# Patient Record
Sex: MALE | Race: BLACK OR AFRICAN AMERICAN | NOT HISPANIC OR LATINO | URBAN - METROPOLITAN AREA
[De-identification: names, ages, dates, MRNs, and addresses within clinical notes are randomized per-mention and may not be internally consistent; named-entity substitution may affect disease eponyms.]

---

## 2018-06-20 ENCOUNTER — INPATIENT (INPATIENT)
Facility: HOSPITAL | Age: 68
LOS: 4 days | Discharge: NURSING, REHAB OR LONG-TERM CARE | End: 2018-06-25
Attending: INTERNAL MEDICINE | Admitting: INTERNAL MEDICINE

## 2018-06-20 VITALS
RESPIRATION RATE: 20 BRPM | SYSTOLIC BLOOD PRESSURE: 149 MMHG | OXYGEN SATURATION: 98 % | HEART RATE: 80 BPM | DIASTOLIC BLOOD PRESSURE: 89 MMHG | TEMPERATURE: 99 F

## 2018-06-20 LAB
ALBUMIN SERPL ELPH-MCNC: 3.9 G/DL — SIGNIFICANT CHANGE UP (ref 3.5–5.2)
ALP SERPL-CCNC: 71 U/L — SIGNIFICANT CHANGE UP (ref 30–115)
ALT FLD-CCNC: 55 U/L — HIGH (ref 0–41)
ANION GAP SERPL CALC-SCNC: 15 MMOL/L — HIGH (ref 7–14)
APAP SERPL-MCNC: <5 UG/ML — LOW (ref 10–30)
APPEARANCE UR: CLEAR — SIGNIFICANT CHANGE UP
AST SERPL-CCNC: 40 U/L — SIGNIFICANT CHANGE UP (ref 0–41)
B PERT IGG+IGM PNL SER: (no result)
BASE EXCESS BLDV CALC-SCNC: 0.9 MMOL/L — SIGNIFICANT CHANGE UP (ref -2–2)
BASOPHILS # BLD AUTO: 0.02 K/UL — SIGNIFICANT CHANGE UP (ref 0–0.2)
BASOPHILS NFR BLD AUTO: 0.1 % — SIGNIFICANT CHANGE UP (ref 0–1)
BILIRUB SERPL-MCNC: 2.4 MG/DL — HIGH (ref 0.2–1.2)
BILIRUB UR-MCNC: NEGATIVE — SIGNIFICANT CHANGE UP
BUN SERPL-MCNC: 11 MG/DL — SIGNIFICANT CHANGE UP (ref 10–20)
CALCIUM SERPL-MCNC: 8.6 MG/DL — SIGNIFICANT CHANGE UP (ref 8.5–10.1)
CHLORIDE SERPL-SCNC: 103 MMOL/L — SIGNIFICANT CHANGE UP (ref 98–110)
CK MB CFR SERPL CALC: 3.4 NG/ML — SIGNIFICANT CHANGE UP (ref 0.6–6.3)
CK SERPL-CCNC: 581 U/L — HIGH (ref 0–225)
CO2 SERPL-SCNC: 26 MMOL/L — SIGNIFICANT CHANGE UP (ref 17–32)
COLOR FLD: YELLOW — SIGNIFICANT CHANGE UP
COLOR SPEC: SIGNIFICANT CHANGE UP
CREAT SERPL-MCNC: 0.9 MG/DL — SIGNIFICANT CHANGE UP (ref 0.7–1.5)
DIFF PNL FLD: NEGATIVE — SIGNIFICANT CHANGE UP
EOSINOPHIL # BLD AUTO: 0.08 K/UL — SIGNIFICANT CHANGE UP (ref 0–0.7)
EOSINOPHIL NFR BLD AUTO: 0.6 % — SIGNIFICANT CHANGE UP (ref 0–8)
ERYTHROCYTE [SEDIMENTATION RATE] IN BLOOD: 4 MM/HR — SIGNIFICANT CHANGE UP (ref 0–10)
ETHANOL SERPL-MCNC: <10 MG/DL — HIGH
FLUID INTAKE SUBSTANCE CLASS: SIGNIFICANT CHANGE UP
FLUID SEGMENTED GRANULOCYTES: 60 % — SIGNIFICANT CHANGE UP
GLUCOSE SERPL-MCNC: 88 MG/DL — SIGNIFICANT CHANGE UP (ref 70–99)
GLUCOSE UR QL: NEGATIVE MG/DL — SIGNIFICANT CHANGE UP
HCO3 BLDV-SCNC: 27 MMOL/L — SIGNIFICANT CHANGE UP (ref 22–29)
HCT VFR BLD CALC: 44.9 % — SIGNIFICANT CHANGE UP (ref 42–52)
HGB BLD-MCNC: 15.3 G/DL — SIGNIFICANT CHANGE UP (ref 14–18)
IMM GRANULOCYTES NFR BLD AUTO: 0.4 % — HIGH (ref 0.1–0.3)
KETONES UR-MCNC: (no result)
LACTATE BLDV-MCNC: 3.8 MMOL/L — HIGH (ref 0.5–1.6)
LEUKOCYTE ESTERASE UR-ACNC: (no result)
LIDOCAIN IGE QN: 13 U/L — SIGNIFICANT CHANGE UP (ref 7–60)
LYMPHOCYTES # BLD AUTO: 16.8 % — LOW (ref 20.5–51.1)
LYMPHOCYTES # BLD AUTO: 2.37 K/UL — SIGNIFICANT CHANGE UP (ref 1.2–3.4)
LYMPHOCYTES # FLD: 30 — SIGNIFICANT CHANGE UP
MAGNESIUM SERPL-MCNC: 1.5 MG/DL — LOW (ref 1.8–2.4)
MCHC RBC-ENTMCNC: 32 PG — HIGH (ref 27–31)
MCHC RBC-ENTMCNC: 34.1 G/DL — SIGNIFICANT CHANGE UP (ref 32–37)
MCV RBC AUTO: 93.9 FL — SIGNIFICANT CHANGE UP (ref 80–94)
MONOCYTES # BLD AUTO: 1.21 K/UL — HIGH (ref 0.1–0.6)
MONOCYTES NFR BLD AUTO: 8.6 % — SIGNIFICANT CHANGE UP (ref 1.7–9.3)
MONOS+MACROS # FLD: 10 % — SIGNIFICANT CHANGE UP
NEUTROPHILS # BLD AUTO: 10.38 K/UL — HIGH (ref 1.4–6.5)
NEUTROPHILS NFR BLD AUTO: 73.5 % — SIGNIFICANT CHANGE UP (ref 42.2–75.2)
NITRITE UR-MCNC: NEGATIVE — SIGNIFICANT CHANGE UP
NRBC # BLD: 0 /100 WBCS — SIGNIFICANT CHANGE UP (ref 0–0)
NT-PROBNP SERPL-SCNC: 669 PG/ML — HIGH (ref 0–300)
PCO2 BLDV: 47 MMHG — SIGNIFICANT CHANGE UP (ref 41–51)
PH BLDV: 7.37 — SIGNIFICANT CHANGE UP (ref 7.26–7.43)
PH UR: 6 — SIGNIFICANT CHANGE UP (ref 5–8)
PLATELET # BLD AUTO: 135 K/UL — SIGNIFICANT CHANGE UP (ref 130–400)
PO2 BLDV: 32 MMHG — SIGNIFICANT CHANGE UP (ref 20–40)
POTASSIUM SERPL-MCNC: 4.4 MMOL/L — SIGNIFICANT CHANGE UP (ref 3.5–5)
POTASSIUM SERPL-SCNC: 4.4 MMOL/L — SIGNIFICANT CHANGE UP (ref 3.5–5)
PROT SERPL-MCNC: 6.4 G/DL — SIGNIFICANT CHANGE UP (ref 6–8)
PROT UR-MCNC: 30 MG/DL
RBC # BLD: 4.78 M/UL — SIGNIFICANT CHANGE UP (ref 4.7–6.1)
RBC # FLD: 12.6 % — SIGNIFICANT CHANGE UP (ref 11.5–14.5)
RCV VOL RI: 1000 /UL — HIGH (ref 0–5)
SALICYLATES SERPL-MCNC: <0.3 MG/DL — LOW (ref 4–30)
SAO2 % BLDV: 52 % — SIGNIFICANT CHANGE UP
SODIUM SERPL-SCNC: 144 MMOL/L — SIGNIFICANT CHANGE UP (ref 135–146)
SP GR SPEC: 1.02 — SIGNIFICANT CHANGE UP (ref 1.01–1.03)
TOTAL NUCLEATED CELL COUNT, BODY FLUID: 8056 /UL — HIGH (ref 0–5)
TROPONIN T SERPL-MCNC: <0.01 NG/ML — SIGNIFICANT CHANGE UP
TUBE TYPE: SIGNIFICANT CHANGE UP
UROBILINOGEN FLD QL: 1 MG/DL (ref 0.2–0.2)
WBC # BLD: 14.12 K/UL — HIGH (ref 4.8–10.8)
WBC # FLD AUTO: 14.12 K/UL — HIGH (ref 4.8–10.8)

## 2018-06-20 RX ORDER — SODIUM CHLORIDE 9 MG/ML
1000 INJECTION INTRAMUSCULAR; INTRAVENOUS; SUBCUTANEOUS ONCE
Qty: 0 | Refills: 0 | Status: COMPLETED | OUTPATIENT
Start: 2018-06-20 | End: 2018-06-20

## 2018-06-20 RX ORDER — VANCOMYCIN HCL 1 G
1000 VIAL (EA) INTRAVENOUS ONCE
Qty: 0 | Refills: 0 | Status: COMPLETED | OUTPATIENT
Start: 2018-06-20 | End: 2018-06-20

## 2018-06-20 RX ORDER — CEFTRIAXONE 500 MG/1
2 INJECTION, POWDER, FOR SOLUTION INTRAMUSCULAR; INTRAVENOUS ONCE
Qty: 0 | Refills: 0 | Status: COMPLETED | OUTPATIENT
Start: 2018-06-20 | End: 2018-06-20

## 2018-06-20 RX ORDER — MORPHINE SULFATE 50 MG/1
4 CAPSULE, EXTENDED RELEASE ORAL ONCE
Qty: 0 | Refills: 0 | Status: DISCONTINUED | OUTPATIENT
Start: 2018-06-20 | End: 2018-06-20

## 2018-06-20 RX ADMIN — SODIUM CHLORIDE 1000 MILLILITER(S): 9 INJECTION INTRAMUSCULAR; INTRAVENOUS; SUBCUTANEOUS at 21:42

## 2018-06-20 RX ADMIN — SODIUM CHLORIDE 1000 MILLILITER(S): 9 INJECTION INTRAMUSCULAR; INTRAVENOUS; SUBCUTANEOUS at 20:56

## 2018-06-20 RX ADMIN — CEFTRIAXONE 100 GRAM(S): 500 INJECTION, POWDER, FOR SOLUTION INTRAMUSCULAR; INTRAVENOUS at 21:44

## 2018-06-20 RX ADMIN — MORPHINE SULFATE 4 MILLIGRAM(S): 50 CAPSULE, EXTENDED RELEASE ORAL at 21:41

## 2018-06-20 RX ADMIN — MORPHINE SULFATE 4 MILLIGRAM(S): 50 CAPSULE, EXTENDED RELEASE ORAL at 22:00

## 2018-06-20 RX ADMIN — Medication 250 MILLIGRAM(S): at 22:22

## 2018-06-20 NOTE — ED PROVIDER NOTE - ATTENDING CONTRIBUTION TO CARE
67y m hx CVA, gout, HTN, DM, PD, HLD presents w AMS. Hx obtained from patient and  at bedside. State that patient fell last night approx 4 AM, found this afternoon on floor. ? LOC, ? circumstances of fall. Per , patient seems confused, is answering questions much more slowly than usual. Patient c/o L sided hip pain. + knee pain which began yesterday, prior to fall. Patient denies HA, neck pain, CP, SOB, abdominal pain, n/v. No urinary c/o. + few episodes of diarrhea. No fever, chills at home. Exam: WDWN NAD comfortable appearing, conversing. NCAT neck FROM no ttp and no meningismus. Back no midline ttp. Pelvis stable. Skin warm and dry. HEENT WNL,  dry MM. S1S2 RRR, equal pulses b/l, lungs CTAB, no w/r/r, abdomen soft NTND, no r/g, no CVAT, no suprapubic ttp. Mild L sided hip ttp, FROM of hip, knee, ankle. L side knee + overlying erythema, edema, warmth, ttp w palpable effusion. No calf edema. A&O to self and place, not to time/bday/age, CN ii-xii intact, no dysmetria, no pronator drift, 5/5 strength UE and LE b/l, normal sensation, no aphasia, no dysarthria, no droop. A/P - AMS, ? infection, + fall - labs, imaging, reassess. 67y m hx CVA, gout, HTN, DM, PD, HLD presents w AMS. Hx obtained from patient and  at bedside. State that patient fell last night approx 4 AM, found this afternoon on floor. ? LOC, ? circumstances of fall. Per , patient seems confused, is answering questions much more slowly than usual. Patient c/o L sided hip pain. + knee pain which began yesterday, prior to fall. Patient denies HA, neck pain, CP, SOB, abdominal pain, n/v. No urinary c/o. + few episodes of diarrhea. No fever, chills at home. Exam: WDWN NAD comfortable appearing, conversing. NCAT neck FROM no ttp and no meningismus. Back no midline ttp. Pelvis stable. Skin warm and dry. HEENT WNL,  dry MM. S1S2 RRR, equal pulses b/l, lungs CTAB, no w/r/r, abdomen soft NTND, no r/g, no CVAT, no suprapubic ttp. Mild L sided hip ttp, FROM of hip, ankle. L side knee + overlying erythema, edema, warmth, ttp w palpable effusion. Held in flexion, pain w extension though able to extent. No calf edema. A&O to self and place, not to time/bday/age, CN ii-xii intact, no dysmetria, no pronator drift, 5/5 strength UE and LE b/l, normal sensation, no aphasia, no dysarthria, no droop. A/P - AMS, ? infection, + fall - labs, imaging, reassess.

## 2018-06-20 NOTE — ED ADULT NURSE NOTE - OBJECTIVE STATEMENT
pt's family member states pt has hx of falls, pt was brought to ed s/p fall to left side that was unwitnessed, unknown if pt hit head or had loc, family member states pt has had a change in mental status and is becoming more frequently confused. pt lives alone. pt c/o left sided hip and knee pain

## 2018-06-20 NOTE — ED PROVIDER NOTE - MEDICAL DECISION MAKING DETAILS
Pt was signed out to me by Dr. Puentes at [id number].  All workup prior to this time was initiated by that provider.  Plan to review labs and imaging and reassess.  Pt with AMS, Found on the floor by his partner.

## 2018-06-20 NOTE — ED PROVIDER NOTE - PROGRESS NOTE DETAILS
case dw ortho who will assess patient. will tap knee given concern for septic arthiritis. case endorsed to Dr. Nguyen. case signed out to Dr. Boudreaux, pending CT head, urine, arthrocentesis results, reassess, admit to Dr. Nguyen. Thank you. Spoke with MAR - aware of admission. Will order repeat vbg with medicine to follow. Ortho/Medicine to follow tap results.

## 2018-06-20 NOTE — ED ADULT TRIAGE NOTE - CHIEF COMPLAINT QUOTE
patient over last few days have had altered mental status. hx of stroke in past. found on floor today . increasingly altered

## 2018-06-20 NOTE — ED PROVIDER NOTE - NS ED ROS FT
Review of Systems:  	•	CONSTITUTIONAL - no fever, no diaphoresis, no chills  	•	SKIN - no rash  	•	EYES - no eye pain, no blurry vision  	•	ENT - no change in hearing, no sore throat, no ear pain or tinnitus  	•	RESPIRATORY - no shortness of breath, no cough  	•	CARDIAC - no chest pain, no palpitations  	•	GI - no abd pain, no nausea, no vomiting, no diarrhea, no constipation  	•	GENITO-URINARY - no discharge, no dysuria; no hematuria, no increased urinary frequency  	•	MUSCULOSKELETAL - + joint pain   	•	NEUROLOGIC - no headache, no paresthesias, no LOC

## 2018-06-20 NOTE — ED PROVIDER NOTE - OBJECTIVE STATEMENT
67 year old male with pmhx of CVA, parkinsons, HTN, DM, and gout, brought in by friend for altered mental status. As per pt and friend, pt fell at 4 am this morning, unsure of why. Pt does not recall if he lost consciousness or hit his head. as per friend, did not find him on floor until late this afternoon, and was acting confused and not baseline for himself. Pt complaining of left knee pain and swelling for last few days, as well as new left hip pain s/p fall. Pt denies HA, dizziness, neck or back pain, paresthesias, or weakness, N/V/D, abdominal pain, fever, or chills. Pt admits has had gout episodes in knee in past. no injury to knee.

## 2018-06-20 NOTE — ED PROVIDER NOTE - PHYSICAL EXAMINATION
CONST: Well appearing in NAD  EYES: PERRL, EOMI, Sclera and conjunctiva clear.   ENT: No nasal discharge. TM's clear B/L without drainage. Oropharynx normal appearing, no erythema or exudates. Uvula midline.  NECK: Non-tender, normal ROM  CARD: Normal S1 S2; Normal rate and rhythm  RESP: Equal BS B/L, No wheezes, rhonchi or rales. No distress  GI: Soft, non-tender, non-distended.  MS: + swelling and warmth to left knee, pulses 2 +, Normal ROM in all extremities. No midline spinal tenderness.  SKIN: Warm, dry, no acute rashes. Good turgor  NEURO: A&Ox2,  No focal deficits. Strength 5/5 with no sensory deficits.

## 2018-06-21 LAB
ANION GAP SERPL CALC-SCNC: 15 MMOL/L — HIGH (ref 7–14)
BASOPHILS # BLD AUTO: 0.03 K/UL — SIGNIFICANT CHANGE UP (ref 0–0.2)
BASOPHILS NFR BLD AUTO: 0.2 % — SIGNIFICANT CHANGE UP (ref 0–1)
BUN SERPL-MCNC: 10 MG/DL — SIGNIFICANT CHANGE UP (ref 10–20)
CALCIUM SERPL-MCNC: 7.8 MG/DL — LOW (ref 8.5–10.1)
CHLORIDE SERPL-SCNC: 107 MMOL/L — SIGNIFICANT CHANGE UP (ref 98–110)
CK MB CFR SERPL CALC: 2.2 NG/ML — SIGNIFICANT CHANGE UP (ref 0.6–6.3)
CK SERPL-CCNC: 457 U/L — HIGH (ref 0–225)
CO2 SERPL-SCNC: 21 MMOL/L — SIGNIFICANT CHANGE UP (ref 17–32)
COMMENT - FLUIDS: SIGNIFICANT CHANGE UP
CREAT SERPL-MCNC: 0.8 MG/DL — SIGNIFICANT CHANGE UP (ref 0.7–1.5)
EOSINOPHIL # BLD AUTO: 0.18 K/UL — SIGNIFICANT CHANGE UP (ref 0–0.7)
EOSINOPHIL NFR BLD AUTO: 1.5 % — SIGNIFICANT CHANGE UP (ref 0–8)
GLUCOSE SERPL-MCNC: 76 MG/DL — SIGNIFICANT CHANGE UP (ref 70–99)
GRAM STN FLD: SIGNIFICANT CHANGE UP
GRAM STN FLD: SIGNIFICANT CHANGE UP
HCT VFR BLD CALC: 37.3 % — LOW (ref 42–52)
HGB BLD-MCNC: 12.7 G/DL — LOW (ref 14–18)
IMM GRANULOCYTES NFR BLD AUTO: 0.5 % — HIGH (ref 0.1–0.3)
LYMPHOCYTES # BLD AUTO: 17.4 % — LOW (ref 20.5–51.1)
LYMPHOCYTES # BLD AUTO: 2.1 K/UL — SIGNIFICANT CHANGE UP (ref 1.2–3.4)
MAGNESIUM SERPL-MCNC: 1.7 MG/DL — LOW (ref 1.8–2.4)
MCHC RBC-ENTMCNC: 32.3 PG — HIGH (ref 27–31)
MCHC RBC-ENTMCNC: 34 G/DL — SIGNIFICANT CHANGE UP (ref 32–37)
MCV RBC AUTO: 94.9 FL — HIGH (ref 80–94)
MONOCYTES # BLD AUTO: 0.98 K/UL — HIGH (ref 0.1–0.6)
MONOCYTES NFR BLD AUTO: 8.1 % — SIGNIFICANT CHANGE UP (ref 1.7–9.3)
NEUTROPHILS # BLD AUTO: 8.72 K/UL — HIGH (ref 1.4–6.5)
NEUTROPHILS NFR BLD AUTO: 72.3 % — SIGNIFICANT CHANGE UP (ref 42.2–75.2)
PLATELET # BLD AUTO: 114 K/UL — LOW (ref 130–400)
POTASSIUM SERPL-MCNC: 3.5 MMOL/L — SIGNIFICANT CHANGE UP (ref 3.5–5)
POTASSIUM SERPL-SCNC: 3.5 MMOL/L — SIGNIFICANT CHANGE UP (ref 3.5–5)
RBC # BLD: 3.93 M/UL — LOW (ref 4.7–6.1)
RBC # FLD: 12.7 % — SIGNIFICANT CHANGE UP (ref 11.5–14.5)
SODIUM SERPL-SCNC: 143 MMOL/L — SIGNIFICANT CHANGE UP (ref 135–146)
SPECIMEN SOURCE: SIGNIFICANT CHANGE UP
TROPONIN T SERPL-MCNC: <0.01 NG/ML — SIGNIFICANT CHANGE UP
URATE SERPL-MCNC: 5.9 MG/DL — SIGNIFICANT CHANGE UP (ref 3.4–8.8)
WBC # BLD: 12.07 K/UL — HIGH (ref 4.8–10.8)
WBC # FLD AUTO: 12.07 K/UL — HIGH (ref 4.8–10.8)
WBC UR QL: (no result) /HPF

## 2018-06-21 RX ORDER — MAGNESIUM SULFATE 500 MG/ML
2 VIAL (ML) INJECTION ONCE
Qty: 0 | Refills: 0 | Status: COMPLETED | OUTPATIENT
Start: 2018-06-21 | End: 2018-06-21

## 2018-06-21 RX ORDER — VANCOMYCIN HCL 1 G
1000 VIAL (EA) INTRAVENOUS EVERY 12 HOURS
Qty: 0 | Refills: 0 | Status: DISCONTINUED | OUTPATIENT
Start: 2018-06-21 | End: 2018-06-21

## 2018-06-21 RX ORDER — ENOXAPARIN SODIUM 100 MG/ML
40 INJECTION SUBCUTANEOUS DAILY
Qty: 0 | Refills: 0 | Status: DISCONTINUED | OUTPATIENT
Start: 2018-06-21 | End: 2018-06-25

## 2018-06-21 RX ORDER — OXYCODONE AND ACETAMINOPHEN 5; 325 MG/1; MG/1
1 TABLET ORAL EVERY 4 HOURS
Qty: 0 | Refills: 0 | Status: DISCONTINUED | OUTPATIENT
Start: 2018-06-21 | End: 2018-06-25

## 2018-06-21 RX ORDER — CEFTRIAXONE 500 MG/1
2 INJECTION, POWDER, FOR SOLUTION INTRAMUSCULAR; INTRAVENOUS EVERY 24 HOURS
Qty: 0 | Refills: 0 | Status: DISCONTINUED | OUTPATIENT
Start: 2018-06-21 | End: 2018-06-22

## 2018-06-21 RX ORDER — ESCITALOPRAM OXALATE 10 MG/1
10 TABLET, FILM COATED ORAL DAILY
Qty: 0 | Refills: 0 | Status: DISCONTINUED | OUTPATIENT
Start: 2018-06-21 | End: 2018-06-25

## 2018-06-21 RX ORDER — CARVEDILOL PHOSPHATE 80 MG/1
12.5 CAPSULE, EXTENDED RELEASE ORAL EVERY 12 HOURS
Qty: 0 | Refills: 0 | Status: DISCONTINUED | OUTPATIENT
Start: 2018-06-21 | End: 2018-06-25

## 2018-06-21 RX ORDER — CHOLECALCIFEROL (VITAMIN D3) 125 MCG
1000 CAPSULE ORAL DAILY
Qty: 0 | Refills: 0 | Status: DISCONTINUED | OUTPATIENT
Start: 2018-06-21 | End: 2018-06-25

## 2018-06-21 RX ORDER — ATORVASTATIN CALCIUM 80 MG/1
40 TABLET, FILM COATED ORAL DAILY
Qty: 0 | Refills: 0 | Status: DISCONTINUED | OUTPATIENT
Start: 2018-06-21 | End: 2018-06-25

## 2018-06-21 RX ORDER — LISINOPRIL 2.5 MG/1
20 TABLET ORAL DAILY
Qty: 0 | Refills: 0 | Status: DISCONTINUED | OUTPATIENT
Start: 2018-06-21 | End: 2018-06-25

## 2018-06-21 RX ORDER — PANTOPRAZOLE SODIUM 20 MG/1
40 TABLET, DELAYED RELEASE ORAL
Qty: 0 | Refills: 0 | Status: DISCONTINUED | OUTPATIENT
Start: 2018-06-21 | End: 2018-06-25

## 2018-06-21 RX ORDER — CARBIDOPA AND LEVODOPA 25; 100 MG/1; MG/1
1 TABLET ORAL THREE TIMES A DAY
Qty: 0 | Refills: 0 | Status: DISCONTINUED | OUTPATIENT
Start: 2018-06-21 | End: 2018-06-22

## 2018-06-21 RX ADMIN — OXYCODONE AND ACETAMINOPHEN 1 TABLET(S): 5; 325 TABLET ORAL at 13:30

## 2018-06-21 RX ADMIN — ENOXAPARIN SODIUM 40 MILLIGRAM(S): 100 INJECTION SUBCUTANEOUS at 12:56

## 2018-06-21 RX ADMIN — CARBIDOPA AND LEVODOPA 1 TABLET(S): 25; 100 TABLET ORAL at 05:43

## 2018-06-21 RX ADMIN — ESCITALOPRAM OXALATE 10 MILLIGRAM(S): 10 TABLET, FILM COATED ORAL at 12:56

## 2018-06-21 RX ADMIN — Medication 40 MILLIGRAM(S): at 12:42

## 2018-06-21 RX ADMIN — CARVEDILOL PHOSPHATE 12.5 MILLIGRAM(S): 80 CAPSULE, EXTENDED RELEASE ORAL at 05:43

## 2018-06-21 RX ADMIN — Medication 500 MILLIGRAM(S): at 05:43

## 2018-06-21 RX ADMIN — OXYCODONE AND ACETAMINOPHEN 1 TABLET(S): 5; 325 TABLET ORAL at 17:27

## 2018-06-21 RX ADMIN — Medication 500 MILLIGRAM(S): at 09:05

## 2018-06-21 RX ADMIN — CARBIDOPA AND LEVODOPA 1 TABLET(S): 25; 100 TABLET ORAL at 13:28

## 2018-06-21 RX ADMIN — LISINOPRIL 20 MILLIGRAM(S): 2.5 TABLET ORAL at 05:43

## 2018-06-21 RX ADMIN — CEFTRIAXONE 100 GRAM(S): 500 INJECTION, POWDER, FOR SOLUTION INTRAMUSCULAR; INTRAVENOUS at 05:43

## 2018-06-21 RX ADMIN — Medication 50 GRAM(S): at 01:53

## 2018-06-21 RX ADMIN — Medication 12.5 GRAM(S): at 11:21

## 2018-06-21 RX ADMIN — Medication 1000 UNIT(S): at 12:56

## 2018-06-21 RX ADMIN — OXYCODONE AND ACETAMINOPHEN 1 TABLET(S): 5; 325 TABLET ORAL at 12:42

## 2018-06-21 RX ADMIN — CARVEDILOL PHOSPHATE 12.5 MILLIGRAM(S): 80 CAPSULE, EXTENDED RELEASE ORAL at 17:28

## 2018-06-21 NOTE — CONSULT NOTE ADULT - SUBJECTIVE AND OBJECTIVE BOX
HPI:  68 y/o M PMH of HTN, Parkinson, DM, CVA, gout is here s/p fall. History was obtained from relative/caregiver- Ms Caroline Alegre.   She states she saw him yesterday, when he complained of pain in his Lt knee, but his knee was not swollen. Later this morning, pt was not answering phone calls, so a friend went to check on him and found him on the floor and he was naked. Unknown if he lost consciousness/ hit head. He complained of pain in the Lt knee and it was swollen and he was brought to the ED. No c/o recent fever, trauma to the knee. No CP, SOB, Dizziness. Also pt at baseline has cognitive impairment, but he can hold a conversion and knows where he is, but at this time he only knows his name and he is very confused, which is not his baseline.  Pt is from [place] and he was moved to [place] due to history of recurrent falls. Unsure why he is falling, but caregiver states he has problem with balance and sometimes refuses to use a walker. Also he is being evaluated by a cardiologist as outpt for recurrent dizziness and falls. His last gout attack was a year ago. (2018 01:17)      PAST MEDICAL & SURGICAL HISTORY:  Gout  Diabetes mellitus  HTN (hypertension)  CVA (cerebral vascular accident)  Parkinson disease      Hospital Course:  He has been getting gout attacks twice a day. He tends to take steps per his relative/caretaker. he has been diagnosed with Parkinson's recently.    TODAY'S SUBJECTIVE & REVIEW OF SYMPTOMS:     Constitutional WNL   Cardio WNL   Resp WNL   GI WNL  Heme WNL  Endo WNL  Skin WNL  MSK WNL  Neuro WNL  Cognitive WNL  Psych WNL      MEDICATIONS  (STANDING):  atorvastatin Oral Tab/Cap - Peds 40 milliGRAM(s) Oral daily  carbidopa/levodopa  25/100 1 Tablet(s) Oral three times a day  carvedilol 12.5 milliGRAM(s) Oral every 12 hours  cefTRIAXone   IVPB 2 Gram(s) IV Intermittent every 24 hours  cholecalciferol 1000 Unit(s) Oral daily  enoxaparin Injectable 40 milliGRAM(s) SubCutaneous daily  escitalopram 10 milliGRAM(s) Oral daily  lisinopril 20 milliGRAM(s) Oral daily  pantoprazole    Tablet 40 milliGRAM(s) Oral before breakfast  predniSONE   Tablet 40 milliGRAM(s) Oral daily    MEDICATIONS  (PRN):  oxyCODONE    5 mG/acetaminophen 325 mG 1 Tablet(s) Oral every 4 hours PRN Severe Pain (7 - 10)      FAMILY HISTORY:      Allergies    No Known Allergies    Intolerances        SOCIAL HISTORY:    [  ] Etoh  [  ] Smoking  [  ] Substance abuse     Home Environment:  [ x ] Home Alone  [  ] Lives with Family  [  ] Home Health Aid    Dwelling:  [  ] Apartment  [  ] Private House  [  ] Adult Home  [  ] Skilled Nursing Facility      [  ] Short Term  [  ] Long Term  [  ] Stairs       Elevator [  ]    FUNCTIONAL STATUS PTA: (Check all that apply)  Ambulation: [   ]Independent    [  ] Dependent     [  ] Non-Ambulatory  Assistive Device: [x  ] SA Cane  [  ]  Q Cane  [ x ] Walker-likely would do better with a walker  [  ]  Wheelchair  ADL : [  ] Independent  [  ]  Dependent       Vital Signs Last 24 Hrs  T(C): 36.7 (2018 15:21), Max: 37.7 (2018 20:27)  T(F): 98 (2018 15:21), Max: 99.8 (2018 20:27)  HR: 64 (2018 15:21) (64 - 89)  BP: 141/81 (2018 15:21) (116/69 - 141/81)  BP(mean): --  RR: 19 (2018 15:21) (19 - 20)  SpO2: 96% (2018 15:21) (95% - 96%)      PHYSICAL EXAM: Alert & Oriented X3  GENERAL: NAD, well-groomed, well-developed  HEAD:  Atraumatic, Normocephalic  EYES: EOMI, PERRLA, conjunctiva and sclera clear  NECK: Supple, No JVD, Normal thyroid  CHEST/LUNG: Clear to percussion bilaterally; No rales, rhonchi, wheezing, or rubs  HEART: Regular rate and rhythm; No murmurs, rubs, or gallops  ABDOMEN: Soft, Nontender, Nondistended; Bowel sounds present  EXTREMITIES:  2+ Peripheral Pulses, No clubbing, cyanosis, or edema    NERVOUS SYSTEM:  Cranial Nerves 2-12 intact [  ] Abnormal  [  ]  ROM: WFL all extremities [  ]  Abnormal [ x ]painful to bend the left knee past 55 degrees, warmth left knee  Motor Strength: WFL all extremities  [ x ]  Abnormal [  ] tremor LUE  Sensation: intact to light touch [x  ] Abnormal [  ]  Reflexes: Symmetric [  ]  Abnormal [  ]    FUNCTIONAL STATUS:  Bed Mobility: Independent [  ]  Supervision [  ]  Needs Assistance [  ]  N/A [  ]  Transfers: Independent [  ]  Supervision [  ]  Needs Assistance [  ]  N/A [  ]   Ambulation: Independent [  ]  Supervision [  ]  Needs Assistance [  ]  N/A [  ]  ADL: Independent [  ] Requires Assistance [  ] N/A [  ]      LABS:                        12.7   12.07 )-----------( 114      ( 2018 06:48 )             37.3     06-21    143  |  107  |  10  ----------------------------<  76  3.5   |  21  |  0.8    Ca    7.8<L>      2018 06:48  Mg     1.7     -    TPro  6.4  /  Alb  3.9  /  TBili  2.4<H>  /  DBili  x   /  AST  40  /  ALT  55<H>  /  AlkPhos  71  06-20      Urinalysis Basic - ( 2018 22:15 )    Color: Dark Yellow / Appearance: Clear / S.020 / pH: x  Gluc: x / Ketone: Trace  / Bili: Negative / Urobili: 1.0 mg/dL   Blood: x / Protein: 30 mg/dL / Nitrite: Negative   Leuk Esterase: Small / RBC: x / WBC 6-10 /HPF   Sq Epi: x / Non Sq Epi: x / Bacteria: x        RADIOLOGY & ADDITIONAL STUDIES:    Assesment:

## 2018-06-21 NOTE — CONSULT NOTE ADULT - SUBJECTIVE AND OBJECTIVE BOX
Orthopedics Consult Note:    This is a 67y Male who presents to the ED today with altered mental status; per patient's partner he fell earlier this morning, and had been having left knee pain for past day prior to fall. Per family member patient states he is unable to walk now. Unclear if lost consciousness or hit head. Denies fevers/chills. Reports episodes of gout to knee similar in past.     Past Medical & Surgical History:  CVA, parkinson's HTN, DM, gout    Home Medications: unknown    Allergies:  No Known Allergies    Physical Exam:  Vital Signs:  T(C): 37.7 (06-20-18 @ 20:27), Max: 37.7 (06-20-18 @ 20:27)  HR: 80 (06-20-18 @ 17:45) (80 - 80)  BP: 149/89 (06-20-18 @ 17:45) (149/89 - 149/89)  RR: 20 (06-20-18 @ 17:45) (20 - 20)  SpO2: 98% (06-20-18 @ 17:45) (98% - 98%)    NAD  slow verbal responses but clear  unlabored breathing RA  LLE:  some focal medial knee swelling  minimal warmth/erythema  no obvious swelling laterally  able to actively fully extend knee, flex knee to 100 though reports some discomfort  some discomfort on passive motion  calf soft  NVID    Labs:                        15.3   14.12 )-----------( 135      ( 20 Jun 2018 20:43 )             44.9     06-20    144  |  103  |  11  ----------------------------<  88  4.4   |  26  |  0.9    Ca    8.6      20 Jun 2018 20:43  Mg     1.5     06-20    TPro  6.4  /  Alb  3.9  /  TBili  2.4<H>  /  DBili  x   /  AST  40  /  ALT  55<H>  /  AlkPhos  71  06-20    X-rays of the pelvis and left knee reviewed; no fracture or acute deformity    Orthopedics assisted ER in left knee aspiration; sterile conditions were maintained and appropriate tap was performed with return of approximately 15cc's of straw color fluid. Cell count was 8,056    A/P: 67y Male with left knee swelling and pain likely gout flair  - exam as well as cell count both not consistent with septic knee; as patient has gout history this is the most likely source of pain which was then exacerbated by recent fall  - please f/u synovial fluid gram stain/culture, notify orthopedics immediately if positive  - WBAT LLE  - pain control  - treatment of gout per medical team  - ice, elevation PRN  - physical therapy, gentle ROM encouraged  - care per medical team

## 2018-06-21 NOTE — H&P ADULT - NSHPREVIEWOFSYSTEMS_GEN_ALL_CORE
REVIEW OF SYSTEMS:    CONSTITUTIONAL: No weakness, fevers or chills  RESPIRATORY: No cough, wheezing, hemoptysis; No shortness of breath  CARDIOVASCULAR: No chest pain or palpitations  GASTROINTESTINAL: No abdominal or epigastric pain. No nausea, vomiting, or hematemesis; No diarrhea or constipation. No melena or hematochezia.

## 2018-06-21 NOTE — H&P ADULT - PMH
CVA (cerebral vascular accident)    Diabetes mellitus    Gout    HTN (hypertension)    Parkinson disease

## 2018-06-21 NOTE — H&P ADULT - ASSESSMENT
66 y/o M PMH of HTN, Parkinson, DM, CVA, gout is here s/p fall , c/o Lt knee pain and swelling and also change in mental status    1) Lt knee swelling 2/2 to gout  s/p arthrocentesis- WBC- 8 K, ESR- 4  Unlikely to be septic arthritis  f/u fluid crystals and gram stain  will treat with Naproxen Q12h  Orthopedics on board    2) Recurrent falls- unknown history, unwitnessed falls  h/o vertigo and dizziness  probably from Parkinson disease/orthostatic  24 hr telemetry monitoring  Check orthostatic vitals, 2d echo  PT rehab for recurrent falls  Fall precautions    3) Change in mental status- r/o infectious cause vs worsening of dementia  ?UA- mildly positive.  CXR: no opacity noted.   f/u cultures. will treat with rocephin for now    4) DM- check FS  Start insulin if FS> 180    5) Parkinson disease  c.w Sinemet    6) HTN- c/w lisinopril, coreg    DVT ppx  from home  low carb diet   PT rehab

## 2018-06-21 NOTE — PROVIDER CONTACT NOTE (OTHER) - SITUATION
MD at bedside when fingerstick was checked. fs=65. no insulin ordered. pt given juice and food tray. pt asymptomatic at this time. MD states to monitor. will repeat fingerstick

## 2018-06-21 NOTE — H&P ADULT - NSHPPHYSICALEXAM_GEN_ALL_CORE
T(C): 37.7 (06-20-18 @ 20:27), Max: 37.7 (06-20-18 @ 20:27)  HR: 80 (06-20-18 @ 17:45) (80 - 80)  BP: 149/89 (06-20-18 @ 17:45) (149/89 - 149/89)  RR: 20 (06-20-18 @ 17:45) (20 - 20)  SpO2: 98% (06-20-18 @ 17:45) (98% - 98%)    PHYSICAL EXAM:  GENERAL: Not in distress. Looks comfortable  NERVOUS SYSTEM:  Awake and Alert . Only knows name  CHEST/LUNG: Clear to percussion bilaterally; No wheezing  HEART: Regular rate and rhythm;  ABDOMEN: Soft, Nontender, Nondistended; Bowel sounds present  EXTREMITIES:  Lt: knee swelling+ with warmth/erythema  able to actively fully extend knee with discomfort

## 2018-06-21 NOTE — PROGRESS NOTE ADULT - ASSESSMENT
68 y/o M PMH of HTN, Parkinson, DM, CVA, gout is here s/p fall , c/o Lt knee pain and swelling and also change in mental status    Left knee swelling 2/2 to Gout  - s/p arthrocentesis- WBC- 8 K, with PMH of 60%, RBC 1000  - synovial fluid GS negative.   - unlikely septic arthritis.   - ESR: 4  - f/u synovial fluid Cx  - f/u fluid crystals  - will treat with Naproxen Q12h  Orthopedics: WBAT LLE, pain control, ice, elevation PRN, physical therapy, gentle ROM encouraged.     Recurrent falls- unknown history, unwitnessed falls  - h/o vertigo and dizziness  - 24 hr telemetry monitoring  - fall precaution  - check orthostatic vitals  - will get 2d echo  - PT rehab for recurrent falls    Change in mental status (r/o infection etiology vs. dementia)  - U/A: WBC 6-10, negative nitrite.   - will check B12, folate, TSH, syphilis  - f/u cultures  - c/w ceftriaxone for now    DM  - check FS  Start insulin if FS> 180    Parkinson disease  - c/w Sinemet    HTN  - c/w lisinopril, coreg    DVT ppx: lovenox  GI ppx: PPI  from home  low carb diet   PT rehab 68 y/o M PMH of HTN, Parkinson, DM, CVA, gout is here s/p fall , c/o Lt knee pain and swelling and also change in mental status    Left knee swelling 2/2 to Gout  - s/p arthrocentesis- WBC- 8 K, with PMH of 60%, RBC 1000  - synovial fluid GS negative.   - unlikely septic arthritis.   - ESR: 4  - f/u synovial fluid Cx  - f/u fluid crystals  - f/u X-ray left knee and left hand  - c/w Naproxen Q12h  - start on Prednisone 40mg q24h  - pain control with percocet 1 tab q4h prn  - Rheumatology consulted.   Orthopedics: WBAT LLE, pain control, ice, elevation PRN, physical therapy, gentle ROM encouraged.     Recurrent falls  - unwitnessed falls with post-fall confusion  - 24 hr telemetry monitoring for now  - fall precaution  - check orthostatic vitals  - will get 2d echo  - Neuro (Dr. Artis) consulted as per PMD.   - PT rehab for recurrent falls    Change in mental status (r/o infection etiology vs. dementia)  - U/A: WBC 6-10, negative nitrite.   - will check B12, folate, TSH, syphilis  - f/u cultures  - c/w ceftriaxone for now    DM  - check FS  Start insulin if FS> 180    Parkinson disease  - c/w Sinemet    HTN  - c/w lisinopril, coreg    DVT ppx: lovenox  GI ppx: PPI  from home  low carb diet   PT rehab

## 2018-06-21 NOTE — PROGRESS NOTE ADULT - SUBJECTIVE AND OBJECTIVE BOX
ZAKIA MILLS  67y  Male      Patient is a 67y old  Male who presents with a chief complaint of f/p fall and Lt knee pain and swelling (:17)    HPI:  66 y/o M PMH of HTN, Parkinson, DM, CVA, gout is here s/p fall. History was obtained from relative/caregiver- Ms Caroline Alegre.   She states she saw him yesterday, when he complained of pain in his Lt knee, but his knee was not swollen. Later this morning, pt was not answering phone calls, so a friend went to check on him and found him on the floor and he was naked. Unknown if he lost consciousness/ hit head. He complained of pain in the Lt knee and it was swollen and he was brought to the ED. No c/o recent fever, trauma to the knee. No CP, SOB, Dizziness. Also pt at baseline has cognitive impairment, but he can hold a conversion and knows where he is, but at this time he only knows his name and he is very confused, which is not his baseline.  Pt is from [place] and he was moved to [place] due to history of recurrent falls. Unsure why he is falling, but caregiver states he has problem with balance and sometimes refuses to use a walker. Also he is being evaluated by a cardiologist as outpt for recurrent dizziness and falls. His last gout attack was a year ago. (:17)      PAST MEDICAL & SURGICAL HISTORY:  Gout  Diabetes mellitus  HTN (hypertension)  CVA (cerebral vascular accident)  Parkinson disease    FAMILY HISTORY:    Social:    Home Medications:  aspirin 81 mg oral tablet: 1 tab(s) orally once a day (:17)  Coreg 12.5 mg oral tablet: 1 tab(s) orally 2 times a day (:)  Lexapro 10 mg oral tablet: 1 tab(s) orally once a day (:)  Lipitor 40 mg oral tablet: 1 tab(s) orally once a day ()  lisinopril 20 mg oral tablet: 1 tab(s) orally once a day (:)  metFORMIN 1000 mg oral tablet: 1 tab(s) orally once a day (:)  Sinemet 25 mg-100 mg oral tablet: 1 tab(s) orally 3 times a day (2018 01:17)  Vitamin D3 1000 intl units oral tablet: 1 tab(s) orally once a day (2018 01:17)      MEDICATIONS  (STANDING):  atorvastatin Oral Tab/Cap - Peds 40 milliGRAM(s) Oral daily  carbidopa/levodopa  25/100 1 Tablet(s) Oral three times a day  carvedilol 12.5 milliGRAM(s) Oral every 12 hours  cefTRIAXone   IVPB 2 Gram(s) IV Intermittent every 24 hours  cholecalciferol 1000 Unit(s) Oral daily  enoxaparin Injectable 40 milliGRAM(s) SubCutaneous daily  escitalopram 10 milliGRAM(s) Oral daily  lisinopril 20 milliGRAM(s) Oral daily  pantoprazole    Tablet 40 milliGRAM(s) Oral before breakfast  predniSONE   Tablet 40 milliGRAM(s) Oral daily    MEDICATIONS  (PRN):  oxyCODONE    5 mG/acetaminophen 325 mG 1 Tablet(s) Oral every 4 hours PRN Severe Pain (7 - 10)      No Known Allergies      INTERVAL HPI/OVERNIGHT EVENTS:        REVIEW OF SYSTEMS:  CONSTITUTIONAL: No fever, weight loss, or fatigue  EYES: No eye pain, visual disturbances, or discharge  ENMT:  No difficulty hearing, tinnitus, vertigo; No sinus or throat pain  NECK: No pain or stiffness  BREASTS: No pain, masses, or nipple discharge  RESPIRATORY: No cough, wheezing, chills or hemoptysis; No shortness of breath  CARDIOVASCULAR: No chest pain, palpitations, dizziness, or leg swelling  GASTROINTESTINAL: No abdominal or epigastric pain. No nausea, vomiting, or hematemesis; No diarrhea or constipation. No melena or hematochezia.  GENITOURINARY: No dysuria, frequency, hematuria, or incontinence  NEUROLOGICAL: No headaches, memory loss, loss of strength, numbness, or tremors  SKIN: No itching, burning, rashes, or lesions   LYMPH NODES: No enlarged glands  ENDOCRINE: No heat or cold intolerance; No hair loss  MUSCULOSKELETAL: No joint pain or swelling; No muscle, back, or extremity pain  PSYCHIATRIC: No depression, anxiety, mood swings, or difficulty sleeping  HEME/LYMPH: No easy bruising, or bleeding gums  ALLERY AND IMMUNOLOGIC: No hives or eczema    T(C): 36.7 (18 @ 15:21), Max: 37.7 (18 @ 20:27)  HR: 64 (18 @ 15:21) (64 - 89)  BP: 141/81 (18 @ 15:21) (116/69 - 149/89)  RR: 19 (18 @ 15:21) (19 - 20)  SpO2: 96% (18 @ 15:21) (95% - 98%)  Wt(kg): --Vital Signs Last 24 Hrs  T(C): 36.7 (2018 15:21), Max: 37.7 (2018 20:27)  T(F): 98 (2018 15:21), Max: 99.8 (2018 20:27)  HR: 64 (2018 15:21) (64 - 89)  BP: 141/81 (2018 15:21) (116/69 - 149/89)  BP(mean): --  RR: 19 (2018 15:21) (19 - 20)  SpO2: 96% (2018 15:21) (95% - 98%)    PHYSICAL EXAM:  GENERAL: NAD, well-groomed, well-developed  HEAD:  Atraumatic, Normocephalic  EYES: EOMI, PERRLA, conjunctiva and sclera clear  ENMT: No tonsillar erythema, exudates, or enlargement; Moist mucous membranes, Good dentition, No lesions  NECK: Supple, No JVD, Normal thyroid  NERVOUS SYSTEM:  Alert & Oriented X3, Good concentration; Motor Strength 5/5 B/L upper and lower extremities; DTRs 2+ intact and symmetric  CHEST/LUNG: Clear to percussion bilaterally; No rales, rhonchi, wheezing, or rubs  HEART: Regular rate and rhythm; No murmurs, rubs, or gallops  ABDOMEN: Soft, Nontender, Nondistended; Bowel sounds present  EXTREMITIES:  2+ Peripheral Pulses, No clubbing, cyanosis, or edema  LYMPH: No lymphadenopathy noted  SKIN: No rashes or lesions    Consultant(s) Notes Reviewed:  [x ] YES  [ ] NO  Care Discussed with Consultants/Other Providers [ x] YES  [ ] NO    LABS:                        12.7   12.07 )-----------( 114      ( 2018 06:48 )             37.3     -    143  |  107  |  10  ----------------------------<  76  3.5   |  21  |  0.8    Ca    7.8<L>      2018 06:48  Mg     1.7         TPro  6.4  /  Alb  3.9  /  TBili  2.4<H>  /  DBili  x   /  AST  40  /  ALT  55<H>  /  AlkPhos  71        Culture - Body Fluid with Gram Stain (collected 2018 21:39)  Source: .Body Fluid Knee Fluid  Gram Stain (2018 07:35):    polymorphonuclear leukocytes seen    No organisms seen    by cytocentrifuge      Urinalysis Basic - ( 2018 22:15 )    Color: Dark Yellow / Appearance: Clear / S.020 / pH: x  Gluc: x / Ketone: Trace  / Bili: Negative / Urobili: 1.0 mg/dL   Blood: x / Protein: 30 mg/dL / Nitrite: Negative   Leuk Esterase: Small / RBC: x / WBC 6-10 /HPF   Sq Epi: x / Non Sq Epi: x / Bacteria: x      RADIOLOGY & ADDITIONAL TESTS:    Imaging Personally Reviewed:  [ ] YES  [ ] NO    HEALTH ISSUES - PROBLEM Dx:    left knee effusion- septic arthritis vs gout  all x rays- for fracture  ct brain- chronic changes  echo- acceptable  labs- ok  vitals controlled  cont antibiotics, f/u cultures of knee aspirate  rheuma consult  pain management  no bm x3 days- mom in am if still no bm  may need 4A rehab eval  lives home alone, spent night on the floor, was unable to get help-  for home care

## 2018-06-21 NOTE — CONSULT NOTE ADULT - ATTENDING COMMENTS
Pt seen and examined.  Agree with resident exam and plan.    +effusion  10-70 ROM of knee    gram stain negative  cell count below infectious levels  subjectively pain improving    Rec WBAT, OOB, PT, IS, DVT proph, pain control, f/u cultures.  Unlikely infectious etiology.  Ortho will follow cultures.

## 2018-06-21 NOTE — H&P ADULT - HISTORY OF PRESENT ILLNESS
66 y/o M PMH of HTN, Parkinson, DM, CVA, gout is here s/p fall. History was obtained from relative/caregiver- Ms Caroline Alegre.   She states she saw him yesterday, when he complained of pain in his Lt knee, but his knee was not swollen. Later this morning, pt was not answering phone calls, so a friend went to check on him and found him on the floor and he was naked. He complained of pain in the Lt knee and it was swollen and he was brought to the ED. No c/o recent fever, trauma to the knee. No CP, SOB, Dizziness. Also pt at baseline has cognitive impairement, but he can hold a conversion and knows where he is, but at this time he only knows his name and he is very confused, which is not his baseline.  Pt is from [place] and he was moved to [place] due to history of recurrent falls. Unsure why he is falling, but caregiver states he has problem with balance and sometimes refuses to use a walker. Also he is being evaluated by a cardiologist as outpt for recurrent dizziness and falls. 68 y/o M PMH of HTN, Parkinson, DM, CVA, gout is here s/p fall. History was obtained from relative/caregiver- Ms Caroline Alegre.   She states she saw him yesterday, when he complained of pain in his Lt knee, but his knee was not swollen. Later this morning, pt was not answering phone calls, so a friend went to check on him and found him on the floor and he was naked. Unknown if he lost consciousness/ hit head. He complained of pain in the Lt knee and it was swollen and he was brought to the ED. No c/o recent fever, trauma to the knee. No CP, SOB, Dizziness. Also pt at baseline has cognitive impairment, but he can hold a conversion and knows where he is, but at this time he only knows his name and he is very confused, which is not his baseline.  Pt is from [place] and he was moved to [place] due to history of recurrent falls. Unsure why he is falling, but caregiver states he has problem with balance and sometimes refuses to use a walker. Also he is being evaluated by a cardiologist as outpt for recurrent dizziness and falls. His last gout attack was a year ago.

## 2018-06-21 NOTE — CONSULT NOTE ADULT - ASSESSMENT
IMPRESSION: Rehab of Parkinson's disease and left knee gouty arthritis  PRECAUTIONS: [x  ] Cardiac  [  ] Respiratory  [  ] Seizures [  ] Contact Isolation  [  ] Droplet Isolation  [  ] Other    Weight Bearing Status:     RECOMMENDATION:   On prednisone for left knee gout.    Out of Bed to Chair     DVT/Decubiti Prophylaxis    REHAB PLAN:     [ xx  ] Bedside P/T 3-5 times a week   [   ]   Bedside O/T  2-3 times a week             [   ] No Rehab Therapy Indicated                   [   ]  Speech Therapy   Conditioning/ROM                                    ADL  Bed Mobility                                               Conditioning/ROM  Transfers                                                     Bed Mobility  Sitting /Standing Balance                         Transfers                                        Gait Training                                               Sitting/Standing Balance  Stair Training [   ]Applicable                    Home equipment Eval                                                                        Splinting  [   ] Only      GOALS:   ADL   [xx   ]   Independent                    Transfers  [ xx  ] Independent                          Ambulation  [xx   ] Independent     [ xx   ] With device                            [   ]  CG                                                         [   ]  CG                                                                  [   ] CG                            [    ] Min A                                                   [   ] Min A                                                              [   ] Min  A          DISCHARGE PLAN:   [xx   ]  Good candidate for Intensive Rehabilitation/Hospital based-4A [facility]                                             Will tolerate 3hrs Intensive Rehab Daily                                       [    ]  Short Term Rehab in Skilled Nursing Facility                                       [    ]  Home with Outpatient or VN services                                         [    ]  Possible Candidate for Intensive Hospital based Rehab

## 2018-06-22 LAB
ALBUMIN SERPL ELPH-MCNC: 3.1 G/DL — LOW (ref 3.5–5.2)
ALP SERPL-CCNC: 59 U/L — SIGNIFICANT CHANGE UP (ref 30–115)
ALT FLD-CCNC: 18 U/L — SIGNIFICANT CHANGE UP (ref 0–41)
ANION GAP SERPL CALC-SCNC: 11 MMOL/L — SIGNIFICANT CHANGE UP (ref 7–14)
AST SERPL-CCNC: 22 U/L — SIGNIFICANT CHANGE UP (ref 0–41)
BASOPHILS # BLD AUTO: 0.01 K/UL — SIGNIFICANT CHANGE UP (ref 0–0.2)
BASOPHILS NFR BLD AUTO: 0.1 % — SIGNIFICANT CHANGE UP (ref 0–1)
BILIRUB SERPL-MCNC: 0.8 MG/DL — SIGNIFICANT CHANGE UP (ref 0.2–1.2)
BUN SERPL-MCNC: 15 MG/DL — SIGNIFICANT CHANGE UP (ref 10–20)
CALCIUM SERPL-MCNC: 8 MG/DL — LOW (ref 8.5–10.1)
CHLORIDE SERPL-SCNC: 107 MMOL/L — SIGNIFICANT CHANGE UP (ref 98–110)
CO2 SERPL-SCNC: 24 MMOL/L — SIGNIFICANT CHANGE UP (ref 17–32)
CREAT SERPL-MCNC: 0.9 MG/DL — SIGNIFICANT CHANGE UP (ref 0.7–1.5)
CRP SERPL-MCNC: 3.8 MG/DL — HIGH (ref 0–0.4)
CULTURE RESULTS: NO GROWTH — SIGNIFICANT CHANGE UP
EOSINOPHIL # BLD AUTO: 0 K/UL — SIGNIFICANT CHANGE UP (ref 0–0.7)
EOSINOPHIL NFR BLD AUTO: 0 % — SIGNIFICANT CHANGE UP (ref 0–8)
GLUCOSE SERPL-MCNC: 153 MG/DL — HIGH (ref 70–99)
HCT VFR BLD CALC: 37.7 % — LOW (ref 42–52)
HGB BLD-MCNC: 12.8 G/DL — LOW (ref 14–18)
IMM GRANULOCYTES NFR BLD AUTO: 0.4 % — HIGH (ref 0.1–0.3)
LYMPHOCYTES # BLD AUTO: 1.43 K/UL — SIGNIFICANT CHANGE UP (ref 1.2–3.4)
LYMPHOCYTES # BLD AUTO: 11.4 % — LOW (ref 20.5–51.1)
MAGNESIUM SERPL-MCNC: 2.1 MG/DL — SIGNIFICANT CHANGE UP (ref 1.8–2.4)
MCHC RBC-ENTMCNC: 32.1 PG — HIGH (ref 27–31)
MCHC RBC-ENTMCNC: 34 G/DL — SIGNIFICANT CHANGE UP (ref 32–37)
MCV RBC AUTO: 94.5 FL — HIGH (ref 80–94)
MONOCYTES # BLD AUTO: 0.99 K/UL — HIGH (ref 0.1–0.6)
MONOCYTES NFR BLD AUTO: 7.9 % — SIGNIFICANT CHANGE UP (ref 1.7–9.3)
NEUTROPHILS # BLD AUTO: 10.04 K/UL — HIGH (ref 1.4–6.5)
NEUTROPHILS NFR BLD AUTO: 80.2 % — HIGH (ref 42.2–75.2)
NRBC # BLD: 0 /100 WBCS — SIGNIFICANT CHANGE UP (ref 0–0)
PLATELET # BLD AUTO: 129 K/UL — LOW (ref 130–400)
POTASSIUM SERPL-MCNC: 4.5 MMOL/L — SIGNIFICANT CHANGE UP (ref 3.5–5)
POTASSIUM SERPL-SCNC: 4.5 MMOL/L — SIGNIFICANT CHANGE UP (ref 3.5–5)
PROT SERPL-MCNC: 5.2 G/DL — LOW (ref 6–8)
RBC # BLD: 3.99 M/UL — LOW (ref 4.7–6.1)
RBC # FLD: 12.3 % — SIGNIFICANT CHANGE UP (ref 11.5–14.5)
SODIUM SERPL-SCNC: 142 MMOL/L — SIGNIFICANT CHANGE UP (ref 135–146)
SPECIMEN SOURCE: SIGNIFICANT CHANGE UP
URATE SERPL-MCNC: 6 MG/DL — SIGNIFICANT CHANGE UP (ref 3.4–8.8)
WBC # BLD: 12.52 K/UL — HIGH (ref 4.8–10.8)
WBC # FLD AUTO: 12.52 K/UL — HIGH (ref 4.8–10.8)

## 2018-06-22 RX ORDER — ENTACAPONE 200 MG/1
200 TABLET, FILM COATED ORAL EVERY 6 HOURS
Qty: 0 | Refills: 0 | Status: DISCONTINUED | OUTPATIENT
Start: 2018-06-22 | End: 2018-06-25

## 2018-06-22 RX ORDER — CARBIDOPA AND LEVODOPA 25; 100 MG/1; MG/1
1 TABLET ORAL EVERY 6 HOURS
Qty: 0 | Refills: 0 | Status: DISCONTINUED | OUTPATIENT
Start: 2018-06-22 | End: 2018-06-25

## 2018-06-22 RX ORDER — RIVASTIGMINE 4.6 MG/24H
1 PATCH, EXTENDED RELEASE TRANSDERMAL EVERY 24 HOURS
Qty: 0 | Refills: 0 | Status: DISCONTINUED | OUTPATIENT
Start: 2018-06-22 | End: 2018-06-25

## 2018-06-22 RX ORDER — MAGNESIUM HYDROXIDE 400 MG/1
30 TABLET, CHEWABLE ORAL DAILY
Qty: 0 | Refills: 0 | Status: DISCONTINUED | OUTPATIENT
Start: 2018-06-22 | End: 2018-06-25

## 2018-06-22 RX ADMIN — LISINOPRIL 20 MILLIGRAM(S): 2.5 TABLET ORAL at 06:47

## 2018-06-22 RX ADMIN — PANTOPRAZOLE SODIUM 40 MILLIGRAM(S): 20 TABLET, DELAYED RELEASE ORAL at 06:47

## 2018-06-22 RX ADMIN — CARBIDOPA AND LEVODOPA 1 TABLET(S): 25; 100 TABLET ORAL at 11:19

## 2018-06-22 RX ADMIN — MAGNESIUM HYDROXIDE 30 MILLILITER(S): 400 TABLET, CHEWABLE ORAL at 11:19

## 2018-06-22 RX ADMIN — CARVEDILOL PHOSPHATE 12.5 MILLIGRAM(S): 80 CAPSULE, EXTENDED RELEASE ORAL at 06:47

## 2018-06-22 RX ADMIN — ATORVASTATIN CALCIUM 40 MILLIGRAM(S): 80 TABLET, FILM COATED ORAL at 00:21

## 2018-06-22 RX ADMIN — ENTACAPONE 200 MILLIGRAM(S): 200 TABLET, FILM COATED ORAL at 11:19

## 2018-06-22 RX ADMIN — ENTACAPONE 200 MILLIGRAM(S): 200 TABLET, FILM COATED ORAL at 18:01

## 2018-06-22 RX ADMIN — Medication 40 MILLIGRAM(S): at 06:47

## 2018-06-22 RX ADMIN — CEFTRIAXONE 100 GRAM(S): 500 INJECTION, POWDER, FOR SOLUTION INTRAMUSCULAR; INTRAVENOUS at 06:48

## 2018-06-22 RX ADMIN — Medication 1000 UNIT(S): at 11:19

## 2018-06-22 RX ADMIN — CARBIDOPA AND LEVODOPA 1 TABLET(S): 25; 100 TABLET ORAL at 18:01

## 2018-06-22 RX ADMIN — CARVEDILOL PHOSPHATE 12.5 MILLIGRAM(S): 80 CAPSULE, EXTENDED RELEASE ORAL at 18:53

## 2018-06-22 RX ADMIN — CARBIDOPA AND LEVODOPA 1 TABLET(S): 25; 100 TABLET ORAL at 06:47

## 2018-06-22 RX ADMIN — ESCITALOPRAM OXALATE 10 MILLIGRAM(S): 10 TABLET, FILM COATED ORAL at 11:19

## 2018-06-22 RX ADMIN — ENOXAPARIN SODIUM 40 MILLIGRAM(S): 100 INJECTION SUBCUTANEOUS at 11:19

## 2018-06-22 RX ADMIN — CARBIDOPA AND LEVODOPA 1 TABLET(S): 25; 100 TABLET ORAL at 00:20

## 2018-06-22 RX ADMIN — ATORVASTATIN CALCIUM 40 MILLIGRAM(S): 80 TABLET, FILM COATED ORAL at 22:15

## 2018-06-22 NOTE — PROGRESS NOTE ADULT - ASSESSMENT
66 y/o M PMH of HTN, Parkinson, DM, CVA, gout is here s/p fall , c/o Lt knee pain and swelling and also change in mental status    Left knee swelling 2/2 to Gout  - s/p arthrocentesis- WBC- 8 K, with PMH of 60%, RBC 1000  - synovial fluid GS negative.   - unlikely septic arthritis.   - ESR: 4, Uric acid 6.0.   - f/u synovial fluid Cx  - f/u fluid crystals  - X-ray neg fracture.   - c/w Naproxen Q12h  - start on Prednisone 40mg q24h  - pain control with percocet 1 tab q4h prn  - Rheumatology consulted.   Orthopedics: WBAT LLE, pain control, ice, elevation PRN, physical therapy, gentle ROM encouraged.     Recurrent falls  - unwitnessed falls with post-fall confusion  - 24 hr telemetry monitoring for now  - fall precaution  - check orthostatic vitals  - will get 2d echo  - Neuro (Dr. Artis) consulted as per PMD.   - PT rehab for recurrent falls    Change in mental status (r/o infection etiology vs. dementia)  - U/A: WBC 6-10, negative nitrite.   - will check B12, folate, TSH, syphilis  - f/u cultures  - c/w ceftriaxone for now    DM  - check FS  Start insulin if FS> 180    Parkinson disease  - c/w Sinemet    HTN  - c/w lisinopril, coreg    DVT ppx: lovenox  GI ppx: PPI  from home  low carb diet   PT rehab 66 y/o M PMH of HTN, Parkinson, DM, CVA, gout is here s/p fall , c/o Lt knee pain and swelling and also change in mental status    Left knee swelling 2/2 to Gout  - s/p arthrocentesis- WBC- 8 K, with PMH of 60%, RBC 1000  - synovial fluid GS negative.   - unlikely septic arthritis.   - ESR: 4, Uric acid 6.0.   - f/u synovial fluid Cx  - f/u fluid crystals  - X-ray neg acute fracture, finding consistent with deposition disease.   - c/w Prednisone 40mg q24h -> prednisone 30mg --> prednisone 20mg x 2 days --> prednisone 10mg x 2 days  - pain control with percocet 1 tab q4h prn  - Rheumatology: do not start any ULT now, cont prednisone taper, crystals pending (CPPD vs gout), If patient does not have a rheumatologist, can follow with us  as outpatient.   - Orthopedics: WBAT LLE, pain control, ice, elevation PRN, physical therapy, gentle ROM encouraged.     Recurrent falls  - unwitnessed falls with post-fall confusion  - 24 hr telemetry monitoring for now  - fall precaution  - check orthostatic vitals  - Echo: LVEF wnl. G1DD.   - Neuro:  MRI brain w/o contrast to r/o acute cerebral ischemia, increase Sinemet 25/100 po q6hrs, start Comtan 200mg po q6hrs with Sinemet, start Exelon TDS 4.6mg 1 patch q24hrs, if MRI brain neg for acute changes - neurologically stable for d/c and f/u with neurology as OP  - PT /Rehab: 4A     Change in mental status (resolved)  - U/A mildly positive.   - will check B12, folate, TSH, syphilis  - will get EEG, MRI brain w/o contrast  - DC abx.     DM II  - check FS  Start insulin if FS> 180    Parkinson disease  - c/w Sinemet, Comtan  - start on Exelon patch.     HTN  - c/w lisinopril, coreg      DVT ppx: lovenox  GI ppx: PPI  from home  low carb diet   PT rehab

## 2018-06-22 NOTE — PROGRESS NOTE ADULT - SUBJECTIVE AND OBJECTIVE BOX
Patient is a 67y old  Male who presents with a chief complaint of f/p fall and Lt knee pain and swelling (2018 01:17)      Overnight events:   No acute event overnight. Rheumatology recs and Neurology recs appreciated.       PAST MEDICAL & SURGICAL HISTORY:  Gout  Diabetes mellitus  HTN (hypertension)  CVA (cerebral vascular accident)  Parkinson disease      Allergies  No Known Allergies        MEDICATIONS  (STANDING):  atorvastatin Oral Tab/Cap - Peds 40 milliGRAM(s) Oral daily  carbidopa/levodopa  25/100 1 Tablet(s) Oral three times a day  carvedilol 12.5 milliGRAM(s) Oral every 12 hours  cefTRIAXone   IVPB 2 Gram(s) IV Intermittent every 24 hours  cholecalciferol 1000 Unit(s) Oral daily  enoxaparin Injectable 40 milliGRAM(s) SubCutaneous daily  escitalopram 10 milliGRAM(s) Oral daily  lisinopril 20 milliGRAM(s) Oral daily  pantoprazole    Tablet 40 milliGRAM(s) Oral before breakfast  predniSONE   Tablet 40 milliGRAM(s) Oral daily    MEDICATIONS  (PRN):  oxyCODONE    5 mG/acetaminophen 325 mG 1 Tablet(s) Oral every 4 hours PRN Severe Pain (7 - 10)        Vital Signs Last 24 Hrs  T(C): 36.5 (2018 07:25), Max: 36.7 (2018 15:21)  T(F): 97.7 (2018 07:25), Max: 98 (2018 15:21)  HR: 50 (2018 07:25) (50 - 64)  BP: 112/63 (2018 07:25) (112/63 - 141/81)  BP(mean): --  RR: 18 (2018 07:25) (18 - 19)  SpO2: 95% (2018 07:25) (95% - 96%)            Physical Exam:    -     General : Not in acute distress.    -      HEENT: PEERLA    -      Cardiac: S1 & S2. No murmur/gallop/rubs.     -      Pulm: clear to auscultate b/l lung field.     -      GI: positive BS. No tenderness/rigidity/rebound.     -      Musculoskeletal: no pitting edema. Tenderness of left knee joint and left thumb.     -      Neuro: A & O3.         Labs:                                   12.8   12.52 )-----------( 129      ( 2018 06:17 )             37.7             06-22    142  |  107  |  15  ----------------------------<  153<H>  4.5   |  24  |  0.9    Ca    8.0<L>      2018 06:17  Mg     2.1     06-22    TPro  5.2<L>  /  Alb  3.1<L>  /  TBili  0.8  /  DBili  x   /  AST  22  /  ALT  18  /  AlkPhos  59  06-22    LIVER FUNCTIONS - ( 2018 06:17 )  Alb: 3.1 g/dL / Pro: 5.2 g/dL / ALK PHOS: 59 U/L / ALT: 18 U/L / AST: 22 U/L / GGT: x                   CARDIAC MARKERS ( 2018 06:48 )  x     / <0.01 ng/mL / 457 U/L / x     / 2.2 ng/mL  CARDIAC MARKERS ( 2018 20:43 )  x     / <0.01 ng/mL / 581 U/L / x     / 3.4 ng/mL      Urinalysis Basic - ( 2018 22:15 )    Color: Dark Yellow / Appearance: Clear / S.020 / pH: x  Gluc: x / Ketone: Trace  / Bili: Negative / Urobili: 1.0 mg/dL   Blood: x / Protein: 30 mg/dL / Nitrite: Negative   Leuk Esterase: Small / RBC: x / WBC 6-10 /HPF   Sq Epi: x / Non Sq Epi: x / Bacteria: x      Culture - Body Fluid with Gram Stain (collected 2018 21:39)  Source: .Body Fluid Knee Fluid  Gram Stain (2018 07:35):    polymorphonuclear leukocytes seen    No organisms seen    by cytocentrifuge  Preliminary Report (2018 09:51):    No growth    Culture - Blood (collected 2018 18:51)  Source: .Blood Blood  Preliminary Report (2018 03:01):    No growth to date.    Culture - Blood (collected 2018 18:51)  Source: .Blood Blood  Preliminary Report (2018 03:01):    No growth to date.              Imaging:    ECG: Patient is a 67y old  Male who presents with a chief complaint of f/p fall and Lt knee pain and swelling (2018 01:17)      Overnight events:   No acute event overnight. Rheumatology recs and Neurology recs appreciated.       PAST MEDICAL & SURGICAL HISTORY:  Gout  Diabetes mellitus  HTN (hypertension)  CVA (cerebral vascular accident)  Parkinson disease      Allergies  No Known Allergies        MEDICATIONS  (STANDING):  atorvastatin Oral Tab/Cap - Peds 40 milliGRAM(s) Oral daily  carbidopa/levodopa  25/100 1 Tablet(s) Oral every 6 hours  carvedilol 12.5 milliGRAM(s) Oral every 12 hours  cholecalciferol 1000 Unit(s) Oral daily  enoxaparin Injectable 40 milliGRAM(s) SubCutaneous daily  entacapone 200 milliGRAM(s) Oral every 6 hours  escitalopram 10 milliGRAM(s) Oral daily  lisinopril 20 milliGRAM(s) Oral daily  pantoprazole    Tablet 40 milliGRAM(s) Oral before breakfast    MEDICATIONS  (PRN):  magnesium hydroxide Suspension 30 milliLiter(s) Oral daily PRN Constipation  oxyCODONE    5 mG/acetaminophen 325 mG 1 Tablet(s) Oral every 4 hours PRN Severe Pain (7 - 10)      Vital Signs Last 24 Hrs  T(C): 36.5 (2018 07:25), Max: 36.7 (2018 15:21)  T(F): 97.7 (2018 07:25), Max: 98 (2018 15:21)  HR: 50 (2018 07:25) (50 - 64)  BP: 112/63 (2018 07:25) (112/63 - 141/81)  BP(mean): --  RR: 18 (2018 07:25) (18 - 19)  SpO2: 95% (2018 07:25) (95% - 96%)            Physical Exam:    -     General : Not in acute distress.    -      HEENT: PEERLA    -      Cardiac: S1 & S2. No murmur/gallop/rubs.     -      Pulm: clear to auscultate b/l lung field.     -      GI: positive BS. No tenderness/rigidity/rebound.     -      Musculoskeletal: no pitting edema. Tenderness of left knee joint and left thumb.     -      Neuro: A & O3.         Labs:                                   12.8   12.52 )-----------( 129      ( 2018 06:17 )             37.7             06-22    142  |  107  |  15  ----------------------------<  153<H>  4.5   |  24  |  0.9    Ca    8.0<L>      2018 06:17  Mg     2.1     06-22    TPro  5.2<L>  /  Alb  3.1<L>  /  TBili  0.8  /  DBili  x   /  AST  22  /  ALT  18  /  AlkPhos  59  06-22    LIVER FUNCTIONS - ( 2018 06:17 )  Alb: 3.1 g/dL / Pro: 5.2 g/dL / ALK PHOS: 59 U/L / ALT: 18 U/L / AST: 22 U/L / GGT: x                   CARDIAC MARKERS ( 2018 06:48 )  x     / <0.01 ng/mL / 457 U/L / x     / 2.2 ng/mL  CARDIAC MARKERS ( 2018 20:43 )  x     / <0.01 ng/mL / 581 U/L / x     / 3.4 ng/mL      Urinalysis Basic - ( 2018 22:15 )    Color: Dark Yellow / Appearance: Clear / S.020 / pH: x  Gluc: x / Ketone: Trace  / Bili: Negative / Urobili: 1.0 mg/dL   Blood: x / Protein: 30 mg/dL / Nitrite: Negative   Leuk Esterase: Small / RBC: x / WBC 6-10 /HPF   Sq Epi: x / Non Sq Epi: x / Bacteria: x      Culture - Body Fluid with Gram Stain (collected 2018 21:39)  Source: .Body Fluid Knee Fluid  Gram Stain (2018 07:35):    polymorphonuclear leukocytes seen    No organisms seen    by cytocentrifuge  Preliminary Report (2018 09:51):    No growth    Culture - Blood (collected 2018 18:51)  Source: .Blood Blood  Preliminary Report (2018 03:01):    No growth to date.    Culture - Blood (collected 2018 18:51)  Source: .Blood Blood  Preliminary Report (2018 03:01):    No growth to date.              Imaging:    ECG:

## 2018-06-22 NOTE — CONSULT NOTE ADULT - SUBJECTIVE AND OBJECTIVE BOX
68 yo male with hx of long standing gout since 1999. Parkinsons, HTN, CVA, DM admitted after he was found on the floor. As per initial note, history was obtained from caretaker. Pt reports developing sudden onset of left knee pain/ swelling, reports attempting to sit down but may have fallen. Cannot recall. Was taken to ER, ortho performed arthrocentesis with moderate relief.  Feels knee has markedly improved since intervention. Does not know what home meds he is on, states was taking allopurinol but cant remember his dosage.   Typically gets gout flares in his left great toe, this is first time for left knee.  No inciting factors - no trauma noted.    Denies fevers, chills, chest pain, SOB, GI distress tophaceous deposit.  VS  T 97.7  HR 50  RR 18  / 63    PE: General: AAO x 3, NAD  HEENT; moist mucous membranes, NC/AT  Cardio: s1 s2  Resp: CTA b/l  ABd: soft, non tender, non distended  MSK:  left knee with resolving erythema, warmth and effusions, no tophaceous deposits    uric acid 5.9, ESR 4  wbc 12.52, HH 12.8/ 37.7, platelet 129  Na 143. K 3.5, Bun// Ct 10/0.8       joint fluid -- yellow, 8K cell count, RBC 1K, negative gram stain.   A/P  68 yo male with hx of gout here with acute onset of pain, swelling and erythema of L knee.  Unclear if on any ULT at home - do not start any ULT now.    - cont prednisone taper  - crystals pending (CPPD vs gout)   If patient does not have a rheumatologist, can follow with us  as outpatient.

## 2018-06-22 NOTE — CONSULT NOTE ADULT - SUBJECTIVE AND OBJECTIVE BOX
Neurology consult    ZAKIA [id number]    HPI:  68 y/o M PMH of HTN, Parkinson, DM, CVA, gout is here s/p fall. History was obtained from relative/caregiver- Ms Caroline Alegre.   She states she saw him yesterday, when he complained of pain in his Lt knee, but his knee was not swollen. Later this morning, pt was not answering phone calls, so a friend went to check on him and found him on the floor and he was naked. Unknown if he lost consciousness/ hit head. He complained of pain in the Lt knee and it was swollen and he was brought to the ED. No c/o recent fever, trauma to the knee. No CP, SOB, Dizziness. Also pt at baseline has cognitive impairment, but he can hold a conversion and knows where he is, but at this time he only knows his name and he is very confused, which is not his baseline.  Pt is from [place] and he was moved to [place] due to history of recurrent falls. Unsure why he is falling, but caregiver states he has problem with balance and sometimes refuses to use a walker. Also he is being evaluated by a cardiologist as outpt for recurrent dizziness and falls. His last gout attack was a year ago. (2018 01:17)  Has h/o PD  for ~ 1yr, was treated by neurologist in [place], on Sinemet. Pt states that his gait and balance got worse over the few past mo.   Has h/o CVA 2 yrs ago and has residual weakness in the rt side.    MEDICATIONS    atorvastatin Oral Tab/Cap - Peds 40 milliGRAM(s) Oral daily  carbidopa/levodopa  25/100 1 Tablet(s) Oral three times a day  carvedilol 12.5 milliGRAM(s) Oral every 12 hours  cefTRIAXone   IVPB 2 Gram(s) IV Intermittent every 24 hours  cholecalciferol 1000 Unit(s) Oral daily  enoxaparin Injectable 40 milliGRAM(s) SubCutaneous daily  escitalopram 10 milliGRAM(s) Oral daily  lisinopril 20 milliGRAM(s) Oral daily  oxyCODONE    5 mG/acetaminophen 325 mG 1 Tablet(s) Oral every 4 hours PRN  pantoprazole    Tablet 40 milliGRAM(s) Oral before breakfast  predniSONE   Tablet 40 milliGRAM(s) Oral daily    PAST MEDICAL & SURGICAL HISTORY:  Gout  Diabetes mellitus  HTN (hypertension)  CVA (cerebral vascular accident)  Parkinson disease     Family history: No history of dementia, strokes, or seizures   FAMILY HISTORY:  MI, HTN, AD    SOCIAL HISTORY:  Denies smoking, ETOH or drugs    Allergies  cefaclor (Unknown)  penicillin (Unknown)  sulfa drugs (Unknown)    Vital Signs Last 24 Hrs  T(C): 36.5 (2018 07:25), Max: 36.7 (2018 15:21)  T(F): 97.7 (2018 07:25), Max: 98 (2018 15:21)  HR: 50 (2018 07:25) (50 - 64)  BP: 112/63 (2018 07:25) (112/63 - 141/81)  BP(mean): --  RR: 18 (2018 07:25) (18 - 19)  SpO2: 95% (2018 07:25) (95% - 96%)    KNEE PAIN;LEUKOCYTOSIS;FALL  ^AMS  Handoff  MEWS Score  Gout  Diabetes mellitus  HTN (hypertension)  CVA (cerebral vascular accident)  Parkinson disease  Knee pain  AMS  90+  Fall  Leukocytosis      REVIEW OF SYSTEMS:  Constitutional: No fever, chills, fatigue, weakness  Eyes: no eye pain, visual disturbances, or discharge  ENT:  No difficulty hearing, tinnitus, vertigo; No sinus or throat pain  Neck: No pain or stiffness  Respiratory: No cough, dyspnea, wheezing   Cardiovascular: No chest pain, palpitations,   Gastrointestinal: No abdominal or epigastric pain. No nausea, vomiting  No diarrhea or constipation.   Genitourinary: No dysuria, frequency, hematuria or incontinence  Neurological: as per HPI  Psychiatric: as per HPI  Musculoskeletal: lt knee pain  Skin: No itching, burning, rashes or lesions   Lymph Nodes: No enlarged glands  Endocrine: No heat or cold intolerance; No hair loss, No h/o diabetes or thyroid dysfunction  Allergy and Immunologic: No hives or eczema    PHYSICAL EXAMINATION:  General:  Well-developed, well nourished, in no acute distress.  Eyes: Conjunctiva and sclera clear.  Cardiovascular: Regular rate and rhythm; S1 and S2 Normal; No murmurs, gallops or rubs.  Neurologic:  - Mental Status:  Alert, awake, oriented to person, place, and time; Speech - sometime has difficulty to find words, no dysarthria.  - Cranial Nerves II-XII:  PERRLA, EOMI, no nystagmus, face flat rt NLF, hearing intact to finger rub bl, tongue midline  - Motor:  Strength is 4+/5 RUE and RLE, 5/5 LUE and LLE.  There is mild rt arm pronator drift.  Cogwheel rigidity  Rt>Lt extremities. + Bradykinesia, no tremor observed.  - Reflexes:  3+ symmetric throughout  Plantar upgoing bl.  - Sensory:  Intact to light touch, pin prick  sense throughout.  - Coordination:  Finger-nose-finger nl bl.  - Gait:  deferred.    LABS:  CBC Full  -  ( 2018 06:17 )  WBC Count : 12.52 K/uL  Hemoglobin : 12.8 g/dL  Hematocrit : 37.7 %  Platelet Count - Automated : 129 K/uL  Mean Cell Volume : 94.5 fL  Mean Cell Hemoglobin : 32.1 pg  Mean Cell Hemoglobin Concentration : 34.0 g/dL  Auto Neutrophil # : 10.04 K/uL  Auto Lymphocyte # : 1.43 K/uL  Auto Monocyte # : 0.99 K/uL  Auto Eosinophil # : 0.00 K/uL  Auto Basophil # : 0.01 K/uL  Auto Neutrophil % : 80.2 %  Auto Lymphocyte % : 11.4 %  Auto Monocyte % : 7.9 %  Auto Eosinophil % : 0.0 %  Auto Basophil % : 0.1 %    Urinalysis Basic - ( 2018 22:15 )    Color: Dark Yellow / Appearance: Clear / S.020 / pH: x  Gluc: x / Ketone: Trace  / Bili: Negative / Urobili: 1.0 mg/dL   Blood: x / Protein: 30 mg/dL / Nitrite: Negative   Leuk Esterase: Small / RBC: x / WBC 6-10 /HPF   Sq Epi: x / Non Sq Epi: x / Bacteria: x        142  |  107  |  15  ----------------------------<  153<H>  4.5   |  24  |  0.9    Ca    8.0<L>      2018 06:17  Mg     2.1         TPro  5.2<L>  /  Alb  3.1<L>  /  TBili  0.8  /  DBili  x   /  AST  22  /  ALT  18  /  AlkPhos  59  -    Hemoglobin A1C:     LIVER FUNCTIONS - ( 2018 06:17 )  Alb: 3.1 g/dL / Pro: 5.2 g/dL / ALK PHOS: 59 U/L / ALT: 18 U/L / AST: 22 U/L / GGT: x           Vitamin B12     RADIOLOGY  < from: CT Head No Cont (18 @ 22:36) >  No evidence of intracranial hemorrhage, territorial infarct, or mass   effect.    Age-indeterminate lacunar infarct in the right externalcapsule.    Microvascular changes and chronic appearing lacunar infarct imaging of   the left internal capsule.    < end of copied text >

## 2018-06-22 NOTE — PROGRESS NOTE ADULT - SUBJECTIVE AND OBJECTIVE BOX
ZAKIA MILLS  67y  Male  admitted with acute gouty arthritis, unlikely septic  post cva syndrome w rt hemiplegia, parkinsons, poor memory    INTERVAL EVENTS:    T(C): 36.5 (18 @ 07:25), Max: 36.7 (18 @ 15:21)  HR: 50 (18 @ 07:25) (50 - 64)  BP: 112/63 (18 @ 07:25) (112/63 - 141/81)  RR: 18 (18 @ 07:25) (18 - 19)  SpO2: 95% (18 @ 07:25) (95% - 96%)  Wt(kg): --Vital Signs Last 24 Hrs  T(C): 36.5 (2018 07:25), Max: 36.7 (2018 15:21)  T(F): 97.7 (2018 07:25), Max: 98 (2018 15:21)  HR: 50 (2018 07:25) (50 - 64)  BP: 112/63 (2018 07:25) (112/63 - 141/81)  BP(mean): --  RR: 18 (2018 07:25) (18 - 19)  SpO2: 95% (2018 07:25) (95% - 96%)    PHYSICAL EXAM:  GENERAL: resting comfortably, knee edema improving, still in moderate pain  NECK: Supple, No JVD, Normal thyroid  CHEST/LUNG: Clear; No crackles or wheezing  HEART: S1, S2, Regular rate and rhythm;   ABDOMEN: Soft, Nontender, Nondistended; Bowel sounds present  EXTREMITIES: No clubbing, cyanosis, or edema  SKIN: No rashes or lesions    LABS:                          12.8   12.52 )-----------( 129      ( 2018 06:17 )             37.7         142  |  107  |  15  ----------------------------<  153<H>  4.5   |  24  |  0.9    Ca    8.0<L>      2018 06:17  Mg     2.1         TPro  5.2<L>  /  Alb  3.1<L>  /  TBili  0.8  /  DBili  x   /  AST  22  /  ALT  18  /  AlkPhos  59  06-22      Urinalysis Basic - ( 2018 22:15 )    Color: Dark Yellow / Appearance: Clear / S.020 / pH: x  Gluc: x / Ketone: Trace  / Bili: Negative / Urobili: 1.0 mg/dL   Blood: x / Protein: 30 mg/dL / Nitrite: Negative   Leuk Esterase: Small / RBC: x / WBC 6-10 /HPF   Sq Epi: x / Non Sq Epi: x / Bacteria: x        Culture - Body Fluid with Gram Stain (collected 2018 21:39)  Source: .Body Fluid Knee Fluid  Gram Stain (2018 07:35):    polymorphonuclear leukocytes seen    No organisms seen    by cytocentrifuge  Preliminary Report (2018 09:51):    No growth    Culture - Blood (collected 2018 18:51)  Source: .Blood Blood  Preliminary Report (2018 03:01):    No growth to date.    Culture - Blood (collected 2018 18:51)  Source: .Blood Blood  Preliminary Report (2018 03:01):    No growth to date.          atorvastatin Oral Tab/Cap - Peds 40 milliGRAM(s) Oral daily  carbidopa/levodopa  25/100 1 Tablet(s) Oral three times a day  carvedilol 12.5 milliGRAM(s) Oral every 12 hours  cefTRIAXone   IVPB 2 Gram(s) IV Intermittent every 24 hours  cholecalciferol 1000 Unit(s) Oral daily  enoxaparin Injectable 40 milliGRAM(s) SubCutaneous daily  escitalopram 10 milliGRAM(s) Oral daily  lisinopril 20 milliGRAM(s) Oral daily  oxyCODONE    5 mG/acetaminophen 325 mG 1 Tablet(s) Oral every 4 hours PRN  pantoprazole    Tablet 40 milliGRAM(s) Oral before breakfast  predniSONE   Tablet 40 milliGRAM(s) Oral daily      RADIOLOGY & ADDITIONAL TESTS:    case discussed with the resident  Available consult notes reviewed    Assessment and plan:     discussed w neurology, agree w plan  no bm x3 days- mom  complete neuro w/up  pain control  once knee better- 4A for rehab

## 2018-06-22 NOTE — CONSULT NOTE ADULT - ASSESSMENT
A 66 yo man with multiple comorbidities adm for s/p fall.    IMPRESSION:  - most likely fall is 2/2 worsening of PD, r/o new cerebral ischemia  - late effect of CVA with residual rt HP  - probably Dementia - vascular vs 2/2 PD  - h/o MI, HTN, DM    PLAN:  - MRI brain w/o contrast to r/o acute cerebral ischemia  - increase Sinemet 25/100 po q6hrs  - start Comtan 200mg po q6hrs with Sinemet  - PT/rehab eval  - start Exelon TDS 4.6mg 1 patch q24hrs  - if MRI brain neg for acute changes - neurologically stable for d/c and f/u with neurology as OP  - please call us for any questions

## 2018-06-23 ENCOUNTER — TRANSCRIPTION ENCOUNTER (OUTPATIENT)
Age: 68
End: 2018-06-23

## 2018-06-23 LAB
ALBUMIN SERPL ELPH-MCNC: 3.2 G/DL — LOW (ref 3.5–5.2)
ALP SERPL-CCNC: 58 U/L — SIGNIFICANT CHANGE UP (ref 30–115)
ALT FLD-CCNC: 14 U/L — SIGNIFICANT CHANGE UP (ref 0–41)
ANION GAP SERPL CALC-SCNC: 11 MMOL/L — SIGNIFICANT CHANGE UP (ref 7–14)
AST SERPL-CCNC: 41 U/L — SIGNIFICANT CHANGE UP (ref 0–41)
BILIRUB SERPL-MCNC: 0.4 MG/DL — SIGNIFICANT CHANGE UP (ref 0.2–1.2)
BUN SERPL-MCNC: 18 MG/DL — SIGNIFICANT CHANGE UP (ref 10–20)
CALCIUM SERPL-MCNC: 8.1 MG/DL — LOW (ref 8.5–10.1)
CHLORIDE SERPL-SCNC: 107 MMOL/L — SIGNIFICANT CHANGE UP (ref 98–110)
CO2 SERPL-SCNC: 23 MMOL/L — SIGNIFICANT CHANGE UP (ref 17–32)
CREAT SERPL-MCNC: 0.8 MG/DL — SIGNIFICANT CHANGE UP (ref 0.7–1.5)
FOLATE SERPL-MCNC: 5.6 NG/ML — SIGNIFICANT CHANGE UP
GLUCOSE SERPL-MCNC: 148 MG/DL — HIGH (ref 70–99)
HCT VFR BLD CALC: 37.4 % — LOW (ref 42–52)
HGB BLD-MCNC: 12.8 G/DL — LOW (ref 14–18)
MCHC RBC-ENTMCNC: 32.4 PG — HIGH (ref 27–31)
MCHC RBC-ENTMCNC: 34.2 G/DL — SIGNIFICANT CHANGE UP (ref 32–37)
MCV RBC AUTO: 94.7 FL — HIGH (ref 80–94)
NRBC # BLD: 0 /100 WBCS — SIGNIFICANT CHANGE UP (ref 0–0)
PLATELET # BLD AUTO: 138 K/UL — SIGNIFICANT CHANGE UP (ref 130–400)
POTASSIUM SERPL-MCNC: 4.7 MMOL/L — SIGNIFICANT CHANGE UP (ref 3.5–5)
POTASSIUM SERPL-SCNC: 4.7 MMOL/L — SIGNIFICANT CHANGE UP (ref 3.5–5)
PROT SERPL-MCNC: 5.4 G/DL — LOW (ref 6–8)
RBC # BLD: 3.95 M/UL — LOW (ref 4.7–6.1)
RBC # FLD: 12.2 % — SIGNIFICANT CHANGE UP (ref 11.5–14.5)
SODIUM SERPL-SCNC: 141 MMOL/L — SIGNIFICANT CHANGE UP (ref 135–146)
T PALLIDUM AB TITR SER: NEGATIVE — SIGNIFICANT CHANGE UP
TSH SERPL-MCNC: 1.02 UIU/ML — SIGNIFICANT CHANGE UP (ref 0.27–4.2)
VIT B12 SERPL-MCNC: 1608 PG/ML — HIGH (ref 232–1245)
WBC # BLD: 11.83 K/UL — HIGH (ref 4.8–10.8)
WBC # FLD AUTO: 11.83 K/UL — HIGH (ref 4.8–10.8)

## 2018-06-23 RX ORDER — SENNA PLUS 8.6 MG/1
1 TABLET ORAL DAILY
Qty: 0 | Refills: 0 | Status: DISCONTINUED | OUTPATIENT
Start: 2018-06-23 | End: 2018-06-25

## 2018-06-23 RX ORDER — MAGNESIUM HYDROXIDE 400 MG/1
30 TABLET, CHEWABLE ORAL
Qty: 0 | Refills: 0 | COMMUNITY
Start: 2018-06-23

## 2018-06-23 RX ORDER — INSULIN LISPRO 100/ML
4 VIAL (ML) SUBCUTANEOUS
Qty: 0 | Refills: 0 | Status: DISCONTINUED | OUTPATIENT
Start: 2018-06-23 | End: 2018-06-25

## 2018-06-23 RX ORDER — DEXTROSE 50 % IN WATER 50 %
12.5 SYRINGE (ML) INTRAVENOUS ONCE
Qty: 0 | Refills: 0 | Status: DISCONTINUED | OUTPATIENT
Start: 2018-06-23 | End: 2018-06-25

## 2018-06-23 RX ORDER — DOCUSATE SODIUM 100 MG
100 CAPSULE ORAL
Qty: 0 | Refills: 0 | Status: DISCONTINUED | OUTPATIENT
Start: 2018-06-23 | End: 2018-06-25

## 2018-06-23 RX ORDER — ENTACAPONE 200 MG/1
1 TABLET, FILM COATED ORAL
Qty: 0 | Refills: 0 | COMMUNITY
Start: 2018-06-23

## 2018-06-23 RX ORDER — SODIUM CHLORIDE 9 MG/ML
1000 INJECTION, SOLUTION INTRAVENOUS
Qty: 0 | Refills: 0 | Status: DISCONTINUED | OUTPATIENT
Start: 2018-06-23 | End: 2018-06-25

## 2018-06-23 RX ORDER — INSULIN GLARGINE 100 [IU]/ML
12 INJECTION, SOLUTION SUBCUTANEOUS AT BEDTIME
Qty: 0 | Refills: 0 | Status: DISCONTINUED | OUTPATIENT
Start: 2018-06-23 | End: 2018-06-25

## 2018-06-23 RX ORDER — DEXTROSE 50 % IN WATER 50 %
15 SYRINGE (ML) INTRAVENOUS ONCE
Qty: 0 | Refills: 0 | Status: DISCONTINUED | OUTPATIENT
Start: 2018-06-23 | End: 2018-06-25

## 2018-06-23 RX ORDER — GLUCAGON INJECTION, SOLUTION 0.5 MG/.1ML
1 INJECTION, SOLUTION SUBCUTANEOUS ONCE
Qty: 0 | Refills: 0 | Status: DISCONTINUED | OUTPATIENT
Start: 2018-06-23 | End: 2018-06-25

## 2018-06-23 RX ORDER — CARBIDOPA AND LEVODOPA 25; 100 MG/1; MG/1
1 TABLET ORAL
Qty: 0 | Refills: 0 | COMMUNITY
Start: 2018-06-23

## 2018-06-23 RX ORDER — RIVASTIGMINE 4.6 MG/24H
1 PATCH, EXTENDED RELEASE TRANSDERMAL
Qty: 0 | Refills: 0 | COMMUNITY
Start: 2018-06-23

## 2018-06-23 RX ORDER — DEXTROSE 50 % IN WATER 50 %
25 SYRINGE (ML) INTRAVENOUS ONCE
Qty: 0 | Refills: 0 | Status: DISCONTINUED | OUTPATIENT
Start: 2018-06-23 | End: 2018-06-25

## 2018-06-23 RX ORDER — CARBIDOPA AND LEVODOPA 25; 100 MG/1; MG/1
1 TABLET ORAL
Qty: 0 | Refills: 0 | COMMUNITY

## 2018-06-23 RX ORDER — SENNA PLUS 8.6 MG/1
1 TABLET ORAL
Qty: 0 | Refills: 0 | COMMUNITY
Start: 2018-06-23

## 2018-06-23 RX ORDER — ENOXAPARIN SODIUM 100 MG/ML
0 INJECTION SUBCUTANEOUS
Qty: 0 | Refills: 0 | COMMUNITY
Start: 2018-06-23

## 2018-06-23 RX ORDER — DOCUSATE SODIUM 100 MG
1 CAPSULE ORAL
Qty: 0 | Refills: 0 | COMMUNITY
Start: 2018-06-23

## 2018-06-23 RX ADMIN — CARBIDOPA AND LEVODOPA 1 TABLET(S): 25; 100 TABLET ORAL at 23:19

## 2018-06-23 RX ADMIN — OXYCODONE AND ACETAMINOPHEN 1 TABLET(S): 5; 325 TABLET ORAL at 17:47

## 2018-06-23 RX ADMIN — ENTACAPONE 200 MILLIGRAM(S): 200 TABLET, FILM COATED ORAL at 17:04

## 2018-06-23 RX ADMIN — INSULIN GLARGINE 12 UNIT(S): 100 INJECTION, SOLUTION SUBCUTANEOUS at 23:19

## 2018-06-23 RX ADMIN — ESCITALOPRAM OXALATE 10 MILLIGRAM(S): 10 TABLET, FILM COATED ORAL at 11:07

## 2018-06-23 RX ADMIN — LISINOPRIL 20 MILLIGRAM(S): 2.5 TABLET ORAL at 06:11

## 2018-06-23 RX ADMIN — CARBIDOPA AND LEVODOPA 1 TABLET(S): 25; 100 TABLET ORAL at 11:07

## 2018-06-23 RX ADMIN — Medication 1000 UNIT(S): at 11:08

## 2018-06-23 RX ADMIN — PANTOPRAZOLE SODIUM 40 MILLIGRAM(S): 20 TABLET, DELAYED RELEASE ORAL at 06:11

## 2018-06-23 RX ADMIN — ENTACAPONE 200 MILLIGRAM(S): 200 TABLET, FILM COATED ORAL at 11:07

## 2018-06-23 RX ADMIN — CARBIDOPA AND LEVODOPA 1 TABLET(S): 25; 100 TABLET ORAL at 00:12

## 2018-06-23 RX ADMIN — ENTACAPONE 200 MILLIGRAM(S): 200 TABLET, FILM COATED ORAL at 00:12

## 2018-06-23 RX ADMIN — CARBIDOPA AND LEVODOPA 1 TABLET(S): 25; 100 TABLET ORAL at 17:04

## 2018-06-23 RX ADMIN — CARBIDOPA AND LEVODOPA 1 TABLET(S): 25; 100 TABLET ORAL at 06:11

## 2018-06-23 RX ADMIN — Medication 4 UNIT(S): at 17:26

## 2018-06-23 RX ADMIN — ATORVASTATIN CALCIUM 40 MILLIGRAM(S): 80 TABLET, FILM COATED ORAL at 23:21

## 2018-06-23 RX ADMIN — Medication 100 MILLIGRAM(S): at 17:04

## 2018-06-23 RX ADMIN — RIVASTIGMINE 1 PATCH: 4.6 PATCH, EXTENDED RELEASE TRANSDERMAL at 00:12

## 2018-06-23 RX ADMIN — ENTACAPONE 200 MILLIGRAM(S): 200 TABLET, FILM COATED ORAL at 23:19

## 2018-06-23 RX ADMIN — RIVASTIGMINE 1 PATCH: 4.6 PATCH, EXTENDED RELEASE TRANSDERMAL at 23:22

## 2018-06-23 RX ADMIN — Medication 30 MILLIGRAM(S): at 00:12

## 2018-06-23 RX ADMIN — ENTACAPONE 200 MILLIGRAM(S): 200 TABLET, FILM COATED ORAL at 06:11

## 2018-06-23 RX ADMIN — SENNA PLUS 1 TABLET(S): 8.6 TABLET ORAL at 11:09

## 2018-06-23 RX ADMIN — CARVEDILOL PHOSPHATE 12.5 MILLIGRAM(S): 80 CAPSULE, EXTENDED RELEASE ORAL at 06:11

## 2018-06-23 RX ADMIN — ENOXAPARIN SODIUM 40 MILLIGRAM(S): 100 INJECTION SUBCUTANEOUS at 11:08

## 2018-06-23 RX ADMIN — OXYCODONE AND ACETAMINOPHEN 1 TABLET(S): 5; 325 TABLET ORAL at 19:00

## 2018-06-23 RX ADMIN — CARVEDILOL PHOSPHATE 12.5 MILLIGRAM(S): 80 CAPSULE, EXTENDED RELEASE ORAL at 17:04

## 2018-06-23 RX ADMIN — Medication 100 MILLIGRAM(S): at 11:07

## 2018-06-23 RX ADMIN — Medication 4 UNIT(S): at 13:48

## 2018-06-23 NOTE — DISCHARGE NOTE ADULT - CARE PROVIDER_API CALL
Dar Artis), Neurology  [address] [place] [address]  Phone: [phone]  Fax: [phone]    Wendy Mclean), Medicine  [address] [place] [address]  Phone: [phone]  Fax: [phone]    Linsey Carpenter (), Internal Medicine  [address] [place] [address]  Phone: [phone]  Fax: [phone]

## 2018-06-23 NOTE — DISCHARGE NOTE ADULT - MEDICATION SUMMARY - MEDICATIONS TO CHANGE
I will SWITCH the dose or number of times a day I take the medications listed below when I get home from the hospital:    Sinemet 25 mg-100 mg oral tablet  -- 1 tab(s) by mouth 3 times a day

## 2018-06-23 NOTE — DISCHARGE NOTE ADULT - HOSPITAL COURSE
66 yo M PMH of HTN, Parkinson, DM, CVA, gout presents to ED for fall with post fall confusion. Patient was also complaining of left knee pain in ED. Had thoracentesis and septic arthritis was ruled out. X-ray of left knee reveals no fracture but findings consistent with deposition disease. Patient was diagnosed with acute gout and was started on Oral prednisone taper regimen with significant improvement in symptoms. Rheumatology was consulted. Because patient had unwitnessed fall with post fall confusion, neurology was consulted. Routine EEG was negative for seizure. MRI brain was done and negative for acute CVA. Patient was also monitored on Tele for 28 hours without any event. Cardiac enzymes were negative. Patient is medically cleared to be discharged to rehab 4A today.

## 2018-06-23 NOTE — DISCHARGE NOTE ADULT - PLAN OF CARE
complete recovery Please take the prednisone taper dose and follow up with Rheumatology clinic with Dr. Carpenter at [phone]. likely from left knee pain. REEG negative for seizure. MRI negative for acute CVA.   Please follow up with neurology Dr. Artis at [phone] in 1-2 week. Sinemet increased frequency to 4 times a day. Started on Comtan 200mg every 6 hours (give with Sinemet).   Please follow up with Neurology Dr. Artis in 1-2 week.   Started on Exelon TDS 4.6mg 1 patch q24hs. stable MRI brain negative for acute CVA.   Please follow up with neurology with Dr. Artis. optimal BP control Please take the medications as prescribed and follow up with Primary medical doctor. Improved ambulation, no falls.

## 2018-06-23 NOTE — DISCHARGE NOTE ADULT - MEDICATION SUMMARY - MEDICATIONS TO TAKE
I will START or STAY ON the medications listed below when I get home from the hospital:    predniSONE  -- 20 milligram(s) by mouth once a day for 2 days. Then 10 mg by mouth once a day for 2 days.   -- Indication: For Parkinson disease    predniSONE 10 mg oral tablet  -- 1 tab(s) by mouth once a day for 2 days, starting on 6/26  -- Indication: For Parkinson disease    aspirin 81 mg oral tablet  -- 1 tab(s) by mouth once a day  -- Indication: For Cardiac protection    lisinopril 20 mg oral tablet  -- 1 tab(s) by mouth once a day  -- Indication: For HTN (hypertension)    magnesium hydroxide 8% oral suspension  -- 30 milliliter(s) by mouth once a day, As needed, Constipation  -- Indication: For Constipation    Lexapro 10 mg oral tablet  -- 1 tab(s) by mouth once a day  -- Indication: For Depression    metFORMIN 1000 mg oral tablet  -- 1 tab(s) by mouth once a day  -- Indication: For Diabetes mellitus    Lipitor 40 mg oral tablet  -- 1 tab(s) by mouth once a day  -- Indication: For Hypercholesterolemia    carbidopa-levodopa 25 mg-100 mg oral tablet  -- 1 tab(s) by mouth every 6 hours  -- Indication: For Parkinson disease    entacapone 200 mg oral tablet  -- 1 tab(s) by mouth every 6 hours. please give with cinemet together.   -- Indication: For Parkinson disease    Coreg 12.5 mg oral tablet  -- 1 tab(s) by mouth 2 times a day  -- Indication: For HTN (hypertension)    rivastigmine 4.6 mg/24 hr transdermal film, extended release  -- 1 patch by transdermal patch every 24 hours  -- Indication: For Parkinson disease    Vitamin D3 1000 intl units oral tablet  -- 1 tab(s) by mouth once a day  -- Indication: For Nutrition I will START or STAY ON the medications listed below when I get home from the hospital:    predniSONE 10 mg oral tablet  -- 1 tab(s) by mouth once a day for 2 days, starting on 6/26  -- Indication: For Parkinson disease    Percocet 5/325 oral tablet  -- 1 tab(s) by mouth every 4 hours, As Needed for severe pain 7-10.   -- Indication: For Knee pain    aspirin 81 mg oral tablet  -- 1 tab(s) by mouth once a day  -- Indication: For Cardiac protection    lisinopril 20 mg oral tablet  -- 1 tab(s) by mouth once a day  -- Indication: For HTN (hypertension)    magnesium hydroxide 8% oral suspension  -- 30 milliliter(s) by mouth once a day, As needed, Constipation  -- Indication: For Constipation    Lexapro 10 mg oral tablet  -- 1 tab(s) by mouth once a day  -- Indication: For Depression    metFORMIN 1000 mg oral tablet  -- 1 tab(s) by mouth once a day  -- Indication: For Diabetes mellitus    Lipitor 40 mg oral tablet  -- 1 tab(s) by mouth once a day  -- Indication: For Hypercholesterolemia    carbidopa-levodopa 25 mg-100 mg oral tablet  -- 1 tab(s) by mouth every 6 hours  -- Indication: For Parkinson disease    entacapone 200 mg oral tablet  -- 1 tab(s) by mouth every 6 hours. please give with cinemet together.   -- Indication: For Parkinson disease    Coreg 12.5 mg oral tablet  -- 1 tab(s) by mouth 2 times a day  -- Indication: For HTN (hypertension)    rivastigmine 4.6 mg/24 hr transdermal film, extended release  -- 1 patch by transdermal patch every 24 hours  -- Indication: For Parkinson disease    Vitamin D3 1000 intl units oral tablet  -- 1 tab(s) by mouth once a day  -- Indication: For Nutrition

## 2018-06-23 NOTE — DISCHARGE NOTE ADULT - CARE PLAN
Principal Discharge DX:	Knee pain  Goal:	complete recovery  Assessment and plan of treatment:	Please take the prednisone taper dose and follow up with Rheumatology clinic with Dr. Carpenter at [phone].  Secondary Diagnosis:	Fall  Goal:	likely from left knee pain.  Assessment and plan of treatment:	REEG negative for seizure. MRI negative for acute CVA.   Please follow up with neurology Dr. Artis at [phone] in 1-2 week.  Secondary Diagnosis:	Parkinson disease  Assessment and plan of treatment:	Sinemet increased frequency to 4 times a day. Started on Comtan 200mg every 6 hours (give with Sinemet).   Please follow up with Neurology Dr. Artis in 1-2 week.   Started on Exelon TDS 4.6mg 1 patch q24hs.  Secondary Diagnosis:	CVA (cerebral vascular accident)  Goal:	stable  Assessment and plan of treatment:	MRI brain negative for acute CVA.   Please follow up with neurology with Dr. Artis.  Secondary Diagnosis:	HTN (hypertension)  Goal:	optimal BP control  Assessment and plan of treatment:	Please take the medications as prescribed and follow up with Primary medical doctor.  Secondary Diagnosis:	Diabetes mellitus Principal Discharge DX:	Knee pain  Goal:	Improved ambulation, no falls.  Assessment and plan of treatment:	Please take the prednisone taper dose and follow up with Rheumatology clinic with Dr. Carpenter at [phone].  Secondary Diagnosis:	Fall  Goal:	likely from left knee pain.  Assessment and plan of treatment:	REEG negative for seizure. MRI negative for acute CVA.   Please follow up with neurology Dr. Artis at [phone] in 1-2 week.  Secondary Diagnosis:	Parkinson disease  Assessment and plan of treatment:	Sinemet increased frequency to 4 times a day. Started on Comtan 200mg every 6 hours (give with Sinemet).   Please follow up with Neurology Dr. Artis in 1-2 week.   Started on Exelon TDS 4.6mg 1 patch q24hs.  Secondary Diagnosis:	CVA (cerebral vascular accident)  Goal:	stable  Assessment and plan of treatment:	MRI brain negative for acute CVA.   Please follow up with neurology with Dr. Artis.  Secondary Diagnosis:	HTN (hypertension)  Goal:	optimal BP control  Assessment and plan of treatment:	Please take the medications as prescribed and follow up with Primary medical doctor.  Secondary Diagnosis:	Diabetes mellitus

## 2018-06-23 NOTE — PROGRESS NOTE ADULT - SUBJECTIVE AND OBJECTIVE BOX
ZAKIA MILLS  68y  Male  admitted w acute gouty arthritis  lives home alone and was found naked after fall 24 after!   today is his birthday and he pushed himself to walk and take a shower, not admitting to pain  now in severe pain  had no bm for 3 days- give mom  needs to be reevaluated by PT for 4A- needs rehab for parkinsons gait and balance control  not a safe discharge at this time    INTERVAL EVENTS:    T(C): 36.1 (06-23-18 @ 13:56), Max: 36.5 (06-22-18 @ 22:02)  HR: 60 (06-23-18 @ 13:56) (53 - 60)  BP: 146/74 (06-23-18 @ 13:56) (128/69 - 146/74)  RR: 18 (06-23-18 @ 13:56) (18 - 18)  SpO2: 95% (06-23-18 @ 08:15) (95% - 95%)  Wt(kg): --Vital Signs Last 24 Hrs  T(C): 36.1 (23 Jun 2018 13:56), Max: 36.5 (22 Jun 2018 22:02)  T(F): 97 (23 Jun 2018 13:56), Max: 97.7 (22 Jun 2018 22:02)  HR: 60 (23 Jun 2018 13:56) (53 - 60)  BP: 146/74 (23 Jun 2018 13:56) (128/69 - 146/74)  BP(mean): --  RR: 18 (23 Jun 2018 13:56) (18 - 18)  SpO2: 95% (23 Jun 2018 08:15) (95% - 95%)    PHYSICAL EXAM:  GENERAL: resting comfortably, but c/o pain  NECK: Supple, No JVD, Normal thyroid  CHEST/LUNG: Clear; No crackles or wheezing  HEART: S1, S2, Regular rate and rhythm;   ABDOMEN: Soft, Nontender, Nondistended; Bowel sounds present  EXTREMITIES: No clubbing, cyanosis, or edema  SKIN: No rashes or lesions    LABS:                          12.8   11.83 )-----------( 138      ( 23 Jun 2018 05:29 )             37.4     06-23    141  |  107  |  18  ----------------------------<  148<H>  4.7   |  23  |  0.8    Ca    8.1<L>      23 Jun 2018 05:29  Mg     2.1     06-22    TPro  5.4<L>  /  Alb  3.2<L>  /  TBili  0.4  /  DBili  x   /  AST  41  /  ALT  14  /  AlkPhos  58  06-23          Culture - Urine (collected 20 Jun 2018 22:15)  Source: .Urine Clean Catch (Midstream)  Final Report (22 Jun 2018 23:04):    No growth    Culture - Body Fluid with Gram Stain (collected 20 Jun 2018 21:39)  Source: .Body Fluid Knee Fluid  Gram Stain (21 Jun 2018 07:35):    polymorphonuclear leukocytes seen    No organisms seen    by cytocentrifuge  Preliminary Report (22 Jun 2018 09:51):    No growth    Culture - Blood (collected 20 Jun 2018 18:51)  Source: .Blood Blood  Preliminary Report (22 Jun 2018 03:01):    No growth to date.    Culture - Blood (collected 20 Jun 2018 18:51)  Source: .Blood Blood  Preliminary Report (22 Jun 2018 03:01):    No growth to date.          atorvastatin Oral Tab/Cap - Peds 40 milliGRAM(s) Oral daily  carbidopa/levodopa  25/100 1 Tablet(s) Oral every 6 hours  carvedilol 12.5 milliGRAM(s) Oral every 12 hours  cholecalciferol 1000 Unit(s) Oral daily  dextrose 40% Gel 15 Gram(s) Oral once PRN  dextrose 5%. 1000 milliLiter(s) IV Continuous <Continuous>  dextrose 50% Injectable 12.5 Gram(s) IV Push once  dextrose 50% Injectable 25 Gram(s) IV Push once  dextrose 50% Injectable 25 Gram(s) IV Push once  docusate sodium 100 milliGRAM(s) Oral two times a day  enoxaparin Injectable 40 milliGRAM(s) SubCutaneous daily  entacapone 200 milliGRAM(s) Oral every 6 hours  escitalopram 10 milliGRAM(s) Oral daily  glucagon  Injectable 1 milliGRAM(s) IntraMuscular once PRN  insulin glargine Injectable (LANTUS) 12 Unit(s) SubCutaneous at bedtime  insulin lispro Injectable (HumaLOG) 4 Unit(s) SubCutaneous before breakfast  insulin lispro Injectable (HumaLOG) 4 Unit(s) SubCutaneous before lunch  insulin lispro Injectable (HumaLOG) 4 Unit(s) SubCutaneous before dinner  lisinopril 20 milliGRAM(s) Oral daily  magnesium hydroxide Suspension 30 milliLiter(s) Oral daily PRN  oxyCODONE    5 mG/acetaminophen 325 mG 1 Tablet(s) Oral every 4 hours PRN  pantoprazole    Tablet 40 milliGRAM(s) Oral before breakfast  predniSONE   Tablet   Oral   rivastigmine patch  4.6 mG/24 Hr(s) 1 Patch Transdermal every 24 hours  senna 1 Tablet(s) Oral daily      RADIOLOGY & ADDITIONAL TESTS:    case discussed with the resident  Available consult notes reviewed    Assessment and plan:     needs 4A rehab reeval- needs intensive inpatient rehab!  not safe for home d/c  cont prednisone  mom  may go to rehab when bed is available

## 2018-06-23 NOTE — DISCHARGE NOTE ADULT - PATIENT PORTAL LINK FT
You can access the [facility] Patient Portal, offered by [facility], by registering with the following website: [email or web address]

## 2018-06-23 NOTE — PROGRESS NOTE ADULT - ASSESSMENT
SUBJECTIVE:    Patient is a 68y old Male who presents with a chief complaint of f/p fall and Lt knee pain and swelling (21 Jun 2018 01:17)  Currently admitted to medicine with the primary diagnosis of Knee pain   Today is hospital day 3d. Patient notes pain is greatly improved compared to yesterday. He notes mild pain with movement of L knee in the infrapatellar and tibial region. Patient has no other complaints other than mild constipation. Patient is eager for discharge.   PAST MEDICAL & SURGICAL HISTORY  Gout  Diabetes mellitus  HTN (hypertension)  CVA (cerebral vascular accident)  Parkinson disease    SOCIAL HISTORY:  Negative for smoking/alcohol/drug use.     ALLERGIES:  cefaclor (Unknown)  penicillin (Unknown)  sulfa drugs (Unknown)    MEDICATIONS:  STANDING MEDICATIONS  atorvastatin Oral Tab/Cap - Peds 40 milliGRAM(s) Oral daily  carbidopa/levodopa  25/100 1 Tablet(s) Oral every 6 hours  carvedilol 12.5 milliGRAM(s) Oral every 12 hours  cholecalciferol 1000 Unit(s) Oral daily  enoxaparin Injectable 40 milliGRAM(s) SubCutaneous daily  entacapone 200 milliGRAM(s) Oral every 6 hours  escitalopram 10 milliGRAM(s) Oral daily  lisinopril 20 milliGRAM(s) Oral daily  pantoprazole    Tablet 40 milliGRAM(s) Oral before breakfast  predniSONE   Tablet   Oral   rivastigmine patch  4.6 mG/24 Hr(s) 1 Patch Transdermal every 24 hours    PRN MEDICATIONS  magnesium hydroxide Suspension 30 milliLiter(s) Oral daily PRN  oxyCODONE    5 mG/acetaminophen 325 mG 1 Tablet(s) Oral every 4 hours PRN    VITALS:   T(F): 96.5  HR: 53  BP: 135/74  RR: 18  SpO2: 95%  FS 6/22 22:23 233  FS 6/23 8:13 173  LABS:                        12.8   11.83 )-----------( 138      ( 23 Jun 2018 05:29 )             37.4     06-23    141  |  107  |  18  ----------------------------<  148<H>  4.7   |  23  |  0.8    Ca    8.1<L>      23 Jun 2018 05:29  Mg     2.1     06-22    TPro  5.4<L>  /  Alb  3.2<L>  /  TBili  0.4  /  DBili  x   /  AST  41  /  ALT  14  /  AlkPhos  58  06-23  Treponema Pallidum Antibody Interpretation: Negative: A negative treponemal  antibody screen indicates no serologic evidence of  syphilis (early infection cannot be excluded) and no additional testing  is required.   A positive screen is followed by testing for RPR.  Positive RPR indicates serologic evidence of new, inadequately treated,  or persistent syphilis infection and titer will be performed.  A negative  RPR following a positive treponemal screen may indicate adequately  treated or resolved past syphilis infection.  A negative RPR will trigger  a specific treponemal  antibody test, TPPA (treponema pallidum passive  particle agglutination).   A positive TPPA confirms previous or current  syphilis infection.   A negative TPPA suggests that the original  treponemal antibody screen was a false positive, but clinical assessment  of the patient is recommended. (06.22.18 @ 06:17)    Mean Cell Volume: 93.9 fL (06.20.18 @ 20:43)        Culture - Urine (collected 20 Jun 2018 22:15)  Source: .Urine Clean Catch (Midstream)  Final Report (22 Jun 2018 23:04):    No growth    Culture - Body Fluid with Gram Stain (collected 20 Jun 2018 21:39)  Source: .Body Fluid Knee Fluid  Gram Stain (21 Jun 2018 07:35):    polymorphonuclear leukocytes seen    No organisms seen    by cytocentrifuge  Preliminary Report (22 Jun 2018 09:51):    No growth    Culture - Blood (collected 20 Jun 2018 18:51)  Source: .Blood Blood  Preliminary Report (22 Jun 2018 03:01):    No growth to date.    Culture - Blood (collected 20 Jun 2018 18:51)  Source: .Blood Blood  Preliminary Report (22 Jun 2018 03:01):    No growth to date.    RADIOLOGY:  mri pending.     PHYSICAL EXAM:  GEN: No acute distress, awake sitting in bed.   LUNGS: Clear to auscultation bilaterally   HEART: Regular,S1 systolic murmur.   ABD: Soft, non-tender, non-distended.  EXT: NC/NC/NE/2+PP/SALMERON/Skin Intact. L knee non-erythematous, non-tender. Full range of motion.   NEURO: AAOX3, patient speaks in coherent sentences.     Assessment and plan: 68 y/o M PMH of HTN, Parkinson, DM, CVA, gout is here s/p fall , c/o Lt knee pain and swelling and also change in mental status    Left knee swelling 2/2 to Gout  - s/p arthrocentesis- WBC- 8 K, with PMH of 60%, RBC 1000  - synovial fluid GS negative.   - unlikely septic arthritis.   - ESR: 4, Uric acid 6.0.   - f/u synovial fluid Cx, no growth to date.  - f/u fluid crystals  - X-ray neg acute fracture, finding consistent with deposition disease.   - c/w Prednisone 40mg q24h -> prednisone 30mg --> prednisone 20mg x 2 days --> prednisone 10mg x 2 days  - pain control with percocet 1 tab q4h prn  - Rheumatology: do not start any ULT now, cont prednisone taper, crystals pending (CPPD vs gout), If patient does not have a rheumatologist, can follow with   as outpatient.   - Orthopedics: WBAT LLE, pain control, ice, elevation PRN, physical therapy, gentle ROM encouraged.     Recurrent falls-likely 2/2 worsening PD.   - unwitnessed falls with post-fall confusion  - 24 hr telemetry monitoring for now  - fall precaution  - check orthostatic vitals  - Echo: LVEF wnl. G1DD.   - Neuro:  MRI brain w/o contrast to r/o acute cerebral ischemia, increase Sinemet 25/100 po q6hrs, start Comtan 200mg po q6hrs with Sinemet, start Exelon TDS 4.6mg 1 patch q24hrs  - if MRI brain neg for acute changes - neurologically stable for d/c and f/u with neurology as OP  - PT recommended /Rehab: 4A       Change in mental status (resolved)  - U/A mildly positive.   - will check B12, folate, TSH, syphilis  - will get EEG, MRI brain w/o contrast  - DC abx.     DM II  - last evening, 173 this morining.   -Started 12 Lantus evenings, 4 lispro with meals.     Parkinson disease  - c/w Sinemet, Comtan  - start on Exelon patch.     HTN  - c/w lisinopril, coreg      DVT ppx: lovenox  GI ppx: PPI  from home  low carb diet   PT rehab

## 2018-06-24 LAB
ALBUMIN SERPL ELPH-MCNC: 3 G/DL — LOW (ref 3.5–5.2)
ALP SERPL-CCNC: 54 U/L — SIGNIFICANT CHANGE UP (ref 30–115)
ALT FLD-CCNC: 9 U/L — SIGNIFICANT CHANGE UP (ref 0–41)
ANION GAP SERPL CALC-SCNC: 11 MMOL/L — SIGNIFICANT CHANGE UP (ref 7–14)
AST SERPL-CCNC: 33 U/L — SIGNIFICANT CHANGE UP (ref 0–41)
BILIRUB SERPL-MCNC: 0.3 MG/DL — SIGNIFICANT CHANGE UP (ref 0.2–1.2)
BUN SERPL-MCNC: 17 MG/DL — SIGNIFICANT CHANGE UP (ref 10–20)
CALCIUM SERPL-MCNC: 8.1 MG/DL — LOW (ref 8.5–10.1)
CHLORIDE SERPL-SCNC: 107 MMOL/L — SIGNIFICANT CHANGE UP (ref 98–110)
CO2 SERPL-SCNC: 25 MMOL/L — SIGNIFICANT CHANGE UP (ref 17–32)
CREAT SERPL-MCNC: 1 MG/DL — SIGNIFICANT CHANGE UP (ref 0.7–1.5)
ESTIMATED AVERAGE GLUCOSE: 114 MG/DL — SIGNIFICANT CHANGE UP (ref 68–114)
GLUCOSE SERPL-MCNC: 108 MG/DL — HIGH (ref 70–99)
HBA1C BLD-MCNC: 5.6 % — SIGNIFICANT CHANGE UP (ref 4–5.6)
HCT VFR BLD CALC: 36.1 % — LOW (ref 42–52)
HGB BLD-MCNC: 12.2 G/DL — LOW (ref 14–18)
MCHC RBC-ENTMCNC: 31.9 PG — HIGH (ref 27–31)
MCHC RBC-ENTMCNC: 33.8 G/DL — SIGNIFICANT CHANGE UP (ref 32–37)
MCV RBC AUTO: 94.5 FL — HIGH (ref 80–94)
NRBC # BLD: 0 /100 WBCS — SIGNIFICANT CHANGE UP (ref 0–0)
PLATELET # BLD AUTO: 142 K/UL — SIGNIFICANT CHANGE UP (ref 130–400)
POTASSIUM SERPL-MCNC: 4.1 MMOL/L — SIGNIFICANT CHANGE UP (ref 3.5–5)
POTASSIUM SERPL-SCNC: 4.1 MMOL/L — SIGNIFICANT CHANGE UP (ref 3.5–5)
PROT SERPL-MCNC: 5.1 G/DL — LOW (ref 6–8)
RBC # BLD: 3.82 M/UL — LOW (ref 4.7–6.1)
RBC # FLD: 12.4 % — SIGNIFICANT CHANGE UP (ref 11.5–14.5)
SODIUM SERPL-SCNC: 143 MMOL/L — SIGNIFICANT CHANGE UP (ref 135–146)
WBC # BLD: 10.11 K/UL — SIGNIFICANT CHANGE UP (ref 4.8–10.8)
WBC # FLD AUTO: 10.11 K/UL — SIGNIFICANT CHANGE UP (ref 4.8–10.8)

## 2018-06-24 RX ADMIN — Medication 4 UNIT(S): at 13:23

## 2018-06-24 RX ADMIN — ESCITALOPRAM OXALATE 10 MILLIGRAM(S): 10 TABLET, FILM COATED ORAL at 11:48

## 2018-06-24 RX ADMIN — Medication 1000 UNIT(S): at 11:47

## 2018-06-24 RX ADMIN — OXYCODONE AND ACETAMINOPHEN 1 TABLET(S): 5; 325 TABLET ORAL at 10:22

## 2018-06-24 RX ADMIN — PANTOPRAZOLE SODIUM 40 MILLIGRAM(S): 20 TABLET, DELAYED RELEASE ORAL at 06:24

## 2018-06-24 RX ADMIN — SENNA PLUS 1 TABLET(S): 8.6 TABLET ORAL at 11:47

## 2018-06-24 RX ADMIN — ATORVASTATIN CALCIUM 40 MILLIGRAM(S): 80 TABLET, FILM COATED ORAL at 22:51

## 2018-06-24 RX ADMIN — Medication 100 MILLIGRAM(S): at 06:24

## 2018-06-24 RX ADMIN — CARBIDOPA AND LEVODOPA 1 TABLET(S): 25; 100 TABLET ORAL at 23:10

## 2018-06-24 RX ADMIN — OXYCODONE AND ACETAMINOPHEN 1 TABLET(S): 5; 325 TABLET ORAL at 22:48

## 2018-06-24 RX ADMIN — MAGNESIUM HYDROXIDE 30 MILLILITER(S): 400 TABLET, CHEWABLE ORAL at 10:24

## 2018-06-24 RX ADMIN — CARBIDOPA AND LEVODOPA 1 TABLET(S): 25; 100 TABLET ORAL at 18:04

## 2018-06-24 RX ADMIN — CARVEDILOL PHOSPHATE 12.5 MILLIGRAM(S): 80 CAPSULE, EXTENDED RELEASE ORAL at 18:04

## 2018-06-24 RX ADMIN — CARBIDOPA AND LEVODOPA 1 TABLET(S): 25; 100 TABLET ORAL at 11:45

## 2018-06-24 RX ADMIN — CARVEDILOL PHOSPHATE 12.5 MILLIGRAM(S): 80 CAPSULE, EXTENDED RELEASE ORAL at 06:24

## 2018-06-24 RX ADMIN — Medication 20 MILLIGRAM(S): at 06:24

## 2018-06-24 RX ADMIN — CARBIDOPA AND LEVODOPA 1 TABLET(S): 25; 100 TABLET ORAL at 06:24

## 2018-06-24 RX ADMIN — ENOXAPARIN SODIUM 40 MILLIGRAM(S): 100 INJECTION SUBCUTANEOUS at 11:46

## 2018-06-24 RX ADMIN — ENTACAPONE 200 MILLIGRAM(S): 200 TABLET, FILM COATED ORAL at 23:10

## 2018-06-24 RX ADMIN — INSULIN GLARGINE 12 UNIT(S): 100 INJECTION, SOLUTION SUBCUTANEOUS at 22:52

## 2018-06-24 RX ADMIN — ENTACAPONE 200 MILLIGRAM(S): 200 TABLET, FILM COATED ORAL at 11:46

## 2018-06-24 RX ADMIN — Medication 100 MILLIGRAM(S): at 18:04

## 2018-06-24 RX ADMIN — OXYCODONE AND ACETAMINOPHEN 1 TABLET(S): 5; 325 TABLET ORAL at 10:50

## 2018-06-24 RX ADMIN — Medication 4 UNIT(S): at 18:00

## 2018-06-24 RX ADMIN — ENTACAPONE 200 MILLIGRAM(S): 200 TABLET, FILM COATED ORAL at 06:24

## 2018-06-24 RX ADMIN — ENTACAPONE 200 MILLIGRAM(S): 200 TABLET, FILM COATED ORAL at 18:04

## 2018-06-24 RX ADMIN — LISINOPRIL 20 MILLIGRAM(S): 2.5 TABLET ORAL at 06:24

## 2018-06-24 NOTE — PROGRESS NOTE ADULT - SUBJECTIVE AND OBJECTIVE BOX
ZAKIA MILLS  68y  Male  admitted after fall w acute gouty arthritis  has poorly controlled parkinsons w gait instability  rt hemiplegia after cva  in pain today,knee, left elbow, c/o dizziness, poor balance  still no BM, needs mom        INTERVAL EVENTS:    T(C): 36 (06-24-18 @ 05:30), Max: 36.5 (06-23-18 @ 20:50)  HR: 54 (06-24-18 @ 05:30) (54 - 64)  BP: 153/80 (06-24-18 @ 05:30) (135/75 - 153/80)  RR: 18 (06-24-18 @ 05:30) (18 - 18)  SpO2: 96% (06-24-18 @ 05:30) (95% - 96%)  Wt(kg): --Vital Signs Last 24 Hrs  T(C): 36 (24 Jun 2018 05:30), Max: 36.5 (23 Jun 2018 20:50)  T(F): 96.8 (24 Jun 2018 05:30), Max: 97.7 (23 Jun 2018 20:50)  HR: 54 (24 Jun 2018 05:30) (54 - 64)  BP: 153/80 (24 Jun 2018 05:30) (135/75 - 153/80)  BP(mean): --  RR: 18 (24 Jun 2018 05:30) (18 - 18)  SpO2: 96% (24 Jun 2018 05:30) (95% - 96%)    PHYSICAL EXAM:  GENERAL: resting comfortably  NECK: Supple, No JVD, Normal thyroid  CHEST/LUNG: Clear; No crackles or wheezing  HEART: S1, S2, Regular rate and rhythm;   ABDOMEN: Soft, Nontender, Nondistended; Bowel sounds present  EXTREMITIES: No clubbing, cyanosis, or edema,restricted rom , left knee still warm and tender  SKIN: No rashes or lesions    LABS:                          12.2   10.11 )-----------( 142      ( 24 Jun 2018 05:50 )             36.1     06-24    143  |  107  |  17  ----------------------------<  108<H>  4.1   |  25  |  1.0    Ca    8.1<L>      24 Jun 2018 05:50    TPro  5.1<L>  /  Alb  3.0<L>  /  TBili  0.3  /  DBili  x   /  AST  33  /  ALT  9   /  AlkPhos  54  06-24              atorvastatin Oral Tab/Cap - Peds 40 milliGRAM(s) Oral daily  carbidopa/levodopa  25/100 1 Tablet(s) Oral every 6 hours  carvedilol 12.5 milliGRAM(s) Oral every 12 hours  cholecalciferol 1000 Unit(s) Oral daily  dextrose 40% Gel 15 Gram(s) Oral once PRN  dextrose 5%. 1000 milliLiter(s) IV Continuous <Continuous>  dextrose 50% Injectable 12.5 Gram(s) IV Push once  dextrose 50% Injectable 25 Gram(s) IV Push once  dextrose 50% Injectable 25 Gram(s) IV Push once  docusate sodium 100 milliGRAM(s) Oral two times a day  enoxaparin Injectable 40 milliGRAM(s) SubCutaneous daily  entacapone 200 milliGRAM(s) Oral every 6 hours  escitalopram 10 milliGRAM(s) Oral daily  glucagon  Injectable 1 milliGRAM(s) IntraMuscular once PRN  insulin glargine Injectable (LANTUS) 12 Unit(s) SubCutaneous at bedtime  insulin lispro Injectable (HumaLOG) 4 Unit(s) SubCutaneous before breakfast  insulin lispro Injectable (HumaLOG) 4 Unit(s) SubCutaneous before lunch  insulin lispro Injectable (HumaLOG) 4 Unit(s) SubCutaneous before dinner  lisinopril 20 milliGRAM(s) Oral daily  magnesium hydroxide Suspension 30 milliLiter(s) Oral daily PRN  oxyCODONE    5 mG/acetaminophen 325 mG 1 Tablet(s) Oral every 4 hours PRN  pantoprazole    Tablet 40 milliGRAM(s) Oral before breakfast  predniSONE   Tablet   Oral   predniSONE   Tablet 20 milliGRAM(s) Oral daily  rivastigmine patch  4.6 mG/24 Hr(s) 1 Patch Transdermal every 24 hours  senna 1 Tablet(s) Oral daily      RADIOLOGY & ADDITIONAL TESTS:    case discussed with the resident  Available consult notes reviewed  improving, but not stable for home d/c- needs pt, good candidate for INPATIENT rehab!!!   please reconsult 4A    Assessment and plan:

## 2018-06-25 ENCOUNTER — INPATIENT (INPATIENT)
Facility: HOSPITAL | Age: 68
LOS: 10 days | Discharge: HOME | End: 2018-07-06
Attending: PHYSICAL MEDICINE & REHABILITATION | Admitting: PHYSICAL MEDICINE & REHABILITATION

## 2018-06-25 VITALS
RESPIRATION RATE: 18 BRPM | SYSTOLIC BLOOD PRESSURE: 129 MMHG | TEMPERATURE: 99 F | HEART RATE: 100 BPM | DIASTOLIC BLOOD PRESSURE: 64 MMHG

## 2018-06-25 DIAGNOSIS — Z88.2 ALLERGY STATUS TO SULFONAMIDES: ICD-10-CM

## 2018-06-25 DIAGNOSIS — Z79.82 LONG TERM (CURRENT) USE OF ASPIRIN: ICD-10-CM

## 2018-06-25 DIAGNOSIS — G20 PARKINSON'S DISEASE: ICD-10-CM

## 2018-06-25 DIAGNOSIS — F32.9 MAJOR DEPRESSIVE DISORDER, SINGLE EPISODE, UNSPECIFIED: ICD-10-CM

## 2018-06-25 DIAGNOSIS — Z91.81 HISTORY OF FALLING: ICD-10-CM

## 2018-06-25 DIAGNOSIS — F09 UNSPECIFIED MENTAL DISORDER DUE TO KNOWN PHYSIOLOGICAL CONDITION: ICD-10-CM

## 2018-06-25 DIAGNOSIS — I10 ESSENTIAL (PRIMARY) HYPERTENSION: ICD-10-CM

## 2018-06-25 DIAGNOSIS — Z79.52 LONG TERM (CURRENT) USE OF SYSTEMIC STEROIDS: ICD-10-CM

## 2018-06-25 DIAGNOSIS — K59.00 CONSTIPATION, UNSPECIFIED: ICD-10-CM

## 2018-06-25 DIAGNOSIS — E11.9 TYPE 2 DIABETES MELLITUS WITHOUT COMPLICATIONS: ICD-10-CM

## 2018-06-25 DIAGNOSIS — Z79.84 LONG TERM (CURRENT) USE OF ORAL HYPOGLYCEMIC DRUGS: ICD-10-CM

## 2018-06-25 DIAGNOSIS — Z88.0 ALLERGY STATUS TO PENICILLIN: ICD-10-CM

## 2018-06-25 DIAGNOSIS — E78.5 HYPERLIPIDEMIA, UNSPECIFIED: ICD-10-CM

## 2018-06-25 DIAGNOSIS — M10.9 GOUT, UNSPECIFIED: ICD-10-CM

## 2018-06-25 DIAGNOSIS — I69.351 HEMIPLEGIA AND HEMIPARESIS FOLLOWING CEREBRAL INFARCTION AFFECTING RIGHT DOMINANT SIDE: ICD-10-CM

## 2018-06-25 LAB
ALBUMIN SERPL ELPH-MCNC: 3.1 G/DL — LOW (ref 3.5–5.2)
ALP SERPL-CCNC: 51 U/L — SIGNIFICANT CHANGE UP (ref 30–115)
ALT FLD-CCNC: 11 U/L — SIGNIFICANT CHANGE UP (ref 0–41)
ANION GAP SERPL CALC-SCNC: 10 MMOL/L — SIGNIFICANT CHANGE UP (ref 7–14)
AST SERPL-CCNC: 20 U/L — SIGNIFICANT CHANGE UP (ref 0–41)
BILIRUB SERPL-MCNC: 0.5 MG/DL — SIGNIFICANT CHANGE UP (ref 0.2–1.2)
BUN SERPL-MCNC: 13 MG/DL — SIGNIFICANT CHANGE UP (ref 10–20)
CALCIUM SERPL-MCNC: 8.3 MG/DL — LOW (ref 8.5–10.1)
CHLORIDE SERPL-SCNC: 104 MMOL/L — SIGNIFICANT CHANGE UP (ref 98–110)
CO2 SERPL-SCNC: 29 MMOL/L — SIGNIFICANT CHANGE UP (ref 17–32)
CREAT SERPL-MCNC: 0.9 MG/DL — SIGNIFICANT CHANGE UP (ref 0.7–1.5)
CULTURE RESULTS: NO GROWTH — SIGNIFICANT CHANGE UP
GLUCOSE SERPL-MCNC: 71 MG/DL — SIGNIFICANT CHANGE UP (ref 70–99)
HCT VFR BLD CALC: 38 % — LOW (ref 42–52)
HGB BLD-MCNC: 12.9 G/DL — LOW (ref 14–18)
MCHC RBC-ENTMCNC: 32.1 PG — HIGH (ref 27–31)
MCHC RBC-ENTMCNC: 33.9 G/DL — SIGNIFICANT CHANGE UP (ref 32–37)
MCV RBC AUTO: 94.5 FL — HIGH (ref 80–94)
NRBC # BLD: 0 /100 WBCS — SIGNIFICANT CHANGE UP (ref 0–0)
PLATELET # BLD AUTO: 177 K/UL — SIGNIFICANT CHANGE UP (ref 130–400)
POTASSIUM SERPL-MCNC: 4.3 MMOL/L — SIGNIFICANT CHANGE UP (ref 3.5–5)
POTASSIUM SERPL-SCNC: 4.3 MMOL/L — SIGNIFICANT CHANGE UP (ref 3.5–5)
PROT SERPL-MCNC: 5.2 G/DL — LOW (ref 6–8)
RBC # BLD: 4.02 M/UL — LOW (ref 4.7–6.1)
RBC # FLD: 12.1 % — SIGNIFICANT CHANGE UP (ref 11.5–14.5)
SODIUM SERPL-SCNC: 143 MMOL/L — SIGNIFICANT CHANGE UP (ref 135–146)
SPECIMEN SOURCE: SIGNIFICANT CHANGE UP
WBC # BLD: 11.39 K/UL — HIGH (ref 4.8–10.8)
WBC # FLD AUTO: 11.39 K/UL — HIGH (ref 4.8–10.8)

## 2018-06-25 RX ORDER — PANTOPRAZOLE SODIUM 20 MG/1
40 TABLET, DELAYED RELEASE ORAL
Qty: 0 | Refills: 0 | Status: DISCONTINUED | OUTPATIENT
Start: 2018-06-25 | End: 2018-06-25

## 2018-06-25 RX ORDER — PANTOPRAZOLE SODIUM 20 MG/1
40 TABLET, DELAYED RELEASE ORAL
Qty: 0 | Refills: 0 | Status: DISCONTINUED | OUTPATIENT
Start: 2018-06-25 | End: 2018-07-06

## 2018-06-25 RX ORDER — CHOLECALCIFEROL (VITAMIN D3) 125 MCG
1000 CAPSULE ORAL DAILY
Qty: 0 | Refills: 0 | Status: DISCONTINUED | OUTPATIENT
Start: 2018-06-25 | End: 2018-07-06

## 2018-06-25 RX ORDER — ATORVASTATIN CALCIUM 80 MG/1
40 TABLET, FILM COATED ORAL DAILY
Qty: 0 | Refills: 0 | Status: DISCONTINUED | OUTPATIENT
Start: 2018-06-25 | End: 2018-06-26

## 2018-06-25 RX ORDER — DOCUSATE SODIUM 100 MG
100 CAPSULE ORAL
Qty: 0 | Refills: 0 | Status: DISCONTINUED | OUTPATIENT
Start: 2018-06-25 | End: 2018-06-26

## 2018-06-25 RX ORDER — CARVEDILOL PHOSPHATE 80 MG/1
12.5 CAPSULE, EXTENDED RELEASE ORAL EVERY 12 HOURS
Qty: 0 | Refills: 0 | Status: DISCONTINUED | OUTPATIENT
Start: 2018-06-25 | End: 2018-07-06

## 2018-06-25 RX ORDER — MAGNESIUM HYDROXIDE 400 MG/1
30 TABLET, CHEWABLE ORAL DAILY
Qty: 0 | Refills: 0 | Status: DISCONTINUED | OUTPATIENT
Start: 2018-06-25 | End: 2018-07-06

## 2018-06-25 RX ORDER — ENTACAPONE 200 MG/1
200 TABLET, FILM COATED ORAL EVERY 6 HOURS
Qty: 0 | Refills: 0 | Status: DISCONTINUED | OUTPATIENT
Start: 2018-06-25 | End: 2018-07-06

## 2018-06-25 RX ORDER — ACETAMINOPHEN 500 MG
650 TABLET ORAL EVERY 6 HOURS
Qty: 0 | Refills: 0 | Status: DISCONTINUED | OUTPATIENT
Start: 2018-06-25 | End: 2018-07-06

## 2018-06-25 RX ORDER — CARBIDOPA AND LEVODOPA 25; 100 MG/1; MG/1
1 TABLET ORAL EVERY 6 HOURS
Qty: 0 | Refills: 0 | Status: DISCONTINUED | OUTPATIENT
Start: 2018-06-25 | End: 2018-06-26

## 2018-06-25 RX ORDER — ENOXAPARIN SODIUM 100 MG/ML
40 INJECTION SUBCUTANEOUS DAILY
Qty: 0 | Refills: 0 | Status: DISCONTINUED | OUTPATIENT
Start: 2018-06-25 | End: 2018-07-06

## 2018-06-25 RX ORDER — ASPIRIN/CALCIUM CARB/MAGNESIUM 324 MG
81 TABLET ORAL DAILY
Qty: 0 | Refills: 0 | Status: DISCONTINUED | OUTPATIENT
Start: 2018-06-25 | End: 2018-06-26

## 2018-06-25 RX ORDER — METFORMIN HYDROCHLORIDE 850 MG/1
1000 TABLET ORAL DAILY
Qty: 0 | Refills: 0 | Status: DISCONTINUED | OUTPATIENT
Start: 2018-06-25 | End: 2018-06-26

## 2018-06-25 RX ORDER — METFORMIN HYDROCHLORIDE 850 MG/1
1000 TABLET ORAL DAILY
Qty: 0 | Refills: 0 | Status: DISCONTINUED | OUTPATIENT
Start: 2018-06-25 | End: 2018-06-25

## 2018-06-25 RX ORDER — SENNA PLUS 8.6 MG/1
12 TABLET ORAL DAILY
Qty: 0 | Refills: 0 | Status: DISCONTINUED | OUTPATIENT
Start: 2018-06-25 | End: 2018-06-26

## 2018-06-25 RX ORDER — LISINOPRIL 2.5 MG/1
20 TABLET ORAL DAILY
Qty: 0 | Refills: 0 | Status: DISCONTINUED | OUTPATIENT
Start: 2018-06-25 | End: 2018-07-06

## 2018-06-25 RX ORDER — ESCITALOPRAM OXALATE 10 MG/1
10 TABLET, FILM COATED ORAL DAILY
Qty: 0 | Refills: 0 | Status: DISCONTINUED | OUTPATIENT
Start: 2018-06-25 | End: 2018-07-06

## 2018-06-25 RX ORDER — OXYCODONE HYDROCHLORIDE 5 MG/1
5 TABLET ORAL EVERY 4 HOURS
Qty: 0 | Refills: 0 | Status: DISCONTINUED | OUTPATIENT
Start: 2018-06-25 | End: 2018-06-29

## 2018-06-25 RX ADMIN — Medication 100 MILLIGRAM(S): at 18:01

## 2018-06-25 RX ADMIN — CARVEDILOL PHOSPHATE 12.5 MILLIGRAM(S): 80 CAPSULE, EXTENDED RELEASE ORAL at 05:52

## 2018-06-25 RX ADMIN — ENTACAPONE 200 MILLIGRAM(S): 200 TABLET, FILM COATED ORAL at 05:52

## 2018-06-25 RX ADMIN — CARBIDOPA AND LEVODOPA 1 TABLET(S): 25; 100 TABLET ORAL at 05:52

## 2018-06-25 RX ADMIN — PANTOPRAZOLE SODIUM 40 MILLIGRAM(S): 20 TABLET, DELAYED RELEASE ORAL at 06:10

## 2018-06-25 RX ADMIN — ENTACAPONE 200 MILLIGRAM(S): 200 TABLET, FILM COATED ORAL at 18:02

## 2018-06-25 RX ADMIN — SENNA PLUS 1 TABLET(S): 8.6 TABLET ORAL at 11:45

## 2018-06-25 RX ADMIN — ENOXAPARIN SODIUM 40 MILLIGRAM(S): 100 INJECTION SUBCUTANEOUS at 11:45

## 2018-06-25 RX ADMIN — ENTACAPONE 200 MILLIGRAM(S): 200 TABLET, FILM COATED ORAL at 11:45

## 2018-06-25 RX ADMIN — CARBIDOPA AND LEVODOPA 1 TABLET(S): 25; 100 TABLET ORAL at 11:45

## 2018-06-25 RX ADMIN — METFORMIN HYDROCHLORIDE 1000 MILLIGRAM(S): 850 TABLET ORAL at 11:45

## 2018-06-25 RX ADMIN — ESCITALOPRAM OXALATE 10 MILLIGRAM(S): 10 TABLET, FILM COATED ORAL at 11:45

## 2018-06-25 RX ADMIN — LISINOPRIL 20 MILLIGRAM(S): 2.5 TABLET ORAL at 05:53

## 2018-06-25 RX ADMIN — Medication 1000 UNIT(S): at 11:45

## 2018-06-25 RX ADMIN — CARVEDILOL PHOSPHATE 12.5 MILLIGRAM(S): 80 CAPSULE, EXTENDED RELEASE ORAL at 18:02

## 2018-06-25 RX ADMIN — Medication 100 MILLIGRAM(S): at 05:52

## 2018-06-25 RX ADMIN — ATORVASTATIN CALCIUM 40 MILLIGRAM(S): 80 TABLET, FILM COATED ORAL at 21:45

## 2018-06-25 RX ADMIN — Medication 20 MILLIGRAM(S): at 05:53

## 2018-06-25 RX ADMIN — ENTACAPONE 200 MILLIGRAM(S): 200 TABLET, FILM COATED ORAL at 23:13

## 2018-06-25 RX ADMIN — RIVASTIGMINE 1 PATCH: 4.6 PATCH, EXTENDED RELEASE TRANSDERMAL at 00:19

## 2018-06-25 NOTE — PROGRESS NOTE ADULT - SUBJECTIVE AND OBJECTIVE BOX
ZAKIA MILLS  68y  Male    gouty arthritis, parkinsons, gait instability  still in significant pain, especially after exertion on saturday- really pushed himself hard to prove to himself his capability  very high risk for falls    INTERVAL EVENTS:    T(C): 35.8 (06-25-18 @ 05:38), Max: 36.4 (06-24-18 @ 14:56)  HR: 52 (06-25-18 @ 05:38) (52 - 83)  BP: 159/78 (06-25-18 @ 05:38) (158/87 - 167/97)  RR: 16 (06-25-18 @ 05:38) (16 - 18)  SpO2: 95% (06-24-18 @ 20:40) (95% - 95%)  Wt(kg): --Vital Signs Last 24 Hrs  T(C): 35.8 (25 Jun 2018 05:38), Max: 36.4 (24 Jun 2018 14:56)  T(F): 96.4 (25 Jun 2018 05:38), Max: 97.5 (24 Jun 2018 14:56)  HR: 52 (25 Jun 2018 05:38) (52 - 83)  BP: 159/78 (25 Jun 2018 05:38) (158/87 - 167/97)  BP(mean): --  RR: 16 (25 Jun 2018 05:38) (16 - 18)  SpO2: 95% (24 Jun 2018 20:40) (95% - 95%)    PHYSICAL EXAM:  GENERAL: resting comfortably  NECK: Supple, No JVD, Normal thyroid  CHEST/LUNG: Clear; No crackles or wheezing  HEART: S1, S2, Regular rate and rhythm;   ABDOMEN: Soft, Nontender, Nondistended; Bowel sounds present  EXTREMITIES: No clubbing, cyanosis, or edema  SKIN: No rashes or lesions    LABS:                          12.9   11.39 )-----------( 177      ( 25 Jun 2018 07:30 )             38.0     06-25    143  |  104  |  13  ----------------------------<  71  4.3   |  29  |  0.9    Ca    8.3<L>      25 Jun 2018 07:30    TPro  5.2<L>  /  Alb  3.1<L>  /  TBili  0.5  /  DBili  x   /  AST  20  /  ALT  11  /  AlkPhos  51  06-25              atorvastatin Oral Tab/Cap - Peds 40 milliGRAM(s) Oral daily  carbidopa/levodopa  25/100 1 Tablet(s) Oral every 6 hours  carvedilol 12.5 milliGRAM(s) Oral every 12 hours  cholecalciferol 1000 Unit(s) Oral daily  dextrose 40% Gel 15 Gram(s) Oral once PRN  dextrose 5%. 1000 milliLiter(s) IV Continuous <Continuous>  dextrose 50% Injectable 12.5 Gram(s) IV Push once  dextrose 50% Injectable 25 Gram(s) IV Push once  dextrose 50% Injectable 25 Gram(s) IV Push once  docusate sodium 100 milliGRAM(s) Oral two times a day  enoxaparin Injectable 40 milliGRAM(s) SubCutaneous daily  entacapone 200 milliGRAM(s) Oral every 6 hours  escitalopram 10 milliGRAM(s) Oral daily  glucagon  Injectable 1 milliGRAM(s) IntraMuscular once PRN  insulin glargine Injectable (LANTUS) 12 Unit(s) SubCutaneous at bedtime  insulin lispro Injectable (HumaLOG) 4 Unit(s) SubCutaneous before breakfast  insulin lispro Injectable (HumaLOG) 4 Unit(s) SubCutaneous before lunch  insulin lispro Injectable (HumaLOG) 4 Unit(s) SubCutaneous before dinner  lisinopril 20 milliGRAM(s) Oral daily  magnesium hydroxide Suspension 30 milliLiter(s) Oral daily PRN  oxyCODONE    5 mG/acetaminophen 325 mG 1 Tablet(s) Oral every 4 hours PRN  pantoprazole    Tablet 40 milliGRAM(s) Oral before breakfast  predniSONE   Tablet   Oral   rivastigmine patch  4.6 mG/24 Hr(s) 1 Patch Transdermal every 24 hours  senna 1 Tablet(s) Oral daily      RADIOLOGY & ADDITIONAL TESTS:    case discussed with the resident  Available consult notes reviewed    Assessment and plan:       not safe for home d/c  needs 4A reeval- good candidate for inpatient rehab  had good bm  still requiring pain meds  mild elevation in wbc- monitor, could be from steroids  rheuma f/u, cont tapering prednisone  hgb a1c 5.6- beware of hypoglycemia

## 2018-06-25 NOTE — PROGRESS NOTE ADULT - ASSESSMENT
SUBJECTIVE:    Patient is a 68y old Male who presents with a chief complaint of f/p fall and Lt knee pain and swelling (23 Jun 2018 14:36)    Currently admitted to medicine with the primary diagnosis of Knee pain     Today is hospital day 5d. This morning he is resting comfortably in bed and reports no new issues or overnight events.     PAST MEDICAL & SURGICAL HISTORY  Gout  Diabetes mellitus  HTN (hypertension)  CVA (cerebral vascular accident)  Parkinson disease    SOCIAL HISTORY:  Negative for smoking/alcohol/drug use.     ALLERGIES:  cefaclor (Unknown)  penicillin (Unknown)  sulfa drugs (Unknown)    MEDICATIONS:  STANDING MEDICATIONS  atorvastatin Oral Tab/Cap - Peds 40 milliGRAM(s) Oral daily  carbidopa/levodopa  25/100 1 Tablet(s) Oral every 6 hours  carvedilol 12.5 milliGRAM(s) Oral every 12 hours  cholecalciferol 1000 Unit(s) Oral daily  dextrose 5%. 1000 milliLiter(s) IV Continuous <Continuous>  dextrose 50% Injectable 12.5 Gram(s) IV Push once  dextrose 50% Injectable 25 Gram(s) IV Push once  dextrose 50% Injectable 25 Gram(s) IV Push once  docusate sodium 100 milliGRAM(s) Oral two times a day  enoxaparin Injectable 40 milliGRAM(s) SubCutaneous daily  entacapone 200 milliGRAM(s) Oral every 6 hours  escitalopram 10 milliGRAM(s) Oral daily  insulin glargine Injectable (LANTUS) 12 Unit(s) SubCutaneous at bedtime  insulin lispro Injectable (HumaLOG) 4 Unit(s) SubCutaneous before breakfast  insulin lispro Injectable (HumaLOG) 4 Unit(s) SubCutaneous before lunch  insulin lispro Injectable (HumaLOG) 4 Unit(s) SubCutaneous before dinner  lisinopril 20 milliGRAM(s) Oral daily  pantoprazole    Tablet 40 milliGRAM(s) Oral before breakfast  predniSONE   Tablet   Oral   predniSONE   Tablet 20 milliGRAM(s) Oral daily  rivastigmine patch  4.6 mG/24 Hr(s) 1 Patch Transdermal every 24 hours  senna 1 Tablet(s) Oral daily    PRN MEDICATIONS  dextrose 40% Gel 15 Gram(s) Oral once PRN  glucagon  Injectable 1 milliGRAM(s) IntraMuscular once PRN  magnesium hydroxide Suspension 30 milliLiter(s) Oral daily PRN  oxyCODONE    5 mG/acetaminophen 325 mG 1 Tablet(s) Oral every 4 hours PRN    VITALS:   T(F): 97.3  HR: 60  BP: 158/87  RR: 18  SpO2: 95%    LABS:                        12.2   10.11 )-----------( 142      ( 24 Jun 2018 05:50 )             36.1     06-24    143  |  107  |  17  ----------------------------<  108<H>  4.1   |  25  |  1.0    Ca    8.1<L>      24 Jun 2018 05:50    TPro  5.1<L>  /  Alb  3.0<L>  /  TBili  0.3  /  DBili  x   /  AST  33  /  ALT  9   /  AlkPhos  54  06-24                  RADIOLOGY:    PHYSICAL EXAM:  GEN: No acute distress  LUNGS: Clear to auscultation bilaterally   HEART: Regular  ABD: Soft, non-tender, non-distended.  EXT: NC/NC/NE/2+PP/SALMERON/Skin Intact.   NEURO: AAOX3    Intravenous access:   NG tube:   Ewing Catheter:       Assessment and plan: 66 y/o M PMH of HTN, Parkinson, DM, CVA, gout is here s/p fall , c/o Lt knee pain and swelling and also change in mental status. Admitted w acute gouty arthritis lives home alone and was found naked after fall 24 after.   Left knee swelling 2/2 to Gout  - s/p arthrocentesis- WBC- 8 K, with PMH of 60%, RBC 1000  - synovial fluid GS negative, cultures negative to date   - unlikely septic arthritis.   - ESR: 4, Uric acid 5.9.   - f/u synovial fluid Cx, no growth to date.  - X-ray neg acute fracture, finding consistent with deposition disease.   - c/w Prednisone taper  - pain control with percocet 1 tab q4h prn  - Rheumatology: do not start any ULT now, cont prednisone taper. Follow with rheumatology  as outpatient.   - Orthopedics: WBAT LLE, pain control, ice, elevation PRN, physical therapy, gentle ROM encouraged.     Recurrent falls-likely 2/2 worsening PD.   - rt hemiplegia after cva  - unwitnessed falls with post-fall confusion  -poorly controlled Parkinsons w gait instability  -Reevaluate by PT for 4A- needs rehab for parkinsons gait and balance control  -unsafe for dc home  - fall precaution  - Echo: LVEF wnl. G1DD.   -c/w Sinemet 25/100 po q6hrs, start Comtan 200mg po q6hrs with Sinemet, start Exelon TDS 4.6mg 1 patch q24hrs  - MRI brain shows extensive periventricular and subcortical white matter chronic small   vessel ischemic changes and multiple old lacunar infarcts as described   above. No acute infarcts or intracranial hemorrhage.  -f/u with neurology as OP    Change in mental status (resolved)  - U/A mildly positive s/p  received 2 days of ceftriaxone  - B12 1608, folate 5.6, TSH 1.02, syphilis non-reactive  - EEG wnl    DM II  -c/w 12 Lantus evenings, 4 lispro with meals.   -FS controlled.     HTN  - c/w lisinopril, coreg  DVT ppx: lovenox  GI ppx: PPI SUBJECTIVE:    Patient is a 68y old Male who presents with a chief complaint of f/p fall and Lt knee pain and swelling (23 Jun 2018 14:36)    Currently admitted to medicine with the primary diagnosis of Knee pain     Today is hospital day 5d. This morning he is resting comfortably in bed and reports no new issues or overnight events. PAtient notes continuous pain in L knee and L elbow. Notes he has not been walking at all the past evening.     PAST MEDICAL & SURGICAL HISTORY  Gout  Diabetes mellitus  HTN (hypertension)  CVA (cerebral vascular accident)  Parkinson disease    SOCIAL HISTORY:  Negative for smoking/alcohol/drug use.     ALLERGIES:  cefaclor (Unknown)  penicillin (Unknown)  sulfa drugs (Unknown)    MEDICATIONS:  STANDING MEDICATIONS  atorvastatin Oral Tab/Cap - Peds 40 milliGRAM(s) Oral daily  carbidopa/levodopa  25/100 1 Tablet(s) Oral every 6 hours  carvedilol 12.5 milliGRAM(s) Oral every 12 hours  cholecalciferol 1000 Unit(s) Oral daily  dextrose 5%. 1000 milliLiter(s) IV Continuous <Continuous>  dextrose 50% Injectable 12.5 Gram(s) IV Push once  dextrose 50% Injectable 25 Gram(s) IV Push once  dextrose 50% Injectable 25 Gram(s) IV Push once  docusate sodium 100 milliGRAM(s) Oral two times a day  enoxaparin Injectable 40 milliGRAM(s) SubCutaneous daily  entacapone 200 milliGRAM(s) Oral every 6 hours  escitalopram 10 milliGRAM(s) Oral daily  insulin glargine Injectable (LANTUS) 12 Unit(s) SubCutaneous at bedtime  insulin lispro Injectable (HumaLOG) 4 Unit(s) SubCutaneous before breakfast  insulin lispro Injectable (HumaLOG) 4 Unit(s) SubCutaneous before lunch  insulin lispro Injectable (HumaLOG) 4 Unit(s) SubCutaneous before dinner  lisinopril 20 milliGRAM(s) Oral daily  pantoprazole    Tablet 40 milliGRAM(s) Oral before breakfast  predniSONE   Tablet   Oral   predniSONE   Tablet 20 milliGRAM(s) Oral daily  rivastigmine patch  4.6 mG/24 Hr(s) 1 Patch Transdermal every 24 hours  senna 1 Tablet(s) Oral daily    PRN MEDICATIONS  dextrose 40% Gel 15 Gram(s) Oral once PRN  glucagon  Injectable 1 milliGRAM(s) IntraMuscular once PRN  magnesium hydroxide Suspension 30 milliLiter(s) Oral daily PRN  oxyCODONE    5 mG/acetaminophen 325 mG 1 Tablet(s) Oral every 4 hours PRN    VITALS:   T(F): 97.3  HR: 60  BP: 158/87  RR: 18  SpO2: 95%    LABS:                        12.2   10.11 )-----------( 142      ( 24 Jun 2018 05:50 )             36.1     06-24    143  |  107  |  17  ----------------------------<  108<H>  4.1   |  25  |  1.0    Ca    8.1<L>      24 Jun 2018 05:50    TPro  5.1<L>  /  Alb  3.0<L>  /  TBili  0.3  /  DBili  x   /  AST  33  /  ALT  9   /  AlkPhos  54  06-24                  RADIOLOGY:    PHYSICAL EXAM:  GEN: No acute distress  LUNGS: Clear to auscultation bilaterally   HEART: Regular  ABD: Soft, non-tender, non-distended.  EXT: NC/NC/NE/2+PP/SALMERON/Skin Intact.   NEURO: AAOX3    Intravenous access:   NG tube:   Ewing Catheter:       Assessment and plan: 68 y/o M PMH of HTN, Parkinson, DM, CVA, gout is here s/p fall , c/o Lt knee pain and swelling and also change in mental status. Admitted w acute gouty arthritis lives home alone and was found naked after fall 24 after.   Left knee swelling 2/2 to Gout  - s/p arthrocentesis- WBC- 8 K, with PMH of 60%, RBC 1000  - synovial fluid GS negative, cultures negative to date   - unlikely septic arthritis.   - ESR: 4, Uric acid 5.9.   - f/u synovial fluid Cx, no growth to date.  - X-ray neg acute fracture, finding consistent with deposition disease.   - c/w Prednisone taper  - pain control with percocet 1 tab q4h prn  - Rheumatology: do not start any ULT now, cont prednisone taper. Follow with rheumatology  as outpatient.   - Orthopedics: WBAT LLE, pain control, ice, elevation PRN, physical therapy, gentle ROM encouraged.     Recurrent falls-likely 2/2 worsening PD.   - rt hemiplegia after cva  - unwitnessed falls with post-fall confusion  -poorly controlled Parkinsons w gait instability  -Reevaluate by PT for 4A- needs rehab for parkinsons gait and balance control  -unsafe for dc home  - fall precaution  - Echo: LVEF wnl. G1DD.   -c/w Sinemet 25/100 po q6hrs, start Comtan 200mg po q6hrs with Sinemet, start Exelon TDS 4.6mg 1 patch q24hrs  - MRI brain shows extensive periventricular and subcortical white matter chronic small   vessel ischemic changes and multiple old lacunar infarcts as described   above. No acute infarcts or intracranial hemorrhage.  -f/u with neurology as OP    Change in mental status (resolved)  - U/A mildly positive s/p  received 2 days of ceftriaxone  - B12 1608, folate 5.6, TSH 1.02, syphilis non-reactive  - EEG wnl    DM II  -c/w 12 Lantus evenings, 4 lispro with meals.   -FS controlled.     HTN  - c/w lisinopril, coreg  DVT ppx: lovenox  GI ppx: PPI

## 2018-06-26 LAB
ALBUMIN SERPL ELPH-MCNC: 3.3 G/DL — LOW (ref 3.5–5.2)
ALP SERPL-CCNC: 56 U/L — SIGNIFICANT CHANGE UP (ref 30–115)
ALT FLD-CCNC: 46 U/L — HIGH (ref 0–41)
ANION GAP SERPL CALC-SCNC: 14 MMOL/L — SIGNIFICANT CHANGE UP (ref 7–14)
AST SERPL-CCNC: 29 U/L — SIGNIFICANT CHANGE UP (ref 0–41)
BASOPHILS # BLD AUTO: 0.01 K/UL — SIGNIFICANT CHANGE UP (ref 0–0.2)
BASOPHILS NFR BLD AUTO: 0.1 % — SIGNIFICANT CHANGE UP (ref 0–1)
BILIRUB SERPL-MCNC: 0.5 MG/DL — SIGNIFICANT CHANGE UP (ref 0.2–1.2)
BUN SERPL-MCNC: 16 MG/DL — SIGNIFICANT CHANGE UP (ref 10–20)
CALCIUM SERPL-MCNC: 8.7 MG/DL — SIGNIFICANT CHANGE UP (ref 8.5–10.1)
CHLORIDE SERPL-SCNC: 102 MMOL/L — SIGNIFICANT CHANGE UP (ref 98–110)
CO2 SERPL-SCNC: 27 MMOL/L — SIGNIFICANT CHANGE UP (ref 17–32)
CREAT SERPL-MCNC: 0.9 MG/DL — SIGNIFICANT CHANGE UP (ref 0.7–1.5)
CULTURE RESULTS: SIGNIFICANT CHANGE UP
EOSINOPHIL # BLD AUTO: 0.13 K/UL — SIGNIFICANT CHANGE UP (ref 0–0.7)
EOSINOPHIL NFR BLD AUTO: 1.1 % — SIGNIFICANT CHANGE UP (ref 0–8)
GLUCOSE SERPL-MCNC: 75 MG/DL — SIGNIFICANT CHANGE UP (ref 70–99)
HCT VFR BLD CALC: 37.7 % — LOW (ref 42–52)
HGB BLD-MCNC: 12.8 G/DL — LOW (ref 14–18)
IMM GRANULOCYTES NFR BLD AUTO: 0.9 % — HIGH (ref 0.1–0.3)
LYMPHOCYTES # BLD AUTO: 36 % — SIGNIFICANT CHANGE UP (ref 20.5–51.1)
LYMPHOCYTES # BLD AUTO: 4.19 K/UL — HIGH (ref 1.2–3.4)
MAGNESIUM SERPL-MCNC: 1.9 MG/DL — SIGNIFICANT CHANGE UP (ref 1.8–2.4)
MCHC RBC-ENTMCNC: 31.8 PG — HIGH (ref 27–31)
MCHC RBC-ENTMCNC: 34 G/DL — SIGNIFICANT CHANGE UP (ref 32–37)
MCV RBC AUTO: 93.5 FL — SIGNIFICANT CHANGE UP (ref 80–94)
MONOCYTES # BLD AUTO: 0.95 K/UL — HIGH (ref 0.1–0.6)
MONOCYTES NFR BLD AUTO: 8.2 % — SIGNIFICANT CHANGE UP (ref 1.7–9.3)
NEUTROPHILS # BLD AUTO: 6.25 K/UL — SIGNIFICANT CHANGE UP (ref 1.4–6.5)
NEUTROPHILS NFR BLD AUTO: 53.7 % — SIGNIFICANT CHANGE UP (ref 42.2–75.2)
PLATELET # BLD AUTO: 192 K/UL — SIGNIFICANT CHANGE UP (ref 130–400)
POTASSIUM SERPL-MCNC: 3.9 MMOL/L — SIGNIFICANT CHANGE UP (ref 3.5–5)
POTASSIUM SERPL-SCNC: 3.9 MMOL/L — SIGNIFICANT CHANGE UP (ref 3.5–5)
PROT SERPL-MCNC: 5.4 G/DL — LOW (ref 6–8)
RBC # BLD: 4.03 M/UL — LOW (ref 4.7–6.1)
RBC # FLD: 12.1 % — SIGNIFICANT CHANGE UP (ref 11.5–14.5)
SODIUM SERPL-SCNC: 143 MMOL/L — SIGNIFICANT CHANGE UP (ref 135–146)
SPECIMEN SOURCE: SIGNIFICANT CHANGE UP
WBC # BLD: 11.63 K/UL — HIGH (ref 4.8–10.8)
WBC # FLD AUTO: 11.63 K/UL — HIGH (ref 4.8–10.8)

## 2018-06-26 RX ORDER — ASPIRIN/CALCIUM CARB/MAGNESIUM 324 MG
81 TABLET ORAL DAILY
Qty: 0 | Refills: 0 | Status: DISCONTINUED | OUTPATIENT
Start: 2018-06-26 | End: 2018-07-06

## 2018-06-26 RX ORDER — SENNA PLUS 8.6 MG/1
2 TABLET ORAL AT BEDTIME
Qty: 0 | Refills: 0 | Status: DISCONTINUED | OUTPATIENT
Start: 2018-06-26 | End: 2018-06-26

## 2018-06-26 RX ORDER — ATORVASTATIN CALCIUM 80 MG/1
40 TABLET, FILM COATED ORAL AT BEDTIME
Qty: 0 | Refills: 0 | Status: DISCONTINUED | OUTPATIENT
Start: 2018-06-26 | End: 2018-07-06

## 2018-06-26 RX ORDER — POLYETHYLENE GLYCOL 3350 17 G/17G
17 POWDER, FOR SOLUTION ORAL
Qty: 0 | Refills: 0 | Status: DISCONTINUED | OUTPATIENT
Start: 2018-06-26 | End: 2018-07-06

## 2018-06-26 RX ORDER — SENNA PLUS 8.6 MG/1
2 TABLET ORAL AT BEDTIME
Qty: 0 | Refills: 0 | Status: DISCONTINUED | OUTPATIENT
Start: 2018-06-26 | End: 2018-07-06

## 2018-06-26 RX ORDER — DOCUSATE SODIUM 100 MG
100 CAPSULE ORAL THREE TIMES A DAY
Qty: 0 | Refills: 0 | Status: DISCONTINUED | OUTPATIENT
Start: 2018-06-26 | End: 2018-07-06

## 2018-06-26 RX ORDER — CARBIDOPA AND LEVODOPA 25; 100 MG/1; MG/1
1 TABLET ORAL
Qty: 0 | Refills: 0 | Status: DISCONTINUED | OUTPATIENT
Start: 2018-06-26 | End: 2018-07-06

## 2018-06-26 RX ORDER — METFORMIN HYDROCHLORIDE 850 MG/1
1000 TABLET ORAL
Qty: 0 | Refills: 0 | Status: DISCONTINUED | OUTPATIENT
Start: 2018-06-26 | End: 2018-07-06

## 2018-06-26 RX ORDER — RIVASTIGMINE 4.6 MG/24H
1 PATCH, EXTENDED RELEASE TRANSDERMAL EVERY 24 HOURS
Qty: 0 | Refills: 0 | Status: DISCONTINUED | OUTPATIENT
Start: 2018-06-26 | End: 2018-07-06

## 2018-06-26 RX ADMIN — CARVEDILOL PHOSPHATE 12.5 MILLIGRAM(S): 80 CAPSULE, EXTENDED RELEASE ORAL at 17:53

## 2018-06-26 RX ADMIN — Medication 81 MILLIGRAM(S): at 15:24

## 2018-06-26 RX ADMIN — Medication 10 MILLIGRAM(S): at 06:08

## 2018-06-26 RX ADMIN — OXYCODONE HYDROCHLORIDE 5 MILLIGRAM(S): 5 TABLET ORAL at 08:51

## 2018-06-26 RX ADMIN — Medication 100 MILLIGRAM(S): at 21:45

## 2018-06-26 RX ADMIN — RIVASTIGMINE 1 PATCH: 4.6 PATCH, EXTENDED RELEASE TRANSDERMAL at 11:45

## 2018-06-26 RX ADMIN — Medication 1000 UNIT(S): at 11:48

## 2018-06-26 RX ADMIN — CARBIDOPA AND LEVODOPA 1 TABLET(S): 25; 100 TABLET ORAL at 23:14

## 2018-06-26 RX ADMIN — POLYETHYLENE GLYCOL 3350 17 GRAM(S): 17 POWDER, FOR SOLUTION ORAL at 17:55

## 2018-06-26 RX ADMIN — Medication 100 MILLIGRAM(S): at 06:08

## 2018-06-26 RX ADMIN — CARBIDOPA AND LEVODOPA 1 TABLET(S): 25; 100 TABLET ORAL at 09:30

## 2018-06-26 RX ADMIN — ENOXAPARIN SODIUM 40 MILLIGRAM(S): 100 INJECTION SUBCUTANEOUS at 11:51

## 2018-06-26 RX ADMIN — METFORMIN HYDROCHLORIDE 1000 MILLIGRAM(S): 850 TABLET ORAL at 11:46

## 2018-06-26 RX ADMIN — OXYCODONE HYDROCHLORIDE 5 MILLIGRAM(S): 5 TABLET ORAL at 09:00

## 2018-06-26 RX ADMIN — CARBIDOPA AND LEVODOPA 1 TABLET(S): 25; 100 TABLET ORAL at 17:52

## 2018-06-26 RX ADMIN — LISINOPRIL 20 MILLIGRAM(S): 2.5 TABLET ORAL at 06:08

## 2018-06-26 RX ADMIN — CARBIDOPA AND LEVODOPA 1 TABLET(S): 25; 100 TABLET ORAL at 11:47

## 2018-06-26 RX ADMIN — ATORVASTATIN CALCIUM 40 MILLIGRAM(S): 80 TABLET, FILM COATED ORAL at 21:45

## 2018-06-26 RX ADMIN — ENTACAPONE 200 MILLIGRAM(S): 200 TABLET, FILM COATED ORAL at 15:22

## 2018-06-26 RX ADMIN — ENTACAPONE 200 MILLIGRAM(S): 200 TABLET, FILM COATED ORAL at 17:51

## 2018-06-26 RX ADMIN — Medication 100 MILLIGRAM(S): at 15:23

## 2018-06-26 RX ADMIN — SENNA PLUS 2 TABLET(S): 8.6 TABLET ORAL at 21:45

## 2018-06-26 RX ADMIN — ENTACAPONE 200 MILLIGRAM(S): 200 TABLET, FILM COATED ORAL at 23:14

## 2018-06-26 RX ADMIN — ENTACAPONE 200 MILLIGRAM(S): 200 TABLET, FILM COATED ORAL at 06:08

## 2018-06-26 RX ADMIN — PANTOPRAZOLE SODIUM 40 MILLIGRAM(S): 20 TABLET, DELAYED RELEASE ORAL at 06:07

## 2018-06-26 RX ADMIN — CARVEDILOL PHOSPHATE 12.5 MILLIGRAM(S): 80 CAPSULE, EXTENDED RELEASE ORAL at 06:08

## 2018-06-26 RX ADMIN — ESCITALOPRAM OXALATE 10 MILLIGRAM(S): 10 TABLET, FILM COATED ORAL at 11:52

## 2018-06-27 RX ORDER — LACTULOSE 10 G/15ML
20 SOLUTION ORAL
Qty: 0 | Refills: 0 | Status: COMPLETED | OUTPATIENT
Start: 2018-06-27 | End: 2018-06-28

## 2018-06-27 RX ORDER — MINERAL OIL
133 OIL (ML) MISCELLANEOUS ONCE
Qty: 0 | Refills: 0 | Status: DISCONTINUED | OUTPATIENT
Start: 2018-06-28 | End: 2018-07-06

## 2018-06-27 RX ADMIN — CARBIDOPA AND LEVODOPA 1 TABLET(S): 25; 100 TABLET ORAL at 18:16

## 2018-06-27 RX ADMIN — ESCITALOPRAM OXALATE 10 MILLIGRAM(S): 10 TABLET, FILM COATED ORAL at 12:09

## 2018-06-27 RX ADMIN — Medication 100 MILLIGRAM(S): at 12:11

## 2018-06-27 RX ADMIN — CARBIDOPA AND LEVODOPA 1 TABLET(S): 25; 100 TABLET ORAL at 21:31

## 2018-06-27 RX ADMIN — ENOXAPARIN SODIUM 40 MILLIGRAM(S): 100 INJECTION SUBCUTANEOUS at 12:10

## 2018-06-27 RX ADMIN — Medication 10 MILLIGRAM(S): at 13:38

## 2018-06-27 RX ADMIN — ENTACAPONE 200 MILLIGRAM(S): 200 TABLET, FILM COATED ORAL at 21:31

## 2018-06-27 RX ADMIN — SENNA PLUS 2 TABLET(S): 8.6 TABLET ORAL at 21:30

## 2018-06-27 RX ADMIN — Medication 1000 UNIT(S): at 12:10

## 2018-06-27 RX ADMIN — Medication 100 MILLIGRAM(S): at 21:30

## 2018-06-27 RX ADMIN — CARBIDOPA AND LEVODOPA 1 TABLET(S): 25; 100 TABLET ORAL at 12:10

## 2018-06-27 RX ADMIN — LACTULOSE 20 GRAM(S): 10 SOLUTION ORAL at 18:16

## 2018-06-27 RX ADMIN — Medication 10 MILLIGRAM(S): at 06:20

## 2018-06-27 RX ADMIN — ENTACAPONE 200 MILLIGRAM(S): 200 TABLET, FILM COATED ORAL at 12:09

## 2018-06-27 RX ADMIN — LISINOPRIL 20 MILLIGRAM(S): 2.5 TABLET ORAL at 06:20

## 2018-06-27 RX ADMIN — Medication 10 MILLIGRAM(S): at 18:16

## 2018-06-27 RX ADMIN — LACTULOSE 20 GRAM(S): 10 SOLUTION ORAL at 12:15

## 2018-06-27 RX ADMIN — Medication 81 MILLIGRAM(S): at 12:10

## 2018-06-27 RX ADMIN — PANTOPRAZOLE SODIUM 40 MILLIGRAM(S): 20 TABLET, DELAYED RELEASE ORAL at 06:20

## 2018-06-27 RX ADMIN — POLYETHYLENE GLYCOL 3350 17 GRAM(S): 17 POWDER, FOR SOLUTION ORAL at 18:17

## 2018-06-27 RX ADMIN — Medication 100 MILLIGRAM(S): at 06:20

## 2018-06-27 RX ADMIN — ATORVASTATIN CALCIUM 40 MILLIGRAM(S): 80 TABLET, FILM COATED ORAL at 21:30

## 2018-06-27 RX ADMIN — ENTACAPONE 200 MILLIGRAM(S): 200 TABLET, FILM COATED ORAL at 06:20

## 2018-06-27 RX ADMIN — CARVEDILOL PHOSPHATE 12.5 MILLIGRAM(S): 80 CAPSULE, EXTENDED RELEASE ORAL at 18:17

## 2018-06-27 RX ADMIN — METFORMIN HYDROCHLORIDE 1000 MILLIGRAM(S): 850 TABLET ORAL at 07:58

## 2018-06-27 RX ADMIN — RIVASTIGMINE 1 PATCH: 4.6 PATCH, EXTENDED RELEASE TRANSDERMAL at 12:11

## 2018-06-27 RX ADMIN — CARVEDILOL PHOSPHATE 12.5 MILLIGRAM(S): 80 CAPSULE, EXTENDED RELEASE ORAL at 06:20

## 2018-06-27 RX ADMIN — CARBIDOPA AND LEVODOPA 1 TABLET(S): 25; 100 TABLET ORAL at 06:20

## 2018-06-27 RX ADMIN — ENTACAPONE 200 MILLIGRAM(S): 200 TABLET, FILM COATED ORAL at 18:16

## 2018-06-28 RX ADMIN — CARVEDILOL PHOSPHATE 12.5 MILLIGRAM(S): 80 CAPSULE, EXTENDED RELEASE ORAL at 05:17

## 2018-06-28 RX ADMIN — ENOXAPARIN SODIUM 40 MILLIGRAM(S): 100 INJECTION SUBCUTANEOUS at 12:18

## 2018-06-28 RX ADMIN — Medication 100 MILLIGRAM(S): at 22:12

## 2018-06-28 RX ADMIN — ENTACAPONE 200 MILLIGRAM(S): 200 TABLET, FILM COATED ORAL at 05:16

## 2018-06-28 RX ADMIN — Medication 100 MILLIGRAM(S): at 12:18

## 2018-06-28 RX ADMIN — SENNA PLUS 2 TABLET(S): 8.6 TABLET ORAL at 22:12

## 2018-06-28 RX ADMIN — Medication 10 MILLIGRAM(S): at 05:17

## 2018-06-28 RX ADMIN — ENTACAPONE 200 MILLIGRAM(S): 200 TABLET, FILM COATED ORAL at 17:47

## 2018-06-28 RX ADMIN — CARBIDOPA AND LEVODOPA 1 TABLET(S): 25; 100 TABLET ORAL at 05:17

## 2018-06-28 RX ADMIN — CARVEDILOL PHOSPHATE 12.5 MILLIGRAM(S): 80 CAPSULE, EXTENDED RELEASE ORAL at 17:47

## 2018-06-28 RX ADMIN — Medication 81 MILLIGRAM(S): at 12:18

## 2018-06-28 RX ADMIN — CARBIDOPA AND LEVODOPA 1 TABLET(S): 25; 100 TABLET ORAL at 17:48

## 2018-06-28 RX ADMIN — PANTOPRAZOLE SODIUM 40 MILLIGRAM(S): 20 TABLET, DELAYED RELEASE ORAL at 06:08

## 2018-06-28 RX ADMIN — ESCITALOPRAM OXALATE 10 MILLIGRAM(S): 10 TABLET, FILM COATED ORAL at 12:18

## 2018-06-28 RX ADMIN — CARBIDOPA AND LEVODOPA 1 TABLET(S): 25; 100 TABLET ORAL at 23:14

## 2018-06-28 RX ADMIN — LACTULOSE 20 GRAM(S): 10 SOLUTION ORAL at 05:20

## 2018-06-28 RX ADMIN — ATORVASTATIN CALCIUM 40 MILLIGRAM(S): 80 TABLET, FILM COATED ORAL at 22:12

## 2018-06-28 RX ADMIN — ENTACAPONE 200 MILLIGRAM(S): 200 TABLET, FILM COATED ORAL at 23:14

## 2018-06-28 RX ADMIN — Medication 1000 UNIT(S): at 12:19

## 2018-06-28 RX ADMIN — CARBIDOPA AND LEVODOPA 1 TABLET(S): 25; 100 TABLET ORAL at 12:18

## 2018-06-28 RX ADMIN — LACTULOSE 20 GRAM(S): 10 SOLUTION ORAL at 17:50

## 2018-06-28 RX ADMIN — Medication 100 MILLIGRAM(S): at 05:17

## 2018-06-28 RX ADMIN — METFORMIN HYDROCHLORIDE 1000 MILLIGRAM(S): 850 TABLET ORAL at 07:59

## 2018-06-28 RX ADMIN — LISINOPRIL 20 MILLIGRAM(S): 2.5 TABLET ORAL at 05:16

## 2018-06-28 RX ADMIN — ENTACAPONE 200 MILLIGRAM(S): 200 TABLET, FILM COATED ORAL at 12:18

## 2018-06-28 RX ADMIN — RIVASTIGMINE 1 PATCH: 4.6 PATCH, EXTENDED RELEASE TRANSDERMAL at 12:18

## 2018-06-29 RX ORDER — OXYCODONE HYDROCHLORIDE 5 MG/1
5 TABLET ORAL EVERY 4 HOURS
Qty: 0 | Refills: 0 | Status: DISCONTINUED | OUTPATIENT
Start: 2018-06-29 | End: 2018-07-05

## 2018-06-29 RX ORDER — OXYCODONE HYDROCHLORIDE 5 MG/1
5 TABLET ORAL EVERY 4 HOURS
Qty: 0 | Refills: 0 | Status: DISCONTINUED | OUTPATIENT
Start: 2018-06-29 | End: 2018-06-29

## 2018-06-29 RX ADMIN — CARBIDOPA AND LEVODOPA 1 TABLET(S): 25; 100 TABLET ORAL at 06:58

## 2018-06-29 RX ADMIN — ESCITALOPRAM OXALATE 10 MILLIGRAM(S): 10 TABLET, FILM COATED ORAL at 12:32

## 2018-06-29 RX ADMIN — CARVEDILOL PHOSPHATE 12.5 MILLIGRAM(S): 80 CAPSULE, EXTENDED RELEASE ORAL at 06:58

## 2018-06-29 RX ADMIN — Medication 100 MILLIGRAM(S): at 21:31

## 2018-06-29 RX ADMIN — Medication 81 MILLIGRAM(S): at 12:33

## 2018-06-29 RX ADMIN — Medication 100 MILLIGRAM(S): at 12:34

## 2018-06-29 RX ADMIN — Medication 1000 UNIT(S): at 12:34

## 2018-06-29 RX ADMIN — ENTACAPONE 200 MILLIGRAM(S): 200 TABLET, FILM COATED ORAL at 06:58

## 2018-06-29 RX ADMIN — CARBIDOPA AND LEVODOPA 1 TABLET(S): 25; 100 TABLET ORAL at 23:29

## 2018-06-29 RX ADMIN — CARBIDOPA AND LEVODOPA 1 TABLET(S): 25; 100 TABLET ORAL at 12:33

## 2018-06-29 RX ADMIN — Medication 100 MILLIGRAM(S): at 06:58

## 2018-06-29 RX ADMIN — ENTACAPONE 200 MILLIGRAM(S): 200 TABLET, FILM COATED ORAL at 23:29

## 2018-06-29 RX ADMIN — ATORVASTATIN CALCIUM 40 MILLIGRAM(S): 80 TABLET, FILM COATED ORAL at 21:31

## 2018-06-29 RX ADMIN — LISINOPRIL 20 MILLIGRAM(S): 2.5 TABLET ORAL at 06:58

## 2018-06-29 RX ADMIN — SENNA PLUS 2 TABLET(S): 8.6 TABLET ORAL at 21:31

## 2018-06-29 RX ADMIN — RIVASTIGMINE 1 PATCH: 4.6 PATCH, EXTENDED RELEASE TRANSDERMAL at 12:32

## 2018-06-29 RX ADMIN — ENTACAPONE 200 MILLIGRAM(S): 200 TABLET, FILM COATED ORAL at 17:39

## 2018-06-29 RX ADMIN — ENTACAPONE 200 MILLIGRAM(S): 200 TABLET, FILM COATED ORAL at 12:33

## 2018-06-29 RX ADMIN — ENOXAPARIN SODIUM 40 MILLIGRAM(S): 100 INJECTION SUBCUTANEOUS at 12:33

## 2018-06-29 RX ADMIN — CARVEDILOL PHOSPHATE 12.5 MILLIGRAM(S): 80 CAPSULE, EXTENDED RELEASE ORAL at 17:40

## 2018-06-29 RX ADMIN — CARBIDOPA AND LEVODOPA 1 TABLET(S): 25; 100 TABLET ORAL at 17:40

## 2018-06-29 RX ADMIN — METFORMIN HYDROCHLORIDE 1000 MILLIGRAM(S): 850 TABLET ORAL at 08:27

## 2018-06-29 RX ADMIN — PANTOPRAZOLE SODIUM 40 MILLIGRAM(S): 20 TABLET, DELAYED RELEASE ORAL at 06:58

## 2018-06-30 RX ADMIN — RIVASTIGMINE 1 PATCH: 4.6 PATCH, EXTENDED RELEASE TRANSDERMAL at 11:44

## 2018-06-30 RX ADMIN — Medication 100 MILLIGRAM(S): at 05:40

## 2018-06-30 RX ADMIN — ENTACAPONE 200 MILLIGRAM(S): 200 TABLET, FILM COATED ORAL at 05:40

## 2018-06-30 RX ADMIN — CARBIDOPA AND LEVODOPA 1 TABLET(S): 25; 100 TABLET ORAL at 17:09

## 2018-06-30 RX ADMIN — CARBIDOPA AND LEVODOPA 1 TABLET(S): 25; 100 TABLET ORAL at 11:46

## 2018-06-30 RX ADMIN — ENTACAPONE 200 MILLIGRAM(S): 200 TABLET, FILM COATED ORAL at 17:09

## 2018-06-30 RX ADMIN — Medication 100 MILLIGRAM(S): at 11:48

## 2018-06-30 RX ADMIN — ENTACAPONE 200 MILLIGRAM(S): 200 TABLET, FILM COATED ORAL at 11:46

## 2018-06-30 RX ADMIN — ATORVASTATIN CALCIUM 40 MILLIGRAM(S): 80 TABLET, FILM COATED ORAL at 21:15

## 2018-06-30 RX ADMIN — PANTOPRAZOLE SODIUM 40 MILLIGRAM(S): 20 TABLET, DELAYED RELEASE ORAL at 06:07

## 2018-06-30 RX ADMIN — Medication 81 MILLIGRAM(S): at 11:46

## 2018-06-30 RX ADMIN — METFORMIN HYDROCHLORIDE 1000 MILLIGRAM(S): 850 TABLET ORAL at 07:31

## 2018-06-30 RX ADMIN — CARVEDILOL PHOSPHATE 12.5 MILLIGRAM(S): 80 CAPSULE, EXTENDED RELEASE ORAL at 17:09

## 2018-06-30 RX ADMIN — LISINOPRIL 20 MILLIGRAM(S): 2.5 TABLET ORAL at 05:40

## 2018-06-30 RX ADMIN — ESCITALOPRAM OXALATE 10 MILLIGRAM(S): 10 TABLET, FILM COATED ORAL at 11:46

## 2018-06-30 RX ADMIN — CARBIDOPA AND LEVODOPA 1 TABLET(S): 25; 100 TABLET ORAL at 05:39

## 2018-06-30 RX ADMIN — Medication 1000 UNIT(S): at 11:48

## 2018-06-30 RX ADMIN — ENOXAPARIN SODIUM 40 MILLIGRAM(S): 100 INJECTION SUBCUTANEOUS at 11:47

## 2018-06-30 RX ADMIN — CARVEDILOL PHOSPHATE 12.5 MILLIGRAM(S): 80 CAPSULE, EXTENDED RELEASE ORAL at 05:40

## 2018-07-01 RX ORDER — ALLOPURINOL 300 MG
300 TABLET ORAL DAILY
Qty: 0 | Refills: 0 | Status: DISCONTINUED | OUTPATIENT
Start: 2018-07-01 | End: 2018-07-06

## 2018-07-01 RX ORDER — COLCHICINE 0.6 MG
0.6 TABLET ORAL DAILY
Qty: 0 | Refills: 0 | Status: DISCONTINUED | OUTPATIENT
Start: 2018-07-01 | End: 2018-07-06

## 2018-07-01 RX ADMIN — CARBIDOPA AND LEVODOPA 1 TABLET(S): 25; 100 TABLET ORAL at 05:27

## 2018-07-01 RX ADMIN — LISINOPRIL 20 MILLIGRAM(S): 2.5 TABLET ORAL at 05:27

## 2018-07-01 RX ADMIN — RIVASTIGMINE 1 PATCH: 4.6 PATCH, EXTENDED RELEASE TRANSDERMAL at 12:27

## 2018-07-01 RX ADMIN — CARBIDOPA AND LEVODOPA 1 TABLET(S): 25; 100 TABLET ORAL at 12:27

## 2018-07-01 RX ADMIN — METFORMIN HYDROCHLORIDE 1000 MILLIGRAM(S): 850 TABLET ORAL at 07:39

## 2018-07-01 RX ADMIN — CARBIDOPA AND LEVODOPA 1 TABLET(S): 25; 100 TABLET ORAL at 17:11

## 2018-07-01 RX ADMIN — ENOXAPARIN SODIUM 40 MILLIGRAM(S): 100 INJECTION SUBCUTANEOUS at 12:28

## 2018-07-01 RX ADMIN — ATORVASTATIN CALCIUM 40 MILLIGRAM(S): 80 TABLET, FILM COATED ORAL at 21:07

## 2018-07-01 RX ADMIN — CARBIDOPA AND LEVODOPA 1 TABLET(S): 25; 100 TABLET ORAL at 05:32

## 2018-07-01 RX ADMIN — Medication 300 MILLIGRAM(S): at 17:59

## 2018-07-01 RX ADMIN — PANTOPRAZOLE SODIUM 40 MILLIGRAM(S): 20 TABLET, DELAYED RELEASE ORAL at 06:35

## 2018-07-01 RX ADMIN — Medication 0.6 MILLIGRAM(S): at 18:02

## 2018-07-01 RX ADMIN — Medication 81 MILLIGRAM(S): at 12:27

## 2018-07-01 RX ADMIN — ESCITALOPRAM OXALATE 10 MILLIGRAM(S): 10 TABLET, FILM COATED ORAL at 12:29

## 2018-07-01 RX ADMIN — ENTACAPONE 200 MILLIGRAM(S): 200 TABLET, FILM COATED ORAL at 05:31

## 2018-07-01 RX ADMIN — CARVEDILOL PHOSPHATE 12.5 MILLIGRAM(S): 80 CAPSULE, EXTENDED RELEASE ORAL at 05:27

## 2018-07-01 RX ADMIN — Medication 100 MILLIGRAM(S): at 21:07

## 2018-07-01 RX ADMIN — ENTACAPONE 200 MILLIGRAM(S): 200 TABLET, FILM COATED ORAL at 17:11

## 2018-07-01 RX ADMIN — CARVEDILOL PHOSPHATE 12.5 MILLIGRAM(S): 80 CAPSULE, EXTENDED RELEASE ORAL at 17:11

## 2018-07-01 RX ADMIN — ENTACAPONE 200 MILLIGRAM(S): 200 TABLET, FILM COATED ORAL at 05:28

## 2018-07-01 RX ADMIN — Medication 100 MILLIGRAM(S): at 05:27

## 2018-07-01 RX ADMIN — Medication 1000 UNIT(S): at 12:28

## 2018-07-01 RX ADMIN — SENNA PLUS 2 TABLET(S): 8.6 TABLET ORAL at 21:08

## 2018-07-01 RX ADMIN — ENTACAPONE 200 MILLIGRAM(S): 200 TABLET, FILM COATED ORAL at 12:28

## 2018-07-02 DIAGNOSIS — R42 DIZZINESS AND GIDDINESS: ICD-10-CM

## 2018-07-02 DIAGNOSIS — I10 ESSENTIAL (PRIMARY) HYPERTENSION: ICD-10-CM

## 2018-07-02 DIAGNOSIS — G20 PARKINSON'S DISEASE: ICD-10-CM

## 2018-07-02 DIAGNOSIS — Z79.82 LONG TERM (CURRENT) USE OF ASPIRIN: ICD-10-CM

## 2018-07-02 DIAGNOSIS — M25.462 EFFUSION, LEFT KNEE: ICD-10-CM

## 2018-07-02 DIAGNOSIS — M25.552 PAIN IN LEFT HIP: ICD-10-CM

## 2018-07-02 DIAGNOSIS — Z91.81 HISTORY OF FALLING: ICD-10-CM

## 2018-07-02 DIAGNOSIS — E11.9 TYPE 2 DIABETES MELLITUS WITHOUT COMPLICATIONS: ICD-10-CM

## 2018-07-02 DIAGNOSIS — R41.82 ALTERED MENTAL STATUS, UNSPECIFIED: ICD-10-CM

## 2018-07-02 DIAGNOSIS — R29.6 REPEATED FALLS: ICD-10-CM

## 2018-07-02 DIAGNOSIS — M10.062 IDIOPATHIC GOUT, LEFT KNEE: ICD-10-CM

## 2018-07-02 DIAGNOSIS — I69.351 HEMIPLEGIA AND HEMIPARESIS FOLLOWING CEREBRAL INFARCTION AFFECTING RIGHT DOMINANT SIDE: ICD-10-CM

## 2018-07-02 DIAGNOSIS — D72.829 ELEVATED WHITE BLOOD CELL COUNT, UNSPECIFIED: ICD-10-CM

## 2018-07-02 DIAGNOSIS — E78.5 HYPERLIPIDEMIA, UNSPECIFIED: ICD-10-CM

## 2018-07-02 DIAGNOSIS — Z79.84 LONG TERM (CURRENT) USE OF ORAL HYPOGLYCEMIC DRUGS: ICD-10-CM

## 2018-07-02 DIAGNOSIS — R26.81 UNSTEADINESS ON FEET: ICD-10-CM

## 2018-07-02 DIAGNOSIS — F03.90 UNSPECIFIED DEMENTIA WITHOUT BEHAVIORAL DISTURBANCE: ICD-10-CM

## 2018-07-02 LAB
24R-OH-CALCIDIOL SERPL-MCNC: 50 NG/ML — SIGNIFICANT CHANGE UP (ref 30–80)
ALBUMIN SERPL ELPH-MCNC: 3.2 G/DL — LOW (ref 3.5–5.2)
ALP SERPL-CCNC: 67 U/L — SIGNIFICANT CHANGE UP (ref 30–115)
ALT FLD-CCNC: 15 U/L — SIGNIFICANT CHANGE UP (ref 0–41)
ANION GAP SERPL CALC-SCNC: 12 MMOL/L — SIGNIFICANT CHANGE UP (ref 7–14)
AST SERPL-CCNC: 26 U/L — SIGNIFICANT CHANGE UP (ref 0–41)
BASOPHILS # BLD AUTO: 0.01 K/UL — SIGNIFICANT CHANGE UP (ref 0–0.2)
BASOPHILS NFR BLD AUTO: 0.1 % — SIGNIFICANT CHANGE UP (ref 0–1)
BILIRUB SERPL-MCNC: 0.4 MG/DL — SIGNIFICANT CHANGE UP (ref 0.2–1.2)
BUN SERPL-MCNC: 16 MG/DL — SIGNIFICANT CHANGE UP (ref 10–20)
CALCIUM SERPL-MCNC: 8.6 MG/DL — SIGNIFICANT CHANGE UP (ref 8.5–10.1)
CHLORIDE SERPL-SCNC: 105 MMOL/L — SIGNIFICANT CHANGE UP (ref 98–110)
CO2 SERPL-SCNC: 26 MMOL/L — SIGNIFICANT CHANGE UP (ref 17–32)
CREAT SERPL-MCNC: 1 MG/DL — SIGNIFICANT CHANGE UP (ref 0.7–1.5)
EOSINOPHIL # BLD AUTO: 0.17 K/UL — SIGNIFICANT CHANGE UP (ref 0–0.7)
EOSINOPHIL NFR BLD AUTO: 2.2 % — SIGNIFICANT CHANGE UP (ref 0–8)
GLUCOSE SERPL-MCNC: 112 MG/DL — HIGH (ref 70–99)
HCT VFR BLD CALC: 38.7 % — LOW (ref 42–52)
HGB BLD-MCNC: 13.1 G/DL — LOW (ref 14–18)
IMM GRANULOCYTES NFR BLD AUTO: 0.5 % — HIGH (ref 0.1–0.3)
LYMPHOCYTES # BLD AUTO: 3 K/UL — SIGNIFICANT CHANGE UP (ref 1.2–3.4)
LYMPHOCYTES # BLD AUTO: 38.5 % — SIGNIFICANT CHANGE UP (ref 20.5–51.1)
MAGNESIUM SERPL-MCNC: 1.7 MG/DL — LOW (ref 1.8–2.4)
MCHC RBC-ENTMCNC: 32.1 PG — HIGH (ref 27–31)
MCHC RBC-ENTMCNC: 33.9 G/DL — SIGNIFICANT CHANGE UP (ref 32–37)
MCV RBC AUTO: 94.9 FL — HIGH (ref 80–94)
MONOCYTES # BLD AUTO: 0.63 K/UL — HIGH (ref 0.1–0.6)
MONOCYTES NFR BLD AUTO: 8.1 % — SIGNIFICANT CHANGE UP (ref 1.7–9.3)
NEUTROPHILS # BLD AUTO: 3.95 K/UL — SIGNIFICANT CHANGE UP (ref 1.4–6.5)
NEUTROPHILS NFR BLD AUTO: 50.6 % — SIGNIFICANT CHANGE UP (ref 42.2–75.2)
NRBC # BLD: 0 /100 WBCS — SIGNIFICANT CHANGE UP (ref 0–0)
PLATELET # BLD AUTO: 193 K/UL — SIGNIFICANT CHANGE UP (ref 130–400)
POTASSIUM SERPL-MCNC: 4.5 MMOL/L — SIGNIFICANT CHANGE UP (ref 3.5–5)
POTASSIUM SERPL-SCNC: 4.5 MMOL/L — SIGNIFICANT CHANGE UP (ref 3.5–5)
PROT SERPL-MCNC: 5.3 G/DL — LOW (ref 6–8)
RBC # BLD: 4.08 M/UL — LOW (ref 4.7–6.1)
RBC # FLD: 12.2 % — SIGNIFICANT CHANGE UP (ref 11.5–14.5)
SODIUM SERPL-SCNC: 143 MMOL/L — SIGNIFICANT CHANGE UP (ref 135–146)
WBC # BLD: 7.8 K/UL — SIGNIFICANT CHANGE UP (ref 4.8–10.8)
WBC # FLD AUTO: 7.8 K/UL — SIGNIFICANT CHANGE UP (ref 4.8–10.8)

## 2018-07-02 RX ORDER — MAGNESIUM OXIDE 400 MG ORAL TABLET 241.3 MG
400 TABLET ORAL
Qty: 0 | Refills: 0 | Status: DISCONTINUED | OUTPATIENT
Start: 2018-07-02 | End: 2018-07-06

## 2018-07-02 RX ADMIN — MAGNESIUM OXIDE 400 MG ORAL TABLET 400 MILLIGRAM(S): 241.3 TABLET ORAL at 17:27

## 2018-07-02 RX ADMIN — ENTACAPONE 200 MILLIGRAM(S): 200 TABLET, FILM COATED ORAL at 17:27

## 2018-07-02 RX ADMIN — CARBIDOPA AND LEVODOPA 1 TABLET(S): 25; 100 TABLET ORAL at 17:27

## 2018-07-02 RX ADMIN — ESCITALOPRAM OXALATE 10 MILLIGRAM(S): 10 TABLET, FILM COATED ORAL at 12:57

## 2018-07-02 RX ADMIN — CARBIDOPA AND LEVODOPA 1 TABLET(S): 25; 100 TABLET ORAL at 05:51

## 2018-07-02 RX ADMIN — ENTACAPONE 200 MILLIGRAM(S): 200 TABLET, FILM COATED ORAL at 12:22

## 2018-07-02 RX ADMIN — ATORVASTATIN CALCIUM 40 MILLIGRAM(S): 80 TABLET, FILM COATED ORAL at 21:38

## 2018-07-02 RX ADMIN — CARBIDOPA AND LEVODOPA 1 TABLET(S): 25; 100 TABLET ORAL at 05:47

## 2018-07-02 RX ADMIN — Medication 300 MILLIGRAM(S): at 12:24

## 2018-07-02 RX ADMIN — RIVASTIGMINE 1 PATCH: 4.6 PATCH, EXTENDED RELEASE TRANSDERMAL at 12:22

## 2018-07-02 RX ADMIN — CARVEDILOL PHOSPHATE 12.5 MILLIGRAM(S): 80 CAPSULE, EXTENDED RELEASE ORAL at 17:28

## 2018-07-02 RX ADMIN — SENNA PLUS 2 TABLET(S): 8.6 TABLET ORAL at 21:38

## 2018-07-02 RX ADMIN — ENTACAPONE 200 MILLIGRAM(S): 200 TABLET, FILM COATED ORAL at 05:45

## 2018-07-02 RX ADMIN — Medication 1000 UNIT(S): at 12:24

## 2018-07-02 RX ADMIN — POLYETHYLENE GLYCOL 3350 17 GRAM(S): 17 POWDER, FOR SOLUTION ORAL at 17:28

## 2018-07-02 RX ADMIN — Medication 81 MILLIGRAM(S): at 12:24

## 2018-07-02 RX ADMIN — ENOXAPARIN SODIUM 40 MILLIGRAM(S): 100 INJECTION SUBCUTANEOUS at 12:21

## 2018-07-02 RX ADMIN — ENTACAPONE 200 MILLIGRAM(S): 200 TABLET, FILM COATED ORAL at 05:47

## 2018-07-02 RX ADMIN — Medication 0.6 MILLIGRAM(S): at 12:24

## 2018-07-02 RX ADMIN — CARBIDOPA AND LEVODOPA 1 TABLET(S): 25; 100 TABLET ORAL at 12:24

## 2018-07-02 RX ADMIN — METFORMIN HYDROCHLORIDE 1000 MILLIGRAM(S): 850 TABLET ORAL at 08:03

## 2018-07-02 RX ADMIN — LISINOPRIL 20 MILLIGRAM(S): 2.5 TABLET ORAL at 05:47

## 2018-07-02 RX ADMIN — CARVEDILOL PHOSPHATE 12.5 MILLIGRAM(S): 80 CAPSULE, EXTENDED RELEASE ORAL at 05:47

## 2018-07-02 RX ADMIN — Medication 100 MILLIGRAM(S): at 05:52

## 2018-07-02 RX ADMIN — PANTOPRAZOLE SODIUM 40 MILLIGRAM(S): 20 TABLET, DELAYED RELEASE ORAL at 06:14

## 2018-07-02 RX ADMIN — Medication 100 MILLIGRAM(S): at 12:57

## 2018-07-02 RX ADMIN — Medication 100 MILLIGRAM(S): at 21:38

## 2018-07-03 RX ADMIN — CARBIDOPA AND LEVODOPA 1 TABLET(S): 25; 100 TABLET ORAL at 06:13

## 2018-07-03 RX ADMIN — Medication 81 MILLIGRAM(S): at 12:00

## 2018-07-03 RX ADMIN — Medication 100 MILLIGRAM(S): at 06:13

## 2018-07-03 RX ADMIN — ENTACAPONE 200 MILLIGRAM(S): 200 TABLET, FILM COATED ORAL at 00:10

## 2018-07-03 RX ADMIN — PANTOPRAZOLE SODIUM 40 MILLIGRAM(S): 20 TABLET, DELAYED RELEASE ORAL at 06:03

## 2018-07-03 RX ADMIN — ATORVASTATIN CALCIUM 40 MILLIGRAM(S): 80 TABLET, FILM COATED ORAL at 22:45

## 2018-07-03 RX ADMIN — RIVASTIGMINE 1 PATCH: 4.6 PATCH, EXTENDED RELEASE TRANSDERMAL at 12:06

## 2018-07-03 RX ADMIN — Medication 0.6 MILLIGRAM(S): at 12:00

## 2018-07-03 RX ADMIN — CARBIDOPA AND LEVODOPA 1 TABLET(S): 25; 100 TABLET ORAL at 00:10

## 2018-07-03 RX ADMIN — ENTACAPONE 200 MILLIGRAM(S): 200 TABLET, FILM COATED ORAL at 06:13

## 2018-07-03 RX ADMIN — CARBIDOPA AND LEVODOPA 1 TABLET(S): 25; 100 TABLET ORAL at 16:50

## 2018-07-03 RX ADMIN — ENOXAPARIN SODIUM 40 MILLIGRAM(S): 100 INJECTION SUBCUTANEOUS at 12:01

## 2018-07-03 RX ADMIN — Medication 100 MILLIGRAM(S): at 12:06

## 2018-07-03 RX ADMIN — ENTACAPONE 200 MILLIGRAM(S): 200 TABLET, FILM COATED ORAL at 12:01

## 2018-07-03 RX ADMIN — CARBIDOPA AND LEVODOPA 1 TABLET(S): 25; 100 TABLET ORAL at 12:00

## 2018-07-03 RX ADMIN — ESCITALOPRAM OXALATE 10 MILLIGRAM(S): 10 TABLET, FILM COATED ORAL at 12:00

## 2018-07-03 RX ADMIN — LISINOPRIL 20 MILLIGRAM(S): 2.5 TABLET ORAL at 06:03

## 2018-07-03 RX ADMIN — METFORMIN HYDROCHLORIDE 1000 MILLIGRAM(S): 850 TABLET ORAL at 07:58

## 2018-07-03 RX ADMIN — Medication 100 MILLIGRAM(S): at 22:44

## 2018-07-03 RX ADMIN — Medication 1000 UNIT(S): at 12:00

## 2018-07-03 RX ADMIN — CARVEDILOL PHOSPHATE 12.5 MILLIGRAM(S): 80 CAPSULE, EXTENDED RELEASE ORAL at 06:13

## 2018-07-03 RX ADMIN — SENNA PLUS 2 TABLET(S): 8.6 TABLET ORAL at 22:44

## 2018-07-03 RX ADMIN — CARVEDILOL PHOSPHATE 12.5 MILLIGRAM(S): 80 CAPSULE, EXTENDED RELEASE ORAL at 16:53

## 2018-07-03 RX ADMIN — ENTACAPONE 200 MILLIGRAM(S): 200 TABLET, FILM COATED ORAL at 16:51

## 2018-07-03 RX ADMIN — MAGNESIUM OXIDE 400 MG ORAL TABLET 400 MILLIGRAM(S): 241.3 TABLET ORAL at 16:54

## 2018-07-03 RX ADMIN — Medication 300 MILLIGRAM(S): at 12:00

## 2018-07-04 RX ADMIN — LISINOPRIL 20 MILLIGRAM(S): 2.5 TABLET ORAL at 06:01

## 2018-07-04 RX ADMIN — CARVEDILOL PHOSPHATE 12.5 MILLIGRAM(S): 80 CAPSULE, EXTENDED RELEASE ORAL at 06:00

## 2018-07-04 RX ADMIN — Medication 0.6 MILLIGRAM(S): at 12:08

## 2018-07-04 RX ADMIN — ENTACAPONE 200 MILLIGRAM(S): 200 TABLET, FILM COATED ORAL at 06:01

## 2018-07-04 RX ADMIN — Medication 1000 UNIT(S): at 12:08

## 2018-07-04 RX ADMIN — ENOXAPARIN SODIUM 40 MILLIGRAM(S): 100 INJECTION SUBCUTANEOUS at 12:08

## 2018-07-04 RX ADMIN — ENTACAPONE 200 MILLIGRAM(S): 200 TABLET, FILM COATED ORAL at 00:50

## 2018-07-04 RX ADMIN — Medication 100 MILLIGRAM(S): at 12:08

## 2018-07-04 RX ADMIN — Medication 300 MILLIGRAM(S): at 12:08

## 2018-07-04 RX ADMIN — PANTOPRAZOLE SODIUM 40 MILLIGRAM(S): 20 TABLET, DELAYED RELEASE ORAL at 06:01

## 2018-07-04 RX ADMIN — MAGNESIUM OXIDE 400 MG ORAL TABLET 400 MILLIGRAM(S): 241.3 TABLET ORAL at 19:21

## 2018-07-04 RX ADMIN — ESCITALOPRAM OXALATE 10 MILLIGRAM(S): 10 TABLET, FILM COATED ORAL at 12:07

## 2018-07-04 RX ADMIN — ENTACAPONE 200 MILLIGRAM(S): 200 TABLET, FILM COATED ORAL at 12:07

## 2018-07-04 RX ADMIN — CARBIDOPA AND LEVODOPA 1 TABLET(S): 25; 100 TABLET ORAL at 19:21

## 2018-07-04 RX ADMIN — ENTACAPONE 200 MILLIGRAM(S): 200 TABLET, FILM COATED ORAL at 19:21

## 2018-07-04 RX ADMIN — CARBIDOPA AND LEVODOPA 1 TABLET(S): 25; 100 TABLET ORAL at 00:50

## 2018-07-04 RX ADMIN — RIVASTIGMINE 1 PATCH: 4.6 PATCH, EXTENDED RELEASE TRANSDERMAL at 12:08

## 2018-07-04 RX ADMIN — CARBIDOPA AND LEVODOPA 1 TABLET(S): 25; 100 TABLET ORAL at 06:00

## 2018-07-04 RX ADMIN — Medication 100 MILLIGRAM(S): at 06:00

## 2018-07-04 RX ADMIN — CARVEDILOL PHOSPHATE 12.5 MILLIGRAM(S): 80 CAPSULE, EXTENDED RELEASE ORAL at 19:21

## 2018-07-04 RX ADMIN — Medication 81 MILLIGRAM(S): at 12:08

## 2018-07-04 RX ADMIN — METFORMIN HYDROCHLORIDE 1000 MILLIGRAM(S): 850 TABLET ORAL at 07:54

## 2018-07-04 RX ADMIN — CARBIDOPA AND LEVODOPA 1 TABLET(S): 25; 100 TABLET ORAL at 12:08

## 2018-07-05 ENCOUNTER — TRANSCRIPTION ENCOUNTER (OUTPATIENT)
Age: 68
End: 2018-07-05

## 2018-07-05 LAB
ALBUMIN SERPL ELPH-MCNC: 3.5 G/DL — SIGNIFICANT CHANGE UP (ref 3.5–5.2)
ALP SERPL-CCNC: 62 U/L — SIGNIFICANT CHANGE UP (ref 30–115)
ALT FLD-CCNC: 10 U/L — SIGNIFICANT CHANGE UP (ref 0–41)
ANION GAP SERPL CALC-SCNC: 12 MMOL/L — SIGNIFICANT CHANGE UP (ref 7–14)
AST SERPL-CCNC: 21 U/L — SIGNIFICANT CHANGE UP (ref 0–41)
BASOPHILS # BLD AUTO: 0.02 K/UL — SIGNIFICANT CHANGE UP (ref 0–0.2)
BASOPHILS NFR BLD AUTO: 0.2 % — SIGNIFICANT CHANGE UP (ref 0–1)
BILIRUB SERPL-MCNC: 0.4 MG/DL — SIGNIFICANT CHANGE UP (ref 0.2–1.2)
BUN SERPL-MCNC: 13 MG/DL — SIGNIFICANT CHANGE UP (ref 10–20)
CALCIUM SERPL-MCNC: 8.7 MG/DL — SIGNIFICANT CHANGE UP (ref 8.5–10.1)
CHLORIDE SERPL-SCNC: 106 MMOL/L — SIGNIFICANT CHANGE UP (ref 98–110)
CO2 SERPL-SCNC: 25 MMOL/L — SIGNIFICANT CHANGE UP (ref 17–32)
CREAT SERPL-MCNC: 1 MG/DL — SIGNIFICANT CHANGE UP (ref 0.7–1.5)
EOSINOPHIL # BLD AUTO: 0.14 K/UL — SIGNIFICANT CHANGE UP (ref 0–0.7)
EOSINOPHIL NFR BLD AUTO: 1.6 % — SIGNIFICANT CHANGE UP (ref 0–8)
GLUCOSE SERPL-MCNC: 94 MG/DL — SIGNIFICANT CHANGE UP (ref 70–99)
HCT VFR BLD CALC: 38.9 % — LOW (ref 42–52)
HGB BLD-MCNC: 13.1 G/DL — LOW (ref 14–18)
IMM GRANULOCYTES NFR BLD AUTO: 0.6 % — HIGH (ref 0.1–0.3)
LYMPHOCYTES # BLD AUTO: 2.99 K/UL — SIGNIFICANT CHANGE UP (ref 1.2–3.4)
LYMPHOCYTES # BLD AUTO: 34.8 % — SIGNIFICANT CHANGE UP (ref 20.5–51.1)
MAGNESIUM SERPL-MCNC: 1.8 MG/DL — SIGNIFICANT CHANGE UP (ref 1.8–2.4)
MCHC RBC-ENTMCNC: 31.9 PG — HIGH (ref 27–31)
MCHC RBC-ENTMCNC: 33.7 G/DL — SIGNIFICANT CHANGE UP (ref 32–37)
MCV RBC AUTO: 94.6 FL — HIGH (ref 80–94)
MONOCYTES # BLD AUTO: 0.68 K/UL — HIGH (ref 0.1–0.6)
MONOCYTES NFR BLD AUTO: 7.9 % — SIGNIFICANT CHANGE UP (ref 1.7–9.3)
NEUTROPHILS # BLD AUTO: 4.7 K/UL — SIGNIFICANT CHANGE UP (ref 1.4–6.5)
NEUTROPHILS NFR BLD AUTO: 54.9 % — SIGNIFICANT CHANGE UP (ref 42.2–75.2)
NRBC # BLD: 0 /100 WBCS — SIGNIFICANT CHANGE UP (ref 0–0)
PLATELET # BLD AUTO: 183 K/UL — SIGNIFICANT CHANGE UP (ref 130–400)
POTASSIUM SERPL-MCNC: 4.6 MMOL/L — SIGNIFICANT CHANGE UP (ref 3.5–5)
POTASSIUM SERPL-SCNC: 4.6 MMOL/L — SIGNIFICANT CHANGE UP (ref 3.5–5)
PROT SERPL-MCNC: 5.4 G/DL — LOW (ref 6–8)
RBC # BLD: 4.11 M/UL — LOW (ref 4.7–6.1)
RBC # FLD: 12.5 % — SIGNIFICANT CHANGE UP (ref 11.5–14.5)
SODIUM SERPL-SCNC: 143 MMOL/L — SIGNIFICANT CHANGE UP (ref 135–146)
WBC # BLD: 8.58 K/UL — SIGNIFICANT CHANGE UP (ref 4.8–10.8)
WBC # FLD AUTO: 8.58 K/UL — SIGNIFICANT CHANGE UP (ref 4.8–10.8)

## 2018-07-05 RX ORDER — OXYCODONE HYDROCHLORIDE 5 MG/1
5 TABLET ORAL EVERY 4 HOURS
Qty: 0 | Refills: 0 | Status: DISCONTINUED | OUTPATIENT
Start: 2018-07-05 | End: 2018-07-06

## 2018-07-05 RX ADMIN — CARBIDOPA AND LEVODOPA 1 TABLET(S): 25; 100 TABLET ORAL at 18:23

## 2018-07-05 RX ADMIN — ESCITALOPRAM OXALATE 10 MILLIGRAM(S): 10 TABLET, FILM COATED ORAL at 12:08

## 2018-07-05 RX ADMIN — ATORVASTATIN CALCIUM 40 MILLIGRAM(S): 80 TABLET, FILM COATED ORAL at 21:20

## 2018-07-05 RX ADMIN — ENTACAPONE 200 MILLIGRAM(S): 200 TABLET, FILM COATED ORAL at 23:22

## 2018-07-05 RX ADMIN — Medication 100 MILLIGRAM(S): at 21:20

## 2018-07-05 RX ADMIN — LISINOPRIL 20 MILLIGRAM(S): 2.5 TABLET ORAL at 06:13

## 2018-07-05 RX ADMIN — Medication 100 MILLIGRAM(S): at 12:08

## 2018-07-05 RX ADMIN — METFORMIN HYDROCHLORIDE 1000 MILLIGRAM(S): 850 TABLET ORAL at 08:02

## 2018-07-05 RX ADMIN — MAGNESIUM OXIDE 400 MG ORAL TABLET 400 MILLIGRAM(S): 241.3 TABLET ORAL at 18:24

## 2018-07-05 RX ADMIN — POLYETHYLENE GLYCOL 3350 17 GRAM(S): 17 POWDER, FOR SOLUTION ORAL at 18:24

## 2018-07-05 RX ADMIN — PANTOPRAZOLE SODIUM 40 MILLIGRAM(S): 20 TABLET, DELAYED RELEASE ORAL at 08:02

## 2018-07-05 RX ADMIN — CARVEDILOL PHOSPHATE 12.5 MILLIGRAM(S): 80 CAPSULE, EXTENDED RELEASE ORAL at 18:23

## 2018-07-05 RX ADMIN — Medication 100 MILLIGRAM(S): at 06:13

## 2018-07-05 RX ADMIN — Medication 1000 UNIT(S): at 12:06

## 2018-07-05 RX ADMIN — ENOXAPARIN SODIUM 40 MILLIGRAM(S): 100 INJECTION SUBCUTANEOUS at 12:08

## 2018-07-05 RX ADMIN — CARBIDOPA AND LEVODOPA 1 TABLET(S): 25; 100 TABLET ORAL at 06:13

## 2018-07-05 RX ADMIN — Medication 0.6 MILLIGRAM(S): at 12:06

## 2018-07-05 RX ADMIN — ENTACAPONE 200 MILLIGRAM(S): 200 TABLET, FILM COATED ORAL at 06:13

## 2018-07-05 RX ADMIN — Medication 300 MILLIGRAM(S): at 12:06

## 2018-07-05 RX ADMIN — Medication 81 MILLIGRAM(S): at 12:06

## 2018-07-05 RX ADMIN — ENTACAPONE 200 MILLIGRAM(S): 200 TABLET, FILM COATED ORAL at 18:24

## 2018-07-05 RX ADMIN — CARBIDOPA AND LEVODOPA 1 TABLET(S): 25; 100 TABLET ORAL at 12:08

## 2018-07-05 RX ADMIN — SENNA PLUS 2 TABLET(S): 8.6 TABLET ORAL at 21:20

## 2018-07-05 RX ADMIN — RIVASTIGMINE 1 PATCH: 4.6 PATCH, EXTENDED RELEASE TRANSDERMAL at 12:06

## 2018-07-05 RX ADMIN — CARVEDILOL PHOSPHATE 12.5 MILLIGRAM(S): 80 CAPSULE, EXTENDED RELEASE ORAL at 06:14

## 2018-07-05 RX ADMIN — ENTACAPONE 200 MILLIGRAM(S): 200 TABLET, FILM COATED ORAL at 12:08

## 2018-07-05 RX ADMIN — CARBIDOPA AND LEVODOPA 1 TABLET(S): 25; 100 TABLET ORAL at 23:22

## 2018-07-05 NOTE — DISCHARGE NOTE ADULT - INSTRUCTIONS
Diabetic heart healthy low salt Diabetic heart healthy low salt (carbohydrate controlled, low fat low cholesterol) high fiber high magnesium (high magnesium foods include fish especially salmon, low-fat dairy foods including sugar-free yogurt, green leafy vegetables, nuts); keep well-hydrated (will also help to prevent constipation)

## 2018-07-05 NOTE — DISCHARGE NOTE ADULT - REASON FOR ADMISSION
66 y/o M PMH of HTN, Parkinson, DM, CVA, gout is here s/p fall. Pt is from [place] and he was moved to [place] due to history of recurrent falls. He was seen and treated by Rheumatology for acute gout left knee, he responded well to Prednisone and is now on colchicine for 30 days, and Allopurinol was resumed as well. His blood pressure and FS glucoses are well-controlled on current regimen. He was seen by Neurology and Parkinson's meds were adjusted, and he notices an improvement. He did well in therapy and is being discharged home on July 6, 2018. He will follow up with his new PCP Dr. Steve, also follow up with Rheumatology and Neurology. He will continue therapy at out-patient facility. 68 y/o M PMH of HTN, Parkinson, DM, CVA, gout is here s/p fall. Pt is from [place] and he was moved to [place] due to history of recurrent falls. He was seen and treated by Rheumatology for acute gout left knee, usually his attacks occurred in the big toe; he responded well to Prednisone and is now on colchicine for 30 days, and Allopurinol was resumed as well. His blood pressure and FS glucoses are well-controlled on current regimen. He was seen by Neurology and Parkinson's meds were adjusted, and he notices an improvement. He did well in therapy and is being discharged home on July 6, 2018. He will follow up with his new PCP Dr. Steve, also follow up with Rheumatology and Neurology. He will continue therapy at out-patient facility. 68 y/o M PMH of HTN, Parkinson, DM, CVA, gout is here s/p fall. Pt is from [place] and he was moved to [place] due to history of recurrent falls. He was seen and treated by Rheumatology for acute gout left knee, usually his attacks occurred in the big toe; the knee was aspirated by Ortho with some relief. He responded well to Prednisone and is now on colchicine for 30 days (started 7/2/18), and Allopurinol was resumed as well. His blood pressure and FS glucoses are well-controlled on current regimen. He was seen by Neurology and Parkinson's meds were adjusted, and he notices an improvement. He did well in therapy and is being discharged home on July 6, 2018. He will follow up with his new PCP Dr. Mclean, also follow up with Rheumatology and Neurology. He will continue therapy at out-patient facility.

## 2018-07-05 NOTE — DISCHARGE NOTE ADULT - NS AS ACTIVITY OBS
Do not drive or operate machinery/Ambulate with rolling walker and supervision for your safety/Showering allowed/No Heavy lifting/straining No Heavy lifting/straining/Do not drive or operate machinery/Showering allowed/Ambulate with single axis cane and supervision for your safety

## 2018-07-05 NOTE — DISCHARGE NOTE ADULT - CARE PLAN
Principal Discharge DX:	Parkinson disease  Goal:	stable neurologic function, prevent falls and injuries  Assessment and plan of treatment:	Continue physical therapy at out-patient facility; take your medications as prescribed and follow up with your neurologist in 2 to 3 weeks, or sooner if needed.  Secondary Diagnosis:	Diabetes mellitus  Goal:	normal blood sugars, prevent complications due to diabetes  Assessment and plan of treatment:	Follow diet and take your medication as prescribed. Check your blood sugar before breakfast and dinner daily. See a podiatrist (name, address and phone number supplied) and eye doctor on a regular basis. Follow up with your PCP.  Secondary Diagnosis:	Gout  Goal:	prevent another attack, prevent damage to joints  Assessment and plan of treatment:	Continue taking your medications as prescribed and follow up with the Rheumatologist in 2 to 3 weeks, or sooner if needed.  Secondary Diagnosis:	HTN (hypertension)  Goal:	normal blood pressure, prevent complications due to high blood pressure  Assessment and plan of treatment:	Continue diet and medications, follow up with your PCP in 1 week.  Secondary Diagnosis:	CVA (cerebral vascular accident)  Goal:	prevent another stroke  Assessment and plan of treatment:	Continue heart healthy diet and medications, follow up with your PCP and neurologist.

## 2018-07-05 NOTE — DISCHARGE NOTE ADULT - ADDITIONAL INSTRUCTIONS
**Please show these discharge papers to all your doctors and caregivers**  **Follow up with your primary care provider (PCP) Dr. Wendy Mclean in 1 to 2 weeks,    with your neurologist Dr. Artis in 2 to 3 weeks, with the Rheumatologist Dr. Carpenter, in about 3 weeks,    and with the Podiatrist (foot doctor) Dr. Zimmerman in 1 to 2 weeks    **Prescriptions for all your medications were sent to your pharmacy [facility] at [address] [place] [address]; only one month supply is allowed to be prescribed by the hospital; please follow up with your PCP Dr. Nguyen, with your neurologist Dr. Artis, and with your rheumatologist Dr. Sauer, for additional prescriptions**

## 2018-07-05 NOTE — DISCHARGE NOTE ADULT - MEDICATION SUMMARY - MEDICATIONS TO TAKE
I will START or STAY ON the medications listed below when I get home from the hospital:    acetaminophen 325 mg oral tablet  -- 2 tab(s) by mouth every 6 hours, As needed, for pain or fever  -- Indication: For if needed for pain or fever    aspirin 81 mg oral tablet  -- 1 tab(s) by mouth once a day  -- Indication: For Blood-thinner to protect your brain and heart, take with food    lisinopril 20 mg oral tablet  -- 1 tab(s) by mouth once a day  -- Indication: For Lowers your blood pressure, protects your heart and kidneys    magnesium hydroxide 8% oral suspension  -- 30 milliliter(s) by mouth once a day, As needed, Constipation  -- Indication: For (milk of magnesia) take if needed for constipation    Lexapro 10 mg oral tablet  -- 1 tab(s) by mouth once a day  -- Indication: For helps to improve mood and reduce depression and anxiety    metFORMIN 1000 mg oral tablet  -- 1 tab(s) by mouth once a day with breakfast  -- Indication: For for diabetes, controls your blood sugar    colchicine 0.6 mg oral tablet  -- 1 tab(s) by mouth once a day  -- Indication: For to prevent a gout attack    allopurinol 300 mg oral tablet  -- 1 tab(s) by mouth once a day  -- Indication: For to prevent a gout attack    Lipitor 40 mg oral tablet  -- 1 tab(s) by mouth once a day (at bedtime)  -- Indication: For lowers your cholesterol, helps to prevent stroke and heart attack    carbidopa-levodopa 25 mg-100 mg oral tablet  -- 1 tab(s) by mouth 4 times a day, at 8AM, noon, 5PM and bedtime  -- Indication: For (Sinemet) for Parkinson's , take 4 times a day, take together with entacapone (Comtan)    entacapone 200 mg oral tablet  -- 1 tab(s) by mouth 4 times a day, take together with Sinemet  -- Indication: For (Comtan) for Parkinson's, take 4 times a day, take together with Sinemet (carbidopa-levodopa)    Coreg 12.5 mg oral tablet  -- 1 tab(s) by mouth 2 times a day (every 12 houra)  -- Indication: For (carvediol) lowers your blood pressure and heart rate, protects your heart    rivastigmine 4.6 mg/24 hr transdermal film, extended release  -- 1 patch by transdermal patch every 24 hours  -- Indication: For (Exelon patch) for Parkinson's    docusate sodium 100 mg oral capsule  -- 1 cap(s) by mouth 3 times a day, As Needed for constipation (stool softener)  -- Indication: For stool softener, you can take this up to 3 times a day every day, or just take if needed, for constipation    polyethylene glycol 3350 oral powder for reconstitution  -- 17 gram(s) by mouth once a day if needed for constipation, dissolve in 8 ounces of water  -- Indication: For (MiraLax), can be taken every day to prevent constipation, or just take as needed    senna oral tablet  -- 2 tab(s) by mouth once a day (at bedtime), As Needed for constipation  -- Indication: For take if needed for constipation    magnesium oxide 400 mg (241.3 mg elemental magnesium) oral tablet  -- 1 tab(s) by mouth once a day (before a meal)  -- Indication: For magnesium supplement    Vitamin D3 1000 intl units oral tablet  -- 1 tab(s) by mouth once a day  -- Indication: For vitamin D supplement

## 2018-07-05 NOTE — DISCHARGE NOTE ADULT - CARE PROVIDER_API CALL
Wendy Mclean (MD), Medicine  [address] [place] [address]  Phone: [phone]  Fax: [phone]    Linsey Carpenter (), Internal Medicine  [address] [place] [address]  Phone: [phone]  Fax: [phone]    Dar Artis), Neurology  [address] [place] [address]  Phone: [phone]  Fax: [phone]    Ángel Zimmerman (DPCHRISTY), Foot and Ankle Surgery; Podiatric Medicine and Surgery  [address] [place] [address]  Phone: [phone]  Fax: [phone]

## 2018-07-05 NOTE — DISCHARGE NOTE ADULT - PLAN OF CARE
stable neurologic function, prevent falls and injuries Continue physical therapy at out-patient facility; take your medications as prescribed and follow up with your neurologist in 2 to 3 weeks, or sooner if needed. normal blood sugars, prevent complications due to diabetes Follow diet and take your medication as prescribed. Check your blood sugar before breakfast and dinner daily. See a podiatrist (name, address and phone number supplied) and eye doctor on a regular basis. Follow up with your PCP. prevent another attack, prevent damage to joints Continue taking your medications as prescribed and follow up with the Rheumatologist in 2 to 3 weeks, or sooner if needed. normal blood pressure, prevent complications due to high blood pressure Continue diet and medications, follow up with your PCP in 1 week. prevent another stroke Continue heart healthy diet and medications, follow up with your PCP and neurologist.

## 2018-07-05 NOTE — DISCHARGE NOTE ADULT - PATIENT PORTAL LINK FT
You can access the [facility] Patient Portal, offered by [facility], by registering with the following website: [email or web address]

## 2018-07-06 RX ORDER — RIVASTIGMINE 4.6 MG/24H
1 PATCH, EXTENDED RELEASE TRANSDERMAL
Qty: 30 | Refills: 0 | OUTPATIENT
Start: 2018-07-06 | End: 2018-08-04

## 2018-07-06 RX ORDER — METFORMIN HYDROCHLORIDE 850 MG/1
1 TABLET ORAL
Qty: 0 | Refills: 0 | COMMUNITY

## 2018-07-06 RX ORDER — METFORMIN HYDROCHLORIDE 850 MG/1
1 TABLET ORAL
Qty: 30 | Refills: 0 | OUTPATIENT
Start: 2018-07-06 | End: 2018-08-04

## 2018-07-06 RX ORDER — ATORVASTATIN CALCIUM 80 MG/1
1 TABLET, FILM COATED ORAL
Qty: 0 | Refills: 0 | COMMUNITY

## 2018-07-06 RX ORDER — DOCUSATE SODIUM 100 MG
1 CAPSULE ORAL
Qty: 0 | Refills: 0 | COMMUNITY
Start: 2018-07-06

## 2018-07-06 RX ORDER — ALLOPURINOL 300 MG
1 TABLET ORAL
Qty: 0 | Refills: 0 | COMMUNITY
Start: 2018-07-06

## 2018-07-06 RX ORDER — SENNA PLUS 8.6 MG/1
2 TABLET ORAL
Qty: 0 | Refills: 0 | COMMUNITY
Start: 2018-07-06

## 2018-07-06 RX ORDER — ENTACAPONE 200 MG/1
1 TABLET, FILM COATED ORAL
Qty: 120 | Refills: 0 | OUTPATIENT
Start: 2018-07-06 | End: 2018-08-04

## 2018-07-06 RX ORDER — COLCHICINE 0.6 MG
1 TABLET ORAL
Qty: 25 | Refills: 0 | OUTPATIENT
Start: 2018-07-06 | End: 2018-07-30

## 2018-07-06 RX ORDER — ALLOPURINOL 300 MG
1 TABLET ORAL
Qty: 30 | Refills: 0 | OUTPATIENT
Start: 2018-07-06 | End: 2018-08-04

## 2018-07-06 RX ORDER — ATORVASTATIN CALCIUM 80 MG/1
1 TABLET, FILM COATED ORAL
Qty: 30 | Refills: 0 | OUTPATIENT
Start: 2018-07-06 | End: 2018-08-04

## 2018-07-06 RX ORDER — LISINOPRIL 2.5 MG/1
1 TABLET ORAL
Qty: 30 | Refills: 0 | OUTPATIENT
Start: 2018-07-06 | End: 2018-08-04

## 2018-07-06 RX ORDER — COLCHICINE 0.6 MG
1 TABLET ORAL
Qty: 0 | Refills: 0 | COMMUNITY
Start: 2018-07-06

## 2018-07-06 RX ORDER — ESCITALOPRAM OXALATE 10 MG/1
1 TABLET, FILM COATED ORAL
Qty: 0 | Refills: 0 | COMMUNITY

## 2018-07-06 RX ORDER — POLYETHYLENE GLYCOL 3350 17 G/17G
17 POWDER, FOR SOLUTION ORAL
Qty: 0 | Refills: 0 | COMMUNITY
Start: 2018-07-06

## 2018-07-06 RX ORDER — RIVASTIGMINE 4.6 MG/24H
1 PATCH, EXTENDED RELEASE TRANSDERMAL
Qty: 0 | Refills: 0 | COMMUNITY
Start: 2018-07-06

## 2018-07-06 RX ORDER — MAGNESIUM OXIDE 400 MG ORAL TABLET 241.3 MG
1 TABLET ORAL
Qty: 0 | Refills: 0 | COMMUNITY
Start: 2018-07-06

## 2018-07-06 RX ORDER — ESCITALOPRAM OXALATE 10 MG/1
1 TABLET, FILM COATED ORAL
Qty: 30 | Refills: 0 | OUTPATIENT
Start: 2018-07-06 | End: 2018-08-04

## 2018-07-06 RX ORDER — LISINOPRIL 2.5 MG/1
1 TABLET ORAL
Qty: 0 | Refills: 0 | COMMUNITY

## 2018-07-06 RX ORDER — CARBIDOPA AND LEVODOPA 25; 100 MG/1; MG/1
1 TABLET ORAL
Qty: 120 | Refills: 0 | OUTPATIENT
Start: 2018-07-06 | End: 2018-08-04

## 2018-07-06 RX ORDER — CARVEDILOL PHOSPHATE 80 MG/1
1 CAPSULE, EXTENDED RELEASE ORAL
Qty: 60 | Refills: 0 | OUTPATIENT
Start: 2018-07-06 | End: 2018-08-04

## 2018-07-06 RX ORDER — CARVEDILOL PHOSPHATE 80 MG/1
1 CAPSULE, EXTENDED RELEASE ORAL
Qty: 0 | Refills: 0 | COMMUNITY

## 2018-07-06 RX ORDER — ACETAMINOPHEN 500 MG
2 TABLET ORAL
Qty: 0 | Refills: 0 | COMMUNITY
Start: 2018-07-06

## 2018-07-06 RX ADMIN — ENOXAPARIN SODIUM 40 MILLIGRAM(S): 100 INJECTION SUBCUTANEOUS at 11:09

## 2018-07-06 RX ADMIN — LISINOPRIL 20 MILLIGRAM(S): 2.5 TABLET ORAL at 05:25

## 2018-07-06 RX ADMIN — CARVEDILOL PHOSPHATE 12.5 MILLIGRAM(S): 80 CAPSULE, EXTENDED RELEASE ORAL at 05:26

## 2018-07-06 RX ADMIN — ESCITALOPRAM OXALATE 10 MILLIGRAM(S): 10 TABLET, FILM COATED ORAL at 11:11

## 2018-07-06 RX ADMIN — METFORMIN HYDROCHLORIDE 1000 MILLIGRAM(S): 850 TABLET ORAL at 07:51

## 2018-07-06 RX ADMIN — CARBIDOPA AND LEVODOPA 1 TABLET(S): 25; 100 TABLET ORAL at 11:09

## 2018-07-06 RX ADMIN — Medication 1000 UNIT(S): at 11:09

## 2018-07-06 RX ADMIN — PANTOPRAZOLE SODIUM 40 MILLIGRAM(S): 20 TABLET, DELAYED RELEASE ORAL at 06:02

## 2018-07-06 RX ADMIN — Medication 300 MILLIGRAM(S): at 11:09

## 2018-07-06 RX ADMIN — Medication 100 MILLIGRAM(S): at 05:25

## 2018-07-06 RX ADMIN — Medication 100 MILLIGRAM(S): at 13:31

## 2018-07-06 RX ADMIN — RIVASTIGMINE 1 PATCH: 4.6 PATCH, EXTENDED RELEASE TRANSDERMAL at 11:11

## 2018-07-06 RX ADMIN — Medication 81 MILLIGRAM(S): at 11:09

## 2018-07-06 RX ADMIN — ENTACAPONE 200 MILLIGRAM(S): 200 TABLET, FILM COATED ORAL at 11:09

## 2018-07-06 RX ADMIN — ENTACAPONE 200 MILLIGRAM(S): 200 TABLET, FILM COATED ORAL at 05:25

## 2018-07-06 RX ADMIN — CARBIDOPA AND LEVODOPA 1 TABLET(S): 25; 100 TABLET ORAL at 05:26

## 2018-07-06 RX ADMIN — Medication 0.6 MILLIGRAM(S): at 11:09

## 2018-10-04 ENCOUNTER — OUTPATIENT (OUTPATIENT)
Dept: OUTPATIENT SERVICES | Facility: HOSPITAL | Age: 68
LOS: 1 days | Discharge: HOME | End: 2018-10-04

## 2018-10-04 DIAGNOSIS — I69.820 APHASIA FOLLOWING OTHER CEREBROVASCULAR DISEASE: ICD-10-CM

## 2018-10-04 DIAGNOSIS — I69.811 MEMORY DEFICIT FOLLOWING OTHER CEREBROVASCULAR DISEASE: ICD-10-CM

## 2018-10-10 ENCOUNTER — INPATIENT (INPATIENT)
Facility: HOSPITAL | Age: 68
LOS: 5 days | Discharge: NURSING, REHAB OR LONG-TERM CARE | End: 2018-10-16
Attending: INTERNAL MEDICINE | Admitting: INTERNAL MEDICINE
Payer: MEDICARE

## 2018-10-10 VITALS
HEIGHT: 66 IN | DIASTOLIC BLOOD PRESSURE: 94 MMHG | OXYGEN SATURATION: 98 % | HEART RATE: 98 BPM | WEIGHT: 179.9 LBS | SYSTOLIC BLOOD PRESSURE: 177 MMHG | TEMPERATURE: 98 F | RESPIRATION RATE: 18 BRPM

## 2018-10-10 DIAGNOSIS — R26.2 DIFFICULTY IN WALKING, NOT ELSEWHERE CLASSIFIED: ICD-10-CM

## 2018-10-10 DIAGNOSIS — Z88.2 ALLERGY STATUS TO SULFONAMIDES: ICD-10-CM

## 2018-10-10 DIAGNOSIS — Z60.2 PROBLEMS RELATED TO LIVING ALONE: ICD-10-CM

## 2018-10-10 DIAGNOSIS — R29.6 REPEATED FALLS: ICD-10-CM

## 2018-10-10 DIAGNOSIS — E11.42 TYPE 2 DIABETES MELLITUS WITH DIABETIC POLYNEUROPATHY: ICD-10-CM

## 2018-10-10 DIAGNOSIS — M19.019 PRIMARY OSTEOARTHRITIS, UNSPECIFIED SHOULDER: ICD-10-CM

## 2018-10-10 DIAGNOSIS — M1A.0620 IDIOPATHIC CHRONIC GOUT, LEFT KNEE, WITHOUT TOPHUS (TOPHI): ICD-10-CM

## 2018-10-10 DIAGNOSIS — M25.562 PAIN IN LEFT KNEE: ICD-10-CM

## 2018-10-10 DIAGNOSIS — Z79.84 LONG TERM (CURRENT) USE OF ORAL HYPOGLYCEMIC DRUGS: ICD-10-CM

## 2018-10-10 DIAGNOSIS — Z88.0 ALLERGY STATUS TO PENICILLIN: ICD-10-CM

## 2018-10-10 DIAGNOSIS — M25.462 EFFUSION, LEFT KNEE: ICD-10-CM

## 2018-10-10 DIAGNOSIS — E86.0 DEHYDRATION: ICD-10-CM

## 2018-10-10 DIAGNOSIS — I10 ESSENTIAL (PRIMARY) HYPERTENSION: ICD-10-CM

## 2018-10-10 DIAGNOSIS — Z91.81 HISTORY OF FALLING: ICD-10-CM

## 2018-10-10 DIAGNOSIS — M25.512 PAIN IN LEFT SHOULDER: ICD-10-CM

## 2018-10-10 DIAGNOSIS — G20 PARKINSON'S DISEASE: ICD-10-CM

## 2018-10-10 DIAGNOSIS — N39.0 URINARY TRACT INFECTION, SITE NOT SPECIFIED: ICD-10-CM

## 2018-10-10 DIAGNOSIS — I69.320 APHASIA FOLLOWING CEREBRAL INFARCTION: ICD-10-CM

## 2018-10-10 PROBLEM — E11.9 TYPE 2 DIABETES MELLITUS WITHOUT COMPLICATIONS: Chronic | Status: ACTIVE | Noted: 2018-06-21

## 2018-10-10 PROBLEM — I63.9 CEREBRAL INFARCTION, UNSPECIFIED: Chronic | Status: ACTIVE | Noted: 2018-06-21

## 2018-10-10 PROBLEM — M10.9 GOUT, UNSPECIFIED: Chronic | Status: ACTIVE | Noted: 2018-06-21

## 2018-10-10 LAB
ALBUMIN SERPL ELPH-MCNC: 3.7 G/DL — SIGNIFICANT CHANGE UP (ref 3.5–5.2)
ALP SERPL-CCNC: 80 U/L — SIGNIFICANT CHANGE UP (ref 30–115)
ALT FLD-CCNC: 26 U/L — SIGNIFICANT CHANGE UP (ref 0–41)
ANION GAP SERPL CALC-SCNC: 22 MMOL/L — HIGH (ref 7–14)
APPEARANCE UR: CLEAR — SIGNIFICANT CHANGE UP
AST SERPL-CCNC: 31 U/L — SIGNIFICANT CHANGE UP (ref 0–41)
BACTERIA # UR AUTO: ABNORMAL
BASE EXCESS BLDV CALC-SCNC: -2.3 MMOL/L — LOW (ref -2–2)
BASOPHILS # BLD AUTO: 0.04 K/UL — SIGNIFICANT CHANGE UP (ref 0–0.2)
BASOPHILS NFR BLD AUTO: 0.2 % — SIGNIFICANT CHANGE UP (ref 0–1)
BILIRUB SERPL-MCNC: 3 MG/DL — HIGH (ref 0.2–1.2)
BILIRUB UR-MCNC: ABNORMAL
BUN SERPL-MCNC: 16 MG/DL — SIGNIFICANT CHANGE UP (ref 10–20)
CALCIUM SERPL-MCNC: 8.7 MG/DL — SIGNIFICANT CHANGE UP (ref 8.5–10.1)
CHLORIDE SERPL-SCNC: 102 MMOL/L — SIGNIFICANT CHANGE UP (ref 98–110)
CK SERPL-CCNC: 537 U/L — HIGH (ref 0–225)
CO2 SERPL-SCNC: 19 MMOL/L — SIGNIFICANT CHANGE UP (ref 17–32)
COLOR SPEC: YELLOW — SIGNIFICANT CHANGE UP
CREAT SERPL-MCNC: 0.8 MG/DL — SIGNIFICANT CHANGE UP (ref 0.7–1.5)
DIFF PNL FLD: ABNORMAL
EOSINOPHIL # BLD AUTO: 0 K/UL — SIGNIFICANT CHANGE UP (ref 0–0.7)
EOSINOPHIL NFR BLD AUTO: 0 % — SIGNIFICANT CHANGE UP (ref 0–8)
EPI CELLS # UR: ABNORMAL /HPF
GLUCOSE SERPL-MCNC: 145 MG/DL — HIGH (ref 70–99)
GLUCOSE UR QL: NEGATIVE MG/DL — SIGNIFICANT CHANGE UP
HCO3 BLDV-SCNC: 22 MMOL/L — SIGNIFICANT CHANGE UP (ref 22–29)
HCT VFR BLD CALC: 44.5 % — SIGNIFICANT CHANGE UP (ref 42–52)
HGB BLD-MCNC: 15.4 G/DL — SIGNIFICANT CHANGE UP (ref 14–18)
HOROWITZ INDEX BLDV+IHG-RTO: 21 — SIGNIFICANT CHANGE UP
IMM GRANULOCYTES NFR BLD AUTO: 0.4 % — HIGH (ref 0.1–0.3)
KETONES UR-MCNC: 160
LACTATE BLDV-MCNC: 1.4 MMOL/L — SIGNIFICANT CHANGE UP (ref 0.5–1.6)
LACTATE SERPL-SCNC: 1.2 MMOL/L — SIGNIFICANT CHANGE UP (ref 0.5–2.2)
LEUKOCYTE ESTERASE UR-ACNC: NEGATIVE — SIGNIFICANT CHANGE UP
LIDOCAIN IGE QN: 12 U/L — SIGNIFICANT CHANGE UP (ref 7–60)
LYMPHOCYTES # BLD AUTO: 1.73 K/UL — SIGNIFICANT CHANGE UP (ref 1.2–3.4)
LYMPHOCYTES # BLD AUTO: 10.7 % — LOW (ref 20.5–51.1)
MCHC RBC-ENTMCNC: 31.7 PG — HIGH (ref 27–31)
MCHC RBC-ENTMCNC: 34.6 G/DL — SIGNIFICANT CHANGE UP (ref 32–37)
MCV RBC AUTO: 91.6 FL — SIGNIFICANT CHANGE UP (ref 80–94)
MONOCYTES # BLD AUTO: 1.59 K/UL — HIGH (ref 0.1–0.6)
MONOCYTES NFR BLD AUTO: 9.8 % — HIGH (ref 1.7–9.3)
NEUTROPHILS # BLD AUTO: 12.8 K/UL — HIGH (ref 1.4–6.5)
NEUTROPHILS NFR BLD AUTO: 78.9 % — HIGH (ref 42.2–75.2)
NITRITE UR-MCNC: NEGATIVE — SIGNIFICANT CHANGE UP
NRBC # BLD: 0 /100 WBCS — SIGNIFICANT CHANGE UP (ref 0–0)
PCO2 BLDV: 37 MMHG — LOW (ref 41–51)
PH BLDV: 7.38 — SIGNIFICANT CHANGE UP (ref 7.26–7.43)
PH UR: 5.5 — SIGNIFICANT CHANGE UP (ref 5–8)
PLATELET # BLD AUTO: 132 K/UL — SIGNIFICANT CHANGE UP (ref 130–400)
PO2 BLDV: 75 MMHG — HIGH (ref 20–40)
POTASSIUM SERPL-MCNC: 4.1 MMOL/L — SIGNIFICANT CHANGE UP (ref 3.5–5)
POTASSIUM SERPL-SCNC: 4.1 MMOL/L — SIGNIFICANT CHANGE UP (ref 3.5–5)
PROT SERPL-MCNC: 6.3 G/DL — SIGNIFICANT CHANGE UP (ref 6–8)
PROT UR-MCNC: 100 MG/DL
RBC # BLD: 4.86 M/UL — SIGNIFICANT CHANGE UP (ref 4.7–6.1)
RBC # FLD: 13 % — SIGNIFICANT CHANGE UP (ref 11.5–14.5)
RBC CASTS # UR COMP ASSIST: SIGNIFICANT CHANGE UP /HPF
SAO2 % BLDV: 94 % — SIGNIFICANT CHANGE UP
SODIUM SERPL-SCNC: 143 MMOL/L — SIGNIFICANT CHANGE UP (ref 135–146)
SP GR SPEC: >=1.03 (ref 1.01–1.03)
TROPONIN T SERPL-MCNC: <0.01 NG/ML — SIGNIFICANT CHANGE UP
UROBILINOGEN FLD QL: 0.2 MG/DL — SIGNIFICANT CHANGE UP (ref 0.2–0.2)
WBC # BLD: 16.23 K/UL — HIGH (ref 4.8–10.8)
WBC # FLD AUTO: 16.23 K/UL — HIGH (ref 4.8–10.8)
WBC UR QL: SIGNIFICANT CHANGE UP /HPF

## 2018-10-10 PROCEDURE — 93970 EXTREMITY STUDY: CPT | Mod: 26

## 2018-10-10 RX ORDER — SODIUM CHLORIDE 9 MG/ML
1000 INJECTION INTRAMUSCULAR; INTRAVENOUS; SUBCUTANEOUS ONCE
Qty: 0 | Refills: 0 | Status: COMPLETED | OUTPATIENT
Start: 2018-10-10 | End: 2018-10-10

## 2018-10-10 RX ORDER — ACETAMINOPHEN 500 MG
975 TABLET ORAL ONCE
Qty: 0 | Refills: 0 | Status: COMPLETED | OUTPATIENT
Start: 2018-10-10 | End: 2018-10-10

## 2018-10-10 RX ORDER — SODIUM CHLORIDE 9 MG/ML
1000 INJECTION, SOLUTION INTRAVENOUS
Qty: 0 | Refills: 0 | Status: DISCONTINUED | OUTPATIENT
Start: 2018-10-10 | End: 2018-10-16

## 2018-10-10 RX ORDER — DOCUSATE SODIUM 100 MG
100 CAPSULE ORAL THREE TIMES A DAY
Qty: 0 | Refills: 0 | Status: DISCONTINUED | OUTPATIENT
Start: 2018-10-10 | End: 2018-10-16

## 2018-10-10 RX ORDER — ENOXAPARIN SODIUM 100 MG/ML
40 INJECTION SUBCUTANEOUS EVERY 24 HOURS
Qty: 0 | Refills: 0 | Status: DISCONTINUED | OUTPATIENT
Start: 2018-10-10 | End: 2018-10-16

## 2018-10-10 RX ORDER — ENTACAPONE 200 MG/1
200 TABLET, FILM COATED ORAL
Qty: 0 | Refills: 0 | Status: DISCONTINUED | OUTPATIENT
Start: 2018-10-10 | End: 2018-10-16

## 2018-10-10 RX ORDER — MORPHINE SULFATE 50 MG/1
2 CAPSULE, EXTENDED RELEASE ORAL ONCE
Qty: 0 | Refills: 0 | Status: DISCONTINUED | OUTPATIENT
Start: 2018-10-10 | End: 2018-10-10

## 2018-10-10 RX ORDER — ASPIRIN/CALCIUM CARB/MAGNESIUM 324 MG
1 TABLET ORAL
Qty: 0 | Refills: 0 | COMMUNITY

## 2018-10-10 RX ORDER — ATORVASTATIN CALCIUM 80 MG/1
40 TABLET, FILM COATED ORAL AT BEDTIME
Qty: 0 | Refills: 0 | Status: DISCONTINUED | OUTPATIENT
Start: 2018-10-10 | End: 2018-10-16

## 2018-10-10 RX ORDER — CHOLECALCIFEROL (VITAMIN D3) 125 MCG
1 CAPSULE ORAL
Qty: 0 | Refills: 0 | COMMUNITY

## 2018-10-10 RX ORDER — DEXTROSE 50 % IN WATER 50 %
15 SYRINGE (ML) INTRAVENOUS ONCE
Qty: 0 | Refills: 0 | Status: DISCONTINUED | OUTPATIENT
Start: 2018-10-10 | End: 2018-10-16

## 2018-10-10 RX ORDER — INSULIN LISPRO 100/ML
VIAL (ML) SUBCUTANEOUS
Qty: 0 | Refills: 0 | Status: DISCONTINUED | OUTPATIENT
Start: 2018-10-10 | End: 2018-10-16

## 2018-10-10 RX ORDER — CARVEDILOL PHOSPHATE 80 MG/1
12.5 CAPSULE, EXTENDED RELEASE ORAL EVERY 12 HOURS
Qty: 0 | Refills: 0 | Status: DISCONTINUED | OUTPATIENT
Start: 2018-10-10 | End: 2018-10-16

## 2018-10-10 RX ORDER — CARBIDOPA AND LEVODOPA 25; 100 MG/1; MG/1
1 TABLET ORAL
Qty: 0 | Refills: 0 | Status: DISCONTINUED | OUTPATIENT
Start: 2018-10-10 | End: 2018-10-16

## 2018-10-10 RX ORDER — DEXTROSE 50 % IN WATER 50 %
25 SYRINGE (ML) INTRAVENOUS ONCE
Qty: 0 | Refills: 0 | Status: DISCONTINUED | OUTPATIENT
Start: 2018-10-10 | End: 2018-10-16

## 2018-10-10 RX ORDER — DEXTROSE 50 % IN WATER 50 %
12.5 SYRINGE (ML) INTRAVENOUS ONCE
Qty: 0 | Refills: 0 | Status: DISCONTINUED | OUTPATIENT
Start: 2018-10-10 | End: 2018-10-16

## 2018-10-10 RX ORDER — GLUCAGON INJECTION, SOLUTION 0.5 MG/.1ML
1 INJECTION, SOLUTION SUBCUTANEOUS ONCE
Qty: 0 | Refills: 0 | Status: DISCONTINUED | OUTPATIENT
Start: 2018-10-10 | End: 2018-10-16

## 2018-10-10 RX ORDER — ACETAMINOPHEN 500 MG
650 TABLET ORAL EVERY 6 HOURS
Qty: 0 | Refills: 0 | Status: DISCONTINUED | OUTPATIENT
Start: 2018-10-10 | End: 2018-10-16

## 2018-10-10 RX ORDER — LISINOPRIL 2.5 MG/1
20 TABLET ORAL DAILY
Qty: 0 | Refills: 0 | Status: DISCONTINUED | OUTPATIENT
Start: 2018-10-10 | End: 2018-10-16

## 2018-10-10 RX ORDER — SODIUM CHLORIDE 9 MG/ML
1000 INJECTION INTRAMUSCULAR; INTRAVENOUS; SUBCUTANEOUS
Qty: 0 | Refills: 0 | Status: DISCONTINUED | OUTPATIENT
Start: 2018-10-10 | End: 2018-10-13

## 2018-10-10 RX ORDER — METFORMIN HYDROCHLORIDE 850 MG/1
500 TABLET ORAL
Qty: 0 | Refills: 0 | Status: DISCONTINUED | OUTPATIENT
Start: 2018-10-10 | End: 2018-10-16

## 2018-10-10 RX ORDER — ESCITALOPRAM OXALATE 10 MG/1
10 TABLET, FILM COATED ORAL DAILY
Qty: 0 | Refills: 0 | Status: DISCONTINUED | OUTPATIENT
Start: 2018-10-10 | End: 2018-10-16

## 2018-10-10 RX ADMIN — ENOXAPARIN SODIUM 40 MILLIGRAM(S): 100 INJECTION SUBCUTANEOUS at 21:18

## 2018-10-10 RX ADMIN — MORPHINE SULFATE 2 MILLIGRAM(S): 50 CAPSULE, EXTENDED RELEASE ORAL at 14:41

## 2018-10-10 RX ADMIN — SODIUM CHLORIDE 50 MILLILITER(S): 9 INJECTION INTRAMUSCULAR; INTRAVENOUS; SUBCUTANEOUS at 21:17

## 2018-10-10 RX ADMIN — ATORVASTATIN CALCIUM 40 MILLIGRAM(S): 80 TABLET, FILM COATED ORAL at 21:17

## 2018-10-10 RX ADMIN — Medication 975 MILLIGRAM(S): at 14:40

## 2018-10-10 RX ADMIN — SODIUM CHLORIDE 1000 MILLILITER(S): 9 INJECTION INTRAMUSCULAR; INTRAVENOUS; SUBCUTANEOUS at 12:03

## 2018-10-10 SDOH — SOCIAL STABILITY - SOCIAL INSECURITY: PROBLEMS RELATED TO LIVING ALONE: Z60.2

## 2018-10-10 NOTE — H&P ADULT - ASSESSMENT
Assessment and Plan:    1. s/p Fall:  Patient found down but unable to determine circumstances surrounding the fall.   Trauma work up negative for any fractures.  PT evaluation.      2. h/o CVA:  Continue Aspirin and Statin.      3. h/o Gout, left knee swelling:  Continue Colchicine and prednisone.  WBAT LLE, ice, elevation PRN  Pain control with Tylenol.      4. Parkinson's:  Continue Home medications.  Neurology evaluation for Ambulatory difficulty.      5. NIDDM:  Hemoglobin A1C, Whole Blood: 5.6 % (06.24.18 @ 05:50)  Continue Metformin.  Monitor FS.      DVT prophylaxis: Heparin Patient had apparently sustained a fall on Monday night and was unable to able to get up thus remained on the floor until he was found a few minutes PTP. Patient on presentation complained of left shoulder and left knee pain. Much history could not be obtained due to h/o CVA with expressive aphasia and it is unclear if there was any LOC or head injury. At baseline he ambulates with walker but has been having recurrent falls. His last admission was on 7/5/18 when he had presented to the [facility] with similar complaints and treated for Acute Gouty Arthritis left knee and Parkinson's medications adjusted given the possibility of being the cause of the recurrent falls. Trauma work up was negative but x-ray showed widening of the acromioclavicular joint to 1.3 cm, which may represent instability and Left knee suprapatellar effusion. He was admitted for further evaluation.    Assessment and Plan:    1. s/p Fall:  Patient found down but unable to determine circumstances surrounding the fall.   Trauma work up negative for any fractures.  PT evaluation.      2. h/o CVA:  Continue Aspirin and Statin.      3. h/o Gout, left knee swelling:  Continue Colchicine and prednisone.  WBAT LLE, ice, elevation PRN  Pain control with Tylenol.      4. Parkinson's:  Continue Home medications.  Neurology evaluation for Ambulatory difficulty.      5. NIDDM:  Hemoglobin A1C, Whole Blood: 5.6 % (06.24.18 @ 05:50)  Continue Metformin.  Monitor FS.      DVT prophylaxis: Heparin

## 2018-10-10 NOTE — ED PROVIDER NOTE - NS ED ROS FT
Review of Systems    Constitutional: (-) fever/ chills (-) weight loss  Eyes/ENT: (-) blurry vision, (-) epistaxis (-) sore throat (-) ear pain  Cardiovascular: (-) chest pain, (-) syncope (-) palpitations  Respiratory: (-) cough, (-) shortness of breath  Gastrointestinal: (-) vomiting, (-) diarrhea (-) abdominal pain  Musculoskeletal: (-) neck pain, (-) back pain, (-) joint pain (-) pedal edema, (+) L knee pain, (+) L shoulder pain  Integumentary: (-) rash, (-) swelling  Neurological: (-) headache, (-) altered mental status, (+) expressive aphasia  Psychiatric: (-) hallucinations or depression   Allergic/Immunologic: (-) pruritus

## 2018-10-10 NOTE — ED PROVIDER NOTE - CONSTITUTIONAL, MLM
normal... Well appearing, well nourished, awake, alert, oriented to person, place, time/situation and in no apparent distress. Expressive aphasia

## 2018-10-10 NOTE — H&P ADULT - NSHPPHYSICALEXAM_GEN_ALL_CORE
VITALS:  T(F): 99.6 (10-10-18 @ 14:42), Max: 99.6 (10-10-18 @ 11:45)  HR: 100 (10-10-18 @ 17:33) (86 - 114)  BP: 144/76 (10-10-18 @ 17:33) (143/84 - 189/105)  RR: 18 (10-10-18 @ 17:33) (18 - 20)  SpO2: 99% (10-10-18 @ 17:33) (96% - 99%)    PHYSICAL EXAM:  GENERAL: NAD  HEAD:  Atraumatic, Normocephalic  EYES: EOMI, PERRLA, conjunctiva and sclera clear  ENMT: Moist mucous membranes  NECK: Supple, Normal thyroid  NERVOUS SYSTEM:  Awake & Alert. Motor Strength 5/5 B/L upper and 3/5 lower extremities expressive aphasia.  CHEST/LUNG: Clear to ausculation bilaterally; No rales, rhonchi, wheezing, or rubs  HEART: Regular rate and rhythm; No murmurs, rubs, or gallops  ABDOMEN: Soft, Nontender, Nondistended; Bowel sounds present  EXTREMITIES:  2+ Peripheral Pulses, No clubbing, cyanosis, swollen left knee.

## 2018-10-10 NOTE — ED PROVIDER NOTE - OBJECTIVE STATEMENT
67 y/o M presented ED status post fall. Pt found on floor by friend. Pt states he has been on the floor since Monday night and was not able to get up. Pt presented with left shoulder and left knee pain. Pt has a h/o CVA with expressive aphasia since stroke. Pt normally ambulate with walker

## 2018-10-10 NOTE — H&P ADULT - HISTORY OF PRESENT ILLNESS
67 y/o M presented ED status post fall. Pt found on floor by friend. Pt states he has been on the floor since Monday night and was not able to get up. Pt presented with left shoulder and left knee pain. Pt has a h/o CVA with expressive aphasia since stroke. Pt normally ambulate with walker.    66 y/o M PMH of HTN, Parkinson, DM, CVA, gout is here s/p fall. History was obtained from relative/caregiver- Ms Caroline Alegre.   She states she saw him yesterday, when he complained of pain in his Lt knee, but his knee was not swollen. Later this morning, pt was not answering phone calls, so a friend went to check on him and found him on the floor and he was naked. Unknown if he lost consciousness/ hit head. He complained of pain in the Lt knee and it was swollen and he was brought to the ED. No c/o recent fever, trauma to the knee. No CP, SOB, Dizziness. Also pt at baseline has cognitive impairment, but he can hold a conversion and knows where he is, but at this time he only knows his name and he is very confused, which is not his baseline.  Pt is from [place] and he was moved to [place] due to history of recurrent falls. Unsure why he is falling, but caregiver states he has problem with balance and sometimes refuses to use a walker. Also he is being evaluated by a cardiologist as outpt for recurrent dizziness and falls. His last gout attack was a year ago.   This is a 67y Male who presents to the ED today with altered mental status; per patient's partner he fell earlier this morning, and had been having left knee pain for past day prior to fall. Per family member patient states he is unable to walk now. Unclear if lost consciousness or hit head. Denies fevers/chills. Reports episodes of gout to knee similar in past.     66 y/o M PMH of HTN, Parkinson, DM, CVA, gout is here s/p fall. Pt is from [place] and he was moved to [place] due to history of recurrent falls. He was seen and treated by Rheumatology for acute gout left knee, usually his attacks occurred in the big toe; the knee was aspirated by Ortho with some relief. He responded well to Prednisone and is now on colchicine for 30 days (started 7/2/18), and Allopurinol was resumed as well. His blood pressure and FS glucoses are well-controlled on current regimen. He was seen by Neurology and Parkinson's meds were adjusted, and he notices an improvement. He did well in therapy and is being discharged home on July 6, 2018. He will follow up with his new PCP Dr. Mclean, also follow up with Rheumatology and Neurology. He will continue therapy at out-patient facility. Patient is a 67 y/o Male with PMH of HTN, Parkinson, DM, CVA, gout, recurrent falls. He was brought in by caregiver with a history of being found down. Patient had apparently sustained a fall on Monday night and was unable to able to get up thus remained on the floor until he was found a few minutes PTP. Patient on presentation complained of left shoulder and left knee pain. Much history could not be obtained due to h/o CVA with expressive aphasia and it is unclear if there was any LOC or head injury. At baseline he ambulates with walker but has been having recurrent falls. His last admission was on 7/5/18 when he had presented to the [facility] with similar complaints and treated for Acute Gouty Arthritis left knee and Parkinson's medications adjusted given the possibility of being the cause of the recurrent falls. Trauma work up was negative but x-ray showed widening of the acromioclavicular joint to 1.3 cm, which may represent instability and Left knee suprapatellar effusion. He was admitted for further evaluation.    Relative/caregiver- Ms Caroline Alegre.

## 2018-10-10 NOTE — ED ADULT TRIAGE NOTE - CHIEF COMPLAINT QUOTE
As per EMS, patient fell yesterday in his home. Patient denies LOC or head trauma, as per home health aide patient was on floor for 24hrs. Complaining of left knee pain.

## 2018-10-10 NOTE — H&P ADULT - NSHPLABSRESULTS_GEN_ALL_CORE
15.4   16.23 )-----------( 132      ( 10 Oct 2018 11:50 )             44.5     10-10    143  |  102  |  16  ----------------------------<  145<H>  4.1   |  19  |  0.8    Ca    8.7      10 Oct 2018 11:50    TPro  6.3  /  Alb  3.7  /  TBili  3.0<H>  /  DBili  x   /  AST  31  /  ALT  26  /  AlkPhos  80  10-10      CARDIAC MARKERS ( 10 Oct 2018 11:50 )  x     / <0.01 ng/mL / 537 U/L / x     / x            X-ray Shoulder 2 Views, Left (10.10.18 @ 13:50)     Widening of the acromioclavicular joint to 1.3 cm, which may represent   instability. Weightbearing views may provide further information    No acute displaced fracture    There are degenerative changes of left shoulder        CT Cervical Spine No Cont (10.10.18 @ 12:36)     No evidence of acute cervical spine injury.    Degenerative changes as above.        CT Head No Cont (10.10.18 @ 12:33)     No mass effect or intracranial hemorrhage.     Chronic microvascular ischemic changes. Chronic lacunar infarction in the   left basal ganglia.        X-ray Pelvis 3 Views (10.10.18 @ 13:50)     No acute displaced fracture or dislocation.     Degenerative change of the hips and lower lumbar spine.        X-ray Knee 3 Views, Left (10.10.18 @ 13:50)     Large supra patellar joint effusion.    No acute displaced fracture or dislocation.    Mild degenerative change. Vascular calcifications.          X-ray Chest 1 View- PORTABLE-Routine (10.10.18 @ 13:50)     Support devices: Telemetry leads overlie patient    Cardiac/mediastinum/hilum: Unremarkable.    Lung parenchyma/Pleura: Within normal limits.    Skeleton/soft tissues: Degenerative change. Status post rotator cuff   repair of the right shoulder with hardware present    Impression:      No radiographic evidence of acute cardiopulmonary disease.

## 2018-10-10 NOTE — ED PROVIDER NOTE - ATTENDING CONTRIBUTION TO CARE
67 yo M h/p Parkinson disease, CVA, Gout, HTN, DM presents from home via EMS accompained by a care giver/friend presents with c/o fall.  Pt c/o left knee pain, found on floor by his bed by friend.  Pt thinks he may have fallen Monday night,   no CP, no SOB.  On exam pt in NAD AA O x 3, no no signs of head trauma, PERRL, dry MM and lips, Lungs CAT B/L, abd soft nt nd, + pain with ROM to left shoulder, + tender left knee with pain with ROM, no skin changes, no warmth, no edema, good

## 2018-10-11 LAB
ANION GAP SERPL CALC-SCNC: 18 MMOL/L — HIGH (ref 7–14)
BASOPHILS # BLD AUTO: 0.01 K/UL — SIGNIFICANT CHANGE UP (ref 0–0.2)
BASOPHILS NFR BLD AUTO: 0.1 % — SIGNIFICANT CHANGE UP (ref 0–1)
BUN SERPL-MCNC: 15 MG/DL — SIGNIFICANT CHANGE UP (ref 10–20)
CALCIUM SERPL-MCNC: 8.1 MG/DL — LOW (ref 8.5–10.1)
CHLORIDE SERPL-SCNC: 105 MMOL/L — SIGNIFICANT CHANGE UP (ref 98–110)
CO2 SERPL-SCNC: 22 MMOL/L — SIGNIFICANT CHANGE UP (ref 17–32)
CREAT SERPL-MCNC: 0.8 MG/DL — SIGNIFICANT CHANGE UP (ref 0.7–1.5)
CRP SERPL-MCNC: 6.71 MG/DL — HIGH (ref 0–0.4)
CULTURE RESULTS: NO GROWTH — SIGNIFICANT CHANGE UP
EOSINOPHIL # BLD AUTO: 0.08 K/UL — SIGNIFICANT CHANGE UP (ref 0–0.7)
EOSINOPHIL NFR BLD AUTO: 0.6 % — SIGNIFICANT CHANGE UP (ref 0–8)
ERYTHROCYTE [SEDIMENTATION RATE] IN BLOOD: 23 MM/HR — HIGH (ref 0–10)
ESTIMATED AVERAGE GLUCOSE: 137 MG/DL — HIGH (ref 68–114)
GLUCOSE BLDC GLUCOMTR-MCNC: 111 MG/DL — HIGH (ref 70–99)
GLUCOSE BLDC GLUCOMTR-MCNC: 198 MG/DL — HIGH (ref 70–99)
GLUCOSE BLDC GLUCOMTR-MCNC: 272 MG/DL — HIGH (ref 70–99)
GLUCOSE BLDC GLUCOMTR-MCNC: 96 MG/DL — SIGNIFICANT CHANGE UP (ref 70–99)
GLUCOSE SERPL-MCNC: 82 MG/DL — SIGNIFICANT CHANGE UP (ref 70–99)
HBA1C BLD-MCNC: 6.4 % — HIGH (ref 4–5.6)
HCT VFR BLD CALC: 41.6 % — LOW (ref 42–52)
HCV AB S/CO SERPL IA: 0.09 S/CO — SIGNIFICANT CHANGE UP
HCV AB SERPL-IMP: SIGNIFICANT CHANGE UP
HGB BLD-MCNC: 14.2 G/DL — SIGNIFICANT CHANGE UP (ref 14–18)
IMM GRANULOCYTES NFR BLD AUTO: 0.4 % — HIGH (ref 0.1–0.3)
LYMPHOCYTES # BLD AUTO: 15.9 % — LOW (ref 20.5–51.1)
LYMPHOCYTES # BLD AUTO: 2.13 K/UL — SIGNIFICANT CHANGE UP (ref 1.2–3.4)
MAGNESIUM SERPL-MCNC: 1.7 MG/DL — LOW (ref 1.8–2.4)
MCHC RBC-ENTMCNC: 32.2 PG — HIGH (ref 27–31)
MCHC RBC-ENTMCNC: 34.1 G/DL — SIGNIFICANT CHANGE UP (ref 32–37)
MCV RBC AUTO: 94.3 FL — HIGH (ref 80–94)
MONOCYTES # BLD AUTO: 1.3 K/UL — HIGH (ref 0.1–0.6)
MONOCYTES NFR BLD AUTO: 9.7 % — HIGH (ref 1.7–9.3)
NEUTROPHILS # BLD AUTO: 9.8 K/UL — HIGH (ref 1.4–6.5)
NEUTROPHILS NFR BLD AUTO: 73.3 % — SIGNIFICANT CHANGE UP (ref 42.2–75.2)
NRBC # BLD: 0 /100 WBCS — SIGNIFICANT CHANGE UP (ref 0–0)
PHOSPHATE SERPL-MCNC: 2.4 MG/DL — SIGNIFICANT CHANGE UP (ref 2.1–4.9)
PLATELET # BLD AUTO: 161 K/UL — SIGNIFICANT CHANGE UP (ref 130–400)
POTASSIUM SERPL-MCNC: 4 MMOL/L — SIGNIFICANT CHANGE UP (ref 3.5–5)
POTASSIUM SERPL-SCNC: 4 MMOL/L — SIGNIFICANT CHANGE UP (ref 3.5–5)
RBC # BLD: 4.41 M/UL — LOW (ref 4.7–6.1)
RBC # FLD: 13.2 % — SIGNIFICANT CHANGE UP (ref 11.5–14.5)
SODIUM SERPL-SCNC: 145 MMOL/L — SIGNIFICANT CHANGE UP (ref 135–146)
SPECIMEN SOURCE: SIGNIFICANT CHANGE UP
WBC # BLD: 13.38 K/UL — HIGH (ref 4.8–10.8)
WBC # FLD AUTO: 13.38 K/UL — HIGH (ref 4.8–10.8)

## 2018-10-11 RX ORDER — OXYCODONE AND ACETAMINOPHEN 5; 325 MG/1; MG/1
1 TABLET ORAL EVERY 6 HOURS
Qty: 0 | Refills: 0 | Status: DISCONTINUED | OUTPATIENT
Start: 2018-10-11 | End: 2018-10-16

## 2018-10-11 RX ORDER — MORPHINE SULFATE 50 MG/1
4 CAPSULE, EXTENDED RELEASE ORAL ONCE
Qty: 0 | Refills: 0 | Status: DISCONTINUED | OUTPATIENT
Start: 2018-10-11 | End: 2018-10-16

## 2018-10-11 RX ORDER — MAGNESIUM OXIDE 400 MG ORAL TABLET 241.3 MG
400 TABLET ORAL
Qty: 0 | Refills: 0 | Status: DISCONTINUED | OUTPATIENT
Start: 2018-10-11 | End: 2018-10-16

## 2018-10-11 RX ADMIN — CARVEDILOL PHOSPHATE 12.5 MILLIGRAM(S): 80 CAPSULE, EXTENDED RELEASE ORAL at 06:38

## 2018-10-11 RX ADMIN — Medication 40 MILLIGRAM(S): at 06:38

## 2018-10-11 RX ADMIN — CARBIDOPA AND LEVODOPA 1 TABLET(S): 25; 100 TABLET ORAL at 23:29

## 2018-10-11 RX ADMIN — METFORMIN HYDROCHLORIDE 500 MILLIGRAM(S): 850 TABLET ORAL at 17:28

## 2018-10-11 RX ADMIN — ESCITALOPRAM OXALATE 10 MILLIGRAM(S): 10 TABLET, FILM COATED ORAL at 11:30

## 2018-10-11 RX ADMIN — ATORVASTATIN CALCIUM 40 MILLIGRAM(S): 80 TABLET, FILM COATED ORAL at 21:35

## 2018-10-11 RX ADMIN — ENTACAPONE 200 MILLIGRAM(S): 200 TABLET, FILM COATED ORAL at 00:15

## 2018-10-11 RX ADMIN — CARBIDOPA AND LEVODOPA 1 TABLET(S): 25; 100 TABLET ORAL at 11:30

## 2018-10-11 RX ADMIN — LISINOPRIL 20 MILLIGRAM(S): 2.5 TABLET ORAL at 06:38

## 2018-10-11 RX ADMIN — ENOXAPARIN SODIUM 40 MILLIGRAM(S): 100 INJECTION SUBCUTANEOUS at 21:35

## 2018-10-11 RX ADMIN — CARBIDOPA AND LEVODOPA 1 TABLET(S): 25; 100 TABLET ORAL at 00:15

## 2018-10-11 RX ADMIN — ENTACAPONE 200 MILLIGRAM(S): 200 TABLET, FILM COATED ORAL at 11:30

## 2018-10-11 RX ADMIN — ENTACAPONE 200 MILLIGRAM(S): 200 TABLET, FILM COATED ORAL at 06:38

## 2018-10-11 RX ADMIN — OXYCODONE AND ACETAMINOPHEN 1 TABLET(S): 5; 325 TABLET ORAL at 17:28

## 2018-10-11 RX ADMIN — ENTACAPONE 200 MILLIGRAM(S): 200 TABLET, FILM COATED ORAL at 23:28

## 2018-10-11 RX ADMIN — CARBIDOPA AND LEVODOPA 1 TABLET(S): 25; 100 TABLET ORAL at 06:38

## 2018-10-11 RX ADMIN — METFORMIN HYDROCHLORIDE 500 MILLIGRAM(S): 850 TABLET ORAL at 06:38

## 2018-10-11 RX ADMIN — Medication 3: at 17:26

## 2018-10-11 RX ADMIN — ENTACAPONE 200 MILLIGRAM(S): 200 TABLET, FILM COATED ORAL at 17:28

## 2018-10-11 RX ADMIN — CARBIDOPA AND LEVODOPA 1 TABLET(S): 25; 100 TABLET ORAL at 17:28

## 2018-10-11 RX ADMIN — SODIUM CHLORIDE 50 MILLILITER(S): 9 INJECTION INTRAMUSCULAR; INTRAVENOUS; SUBCUTANEOUS at 21:35

## 2018-10-11 RX ADMIN — CARVEDILOL PHOSPHATE 12.5 MILLIGRAM(S): 80 CAPSULE, EXTENDED RELEASE ORAL at 17:28

## 2018-10-11 RX ADMIN — OXYCODONE AND ACETAMINOPHEN 1 TABLET(S): 5; 325 TABLET ORAL at 16:15

## 2018-10-11 NOTE — PROGRESS NOTE ADULT - SUBJECTIVE AND OBJECTIVE BOX
ZAKIA MILLS  68y  Male    Patient is a 68y old  Male who presents with a chief complaint of AMS and s/p Fall       INTERVAL HPI/OVERNIGHT EVENTS:  Patient doing much better today. No interval events. Able to communicate better and giving details surrounding the fall.      REVIEW OF SYSTEMS:  CONSTITUTIONAL: No fever, weight loss, or fatigue  EYES: No eye pain, visual disturbances, or discharge  ENMT:  No difficulty hearing, tinnitus, vertigo; No sinus or throat pain  NECK: No pain or stiffness  RESPIRATORY: No cough, wheezing, chills or hemoptysis; No shortness of breath  CARDIOVASCULAR: No chest pain, palpitations, dizziness, or leg swelling  GASTROINTESTINAL: No abdominal or epigastric pain. No nausea, vomiting, or hematemesis; No diarrhea or constipation. No melena or hematochezia.  GENITOURINARY: No dysuria, frequency, hematuria, or incontinence  NEUROLOGICAL: No headaches, memory loss, loss of strength, numbness, or tremors  SKIN: No itching, burning, rashes, or lesions   LYMPH NODES: No enlarged glands  ENDOCRINE: No heat or cold intolerance; No hair loss  MUSCULOSKELETAL: left knee pain and swelling. Left shoulder pain.   PSYCHIATRIC: No depression, anxiety, mood swings, or difficulty sleeping  HEME/LYMPH: No easy bruising, or bleeding gums  ALLERGY AND IMMUNOLOGIC: No hives or eczema      VITALS:  T(F): 96.3 (10-11-18 @ 21:25), Max: 98.2 (10-11-18 @ 06:02)  HR: 82 (10-11-18 @ 21:25) (82 - 100)  BP: 134/81 (10-11-18 @ 21:25) (122/74 - 167/88)  RR: 16 (10-11-18 @ 21:25) (16 - 16)  SpO2: --      CAPILLARY BLOOD GLUCOSE  POCT Blood Glucose.: 198 mg/dL (11 Oct 2018 21:32)  POCT Blood Glucose.: 272 mg/dL (11 Oct 2018 16:27)  POCT Blood Glucose.: 111 mg/dL (11 Oct 2018 11:13)  POCT Blood Glucose.: 96 mg/dL (11 Oct 2018 07:20)      PHYSICAL EXAM:  GENERAL: NAD  HEAD:  Atraumatic, Normocephalic  EYES: conjunctiva and sclera clear  ENMT: Moist mucous membranes  NECK: Supple, Normal thyroid  NERVOUS SYSTEM:  Alert & Oriented X 3, Decreased strength in the LE with decreased ROM in the left knee due to pain.   CHEST/LUNG: Clear to auscultation bilaterally; No rales, rhonchi, wheezing, or rubs  HEART: Regular rate and rhythm; No murmurs, rubs, or gallops  ABDOMEN: Soft, Nontender, Nondistended; Bowel sounds present  EXTREMITIES:  2+ Peripheral Pulses, No clubbing, cyanosis. Left knee effusion, tender. No erythema.  LYMPH: No lymphadenopathy noted  SKIN: Please see nursing notes.    Consultant(s) Notes Reviewed:  [x ] YES  [ ] NO  Care Discussed with Consultants/Other Providers [ x] YES  [ ] NO    LABS:                        14.2   13.38 )-----------( 161      ( 11 Oct 2018 08:48 )             41.6     10-11    145  |  105  |  15  ----------------------------<  82  4.0   |  22  |  0.8    Ca    8.1<L>      11 Oct 2018 08:48  Phos  2.4     10-11  Mg     1.7     10-11    TPro  6.3  /  Alb  3.7  /  TBili  3.0<H>  /  DBili  x   /  AST  31  /  ALT  26  /  AlkPhos  80  10-10      Sedimentation Rate, Erythrocyte: 23 mm/Hr (10.11.18 @ 08:48)      Urinalysis Basic - ( 10 Oct 2018 11:49 )    Color: Yellow / Appearance: Clear / SG: >=1.030 / pH: x  Gluc: x / Ketone: 160  / Bili: Small / Urobili: 0.2 mg/dL   Blood: x / Protein: 100 mg/dL / Nitrite: Negative   Leuk Esterase: Negative / RBC: 1-2 /HPF / WBC 1-2 /HPF   Sq Epi: x / Non Sq Epi: Few /HPF / Bacteria: Few      MICROBIOLOGY:  Culture - Urine (10.10.18 @ 11:49)    Specimen Source: .Urine Catheterized    Culture Results: No growth        RADIOLOGY & ADDITIONAL TESTS:  X-ray Shoulder 2 Views, Left (10.10.18 @ 13:50)   	Widening of the acromioclavicular joint to 1.3 cm, which may represent   	instability. Weightbearing views may provide further information    	No acute displaced fracture    	There are degenerative changes of left shoulder      CT Cervical Spine No Cont (10.10.18 @ 12:36)   	No evidence of acute cervical spine injury.    	Degenerative changes as above.      CT Head No Cont (10.10.18 @ 12:33)   	No mass effect or intracranial hemorrhage.     	Chronic microvascular ischemic changes. Chronic lacunar infarction in the   	left basal ganglia.        X-ray Pelvis 3 Views (10.10.18 @ 13:50)   	No acute displaced fracture or dislocation.     	Degenerative change of the hips and lower lumbar spine.        X-ray Knee 3 Views, Left (10.10.18 @ 13:50)   	Large supra patellar joint effusion.    	No acute displaced fracture or dislocation.    	Mild degenerative change. Vascular calcifications.          X-ray Chest 1 View- PORTABLE-Routine (10.10.18 @ 13:50)   	Support devices: Telemetry leads overlie patient    	Cardiac/mediastinum/hilum: Unremarkable.    	Lung parenchyma/Pleura: Within normal limits.    	Skeleton/soft tissues: Degenerative change. Status post rotator cuff   	repair of the right shoulder with hardware present    	Impression:      	No radiographic evidence of acute cardiopulmonary disease.    Imaging Personally Reviewed:  [x] YES  [ ] NO    MEDICATIONS  (STANDING):  atorvastatin 40 milliGRAM(s) Oral at bedtime  carbidopa/levodopa  25/100 1 Tablet(s) Oral four times a day  carvedilol 12.5 milliGRAM(s) Oral every 12 hours  dextrose 5%. 1000 milliLiter(s) (50 mL/Hr) IV Continuous <Continuous>  dextrose 50% Injectable 12.5 Gram(s) IV Push once  dextrose 50% Injectable 25 Gram(s) IV Push once  dextrose 50% Injectable 25 Gram(s) IV Push once  enoxaparin Injectable 40 milliGRAM(s) SubCutaneous every 24 hours  entacapone 200 milliGRAM(s) Oral four times a day  escitalopram 10 milliGRAM(s) Oral daily  insulin lispro (HumaLOG) corrective regimen sliding scale   SubCutaneous three times a day before meals  lisinopril 20 milliGRAM(s) Oral daily  metFORMIN 500 milliGRAM(s) Oral two times a day  morphine  - Injectable 4 milliGRAM(s) IV Push once  predniSONE   Tablet 40 milliGRAM(s) Oral daily  sodium chloride 0.9%. 1000 milliLiter(s) (50 mL/Hr) IV Continuous <Continuous>    MEDICATIONS  (PRN):  acetaminophen   Tablet .650 milliGRAM(s) Oral every 6 hours PRN Temp greater or equal to 38C (100.4F), Moderate Pain (4 - 6)  dextrose 40% Gel 15 Gram(s) Oral once PRN Blood Glucose LESS THAN 70 milliGRAM(s)/deciliter  docusate sodium 100 milliGRAM(s) Oral three times a day PRN Constipation  glucagon  Injectable 1 milliGRAM(s) IntraMuscular once PRN Glucose LESS THAN 70 milligrams/deciliter  oxyCODONE    5 mG/acetaminophen 325 mG 1 Tablet(s) Oral every 6 hours PRN Severe Pain (7 - 10)      HEALTH ISSUES - PROBLEM Dx:  Gout  Diabetes mellitus  HTN (hypertension)  CVA (cerebral vascular accident)  Parkinson disease  Fall

## 2018-10-11 NOTE — PHYSICAL THERAPY INITIAL EVALUATION ADULT - SPECIFY REASON(S)
Chart reviewed.  Ortho consult pending re: left knee pain and swelling.  Hold PT at this time.  Will follow up and complete evaluation after ortho clears patient.

## 2018-10-11 NOTE — PROGRESS NOTE ADULT - ASSESSMENT
Patient is a 69 y/o Male with PMH of HTN, Parkinson, DM, CVA, gout, recurrent falls. He was brought in by caregiver with a history of being found down. Patient had apparently sustained a fall on Monday night and was unable to able to get up thus remained on the floor until he was found a few minutes PTP. Patient on presentation complained of left shoulder and left knee pain. Much history could not be obtained due to h/o CVA with expressive aphasia and it is unclear if there was any LOC or head injury. At baseline he ambulates with walker but has been having recurrent falls. His last admission was on 7/5/18 when he had presented to the [facility] with similar complaints and treated for Acute Gouty Arthritis left knee and Parkinson's medications adjusted given the possibility of being the cause of the recurrent falls. Trauma work up was negative but x-ray showed widening of the acromioclavicular joint to 1.3 cm, which may represent instability and Left knee suprapatellar effusion. He was admitted for further evaluation.    Assessment and Plan:    1. s/p Fall:   Neurology following: Falls most likely multifactorial, DM neuropathy, PD, ? Seizures and h/o CVA.  EEG recommended. Will follow up.  Patient found down but unable to determine circumstances surrounding the fall. ? LOC.  Trauma work up negative for any fractures.  PT evaluation.      2. h/o CVA:  Continue Aspirin and Statin.      3. h/o Gout, left knee swelling:  Continue Allopurinol & Colchicine and prednisone.  WBAT LLE, ice, elevation PRN  Pain control with Percocet/Tylenol. Ortho consulted.      4. Parkinson's:  Continue Home medications.  Neurology following: recommends continuing current medications for now.       5. NIDDM:  Hemoglobin A1C, Whole Blood: 5.6 % (06.24.18 @ 05:50)  Continue Metformin.  Monitor FS.      DVT prophylaxis: Heparin

## 2018-10-11 NOTE — CONSULT NOTE ADULT - SUBJECTIVE AND OBJECTIVE BOX
HPI:  Patient is a 67 y/o Male with PMH of HTN, Parkinson, DM, CVA, gout, recurrent falls. He was brought in by caregiver with a history of being found down. Patient had apparently sustained a fall on Monday night and was unable to able to get up thus remained on the floor until he was found a few minutes PTP. Patient on presentation complained of left shoulder and left knee pain. Much history could not be obtained due to h/o CVA with expressive aphasia and it is unclear if there was any LOC or head injury. At baseline he ambulates with walker but has been having recurrent falls. His last admission was on 7/5/18 when he had presented to the [facility] with similar complaints and treated for Acute Gouty Arthritis left knee and Parkinson's medications adjusted given the possibility of being the cause of the recurrent falls. Trauma work up was negative but x-ray showed widening of the acromioclavicular joint to 1.3 cm, which may represent instability and Left knee suprapatellar effusion. He was admitted for further evaluation.  PTN  REFERRED TO ACUTE  REHAB  FOR  EVAL AND  TX   PAST MEDICAL & SURGICAL HISTORY:  Gout  Diabetes mellitus  HTN (hypertension)  CVA (cerebral vascular accident)  Parkinson disease  No significant past surgical history      Hospital Course:    TODAY'S SUBJECTIVE & REVIEW OF SYMPTOMS:     Constitutional WNL   Cardio WNL   Resp WNL   GI WNL  Heme WNL  Endo WNL  Skin WNL  MSK WNL  Neuro WNL  Cognitive WNL  Psych WNL      MEDICATIONS  (STANDING):  atorvastatin 40 milliGRAM(s) Oral at bedtime  carbidopa/levodopa  25/100 1 Tablet(s) Oral four times a day  carvedilol 12.5 milliGRAM(s) Oral every 12 hours  dextrose 5%. 1000 milliLiter(s) (50 mL/Hr) IV Continuous <Continuous>  dextrose 50% Injectable 12.5 Gram(s) IV Push once  dextrose 50% Injectable 25 Gram(s) IV Push once  dextrose 50% Injectable 25 Gram(s) IV Push once  enoxaparin Injectable 40 milliGRAM(s) SubCutaneous every 24 hours  entacapone 200 milliGRAM(s) Oral four times a day  escitalopram 10 milliGRAM(s) Oral daily  insulin lispro (HumaLOG) corrective regimen sliding scale   SubCutaneous three times a day before meals  lisinopril 20 milliGRAM(s) Oral daily  metFORMIN 500 milliGRAM(s) Oral two times a day  predniSONE   Tablet 40 milliGRAM(s) Oral daily  sodium chloride 0.9%. 1000 milliLiter(s) (50 mL/Hr) IV Continuous <Continuous>    MEDICATIONS  (PRN):  acetaminophen   Tablet .. 650 milliGRAM(s) Oral every 6 hours PRN Temp greater or equal to 38C (100.4F), Moderate Pain (4 - 6)  dextrose 40% Gel 15 Gram(s) Oral once PRN Blood Glucose LESS THAN 70 milliGRAM(s)/deciliter  docusate sodium 100 milliGRAM(s) Oral three times a day PRN Constipation  glucagon  Injectable 1 milliGRAM(s) IntraMuscular once PRN Glucose LESS THAN 70 milligrams/deciliter      FAMILY HISTORY:  No pertinent family history in first degree relatives      Allergies    cefaclor (Unknown)  penicillin (Unknown)  sulfa drugs (Unknown)    Intolerances        SOCIAL HISTORY:    [  ] Etoh  [  ] Smoking  [  ] Substance abuse     Home Environment:  [ xx ] Home Alone  [  ] Lives with Family  [  ] Home Health Aid    Dwelling:  [x  ] Apartment  [  ] Private House  [  ] Adult Home  [  ] Skilled Nursing Facility      [  ] Short Term  [  ] Long Term  [ - ] Stairs       Elevator [  ]    FUNCTIONAL STATUS PTA: (Check all that apply)  Ambulation: [  x ]Independent    [  ] Dependent     [  ] Non-Ambulatory  Assistive Device: [  ] SA Cane  [  x]  Q Cane  [x  ] Walker  [  ]  Wheelchair  ADL : [ x ] Independent  [  ]  Dependent       Vital Signs Last 24 Hrs  T(C): 36.8 (11 Oct 2018 06:02), Max: 37.6 (10 Oct 2018 11:45)  T(F): 98.2 (11 Oct 2018 06:02), Max: 99.6 (10 Oct 2018 11:45)  HR: 100 (11 Oct 2018 06:02) (86 - 114)  BP: 167/88 (11 Oct 2018 06:02) (143/84 - 189/105)  BP(mean): --  RR: 16 (11 Oct 2018 06:02) (16 - 20)  SpO2: 99% (10 Oct 2018 17:33) (96% - 99%)      PHYSICAL EXAM: Alert & Oriented X 2  GENERAL: NAD, well-groomed, well-developed  HEAD:  Atraumatic, Normocephalic  EYES: EOMI, PERRLA, conjunctiva and sclera clear  NECK: Supple, No JVD, Normal thyroid  CHEST/LUNG: Clear to percussion bilaterally; No rales, rhonchi, wheezing, or rubs  HEART: Regular rate and rhythm; No murmurs, rubs, or gallops  ABDOMEN: Soft, Nontender, Nondistended; Bowel sounds present  EXTREMITIES:  2+ Peripheral Pulses, No clubbing, cyanosis, or edema    NERVOUS SYSTEM:  Cranial Nerves 2-12 intact [  ] Abnormal  [x  ]  ROM: WFL all extremities [  ]  Abnormal [x  ]  Motor Strength: WFL all extremities  [  ]  Abnormal [ x ]  Sensation: intact to light touch [  ] Abnormal [x  ]  Reflexes: Symmetric [  ]  Abnormal [x  ]    FUNCTIONAL STATUS:  Bed Mobility: Independent [  ]  Supervision [  ]  Needs Assistance [ x ]  N/A [  ]  Transfers: Independent [  ]  Supervision [  ]  Needs Assistance [ x ]  N/A [  ]   Ambulation: Independent [  ]  Supervision [  ]  Needs Assistance [ x ]  N/A [  ]  ADL: Independent [ x ] Requires Assistance [  ] N/A [  ]  see  PT IE NOTES    LABS:                        15.4   16.23 )-----------( 132      ( 10 Oct 2018 11:50 )             44.5     10-10    143  |  102  |  16  ----------------------------<  145<H>  4.1   |  19  |  0.8    Ca    8.7      10 Oct 2018 11:50    TPro  6.3  /  Alb  3.7  /  TBili  3.0<H>  /  DBili  x   /  AST  31  /  ALT  26  /  AlkPhos  80  10-10      Urinalysis Basic - ( 10 Oct 2018 11:49 )    Color: Yellow / Appearance: Clear / SG: >=1.030 / pH: x  Gluc: x / Ketone: 160  / Bili: Small / Urobili: 0.2 mg/dL   Blood: x / Protein: 100 mg/dL / Nitrite: Negative   Leuk Esterase: Negative / RBC: 1-2 /HPF / WBC 1-2 /HPF   Sq Epi: x / Non Sq Epi: Few /HPF / Bacteria: Few        RADIOLOGY & ADDITIONAL STUDIES:< from: Xray Shoulder 2 Views, Left (10.10.18 @ 13:50) >  Findings/  impression:    Widening of the acromioclavicular joint to 1.3 cm, which may represent   instability. Weightbearing views may provide further information    No acute displaced fracture    There are degenerative changes of left shoulder    < end of copied text >      Assesment:

## 2018-10-11 NOTE — CONSULT NOTE ADULT - SUBJECTIVE AND OBJECTIVE BOX
Neurology Consultation note    Name  ZAKIA MILLS    HPI:  69 y/o M presented ED status post fall. Pt found on floor by friend. Pt states he has been on the floor since Monday night and was not able to get up. Pt presented with left shoulder and left knee pain. Pt has a h/o CVA with expressive aphasia since stroke. Pt normally ambulate with walker.    66 y/o M PMH of HTN, Parkinson, DM, CVA, gout is here s/p fall. History was obtained from relative/caregiver- Ms Caroline Alegre.   She states she saw him yesterday, when he complained of pain in his Lt knee, but his knee was not swollen. Later this morning, pt was not answering phone calls, so a friend went to check on him and found him on the floor and he was naked. Unknown if he lost consciousness/ hit head. He complained of pain in the Lt knee and it was swollen and he was brought to the ED. No c/o recent fever, trauma to the knee. No CP, SOB, Dizziness. Also pt at baseline has cognitive impairment, but he can hold a conversion and knows where he is, but at this time he only knows his name and he is very confused, which is not his baseline.  Pt is from [place] and he was moved to [place] due to history of recurrent falls. Unsure why he is falling, but caregiver states he has problem with balance and sometimes refuses to use a walker. Also he is being evaluated by a cardiologist as outpt for recurrent dizziness and falls. His last gout attack was a year ago.   This is a 67y Male who presents to the ED today with altered mental status; per patient's partner he fell earlier this morning, and had been having left knee pain for past day prior to fall. Per family member patient states he is unable to walk now. Unclear if lost consciousness or hit head. Denies fevers/chills. Reports episodes of gout to knee similar in past.     NEURO:  PATIENT IS A 67 YO MAN WITH HX OF L MCA CVA, DM, GOUT AND PARKINSONS DISEASE ADMIT FOR FALL. CIRCUMSTANCES UNKNOWN.  PATIENT WAS NOT ANSWERING PHONE CALLS SINCE MONDAY AND WAS FOUND ON THE FLOOR.  THIS IS NOT THE FIRST FALL. PATIENT WITH DIFFICULTY AMBULATING AT BASELINE      Vital Signs Last 24 Hrs  T(C): 36.6 (10 Oct 2018 19:20), Max: 37.6 (10 Oct 2018 11:45)  T(F): 97.8 (10 Oct 2018 19:20), Max: 99.6 (10 Oct 2018 11:45)  HR: 100 (10 Oct 2018 19:20) (86 - 114)  BP: 145/82 (10 Oct 2018 19:20) (143/84 - 189/105)  BP(mean): --  RR: 18 (10 Oct 2018 17:33) (18 - 20)  SpO2: 99% (10 Oct 2018 17:33) (96% - 99%)    Neurological Exam:   Mental status: Awake, alert and oriented x3.  Recent and remote memory intact.  Naming, repetition and comprehension intact.  Attention/concentration intact.  No dysarthria, no aphasia.  Fund of knowledge appropriate.    Cranial nerves: Pupils equally round and reactive to light, visual fields full, no nystagmus, extraocular muscles intact, V1 through V3 intact bilaterally and symmetric, face symmetric, hearing intact to finger rub, palate elevation symmetric, tongue was midline.  Motor:  MRC grading 5/5 b/l UE/LE.   strength 5/5.  Normal tone and bulk.  No abnormal movements.    Sensation: Intact to light touch, proprioception, and pinprick.   Coordination: No dysmetria on finger-to-nose and heel-to-shin.  No dysdiadokinesia.  Reflexes: 2+ in bilateral UE/LE, downgoing toes bilaterally. (-) Neal.  Gait: Narrow and steady. No ataxia.  Romberg negative    Medications  acetaminophen   Tablet .. 650 milliGRAM(s) Oral every 6 hours PRN  atorvastatin 40 milliGRAM(s) Oral at bedtime  carbidopa/levodopa  25/100 1 Tablet(s) Oral four times a day  carvedilol 12.5 milliGRAM(s) Oral every 12 hours  dextrose 40% Gel 15 Gram(s) Oral once PRN  dextrose 5%. 1000 milliLiter(s) IV Continuous <Continuous>  dextrose 50% Injectable 12.5 Gram(s) IV Push once  dextrose 50% Injectable 25 Gram(s) IV Push once  dextrose 50% Injectable 25 Gram(s) IV Push once  docusate sodium 100 milliGRAM(s) Oral three times a day PRN  enoxaparin Injectable 40 milliGRAM(s) SubCutaneous every 24 hours  entacapone 200 milliGRAM(s) Oral four times a day  escitalopram 10 milliGRAM(s) Oral daily  glucagon  Injectable 1 milliGRAM(s) IntraMuscular once PRN  insulin lispro (HumaLOG) corrective regimen sliding scale   SubCutaneous three times a day before meals  lisinopril 20 milliGRAM(s) Oral daily  metFORMIN 500 milliGRAM(s) Oral two times a day  predniSONE   Tablet 40 milliGRAM(s) Oral daily  sodium chloride 0.9%. 1000 milliLiter(s) IV Continuous <Continuous>      Lab  10-10    143  |  102  |  16  ----------------------------<  145<H>  4.1   |  19  |  0.8    Ca    8.7      10 Oct 2018 11:50    TPro  6.3  /  Alb  3.7  /  TBili  3.0<H>  /  DBili  x   /  AST  31  /  ALT  26  /  AlkPhos  80  10-10                          15.4   16.23 )-----------( 132      ( 10 Oct 2018 11:50 )             44.5     LIVER FUNCTIONS - ( 10 Oct 2018 11:50 )  Alb: 3.7 g/dL / Pro: 6.3 g/dL / ALK PHOS: 80 U/L / ALT: 26 U/L / AST: 31 U/L / GGT: x                   Radiology  CT Head No Cont:   EXAM:  CT BRAIN            PROCEDURE DATE:  10/10/2018            INTERPRETATION:  Clinical History / Reason for exam:  fall    Technique:  Multiple contiguous axial CT images of the brain were   obtained from base to vertex without administration of intravenous   contrast.      Comparison:  Head CT 6/20/2018.    Findings:      The cortical sulci and ventricular system are symmetrically prominent   consistent with age related cerebral volume loss.    Chronic lacunar infarction in the left basalganglia again seen. There   are scattered subcortical and periventricular hypodensities without mass   effect most consistent with chronic microvascular ischemic changes.    There is no mass effect, midline shift or intracranial hemorrhage.      The bones are intact without depressed skull fracture.    The visualized paranasal sinuses are unremarkable.  The mastoid air cells   are well aerated.      Impression:      No mass effect or intracranial hemorrhage.     Chronic microvascular ischemic changes. Chronic lacunar infarction in the   left basal ganglia.            Assessment: 67 YO MAN WITH THE AFOREMENTIONED HX P/W FALL OF UNKNOWN ETIOLOGY  GAIT DISTURBANCE    Plan:  1. CVA:  2. PD:  3. DM: Neurology Consultation note    Name  ZAKIA MILLS    HPI:  67 y/o M presented ED status post fall. Pt found on floor by friend. Pt states he has been on the floor since Monday night and was not able to get up. Pt presented with left shoulder and left knee pain. Pt has a h/o CVA with expressive aphasia since stroke. Pt normally ambulate with walker.    66 y/o M PMH of HTN, Parkinson, DM, CVA, gout is here s/p fall. History was obtained from relative/caregiver- Ms Caroline Alegre.   She states she saw him yesterday, when he complained of pain in his Lt knee, but his knee was not swollen. Later this morning, pt was not answering phone calls, so a friend went to check on him and found him on the floor and he was naked. Unknown if he lost consciousness/ hit head. He complained of pain in the Lt knee and it was swollen and he was brought to the ED. No c/o recent fever, trauma to the knee. No CP, SOB, Dizziness. Also pt at baseline has cognitive impairment, but he can hold a conversion and knows where he is, but at this time he only knows his name and he is very confused, which is not his baseline.  Pt is from [place] and he was moved to [place] due to history of recurrent falls. Unsure why he is falling, but caregiver states he has problem with balance and sometimes refuses to use a walker. Also he is being evaluated by a cardiologist as outpt for recurrent dizziness and falls. His last gout attack was a year ago.   This is a 67y Male who presents to the ED today with altered mental status; per patient's partner he fell earlier this morning, and had been having left knee pain for past day prior to fall. Per family member patient states he is unable to walk now. Unclear if lost consciousness or hit head. Denies fevers/chills. Reports episodes of gout to knee similar in past.     NEURO:  PATIENT IS A 69 YO MAN WITH HX OF CVA CVA, DM, GOUT AND PARKINSONS DISEASE ADMIT FOR FALL. CIRCUMSTANCES UNKNOWN.  PATIENT WAS NOT ANSWERING PHONE CALLS SINCE MONDAY AND WAS FOUND ON THE FLOOR.  THIS IS NOT THE FIRST FALL. PATIENT WITH DIFFICULTY AMBULATING AT BASELINE  PATIENT STATES THAT HE HAS BASELINE L SIDED WEAKNESS FROM CVA . HE IS SOMEWHAT POOR HISTORAIN RE HIS PD. HE DOES NOT KNOW HIS NEUROLOGIST  STATES THAT HE WALKS AT HOME WITHOUT ISSUE AND DENIES FREQUENT FALLS  HE DOES NOT RECALL EVENTS OF THIS FALL AT ALL  NOW HE HAS SEVERE BILATERAL KNEE PAIN WHICH IS LIMITING HIS LE MOVEMENT      Vital Signs Last 24 Hrs  T(C): 36.6 (10 Oct 2018 19:20), Max: 37.6 (10 Oct 2018 11:45)  T(F): 97.8 (10 Oct 2018 19:20), Max: 99.6 (10 Oct 2018 11:45)  HR: 100 (10 Oct 2018 19:20) (86 - 114)  BP: 145/82 (10 Oct 2018 19:20) (143/84 - 189/105)  BP(mean): --  RR: 18 (10 Oct 2018 17:33) (18 - 20)  SpO2: 99% (10 Oct 2018 17:33) (96% - 99%)    Neurological Exam:   Mental status: Awake, alert and oriented x3.  .  Naming, repetition and comprehension intact.  POORLY ATTENTIVE.  No dysarthria, no aphasia, BRADYPHRENIC AND HYPOPHONIC  Cranial nerves: Pupils equally round and reactive to light, visual fields full, no nystagmus, extraocular muscles intact, V1 through V3 intact bilaterally and symmetric, face symmetric, hearing intact to finger rub, palate elevation symmetric, tongue was midline.  Motor:  MRC grading 5/5 b/l UE, TRCA COLIN BUT PAIN LIMITED. PAIN IS MAINLY LOCALIZED AROUND BOTH KNEES   strength 5/5.  Normal tone and bulk.  LUE REST TREMOR, BILAT COGWHEELING AT WRISTS    Sensation: Intact to light touch, proprioception, and pinprick. DECREASED VIBRATION AT TOES  Coordination: No dysmetria on finger-to-nose and heel-to-shin.  No dysdiadokinesia.  Reflexes: DEPRESSED X 4  Gait: CANNOT AMBULATE AT THIS TIME    Medications  acetaminophen   Tablet .. 650 milliGRAM(s) Oral every 6 hours PRN  atorvastatin 40 milliGRAM(s) Oral at bedtime  carbidopa/levodopa  25/100 1 Tablet(s) Oral four times a day  carvedilol 12.5 milliGRAM(s) Oral every 12 hours  dextrose 40% Gel 15 Gram(s) Oral once PRN  dextrose 5%. 1000 milliLiter(s) IV Continuous <Continuous>  dextrose 50% Injectable 12.5 Gram(s) IV Push once  dextrose 50% Injectable 25 Gram(s) IV Push once  dextrose 50% Injectable 25 Gram(s) IV Push once  docusate sodium 100 milliGRAM(s) Oral three times a day PRN  enoxaparin Injectable 40 milliGRAM(s) SubCutaneous every 24 hours  entacapone 200 milliGRAM(s) Oral four times a day  escitalopram 10 milliGRAM(s) Oral daily  glucagon  Injectable 1 milliGRAM(s) IntraMuscular once PRN  insulin lispro (HumaLOG) corrective regimen sliding scale   SubCutaneous three times a day before meals  lisinopril 20 milliGRAM(s) Oral daily  metFORMIN 500 milliGRAM(s) Oral two times a day  predniSONE   Tablet 40 milliGRAM(s) Oral daily  sodium chloride 0.9%. 1000 milliLiter(s) IV Continuous <Continuous>      Lab  10-10    143  |  102  |  16  ----------------------------<  145<H>  4.1   |  19  |  0.8    Ca    8.7      10 Oct 2018 11:50    TPro  6.3  /  Alb  3.7  /  TBili  3.0<H>  /  DBili  x   /  AST  31  /  ALT  26  /  AlkPhos  80  10-10                          15.4   16.23 )-----------( 132      ( 10 Oct 2018 11:50 )             44.5     LIVER FUNCTIONS - ( 10 Oct 2018 11:50 )  Alb: 3.7 g/dL / Pro: 6.3 g/dL / ALK PHOS: 80 U/L / ALT: 26 U/L / AST: 31 U/L / GGT: x                   Radiology  CT Head No Cont:   EXAM:  CT BRAIN            PROCEDURE DATE:  10/10/2018            INTERPRETATION:  Clinical History / Reason for exam:  fall    Technique:  Multiple contiguous axial CT images of the brain were   obtained from base to vertex without administration of intravenous   contrast.      Comparison:  Head CT 6/20/2018.    Findings:      The cortical sulci and ventricular system are symmetrically prominent   consistent with age related cerebral volume loss.    Chronic lacunar infarction in the left basalganglia again seen. There   are scattered subcortical and periventricular hypodensities without mass   effect most consistent with chronic microvascular ischemic changes.    There is no mass effect, midline shift or intracranial hemorrhage.      The bones are intact without depressed skull fracture.    The visualized paranasal sinuses are unremarkable.  The mastoid air cells   are well aerated.      Impression:      No mass effect or intracranial hemorrhage.     Chronic microvascular ischemic changes. Chronic lacunar infarction in the   left basal ganglia.            Assessment: 69 YO MAN WITH THE AFOREMENTIONED HX P/W FALL OF UNKNOWN ETIOLOGY. UNCLEAR IF ANY LOC. DDX IS WIDE  GAIT DISTURBANCE IS LIKELY MULTIFACTORAIL (OLD CVA, PD, DM POLYNEUROPATHY)  DO NOT SUSPECT NEW CVA    Plan:  1. CVA: CONT ASA AND STATIN  2. PD: CONTINUE CURRENT PD REGIMEN FOR NOW OF SINEMET 25/100 AND COMTAN QID. MAY OPTIMIZE WHEN PATIENT IS ABLE TO WALK AFTER PAIN IN LE RESOLVES  3. DM POLYNEUROPATHY: EMG AS OUTPATIENT  4. REEG AS THE DETAILS OF FALL (? LOC) ARE NOT KNOWN AND PATIENT IS AT INCREASED SZ RISK  WILL FOLLOW

## 2018-10-12 LAB
ANION GAP SERPL CALC-SCNC: 14 MMOL/L — SIGNIFICANT CHANGE UP (ref 7–14)
BASOPHILS # BLD AUTO: 0.02 K/UL — SIGNIFICANT CHANGE UP (ref 0–0.2)
BASOPHILS NFR BLD AUTO: 0.2 % — SIGNIFICANT CHANGE UP (ref 0–1)
BUN SERPL-MCNC: 18 MG/DL — SIGNIFICANT CHANGE UP (ref 10–20)
CALCIUM SERPL-MCNC: 8.8 MG/DL — SIGNIFICANT CHANGE UP (ref 8.5–10.1)
CHLORIDE SERPL-SCNC: 106 MMOL/L — SIGNIFICANT CHANGE UP (ref 98–110)
CK SERPL-CCNC: 89 U/L — SIGNIFICANT CHANGE UP (ref 0–225)
CO2 SERPL-SCNC: 23 MMOL/L — SIGNIFICANT CHANGE UP (ref 17–32)
CREAT SERPL-MCNC: 0.9 MG/DL — SIGNIFICANT CHANGE UP (ref 0.7–1.5)
EOSINOPHIL # BLD AUTO: 0.02 K/UL — SIGNIFICANT CHANGE UP (ref 0–0.7)
EOSINOPHIL NFR BLD AUTO: 0.2 % — SIGNIFICANT CHANGE UP (ref 0–8)
GLUCOSE BLDC GLUCOMTR-MCNC: 122 MG/DL — HIGH (ref 70–99)
GLUCOSE BLDC GLUCOMTR-MCNC: 185 MG/DL — HIGH (ref 70–99)
GLUCOSE BLDC GLUCOMTR-MCNC: 230 MG/DL — HIGH (ref 70–99)
GLUCOSE BLDC GLUCOMTR-MCNC: 253 MG/DL — HIGH (ref 70–99)
GLUCOSE SERPL-MCNC: 260 MG/DL — HIGH (ref 70–99)
HCT VFR BLD CALC: 39.3 % — LOW (ref 42–52)
HGB BLD-MCNC: 13.4 G/DL — LOW (ref 14–18)
IMM GRANULOCYTES NFR BLD AUTO: 0.6 % — HIGH (ref 0.1–0.3)
LYMPHOCYTES # BLD AUTO: 1.41 K/UL — SIGNIFICANT CHANGE UP (ref 1.2–3.4)
LYMPHOCYTES # BLD AUTO: 11 % — LOW (ref 20.5–51.1)
MAGNESIUM SERPL-MCNC: 1.9 MG/DL — SIGNIFICANT CHANGE UP (ref 1.8–2.4)
MCHC RBC-ENTMCNC: 31.8 PG — HIGH (ref 27–31)
MCHC RBC-ENTMCNC: 34.1 G/DL — SIGNIFICANT CHANGE UP (ref 32–37)
MCV RBC AUTO: 93.3 FL — SIGNIFICANT CHANGE UP (ref 80–94)
MONOCYTES # BLD AUTO: 0.68 K/UL — HIGH (ref 0.1–0.6)
MONOCYTES NFR BLD AUTO: 5.3 % — SIGNIFICANT CHANGE UP (ref 1.7–9.3)
NEUTROPHILS # BLD AUTO: 10.64 K/UL — HIGH (ref 1.4–6.5)
NEUTROPHILS NFR BLD AUTO: 82.7 % — HIGH (ref 42.2–75.2)
NRBC # BLD: 0 /100 WBCS — SIGNIFICANT CHANGE UP (ref 0–0)
PLATELET # BLD AUTO: 164 K/UL — SIGNIFICANT CHANGE UP (ref 130–400)
POTASSIUM SERPL-MCNC: 4.7 MMOL/L — SIGNIFICANT CHANGE UP (ref 3.5–5)
POTASSIUM SERPL-SCNC: 4.7 MMOL/L — SIGNIFICANT CHANGE UP (ref 3.5–5)
RBC # BLD: 4.21 M/UL — LOW (ref 4.7–6.1)
RBC # FLD: 12.8 % — SIGNIFICANT CHANGE UP (ref 11.5–14.5)
SODIUM SERPL-SCNC: 143 MMOL/L — SIGNIFICANT CHANGE UP (ref 135–146)
URATE SERPL-MCNC: 5.7 MG/DL — SIGNIFICANT CHANGE UP (ref 3.4–8.8)
WBC # BLD: 12.85 K/UL — HIGH (ref 4.8–10.8)
WBC # FLD AUTO: 12.85 K/UL — HIGH (ref 4.8–10.8)

## 2018-10-12 RX ADMIN — Medication 40 MILLIGRAM(S): at 05:31

## 2018-10-12 RX ADMIN — ENTACAPONE 200 MILLIGRAM(S): 200 TABLET, FILM COATED ORAL at 11:45

## 2018-10-12 RX ADMIN — METFORMIN HYDROCHLORIDE 500 MILLIGRAM(S): 850 TABLET ORAL at 05:31

## 2018-10-12 RX ADMIN — CARBIDOPA AND LEVODOPA 1 TABLET(S): 25; 100 TABLET ORAL at 11:45

## 2018-10-12 RX ADMIN — Medication 3: at 16:48

## 2018-10-12 RX ADMIN — CARBIDOPA AND LEVODOPA 1 TABLET(S): 25; 100 TABLET ORAL at 17:10

## 2018-10-12 RX ADMIN — MAGNESIUM OXIDE 400 MG ORAL TABLET 400 MILLIGRAM(S): 241.3 TABLET ORAL at 11:45

## 2018-10-12 RX ADMIN — CARBIDOPA AND LEVODOPA 1 TABLET(S): 25; 100 TABLET ORAL at 05:31

## 2018-10-12 RX ADMIN — ESCITALOPRAM OXALATE 10 MILLIGRAM(S): 10 TABLET, FILM COATED ORAL at 11:45

## 2018-10-12 RX ADMIN — MAGNESIUM OXIDE 400 MG ORAL TABLET 400 MILLIGRAM(S): 241.3 TABLET ORAL at 08:10

## 2018-10-12 RX ADMIN — ENOXAPARIN SODIUM 40 MILLIGRAM(S): 100 INJECTION SUBCUTANEOUS at 21:34

## 2018-10-12 RX ADMIN — MAGNESIUM OXIDE 400 MG ORAL TABLET 400 MILLIGRAM(S): 241.3 TABLET ORAL at 17:10

## 2018-10-12 RX ADMIN — METFORMIN HYDROCHLORIDE 500 MILLIGRAM(S): 850 TABLET ORAL at 17:10

## 2018-10-12 RX ADMIN — LISINOPRIL 20 MILLIGRAM(S): 2.5 TABLET ORAL at 05:31

## 2018-10-12 RX ADMIN — ENTACAPONE 200 MILLIGRAM(S): 200 TABLET, FILM COATED ORAL at 05:31

## 2018-10-12 RX ADMIN — Medication 2: at 11:45

## 2018-10-12 RX ADMIN — CARVEDILOL PHOSPHATE 12.5 MILLIGRAM(S): 80 CAPSULE, EXTENDED RELEASE ORAL at 17:10

## 2018-10-12 RX ADMIN — ENTACAPONE 200 MILLIGRAM(S): 200 TABLET, FILM COATED ORAL at 17:10

## 2018-10-12 RX ADMIN — ATORVASTATIN CALCIUM 40 MILLIGRAM(S): 80 TABLET, FILM COATED ORAL at 21:34

## 2018-10-12 RX ADMIN — SODIUM CHLORIDE 50 MILLILITER(S): 9 INJECTION INTRAMUSCULAR; INTRAVENOUS; SUBCUTANEOUS at 16:47

## 2018-10-12 RX ADMIN — CARVEDILOL PHOSPHATE 12.5 MILLIGRAM(S): 80 CAPSULE, EXTENDED RELEASE ORAL at 05:31

## 2018-10-12 NOTE — PHYSICAL THERAPY INITIAL EVALUATION ADULT - GAIT DEVIATIONS NOTED, PT EVAL
decreased lexi/decreased step length/decreased weight-shifting ability/stooped posture, dec heel strike/pushoff

## 2018-10-12 NOTE — PROGRESS NOTE ADULT - SUBJECTIVE AND OBJECTIVE BOX
CC: f/u post fall and gout      ROS:    I spoke with RN duing daytime who expressed no acute concerns  I also spoke with caretaker and sister this evening who are concerned for atrophy and requesting acute rehab in         Vital Signs Last 24 Hrs  T(C): 35.9 (12 Oct 2018 14:00), Max: 35.9 (12 Oct 2018 14:00)  T(F): 96.7 (12 Oct 2018 14:00), Max: 96.7 (12 Oct 2018 14:00)  HR: 74 (12 Oct 2018 14:00) (70 - 82)  BP: 147/72 (12 Oct 2018 14:00) (129/77 - 147/72)  BP(mean): --  RR: 16 (12 Oct 2018 14:00) (16 - 16)  SpO2: --        PHYSICAL EXAM:  GENERAL: NAD, well-groomed, well-developed  HEAD = atraumatic, normoocehalic  EYES: conjunctiva and sclera clear  NECK: Supple, No JVD  NERVOUS SYSTEM:  Alert & Oriented X3, Good concentration  CHEST/LUNG: Clear to ausculatation bilaterally; No rales, rhonchi, wheezing, or rubs  HEART: Regular rate and rhythm; No murmurs, rubs, or gallops  ABDOMEN: Soft, Nontender, Nondistended; Bowel sounds present  EXTREMITIES:  BL No clubbing, cyanosis, or edema        DATA:      Culture - Urine (collected 10 Oct 2018 11:49)  Source: .Urine Catheterized  Final Report (11 Oct 2018 17:25):    No growth    CK 89                          13.4   12.85 )-----------( 164      ( 12 Oct 2018 09:19 )             39.3     10-12    143  |  106  |  18  ----------------------------<  260<H>  4.7   |  23  |  0.9    Ca    8.8      12 Oct 2018 15:24  Phos  2.4     10-11  Mg     1.9     10-12      EEG:  Abnormal routine EEG due to the presence of  1-borderline to mild generalized slowing as described above  2-borderline to mild left hemispheric focal  slowing as described above    CLINICAL CORRELATION  Consistent with borderline to mild diffuse and left hemispheric focal   electrophysiological dysfunction secondary to structural and superimposed   toxic metabolic causes clinical correlation is recommended CC: f/u post fall and gout      ROS:  pain is less in all the joint resolved in moist and persisting in left knee only  I spoke with RN duing daytime who expressed no acute concerns  I also spoke with caretaker and sister this evening who are concerned for atrophy and requesting acute rehab in [facility]        Vital Signs Last 24 Hrs  T(C): 35.9 (12 Oct 2018 14:00), Max: 35.9 (12 Oct 2018 14:00)  T(F): 96.7 (12 Oct 2018 14:00), Max: 96.7 (12 Oct 2018 14:00)  HR: 74 (12 Oct 2018 14:00) (70 - 82)  BP: 147/72 (12 Oct 2018 14:00) (129/77 - 147/72)  BP(mean): --  RR: 16 (12 Oct 2018 14:00) (16 - 16)  SpO2: --        PHYSICAL EXAM:  GENERAL: NAD, chronically ill appearing  NERVOUS SYSTEM:  Alert & Oriented, Good concentration  CHEST/LUNG: Clear to ausculatation bilaterally; No rales, rhonchi, wheezing, or rubs  HEART: Regular rate and rhythm; No murmurs, rubs, or gallops  ABDOMEN: Soft, Nontender  EXTREMITIES:  BL No clubbing, cyanosis, or edema BLE        DATA:      Culture - Urine (collected 10 Oct 2018 11:49)  Source: .Urine Catheterized  Final Report (11 Oct 2018 17:25):    No growth    CK 89                          13.4   12.85 )-----------( 164      ( 12 Oct 2018 09:19 )             39.3     10-12    143  |  106  |  18  ----------------------------<  260<H>  4.7   |  23  |  0.9    Ca    8.8      12 Oct 2018 15:24  Phos  2.4     10-11  Mg     1.9     10-12      EEG:  Abnormal routine EEG due to the presence of  1-borderline to mild generalized slowing as described above  2-borderline to mild left hemispheric focal  slowing as described above    CLINICAL CORRELATION  Consistent with borderline to mild diffuse and left hemispheric focal   electrophysiological dysfunction secondary to structural and superimposed   toxic metabolic causes clinical correlation is recommended

## 2018-10-12 NOTE — PHYSICAL THERAPY INITIAL EVALUATION ADULT - GENERAL OBSERVATIONS, REHAB EVAL
14:50-15:05 Chart reviewed. Pt encountered semireclined in bed,  may be seen by Physical Therapist as confirmed with Nurse. Patient denied pain at rest but "moderate" pain on (L) knee with movement, Rn aware; +gabriel

## 2018-10-12 NOTE — PROGRESS NOTE ADULT - ASSESSMENT
67 y/o Male with PMH that includes HTN, Parkinson, DM, CVA, gout, recurrent falls. He was brought in by caregiver with a history of being found down. Patient had apparently sustained a fall on Monday night and was unable to able to get up thus remained on the floor until he was found 2 days later. Patient on presentation complained of left shoulder and left knee pain. Much history could not be obtained due to h/o CVA with expressive aphasia. At baseline he ambulates with walker but has been having recurrent falls. Trauma work up was negative but x-ray showed widening of the acromioclavicular joint to 1.3 cm, which was evaluated by orthoedics. He was admitted for further evaluation.    Relative/caregiver- Ms Caroline Alegre. Spoke with his sister Yarelis [phone] who is concerned about atrophy, and think he has depression and a lack of desire to participate and feels he is atrophied as has not regularly mobilized in years. has a caretaker during the day but is alone at night. supposed top walk with a walker.      # s/p Fall: with parkinson's disease s/op seen by neurology: Falls most likely multifactorial, DM neuropathy, PD, ? Seizures and h/o CVA.  - neuro followup requested given abnormal EEG  - PT / PMR recommends subacute rehab. family insists he needs acute rehab in [address] [place] [address]. they will discuss further with PMR MD and with CM  - family is concerned for underlying depression, without SI or HI. as such they will arrange an outpatient psych followup for him  - outpatient EMG rec by neuro      # Parkinsons:   - CONT SINEMET 25/100 AND COMTAN 4x per day      # Gout: seen by ortho for this. of note ortho not concerned of AC joint, amna has DJD  - Continue Allopurinol & Colchicine and prednisone.      # h/o CVA:  - Continue Aspirin and Statin.      # Coordination of care:  - DVT prophylaxis: Heparin  - sister wants him to go to acute rehab, and she will speak with PMR MD and CM  - outpatient f/u for family concerns over depression  - spoke to sister and caretaker today

## 2018-10-12 NOTE — PHYSICAL THERAPY INITIAL EVALUATION ADULT - IMPAIRED TRANSFERS: SIT/STAND, REHAB EVAL
decreased endurance/narrow base of support/pain/impaired postural control/decreased strength/impaired balance

## 2018-10-12 NOTE — PHYSICAL THERAPY INITIAL EVALUATION ADULT - IMPAIRMENTS CONTRIBUTING TO GAIT DEVIATIONS, PT EVAL
impaired balance/narrow base of support/pain/impaired postural control/decreased strength/decreased endurance

## 2018-10-12 NOTE — PHYSICAL THERAPY INITIAL EVALUATION ADULT - IMPAIRMENTS CONTRIBUTING IMPAIRED BED MOBILITY, REHAB EVAL
impaired balance/decreased endurance/narrow base of support/pain/impaired postural control/decreased strength

## 2018-10-12 NOTE — CONSULT NOTE ADULT - SUBJECTIVE AND OBJECTIVE BOX
I saw this patient on 10/11 and am writing consult on 10/12  I did discuss his care with medical attending on 10/11      The patient cannot ambulate.  he has a history of falls but this was not a witnessed event.  He was on the ground for a while because he could not stand up.    He is in the hospital now because of his difficulty with walking    he has a history of gout and was treated with steroids in the past with good results    PE    has a left knee effusion but no redness    has pain with ROM of knee     no pain with ROM of his hips    xray    pelvis no fx  knee no fx   shoulder has DJD

## 2018-10-13 LAB
ANION GAP SERPL CALC-SCNC: 15 MMOL/L — HIGH (ref 7–14)
BUN SERPL-MCNC: 19 MG/DL — SIGNIFICANT CHANGE UP (ref 10–20)
CALCIUM SERPL-MCNC: 8.1 MG/DL — LOW (ref 8.5–10.1)
CHLORIDE SERPL-SCNC: 106 MMOL/L — SIGNIFICANT CHANGE UP (ref 98–110)
CO2 SERPL-SCNC: 21 MMOL/L — SIGNIFICANT CHANGE UP (ref 17–32)
CREAT SERPL-MCNC: 0.8 MG/DL — SIGNIFICANT CHANGE UP (ref 0.7–1.5)
GLUCOSE BLDC GLUCOMTR-MCNC: 192 MG/DL — HIGH (ref 70–99)
GLUCOSE BLDC GLUCOMTR-MCNC: 195 MG/DL — HIGH (ref 70–99)
GLUCOSE BLDC GLUCOMTR-MCNC: 239 MG/DL — HIGH (ref 70–99)
GLUCOSE BLDC GLUCOMTR-MCNC: 91 MG/DL — SIGNIFICANT CHANGE UP (ref 70–99)
GLUCOSE SERPL-MCNC: 82 MG/DL — SIGNIFICANT CHANGE UP (ref 70–99)
HCT VFR BLD CALC: 38.5 % — LOW (ref 42–52)
HGB BLD-MCNC: 13 G/DL — LOW (ref 14–18)
MAGNESIUM SERPL-MCNC: 1.8 MG/DL — SIGNIFICANT CHANGE UP (ref 1.8–2.4)
MCHC RBC-ENTMCNC: 31.8 PG — HIGH (ref 27–31)
MCHC RBC-ENTMCNC: 33.8 G/DL — SIGNIFICANT CHANGE UP (ref 32–37)
MCV RBC AUTO: 94.1 FL — HIGH (ref 80–94)
NRBC # BLD: 0 /100 WBCS — SIGNIFICANT CHANGE UP (ref 0–0)
PLATELET # BLD AUTO: 173 K/UL — SIGNIFICANT CHANGE UP (ref 130–400)
POTASSIUM SERPL-MCNC: 4 MMOL/L — SIGNIFICANT CHANGE UP (ref 3.5–5)
POTASSIUM SERPL-SCNC: 4 MMOL/L — SIGNIFICANT CHANGE UP (ref 3.5–5)
RBC # BLD: 4.09 M/UL — LOW (ref 4.7–6.1)
RBC # FLD: 12.8 % — SIGNIFICANT CHANGE UP (ref 11.5–14.5)
SODIUM SERPL-SCNC: 142 MMOL/L — SIGNIFICANT CHANGE UP (ref 135–146)
WBC # BLD: 10.58 K/UL — SIGNIFICANT CHANGE UP (ref 4.8–10.8)
WBC # FLD AUTO: 10.58 K/UL — SIGNIFICANT CHANGE UP (ref 4.8–10.8)

## 2018-10-13 RX ORDER — ASPIRIN/CALCIUM CARB/MAGNESIUM 324 MG
81 TABLET ORAL DAILY
Qty: 0 | Refills: 0 | Status: DISCONTINUED | OUTPATIENT
Start: 2018-10-13 | End: 2018-10-16

## 2018-10-13 RX ADMIN — ENTACAPONE 200 MILLIGRAM(S): 200 TABLET, FILM COATED ORAL at 11:19

## 2018-10-13 RX ADMIN — CARBIDOPA AND LEVODOPA 1 TABLET(S): 25; 100 TABLET ORAL at 23:47

## 2018-10-13 RX ADMIN — CARBIDOPA AND LEVODOPA 1 TABLET(S): 25; 100 TABLET ORAL at 11:19

## 2018-10-13 RX ADMIN — CARBIDOPA AND LEVODOPA 1 TABLET(S): 25; 100 TABLET ORAL at 00:18

## 2018-10-13 RX ADMIN — Medication 40 MILLIGRAM(S): at 06:32

## 2018-10-13 RX ADMIN — ENTACAPONE 200 MILLIGRAM(S): 200 TABLET, FILM COATED ORAL at 23:47

## 2018-10-13 RX ADMIN — ENOXAPARIN SODIUM 40 MILLIGRAM(S): 100 INJECTION SUBCUTANEOUS at 21:01

## 2018-10-13 RX ADMIN — METFORMIN HYDROCHLORIDE 500 MILLIGRAM(S): 850 TABLET ORAL at 17:04

## 2018-10-13 RX ADMIN — OXYCODONE AND ACETAMINOPHEN 1 TABLET(S): 5; 325 TABLET ORAL at 15:58

## 2018-10-13 RX ADMIN — ATORVASTATIN CALCIUM 40 MILLIGRAM(S): 80 TABLET, FILM COATED ORAL at 21:01

## 2018-10-13 RX ADMIN — LISINOPRIL 20 MILLIGRAM(S): 2.5 TABLET ORAL at 06:32

## 2018-10-13 RX ADMIN — MAGNESIUM OXIDE 400 MG ORAL TABLET 400 MILLIGRAM(S): 241.3 TABLET ORAL at 12:14

## 2018-10-13 RX ADMIN — Medication 1: at 12:14

## 2018-10-13 RX ADMIN — MAGNESIUM OXIDE 400 MG ORAL TABLET 400 MILLIGRAM(S): 241.3 TABLET ORAL at 08:14

## 2018-10-13 RX ADMIN — ESCITALOPRAM OXALATE 10 MILLIGRAM(S): 10 TABLET, FILM COATED ORAL at 11:19

## 2018-10-13 RX ADMIN — Medication 2: at 16:01

## 2018-10-13 RX ADMIN — CARVEDILOL PHOSPHATE 12.5 MILLIGRAM(S): 80 CAPSULE, EXTENDED RELEASE ORAL at 17:04

## 2018-10-13 RX ADMIN — METFORMIN HYDROCHLORIDE 500 MILLIGRAM(S): 850 TABLET ORAL at 08:14

## 2018-10-13 RX ADMIN — ENTACAPONE 200 MILLIGRAM(S): 200 TABLET, FILM COATED ORAL at 00:18

## 2018-10-13 RX ADMIN — CARBIDOPA AND LEVODOPA 1 TABLET(S): 25; 100 TABLET ORAL at 17:04

## 2018-10-13 RX ADMIN — ENTACAPONE 200 MILLIGRAM(S): 200 TABLET, FILM COATED ORAL at 06:32

## 2018-10-13 RX ADMIN — MAGNESIUM OXIDE 400 MG ORAL TABLET 400 MILLIGRAM(S): 241.3 TABLET ORAL at 17:05

## 2018-10-13 RX ADMIN — Medication 81 MILLIGRAM(S): at 11:19

## 2018-10-13 RX ADMIN — ENTACAPONE 200 MILLIGRAM(S): 200 TABLET, FILM COATED ORAL at 17:05

## 2018-10-13 RX ADMIN — CARBIDOPA AND LEVODOPA 1 TABLET(S): 25; 100 TABLET ORAL at 06:32

## 2018-10-13 NOTE — PROGRESS NOTE ADULT - ASSESSMENT
69 y/o Male with PMH that includes HTN, Parkinson, DM, CVA, gout, recurrent falls. He was brought in by caregiver with a history of being found down. Patient had apparently sustained a fall on Monday night and was unable to able to get up thus remained on the floor until he was found 2 days later. Patient on presentation complained of left shoulder and left knee pain. Much history could not be obtained due to h/o CVA with expressive aphasia. At baseline he ambulates with walker but has been having recurrent falls. Trauma work up was negative but x-ray showed widening of the acromioclavicular joint to 1.3 cm, which was evaluated by orthoedics. He was admitted for further evaluation.    Relative/caregiver- Ms Caroline Alegre. I spoke with Ms Alegre this am and to sister Yarelis [phone] yesterday. they are concerned for atrophy, and think he has depression and a lack of desire to participate and feels he is atrophied as has not regularly mobilized in years. has a caretaker during the day but is alone at night. supposed top walk with a walker.      # s/p Fall: with parkinson's disease s/op seen by neurology: Falls most likely multifactorial, DM neuropathy, PD, ? Seizures and h/o CVA.  - neuro followup requested given abnormal EEG  - PT / PMR recommends subacute rehab. family insists he needs acute rehab in [address] [place] [address]. they will discuss further with PMR MD and with CM  - family is concerned for underlying depression, without SI or HI. as such they will arrange an outpatient psych followup for him  - outpatient EMG rec by neuro      # Parkinsons:   - CONT SINEMET 25/100 AND COMTAN 4x per day      # Gout: seen by ortho for this. of note ortho not concerned of AC joint, amna has DJD  - Continue prednisone.      # h/o CVA:  - Continue Aspirin and Statin.      # Coordination of care:  - DVT prophylaxis: Heparin  - sister wants him to go to acute rehab, and she will speak with PMR MD and CM  - outpatient f/u for family concerns over depression  - plan of care reviewed with RN. spoke to caretaker earlier today and to sister yesterday over the phone 69 y/o Male with PMH that includes HTN, Parkinson, DM, CVA, gout, recurrent falls. He was brought in by caregiver with a history of being found down. Patient had apparently sustained a fall on Monday night and was unable to able to get up thus remained on the floor until he was found 2 days later. Patient on presentation complained of left shoulder and left knee pain. Much history could not be obtained due to h/o CVA with expressive aphasia. At baseline he ambulates with walker but has been having recurrent falls. Trauma work up was negative but x-ray showed widening of the acromioclavicular joint to 1.3 cm, which was evaluated by orthoedics. He was admitted for further evaluation.    Relative/caregiver- Ms Caroline Alegre. I spoke with Ms Alegre this am and to sister Yarelis [phone] yesterday. they are concerned for atrophy, and think he has depression and a lack of desire to participate and feels he is atrophied as has not regularly mobilized in years. has a caretaker during the day but is alone at night. supposed top walk with a walker.      # s/p Fall: with parkinson's disease s/p seen by neurology: Falls most likely multifactorial, DM neuropathy, PD, ? Seizures and h/o CVA.  - neuro followup requested given abnormal EEG  - PT / PMR recommends subacute rehab. family insists he needs acute rehab in [address] [place] [address]. they will discuss further with PMR MD and with CM  - family is concerned for underlying depression, without SI or HI. as such they will arrange an outpatient psych followup for him  - outpatient EMG rec by neuro      # Parkinsons:   - CONT SINEMET 25/100 AND COMTAN 4x per day      # Gout: seen by ortho for this. of note ortho not concerned of AC joint, amna has DJD  - Continue prednisone.      # h/o CVA:  - Continue Aspirin and Statin.      # Coordination of care:  - DVT prophylaxis: Heparin  - sister wants him to go to acute rehab, and she will speak with PMR MD and CM  - outpatient f/u for family concerns over depression  - plan of care reviewed with RN. spoke to sister over the phone today

## 2018-10-13 NOTE — PROGRESS NOTE ADULT - SUBJECTIVE AND OBJECTIVE BOX
CC: f/u gout      INTERVAL EVENTS INCLUDE:  no interval red flags reported  RN informs me there are no acute concerns family visited today, and Mr Ambriz passed voiding trial  I did speak to caretaker this am, confirmed home ASA and resumed it      ROS:          Vital Signs Last 24 Hrs  T(C): 36.9 (13 Oct 2018 14:00), Max: 36.9 (13 Oct 2018 14:00)  T(F): 98.4 (13 Oct 2018 14:00), Max: 98.4 (13 Oct 2018 14:00)  HR: 61 (13 Oct 2018 14:00) (56 - 65)  BP: 165/90 (13 Oct 2018 14:00) (143/89 - 165/90)  BP(mean): --  RR: 16 (13 Oct 2018 14:00) (15 - 17)  SpO2: --        PHYSICAL EXAM:  GENERAL: NAD, chronically ill appearing  NERVOUS SYSTEM:  Alert & Oriented, Good concentration  CHEST/LUNG: Clear to ausculatation bilaterally; No rales, rhonchi, wheezing, or rubs  HEART: Regular rate and rhythm; No murmurs, rubs, or gallops  ABDOMEN: Soft, Nontender  EXTREMITIES:  BL No clubbing, cyanosis, or edema BLE        DATA:      EEG:  Abnormal routine EEG due to the presence of  1-borderline to mild generalized slowing as described above  2-borderline to mild left hemispheric focal  slowing as described above    CLINICAL CORRELATION  Consistent with borderline to mild diffuse and left hemispheric focal   electrophysiological dysfunction secondary to structural and superimposed   toxic metabolic causes clinical correlation is recommended        Culture - Urine (collected 10 Oct 2018 11:49)  Source: .Urine Catheterized  Final Report (11 Oct 2018 17:25):    No growth                            13.0   10.58 )-----------( 173      ( 13 Oct 2018 06:52 )             38.5       10-13    142  |  106  |  19  ----------------------------<  82  4.0   |  21  |  0.8    Ca    8.1<L>      13 Oct 2018 06:52  Mg     1.8     10-13 CC: f/u gout      INTERVAL EVENTS INCLUDE:  no interval red flags reported  RN informs me there are no acute concerns family visited today, and Mr Ambriz passed voiding trial  I did speak to caretaker this am, confirmed home ASA and resumed it      ROS:  tells me pain is consistently getting better  mostly resolved except left knee  no other concerns      Vital Signs Last 24 Hrs  T(C): 36.9 (13 Oct 2018 14:00), Max: 36.9 (13 Oct 2018 14:00)  T(F): 98.4 (13 Oct 2018 14:00), Max: 98.4 (13 Oct 2018 14:00)  HR: 61 (13 Oct 2018 14:00) (56 - 65)  BP: 165/90 (13 Oct 2018 14:00) (143/89 - 165/90)  BP(mean): --  RR: 16 (13 Oct 2018 14:00) (15 - 17)  SpO2: --        PHYSICAL EXAM:  GENERAL: NAD, chronically ill appearing  NERVOUS SYSTEM:  Alert & Oriented, Good concentration  CHEST/LUNG: Clear to auscultation bilaterally; No rales, rhonchi, wheezing, or rubs  HEART: Regular rate and rhythm; No murmurs, rubs, or gallops  ABDOMEN: Soft, Nontender  EXTREMITIES:  BL No clubbing, cyanosis, or edema BLE. he is able to  bend both knees and has an improved range of motion in the BL lower etremities        DATA:      EEG:  Abnormal routine EEG due to the presence of  1-borderline to mild generalized slowing as described above  2-borderline to mild left hemispheric focal  slowing as described above    CLINICAL CORRELATION  Consistent with borderline to mild diffuse and left hemispheric focal   electrophysiological dysfunction secondary to structural and superimposed   toxic metabolic causes clinical correlation is recommended        Culture - Urine (collected 10 Oct 2018 11:49)  Source: .Urine Catheterized  Final Report (11 Oct 2018 17:25):    No growth                            13.0   10.58 )-----------( 173      ( 13 Oct 2018 06:52 )             38.5       10-13    142  |  106  |  19  ----------------------------<  82  4.0   |  21  |  0.8    Ca    8.1<L>      13 Oct 2018 06:52  Mg     1.8     10-13

## 2018-10-14 LAB
ANION GAP SERPL CALC-SCNC: 15 MMOL/L — HIGH (ref 7–14)
APPEARANCE UR: ABNORMAL
BACTERIA # UR AUTO: ABNORMAL
BILIRUB UR-MCNC: NEGATIVE — SIGNIFICANT CHANGE UP
BUN SERPL-MCNC: 14 MG/DL — SIGNIFICANT CHANGE UP (ref 10–20)
CALCIUM SERPL-MCNC: 8.4 MG/DL — LOW (ref 8.5–10.1)
CHLORIDE SERPL-SCNC: 101 MMOL/L — SIGNIFICANT CHANGE UP (ref 98–110)
CO2 SERPL-SCNC: 24 MMOL/L — SIGNIFICANT CHANGE UP (ref 17–32)
COD CRY URNS QL: NEGATIVE — SIGNIFICANT CHANGE UP
COLOR SPEC: YELLOW — SIGNIFICANT CHANGE UP
CREAT SERPL-MCNC: 0.8 MG/DL — SIGNIFICANT CHANGE UP (ref 0.7–1.5)
DIFF PNL FLD: ABNORMAL
EPI CELLS # UR: NEGATIVE — SIGNIFICANT CHANGE UP
GLUCOSE BLDC GLUCOMTR-MCNC: 162 MG/DL — HIGH (ref 70–99)
GLUCOSE BLDC GLUCOMTR-MCNC: 187 MG/DL — HIGH (ref 70–99)
GLUCOSE BLDC GLUCOMTR-MCNC: 217 MG/DL — HIGH (ref 70–99)
GLUCOSE BLDC GLUCOMTR-MCNC: 84 MG/DL — SIGNIFICANT CHANGE UP (ref 70–99)
GLUCOSE SERPL-MCNC: 75 MG/DL — SIGNIFICANT CHANGE UP (ref 70–99)
GLUCOSE UR QL: NEGATIVE MG/DL — SIGNIFICANT CHANGE UP
GRAN CASTS # UR COMP ASSIST: NEGATIVE — SIGNIFICANT CHANGE UP
HCT VFR BLD CALC: 41.8 % — LOW (ref 42–52)
HGB BLD-MCNC: 14.2 G/DL — SIGNIFICANT CHANGE UP (ref 14–18)
HYALINE CASTS # UR AUTO: NEGATIVE — SIGNIFICANT CHANGE UP
KETONES UR-MCNC: ABNORMAL
LEUKOCYTE ESTERASE UR-ACNC: ABNORMAL
MAGNESIUM SERPL-MCNC: 1.8 MG/DL — SIGNIFICANT CHANGE UP (ref 1.8–2.4)
MCHC RBC-ENTMCNC: 32 PG — HIGH (ref 27–31)
MCHC RBC-ENTMCNC: 34 G/DL — SIGNIFICANT CHANGE UP (ref 32–37)
MCV RBC AUTO: 94.1 FL — HIGH (ref 80–94)
NITRITE UR-MCNC: POSITIVE
NRBC # BLD: 0 /100 WBCS — SIGNIFICANT CHANGE UP (ref 0–0)
PH UR: 6 — SIGNIFICANT CHANGE UP (ref 5–8)
PLATELET # BLD AUTO: 179 K/UL — SIGNIFICANT CHANGE UP (ref 130–400)
POTASSIUM SERPL-MCNC: 5 MMOL/L — SIGNIFICANT CHANGE UP (ref 3.5–5)
POTASSIUM SERPL-SCNC: 5 MMOL/L — SIGNIFICANT CHANGE UP (ref 3.5–5)
PROT UR-MCNC: 100 MG/DL
RBC # BLD: 4.44 M/UL — LOW (ref 4.7–6.1)
RBC # FLD: 12.6 % — SIGNIFICANT CHANGE UP (ref 11.5–14.5)
RBC CASTS # UR COMP ASSIST: ABNORMAL /HPF
SODIUM SERPL-SCNC: 140 MMOL/L — SIGNIFICANT CHANGE UP (ref 135–146)
SP GR SPEC: 1.02 — SIGNIFICANT CHANGE UP (ref 1.01–1.03)
TRI-PHOS CRY UR QL COMP ASSIST: NEGATIVE — SIGNIFICANT CHANGE UP
URATE CRY FLD QL MICRO: NEGATIVE — SIGNIFICANT CHANGE UP
UROBILINOGEN FLD QL: 1 MG/DL (ref 0.2–0.2)
WBC # BLD: 13.34 K/UL — HIGH (ref 4.8–10.8)
WBC # FLD AUTO: 13.34 K/UL — HIGH (ref 4.8–10.8)
WBC UR QL: ABNORMAL /HPF

## 2018-10-14 RX ORDER — AZTREONAM 2 G
1000 VIAL (EA) INJECTION ONCE
Qty: 0 | Refills: 0 | Status: COMPLETED | OUTPATIENT
Start: 2018-10-14 | End: 2018-10-14

## 2018-10-14 RX ORDER — AZTREONAM 2 G
VIAL (EA) INJECTION
Qty: 0 | Refills: 0 | Status: DISCONTINUED | OUTPATIENT
Start: 2018-10-14 | End: 2018-10-16

## 2018-10-14 RX ORDER — AMLODIPINE BESYLATE 2.5 MG/1
5 TABLET ORAL AT BEDTIME
Qty: 0 | Refills: 0 | Status: DISCONTINUED | OUTPATIENT
Start: 2018-10-14 | End: 2018-10-16

## 2018-10-14 RX ORDER — AZTREONAM 2 G
1000 VIAL (EA) INJECTION EVERY 8 HOURS
Qty: 0 | Refills: 0 | Status: DISCONTINUED | OUTPATIENT
Start: 2018-10-15 | End: 2018-10-16

## 2018-10-14 RX ADMIN — ENOXAPARIN SODIUM 40 MILLIGRAM(S): 100 INJECTION SUBCUTANEOUS at 21:11

## 2018-10-14 RX ADMIN — CARBIDOPA AND LEVODOPA 1 TABLET(S): 25; 100 TABLET ORAL at 11:13

## 2018-10-14 RX ADMIN — ENTACAPONE 200 MILLIGRAM(S): 200 TABLET, FILM COATED ORAL at 18:19

## 2018-10-14 RX ADMIN — CARBIDOPA AND LEVODOPA 1 TABLET(S): 25; 100 TABLET ORAL at 06:55

## 2018-10-14 RX ADMIN — ENTACAPONE 200 MILLIGRAM(S): 200 TABLET, FILM COATED ORAL at 06:55

## 2018-10-14 RX ADMIN — Medication 40 MILLIGRAM(S): at 06:55

## 2018-10-14 RX ADMIN — Medication 1: at 12:16

## 2018-10-14 RX ADMIN — CARVEDILOL PHOSPHATE 12.5 MILLIGRAM(S): 80 CAPSULE, EXTENDED RELEASE ORAL at 18:19

## 2018-10-14 RX ADMIN — METFORMIN HYDROCHLORIDE 500 MILLIGRAM(S): 850 TABLET ORAL at 06:55

## 2018-10-14 RX ADMIN — MAGNESIUM OXIDE 400 MG ORAL TABLET 400 MILLIGRAM(S): 241.3 TABLET ORAL at 11:15

## 2018-10-14 RX ADMIN — Medication 0.1 MILLIGRAM(S): at 01:49

## 2018-10-14 RX ADMIN — MAGNESIUM OXIDE 400 MG ORAL TABLET 400 MILLIGRAM(S): 241.3 TABLET ORAL at 18:19

## 2018-10-14 RX ADMIN — Medication 2: at 16:05

## 2018-10-14 RX ADMIN — ENTACAPONE 200 MILLIGRAM(S): 200 TABLET, FILM COATED ORAL at 23:52

## 2018-10-14 RX ADMIN — METFORMIN HYDROCHLORIDE 500 MILLIGRAM(S): 850 TABLET ORAL at 18:19

## 2018-10-14 RX ADMIN — ENTACAPONE 200 MILLIGRAM(S): 200 TABLET, FILM COATED ORAL at 11:13

## 2018-10-14 RX ADMIN — Medication 50 MILLIGRAM(S): at 21:15

## 2018-10-14 RX ADMIN — ATORVASTATIN CALCIUM 40 MILLIGRAM(S): 80 TABLET, FILM COATED ORAL at 21:12

## 2018-10-14 RX ADMIN — CARBIDOPA AND LEVODOPA 1 TABLET(S): 25; 100 TABLET ORAL at 18:19

## 2018-10-14 RX ADMIN — MAGNESIUM OXIDE 400 MG ORAL TABLET 400 MILLIGRAM(S): 241.3 TABLET ORAL at 09:13

## 2018-10-14 RX ADMIN — Medication 81 MILLIGRAM(S): at 11:13

## 2018-10-14 RX ADMIN — CARBIDOPA AND LEVODOPA 1 TABLET(S): 25; 100 TABLET ORAL at 23:52

## 2018-10-14 RX ADMIN — ESCITALOPRAM OXALATE 10 MILLIGRAM(S): 10 TABLET, FILM COATED ORAL at 11:13

## 2018-10-14 RX ADMIN — AMLODIPINE BESYLATE 5 MILLIGRAM(S): 2.5 TABLET ORAL at 21:12

## 2018-10-14 RX ADMIN — LISINOPRIL 20 MILLIGRAM(S): 2.5 TABLET ORAL at 06:55

## 2018-10-14 RX ADMIN — CARVEDILOL PHOSPHATE 12.5 MILLIGRAM(S): 80 CAPSULE, EXTENDED RELEASE ORAL at 06:55

## 2018-10-14 NOTE — CHART NOTE - NSCHARTNOTEFT_GEN_A_CORE
I was asked to come back in f/u due to abnormal EEG.  This showed slowing worse on left side.  No report of epileptiform findings.    Patient c/o unsteady feeling, vertigo he states whenever he turns his head back or to sides.  He seem to acknowledge more unsteady feeling than spinning.    Heath-pike performed and patient felt unsteady head tilted back and to left and to right equally.  Similarly when supine and head rotated left and right felt unsteady.    He has likely diabetic neuropathy.   He has parkinson disease.    Impression/Plan:  1.  EEG nonspecific findings, do not indicate increased risk for seizure, likely secondary to vascular changes in brain.  No treatment needed for the  abnormal result other than continue efforts at stroke prevention.  2.  Falls multifactorial - orthopedic problems, parksinonism, cervicogenic dysequilibrium, diabetic neuropathy none of which have cure, but need to be managed.  Needs to use walker.    Need PT gait evaluation.  May benefit from rehab.  3.  Cognitive issues may be related to strokes and parkinsons and will probably progress.  4.  Outpatient neurologic f/u in 2-4 weeks.

## 2018-10-14 NOTE — PROGRESS NOTE ADULT - SUBJECTIVE AND OBJECTIVE BOX
CC: f/u gout      INTERVAL EVENTS INCLUDE:  received clonidine x1 overnight for uncontrolled HTN  seen by neuro who advised EEG is non-epileptogenic  spoke to sister, who now tells me she is concerned for confusion, though she was not as concerned for this yesterday  noted with incontinence today, sent off a UA which showed UTI        ROS:        Vital Signs Last 24 Hrs  Vital Signs Last 24 Hrs  T(C): 36.3 (14 Oct 2018 14:00), Max: 36.3 (14 Oct 2018 14:00)  T(F): 97.4 (14 Oct 2018 14:00), Max: 97.4 (14 Oct 2018 14:00)  HR: 73 (14 Oct 2018 14:00) (60 - 73)  BP: 172/88 (14 Oct 2018 14:00) (167/90 - 188/95)  BP(mean): --  RR: 16 (14 Oct 2018 14:00) (16 - 16)  SpO2: --        PHYSICAL EXAM:  GENERAL: NAD, chronically ill appearing  NERVOUS SYSTEM:  Alert & Oriented, Good concentration  CHEST/LUNG: Clear to auscultation bilaterally; No rales, rhonchi, wheezing, or rubs  HEART: Regular rate and rhythm; No murmurs, rubs, or gallops  ABDOMEN: Soft, Nontender  EXTREMITIES:  BL No clubbing, cyanosis, or edema BLE. he is able to  bend both knees and has an improved range of motion in the BL lower etremities        DATA:      Culture - Urine (collected 10 Oct 2018 11:49)  Source: .Urine Catheterized  Final Report (11 Oct 2018 17:25):    No growth                          14.2   13.34 )-----------( 179      ( 14 Oct 2018 09:02 )             41.8     10-14    140  |  101  |  14  ----------------------------<  75  5.0   |  24  |  0.8    Ca    8.4<L>      14 Oct 2018 09:02  Mg     1.8     10-14      Urinalysis Basic - ( 14 Oct 2018 17:11 )    Color: Yellow / Appearance: Cloudy / S.020 / pH: x  Gluc: x / Ketone: Trace  / Bili: Negative / Urobili: 1.0 mg/dL   Blood: x / Protein: 100 mg/dL / Nitrite: Positive   Leuk Esterase: Small / RBC: 10-25 /HPF / WBC 26-50 /HPF   Sq Epi: x / Non Sq Epi: Negative / Bacteria: Many CC: f/u gout      INTERVAL EVENTS INCLUDE:  received clonidine x1 overnight for uncontrolled HTN  seen by neuro who advised EEG is non-epileptogenic  spoke to sister, who now tells me she is concerned for confusion, though she was not as concerned for this yesterday  noted with incontinence today, sent off a UA which showed UTI        ROS:  tells me knee pain is about the same  no abd pain, fevers or chills  RN tells me he is more lucind and ambulating better today      Vital Signs Last 24 Hrs  Vital Signs Last 24 Hrs  T(C): 36.3 (14 Oct 2018 14:00), Max: 36.3 (14 Oct 2018 14:00)  T(F): 97.4 (14 Oct 2018 14:00), Max: 97.4 (14 Oct 2018 14:00)  HR: 73 (14 Oct 2018 14:00) (60 - 73)  BP: 172/88 (14 Oct 2018 14:00) (167/90 - 188/95)  BP(mean): --  RR: 16 (14 Oct 2018 14:00) (16 - 16)  SpO2: --        PHYSICAL EXAM:  GENERAL: NAD, chronically ill appearing.   NERVOUS SYSTEM:  Alert & Oriented to self and place "[facility]", but not to time. gets frustrated when he cannot recall the date  CHEST/LUNG: Clear to auscultation bilaterally; No rales, rhonchi, wheezing, or rubs  HEART: Regular rate and rhythm; No murmurs, rubs, or gallops  ABDOMEN: Soft, Nontender. no suprapubic or CVA pain BL. no distended bladder  EXTREMITIES:  BL No clubbing, cyanosis, or edema BLE. he is able to  bend both knee. knees are not hot or red        DATA:      Culture - Urine (collected 10 Oct 2018 11:49)  Source: .Urine Catheterized  Final Report (11 Oct 2018 17:25):    No growth                          14.2   13.34 )-----------( 179      ( 14 Oct 2018 09:02 )             41.8     10-14    140  |  101  |  14  ----------------------------<  75  5.0   |  24  |  0.8    Ca    8.4<L>      14 Oct 2018 09:02  Mg     1.8     10-14      Urinalysis Basic - ( 14 Oct 2018 17:11 )    Color: Yellow / Appearance: Cloudy / S.020 / pH: x  Gluc: x / Ketone: Trace  / Bili: Negative / Urobili: 1.0 mg/dL   Blood: x / Protein: 100 mg/dL / Nitrite: Positive   Leuk Esterase: Small / RBC: 10-25 /HPF / WBC 26-50 /HPF   Sq Epi: x / Non Sq Epi: Negative / Bacteria: Many

## 2018-10-14 NOTE — PROGRESS NOTE ADULT - ASSESSMENT
69 y/o Male with PMH that includes HTN, Parkinson, DM, CVA, gout, recurrent falls. He was brought in by caregiver with a history of being found down. Patient had apparently sustained a fall on Monday night and was unable to able to get up thus remained on the floor until he was found 2 days later. Patient on presentation complained of left shoulder and left knee pain. Much history could not be obtained due to h/o CVA with expressive aphasia. At baseline he ambulates with walker but has been having recurrent falls. Trauma work up was negative but x-ray showed widening of the acromioclavicular joint to 1.3 cm, which was evaluated by orthopedics. He was admitted for further evaluation.    Relative/caregiver- Ms Caroline Alegre.   "sister", who is close friend and  Yarelis [phone]. she is health care proxy per her and she will bring a copy      # s/p Fall: with parkinson's disease s/p seen by neurology: Falls most likely multifactorial, DM neuropathy, PD, ? Seizures and h/o CVA.  - neuro followup appreciated given abnormal  - PT / PMR recommends subacute rehab. but HCP strongly feels he needs acute rehab in [address] [place] [address]. they will discuss further with PMR MD and with CM  - family is concerned for underlying depression, without SI or HI. as such they will arrange an outpatient psych followup for him  - outpatient EMG rec by neuro      # incontinence noted today: UA shows UTI.   - add aztreonam given allergy profile. (reaction are not known)  - HCP will visit tomorrow to comment on his mental status      # Parkinsons:   - CONT SINEMET 25/100 AND COMTAN 4x per day      # Gout: seen by ortho for this. of note ortho not concerned of AC joint, amna has DJD  - Continue prednisone.      # h/o CVA:  - Continue Aspirin and Statin.  - add norvasc qhs for improved BP control      # Coordination of care:  - DVT prophylaxis: Heparin  - plan of care reviewed with RN. spoke to HCP Yarelis [phone]   - overall prgnosis is gaurded as with muliplt incurable illnesses which predispose to fall and ongoing cognitive decline. this was discussed extensively with HCP today. Her goal is to get him in acute rehab 67 y/o Male with PMH that includes HTN, Parkinson, DM, CVA, gout, recurrent falls. He was brought in by caregiver with a history of being found down. Patient had apparently sustained a fall on Monday night and was unable to able to get up thus remained on the floor until he was found 2 days later. Patient on presentation complained of left shoulder and left knee pain. Much history could not be obtained due to h/o CVA with expressive aphasia. At baseline he ambulates with walker but has been having recurrent falls. Trauma work up was negative but x-ray showed widening of the acromioclavicular joint to 1.3 cm, which was evaluated by orthopedics. He was admitted for further evaluation.    Relative/caregiver- Ms Caroline Alegre.   "sister", who is close friend and  Suri [phone]. she is health care proxy per her and she will bring a copy. patient confirms her verbally as HCP      # s/p Fall: with parkinson's disease s/p seen by neurology: Falls most likely multifactorial, DM neuropathy, PD, and h/o CVA.  - neuro followup appreciated given abnormal EEG  - PT / PMR recommends subacute rehab. but HCP strongly feels he needs acute rehab in [address] [place] [address]. they will discuss further with PMR MD and with CM  - HCP is concerned for underlying depression, without SI or HI. as such they will arrange an outpatient psych followup for him  - outpatient EMG rec by neuro      # incontinence noted today: UA shows UTI.   - add aztreonam given allergy profile. (reaction are not known)  - HCP will visit tomorrow to comment on his mental status, though per staff this is improving  - would treat as a complicated UTI and consult ID given recent rios catheter      # Parkinsons:   - CONT SINEMET 25/100   - cont COMTAN 4x per day      # Gout: seen by ortho for this. of note ortho not concerned of AC joint, thiks has DJD  - Continue prednisone.      # h/o CVA:  - Continue Aspirin and Statin.  - add norvasc qhs for improved BP control      # Coordination of care:  - DVT prophylaxis: Heparin  - plan of care reviewed with RN. spoke to HCP Suri [phone]   - overall prognosis is gaurded as with muliplt incurable illnesses which predispose to fall and ongoing cognitive decline. this was discussed extensively with HCP today. Her goal is to get him in acute rehab 69 y/o Male with PMH that includes HTN, Parkinson, DM, CVA, gout, recurrent falls. He was brought in by caregiver with a history of being found down. Patient had apparently sustained a fall on Monday night and was unable to able to get up thus remained on the floor until he was found 2 days later. Patient on presentation complained of left shoulder and left knee pain. Much history could not be obtained due to h/o CVA with expressive aphasia. At baseline he ambulates with walker but has been having recurrent falls. Trauma work up was negative but x-ray showed widening of the acromioclavicular joint to 1.3 cm, which was evaluated by orthopedics. He was admitted for further evaluation.    Relative/caregiver- Ms Caroline Alegre.   "sister", who is close friend and  Suri [phone]. she is health care proxy per her and she will bring a copy. patient confirms her verbally as HCP      # s/p Fall: with parkinson's disease s/p seen by neurology: Falls most likely multifactorial, DM neuropathy, PD, and h/o CVA.  - neuro followup appreciated given abnormal EEG  - PT / PMR recommends subacute rehab. but HCP strongly feels he needs acute rehab in [address] [place] [address]. they will discuss further with PMR MD and with CM  - HCP is concerned for underlying depression, without SI or HI. as such they will arrange an outpatient psych followup for him  - outpatient EMG rec by neuro      # incontinence noted today: UA shows UTI.   - add aztreonam given allergy profile. (reaction are not known)  - HCP will visit tomorrow to comment on his mental status, though per staff this is improving  - would treat as a complicated UTI and consult ID given recent rios catheter      # Parkinsons:   - CONT SINEMET 25/100   - cont COMTAN 4x per day      # Gout: seen by ortho for this. of note ortho not concerned of AC joint, thiks has DJD  - Continue prednisone.      # h/o CVA:  - Continue Aspirin and Statin.  - add norvasc qhs for improved BP control      # Coordination of care:  - DVT prophylaxis: LMWH  - plan of care reviewed with RN. spoke to HCP Suri [phone]   - overall prognosis is gaurded as with muliplt incurable illnesses which predispose to fall and ongoing cognitive decline. this was discussed extensively with HCP today. Her goal is to get him in acute rehab

## 2018-10-15 LAB
ALBUMIN SERPL ELPH-MCNC: 3.1 G/DL — LOW (ref 3.5–5.2)
ALP SERPL-CCNC: 93 U/L — SIGNIFICANT CHANGE UP (ref 30–115)
ALT FLD-CCNC: 5 U/L — SIGNIFICANT CHANGE UP (ref 0–41)
ANION GAP SERPL CALC-SCNC: 16 MMOL/L — HIGH (ref 7–14)
AST SERPL-CCNC: 14 U/L — SIGNIFICANT CHANGE UP (ref 0–41)
BILIRUB DIRECT SERPL-MCNC: 0.2 MG/DL — SIGNIFICANT CHANGE UP (ref 0–0.2)
BILIRUB INDIRECT FLD-MCNC: 0.6 MG/DL — SIGNIFICANT CHANGE UP (ref 0.2–1.2)
BILIRUB SERPL-MCNC: 0.8 MG/DL — SIGNIFICANT CHANGE UP (ref 0.2–1.2)
BUN SERPL-MCNC: 14 MG/DL — SIGNIFICANT CHANGE UP (ref 10–20)
CALCIUM SERPL-MCNC: 8.5 MG/DL — SIGNIFICANT CHANGE UP (ref 8.5–10.1)
CHLORIDE SERPL-SCNC: 97 MMOL/L — LOW (ref 98–110)
CO2 SERPL-SCNC: 27 MMOL/L — SIGNIFICANT CHANGE UP (ref 17–32)
CREAT SERPL-MCNC: 0.8 MG/DL — SIGNIFICANT CHANGE UP (ref 0.7–1.5)
GLUCOSE BLDC GLUCOMTR-MCNC: 101 MG/DL — HIGH (ref 70–99)
GLUCOSE BLDC GLUCOMTR-MCNC: 137 MG/DL — HIGH (ref 70–99)
GLUCOSE BLDC GLUCOMTR-MCNC: 161 MG/DL — HIGH (ref 70–99)
GLUCOSE BLDC GLUCOMTR-MCNC: 209 MG/DL — HIGH (ref 70–99)
GLUCOSE BLDC GLUCOMTR-MCNC: 241 MG/DL — HIGH (ref 70–99)
GLUCOSE SERPL-MCNC: 113 MG/DL — HIGH (ref 70–99)
HCT VFR BLD CALC: 42.8 % — SIGNIFICANT CHANGE UP (ref 42–52)
HGB BLD-MCNC: 14.6 G/DL — SIGNIFICANT CHANGE UP (ref 14–18)
MAGNESIUM SERPL-MCNC: 1.9 MG/DL — SIGNIFICANT CHANGE UP (ref 1.8–2.4)
MCHC RBC-ENTMCNC: 31.7 PG — HIGH (ref 27–31)
MCHC RBC-ENTMCNC: 34.1 G/DL — SIGNIFICANT CHANGE UP (ref 32–37)
MCV RBC AUTO: 92.8 FL — SIGNIFICANT CHANGE UP (ref 80–94)
NRBC # BLD: 0 /100 WBCS — SIGNIFICANT CHANGE UP (ref 0–0)
PLATELET # BLD AUTO: 179 K/UL — SIGNIFICANT CHANGE UP (ref 130–400)
POTASSIUM SERPL-MCNC: 4.1 MMOL/L — SIGNIFICANT CHANGE UP (ref 3.5–5)
POTASSIUM SERPL-SCNC: 4.1 MMOL/L — SIGNIFICANT CHANGE UP (ref 3.5–5)
PROT SERPL-MCNC: 5.7 G/DL — LOW (ref 6–8)
RBC # BLD: 4.61 M/UL — LOW (ref 4.7–6.1)
RBC # FLD: 12.5 % — SIGNIFICANT CHANGE UP (ref 11.5–14.5)
SODIUM SERPL-SCNC: 140 MMOL/L — SIGNIFICANT CHANGE UP (ref 135–146)
WBC # BLD: 19.79 K/UL — HIGH (ref 4.8–10.8)
WBC # FLD AUTO: 19.79 K/UL — HIGH (ref 4.8–10.8)

## 2018-10-15 RX ORDER — AMLODIPINE BESYLATE 2.5 MG/1
2.5 TABLET ORAL ONCE
Qty: 0 | Refills: 0 | Status: DISCONTINUED | OUTPATIENT
Start: 2018-10-15 | End: 2018-10-15

## 2018-10-15 RX ADMIN — AMLODIPINE BESYLATE 5 MILLIGRAM(S): 2.5 TABLET ORAL at 21:02

## 2018-10-15 RX ADMIN — CARVEDILOL PHOSPHATE 12.5 MILLIGRAM(S): 80 CAPSULE, EXTENDED RELEASE ORAL at 18:16

## 2018-10-15 RX ADMIN — METFORMIN HYDROCHLORIDE 500 MILLIGRAM(S): 850 TABLET ORAL at 18:17

## 2018-10-15 RX ADMIN — ENTACAPONE 200 MILLIGRAM(S): 200 TABLET, FILM COATED ORAL at 06:21

## 2018-10-15 RX ADMIN — CARBIDOPA AND LEVODOPA 1 TABLET(S): 25; 100 TABLET ORAL at 06:21

## 2018-10-15 RX ADMIN — Medication 650 MILLIGRAM(S): at 02:18

## 2018-10-15 RX ADMIN — MAGNESIUM OXIDE 400 MG ORAL TABLET 400 MILLIGRAM(S): 241.3 TABLET ORAL at 11:33

## 2018-10-15 RX ADMIN — Medication 650 MILLIGRAM(S): at 02:16

## 2018-10-15 RX ADMIN — Medication 50 MILLIGRAM(S): at 06:20

## 2018-10-15 RX ADMIN — MAGNESIUM OXIDE 400 MG ORAL TABLET 400 MILLIGRAM(S): 241.3 TABLET ORAL at 13:49

## 2018-10-15 RX ADMIN — CARBIDOPA AND LEVODOPA 1 TABLET(S): 25; 100 TABLET ORAL at 18:17

## 2018-10-15 RX ADMIN — METFORMIN HYDROCHLORIDE 500 MILLIGRAM(S): 850 TABLET ORAL at 06:20

## 2018-10-15 RX ADMIN — ENOXAPARIN SODIUM 40 MILLIGRAM(S): 100 INJECTION SUBCUTANEOUS at 21:02

## 2018-10-15 RX ADMIN — Medication 2: at 16:34

## 2018-10-15 RX ADMIN — ENTACAPONE 200 MILLIGRAM(S): 200 TABLET, FILM COATED ORAL at 18:17

## 2018-10-15 RX ADMIN — ATORVASTATIN CALCIUM 40 MILLIGRAM(S): 80 TABLET, FILM COATED ORAL at 21:02

## 2018-10-15 RX ADMIN — LISINOPRIL 20 MILLIGRAM(S): 2.5 TABLET ORAL at 03:56

## 2018-10-15 RX ADMIN — ESCITALOPRAM OXALATE 10 MILLIGRAM(S): 10 TABLET, FILM COATED ORAL at 11:32

## 2018-10-15 RX ADMIN — CARBIDOPA AND LEVODOPA 1 TABLET(S): 25; 100 TABLET ORAL at 11:32

## 2018-10-15 RX ADMIN — Medication 2: at 12:36

## 2018-10-15 RX ADMIN — Medication 40 MILLIGRAM(S): at 06:20

## 2018-10-15 RX ADMIN — Medication 50 MILLIGRAM(S): at 21:02

## 2018-10-15 RX ADMIN — Medication 50 MILLIGRAM(S): at 13:49

## 2018-10-15 RX ADMIN — CARVEDILOL PHOSPHATE 12.5 MILLIGRAM(S): 80 CAPSULE, EXTENDED RELEASE ORAL at 03:56

## 2018-10-15 RX ADMIN — MAGNESIUM OXIDE 400 MG ORAL TABLET 400 MILLIGRAM(S): 241.3 TABLET ORAL at 18:17

## 2018-10-15 RX ADMIN — Medication 81 MILLIGRAM(S): at 11:32

## 2018-10-15 RX ADMIN — ENTACAPONE 200 MILLIGRAM(S): 200 TABLET, FILM COATED ORAL at 11:32

## 2018-10-15 NOTE — PROGRESS NOTE ADULT - ASSESSMENT
67 y/o Male with PMH that includes HTN, Parkinson, DM, CVA, gout, recurrent falls. He was brought in by caregiver with a history of being found down. Patient had apparently sustained a fall on Monday night and was unable to able to get up thus remained on the floor until he was found 2 days later. Patient on presentation complained of left shoulder and left knee pain. Much history could not be obtained due to h/o CVA with expressive aphasia. At baseline he ambulates with walker but has been having recurrent falls. Trauma work up was negative but x-ray showed widening of the acromioclavicular joint to 1.3 cm, which was evaluated by orthopedics. He was admitted for further evaluation.    Relative/caregiver - Close friend and  Suri [phone]. she is health care proxy per patient       # s/p Fall:  - with parkinson's disease: Falls most likely multifactorial, DM neuropathy, PD, and h/o CVA.  - neuro followup appreciated    - PT / PMR recommends subacute rehab. Not a candidate for 4A.   - HCP is concerned for underlying depression, without SI or HI. As such they will arrange an outpatient psych followup for him  - outpatient EMG rec by neuro      # Complicated UTI  - c/w aztreonam    - f/u urine culture       # Parkinson's   - CONT SINEMET 25/100   - cont COMTAN 4x per day      # Gout: improved  - Stop prednisone.      # h/o CVA:  - Continue Aspirin and Statin.  - c/w Norvasc 5mg qhs for improved BP control      # Coordination of care:  - DVT prophylaxis: LMWH 67 yo Male with PMH that includes HTN, Parkinson, DM, CVA, gout, recurrent falls. He was brought in by caregiver with a history of being found down. Patient had apparently sustained a fall on Monday night and was unable to able to get up thus remained on the floor until he was found 2 days later. Patient on presentation complained of left shoulder and left knee pain. Much history could not be obtained due to h/o CVA with expressive aphasia. At baseline he ambulates with walker but has been having recurrent falls. Trauma work up was negative but x-ray showed widening of the acromioclavicular joint to 1.3 cm, which was evaluated by orthopedics. He was admitted for further evaluation.    Relative/caregiver - Close friend and  Suri [phone]. she is health care proxy per patient      # Complicated UTI  - c/w aztreonam    - f/u urine culture  - ID f/u      # Leukocytosis  - Uptrending white count with temp of 100.8 overnight, possibly from UTI since antibiotics were just started yesterday. Also on prednisone which could be contributing to the leukocytosis.   - f/u repeat CBC with differential, ESR, CRP   - c/w aztreonam for now   - ID f/u 	  - if spikes again, will panculture     # s/p Fall:  - Falls most likely multifactorial, DM neuropathy, PD, and h/o CVA with parkinson's disease:   - PT / PMR recommends subacute rehab. Not a candidate for 4A.   - outpatient EMG rec by neuro  - eventually will need STR placement     # Parkinson's   - C/w SINEMET 25/100   - C/w COMTAN 4x per day      # Gout: improved  - Stop prednisone. S/p 5 doses   - history of more than one attacks, needs allopurinol or febuxostat for urate lower therapy on discharge     # h/o CVA:  - Continue Aspirin and Statin.  - c/w Norvasc 5mg qhs        # Coordination of care:  - DVT prophylaxis: LMWH

## 2018-10-15 NOTE — CONSULT NOTE ADULT - ASSESSMENT
IMPRESSION: Rehab of 69 y/o  m  rehab  for  decline  pk  dis  hx  of  cva  sp  fall  gd      PRECAUTIONS: [  ] Cardiac  [  ] Respiratory  [  ] Seizures [  ] Contact Isolation  [  ] Droplet Isolation  [ fall ] Other    Weight Bearing Status:     RECOMMENDATION:    Out of Bed to Chair     DVT/Decubiti Prophylaxis    REHAB PLAN:     [   xx] Bedside P/T 3-5 times a week   [   ]   Bedside O/T  2-3 times a week             [   ] No Rehab Therapy Indicated                   [   ]  Speech Therapy   Conditioning/ROM                                    ADL  Bed Mobility                                               Conditioning/ROM  Transfers                                                     Bed Mobility  Sitting /Standing Balance                         Transfers                                        Gait Training                                               Sitting/Standing Balance  Stair Training [   ]Applicable                    Home equipment Eval                                                                        Splinting  [   ] Only      GOALS:   ADL   [ x  ]   Independent                    Transfers  [  x ] Independent                          Ambulation  [  x ] Independent     [ x   ] With device                            [   ]  CG                                                         [   ]  CG                                                                  [   ] CG                            [    ] Min A                                                   [   ] Min A                                                              [   ] Min  A          DISCHARGE PLAN:   [   ]  Good candidate for Intensive Rehabilitation/Hospital based-4A [facility]                                             Will tolerate 3hrs Intensive Rehab Daily                                       [ xx   ]  Short Term Rehab in Skilled Nursing Facility                                       [    ]  Home with Outpatient or VN services                                         [    ]  Possible Candidate for Intensive Hospital based Rehab
upon questioning patient.  He did have a shrimp meal prior to developing the pain     I believe he has a gout attack     I would treat for gout    diet restrictions for gout     and he needs to be hydrated
# Sepsis  ( Fever + Leukocytosis )  # UTI    Would recommend:    1. Follow up  blood culture and adjust antibiotic accordingly  2. Follow up final Urine culture  3. Continue Aztreonam until cultures are finalized  4. Monitor WBC count  5. Monitor Temp. and C/e supportive care    d/w Patient     will follow the patient with you and make further recommendation based on the clinical course and Lab results  Thank you for the opportunity to participate in Mr. MILLS's care

## 2018-10-15 NOTE — PROGRESS NOTE ADULT - SUBJECTIVE AND OBJECTIVE BOX
Pt seen and examined at bedside. Feels much better. Back to baseline mental status. Reports he lives alone, uses a walker to ambulate.     VITAL SIGNS (Last 24 hrs):  T(C): 36.4 (10-15-18 @ 06:01), Max: 38.2 (10-15-18 @ 02:00)  HR: 80 (10-15-18 @ 06:01) (68 - 107)  BP: 126/77 (10-15-18 @ 06:01) (126/77 - 190/98)  RR: 16 (10-15-18 @ 06:01) (16 - 16)  SpO2: 96% (10-14-18 @ 23:54) (96% - 96%)  Wt(kg): --  Daily     Daily         PHYSICAL EXAM:  GENERAL: NAD   HEAD:  Atraumatic, Normocephalic  EYES: EOMI, PERRLA, conjunctiva and sclera clear  NECK: Supple, No JVD  CHEST/LUNG: Clear to auscultation bilaterally; No wheeze  HEART: Regular rate and rhythm; No murmurs, rubs, or gallops  ABDOMEN: Soft, Nontender, Nondistended; Bowel sounds present  EXTREMITIES:  2+ Peripheral Pulses, No clubbing, cyanosis, or edema  NEUROLOGY: non-focal  SKIN: No rashes or lesions    Labs Reviewed  Spoke to patient in regards to abnormal labs.    CBC Full  -  ( 15 Oct 2018 07:01 )  WBC Count : 19.79 K/uL  Hemoglobin : 14.6 g/dL  Hematocrit : 42.8 %  Platelet Count - Automated : 179 K/uL  Mean Cell Volume : 92.8 fL  Mean Cell Hemoglobin : 31.7 pg  Mean Cell Hemoglobin Concentration : 34.1 g/dL  Auto Neutrophil # : x  Auto Lymphocyte # : x  Auto Monocyte # : x  Auto Eosinophil # : x  Auto Basophil # : x  Auto Neutrophil % : x  Auto Lymphocyte % : x  Auto Monocyte % : x  Auto Eosinophil % : x  Auto Basophil % : x    BMP:    10-15 @ 07:01    Blood Urea Nitrogen - 14  Calcium - 8.5  Carbon Dioxide - 27  Chloride - 97  Creatinine - 0.8  Glucose - 113  Potassium - 4.1  Sodium - 140     MEDICATIONS  (STANDING):  amLODIPine   Tablet 5 milliGRAM(s) Oral at bedtime  aspirin enteric coated 81 milliGRAM(s) Oral daily  atorvastatin 40 milliGRAM(s) Oral at bedtime  aztreonam  IVPB      aztreonam  IVPB 1000 milliGRAM(s) IV Intermittent every 8 hours  carbidopa/levodopa  25/100 1 Tablet(s) Oral four times a day  carvedilol 12.5 milliGRAM(s) Oral every 12 hours  dextrose 5%. 1000 milliLiter(s) (50 mL/Hr) IV Continuous <Continuous>  dextrose 50% Injectable 12.5 Gram(s) IV Push once  dextrose 50% Injectable 25 Gram(s) IV Push once  dextrose 50% Injectable 25 Gram(s) IV Push once  enoxaparin Injectable 40 milliGRAM(s) SubCutaneous every 24 hours  entacapone 200 milliGRAM(s) Oral four times a day  escitalopram 10 milliGRAM(s) Oral daily  insulin lispro (HumaLOG) corrective regimen sliding scale   SubCutaneous three times a day before meals  lisinopril 20 milliGRAM(s) Oral daily  magnesium oxide 400 milliGRAM(s) Oral three times a day with meals  metFORMIN 500 milliGRAM(s) Oral two times a day  morphine  - Injectable 4 milliGRAM(s) IV Push once  predniSONE   Tablet 40 milliGRAM(s) Oral daily    MEDICATIONS  (PRN):  acetaminophen   Tablet .. 650 milliGRAM(s) Oral every 6 hours PRN Temp greater or equal to 38C (100.4F), Moderate Pain (4 - 6)  dextrose 40% Gel 15 Gram(s) Oral once PRN Blood Glucose LESS THAN 70 milliGRAM(s)/deciliter  docusate sodium 100 milliGRAM(s) Oral three times a day PRN Constipation  glucagon  Injectable 1 milliGRAM(s) IntraMuscular once PRN Glucose LESS THAN 70 milligrams/deciliter  oxyCODONE    5 mG/acetaminophen 325 mG 1 Tablet(s) Oral every 6 hours PRN Severe Pain (7 - 10) Pt seen and examined at bedside. Feels much better. Back to baseline mental status. Reports he lives alone, uses a walker to ambulate.     VITAL SIGNS (Last 24 hrs):  T(C): 36.4 (10-15-18 @ 06:01), Max: 38.2 (10-15-18 @ 02:00)  HR: 80 (10-15-18 @ 06:01) (68 - 107)  BP: 126/77 (10-15-18 @ 06:01) (126/77 - 190/98)  RR: 16 (10-15-18 @ 06:01) (16 - 16)  SpO2: 96% (10-14-18 @ 23:54) (96% - 96%)  Wt(kg): --  Daily     Daily         PHYSICAL EXAM:  GENERAL: NAD   HEAD:  Atraumatic, Normocephalic  EYES: conjunctiva and sclera clear  NECK: Supple, No JVD  CHEST/LUNG: Clear to auscultation bilaterally; No wheeze  HEART: Regular rate and rhythm; No murmurs, rubs, or gallops  ABDOMEN: Soft, Nontender, Nondistended; Bowel sounds present  EXTREMITIES:  2+ Peripheral Pulses, No clubbing, cyanosis, or edema  NEUROLOGY: non-focal  SKIN: No rashes or lesions    Labs Reviewed  Spoke to patient in regards to abnormal labs.    CBC Full  -  ( 15 Oct 2018 07:01 )  WBC Count : 19.79 K/uL  Hemoglobin : 14.6 g/dL  Hematocrit : 42.8 %  Platelet Count - Automated : 179 K/uL  Mean Cell Volume : 92.8 fL  Mean Cell Hemoglobin : 31.7 pg  Mean Cell Hemoglobin Concentration : 34.1 g/dL  Auto Neutrophil # : x  Auto Lymphocyte # : x  Auto Monocyte # : x  Auto Eosinophil # : x  Auto Basophil # : x  Auto Neutrophil % : x  Auto Lymphocyte % : x  Auto Monocyte % : x  Auto Eosinophil % : x  Auto Basophil % : x    BMP:    10-15 @ 07:01    Blood Urea Nitrogen - 14  Calcium - 8.5  Carbon Dioxide - 27  Chloride - 97  Creatinine - 0.8  Glucose - 113  Potassium - 4.1  Sodium - 140     MEDICATIONS  (STANDING):  amLODIPine   Tablet 5 milliGRAM(s) Oral at bedtime  aspirin enteric coated 81 milliGRAM(s) Oral daily  atorvastatin 40 milliGRAM(s) Oral at bedtime  aztreonam  IVPB      aztreonam  IVPB 1000 milliGRAM(s) IV Intermittent every 8 hours  carbidopa/levodopa  25/100 1 Tablet(s) Oral four times a day  carvedilol 12.5 milliGRAM(s) Oral every 12 hours  dextrose 5%. 1000 milliLiter(s) (50 mL/Hr) IV Continuous <Continuous>  dextrose 50% Injectable 12.5 Gram(s) IV Push once  dextrose 50% Injectable 25 Gram(s) IV Push once  dextrose 50% Injectable 25 Gram(s) IV Push once  enoxaparin Injectable 40 milliGRAM(s) SubCutaneous every 24 hours  entacapone 200 milliGRAM(s) Oral four times a day  escitalopram 10 milliGRAM(s) Oral daily  insulin lispro (HumaLOG) corrective regimen sliding scale   SubCutaneous three times a day before meals  lisinopril 20 milliGRAM(s) Oral daily  magnesium oxide 400 milliGRAM(s) Oral three times a day with meals  metFORMIN 500 milliGRAM(s) Oral two times a day  morphine  - Injectable 4 milliGRAM(s) IV Push once  predniSONE   Tablet 40 milliGRAM(s) Oral daily    MEDICATIONS  (PRN):  acetaminophen   Tablet .. 650 milliGRAM(s) Oral every 6 hours PRN Temp greater or equal to 38C (100.4F), Moderate Pain (4 - 6)  dextrose 40% Gel 15 Gram(s) Oral once PRN Blood Glucose LESS THAN 70 milliGRAM(s)/deciliter  docusate sodium 100 milliGRAM(s) Oral three times a day PRN Constipation  glucagon  Injectable 1 milliGRAM(s) IntraMuscular once PRN Glucose LESS THAN 70 milligrams/deciliter  oxyCODONE    5 mG/acetaminophen 325 mG 1 Tablet(s) Oral every 6 hours PRN Severe Pain (7 - 10)

## 2018-10-15 NOTE — CONSULT NOTE ADULT - SUBJECTIVE AND OBJECTIVE BOX
Patient is a 68y old  Male who presents with a chief complaint of fall (14 Oct 2018 18:50)      INTERVAL HPI/OVERNIGHT EVENTS:  T(C): 35.9 (10-15-18 @ 14:25), Max: 38.2 (10-15-18 @ 02:00)  HR: 76 (10-15-18 @ 14:25) (68 - 107)  BP: 145/84 (10-15-18 @ 14:25) (126/77 - 190/98)  RR: 16 (10-15-18 @ 14:25) (16 - 16)  SpO2: 96% (10-14-18 @ 23:54) (96% - 96%)  Wt(kg): --  I&O's Summary      PAST MEDICAL & SURGICAL HISTORY:  Gout  Diabetes mellitus  HTN (hypertension)  CVA (cerebral vascular accident)  Parkinson disease  No significant past surgical history      SOCIAL HISTORY  Alcohol:  Tobacco:  Illicit substance use:      FAMILY HISTORY:      LABS:                        14.6   19.79 )-----------( 179      ( 15 Oct 2018 07:01 )             42.8     10-15    140  |  97<L>  |  14  ----------------------------<  113<H>  4.1   |  27  |  0.8    Ca    8.5      15 Oct 2018 07:01  Mg     1.9     10-15    TPro  5.7<L>  /  Alb  3.1<L>  /  TBili  0.8  /  DBili  0.2  /  AST  14  /  ALT  5   /  AlkPhos  93  10-15      Urinalysis Basic - ( 14 Oct 2018 17:11 )    Color: Yellow / Appearance: Cloudy / S.020 / pH: x  Gluc: x / Ketone: Trace  / Bili: Negative / Urobili: 1.0 mg/dL   Blood: x / Protein: 100 mg/dL / Nitrite: Positive   Leuk Esterase: Small / RBC: 10-25 /HPF / WBC 26-50 /HPF   Sq Epi: x / Non Sq Epi: Negative / Bacteria: Many      CAPILLARY BLOOD GLUCOSE      POCT Blood Glucose.: 209 mg/dL (15 Oct 2018 16:13)  POCT Blood Glucose.: 241 mg/dL (15 Oct 2018 12:07)  POCT Blood Glucose.: 137 mg/dL (15 Oct 2018 07:36)  POCT Blood Glucose.: 162 mg/dL (14 Oct 2018 21:29)        Urinalysis Basic - ( 14 Oct 2018 17:11 )    Color: Yellow / Appearance: Cloudy / S.020 / pH: x  Gluc: x / Ketone: Trace  / Bili: Negative / Urobili: 1.0 mg/dL   Blood: x / Protein: 100 mg/dL / Nitrite: Positive   Leuk Esterase: Small / RBC: 10-25 /HPF / WBC 26-50 /HPF   Sq Epi: x / Non Sq Epi: Negative / Bacteria: Many        MEDICATIONS  (STANDING):  amLODIPine   Tablet 5 milliGRAM(s) Oral at bedtime  aspirin enteric coated 81 milliGRAM(s) Oral daily  atorvastatin 40 milliGRAM(s) Oral at bedtime  aztreonam  IVPB      aztreonam  IVPB 1000 milliGRAM(s) IV Intermittent every 8 hours  carbidopa/levodopa  25/100 1 Tablet(s) Oral four times a day  carvedilol 12.5 milliGRAM(s) Oral every 12 hours  dextrose 5%. 1000 milliLiter(s) (50 mL/Hr) IV Continuous <Continuous>  dextrose 50% Injectable 12.5 Gram(s) IV Push once  dextrose 50% Injectable 25 Gram(s) IV Push once  dextrose 50% Injectable 25 Gram(s) IV Push once  enoxaparin Injectable 40 milliGRAM(s) SubCutaneous every 24 hours  entacapone 200 milliGRAM(s) Oral four times a day  escitalopram 10 milliGRAM(s) Oral daily  insulin lispro (HumaLOG) corrective regimen sliding scale   SubCutaneous three times a day before meals  lisinopril 20 milliGRAM(s) Oral daily  magnesium oxide 400 milliGRAM(s) Oral three times a day with meals  metFORMIN 500 milliGRAM(s) Oral two times a day  morphine  - Injectable 4 milliGRAM(s) IV Push once    MEDICATIONS  (PRN):  acetaminophen   Tablet .. 650 milliGRAM(s) Oral every 6 hours PRN Temp greater or equal to 38C (100.4F), Moderate Pain (4 - 6)  dextrose 40% Gel 15 Gram(s) Oral once PRN Blood Glucose LESS THAN 70 milliGRAM(s)/deciliter  docusate sodium 100 milliGRAM(s) Oral three times a day PRN Constipation  glucagon  Injectable 1 milliGRAM(s) IntraMuscular once PRN Glucose LESS THAN 70 milligrams/deciliter  oxyCODONE    5 mG/acetaminophen 325 mG 1 Tablet(s) Oral every 6 hours PRN Severe Pain (7 - 10)      REVIEW OF SYSTEMS:  CONSTITUTIONAL: No fever, weight loss, or fatigue  EYES: No eye pain, visual disturbances, or discharge  ENMT:  No difficulty hearing, tinnitus, vertigo; No sinus or throat pain  NECK: No pain or stiffness  RESPIRATORY: No cough, wheezing, chills or hemoptysis; No shortness of breath  CARDIOVASCULAR: No chest pain, palpitations, dizziness, or leg swelling  GASTROINTESTINAL: No abdominal or epigastric pain. No nausea, vomiting, or hematemesis; No diarrhea or constipation. No melena or hematochezia.  GENITOURINARY: No dysuria, frequency, hematuria, or incontinence  NEUROLOGICAL: No headaches, memory loss, loss of strength, numbness, or tremors  SKIN: No itching, burning, rashes, or lesions   LYMPH NODES: No enlarged glands  ENDOCRINE: No heat or cold intolerance; No hair loss  MUSCULOSKELETAL: No joint pain or swelling; No muscle, back, or extremity pain  PSYCHIATRIC: No depression, anxiety, mood swings, or difficulty sleeping  HEME/LYMPH: No easy bruising, or bleeding gums  ALLERY AND IMMUNOLOGIC: No hives or eczema    RADIOLOGY & ADDITIONAL TESTS:    Imaging Personally Reviewed:  [ ] YES  [ ] NO    Consultant(s) Notes Reviewed:  [ ] YES  [ ] NO    PHYSICAL EXAM:  GENERAL: NAD, well-groomed, well-developed  HEAD:  Atraumatic, Normocephalic  EYES: EOMI, PERRLA, conjunctiva and sclera clear  ENMT: No tonsillar erythema, exudates, or enlargement; Moist mucous membranes, Good dentition, No lesions  NECK: Supple, No JVD, Normal thyroid  NERVOUS SYSTEM:  Alert & Oriented X3, Good concentration; Motor Strength 5/5 B/L upper and lower extremities; DTRs 2+ intact and symmetric  CHEST/LUNG: Clear to percussion bilaterally; No rales, rhonchi, wheezing, or rubs  HEART: Regular rate and rhythm; No murmurs, rubs, or gallops  ABDOMEN: Soft, Nontender, Nondistended; Bowel sounds present  EXTREMITIES:  2+ Peripheral Pulses, No clubbing, cyanosis, or edema  LYMPH: No lymphadenopathy noted  SKIN: No rashes or lesions    Care Discussed with Consultants/Other Providers [ ] YES  [ ] NO Patient is a 68y old  Male who presents with a chief complaint of fall (14 Oct 2018 18:50)      REVIEW OF SYSTEMS: Total of twelve systems have been reviewed with patient and found to be negative unless mentioned in HPI        PAST MEDICAL & SURGICAL HISTORY:  Gout  Diabetes mellitus  HTN (hypertension)  CVA (cerebral vascular accident)  Parkinson disease  No significant past surgical history        SOCIAL HISTORY  Alcohol: Does not drink  Tobacco: Does not smoke  Illicit substance use: None        FAMILY HISTORY: Non contributory to the present illness        ALLERGIES: PCN, Sulfa and cefaclor         T(C): 35.9 (10-15-18 @ 14:25), Max: 38.2 (10-15-18 @ 02:00)  HR: 76 (10-15-18 @ 14:25) (68 - 107)  BP: 145/84 (10-15-18 @ 14:25) (126/77 - 190/98)  RR: 16 (10-15-18 @ 14:25) (16 - 16)  SpO2: 96% (10-14-18 @ 23:54) (96% - 96%)  Wt(kg): --  I&O's Summary      PHYSICAL EXAM:  GENERAL: Not in distress  CHEST/LUNG: Air entry  bilaterally  HEART: s1 and s2 present   ABDOMEN: Soft, Nontender, Nondistended; Bowel sounds present  EXTREMITIES:  No pedal  edema  CNS: Awake and alert        LABS:                        14.6   19.79 )-----------( 179      ( 15 Oct 2018 07:01 )             42.8           10-15    140  |  97<L>  |  14  ----------------------------<  113<H>  4.1   |  27  |  0.8    Ca    8.5      15 Oct 2018 07:01  Mg     1.9     10-15    TPro  5.7<L>  /  Alb  3.1<L>  /  TBili  0.8  /  DBili  0.2  /  AST  14  /  ALT  5   /  AlkPhos  93  10-15      Urinalysis Basic - ( 14 Oct 2018 17:11 )    Color: Yellow / Appearance: Cloudy / S.020 / pH: x  Gluc: x / Ketone: Trace  / Bili: Negative / Urobili: 1.0 mg/dL   Blood: x / Protein: 100 mg/dL / Nitrite: Positive   Leuk Esterase: Small / RBC: 10-25 /HPF / WBC 26-50 /HPF   Sq Epi: x / Non Sq Epi: Negative / Bacteria: Many      CAPILLARY BLOOD GLUCOSE  POCT Blood Glucose.: 209 mg/dL (15 Oct 2018 16:13)  POCT Blood Glucose.: 241 mg/dL (15 Oct 2018 12:07)  POCT Blood Glucose.: 137 mg/dL (15 Oct 2018 07:36)  POCT Blood Glucose.: 162 mg/dL (14 Oct 2018 21:29)        MEDICATIONS  (STANDING):  amLODIPine   Tablet 5 milliGRAM(s) Oral at bedtime  aspirin enteric coated 81 milliGRAM(s) Oral daily  atorvastatin 40 milliGRAM(s) Oral at bedtime  aztreonam  IVPB      aztreonam  IVPB 1000 milliGRAM(s) IV Intermittent every 8 hours  carbidopa/levodopa  25/100 1 Tablet(s) Oral four times a day  carvedilol 12.5 milliGRAM(s) Oral every 12 hours  enoxaparin Injectable 40 milliGRAM(s) SubCutaneous every 24 hours  entacapone 200 milliGRAM(s) Oral four times a day  escitalopram 10 milliGRAM(s) Oral daily  insulin lispro (HumaLOG) corrective regimen sliding scale   SubCutaneous three times a day before meals  lisinopril 20 milliGRAM(s) Oral daily  magnesium oxide 400 milliGRAM(s) Oral three times a day with meals  metFORMIN 500 milliGRAM(s) Oral two times a day  morphine  - Injectable 4 milliGRAM(s) IV Push once    MEDICATIONS  (PRN):  acetaminophen   Tablet .. 650 milliGRAM(s) Oral every 6 hours PRN Temp greater or equal to 38C (100.4F), Moderate Pain (4 - 6)  dextrose 40% Gel 15 Gram(s) Oral once PRN Blood Glucose LESS THAN 70 milliGRAM(s)/deciliter  docusate sodium 100 milliGRAM(s) Oral three times a day PRN Constipation  glucagon  Injectable 1 milliGRAM(s) IntraMuscular once PRN Glucose LESS THAN 70 milligrams/deciliter  oxyCODONE    5 mG/acetaminophen 325 mG 1 Tablet(s) Oral every 6 hours PRN Severe Pain (7 - 10)        RADIOLOGY & ADDITIONAL TESTS:    < from: VA Duplex Lower Ext Vein Scan, Bilat (10.11.18 @ 09:28) >  No evidence of deep venous thrombosis or superficial thrombophlebitis in   the bilateral lower extremities.    < end of copied text >      < from: Xray Shoulder 2 Views, Left (10.10.18 @ 13:50) >  Widening of the acromioclavicular joint to 1.3 cm, which may represent   instability. Weightbearing views may provide further information    No acute displaced fracture    There are degenerative changes of left shoulder      < end of copied text >    MICROBIOLOGY DATA:    Culture - Urine (10.14.18 @ 17:11)    Specimen Source: .Urine Clean Catch (Midstream)    Culture Results:   >100,000 CFU/ml Enterobacter cloacae/asburiae

## 2018-10-16 ENCOUNTER — TRANSCRIPTION ENCOUNTER (OUTPATIENT)
Age: 68
End: 2018-10-16

## 2018-10-16 ENCOUNTER — INPATIENT (INPATIENT)
Facility: HOSPITAL | Age: 68
LOS: 13 days | Discharge: HOME HEALTH | End: 2018-10-30
Attending: PHYSICAL MEDICINE & REHABILITATION | Admitting: PHYSICAL MEDICINE & REHABILITATION

## 2018-10-16 VITALS
HEART RATE: 66 BPM | RESPIRATION RATE: 16 BRPM | TEMPERATURE: 97 F | SYSTOLIC BLOOD PRESSURE: 128 MMHG | DIASTOLIC BLOOD PRESSURE: 78 MMHG | WEIGHT: 178.57 LBS

## 2018-10-16 DIAGNOSIS — N39.0 URINARY TRACT INFECTION, SITE NOT SPECIFIED: ICD-10-CM

## 2018-10-16 LAB
-  AMIKACIN: SIGNIFICANT CHANGE UP
-  AMOXICILLIN/CLAVULANIC ACID: SIGNIFICANT CHANGE UP
-  AMPICILLIN/SULBACTAM: SIGNIFICANT CHANGE UP
-  AMPICILLIN: SIGNIFICANT CHANGE UP
-  AZTREONAM: SIGNIFICANT CHANGE UP
-  CEFAZOLIN: SIGNIFICANT CHANGE UP
-  CEFEPIME: SIGNIFICANT CHANGE UP
-  CEFOXITIN: SIGNIFICANT CHANGE UP
-  CEFTRIAXONE: SIGNIFICANT CHANGE UP
-  CIPROFLOXACIN: SIGNIFICANT CHANGE UP
-  ERTAPENEM: SIGNIFICANT CHANGE UP
-  GENTAMICIN: SIGNIFICANT CHANGE UP
-  IMIPENEM: SIGNIFICANT CHANGE UP
-  LEVOFLOXACIN: SIGNIFICANT CHANGE UP
-  MEROPENEM: SIGNIFICANT CHANGE UP
-  NITROFURANTOIN: SIGNIFICANT CHANGE UP
-  PIPERACILLIN/TAZOBACTAM: SIGNIFICANT CHANGE UP
-  TIGECYCLINE: SIGNIFICANT CHANGE UP
-  TOBRAMYCIN: SIGNIFICANT CHANGE UP
-  TRIMETHOPRIM/SULFAMETHOXAZOLE: SIGNIFICANT CHANGE UP
BASOPHILS # BLD AUTO: 0.03 K/UL — SIGNIFICANT CHANGE UP (ref 0–0.2)
BASOPHILS NFR BLD AUTO: 0.2 % — SIGNIFICANT CHANGE UP (ref 0–1)
CULTURE RESULTS: SIGNIFICANT CHANGE UP
EOSINOPHIL # BLD AUTO: 0.08 K/UL — SIGNIFICANT CHANGE UP (ref 0–0.7)
EOSINOPHIL NFR BLD AUTO: 0.4 % — SIGNIFICANT CHANGE UP (ref 0–8)
ERYTHROCYTE [SEDIMENTATION RATE] IN BLOOD: 25 MM/HR — HIGH (ref 0–10)
GLUCOSE BLDC GLUCOMTR-MCNC: 120 MG/DL — HIGH (ref 70–99)
GLUCOSE BLDC GLUCOMTR-MCNC: 161 MG/DL — HIGH (ref 70–99)
GLUCOSE BLDC GLUCOMTR-MCNC: 97 MG/DL — SIGNIFICANT CHANGE UP (ref 70–99)
HCT VFR BLD CALC: 41.1 % — LOW (ref 42–52)
HGB BLD-MCNC: 14 G/DL — SIGNIFICANT CHANGE UP (ref 14–18)
IMM GRANULOCYTES NFR BLD AUTO: 1 % — HIGH (ref 0.1–0.3)
LYMPHOCYTES # BLD AUTO: 19.1 % — LOW (ref 20.5–51.1)
LYMPHOCYTES # BLD AUTO: 3.49 K/UL — HIGH (ref 1.2–3.4)
MCHC RBC-ENTMCNC: 31.9 PG — HIGH (ref 27–31)
MCHC RBC-ENTMCNC: 34.1 G/DL — SIGNIFICANT CHANGE UP (ref 32–37)
MCV RBC AUTO: 93.6 FL — SIGNIFICANT CHANGE UP (ref 80–94)
METHOD TYPE: SIGNIFICANT CHANGE UP
MONOCYTES # BLD AUTO: 1.24 K/UL — HIGH (ref 0.1–0.6)
MONOCYTES NFR BLD AUTO: 6.8 % — SIGNIFICANT CHANGE UP (ref 1.7–9.3)
NEUTROPHILS # BLD AUTO: 13.23 K/UL — HIGH (ref 1.4–6.5)
NEUTROPHILS NFR BLD AUTO: 72.5 % — SIGNIFICANT CHANGE UP (ref 42.2–75.2)
NRBC # BLD: 0 /100 WBCS — SIGNIFICANT CHANGE UP (ref 0–0)
ORGANISM # SPEC MICROSCOPIC CNT: SIGNIFICANT CHANGE UP
ORGANISM # SPEC MICROSCOPIC CNT: SIGNIFICANT CHANGE UP
PLATELET # BLD AUTO: 201 K/UL — SIGNIFICANT CHANGE UP (ref 130–400)
RBC # BLD: 4.39 M/UL — LOW (ref 4.7–6.1)
RBC # FLD: 12.5 % — SIGNIFICANT CHANGE UP (ref 11.5–14.5)
SPECIMEN SOURCE: SIGNIFICANT CHANGE UP
WBC # BLD: 18.26 K/UL — HIGH (ref 4.8–10.8)
WBC # FLD AUTO: 18.26 K/UL — HIGH (ref 4.8–10.8)

## 2018-10-16 RX ORDER — ESCITALOPRAM OXALATE 10 MG/1
10 TABLET, FILM COATED ORAL DAILY
Qty: 0 | Refills: 0 | Status: DISCONTINUED | OUTPATIENT
Start: 2018-10-17 | End: 2018-10-22

## 2018-10-16 RX ORDER — MAGNESIUM OXIDE 400 MG ORAL TABLET 241.3 MG
1 TABLET ORAL
Qty: 0 | Refills: 0 | COMMUNITY
Start: 2018-10-16

## 2018-10-16 RX ORDER — PANTOPRAZOLE SODIUM 20 MG/1
40 TABLET, DELAYED RELEASE ORAL DAILY
Qty: 0 | Refills: 0 | Status: DISCONTINUED | OUTPATIENT
Start: 2018-10-16 | End: 2018-10-30

## 2018-10-16 RX ORDER — RIVASTIGMINE 4.6 MG/24H
1 PATCH, EXTENDED RELEASE TRANSDERMAL EVERY 24 HOURS
Qty: 0 | Refills: 0 | Status: DISCONTINUED | OUTPATIENT
Start: 2018-10-16 | End: 2018-10-30

## 2018-10-16 RX ORDER — LISINOPRIL 2.5 MG/1
20 TABLET ORAL AT BEDTIME
Qty: 0 | Refills: 0 | Status: DISCONTINUED | OUTPATIENT
Start: 2018-10-17 | End: 2018-10-30

## 2018-10-16 RX ORDER — ACETAMINOPHEN 500 MG
650 TABLET ORAL EVERY 6 HOURS
Qty: 0 | Refills: 0 | Status: DISCONTINUED | OUTPATIENT
Start: 2018-10-16 | End: 2018-10-30

## 2018-10-16 RX ORDER — ATORVASTATIN CALCIUM 80 MG/1
40 TABLET, FILM COATED ORAL AT BEDTIME
Qty: 0 | Refills: 0 | Status: DISCONTINUED | OUTPATIENT
Start: 2018-10-16 | End: 2018-10-30

## 2018-10-16 RX ORDER — CARBIDOPA AND LEVODOPA 25; 100 MG/1; MG/1
1 TABLET ORAL
Qty: 0 | Refills: 0 | Status: DISCONTINUED | OUTPATIENT
Start: 2018-10-16 | End: 2018-10-30

## 2018-10-16 RX ORDER — CARVEDILOL PHOSPHATE 80 MG/1
12.5 CAPSULE, EXTENDED RELEASE ORAL EVERY 12 HOURS
Qty: 0 | Refills: 0 | Status: DISCONTINUED | OUTPATIENT
Start: 2018-10-16 | End: 2018-10-30

## 2018-10-16 RX ORDER — ENTACAPONE 200 MG/1
1 TABLET, FILM COATED ORAL
Qty: 0 | Refills: 0 | COMMUNITY

## 2018-10-16 RX ORDER — ASPIRIN/CALCIUM CARB/MAGNESIUM 324 MG
81 TABLET ORAL DAILY
Qty: 0 | Refills: 0 | Status: DISCONTINUED | OUTPATIENT
Start: 2018-10-16 | End: 2018-10-30

## 2018-10-16 RX ORDER — CIPROFLOXACIN LACTATE 400MG/40ML
500 VIAL (ML) INTRAVENOUS EVERY 12 HOURS
Qty: 0 | Refills: 0 | Status: DISCONTINUED | OUTPATIENT
Start: 2018-10-16 | End: 2018-10-22

## 2018-10-16 RX ORDER — ENTACAPONE 200 MG/1
200 TABLET, FILM COATED ORAL
Qty: 0 | Refills: 0 | Status: DISCONTINUED | OUTPATIENT
Start: 2018-10-16 | End: 2018-10-30

## 2018-10-16 RX ORDER — SENNA PLUS 8.6 MG/1
2 TABLET ORAL AT BEDTIME
Qty: 0 | Refills: 0 | Status: DISCONTINUED | OUTPATIENT
Start: 2018-10-16 | End: 2018-10-30

## 2018-10-16 RX ORDER — OXYCODONE HYDROCHLORIDE 5 MG/1
5 TABLET ORAL EVERY 4 HOURS
Qty: 0 | Refills: 0 | Status: DISCONTINUED | OUTPATIENT
Start: 2018-10-16 | End: 2018-10-22

## 2018-10-16 RX ORDER — IBUPROFEN 200 MG
400 TABLET ORAL EVERY 8 HOURS
Qty: 0 | Refills: 0 | Status: DISCONTINUED | OUTPATIENT
Start: 2018-10-16 | End: 2018-10-30

## 2018-10-16 RX ORDER — MAGNESIUM OXIDE 400 MG ORAL TABLET 241.3 MG
400 TABLET ORAL
Qty: 0 | Refills: 0 | Status: DISCONTINUED | OUTPATIENT
Start: 2018-10-16 | End: 2018-10-30

## 2018-10-16 RX ORDER — ENOXAPARIN SODIUM 100 MG/ML
40 INJECTION SUBCUTANEOUS DAILY
Qty: 0 | Refills: 0 | Status: DISCONTINUED | OUTPATIENT
Start: 2018-10-17 | End: 2018-10-30

## 2018-10-16 RX ORDER — CARVEDILOL PHOSPHATE 80 MG/1
1 CAPSULE, EXTENDED RELEASE ORAL
Qty: 60 | Refills: 0 | OUTPATIENT
Start: 2018-10-16 | End: 2018-11-14

## 2018-10-16 RX ORDER — ASPIRIN/CALCIUM CARB/MAGNESIUM 324 MG
1 TABLET ORAL
Qty: 0 | Refills: 0 | DISCHARGE
Start: 2018-10-16

## 2018-10-16 RX ORDER — CARVEDILOL PHOSPHATE 80 MG/1
25 CAPSULE, EXTENDED RELEASE ORAL EVERY 12 HOURS
Qty: 0 | Refills: 0 | Status: DISCONTINUED | OUTPATIENT
Start: 2018-10-16 | End: 2018-10-16

## 2018-10-16 RX ORDER — ALLOPURINOL 300 MG
200 TABLET ORAL DAILY
Qty: 0 | Refills: 0 | Status: DISCONTINUED | OUTPATIENT
Start: 2018-10-17 | End: 2018-10-18

## 2018-10-16 RX ORDER — METFORMIN HYDROCHLORIDE 850 MG/1
500 TABLET ORAL
Qty: 0 | Refills: 0 | Status: DISCONTINUED | OUTPATIENT
Start: 2018-10-16 | End: 2018-10-30

## 2018-10-16 RX ORDER — CIPROFLOXACIN LACTATE 400MG/40ML
500 VIAL (ML) INTRAVENOUS EVERY 12 HOURS
Qty: 0 | Refills: 0 | Status: DISCONTINUED | OUTPATIENT
Start: 2018-10-16 | End: 2018-10-16

## 2018-10-16 RX ORDER — DOCUSATE SODIUM 100 MG
100 CAPSULE ORAL THREE TIMES A DAY
Qty: 0 | Refills: 0 | Status: DISCONTINUED | OUTPATIENT
Start: 2018-10-16 | End: 2018-10-30

## 2018-10-16 RX ORDER — CHLORHEXIDINE GLUCONATE 213 G/1000ML
1 SOLUTION TOPICAL
Qty: 0 | Refills: 0 | Status: DISCONTINUED | OUTPATIENT
Start: 2018-10-16 | End: 2018-10-30

## 2018-10-16 RX ORDER — CIPROFLOXACIN LACTATE 400MG/40ML
1 VIAL (ML) INTRAVENOUS
Qty: 0 | Refills: 0 | COMMUNITY
Start: 2018-10-16

## 2018-10-16 RX ADMIN — MAGNESIUM OXIDE 400 MG ORAL TABLET 400 MILLIGRAM(S): 241.3 TABLET ORAL at 08:30

## 2018-10-16 RX ADMIN — ENTACAPONE 200 MILLIGRAM(S): 200 TABLET, FILM COATED ORAL at 12:30

## 2018-10-16 RX ADMIN — METFORMIN HYDROCHLORIDE 500 MILLIGRAM(S): 850 TABLET ORAL at 17:04

## 2018-10-16 RX ADMIN — CARBIDOPA AND LEVODOPA 1 TABLET(S): 25; 100 TABLET ORAL at 12:29

## 2018-10-16 RX ADMIN — METFORMIN HYDROCHLORIDE 500 MILLIGRAM(S): 850 TABLET ORAL at 05:11

## 2018-10-16 RX ADMIN — ENTACAPONE 200 MILLIGRAM(S): 200 TABLET, FILM COATED ORAL at 21:17

## 2018-10-16 RX ADMIN — CARBIDOPA AND LEVODOPA 1 TABLET(S): 25; 100 TABLET ORAL at 05:11

## 2018-10-16 RX ADMIN — ENTACAPONE 200 MILLIGRAM(S): 200 TABLET, FILM COATED ORAL at 05:11

## 2018-10-16 RX ADMIN — ENTACAPONE 200 MILLIGRAM(S): 200 TABLET, FILM COATED ORAL at 18:39

## 2018-10-16 RX ADMIN — Medication 100 MILLIGRAM(S): at 21:17

## 2018-10-16 RX ADMIN — ATORVASTATIN CALCIUM 40 MILLIGRAM(S): 80 TABLET, FILM COATED ORAL at 21:17

## 2018-10-16 RX ADMIN — Medication 500 MILLIGRAM(S): at 18:39

## 2018-10-16 RX ADMIN — LISINOPRIL 20 MILLIGRAM(S): 2.5 TABLET ORAL at 05:11

## 2018-10-16 RX ADMIN — Medication 400 MILLIGRAM(S): at 17:20

## 2018-10-16 RX ADMIN — CARVEDILOL PHOSPHATE 12.5 MILLIGRAM(S): 80 CAPSULE, EXTENDED RELEASE ORAL at 05:11

## 2018-10-16 RX ADMIN — ENTACAPONE 200 MILLIGRAM(S): 200 TABLET, FILM COATED ORAL at 00:12

## 2018-10-16 RX ADMIN — CARVEDILOL PHOSPHATE 12.5 MILLIGRAM(S): 80 CAPSULE, EXTENDED RELEASE ORAL at 18:39

## 2018-10-16 RX ADMIN — Medication 81 MILLIGRAM(S): at 12:29

## 2018-10-16 RX ADMIN — CARBIDOPA AND LEVODOPA 1 TABLET(S): 25; 100 TABLET ORAL at 21:16

## 2018-10-16 RX ADMIN — MAGNESIUM OXIDE 400 MG ORAL TABLET 400 MILLIGRAM(S): 241.3 TABLET ORAL at 12:30

## 2018-10-16 RX ADMIN — ESCITALOPRAM OXALATE 10 MILLIGRAM(S): 10 TABLET, FILM COATED ORAL at 12:29

## 2018-10-16 RX ADMIN — Medication 400 MILLIGRAM(S): at 18:38

## 2018-10-16 RX ADMIN — Medication 50 MILLIGRAM(S): at 05:11

## 2018-10-16 RX ADMIN — CARBIDOPA AND LEVODOPA 1 TABLET(S): 25; 100 TABLET ORAL at 00:12

## 2018-10-16 RX ADMIN — MAGNESIUM OXIDE 400 MG ORAL TABLET 400 MILLIGRAM(S): 241.3 TABLET ORAL at 18:39

## 2018-10-16 RX ADMIN — CARBIDOPA AND LEVODOPA 1 TABLET(S): 25; 100 TABLET ORAL at 18:39

## 2018-10-16 NOTE — DISCHARGE NOTE ADULT - PLAN OF CARE
gait training Falls multifactorial - orthopedic problems, parkinsonism, cervicogenic dysequilibrium, diabetic neuropathy none of which have cure, but need to be managed.    acute rehab take ciprofloxacin for 6 more days   follow up urine culture and adjust antibiotics accordingly treated with prednisone in the hospital   continue taking allopurinol

## 2018-10-16 NOTE — DISCHARGE NOTE ADULT - ADDITIONAL INSTRUCTIONS
heart healthy , diabetes   carb consistent heart healthy , diabetes   carb consistent, low purine diet,

## 2018-10-16 NOTE — DISCHARGE NOTE ADULT - PLAN OF CARE
to have a normal blood pressure  levels and prevent stroke, heart attacks and other complications to prevent worsening  symptoms continue  sinemet ant entacapone as advised , follow with your neurologist in 2 weeks to prevent recurrence and discomfort from pain, eat low purine diet  avoid red meat and beer continue allopurinol, get medical help when gout flares up to prevent condition from worsening, continue checking blood glucose levels frequently and medical follow up in 1 to 2 weeks

## 2018-10-16 NOTE — DISCHARGE NOTE ADULT - PATIENT PORTAL LINK FT
You can access the [facility] Patient Portal, offered by [facility], by registering with the following website: [email or web address]

## 2018-10-16 NOTE — DISCHARGE NOTE ADULT - MEDICATION SUMMARY - MEDICATIONS TO STOP TAKING
I will STOP taking the medications listed below when I get home from the hospital:    docusate sodium 100 mg oral capsule  -- 1 cap(s) by mouth 3 times a day, As Needed for constipation (stool softener)    Lexapro 10 mg oral tablet  -- 1 tab(s) by mouth once a day

## 2018-10-16 NOTE — DISCHARGE NOTE ADULT - PATIENT PORTAL LINK FT
You can access the [facility], offered by [facility], by registering with the following website: [email or web address]

## 2018-10-16 NOTE — DISCHARGE NOTE ADULT - CARE PLAN
Principal Discharge DX:	Fall, initial encounter  Goal:	gait training  Assessment and plan of treatment:	Falls multifactorial - orthopedic problems, parkinsonism, cervicogenic dysequilibrium, diabetic neuropathy none of which have cure, but need to be managed.    acute rehab  Secondary Diagnosis:	Urinary tract infection without hematuria, site unspecified  Assessment and plan of treatment:	take ciprofloxacin for 6 more days   follow up urine culture and adjust antibiotics accordingly  Secondary Diagnosis:	Parkinson disease  Secondary Diagnosis:	Hypertension, unspecified type  Secondary Diagnosis:	Chronic gout of knee, unspecified cause, unspecified laterality  Assessment and plan of treatment:	treated with prednisone in the hospital   continue taking allopurinol

## 2018-10-16 NOTE — DISCHARGE NOTE ADULT - MEDICATION SUMMARY - MEDICATIONS TO STOP TAKING
I will STOP taking the medications listed below when I get home from the hospital:    acetaminophen 325 mg oral tablet  -- 2 tab(s) by mouth every 6 hours, As needed, for pain or fever

## 2018-10-16 NOTE — PROGRESS NOTE ADULT - SUBJECTIVE AND OBJECTIVE BOX
Pt seen and examined at bedside. No fevers overnight. Patient denies any complaints. No events overnight.      VITAL SIGNS (Last 24 hrs):  T(C): 36.1 (10-16-18 @ 05:00), Max: 36.4 (10-15-18 @ 21:24)  HR: 66 (10-16-18 @ 05:00) (63 - 76)  BP: 128/78 (10-16-18 @ 05:00) (128/78 - 167/86)  RR: 16 (10-16-18 @ 05:00) (16 - 16)  SpO2: --  Wt(kg): --  Daily     Daily Weight in k (16 Oct 2018 05:00)    I&O's Summary      PHYSICAL EXAM:  GENERAL: NAD   HEAD:  Atraumatic, Normocephalic  EYES: conjunctiva and sclera clear  NECK: Supple, No JVD  CHEST/LUNG: Clear to auscultation bilaterally; No wheeze  HEART: Regular rate and rhythm; No murmurs, rubs, or gallops  ABDOMEN: Soft, Nontender, Nondistended; Bowel sounds present  EXTREMITIES:  2+ Peripheral Pulses, No clubbing, cyanosis, or edema  NEUROLOGY: non-focal  SKIN: No rashes or lesions    Labs Reviewed  Spoke to patient in regards to abnormal labs.    CBC Full  -  ( 16 Oct 2018 07:24 )  WBC Count : 18.26 K/uL  Hemoglobin : 14.0 g/dL  Hematocrit : 41.1 %  Platelet Count - Automated : 201 K/uL  Mean Cell Volume : 93.6 fL  Mean Cell Hemoglobin : 31.9 pg  Mean Cell Hemoglobin Concentration : 34.1 g/dL  Auto Neutrophil # : 13.23 K/uL  Auto Lymphocyte # : 3.49 K/uL  Auto Monocyte # : 1.24 K/uL  Auto Eosinophil # : 0.08 K/uL  Auto Basophil # : 0.03 K/uL  Auto Neutrophil % : 72.5 %  Auto Lymphocyte % : 19.1 %  Auto Monocyte % : 6.8 %  Auto Eosinophil % : 0.4 %  Auto Basophil % : 0.2 %    BMP:    10-15 @ 07:01    Blood Urea Nitrogen - 14  Calcium - 8.5  Carbond Dioxide - 27  Chloride - 97  Creatinine - 0.8  Glucose - 113  Potassium - 4.1  Sodium - 140      Hemoglobin A1c -     Urine Culture:  10-14 @ 17:11 Urine culture: --    Culture Results:   >100,000 CFU/ml Enterobacter cloacae/asburiae  Method Type: --  Organism: --  Organism Identification: --  Specimen Source: .Urine Clean Catch (Midstream)         MEDICATIONS  (STANDING):  amLODIPine   Tablet 5 milliGRAM(s) Oral at bedtime  aspirin enteric coated 81 milliGRAM(s) Oral daily  atorvastatin 40 milliGRAM(s) Oral at bedtime  carbidopa/levodopa  25/100 1 Tablet(s) Oral four times a day  carvedilol 12.5 milliGRAM(s) Oral every 12 hours  ciprofloxacin     Tablet 500 milliGRAM(s) Oral every 12 hours  dextrose 5%. 1000 milliLiter(s) (50 mL/Hr) IV Continuous <Continuous>  dextrose 50% Injectable 12.5 Gram(s) IV Push once  dextrose 50% Injectable 25 Gram(s) IV Push once  dextrose 50% Injectable 25 Gram(s) IV Push once  enoxaparin Injectable 40 milliGRAM(s) SubCutaneous every 24 hours  entacapone 200 milliGRAM(s) Oral four times a day  escitalopram 10 milliGRAM(s) Oral daily  insulin lispro (HumaLOG) corrective regimen sliding scale   SubCutaneous three times a day before meals  lisinopril 20 milliGRAM(s) Oral daily  magnesium oxide 400 milliGRAM(s) Oral three times a day with meals  metFORMIN 500 milliGRAM(s) Oral two times a day  morphine  - Injectable 4 milliGRAM(s) IV Push once    MEDICATIONS  (PRN):  acetaminophen   Tablet .. 650 milliGRAM(s) Oral every 6 hours PRN Temp greater or equal to 38C (100.4F), Moderate Pain (4 - 6)  dextrose 40% Gel 15 Gram(s) Oral once PRN Blood Glucose LESS THAN 70 milliGRAM(s)/deciliter  docusate sodium 100 milliGRAM(s) Oral three times a day PRN Constipation  glucagon  Injectable 1 milliGRAM(s) IntraMuscular once PRN Glucose LESS THAN 70 milligrams/deciliter  oxyCODONE    5 mG/acetaminophen 325 mG 1 Tablet(s) Oral every 6 hours PRN Severe Pain (7 - 10)

## 2018-10-16 NOTE — PROGRESS NOTE ADULT - SUBJECTIVE AND OBJECTIVE BOX
infectious diseases progress note:  ZAKIA MILLS is a 68yMale patient    WEAKNESS;KNEE PAIN;DEHYDRATION        ROS:  no dysuria     Allergies    cefaclor (Unknown)  penicillin (Unknown)  sulfa drugs (Unknown)    Intolerances        ANTIBIOTICS/RELEVANT:  antimicrobials  aztreonam  IVPB      aztreonam  IVPB 1000 milliGRAM(s) IV Intermittent every 8 hours    immunologic:    OTHER:  acetaminophen   Tablet .. 650 milliGRAM(s) Oral every 6 hours PRN  amLODIPine   Tablet 5 milliGRAM(s) Oral at bedtime  aspirin enteric coated 81 milliGRAM(s) Oral daily  atorvastatin 40 milliGRAM(s) Oral at bedtime  carbidopa/levodopa  25/100 1 Tablet(s) Oral four times a day  carvedilol 12.5 milliGRAM(s) Oral every 12 hours  dextrose 40% Gel 15 Gram(s) Oral once PRN  dextrose 5%. 1000 milliLiter(s) IV Continuous <Continuous>  dextrose 50% Injectable 12.5 Gram(s) IV Push once  dextrose 50% Injectable 25 Gram(s) IV Push once  dextrose 50% Injectable 25 Gram(s) IV Push once  docusate sodium 100 milliGRAM(s) Oral three times a day PRN  enoxaparin Injectable 40 milliGRAM(s) SubCutaneous every 24 hours  entacapone 200 milliGRAM(s) Oral four times a day  escitalopram 10 milliGRAM(s) Oral daily  glucagon  Injectable 1 milliGRAM(s) IntraMuscular once PRN  insulin lispro (HumaLOG) corrective regimen sliding scale   SubCutaneous three times a day before meals  lisinopril 20 milliGRAM(s) Oral daily  magnesium oxide 400 milliGRAM(s) Oral three times a day with meals  metFORMIN 500 milliGRAM(s) Oral two times a day  morphine  - Injectable 4 milliGRAM(s) IV Push once  oxyCODONE    5 mG/acetaminophen 325 mG 1 Tablet(s) Oral every 6 hours PRN      Objective:  T(F): 96.9 (10-16-18 @ 05:00), Max: 97.6 (10-15-18 @ 21:24)  HR: 66 (10-16-18 @ 05:00) (63 - 76)  BP: 128/78 (10-16-18 @ 05:00) (128/78 - 167/86)  RR: 16 (10-16-18 @ 05:00) (16 - 16)  SpO2: --    PHYSICAL EXAM:  Constitutional:Well-developed, well nourished  Eyes:SUKHDEEP, EOMI  Ear/Nose/Throat: no oral lesion, no sinus tenderness on percussion	  Neck:no JVD, no lymphadenopathy, supple  Respiratory: CTA vadim  Cardiovascular: S1S2 RRR, no murmurs  Gastrointestinal:soft, (+) BS, no HSM  Extremities: no phlebitis . swollen joints - ? gout       LABS:                        14.6   19.79 )-----------( 179      ( 15 Oct 2018 07:01 )             42.8     10-15    140  |  97<L>  |  14  ----------------------------<  113<H>  4.1   |  27  |  0.8    Ca    8.5      15 Oct 2018 07:01  Mg     1.9     10-15    TPro  5.7<L>  /  Alb  3.1<L>  /  TBili  0.8  /  DBili  0.2  /  AST  14  /  ALT  5   /  AlkPhos  93  10-15      Urinalysis Basic - ( 14 Oct 2018 17:11 )    Color: Yellow / Appearance: Cloudy / S.020 / pH: x  Gluc: x / Ketone: Trace  / Bili: Negative / Urobili: 1.0 mg/dL   Blood: x / Protein: 100 mg/dL / Nitrite: Positive   Leuk Esterase: Small / RBC: 10-25 /HPF / WBC 26-50 /HPF   Sq Epi: x / Non Sq Epi: Negative / Bacteria: Many          MICROBIOLOGY:    Culture - Urine (collected 10-14-18 @ 17:11)  Source: .Urine Clean Catch (Midstream)  Preliminary Report (10-15-18 @ 19:31):    >100,000 CFU/ml Enterobacter cloacae/asburiae    Culture - Urine (collected 10-10-18 @ 11:49)  Source: .Urine Catheterized  Final Report (10-11-18 @ 17:25):    No growth        Culture - Urine (collected 14 Oct 2018 17:11)  Source: .Urine Clean Catch (Midstream)  Preliminary Report (15 Oct 2018 19:31):    >100,000 CFU/ml Enterobacter cloacae/asburiae      Culture Results:   >100,000 CFU/ml Enterobacter cloacae/asburiae (10-14 @ 17:11)  Culture Results:   No growth (10-10 @ 11:49)        RADIOLOGY & ADDITIONAL STUDIES:

## 2018-10-16 NOTE — DISCHARGE NOTE ADULT - SECONDARY DIAGNOSIS.
Urinary tract infection without hematuria, site unspecified Parkinson disease Hypertension, unspecified type Chronic gout of knee, unspecified cause, unspecified laterality

## 2018-10-16 NOTE — DISCHARGE NOTE ADULT - REASON FOR ADMISSION
Patient with hx of CVA, Parkinson's disease, DMII, HTN, and Gout presented after a fall. Patient's falls are multifactorial: arthritis, parkinsonism, cervicogenic dysequilibrium, and diabetic neuropathy , he was  transferedd to rehab, and he cpmpleted a rehab course , he is able to ambulate  with rolling walker ,  Patient was also treated for gout flare with prednisone. He  was  treated for  UTI  and course of  ciprofloxacin completed in rehab, he needs to follow up with primary medical doctor and neurologist in 1 to 2 weeks Patient with hx of CVA, Parkinson's disease, DMII, HTN, and Gout presented after a fall. Patient's falls are multifactorial: arthritis, parkinsonism, cervicogenic dysequilibrium, and diabetic neuropathy , he was  transferedd to rehab, and he cpmpleted a rehab course , he is able to ambulate  with rolling walker ,  Patient was also treated for gout flare with prednisone. He  was  treated for  UTI  and course of  ciprofloxacin completed in rehab, he needs to follow up with primary medical doctor and neurologist  dr santiago/jenny in 1 to 2 weeks and  rheumatologist  if  worsening  og gout  and his pmd in 2 weeks Patient with hx of CVA, Parkinson's disease, DM II, HTN, and Gout presented after a fall. Patient's falls are multifactorial: arthritis, parkinsonism, cervicogenic dysequilibrium, and diabetic neuropathy , he  was also seen by ortho dr Bush  and ruled out orthopedic reasons,  he was  transferred to rehab, and he completed a rehab course , he is able to ambulate  with rolling walker ,  Patient was also treated for gout flare with prednisone. He  was  treated for  UTI  and course of  ciprofloxacin completed in rehab, he needs to follow up with primary medical doctor and neurologist  dr santiago/jenny in 1 to 2 weeks and  rheumatologist  if  worsening  og gout  and his pmd in 2 weeks

## 2018-10-16 NOTE — PROGRESS NOTE ADULT - ASSESSMENT
69 yo Male with PMH that includes HTN, Parkinson, DM, CVA, gout, recurrent falls. He was brought in by caregiver with a history of being found down. Patient had apparently sustained a fall on Monday night and was unable to able to get up thus remained on the floor until he was found 2 days later. Patient on presentation complained of left shoulder and left knee pain. Much history could not be obtained due to h/o CVA with expressive aphasia. At baseline he ambulates with walker but has been having recurrent falls. Trauma work up was negative but x-ray showed widening of the acromioclavicular joint to 1.3 cm, which was evaluated by orthopedics. He was admitted for further evaluation.    Relative/caregiver - Close friend and  Suri [phone]. she is health care proxy per patient      # Complicated UTI  - c/w cipro for 6 more days, as per ID   - f/u urine culture at acute rehab     # Leukocytosis  - Uptrending white count with temp of 100.8 on 10/15, possibly from UTI since antibiotics were just started the day prior. Also on prednisone which could be contributing to the leukocytosis.   - f/u repeat CBC with differential at acute rehab     # s/p Fall:  - Falls most likely multifactorial, DM neuropathy, PD, and h/o CVA with parkinson's disease:   - PT / PMR recommends subacute rehab. Not a candidate for 4A.   - outpatient EMG rec by neuro  - eventually will need STR placement     # Parkinson's   - C/w SINEMET 25/100   - C/w COMTAN 4x per day      # Gout: improved  - Stop prednisone. S/p 5 doses   - history of more than one attacks, needs allopurinol or febuxostat for urate lower therapy on discharge     # h/o CVA:  - Continue Aspirin and Statin.  - c/w Norvasc 5mg qhs        # Coordination of care:  - DVT prophylaxis: LMWH

## 2018-10-16 NOTE — PROGRESS NOTE ADULT - PROVIDER SPECIALTY LIST ADULT
Hospitalist
Internal Medicine
Internal Medicine
Hospitalist
Infectious Disease

## 2018-10-16 NOTE — DISCHARGE NOTE ADULT - CARE PLAN
Principal Discharge DX:	Parkinson disease  Goal:	to prevent worsening  symptoms  Assessment and plan of treatment:	continue  sinemet ant entacapone as advised , follow with your neurologist in 2 weeks  Secondary Diagnosis:	Gout  Goal:	to prevent recurrence and discomfort from pain, eat low purine diet  avoid red meat and beer  Assessment and plan of treatment:	continue allopurinol, get medical help when gout flares up  Secondary Diagnosis:	Diabetes mellitus  Goal:	to prevent condition from worsening,  Assessment and plan of treatment:	continue checking blood glucose levels frequently and medical follow up in 1 to 2 weeks  Secondary Diagnosis:	HTN (hypertension)  Goal:	to have a normal blood pressure  levels and prevent stroke, heart attacks and other complications

## 2018-10-16 NOTE — DISCHARGE NOTE ADULT - MEDICATION SUMMARY - MEDICATIONS TO TAKE
I will START or STAY ON the medications listed below when I get home from the hospital:    aspirin 81 mg oral delayed release tablet  -- 1 tab(s) by mouth once a day  -- Indication: For secondary stroke prevention    acetaminophen 325 mg oral tablet  -- 2 tab(s) by mouth every 6 hours, As needed, Temp greater or equal to 38C (100.4F), Mild Pain (1 - 3)  -- Indication: For mild pain     ibuprofen 400 mg oral tablet  -- 1 tab(s) by mouth every 8 hours, As needed, Moderate Pain (4 - 6)  -- Indication: For for moderate pain     lisinopril 20 mg oral tablet  -- 1 tab(s) by mouth once a day (at bedtime)   -- Indication: For bloood pressure control    metFORMIN 500 mg oral tablet  -- 1 tab(s) by mouth 2 times a day (with meals) MDD:take with breakfast and lunch  -- Indication: For diabetes     allopurinol 100 mg oral tablet  -- 2 tab(s) by mouth once a day  -- Indication: For chronic gout     atorvastatin 40 mg oral tablet  -- 1 tab(s) by mouth once a day (at bedtime)  -- Indication: For cholesterol control    carbidopa-levodopa 25 mg-100 mg oral tablet  -- 1 tab(s) by mouth 4 times a day MDD:take at 8am, 12n,4 pm an jaycob 8 pm  -- Indication: For PARKINSONS WITH DECLINE    entacapone 200 mg oral tablet  -- 1 tab(s) by mouth 4 times a day, take together with Sinemet MDD:Take at 8am, 12n ,4 pm and at 8 pm with sinemet  -- Indication: For PARKINSONS WITH DECLINE    carvedilol 12.5 mg oral tablet  -- 1 tab(s) by mouth every 12 hours  -- Indication: For blood pressure and heart rate control    rivastigmine 4.6 mg/24 hr transdermal film, extended release  -- 1 patch by transdermal patch every 24 hours  -- Indication: For PARKINSONS WITH DECLINE    magnesium oxide 400 mg (241.3 mg elemental magnesium) oral tablet  -- 1 tab(s) by mouth 3 times a day (with meals)  -- Indication: For low magnesium level     Multiple Vitamins oral tablet  -- 1 tab(s) by mouth once a day  -- Indication: For vitamins

## 2018-10-16 NOTE — DISCHARGE NOTE ADULT - HOSPITAL COURSE
Patient with hx of CVA, Parkinson's disease, DMII, HTN, and Gout presented after a fall. Patient's falls are multifactorial: arthritis, parkinsonism, cervicogenic dysequilibrium, and diabetic neuropathy, none of which have cure, but need to be managed.  Patient was also treated for gout flare with prednisone. He is actively being treated for a complicated UTI. Please follow up urine culture and repeat CBC tomorrow. He is currently on ciprofloxacin.

## 2018-10-16 NOTE — DISCHARGE NOTE ADULT - MEDICATION SUMMARY - MEDICATIONS TO TAKE
I will START or STAY ON the medications listed below when I get home from the hospital:    aspirin 81 mg oral delayed release tablet  -- 1 tab(s) by mouth once a day  -- Indication: For CVA      lisinopril 20 mg oral tablet  -- 1 tab(s) by mouth once a day  -- Indication: For HTN    Lexapro 10 mg oral tablet  -- 1 tab(s) by mouth once a day  -- Indication: For Depression    metFORMIN 1000 mg oral tablet  -- 1 tab(s) by mouth once a day with breakfast  -- Indication: For Diabetes mellitus     allopurinol  -- 200 milligram(s) orally  -- Indication: For gout    Lipitor 40 mg oral tablet  -- 1 tab(s) by mouth once a day (at bedtime)  -- Indication: For CVA    carbidopa-levodopa 25 mg-100 mg oral tablet  -- 1 tab(s) by mouth 4 times a day, at 8AM, noon, 5PM and bedtime  -- Indication: For Parkinson's disease     entacapone 200 mg oral tablet  -- 1 tab(s) by mouth 4 times a day, take together with Sinemet  -- Indication: For Parkinson's Disease     carvedilol 25 mg oral tablet  -- 1 tab(s) by mouth 2 times a day   -- It is very important that you take or use this exactly as directed.  Do not skip doses or discontinue unless directed by your doctor.  May cause drowsiness.  Alcohol may intensify this effect.  Use care when operating dangerous machinery.  Some non-prescription drugs may aggravate your condition.  Read all labels carefully.  If a warning appears, check with your doctor before taking.  Take with food or milk.    -- Indication: For HTN    rivastigmine 4.6 mg/24 hr transdermal film, extended release  -- 1 patch by transdermal patch every 24 hours  -- Indication: For Parkinson's dementia     docusate sodium 100 mg oral capsule  -- 1 cap(s) by mouth 3 times a day, As Needed for constipation (stool softener)  -- Indication: For constipation    magnesium oxide 400 mg (241.3 mg elemental magnesium) oral tablet  -- 1 tab(s) by mouth 3 times a day (with meals)  -- Indication: For supplement    ciprofloxacin 500 mg oral tablet  -- 1 tab(s) by mouth every 12 hours  -- Indication: For Urinary tract infection without hematuria, site unspecified

## 2018-10-16 NOTE — DISCHARGE NOTE ADULT - CARE PROVIDER_API CALL
Harpreet Cevallos), Neurology  [facility]  [address] [place] [address]  Phone: [phone]  Fax: [phone]    your pmd,   Phone: (   )    -  Fax: (   )    -

## 2018-10-17 LAB
24R-OH-CALCIDIOL SERPL-MCNC: 46 NG/ML — SIGNIFICANT CHANGE UP (ref 30–80)
ALBUMIN SERPL ELPH-MCNC: 3.1 G/DL — LOW (ref 3.5–5.2)
ALP SERPL-CCNC: 77 U/L — SIGNIFICANT CHANGE UP (ref 30–115)
ALT FLD-CCNC: 12 U/L — SIGNIFICANT CHANGE UP (ref 0–41)
ANION GAP SERPL CALC-SCNC: 14 MMOL/L — SIGNIFICANT CHANGE UP (ref 7–14)
AST SERPL-CCNC: 15 U/L — SIGNIFICANT CHANGE UP (ref 0–41)
BASOPHILS # BLD AUTO: 0.04 K/UL — SIGNIFICANT CHANGE UP (ref 0–0.2)
BASOPHILS NFR BLD AUTO: 0.3 % — SIGNIFICANT CHANGE UP (ref 0–1)
BILIRUB SERPL-MCNC: 0.6 MG/DL — SIGNIFICANT CHANGE UP (ref 0.2–1.2)
BUN SERPL-MCNC: 17 MG/DL — SIGNIFICANT CHANGE UP (ref 10–20)
CALCIUM SERPL-MCNC: 8.1 MG/DL — LOW (ref 8.5–10.1)
CHLORIDE SERPL-SCNC: 100 MMOL/L — SIGNIFICANT CHANGE UP (ref 98–110)
CO2 SERPL-SCNC: 28 MMOL/L — SIGNIFICANT CHANGE UP (ref 17–32)
CREAT SERPL-MCNC: 0.7 MG/DL — SIGNIFICANT CHANGE UP (ref 0.7–1.5)
EOSINOPHIL # BLD AUTO: 0.12 K/UL — SIGNIFICANT CHANGE UP (ref 0–0.7)
EOSINOPHIL NFR BLD AUTO: 0.9 % — SIGNIFICANT CHANGE UP (ref 0–8)
GLUCOSE BLDC GLUCOMTR-MCNC: 137 MG/DL — HIGH (ref 70–99)
GLUCOSE BLDC GLUCOMTR-MCNC: 89 MG/DL — SIGNIFICANT CHANGE UP (ref 70–99)
GLUCOSE SERPL-MCNC: 84 MG/DL — SIGNIFICANT CHANGE UP (ref 70–99)
HCT VFR BLD CALC: 41.8 % — LOW (ref 42–52)
HGB BLD-MCNC: 14.3 G/DL — SIGNIFICANT CHANGE UP (ref 14–18)
IMM GRANULOCYTES NFR BLD AUTO: 1.6 % — HIGH (ref 0.1–0.3)
LYMPHOCYTES # BLD AUTO: 2.82 K/UL — SIGNIFICANT CHANGE UP (ref 1.2–3.4)
LYMPHOCYTES # BLD AUTO: 20.4 % — LOW (ref 20.5–51.1)
MAGNESIUM SERPL-MCNC: 2 MG/DL — SIGNIFICANT CHANGE UP (ref 1.8–2.4)
MCHC RBC-ENTMCNC: 31.2 PG — HIGH (ref 27–31)
MCHC RBC-ENTMCNC: 34.2 G/DL — SIGNIFICANT CHANGE UP (ref 32–37)
MCV RBC AUTO: 91.3 FL — SIGNIFICANT CHANGE UP (ref 80–94)
MONOCYTES # BLD AUTO: 1.08 K/UL — HIGH (ref 0.1–0.6)
MONOCYTES NFR BLD AUTO: 7.8 % — SIGNIFICANT CHANGE UP (ref 1.7–9.3)
NEUTROPHILS # BLD AUTO: 9.56 K/UL — HIGH (ref 1.4–6.5)
NEUTROPHILS NFR BLD AUTO: 69 % — SIGNIFICANT CHANGE UP (ref 42.2–75.2)
NRBC # BLD: 0 /100 WBCS — SIGNIFICANT CHANGE UP (ref 0–0)
PLATELET # BLD AUTO: 212 K/UL — SIGNIFICANT CHANGE UP (ref 130–400)
POTASSIUM SERPL-MCNC: 4.1 MMOL/L — SIGNIFICANT CHANGE UP (ref 3.5–5)
POTASSIUM SERPL-SCNC: 4.1 MMOL/L — SIGNIFICANT CHANGE UP (ref 3.5–5)
PROT SERPL-MCNC: 5.2 G/DL — LOW (ref 6–8)
RBC # BLD: 4.58 M/UL — LOW (ref 4.7–6.1)
RBC # FLD: 12.7 % — SIGNIFICANT CHANGE UP (ref 11.5–14.5)
SODIUM SERPL-SCNC: 142 MMOL/L — SIGNIFICANT CHANGE UP (ref 135–146)
VIT B12 SERPL-MCNC: 1186 PG/ML — SIGNIFICANT CHANGE UP (ref 232–1245)
WBC # BLD: 13.84 K/UL — HIGH (ref 4.8–10.8)
WBC # FLD AUTO: 13.84 K/UL — HIGH (ref 4.8–10.8)

## 2018-10-17 RX ADMIN — ENOXAPARIN SODIUM 40 MILLIGRAM(S): 100 INJECTION SUBCUTANEOUS at 12:32

## 2018-10-17 RX ADMIN — Medication 200 MILLIGRAM(S): at 12:34

## 2018-10-17 RX ADMIN — Medication 500 MILLIGRAM(S): at 17:25

## 2018-10-17 RX ADMIN — CARBIDOPA AND LEVODOPA 1 TABLET(S): 25; 100 TABLET ORAL at 08:05

## 2018-10-17 RX ADMIN — ESCITALOPRAM OXALATE 10 MILLIGRAM(S): 10 TABLET, FILM COATED ORAL at 12:31

## 2018-10-17 RX ADMIN — CHLORHEXIDINE GLUCONATE 1 APPLICATION(S): 213 SOLUTION TOPICAL at 06:14

## 2018-10-17 RX ADMIN — ENTACAPONE 200 MILLIGRAM(S): 200 TABLET, FILM COATED ORAL at 20:11

## 2018-10-17 RX ADMIN — ENTACAPONE 200 MILLIGRAM(S): 200 TABLET, FILM COATED ORAL at 08:05

## 2018-10-17 RX ADMIN — ATORVASTATIN CALCIUM 40 MILLIGRAM(S): 80 TABLET, FILM COATED ORAL at 21:57

## 2018-10-17 RX ADMIN — METFORMIN HYDROCHLORIDE 500 MILLIGRAM(S): 850 TABLET ORAL at 17:25

## 2018-10-17 RX ADMIN — ENTACAPONE 200 MILLIGRAM(S): 200 TABLET, FILM COATED ORAL at 17:25

## 2018-10-17 RX ADMIN — Medication 1 TABLET(S): at 12:32

## 2018-10-17 RX ADMIN — Medication 100 MILLIGRAM(S): at 06:14

## 2018-10-17 RX ADMIN — MAGNESIUM OXIDE 400 MG ORAL TABLET 400 MILLIGRAM(S): 241.3 TABLET ORAL at 08:06

## 2018-10-17 RX ADMIN — CARBIDOPA AND LEVODOPA 1 TABLET(S): 25; 100 TABLET ORAL at 17:26

## 2018-10-17 RX ADMIN — RIVASTIGMINE 1 PATCH: 4.6 PATCH, EXTENDED RELEASE TRANSDERMAL at 21:52

## 2018-10-17 RX ADMIN — CARBIDOPA AND LEVODOPA 1 TABLET(S): 25; 100 TABLET ORAL at 12:31

## 2018-10-17 RX ADMIN — Medication 81 MILLIGRAM(S): at 12:31

## 2018-10-17 RX ADMIN — CARBIDOPA AND LEVODOPA 1 TABLET(S): 25; 100 TABLET ORAL at 20:11

## 2018-10-17 RX ADMIN — Medication 100 MILLIGRAM(S): at 12:32

## 2018-10-17 RX ADMIN — MAGNESIUM OXIDE 400 MG ORAL TABLET 400 MILLIGRAM(S): 241.3 TABLET ORAL at 17:26

## 2018-10-17 RX ADMIN — Medication 500 MILLIGRAM(S): at 06:14

## 2018-10-17 RX ADMIN — CARVEDILOL PHOSPHATE 12.5 MILLIGRAM(S): 80 CAPSULE, EXTENDED RELEASE ORAL at 17:25

## 2018-10-17 RX ADMIN — CARVEDILOL PHOSPHATE 12.5 MILLIGRAM(S): 80 CAPSULE, EXTENDED RELEASE ORAL at 06:14

## 2018-10-17 RX ADMIN — LISINOPRIL 20 MILLIGRAM(S): 2.5 TABLET ORAL at 21:56

## 2018-10-17 RX ADMIN — MAGNESIUM OXIDE 400 MG ORAL TABLET 400 MILLIGRAM(S): 241.3 TABLET ORAL at 12:32

## 2018-10-17 RX ADMIN — METFORMIN HYDROCHLORIDE 500 MILLIGRAM(S): 850 TABLET ORAL at 08:05

## 2018-10-17 RX ADMIN — RIVASTIGMINE 1 PATCH: 4.6 PATCH, EXTENDED RELEASE TRANSDERMAL at 08:05

## 2018-10-18 LAB
APPEARANCE UR: ABNORMAL
BILIRUB UR-MCNC: NEGATIVE — SIGNIFICANT CHANGE UP
COLOR SPEC: SIGNIFICANT CHANGE UP
DIFF PNL FLD: NEGATIVE — SIGNIFICANT CHANGE UP
GLUCOSE BLDC GLUCOMTR-MCNC: 135 MG/DL — HIGH (ref 70–99)
GLUCOSE BLDC GLUCOMTR-MCNC: 97 MG/DL — SIGNIFICANT CHANGE UP (ref 70–99)
GLUCOSE UR QL: NEGATIVE MG/DL — SIGNIFICANT CHANGE UP
KETONES UR-MCNC: NEGATIVE — SIGNIFICANT CHANGE UP
LEUKOCYTE ESTERASE UR-ACNC: ABNORMAL
NITRITE UR-MCNC: NEGATIVE — SIGNIFICANT CHANGE UP
PH UR: 7.5 — SIGNIFICANT CHANGE UP (ref 5–8)
PROT UR-MCNC: NEGATIVE MG/DL — SIGNIFICANT CHANGE UP
SP GR SPEC: 1.01 — SIGNIFICANT CHANGE UP (ref 1.01–1.03)
UROBILINOGEN FLD QL: 1 MG/DL (ref 0.2–0.2)
WBC UR QL: ABNORMAL /HPF

## 2018-10-18 RX ORDER — ALLOPURINOL 300 MG
200 TABLET ORAL DAILY
Qty: 0 | Refills: 0 | Status: DISCONTINUED | OUTPATIENT
Start: 2018-10-18 | End: 2018-10-30

## 2018-10-18 RX ADMIN — Medication 200 MILLIGRAM(S): at 17:57

## 2018-10-18 RX ADMIN — CARBIDOPA AND LEVODOPA 1 TABLET(S): 25; 100 TABLET ORAL at 19:58

## 2018-10-18 RX ADMIN — CARVEDILOL PHOSPHATE 12.5 MILLIGRAM(S): 80 CAPSULE, EXTENDED RELEASE ORAL at 06:11

## 2018-10-18 RX ADMIN — Medication 81 MILLIGRAM(S): at 13:05

## 2018-10-18 RX ADMIN — LISINOPRIL 20 MILLIGRAM(S): 2.5 TABLET ORAL at 21:49

## 2018-10-18 RX ADMIN — RIVASTIGMINE 1 PATCH: 4.6 PATCH, EXTENDED RELEASE TRANSDERMAL at 08:19

## 2018-10-18 RX ADMIN — MAGNESIUM OXIDE 400 MG ORAL TABLET 400 MILLIGRAM(S): 241.3 TABLET ORAL at 13:05

## 2018-10-18 RX ADMIN — METFORMIN HYDROCHLORIDE 500 MILLIGRAM(S): 850 TABLET ORAL at 08:19

## 2018-10-18 RX ADMIN — Medication 100 MILLIGRAM(S): at 06:11

## 2018-10-18 RX ADMIN — METFORMIN HYDROCHLORIDE 500 MILLIGRAM(S): 850 TABLET ORAL at 17:57

## 2018-10-18 RX ADMIN — CARBIDOPA AND LEVODOPA 1 TABLET(S): 25; 100 TABLET ORAL at 08:19

## 2018-10-18 RX ADMIN — Medication 100 MILLIGRAM(S): at 13:07

## 2018-10-18 RX ADMIN — MAGNESIUM OXIDE 400 MG ORAL TABLET 400 MILLIGRAM(S): 241.3 TABLET ORAL at 17:57

## 2018-10-18 RX ADMIN — Medication 1 TABLET(S): at 13:05

## 2018-10-18 RX ADMIN — MAGNESIUM OXIDE 400 MG ORAL TABLET 400 MILLIGRAM(S): 241.3 TABLET ORAL at 08:19

## 2018-10-18 RX ADMIN — ENTACAPONE 200 MILLIGRAM(S): 200 TABLET, FILM COATED ORAL at 19:58

## 2018-10-18 RX ADMIN — CARBIDOPA AND LEVODOPA 1 TABLET(S): 25; 100 TABLET ORAL at 13:06

## 2018-10-18 RX ADMIN — CARVEDILOL PHOSPHATE 12.5 MILLIGRAM(S): 80 CAPSULE, EXTENDED RELEASE ORAL at 17:59

## 2018-10-18 RX ADMIN — RIVASTIGMINE 1 PATCH: 4.6 PATCH, EXTENDED RELEASE TRANSDERMAL at 20:12

## 2018-10-18 RX ADMIN — ENTACAPONE 200 MILLIGRAM(S): 200 TABLET, FILM COATED ORAL at 08:19

## 2018-10-18 RX ADMIN — ENTACAPONE 200 MILLIGRAM(S): 200 TABLET, FILM COATED ORAL at 13:05

## 2018-10-18 RX ADMIN — Medication 500 MILLIGRAM(S): at 06:11

## 2018-10-18 RX ADMIN — ATORVASTATIN CALCIUM 40 MILLIGRAM(S): 80 TABLET, FILM COATED ORAL at 21:49

## 2018-10-18 RX ADMIN — ENOXAPARIN SODIUM 40 MILLIGRAM(S): 100 INJECTION SUBCUTANEOUS at 13:06

## 2018-10-18 RX ADMIN — RIVASTIGMINE 1 PATCH: 4.6 PATCH, EXTENDED RELEASE TRANSDERMAL at 06:04

## 2018-10-18 RX ADMIN — CARBIDOPA AND LEVODOPA 1 TABLET(S): 25; 100 TABLET ORAL at 17:52

## 2018-10-18 RX ADMIN — ENTACAPONE 200 MILLIGRAM(S): 200 TABLET, FILM COATED ORAL at 18:00

## 2018-10-18 RX ADMIN — Medication 100 MILLIGRAM(S): at 21:49

## 2018-10-18 RX ADMIN — Medication 500 MILLIGRAM(S): at 17:58

## 2018-10-18 RX ADMIN — ESCITALOPRAM OXALATE 10 MILLIGRAM(S): 10 TABLET, FILM COATED ORAL at 13:05

## 2018-10-19 LAB
GLUCOSE BLDC GLUCOMTR-MCNC: 100 MG/DL — HIGH (ref 70–99)
GLUCOSE BLDC GLUCOMTR-MCNC: 144 MG/DL — HIGH (ref 70–99)

## 2018-10-19 RX ADMIN — Medication 100 MILLIGRAM(S): at 05:40

## 2018-10-19 RX ADMIN — ENOXAPARIN SODIUM 40 MILLIGRAM(S): 100 INJECTION SUBCUTANEOUS at 12:37

## 2018-10-19 RX ADMIN — METFORMIN HYDROCHLORIDE 500 MILLIGRAM(S): 850 TABLET ORAL at 08:29

## 2018-10-19 RX ADMIN — ENTACAPONE 200 MILLIGRAM(S): 200 TABLET, FILM COATED ORAL at 22:46

## 2018-10-19 RX ADMIN — CARBIDOPA AND LEVODOPA 1 TABLET(S): 25; 100 TABLET ORAL at 22:47

## 2018-10-19 RX ADMIN — Medication 1 TABLET(S): at 12:37

## 2018-10-19 RX ADMIN — Medication 100 MILLIGRAM(S): at 22:46

## 2018-10-19 RX ADMIN — RIVASTIGMINE 1 PATCH: 4.6 PATCH, EXTENDED RELEASE TRANSDERMAL at 08:28

## 2018-10-19 RX ADMIN — LISINOPRIL 20 MILLIGRAM(S): 2.5 TABLET ORAL at 22:46

## 2018-10-19 RX ADMIN — MAGNESIUM OXIDE 400 MG ORAL TABLET 400 MILLIGRAM(S): 241.3 TABLET ORAL at 08:28

## 2018-10-19 RX ADMIN — Medication 200 MILLIGRAM(S): at 13:58

## 2018-10-19 RX ADMIN — CARVEDILOL PHOSPHATE 12.5 MILLIGRAM(S): 80 CAPSULE, EXTENDED RELEASE ORAL at 05:40

## 2018-10-19 RX ADMIN — ENTACAPONE 200 MILLIGRAM(S): 200 TABLET, FILM COATED ORAL at 08:28

## 2018-10-19 RX ADMIN — CARBIDOPA AND LEVODOPA 1 TABLET(S): 25; 100 TABLET ORAL at 16:01

## 2018-10-19 RX ADMIN — ESCITALOPRAM OXALATE 10 MILLIGRAM(S): 10 TABLET, FILM COATED ORAL at 12:37

## 2018-10-19 RX ADMIN — Medication 81 MILLIGRAM(S): at 12:37

## 2018-10-19 RX ADMIN — ENTACAPONE 200 MILLIGRAM(S): 200 TABLET, FILM COATED ORAL at 12:37

## 2018-10-19 RX ADMIN — RIVASTIGMINE 1 PATCH: 4.6 PATCH, EXTENDED RELEASE TRANSDERMAL at 08:29

## 2018-10-19 RX ADMIN — ATORVASTATIN CALCIUM 40 MILLIGRAM(S): 80 TABLET, FILM COATED ORAL at 22:46

## 2018-10-19 RX ADMIN — MAGNESIUM OXIDE 400 MG ORAL TABLET 400 MILLIGRAM(S): 241.3 TABLET ORAL at 12:37

## 2018-10-19 RX ADMIN — CARVEDILOL PHOSPHATE 12.5 MILLIGRAM(S): 80 CAPSULE, EXTENDED RELEASE ORAL at 16:52

## 2018-10-19 RX ADMIN — MAGNESIUM OXIDE 400 MG ORAL TABLET 400 MILLIGRAM(S): 241.3 TABLET ORAL at 16:51

## 2018-10-19 RX ADMIN — Medication 500 MILLIGRAM(S): at 16:53

## 2018-10-19 RX ADMIN — CARBIDOPA AND LEVODOPA 1 TABLET(S): 25; 100 TABLET ORAL at 12:37

## 2018-10-19 RX ADMIN — Medication 500 MILLIGRAM(S): at 05:40

## 2018-10-19 RX ADMIN — METFORMIN HYDROCHLORIDE 500 MILLIGRAM(S): 850 TABLET ORAL at 16:53

## 2018-10-19 RX ADMIN — Medication 100 MILLIGRAM(S): at 12:37

## 2018-10-19 RX ADMIN — ENTACAPONE 200 MILLIGRAM(S): 200 TABLET, FILM COATED ORAL at 16:50

## 2018-10-19 RX ADMIN — CHLORHEXIDINE GLUCONATE 1 APPLICATION(S): 213 SOLUTION TOPICAL at 05:41

## 2018-10-19 RX ADMIN — CARBIDOPA AND LEVODOPA 1 TABLET(S): 25; 100 TABLET ORAL at 08:28

## 2018-10-20 LAB
GLUCOSE BLDC GLUCOMTR-MCNC: 122 MG/DL — HIGH (ref 70–99)
GLUCOSE BLDC GLUCOMTR-MCNC: 154 MG/DL — HIGH (ref 70–99)

## 2018-10-20 RX ADMIN — RIVASTIGMINE 1 PATCH: 4.6 PATCH, EXTENDED RELEASE TRANSDERMAL at 08:32

## 2018-10-20 RX ADMIN — MAGNESIUM OXIDE 400 MG ORAL TABLET 400 MILLIGRAM(S): 241.3 TABLET ORAL at 17:25

## 2018-10-20 RX ADMIN — ATORVASTATIN CALCIUM 40 MILLIGRAM(S): 80 TABLET, FILM COATED ORAL at 22:05

## 2018-10-20 RX ADMIN — METFORMIN HYDROCHLORIDE 500 MILLIGRAM(S): 850 TABLET ORAL at 08:31

## 2018-10-20 RX ADMIN — ENTACAPONE 200 MILLIGRAM(S): 200 TABLET, FILM COATED ORAL at 08:31

## 2018-10-20 RX ADMIN — METFORMIN HYDROCHLORIDE 500 MILLIGRAM(S): 850 TABLET ORAL at 17:25

## 2018-10-20 RX ADMIN — ENOXAPARIN SODIUM 40 MILLIGRAM(S): 100 INJECTION SUBCUTANEOUS at 11:18

## 2018-10-20 RX ADMIN — Medication 1 TABLET(S): at 11:18

## 2018-10-20 RX ADMIN — MAGNESIUM OXIDE 400 MG ORAL TABLET 400 MILLIGRAM(S): 241.3 TABLET ORAL at 11:20

## 2018-10-20 RX ADMIN — CARBIDOPA AND LEVODOPA 1 TABLET(S): 25; 100 TABLET ORAL at 19:42

## 2018-10-20 RX ADMIN — Medication 100 MILLIGRAM(S): at 17:26

## 2018-10-20 RX ADMIN — RIVASTIGMINE 1 PATCH: 4.6 PATCH, EXTENDED RELEASE TRANSDERMAL at 17:25

## 2018-10-20 RX ADMIN — CARBIDOPA AND LEVODOPA 1 TABLET(S): 25; 100 TABLET ORAL at 17:26

## 2018-10-20 RX ADMIN — Medication 500 MILLIGRAM(S): at 06:31

## 2018-10-20 RX ADMIN — RIVASTIGMINE 1 PATCH: 4.6 PATCH, EXTENDED RELEASE TRANSDERMAL at 17:26

## 2018-10-20 RX ADMIN — ENTACAPONE 200 MILLIGRAM(S): 200 TABLET, FILM COATED ORAL at 11:18

## 2018-10-20 RX ADMIN — CARVEDILOL PHOSPHATE 12.5 MILLIGRAM(S): 80 CAPSULE, EXTENDED RELEASE ORAL at 17:26

## 2018-10-20 RX ADMIN — ENTACAPONE 200 MILLIGRAM(S): 200 TABLET, FILM COATED ORAL at 19:42

## 2018-10-20 RX ADMIN — Medication 200 MILLIGRAM(S): at 11:17

## 2018-10-20 RX ADMIN — LISINOPRIL 20 MILLIGRAM(S): 2.5 TABLET ORAL at 22:05

## 2018-10-20 RX ADMIN — Medication 100 MILLIGRAM(S): at 06:31

## 2018-10-20 RX ADMIN — CARVEDILOL PHOSPHATE 12.5 MILLIGRAM(S): 80 CAPSULE, EXTENDED RELEASE ORAL at 06:31

## 2018-10-20 RX ADMIN — CARBIDOPA AND LEVODOPA 1 TABLET(S): 25; 100 TABLET ORAL at 08:30

## 2018-10-20 RX ADMIN — Medication 500 MILLIGRAM(S): at 17:26

## 2018-10-20 RX ADMIN — Medication 81 MILLIGRAM(S): at 11:17

## 2018-10-20 RX ADMIN — MAGNESIUM OXIDE 400 MG ORAL TABLET 400 MILLIGRAM(S): 241.3 TABLET ORAL at 08:31

## 2018-10-20 RX ADMIN — ESCITALOPRAM OXALATE 10 MILLIGRAM(S): 10 TABLET, FILM COATED ORAL at 11:18

## 2018-10-20 RX ADMIN — CARBIDOPA AND LEVODOPA 1 TABLET(S): 25; 100 TABLET ORAL at 11:17

## 2018-10-20 RX ADMIN — ENTACAPONE 200 MILLIGRAM(S): 200 TABLET, FILM COATED ORAL at 17:25

## 2018-10-20 RX ADMIN — RIVASTIGMINE 1 PATCH: 4.6 PATCH, EXTENDED RELEASE TRANSDERMAL at 11:15

## 2018-10-21 LAB
CULTURE RESULTS: SIGNIFICANT CHANGE UP
GLUCOSE BLDC GLUCOMTR-MCNC: 106 MG/DL — HIGH (ref 70–99)
GLUCOSE BLDC GLUCOMTR-MCNC: 122 MG/DL — HIGH (ref 70–99)
GLUCOSE BLDC GLUCOMTR-MCNC: 132 MG/DL — HIGH (ref 70–99)
SPECIMEN SOURCE: SIGNIFICANT CHANGE UP

## 2018-10-21 RX ORDER — COLCHICINE 0.6 MG
0.6 TABLET ORAL DAILY
Qty: 0 | Refills: 0 | Status: COMPLETED | OUTPATIENT
Start: 2018-10-21 | End: 2018-10-28

## 2018-10-21 RX ADMIN — RIVASTIGMINE 1 PATCH: 4.6 PATCH, EXTENDED RELEASE TRANSDERMAL at 07:56

## 2018-10-21 RX ADMIN — Medication 100 MILLIGRAM(S): at 05:17

## 2018-10-21 RX ADMIN — RIVASTIGMINE 1 PATCH: 4.6 PATCH, EXTENDED RELEASE TRANSDERMAL at 07:50

## 2018-10-21 RX ADMIN — RIVASTIGMINE 1 PATCH: 4.6 PATCH, EXTENDED RELEASE TRANSDERMAL at 19:48

## 2018-10-21 RX ADMIN — CARBIDOPA AND LEVODOPA 1 TABLET(S): 25; 100 TABLET ORAL at 19:35

## 2018-10-21 RX ADMIN — MAGNESIUM OXIDE 400 MG ORAL TABLET 400 MILLIGRAM(S): 241.3 TABLET ORAL at 17:13

## 2018-10-21 RX ADMIN — MAGNESIUM OXIDE 400 MG ORAL TABLET 400 MILLIGRAM(S): 241.3 TABLET ORAL at 07:54

## 2018-10-21 RX ADMIN — ENTACAPONE 200 MILLIGRAM(S): 200 TABLET, FILM COATED ORAL at 11:26

## 2018-10-21 RX ADMIN — CARBIDOPA AND LEVODOPA 1 TABLET(S): 25; 100 TABLET ORAL at 07:54

## 2018-10-21 RX ADMIN — Medication 0.6 MILLIGRAM(S): at 19:35

## 2018-10-21 RX ADMIN — CARBIDOPA AND LEVODOPA 1 TABLET(S): 25; 100 TABLET ORAL at 17:13

## 2018-10-21 RX ADMIN — ESCITALOPRAM OXALATE 10 MILLIGRAM(S): 10 TABLET, FILM COATED ORAL at 11:26

## 2018-10-21 RX ADMIN — Medication 200 MILLIGRAM(S): at 11:26

## 2018-10-21 RX ADMIN — ENOXAPARIN SODIUM 40 MILLIGRAM(S): 100 INJECTION SUBCUTANEOUS at 11:26

## 2018-10-21 RX ADMIN — ENTACAPONE 200 MILLIGRAM(S): 200 TABLET, FILM COATED ORAL at 19:35

## 2018-10-21 RX ADMIN — CARBIDOPA AND LEVODOPA 1 TABLET(S): 25; 100 TABLET ORAL at 11:26

## 2018-10-21 RX ADMIN — Medication 500 MILLIGRAM(S): at 05:17

## 2018-10-21 RX ADMIN — ATORVASTATIN CALCIUM 40 MILLIGRAM(S): 80 TABLET, FILM COATED ORAL at 21:27

## 2018-10-21 RX ADMIN — CARVEDILOL PHOSPHATE 12.5 MILLIGRAM(S): 80 CAPSULE, EXTENDED RELEASE ORAL at 05:17

## 2018-10-21 RX ADMIN — Medication 81 MILLIGRAM(S): at 11:26

## 2018-10-21 RX ADMIN — ENTACAPONE 200 MILLIGRAM(S): 200 TABLET, FILM COATED ORAL at 07:54

## 2018-10-21 RX ADMIN — MAGNESIUM OXIDE 400 MG ORAL TABLET 400 MILLIGRAM(S): 241.3 TABLET ORAL at 11:28

## 2018-10-21 RX ADMIN — Medication 1 TABLET(S): at 11:25

## 2018-10-21 RX ADMIN — Medication 500 MILLIGRAM(S): at 17:13

## 2018-10-21 RX ADMIN — METFORMIN HYDROCHLORIDE 500 MILLIGRAM(S): 850 TABLET ORAL at 07:54

## 2018-10-21 RX ADMIN — Medication 40 MILLIGRAM(S): at 19:34

## 2018-10-21 RX ADMIN — CARVEDILOL PHOSPHATE 12.5 MILLIGRAM(S): 80 CAPSULE, EXTENDED RELEASE ORAL at 17:13

## 2018-10-21 RX ADMIN — ENTACAPONE 200 MILLIGRAM(S): 200 TABLET, FILM COATED ORAL at 17:13

## 2018-10-21 RX ADMIN — METFORMIN HYDROCHLORIDE 500 MILLIGRAM(S): 850 TABLET ORAL at 17:15

## 2018-10-21 RX ADMIN — RIVASTIGMINE 1 PATCH: 4.6 PATCH, EXTENDED RELEASE TRANSDERMAL at 07:54

## 2018-10-22 LAB
ANION GAP SERPL CALC-SCNC: 12 MMOL/L — SIGNIFICANT CHANGE UP (ref 7–14)
BUN SERPL-MCNC: 16 MG/DL — SIGNIFICANT CHANGE UP (ref 10–20)
CALCIUM SERPL-MCNC: 9.2 MG/DL — SIGNIFICANT CHANGE UP (ref 8.5–10.1)
CHLORIDE SERPL-SCNC: 100 MMOL/L — SIGNIFICANT CHANGE UP (ref 98–110)
CO2 SERPL-SCNC: 27 MMOL/L — SIGNIFICANT CHANGE UP (ref 17–32)
CREAT SERPL-MCNC: 0.9 MG/DL — SIGNIFICANT CHANGE UP (ref 0.7–1.5)
GLUCOSE BLDC GLUCOMTR-MCNC: 158 MG/DL — HIGH (ref 70–99)
GLUCOSE BLDC GLUCOMTR-MCNC: 227 MG/DL — HIGH (ref 70–99)
GLUCOSE SERPL-MCNC: 163 MG/DL — HIGH (ref 70–99)
HCT VFR BLD CALC: 44.1 % — SIGNIFICANT CHANGE UP (ref 42–52)
HGB BLD-MCNC: 14.9 G/DL — SIGNIFICANT CHANGE UP (ref 14–18)
MAGNESIUM SERPL-MCNC: 2.1 MG/DL — SIGNIFICANT CHANGE UP (ref 1.8–2.4)
MCHC RBC-ENTMCNC: 31.2 PG — HIGH (ref 27–31)
MCHC RBC-ENTMCNC: 33.8 G/DL — SIGNIFICANT CHANGE UP (ref 32–37)
MCV RBC AUTO: 92.3 FL — SIGNIFICANT CHANGE UP (ref 80–94)
NRBC # BLD: 0 /100 WBCS — SIGNIFICANT CHANGE UP (ref 0–0)
PLATELET # BLD AUTO: 264 K/UL — SIGNIFICANT CHANGE UP (ref 130–400)
POTASSIUM SERPL-MCNC: 5.5 MMOL/L — HIGH (ref 3.5–5)
POTASSIUM SERPL-SCNC: 5.5 MMOL/L — HIGH (ref 3.5–5)
RBC # BLD: 4.78 M/UL — SIGNIFICANT CHANGE UP (ref 4.7–6.1)
RBC # FLD: 12.2 % — SIGNIFICANT CHANGE UP (ref 11.5–14.5)
SODIUM SERPL-SCNC: 139 MMOL/L — SIGNIFICANT CHANGE UP (ref 135–146)
WBC # BLD: 13.3 K/UL — HIGH (ref 4.8–10.8)
WBC # FLD AUTO: 13.3 K/UL — HIGH (ref 4.8–10.8)

## 2018-10-22 RX ORDER — CIPROFLOXACIN LACTATE 400MG/40ML
500 VIAL (ML) INTRAVENOUS EVERY 12 HOURS
Qty: 0 | Refills: 0 | Status: COMPLETED | OUTPATIENT
Start: 2018-10-22 | End: 2018-10-23

## 2018-10-22 RX ADMIN — Medication 40 MILLIGRAM(S): at 05:24

## 2018-10-22 RX ADMIN — ESCITALOPRAM OXALATE 10 MILLIGRAM(S): 10 TABLET, FILM COATED ORAL at 12:58

## 2018-10-22 RX ADMIN — CARBIDOPA AND LEVODOPA 1 TABLET(S): 25; 100 TABLET ORAL at 08:10

## 2018-10-22 RX ADMIN — ENTACAPONE 200 MILLIGRAM(S): 200 TABLET, FILM COATED ORAL at 12:57

## 2018-10-22 RX ADMIN — ATORVASTATIN CALCIUM 40 MILLIGRAM(S): 80 TABLET, FILM COATED ORAL at 21:54

## 2018-10-22 RX ADMIN — CARVEDILOL PHOSPHATE 12.5 MILLIGRAM(S): 80 CAPSULE, EXTENDED RELEASE ORAL at 05:23

## 2018-10-22 RX ADMIN — CARBIDOPA AND LEVODOPA 1 TABLET(S): 25; 100 TABLET ORAL at 18:23

## 2018-10-22 RX ADMIN — MAGNESIUM OXIDE 400 MG ORAL TABLET 400 MILLIGRAM(S): 241.3 TABLET ORAL at 08:09

## 2018-10-22 RX ADMIN — METFORMIN HYDROCHLORIDE 500 MILLIGRAM(S): 850 TABLET ORAL at 08:10

## 2018-10-22 RX ADMIN — ENTACAPONE 200 MILLIGRAM(S): 200 TABLET, FILM COATED ORAL at 08:10

## 2018-10-22 RX ADMIN — Medication 81 MILLIGRAM(S): at 13:00

## 2018-10-22 RX ADMIN — Medication 500 MILLIGRAM(S): at 05:23

## 2018-10-22 RX ADMIN — Medication 200 MILLIGRAM(S): at 12:55

## 2018-10-22 RX ADMIN — Medication 100 MILLIGRAM(S): at 21:53

## 2018-10-22 RX ADMIN — CARBIDOPA AND LEVODOPA 1 TABLET(S): 25; 100 TABLET ORAL at 21:56

## 2018-10-22 RX ADMIN — METFORMIN HYDROCHLORIDE 500 MILLIGRAM(S): 850 TABLET ORAL at 17:47

## 2018-10-22 RX ADMIN — Medication 100 MILLIGRAM(S): at 05:23

## 2018-10-22 RX ADMIN — RIVASTIGMINE 1 PATCH: 4.6 PATCH, EXTENDED RELEASE TRANSDERMAL at 08:10

## 2018-10-22 RX ADMIN — Medication 0.6 MILLIGRAM(S): at 17:51

## 2018-10-22 RX ADMIN — RIVASTIGMINE 1 PATCH: 4.6 PATCH, EXTENDED RELEASE TRANSDERMAL at 21:58

## 2018-10-22 RX ADMIN — CARBIDOPA AND LEVODOPA 1 TABLET(S): 25; 100 TABLET ORAL at 12:55

## 2018-10-22 RX ADMIN — MAGNESIUM OXIDE 400 MG ORAL TABLET 400 MILLIGRAM(S): 241.3 TABLET ORAL at 17:44

## 2018-10-22 RX ADMIN — Medication 1 TABLET(S): at 12:54

## 2018-10-22 RX ADMIN — RIVASTIGMINE 1 PATCH: 4.6 PATCH, EXTENDED RELEASE TRANSDERMAL at 07:30

## 2018-10-22 RX ADMIN — Medication 500 MILLIGRAM(S): at 18:20

## 2018-10-22 RX ADMIN — ENOXAPARIN SODIUM 40 MILLIGRAM(S): 100 INJECTION SUBCUTANEOUS at 12:58

## 2018-10-22 RX ADMIN — ENTACAPONE 200 MILLIGRAM(S): 200 TABLET, FILM COATED ORAL at 17:45

## 2018-10-22 RX ADMIN — CARVEDILOL PHOSPHATE 12.5 MILLIGRAM(S): 80 CAPSULE, EXTENDED RELEASE ORAL at 18:21

## 2018-10-22 RX ADMIN — ENTACAPONE 200 MILLIGRAM(S): 200 TABLET, FILM COATED ORAL at 21:56

## 2018-10-22 RX ADMIN — MAGNESIUM OXIDE 400 MG ORAL TABLET 400 MILLIGRAM(S): 241.3 TABLET ORAL at 12:55

## 2018-10-23 LAB
ANION GAP SERPL CALC-SCNC: 12 MMOL/L — SIGNIFICANT CHANGE UP (ref 7–14)
BUN SERPL-MCNC: 18 MG/DL — SIGNIFICANT CHANGE UP (ref 10–20)
CALCIUM SERPL-MCNC: 8.7 MG/DL — SIGNIFICANT CHANGE UP (ref 8.5–10.1)
CHLORIDE SERPL-SCNC: 105 MMOL/L — SIGNIFICANT CHANGE UP (ref 98–110)
CO2 SERPL-SCNC: 26 MMOL/L — SIGNIFICANT CHANGE UP (ref 17–32)
CREAT SERPL-MCNC: 1 MG/DL — SIGNIFICANT CHANGE UP (ref 0.7–1.5)
GLUCOSE BLDC GLUCOMTR-MCNC: 118 MG/DL — HIGH (ref 70–99)
GLUCOSE BLDC GLUCOMTR-MCNC: 172 MG/DL — HIGH (ref 70–99)
GLUCOSE SERPL-MCNC: 101 MG/DL — HIGH (ref 70–99)
POTASSIUM SERPL-MCNC: 4.3 MMOL/L — SIGNIFICANT CHANGE UP (ref 3.5–5)
POTASSIUM SERPL-SCNC: 4.3 MMOL/L — SIGNIFICANT CHANGE UP (ref 3.5–5)
SODIUM SERPL-SCNC: 143 MMOL/L — SIGNIFICANT CHANGE UP (ref 135–146)

## 2018-10-23 RX ADMIN — CARBIDOPA AND LEVODOPA 1 TABLET(S): 25; 100 TABLET ORAL at 08:21

## 2018-10-23 RX ADMIN — Medication 200 MILLIGRAM(S): at 13:00

## 2018-10-23 RX ADMIN — CARBIDOPA AND LEVODOPA 1 TABLET(S): 25; 100 TABLET ORAL at 17:00

## 2018-10-23 RX ADMIN — MAGNESIUM OXIDE 400 MG ORAL TABLET 400 MILLIGRAM(S): 241.3 TABLET ORAL at 12:20

## 2018-10-23 RX ADMIN — CARVEDILOL PHOSPHATE 12.5 MILLIGRAM(S): 80 CAPSULE, EXTENDED RELEASE ORAL at 05:45

## 2018-10-23 RX ADMIN — METFORMIN HYDROCHLORIDE 500 MILLIGRAM(S): 850 TABLET ORAL at 17:58

## 2018-10-23 RX ADMIN — Medication 100 MILLIGRAM(S): at 22:05

## 2018-10-23 RX ADMIN — RIVASTIGMINE 1 PATCH: 4.6 PATCH, EXTENDED RELEASE TRANSDERMAL at 08:22

## 2018-10-23 RX ADMIN — Medication 500 MILLIGRAM(S): at 05:45

## 2018-10-23 RX ADMIN — ENTACAPONE 200 MILLIGRAM(S): 200 TABLET, FILM COATED ORAL at 12:20

## 2018-10-23 RX ADMIN — Medication 0.6 MILLIGRAM(S): at 12:24

## 2018-10-23 RX ADMIN — ENTACAPONE 200 MILLIGRAM(S): 200 TABLET, FILM COATED ORAL at 08:21

## 2018-10-23 RX ADMIN — ENTACAPONE 200 MILLIGRAM(S): 200 TABLET, FILM COATED ORAL at 19:26

## 2018-10-23 RX ADMIN — CARBIDOPA AND LEVODOPA 1 TABLET(S): 25; 100 TABLET ORAL at 13:30

## 2018-10-23 RX ADMIN — ENTACAPONE 200 MILLIGRAM(S): 200 TABLET, FILM COATED ORAL at 17:58

## 2018-10-23 RX ADMIN — ENOXAPARIN SODIUM 40 MILLIGRAM(S): 100 INJECTION SUBCUTANEOUS at 12:29

## 2018-10-23 RX ADMIN — MAGNESIUM OXIDE 400 MG ORAL TABLET 400 MILLIGRAM(S): 241.3 TABLET ORAL at 17:58

## 2018-10-23 RX ADMIN — RIVASTIGMINE 1 PATCH: 4.6 PATCH, EXTENDED RELEASE TRANSDERMAL at 08:21

## 2018-10-23 RX ADMIN — Medication 100 MILLIGRAM(S): at 05:45

## 2018-10-23 RX ADMIN — Medication 40 MILLIGRAM(S): at 05:45

## 2018-10-23 RX ADMIN — METFORMIN HYDROCHLORIDE 500 MILLIGRAM(S): 850 TABLET ORAL at 08:21

## 2018-10-23 RX ADMIN — Medication 1 TABLET(S): at 12:20

## 2018-10-23 RX ADMIN — CARVEDILOL PHOSPHATE 12.5 MILLIGRAM(S): 80 CAPSULE, EXTENDED RELEASE ORAL at 17:58

## 2018-10-23 RX ADMIN — Medication 500 MILLIGRAM(S): at 17:59

## 2018-10-23 RX ADMIN — CARBIDOPA AND LEVODOPA 1 TABLET(S): 25; 100 TABLET ORAL at 19:27

## 2018-10-23 RX ADMIN — ATORVASTATIN CALCIUM 40 MILLIGRAM(S): 80 TABLET, FILM COATED ORAL at 22:05

## 2018-10-23 RX ADMIN — Medication 81 MILLIGRAM(S): at 13:00

## 2018-10-23 RX ADMIN — MAGNESIUM OXIDE 400 MG ORAL TABLET 400 MILLIGRAM(S): 241.3 TABLET ORAL at 08:22

## 2018-10-24 LAB
GLUCOSE BLDC GLUCOMTR-MCNC: 119 MG/DL — HIGH (ref 70–99)
GLUCOSE BLDC GLUCOMTR-MCNC: 207 MG/DL — HIGH (ref 70–99)

## 2018-10-24 RX ORDER — SODIUM CHLORIDE 0.65 %
1 AEROSOL, SPRAY (ML) NASAL
Qty: 0 | Refills: 0 | Status: DISCONTINUED | OUTPATIENT
Start: 2018-10-24 | End: 2018-10-24

## 2018-10-24 RX ORDER — FLUTICASONE PROPIONATE 50 MCG
1 SPRAY, SUSPENSION NASAL
Qty: 0 | Refills: 0 | Status: DISCONTINUED | OUTPATIENT
Start: 2018-10-24 | End: 2018-10-24

## 2018-10-24 RX ADMIN — Medication 100 MILLIGRAM(S): at 21:29

## 2018-10-24 RX ADMIN — Medication 1 TABLET(S): at 11:18

## 2018-10-24 RX ADMIN — Medication 100 MILLIGRAM(S): at 05:25

## 2018-10-24 RX ADMIN — METFORMIN HYDROCHLORIDE 500 MILLIGRAM(S): 850 TABLET ORAL at 16:56

## 2018-10-24 RX ADMIN — METFORMIN HYDROCHLORIDE 500 MILLIGRAM(S): 850 TABLET ORAL at 09:17

## 2018-10-24 RX ADMIN — LISINOPRIL 20 MILLIGRAM(S): 2.5 TABLET ORAL at 21:29

## 2018-10-24 RX ADMIN — CARBIDOPA AND LEVODOPA 1 TABLET(S): 25; 100 TABLET ORAL at 11:19

## 2018-10-24 RX ADMIN — CARVEDILOL PHOSPHATE 12.5 MILLIGRAM(S): 80 CAPSULE, EXTENDED RELEASE ORAL at 05:25

## 2018-10-24 RX ADMIN — ATORVASTATIN CALCIUM 40 MILLIGRAM(S): 80 TABLET, FILM COATED ORAL at 21:29

## 2018-10-24 RX ADMIN — Medication 40 MILLIGRAM(S): at 05:25

## 2018-10-24 RX ADMIN — RIVASTIGMINE 1 PATCH: 4.6 PATCH, EXTENDED RELEASE TRANSDERMAL at 13:20

## 2018-10-24 RX ADMIN — ENTACAPONE 200 MILLIGRAM(S): 200 TABLET, FILM COATED ORAL at 16:57

## 2018-10-24 RX ADMIN — ENOXAPARIN SODIUM 40 MILLIGRAM(S): 100 INJECTION SUBCUTANEOUS at 11:21

## 2018-10-24 RX ADMIN — RIVASTIGMINE 1 PATCH: 4.6 PATCH, EXTENDED RELEASE TRANSDERMAL at 11:19

## 2018-10-24 RX ADMIN — CARBIDOPA AND LEVODOPA 1 TABLET(S): 25; 100 TABLET ORAL at 19:53

## 2018-10-24 RX ADMIN — CARVEDILOL PHOSPHATE 12.5 MILLIGRAM(S): 80 CAPSULE, EXTENDED RELEASE ORAL at 16:57

## 2018-10-24 RX ADMIN — MAGNESIUM OXIDE 400 MG ORAL TABLET 400 MILLIGRAM(S): 241.3 TABLET ORAL at 11:26

## 2018-10-24 RX ADMIN — CARBIDOPA AND LEVODOPA 1 TABLET(S): 25; 100 TABLET ORAL at 08:11

## 2018-10-24 RX ADMIN — Medication 100 MILLIGRAM(S): at 11:21

## 2018-10-24 RX ADMIN — Medication 81 MILLIGRAM(S): at 11:19

## 2018-10-24 RX ADMIN — Medication 0.6 MILLIGRAM(S): at 11:23

## 2018-10-24 RX ADMIN — ENTACAPONE 200 MILLIGRAM(S): 200 TABLET, FILM COATED ORAL at 11:21

## 2018-10-24 RX ADMIN — CARBIDOPA AND LEVODOPA 1 TABLET(S): 25; 100 TABLET ORAL at 15:05

## 2018-10-24 RX ADMIN — MAGNESIUM OXIDE 400 MG ORAL TABLET 400 MILLIGRAM(S): 241.3 TABLET ORAL at 16:56

## 2018-10-24 RX ADMIN — Medication 200 MILLIGRAM(S): at 11:19

## 2018-10-24 RX ADMIN — ENTACAPONE 200 MILLIGRAM(S): 200 TABLET, FILM COATED ORAL at 19:53

## 2018-10-24 RX ADMIN — ENTACAPONE 200 MILLIGRAM(S): 200 TABLET, FILM COATED ORAL at 08:11

## 2018-10-24 RX ADMIN — MAGNESIUM OXIDE 400 MG ORAL TABLET 400 MILLIGRAM(S): 241.3 TABLET ORAL at 09:17

## 2018-10-25 LAB
GLUCOSE BLDC GLUCOMTR-MCNC: 118 MG/DL — HIGH (ref 70–99)
GLUCOSE BLDC GLUCOMTR-MCNC: 85 MG/DL — SIGNIFICANT CHANGE UP (ref 70–99)

## 2018-10-25 RX ADMIN — ENTACAPONE 200 MILLIGRAM(S): 200 TABLET, FILM COATED ORAL at 20:06

## 2018-10-25 RX ADMIN — MAGNESIUM OXIDE 400 MG ORAL TABLET 400 MILLIGRAM(S): 241.3 TABLET ORAL at 17:32

## 2018-10-25 RX ADMIN — Medication 0.6 MILLIGRAM(S): at 12:22

## 2018-10-25 RX ADMIN — ENOXAPARIN SODIUM 40 MILLIGRAM(S): 100 INJECTION SUBCUTANEOUS at 12:18

## 2018-10-25 RX ADMIN — Medication 100 MILLIGRAM(S): at 21:35

## 2018-10-25 RX ADMIN — Medication 81 MILLIGRAM(S): at 12:18

## 2018-10-25 RX ADMIN — MAGNESIUM OXIDE 400 MG ORAL TABLET 400 MILLIGRAM(S): 241.3 TABLET ORAL at 08:07

## 2018-10-25 RX ADMIN — Medication 1 TABLET(S): at 12:18

## 2018-10-25 RX ADMIN — CARBIDOPA AND LEVODOPA 1 TABLET(S): 25; 100 TABLET ORAL at 20:06

## 2018-10-25 RX ADMIN — CARBIDOPA AND LEVODOPA 1 TABLET(S): 25; 100 TABLET ORAL at 12:18

## 2018-10-25 RX ADMIN — METFORMIN HYDROCHLORIDE 500 MILLIGRAM(S): 850 TABLET ORAL at 08:07

## 2018-10-25 RX ADMIN — Medication 200 MILLIGRAM(S): at 12:17

## 2018-10-25 RX ADMIN — RIVASTIGMINE 1 PATCH: 4.6 PATCH, EXTENDED RELEASE TRANSDERMAL at 08:08

## 2018-10-25 RX ADMIN — CARBIDOPA AND LEVODOPA 1 TABLET(S): 25; 100 TABLET ORAL at 08:08

## 2018-10-25 RX ADMIN — CHLORHEXIDINE GLUCONATE 1 APPLICATION(S): 213 SOLUTION TOPICAL at 06:57

## 2018-10-25 RX ADMIN — LISINOPRIL 20 MILLIGRAM(S): 2.5 TABLET ORAL at 21:35

## 2018-10-25 RX ADMIN — CARVEDILOL PHOSPHATE 12.5 MILLIGRAM(S): 80 CAPSULE, EXTENDED RELEASE ORAL at 17:32

## 2018-10-25 RX ADMIN — CARVEDILOL PHOSPHATE 12.5 MILLIGRAM(S): 80 CAPSULE, EXTENDED RELEASE ORAL at 06:02

## 2018-10-25 RX ADMIN — ENTACAPONE 200 MILLIGRAM(S): 200 TABLET, FILM COATED ORAL at 08:07

## 2018-10-25 RX ADMIN — MAGNESIUM OXIDE 400 MG ORAL TABLET 400 MILLIGRAM(S): 241.3 TABLET ORAL at 12:19

## 2018-10-25 RX ADMIN — CARBIDOPA AND LEVODOPA 1 TABLET(S): 25; 100 TABLET ORAL at 17:32

## 2018-10-25 RX ADMIN — METFORMIN HYDROCHLORIDE 500 MILLIGRAM(S): 850 TABLET ORAL at 17:31

## 2018-10-25 RX ADMIN — ATORVASTATIN CALCIUM 40 MILLIGRAM(S): 80 TABLET, FILM COATED ORAL at 21:35

## 2018-10-25 RX ADMIN — ENTACAPONE 200 MILLIGRAM(S): 200 TABLET, FILM COATED ORAL at 12:19

## 2018-10-25 RX ADMIN — Medication 100 MILLIGRAM(S): at 06:03

## 2018-10-25 RX ADMIN — ENTACAPONE 200 MILLIGRAM(S): 200 TABLET, FILM COATED ORAL at 17:31

## 2018-10-26 LAB
ANION GAP SERPL CALC-SCNC: 12 MMOL/L — SIGNIFICANT CHANGE UP (ref 7–14)
BUN SERPL-MCNC: 16 MG/DL — SIGNIFICANT CHANGE UP (ref 10–20)
CALCIUM SERPL-MCNC: 9.4 MG/DL — SIGNIFICANT CHANGE UP (ref 8.5–10.1)
CHLORIDE SERPL-SCNC: 102 MMOL/L — SIGNIFICANT CHANGE UP (ref 98–110)
CO2 SERPL-SCNC: 28 MMOL/L — SIGNIFICANT CHANGE UP (ref 17–32)
CREAT SERPL-MCNC: 0.9 MG/DL — SIGNIFICANT CHANGE UP (ref 0.7–1.5)
GLUCOSE BLDC GLUCOMTR-MCNC: 167 MG/DL — HIGH (ref 70–99)
GLUCOSE BLDC GLUCOMTR-MCNC: 213 MG/DL — HIGH (ref 70–99)
GLUCOSE SERPL-MCNC: 147 MG/DL — HIGH (ref 70–99)
HCT VFR BLD CALC: 47.2 % — SIGNIFICANT CHANGE UP (ref 42–52)
HGB BLD-MCNC: 15.7 G/DL — SIGNIFICANT CHANGE UP (ref 14–18)
MAGNESIUM SERPL-MCNC: 2.2 MG/DL — SIGNIFICANT CHANGE UP (ref 1.8–2.4)
MCHC RBC-ENTMCNC: 31 PG — SIGNIFICANT CHANGE UP (ref 27–31)
MCHC RBC-ENTMCNC: 33.3 G/DL — SIGNIFICANT CHANGE UP (ref 32–37)
MCV RBC AUTO: 93.3 FL — SIGNIFICANT CHANGE UP (ref 80–94)
NRBC # BLD: 0 /100 WBCS — SIGNIFICANT CHANGE UP (ref 0–0)
PLATELET # BLD AUTO: 254 K/UL — SIGNIFICANT CHANGE UP (ref 130–400)
POTASSIUM SERPL-MCNC: 4.9 MMOL/L — SIGNIFICANT CHANGE UP (ref 3.5–5)
POTASSIUM SERPL-SCNC: 4.9 MMOL/L — SIGNIFICANT CHANGE UP (ref 3.5–5)
RBC # BLD: 5.06 M/UL — SIGNIFICANT CHANGE UP (ref 4.7–6.1)
RBC # FLD: 12.8 % — SIGNIFICANT CHANGE UP (ref 11.5–14.5)
SODIUM SERPL-SCNC: 142 MMOL/L — SIGNIFICANT CHANGE UP (ref 135–146)
WBC # BLD: 13.88 K/UL — HIGH (ref 4.8–10.8)
WBC # FLD AUTO: 13.88 K/UL — HIGH (ref 4.8–10.8)

## 2018-10-26 RX ADMIN — CARBIDOPA AND LEVODOPA 1 TABLET(S): 25; 100 TABLET ORAL at 20:30

## 2018-10-26 RX ADMIN — RIVASTIGMINE 1 PATCH: 4.6 PATCH, EXTENDED RELEASE TRANSDERMAL at 08:12

## 2018-10-26 RX ADMIN — CARVEDILOL PHOSPHATE 12.5 MILLIGRAM(S): 80 CAPSULE, EXTENDED RELEASE ORAL at 06:05

## 2018-10-26 RX ADMIN — LISINOPRIL 20 MILLIGRAM(S): 2.5 TABLET ORAL at 21:33

## 2018-10-26 RX ADMIN — Medication 100 MILLIGRAM(S): at 21:33

## 2018-10-26 RX ADMIN — ENTACAPONE 200 MILLIGRAM(S): 200 TABLET, FILM COATED ORAL at 16:52

## 2018-10-26 RX ADMIN — Medication 200 MILLIGRAM(S): at 12:24

## 2018-10-26 RX ADMIN — Medication 100 MILLIGRAM(S): at 06:05

## 2018-10-26 RX ADMIN — ENOXAPARIN SODIUM 40 MILLIGRAM(S): 100 INJECTION SUBCUTANEOUS at 12:25

## 2018-10-26 RX ADMIN — MAGNESIUM OXIDE 400 MG ORAL TABLET 400 MILLIGRAM(S): 241.3 TABLET ORAL at 08:13

## 2018-10-26 RX ADMIN — ENTACAPONE 200 MILLIGRAM(S): 200 TABLET, FILM COATED ORAL at 08:12

## 2018-10-26 RX ADMIN — ENTACAPONE 200 MILLIGRAM(S): 200 TABLET, FILM COATED ORAL at 12:24

## 2018-10-26 RX ADMIN — Medication 81 MILLIGRAM(S): at 12:24

## 2018-10-26 RX ADMIN — CARBIDOPA AND LEVODOPA 1 TABLET(S): 25; 100 TABLET ORAL at 16:52

## 2018-10-26 RX ADMIN — CARVEDILOL PHOSPHATE 12.5 MILLIGRAM(S): 80 CAPSULE, EXTENDED RELEASE ORAL at 20:30

## 2018-10-26 RX ADMIN — METFORMIN HYDROCHLORIDE 500 MILLIGRAM(S): 850 TABLET ORAL at 08:12

## 2018-10-26 RX ADMIN — MAGNESIUM OXIDE 400 MG ORAL TABLET 400 MILLIGRAM(S): 241.3 TABLET ORAL at 12:26

## 2018-10-26 RX ADMIN — CARBIDOPA AND LEVODOPA 1 TABLET(S): 25; 100 TABLET ORAL at 08:11

## 2018-10-26 RX ADMIN — Medication 100 MILLIGRAM(S): at 14:31

## 2018-10-26 RX ADMIN — MAGNESIUM OXIDE 400 MG ORAL TABLET 400 MILLIGRAM(S): 241.3 TABLET ORAL at 16:53

## 2018-10-26 RX ADMIN — Medication 1 TABLET(S): at 12:26

## 2018-10-26 RX ADMIN — Medication 0.6 MILLIGRAM(S): at 12:28

## 2018-10-26 RX ADMIN — METFORMIN HYDROCHLORIDE 500 MILLIGRAM(S): 850 TABLET ORAL at 16:53

## 2018-10-26 RX ADMIN — CHLORHEXIDINE GLUCONATE 1 APPLICATION(S): 213 SOLUTION TOPICAL at 06:05

## 2018-10-26 RX ADMIN — ATORVASTATIN CALCIUM 40 MILLIGRAM(S): 80 TABLET, FILM COATED ORAL at 21:32

## 2018-10-26 RX ADMIN — ENTACAPONE 200 MILLIGRAM(S): 200 TABLET, FILM COATED ORAL at 20:30

## 2018-10-26 RX ADMIN — CARBIDOPA AND LEVODOPA 1 TABLET(S): 25; 100 TABLET ORAL at 12:25

## 2018-10-27 LAB
GLUCOSE BLDC GLUCOMTR-MCNC: 91 MG/DL — SIGNIFICANT CHANGE UP (ref 70–99)
GLUCOSE BLDC GLUCOMTR-MCNC: 99 MG/DL — SIGNIFICANT CHANGE UP (ref 70–99)

## 2018-10-27 RX ADMIN — ENTACAPONE 200 MILLIGRAM(S): 200 TABLET, FILM COATED ORAL at 21:53

## 2018-10-27 RX ADMIN — Medication 100 MILLIGRAM(S): at 05:40

## 2018-10-27 RX ADMIN — CARBIDOPA AND LEVODOPA 1 TABLET(S): 25; 100 TABLET ORAL at 11:29

## 2018-10-27 RX ADMIN — ENOXAPARIN SODIUM 40 MILLIGRAM(S): 100 INJECTION SUBCUTANEOUS at 11:30

## 2018-10-27 RX ADMIN — METFORMIN HYDROCHLORIDE 500 MILLIGRAM(S): 850 TABLET ORAL at 08:04

## 2018-10-27 RX ADMIN — CARBIDOPA AND LEVODOPA 1 TABLET(S): 25; 100 TABLET ORAL at 08:03

## 2018-10-27 RX ADMIN — ENTACAPONE 200 MILLIGRAM(S): 200 TABLET, FILM COATED ORAL at 16:23

## 2018-10-27 RX ADMIN — CHLORHEXIDINE GLUCONATE 1 APPLICATION(S): 213 SOLUTION TOPICAL at 05:41

## 2018-10-27 RX ADMIN — CARBIDOPA AND LEVODOPA 1 TABLET(S): 25; 100 TABLET ORAL at 21:53

## 2018-10-27 RX ADMIN — CARVEDILOL PHOSPHATE 12.5 MILLIGRAM(S): 80 CAPSULE, EXTENDED RELEASE ORAL at 17:20

## 2018-10-27 RX ADMIN — ENTACAPONE 200 MILLIGRAM(S): 200 TABLET, FILM COATED ORAL at 08:03

## 2018-10-27 RX ADMIN — ATORVASTATIN CALCIUM 40 MILLIGRAM(S): 80 TABLET, FILM COATED ORAL at 21:53

## 2018-10-27 RX ADMIN — RIVASTIGMINE 1 PATCH: 4.6 PATCH, EXTENDED RELEASE TRANSDERMAL at 05:43

## 2018-10-27 RX ADMIN — Medication 100 MILLIGRAM(S): at 21:54

## 2018-10-27 RX ADMIN — ENTACAPONE 200 MILLIGRAM(S): 200 TABLET, FILM COATED ORAL at 11:30

## 2018-10-27 RX ADMIN — MAGNESIUM OXIDE 400 MG ORAL TABLET 400 MILLIGRAM(S): 241.3 TABLET ORAL at 12:10

## 2018-10-27 RX ADMIN — CARBIDOPA AND LEVODOPA 1 TABLET(S): 25; 100 TABLET ORAL at 16:22

## 2018-10-27 RX ADMIN — MAGNESIUM OXIDE 400 MG ORAL TABLET 400 MILLIGRAM(S): 241.3 TABLET ORAL at 08:09

## 2018-10-27 RX ADMIN — MAGNESIUM OXIDE 400 MG ORAL TABLET 400 MILLIGRAM(S): 241.3 TABLET ORAL at 17:20

## 2018-10-27 RX ADMIN — Medication 200 MILLIGRAM(S): at 11:28

## 2018-10-27 RX ADMIN — Medication 81 MILLIGRAM(S): at 11:28

## 2018-10-27 RX ADMIN — Medication 1 TABLET(S): at 11:31

## 2018-10-27 RX ADMIN — CARVEDILOL PHOSPHATE 12.5 MILLIGRAM(S): 80 CAPSULE, EXTENDED RELEASE ORAL at 05:40

## 2018-10-27 RX ADMIN — METFORMIN HYDROCHLORIDE 500 MILLIGRAM(S): 850 TABLET ORAL at 16:24

## 2018-10-27 RX ADMIN — Medication 0.6 MILLIGRAM(S): at 11:35

## 2018-10-27 RX ADMIN — RIVASTIGMINE 1 PATCH: 4.6 PATCH, EXTENDED RELEASE TRANSDERMAL at 08:08

## 2018-10-28 LAB
GLUCOSE BLDC GLUCOMTR-MCNC: 132 MG/DL — HIGH (ref 70–99)
GLUCOSE BLDC GLUCOMTR-MCNC: 83 MG/DL — SIGNIFICANT CHANGE UP (ref 70–99)

## 2018-10-28 RX ADMIN — ATORVASTATIN CALCIUM 40 MILLIGRAM(S): 80 TABLET, FILM COATED ORAL at 21:57

## 2018-10-28 RX ADMIN — Medication 100 MILLIGRAM(S): at 13:27

## 2018-10-28 RX ADMIN — RIVASTIGMINE 1 PATCH: 4.6 PATCH, EXTENDED RELEASE TRANSDERMAL at 18:14

## 2018-10-28 RX ADMIN — ENTACAPONE 200 MILLIGRAM(S): 200 TABLET, FILM COATED ORAL at 16:35

## 2018-10-28 RX ADMIN — Medication 100 MILLIGRAM(S): at 21:57

## 2018-10-28 RX ADMIN — Medication 1 TABLET(S): at 12:24

## 2018-10-28 RX ADMIN — CARVEDILOL PHOSPHATE 12.5 MILLIGRAM(S): 80 CAPSULE, EXTENDED RELEASE ORAL at 17:50

## 2018-10-28 RX ADMIN — MAGNESIUM OXIDE 400 MG ORAL TABLET 400 MILLIGRAM(S): 241.3 TABLET ORAL at 12:23

## 2018-10-28 RX ADMIN — MAGNESIUM OXIDE 400 MG ORAL TABLET 400 MILLIGRAM(S): 241.3 TABLET ORAL at 08:15

## 2018-10-28 RX ADMIN — CARBIDOPA AND LEVODOPA 1 TABLET(S): 25; 100 TABLET ORAL at 21:57

## 2018-10-28 RX ADMIN — CHLORHEXIDINE GLUCONATE 1 APPLICATION(S): 213 SOLUTION TOPICAL at 05:50

## 2018-10-28 RX ADMIN — RIVASTIGMINE 1 PATCH: 4.6 PATCH, EXTENDED RELEASE TRANSDERMAL at 18:13

## 2018-10-28 RX ADMIN — CARBIDOPA AND LEVODOPA 1 TABLET(S): 25; 100 TABLET ORAL at 13:28

## 2018-10-28 RX ADMIN — MAGNESIUM OXIDE 400 MG ORAL TABLET 400 MILLIGRAM(S): 241.3 TABLET ORAL at 16:36

## 2018-10-28 RX ADMIN — CARBIDOPA AND LEVODOPA 1 TABLET(S): 25; 100 TABLET ORAL at 16:36

## 2018-10-28 RX ADMIN — ENTACAPONE 200 MILLIGRAM(S): 200 TABLET, FILM COATED ORAL at 21:57

## 2018-10-28 RX ADMIN — ENTACAPONE 200 MILLIGRAM(S): 200 TABLET, FILM COATED ORAL at 08:13

## 2018-10-28 RX ADMIN — CARVEDILOL PHOSPHATE 12.5 MILLIGRAM(S): 80 CAPSULE, EXTENDED RELEASE ORAL at 05:49

## 2018-10-28 RX ADMIN — CARBIDOPA AND LEVODOPA 1 TABLET(S): 25; 100 TABLET ORAL at 08:13

## 2018-10-28 RX ADMIN — Medication 100 MILLIGRAM(S): at 05:49

## 2018-10-28 RX ADMIN — METFORMIN HYDROCHLORIDE 500 MILLIGRAM(S): 850 TABLET ORAL at 08:13

## 2018-10-28 RX ADMIN — ENOXAPARIN SODIUM 40 MILLIGRAM(S): 100 INJECTION SUBCUTANEOUS at 12:26

## 2018-10-28 RX ADMIN — Medication 81 MILLIGRAM(S): at 12:23

## 2018-10-28 RX ADMIN — RIVASTIGMINE 1 PATCH: 4.6 PATCH, EXTENDED RELEASE TRANSDERMAL at 08:15

## 2018-10-28 RX ADMIN — RIVASTIGMINE 1 PATCH: 4.6 PATCH, EXTENDED RELEASE TRANSDERMAL at 22:05

## 2018-10-28 RX ADMIN — Medication 200 MILLIGRAM(S): at 12:23

## 2018-10-28 RX ADMIN — Medication 0.6 MILLIGRAM(S): at 12:23

## 2018-10-28 RX ADMIN — METFORMIN HYDROCHLORIDE 500 MILLIGRAM(S): 850 TABLET ORAL at 16:36

## 2018-10-28 RX ADMIN — ENTACAPONE 200 MILLIGRAM(S): 200 TABLET, FILM COATED ORAL at 12:23

## 2018-10-29 LAB
ANION GAP SERPL CALC-SCNC: 10 MMOL/L — SIGNIFICANT CHANGE UP (ref 7–14)
BUN SERPL-MCNC: 16 MG/DL — SIGNIFICANT CHANGE UP (ref 10–20)
CALCIUM SERPL-MCNC: 8.6 MG/DL — SIGNIFICANT CHANGE UP (ref 8.5–10.1)
CHLORIDE SERPL-SCNC: 100 MMOL/L — SIGNIFICANT CHANGE UP (ref 98–110)
CO2 SERPL-SCNC: 26 MMOL/L — SIGNIFICANT CHANGE UP (ref 17–32)
CREAT SERPL-MCNC: 0.9 MG/DL — SIGNIFICANT CHANGE UP (ref 0.7–1.5)
GLUCOSE BLDC GLUCOMTR-MCNC: 121 MG/DL — HIGH (ref 70–99)
GLUCOSE BLDC GLUCOMTR-MCNC: 154 MG/DL — HIGH (ref 70–99)
GLUCOSE BLDC GLUCOMTR-MCNC: 98 MG/DL — SIGNIFICANT CHANGE UP (ref 70–99)
GLUCOSE SERPL-MCNC: 125 MG/DL — HIGH (ref 70–99)
HCT VFR BLD CALC: 42.7 % — SIGNIFICANT CHANGE UP (ref 42–52)
HGB BLD-MCNC: 14.4 G/DL — SIGNIFICANT CHANGE UP (ref 14–18)
MAGNESIUM SERPL-MCNC: 1.9 MG/DL — SIGNIFICANT CHANGE UP (ref 1.8–2.4)
MCHC RBC-ENTMCNC: 31.4 PG — HIGH (ref 27–31)
MCHC RBC-ENTMCNC: 33.7 G/DL — SIGNIFICANT CHANGE UP (ref 32–37)
MCV RBC AUTO: 93.2 FL — SIGNIFICANT CHANGE UP (ref 80–94)
NRBC # BLD: 0 /100 WBCS — SIGNIFICANT CHANGE UP (ref 0–0)
PLATELET # BLD AUTO: 194 K/UL — SIGNIFICANT CHANGE UP (ref 130–400)
POTASSIUM SERPL-MCNC: 4.1 MMOL/L — SIGNIFICANT CHANGE UP (ref 3.5–5)
POTASSIUM SERPL-SCNC: 4.1 MMOL/L — SIGNIFICANT CHANGE UP (ref 3.5–5)
RBC # BLD: 4.58 M/UL — LOW (ref 4.7–6.1)
RBC # FLD: 13 % — SIGNIFICANT CHANGE UP (ref 11.5–14.5)
SODIUM SERPL-SCNC: 136 MMOL/L — SIGNIFICANT CHANGE UP (ref 135–146)
WBC # BLD: 10.19 K/UL — SIGNIFICANT CHANGE UP (ref 4.8–10.8)
WBC # FLD AUTO: 10.19 K/UL — SIGNIFICANT CHANGE UP (ref 4.8–10.8)

## 2018-10-29 RX ADMIN — MAGNESIUM OXIDE 400 MG ORAL TABLET 400 MILLIGRAM(S): 241.3 TABLET ORAL at 11:48

## 2018-10-29 RX ADMIN — MAGNESIUM OXIDE 400 MG ORAL TABLET 400 MILLIGRAM(S): 241.3 TABLET ORAL at 08:17

## 2018-10-29 RX ADMIN — Medication 1 TABLET(S): at 11:48

## 2018-10-29 RX ADMIN — METFORMIN HYDROCHLORIDE 500 MILLIGRAM(S): 850 TABLET ORAL at 17:12

## 2018-10-29 RX ADMIN — CARBIDOPA AND LEVODOPA 1 TABLET(S): 25; 100 TABLET ORAL at 08:17

## 2018-10-29 RX ADMIN — LISINOPRIL 20 MILLIGRAM(S): 2.5 TABLET ORAL at 21:43

## 2018-10-29 RX ADMIN — CARBIDOPA AND LEVODOPA 1 TABLET(S): 25; 100 TABLET ORAL at 21:43

## 2018-10-29 RX ADMIN — ATORVASTATIN CALCIUM 40 MILLIGRAM(S): 80 TABLET, FILM COATED ORAL at 21:43

## 2018-10-29 RX ADMIN — ENTACAPONE 200 MILLIGRAM(S): 200 TABLET, FILM COATED ORAL at 08:17

## 2018-10-29 RX ADMIN — ENTACAPONE 200 MILLIGRAM(S): 200 TABLET, FILM COATED ORAL at 11:48

## 2018-10-29 RX ADMIN — ENOXAPARIN SODIUM 40 MILLIGRAM(S): 100 INJECTION SUBCUTANEOUS at 12:49

## 2018-10-29 RX ADMIN — Medication 81 MILLIGRAM(S): at 11:48

## 2018-10-29 RX ADMIN — ENTACAPONE 200 MILLIGRAM(S): 200 TABLET, FILM COATED ORAL at 21:43

## 2018-10-29 RX ADMIN — ENTACAPONE 200 MILLIGRAM(S): 200 TABLET, FILM COATED ORAL at 17:12

## 2018-10-29 RX ADMIN — CARBIDOPA AND LEVODOPA 1 TABLET(S): 25; 100 TABLET ORAL at 17:12

## 2018-10-29 RX ADMIN — RIVASTIGMINE 1 PATCH: 4.6 PATCH, EXTENDED RELEASE TRANSDERMAL at 08:17

## 2018-10-29 RX ADMIN — Medication 100 MILLIGRAM(S): at 21:43

## 2018-10-29 RX ADMIN — METFORMIN HYDROCHLORIDE 500 MILLIGRAM(S): 850 TABLET ORAL at 08:17

## 2018-10-29 RX ADMIN — CHLORHEXIDINE GLUCONATE 1 APPLICATION(S): 213 SOLUTION TOPICAL at 05:43

## 2018-10-29 RX ADMIN — Medication 100 MILLIGRAM(S): at 13:23

## 2018-10-29 RX ADMIN — CARBIDOPA AND LEVODOPA 1 TABLET(S): 25; 100 TABLET ORAL at 11:48

## 2018-10-29 RX ADMIN — MAGNESIUM OXIDE 400 MG ORAL TABLET 400 MILLIGRAM(S): 241.3 TABLET ORAL at 17:12

## 2018-10-29 RX ADMIN — Medication 100 MILLIGRAM(S): at 05:42

## 2018-10-29 RX ADMIN — RIVASTIGMINE 1 PATCH: 4.6 PATCH, EXTENDED RELEASE TRANSDERMAL at 21:43

## 2018-10-29 RX ADMIN — CARVEDILOL PHOSPHATE 12.5 MILLIGRAM(S): 80 CAPSULE, EXTENDED RELEASE ORAL at 05:42

## 2018-10-29 RX ADMIN — Medication 200 MILLIGRAM(S): at 11:48

## 2018-10-29 RX ADMIN — CARVEDILOL PHOSPHATE 12.5 MILLIGRAM(S): 80 CAPSULE, EXTENDED RELEASE ORAL at 17:12

## 2018-10-30 LAB
GLUCOSE BLDC GLUCOMTR-MCNC: 100 MG/DL — HIGH (ref 70–99)
GLUCOSE BLDC GLUCOMTR-MCNC: 167 MG/DL — HIGH (ref 70–99)

## 2018-10-30 RX ORDER — ATORVASTATIN CALCIUM 80 MG/1
1 TABLET, FILM COATED ORAL
Qty: 30 | Refills: 0
Start: 2018-10-30 | End: 2018-11-28

## 2018-10-30 RX ORDER — METFORMIN HYDROCHLORIDE 850 MG/1
1 TABLET ORAL
Qty: 60 | Refills: 0
Start: 2018-10-30 | End: 2018-11-28

## 2018-10-30 RX ORDER — CARVEDILOL PHOSPHATE 80 MG/1
1 CAPSULE, EXTENDED RELEASE ORAL
Qty: 60 | Refills: 0
Start: 2018-10-30 | End: 2018-11-28

## 2018-10-30 RX ORDER — ACETAMINOPHEN 500 MG
2 TABLET ORAL
Qty: 0 | Refills: 0 | DISCHARGE
Start: 2018-10-30

## 2018-10-30 RX ORDER — MAGNESIUM OXIDE 400 MG ORAL TABLET 241.3 MG
1 TABLET ORAL
Qty: 90 | Refills: 0
Start: 2018-10-30 | End: 2018-11-28

## 2018-10-30 RX ORDER — RIVASTIGMINE 4.6 MG/24H
1 PATCH, EXTENDED RELEASE TRANSDERMAL
Qty: 30 | Refills: 0
Start: 2018-10-30 | End: 2018-11-28

## 2018-10-30 RX ORDER — CARBIDOPA AND LEVODOPA 25; 100 MG/1; MG/1
1 TABLET ORAL
Qty: 120 | Refills: 0
Start: 2018-10-30 | End: 2018-11-28

## 2018-10-30 RX ORDER — LISINOPRIL 2.5 MG/1
1 TABLET ORAL
Qty: 30 | Refills: 0
Start: 2018-10-30 | End: 2018-11-28

## 2018-10-30 RX ORDER — ALLOPURINOL 300 MG
200 TABLET ORAL
Qty: 0 | Refills: 0 | COMMUNITY

## 2018-10-30 RX ORDER — ALLOPURINOL 300 MG
2 TABLET ORAL
Qty: 60 | Refills: 0
Start: 2018-10-30 | End: 2018-11-28

## 2018-10-30 RX ORDER — INFLUENZA VIRUS VACCINE 15; 15; 15; 15 UG/.5ML; UG/.5ML; UG/.5ML; UG/.5ML
0.5 SUSPENSION INTRAMUSCULAR ONCE
Qty: 0 | Refills: 0 | Status: DISCONTINUED | OUTPATIENT
Start: 2018-10-30 | End: 2018-10-30

## 2018-10-30 RX ORDER — ENTACAPONE 200 MG/1
1 TABLET, FILM COATED ORAL
Qty: 120 | Refills: 0
Start: 2018-10-30 | End: 2018-11-28

## 2018-10-30 RX ORDER — IBUPROFEN 200 MG
1 TABLET ORAL
Qty: 0 | Refills: 0 | DISCHARGE
Start: 2018-10-30

## 2018-10-30 RX ORDER — LISINOPRIL 2.5 MG/1
1 TABLET ORAL
Qty: 0 | Refills: 0 | DISCHARGE
Start: 2018-10-30 | End: 2018-11-28

## 2018-10-30 RX ADMIN — CARVEDILOL PHOSPHATE 12.5 MILLIGRAM(S): 80 CAPSULE, EXTENDED RELEASE ORAL at 06:09

## 2018-10-30 RX ADMIN — RIVASTIGMINE 1 PATCH: 4.6 PATCH, EXTENDED RELEASE TRANSDERMAL at 07:48

## 2018-10-30 RX ADMIN — MAGNESIUM OXIDE 400 MG ORAL TABLET 400 MILLIGRAM(S): 241.3 TABLET ORAL at 07:48

## 2018-10-30 RX ADMIN — Medication 1 TABLET(S): at 11:34

## 2018-10-30 RX ADMIN — ENTACAPONE 200 MILLIGRAM(S): 200 TABLET, FILM COATED ORAL at 07:48

## 2018-10-30 RX ADMIN — MAGNESIUM OXIDE 400 MG ORAL TABLET 400 MILLIGRAM(S): 241.3 TABLET ORAL at 11:34

## 2018-10-30 RX ADMIN — METFORMIN HYDROCHLORIDE 500 MILLIGRAM(S): 850 TABLET ORAL at 07:47

## 2018-10-30 RX ADMIN — Medication 81 MILLIGRAM(S): at 11:34

## 2018-10-30 RX ADMIN — Medication 200 MILLIGRAM(S): at 11:32

## 2018-10-30 RX ADMIN — RIVASTIGMINE 1 PATCH: 4.6 PATCH, EXTENDED RELEASE TRANSDERMAL at 06:08

## 2018-10-30 RX ADMIN — Medication 100 MILLIGRAM(S): at 06:09

## 2018-10-30 RX ADMIN — ENTACAPONE 200 MILLIGRAM(S): 200 TABLET, FILM COATED ORAL at 11:33

## 2018-10-30 RX ADMIN — CARBIDOPA AND LEVODOPA 1 TABLET(S): 25; 100 TABLET ORAL at 07:47

## 2018-10-30 RX ADMIN — CARBIDOPA AND LEVODOPA 1 TABLET(S): 25; 100 TABLET ORAL at 11:33

## 2018-10-30 RX ADMIN — ENOXAPARIN SODIUM 40 MILLIGRAM(S): 100 INJECTION SUBCUTANEOUS at 11:33

## 2018-11-13 DIAGNOSIS — M1A.9XX0 CHRONIC GOUT, UNSPECIFIED, WITHOUT TOPHUS (TOPHI): ICD-10-CM

## 2018-11-13 DIAGNOSIS — K59.00 CONSTIPATION, UNSPECIFIED: ICD-10-CM

## 2018-11-13 DIAGNOSIS — E11.9 TYPE 2 DIABETES MELLITUS WITHOUT COMPLICATIONS: ICD-10-CM

## 2018-11-13 DIAGNOSIS — F02.80 DEMENTIA IN OTHER DISEASES CLASSIFIED ELSEWHERE, UNSPECIFIED SEVERITY, WITHOUT BEHAVIORAL DISTURBANCE, PSYCHOTIC DISTURBANCE, MOOD DISTURBANCE, AND ANXIETY: ICD-10-CM

## 2018-11-13 DIAGNOSIS — Z79.84 LONG TERM (CURRENT) USE OF ORAL HYPOGLYCEMIC DRUGS: ICD-10-CM

## 2018-11-13 DIAGNOSIS — Z79.82 LONG TERM (CURRENT) USE OF ASPIRIN: ICD-10-CM

## 2018-11-13 DIAGNOSIS — R29.6 REPEATED FALLS: ICD-10-CM

## 2018-11-13 DIAGNOSIS — G20 PARKINSON'S DISEASE: ICD-10-CM

## 2018-11-13 DIAGNOSIS — M10.9 GOUT, UNSPECIFIED: ICD-10-CM

## 2018-11-13 DIAGNOSIS — Z86.73 PERSONAL HISTORY OF TRANSIENT ISCHEMIC ATTACK (TIA), AND CEREBRAL INFARCTION WITHOUT RESIDUAL DEFICITS: ICD-10-CM

## 2018-11-13 DIAGNOSIS — K21.9 GASTRO-ESOPHAGEAL REFLUX DISEASE WITHOUT ESOPHAGITIS: ICD-10-CM

## 2018-11-13 DIAGNOSIS — Z88.0 ALLERGY STATUS TO PENICILLIN: ICD-10-CM

## 2018-11-13 DIAGNOSIS — Z88.1 ALLERGY STATUS TO OTHER ANTIBIOTIC AGENTS STATUS: ICD-10-CM

## 2018-11-13 DIAGNOSIS — E78.5 HYPERLIPIDEMIA, UNSPECIFIED: ICD-10-CM

## 2018-11-13 DIAGNOSIS — N39.0 URINARY TRACT INFECTION, SITE NOT SPECIFIED: ICD-10-CM

## 2018-11-13 DIAGNOSIS — I10 ESSENTIAL (PRIMARY) HYPERTENSION: ICD-10-CM

## 2018-11-13 DIAGNOSIS — Z28.21 IMMUNIZATION NOT CARRIED OUT BECAUSE OF PATIENT REFUSAL: ICD-10-CM

## 2018-11-13 DIAGNOSIS — R42 DIZZINESS AND GIDDINESS: ICD-10-CM

## 2018-11-13 DIAGNOSIS — Z88.2 ALLERGY STATUS TO SULFONAMIDES: ICD-10-CM

## 2018-11-13 DIAGNOSIS — R47.01 APHASIA: ICD-10-CM

## 2018-11-13 DIAGNOSIS — F32.9 MAJOR DEPRESSIVE DISORDER, SINGLE EPISODE, UNSPECIFIED: ICD-10-CM

## 2018-12-26 ENCOUNTER — OUTPATIENT (OUTPATIENT)
Dept: OUTPATIENT SERVICES | Facility: HOSPITAL | Age: 68
LOS: 1 days | Discharge: HOME | End: 2018-12-26

## 2018-12-26 DIAGNOSIS — H81.49 VERTIGO OF CENTRAL ORIGIN, UNSPECIFIED EAR: ICD-10-CM

## 2018-12-26 DIAGNOSIS — G20 PARKINSON'S DISEASE: ICD-10-CM

## 2018-12-26 DIAGNOSIS — I69.00 UNSPECIFIED SEQUELAE OF NONTRAUMATIC SUBARACHNOID HEMORRHAGE: ICD-10-CM

## 2019-02-11 ENCOUNTER — OUTPATIENT (OUTPATIENT)
Dept: OUTPATIENT SERVICES | Facility: HOSPITAL | Age: 69
LOS: 1 days | Discharge: HOME | End: 2019-02-11

## 2019-02-11 DIAGNOSIS — G20 PARKINSON'S DISEASE: ICD-10-CM

## 2019-02-11 DIAGNOSIS — H81.49 VERTIGO OF CENTRAL ORIGIN, UNSPECIFIED EAR: ICD-10-CM

## 2019-02-11 DIAGNOSIS — I69.00 UNSPECIFIED SEQUELAE OF NONTRAUMATIC SUBARACHNOID HEMORRHAGE: ICD-10-CM

## 2019-04-13 PROBLEM — Z00.00 ENCOUNTER FOR PREVENTIVE HEALTH EXAMINATION: Status: ACTIVE | Noted: 2019-04-13

## 2019-06-10 NOTE — ED PROVIDER NOTE - CADM POA CENTRAL LINE
PRE-SEDATION ASSESSMENT    CONSENT  Consent for procedure and sedation obtained: Yes    MEDICAL HISTORY       PHYSICAL EXAM  History and Physical Reviewed: Patient has valid H&P within 30 days. I have reviewed and there are no changes.  Airway Anatomy : Class III  Heart : Normal  Lungs : Normal  LOC/Mental Status : Normal    OTHER FINDINGS  Reviewed current medications and allergies: Yes  Pertinent lab/diagnostic test reviewed: Yes    SEDATION RISK ASSESSMENT  Risk Status ASA: Class II - Normal patient with mild systemic disease  Plan for Sedation: Moderate Sedation  Indications for Procedure/Pre-Procedure Diagnosis and Planned Procedure: lumbar radiculpathy  EKG Monitoring: No    NARRATIVE FINDINGS     
No

## 2019-06-30 ENCOUNTER — INPATIENT (INPATIENT)
Facility: HOSPITAL | Age: 69
LOS: 4 days | Discharge: NURSING, REHAB OR LONG-TERM CARE | End: 2019-07-05
Attending: INTERNAL MEDICINE | Admitting: INTERNAL MEDICINE
Payer: MEDICARE

## 2019-06-30 VITALS
DIASTOLIC BLOOD PRESSURE: 113 MMHG | SYSTOLIC BLOOD PRESSURE: 180 MMHG | TEMPERATURE: 99 F | HEART RATE: 100 BPM | RESPIRATION RATE: 18 BRPM | OXYGEN SATURATION: 99 %

## 2019-06-30 LAB
ALBUMIN SERPL ELPH-MCNC: 3.6 G/DL — SIGNIFICANT CHANGE UP (ref 3.5–5.2)
ALP SERPL-CCNC: 84 U/L — SIGNIFICANT CHANGE UP (ref 30–115)
ALT FLD-CCNC: 33 U/L — SIGNIFICANT CHANGE UP (ref 0–41)
AMMONIA BLD-MCNC: 32 UMOL/L — SIGNIFICANT CHANGE UP (ref 11–55)
AMMONIA BLD-MCNC: 77 UMOL/L — HIGH (ref 11–55)
ANION GAP SERPL CALC-SCNC: 13 MMOL/L — SIGNIFICANT CHANGE UP (ref 7–14)
APPEARANCE UR: CLEAR — SIGNIFICANT CHANGE UP
APTT BLD: 26.2 SEC — LOW (ref 27–39.2)
AST SERPL-CCNC: 20 U/L — SIGNIFICANT CHANGE UP (ref 0–41)
BASOPHILS # BLD AUTO: 0.03 K/UL — SIGNIFICANT CHANGE UP (ref 0–0.2)
BASOPHILS NFR BLD AUTO: 0.2 % — SIGNIFICANT CHANGE UP (ref 0–1)
BILIRUB SERPL-MCNC: 1.3 MG/DL — HIGH (ref 0.2–1.2)
BILIRUB UR-MCNC: NEGATIVE — SIGNIFICANT CHANGE UP
BUN SERPL-MCNC: 10 MG/DL — SIGNIFICANT CHANGE UP (ref 10–20)
CALCIUM SERPL-MCNC: 8.6 MG/DL — SIGNIFICANT CHANGE UP (ref 8.5–10.1)
CHLORIDE SERPL-SCNC: 107 MMOL/L — SIGNIFICANT CHANGE UP (ref 98–110)
CHOLEST SERPL-MCNC: 111 MG/DL — SIGNIFICANT CHANGE UP (ref 100–200)
CO2 SERPL-SCNC: 24 MMOL/L — SIGNIFICANT CHANGE UP (ref 17–32)
COLOR SPEC: YELLOW — SIGNIFICANT CHANGE UP
CREAT SERPL-MCNC: 1 MG/DL — SIGNIFICANT CHANGE UP (ref 0.7–1.5)
DIFF PNL FLD: NEGATIVE — SIGNIFICANT CHANGE UP
EOSINOPHIL # BLD AUTO: 0.07 K/UL — SIGNIFICANT CHANGE UP (ref 0–0.7)
EOSINOPHIL NFR BLD AUTO: 0.5 % — SIGNIFICANT CHANGE UP (ref 0–8)
ETHANOL SERPL-MCNC: <10 MG/DL — SIGNIFICANT CHANGE UP
GLUCOSE BLDC GLUCOMTR-MCNC: 83 MG/DL — SIGNIFICANT CHANGE UP (ref 70–99)
GLUCOSE SERPL-MCNC: 84 MG/DL — SIGNIFICANT CHANGE UP (ref 70–99)
GLUCOSE UR QL: NEGATIVE MG/DL — SIGNIFICANT CHANGE UP
HCT VFR BLD CALC: 44.5 % — SIGNIFICANT CHANGE UP (ref 42–52)
HDLC SERPL-MCNC: 50 MG/DL — SIGNIFICANT CHANGE UP
HGB BLD-MCNC: 15.3 G/DL — SIGNIFICANT CHANGE UP (ref 14–18)
IMM GRANULOCYTES NFR BLD AUTO: 0.4 % — HIGH (ref 0.1–0.3)
INR BLD: 1.04 RATIO — SIGNIFICANT CHANGE UP (ref 0.65–1.3)
KETONES UR-MCNC: NEGATIVE — SIGNIFICANT CHANGE UP
LEUKOCYTE ESTERASE UR-ACNC: NEGATIVE — SIGNIFICANT CHANGE UP
LIPID PNL WITH DIRECT LDL SERPL: 55 MG/DL — SIGNIFICANT CHANGE UP (ref 4–129)
LYMPHOCYTES # BLD AUTO: 15.8 % — LOW (ref 20.5–51.1)
LYMPHOCYTES # BLD AUTO: 2.08 K/UL — SIGNIFICANT CHANGE UP (ref 1.2–3.4)
MCHC RBC-ENTMCNC: 32.8 PG — HIGH (ref 27–31)
MCHC RBC-ENTMCNC: 34.4 G/DL — SIGNIFICANT CHANGE UP (ref 32–37)
MCV RBC AUTO: 95.5 FL — HIGH (ref 80–94)
MONOCYTES # BLD AUTO: 1.06 K/UL — HIGH (ref 0.1–0.6)
MONOCYTES NFR BLD AUTO: 8 % — SIGNIFICANT CHANGE UP (ref 1.7–9.3)
NEUTROPHILS # BLD AUTO: 9.91 K/UL — HIGH (ref 1.4–6.5)
NEUTROPHILS NFR BLD AUTO: 75.1 % — SIGNIFICANT CHANGE UP (ref 42.2–75.2)
NITRITE UR-MCNC: NEGATIVE — SIGNIFICANT CHANGE UP
NRBC # BLD: 0 /100 WBCS — SIGNIFICANT CHANGE UP (ref 0–0)
PH UR: 7 — SIGNIFICANT CHANGE UP (ref 5–8)
PLATELET # BLD AUTO: 150 K/UL — SIGNIFICANT CHANGE UP (ref 130–400)
POTASSIUM SERPL-MCNC: 3.9 MMOL/L — SIGNIFICANT CHANGE UP (ref 3.5–5)
POTASSIUM SERPL-SCNC: 3.9 MMOL/L — SIGNIFICANT CHANGE UP (ref 3.5–5)
PROT SERPL-MCNC: 6 G/DL — SIGNIFICANT CHANGE UP (ref 6–8)
PROT UR-MCNC: NEGATIVE MG/DL — SIGNIFICANT CHANGE UP
PROTHROM AB SERPL-ACNC: 11.9 SEC — SIGNIFICANT CHANGE UP (ref 9.95–12.87)
RBC # BLD: 4.66 M/UL — LOW (ref 4.7–6.1)
RBC # FLD: 12.5 % — SIGNIFICANT CHANGE UP (ref 11.5–14.5)
SODIUM SERPL-SCNC: 144 MMOL/L — SIGNIFICANT CHANGE UP (ref 135–146)
SP GR SPEC: 1.01 — SIGNIFICANT CHANGE UP (ref 1.01–1.03)
TOTAL CHOLESTEROL/HDL RATIO MEASUREMENT: 2.2 RATIO — LOW (ref 4–5.5)
TRIGL SERPL-MCNC: 75 MG/DL — SIGNIFICANT CHANGE UP (ref 10–149)
TROPONIN T SERPL-MCNC: <0.01 NG/ML — SIGNIFICANT CHANGE UP
UROBILINOGEN FLD QL: 0.2 MG/DL — SIGNIFICANT CHANGE UP (ref 0.2–0.2)
WBC # BLD: 13.2 K/UL — HIGH (ref 4.8–10.8)
WBC # FLD AUTO: 13.2 K/UL — HIGH (ref 4.8–10.8)

## 2019-06-30 PROCEDURE — 70450 CT HEAD/BRAIN W/O DYE: CPT | Mod: 26

## 2019-06-30 PROCEDURE — 71045 X-RAY EXAM CHEST 1 VIEW: CPT | Mod: 26

## 2019-06-30 PROCEDURE — 93010 ELECTROCARDIOGRAM REPORT: CPT

## 2019-06-30 PROCEDURE — 99285 EMERGENCY DEPT VISIT HI MDM: CPT

## 2019-06-30 RX ORDER — CARVEDILOL PHOSPHATE 80 MG/1
12.5 CAPSULE, EXTENDED RELEASE ORAL EVERY 12 HOURS
Refills: 0 | Status: DISCONTINUED | OUTPATIENT
Start: 2019-06-30 | End: 2019-07-05

## 2019-06-30 RX ORDER — LISINOPRIL 2.5 MG/1
20 TABLET ORAL DAILY
Refills: 0 | Status: DISCONTINUED | OUTPATIENT
Start: 2019-06-30 | End: 2019-07-01

## 2019-06-30 RX ORDER — SODIUM CHLORIDE 9 MG/ML
1000 INJECTION, SOLUTION INTRAVENOUS ONCE
Refills: 0 | Status: COMPLETED | OUTPATIENT
Start: 2019-06-30 | End: 2019-06-30

## 2019-06-30 RX ADMIN — LISINOPRIL 20 MILLIGRAM(S): 2.5 TABLET ORAL at 21:45

## 2019-06-30 RX ADMIN — CARVEDILOL PHOSPHATE 12.5 MILLIGRAM(S): 80 CAPSULE, EXTENDED RELEASE ORAL at 21:45

## 2019-06-30 RX ADMIN — SODIUM CHLORIDE 1000 MILLILITER(S): 9 INJECTION, SOLUTION INTRAVENOUS at 20:15

## 2019-06-30 NOTE — ED PROVIDER NOTE - OBJECTIVE STATEMENT
68 Y/O M HTN, DM, GOUT, PARKSINSONS DISEASE, CVA (1998, 2016 WITH RESIDUAL EXPRESSIVE APHASIA AND COGNITIVE DEFICITS0, VERTIGO BROUGHT TO ED BY LEGAL GUARDIAN AFTER SHE NOTED PT WITH CONFUSION AND SLURRED SPEECH THIS AFTERNOON. PT WAS LAST SEEN WELL 2 NIGHTS AGO AND WAS IN HIS USUAL STATE OF HEALTH YESTERDAY WHEN FACE TIMING WITH GUARDIAN AT 6:30PM. AT THAT TIME, PT HAD BEEN C/O "FEELING TIRED." TODAY, 68 Y/O M HTN, DM, GOUT, PARKSINSONS DISEASE, CVA (1998, 2016 WITH RESIDUAL EXPRESSIVE APHASIA AND COGNITIVE DEFICITS0, VERTIGO BROUGHT TO ED BY LEGAL GUARDIAN AFTER SHE NOTED PT WITH CONFUSION AND SLURRED SPEECH THIS AFTERNOON. PT WAS LAST SEEN WELL 2 NIGHTS AGO AND WAS IN HIS USUAL STATE OF HEALTH YESTERDAY WHEN FACE TIMING WITH GUARDIAN AT 6:30PM. AT THAT TIME, PT HAD BEEN C/O "FEELING TIRED." TODAY, PT C/O CP EARLIER AS PER FRIEND. PT UNABLE TO GIVE HX DUE TO CONFUSION.

## 2019-06-30 NOTE — ED PROVIDER NOTE - CLINICAL SUMMARY MEDICAL DECISION MAKING FREE TEXT BOX
70 Y/O M PMHX AS DOCUMENTED BROUGHT TO ED WITH CONFUSION AND SLURRED SPEECH NOTED THE DAY OF PRESENTATION. LAST KNOWN NORMAL WAS 2 DAY PRIOR. ALL DIAGNOSTIC TESTING REVIEWED. CT HEAD WITH ACUTE PATHOLOGY. NEUROLOGY CONSULTED, PT ADMITTED FOR FURTHER EVALUATION.

## 2019-06-30 NOTE — H&P ADULT - NSICDXPASTMEDICALHX_GEN_ALL_CORE_FT
PAST MEDICAL HISTORY:  CVA (cerebral vascular accident)     Diabetes mellitus     Gout     HTN (hypertension)     Parkinson disease

## 2019-06-30 NOTE — ED ADULT NURSE NOTE - NSIMPLEMENTINTERV_GEN_ALL_ED
Implemented All Fall with Harm Risk Interventions:  [place] to call system. Call bell, personal items and telephone within reach. Instruct patient to call for assistance. Room bathroom lighting operational. Non-slip footwear when patient is off stretcher. Physically safe environment: no spills, clutter or unnecessary equipment. Stretcher in lowest position, wheels locked, appropriate side rails in place. Provide visual cue, wrist band, yellow gown, etc. Monitor gait and stability. Monitor for mental status changes and reorient to person, place, and time. Review medications for side effects contributing to fall risk. Reinforce activity limits and safety measures with patient and family. Provide visual clues: red socks.

## 2019-06-30 NOTE — ED PROVIDER NOTE - PROGRESS NOTE DETAILS
CASE D/W DR. SIBLEY, NEURO WILL EVALUATE PT. AS PER FRIEND AT BEDSIDE, PT REMAINS ALTERED, WITH INCREASED CONFUSION. NEURO AWARE. Dr. Cevallos suggests calling Neuro NP in house to assess patient.   Attempted to reach NP multiple times without answer.  Will relay message to MAR.

## 2019-06-30 NOTE — H&P ADULT - ATTENDING COMMENTS
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    As per pt's cartaker (Caroline fennel), pt was last seen well on Friday night. On saturday, she called the patient, but he was confused, and not responding appropriately to questions. She went to see him, and found him with "slurred speech", not responding to her questions, and clutching his chest. He was also complaining of l-sided chest tightness as per the caretaker. He has not taken any his medications for 2 days. He lives alone at home, with carteker living nearby and frequently visiting. He ambulates with a roll-aid. The caretaker organizes his meds for him, and he takes them independently.   Upon interviewing, pt is responding to simple questions, but only AAOx1 and not able to find his words. He endorses bilateral chest tightness but is not able to elaborate with more details. He does not know why he is in the hospital, and he denies fevers, chills, sob, lightheadedness, dizziness, abdominal pain, nausea, vomiting, change in bowel or urinary habits.    In the ED, /113, , T99, RR 18 satting 99% on RA.  CT Head was negative for acute pathology. EKG showed NSR with L anterior fasicular block.   He received Carvedilol 12.5mg and Lisinopril 20mg while in the ED.      REVIEW OF SYSTEMS: see cc/HPI  CONSTITUTIONAL: No weakness, fevers or chills  EYES/ENT: No visual changes;  No vertigo or throat pain   NECK: No pain or stiffness  RESPIRATORY: No cough, wheezing, hemoptysis; No shortness of breath  CARDIOVASCULAR: No chest pain or palpitations  GASTROINTESTINAL: No abdominal or epigastric pain. No nausea, vomiting, or hematemesis; No diarrhea or constipation. No melena or hematochezia.  GENITOURINARY: No dysuria, frequency or hematuria  NEUROLOGICAL: No numbness or weakness  SKIN: No itching, rashes    Physical Exam:  General: WN/WD NAD  Neurology: A&Ox1, follows commands, aphasia  Eyes: PERRLA/ EOMI  ENT/Neck: Neck supple, trachea midline, No JVD  Respiratory: CTA B/L, No wheezing, rales, rhonchi  CV: Normal rate regular rhythm, S1S2, no murmurs, rubs or gallops  Abdominal: Soft, NT, ND +BS,   Extremities: No edema, + peripheral pulses  Skin: No Rashes, Hematoma, Ecchymosis  Incisions: n/a  Tubes: n/a    A/P 70 y/o M w/ past medical history of CVA (1998, and 2016 with residual expressive aphasia and cognitive deficits), Parkinson's disease,  hypertension, DM II, Gout brought in for change in mental status.    As per pt's cartaker (Caroline fennel), pt was last seen well on Friday night. On saturday, she called the patient, but he was confused, and not responding appropriately to questions. She went to see him, and found him with "slurred speech", not responding to her questions, and clutching his chest. He was also complaining of l-sided chest tightness as per the caretaker. He has not taken any his medications for 2 days. He lives alone at home, with carteker living nearby and frequently visiting. He ambulates with a roll-aid. The caretaker organizes his meds for him, and he takes them independently.   Upon interviewing, pt is responding to simple questions, but only AAOx1 and not able to find his words. He endorses bilateral chest tightness but is not able to elaborate with more details. He does not know why he is in the hospital, and he denies fevers, chills, sob, lightheadedness, dizziness, abdominal pain, nausea, vomiting, change in bowel or urinary habits.    In the ED, /113, , T99, RR 18 satting 99% on RA.  CT Head was negative for acute pathology. EKG showed NSR with L anterior fasicular block.   He received Carvedilol 12.5mg and Lisinopril 20mg while in the ED.      REVIEW OF SYSTEMS: see cc/HPI  CONSTITUTIONAL: No weakness, fevers or chills  EYES/ENT: No visual changes;  No vertigo or throat pain   NECK: No pain or stiffness  RESPIRATORY: No cough, wheezing, hemoptysis; No shortness of breath  CARDIOVASCULAR: No chest pain or palpitations  GASTROINTESTINAL: No abdominal or epigastric pain. No nausea, vomiting, or hematemesis; No diarrhea or constipation. No melena or hematochezia.  GENITOURINARY: No dysuria, frequency or hematuria  NEUROLOGICAL: No numbness or weakness  SKIN: No itching, rashes    Physical Exam:  General: WN/WD NAD  Neurology: A&Ox1, follows commands, aphasia  Eyes: PERRLA/ EOMI  ENT/Neck: Neck supple, trachea midline, No JVD  Respiratory: CTA B/L, No wheezing, rales, rhonchi  CV: Normal rate regular rhythm, S1S2, no murmurs, rubs or gallops  Abdominal: Soft, NT, ND +BS,   Extremities: No edema, + peripheral pulses  Skin: No Rashes, Hematoma, Ecchymosis  Incisions: n/a  Tubes: n/a    A/P  AMS  Bilateral CP   h/o CVA   PD  HTN  DM type II 70 y/o M w/ past medical history of CVA (1998, and 2016 with residual expressive aphasia and cognitive deficits), Parkinson's disease,  hypertension, DM II, Gout brought in for change in mental status.    As per pt's cartaker (Caroline fennel), pt was last seen well on Friday night. On saturday, she called the patient, but he was confused, and not responding appropriately to questions. She went to see him, and found him with "slurred speech", not responding to her questions, and clutching his chest. He was also complaining of l-sided chest tightness as per the caretaker. He has not taken any his medications for 2 days. He lives alone at home, with carteker living nearby and frequently visiting. He ambulates with a roll-aid. The caretaker organizes his meds for him, and he takes them independently.   Upon interviewing, pt is responding to simple questions, but only AAOx1 and not able to find his words. He endorses bilateral chest tightness but is not able to elaborate with more details. He does not know why he is in the hospital, and he denies fevers, chills, sob, lightheadedness, dizziness, abdominal pain, nausea, vomiting, change in bowel or urinary habits.    In the ED, /113, , T99, RR 18 satting 99% on RA.  CT Head was negative for acute pathology. EKG showed NSR with L anterior fasicular block.   He received Carvedilol 12.5mg and Lisinopril 20mg while in the ED.      REVIEW OF SYSTEMS: see cc/HPI  CONSTITUTIONAL: No weakness, fevers or chills  EYES/ENT: No visual changes;  No vertigo or throat pain   NECK: No pain or stiffness  RESPIRATORY: No cough, wheezing, hemoptysis; No shortness of breath  CARDIOVASCULAR: No chest pain or palpitations  GASTROINTESTINAL: No abdominal or epigastric pain. No nausea, vomiting, or hematemesis; No diarrhea or constipation. No melena or hematochezia.  GENITOURINARY: No dysuria, frequency or hematuria  NEUROLOGICAL: No numbness or weakness  SKIN: No itching, rashes    Physical Exam:  General: WN/WD NAD  Neurology: A&Ox1, follows commands, aphasia, no   Eyes: PERRLA/ EOMI  ENT/Neck: Neck supple, trachea midline, No JVD  Respiratory: CTA B/L, No wheezing, rales, rhonchi  CV: Normal rate regular rhythm, S1S2, no murmurs, rubs or gallops  Abdominal: Soft, NT, ND +BS,   Extremities: No edema, + peripheral pulses  Skin: No Rashes, Hematoma, Ecchymosis  Incisions: n/a  Tubes: n/a    A/P  Change in mental status and slurred speech r/o worsening PD r/o CVA r/o metabolic encephalopathy 2/2 hypertensive urgency  -TSH, B12, RPR, Folate,   -c/w PD and HTN Rx ( missed doses recently )  -check UCx and Blood Cx  -repeat in AM   -Neurology eval     Atypical CP - description poor   -admit to tele   -serial CE and EKG  -2D echo   -if above abnormal  ---> Cardiology    DM type II  -F/S monitoring and PRN sliding scale     CVA/ PD  -as above     Limited mobility   - PT/ Rehab eval     DVT prophylaxis     Dispo- social service - patient lives alone

## 2019-06-30 NOTE — H&P ADULT - NSHPLABSRESULTS_GEN_ALL_CORE
15.3   13.20 )-----------( 150      ( 30 Jun 2019 18:30 )             44.5       06-30    144  |  107  |  10  ----------------------------<  84  3.9   |  24  |  1.0    Ca    8.6      30 Jun 2019 18:30    TPro  6.0  /  Alb  3.6  /  TBili  1.3<H>  /  DBili  x   /  AST  20  /  ALT  33  /  AlkPhos  84  06-30      CARDIAC MARKERS ( 30 Jun 2019 18:30 )  x     / <0.01 ng/mL / x     / x     / x          EKG NSR with L anterior fasicular block.     < from: CT Brain Stroke Protocol (06.30.19 @ 18:57) >    IMPRESSION:     No CT evidence of acute intracranial pathology. Stable examination since   10/10/2018.    Moderate chronic microvascular ischemic changes and chronic lacunar   infarcts as above.    < end of copied text >

## 2019-06-30 NOTE — H&P ADULT - NSHPPHYSICALEXAM_GEN_ALL_CORE
Vital Signs Last 24 Hrs  T(C): 37.3 (30 Jun 2019 23:48), Max: 37.4 (30 Jun 2019 20:10)  T(F): 99.1 (30 Jun 2019 23:48), Max: 99.4 (30 Jun 2019 20:10)  HR: 74 (30 Jun 2019 23:48) (74 - 100)  BP: 153/84 (30 Jun 2019 23:48) (153/84 - 186/110)  BP(mean): --  RR: 18 (30 Jun 2019 23:48) (18 - 18)  SpO2: 96% (30 Jun 2019 23:48) (96% - 99%)    General: slightly agitated, drowsy, minimally responding to questions.  HEENT: PERRL. Moist mucos membranes  Cardiovascular: Regular rate and rhythm. S1S2 appreciated. No murmurs.  Chest: Clear breath sounds bilaterally. no wheezing or rhonchi  Abdomen: soft, non-tender, non-distended.  Extremities: no edema or cyanosis bilaterally  Neurology: Motor 5/5 throughout. sensation intact. Expressive aphasia  Psych: AAOx1

## 2019-06-30 NOTE — H&P ADULT - HISTORY OF PRESENT ILLNESS
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    As per pt's cartaker (Caroline fennel), pt was last seen well on Friday night. On saturday, she called the patient, but he was confused, and not responding appropriately to questions. She went to see him, and found him with "slurred speech", not responding to her questions, and clutching his chest. He was also complaining of l-sided chest tightness as per the caretaker. He has not taken any his medications for 2 days. He lives alone at home, with carteker living nearby and frequently visiting. He ambulates with a roll-aid. The caretaker organizes his meds for him, and he takes them independently.   Upon interviewing, pt is responding to simple questions, but only AAOx1 and not able to find his words. He endorses bilateral chest tightness but is not able to elaborate with more details. He does not know why he is in the hospital, and he denies fevers, chills, sob, lightheadedness, dizziness, abdominal pain, nausea, vomiting, change in bowel or urinary habits.    In the ED, /113, , T99, RR 18 satting 99% on RA.  CT Head was negative for acute pathology. EKG showed NSR with L anterior fasicular block.   He received Carvedilol 12.5mg and Lisinopril 20mg while in the ED.

## 2019-06-30 NOTE — H&P ADULT - ASSESSMENT
70 y/o M w/ past medical history of CVA (1998, and 2016 with residual expressive aphasia and cognitive deficits), Parkinson's disease,  hypertension, DM II, Gout brought in for acute change in mental status.    #Acute change in mental status  - Differential Dx includes cerebrovascular accident vs infectious etiology vs Metabolic encephalopathy  - CTH: No CT evidence of acute intracranial pathology.  - Rpt CTH in 24hrs.  - F/u TSH, B12, folate. Urine drug screen  - F/u U/A (ordered). CXR -ve for acute pathology  -Ammonia elevated on admission, repeat is wnl. f/u AM level. Will order RUQ sono.  - Consider neuro eval if etiology unclear, or if repeat CTH is positive.    #Hypertensive urgency due to missed medication doses - improving  - SBP 180s in the ED, improved after starting home meds  - C/w Carvedilol 12.5 mg q12hr and Lisinopril 20mg q12hr.    #Bilateral chest pain - likely costochondritis  - Cardiac etiology of pain is unlikely  - EKG: NSR with L anterior fasicular block.   - Trops -ve x1. F/u Trops 4.30 and 11 AM  - Tylenol for pain control     #Cerebrovascular accident (2016)  - C/w ASA 81mg and Lipitor 40mg  - Follow repeat CTH tomorrow  - Pt follows up with Dr. Chavez as outpt    #Parkinson's disease  - Pt is functional, walks with RollAid at home.  - C/w Carbidopa-Levodopa  q6hr, ENtacapone 200mg q6hr, and Rivastigmine 9mg transdermal patch    #DM II  - FS qAC and qHS  Start insulin sliding scale if FS>180    Diet: DASH/ CC  Prophylaxis: Lovenox SQ    #Dispo: Home when medically stable    #Code Status: Full

## 2019-06-30 NOTE — ED ADULT NURSE REASSESSMENT NOTE - NS ED NURSE REASSESS COMMENT FT1
pt A&Ox1, disoriented to time, place and situation. pt is able to follow commands. passed dysphagia screening. pt on cardiac monitor. MD aware of VS, states she is OK with elevated BP. pt's linen changed, care provided, pt made comfortable.

## 2019-06-30 NOTE — ED PROVIDER NOTE - CARE PLAN
Principal Discharge DX:	Encephalopathy  Secondary Diagnosis:	History of CVA with residual deficit  Secondary Diagnosis:	Chest pain

## 2019-06-30 NOTE — ED ADULT NURSE NOTE - OBJECTIVE STATEMENT
altered mental status, last known well was yesterday, according to guardian "he usually has difficulty finding words but not like this"

## 2019-06-30 NOTE — ED PROVIDER NOTE - PHYSICAL EXAMINATION
CONSTITUTIONAL: Well-appearing; well-nourished; in no apparent distress.   HEAD: Normocephalic; atraumatic.   EYES: PERRL; EOM intact. Conjunctiva normal B/L.   ENT: Normal pharynx with no tonsillar hypertrophy. MMM.  NECK: Supple; non-tender; no cervical lymphadenopathy.   CHEST: Normal chest excursion with respiration.   CARDIOVASCULAR: Normal S1, S2; no murmurs, rubs, or gallops.   RESPIRATORY: Normal chest excursion with respiration; breath sounds clear and equal bilaterally; no wheezes, rhonchi, or rales.  GI/: Normal bowel sounds; non-distended; non-tender.  BACK: No evidence of trauma or deformity. Non-tender to palpation. No CVA tenderness.   EXT: Normal ROM in all four extremities; non-tender to palpation; distal pulses are normal. No leg edema B/L.   SKIN: Normal for age and race; warm; dry; good turgor.  NEURO: Alert and oriented to person only. No facial droop. CN 2-12 intact. No slurred speech. 5/5 motor strength B/L upper and B/L lower ext. No pronator drift. Normal finger to nose B/L.

## 2019-06-30 NOTE — ED ADULT NURSE NOTE - CHIEF COMPLAINT QUOTE
As per guardian, patient is more confused, has hx of strokes, is alert and oriented x2, last known well was yesterday at 6pm took patient to critical care section.

## 2019-07-01 LAB
ALBUMIN SERPL ELPH-MCNC: 3.5 G/DL — SIGNIFICANT CHANGE UP (ref 3.5–5.2)
ALP SERPL-CCNC: 83 U/L — SIGNIFICANT CHANGE UP (ref 30–115)
ALT FLD-CCNC: 26 U/L — SIGNIFICANT CHANGE UP (ref 0–41)
AMMONIA BLD-MCNC: 37 UMOL/L — SIGNIFICANT CHANGE UP (ref 11–55)
ANION GAP SERPL CALC-SCNC: 13 MMOL/L — SIGNIFICANT CHANGE UP (ref 7–14)
AST SERPL-CCNC: 20 U/L — SIGNIFICANT CHANGE UP (ref 0–41)
BILIRUB DIRECT SERPL-MCNC: 0.4 MG/DL — HIGH (ref 0–0.2)
BILIRUB INDIRECT FLD-MCNC: 1.7 MG/DL — HIGH (ref 0.2–1.2)
BILIRUB SERPL-MCNC: 2 MG/DL — HIGH (ref 0.2–1.2)
BILIRUB SERPL-MCNC: 2.1 MG/DL — HIGH (ref 0.2–1.2)
BUN SERPL-MCNC: 8 MG/DL — LOW (ref 10–20)
CALCIUM SERPL-MCNC: 8.5 MG/DL — SIGNIFICANT CHANGE UP (ref 8.5–10.1)
CHLORIDE SERPL-SCNC: 105 MMOL/L — SIGNIFICANT CHANGE UP (ref 98–110)
CO2 SERPL-SCNC: 24 MMOL/L — SIGNIFICANT CHANGE UP (ref 17–32)
CREAT SERPL-MCNC: 0.8 MG/DL — SIGNIFICANT CHANGE UP (ref 0.7–1.5)
FOLATE SERPL-MCNC: 14.4 NG/ML — SIGNIFICANT CHANGE UP
GLUCOSE BLDC GLUCOMTR-MCNC: 104 MG/DL — HIGH (ref 70–99)
GLUCOSE BLDC GLUCOMTR-MCNC: 77 MG/DL — SIGNIFICANT CHANGE UP (ref 70–99)
GLUCOSE BLDC GLUCOMTR-MCNC: 89 MG/DL — SIGNIFICANT CHANGE UP (ref 70–99)
GLUCOSE SERPL-MCNC: 92 MG/DL — SIGNIFICANT CHANGE UP (ref 70–99)
HAPTOGLOB SERPL-MCNC: 115 MG/DL — SIGNIFICANT CHANGE UP (ref 34–200)
HCT VFR BLD CALC: 43.1 % — SIGNIFICANT CHANGE UP (ref 42–52)
HGB BLD-MCNC: 14.9 G/DL — SIGNIFICANT CHANGE UP (ref 14–18)
LDH SERPL L TO P-CCNC: 220 U/L — SIGNIFICANT CHANGE UP (ref 50–242)
MCHC RBC-ENTMCNC: 32.5 PG — HIGH (ref 27–31)
MCHC RBC-ENTMCNC: 34.6 G/DL — SIGNIFICANT CHANGE UP (ref 32–37)
MCV RBC AUTO: 93.9 FL — SIGNIFICANT CHANGE UP (ref 80–94)
NRBC # BLD: 0 /100 WBCS — SIGNIFICANT CHANGE UP (ref 0–0)
PLATELET # BLD AUTO: 131 K/UL — SIGNIFICANT CHANGE UP (ref 130–400)
POTASSIUM SERPL-MCNC: 3.6 MMOL/L — SIGNIFICANT CHANGE UP (ref 3.5–5)
POTASSIUM SERPL-SCNC: 3.6 MMOL/L — SIGNIFICANT CHANGE UP (ref 3.5–5)
PROT SERPL-MCNC: 5.7 G/DL — LOW (ref 6–8)
RBC # BLD: 4.59 M/UL — LOW (ref 4.7–6.1)
RBC # FLD: 12.3 % — SIGNIFICANT CHANGE UP (ref 11.5–14.5)
SODIUM SERPL-SCNC: 142 MMOL/L — SIGNIFICANT CHANGE UP (ref 135–146)
TROPONIN T SERPL-MCNC: <0.01 NG/ML — SIGNIFICANT CHANGE UP
TROPONIN T SERPL-MCNC: <0.01 NG/ML — SIGNIFICANT CHANGE UP
TSH SERPL-MCNC: 2.77 UIU/ML — SIGNIFICANT CHANGE UP (ref 0.27–4.2)
URATE SERPL-MCNC: 4.5 MG/DL — SIGNIFICANT CHANGE UP (ref 3.4–8.8)
VIT B12 SERPL-MCNC: 533 PG/ML — SIGNIFICANT CHANGE UP (ref 232–1245)
WBC # BLD: 10.88 K/UL — HIGH (ref 4.8–10.8)
WBC # FLD AUTO: 10.88 K/UL — HIGH (ref 4.8–10.8)

## 2019-07-01 PROCEDURE — 76705 ECHO EXAM OF ABDOMEN: CPT | Mod: 26

## 2019-07-01 PROCEDURE — 99223 1ST HOSP IP/OBS HIGH 75: CPT | Mod: AI

## 2019-07-01 PROCEDURE — 99222 1ST HOSP IP/OBS MODERATE 55: CPT

## 2019-07-01 PROCEDURE — 70450 CT HEAD/BRAIN W/O DYE: CPT | Mod: 26

## 2019-07-01 RX ORDER — HALOPERIDOL DECANOATE 100 MG/ML
2.5 INJECTION INTRAMUSCULAR ONCE
Refills: 0 | Status: COMPLETED | OUTPATIENT
Start: 2019-07-01 | End: 2019-07-01

## 2019-07-01 RX ORDER — LISINOPRIL 2.5 MG/1
20 TABLET ORAL
Refills: 0 | Status: DISCONTINUED | OUTPATIENT
Start: 2019-07-01 | End: 2019-07-05

## 2019-07-01 RX ORDER — CHLORHEXIDINE GLUCONATE 213 G/1000ML
1 SOLUTION TOPICAL
Refills: 0 | Status: DISCONTINUED | OUTPATIENT
Start: 2019-07-01 | End: 2019-07-05

## 2019-07-01 RX ORDER — ATORVASTATIN CALCIUM 80 MG/1
40 TABLET, FILM COATED ORAL AT BEDTIME
Refills: 0 | Status: DISCONTINUED | OUTPATIENT
Start: 2019-07-01 | End: 2019-07-05

## 2019-07-01 RX ORDER — ASPIRIN/CALCIUM CARB/MAGNESIUM 324 MG
81 TABLET ORAL DAILY
Refills: 0 | Status: DISCONTINUED | OUTPATIENT
Start: 2019-07-01 | End: 2019-07-05

## 2019-07-01 RX ORDER — CARBIDOPA AND LEVODOPA 25; 100 MG/1; MG/1
1 TABLET ORAL
Refills: 0 | Status: DISCONTINUED | OUTPATIENT
Start: 2019-07-01 | End: 2019-07-01

## 2019-07-01 RX ORDER — ENTACAPONE 200 MG/1
200 TABLET, FILM COATED ORAL
Refills: 0 | Status: DISCONTINUED | OUTPATIENT
Start: 2019-07-01 | End: 2019-07-05

## 2019-07-01 RX ORDER — ALLOPURINOL 300 MG
100 TABLET ORAL DAILY
Refills: 0 | Status: DISCONTINUED | OUTPATIENT
Start: 2019-07-01 | End: 2019-07-05

## 2019-07-01 RX ORDER — BENZOCAINE AND MENTHOL 5; 1 G/100ML; G/100ML
1 LIQUID ORAL ONCE
Refills: 0 | Status: COMPLETED | OUTPATIENT
Start: 2019-07-01 | End: 2019-07-01

## 2019-07-01 RX ORDER — PANTOPRAZOLE SODIUM 20 MG/1
40 TABLET, DELAYED RELEASE ORAL
Refills: 0 | Status: DISCONTINUED | OUTPATIENT
Start: 2019-07-01 | End: 2019-07-05

## 2019-07-01 RX ORDER — ENOXAPARIN SODIUM 100 MG/ML
40 INJECTION SUBCUTANEOUS DAILY
Refills: 0 | Status: DISCONTINUED | OUTPATIENT
Start: 2019-07-01 | End: 2019-07-05

## 2019-07-01 RX ADMIN — ATORVASTATIN CALCIUM 40 MILLIGRAM(S): 80 TABLET, FILM COATED ORAL at 21:53

## 2019-07-01 RX ADMIN — CARBIDOPA AND LEVODOPA 1 TABLET(S): 25; 100 TABLET ORAL at 18:09

## 2019-07-01 RX ADMIN — CARVEDILOL PHOSPHATE 12.5 MILLIGRAM(S): 80 CAPSULE, EXTENDED RELEASE ORAL at 06:04

## 2019-07-01 RX ADMIN — Medication 1 TABLET(S): at 11:05

## 2019-07-01 RX ADMIN — ENTACAPONE 200 MILLIGRAM(S): 200 TABLET, FILM COATED ORAL at 18:09

## 2019-07-01 RX ADMIN — Medication 81 MILLIGRAM(S): at 11:03

## 2019-07-01 RX ADMIN — Medication 100 MILLIGRAM(S): at 11:05

## 2019-07-01 RX ADMIN — CARBIDOPA AND LEVODOPA 1 TABLET(S): 25; 100 TABLET ORAL at 11:05

## 2019-07-01 RX ADMIN — ENTACAPONE 200 MILLIGRAM(S): 200 TABLET, FILM COATED ORAL at 23:20

## 2019-07-01 RX ADMIN — LISINOPRIL 20 MILLIGRAM(S): 2.5 TABLET ORAL at 06:04

## 2019-07-01 RX ADMIN — CARVEDILOL PHOSPHATE 12.5 MILLIGRAM(S): 80 CAPSULE, EXTENDED RELEASE ORAL at 18:09

## 2019-07-01 RX ADMIN — CARBIDOPA AND LEVODOPA 1 TABLET(S): 25; 100 TABLET ORAL at 06:04

## 2019-07-01 RX ADMIN — ENTACAPONE 200 MILLIGRAM(S): 200 TABLET, FILM COATED ORAL at 06:04

## 2019-07-01 RX ADMIN — LISINOPRIL 20 MILLIGRAM(S): 2.5 TABLET ORAL at 18:09

## 2019-07-01 RX ADMIN — ENTACAPONE 200 MILLIGRAM(S): 200 TABLET, FILM COATED ORAL at 11:06

## 2019-07-01 RX ADMIN — ENOXAPARIN SODIUM 40 MILLIGRAM(S): 100 INJECTION SUBCUTANEOUS at 11:06

## 2019-07-01 NOTE — CONSULT NOTE ADULT - ASSESSMENT
Assessment   70 y/o M w/ past medical history of CVA (1998, and 2016 with residual expressive aphasia and cognitive deficits), Parkinson's disease,  hypertension, DM II, Gout admitted for Altered mental status.    Plan Assessment   70 y/o M w/ past medical history of CVA (1998, and 2016 with residual expressive aphasia and cognitive deficits), Parkinson's disease,  hypertension, DM II, Gout admitted for Altered mental status.    Plan  -order MRI of the head noncontrast  -check B12 level, folate, RPR, HIV,  -check lyme western blot  -Check ACE levels   -Routine EEG  -Check TB titers  -Discontinue sinemet (carbidopa/levodopa) Assessment   70 y/o M w/ past medical history of CVA (1998, and 2016 with residual expressive aphasia and cognitive deficits), ? Parkinson's disease with poor,  hypertension, DM II, Gout admitted for confusion/AMS 3rd admission for same presentation over the last year.  Pt currently has episodes of staring inconsistent on exam r/o partial complex seizure.  No signs/symptoms of parkinsons at this time therefore recommend d/c levodopa/cardidopa since can contribute to AMS.  r/o vascular etiology.    Plan  -order MRI Brain noncontrast  -check B12 level, folate, RPR, HIV,  -check lyme western blot  -Check ACE levels   -Routine EEG  -Check TB titers  -Discontinue sinemet (carbidopa/levodopa)

## 2019-07-01 NOTE — PROGRESS NOTE ADULT - SUBJECTIVE AND OBJECTIVE BOX
SUBJECTIVE:    Patient is a 69y old Male who presents with a chief complaint of Change in mental status (2019 23:25)    Currently admitted to medicine with the primary diagnosis of Encephalopathy     Today is hospital day 1d. This morning he is resting comfortably in bed and reports no new issues or overnight events. He stated that he did not eat yet, slept well though and did not walk yet. He does not complain of chest pain anymore, no fever/chills. He had trouble recalling president's name and said " I forgot". He was only oriented to place.    PAST MEDICAL & SURGICAL HISTORY  Gout  Diabetes mellitus  HTN (hypertension)  CVA (cerebral vascular accident)  Parkinson disease  No significant past surgical history    SOCIAL HISTORY:  Negative for smoking/alcohol/drug use.     ALLERGIES:  cefaclor (Unknown)  penicillin (Unknown)  sulfa drugs (Unknown)    MEDICATIONS:  STANDING MEDICATIONS  allopurinol 100 milliGRAM(s) Oral daily  aspirin enteric coated 81 milliGRAM(s) Oral daily  atorvastatin 40 milliGRAM(s) Oral at bedtime  carbidopa/levodopa  25/100 1 Tablet(s) Oral four times a day  carvedilol 12.5 milliGRAM(s) Oral every 12 hours  chlorhexidine 4% Liquid 1 Application(s) Topical <User Schedule>  enoxaparin Injectable 40 milliGRAM(s) SubCutaneous daily  entacapone 200 milliGRAM(s) Oral four times a day  lisinopril 20 milliGRAM(s) Oral two times a day  multivitamin 1 Tablet(s) Oral daily    PRN MEDICATIONS    VITALS:   T(F): 99.1  HR: 65  BP: 154/87  RR: 18  SpO2: 100%    LABS:                        14.9   10.88 )-----------( 131      ( 2019 05:30 )             43.1         142  |  105  |  8<L>  ----------------------------<  92  3.6   |  24  |  0.8    Ca    8.5      2019 05:30    TPro  x   /  Alb  x   /  TBili  2.1<H>  /  DBili  0.4<H>  /  AST  x   /  ALT  x   /  AlkPhos  x       PT/INR - ( 2019 18:30 )   PT: 11.90 sec;   INR: 1.04 ratio         PTT - ( 2019 18:30 )  PTT:26.2 sec  Urinalysis Basic - ( 2019 20:30 )    Color: Yellow / Appearance: Clear / S.010 / pH: x  Gluc: x / Ketone: Negative  / Bili: Negative / Urobili: 0.2 mg/dL   Blood: x / Protein: Negative mg/dL / Nitrite: Negative   Leuk Esterase: Negative / RBC: x / WBC x   Sq Epi: x / Non Sq Epi: x / Bacteria: x        Troponin T, Serum: <0.01 ng/mL (19 @ 05:30)  Troponin T, Serum: <0.01 ng/mL (19 @ 18:30)      CARDIAC MARKERS ( 2019 05:30 )  x     / <0.01 ng/mL / x     / x     / x      CARDIAC MARKERS ( 2019 18:30 )  x     / <0.01 ng/mL / x     / x     / x          RADIOLOGY:    EXAM:  CT BRAIN STROKE PROTOCOL          PROCEDURE DATE:  2019      INTERPRETATION:  CLINICAL HISTORY / REASON FOR EXAM: Stroke code.Altered   mental status. Aphasia.    TECHNIQUE: Multiple axial CT images of the head were obtained from the   base of the skull to the vertex without the administration of IV   contrast. Sagittal and coronal reformats were submitted.    COMPARISON: CT head without contrast from 10/10/2018.      FINDINGS:    Stable enlargement of the ventricles and cortical sulci, consistent with   parenchymal volume loss in a patient of this stated age.    There is no evidence of acute intracranial hemorrhage, mass effect or   midline shift.    Stable rounded foci of hypodensities in the left basal ganglia and right   subinsular region consistent with chronic lacunar infarcts.    Gray-white differentiation is otherwise maintained. There are patchy   subcortical white matter hypodensities consistent with moderate chronic   microvascular ischemic changes.    The bones are intact. Mastoid air cells are well aerated bilaterally. The   visualized portions of the paranasal sinuses are unremarkable.    Beam hardening artifact is noted overlying the brain stem and posterior   fossa which is inherent to CT in this location.    IMPRESSION:     No CT evidence of acute intracranial pathology. Stable examination since   10/10/2018.    Moderate chronic microvascular ischemic changes and chronic lacunar   infarcts as above.    PHYSICAL EXAM:  GEN: No acute distress, NC/AT, anicteric  LUNGS: Clear to auscultation bilaterally, no wheezes   HEART: S1/S2 present. RRR.   ABD: Soft, non-tender, non-distended. Bowel sounds present  EXT: NC/NC/NE/2+PP/[place]/Skin Intact.   NEURO: AAOX1, patient runs conversation and is cooperant but says that he does not recall date, persons, president. Non focal neurologic exam, CN intact, motor and sensory intact in all 4 extremities. No cerebellar dysfunction. Gait unassessed    Intravenous access:   NG tube:   Ewing Catheter:

## 2019-07-01 NOTE — CONSULT NOTE ADULT - SUBJECTIVE AND OBJECTIVE BOX
Neurology Consult    Patient is a 69y old  Male who presents with a chief complaint of Change in mental status (2019 08:33)      HPI:  68 y/o M w/ past medical history of CVA (1998, and 2016 with residual expressive aphasia and cognitive deficits), Parkinson's disease,  hypertension, DM II, Gout brought in for change in mental status.    As per pt's cartaker (Caroline fennel), pt was last seen well on Friday night. On saturday, she called the patient, but he was confused, and not responding appropriately to questions. She went to see him, and found him with "slurred speech", not responding to her questions, and clutching his chest. He was also complaining of l-sided chest tightness as per the caretaker. He has not taken any his medications for 2 days. He lives alone at home, with carteker living nearby and frequently visiting. He ambulates with a roll-aid. The caretaker organizes his meds for him, and he takes them independently.   Upon interviewing, pt is responding to simple questions, but only AAOx1 and not able to find his words. He endorses bilateral chest tightness but is not able to elaborate with more details. He does not know why he is in the hospital, and he denies fevers, chills, sob, lightheadedness, dizziness, abdominal pain, nausea, vomiting, change in bowel or urinary habits.    In the ED, /113, , T99, RR 18 satting 99% on RA.  CT Head was negative for acute pathology. EKG showed NSR with L anterior fasicular block.   He received Carvedilol 12.5mg and Lisinopril 20mg while in the ED. (2019 23:25)      PAST MEDICAL & SURGICAL HISTORY:  Gout  Diabetes mellitus  HTN (hypertension)  CVA (cerebral vascular accident)  Parkinson disease  No significant past surgical history      FAMILY HISTORY:  FH: CAD (coronary artery disease)  FH: hypertension      Social History: (-) x 3    Allergies    cefaclor (Unknown)  penicillin (Unknown)  sulfa drugs (Unknown)    Intolerances        MEDICATIONS  (STANDING):  allopurinol 100 milliGRAM(s) Oral daily  aspirin enteric coated 81 milliGRAM(s) Oral daily  atorvastatin 40 milliGRAM(s) Oral at bedtime  carbidopa/levodopa  25/100 1 Tablet(s) Oral four times a day  carvedilol 12.5 milliGRAM(s) Oral every 12 hours  chlorhexidine 4% Liquid 1 Application(s) Topical <User Schedule>  enoxaparin Injectable 40 milliGRAM(s) SubCutaneous daily  entacapone 200 milliGRAM(s) Oral four times a day  lisinopril 20 milliGRAM(s) Oral two times a day  multivitamin 1 Tablet(s) Oral daily  pantoprazole    Tablet 40 milliGRAM(s) Oral before breakfast    MEDICATIONS  (PRN):      Review of systems:    Constitutional: as per HPI  Eyes: No eye pain or discharge  ENMT:  No difficulty hearing; No sinus or throat pain  Neck: No pain or stiffness  Respiratory: No cough, wheezing, chills or hemoptysis  Cardiovascular: No chest pain, palpitations, shortness of breath, dyspnea on exertion  Gastrointestinal: No abdominal pain, nausea, vomiting or hematemesis; No diarrhea or constipation.   Genitourinary: No dysuria, frequency, hematuria or incontinence  Neurological: As per HPI  Skin: No rashes or lesions   Endocrine: No heat or cold intolerance; No hair loss  Musculoskeletal: No joint pain or swelling  Psychiatric: No depression, anxiety, mood swings  Heme/Lymph: No easy bruising or bleeding gums    Vital Signs Last 24 Hrs  T(C): 37 (2019 11:37), Max: 37.4 (2019 20:10)  T(F): 98.6 (2019 11:37), Max: 99.4 (2019 20:10)  HR: 60 (2019 11:37) (60 - 100)  BP: 141/75 (2019 11:37) (141/75 - 186/110)  BP(mean): --  RR: 18 (2019 11:37) (18 - 18)  SpO2: 100% (2019 06:01) (96% - 100%)    Examination:  General:  Appearance is consistent with chronologic age.  No abnormal facies.  Gross skin survey within normal limits.    Cognitive/Language:  The patient is oriented to person, place, time and date.  Recent and remote memory intact.  Fund of knowledge is intact and normal.  Language with normal repetition, comprehension and naming.  Nondysarthric.    Eyes: intact VA, VFF.  EOMI w/o nystagmus, skew or reported double vision.  PERRL.  No ptosis/weakness of eyelid closure.    Face:  Facial sensation normal V1 - 3, no facial asymmetry.    Ears/Nose/Throat:  Hearing grossly intact b/l.  Palate elevates midline.  Tongue and uvula midline.   Motor examination:   Normal tone, bulk and range of motion.  No tenderness, twitching, tremors or involuntary movements.  Formal Muscle Strength Testing: (MRC grade R/L) 5/5 UE; 5/5 LE.  No observable drift.  Reflexes:   2+ b/l pectoralis, biceps, triceps, brachioradialis, patella and Achilles.  Plantar response downgoing b/l.  Jaw jerk, Berenice, clonus absent.  Sensory examination:   Intact to light touch and pinprick, pain, temperature and proprioception and vibration in all extremities.  Cerebellum:   FTN/HKS intact with normal CHELSEA in all limbs.  No dysmetria or dysdiadokinesia.  Gait narrow based and normal.    Respiratory:  no audible wheezing or inspiratory stridor.  no use of accessory muscles.   Cardiac: pulse palpable, no audible bruits  Abdomen: supple, no guarding, no TTP    Labs:   CBC Full  -  ( 2019 05:30 )  WBC Count : 10.88 K/uL  RBC Count : 4.59 M/uL  Hemoglobin : 14.9 g/dL  Hematocrit : 43.1 %  Platelet Count - Automated : 131 K/uL  Mean Cell Volume : 93.9 fL  Mean Cell Hemoglobin : 32.5 pg  Mean Cell Hemoglobin Concentration : 34.6 g/dL  Auto Neutrophil # : x  Auto Lymphocyte # : x  Auto Monocyte # : x  Auto Eosinophil # : x  Auto Basophil # : x  Auto Neutrophil % : x  Auto Lymphocyte % : x  Auto Monocyte % : x  Auto Eosinophil % : x  Auto Basophil % : x        142  |  105  |  8<L>  ----------------------------<  92  3.6   |  24  |  0.8    Ca    8.5      2019 05:30    TPro  x   /  Alb  x   /  TBili  2.1<H>  /  DBili  0.4<H>  /  AST  x   /  ALT  x   /  AlkPhos  x       LIVER FUNCTIONS - ( 2019 05:30 )  Alb: 3.5 g/dL / Pro: 5.7 g/dL / ALK PHOS: 83 U/L / ALT: 26 U/L / AST: 20 U/L / GGT: x           PT/INR - ( 2019 18:30 )   PT: 11.90 sec;   INR: 1.04 ratio         PTT - ( 2019 18:30 )  PTT:26.2 sec  Urinalysis Basic - ( 2019 20:30 )    Color: Yellow / Appearance: Clear / S.010 / pH: x  Gluc: x / Ketone: Negative  / Bili: Negative / Urobili: 0.2 mg/dL   Blood: x / Protein: Negative mg/dL / Nitrite: Negative   Leuk Esterase: Negative / RBC: x / WBC x   Sq Epi: x / Non Sq Epi: x / Bacteria: x          Neuroimaging:  NCHCT: CT Head No Cont:   EXAM:  CT BRAIN            PROCEDURE DATE:  2019            INTERPRETATION:  Clinical History / Reason for exam: Stroke    Technique: Axial noncontrast contiguous images obtained from the vertex   to the base of the skull with sagittal and coronal reformations.    Comparison study: 2019    Findings:    The ventricles and sulci are appropriate for the patient's age. There is   probably mild atrophy.    Patchy areas of diminished attenuation within the white matter are   consistentwith moderate to severe microvascular ischemic change for a   patient of this age.    There is an ovoid sublenticular cyst in the left inferior basal ganglia   region.    There is no evidence for intracranial hemorrhage, significant space   occupyingprocess or recent territorial infarction. Gray-white   differentiation is maintained. The bones and sinuses are unremarkable.    Impression:    1.  No acute infarct    2.  Stable examination compared with     3.  Moderate to severe microvascular ischemic change                  EDDIE GARCIA M.D., ATTENDING RADIOLOGIST  This document has been electronically signed. 2019  9:51AM             (19 @ 06:31)      19 @ 13:25 Neurology Consult    Patient is a 69y old  Male who presents with a chief complaint of Change in mental status (2019 08:33)      HPI:  68 y/o M w/ past medical history of CVA (1998, and 2016 with residual expressive aphasia and cognitive deficits), Parkinson's disease,  hypertension, DM II, Gout brought in for change in mental status.    As per pt's cartaker (Caroline fennel), pt was last seen well on Friday night. On saturday, she called the patient, but he was confused, and not responding appropriately to questions. She went to see him, and found him with "slurred speech", not responding to her questions, and clutching his chest. He was also complaining of l-sided chest tightness as per the caretaker. He has not taken any his medications for 2 days. He lives alone at home, with carteker living nearby and frequently visiting. He ambulates with a roll-aid. The caretaker organizes his meds for him, and he takes them independently.   Upon interviewing, pt is responding to simple questions, but only AAOx1 and not able to find his words. He endorses bilateral chest tightness but is not able to elaborate with more details. He does not know why he is in the hospital, and he denies fevers, chills, sob, lightheadedness, dizziness, abdominal pain, nausea, vomiting, change in bowel or urinary habits.    In the ED, /113, , T99, RR 18 satting 99% on RA.  CT Head was negative for acute pathology. EKG showed NSR with L anterior fasicular block.   He received Carvedilol 12.5mg and Lisinopril 20mg while in the ED. (2019 23:25)      PAST MEDICAL & SURGICAL HISTORY:  Gout  Diabetes mellitus  HTN (hypertension)  CVA (cerebral vascular accident)  Parkinson disease  No significant past surgical history      FAMILY HISTORY:  FH: CAD (coronary artery disease)  FH: hypertension      Social History: (-) x 3    Allergies    cefaclor (Unknown)  penicillin (Unknown)  sulfa drugs (Unknown)    Intolerances        MEDICATIONS  (STANDING):  allopurinol 100 milliGRAM(s) Oral daily  aspirin enteric coated 81 milliGRAM(s) Oral daily  atorvastatin 40 milliGRAM(s) Oral at bedtime  carbidopa/levodopa  25/100 1 Tablet(s) Oral four times a day  carvedilol 12.5 milliGRAM(s) Oral every 12 hours  chlorhexidine 4% Liquid 1 Application(s) Topical <User Schedule>  enoxaparin Injectable 40 milliGRAM(s) SubCutaneous daily  entacapone 200 milliGRAM(s) Oral four times a day  lisinopril 20 milliGRAM(s) Oral two times a day  multivitamin 1 Tablet(s) Oral daily  pantoprazole    Tablet 40 milliGRAM(s) Oral before breakfast    MEDICATIONS  (PRN):      Review of systems:    Constitutional: as per HPI  Eyes: No eye pain or discharge  ENMT:  No difficulty hearing; No sinus or throat pain  Neck: No pain or stiffness  Respiratory: No cough, wheezing, chills or hemoptysis  Cardiovascular: No chest pain, palpitations, shortness of breath, dyspnea on exertion  Gastrointestinal: No abdominal pain, nausea, vomiting or hematemesis; No diarrhea or constipation.   Genitourinary: No dysuria, frequency, hematuria or incontinence  Neurological: As per HPI  Skin: No rashes or lesions   Endocrine: No heat or cold intolerance; No hair loss  Musculoskeletal: No joint pain or swelling  Psychiatric: No depression, anxiety, mood swings  Heme/Lymph: No easy bruising or bleeding gums    Vital Signs Last 24 Hrs  T(C): 37 (2019 11:37), Max: 37.4 (2019 20:10)  T(F): 98.6 (2019 11:37), Max: 99.4 (2019 20:10)  HR: 60 (2019 11:37) (60 - 100)  BP: 141/75 (2019 11:37) (141/75 - 186/110)  BP(mean): --  RR: 18 (2019 11:37) (18 - 18)  SpO2: 100% (2019 06:01) (96% - 100%)    physical Examination:  General: NAD, resting comfortably in bed  Cognitive/Language:  The patient is not oriented to person , time and date.  Oriented to place,    Eyes:. EOMI w/o nystagmus, PERRLA.  No ptosis/weakness of eyelid closure.    Cranial nerves: CN 2-12 intact     Motor examination:   Normal tone, bulk and range of motion.  No tenderness, twitching, tremors or involuntary movements.  Formal Muscle Strength Testing: (MRC grade R/L) 5/5 UE; 5/5 LE.  No observable drift.  Reflexes:   2+ b/l pectoralis, biceps, triceps, brachioradialis.  Sensory examination:   Intact to light touch and pinprick, pain, temperature and proprioception and vibration in all extremities.  Cerebellum:  deferred    Respiratory:  no audible wheezing or inspiratory stridor.  no use of accessory muscles. CTAB  Cardiac: pulse palpable, no audible bruits, s1 s2   Abdomen: soft, nontender, bs+    Labs:   CBC Full  -  ( 2019 05:30 )  WBC Count : 10.88 K/uL  RBC Count : 4.59 M/uL  Hemoglobin : 14.9 g/dL  Hematocrit : 43.1 %  Platelet Count - Automated : 131 K/uL  Mean Cell Volume : 93.9 fL  Mean Cell Hemoglobin : 32.5 pg  Mean Cell Hemoglobin Concentration : 34.6 g/dL  Auto Neutrophil # : x  Auto Lymphocyte # : x  Auto Monocyte # : x  Auto Eosinophil # : x  Auto Basophil # : x  Auto Neutrophil % : x  Auto Lymphocyte % : x  Auto Monocyte % : x  Auto Eosinophil % : x  Auto Basophil % : x        142  |  105  |  8<L>  ----------------------------<  92  3.6   |  24  |  0.8    Ca    8.5      2019 05:30    TPro  x   /  Alb  x   /  TBili  2.1<H>  /  DBili  0.4<H>  /  AST  x   /  ALT  x   /  AlkPhos  x       LIVER FUNCTIONS - ( 2019 05:30 )  Alb: 3.5 g/dL / Pro: 5.7 g/dL / ALK PHOS: 83 U/L / ALT: 26 U/L / AST: 20 U/L / GGT: x           PT/INR - ( 2019 18:30 )   PT: 11.90 sec;   INR: 1.04 ratio         PTT - ( 2019 18:30 )  PTT:26.2 sec  Urinalysis Basic - ( 2019 20:30 )    Color: Yellow / Appearance: Clear / S.010 / pH: x  Gluc: x / Ketone: Negative  / Bili: Negative / Urobili: 0.2 mg/dL   Blood: x / Protein: Negative mg/dL / Nitrite: Negative   Leuk Esterase: Negative / RBC: x / WBC x   Sq Epi: x / Non Sq Epi: x / Bacteria: x          Neuroimaging:  NCHCT: CT Head No Cont:   EXAM:  CT BRAIN            PROCEDURE DATE:  2019            INTERPRETATION:  Clinical History / Reason for exam: Stroke    Technique: Axial noncontrast contiguous images obtained from the vertex   to the base of the skull with sagittal and coronal reformations.    Comparison study: 2019    Findings:    The ventricles and sulci are appropriate for the patient's age. There is   probably mild atrophy.    Patchy areas of diminished attenuation within the white matter are   consistentwith moderate to severe microvascular ischemic change for a   patient of this age.    There is an ovoid sublenticular cyst in the left inferior basal ganglia   region.    There is no evidence for intracranial hemorrhage, significant space   occupyingprocess or recent territorial infarction. Gray-white   differentiation is maintained. The bones and sinuses are unremarkable.    Impression:    1.  No acute infarct    2.  Stable examination compared with     3.  Moderate to severe microvascular ischemic change                  EDDIE GARCIA M.D., ATTENDING RADIOLOGIST  This document has been electronically signed. 2019  9:51AM             (19 @ 06:31)      19 @ 13:25 Neurology Consult    Patient is a 69y old  Male who presents with a chief complaint of Change in mental status (2019 08:33)      HPI:  70 y/o M w/ past medical history of CVA (1998, and 2016 with residual expressive aphasia and cognitive deficits), Parkinson's disease,  hypertension, DM II, Gout brought in for change in mental status.    As per pt's cartaker (Caroline fennel), pt was last seen well on Friday night. On saturday, she called the patient, but he was confused, and not responding appropriately to questions. She went to see him, and found him with "slurred speech", not responding to her questions, and clutching his chest. He was also complaining of l-sided chest tightness as per the caretaker. He has not taken any his medications for 2 days. He lives alone at home, with carteker living nearby and frequently visiting. He ambulates with a roll-aid. The caretaker organizes his meds for him, and he takes them independently.   Upon interviewing, pt is responding to simple questions, but only AAOx1 and not able to find his words. He endorses bilateral chest tightness but is not able to elaborate with more details. He does not know why he is in the hospital, and he denies fevers, chills, sob, lightheadedness, dizziness, abdominal pain, nausea, vomiting, change in bowel or urinary habits.    In the ED, /113, , T99, RR 18 satting 99% on RA.  CT Head was negative for acute pathology. EKG showed NSR with L anterior fasicular block.   He received Carvedilol 12.5mg and Lisinopril 20mg while in the ED. (2019 23:25)      PAST MEDICAL & SURGICAL HISTORY:  Gout  Diabetes mellitus  HTN (hypertension)  CVA (cerebral vascular accident)  Parkinson disease  No significant past surgical history      FAMILY HISTORY:  FH: CAD (coronary artery disease)  FH: hypertension      Social History: (-) x 3    Allergies    cefaclor (Unknown)  penicillin (Unknown)  sulfa drugs (Unknown)    Intolerances        MEDICATIONS  (STANDING):  allopurinol 100 milliGRAM(s) Oral daily  aspirin enteric coated 81 milliGRAM(s) Oral daily  atorvastatin 40 milliGRAM(s) Oral at bedtime  carbidopa/levodopa  25/100 1 Tablet(s) Oral four times a day  carvedilol 12.5 milliGRAM(s) Oral every 12 hours  chlorhexidine 4% Liquid 1 Application(s) Topical <User Schedule>  enoxaparin Injectable 40 milliGRAM(s) SubCutaneous daily  entacapone 200 milliGRAM(s) Oral four times a day  lisinopril 20 milliGRAM(s) Oral two times a day  multivitamin 1 Tablet(s) Oral daily  pantoprazole    Tablet 40 milliGRAM(s) Oral before breakfast    MEDICATIONS  (PRN):      Review of systems:    Constitutional: as per HPI  Eyes: No eye pain or discharge  ENMT:  No difficulty hearing; No sinus or throat pain  Neck: No pain or stiffness  Respiratory: No cough, wheezing, chills or hemoptysis  Cardiovascular: No chest pain, palpitations, shortness of breath, dyspnea on exertion  Gastrointestinal: No abdominal pain, nausea, vomiting or hematemesis; No diarrhea or constipation.   Genitourinary: No dysuria, frequency, hematuria or incontinence  Neurological: As per HPI  Skin: No rashes or lesions   Endocrine: No heat or cold intolerance; No hair loss  Musculoskeletal: No joint pain or swelling  Psychiatric: No depression, anxiety, mood swings  Heme/Lymph: No easy bruising or bleeding gums    Vital Signs Last 24 Hrs  T(C): 37 (2019 11:37), Max: 37.4 (2019 20:10)  T(F): 98.6 (2019 11:37), Max: 99.4 (2019 20:10)  HR: 60 (2019 11:37) (60 - 100)  BP: 141/75 (2019 11:37) (141/75 - 186/110)  BP(mean): --  RR: 18 (2019 11:37) (18 - 18)  SpO2: 100% (2019 06:01) (96% - 100%)    physical Examination:  General: NAD, resting comfortably in bed  Cognitive/Language:  The patient is not oriented to person , time and date.  Oriented to place,    Eyes:. EOMI w/o nystagmus, PERRLA.  No ptosis/weakness of eyelid closure.    Cranial nerves: CN 2-12 intact     Motor examination:   Normal tone, bulk and range of motion.  No tenderness, twitching, tremors or involuntary movements.  Formal Muscle Strength Testing: (MRC grade R/L) 5/5 UE; 5/5 LE.  No observable drift.  Reflexes:   2+ b/l pectoralis, biceps, triceps, brachioradialis, patella, ankle, achilles   Sensory examination:   Intact to light touch and pinprick, pain, temperature and proprioception and vibration in all extremities.  Cerebellum:  deferred    Respiratory:  no audible wheezing or inspiratory stridor.  no use of accessory muscles. CTAB  Cardiac: pulse palpable, no audible bruits, s1 s2   Abdomen: soft, nontender, bs+    Labs:   CBC Full  -  ( 2019 05:30 )  WBC Count : 10.88 K/uL  RBC Count : 4.59 M/uL  Hemoglobin : 14.9 g/dL  Hematocrit : 43.1 %  Platelet Count - Automated : 131 K/uL  Mean Cell Volume : 93.9 fL  Mean Cell Hemoglobin : 32.5 pg  Mean Cell Hemoglobin Concentration : 34.6 g/dL  Auto Neutrophil # : x  Auto Lymphocyte # : x  Auto Monocyte # : x  Auto Eosinophil # : x  Auto Basophil # : x  Auto Neutrophil % : x  Auto Lymphocyte % : x  Auto Monocyte % : x  Auto Eosinophil % : x  Auto Basophil % : x        142  |  105  |  8<L>  ----------------------------<  92  3.6   |  24  |  0.8    Ca    8.5      2019 05:30    TPro  x   /  Alb  x   /  TBili  2.1<H>  /  DBili  0.4<H>  /  AST  x   /  ALT  x   /  AlkPhos  x       LIVER FUNCTIONS - ( 2019 05:30 )  Alb: 3.5 g/dL / Pro: 5.7 g/dL / ALK PHOS: 83 U/L / ALT: 26 U/L / AST: 20 U/L / GGT: x           PT/INR - ( 2019 18:30 )   PT: 11.90 sec;   INR: 1.04 ratio         PTT - ( 2019 18:30 )  PTT:26.2 sec  Urinalysis Basic - ( 2019 20:30 )    Color: Yellow / Appearance: Clear / S.010 / pH: x  Gluc: x / Ketone: Negative  / Bili: Negative / Urobili: 0.2 mg/dL   Blood: x / Protein: Negative mg/dL / Nitrite: Negative   Leuk Esterase: Negative / RBC: x / WBC x   Sq Epi: x / Non Sq Epi: x / Bacteria: x          Neuroimaging:  NCHCT: CT Head No Cont:   EXAM:  CT BRAIN            PROCEDURE DATE:  2019            INTERPRETATION:  Clinical History / Reason for exam: Stroke    Technique: Axial noncontrast contiguous images obtained from the vertex   to the base of the skull with sagittal and coronal reformations.    Comparison study: 2019    Findings:    The ventricles and sulci are appropriate for the patient's age. There is   probably mild atrophy.    Patchy areas of diminished attenuation within the white matter are   consistentwith moderate to severe microvascular ischemic change for a   patient of this age.    There is an ovoid sublenticular cyst in the left inferior basal ganglia   region.    There is no evidence for intracranial hemorrhage, significant space   occupyingprocess or recent territorial infarction. Gray-white   differentiation is maintained. The bones and sinuses are unremarkable.    Impression:    1.  No acute infarct    2.  Stable examination compared with     3.  Moderate to severe microvascular ischemic change                  EDDIE GARCIA M.D., ATTENDING RADIOLOGIST  This document has been electronically signed. 2019  9:51AM             (19 @ 06:31)      19 @ 13:25 Neurology Consult    Patient is a 69y old  Male who presents with a chief complaint of Change in mental status (2019 08:33)    HPI:  70 y/o M w/ past medical history of CVA (1998, and 2016 with residual expressive aphasia and cognitive deficits), Parkinson's disease,  hypertension, DM II, Gout brought in for change in mental status.    As per pt's cartaker (Caroline fennel), pt was last seen well on Friday night. On saturday, she called the patient, but he was confused, and not responding appropriately to questions. She went to see him, and found him with "slurred speech", not responding to her questions, and clutching his chest. He was also complaining of l-sided chest tightness as per the caretaker. He has not taken any his medications for 2 days. He lives alone at home, with carteker living nearby and frequently visiting. He ambulates with a roll-aid. The caretaker organizes his meds for him, and he takes them independently.   Upon interviewing, pt is responding to simple questions, but only AAOx1 and not able to find his words. He endorses bilateral chest tightness but is not able to elaborate with more details. He does not know why he is in the hospital, and he denies fevers, chills, sob, lightheadedness, dizziness, abdominal pain, nausea, vomiting, change in bowel or urinary habits.    In the ED, /113, , T99, RR 18 satting 99% on RA.  CT Head was negative for acute pathology. EKG showed NSR with L anterior fasicular block.   He received Carvedilol 12.5mg and Lisinopril 20mg while in the ED. (2019 23:25)    PAST MEDICAL & SURGICAL HISTORY:  out  Diabetes mellitus  HTN (hypertension)  CVA (cerebral vascular accident)  Parkinson disease  No significant past surgical history    FAMILY HISTORY:  FH: CAD (coronary artery disease)  FH: hypertension      Social History: (-) x 3    Allergies    cefaclor (Unknown)  penicillin (Unknown)  sulfa drugs (Unknown)    Intolerances    MEDICATIONS  (STANDING):  allopurinol 100 milliGRAM(s) Oral daily  aspirin enteric coated 81 milliGRAM(s) Oral daily  atorvastatin 40 milliGRAM(s) Oral at bedtime  carbidopa/levodopa  25/100 1 Tablet(s) Oral four times a day  carvedilol 12.5 milliGRAM(s) Oral every 12 hours  chlorhexidine 4% Liquid 1 Application(s) Topical <User Schedule>  enoxaparin Injectable 40 milliGRAM(s) SubCutaneous daily  entacapone 200 milliGRAM(s) Oral four times a day  lisinopril 20 milliGRAM(s) Oral two times a day  multivitamin 1 Tablet(s) Oral daily  pantoprazole    Tablet 40 milliGRAM(s) Oral before breakfast    MEDICATIONS  (PRN):      Review of systems:    Constitutional: as per HPI  Eyes: No eye pain or discharge  ENMT:  No difficulty hearing; No sinus or throat pain  Neck: No pain or stiffness  Respiratory: No cough, wheezing, chills or hemoptysis  Cardiovascular: No chest pain, palpitations, shortness of breath, dyspnea on exertion  Gastrointestinal: No abdominal pain, nausea, vomiting or hematemesis; No diarrhea or constipation.   Genitourinary: No dysuria, frequency, hematuria or incontinence  Neurological: As per HPI  Skin: No rashes or lesions   Endocrine: No heat or cold intolerance; No hair loss  Musculoskeletal: No joint pain or swelling  Psychiatric: No depression, anxiety, mood swings  Heme/Lymph: No easy bruising or bleeding gums    Vital Signs Last 24 Hrs  T(C): 37 (2019 11:37), Max: 37.4 (2019 20:10)  T(F): 98.6 (2019 11:37), Max: 99.4 (2019 20:10)  HR: 60 (2019 11:37) (60 - 100)  BP: 141/75 (2019 11:37) (141/75 - 186/110)  BP(mean): --  RR: 18 (2019 11:37) (18 - 18)  SpO2: 100% (2019 06:01) (96% - 100%)    physical Examination:  General: NAD, resting comfortably in bed, awake, alert but distractible.  denies hallucinations.   Cognitive/Language:  The patient is oriented to person and place but not time or date.  stops occasionally and stares straight ahead for few seconds but then resumes orientation, inconsistent  Eyes:. EOMI w/o nystagmus, PERRLA.  No ptosis/weakness of eyelid closure.    Cranial nerves: CN 2-12 intact     Motor examination:   Normal tone, bulk and range of motion.  No tenderness, twitching, tremors or involuntary movements.  no cogwheeling.  no rest tremor.  (+) atrophy b/l distal Le.    Formal Muscle Strength Testing: (MRC grade R/L) 5/5 UE; 5/5 LE.  No observable drift.  Reflexes:   2+ b/l pectoralis, biceps, triceps, brachioradialis, patella, ankle, achilles   Sensory examination:   Intact to light touch and pinprick, pain, temperature and proprioception and vibration in all extremities.  Cerebellum:  deferred    Respiratory:  no audible wheezing or inspiratory stridor.  no use of accessory muscles. CTAB  Cardiac: pulse palpable, no audible bruits, s1 s2   Abdomen: soft, nontender, bs+    Labs:   CBC Full  -  ( 2019 05:30 )  WBC Count : 10.88 K/uL  RBC Count : 4.59 M/uL  Hemoglobin : 14.9 g/dL  Hematocrit : 43.1 %  Platelet Count - Automated : 131 K/uL  Mean Cell Volume : 93.9 fL  Mean Cell Hemoglobin : 32.5 pg  Mean Cell Hemoglobin Concentration : 34.6 g/dL        142  |  105  |  8<L>  ----------------------------<  92  3.6   |  24  |  0.8    Ca    8.5      2019 05:30    TPro  x   /  Alb  x   /  TBili  2.1<H>  /  DBili  0.4<H>  /  AST  x   /  ALT  x   /  AlkPhos  x       LIVER FUNCTIONS - ( 2019 05:30 )  Alb: 3.5 g/dL / Pro: 5.7 g/dL / ALK PHOS: 83 U/L / ALT: 26 U/L / AST: 20 U/L / GGT: x           PT/INR - ( 2019 18:30 )   PT: 11.90 sec;   INR: 1.04 ratio      PTT - ( 2019 18:30 )  PTT:26.2 sec  Urinalysis Basic - ( 2019 20:30 )    Color: Yellow / Appearance: Clear / S.010 / pH: x  Gluc: x / Ketone: Negative  / Bili: Negative / Urobili: 0.2 mg/dL   Blood: x / Protein: Negative mg/dL / Nitrite: Negative   Leuk Esterase: Negative / RBC: x / WBC x   Sq Epi: x / Non Sq Epi: x / Bacteria: x          Neuroimaging:  NCHCT: CT Head No Cont:   EXAM:  CT BRAIN        PROCEDURE DATE:  2019      INTERPRETATION:  Clinical History / Reason for exam: Stroke    Technique: Axial noncontrast contiguous images obtained from the vertex   to the base of the skull with sagittal and coronal reformations.    Comparison study: 2019    Findings:    The ventricles and sulci are appropriate for the patient's age. There is   probably mild atrophy.    Patchy areas of diminished attenuation within the white matter are   consistentwith moderate to severe microvascular ischemic change for a   patient of this age.    There is an ovoid sublenticular cyst in the left inferior basal ganglia   region.    There is no evidence for intracranial hemorrhage, significant space   occupyingprocess or recent territorial infarction. Gray-white   differentiation is maintained. The bones and sinuses are unremarkable.  Impression:  1.  No acute infarct  2.  Stable examination compared with   3.  Moderate to severe microvascular ischemic change    EDDIE GARCIA M.D., ATTENDING RADIOLOGIST  This document has been electronically signed. 2019  9:51AM             (19 @ 06:31)      19 @ 13:25

## 2019-07-01 NOTE — PROGRESS NOTE ADULT - ASSESSMENT
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    #Acute change in mental status:  - Differential Dx includes cerebrovascular accident vs infectious etiology vs Metabolic encephalopathy  - CTH: No CT evidence of acute intracranial pathology.  - Repeat CTH in 24hrs.  - F/u TSH, B12, folate. Urine drug screen and RPR  - F/u U/A (ordered). CXR -ve for acute pathology  - Ammonia elevated on admission, repeat is wnl, repeat in am is normal  - F/up neurology consult    #Hypertensive urgency due to missed medication doses - improved:  - SBP 180s in the ED, improved after starting home meds--> currently 153/84  - C/w Carvedilol 12.5 mg q12hr and Lisinopril 20mg q12hr.    #Bilateral chest pain - likely costochondritis  - Most likely not of cardiac etiology  - EKG: NSR with L anterior fasicular block.   - Trops -ve x2. F/up 11 AM  - Tylenol for pain control as needed    #Cerebrovascular accident (2016)  - C/w ASA 81mg and Lipitor 40mg  - Follow repeat CTH tomorrow  - Pt follows up with Dr. Chavez as outpt    #Parkinson's disease  - Pt is functional, walks with RollAid at home.  - C/w Carbidopa-Levodopa  q6hr, ENtacapone 200mg q6hr. 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    #Acute change in mental status:  - Differential Dx includes cerebrovascular accident vs infectious etiology vs Metabolic encephalopathy  - CTH: No CT evidence of acute intracranial pathology.  - Repeat CTH in 24hrs.  - F/u TSH, B12, folate. Urine drug screen and RPR  - F/u U/A (ordered). CXR -ve for acute pathology  - Ammonia elevated on admission, repeat is wnl, repeat in am is normal  - F/up neurology consult    #Hypertensive urgency due to missed medication doses - improved:  - SBP 180s in the ED, improved after starting home meds--> currently 153/84  - C/w Carvedilol 12.5 mg q12hr and Lisinopril 20mg q12hr.    #Bilateral chest pain - likely costochondritis  - Most likely not of cardiac etiology  - EKG: NSR with L anterior fasicular block.   - Trops -ve x2. F/up 11 AM  - Tylenol for pain control as needed    #Cerebrovascular accident (2016)  - C/w ASA 81mg and Lipitor 40mg  - Follow repeat CTH tomorrow  - Pt follows up with Dr. Chavez as outpt    # Elevated bilirubin predominantly unconjugated in asymptomatic adult:  - F/up LDH and haptoglobin to r/o hemolysis ( very less likley)  - Probably underlying Gilbert syndrome seeing hx of increased bilirubin in the past, still <5.    #Parkinson's disease  - Pt is functional, walks with RollAid at home.  - C/w Carbidopa-Levodopa  q6hr, ENtacapone 200mg q6hr. Pharmacy does not have Rivastigmine 9mg transdermal patch--> will need to order non-formulary form    # Hx of gout:  - C/w allopurinol 100 mg qd  - Uric acid 4.5 this morning    #DM II  - FS qAC and qHS  Start insulin sliding scale if FS>180    # Diet: DASH/ CC  # DVT Prophylaxis: Lovenox SQ  # GI ppx: Protonix 40 mg qd  # Activity: ambulate as tolerated  #Dispo: Home  #Code Status: Full

## 2019-07-01 NOTE — PROGRESS NOTE ADULT - SUBJECTIVE AND OBJECTIVE BOX
ZAKIA MILLS  69y  Male      Patient is a 69y old  Male who presents with a chief complaint of Change in mental status (2019 13:24)    HPI:  68 y/o M w/ past medical history of CVA (, and 2016 with residual expressive aphasia and cognitive deficits), Parkinson's disease,  hypertension, DM II, Gout brought in for change in mental status.    As per pt's cartaker (Caroline fennel), pt was last seen well on Friday night. On saturday, she called the patient, but he was confused, and not responding appropriately to questions. She went to see him, and found him with "slurred speech", not responding to her questions, and clutching his chest. He was also complaining of l-sided chest tightness as per the caretaker. He has not taken any his medications for 2 days. He lives alone at home, with carteker living nearby and frequently visiting. He ambulates with a roll-aid. The caretaker organizes his meds for him, and he takes them independently.   Upon interviewing, pt is responding to simple questions, but only AAOx1 and not able to find his words. He endorses bilateral chest tightness but is not able to elaborate with more details. He does not know why he is in the hospital, and he denies fevers, chills, sob, lightheadedness, dizziness, abdominal pain, nausea, vomiting, change in bowel or urinary habits.    In the ED, /113, , T99, RR 18 satting 99% on RA.  CT Head was negative for acute pathology. EKG showed NSR with L anterior fasicular block.   He received Carvedilol 12.5mg and Lisinopril 20mg while in the ED. (2019 23:25)      PAST MEDICAL & SURGICAL HISTORY:  Gout  Diabetes mellitus  HTN (hypertension)  CVA (cerebral vascular accident)  Parkinson disease  No significant past surgical history    FAMILY HISTORY:  FH: CAD (coronary artery disease)  FH: hypertension    Social:    Home Medications:  acetaminophen 325 mg oral tablet: 2 tab(s) orally every 6 hours, As needed, Temp greater or equal to 38C (100.4F), Mild Pain (1 - 3) (2019 00:14)  aspirin 81 mg oral delayed release tablet: 1 tab(s) orally once a day (2019 00:14)  lisinopril 20 mg oral tablet: 1 tab(s) orally 2 times a day (2019 00:14)  Multiple Vitamins oral tablet: 1 tab(s) orally once a day (2019 00:14)  rivastigmine 9.5 mg/24 hr transdermal film, extended release: 1 patch transdermal once a day (2019 00:14)      MEDICATIONS  (STANDING):  allopurinol 100 milliGRAM(s) Oral daily  aspirin enteric coated 81 milliGRAM(s) Oral daily  atorvastatin 40 milliGRAM(s) Oral at bedtime  carbidopa/levodopa  25/100 1 Tablet(s) Oral four times a day  carvedilol 12.5 milliGRAM(s) Oral every 12 hours  chlorhexidine 4% Liquid 1 Application(s) Topical <User Schedule>  enoxaparin Injectable 40 milliGRAM(s) SubCutaneous daily  entacapone 200 milliGRAM(s) Oral four times a day  lisinopril 20 milliGRAM(s) Oral two times a day  multivitamin 1 Tablet(s) Oral daily  pantoprazole    Tablet 40 milliGRAM(s) Oral before breakfast    MEDICATIONS  (PRN):      cefaclor (Unknown)  penicillin (Unknown)  sulfa drugs (Unknown)      INTERVAL HPI/OVERNIGHT EVENTS:        REVIEW OF SYSTEMS:  CONSTITUTIONAL: No fever, weight loss, or fatigue  EYES: No eye pain, visual disturbances, or discharge  ENMT:  No difficulty hearing, tinnitus, vertigo; No sinus or throat pain  NECK: No pain or stiffness  BREASTS: No pain, masses, or nipple discharge  RESPIRATORY: No cough, wheezing, chills or hemoptysis; No shortness of breath  CARDIOVASCULAR: No chest pain, palpitations, dizziness, or leg swelling  GASTROINTESTINAL: No abdominal or epigastric pain. No nausea, vomiting, or hematemesis; No diarrhea or constipation. No melena or hematochezia.  GENITOURINARY: No dysuria, frequency, hematuria, or incontinence  NEUROLOGICAL: No headaches, memory loss, loss of strength, numbness, or tremors  SKIN: No itching, burning, rashes, or lesions   LYMPH NODES: No enlarged glands  ENDOCRINE: No heat or cold intolerance; No hair loss  MUSCULOSKELETAL: No joint pain or swelling; No muscle, back, or extremity pain  PSYCHIATRIC: No depression, anxiety, mood swings, or difficulty sleeping  HEME/LYMPH: No easy bruising, or bleeding gums  ALLERY AND IMMUNOLOGIC: No hives or eczema    T(C): 37 (19 @ 11:37), Max: 37.4 (19 @ 20:10)  HR: 60 (19 @ 11:37) (60 - 100)  BP: 141/75 (19 @ 11:37) (141/75 - 186/110)  RR: 18 (19 @ 11:37) (18 - 18)  SpO2: 100% (19 @ 06:01) (96% - 100%)  Wt(kg): --Vital Signs Last 24 Hrs  T(C): 37 (2019 11:37), Max: 37.4 (2019 20:10)  T(F): 98.6 (2019 11:37), Max: 99.4 (2019 20:10)  HR: 60 (2019 11:37) (60 - 100)  BP: 141/75 (2019 11:37) (141/75 - 186/110)  BP(mean): --  RR: 18 (2019 11:37) (18 - 18)  SpO2: 100% (2019 06:01) (96% - 100%)    PHYSICAL EXAM:  GENERAL: NAD, well-groomed, well-developed  HEAD:  Atraumatic, Normocephalic  EYES: EOMI, PERRLA, conjunctiva and sclera clear  ENMT: No tonsillar erythema, exudates, or enlargement; Moist mucous membranes, Good dentition, tongus w b/lat bruises- very likely bit it, but cant recall  NECK: Supple, No JVD, Normal thyroid  NERVOUS SYSTEM:  Alert & Oriented X3, Good concentration; Motor Strength 5/5 B/L upper and lower extremities; DTRs 2+ intact and symmetric  CHEST/LUNG: Clear to percussion bilaterally; No rales, rhonchi, wheezing, or rubs  HEART: Regular rate and rhythm; No murmurs, rubs, or gallops  ABDOMEN: Soft, Nontender, Nondistended; Bowel sounds present  EXTREMITIES:  2+ Peripheral Pulses, No clubbing, cyanosis, or edema  LYMPH: No lymphadenopathy noted  SKIN: No rashes or lesions, bruise on left forearm    Consultant(s) Notes Reviewed:  [x ] YES  [ ] NO  Care Discussed with Consultants/Other Providers [ x] YES  [ ] NO    LABS:                        14.9   10.88 )-----------( 131      ( 2019 05:30 )             43.1         142  |  105  |  8<L>  ----------------------------<  92  3.6   |  24  |  0.8    Ca    8.5      2019 05:30    TPro  x   /  Alb  x   /  TBili  2.1<H>  /  DBili  0.4<H>  /  AST  x   /  ALT  x   /  AlkPhos  x         Urinalysis Basic - ( 2019 20:30 )    Color: Yellow / Appearance: Clear / S.010 / pH: x  Gluc: x / Ketone: Negative  / Bili: Negative / Urobili: 0.2 mg/dL   Blood: x / Protein: Negative mg/dL / Nitrite: Negative   Leuk Esterase: Negative / RBC: x / WBC x   Sq Epi: x / Non Sq Epi: x / Bacteria: x      RADIOLOGY & ADDITIONAL TESTS:    Imaging Personally Reviewed:  [ ] YES  [ ] NO    HEALTH ISSUES - PROBLEM Dx:    significant deterioration in ms-   signs of tongue biting-??seizure?  dementia worsening  ct noted  mri, eeg, neuro f/u  cholelithiasis- bili elevated  neuro f/u  bp better  gout stable

## 2019-07-02 LAB
ANION GAP SERPL CALC-SCNC: 16 MMOL/L — HIGH (ref 7–14)
BASOPHILS # BLD AUTO: 0.02 K/UL — SIGNIFICANT CHANGE UP (ref 0–0.2)
BASOPHILS NFR BLD AUTO: 0.2 % — SIGNIFICANT CHANGE UP (ref 0–1)
BUN SERPL-MCNC: 8 MG/DL — LOW (ref 10–20)
CALCIUM SERPL-MCNC: 9 MG/DL — SIGNIFICANT CHANGE UP (ref 8.5–10.1)
CHLORIDE SERPL-SCNC: 102 MMOL/L — SIGNIFICANT CHANGE UP (ref 98–110)
CO2 SERPL-SCNC: 23 MMOL/L — SIGNIFICANT CHANGE UP (ref 17–32)
CREAT SERPL-MCNC: 0.9 MG/DL — SIGNIFICANT CHANGE UP (ref 0.7–1.5)
EOSINOPHIL # BLD AUTO: 0.11 K/UL — SIGNIFICANT CHANGE UP (ref 0–0.7)
EOSINOPHIL NFR BLD AUTO: 1.2 % — SIGNIFICANT CHANGE UP (ref 0–8)
GLUCOSE BLDC GLUCOMTR-MCNC: 144 MG/DL — HIGH (ref 70–99)
GLUCOSE BLDC GLUCOMTR-MCNC: 86 MG/DL — SIGNIFICANT CHANGE UP (ref 70–99)
GLUCOSE BLDC GLUCOMTR-MCNC: 93 MG/DL — SIGNIFICANT CHANGE UP (ref 70–99)
GLUCOSE SERPL-MCNC: 87 MG/DL — SIGNIFICANT CHANGE UP (ref 70–99)
HCT VFR BLD CALC: 44.6 % — SIGNIFICANT CHANGE UP (ref 42–52)
HGB BLD-MCNC: 15.6 G/DL — SIGNIFICANT CHANGE UP (ref 14–18)
HIV 1+2 AB+HIV1 P24 AG SERPL QL IA: SIGNIFICANT CHANGE UP
IMM GRANULOCYTES NFR BLD AUTO: 0.2 % — SIGNIFICANT CHANGE UP (ref 0.1–0.3)
LYMPHOCYTES # BLD AUTO: 2.7 K/UL — SIGNIFICANT CHANGE UP (ref 1.2–3.4)
LYMPHOCYTES # BLD AUTO: 29.2 % — SIGNIFICANT CHANGE UP (ref 20.5–51.1)
MAGNESIUM SERPL-MCNC: 1.7 MG/DL — LOW (ref 1.8–2.4)
MCHC RBC-ENTMCNC: 33.1 PG — HIGH (ref 27–31)
MCHC RBC-ENTMCNC: 35 G/DL — SIGNIFICANT CHANGE UP (ref 32–37)
MCV RBC AUTO: 94.5 FL — HIGH (ref 80–94)
MONOCYTES # BLD AUTO: 0.91 K/UL — HIGH (ref 0.1–0.6)
MONOCYTES NFR BLD AUTO: 9.8 % — HIGH (ref 1.7–9.3)
NEUTROPHILS # BLD AUTO: 5.49 K/UL — SIGNIFICANT CHANGE UP (ref 1.4–6.5)
NEUTROPHILS NFR BLD AUTO: 59.4 % — SIGNIFICANT CHANGE UP (ref 42.2–75.2)
NRBC # BLD: 0 /100 WBCS — SIGNIFICANT CHANGE UP (ref 0–0)
PLATELET # BLD AUTO: 136 K/UL — SIGNIFICANT CHANGE UP (ref 130–400)
POTASSIUM SERPL-MCNC: 3.6 MMOL/L — SIGNIFICANT CHANGE UP (ref 3.5–5)
POTASSIUM SERPL-SCNC: 3.6 MMOL/L — SIGNIFICANT CHANGE UP (ref 3.5–5)
RBC # BLD: 4.72 M/UL — SIGNIFICANT CHANGE UP (ref 4.7–6.1)
RBC # FLD: 12.2 % — SIGNIFICANT CHANGE UP (ref 11.5–14.5)
SODIUM SERPL-SCNC: 141 MMOL/L — SIGNIFICANT CHANGE UP (ref 135–146)
T PALLIDUM AB TITR SER: NEGATIVE — SIGNIFICANT CHANGE UP
WBC # BLD: 9.25 K/UL — SIGNIFICANT CHANGE UP (ref 4.8–10.8)
WBC # FLD AUTO: 9.25 K/UL — SIGNIFICANT CHANGE UP (ref 4.8–10.8)

## 2019-07-02 RX ORDER — MAGNESIUM SULFATE 500 MG/ML
2 VIAL (ML) INJECTION ONCE
Refills: 0 | Status: COMPLETED | OUTPATIENT
Start: 2019-07-02 | End: 2019-07-02

## 2019-07-02 RX ADMIN — PANTOPRAZOLE SODIUM 40 MILLIGRAM(S): 20 TABLET, DELAYED RELEASE ORAL at 06:01

## 2019-07-02 RX ADMIN — CHLORHEXIDINE GLUCONATE 1 APPLICATION(S): 213 SOLUTION TOPICAL at 05:59

## 2019-07-02 RX ADMIN — Medication 81 MILLIGRAM(S): at 11:03

## 2019-07-02 RX ADMIN — Medication 1 TABLET(S): at 11:03

## 2019-07-02 RX ADMIN — ENTACAPONE 200 MILLIGRAM(S): 200 TABLET, FILM COATED ORAL at 17:36

## 2019-07-02 RX ADMIN — ENTACAPONE 200 MILLIGRAM(S): 200 TABLET, FILM COATED ORAL at 11:03

## 2019-07-02 RX ADMIN — Medication 25 GRAM(S): at 10:36

## 2019-07-02 RX ADMIN — CARVEDILOL PHOSPHATE 12.5 MILLIGRAM(S): 80 CAPSULE, EXTENDED RELEASE ORAL at 06:01

## 2019-07-02 RX ADMIN — Medication 100 MILLIGRAM(S): at 11:02

## 2019-07-02 RX ADMIN — CARVEDILOL PHOSPHATE 12.5 MILLIGRAM(S): 80 CAPSULE, EXTENDED RELEASE ORAL at 17:36

## 2019-07-02 RX ADMIN — ENOXAPARIN SODIUM 40 MILLIGRAM(S): 100 INJECTION SUBCUTANEOUS at 11:03

## 2019-07-02 RX ADMIN — ENTACAPONE 200 MILLIGRAM(S): 200 TABLET, FILM COATED ORAL at 06:01

## 2019-07-02 RX ADMIN — ATORVASTATIN CALCIUM 40 MILLIGRAM(S): 80 TABLET, FILM COATED ORAL at 21:12

## 2019-07-02 RX ADMIN — LISINOPRIL 20 MILLIGRAM(S): 2.5 TABLET ORAL at 06:01

## 2019-07-02 RX ADMIN — LISINOPRIL 20 MILLIGRAM(S): 2.5 TABLET ORAL at 17:36

## 2019-07-02 NOTE — CONSULT NOTE ADULT - SUBJECTIVE AND OBJECTIVE BOX
Patient is a 69y old  Male who presents with a chief complaint of Change in mental status (2019 10:52)    HPI:  68 y/o M w/ past medical history of CVA (1998, and 2016 with residual expressive aphasia and cognitive deficits), Parkinson's disease,  hypertension, DM II, Gout brought in for change in mental status.    As per pt's cartaker (Caroline fennel), pt was last seen well on Friday night. On saturday, she called the patient, but he was confused, and not responding appropriately to questions. She went to see him, and found him with "slurred speech", not responding to her questions, and clutching his chest. He was also complaining of l-sided chest tightness as per the caretaker. He has not taken any his medications for 2 days. He lives alone at home, with carteker living nearby and frequently visiting. He ambulates with a roll-aid. The caretaker organizes his meds for him, and he takes them independently.   Upon interviewing, pt is responding to simple questions, but only AAOx1 and not able to find his words. He endorses bilateral chest tightness but is not able to elaborate with more details. He does not know why he is in the hospital, and he denies fevers, chills, sob, lightheadedness, dizziness, abdominal pain, nausea, vomiting, change in bowel or urinary habits.    In the ED, /113, , T99, RR 18 satting 99% on RA.  CT Head was negative for acute pathology. EKG showed NSR with L anterior fasicular block.   He received Carvedilol 12.5mg and Lisinopril 20mg while in the ED. (2019 23:25)      PAST MEDICAL & SURGICAL HISTORY:  Gout  Diabetes mellitus  HTN (hypertension)  CVA (cerebral vascular accident)  Parkinson disease  No significant past surgical history      Hospital Course: admitted w ms changes, hypertensive emergency  has severe tongue bruising, possibly had a grand mal seizure at home  still confused, but a little better  bp still elevated  neuro noted  eeg noted  mri pending  has 1:1 sit  TODAY'S SUBJECTIVE & REVIEW OF SYMPTOMS:     Constitutional WNL   Cardio WNL   Resp WNL   GI WNL  Heme WNL  Endo WNL  Skin WNL  MSK WNL  Neuro WNL  Cognitive WNL  Psych WNL      MEDICATIONS  (STANDING):  allopurinol 100 milliGRAM(s) Oral daily  aspirin enteric coated 81 milliGRAM(s) Oral daily  atorvastatin 40 milliGRAM(s) Oral at bedtime  carvedilol 12.5 milliGRAM(s) Oral every 12 hours  chlorhexidine 4% Liquid 1 Application(s) Topical <User Schedule>  enoxaparin Injectable 40 milliGRAM(s) SubCutaneous daily  entacapone 200 milliGRAM(s) Oral four times a day  lisinopril 20 milliGRAM(s) Oral two times a day  multivitamin 1 Tablet(s) Oral daily  pantoprazole    Tablet 40 milliGRAM(s) Oral before breakfast    MEDICATIONS  (PRN):      FAMILY HISTORY:  FH: CAD (coronary artery disease)  FH: hypertension      Allergies    cefaclor (Unknown)  penicillin (Unknown)  sulfa drugs (Unknown)    Intolerances        SOCIAL HISTORY:    [    ] Etoh  [    ] Smoking  [    ] Substance abuse     Home Environment:  [ x   ] Home Alone  [    ] Lives with Family  [    ] Home Health Aid    Dwelling:  [    ] Apartment  [ x   ] Private House  [    ] Adult Home  [    ] Skilled Nursing Facility      [    ] Short Term  [    ] Long Term  [  x  ] Stairs    4 OUTSIDE                       [    ] Elevator     FUNCTIONAL STATUS PTA: (Check all that apply)  Ambulation: [ x    ]Independent    [    ] Dependent     [    ] Non-Ambulatory  Assistive Device: [ x   ] SA Cane  [    ]  Q Cane  [  x  ] Walker  [    ]  Wheelchair  ADL : [  x  ] Independent  [    ]  Dependent       Vital Signs Last 24 Hrs  T(C): 36.4 (2019 14:24), Max: 37.1 (2019 18:54)  T(F): 97.5 (2019 14:24), Max: 98.7 (2019 18:54)  HR: 69 (2019 14:24) (69 - 91)  BP: 143/86 (2019 14:24) (143/86 - 188/87)  BP(mean): 109 (2019 14:24) (109 - 121)  RR: 18 (2019 14:24) (18 - 18)  SpO2: --      PHYSICAL EXAM: Alert & Oriented X2 ? year, knows address , knows hes in hospital  GENERAL: NAD, well-groomed, well-developed  HEAD:  Atraumatic, Normocephalic  EYES: EOMI, PERRLA, conjunctiva and sclera clear  NECK: Supple  CHEST/LUNG: Clear bilaterally  HEART: Regular rate and rhythm  ABDOMEN: Soft, Nontender, Nondistended; Bowel sounds present  EXTREMITIES:  no calf tenderness,no edema BLES    NERVOUS SYSTEM:  Cranial Nerves 2-12 intact [  x  ] Abnormal  [    ]  ROM: WFL all extremities [ x   ]  Abnormal [     ]  Motor Strength: WFL all extremities  [  x  ]  Abnormal [    ]  Sensation: intact to light touch [    x] Abnormal [    ]      FUNCTIONAL STATUS:  Bed Mobility: [   ]  Independent [x    ]  Supervision [    ]  Needs Assistance [  ]  N/A  Transfers: [    ]  Independent [    ]  Supervision [   x ]  Needs Assistance [    ]  N/A    Ambulation:  [    ]  Independent [    ]  Supervision [    ]  Needs Assistance [ x   ]  N/A   ADL:  [    ]   Independent [    ] Requires Assistance [   x ] N/A       LABS:                        15.6   9.25  )-----------( 136      ( 2019 07:22 )             44.6     07-02    141  |  102  |  8<L>  ----------------------------<  87  3.6   |  23  |  0.9    Ca    9.0      2019 07:22  Mg     1.7     07-02    TPro  x   /  Alb  x   /  TBili  2.1<H>  /  DBili  0.4<H>  /  AST  x   /  ALT  x   /  AlkPhos  x   07-01    PT/INR - ( 2019 18:30 )   PT: 11.90 sec;   INR: 1.04 ratio         PTT - ( 2019 18:30 )  PTT:26.2 sec  Urinalysis Basic - ( 2019 20:30 )    Color: Yellow / Appearance: Clear / S.010 / pH: x  Gluc: x / Ketone: Negative  / Bili: Negative / Urobili: 0.2 mg/dL   Blood: x / Protein: Negative mg/dL / Nitrite: Negative   Leuk Esterase: Negative / RBC: x / WBC x   Sq Epi: x / Non Sq Epi: x / Bacteria: x        RADIOLOGY & ADDITIONAL STUDIES:

## 2019-07-02 NOTE — PROGRESS NOTE ADULT - ASSESSMENT
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    #Acute change in mental status  - CTH: No CT evidence of acute intracranial pathology.  - MRI :1.  Extensive periventricular and subcortical white matter chronic small  vessel ischemic changes and multiple old lacunar infarcts, No acute infarcts or intracranial hemorrhage.  -  TSH, B12 513, folate.   -  U/A negative . CXR -ve for acute pathology  -Ammonia elevated on admission, repeat is wnl. f/u AM level.   -seizure precautions  -4 A rehab eval once cleared    #Hypertensive urgency due to missed medication doses - improving  - SBP 180s in the ED, improved after starting home meds  - C/w Carvedilol 12.5 mg q12hr and Lisinopril 20mg q12hr.    #Bilateral chest pain - likely costochondritis  - Cardiac etiology of pain is unlikely  - EKG: NSR with L anterior fasicular block.   - Trops -ve x1. F/u Trops 4.30 and 11 AM  - Tylenol for pain control     #Cerebrovascular accident (2016)  - C/w ASA 81mg and Lipitor 40mg  - Pt follows up with Dr. Chavez as outpt      #Parkinson's disease  - Pt is functional, walks with RollAid at home.  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    #Acute change in mental status  - CTH: No CT evidence of acute intracranial pathology.  - MRI :1.  Extensive periventricular and subcortical white matter chronic small  vessel ischemic changes and multiple old lacunar infarcts, No acute infarcts or intracranial hemorrhage.  -  TSH, B12 513, folate.   -  U/A negative . CXR -ve for acute pathology  -Ammonia elevated on admission, repeat is wnl.   -seizure precautions, patient had sever tongue bruising probably due to seizure at home      #Hypertensive urgency due to missed medication doses - improving  - SBP 180s in the ED, improved after starting home meds  - C/w Carvedilol 12.5 mg q12hr and Lisinopril 20mg q12hr.    #Bilateral chest pain trauma vs muscular - resolved   - Cardiac etiology of pain is unlikely  - EKG: NSR with L anterior fasicular block.   - Trops -ve x1. F/u Trops 4.30 and 11 AM  - Tylenol for pain control     #Cerebrovascular accident (2016)  - C/w ASA 81mg and Lipitor 40mg  - Pt follows up with Dr. Chavez as outpt  -MRI negative for new stroke    #Parkinson's disease  - Pt is functional, walks with RollAid at home.  -Carbidopa-Levodopa stopped  - ENtacapone , and Rivastigmine  -Consult lisandra dr Winchester    #DM II  - FS qAC and qHS  Start insulin sliding scale if FS>180    Diet: DASH/ CC  Prophylaxis: Lovenox SQ    #Dispo: Home when medically stable    #Code Status: Full    -patient case is discussed with Hilario (care taker )  --4 A rehab eval once cleared

## 2019-07-02 NOTE — PROGRESS NOTE ADULT - SUBJECTIVE AND OBJECTIVE BOX
Hospital Day:  2d    Subjective:    Patient is a 69y old  Male who presents with a chief complaint of Change in mental status (2019 14:51)      Past Medical Hx:   Gout  Diabetes mellitus  HTN (hypertension)  CVA (cerebral vascular accident)  Parkinson disease    Past Sx:  No significant past surgical history    Allergies:  cefaclor (Unknown)  penicillin (Unknown)  sulfa drugs (Unknown)    Current Meds:   Stand Meds:  allopurinol 100 milliGRAM(s) Oral daily  aspirin enteric coated 81 milliGRAM(s) Oral daily  atorvastatin 40 milliGRAM(s) Oral at bedtime  carvedilol 12.5 milliGRAM(s) Oral every 12 hours  chlorhexidine 4% Liquid 1 Application(s) Topical <User Schedule>  enoxaparin Injectable 40 milliGRAM(s) SubCutaneous daily  entacapone 200 milliGRAM(s) Oral four times a day  lisinopril 20 milliGRAM(s) Oral two times a day  multivitamin 1 Tablet(s) Oral daily  pantoprazole    Tablet 40 milliGRAM(s) Oral before breakfast    PRN Meds:    HOME MEDICATIONS:  acetaminophen 325 mg oral tablet: 2 tab(s) orally every 6 hours, As needed, Temp greater or equal to 38C (100.4F), Mild Pain (1 - 3)  aspirin 81 mg oral delayed release tablet: 1 tab(s) orally once a day  lisinopril 20 mg oral tablet: 1 tab(s) orally 2 times a day  Multiple Vitamins oral tablet: 1 tab(s) orally once a day  rivastigmine 9.5 mg/24 hr transdermal film, extended release: 1 patch transdermal once a day      Vital Signs:   T(F): 97.5 (19 @ 14:24), Max: 98.7 (19 @ 18:54)  HR: 69 (19 @ 14:24) (69 - 91)  BP: 143/86 (19 @ 14:24) (143/86 - 188/87)  RR: 18 (19 @ 14:24) (18 - 18)  SpO2: --      19 @ 07:01  -  19 @ 07:00  --------------------------------------------------------  IN: 0 mL / OUT: 1 mL / NET: -1 mL        Physical Exam:   GENERAL: NAD  HEENT: NCAT  CHEST/LUNG: CTAB  HEART: Regular rate and rhythm; s1 s2 appreciated, No murmurs, rubs, or gallops  ABDOMEN: Soft, Nontender, Nondistended; Bowel sounds present  EXTREMITIES: No LE edema b/l  NERVOUS SYSTEM:  Alert & Oriented X3        Labs:                         15.6   9.25  )-----------( 136      ( 2019 07:22 )             44.6     Neutophil% 59.4, Lymphocyte% 29.2, Monocyte% 9.8, Bands% 0.2 19 @ 07:22    2019 07:22    141    |  102    |  8      ----------------------------<  87     3.6     |  23     |  0.9      Ca    9.0        2019 07:22  Mg     1.7       2019 07:22    TPro  x      /  Alb  x      /  TBili  2.1    /  DBili  0.4    /  AST  x      /  ALT  x      /  AlkPhos  x      2019 07:31              Troponin <0.01, CKMB --, CK -- 19 @ 11:23  Troponin <0.01, CKMB --, CK -- 19 @ 05:30  Troponin <0.01, CKMB --, CK -- 19 @ 18:30        Urinalysis Basic - ( 2019 20:30 )    Color: Yellow / Appearance: Clear / S.010 / pH: x  Gluc: x / Ketone: Negative  / Bili: Negative / Urobili: 0.2 mg/dL   Blood: x / Protein: Negative mg/dL / Nitrite: Negative   Leuk Esterase: Negative / RBC: x / WBC x   Sq Epi: x / Non Sq Epi: x / Bacteria: x            Radiology:     DISPO: Hospital Day:  2d    Subjective:    Patient is a 69y old  Male who presents with a chief complaint of Change in mental status   patient had no acute event overnight, patient is confused, he had a poor appetite maybe due to tongue bruise.      Past Medical Hx:   Gout  Diabetes mellitus  HTN (hypertension)  CVA (cerebral vascular accident)  Parkinson disease    Past Sx:  No significant past surgical history    Allergies:  cefaclor (Unknown)  penicillin (Unknown)  sulfa drugs (Unknown)    Current Meds:   Stand Meds:  allopurinol 100 milliGRAM(s) Oral daily  aspirin enteric coated 81 milliGRAM(s) Oral daily  atorvastatin 40 milliGRAM(s) Oral at bedtime  carvedilol 12.5 milliGRAM(s) Oral every 12 hours  chlorhexidine 4% Liquid 1 Application(s) Topical <User Schedule>  enoxaparin Injectable 40 milliGRAM(s) SubCutaneous daily  entacapone 200 milliGRAM(s) Oral four times a day  lisinopril 20 milliGRAM(s) Oral two times a day  multivitamin 1 Tablet(s) Oral daily  pantoprazole    Tablet 40 milliGRAM(s) Oral before breakfast    PRN Meds:    HOME MEDICATIONS:  acetaminophen 325 mg oral tablet: 2 tab(s) orally every 6 hours, As needed, Temp greater or equal to 38C (100.4F), Mild Pain (1 - 3)  aspirin 81 mg oral delayed release tablet: 1 tab(s) orally once a day  lisinopril 20 mg oral tablet: 1 tab(s) orally 2 times a day  Multiple Vitamins oral tablet: 1 tab(s) orally once a day  rivastigmine 9.5 mg/24 hr transdermal film, extended release: 1 patch transdermal once a day      Vital Signs:   T(F): 97.5 (19 @ 14:24), Max: 98.7 (19 @ 18:54)  HR: 69 (19 @ 14:24) (69 - 91)  BP: 143/86 (19 @ 14:24) (143/86 - 188/87)  RR: 18 (19 @ 14:24) (18 - 18)  SpO2: --      19 @ 07:01  -  19 @ 07:00  --------------------------------------------------------  IN: 0 mL / OUT: 1 mL / NET: -1 mL        Physical Exam:   GENERAL: NAD, patient is sitting comfortably in bed  HEENT: NCAT, tongue bruising  CHEST/LUNG: CTAB  HEART: Regular rate and rhythm; s1 s2 appreciated, No murmurs, rubs, or gallops  ABDOMEN: Soft, Nontender, Nondistended; Bowel sounds present  EXTREMITIES: No LE edema b/l  NERVOUS SYSTEM:  Alert & Oriented X3        Labs:                         15.6   9.25  )-----------( 136      ( 2019 07:22 )             44.6     Neutophil% 59.4, Lymphocyte% 29.2, Monocyte% 9.8, Bands% 0.2 19 @ 07:22    2019 07:22    141    |  102    |  8      ----------------------------<  87     3.6     |  23     |  0.9      Ca    9.0        2019 07:22  Mg     1.7       2019 07:22    TPro  x      /  Alb  x      /  TBili  2.1    /  DBili  0.4    /  AST  x      /  ALT  x      /  AlkPhos  x      2019 07:31              Troponin <0.01, CKMB --, CK -- 19 @ 11:23  Troponin <0.01, CKMB --, CK -- 19 @ 05:30  Troponin <0.01, CKMB --, CK -- 19 @ 18:30        Urinalysis Basic - ( 2019 20:30 )    Color: Yellow / Appearance: Clear / S.010 / pH: x  Gluc: x / Ketone: Negative  / Bili: Negative / Urobili: 0.2 mg/dL   Blood: x / Protein: Negative mg/dL / Nitrite: Negative   Leuk Esterase: Negative / RBC: x / WBC x   Sq Epi: x / Non Sq Epi: x / Bacteria: x            Radiology:   Findings: The ventricles, basal cisterns and sulcal pattern are slightly   prominent consistent with parenchymal volume loss.  There are scattered   punctate foci of T2 and FLAIR hyperintensities in the bilateral frontal   and parietal periventricular and subcortical white matter which are   nonspecific and without mass effect most likely consistent with chronic   small vessel ischemic changes in a patient of this stated age.      Small foci of signal arise are noted in the bilateral basal ganglia and   thalami consistent with areas of old lacunar infarcts. Small punctate   foci of signal abnormalities are noted in the bilateral basal ganglia,   thalami and right insular ribbon on axial gradient echo images consistent   with the old blood products. There is no acute mass effect, midline shift   or hemorrhage.  No extra axial fluid collections are identified.    There are no acute infarcts on diffusion weighted images.  Expected   signal flow voids are noted in the major intracranial vessels consistent   with their patency.    Globes and orbits are grossly within normal limits.  The paranasal   sinuses and bilateral mastoid complexes are within normal limits.     There is no bone marrow signal abnormality. The sella is unremarkable.      IMPRESSION:    1.  Extensive periventricular and subcortical white matter chronic small   vessel ischemic changes and multiple old lacunar infarcts as described   above.    2.  No acute infarcts or intracranial hemorrhage.    DISPO: Hospital Day:  2d    Subjective:    Patient is a 69y old  Male who presents with a chief complaint of Change in mental status 11  day 2 after admission, patient had no acute event overnight, patient is confused, he had a poor appetite maybe due to tongue bruise.he is      Past Medical Hx:   Gout  Diabetes mellitus  HTN (hypertension)  CVA (cerebral vascular accident)  Parkinson disease    Past Sx:  No significant past surgical history    Allergies:  cefaclor (Unknown)  penicillin (Unknown)  sulfa drugs (Unknown)    Current Meds:   [facility] Meds:  allopurinol 100 milliGRAM(s) Oral daily  aspirin enteric coated 81 milliGRAM(s) Oral daily  atorvastatin 40 milliGRAM(s) Oral at bedtime  carvedilol 12.5 milliGRAM(s) Oral every 12 hours  chlorhexidine 4% Liquid 1 Application(s) Topical <User Schedule>  enoxaparin Injectable 40 milliGRAM(s) SubCutaneous daily  entacapone 200 milliGRAM(s) Oral four times a day  lisinopril 20 milliGRAM(s) Oral two times a day  multivitamin 1 Tablet(s) Oral daily  pantoprazole    Tablet 40 milliGRAM(s) Oral before breakfast    PRN Meds:    HOME MEDICATIONS:  acetaminophen 325 mg oral tablet: 2 tab(s) orally every 6 hours, As needed, Temp greater or equal to 38C (100.4F), Mild Pain (1 - 3)  aspirin 81 mg oral delayed release tablet: 1 tab(s) orally once a day  lisinopril 20 mg oral tablet: 1 tab(s) orally 2 times a day  Multiple Vitamins oral tablet: 1 tab(s) orally once a day  rivastigmine 9.5 mg/24 hr transdermal film, extended release: 1 patch transdermal once a day      Vital Signs:   T(F): 97.5 (19 @ 14:24), Max: 98.7 (19 @ 18:54)  HR: 69 (19 @ 14:24) (69 - 91)  BP: 143/86 (19 @ 14:24) (143/86 - 188/87)  RR: 18 (19 @ 14:24) (18 - 18)  SpO2: --      19 @ 07:01  -  19 @ 07:00  --------------------------------------------------------  IN: 0 mL / OUT: 1 mL / NET: -1 mL        Physical Exam:   GENERAL: NAD, patient is sitting comfortably in bed  HEENT: NCAT, tongue bruising  CHEST/LUNG: CTAB  HEART: Regular rate and rhythm; s1 s2 appreciated, No murmurs, rubs, or gallops  ABDOMEN: Soft, Nontender, Nondistended; Bowel sounds present  EXTREMITIES: No LE edema b/l  NERVOUS SYSTEM:  Alert & Oriented X1, decreased sensation in lower extremity        Labs:                         15.6   9.25  )-----------( 136      ( 2019 07:22 )             44.6     Neutophil% 59.4, Lymphocyte% 29.2, Monocyte% 9.8, Bands% 0.2 19 @ 07:22    2019 07:22    141    |  102    |  8      ----------------------------<  87     3.6     |  23     |  0.9      Ca    9.0        2019 07:22  Mg     1.7       2019 07:22    TPro  x      /  Alb  x      /  TBili  2.1    /  DBili  0.4    /  AST  x      /  ALT  x      /  AlkPhos  x      2019 07:31              Troponin <0.01, CKMB --, CK -- 19 @ 11:23  Troponin <0.01, CKMB --, CK -- 19 @ 05:30  Troponin <0.01, CKMB --, CK -- 19 @ 18:30        Urinalysis Basic - ( 2019 20:30 )    Color: Yellow / Appearance: Clear / S.010 / pH: x  Gluc: x / Ketone: Negative  / Bili: Negative / Urobili: 0.2 mg/dL   Blood: x / Protein: Negative mg/dL / Nitrite: Negative   Leuk Esterase: Negative / RBC: x / WBC x   Sq Epi: x / Non Sq Epi: x / Bacteria: x            Radiology:   Findings: The ventricles, basal cisterns and sulcal pattern are slightly   prominent consistent with parenchymal volume loss.  There are scattered   punctate foci of T2 and FLAIR hyperintensities in the bilateral frontal   and parietal periventricular and subcortical white matter which are   nonspecific and without mass effect most likely consistent with chronic   small vessel ischemic changes in a patient of this stated age.      Small foci of signal arise are noted in the bilateral basal ganglia and   thalami consistent with areas of old lacunar infarcts. Small punctate   foci of signal abnormalities are noted in the bilateral basal ganglia,   thalami and right insular ribbon on axial gradient echo images consistent   with the old blood products. There is no acute mass effect, midline shift   or hemorrhage.  No extra axial fluid collections are identified.    There are no acute infarcts on diffusion weighted images.  Expected   signal flow voids are noted in the major intracranial vessels consistent   with their patency.    Globes and orbits are grossly within normal limits.  The paranasal   sinuses and bilateral mastoid complexes are within normal limits.     There is no bone marrow signal abnormality. The sella is unremarkable.      IMPRESSION:    1.  Extensive periventricular and subcortical white matter chronic small   vessel ischemic changes and multiple old lacunar infarcts as described   above.    2.  No acute infarcts or intracranial hemorrhage.    DISPO:

## 2019-07-02 NOTE — PROGRESS NOTE ADULT - SUBJECTIVE AND OBJECTIVE BOX
ZAKIA MILLS  69y  Male    admitted w ms changes, hypertensive emergency  has severe tongue bruising, possibly had a grand mal seizure at home  still confused, but a little better  bp still elevated  neuro noted  eeg noted  mri pending  INTERVAL EVENTS:    T(C): 36.9 (19 @ 06:00), Max: 37.1 (19 @ 18:54)  HR: 77 (19 @ 06:00) (60 - 91)  BP: 161/95 (19 @ 06:00) (141/75 - 188/87)  RR: 18 (19 @ 06:00) (18 - 18)  SpO2: --  Wt(kg): --Vital Signs Last 24 Hrs  T(C): 36.9 (2019 06:00), Max: 37.1 (2019 18:54)  T(F): 98.4 (2019 06:00), Max: 98.7 (2019 18:54)  HR: 77 (2019 06:00) (60 - 91)  BP: 161/95 (2019 06:00) (141/75 - 188/87)  BP(mean): 121 (2019 06:00) (121 - 121)  RR: 18 (2019 06:00) (18 - 18)  SpO2: --    PHYSICAL EXAM:  GENERAL: resting comfortably  NECK: Supple, No JVD, Normal thyroid  CHEST/LUNG: Clear; No crackles or wheezing  HEART: S1, S2, Regular rate and rhythm;   ABDOMEN: Soft, Nontender, Nondistended; Bowel sounds present  EXTREMITIES: No clubbing, cyanosis, or edema  SKIN: No rashes or lesions. tongue lesions- need treatment    LABS:                          15.6   9.25  )-----------( 136      ( 2019 07:22 )             44.6     07-02    141  |  102  |  8<L>  ----------------------------<  87  3.6   |  23  |  0.9    Ca    9.0      2019 07:22  Mg     1.7     07-02    TPro  x   /  Alb  x   /  TBili  2.1<H>  /  DBili  0.4<H>  /  AST  x   /  ALT  x   /  AlkPhos  x       PT/INR - ( 2019 18:30 )   PT: 11.90 sec;   INR: 1.04 ratio         PTT - ( 2019 18:30 )  PTT:26.2 sec  Urinalysis Basic - ( 2019 20:30 )    Color: Yellow / Appearance: Clear / S.010 / pH: x  Gluc: x / Ketone: Negative  / Bili: Negative / Urobili: 0.2 mg/dL   Blood: x / Protein: Negative mg/dL / Nitrite: Negative   Leuk Esterase: Negative / RBC: x / WBC x   Sq Epi: x / Non Sq Epi: x / Bacteria: x            allopurinol 100 milliGRAM(s) Oral daily  aspirin enteric coated 81 milliGRAM(s) Oral daily  atorvastatin 40 milliGRAM(s) Oral at bedtime  carvedilol 12.5 milliGRAM(s) Oral every 12 hours  chlorhexidine 4% Liquid 1 Application(s) Topical <User Schedule>  enoxaparin Injectable 40 milliGRAM(s) SubCutaneous daily  entacapone 200 milliGRAM(s) Oral four times a day  lisinopril 20 milliGRAM(s) Oral two times a day  multivitamin 1 Tablet(s) Oral daily  pantoprazole    Tablet 40 milliGRAM(s) Oral before breakfast      RADIOLOGY & ADDITIONAL TESTS:    case discussed with the resident  Available consult notes reviewed    Assessment and plan:     awaiting mri  poor appetite, probably tongue is hurting too- give kenalog oral gel, encourage po intake  sinemet d/c-ed, but still on entacarpone- neuro f/u  seizure precautions  4 A rehab eval once cleared  r/o vascular causes of dementia  gout- stable

## 2019-07-02 NOTE — CONSULT NOTE ADULT - ASSESSMENT
IMPRESSION: Rehab of gait ab encephalopathy, hx CVA    PRECAUTIONS: [ x   ] Cardiac  [    ] Respiratory  [   x ] Seizures [    ] Contact Isolation  [    ] Droplet Isolation  [    ] Other    Weight Bearing Status:     RECOMMENDATION:    Out of Bed to Chair     DVT/Decubiti Prophylaxis    REHAB PLAN:     [   x  ] Bedside P/T 3-5 times a week   [     ] Bedside O/T  2-3 times a week   [     ] No Rehab Therapy Indicated   [     ]  Speech Therapy   Conditioning/ROM                                 ADL  Bed Mobility                                            Conditioning/ROM  Transfers                                                  Bed Mobility  Sitting /Standing Balance                      Transfers                                        Gait Training                                            Sitting/Standing Balance  Stair Training [   ]Applicable                 Home equipment Eval                                                                     Splinting  [   ] Only      GOALS:   ADL   [ x   ]   Independent         Transfers  [x] Independent            Ambulation  [  x   ] Independent     [ x    ] With device                            [    ]  CG                                               [    ]  CG                                                    [     ] CG                            [    ] Min A                                          [    ] Min A                                                [     ] Min  A          DISCHARGE PLAN:   [     ]  Good candidate for Intensive Rehabilitation/Hospital based                                             Will tolerate 3hrs Intensive Rehab Daily                                       [      ]  Short Term Rehab in Skilled Nursing Facility                                       [  x    ]  Home with Outpatient or VN services                               vs                                     [   x   ]  Possible Candidate for Intensive Hospital based Rehab

## 2019-07-03 LAB
ACE SERPL-CCNC: 4 U/L — LOW (ref 14–82)
GLUCOSE BLDC GLUCOMTR-MCNC: 115 MG/DL — HIGH (ref 70–99)
GLUCOSE BLDC GLUCOMTR-MCNC: 163 MG/DL — HIGH (ref 70–99)
GLUCOSE BLDC GLUCOMTR-MCNC: 177 MG/DL — HIGH (ref 70–99)
GLUCOSE BLDC GLUCOMTR-MCNC: 209 MG/DL — HIGH (ref 70–99)

## 2019-07-03 PROCEDURE — 70551 MRI BRAIN STEM W/O DYE: CPT | Mod: 26

## 2019-07-03 RX ORDER — CARBIDOPA AND LEVODOPA 25; 100 MG/1; MG/1
1 TABLET ORAL
Refills: 0 | Status: DISCONTINUED | OUTPATIENT
Start: 2019-07-03 | End: 2019-07-05

## 2019-07-03 RX ORDER — LEVETIRACETAM 250 MG/1
250 TABLET, FILM COATED ORAL
Refills: 0 | Status: DISCONTINUED | OUTPATIENT
Start: 2019-07-03 | End: 2019-07-05

## 2019-07-03 RX ADMIN — CARBIDOPA AND LEVODOPA 1 TABLET(S): 25; 100 TABLET ORAL at 15:49

## 2019-07-03 RX ADMIN — Medication 81 MILLIGRAM(S): at 11:26

## 2019-07-03 RX ADMIN — ENTACAPONE 200 MILLIGRAM(S): 200 TABLET, FILM COATED ORAL at 11:26

## 2019-07-03 RX ADMIN — LEVETIRACETAM 250 MILLIGRAM(S): 250 TABLET, FILM COATED ORAL at 17:22

## 2019-07-03 RX ADMIN — ENTACAPONE 200 MILLIGRAM(S): 200 TABLET, FILM COATED ORAL at 05:23

## 2019-07-03 RX ADMIN — Medication 1 TABLET(S): at 11:26

## 2019-07-03 RX ADMIN — CARVEDILOL PHOSPHATE 12.5 MILLIGRAM(S): 80 CAPSULE, EXTENDED RELEASE ORAL at 17:21

## 2019-07-03 RX ADMIN — ATORVASTATIN CALCIUM 40 MILLIGRAM(S): 80 TABLET, FILM COATED ORAL at 21:49

## 2019-07-03 RX ADMIN — PANTOPRAZOLE SODIUM 40 MILLIGRAM(S): 20 TABLET, DELAYED RELEASE ORAL at 06:15

## 2019-07-03 RX ADMIN — ENTACAPONE 200 MILLIGRAM(S): 200 TABLET, FILM COATED ORAL at 17:22

## 2019-07-03 RX ADMIN — LISINOPRIL 20 MILLIGRAM(S): 2.5 TABLET ORAL at 05:23

## 2019-07-03 RX ADMIN — CHLORHEXIDINE GLUCONATE 1 APPLICATION(S): 213 SOLUTION TOPICAL at 05:31

## 2019-07-03 RX ADMIN — LISINOPRIL 20 MILLIGRAM(S): 2.5 TABLET ORAL at 17:22

## 2019-07-03 RX ADMIN — ENTACAPONE 200 MILLIGRAM(S): 200 TABLET, FILM COATED ORAL at 00:06

## 2019-07-03 RX ADMIN — CARBIDOPA AND LEVODOPA 1 TABLET(S): 25; 100 TABLET ORAL at 21:49

## 2019-07-03 RX ADMIN — CARBIDOPA AND LEVODOPA 1 TABLET(S): 25; 100 TABLET ORAL at 11:27

## 2019-07-03 RX ADMIN — CARVEDILOL PHOSPHATE 12.5 MILLIGRAM(S): 80 CAPSULE, EXTENDED RELEASE ORAL at 05:23

## 2019-07-03 RX ADMIN — Medication 100 MILLIGRAM(S): at 11:26

## 2019-07-03 NOTE — PHYSICAL THERAPY INITIAL EVALUATION ADULT - CRITERIA FOR SKILLED THERAPEUTIC INTERVENTIONS
functional limitations in following categories/rehab potential/predicted duration of therapy intervention/therapy frequency/anticipated discharge recommendation/impairments found

## 2019-07-03 NOTE — PROGRESS NOTE ADULT - ASSESSMENT
Assessment   68 y/o M w/ past medical history of CVA (1998, and 2016 with residual expressive aphasia and cognitive deficits), ? Parkinson's disease with poor,  hypertension, DM II, Gout admitted for confusion/[facility] 3rd admission for same presentation over the last year. Patient is much more interactive, not confused, alert and oriented.    Plan in progress  -order MRI Brain noncontrast = pending  -check B12 level, folate, RPR, HIV, = pending  -check lyme western blot  -Check ACE levels   -Routine EEG - showed abnormal due to presence of generalized slowing as above  -Check TB titers  -Discontinue sinemet (carbidopa/levodopa)

## 2019-07-03 NOTE — PHYSICAL THERAPY INITIAL EVALUATION ADULT - GENERAL OBSERVATIONS, REHAB EVAL
10:30-11:10 pt encountered in bed, alert and cooperative.  Pt states he has no memory of how he got to the hospital.

## 2019-07-03 NOTE — PROGRESS NOTE ADULT - SUBJECTIVE AND OBJECTIVE BOX
ZAKIA MILLS  69y  Male  admitted w changes in ms, most probably had a seizure  mri pending  significantly better today, more alert, oriented, verbal, concerned that doesntremember lastfew days  neuro noted  agree w management plan    INTERVAL EVENTS:    T(C): 36.2 (07-03-19 @ 05:46), Max: 36.4 (07-02-19 @ 14:24)  HR: 82 (07-02-19 @ 22:07) (69 - 90)  BP: 131/63 (07-03-19 @ 05:46) (131/63 - 146/90)  RR: 20 (07-03-19 @ 05:46) (18 - 20)  SpO2: --  Wt(kg): --Vital Signs Last 24 Hrs  T(C): 36.2 (03 Jul 2019 05:46), Max: 36.4 (02 Jul 2019 14:24)  T(F): 97.2 (03 Jul 2019 05:46), Max: 97.5 (02 Jul 2019 14:24)  HR: 82 (02 Jul 2019 22:07) (69 - 90)  BP: 131/63 (03 Jul 2019 05:46) (131/63 - 146/90)  BP(mean): 109 (02 Jul 2019 14:24) (109 - 109)  RR: 20 (03 Jul 2019 05:46) (18 - 20)  SpO2: --    PHYSICAL EXAM:  GENERAL: resting comfortably  NECK: Supple, No JVD, Normal thyroid  CHEST/LUNG: Clear; No crackles or wheezing  HEART: S1, S2, Regular rate and rhythm;   ABDOMEN: Soft, Nontender, Nondistended; Bowel sounds present  EXTREMITIES: No clubbing, cyanosis, or edema  SKIN: No rashes or lesions    LABS:                          15.6   9.25  )-----------( 136      ( 02 Jul 2019 07:22 )             44.6     07-02    141  |  102  |  8<L>  ----------------------------<  87  3.6   |  23  |  0.9    Ca    9.0      02 Jul 2019 07:22  Mg     1.7     07-02                allopurinol 100 milliGRAM(s) Oral daily  aspirin enteric coated 81 milliGRAM(s) Oral daily  atorvastatin 40 milliGRAM(s) Oral at bedtime  carbidopa/levodopa  25/100 1 Tablet(s) Oral <User Schedule>  carvedilol 12.5 milliGRAM(s) Oral every 12 hours  chlorhexidine 4% Liquid 1 Application(s) Topical <User Schedule>  enoxaparin Injectable 40 milliGRAM(s) SubCutaneous daily  entacapone 200 milliGRAM(s) Oral four times a day  lisinopril 20 milliGRAM(s) Oral two times a day  multivitamin 1 Tablet(s) Oral daily  pantoprazole    Tablet 40 milliGRAM(s) Oral before breakfast      RADIOLOGY & ADDITIONAL TESTS:    case discussed with the resident  Available consult notes reviewed    Assessment and plan:       proceed w mri  neuro-agree w cont sinemet, start keppra  once mri completed, seems medically stable to go to rehab with f/u w neuro  no cardiac events, no arrhythmia  bp well controlled

## 2019-07-03 NOTE — PHYSICAL THERAPY INITIAL EVALUATION ADULT - PERTINENT HX OF CURRENT PROBLEM, REHAB EVAL
pt was admitted due to AMS, d/x of encephalopathy, secondary dx of left chest pain. Possible seizure at home.

## 2019-07-03 NOTE — PROGRESS NOTE ADULT - SUBJECTIVE AND OBJECTIVE BOX
Neurology Progress Note    Interval History:  patient is examined at bedside, resting comfortably, No acute events overnight, patient is unable to remember the reason for his admission to hospital.    HPI:  70 y/o M w/ past medical history of CVA (1998, and 2016 with residual expressive aphasia and cognitive deficits), Parkinson's disease,  hypertension, DM II, Gout brought in for change in mental status.    As per pt's cartaker (Caroline fennel), pt was last seen well on Friday night. On saturday, she called the patient, but he was confused, and not responding appropriately to questions. She went to see him, and found him with "slurred speech", not responding to her questions, and clutching his chest. He was also complaining of l-sided chest tightness as per the caretaker. He has not taken any his medications for 2 days. He lives alone at home, with carteker living nearby and frequently visiting. He ambulates with a roll-aid. The caretaker organizes his meds for him, and he takes them independently.   Upon interviewing, pt is responding to simple questions, but only AAOx1 and not able to find his words. He endorses bilateral chest tightness but is not able to elaborate with more details. He does not know why he is in the hospital, and he denies fevers, chills, sob, lightheadedness, dizziness, abdominal pain, nausea, vomiting, change in bowel or urinary habits.    In the ED, /113, , T99, RR 18 satting 99% on RA.  CT Head was negative for acute pathology. EKG showed NSR with L anterior fasicular block.   He received Carvedilol 12.5mg and Lisinopril 20mg while in the ED. (30 Jun 2019 23:25)      PAST MEDICAL & SURGICAL HISTORY:  Gout  Diabetes mellitus  HTN (hypertension)  CVA (cerebral vascular accident)  Parkinson disease  No significant past surgical history          Medications:  allopurinol 100 milliGRAM(s) Oral daily  aspirin enteric coated 81 milliGRAM(s) Oral daily  atorvastatin 40 milliGRAM(s) Oral at bedtime  carbidopa/levodopa  25/100 1 Tablet(s) Oral <User Schedule>  carvedilol 12.5 milliGRAM(s) Oral every 12 hours  chlorhexidine 4% Liquid 1 Application(s) Topical <User Schedule>  enoxaparin Injectable 40 milliGRAM(s) SubCutaneous daily  entacapone 200 milliGRAM(s) Oral four times a day  levETIRAcetam 250 milliGRAM(s) Oral two times a day  lisinopril 20 milliGRAM(s) Oral two times a day  multivitamin 1 Tablet(s) Oral daily  pantoprazole    Tablet 40 milliGRAM(s) Oral before breakfast      Vital Signs Last 24 Hrs  T(C): 36.2 (03 Jul 2019 05:46), Max: 36.4 (02 Jul 2019 14:24)  T(F): 97.2 (03 Jul 2019 05:46), Max: 97.5 (02 Jul 2019 14:24)  HR: 82 (02 Jul 2019 22:07) (69 - 90)  BP: 131/63 (03 Jul 2019 05:46) (131/63 - 146/90)  BP(mean): 109 (02 Jul 2019 14:24) (109 - 109)  RR: 20 (03 Jul 2019 05:46) (18 - 20)  SpO2: --    Neurological Exam:   Mental status: Awake, alert and oriented x3.  Attention/concentration intact.  No dysarthria, no aphasia.  Fund of knowledge appropriate.    Cranial nerves: Pupils equally round and reactive to light, visual fields full, no nystagmus, extraocular muscles intact, V1 through V3 intact bilaterally and symmetric, face symmetric, hearing intact to finger rub, palate elevation symmetric, tongue was midline.  Motor:  MRC grading 5/5 b/l UE/LE.   strength 5/5.  Normal tone and bulk.  No abnormal movements.    Sensation: Intact to light touch, proprioception, and pinprick.   Reflexes: 2+ in bilateral UE/LE, downgoing toes bilaterally. (-) Neal.    Labs:  CBC Full  -  ( 02 Jul 2019 07:22 )  WBC Count : 9.25 K/uL  RBC Count : 4.72 M/uL  Hemoglobin : 15.6 g/dL  Hematocrit : 44.6 %  Platelet Count - Automated : 136 K/uL  Mean Cell Volume : 94.5 fL  Mean Cell Hemoglobin : 33.1 pg  Mean Cell Hemoglobin Concentration : 35.0 g/dL  Auto Neutrophil # : 5.49 K/uL  Auto Lymphocyte # : 2.70 K/uL  Auto Monocyte # : 0.91 K/uL  Auto Eosinophil # : 0.11 K/uL  Auto Basophil # : 0.02 K/uL  Auto Neutrophil % : 59.4 %  Auto Lymphocyte % : 29.2 %  Auto Monocyte % : 9.8 %  Auto Eosinophil % : 1.2 %  Auto Basophil % : 0.2 %    07-02    141  |  102  |  8<L>  ----------------------------<  87  3.6   |  23  |  0.9    Ca    9.0      02 Jul 2019 07:22  Mg     1.7     07-02

## 2019-07-03 NOTE — PROGRESS NOTE ADULT - ASSESSMENT
70 y/o M w/ past medical history of CVA (1998, and 2016 with residual expressive aphasia and cognitive deficits), Parkinson's disease,  hypertension, DM II, Gout brought in for acute change in mental status.    #Acute change in mental status ( now resolved), ikely seizure  - CTH: No CT evidence of acute intracranial pathology.  - pending MRI today  Folate, : 14.4 ng/mL  -  U/A negative . CXR -ve for acute pathology  -Ammonia elevated on admission, repeat is wnl.   -seizure precautions, patient had sever tongue bruising probably due to seizure at home      #Hypertensive urgency due to missed medication doses - improving  - SBP 180s in the ED, improved after starting home meds  - C/w Carvedilol 12.5 mg q12hr and Lisinopril 20mg q12hr.    #Bilateral chest pain trauma vs muscular - resolved   - Cardiac etiology of pain is unlikely  - EKG: NSR with L anterior fasicular block.   - Trops -ve x1. F/u Trops 4.30 and 11 AM  - Tylenol for pain control     #Cerebrovascular accident (2016)  - C/w ASA 81mg and Lipitor 40mg  - Pt follows up with Dr. Chavez as outpt  -MRI negative for new stroke    #Parkinson's disease  - Pt is functional, walks with RollAid at home.  -Carbidopa-Levodopa stopped on admission but now resumed  - ENtacapone , and Rivastigmine  -patient started on keppra 200 BID for 7 days, then it will be increased to 500 BID     #DM II  - FS qAC and qHS  Start insulin sliding scale if FS>180    Diet: DASH/ CC  Prophylaxis: Lovenox SQ    #Dispo: Home when medically stable    #Code Status: Full    -patient case is discussed with Hilario (care taker )  --4 A rehab eval once we get MRI result 70 y/o M w/ past medical history of CVA (1998, and 2016 with residual expressive aphasia and cognitive deficits), Parkinson's disease,  hypertension, DM II, Gout brought in for acute change in mental status.    #Acute change in mental status ( now resolved), ikely seizure  - CTH: No CT evidence of acute intracranial pathology.  - pending MRI today, Last MRI was june, 2018  Folate, : 14.4 ng/mL  -  U/A negative . CXR -ve for acute pathology  -Ammonia elevated on admission, repeat is wnl.   -seizure precautions, patient had sever tongue bruising probably due to seizure at home      #Hypertensive urgency due to missed medication doses - improving  - SBP 180s in the ED, improved after starting home meds  - C/w Carvedilol 12.5 mg q12hr and Lisinopril 20mg q12hr.    #Bilateral chest pain trauma vs muscular - resolved   - Cardiac etiology of pain is unlikely  - EKG: NSR with L anterior fasicular block.   - Trops -ve x1. F/u Trops 4.30 and 11 AM  - Tylenol for pain control     #Cerebrovascular accident (2016)  - C/w ASA 81mg and Lipitor 40mg  - Pt follows up with Dr. Chavez as outpt  -MRI negative for new stroke    #Parkinson's disease  - Pt is functional, walks with RollAid at home.  -Carbidopa-Levodopa stopped on admission but now resumed  - ENtacapone , and Rivastigmine  -patient started on keppra 200 BID for 7 days, then it will be increased to 500 BID     #DM II  - FS qAC and qHS  Start insulin sliding scale if FS>180    Diet: DASH/ CC  Prophylaxis: Lovenox SQ    #Dispo: Home when medically stable    #Code Status: Full    -patient case is discussed with Hilario (care taker )  --4 A rehab eval once we get MRI result

## 2019-07-03 NOTE — PROGRESS NOTE ADULT - SUBJECTIVE AND OBJECTIVE BOX
Hospital Day:  3d    Subjective:    Patient is a 69y old  Male who presents with a chief complaint of Change in mental status (2019 08:57)  patient today is feeling better, he is aware that he had no memory of the last 2 days, he is AAO*3 ( as per Dr. Mclean the patient is back his baseline),no acute events overnight, patient having better appetite today, he  reported less tongue pain    Past Medical Hx:   Gout  Diabetes mellitus  HTN (hypertension)  CVA (cerebral vascular accident)  Parkinson disease    Past Sx:  No significant past surgical history    Allergies:  cefaclor (Unknown)  penicillin (Unknown)  sulfa drugs (Unknown)    Current Meds:   [facility] Meds:  allopurinol 100 milliGRAM(s) Oral daily  aspirin enteric coated 81 milliGRAM(s) Oral daily  atorvastatin 40 milliGRAM(s) Oral at bedtime  carbidopa/levodopa  25/100 1 Tablet(s) Oral <User Schedule>  carvedilol 12.5 milliGRAM(s) Oral every 12 hours  chlorhexidine 4% Liquid 1 Application(s) Topical <User Schedule>  enoxaparin Injectable 40 milliGRAM(s) SubCutaneous daily  entacapone 200 milliGRAM(s) Oral four times a day  levETIRAcetam 250 milliGRAM(s) Oral two times a day  lisinopril 20 milliGRAM(s) Oral two times a day  multivitamin 1 Tablet(s) Oral daily  pantoprazole    Tablet 40 milliGRAM(s) Oral before breakfast    PRN Meds:    HOME MEDICATIONS:  acetaminophen 325 mg oral tablet: 2 tab(s) orally every 6 hours, As needed, Temp greater or equal to 38C (100.4F), Mild Pain (1 - 3)  aspirin 81 mg oral delayed release tablet: 1 tab(s) orally once a day  lisinopril 20 mg oral tablet: 1 tab(s) orally 2 times a day  Multiple Vitamins oral tablet: 1 tab(s) orally once a day  rivastigmine 9.5 mg/24 hr transdermal film, extended release: 1 patch transdermal once a day      Vital Signs:   T(F): 97.2 (19 @ 05:46), Max: 97.5 (19 @ 14:24)  HR: 82 (19 @ 22:07) (69 - 90)  BP: 131/63 (19 @ 05:46) (131/63 - 146/90)  RR: 20 (19 @ 05:46) (18 - 20)  SpO2: --      19 @ 07:01  -  19 @ 07:00  --------------------------------------------------------  IN: 0 mL / OUT: 400 mL / NET: -400 mL        Physical Exam:   GENERAL: NAD  HEENT: NCAT  CHEST/LUNG: CTAB  HEART: Regular rate and rhythm; s1 s2 appreciated, No murmurs, rubs, or gallops  ABDOMEN: Soft, Nontender, Nondistended; Bowel sounds present  EXTREMITIES: No LE edema b/l  NERVOUS SYSTEM:  Alert & Oriented X3, rigdity on upper extremity, motor examination is non focal      Labs:                         15.6   9.25  )-----------( 136      ( 2019 07:22 )             44.6       2019 07:22    141    |  102    |  8      ----------------------------<  87     3.6     |  23     |  0.9      Ca    9.0        2019 07:22  Mg     1.7       2019 07:22                Troponin <0.01, CKMB --, CK -- 19 @ 11:23  Troponin <0.01, CKMB --, CK -- 19 @ 05:30  Troponin <0.01, CKMB --, CK -- 19 @ 18:30        Urinalysis Basic - ( 2019 20:30 )    Color: Yellow / Appearance: Clear / S.010 / pH: x  Gluc: x / Ketone: Negative  / Bili: Negative / Urobili: 0.2 mg/dL   Blood: x / Protein: Negative mg/dL / Nitrite: Negative   Leuk Esterase: Negative / RBC: x / WBC x   Sq Epi: x / Non Sq Epi: x / Bacteria: x

## 2019-07-03 NOTE — PHYSICAL THERAPY INITIAL EVALUATION ADULT - LEVEL OF INDEPENDENCE: GAIT, REHAB EVAL
moderate assist (50% patients effort)/minimum assist (75% patients effort)/signifcant loss of balance without self-recovery

## 2019-07-03 NOTE — CONSULT NOTE ADULT - SUBJECTIVE AND OBJECTIVE BOX
Neurology consult    ZAKIA MILLS69yMale    HPI:  68 y/o M w/ past medical history of CVA (1998, and 2016 with residual expressive aphasia and cognitive deficits), Parkinson's disease,  hypertension, DM II, Gout brought in for change in mental status.    As per pt's cartaker (Caroline fennel), pt was last seen well on Friday night. On saturday, she called the patient, but he was confused, and not responding appropriately to questions. The pt also has biting of tongue. She went to see him, and found him with "slurred speech", not responding to her questions, and clutching his chest. He was also complaining of l-sided chest tightness as per the caretaker. He has not taken any his medications for 2 days. He lives alone at home, with carteker living nearby and frequently visiting. He ambulates with a roll-aid. The caretaker organizes his meds for him, and he takes them independently.   In the hospital Sinemet was stopped.      In the ED, /113, , T99, RR 18 satting 99% on RA.  CT Head was negative for acute pathology. EKG showed NSR with L anterior fasicular block.   He received Carvedilol 12.5mg and Lisinopril 20mg while in the ED. (30 Jun 2019 23:25)        MEDICATIONS    allopurinol 100 milliGRAM(s) Oral daily  aspirin enteric coated 81 milliGRAM(s) Oral daily  atorvastatin 40 milliGRAM(s) Oral at bedtime  carbidopa/levodopa  25/100 1 Tablet(s) Oral <User Schedule>  carvedilol 12.5 milliGRAM(s) Oral every 12 hours  chlorhexidine 4% Liquid 1 Application(s) Topical <User Schedule>  enoxaparin Injectable 40 milliGRAM(s) SubCutaneous daily  entacapone 200 milliGRAM(s) Oral four times a day  lisinopril 20 milliGRAM(s) Oral two times a day  multivitamin 1 Tablet(s) Oral daily  pantoprazole    Tablet 40 milliGRAM(s) Oral before breakfast         Family history: No history of dementia, strokes, or seizures   FAMILY HISTORY:  FH: CAD (coronary artery disease)  FH: hypertension    SOCIAL HISTORY -- No history of tobacco or alcohol use     Allergies    cefaclor (Unknown)  penicillin (Unknown)  sulfa drugs (Unknown)                Vital Signs Last 24 Hrs  T(C): 36.2 (03 Jul 2019 05:46), Max: 36.4 (02 Jul 2019 14:24)  T(F): 97.2 (03 Jul 2019 05:46), Max: 97.5 (02 Jul 2019 14:24)  HR: 82 (02 Jul 2019 22:07) (69 - 90)  BP: 131/63 (03 Jul 2019 05:46) (131/63 - 146/90)  BP(mean): 109 (02 Jul 2019 14:24) (109 - 109)  RR: 20 (03 Jul 2019 05:46) (18 - 20)  SpO2: --      PHYSICAL EXAM:  General:  NAD. Neck supple. No carotid bruit. No extremity edema.  Neurological:  Mental status: awake, alert, oriented x2, disoriented to time.  Speech: aphasic  CN: II-XII intact, no nystagmus, no facial droop  Motor:  muslce power 5/5 in all 4 extremities proximally and distally, no pronator drift. Cogwheel rigidity in R hand with reinforcement.  DTRs: 2+ throughuot, plantar - toes upngoig bl  Sensory: intact to light touch and pinprick  Cerebellar: normal F/N   Gait: deferred      LABS:  CBC Full  -  ( 02 Jul 2019 07:22 )  WBC Count : 9.25 K/uL  RBC Count : 4.72 M/uL  Hemoglobin : 15.6 g/dL  Hematocrit : 44.6 %  Platelet Count - Automated : 136 K/uL  Mean Cell Volume : 94.5 fL  Mean Cell Hemoglobin : 33.1 pg  Mean Cell Hemoglobin Concentration : 35.0 g/dL  Auto Neutrophil # : 5.49 K/uL  Auto Lymphocyte # : 2.70 K/uL  Auto Monocyte # : 0.91 K/uL  Auto Eosinophil # : 0.11 K/uL  Auto Basophil # : 0.02 K/uL  Auto Neutrophil % : 59.4 %  Auto Lymphocyte % : 29.2 %  Auto Monocyte % : 9.8 %  Auto Eosinophil % : 1.2 %  Auto Basophil % : 0.2 %      07-02    141  |  102  |  8<L>  ----------------------------<  87  3.6   |  23  |  0.9    Ca    9.0      02 Jul 2019 07:22  Mg     1.7     07-02      RADIOLOGY:  Head CT  1.  No acute infarct  2.  Stable examination compared with June 30  3.  Moderate to severe microvascular ischemic change    - MRI :    Extensive periventricular and subcortical white matter chronic small  vessel ischemic changes and multiple old lacunar infarcts, No acute infarcts or intracranial hemorrhage.        EEG  General slowing c/w Diffuse encephalopathy.      Impression:   68 y/o M w/ past medical history of CVA (1998, and 2016 with residual expressive aphasia and cognitive deficits), Parkinson's disease,  hypertension, DM II, Gout brought in for acute change in mental status, confusion, bitten tongue.    -Acute change in mental status most likely secondary to s/p Seizure  -s/p Stroke with expressive aphasia  -Parkinson's disease  -r/o underlying dementia    Plan  -Continue present management  -start Keppra 250mg BID x 1wk, then 500mg BID  -restart Sinemet 25/100 TID  -continue entacapone 200mg QID  -f/u BW: TSH, B12, LFT, Ammonia level.  -case was discussed with Dr. Carr and Dr. Dominguez.  -call us if any questions.

## 2019-07-04 LAB
ANION GAP SERPL CALC-SCNC: 9 MMOL/L — SIGNIFICANT CHANGE UP (ref 7–14)
B BURGDOR C6 AB SER-ACNC: NEGATIVE — SIGNIFICANT CHANGE UP
B BURGDOR IGG+IGM SER-ACNC: 0.05 INDEX — SIGNIFICANT CHANGE UP (ref 0.01–0.89)
BASOPHILS # BLD AUTO: 0.02 K/UL — SIGNIFICANT CHANGE UP (ref 0–0.2)
BASOPHILS NFR BLD AUTO: 0.2 % — SIGNIFICANT CHANGE UP (ref 0–1)
BUN SERPL-MCNC: 14 MG/DL — SIGNIFICANT CHANGE UP (ref 10–20)
CALCIUM SERPL-MCNC: 9.2 MG/DL — SIGNIFICANT CHANGE UP (ref 8.5–10.1)
CHLORIDE SERPL-SCNC: 109 MMOL/L — SIGNIFICANT CHANGE UP (ref 98–110)
CO2 SERPL-SCNC: 29 MMOL/L — SIGNIFICANT CHANGE UP (ref 17–32)
CREAT SERPL-MCNC: 1.2 MG/DL — SIGNIFICANT CHANGE UP (ref 0.7–1.5)
EOSINOPHIL # BLD AUTO: 0.13 K/UL — SIGNIFICANT CHANGE UP (ref 0–0.7)
EOSINOPHIL NFR BLD AUTO: 1.3 % — SIGNIFICANT CHANGE UP (ref 0–8)
GLUCOSE BLDC GLUCOMTR-MCNC: 109 MG/DL — HIGH (ref 70–99)
GLUCOSE BLDC GLUCOMTR-MCNC: 125 MG/DL — HIGH (ref 70–99)
GLUCOSE BLDC GLUCOMTR-MCNC: 127 MG/DL — HIGH (ref 70–99)
GLUCOSE BLDC GLUCOMTR-MCNC: 167 MG/DL — HIGH (ref 70–99)
GLUCOSE SERPL-MCNC: 129 MG/DL — HIGH (ref 70–99)
HCT VFR BLD CALC: 45.4 % — SIGNIFICANT CHANGE UP (ref 42–52)
HGB BLD-MCNC: 15.7 G/DL — SIGNIFICANT CHANGE UP (ref 14–18)
IMM GRANULOCYTES NFR BLD AUTO: 0.3 % — SIGNIFICANT CHANGE UP (ref 0.1–0.3)
LYMPHOCYTES # BLD AUTO: 2.96 K/UL — SIGNIFICANT CHANGE UP (ref 1.2–3.4)
LYMPHOCYTES # BLD AUTO: 29.2 % — SIGNIFICANT CHANGE UP (ref 20.5–51.1)
MAGNESIUM SERPL-MCNC: 2 MG/DL — SIGNIFICANT CHANGE UP (ref 1.8–2.4)
MCHC RBC-ENTMCNC: 33.5 PG — HIGH (ref 27–31)
MCHC RBC-ENTMCNC: 34.6 G/DL — SIGNIFICANT CHANGE UP (ref 32–37)
MCV RBC AUTO: 96.8 FL — HIGH (ref 80–94)
MONOCYTES # BLD AUTO: 1.03 K/UL — HIGH (ref 0.1–0.6)
MONOCYTES NFR BLD AUTO: 10.2 % — HIGH (ref 1.7–9.3)
NEUTROPHILS # BLD AUTO: 5.96 K/UL — SIGNIFICANT CHANGE UP (ref 1.4–6.5)
NEUTROPHILS NFR BLD AUTO: 58.8 % — SIGNIFICANT CHANGE UP (ref 42.2–75.2)
NRBC # BLD: 0 /100 WBCS — SIGNIFICANT CHANGE UP (ref 0–0)
PLATELET # BLD AUTO: 143 K/UL — SIGNIFICANT CHANGE UP (ref 130–400)
POTASSIUM SERPL-MCNC: 4.6 MMOL/L — SIGNIFICANT CHANGE UP (ref 3.5–5)
POTASSIUM SERPL-SCNC: 4.6 MMOL/L — SIGNIFICANT CHANGE UP (ref 3.5–5)
RBC # BLD: 4.69 M/UL — LOW (ref 4.7–6.1)
RBC # FLD: 12.5 % — SIGNIFICANT CHANGE UP (ref 11.5–14.5)
SODIUM SERPL-SCNC: 147 MMOL/L — HIGH (ref 135–146)
WBC # BLD: 10.13 K/UL — SIGNIFICANT CHANGE UP (ref 4.8–10.8)
WBC # FLD AUTO: 10.13 K/UL — SIGNIFICANT CHANGE UP (ref 4.8–10.8)

## 2019-07-04 RX ADMIN — CARBIDOPA AND LEVODOPA 1 TABLET(S): 25; 100 TABLET ORAL at 23:17

## 2019-07-04 RX ADMIN — LISINOPRIL 20 MILLIGRAM(S): 2.5 TABLET ORAL at 17:26

## 2019-07-04 RX ADMIN — Medication 100 MILLIGRAM(S): at 11:30

## 2019-07-04 RX ADMIN — CHLORHEXIDINE GLUCONATE 1 APPLICATION(S): 213 SOLUTION TOPICAL at 05:35

## 2019-07-04 RX ADMIN — CARBIDOPA AND LEVODOPA 1 TABLET(S): 25; 100 TABLET ORAL at 15:40

## 2019-07-04 RX ADMIN — CARBIDOPA AND LEVODOPA 1 TABLET(S): 25; 100 TABLET ORAL at 11:30

## 2019-07-04 RX ADMIN — LEVETIRACETAM 250 MILLIGRAM(S): 250 TABLET, FILM COATED ORAL at 05:34

## 2019-07-04 RX ADMIN — CARVEDILOL PHOSPHATE 12.5 MILLIGRAM(S): 80 CAPSULE, EXTENDED RELEASE ORAL at 17:26

## 2019-07-04 RX ADMIN — ENTACAPONE 200 MILLIGRAM(S): 200 TABLET, FILM COATED ORAL at 17:26

## 2019-07-04 RX ADMIN — ENOXAPARIN SODIUM 40 MILLIGRAM(S): 100 INJECTION SUBCUTANEOUS at 11:30

## 2019-07-04 RX ADMIN — LEVETIRACETAM 250 MILLIGRAM(S): 250 TABLET, FILM COATED ORAL at 17:26

## 2019-07-04 RX ADMIN — LISINOPRIL 20 MILLIGRAM(S): 2.5 TABLET ORAL at 05:34

## 2019-07-04 RX ADMIN — CARVEDILOL PHOSPHATE 12.5 MILLIGRAM(S): 80 CAPSULE, EXTENDED RELEASE ORAL at 05:34

## 2019-07-04 RX ADMIN — PANTOPRAZOLE SODIUM 40 MILLIGRAM(S): 20 TABLET, DELAYED RELEASE ORAL at 08:15

## 2019-07-04 RX ADMIN — ENTACAPONE 200 MILLIGRAM(S): 200 TABLET, FILM COATED ORAL at 05:34

## 2019-07-04 RX ADMIN — Medication 1 TABLET(S): at 11:30

## 2019-07-04 RX ADMIN — ENTACAPONE 200 MILLIGRAM(S): 200 TABLET, FILM COATED ORAL at 05:28

## 2019-07-04 RX ADMIN — CARBIDOPA AND LEVODOPA 1 TABLET(S): 25; 100 TABLET ORAL at 08:15

## 2019-07-04 RX ADMIN — ATORVASTATIN CALCIUM 40 MILLIGRAM(S): 80 TABLET, FILM COATED ORAL at 23:17

## 2019-07-04 RX ADMIN — Medication 81 MILLIGRAM(S): at 11:30

## 2019-07-04 RX ADMIN — ENTACAPONE 200 MILLIGRAM(S): 200 TABLET, FILM COATED ORAL at 23:17

## 2019-07-04 RX ADMIN — ENTACAPONE 200 MILLIGRAM(S): 200 TABLET, FILM COATED ORAL at 11:30

## 2019-07-04 NOTE — PROGRESS NOTE ADULT - SUBJECTIVE AND OBJECTIVE BOX
ZAKIA MILLS  69y  Male  admitted w ms changes  has severe tongue biting lesions on both sides- most likely had a grand mal seizure while at home alone- unknown how long he was unattended, very likely all is postictal  much better- good concentration, memory and recollection except for the events prior to hospital and the first day  ct, mri- no new stroke  has vascular dementia  vitals stable, bp controlled    INTERVAL EVENTS:    T(C): 37 (07-04-19 @ 05:33), Max: 37 (07-04-19 @ 05:33)  HR: 69 (07-04-19 @ 05:33) (69 - 90)  BP: 139/80 (07-04-19 @ 05:33) (119/79 - 142/89)  RR: 18 (07-04-19 @ 05:33) (18 - 19)  SpO2: 96% (07-04-19 @ 05:33) (94% - 96%)  Wt(kg): --Vital Signs Last 24 Hrs  T(C): 37 (04 Jul 2019 05:33), Max: 37 (04 Jul 2019 05:33)  T(F): 98.6 (04 Jul 2019 05:33), Max: 98.6 (04 Jul 2019 05:33)  HR: 69 (04 Jul 2019 05:33) (69 - 90)  BP: 139/80 (04 Jul 2019 05:33) (119/79 - 142/89)  BP(mean): --  RR: 18 (04 Jul 2019 05:33) (18 - 19)  SpO2: 96% (04 Jul 2019 05:33) (94% - 96%)    PHYSICAL EXAM:  GENERAL: resting comfortably  NECK: Supple, No JVD, Normal thyroid  CHEST/LUNG: Clear; No crackles or wheezing  HEART: S1, S2, Regular rate and rhythm;   ABDOMEN: Soft, Nontender, Nondistended; Bowel sounds present  EXTREMITIES: No clubbing, cyanosis, or edema  SKIN: No rashes or lesions    LABS:                      allopurinol 100 milliGRAM(s) Oral daily  aspirin enteric coated 81 milliGRAM(s) Oral daily  atorvastatin 40 milliGRAM(s) Oral at bedtime  carbidopa/levodopa  25/100 1 Tablet(s) Oral <User Schedule>  carvedilol 12.5 milliGRAM(s) Oral every 12 hours  chlorhexidine 4% Liquid 1 Application(s) Topical <User Schedule>  enoxaparin Injectable 40 milliGRAM(s) SubCutaneous daily  entacapone 200 milliGRAM(s) Oral four times a day  levETIRAcetam 250 milliGRAM(s) Oral two times a day  lisinopril 20 milliGRAM(s) Oral two times a day  multivitamin 1 Tablet(s) Oral daily  pantoprazole    Tablet 40 milliGRAM(s) Oral before breakfast      RADIOLOGY & ADDITIONAL TESTS:    case discussed with the resident  Available consult notes reviewed    Assessment and plan:   awaiting official read on mri  medically stable to go to [facility] rehab

## 2019-07-05 ENCOUNTER — TRANSCRIPTION ENCOUNTER (OUTPATIENT)
Age: 69
End: 2019-07-05

## 2019-07-05 ENCOUNTER — INPATIENT (INPATIENT)
Facility: HOSPITAL | Age: 69
LOS: 13 days | Discharge: NURSING, REHAB OR LONG-TERM CARE | End: 2019-07-19
Attending: PHYSICAL MEDICINE & REHABILITATION | Admitting: PHYSICAL MEDICINE & REHABILITATION
Payer: MEDICARE

## 2019-07-05 VITALS
OXYGEN SATURATION: 94 % | SYSTOLIC BLOOD PRESSURE: 110 MMHG | TEMPERATURE: 99 F | HEART RATE: 90 BPM | RESPIRATION RATE: 20 BRPM | DIASTOLIC BLOOD PRESSURE: 67 MMHG

## 2019-07-05 LAB
GAMMA INTERFERON BACKGROUND BLD IA-ACNC: 0.02 IU/ML — SIGNIFICANT CHANGE UP
GLUCOSE BLDC GLUCOMTR-MCNC: 128 MG/DL — HIGH (ref 70–99)
GLUCOSE BLDC GLUCOMTR-MCNC: 178 MG/DL — HIGH (ref 70–99)
GLUCOSE BLDC GLUCOMTR-MCNC: 98 MG/DL — SIGNIFICANT CHANGE UP (ref 70–99)
M TB IFN-G BLD-IMP: NEGATIVE — SIGNIFICANT CHANGE UP
M TB IFN-G CD4+ BCKGRND COR BLD-ACNC: 0 IU/ML — SIGNIFICANT CHANGE UP
M TB IFN-G CD4+CD8+ BCKGRND COR BLD-ACNC: 0 IU/ML — SIGNIFICANT CHANGE UP
QUANT TB PLUS MITOGEN MINUS NIL: 0.68 IU/ML — SIGNIFICANT CHANGE UP

## 2019-07-05 PROCEDURE — 93010 ELECTROCARDIOGRAM REPORT: CPT

## 2019-07-05 RX ORDER — RIVASTIGMINE 4.6 MG/24H
1 PATCH, EXTENDED RELEASE TRANSDERMAL EVERY 24 HOURS
Refills: 0 | Status: DISCONTINUED | OUTPATIENT
Start: 2019-07-05 | End: 2019-07-19

## 2019-07-05 RX ORDER — LEVETIRACETAM 250 MG/1
250 TABLET, FILM COATED ORAL
Refills: 0 | Status: COMPLETED | OUTPATIENT
Start: 2019-07-05 | End: 2019-07-09

## 2019-07-05 RX ORDER — ACETAMINOPHEN 500 MG
650 TABLET ORAL EVERY 6 HOURS
Refills: 0 | Status: DISCONTINUED | OUTPATIENT
Start: 2019-07-05 | End: 2019-07-19

## 2019-07-05 RX ORDER — ENTACAPONE 200 MG/1
200 TABLET, FILM COATED ORAL
Refills: 0 | Status: DISCONTINUED | OUTPATIENT
Start: 2019-07-05 | End: 2019-07-19

## 2019-07-05 RX ORDER — ASPIRIN/CALCIUM CARB/MAGNESIUM 324 MG
81 TABLET ORAL DAILY
Refills: 0 | Status: DISCONTINUED | OUTPATIENT
Start: 2019-07-05 | End: 2019-07-19

## 2019-07-05 RX ORDER — GLUCAGON INJECTION, SOLUTION 0.5 MG/.1ML
1 INJECTION, SOLUTION SUBCUTANEOUS ONCE
Refills: 0 | Status: DISCONTINUED | OUTPATIENT
Start: 2019-07-05 | End: 2019-07-19

## 2019-07-05 RX ORDER — DEXTROSE 50 % IN WATER 50 %
15 SYRINGE (ML) INTRAVENOUS ONCE
Refills: 0 | Status: DISCONTINUED | OUTPATIENT
Start: 2019-07-05 | End: 2019-07-09

## 2019-07-05 RX ORDER — DEXTROSE 50 % IN WATER 50 %
25 SYRINGE (ML) INTRAVENOUS ONCE
Refills: 0 | Status: DISCONTINUED | OUTPATIENT
Start: 2019-07-05 | End: 2019-07-09

## 2019-07-05 RX ORDER — INSULIN LISPRO 100/ML
VIAL (ML) SUBCUTANEOUS
Refills: 0 | Status: DISCONTINUED | OUTPATIENT
Start: 2019-07-05 | End: 2019-07-09

## 2019-07-05 RX ORDER — ALLOPURINOL 300 MG
200 TABLET ORAL DAILY
Refills: 0 | Status: DISCONTINUED | OUTPATIENT
Start: 2019-07-05 | End: 2019-07-19

## 2019-07-05 RX ORDER — PANTOPRAZOLE SODIUM 20 MG/1
40 TABLET, DELAYED RELEASE ORAL
Refills: 0 | Status: DISCONTINUED | OUTPATIENT
Start: 2019-07-05 | End: 2019-07-19

## 2019-07-05 RX ORDER — LISINOPRIL 2.5 MG/1
20 TABLET ORAL EVERY 12 HOURS
Refills: 0 | Status: DISCONTINUED | OUTPATIENT
Start: 2019-07-05 | End: 2019-07-15

## 2019-07-05 RX ORDER — ATORVASTATIN CALCIUM 80 MG/1
40 TABLET, FILM COATED ORAL AT BEDTIME
Refills: 0 | Status: DISCONTINUED | OUTPATIENT
Start: 2019-07-05 | End: 2019-07-19

## 2019-07-05 RX ORDER — DEXTROSE 50 % IN WATER 50 %
12.5 SYRINGE (ML) INTRAVENOUS ONCE
Refills: 0 | Status: DISCONTINUED | OUTPATIENT
Start: 2019-07-05 | End: 2019-07-09

## 2019-07-05 RX ORDER — SODIUM CHLORIDE 9 MG/ML
1000 INJECTION, SOLUTION INTRAVENOUS
Refills: 0 | Status: DISCONTINUED | OUTPATIENT
Start: 2019-07-05 | End: 2019-07-19

## 2019-07-05 RX ORDER — METFORMIN HYDROCHLORIDE 850 MG/1
1000 TABLET ORAL
Refills: 0 | Status: DISCONTINUED | OUTPATIENT
Start: 2019-07-05 | End: 2019-07-19

## 2019-07-05 RX ORDER — DOCUSATE SODIUM 100 MG
100 CAPSULE ORAL THREE TIMES A DAY
Refills: 0 | Status: DISCONTINUED | OUTPATIENT
Start: 2019-07-05 | End: 2019-07-19

## 2019-07-05 RX ORDER — CARBIDOPA AND LEVODOPA 25; 100 MG/1; MG/1
1 TABLET ORAL
Refills: 0 | Status: DISCONTINUED | OUTPATIENT
Start: 2019-07-05 | End: 2019-07-19

## 2019-07-05 RX ORDER — CARVEDILOL PHOSPHATE 80 MG/1
12.5 CAPSULE, EXTENDED RELEASE ORAL EVERY 12 HOURS
Refills: 0 | Status: DISCONTINUED | OUTPATIENT
Start: 2019-07-05 | End: 2019-07-19

## 2019-07-05 RX ORDER — LANOLIN ALCOHOL/MO/W.PET/CERES
5 CREAM (GRAM) TOPICAL AT BEDTIME
Refills: 0 | Status: DISCONTINUED | OUTPATIENT
Start: 2019-07-05 | End: 2019-07-19

## 2019-07-05 RX ORDER — LEVETIRACETAM 250 MG/1
1 TABLET, FILM COATED ORAL
Qty: 0 | Refills: 0 | DISCHARGE
Start: 2019-07-05

## 2019-07-05 RX ORDER — RIVASTIGMINE 4.6 MG/24H
1 PATCH, EXTENDED RELEASE TRANSDERMAL
Qty: 0 | Refills: 0 | DISCHARGE

## 2019-07-05 RX ADMIN — Medication 100 MILLIGRAM(S): at 11:48

## 2019-07-05 RX ADMIN — LEVETIRACETAM 250 MILLIGRAM(S): 250 TABLET, FILM COATED ORAL at 18:51

## 2019-07-05 RX ADMIN — ENTACAPONE 200 MILLIGRAM(S): 200 TABLET, FILM COATED ORAL at 11:48

## 2019-07-05 RX ADMIN — Medication 1 TABLET(S): at 11:47

## 2019-07-05 RX ADMIN — CARBIDOPA AND LEVODOPA 1 TABLET(S): 25; 100 TABLET ORAL at 21:12

## 2019-07-05 RX ADMIN — Medication 100 MILLIGRAM(S): at 21:11

## 2019-07-05 RX ADMIN — LEVETIRACETAM 250 MILLIGRAM(S): 250 TABLET, FILM COATED ORAL at 06:24

## 2019-07-05 RX ADMIN — LISINOPRIL 20 MILLIGRAM(S): 2.5 TABLET ORAL at 06:24

## 2019-07-05 RX ADMIN — ATORVASTATIN CALCIUM 40 MILLIGRAM(S): 80 TABLET, FILM COATED ORAL at 21:11

## 2019-07-05 RX ADMIN — CARBIDOPA AND LEVODOPA 1 TABLET(S): 25; 100 TABLET ORAL at 07:52

## 2019-07-05 RX ADMIN — CARVEDILOL PHOSPHATE 12.5 MILLIGRAM(S): 80 CAPSULE, EXTENDED RELEASE ORAL at 06:24

## 2019-07-05 RX ADMIN — CARVEDILOL PHOSPHATE 12.5 MILLIGRAM(S): 80 CAPSULE, EXTENDED RELEASE ORAL at 18:50

## 2019-07-05 RX ADMIN — ENTACAPONE 200 MILLIGRAM(S): 200 TABLET, FILM COATED ORAL at 06:24

## 2019-07-05 RX ADMIN — PANTOPRAZOLE SODIUM 40 MILLIGRAM(S): 20 TABLET, DELAYED RELEASE ORAL at 07:51

## 2019-07-05 RX ADMIN — METFORMIN HYDROCHLORIDE 1000 MILLIGRAM(S): 850 TABLET ORAL at 18:49

## 2019-07-05 RX ADMIN — Medication 81 MILLIGRAM(S): at 11:47

## 2019-07-05 RX ADMIN — ENOXAPARIN SODIUM 40 MILLIGRAM(S): 100 INJECTION SUBCUTANEOUS at 11:49

## 2019-07-05 RX ADMIN — LISINOPRIL 20 MILLIGRAM(S): 2.5 TABLET ORAL at 18:50

## 2019-07-05 RX ADMIN — CARBIDOPA AND LEVODOPA 1 TABLET(S): 25; 100 TABLET ORAL at 15:36

## 2019-07-05 RX ADMIN — ENTACAPONE 200 MILLIGRAM(S): 200 TABLET, FILM COATED ORAL at 21:11

## 2019-07-05 RX ADMIN — CHLORHEXIDINE GLUCONATE 1 APPLICATION(S): 213 SOLUTION TOPICAL at 06:25

## 2019-07-05 RX ADMIN — CARBIDOPA AND LEVODOPA 1 TABLET(S): 25; 100 TABLET ORAL at 11:47

## 2019-07-05 NOTE — DISCHARGE NOTE PROVIDER - NSDCHHNEEDSERVICE_GEN_ALL_CORE
Observation and assessment/Teaching and training/Medication teaching and assessment/Rehabilitation services

## 2019-07-05 NOTE — DISCHARGE NOTE PROVIDER - NSDCCPCAREPLAN_GEN_ALL_CORE_FT
PRINCIPAL DISCHARGE DIAGNOSIS  Diagnosis: Post-ictal state  Assessment and Plan of Treatment: you had acute change in your mental status, you had a seizure and likely after that you had the change in mental state . take keppra medication for seizure as prescribed, continue taking your parkinson medications.

## 2019-07-05 NOTE — DISCHARGE NOTE NURSING/CASE MANAGEMENT/SOCIAL WORK - NSDCDPATPORTLINK_GEN_ALL_CORE
You can access the [facility], offered by [facility], by registering with the following website: [email or web address]

## 2019-07-05 NOTE — PROGRESS NOTE ADULT - SUBJECTIVE AND OBJECTIVE BOX
ZAKIA MILLS  69y  Male    admitted for ms changes  most probably postictal  mri- chronic lacunar infarcts  vascular dementia  parkinsons  gout- stable  htn- better controlled  clinically much improved-aao, comfortable    INTERVAL EVENTS:    T(C): 36.7 (07-05-19 @ 05:54), Max: 36.8 (07-04-19 @ 21:36)  HR: 78 (07-05-19 @ 05:54) (76 - 84)  BP: 131/84 (07-05-19 @ 05:54) (131/84 - 164/98)  RR: 20 (07-05-19 @ 05:54) (19 - 20)  SpO2: 96% (07-04-19 @ 21:36) (95% - 96%)  Wt(kg): --Vital Signs Last 24 Hrs  T(C): 36.7 (05 Jul 2019 05:54), Max: 36.8 (04 Jul 2019 21:36)  T(F): 98 (05 Jul 2019 05:54), Max: 98.2 (04 Jul 2019 21:36)  HR: 78 (05 Jul 2019 05:54) (76 - 84)  BP: 131/84 (05 Jul 2019 05:54) (131/84 - 164/98)  BP(mean): 98 (05 Jul 2019 05:54) (98 - 98)  RR: 20 (05 Jul 2019 05:54) (19 - 20)  SpO2: 96% (04 Jul 2019 21:36) (95% - 96%)    PHYSICAL EXAM:  GENERAL: resting comfortably  NECK: Supple, No JVD, Normal thyroid  CHEST/LUNG: Clear; No crackles or wheezing  HEART: S1, S2, Regular rate and rhythm;   ABDOMEN: Soft, Nontender, Nondistended; Bowel sounds present  EXTREMITIES: No clubbing, cyanosis, or edema  SKIN: No rashes or lesions  tongue lacerations- healing  LABS:                          15.7   10.13 )-----------( 143      ( 04 Jul 2019 07:51 )             45.4     07-04    147<H>  |  109  |  14  ----------------------------<  129<H>  4.6   |  29  |  1.2    Ca    9.2      04 Jul 2019 07:51  Mg     2.0     07-04                allopurinol 100 milliGRAM(s) Oral daily  aspirin enteric coated 81 milliGRAM(s) Oral daily  atorvastatin 40 milliGRAM(s) Oral at bedtime  carbidopa/levodopa  25/100 1 Tablet(s) Oral <User Schedule>  carvedilol 12.5 milliGRAM(s) Oral every 12 hours  chlorhexidine 4% Liquid 1 Application(s) Topical <User Schedule>  enoxaparin Injectable 40 milliGRAM(s) SubCutaneous daily  entacapone 200 milliGRAM(s) Oral four times a day  levETIRAcetam 250 milliGRAM(s) Oral two times a day  lisinopril 20 milliGRAM(s) Oral two times a day  multivitamin 1 Tablet(s) Oral daily  pantoprazole    Tablet 40 milliGRAM(s) Oral before breakfast      RADIOLOGY & ADDITIONAL TESTS:    case discussed with the resident  Available consult notes reviewed    Assessment and plan:       medically stable and cleared for transfer to  rehab once bed is available

## 2019-07-05 NOTE — DISCHARGE NOTE PROVIDER - CARE PROVIDER_API CALL
Wendy Mclean (MD)  Medicine  [address] [place] [id number]  Phone: [phone]  Fax: [phone]  Follow Up Time: 2 weeks

## 2019-07-05 NOTE — PROGRESS NOTE ADULT - SUBJECTIVE AND OBJECTIVE BOX
CHIEF COMPLAINT:    s/p seizure, AMS    INTERVAL HISTORY:    No new events since adm. Routine EEG - no sz activity, but generalized slowing present. Chart reviewed    REVIEW OF SYSTEMS:  As per HPI, otherwise negative for Constitutional, Eyes, Ears/Nose/Mouth/Throat, Neck, Cardiovascular, Respiratory, Gastrointestinal, Genitourinary, Skin, Endocrine, Musculoskeletal, Psychiatric, and Hematologic/Lymphatic.    VITAL SIGNS:  Vital Signs Last 24 Hrs  T(C): 36.7 (05 Jul 2019 05:54), Max: 36.8 (04 Jul 2019 21:36)  T(F): 98 (05 Jul 2019 05:54), Max: 98.2 (04 Jul 2019 21:36)  HR: 78 (05 Jul 2019 05:54) (76 - 84)  BP: 131/84 (05 Jul 2019 05:54) (131/84 - 164/98)  BP(mean): 98 (05 Jul 2019 05:54) (98 - 98)  RR: 20 (05 Jul 2019 05:54) (19 - 20)  SpO2: 96% (04 Jul 2019 21:36) (95% - 96%)    MEDICATIONS  (STANDING):  allopurinol 100 milliGRAM(s) Oral daily  aspirin enteric coated 81 milliGRAM(s) Oral daily  atorvastatin 40 milliGRAM(s) Oral at bedtime  carbidopa/levodopa  25/100 1 Tablet(s) Oral <User Schedule>  carvedilol 12.5 milliGRAM(s) Oral every 12 hours  chlorhexidine 4% Liquid 1 Application(s) Topical <User Schedule>  enoxaparin Injectable 40 milliGRAM(s) SubCutaneous daily  entacapone 200 milliGRAM(s) Oral four times a day  levETIRAcetam 250 milliGRAM(s) Oral two times a day  lisinopril 20 milliGRAM(s) Oral two times a day  multivitamin 1 Tablet(s) Oral daily  pantoprazole    Tablet 40 milliGRAM(s) Oral before breakfast    MEDICATIONS  (PRN):    PHYSICAL EXAM:  General:  NAD. Neck supple. No carotid bruit. No extremity edema.  Neurological:  Mental status: awake, alert, oriented x2, disoriented to time.  Speech: aphasic  CN: II-XII intact, no nystagmus, no facial droop  Motor:  muslce power 5/5 in all 4 extremities proximally and distally, no pronator drift. Cogwheel rigidity in R hand with reinforcement.  DTRs: 2+ throughuot, plantar - toes upngoig bl  Sensory: intact to light touch and pinprick  Cerebellar: normal F/N   Gait: deferred    LABS:                         15.7   10.13 )-----------( 143      ( 04 Jul 2019 07:51 )             45.4     07-04    147<H>  |  109  |  14  ----------------------------<  129<H>  4.6   |  29  |  1.2    Ca    9.2      04 Jul 2019 07:51  Mg     2.0     07-04    Thyroid Stimulating Hormone, Serum in AM (07.01.19 @ 05:30)    Thyroid Stimulating Hormone, Serum: 2.77 uIU/mL    Vitamin B12, Serum in AM (07.01.19 @ 05:30)    Vitamin B12, Serum: 533 pg/mL    Ammonia, Serum (07.01.19 @ 05:30)    Ammonia, Serum: 37 umol/L    Comprehensive Metabolic Panel (06.30.19 @ 18:30)    Sodium, Serum: 144 mmol/L    Potassium, Serum: 3.9 mmol/L    Chloride, Serum: 107 mmol/L    Carbon Dioxide, Serum: 24 mmol/L    Anion Gap, Serum: 13 mmol/L    Blood Urea Nitrogen, Serum: 10 mg/dL    Creatinine, Serum: 1.0 mg/dL    Glucose, Serum: 84 mg/dL    Calcium, Total Serum: 8.6 mg/dL    Protein Total, Serum: 6.0 g/dL    Albumin, Serum: 3.6 g/dL    Bilirubin Total, Serum: 1.3 mg/dL    Alkaline Phosphatase, Serum: 84 U/L    Aspartate Aminotransferase (AST/SGOT): 20 U/L    Alanine Aminotransferase (ALT/SGPT): 33 U/L    eGFR if Non : 78: Interpretative comment  The units for eGFR are mL/min/1.73M2 (normalized body surface area). The  eGFR is calculated from a serum creatinine using the CKD-EPI equation.  Other variables required for calculation are race, age and sex. Among  patients with chronic kidney disease (CKD), the eGFR is useful in  determining the stage of disease according to KDOQI CKD classification.  All eGFR results are reported numerically with the following  interpretation.          GFR                    With                 Without     (ml/min/1.73 m2)    Kidney Damage       Kidney Damage        >= 90                    Stage 1                     Normal        60-89                    Stage 2                     Decreased GFR        30-59     Stage 3                     Stage 3        15-29                    Stage 4                     Stage 4        < 15                      Stage 5                     Stage 5  Each stage of CKD assumes that the associated GFR level has been in  effect for at least 3 months. Determination of stages one and two (with  eGFR > 59 ml/min/m2) requires estimation of kidney damage for at least 3  months as defined by structural or functional abnormalities.  Limitations: All estimates of GFR will be less accurate for patients at  extremes of muscle mass (including but not limited to frail elderly,  critically ill, or cancer patients), those with unusual diets, and those  with conditions associated with reduced secretion or extrarenal  elimination of creatinine. The eGFR equation is not recommended for use  in patients with unstable creatinine levels. mL/min/1.73M2    eGFR if African American: 90 mL/min/1.73M2        RADIOLOGY & ADDITIONAL STUDIES:    EEG:  < from: EEG Awake or Drowsy (07.01.19 @ 18:00) >  Abnormal due to the presence of: generalized slowing as above  Clinical Correlation & Recommendations   Consistent with diffuse cerebral electrophysiological dysfunction.    Secondary to toxic metabolic cause.  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    -Acute change in mental status most likely secondary to s/p Seizure  -s/p Stroke with expressive aphasia  -Parkinson's disease  -r/o underlying dementia    Plan  - Continue present management  - please call epilepsy consult for 24hrs VEEG  - cont Keppra 250mg BID x 1wk, then 500mg BID  - cont Sinemet 25/100 TID  - continue entacapone 200mg QID  -f/u BW: TSH, B12, LFT, Ammonia level.  - if VEEG neg for epileptiform activity - neurologically stable for d/c to rehab on current dose of Keppra and f/u with neurology as OP  - please call us for any questions  D/c with Dr. Shaw

## 2019-07-05 NOTE — PROGRESS NOTE ADULT - REASON FOR ADMISSION
Change in mental status

## 2019-07-05 NOTE — PROGRESS NOTE ADULT - SUBJECTIVE AND OBJECTIVE BOX
Hospital Day:  5d    Subjective:    Patient is a 69y old  Male who presents with a chief complaint of Change in mental status (05 Jul 2019 09:30)    patient  today was sleeping in bed comfortably, he said his appetite is better, no new complaints. no acute events overnight, patient is basline for his mental status Alert and oriented. patient tongue pain is improving.      Past Medical Hx:   Gout  Diabetes mellitus  HTN (hypertension)  CVA (cerebral vascular accident)  Parkinson disease    Past Sx:  No significant past surgical history    Allergies:  cefaclor (Unknown)  penicillin (Unknown)  sulfa drugs (Unknown)    Current Meds:   [facility] Meds:  allopurinol 100 milliGRAM(s) Oral daily  aspirin enteric coated 81 milliGRAM(s) Oral daily  atorvastatin 40 milliGRAM(s) Oral at bedtime  carbidopa/levodopa  25/100 1 Tablet(s) Oral <User Schedule>  carvedilol 12.5 milliGRAM(s) Oral every 12 hours  chlorhexidine 4% Liquid 1 Application(s) Topical <User Schedule>  enoxaparin Injectable 40 milliGRAM(s) SubCutaneous daily  entacapone 200 milliGRAM(s) Oral four times a day  levETIRAcetam 250 milliGRAM(s) Oral two times a day  lisinopril 20 milliGRAM(s) Oral two times a day  multivitamin 1 Tablet(s) Oral daily  pantoprazole    Tablet 40 milliGRAM(s) Oral before breakfast    PRN Meds:    HOME MEDICATIONS:  acetaminophen 325 mg oral tablet: 2 tab(s) orally every 6 hours, As needed, Temp greater or equal to 38C (100.4F), Mild Pain (1 - 3)  aspirin 81 mg oral delayed release tablet: 1 tab(s) orally once a day  lisinopril 20 mg oral tablet: 1 tab(s) orally 2 times a day  Multiple Vitamins oral tablet: 1 tab(s) orally once a day  rivastigmine 9.5 mg/24 hr transdermal film, extended release: 1 patch transdermal once a day      Vital Signs:   T(F): 98 (07-05-19 @ 05:54), Max: 98.2 (07-04-19 @ 21:36)  HR: 78 (07-05-19 @ 05:54) (76 - 84)  BP: 131/84 (07-05-19 @ 05:54) (131/84 - 164/98)  RR: 20 (07-05-19 @ 05:54) (19 - 20)  SpO2: 96% (07-04-19 @ 21:36) (95% - 96%)      07-04-19 @ 07:01  -  07-05-19 @ 07:00  --------------------------------------------------------  IN: 360 mL / OUT: 0 mL / NET: 360 mL        Physical Exam:   GENERAL: NAD  HEENT: NCAT  CHEST/LUNG: CTAB  HEART: Regular rate and rhythm; s1 s2 appreciated, No murmurs, rubs, or gallops  ABDOMEN: Soft, Nontender, Nondistended; Bowel sounds present  EXTREMITIES: No LE edema b/l  NERVOUS SYSTEM:  Alert & Oriented X3        Labs:                         15.7   10.13 )-----------( 143      ( 04 Jul 2019 07:51 )             45.4       04 Jul 2019 07:51    147    |  109    |  14     ----------------------------<  129    4.6     |  29     |  1.2      Ca    9.2        04 Jul 2019 07:51  Mg     2.0       04 Jul 2019 07:51                Troponin <0.01, CKMB --, CK -- 07-01-19 @ 11:23                Radiology:       Assessment and Plan:     DVT ppx:    GI ppx:     Code Status:     DISPO:

## 2019-07-05 NOTE — DISCHARGE NOTE PROVIDER - HOSPITAL COURSE
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 patient had acute change in mental status after most likely post ectal state after a seizure ( patient came with a bruised tongue), patient was no responding         As per pt's cartaker (Caroline fennel), pt was last seen well on Friday night. On saturday, she called the patient, but he was confused, and not responding appropriately to questions. She went to see him, and found him with "slurred speech", not responding to her questions, and clutching his chest. He was also complaining of l-sided chest tightness as per the caretaker. He has not taken any his medications for 2 days. He lives alone at home, with carteker living nearby and frequently visiting. He ambulates with a roll-aid. The caretaker organizes his meds for him, and he takes them independently.     Upon interviewing, pt is responding to simple questions, but only AAOx1 and not able to find his words. He endorses bilateral chest tightness but is not able to elaborate with more details. He does not know why he is in the hospital, and he denies fevers, chills, sob, lightheadedness, dizziness, abdominal pain, nausea, vomiting, change in bowel or urinary habits.        In the ED, /113, , T99, RR 18 satting 99% on RA.    CT Head was negative for acute pathology. EKG showed NSR with L anterior fasicular block.     He received Carvedilol 12.5mg and Lisinopril 20mg while in the ED. 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 patient had acute change in mental status after most likely post ectal state after a seizure ( patient came with a bruised tongue), patient was no responding on the first 2 days, he was AAO*1 but improved significantly after CT head and MRI negative for acute pathology, EEG showed  presence of: generalized slowing, b12, tsh levels were normal, UA and cxr negative, patient was given keppra and continued on Carbidopa-Levodopa and entacapone home medication for parkinson . patient had also bilateral chest pain, ekg showed NSR with left anterior fascicular block, troponin negative. 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 patient had acute change in mental status after most likely post ectal state after a seizure ( patient came with a bruised tongue), patient was no responding on the first 2 days, he was AAO*1 but improved significantly after, he returned to his baseline. CT head and MRI negative for acute pathology, EEG showed  presence of: generalized slowing, b12, tsh levels were normal, UA and cxr negative, patient was given keppra and continued on Carbidopa-Levodopa and entacapone home medication for parkinson . patient had also bilateral chest pain, ekg showed NSR with left anterior fascicular block, troponin negative likely muscular in origin.

## 2019-07-05 NOTE — PROGRESS NOTE ADULT - ASSESSMENT
70 y/o M w/ past medical history of CVA (1998, and 2016 with residual expressive aphasia and cognitive deficits), Parkinson's disease,  hypertension, DM II, Gout brought in for acute change in mental status.    #Grand mal seizure   - CTH: No CT evidence of acute intracranial pathology.  - MRI showed no acute intracranial patholog ,   Folate, : 14.4 ng/mL  CXR -ve for acute pathology  -  U/A negative .   -Ammonia elevated on admission, repeat is wnl.   -seizure precautions, patient had sever tongue bruising probably due to seizure at home      #Hypertensive urgency due to missed medication doses - improving  - SBP 180s in the ED, improved after starting home meds  - C/w Carvedilol 12.5 mg q12hr and Lisinopril 20mg q12hr.    #Bilateral chest pain trauma vs muscular - resolved   - Cardiac etiology of pain is unlikely  - EKG: NSR with L anterior fasicular block.   - Trops -ve x1. F/u Trops 4.30 and 11 AM  - Tylenol for pain control     #Cerebrovascular accident (2016)  - C/w ASA 81mg and Lipitor 40mg  - Pt follows up with Dr. Chavez as outpt  -MRI negative for new stroke    #Parkinson's disease  - Pt is functional, walks with RollAid at home.  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    # change in mental status seizure, likely  post ictal- resolved  - CTH: No CT evidence of acute intracranial pathology.  - MRI showed no acute intracranial patholog ,   Folate, : 14.4 ng/mL  CXR -ve for acute pathology  -  U/A negative .   -Ammonia elevated on admission, repeat is wnl.   -seizure precautions, patient had sever tongue bruising probably due to seizure at home      #Hypertensive urgency due to missed medication doses - improving  - SBP 180s in the ED, improved after starting home meds  - C/w Carvedilol 12.5 mg q12hr and Lisinopril 20mg q12hr.    #Bilateral chest pain trauma vs muscular - resolved   - Cardiac etiology of pain is unlikely  - EKG: NSR with L anterior fasicular block.   - Trops -ve x1. F/u Trops 4.30 and 11 AM  - Tylenol for pain control     #Cerebrovascular accident (2016)  - C/w ASA 81mg and Lipitor 40mg  - Pt follows up with Dr. Chavez as outpt  -MRI negative for new stroke    #Parkinson's disease  - Pt is functional, walks with RollAid at home.  -Carbidopa-Levodopa stopped on admission but now resumed  - ENtacapone , and Rivastigmine  -patient started on keppra 200 BID for 7 days, then it will be increased to 500 BID     #DM II  - FS qAC and qHS  Start insulin sliding scale if FS>180    Diet: DASH/ CC  Prophylaxis: Lovenox SQ    #Dispo: rehab    #Code Status: Full    -patient case is discussed with Hilario (care taker )  --4 A rehab

## 2019-07-05 NOTE — PROGRESS NOTE ADULT - PROVIDER SPECIALTY LIST ADULT
Internal Medicine
Neurology
Neurology
Internal Medicine
Internal Medicine

## 2019-07-06 LAB
24R-OH-CALCIDIOL SERPL-MCNC: 34 NG/ML — SIGNIFICANT CHANGE UP (ref 30–80)
ALBUMIN SERPL ELPH-MCNC: 3.2 G/DL — LOW (ref 3.5–5.2)
ALP SERPL-CCNC: 79 U/L — SIGNIFICANT CHANGE UP (ref 30–115)
ALT FLD-CCNC: 6 U/L — SIGNIFICANT CHANGE UP (ref 0–41)
ANION GAP SERPL CALC-SCNC: 11 MMOL/L — SIGNIFICANT CHANGE UP (ref 7–14)
AST SERPL-CCNC: 14 U/L — SIGNIFICANT CHANGE UP (ref 0–41)
BILIRUB SERPL-MCNC: 1.3 MG/DL — HIGH (ref 0.2–1.2)
BUN SERPL-MCNC: 12 MG/DL — SIGNIFICANT CHANGE UP (ref 10–20)
CALCIUM SERPL-MCNC: 9.1 MG/DL — SIGNIFICANT CHANGE UP (ref 8.5–10.1)
CHLORIDE SERPL-SCNC: 108 MMOL/L — SIGNIFICANT CHANGE UP (ref 98–110)
CO2 SERPL-SCNC: 24 MMOL/L — SIGNIFICANT CHANGE UP (ref 17–32)
CREAT SERPL-MCNC: 1 MG/DL — SIGNIFICANT CHANGE UP (ref 0.7–1.5)
ESTIMATED AVERAGE GLUCOSE: 117 MG/DL — HIGH (ref 68–114)
GLUCOSE BLDC GLUCOMTR-MCNC: 103 MG/DL — HIGH (ref 70–99)
GLUCOSE BLDC GLUCOMTR-MCNC: 106 MG/DL — HIGH (ref 70–99)
GLUCOSE BLDC GLUCOMTR-MCNC: 150 MG/DL — HIGH (ref 70–99)
GLUCOSE BLDC GLUCOMTR-MCNC: 152 MG/DL — HIGH (ref 70–99)
GLUCOSE SERPL-MCNC: 116 MG/DL — HIGH (ref 70–99)
HBA1C BLD-MCNC: 5.7 % — HIGH (ref 4–5.6)
HCT VFR BLD CALC: 45.7 % — SIGNIFICANT CHANGE UP (ref 42–52)
HGB BLD-MCNC: 15.5 G/DL — SIGNIFICANT CHANGE UP (ref 14–18)
MAGNESIUM SERPL-MCNC: 1.7 MG/DL — LOW (ref 1.8–2.4)
MCHC RBC-ENTMCNC: 32.6 PG — HIGH (ref 27–31)
MCHC RBC-ENTMCNC: 33.9 G/DL — SIGNIFICANT CHANGE UP (ref 32–37)
MCV RBC AUTO: 96 FL — HIGH (ref 80–94)
NRBC # BLD: 0 /100 WBCS — SIGNIFICANT CHANGE UP (ref 0–0)
PHOSPHATE SERPL-MCNC: 4 MG/DL — SIGNIFICANT CHANGE UP (ref 2.1–4.9)
PLATELET # BLD AUTO: 150 K/UL — SIGNIFICANT CHANGE UP (ref 130–400)
POTASSIUM SERPL-MCNC: 4.4 MMOL/L — SIGNIFICANT CHANGE UP (ref 3.5–5)
POTASSIUM SERPL-SCNC: 4.4 MMOL/L — SIGNIFICANT CHANGE UP (ref 3.5–5)
PROT SERPL-MCNC: 5.6 G/DL — LOW (ref 6–8)
RBC # BLD: 4.76 M/UL — SIGNIFICANT CHANGE UP (ref 4.7–6.1)
RBC # FLD: 12.3 % — SIGNIFICANT CHANGE UP (ref 11.5–14.5)
SODIUM SERPL-SCNC: 143 MMOL/L — SIGNIFICANT CHANGE UP (ref 135–146)
WBC # BLD: 9.66 K/UL — SIGNIFICANT CHANGE UP (ref 4.8–10.8)
WBC # FLD AUTO: 9.66 K/UL — SIGNIFICANT CHANGE UP (ref 4.8–10.8)

## 2019-07-06 RX ORDER — LEVETIRACETAM 250 MG/1
500 TABLET, FILM COATED ORAL
Refills: 0 | Status: DISCONTINUED | OUTPATIENT
Start: 2019-07-10 | End: 2019-07-19

## 2019-07-06 RX ADMIN — CARVEDILOL PHOSPHATE 12.5 MILLIGRAM(S): 80 CAPSULE, EXTENDED RELEASE ORAL at 17:29

## 2019-07-06 RX ADMIN — Medication 100 MILLIGRAM(S): at 21:37

## 2019-07-06 RX ADMIN — CARVEDILOL PHOSPHATE 12.5 MILLIGRAM(S): 80 CAPSULE, EXTENDED RELEASE ORAL at 05:32

## 2019-07-06 RX ADMIN — RIVASTIGMINE 1 PATCH: 4.6 PATCH, EXTENDED RELEASE TRANSDERMAL at 20:21

## 2019-07-06 RX ADMIN — RIVASTIGMINE 1 PATCH: 4.6 PATCH, EXTENDED RELEASE TRANSDERMAL at 12:19

## 2019-07-06 RX ADMIN — ENTACAPONE 200 MILLIGRAM(S): 200 TABLET, FILM COATED ORAL at 07:43

## 2019-07-06 RX ADMIN — Medication 1 TABLET(S): at 12:19

## 2019-07-06 RX ADMIN — ATORVASTATIN CALCIUM 40 MILLIGRAM(S): 80 TABLET, FILM COATED ORAL at 21:37

## 2019-07-06 RX ADMIN — Medication 100 MILLIGRAM(S): at 05:31

## 2019-07-06 RX ADMIN — LISINOPRIL 20 MILLIGRAM(S): 2.5 TABLET ORAL at 05:33

## 2019-07-06 RX ADMIN — METFORMIN HYDROCHLORIDE 1000 MILLIGRAM(S): 850 TABLET ORAL at 05:32

## 2019-07-06 RX ADMIN — Medication 2: at 17:30

## 2019-07-06 RX ADMIN — ENTACAPONE 200 MILLIGRAM(S): 200 TABLET, FILM COATED ORAL at 12:18

## 2019-07-06 RX ADMIN — Medication 81 MILLIGRAM(S): at 12:19

## 2019-07-06 RX ADMIN — CARBIDOPA AND LEVODOPA 1 TABLET(S): 25; 100 TABLET ORAL at 20:17

## 2019-07-06 RX ADMIN — LEVETIRACETAM 250 MILLIGRAM(S): 250 TABLET, FILM COATED ORAL at 05:32

## 2019-07-06 RX ADMIN — LEVETIRACETAM 250 MILLIGRAM(S): 250 TABLET, FILM COATED ORAL at 17:30

## 2019-07-06 RX ADMIN — CARBIDOPA AND LEVODOPA 1 TABLET(S): 25; 100 TABLET ORAL at 12:19

## 2019-07-06 RX ADMIN — Medication 100 MILLIGRAM(S): at 12:19

## 2019-07-06 RX ADMIN — PANTOPRAZOLE SODIUM 40 MILLIGRAM(S): 20 TABLET, DELAYED RELEASE ORAL at 05:34

## 2019-07-06 RX ADMIN — METFORMIN HYDROCHLORIDE 1000 MILLIGRAM(S): 850 TABLET ORAL at 17:31

## 2019-07-06 RX ADMIN — ENTACAPONE 200 MILLIGRAM(S): 200 TABLET, FILM COATED ORAL at 20:17

## 2019-07-06 RX ADMIN — Medication 200 MILLIGRAM(S): at 12:19

## 2019-07-06 RX ADMIN — ENTACAPONE 200 MILLIGRAM(S): 200 TABLET, FILM COATED ORAL at 17:28

## 2019-07-06 RX ADMIN — LISINOPRIL 20 MILLIGRAM(S): 2.5 TABLET ORAL at 17:31

## 2019-07-06 RX ADMIN — CARBIDOPA AND LEVODOPA 1 TABLET(S): 25; 100 TABLET ORAL at 17:28

## 2019-07-06 RX ADMIN — CARBIDOPA AND LEVODOPA 1 TABLET(S): 25; 100 TABLET ORAL at 07:43

## 2019-07-07 LAB
APPEARANCE UR: CLEAR — SIGNIFICANT CHANGE UP
BILIRUB UR-MCNC: NEGATIVE — SIGNIFICANT CHANGE UP
COLOR SPEC: SIGNIFICANT CHANGE UP
DIFF PNL FLD: NEGATIVE — SIGNIFICANT CHANGE UP
GLUCOSE BLDC GLUCOMTR-MCNC: 105 MG/DL — HIGH (ref 70–99)
GLUCOSE BLDC GLUCOMTR-MCNC: 141 MG/DL — HIGH (ref 70–99)
GLUCOSE BLDC GLUCOMTR-MCNC: 85 MG/DL — SIGNIFICANT CHANGE UP (ref 70–99)
GLUCOSE BLDC GLUCOMTR-MCNC: 93 MG/DL — SIGNIFICANT CHANGE UP (ref 70–99)
GLUCOSE BLDC GLUCOMTR-MCNC: 99 MG/DL — SIGNIFICANT CHANGE UP (ref 70–99)
GLUCOSE UR QL: NEGATIVE MG/DL — SIGNIFICANT CHANGE UP
KETONES UR-MCNC: ABNORMAL
LEUKOCYTE ESTERASE UR-ACNC: ABNORMAL
NITRITE UR-MCNC: NEGATIVE — SIGNIFICANT CHANGE UP
PH UR: 6 — SIGNIFICANT CHANGE UP (ref 5–8)
PROT UR-MCNC: NEGATIVE MG/DL — SIGNIFICANT CHANGE UP
SP GR SPEC: 1.02 — SIGNIFICANT CHANGE UP (ref 1.01–1.03)
UROBILINOGEN FLD QL: 0.2 MG/DL — SIGNIFICANT CHANGE UP (ref 0.2–0.2)

## 2019-07-07 RX ADMIN — METFORMIN HYDROCHLORIDE 1000 MILLIGRAM(S): 850 TABLET ORAL at 17:06

## 2019-07-07 RX ADMIN — Medication 100 MILLIGRAM(S): at 11:20

## 2019-07-07 RX ADMIN — Medication 100 MILLIGRAM(S): at 21:40

## 2019-07-07 RX ADMIN — Medication 81 MILLIGRAM(S): at 11:19

## 2019-07-07 RX ADMIN — CARBIDOPA AND LEVODOPA 1 TABLET(S): 25; 100 TABLET ORAL at 11:19

## 2019-07-07 RX ADMIN — RIVASTIGMINE 1 PATCH: 4.6 PATCH, EXTENDED RELEASE TRANSDERMAL at 12:29

## 2019-07-07 RX ADMIN — LISINOPRIL 20 MILLIGRAM(S): 2.5 TABLET ORAL at 17:05

## 2019-07-07 RX ADMIN — LEVETIRACETAM 250 MILLIGRAM(S): 250 TABLET, FILM COATED ORAL at 17:06

## 2019-07-07 RX ADMIN — CARBIDOPA AND LEVODOPA 1 TABLET(S): 25; 100 TABLET ORAL at 07:45

## 2019-07-07 RX ADMIN — CARBIDOPA AND LEVODOPA 1 TABLET(S): 25; 100 TABLET ORAL at 16:13

## 2019-07-07 RX ADMIN — Medication 100 MILLIGRAM(S): at 06:31

## 2019-07-07 RX ADMIN — METFORMIN HYDROCHLORIDE 1000 MILLIGRAM(S): 850 TABLET ORAL at 07:45

## 2019-07-07 RX ADMIN — CARBIDOPA AND LEVODOPA 1 TABLET(S): 25; 100 TABLET ORAL at 21:40

## 2019-07-07 RX ADMIN — ENTACAPONE 200 MILLIGRAM(S): 200 TABLET, FILM COATED ORAL at 16:13

## 2019-07-07 RX ADMIN — ATORVASTATIN CALCIUM 40 MILLIGRAM(S): 80 TABLET, FILM COATED ORAL at 21:40

## 2019-07-07 RX ADMIN — CARVEDILOL PHOSPHATE 12.5 MILLIGRAM(S): 80 CAPSULE, EXTENDED RELEASE ORAL at 17:06

## 2019-07-07 RX ADMIN — ENTACAPONE 200 MILLIGRAM(S): 200 TABLET, FILM COATED ORAL at 21:40

## 2019-07-07 RX ADMIN — PANTOPRAZOLE SODIUM 40 MILLIGRAM(S): 20 TABLET, DELAYED RELEASE ORAL at 06:31

## 2019-07-07 RX ADMIN — RIVASTIGMINE 1 PATCH: 4.6 PATCH, EXTENDED RELEASE TRANSDERMAL at 17:06

## 2019-07-07 RX ADMIN — Medication 1 TABLET(S): at 11:18

## 2019-07-07 RX ADMIN — CARVEDILOL PHOSPHATE 12.5 MILLIGRAM(S): 80 CAPSULE, EXTENDED RELEASE ORAL at 06:31

## 2019-07-07 RX ADMIN — ENTACAPONE 200 MILLIGRAM(S): 200 TABLET, FILM COATED ORAL at 11:18

## 2019-07-07 RX ADMIN — ENTACAPONE 200 MILLIGRAM(S): 200 TABLET, FILM COATED ORAL at 07:45

## 2019-07-07 RX ADMIN — Medication 200 MILLIGRAM(S): at 11:19

## 2019-07-07 RX ADMIN — LISINOPRIL 20 MILLIGRAM(S): 2.5 TABLET ORAL at 06:31

## 2019-07-07 RX ADMIN — LEVETIRACETAM 250 MILLIGRAM(S): 250 TABLET, FILM COATED ORAL at 06:31

## 2019-07-08 LAB
ANION GAP SERPL CALC-SCNC: 14 MMOL/L — SIGNIFICANT CHANGE UP (ref 7–14)
BUN SERPL-MCNC: 12 MG/DL — SIGNIFICANT CHANGE UP (ref 10–20)
CALCIUM SERPL-MCNC: 8.9 MG/DL — SIGNIFICANT CHANGE UP (ref 8.5–10.1)
CHLORIDE SERPL-SCNC: 104 MMOL/L — SIGNIFICANT CHANGE UP (ref 98–110)
CO2 SERPL-SCNC: 22 MMOL/L — SIGNIFICANT CHANGE UP (ref 17–32)
COD CRY URNS QL: ABNORMAL /HPF
CREAT SERPL-MCNC: 1 MG/DL — SIGNIFICANT CHANGE UP (ref 0.7–1.5)
GLUCOSE BLDC GLUCOMTR-MCNC: 100 MG/DL — HIGH (ref 70–99)
GLUCOSE BLDC GLUCOMTR-MCNC: 102 MG/DL — HIGH (ref 70–99)
GLUCOSE BLDC GLUCOMTR-MCNC: 112 MG/DL — HIGH (ref 70–99)
GLUCOSE BLDC GLUCOMTR-MCNC: 206 MG/DL — HIGH (ref 70–99)
GLUCOSE SERPL-MCNC: 110 MG/DL — HIGH (ref 70–99)
HCT VFR BLD CALC: 45.2 % — SIGNIFICANT CHANGE UP (ref 42–52)
HGB BLD-MCNC: 15.4 G/DL — SIGNIFICANT CHANGE UP (ref 14–18)
MCHC RBC-ENTMCNC: 32.6 PG — HIGH (ref 27–31)
MCHC RBC-ENTMCNC: 34.1 G/DL — SIGNIFICANT CHANGE UP (ref 32–37)
MCV RBC AUTO: 95.8 FL — HIGH (ref 80–94)
NRBC # BLD: 0 /100 WBCS — SIGNIFICANT CHANGE UP (ref 0–0)
PLATELET # BLD AUTO: 166 K/UL — SIGNIFICANT CHANGE UP (ref 130–400)
POTASSIUM SERPL-MCNC: 4.2 MMOL/L — SIGNIFICANT CHANGE UP (ref 3.5–5)
POTASSIUM SERPL-SCNC: 4.2 MMOL/L — SIGNIFICANT CHANGE UP (ref 3.5–5)
RBC # BLD: 4.72 M/UL — SIGNIFICANT CHANGE UP (ref 4.7–6.1)
RBC # FLD: 11.9 % — SIGNIFICANT CHANGE UP (ref 11.5–14.5)
SODIUM SERPL-SCNC: 140 MMOL/L — SIGNIFICANT CHANGE UP (ref 135–146)
WBC # BLD: 8.89 K/UL — SIGNIFICANT CHANGE UP (ref 4.8–10.8)
WBC # FLD AUTO: 8.89 K/UL — SIGNIFICANT CHANGE UP (ref 4.8–10.8)
WBC UR QL: SIGNIFICANT CHANGE UP /HPF

## 2019-07-08 RX ADMIN — ENTACAPONE 200 MILLIGRAM(S): 200 TABLET, FILM COATED ORAL at 16:16

## 2019-07-08 RX ADMIN — RIVASTIGMINE 1 PATCH: 4.6 PATCH, EXTENDED RELEASE TRANSDERMAL at 06:36

## 2019-07-08 RX ADMIN — Medication 100 MILLIGRAM(S): at 06:31

## 2019-07-08 RX ADMIN — METFORMIN HYDROCHLORIDE 1000 MILLIGRAM(S): 850 TABLET ORAL at 08:14

## 2019-07-08 RX ADMIN — Medication 81 MILLIGRAM(S): at 12:14

## 2019-07-08 RX ADMIN — Medication 200 MILLIGRAM(S): at 12:14

## 2019-07-08 RX ADMIN — Medication 100 MILLIGRAM(S): at 21:24

## 2019-07-08 RX ADMIN — CARBIDOPA AND LEVODOPA 1 TABLET(S): 25; 100 TABLET ORAL at 08:14

## 2019-07-08 RX ADMIN — ENTACAPONE 200 MILLIGRAM(S): 200 TABLET, FILM COATED ORAL at 21:24

## 2019-07-08 RX ADMIN — CARBIDOPA AND LEVODOPA 1 TABLET(S): 25; 100 TABLET ORAL at 12:14

## 2019-07-08 RX ADMIN — CARVEDILOL PHOSPHATE 12.5 MILLIGRAM(S): 80 CAPSULE, EXTENDED RELEASE ORAL at 06:33

## 2019-07-08 RX ADMIN — LEVETIRACETAM 250 MILLIGRAM(S): 250 TABLET, FILM COATED ORAL at 17:16

## 2019-07-08 RX ADMIN — ENTACAPONE 200 MILLIGRAM(S): 200 TABLET, FILM COATED ORAL at 12:14

## 2019-07-08 RX ADMIN — CARBIDOPA AND LEVODOPA 1 TABLET(S): 25; 100 TABLET ORAL at 21:25

## 2019-07-08 RX ADMIN — ATORVASTATIN CALCIUM 40 MILLIGRAM(S): 80 TABLET, FILM COATED ORAL at 21:25

## 2019-07-08 RX ADMIN — Medication 4: at 12:23

## 2019-07-08 RX ADMIN — RIVASTIGMINE 1 PATCH: 4.6 PATCH, EXTENDED RELEASE TRANSDERMAL at 12:16

## 2019-07-08 RX ADMIN — LEVETIRACETAM 250 MILLIGRAM(S): 250 TABLET, FILM COATED ORAL at 06:31

## 2019-07-08 RX ADMIN — RIVASTIGMINE 1 PATCH: 4.6 PATCH, EXTENDED RELEASE TRANSDERMAL at 12:14

## 2019-07-08 RX ADMIN — RIVASTIGMINE 1 PATCH: 4.6 PATCH, EXTENDED RELEASE TRANSDERMAL at 06:40

## 2019-07-08 RX ADMIN — PANTOPRAZOLE SODIUM 40 MILLIGRAM(S): 20 TABLET, DELAYED RELEASE ORAL at 06:31

## 2019-07-08 RX ADMIN — LISINOPRIL 20 MILLIGRAM(S): 2.5 TABLET ORAL at 06:31

## 2019-07-08 RX ADMIN — CARVEDILOL PHOSPHATE 12.5 MILLIGRAM(S): 80 CAPSULE, EXTENDED RELEASE ORAL at 17:17

## 2019-07-08 RX ADMIN — ENTACAPONE 200 MILLIGRAM(S): 200 TABLET, FILM COATED ORAL at 08:14

## 2019-07-08 RX ADMIN — LISINOPRIL 20 MILLIGRAM(S): 2.5 TABLET ORAL at 17:16

## 2019-07-08 RX ADMIN — Medication 1 TABLET(S): at 12:14

## 2019-07-08 RX ADMIN — METFORMIN HYDROCHLORIDE 1000 MILLIGRAM(S): 850 TABLET ORAL at 17:16

## 2019-07-08 RX ADMIN — RIVASTIGMINE 1 PATCH: 4.6 PATCH, EXTENDED RELEASE TRANSDERMAL at 21:26

## 2019-07-08 RX ADMIN — CARBIDOPA AND LEVODOPA 1 TABLET(S): 25; 100 TABLET ORAL at 16:16

## 2019-07-08 RX ADMIN — Medication 100 MILLIGRAM(S): at 13:47

## 2019-07-09 LAB
GLUCOSE BLDC GLUCOMTR-MCNC: 101 MG/DL — HIGH (ref 70–99)
GLUCOSE BLDC GLUCOMTR-MCNC: 122 MG/DL — HIGH (ref 70–99)
GLUCOSE BLDC GLUCOMTR-MCNC: 127 MG/DL — HIGH (ref 70–99)
GLUCOSE BLDC GLUCOMTR-MCNC: 94 MG/DL — SIGNIFICANT CHANGE UP (ref 70–99)

## 2019-07-09 RX ORDER — ENOXAPARIN SODIUM 100 MG/ML
40 INJECTION SUBCUTANEOUS DAILY
Refills: 0 | Status: DISCONTINUED | OUTPATIENT
Start: 2019-07-09 | End: 2019-07-19

## 2019-07-09 RX ADMIN — Medication 100 MILLIGRAM(S): at 21:36

## 2019-07-09 RX ADMIN — METFORMIN HYDROCHLORIDE 1000 MILLIGRAM(S): 850 TABLET ORAL at 17:13

## 2019-07-09 RX ADMIN — ENTACAPONE 200 MILLIGRAM(S): 200 TABLET, FILM COATED ORAL at 17:14

## 2019-07-09 RX ADMIN — ENTACAPONE 200 MILLIGRAM(S): 200 TABLET, FILM COATED ORAL at 21:36

## 2019-07-09 RX ADMIN — CARBIDOPA AND LEVODOPA 1 TABLET(S): 25; 100 TABLET ORAL at 07:55

## 2019-07-09 RX ADMIN — ENTACAPONE 200 MILLIGRAM(S): 200 TABLET, FILM COATED ORAL at 07:55

## 2019-07-09 RX ADMIN — ATORVASTATIN CALCIUM 40 MILLIGRAM(S): 80 TABLET, FILM COATED ORAL at 21:37

## 2019-07-09 RX ADMIN — Medication 81 MILLIGRAM(S): at 12:31

## 2019-07-09 RX ADMIN — RIVASTIGMINE 1 PATCH: 4.6 PATCH, EXTENDED RELEASE TRANSDERMAL at 12:35

## 2019-07-09 RX ADMIN — CARVEDILOL PHOSPHATE 12.5 MILLIGRAM(S): 80 CAPSULE, EXTENDED RELEASE ORAL at 17:14

## 2019-07-09 RX ADMIN — Medication 200 MILLIGRAM(S): at 12:34

## 2019-07-09 RX ADMIN — CARVEDILOL PHOSPHATE 12.5 MILLIGRAM(S): 80 CAPSULE, EXTENDED RELEASE ORAL at 05:44

## 2019-07-09 RX ADMIN — RIVASTIGMINE 1 PATCH: 4.6 PATCH, EXTENDED RELEASE TRANSDERMAL at 20:06

## 2019-07-09 RX ADMIN — CARBIDOPA AND LEVODOPA 1 TABLET(S): 25; 100 TABLET ORAL at 17:14

## 2019-07-09 RX ADMIN — Medication 100 MILLIGRAM(S): at 05:44

## 2019-07-09 RX ADMIN — ENTACAPONE 200 MILLIGRAM(S): 200 TABLET, FILM COATED ORAL at 12:31

## 2019-07-09 RX ADMIN — LEVETIRACETAM 250 MILLIGRAM(S): 250 TABLET, FILM COATED ORAL at 05:44

## 2019-07-09 RX ADMIN — CARBIDOPA AND LEVODOPA 1 TABLET(S): 25; 100 TABLET ORAL at 12:31

## 2019-07-09 RX ADMIN — PANTOPRAZOLE SODIUM 40 MILLIGRAM(S): 20 TABLET, DELAYED RELEASE ORAL at 07:55

## 2019-07-09 RX ADMIN — LISINOPRIL 20 MILLIGRAM(S): 2.5 TABLET ORAL at 05:44

## 2019-07-09 RX ADMIN — Medication 1 TABLET(S): at 12:31

## 2019-07-09 RX ADMIN — Medication 100 MILLIGRAM(S): at 13:27

## 2019-07-09 RX ADMIN — RIVASTIGMINE 1 PATCH: 4.6 PATCH, EXTENDED RELEASE TRANSDERMAL at 07:57

## 2019-07-09 RX ADMIN — METFORMIN HYDROCHLORIDE 1000 MILLIGRAM(S): 850 TABLET ORAL at 05:44

## 2019-07-09 RX ADMIN — CARBIDOPA AND LEVODOPA 1 TABLET(S): 25; 100 TABLET ORAL at 21:37

## 2019-07-09 RX ADMIN — LISINOPRIL 20 MILLIGRAM(S): 2.5 TABLET ORAL at 17:14

## 2019-07-10 LAB
GLUCOSE BLDC GLUCOMTR-MCNC: 115 MG/DL — HIGH (ref 70–99)
GLUCOSE BLDC GLUCOMTR-MCNC: 148 MG/DL — HIGH (ref 70–99)
GLUCOSE BLDC GLUCOMTR-MCNC: 150 MG/DL — HIGH (ref 70–99)
GLUCOSE BLDC GLUCOMTR-MCNC: 187 MG/DL — HIGH (ref 70–99)

## 2019-07-10 PROCEDURE — 73100 X-RAY EXAM OF WRIST: CPT | Mod: 26,LT

## 2019-07-10 RX ADMIN — PANTOPRAZOLE SODIUM 40 MILLIGRAM(S): 20 TABLET, DELAYED RELEASE ORAL at 07:51

## 2019-07-10 RX ADMIN — LISINOPRIL 20 MILLIGRAM(S): 2.5 TABLET ORAL at 17:47

## 2019-07-10 RX ADMIN — ENOXAPARIN SODIUM 40 MILLIGRAM(S): 100 INJECTION SUBCUTANEOUS at 12:56

## 2019-07-10 RX ADMIN — LEVETIRACETAM 500 MILLIGRAM(S): 250 TABLET, FILM COATED ORAL at 17:46

## 2019-07-10 RX ADMIN — Medication 81 MILLIGRAM(S): at 12:54

## 2019-07-10 RX ADMIN — Medication 200 MILLIGRAM(S): at 12:56

## 2019-07-10 RX ADMIN — LEVETIRACETAM 500 MILLIGRAM(S): 250 TABLET, FILM COATED ORAL at 05:46

## 2019-07-10 RX ADMIN — RIVASTIGMINE 1 PATCH: 4.6 PATCH, EXTENDED RELEASE TRANSDERMAL at 13:43

## 2019-07-10 RX ADMIN — Medication 1 TABLET(S): at 12:55

## 2019-07-10 RX ADMIN — LISINOPRIL 20 MILLIGRAM(S): 2.5 TABLET ORAL at 05:44

## 2019-07-10 RX ADMIN — CARBIDOPA AND LEVODOPA 1 TABLET(S): 25; 100 TABLET ORAL at 12:55

## 2019-07-10 RX ADMIN — METFORMIN HYDROCHLORIDE 1000 MILLIGRAM(S): 850 TABLET ORAL at 17:46

## 2019-07-10 RX ADMIN — Medication 100 MILLIGRAM(S): at 12:56

## 2019-07-10 RX ADMIN — ENTACAPONE 200 MILLIGRAM(S): 200 TABLET, FILM COATED ORAL at 17:46

## 2019-07-10 RX ADMIN — CARBIDOPA AND LEVODOPA 1 TABLET(S): 25; 100 TABLET ORAL at 07:51

## 2019-07-10 RX ADMIN — Medication 100 MILLIGRAM(S): at 05:46

## 2019-07-10 RX ADMIN — CARBIDOPA AND LEVODOPA 1 TABLET(S): 25; 100 TABLET ORAL at 17:46

## 2019-07-10 RX ADMIN — ENTACAPONE 200 MILLIGRAM(S): 200 TABLET, FILM COATED ORAL at 20:12

## 2019-07-10 RX ADMIN — ENTACAPONE 200 MILLIGRAM(S): 200 TABLET, FILM COATED ORAL at 07:51

## 2019-07-10 RX ADMIN — CARVEDILOL PHOSPHATE 12.5 MILLIGRAM(S): 80 CAPSULE, EXTENDED RELEASE ORAL at 17:47

## 2019-07-10 RX ADMIN — CARBIDOPA AND LEVODOPA 1 TABLET(S): 25; 100 TABLET ORAL at 20:12

## 2019-07-10 RX ADMIN — Medication 100 MILLIGRAM(S): at 20:12

## 2019-07-10 RX ADMIN — ATORVASTATIN CALCIUM 40 MILLIGRAM(S): 80 TABLET, FILM COATED ORAL at 20:12

## 2019-07-10 RX ADMIN — METFORMIN HYDROCHLORIDE 1000 MILLIGRAM(S): 850 TABLET ORAL at 05:44

## 2019-07-10 RX ADMIN — RIVASTIGMINE 1 PATCH: 4.6 PATCH, EXTENDED RELEASE TRANSDERMAL at 12:56

## 2019-07-10 RX ADMIN — ENTACAPONE 200 MILLIGRAM(S): 200 TABLET, FILM COATED ORAL at 12:55

## 2019-07-10 RX ADMIN — RIVASTIGMINE 1 PATCH: 4.6 PATCH, EXTENDED RELEASE TRANSDERMAL at 07:52

## 2019-07-10 RX ADMIN — RIVASTIGMINE 1 PATCH: 4.6 PATCH, EXTENDED RELEASE TRANSDERMAL at 20:08

## 2019-07-11 LAB
GLUCOSE BLDC GLUCOMTR-MCNC: 65 MG/DL — LOW (ref 70–99)
GLUCOSE BLDC GLUCOMTR-MCNC: 76 MG/DL — SIGNIFICANT CHANGE UP (ref 70–99)
GLUCOSE BLDC GLUCOMTR-MCNC: 88 MG/DL — SIGNIFICANT CHANGE UP (ref 70–99)
GLUCOSE BLDC GLUCOMTR-MCNC: 95 MG/DL — SIGNIFICANT CHANGE UP (ref 70–99)

## 2019-07-11 RX ADMIN — Medication 100 MILLIGRAM(S): at 21:37

## 2019-07-11 RX ADMIN — CARVEDILOL PHOSPHATE 12.5 MILLIGRAM(S): 80 CAPSULE, EXTENDED RELEASE ORAL at 06:03

## 2019-07-11 RX ADMIN — METFORMIN HYDROCHLORIDE 1000 MILLIGRAM(S): 850 TABLET ORAL at 17:34

## 2019-07-11 RX ADMIN — LISINOPRIL 20 MILLIGRAM(S): 2.5 TABLET ORAL at 06:03

## 2019-07-11 RX ADMIN — ENOXAPARIN SODIUM 40 MILLIGRAM(S): 100 INJECTION SUBCUTANEOUS at 12:53

## 2019-07-11 RX ADMIN — RIVASTIGMINE 1 PATCH: 4.6 PATCH, EXTENDED RELEASE TRANSDERMAL at 12:56

## 2019-07-11 RX ADMIN — Medication 1 TABLET(S): at 12:54

## 2019-07-11 RX ADMIN — CARBIDOPA AND LEVODOPA 1 TABLET(S): 25; 100 TABLET ORAL at 17:34

## 2019-07-11 RX ADMIN — PANTOPRAZOLE SODIUM 40 MILLIGRAM(S): 20 TABLET, DELAYED RELEASE ORAL at 06:03

## 2019-07-11 RX ADMIN — CARBIDOPA AND LEVODOPA 1 TABLET(S): 25; 100 TABLET ORAL at 08:23

## 2019-07-11 RX ADMIN — CARVEDILOL PHOSPHATE 12.5 MILLIGRAM(S): 80 CAPSULE, EXTENDED RELEASE ORAL at 17:33

## 2019-07-11 RX ADMIN — Medication 100 MILLIGRAM(S): at 12:54

## 2019-07-11 RX ADMIN — LEVETIRACETAM 500 MILLIGRAM(S): 250 TABLET, FILM COATED ORAL at 06:03

## 2019-07-11 RX ADMIN — ENTACAPONE 200 MILLIGRAM(S): 200 TABLET, FILM COATED ORAL at 08:23

## 2019-07-11 RX ADMIN — ATORVASTATIN CALCIUM 40 MILLIGRAM(S): 80 TABLET, FILM COATED ORAL at 21:36

## 2019-07-11 RX ADMIN — CARBIDOPA AND LEVODOPA 1 TABLET(S): 25; 100 TABLET ORAL at 21:36

## 2019-07-11 RX ADMIN — ENTACAPONE 200 MILLIGRAM(S): 200 TABLET, FILM COATED ORAL at 12:54

## 2019-07-11 RX ADMIN — CARBIDOPA AND LEVODOPA 1 TABLET(S): 25; 100 TABLET ORAL at 12:54

## 2019-07-11 RX ADMIN — Medication 200 MILLIGRAM(S): at 12:25

## 2019-07-11 RX ADMIN — ENTACAPONE 200 MILLIGRAM(S): 200 TABLET, FILM COATED ORAL at 17:33

## 2019-07-11 RX ADMIN — Medication 81 MILLIGRAM(S): at 12:54

## 2019-07-11 RX ADMIN — LISINOPRIL 20 MILLIGRAM(S): 2.5 TABLET ORAL at 17:33

## 2019-07-11 RX ADMIN — METFORMIN HYDROCHLORIDE 1000 MILLIGRAM(S): 850 TABLET ORAL at 06:03

## 2019-07-11 RX ADMIN — Medication 5 MILLIGRAM(S): at 21:36

## 2019-07-11 RX ADMIN — LEVETIRACETAM 500 MILLIGRAM(S): 250 TABLET, FILM COATED ORAL at 17:33

## 2019-07-11 RX ADMIN — ENTACAPONE 200 MILLIGRAM(S): 200 TABLET, FILM COATED ORAL at 21:36

## 2019-07-11 RX ADMIN — Medication 100 MILLIGRAM(S): at 06:03

## 2019-07-12 DIAGNOSIS — M94.0 CHONDROCOSTAL JUNCTION SYNDROME [TIETZE]: ICD-10-CM

## 2019-07-12 DIAGNOSIS — I44.4 LEFT ANTERIOR FASCICULAR BLOCK: ICD-10-CM

## 2019-07-12 DIAGNOSIS — Z60.2 PROBLEMS RELATED TO LIVING ALONE: ICD-10-CM

## 2019-07-12 DIAGNOSIS — Z79.84 LONG TERM (CURRENT) USE OF ORAL HYPOGLYCEMIC DRUGS: ICD-10-CM

## 2019-07-12 DIAGNOSIS — I69.320 APHASIA FOLLOWING CEREBRAL INFARCTION: ICD-10-CM

## 2019-07-12 DIAGNOSIS — I69.315 COGNITIVE SOCIAL OR EMOTIONAL DEFICIT FOLLOWING CEREBRAL INFARCTION: ICD-10-CM

## 2019-07-12 DIAGNOSIS — Z88.2 ALLERGY STATUS TO SULFONAMIDES: ICD-10-CM

## 2019-07-12 DIAGNOSIS — M10.9 GOUT, UNSPECIFIED: ICD-10-CM

## 2019-07-12 DIAGNOSIS — G40.909 EPILEPSY, UNSPECIFIED, NOT INTRACTABLE, WITHOUT STATUS EPILEPTICUS: ICD-10-CM

## 2019-07-12 DIAGNOSIS — F01.50 VASCULAR DEMENTIA WITHOUT BEHAVIORAL DISTURBANCE: ICD-10-CM

## 2019-07-12 DIAGNOSIS — I10 ESSENTIAL (PRIMARY) HYPERTENSION: ICD-10-CM

## 2019-07-12 DIAGNOSIS — Z82.49 FAMILY HISTORY OF ISCHEMIC HEART DISEASE AND OTHER DISEASES OF THE CIRCULATORY SYSTEM: ICD-10-CM

## 2019-07-12 DIAGNOSIS — R26.9 UNSPECIFIED ABNORMALITIES OF GAIT AND MOBILITY: ICD-10-CM

## 2019-07-12 DIAGNOSIS — I16.0 HYPERTENSIVE URGENCY: ICD-10-CM

## 2019-07-12 DIAGNOSIS — G20 PARKINSON'S DISEASE: ICD-10-CM

## 2019-07-12 DIAGNOSIS — Z79.02 LONG TERM (CURRENT) USE OF ANTITHROMBOTICS/ANTIPLATELETS: ICD-10-CM

## 2019-07-12 DIAGNOSIS — Z88.0 ALLERGY STATUS TO PENICILLIN: ICD-10-CM

## 2019-07-12 DIAGNOSIS — Z79.82 LONG TERM (CURRENT) USE OF ASPIRIN: ICD-10-CM

## 2019-07-12 DIAGNOSIS — Z88.1 ALLERGY STATUS TO OTHER ANTIBIOTIC AGENTS STATUS: ICD-10-CM

## 2019-07-12 DIAGNOSIS — E11.9 TYPE 2 DIABETES MELLITUS WITHOUT COMPLICATIONS: ICD-10-CM

## 2019-07-12 LAB
GLUCOSE BLDC GLUCOMTR-MCNC: 105 MG/DL — HIGH (ref 70–99)
GLUCOSE BLDC GLUCOMTR-MCNC: 87 MG/DL — SIGNIFICANT CHANGE UP (ref 70–99)
GLUCOSE BLDC GLUCOMTR-MCNC: 93 MG/DL — SIGNIFICANT CHANGE UP (ref 70–99)
GLUCOSE BLDC GLUCOMTR-MCNC: 99 MG/DL — SIGNIFICANT CHANGE UP (ref 70–99)

## 2019-07-12 RX ADMIN — Medication 200 MILLIGRAM(S): at 13:30

## 2019-07-12 RX ADMIN — Medication 100 MILLIGRAM(S): at 21:42

## 2019-07-12 RX ADMIN — CARBIDOPA AND LEVODOPA 1 TABLET(S): 25; 100 TABLET ORAL at 13:30

## 2019-07-12 RX ADMIN — Medication 1 TABLET(S): at 13:28

## 2019-07-12 RX ADMIN — CARVEDILOL PHOSPHATE 12.5 MILLIGRAM(S): 80 CAPSULE, EXTENDED RELEASE ORAL at 18:11

## 2019-07-12 RX ADMIN — METFORMIN HYDROCHLORIDE 1000 MILLIGRAM(S): 850 TABLET ORAL at 08:50

## 2019-07-12 RX ADMIN — ENTACAPONE 200 MILLIGRAM(S): 200 TABLET, FILM COATED ORAL at 21:42

## 2019-07-12 RX ADMIN — ENTACAPONE 200 MILLIGRAM(S): 200 TABLET, FILM COATED ORAL at 18:10

## 2019-07-12 RX ADMIN — RIVASTIGMINE 1 PATCH: 4.6 PATCH, EXTENDED RELEASE TRANSDERMAL at 13:23

## 2019-07-12 RX ADMIN — Medication 100 MILLIGRAM(S): at 05:56

## 2019-07-12 RX ADMIN — PANTOPRAZOLE SODIUM 40 MILLIGRAM(S): 20 TABLET, DELAYED RELEASE ORAL at 05:56

## 2019-07-12 RX ADMIN — RIVASTIGMINE 1 PATCH: 4.6 PATCH, EXTENDED RELEASE TRANSDERMAL at 13:22

## 2019-07-12 RX ADMIN — CARBIDOPA AND LEVODOPA 1 TABLET(S): 25; 100 TABLET ORAL at 18:09

## 2019-07-12 RX ADMIN — ENTACAPONE 200 MILLIGRAM(S): 200 TABLET, FILM COATED ORAL at 12:02

## 2019-07-12 RX ADMIN — ATORVASTATIN CALCIUM 40 MILLIGRAM(S): 80 TABLET, FILM COATED ORAL at 21:42

## 2019-07-12 RX ADMIN — LISINOPRIL 20 MILLIGRAM(S): 2.5 TABLET ORAL at 05:56

## 2019-07-12 RX ADMIN — CARBIDOPA AND LEVODOPA 1 TABLET(S): 25; 100 TABLET ORAL at 21:42

## 2019-07-12 RX ADMIN — METFORMIN HYDROCHLORIDE 1000 MILLIGRAM(S): 850 TABLET ORAL at 18:11

## 2019-07-12 RX ADMIN — ENTACAPONE 200 MILLIGRAM(S): 200 TABLET, FILM COATED ORAL at 13:28

## 2019-07-12 RX ADMIN — LEVETIRACETAM 500 MILLIGRAM(S): 250 TABLET, FILM COATED ORAL at 18:10

## 2019-07-12 RX ADMIN — LEVETIRACETAM 500 MILLIGRAM(S): 250 TABLET, FILM COATED ORAL at 05:56

## 2019-07-12 RX ADMIN — ENOXAPARIN SODIUM 40 MILLIGRAM(S): 100 INJECTION SUBCUTANEOUS at 13:29

## 2019-07-12 RX ADMIN — LISINOPRIL 20 MILLIGRAM(S): 2.5 TABLET ORAL at 18:11

## 2019-07-12 RX ADMIN — Medication 81 MILLIGRAM(S): at 13:30

## 2019-07-12 RX ADMIN — CARVEDILOL PHOSPHATE 12.5 MILLIGRAM(S): 80 CAPSULE, EXTENDED RELEASE ORAL at 05:56

## 2019-07-12 SDOH — SOCIAL STABILITY - SOCIAL INSECURITY: PROBLEMS RELATED TO LIVING ALONE: Z60.2

## 2019-07-13 LAB
GLUCOSE BLDC GLUCOMTR-MCNC: 104 MG/DL — HIGH (ref 70–99)
GLUCOSE BLDC GLUCOMTR-MCNC: 75 MG/DL — SIGNIFICANT CHANGE UP (ref 70–99)
GLUCOSE BLDC GLUCOMTR-MCNC: 81 MG/DL — SIGNIFICANT CHANGE UP (ref 70–99)

## 2019-07-13 RX ADMIN — ATORVASTATIN CALCIUM 40 MILLIGRAM(S): 80 TABLET, FILM COATED ORAL at 21:22

## 2019-07-13 RX ADMIN — ENOXAPARIN SODIUM 40 MILLIGRAM(S): 100 INJECTION SUBCUTANEOUS at 11:52

## 2019-07-13 RX ADMIN — PANTOPRAZOLE SODIUM 40 MILLIGRAM(S): 20 TABLET, DELAYED RELEASE ORAL at 05:52

## 2019-07-13 RX ADMIN — CARBIDOPA AND LEVODOPA 1 TABLET(S): 25; 100 TABLET ORAL at 07:52

## 2019-07-13 RX ADMIN — RIVASTIGMINE 1 PATCH: 4.6 PATCH, EXTENDED RELEASE TRANSDERMAL at 07:54

## 2019-07-13 RX ADMIN — RIVASTIGMINE 1 PATCH: 4.6 PATCH, EXTENDED RELEASE TRANSDERMAL at 12:56

## 2019-07-13 RX ADMIN — CARBIDOPA AND LEVODOPA 1 TABLET(S): 25; 100 TABLET ORAL at 21:22

## 2019-07-13 RX ADMIN — CARVEDILOL PHOSPHATE 12.5 MILLIGRAM(S): 80 CAPSULE, EXTENDED RELEASE ORAL at 05:52

## 2019-07-13 RX ADMIN — Medication 200 MILLIGRAM(S): at 11:56

## 2019-07-13 RX ADMIN — LISINOPRIL 20 MILLIGRAM(S): 2.5 TABLET ORAL at 05:52

## 2019-07-13 RX ADMIN — Medication 100 MILLIGRAM(S): at 05:52

## 2019-07-13 RX ADMIN — Medication 1 TABLET(S): at 11:52

## 2019-07-13 RX ADMIN — ENTACAPONE 200 MILLIGRAM(S): 200 TABLET, FILM COATED ORAL at 21:21

## 2019-07-13 RX ADMIN — ENTACAPONE 200 MILLIGRAM(S): 200 TABLET, FILM COATED ORAL at 16:25

## 2019-07-13 RX ADMIN — ENTACAPONE 200 MILLIGRAM(S): 200 TABLET, FILM COATED ORAL at 07:53

## 2019-07-13 RX ADMIN — LISINOPRIL 20 MILLIGRAM(S): 2.5 TABLET ORAL at 17:24

## 2019-07-13 RX ADMIN — METFORMIN HYDROCHLORIDE 1000 MILLIGRAM(S): 850 TABLET ORAL at 17:24

## 2019-07-13 RX ADMIN — LEVETIRACETAM 500 MILLIGRAM(S): 250 TABLET, FILM COATED ORAL at 17:24

## 2019-07-13 RX ADMIN — RIVASTIGMINE 1 PATCH: 4.6 PATCH, EXTENDED RELEASE TRANSDERMAL at 11:52

## 2019-07-13 RX ADMIN — CARBIDOPA AND LEVODOPA 1 TABLET(S): 25; 100 TABLET ORAL at 16:24

## 2019-07-13 RX ADMIN — Medication 100 MILLIGRAM(S): at 13:31

## 2019-07-13 RX ADMIN — Medication 81 MILLIGRAM(S): at 11:56

## 2019-07-13 RX ADMIN — CARVEDILOL PHOSPHATE 12.5 MILLIGRAM(S): 80 CAPSULE, EXTENDED RELEASE ORAL at 17:24

## 2019-07-13 RX ADMIN — METFORMIN HYDROCHLORIDE 1000 MILLIGRAM(S): 850 TABLET ORAL at 07:53

## 2019-07-13 RX ADMIN — CARBIDOPA AND LEVODOPA 1 TABLET(S): 25; 100 TABLET ORAL at 11:56

## 2019-07-13 RX ADMIN — Medication 100 MILLIGRAM(S): at 21:22

## 2019-07-13 RX ADMIN — ENTACAPONE 200 MILLIGRAM(S): 200 TABLET, FILM COATED ORAL at 11:52

## 2019-07-13 RX ADMIN — LEVETIRACETAM 500 MILLIGRAM(S): 250 TABLET, FILM COATED ORAL at 05:52

## 2019-07-13 RX ADMIN — RIVASTIGMINE 1 PATCH: 4.6 PATCH, EXTENDED RELEASE TRANSDERMAL at 21:22

## 2019-07-14 LAB
GLUCOSE BLDC GLUCOMTR-MCNC: 103 MG/DL — HIGH (ref 70–99)
GLUCOSE BLDC GLUCOMTR-MCNC: 114 MG/DL — HIGH (ref 70–99)
GLUCOSE BLDC GLUCOMTR-MCNC: 86 MG/DL — SIGNIFICANT CHANGE UP (ref 70–99)
GLUCOSE BLDC GLUCOMTR-MCNC: 87 MG/DL — SIGNIFICANT CHANGE UP (ref 70–99)

## 2019-07-14 RX ADMIN — LEVETIRACETAM 500 MILLIGRAM(S): 250 TABLET, FILM COATED ORAL at 06:15

## 2019-07-14 RX ADMIN — CARBIDOPA AND LEVODOPA 1 TABLET(S): 25; 100 TABLET ORAL at 20:06

## 2019-07-14 RX ADMIN — ENTACAPONE 200 MILLIGRAM(S): 200 TABLET, FILM COATED ORAL at 12:28

## 2019-07-14 RX ADMIN — METFORMIN HYDROCHLORIDE 1000 MILLIGRAM(S): 850 TABLET ORAL at 06:15

## 2019-07-14 RX ADMIN — RIVASTIGMINE 1 PATCH: 4.6 PATCH, EXTENDED RELEASE TRANSDERMAL at 12:29

## 2019-07-14 RX ADMIN — LEVETIRACETAM 500 MILLIGRAM(S): 250 TABLET, FILM COATED ORAL at 17:32

## 2019-07-14 RX ADMIN — ENOXAPARIN SODIUM 40 MILLIGRAM(S): 100 INJECTION SUBCUTANEOUS at 12:29

## 2019-07-14 RX ADMIN — METFORMIN HYDROCHLORIDE 1000 MILLIGRAM(S): 850 TABLET ORAL at 17:32

## 2019-07-14 RX ADMIN — ENTACAPONE 200 MILLIGRAM(S): 200 TABLET, FILM COATED ORAL at 08:07

## 2019-07-14 RX ADMIN — CARBIDOPA AND LEVODOPA 1 TABLET(S): 25; 100 TABLET ORAL at 16:08

## 2019-07-14 RX ADMIN — Medication 100 MILLIGRAM(S): at 21:20

## 2019-07-14 RX ADMIN — Medication 1 TABLET(S): at 12:28

## 2019-07-14 RX ADMIN — LISINOPRIL 20 MILLIGRAM(S): 2.5 TABLET ORAL at 17:32

## 2019-07-14 RX ADMIN — RIVASTIGMINE 1 PATCH: 4.6 PATCH, EXTENDED RELEASE TRANSDERMAL at 12:30

## 2019-07-14 RX ADMIN — Medication 100 MILLIGRAM(S): at 06:15

## 2019-07-14 RX ADMIN — ENTACAPONE 200 MILLIGRAM(S): 200 TABLET, FILM COATED ORAL at 20:06

## 2019-07-14 RX ADMIN — ENTACAPONE 200 MILLIGRAM(S): 200 TABLET, FILM COATED ORAL at 16:08

## 2019-07-14 RX ADMIN — LISINOPRIL 20 MILLIGRAM(S): 2.5 TABLET ORAL at 06:16

## 2019-07-14 RX ADMIN — CARVEDILOL PHOSPHATE 12.5 MILLIGRAM(S): 80 CAPSULE, EXTENDED RELEASE ORAL at 06:16

## 2019-07-14 RX ADMIN — ATORVASTATIN CALCIUM 40 MILLIGRAM(S): 80 TABLET, FILM COATED ORAL at 21:20

## 2019-07-14 RX ADMIN — Medication 200 MILLIGRAM(S): at 12:28

## 2019-07-14 RX ADMIN — CARBIDOPA AND LEVODOPA 1 TABLET(S): 25; 100 TABLET ORAL at 08:07

## 2019-07-14 RX ADMIN — CARVEDILOL PHOSPHATE 12.5 MILLIGRAM(S): 80 CAPSULE, EXTENDED RELEASE ORAL at 17:32

## 2019-07-14 RX ADMIN — PANTOPRAZOLE SODIUM 40 MILLIGRAM(S): 20 TABLET, DELAYED RELEASE ORAL at 08:07

## 2019-07-14 RX ADMIN — Medication 81 MILLIGRAM(S): at 12:28

## 2019-07-14 RX ADMIN — RIVASTIGMINE 1 PATCH: 4.6 PATCH, EXTENDED RELEASE TRANSDERMAL at 20:07

## 2019-07-14 RX ADMIN — CARBIDOPA AND LEVODOPA 1 TABLET(S): 25; 100 TABLET ORAL at 12:28

## 2019-07-14 RX ADMIN — Medication 100 MILLIGRAM(S): at 13:08

## 2019-07-15 LAB
ANION GAP SERPL CALC-SCNC: 12 MMOL/L — SIGNIFICANT CHANGE UP (ref 7–14)
BUN SERPL-MCNC: 14 MG/DL — SIGNIFICANT CHANGE UP (ref 10–20)
CALCIUM SERPL-MCNC: 9.2 MG/DL — SIGNIFICANT CHANGE UP (ref 8.5–10.1)
CHLORIDE SERPL-SCNC: 108 MMOL/L — SIGNIFICANT CHANGE UP (ref 98–110)
CO2 SERPL-SCNC: 24 MMOL/L — SIGNIFICANT CHANGE UP (ref 17–32)
CREAT SERPL-MCNC: 1.1 MG/DL — SIGNIFICANT CHANGE UP (ref 0.7–1.5)
GLUCOSE BLDC GLUCOMTR-MCNC: 111 MG/DL — HIGH (ref 70–99)
GLUCOSE BLDC GLUCOMTR-MCNC: 76 MG/DL — SIGNIFICANT CHANGE UP (ref 70–99)
GLUCOSE BLDC GLUCOMTR-MCNC: 83 MG/DL — SIGNIFICANT CHANGE UP (ref 70–99)
GLUCOSE SERPL-MCNC: 86 MG/DL — SIGNIFICANT CHANGE UP (ref 70–99)
HCT VFR BLD CALC: 44.2 % — SIGNIFICANT CHANGE UP (ref 42–52)
HGB BLD-MCNC: 15.1 G/DL — SIGNIFICANT CHANGE UP (ref 14–18)
MCHC RBC-ENTMCNC: 32.9 PG — HIGH (ref 27–31)
MCHC RBC-ENTMCNC: 34.2 G/DL — SIGNIFICANT CHANGE UP (ref 32–37)
MCV RBC AUTO: 96.3 FL — HIGH (ref 80–94)
NRBC # BLD: 0 /100 WBCS — SIGNIFICANT CHANGE UP (ref 0–0)
PLATELET # BLD AUTO: 213 K/UL — SIGNIFICANT CHANGE UP (ref 130–400)
POTASSIUM SERPL-MCNC: 4.8 MMOL/L — SIGNIFICANT CHANGE UP (ref 3.5–5)
POTASSIUM SERPL-SCNC: 4.8 MMOL/L — SIGNIFICANT CHANGE UP (ref 3.5–5)
RBC # BLD: 4.59 M/UL — LOW (ref 4.7–6.1)
RBC # FLD: 11.9 % — SIGNIFICANT CHANGE UP (ref 11.5–14.5)
SODIUM SERPL-SCNC: 144 MMOL/L — SIGNIFICANT CHANGE UP (ref 135–146)
WBC # BLD: 9.1 K/UL — SIGNIFICANT CHANGE UP (ref 4.8–10.8)
WBC # FLD AUTO: 9.1 K/UL — SIGNIFICANT CHANGE UP (ref 4.8–10.8)

## 2019-07-15 RX ORDER — LISINOPRIL 2.5 MG/1
10 TABLET ORAL DAILY
Refills: 0 | Status: DISCONTINUED | OUTPATIENT
Start: 2019-07-15 | End: 2019-07-19

## 2019-07-15 RX ADMIN — Medication 81 MILLIGRAM(S): at 12:45

## 2019-07-15 RX ADMIN — CARBIDOPA AND LEVODOPA 1 TABLET(S): 25; 100 TABLET ORAL at 22:04

## 2019-07-15 RX ADMIN — ENOXAPARIN SODIUM 40 MILLIGRAM(S): 100 INJECTION SUBCUTANEOUS at 12:46

## 2019-07-15 RX ADMIN — RIVASTIGMINE 1 PATCH: 4.6 PATCH, EXTENDED RELEASE TRANSDERMAL at 06:07

## 2019-07-15 RX ADMIN — ENTACAPONE 200 MILLIGRAM(S): 200 TABLET, FILM COATED ORAL at 17:48

## 2019-07-15 RX ADMIN — CARBIDOPA AND LEVODOPA 1 TABLET(S): 25; 100 TABLET ORAL at 12:45

## 2019-07-15 RX ADMIN — METFORMIN HYDROCHLORIDE 1000 MILLIGRAM(S): 850 TABLET ORAL at 05:55

## 2019-07-15 RX ADMIN — ATORVASTATIN CALCIUM 40 MILLIGRAM(S): 80 TABLET, FILM COATED ORAL at 22:04

## 2019-07-15 RX ADMIN — ENTACAPONE 200 MILLIGRAM(S): 200 TABLET, FILM COATED ORAL at 12:45

## 2019-07-15 RX ADMIN — LEVETIRACETAM 500 MILLIGRAM(S): 250 TABLET, FILM COATED ORAL at 17:48

## 2019-07-15 RX ADMIN — Medication 1 TABLET(S): at 12:45

## 2019-07-15 RX ADMIN — CARVEDILOL PHOSPHATE 12.5 MILLIGRAM(S): 80 CAPSULE, EXTENDED RELEASE ORAL at 17:48

## 2019-07-15 RX ADMIN — CARBIDOPA AND LEVODOPA 1 TABLET(S): 25; 100 TABLET ORAL at 08:01

## 2019-07-15 RX ADMIN — CARBIDOPA AND LEVODOPA 1 TABLET(S): 25; 100 TABLET ORAL at 17:48

## 2019-07-15 RX ADMIN — Medication 100 MILLIGRAM(S): at 22:04

## 2019-07-15 RX ADMIN — PANTOPRAZOLE SODIUM 40 MILLIGRAM(S): 20 TABLET, DELAYED RELEASE ORAL at 06:07

## 2019-07-15 RX ADMIN — METFORMIN HYDROCHLORIDE 1000 MILLIGRAM(S): 850 TABLET ORAL at 17:48

## 2019-07-15 RX ADMIN — ENTACAPONE 200 MILLIGRAM(S): 200 TABLET, FILM COATED ORAL at 08:01

## 2019-07-15 RX ADMIN — Medication 200 MILLIGRAM(S): at 12:47

## 2019-07-15 RX ADMIN — LISINOPRIL 20 MILLIGRAM(S): 2.5 TABLET ORAL at 05:55

## 2019-07-15 RX ADMIN — Medication 100 MILLIGRAM(S): at 12:45

## 2019-07-15 RX ADMIN — CARVEDILOL PHOSPHATE 12.5 MILLIGRAM(S): 80 CAPSULE, EXTENDED RELEASE ORAL at 05:55

## 2019-07-15 RX ADMIN — RIVASTIGMINE 1 PATCH: 4.6 PATCH, EXTENDED RELEASE TRANSDERMAL at 12:45

## 2019-07-15 RX ADMIN — LEVETIRACETAM 500 MILLIGRAM(S): 250 TABLET, FILM COATED ORAL at 05:55

## 2019-07-15 RX ADMIN — Medication 100 MILLIGRAM(S): at 05:55

## 2019-07-15 RX ADMIN — ENTACAPONE 200 MILLIGRAM(S): 200 TABLET, FILM COATED ORAL at 22:04

## 2019-07-15 RX ADMIN — RIVASTIGMINE 1 PATCH: 4.6 PATCH, EXTENDED RELEASE TRANSDERMAL at 12:52

## 2019-07-16 LAB
GLUCOSE BLDC GLUCOMTR-MCNC: 109 MG/DL — HIGH (ref 70–99)
GLUCOSE BLDC GLUCOMTR-MCNC: 125 MG/DL — HIGH (ref 70–99)
GLUCOSE BLDC GLUCOMTR-MCNC: 90 MG/DL — SIGNIFICANT CHANGE UP (ref 70–99)

## 2019-07-16 RX ORDER — DONEPEZIL HYDROCHLORIDE 10 MG/1
5 TABLET, FILM COATED ORAL AT BEDTIME
Refills: 0 | Status: DISCONTINUED | OUTPATIENT
Start: 2019-07-16 | End: 2019-07-17

## 2019-07-16 RX ORDER — ACETAMINOPHEN 500 MG
650 TABLET ORAL
Refills: 0 | Status: DISCONTINUED | OUTPATIENT
Start: 2019-07-16 | End: 2019-07-19

## 2019-07-16 RX ADMIN — RIVASTIGMINE 1 PATCH: 4.6 PATCH, EXTENDED RELEASE TRANSDERMAL at 05:39

## 2019-07-16 RX ADMIN — ENTACAPONE 200 MILLIGRAM(S): 200 TABLET, FILM COATED ORAL at 22:33

## 2019-07-16 RX ADMIN — Medication 650 MILLIGRAM(S): at 14:10

## 2019-07-16 RX ADMIN — LEVETIRACETAM 500 MILLIGRAM(S): 250 TABLET, FILM COATED ORAL at 17:16

## 2019-07-16 RX ADMIN — ENTACAPONE 200 MILLIGRAM(S): 200 TABLET, FILM COATED ORAL at 17:16

## 2019-07-16 RX ADMIN — Medication 81 MILLIGRAM(S): at 12:54

## 2019-07-16 RX ADMIN — Medication 200 MILLIGRAM(S): at 12:53

## 2019-07-16 RX ADMIN — Medication 650 MILLIGRAM(S): at 12:53

## 2019-07-16 RX ADMIN — CARBIDOPA AND LEVODOPA 1 TABLET(S): 25; 100 TABLET ORAL at 07:50

## 2019-07-16 RX ADMIN — LEVETIRACETAM 500 MILLIGRAM(S): 250 TABLET, FILM COATED ORAL at 05:43

## 2019-07-16 RX ADMIN — PANTOPRAZOLE SODIUM 40 MILLIGRAM(S): 20 TABLET, DELAYED RELEASE ORAL at 05:43

## 2019-07-16 RX ADMIN — RIVASTIGMINE 1 PATCH: 4.6 PATCH, EXTENDED RELEASE TRANSDERMAL at 17:10

## 2019-07-16 RX ADMIN — CARBIDOPA AND LEVODOPA 1 TABLET(S): 25; 100 TABLET ORAL at 22:33

## 2019-07-16 RX ADMIN — METFORMIN HYDROCHLORIDE 1000 MILLIGRAM(S): 850 TABLET ORAL at 07:50

## 2019-07-16 RX ADMIN — Medication 1 TABLET(S): at 12:55

## 2019-07-16 RX ADMIN — ENTACAPONE 200 MILLIGRAM(S): 200 TABLET, FILM COATED ORAL at 07:50

## 2019-07-16 RX ADMIN — DONEPEZIL HYDROCHLORIDE 5 MILLIGRAM(S): 10 TABLET, FILM COATED ORAL at 23:59

## 2019-07-16 RX ADMIN — Medication 100 MILLIGRAM(S): at 12:57

## 2019-07-16 RX ADMIN — RIVASTIGMINE 1 PATCH: 4.6 PATCH, EXTENDED RELEASE TRANSDERMAL at 12:55

## 2019-07-16 RX ADMIN — CARBIDOPA AND LEVODOPA 1 TABLET(S): 25; 100 TABLET ORAL at 12:54

## 2019-07-16 RX ADMIN — Medication 100 MILLIGRAM(S): at 22:33

## 2019-07-16 RX ADMIN — METFORMIN HYDROCHLORIDE 1000 MILLIGRAM(S): 850 TABLET ORAL at 17:16

## 2019-07-16 RX ADMIN — CARVEDILOL PHOSPHATE 12.5 MILLIGRAM(S): 80 CAPSULE, EXTENDED RELEASE ORAL at 17:16

## 2019-07-16 RX ADMIN — CARBIDOPA AND LEVODOPA 1 TABLET(S): 25; 100 TABLET ORAL at 17:16

## 2019-07-16 RX ADMIN — ENOXAPARIN SODIUM 40 MILLIGRAM(S): 100 INJECTION SUBCUTANEOUS at 12:53

## 2019-07-16 RX ADMIN — ATORVASTATIN CALCIUM 40 MILLIGRAM(S): 80 TABLET, FILM COATED ORAL at 22:33

## 2019-07-16 RX ADMIN — LISINOPRIL 10 MILLIGRAM(S): 2.5 TABLET ORAL at 05:43

## 2019-07-16 RX ADMIN — CARVEDILOL PHOSPHATE 12.5 MILLIGRAM(S): 80 CAPSULE, EXTENDED RELEASE ORAL at 05:43

## 2019-07-16 RX ADMIN — ENTACAPONE 200 MILLIGRAM(S): 200 TABLET, FILM COATED ORAL at 12:54

## 2019-07-17 LAB
GLUCOSE BLDC GLUCOMTR-MCNC: 121 MG/DL — HIGH (ref 70–99)
GLUCOSE BLDC GLUCOMTR-MCNC: 122 MG/DL — HIGH (ref 70–99)
GLUCOSE BLDC GLUCOMTR-MCNC: 91 MG/DL — SIGNIFICANT CHANGE UP (ref 70–99)
GLUCOSE BLDC GLUCOMTR-MCNC: 97 MG/DL — SIGNIFICANT CHANGE UP (ref 70–99)

## 2019-07-17 RX ADMIN — ENTACAPONE 200 MILLIGRAM(S): 200 TABLET, FILM COATED ORAL at 21:16

## 2019-07-17 RX ADMIN — Medication 20 MILLIGRAM(S): at 17:56

## 2019-07-17 RX ADMIN — Medication 200 MILLIGRAM(S): at 12:43

## 2019-07-17 RX ADMIN — LEVETIRACETAM 500 MILLIGRAM(S): 250 TABLET, FILM COATED ORAL at 17:42

## 2019-07-17 RX ADMIN — ENTACAPONE 200 MILLIGRAM(S): 200 TABLET, FILM COATED ORAL at 12:43

## 2019-07-17 RX ADMIN — CARBIDOPA AND LEVODOPA 1 TABLET(S): 25; 100 TABLET ORAL at 17:42

## 2019-07-17 RX ADMIN — CARBIDOPA AND LEVODOPA 1 TABLET(S): 25; 100 TABLET ORAL at 08:21

## 2019-07-17 RX ADMIN — METFORMIN HYDROCHLORIDE 1000 MILLIGRAM(S): 850 TABLET ORAL at 17:42

## 2019-07-17 RX ADMIN — Medication 1 TABLET(S): at 12:43

## 2019-07-17 RX ADMIN — CARVEDILOL PHOSPHATE 12.5 MILLIGRAM(S): 80 CAPSULE, EXTENDED RELEASE ORAL at 05:53

## 2019-07-17 RX ADMIN — RIVASTIGMINE 1 PATCH: 4.6 PATCH, EXTENDED RELEASE TRANSDERMAL at 12:44

## 2019-07-17 RX ADMIN — Medication 81 MILLIGRAM(S): at 12:43

## 2019-07-17 RX ADMIN — LISINOPRIL 10 MILLIGRAM(S): 2.5 TABLET ORAL at 05:53

## 2019-07-17 RX ADMIN — Medication 20 MILLIGRAM(S): at 12:44

## 2019-07-17 RX ADMIN — Medication 650 MILLIGRAM(S): at 08:22

## 2019-07-17 RX ADMIN — RIVASTIGMINE 1 PATCH: 4.6 PATCH, EXTENDED RELEASE TRANSDERMAL at 08:22

## 2019-07-17 RX ADMIN — METFORMIN HYDROCHLORIDE 1000 MILLIGRAM(S): 850 TABLET ORAL at 08:21

## 2019-07-17 RX ADMIN — ATORVASTATIN CALCIUM 40 MILLIGRAM(S): 80 TABLET, FILM COATED ORAL at 21:16

## 2019-07-17 RX ADMIN — Medication 100 MILLIGRAM(S): at 05:53

## 2019-07-17 RX ADMIN — CARBIDOPA AND LEVODOPA 1 TABLET(S): 25; 100 TABLET ORAL at 12:43

## 2019-07-17 RX ADMIN — ENTACAPONE 200 MILLIGRAM(S): 200 TABLET, FILM COATED ORAL at 08:21

## 2019-07-17 RX ADMIN — Medication 100 MILLIGRAM(S): at 21:16

## 2019-07-17 RX ADMIN — LEVETIRACETAM 500 MILLIGRAM(S): 250 TABLET, FILM COATED ORAL at 05:53

## 2019-07-17 RX ADMIN — Medication 650 MILLIGRAM(S): at 09:35

## 2019-07-17 RX ADMIN — ENOXAPARIN SODIUM 40 MILLIGRAM(S): 100 INJECTION SUBCUTANEOUS at 12:42

## 2019-07-17 RX ADMIN — CARVEDILOL PHOSPHATE 12.5 MILLIGRAM(S): 80 CAPSULE, EXTENDED RELEASE ORAL at 17:42

## 2019-07-17 RX ADMIN — Medication 650 MILLIGRAM(S): at 21:50

## 2019-07-17 RX ADMIN — PANTOPRAZOLE SODIUM 40 MILLIGRAM(S): 20 TABLET, DELAYED RELEASE ORAL at 05:53

## 2019-07-17 RX ADMIN — Medication 100 MILLIGRAM(S): at 12:43

## 2019-07-17 RX ADMIN — Medication 650 MILLIGRAM(S): at 21:19

## 2019-07-17 RX ADMIN — RIVASTIGMINE 1 PATCH: 4.6 PATCH, EXTENDED RELEASE TRANSDERMAL at 19:30

## 2019-07-17 RX ADMIN — CARBIDOPA AND LEVODOPA 1 TABLET(S): 25; 100 TABLET ORAL at 21:16

## 2019-07-17 RX ADMIN — ENTACAPONE 200 MILLIGRAM(S): 200 TABLET, FILM COATED ORAL at 17:42

## 2019-07-18 LAB
GLUCOSE BLDC GLUCOMTR-MCNC: 125 MG/DL — HIGH (ref 70–99)
GLUCOSE BLDC GLUCOMTR-MCNC: 126 MG/DL — HIGH (ref 70–99)
GLUCOSE BLDC GLUCOMTR-MCNC: 137 MG/DL — HIGH (ref 70–99)
GLUCOSE BLDC GLUCOMTR-MCNC: 137 MG/DL — HIGH (ref 70–99)

## 2019-07-18 RX ADMIN — METFORMIN HYDROCHLORIDE 1000 MILLIGRAM(S): 850 TABLET ORAL at 17:03

## 2019-07-18 RX ADMIN — METFORMIN HYDROCHLORIDE 1000 MILLIGRAM(S): 850 TABLET ORAL at 05:51

## 2019-07-18 RX ADMIN — Medication 100 MILLIGRAM(S): at 05:49

## 2019-07-18 RX ADMIN — Medication 650 MILLIGRAM(S): at 08:30

## 2019-07-18 RX ADMIN — Medication 100 MILLIGRAM(S): at 21:31

## 2019-07-18 RX ADMIN — CARVEDILOL PHOSPHATE 12.5 MILLIGRAM(S): 80 CAPSULE, EXTENDED RELEASE ORAL at 17:03

## 2019-07-18 RX ADMIN — PANTOPRAZOLE SODIUM 40 MILLIGRAM(S): 20 TABLET, DELAYED RELEASE ORAL at 05:49

## 2019-07-18 RX ADMIN — LEVETIRACETAM 500 MILLIGRAM(S): 250 TABLET, FILM COATED ORAL at 05:49

## 2019-07-18 RX ADMIN — Medication 1 TABLET(S): at 13:10

## 2019-07-18 RX ADMIN — ENOXAPARIN SODIUM 40 MILLIGRAM(S): 100 INJECTION SUBCUTANEOUS at 13:11

## 2019-07-18 RX ADMIN — RIVASTIGMINE 1 PATCH: 4.6 PATCH, EXTENDED RELEASE TRANSDERMAL at 17:04

## 2019-07-18 RX ADMIN — RIVASTIGMINE 1 PATCH: 4.6 PATCH, EXTENDED RELEASE TRANSDERMAL at 13:10

## 2019-07-18 RX ADMIN — Medication 20 MILLIGRAM(S): at 05:51

## 2019-07-18 RX ADMIN — CARBIDOPA AND LEVODOPA 1 TABLET(S): 25; 100 TABLET ORAL at 07:31

## 2019-07-18 RX ADMIN — ENTACAPONE 200 MILLIGRAM(S): 200 TABLET, FILM COATED ORAL at 13:11

## 2019-07-18 RX ADMIN — ENTACAPONE 200 MILLIGRAM(S): 200 TABLET, FILM COATED ORAL at 21:31

## 2019-07-18 RX ADMIN — Medication 650 MILLIGRAM(S): at 07:31

## 2019-07-18 RX ADMIN — Medication 100 MILLIGRAM(S): at 13:11

## 2019-07-18 RX ADMIN — Medication 81 MILLIGRAM(S): at 13:10

## 2019-07-18 RX ADMIN — CARVEDILOL PHOSPHATE 12.5 MILLIGRAM(S): 80 CAPSULE, EXTENDED RELEASE ORAL at 05:49

## 2019-07-18 RX ADMIN — CARBIDOPA AND LEVODOPA 1 TABLET(S): 25; 100 TABLET ORAL at 21:31

## 2019-07-18 RX ADMIN — CARBIDOPA AND LEVODOPA 1 TABLET(S): 25; 100 TABLET ORAL at 17:03

## 2019-07-18 RX ADMIN — Medication 200 MILLIGRAM(S): at 13:09

## 2019-07-18 RX ADMIN — CARBIDOPA AND LEVODOPA 1 TABLET(S): 25; 100 TABLET ORAL at 13:08

## 2019-07-18 RX ADMIN — ENTACAPONE 200 MILLIGRAM(S): 200 TABLET, FILM COATED ORAL at 07:31

## 2019-07-18 RX ADMIN — LEVETIRACETAM 500 MILLIGRAM(S): 250 TABLET, FILM COATED ORAL at 17:03

## 2019-07-18 RX ADMIN — ATORVASTATIN CALCIUM 40 MILLIGRAM(S): 80 TABLET, FILM COATED ORAL at 21:31

## 2019-07-18 RX ADMIN — ENTACAPONE 200 MILLIGRAM(S): 200 TABLET, FILM COATED ORAL at 17:03

## 2019-07-18 RX ADMIN — LISINOPRIL 10 MILLIGRAM(S): 2.5 TABLET ORAL at 05:49

## 2019-07-19 ENCOUNTER — TRANSCRIPTION ENCOUNTER (OUTPATIENT)
Age: 69
End: 2019-07-19

## 2019-07-19 DIAGNOSIS — M10.9 GOUT, UNSPECIFIED: ICD-10-CM

## 2019-07-19 LAB
GLUCOSE BLDC GLUCOMTR-MCNC: 144 MG/DL — HIGH (ref 70–99)
GLUCOSE BLDC GLUCOMTR-MCNC: 154 MG/DL — HIGH (ref 70–99)
GLUCOSE BLDC GLUCOMTR-MCNC: 90 MG/DL — SIGNIFICANT CHANGE UP (ref 70–99)

## 2019-07-19 RX ORDER — ATORVASTATIN CALCIUM 80 MG/1
1 TABLET, FILM COATED ORAL
Qty: 0 | Refills: 0 | DISCHARGE
Start: 2019-07-19

## 2019-07-19 RX ORDER — LANOLIN ALCOHOL/MO/W.PET/CERES
1 CREAM (GRAM) TOPICAL
Qty: 0 | Refills: 0 | DISCHARGE
Start: 2019-07-19

## 2019-07-19 RX ORDER — ASPIRIN/CALCIUM CARB/MAGNESIUM 324 MG
1 TABLET ORAL
Qty: 0 | Refills: 0 | DISCHARGE
Start: 2019-07-19

## 2019-07-19 RX ORDER — CARVEDILOL PHOSPHATE 80 MG/1
1 CAPSULE, EXTENDED RELEASE ORAL
Qty: 0 | Refills: 0 | DISCHARGE
Start: 2019-07-19

## 2019-07-19 RX ORDER — METFORMIN HYDROCHLORIDE 850 MG/1
1 TABLET ORAL
Qty: 0 | Refills: 0 | DISCHARGE
Start: 2019-07-19

## 2019-07-19 RX ORDER — LEVETIRACETAM 250 MG/1
1 TABLET, FILM COATED ORAL
Qty: 0 | Refills: 0 | DISCHARGE
Start: 2019-07-19

## 2019-07-19 RX ORDER — ACETAMINOPHEN 500 MG
2 TABLET ORAL
Qty: 0 | Refills: 0 | DISCHARGE
Start: 2019-07-19

## 2019-07-19 RX ORDER — CARBIDOPA AND LEVODOPA 25; 100 MG/1; MG/1
1 TABLET ORAL
Qty: 0 | Refills: 0 | DISCHARGE
Start: 2019-07-19

## 2019-07-19 RX ORDER — LISINOPRIL 2.5 MG/1
1 TABLET ORAL
Qty: 0 | Refills: 0 | DISCHARGE
Start: 2019-07-19

## 2019-07-19 RX ORDER — RIVASTIGMINE 4.6 MG/24H
1 PATCH, EXTENDED RELEASE TRANSDERMAL
Qty: 0 | Refills: 0 | DISCHARGE
Start: 2019-07-19

## 2019-07-19 RX ORDER — ALLOPURINOL 300 MG
2 TABLET ORAL
Qty: 0 | Refills: 0 | DISCHARGE
Start: 2019-07-19

## 2019-07-19 RX ORDER — DOCUSATE SODIUM 100 MG
1 CAPSULE ORAL
Qty: 0 | Refills: 0 | DISCHARGE
Start: 2019-07-19

## 2019-07-19 RX ORDER — ENTACAPONE 200 MG/1
1 TABLET, FILM COATED ORAL
Qty: 0 | Refills: 0 | DISCHARGE
Start: 2019-07-19

## 2019-07-19 RX ADMIN — CARVEDILOL PHOSPHATE 12.5 MILLIGRAM(S): 80 CAPSULE, EXTENDED RELEASE ORAL at 05:54

## 2019-07-19 RX ADMIN — LISINOPRIL 10 MILLIGRAM(S): 2.5 TABLET ORAL at 05:54

## 2019-07-19 RX ADMIN — CARBIDOPA AND LEVODOPA 1 TABLET(S): 25; 100 TABLET ORAL at 07:51

## 2019-07-19 RX ADMIN — RIVASTIGMINE 1 PATCH: 4.6 PATCH, EXTENDED RELEASE TRANSDERMAL at 12:21

## 2019-07-19 RX ADMIN — Medication 650 MILLIGRAM(S): at 07:51

## 2019-07-19 RX ADMIN — Medication 100 MILLIGRAM(S): at 05:54

## 2019-07-19 RX ADMIN — CARBIDOPA AND LEVODOPA 1 TABLET(S): 25; 100 TABLET ORAL at 12:21

## 2019-07-19 RX ADMIN — Medication 81 MILLIGRAM(S): at 12:21

## 2019-07-19 RX ADMIN — Medication 200 MILLIGRAM(S): at 12:21

## 2019-07-19 RX ADMIN — Medication 1 TABLET(S): at 12:21

## 2019-07-19 RX ADMIN — ENTACAPONE 200 MILLIGRAM(S): 200 TABLET, FILM COATED ORAL at 12:21

## 2019-07-19 RX ADMIN — Medication 100 MILLIGRAM(S): at 12:21

## 2019-07-19 RX ADMIN — PANTOPRAZOLE SODIUM 40 MILLIGRAM(S): 20 TABLET, DELAYED RELEASE ORAL at 06:14

## 2019-07-19 RX ADMIN — ENTACAPONE 200 MILLIGRAM(S): 200 TABLET, FILM COATED ORAL at 15:39

## 2019-07-19 RX ADMIN — RIVASTIGMINE 1 PATCH: 4.6 PATCH, EXTENDED RELEASE TRANSDERMAL at 07:52

## 2019-07-19 RX ADMIN — Medication 650 MILLIGRAM(S): at 10:08

## 2019-07-19 RX ADMIN — CARBIDOPA AND LEVODOPA 1 TABLET(S): 25; 100 TABLET ORAL at 15:39

## 2019-07-19 RX ADMIN — ENTACAPONE 200 MILLIGRAM(S): 200 TABLET, FILM COATED ORAL at 07:51

## 2019-07-19 RX ADMIN — METFORMIN HYDROCHLORIDE 1000 MILLIGRAM(S): 850 TABLET ORAL at 05:54

## 2019-07-19 RX ADMIN — Medication 20 MILLIGRAM(S): at 05:54

## 2019-07-19 RX ADMIN — LEVETIRACETAM 500 MILLIGRAM(S): 250 TABLET, FILM COATED ORAL at 05:54

## 2019-07-19 RX ADMIN — RIVASTIGMINE 1 PATCH: 4.6 PATCH, EXTENDED RELEASE TRANSDERMAL at 12:20

## 2019-07-19 RX ADMIN — ENOXAPARIN SODIUM 40 MILLIGRAM(S): 100 INJECTION SUBCUTANEOUS at 12:21

## 2019-07-19 NOTE — DISCHARGE NOTE PROVIDER - NSDCCPCAREPLAN_GEN_ALL_CORE_FT
PRINCIPAL DISCHARGE DIAGNOSIS  Diagnosis: Seizure with onset of stroke  Assessment and Plan of Treatment: -Continue Keppra in outpatient setting  -Follow up with Neuro        SECONDARY DISCHARGE DIAGNOSES  Diagnosis: Gout  Assessment and Plan of Treatment: continue home meds as indicated      Diagnosis: Dementia  Assessment and Plan of Treatment: -Continue Excelon and Sinemet; follow up with outpatient provider PRINCIPAL DISCHARGE DIAGNOSIS  Diagnosis: Seizure with onset of stroke  Assessment and Plan of Treatment: -Continue Keppra in outpatient setting  -Follow up with Neuro        SECONDARY DISCHARGE DIAGNOSES  Diagnosis: Tendonitis, Achilles, left  Assessment and Plan of Treatment: pain control ice as needed  s/p course of steroids    Diagnosis: Gout  Assessment and Plan of Treatment: continue home meds as indicated      Diagnosis: Dementia  Assessment and Plan of Treatment: -Continue Excelon and Sinemet; follow up with outpatient provider

## 2019-07-19 NOTE — DISCHARGE NOTE PROVIDER - CARE PROVIDER_API CALL
Rosa Lynn)  Neurology  [address] [place] [address]  Phone: [phone]  Fax: [phone]  Follow Up Time: 1 week Roas Lynn)  Neurology  [address] [place] [address]  Phone: [phone]  Fax: [phone]  Follow Up Time: 1 week    Wendy Mclean)  Medicine  [address] [place] [id number]  Phone: [phone]  Fax: [phone]  Follow Up Time: 2 weeks

## 2019-07-19 NOTE — DISCHARGE NOTE PROVIDER - CARE PROVIDERS DIRECT ADDRESSES
[email or web address]

## 2019-07-19 NOTE — DISCHARGE NOTE NURSING/CASE MANAGEMENT/SOCIAL WORK - NSDCDPATPORTLINK_GEN_ALL_CORE
You can access the [email or web address] Patient Portal, offered by [facility], by registering with the following website: [email or web address]

## 2019-07-19 NOTE — DISCHARGE NOTE PROVIDER - HOSPITAL COURSE
HPI:  68 y/o M w/ past medical history of CVA (1998, and 2016 with residual expressive aphasia and cognitive deficits), Parkinson's disease,  hypertension, DM II, Gout brought in for change in mental status. Patient had acute change in mental status after most likely post ectal state after a seizure ( patient came with a bruised tongue), patient was not responding on the first 2 days, he was AAO*1 but improved significantly after, he returned to his baseline. CT head and MRI negative for acute pathology, EEG showed  presence of: generalized slowing, b12, tsh levels were normal, UA and cxr negative, patient was given keppra and continued on Carbidopa-Levodopa and entacapone home medication for parkinson. patient had also bilateral chest pain, ekg showed NSR with left anterior fascicular block, troponin negative likely muscular in origin.    Prior functional status: prior to admission patient was independent in his ADLS, ambulation (with device). Patient required assistance with shoping and cooking    bp 143//87 T 97.2  hr 69 rr 18    General: NAD well groomed, well developed, alert and orient x2, patient was unsure of the year but did know that he was in the hospital.      HEENT: atratumatic, normocephalic, EOMI, PERRLA, conjunctiva and sclera clear neck supple, anterior tongue bruise noted    Chest/Lung: CTAB    Cardio: RRR nromal s1 and s2    ambdomen soft NTND, BS+    Extremities: no calf tenderness no edema in BL lower extremities    Neuro: Cranial nerves 2-12 intact    ROM: within functional limits in all extremities    Motor: intact    Sensation: intact to light touch    Gait: deferred due to patient confusion    Cognition: had difficulty with complex commands, Unable to find words and explain why he was in  the hospital.     Hospital Course: The patient was admitted to the acute inpatient rehab unit presenting with a decline in functional status. The patient participated in three hours of multidisciplinary therapy 5-6 days per week. Patient's mental status improved from admission prior to admission to rehab however As patient's presentation was concerning for seizure he was conitnued on Keppra and his Parkinson's medications. Neurology was also consulted as patient had signs of cognitive impairment likely due to dementia and his Aricept was discontinue and started on Excelon. On 7/15 patient complained of left sided heal pain and was empirically for treated for gout flare with a short course of steroids. The patient was continued on all home medications or equivalent alternatives as deemed appropriate. The patient received prophylactic anticoagulation medication and was monitored closely with no complications. During the stay on the inpatient unit, the patient showed as much progress as had been anticipated and was cleared for discharge by a multidisciplinary team.    Discharge functional status: Patient ambulates 150 feet with rolling with a slow reciprocal lexi. Cognitive linguistic deficit and moderate to severe expressive language deficits.     Discharge disposition: Skilled Nursing Facility 69 year old  M w/ past medical history of CVA (1998 and 2016 with residual expressive aphasia and cognitive deficits), Parkinson's disease,  hypertension, DM II, Gout brought in for change in mental status. Patient had acute change in mental status after most likely post ectal state after a seizure ( patient came with a bruised tongue), patient was not responding on the first 2 days, he was AAO*1 but improved significantly after, he returned to his baseline. CT head and MRI negative for acute pathology, EEG showed  presence of: generalized slowing, b12, tsh levels were normal, UA and cxr negative, patient was given keppra and continued on Carbidopa-Levodopa and entacapone home medication for parkinson. patient had also bilateral chest pain, ekg showed NSR with left anterior fascicular block, troponin negative likely muscular in origin.    Prior functional status: prior to admission patient was independent in his ADLS, ambulation (with device). Patient required assistance with shoping and cooking    //87 T 97.2  hr 69 rr 18    General: NAD well groomed, well developed, alert and orient x2, patient was unsure of the year but did know that he was in the hospital.      HEENT: atratumatic, normocephalic, EOMI, PERRLA, conjunctiva and sclera clear neck supple, anterior tongue bruise noted    Chest/Lung: CTAB    Cardio: RRR nromal s1 and s2    ambdomen soft NTND, BS+    Extremities: no calf tenderness no edema in BL lower extremities    Neuro: Cranial nerves 2-12 intact    ROM: within functional limits in all extremities    Motor: intact    Sensation: intact to light touch    Gait: deferred due to patient confusion    Cognition: had difficulty with complex commands, Unable to find words and explain why he was in  the hospital.     Hospital Course: The patient was admitted to the acute inpatient rehab unit presenting with a decline in functional status. The patient participated in three hours of multidisciplinary therapy 5-6 days per week. Patient's mental status improved from admission prior to admission to rehab however As patient's presentation was concerning for seizure he was conitnued on Keppra and his Parkinson's medications. Neurology was also consulted for medical management of dementia and patient was started on Excelon. Patient had persistent left heal pain, physical exam consistent with achilles tendinopathy and patient was treated with course of steroids. The patient was continued on all home medications or equivalent alternatives as deemed appropriate. The patient received prophylactic anticoagulation medication and was monitored closely with no complications. During the stay on the inpatient unit, the patient showed as much progress as had been anticipated and was cleared for discharge by a multidisciplinary team.    Discharge functional status: Patient ambulates 150 feet with rolling with a slow reciprocal lexi. Cognitive linguistic deficit and moderate to severe expressive language deficits.     Discharge disposition: Skilled Nursing Facility

## 2019-07-19 NOTE — DISCHARGE NOTE PROVIDER - PROVIDER TOKENS
PROVIDER:[TOKEN:[[id number]],FOLLOWUP:[1 week]] PROVIDER:[TOKEN:[[id number]],FOLLOWUP:[1 week]],PROVIDER:[TOKEN:[[id number]],FOLLOWUP:[2 weeks]]

## 2019-07-20 ENCOUNTER — OUTPATIENT (OUTPATIENT)
Dept: OUTPATIENT SERVICES | Facility: HOSPITAL | Age: 69
LOS: 1 days | Discharge: HOME | End: 2019-07-20

## 2019-07-20 DIAGNOSIS — R79.9 ABNORMAL FINDING OF BLOOD CHEMISTRY, UNSPECIFIED: ICD-10-CM

## 2019-07-24 DIAGNOSIS — I69.354 HEMIPLEGIA AND HEMIPARESIS FOLLOWING CEREBRAL INFARCTION AFFECTING LEFT NON-DOMINANT SIDE: ICD-10-CM

## 2019-07-24 DIAGNOSIS — G20 PARKINSON'S DISEASE: ICD-10-CM

## 2019-07-24 DIAGNOSIS — Z88.2 ALLERGY STATUS TO SULFONAMIDES: ICD-10-CM

## 2019-07-24 DIAGNOSIS — E11.9 TYPE 2 DIABETES MELLITUS WITHOUT COMPLICATIONS: ICD-10-CM

## 2019-07-24 DIAGNOSIS — L84 CORNS AND CALLOSITIES: ICD-10-CM

## 2019-07-24 DIAGNOSIS — Z79.82 LONG TERM (CURRENT) USE OF ASPIRIN: ICD-10-CM

## 2019-07-24 DIAGNOSIS — F02.80 DEMENTIA IN OTHER DISEASES CLASSIFIED ELSEWHERE, UNSPECIFIED SEVERITY, WITHOUT BEHAVIORAL DISTURBANCE, PSYCHOTIC DISTURBANCE, MOOD DISTURBANCE, AND ANXIETY: ICD-10-CM

## 2019-07-24 DIAGNOSIS — I69.320 APHASIA FOLLOWING CEREBRAL INFARCTION: ICD-10-CM

## 2019-07-24 DIAGNOSIS — I10 ESSENTIAL (PRIMARY) HYPERTENSION: ICD-10-CM

## 2019-07-24 DIAGNOSIS — I69.318 OTHER SYMPTOMS AND SIGNS INVOLVING COGNITIVE FUNCTIONS FOLLOWING CEREBRAL INFARCTION: ICD-10-CM

## 2019-07-24 DIAGNOSIS — F01.50 VASCULAR DEMENTIA WITHOUT BEHAVIORAL DISTURBANCE: ICD-10-CM

## 2019-07-24 DIAGNOSIS — F43.21 ADJUSTMENT DISORDER WITH DEPRESSED MOOD: ICD-10-CM

## 2019-07-24 DIAGNOSIS — L60.9 NAIL DISORDER, UNSPECIFIED: ICD-10-CM

## 2019-07-24 DIAGNOSIS — M79.672 PAIN IN LEFT FOOT: ICD-10-CM

## 2019-07-24 DIAGNOSIS — G40.909 EPILEPSY, UNSPECIFIED, NOT INTRACTABLE, WITHOUT STATUS EPILEPTICUS: ICD-10-CM

## 2019-07-24 DIAGNOSIS — M10.09 IDIOPATHIC GOUT, MULTIPLE SITES: ICD-10-CM

## 2019-07-24 DIAGNOSIS — M79.602 PAIN IN LEFT ARM: ICD-10-CM

## 2019-07-24 DIAGNOSIS — M79.81 NONTRAUMATIC HEMATOMA OF SOFT TISSUE: ICD-10-CM

## 2019-07-24 DIAGNOSIS — Z88.0 ALLERGY STATUS TO PENICILLIN: ICD-10-CM

## 2019-07-24 DIAGNOSIS — E78.5 HYPERLIPIDEMIA, UNSPECIFIED: ICD-10-CM

## 2019-10-17 ENCOUNTER — APPOINTMENT (OUTPATIENT)
Dept: NEUROLOGY | Facility: CLINIC | Age: 69
End: 2019-10-17
Payer: MEDICARE

## 2019-10-17 VITALS
WEIGHT: 161 LBS | HEART RATE: 75 BPM | SYSTOLIC BLOOD PRESSURE: 158 MMHG | HEIGHT: 68 IN | BODY MASS INDEX: 24.4 KG/M2 | TEMPERATURE: 98 F | DIASTOLIC BLOOD PRESSURE: 92 MMHG | OXYGEN SATURATION: 96 %

## 2019-10-17 DIAGNOSIS — R56.9 UNSPECIFIED CONVULSIONS: ICD-10-CM

## 2019-10-17 DIAGNOSIS — G20 PARKINSON'S DISEASE: ICD-10-CM

## 2019-10-17 PROCEDURE — 99213 OFFICE O/P EST LOW 20 MIN: CPT

## 2019-10-17 RX ORDER — ENTACAPONE 200 MG/1
200 TABLET, FILM COATED ORAL 4 TIMES DAILY
Qty: 120 | Refills: 5 | Status: ACTIVE | COMMUNITY
Start: 2019-10-17 | End: 1900-01-01

## 2019-10-17 RX ORDER — RIVASTIGMINE 9.5 MG/24H
9.5 PATCH, EXTENDED RELEASE TRANSDERMAL DAILY
Qty: 30 | Refills: 5 | Status: ACTIVE | COMMUNITY
Start: 2019-10-17 | End: 1900-01-01

## 2019-10-17 RX ORDER — CARBIDOPA AND LEVODOPA 25; 100 MG/1; MG/1
25-100 TABLET ORAL 4 TIMES DAILY
Qty: 120 | Refills: 5 | Status: ACTIVE | COMMUNITY
Start: 2019-10-17 | End: 1900-01-01

## 2019-10-17 RX ORDER — LEVETIRACETAM 500 MG/1
500 TABLET, FILM COATED ORAL
Qty: 60 | Refills: 6 | Status: ACTIVE | COMMUNITY
Start: 2019-10-17 | End: 1900-01-01

## 2019-10-17 RX ORDER — CLONAZEPAM 0.5 MG/1
0.5 TABLET ORAL AT BEDTIME
Qty: 15 | Refills: 0 | Status: ACTIVE | COMMUNITY
Start: 2019-10-17 | End: 1900-01-01

## 2019-10-17 NOTE — ASSESSMENT
[FreeTextEntry1] : 68 yo man with IPD\par patient is well controlled\par cont sinemet 25/100 qid with comtan 200 mg qid\par 2. seizure d/o: new onset\par cont keppra 500 q 12\par 3. hx of cva: cont asa and statin. f/u with pmd for control of risk factors\par 4. insomnia: will start with klonopin .25 mg qhs\par f/u in 4 mos

## 2019-10-17 NOTE — PHYSICAL EXAM
[FreeTextEntry1] : Neurological Exam: \par a/o x 3, bradyphrenic, expressive aphasia is baseline from old cva, masked facies\par Cranial nerves: Pupils equally round and reactive to light, visual fields full, no nystagmus, extraocular muscles intact, V1 through V3 intact bilaterally and symmetric, face symmetric, hearing intact to finger rub, palate elevation symmetric, tongue was midline.\par Motor:  MRC grading 5/5 b/l UE/LE.   strength 5/5.  Normal tone and bulk.  No abnormal movements.  \par Sensation: Intact to light touch, proprioception, and pinprick. no tremor\par Coordination: No dysmetria on finger-to-nose and heel-to-shin.  No dysdiadokinesia.\par Reflexes: 2+ in bilateral UE/LE, downgoing toes bilaterally. (-) Neal.\par Gait: Narrow based and shuffling, quick stride 3 step turn\par \par

## 2019-10-17 NOTE — HISTORY OF PRESENT ILLNESS
[FreeTextEntry1] : patient is a 68 yo man who presents for follow up today for his PD. Currently on sinemet 25/100 qid and comtan 200 mg qid\par patient reports that he was in hospital in july. pt dx'd with sz after episodes of confusion, loc and tongue biting\par on keppra 500 q 12\par patient's only new complaint is trouble sleeping and occ vertigo\par mri done at the time of hospitalization and no acute cva was seen. pt w/ extensive wm changes

## 2020-01-08 ENCOUNTER — EMERGENCY (EMERGENCY)
Facility: HOSPITAL | Age: 70
LOS: 0 days | Discharge: HOME | End: 2020-01-08
Attending: EMERGENCY MEDICINE | Admitting: INTERNAL MEDICINE
Payer: MEDICARE

## 2020-01-08 VITALS
HEART RATE: 64 BPM | DIASTOLIC BLOOD PRESSURE: 87 MMHG | OXYGEN SATURATION: 98 % | TEMPERATURE: 97 F | RESPIRATION RATE: 16 BRPM | SYSTOLIC BLOOD PRESSURE: 141 MMHG

## 2020-01-08 VITALS
TEMPERATURE: 98 F | DIASTOLIC BLOOD PRESSURE: 78 MMHG | SYSTOLIC BLOOD PRESSURE: 136 MMHG | RESPIRATION RATE: 18 BRPM | HEART RATE: 65 BPM | OXYGEN SATURATION: 99 %

## 2020-01-08 LAB
ALBUMIN SERPL ELPH-MCNC: 3.6 G/DL — SIGNIFICANT CHANGE UP (ref 3.5–5.2)
ALP SERPL-CCNC: 74 U/L — SIGNIFICANT CHANGE UP (ref 30–115)
ALT FLD-CCNC: 27 U/L — SIGNIFICANT CHANGE UP (ref 0–41)
ANION GAP SERPL CALC-SCNC: 12 MMOL/L — SIGNIFICANT CHANGE UP (ref 7–14)
AST SERPL-CCNC: 22 U/L — SIGNIFICANT CHANGE UP (ref 0–41)
BASOPHILS # BLD AUTO: 0.02 K/UL — SIGNIFICANT CHANGE UP (ref 0–0.2)
BASOPHILS NFR BLD AUTO: 0.3 % — SIGNIFICANT CHANGE UP (ref 0–1)
BILIRUB SERPL-MCNC: 0.5 MG/DL — SIGNIFICANT CHANGE UP (ref 0.2–1.2)
BUN SERPL-MCNC: 9 MG/DL — LOW (ref 10–20)
CALCIUM SERPL-MCNC: 8.8 MG/DL — SIGNIFICANT CHANGE UP (ref 8.5–10.1)
CHLORIDE SERPL-SCNC: 106 MMOL/L — SIGNIFICANT CHANGE UP (ref 98–110)
CO2 SERPL-SCNC: 27 MMOL/L — SIGNIFICANT CHANGE UP (ref 17–32)
CREAT SERPL-MCNC: 0.9 MG/DL — SIGNIFICANT CHANGE UP (ref 0.7–1.5)
EOSINOPHIL # BLD AUTO: 0.09 K/UL — SIGNIFICANT CHANGE UP (ref 0–0.7)
EOSINOPHIL NFR BLD AUTO: 1.3 % — SIGNIFICANT CHANGE UP (ref 0–8)
GLUCOSE SERPL-MCNC: 142 MG/DL — HIGH (ref 70–99)
HCT VFR BLD CALC: 45.1 % — SIGNIFICANT CHANGE UP (ref 42–52)
HGB BLD-MCNC: 15.1 G/DL — SIGNIFICANT CHANGE UP (ref 14–18)
IMM GRANULOCYTES NFR BLD AUTO: 0.3 % — SIGNIFICANT CHANGE UP (ref 0.1–0.3)
LYMPHOCYTES # BLD AUTO: 2.01 K/UL — SIGNIFICANT CHANGE UP (ref 1.2–3.4)
LYMPHOCYTES # BLD AUTO: 29.9 % — SIGNIFICANT CHANGE UP (ref 20.5–51.1)
MCHC RBC-ENTMCNC: 32.8 PG — HIGH (ref 27–31)
MCHC RBC-ENTMCNC: 33.5 G/DL — SIGNIFICANT CHANGE UP (ref 32–37)
MCV RBC AUTO: 98 FL — HIGH (ref 80–94)
MONOCYTES # BLD AUTO: 0.47 K/UL — SIGNIFICANT CHANGE UP (ref 0.1–0.6)
MONOCYTES NFR BLD AUTO: 7 % — SIGNIFICANT CHANGE UP (ref 1.7–9.3)
NEUTROPHILS # BLD AUTO: 4.11 K/UL — SIGNIFICANT CHANGE UP (ref 1.4–6.5)
NEUTROPHILS NFR BLD AUTO: 61.2 % — SIGNIFICANT CHANGE UP (ref 42.2–75.2)
NRBC # BLD: 0 /100 WBCS — SIGNIFICANT CHANGE UP (ref 0–0)
PLATELET # BLD AUTO: 136 K/UL — SIGNIFICANT CHANGE UP (ref 130–400)
POTASSIUM SERPL-MCNC: 4.5 MMOL/L — SIGNIFICANT CHANGE UP (ref 3.5–5)
POTASSIUM SERPL-SCNC: 4.5 MMOL/L — SIGNIFICANT CHANGE UP (ref 3.5–5)
PROT SERPL-MCNC: 5.6 G/DL — LOW (ref 6–8)
RBC # BLD: 4.6 M/UL — LOW (ref 4.7–6.1)
RBC # FLD: 12.1 % — SIGNIFICANT CHANGE UP (ref 11.5–14.5)
SODIUM SERPL-SCNC: 145 MMOL/L — SIGNIFICANT CHANGE UP (ref 135–146)
TROPONIN T SERPL-MCNC: <0.01 NG/ML — SIGNIFICANT CHANGE UP
WBC # BLD: 6.72 K/UL — SIGNIFICANT CHANGE UP (ref 4.8–10.8)
WBC # FLD AUTO: 6.72 K/UL — SIGNIFICANT CHANGE UP (ref 4.8–10.8)

## 2020-01-08 PROCEDURE — 93010 ELECTROCARDIOGRAM REPORT: CPT

## 2020-01-08 PROCEDURE — 70450 CT HEAD/BRAIN W/O DYE: CPT | Mod: 26

## 2020-01-08 PROCEDURE — 99284 EMERGENCY DEPT VISIT MOD MDM: CPT

## 2020-01-08 PROCEDURE — 72125 CT NECK SPINE W/O DYE: CPT | Mod: 26

## 2020-01-08 RX ORDER — MECLIZINE HCL 12.5 MG
1 TABLET ORAL
Qty: 21 | Refills: 0
Start: 2020-01-08 | End: 2020-01-14

## 2020-01-08 RX ORDER — MECLIZINE HCL 12.5 MG
50 TABLET ORAL ONCE
Refills: 0 | Status: COMPLETED | OUTPATIENT
Start: 2020-01-08 | End: 2020-01-08

## 2020-01-08 RX ORDER — SODIUM CHLORIDE 9 MG/ML
1000 INJECTION INTRAMUSCULAR; INTRAVENOUS; SUBCUTANEOUS
Refills: 0 | Status: DISCONTINUED | OUTPATIENT
Start: 2020-01-08 | End: 2020-01-08

## 2020-01-08 RX ORDER — METOCLOPRAMIDE HCL 10 MG
10 TABLET ORAL ONCE
Refills: 0 | Status: COMPLETED | OUTPATIENT
Start: 2020-01-08 | End: 2020-01-08

## 2020-01-08 RX ORDER — DIPHENHYDRAMINE HCL 50 MG
25 CAPSULE ORAL ONCE
Refills: 0 | Status: COMPLETED | OUTPATIENT
Start: 2020-01-08 | End: 2020-01-08

## 2020-01-08 RX ADMIN — SODIUM CHLORIDE 125 MILLILITER(S): 9 INJECTION INTRAMUSCULAR; INTRAVENOUS; SUBCUTANEOUS at 13:05

## 2020-01-08 RX ADMIN — Medication 10 MILLIGRAM(S): at 14:53

## 2020-01-08 RX ADMIN — Medication 104 MILLIGRAM(S): at 13:06

## 2020-01-08 RX ADMIN — Medication 25 MILLIGRAM(S): at 13:05

## 2020-01-08 RX ADMIN — Medication 50 MILLIGRAM(S): at 13:05

## 2020-01-08 RX ADMIN — SODIUM CHLORIDE 1000 MILLILITER(S): 9 INJECTION INTRAMUSCULAR; INTRAVENOUS; SUBCUTANEOUS at 14:53

## 2020-01-08 NOTE — ED ADULT NURSE REASSESSMENT NOTE - NS ED NURSE REASSESS COMMENT FT1
pt is DC back to [place]. pt has no questions or concerns at this time. Transport is ordered by the ED . IV access to be removed when transport arrive to the ED.  Safety maintained, Will continue to monitor

## 2020-01-08 NOTE — ED PROVIDER NOTE - OBJECTIVE STATEMENT
70 yo m with pmh of parkinsons disease, cva, dm, htn, vertigo, dementia, presents with c/o increased dizziness.  pt says he has daily vertigo, but woke up this morning with increased dizziness.  pt reports feels like the room is moving.  pt admits had head trauma last night.  pt says he got up to go to bathroom and on way back to bed, tripped by the window and knocked his head onto the wall and collapse on floor.  pt says he laid down for about 30 min and then was able to get back up and lay down in bed.  pt denies loc.  pt says hit the left side of his head.  pt says in the past was given rx for vertigo but has not taken in long time.  pt denies asa or ac use. pt is moderately able to provide history.

## 2020-01-08 NOTE — ED ADULT NURSE NOTE - OBJECTIVE STATEMENT
pt presents with dizziness, reports a fall last night. pt with hx of vertigo stating "felt dizzy, I fell and hit my head against the wall". pt denies loc. pt denies headache or change in vision at this time. No neuro deficit noted in the ED. pt c/o dizziness in the ED. pt denies any body pain post the fall.

## 2020-01-08 NOTE — ED ADULT NURSE REASSESSMENT NOTE - NS ED NURSE REASSESS COMMENT FT1
Transport arrived to  the pt via wheelchair. IV access was removed prior to pt leaving the ED. Safety maintained during transport.

## 2020-01-08 NOTE — ED ADULT TRIAGE NOTE - CHIEF COMPLAINT QUOTE
Pt states at 12am he got dizzy and fell . +head injury. Denies LOC. Pt takes aspirin. GCS 15. C/o headache.

## 2020-01-08 NOTE — ED PROVIDER NOTE - PHYSICAL EXAMINATION
VITAL SIGNS: I have reviewed nursing notes and confirm.  CONSTITUTIONAL: Well-developed; well-nourished; in no acute distress.  SKIN: Skin exam is warm and dry, no acute rash.  HEAD: Normocephalic; atraumatic.  EYES: PERRL, EOM intact; conjunctiva and sclera clear.  ENT: No nasal discharge; airway clear. TMs clear.  NECK: Supple; non tender.  CARD: S1, S2 normal; no murmurs, gallops, or rubs. Regular rate and rhythm.  RESP: No wheezes, rales or rhonchi.  ABD: Normal bowel sounds; soft; non-distended; non-tender;  EXT: Normal ROM. No clubbing, cyanosis or edema.  NEURO: aox3, cn2-12 grossly normal, 5/5 strength all ext, sensation intact, finger to nose normal,   PSYCH: Cooperative, appropriate.

## 2020-01-08 NOTE — ED ADULT NURSE NOTE - DRUG PRE-SCREENING (DAST -1)
How Severe Is It?: mild Is This A New Presentation, Or A Follow-Up?: Follow Up Accutane Statement Selected

## 2020-01-08 NOTE — ED PROVIDER NOTE - NS ED ROS FT
Review of Systems:  	•	CONSTITUTIONAL - no fever, no diaphoresis, no weight change  	•	SKIN - no rash  	•	HEMATOLOGIC - no bleeding, no bruising  	•	EYES - no eye pain, no blurred vision  	•	ENT - no change in hearing, no pain  	•	RESPIRATORY - no shortness of breath, no cough  	•	CARDIAC - no chest pain, no palpitations  	•	GI - no abd pain, no nausea, no vomiting, no diarrhea, no constipation, no bleeding  	•	 - no dysuria, no hematuria, no flank pain, no urinary retention  	•	MUSCULOSKELETAL - no joint paint, no swelling, no redness  	•	NEUROLOGIC - no weakness, no headache, no paresthesias, + dizziness  All other systems negative, unless specified in HPI

## 2020-01-08 NOTE — ED PROVIDER NOTE - PATIENT PORTAL LINK FT
You can access the FollowMyHealth Patient Portal offered by [facility] by registering at the following website: [email or web address]. By joining [facility]’s FollowMyHealth portal, you will also be able to view your health information using other applications (apps) compatible with our system.

## 2020-01-08 NOTE — ED ADULT NURSE NOTE - INTERVENTIONS DEFINITIONS
Monitor for mental status changes and reorient to person, place, and time/Review medications for side effects contributing to fall risk/Stretcher in lowest position, wheels locked, appropriate side rails in place/Instruct patient to call for assistance/Reinforce activity limits and safety measures with patient and family/Physically safe environment: no spills, clutter or unnecessary equipment

## 2020-01-08 NOTE — ED PROVIDER NOTE - CLINICAL SUMMARY MEDICAL DECISION MAKING FREE TEXT BOX
Pt with increased dizziness after hitting his head 12 hrs pta, pt has baseline vertigo, after symptomatic treatment, pt says he is back to his baseline instead of the increased dizziness.  CT and labs reassuring, spoke to dr. gentile, will dc back to [place] with meclizine

## 2020-01-08 NOTE — ED ADULT NURSE NOTE - CHPI ED NUR SYMPTOMS NEG
no nausea/no change in level of consciousness/no numbness/no vomiting/no confusion/no weakness/no fever/no loss of consciousness/no blurred vision

## 2020-01-08 NOTE — ED PROVIDER NOTE - CARE PROVIDER_API CALL
Wendy Mclean (MD)  Medicine  [address] [place] [id number]  Phone: [phone]  Fax: [phone]  Follow Up Time: 4-6 Days

## 2020-01-09 ENCOUNTER — INPATIENT (INPATIENT)
Facility: HOSPITAL | Age: 70
LOS: 3 days | Discharge: NURSING, REHAB OR LONG-TERM CARE | End: 2020-01-13
Attending: INTERNAL MEDICINE | Admitting: INTERNAL MEDICINE
Payer: MEDICARE

## 2020-01-09 VITALS
OXYGEN SATURATION: 98 % | RESPIRATION RATE: 18 BRPM | DIASTOLIC BLOOD PRESSURE: 94 MMHG | SYSTOLIC BLOOD PRESSURE: 160 MMHG | HEART RATE: 100 BPM | HEIGHT: 68 IN | TEMPERATURE: 99 F

## 2020-01-09 LAB — GLUCOSE BLDC GLUCOMTR-MCNC: 93 MG/DL — SIGNIFICANT CHANGE UP (ref 70–99)

## 2020-01-09 PROCEDURE — 73562 X-RAY EXAM OF KNEE 3: CPT | Mod: 26,LT

## 2020-01-09 PROCEDURE — 99285 EMERGENCY DEPT VISIT HI MDM: CPT | Mod: GC

## 2020-01-09 RX ORDER — TRAZODONE HCL 50 MG
100 TABLET ORAL AT BEDTIME
Refills: 0 | Status: DISCONTINUED | OUTPATIENT
Start: 2020-01-09 | End: 2020-01-13

## 2020-01-09 RX ORDER — ACETAMINOPHEN 500 MG
975 TABLET ORAL ONCE
Refills: 0 | Status: COMPLETED | OUTPATIENT
Start: 2020-01-09 | End: 2020-01-09

## 2020-01-09 RX ORDER — ENTACAPONE 200 MG/1
200 TABLET, FILM COATED ORAL
Refills: 0 | Status: DISCONTINUED | OUTPATIENT
Start: 2020-01-09 | End: 2020-01-13

## 2020-01-09 RX ORDER — ALLOPURINOL 300 MG
200 TABLET ORAL DAILY
Refills: 0 | Status: DISCONTINUED | OUTPATIENT
Start: 2020-01-09 | End: 2020-01-13

## 2020-01-09 RX ORDER — CLONAZEPAM 1 MG
0.5 TABLET ORAL AT BEDTIME
Refills: 0 | Status: DISCONTINUED | OUTPATIENT
Start: 2020-01-09 | End: 2020-01-13

## 2020-01-09 RX ORDER — LISINOPRIL 2.5 MG/1
20 TABLET ORAL DAILY
Refills: 0 | Status: DISCONTINUED | OUTPATIENT
Start: 2020-01-09 | End: 2020-01-10

## 2020-01-09 RX ORDER — LANOLIN ALCOHOL/MO/W.PET/CERES
5 CREAM (GRAM) TOPICAL AT BEDTIME
Refills: 0 | Status: DISCONTINUED | OUTPATIENT
Start: 2020-01-09 | End: 2020-01-13

## 2020-01-09 RX ORDER — ATORVASTATIN CALCIUM 80 MG/1
10 TABLET, FILM COATED ORAL AT BEDTIME
Refills: 0 | Status: DISCONTINUED | OUTPATIENT
Start: 2020-01-09 | End: 2020-01-13

## 2020-01-09 RX ORDER — CARBIDOPA AND LEVODOPA 25; 100 MG/1; MG/1
1 TABLET ORAL
Refills: 0 | Status: DISCONTINUED | OUTPATIENT
Start: 2020-01-09 | End: 2020-01-10

## 2020-01-09 RX ORDER — ASPIRIN/CALCIUM CARB/MAGNESIUM 324 MG
81 TABLET ORAL DAILY
Refills: 0 | Status: DISCONTINUED | OUTPATIENT
Start: 2020-01-09 | End: 2020-01-13

## 2020-01-09 RX ORDER — CARVEDILOL PHOSPHATE 80 MG/1
12.5 CAPSULE, EXTENDED RELEASE ORAL EVERY 12 HOURS
Refills: 0 | Status: DISCONTINUED | OUTPATIENT
Start: 2020-01-09 | End: 2020-01-13

## 2020-01-09 RX ORDER — ENOXAPARIN SODIUM 100 MG/ML
40 INJECTION SUBCUTANEOUS DAILY
Refills: 0 | Status: DISCONTINUED | OUTPATIENT
Start: 2020-01-09 | End: 2020-01-13

## 2020-01-09 RX ORDER — LEVETIRACETAM 250 MG/1
500 TABLET, FILM COATED ORAL
Refills: 0 | Status: DISCONTINUED | OUTPATIENT
Start: 2020-01-09 | End: 2020-01-13

## 2020-01-09 RX ADMIN — ENTACAPONE 200 MILLIGRAM(S): 200 TABLET, FILM COATED ORAL at 23:02

## 2020-01-09 RX ADMIN — Medication 0.5 MILLIGRAM(S): at 23:05

## 2020-01-09 RX ADMIN — Medication 975 MILLIGRAM(S): at 17:45

## 2020-01-09 RX ADMIN — Medication 5 MILLIGRAM(S): at 23:02

## 2020-01-09 RX ADMIN — CARBIDOPA AND LEVODOPA 1 TABLET(S): 25; 100 TABLET ORAL at 23:03

## 2020-01-09 RX ADMIN — Medication 100 MILLIGRAM(S): at 23:02

## 2020-01-09 RX ADMIN — ATORVASTATIN CALCIUM 10 MILLIGRAM(S): 80 TABLET, FILM COATED ORAL at 23:01

## 2020-01-09 RX ADMIN — Medication 975 MILLIGRAM(S): at 19:20

## 2020-01-09 NOTE — ED PROVIDER NOTE - PROGRESS NOTE DETAILS
Admitted for neuro eval and rehab per PCP's request. VS stable. XR negative for fx of left knee. Given Tylenol for mild muscular neck pain/HA.

## 2020-01-09 NOTE — H&P ADULT - NSHPLABSRESULTS_GEN_ALL_CORE
Vitals:    Vital Signs Last 24 Hrs  T(C): 37 (09 Jan 2020 16:09), Max: 37 (09 Jan 2020 16:09)  T(F): 98.6 (09 Jan 2020 16:09), Max: 98.6 (09 Jan 2020 16:09)  HR: 100 (09 Jan 2020 16:09) (100 - 100)  BP: 160/94 (09 Jan 2020 16:09) (160/94 - 160/94)  BP(mean): --  RR: 18 (09 Jan 2020 16:09) (18 - 18)  SpO2: 98% (09 Jan 2020 16:09) (98% - 98%)    Labs:     01-08    145  |  106  |  9<L>  ----------------------------<  142<H>  4.5   |  27  |  0.9    Ca    8.8      08 Jan 2020 14:10    TPro  5.6<L>  /  Alb  3.6  /  TBili  0.5  /  DBili  x   /  AST  22  /  ALT  27  /  AlkPhos  74  01-08                          15.1   6.72  )-----------( 136      ( 08 Jan 2020 14:10 )             45.1     CARDIAC MARKERS ( 08 Jan 2020 14:10 )  x     / <0.01 ng/mL / x     / x     / x          LIVER FUNCTIONS - ( 08 Jan 2020 14:10 )  Alb: 3.6 g/dL / Pro: 5.6 g/dL / ALK PHOS: 74 U/L / ALT: 27 U/L / AST: 22 U/L / GGT: x           RADIOLOGY    < from: CT Cervical Spine No Cont (01.08.20 @ 15:46) >    IMPRESSION:  CT head: No evidence of acute transcortical infarct, acute intracranial hemorrhage or mass effect.    CT cervical spine: No acute fracture or traumatic subluxation.     < end of copied text >

## 2020-01-09 NOTE — PATIENT PROFILE ADULT - NSPROMEDSHERBAL_GEN_A_NUR
[place] INPATIENT ENCOUNTER  PROGRESS NOTE    ADMISSION DATE:  6/9/2018  CONSULTING PHYSICIAN:  Joseph Reardon MD  ATTENDING PHYSICIAN:  Harry Fried MD   CODE STATUS:  Full Resuscitation        ASSESSMENT:  Reese Olmstead is a 77 year old male presenting with melena followed by bright red blood per rectum associated with hypotension suggestive of UGI bleeding. Patient was discharged home on 6/7/18 following admission for GI bleeding and EGD during that admission showed gastritis, multiple gastric varices without active bleeding. Angiography following these findings revealed chronic splenic artery occlusion with hypertrophied left  inferior phrenic artery arising from the celiac artery which reconstitutes the spleen is through prominent submucosal arteries in the gastric fundus. These correspond to the findings on endoscopy and are likely the source of the patient's GI bleed. He was sent home with the plans for outpatient angiography with embolization followed by splenectomy. EGD on 6/10/18 confirmed active bleeding with large amount of blood in gastric lumen. Pt underwent angiography with embolization. No further bleeding or severe abdominal pain to suggest splenic ischemia. The patient indicates understanding of these issues and agrees with the plan.      RECOMMENDATIONS:  -- NGT to LIS  -- d/c with octreotide gtt  -- Continue Protonix to 40 mg IV b.i.d.  -- Serial H&H and transfuse as needed  -- Will sign off and call with questions     CHIEF COMPLAINT:  Melena and bright red blood per rectum associated with hypotension    Interval history:  No black stool, bright red blood per rectum or coffee ground emesis since interventional radiology procedure  Implants of abdominal distention but patient is unhappy with the NG tube  No fevers chills  No significant abdominal pain    HISTORIES:    ALLERGIES:  No Known Allergies  Past Medical History:   Diagnosis Date   • Acute myocardial infarction of anterior wall  (CMS/[facility]) 05/11/2014    2 stents placed.   needs f/u echo in 2-4 wks    • Acute pyelonephritis 12/13/2015    with sepsis   • Atrial fibrillation (CMS/[facility]) 12/2015   • BENIGN NEOPLASM (Hyperplastic polyp) LG BOWEL 9/26/08    Colonoscopy/Min/recall 9/2013   • Benign neoplasm of colon 5/12/16   • C. difficile colitis 12/2015    treated with Vanco   • Coronary artery disease    • Diarrhea    • Digital mucous cyst 4/22/2012   • Diplopia 11/20/2017   • Diverticulosis of large intestine without diverticulitis 5/12/16   • Elevated MCV 9/20/2016   • Elevated prostate specific antigen (PSA) 1994    PSA of \"7\", undetectable since RRP/Radiation/Chronic Lupron Tx: longer than needed   • end expiration squeak/ ? apnea 1/9/2012   • Essential (primary) hypertension    • Eye discomfort 9/20/2016   • Family history of malignant neoplasm of gastrointestinal tract 9/26/08    Colonoscopy/Min   • Fatigue 12/9/2015   • Fracture     wrist, ankle, fingers, rib, toes   • Gait disturbance; shuffles  6/15/2015   • Gallstones     found on CT    • Gastric varices    • Gastritis 11/22/2017   • GI Bleeding 1990    requiring 26 pts  blood and two surgeries to repair.   • Hemorrhoid 5/12/16   • Hyperlipidemia    • Hypotension 12/2015    admission: new onset afib/ chf    • Impotence of organic origin 9/26/2003   • Internal hemorrhoids 5/2016   • Iron deficiency anemia 12/13/2015   • Laryngopharyngeal reflux (LPR) 1/18/2013   • Left leg pain 4/21/2014   • Malignant neoplasm of prostate (CMS/[facility]) 1994   • Memory loss, short term 4/21/2014    MMSE 23/ 30 4/21/2014     • Mild cognitive impairment    • Nocturnal hypoxemia 4/17/2013   • Obstructive sleep apnea 4/17/2013    has cpap but  doesn't use   • Osteoarthritis knee     Right knee    • OSTEOARTHROS NOS-L/LEG-Knees bilat R Valg, L varus 11/7/2005   • Other abnormal clinical finding 5/10    Stable bilateral calcified pleural plaques: pt DENIES asbestos exposure.    • Personal history of colonic  polyps 9/26/08    Colonoscopy/[facility]   • PMH of 2000    stomach aorta aneurysm  requiring two surgeries to fix.   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   • Biopsy of prostate,needle/punch  1994    At [facility]   • Cardiac surgery      2 stents    • Colonoscopy  10/11/13    needs f/u 10/2106 tubular adenoma found    • Colonoscopy diagnostic  2001   • Colonoscopy remove lesion by snare  9/26/08    Dr. Min w/snare polypectomy/hyperplastic polyp/recall 9/2013   • Colonoscopy remove lesion by snare  5/12/16    Dr Tarango   • Colonoscopy remove lesion hot bx or cautery  10/15/04    Dr. Pako rodriguez/hot bx polypectomy   • Colonoscopy w biopsy  9/26/08    Dr. Min w/bx/   • Colonoscopy w biopsy  5/12/16    Dr Tarango   • Cystourethroscopy  10/01    hematuria   • Cystourethroscopy  3/6/15    Dr. Carlson   • Echo heart resting  05/16/2014    done at [facility]: Lt ventricle nml size w/ mild systolic dysfunction.Apical akinesis present.Mild concentric lt ventricular hypertrophy.mild diastolic dysfunction present.LVEF 45-49%.Rt ventricle nml size w/ nml systolic function. NML wall thickness.Lt atrium mod enlarged based on est volume. No gross mass,thrombus or vegetation.   • Esophagogastroduodenoscopy transoral flex w/bx single or mult  3/06    diarrhea:  duodenum bx: neg path findings.   • Esophagogastroduodenoscopy transoral flex w/bx single or mult  11/22/2017     bx neg.   • Esophagogastroduodenoscopy transoral flex w/bx single or mult  06/04/2018    Large Fundal Varices,  Superficial acute Gastritis - Dr Powell   • Esophagogastroduodenoscopy w/endoscopic us eso stomach duod  06/10/14    Gastric Varices.    • Insert intracoronary stent  05/11/2014    PTCA w/ 3.0 x 24 and a 3.0 x 16 mm Promus PremierDrug-Eluting stent placed to lesion in mid LAD.   • Knee scope,diagnostic  2001    right   • Left heart cath,percutaneous  05/11/2014    performed by Dr Rodriguez @ [facility]:LAD:severe atherosclerosis.Mid LAD:100% stenosis.Circumflex:mod atherosclerosis.RCA:mod atherosclerosis.regional contractile function:anterolateral dyskinesis,apical dyskinesis,diaphragmatic dyskinesis.Ef 45%.hemodynamic assessment:mod to severe systemic HTN.DE stent mid LAD.   • Past surgical history  6/98    AAA repair: leaking:  repair x 2   • Past surgical history  1990    Open cauterization of gastric bleeding ulcer   • Reconstr total shoulder implant  2/9/12    Right total shoulder replacement Dr PM   • Removal of tonsils,<15 y/o      Tonsillectomy Alone   • Remv prostate,retropub,rad,ltd nodes  1994    Prostate surgery with \"little in the lymph nodes\", Tx'd with radiation therapy and Lupron since   • Shldr arthroscop,part acromioplas  5/13/2010    right shoulder arthroscopy, acromioplasty with debridement Dr GUSMAN   • Total knee replacement  7/29/2010    right knee total Dr. Gerardo Workman     Social History     Social History   • Marital status:      Spouse name: Chanel/Faith   • Number of children: 2   • Years of education: 16     Occupational History   • Retired   Other     [employer]     Social History Main Topics   • Smoking status: Former Smoker     Packs/day: 0.30     Years: 1.00     Types: Cigarettes     Quit date: 1/1/1963   • Smokeless tobacco: Never Used      Comment: smoked as a teenager only 1/2 pack a day   • Alcohol use 4.2 oz/week     7 Glasses of wine per week      Comment: 1 glass of wine daily   • Drug use: No   • Sexual activity: No      Comment: Has tried Viagra: not all that  well;  self injections;   inserts.  levitra: side effects more troublesome     Other Topics Concern   • Seat Belt Yes     uses all the time     Social History Narrative    Faith  = second wife        work at [employer] full time.        Family History   Problem Relation Age of Onset   • Cancer Mother         colon cancer   • Stroke Father         mild stroke     I have reviewed the past medical history, family history, social history, medications and allergies listed in the medical record as obtained by my nursing staff and support staff and agree with their documentation    REVIEW OF SYSTEMS:    All systems reviewed and are negative with the except of the findings noted in the history of present illness.        PROBLEM LIST:    Patient Active Problem List   Diagnosis   • History of prostate cancer   • Impotence of organic origin   • OSTEOARTHROS NOS-L/LEG-Knees bilat R Valg, L varus   • Rosacea   • Osteoarthritis knee   • Postgastric surgery syndromes   • OSTEOARTHROS NOS-SHLDER  RT ASD DEBR 5/13/10   • Right TKA 7/29/10   • Essential hypertension, benign   • Mixed incontinence urge and stress (male)(female)   • RT total shoulder arthroplasty 2/9/12   • hyponea noted by wife    • Nocturnal hypoxemia   • Obstructive sleep apnea   • Sleep hypopnea   • Abnormal CT scan   • Family history of malignant neoplasm of gastrointestinal tract   • Memory loss, short term   • History of MI (myocardial infarction)   • Gastric varices   • History of ventricular fibrillation   • Seborrhea   • Gait disturbance; shuffles    • 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   Current Facility-Administered Medications   Medication Dose Route Frequency Provider Last Rate Last Dose   • ondansetron (ZOFRAN) injection 4 mg  4 mg Intravenous BID PRN Pau Woods MD       • hydrALAZINE (APRESOLINE) injection 10 mg  10 mg Intravenous Q6H PRN Nieves Pino APNP       • sodium chloride 0.9% infusion   Intravenous Continuous PRN Michelle Law MD       • octreotide (SandoSTATIN) 500 mcg in sodium chloride 0.9 % 250 mL standard concentration infusion  50 mcg/hr Intravenous Continuous Ciara Broderick PA-C 25 mL/hr at 06/10/18 0623 50 mcg/hr at 06/10/18 0623   • sodium chloride 0.9% infusion   Intravenous Continuous PRN Ciara Broderick PA-C       • sodium chloride (PF) 0.9 % injection 2 mL  2 mL Injection 2 times per day ROBYN CrumpNP   2 mL at 06/10/18 0808   • sodium chloride (PF) 0.9 % injection 2 mL  2 mL Injection PRN Nieves Pino APNP       • sodium chloride (NORMAL SALINE) 0.9 % bolus 500 mL  500 mL Intravenous PRN Nieves Pino APNP       • nitroGLYcerin (NITROSTAT) sublingual tablet 0.4 mg  0.4 mg Sublingual Q5 Min PRN Nieves Pino APNP       • potassium chloride (KLOR-CON M) marc ER tablet 20 mEq  20 mEq Oral Q4H PRN Nieves Pino, APNP       • potassium chloride (KLOR-CON) packet 20 mEq  20 mEq Per NG tube Q4H PRN Nieves Pino APNP       • potassium chloride (KLOR-CON M) marc ER tablet 40 mEq  40 mEq Oral Q4H PRN Nieves Pino, APNP       • potassium chloride (KLOR-CON) packet 40 mEq  40 mEq Per NG tube Q4H PRN Nieves Pino, APNP       • sodium chloride 0.9 % flush bag 500 mL  500 mL Intravenous PRN Nieves Pino APNP       • chlorhexidine gluconate (PERIDEX) 0.12 % solution 15 mL  15 mL Swish & Spit PRN Nieves L Clewien,  APNP       • magnesium sulfate 2 g in 50 mL premix IVPB  2 g Intravenous Q4H PRN Nieves Pino, APNP       • magnesium sulfate 2 g in 50 mL premix IVPB  2 g Intravenous Daily PRN Nieves Pino, APNP 100 mL/hr at 06/09/18 2139 2 g at 06/09/18 2139   • magnesium sulfate 1 g in dextrose 5% 100 mL IVPB premix  1 g Intravenous Daily PRN Nieves Pino APNP 200 mL/hr at 06/10/18 0408 1 g at 06/10/18 0408   • sodium chloride 0.9% infusion   Intravenous Continuous Nieves Pino APNP 100 mL/hr at 06/10/18 0543     • chlorhexidine gluconate (PERIDEX) 0.12 % solution 15 mL  15 mL Swish & Spit 2 times per day Cortney Fermin MD        And   • chlorhexidine gluconate (PERIDEX) 0.12 % solution 15 mL  15 mL Swish & Spit PRN Cortney Fermin MD       • sodium chloride 0.9% infusion   Intravenous Continuous PRN Cortney Fermin MD       • sodium chloride 0.9% infusion   Intravenous Continuous PRN Cortney Fermin MD       • pantoprazole (PROTONIX) injection 40 mg  40 mg Intravenous Nightly Joseph Reardon MD   40 mg at 06/09/18 2022   • sodium chloride 0.9% infusion   Intravenous Continuous PRN Cortney Fermin MD       • piperacillin-tazobactam (ZOSYN) 3.375 g in sodium chloride 0.9 % 100 mL IVPB  3.375 g Intravenous 3 times per day Michelle Law MD 25 mL/hr at 06/10/18 0542 3.375 g at 06/10/18 0542   • HYDROmorphone (DILAUDID) injection 0.2 mg  0.2 mg Intravenous Q2H PRN Pau Woods MD   0.2 mg at 06/10/18 0807       OBJECTIVE:    VITAL SIGNS:    Vital Last Value 24 Hour Range   Temperature 98.9 °F (37.2 °C) (06/10/18 1020) Temp  Min: 98.2 °F (36.8 °C)  Max: 98.9 °F (37.2 °C)   Pulse 105 (06/10/18 1045) Pulse  Min: 90  Max: 128   Respiratory 24 (06/10/18 1045) Resp  Min: 11  Max: 36   Non-Invasive  Blood Pressure (!) 160/96 (06/10/18 1045) BP  Min: 119/68  Max: 196/120   Pulse Oximetry 99 % (06/10/18 1045) SpO2  Min: 87 %  Max: 100 %     Vital Today Admitted   Weight 104.8 kg (06/10/18 0600) Weight:  106.6 kg (06/09/18 0942)   Height N/A Height: 5' 11\" (180.3 cm) (06/09/18 0942)   BMI N/A BMI (Calculated): 32.84 (06/09/18 0942)     INTAKE/OUTPUT:      Intake/Output Summary (Last 24 hours) at 06/10/18 1050  Last data filed at 06/10/18 1026   Gross per 24 hour   Intake             2675 ml   Output             1311 ml   Net             1364 ml        PHYSICAL EXAM:    General:  The patient is well developed, well nourished, in no acute distress, appears stated age.   Neurologic:  Alert.  Head:  Normocephalic.  Eyes:  Normal conjunctivae and sclerae.  Respiratory:  Respiratory effort normal.   Cardiovascular:  Regular rate and rhythm.  Extremities:  No swelling or tenderness.  Gastrointestinal:  Soft and non tender.  Hypoactive bowel sounds.      LABORATORY DATA:    Lab Results   Component Value Date    SODIUM 147 (H) 06/10/2018    SODIUM 145 06/09/2018    POTASSIUM 3.7 06/10/2018    POTASSIUM 3.7 06/10/2018    CHLORIDE 112 (H) 06/10/2018    CHLORIDE 109 (H) 06/09/2018    CO2 24 06/10/2018    CO2 24 06/09/2018    BUN 29 (H) 06/10/2018    BUN 40 (H) 06/09/2018    CREATININE 1.04 06/10/2018    CREATININE 1.15 06/09/2018    GLUCOSE 175 (H) 06/10/2018    GLUCOSE 167 (H) 06/09/2018     Lab Results   Component Value Date    WBC 21.3 (H) 06/10/2018    WBC 7.5 06/09/2018    HCT 21.3 (L) 06/10/2018    HCT 23.2 (L) 06/10/2018    HGB 7.1 (L) 06/10/2018    HGB 7.8 (L) 06/10/2018     06/10/2018     06/09/2018     INR (no units)   Date Value   06/09/2018 1.1   06/03/2018 1.1     Lab Results   Component Value Date    AST 36 06/09/2018    AST 31 06/03/2018    GPT 43 06/09/2018    GPT 34 06/03/2018    GGTP 35 09/21/2004    ALKPT 48 06/09/2018    ALKPT 64 06/03/2018    BILIRUBIN 0.9 06/09/2018    BILIRUBIN 1.8 (H) 06/03/2018       IMAGING STUDIES:    Imaging studies reviewed.    CT Angio 6/6/18:  IMPRESSION:     1.  Chronic splenic artery occlusion. There is a hypertrophied left  inferior phrenic artery arising from the  celiac artery which reconstitutes  the spleen is through prominent submucosal arteries in the gastric fundus.  These correspond to the findings on endoscopy and are likely the source of  the patient's GI bleed. The splenic vein is patent.  2.  Staple line surrounding the right gastric body. Given the history of  prior surgery for gastric bleeding, this may represent sequela of prior  peptic ulcer surgery.  3.  Calcified bilateral pleural plaques, pleural thickening, and a trace  right pleural effusion are consistent with asbestos-related pleural  disease.    DIET:  NPO        CASE DISCUSSED WITH:  Patient, Family, RN and MD     A copy of this note was sent to the referring provider, thank you for involving me in the care of this patient.           no

## 2020-01-09 NOTE — H&P ADULT - NSHPPHYSICALEXAM_GEN_ALL_CORE
PHYSICAL EXAM:  GEN: No acute distress  LUNGS: Clear to auscultation bilaterally; no wheeze  HEART: S1/S2 present. RRR.  ABD: Soft, non-tender, non-distended. Bowel sounds present  MSK: NC/NC/NE/2+PP/SALMERON  NEURO: AAOX3, non-focal, CN 2-12 in tact. Resting tremor

## 2020-01-09 NOTE — ED ADULT TRIAGE NOTE - CHIEF COMPLAINT QUOTE
pt sent from Assisted living for a fall yesterday. pt denies any pain, head trauma , or loc. pt only takes ASA. pt was in the ED yesterday for a fall on the same day. pt states "I'm not sure I fall, it just happens".

## 2020-01-09 NOTE — ED ADULT NURSE NOTE - NSIMPLEMENTINTERV_GEN_ALL_ED
Implemented All Fall with Harm Risk Interventions:  [place] to call system. Call bell, personal items and telephone within reach. Instruct patient to call for assistance. Room bathroom lighting operational. Non-slip footwear when patient is off stretcher. Physically safe environment: no spills, clutter or unnecessary equipment. Stretcher in lowest position, wheels locked, appropriate side rails in place. Provide visual cue, wrist band, yellow gown, etc. Monitor gait and stability. Monitor for mental status changes and reorient to person, place, and time. Review medications for side effects contributing to fall risk. Reinforce activity limits and safety measures with patient and family. Provide visual clues: red socks.

## 2020-01-09 NOTE — H&P ADULT - ASSESSMENT
69 M with PMH of Parkinson's disease, gout, HTN, CVA ('98 and '16), DM presenting from assisted living s/p 2nd fall in two days.     #Repeat fall 2/2 unsteady gait   - Possible Vertigo vs parkinsons ataxia vs medication side effect   - Check orthostatic   - neurology evaluation for eval and possible medication adjustment   - PT rehab     #HO of CVA  - C/w ASA 81mg and Lipitor 10mg    #Parkinson's disease  - pt rehab   - C/w Carbidopa-Levodopa    - C/w entacapone     #Seizure disorder  - c/w keppra 500mg bid    #DM II  - FS qAC and qHS  Start insulin sliding scale if FS>180    #Gout   - c/w allopurinol     #HTN   - c/w carvedilol and lisinopril    Diet: DASH/ CC  Prophylaxis: Lovenox SQ  Dispo: rehab

## 2020-01-09 NOTE — ED PROVIDER NOTE - OBJECTIVE STATEMENT
68yo M with PMH of Parkinson's, gout, HTN, CVA, and DM presenting to ED s/p 2nd fall in two days. Patient was sent for admission by his PCP Dr. Nguyen 2/2 deconditioning and for inpatient neuro eval. Patient has full w/u yesterday including CTH and CT c-spine yesterday. Patient states around midnight overnight he collapsed and hit his head on the wall (left forehead). Endorses mild left paracervical muscular pain (no midline pain). No f/c, CP, SOB, abd pain, back pain, n/v/d, paresthesias, extremity weakness, syncope, LOC, or external injuries/bruising/lacerations. Has ambulated since fall today. Feels mildly unsteady when walking. Dr. Nguyen would like his left knee to be x-rayed (patient had mild soreness from fall yesterday, states pain has fully resolved). +Mild HA left frontal.

## 2020-01-09 NOTE — ED PROVIDER NOTE - NS ED ROS FT
Constitutional:  No fevers or chills.  Eyes:  No visual changes, eye pain, or discharge.  ENT:  No hearing changes. No sore throat.  Neck:  No neck stiffness, mild left paracervical muscular pain.  Cardiac:  No CP or edema.  Resp:  No cough or SOB.   GI:  No nausea, vomiting, diarrhea, or abdominal pain.  :  No dysuria, frequency, or hematuria.  MSK:  No myalgias or joint pain/swelling.  Neuro:  Mild HA (left frontal). No dizziness or weakness.   Skin:  No skin rash.

## 2020-01-09 NOTE — ED PROVIDER NOTE - PHYSICAL EXAMINATION
PHYSICAL EXAM: I have reviewed current vital signs.  GENERAL: NAD, well-nourished; well-developed.  HEAD:  Normocephalic, atraumatic.  EYES: EOMI, PERRL, conjunctiva and sclera clear.  ENT: MMM, no erythema/exudates, no dental/tongue trauma.  NECK: Supple, no midline TTP, mild left paracervical muscular TTP.   CHEST/LUNG: Clear to auscultation bilaterally; no wheezes, rales, or rhonchi.  HEART: Regular rate and rhythm, normal S1 and S2; no murmurs, rubs, or gallops.  ABDOMEN: Soft, nontender, nondistended.  EXTREMITIES:  2+ peripheral pulses; no edema. All extremities nontender.  MSK: No spinal midline TTP.  PSYCH: Cooperative, appropriate, normal mood and affect.  NEUROLOGY: A&O x 3. Motor 5/5 w/ full strength in all extremities. No focal neurological deficits. CN II - XII grossly intact. No facial droop/pronator drift.   SKIN: Warm and dry.

## 2020-01-09 NOTE — ED PROVIDER NOTE - CLINICAL SUMMARY MEDICAL DECISION MAKING FREE TEXT BOX
69M sent in by  for repeated falls and possible deconditions. Was seen at [facility] ED yesterday for the same complaint and has a repeat visit today for another fall. pt has some left knee pain, and left frontal HA after the fall and hitting his head. No loc. Pt is anox3, mild left knee ttp. NCAT, pearrl, ctab, rrr, all extremity with 4/4 strength. Left knee XR neg for fx or dislocations, given Tylenol for ha. admitted to  service, with neurology on board.

## 2020-01-09 NOTE — ED PROVIDER NOTE - CARE PLAN
Principal Discharge DX:	Frequent falls  Secondary Diagnosis:	Ataxia  Secondary Diagnosis:	Dizziness Principal Discharge DX:	Frequent falls  Secondary Diagnosis:	History of Parkinson's disease

## 2020-01-09 NOTE — PROGRESS NOTE ADULT - SUBJECTIVE AND OBJECTIVE BOX
ZAKIA MILLS  69y  Male    well known to me pt w parkinsons, htn, dm, resides in adult care facility  two days in a row falling- hit head yesterday, hurt left leg last night  progressively worse, deconditioning  may benefit from inpatient rehab, neuro eval  Patient is a 69y old  Male who presents with a chief complaint of   HPI:      PAST MEDICAL & SURGICAL HISTORY:  Gout  Diabetes mellitus  HTN (hypertension)  CVA (cerebral vascular accident)  Parkinson disease  No significant past surgical history    FAMILY HISTORY:  FH: CAD (coronary artery disease)  FH: hypertension    Social:    Home Medications:  acetaminophen 325 mg oral tablet: 2 tab(s) orally every 6 hours, As Needed for pain (19 Jul 2019 10:14)  allopurinol 100 mg oral tablet: 2 tab(s) orally once a day (19 Jul 2019 10:14)  aspirin 81 mg oral delayed release tablet: 1 tab(s) orally once a day (19 Jul 2019 10:14)  atorvastatin 40 mg oral tablet: 1 tab(s) orally once a day (at bedtime) (19 Jul 2019 10:14)  carbidopa-levodopa 25 mg-100 mg oral tablet: 1 tab(s) orally  4x a day: 8am, 12noon, 4pm and 8pm (19 Jul 2019 10:14)  carvedilol 12.5 mg oral tablet: 1 tab(s) orally every 12 hours (19 Jul 2019 10:14)  docusate sodium 100 mg oral capsule: 1 cap(s) orally 3 times a day (19 Jul 2019 10:14)  entacapone 200 mg oral tablet: 1 tab(s) orally 4 times a day with sinemet (19 Jul 2019 10:14)  levETIRAcetam 500 mg oral tablet: 1 tab(s) orally 2 times a day (19 Jul 2019 10:14)  lisinopril 10 mg oral tablet: 1 tab(s) orally once a day (19 Jul 2019 10:14)  melatonin 5 mg oral tablet: 1 tab(s) orally once a day (at bedtime), As needed, Insomnia (19 Jul 2019 10:14)  metFORMIN 1000 mg oral tablet: 1 tab(s) orally 2 times a day (19 Jul 2019 10:14)  predniSONE 20 mg oral tablet: 1 tab(s) orally once a day for 3 more days (19 Jul 2019 10:14)  rivastigmine 9.5 mg/24 hr transdermal film, extended release: 1 patch transdermal every 24 hours (19 Jul 2019 10:14)      MEDICATIONS  (STANDING):    MEDICATIONS  (PRN):      cefaclor (Unknown)  penicillin (Unknown)  sulfa drugs (Unknown)      INTERVAL HPI/OVERNIGHT EVENTS:        REVIEW OF SYSTEMS:  CONSTITUTIONAL: No fever, weight loss, or fatigue  EYES: No eye pain, visual disturbances, or discharge  ENMT:  No difficulty hearing, tinnitus, vertigo; No sinus or throat pain  NECK: No pain or stiffness  BREASTS: No pain, masses, or nipple discharge  RESPIRATORY: No cough, wheezing, chills or hemoptysis; No shortness of breath  CARDIOVASCULAR: No chest pain, palpitations, dizziness, or leg swelling  GASTROINTESTINAL: No abdominal or epigastric pain. No nausea, vomiting, or hematemesis; No diarrhea or constipation. No melena or hematochezia.  GENITOURINARY: No dysuria, frequency, hematuria, or incontinence  NEUROLOGICAL: No headaches, memory loss, loss of strength, numbness, or tremors  SKIN: No itching, burning, rashes, or lesions   LYMPH NODES: No enlarged glands  ENDOCRINE: No heat or cold intolerance; No hair loss  MUSCULOSKELETAL: No joint pain or swelling; No muscle, back, or extremity pain  PSYCHIATRIC: No depression, anxiety, mood swings, or difficulty sleeping  HEME/LYMPH: No easy bruising, or bleeding gums  ALLERY AND IMMUNOLOGIC: No hives or eczema    T(C): 37 (01-09-20 @ 16:09), Max: 37 (01-09-20 @ 16:09)  HR: 100 (01-09-20 @ 16:09) (65 - 100)  BP: 160/94 (01-09-20 @ 16:09) (136/78 - 160/94)  RR: 18 (01-09-20 @ 16:09) (18 - 18)  SpO2: 98% (01-09-20 @ 16:09) (98% - 99%)  Wt(kg): --Vital Signs Last 24 Hrs  T(C): 37 (09 Jan 2020 16:09), Max: 37 (09 Jan 2020 16:09)  T(F): 98.6 (09 Jan 2020 16:09), Max: 98.6 (09 Jan 2020 16:09)  HR: 100 (09 Jan 2020 16:09) (65 - 100)  BP: 160/94 (09 Jan 2020 16:09) (136/78 - 160/94)  BP(mean): --  RR: 18 (09 Jan 2020 16:09) (18 - 18)  SpO2: 98% (09 Jan 2020 16:09) (98% - 99%)    PHYSICAL EXAM:  GENERAL: NAD, well-groomed, well-developed  HEAD:  Atraumatic, Normocephalic  EYES: EOMI, PERRLA, conjunctiva and sclera clear  ENMT: No tonsillar erythema, exudates, or enlargement; Moist mucous membranes, Good dentition, No lesions  NECK: Supple, No JVD, Normal thyroid  NERVOUS SYSTEM:  Alert & Oriented X3, Good concentration; Motor Strength 5/5 B/L upper and lower extremities; DTRs 2+ intact and symmetric  CHEST/LUNG: Clear to percussion bilaterally; No rales, rhonchi, wheezing, or rubs  HEART: Regular rate and rhythm; No murmurs, rubs, or gallops  ABDOMEN: Soft, Nontender, Nondistended; Bowel sounds present  EXTREMITIES:  2+ Peripheral Pulses, No clubbing, cyanosis, or edema  LYMPH: No lymphadenopathy noted  SKIN: No rashes or lesions    Consultant(s) Notes Reviewed:  [x ] YES  [ ] NO  Care Discussed with Consultants/Other Providers [ x] YES  [ ] NO    LABS:                        15.1   6.72  )-----------( 136      ( 08 Jan 2020 14:10 )             45.1     01-08    145  |  106  |  9<L>  ----------------------------<  142<H>  4.5   |  27  |  0.9    Ca    8.8      08 Jan 2020 14:10    TPro  5.6<L>  /  Alb  3.6  /  TBili  0.5  /  DBili  x   /  AST  22  /  ALT  27  /  AlkPhos  74  01-08        RADIOLOGY & ADDITIONAL TESTS:    Imaging Personally Reviewed:  [ ] YES  [ ] NO    HEALTH ISSUES - PROBLEM Dx:    dizziness, gait ataxia, frequent falls, deconditioning-   needs neuro eval and would benefit from inpatient rehab  not safe d/c

## 2020-01-09 NOTE — H&P ADULT - HISTORY OF PRESENT ILLNESS
69 M with PMH of Parkinson's disease, gout, HTN, CVA ('98 and '16), DM presenting from assisted living s/p 2nd fall in two days. Patient was sent for admission by his PCP Dr. Nguyen 2/2 deconditioning and for inpatient neuro eval. Patient had two falls, once yesterday for which he came to ED and had trauma work up which was unremarkable. Patient recalls both events. He states he does not know how he fell both times however he had difficulty standing s/p fall with L sided > R sided lower extremity weakness. He hit his head from first fall and second fall hurt his lower extremity fall. He was discharged from ED with meclizine as patient states he has been having worsening symptoms of vertigo for the past 1 year. He otherwise denies any fevers chills, CP, SOB, abd pain, back pain, n/v/d, paresthesias, extremity weakness, syncope, LOC, or external injuries/bruising/lacerations. Typically ambulates with cane.

## 2020-01-10 DIAGNOSIS — Z02.9 ENCOUNTER FOR ADMINISTRATIVE EXAMINATIONS, UNSPECIFIED: ICD-10-CM

## 2020-01-10 LAB
GLUCOSE BLDC GLUCOMTR-MCNC: 100 MG/DL — HIGH (ref 70–99)
GLUCOSE BLDC GLUCOMTR-MCNC: 108 MG/DL — HIGH (ref 70–99)
GLUCOSE BLDC GLUCOMTR-MCNC: 188 MG/DL — HIGH (ref 70–99)
GLUCOSE BLDC GLUCOMTR-MCNC: 65 MG/DL — LOW (ref 70–99)
GLUCOSE BLDC GLUCOMTR-MCNC: 68 MG/DL — LOW (ref 70–99)
GLUCOSE BLDC GLUCOMTR-MCNC: 73 MG/DL — SIGNIFICANT CHANGE UP (ref 70–99)
LEVETIRACETAM SERPL-MCNC: 18.6 MCG/ML — SIGNIFICANT CHANGE UP (ref 12–46)

## 2020-01-10 PROCEDURE — 73501 X-RAY EXAM HIP UNI 1 VIEW: CPT | Mod: 26,LT

## 2020-01-10 PROCEDURE — 72110 X-RAY EXAM L-2 SPINE 4/>VWS: CPT | Mod: 26

## 2020-01-10 RX ORDER — CARBIDOPA AND LEVODOPA 25; 100 MG/1; MG/1
1.5 TABLET ORAL
Refills: 0 | Status: DISCONTINUED | OUTPATIENT
Start: 2020-01-10 | End: 2020-01-13

## 2020-01-10 RX ORDER — LISINOPRIL 2.5 MG/1
40 TABLET ORAL DAILY
Refills: 0 | Status: DISCONTINUED | OUTPATIENT
Start: 2020-01-10 | End: 2020-01-13

## 2020-01-10 RX ORDER — LISINOPRIL 2.5 MG/1
20 TABLET ORAL ONCE
Refills: 0 | Status: COMPLETED | OUTPATIENT
Start: 2020-01-10 | End: 2020-01-10

## 2020-01-10 RX ADMIN — ENTACAPONE 200 MILLIGRAM(S): 200 TABLET, FILM COATED ORAL at 17:30

## 2020-01-10 RX ADMIN — CARVEDILOL PHOSPHATE 12.5 MILLIGRAM(S): 80 CAPSULE, EXTENDED RELEASE ORAL at 05:29

## 2020-01-10 RX ADMIN — LISINOPRIL 20 MILLIGRAM(S): 2.5 TABLET ORAL at 15:39

## 2020-01-10 RX ADMIN — CARVEDILOL PHOSPHATE 12.5 MILLIGRAM(S): 80 CAPSULE, EXTENDED RELEASE ORAL at 17:30

## 2020-01-10 RX ADMIN — CARBIDOPA AND LEVODOPA 1.5 TABLET(S): 25; 100 TABLET ORAL at 17:30

## 2020-01-10 RX ADMIN — Medication 81 MILLIGRAM(S): at 11:52

## 2020-01-10 RX ADMIN — CARBIDOPA AND LEVODOPA 1 TABLET(S): 25; 100 TABLET ORAL at 05:49

## 2020-01-10 RX ADMIN — Medication 100 MILLIGRAM(S): at 21:42

## 2020-01-10 RX ADMIN — ATORVASTATIN CALCIUM 10 MILLIGRAM(S): 80 TABLET, FILM COATED ORAL at 21:42

## 2020-01-10 RX ADMIN — Medication 200 MILLIGRAM(S): at 11:52

## 2020-01-10 RX ADMIN — Medication 5 MILLIGRAM(S): at 21:42

## 2020-01-10 RX ADMIN — CARBIDOPA AND LEVODOPA 1.5 TABLET(S): 25; 100 TABLET ORAL at 11:52

## 2020-01-10 RX ADMIN — ENOXAPARIN SODIUM 40 MILLIGRAM(S): 100 INJECTION SUBCUTANEOUS at 11:52

## 2020-01-10 RX ADMIN — LEVETIRACETAM 500 MILLIGRAM(S): 250 TABLET, FILM COATED ORAL at 17:30

## 2020-01-10 RX ADMIN — LEVETIRACETAM 500 MILLIGRAM(S): 250 TABLET, FILM COATED ORAL at 05:30

## 2020-01-10 RX ADMIN — LISINOPRIL 20 MILLIGRAM(S): 2.5 TABLET ORAL at 05:29

## 2020-01-10 RX ADMIN — Medication 0.5 MILLIGRAM(S): at 21:42

## 2020-01-10 RX ADMIN — ENTACAPONE 200 MILLIGRAM(S): 200 TABLET, FILM COATED ORAL at 11:52

## 2020-01-10 RX ADMIN — ENTACAPONE 200 MILLIGRAM(S): 200 TABLET, FILM COATED ORAL at 05:49

## 2020-01-10 NOTE — PHYSICAL THERAPY INITIAL EVALUATION ADULT - PERTINENT HX OF CURRENT PROBLEM, REHAB EVAL
69 M with PMH of Parkinson's disease, gout, HTN, CVA ('98 and '16), DM presenting from assisted living s/p 2nd fall in two days

## 2020-01-10 NOTE — CONSULT NOTE ADULT - SUBJECTIVE AND OBJECTIVE BOX
HPI:  69 M with PMH of Parkinson's disease, gout, HTN, CVA ('98 and '16), DM presenting from assisted living s/p 2nd fall in two days. Patient was sent for admission by his PCP Dr. Nguyen 2/2 gilma and for inpatient neuro eval. Patient had two falls, once yesterday for which he came to ED and had trauma work up which was unremarkable. Patient recalls both events. He states he does not know how he fell both times however he had difficulty standing s/p fall with L sided > R sided lower extremity weakness. He hit his head from first fall and second fall hurt his lower extremity fall. He was discharged from ED with meclizine as patient states he has been having worsening symptoms of vertigo for the past 1 year. He otherwise denies any fevers chills, CP, SOB, abd pain, back pain, n/v/d, paresthesias, extremity weakness, syncope, LOC, or external injuries/bruising/lacerations. Typically ambulates with cane. (09 Jan 2020 19:52)      PAST MEDICAL & SURGICAL HISTORY:  Gout  Diabetes mellitus  HTN (hypertension)  CVA (cerebral vascular accident)  Parkinson disease  No significant past surgical history      Hospital Course:    TODAY'S SUBJECTIVE & REVIEW OF SYMPTOMS:     Constitutional WNL   Cardio WNL   Resp WNL   GI WNL  Heme WNL  Endo WNL  Skin WNL  MSK Weakness  Neuro WNL  Cognitive WNL  Psych WNL      MEDICATIONS  (STANDING):  allopurinol 200 milliGRAM(s) Oral daily  aspirin enteric coated 81 milliGRAM(s) Oral daily  atorvastatin 10 milliGRAM(s) Oral at bedtime  carbidopa/levodopa  25/100 1.5 Tablet(s) Oral four times a day  carvedilol 12.5 milliGRAM(s) Oral every 12 hours  clonazePAM  Tablet 0.5 milliGRAM(s) Oral at bedtime  enoxaparin Injectable 40 milliGRAM(s) SubCutaneous daily  entacapone 200 milliGRAM(s) Oral four times a day  levETIRAcetam 500 milliGRAM(s) Oral two times a day  lisinopril 20 milliGRAM(s) Oral daily  melatonin 5 milliGRAM(s) Oral at bedtime  traZODone 100 milliGRAM(s) Oral at bedtime    MEDICATIONS  (PRN):      FAMILY HISTORY:  FH: CAD (coronary artery disease)  FH: hypertension      Allergies    cefaclor (Unknown)  penicillin (Unknown)  sulfa drugs (Unknown)    Intolerances        SOCIAL HISTORY:    [  ] Etoh  [  ] Smoking  [  ] Substance abuse     Home Environment:  [  ] Home Alone  [  ] Lives with Family  [  ] Home Health Aid    Dwelling:  [  ] Apartment  [  ] Private House  [  ] Adult Home  [ x ] Skilled Nursing Facility      [  ] Short Term  [  ] Long Term  [  ] Stairs       Elevator [  ]    FUNCTIONAL STATUS PTA: (Check all that apply)  Ambulation: [ x  ]Independent    [  ] Dependent     [  ] Non-Ambulatory  Assistive Device: [  x] SA Cane  [  ]  Q Cane  [  ] Walker  [  ]  Wheelchair  ADL : [x  ] Independent  [  ]  Dependent       Vital Signs Last 24 Hrs  T(C): 36.4 (10 Shabbir 2020 07:41), Max: 37 (09 Jan 2020 16:09)  T(F): 97.5 (10 Shabbir 2020 07:41), Max: 98.6 (09 Jan 2020 16:09)  HR: 72 (10 Shabbir 2020 07:41) (64 - 100)  BP: 195/98 (10 Shabbir 2020 07:41) (160/94 - 195/98)  BP(mean): --  RR: 18 (10 Shabbir 2020 07:41) (18 - 18)  SpO2: 97% (10 Shabbir 2020 07:41) (97% - 99%)      PHYSICAL EXAM: Alert & Oriented X3  GENERAL: NAD, well-groomed, well-developed  HEAD:  Atraumatic, Normocephalic  CHEST/LUNG: Clear   HEART: S1S2+  ABDOMEN: Soft, Nontender  EXTREMITIES:  no calf tenderness    NERVOUS SYSTEM:  Cranial Nerves 2-12 intact [  ] Abnormal  [  ]  ROM: WFL all extremities [ x ]  Abnormal [  ]  Motor Strength: WFL all extremities  [ x ]  Abnormal [  ]  Sensation: intact to light touch [x  ] Abnormal [  ]  Reflexes: Symmetric [  ]  Abnormal [  ]    FUNCTIONAL STATUS:  Bed Mobility: Independent [  ]  Supervision [ x ]  Needs Assistance [  ]  N/A [  ]  Transfers: Independent [  ]  Supervision [x  ]  Needs Assistance [  ]  N/A [  ]   Ambulation: Independent [  ]  Supervision [  ]  Needs Assistance [  ]  N/A [  ]  ADL: Independent [  ] Requires Assistance [  ] N/A [  ]      LABS:                        15.1   6.72  )-----------( 136      ( 08 Jan 2020 14:10 )             45.1     01-08    145  |  106  |  9<L>  ----------------------------<  142<H>  4.5   |  27  |  0.9    Ca    8.8      08 Jan 2020 14:10    TPro  5.6<L>  /  Alb  3.6  /  TBili  0.5  /  DBili  x   /  AST  22  /  ALT  27  /  AlkPhos  74  01-08          RADIOLOGY & ADDITIONAL STUDIES:    Assesment:

## 2020-01-10 NOTE — PROGRESS NOTE ADULT - ASSESSMENT
69 M with PMH of Parkinson's disease, gout, HTN, CVA ('98 and '16), DM presenting from assisted living s/p 2nd fall in two days.     #Repeat falls from unsteady gait likely from parkinsons progression and physical deconditioning.  - Check orthostatic   - neurology evaluation appreciated, medications adjusted increased sinemet 25/100 to 1 1/2 tabs QID   - PT rehab eval  - f/u X ray pelvis, left knee and L/S spine for trauma w/up s/p fall.    #HO of CVA  - C/w ASA 81mg and Lipitor 10mg    #Parkinson's disease  - pt rehab   - C/w Carbidopa-Levodopa    - C/w entacapone     #Seizure disorder  - c/w keppra 500mg bid    #DM II  - FS qAC and qHS  Start insulin sliding scale if FS>180  - currently hypoglycemic not on any medications for DM  - encourage PO intake.    #Gout   - c/w allopurinol     #HTN   - c/w carvedilol and lisinopril    Diet: DASH/ CC  Prophylaxis: Lovenox SQ  Dispo: rehab

## 2020-01-10 NOTE — CONSULT NOTE ADULT - ASSESSMENT
A 68 yo man with multiple comorbidities adm for increasing ftequency of falls, worsening of PD sxs.    Impression:  - most likely progression of of PD  - h/o seizure d/o, stable  - h/o DM, gout, HTN, CVA     Plan:  - increase Sinemet 25/100 to 1 and 1/2 tab po qid  - cont Comtan 200mg po qid with Sinemet  - cont Keppra at current dose  - PT/Rehab eval  - soc service eval  - please call us for any questions  - f/u with neurology as OP after d/c

## 2020-01-10 NOTE — CONSULT NOTE ADULT - SUBJECTIVE AND OBJECTIVE BOX
Neurology consult    ZAKIA MILLS69yMale    HPI:  69 M with PMH of Parkinson's disease, gout, HTN, CVA ('98 and '16), DM presenting from assisted living s/p 2nd fall in two days. Patient was sent for admission by his PCP Dr. Nguyen 2/2 deconditioning and for inpatient neuro eval. Patient had two falls, once yesterday for which he came to ED and had trauma work up which was unremarkable. Patient recalls both events. He states he does not know how he fell both times however he had difficulty standing s/p fall with L sided > R sided lower extremity weakness. He hit his head from first fall and second fall hurt his lower extremity fall. He was discharged from ED with meclizine as patient states he has been having worsening symptoms of vertigo for the past 1 year. He otherwise denies any fevers chills, CP, SOB, abd pain, back pain, n/v/d, paresthesias, extremity weakness, syncope, LOC, or external injuries/bruising/lacerations. Typically ambulates with cane.   Has h/o sz d/o, denies recent sz,  last sz last adm in Jul 2019.      MEDICATIONS    allopurinol 200 milliGRAM(s) Oral daily  aspirin enteric coated 81 milliGRAM(s) Oral daily  atorvastatin 10 milliGRAM(s) Oral at bedtime  carbidopa/levodopa  25/100 1 Tablet(s) Oral four times a day  carvedilol 12.5 milliGRAM(s) Oral every 12 hours  clonazePAM  Tablet 0.5 milliGRAM(s) Oral at bedtime  enoxaparin Injectable 40 milliGRAM(s) SubCutaneous daily  entacapone 200 milliGRAM(s) Oral four times a day  levETIRAcetam 500 milliGRAM(s) Oral two times a day  lisinopril 20 milliGRAM(s) Oral daily  melatonin 5 milliGRAM(s) Oral at bedtime  traZODone 100 milliGRAM(s) Oral at bedtime      PAST MEDICAL & SURGICAL HISTORY:  Gout  Diabetes mellitus  HTN (hypertension)  CVA (cerebral vascular accident)  Parkinson disease  Seizure d/o  No significant past surgical history       Family history: No history of dementia, strokes, or seizures   FAMILY HISTORY:  FH: CAD (coronary artery disease)  FH: hypertension    SOCIAL HISTORY  Denies smoking,  ETOH, drug  abuse    Allergies  cefaclor (Unknown)  penicillin (Unknown)  sulfa drugs (Unknown)    Height (cm): 172.72 (01-09 @ 16:09)    Vital Signs Last 24 Hrs  T(C): 36.4 (10 Shabbir 2020 07:41), Max: 37 (09 Jan 2020 16:09)  T(F): 97.5 (10 Shabbir 2020 07:41), Max: 98.6 (09 Jan 2020 16:09)  HR: 72 (10 Shabbir 2020 07:41) (64 - 100)  BP: 195/98 (10 Shabbir 2020 07:41) (160/94 - 195/98)  BP(mean): --  RR: 18 (10 Shabbir 2020 07:41) (18 - 18)  SpO2: 97% (10 Shabbir 2020 07:41) (97% - 99%)    FREQUENT FALLS ATAXIA DIZZINESS  ^FREQUENT FALLS ATAXIA DIZZINESS  FH: CAD (coronary artery disease)  FH: hypertension  No pertinent family history in first degree relatives  Handoff  MEWS Score  Gout  Diabetes mellitus  HTN (hypertension)  CVA (cerebral vascular accident)  Parkinson disease  Frequent falls  No significant past surgical history  FALL  1  History of Parkinson's disease  Dizziness  Ataxia      REVIEW OF SYSTEMS:    Constitutional: No fever, chills, fatigue, weakness  Eyes: no eye pain, visual disturbances, or discharge  ENT:  No difficulty hearing, tinnitus, vertigo; No sinus or throat pain  Neck: No pain or stiffness  Respiratory: No cough, dyspnea, wheezing   Cardiovascular: No chest pain, palpitations,   Gastrointestinal: No abdominal or epigastric pain. No nausea, vomiting  No diarrhea or constipation.   Genitourinary: No dysuria, frequency, hematuria or incontinence  Neurological: As per hpi  Psychiatric: No depression, anxiety, mood swings or difficulty sleeping  Musculoskeletal: No joint pain or swelling; No muscle, back or extremity pain  Skin: No itching, burning, rashes or lesions   Lymph Nodes: No enlarged glands  Endocrine: As per [facility]  Allergy and Immunologic: No hives or eczema    PHYSICAL EXAMINATION:  General:  Well-developed, well nourished, in no acute distress.  Eyes: Conjunctiva and sclera clear.  Cardiovascular: Regular rate and rhythm; S1 and S2 Normal; No murmurs, gallops or rubs.  Neurologic:  - Mental Status:  Alert, awake, oriented to person, place, and time; Speech is fluent with intact naming, repetition, and comprehension.  - Cranial Nerves II-XII:  PERRLA, EOMI, no nystagmus, face simmetrical, hearing intact to finger rub bl, tongue midline  - Motor:  Strength is 5/5 throughout.  There is no pronator drift.  Normal muscle bulk and tone throughout.  - Reflexes:  2+ symmetric throughout.  Plantar responses flexor.  - Sensory:  Intact to light touch, pin prick  sense throughout.  - Coordination:  Finger-nose-finger and heel-knee-shin intact without dysmetria.  Rapid alternating hand movements intact.  - Gait:  No ataxia. Heel and toe walking are normal.  Tandem gait is normal.   Romberg testing is negative.    LABS:  CBC Full  -  ( 08 Jan 2020 14:10 )  WBC Count : 6.72 K/uL  RBC Count : 4.60 M/uL  Hemoglobin : 15.1 g/dL  Hematocrit : 45.1 %  Platelet Count - Automated : 136 K/uL  Mean Cell Volume : 98.0 fL  Mean Cell Hemoglobin : 32.8 pg  Mean Cell Hemoglobin Concentration : 33.5 g/dL  Auto Neutrophil # : 4.11 K/uL  Auto Lymphocyte # : 2.01 K/uL  Auto Monocyte # : 0.47 K/uL  Auto Eosinophil # : 0.09 K/uL  Auto Basophil # : 0.02 K/uL  Auto Neutrophil % : 61.2 %  Auto Lymphocyte % : 29.9 %  Auto Monocyte % : 7.0 %  Auto Eosinophil % : 1.3 %  Auto Basophil % : 0.3 %      01-08    145  |  106  |  9<L>  ----------------------------<  142<H>  4.5   |  27  |  0.9    Ca    8.8      08 Jan 2020 14:10    TPro  5.6<L>  /  Alb  3.6  /  TBili  0.5  /  DBili  x   /  AST  22  /  ALT  27  /  AlkPhos  74  01-08    Hemoglobin A1C:     LIVER FUNCTIONS - ( 08 Jan 2020 14:10 )  Alb: 3.6 g/dL / Pro: 5.6 g/dL / ALK PHOS: 74 U/L / ALT: 27 U/L / AST: 22 U/L / GGT: x             RADIOLOGY  < from: CT Cervical Spine No Cont (01.08.20 @ 15:46) >  CT head: No evidence of acute transcortical infarct, acute intracranial hemorrhage or mass effect.    CT cervical spine: No acute fracture or traumatic subluxation.     < end of copied text > Neurology consult    ZAKIA MILLS69yMale    HPI:  69 M with PMH of Parkinson's disease, gout, HTN, CVA ('98 and '16), DM presenting from assisted living s/p 2nd fall in two days. Patient was sent for admission by his PCP Dr. Nguyen 2/2 deconditioning and for inpatient neuro eval. Patient had two falls, once yesterday for which he came to ED and had trauma work up which was unremarkable. Patient recalls both events. He states he does not know how he fell both times however he had difficulty standing s/p fall with L sided > R sided lower extremity weakness. He hit his head from first fall and second fall hurt his lower extremity fall. He was discharged from ED with meclizine as patient states he has been having worsening symptoms of vertigo for the past 1 year. He otherwise denies any fevers chills, CP, SOB, abd pain, back pain, n/v/d, paresthesias, extremity weakness, syncope, LOC, or external injuries/bruising/lacerations. Typically ambulates with cane.   Has h/o sz d/o, denies recent sz,  last sz last adm in Jul 2019.  denies B&B disfunction Admits worsening of the gait, more difficulty to get out of the bed and chair.     MEDICATIONS    allopurinol 200 milliGRAM(s) Oral daily  aspirin enteric coated 81 milliGRAM(s) Oral daily  atorvastatin 10 milliGRAM(s) Oral at bedtime  carbidopa/levodopa  25/100 1 Tablet(s) Oral four times a day  carvedilol 12.5 milliGRAM(s) Oral every 12 hours  clonazePAM  Tablet 0.5 milliGRAM(s) Oral at bedtime  enoxaparin Injectable 40 milliGRAM(s) SubCutaneous daily  entacapone 200 milliGRAM(s) Oral four times a day  levETIRAcetam 500 milliGRAM(s) Oral two times a day  lisinopril 20 milliGRAM(s) Oral daily  melatonin 5 milliGRAM(s) Oral at bedtime  traZODone 100 milliGRAM(s) Oral at bedtime      PAST MEDICAL & SURGICAL HISTORY:  Gout  Diabetes mellitus  HTN (hypertension)  CVA (cerebral vascular accident)  Parkinson disease  Seizure d/o  No significant past surgical history       Family history: No history of dementia, strokes, or seizures   FAMILY HISTORY:  FH: CAD (coronary artery disease)  FH: hypertension    SOCIAL HISTORY  Denies smoking,  ETOH, drug  abuse    Allergies  cefaclor (Unknown)  penicillin (Unknown)  sulfa drugs (Unknown)    Height (cm): 172.72 (01-09 @ 16:09)    Vital Signs Last 24 Hrs  T(C): 36.4 (10 Shabbir 2020 07:41), Max: 37 (09 Jan 2020 16:09)  T(F): 97.5 (10 Shabbir 2020 07:41), Max: 98.6 (09 Jan 2020 16:09)  HR: 72 (10 Shabbir 2020 07:41) (64 - 100)  BP: 195/98 (10 Shabbir 2020 07:41) (160/94 - 195/98)  BP(mean): --  RR: 18 (10 Shabbir 2020 07:41) (18 - 18)  SpO2: 97% (10 Shabbir 2020 07:41) (97% - 99%)    FREQUENT FALLS ATAXIA DIZZINESS  ^FREQUENT FALLS ATAXIA DIZZINESS  FH: CAD (coronary artery disease)  FH: hypertension  No pertinent family history in first degree relatives  Handoff  MEWS Score  Gout  Diabetes mellitus  HTN (hypertension)  CVA (cerebral vascular accident)  Parkinson disease  Frequent falls  No significant past surgical history  FALL  1  History of Parkinson's disease  Dizziness  Ataxia      REVIEW OF SYSTEMS:    Constitutional: No fever, chills, fatigue, weakness  Eyes: no eye pain, visual disturbances, or discharge  ENT:  No difficulty hearing, tinnitus, vertigo; No sinus or throat pain  Neck: No pain or stiffness  Respiratory: No cough, dyspnea, wheezing   Cardiovascular: No chest pain, palpitations,   Gastrointestinal: No abdominal or epigastric pain. No nausea, vomiting  No diarrhea or constipation.   Genitourinary: No dysuria, frequency, hematuria or incontinence  Neurological: As per hpi  Psychiatric: No depression, anxiety, mood swings or difficulty sleeping  Musculoskeletal: No joint pain or swelling; No muscle, back or extremity pain  Skin: No itching, burning, rashes or lesions   Lymph Nodes: No enlarged glands  Endocrine: As per PMH  Allergy and Immunologic: No hives or eczema    PHYSICAL EXAMINATION:  General:  Well-developed, well nourished, in no acute distress.  Eyes: Conjunctiva and sclera clear.  Cardiovascular: Regular rate and rhythm; S1 and S2 Normal; No murmurs, gallops or rubs.  Neurologic:  - Mental Status:  Alert, awake, oriented to person, place, disoriented to time; Speech is fluent with intact naming, repetition, and comprehension. Impaired short and long term memory.  - Cranial Nerves II-XII:  PERRLA, EOMI, no nystagmus, face symmetrical hearing intact to finger rub bl, tongue midline  - Motor:  Strength is 5/5 throughout.  There is no pronator drift.  Cogwheel rigidity R> L wrist, bradykinesia.  - Reflexes:  2+ symmetric throughout.  Plantar responses flexor.  - Sensory:  Intact to light touch, pin prick  sense throughout.  - Coordination:  Finger-nose-finger intact without dysmetria.  Rapid alternating hand movements with dysrhythmia.  - Gait:  unsteady, shuffling    LABS:  CBC Full  -  ( 08 Jan 2020 14:10 )  WBC Count : 6.72 K/uL  RBC Count : 4.60 M/uL  Hemoglobin : 15.1 g/dL  Hematocrit : 45.1 %  Platelet Count - Automated : 136 K/uL  Mean Cell Volume : 98.0 fL  Mean Cell Hemoglobin : 32.8 pg  Mean Cell Hemoglobin Concentration : 33.5 g/dL  Auto Neutrophil # : 4.11 K/uL  Auto Lymphocyte # : 2.01 K/uL  Auto Monocyte # : 0.47 K/uL  Auto Eosinophil # : 0.09 K/uL  Auto Basophil # : 0.02 K/uL  Auto Neutrophil % : 61.2 %  Auto Lymphocyte % : 29.9 %  Auto Monocyte % : 7.0 %  Auto Eosinophil % : 1.3 %  Auto Basophil % : 0.3 %      01-08    145  |  106  |  9<L>  ----------------------------<  142<H>  4.5   |  27  |  0.9    Ca    8.8      08 Jan 2020 14:10    TPro  5.6<L>  /  Alb  3.6  /  TBili  0.5  /  DBili  x   /  AST  22  /  ALT  27  /  AlkPhos  74  01-08    Hemoglobin A1C:     LIVER FUNCTIONS - ( 08 Jan 2020 14:10 )  Alb: 3.6 g/dL / Pro: 5.6 g/dL / ALK PHOS: 74 U/L / ALT: 27 U/L / AST: 22 U/L / GGT: x             RADIOLOGY  < from: CT Cervical Spine No Cont (01.08.20 @ 15:46) >  CT head: No evidence of acute transcortical infarct, acute intracranial hemorrhage or mass effect.    CT cervical spine: No acute fracture or traumatic subluxation.     < end of copied text >

## 2020-01-10 NOTE — PROGRESS NOTE ADULT - SUBJECTIVE AND OBJECTIVE BOX
ZAKIA MILLS 69y Male  MRN#: [id number]   CODE STATUS       SUBJECTIVE  Patient is a 69y old Male who presents with a chief complaint of repeat falls (10 Shabbir 2020 10:19)    Currently admitted to medicine with the primary diagnosis of Frequent falls    Today is hospital day 1d, and this morning he is not having any active problems, has the tremor. Did not get up to walk.      OBJECTIVE  PAST MEDICAL & SURGICAL HISTORY  Gout  Diabetes mellitus  HTN (hypertension)  CVA (cerebral vascular accident)  Parkinson disease  No significant past surgical history    ALLERGIES:  cefaclor (Unknown)  penicillin (Unknown)  sulfa drugs (Unknown)    MEDICATIONS:  STANDING MEDICATIONS  allopurinol 200 milliGRAM(s) Oral daily  aspirin enteric coated 81 milliGRAM(s) Oral daily  atorvastatin 10 milliGRAM(s) Oral at bedtime  carbidopa/levodopa  25/100 1.5 Tablet(s) Oral four times a day  carvedilol 12.5 milliGRAM(s) Oral every 12 hours  clonazePAM  Tablet 0.5 milliGRAM(s) Oral at bedtime  enoxaparin Injectable 40 milliGRAM(s) SubCutaneous daily  entacapone 200 milliGRAM(s) Oral four times a day  levETIRAcetam 500 milliGRAM(s) Oral two times a day  lisinopril 20 milliGRAM(s) Oral daily  melatonin 5 milliGRAM(s) Oral at bedtime  traZODone 100 milliGRAM(s) Oral at bedtime    PRN MEDICATIONS      VITAL SIGNS: Last 24 Hours  T(C): 36.4 (10 Shabbir 2020 07:41), Max: 37 (09 Jan 2020 16:09)  T(F): 97.5 (10 Shabbir 2020 07:41), Max: 98.6 (09 Jan 2020 16:09)  HR: 72 (10 Shabbir 2020 07:41) (64 - 100)  BP: 195/98 (10 Shabbir 2020 07:41) (160/94 - 195/98)  BP(mean): --  RR: 18 (10 Shabbir 2020 07:41) (18 - 18)  SpO2: 97% (10 Shabbir 2020 07:41) (97% - 99%)    LABS:                        15.1   6.72  )-----------( 136      ( 08 Jan 2020 14:10 )             45.1     01-08    145  |  106  |  9<L>  ----------------------------<  142<H>  4.5   |  27  |  0.9    Ca    8.8      08 Jan 2020 14:10    TPro  5.6<L>  /  Alb  3.6  /  TBili  0.5  /  DBili  x   /  AST  22  /  ALT  27  /  AlkPhos  74  01-08              CARDIAC MARKERS ( 08 Jan 2020 14:10 )  x     / <0.01 ng/mL / x     / x     / x          RADIOLOGY:  < from: Xray Knee 3 Views, Left (01.09.20 @ 17:55) >  IMPRESSION:    No acute osseous or articular abnormality.      < end of copied text >  < from: CT Cervical Spine No Cont (01.08.20 @ 15:46) >  IMPRESSION:  CT head: No evidence of acute transcortical infarct, acute intracranial hemorrhage or mass effect.    CT cervical spine: No acute fracture or traumatic subluxation.     < end of copied text >      PHYSICAL EXAM:    GENERAL: NAD, well-developed, AAOx3. Tremors present  HEENT:  Atraumatic, Normocephalic. EOMI, PERRLA, conjunctiva and sclera clear, No JVD  PULMONARY: Clear to auscultation bilaterally; No wheeze  CARDIOVASCULAR: Regular rate and rhythm; No murmurs, rubs, or gallops  GASTROINTESTINAL: Soft, Nontender, Nondistended; Bowel sounds present  MUSCULOSKELETAL:  Peripheral Pulses palpable, No clubbing, cyanosis, or edema  NEUROLOGY: non-focal  SKIN: No rashes or lesions

## 2020-01-10 NOTE — CONSULT NOTE ADULT - ASSESSMENT
IMPRESSION: Rehab of gait dysfunction / parkinsons disease    PRECAUTIONS: [  ] Cardiac  [  ] Respiratory  [  ] Seizures [  ] Contact Isolation  [  ] Droplet Isolation  [  ] Other    Weight Bearing Status:     RECOMMENDATION:    Out of Bed to Chair     DVT/Decubiti Prophylaxis    REHAB PLAN:     [x   ] Bedside P/T 3-5 times a week   [   ]   Bedside O/T  2-3 times a week             [   ] No Rehab Therapy Indicated                   [   ]  Speech Therapy   Conditioning/ROM                                    ADL  Bed Mobility                                               Conditioning/ROM  Transfers                                                     Bed Mobility  Sitting /Standing Balance                         Transfers                                        Gait Training                                               Sitting/Standing Balance  Stair Training [   ]Applicable                    Home equipment Eval                                                                        Splinting  [   ] Only      GOALS:   ADL   [   ]   Independent                    Transfers  [x   ] Independent                          Ambulation  [ x  ] Independent     [   x ] With device                            [   ]  CG                                                         [   ]  CG                                                                  [   ] CG                            [    ] Min A                                                   [   ] Min A                                                              [   ] Min  A          DISCHARGE PLAN:   [   ]  Good candidate for Intensive Rehabilitation/Hospital based                                             Will tolerate 3hrs Intensive Rehab Daily                                       [   x ]  Short Term Rehab in Skilled Nursing Facility / [place]                                       [    ]  Home with Outpatient or  services                                         [    ]  Possible Candidate for Intensive Hospital based Rehab

## 2020-01-10 NOTE — PROGRESS NOTE ADULT - SUBJECTIVE AND OBJECTIVE BOX
ZAKIA MILLS  69y  Male  c/o pain on left side and hip- needs hip xray as well  constant dizziness  admits feeling"OFF"  alert, orientedx2  bp on high side  fingerstick after eating full brekfast with juice is only 74-?? hypoglycemia as source of dizziness- get hgb a1c, monitor fingersticks  needs neuro f/u- spoke to dr cheryl romero for inpatient rehab while monitoring glucose and bp- needs gait and balance training    INTERVAL EVENTS:    T(C): 36.4 (01-10-20 @ 07:41), Max: 37 (01-09-20 @ 16:09)  HR: 72 (01-10-20 @ 07:41) (64 - 100)  BP: 195/98 (01-10-20 @ 07:41) (160/94 - 195/98)  RR: 18 (01-10-20 @ 07:41) (18 - 18)  SpO2: 97% (01-10-20 @ 07:41) (97% - 99%)  Wt(kg): --Vital Signs Last 24 Hrs  T(C): 36.4 (10 Shabbir 2020 07:41), Max: 37 (09 Jan 2020 16:09)  T(F): 97.5 (10 Shabbir 2020 07:41), Max: 98.6 (09 Jan 2020 16:09)  HR: 72 (10 Shabbir 2020 07:41) (64 - 100)  BP: 195/98 (10 Shabbir 2020 07:41) (160/94 - 195/98)  BP(mean): --  RR: 18 (10 Shabbir 2020 07:41) (18 - 18)  SpO2: 97% (10 Shabbir 2020 07:41) (97% - 99%)    PHYSICAL EXAM:  GENERAL: resting comfortably  NECK: Supple, No JVD, Normal thyroid  CHEST/LUNG: Clear; No crackles or wheezing  HEART: S1, S2, Regular rate and rhythm;   ABDOMEN: Soft, Nontender, Nondistended; Bowel sounds present  EXTREMITIES: No clubbing, cyanosis, or edema, lef hip and pelvis tenderness  SKIN: No rashes or lesions  tongue- twisted and somewhat deviated- chronic  no acute neuro deficit  LABS:                          15.1   6.72  )-----------( 136      ( 08 Jan 2020 14:10 )             45.1     01-08    145  |  106  |  9<L>  ----------------------------<  142<H>  4.5   |  27  |  0.9    Ca    8.8      08 Jan 2020 14:10    TPro  5.6<L>  /  Alb  3.6  /  TBili  0.5  /  DBili  x   /  AST  22  /  ALT  27  /  AlkPhos  74  01-08              allopurinol 200 milliGRAM(s) Oral daily  aspirin enteric coated 81 milliGRAM(s) Oral daily  atorvastatin 10 milliGRAM(s) Oral at bedtime  carbidopa/levodopa  25/100 1 Tablet(s) Oral four times a day  carvedilol 12.5 milliGRAM(s) Oral every 12 hours  clonazePAM  Tablet 0.5 milliGRAM(s) Oral at bedtime  enoxaparin Injectable 40 milliGRAM(s) SubCutaneous daily  entacapone 200 milliGRAM(s) Oral four times a day  levETIRAcetam 500 milliGRAM(s) Oral two times a day  lisinopril 20 milliGRAM(s) Oral daily  melatonin 5 milliGRAM(s) Oral at bedtime  traZODone 100 milliGRAM(s) Oral at bedtime      RADIOLOGY & ADDITIONAL TESTS:    case discussed with the resident  Available consult notes reviewed    Assessment and plan:         neuro, rehab  monitor bp- increase lisinopril,  rehab eval  x ray left hip and pelvis  monitor fingersticks

## 2020-01-10 NOTE — PHYSICAL THERAPY INITIAL EVALUATION ADULT - GENERAL OBSERVATIONS, REHAB EVAL
pt encountered supine in bed in hallway of ED - agreeable to PT IE - pt says he gets lightheaded/dizzy with ambulation sometimes RN made aware

## 2020-01-11 ENCOUNTER — TRANSCRIPTION ENCOUNTER (OUTPATIENT)
Age: 70
End: 2020-01-11

## 2020-01-11 LAB
ALBUMIN SERPL ELPH-MCNC: 3.5 G/DL — SIGNIFICANT CHANGE UP (ref 3.5–5.2)
ALP SERPL-CCNC: 63 U/L — SIGNIFICANT CHANGE UP (ref 30–115)
ALT FLD-CCNC: <5 U/L — SIGNIFICANT CHANGE UP (ref 0–41)
ANION GAP SERPL CALC-SCNC: 11 MMOL/L — SIGNIFICANT CHANGE UP (ref 7–14)
AST SERPL-CCNC: 20 U/L — SIGNIFICANT CHANGE UP (ref 0–41)
BASOPHILS # BLD AUTO: 0.02 K/UL — SIGNIFICANT CHANGE UP (ref 0–0.2)
BASOPHILS NFR BLD AUTO: 0.3 % — SIGNIFICANT CHANGE UP (ref 0–1)
BILIRUB SERPL-MCNC: 1 MG/DL — SIGNIFICANT CHANGE UP (ref 0.2–1.2)
BUN SERPL-MCNC: 9 MG/DL — LOW (ref 10–20)
CALCIUM SERPL-MCNC: 8.9 MG/DL — SIGNIFICANT CHANGE UP (ref 8.5–10.1)
CHLORIDE SERPL-SCNC: 106 MMOL/L — SIGNIFICANT CHANGE UP (ref 98–110)
CO2 SERPL-SCNC: 27 MMOL/L — SIGNIFICANT CHANGE UP (ref 17–32)
CREAT SERPL-MCNC: 0.9 MG/DL — SIGNIFICANT CHANGE UP (ref 0.7–1.5)
EOSINOPHIL # BLD AUTO: 0.11 K/UL — SIGNIFICANT CHANGE UP (ref 0–0.7)
EOSINOPHIL NFR BLD AUTO: 1.4 % — SIGNIFICANT CHANGE UP (ref 0–8)
GLUCOSE BLDC GLUCOMTR-MCNC: 112 MG/DL — HIGH (ref 70–99)
GLUCOSE BLDC GLUCOMTR-MCNC: 115 MG/DL — HIGH (ref 70–99)
GLUCOSE BLDC GLUCOMTR-MCNC: 150 MG/DL — HIGH (ref 70–99)
GLUCOSE BLDC GLUCOMTR-MCNC: 62 MG/DL — LOW (ref 70–99)
GLUCOSE BLDC GLUCOMTR-MCNC: 62 MG/DL — LOW (ref 70–99)
GLUCOSE BLDC GLUCOMTR-MCNC: 68 MG/DL — LOW (ref 70–99)
GLUCOSE SERPL-MCNC: 92 MG/DL — SIGNIFICANT CHANGE UP (ref 70–99)
HCT VFR BLD CALC: 43.3 % — SIGNIFICANT CHANGE UP (ref 42–52)
HGB BLD-MCNC: 14.7 G/DL — SIGNIFICANT CHANGE UP (ref 14–18)
IMM GRANULOCYTES NFR BLD AUTO: 0.4 % — HIGH (ref 0.1–0.3)
LYMPHOCYTES # BLD AUTO: 2 K/UL — SIGNIFICANT CHANGE UP (ref 1.2–3.4)
LYMPHOCYTES # BLD AUTO: 25.5 % — SIGNIFICANT CHANGE UP (ref 20.5–51.1)
MAGNESIUM SERPL-MCNC: 1.8 MG/DL — SIGNIFICANT CHANGE UP (ref 1.8–2.4)
MCHC RBC-ENTMCNC: 33.4 PG — HIGH (ref 27–31)
MCHC RBC-ENTMCNC: 33.9 G/DL — SIGNIFICANT CHANGE UP (ref 32–37)
MCV RBC AUTO: 98.4 FL — HIGH (ref 80–94)
MONOCYTES # BLD AUTO: 0.67 K/UL — HIGH (ref 0.1–0.6)
MONOCYTES NFR BLD AUTO: 8.6 % — SIGNIFICANT CHANGE UP (ref 1.7–9.3)
NEUTROPHILS # BLD AUTO: 5 K/UL — SIGNIFICANT CHANGE UP (ref 1.4–6.5)
NEUTROPHILS NFR BLD AUTO: 63.8 % — SIGNIFICANT CHANGE UP (ref 42.2–75.2)
NRBC # BLD: 0 /100 WBCS — SIGNIFICANT CHANGE UP (ref 0–0)
PLATELET # BLD AUTO: 140 K/UL — SIGNIFICANT CHANGE UP (ref 130–400)
POTASSIUM SERPL-MCNC: 4.2 MMOL/L — SIGNIFICANT CHANGE UP (ref 3.5–5)
POTASSIUM SERPL-SCNC: 4.2 MMOL/L — SIGNIFICANT CHANGE UP (ref 3.5–5)
PROT SERPL-MCNC: 5.5 G/DL — LOW (ref 6–8)
RBC # BLD: 4.4 M/UL — LOW (ref 4.7–6.1)
RBC # FLD: 12 % — SIGNIFICANT CHANGE UP (ref 11.5–14.5)
SODIUM SERPL-SCNC: 144 MMOL/L — SIGNIFICANT CHANGE UP (ref 135–146)
WBC # BLD: 7.83 K/UL — SIGNIFICANT CHANGE UP (ref 4.8–10.8)
WBC # FLD AUTO: 7.83 K/UL — SIGNIFICANT CHANGE UP (ref 4.8–10.8)

## 2020-01-11 PROCEDURE — 93010 ELECTROCARDIOGRAM REPORT: CPT

## 2020-01-11 RX ORDER — METFORMIN HYDROCHLORIDE 850 MG/1
500 TABLET ORAL AT BEDTIME
Refills: 0 | Status: DISCONTINUED | OUTPATIENT
Start: 2020-01-11 | End: 2020-01-11

## 2020-01-11 RX ORDER — HYDRALAZINE HCL 50 MG
50 TABLET ORAL ONCE
Refills: 0 | Status: COMPLETED | OUTPATIENT
Start: 2020-01-11 | End: 2020-01-11

## 2020-01-11 RX ADMIN — CARBIDOPA AND LEVODOPA 1.5 TABLET(S): 25; 100 TABLET ORAL at 05:11

## 2020-01-11 RX ADMIN — Medication 0.5 MILLIGRAM(S): at 21:19

## 2020-01-11 RX ADMIN — CARVEDILOL PHOSPHATE 12.5 MILLIGRAM(S): 80 CAPSULE, EXTENDED RELEASE ORAL at 05:12

## 2020-01-11 RX ADMIN — CARBIDOPA AND LEVODOPA 1.5 TABLET(S): 25; 100 TABLET ORAL at 17:09

## 2020-01-11 RX ADMIN — CARBIDOPA AND LEVODOPA 1.5 TABLET(S): 25; 100 TABLET ORAL at 11:55

## 2020-01-11 RX ADMIN — ENTACAPONE 200 MILLIGRAM(S): 200 TABLET, FILM COATED ORAL at 17:08

## 2020-01-11 RX ADMIN — LEVETIRACETAM 500 MILLIGRAM(S): 250 TABLET, FILM COATED ORAL at 05:12

## 2020-01-11 RX ADMIN — CARVEDILOL PHOSPHATE 12.5 MILLIGRAM(S): 80 CAPSULE, EXTENDED RELEASE ORAL at 17:10

## 2020-01-11 RX ADMIN — ENTACAPONE 200 MILLIGRAM(S): 200 TABLET, FILM COATED ORAL at 00:04

## 2020-01-11 RX ADMIN — ENTACAPONE 200 MILLIGRAM(S): 200 TABLET, FILM COATED ORAL at 05:12

## 2020-01-11 RX ADMIN — ENOXAPARIN SODIUM 40 MILLIGRAM(S): 100 INJECTION SUBCUTANEOUS at 11:54

## 2020-01-11 RX ADMIN — Medication 81 MILLIGRAM(S): at 11:52

## 2020-01-11 RX ADMIN — ATORVASTATIN CALCIUM 10 MILLIGRAM(S): 80 TABLET, FILM COATED ORAL at 21:15

## 2020-01-11 RX ADMIN — Medication 50 MILLIGRAM(S): at 16:52

## 2020-01-11 RX ADMIN — Medication 5 MILLIGRAM(S): at 21:15

## 2020-01-11 RX ADMIN — CARBIDOPA AND LEVODOPA 1.5 TABLET(S): 25; 100 TABLET ORAL at 23:09

## 2020-01-11 RX ADMIN — Medication 100 MILLIGRAM(S): at 21:15

## 2020-01-11 RX ADMIN — CARBIDOPA AND LEVODOPA 1.5 TABLET(S): 25; 100 TABLET ORAL at 00:02

## 2020-01-11 RX ADMIN — LISINOPRIL 40 MILLIGRAM(S): 2.5 TABLET ORAL at 05:11

## 2020-01-11 RX ADMIN — Medication 200 MILLIGRAM(S): at 11:54

## 2020-01-11 RX ADMIN — LEVETIRACETAM 500 MILLIGRAM(S): 250 TABLET, FILM COATED ORAL at 17:08

## 2020-01-11 RX ADMIN — ENTACAPONE 200 MILLIGRAM(S): 200 TABLET, FILM COATED ORAL at 11:54

## 2020-01-11 RX ADMIN — ENTACAPONE 200 MILLIGRAM(S): 200 TABLET, FILM COATED ORAL at 23:08

## 2020-01-11 NOTE — DISCHARGE NOTE NURSING/CASE MANAGEMENT/SOCIAL WORK - PATIENT PORTAL LINK FT
You can access the FollowMyHealth Patient Portal offered by [facility] by registering at the following website: [email or web address]. By joining [facility]’s FollowMyHealth portal, you will also be able to view your health information using other applications (apps) compatible with our system.

## 2020-01-11 NOTE — PROGRESS NOTE ADULT - SUBJECTIVE AND OBJECTIVE BOX
SUBJECTIVE:    Patient is a 69y old Male who presents with a chief complaint of repeat falls (10 Shabbir 2020 11:45)    Currently admitted to medicine with the primary diagnosis of Frequent falls     Today is hospital day 2d. This morning he is resting comfortably in bed and reports no new issues or overnight events.     PAST MEDICAL & SURGICAL HISTORY  Gout  Diabetes mellitus  HTN (hypertension)  CVA (cerebral vascular accident)  Parkinson disease  No significant past surgical history    SOCIAL HISTORY:  Negative for smoking/alcohol/drug use.     ALLERGIES:  cefaclor (Unknown)  penicillin (Unknown)  sulfa drugs (Unknown)    MEDICATIONS:  STANDING MEDICATIONS  allopurinol 200 milliGRAM(s) Oral daily  aspirin enteric coated 81 milliGRAM(s) Oral daily  atorvastatin 10 milliGRAM(s) Oral at bedtime  carbidopa/levodopa  25/100 1.5 Tablet(s) Oral four times a day  carvedilol 12.5 milliGRAM(s) Oral every 12 hours  clonazePAM  Tablet 0.5 milliGRAM(s) Oral at bedtime  enoxaparin Injectable 40 milliGRAM(s) SubCutaneous daily  entacapone 200 milliGRAM(s) Oral four times a day  levETIRAcetam 500 milliGRAM(s) Oral two times a day  lisinopril 40 milliGRAM(s) Oral daily  melatonin 5 milliGRAM(s) Oral at bedtime  traZODone 100 milliGRAM(s) Oral at bedtime    PRN MEDICATIONS    VITALS:   T(F): 96.6  HR: 65  BP: 178/97  RR: 17  SpO2: 97%    LABS:                        14.7   7.83  )-----------( 140      ( 11 Jan 2020 07:58 )             43.3     01-11    144  |  106  |  9<L>  ----------------------------<  92  4.2   |  27  |  0.9    Ca    8.9      11 Jan 2020 07:58  Mg     1.8     01-11    TPro  5.5<L>  /  Alb  3.5  /  TBili  1.0  /  DBili  x   /  AST  20  /  ALT  <5  /  AlkPhos  63  01-11    PHYSICAL EXAM:  GEN: No acute distress, NC/AT, anicteric  LUNGS: Clear to auscultation bilaterally, no wheezes  HEART: S1/S2 present. RRR.   ABD: Soft, non-tender, non-distended. Bowel sounds present  EXT: NC/NC/NE/2+PP/SALMERON/Skin Intact.   NEURO: AAOX3    Intravenous access: Peripheral access  NG tube: None  Ewing Catheter: None

## 2020-01-11 NOTE — PROVIDER CONTACT NOTE (OTHER) - SITUATION
Pt noted ambulating around unit, when pt oob to chair he disarms the alarm. Pt getting up and ambulating without his cane. Pt walking through the nursing station. Pt a/ox2. MD Bay notifed, Pt noted ambulating around unit, when pt oob to chair he disarms the alarm. Pt getting up and ambulating without his cane. Pt walking through the nursing station. Pt a/ox1. MD Bay notifed,

## 2020-01-11 NOTE — PROVIDER CONTACT NOTE (OTHER) - ACTION/TREATMENT ORDERED:
MD pat aware, charge RN sharla aware, AND aura aware at this time. MD pat aware, charge RN sharla lyle, ADN aura aware at this time.

## 2020-01-11 NOTE — PROGRESS NOTE ADULT - ASSESSMENT
69 M with PMH of Parkinson's disease, gout, HTN, CVA ('98 and '16), DM presenting from assisted living s/p 2nd fall in two days.     #Repeat falls from unsteady gait likely from parkinsons progression and physical deconditioning.  - Negative orthostatics   - neurology evaluation appreciated, medications adjusted increased sinemet 25/100 to 1 1/2 tabs QID   - PT rehab eval --> patient will need STR  - X ray pelvis, left knee and L/S spine negative for fracture    #HO of CVA  - C/w ASA 81mg and Lipitor 10mg    #Parkinson's disease  - pt rehab recs noted  - C/w Carbidopa-Levodopa    - C/w entacapone     #Seizure disorder  - c/w keppra 500mg bid    #DM II  - FS qAC and qHS  Start insulin sliding scale if FS>180  - currently hypoglycemic not on any medications for DM  - encourage PO intake.    #Gout   - c/w allopurinol     #HTN   - c/w carvedilol and lisinopril    Diet: DASH/ CC  Prophylaxis: Lovenox SQ  Dispo: rehab, probably not on the weekend

## 2020-01-11 NOTE — CHART NOTE - NSCHARTNOTEFT_GEN_A_CORE
Patient complained of chest pressure to Dr. Nguyen. Dr. Nguyen suggested an EKG and Cardiac Enzymes. Patient also has labile Blood glucose levels so attending recommends starting on metformin 500 ER which is not available with pharmacy. Will assess the EKG for any acute changes and pass on to the morning team to decide on the Metformin Patient complained of chest pressure to Dr. Nguyen. Dr. Nguyen suggested an EKG and Cardiac Enzymes. Patient also has labile Blood glucose levels so attending recommends starting on metformin 500 ER which is not available with pharmacy. EKG did not show any acute ST elevation or depression and cardiac enzymes were negative. Will pass on to the morning team to decide on the Metformin

## 2020-01-11 NOTE — PROGRESS NOTE ADULT - SUBJECTIVE AND OBJECTIVE BOX
GENE ZAKIA  69y  Male  very confused, disoriented  says that its very difficult- while touching his chest- on further questioning admits having chest pain,  needs ekg, cardiac enzymes  fingersticks fluctuating - ?hypoglycemia as a reason for dizziness- consider metformin er 500 qd    INTERVAL EVENTS:    T(C): 35.9 (01-11-20 @ 16:00), Max: 36.6 (01-11-20 @ 05:10)  HR: 65 (01-11-20 @ 16:00) (62 - 77)  BP: 178/97 (01-11-20 @ 16:00) (159/86 - 178/97)  RR: 17 (01-11-20 @ 16:00) (17 - 18)  SpO2: 97% (01-11-20 @ 05:10) (97% - 98%)  Wt(kg): --Vital Signs Last 24 Hrs  T(C): 35.9 (11 Jan 2020 16:00), Max: 36.6 (11 Jan 2020 05:10)  T(F): 96.6 (11 Jan 2020 16:00), Max: 97.9 (11 Jan 2020 05:10)  HR: 65 (11 Jan 2020 16:00) (62 - 77)  BP: 178/97 (11 Jan 2020 16:00) (159/86 - 178/97)  BP(mean): --  RR: 17 (11 Jan 2020 16:00) (17 - 18)  SpO2: 97% (11 Jan 2020 05:10) (97% - 98%)    PHYSICAL EXAM:  GENERAL: resting comfortably, disoriented  NECK: Supple, No JVD, Normal thyroid  CHEST/LUNG: Clear; No crackles or wheezing  HEART: S1, S2, Regular rate and rhythm;   ABDOMEN: Soft, Nontender, Nondistended; Bowel sounds present  EXTREMITIES: No clubbing, cyanosis, or edema  SKIN: No rashes or lesions    LABS:                          14.7   7.83  )-----------( 140      ( 11 Jan 2020 07:58 )             43.3     01-11    144  |  106  |  9<L>  ----------------------------<  92  4.2   |  27  |  0.9    Ca    8.9      11 Jan 2020 07:58  Mg     1.8     01-11    TPro  5.5<L>  /  Alb  3.5  /  TBili  1.0  /  DBili  x   /  AST  20  /  ALT  <5  /  AlkPhos  63  01-11              allopurinol 200 milliGRAM(s) Oral daily  aspirin enteric coated 81 milliGRAM(s) Oral daily  atorvastatin 10 milliGRAM(s) Oral at bedtime  carbidopa/levodopa  25/100 1.5 Tablet(s) Oral four times a day  carvedilol 12.5 milliGRAM(s) Oral every 12 hours  clonazePAM  Tablet 0.5 milliGRAM(s) Oral at bedtime  enoxaparin Injectable 40 milliGRAM(s) SubCutaneous daily  entacapone 200 milliGRAM(s) Oral four times a day  levETIRAcetam 500 milliGRAM(s) Oral two times a day  lisinopril 40 milliGRAM(s) Oral daily  melatonin 5 milliGRAM(s) Oral at bedtime  traZODone 100 milliGRAM(s) Oral at bedtime      RADIOLOGY & ADDITIONAL TESTS:    case discussed with the resident  Available consult notes reviewed    Assessment and plan:     neuro noted  pt noted- short term rehab?  chest pain- get cardiac enzymes, ekg  give metformin to blunt hypoglycemia  d/c planning to SNF??

## 2020-01-12 LAB
ANION GAP SERPL CALC-SCNC: 12 MMOL/L — SIGNIFICANT CHANGE UP (ref 7–14)
BUN SERPL-MCNC: 8 MG/DL — LOW (ref 10–20)
CALCIUM SERPL-MCNC: 8.5 MG/DL — SIGNIFICANT CHANGE UP (ref 8.5–10.1)
CHLORIDE SERPL-SCNC: 108 MMOL/L — SIGNIFICANT CHANGE UP (ref 98–110)
CK MB CFR SERPL CALC: 1.1 NG/ML — SIGNIFICANT CHANGE UP (ref 0.6–6.3)
CK SERPL-CCNC: 60 U/L — SIGNIFICANT CHANGE UP (ref 0–225)
CO2 SERPL-SCNC: 24 MMOL/L — SIGNIFICANT CHANGE UP (ref 17–32)
CREAT SERPL-MCNC: 0.8 MG/DL — SIGNIFICANT CHANGE UP (ref 0.7–1.5)
GLUCOSE BLDC GLUCOMTR-MCNC: 138 MG/DL — HIGH (ref 70–99)
GLUCOSE BLDC GLUCOMTR-MCNC: 178 MG/DL — HIGH (ref 70–99)
GLUCOSE BLDC GLUCOMTR-MCNC: 202 MG/DL — HIGH (ref 70–99)
GLUCOSE BLDC GLUCOMTR-MCNC: 99 MG/DL — SIGNIFICANT CHANGE UP (ref 70–99)
GLUCOSE SERPL-MCNC: 72 MG/DL — SIGNIFICANT CHANGE UP (ref 70–99)
POTASSIUM SERPL-MCNC: 3.8 MMOL/L — SIGNIFICANT CHANGE UP (ref 3.5–5)
POTASSIUM SERPL-SCNC: 3.8 MMOL/L — SIGNIFICANT CHANGE UP (ref 3.5–5)
SODIUM SERPL-SCNC: 144 MMOL/L — SIGNIFICANT CHANGE UP (ref 135–146)
TROPONIN T SERPL-MCNC: <0.01 NG/ML — SIGNIFICANT CHANGE UP

## 2020-01-12 RX ORDER — METFORMIN HYDROCHLORIDE 850 MG/1
500 TABLET ORAL AT BEDTIME
Refills: 0 | Status: DISCONTINUED | OUTPATIENT
Start: 2020-01-12 | End: 2020-01-13

## 2020-01-12 RX ADMIN — ENTACAPONE 200 MILLIGRAM(S): 200 TABLET, FILM COATED ORAL at 23:41

## 2020-01-12 RX ADMIN — ATORVASTATIN CALCIUM 10 MILLIGRAM(S): 80 TABLET, FILM COATED ORAL at 21:19

## 2020-01-12 RX ADMIN — CARBIDOPA AND LEVODOPA 1.5 TABLET(S): 25; 100 TABLET ORAL at 05:18

## 2020-01-12 RX ADMIN — CARBIDOPA AND LEVODOPA 1.5 TABLET(S): 25; 100 TABLET ORAL at 11:00

## 2020-01-12 RX ADMIN — ENOXAPARIN SODIUM 40 MILLIGRAM(S): 100 INJECTION SUBCUTANEOUS at 11:01

## 2020-01-12 RX ADMIN — CARBIDOPA AND LEVODOPA 1.5 TABLET(S): 25; 100 TABLET ORAL at 23:40

## 2020-01-12 RX ADMIN — Medication 200 MILLIGRAM(S): at 11:01

## 2020-01-12 RX ADMIN — LEVETIRACETAM 500 MILLIGRAM(S): 250 TABLET, FILM COATED ORAL at 05:19

## 2020-01-12 RX ADMIN — ENTACAPONE 200 MILLIGRAM(S): 200 TABLET, FILM COATED ORAL at 11:00

## 2020-01-12 RX ADMIN — Medication 0.5 MILLIGRAM(S): at 21:23

## 2020-01-12 RX ADMIN — Medication 100 MILLIGRAM(S): at 21:19

## 2020-01-12 RX ADMIN — LISINOPRIL 40 MILLIGRAM(S): 2.5 TABLET ORAL at 05:18

## 2020-01-12 RX ADMIN — Medication 81 MILLIGRAM(S): at 11:01

## 2020-01-12 RX ADMIN — LEVETIRACETAM 500 MILLIGRAM(S): 250 TABLET, FILM COATED ORAL at 17:21

## 2020-01-12 RX ADMIN — CARBIDOPA AND LEVODOPA 1.5 TABLET(S): 25; 100 TABLET ORAL at 17:20

## 2020-01-12 RX ADMIN — ENTACAPONE 200 MILLIGRAM(S): 200 TABLET, FILM COATED ORAL at 17:22

## 2020-01-12 RX ADMIN — CARVEDILOL PHOSPHATE 12.5 MILLIGRAM(S): 80 CAPSULE, EXTENDED RELEASE ORAL at 05:18

## 2020-01-12 RX ADMIN — Medication 5 MILLIGRAM(S): at 21:19

## 2020-01-12 RX ADMIN — CARVEDILOL PHOSPHATE 12.5 MILLIGRAM(S): 80 CAPSULE, EXTENDED RELEASE ORAL at 17:22

## 2020-01-12 RX ADMIN — ENTACAPONE 200 MILLIGRAM(S): 200 TABLET, FILM COATED ORAL at 05:19

## 2020-01-12 NOTE — PROGRESS NOTE ADULT - SUBJECTIVE AND OBJECTIVE BOX
SUBJECTIVE:  Patient is a 69y old Male who presents with a chief complaint of repeat falls (11 Jan 2020 22:27)     Currently admitted to medicine with the primary diagnosis of Frequent falls     Today is hospital day 3d. This morning he is resting comfortably in bed and reports no new issues or overnight events.   Patient denies Headache, Chest pain, Abdominal Pain or discomfort.  Patient reports urinating and stooling appropriately.    PAST MEDICAL & SURGICAL HISTORY  Gout  Diabetes mellitus  HTN (hypertension)  CVA (cerebral vascular accident)  Parkinson disease  No significant past surgical history    SOCIAL HISTORY:  Negative for smoking/alcohol/drug use.     ALLERGIES:  cefaclor (Unknown)  penicillin (Unknown)  sulfa drugs (Unknown)    MEDICATIONS:  STANDING MEDICATIONS  allopurinol 200 milliGRAM(s) Oral daily  aspirin enteric coated 81 milliGRAM(s) Oral daily  atorvastatin 10 milliGRAM(s) Oral at bedtime  carbidopa/levodopa  25/100 1.5 Tablet(s) Oral four times a day  carvedilol 12.5 milliGRAM(s) Oral every 12 hours  clonazePAM  Tablet 0.5 milliGRAM(s) Oral at bedtime  enoxaparin Injectable 40 milliGRAM(s) SubCutaneous daily  entacapone 200 milliGRAM(s) Oral four times a day  levETIRAcetam 500 milliGRAM(s) Oral two times a day  lisinopril 40 milliGRAM(s) Oral daily  melatonin 5 milliGRAM(s) Oral at bedtime  traZODone 100 milliGRAM(s) Oral at bedtime    PRN MEDICATIONS    VITALS:   T(F): 97.2  HR: 62 (62 - 75)  BP: 117/70 (117/70 - 178/97)  RR: 18 (17 - 19)  SpO2: --    LABS:                        14.7   7.83  )-----------( 140      ( 11 Jan 2020 07:58 )             43.3     01-12    144  |  108  |  8<L>  ----------------------------<  72  3.8   |  24  |  0.8    Ca    8.5      12 Jan 2020 05:45  Mg     1.8     01-11    TPro  5.5<L>  /  Alb  3.5  /  TBili  1.0  /  DBili  x   /  AST  20  /  ALT  <5  /  AlkPhos  63  01-11    Creatine Kinase, Serum: 60 U/L (01-12-20 @ 01:02)  Troponin T, Serum: <0.01 ng/mL (01-12-20 @ 01:02)      CARDIAC MARKERS ( 12 Jan 2020 01:02 )  x     / <0.01 ng/mL / 60 U/L / x     / 1.1 ng/mL      Troponin T, Serum: <0.01 ng/mL (01-12-20 @ 01:02)      RADIOLOGY:    PHYSICAL EXAM:  GEN: In no acute distress. Patient is awake in bed able to have a conversation.  LUNGS: CTABL, Symmetrical inspiration, no increased work of breathing  HEART: +S1,S2, regular rate and rhythm, No murmurs, Rubs, Gallops appreciated  ABD: sllightly obese habitus, Bowel Sounds Present, Soft, non tender, non distended, no guarding, no rebound.   EXT: 2+ peripheral Pulses, no clubbing, no cyanosis, no Edema.   NEURO: Alert and Oriented X3. No focal deficits Appreciated. Cranial Nerves 2-12 Grossly intact.

## 2020-01-12 NOTE — PROGRESS NOTE ADULT - ASSESSMENT
69 M with PMH of Parkinson's disease, gout, HTN, CVA ('98 and '16), DM presenting from assisted living s/p 2nd fall in two days.     #Repeat falls from unsteady gait likely from parkinson's progression and physical deconditioning.  - Negative orthostatics   - neurology evaluation appreciated, medications adjusted increased sinemet 25/100 to 1 1/2 tabs QID   - PT rehab eval --> patient will need STR  - X ray pelvis, left knee and L/S spine negative for fracture    #HO of CVA - Continue with ASA 81mg and Lipitor 10mg  #Parkinson's disease - pt rehab recs noted - Continue with Carbidopa-Levodopa as above, Entacapone    #Seizure disorder - Continue with keppra 500mg bid  #DM II - Encourage PO intake - Hypoglycemin on no meds - Monitor FS qHS/AC   #Gout - Continue with allopurinol   #HTN - Continue with carvedilol and lisinopril    Diet: DASH/ CC  DVT ppx: Lovenox SQ  GI ppx: Not indictaed  Dispo: rehab, probably not on the weekend

## 2020-01-12 NOTE — PROGRESS NOTE ADULT - SUBJECTIVE AND OBJECTIVE BOX
ZAKIA MILLS  69y  Male  admitted for frequent falls  very disoriented, very unsteady gait- is sitting in the gilliam under supervision with posey  no more c/o chest discomfort  troponin-  fingersticks elevated    INTERVAL EVENTS:    T(C): 36.2 (01-12-20 @ 07:30), Max: 36.2 (01-12-20 @ 07:30)  HR: 62 (01-12-20 @ 07:30) (62 - 75)  BP: 117/70 (01-12-20 @ 07:30) (117/70 - 178/97)  RR: 18 (01-12-20 @ 07:30) (17 - 19)  SpO2: --  Wt(kg): --Vital Signs Last 24 Hrs  T(C): 36.2 (12 Jan 2020 07:30), Max: 36.2 (12 Jan 2020 07:30)  T(F): 97.2 (12 Jan 2020 07:30), Max: 97.2 (12 Jan 2020 07:30)  HR: 62 (12 Jan 2020 07:30) (62 - 75)  BP: 117/70 (12 Jan 2020 07:30) (117/70 - 178/97)  BP(mean): --  RR: 18 (12 Jan 2020 07:30) (17 - 19)  SpO2: --    PHYSICAL EXAM:  GENERAL: resting comfortably  NECK: Supple, No JVD, Normal thyroid  CHEST/LUNG: Clear; No crackles or wheezing  HEART: S1, S2, Regular rate and rhythm;   ABDOMEN: Soft, Nontender, Nondistended; Bowel sounds present  EXTREMITIES: No clubbing, cyanosis, or edema  SKIN: No rashes or lesions    LABS:                          14.7   7.83  )-----------( 140      ( 11 Jan 2020 07:58 )             43.3     01-12    144  |  108  |  8<L>  ----------------------------<  72  3.8   |  24  |  0.8    Ca    8.5      12 Jan 2020 05:45  Mg     1.8     01-11    TPro  5.5<L>  /  Alb  3.5  /  TBili  1.0  /  DBili  x   /  AST  20  /  ALT  <5  /  AlkPhos  63  01-11              allopurinol 200 milliGRAM(s) Oral daily  aspirin enteric coated 81 milliGRAM(s) Oral daily  atorvastatin 10 milliGRAM(s) Oral at bedtime  carbidopa/levodopa  25/100 1.5 Tablet(s) Oral four times a day  carvedilol 12.5 milliGRAM(s) Oral every 12 hours  clonazePAM  Tablet 0.5 milliGRAM(s) Oral at bedtime  enoxaparin Injectable 40 milliGRAM(s) SubCutaneous daily  entacapone 200 milliGRAM(s) Oral four times a day  levETIRAcetam 500 milliGRAM(s) Oral two times a day  lisinopril 40 milliGRAM(s) Oral daily  melatonin 5 milliGRAM(s) Oral at bedtime  traZODone 100 milliGRAM(s) Oral at bedtime      RADIOLOGY & ADDITIONAL TESTS:    case discussed with the resident  Available consult notes reviewed    Assessment and plan:     would give metformin to avoid hypoglycemia  not safe for d/c to [facility]ending sale to [facility]- dementia and gait ataxia rapidly worsening  needs SNF d/c- [facility] first choice

## 2020-01-13 ENCOUNTER — TRANSCRIPTION ENCOUNTER (OUTPATIENT)
Age: 70
End: 2020-01-13

## 2020-01-13 VITALS — SYSTOLIC BLOOD PRESSURE: 136 MMHG | DIASTOLIC BLOOD PRESSURE: 85 MMHG | HEART RATE: 81 BPM

## 2020-01-13 LAB
ANION GAP SERPL CALC-SCNC: 12 MMOL/L — SIGNIFICANT CHANGE UP (ref 7–14)
BASOPHILS # BLD AUTO: 0.03 K/UL — SIGNIFICANT CHANGE UP (ref 0–0.2)
BASOPHILS NFR BLD AUTO: 0.4 % — SIGNIFICANT CHANGE UP (ref 0–1)
BUN SERPL-MCNC: 9 MG/DL — LOW (ref 10–20)
CALCIUM SERPL-MCNC: 8.5 MG/DL — SIGNIFICANT CHANGE UP (ref 8.5–10.1)
CHLORIDE SERPL-SCNC: 108 MMOL/L — SIGNIFICANT CHANGE UP (ref 98–110)
CO2 SERPL-SCNC: 23 MMOL/L — SIGNIFICANT CHANGE UP (ref 17–32)
CREAT SERPL-MCNC: 0.9 MG/DL — SIGNIFICANT CHANGE UP (ref 0.7–1.5)
EOSINOPHIL # BLD AUTO: 0.11 K/UL — SIGNIFICANT CHANGE UP (ref 0–0.7)
EOSINOPHIL NFR BLD AUTO: 1.4 % — SIGNIFICANT CHANGE UP (ref 0–8)
GLUCOSE BLDC GLUCOMTR-MCNC: 124 MG/DL — HIGH (ref 70–99)
GLUCOSE BLDC GLUCOMTR-MCNC: 157 MG/DL — HIGH (ref 70–99)
GLUCOSE BLDC GLUCOMTR-MCNC: 94 MG/DL — SIGNIFICANT CHANGE UP (ref 70–99)
GLUCOSE SERPL-MCNC: 83 MG/DL — SIGNIFICANT CHANGE UP (ref 70–99)
HCT VFR BLD CALC: 41.6 % — LOW (ref 42–52)
HGB BLD-MCNC: 14.2 G/DL — SIGNIFICANT CHANGE UP (ref 14–18)
IMM GRANULOCYTES NFR BLD AUTO: 0.3 % — SIGNIFICANT CHANGE UP (ref 0.1–0.3)
LYMPHOCYTES # BLD AUTO: 2.4 K/UL — SIGNIFICANT CHANGE UP (ref 1.2–3.4)
LYMPHOCYTES # BLD AUTO: 30.1 % — SIGNIFICANT CHANGE UP (ref 20.5–51.1)
MCHC RBC-ENTMCNC: 33.5 PG — HIGH (ref 27–31)
MCHC RBC-ENTMCNC: 34.1 G/DL — SIGNIFICANT CHANGE UP (ref 32–37)
MCV RBC AUTO: 98.1 FL — HIGH (ref 80–94)
MONOCYTES # BLD AUTO: 0.65 K/UL — HIGH (ref 0.1–0.6)
MONOCYTES NFR BLD AUTO: 8.2 % — SIGNIFICANT CHANGE UP (ref 1.7–9.3)
NEUTROPHILS # BLD AUTO: 4.76 K/UL — SIGNIFICANT CHANGE UP (ref 1.4–6.5)
NEUTROPHILS NFR BLD AUTO: 59.6 % — SIGNIFICANT CHANGE UP (ref 42.2–75.2)
NRBC # BLD: 0 /100 WBCS — SIGNIFICANT CHANGE UP (ref 0–0)
PLATELET # BLD AUTO: 137 K/UL — SIGNIFICANT CHANGE UP (ref 130–400)
POTASSIUM SERPL-MCNC: 3.8 MMOL/L — SIGNIFICANT CHANGE UP (ref 3.5–5)
POTASSIUM SERPL-SCNC: 3.8 MMOL/L — SIGNIFICANT CHANGE UP (ref 3.5–5)
RBC # BLD: 4.24 M/UL — LOW (ref 4.7–6.1)
RBC # FLD: 12.1 % — SIGNIFICANT CHANGE UP (ref 11.5–14.5)
SODIUM SERPL-SCNC: 143 MMOL/L — SIGNIFICANT CHANGE UP (ref 135–146)
WBC # BLD: 7.97 K/UL — SIGNIFICANT CHANGE UP (ref 4.8–10.8)
WBC # FLD AUTO: 7.97 K/UL — SIGNIFICANT CHANGE UP (ref 4.8–10.8)

## 2020-01-13 RX ORDER — METFORMIN HYDROCHLORIDE 850 MG/1
1 TABLET ORAL
Qty: 0 | Refills: 0 | DISCHARGE
Start: 2020-01-13

## 2020-01-13 RX ORDER — TRAZODONE HCL 50 MG
1 TABLET ORAL
Qty: 0 | Refills: 0 | DISCHARGE

## 2020-01-13 RX ORDER — LEVETIRACETAM 250 MG/1
1 TABLET, FILM COATED ORAL
Qty: 0 | Refills: 0 | DISCHARGE
Start: 2020-01-13

## 2020-01-13 RX ORDER — CARBIDOPA AND LEVODOPA 25; 100 MG/1; MG/1
1.5 TABLET ORAL
Qty: 0 | Refills: 0 | DISCHARGE
Start: 2020-01-13

## 2020-01-13 RX ORDER — TRAZODONE HCL 50 MG
1 TABLET ORAL
Qty: 0 | Refills: 0 | DISCHARGE
Start: 2020-01-13

## 2020-01-13 RX ORDER — LISINOPRIL 2.5 MG/1
1 TABLET ORAL
Qty: 0 | Refills: 0 | DISCHARGE
Start: 2020-01-13

## 2020-01-13 RX ADMIN — LEVETIRACETAM 500 MILLIGRAM(S): 250 TABLET, FILM COATED ORAL at 05:12

## 2020-01-13 RX ADMIN — CARVEDILOL PHOSPHATE 12.5 MILLIGRAM(S): 80 CAPSULE, EXTENDED RELEASE ORAL at 18:31

## 2020-01-13 RX ADMIN — CARBIDOPA AND LEVODOPA 1.5 TABLET(S): 25; 100 TABLET ORAL at 18:30

## 2020-01-13 RX ADMIN — LEVETIRACETAM 500 MILLIGRAM(S): 250 TABLET, FILM COATED ORAL at 18:31

## 2020-01-13 RX ADMIN — CARBIDOPA AND LEVODOPA 1.5 TABLET(S): 25; 100 TABLET ORAL at 11:25

## 2020-01-13 RX ADMIN — LISINOPRIL 40 MILLIGRAM(S): 2.5 TABLET ORAL at 05:12

## 2020-01-13 RX ADMIN — CARBIDOPA AND LEVODOPA 1.5 TABLET(S): 25; 100 TABLET ORAL at 05:12

## 2020-01-13 RX ADMIN — Medication 200 MILLIGRAM(S): at 11:26

## 2020-01-13 RX ADMIN — CARVEDILOL PHOSPHATE 12.5 MILLIGRAM(S): 80 CAPSULE, EXTENDED RELEASE ORAL at 05:12

## 2020-01-13 RX ADMIN — ENOXAPARIN SODIUM 40 MILLIGRAM(S): 100 INJECTION SUBCUTANEOUS at 11:27

## 2020-01-13 RX ADMIN — ENTACAPONE 200 MILLIGRAM(S): 200 TABLET, FILM COATED ORAL at 18:30

## 2020-01-13 RX ADMIN — ENTACAPONE 200 MILLIGRAM(S): 200 TABLET, FILM COATED ORAL at 05:12

## 2020-01-13 RX ADMIN — ENTACAPONE 200 MILLIGRAM(S): 200 TABLET, FILM COATED ORAL at 11:25

## 2020-01-13 RX ADMIN — Medication 81 MILLIGRAM(S): at 11:26

## 2020-01-13 NOTE — PROGRESS NOTE ADULT - SUBJECTIVE AND OBJECTIVE BOX
ZAKIA MILLS  69y  Male    extremely confused, disoriented  unsteady gait  on chair w posey  fingersticks stable  needs metformin ER- to alleviate hypoglycemia  bp better  INTERVAL EVENTS:    T(C): 36.2 (01-13-20 @ 07:30), Max: 36.8 (01-12-20 @ 15:30)  HR: 66 (01-13-20 @ 07:30) (66 - 80)  BP: 132/67 (01-13-20 @ 07:30) (118/70 - 140/78)  RR: 18 (01-13-20 @ 07:30) (14 - 19)  SpO2: --  Wt(kg): --Vital Signs Last 24 Hrs  T(C): 36.2 (13 Jan 2020 07:30), Max: 36.8 (12 Jan 2020 15:30)  T(F): 97.2 (13 Jan 2020 07:30), Max: 98.3 (12 Jan 2020 15:30)  HR: 66 (13 Jan 2020 07:30) (66 - 80)  BP: 132/67 (13 Jan 2020 07:30) (118/70 - 140/78)  BP(mean): --  RR: 18 (13 Jan 2020 07:30) (14 - 19)  SpO2: --    PHYSICAL EXAM:  GENERAL: resting comfortably  NECK: Supple, No JVD, Normal thyroid  CHEST/LUNG: Clear; No crackles or wheezing  HEART: S1, S2, Regular rate and rhythm;   ABDOMEN: Soft, Nontender, Nondistended; Bowel sounds present  EXTREMITIES: No clubbing, cyanosis, or edema  SKIN: No rashes or lesions    LABS:                          14.2   7.97  )-----------( 137      ( 13 Jan 2020 05:52 )             41.6     01-13    143  |  108  |  9<L>  ----------------------------<  83  3.8   |  23  |  0.9    Ca    8.5      13 Jan 2020 05:52                allopurinol 200 milliGRAM(s) Oral daily  aspirin enteric coated 81 milliGRAM(s) Oral daily  atorvastatin 10 milliGRAM(s) Oral at bedtime  carbidopa/levodopa  25/100 1.5 Tablet(s) Oral four times a day  carvedilol 12.5 milliGRAM(s) Oral every 12 hours  clonazePAM  Tablet 0.5 milliGRAM(s) Oral at bedtime  enoxaparin Injectable 40 milliGRAM(s) SubCutaneous daily  entacapone 200 milliGRAM(s) Oral four times a day  levETIRAcetam 500 milliGRAM(s) Oral two times a day  lisinopril 40 milliGRAM(s) Oral daily  melatonin 5 milliGRAM(s) Oral at bedtime  metFORMIN Extended-Release 500 milliGRAM(s) Oral at bedtime  traZODone 100 milliGRAM(s) Oral at bedtime      RADIOLOGY & ADDITIONAL TESTS:    case discussed with the resident  Available consult notes reviewed    Assessment and plan:       increase in sinemet did not help much  not safe in current [facility]- [place]- [facility] is next of kin and healthcare proxy- [phone]

## 2020-01-13 NOTE — DISCHARGE NOTE PROVIDER - PROVIDER TOKENS
PROVIDER:[TOKEN:[[id number]],FOLLOWUP:[Routine]],PROVIDER:[TOKEN:[[id number]],FOLLOWUP:[2 weeks]]

## 2020-01-13 NOTE — DISCHARGE NOTE PROVIDER - CARE PROVIDER_API CALL
Wendy Mclean)  Medicine  [address] [place] [address]  Phone: [phone]  Fax: [phone]  Follow Up Time: Routine    Rosa Lynn)  Neurology  [address] [place] [address]  Phone: [phone]  Fax: [phone]  Follow Up Time: 2 weeks

## 2020-01-13 NOTE — DISCHARGE NOTE PROVIDER - NSDCFUSCHEDAPPT_GEN_ALL_CORE_FT
ZAKIA MILLS ; 02/12/2020 ; [facility] [address] [facility] ZAKIA MILLS ; 02/12/2020 ; [facility] Neu[facility] [address] [facility]

## 2020-01-13 NOTE — DISCHARGE NOTE PROVIDER - HOSPITAL COURSE
69 M with PMH of Parkinson's disease, gout, HTN, CVA ('98 and '16), DM presenting from assisted living s/p 2nd fall in two days.         #Repeat falls from unsteady gait likely from parkinson's progression and physical deconditioning.    - Negative orthostatics     - neurology evaluation appreciated, medications adjusted increased sinemet 25/100 to 1 1/2 tabs QID     - PT rehab eval --> patient will need STR    - X ray pelvis, left knee and L/S spine negative for fracture        #HO of CVA - Continue with ASA 81mg and Lipitor 10mg    #Parkinson's disease - pt rehab recs noted - Continue with Carbidopa-Levodopa as above, Entacapone      #Seizure disorder - Continue with keppra 500mg bid    #DM II - Encourage PO intake - Hypoglycemin on no meds - Monitor FS qHS/AC     #Gout - Continue with allopurinol     #HTN - Continue with carvedilol and lisinopril        Diet: DASH/ CC    DVT ppx: Lovenox SQ    GI ppx: Not indictaed    Dispo: [facility]

## 2020-01-13 NOTE — DISCHARGE NOTE PROVIDER - CARE PROVIDERS DIRECT ADDRESSES
,DirectAddress_Unknown,[email or web address]

## 2020-01-13 NOTE — DISCHARGE NOTE PROVIDER - NSDCMRMEDTOKEN_GEN_ALL_CORE_FT
acetaminophen 325 mg oral tablet: 2 tab(s) orally every 6 hours, As Needed for pain  allopurinol 100 mg oral tablet: 2 tab(s) orally once a day  aspirin 81 mg oral delayed release tablet: 1 tab(s) orally once a day  atorvastatin 10 mg oral tablet: 1 tab(s) orally once a day  carbidopa-levodopa 25 mg-100 mg oral tablet: 1.5 tab(s) orally 4 times a day  carvedilol 12.5 mg oral tablet: 1 tab(s) orally every 12 hours  clonazePAM 0.5 mg oral tablet: 1 tab(s) orally once a day (at bedtime)  entacapone 200 mg oral tablet: 1 tab(s) orally 4 times a day with sinemet  levETIRAcetam 500 mg oral tablet: 1 tab(s) orally 2 times a day  lisinopril 40 mg oral tablet: 1 tab(s) orally once a day  melatonin 5 mg oral tablet: 1 tab(s) orally once a day (at bedtime), As needed, Insomnia  metFORMIN 500 mg oral tablet, extended release: 1 tab(s) orally once a day (at bedtime)  rivastigmine 9.5 mg/24 hr transdermal film, extended release: 1 patch transdermal every 24 hours  traZODone 100 mg oral tablet: 1 tab(s) orally once a day (at bedtime)

## 2020-01-13 NOTE — CDI QUERY NOTE - NSCDIOTHERTXTBX_GEN_ALL_CORE_HH
Documentation:  ** ED: Patient was sent for admission by his PCP Dr. Nguyen 2/2 deconditioning and for inpatient neuro eval  ** PN 1/9: dizziness, gait ataxia, frequent falls, deconditioning. Needs neuro eval   ** PN 1/12:Repeat falls from unsteady gait likely from parkinson's progression and physical deconditioning.  ** Neurology 1/10: Impression: most likely progression of of PD.      Plan:      - increase Sinemet 25/100 to 1 and 1/2 tab po qid      - cont Comtan 200mg po qid with Sinemet                                                       Based on your clinical evaluation of the patient, can you please clarify the underlying etiology of repeated fall  • Debility of old age  • Worsening Parkinson's.  • Both of the above	  • Other (please specify).  • Unable to determine.

## 2020-01-13 NOTE — DISCHARGE NOTE PROVIDER - NSDCCPCAREPLAN_GEN_ALL_CORE_FT
PRINCIPAL DISCHARGE DIAGNOSIS  Diagnosis: Frequent falls  Assessment and Plan of Treatment: The falls that you have been experiencing are likely due to worsening of your Parkinson's disease. Your trauma workup was negative. Please continue to take your medications as prescribed. You will be discharged to a skilled nursing facility to aid in your rehab and care.      SECONDARY DISCHARGE DIAGNOSES  Diagnosis: History of Parkinson's disease  Assessment and Plan of Treatment: Please note that the dosage of your sinemet as been increased. Please follow up with your neurologist for further managment.

## 2020-01-16 DIAGNOSIS — G40.909 EPILEPSY, UNSPECIFIED, NOT INTRACTABLE, WITHOUT STATUS EPILEPTICUS: ICD-10-CM

## 2020-01-16 DIAGNOSIS — F02.80 DEMENTIA IN OTHER DISEASES CLASSIFIED ELSEWHERE, UNSPECIFIED SEVERITY, WITHOUT BEHAVIORAL DISTURBANCE, PSYCHOTIC DISTURBANCE, MOOD DISTURBANCE, AND ANXIETY: ICD-10-CM

## 2020-01-16 DIAGNOSIS — M10.9 GOUT, UNSPECIFIED: ICD-10-CM

## 2020-01-16 DIAGNOSIS — E11.649 TYPE 2 DIABETES MELLITUS WITH HYPOGLYCEMIA WITHOUT COMA: ICD-10-CM

## 2020-01-16 DIAGNOSIS — I10 ESSENTIAL (PRIMARY) HYPERTENSION: ICD-10-CM

## 2020-01-16 DIAGNOSIS — G20 PARKINSON'S DISEASE: ICD-10-CM

## 2020-02-12 ENCOUNTER — APPOINTMENT (OUTPATIENT)
Dept: NEUROLOGY | Facility: CLINIC | Age: 70
End: 2020-02-12

## 2020-02-12 NOTE — ED ADULT NURSE NOTE - PMH
CVA (cerebral vascular accident)    Diabetes mellitus    Gout    HTN (hypertension)    Parkinson disease
The patient is a 19y Male complaining of finger pain

## 2020-05-12 ENCOUNTER — EMERGENCY (EMERGENCY)
Facility: HOSPITAL | Age: 70
LOS: 0 days | Discharge: HOME | End: 2020-05-13
Attending: EMERGENCY MEDICINE | Admitting: EMERGENCY MEDICINE
Payer: MEDICARE

## 2020-05-12 VITALS
SYSTOLIC BLOOD PRESSURE: 139 MMHG | RESPIRATION RATE: 20 BRPM | OXYGEN SATURATION: 97 % | DIASTOLIC BLOOD PRESSURE: 76 MMHG | HEART RATE: 69 BPM | TEMPERATURE: 97 F

## 2020-05-12 VITALS
HEART RATE: 72 BPM | DIASTOLIC BLOOD PRESSURE: 75 MMHG | TEMPERATURE: 99 F | OXYGEN SATURATION: 98 % | SYSTOLIC BLOOD PRESSURE: 132 MMHG | RESPIRATION RATE: 18 BRPM
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
## 2020-05-12 DIAGNOSIS — Z88.0 ALLERGY STATUS TO PENICILLIN: ICD-10-CM

## 2020-05-12 DIAGNOSIS — S00.212A ABRASION OF LEFT EYELID AND PERIOCULAR AREA, INITIAL ENCOUNTER: ICD-10-CM

## 2020-05-12 DIAGNOSIS — E11.9 TYPE 2 DIABETES MELLITUS WITHOUT COMPLICATIONS: ICD-10-CM

## 2020-05-12 DIAGNOSIS — Z23 ENCOUNTER FOR IMMUNIZATION: ICD-10-CM

## 2020-05-12 DIAGNOSIS — I10 ESSENTIAL (PRIMARY) HYPERTENSION: ICD-10-CM
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
## 2020-05-12 DIAGNOSIS — Z88.2 ALLERGY STATUS TO SULFONAMIDES: ICD-10-CM
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
## 2020-05-12 DIAGNOSIS — Z91.09 OTHER ALLERGY STATUS, OTHER THAN TO DRUGS AND BIOLOGICAL SUBSTANCES: ICD-10-CM

## 2020-05-12 PROCEDURE — 99284 EMERGENCY DEPT VISIT MOD MDM: CPT

## 2020-05-12 PROCEDURE — 72125 CT NECK SPINE W/O DYE: CPT | Mod: 26

## 2020-05-12 PROCEDURE — 70450 CT HEAD/BRAIN W/O DYE: CPT | Mod: 26

## 2020-05-12 RX ORDER — TETANUS TOXOID, REDUCED DIPHTHERIA TOXOID AND ACELLULAR PERTUSSIS VACCINE, ADSORBED 5; 2.5; 8; 8; 2.5 [IU]/.5ML; [IU]/.5ML; UG/.5ML; UG/.5ML; UG/.5ML
0.5 SUSPENSION INTRAMUSCULAR ONCE
Refills: 0 | Status: COMPLETED | OUTPATIENT
Start: 2020-05-12 | End: 2020-05-12

## 2020-05-12 RX ADMIN — TETANUS TOXOID, REDUCED DIPHTHERIA TOXOID AND ACELLULAR PERTUSSIS VACCINE, ADSORBED 0.5 MILLILITER(S): 5; 2.5; 8; 8; 2.5 SUSPENSION INTRAMUSCULAR at 22:10

## 2020-05-12 NOTE — ED PROVIDER NOTE - NSFOLLOWUPINSTRUCTIONS_ED_ALL_ED_FT
Fall Prevention in the Home    Falls can cause injuries. They can happen to people of all ages. There are many things you can do to make your home safe and to help prevent falls.     WHAT CAN I DO ON THE OUTSIDE OF MY HOME?  Regularly fix the edges of walkways and driveways and fix any cracks.  Remove anything that might make you trip as you walk through a door, such as a raised step or threshold.  Trim any bushes or trees on the path to your home.  Use bright outdoor lighting.  Clear any walking paths of anything that might make someone trip, such as rocks or tools.  Regularly check to see if handrails are loose or broken. Make sure that both sides of any steps have handrails.  Any raised decks and porches should have guardrails on the edges.  Have any leaves, snow, or ice cleared regularly.  Use sand or salt on walking paths during winter.  Clean up any spills in your garage right away. This includes oil or grease spills.    WHAT CAN I DO IN THE BATHROOM?  Use night lights.  Install grab bars by the toilet and in the tub and shower. Do not use towel bars as grab bars.  Use non-skid mats or decals in the tub or shower.  If you need to sit down in the shower, use a plastic, non-slip stool.  Keep the floor dry. Clean up any water that spills on the floor as soon as it happens.  Remove soap buildup in the tub or shower regularly.  Attach bath mats securely with double-sided non-slip rug tape.  Do not have throw rugs and other things on the floor that can make you trip.    WHAT CAN I DO IN THE BEDROOM?  Use night lights.  Make sure that you have a light by your bed that is easy to reach.  Do not use any sheets or blankets that are too big for your bed. They should not hang down onto the floor.  Have a firm chair that has side arms. You can use this for support while you get dressed.  Do not have throw rugs and other things on the floor that can make you trip.    WHAT CAN I DO IN THE KITCHEN?  Clean up any spills right away.  Avoid walking on wet floors.  Keep items that you use a lot in easy-to-reach places.  If you need to reach something above you, use a strong step stool that has a grab bar.  Keep electrical cords out of the way.  Do not use floor polish or wax that makes floors slippery. If you must use wax, use non-skid floor wax.  Do not have throw rugs and other things on the floor that can make you trip.    WHAT CAN I DO WITH MY STAIRS?  Do not leave any items on the stairs.  Make sure that there are handrails on both sides of the stairs and use them. Fix handrails that are broken or loose. Make sure that handrails are as long as the stairways.  Check any carpeting to make sure that it is firmly attached to the stairs. Fix any carpet that is loose or worn.  Avoid having throw rugs at the top or bottom of the stairs. If you do have throw rugs, attach them to the floor with carpet tape.  Make sure that you have a light switch at the top of the stairs and the bottom of the stairs. If you do not have them, ask someone to add them for you.    WHAT ELSE CAN I DO TO HELP PREVENT FALLS?  Wear shoes that:  Do not have high heels.  Have rubber bottoms.  Are comfortable and fit you well.  Are closed at the toe. Do not wear sandals.  If you use a stepladder:  Make sure that it is fully opened. Do not climb a closed stepladder.  Make sure that both sides of the stepladder are locked into place.  Ask someone to hold it for you, if possible.  Clearly christina and make sure that you can see:  Any grab bars or handrails.  First and last steps.  Where the edge of each step is.  Use tools that help you move around (mobility aids) if they are needed. These include:  Canes.  Walkers.  Scooters.  Crutches.  Turn on the lights when you go into a dark area. Replace any light bulbs as soon as they burn out.  Set up your furniture so you have a clear path. Avoid moving your furniture around.  If any of your floors are uneven, fix them.  If there are any pets around you, be aware of where they are.  Review your medicines with your doctor. Some medicines can make you feel dizzy. This can increase your chance of falling.    Ask your doctor what other things that you can do to help prevent falls.    ADDITIONAL NOTES AND INSTRUCTIONS    Please follow up with your Primary MD in 24-48 hr.  Seek immediate medical care for any new/worsening signs or symptoms.       Abrasion    An abrasion is a cut or a scrape on the outer surface of the skin. An abrasion does not go through all the layers of the skin. It is important to care for your abrasion properly to prevent infection.    What are the causes?  This condition is caused by falling on or gliding across the ground or another surface. When your skin rubs on something, the outer and inner layers of skin may rub off.    What are the signs or symptoms?  The main symptom of this condition is a cut or a scrape. The scrape may be bleeding, or it may appear red or pink. If the abrasion was caused by a fall, there may be a bruise under the cut or scrape.    How is this diagnosed?  An abrasion is diagnosed with a physical exam.    How is this treated?  Treatment for this condition depends on how large and deep the abrasion is. In most cases:    Your abrasion will be cleaned with water and mild soap. This is done to remove any dirt or debris (such as particles of glass or rock) that may be stuck in the wound.  An antibiotic ointment may be applied to the abrasion to help prevent infection.  A bandage (dressing) may be placed on the abrasion to keep it clean.    You may also need a tetanus shot.    Follow these instructions at home:  Medicines     Take or apply over-the-counter and prescription medicines only as told by your health care provider.  If you were prescribed an antibiotic medicine, apply it as told by your health care provider.  Wound care     Clean the wound 2–3 times a day, or as directed by your health care provider. To do this, wash the wound with mild soap and water, rinse off the soap, and pat the wound dry with a clean towel. Do not rub the wound.  Keep the dressing clean and dry as told by your health care provider.  There are many different ways to close and cover a wound. Follow instructions from your health care provider about:    Caring for your wound.  Changing and removing your dressing. You may have to change your dressing one or more times a day, or as directed by your health care provider.    Check your wound every day for signs of infection. Check for:    Redness, particularly a red streak that spreads out from the wound.  Swelling or increased pain.  Warmth.  Fluid, pus, or a bad smell.    If directed, put ice on the injured area to reduce pain and swelling:    Put ice in a plastic bag.   Place a towel between your skin and the bag.   Leave the ice on for 20 minutes, 2–3 times a day.    General instructions     Do not take baths, swim, or use a hot tub until your health care provider says it is okay to do so.  If possible, raise (elevate) the injured area above the level of your heart while you are sitting or lying down. This will reduce pain and swelling.  Keep all follow-up visits as directed by your health care provider. This is important.  Contact a health care provider if:  You received a tetanus shot, and you have swelling, severe pain, redness, or bleeding at the injection site.  Your pain is not controlled with medicine.  You have redness, swelling, or more pain at the site of your wound.  Get help right away if:  You have a red streak spreading away from your wound.  You have a fever.  You have fluid, blood, or pus coming from your wound.  You notice a bad smell coming from your wound or your dressing.  Summary  An abrasion is a cut or a scrape on the outer surface of the skin. An abrasion does not go through all the layers of the skin.  Care for your abrasion properly to prevent infection.  Clean the wound with mild soap and water 2–3 times a day. Follow instructions from your health care provider about taking medicines and changing your bandage (dressing).  Contact your health care provider if you have redness, swelling or more pain in the wound area.  Get help right away if you have a fever or if you have fluid, blood, pus, a bad smell, or a red streak coming from the wound.  This information is not intended to replace advice given to you by your health care provider. Make sure you discuss any questions you have with your health care provider.

## 2020-05-12 NOTE — ED ADULT TRIAGE NOTE - CHIEF COMPLAINT QUOTE
Patient BIBEMS from adult living facility. Patient s/p trip and fall. denies loc , denies blood thinner. Patient with lac to right eyebrow.

## 2020-05-12 NOTE — ED PROVIDER NOTE - PATIENT PORTAL LINK FT
You can access the FollowMyHealth Patient Portal offered by [facility] by registering at the following website: [email or web address]. By joining [employer]’s FollowMyHealth portal, you will also be able to view your health information using other applications (apps) compatible with our system.

## 2020-05-12 NOTE — ED PROVIDER NOTE - NS ED ROS FT
Constitutional: no fever, chills, no recent weight loss, change in appetite or malaise  Cardiac: No chest pain, SOB or edema.  Respiratory: No cough or respiratory distress  GI: No nausea, vomiting, diarrhea or abdominal pain.  : No dysuria, frequency, urgency or hematuria  MS: see HPI  Neuro: see HPI  Skin: see HPI  Except as documented in the HPI, all other systems are negative.

## 2020-05-12 NOTE — ED ADULT NURSE NOTE - OBJECTIVE STATEMENT
Pt found resting in bed, eyes open.  NAD.  Reports that pt tripped and fell approx 4 hours ago (1730).  Abrasion noted to left brow, skin intact.  Not other injuries noted.  Pt states that facility dent him to ED "to be checked out"

## 2020-05-12 NOTE — ED PROVIDER NOTE - PHYSICAL EXAMINATION
VITAL SIGNS: I have reviewed nursing notes and confirm.  CONSTITUTIONAL: Well-developed; well-nourished; in no acute distress.  SKIN: Skin exam is warm and dry, no acute rash, abrasion to L superior orbit  HEAD: Normocephalic; abrasion to L superior orbit, no step off or significant swelling  EYES: PERRL, EOM intact; conjunctiva and sclera clear.  ENT: No nasal discharge; airway clear.   NECK: Supple; non tender.  CARD: RRR, no murmur  RESP: No wheezes, rales or rhonchi.  ABD: Normal bowel sounds; soft; non-distended; non-tender  EXT: Normal ROM, no abrasions, contusions or deformities  NEURO: Awake, alert, oriented. CN II-XII intact, 5/5 strength at upper and lower extremities, moderate L sided tremor, no pronator drift, slow finger to nose, clear speech  PSYCH: Cooperative, appropriate.

## 2020-05-12 NOTE — ED PROVIDER NOTE - CARE PLAN
Assessment and plan of treatment:	Sp mechanical fall, no AC use -- CT, tdap, no wounds to be closed. Principal Discharge DX:	Fall  Assessment and plan of treatment:	Sp mechanical fall, no AC use -- CT, tdap, no wounds to be closed.  Secondary Diagnosis:	Abrasion

## 2020-05-12 NOTE — ED PROVIDER NOTE - OBJECTIVE STATEMENT
70 yo M, history of parkinson's disease, HTN, HLD, DM II, no AC use, currently living in assisted living facility -- here for assessment sp fall. Patient had a mechanical trip and fall this evening, around 1730. Fall was witnessed by staff, he tripped and was unable to brace himself because he was carrying a bowl of oatmeal, hit L side of face on the floor. Was able to rise to standing with help and was ambulatory with cane as he normally is. He initially did not seek medical care as he felt fine but was encouraged to come to ED by evening RN manager.    Had no LOC. He has no complaints currently, has small abrasion to L superior orbit. No nausea, vomiting, HA, change in vision or gait, dizziness -- has chronic intermittent vertigo but currently has no sx -- chest pain, ankle, knee or wrist pain.     Patient is not on any anticoagulation.

## 2020-05-12 NOTE — ED PROVIDER NOTE - CLINICAL SUMMARY MEDICAL DECISION MAKING FREE TEXT BOX
CT head and C spine negative -- no wound closure needed, wound cleaned, received tdap, stable for dc back to assisted living.

## 2021-01-01 NOTE — PATIENT PROFILE ADULT - NSASFUNCLEVELADLTRANSFER_GEN_A_NUR
[facility] INPATIENT ENCOUNTER  PEDIATRICS PROGRESS NOTE      History Of Present Illness  Girl is a 2 month old  female presenting with hypertension    I have been asked by the NICU to consult on   Rosemarie Clarke.  Rosemarie Clarke was born on 2019 and admitted to the NICU at [facility] on 2021.  Pediatric nephrology is consulted for hypertension. Rosemarie Clarke was born at 38 and 3/7 weeks gestation by cesarian section to a 31 year old  four para three to four mother who was  O + blood type,   HIV negative,   Hepatitis  B  Surface antigen negative,  RPR nonreactive,  Chlamydia trachomatis negative,  Neisseria gonorrhea negative,  Rubella immune,   Group   B  Streptococcus status unknown. She received cefazolin , one dose. Pregnancy was complicated by prenatal finding of left congenital diaphragmatic hernia.  Rosemarie Clarke was born by cesarian section, noted to have three vessel umbilical cord,  Birth weight 2490 grams, birth length 47.5 cm, birth head circumference 33.5 cm,  Apgar scores seven at one minute and nine at five minutes.   Rosemarie Clarke underwent repair of the left congenital diaphragmatic hernia on 2021 with gortex patch. .she also underwent  [place] procedure and inversion appendectomy.   She developed irritability  And fever to 38.5 degrees C on 2021.  Blood culture was positive for  Staphylococcus aureus. On 2021 Rosemarie Clarke underwent wound closure.     Antibiotic History:  - Perioperative Cefazolin 2021 - 2021  - Gentamicin x1  2021  - Ceftazidime 50mg/kg 2021 - 2021   - Vancomycin 15mg/kg 2021 - 2021  - Oxacillin 25mg/kg q6H 2021 - 2021     ID data/cultures:  2021 CSF Bacterial Culture with  Gram stain No Growth   2021: peripheral blood culture Gram positive cocci in clusters  Staphylococcus  aureus   2021   Urine culture  +500 CFU/mL Staphylococcus aureus, MSSA  2021Transfused Blood Products, Bacterial Cx - No growth  2021 peripheral blood culture  No growth     2021 peripheral blood culture No growth   2021 central line culture   No growth   2021 blood culture  No growth    Date   CRP  PROCALCITONIN     (mg/dl) (ng/ml)  2021  6.9  0.28  2021  1.2  2021  0.4  0.10    Rosemarie Clarke has received post surgical sedation including fentanyl continuous infusion (discontinued 20210,  precedex continuous infusion (discontinued 2021), methadone discontinued 2021. Rosemarie Clarke is currently receiving clonidine  6 micrograms  NG q 8 hours and morphine sulfate as needed for agitation and withdrawal.    Rosemarie Clarke is noted to have hypertension.    2021  Labs  Sodium 140 , potassium 7.5, chloride  110, bicarbonate 19,  BUN  10 mg/dl, creatinine 0.24 mg/dl, calcium 10.6 mg/dl  Sodium 139 , potassium 4.7, chloride  106, bicarbonate 25,  BUN  9 mg/dl, creatinine 0.27 mg/dl, calcium 9.7 mg/dl    2021  Urine analysis 2021  Urine sp grav less than 1.005, urine pH 7,  Urine trace leukocyte esterase on urine dipstick examination  Microscopic examination of the urine sediment demonstrates \"few\" bacteria.    2021  Echocardiogram 2021  Echocardiogram is read as demonstrating small patent foramen ovale.  There is no evidence for coarctation of the aorta. There is no evidence for left ventricular hypertroph.     2021  Vascular duplex of renal arteries 2021  Vascular duplex of renal arteries is read as demonstrating bilateral renal arteries patent, a single renal artery is seen on each side. The renal arteries are both patent.  The renal veins are both normal and patent.  This is a normal vascular duplex of renal arteries and veins.      Blood pressures:  87 - 112 / 41 - 87 mm  Hg            50  %  95 %  99 %  Systolic   blood pressure  88  105  110    Diastolic   50  68  73  blood pressure     Mean arterial    63  80  85  blood pressure          Pediatric History  Birth History   • Birth     Length: 18.7\" (47.5 cm)     Weight: 2.49 kg (5 lb 7.8 oz)     HC 33.5 cm (13.19\")   • Apgar     One: 7.0     Five: 9.0   • Delivery Method: , Low Transverse   • Gestation Age: 38 3/7 wks     Baby to NICU for close observation. See NICU note       Past Medical History   has a past medical history of Congenital hernia of the diaphragm and Oral thrush (2021).    Surgical History   has a past surgical history that includes Other surgical history (2021).     Allergies  ALLERGIES:  Patient has no known allergies.    Medications  No medications prior to admission.         Medications Orders:   Scheduled Meds:   • cloNIDine  5 mcg Oral Q8H   • pediatric multivitamin  1 mL Oral Q24H   • silver sulfADIAZINE   Topical Daily     Continuous Infusions:   PRN Meds: silver sulfADIAZINE     Family History  No family history on file.   Maternal  Grand mother cancer  Maternal  Grand father hypothyroidism  Maternal uncle hypothyroidism  Maternal aunt  KIDNEY DISEASE    Social History   has no history on file for tobacco use, alcohol use, and drug use.   Girl  Gianna Clarke will be living in  [place], [place] with her family.    ROS  REVIEW OF SYSTEMS:    GENERAL: NEGATIVE EXCEPT AS NOTED IN HISTORY OF PRESENT ILLNESS.    HEAD: NEGATIVE EXCEPT AS NOTED IN HISTORY OF PRESENT ILLNESS    EYES: NEGATIVE EXCEPT AS NOTED IN HISTORY OF PRESENT ILLNESS    EARS: NEGATIVE EXCEPT AS NOTED IN HISTORY OF PRESENT ILLNESS    MOUTH: NEGATIVE EXCEPT AS NOTED IN HISTORY OF PRESENT ILLNESS    THROAT: NEGATIVE EXCEPT AS NOTED IN HISTORY OF PRESENT ILLNESS    NECK: NEGATIVE EXCEPT AS NOTED IN HISTORY OF PRESENT ILLNESS    LUNGS: NEGATIVE EXCEPT AS NOTED IN HISTORY OF PRESENT ILLNESS    CV: NEGATIVE EXCEPT AS NOTED IN HISTORY OF  PRESENT ILLNESS    GI: NEGATIVE EXCEPT AS NOTED IN HISTORY OF PRESENT ILLNESS    BACK: NEGATIVE EXCEPT AS NOTED IN HISTORY OF PRESENT ILLNESS    MUSCULOSKELETAL: NEGATIVE EXCEPT AS NOTED IN HISTORY OF PRESENT ILLNESS    NEUROLOGICAL: NEGATIVE EXCEPT AS NOTED IN HISTORY OF PRESENT ILLNESS    EXTR:NEGATIVE EXCEPT AS NOTED IN HISTORY OF PRESENT ILLNESS    /RENAL: NEGATIVE EXCEPT AS NOTED IN HISTORY OF PRESENT ILLNESS    SKIN: NEGATIVE EXCEPT AS NOTED IN HISTORY OF PRESENT ILLNESS    ALLERGY/IMMUNOLOGY: NEGATIVE EXCEPT AS NOTED IN HISTORY OF PRESENT ILLNESS    ENDOCRINOLOGY: NEGATIVE EXCEPT AS NOTED IN HISTORY OF PRESENT ILLNESS    HEMATOLOGY/LYMPHATICS: NEGATIVE EXCEPT AS NOTED IN HISTORY OF PRESENT ILLNESS            Physical Exam  Weight    05/13/21 1700 05/16/21 1300 05/18/21 0800 05/20/21 1700   Weight: 4.41 kg (9 lb 11.6 oz) 4.46 kg (9 lb 13.3 oz) 4.515 kg (9 lb 15.3 oz) 4.415 kg (9 lb 11.7 oz)      Blood pressure 97/49, pulse (!) 176, temperature 98.1 °F (36.7 °C), temperature source Axillary, resp. rate 52, height 20\" (50.8 cm), weight 4.415 kg (9 lb 11.7 oz), head circumference 38 cm (14.96\"), SpO2 100 %.   Blood pressure percentiles are not available for patients under the age of 1.    Intake/Output Summary (Last 24 hours) at 2021 2118  Last data filed at 2021 1700  Gross per 24 hour   Intake 600 ml   Output 399 ml   Net 201 ml         Physical Exam  Vitals and nursing note reviewed.   Constitutional:       General: She is active. She is irritable. She has a strong cry. She is not in acute distress.     Appearance: She is well-developed. She is not toxic-appearing or diaphoretic.   HENT:      Head: Normocephalic and atraumatic. No cranial deformity or facial anomaly. Anterior fontanelle is flat.      Right Ear: Tympanic membrane, ear canal and external ear normal. There is no impacted cerumen. Tympanic membrane is not erythematous or bulging.      Left Ear: Tympanic membrane, ear canal and external  ear normal. There is no impacted cerumen. Tympanic membrane is not erythematous or bulging.      Nose: Nose normal. No congestion or rhinorrhea.      Mouth/Throat:      Mouth: Mucous membranes are moist.      Pharynx: Oropharynx is clear. No oropharyngeal exudate or posterior oropharyngeal erythema.   Eyes:      General: Red reflex is present bilaterally.         Right eye: No discharge.         Left eye: No discharge.      Conjunctiva/sclera: Conjunctivae normal.      Pupils: Pupils are equal, round, and reactive to light.   Cardiovascular:      Rate and Rhythm: Normal rate and regular rhythm.      Pulses: Normal pulses.      Heart sounds: Normal heart sounds, S1 normal and S2 normal. No murmur. No friction rub. No gallop.    Pulmonary:      Effort: Pulmonary effort is normal. No respiratory distress, nasal flaring or retractions.      Breath sounds: Normal breath sounds. No stridor or decreased air movement. No wheezing, rhonchi or rales.   Abdominal:      General: Bowel sounds are normal. There is no distension.      Palpations: Abdomen is soft. There is no mass.      Tenderness: There is no abdominal tenderness. There is no guarding or rebound.      Hernia: No hernia is present.   Genitourinary:     General: Normal vulva.      Labia: No labial fusion. No rash.        Rectum: Normal.   Musculoskeletal:         General: No swelling, tenderness, deformity or signs of injury. Normal range of motion.      Cervical back: Normal range of motion. No rigidity.   Lymphadenopathy:      Head: No occipital adenopathy.      Cervical: No cervical adenopathy.   Skin:     General: Skin is warm.      Capillary Refill: Capillary refill takes less than 2 seconds.      Turgor: Normal.      Coloration: Skin is not cyanotic, jaundiced, mottled or pale.      Findings: No erythema, petechiae or rash. Rash is not purpuric. There is no diaper rash.      Comments: Surgical incision across upper left abdomen  Swedish spots on buttocks and  on lumbar sacral region of the back  Sacral dimple, no fluid felt sacral dimple, bottom of sacral dimple visualized and palpated    Neurological:      General: No focal deficit present.      Mental Status: She is alert.      Sensory: No sensory deficit.      Motor: No abnormal muscle tone.      Primitive Reflexes: Suck normal.      Deep Tendon Reflexes: Reflexes normal.              Last Recorded Vitals    VITAL SIGNS:     Vital Last Value 24 Hour Range   Temperature 98.1 °F (36.7 °C) (05/21/21 1700) Temp  Min: 97.9 °F (36.6 °C)  Max: 98.2 °F (36.8 °C)   Pulse (!) 176 (05/21/21 1900) Pulse  Min: 118  Max: 177   Respiratory 52 (05/21/21 1900) Resp  Min: 32  Max: 81   Non-Invasive  Blood Pressure 97/49 (05/21/21 1300) BP  Min: 97/49  Max: 109/47   Pulse Oximetry 100 % (05/21/21 1900) SpO2  Min: 97 %  Max: 100 %     Vital Today Admitted   Weight 4.415 kg (9 lb 11.7 oz) (05/20/21 1700) Weight: 2.49 kg (5 lb 7.8 oz)(Filed from Delivery Summary) (03/08/21 1612)   Height N/A Length: 18.7\" (47.5 cm)(Filed from Delivery Summary) (03/08/21 1612)   Body Mass Index N/A BMI (Calculated): 11.04 (03/08/21 1612)     INTAKE/OUTPUT:      Intake/Output Summary (Last 24 hours) at 2021 2118  Last data filed at 2021 1700  Gross per 24 hour   Intake 600 ml   Output 399 ml   Net 201 ml       Date 05/20/21 1500 - 05/21/21 0659 05/21/21 0700 - 05/22/21 0659   Shift 7055-0753 2771-5680 24 Hour Total 1020-2599 7857-9770 7940-8324 24 Hour Total   INTAKE   P.O. 170 155 431 153 75  228   NG/GT 70 85 289 87 45  132   Shift Total 240 240 720 240 120  360   OUTPUT   Urine  149 149 152 98  250   Shift Total  149 149 152 98  250   Weight (kg) 4.4 4.4 4.4 4.4 4.4 4.4 4.4       LABORATORY DATA:       Recent Results (from the past 48 hour(s))   Free T4    Collection Time: 05/20/21 12:55 PM   Result Value Ref Range    T4, Free 1.2 0.9 - 1.5 ng/dL   Thyroid Stimulating Hormone    Collection Time: 05/20/21 12:55 PM   Result Value Ref Range    TSH  2.743 0.816 - 6.430 mcUnits/mL   Free T3    Collection Time: 05/20/21 12:55 PM   Result Value Ref Range    T3, Free 4.0 3.5 - 5.3 pg/mL   Gold Top    Collection Time: 05/20/21 12:59 PM   Result Value Ref Range    Extra Tube Hold for Add Ons    ]    No results found  Recent Labs   Lab 05/17/21  1338 05/17/21  1044   SODIUM 139 140   CHLORIDE 106 110*   CO2 25 19*   BUN 9 10   CREATININE 0.27 0.24   CALCIUM 9.7 10.6   GLUCOSE 91 97     No results found  Lab Results   Component Value Date    PCT 0.10 (H) 2021    PCT 0.28 (H) 2021     Lab Results   Component Value Date    CRP 0.4 2021    CRP 1.2 (H) 2021    CRP 6.9 (H) 2021       Microbiology Results     None           IMAGING STUDIES:    LAST ECHO/ECHO STRESS:  No procedure found.  LAST MRI:  No results found for this or any previous visit.  LAST CT:  No results found for this or any previous visit.  LAST EKG:    Encounter Date: 03/08/21   Electrocardiogram 12-Lead   Result Value    Ventricular Rate EKG/Min (BPM) 147    Atrial Rate (BPM) 147    MS-Interval (MSEC) 85    QRS-Interval (MSEC) 57    QT-Interval (MSEC) 271    QTc 424    P Axis (Degrees) 30    R Axis (Degrees) 121    T Axis (Degrees) 6    REPORT TEXT      Normal sinus rhythm  Normal ECG  Confirmed by SHARA BOWERS [facility] ([id number]) on 2021 2:58:53 PM       LAST X-RAY:  03/08/21   XR ABDOMEN 2 VIEWS  Narrative  EXAMINATION: XR ABDOMEN 2 VIEWS  CLINICAL HISTORY: Increasing abdominal girth.  History of diaphragmatichernia and abdominal wall defect.TECHNIQUE: Two views of the abdomen were obtained.COMPARISON: 2021FINDINGS: There is external dressing overlying the left upper chest.  There is mildgastric distention and mild diffuse bowel distention, increased comparedwith the previous examination.  No free air is seen.  There is no evidenceto suggest bowel obstruction.  No pneumatosis is noted.  Body wall edemaappears improved.  Left basilar opacity is improved.  No other  significantfindings are appreciated.  Impression  Increased bowel distention.  Otherwise, unremarkable abdomen.Electronically Signed by: OMAYRA HERNANDEZ MD Signed on: 2021 1:01 PM      XR CHEST  1 VIEW  Narrative  EXAM: XR CHEST  1 VIEW EXAM DATE: 2021CLINICAL INDICATION: Female, 13 days old. Increased breathing effortCOMPARISON:  2021TECHNIQUE: Frontal view of the chest.FINDINGS:  ET tube tip near the deann.  Enteric tube terminates in the stomach withupper sidehole in the lower thoracic esophagus.  Right IJ central venouscatheter tip likely near the right brachiocephalic confluence.The cardiothymic silhouette is normal.Persistent dense atelectasis/consolidation at the left lung base. Opacities in the left mid and upper lung zone, increased from the priorstudy.  Mild partial right upper lobe atelectasis persists.  No largepleural effusions.  No pneumothorax.The visualized bones and upper abdomen are unremarkable.  Impression  ET tube tip near the deann.Increase in left lung opacities/atelectasis compared to 2021.Enteric tube upper sideholes in the distal thoracic esophagus.Electronically Signed by: MAGNOLIA CASSIDY M.D. Signed on: 2021 2:54 PM      XR ABDOMEN 2 VIEWS  Narrative  EXAMINATION: XR ABDOMEN 2 VIEWS  EXAM DATE: 2021 10:33 AMCLINICAL HISTORY: 10 days Female  s/p CDH repair, bilious emesis COMPARISON: 2021  FINDINGS:Lines, tubes, and devices:  Enteric catheter terminates over the left upperquadrant.Lungs and pleura: Right lung base is aerated.  Left lung base is opacified. Left pneumothorax is no longer seen.Abdomen: Postoperative changes involving the left hemidiaphragm and leftupper quadrant bowel is again noted.  Configuration is similar to priorexam.  Bowel is not abnormally dilated.  There is no gross pneumoperitoneumon this single view.   Right-sided bowel is slightly displaced mediallyquestioning a small amount of intrahepatic ascites versus  abdominal walledema.      Impression  1.    Left pneumothorax is replaced bilateral basilar opacity, incompletelyassessed.2.   Stable postoperative left upper quadrant changes.3.   Suggestion of small right infrahepatic ascites or abdominal walledema. Electronically Signed by: ARIAN EVERETT M.D. Signed on: 2021 11:13 AM   LAST U/S:  21   US INFANT HEAD  Narrative  EXAMINATION: US INFANT HEAD  EXAM DATE: 2021 12:00 PMCLINICAL HISTORY: 1 day Female  sacral dimple COMPARISON: None available of the brain TECHNIQUE: Real-time ultrasound examination of the brain was performedthrough the anterior fontanelle in coronal and sagittal planes using grayscale and color Doppler technique. FINDINGS: Ventricles: Normal in size and configuration. Hemorrhage: No abnormal intraventricular or intraparenchymal echogenicityto indicate hemorrhage. Parenchymal echogenicity: Appears within normal variation. Sulcation: Normal.Extra-axial fluid: No abnormal fluid collection.  Superior sagittal sinus: Flow documented.Posterior fossa: Limited evaluation.  Impression  Unremarkable head ultrasound.Electronically Signed by: AYSHA BURNS M.D. Signed on: 2021 12:23 PM      US SPINAL CANAL AND CONTENTS  Narrative  EXAM: US SPINAL CANAL AND CONTENTS EXAM DATE: 2021CLINICAL INDICATION: Female, 1 day old. Sagittal; sacral pit, rule outsacral defectCOMPARISON:  None.TECHNIQUE: Sonographic examination of the  lumbosacral spine wasobtained in longitudinal and transverse planes with the baby in proneposition. FINDINGS: Distal cord: Normal shape and echogenicity.Conus medullaris: Terminates at the L2 level, which is within normallimits.Filum terminale: Measures 0.6 mm in diameter and is normal in appearance. There is a 2 x 4 mm filar cyst, a normal variant.Cauda Equina: Pulsations are seen in the nerve roots on real time imaging. Posterior elements: No evidence of spinal dysraphism.Sacral dimple: Projects at the level of  the coccyx. No subcutaneous mass orfluid-filled tract is seen.  Impression  Unremarkable sonographic evaluation of the spine, as above.Electronically Signed by: AYSHA BURNS M.D. Signed on: 2021 8:40 AM      US KIDNEY BILATERAL  Narrative  EXAM: US KIDNEY BILATERAL EXAM DATE: 2021CLINICAL INDICATION: Female, 1 day old. ; eval for hydronephrosis andbladder retention in presence of sacral pitCOMPARISON:  None.TECHNIQUE: Real-time gray scale sonography and color Doppler imaging of thekidneys and urinary bladder was performed.FINDINGS:According to the standards of [place] and [place], the normal renal length fora  is approximately 4.9 cm (+/- 1.1 cm), for a range including 2standard deviations in both directions of approximately 3.8 cm - 6 cm.Right: Length: Four cm, within normal limits for patient age.Parenchyma: Normal echogenicity with normal corticomedullarydifferentiation. No cortical thinning or focal defect.Pelvicalyceal dilatation: MildCalculi: No focal shadowing stones are demonstrated.Perinephric: No perinephric fluid or collection.Ureter: Not visualized.Left: Length: 4.8 cm, within normal limits for patient age.Parenchyma: Normal echogenicity with normal corticomedullarydifferentiation. No cortical thinning or focal defect.Pelvicalyceal dilatation: None.Calculi: No focal shadowing stones are demonstrated.Perinephric: No perinephric fluid or collection.Ureter: Not visualized.Urinary bladder: Partially distended.  A catheter is present.  No debris, wall thickening,or focal wall abnormality.  Impression  Mild right pelvocaliectasis.Unremarkable sonographic evaluation of the left kidney and partially filledurinary bladder.Electronically Signed by: AYSHA BURNS M.D. Signed on: 2021 8:34 AM       ASSESSMENT:  Principal Problem:    Congenital diaphragmatic hernia  Active Problems:    Hypertension    Elevated blood pressure reading    [place] infant of 38 completed weeks of gestation    Born by   section    Other respiratory distress of     Pulmonary hypoplasia    Sacral pit    Hyponatremia    Low serum cortisol level (CMS/HCC)    Hypocalcemia    Staphylococcus aureus bacteremia  1.  Rosemarie Clarke was born on 2019 and admitted to the NICU at [facility] on 2021.  Pediatric nephrology is consulted for hypertension. Rosemarie Clarke was born at 38 and 3/7 weeks gestation by cesarian section to a 31 year old  four para three to four mother who was  O + blood type,   HIV negative,   Hepatitis  B  Surface antigen negative,  RPR nonreactive,  Chlamydia trachomatis negative,  Neisseria gonorrhea negative,  Rubella immune,   Group   B  Streptococcus status unknown. She received cefazolin , one dose. Pregnancy was complicated by prenatal finding of left congenital diaphragmatic hernia.  Rosemarie Clarke was born by cesarian section, noted to have three vessel umbilical cord,  Birth weight 2490 grams, birth length 47.5 cm, birth head circumference 33.5 cm,  Apgar scores seven at one minute and nine at five minutes.   Rosemarie Clarke underwent repair of the left congenital diaphragmatic hernia on 2021 with gortex patch. .she also underwent  [place] procedure and inversion appendectomy.   She developed irritability  And fever to 38.5 degrees C on 2021.  Blood culture was positive for  Staphylococcus aureus. On 2021 Rosemarie Clarke underwent wound closure.  2.   Antibiotic History:  - Perioperative Cefazolin 2021 - 2021  - Gentamicin x1  2021  - Ceftazidime 50mg/kg 2021 - 2021   - Vancomycin 15mg/kg 2021 - 2021  - Oxacillin 25mg/kg q6H 2021 - 2021   3.  ID data/cultures:  2021 CSF Bacterial Culture with  Gram stain No Growth   2021: peripheral blood culture Gram positive cocci in clusters  Staphylococcus  aureus   2021  Urine culture  +500  CFU/mL Staphylococcus aureus, MSSA  2021Transfused Blood Products, Bacterial Cx - No growth  2021 peripheral blood culture  No growth     2021 peripheral blood culture No growth   2021 central line culture   No growth   2021 blood culture  No growth  4.  Date   CRP  PROCALCITONIN     (mg/dl) (ng/ml)  2021  6.9  0.28  2021  1.2  2021  0.4  0.10    Rosemarie Clarke has received post surgical sedation including fentanyl continuous infusion (discontinued 20210,  precedex continuous infusion (discontinued 2021), methadone discontinued 2021. Rosemarie Clarke is currently receiving clonidine  6 micrograms  NG q 8 hours and morphine sulfate as needed for agitation and withdrawal.  5.  Rosemarie Clarke is noted to have hypertension.  6.  2021  Labs  Sodium 140 , potassium 7.5, chloride  110, bicarbonate 19,  BUN  10 mg/dl, creatinine 0.24 mg/dl, calcium 10.6 mg/dl  Sodium 139 , potassium 4.7, chloride  106, bicarbonate 25,  BUN  9 mg/dl, creatinine 0.27 mg/dl, calcium 9.7 mg/dl  7.  2021  Urine analysis 2021  Urine sp grav less than 1.005, urine pH 7,  Urine trace leukocyte esterase on urine dipstick examination  Microscopic examination of the urine sediment demonstrates \"few\" bacteria.  8.  2021  Echocardiogram 2021  Echocardiogram is read as demonstrating small patent foramen ovale.  There is no evidence for coarctation of the aorta. There is no evidence for left ventricular hypertroph.   9.  2021  Vascular duplex of renal arteries 2021  Vascular duplex of renal arteries is read as demonstrating bilateral renal arteries patent, a single renal artery is seen on each side. The renal arteries are both patent.  The renal veins are both normal and patent.  This is a normal vascular duplex of renal arteries and veins.    10.  Blood pressures:  87 - 112 / 41 - 87 mm  Hg            50  %  95 %  99 %  Systolic   blood pressure  88  105  110    Diastolic   50  68  73  blood pressure     Mean arterial    63  80  85  blood pressure  11.  Rosemarie Clarke has some elevated blood pressures and some normal blood pressures.    12. 2021  TSH  2.743 mcunits / ml normal  Free   T3  4 pg/ml normal  Free   T4  1.2 ng/dl nrmal  13.  2021  Renal ultrasound 2021  Right kidney  4.76 cm by 2.30 cm  Left kidney  5.40 cm by 3.31 cm  Expected kidney length based upon body length is 4.35 cm with 2 std dev 1.5 cm  The kidneys are normal with no pelvic dilatation, hydronephrosis, renal mass, renal cyst or nephrocalcinosis.  The renal ultrasound is normal.   14. 2021 - 2021   Blood pressures  97 - 115 /  41 - 94 mm   Hg   15.  2021 - 2021    11/11 systolic blood pressures greater than 50 % for age  7/11 diastolic blood pressures greater than 50 % for age  10/11 mean arterial pressures greater than 50% for age    6/11 systolic blood pressures greater than 95 % for age  2/11 diastolic blood pressures greater than 95 % for age  4/11 mean arterial pressures greater than 95% for age    4/11 systolic blood pressures greater than 99 % for age  2/11 diastolic blood pressures greater than 99 % for age  4311 mean arterial pressures greater than 99% for age   16.  2021 - 2021    10/10 systolic blood pressures greater than 50 % for age  7/10 diastolic blood pressures greater than 50 % for age  10/10 mean arterial pressures greater than 50% for age    4/10 systolic blood pressures greater than 95 % for age  1/10 diastolic blood pressures greater than 95 % for age  3/10 mean arterial pressures greater than 95% for age    3/10 systolic blood pressures greater than 99 % for age  1/10 diastolic blood pressures greater than 99 % for age  2/10 mean arterial pressures greater than 99% for age       RECOMMENDATIONS:  1.  Check renin level  2.  Check aldosterone  level  3.  Check TSH, free T4, free T3  2021  TSH  2.743 mcunits / ml normal  Free   T3  4 pg/ml normal  Free   T4  1.2 ng/dl nrmal  4.  Check renal ultrasound  2021  Renal ultrasound 2021  Right kidney  4.76 cm by 2.30 cm  Left kidney  5.40 cm by 3.31 cm  Expected kidney length based upon body length is 4.35 cm with 2 std dev 1.5 cm  The kidneys are normal with no pelvic dilatation, hydronephrosis, renal mass, renal cyst or nephrocalcinosis.  The renal ultrasound is normal.   5.  Check frequent blood pressures using recommended procedure for obtaining blood pressures in neonates.  6.  Avoid the use of nephrotoxic medications including acyclovir, aminoglycosides, amphotericin, nonsteroidal anti inflammatory agents, piperacillin / tazobactam, vancomycin, intravenous radiocontrast agents.  7.  Strict and accurate intake and output, carefully monitor weights as per NICU protocol.  8.  Slow taper of clonidine due to rebound effects of rapid tapering of clonidine.   9.  At this time, as blood pressures are not consistently elevated, please do not give any antihypertensive medications.         Shimon Basurto MD       2 = assistive person

## 2021-12-16 NOTE — ED ADULT NURSE NOTE - NSFALLRSKOUTCOME_ED_ALL_ED
Fall with Harm Risk PAST MEDICAL HISTORY:  CAD (coronary artery disease)     Chronic diarrhea     DM (diabetes mellitus)     Hepatitis C     HIV (human immunodeficiency virus infection)     HTN (hypertension)     PVD (peripheral vascular disease)     Thoracic aortic aneurysm

## 2022-09-22 ENCOUNTER — INPATIENT (INPATIENT)
Facility: HOSPITAL | Age: 72
LOS: 4 days | Discharge: HOME | End: 2022-09-27
Attending: HOSPITALIST | Admitting: HOSPITALIST

## 2022-09-22 VITALS
TEMPERATURE: 99 F | HEIGHT: 68 IN | DIASTOLIC BLOOD PRESSURE: 85 MMHG | WEIGHT: 179.9 LBS | OXYGEN SATURATION: 95 % | SYSTOLIC BLOOD PRESSURE: 138 MMHG | RESPIRATION RATE: 20 BRPM | HEART RATE: 95 BPM

## 2022-09-22 LAB
ALBUMIN SERPL ELPH-MCNC: 3.6 G/DL — SIGNIFICANT CHANGE UP (ref 3.5–5.2)
ALP SERPL-CCNC: 68 U/L — SIGNIFICANT CHANGE UP (ref 30–115)
ALT FLD-CCNC: <5 U/L — SIGNIFICANT CHANGE UP (ref 0–41)
ANION GAP SERPL CALC-SCNC: 10 MMOL/L — SIGNIFICANT CHANGE UP (ref 7–14)
APPEARANCE UR: CLEAR — SIGNIFICANT CHANGE UP
AST SERPL-CCNC: 11 U/L — SIGNIFICANT CHANGE UP (ref 0–41)
BACTERIA # UR AUTO: ABNORMAL
BASOPHILS # BLD AUTO: 0.01 K/UL — SIGNIFICANT CHANGE UP (ref 0–0.2)
BASOPHILS NFR BLD AUTO: 0.1 % — SIGNIFICANT CHANGE UP (ref 0–1)
BILIRUB SERPL-MCNC: 1.5 MG/DL — HIGH (ref 0.2–1.2)
BILIRUB UR-MCNC: NEGATIVE — SIGNIFICANT CHANGE UP
BUN SERPL-MCNC: 14 MG/DL — SIGNIFICANT CHANGE UP (ref 10–20)
CALCIUM SERPL-MCNC: 8.7 MG/DL — SIGNIFICANT CHANGE UP (ref 8.4–10.5)
CHLORIDE SERPL-SCNC: 106 MMOL/L — SIGNIFICANT CHANGE UP (ref 98–110)
CO2 SERPL-SCNC: 28 MMOL/L — SIGNIFICANT CHANGE UP (ref 17–32)
COLOR SPEC: ABNORMAL
CREAT SERPL-MCNC: 1.2 MG/DL — SIGNIFICANT CHANGE UP (ref 0.7–1.5)
DIFF PNL FLD: ABNORMAL
EGFR: 65 ML/MIN/1.73M2 — SIGNIFICANT CHANGE UP
EOSINOPHIL # BLD AUTO: 0.01 K/UL — SIGNIFICANT CHANGE UP (ref 0–0.7)
EOSINOPHIL NFR BLD AUTO: 0.1 % — SIGNIFICANT CHANGE UP (ref 0–8)
EPI CELLS # UR: 0 /HPF — SIGNIFICANT CHANGE UP (ref 0–5)
GLUCOSE SERPL-MCNC: 80 MG/DL — SIGNIFICANT CHANGE UP (ref 70–99)
GLUCOSE UR QL: NEGATIVE — SIGNIFICANT CHANGE UP
HCT VFR BLD CALC: 41.7 % — LOW (ref 42–52)
HGB BLD-MCNC: 14.3 G/DL — SIGNIFICANT CHANGE UP (ref 14–18)
HYALINE CASTS # UR AUTO: 4 /LPF — SIGNIFICANT CHANGE UP (ref 0–7)
IMM GRANULOCYTES NFR BLD AUTO: 0.4 % — HIGH (ref 0.1–0.3)
KETONES UR-MCNC: ABNORMAL
LEUKOCYTE ESTERASE UR-ACNC: ABNORMAL
LYMPHOCYTES # BLD AUTO: 1.6 K/UL — SIGNIFICANT CHANGE UP (ref 1.2–3.4)
LYMPHOCYTES # BLD AUTO: 16.4 % — LOW (ref 20.5–51.1)
MAGNESIUM SERPL-MCNC: 1.9 MG/DL — SIGNIFICANT CHANGE UP (ref 1.8–2.4)
MCHC RBC-ENTMCNC: 33.4 PG — HIGH (ref 27–31)
MCHC RBC-ENTMCNC: 34.3 G/DL — SIGNIFICANT CHANGE UP (ref 32–37)
MCV RBC AUTO: 97.4 FL — HIGH (ref 80–94)
MONOCYTES # BLD AUTO: 0.99 K/UL — HIGH (ref 0.1–0.6)
MONOCYTES NFR BLD AUTO: 10.1 % — HIGH (ref 1.7–9.3)
NEUTROPHILS # BLD AUTO: 7.12 K/UL — HIGH (ref 1.4–6.5)
NEUTROPHILS NFR BLD AUTO: 72.9 % — SIGNIFICANT CHANGE UP (ref 42.2–75.2)
NITRITE UR-MCNC: NEGATIVE — SIGNIFICANT CHANGE UP
NRBC # BLD: 0 /100 WBCS — SIGNIFICANT CHANGE UP (ref 0–0)
PH UR: 6 — SIGNIFICANT CHANGE UP (ref 5–8)
PLATELET # BLD AUTO: 118 K/UL — LOW (ref 130–400)
POTASSIUM SERPL-MCNC: 4.4 MMOL/L — SIGNIFICANT CHANGE UP (ref 3.5–5)
POTASSIUM SERPL-SCNC: 4.4 MMOL/L — SIGNIFICANT CHANGE UP (ref 3.5–5)
PROT SERPL-MCNC: 6.1 G/DL — SIGNIFICANT CHANGE UP (ref 6–8)
PROT UR-MCNC: ABNORMAL
RBC # BLD: 4.28 M/UL — LOW (ref 4.7–6.1)
RBC # FLD: 11.5 % — SIGNIFICANT CHANGE UP (ref 11.5–14.5)
RBC CASTS # UR COMP ASSIST: 3 /HPF — SIGNIFICANT CHANGE UP (ref 0–4)
SARS-COV-2 RNA SPEC QL NAA+PROBE: SIGNIFICANT CHANGE UP
SODIUM SERPL-SCNC: 144 MMOL/L — SIGNIFICANT CHANGE UP (ref 135–146)
SP GR SPEC: 1.01 — SIGNIFICANT CHANGE UP (ref 1.01–1.03)
TROPONIN T SERPL-MCNC: <0.01 NG/ML — SIGNIFICANT CHANGE UP
UROBILINOGEN FLD QL: SIGNIFICANT CHANGE UP
WBC # BLD: 9.77 K/UL — SIGNIFICANT CHANGE UP (ref 4.8–10.8)
WBC # FLD AUTO: 9.77 K/UL — SIGNIFICANT CHANGE UP (ref 4.8–10.8)
WBC UR QL: 82 /HPF — HIGH (ref 0–5)

## 2022-09-22 PROCEDURE — 72125 CT NECK SPINE W/O DYE: CPT | Mod: 26,MA

## 2022-09-22 PROCEDURE — 99285 EMERGENCY DEPT VISIT HI MDM: CPT | Mod: GC

## 2022-09-22 PROCEDURE — 72170 X-RAY EXAM OF PELVIS: CPT | Mod: 26

## 2022-09-22 PROCEDURE — 70450 CT HEAD/BRAIN W/O DYE: CPT | Mod: 26,MA

## 2022-09-22 PROCEDURE — 71045 X-RAY EXAM CHEST 1 VIEW: CPT | Mod: 26

## 2022-09-22 PROCEDURE — 93010 ELECTROCARDIOGRAM REPORT: CPT

## 2022-09-22 RX ORDER — LISINOPRIL 2.5 MG/1
40 TABLET ORAL DAILY
Refills: 0 | Status: DISCONTINUED | OUTPATIENT
Start: 2022-09-22 | End: 2022-09-27

## 2022-09-22 RX ORDER — LANOLIN ALCOHOL/MO/W.PET/CERES
10 CREAM (GRAM) TOPICAL AT BEDTIME
Refills: 0 | Status: DISCONTINUED | OUTPATIENT
Start: 2022-09-22 | End: 2022-09-27

## 2022-09-22 RX ORDER — ENTACAPONE 200 MG/1
200 TABLET, FILM COATED ORAL
Refills: 0 | Status: DISCONTINUED | OUTPATIENT
Start: 2022-09-22 | End: 2022-09-27

## 2022-09-22 RX ORDER — ASPIRIN/CALCIUM CARB/MAGNESIUM 324 MG
81 TABLET ORAL DAILY
Refills: 0 | Status: DISCONTINUED | OUTPATIENT
Start: 2022-09-22 | End: 2022-09-27

## 2022-09-22 RX ORDER — AZTREONAM 2 G
2000 VIAL (EA) INJECTION ONCE
Refills: 0 | Status: COMPLETED | OUTPATIENT
Start: 2022-09-22 | End: 2022-09-22

## 2022-09-22 RX ORDER — CLONAZEPAM 1 MG
1 TABLET ORAL
Qty: 0 | Refills: 0 | DISCHARGE

## 2022-09-22 RX ORDER — CARVEDILOL PHOSPHATE 80 MG/1
12.5 CAPSULE, EXTENDED RELEASE ORAL EVERY 12 HOURS
Refills: 0 | Status: DISCONTINUED | OUTPATIENT
Start: 2022-09-22 | End: 2022-09-27

## 2022-09-22 RX ORDER — LEVETIRACETAM 250 MG/1
500 TABLET, FILM COATED ORAL
Refills: 0 | Status: DISCONTINUED | OUTPATIENT
Start: 2022-09-22 | End: 2022-09-27

## 2022-09-22 RX ORDER — ENOXAPARIN SODIUM 100 MG/ML
40 INJECTION SUBCUTANEOUS EVERY 24 HOURS
Refills: 0 | Status: DISCONTINUED | OUTPATIENT
Start: 2022-09-22 | End: 2022-09-27

## 2022-09-22 RX ORDER — CARBIDOPA AND LEVODOPA 25; 100 MG/1; MG/1
2 TABLET ORAL THREE TIMES A DAY
Refills: 0 | Status: DISCONTINUED | OUTPATIENT
Start: 2022-09-22 | End: 2022-09-27

## 2022-09-22 RX ORDER — TRAZODONE HCL 50 MG
100 TABLET ORAL DAILY
Refills: 0 | Status: DISCONTINUED | OUTPATIENT
Start: 2022-09-22 | End: 2022-09-27

## 2022-09-22 RX ADMIN — ENOXAPARIN SODIUM 40 MILLIGRAM(S): 100 INJECTION SUBCUTANEOUS at 21:18

## 2022-09-22 RX ADMIN — Medication 10 MILLIGRAM(S): at 21:13

## 2022-09-22 RX ADMIN — Medication 100 MILLIGRAM(S): at 17:12

## 2022-09-22 RX ADMIN — CARBIDOPA AND LEVODOPA 2 TABLET(S): 25; 100 TABLET ORAL at 21:14

## 2022-09-22 NOTE — ED ADULT NURSE NOTE - CHIEF COMPLAINT QUOTE
Pt biba from [facility] states he has been having ams since yesterday, pt fell twice yesterday, unsure how and unsure what time. Pt aox3, gcs 15 in triage, denies pain, states he does not remember how he fell but remembers being ion the floor

## 2022-09-22 NOTE — ED PROVIDER NOTE - OBJECTIVE STATEMENT
71 yo male, PMHx of Parkinson's disease, gout, HTN, CVA ('98 and '16), DM, seizures on Keppra, presents with AMS. Discussed with [place], patient had two unwitnessed falls and syncope yesterday, as well as an episode of syncope in bed today, but since the falls yesterday, he has not been acting like himself. They say he is normally talkative and active but today has been sedentary and quiet. Patient does not recall these events. Denies fevers, chills, chest pain, shortness of breath, nausea, vomiting, headache, dizziness, abdominal pain.

## 2022-09-22 NOTE — ED ADULT NURSE NOTE - OBJECTIVE STATEMENT
Pt presents to the ED s/p multiple falls today. Pt states he fell and was found on the floor. Pt denies pain at this time is awake, alert and oriented x 4. Pt states he never had chest pain, dizziness or blurry vision at this time. Pt denies dysuria, hematuria and urinary frequency. pt was assisted to bathroom steady gait noted. No bruising on head noted.

## 2022-09-22 NOTE — CONSULT NOTE ADULT - ASSESSMENT
72 M with PMH of Parkinson's disease, gout, HTN, CVA ('98 and 2016), DM, presenting fall and syncope x2. He doesn't remember if he lost consciousness, or how long he was on the floor. He denies any prodromal nausea or vomiting head trauma, dizziness denies incontinence or loss of control over bowels before incident. ? post ictal state.  He denies previous similar episodes.     #Syncope r/o Seizure    Plan  REEG  fall safety precautions  obtain orthostatic bp  Cardio work up for syncope

## 2022-09-22 NOTE — ED PROVIDER NOTE - NS ED ATTENDING STATEMENT MOD
This was a shared visit with the ERIN. I reviewed and verified the documentation and independently performed the documented:

## 2022-09-22 NOTE — H&P ADULT - NSHPPHYSICALEXAM_GEN_ALL_CORE
Constitutional: pt is comfortable in bed; no acute distress; well-groomed  HEENT: Full ROM in bilateral eyes; pupils symmetrical and equal in size; neck supple  Respiratory: (+) sounds in all lung fields; no crackles or wheezes appreciated  Cardiovascular: S1 S2 regular rate and rhythm; no murmurs or gallops appreciated  Gastrointestinal:  abdomen is non-distended, non-tender to superficial and deep palpation  Neurological: AxO x3 to self, place and year  Skin: no rashes

## 2022-09-22 NOTE — CONSULT NOTE ADULT - NS ATTEND AMEND GEN_ALL_CORE FT
Patient examined this morning with his close friend and manager at bedside.  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  He is being treated by Dr. Ventura for parkinson symptoms.  He has unsteady gait.  Mild postural and rest tremor of left hand is noted.  He has hx of stroke.  Current head CT reports multiple chronic lacunes in deep grey matter nuclei raising possibility of a vascular parkinsons.  WIll recommend MRI brain to check for any new ischemic events.  Rivastigmine can be continued at home dose.  Check B12/folate, TSH.  Physical therapy gait evalution.

## 2022-09-22 NOTE — ED PROVIDER NOTE - ATTENDING APP SHARED VISIT CONTRIBUTION OF CARE
72 y,o, male, PMHx of Parkinson's disease, Gout, HTN, CVA ('98 and '16), DM, seizures on Keppra, presents with AMS. Discussed with [place], patient had two unwitnessed falls and syncope yesterday, as well as an episode of syncope in bed today, but since the falls yesterday, he has not been acting like himself. They say he is normally talkative and active but today has been sedentary and quiet. Patient does not recall these events. Denies fevers, chills, chest pain, shortness of breath, nausea, vomiting, headache, dizziness, abdominal pain. On exam, pt in NAD, AAOx3, head NC/AT, CN II-XII intact, PEERL, EOMi, neck (-) midline tenderness, lungs CTA B/L, CV S1S2 regular, abdomen soft/NT/ND/(+)BS, ext (-) edema, motor 5/5x4, sensation intact, ambulating with steady gait. WIll do labs, CT, UA and reevaluate.

## 2022-09-22 NOTE — ED ADULT NURSE REASSESSMENT NOTE - NS ED NURSE REASSESS COMMENT FT1
pt alert and oriented x4, with b/l breath sounds clear. Pt remains on cardiac monitor and denies chest pain. Pt denies any complaints at this time.

## 2022-09-22 NOTE — ED PROVIDER NOTE - PHYSICAL EXAMINATION
CONSTITUTIONAL: Well-developed; well-nourished; in no acute distress.   SKIN: warm, dry, no ecchymoses or abrasions  HEAD: Normocephalic; atraumatic.  EYES: no conjunctival injection. PERRLA. EOMI.   ENT: No nasal discharge; airway clear.  NECK: Supple; non tender. no midline or paraspinal tenderness  BACK: no signs of trauma or tenderness  CARD: S1, S2 normal; Regular rate and rhythm.   RESP: No wheezes, rales or rhonchi.  ABD: soft ntnd  EXT: Normal ROM.  No clubbing, cyanosis or edema.   NEURO: AAOx3  PSYCH: Cooperative, appropriate.

## 2022-09-22 NOTE — H&P ADULT - NSHPLABSRESULTS_GEN_ALL_CORE
14.3   9.77  )-----------( 118      ( 22 Sep 2022 14:56 )             41.7   09-22    144  |  106  |  14  ----------------------------<  80  4.4   |  28  |  1.2    Ca    8.7      22 Sep 2022 14:56  Mg     1.9     09-22    TPro  6.1  /  Alb  3.6  /  TBili  1.5<H>  /  DBili  x   /  AST  11  /  ALT  <5  /  AlkPhos  68  09-22

## 2022-09-22 NOTE — H&P ADULT - ASSESSMENT
72 M with PMH of Parkinson's disease, gout, HTN, CVA ('98 and '16), DM, hx of seizure, presenting for altered mental status and syncope.   Per patient, he was plugging something in at his assisted living facility, then fall over on to ground. He doesn't remember if he lost consciousness, or how long he was on the floor, but reports someone found him. He denies any prodromal nausea or vomiting before the incident, denies incontinence or loss of control over bowels before incident.  Per chart, patient had 2x falls, and inability to recollect incident.       Patient admitted for fall, from suspect syncope.        # Fall 2/2 suspect Syncope   # Hx of seizures   - CT Head: No acute pathology; mod-severe microvascular changes, chronic lacunar infarcts   - CT C Spine: No acute fracture   - X Ray Pelvis: no acute fracture; osteoarthritis   - EEG   -           72 M with PMH of Parkinson's disease, gout, HTN, CVA ('98 and '16), DM, hx of seizure, presenting for altered mental status and syncope.   Patient admitted for fall, from suspect syncope.     # Fall 2/2 suspect Syncope   # Hx of seizures   - CT Head: No acute pathology; mod-severe microvascular changes, chronic lacunar infarcts   - CT C Spine: No acute fracture   - X Ray Pelvis: no acute fracture; osteoarthritis   - check EEG   - check Orthostatics  - check  TTE   - consider admission to tele/remote tele   - consider neuro consult for hx of 2 prior strokes if above (-)     # Asymptomatic Bacturia   - UA (+)   - not currently complaining of symptoms   - monitor off abiox for now     # HTN   - c/w lisinopril     # DM   - hold home metformin   - check fingeresticks, start insulin prn   - Check A1c     # Hx of CVA (2 strokes in past)   - per patient, no residual deficits     # Parkinson   - c/w Entacapone   - c/w cabidopa-levidopa   - RIVASTIGMINE held, not available in our pharmacy     # Insomnia   - c/w trazodone   - c/w melatonin     DVT ppx: Lovenox   Diet: DASH/TLC   Dispo: Acute          72 M with PMH of Parkinson's disease, gout, HTN, CVA ('98 and '16), DM, hx of seizure, presenting for altered mental status and syncope.   Patient admitted for fall, from suspect syncope.     # Fall 2/2 suspect Syncope   # Hx of seizures   - CT Head: No acute pathology; mod-severe microvascular changes, chronic lacunar infarcts   - CT C Spine: No acute fracture   - X Ray Pelvis: no acute fracture; osteoarthritis   - f/u Keppra level (ordered)   - check EEG   - check Orthostatics  - check  TTE   - will add remote tele monitoring   - neuro consult, given hx of two strokes and history of seizures (per patient)     # Asymptomatic Bacturia   - UA (+)   - s/p aztreonam in ED   - not currently complaining of symptoms   - monitor off abiox for now     # HTN   - c/w lisinopril     # DM   - hold home metformin   - check fingeresticks, start insulin prn   - Check A1c     # Hx of CVA (2 strokes in past)   - per patient, no residual deficits     # Parkinson   - c/w Entacapone   - c/w cabidopa-levidopa   - RIVASTIGMINE held, not available in our pharmacy     # Insomnia   - c/w trazodone   - c/w melatonin     DVT ppx: Lovenox   Diet: DASH/TLC   Dispo: Acute

## 2022-09-22 NOTE — ED ADULT TRIAGE NOTE - CHIEF COMPLAINT QUOTE
Pt biba from [facility] states he has been having ams since yesterday, pt fell twice yesterday, unsure how and unsure what time. Pt aox3, gcs 15 in triage, denies pain, states he does not remember how he fell but remembers being ion the floor

## 2022-09-22 NOTE — H&P ADULT - HISTORY OF PRESENT ILLNESS
72 M with PMH of Parkinson's disease, gout, HTN, CVA ('98 and '16), DM, presenting for altered mental status and syncope.   Per patient, he was plugging something in at his assisted living facility, then fall over on to ground. He doesn't remember if he lost consciousness, or how long he was on the floor, but reports someone found him. He denies any prodromal nausea or vomiting before the incident, denies incontinence or loss of control over bowels before incident.  72 M with PMH of Parkinson's disease, gout, HTN, CVA ('98 and '16), DM, presenting for altered mental status and syncope.   Per patient, he was plugging something in at his assisted living facility, then fall over on to ground. He doesn't remember if he lost consciousness, or how long he was on the floor, but reports someone found him. He denies any prodromal nausea or vomiting before the incident, denies incontinence or loss of control over bowels before incident.  Per chart, patient had 2x falls, and inability to recollect incident.     ED Course:   T 99, HR 95, /85, RR 20, 95% on RA   CT Head: No acute pathology; mod-severe microvascular changes, chronic lacunar infarcts   CT C Spine:       72 M with PMH of Parkinson's disease, gout, HTN, CVA ('98 and '16), DM, presenting for altered mental status and syncope.   Per patient, he was plugging something in at his assisted living facility, then fall over on to ground. He doesn't remember if he lost consciousness, or how long he was on the floor, but reports someone found him. He denies any prodromal nausea or vomiting before the incident, denies incontinence or loss of control over bowels before incident.  Per chart, patient had 2x falls, and inability to recollect incident.     ED Course:   T 99, HR 95, /85, RR 20, 95% on RA   CT Head: No acute pathology; mod-severe microvascular changes, chronic lacunar infarcts   CT C Spine: No acute fracture   X Ray Pelvis: no acute fracture; osteoarthritis     Patient admitted for fall, from suspect syncope.

## 2022-09-22 NOTE — CONSULT NOTE ADULT - SUBJECTIVE AND OBJECTIVE BOX
CC: Fall and syncope      HPI:  72 M with PMH of Parkinson's disease, gout, HTN, CVA ('98 and 2016), DM, presenting fall and syncope x2. Per patient, he was plugging something in at his assisted living facility, when he assisted himself to the floor ? LOC. The second episode was the same day when he was found on the floor by his neighbors. He doesn't remember if he lost consciousness, or how long he was on the floor. He denies any prodromal nausea or vomiting dizziness denies incontinence or loss of control over bowels before incident. Per chart, patient had 2x falls, and inability to recollect incident. He denies previous similar episodes.       Home Medications:  Aspirin 81 mg oral delayed release tablet: 1 tab(s) orally once a day (19 Jul 2019 10:14)  Carbidopa-levodopa 25 mg-100 mg oral tablet: 2 tab(s) orally 3 times a day (22 Sep 2022 18:12)  Carvedilol 12.5 mg oral tablet: 1 tab(s) orally every 12 hours (19 Jul 2019 10:14)  Entacapone 200 mg oral tablet: 1 tab(s) orally 4 times a day with sinemet (19 Jul 2019 10:14)  Levetriacetam 500 mg oral tablet: 1 tab(s) orally 2 times a day (13 Jan 2020 15:21)  Lisinopril 40 mg oral tablet: 1 tab(s) orally once a day (13 Jan 2020 15:21)  Melatonin 10 mg oral capsule: 1 cap(s) orally once a day (at bedtime) (22 Sep 2022 18:14)  Metformin 500 mg oral tablet, extended release: 1 tab(s) orally once a day (at bedtime) (13 Jan 2020 15:21)  Rivastigmine 9.5 mg/24 hr transdermal film, extended release: 1 patch transdermal every 24 hours (19 Jul 2019 10:14)  Trazadone 100 mg oral tablet: 1 tab(s) orally once a day (22 Sep 2022 18:15)      Social History  Denies smoking alcohol or drug use      Neuro Exam:  Orientation: Patient is a/o x3. Following commands  Attention: Attentive  Language: No difficulty naming common objects, no difficulty repeating a phrase, fluency intact, comprehension intact .   Fund of knowledge: Able to give history  Cranial Nerves: EOMI, VFF, Perrla, face symmetric, facial sensation intact, tongue midline, normal shoulder shrug.  Motor: 5/5 throughout/ drift             Mild resting tremor   Sensory exam:  intact and symmetric  Coordination:. no dysmetria or limb ataxia  Gait: deferred        Allergies    cefaclor (Unknown)  penicillin (Unknown)  sulfa drugs (Unknown)    Intolerances      MEDICATIONS  (STANDING):  Aspirin enteric coated 81 milligrams Oral daily  Carbidopa/levodopa  25/100 2 Tablet(s) Oral three times a day  Carvedilol 12.5 milligram Oral every 12 hours  Enoxaparin Injectable 40 milligram Subcutaneous every 24 hours  Entacapone 200 milligrams Oral four times a day  Levetriacetam 500 milligrams Oral two times a day  Lisinopril 40 milligrams Oral daily  Melatonin 10 milligram Oral at bedtime  Trazadone 100 milligram Oral daily      MEDICATIONS  (PRN):      LABS:                        14.3   9.77  )-----------( 118      ( 22 Sep 2022 14:56 )             41.7     09-22    144  |  106  |  14  ----------------------------<  80  4.4   |  28  |  1.2    Ca    8.7      22 Sep 2022 14:56  Mg     1.9     09-22    TPro  6.1  /  Alb  3.6  /  TBili  1.5<H>  /  DBili  x   /  AST  11  /  ALT  <5  /  AlkPhos  68  09-22    Urine Microscopic-Add On (NC) (09.22.22 @ 15:00)   Epithelial Cells: 0 /HPF   Bacteria: Few   Red Blood Cell - Urine: 3 /HPF   White Blood Cell - Urine: 82 /HPF   Hyaline Casts: 4 /LPF     Urinalysis (09.22.22 @ 15:00)   pH Urine: 6.0   Glucose Qualitative, Urine: Negative   Blood, Urine: Small   Color: Carline   Urine Appearance: Clear   Bilirubin: Negative   Ketone - Urine: Small   Specific Gravity: 1.015   Protein, Urine: 30 mg/dL   Urobilinogen: <2 mg/dL   Nitrite: Negative   Leukocyte Esterase Concentration: Large           Neuro Imaging:  < from: CT Head No Cont (09.22.22 @ 13:48) >  Findings:    The ventricles and cortical sulci are within normal limits for age.    There are stable patchy and confluent hypodensities throughout the   hemispheric white matter without mass effect compatible with chronic   microvascular changes.    Stable chronic lacunar infarcts within bilateral deep gray nuclei.    There is no acute intracranial hemorrhage, extra-axial fluid collection   or midline shift.  Gray white matter differentiation is maintained.    Vascular calcifications are noted.    The visualized paranasal sinuses and mastoids are clear.    IMPRESSION:    1.  No evidence of acute intracranial pathology. Stable exam since   5/12/2020.    2.  Stable moderate/severe chronic microvascular changes and chronic   lacunar infarcts.    --- End of Report ---            JONO DOMINGUEZ MD; Attending Radiologist  This document has been electronically signed. Sep 22 2022  2:15PM    < end of copied text >    EEG:     Echo:   Carotid Doppler: N/A  Telemetry:              6-12 yrs

## 2022-09-22 NOTE — ED PROVIDER NOTE - CLINICAL SUMMARY MEDICAL DECISION MAKING FREE TEXT BOX
72 y,o, male, PMHx of Parkinson's disease, Gout, HTN, CVA ('98 and '16), DM, seizures on Keppra, presents with AMS. Discussed with [place], patient had two unwitnessed falls and syncope yesterday, as well as an episode of syncope in bed today, but since the falls yesterday, he has not been acting like himself. They say he is normally talkative and active but today has been sedentary and quiet. Patient does not recall these events. Denies fevers, chills, chest pain, shortness of breath, nausea, vomiting, headache, dizziness, abdominal pain. On exam, pt in NAD, AAOx3, head NC/AT, CN II-XII intact, PEERL, EOMi, neck (-) midline tenderness, lungs CTA B/L, CV S1S2 regular, abdomen soft/NT/ND/(+)BS, ext (-) edema, motor 5/5x4, sensation intact, ambulating with steady gait. Labs, CT, UA done and reviewed. Will admit for syncope work up.

## 2022-09-23 LAB
A1C WITH ESTIMATED AVERAGE GLUCOSE RESULT: 5.3 % — SIGNIFICANT CHANGE UP (ref 4–5.6)
ALBUMIN SERPL ELPH-MCNC: 3.3 G/DL — LOW (ref 3.5–5.2)
ALP SERPL-CCNC: 61 U/L — SIGNIFICANT CHANGE UP (ref 30–115)
ALT FLD-CCNC: <5 U/L — SIGNIFICANT CHANGE UP (ref 0–41)
ANION GAP SERPL CALC-SCNC: 11 MMOL/L — SIGNIFICANT CHANGE UP (ref 7–14)
AST SERPL-CCNC: 11 U/L — SIGNIFICANT CHANGE UP (ref 0–41)
BILIRUB SERPL-MCNC: 0.9 MG/DL — SIGNIFICANT CHANGE UP (ref 0.2–1.2)
BUN SERPL-MCNC: 13 MG/DL — SIGNIFICANT CHANGE UP (ref 10–20)
CALCIUM SERPL-MCNC: 8.1 MG/DL — LOW (ref 8.4–10.5)
CHLORIDE SERPL-SCNC: 106 MMOL/L — SIGNIFICANT CHANGE UP (ref 98–110)
CO2 SERPL-SCNC: 24 MMOL/L — SIGNIFICANT CHANGE UP (ref 17–32)
CREAT SERPL-MCNC: 1 MG/DL — SIGNIFICANT CHANGE UP (ref 0.7–1.5)
EGFR: 81 ML/MIN/1.73M2 — SIGNIFICANT CHANGE UP
ESTIMATED AVERAGE GLUCOSE: 105 MG/DL — SIGNIFICANT CHANGE UP (ref 68–114)
GLUCOSE BLDC GLUCOMTR-MCNC: 100 MG/DL — HIGH (ref 70–99)
GLUCOSE BLDC GLUCOMTR-MCNC: 103 MG/DL — HIGH (ref 70–99)
GLUCOSE BLDC GLUCOMTR-MCNC: 140 MG/DL — HIGH (ref 70–99)
GLUCOSE BLDC GLUCOMTR-MCNC: 189 MG/DL — HIGH (ref 70–99)
GLUCOSE BLDC GLUCOMTR-MCNC: 85 MG/DL — SIGNIFICANT CHANGE UP (ref 70–99)
GLUCOSE SERPL-MCNC: 74 MG/DL — SIGNIFICANT CHANGE UP (ref 70–99)
HCT VFR BLD CALC: 38.2 % — LOW (ref 42–52)
HGB BLD-MCNC: 13.8 G/DL — LOW (ref 14–18)
MAGNESIUM SERPL-MCNC: 1.8 MG/DL — SIGNIFICANT CHANGE UP (ref 1.8–2.4)
MCHC RBC-ENTMCNC: 34.8 PG — HIGH (ref 27–31)
MCHC RBC-ENTMCNC: 36.1 G/DL — SIGNIFICANT CHANGE UP (ref 32–37)
MCV RBC AUTO: 96.5 FL — HIGH (ref 80–94)
NRBC # BLD: 0 /100 WBCS — SIGNIFICANT CHANGE UP (ref 0–0)
PLATELET # BLD AUTO: 125 K/UL — LOW (ref 130–400)
POTASSIUM SERPL-MCNC: 4.1 MMOL/L — SIGNIFICANT CHANGE UP (ref 3.5–5)
POTASSIUM SERPL-SCNC: 4.1 MMOL/L — SIGNIFICANT CHANGE UP (ref 3.5–5)
PROT SERPL-MCNC: 5.6 G/DL — LOW (ref 6–8)
RBC # BLD: 3.96 M/UL — LOW (ref 4.7–6.1)
RBC # FLD: 11.4 % — LOW (ref 11.5–14.5)
SODIUM SERPL-SCNC: 141 MMOL/L — SIGNIFICANT CHANGE UP (ref 135–146)
WBC # BLD: 8.64 K/UL — SIGNIFICANT CHANGE UP (ref 4.8–10.8)
WBC # FLD AUTO: 8.64 K/UL — SIGNIFICANT CHANGE UP (ref 4.8–10.8)

## 2022-09-23 PROCEDURE — 99222 1ST HOSP IP/OBS MODERATE 55: CPT

## 2022-09-23 PROCEDURE — 99233 SBSQ HOSP IP/OBS HIGH 50: CPT

## 2022-09-23 RX ADMIN — ENTACAPONE 200 MILLIGRAM(S): 200 TABLET, FILM COATED ORAL at 11:08

## 2022-09-23 RX ADMIN — Medication 100 MILLIGRAM(S): at 11:08

## 2022-09-23 RX ADMIN — CARBIDOPA AND LEVODOPA 2 TABLET(S): 25; 100 TABLET ORAL at 22:30

## 2022-09-23 RX ADMIN — LISINOPRIL 40 MILLIGRAM(S): 2.5 TABLET ORAL at 05:30

## 2022-09-23 RX ADMIN — ENTACAPONE 200 MILLIGRAM(S): 200 TABLET, FILM COATED ORAL at 05:30

## 2022-09-23 RX ADMIN — ENOXAPARIN SODIUM 40 MILLIGRAM(S): 100 INJECTION SUBCUTANEOUS at 22:30

## 2022-09-23 RX ADMIN — LEVETIRACETAM 500 MILLIGRAM(S): 250 TABLET, FILM COATED ORAL at 17:08

## 2022-09-23 RX ADMIN — ENTACAPONE 200 MILLIGRAM(S): 200 TABLET, FILM COATED ORAL at 23:08

## 2022-09-23 RX ADMIN — Medication 10 MILLIGRAM(S): at 22:30

## 2022-09-23 RX ADMIN — LEVETIRACETAM 500 MILLIGRAM(S): 250 TABLET, FILM COATED ORAL at 05:30

## 2022-09-23 RX ADMIN — ENTACAPONE 200 MILLIGRAM(S): 200 TABLET, FILM COATED ORAL at 17:08

## 2022-09-23 RX ADMIN — Medication 81 MILLIGRAM(S): at 11:08

## 2022-09-23 RX ADMIN — CARBIDOPA AND LEVODOPA 2 TABLET(S): 25; 100 TABLET ORAL at 05:30

## 2022-09-23 RX ADMIN — CARVEDILOL PHOSPHATE 12.5 MILLIGRAM(S): 80 CAPSULE, EXTENDED RELEASE ORAL at 05:31

## 2022-09-23 RX ADMIN — CARBIDOPA AND LEVODOPA 2 TABLET(S): 25; 100 TABLET ORAL at 14:08

## 2022-09-23 RX ADMIN — CARVEDILOL PHOSPHATE 12.5 MILLIGRAM(S): 80 CAPSULE, EXTENDED RELEASE ORAL at 17:08

## 2022-09-23 RX ADMIN — ENTACAPONE 200 MILLIGRAM(S): 200 TABLET, FILM COATED ORAL at 00:54

## 2022-09-23 NOTE — PATIENT PROFILE ADULT - FALL HARM RISK - HARM RISK INTERVENTIONS

## 2022-09-23 NOTE — PROGRESS NOTE ADULT - SUBJECTIVE AND OBJECTIVE BOX
pt seen and examined.     sp syncope     ROS: no cp, no sob, no n/v, no fever    PAST MEDICAL & SURGICAL HISTORY:  Parkinson disease  CVA (cerebral vascular accident)  HTN (hypertension)  Diabetes mellitus  Gout    Vital Signs Last 24 Hrs  T(C): 36.7 (23 Sep 2022 14:16), Max: 37.6 (23 Sep 2022 05:00)  T(F): 98 (23 Sep 2022 14:16), Max: 99.7 (23 Sep 2022 05:00)  HR: 78 (23 Sep 2022 14:16) (71 - 81)  BP: 131/77 (23 Sep 2022 14:16) (130/71 - 178/94)  BP(mean): --  RR: 18 (23 Sep 2022 14:16) (16 - 18)  SpO2: 99% (23 Sep 2022 05:00) (95% - 99%)    Parameters below as of 23 Sep 2022 01:29  Patient On (Oxygen Delivery Method): room air        physical exam  constitutional NAD, AAOX3, Respiratory  lungs CTA, CVS heart RRR, GI: abdomen Soft NT, ND, BS+, skin: intact  neuro exam Motor, sensory and CN normal, no deficit     MEDICATIONS  (STANDING):  aspirin enteric coated 81 milliGRAM(s) Oral daily  carbidopa/levodopa  25/100 2 Tablet(s) Oral three times a day  carvedilol 12.5 milliGRAM(s) Oral every 12 hours  enoxaparin Injectable 40 milliGRAM(s) SubCutaneous every 24 hours  entacapone 200 milliGRAM(s) Oral four times a day  levETIRAcetam 500 milliGRAM(s) Oral two times a day  lisinopril 40 milliGRAM(s) Oral daily  melatonin 10 milliGRAM(s) Oral at bedtime  traZODone 100 milliGRAM(s) Oral daily    MEDICATIONS  (PRN):      CAPILLARY BLOOD GLUCOSE      POCT Blood Glucose.: 189 mg/dL (23 Sep 2022 16:23)  POCT Blood Glucose.: 103 mg/dL (23 Sep 2022 11:15)  POCT Blood Glucose.: 85 mg/dL (23 Sep 2022 07:32)  POCT Blood Glucose.: 100 mg/dL (23 Sep 2022 01:35)                          13.8   8.64  )-----------( 125      ( 23 Sep 2022 08:11 )             38.2     09-23    141  |  106  |  13  ----------------------------<  74  4.1   |  24  |  1.0    Ca    8.1<L>      23 Sep 2022 08:11  Mg     1.8     09-23    TPro  5.6<L>  /  Alb  3.3<L>  /  TBili  0.9  /  DBili  x   /  AST  11  /  ALT  <5  /  AlkPhos  61  09-23      COVID-19 PCR: NotDetec (09-22-22 @ 13:12)      CARDIAC MARKERS ( 22 Sep 2022 14:56 )  x     / <0.01 ng/mL / x     / x     / x        < from: CT Head No Cont (09.22.22 @ 13:48) >    1.  No evidence of acute intracranial pathology. Stable exam since   5/12/2020.    2.  Stable moderate/severe chronic microvascular changes and chronic   lacunar infarcts.    < end of copied text >    < from: MR Head No Cont (07.03.19 @ 14:50) >    No evidence of acute intracranial pathology.    Moderate chronic microvascular ischemic changes and chronic lacunar   infarctions.    < end of copied text >    neurology consult appreciated:   REEG  fall safety precautions  obtain orthostatic bp  Cardio work up for syncope        a/p  # syncope  rule out seizure   fu neurology   cardio workup   transfer to tele     # Parkinson disease cont sinemet    # DM cont meds     # HX of cva, HTN cont meds    #Progress Note Handoff  Pending (specify): transfer to tele   Family discussion: raf pt   Disposition: assisted living when stable                pt seen and examined.     sp syncope     ROS: no cp, no sob, no n/v, no fever    PAST MEDICAL & SURGICAL HISTORY:  Parkinson disease  CVA (cerebral vascular accident)  HTN (hypertension)  Diabetes mellitus  Gout    Vital Signs Last 24 Hrs  T(C): 36.7 (23 Sep 2022 14:16), Max: 37.6 (23 Sep 2022 05:00)  T(F): 98 (23 Sep 2022 14:16), Max: 99.7 (23 Sep 2022 05:00)  HR: 78 (23 Sep 2022 14:16) (71 - 81)  BP: 131/77 (23 Sep 2022 14:16) (130/71 - 178/94)  BP(mean): --  RR: 18 (23 Sep 2022 14:16) (16 - 18)  SpO2: 99% (23 Sep 2022 05:00) (95% - 99%)    Parameters below as of 23 Sep 2022 01:29  Patient On (Oxygen Delivery Method): room air        physical exam  constitutional NAD, AAOX3, Respiratory  lungs CTA, CVS heart RRR, GI: abdomen Soft NT, ND, BS+, skin: intact  neuro exam Motor, sensory and CN normal, no deficit     MEDICATIONS  (STANDING):  aspirin enteric coated 81 milliGRAM(s) Oral daily  carbidopa/levodopa  25/100 2 Tablet(s) Oral three times a day  carvedilol 12.5 milliGRAM(s) Oral every 12 hours  enoxaparin Injectable 40 milliGRAM(s) SubCutaneous every 24 hours  entacapone 200 milliGRAM(s) Oral four times a day  levETIRAcetam 500 milliGRAM(s) Oral two times a day  lisinopril 40 milliGRAM(s) Oral daily  melatonin 10 milliGRAM(s) Oral at bedtime  traZODone 100 milliGRAM(s) Oral daily    MEDICATIONS  (PRN):      CAPILLARY BLOOD GLUCOSE      POCT Blood Glucose.: 189 mg/dL (23 Sep 2022 16:23)  POCT Blood Glucose.: 103 mg/dL (23 Sep 2022 11:15)  POCT Blood Glucose.: 85 mg/dL (23 Sep 2022 07:32)  POCT Blood Glucose.: 100 mg/dL (23 Sep 2022 01:35)                          13.8   8.64  )-----------( 125      ( 23 Sep 2022 08:11 )             38.2     09-23    141  |  106  |  13  ----------------------------<  74  4.1   |  24  |  1.0    Ca    8.1<L>      23 Sep 2022 08:11  Mg     1.8     09-23    TPro  5.6<L>  /  Alb  3.3<L>  /  TBili  0.9  /  DBili  x   /  AST  11  /  ALT  <5  /  AlkPhos  61  09-23      COVID-19 PCR: NotDetec (09-22-22 @ 13:12)      CARDIAC MARKERS ( 22 Sep 2022 14:56 )  x     / <0.01 ng/mL / x     / x     / x        < from: CT Head No Cont (09.22.22 @ 13:48) >    1.  No evidence of acute intracranial pathology. Stable exam since   5/12/2020.    2.  Stable moderate/severe chronic microvascular changes and chronic   lacunar infarcts.    < end of copied text >    < from: MR Head No Cont (07.03.19 @ 14:50) >    No evidence of acute intracranial pathology.    Moderate chronic microvascular ischemic changes and chronic lacunar   infarctions.    < end of copied text >    neurology consult appreciated:   REEG  fall safety precautions  obtain orthostatic bp  Cardio work up for syncope    a/p    HPI:  72 M with PMH of Parkinson's disease, gout, HTN, CVA ('98 and '16), DM, presenting for altered mental status and syncope.   Per patient, he was plugging something in at his assisted living facility, then fall over on to ground. He doesn't remember if he lost consciousness, or how long he was on the floor, but reports someone found him. He denies any prodromal nausea or vomiting before the incident, denies incontinence or loss of control over bowels before incident.  Per chart, patient had 2x falls, and inability to recollect incident.     # syncope ( with LOC)   cont keppra, check keppra level   rule out seizure   fu neurology   cardio workup   transfer to tele     # Parkinson disease cont sinemet    # DM cont meds     # HX of cva, HTN cont meds    #Progress Note Handoff  Pending (specify): transfer to tele   Family discussion: raf pt   Disposition: assisted living when stable

## 2022-09-23 NOTE — CHART NOTE - NSCHARTNOTEFT_GEN_A_CORE
Hospital course   A 72 M with PMH of Parkinson's disease, gout, HTN, CVA ('98 and '16), DM, presented to the hosptial for altered mental status and syncope. Per patient, he was plugging a lamp in at his assisted living facility, then fall over on to ground. patient does not hit his head, He doesn't remember if he lost consciousness, or how long he was on the floor, but reports someone found him. He denies any prodromal nausea or vomiting before the incident, denies incontinence or loss of control over bowels before incident.  Per chart, patient had 2x falls, and inability to recollect incident.     ED Course:   T 99, HR 95, /85, RR 20, 95% on RA   CT Head: No acute pathology; mod-severe microvascular changes, chronic lacunar infarcts   CT C Spine: No acute fracture   X Ray Pelvis: no acute fracture; osteoarthritis     Vital Signs Last 24 Hrs  T(C): 37.6 (23 Sep 2022 05:00), Max: 37.6 (23 Sep 2022 05:00)  T(F): 99.7 (23 Sep 2022 05:00), Max: 99.7 (23 Sep 2022 05:00)  HR: 71 (23 Sep 2022 05:00) (71 - 95)  BP: 130/71 (23 Sep 2022 05:00) (130/71 - 178/94)  BP(mean): --  RR: 17 (23 Sep 2022 05:00) (16 - 20)  SpO2: 99% (23 Sep 2022 05:00) (95% - 99%)  Parameters below as of 23 Sep 2022 01:29  Patient On (Oxygen Delivery Method): room air    LABS:                        13.8   8.64  )-----------( 125      ( 23 Sep 2022 08:11 )             38.2     09-23    141  |  106  |  13  ----------------------------<  74  4.1   |  24  |  1.0    Ca    8.1<L>      23 Sep 2022 08:11  Mg     1.8     09-23    TPro  5.6<L>  /  Alb  3.3<L>  /  TBili  0.9  /  DBili  x   /  AST  11  /  ALT  x   /  AlkPhos  61  09-23        Physical Exam: pt is comfortable in bed; no acute distress; well-groomed  HEENT: Full ROM in bilateral eyes; pupils symmetrical and equal in size; neck supple  Respiratory: (+) sounds in all lung fields; no crackles or wheezes appreciated  Cardiovascular: S1 S2 regular rate and rhythm; no murmurs or gallops appreciated  Gastrointestinal:  abdomen is non-distended, non-tender to superficial and deep palpation  Neurological: AxO x3 to self, place and year  Skin: no rashes      Patient was admitted for fall, from suspect syncope.   patient will be transferred ti telemetry for workup of cardiogenic syncope     # Fall 2/2 suspect Syncope   # Hx of seizures   - CT Head: No acute pathology; mod-severe microvascular changes, chronic lacunar infarcts   - CT C Spine: No acute fracture   - X Ray Pelvis: no acute fracture; osteoarthritis   - f/u Keppra level   - FU Orthostatics  - FU TTE   -  Neurology saw the patient, they recommended a cardiogenic workup   - EEG showed diffuse slowing and diffuse cerebral electrophysiological dysfunction     # Asymptomatic Bacturia   - UA (+)   - s/p aztreonam in ED   - not currently complaining of symptoms   - monitor off abiox for now     # HTN   - c/w lisinopril     # DM   - hold home metformin   - check fingersticks, start insulin prn   - Check A1c     # Hx of CVA (2 strokes in past)   - per patient, no residual deficits     # Parkinson   - c/w Entacapone   - c/w cabidopa-levidopa   - RIVASTIGMINE held, not available in our pharmacy     # Insomnia   - c/w trazodone   - c/w melatonin

## 2022-09-23 NOTE — ED ADULT NURSE REASSESSMENT NOTE - NS ED NURSE REASSESS COMMENT FT1
recvd pt from day rn, Pt in no apparent distress at this time. Airway patent, breathing spontaneous and nonlabored. Pt A&Ox3 resting in stretcher. Pt no complaints at this time

## 2022-09-23 NOTE — PHYSICAL THERAPY INITIAL EVALUATION ADULT - CRITERIA FOR SKILLED THERAPEUTIC INTERVENTIONS
Pt d/c from PT, does not need acute skilled PT, can ambulate on unit with unit staff/anticipated equipment needs at discharge
No

## 2022-09-24 LAB
ALBUMIN SERPL ELPH-MCNC: 3 G/DL — LOW (ref 3.5–5.2)
ALP SERPL-CCNC: 56 U/L — SIGNIFICANT CHANGE UP (ref 30–115)
ALT FLD-CCNC: <5 U/L — SIGNIFICANT CHANGE UP (ref 0–41)
AMPHET UR-MCNC: NEGATIVE — SIGNIFICANT CHANGE UP
ANION GAP SERPL CALC-SCNC: 10 MMOL/L — SIGNIFICANT CHANGE UP (ref 7–14)
AST SERPL-CCNC: 9 U/L — SIGNIFICANT CHANGE UP (ref 0–41)
BARBITURATES UR SCN-MCNC: NEGATIVE — SIGNIFICANT CHANGE UP
BASOPHILS # BLD AUTO: 0.02 K/UL — SIGNIFICANT CHANGE UP (ref 0–0.2)
BASOPHILS NFR BLD AUTO: 0.3 % — SIGNIFICANT CHANGE UP (ref 0–1)
BENZODIAZ UR-MCNC: NEGATIVE — SIGNIFICANT CHANGE UP
BILIRUB SERPL-MCNC: 0.5 MG/DL — SIGNIFICANT CHANGE UP (ref 0.2–1.2)
BUN SERPL-MCNC: 12 MG/DL — SIGNIFICANT CHANGE UP (ref 10–20)
CALCIUM SERPL-MCNC: 8.1 MG/DL — LOW (ref 8.4–10.5)
CHLORIDE SERPL-SCNC: 105 MMOL/L — SIGNIFICANT CHANGE UP (ref 98–110)
CO2 SERPL-SCNC: 24 MMOL/L — SIGNIFICANT CHANGE UP (ref 17–32)
COCAINE METAB.OTHER UR-MCNC: NEGATIVE — SIGNIFICANT CHANGE UP
CREAT SERPL-MCNC: 0.9 MG/DL — SIGNIFICANT CHANGE UP (ref 0.7–1.5)
EGFR: 92 ML/MIN/1.73M2 — SIGNIFICANT CHANGE UP
EOSINOPHIL # BLD AUTO: 0.16 K/UL — SIGNIFICANT CHANGE UP (ref 0–0.7)
EOSINOPHIL NFR BLD AUTO: 2.4 % — SIGNIFICANT CHANGE UP (ref 0–8)
GLUCOSE BLDC GLUCOMTR-MCNC: 124 MG/DL — HIGH (ref 70–99)
GLUCOSE BLDC GLUCOMTR-MCNC: 130 MG/DL — HIGH (ref 70–99)
GLUCOSE BLDC GLUCOMTR-MCNC: 146 MG/DL — HIGH (ref 70–99)
GLUCOSE BLDC GLUCOMTR-MCNC: 89 MG/DL — SIGNIFICANT CHANGE UP (ref 70–99)
GLUCOSE SERPL-MCNC: 88 MG/DL — SIGNIFICANT CHANGE UP (ref 70–99)
HCT VFR BLD CALC: 35.9 % — LOW (ref 42–52)
HGB BLD-MCNC: 12.8 G/DL — LOW (ref 14–18)
IMM GRANULOCYTES NFR BLD AUTO: 0.4 % — HIGH (ref 0.1–0.3)
LYMPHOCYTES # BLD AUTO: 1.91 K/UL — SIGNIFICANT CHANGE UP (ref 1.2–3.4)
LYMPHOCYTES # BLD AUTO: 28.2 % — SIGNIFICANT CHANGE UP (ref 20.5–51.1)
MAGNESIUM SERPL-MCNC: 1.9 MG/DL — SIGNIFICANT CHANGE UP (ref 1.8–2.4)
MCHC RBC-ENTMCNC: 33.6 PG — HIGH (ref 27–31)
MCHC RBC-ENTMCNC: 35.7 G/DL — SIGNIFICANT CHANGE UP (ref 32–37)
MCV RBC AUTO: 94.2 FL — HIGH (ref 80–94)
METHADONE UR-MCNC: NEGATIVE — SIGNIFICANT CHANGE UP
MONOCYTES # BLD AUTO: 0.86 K/UL — HIGH (ref 0.1–0.6)
MONOCYTES NFR BLD AUTO: 12.7 % — HIGH (ref 1.7–9.3)
NEUTROPHILS # BLD AUTO: 3.79 K/UL — SIGNIFICANT CHANGE UP (ref 1.4–6.5)
NEUTROPHILS NFR BLD AUTO: 56 % — SIGNIFICANT CHANGE UP (ref 42.2–75.2)
NRBC # BLD: 0 /100 WBCS — SIGNIFICANT CHANGE UP (ref 0–0)
OPIATES UR-MCNC: NEGATIVE — SIGNIFICANT CHANGE UP
PCP SPEC-MCNC: SIGNIFICANT CHANGE UP
PHOSPHATE SERPL-MCNC: 3.2 MG/DL — SIGNIFICANT CHANGE UP (ref 2.1–4.9)
PLATELET # BLD AUTO: 130 K/UL — SIGNIFICANT CHANGE UP (ref 130–400)
POTASSIUM SERPL-MCNC: 3.8 MMOL/L — SIGNIFICANT CHANGE UP (ref 3.5–5)
POTASSIUM SERPL-SCNC: 3.8 MMOL/L — SIGNIFICANT CHANGE UP (ref 3.5–5)
PROPOXYPHENE QUALITATIVE URINE RESULT: NEGATIVE — SIGNIFICANT CHANGE UP
PROT SERPL-MCNC: 5.3 G/DL — LOW (ref 6–8)
RBC # BLD: 3.81 M/UL — LOW (ref 4.7–6.1)
RBC # FLD: 11.3 % — LOW (ref 11.5–14.5)
SODIUM SERPL-SCNC: 139 MMOL/L — SIGNIFICANT CHANGE UP (ref 135–146)
WBC # BLD: 6.77 K/UL — SIGNIFICANT CHANGE UP (ref 4.8–10.8)
WBC # FLD AUTO: 6.77 K/UL — SIGNIFICANT CHANGE UP (ref 4.8–10.8)

## 2022-09-24 PROCEDURE — 99232 SBSQ HOSP IP/OBS MODERATE 35: CPT

## 2022-09-24 RX ADMIN — ENOXAPARIN SODIUM 40 MILLIGRAM(S): 100 INJECTION SUBCUTANEOUS at 22:17

## 2022-09-24 RX ADMIN — CARVEDILOL PHOSPHATE 12.5 MILLIGRAM(S): 80 CAPSULE, EXTENDED RELEASE ORAL at 05:30

## 2022-09-24 RX ADMIN — Medication 81 MILLIGRAM(S): at 12:45

## 2022-09-24 RX ADMIN — ENTACAPONE 200 MILLIGRAM(S): 200 TABLET, FILM COATED ORAL at 17:34

## 2022-09-24 RX ADMIN — ENTACAPONE 200 MILLIGRAM(S): 200 TABLET, FILM COATED ORAL at 05:26

## 2022-09-24 RX ADMIN — CARBIDOPA AND LEVODOPA 2 TABLET(S): 25; 100 TABLET ORAL at 06:37

## 2022-09-24 RX ADMIN — CARVEDILOL PHOSPHATE 12.5 MILLIGRAM(S): 80 CAPSULE, EXTENDED RELEASE ORAL at 17:34

## 2022-09-24 RX ADMIN — LEVETIRACETAM 500 MILLIGRAM(S): 250 TABLET, FILM COATED ORAL at 05:30

## 2022-09-24 RX ADMIN — LEVETIRACETAM 500 MILLIGRAM(S): 250 TABLET, FILM COATED ORAL at 17:34

## 2022-09-24 RX ADMIN — Medication 100 MILLIGRAM(S): at 12:45

## 2022-09-24 RX ADMIN — ENTACAPONE 200 MILLIGRAM(S): 200 TABLET, FILM COATED ORAL at 23:21

## 2022-09-24 RX ADMIN — LISINOPRIL 40 MILLIGRAM(S): 2.5 TABLET ORAL at 06:38

## 2022-09-24 RX ADMIN — CARBIDOPA AND LEVODOPA 2 TABLET(S): 25; 100 TABLET ORAL at 22:17

## 2022-09-24 RX ADMIN — Medication 10 MILLIGRAM(S): at 22:18

## 2022-09-24 RX ADMIN — ENTACAPONE 200 MILLIGRAM(S): 200 TABLET, FILM COATED ORAL at 12:45

## 2022-09-24 RX ADMIN — CARBIDOPA AND LEVODOPA 2 TABLET(S): 25; 100 TABLET ORAL at 13:05

## 2022-09-24 NOTE — PROGRESS NOTE ADULT - SUBJECTIVE AND OBJECTIVE BOX
ZAKIA MILLS  72y Male    Patient is a 72y old  Male who presents with a chief complaint of fall and syncope    INTERVAL HPI/OVERNIGHT EVENTS:    ROS: Pt denies fever, chills, SOB, palpitations, nausea, vomiting, abdominal pain, dysuria, diarrhea, constipation, rash, muscle or joint pain.    Overnight events: No acute events overnight.    Vital Signs Last 24 Hrs  T(C): 36.4 (24 Sep 2022 13:49), Max: 36.4 (24 Sep 2022 13:49)  T(F): 97.6 (24 Sep 2022 13:49), Max: 97.6 (24 Sep 2022 13:49)  HR: 66 (24 Sep 2022 13:49) (59 - 66)  BP: 153/86 (24 Sep 2022 13:49) (144/77 - 153/86)  BP(mean): --  RR: 19 (24 Sep 2022 13:49) (18 - 19)  SpO2: 99% (24 Sep 2022 13:49) (99% - 99%)    Parameters below as of 24 Sep 2022 13:49  Patient On (Oxygen Delivery Method): room air        cefaclor (Unknown)  penicillin (Unknown)  sulfa drugs (Unknown)      MEDICATIONS  (STANDING):  aspirin enteric coated 81 milliGRAM(s) Oral daily  carbidopa/levodopa  25/100 2 Tablet(s) Oral three times a day  carvedilol 12.5 milliGRAM(s) Oral every 12 hours  enoxaparin Injectable 40 milliGRAM(s) SubCutaneous every 24 hours  entacapone 200 milliGRAM(s) Oral four times a day  levETIRAcetam 500 milliGRAM(s) Oral two times a day  lisinopril 40 milliGRAM(s) Oral daily  melatonin 10 milliGRAM(s) Oral at bedtime  traZODone 100 milliGRAM(s) Oral daily    MEDICATIONS  (PRN):    Telemetry - no events    PHYSICAL EXAM:  General Appearance: NAD, normal for age and gender. 	  Neck: Normal JVP, no bruit.   Eyes: Conjunctiva clear, Extra Ocular muscles intact. No scleral icterus.  ENMT: Moist oral mucosa. No oral lesion.  Cardiovascular: Regular rate and rhythm S1 S2, No JVD, systolic murmur.  Respiratory: Lungs clear to auscultation. No wheezes, rales or rhonchi.  Psychiatry: Alert and oriented x 3, Mood & affect appropriate.  Gastrointestinal:  Soft, Non-tender, Non-distended.  Neurologic: Non-focal deficits. Tremors noted in rest in bilateral hand.  Musculoskeletal/ extremities: Normal range of motion, No clubbing, cyanosis or edema.  Vascular: Peripheral pulses palpable bilaterally.  Skin/Integumen: No rashes, No ecchymoses, No cyanosis.    LABS:                        12.8   6.77  )-----------( 130      ( 24 Sep 2022 06:29 )             35.9     09-24    139  |  105  |  12  ----------------------------<  88  3.8   |  24  |  0.9    Ca    8.1<L>      24 Sep 2022 06:29  Phos  3.2     09-24  Mg     1.9     09-24    TPro  5.3<L>  /  Alb  3.0<L>  /  TBili  0.5  /  DBili  x   /  AST  9   /  ALT  <5  /  AlkPhos  56  09-24      Culture - Urine (collected 22 Sep 2022 15:00)  Source: Clean Catch Clean Catch (Midstream)  Preliminary Report (24 Sep 2022 14:16):    >100,000 CFU/ml Enterococcus species        RADIOLOGY & ADDITIONAL TESTS:               ZAKIA MILLS  72y Male    Patient is a 72y old  Male who presents with a chief complaint of fall and syncope    INTERVAL HPI/OVERNIGHT EVENTS:    ROS: Pt denies fever, chills, SOB, palpitations, nausea, vomiting, abdominal pain, dysuria, diarrhea, constipation, rash, muscle or joint pain.    Overnight events: No acute events overnight.    Vital Signs Last 24 Hrs  T(C): 36.4 (24 Sep 2022 13:49), Max: 36.4 (24 Sep 2022 13:49)  T(F): 97.6 (24 Sep 2022 13:49), Max: 97.6 (24 Sep 2022 13:49)  HR: 66 (24 Sep 2022 13:49) (59 - 66)  BP: 153/86 (24 Sep 2022 13:49) (144/77 - 153/86)  BP(mean): --  RR: 19 (24 Sep 2022 13:49) (18 - 19)  SpO2: 99% (24 Sep 2022 13:49) (99% - 99%)    Parameters below as of 24 Sep 2022 13:49  Patient On (Oxygen Delivery Method): room air        cefaclor (Unknown)  penicillin (Unknown)  sulfa drugs (Unknown)      MEDICATIONS  (STANDING):  aspirin enteric coated 81 milliGRAM(s) Oral daily  carbidopa/levodopa  25/100 2 Tablet(s) Oral three times a day  carvedilol 12.5 milliGRAM(s) Oral every 12 hours  enoxaparin Injectable 40 milliGRAM(s) SubCutaneous every 24 hours  entacapone 200 milliGRAM(s) Oral four times a day  levETIRAcetam 500 milliGRAM(s) Oral two times a day  lisinopril 40 milliGRAM(s) Oral daily  melatonin 10 milliGRAM(s) Oral at bedtime  traZODone 100 milliGRAM(s) Oral daily    MEDICATIONS  (PRN):    Telemetry - no events    PHYSICAL EXAM:  General Appearance: NAD, normal for age and gender. 	  Neck: Normal JVP, no bruit.   Eyes: Conjunctiva clear, Extra Ocular muscles intact. No scleral icterus.  ENMT: Moist oral mucosa. No oral lesion.  Cardiovascular: Regular rate and rhythm S1 S2, No JVD, systolic murmur.  Respiratory: Lungs clear to auscultation. No wheezes, rales or rhonchi.  Psychiatry: Alert and oriented x 3, Mood & affect appropriate.  Gastrointestinal:  Soft, Non-tender, Non-distended.  Neurologic: Non-focal deficits. Tremors noted in rest in bilateral hand.  Musculoskeletal/ extremities: Normal range of motion, No clubbing, cyanosis or edema.  Vascular: Peripheral pulses palpable bilaterally.  Skin/Integumen: No rashes, No ecchymoses, No cyanosis.    LABS:                        12.8   6.77  )-----------( 130      ( 24 Sep 2022 06:29 )             35.9     09-24    139  |  105  |  12  ----------------------------<  88  3.8   |  24  |  0.9    Ca    8.1<L>      24 Sep 2022 06:29  Phos  3.2     09-24  Mg     1.9     09-24    TPro  5.3<L>  /  Alb  3.0<L>  /  TBili  0.5  /  DBili  x   /  AST  9   /  ALT  <5  /  AlkPhos  56  09-24      Culture - Urine (collected 22 Sep 2022 15:00)  Source: Clean Catch Clean Catch (Midstream)  Preliminary Report (24 Sep 2022 14:16):    >100,000 CFU/ml Enterococcus species        RADIOLOGY & ADDITIONAL TESTS:  < from: EEG (09.22.22 @ 20:50) >  Impression:  Normal

## 2022-09-24 NOTE — PROGRESS NOTE ADULT - ASSESSMENT
72 M with PMH of Parkinson's disease, gout, HTN, CVA ('98 and '16), DM, hx of seizure, presenting for altered mental status and syncope.   Patient admitted for fall, from suspect syncope.     # Fall 2/2 suspect syncope   # Hx of seizures   - CT Head: No acute pathology; mod-severe microvascular changes, chronic lacunar infarcts   - CT C Spine: No acute fracture   - X Ray Pelvis: no acute fracture; osteoarthritis   - c/w telemetry  - f/u Keppra level (ordered)   - check EEG   - check orthostatics  - check TTE   - follow up neurology    # Asymptomatic Bacteruria   - UA (+)   - not currently complaining of symptoms   - monitor off abx for now     # HTN   - c/w lisinopril     # DM II  - hold home metformin   - check fingersticks start insulin prn   - check A1c     # Hx of CVA (2 strokes in past)   - per patient, no residual deficits     # Parkinson's disease  - c/w Entacapone   - c/w cabidopa-levidopa   - RIVASTIGMINE held, not available in our pharmacy     # Insomnia   - c/w trazodone   - c/w melatonin     # DVT prophylaxis - on lovenox subcut  # Code status - Full Code   72 M with PMH of Parkinson's disease, gout, HTN, CVA ('98 and '16), DM, hx of seizure, presenting for altered mental status and syncope.   Patient admitted for fall, from suspect syncope.     # Fall 2/2 suspected syncope   # h/o Seizures   - CT Head: No acute pathology; mod-severe microvascular changes, chronic lacunar infarcts   - CT C Spine: No acute fracture   - X Ray Pelvis: no acute fracture; osteoarthritis   - EEG negative  - c/w telemetry  - f/u Keppra level (ordered)   - check orthostatics  - check TTE   - follow up neurology    # Asymptomatic Bacteruria   - UA (+)   - not currently complaining of symptoms   - monitor off abx for now     # HTN, stable  - c/w lisinopril     # DM II, controlled, A1c 5.3   - hold home metformin   - check fingersticks start insulin sliding scale if fsg>180    # Hx of CVA (2 strokes in past)   - per patient, no residual deficits     # Parkinson's disease  - c/w entacapone   - c/w cabidopa-levidopa   - RIVASTIGMINE held, not available in our pharmacy     # Insomnia   - c/w trazodone   - c/w melatonin     # DVT prophylaxis - on lovenox subcut  # Code status - Full Code

## 2022-09-25 LAB
-  AMPICILLIN: SIGNIFICANT CHANGE UP
-  CIPROFLOXACIN: SIGNIFICANT CHANGE UP
-  LEVOFLOXACIN: SIGNIFICANT CHANGE UP
-  NITROFURANTOIN: SIGNIFICANT CHANGE UP
-  TETRACYCLINE: SIGNIFICANT CHANGE UP
-  VANCOMYCIN: SIGNIFICANT CHANGE UP
ALBUMIN SERPL ELPH-MCNC: 3.1 G/DL — LOW (ref 3.5–5.2)
ALP SERPL-CCNC: 62 U/L — SIGNIFICANT CHANGE UP (ref 30–115)
ALT FLD-CCNC: <5 U/L — SIGNIFICANT CHANGE UP (ref 0–41)
ANION GAP SERPL CALC-SCNC: 10 MMOL/L — SIGNIFICANT CHANGE UP (ref 7–14)
AST SERPL-CCNC: 10 U/L — SIGNIFICANT CHANGE UP (ref 0–41)
BASOPHILS # BLD AUTO: 0.02 K/UL — SIGNIFICANT CHANGE UP (ref 0–0.2)
BASOPHILS NFR BLD AUTO: 0.3 % — SIGNIFICANT CHANGE UP (ref 0–1)
BILIRUB SERPL-MCNC: 0.5 MG/DL — SIGNIFICANT CHANGE UP (ref 0.2–1.2)
BUN SERPL-MCNC: 10 MG/DL — SIGNIFICANT CHANGE UP (ref 10–20)
CALCIUM SERPL-MCNC: 8.3 MG/DL — LOW (ref 8.4–10.5)
CHLORIDE SERPL-SCNC: 112 MMOL/L — HIGH (ref 98–110)
CO2 SERPL-SCNC: 27 MMOL/L — SIGNIFICANT CHANGE UP (ref 17–32)
CREAT SERPL-MCNC: 0.9 MG/DL — SIGNIFICANT CHANGE UP (ref 0.7–1.5)
CULTURE RESULTS: SIGNIFICANT CHANGE UP
EGFR: 92 ML/MIN/1.73M2 — SIGNIFICANT CHANGE UP
EOSINOPHIL # BLD AUTO: 0.1 K/UL — SIGNIFICANT CHANGE UP (ref 0–0.7)
EOSINOPHIL NFR BLD AUTO: 1.4 % — SIGNIFICANT CHANGE UP (ref 0–8)
GLUCOSE BLDC GLUCOMTR-MCNC: 104 MG/DL — HIGH (ref 70–99)
GLUCOSE BLDC GLUCOMTR-MCNC: 147 MG/DL — HIGH (ref 70–99)
GLUCOSE BLDC GLUCOMTR-MCNC: 87 MG/DL — SIGNIFICANT CHANGE UP (ref 70–99)
GLUCOSE SERPL-MCNC: 94 MG/DL — SIGNIFICANT CHANGE UP (ref 70–99)
HCT VFR BLD CALC: 38.6 % — LOW (ref 42–52)
HGB BLD-MCNC: 13.9 G/DL — LOW (ref 14–18)
IMM GRANULOCYTES NFR BLD AUTO: 0.3 % — SIGNIFICANT CHANGE UP (ref 0.1–0.3)
LYMPHOCYTES # BLD AUTO: 1.73 K/UL — SIGNIFICANT CHANGE UP (ref 1.2–3.4)
LYMPHOCYTES # BLD AUTO: 24 % — SIGNIFICANT CHANGE UP (ref 20.5–51.1)
MAGNESIUM SERPL-MCNC: 1.7 MG/DL — LOW (ref 1.8–2.4)
MCHC RBC-ENTMCNC: 33.5 PG — HIGH (ref 27–31)
MCHC RBC-ENTMCNC: 36 G/DL — SIGNIFICANT CHANGE UP (ref 32–37)
MCV RBC AUTO: 93 FL — SIGNIFICANT CHANGE UP (ref 80–94)
METHOD TYPE: SIGNIFICANT CHANGE UP
MONOCYTES # BLD AUTO: 0.65 K/UL — HIGH (ref 0.1–0.6)
MONOCYTES NFR BLD AUTO: 9 % — SIGNIFICANT CHANGE UP (ref 1.7–9.3)
NEUTROPHILS # BLD AUTO: 4.68 K/UL — SIGNIFICANT CHANGE UP (ref 1.4–6.5)
NEUTROPHILS NFR BLD AUTO: 65 % — SIGNIFICANT CHANGE UP (ref 42.2–75.2)
NRBC # BLD: 0 /100 WBCS — SIGNIFICANT CHANGE UP (ref 0–0)
ORGANISM # SPEC MICROSCOPIC CNT: SIGNIFICANT CHANGE UP
ORGANISM # SPEC MICROSCOPIC CNT: SIGNIFICANT CHANGE UP
PHOSPHATE SERPL-MCNC: 3.1 MG/DL — SIGNIFICANT CHANGE UP (ref 2.1–4.9)
PLATELET # BLD AUTO: 155 K/UL — SIGNIFICANT CHANGE UP (ref 130–400)
POTASSIUM SERPL-MCNC: 4.4 MMOL/L — SIGNIFICANT CHANGE UP (ref 3.5–5)
POTASSIUM SERPL-SCNC: 4.4 MMOL/L — SIGNIFICANT CHANGE UP (ref 3.5–5)
PROT SERPL-MCNC: 5.7 G/DL — LOW (ref 6–8)
RBC # BLD: 4.15 M/UL — LOW (ref 4.7–6.1)
RBC # FLD: 11.2 % — LOW (ref 11.5–14.5)
SODIUM SERPL-SCNC: 149 MMOL/L — HIGH (ref 135–146)
SPECIMEN SOURCE: SIGNIFICANT CHANGE UP
WBC # BLD: 7.2 K/UL — SIGNIFICANT CHANGE UP (ref 4.8–10.8)
WBC # FLD AUTO: 7.2 K/UL — SIGNIFICANT CHANGE UP (ref 4.8–10.8)

## 2022-09-25 PROCEDURE — 99232 SBSQ HOSP IP/OBS MODERATE 35: CPT

## 2022-09-25 RX ORDER — MAGNESIUM SULFATE 500 MG/ML
2 VIAL (ML) INJECTION ONCE
Refills: 0 | Status: COMPLETED | OUTPATIENT
Start: 2022-09-25 | End: 2022-09-25

## 2022-09-25 RX ADMIN — ENTACAPONE 200 MILLIGRAM(S): 200 TABLET, FILM COATED ORAL at 17:02

## 2022-09-25 RX ADMIN — Medication 100 MILLIGRAM(S): at 12:37

## 2022-09-25 RX ADMIN — ENOXAPARIN SODIUM 40 MILLIGRAM(S): 100 INJECTION SUBCUTANEOUS at 22:18

## 2022-09-25 RX ADMIN — LISINOPRIL 40 MILLIGRAM(S): 2.5 TABLET ORAL at 05:43

## 2022-09-25 RX ADMIN — CARVEDILOL PHOSPHATE 12.5 MILLIGRAM(S): 80 CAPSULE, EXTENDED RELEASE ORAL at 05:43

## 2022-09-25 RX ADMIN — Medication 81 MILLIGRAM(S): at 12:36

## 2022-09-25 RX ADMIN — LEVETIRACETAM 500 MILLIGRAM(S): 250 TABLET, FILM COATED ORAL at 17:03

## 2022-09-25 RX ADMIN — Medication 25 GRAM(S): at 13:08

## 2022-09-25 RX ADMIN — ENTACAPONE 200 MILLIGRAM(S): 200 TABLET, FILM COATED ORAL at 05:42

## 2022-09-25 RX ADMIN — ENTACAPONE 200 MILLIGRAM(S): 200 TABLET, FILM COATED ORAL at 12:37

## 2022-09-25 RX ADMIN — CARVEDILOL PHOSPHATE 12.5 MILLIGRAM(S): 80 CAPSULE, EXTENDED RELEASE ORAL at 17:02

## 2022-09-25 RX ADMIN — Medication 10 MILLIGRAM(S): at 22:18

## 2022-09-25 RX ADMIN — LEVETIRACETAM 500 MILLIGRAM(S): 250 TABLET, FILM COATED ORAL at 05:43

## 2022-09-25 RX ADMIN — CARBIDOPA AND LEVODOPA 2 TABLET(S): 25; 100 TABLET ORAL at 05:42

## 2022-09-25 RX ADMIN — ENTACAPONE 200 MILLIGRAM(S): 200 TABLET, FILM COATED ORAL at 23:29

## 2022-09-25 RX ADMIN — CARBIDOPA AND LEVODOPA 2 TABLET(S): 25; 100 TABLET ORAL at 13:05

## 2022-09-25 RX ADMIN — CARBIDOPA AND LEVODOPA 2 TABLET(S): 25; 100 TABLET ORAL at 22:18

## 2022-09-25 NOTE — PROGRESS NOTE ADULT - ASSESSMENT
72 M with PMH of Parkinson's disease, gout, HTN, CVA ('98 and '16), DM, hx of seizure, presenting for altered mental status and syncope.   Patient admitted for fall, from suspect syncope.     # Fall 2/2 suspected syncope   # h/o Seizures   - CT Head: No acute pathology; mod-severe microvascular changes, chronic lacunar infarcts   - CT C Spine: No acute fracture   - X Ray Pelvis: no acute fracture; osteoarthritis   - EEG negative, orthostatics negative  - c/w telemetry  - f/u Keppra level (ordered)   - TTE pending, MR Head as per neurology  - follow up neurology    # Asymptomatic Bacteruria   - UA (+)   - not currently complaining of symptoms   - monitor off abx for now     # HTN, stable  - c/w lisinopril     # DM II, controlled, A1c 5.3   - hold home metformin   - check fingersticks start insulin sliding scale if fsg>180    # Hx of CVA (2 strokes in past)   - per patient, no residual deficits     # Parkinson's disease  - c/w entacapone   - c/w cabidopa-levidopa   - RIVASTIGMINE held, not available in our pharmacy     # Insomnia   - c/w trazodone   - c/w melatonin     # DVT prophylaxis - on lovenox subcut  # Code status - Full code

## 2022-09-25 NOTE — PROGRESS NOTE ADULT - ASSESSMENT
72 M with PMH of Parkinson's disease, gout, HTN, CVA ('98 and '16), DM, hx of seizure, presenting for altered mental status and syncope.   Patient admitted for fall, from suspect syncope.     # Fall 2/2 suspected syncope   # h/o Seizures   - CT Head: No acute pathology; mod-severe microvascular changes, chronic lacunar infarcts   - CT C Spine: No acute fracture   - X Ray Pelvis: no acute fracture; osteoarthritis   - EEG negative  - c/w telemetry  - f/u Keppra level (ordered)   - check orthostatics  - check TTE   - Neurology recs: MRI head, b12, folate, tsh    # Asymptomatic Bacteruria   - UA (+)   - not currently complaining of symptoms   - monitor off abx for now     # HTN, stable  - c/w lisinopril     # DM II, controlled, A1c 5.3   - hold home metformin   - check fingersticks start insulin sliding scale if fsg>180    # Hx of CVA (2 strokes in past)   - per patient, no residual deficits     # Parkinson's disease  - c/w entacapone   - c/w cabidopa-levidopa   - RIVASTIGMINE held, not available in our pharmacy, asked patient to bring his home pills    # Insomnia   - c/w trazodone   - c/w melatonin     # DVT prophylaxis - on lovenox subcut  # Code status - Full Code

## 2022-09-25 NOTE — PROGRESS NOTE ADULT - SUBJECTIVE AND OBJECTIVE BOX
GENE ZAKIA  72y Male    Patient is a 72y old  Male who presents with a chief complaint of fall and syncope    INTERVAL HPI/OVERNIGHT EVENTS:    ROS: Pt denies fever, chills, SOB, palpitations, nausea, vomiting, abdominal pain, dysuria, diarrhea, constipation, rash, muscle or joint pain.    Overnight events: No acute events overnight.    Vital Signs Last 24 Hrs  T(C): 36.3 (25 Sep 2022 14:00), Max: 36.3 (25 Sep 2022 14:00)  T(F): 97.4 (25 Sep 2022 14:00), Max: 97.4 (25 Sep 2022 14:00)  HR: 71 (25 Sep 2022 14:00) (62 - 71)  BP: 160/89 (25 Sep 2022 14:00) (153/91 - 160/89)  BP(mean): --  ABP: --  ABP(mean): --  RR: 18 (25 Sep 2022 14:00) (18 - 18)  SpO2: --    I&O's Summary    cefaclor (Unknown)  penicillin (Unknown)  sulfa drugs (Unknown)    MEDICATIONS  (STANDING):  aspirin enteric coated 81 milliGRAM(s) Oral daily  carbidopa/levodopa  25/100 2 Tablet(s) Oral three times a day  carvedilol 12.5 milliGRAM(s) Oral every 12 hours  enoxaparin Injectable 40 milliGRAM(s) SubCutaneous every 24 hours  entacapone 200 milliGRAM(s) Oral four times a day  levETIRAcetam 500 milliGRAM(s) Oral two times a day  lisinopril 40 milliGRAM(s) Oral daily  melatonin 10 milliGRAM(s) Oral at bedtime  traZODone 100 milliGRAM(s) Oral daily    MEDICATIONS  (PRN):    Telemetry - no events    PHYSICAL EXAM:  General Appearance: NAD, normal for age and gender. 	  Neck: Normal JVP, no bruit.   Eyes: Conjunctiva clear, Extra Ocular muscles intact. No scleral icterus.  ENMT: Moist oral mucosa. No oral lesion.  Cardiovascular: Regular rate and rhythm S1 S2, No JVD, systolic murmur.  Respiratory: Lungs clear to auscultation. No wheezes, rales or rhonchi.  Psychiatry: Alert and oriented x 3, Mood & affect appropriate.  Gastrointestinal:  Soft, Non-tender, Non-distended.  Neurologic: Non-focal deficits. Tremors noted in rest in bilateral hand.  Musculoskeletal/ extremities: Normal range of motion, No clubbing, cyanosis or edema.  Vascular: Peripheral pulses palpable bilaterally.  Skin/Integumen: No rashes, No ecchymoses, No cyanosis.    LABS:                        13.9   7.20  )-----------( 155      ( 25 Sep 2022 04:30 )             38.6     09-25    149<H>  |  112<H>  |  10  ----------------------------<  94  4.4   |  27  |  0.9    Ca    8.3<L>      25 Sep 2022 04:30  Phos  3.1     09-25  Mg     1.7     09-25    TPro  5.7<L>  /  Alb  3.1<L>  /  TBili  0.5  /  DBili  x   /  AST  10  /  ALT  <5  /  AlkPhos  62  09-25

## 2022-09-25 NOTE — PROGRESS NOTE ADULT - SUBJECTIVE AND OBJECTIVE BOX
ZAKIA MILLS 72y Male  MRN#: [id number]   Hospital Day: 3d    SUBJECTIVE  Patient is a 72y old Male who presents with a chief complaint of fall and syncope (24 Sep 2022 17:14)  Currently admitted to medicine with the primary diagnosis of Syncope      INTERVAL HPI AND OVERNIGHT EVENTS:  Patient was examined and seen at bedside. This morning he is resting comfortably in bed and reports no issues or overnight events.    OBJECTIVE  PAST MEDICAL & SURGICAL HISTORY  Parkinson disease    CVA (cerebral vascular accident)    HTN (hypertension)    Diabetes mellitus    Gout    No significant past surgical history      ALLERGIES:  cefaclor (Unknown)  penicillin (Unknown)  sulfa drugs (Unknown)    MEDICATIONS:  STANDING MEDICATIONS  aspirin enteric coated 81 milliGRAM(s) Oral daily  carbidopa/levodopa  25/100 2 Tablet(s) Oral three times a day  carvedilol 12.5 milliGRAM(s) Oral every 12 hours  enoxaparin Injectable 40 milliGRAM(s) SubCutaneous every 24 hours  entacapone 200 milliGRAM(s) Oral four times a day  levETIRAcetam 500 milliGRAM(s) Oral two times a day  lisinopril 40 milliGRAM(s) Oral daily  melatonin 10 milliGRAM(s) Oral at bedtime  traZODone 100 milliGRAM(s) Oral daily    PRN MEDICATIONS      VITAL SIGNS: Last 24 Hours  T(C): 35.4 (25 Sep 2022 05:48), Max: 37.2 (24 Sep 2022 20:45)  T(F): 95.8 (25 Sep 2022 05:48), Max: 98.9 (24 Sep 2022 20:45)  HR: 62 (25 Sep 2022 05:48) (62 - 68)  BP: 153/91 (25 Sep 2022 05:48) (150/83 - 153/91)  BP(mean): --  RR: 18 (25 Sep 2022 05:48) (18 - 19)  SpO2: 99% (24 Sep 2022 13:49) (99% - 99%)    LABS:                        13.9   7.20  )-----------( 155      ( 25 Sep 2022 04:30 )             38.6     09-25    149<H>  |  112<H>  |  10  ----------------------------<  94  4.4   |  27  |  0.9    Ca    8.3<L>      25 Sep 2022 04:30  Phos  3.1     09-25  Mg     1.7     09-25    TPro  5.7<L>  /  Alb  3.1<L>  /  TBili  0.5  /  DBili  x   /  AST  10  /  ALT  <5  /  AlkPhos  62  09-25              Culture - Urine (collected 22 Sep 2022 15:00)  Source: Clean Catch Clean Catch (Midstream)  Preliminary Report (24 Sep 2022 14:16):    >100,000 CFU/ml Enterococcus species          RADIOLOGY:      PHYSICAL EXAM:  CONSTITUTIONAL: No acute distress, well-developed, well-groomed, AAOx3  HEAD: Atraumatic, normocephalic  EYES: EOM intact, PERRLA, conjunctiva and sclera clear  ENT: Supple, no masses, no thyromegaly, no bruits, no JVD; moist mucous membranes  PULMONARY: Clear to auscultation bilaterally; no wheezes, rales, or rhonchi  CARDIOVASCULAR: Regular rate and rhythm; no murmurs, rubs, or gallops  GASTROINTESTINAL: Soft, non-tender, non-distended; bowel sounds present  MUSCULOSKELETAL: 2+ peripheral pulses; no clubbing, no cyanosis, no edema  NEUROLOGY: non-focal  SKIN: No rashes or lesions; warm and dry   ZAKIA MILLS 72y Male  MRN#: [id number]   Hospital Day: 3d    SUBJECTIVE  Patient is a 72y old Male who presents with a chief complaint of fall and syncope (24 Sep 2022 17:14)  Currently admitted to medicine with the primary diagnosis of Syncope      INTERVAL HPI AND OVERNIGHT EVENTS:  Patient was examined and seen at bedside. This morning he is resting comfortably in bed and reports no issues or overnight events.    OBJECTIVE  PAST MEDICAL & SURGICAL HISTORY  Parkinson disease    CVA (cerebral vascular accident)    HTN (hypertension)    Diabetes mellitus    Gout    No significant past surgical history      ALLERGIES:  cefaclor (Unknown)  penicillin (Unknown)  sulfa drugs (Unknown)    MEDICATIONS:  STANDING MEDICATIONS  aspirin enteric coated 81 milliGRAM(s) Oral daily  carbidopa/levodopa  25/100 2 Tablet(s) Oral three times a day  carvedilol 12.5 milliGRAM(s) Oral every 12 hours  enoxaparin Injectable 40 milliGRAM(s) SubCutaneous every 24 hours  entacapone 200 milliGRAM(s) Oral four times a day  levETIRAcetam 500 milliGRAM(s) Oral two times a day  lisinopril 40 milliGRAM(s) Oral daily  melatonin 10 milliGRAM(s) Oral at bedtime  traZODone 100 milliGRAM(s) Oral daily    PRN MEDICATIONS      VITAL SIGNS: Last 24 Hours  T(C): 35.4 (25 Sep 2022 05:48), Max: 37.2 (24 Sep 2022 20:45)  T(F): 95.8 (25 Sep 2022 05:48), Max: 98.9 (24 Sep 2022 20:45)  HR: 62 (25 Sep 2022 05:48) (62 - 68)  BP: 153/91 (25 Sep 2022 05:48) (150/83 - 153/91)  BP(mean): --  RR: 18 (25 Sep 2022 05:48) (18 - 19)  SpO2: 99% (24 Sep 2022 13:49) (99% - 99%)    LABS:                        13.9   7.20  )-----------( 155      ( 25 Sep 2022 04:30 )             38.6     09-25    149<H>  |  112<H>  |  10  ----------------------------<  94  4.4   |  27  |  0.9    Ca    8.3<L>      25 Sep 2022 04:30  Phos  3.1     09-25  Mg     1.7     09-25    TPro  5.7<L>  /  Alb  3.1<L>  /  TBili  0.5  /  DBili  x   /  AST  10  /  ALT  <5  /  AlkPhos  62  09-25              Culture - Urine (collected 22 Sep 2022 15:00)  Source: Clean Catch Clean Catch (Midstream)  Preliminary Report (24 Sep 2022 14:16):    >100,000 CFU/ml Enterococcus species          RADIOLOGY:      PHYSICAL EXAM:  CONSTITUTIONAL: No acute distress, well-developed, well-groomed, AAOx3  PULMONARY: Clear to auscultation bilaterally; no added sounds  CARDIOVASCULAR: Regular rate and rhythm; no audible murmurs  GASTROINTESTINAL: Soft, non-tender, non-distended; bowel sounds present  MUSCULOSKELETAL: 2+ peripheral pulses; no clubbing, no cyanosis, no edema  NEUROLOGY: non-focal  SKIN: No rashes or lesions; warm and dry

## 2022-09-26 LAB
ALBUMIN SERPL ELPH-MCNC: 3.2 G/DL — LOW (ref 3.5–5.2)
ALP SERPL-CCNC: 66 U/L — SIGNIFICANT CHANGE UP (ref 30–115)
ALT FLD-CCNC: <5 U/L — SIGNIFICANT CHANGE UP (ref 0–41)
ANION GAP SERPL CALC-SCNC: 11 MMOL/L — SIGNIFICANT CHANGE UP (ref 7–14)
AST SERPL-CCNC: 19 U/L — SIGNIFICANT CHANGE UP (ref 0–41)
BASOPHILS # BLD AUTO: 0.02 K/UL — SIGNIFICANT CHANGE UP (ref 0–0.2)
BASOPHILS NFR BLD AUTO: 0.3 % — SIGNIFICANT CHANGE UP (ref 0–1)
BILIRUB SERPL-MCNC: 0.6 MG/DL — SIGNIFICANT CHANGE UP (ref 0.2–1.2)
BUN SERPL-MCNC: 9 MG/DL — LOW (ref 10–20)
CALCIUM SERPL-MCNC: 8.5 MG/DL — SIGNIFICANT CHANGE UP (ref 8.4–10.5)
CHLORIDE SERPL-SCNC: 108 MMOL/L — SIGNIFICANT CHANGE UP (ref 98–110)
CO2 SERPL-SCNC: 25 MMOL/L — SIGNIFICANT CHANGE UP (ref 17–32)
CREAT SERPL-MCNC: 0.9 MG/DL — SIGNIFICANT CHANGE UP (ref 0.7–1.5)
EGFR: 92 ML/MIN/1.73M2 — SIGNIFICANT CHANGE UP
EOSINOPHIL # BLD AUTO: 0.11 K/UL — SIGNIFICANT CHANGE UP (ref 0–0.7)
EOSINOPHIL NFR BLD AUTO: 1.7 % — SIGNIFICANT CHANGE UP (ref 0–8)
FOLATE SERPL-MCNC: 15.7 NG/ML — SIGNIFICANT CHANGE UP
GLUCOSE BLDC GLUCOMTR-MCNC: 79 MG/DL — SIGNIFICANT CHANGE UP (ref 70–99)
GLUCOSE BLDC GLUCOMTR-MCNC: 83 MG/DL — SIGNIFICANT CHANGE UP (ref 70–99)
GLUCOSE SERPL-MCNC: 84 MG/DL — SIGNIFICANT CHANGE UP (ref 70–99)
HCT VFR BLD CALC: 38.3 % — LOW (ref 42–52)
HGB BLD-MCNC: 14.1 G/DL — SIGNIFICANT CHANGE UP (ref 14–18)
IMM GRANULOCYTES NFR BLD AUTO: 0.3 % — SIGNIFICANT CHANGE UP (ref 0.1–0.3)
LEVETIRACETAM SERPL-MCNC: 13.1 UG/ML — SIGNIFICANT CHANGE UP (ref 10–40)
LEVETIRACETAM SERPL-MCNC: 15.3 UG/ML — SIGNIFICANT CHANGE UP (ref 10–40)
LYMPHOCYTES # BLD AUTO: 2.02 K/UL — SIGNIFICANT CHANGE UP (ref 1.2–3.4)
LYMPHOCYTES # BLD AUTO: 30.6 % — SIGNIFICANT CHANGE UP (ref 20.5–51.1)
MAGNESIUM SERPL-MCNC: 1.9 MG/DL — SIGNIFICANT CHANGE UP (ref 1.8–2.4)
MCHC RBC-ENTMCNC: 34.4 PG — HIGH (ref 27–31)
MCHC RBC-ENTMCNC: 36.8 G/DL — SIGNIFICANT CHANGE UP (ref 32–37)
MCV RBC AUTO: 93.4 FL — SIGNIFICANT CHANGE UP (ref 80–94)
MONOCYTES # BLD AUTO: 0.52 K/UL — SIGNIFICANT CHANGE UP (ref 0.1–0.6)
MONOCYTES NFR BLD AUTO: 7.9 % — SIGNIFICANT CHANGE UP (ref 1.7–9.3)
NEUTROPHILS # BLD AUTO: 3.91 K/UL — SIGNIFICANT CHANGE UP (ref 1.4–6.5)
NEUTROPHILS NFR BLD AUTO: 59.2 % — SIGNIFICANT CHANGE UP (ref 42.2–75.2)
NRBC # BLD: 0 /100 WBCS — SIGNIFICANT CHANGE UP (ref 0–0)
PHOSPHATE SERPL-MCNC: 3.1 MG/DL — SIGNIFICANT CHANGE UP (ref 2.1–4.9)
PLATELET # BLD AUTO: 147 K/UL — SIGNIFICANT CHANGE UP (ref 130–400)
POTASSIUM SERPL-MCNC: 4.1 MMOL/L — SIGNIFICANT CHANGE UP (ref 3.5–5)
POTASSIUM SERPL-SCNC: 4.1 MMOL/L — SIGNIFICANT CHANGE UP (ref 3.5–5)
PROT SERPL-MCNC: 5.8 G/DL — LOW (ref 6–8)
RBC # BLD: 4.1 M/UL — LOW (ref 4.7–6.1)
RBC # FLD: 11.3 % — LOW (ref 11.5–14.5)
SODIUM SERPL-SCNC: 144 MMOL/L — SIGNIFICANT CHANGE UP (ref 135–146)
TSH SERPL-MCNC: 2.47 UIU/ML — SIGNIFICANT CHANGE UP (ref 0.27–4.2)
VIT B12 SERPL-MCNC: 1134 PG/ML — SIGNIFICANT CHANGE UP (ref 232–1245)
WBC # BLD: 6.6 K/UL — SIGNIFICANT CHANGE UP (ref 4.8–10.8)
WBC # FLD AUTO: 6.6 K/UL — SIGNIFICANT CHANGE UP (ref 4.8–10.8)

## 2022-09-26 PROCEDURE — 93306 TTE W/DOPPLER COMPLETE: CPT | Mod: 26

## 2022-09-26 PROCEDURE — 70551 MRI BRAIN STEM W/O DYE: CPT | Mod: 26

## 2022-09-26 PROCEDURE — 99232 SBSQ HOSP IP/OBS MODERATE 35: CPT

## 2022-09-26 RX ADMIN — CARBIDOPA AND LEVODOPA 2 TABLET(S): 25; 100 TABLET ORAL at 05:58

## 2022-09-26 RX ADMIN — LISINOPRIL 40 MILLIGRAM(S): 2.5 TABLET ORAL at 05:58

## 2022-09-26 RX ADMIN — ENTACAPONE 200 MILLIGRAM(S): 200 TABLET, FILM COATED ORAL at 23:49

## 2022-09-26 RX ADMIN — Medication 10 MILLIGRAM(S): at 21:55

## 2022-09-26 RX ADMIN — ENTACAPONE 200 MILLIGRAM(S): 200 TABLET, FILM COATED ORAL at 11:29

## 2022-09-26 RX ADMIN — ENTACAPONE 200 MILLIGRAM(S): 200 TABLET, FILM COATED ORAL at 17:08

## 2022-09-26 RX ADMIN — Medication 81 MILLIGRAM(S): at 11:29

## 2022-09-26 RX ADMIN — CARBIDOPA AND LEVODOPA 2 TABLET(S): 25; 100 TABLET ORAL at 15:49

## 2022-09-26 RX ADMIN — Medication 100 MILLIGRAM(S): at 21:54

## 2022-09-26 RX ADMIN — ENTACAPONE 200 MILLIGRAM(S): 200 TABLET, FILM COATED ORAL at 05:57

## 2022-09-26 RX ADMIN — CARVEDILOL PHOSPHATE 12.5 MILLIGRAM(S): 80 CAPSULE, EXTENDED RELEASE ORAL at 05:58

## 2022-09-26 RX ADMIN — ENOXAPARIN SODIUM 40 MILLIGRAM(S): 100 INJECTION SUBCUTANEOUS at 21:50

## 2022-09-26 RX ADMIN — LEVETIRACETAM 500 MILLIGRAM(S): 250 TABLET, FILM COATED ORAL at 05:58

## 2022-09-26 RX ADMIN — CARVEDILOL PHOSPHATE 12.5 MILLIGRAM(S): 80 CAPSULE, EXTENDED RELEASE ORAL at 17:05

## 2022-09-26 RX ADMIN — CARBIDOPA AND LEVODOPA 2 TABLET(S): 25; 100 TABLET ORAL at 21:54

## 2022-09-26 RX ADMIN — LEVETIRACETAM 500 MILLIGRAM(S): 250 TABLET, FILM COATED ORAL at 17:05

## 2022-09-26 NOTE — PROGRESS NOTE ADULT - SUBJECTIVE AND OBJECTIVE BOX
ZAKIA MILLS 72y Male  MRN#: [id number]   Hospital Day: 4d    SUBJECTIVE  Patient is a 72y old Male who presents with a chief complaint of fall and syncope (25 Sep 2022 20:58)  Currently admitted to medicine with the primary diagnosis of Syncope      INTERVAL HPI AND OVERNIGHT EVENTS:  Patient was examined and seen at bedside. This morning he is resting comfortably in bed and reports no issues or overnight events.    OBJECTIVE  PAST MEDICAL & SURGICAL HISTORY  Parkinson disease    CVA (cerebral vascular accident)    HTN (hypertension)    Diabetes mellitus    Gout    No significant past surgical history      ALLERGIES:  cefaclor (Unknown)  penicillin (Unknown)  sulfa drugs (Unknown)    MEDICATIONS:  STANDING MEDICATIONS  aspirin enteric coated 81 milliGRAM(s) Oral daily  carbidopa/levodopa  25/100 2 Tablet(s) Oral three times a day  carvedilol 12.5 milliGRAM(s) Oral every 12 hours  enoxaparin Injectable 40 milliGRAM(s) SubCutaneous every 24 hours  entacapone 200 milliGRAM(s) Oral four times a day  levETIRAcetam 500 milliGRAM(s) Oral two times a day  lisinopril 40 milliGRAM(s) Oral daily  melatonin 10 milliGRAM(s) Oral at bedtime  traZODone 100 milliGRAM(s) Oral daily    PRN MEDICATIONS      VITAL SIGNS: Last 24 Hours  T(C): 36.1 (26 Sep 2022 05:28), Max: 36.4 (25 Sep 2022 21:11)  T(F): 97 (26 Sep 2022 05:28), Max: 97.6 (25 Sep 2022 21:11)  HR: 70 (25 Sep 2022 21:11) (70 - 71)  BP: 161/86 (26 Sep 2022 05:28) (159/95 - 161/86)  BP(mean): --  RR: 18 (26 Sep 2022 05:28) (18 - 18)  SpO2: --    LABS:                        14.1   6.60  )-----------( 147      ( 26 Sep 2022 07:03 )             38.3     09-26    144  |  108  |  9<L>  ----------------------------<  84  4.1   |  25  |  0.9    Ca    8.5      26 Sep 2022 07:03  Phos  3.1     09-26  Mg     1.9     09-26    TPro  5.8<L>  /  Alb  3.2<L>  /  TBili  0.6  /  DBili  x   /  AST  19  /  ALT  <5  /  AlkPhos  66  09-26                  RADIOLOGY:      PHYSICAL EXAM:  CONSTITUTIONAL: No acute distress, well-developed, well-groomed, AAOx3  PULMONARY: Clear to auscultation bilaterally; no added sounds  CARDIOVASCULAR: Regular rate and rhythm; no audible murmurs  GASTROINTESTINAL: Soft, non-tender, non-distended; bowel sounds present  MUSCULOSKELETAL: 2+ peripheral pulses; no clubbing, no cyanosis, no edema  NEUROLOGY: non-focal  SKIN: No rashes or lesions; warm and dry

## 2022-09-26 NOTE — PROGRESS NOTE ADULT - ASSESSMENT
72 M with PMH of Parkinson's disease, gout, HTN, CVA ('98 and '16), DM, hx of seizure, presenting for altered mental status and syncope.   Patient admitted for fall, from suspect syncope.     # Fall 2/2 suspected syncope   # h/o Seizures   - CT Head: No acute pathology; mod-severe microvascular changes, chronic lacunar infarcts   - CT C Spine: No acute fracture   - X Ray Pelvis: no acute fracture; osteoarthritis   - EEG negative  - c/w telemetry  - f/u Keppra level (ordered)   - check orthostatics  - F/u TTE   - Neurology recs: MRI head, b12, folate, tsh => F/u    # Asymptomatic Bacteruria   - UA (+)   - not currently complaining of symptoms   - monitor off abx for now     # HTN, stable  - c/w lisinopril     # DM II, controlled, A1c 5.3   - hold home metformin   - check fingersticks start insulin sliding scale if fsg>180    # Hx of CVA (2 strokes in past)   - per patient, no residual deficits     # Parkinson's disease  - c/w entacapone   - c/w cabidopa-levidopa   - RIVASTIGMINE held, not available in our pharmacy, asked patient to bring his home pills    # Insomnia   - c/w trazodone   - c/w melatonin     # DVT prophylaxis - on lovenox subcut  # Code status - Full Code

## 2022-09-26 NOTE — PROGRESS NOTE ADULT - ATTENDING COMMENTS
72 M with PMH of Parkinson's disease, gout, HTN, CVA ('98 and '16), DM, hx of seizure, presenting for altered mental status and syncope.   Patient admitted for fall, from suspect syncope.     # Fall 2/2 suspected syncope   # h/o Seizures   - CT Head: No acute pathology; mod-severe microvascular changes, chronic lacunar infarcts   - CT C Spine: No acute fracture   - X Ray Pelvis: no acute fracture; osteoarthritis   - EEG negative  - c/w telemetry - no events so far   - check orthostatics  - F/u TTE   - Neurology recs: MRI head pending     # Asymptomatic Bacteruria   - UA (+)   - not currently complaining of symptoms   - monitor off abx for now     # HTN, stable  - c/w lisinopril     # DM II, controlled, A1c 5.3   - hold home metformin   - check fingersticks start insulin sliding scale if fsg>180    # Hx of CVA (2 strokes in past)   - per patient, no residual deficits     # Parkinson's disease  - c/w entacapone   - c/w Carbidopa-Levodopa   - RIVASTIGMINE held, not available in our pharmacy, asked patient to bring his home pills    # Insomnia   - c/w trazodone   - c/w melatonin     # DVT prophylaxis - on Lovenox         Pending: MRI brain, TTE  Plan of care d/w patient   Dispo: [facility]

## 2022-09-27 ENCOUNTER — TRANSCRIPTION ENCOUNTER (OUTPATIENT)
Age: 72
End: 2022-09-27

## 2022-09-27 VITALS — HEART RATE: 107 BPM | DIASTOLIC BLOOD PRESSURE: 105 MMHG | SYSTOLIC BLOOD PRESSURE: 169 MMHG

## 2022-09-27 LAB
ALBUMIN SERPL ELPH-MCNC: 3.1 G/DL — LOW (ref 3.5–5.2)
ALP SERPL-CCNC: 65 U/L — SIGNIFICANT CHANGE UP (ref 30–115)
ALT FLD-CCNC: <5 U/L — SIGNIFICANT CHANGE UP (ref 0–41)
ANION GAP SERPL CALC-SCNC: 10 MMOL/L — SIGNIFICANT CHANGE UP (ref 7–14)
AST SERPL-CCNC: 18 U/L — SIGNIFICANT CHANGE UP (ref 0–41)
BASOPHILS # BLD AUTO: 0.02 K/UL — SIGNIFICANT CHANGE UP (ref 0–0.2)
BASOPHILS NFR BLD AUTO: 0.2 % — SIGNIFICANT CHANGE UP (ref 0–1)
BILIRUB SERPL-MCNC: 0.4 MG/DL — SIGNIFICANT CHANGE UP (ref 0.2–1.2)
BUN SERPL-MCNC: 11 MG/DL — SIGNIFICANT CHANGE UP (ref 10–20)
CALCIUM SERPL-MCNC: 8.3 MG/DL — LOW (ref 8.4–10.5)
CHLORIDE SERPL-SCNC: 105 MMOL/L — SIGNIFICANT CHANGE UP (ref 98–110)
CO2 SERPL-SCNC: 24 MMOL/L — SIGNIFICANT CHANGE UP (ref 17–32)
CREAT SERPL-MCNC: 1 MG/DL — SIGNIFICANT CHANGE UP (ref 0.7–1.5)
EGFR: 81 ML/MIN/1.73M2 — SIGNIFICANT CHANGE UP
EOSINOPHIL # BLD AUTO: 0.11 K/UL — SIGNIFICANT CHANGE UP (ref 0–0.7)
EOSINOPHIL NFR BLD AUTO: 1.3 % — SIGNIFICANT CHANGE UP (ref 0–8)
GLUCOSE SERPL-MCNC: 84 MG/DL — SIGNIFICANT CHANGE UP (ref 70–99)
HCT VFR BLD CALC: 38.9 % — LOW (ref 42–52)
HGB BLD-MCNC: 13.8 G/DL — LOW (ref 14–18)
IMM GRANULOCYTES NFR BLD AUTO: 0.6 % — HIGH (ref 0.1–0.3)
LYMPHOCYTES # BLD AUTO: 2.49 K/UL — SIGNIFICANT CHANGE UP (ref 1.2–3.4)
LYMPHOCYTES # BLD AUTO: 28.8 % — SIGNIFICANT CHANGE UP (ref 20.5–51.1)
MAGNESIUM SERPL-MCNC: 1.8 MG/DL — SIGNIFICANT CHANGE UP (ref 1.8–2.4)
MCHC RBC-ENTMCNC: 33.2 PG — HIGH (ref 27–31)
MCHC RBC-ENTMCNC: 35.5 G/DL — SIGNIFICANT CHANGE UP (ref 32–37)
MCV RBC AUTO: 93.5 FL — SIGNIFICANT CHANGE UP (ref 80–94)
MONOCYTES # BLD AUTO: 0.6 K/UL — SIGNIFICANT CHANGE UP (ref 0.1–0.6)
MONOCYTES NFR BLD AUTO: 6.9 % — SIGNIFICANT CHANGE UP (ref 1.7–9.3)
NEUTROPHILS # BLD AUTO: 5.38 K/UL — SIGNIFICANT CHANGE UP (ref 1.4–6.5)
NEUTROPHILS NFR BLD AUTO: 62.2 % — SIGNIFICANT CHANGE UP (ref 42.2–75.2)
NRBC # BLD: 0 /100 WBCS — SIGNIFICANT CHANGE UP (ref 0–0)
PHOSPHATE SERPL-MCNC: 3.2 MG/DL — SIGNIFICANT CHANGE UP (ref 2.1–4.9)
PLATELET # BLD AUTO: 161 K/UL — SIGNIFICANT CHANGE UP (ref 130–400)
POTASSIUM SERPL-MCNC: 3.9 MMOL/L — SIGNIFICANT CHANGE UP (ref 3.5–5)
POTASSIUM SERPL-SCNC: 3.9 MMOL/L — SIGNIFICANT CHANGE UP (ref 3.5–5)
PROT SERPL-MCNC: 5.5 G/DL — LOW (ref 6–8)
RBC # BLD: 4.16 M/UL — LOW (ref 4.7–6.1)
RBC # FLD: 11.1 % — LOW (ref 11.5–14.5)
SARS-COV-2 RNA SPEC QL NAA+PROBE: SIGNIFICANT CHANGE UP
SODIUM SERPL-SCNC: 139 MMOL/L — SIGNIFICANT CHANGE UP (ref 135–146)
WBC # BLD: 8.65 K/UL — SIGNIFICANT CHANGE UP (ref 4.8–10.8)
WBC # FLD AUTO: 8.65 K/UL — SIGNIFICANT CHANGE UP (ref 4.8–10.8)

## 2022-09-27 PROCEDURE — 99238 HOSP IP/OBS DSCHRG MGMT 30/<: CPT

## 2022-09-27 RX ADMIN — CARVEDILOL PHOSPHATE 12.5 MILLIGRAM(S): 80 CAPSULE, EXTENDED RELEASE ORAL at 17:03

## 2022-09-27 RX ADMIN — CARBIDOPA AND LEVODOPA 2 TABLET(S): 25; 100 TABLET ORAL at 13:38

## 2022-09-27 RX ADMIN — CARVEDILOL PHOSPHATE 12.5 MILLIGRAM(S): 80 CAPSULE, EXTENDED RELEASE ORAL at 09:27

## 2022-09-27 RX ADMIN — ENTACAPONE 200 MILLIGRAM(S): 200 TABLET, FILM COATED ORAL at 05:31

## 2022-09-27 RX ADMIN — Medication 81 MILLIGRAM(S): at 11:41

## 2022-09-27 RX ADMIN — ENTACAPONE 200 MILLIGRAM(S): 200 TABLET, FILM COATED ORAL at 11:42

## 2022-09-27 RX ADMIN — LISINOPRIL 40 MILLIGRAM(S): 2.5 TABLET ORAL at 05:29

## 2022-09-27 RX ADMIN — LEVETIRACETAM 500 MILLIGRAM(S): 250 TABLET, FILM COATED ORAL at 05:29

## 2022-09-27 RX ADMIN — LEVETIRACETAM 500 MILLIGRAM(S): 250 TABLET, FILM COATED ORAL at 17:04

## 2022-09-27 RX ADMIN — CARBIDOPA AND LEVODOPA 2 TABLET(S): 25; 100 TABLET ORAL at 05:30

## 2022-09-27 RX ADMIN — ENTACAPONE 200 MILLIGRAM(S): 200 TABLET, FILM COATED ORAL at 17:07

## 2022-09-27 NOTE — DISCHARGE NOTE PROVIDER - NSDCMRMEDTOKEN_GEN_ALL_CORE_FT
aspirin 81 mg oral delayed release tablet: 1 tab(s) orally once a day  carbidopa-levodopa 25 mg-100 mg oral tablet: 2 tab(s) orally 3 times a day  carvedilol 12.5 mg oral tablet: 1 tab(s) orally every 12 hours  entacapone 200 mg oral tablet: 1 tab(s) orally 4 times a day with sinemet  levETIRAcetam 500 mg oral tablet: 1 tab(s) orally 2 times a day  lisinopril 40 mg oral tablet: 1 tab(s) orally once a day  Melatonin 10 mg oral capsule: 1 cap(s) orally once a day (at bedtime)  metFORMIN 500 mg oral tablet, extended release: 1 tab(s) orally once a day (at bedtime)  rivastigmine 9.5 mg/24 hr transdermal film, extended release: 1 patch transdermal every 24 hours  traZODone 100 mg oral tablet: 1 tab(s) orally once a day

## 2022-09-27 NOTE — DISCHARGE NOTE PROVIDER - NSDCCPCAREPLAN_GEN_ALL_CORE_FT
PRINCIPAL DISCHARGE DIAGNOSIS  Diagnosis: Syncope  Assessment and Plan of Treatment: Syncope is when you temporarily lose consciousness, also called fainting or passing out. It is caused by a sudden decrease in blood flow to the brain. Even though most causes of syncope are not dangerous, syncope can possibly be a sign of a serious medical problem. Signs that you may be about to faint include feeling dizzy, lightheaded, nausea, visual changes, or cold/clammy skin. Do not drive, operate heavy machinery, or play sports until your health care provider says it is okay.  SEEK IMMEDIATE MEDICAL CARE IF YOU HAVE ANY OF THE FOLLOWING SYMPTOMS: severe headache, pain in your chest/abdomen/back, bleeding from your mouth or rectum, palpitations, shortness of breath, pain with breathing, seizure, confusion, or trouble walking.  Workup here did not reveal anything in your heart or brain that might cause the syncope.  The most likely cause is neuro-cardiogenic especially in the setting of your Parkinson's.  Follow up with your PCP and with neurology.

## 2022-09-27 NOTE — DISCHARGE NOTE NURSING/CASE MANAGEMENT/SOCIAL WORK - PATIENT PORTAL LINK FT
You can access the FollowMyHealth Patient Portal offered by [facility] by registering at the following website: [email or web address]. By joining [employer]’s FollowMyHealth portal, you will also be able to view your health information using other applications (apps) compatible with our system.

## 2022-09-27 NOTE — DISCHARGE NOTE PROVIDER - PROVIDER TOKENS
PROVIDER:[TOKEN:[[id number]],FOLLOWUP:[2 weeks]],PROVIDER:[TOKEN:[[id number]],FOLLOWUP:[2 weeks]]

## 2022-09-27 NOTE — DISCHARGE NOTE NURSING/CASE MANAGEMENT/SOCIAL WORK - NSDCPEFALRISK_GEN_ALL_CORE
For information on Fall & Injury Prevention, visit: [email or web address] OR  [email or web address] OR  https://www.cdc.gov/steadi/patient.html

## 2022-09-27 NOTE — DISCHARGE NOTE NURSING/CASE MANAGEMENT/SOCIAL WORK - NSDCVIVACCINE_GEN_ALL_CORE_FT
Tdap; 12-May-2020 22:10; Angelina Gomez (RN); [employer]; [id number] (Exp. Date: 19-Apr-2021); IntraMuscular; Deltoid Right.; 0.5 milliLiter(s); VIS (VIS Published: 09-May-2013, VIS Presented: 12-May-2020);

## 2022-09-27 NOTE — DISCHARGE NOTE PROVIDER - HOSPITAL COURSE
HPI:  72 M with PMH of Parkinson's disease, gout, HTN, CVA ('98 and '16), DM, presenting for altered mental status and syncope.   Per patient, he was plugging something in at his assisted living facility, then fall over on to ground. He doesn't remember if he lost consciousness, or how long he was on the floor, but reports someone found him. He denies any prodromal nausea or vomiting before the incident, denies incontinence or loss of control over bowels before incident.  Per chart, patient had 2x falls, and inability to recollect incident.     ED Course:   T 99, HR 95, /85, RR 20, 95% on RA   CT Head: No acute pathology; mod-severe microvascular changes, chronic lacunar infarcts   CT C Spine: No acute fracture   X Ray Pelvis: no acute fracture; osteoarthritis     Patient admitted for fall, from suspect syncope.     # Fall 2/2 suspected syncope   # h/o Seizures   - CT Head: No acute pathology; mod-severe microvascular changes, chronic lacunar infarcts   - CT C Spine: No acute fracture   - X Ray Pelvis: no acute fracture; osteoarthritis   - EEG negative  - MRI brain: No MRI evidence to suggest acute ischemic change. Interval development of opacification of the right mastoid tip likely secondary to inflammation or infection. Slight progression of the T2/FLAIR hyperintensities and mild increase in the number of small chronic hemorrhages.Interval development of corpus callosal atrophy.  - orthostatics negative  - Echo: EF 65-70%, grade 1 DD  - Folate, TSH & B12 normal

## 2022-09-27 NOTE — DISCHARGE NOTE PROVIDER - CARE PROVIDER_API CALL
Ha Tejada)  Internal Medicine  [address]  [place] [address]  Phone: [phone]  Fax: [phone]  Follow Up Time: 2 weeks    Harpreet Cevallos)  Neurology  [facility], [address]  [place] [address]  Phone: [phone]  Fax: [phone]  Follow Up Time: 2 weeks

## 2022-09-27 NOTE — CHART NOTE - NSCHARTNOTEFT_GEN_A_CORE
MRI Brain reviewed. No MRI evidence to suggest acute ischemic changes with slight progression of T2/FLAIR hyperintensities and a mild increase in the number of small chronic hemorrhages. There is also an interval development of opacification of the right mastoid tip likely secondary to inflammation or infection, which should be investigated further if clinically indicated. Discussed with attending.

## 2022-10-03 DIAGNOSIS — Z86.73 PERSONAL HISTORY OF TRANSIENT ISCHEMIC ATTACK (TIA), AND CEREBRAL INFARCTION WITHOUT RESIDUAL DEFICITS: ICD-10-CM

## 2022-10-03 DIAGNOSIS — M10.9 GOUT, UNSPECIFIED: ICD-10-CM

## 2022-10-03 DIAGNOSIS — R41.82 ALTERED MENTAL STATUS, UNSPECIFIED: ICD-10-CM

## 2022-10-03 DIAGNOSIS — Z79.82 LONG TERM (CURRENT) USE OF ASPIRIN: ICD-10-CM

## 2022-10-03 DIAGNOSIS — G20 PARKINSON'S DISEASE: ICD-10-CM

## 2022-10-03 DIAGNOSIS — G47.00 INSOMNIA, UNSPECIFIED: ICD-10-CM

## 2022-10-03 DIAGNOSIS — R82.71 BACTERIURIA: ICD-10-CM

## 2022-10-03 DIAGNOSIS — Z88.2 ALLERGY STATUS TO SULFONAMIDES: ICD-10-CM

## 2022-10-03 DIAGNOSIS — Z88.0 ALLERGY STATUS TO PENICILLIN: ICD-10-CM

## 2022-10-03 DIAGNOSIS — G40.909 EPILEPSY, UNSPECIFIED, NOT INTRACTABLE, WITHOUT STATUS EPILEPTICUS: ICD-10-CM

## 2022-10-03 DIAGNOSIS — R55 SYNCOPE AND COLLAPSE: ICD-10-CM

## 2022-10-03 DIAGNOSIS — E11.9 TYPE 2 DIABETES MELLITUS WITHOUT COMPLICATIONS: ICD-10-CM

## 2022-10-03 DIAGNOSIS — H70.91 UNSPECIFIED MASTOIDITIS, RIGHT EAR: ICD-10-CM

## 2022-10-03 DIAGNOSIS — I10 ESSENTIAL (PRIMARY) HYPERTENSION: ICD-10-CM

## 2022-10-03 DIAGNOSIS — Z79.84 LONG TERM (CURRENT) USE OF ORAL HYPOGLYCEMIC DRUGS: ICD-10-CM

## 2022-10-12 DIAGNOSIS — R56.9 UNSPECIFIED CONVULSIONS: ICD-10-CM

## 2023-01-20 ENCOUNTER — INPATIENT (INPATIENT)
Facility: HOSPITAL | Age: 73
LOS: 1 days | Discharge: HOME | End: 2023-01-22
Attending: STUDENT IN AN ORGANIZED HEALTH CARE EDUCATION/TRAINING PROGRAM | Admitting: STUDENT IN AN ORGANIZED HEALTH CARE EDUCATION/TRAINING PROGRAM
Payer: MEDICARE

## 2023-01-20 VITALS
HEART RATE: 74 BPM | OXYGEN SATURATION: 96 % | TEMPERATURE: 98 F | SYSTOLIC BLOOD PRESSURE: 141 MMHG | DIASTOLIC BLOOD PRESSURE: 94 MMHG | RESPIRATION RATE: 20 BRPM

## 2023-01-20 LAB
ALBUMIN SERPL ELPH-MCNC: 3.8 G/DL — SIGNIFICANT CHANGE UP (ref 3.5–5.2)
ALP SERPL-CCNC: 71 U/L — SIGNIFICANT CHANGE UP (ref 30–115)
ALT FLD-CCNC: <5 U/L — SIGNIFICANT CHANGE UP (ref 0–41)
ANION GAP SERPL CALC-SCNC: 12 MMOL/L — SIGNIFICANT CHANGE UP (ref 7–14)
APPEARANCE UR: CLEAR — SIGNIFICANT CHANGE UP
APTT BLD: 28.9 SEC — SIGNIFICANT CHANGE UP (ref 27–39.2)
AST SERPL-CCNC: 13 U/L — SIGNIFICANT CHANGE UP (ref 0–41)
BACTERIA # UR AUTO: NEGATIVE — SIGNIFICANT CHANGE UP
BASE EXCESS BLDV CALC-SCNC: -0.2 MMOL/L — SIGNIFICANT CHANGE UP (ref -2–3)
BASOPHILS # BLD AUTO: 0.02 K/UL — SIGNIFICANT CHANGE UP (ref 0–0.2)
BASOPHILS NFR BLD AUTO: 0.3 % — SIGNIFICANT CHANGE UP (ref 0–1)
BILIRUB SERPL-MCNC: 0.8 MG/DL — SIGNIFICANT CHANGE UP (ref 0.2–1.2)
BILIRUB UR-MCNC: NEGATIVE — SIGNIFICANT CHANGE UP
BUN SERPL-MCNC: 13 MG/DL — SIGNIFICANT CHANGE UP (ref 10–20)
CA-I SERPL-SCNC: 1.14 MMOL/L — LOW (ref 1.15–1.33)
CALCIUM SERPL-MCNC: 8.7 MG/DL — SIGNIFICANT CHANGE UP (ref 8.4–10.5)
CHLORIDE SERPL-SCNC: 103 MMOL/L — SIGNIFICANT CHANGE UP (ref 98–110)
CO2 SERPL-SCNC: 25 MMOL/L — SIGNIFICANT CHANGE UP (ref 17–32)
COLOR SPEC: YELLOW — SIGNIFICANT CHANGE UP
CREAT SERPL-MCNC: 1 MG/DL — SIGNIFICANT CHANGE UP (ref 0.7–1.5)
DIFF PNL FLD: NEGATIVE — SIGNIFICANT CHANGE UP
EGFR: 81 ML/MIN/1.73M2 — SIGNIFICANT CHANGE UP
EOSINOPHIL # BLD AUTO: 0.12 K/UL — SIGNIFICANT CHANGE UP (ref 0–0.7)
EOSINOPHIL NFR BLD AUTO: 1.5 % — SIGNIFICANT CHANGE UP (ref 0–8)
EPI CELLS # UR: 0 /HPF — SIGNIFICANT CHANGE UP (ref 0–5)
GAS PNL BLDV: 134 MMOL/L — LOW (ref 136–145)
GAS PNL BLDV: SIGNIFICANT CHANGE UP
GLUCOSE SERPL-MCNC: 103 MG/DL — HIGH (ref 70–99)
GLUCOSE UR QL: NEGATIVE — SIGNIFICANT CHANGE UP
HCO3 BLDV-SCNC: 25 MMOL/L — SIGNIFICANT CHANGE UP (ref 22–29)
HCT VFR BLD CALC: 43.5 % — SIGNIFICANT CHANGE UP (ref 42–52)
HCT VFR BLDA CALC: 43 % — SIGNIFICANT CHANGE UP (ref 39–51)
HGB BLD CALC-MCNC: 14.3 G/DL — SIGNIFICANT CHANGE UP (ref 12.6–17.4)
HGB BLD-MCNC: 15.8 G/DL — SIGNIFICANT CHANGE UP (ref 14–18)
HYALINE CASTS # UR AUTO: 1 /LPF — SIGNIFICANT CHANGE UP (ref 0–7)
IMM GRANULOCYTES NFR BLD AUTO: 0.3 % — SIGNIFICANT CHANGE UP (ref 0.1–0.3)
INR BLD: 1.06 RATIO — SIGNIFICANT CHANGE UP (ref 0.65–1.3)
KETONES UR-MCNC: NEGATIVE — SIGNIFICANT CHANGE UP
LACTATE BLDV-MCNC: 1.4 MMOL/L — SIGNIFICANT CHANGE UP (ref 0.5–2)
LACTATE SERPL-SCNC: 3 MMOL/L — HIGH (ref 0.7–2)
LEUKOCYTE ESTERASE UR-ACNC: ABNORMAL
LYMPHOCYTES # BLD AUTO: 2.29 K/UL — SIGNIFICANT CHANGE UP (ref 1.2–3.4)
LYMPHOCYTES # BLD AUTO: 29.1 % — SIGNIFICANT CHANGE UP (ref 20.5–51.1)
MCHC RBC-ENTMCNC: 34 PG — HIGH (ref 27–31)
MCHC RBC-ENTMCNC: 36.3 G/DL — SIGNIFICANT CHANGE UP (ref 32–37)
MCV RBC AUTO: 93.5 FL — SIGNIFICANT CHANGE UP (ref 80–94)
MONOCYTES # BLD AUTO: 0.58 K/UL — SIGNIFICANT CHANGE UP (ref 0.1–0.6)
MONOCYTES NFR BLD AUTO: 7.4 % — SIGNIFICANT CHANGE UP (ref 1.7–9.3)
NEUTROPHILS # BLD AUTO: 4.85 K/UL — SIGNIFICANT CHANGE UP (ref 1.4–6.5)
NEUTROPHILS NFR BLD AUTO: 61.4 % — SIGNIFICANT CHANGE UP (ref 42.2–75.2)
NITRITE UR-MCNC: NEGATIVE — SIGNIFICANT CHANGE UP
NRBC # BLD: 0 /100 WBCS — SIGNIFICANT CHANGE UP (ref 0–0)
PCO2 BLDV: 44 MMHG — SIGNIFICANT CHANGE UP (ref 42–55)
PH BLDV: 7.37 — SIGNIFICANT CHANGE UP (ref 7.32–7.43)
PH UR: 6.5 — SIGNIFICANT CHANGE UP (ref 5–8)
PLATELET # BLD AUTO: 134 K/UL — SIGNIFICANT CHANGE UP (ref 130–400)
PO2 BLDV: 50 MMHG — SIGNIFICANT CHANGE UP
POTASSIUM BLDV-SCNC: 3.9 MMOL/L — SIGNIFICANT CHANGE UP (ref 3.5–5.1)
POTASSIUM SERPL-MCNC: 4.2 MMOL/L — SIGNIFICANT CHANGE UP (ref 3.5–5)
POTASSIUM SERPL-SCNC: 4.2 MMOL/L — SIGNIFICANT CHANGE UP (ref 3.5–5)
PROT SERPL-MCNC: 5.9 G/DL — LOW (ref 6–8)
PROT UR-MCNC: SIGNIFICANT CHANGE UP
PROTHROM AB SERPL-ACNC: 12.1 SEC — SIGNIFICANT CHANGE UP (ref 9.95–12.87)
RBC # BLD: 4.65 M/UL — LOW (ref 4.7–6.1)
RBC # FLD: 11.7 % — SIGNIFICANT CHANGE UP (ref 11.5–14.5)
RBC CASTS # UR COMP ASSIST: 1 /HPF — SIGNIFICANT CHANGE UP (ref 0–4)
SAO2 % BLDV: 83.5 % — SIGNIFICANT CHANGE UP
SARS-COV-2 RNA SPEC QL NAA+PROBE: SIGNIFICANT CHANGE UP
SODIUM SERPL-SCNC: 140 MMOL/L — SIGNIFICANT CHANGE UP (ref 135–146)
SP GR SPEC: >1.05 (ref 1.01–1.03)
TROPONIN T SERPL-MCNC: <0.01 NG/ML — SIGNIFICANT CHANGE UP
UROBILINOGEN FLD QL: SIGNIFICANT CHANGE UP
WBC # BLD: 7.88 K/UL — SIGNIFICANT CHANGE UP (ref 4.8–10.8)
WBC # FLD AUTO: 7.88 K/UL — SIGNIFICANT CHANGE UP (ref 4.8–10.8)
WBC UR QL: 19 /HPF — HIGH (ref 0–5)

## 2023-01-20 PROCEDURE — 99285 EMERGENCY DEPT VISIT HI MDM: CPT | Mod: FS

## 2023-01-20 PROCEDURE — 0042T: CPT | Mod: MA

## 2023-01-20 PROCEDURE — 93010 ELECTROCARDIOGRAM REPORT: CPT

## 2023-01-20 PROCEDURE — 70498 CT ANGIOGRAPHY NECK: CPT | Mod: 26,MA

## 2023-01-20 PROCEDURE — 70496 CT ANGIOGRAPHY HEAD: CPT | Mod: 26,MA

## 2023-01-20 PROCEDURE — 71045 X-RAY EXAM CHEST 1 VIEW: CPT | Mod: 26

## 2023-01-20 PROCEDURE — 70450 CT HEAD/BRAIN W/O DYE: CPT | Mod: 26,MA,59

## 2023-01-20 RX ORDER — ATORVASTATIN CALCIUM 80 MG/1
80 TABLET, FILM COATED ORAL AT BEDTIME
Refills: 0 | Status: DISCONTINUED | OUTPATIENT
Start: 2023-01-20 | End: 2023-01-22

## 2023-01-20 RX ORDER — DEXTROSE 50 % IN WATER 50 %
25 SYRINGE (ML) INTRAVENOUS ONCE
Refills: 0 | Status: DISCONTINUED | OUTPATIENT
Start: 2023-01-20 | End: 2023-01-22

## 2023-01-20 RX ORDER — CLOPIDOGREL BISULFATE 75 MG/1
75 TABLET, FILM COATED ORAL DAILY
Refills: 0 | Status: DISCONTINUED | OUTPATIENT
Start: 2023-01-21 | End: 2023-01-22

## 2023-01-20 RX ORDER — HEPARIN SODIUM 5000 [USP'U]/ML
5000 INJECTION INTRAVENOUS; SUBCUTANEOUS EVERY 8 HOURS
Refills: 0 | Status: DISCONTINUED | OUTPATIENT
Start: 2023-01-20 | End: 2023-01-22

## 2023-01-20 RX ORDER — CLOPIDOGREL BISULFATE 75 MG/1
300 TABLET, FILM COATED ORAL ONCE
Refills: 0 | Status: COMPLETED | OUTPATIENT
Start: 2023-01-20 | End: 2023-01-20

## 2023-01-20 RX ORDER — CARBIDOPA AND LEVODOPA 25; 100 MG/1; MG/1
2 TABLET ORAL THREE TIMES A DAY
Refills: 0 | Status: DISCONTINUED | OUTPATIENT
Start: 2023-01-20 | End: 2023-01-22

## 2023-01-20 RX ORDER — ENTACAPONE 200 MG/1
200 TABLET, FILM COATED ORAL
Refills: 0 | Status: DISCONTINUED | OUTPATIENT
Start: 2023-01-20 | End: 2023-01-22

## 2023-01-20 RX ORDER — DEXTROSE 50 % IN WATER 50 %
15 SYRINGE (ML) INTRAVENOUS ONCE
Refills: 0 | Status: DISCONTINUED | OUTPATIENT
Start: 2023-01-20 | End: 2023-01-22

## 2023-01-20 RX ORDER — LEVETIRACETAM 250 MG/1
500 TABLET, FILM COATED ORAL
Refills: 0 | Status: DISCONTINUED | OUTPATIENT
Start: 2023-01-20 | End: 2023-01-22

## 2023-01-20 RX ORDER — SODIUM CHLORIDE 9 MG/ML
1000 INJECTION INTRAMUSCULAR; INTRAVENOUS; SUBCUTANEOUS ONCE
Refills: 0 | Status: COMPLETED | OUTPATIENT
Start: 2023-01-20 | End: 2023-01-20

## 2023-01-20 RX ORDER — GLUCAGON INJECTION, SOLUTION 0.5 MG/.1ML
1 INJECTION, SOLUTION SUBCUTANEOUS ONCE
Refills: 0 | Status: DISCONTINUED | OUTPATIENT
Start: 2023-01-20 | End: 2023-01-22

## 2023-01-20 RX ORDER — ASPIRIN/CALCIUM CARB/MAGNESIUM 324 MG
81 TABLET ORAL DAILY
Refills: 0 | Status: DISCONTINUED | OUTPATIENT
Start: 2023-01-21 | End: 2023-01-22

## 2023-01-20 RX ORDER — INSULIN LISPRO 100/ML
VIAL (ML) SUBCUTANEOUS
Refills: 0 | Status: DISCONTINUED | OUTPATIENT
Start: 2023-01-20 | End: 2023-01-22

## 2023-01-20 RX ORDER — ASPIRIN/CALCIUM CARB/MAGNESIUM 324 MG
325 TABLET ORAL ONCE
Refills: 0 | Status: COMPLETED | OUTPATIENT
Start: 2023-01-20 | End: 2023-01-20

## 2023-01-20 RX ORDER — DEXTROSE 50 % IN WATER 50 %
12.5 SYRINGE (ML) INTRAVENOUS ONCE
Refills: 0 | Status: DISCONTINUED | OUTPATIENT
Start: 2023-01-20 | End: 2023-01-22

## 2023-01-20 RX ORDER — SODIUM CHLORIDE 9 MG/ML
1000 INJECTION, SOLUTION INTRAVENOUS
Refills: 0 | Status: DISCONTINUED | OUTPATIENT
Start: 2023-01-20 | End: 2023-01-22

## 2023-01-20 RX ADMIN — ATORVASTATIN CALCIUM 80 MILLIGRAM(S): 80 TABLET, FILM COATED ORAL at 22:41

## 2023-01-20 RX ADMIN — HEPARIN SODIUM 5000 UNIT(S): 5000 INJECTION INTRAVENOUS; SUBCUTANEOUS at 22:42

## 2023-01-20 RX ADMIN — SODIUM CHLORIDE 1000 MILLILITER(S): 9 INJECTION INTRAMUSCULAR; INTRAVENOUS; SUBCUTANEOUS at 20:15

## 2023-01-20 RX ADMIN — CLOPIDOGREL BISULFATE 300 MILLIGRAM(S): 75 TABLET, FILM COATED ORAL at 20:50

## 2023-01-20 RX ADMIN — LEVETIRACETAM 500 MILLIGRAM(S): 250 TABLET, FILM COATED ORAL at 22:42

## 2023-01-20 RX ADMIN — ENTACAPONE 200 MILLIGRAM(S): 200 TABLET, FILM COATED ORAL at 22:41

## 2023-01-20 RX ADMIN — CARBIDOPA AND LEVODOPA 2 TABLET(S): 25; 100 TABLET ORAL at 22:41

## 2023-01-20 RX ADMIN — Medication 325 MILLIGRAM(S): at 20:51

## 2023-01-20 NOTE — ED PROVIDER NOTE - PHYSICAL EXAMINATION
Gen: NAD, AOx2 to person and place   Head: NCAT  HEENT: PERRL, oral mucosa moist, normal conjunctiva, oropharynx clear without exudate or erythema  Lung: CTAB, no respiratory distress, no wheezing, rales, rhonchi  CV: normal s1/s2, rrr, Normal perfusion, pulses 2+ throughout  Abd: soft, NTND, no CVA tenderness  Genitourinary: no pelvic tenderness  MSK: No edema, no visible deformities, full range of motion in all 4 extremities  Neuro: CN II-XII grossly intact, No focal neurologic deficits, no nystagmus/pronator drift, coordination/sensation intact, strength 5/5 BUE/BLE, steady gait   Skin: No rash   Psych: normal affect

## 2023-01-20 NOTE — ED ADULT NURSE NOTE - INTERVENTIONS DEFINITIONS
[place] to call system/Call bell, personal items and telephone within reach/Instruct patient to call for assistance/Physically safe environment: no spills, clutter or unnecessary equipment/Stretcher in lowest position, wheels locked, appropriate side rails in place/Monitor gait and stability/Monitor for mental status changes and reorient to person, place, and time/Reinforce activity limits and safety measures with patient and family/Provide visual clues: red socks

## 2023-01-20 NOTE — H&P ADULT - ASSESSMENT
Mr. Ambriz is a 72 years old male with PMH of Stroke, Seizure, DM, PD BIBEMS for sudden onset confusion and work finding difficulty. NIHSS : 2, iamging didn't reveal any abnormalities. Patient is being admitted to stroke unit for further workup. Differentials include stroke vs TIA vs Seizure    Neuro  #R/O stroke  #H/O Seizure  #parkinson's Disease  - s/p  mg and Palvix 300 mg stat  - continue aspirin 81mg and plavix 75mg daily  - started atorvastatin 80mg daily  - q4hr stroke neuro checks and vitals  - obtain MRI Brain without contrast  - Stroke Code HCT Results: reviewed  - Stroke Code CTA Results: reviewed  - Stroke education  - continue Keppra 500 mg BID  - Consider EEG  - Continue his home PD meds: Sinemet 25/100: 2 tab TID, Entacapone 1 tab 4 times a day ( Rivastigmine patch is non formulary)  - Holding his sleep meds for now    Cards  #HTN  - permissive hypertension, Goal -180  - hold home blood pressure medication for now ( was on Lisinopril)  - obtain TTE   - Stroke Code EKG Results: pending official read    #HLD  - high dose statin as above in CVA  - LDL results: pending    Pulm  - call provider if SPO2 < 94%    GI  #Nutrition/Fluids/Electrolytes   - replete K<4 and Mg <2  - Diet: NPO for now  - IVF: NaCl bolus    Renal  - daily BMP    Infectious Disease  - Stroke Code CXR results: clear    Endocrine  #DM  - A1C results: pending  - ISS: low dose    - TSH results: pending    DVT Prophylaxis  - heparin sq for DVT prophylaxis   - SCDs for DVT prophylaxis            Discussed daily hospital plans and goals with patient at bedside.  Discussed with Neurology Attending

## 2023-01-20 NOTE — ED PROVIDER NOTE - PROGRESS NOTE DETAILS
co- brief note, after obtaining collateral from NH, pt was last normal around 2pm, pt noticed to have SS at 330pm which resolved. pt typically aaox3 and is currently aao- person/place and not time. no fnd. given c/f tia within 6 hrs vs ongoing process, stroke code called

## 2023-01-20 NOTE — STROKE CODE NOTE - ASSESSMENT/PLAN
Pt staffed with neuro resident via telephone. He presents from [facility] with episode of speech difficulty, which was noted by family while talking to him on the phone at 1530. Per facility, he was at baseline at 1400. Neurology resident notes slow speech, however no loss of fluency, naming/repetition/comprehension/reading intact per report. Pt is not a TNK candidate due to mild, nondisabling symptoms. No ELVO/perfusion mismatch for EVT consideration. Recommend ASA/plavix load, likely admit to stroke unit pending lab results. Pt staffed with neuro resident via telephone. He presents from [facility] with episode of speech difficulty, which was noted by family while talking to him on the phone at 1530. Per facility, he was at baseline at 1400. Neurology resident notes slow speech, however no significant loss of fluency, naming/repetition/comprehension/reading intact per report. Pt is not a TNK candidate due to mild, nondisabling symptoms. No ELVO/perfusion mismatch for EVT consideration. Recommend ASA/plavix load, likely admit to stroke unit pending lab results.

## 2023-01-20 NOTE — H&P ADULT - NSHPPHYSICALEXAM_GEN_ALL_CORE
Physical exam:  Constitutional: No acute distress, conversant  Eyes: Anicteric sclerae, moist conjunctivae, see below for CNs  Neck: trachea midline, FROM, supple,   Cardiovascular: Regular rate and rhythm,   Pulmonary: Anterior breath sounds clear bilaterally,   GI: Abdomen soft, non-distended, non-tender      Neurologic:  -Mental status: Awake, alert, oriented to person, place, but not time. Speech is mostly fluent with some difficulty with naming (5/6), but intact  repetition, and comprehension, no dysarthria Follows commands. Attention/concentration intact.  -Cranial nerves:   II: Visual fields are full to confrontation.  III, IV, VI: Extraocular movements are intact without nystagmus. Pupils equally round and reactive to light  V:  Facial sensation V1-V3 equal and intact   VII: Face is symmetric with normal eye closure and smile  VIII: Hearing is bilaterally intact to finger rub  IX, X: Uvula is midline and soft palate rises symmetrically  XI: Head turning and shoulder shrug are intact.  XII: Tongue protrudes midline  Motor: Normal bulk and tone. No pronator drift. Strength bilateral upper extremity 5/5, bilateral lower extremities 5/5.  Rapid alternating movements intact and symmetric  Sensation: Intact to light touch bilaterally. No neglect or extinction on double simultaneous testing.  Coordination: No dysmetria on finger-to-nose and heel-to-shin bilaterally  Reflexes: Downgoing toes bilaterally       NIHSS:2

## 2023-01-20 NOTE — H&P ADULT - NSHPLABSRESULTS_GEN_ALL_CORE
Fingerstick Blood Glucose: CAPILLARY BLOOD GLUCOSE  118 (20 Jan 2023 18:25)      POCT Blood Glucose.: 137 mg/dL (20 Jan 2023 17:22)    LABS:                        15.8   7.88  )-----------( 134      ( 20 Jan 2023 18:33 )             43.5     01-20    140  |  103  |  13  ----------------------------<  103<H>  4.2   |  25  |  1.0    Ca    8.7      20 Jan 2023 18:33    TPro  5.9<L>  /  Alb  3.8  /  TBili  0.8  /  DBili  x   /  AST  13  /  ALT  <5  /  AlkPhos  71  01-20    PT/INR - ( 20 Jan 2023 18:33 )   PT: 12.10 sec;   INR: 1.06 ratio         PTT - ( 20 Jan 2023 18:33 )  PTT:28.9 sec  CARDIAC MARKERS ( 20 Jan 2023 18:33 )  x     / <0.01 ng/mL / x     / x     / x          Urinalysis Basic - ( 20 Jan 2023 20:32 )    Color: Yellow / Appearance: Clear / SG: >1.050 / pH: x  Gluc: x / Ketone: Negative  / Bili: Negative / Urobili: <2 mg/dL   Blood: x / Protein: Trace / Nitrite: Negative   Leuk Esterase: Moderate / RBC: 1 /HPF / WBC 19 /HPF   Sq Epi: x / Non Sq Epi: 0 /HPF / Bacteria: Negative        < from: CT Angio Neck Stroke Protocol w/ IV Cont (01.20.23 @ 18:00) >    CT PERFUSION:  No perfusion deficits to suggest areas of completed infarction or at risk   territory.    CTA HEAD/NECK:  No large vessel occlusion, aneurysm, or vascular malformation.    Mild atherosclerotic stenosis in the bilateral supraclinoid ICAs.    Severe left/moderate right atherosclerotic stenosis in the V4 segments of   the bilateral vertebral arteries. Moderate focal stenosis in the V1   segment of the right vertebral artery.    Focal high-grade stenosis in the inferior branch of the left P4 PCA.      < from: CT Brain Stroke Protocol (01.20.23 @ 17:31) >    No acute intracranial hemorrhage, midline shift, or mass effect.    Stable moderate chronic microvascular changes and chronic lacunar   infarcts.

## 2023-01-20 NOTE — ED ADULT TRIAGE NOTE - CHIEF COMPLAINT QUOTE
Pt biba from [facility], sent in for slurred speech, unsure of last known well, in triage pt speech clear, no neuro deficits, fs 118 by ems pta

## 2023-01-20 NOTE — H&P ADULT - HISTORY OF PRESENT ILLNESS
72 years old male with PMH of Stroke, H/O seizure, Parkinson's disease, DM presented to ED from Assisted living facility when he was found to have confusion and unable to speak. He was seen alst well around 1-2 pm by the  who gave him medication. Around 3:30 pm, the facility received a call from patient's family that he/she was talking to him over phone and he was not making sense. 911 was called, and he was sent to ED. By that time , he started to feel better. as per patient, the last thing he remembered was watching tv and making the phone call but he was not able to remember what happened exactly after that. But now he is feeling better. Denies any headache, blurry vision, slurring of speech, weakness, dizziness. Denies any fall or head trauma. Spoke to Facility, no witnessed seizure like activity.   Stroke code was activated, NIHSS:2. Imaging was obtained: no acute abnormalities noted. Patient is being admitted to Stroke unit for further workup.

## 2023-01-20 NOTE — PATIENT PROFILE ADULT - FALL HARM RISK - HARM RISK INTERVENTIONS

## 2023-01-20 NOTE — ED PROVIDER NOTE - CONSIDERATION OF ADMISSION OBSERVATION
pt will require admission for neurochecks as they are not baseline and for further w/u given comorbid conditions Consideration of Admission/Observation

## 2023-01-20 NOTE — PATIENT PROFILE ADULT - NSPROGENOTHERPROVIDER_GEN_A_NUR
Discharge instructions and medications reviewed, patient verbalized understanding     Angelique Desir RN  01/02/21 9643 none

## 2023-01-20 NOTE — ED PROVIDER NOTE - OBJECTIVE STATEMENT
73 yo male with a pmh of htn, cva, dm, and seizures BIBA for slurred speech. pt lives at a nursing facility who states around 3:30pm they noted the pt's speech to be slurred and his last known normal was 2:00pm. currently pt's slurred speech has resolves but he is a&o x2 and is a&o x3 at baseline. pt denies any other symptoms including fevers, chill, headache, recent illness/travel, cough, abdominal pain, chest pain, or SOB.

## 2023-01-20 NOTE — ED PROVIDER NOTE - CLINICAL SUMMARY MEDICAL DECISION MAKING FREE TEXT BOX
cth/ctp w/o acute findings, cta findings noted  pt evaluated by neuro who rec admission to stroke unit for further w/u and monitoring

## 2023-01-20 NOTE — ED PROVIDER NOTE - DIFFERENTIAL DIAGNOSIS
c/f cva/tia, no reported sz, pt denying infectious complaints, r/o lyte abnormality Differential Diagnosis

## 2023-01-20 NOTE — ED PROVIDER NOTE - ATTENDING APP SHARED VISIT CONTRIBUTION OF CARE
72 M with PMH of Parkinson's disease, gout, HTN, CVA ('98 and '16), DM, sz  pt presents for eval of episode of SS.  pt cannot recall exactly why he was brought to hospital but collateral from facility staff states pt was last normal at 2pm. at 330pm he was found to have episode of SS. SS resolved, however, pt appeared a bit confused (not oriented to time). pt states he feels fine, no c/o cp, ap, sob, n,v,d,trauma, syncope, focal numbness/weakness/vision complaints.    vss  gen- NAD, aaox2  card-rrr  lungs-ctab, no wheezing or rhonchi  abd-sntnd, no guarding or rebound  neuro- full str/sensation, cn ii-xii grossly intact, normal coordination

## 2023-01-21 LAB
GLUCOSE BLDC GLUCOMTR-MCNC: 116 MG/DL — HIGH (ref 70–99)
GLUCOSE BLDC GLUCOMTR-MCNC: 78 MG/DL — SIGNIFICANT CHANGE UP (ref 70–99)
GLUCOSE BLDC GLUCOMTR-MCNC: 85 MG/DL — SIGNIFICANT CHANGE UP (ref 70–99)

## 2023-01-21 PROCEDURE — 70551 MRI BRAIN STEM W/O DYE: CPT | Mod: 26

## 2023-01-21 PROCEDURE — 99232 SBSQ HOSP IP/OBS MODERATE 35: CPT

## 2023-01-21 PROCEDURE — 99233 SBSQ HOSP IP/OBS HIGH 50: CPT

## 2023-01-21 RX ORDER — LABETALOL HCL 100 MG
10 TABLET ORAL DAILY
Refills: 0 | Status: DISCONTINUED | OUTPATIENT
Start: 2023-01-21 | End: 2023-01-22

## 2023-01-21 RX ADMIN — CARBIDOPA AND LEVODOPA 2 TABLET(S): 25; 100 TABLET ORAL at 06:11

## 2023-01-21 RX ADMIN — ATORVASTATIN CALCIUM 80 MILLIGRAM(S): 80 TABLET, FILM COATED ORAL at 21:12

## 2023-01-21 RX ADMIN — CARBIDOPA AND LEVODOPA 2 TABLET(S): 25; 100 TABLET ORAL at 21:12

## 2023-01-21 RX ADMIN — ENTACAPONE 200 MILLIGRAM(S): 200 TABLET, FILM COATED ORAL at 06:11

## 2023-01-21 RX ADMIN — HEPARIN SODIUM 5000 UNIT(S): 5000 INJECTION INTRAVENOUS; SUBCUTANEOUS at 21:11

## 2023-01-21 RX ADMIN — HEPARIN SODIUM 5000 UNIT(S): 5000 INJECTION INTRAVENOUS; SUBCUTANEOUS at 06:10

## 2023-01-21 RX ADMIN — Medication 81 MILLIGRAM(S): at 12:11

## 2023-01-21 RX ADMIN — LEVETIRACETAM 500 MILLIGRAM(S): 250 TABLET, FILM COATED ORAL at 06:11

## 2023-01-21 RX ADMIN — LEVETIRACETAM 500 MILLIGRAM(S): 250 TABLET, FILM COATED ORAL at 17:46

## 2023-01-21 RX ADMIN — CARBIDOPA AND LEVODOPA 2 TABLET(S): 25; 100 TABLET ORAL at 13:51

## 2023-01-21 RX ADMIN — ENTACAPONE 200 MILLIGRAM(S): 200 TABLET, FILM COATED ORAL at 17:46

## 2023-01-21 RX ADMIN — HEPARIN SODIUM 5000 UNIT(S): 5000 INJECTION INTRAVENOUS; SUBCUTANEOUS at 13:50

## 2023-01-21 RX ADMIN — CLOPIDOGREL BISULFATE 75 MILLIGRAM(S): 75 TABLET, FILM COATED ORAL at 12:11

## 2023-01-21 RX ADMIN — ENTACAPONE 200 MILLIGRAM(S): 200 TABLET, FILM COATED ORAL at 12:11

## 2023-01-21 NOTE — SPEECH LANGUAGE PATHOLOGY EVALUATION - SLP CONVERSATIONAL SPEECH
Engages in complex conversations with moments of word-finding difficulty as well as moments of losing his train of thought.

## 2023-01-21 NOTE — OCCUPATIONAL THERAPY INITIAL EVALUATION ADULT - PLANNED THERAPY INTERVENTIONS, OT EVAL
To set goals once full assessment complete/ADL retraining/balance training/bed mobility training ADL retraining/balance training/bed mobility training/transfer training

## 2023-01-21 NOTE — SPEECH LANGUAGE PATHOLOGY EVALUATION - SLP CRITERIA FOR SKILLED THERAPEUTIC INTERVENTIONS MET
29 year old Male with PMHx of Seizures (since 2014 with episodes every month), on Keppra presents from home with complaints of recurrent seizures. Patient mentioned having had an unwitnessed seizures at home and he knew because he has always has severe headaches during the post ictal period and prodromal symptoms of intense Anxiety. Patient was in ED earlier but eloped before he got evaluated only to return as his mother would not take him without a full evaluation for the seizures. He reports being compliant with Keppra, no prior levels checked. He denies any use of recreational drugs. Patient was thus admitted for VEEG which showed several episodes of seizures and still requires monitoring and an MRI.
skilled criteria for intervention met

## 2023-01-21 NOTE — OCCUPATIONAL THERAPY INITIAL EVALUATION ADULT - PERTINENT HX OF CURRENT PROBLEM, REHAB EVAL
Pt is a right hand dominant male admitted 1/20 with PMH of Stroke, H/O seizure, Parkinson's disease, DM presented to ED from Assisted living facility when he was found to have confusion and unable to speak. He was seen alst well around 1-2 pm by the  who gave him medication. Around 3:30 pm, the facility received a call from patient's family that he/she was talking to him over phone and he was not making sense. 911 was called, and he was sent to ED. By that time , he started to feel better. as per patient, the last thing he remembered was watching tv and making the phone call but he was not able to remember what happened exactly after that. But now he is feeling better. Denies any headache, blurry vision, slurring of speech, weakness, dizziness. Denies any fall or head trauma. Spoke to Facility, no witnessed seizure like activity. Stroke code was activated, NIHSS:2. Imaging was obtained: no acute abnormalities noted. Patient is being admitted to Stroke unit for further workup.

## 2023-01-21 NOTE — SPEECH LANGUAGE PATHOLOGY EVALUATION - SLP DIAGNOSIS
Pt presents with moderate expressive/receptive language deficits in the areas of word finding, auditory comprehension and processing. Pt also presents with attention and short term memory deficits.

## 2023-01-21 NOTE — SPEECH LANGUAGE PATHOLOGY EVALUATION - COMMENTS
Pt reports h/o CVA ~25 years ago w/o residual deficits. Pt says that over the past 5 years he has noticed a decline in his memory and increased difficulties with word finding. WNL Pt is motivated to participate in speech/lang therapy. DNT; pt reports difficultly writing Pt at time required extra time and repetition of the question to accurate answer question/complete task. Pt reports declining short-term memory x5 years.

## 2023-01-21 NOTE — OCCUPATIONAL THERAPY INITIAL EVALUATION ADULT - TRANSFER TRAINING, PT EVAL
Pt will increase sit to stand to distant supervision to increase independence with functional transfers.

## 2023-01-21 NOTE — OCCUPATIONAL THERAPY INITIAL EVALUATION ADULT - BALANCE TRAINING, PT EVAL
Pt will increase standing balance by 1/3 grade to increase safety and decrease risk to fall by discharge

## 2023-01-21 NOTE — OCCUPATIONAL THERAPY INITIAL EVALUATION ADULT - SPECIFY REASON(S)
Unable to complete full IE at this time. Upon transitioned EOB pt's /94 (SBP parameters 120-180). Called Neuro 1604, stated to hold OOB activity at time pending BP management. OT to follow up Unable to complete full IE at 11:05. Upon transitioned EOB pt's /94 (SBP parameters 120-180). Called Neuro 1604, stated to hold OOB activity at time pending BP management. 2pm /96 mmHg

## 2023-01-21 NOTE — SPEECH LANGUAGE PATHOLOGY EVALUATION - SLP VERBAL EXPRESSION
+word finding difficulties, evidenced by use of filler words "um, uh" and pauses mid sentence./intact

## 2023-01-21 NOTE — SPEECH LANGUAGE PATHOLOGY EVALUATION - SLP AUTOMATIC SPEECH
counting- WNL. days of week- Only said monday through friday. months of year- began counting and required max cuing and first 3 months to get him in set./impaired

## 2023-01-21 NOTE — OCCUPATIONAL THERAPY INITIAL EVALUATION ADULT - ADDITIONAL COMMENTS
-- DO NOT REPLY / DO NOT REPLY ALL --  -- Message is from [facility] ([facility]) --    General Patient Message: Mom calling to schedule nurse visit for HPV 2nd dose.    Caller Information       Type Contact Phone/Fax    01/11/2023 09:27 AM CST Phone (Incoming) KENDY BOWENS (Mother) [phone]        Alternative phone number: no    Can a detailed message be left? Yes    Message Turnaround:     Is it Working Hours? Yes - Working Hours     [place]:    Please give this turnaround time to the caller:   \"This message will be sent to [state Provider's name]. The clinical team will fulfill your request as soon as they review your message.\"                 pt states he was responsible and managed medications independently PTA

## 2023-01-21 NOTE — OCCUPATIONAL THERAPY INITIAL EVALUATION ADULT - PATIENT PROFILE REVIEW, REHAB EVAL
Pt's chart reviewed. Pt seen for partial eval 11:05-11:20/yes Pt's chart reviewed. Pt seen for partial eval 11:05-11:20, eval completed 1:55-2:05/yes

## 2023-01-21 NOTE — SPEECH LANGUAGE PATHOLOGY EVALUATION - SLP PERTINENT HISTORY OF CURRENT PROBLEM
Mr. Ambriz is a 72 years old male with PMH of Stroke, Seizure, DM, PD BIBEMS for sudden onset confusion and work finding difficulty. NIHSS : 2, iamging didn't reveal any abnormalities. Patient is being admitted to stroke unit for further workup. Differentials include stroke vs TIA vs Seizure

## 2023-01-21 NOTE — OCCUPATIONAL THERAPY INITIAL EVALUATION ADULT - LEVEL OF INDEPENDENCE: SIT/STAND, REHAB EVAL
unable to assess oob 2* pt SBP outside of parameters, per MD hold oob activity/unable to perform contact guard

## 2023-01-21 NOTE — CONSULT NOTE ADULT - SUBJECTIVE AND OBJECTIVE BOX
HPI:  72 years old right-handed male with PMH of Stroke, H/O seizure, Parkinson's disease, DM presented to ED from Assisted living facility when he was found to have confusion and unable to speak. He was seen alst well around 1-2 pm by the  who gave him medication. Around 3:30 pm, the facility received a call from patient's family that he/she was talking to him over phone and he was not making sense. 911 was called, and he was sent to ED. By that time , he started to feel better. as per patient, the last thing he remembered was watching tv and making the phone call but he was not able to remember what happened exactly after that. But now he is feeling better. Denies any headache, blurry vision, slurring of speech, weakness, dizziness. Denies any fall or head trauma. Spoke to Facility, no witnessed seizure like activity.   Stroke code was activated, NIHSS:2. Imaging was obtained: no acute abnormalities noted. Patient is being admitted to Stroke unit for further workup. (20 Jan 2023 21:29)      PAST MEDICAL & SURGICAL HISTORY:  Parkinson disease      CVA (cerebral vascular accident)      HTN (hypertension)      Diabetes mellitus      Gout      No significant past surgical history          HOSPITAL COURSE:  Imaging studies obtained. OT unable to evaluate patient this morning secondary to hypertension. PT and ST evaluation pending.    TODAY'S SUBJECTIVE & REVIEW OF SYMPTOMS:    Constitutional WNL  Cardiac WNL   Respiratory WNL  GI WNL   WNL  Endocrine WNL  Skin WNL  Musculoskeletal WNL  Neuro WNL  Cognitive +some memory loss  Psych WNL      MEDICATIONS  (STANDING):  aspirin enteric coated 81 milliGRAM(s) Oral daily  atorvastatin 80 milliGRAM(s) Oral at bedtime  carbidopa/levodopa  25/100 2 Tablet(s) Oral three times a day  clopidogrel Tablet 75 milliGRAM(s) Oral daily  dextrose 5%. 1000 milliLiter(s) (50 mL/Hr) IV Continuous <Continuous>  dextrose 5%. 1000 milliLiter(s) (100 mL/Hr) IV Continuous <Continuous>  dextrose 50% Injectable 25 Gram(s) IV Push once  dextrose 50% Injectable 12.5 Gram(s) IV Push once  dextrose 50% Injectable 25 Gram(s) IV Push once  entacapone 200 milliGRAM(s) Oral four times a day  glucagon  Injectable 1 milliGRAM(s) IntraMuscular once  heparin   Injectable 5000 Unit(s) SubCutaneous every 8 hours  insulin lispro (ADMELOG) corrective regimen sliding scale   SubCutaneous three times a day before meals  levETIRAcetam 500 milliGRAM(s) Oral two times a day    MEDICATIONS  (PRN):  dextrose Oral Gel 15 Gram(s) Oral once PRN Blood Glucose LESS THAN 70 milliGRAM(s)/deciliter  labetalol Injectable 10 milliGRAM(s) IV Push daily PRN Systolic/Diastolic >      FAMILY HISTORY:  FH: hypertension    FH: CAD (coronary artery disease)        Allergies    cefaclor (Unknown)  penicillin (Unknown)  sulfa drugs (Unknown)    Intolerances        SOCIAL HISTORY:    [  ] Smoking  [  ] EtOH  [  ] Substance abuse     Home Environment:  [  ] Alone  [  ] Lives with Family  [  ] Home Health Aide    Dwelling:  [  ] Private House  [  ] Apartment  [ X ] Adult Home/Assisted Living Facility  [  ] Skilled Nursing Facility      [  ] Short Term  [  ] Long Term  [  ] Stairs       Elevator [  ]    FUNCTIONAL STATUS PTA: (Check all that apply)  Ambulation: [ X  ]Independent    [  ] Dependent     [  ] Non-Ambulatory  Assistive Device: [  ] Straight Cane  [  ]  Quad Cane  [ X ] Rolling Walker  [  ]  Wheelchair  ADL: [ X ] Independent  [  ]  Dependent       Vital Signs Last 24 Hrs  T(C): 36.2 (21 Jan 2023 08:00), Max: 36.7 (20 Jan 2023 15:55)  T(F): 97.1 (21 Jan 2023 08:00), Max: 98 (20 Jan 2023 15:55)  HR: 66 (21 Jan 2023 12:00) (55 - 74)  BP: 162/95 (21 Jan 2023 12:00) (134/76 - 162/95)  BP(mean): 122 (21 Jan 2023 12:00) (116 - 122)  RR: 18 (21 Jan 2023 12:00) (17 - 20)  SpO2: 98% (21 Jan 2023 12:00) (96% - 99%)    Parameters below as of 21 Jan 2023 12:00  Patient On (Oxygen Delivery Method): room air          PHYSICAL EXAM: Alert, some memory loss  GENERAL: Well-developed, well-nourished, in NAD  HEAD:  Normocephalic, atraumatic  EYES: EOMI, PERRLA, sclerae anicteric, conjunctivae not injected  NECK: Supple, No JVD, Normal thyroid  CHEST/LUNG: Clear to auscultation bilaterally; No rales, rhonchi, wheezing  HEART: Regular rate and rhythm; No murmurs, gallops, or rubs  ABDOMEN: Soft, nontender, nondistended; Bowel sounds present  EXTREMITIES:  2+ Peripheral pulses; No clubbing, cyanosis, or edema    NERVOUS SYSTEM:  Cranial Nerves II-XII intact [  ] Abnormal  [  ] +masked facies, hypophonic speech  ROM: WFL all extremities [ X ]  Abnormal [  ]  Motor Strength: WFL all extremities  [X  ]  Abnormal [  ]  Sensation: intact to light touch [X  ] Abnormal [  ]  Reflexes: Symmetric [  ]  Abnormal [  ]    FUNCTIONAL STATUS:  Bed Mobility: Independent [  ]  Supervision [ X ]  Needs Assistance [  ]  N/A [  ]  Transfers: Independent [  ]  Supervision [  ]  Needs Assistance [ X ]  N/A [  ]   Ambulation: Independent [  ]  Supervision [  ]  Needs Assistance [X  ]  N/A [  ]  ADLs: Independent [  ] Requires Assistance [X  ] N/A [  ]      LABS:                        15.8   7.88  )-----------( 134      ( 20 Jan 2023 18:33 )             43.5     01-20    140  |  103  |  13  ----------------------------<  103<H>  4.2   |  25  |  1.0    Ca    8.7      20 Jan 2023 18:33    TPro  5.9<L>  /  Alb  3.8  /  TBili  0.8  /  DBili  x   /  AST  13  /  ALT  <5  /  AlkPhos  71  01-20    PT/INR - ( 20 Jan 2023 18:33 )   PT: 12.10 sec;   INR: 1.06 ratio         PTT - ( 20 Jan 2023 18:33 )  PTT:28.9 sec  Urinalysis Basic - ( 20 Jan 2023 20:32 )    Color: Yellow / Appearance: Clear / SG: >1.050 / pH: x  Gluc: x / Ketone: Negative  / Bili: Negative / Urobili: <2 mg/dL   Blood: x / Protein: Trace / Nitrite: Negative   Leuk Esterase: Moderate / RBC: 1 /HPF / WBC 19 /HPF   Sq Epi: x / Non Sq Epi: 0 /HPF / Bacteria: Negative        RADIOLOGY & ADDITIONAL STUDIES:  < from: CT Brain Stroke Protocol (01.20.23 @ 17:31) >    ACC: [id number] EXAM:  CT BRAIN STROKE PROTOCOL   ORDERED BY: CORRINE JOSHI     PROCEDURE DATE:  01/20/2023          INTERPRETATION:  CLINICAL INDICATION : Transient difficulty with   word-finding    TECHNIQUE: CT of the head was performed withoutthe administration of   intravenous contrast.    COMPARISON: CT head 9/22/2022, MRI brain 9/26/2022    FINDINGS:    There is stable prominence of the ventricles and sulci appropriate for   age.    There are stable confluent periventricular white matter hypodensities   consistent with moderate chronic microvascular signal changes. There are   additional stable hypodensities in the right thalamus and left basal   ganglia likely represent chronic lacunar infarcts.    There is no evidence of intraparenchymal hematoma, mass effect, or   midline shift. There is no extra axial fluid collection. There is no   hydrocephalus.    The visualized orbital contents appear normal. The paranasal sinuses and   mastoid air cells are well aerated. Visualized soft tissues and osseous   structures appear normal.    IMPRESSION:    No acute intracranial hemorrhage, midline shift, or mass effect.    Stable moderate chronic microvascular changes and chronic lacunar   infarcts.    Communication: The summary of above findings were discussed with readback   confirmation with Dr. Soto by resident Alhaji Rivas MD on 1/20/2023 at   5:49PM.    --- End of Report ---      < end of copied text >  < from: CT Brain Perfusion Maps Stroke (01.20.23 @ 17:49) >    ACC: [id number] EXAM:  CT ANGIO BRAIN STROKE PROTC IC   ORDERED BY: CORRINE JOSHI     ACC: [id number] EXAM:  CT ANGIO NECK STROKE PROTCL IC   ORDERED BY: CORRINE JOSHI     ACC: [id number] EXAM:  CT BRAIN PERFUSION MAPS STROKE   ORDERED BY: CORRINE JOSHI     PROCEDURE DATE:  01/20/2023          INTERPRETATION:  CLINICAL INDICATION: Stroke alert, transient difficulty   with word finding    TECHNIQUE: CT perfusion of the brain was performed following the bolus   administration of intravenous contrast. Source images were postprocessed   on an independent workstation under direct physician supervision and   archived to the PACS. CTA of the head and neck was performed after the   intravenous administration of of contrast. 3-D reconstructions were   performed under physician supervision on a separate workstation and   reviewed. A total of 50 mL Omnipaque 350 nonionic IV contrast was   administered.    NASCET CRITERIA FOR CAROTID STENOSIS:  Mild: 0% to 49%, Moderate: 50% to 69%, Severe: 70% to 99%, Complete   Occlusion    COMPARISON: None available.    FINDINGS:    CT PERFUSION:  No perfusion deficits to suggest areas of completed infarction or at risk   territory.    The cerebral blood volume and time to peak images demonstrate no   significant evidence of fixed or mismatched focal perfusion deficit or   surrounding ischemic penumbra to suggest acute cerebral ischemia or   infarct.    CTA HEAD/NECK:    AORTIC ARCH: There are mild calcific atherosclerotic plaques without   flow-limiting stenosis.    RIGHT ANTERIOR CIRCULATION:  The common carotid artery (CCA) is patent up to the bulb without   stenosis. There are mild calcific atherosclerotic plaques in the carotid   bulb extending into the proximal cervical ICA without stenosis.There is   a mild calcific atherosclerotic plaque in the proximal external carotid   artery (ECA) without flow-limiting stenosis. The cervical internal   carotid artery (ICA) is patent. There is mild calcific/noncalcific   atherosclerotic plaque in the carotid siphon contributing to mild   stenosis in the supraclinoid ICA.    The anterior and middle cerebral arteries (FITO/MCA) are patent without   stenosis.      LEFT ANTERIOR CIRCULATION:  The CCA is patent up to the bulb without stenosis. There are mild   calcific/noncalcific atherosclerotic plaques in the carotid bulb without   flow-limiting stenosis. The ECA and its proximal branches are patent   without stenosis. The cervical ICA is patent without stenosis.. There are   mild calcific atherosclerotic plaques in the carotid siphon contributing   to mild stenosis in the supraclinoid ICA.    The anterior and middle cerebral arteries are patent without stenosis.      POSTERIOR CIRCULATION:  There is a focal moderate stenosis in the V1 segment of the right   vertebral artery. There are additional calcific and noncalcific   atherosclerotic plaques in the bilateral V4 segments of the vertebral   arteries contributing to moderate stenosis in the right and severe focal   stenosis in the left. The basilar artery is patent without stenosis. The   proximal branch vasculature of the posterior circulation are within   normal limits.    The right posterior cerebral artery is patent. There is high-grade focal   stenosis in the inferior branch of the P4 segment of the left PCA.    There is no evidence for saccular aneurysm, vascular malformation, or   large vessel occlusion.    The visualized lung apices are unremarkable.    OTHERS:  Please see separately dictated CT head for the intracranial findings.      IMPRESSION:    CT PERFUSION:  No perfusion deficits to suggest areas of completed infarction or at risk   territory.    CTA HEAD/NECK:  No large vessel occlusion, aneurysm, or vascular malformation.    Mild atherosclerotic stenosis in the bilateral supraclinoid ICAs.    Severe left/moderate right atherosclerotic stenosis in the V4 segments of   the bilateral vertebral arteries. Moderate focal stenosis in the V1   segment of the right vertebral artery.    Focal high-grade stenosisin the inferior branch of the left P4 PCA.        --- End of Report ---      < end of copied text >  < from: Xray Chest 1 View AP/PA (01.20.23 @ 20:01) >    ACC: [id number] EXAM:  XR CHEST 1 VIEW   ORDERED BY: CORRINE JOSHI     PROCEDURE DATE:  01/20/2023          INTERPRETATION:  Clinical History/Reason for Exam:  Stroke Code    Technique:  Frontal view the chest.    Comparison: Chest x-ray 9/22/2022.    Findings:    Support devices:  none    Cardiac/mediastinum/hilum: Unremarkable    Lung parenchyma/ Pleura: No focal parenchymal opacities, effusion or   pneumothorax is present.      Skeleton/soft tissues: No focal skeletal lesions are identified.      Impression:    No radiographic evidence of cardiopulmonary disease.    --- End of Report ---      < end of copied text >     HPI:  72 years old right-handed male with PMH of Stroke, H/O seizure, Parkinson's disease, DM presented to ED from Assisted living facility when he was found to have confusion and unable to speak. He was seen alst well around 1-2 pm by the  who gave him medication. Around 3:30 pm, the facility received a call from patient's family that he/she was talking to him over phone and he was not making sense. 911 was called, and he was sent to ED. By that time , he started to feel better. as per patient, the last thing he remembered was watching tv and making the phone call but he was not able to remember what happened exactly after that. But now he is feeling better. Denies any headache, blurry vision, slurring of speech, weakness, dizziness. Denies any fall or head trauma. Spoke to Facility, no witnessed seizure like activity.   Stroke code was activated, NIHSS:2. Imaging was obtained: no acute abnormalities noted. Patient is being admitted to Stroke unit for further workup. (20 Jan 2023 21:29)      PAST MEDICAL & SURGICAL HISTORY:  Parkinson disease      CVA (cerebral vascular accident)      HTN (hypertension)      Diabetes mellitus      Gout      No significant past surgical history          HOSPITAL COURSE:  Imaging studies obtained. OT unable to complete evaluation of patient this morning secondary to hypertension. PT evaluation pending.    TODAY'S SUBJECTIVE & REVIEW OF SYMPTOMS:    Constitutional WNL  Cardiac WNL   Respiratory WNL  GI WNL   WNL  Endocrine WNL  Skin WNL  Musculoskeletal WNL  Neuro WNL  Cognitive +some memory loss, +word-finding deficits  Psych WNL      MEDICATIONS  (STANDING):  aspirin enteric coated 81 milliGRAM(s) Oral daily  atorvastatin 80 milliGRAM(s) Oral at bedtime  carbidopa/levodopa  25/100 2 Tablet(s) Oral three times a day  clopidogrel Tablet 75 milliGRAM(s) Oral daily  dextrose 5%. 1000 milliLiter(s) (50 mL/Hr) IV Continuous <Continuous>  dextrose 5%. 1000 milliLiter(s) (100 mL/Hr) IV Continuous <Continuous>  dextrose 50% Injectable 25 Gram(s) IV Push once  dextrose 50% Injectable 12.5 Gram(s) IV Push once  dextrose 50% Injectable 25 Gram(s) IV Push once  entacapone 200 milliGRAM(s) Oral four times a day  glucagon  Injectable 1 milliGRAM(s) IntraMuscular once  heparin   Injectable 5000 Unit(s) SubCutaneous every 8 hours  insulin lispro (ADMELOG) corrective regimen sliding scale   SubCutaneous three times a day before meals  levETIRAcetam 500 milliGRAM(s) Oral two times a day    MEDICATIONS  (PRN):  dextrose Oral Gel 15 Gram(s) Oral once PRN Blood Glucose LESS THAN 70 milliGRAM(s)/deciliter  labetalol Injectable 10 milliGRAM(s) IV Push daily PRN Systolic/Diastolic >      FAMILY HISTORY:  FH: hypertension    FH: CAD (coronary artery disease)        Allergies    cefaclor (Unknown)  penicillin (Unknown)  sulfa drugs (Unknown)    Intolerances        SOCIAL HISTORY:    [  ] Smoking  [  ] EtOH  [  ] Substance abuse     Home Environment:  [  ] Alone  [  ] Lives with Family  [  ] Home Health Aide    Dwelling:  [  ] Private House  [  ] Apartment  [ X ] Adult Home/Assisted Living Facility  [  ] Skilled Nursing Facility      [  ] Short Term  [  ] Long Term  [  ] Stairs       Elevator [  ]    FUNCTIONAL STATUS PTA: (Check all that apply)  Ambulation: [ X  ]Independent    [  ] Dependent     [  ] Non-Ambulatory  Assistive Device: [  ] Straight Cane  [  ]  Quad Cane  [ X ] Rolling Walker  [  ]  Wheelchair  ADL: [ X ] Independent  [  ]  Dependent       Vital Signs Last 24 Hrs  T(C): 36.2 (21 Jan 2023 08:00), Max: 36.7 (20 Jan 2023 15:55)  T(F): 97.1 (21 Jan 2023 08:00), Max: 98 (20 Jan 2023 15:55)  HR: 66 (21 Jan 2023 12:00) (55 - 74)  BP: 162/95 (21 Jan 2023 12:00) (134/76 - 162/95)  BP(mean): 122 (21 Jan 2023 12:00) (116 - 122)  RR: 18 (21 Jan 2023 12:00) (17 - 20)  SpO2: 98% (21 Jan 2023 12:00) (96% - 99%)    Parameters below as of 21 Jan 2023 12:00  Patient On (Oxygen Delivery Method): room air          PHYSICAL EXAM: Alert, some memory loss  GENERAL: Well-developed, well-nourished, in NAD  HEAD:  Normocephalic, atraumatic  EYES: EOMI, PERRLA, sclerae anicteric, conjunctivae not injected  NECK: Supple, No JVD, Normal thyroid  CHEST/LUNG: Clear to auscultation bilaterally; No rales, rhonchi, wheezing  HEART: Regular rate and rhythm; No murmurs, gallops, or rubs  ABDOMEN: Soft, nontender, nondistended; Bowel sounds present  EXTREMITIES:  2+ Peripheral pulses; No clubbing, cyanosis, or edema    NERVOUS SYSTEM:  Cranial Nerves II-XII intact [  ] Abnormal  [  ] +masked facies, hypophonic speech  ROM: WFL all extremities [ X ]  Abnormal [  ]  Motor Strength: WFL all extremities  [X  ]  Abnormal [  ]  Sensation: intact to light touch [X  ] Abnormal [  ]  Reflexes: Symmetric [  ]  Abnormal [  ]    FUNCTIONAL STATUS:  Bed Mobility: Independent [  ]  Supervision [ X ]  Needs Assistance [  ]  N/A [  ]  Transfers: Independent [  ]  Supervision [  ]  Needs Assistance [ X ]  N/A [  ]   Ambulation: Independent [  ]  Supervision [  ]  Needs Assistance [X  ]  N/A [  ]  ADLs: Independent [  ] Requires Assistance [X  ] N/A [  ]      LABS:                        15.8   7.88  )-----------( 134      ( 20 Jan 2023 18:33 )             43.5     01-20    140  |  103  |  13  ----------------------------<  103<H>  4.2   |  25  |  1.0    Ca    8.7      20 Jan 2023 18:33    TPro  5.9<L>  /  Alb  3.8  /  TBili  0.8  /  DBili  x   /  AST  13  /  ALT  <5  /  AlkPhos  71  01-20    PT/INR - ( 20 Jan 2023 18:33 )   PT: 12.10 sec;   INR: 1.06 ratio         PTT - ( 20 Jan 2023 18:33 )  PTT:28.9 sec  Urinalysis Basic - ( 20 Jan 2023 20:32 )    Color: Yellow / Appearance: Clear / SG: >1.050 / pH: x  Gluc: x / Ketone: Negative  / Bili: Negative / Urobili: <2 mg/dL   Blood: x / Protein: Trace / Nitrite: Negative   Leuk Esterase: Moderate / RBC: 1 /HPF / WBC 19 /HPF   Sq Epi: x / Non Sq Epi: 0 /HPF / Bacteria: Negative        RADIOLOGY & ADDITIONAL STUDIES:  < from: CT Brain Stroke Protocol (01.20.23 @ 17:31) >    ACC: [id number] EXAM:  CT BRAIN STROKE PROTOCOL   ORDERED BY: CORRINE JOSHI     PROCEDURE DATE:  01/20/2023          INTERPRETATION:  CLINICAL INDICATION : Transient difficulty with   word-finding    TECHNIQUE: CT of the head was performed withoutthe administration of   intravenous contrast.    COMPARISON: CT head 9/22/2022, MRI brain 9/26/2022    FINDINGS:    There is stable prominence of the ventricles and sulci appropriate for   age.    There are stable confluent periventricular white matter hypodensities   consistent with moderate chronic microvascular signal changes. There are   additional stable hypodensities in the right thalamus and left basal   ganglia likely represent chronic lacunar infarcts.    There is no evidence of intraparenchymal hematoma, mass effect, or   midline shift. There is no extra axial fluid collection. There is no   hydrocephalus.    The visualized orbital contents appear normal. The paranasal sinuses and   mastoid air cells are well aerated. Visualized soft tissues and osseous   structures appear normal.    IMPRESSION:    No acute intracranial hemorrhage, midline shift, or mass effect.    Stable moderate chronic microvascular changes and chronic lacunar   infarcts.    Communication: The summary of above findings were discussed with readback   confirmation with Dr. Soto by resident Alhaji Rivas MD on 1/20/2023 at   5:49PM.    --- End of Report ---      < end of copied text >  < from: CT Brain Perfusion Maps Stroke (01.20.23 @ 17:49) >    ACC: [id number] EXAM:  CT ANGIO BRAIN STROKE PROTC IC   ORDERED BY: CORRINE JOSHI     ACC: [id number] EXAM:  CT ANGIO NECK STROKE PROTCL IC   ORDERED BY: CORRINE JOSHI     ACC: [id number] EXAM:  CT BRAIN PERFUSION MAPS STROKE   ORDERED BY: CORRINE JOSHI     PROCEDURE DATE:  01/20/2023          INTERPRETATION:  CLINICAL INDICATION: Stroke alert, transient difficulty   with word finding    TECHNIQUE: CT perfusion of the brain was performed following the bolus   administration of intravenous contrast. Source images were postprocessed   on an independent workstation under direct physician supervision and   archived to the PACS. CTA of the head and neck was performed after the   intravenous administration of of contrast. 3-D reconstructions were   performed under physician supervision on a separate workstation and   reviewed. A total of 50 mL Omnipaque 350 nonionic IV contrast was   administered.    NASCET CRITERIA FOR CAROTID STENOSIS:  Mild: 0% to 49%, Moderate: 50% to 69%, Severe: 70% to 99%, Complete   Occlusion    COMPARISON: None available.    FINDINGS:    CT PERFUSION:  No perfusion deficits to suggest areas of completed infarction or at risk   territory.    The cerebral blood volume and time to peak images demonstrate no   significant evidence of fixed or mismatched focal perfusion deficit or   surrounding ischemic penumbra to suggest acute cerebral ischemia or   infarct.    CTA HEAD/NECK:    AORTIC ARCH: There are mild calcific atherosclerotic plaques without   flow-limiting stenosis.    RIGHT ANTERIOR CIRCULATION:  The common carotid artery (CCA) is patent up to the bulb without   stenosis. There are mild calcific atherosclerotic plaques in the carotid   bulb extending into the proximal cervical ICA without stenosis.There is   a mild calcific atherosclerotic plaque in the proximal external carotid   artery (ECA) without flow-limiting stenosis. The cervical internal   carotid artery (ICA) is patent. There is mild calcific/noncalcific   atherosclerotic plaque in the carotid siphon contributing to mild   stenosis in the supraclinoid ICA.    The anterior and middle cerebral arteries (FITO/MCA) are patent without   stenosis.      LEFT ANTERIOR CIRCULATION:  The CCA is patent up to the bulb without stenosis. There are mild   calcific/noncalcific atherosclerotic plaques in the carotid bulb without   flow-limiting stenosis. The ECA and its proximal branches are patent   without stenosis. The cervical ICA is patent without stenosis.. There are   mild calcific atherosclerotic plaques in the carotid siphon contributing   to mild stenosis in the supraclinoid ICA.    The anterior and middle cerebral arteries are patent without stenosis.      POSTERIOR CIRCULATION:  There is a focal moderate stenosis in the V1 segment of the right   vertebral artery. There are additional calcific and noncalcific   atherosclerotic plaques in the bilateral V4 segments of the vertebral   arteries contributing to moderate stenosis in the right and severe focal   stenosis in the left. The basilar artery is patent without stenosis. The   proximal branch vasculature of the posterior circulation are within   normal limits.    The right posterior cerebral artery is patent. There is high-grade focal   stenosis in the inferior branch of the P4 segment of the left PCA.    There is no evidence for saccular aneurysm, vascular malformation, or   large vessel occlusion.    The visualized lung apices are unremarkable.    OTHERS:  Please see separately dictated CT head for the intracranial findings.      IMPRESSION:    CT PERFUSION:  No perfusion deficits to suggest areas of completed infarction or at risk   territory.    CTA HEAD/NECK:  No large vessel occlusion, aneurysm, or vascular malformation.    Mild atherosclerotic stenosis in the bilateral supraclinoid ICAs.    Severe left/moderate right atherosclerotic stenosis in the V4 segments of   the bilateral vertebral arteries. Moderate focal stenosis in the V1   segment of the right vertebral artery.    Focal high-grade stenosisin the inferior branch of the left P4 PCA.        --- End of Report ---      < end of copied text >  < from: Xray Chest 1 View AP/PA (01.20.23 @ 20:01) >    ACC: [id number] EXAM:  XR CHEST 1 VIEW   ORDERED BY: CORRINE JOSHI     PROCEDURE DATE:  01/20/2023          INTERPRETATION:  Clinical History/Reason for Exam:  Stroke Code    Technique:  Frontal view the chest.    Comparison: Chest x-ray 9/22/2022.    Findings:    Support devices:  none    Cardiac/mediastinum/hilum: Unremarkable    Lung parenchyma/ Pleura: No focal parenchymal opacities, effusion or   pneumothorax is present.      Skeleton/soft tissues: No focal skeletal lesions are identified.      Impression:    No radiographic evidence of cardiopulmonary disease.    --- End of Report ---      < end of copied text >

## 2023-01-21 NOTE — OCCUPATIONAL THERAPY INITIAL EVALUATION ADULT - NSOTDISCHREC_GEN_A_CORE
will make recommendations once assessment complete home vs rehab facility pending further assessment

## 2023-01-21 NOTE — OCCUPATIONAL THERAPY INITIAL EVALUATION ADULT - LEVEL OF INDEPENDENCE: BED TO CHAIR, REHAB EVAL
unable to assess oob 2* pt SBP outside of parameters, per MD hold oob activity/unable to perform pt declined to perform

## 2023-01-21 NOTE — OCCUPATIONAL THERAPY INITIAL EVALUATION ADULT - GENERAL OBSERVATIONS, REHAB EVAL
Pt encountered and left semi reclined in bed. NAD. Agreeable to OT IE. + tele, +IV locked. RN aware. Call bell in reach

## 2023-01-21 NOTE — CONSULT NOTE ADULT - ASSESSMENT
IMPRESSION: Rehabilitation for altered mental state, r/o CVA, Parkinson disease    PRECAUTIONS: [  ] Cardiac  [  ] Respiratory  [ X ] Seizures [  ] Contact Precautions  [  ] Droplet Isolation  [  ] Other:      Weight Bearing Status:  WBAT    RECOMMENDATION:    Out of bed to chair     DVT/decubitus ulcer prophylaxis    REHABILITATION PLAN:     [  X ] Bedside PT 3-5 times a week   [  X ]   Bedside OT  2-3 times a week             [   ] No Rehab Therapy Indicated                   [   ]  Speech Therapy   Conditioning/ROM                                    ADL  Bed Mobility                                               Conditioning/ROM  Transfers                                                     Bed Mobility  Sitting /Standing Balance                         Transfers                                        Gait Training                                               Sitting/Standing Balance  Stair Training  [   ] Applicable                    Home Equipment Evaluation                                                                        Splinting  [   ] Only      GOALS:   ADL   [ X  ]   Independent                    Transfers  [ X  ] Independent                          Ambulation  [ X  ] Independent     [  X  ] With device                            [   ]  CG                                                         [   ]  CG                                                                  [   ] CG                            [    ] Min A                                                   [   ] Min A                                                              [   ] Min  A          DISCHARGE PLAN:  [    ]  Good candidate for Intensive Rehabilitation/Hospital-based 4A [facility]                                             Will tolerate 3 hours of Intensive Rehab Daily                                       [  X  ]  Short Term Rehabilitation in Skilled Nursing Facility                                       [  X  ]  Home with Outpatient or  services                                         [    ]  Possible Candidate for Intensive Hospital-Based Rehabilitation      Thank you. IMPRESSION: Rehabilitation for aphasia, cognitive deficts, r/o CVA, Parkinson disease    PRECAUTIONS: [  ] Cardiac  [  ] Respiratory  [ X ] Seizures [  ] Contact Precautions  [  ] Droplet Isolation  [  ] Other:      Weight Bearing Status:  WBAT    RECOMMENDATION:    Out of bed to chair     DVT/decubitus ulcer prophylaxis    REHABILITATION PLAN:     [  X ] Bedside PT 3-5 times a week   [  X ]   Bedside OT  2-3 times a week             [   ] No Rehab Therapy Indicated                   [  X ]  Speech Therapy --Seen  Conditioning/ROM                                    ADL  Bed Mobility                                               Conditioning/ROM  Transfers                                                     Bed Mobility  Sitting /Standing Balance                         Transfers                                        Gait Training                                               Sitting/Standing Balance  Stair Training  [   ] Applicable                    Home Equipment Evaluation                                                                        Splinting  [   ] Only      GOALS:   ADL   [ X  ]   Independent                    Transfers  [ X  ] Independent                          Ambulation  [ X  ] Independent     [  X  ] With device                            [   ]  CG                                                         [   ]  CG                                                                  [   ] CG                            [    ] Min A                                                   [   ] Min A                                                              [   ] Min  A          DISCHARGE PLAN:  [    ]  Good candidate for Intensive Rehabilitation/Hospital-based 4A [facility]                                             Will tolerate 3 hours of Intensive Rehab Daily                                       [  X  ]  Short Term Rehabilitation in Skilled Nursing Facility                                       [  X  ]  Home with Outpatient or VN services                                         [    ]  Possible Candidate for Intensive Hospital-Based Rehabilitation      Thank you.

## 2023-01-22 ENCOUNTER — TRANSCRIPTION ENCOUNTER (OUTPATIENT)
Age: 73
End: 2023-01-22

## 2023-01-22 VITALS
HEART RATE: 62 BPM | OXYGEN SATURATION: 96 % | RESPIRATION RATE: 18 BRPM | TEMPERATURE: 97 F | DIASTOLIC BLOOD PRESSURE: 84 MMHG | SYSTOLIC BLOOD PRESSURE: 155 MMHG

## 2023-01-22 LAB
CULTURE RESULTS: SIGNIFICANT CHANGE UP
GLUCOSE BLDC GLUCOMTR-MCNC: 126 MG/DL — HIGH (ref 70–99)
GLUCOSE BLDC GLUCOMTR-MCNC: 147 MG/DL — HIGH (ref 70–99)
GLUCOSE BLDC GLUCOMTR-MCNC: 78 MG/DL — SIGNIFICANT CHANGE UP (ref 70–99)
SPECIMEN SOURCE: SIGNIFICANT CHANGE UP

## 2023-01-22 PROCEDURE — 95819 EEG AWAKE AND ASLEEP: CPT | Mod: 26

## 2023-01-22 PROCEDURE — 99233 SBSQ HOSP IP/OBS HIGH 50: CPT

## 2023-01-22 PROCEDURE — 99232 SBSQ HOSP IP/OBS MODERATE 35: CPT

## 2023-01-22 RX ORDER — ATORVASTATIN CALCIUM 80 MG/1
1 TABLET, FILM COATED ORAL
Qty: 90 | Refills: 0
Start: 2023-01-22 | End: 2023-04-21

## 2023-01-22 RX ORDER — CLOPIDOGREL BISULFATE 75 MG/1
1 TABLET, FILM COATED ORAL
Qty: 20 | Refills: 0
Start: 2023-01-22 | End: 2023-02-10

## 2023-01-22 RX ORDER — ATORVASTATIN CALCIUM 80 MG/1
1 TABLET, FILM COATED ORAL
Qty: 0 | Refills: 0 | DISCHARGE
Start: 2023-01-22

## 2023-01-22 RX ADMIN — ENTACAPONE 200 MILLIGRAM(S): 200 TABLET, FILM COATED ORAL at 05:26

## 2023-01-22 RX ADMIN — LEVETIRACETAM 500 MILLIGRAM(S): 250 TABLET, FILM COATED ORAL at 05:26

## 2023-01-22 RX ADMIN — CARBIDOPA AND LEVODOPA 2 TABLET(S): 25; 100 TABLET ORAL at 14:49

## 2023-01-22 RX ADMIN — HEPARIN SODIUM 5000 UNIT(S): 5000 INJECTION INTRAVENOUS; SUBCUTANEOUS at 14:48

## 2023-01-22 RX ADMIN — HEPARIN SODIUM 5000 UNIT(S): 5000 INJECTION INTRAVENOUS; SUBCUTANEOUS at 05:26

## 2023-01-22 RX ADMIN — ENTACAPONE 200 MILLIGRAM(S): 200 TABLET, FILM COATED ORAL at 02:01

## 2023-01-22 RX ADMIN — CLOPIDOGREL BISULFATE 75 MILLIGRAM(S): 75 TABLET, FILM COATED ORAL at 12:36

## 2023-01-22 RX ADMIN — LEVETIRACETAM 500 MILLIGRAM(S): 250 TABLET, FILM COATED ORAL at 17:21

## 2023-01-22 RX ADMIN — Medication 81 MILLIGRAM(S): at 12:36

## 2023-01-22 RX ADMIN — ENTACAPONE 200 MILLIGRAM(S): 200 TABLET, FILM COATED ORAL at 12:36

## 2023-01-22 RX ADMIN — CARBIDOPA AND LEVODOPA 2 TABLET(S): 25; 100 TABLET ORAL at 05:26

## 2023-01-22 RX ADMIN — ENTACAPONE 200 MILLIGRAM(S): 200 TABLET, FILM COATED ORAL at 17:21

## 2023-01-22 NOTE — DISCHARGE NOTE NURSING/CASE MANAGEMENT/SOCIAL WORK - PATIENT PORTAL LINK FT
You can access the FollowMyHealth Patient Portal offered by [facility] by registering at the following website: [email or web address]. By joining [email or web address]’s FollowMyHealth portal, you will also be able to view your health information using other applications (apps) compatible with our system.

## 2023-01-22 NOTE — PROGRESS NOTE ADULT - SUBJECTIVE AND OBJECTIVE BOX
SUBJECTIVE:    Patient is a 72y old Male who presents with a chief complaint of word finding difficulty (20 Jan 2023 21:29)    Currently admitted to medicine with the primary diagnosis of Slurred speech       Today is hospital day 1d. This morning he is resting comfortably in bed and reports no new issues or overnight events.     PAST MEDICAL & SURGICAL HISTORY  Parkinson disease    CVA (cerebral vascular accident)    HTN (hypertension)    Diabetes mellitus    Gout    No significant past surgical history      SOCIAL HISTORY:  Negative for smoking/alcohol/drug use.     ALLERGIES:  cefaclor (Unknown)  penicillin (Unknown)  sulfa drugs (Unknown)    MEDICATIONS:  STANDING MEDICATIONS  aspirin enteric coated 81 milliGRAM(s) Oral daily  atorvastatin 80 milliGRAM(s) Oral at bedtime  carbidopa/levodopa  25/100 2 Tablet(s) Oral three times a day  clopidogrel Tablet 75 milliGRAM(s) Oral daily  dextrose 5%. 1000 milliLiter(s) IV Continuous <Continuous>  dextrose 5%. 1000 milliLiter(s) IV Continuous <Continuous>  dextrose 50% Injectable 25 Gram(s) IV Push once  dextrose 50% Injectable 12.5 Gram(s) IV Push once  dextrose 50% Injectable 25 Gram(s) IV Push once  entacapone 200 milliGRAM(s) Oral four times a day  glucagon  Injectable 1 milliGRAM(s) IntraMuscular once  heparin   Injectable 5000 Unit(s) SubCutaneous every 8 hours  insulin lispro (ADMELOG) corrective regimen sliding scale   SubCutaneous three times a day before meals  levETIRAcetam 500 milliGRAM(s) Oral two times a day    PRN MEDICATIONS  dextrose Oral Gel 15 Gram(s) Oral once PRN  labetalol Injectable 10 milliGRAM(s) IV Push daily PRN    VITALS:   T(F): 97.1  HR: 66  BP: 162/95  RR: 18  SpO2: 98%    PHYSICAL EXAM:  GEN: No acute distress  LUNGS: Clear to auscultation bilaterally   HEART: S1/S2 present.    ABD: Soft, non-tender, non-distended. Bowel sounds present         LABS:                        15.8   7.88  )-----------( 134      ( 20 Jan 2023 18:33 )             43.5     01-20    140  |  103  |  13  ----------------------------<  103<H>  4.2   |  25  |  1.0    Ca    8.7      20 Jan 2023 18:33    TPro  5.9<L>  /  Alb  3.8  /  TBili  0.8  /  DBili  x   /  AST  13  /  ALT  <5  /  AlkPhos  71  01-20    PT/INR - ( 20 Jan 2023 18:33 )   PT: 12.10 sec;   INR: 1.06 ratio         PTT - ( 20 Jan 2023 18:33 )  PTT:28.9 sec  Urinalysis Basic - ( 20 Jan 2023 20:32 )    Color: Yellow / Appearance: Clear / SG: >1.050 / pH: x  Gluc: x / Ketone: Negative  / Bili: Negative / Urobili: <2 mg/dL   Blood: x / Protein: Trace / Nitrite: Negative   Leuk Esterase: Moderate / RBC: 1 /HPF / WBC 19 /HPF   Sq Epi: x / Non Sq Epi: 0 /HPF / Bacteria: Negative          Lactate, Blood: 3.0 mmol/L *H* (01-20-23 @ 18:33)  Troponin T, Serum: <0.01 ng/mL (01-20-23 @ 18:33)      CARDIAC MARKERS ( 20 Jan 2023 18:33 )  x     / <0.01 ng/mL / x     / x     / x            RADIOLOGY:      
NEUROLOGY CONSULT PROGRESS NOTE    INTERVAL HISTORY: No events overnight    REVIEW OF SYSTEMS:  As per HPI, otherwise negative for Constitutional, Eyes, Ears/Nose/Mouth/Throat, Neck, Cardiovascular, Respiratory, Gastrointestinal, Genitourinary, Skin, Endocrine, Musculoskeletal, Psychiatric, and Hematologic/Lymphatic.    MEDICATIONS:  aspirin enteric coated 81 milliGRAM(s) Oral daily  atorvastatin 80 milliGRAM(s) Oral at bedtime  carbidopa/levodopa  25/100 2 Tablet(s) Oral three times a day  clopidogrel Tablet 75 milliGRAM(s) Oral daily  dextrose 5%. 1000 milliLiter(s) IV Continuous <Continuous>  dextrose 5%. 1000 milliLiter(s) IV Continuous <Continuous>  dextrose 50% Injectable 25 Gram(s) IV Push once  dextrose 50% Injectable 12.5 Gram(s) IV Push once  dextrose 50% Injectable 25 Gram(s) IV Push once  dextrose Oral Gel 15 Gram(s) Oral once PRN  entacapone 200 milliGRAM(s) Oral four times a day  glucagon  Injectable 1 milliGRAM(s) IntraMuscular once  heparin   Injectable 5000 Unit(s) SubCutaneous every 8 hours  insulin lispro (ADMELOG) corrective regimen sliding scale   SubCutaneous three times a day before meals  labetalol Injectable 10 milliGRAM(s) IV Push daily PRN  levETIRAcetam 500 milliGRAM(s) Oral two times a day    VITAL SIGNS:  Vital Signs Last 24 Hrs  T(C): 36.2 (22 Jan 2023 04:00), Max: 36.5 (21 Jan 2023 20:00)  T(F): 97.1 (22 Jan 2023 04:00), Max: 97.7 (21 Jan 2023 20:00)  HR: 50 (22 Jan 2023 04:00) (50 - 66)  BP: 167/89 (22 Jan 2023 04:00) (145/94 - 167/101)  BP(mean): 115 (22 Jan 2023 04:00) (100 - 127)  RR: 17 (22 Jan 2023 04:00) (16 - 18)  SpO2: 96% (22 Jan 2023 04:00) (96% - 99%)    Parameters below as of 22 Jan 2023 04:00  Patient On (Oxygen Delivery Method): room air        PHYSICAL EXAMINATION:  Physical exam:  Constitutional: No acute distress, conversant  Eyes: Anicteric sclerae, moist conjunctivae, see below for CNs  Neck: trachea midline, FROM, supple,   Cardiovascular: Regular rate and rhythm,   Pulmonary: Anterior breath sounds clear bilaterally,   GI: Abdomen soft, non-distended, non-tender      Neurologic:  -Mental status: Awake, alert, oriented to person, place, but not time. Speech is mostly fluent with some difficulty with naming (5/6), but intact  repetition, and comprehension, no dysarthria Follows commands. Attention/concentration intact.  -Cranial nerves:   II: Visual fields are full to confrontation.  III, IV, VI: Extraocular movements are intact without nystagmus. Pupils equally round and reactive to light  V:  Facial sensation V1-V3 equal and intact   VII: Face is symmetric with normal eye closure and smile  VIII: Hearing is bilaterally intact to finger rub  IX, X: Uvula is midline and soft palate rises symmetrically  XI: Head turning and shoulder shrug are intact.  XII: Tongue protrudes midline  Motor: Normal bulk and tone. No pronator drift. Strength bilateral upper extremity 5/5, bilateral lower extremities 5/5.  Rapid alternating movements intact and symmetric  Sensation: Intact to light touch bilaterally. No neglect or extinction on double simultaneous testing.  Coordination: No dysmetria on finger-to-nose and heel-to-shin bilaterally  Reflexes: Downgoing toes bilaterally       NIHSS:2    LABS:                          15.8   7.88  )-----------( 134      ( 20 Jan 2023 18:33 )             43.5     01-20    140  |  103  |  13  ----------------------------<  103<H>  4.2   |  25  |  1.0    Ca    8.7      20 Jan 2023 18:33    TPro  5.9<L>  /  Alb  3.8  /  TBili  0.8  /  DBili  x   /  AST  13  /  ALT  <5  /  AlkPhos  71  01-20    PT/INR - ( 20 Jan 2023 18:33 )   PT: 12.10 sec;   INR: 1.06 ratio         PTT - ( 20 Jan 2023 18:33 )  PTT:28.9 sec  Urinalysis Basic - ( 20 Jan 2023 20:32 )    Color: Yellow / Appearance: Clear / SG: >1.050 / pH: x  Gluc: x / Ketone: Negative  / Bili: Negative / Urobili: <2 mg/dL   Blood: x / Protein: Trace / Nitrite: Negative   Leuk Esterase: Moderate / RBC: 1 /HPF / WBC 19 /HPF   Sq Epi: x / Non Sq Epi: 0 /HPF / Bacteria: Negative        RADIOLOGY & ADDITIONAL STUDIES:      IMPRESSION & RECOMMENDATIONS:  
SUBJECTIVE:    Patient is a 72y old Male who presents with a chief complaint of word finding difficulty (22 Jan 2023 05:23)    Currently admitted to medicine with the primary diagnosis of Slurred speech       Today is hospital day 2d. This morning he is resting comfortably in bed and reports no new issues or overnight events.     PAST MEDICAL & SURGICAL HISTORY  Parkinson disease    CVA (cerebral vascular accident)    HTN (hypertension)    Diabetes mellitus    Gout    No significant past surgical history      SOCIAL HISTORY:  Negative for smoking/alcohol/drug use.     ALLERGIES:  cefaclor (Unknown)  penicillin (Unknown)  sulfa drugs (Unknown)    MEDICATIONS:  STANDING MEDICATIONS  aspirin enteric coated 81 milliGRAM(s) Oral daily  atorvastatin 80 milliGRAM(s) Oral at bedtime  carbidopa/levodopa  25/100 2 Tablet(s) Oral three times a day  clopidogrel Tablet 75 milliGRAM(s) Oral daily  dextrose 5%. 1000 milliLiter(s) IV Continuous <Continuous>  dextrose 5%. 1000 milliLiter(s) IV Continuous <Continuous>  dextrose 50% Injectable 25 Gram(s) IV Push once  dextrose 50% Injectable 12.5 Gram(s) IV Push once  dextrose 50% Injectable 25 Gram(s) IV Push once  entacapone 200 milliGRAM(s) Oral four times a day  glucagon  Injectable 1 milliGRAM(s) IntraMuscular once  heparin   Injectable 5000 Unit(s) SubCutaneous every 8 hours  insulin lispro (ADMELOG) corrective regimen sliding scale   SubCutaneous three times a day before meals  levETIRAcetam 500 milliGRAM(s) Oral two times a day    PRN MEDICATIONS  dextrose Oral Gel 15 Gram(s) Oral once PRN  labetalol Injectable 10 milliGRAM(s) IV Push daily PRN    VITALS:   T(F): 97.6  HR: 55  BP: 161/91  RR: 17  SpO2: 97%    PHYSICAL EXAM:  GEN: No acute distress  LUNGS: Clear to auscultation bilaterally   HEART: S1/S2 present. .   ABD: Soft, non-tender, non-distended. Bowel sounds present         LABS:                        15.8   7.88  )-----------( 134      ( 20 Jan 2023 18:33 )             43.5     01-20    140  |  103  |  13  ----------------------------<  103<H>  4.2   |  25  |  1.0    Ca    8.7      20 Jan 2023 18:33    TPro  5.9<L>  /  Alb  3.8  /  TBili  0.8  /  DBili  x   /  AST  13  /  ALT  <5  /  AlkPhos  71  01-20    PT/INR - ( 20 Jan 2023 18:33 )   PT: 12.10 sec;   INR: 1.06 ratio         PTT - ( 20 Jan 2023 18:33 )  PTT:28.9 sec  Urinalysis Basic - ( 20 Jan 2023 20:32 )    Color: Yellow / Appearance: Clear / SG: >1.050 / pH: x  Gluc: x / Ketone: Negative  / Bili: Negative / Urobili: <2 mg/dL   Blood: x / Protein: Trace / Nitrite: Negative   Leuk Esterase: Moderate / RBC: 1 /HPF / WBC 19 /HPF   Sq Epi: x / Non Sq Epi: 0 /HPF / Bacteria: Negative              Culture - Urine (collected 20 Jan 2023 20:32)  Source: Clean Catch Clean Catch (Midstream)  Preliminary Report (22 Jan 2023 09:35):    10,000 - 49,000 CFU/mL Gram positive organisms    <10,000 CFU/ml Normal Urogenital david present      CARDIAC MARKERS ( 20 Jan 2023 18:33 )  x     / <0.01 ng/mL / x     / x     / x            RADIOLOGY:

## 2023-01-22 NOTE — DISCHARGE NOTE PROVIDER - NSDCMRMEDTOKEN_GEN_ALL_CORE_FT
aspirin 81 mg oral delayed release tablet: 1 tab(s) orally once a day  atorvastatin 40 mg oral tablet: 1 tab(s) orally once a day  carbidopa-levodopa 25 mg-100 mg oral tablet: 2 tab(s) orally 3 times a day  carvedilol 12.5 mg oral tablet: 1 tab(s) orally every 12 hours  entacapone 200 mg oral tablet: 1 tab(s) orally 4 times a day with sinemet  levETIRAcetam 500 mg oral tablet: 1 tab(s) orally 2 times a day  lisinopril 40 mg oral tablet: 1 tab(s) orally once a day  Lunesta 1 mg oral tablet: 1 tab(s) orally once a day (at bedtime)  Melatonin 10 mg oral capsule: 1 cap(s) orally once a day (at bedtime)  metFORMIN 500 mg oral tablet, extended release: 1 tab(s) orally once a day (at bedtime)  rivastigmine 9.5 mg/24 hr transdermal film, extended release: 1 patch transdermal every 24 hours  traZODone 100 mg oral tablet: 1 tab(s) orally once a day

## 2023-01-22 NOTE — PHYSICAL THERAPY INITIAL EVALUATION ADULT - PERTINENT HX OF CURRENT PROBLEM, REHAB EVAL
Pt is a R handed 72 years old male with PMH of Stroke, H/O seizure, Parkinson's disease, DM presented to ED from Assisted living facility when he was found to have confusion and unable to speak. He was seen alst well around 1-2 pm by the  who gave him medication. Around 3:30 pm, the facility received a call from patient's family that he/she was talking to him over phone and he was not making sense. 911 was called, and he was sent to ED. By that time , he started to feel better. as per patient, the last thing he remembered was watching tv and making the phone call but he was not able to remember what happened exactly after that. But now he is feeling better. Denies any headache, blurry vision, slurring of speech, weakness, dizziness. Denies any fall or head trauma. Spoke to Facility, no witnessed seizure like activity.   Stroke code was activated, NIHSS:2. Imaging was obtained: no acute abnormalities noted. Patient is being admitted to Stroke unit for further workup.

## 2023-01-22 NOTE — DISCHARGE NOTE NURSING/CASE MANAGEMENT/SOCIAL WORK - NSDCVIVACCINE_GEN_ALL_CORE_FT
Tdap; 12-May-2020 22:10; Angelina Gomez (RN); [employer]; [id number] (Exp. Date: 19-Apr-2021); IntraMuscular; Deltoid Right.; 0.5 milliLiter(s); VIS (VIS Published: 09-May-2013, VIS Presented: 12-May-2020);

## 2023-01-22 NOTE — DISCHARGE NOTE PROVIDER - NSDCQMERRANDS_GEN_ALL_CORE
Patient with weight down 2 pounds from last visit stable symptoms chronic mild lower extremity edema continue the current dose of Lasix.  Counseled patient on keeping a daily weight log as well as low-sodium diet   No

## 2023-01-22 NOTE — DISCHARGE NOTE PROVIDER - NSDCCPCAREPLAN_GEN_ALL_CORE_FT
PRINCIPAL DISCHARGE DIAGNOSIS  Diagnosis: Hypertensive encephalopathy  Assessment and Plan of Treatment:       SECONDARY DISCHARGE DIAGNOSES  Diagnosis: Parkinson disease  Assessment and Plan of Treatment:     Diagnosis: Dementia  Assessment and Plan of Treatment:     Diagnosis: HTN (hypertension)  Assessment and Plan of Treatment: Hypertension is the medical term for high blood pressure. Blood pressure refers to the pressure that blood applies to the inner walls of the arteries. Arteries carry blood from the heart to other organs and parts of the body. Untreated high blood pressure increases the strain on the heart and arteries, eventually causing organ damage. High blood pressure increases the risk of heart failure, heart attack (myocardial infarction), stroke, and kidney failure. High blood pressure does not usually cause any symptoms. Treatment of hypertension usually begins with lifestyle changes. Making these lifestyle changes involves little or no risk. Recommended changes often include reducing the amount of salt in your diet, losing weight if you are overweight or obese, avoiding drinking too much alcohol, stopping smoking and exercising at least 30 minutes per day most days of the week. If you are prescribed medication for your hypertension it is important to take these as prescribed to prevent the possible complications of uncontrolled hypertension.      Diagnosis: DM (diabetes mellitus)  Assessment and Plan of Treatment: Type 2 diabetes (sometimes called type 2 "diabetes mellitus") is a disorder that disrupts the way your body uses sugar.  All the cells in your body need sugar to work normally. Sugar gets into the cells with the help of a hormone called insulin. If there is not enough insulin, or if the body stops responding to insulin, sugar builds up in the blood. That is what happens to people with diabetes.  There are 2 different types of diabetes. You have Type 2 diabetes, where the body's cells do not respond to insulin, the body does not make enough insulin, or both.  Type 2 diabetes usually causes no symptoms at first. When symptoms do occur, they include: needing to urinate often, intense thirst and blurry vision  Even though type 2 diabetes might not make you feel sick, it can cause serious problems over time, if it is not treated. The disorder can lead to heart attacks, strokes, kidney disease, vision problems (or even blindness), pain or loss of feeling in the hands and feet, the need to have fingers, toes, or other body parts removed (amputated).   There are a few medicines that help control blood sugar. Some people need to take pills that help the body make more insulin or that help insulin do its job. Others need insulin shots.  Depending on what medicines you take, you might need to check your blood sugar regularly at home. But not everyone with type 2 diabetes needs to do this. Your doctor or nurse will tell you if you should be checking your blood sugar, and when and how to do this.  Sometimes, people with type 2 diabetes also need medicines to reduce the problems caused by the disease. For instance, medicines used to lower blood pressure can reduce the chances of a heart attack or stroke.  Medicines are not the only tool to manage diabetes. Being active, losing weight, eating right, and not smoking can all help people with diabetes stay as healthy as possible. It's also important to get the flu vaccine every year. Some people also need a vaccine to prevent pneumonia, too.    Diagnosis: Seizures  Assessment and Plan of Treatment: Diagnosis is not clear, and it was made by his neurologist

## 2023-01-22 NOTE — PROGRESS NOTE ADULT - ASSESSMENT
Mr. Ambriz is a 72 years old male with PMH of Stroke, Seizure, DM, PD BIBEMS for sudden onset confusion and work finding difficulty. NIHSS : 2, iamging didn't reveal any abnormalities. Patient is being admitted to stroke unit for further workup. Differentials include stroke vs TIA vs Seizure    Neuro  #R/O stroke  #H/O Seizure  #parkinson's Disease  - s/p  mg and Palvix 300 mg stat  - continue aspirin 81mg and plavix 75mg daily  - started atorvastatin 80mg daily  - q4hr stroke neuro checks and vitals  - obtain MRI Brain without contrast  - Stroke education  - continue Keppra 500 mg BID  - Consider EEG  - Continue his home PD meds: Sinemet 25/100: 2 tab TID, Entacapone 1 tab 4 times a day ( Rivastigmine patch is non formulary)   Chronic Infarct on MRI        #HTN  - permissive hypertension, Goal -180  - hold home blood pressure medication for now ( was on Lisinopril)  - obtain TTE   - Stroke Code EKG Results: pending official read    #HLD  - high dose statin as above in CVA  - LDL results: pending       GI  #Nutrition/Fluids/Electrolytes   - replete K<4 and Mg <2  - Diet: NPO for now  - IVF: NaCl bolus    Renal  - daily BMP    Infectious Disease  - Stroke Code CXR results: clear    Endocrine  #DM  - A1C results: pending  - ISS: low dose    - TSH results: pending    DVT Prophylaxis  - heparin sq for DVT prophylaxis   - SCDs for DVT prophylaxis         Cecilia Poole MD  Attending Hospitalist   
Mr. Ambriz is a 72 years old male with PMH of Stroke, Seizure, DM, PD BIBEMS for sudden onset confusion and work finding difficulty. NIHSS : 2, iamging didn't reveal any abnormalities. Patient is being admitted to stroke unit for further workup. Differentials include stroke vs TIA vs Seizure    Neuro  #R/O stroke  #H/O Seizure  #parkinson's Disease  - s/p  mg and Palvix 300 mg stat  - continue aspirin 81mg and plavix 75mg daily  - started atorvastatin 80mg daily  - q4hr stroke neuro checks and vitals  - obtain MRI Brain without contrast  - Stroke education  - continue Keppra 500 mg BID  - Consider EEG  - Continue his home PD meds: Sinemet 25/100: 2 tab TID, Entacapone 1 tab 4 times a day ( Rivastigmine patch is non formulary)  - Holding his sleep meds for now       #HTN  - permissive hypertension, Goal -180  - hold home blood pressure medication for now ( was on Lisinopril)  - obtain TTE   - Stroke Code EKG Results: pending official read    #HLD  - high dose statin as above in CVA  - LDL results: pending       GI  #Nutrition/Fluids/Electrolytes   - replete K<4 and Mg <2  - Diet: NPO for now  - IVF: NaCl bolus    Renal  - daily BMP    Infectious Disease  - Stroke Code CXR results: clear    Endocrine  #DM  - A1C results: pending  - ISS: low dose    - TSH results: pending    DVT Prophylaxis  - heparin sq for DVT prophylaxis   - SCDs for DVT prophylaxis         Cecilia Poole MD  Attending Hospitalist   
Mr. Ambriz is a 72 years old male with PMH of Stroke, Seizure, DM, PD BIBEMS for sudden onset confusion and work finding difficulty. NIHSS : 2, iamging didn't reveal any abnormalities. Patient is being admitted to stroke unit for further workup. Differentials include stroke vs TIA vs Seizure    Neuro  #R/O stroke  #H/O Seizure  #parkinson's Disease  - s/p  mg and Palvix 300 mg stat  - continue aspirin 81mg and plavix 75mg daily  - started atorvastatin 80mg daily  - q4hr stroke neuro checks and vitals  - obtain MRI Brain without contrast  - Stroke education  - continue Keppra 500 mg BID  - Consider EEG  - Continue his home PD meds: Sinemet 25/100: 2 tab TID, Entacapone 1 tab 4 times a day ( Rivastigmine patch is non formulary)  - Holding his sleep meds for now       #HTN  - permissive hypertension, Goal -180  - hold home blood pressure medication for now ( was on Lisinopril)  - obtain TTE   - Stroke Code EKG Results: pending official read    #HLD  - high dose statin as above in CVA  - LDL results: pending       GI  #Nutrition/Fluids/Electrolytes   - replete K<4 and Mg <2  - Diet: NPO for now  - IVF: NaCl bolus    Renal  - daily BMP    Infectious Disease  - Stroke Code CXR results: clear    Endocrine  #DM  - A1C results: pending  - ISS: low dose    - TSH results: pending    DVT Prophylaxis  - heparin sq for DVT prophylaxis   - SCDs for DVT prophylaxis         Cecilia Poole MD  Attending Hospitalist

## 2023-01-22 NOTE — DISCHARGE NOTE PROVIDER - HOSPITAL COURSE
This 72y Male, presented to the hospital with episode of confusion     Problem List/Main Diagnoses (system-based): Parkinson disease, dementia, Seizures?, who presented with episode of confusion.     Inpatient treatment course: The patient was stable during his stay in the hospital, with no episodes of confusion.   MR was done and showed no acute findings. Etiology is not clear. The patient had similar episodes in the past and multiple EEGs and MR were done without acute findings or epileptiform discharges. In 2020 had an EEG with general slowing and he was started on Keppra, which he is still taking. The current event could be a seizure. One possibility is hypertensive encephalopathy, but his elevated BP could be related to him being anxious. It is less likely to be a stroke or TIA.      New medications: Non         On the day of discharge, the patient was seen and examined. Symptoms improved. Vital signs are stable. Labs and imaging reviewed. Patient is medically optimized and hemodynamically stable. Return precautions discussed, medication teach back done, and importance of physician followup emphasized. The patient verbalized understanding.

## 2023-01-22 NOTE — DISCHARGE NOTE NURSING/CASE MANAGEMENT/SOCIAL WORK - NSDCPEFALRISK_GEN_ALL_CORE
For information on Fall & Injury Prevention, visit: [email or web address] OR  [email or web address] OR  https://www.cdc.gov/steadi/patient.html

## 2023-01-22 NOTE — DISCHARGE NOTE PROVIDER - CARE PROVIDER_API CALL
Tom Ellis)  EEGEpilepsy; Neurology  [address] [place] [address], [address]  [place] [address]  Phone: [phone]  Fax: [phone]  Follow Up Time: 2 weeks

## 2023-01-22 NOTE — PHYSICAL THERAPY INITIAL EVALUATION ADULT - SPECIFY REASON(S)
Pt to eat breakfast first, will f/u for PT. Pt able to transfer bed to chair with contact guard assist. Pt educated on goals for PT.

## 2023-01-26 DIAGNOSIS — Z86.73 PERSONAL HISTORY OF TRANSIENT ISCHEMIC ATTACK (TIA), AND CEREBRAL INFARCTION WITHOUT RESIDUAL DEFICITS: ICD-10-CM

## 2023-01-26 DIAGNOSIS — I25.10 ATHEROSCLEROTIC HEART DISEASE OF NATIVE CORONARY ARTERY WITHOUT ANGINA PECTORIS: ICD-10-CM

## 2023-01-26 DIAGNOSIS — E11.9 TYPE 2 DIABETES MELLITUS WITHOUT COMPLICATIONS: ICD-10-CM

## 2023-01-26 DIAGNOSIS — F03.90 UNSPECIFIED DEMENTIA, UNSPECIFIED SEVERITY, WITHOUT BEHAVIORAL DISTURBANCE, PSYCHOTIC DISTURBANCE, MOOD DISTURBANCE, AND ANXIETY: ICD-10-CM

## 2023-01-26 DIAGNOSIS — R29.702 NIHSS SCORE 2: ICD-10-CM

## 2023-01-26 DIAGNOSIS — Z79.02 LONG TERM (CURRENT) USE OF ANTITHROMBOTICS/ANTIPLATELETS: ICD-10-CM

## 2023-01-26 DIAGNOSIS — E78.5 HYPERLIPIDEMIA, UNSPECIFIED: ICD-10-CM

## 2023-01-26 DIAGNOSIS — I10 ESSENTIAL (PRIMARY) HYPERTENSION: ICD-10-CM

## 2023-01-26 DIAGNOSIS — G20 PARKINSON'S DISEASE: ICD-10-CM

## 2023-01-26 DIAGNOSIS — I67.4 HYPERTENSIVE ENCEPHALOPATHY: ICD-10-CM

## 2023-01-26 DIAGNOSIS — M10.9 GOUT, UNSPECIFIED: ICD-10-CM

## 2023-01-26 DIAGNOSIS — Z79.82 LONG TERM (CURRENT) USE OF ASPIRIN: ICD-10-CM

## 2023-06-01 ENCOUNTER — INPATIENT (INPATIENT)
Facility: HOSPITAL | Age: 73
LOS: 3 days | Discharge: HOME | DRG: 872 | End: 2023-06-05
Attending: STUDENT IN AN ORGANIZED HEALTH CARE EDUCATION/TRAINING PROGRAM | Admitting: STUDENT IN AN ORGANIZED HEALTH CARE EDUCATION/TRAINING PROGRAM
Payer: MEDICARE

## 2023-06-01 VITALS
SYSTOLIC BLOOD PRESSURE: 134 MMHG | DIASTOLIC BLOOD PRESSURE: 75 MMHG | RESPIRATION RATE: 18 BRPM | HEIGHT: 66 IN | TEMPERATURE: 99 F | WEIGHT: 167.99 LBS | HEART RATE: 69 BPM | OXYGEN SATURATION: 96 %

## 2023-06-01 DIAGNOSIS — R42 DIZZINESS AND GIDDINESS: ICD-10-CM

## 2023-06-01 DIAGNOSIS — Z90.49 ACQUIRED ABSENCE OF OTHER SPECIFIED PARTS OF DIGESTIVE TRACT: Chronic | ICD-10-CM

## 2023-06-01 LAB
ALBUMIN SERPL ELPH-MCNC: 3.5 G/DL — SIGNIFICANT CHANGE UP (ref 3.5–5.2)
ALP SERPL-CCNC: 71 U/L — SIGNIFICANT CHANGE UP (ref 30–115)
ALT FLD-CCNC: <5 U/L — SIGNIFICANT CHANGE UP (ref 0–41)
ANION GAP SERPL CALC-SCNC: 8 MMOL/L — SIGNIFICANT CHANGE UP (ref 7–14)
APTT BLD: 32.4 SEC — SIGNIFICANT CHANGE UP (ref 27–39.2)
AST SERPL-CCNC: 13 U/L — SIGNIFICANT CHANGE UP (ref 0–41)
BASOPHILS # BLD AUTO: 0.02 K/UL — SIGNIFICANT CHANGE UP (ref 0–0.2)
BASOPHILS NFR BLD AUTO: 0.3 % — SIGNIFICANT CHANGE UP (ref 0–1)
BILIRUB SERPL-MCNC: 0.8 MG/DL — SIGNIFICANT CHANGE UP (ref 0.2–1.2)
BUN SERPL-MCNC: 11 MG/DL — SIGNIFICANT CHANGE UP (ref 10–20)
CALCIUM SERPL-MCNC: 8.7 MG/DL — SIGNIFICANT CHANGE UP (ref 8.4–10.5)
CHLORIDE SERPL-SCNC: 104 MMOL/L — SIGNIFICANT CHANGE UP (ref 98–110)
CO2 SERPL-SCNC: 29 MMOL/L — SIGNIFICANT CHANGE UP (ref 17–32)
CREAT SERPL-MCNC: 1 MG/DL — SIGNIFICANT CHANGE UP (ref 0.7–1.5)
EGFR: 81 ML/MIN/1.73M2 — SIGNIFICANT CHANGE UP
EOSINOPHIL # BLD AUTO: 0.12 K/UL — SIGNIFICANT CHANGE UP (ref 0–0.7)
EOSINOPHIL NFR BLD AUTO: 1.6 % — SIGNIFICANT CHANGE UP (ref 0–8)
GLUCOSE BLDC GLUCOMTR-MCNC: 129 MG/DL — HIGH (ref 70–99)
GLUCOSE SERPL-MCNC: 118 MG/DL — HIGH (ref 70–99)
HCT VFR BLD CALC: 43.1 % — SIGNIFICANT CHANGE UP (ref 42–52)
HGB BLD-MCNC: 15.2 G/DL — SIGNIFICANT CHANGE UP (ref 14–18)
IMM GRANULOCYTES NFR BLD AUTO: 0.3 % — SIGNIFICANT CHANGE UP (ref 0.1–0.3)
INR BLD: 1.06 RATIO — SIGNIFICANT CHANGE UP (ref 0.65–1.3)
LYMPHOCYTES # BLD AUTO: 1.94 K/UL — SIGNIFICANT CHANGE UP (ref 1.2–3.4)
LYMPHOCYTES # BLD AUTO: 25.6 % — SIGNIFICANT CHANGE UP (ref 20.5–51.1)
MCHC RBC-ENTMCNC: 34.2 PG — HIGH (ref 27–31)
MCHC RBC-ENTMCNC: 35.3 G/DL — SIGNIFICANT CHANGE UP (ref 32–37)
MCV RBC AUTO: 97.1 FL — HIGH (ref 80–94)
MONOCYTES # BLD AUTO: 0.68 K/UL — HIGH (ref 0.1–0.6)
MONOCYTES NFR BLD AUTO: 9 % — SIGNIFICANT CHANGE UP (ref 1.7–9.3)
NEUTROPHILS # BLD AUTO: 4.81 K/UL — SIGNIFICANT CHANGE UP (ref 1.4–6.5)
NEUTROPHILS NFR BLD AUTO: 63.2 % — SIGNIFICANT CHANGE UP (ref 42.2–75.2)
NRBC # BLD: 0 /100 WBCS — SIGNIFICANT CHANGE UP (ref 0–0)
PLATELET # BLD AUTO: 160 K/UL — SIGNIFICANT CHANGE UP (ref 130–400)
PMV BLD: 9.1 FL — SIGNIFICANT CHANGE UP (ref 7.4–10.4)
POTASSIUM SERPL-MCNC: 5.1 MMOL/L — HIGH (ref 3.5–5)
POTASSIUM SERPL-SCNC: 5.1 MMOL/L — HIGH (ref 3.5–5)
PROT SERPL-MCNC: 6.1 G/DL — SIGNIFICANT CHANGE UP (ref 6–8)
PROTHROM AB SERPL-ACNC: 12.1 SEC — SIGNIFICANT CHANGE UP (ref 9.95–12.87)
RBC # BLD: 4.44 M/UL — LOW (ref 4.7–6.1)
RBC # FLD: 11.4 % — LOW (ref 11.5–14.5)
SODIUM SERPL-SCNC: 141 MMOL/L — SIGNIFICANT CHANGE UP (ref 135–146)
TROPONIN T SERPL-MCNC: <0.01 NG/ML — SIGNIFICANT CHANGE UP
WBC # BLD: 7.59 K/UL — SIGNIFICANT CHANGE UP (ref 4.8–10.8)
WBC # FLD AUTO: 7.59 K/UL — SIGNIFICANT CHANGE UP (ref 4.8–10.8)

## 2023-06-01 PROCEDURE — 97162 PT EVAL MOD COMPLEX 30 MIN: CPT | Mod: GP

## 2023-06-01 PROCEDURE — 87086 URINE CULTURE/COLONY COUNT: CPT

## 2023-06-01 PROCEDURE — 70496 CT ANGIOGRAPHY HEAD: CPT | Mod: 26,MA

## 2023-06-01 PROCEDURE — 82607 VITAMIN B-12: CPT

## 2023-06-01 PROCEDURE — 70551 MRI BRAIN STEM W/O DYE: CPT

## 2023-06-01 PROCEDURE — 87040 BLOOD CULTURE FOR BACTERIA: CPT

## 2023-06-01 PROCEDURE — 80053 COMPREHEN METABOLIC PANEL: CPT

## 2023-06-01 PROCEDURE — 71045 X-RAY EXAM CHEST 1 VIEW: CPT

## 2023-06-01 PROCEDURE — 85025 COMPLETE CBC W/AUTO DIFF WBC: CPT

## 2023-06-01 PROCEDURE — 70498 CT ANGIOGRAPHY NECK: CPT | Mod: 26,MA

## 2023-06-01 PROCEDURE — 36415 COLL VENOUS BLD VENIPUNCTURE: CPT

## 2023-06-01 PROCEDURE — 83735 ASSAY OF MAGNESIUM: CPT

## 2023-06-01 PROCEDURE — 93970 EXTREMITY STUDY: CPT

## 2023-06-01 PROCEDURE — 70450 CT HEAD/BRAIN W/O DYE: CPT | Mod: 26,XU,MA

## 2023-06-01 PROCEDURE — 83036 HEMOGLOBIN GLYCOSYLATED A1C: CPT

## 2023-06-01 PROCEDURE — 70551 MRI BRAIN STEM W/O DYE: CPT | Mod: 26

## 2023-06-01 PROCEDURE — 81001 URINALYSIS AUTO W/SCOPE: CPT

## 2023-06-01 PROCEDURE — 82962 GLUCOSE BLOOD TEST: CPT

## 2023-06-01 PROCEDURE — 80177 DRUG SCRN QUAN LEVETIRACETAM: CPT

## 2023-06-01 PROCEDURE — 85027 COMPLETE CBC AUTOMATED: CPT

## 2023-06-01 PROCEDURE — 0225U NFCT DS DNA&RNA 21 SARSCOV2: CPT

## 2023-06-01 PROCEDURE — 99285 EMERGENCY DEPT VISIT HI MDM: CPT | Mod: GC

## 2023-06-01 RX ORDER — ASPIRIN/CALCIUM CARB/MAGNESIUM 324 MG
81 TABLET ORAL DAILY
Refills: 0 | Status: DISCONTINUED | OUTPATIENT
Start: 2023-06-01 | End: 2023-06-05

## 2023-06-01 RX ORDER — ATORVASTATIN CALCIUM 80 MG/1
40 TABLET, FILM COATED ORAL AT BEDTIME
Refills: 0 | Status: DISCONTINUED | OUTPATIENT
Start: 2023-06-01 | End: 2023-06-05

## 2023-06-01 RX ORDER — CARVEDILOL PHOSPHATE 80 MG/1
12.5 CAPSULE, EXTENDED RELEASE ORAL EVERY 12 HOURS
Refills: 0 | Status: DISCONTINUED | OUTPATIENT
Start: 2023-06-01 | End: 2023-06-02

## 2023-06-01 RX ORDER — METOCLOPRAMIDE HCL 10 MG
10 TABLET ORAL ONCE
Refills: 0 | Status: COMPLETED | OUTPATIENT
Start: 2023-06-01 | End: 2023-06-01

## 2023-06-01 RX ORDER — TRAZODONE HCL 50 MG
100 TABLET ORAL AT BEDTIME
Refills: 0 | Status: DISCONTINUED | OUTPATIENT
Start: 2023-06-01 | End: 2023-06-05

## 2023-06-01 RX ORDER — LANOLIN ALCOHOL/MO/W.PET/CERES
5 CREAM (GRAM) TOPICAL AT BEDTIME
Refills: 0 | Status: DISCONTINUED | OUTPATIENT
Start: 2023-06-01 | End: 2023-06-05

## 2023-06-01 RX ORDER — CARBIDOPA AND LEVODOPA 25; 100 MG/1; MG/1
2 TABLET ORAL THREE TIMES A DAY
Refills: 0 | Status: DISCONTINUED | OUTPATIENT
Start: 2023-06-01 | End: 2023-06-05

## 2023-06-01 RX ORDER — LEVETIRACETAM 250 MG/1
500 TABLET, FILM COATED ORAL
Refills: 0 | Status: DISCONTINUED | OUTPATIENT
Start: 2023-06-01 | End: 2023-06-05

## 2023-06-01 RX ORDER — MECLIZINE HCL 12.5 MG
50 TABLET ORAL ONCE
Refills: 0 | Status: COMPLETED | OUTPATIENT
Start: 2023-06-01 | End: 2023-06-01

## 2023-06-01 RX ORDER — SODIUM CHLORIDE 9 MG/ML
1000 INJECTION, SOLUTION INTRAVENOUS ONCE
Refills: 0 | Status: COMPLETED | OUTPATIENT
Start: 2023-06-01 | End: 2023-06-01

## 2023-06-01 RX ORDER — ENOXAPARIN SODIUM 100 MG/ML
40 INJECTION SUBCUTANEOUS EVERY 24 HOURS
Refills: 0 | Status: DISCONTINUED | OUTPATIENT
Start: 2023-06-01 | End: 2023-06-05

## 2023-06-01 RX ORDER — ENTACAPONE 200 MG/1
200 TABLET, FILM COATED ORAL
Refills: 0 | Status: DISCONTINUED | OUTPATIENT
Start: 2023-06-01 | End: 2023-06-05

## 2023-06-01 RX ORDER — ZOLPIDEM TARTRATE 10 MG/1
5 TABLET ORAL AT BEDTIME
Refills: 0 | Status: DISCONTINUED | OUTPATIENT
Start: 2023-06-01 | End: 2023-06-02

## 2023-06-01 RX ORDER — LISINOPRIL 2.5 MG/1
40 TABLET ORAL DAILY
Refills: 0 | Status: DISCONTINUED | OUTPATIENT
Start: 2023-06-01 | End: 2023-06-02

## 2023-06-01 RX ADMIN — Medication 50 MILLIGRAM(S): at 14:20

## 2023-06-01 RX ADMIN — LISINOPRIL 40 MILLIGRAM(S): 2.5 TABLET ORAL at 22:13

## 2023-06-01 RX ADMIN — Medication 104 MILLIGRAM(S): at 14:19

## 2023-06-01 RX ADMIN — ATORVASTATIN CALCIUM 40 MILLIGRAM(S): 80 TABLET, FILM COATED ORAL at 22:13

## 2023-06-01 RX ADMIN — ZOLPIDEM TARTRATE 5 MILLIGRAM(S): 10 TABLET ORAL at 22:13

## 2023-06-01 RX ADMIN — SODIUM CHLORIDE 1000 MILLILITER(S): 9 INJECTION, SOLUTION INTRAVENOUS at 14:19

## 2023-06-01 RX ADMIN — Medication 100 MILLIGRAM(S): at 23:02

## 2023-06-01 RX ADMIN — ENOXAPARIN SODIUM 40 MILLIGRAM(S): 100 INJECTION SUBCUTANEOUS at 22:13

## 2023-06-01 RX ADMIN — CARBIDOPA AND LEVODOPA 2 TABLET(S): 25; 100 TABLET ORAL at 22:13

## 2023-06-01 RX ADMIN — Medication 5 MILLIGRAM(S): at 22:12

## 2023-06-01 NOTE — ED PROVIDER NOTE - PROGRESS NOTE DETAILS
Spoke to PT's nurse at [facility], Yayo, confirms symptoms of dizziness x4d not resolved with meclizine, says PT otherwise well, no recent falls -CD

## 2023-06-01 NOTE — H&P ADULT - ASSESSMENT
72 M with PMH of Parkinson's disease, gout, HTN, CVA ('98 and '16), DM, hx of seizure, presenting for presenting with 4 days of vertigo      #Vertigo  - No fall or LOC  - CTH 6/1: 1.  No evidence of acute intracranial pathology. Stable exam since 1/20/2023. 2.  Stable moderate/severe chronic microvascular changes and chronic lacunar infarcts.  - CT angio head and neck 6/1: 1.  No evidence of acute large vessel occlusion. Stable exam since 1/20/2023:2.  Stable scattered atheromatous changes including: mild stenoses of bilateral supraclinoid ICAs. moderate/severe stenoses of bilateral vertebral artery V4 segments. moderate stenosis of the right PCA P1/P2 segment. severe stenosis of the left PCA P4 segment    - Evaluated by neuro: Due to patient with nonfocal neurological deficits, negative CTH (despite sx of 4 days), stable CTA from previous CTA in january 2023, NIHSS 0 - less likely stroke. More likely peripheral etiology vs. orthostatic hypotension due to history of parkinson's     Plan:  - MRI brain without contrast to rule out stroke (requested by neuro)  - orthostatics - due to hx of parkinson's, possibly etiology of sx (autonomic dysfunction)  - Get B12 levels        #Hx of CVA  - Last MRI 1/21/23: There is no evidence of acute intracranial pathology. No evidence of acute infarct, intracranial hemorrhage or mass effect.Moderate chronic microvascular type changes as well as chronic lacunar infarcts as detailed.  - C/w statin  - C/w aspirin     #Hx of seizures  - As per DC summary from neuro in Jan 2023:  In 2020 had an EEG with general slowing and he was started on Keppra.  - C/w Keppra      # HTN, stable  - C/w lisinopril   - C/w carvedilol    # DM II  - Last A1c 5.3 in Sep 2022  - hold home metformin  - Repeat A1c  - check fingersticks start insulin sliding scale if fsg>180    # Parkinson's disease  - c/w entacapone   - c/w cabidopa-levidopa   - RIVASTIGMINE held, not available in our pharmacy, asked patient to bring his home pills    # Insomnia   - c/w trazodone   - c/w melatonin     # DVT prophylaxis - on lovenox subcut  # Code status - Full Code   72 M with PMH of Parkinson's disease, gout, HTN, CVA ('98 and '16), DM, hx of seizure, presenting for presenting with 4 days of vertigo      #Vertigo  - No fall or LOC  - CTH 6/1: 1.  No evidence of acute intracranial pathology. Stable exam since 1/20/2023. 2.  Stable moderate/severe chronic microvascular changes and chronic lacunar infarcts.  - CT angio head and neck 6/1: 1.  No evidence of acute large vessel occlusion. Stable exam since 1/20/2023:2.  Stable scattered atheromatous changes including: mild stenoses of bilateral supraclinoid ICAs. moderate/severe stenoses of bilateral vertebral artery V4 segments. moderate stenosis of the right PCA P1/P2 segment. severe stenosis of the left PCA P4 segment    - Evaluated by neuro: Due to patient with nonfocal neurological deficits, negative CTH (despite sx of 4 days), stable CTA from previous CTA in january 2023, NIHSS 0 - less likely stroke. More likely peripheral etiology vs. orthostatic hypotension due to history of parkinson's       - In general, syncope/dizziness can be cardiovascular or Non-cardiovascular  - Cardiovascular: Arrhythmia (consider remote tele or ILR if MRI negative) vs outflow obstruction (Last TTE in sept 2022 no evidence of that and no obvious murmur on exam)  - Non-cardiovascular/Neurogenic: Stroke/TIA (will get MRI as per neuro), orthostasis (likely in setting of parkinson), vasovagal, seizures..    Plan:  - MRI brain without contrast to rule out stroke (requested by neuro)  - orthostatics - due to hx of parkinson's, possibly etiology of sx (autonomic dysfunction)  - Get B12 levels        #Hx of CVA  - Last MRI 1/21/23: There is no evidence of acute intracranial pathology. No evidence of acute infarct, intracranial hemorrhage or mass effect.Moderate chronic microvascular type changes as well as chronic lacunar infarcts as detailed.  - C/w statin  - C/w aspirin     #Hx of seizures  - As per DC summary from neuro in Jan 2023:  In 2020 had an EEG with general slowing and he was started on Keppra.  - C/w Keppra      # HTN, stable  - C/w lisinopril   - C/w carvedilol    # DM II  - Last A1c 5.3 in Sep 2022  - hold home metformin  - Repeat A1c  - check fingersticks start insulin sliding scale if fsg>180    # Parkinson's disease  - c/w entacapone   - c/w cabidopa-levidopa   - RIVASTIGMINE held, not available in our pharmacy, asked patient to bring his home pills    # Insomnia   - c/w trazodone   - c/w melatonin     # DVT prophylaxis - on lovenox subcut  # Code status - Full Code   72 M with PMH of Parkinson's disease, gout, HTN, CVA ('98 and '16), DM, hx of seizure, presenting for presenting with 4 days of vertigo      #Acute onset sustained vertigo  - No fall or LOC  - DDx includes BPPV vs vestibular neuritis vs vascular causes (TIA or stroke)  - CTH 6/1: 1.  No evidence of acute intracranial pathology. Stable exam since 1/20/2023. 2.  Stable moderate/severe chronic microvascular changes and chronic lacunar infarcts.  - CT angio head and neck 6/1: 1.  No evidence of acute large vessel occlusion. Stable exam since 1/20/2023:2.  Stable scattered atheromatous changes including: mild stenoses of bilateral supraclinoid ICAs. moderate/severe stenoses of bilateral vertebral artery V4 segments. moderate stenosis of the right PCA P1/P2 segment. severe stenosis of the left PCA P4 segment    - Evaluated by neuro: Due to patient with nonfocal neurological deficits, negative CTH (despite sx of 4 days), stable CTA from previous CTA in january 2023, NIHSS 0 - less likely stroke. More likely peripheral etiology vs. orthostatic hypotension due to history of parkinson's       Plan:  - MRI brain without contrast to rule out stroke (requested by neuro)  - orthostatics - due to hx of parkinson's, possibly etiology of sx (autonomic dysfunction)  - ENT consult      #Hx of CVA  - Last MRI 1/21/23: There is no evidence of acute intracranial pathology. No evidence of acute infarct, intracranial hemorrhage or mass effect.Moderate chronic microvascular type changes as well as chronic lacunar infarcts as detailed.  - C/w statin  - C/w aspirin     #Hx of seizures  - As per DC summary from neuro in Jan 2023:  In 2020 had an EEG with general slowing and he was started on Keppra.  - C/w Keppra      # HTN, stable  - C/w lisinopril   - C/w carvedilol    # DM II  - Last A1c 5.3 in Sep 2022  - hold home metformin  - Repeat A1c  - check fingersticks start insulin sliding scale if fsg>180    # Parkinson's disease  - c/w entacapone   - c/w cabidopa-levidopa   - RIVASTIGMINE held, not available in our pharmacy, asked patient to bring his home pills    # Insomnia   - c/w trazodone   - c/w melatonin     #DVT prophylaxis: Lovenox 40mg   #Gi prophylaxis: not needed  #Diet: Dash, diabetic  #Activity: ambulate with assistance             72 M with PMH of Parkinson's disease, gout, HTN, CVA ('98 and '16), DM, hx of seizure, presenting for presenting with 4 days of vertigo      #Acute onset sustained vertigo  - No fall or LOC  - DDx includes BPPV vs vestibular neuritis vs vascular causes (TIA or stroke) vs orthostasis (would not cause shani vertigo though)  - CTH 6/1: 1.  No evidence of acute intracranial pathology. Stable exam since 1/20/2023. 2.  Stable moderate/severe chronic microvascular changes and chronic lacunar infarcts.  - CT angio head and neck 6/1: 1.  No evidence of acute large vessel occlusion. Stable exam since 1/20/2023:2.  Stable scattered atheromatous changes including: mild stenoses of bilateral supraclinoid ICAs. moderate/severe stenoses of bilateral vertebral artery V4 segments. moderate stenosis of the right PCA P1/P2 segment. severe stenosis of the left PCA P4 segment  - Evaluated by neuro: Due to patient with nonfocal neurological deficits, negative CTH (despite sx of 4 days), stable CTA from previous CTA in january 2023, NIHSS 0 - less likely stroke. More likely peripheral etiology vs. orthostatic hypotension due to history of parkinson's     Plan:  - MRI brain without contrast to rule out stroke (requested by neuro)  - Orthostatics - due to hx of parkinson's, possibly etiology of sx (autonomic dysfunction)(would not cause shani vertigo though)  - ENT consult      #Hx of CVA  - Last MRI 1/21/23: There is no evidence of acute intracranial pathology. No evidence of acute infarct, intracranial hemorrhage or mass effect.Moderate chronic microvascular type changes as well as chronic lacunar infarcts as detailed.  - Not on statin as per Marquis BATRES, will resume here  - C/w aspirin     #Hx of seizures  - As per DC summary from neuro in Jan 2023:  In 2020 had an EEG with general slowing and he was started on Keppra.  - C/w Keppra    # HTN, stable  - C/w lisinopril   - C/w carvedilol    # DM II  - Last A1c 5.3 in Sep 2022  - hold home metformin  - Repeat A1c  - check fingersticks start insulin sliding scale if fsg>180    # Parkinson's disease  - c/w entacapone   - c/w cabidopa-levidopa   - RIVASTIGMINE held, not available in our pharmacy, ask patient to bring it tomorrow (might be discharged in AM)    # Insomnia   - c/w trazodone   - C/w [place] therapeutic interchange  - c/w melatonin     #DVT prophylaxis: Lovenox 40mg   #GI prophylaxis: not needed  #Diet: Dash, diabetic  #Activity: ambulate with assistance   72 year old with PMH of HTN, parkinson's disease (diagnosed 6 years ago), Hx of ischemic CVA, DM, Hx of ?seizures on keppra who presented to the ED for evaluation of vertigo.    #Acute onset sustained vertigo  - No fall or LOC  - DDx includes BPPV vs vestibular neuritis vs vascular causes (TIA or stroke) vs orthostasis (would not cause shani vertigo though)  - CTH 6/1: 1.  No evidence of acute intracranial pathology. Stable exam since 1/20/2023. 2.  Stable moderate/severe chronic microvascular changes and chronic lacunar infarcts.  - CT angio head and neck 6/1: 1.  No evidence of acute large vessel occlusion. Stable exam since 1/20/2023:2.  Stable scattered atheromatous changes including: mild stenoses of bilateral supraclinoid ICAs. moderate/severe stenoses of bilateral vertebral artery V4 segments. moderate stenosis of the right PCA P1/P2 segment. severe stenosis of the left PCA P4 segment  - Evaluated by neuro: Due to patient with nonfocal neurological deficits, negative CTH (despite sx of 4 days), stable CTA from previous CTA in january 2023, NIHSS 0 - less likely stroke. More likely peripheral etiology vs. orthostatic hypotension due to history of parkinson's     Plan:  - MRI brain without contrast to rule out stroke (requested by neuro)  - Orthostatics - due to hx of parkinson's, possibly etiology of sx (autonomic dysfunction)(would not cause shani vertigo though)  - ENT consult      #Hx of CVA  - Last MRI 1/21/23: There is no evidence of acute intracranial pathology. No evidence of acute infarct, intracranial hemorrhage or mass effect.Moderate chronic microvascular type changes as well as chronic lacunar infarcts as detailed.  - Not on statin as per Marquis BATRES, will resume here  - C/w aspirin     #Hx of seizures  - As per DC summary from neuro in Jan 2023:  In 2020 had an EEG with general slowing and he was started on Keppra.  - C/w Keppra    # HTN, stable  - C/w lisinopril   - C/w carvedilol    # DM II  - Last A1c 5.3 in Sep 2022  - hold home metformin  - Repeat A1c  - check fingersticks start insulin sliding scale if fsg>180    # Parkinson's disease  - c/w entacapone   - c/w cabidopa-levidopa   - RIVASTIGMINE held, not available in our pharmacy, ask patient to bring it tomorrow (might be discharged in AM)    # Insomnia   - c/w trazodone   - C/w [place] therapeutic interchange  - c/w melatonin     #DVT prophylaxis: Lovenox 40mg   #GI prophylaxis: not needed  #Diet: Dash, diabetic  #Activity: ambulate with assistance

## 2023-06-01 NOTE — H&P ADULT - NSHPPHYSICALEXAM_GEN_ALL_CORE
PHYSICAL EXAM:  CONSTITUTIONAL: No acute distress, well-developed, well-groomed, AAOx3  PULMONARY: Clear to auscultation bilaterally; no wheezes, rales, or rhonchi  CARDIOVASCULAR: Regular rate and rhythm; no murmurs, rubs, or gallops  GASTROINTESTINAL: Soft, non-tender, non-distended; bowel sounds present  MUSCULOSKELETAL: 2+ peripheral pulses; no LLE  NEUROLOGY: non-focal, muscle power 5/5  SKIN: No rashes or lesions; warm and dry

## 2023-06-01 NOTE — ED PROVIDER NOTE - CLINICAL SUMMARY MEDICAL DECISION MAKING FREE TEXT BOX
pt with dizziness, labs, ekg reassuring.  cth unchanged.  neuro consulted and pt is recommended to be admitted for MRI.  Any ordered labs and EKG were reviewed.  Any imaging was ordered and reviewed by me.  Appropriate medications for patient's presenting complaints were ordered and effects were reassessed.  Patient's records (prior hospital, ED visit, and/or nursing home notes if available) were reviewed.  Additional history was obtained from EMS, family, and/or PCP (where available).  Escalation to admission/observation was considered.  Patient requires inpatient hospitalization - monitored setting.

## 2023-06-01 NOTE — CONSULT NOTE ADULT - ASSESSMENT
Assessment and Plan  72y Male      Recommend:  MRI brain without contrast to rule out stroke Assessment and Plan  72 year old with PMH of HTN, parkinson's disease (diagnosed 6 years ago), 2 strokes in the past, who presented to the ED due to dizziness that has been persistent since Monday morning. Stroke team consulted due to persistent dizziness.   Due to patient with nonfocal neurological deficits, negative CTH (despite sx of 4 days), stable CTA from previous CTA in january 2023, NIHSS 0 - less likely stroke. More likely peripheral etiology vs. orthostatic hypotension due to history of parkinson's     Recommendations:  - MRI brain without contrast to rule out stroke  - orthostatics - due to hx of parkinson's, possibly etiology of sx  - dispo as per ED  Discussed with attending, Dr. Locke

## 2023-06-01 NOTE — H&P ADULT - HISTORY OF PRESENT ILLNESS
72 year old with PMH of HTN, parkinson's disease (diagnosed 6 years ago), 2 strokes in the past, who presented to the ED due to dizziness that has been persistent since Monday morning 72 year old with PMH of HTN, parkinson's disease (diagnosed 6 years ago), 2 strokes in the past, who presented to the ED due to dizziness that has been persistent since Monday morning.    patient states that on monday after waking up, he started having a spinning sensation that has been going on/off until now. It is exacerbated by head movement and partially relieved by rest.  Associated with gait instability. Ambulated with cane +/- walker at baseline.    No recent upper respiratory tract infection. no hearing loss, no headache, no fever, no muscle weakness, no vision changes.    In the ED,  CTH was done:  CTH 6/1: 1.  No evidence of acute intracranial pathology. Stable exam since 1/20/2023. 2.  Stable moderate/severe chronic microvascular changes and chronic lacunar infarcts.  - CT angio head and neck 6/1: 1.  No evidence of acute large vessel occlusion. Stable exam since 1/20/2023:2.  Stable scattered atheromatous changes including: mild stenoses of bilateral supraclinoid ICAs. moderate/severe stenoses of bilateral vertebral artery V4 segments. moderate stenosis of the right PCA P1/P2 segment. severe stenosis of the left PCA P4 segment    - Evaluated by neuro: Due to patient with nonfocal neurological deficits, negative CTH (despite sx of 4 days), stable CTA from previous CTA in january 2023, NIHSS 0 - less likely stroke. More likely peripheral etiology vs. orthostatic hypotension due to history of parkinson's.     72 year old with PMH of HTN, parkinson's disease (diagnosed 6 years ago), Hx of ischemic CVA, DM, Hx of ?seizures on keepra who presented to the ED for evaluation of vertigo.    Patient states that on monday after waking up, he started having a spinning sensation that has been going on/off until now. It is exacerbated by head movement and partially relieved by rest.  Associated with gait instability. Ambulated with cane +/- walker at baseline.    No recent upper respiratory tract infection. no hearing loss, no headache, no fever, no muscle weakness, no vision changes.    In the ED,  CTH was done:  CTH 6/1: 1.  No evidence of acute intracranial pathology. Stable exam since 1/20/2023. 2.  Stable moderate/severe chronic microvascular changes and chronic lacunar infarcts.  - CT angio head and neck 6/1: 1.  No evidence of acute large vessel occlusion. Stable exam since 1/20/2023:2.  Stable scattered atheromatous changes including: mild stenoses of bilateral supraclinoid ICAs. moderate/severe stenoses of bilateral vertebral artery V4 segments. moderate stenosis of the right PCA P1/P2 segment. severe stenosis of the left PCA P4 segment    - Evaluated by neuro: Due to patient with nonfocal neurological deficits, negative CTH (despite sx of 4 days), stable CTA from previous CTA in january 2023, NIHSS 0 - less likely stroke. More likely peripheral etiology vs. orthostatic hypotension due to history of parkinson's.     72 year old with PMH of HTN, parkinson's disease (diagnosed 6 years ago), Hx of ischemic CVA, DM, Hx of ?seizures on keppra who presented to the ED for evaluation of vertigo.    Patient states that on monday after waking up, he started having a spinning sensation that has been going on/off until now. It is exacerbated by head movement and partially relieved by rest.  Associated with gait instability. Ambulated with cane +/- walker at baseline.    No recent upper respiratory tract infection. no hearing loss, no headache, no fever, no muscle weakness, no vision changes.    In the ED,  CTH was done:  CTH 6/1: 1.  No evidence of acute intracranial pathology. Stable exam since 1/20/2023. 2.  Stable moderate/severe chronic microvascular changes and chronic lacunar infarcts.  - CT angio head and neck 6/1: 1.  No evidence of acute large vessel occlusion. Stable exam since 1/20/2023:2.  Stable scattered atheromatous changes including: mild stenoses of bilateral supraclinoid ICAs. moderate/severe stenoses of bilateral vertebral artery V4 segments. moderate stenosis of the right PCA P1/P2 segment. severe stenosis of the left PCA P4 segment    - Evaluated by neuro: Due to patient with nonfocal neurological deficits, negative CTH (despite sx of 4 days), stable CTA from previous CTA in january 2023, NIHSS 0 - less likely stroke. More likely peripheral etiology vs. orthostatic hypotension due to history of parkinson's.

## 2023-06-01 NOTE — CONSULT NOTE ADULT - ASSESSMENT
Pt is a 71yo Male who presents with PMH of HTN, DM, Parkinson's, Hx of ischemic CVA ('98 and '16), Seizures on Keppra presents to ED with persistent dizziness for 4 days- ENT c/s for vertigo. During examination patient became dysarthric, unable to form full sentences (seen in previous admission in 1/2023)- ENT c/s for vertigo.     A) persistent dizziness     Plan:   - Obtain MRI Brain with IACs to r/o stroke   - Obtain Orthostatics  - F/u Neurology recs    - Debrox to b/l ears 5gtt BID for 7 days   - Will d/w attending

## 2023-06-01 NOTE — ED PROVIDER NOTE - OBJECTIVE STATEMENT
Patient is a 72-year-old male with past medical history of Parkinson's disease, dementia, seizures, stroke x2 in the past with residual left hand weakness presenting with 4 days of vertigo.  Patient says symptoms are worse with speaking.  Patient denies preceding head trauma.  Patient says he was given meclizine at nursing home without improvement.  Patient denies recent viral illness, fever, chills.  Patient denies focal numbness or weakness different from baseline.  Patient otherwise well

## 2023-06-01 NOTE — ED ADULT NURSE NOTE - NSFALLRISKINTERV_ED_ALL_ED
Assistance OOB with selected safe patient handling equipment if applicable/Assistance with ambulation/Communicate fall risk and risk factors to all staff, patient, and family/Encourage patient to sit up slowly, dangle for a short time, stand at bedside before walking/Monitor gait and stability/Orthostatic vital signs/Provide patient with walking aids/Provide visual cue: yellow wristband, yellow gown, etc/Reinforce activity limits and safety measures with patient and family/Bed in lowest position, wheels locked, appropriate side rails in place/Call bell, personal items and telephone in reach/Instruct patient to call for assistance before getting out of bed/chair/stretcher/Non-slip footwear applied when patient is off stretcher/[place] to call system/Physically safe environment - no spills, clutter or unnecessary equipment/Purposeful Proactive Rounding/Room/bathroom lighting operational, light cord in reach

## 2023-06-01 NOTE — H&P ADULT - ATTENDING COMMENTS
HPI:  72 year old with PMH of HTN, parkinson's disease (diagnosed 6 years ago), Hx of ischemic CVA, DM, Hx of ?seizures on keepra who presented to the ED for evaluation of vertigo.    Patient states that on monday after waking up, he started having a spinning sensation that has been going on/off until now. It is exacerbated by head movement and partially relieved by rest.  Associated with gait instability. Ambulated with cane +/- walker at baseline.    No recent upper respiratory tract infection. no hearing loss, no headache, no fever, no muscle weakness, no vision changes.    In the ED,  CTH was done:  CTH 6/1: 1.  No evidence of acute intracranial pathology. Stable exam since 1/20/2023. 2.  Stable moderate/severe chronic microvascular changes and chronic lacunar infarcts.  - CT angio head and neck 6/1: 1.  No evidence of acute large vessel occlusion. Stable exam since 1/20/2023:2.  Stable scattered atheromatous changes including: mild stenoses of bilateral supraclinoid ICAs. moderate/severe stenoses of bilateral vertebral artery V4 segments. moderate stenosis of the right PCA P1/P2 segment. severe stenosis of the left PCA P4 segment    - Evaluated by neuro: Due to patient with nonfocal neurological deficits, negative CTH (despite sx of 4 days), stable CTA from previous CTA in january 2023, NIHSS 0 - less likely stroke. More likely peripheral etiology vs. orthostatic hypotension due to history of parkinson's.     (01 Jun 2023 17:56)    REVIEW OF SYSTEMS: see cc/HPI   CONSTITUTIONAL: No weakness, fevers or chills  EYES/ENT: No visual changes;  No vertigo or throat pain   NECK: No pain or stiffness  RESPIRATORY: No cough, wheezing, hemoptysis; No shortness of breath  CARDIOVASCULAR: No chest pain or palpitations  GASTROINTESTINAL: No abdominal or epigastric pain. No nausea, vomiting, or hematemesis; No diarrhea or constipation. No melena or hematochezia.  GENITOURINARY: No dysuria, frequency or hematuria  NEUROLOGICAL: No numbness or weakness  SKIN: No itching, rashes    Physical Exam:   General: WN/WD NAD  Neurology: A&Ox3, nonfocal, follows commands  Eyes: PERRLA/ EOMI  ENT/Neck: Neck supple, trachea midline, No JVD  Respiratory: CTA B/L, No wheezing, rales, rhonchi  CV: Normal rate regular rhythm, S1S2, no murmurs, rubs or gallops  Abdominal: Soft, NT, ND +BS,   Extremities: No edema, + peripheral pulses  Skin: No Rashes, Hematoma, Ecchymosis    A/p HPI:  72 year old with PMH of HTN, parkinson's disease (diagnosed 6 years ago), Hx of ischemic CVA, DM, Hx of ?seizures on keepra who presented to the ED for evaluation of vertigo.    Patient states that on monday after waking up, he started having a spinning sensation that has been going on/off until now. It is exacerbated by head movement and partially relieved by rest.  Associated with gait instability. Ambulated with cane +/- walker at baseline.    No recent upper respiratory tract infection. no hearing loss, no headache, no fever, no muscle weakness, no vision changes.    In the ED,  CTH was done:  CTH 6/1: 1.  No evidence of acute intracranial pathology. Stable exam since 1/20/2023. 2.  Stable moderate/severe chronic microvascular changes and chronic lacunar infarcts.  - CT angio head and neck 6/1: 1.  No evidence of acute large vessel occlusion. Stable exam since 1/20/2023:2.  Stable scattered atheromatous changes including: mild stenoses of bilateral supraclinoid ICAs. moderate/severe stenoses of bilateral vertebral artery V4 segments. moderate stenosis of the right PCA P1/P2 segment. severe stenosis of the left PCA P4 segment    - Evaluated by neuro: Due to patient with nonfocal neurological deficits, negative CTH (despite sx of 4 days), stable CTA from previous CTA in january 2023, NIHSS 0 - less likely stroke. More likely peripheral etiology vs. orthostatic hypotension due to history of parkinson's.     (01 Jun 2023 17:56)    REVIEW OF SYSTEMS: see cc/HPI   CONSTITUTIONAL: No weakness, fevers or chills  EYES/ENT: No visual changes;  (+) vertigo or throat pain   NECK: No pain or stiffness  RESPIRATORY: No cough, wheezing, hemoptysis; No shortness of breath  CARDIOVASCULAR: No chest pain or palpitations  GASTROINTESTINAL: No abdominal or epigastric pain. No nausea, vomiting, or hematemesis; No diarrhea or constipation. No melena or hematochezia.  GENITOURINARY: No dysuria, frequency or hematuria  NEUROLOGICAL: No numbness or weakness,  see cc/HPI , dizziness/ vertiginous symptoms , (+) PD , H/o CVA  SKIN: No itching, rashes  MSK: No deformity or fracture     Physical Exam:   General: WN/WD NAD, slow to respond but appropriate   Neurology: A&Ox3, (+) poor memory , nonfocal, follows commands  Eyes: PERRLA/ EOMI, No nystagmus   ENT/Neck: Neck supple, trachea midline, No JVD  Respiratory: CTA B/L, No wheezing, rales, rhonchi  CV: Normal rate regular rhythm, S1S2, no murmurs, rubs or gallops  Abdominal: Soft, NT, ND +BS,   Extremities: No edema, + peripheral pulses  Skin: No Rashes, Hematoma, Ecchymosis    A/p  Vertigo - persistent r/o Vestibular etiology vs. BPPV   Gait instability 2/2 Vertigo and underlying Parkinson's disease   -MRI w/o contrast   -Fall precautions   -Orthostatic vitals   -Neurology follow up   -PT/Rehab for gait / balance eval and training   -OOB only w/ assistance     H/o CVA   H/o PD   H/o Seizure d/o   -c/w Keppra and check levels r/o toxicity    DM type II   -agree w/ F/s monitoring and ISS PRN     HTN - stable   c/w OP Rx    Parkinson disease   c/w OP Rx    Insomnia   -Trazodone and melatonin   -hold Lunesta ( ambien ) to avoid polypharmacy    DVT prophylaxis     PATIENT SEEN by ATTENDING 6/1/23 ( note revised 6/2)

## 2023-06-01 NOTE — ED PROVIDER NOTE - PHYSICAL EXAMINATION
CONSTITUTIONAL: Well-developed; well-nourished; NAD  SKIN: warm, dry, w/o rash  HEAD: NCAT  EYES: PERRLA, inducible horizontal nystagmus, EOMI, no conjunctival injection  ENT: No nasal discharge; nl OP without erythema or exudates  NECK: Supple, non-tender  CARD: nl S1, S2; RRR, no MRG, no JVD  RESP: CTAB, normal respiratory effort  ABD: BS+, soft, NTND, no HSM  EXT: Normal ROM.  No clubbing, cyanosis or edema  NEURO: Alert, oriented, grossly unremarkable: CN 2-12 grossly intact; nl cerebellar exam  PSYCH: Cooperative, appropriate

## 2023-06-01 NOTE — ED ADULT NURSE NOTE - OBJECTIVE STATEMENT
72 year old male sent from [facility] assisted living complaining of "vertigo" since Monday. Pt denies recent fall, ht, fever, nausea, vomiting, chest pain, sob, abdominal pain.

## 2023-06-01 NOTE — ED PROVIDER NOTE - ATTENDING CONTRIBUTION TO CARE
71 yo m with pmh of parkinsons, dementia, seizure, cva x 2 (L hand residual weakness) sent from [facility] assisted living for dizziness.  pt says started when he woke up 3 days ago.  pt says was severe on the day of onset, felt like spinning.  no associated nausea or vomiting.  denies headache or trauma.  pt denies new focal numbness, weakness, facial droop. slurred speech, vision changes.  denies fever or chills.  denies fall.  no cp or sob.  assisted living has been giving pt meclizine, but symptoms persists.  exam: nad, ncat, perrl, eomi, mmm, rrr, ctab, abd soft, nt, nd, aox3, cn2-12 normal, 5/5 strength all ext, sensation intact, finger to nose normal, romberg neg, prontator drift neg, ataxic gait imp: imp: pt with dizziness, will check labs, cth, cta h/n, neuro consult

## 2023-06-01 NOTE — PATIENT PROFILE ADULT - FALL HARM RISK - HARM RISK INTERVENTIONS

## 2023-06-01 NOTE — CONSULT NOTE ADULT - NS ATTEND AMEND GEN_ALL_CORE FT
Patient seen and examined at bedside.    I reviewed and interpreted MRI images showing no IAC lesion within limits of exam.   No otologic complains.    Patient to go for VNG as outpatient.
Patient seen and examined and agree with above except as noted.  Patients history, notes ,labs, imaging, vitals and meds reviewed personally.  Patient with lightheaded dizziness since monday  No dysmetria, no dysdiadokinesia  Motor and sensory symmetric    Plan as above  If MRI brain (-) for acute ischemic changes please recall PRN

## 2023-06-01 NOTE — CONSULT NOTE ADULT - SUBJECTIVE AND OBJECTIVE BOX
Pt is a 73yo Male who presents with PMH of HTN, DM, Parkinson's, Hx of ischemic CVA ('98 and '16), Seizures on Keppra presents to ED with persistent dizziness for 4 days- ENT c/s for vertigo. Patient seen and examined at bedside. Patient appears in NAD; however during evaluation noted with dysarthria with difficulty speaking full sentences. Patient report he felt dizziness upon waking up to go to bathroom on Monday. He reports symptoms were intermittent, aggravated with movement, no alleviating factors. Patient reports he feels as if he is spinning. Denies any following an ENT in the past or previous hx of vertigo. Patient denies fever, chills, tinnitus, decreased hearing, vision changes, headaches, otorrhea.      In ED, CT Head shows No evidence of acute intracranial pathology. Stable exam since 1/20/2023. Stable scattered atheromatous changes including: mild stenoses of bilateral supraclinoid ICAs; moderate/severe stenoses of bilateral vertebral artery V4 segments, moderate stenosis of the right PCA P1/P2 segment, severe stenosis of the left PCA P4 segment.       HPI:  72 year old with PMH of HTN, parkinson's disease (diagnosed 6 years ago), Hx of ischemic CVA , DM, Hx of ?seizures on keepra who presented to the ED for evaluation of vertigo.    Patient states that on monday after waking up, he started having a spinning sensation that has been going on/off until now. It is exacerbated by head movement and partially relieved by rest.  Associated with gait instability. Ambulated with cane +/- walker at baseline.    No recent upper respiratory tract infection. no hearing loss, no headache, no fever, no muscle weakness, no vision changes.    In the ED,  CTH was done:  CTH 6/1: 1.  No evidence of acute intracranial pathology. Stable exam since 1/20/2023. 2.  Stable moderate/severe chronic microvascular changes and chronic lacunar infarcts.  - CT angio head and neck 6/1: 1.  No evidence of acute large vessel occlusion. Stable exam since 1/20/2023:2.  Stable scattered atheromatous changes including: mild stenoses of bilateral supraclinoid ICAs. moderate/severe stenoses of bilateral vertebral artery V4 segments. moderate stenosis of the right PCA P1/P2 segment. severe stenosis of the left PCA P4 segment    - Evaluated by neuro: Due to patient with nonfocal neurological deficits, negative CTH (despite sx of 4 days), stable CTA from previous CTA in january 2023, NIHSS 0 - less likely stroke. More likely peripheral etiology vs. orthostatic hypotension due to history of parkinson's.     (01 Jun 2023 17:56)      PAST MEDICAL & SURGICAL HISTORY:  Parkinson disease      CVA (cerebral vascular accident)      HTN (hypertension)      Diabetes mellitus      Gout      History of appendectomy          MEDICATIONS  (STANDING):  aspirin enteric coated 81 milliGRAM(s) Oral daily  atorvastatin 40 milliGRAM(s) Oral at bedtime  carbidopa/levodopa  25/100 2 Tablet(s) Oral three times a day  carvedilol 12.5 milliGRAM(s) Oral every 12 hours  enoxaparin Injectable 40 milliGRAM(s) SubCutaneous every 24 hours  entacapone 200 milliGRAM(s) Oral four times a day  levETIRAcetam 500 milliGRAM(s) Oral two times a day  lisinopril 40 milliGRAM(s) Oral daily  melatonin 5 milliGRAM(s) Oral at bedtime  traZODone 100 milliGRAM(s) Oral at bedtime  zolpidem 5 milliGRAM(s) Oral at bedtime    MEDICATIONS  (PRN):      Allergies    sulfa drugs (Unknown)  penicillin (Unknown)  cefaclor (Unknown)    Intolerances          SOCIAL HISTORY:    FAMILY HISTORY:  FH: hypertension    FH: CAD (coronary artery disease)        REVIEW OF SYSTEMS   [ ] Due to altered mental status/intubation, subjective information were not able to be obtained from patient. History was obtained, to the extent possible, from review of the chart and collateral sources of information.    Vital Signs Last 24 Hrs  T(C): 36.7 (01 Jun 2023 15:30), Max: 37.1 (01 Jun 2023 11:47)  T(F): 98 (01 Jun 2023 15:30), Max: 98.8 (01 Jun 2023 11:47)  HR: 60 (01 Jun 2023 15:30) (60 - 69)  BP: 160/81 (01 Jun 2023 15:30) (134/75 - 160/81)  RR: 18 (01 Jun 2023 15:30) (18 - 18)  SpO2: 98% (01 Jun 2023 15:30) (96% - 98%)    Parameters below as of 01 Jun 2023 15:30  Patient On (Oxygen Delivery Method): room air        GEN:  Awake and alert. No drooling or pooling of secretions. No stridor or stertor. Good vocal quality, no hoarseness. Patient appears mildly confused with dysarthria, unable to complete full sentences.   SKIN: Good color, non diaphoretic.  HEENT: NC/AT; EYES: EOMI, no nystagmus noted on exam. Attempted joao-halpike, patient unable to tolerate exam. EARS: b/l EAC with cerumen impaction, unable to visualize TM at this time, no mastoid tenderness/edema noted  NECK: Traceha midline, Neck supple,   RESP: No dyspnea, non-labored breathing. No use of accessory muscles.   CARDIO: +S1/S2  ABDO: Soft, NT.  EXT: SALMERON x 4      LABS:                        15.2   7.59  )-----------( 160      ( 01 Jun 2023 14:17 )             43.1     06-01    141  |  104  |  11  ----------------------------<  118<H>  5.1<H>   |  29  |  1.0    Ca    8.7      01 Jun 2023 14:17    TPro  6.1  /  Alb  3.5  /  TBili  0.8  /  DBili  x   /  AST  13  /  ALT  <5  /  AlkPhos  71  06-01    PT/INR - ( 01 Jun 2023 14:17 )   PT: 12.10 sec;   INR: 1.06 ratio         PTT - ( 01 Jun 2023 14:17 )  PTT:32.4 sec      RADIOLOGY & ADDITIONAL STUDIES:  < from: CT Angio Head w/ IV Cont (06.01.23 @ 15:01) >  ACC: [id number] EXAM:  CT ANGIO BRAIN (W)AW IC   ORDERED BY: FRANKLIN WHALEY     ACC: [id number] EXAM:  CT ANGIO NECK (W)AW IC   ORDERED BY: FRANKLIN WHALEY     PROCEDURE DATE:  06/01/2023          INTERPRETATION:  Clinical History / Reason for exam:Vertigo    Technique: CT angiogram of the head and neck. Contiguous CT axial images   of the head and neck were obtained following the intravenous   administration of 100 cc Optiray 320 (0 cc discarded) with coronal,   sagittal and multiple 3-D MIP and volume rendered reformats.    Comparison: CTA neck and brain dated 1/20/2023.    Findings:    NECK:  The visualized aortic arch and great vessel origins demonstrates calcific   plaque without significant stenosis.    The common, internal and external carotid arteries are patent. There is   calcific plaque at bilateral carotid bifurcations without significant   stenosis.    The vertebral arteries are patent.    HEAD:  There is calcific plaque at the carotid siphons with mild bilateral   stenosis. The anterior and middle cerebral arteries are patent.    Stable moderate/severe stenosis of the V4 segments of bilateral vertebral   arteries. The basilar artery is patent. Stable moderate stenosis of the   right PCA P1 segment. Stable severe focal stenosis in the left PCA cord   inferior branch of the P4 segment.    OTHER: Multilevel degenerative changes of the spine. Accessory salivary   glandular tissue in the left level 1B station.      IMPRESSION:    1.  No evidence of acute large vessel occlusion. Stable exam since   1/20/2023:    2.  Stable scattered atheromatous changes including:  -mild stenoses of bilateral supraclinoid ICAs  -moderate/severe stenoses of bilateral vertebral artery V4 segments  -moderate stenosis of the right PCA P1/P2 segment  -severe stenosis of the left PCA P4 segment    --- End of Report ---      JONO DOMINGUEZ MD; Attending Radiologist  This document has been electronically signed. Jun 1 2023  3:22PM    < end of copied text >

## 2023-06-01 NOTE — H&P ADULT - NSHPLABSRESULTS_GEN_ALL_CORE
VITAL SIGNS: Last 24 Hours  T(C): 36.7 (01 Jun 2023 15:30), Max: 37.1 (01 Jun 2023 11:47)  T(F): 98 (01 Jun 2023 15:30), Max: 98.8 (01 Jun 2023 11:47)  HR: 60 (01 Jun 2023 15:30) (60 - 69)  BP: 160/81 (01 Jun 2023 15:30) (134/75 - 160/81)  BP(mean): --  RR: 18 (01 Jun 2023 15:30) (18 - 18)  SpO2: 98% (01 Jun 2023 15:30) (96% - 98%)    LABS:                        15.2   7.59  )-----------( 160      ( 01 Jun 2023 14:17 )             43.1     06-01    141  |  104  |  11  ----------------------------<  118<H>  5.1<H>   |  29  |  1.0    Ca    8.7      01 Jun 2023 14:17    TPro  6.1  /  Alb  3.5  /  TBili  0.8  /  DBili  x   /  AST  13  /  ALT  <5  /  AlkPhos  71  06-01    PT/INR - ( 01 Jun 2023 14:17 )   PT: 12.10 sec;   INR: 1.06 ratio         PTT - ( 01 Jun 2023 14:17 )  PTT:32.4 sec      Troponin T, Serum: <0.01 ng/mL (06-01-23 @ 14:17)      CARDIAC MARKERS ( 01 Jun 2023 14:17 )  x     / <0.01 ng/mL / x     / x     / x          RADIOLOGY:

## 2023-06-02 LAB
A1C WITH ESTIMATED AVERAGE GLUCOSE RESULT: 5.6 % — SIGNIFICANT CHANGE UP (ref 4–5.6)
ALBUMIN SERPL ELPH-MCNC: 3.5 G/DL — SIGNIFICANT CHANGE UP (ref 3.5–5.2)
ALP SERPL-CCNC: 68 U/L — SIGNIFICANT CHANGE UP (ref 30–115)
ALT FLD-CCNC: <5 U/L — SIGNIFICANT CHANGE UP (ref 0–41)
ANION GAP SERPL CALC-SCNC: 16 MMOL/L — HIGH (ref 7–14)
AST SERPL-CCNC: 11 U/L — SIGNIFICANT CHANGE UP (ref 0–41)
BASOPHILS # BLD AUTO: 0.05 K/UL — SIGNIFICANT CHANGE UP (ref 0–0.2)
BASOPHILS NFR BLD AUTO: 0.2 % — SIGNIFICANT CHANGE UP (ref 0–1)
BILIRUB SERPL-MCNC: 1.5 MG/DL — HIGH (ref 0.2–1.2)
BUN SERPL-MCNC: 13 MG/DL — SIGNIFICANT CHANGE UP (ref 10–20)
CALCIUM SERPL-MCNC: 8.8 MG/DL — SIGNIFICANT CHANGE UP (ref 8.4–10.5)
CHLORIDE SERPL-SCNC: 102 MMOL/L — SIGNIFICANT CHANGE UP (ref 98–110)
CO2 SERPL-SCNC: 22 MMOL/L — SIGNIFICANT CHANGE UP (ref 17–32)
CREAT SERPL-MCNC: 1.2 MG/DL — SIGNIFICANT CHANGE UP (ref 0.7–1.5)
EGFR: 65 ML/MIN/1.73M2 — SIGNIFICANT CHANGE UP
EOSINOPHIL # BLD AUTO: 0.01 K/UL — SIGNIFICANT CHANGE UP (ref 0–0.7)
EOSINOPHIL NFR BLD AUTO: 0 % — SIGNIFICANT CHANGE UP (ref 0–8)
ESTIMATED AVERAGE GLUCOSE: 114 MG/DL — SIGNIFICANT CHANGE UP (ref 68–114)
GLUCOSE BLDC GLUCOMTR-MCNC: 154 MG/DL — HIGH (ref 70–99)
GLUCOSE BLDC GLUCOMTR-MCNC: 98 MG/DL — SIGNIFICANT CHANGE UP (ref 70–99)
GLUCOSE SERPL-MCNC: 82 MG/DL — SIGNIFICANT CHANGE UP (ref 70–99)
HCT VFR BLD CALC: 45 % — SIGNIFICANT CHANGE UP (ref 42–52)
HGB BLD-MCNC: 15.7 G/DL — SIGNIFICANT CHANGE UP (ref 14–18)
IMM GRANULOCYTES NFR BLD AUTO: 0.7 % — HIGH (ref 0.1–0.3)
LYMPHOCYTES # BLD AUTO: 0.96 K/UL — LOW (ref 1.2–3.4)
LYMPHOCYTES # BLD AUTO: 4.8 % — LOW (ref 20.5–51.1)
MCHC RBC-ENTMCNC: 33.3 PG — HIGH (ref 27–31)
MCHC RBC-ENTMCNC: 34.9 G/DL — SIGNIFICANT CHANGE UP (ref 32–37)
MCV RBC AUTO: 95.3 FL — HIGH (ref 80–94)
MONOCYTES # BLD AUTO: 1.34 K/UL — HIGH (ref 0.1–0.6)
MONOCYTES NFR BLD AUTO: 6.7 % — SIGNIFICANT CHANGE UP (ref 1.7–9.3)
NEUTROPHILS # BLD AUTO: 17.63 K/UL — HIGH (ref 1.4–6.5)
NEUTROPHILS NFR BLD AUTO: 87.6 % — HIGH (ref 42.2–75.2)
NRBC # BLD: 0 /100 WBCS — SIGNIFICANT CHANGE UP (ref 0–0)
PLATELET # BLD AUTO: 155 K/UL — SIGNIFICANT CHANGE UP (ref 130–400)
PMV BLD: 9.3 FL — SIGNIFICANT CHANGE UP (ref 7.4–10.4)
POTASSIUM SERPL-MCNC: 3.7 MMOL/L — SIGNIFICANT CHANGE UP (ref 3.5–5)
POTASSIUM SERPL-SCNC: 3.7 MMOL/L — SIGNIFICANT CHANGE UP (ref 3.5–5)
PROT SERPL-MCNC: 5.8 G/DL — LOW (ref 6–8)
RBC # BLD: 4.72 M/UL — SIGNIFICANT CHANGE UP (ref 4.7–6.1)
RBC # FLD: 11.3 % — LOW (ref 11.5–14.5)
SODIUM SERPL-SCNC: 140 MMOL/L — SIGNIFICANT CHANGE UP (ref 135–146)
VIT B12 SERPL-MCNC: 621 PG/ML — SIGNIFICANT CHANGE UP (ref 232–1245)
WBC # BLD: 20.14 K/UL — HIGH (ref 4.8–10.8)
WBC # FLD AUTO: 20.14 K/UL — HIGH (ref 4.8–10.8)

## 2023-06-02 PROCEDURE — 99222 1ST HOSP IP/OBS MODERATE 55: CPT

## 2023-06-02 PROCEDURE — 71045 X-RAY EXAM CHEST 1 VIEW: CPT | Mod: 26

## 2023-06-02 PROCEDURE — 99232 SBSQ HOSP IP/OBS MODERATE 35: CPT

## 2023-06-02 RX ORDER — ACETAMINOPHEN 500 MG
650 TABLET ORAL EVERY 6 HOURS
Refills: 0 | Status: DISCONTINUED | OUTPATIENT
Start: 2023-06-02 | End: 2023-06-05

## 2023-06-02 RX ORDER — RIVASTIGMINE 4.6 MG/24H
1 PATCH, EXTENDED RELEASE TRANSDERMAL EVERY 24 HOURS
Refills: 0 | Status: DISCONTINUED | OUTPATIENT
Start: 2023-06-02 | End: 2023-06-05

## 2023-06-02 RX ORDER — CARBAMIDE PEROXIDE 81.86 MG/ML
5 SOLUTION/ DROPS AURICULAR (OTIC)
Refills: 0 | Status: DISCONTINUED | OUTPATIENT
Start: 2023-06-02 | End: 2023-06-05

## 2023-06-02 RX ADMIN — Medication 100 MILLIGRAM(S): at 21:18

## 2023-06-02 RX ADMIN — CARBAMIDE PEROXIDE 5 DROP(S): 81.86 SOLUTION/ DROPS AURICULAR (OTIC) at 17:42

## 2023-06-02 RX ADMIN — ATORVASTATIN CALCIUM 40 MILLIGRAM(S): 80 TABLET, FILM COATED ORAL at 21:18

## 2023-06-02 RX ADMIN — LEVETIRACETAM 500 MILLIGRAM(S): 250 TABLET, FILM COATED ORAL at 06:15

## 2023-06-02 RX ADMIN — ENOXAPARIN SODIUM 40 MILLIGRAM(S): 100 INJECTION SUBCUTANEOUS at 21:19

## 2023-06-02 RX ADMIN — ENTACAPONE 200 MILLIGRAM(S): 200 TABLET, FILM COATED ORAL at 06:14

## 2023-06-02 RX ADMIN — ENTACAPONE 200 MILLIGRAM(S): 200 TABLET, FILM COATED ORAL at 12:38

## 2023-06-02 RX ADMIN — RIVASTIGMINE 1 PATCH: 4.6 PATCH, EXTENDED RELEASE TRANSDERMAL at 17:45

## 2023-06-02 RX ADMIN — CARBIDOPA AND LEVODOPA 2 TABLET(S): 25; 100 TABLET ORAL at 14:17

## 2023-06-02 RX ADMIN — ENTACAPONE 200 MILLIGRAM(S): 200 TABLET, FILM COATED ORAL at 18:01

## 2023-06-02 RX ADMIN — Medication 5 MILLIGRAM(S): at 21:18

## 2023-06-02 RX ADMIN — Medication 81 MILLIGRAM(S): at 12:39

## 2023-06-02 RX ADMIN — CARBIDOPA AND LEVODOPA 2 TABLET(S): 25; 100 TABLET ORAL at 06:14

## 2023-06-02 RX ADMIN — LEVETIRACETAM 500 MILLIGRAM(S): 250 TABLET, FILM COATED ORAL at 17:57

## 2023-06-02 RX ADMIN — Medication 650 MILLIGRAM(S): at 01:15

## 2023-06-02 RX ADMIN — ENTACAPONE 200 MILLIGRAM(S): 200 TABLET, FILM COATED ORAL at 23:04

## 2023-06-02 RX ADMIN — ENTACAPONE 200 MILLIGRAM(S): 200 TABLET, FILM COATED ORAL at 00:32

## 2023-06-02 RX ADMIN — Medication 650 MILLIGRAM(S): at 00:44

## 2023-06-02 RX ADMIN — CARBIDOPA AND LEVODOPA 2 TABLET(S): 25; 100 TABLET ORAL at 21:18

## 2023-06-02 NOTE — PROGRESS NOTE ADULT - ASSESSMENT
#Vertigo - persistent  #Gait instability d underlying Parkinson's disease   #sepsis POA, source not clear yet   H/o CVA   H/o PD   H/o Seizure d/o   DM II  HTN    PALNs:    - MRI neg, etiology is either from underlying infection VS hypoperfusion in setting of b/l vertebral stenosis VS DM neuropathy   - could be polypharmacy from ambien and trazodone  - send Bcx, UA, Ucx, RVP w covid, CXR  - start abx after culture sent   - hold coerg and lisnipril, target SBP > 140  -Fall precautions   -Orthostatic vitals   -Neurology follow up   -PT/Rehab for gait / balance eval and training

## 2023-06-02 NOTE — PROGRESS NOTE ADULT - ASSESSMENT
72 year old with PMH of HTN, parkinson's disease (diagnosed 6 years ago), Hx of ischemic CVA, DM, Hx of ?seizures on keppra who presented to the ED for evaluation of vertigo.      # Sepsis present on admission - Possible UTI   #Acute onset sustained vertigo  - No fall or LOC  - hematuria inpatient on 6/2   - DDx includes BPPV vs vestibular neuritis vs vascular causes (TIA or stroke) vs orthostasis (would not cause shani vertigo though)  - CTH 6/1: 1.  No evidence of acute intracranial pathology. Stable exam since 1/20/2023. 2.  Stable moderate/severe chronic microvascular changes and chronic lacunar infarcts.  - CT angio head and neck 6/1: 1.  No evidence of acute large vessel occlusion. Stable exam since 1/20/2023:2.  Stable scattered atheromatous changes including: mild stenoses of bilateral supraclinoid ICAs. moderate/severe stenoses of bilateral vertebral artery V4 segments. moderate stenosis of the right PCA P1/P2 segment. severe stenosis of the left PCA P4 segment  - Evaluated by neuro: Due to patient with nonfocal neurological deficits, negative CTH (despite sx of 4 days), stable CTA from previous CTA in january 2023, NIHSS 0 - less likely stroke. More likely peripheral etiology vs. orthostatic hypotension due to history of parkinson's     Plan:  - MRI brain without contrast to rule out stroke (requested by neuro)  - Orthostatics - due to hx of parkinson's, possibly etiology of sx (autonomic dysfunction)(would not cause shani vertigo though)  - ENT consult - started ear drops per ENT   - f/u cultures (urine and blood), CXR  - once cultures obtained will start abiox       #Hx of CVA  - Last MRI 1/21/23: There is no evidence of acute intracranial pathology. No evidence of acute infarct, intracranial hemorrhage or mass effect.Moderate chronic microvascular type changes as well as chronic lacunar infarcts as detailed.  - Not on statin as per Marquis BATRES, will resume here  - C/w aspirin     #Hx of seizures  - As per DC summary from neuro in Jan 2023:  In 2020 had an EEG with general slowing and he was started on Keppra.  - C/w Keppra    # HTN, stable  - C/w lisinopril   - C/w carvedilol    # DM II  - Last A1c 5.3 in Sep 2022  - hold home metformin  - Repeat A1c  - check fingersticks start insulin sliding scale if fsg>180    # Parkinson's disease  - c/w entacapone   - c/w cabidopa-levidopa   - will fill NF out for rivastigmine 9.5mg/24hr patch    # Insomnia   - c/w trazodone   - C/w [place] therapeutic interchange  - c/w melatonin     #DVT prophylaxis: Lovenox 40mg   #GI prophylaxis: not needed  #Diet: Dash, diabetic  #Activity: ambulate with assistance

## 2023-06-03 LAB
ALBUMIN SERPL ELPH-MCNC: 3.4 G/DL — LOW (ref 3.5–5.2)
ALP SERPL-CCNC: 64 U/L — SIGNIFICANT CHANGE UP (ref 30–115)
ALT FLD-CCNC: <5 U/L — SIGNIFICANT CHANGE UP (ref 0–41)
ANION GAP SERPL CALC-SCNC: 16 MMOL/L — HIGH (ref 7–14)
AST SERPL-CCNC: 14 U/L — SIGNIFICANT CHANGE UP (ref 0–41)
BILIRUB SERPL-MCNC: 1.2 MG/DL — SIGNIFICANT CHANGE UP (ref 0.2–1.2)
BUN SERPL-MCNC: 14 MG/DL — SIGNIFICANT CHANGE UP (ref 10–20)
CALCIUM SERPL-MCNC: 8.7 MG/DL — SIGNIFICANT CHANGE UP (ref 8.4–10.5)
CHLORIDE SERPL-SCNC: 104 MMOL/L — SIGNIFICANT CHANGE UP (ref 98–110)
CO2 SERPL-SCNC: 23 MMOL/L — SIGNIFICANT CHANGE UP (ref 17–32)
CREAT SERPL-MCNC: 1.2 MG/DL — SIGNIFICANT CHANGE UP (ref 0.7–1.5)
EGFR: 65 ML/MIN/1.73M2 — SIGNIFICANT CHANGE UP
GLUCOSE BLDC GLUCOMTR-MCNC: 130 MG/DL — HIGH (ref 70–99)
GLUCOSE BLDC GLUCOMTR-MCNC: 145 MG/DL — HIGH (ref 70–99)
GLUCOSE BLDC GLUCOMTR-MCNC: 96 MG/DL — SIGNIFICANT CHANGE UP (ref 70–99)
GLUCOSE BLDC GLUCOMTR-MCNC: 98 MG/DL — SIGNIFICANT CHANGE UP (ref 70–99)
GLUCOSE SERPL-MCNC: 84 MG/DL — SIGNIFICANT CHANGE UP (ref 70–99)
HCT VFR BLD CALC: 43.9 % — SIGNIFICANT CHANGE UP (ref 42–52)
HGB BLD-MCNC: 15.1 G/DL — SIGNIFICANT CHANGE UP (ref 14–18)
MAGNESIUM SERPL-MCNC: 1.7 MG/DL — LOW (ref 1.8–2.4)
MCHC RBC-ENTMCNC: 33.3 PG — HIGH (ref 27–31)
MCHC RBC-ENTMCNC: 34.4 G/DL — SIGNIFICANT CHANGE UP (ref 32–37)
MCV RBC AUTO: 96.7 FL — HIGH (ref 80–94)
NRBC # BLD: 0 /100 WBCS — SIGNIFICANT CHANGE UP (ref 0–0)
PLATELET # BLD AUTO: 161 K/UL — SIGNIFICANT CHANGE UP (ref 130–400)
PMV BLD: 9.4 FL — SIGNIFICANT CHANGE UP (ref 7.4–10.4)
POTASSIUM SERPL-MCNC: 4 MMOL/L — SIGNIFICANT CHANGE UP (ref 3.5–5)
POTASSIUM SERPL-SCNC: 4 MMOL/L — SIGNIFICANT CHANGE UP (ref 3.5–5)
PROT SERPL-MCNC: 6 G/DL — SIGNIFICANT CHANGE UP (ref 6–8)
RAPID RVP RESULT: SIGNIFICANT CHANGE UP
RBC # BLD: 4.54 M/UL — LOW (ref 4.7–6.1)
RBC # FLD: 11.5 % — SIGNIFICANT CHANGE UP (ref 11.5–14.5)
SARS-COV-2 RNA SPEC QL NAA+PROBE: SIGNIFICANT CHANGE UP
SODIUM SERPL-SCNC: 143 MMOL/L — SIGNIFICANT CHANGE UP (ref 135–146)
WBC # BLD: 10.18 K/UL — SIGNIFICANT CHANGE UP (ref 4.8–10.8)
WBC # FLD AUTO: 10.18 K/UL — SIGNIFICANT CHANGE UP (ref 4.8–10.8)

## 2023-06-03 PROCEDURE — 93970 EXTREMITY STUDY: CPT | Mod: 26

## 2023-06-03 PROCEDURE — 99232 SBSQ HOSP IP/OBS MODERATE 35: CPT

## 2023-06-03 RX ADMIN — ENOXAPARIN SODIUM 40 MILLIGRAM(S): 100 INJECTION SUBCUTANEOUS at 21:23

## 2023-06-03 RX ADMIN — LEVETIRACETAM 500 MILLIGRAM(S): 250 TABLET, FILM COATED ORAL at 18:19

## 2023-06-03 RX ADMIN — ENTACAPONE 200 MILLIGRAM(S): 200 TABLET, FILM COATED ORAL at 05:22

## 2023-06-03 RX ADMIN — ENTACAPONE 200 MILLIGRAM(S): 200 TABLET, FILM COATED ORAL at 18:18

## 2023-06-03 RX ADMIN — LEVETIRACETAM 500 MILLIGRAM(S): 250 TABLET, FILM COATED ORAL at 05:22

## 2023-06-03 RX ADMIN — RIVASTIGMINE 1 PATCH: 4.6 PATCH, EXTENDED RELEASE TRANSDERMAL at 06:43

## 2023-06-03 RX ADMIN — ENTACAPONE 200 MILLIGRAM(S): 200 TABLET, FILM COATED ORAL at 11:09

## 2023-06-03 RX ADMIN — CARBIDOPA AND LEVODOPA 2 TABLET(S): 25; 100 TABLET ORAL at 05:22

## 2023-06-03 RX ADMIN — Medication 5 MILLIGRAM(S): at 21:23

## 2023-06-03 RX ADMIN — Medication 81 MILLIGRAM(S): at 11:09

## 2023-06-03 RX ADMIN — CARBIDOPA AND LEVODOPA 2 TABLET(S): 25; 100 TABLET ORAL at 21:22

## 2023-06-03 RX ADMIN — CARBAMIDE PEROXIDE 5 DROP(S): 81.86 SOLUTION/ DROPS AURICULAR (OTIC) at 05:43

## 2023-06-03 RX ADMIN — RIVASTIGMINE 1 PATCH: 4.6 PATCH, EXTENDED RELEASE TRANSDERMAL at 18:15

## 2023-06-03 RX ADMIN — Medication 100 MILLIGRAM(S): at 21:22

## 2023-06-03 RX ADMIN — CARBAMIDE PEROXIDE 5 DROP(S): 81.86 SOLUTION/ DROPS AURICULAR (OTIC) at 18:17

## 2023-06-03 RX ADMIN — ATORVASTATIN CALCIUM 40 MILLIGRAM(S): 80 TABLET, FILM COATED ORAL at 21:22

## 2023-06-03 RX ADMIN — CARBIDOPA AND LEVODOPA 2 TABLET(S): 25; 100 TABLET ORAL at 13:20

## 2023-06-03 RX ADMIN — RIVASTIGMINE 1 PATCH: 4.6 PATCH, EXTENDED RELEASE TRANSDERMAL at 18:20

## 2023-06-03 NOTE — DIETITIAN INITIAL EVALUATION ADULT - NAME AND PHONE
Jeanette Castellanos RD [phone] or via Teams  Jeanette Castellanos, RD [phone] or via Teams     Patient is at moderate nutrition risk, RD to f/u in 5-7 days or PRN

## 2023-06-03 NOTE — DIETITIAN INITIAL EVALUATION ADULT - PERTINENT LABORATORY DATA
06-03    143  |  104  |  14  ----------------------------<  84  4.0   |  23  |  1.2    Ca    8.7      03 Jun 2023 07:23  Mg     1.7     06-03    TPro  6.0  /  Alb  3.4<L>  /  TBili  1.2  /  DBili  x   /  AST  14  /  ALT  <5  /  AlkPhos  64  06-03  POCT Blood Glucose.: 145 mg/dL (06-03-23 @ 11:22)  A1C with Estimated Average Glucose Result: 5.6 % (06-02-23 @ 05:41)  A1C with Estimated Average Glucose Result: 5.3 % (09-23-22 @ 08:11)

## 2023-06-03 NOTE — DIETITIAN INITIAL EVALUATION ADULT - PERTINENT MEDS FT
MEDICATIONS  (STANDING):  aspirin enteric coated 81 milliGRAM(s) Oral daily  atorvastatin 40 milliGRAM(s) Oral at bedtime  carbamide peroxide Otic Solution 5 Drop(s) Both Ears two times a day  carbidopa/levodopa  25/100 2 Tablet(s) Oral three times a day  enoxaparin Injectable 40 milliGRAM(s) SubCutaneous every 24 hours  entacapone 200 milliGRAM(s) Oral four times a day  levETIRAcetam 500 milliGRAM(s) Oral two times a day  levoFLOXacin IVPB 750 milliGRAM(s) IV Intermittent every 24 hours  levoFLOXacin IVPB      melatonin 5 milliGRAM(s) Oral at bedtime  rivastigmine patch  9.5 mG/24 Hr(s) 1 Patch Transdermal every 24 hours  traZODone 100 milliGRAM(s) Oral at bedtime    MEDICATIONS  (PRN):  acetaminophen     Tablet .. 650 milliGRAM(s) Oral every 6 hours PRN Temp greater or equal to 38C (100.4F), Moderate Pain (4 - 6)

## 2023-06-03 NOTE — DIETITIAN INITIAL EVALUATION ADULT - ORAL INTAKE PTA/DIET HISTORY
Patient reports having fair appetite and PO intake PTA. He was not following any particular diet. He reports taking a large amount of vitamins - but unable to recall which ones. No chewing/swallowing difficulties reported. UBW: 168 lbs. NKFA, no food intolerances.     This morning, patient was able to eat potato, pancakes a little eggs.

## 2023-06-03 NOTE — DIETITIAN INITIAL EVALUATION ADULT - NS FNS DIET ORDER
Diet, Regular:   Consistent Carbohydrate {Evening Snack}  DASH/TLC {Sodium & Cholesterol Restricted} (06-01-23 @ 19:18) [Active]

## 2023-06-03 NOTE — DIETITIAN INITIAL EVALUATION ADULT - EDUCATION DIETARY MODIFICATIONS
discussed importance of adequate kcal and protein intake; benefits of oral nutrition supplements/(1) partially meets; needs review/practice/verbalization

## 2023-06-03 NOTE — DIETITIAN INITIAL EVALUATION ADULT - NS FNS WEIGHT CHANGE REASON
Weight hx per EMR:     65.6 kg - 144.32 lbs (1/21/23)  81.6 kg - 179.52 lbs (9/22/22)    From 9/22/22 to 1/21/23, patient has 35 lb weight loss ---- then patient had a weight gain of 23 lbs from 1/21/23 to current admission/intentional

## 2023-06-03 NOTE — PROGRESS NOTE ADULT - ASSESSMENT
#Vertigo - persistent  #Gait instability d underlying Parkinson's disease   #sepsis POA, source not clear yet   hematuria  ?  H/o CVA   H/o PD   H/o Seizure d/o   DM II  HTN    PALNs:    - MRI neg, etiology is either from underlying infection VS hypoperfusion in setting of b/l vertebral stenosis VS DM neuropathy   - could be polypharmacy from ambien and trazodone  - send Bcx, UA, Ucx, RVP w covid, CXR  - start abx after culture sent   - hold coerg and lisnipril, target SBP > 140  -Fall precautions   -Orthostatic vitals   -Neurology follow up   -PT/Rehab for gait / balance eval and training    #Vertigo - persistent  #Gait instability d underlying Parkinson's disease   #sepsis POA, source not clear yet   hematuria  ?  H/o CVA   H/o PD   H/o Seizure d/o   DM II  HTN    PALNs:    - MRI neg, etiology is either from underlying infection VS hypoperfusion in setting of b/l vertebral stenosis VS DM neuropathy   - could be polypharmacy from Ambien and trazodone, hold Ambien   - pending Bcx, please send UA, Ucx,   - pending LE duplex   - on levaquin  - hold coerg and lisnipril, target SBP > 140  -Fall precautions   -Orthostatic vitals   -PT/Rehab for gait / balance eval and training

## 2023-06-03 NOTE — DIETITIAN INITIAL EVALUATION ADULT - OTHER INFO
Patient is 71 yo male with PMHx of HTN, Parkinson's disease (diagnosed 6 years ago). Hx of ischemic CVA, DM. Hx of ? seizures on keppra who presented to the ED for evaluation of vertigo. Patient with persistent vertigo; Gait instability due to underlying Parkinson's disease; Sepsis POA - source not clear yet

## 2023-06-03 NOTE — DIETITIAN INITIAL EVALUATION ADULT - OTHER CALCULATIONS
1503 - 1804 kcal/day (MSJ x 1.0 - 1.2 SF)   Estimated Needs with consideration for age, acuity of illness

## 2023-06-04 LAB
ALBUMIN SERPL ELPH-MCNC: 3.2 G/DL — LOW (ref 3.5–5.2)
ALP SERPL-CCNC: 60 U/L — SIGNIFICANT CHANGE UP (ref 30–115)
ALT FLD-CCNC: <5 U/L — SIGNIFICANT CHANGE UP (ref 0–41)
ANION GAP SERPL CALC-SCNC: 14 MMOL/L — SIGNIFICANT CHANGE UP (ref 7–14)
APPEARANCE UR: ABNORMAL
AST SERPL-CCNC: 14 U/L — SIGNIFICANT CHANGE UP (ref 0–41)
BACTERIA # UR AUTO: ABNORMAL
BILIRUB SERPL-MCNC: 0.8 MG/DL — SIGNIFICANT CHANGE UP (ref 0.2–1.2)
BILIRUB UR-MCNC: ABNORMAL
BUN SERPL-MCNC: 13 MG/DL — SIGNIFICANT CHANGE UP (ref 10–20)
CALCIUM SERPL-MCNC: 8.2 MG/DL — LOW (ref 8.4–10.5)
CHLORIDE SERPL-SCNC: 104 MMOL/L — SIGNIFICANT CHANGE UP (ref 98–110)
CO2 SERPL-SCNC: 23 MMOL/L — SIGNIFICANT CHANGE UP (ref 17–32)
COLOR SPEC: ABNORMAL
CREAT SERPL-MCNC: 1.1 MG/DL — SIGNIFICANT CHANGE UP (ref 0.7–1.5)
CULTURE RESULTS: SIGNIFICANT CHANGE UP
DIFF PNL FLD: ABNORMAL
EGFR: 72 ML/MIN/1.73M2 — SIGNIFICANT CHANGE UP
GLUCOSE BLDC GLUCOMTR-MCNC: 179 MG/DL — HIGH (ref 70–99)
GLUCOSE BLDC GLUCOMTR-MCNC: 72 MG/DL — SIGNIFICANT CHANGE UP (ref 70–99)
GLUCOSE SERPL-MCNC: 64 MG/DL — LOW (ref 70–99)
GLUCOSE UR QL: NEGATIVE — SIGNIFICANT CHANGE UP
HCT VFR BLD CALC: 41.4 % — LOW (ref 42–52)
HGB BLD-MCNC: 14.7 G/DL — SIGNIFICANT CHANGE UP (ref 14–18)
KETONES UR-MCNC: ABNORMAL
LEUKOCYTE ESTERASE UR-ACNC: ABNORMAL
MCHC RBC-ENTMCNC: 34.3 PG — HIGH (ref 27–31)
MCHC RBC-ENTMCNC: 35.5 G/DL — SIGNIFICANT CHANGE UP (ref 32–37)
MCV RBC AUTO: 96.7 FL — HIGH (ref 80–94)
NITRITE UR-MCNC: NEGATIVE — SIGNIFICANT CHANGE UP
NRBC # BLD: 0 /100 WBCS — SIGNIFICANT CHANGE UP (ref 0–0)
PH UR: 6 — SIGNIFICANT CHANGE UP (ref 5–8)
PLATELET # BLD AUTO: 156 K/UL — SIGNIFICANT CHANGE UP (ref 130–400)
PMV BLD: 9.1 FL — SIGNIFICANT CHANGE UP (ref 7.4–10.4)
POTASSIUM SERPL-MCNC: 4.2 MMOL/L — SIGNIFICANT CHANGE UP (ref 3.5–5)
POTASSIUM SERPL-SCNC: 4.2 MMOL/L — SIGNIFICANT CHANGE UP (ref 3.5–5)
PROT SERPL-MCNC: 5.7 G/DL — LOW (ref 6–8)
PROT UR-MCNC: NEGATIVE — SIGNIFICANT CHANGE UP
RBC # BLD: 4.28 M/UL — LOW (ref 4.7–6.1)
RBC # FLD: 11.5 % — SIGNIFICANT CHANGE UP (ref 11.5–14.5)
RBC CASTS # UR COMP ASSIST: 5 /HPF — HIGH (ref 0–4)
SODIUM SERPL-SCNC: 141 MMOL/L — SIGNIFICANT CHANGE UP (ref 135–146)
SP GR SPEC: 1.02 — SIGNIFICANT CHANGE UP (ref 1.01–1.03)
SPECIMEN SOURCE: SIGNIFICANT CHANGE UP
UROBILINOGEN FLD QL: SIGNIFICANT CHANGE UP
WBC # BLD: 8.31 K/UL — SIGNIFICANT CHANGE UP (ref 4.8–10.8)
WBC # FLD AUTO: 8.31 K/UL — SIGNIFICANT CHANGE UP (ref 4.8–10.8)
WBC UR QL: 25 /HPF — HIGH (ref 0–5)

## 2023-06-04 PROCEDURE — 99231 SBSQ HOSP IP/OBS SF/LOW 25: CPT

## 2023-06-04 RX ORDER — CIPROFLOXACIN LACTATE 400MG/40ML
500 VIAL (ML) INTRAVENOUS EVERY 12 HOURS
Refills: 0 | Status: DISCONTINUED | OUTPATIENT
Start: 2023-06-04 | End: 2023-06-05

## 2023-06-04 RX ADMIN — ENTACAPONE 200 MILLIGRAM(S): 200 TABLET, FILM COATED ORAL at 05:14

## 2023-06-04 RX ADMIN — RIVASTIGMINE 1 PATCH: 4.6 PATCH, EXTENDED RELEASE TRANSDERMAL at 07:58

## 2023-06-04 RX ADMIN — Medication 100 MILLIGRAM(S): at 21:26

## 2023-06-04 RX ADMIN — CARBAMIDE PEROXIDE 5 DROP(S): 81.86 SOLUTION/ DROPS AURICULAR (OTIC) at 05:15

## 2023-06-04 RX ADMIN — CARBIDOPA AND LEVODOPA 2 TABLET(S): 25; 100 TABLET ORAL at 14:22

## 2023-06-04 RX ADMIN — LEVETIRACETAM 500 MILLIGRAM(S): 250 TABLET, FILM COATED ORAL at 17:49

## 2023-06-04 RX ADMIN — CARBIDOPA AND LEVODOPA 2 TABLET(S): 25; 100 TABLET ORAL at 05:14

## 2023-06-04 RX ADMIN — CARBAMIDE PEROXIDE 5 DROP(S): 81.86 SOLUTION/ DROPS AURICULAR (OTIC) at 17:50

## 2023-06-04 RX ADMIN — Medication 500 MILLIGRAM(S): at 14:23

## 2023-06-04 RX ADMIN — Medication 81 MILLIGRAM(S): at 12:25

## 2023-06-04 RX ADMIN — LEVETIRACETAM 500 MILLIGRAM(S): 250 TABLET, FILM COATED ORAL at 05:14

## 2023-06-04 RX ADMIN — Medication 5 MILLIGRAM(S): at 21:26

## 2023-06-04 RX ADMIN — ATORVASTATIN CALCIUM 40 MILLIGRAM(S): 80 TABLET, FILM COATED ORAL at 21:26

## 2023-06-04 RX ADMIN — CARBIDOPA AND LEVODOPA 2 TABLET(S): 25; 100 TABLET ORAL at 21:26

## 2023-06-04 RX ADMIN — RIVASTIGMINE 1 PATCH: 4.6 PATCH, EXTENDED RELEASE TRANSDERMAL at 17:51

## 2023-06-04 RX ADMIN — ENOXAPARIN SODIUM 40 MILLIGRAM(S): 100 INJECTION SUBCUTANEOUS at 21:27

## 2023-06-04 RX ADMIN — ENTACAPONE 200 MILLIGRAM(S): 200 TABLET, FILM COATED ORAL at 23:35

## 2023-06-04 RX ADMIN — ENTACAPONE 200 MILLIGRAM(S): 200 TABLET, FILM COATED ORAL at 12:26

## 2023-06-04 RX ADMIN — ENTACAPONE 200 MILLIGRAM(S): 200 TABLET, FILM COATED ORAL at 17:50

## 2023-06-04 RX ADMIN — ENTACAPONE 200 MILLIGRAM(S): 200 TABLET, FILM COATED ORAL at 00:44

## 2023-06-04 NOTE — PHYSICAL THERAPY INITIAL EVALUATION ADULT - GENERAL OBSERVATIONS, REHAB EVAL
Pt seen from [id number]. Pt encountered in the bed, NAD, agreeable for b/s PT. Pt reported light headedness upon returning back to bed post amb. /91mmHg HR 74bpm, SPO2 94% on RA. MEDARDO Cohn made aware.

## 2023-06-04 NOTE — PHYSICAL THERAPY INITIAL EVALUATION ADULT - PERTINENT HX OF CURRENT PROBLEM, REHAB EVAL
72 year old with PMH of HTN, parkinson's disease (diagnosed 6 years ago), Hx of ischemic CVA, DM, Hx of ?seizures on keppra who presented to the ED for evaluation of vertigo.  Patient states that on monday after waking up, he started having a spinning sensation that has been going on/off until now. It is exacerbated by head movement and partially relieved by rest.  Associated with gait instability. Ambulated with cane +/- walker at baseline.

## 2023-06-04 NOTE — PROGRESS NOTE ADULT - ASSESSMENT
#Vertigo - resolved   #Gait instability due underlying Parkinson's disease   #sepsis POA, source not clear yet   hematuria  ? not real   H/o CVA   H/o PD   H/o Seizure d/o   DM II  HTN    PALNs:  - dizziness resolved,   - MRI neg, etiology is either from underlying infection VS hypoperfusion in setting of b/l vertebral stenosis VS DM neuropathy   - could be polypharmacy from Ambien and trazodone, off Ambien now  - neg Bcx, +ve UA, pending Ucx, but sent after abx started  - fever free > 24 hr  - neg LE duplex   - on levaquin, cont to 2 more days  - hold coerg and lisnipril, target SBP > 140 to prevent hypoperfusion in setting of b/l vertebral stenosis   -Fall precautions   -PT/Rehab for gait / balance eval and training   - DC per CM in AM

## 2023-06-04 NOTE — PROGRESS NOTE ADULT - SUBJECTIVE AND OBJECTIVE BOX
Patient is a 72y old  Male who presents with a chief complaint of Dizziness and giddiness     (2023 13:07)      OVERNIGHT EVENTS: remained stable, dizziness resolved      SUBJECTIVE / INTERVAL HPI: Patient seen and examined at bedside.     VITAL SIGNS:  Vital Signs Last 24 Hrs  T(C): 36.8 (2023 05:11), Max: 36.8 (2023 05:11)  T(F): 98.3 (2023 05:11), Max: 98.3 (2023 05:11)  HR: 59 (2023 05:11) (59 - 85)  BP: 141/73 (2023 05:11) (114/75 - 141/73)  BP(mean): --  RR: 19 (2023 05:11) (18 - 19)  SpO2: 97% (2023 05:11) (96% - 98%)    Parameters below as of 2023 05:11  Patient On (Oxygen Delivery Method): room air        PHYSICAL EXAM:    General: WDWN  HEENT: NC/AT; PERRL, clear conjunctiva  Neck: supple  Cardiovascular: +S1/S2; RRR  Respiratory: CTA b/l; no W/R/R  Gastrointestinal: soft, NT/ND; +BSx4  Extremities: WWP; 2+ peripheral pulses; no edema   Neurological: AAOx3; no focal deficits, no nystagmus     MEDICATIONS:  MEDICATIONS  (STANDING):  aspirin enteric coated 81 milliGRAM(s) Oral daily  atorvastatin 40 milliGRAM(s) Oral at bedtime  carbamide peroxide Otic Solution 5 Drop(s) Both Ears two times a day  carbidopa/levodopa  25/100 2 Tablet(s) Oral three times a day  enoxaparin Injectable 40 milliGRAM(s) SubCutaneous every 24 hours  entacapone 200 milliGRAM(s) Oral four times a day  levETIRAcetam 500 milliGRAM(s) Oral two times a day  levoFLOXacin IVPB      levoFLOXacin IVPB 750 milliGRAM(s) IV Intermittent every 24 hours  melatonin 5 milliGRAM(s) Oral at bedtime  rivastigmine patch  9.5 mG/24 Hr(s) 1 Patch Transdermal every 24 hours  traZODone 100 milliGRAM(s) Oral at bedtime    MEDICATIONS  (PRN):  acetaminophen     Tablet .. 650 milliGRAM(s) Oral every 6 hours PRN Temp greater or equal to 38C (100.4F), Moderate Pain (4 - 6)      ALLERGIES:  Allergies    sulfa drugs (Unknown)  penicillin (Unknown)  cefaclor (Unknown)    Intolerances        LABS:                        14.7   8.31  )-----------( 156      ( 2023 07:04 )             41.4     06-04    141  |  104  |  13  ----------------------------<  64<L>  4.2   |  23  |  1.1    Ca    8.2<L>      2023 07:04  Mg     1.7     06-03    TPro  5.7<L>  /  Alb  3.2<L>  /  TBili  0.8  /  DBili  x   /  AST  14  /  ALT  <5  /  AlkPhos  60  06-04      Urinalysis Basic - ( 2023 07:10 )    Color: Orange / Appearance: Slightly Turbid / S.020 / pH: x  Gluc: x / Ketone: Small  / Bili: Moderate / Urobili: <2 mg/dL   Blood: x / Protein: Negative / Nitrite: Negative   Leuk Esterase: Small / RBC: 5 /HPF / WBC 25 /HPF   Sq Epi: x / Non Sq Epi: x / Bacteria: Occasional      CAPILLARY BLOOD GLUCOSE      POCT Blood Glucose.: 72 mg/dL (2023 08:02)      RADIOLOGY & ADDITIONAL TESTS: Reviewed.    ASSESSMENT:    PLAN: 
Patient is a 72y old  Male who presents with a chief complaint of     OVERNIGHT EVENTS: had fever overnight, c/o dizziness  denies fever, chills, syncope, seizure, headache, cough, SOB, abd pain, N/V/D, urinary symptoms, legs swelling,     SUBJECTIVE / INTERVAL HPI: Patient seen and examined at bedside.     VITAL SIGNS:  Vital Signs Last 24 Hrs  T(C): 37.1 (02 Jun 2023 04:00), Max: 38.8 (02 Jun 2023 00:10)  T(F): 98.7 (02 Jun 2023 04:00), Max: 101.8 (02 Jun 2023 00:10)  HR: 108 (02 Jun 2023 04:00) (60 - 108)  BP: 85/56 (02 Jun 2023 04:00) (85/56 - 172/91)  BP(mean): --  RR: 16 (01 Jun 2023 21:05) (16 - 18)  SpO2: 92% (02 Jun 2023 04:00) (92% - 99%)    Parameters below as of 01 Jun 2023 21:05  Patient On (Oxygen Delivery Method): room air        PHYSICAL EXAM:    General: WDWN  HEENT: NC/AT; PERRL, clear conjunctiva  Neck: supple  Cardiovascular: +S1/S2; RRR  Respiratory: CTA b/l; no W/R/R  Gastrointestinal: soft, NT/ND; +BSx4  Extremities: WWP; 2+ peripheral pulses; no edema   Neurological: AAOx3; no focal deficits, no nystagmus     MEDICATIONS:  MEDICATIONS  (STANDING):  aspirin enteric coated 81 milliGRAM(s) Oral daily  atorvastatin 40 milliGRAM(s) Oral at bedtime  carbamide peroxide Otic Solution 5 Drop(s) Both Ears two times a day  carbidopa/levodopa  25/100 2 Tablet(s) Oral three times a day  enoxaparin Injectable 40 milliGRAM(s) SubCutaneous every 24 hours  entacapone 200 milliGRAM(s) Oral four times a day  levETIRAcetam 500 milliGRAM(s) Oral two times a day  lisinopril 40 milliGRAM(s) Oral daily  melatonin 5 milliGRAM(s) Oral at bedtime  traZODone 100 milliGRAM(s) Oral at bedtime  zolpidem 5 milliGRAM(s) Oral at bedtime    MEDICATIONS  (PRN):  acetaminophen     Tablet .. 650 milliGRAM(s) Oral every 6 hours PRN Temp greater or equal to 38C (100.4F), Moderate Pain (4 - 6)      ALLERGIES:  Allergies    sulfa drugs (Unknown)  penicillin (Unknown)  cefaclor (Unknown)    Intolerances        LABS:                        15.7   20.14 )-----------( 155      ( 02 Jun 2023 05:41 )             45.0     06-02    140  |  102  |  13  ----------------------------<  82  3.7   |  22  |  1.2    Ca    8.8      02 Jun 2023 05:41    TPro  5.8<L>  /  Alb  3.5  /  TBili  1.5<H>  /  DBili  x   /  AST  11  /  ALT  <5  /  AlkPhos  68  06-02    PT/INR - ( 01 Jun 2023 14:17 )   PT: 12.10 sec;   INR: 1.06 ratio         PTT - ( 01 Jun 2023 14:17 )  PTT:32.4 sec    CAPILLARY BLOOD GLUCOSE      POCT Blood Glucose.: 129 mg/dL (01 Jun 2023 13:53)      RADIOLOGY & ADDITIONAL TESTS: Reviewed.    ASSESSMENT:    PLAN:     < from: MR Head No Cont (06.01.23 @ 23:25) >  IMPRESSION:  No acute infarct, acute intracranial hemorrhage, or mass effect.  Superimposed preferential cortical atrophy of the bilateral frontal and   temporal lobes. Correlate clinically for possible dementia.    --- End of Report --    < end of copied text >  < from: CT Angio Head w/ IV Cont (06.01.23 @ 15:01) >  2.  Stable scattered atheromatous changes including:  -mild stenoses of bilateral supraclinoid ICAs  -moderate/severe stenoses of bilateral vertebral artery V4 segments  -moderate stenosis of the right PCA P1/P2 segment  -severe stenosis of the left PCA P4 segment    < end of copied text >  
ZAKIA MILLS 72y Male  MRN#: [id number]   Hospital Day: 1d    SUBJECTIVE  Patient is a 72y old Male who presents with a chief complaint of Currently admitted to medicine with the primary diagnosis of Vertigo      INTERVAL HPI AND OVERNIGHT EVENTS:  Patient was examined and seen at bedside. This morning he is resting comfortably in bed and reports no issues or overnight events.      OBJECTIVE  PAST MEDICAL & SURGICAL HISTORY  Parkinson disease    CVA (cerebral vascular accident)    HTN (hypertension)    Diabetes mellitus    Gout    History of appendectomy      ALLERGIES:  sulfa drugs (Unknown)  penicillin (Unknown)  cefaclor (Unknown)    MEDICATIONS:  STANDING MEDICATIONS  aspirin enteric coated 81 milliGRAM(s) Oral daily  atorvastatin 40 milliGRAM(s) Oral at bedtime  carbamide peroxide Otic Solution 5 Drop(s) Both Ears two times a day  carbidopa/levodopa  25/100 2 Tablet(s) Oral three times a day  enoxaparin Injectable 40 milliGRAM(s) SubCutaneous every 24 hours  entacapone 200 milliGRAM(s) Oral four times a day  levETIRAcetam 500 milliGRAM(s) Oral two times a day  lisinopril 40 milliGRAM(s) Oral daily  melatonin 5 milliGRAM(s) Oral at bedtime  traZODone 100 milliGRAM(s) Oral at bedtime  zolpidem 5 milliGRAM(s) Oral at bedtime    PRN MEDICATIONS  acetaminophen     Tablet .. 650 milliGRAM(s) Oral every 6 hours PRN      PHYSICAL EXAM:  CONSTITUTIONAL: No acute distress, well-developed, well-groomed, AAOx3  HEAD: Atraumatic, normocephalic  PULMONARY: Clear to auscultation bilaterally; no wheezes, rales, or rhonchi  CARDIOVASCULAR: Regular rate and rhythm; + systolic murmur   GASTROINTESTINAL: Soft, non-tender, non-distended; bowel sounds present  MUSCULOSKELETAL: ; no clubbing, no cyanosis, no edema  NEUROLOGY: non-focal  SKIN: No rashes or lesions; warm and dry    VITAL SIGNS: Last 24 Hours  T(C): 37.1 (02 Jun 2023 04:00), Max: 38.8 (02 Jun 2023 00:10)  T(F): 98.7 (02 Jun 2023 04:00), Max: 101.8 (02 Jun 2023 00:10)  HR: 108 (02 Jun 2023 04:00) (60 - 108)  BP: 85/56 (02 Jun 2023 04:00) (85/56 - 172/91)  BP(mean): --  RR: 16 (01 Jun 2023 21:05) (16 - 18)  SpO2: 92% (02 Jun 2023 04:00) (92% - 99%)    LABS:                        15.7   20.14 )-----------( 155      ( 02 Jun 2023 05:41 )             45.0     06-02    140  |  102  |  13  ----------------------------<  82  3.7   |  22  |  1.2    Ca    8.8      02 Jun 2023 05:41    TPro  5.8<L>  /  Alb  3.5  /  TBili  1.5<H>  /  DBili  x   /  AST  11  /  ALT  <5  /  AlkPhos  68  06-02    PT/INR - ( 01 Jun 2023 14:17 )   PT: 12.10 sec;   INR: 1.06 ratio         PTT - ( 01 Jun 2023 14:17 )  PTT:32.4 sec      Troponin T, Serum: <0.01 ng/mL (06-01-23 @ 14:17)      CARDIAC MARKERS ( 01 Jun 2023 14:17 )  x     / <0.01 ng/mL / x     / x     / x      
Patient is a 72y old  Male who presents with a chief complaint of     OVERNIGHT EVENTS: report improvement of dizziness     SUBJECTIVE / INTERVAL HPI: Patient seen and examined at bedside.     VITAL SIGNS:  Vital Signs Last 24 Hrs  T(C): 36.7 (03 Jun 2023 05:00), Max: 36.7 (02 Jun 2023 20:30)  T(F): 98.1 (03 Jun 2023 05:00), Max: 98.1 (03 Jun 2023 05:00)  HR: 81 (03 Jun 2023 05:00) (81 - 100)  BP: 110/72 (03 Jun 2023 05:00) (110/72 - 136/97)  BP(mean): --  RR: 18 (03 Jun 2023 05:00) (18 - 18)  SpO2: --        PHYSICAL EXAM:    General: WDWN  HEENT: NC/AT; PERRL, clear conjunctiva  Neck: supple  Cardiovascular: +S1/S2; RRR  Respiratory: CTA b/l; no W/R/R  Gastrointestinal: soft, NT/ND; +BSx4  Extremities: WWP; 2+ peripheral pulses; no edema   Neurological: AAOx3; no focal deficits, no nystagmus       MEDICATIONS:  MEDICATIONS  (STANDING):  aspirin enteric coated 81 milliGRAM(s) Oral daily  atorvastatin 40 milliGRAM(s) Oral at bedtime  carbamide peroxide Otic Solution 5 Drop(s) Both Ears two times a day  carbidopa/levodopa  25/100 2 Tablet(s) Oral three times a day  enoxaparin Injectable 40 milliGRAM(s) SubCutaneous every 24 hours  entacapone 200 milliGRAM(s) Oral four times a day  levETIRAcetam 500 milliGRAM(s) Oral two times a day  levoFLOXacin IVPB 750 milliGRAM(s) IV Intermittent every 24 hours  levoFLOXacin IVPB      melatonin 5 milliGRAM(s) Oral at bedtime  rivastigmine patch  9.5 mG/24 Hr(s) 1 Patch Transdermal every 24 hours  traZODone 100 milliGRAM(s) Oral at bedtime    MEDICATIONS  (PRN):  acetaminophen     Tablet .. 650 milliGRAM(s) Oral every 6 hours PRN Temp greater or equal to 38C (100.4F), Moderate Pain (4 - 6)      ALLERGIES:  Allergies    sulfa drugs (Unknown)  penicillin (Unknown)  cefaclor (Unknown)    Intolerances        LABS:                        15.1   10.18 )-----------( 161      ( 03 Jun 2023 07:23 )             43.9     06-03    143  |  104  |  14  ----------------------------<  84  4.0   |  23  |  1.2    Ca    8.7      03 Jun 2023 07:23  Mg     1.7     06-03    TPro  6.0  /  Alb  3.4<L>  /  TBili  1.2  /  DBili  x   /  AST  14  /  ALT  <5  /  AlkPhos  64  06-03    PT/INR - ( 01 Jun 2023 14:17 )   PT: 12.10 sec;   INR: 1.06 ratio         PTT - ( 01 Jun 2023 14:17 )  PTT:32.4 sec    CAPILLARY BLOOD GLUCOSE      POCT Blood Glucose.: 145 mg/dL (03 Jun 2023 11:22)      RADIOLOGY & ADDITIONAL TESTS: Reviewed.    ASSESSMENT:    PLAN:

## 2023-06-05 ENCOUNTER — TRANSCRIPTION ENCOUNTER (OUTPATIENT)
Age: 73
End: 2023-06-05

## 2023-06-05 VITALS
TEMPERATURE: 97 F | HEART RATE: 61 BPM | OXYGEN SATURATION: 96 % | SYSTOLIC BLOOD PRESSURE: 119 MMHG | DIASTOLIC BLOOD PRESSURE: 71 MMHG

## 2023-06-05 LAB
ALBUMIN SERPL ELPH-MCNC: 3.1 G/DL — LOW (ref 3.5–5.2)
ALP SERPL-CCNC: 68 U/L — SIGNIFICANT CHANGE UP (ref 30–115)
ALT FLD-CCNC: <5 U/L — SIGNIFICANT CHANGE UP (ref 0–41)
ANION GAP SERPL CALC-SCNC: 10 MMOL/L — SIGNIFICANT CHANGE UP (ref 7–14)
AST SERPL-CCNC: 14 U/L — SIGNIFICANT CHANGE UP (ref 0–41)
BILIRUB SERPL-MCNC: 0.5 MG/DL — SIGNIFICANT CHANGE UP (ref 0.2–1.2)
BUN SERPL-MCNC: 16 MG/DL — SIGNIFICANT CHANGE UP (ref 10–20)
CALCIUM SERPL-MCNC: 8.4 MG/DL — SIGNIFICANT CHANGE UP (ref 8.4–10.5)
CHLORIDE SERPL-SCNC: 111 MMOL/L — HIGH (ref 98–110)
CO2 SERPL-SCNC: 23 MMOL/L — SIGNIFICANT CHANGE UP (ref 17–32)
CREAT SERPL-MCNC: 1.1 MG/DL — SIGNIFICANT CHANGE UP (ref 0.7–1.5)
EGFR: 72 ML/MIN/1.73M2 — SIGNIFICANT CHANGE UP
GLUCOSE BLDC GLUCOMTR-MCNC: 111 MG/DL — HIGH (ref 70–99)
GLUCOSE BLDC GLUCOMTR-MCNC: 133 MG/DL — HIGH (ref 70–99)
GLUCOSE SERPL-MCNC: 104 MG/DL — HIGH (ref 70–99)
HCT VFR BLD CALC: 42.4 % — SIGNIFICANT CHANGE UP (ref 42–52)
HGB BLD-MCNC: 14.7 G/DL — SIGNIFICANT CHANGE UP (ref 14–18)
LEVETIRACETAM SERPL-MCNC: 8.5 UG/ML — LOW (ref 10–40)
MAGNESIUM SERPL-MCNC: 1.8 MG/DL — SIGNIFICANT CHANGE UP (ref 1.8–2.4)
MCHC RBC-ENTMCNC: 33.3 PG — HIGH (ref 27–31)
MCHC RBC-ENTMCNC: 34.7 G/DL — SIGNIFICANT CHANGE UP (ref 32–37)
MCV RBC AUTO: 96.1 FL — HIGH (ref 80–94)
NRBC # BLD: 0 /100 WBCS — SIGNIFICANT CHANGE UP (ref 0–0)
PLATELET # BLD AUTO: 158 K/UL — SIGNIFICANT CHANGE UP (ref 130–400)
PMV BLD: 9.7 FL — SIGNIFICANT CHANGE UP (ref 7.4–10.4)
POTASSIUM SERPL-MCNC: 4.4 MMOL/L — SIGNIFICANT CHANGE UP (ref 3.5–5)
POTASSIUM SERPL-SCNC: 4.4 MMOL/L — SIGNIFICANT CHANGE UP (ref 3.5–5)
PROT SERPL-MCNC: 5.7 G/DL — LOW (ref 6–8)
RBC # BLD: 4.41 M/UL — LOW (ref 4.7–6.1)
RBC # FLD: 11.5 % — SIGNIFICANT CHANGE UP (ref 11.5–14.5)
SODIUM SERPL-SCNC: 144 MMOL/L — SIGNIFICANT CHANGE UP (ref 135–146)
WBC # BLD: 7.7 K/UL — SIGNIFICANT CHANGE UP (ref 4.8–10.8)
WBC # FLD AUTO: 7.7 K/UL — SIGNIFICANT CHANGE UP (ref 4.8–10.8)

## 2023-06-05 PROCEDURE — 99239 HOSP IP/OBS DSCHRG MGMT >30: CPT

## 2023-06-05 RX ORDER — CIPROFLOXACIN LACTATE 400MG/40ML
1 VIAL (ML) INTRAVENOUS
Qty: 4 | Refills: 0
Start: 2023-06-05 | End: 2023-06-06

## 2023-06-05 RX ADMIN — CARBIDOPA AND LEVODOPA 2 TABLET(S): 25; 100 TABLET ORAL at 05:36

## 2023-06-05 RX ADMIN — ENTACAPONE 200 MILLIGRAM(S): 200 TABLET, FILM COATED ORAL at 05:36

## 2023-06-05 RX ADMIN — ENTACAPONE 200 MILLIGRAM(S): 200 TABLET, FILM COATED ORAL at 11:57

## 2023-06-05 RX ADMIN — LEVETIRACETAM 500 MILLIGRAM(S): 250 TABLET, FILM COATED ORAL at 05:37

## 2023-06-05 RX ADMIN — Medication 500 MILLIGRAM(S): at 04:55

## 2023-06-05 RX ADMIN — Medication 81 MILLIGRAM(S): at 11:57

## 2023-06-05 NOTE — DISCHARGE NOTE PROVIDER - CARE PROVIDER_API CALL
Ha Tejada  Internal Medicine  [address] [address]  Phone: [phone]  Fax: [phone]  Follow Up Time:    Ha Tejada  Internal Medicine  [address] [facility] [address]  [place] [address]  Phone: [phone]  Fax: [phone]  Follow Up Time:     Seferino Simmons  Urology  [address] [place] [address], [address] [address]  Phone: [phone]  Fax: [phone]  Follow Up Time: 2 weeks

## 2023-06-05 NOTE — DISCHARGE NOTE PROVIDER - NSDCMRMEDTOKEN_GEN_ALL_CORE_FT
aspirin 81 mg oral delayed release tablet: 1 tab(s) orally once a day  atorvastatin 40 mg oral tablet: 1 tab(s) orally once a day  carbidopa-levodopa 25 mg-100 mg oral tablet: 2 tab(s) orally 3 times a day  Cipro 500 mg oral tablet: 1 tab(s) orally every 12 hours  entacapone 200 mg oral tablet: 1 tab(s) orally 4 times a day with sinemet  levETIRAcetam 500 mg oral tablet: 1 tab(s) orally 2 times a day  Lunesta 1 mg oral tablet: 1 tab(s) orally once a day (at bedtime)  Melatonin 10 mg oral capsule: 1 cap(s) orally once a day (at bedtime)  metFORMIN 500 mg oral tablet, extended release: 1 tab(s) orally once a day (at bedtime)  rivastigmine 9.5 mg/24 hr transdermal film, extended release: 1 patch transdermal every 24 hours  traZODone 100 mg oral tablet: 1 tab(s) orally once a day

## 2023-06-05 NOTE — DISCHARGE NOTE NURSING/CASE MANAGEMENT/SOCIAL WORK - PATIENT PORTAL LINK FT
You can access the FollowMyHealth Patient Portal offered by [facility] by registering at the following website: [email or web address]. By joining [facility]’s FollowMyHealth portal, you will also be able to view your health information using other applications (apps) compatible with our system.

## 2023-06-05 NOTE — DISCHARGE NOTE PROVIDER - HOSPITAL COURSE
72 year old with PMH of HTN, parkinson's disease (diagnosed 6 years ago), Hx of ischemic CVA, DM, Hx of ?seizures on keppra who presented to the ED for evaluation of vertigo.    Patient states that on monday after waking up, he started having a spinning sensation that has been going on/off until now. It is exacerbated by head movement and partially relieved by rest.  Associated with gait instability. Ambulated with cane +/- walker at baseline.    No recent upper respiratory tract infection. no hearing loss, no headache, no fever, no muscle weakness, no vision changes.    In the ED,  CTH was done:  CTH 6/1: 1.  No evidence of acute intracranial pathology. Stable exam since 1/20/2023. 2.  Stable moderate/severe chronic microvascular changes and chronic lacunar infarcts.  - CT angio head and neck 6/1: 1.  No evidence of acute large vessel occlusion. Stable exam since 1/20/2023:2.  Stable scattered atheromatous changes including: mild stenoses of bilateral supraclinoid ICAs. moderate/severe stenoses of bilateral vertebral artery V4 segments. moderate stenosis of the right PCA P1/P2 segment. severe stenosis of the left PCA P4 segment    - Evaluated by neuro: Due to patient with nonfocal neurological deficits, negative CTH (despite sx of 4 days), stable CTA from previous CTA in january 2023, NIHSS 0 - less likely stroke. More likely peripheral etiology vs. orthostatic hypotension due to history of parkinson's.    Hospital Course:  - MRI done which was negative for acute path, concern remains for etiology of either underlying infection VS hypoperfusion in setting of b/l vertebral stenosis VS DM neuropathy   - could be polypharmacy from ambien and trazodone; ambien will be held  - holding coureg and lisinopril for permissive HTN SBP > 140  - Bcx, Ucx, RVP negative  -Fall precautions   -Orthostatic vitals   - PT seen and eval'd by PT  - dizziness resolved by HD 3  - started empirically on levaquin, transitioned to PO cipro, will cont to 2 more days    PT is HD stable, motivated to return to [facility] assisted living; will be d/c'd on 2 days of PO ABx; pt understands plan, questions answered.     72 year old with PMH of HTN, parkinson's disease (diagnosed 6 years ago), Hx of ischemic CVA, DM, Hx of ?seizures on keppra who presented to the ED for evaluation of vertigo.    Patient states that on monday after waking up, he started having a spinning sensation that has been going on/off until now. It is exacerbated by head movement and partially relieved by rest.  Associated with gait instability. Ambulated with cane +/- walker at baseline.    No recent upper respiratory tract infection. no hearing loss, no headache, no fever, no muscle weakness, no vision changes.    In the ED,  CTH was done:  CTH 6/1: 1.  No evidence of acute intracranial pathology. Stable exam since 1/20/2023. 2.  Stable moderate/severe chronic microvascular changes and chronic lacunar infarcts.  - CT angio head and neck 6/1: 1.  No evidence of acute large vessel occlusion. Stable exam since 1/20/2023:2.  Stable scattered atheromatous changes including: mild stenoses of bilateral supraclinoid ICAs. moderate/severe stenoses of bilateral vertebral artery V4 segments. moderate stenosis of the right PCA P1/P2 segment. severe stenosis of the left PCA P4 segment    - Evaluated by neuro: Due to patient with nonfocal neurological deficits, negative CTH (despite sx of 4 days), stable CTA from previous CTA in january 2023, NIHSS 0 - less likely stroke. More likely peripheral etiology vs. orthostatic hypotension due to history of parkinson's.    Hospital Course:  - MRI done which was negative for acute path, concern remains for etiology of either underlying infection VS hypoperfusion in setting of b/l vertebral stenosis VS DM neuropathy   - could be polypharmacy from ambien and trazodone; ambien will be held  - holding coureg and lisinopril for permissive HTN SBP > 140  - Bcx, Ucx, RVP negative  -Fall precautions   - PT seen and eval'd by PT  - dizziness resolved   - started empirically on levaquin, transitioned to PO cipro, will cont to 2 more days for suspected UTI  #noted micrscopic hematuria on UA,  need evaluation by Urology as outpatient   PT is HD stable, motivated to return to [facility] assisted living; will be d/c'd on 2 days of PO ABx; pt understands plan, questions answered.

## 2023-06-05 NOTE — DISCHARGE NOTE NURSING/CASE MANAGEMENT/SOCIAL WORK - NSDCVIVACCINE_GEN_ALL_CORE_FT
Tdap; 12-May-2020 22:10; Angelina Gomez (RN); [employer]; [id number] (Exp. Date: 19-Apr-2021); IntraMuscular; Deltoid Right.; 0.5 milliLiter(s); VIS (VIS Published: 09-May-2013, VIS Presented: 12-May-2020);

## 2023-06-05 NOTE — DISCHARGE NOTE PROVIDER - PROVIDER TOKENS
PROVIDER:[TOKEN:[[id number]]] PROVIDER:[TOKEN:[[id number]]],PROVIDER:[TOKEN:[[id number]],FOLLOWUP:[2 weeks]]

## 2023-06-05 NOTE — DISCHARGE NOTE PROVIDER - NSDCCPCAREPLAN_GEN_ALL_CORE_FT
PRINCIPAL DISCHARGE DIAGNOSIS  Diagnosis: Vertigo  Assessment and Plan of Treatment: You presented with lightheadedness/dizziness; your symptoms resolved while you were here. The exact reason for your dizziness is not clear - we held your ambien and we don't recomend continuing this on discharge; we also discontinued some of your blood presure medications to keep your blood pressure higher. Even though there were no overt signs of infection we started you on antibiotics as well, you have two days left of antibiotics and they have been sent to your pharmacy. It is important that you follow up with your neurologist and PCP within 2 weeks of discharge; If your symptoms return, if you have any vision changes, if you develop fevers please seek urgent medical care.     PRINCIPAL DISCHARGE DIAGNOSIS  Diagnosis: Vertigo  Assessment and Plan of Treatment: You presented with lightheadedness/dizziness; your symptoms resolved while you were here. The exact reason for your dizziness is not clear - we held your ambien and we don't recomend continuing this on discharge; we also discontinued some of your blood presure medications to keep your blood pressure higher. Even though there were no overt signs of infection we started you on antibiotics as well, you have two days left of antibiotics and they have been sent to your pharmacy. It is important that you follow up with your neurologist and PCP within 2 weeks of discharge; If your symptoms return, if you have any vision changes, if you develop fevers please seek urgent medical care.      SECONDARY DISCHARGE DIAGNOSES  Diagnosis: Hematuria  Assessment and Plan of Treatment: noted micrscopic hematuria, need evaluation by Urology as outpatient

## 2023-06-05 NOTE — DISCHARGE NOTE NURSING/CASE MANAGEMENT/SOCIAL WORK - NSDCPEFALRISK_GEN_ALL_CORE
For information on Fall & Injury Prevention, visit: [email or web address] OR  [email or web address] OR  https://www.cdc.gov/steadi/patient.html

## 2023-06-07 LAB
CULTURE RESULTS: SIGNIFICANT CHANGE UP
CULTURE RESULTS: SIGNIFICANT CHANGE UP
SPECIMEN SOURCE: SIGNIFICANT CHANGE UP
SPECIMEN SOURCE: SIGNIFICANT CHANGE UP

## 2023-06-09 DIAGNOSIS — F02.80 DEMENTIA IN OTHER DISEASES CLASSIFIED ELSEWHERE, UNSPECIFIED SEVERITY, WITHOUT BEHAVIORAL DISTURBANCE, PSYCHOTIC DISTURBANCE, MOOD DISTURBANCE, AND ANXIETY: ICD-10-CM

## 2023-06-09 DIAGNOSIS — R26.81 UNSTEADINESS ON FEET: ICD-10-CM

## 2023-06-09 DIAGNOSIS — G81.94 HEMIPLEGIA, UNSPECIFIED AFFECTING LEFT NONDOMINANT SIDE: ICD-10-CM

## 2023-06-09 DIAGNOSIS — G40.909 EPILEPSY, UNSPECIFIED, NOT INTRACTABLE, WITHOUT STATUS EPILEPTICUS: ICD-10-CM

## 2023-06-09 DIAGNOSIS — G47.00 INSOMNIA, UNSPECIFIED: ICD-10-CM

## 2023-06-09 DIAGNOSIS — A41.9 SEPSIS, UNSPECIFIED ORGANISM: ICD-10-CM

## 2023-06-09 DIAGNOSIS — Z79.82 LONG TERM (CURRENT) USE OF ASPIRIN: ICD-10-CM

## 2023-06-09 DIAGNOSIS — G20 PARKINSON'S DISEASE: ICD-10-CM

## 2023-06-09 DIAGNOSIS — N39.0 URINARY TRACT INFECTION, SITE NOT SPECIFIED: ICD-10-CM

## 2023-06-09 DIAGNOSIS — I95.1 ORTHOSTATIC HYPOTENSION: ICD-10-CM

## 2023-06-09 DIAGNOSIS — Z88.0 ALLERGY STATUS TO PENICILLIN: ICD-10-CM

## 2023-06-09 DIAGNOSIS — I10 ESSENTIAL (PRIMARY) HYPERTENSION: ICD-10-CM

## 2023-06-09 DIAGNOSIS — Z79.84 LONG TERM (CURRENT) USE OF ORAL HYPOGLYCEMIC DRUGS: ICD-10-CM

## 2023-06-09 DIAGNOSIS — Z88.2 ALLERGY STATUS TO SULFONAMIDES: ICD-10-CM

## 2023-06-09 DIAGNOSIS — Z88.1 ALLERGY STATUS TO OTHER ANTIBIOTIC AGENTS STATUS: ICD-10-CM

## 2023-06-09 DIAGNOSIS — E11.40 TYPE 2 DIABETES MELLITUS WITH DIABETIC NEUROPATHY, UNSPECIFIED: ICD-10-CM

## 2023-06-09 DIAGNOSIS — I65.03 OCCLUSION AND STENOSIS OF BILATERAL VERTEBRAL ARTERIES: ICD-10-CM

## 2023-06-26 NOTE — PROGRESS NOTE ADULT - SUBJECTIVE AND OBJECTIVE BOX
Patient arrived to room. PIV placed. Admit assessment completed. Plan of care discussed with patient.   Patient is a 67y old  Male who presents with a chief complaint of f/p fall and Lt knee pain and swelling (2018 01:17)      Overnight events:   No acute event overnight. Patient reports that he is still having left knee pain.   Septic arthritis ruled out.       PAST MEDICAL & SURGICAL HISTORY:  Gout  Diabetes mellitus  HTN (hypertension)  CVA (cerebral vascular accident)  Parkinson disease      Allergies  No Known Allergies          MEDICATIONS  (STANDING):  atorvastatin Oral Tab/Cap - Peds 40 milliGRAM(s) Oral daily  carbidopa/levodopa  25/100 1 Tablet(s) Oral three times a day  carvedilol 12.5 milliGRAM(s) Oral every 12 hours  cefTRIAXone   IVPB 2 Gram(s) IV Intermittent every 24 hours  cholecalciferol 1000 Unit(s) Oral daily  enoxaparin Injectable 40 milliGRAM(s) SubCutaneous daily  escitalopram 10 milliGRAM(s) Oral daily  lisinopril 20 milliGRAM(s) Oral daily  naproxen 500 milliGRAM(s) Oral two times a day          Vital Signs Last 24 Hrs  T(C): 36.8 (2018 07:00), Max: 37.7 (2018 20:27)  T(F): 98.3 (2018 07:00), Max: 99.8 (2018 20:27)  HR: 85 (2018 07:00) (80 - 89)  BP: 116/69 (2018 07:00) (116/69 - 149/89)  BP(mean): --  RR: 19 (2018 07:00) (19 - 20)  SpO2: 95% (2018 07:00) (95% - 98%)  CAPILLARY BLOOD GLUCOSE  121 (2018 10:09)  65 (2018 09:23)          Physical Exam:    -     General : Not in acute distress.    -      HEENT: PEERLA    -      Cardiac: S1 & S2. No murmur/gallop/rubs.     -      Pulm: clear to auscultate b/l lung field.     -      GI: positive BS. No tenderness/rigidity/rebound.     -      Musculoskeletal: no pitting edema.     -      Neuro: A & O3.         Labs:                        12.7   12.07 )-----------( 114      ( 2018 06:48 )             37.3                           06-21    143  |  107  |  10  ----------------------------<  76  3.5   |  21  |  0.8    Ca    7.8<L>      2018 06:48  Mg     1.7     -    TPro  6.4  /  Alb  3.9  /  TBili  2.4<H>  /  DBili  x   /  AST  40  /  ALT  55<H>  /  AlkPhos  71  06-20    LIVER FUNCTIONS - ( 2018 20:43 )  Alb: 3.9 g/dL / Pro: 6.4 g/dL / ALK PHOS: 71 U/L / ALT: 55 U/L / AST: 40 U/L / GGT: x                                CARDIAC MARKERS ( 2018 06:48 )  x     / <0.01 ng/mL / 457 U/L / x     / 2.2 ng/mL  CARDIAC MARKERS ( 2018 20:43 )  x     / <0.01 ng/mL / 581 U/L / x     / 3.4 ng/mL                           Urinalysis Basic - ( 2018 22:15 )    Color: Dark Yellow / Appearance: Clear / S.020 / pH: x  Gluc: x / Ketone: Trace  / Bili: Negative / Urobili: 1.0 mg/dL   Blood: x / Protein: 30 mg/dL / Nitrite: Negative   Leuk Esterase: Small / RBC: x / WBC 6-10 /HPF   Sq Epi: x / Non Sq Epi: x / Bacteria: x        Culture - Body Fluid with Gram Stain (collected 2018 21:39)  Source: .Body Fluid Knee Fluid  Gram Stain (2018 07:35):    polymorphonuclear leukocytes seen    No organisms seen    by cytocentrifuge            Imaging:    ECG: Patient is a 67y old  Male who presents with a chief complaint of f/p fall and Lt knee pain and swelling (2018 01:17)      Overnight events:   No acute event overnight. Patient reports that he is still having left knee pain, now disseminate to left thumb.   Septic arthritis ruled out.       PAST MEDICAL & SURGICAL HISTORY:  Gout  Diabetes mellitus  HTN (hypertension)  CVA (cerebral vascular accident)  Parkinson disease      Allergies  No Known Allergies          MEDICATIONS  (STANDING):  atorvastatin Oral Tab/Cap - Peds 40 milliGRAM(s) Oral daily  carbidopa/levodopa  25/100 1 Tablet(s) Oral three times a day  carvedilol 12.5 milliGRAM(s) Oral every 12 hours  cefTRIAXone   IVPB 2 Gram(s) IV Intermittent every 24 hours  cholecalciferol 1000 Unit(s) Oral daily  enoxaparin Injectable 40 milliGRAM(s) SubCutaneous daily  escitalopram 10 milliGRAM(s) Oral daily  lisinopril 20 milliGRAM(s) Oral daily  naproxen 500 milliGRAM(s) Oral two times a day          Vital Signs Last 24 Hrs  T(C): 36.8 (2018 07:00), Max: 37.7 (2018 20:27)  T(F): 98.3 (2018 07:00), Max: 99.8 (2018 20:27)  HR: 85 (2018 07:00) (80 - 89)  BP: 116/69 (2018 07:00) (116/69 - 149/89)  BP(mean): --  RR: 19 (2018 07:00) (19 - 20)  SpO2: 95% (2018 07:00) (95% - 98%)  CAPILLARY BLOOD GLUCOSE  121 (2018 10:09)  65 (2018 09:23)          Physical Exam:    -     General : Not in acute distress.    -      HEENT: PEERLA    -      Cardiac: S1 & S2. No murmur/gallop/rubs.     -      Pulm: clear to auscultate b/l lung field.     -      GI: positive BS. No tenderness/rigidity/rebound.     -      Musculoskeletal: no pitting edema.     -      Neuro: A & O3.         Labs:                        12.7   12.07 )-----------( 114      ( 2018 06:48 )             37.3                           06-21    143  |  107  |  10  ----------------------------<  76  3.5   |  21  |  0.8    Ca    7.8<L>      2018 06:48  Mg     1.7         TPro  6.4  /  Alb  3.9  /  TBili  2.4<H>  /  DBili  x   /  AST  40  /  ALT  55<H>  /  AlkPhos  71  06-20    LIVER FUNCTIONS - ( 2018 20:43 )  Alb: 3.9 g/dL / Pro: 6.4 g/dL / ALK PHOS: 71 U/L / ALT: 55 U/L / AST: 40 U/L / GGT: x                                CARDIAC MARKERS ( 2018 06:48 )  x     / <0.01 ng/mL / 457 U/L / x     / 2.2 ng/mL  CARDIAC MARKERS ( 2018 20:43 )  x     / <0.01 ng/mL / 581 U/L / x     / 3.4 ng/mL                           Urinalysis Basic - ( 2018 22:15 )    Color: Dark Yellow / Appearance: Clear / S.020 / pH: x  Gluc: x / Ketone: Trace  / Bili: Negative / Urobili: 1.0 mg/dL   Blood: x / Protein: 30 mg/dL / Nitrite: Negative   Leuk Esterase: Small / RBC: x / WBC 6-10 /HPF   Sq Epi: x / Non Sq Epi: x / Bacteria: x        Culture - Body Fluid with Gram Stain (collected 2018 21:39)  Source: .Body Fluid Knee Fluid  Gram Stain (2018 07:35):    polymorphonuclear leukocytes seen    No organisms seen    by cytocentrifuge            Imaging:    ECG:

## 2023-08-21 NOTE — OCCUPATIONAL THERAPY INITIAL EVALUATION ADULT - LEVEL OF INDEPENDENCE: DRESS UPPER BODY, OT EVAL
Subha Spine [address] [place] [address] since your last TBA seated EOB

## 2024-01-01 NOTE — ED ADULT NURSE NOTE - IDEAL BODY WEIGHT(KG)
CIRCUMCISION PHYSICIAN PROCEDURE NOTE    Preoperative Diagnosis:  Elective Circumcision    Procedure:  Elective Circumcision  Risks and benefits of procedure discussed with parent(s) prior to procedure per physician.  Signed, informed consent for circumcision on chart and identification bands checked.    Positioning of Baby:  On back - legs immobilized.    Site Prepared With:  Betadine    \"Time Out\" completed and agreed upon by all team members present for correct patient identification, circumcision procedure (removal of penile foreskin), signed informed consent and provider performing procedure:  Done.     Anesthetic Agent Used:  Dorsal penile nerve block with 1% Lidocaine.    Equipment for Circumcision Procedure:  [employer]     Specimens:  Not applicable    Estimated Blood Loss:  < 1 mL.    Hemostasis:  Adequate.    Complications:  None.    Dressing:  Petroleum Gauze.    Procedure Findings:  Normal Genitalia    Postoperative Diagnosis:  Elective Circumcision    Additional Findings:  None.      Mariaelena Bullock MD  
[phone]

## 2024-01-31 ENCOUNTER — INPATIENT (INPATIENT)
Facility: HOSPITAL | Age: 74
LOS: 11 days | Discharge: HOME | DRG: 56 | End: 2024-02-12
Attending: STUDENT IN AN ORGANIZED HEALTH CARE EDUCATION/TRAINING PROGRAM | Admitting: STUDENT IN AN ORGANIZED HEALTH CARE EDUCATION/TRAINING PROGRAM
Payer: MEDICARE

## 2024-01-31 VITALS
DIASTOLIC BLOOD PRESSURE: 80 MMHG | TEMPERATURE: 99 F | HEART RATE: 81 BPM | RESPIRATION RATE: 20 BRPM | SYSTOLIC BLOOD PRESSURE: 148 MMHG | OXYGEN SATURATION: 96 %
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
## 2024-01-31 DIAGNOSIS — Z90.49 ACQUIRED ABSENCE OF OTHER SPECIFIED PARTS OF DIGESTIVE TRACT: Chronic | ICD-10-CM

## 2024-01-31 LAB
ALBUMIN SERPL ELPH-MCNC: 3.6 G/DL — SIGNIFICANT CHANGE UP (ref 3.5–5.2)
ALP SERPL-CCNC: 77 U/L — SIGNIFICANT CHANGE UP (ref 30–115)
ALT FLD-CCNC: <5 U/L — SIGNIFICANT CHANGE UP (ref 0–41)
ANION GAP SERPL CALC-SCNC: 8 MMOL/L — SIGNIFICANT CHANGE UP (ref 7–14)
APTT BLD: 28.6 SEC — SIGNIFICANT CHANGE UP (ref 27–39.2)
AST SERPL-CCNC: 17 U/L — SIGNIFICANT CHANGE UP (ref 0–41)
BASOPHILS # BLD AUTO: 0.02 K/UL — SIGNIFICANT CHANGE UP (ref 0–0.2)
BASOPHILS NFR BLD AUTO: 0.2 % — SIGNIFICANT CHANGE UP (ref 0–1)
BILIRUB SERPL-MCNC: 1 MG/DL — SIGNIFICANT CHANGE UP (ref 0.2–1.2)
BUN SERPL-MCNC: 11 MG/DL — SIGNIFICANT CHANGE UP (ref 10–20)
CALCIUM SERPL-MCNC: 8.4 MG/DL — SIGNIFICANT CHANGE UP (ref 8.4–10.5)
CHLORIDE SERPL-SCNC: 101 MMOL/L — SIGNIFICANT CHANGE UP (ref 98–110)
CO2 SERPL-SCNC: 27 MMOL/L — SIGNIFICANT CHANGE UP (ref 17–32)
CREAT SERPL-MCNC: 1 MG/DL — SIGNIFICANT CHANGE UP (ref 0.7–1.5)
EGFR: 80 ML/MIN/1.73M2 — SIGNIFICANT CHANGE UP
EOSINOPHIL # BLD AUTO: 0.02 K/UL — SIGNIFICANT CHANGE UP (ref 0–0.7)
EOSINOPHIL NFR BLD AUTO: 0.2 % — SIGNIFICANT CHANGE UP (ref 0–8)
FLUAV AG NPH QL: SIGNIFICANT CHANGE UP
FLUBV AG NPH QL: SIGNIFICANT CHANGE UP
GLUCOSE BLDC GLUCOMTR-MCNC: 116 MG/DL — HIGH (ref 70–99)
GLUCOSE SERPL-MCNC: 89 MG/DL — SIGNIFICANT CHANGE UP (ref 70–99)
HCT VFR BLD CALC: 40.8 % — LOW (ref 42–52)
HGB BLD-MCNC: 15 G/DL — SIGNIFICANT CHANGE UP (ref 14–18)
IMM GRANULOCYTES NFR BLD AUTO: 0.5 % — HIGH (ref 0.1–0.3)
INR BLD: 1.1 RATIO — SIGNIFICANT CHANGE UP (ref 0.65–1.3)
LACTATE SERPL-SCNC: 1.5 MMOL/L — SIGNIFICANT CHANGE UP (ref 0.7–2)
LIDOCAIN IGE QN: 14 U/L — SIGNIFICANT CHANGE UP (ref 7–60)
LYMPHOCYTES # BLD AUTO: 0.8 K/UL — LOW (ref 1.2–3.4)
LYMPHOCYTES # BLD AUTO: 9.3 % — LOW (ref 20.5–51.1)
MCHC RBC-ENTMCNC: 34.5 PG — HIGH (ref 27–31)
MCHC RBC-ENTMCNC: 36.8 G/DL — SIGNIFICANT CHANGE UP (ref 32–37)
MCV RBC AUTO: 93.8 FL — SIGNIFICANT CHANGE UP (ref 80–94)
MONOCYTES # BLD AUTO: 1.02 K/UL — HIGH (ref 0.1–0.6)
MONOCYTES NFR BLD AUTO: 11.8 % — HIGH (ref 1.7–9.3)
NEUTROPHILS # BLD AUTO: 6.72 K/UL — HIGH (ref 1.4–6.5)
NEUTROPHILS NFR BLD AUTO: 78 % — HIGH (ref 42.2–75.2)
NRBC # BLD: 0 /100 WBCS — SIGNIFICANT CHANGE UP (ref 0–0)
PLATELET # BLD AUTO: 109 K/UL — LOW (ref 130–400)
PMV BLD: 9.4 FL — SIGNIFICANT CHANGE UP (ref 7.4–10.4)
POTASSIUM SERPL-MCNC: 3.6 MMOL/L — SIGNIFICANT CHANGE UP (ref 3.5–5)
POTASSIUM SERPL-SCNC: 3.6 MMOL/L — SIGNIFICANT CHANGE UP (ref 3.5–5)
PROT SERPL-MCNC: 6.1 G/DL — SIGNIFICANT CHANGE UP (ref 6–8)
PROTHROM AB SERPL-ACNC: 12.6 SEC — SIGNIFICANT CHANGE UP (ref 9.95–12.87)
RBC # BLD: 4.35 M/UL — LOW (ref 4.7–6.1)
RBC # FLD: 11.7 % — SIGNIFICANT CHANGE UP (ref 11.5–14.5)
RSV RNA NPH QL NAA+NON-PROBE: SIGNIFICANT CHANGE UP
SARS-COV-2 RNA SPEC QL NAA+PROBE: DETECTED
SODIUM SERPL-SCNC: 136 MMOL/L — SIGNIFICANT CHANGE UP (ref 135–146)
WBC # BLD: 8.62 K/UL — SIGNIFICANT CHANGE UP (ref 4.8–10.8)
WBC # FLD AUTO: 8.62 K/UL — SIGNIFICANT CHANGE UP (ref 4.8–10.8)

## 2024-01-31 PROCEDURE — 84145 PROCALCITONIN (PCT): CPT

## 2024-01-31 PROCEDURE — 85027 COMPLETE CBC AUTOMATED: CPT

## 2024-01-31 PROCEDURE — 86901 BLOOD TYPING SEROLOGIC RH(D): CPT

## 2024-01-31 PROCEDURE — 95819 EEG AWAKE AND ASLEEP: CPT

## 2024-01-31 PROCEDURE — 85025 COMPLETE CBC W/AUTO DIFF WBC: CPT

## 2024-01-31 PROCEDURE — 80061 LIPID PANEL: CPT

## 2024-01-31 PROCEDURE — 80053 COMPREHEN METABOLIC PANEL: CPT

## 2024-01-31 PROCEDURE — 97110 THERAPEUTIC EXERCISES: CPT | Mod: GP

## 2024-01-31 PROCEDURE — 99285 EMERGENCY DEPT VISIT HI MDM: CPT | Mod: FS

## 2024-01-31 PROCEDURE — 83735 ASSAY OF MAGNESIUM: CPT

## 2024-01-31 PROCEDURE — 87040 BLOOD CULTURE FOR BACTERIA: CPT

## 2024-01-31 PROCEDURE — 71260 CT THORAX DX C+: CPT | Mod: 26,MA

## 2024-01-31 PROCEDURE — 72125 CT NECK SPINE W/O DYE: CPT | Mod: 26,MA

## 2024-01-31 PROCEDURE — 82565 ASSAY OF CREATININE: CPT

## 2024-01-31 PROCEDURE — 97162 PT EVAL MOD COMPLEX 30 MIN: CPT | Mod: GP

## 2024-01-31 PROCEDURE — 86850 RBC ANTIBODY SCREEN: CPT

## 2024-01-31 PROCEDURE — 71045 X-RAY EXAM CHEST 1 VIEW: CPT | Mod: 26

## 2024-01-31 PROCEDURE — 70450 CT HEAD/BRAIN W/O DYE: CPT | Mod: 26,MA

## 2024-01-31 PROCEDURE — 97116 GAIT TRAINING THERAPY: CPT | Mod: GP

## 2024-01-31 PROCEDURE — 82962 GLUCOSE BLOOD TEST: CPT

## 2024-01-31 PROCEDURE — 80048 BASIC METABOLIC PNL TOTAL CA: CPT

## 2024-01-31 PROCEDURE — 80076 HEPATIC FUNCTION PANEL: CPT

## 2024-01-31 PROCEDURE — 97165 OT EVAL LOW COMPLEX 30 MIN: CPT | Mod: GO

## 2024-01-31 PROCEDURE — 84550 ASSAY OF BLOOD/URIC ACID: CPT

## 2024-01-31 PROCEDURE — 72170 X-RAY EXAM OF PELVIS: CPT | Mod: 26

## 2024-01-31 PROCEDURE — 86900 BLOOD TYPING SEROLOGIC ABO: CPT

## 2024-01-31 PROCEDURE — 99222 1ST HOSP IP/OBS MODERATE 55: CPT

## 2024-01-31 PROCEDURE — 93005 ELECTROCARDIOGRAM TRACING: CPT

## 2024-01-31 PROCEDURE — 74177 CT ABD & PELVIS W/CONTRAST: CPT | Mod: 26,MA

## 2024-01-31 PROCEDURE — 36415 COLL VENOUS BLD VENIPUNCTURE: CPT

## 2024-01-31 RX ORDER — SODIUM CHLORIDE 9 MG/ML
250 INJECTION INTRAMUSCULAR; INTRAVENOUS; SUBCUTANEOUS ONCE
Refills: 0 | Status: COMPLETED | OUTPATIENT
Start: 2024-01-31 | End: 2024-01-31

## 2024-01-31 RX ORDER — LANOLIN ALCOHOL/MO/W.PET/CERES
1 CREAM (GRAM) TOPICAL
Qty: 0 | Refills: 0 | DISCHARGE

## 2024-01-31 RX ORDER — ENTACAPONE 200 MG/1
200 TABLET, FILM COATED ORAL
Refills: 0 | Status: DISCONTINUED | OUTPATIENT
Start: 2024-01-31 | End: 2024-02-01

## 2024-01-31 RX ORDER — LEVETIRACETAM 250 MG/1
500 TABLET, FILM COATED ORAL
Refills: 0 | Status: DISCONTINUED | OUTPATIENT
Start: 2024-01-31 | End: 2024-02-12

## 2024-01-31 RX ORDER — ZOLPIDEM TARTRATE 10 MG/1
5 TABLET ORAL AT BEDTIME
Refills: 0 | Status: DISCONTINUED | OUTPATIENT
Start: 2024-01-31 | End: 2024-02-05

## 2024-01-31 RX ORDER — ACETAMINOPHEN 500 MG
650 TABLET ORAL EVERY 6 HOURS
Refills: 0 | Status: DISCONTINUED | OUTPATIENT
Start: 2024-01-31 | End: 2024-02-02

## 2024-01-31 RX ORDER — CARBIDOPA AND LEVODOPA 25; 100 MG/1; MG/1
2 TABLET ORAL THREE TIMES A DAY
Refills: 0 | Status: DISCONTINUED | OUTPATIENT
Start: 2024-01-31 | End: 2024-02-01

## 2024-01-31 RX ORDER — KETOROLAC TROMETHAMINE 30 MG/ML
15 SYRINGE (ML) INJECTION ONCE
Refills: 0 | Status: DISCONTINUED | OUTPATIENT
Start: 2024-01-31 | End: 2024-01-31

## 2024-01-31 RX ORDER — ENOXAPARIN SODIUM 100 MG/ML
40 INJECTION SUBCUTANEOUS EVERY 24 HOURS
Refills: 0 | Status: DISCONTINUED | OUTPATIENT
Start: 2024-01-31 | End: 2024-02-12

## 2024-01-31 RX ORDER — TRAZODONE HCL 50 MG
100 TABLET ORAL ONCE
Refills: 0 | Status: DISCONTINUED | OUTPATIENT
Start: 2024-01-31 | End: 2024-02-01

## 2024-01-31 RX ORDER — CARVEDILOL PHOSPHATE 80 MG/1
12.5 CAPSULE, EXTENDED RELEASE ORAL EVERY 12 HOURS
Refills: 0 | Status: DISCONTINUED | OUTPATIENT
Start: 2024-01-31 | End: 2024-02-06

## 2024-01-31 RX ORDER — ASPIRIN/CALCIUM CARB/MAGNESIUM 324 MG
81 TABLET ORAL DAILY
Refills: 0 | Status: DISCONTINUED | OUTPATIENT
Start: 2024-01-31 | End: 2024-02-12

## 2024-01-31 RX ORDER — MECLIZINE HCL 12.5 MG
12.5 TABLET ORAL
Refills: 0 | Status: DISCONTINUED | OUTPATIENT
Start: 2024-01-31 | End: 2024-02-12

## 2024-01-31 RX ADMIN — ZOLPIDEM TARTRATE 5 MILLIGRAM(S): 10 TABLET ORAL at 23:39

## 2024-01-31 RX ADMIN — SODIUM CHLORIDE 250 MILLILITER(S): 9 INJECTION INTRAMUSCULAR; INTRAVENOUS; SUBCUTANEOUS at 13:52

## 2024-01-31 RX ADMIN — Medication 15 MILLIGRAM(S): at 16:38

## 2024-01-31 RX ADMIN — ENTACAPONE 200 MILLIGRAM(S): 200 TABLET, FILM COATED ORAL at 23:39

## 2024-01-31 RX ADMIN — CARBIDOPA AND LEVODOPA 2 TABLET(S): 25; 100 TABLET ORAL at 23:39

## 2024-01-31 NOTE — ED PROVIDER NOTE - CARE PLAN
1 Principal Discharge DX:	Fall  Secondary Diagnosis:	H/O Parkinson's disease  Secondary Diagnosis:	Ambulatory dysfunction  Secondary Diagnosis:	Lung mass

## 2024-01-31 NOTE — H&P ADULT - ATTENDING COMMENTS
*In the event of discrepancy, my note supersedes the resident note.  Date seen: 1/31/24   Agree with HPI above. ROS negative except per HPI. I edited the physical exam above.     Pertinent labs and radiology reviewed:  COVID PCR positive   CXR: normal  Xray pelvis: no acute fracture   CT head and cervical spine: no acute intracranial pathologies or fractures  CT chest/abdomen/pelvis: 1.8 cm left apical lung mass, suspicious for malignancy-new from neck CT of 6/1/2023 were the lung apices were imaged. Recommend oncologic evaluation. Indeterminate right pulmonary nodular opacities. Recommend follow-up. 2 rounded foci in the transverse colon. Differential includes adherent stool versus colonic lesions. Recommend follow-up with colonoscopy. No CT evidence of acute traumatic injury in the chest abdomen or pelvis.    Assessment/Plan:  72yo M w/ pmhx of HTN, parkinson's disease, CVA, DM, seizure disorder? on keppra presents to ED from [facility] for fall. Admitted for further management. Found to incidentally have lung mass with colon lesions as well. Found to incidentally be covid +.     #fall - unclear if mechanical, r/o syncope   #parkinson's disease  - get orthostatics; get PT/OT eval; get rEEG  - f/u neuro   - c/w carbidopa/levodopa     #lung mass  #transverse colon lesions   - pulm eval   - consider GI eval for colonoscopy     #covid-19 infection - asymptomatic  - hold off on therapeutics for now   - ID consult     #hx of seizure disorder?: c/w keppra  #HTN: c/w carvedilol  #DM: monitor FS AC/HS, hold any home po antidiabetics, start insulin regimen for glucose > 180     DVT ppx: lovenox  Dispo: from home; orthostatics, eeg, PT/OT, pulm consult, ID consult, f/u neuro

## 2024-01-31 NOTE — PATIENT PROFILE ADULT - FUNCTIONAL ASSESSMENT - DAILY ACTIVITY SCORE.
well developed, well nourished , in no acute distress , ambulating without difficulty , normal communication ability 20

## 2024-01-31 NOTE — H&P ADULT - NSHPLABSRESULTS_GEN_ALL_CORE
.  LABS:                         15.0   8.62  )-----------( 109      ( 31 Jan 2024 13:17 )             40.8     01-31    136  |  101  |  11  ----------------------------<  89  3.6   |  27  |  1.0    Ca    8.4      31 Jan 2024 13:17    TPro  6.1  /  Alb  3.6  /  TBili  1.0  /  DBili  x   /  AST  17  /  ALT  <5  /  AlkPhos  77  01-31    PT/INR - ( 31 Jan 2024 13:17 )   PT: 12.60 sec;   INR: 1.10 ratio         PTT - ( 31 Jan 2024 13:17 )  PTT:28.6 sec  Urinalysis Basic - ( 31 Jan 2024 13:17 )    Color: x / Appearance: x / SG: x / pH: x  Gluc: 89 mg/dL / Ketone: x  / Bili: x / Urobili: x   Blood: x / Protein: x / Nitrite: x   Leuk Esterase: x / RBC: x / WBC x   Sq Epi: x / Non Sq Epi: x / Bacteria: x            Lactate, Blood: 1.5 mmol/L (01-31 @ 13:17)      RADIOLOGY, EKG & ADDITIONAL TESTS: Reviewed.

## 2024-01-31 NOTE — ED PROVIDER NOTE - OBJECTIVE STATEMENT
pt with pmhx CVA, DM, HTN, gout, Parkinson disease presents for eval from [place] after mechanical fall on his way to bathroom today. unsure of LOC. Denies fever/chill/HA/dizziness/chest pain/palpitation/sob/abd pain/n/v/d/ black stool/bloody stool/urinary sxs

## 2024-01-31 NOTE — ED PROVIDER NOTE - CLINICAL SUMMARY MEDICAL DECISION MAKING FREE TEXT BOX
Patient with frequent falls, history of Parkinson's, likely declining.  Patient refuses to use cane or walker assisted living.  As per patient's emergency contact patient has been worsening with his Parkinson symptoms and he may not be safe at assisted living.  Patient will need further neuro and PT/rehab evaluation.  Any ordered labs and EKG were reviewed.  Any imaging was ordered and reviewed by me.  Appropriate medications for patient's presenting complaints were ordered and effects were reassessed.  Patient's records (prior hospital, ED visit, and/or nursing home notes if available) were reviewed.  Additional history was obtained from EMS, family, and/or PCP (where available).  Escalation to admission/observation was considered.  Patient requires inpatient hospitalization - monitored setting.

## 2024-01-31 NOTE — H&P ADULT - NSHPPHYSICALEXAM_GEN_ALL_CORE
VITAL SIGNS: AFebrile, vital signs stable  CONSTITUTIONAL: Well-developed; well-nourished; in no acute distress.  SKIN: Skin exam is warm and dry, no acute rash.  HEAD: Normocephalic; atraumatic.  EYES: Pupils equal round reactive to light, Extraocular movements intact; conjunctiva and sclera clear.  ENT: No nasal discharge; airway clear. Moist mucus membranes.  NECK: Supple; non tender. No rigidity  CARD: Systolic murmur prominent in aortic area. Regular rate and rhythm. Normal S1, S2; no gallops, or rubs.  RESP: Lungs clear to auscultation bilaterally. No wheezes, rales or rhonchi.  ABD: Abdomen soft; non-tender; non-distended;  no hepatosplenomegaly.    EXT: Left hand tremor. Normal ROM. No clubbing, cyanosis or edema. No calf tenderness to palpation.  NEURO: Alert and oriented x 3. No focal deficits.  PSYCH: Cooperative, appropriate.

## 2024-01-31 NOTE — ED PROVIDER NOTE - ATTENDING APP SHARED VISIT CONTRIBUTION OF CARE
73-year-old male past medical history of hypertension, diabetes, CVA, Parkinson's disease, sent from [facility] assisted living for mechanical fall.  Patient says he was on the way to the bathroom when he fell.  Patient has had multiple falls.  Patient is supposed to use a cane or a walker but refuses.  Patient says he does not needed.  Patient has poor insight on why he keeps falling.  Patient denies any head trauma, neck pain, abdominal pain, chest pain.  Patient says he has back pain that is chronic.  Exam: nad, ncat, perrl, eomi, mmm, rrr, ctab, abd soft, nt, nd AO x 2, mild bilateral paravertebral lumbar muscle tenderness, no midline tenderness, no step-off, moving all extremities impression: Patient with history of Parkinson's disease, frequent falls at assisted living.  Will check labs and imaging

## 2024-01-31 NOTE — ED PROVIDER NOTE - PHYSICAL EXAMINATION
CONSTITUTIONAL: Well-appearing; well-nourished; in no apparent distress.   EYES: PERRL; EOM intact.   ENT: normal nose; no rhinorrhea; normal pharynx with no tonsillar hypertrophy.   NECK: Supple; non-tender; no cervical lymphadenopathy. No JVD.   CARDIOVASCULAR: Normal S1, S2; no murmurs, rubs, or gallops.   RESPIRATORY: Normal chest excursion with respiration; breath sounds clear and equal bilaterally; no wheezes, rhonchi, or rales.  GI/: Non-distended; non-tender; no palpable organomegaly.   MS: No evidence of trauma or deformity. No CVA tenderness. Normal ROM in all four extremities; non-tender to palpation; distal pulses are normal.   SKIN: Normal for age and race; warm; dry; good turgor; no apparent lesions or exudate.   NEURO/PSYCH: A & O x 4; grossly unremarkable. mood and manner are appropriate.

## 2024-01-31 NOTE — H&P ADULT - HISTORY OF PRESENT ILLNESS
Patient is a 72 y/o male with PMH of Parkinson's disease (diagnosed  7 years ago, HTN, 2 x CVA (1998, 2016), DM, Seizure (2019) on keppra presenting to the ED after 2 x falls. (of note, patient was admitted for same complaints in 09/22/2022)    Patient states that he woke up around 0500 went to the bathroom and fell in the kitchen. He was unable to recall if he lost consciousness but was able to get back up and go back to the bedroom. When getting back up from bed, he fell again and was not able to get up without assistance. He cannot recall if he felt dizzy or lost consciousness He states that he was unable to move his legs. He endorses some mid-back tenderness attributed to the fall and tremor of his left hand attributed to parkinson's. He also endorses constant mild vertigo has improved since last admission in 06/2023.     Otherwise, he denies fevers, chills, SOB, cough, sputum production, night sweats, headaches, abdominal pain, diarrhea, dysuria, LE edema.     In the ED, patient tested positive for COVID. BP was 160s/50s, otherwise, vital signs were within normal limits. CBC, CMP, PT INR was unremarkable.     Trauma w/u was negative for acute traumatic injury. (CT chest, CT abdomen/pelvis, CT c-spine, CT head, xray pelvis, xray chest)    CT chest showed incidental finding of 1.8 cm left apical lung mass, suspicious for malignancy-new from neck CT of 6/1/2023.    CT abdomen showed: 2 rounded foci in the transverse colon. Differential includes adherent stool versus colonic lesions.     Patient is admitted to the floor for fall.

## 2024-01-31 NOTE — H&P ADULT - ASSESSMENT
Patient is a 72 y/o male with PMH of Parkinson's disease (diagnosed  7 years ago, HTN, 2 x CVA (1998, 2016), DM, Seizure (2019) on keppra presenting to the ED after 2 x falls. (of note, patient was admitted for same complaints in 09/22/2022)      #Fall 2/2 suspected akinesia vs orthostatic hypotension vs syncope vs vertigo exacerbated by COVID  #Parkinson's disease  #COVID infection  #Hx of Seizure (2019)  #Hx of persistent Vertigo  - seen by ENT 06/2023  - on keppra 500mg BID, MR head negative, orthostatic vital signs negative (06/2023)  - CXR unremarkable, on RA, asymptomatic.  - Trauma w/u negative for acute traumatic injury.   - on sinemet, entacapone, rivastigmine    - PT eval  - Neuro consult for parkinson's medication adjustment  - ID consult for COVID  - isolation/fall precautions  - c/w meclizine    # HTN  - BP 160s/50s  - c/w carvedilol     #Apical lung mass- incidental finding  - CT chest (01/31/2024): 1.8 cm left apical lung mass, suspicious for malignancy-new from neck CT of 6/1/2023 were the lung apices were imaged. Indeterminate right pulmonary nodular opacities.   - Patient request medical w/u  - Pulm consult.    #Colonic lesion vs adherent stool  - OP colonoscopy.     # Hx of CVA (1996, 2016)   - no residual deficits     # Insomnia   - c/w trazodone   - c/w Lunesta    GI prophylaxis: None  DVT prophylaxis: lovenox  Diet: Regular  Code Status: Full code  Disposition: Med/surg    pending: ID/pulm/neuro consults.   Patient is a 74 y/o male with PMH of Parkinson's disease (diagnosed  7 years ago, HTN, 2 x CVA (1998, 2016), DM, Seizure (2019) on keppra presenting to the ED after 2 x falls. (of note, patient was admitted for same complaints in 09/22/2022)      #Fall 2/2 suspected akinesia vs orthostatic hypotension vs syncope vs vertigo   #Parkinson's disease  #Hx of Seizure (2019)  #Hx of persistent Vertigo  - seen by ENT 06/2023  - on keppra 500mg BID, MR head negative, orthostatic vital signs negative (06/2023)  - Trauma w/u negative for acute traumatic injury.   - on sinemet, entacapone, rivastigmine    - PT eval  - Neuro consult for parkinson's medication adjustment    - isolation/fall precautions  - c/w meclizine    #COVID infection  - CXR unremarkable, on RA, asymptomatic.  - ID consult for COVID    # HTN  - BP 160s/50s  - c/w carvedilol     #Apical lung mass- incidental finding  - CT chest (01/31/2024): 1.8 cm left apical lung mass, suspicious for malignancy-new from neck CT of 6/1/2023 were the lung apices were imaged. Indeterminate right pulmonary nodular opacities.   - Patient request medical w/u  - Pulm consult.    #Colonic lesion vs adherent stool  - consider GI consult for possible inpatient colonoscopy.    # Hx of CVA (1996, 2016)   - no residual deficits     # Insomnia   - c/w trazodone   - c/w Lunesta    GI prophylaxis: None  DVT prophylaxis: lovenox  Diet: Regular  Code Status: Full code  Disposition: Med/surg    pending: ID/pulm/neuro consults.   Patient is a 72 y/o male with PMH of Parkinson's disease (diagnosed  7 years ago, HTN, 2 x CVA (1998, 2016), DM, Seizure (2019) on keppra presenting to the ED after 2 x falls. (of note, patient was admitted for same complaints in 09/22/2022)      #Fall 2/2 suspected akinesia vs orthostatic hypotension vs syncope vs vertigo   #Parkinson's disease  #Hx of Seizure (2019)  #Hx of persistent Vertigo  - seen by ENT 06/2023  - on keppra 500mg BID, MR head negative, orthostatic vital signs negative (06/2023)  - Trauma w/u negative for acute traumatic injury.   - on sinemet, entacapone, rivastigmine    - PT eval  - Neuro consult for parkinson's medication adjustment. Patient requesting for increase in symptoms and left hand tremor.  - isolation/fall precautions  - c/w meclizine    #COVID infection  - CXR unremarkable, on RA, asymptomatic.  - ID consult for COVID    # HTN  - BP 160s/50s  - c/w carvedilol     #Apical lung mass- incidental finding  - CT chest (01/31/2024): 1.8 cm left apical lung mass, suspicious for malignancy-new from neck CT of 6/1/2023 were the lung apices were imaged. Indeterminate right pulmonary nodular opacities.   - Patient request medical w/u  - Pulm consult.    #Colonic lesion vs adherent stool  - consider GI consult for possible inpatient colonoscopy.    # Hx of CVA (1996, 2016)   - no residual deficits     # Insomnia   - c/w trazodone   - c/w Lunesta    GI prophylaxis: None  DVT prophylaxis: lovenox  Diet: Regular  Code Status: Full code  Disposition: Med/surg    pending: ID/pulm/neuro consults.

## 2024-01-31 NOTE — PATIENT PROFILE ADULT - FALL HARM RISK - HARM RISK INTERVENTIONS

## 2024-01-31 NOTE — ED PROVIDER NOTE - PROGRESS NOTE DETAILS
all findings were d/w pt and jose c emergency contact per request. pt to be admitted for further eval/rehab and poss neuro for worsening parkinsons

## 2024-02-01 LAB
ALBUMIN SERPL ELPH-MCNC: 3.5 G/DL — SIGNIFICANT CHANGE UP (ref 3.5–5.2)
ALP SERPL-CCNC: 73 U/L — SIGNIFICANT CHANGE UP (ref 30–115)
ALT FLD-CCNC: <5 U/L — SIGNIFICANT CHANGE UP (ref 0–41)
ANION GAP SERPL CALC-SCNC: 12 MMOL/L — SIGNIFICANT CHANGE UP (ref 7–14)
AST SERPL-CCNC: 22 U/L — SIGNIFICANT CHANGE UP (ref 0–41)
BASOPHILS # BLD AUTO: 0.03 K/UL — SIGNIFICANT CHANGE UP (ref 0–0.2)
BASOPHILS NFR BLD AUTO: 0.3 % — SIGNIFICANT CHANGE UP (ref 0–1)
BILIRUB SERPL-MCNC: 0.9 MG/DL — SIGNIFICANT CHANGE UP (ref 0.2–1.2)
BUN SERPL-MCNC: 15 MG/DL — SIGNIFICANT CHANGE UP (ref 10–20)
CALCIUM SERPL-MCNC: 8.2 MG/DL — LOW (ref 8.4–10.4)
CHLORIDE SERPL-SCNC: 104 MMOL/L — SIGNIFICANT CHANGE UP (ref 98–110)
CO2 SERPL-SCNC: 24 MMOL/L — SIGNIFICANT CHANGE UP (ref 17–32)
CREAT SERPL-MCNC: 1 MG/DL — SIGNIFICANT CHANGE UP (ref 0.7–1.5)
EGFR: 80 ML/MIN/1.73M2 — SIGNIFICANT CHANGE UP
EOSINOPHIL # BLD AUTO: 0.01 K/UL — SIGNIFICANT CHANGE UP (ref 0–0.7)
EOSINOPHIL NFR BLD AUTO: 0.1 % — SIGNIFICANT CHANGE UP (ref 0–8)
GLUCOSE BLDC GLUCOMTR-MCNC: 91 MG/DL — SIGNIFICANT CHANGE UP (ref 70–99)
GLUCOSE SERPL-MCNC: 76 MG/DL — SIGNIFICANT CHANGE UP (ref 70–99)
HCT VFR BLD CALC: 42.7 % — SIGNIFICANT CHANGE UP (ref 42–52)
HGB BLD-MCNC: 15.3 G/DL — SIGNIFICANT CHANGE UP (ref 14–18)
IMM GRANULOCYTES NFR BLD AUTO: 1.1 % — HIGH (ref 0.1–0.3)
LYMPHOCYTES # BLD AUTO: 1.08 K/UL — LOW (ref 1.2–3.4)
LYMPHOCYTES # BLD AUTO: 11.4 % — LOW (ref 20.5–51.1)
MCHC RBC-ENTMCNC: 34.5 PG — HIGH (ref 27–31)
MCHC RBC-ENTMCNC: 35.8 G/DL — SIGNIFICANT CHANGE UP (ref 32–37)
MCV RBC AUTO: 96.2 FL — HIGH (ref 80–94)
MONOCYTES # BLD AUTO: 0.89 K/UL — HIGH (ref 0.1–0.6)
MONOCYTES NFR BLD AUTO: 9.4 % — HIGH (ref 1.7–9.3)
NEUTROPHILS # BLD AUTO: 7.33 K/UL — HIGH (ref 1.4–6.5)
NEUTROPHILS NFR BLD AUTO: 77.7 % — HIGH (ref 42.2–75.2)
NRBC # BLD: 0 /100 WBCS — SIGNIFICANT CHANGE UP (ref 0–0)
PLATELET # BLD AUTO: 104 K/UL — LOW (ref 130–400)
PMV BLD: 9.6 FL — SIGNIFICANT CHANGE UP (ref 7.4–10.4)
POTASSIUM SERPL-MCNC: 3.5 MMOL/L — SIGNIFICANT CHANGE UP (ref 3.5–5)
POTASSIUM SERPL-SCNC: 3.5 MMOL/L — SIGNIFICANT CHANGE UP (ref 3.5–5)
PROT SERPL-MCNC: 6.1 G/DL — SIGNIFICANT CHANGE UP (ref 6–8)
RBC # BLD: 4.44 M/UL — LOW (ref 4.7–6.1)
RBC # FLD: 11.9 % — SIGNIFICANT CHANGE UP (ref 11.5–14.5)
SODIUM SERPL-SCNC: 140 MMOL/L — SIGNIFICANT CHANGE UP (ref 135–146)
WBC # BLD: 9.44 K/UL — SIGNIFICANT CHANGE UP (ref 4.8–10.8)
WBC # FLD AUTO: 9.44 K/UL — SIGNIFICANT CHANGE UP (ref 4.8–10.8)

## 2024-02-01 PROCEDURE — 95819 EEG AWAKE AND ASLEEP: CPT | Mod: 26

## 2024-02-01 PROCEDURE — 99233 SBSQ HOSP IP/OBS HIGH 50: CPT

## 2024-02-01 PROCEDURE — 99222 1ST HOSP IP/OBS MODERATE 55: CPT

## 2024-02-01 PROCEDURE — 99223 1ST HOSP IP/OBS HIGH 75: CPT

## 2024-02-01 RX ORDER — RIVASTIGMINE 4.6 MG/24H
1 PATCH, EXTENDED RELEASE TRANSDERMAL EVERY 24 HOURS
Refills: 0 | Status: DISCONTINUED | OUTPATIENT
Start: 2024-02-01 | End: 2024-02-12

## 2024-02-01 RX ORDER — ALLOPURINOL 300 MG
200 TABLET ORAL DAILY
Refills: 0 | Status: DISCONTINUED | OUTPATIENT
Start: 2024-02-01 | End: 2024-02-12

## 2024-02-01 RX ORDER — CARBIDOPA AND LEVODOPA 25; 100 MG/1; MG/1
1 TABLET ORAL
Refills: 0 | Status: DISCONTINUED | OUTPATIENT
Start: 2024-02-01 | End: 2024-02-12

## 2024-02-01 RX ORDER — CARBIDOPA AND LEVODOPA 25; 100 MG/1; MG/1
2 TABLET ORAL
Refills: 0 | Status: DISCONTINUED | OUTPATIENT
Start: 2024-02-01 | End: 2024-02-12

## 2024-02-01 RX ORDER — METHOCARBAMOL 500 MG/1
750 TABLET, FILM COATED ORAL EVERY 6 HOURS
Refills: 0 | Status: DISCONTINUED | OUTPATIENT
Start: 2024-02-01 | End: 2024-02-04

## 2024-02-01 RX ORDER — ENTACAPONE 200 MG/1
200 TABLET, FILM COATED ORAL THREE TIMES A DAY
Refills: 0 | Status: DISCONTINUED | OUTPATIENT
Start: 2024-02-01 | End: 2024-02-12

## 2024-02-01 RX ORDER — LIDOCAINE 4 G/100G
1 CREAM TOPICAL DAILY
Refills: 0 | Status: DISCONTINUED | OUTPATIENT
Start: 2024-02-01 | End: 2024-02-12

## 2024-02-01 RX ORDER — AMLODIPINE BESYLATE 2.5 MG/1
5 TABLET ORAL ONCE
Refills: 0 | Status: DISCONTINUED | OUTPATIENT
Start: 2024-02-01 | End: 2024-02-01

## 2024-02-01 RX ORDER — CHLORHEXIDINE GLUCONATE 213 G/1000ML
1 SOLUTION TOPICAL DAILY
Refills: 0 | Status: DISCONTINUED | OUTPATIENT
Start: 2024-02-01 | End: 2024-02-02

## 2024-02-01 RX ORDER — TRAMADOL HYDROCHLORIDE 50 MG/1
50 TABLET ORAL EVERY 8 HOURS
Refills: 0 | Status: DISCONTINUED | OUTPATIENT
Start: 2024-02-01 | End: 2024-02-01

## 2024-02-01 RX ADMIN — CARBIDOPA AND LEVODOPA 2 TABLET(S): 25; 100 TABLET ORAL at 05:28

## 2024-02-01 RX ADMIN — CARVEDILOL PHOSPHATE 12.5 MILLIGRAM(S): 80 CAPSULE, EXTENDED RELEASE ORAL at 05:28

## 2024-02-01 RX ADMIN — CHLORHEXIDINE GLUCONATE 1 APPLICATION(S): 213 SOLUTION TOPICAL at 13:47

## 2024-02-01 RX ADMIN — CARVEDILOL PHOSPHATE 12.5 MILLIGRAM(S): 80 CAPSULE, EXTENDED RELEASE ORAL at 17:54

## 2024-02-01 RX ADMIN — CARBIDOPA AND LEVODOPA 2 TABLET(S): 25; 100 TABLET ORAL at 17:54

## 2024-02-01 RX ADMIN — Medication 650 MILLIGRAM(S): at 05:27

## 2024-02-01 RX ADMIN — Medication 200 MILLIGRAM(S): at 17:56

## 2024-02-01 RX ADMIN — LIDOCAINE 1 PATCH: 4 CREAM TOPICAL at 19:52

## 2024-02-01 RX ADMIN — RIVASTIGMINE 1 PATCH: 4.6 PATCH, EXTENDED RELEASE TRANSDERMAL at 19:52

## 2024-02-01 RX ADMIN — ENOXAPARIN SODIUM 40 MILLIGRAM(S): 100 INJECTION SUBCUTANEOUS at 05:29

## 2024-02-01 RX ADMIN — CARBIDOPA AND LEVODOPA 1 TABLET(S): 25; 100 TABLET ORAL at 22:01

## 2024-02-01 RX ADMIN — LEVETIRACETAM 500 MILLIGRAM(S): 250 TABLET, FILM COATED ORAL at 05:28

## 2024-02-01 RX ADMIN — Medication 12.5 MILLIGRAM(S): at 17:54

## 2024-02-01 RX ADMIN — ENTACAPONE 200 MILLIGRAM(S): 200 TABLET, FILM COATED ORAL at 05:28

## 2024-02-01 RX ADMIN — Medication 650 MILLIGRAM(S): at 06:56

## 2024-02-01 RX ADMIN — METHOCARBAMOL 750 MILLIGRAM(S): 500 TABLET, FILM COATED ORAL at 13:42

## 2024-02-01 RX ADMIN — METHOCARBAMOL 750 MILLIGRAM(S): 500 TABLET, FILM COATED ORAL at 17:54

## 2024-02-01 RX ADMIN — ENTACAPONE 200 MILLIGRAM(S): 200 TABLET, FILM COATED ORAL at 22:01

## 2024-02-01 RX ADMIN — LIDOCAINE 1 PATCH: 4 CREAM TOPICAL at 13:42

## 2024-02-01 RX ADMIN — METHOCARBAMOL 750 MILLIGRAM(S): 500 TABLET, FILM COATED ORAL at 10:01

## 2024-02-01 RX ADMIN — ENTACAPONE 200 MILLIGRAM(S): 200 TABLET, FILM COATED ORAL at 14:01

## 2024-02-01 RX ADMIN — Medication 12.5 MILLIGRAM(S): at 05:28

## 2024-02-01 RX ADMIN — LEVETIRACETAM 500 MILLIGRAM(S): 250 TABLET, FILM COATED ORAL at 17:54

## 2024-02-01 RX ADMIN — RIVASTIGMINE 1 PATCH: 4.6 PATCH, EXTENDED RELEASE TRANSDERMAL at 10:02

## 2024-02-01 RX ADMIN — Medication 81 MILLIGRAM(S): at 13:51

## 2024-02-01 NOTE — PROGRESS NOTE ADULT - SUBJECTIVE AND OBJECTIVE BOX
24H events:    Patient is a 73y old Male who presents with a chief complaint of Fall (01 Feb 2024 09:45)    Primary diagnosis of Fall      Day 1:  Day 2:  Day 3:     Today is hospital day 1d. This morning patient was seen and examined at bedside, resting comfortably in bed.    No acute or major events overnight.    Code Status:    Family communication:  Contact date:  Name of person contacted:  Relationship to patient:  Communication details:  What matters most:    PAST MEDICAL & SURGICAL HISTORY  Parkinson disease    CVA (cerebral vascular accident)    HTN (hypertension)    Diabetes mellitus    Gout    History of appendectomy      SOCIAL HISTORY:  Social History:  Denies EtOH, tobacco, illicit drug use. (31 Jan 2024 20:52)      ALLERGIES:  sulfa drugs (Unknown)  penicillin (Unknown)  cefaclor (Unknown)    MEDICATIONS:  STANDING MEDICATIONS  aspirin  chewable 81 milliGRAM(s) Oral daily  carbidopa/levodopa  25/100 2 Tablet(s) Oral three times a day  carbidopa/levodopa CR 50/200 1 Tablet(s) Oral <User Schedule>  carvedilol 12.5 milliGRAM(s) Oral every 12 hours  chlorhexidine 2% Cloths 1 Application(s) Topical daily  enoxaparin Injectable 40 milliGRAM(s) SubCutaneous every 24 hours  entacapone 200 milliGRAM(s) Oral four times a day  levETIRAcetam 500 milliGRAM(s) Oral two times a day  lidocaine   4% Patch 1 Patch Transdermal daily  meclizine 12.5 milliGRAM(s) Oral two times a day  methocarbamol 750 milliGRAM(s) Oral every 6 hours  rivastigmine patch  9.5 mG/24 Hr(s) 1 Patch Transdermal every 24 hours    PRN MEDICATIONS  acetaminophen     Tablet .. 650 milliGRAM(s) Oral every 6 hours PRN  traMADol 50 milliGRAM(s) Oral every 8 hours PRN  traZODone 100 milliGRAM(s) Oral once PRN  zolpidem 5 milliGRAM(s) Oral at bedtime PRN    VITALS:   T(F): 99.5  HR: 91  BP: 120/81  RR: 18  SpO2: 99%    PHYSICAL EXAM:  GENERAL:   (  ) NAD, lying in bed comfortably     (  ) obtunded     (  ) lethargic     (  ) somnolent    HEAD:   (  ) Atraumatic     (  ) hematoma     (  ) laceration (specify location:       )     NECK:  (  ) Supple     (  ) neck stiffness     (  ) nuchal rigidity     (  )  no JVD     (  ) JVD present ( -- cm)    HEART:  Rate -->     (  ) normal rate     (  ) bradycardic     (  ) tachycardic  Rhythm -->     (  ) regular     (  ) regularly irregular     (  ) irregularly irregular  Murmurs -->     (  ) normal s1s2     (  ) systolic murmur     (  ) diastolic murmur     (  ) continuous murmur      (  ) S3 present     (  ) S4 present    LUNGS:   (  )Unlabored respirations     (  ) tachypnea  (  ) B/L air entry     (  ) decreased breath sounds in:  (location     )    (  ) no adventitious sound     (  ) crackles     (  ) wheezing      (  ) rhonchi      (specify location:       )  (  ) chest wall tenderness (specify location:       )    ABDOMEN:   (  ) Soft     (  ) tense   |   (  ) nondistended     (  ) distended   |   (  ) +BS     (  ) hypoactive bowel sounds     (  ) hyperactive bowel sounds  (  ) nontender     (  ) RUQ tenderness     (  ) RLQ tenderness     (  ) LLQ tenderness     (  ) epigastric tenderness     (  ) diffuse tenderness  (  ) Splenomegaly      (  ) Hepatomegaly      (  ) Jaundice     (  ) ecchymosis     EXTREMITIES:  (  ) Normal     (  ) Rash     (  ) ecchymosis     (  ) varicose veins      (  ) pitting edema     (  ) non-pitting edema   (  ) ulceration     (  ) gangrene:     (location:     )    NERVOUS SYSTEM:    (  ) A&Ox3     (  ) confused     (  ) lethargic  CN II-XII:     (  ) Intact     (  ) deficits found     (Specify:     )   Upper extremities:     (  ) no sensorimotor deficits     (  ) weakness     (  ) loss of proprioception/vibration     (  ) loss of touch/temperature (specify:    )  Lower extremities:     (  ) no sensorimotor deficits     (  ) weakness     (  ) loss of proprioception/vibration     (  ) loss of touch/temperature (specify:    )    SKIN:   (  ) No rashes or lesions     (  ) maculopapular rash     (  ) pustules     (  ) vesicles     (  ) ulcer     (  ) ecchymosis     (specify location:     )    AMPAC score:    (  ) Indwelling Ewing Catheter:   Date insterted:    Reason (  ) Critical illness     (  ) urinary retention    (  ) Accurate Ins/Outs Monitoring     (  ) CMO patient    (  ) Central Line:   Date inserted:  Location: (  ) Right IJ     (  ) Left IJ     (  ) Right Fem     (  ) Left Fem    (  ) SPC        (  ) pigtail       (  ) PEG tube       (  ) colostomy       (  ) jejunostomy  (  ) U-Dall    LABS:                        15.3   9.44  )-----------( 104      ( 01 Feb 2024 06:23 )             42.7     02-01    140  |  104  |  15  ----------------------------<  76  3.5   |  24  |  1.0    Ca    8.2<L>      01 Feb 2024 06:23    TPro  6.1  /  Alb  3.5  /  TBili  0.9  /  DBili  x   /  AST  22  /  ALT  <5  /  AlkPhos  73  02-01    PT/INR - ( 31 Jan 2024 13:17 )   PT: 12.60 sec;   INR: 1.10 ratio         PTT - ( 31 Jan 2024 13:17 )  PTT:28.6 sec  Urinalysis Basic - ( 01 Feb 2024 06:23 )    Color: x / Appearance: x / SG: x / pH: x  Gluc: 76 mg/dL / Ketone: x  / Bili: x / Urobili: x   Blood: x / Protein: x / Nitrite: x   Leuk Esterase: x / RBC: x / WBC x   Sq Epi: x / Non Sq Epi: x / Bacteria: x        Lactate, Blood: 1.5 mmol/L (01-31-24 @ 13:17)          RADIOLOGY:           24H events:    Patient is a 73y old Male who presents with a chief complaint of Fall (01 Feb 2024 09:45)    Primary diagnosis of Fall    Today is hospital day 1d. This morning patient was seen and examined at bedside, resting comfortably in bed.    No acute or major events overnight.    PAST MEDICAL & SURGICAL HISTORY  Parkinson disease    CVA (cerebral vascular accident)    HTN (hypertension)    Diabetes mellitus    Gout    History of appendectomy      SOCIAL HISTORY:  Social History:  Denies EtOH, tobacco, illicit drug use. (31 Jan 2024 20:52)      ALLERGIES:  sulfa drugs (Unknown)  penicillin (Unknown)  cefaclor (Unknown)    MEDICATIONS:  STANDING MEDICATIONS  aspirin  chewable 81 milliGRAM(s) Oral daily  carbidopa/levodopa  25/100 2 Tablet(s) Oral three times a day  carbidopa/levodopa CR 50/200 1 Tablet(s) Oral <User Schedule>  carvedilol 12.5 milliGRAM(s) Oral every 12 hours  chlorhexidine 2% Cloths 1 Application(s) Topical daily  enoxaparin Injectable 40 milliGRAM(s) SubCutaneous every 24 hours  entacapone 200 milliGRAM(s) Oral four times a day  levETIRAcetam 500 milliGRAM(s) Oral two times a day  lidocaine   4% Patch 1 Patch Transdermal daily  meclizine 12.5 milliGRAM(s) Oral two times a day  methocarbamol 750 milliGRAM(s) Oral every 6 hours  rivastigmine patch  9.5 mG/24 Hr(s) 1 Patch Transdermal every 24 hours    PRN MEDICATIONS  acetaminophen     Tablet .. 650 milliGRAM(s) Oral every 6 hours PRN  traMADol 50 milliGRAM(s) Oral every 8 hours PRN  traZODone 100 milliGRAM(s) Oral once PRN  zolpidem 5 milliGRAM(s) Oral at bedtime PRN    VITALS:   T(F): 99.5  HR: 91  BP: 120/81  RR: 18  SpO2: 99%    PHYSICAL EXAM:    CONSTITUTIONAL: Appears uncomfortable from back pain  SKIN: Skin exam is warm and dry  HEAD: Normocephalic; atraumatic.  EYES: Pupils equal round reactive to light; conjunctiva and sclera clear.  NECK: Supple; non tender  CARD: Regular rate and rhythm. Normal S1, S2  RESP: Lungs clear to auscultation bilaterally. No wheezes  ABD: Abdomen soft; non-tender; non-distended  EXT: No clubbing, cyanosis or edema. R leg stiffness  NEURO: Alert and oriented x 3. No focal deficits.  PSYCH: Cooperative, appropriate.    LABS:                        15.3   9.44  )-----------( 104      ( 01 Feb 2024 06:23 )             42.7     02-01    140  |  104  |  15  ----------------------------<  76  3.5   |  24  |  1.0    Ca    8.2<L>      01 Feb 2024 06:23    TPro  6.1  /  Alb  3.5  /  TBili  0.9  /  DBili  x   /  AST  22  /  ALT  <5  /  AlkPhos  73  02-01    PT/INR - ( 31 Jan 2024 13:17 )   PT: 12.60 sec;   INR: 1.10 ratio         PTT - ( 31 Jan 2024 13:17 )  PTT:28.6 sec  Urinalysis Basic - ( 01 Feb 2024 06:23 )    Color: x / Appearance: x / SG: x / pH: x  Gluc: 76 mg/dL / Ketone: x  / Bili: x / Urobili: x   Blood: x / Protein: x / Nitrite: x   Leuk Esterase: x / RBC: x / WBC x   Sq Epi: x / Non Sq Epi: x / Bacteria: x        Lactate, Blood: 1.5 mmol/L (01-31-24 @ 13:17)          RADIOLOGY:

## 2024-02-01 NOTE — EEG REPORT - NS EEG TEXT BOX
[place] [facility]  Inpatient Routine-EEG Report      Patient Name:	ZAKIA MILLS    :	1950  MRN:	-  Study Date/Time:	2024, 3:07:55 AM  Referred by:	-    Brief Clinical History:  ZAKIA MILLS is a 73 year old -; study performed to investigate for seizures or markers of epilepsy.   Diagnosis Code: R40.4 Transient alteration of awareness    Patient Medication:  asprin    lovenox    tylenol    sinemet    coreg    comtan    keppra    antivert    desyrel    ambien      Acquisition Details:  Electroencephalography was acquired using a minimum of 21 channels on an [employer] system v 9.3.1 with electrode placement according to the standard International 10-20 system following ACNS (American Clinical Neurophysiology Society) guidelines.  Anterior temporal T1 and T2 electrodes were utilized whenever possible.   The [employer] automated spike & seizure detections were all reviewed in detail, in addition to the entire raw EEG.    Findings:  Background:  continuous.   Voltage:  Normal (20uV)  Organization:  Appropriate anterior-posterior gradient  Posterior Dominant Rhythm:  9 Hz symmetric, well-organized, and well-modulated  Variability:  Yes	Reactivity:  Yes  Sleep:  Symmetric, synchronous spindles and K complexes.  Focal abnormalities:  No persistent asymmetries of voltage or frequency.  Interictal Activity:  None  Focal Slowing:  Left frontotemporal  Generalized Slowing:  No  Events:  1)	No electrographic seizures or significant clinical events.  Provocations:  1)	Hyperventilation: was not performed.  2)	Photic stimulation: was not performed.  Impression:  Abnormal due to focal slowing as above    Clinical Correlation:  Focal electrocerebral dysfunction secondary to nonspecific etiology    Dany Gorman MD  Attending Neurologist, Division of Epilepsy

## 2024-02-01 NOTE — PROGRESS NOTE ADULT - SUBJECTIVE AND OBJECTIVE BOX
ZAKIA MILLS  73y  Male  ***My note supersedes ALL resident notes that I sign.  My corrections for their notes are in my note.***    I can be reached directly on [id number]. My office number is [phone]. My personal cell number is [phone].    INTERVAL EVENTS: Here for f/u of diff walking. Pt felt that his diff walking was due to recent COVID; however, he has no other COVID symp and did not realize he had COVID until he was tested. Neuro believes his diff walking is from early morning freezing from a lack of sinemet in the AM because of lack of long-acting sinemet at bedtime. Pt is also agreeable to rehab at [facility] rehab unit. Pt is c/o lumbar pain along midline. Makes him uncomfortable moving in bed and makes it diff to stand and walk. Pt does NOT usually have back pain.    T(F): 97.6 (02-01-24 @ 12:18), Max: 99.5 (02-01-24 @ 05:23)  HR: 73 (02-01-24 @ 12:18) (61 - 91)  BP: 134/80 (02-01-24 @ 12:18) (120/81 - 178/90)  RR: 18 (02-01-24 @ 12:18) (18 - 20)  SpO2: 99% (01-31-24 @ 21:06) (96% - 99%)    Gen: NAD; flat affect  HEENT: PERRL, EOMI, mouth clr, nose clr  Neck: no nodes, no JVD, thyroid nl  lungs: clr  hrt: s1 s2 rrr no murmur  abd: soft, NT/ND, no HS megaly  back: mild tender lumbar area  ext: no edema, no c/c  neuro: aa ox3, cn intact, can move all 4 ext    LABS:                      15.3    (    96.2   9.44  )-----------( ---------      104      ( 01 Feb 2024 06:23 )             42.7    (    11.9     Platelet Count - Automated: 104 K/uL (02-01-24 @ 06:23)  Platelet Count - Automated: 109 K/uL (01-31-24 @ 13:17)    140   (   104   (   76      02-01-24 @ 06:23  ----------------------               3.5   (   24   (   15                             -----                        1.0  Ca  8.2   Mg  --    P   --     LFT  6.1  (  0.9  (  22       02-01-24 @ 06:23  -------------------------  3.5  (  73  (  <5    PT/INR - ( 31 Jan 2024 13:17 )   PT: 12.60 sec;   INR: 1.10 ratio    PTT - ( 31 Jan 2024 13:17 )  PTT: 28.6 sec    Urinalysis Basic - ( 01 Feb 2024 06:23 )    Color: x / Appearance: x / SG: x / pH: x  Gluc: 76 mg/dL / Ketone: x  / Bili: x / Urobili: x   Blood: x / Protein: x / Nitrite: x   Leuk Esterase: x / RBC: x / WBC x   Sq Epi: x / Non Sq Epi: x / Bacteria: x    CAPILLARY BLOOD GLUCOSE  POCT Blood Glucose.: 91 (02-01-24 @ 07:51)  POCT Blood Glucose.: 116 (01-31-24 @ 21:36)    RADIOLOGY & ADDITIONAL TESTS:  < from: CT Abdomen and Pelvis w/ IV Cont (01.31.24 @ 14:11) >  CHEST:    LUNGS/PLEURA/AIRWAYS: Limited evaluation due to motion. 1.8 cm left   apical mass (6/46), new from neck CT of 6/1/2023, where the lung apices   were imaged. Additional indeterminate right-sided nodular opacities   measuring up to 6 mm , for example (6/98). Lateral atelectatic changes.   No pleural effusion or pneumothorax.    MEDIASTINUM/THORACIC NODES: No lymphadenopathy..    HEART/GREAT VESSELS: Coronary and aortic calcifications. No pericardial   effusion.. Ectatic ascending thoracic aorta measuring up to 4.2 cm.    ABDOMEN/PELVIS:    HEPATOBILIARY: Cholelithiasis.    SPLEEN: Unremarkable.    PANCREAS: Unremarkable.    ADRENAL GLANDS: Bilateral adrenal nodular thickening..    KIDNEYS: Right renal cyst and hypodensities too small to characterize.   Nonobstructing right renal calculi. No hydronephrosis..    ABDOMINOPELVIC NODES: Unremarkable.    PELVIC ORGANS: 8 mm bladder calculus.    PERITONEUM/MESENTERY/BOWEL: Colonic diverticulosis. No evidence of bowel   obstruction ascites or free air.. 2 rounded foci in the transverse colon   (4/59-60). Differential includes adherent stool versus colonic lesions.    BONES/SOFT TISSUES: Degenerative changes of the spine.    OTHER: Atherosclerotic calcifications.    IMPRESSION:    1.8 cm left apical lung mass, suspicious for malignancy-new from neck CT   of 6/1/2023 were the lung apices were imaged. Recommend oncologic   evaluation.    Indeterminate right pulmonary nodular opacities. Recommend follow-up.    2 rounded foci in the transverse colon. Differential includes adherent   stool versus colonic lesions. Recommend follow-up with colonoscopy.    No CT evidence of acute traumatic injury in the chest abdomen or pelvis.    < end of copied text >    < from: CT Cervical Spine No Cont (01.31.24 @ 14:04) >  IMPRESSION:    CT HEAD:  No acute intracranial findings.    Stable moderate chronic microvascular ischemic changes and chronic lacunar infarcts.    CT CERVICAL SPINE:  No acute cervical fracture or facet subluxation.    < end of copied text >    < from: Xray Pelvis AP only (01.31.24 @ 13:06) >  Impression:    No acute fracture or acute articular abnormality. .    < end of copied text >    < from: Xray Chest 1 View AP/PA (01.31.24 @ 13:05) >  Impression:    No radiographic evidence of acute cardiopulmonary disease.    < end of copied text >    MEDICATIONS:    acetaminophen     Tablet .. 650 milliGRAM(s) Oral every 6 hours PRN  aspirin  chewable 81 milliGRAM(s) Oral daily  carbidopa/levodopa  25/100 2 Tablet(s) Oral <User Schedule>  carbidopa/levodopa CR 50/200 1 Tablet(s) Oral <User Schedule>  carvedilol 12.5 milliGRAM(s) Oral every 12 hours  chlorhexidine 2% Cloths 1 Application(s) Topical daily  enoxaparin Injectable 40 milliGRAM(s) SubCutaneous every 24 hours  entacapone 200 milliGRAM(s) Oral three times a day  levETIRAcetam 500 milliGRAM(s) Oral two times a day  lidocaine   4% Patch 1 Patch Transdermal daily  meclizine 12.5 milliGRAM(s) Oral two times a day  methocarbamol 750 milliGRAM(s) Oral every 6 hours  rivastigmine patch  9.5 mG/24 Hr(s) 1 Patch Transdermal every 24 hours  traMADol 50 milliGRAM(s) Oral every 8 hours PRN  traZODone 100 milliGRAM(s) Oral once PRN  zolpidem 5 milliGRAM(s) Oral at bedtime PRN

## 2024-02-01 NOTE — CONSULT NOTE ADULT - ASSESSMENT
IMPRESSIONS:     1.8 cm Left Apical Lung Mass   HO Parkinson's Disease   HO HTN   HO DM   HO CVA   COVID (+)       PLAN:   High-Risk (L)-Sided Apical Lung Mass 1.8 cm (Suspicious for Malignancy in accordance with Fleischner Criteria)  Recommend Outpatient PET/CT at 6-12 months    IMPRESSIONS:     1.8 cm Left Apical Lung Mass   Possible transverse colon lesions  HO Parkinson's Disease   HO HTN   HO DM   HO CVA   COVID (+)       PLAN:     CT chest reviewed   CORINNE apicomedial lesion   Never smoked, no history of cancer   However location and shape is suspicious   Would recommend PET CT; outpatient PFTs   Options would then include robotic bronch bx, vs IR bx, vs empiric surgical resection  Provided no other sites of uptake on the PET   Fall workup and management per neurology   COVID positive - incidental - not on O2  Outpatient follow up

## 2024-02-01 NOTE — CONSULT NOTE ADULT - SUBJECTIVE AND OBJECTIVE BOX
HPI  Patient is a 72 y/o male with PMH of Parkinson's disease (diagnosed  7 years ago, HTN, 2 x CVA (1998, 2016), DM, Seizure (2019) on keppra presenting to the ED after 2 x falls. (of note, patient was admitted for same complaints in 09/22/2022)    Patient states that he woke up around 0500 went to the bathroom and fell in the kitchen. He was unable to recall if he lost consciousness but was able to get back up and go back to the bedroom. When getting back up from bed, he fell again and was not able to get up without assistance. He cannot recall if he felt dizzy or lost consciousness He states that he was unable to move his legs. He endorses some mid-back tenderness attributed to the fall and tremor of his left hand attributed to parkinson's. He also endorses constant mild vertigo has improved since last admission in 06/2023.     Otherwise, he denies fevers, chills, SOB, cough, sputum production, night sweats, headaches, abdominal pain, diarrhea, dysuria, LE edema.     In the ED, patient tested positive for COVID. BP was 160s/50s, otherwise, vital signs were within normal limits. CBC, CMP, PT INR was unremarkable.     Trauma w/u was negative for acute traumatic injury. (CT chest, CT abdomen/pelvis, CT c-spine, CT head, xray pelvis, xray chest)    CT chest showed incidental finding of 1.8 cm left apical lung mass, suspicious for malignancy-new from neck CT of 6/1/2023.    CT abdomen showed: 2 rounded foci in the transverse colon. Differential includes adherent stool versus colonic lesions.     Patient is admitted to the floor for fall. (01-31-24 @ 20:52)      PULMONOLOGY CONSULT    PAST MEDICAL & SURGICAL HISTORY  Parkinson disease    CVA (cerebral vascular accident)    HTN (hypertension)    Diabetes mellitus    Gout    No significant past surgical history    History of appendectomy        SOCIAL HISTORY  Smoking                         ETOH                            Other    FAMILY HISTORY  No pertinent family history in first degree relatives    FH: hypertension    FH: CAD (coronary artery disease)      No cardiovascular or pulmonary family history     REVIEW OF SYSTEMS  All ROS are negative exept per HPI     ALLERGIES / INTOLERANCES  sulfa drugs (Unknown)  penicillin (Unknown)  cefaclor (Unknown)    No Known Allergies  No Known Intolerances    VITALS / PHYSICAL EXAM  T(C): 37.5 (02-01-24 @ 05:23), Max: 37.5 (02-01-24 @ 05:23)  HR: 91 (02-01-24 @ 05:23) (61 - 91)  BP: 120/81 (02-01-24 @ 05:23) (120/81 - 178/90)  RR: 18 (02-01-24 @ 05:23) (18 - 20)  SpO2: 99% (01-31-24 @ 21:06) (96% - 99%)      CONSTITUTIONAL:  Well nourished.  NAD    CARDIAC:   Normal rate,   regular rhythm.    no edema    RESPIRATORY:   No wheezing   Normal chest expansion  Not tachypneic,  No use of accessory muscles    GASTROINTESTINAL:  Abdomen soft, non-tender,   No guarding,   Positive BS    MUSCULOSKELETAL:   Range of motion is not limited,  No clubbing, cyanosis    NEUROLOGICAL:   Alert and oriented   No motor deficits.    SKIN:   Skin normal color for race,   No evidence of rash.      LABS             15.3   9.44  )-----------( 104      ( 02-01-24 @ 06:23 )             42.7     140  |  104  |  15  -------------------------<  76   02-01-24 @ 06:23  3.5  |  24  |  1.0    Ca      8.2     02-01-24 @ 06:23    TPro  6.1  /  Alb  3.5  /  TBili  0.9  /  DBili  x   /  AST  22  /  ALT  <5  /  AlkPhos  73  /  GGT  x     02-01-24 @ 06:23    PT/INR - ( 01-31-24 @ 13:17 )   PT: 12.60 sec;   INR: 1.10 ratio  PTT - ( 01-31-24 @ 13:17 )  PTT:28.6 sec                  Urinalysis Basic - ( 01 Feb 2024 06:23 )    Color: x / Appearance: x / SG: x / pH: x  Gluc: 76 mg/dL / Ketone: x  / Bili: x / Urobili: x   Blood: x / Protein: x / Nitrite: x   Leuk Esterase: x / RBC: x / WBC x   Sq Epi: x / Non Sq Epi: x / Bacteria: x            MEDICATIONS  aspirin  chewable 81 milliGRAM(s) Oral daily  carbidopa/levodopa  25/100 2 Tablet(s) Oral three times a day  carbidopa/levodopa CR 50/200 1 Tablet(s) Oral <User Schedule>  carvedilol 12.5 milliGRAM(s) Oral every 12 hours  enoxaparin Injectable 40 milliGRAM(s) SubCutaneous every 24 hours  entacapone 200 milliGRAM(s) Oral four times a day  levETIRAcetam 500 milliGRAM(s) Oral two times a day  lidocaine   4% Patch 1 Patch Transdermal daily  meclizine 12.5 milliGRAM(s) Oral two times a day  methocarbamol 750 milliGRAM(s) Oral every 6 hours  rivastigmine patch  9.5 mG/24 Hr(s) 1 Patch Transdermal every 24 hours    acetaminophen     Tablet .. 650 milliGRAM(s) Oral every 6 hours PRN  traMADol 50 milliGRAM(s) Oral every 8 hours PRN  traZODone 100 milliGRAM(s) Oral once PRN  zolpidem 5 milliGRAM(s) Oral at bedtime PRN      IMAGING  X-Rays reviewed:    INTERPRETATION:  Clinical History / Reason for exam: Trauma    Comparison : Chest radiograph 6/2/2023.    Technique/Positioning: Frontal view    Findings:    Support devices: None.    Cardiac/mediastinum/hilum: Unremarkable.    Lung parenchyma/Pleura: Within normal limits.    Skeleton/soft tissues: There are degenerative changes of both shoulders   and thoracic spine.    Impression:    No radiographic evidence of acute cardiopulmonary disease.        --- End of Report ---    CT Chest A/P:     IMPRESSION:    1.8 cm left apical lung mass, suspicious for malignancy-new from neck CT   of 6/1/2023 were the lung apices were imaged. Recommend oncologic   evaluation.    Indeterminate right pulmonary nodular opacities. Recommend follow-up.    2 rounded foci in the transverse colon. Differential includes adherent   stool versus colonic lesions. Recommend follow-up with colonoscopy.    No CT evidence of acutetraumatic injury in the chest abdomen or pelvis.

## 2024-02-01 NOTE — PROGRESS NOTE ADULT - ASSESSMENT
Patient is a 72 y/o male with PMH of Parkinson's disease (diagnosed  7 years ago, HTN, 2 x CVA (1998, 2016), DM, Seizure (2019) on keppra presenting to the ED after 2 x falls. (of note, patient was admitted for same complaints in 09/22/2022)      #Fall 2/2 suspected akinesia vs orthostatic hypotension vs syncope vs vertigo   #Parkinson's disease  #Hx of Seizure (2019)  #Hx of persistent Vertigo  - seen by ENT 06/2023  - on keppra 500mg BID, MR head negative, orthostatic vital signs negative (06/2023)  - Trauma w/u negative for acute traumatic injury.   - on sinemet, entacapone, rivastigmine    - PT eval  - Neuro consult for parkinson's medication adjustment. Patient requesting for increase in symptoms and left hand tremor.  - isolation/fall precautions  - c/w meclizine    #COVID infection  - CXR unremarkable, on RA, asymptomatic.  - ID consult for COVID    # HTN  - BP 160s/50s  - c/w carvedilol     #Apical lung mass- incidental finding  - CT chest (01/31/2024): 1.8 cm left apical lung mass, suspicious for malignancy-new from neck CT of 6/1/2023 were the lung apices were imaged. Indeterminate right pulmonary nodular opacities.   - Patient request medical w/u  - Pulm consult.    #Colonic lesion vs adherent stool  - consider GI consult for possible inpatient colonoscopy.    # Hx of CVA (1996, 2016)   - no residual deficits     # Insomnia   - c/w trazodone   - c/w Lunesta    GI prophylaxis: None  DVT prophylaxis: lovenox  Diet: Regular  Code Status: Full code  Disposition: Med/surg    pending: ID/pulm/neuro consults.     Patient is a 72 y/o male with PMH of Parkinson's disease (diagnosed  7 years ago, HTN, 2 x CVA (1998, 2016), DM, Seizure (2019) on keppra presenting to the ED after 2 x falls. (of note, patient was admitted for same complaints in 09/22/2022)      #Fall 2/2 suspected akinesia vs orthostatic hypotension vs syncope vs vertigo   #Parkinson's disease  #Hx of Seizure (2019)  #Hx of persistent Vertigo  - seen by ENT 06/2023  - on keppra 500mg BID, MR head negative, orthostatic vital signs negative (06/2023)  - Trauma w/u negative for acute traumatic injury.   - on sinemet, entacapone, rivastigmine    - PT eval -> pending  - Neuro consult recs appreciated   - isolation/fall precautions  - c/w meclizine    #COVID infection  - CXR unremarkable, on RA, asymptomatic.    # HTN  - BP 160s/50s  - c/w carvedilol     #Apical lung mass- incidental finding  - CT chest (01/31/2024): 1.8 cm left apical lung mass, suspicious for malignancy-new from neck CT of 6/1/2023 were the lung apices were imaged. Indeterminate right pulmonary nodular opacities.   - Patient request medical w/u  - Pulm consult - > pending    #Colonic lesion vs adherent stool  - consider GI consult for possible inpatient colonoscopy.    # Hx of CVA (1996, 2016)   - no residual deficits     # Insomnia   - c/w trazodone   - c/w Lunesta    GI prophylaxis: None  DVT prophylaxis: lovenox  Diet: Regular  Code Status: Full code    #Pending: Pulm and PT eval, and finalized neuro recs

## 2024-02-01 NOTE — PROGRESS NOTE ADULT - TIME BILLING
care coord    spoke w/ neuro    complex chart review     numerous problems to handle (above) care coord    spoke w/ neuro    complex chart review     numerous problems to handle (above)    updated his HCP

## 2024-02-01 NOTE — CONSULT NOTE ADULT - ASSESSMENT
ASSESSMENT  74 y/o male with PMH of Parkinson's disease (diagnosed  7 years ago, HTN, 2 x CVA (1998, 2016), DM, Seizure (2019) on keppra presenting to the ED after 2 x falls.      IMPRESSION  #COVID19, asymptomatic  CXR no PNA  CT possible lung mass  #1.8 cm left apical lung mass, suspicious for malignancy-new from neck CT   of 6/1/2023 were the lung apices were imaged. Recommend oncologic   evaluation.  #Sepsis ruled out on admission   #Abx allergy: penicillin (Unknown)  cefaclor (Unknown)  sulfa  Creatinine: 1.0 (02-01-24 @ 06:23)    Height (cm): 172.7 (01-31-24 @ 21:06)  Weight (kg): 74.7 (01-31-24 @ 21:06)    RECOMMENDATIONS  - asymptomatic, no treatment required  - please inform ID if develops any symptoms of COVID19    If any questions, please send a message or call on [employer]  Please continue to update ID with any pertinent new laboratory or radiographic findings

## 2024-02-01 NOTE — CONSULT NOTE ADULT - SUBJECTIVE AND OBJECTIVE BOX
ZAKIA MILLS  73y, Male  Allergy: sulfa drugs (Unknown)  penicillin (Unknown)  cefaclor (Unknown)      CHIEF COMPLAINT:   Fall (01 Feb 2024 13:30)      LOS  1d    HPI  HPI:  Patient is a 74 y/o male with PMH of Parkinson's disease (diagnosed  7 years ago, HTN, 2 x CVA (1998, 2016), DM, Seizure (2019) on keppra presenting to the ED after 2 x falls. (of note, patient was admitted for same complaints in 09/22/2022)    Patient states that he woke up around 0500 went to the bathroom and fell in the kitchen. He was unable to recall if he lost consciousness but was able to get back up and go back to the bedroom. When getting back up from bed, he fell again and was not able to get up without assistance. He cannot recall if he felt dizzy or lost consciousness He states that he was unable to move his legs. He endorses some mid-back tenderness attributed to the fall and tremor of his left hand attributed to parkinson's. He also endorses constant mild vertigo has improved since last admission in 06/2023.     Otherwise, he denies fevers, chills, SOB, cough, sputum production, night sweats, headaches, abdominal pain, diarrhea, dysuria, LE edema.     In the ED, patient tested positive for COVID. BP was 160s/50s, otherwise, vital signs were within normal limits. CBC, CMP, PT INR was unremarkable.     Trauma w/u was negative for acute traumatic injury. (CT chest, CT abdomen/pelvis, CT c-spine, CT head, xray pelvis, xray chest)    CT chest showed incidental finding of 1.8 cm left apical lung mass, suspicious for malignancy-new from neck CT of 6/1/2023.    CT abdomen showed: 2 rounded foci in the transverse colon. Differential includes adherent stool versus colonic lesions.     Patient is admitted to the floor for fall. (31 Jan 2024 20:52)      INFECTIOUS DISEASE HISTORY:  ID consulted for COVID , pt denies any symptoms  No SOB, rhinorrhea, sore throat, cough          PMH  PAST MEDICAL & SURGICAL HISTORY:  Parkinson disease      CVA (cerebral vascular accident)      HTN (hypertension)      Diabetes mellitus      Gout      History of appendectomy          FAMILY HISTORY  No pertinent family history in first degree relatives    FH: hypertension    FH: CAD (coronary artery disease)        SOCIAL HISTORY  Social History:  Denies EtOH, tobacco, illicit drug use. (31 Jan 2024 20:52)        ROS  General: Denies rigors, nightsweats  HEENT: Denies headache, rhinorrhea, sore throat, eye pain  CV: Denies CP, palpitations  PULM: Denies wheezing, hemoptysis  GI: Denies hematemesis, hematochezia, melena  : Denies discharge, hematuria  MSK: back pain  SKIN: Denies rash, lesions  NEURO: Denies paresthesias, weakness  PSYCH: Denies depression, anxiety     VITALS:  T(F): 97.6, Max: 99.5 (02-01-24 @ 05:23)  HR: 73  BP: 134/80  RR: 18Vital Signs Last 24 Hrs  T(C): 36.4 (01 Feb 2024 12:18), Max: 37.5 (01 Feb 2024 05:23)  T(F): 97.6 (01 Feb 2024 12:18), Max: 99.5 (01 Feb 2024 05:23)  HR: 73 (01 Feb 2024 12:18) (61 - 91)  BP: 134/80 (01 Feb 2024 12:18) (120/81 - 178/90)  BP(mean): --  RR: 18 (01 Feb 2024 12:18) (18 - 20)  SpO2: 99% (31 Jan 2024 21:06) (96% - 99%)    Parameters below as of 31 Jan 2024 21:06  Patient On (Oxygen Delivery Method): room air        PHYSICAL EXAM:  Gen: NAD, resting in bed  HEENT: Normocephalic, atraumatic  Neck: supple, no lymphadenopathy  CV: Regular rate & regular rhythm  Lungs: decreased BS at bases, no fremitus  Abdomen: Soft, BS present  Ext: Warm, well perfused  Neuro: non focal, awake  Skin: no rash, no erythema  Lines: no phlebitis     TESTS & MEASUREMENTS:                        15.3   9.44  )-----------( 104      ( 01 Feb 2024 06:23 )             42.7     02-01    140  |  104  |  15  ----------------------------<  76  3.5   |  24  |  1.0    Ca    8.2<L>      01 Feb 2024 06:23    TPro  6.1  /  Alb  3.5  /  TBili  0.9  /  DBili  x   /  AST  22  /  ALT  <5  /  AlkPhos  73  02-01      LIVER FUNCTIONS - ( 01 Feb 2024 06:23 )  Alb: 3.5 g/dL / Pro: 6.1 g/dL / ALK PHOS: 73 U/L / ALT: <5 U/L / AST: 22 U/L / GGT: x           Urinalysis Basic - ( 01 Feb 2024 06:23 )    Color: x / Appearance: x / SG: x / pH: x  Gluc: 76 mg/dL / Ketone: x  / Bili: x / Urobili: x   Blood: x / Protein: x / Nitrite: x   Leuk Esterase: x / RBC: x / WBC x   Sq Epi: x / Non Sq Epi: x / Bacteria: x        Culture - Urine (collected 06-03-23 @ 13:00)  Source: Clean Catch Clean Catch (Midstream)  Final Report (06-04-23 @ 19:31):    <10,000 CFU/mL Normal Urogenital Poly    Culture - Blood (collected 06-02-23 @ 11:36)  Source: .Blood None  Final Report (06-07-23 @ 23:00):    No Growth Final    Culture - Blood (collected 06-02-23 @ 11:36)  Source: .Blood None  Final Report (06-07-23 @ 23:00):    No Growth Final        Lactate, Blood: 1.5 mmol/L (01-31-24 @ 13:17)      INFECTIOUS DISEASES TESTING  Rapid RVP Result: NotDetec (06-02-23 @ 14:10)      INFLAMMATORY MARKERS      RADIOLOGY & ADDITIONAL TESTS:  I have personally reviewed the last Chest xray  CXR  Xray Chest 1 View AP/PA:   ACC: [id number] EXAM:  XR CHEST 1 VIEW   ORDERED BY: REA OSORIO     PROCEDURE DATE:  01/31/2024          INTERPRETATION:  Clinical History / Reason for exam: Trauma    Comparison : Chest radiograph 6/2/2023.    Technique/Positioning: Frontal view    Findings:    Support devices: None.    Cardiac/mediastinum/hilum: Unremarkable.    Lung parenchyma/Pleura: Within normal limits.    Skeleton/soft tissues: There are degenerative changes of both shoulders   and thoracic spine.    Impression:    No radiographic evidence of acute cardiopulmonary disease.        --- End of Report ---            MARGARITA LEMPERT MD; Attending Radiologist  This document has been electronically signed. Jan 31 2024  1:14PM (01-31-24 @ 13:05)      CT  CT Chest w/ IV Cont:   ACC: [id number] EXAM:  CT CHEST IC   ORDERED BY: REA OSORIO     ACC: [id number] EXAM:  CT ABDOMEN AND PELVIS IC   ORDERED BY: REA OSORIO     PROCEDURE DATE:  01/31/2024          INTERPRETATION:  CLINICAL STATEMENT: Trauma    TECHNIQUE: Contiguous axial CT images were obtained from the thoracic   inlet to the pubic symphysis following administration of intravenous   contrast. 100 cc Omnipaque 350 administered.  Oral contrast was not   administered.  Reformatted images in the coronal and sagittal planes, as   well as MIP reconstructed images, were acquired.    COMPARISON CT: None.        FINDINGS:    CHEST:    LUNGS/PLEURA/AIRWAYS: Limited evaluation due to motion. 1.8 cm left   apical mass (6/46), new from neck CT of 6/1/2023, where the lung apices   were imaged. Additional indeterminate right-sided nodular opacities   measuring up to 6 mm , for example (6/98). Lateral atelectatic changes.   No pleural effusion or pneumothorax.    MEDIASTINUM/THORACIC NODES: No lymphadenopathy..    HEART/GREAT VESSELS: Coronary and aortic calcifications. No pericardial   effusion.. Ectatic ascending thoracic aorta measuring up to 4.2 cm.      ABDOMEN/PELVIS:    HEPATOBILIARY: Cholelithiasis.    SPLEEN: Unremarkable.    PANCREAS: Unremarkable.    ADRENAL GLANDS: Bilateral adrenal nodular thickening..    KIDNEYS: Right renal cyst and hypodensities too small to characterize.   Nonobstructing right renal calculi. No hydronephrosis..    ABDOMINOPELVIC NODES: Unremarkable.    PELVIC ORGANS: 8 mm bladder calculus.    PERITONEUM/MESENTERY/BOWEL: Colonic diverticulosis. No evidence of bowel   obstruction ascites or free air.. 2 rounded foci in the transverse colon   (4/59-60). Differential includes adherent stool versus colonic lesions.    BONES/SOFT TISSUES: Degenerative changes of the spine.    OTHER: Atherosclerotic calcifications.      IMPRESSION:    1.8 cm left apical lung mass, suspicious for malignancy-new from neck CT   of 6/1/2023 were the lung apices were imaged. Recommend oncologic   evaluation.    Indeterminate right pulmonary nodular opacities. Recommend follow-up.    2 rounded foci in the transverse colon. Differential includes adherent   stool versus colonic lesions. Recommend follow-up with colonoscopy.    No CT evidence of acutetraumatic injury in the chest abdomen or pelvis.              Other findings in the body of the report.    --- End of Report ---            JUANITA CORADO MD; Attending Radiologist  This document has been electronically signed. Jan 31 2024  3:33PM (01-31-24 @ 14:11)      CARDIOLOGY TESTING      MEDICATIONS  aspirin  chewable 81 Oral daily  carbidopa/levodopa  25/100 2 Oral <User Schedule>  carbidopa/levodopa CR 50/200 1 Oral <User Schedule>  carvedilol 12.5 Oral every 12 hours  chlorhexidine 2% Cloths 1 Topical daily  enoxaparin Injectable 40 SubCutaneous every 24 hours  entacapone 200 Oral three times a day  levETIRAcetam 500 Oral two times a day  lidocaine   4% Patch 1 Transdermal daily  meclizine 12.5 Oral two times a day  methocarbamol 750 Oral every 6 hours  rivastigmine patch  9.5 mG/24 Hr(s) 1 Transdermal every 24 hours        ANTIBIOTICS:      ALLERGIES:  sulfa drugs (Unknown)  penicillin (Unknown)  cefaclor (Unknown)

## 2024-02-01 NOTE — CONSULT NOTE ADULT - SUBJECTIVE AND OBJECTIVE BOX
NEUROLOGY CONSULT    HPI:  Patient is a 74 y/o male with PMH of Parkinson's disease (diagnosed  7 years ago, HTN, 2 x CVA (1998, 2016), DM, Seizure (2019) on keppra presenting to the ED after a fall.      Per chart review:  Patient states that he woke up around 0500 went to the bathroom and fell in the kitchen. He was unable to recall if he lost consciousness but was able to get back up and go back to the bedroom. When getting back up from bed, he fell again and was not able to get up without assistance. He cannot recall if he felt dizzy or lost consciousness He states that he was unable to move his legs. He endorses some mid-back tenderness attributed to the fall and tremor of his left hand attributed to parkinson's. He also endorses constant mild vertigo has improved since last admission in 06/2023.     Per patient:  On evaluation he is slow to answer questions and gives limited responses. He says that he does not remember the details around the fall. He is stiff and slow to answer on exam, but is able to tell me that they did not give him any of his PD medications in the ER. I spoke with Shanon Aguila. She says the NH gives him his medications and is not concerned about missing doses. She says that he has seemed to decline physically and mentally. He participates in PT regularly but says he has lost interest recently. She thinks he has told her that he is stiff when he wakes up but doesn't think he complained of any wearing off. She says she does not think he has had a seizure since 2019. She does not remember who his neurologist is, but says they are looking for a new one.     MEDICATIONS  Home Medications:  aspirin 81 mg oral delayed release tablet: 1 tab(s) orally once a day (31 Jan 2024 22:27)  carbidopa-levodopa 25 mg-100 mg oral tablet: 2 tab(s) orally 3 times a day (31 Jan 2024 22:27)  entacapone 200 mg oral tablet: 1 tab(s) orally 4 times a day with sinemet (31 Jan 2024 22:27)  levETIRAcetam 500 mg oral tablet: 1 tab(s) orally 2 times a day (31 Jan 2024 22:27)  Lunesta 1 mg oral tablet: 1 tab(s) orally once a day (at bedtime) (31 Jan 2024 22:27)  meclizine 12.5 mg oral tablet: 1 tab(s) orally 2 times a day (31 Jan 2024 22:27)  rivastigmine 9.5 mg/24 hr transdermal film, extended release: 1 patch transdermal every 24 hours (31 Jan 2024 22:27)  traZODone 100 mg oral tablet: 1 tab(s) orally once a day (31 Jan 2024 22:27)    MEDICATIONS  (STANDING):  aspirin  chewable 81 milliGRAM(s) Oral daily  carbidopa/levodopa  25/100 2 Tablet(s) Oral three times a day  carvedilol 12.5 milliGRAM(s) Oral every 12 hours  enoxaparin Injectable 40 milliGRAM(s) SubCutaneous every 24 hours  entacapone 200 milliGRAM(s) Oral four times a day  levETIRAcetam 500 milliGRAM(s) Oral two times a day  meclizine 12.5 milliGRAM(s) Oral two times a day    MEDICATIONS  (PRN):  acetaminophen     Tablet .. 650 milliGRAM(s) Oral every 6 hours PRN Mild Pain (1 - 3)  traZODone 100 milliGRAM(s) Oral once PRN insomnia  zolpidem 5 milliGRAM(s) Oral at bedtime PRN Insomnia      FAMILY HISTORY:  FH: hypertension    FH: CAD (coronary artery disease)      SOCIAL HISTORY: negative for tobacco, alcohol, or ilicit drug use.    Allergies    sulfa drugs (Unknown)  penicillin (Unknown)  cefaclor (Unknown)    Intolerances        Physical exam:  Constitutional: No acute distress.      Neurologic:  -Mental status: Awake, alert, oriented to person only. Bradyphrenic and hypophonic with masked facies. Tearful, answering limited questions.  -Cranial nerves:   II: BTT present b/l  III, IV, VI: Extraocular movements are intact without nystagmus. Pupils equally round and reactive to light  V:  Facial sensation V1-V3 equal and intact   VII: Face is symmetric with normal eye closure and smile  VIII: Hearing is bilaterally intact to finger rub  IX, X: Uvula is midline and soft palate rises symmetrically  XI: Head turning and shoulder shrug are intact.  XII: Tongue protrudes midline  Motor: Normal bulk and tone. No pronator drift. Strength bilateral upper extremity 5/5, bilateral lower extremities 5/5.  Decreased finger taps L>R. Cogwheeling b/l L>R. Pill-rolling tremor b/l L>R.  Sensation: Intact to light touch bilaterally.  Coordination: No dysmetria on finger-to-nose bilaterally  Reflexes: 1+ throughout  Gait: deferred      LABS:                        15.0   8.62  )-----------( 109      ( 31 Jan 2024 13:17 )             40.8     01-31    136  |  101  |  11  ----------------------------<  89  3.6   |  27  |  1.0    Ca    8.4      31 Jan 2024 13:17    TPro  6.1  /  Alb  3.6  /  TBili  1.0  /  DBili  x   /  AST  17  /  ALT  <5  /  AlkPhos  77  01-31    Hemoglobin A1C:   Vitamin B12   PT/INR - ( 31 Jan 2024 13:17 )   PT: 12.60 sec;   INR: 1.10 ratio         PTT - ( 31 Jan 2024 13:17 )  PTT:28.6 sec  CAPILLARY BLOOD GLUCOSE      POCT Blood Glucose.: 116 mg/dL (31 Jan 2024 21:36)      Urinalysis Basic - ( 31 Jan 2024 13:17 )    Color: x / Appearance: x / SG: x / pH: x  Gluc: 89 mg/dL / Ketone: x  / Bili: x / Urobili: x   Blood: x / Protein: x / Nitrite: x   Leuk Esterase: x / RBC: x / WBC x   Sq Epi: x / Non Sq Epi: x / Bacteria: x            Microbiology:      RADIOLOGY, EKG AND ADDITIONAL TESTS: Reviewed.           NEUROLOGY CONSULT    HPI:  Patient is a 74 y/o male with PMH of Parkinson's disease (diagnosed  7 years ago, HTN, 2 x CVA (1998, 2016), DM, Seizure (2019) on keppra presenting to the ED after a fall.      Per chart review:  Patient states that he woke up around 0500 went to the bathroom and fell in the kitchen. He was unable to recall if he lost consciousness but was able to get back up and go back to the bedroom. When getting back up from bed, he fell again and was not able to get up without assistance. He cannot recall if he felt dizzy or lost consciousness He states that he was unable to move his legs. He endorses some mid-back tenderness attributed to the fall and tremor of his left hand attributed to parkinson's. He also endorses constant mild vertigo has improved since last admission in 06/2023.     Per patient:  On evaluation he is slow to answer questions and gives limited responses. He says that he does not remember the details around the fall. He is stiff and slow to answer on exam, but is able to tell me that they did not give him any of his PD medications in the ER. I spoke with Shanon Aguila. She says the NH gives him his medications and is not concerned about missing doses. She says that he has seemed to decline physically and mentally. He participates in PT regularly but says he has lost interest recently. She thinks he has told her that he is stiff when he wakes up but doesn't think he complained of any wearing off. She says she does not think he has had a seizure since 2019. She does not remember who his neurologist is, but says they are looking for a new one.     MEDICATIONS  Home Medications:  aspirin 81 mg oral delayed release tablet: 1 tab(s) orally once a day (31 Jan 2024 22:27)  carbidopa-levodopa 25 mg-100 mg oral tablet: 2 tab(s) orally 3 times a day (31 Jan 2024 22:27)  entacapone 200 mg oral tablet: 1 tab(s) orally 4 times a day with sinemet (31 Jan 2024 22:27)  levETIRAcetam 500 mg oral tablet: 1 tab(s) orally 2 times a day (31 Jan 2024 22:27)  Lunesta 1 mg oral tablet: 1 tab(s) orally once a day (at bedtime) (31 Jan 2024 22:27)  meclizine 12.5 mg oral tablet: 1 tab(s) orally 2 times a day (31 Jan 2024 22:27)  rivastigmine 9.5 mg/24 hr transdermal film, extended release: 1 patch transdermal every 24 hours (31 Jan 2024 22:27)  traZODone 100 mg oral tablet: 1 tab(s) orally once a day (31 Jan 2024 22:27)    MEDICATIONS  (STANDING):  aspirin  chewable 81 milliGRAM(s) Oral daily  carbidopa/levodopa  25/100 2 Tablet(s) Oral three times a day  carvedilol 12.5 milliGRAM(s) Oral every 12 hours  enoxaparin Injectable 40 milliGRAM(s) SubCutaneous every 24 hours  entacapone 200 milliGRAM(s) Oral four times a day  levETIRAcetam 500 milliGRAM(s) Oral two times a day  meclizine 12.5 milliGRAM(s) Oral two times a day    MEDICATIONS  (PRN):  acetaminophen     Tablet .. 650 milliGRAM(s) Oral every 6 hours PRN Mild Pain (1 - 3)  traZODone 100 milliGRAM(s) Oral once PRN insomnia  zolpidem 5 milliGRAM(s) Oral at bedtime PRN Insomnia      FAMILY HISTORY:  FH: hypertension    FH: CAD (coronary artery disease)      SOCIAL HISTORY: negative for tobacco, alcohol, or ilicit drug use.    Allergies    sulfa drugs (Unknown)  penicillin (Unknown)  cefaclor (Unknown)    Intolerances        Physical exam:  Constitutional: No acute distress.      Neurologic:  -Mental status: Awake, alert, oriented to person only. Bradyphrenic and hypophonic with masked facies. Tearful, answering limited questions.  -Cranial nerves:   II: Visual fields full ton confrontation  III, IV, VI: Extraocular movements are intact without nystagmus. Pupils equally round and reactive to light  V:  Facial sensation V1-V3 equal and intact   VII: Face is symmetric with normal eye closure and smile  VIII: Hearing is bilaterally intact to finger rub  IX, X: Uvula is midline and soft palate rises symmetrically  XI: Head turning and shoulder shrug are intact.  XII: Tongue protrudes midline  Motor: Normal bulk and tone. No pronator drift. Strength bilateral upper extremity 5/5, bilateral lower extremities 5/5.  Decreased finger taps L>R. Cogwheeling b/l L>R. Pill-rolling tremor b/l L>R.  Sensation: Intact to light touch bilaterally.  Coordination: No dysmetria on finger-to-nose bilaterally  Reflexes: 1+ throughout  Gait: deferred      LABS:                        15.0   8.62  )-----------( 109      ( 31 Jan 2024 13:17 )             40.8     01-31    136  |  101  |  11  ----------------------------<  89  3.6   |  27  |  1.0    Ca    8.4      31 Jan 2024 13:17    TPro  6.1  /  Alb  3.6  /  TBili  1.0  /  DBili  x   /  AST  17  /  ALT  <5  /  AlkPhos  77  01-31    Hemoglobin A1C:   Vitamin B12   PT/INR - ( 31 Jan 2024 13:17 )   PT: 12.60 sec;   INR: 1.10 ratio         PTT - ( 31 Jan 2024 13:17 )  PTT:28.6 sec  CAPILLARY BLOOD GLUCOSE      POCT Blood Glucose.: 116 mg/dL (31 Jan 2024 21:36)      Urinalysis Basic - ( 31 Jan 2024 13:17 )    Color: x / Appearance: x / SG: x / pH: x  Gluc: 89 mg/dL / Ketone: x  / Bili: x / Urobili: x   Blood: x / Protein: x / Nitrite: x   Leuk Esterase: x / RBC: x / WBC x   Sq Epi: x / Non Sq Epi: x / Bacteria: x            Microbiology:      RADIOLOGY, EKG AND ADDITIONAL TESTS: Reviewed.

## 2024-02-01 NOTE — CONSULT NOTE ADULT - ASSESSMENT
Patient is a 72 y/o male with PMH of Parkinson's disease (diagnosed  7 years ago, HTN, 2 x CVA (1998, 2016), DM, Seizure (2019) on keppra presenting to the ED after a fall. Fall was unwitnessed and patient does not recall details surrounding event. He is markedly rigid on exam, though he was not given his PD medications in the ER prior to admission. He will require longitudinal exams without disruptions to his regiment to determine if his current regiment requires adjustment.  Regarding the fall, there is no tongue bite, no lactic acidosis or leukocytosis, no known incontinence to suggest seizure and his medications are regulated by his NH so he is likely compliant with Keppra. However he is COVID+ and infection may lower his seizure threshold.     Recommendations:  Obtain orthostatics  Obtain rEEG  PT/OT  Consider cardiac work-up for syncope  Would provide referral to movement specialist - Dr. Nicholas or Dr. Garnica Patient is a 72 y/o male with PMH of Parkinson's disease (diagnosed  7 years ago, HTN, 2 x CVA (1998, 2016), DM, Seizure (2019) on keppra presenting to the ED after a fall. Fall was unwitnessed and patient does not recall details surrounding event. He is markedly rigid on exam, though he was not given his PD medications in the ER prior to admission. He will require longitudinal exams without disruptions to his regiment to determine if his current regiment requires adjustment.  Regarding the fall, there is no tongue bite, no lactic acidosis or leukocytosis, no known incontinence to suggest seizure and his medications are regulated by his NH so he is likely compliant with Keppra. However infection may lower his seizure threshold.     Recommendations:  Obtain orthostatics  Obtain rEEG  PT/OT  Consider cardiac work-up for syncope  Would switche/discontinue Ambien. If "Z" drug needed, consider Sonata due to decreased risk of fall  Would provide referral to movement specialist - Dr. Nicholas or Dr. Garnica Patient is a 72 y/o male with PMH of Parkinson's disease (diagnosed  7 years ago, HTN, 2 x CVA (1998, 2016), DM, Seizure (2019) on keppra presenting to the ED after a fall. Fall was unwitnessed and patient does not recall details surrounding event. He is markedly rigid on exam, though he was not given his PD medications in the ER prior to admission. He will require longitudinal exams without disruptions to his regiment to determine if his current regiment requires adjustment.  Regarding the fall, there is no tongue bite, no lactic acidosis or leukocytosis, no known incontinence to suggest seizure and his medications are regulated by his NH so he is likely compliant with Keppra. EEG in the past have been unremarkable.    Recommendations:  Obtain orthostatics  Switch Entacapone to TID w/Sinemet  Time Sinemet 7am, 1pm, 6pm.   Add Sinemet CR 25/100 at bedtime or midnight to reduce morning stiffness  Avoid anti-dopaminergic such as anti-psychotics, reglan.  PT/OT  Consider cardiac work-up for syncope  Would switche/discontinue Ambien. If "Z" drug needed, consider Sonata due to decreased risk of fall  Would provide referral to movement specialist - Dr. Nicholas or Dr. Garnica Patient is a 72 y/o male with PMH of Parkinson's disease (diagnosed  7 years ago, HTN, 2 x CVA (1998, 2016), DM, Seizure (2019) on keppra presenting to the ED after a fall. Fall was unwitnessed and patient does not recall details surrounding event. He is markedly rigid on exam, though he was not given his PD medications in the ER prior to admission. He will require longitudinal exams without disruptions to his regiment to determine if his current regiment requires adjustment.  Regarding the fall, there is no tongue bite, no lactic acidosis or leukocytosis, no known incontinence to suggest seizure and his medications are regulated by his NH so he is likely compliant with Keppra. EEG in the past have been unremarkable.    Recommendations:  Obtain orthostatics  Switch Entacapone to TID w/Sinemet  Time Sinemet 7am, 1pm, 6pm.   Add Sinemet CR 25/100 at bedtime or midnight to reduce morning stiffness  Avoid anti-dopaminergic such as anti-psychotics, reglan.  PT/OT  Consider cardiac work-up for syncope  Would switche/discontinue Ambien. If "Z" drug needed, consider Sonata due to decreased risk of fall  Family is looking for new neurologist, can provide information to movement specialist - Dr. Nicholas or Dr. Garnica Patient is a 74 y/o male with PMH of Parkinson's disease (diagnosed  7 years ago, HTN, 2 x CVA (1998, 2016), DM, Seizure (2019) on keppra presenting to the ED after a fall. Fall was unwitnessed and patient does not recall details surrounding event. He is markedly rigid on exam, though he was not given his PD medications in the ER prior to admission. He will require longitudinal exams without disruptions to his regiment to determine if his current regiment requires adjustment.  Regarding the fall, there is no tongue bite, no lactic acidosis or leukocytosis, no known incontinence to suggest seizure and his medications are regulated by his NH so he is likely compliant with Keppra. EEG in the past have been unremarkable.    Recommendations:  Obtain orthostatics  Switch Entacapone to TID w/Sinemet  Time Sinemet 7am, 1pm, 6pm.   Add Sinemet CR 25/100 at bedtime or midnight to reduce morning stiffness  Avoid anti-dopaminergic such as anti-psychotics, reglan.  PT/OT  Consider cardiac work-up for syncope  Would switch/discontinue Ambien. If "Z" drug needed, consider Sonata due to decreased risk of fall  Family is looking for new neurologist, can provide information to movement specialist - Dr. Nicholas or Dr. Garnica after discharge  Consider [facility] if family amenable - [phone]

## 2024-02-01 NOTE — PROGRESS NOTE ADULT - ASSESSMENT
Patient is a 74 y/o man with PMHx of Parkinson's disease (diagnosed 7 years ago, HTN, 2 x CVA (1998, 2016), DM, Seizure (2019) on keppra presenting to the ED after 2 x falls. (of note, patient was admitted for same complaints in 09/22/2022)    # Fall 2/2 suspected akinesia from Parkinson's disease; Hx of Seizure (2019); hx vertigo  r/o orthostatic hypotension  no syncope  no vertigo   seen by ENT 06/2023: MR head negative, orthostatic vital signs negative (06/2023)  Trauma w/u negative for acute traumatic injury.   neuro feels he is having morning freeze episodes 2/2 wearing off of sinemet because of lack of long-acting version at bedtime  make sinemet 25/100 2 tab at 7am, 12 pm and 5pm  make sinemet ER 50/200 1 tab HS (10pm)  c/w entacapone, rivastigmine    PT eval  neuro requesting pt go for rehab at [facility]: [phone]  fall precautions  c/w meclizine 12.5 q12    # Hx of CVA (1996, 2016)   CT H noted: stable mod chr microvasc changes and chr lacunar infarcts  c/w asa 81 q24  not on statin - check lipid panel    # hx Sz  on keppra 500mg BID,     # COVID infection; thrombocytopenia  quarantine 1/31-2/9  largely asymp  I do NOT believe this is cause of leg weakness  CXR unremarkable  on RA  no need for ID consult  plt are usually nl (as of 6/2023)  plt should get better on their own  ok to use DVT ppx    # lumbar back pain after fall  no trauma seen on CT A/P  no further imaging needed for now  no further w/u  PT eval  lido patch top q24  robaxin 750mg po q6  tramadol 50mg po q8 prn (do not raise dose)    # HTN  c/w carvedilol 12.5 q12    # Insomnia   c/w Lunesta (use ambien here)    # Apical lung mass- incidental finding  CT chest (01/31/2024): 1.8 cm left apical lung mass, suspicious for malignancy-new from neck CT of 6/1/2023 were the lung apices were imaged. Indeterminate right pulmonary nodular opacities.   Pulm consult: noted  agree, no reason to bx inpt, vita w/ COVID +; do outpt PET scan and plan for outpt bx after COVID past 2 wks.    # Colonic lesion vs adherent stool  there is no rush to eval lesions that could simply be stool  outpt GI consult for colonoscopy once pt is well and past COVID quarantine    # asymp GB stones  no further intervention    # rt renal calc and 8mm bladder calc; rt renal cyst  outpt uro eval    # GI prophylaxis: None    # DVT prophylaxis: lovenox 40 q24    # Activity: need PT for back pain; WBAT;     # Code Status: Full code    Dispo: PT eval; adj PD meds; tx back pain; COVID quarantine to 2/9; lipid;   eventually, pt will need STR - we prefer [facility] - f/u CM - anticipate d/c in 24-48 hrs?  outpt pulm f/u and PET scan; outpt uro f/u re stones; outpt GI eval for c-scope; outpt neuro f/u re PD; outpt IM f/u   Patient is a 72 y/o man with PMHx of Parkinson's disease (diagnosed 7 years ago, HTN, 2 x CVA (1998, 2016), DM, Seizure (2019) on keppra presenting to the ED after 2 x falls. 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 He has a brilliant mind. He has never smoked, drank or used and drugs in his entire life. He has lead a truly remarkable life.    # Fall 2/2 suspected akinesia from Parkinson's disease; Hx of Seizure (2019); hx vertigo  r/o orthostatic hypotension  no syncope  no vertigo   seen by ENT 06/2023: MR head negative, orthostatic vital signs negative (06/2023)  Trauma w/u negative for acute traumatic injury.   neuro feels he is having morning freeze episodes 2/2 wearing off of sinemet because of lack of long-acting version at bedtime  make sinemet 25/100 2 tab at 7am, 12 pm and 5pm  make sinemet ER 50/200 1 tab HS (10pm)  c/w entacapone, rivastigmine    PT eval  neuro requesting pt go for rehab at [facility]: [phone]  fall precautions  c/w meclizine 12.5 q12    # Hx of CVA (1996, 2016)   CT H noted: stable mod chr microvasc changes and chr lacunar infarcts  c/w asa 81 q24  not on statin - check lipid panel    # hx Sz  on keppra 500mg BID,     # COVID infection; thrombocytopenia  quarantine 1/31-2/9  largely asymp  I do NOT believe this is cause of leg weakness  CXR unremarkable  on RA  no need for ID consult  plt are usually nl (as of 6/2023)  plt should get better on their own  ok to use DVT ppx    # lumbar back pain after fall  no trauma seen on CT A/P  no further imaging needed for now  no further w/u  PT eval  lido patch top q24  robaxin 750mg po q6  tramadol 50mg po q8 prn (do not raise dose)    # HTN  c/w carvedilol 12.5 q12    # hx gout  restart allopurinol 200mg po q24  check uric acid in am    # Insomnia   c/w Lunesta (use ambien here)    # Apical lung mass- incidental finding  CT chest (01/31/2024): 1.8 cm left apical lung mass, suspicious for malignancy-new from neck CT of 6/1/2023 were the lung apices were imaged. Indeterminate right pulmonary nodular opacities.   Pulm consult: noted  agree, no reason to bx inpt, vita w/ COVID +; do outpt PET scan and plan for outpt bx after COVID past 2 wks.    # Colonic lesion vs adherent stool  there is no rush to eval lesions that could simply be stool  outpt GI consult for colonoscopy once pt is well and past COVID quarantine    # asymp GB stones  no further intervention    # rt renal calc and 8mm bladder calc; rt renal cyst; hx urine incontinence   outpt uro eval    # GI prophylaxis: None    # DVT prophylaxis: lovenox 40 q24    # Activity: need PT for back pain; WBAT;     # Code Status: Full code    # SENG, Shanon Aguila ([phone]): gave full update via phone    Dispo: PT eval; adj PD meds; tx back pain; COVID quarantine to 2/9; lipid; uric acid  eventually, pt will need STR - we prefer [facility] - f/u CM - anticipate d/c in 24-48 hrs?  outpt pulm f/u and PET scan; outpt uro f/u re stones; outpt GI eval for c-scope; outpt neuro f/u re PD; outpt IM f/u   Patient is a 74 y/o man with PMHx of Parkinson's disease (diagnosed 7 years ago, HTN, 2 x CVA (1998, 2016), DM, Seizure (2019) on keppra presenting to the ED after 2 x falls. 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 He has a brilliant mind. He has never smoked, drank or used and drugs in his entire life. He has lead a truly remarkable life.    # Fall 2/2 suspected akinesia from Parkinson's disease; Hx of Seizure (2019); hx vertigo  r/o orthostatic hypotension  no syncope  no vertigo   seen by ENT 06/2023: MR head negative, orthostatic vital signs negative (06/2023)  Trauma w/u negative for acute traumatic injury.   neuro feels he is having morning freeze episodes 2/2 wearing off of sinemet because of lack of long-acting version at bedtime  make sinemet 25/100 2 tab at 7am, 12 pm and 5pm  make sinemet ER 50/200 1 tab HS (10pm)  c/w entacapone, rivastigmine    PT eval  neuro requesting pt go for rehab at [facility]: [phone]  fall precautions  c/w meclizine 12.5 q12    # Hx of CVA (1996, 2016)   CT H noted: stable mod chr microvasc changes and chr lacunar infarcts  c/w asa 81 q24  not on statin - check lipid panel    # hx Sz  on keppra 500mg BID,     # COVID infection; thrombocytopenia  quarantine 1/31-2/9  largely asymp  I do NOT believe this is cause of leg weakness  CXR unremarkable  on RA  no need for ID consult  plt are usually nl (as of 6/2023)  plt should get better on their own  ok to use DVT ppx    # lumbar back pain after fall  no trauma seen on CT A/P  no further imaging needed for now  no further w/u  PT eval  lido patch top q24  robaxin 750mg po q6  tramadol 50mg po q8 prn (do not raise dose)    # HTN  c/w carvedilol 12.5 q12    # hx gout  restart allopurinol 200mg po q24  check uric acid in am    # Insomnia   c/w Lunesta (use ambien here)    # Apical lung mass- incidental finding  CT chest (01/31/2024): 1.8 cm left apical lung mass, suspicious for malignancy-new from neck CT of 6/1/2023 were the lung apices were imaged. Indeterminate right pulmonary nodular opacities.   Pulm consult: noted  agree, no reason to bx inpt, vita w/ COVID +; do outpt PET scan and plan for outpt bx after COVID past 2 wks.    # Colonic lesion vs adherent stool  there is no rush to eval lesions that could simply be stool  outpt GI consult for colonoscopy once pt is well and past COVID quarantine    # hx of obesity; s/p lap band in past, which became infected at one point and needed surgery to fix it    # asymp GB stones  no further intervention    # rt renal calc and 8mm bladder calc; rt renal cyst; hx urine incontinence   outpt uro eval    # GI prophylaxis: None    # DVT prophylaxis: lovenox 40 q24    # Activity: need PT for back pain; WBAT;     # Code Status: Full code    # SENG, Shanon Aguila ([phone]): gave full update via phone    Dispo: PT eval; adj PD meds; tx back pain; COVID quarantine to 2/9; lipid; uric acid  eventually, pt will need STR - we prefer [facility] - f/u CM - anticipate d/c in 24-48 hrs?  outpt pulm f/u and PET scan; outpt uro f/u re stones; outpt GI eval for c-scope; outpt neuro f/u re PD; outpt IM f/u

## 2024-02-02 LAB
ALBUMIN SERPL ELPH-MCNC: 3.5 G/DL — SIGNIFICANT CHANGE UP (ref 3.5–5.2)
ALBUMIN SERPL ELPH-MCNC: 3.6 G/DL — SIGNIFICANT CHANGE UP (ref 3.5–5.2)
ALP SERPL-CCNC: 75 U/L — SIGNIFICANT CHANGE UP (ref 30–115)
ALP SERPL-CCNC: 76 U/L — SIGNIFICANT CHANGE UP (ref 30–115)
ALT FLD-CCNC: 8 U/L — SIGNIFICANT CHANGE UP (ref 0–41)
ALT FLD-CCNC: <5 U/L — SIGNIFICANT CHANGE UP (ref 0–41)
ANION GAP SERPL CALC-SCNC: 14 MMOL/L — SIGNIFICANT CHANGE UP (ref 7–14)
AST SERPL-CCNC: 26 U/L — SIGNIFICANT CHANGE UP (ref 0–41)
AST SERPL-CCNC: 28 U/L — SIGNIFICANT CHANGE UP (ref 0–41)
BASOPHILS # BLD AUTO: 0.01 K/UL — SIGNIFICANT CHANGE UP (ref 0–0.2)
BASOPHILS NFR BLD AUTO: 0.1 % — SIGNIFICANT CHANGE UP (ref 0–1)
BILIRUB DIRECT SERPL-MCNC: 0.3 MG/DL — SIGNIFICANT CHANGE UP (ref 0–0.3)
BILIRUB INDIRECT FLD-MCNC: 0.8 MG/DL — SIGNIFICANT CHANGE UP (ref 0.2–1.2)
BILIRUB SERPL-MCNC: 1.1 MG/DL — SIGNIFICANT CHANGE UP (ref 0.2–1.2)
BILIRUB SERPL-MCNC: 1.1 MG/DL — SIGNIFICANT CHANGE UP (ref 0.2–1.2)
BUN SERPL-MCNC: 12 MG/DL — SIGNIFICANT CHANGE UP (ref 10–20)
CALCIUM SERPL-MCNC: 8.3 MG/DL — LOW (ref 8.4–10.5)
CHLORIDE SERPL-SCNC: 103 MMOL/L — SIGNIFICANT CHANGE UP (ref 98–110)
CHOLEST SERPL-MCNC: 144 MG/DL — SIGNIFICANT CHANGE UP
CO2 SERPL-SCNC: 22 MMOL/L — SIGNIFICANT CHANGE UP (ref 17–32)
CREAT SERPL-MCNC: 0.9 MG/DL — SIGNIFICANT CHANGE UP (ref 0.7–1.5)
CREAT SERPL-MCNC: 1 MG/DL — SIGNIFICANT CHANGE UP (ref 0.7–1.5)
EGFR: 80 ML/MIN/1.73M2 — SIGNIFICANT CHANGE UP
EGFR: 91 ML/MIN/1.73M2 — SIGNIFICANT CHANGE UP
EOSINOPHIL # BLD AUTO: 0 K/UL — SIGNIFICANT CHANGE UP (ref 0–0.7)
EOSINOPHIL NFR BLD AUTO: 0 % — SIGNIFICANT CHANGE UP (ref 0–8)
GLUCOSE SERPL-MCNC: 87 MG/DL — SIGNIFICANT CHANGE UP (ref 70–99)
HCT VFR BLD CALC: 46.1 % — SIGNIFICANT CHANGE UP (ref 42–52)
HDLC SERPL-MCNC: 46 MG/DL — SIGNIFICANT CHANGE UP
HGB BLD-MCNC: 16.5 G/DL — SIGNIFICANT CHANGE UP (ref 14–18)
IMM GRANULOCYTES NFR BLD AUTO: 0.3 % — SIGNIFICANT CHANGE UP (ref 0.1–0.3)
LIPID PNL WITH DIRECT LDL SERPL: 80 MG/DL — SIGNIFICANT CHANGE UP
LYMPHOCYTES # BLD AUTO: 1.68 K/UL — SIGNIFICANT CHANGE UP (ref 1.2–3.4)
LYMPHOCYTES # BLD AUTO: 16.5 % — LOW (ref 20.5–51.1)
MAGNESIUM SERPL-MCNC: 1.9 MG/DL — SIGNIFICANT CHANGE UP (ref 1.8–2.4)
MCHC RBC-ENTMCNC: 34 PG — HIGH (ref 27–31)
MCHC RBC-ENTMCNC: 35.8 G/DL — SIGNIFICANT CHANGE UP (ref 32–37)
MCV RBC AUTO: 95.1 FL — HIGH (ref 80–94)
MONOCYTES # BLD AUTO: 1.03 K/UL — HIGH (ref 0.1–0.6)
MONOCYTES NFR BLD AUTO: 10.1 % — HIGH (ref 1.7–9.3)
NEUTROPHILS # BLD AUTO: 7.43 K/UL — HIGH (ref 1.4–6.5)
NEUTROPHILS NFR BLD AUTO: 73 % — SIGNIFICANT CHANGE UP (ref 42.2–75.2)
NON HDL CHOLESTEROL: 98 MG/DL — SIGNIFICANT CHANGE UP
NRBC # BLD: 0 /100 WBCS — SIGNIFICANT CHANGE UP (ref 0–0)
PLATELET # BLD AUTO: 92 K/UL — LOW (ref 130–400)
PMV BLD: 10.3 FL — SIGNIFICANT CHANGE UP (ref 7.4–10.4)
POTASSIUM SERPL-MCNC: 3.3 MMOL/L — LOW (ref 3.5–5)
POTASSIUM SERPL-SCNC: 3.3 MMOL/L — LOW (ref 3.5–5)
PROT SERPL-MCNC: 6.3 G/DL — SIGNIFICANT CHANGE UP (ref 6–8)
PROT SERPL-MCNC: 6.4 G/DL — SIGNIFICANT CHANGE UP (ref 6–8)
RBC # BLD: 4.85 M/UL — SIGNIFICANT CHANGE UP (ref 4.7–6.1)
RBC # FLD: 11.8 % — SIGNIFICANT CHANGE UP (ref 11.5–14.5)
SODIUM SERPL-SCNC: 139 MMOL/L — SIGNIFICANT CHANGE UP (ref 135–146)
TRIGL SERPL-MCNC: 93 MG/DL — SIGNIFICANT CHANGE UP
URATE SERPL-MCNC: 4.9 MG/DL — SIGNIFICANT CHANGE UP (ref 3.4–8.8)
WBC # BLD: 10.18 K/UL — SIGNIFICANT CHANGE UP (ref 4.8–10.8)
WBC # FLD AUTO: 10.18 K/UL — SIGNIFICANT CHANGE UP (ref 4.8–10.8)

## 2024-02-02 PROCEDURE — 99232 SBSQ HOSP IP/OBS MODERATE 35: CPT

## 2024-02-02 RX ORDER — POTASSIUM CHLORIDE 20 MEQ
40 PACKET (EA) ORAL ONCE
Refills: 0 | Status: COMPLETED | OUTPATIENT
Start: 2024-02-02 | End: 2024-02-02

## 2024-02-02 RX ORDER — AMLODIPINE BESYLATE 2.5 MG/1
5 TABLET ORAL DAILY
Refills: 0 | Status: DISCONTINUED | OUTPATIENT
Start: 2024-02-02 | End: 2024-02-08

## 2024-02-02 RX ORDER — REMDESIVIR 5 MG/ML
INJECTION INTRAVENOUS
Refills: 0 | Status: DISCONTINUED | OUTPATIENT
Start: 2024-02-02 | End: 2024-02-02

## 2024-02-02 RX ORDER — CHLORHEXIDINE GLUCONATE 213 G/1000ML
1 SOLUTION TOPICAL DAILY
Refills: 0 | Status: DISCONTINUED | OUTPATIENT
Start: 2024-02-02 | End: 2024-02-09

## 2024-02-02 RX ORDER — ACETAMINOPHEN 500 MG
650 TABLET ORAL EVERY 6 HOURS
Refills: 0 | Status: DISCONTINUED | OUTPATIENT
Start: 2024-02-02 | End: 2024-02-12

## 2024-02-02 RX ORDER — DEXAMETHASONE 0.5 MG/5ML
6 ELIXIR ORAL DAILY
Refills: 0 | Status: DISCONTINUED | OUTPATIENT
Start: 2024-02-02 | End: 2024-02-07

## 2024-02-02 RX ORDER — SODIUM CHLORIDE 9 MG/ML
1000 INJECTION, SOLUTION INTRAVENOUS
Refills: 0 | Status: DISCONTINUED | OUTPATIENT
Start: 2024-02-02 | End: 2024-02-07

## 2024-02-02 RX ORDER — REMDESIVIR 5 MG/ML
100 INJECTION INTRAVENOUS EVERY 24 HOURS
Refills: 0 | Status: COMPLETED | OUTPATIENT
Start: 2024-02-03 | End: 2024-02-06

## 2024-02-02 RX ORDER — REMDESIVIR 5 MG/ML
INJECTION INTRAVENOUS
Refills: 0 | Status: COMPLETED | OUTPATIENT
Start: 2024-02-02 | End: 2024-02-06

## 2024-02-02 RX ORDER — REMDESIVIR 5 MG/ML
200 INJECTION INTRAVENOUS EVERY 24 HOURS
Refills: 0 | Status: COMPLETED | OUTPATIENT
Start: 2024-02-02 | End: 2024-02-02

## 2024-02-02 RX ADMIN — Medication 650 MILLIGRAM(S): at 17:08

## 2024-02-02 RX ADMIN — ENTACAPONE 200 MILLIGRAM(S): 200 TABLET, FILM COATED ORAL at 22:31

## 2024-02-02 RX ADMIN — RIVASTIGMINE 1 PATCH: 4.6 PATCH, EXTENDED RELEASE TRANSDERMAL at 11:41

## 2024-02-02 RX ADMIN — CHLORHEXIDINE GLUCONATE 1 APPLICATION(S): 213 SOLUTION TOPICAL at 11:46

## 2024-02-02 RX ADMIN — LEVETIRACETAM 500 MILLIGRAM(S): 250 TABLET, FILM COATED ORAL at 17:08

## 2024-02-02 RX ADMIN — METHOCARBAMOL 750 MILLIGRAM(S): 500 TABLET, FILM COATED ORAL at 22:31

## 2024-02-02 RX ADMIN — Medication 650 MILLIGRAM(S): at 06:12

## 2024-02-02 RX ADMIN — Medication 650 MILLIGRAM(S): at 11:41

## 2024-02-02 RX ADMIN — Medication 200 MILLIGRAM(S): at 11:41

## 2024-02-02 RX ADMIN — Medication 40 MILLIEQUIVALENT(S): at 11:48

## 2024-02-02 RX ADMIN — RIVASTIGMINE 1 PATCH: 4.6 PATCH, EXTENDED RELEASE TRANSDERMAL at 13:22

## 2024-02-02 RX ADMIN — RIVASTIGMINE 1 PATCH: 4.6 PATCH, EXTENDED RELEASE TRANSDERMAL at 17:09

## 2024-02-02 RX ADMIN — ENTACAPONE 200 MILLIGRAM(S): 200 TABLET, FILM COATED ORAL at 05:22

## 2024-02-02 RX ADMIN — LEVETIRACETAM 500 MILLIGRAM(S): 250 TABLET, FILM COATED ORAL at 05:23

## 2024-02-02 RX ADMIN — METHOCARBAMOL 750 MILLIGRAM(S): 500 TABLET, FILM COATED ORAL at 17:08

## 2024-02-02 RX ADMIN — Medication 650 MILLIGRAM(S): at 17:07

## 2024-02-02 RX ADMIN — Medication 12.5 MILLIGRAM(S): at 05:22

## 2024-02-02 RX ADMIN — CARBIDOPA AND LEVODOPA 2 TABLET(S): 25; 100 TABLET ORAL at 05:25

## 2024-02-02 RX ADMIN — Medication 650 MILLIGRAM(S): at 23:10

## 2024-02-02 RX ADMIN — Medication 81 MILLIGRAM(S): at 11:40

## 2024-02-02 RX ADMIN — METHOCARBAMOL 750 MILLIGRAM(S): 500 TABLET, FILM COATED ORAL at 05:27

## 2024-02-02 RX ADMIN — AMLODIPINE BESYLATE 5 MILLIGRAM(S): 2.5 TABLET ORAL at 11:48

## 2024-02-02 RX ADMIN — Medication 12.5 MILLIGRAM(S): at 17:07

## 2024-02-02 RX ADMIN — CARBIDOPA AND LEVODOPA 1 TABLET(S): 25; 100 TABLET ORAL at 22:31

## 2024-02-02 RX ADMIN — REMDESIVIR 200 MILLIGRAM(S): 5 INJECTION INTRAVENOUS at 16:58

## 2024-02-02 RX ADMIN — Medication 650 MILLIGRAM(S): at 13:22

## 2024-02-02 RX ADMIN — CARBIDOPA AND LEVODOPA 2 TABLET(S): 25; 100 TABLET ORAL at 11:40

## 2024-02-02 RX ADMIN — ENOXAPARIN SODIUM 40 MILLIGRAM(S): 100 INJECTION SUBCUTANEOUS at 05:26

## 2024-02-02 RX ADMIN — RIVASTIGMINE 1 PATCH: 4.6 PATCH, EXTENDED RELEASE TRANSDERMAL at 11:48

## 2024-02-02 RX ADMIN — LIDOCAINE 1 PATCH: 4 CREAM TOPICAL at 11:41

## 2024-02-02 RX ADMIN — METHOCARBAMOL 750 MILLIGRAM(S): 500 TABLET, FILM COATED ORAL at 11:41

## 2024-02-02 RX ADMIN — LIDOCAINE 1 PATCH: 4 CREAM TOPICAL at 17:08

## 2024-02-02 RX ADMIN — ENTACAPONE 200 MILLIGRAM(S): 200 TABLET, FILM COATED ORAL at 11:41

## 2024-02-02 RX ADMIN — METHOCARBAMOL 750 MILLIGRAM(S): 500 TABLET, FILM COATED ORAL at 00:02

## 2024-02-02 RX ADMIN — Medication 650 MILLIGRAM(S): at 06:42

## 2024-02-02 RX ADMIN — CARBIDOPA AND LEVODOPA 2 TABLET(S): 25; 100 TABLET ORAL at 17:07

## 2024-02-02 RX ADMIN — SODIUM CHLORIDE 100 MILLILITER(S): 9 INJECTION, SOLUTION INTRAVENOUS at 17:48

## 2024-02-02 RX ADMIN — CARVEDILOL PHOSPHATE 12.5 MILLIGRAM(S): 80 CAPSULE, EXTENDED RELEASE ORAL at 05:22

## 2024-02-02 RX ADMIN — Medication 650 MILLIGRAM(S): at 22:31

## 2024-02-02 NOTE — PROVIDER CONTACT NOTE (OTHER) - SITUATION
Attempts to collect UA C&S unsuccessful as pt could not provide urine. Pt attempted to urinate on own but stated he could not.

## 2024-02-02 NOTE — PROVIDER CONTACT NOTE (OTHER) - ASSESSMENT
Attempts to collect UA C&S unsuccessful as pt could not provide urine. Pt attempted to urinate on own but stated he could not. Bladder palpated but not full and pt denies any discomfort. Bladder scan revealed 18ML. Pt states he has not been drinking today. Full water pitcher provided, educated and encouraged to drink. Pt verbalized understanding and in agreement.

## 2024-02-02 NOTE — PROGRESS NOTE ADULT - ASSESSMENT
Patient is a 72 y/o man with PMHx of Parkinson's disease (diagnosed 7 years ago, HTN, 2 x CVA (1998, 2016), DM, Seizure (2019) on keppra presenting to the ED after 2 x falls. 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 He has a brilliant mind. He has never smoked, drank or used and drugs in his entire life. He has lead a truly remarkable life. Pt was on tour in [place] and fell on stage at end of show and needed asst getting up. He then flew to [place], but could not get out of plane. Pt was hospitalized and this was the begin of his Parkinson's.    # COVID infection; thrombocytopenia  quarantine 1/31-2/9  now symp w/ fever and RA down to 88% at times; not on O2 yet  I do NOT believe this is cause of leg weakness; but certainly is cause of gen fatigue and tiredness  CXR unremarkable  on RA  ID consult noted  plt are usually nl (as of 6/2023) -> plt should get better on their own; ok to use DVT ppx  RDV x5 days (2/2-2/6)  Dexa 6mg po q24 x10 days (2/2-2/11)    # Fall 2/2 suspected akinesia from Parkinson's disease; Hx of Seizure (2019); hx vertigo  orthostatic hypotension neg  no syncope; no vertigo   seen by ENT 06/2023: MR head negative, orthostatic vital signs negative (06/2023)  Trauma w/u negative for acute traumatic injury.   neuro feels he is having morning freeze episodes 2/2 wearing off of sinemet because of lack of long-acting version at bedtime  make sinemet 25/100 2 tab at 7am, 12 pm and 5pm  make sinemet ER 50/200 1 tab HS (10pm) - this is helping  c/w entacapone, rivastigmine    PT eval - walked 40' w/ asst; pt walked slowly and gets tired easily  neuro requesting pt go for rehab at [facility]: [phone] (I agree)  fall precautions  c/w meclizine 12.5 q12    # Hx of CVA (1996, 2016)   CT H noted: stable mod chr microvasc changes and chr lacunar infarcts  c/w asa 81 q24  not on statin - LDL 80    # hx Sz  on keppra 500mg BID    # lumbar back pain after fall  no trauma seen on CT A/P  no further imaging needed for now  no further w/u  PT eval  lido patch top q24  tyl 975mg po q8 standing  robaxin 750mg po q6  tramadol 50mg po q8 prn (do not raise dose)    # HTN  c/w carvedilol 12.5 q12  add norvasc 5mg po q24    # hx gout - pt says this has not been too bad recently  restart allopurinol 200mg po q24  uric acid 4.9    # Insomnia   c/w Lunesta (use ambien here)    # Apical lung mass- incidental finding  CT chest (01/31/2024): 1.8 cm left apical lung mass, suspicious for malignancy-new from neck CT of 6/1/2023 were the lung apices were imaged. Indeterminate right pulmonary nodular opacities.   Pulm consult: noted  agree, no reason to bx inpt, vita w/ COVID +; do outpt PET scan and plan for outpt bx after COVID past 2 wks.    # Colonic lesion vs adherent stool  there is no rush to eval lesions that could simply be stool  outpt GI consult for colonoscopy once pt is well and past COVID quarantine    # hx of obesity; s/p lap band in past, which became infected at one point and needed surgery to fix it    # asymp GB stones  no further intervention    # rt renal calc and 8mm bladder calc; rt renal cyst; hx urine incontinence   outpt uro eval    # GI prophylaxis: None    # DVT prophylaxis: lovenox 40 q24    # Activity: need PT for back pain; WBAT;     # Code Status: Full code    # Shanon ELLSWORTH ([phone]):     Dispo: PT prn;  tx back pain; tx COVID; quarantine to 2/9;   eventually, pt will need STR - we prefer [facility] - f/u CM - anticipate d/c Mon or Tues?  outpt pulm f/u and PET scan; outpt uro f/u re stones; outpt GI eval for c-scope; outpt neuro f/u re PD; outpt IM f/u

## 2024-02-02 NOTE — PROGRESS NOTE ADULT - ASSESSMENT
ASSESSMENT  74 y/o male with PMH of Parkinson's disease (diagnosed  7 years ago, HTN, 2 x CVA (1998, 2016), DM, Seizure (2019) on keppra presenting to the ED after 2 x falls.      IMPRESSION  #COVID19, now symptomatic with hypoxemia, severe  CXR no PNA  CT possible lung mass  #1.8 cm left apical lung mass, suspicious for malignancy-new from neck CT   of 6/1/2023 were the lung apices were imaged. Recommend oncologic   evaluation.  #Sepsis ruled out on admission   #Abx allergy: penicillin (Unknown)  cefaclor (Unknown)  sulfa  Creatinine: 1.0 (02-01-24 @ 06:23)    Height (cm): 172.7 (01-31-24 @ 21:06)  Weight (kg): 74.7 (01-31-24 @ 21:06)    RECOMMENDATIONS  - RDV x 5 days  - Dex 6mg PO daily x 10 days  - a/c per primary team     If any questions, please send a message or call on [employer] Teams  Please continue to update ID with any pertinent new laboratory or radiographic findings

## 2024-02-02 NOTE — PHYSICAL THERAPY INITIAL EVALUATION ADULT - GENERAL OBSERVATIONS, REHAB EVAL
10:00-10:30am Pt encountered in bed in NAD. Patient performed bed mobility, transfers, and ambulated with assistance, limited by impaired balance. Pt would benefit from continued PT to restore prior level of mobility and safety.

## 2024-02-02 NOTE — PROGRESS NOTE ADULT - SUBJECTIVE AND OBJECTIVE BOX
GENE ZAKIA  73y, Male  Allergy: sulfa drugs (Unknown)  penicillin (Unknown)  cefaclor (Unknown)      LOS  2d    CHIEF COMPLAINT: Fall (02 Feb 2024 10:19)      INTERVAL EVENTS/HPI  - febrile, desat to high 80s on RA  - T(F): , Max: 102.1 (02-02-24 @ 05:05)  - Denies any worsening symptoms  - Tolerating medication  - WBC Count: 10.18 (02-02-24 @ 06:20)  WBC Count: 9.44 (02-01-24 @ 06:23)     - Creatinine: 0.9 (02-02-24 @ 06:20)  Creatinine: 1.0 (02-01-24 @ 06:23)       ROS  General: Denies rigors, nightsweats  HEENT: Denies headache, rhinorrhea, sore throat, eye pain  CV: Denies CP, palpitations  PULM: Denies wheezing, hemoptysis  GI: Denies hematemesis, hematochezia, melena  : Denies discharge, hematuria  MSK: Denies arthralgias, myalgias  SKIN: Denies rash, lesions  NEURO: Denies paresthesias, weakness  PSYCH: Denies depression, anxiety    VITALS:  T(F): 96.2, Max: 102.1 (02-02-24 @ 05:05)  HR: 72  BP: 100/61  RR: 18Vital Signs Last 24 Hrs  T(C): 35.7 (02 Feb 2024 12:43), Max: 38.9 (02 Feb 2024 05:05)  T(F): 96.2 (02 Feb 2024 12:43), Max: 102.1 (02 Feb 2024 05:05)  HR: 72 (02 Feb 2024 12:43) (72 - 87)  BP: 100/61 (02 Feb 2024 12:43) (100/61 - 192/96)  BP(mean): --  RR: 18 (02 Feb 2024 12:43) (18 - 18)  SpO2: 93% (02 Feb 2024 07:30) (88% - 93%)    Parameters below as of 02 Feb 2024 07:30  Patient On (Oxygen Delivery Method): room air        PHYSICAL EXAM:  Gen: NAD, resting in bed  HEENT: Normocephalic, atraumatic  Neck: supple, no lymphadenopathy  CV: Regular rate & regular rhythm  Lungs: decreased BS at bases, no fremitus  Abdomen: Soft, BS present  Ext: Warm, well perfused  Neuro: non focal, awake  Skin: no rash, no erythema  Lines: no phlebitis    FH: Non-contributory  Social Hx: Non-contributory    TESTS & MEASUREMENTS:                        16.5   10.18 )-----------( 92       ( 02 Feb 2024 06:20 )             46.1     02-02    139  |  103  |  12  ----------------------------<  87  3.3<L>   |  22  |  0.9    Ca    8.3<L>      02 Feb 2024 06:20  Mg     1.9     02-02    TPro  6.4  /  Alb  3.6  /  TBili  1.1  /  DBili  x   /  AST  26  /  ALT  <5  /  AlkPhos  76  02-02      LIVER FUNCTIONS - ( 02 Feb 2024 06:20 )  Alb: 3.6 g/dL / Pro: 6.4 g/dL / ALK PHOS: 76 U/L / ALT: <5 U/L / AST: 26 U/L / GGT: x           Urinalysis Basic - ( 02 Feb 2024 06:20 )    Color: x / Appearance: x / SG: x / pH: x  Gluc: 87 mg/dL / Ketone: x  / Bili: x / Urobili: x   Blood: x / Protein: x / Nitrite: x   Leuk Esterase: x / RBC: x / WBC x   Sq Epi: x / Non Sq Epi: x / Bacteria: x          Lactate, Blood: 1.5 mmol/L (01-31-24 @ 13:17)      INFECTIOUS DISEASES TESTING  Rapid RVP Result: NotDetec (06-02-23 @ 14:10)      INFLAMMATORY MARKERS      RADIOLOGY & ADDITIONAL TESTS:  I have personally reviewed the last available Chest xray  CXR  Xray Chest 1 View AP/PA:   ACC: [id number] EXAM:  XR CHEST 1 VIEW   ORDERED BY: REA OSORIO     PROCEDURE DATE:  01/31/2024          INTERPRETATION:  Clinical History / Reason for exam: Trauma    Comparison : Chest radiograph 6/2/2023.    Technique/Positioning: Frontal view    Findings:    Support devices: None.    Cardiac/mediastinum/hilum: Unremarkable.    Lung parenchyma/Pleura: Within normal limits.    Skeleton/soft tissues: There are degenerative changes of both shoulders   and thoracic spine.    Impression:    No radiographic evidence of acute cardiopulmonary disease.        --- End of Report ---            MARGARITA ADORNO MD; Attending Radiologist  This document has been electronically signed. Jan 31 2024  1:14PM (01-31-24 @ 13:05)      CT  CT Chest w/ IV Cont:   ACC: [id number] EXAM:  CT CHEST IC   ORDERED BY: REA OSORIO     ACC: [id number] EXAM:  CT ABDOMEN AND PELVIS IC   ORDERED BY: REA OSORIO     PROCEDURE DATE:  01/31/2024          INTERPRETATION:  CLINICAL STATEMENT: Trauma    TECHNIQUE: Contiguous axial CT images were obtained from the thoracic   inlet to the pubic symphysis following administration of intravenous   contrast. 100 cc Omnipaque 350 administered.  Oral contrast was not   administered.  Reformatted images in the coronal and sagittal planes, as   well as MIP reconstructed images, were acquired.    COMPARISON CT: None.        FINDINGS:    CHEST:    LUNGS/PLEURA/AIRWAYS: Limited evaluation due to motion. 1.8 cm left   apical mass (6/46), new from neck CT of 6/1/2023, where the lung apices   were imaged. Additional indeterminate right-sided nodular opacities   measuring up to 6 mm , for example (6/98). Lateral atelectatic changes.   No pleural effusion or pneumothorax.    MEDIASTINUM/THORACIC NODES: No lymphadenopathy..    HEART/GREAT VESSELS: Coronary and aortic calcifications. No pericardial   effusion.. Ectatic ascending thoracic aorta measuring up to 4.2 cm.      ABDOMEN/PELVIS:    HEPATOBILIARY: Cholelithiasis.    SPLEEN: Unremarkable.    PANCREAS: Unremarkable.    ADRENAL GLANDS: Bilateral adrenal nodular thickening..    KIDNEYS: Right renal cyst and hypodensities too small to characterize.   Nonobstructing right renal calculi. No hydronephrosis..    ABDOMINOPELVIC NODES: Unremarkable.    PELVIC ORGANS: 8 mm bladder calculus.    PERITONEUM/MESENTERY/BOWEL: Colonic diverticulosis. No evidence of bowel   obstruction ascites or free air.. 2 rounded foci in the transverse colon   (4/59-60). Differential includes adherent stool versus colonic lesions.    BONES/SOFT TISSUES: Degenerative changes of the spine.    OTHER: Atherosclerotic calcifications.      IMPRESSION:    1.8 cm left apical lung mass, suspicious for malignancy-new from neck CT   of 6/1/2023 were the lung apices were imaged. Recommend oncologic   evaluation.    Indeterminate right pulmonary nodular opacities. Recommend follow-up.    2 rounded foci in the transverse colon. Differential includes adherent   stool versus colonic lesions. Recommend follow-up with colonoscopy.    No CT evidence of acutetraumatic injury in the chest abdomen or pelvis.              Other findings in the body of the report.    --- End of Report ---            JUANITA CORADO MD; Attending Radiologist  This document has been electronically signed. Jan 31 2024  3:33PM (01-31-24 @ 14:11)      CARDIOLOGY TESTING      MEDICATIONS  acetaminophen     Tablet .. 650 Oral every 6 hours  allopurinol 200 Oral daily  amLODIPine   Tablet 5 Oral daily  aspirin  chewable 81 Oral daily  carbidopa/levodopa  25/100 2 Oral <User Schedule>  carbidopa/levodopa CR 50/200 1 Oral <User Schedule>  carvedilol 12.5 Oral every 12 hours  chlorhexidine 2% Cloths 1 Topical daily  enoxaparin Injectable 40 SubCutaneous every 24 hours  entacapone 200 Oral three times a day  levETIRAcetam 500 Oral two times a day  lidocaine   4% Patch 1 Transdermal daily  meclizine 12.5 Oral two times a day  methocarbamol 750 Oral every 6 hours  remdesivir  IVPB 200 IV Intermittent every 24 hours  remdesivir  IVPB  IV Intermittent   rivastigmine patch  9.5 mG/24 Hr(s) 1 Transdermal every 24 hours      WEIGHT  Weight (kg): 74.7 (01-31-24 @ 21:06)  Creatinine: 0.9 mg/dL (02-02-24 @ 06:20)      ANTIBIOTICS:  remdesivir  IVPB 200 milliGRAM(s) IV Intermittent every 24 hours  remdesivir  IVPB   IV Intermittent       All available historical records have been reviewed

## 2024-02-02 NOTE — PROGRESS NOTE ADULT - ASSESSMENT
Patient is a 74 y/o male with PMH of Parkinson's disease (diagnosed  7 years ago, HTN, 2 x CVA (1998, 2016), DM, Seizure (2019) on keppra presenting to the ED after 2 x falls. (of note, patient was admitted for same complaints in 09/22/2022)      #Fall 2/2 suspected akinesia vs orthostatic hypotension vs syncope vs vertigo   #Parkinson's disease  #Hx of Seizure (2019)  #Hx of persistent Vertigo  - seen by ENT 06/2023  - on keppra 500mg BID, MR head negative, orthostatic vital signs negative (06/2023)  - Trauma w/u negative for acute traumatic injury.   - on sinemet, entacapone, rivastigmine    - PT eval -> pending  - Neuro consult recs appreciated   - isolation/fall precautions  - c/w meclizine    #COVID infection  - CXR unremarkable, on RA, asymptomatic.    # HTN  - BP 160s/50s  - c/w carvedilol     #Apical lung mass- incidental finding  - CT chest (01/31/2024): 1.8 cm left apical lung mass, suspicious for malignancy-new from neck CT of 6/1/2023 were the lung apices were imaged. Indeterminate right pulmonary nodular opacities.   - Patient request medical w/u  - Pulm consult - > pending    #Colonic lesion vs adherent stool  - consider GI consult for possible inpatient colonoscopy.    # Hx of CVA (1996, 2016)   - no residual deficits     # Insomnia   - c/w trazodone   - c/w Lunesta    GI prophylaxis: None  DVT prophylaxis: lovenox  Diet: Regular  Code Status: Full code    #Pending: Pulm and PT eval, and finalized neuro recs   Patient is a 72 y/o male with PMH of Parkinson's disease (diagnosed  7 years ago, HTN, 2 x CVA (1998, 2016), DM, Seizure (2019) on keppra presenting to the ED after 2 x falls. (of note, patient was admitted for same complaints in 09/22/2022)      #Fall 2/2 suspected akinesia vs orthostatic hypotension vs syncope vs vertigo   #Parkinson's disease  #Hx of Seizure (2019)  #Hx of persistent Vertigo  - seen by ENT 06/2023  - on keppra 500mg BID, MR head negative, orthostatic vital signs negative (06/2023)  - Trauma w/u negative for acute traumatic injury.   - on sinemet, entacapone, rivastigmine    - PT eval -> pending  - Neuro consult recs appreciated   - isolation/fall precautions  - c/w meclizine    #COVID infection  - CXR unremarkable, on RA,   - Febrile and slightlty hypoxic overnight 2/1  - Started on remdesivir and dexa as per ID    # HTN  - BP 160s/50s  - c/w carvedilol   - Norvasc as added 2/2    #Apical lung mass- incidental finding  - CT chest (01/31/2024): 1.8 cm left apical lung mass, suspicious for malignancy-new from neck CT of 6/1/2023 were the lung apices were imaged. Indeterminate right pulmonary nodular opacities.   - Patient request medical w/u  - Pulm consult - > pending    #Colonic lesion vs adherent stool  - consider GI consult for possible inpatient colonoscopy.    # Hx of CVA (1996, 2016)   - no residual deficits     # Insomnia   - c/w trazodone   - c/w Lunesta    GI prophylaxis: None  DVT prophylaxis: lovenox  Diet: Regular  Code Status: Full code    #Pending:  F/u Blood cx, UA w/ Urine cx, procal, and monitor fevers

## 2024-02-02 NOTE — PROVIDER CONTACT NOTE (OTHER) - RECOMMENDATIONS
MD to heather, IV hydration MD mathew romero, IV hydration  f/u 1900 still has not urinated after 1.5 hr of fluids, md to be aware, no new orders at this time.

## 2024-02-02 NOTE — PHYSICAL THERAPY INITIAL EVALUATION ADULT - PERTINENT HX OF CURRENT PROBLEM, REHAB EVAL
Patient is a 72 y/o male with PMH of Parkinson's disease (diagnosed  7 years ago, HTN, 2 x CVA (1998, 2016), DM, Seizure (2019) on keppra presenting to the ED after 2 x falls. (of note, patient was admitted for same complaints in 09/22/2022)    Patient states that he woke up around 0500 went to the bathroom and fell in the kitchen. He was unable to recall if he lost consciousness but was able to get back up and go back to the bedroom. When getting back up from bed, he fell again and was not able to get up without assistance. He cannot recall if he felt dizzy or lost consciousness He states that he was unable to move his legs. He endorses some mid-back tenderness attributed to the fall and tremor of his left hand attributed to parkinson's. He also endorses constant mild vertigo has improved since last admission in 06/2023.

## 2024-02-02 NOTE — PROGRESS NOTE ADULT - SUBJECTIVE AND OBJECTIVE BOX
ZAKIA MILLS  73y  Male  ***My note supersedes ALL resident notes that I sign.  My corrections for their notes are in my note.***    I can be reached directly on [employer] [id number]. My office number is [phone]. My personal cell number is [phone].    INTERVAL EVENTS: Here for f/u of COVID. Pt having high fever. Pt feels tired and wiped out. MS is clear. Speaks well. Pt feels long-acting sinemet at night is helping morning mvmt ability already (prob would feel even better if it were not for COVID). Pt not needing O2. Back pain is much better.    T(F): 96.2 (02-02-24 @ 12:43), Max: 102.1 (02-02-24 @ 05:05)  HR: 72 (02-02-24 @ 12:43) (72 - 87)  BP: 100/61 (02-02-24 @ 12:43) (100/61 - 162/95)  RR: 18 (02-02-24 @ 12:43) (18 - 18)  SpO2: 93% (02-02-24 @ 07:30) (88% - 93%)    Gen: NAD; somewhat flat affect; tired  HEENT: PERRL, EOMI, mouth clr, nose clr  Neck: no nodes, no JVD, thyroid nl  lungs: decr BS; no wheeze; no crackles  hrt: s1 s2 rrr no murmur  abd: soft, NT/ND, no HS megaly  back: mild tender lumbar area  ext: no edema, no c/c  neuro: aa ox3, cn intact, can move all 4 ext; b/l mild hand tremor L>R    LABS:                      16.5    (    95.1   10.18 )-----------( ---------      92       ( 02 Feb 2024 06:20 )             46.1    (    11.8     Platelet Count - Automated: 92 K/uL (02-02-24 @ 06:20)  Platelet Count - Automated: 104 K/uL (02-01-24 @ 06:23)  Platelet Count - Automated: 109 K/uL (01-31-24 @ 13:17)    --   (   --   (   --      02-02-24 @ 15:48  ----------------------               --   (   --   (   --                             -----                        1.0  Ca  --   Mg  --    P   --     139   (   103   (   87      02-02-24 @ 06:20  ----------------------               3.3   (   22   (   12                             -----                        0.9  Ca  8.3   Mg  1.9    P   --     LFT  6.3  (  1.1  (  28       02-02-24 @ 15:48  -------------------------  3.5  (  75  (  8    Urinalysis Basic - ( 02 Feb 2024 06:20 )    Color: x / Appearance: x / SG: x / pH: x  Gluc: 87 mg/dL / Ketone: x  / Bili: x / Urobili: x   Blood: x / Protein: x / Nitrite: x   Leuk Esterase: x / RBC: x / WBC x   Sq Epi: x / Non Sq Epi: x / Bacteria: x    CAPILLARY BLOOD GLUCOSE  POCT Blood Glucose.: 91 (02-01-24 @ 07:51)  POCT Blood Glucose.: 116 (01-31-24 @ 21:36)    RADIOLOGY & ADDITIONAL TESTS:      MEDICATIONS:  remdesivir  IVPB   IV Intermittent     acetaminophen     Tablet .. 650 milliGRAM(s) Oral every 6 hours  allopurinol 200 milliGRAM(s) Oral daily  amLODIPine   Tablet 5 milliGRAM(s) Oral daily  aspirin  chewable 81 milliGRAM(s) Oral daily  carbidopa/levodopa  25/100 2 Tablet(s) Oral <User Schedule>  carbidopa/levodopa CR 50/200 1 Tablet(s) Oral <User Schedule>  carvedilol 12.5 milliGRAM(s) Oral every 12 hours  chlorhexidine 2% Cloths 1 Application(s) Topical daily  dexAMETHasone     Tablet 6 milliGRAM(s) Oral daily  enoxaparin Injectable 40 milliGRAM(s) SubCutaneous every 24 hours  entacapone 200 milliGRAM(s) Oral three times a day  lactated ringers. 1000 milliLiter(s) IV Continuous <Continuous>  levETIRAcetam 500 milliGRAM(s) Oral two times a day  lidocaine   4% Patch 1 Patch Transdermal daily  meclizine 12.5 milliGRAM(s) Oral two times a day  methocarbamol 750 milliGRAM(s) Oral every 6 hours  rivastigmine patch  9.5 mG/24 Hr(s) 1 Patch Transdermal every 24 hours  traMADol 50 milliGRAM(s) Oral every 8 hours PRN  zolpidem 5 milliGRAM(s) Oral at bedtime PRN

## 2024-02-02 NOTE — PROGRESS NOTE ADULT - SUBJECTIVE AND OBJECTIVE BOX
24H events:    Patient is a 73y old Male who presents with a chief complaint of Fall (01 Feb 2024 14:34)    Primary diagnosis of Fall    Today is hospital day 2d. This morning patient was seen and examined at bedside, resting comfortably in bed.    No acute or major events overnight.    Code Status:    Family communication:  Contact date:  Name of person contacted:  Relationship to patient:  Communication details:  What matters most:    PAST MEDICAL & SURGICAL HISTORY  Parkinson disease    CVA (cerebral vascular accident)    HTN (hypertension)    Diabetes mellitus    Gout    History of appendectomy      SOCIAL HISTORY:  Social History:  Denies EtOH, tobacco, illicit drug use. (31 Jan 2024 20:52)      ALLERGIES:  sulfa drugs (Unknown)  penicillin (Unknown)  cefaclor (Unknown)    MEDICATIONS:  STANDING MEDICATIONS  acetaminophen     Tablet .. 650 milliGRAM(s) Oral every 6 hours  allopurinol 200 milliGRAM(s) Oral daily  amLODIPine   Tablet 5 milliGRAM(s) Oral daily  aspirin  chewable 81 milliGRAM(s) Oral daily  carbidopa/levodopa  25/100 2 Tablet(s) Oral <User Schedule>  carbidopa/levodopa CR 50/200 1 Tablet(s) Oral <User Schedule>  carvedilol 12.5 milliGRAM(s) Oral every 12 hours  chlorhexidine 2% Cloths 1 Application(s) Topical daily  enoxaparin Injectable 40 milliGRAM(s) SubCutaneous every 24 hours  entacapone 200 milliGRAM(s) Oral three times a day  levETIRAcetam 500 milliGRAM(s) Oral two times a day  lidocaine   4% Patch 1 Patch Transdermal daily  meclizine 12.5 milliGRAM(s) Oral two times a day  methocarbamol 750 milliGRAM(s) Oral every 6 hours  potassium chloride   Powder 40 milliEquivalent(s) Oral once  rivastigmine patch  9.5 mG/24 Hr(s) 1 Patch Transdermal every 24 hours    PRN MEDICATIONS  traMADol 50 milliGRAM(s) Oral every 8 hours PRN  zolpidem 5 milliGRAM(s) Oral at bedtime PRN    VITALS:   T(F): 102.1  HR: 87  BP: 162/95  RR: 18  SpO2: 93%    PHYSICAL EXAM:  CONSTITUTIONAL: Appears uncomfortable from back pain  SKIN: Skin exam is warm and dry  HEAD: Normocephalic; atraumatic.  EYES: Pupils equal round reactive to light; conjunctiva and sclera clear.  NECK: Supple; non tender  CARD: Regular rate and rhythm. Normal S1, S2  RESP: Lungs clear to auscultation bilaterally. No wheezes  ABD: Abdomen soft; non-tender; non-distended  EXT: No clubbing, cyanosis or edema. R leg stiffness  NEURO: Alert and oriented x 3. No focal deficits.  PSYCH: Cooperative, appropriate.    LABS:                        16.5   10.18 )-----------( 92       ( 02 Feb 2024 06:20 )             46.1     02-02    139  |  103  |  12  ----------------------------<  87  3.3<L>   |  22  |  0.9    Ca    8.3<L>      02 Feb 2024 06:20  Mg     1.9     02-02    TPro  6.4  /  Alb  3.6  /  TBili  1.1  /  DBili  x   /  AST  26  /  ALT  <5  /  AlkPhos  76  02-02    PT/INR - ( 31 Jan 2024 13:17 )   PT: 12.60 sec;   INR: 1.10 ratio         PTT - ( 31 Jan 2024 13:17 )  PTT:28.6 sec  Urinalysis Basic - ( 02 Feb 2024 06:20 )    Color: x / Appearance: x / SG: x / pH: x  Gluc: 87 mg/dL / Ketone: x  / Bili: x / Urobili: x   Blood: x / Protein: x / Nitrite: x   Leuk Esterase: x / RBC: x / WBC x   Sq Epi: x / Non Sq Epi: x / Bacteria: x                RADIOLOGY:           24H events:    Patient is a 73y old Male who presents with a chief complaint of Fall (01 Feb 2024 14:34)    Primary diagnosis of Fall    Today is hospital day 2d. This morning patient was seen and examined at bedside, resting comfortably in bed.    Overnight patient was found to febrile and satting on the lower end.  Code Status:    Family communication:  Contact date:  Name of person contacted:  Relationship to patient:  Communication details:  What matters most:    PAST MEDICAL & SURGICAL HISTORY  Parkinson disease    CVA (cerebral vascular accident)    HTN (hypertension)    Diabetes mellitus    Gout    History of appendectomy      SOCIAL HISTORY:  Social History:  Denies EtOH, tobacco, illicit drug use. (31 Jan 2024 20:52)      ALLERGIES:  sulfa drugs (Unknown)  penicillin (Unknown)  cefaclor (Unknown)    MEDICATIONS:  STANDING MEDICATIONS  acetaminophen     Tablet .. 650 milliGRAM(s) Oral every 6 hours  allopurinol 200 milliGRAM(s) Oral daily  amLODIPine   Tablet 5 milliGRAM(s) Oral daily  aspirin  chewable 81 milliGRAM(s) Oral daily  carbidopa/levodopa  25/100 2 Tablet(s) Oral <User Schedule>  carbidopa/levodopa CR 50/200 1 Tablet(s) Oral <User Schedule>  carvedilol 12.5 milliGRAM(s) Oral every 12 hours  chlorhexidine 2% Cloths 1 Application(s) Topical daily  enoxaparin Injectable 40 milliGRAM(s) SubCutaneous every 24 hours  entacapone 200 milliGRAM(s) Oral three times a day  levETIRAcetam 500 milliGRAM(s) Oral two times a day  lidocaine   4% Patch 1 Patch Transdermal daily  meclizine 12.5 milliGRAM(s) Oral two times a day  methocarbamol 750 milliGRAM(s) Oral every 6 hours  potassium chloride   Powder 40 milliEquivalent(s) Oral once  rivastigmine patch  9.5 mG/24 Hr(s) 1 Patch Transdermal every 24 hours    PRN MEDICATIONS  traMADol 50 milliGRAM(s) Oral every 8 hours PRN  zolpidem 5 milliGRAM(s) Oral at bedtime PRN    VITALS:   T(F): 102.1  HR: 87  BP: 162/95  RR: 18  SpO2: 93%    PHYSICAL EXAM:  CONSTITUTIONAL: Appears uncomfortable from back pain  SKIN: Skin exam is warm and dry  HEAD: Normocephalic; atraumatic.  EYES: Pupils equal round reactive to light; conjunctiva and sclera clear.  NECK: Supple; non tender  CARD: Regular rate and rhythm. Normal S1, S2  RESP: Lungs clear to auscultation bilaterally. No wheezes  ABD: Abdomen soft; non-tender; non-distended  EXT: No clubbing, cyanosis or edema. R leg stiffness  NEURO: Alert and oriented x 3. No focal deficits.  PSYCH: Cooperative, appropriate.    LABS:                        16.5   10.18 )-----------( 92       ( 02 Feb 2024 06:20 )             46.1     02-02    139  |  103  |  12  ----------------------------<  87  3.3<L>   |  22  |  0.9    Ca    8.3<L>      02 Feb 2024 06:20  Mg     1.9     02-02    TPro  6.4  /  Alb  3.6  /  TBili  1.1  /  DBili  x   /  AST  26  /  ALT  <5  /  AlkPhos  76  02-02    PT/INR - ( 31 Jan 2024 13:17 )   PT: 12.60 sec;   INR: 1.10 ratio         PTT - ( 31 Jan 2024 13:17 )  PTT:28.6 sec  Urinalysis Basic - ( 02 Feb 2024 06:20 )    Color: x / Appearance: x / SG: x / pH: x  Gluc: 87 mg/dL / Ketone: x  / Bili: x / Urobili: x   Blood: x / Protein: x / Nitrite: x   Leuk Esterase: x / RBC: x / WBC x   Sq Epi: x / Non Sq Epi: x / Bacteria: x                RADIOLOGY:

## 2024-02-03 LAB
ALBUMIN SERPL ELPH-MCNC: 3.2 G/DL — LOW (ref 3.5–5.2)
ALBUMIN SERPL ELPH-MCNC: 3.2 G/DL — LOW (ref 3.5–5.2)
ALP SERPL-CCNC: 66 U/L — SIGNIFICANT CHANGE UP (ref 30–115)
ALP SERPL-CCNC: 66 U/L — SIGNIFICANT CHANGE UP (ref 30–115)
ALT FLD-CCNC: <5 U/L — SIGNIFICANT CHANGE UP (ref 0–41)
ALT FLD-CCNC: <5 U/L — SIGNIFICANT CHANGE UP (ref 0–41)
ANION GAP SERPL CALC-SCNC: 18 MMOL/L — HIGH (ref 7–14)
AST SERPL-CCNC: 22 U/L — SIGNIFICANT CHANGE UP (ref 0–41)
AST SERPL-CCNC: 23 U/L — SIGNIFICANT CHANGE UP (ref 0–41)
BASOPHILS # BLD AUTO: 0.02 K/UL — SIGNIFICANT CHANGE UP (ref 0–0.2)
BASOPHILS NFR BLD AUTO: 0.2 % — SIGNIFICANT CHANGE UP (ref 0–1)
BILIRUB DIRECT SERPL-MCNC: 0.3 MG/DL — SIGNIFICANT CHANGE UP (ref 0–0.3)
BILIRUB INDIRECT FLD-MCNC: 0.8 MG/DL — SIGNIFICANT CHANGE UP (ref 0.2–1.2)
BILIRUB SERPL-MCNC: 1 MG/DL — SIGNIFICANT CHANGE UP (ref 0.2–1.2)
BILIRUB SERPL-MCNC: 1.1 MG/DL — SIGNIFICANT CHANGE UP (ref 0.2–1.2)
BUN SERPL-MCNC: 19 MG/DL — SIGNIFICANT CHANGE UP (ref 10–20)
CALCIUM SERPL-MCNC: 8 MG/DL — LOW (ref 8.4–10.4)
CHLORIDE SERPL-SCNC: 104 MMOL/L — SIGNIFICANT CHANGE UP (ref 98–110)
CO2 SERPL-SCNC: 19 MMOL/L — SIGNIFICANT CHANGE UP (ref 17–32)
CREAT SERPL-MCNC: 1 MG/DL — SIGNIFICANT CHANGE UP (ref 0.7–1.5)
EGFR: 80 ML/MIN/1.73M2 — SIGNIFICANT CHANGE UP
EOSINOPHIL # BLD AUTO: 0.03 K/UL — SIGNIFICANT CHANGE UP (ref 0–0.7)
EOSINOPHIL NFR BLD AUTO: 0.3 % — SIGNIFICANT CHANGE UP (ref 0–8)
GLUCOSE SERPL-MCNC: 63 MG/DL — LOW (ref 70–99)
HCT VFR BLD CALC: 43.6 % — SIGNIFICANT CHANGE UP (ref 42–52)
HGB BLD-MCNC: 15.5 G/DL — SIGNIFICANT CHANGE UP (ref 14–18)
IMM GRANULOCYTES NFR BLD AUTO: 0.4 % — HIGH (ref 0.1–0.3)
LYMPHOCYTES # BLD AUTO: 1.19 K/UL — LOW (ref 1.2–3.4)
LYMPHOCYTES # BLD AUTO: 11.9 % — LOW (ref 20.5–51.1)
MAGNESIUM SERPL-MCNC: 1.7 MG/DL — LOW (ref 1.8–2.4)
MCHC RBC-ENTMCNC: 34.2 PG — HIGH (ref 27–31)
MCHC RBC-ENTMCNC: 35.6 G/DL — SIGNIFICANT CHANGE UP (ref 32–37)
MCV RBC AUTO: 96.2 FL — HIGH (ref 80–94)
MONOCYTES # BLD AUTO: 0.59 K/UL — SIGNIFICANT CHANGE UP (ref 0.1–0.6)
MONOCYTES NFR BLD AUTO: 5.9 % — SIGNIFICANT CHANGE UP (ref 1.7–9.3)
NEUTROPHILS # BLD AUTO: 8.13 K/UL — HIGH (ref 1.4–6.5)
NEUTROPHILS NFR BLD AUTO: 81.3 % — HIGH (ref 42.2–75.2)
NRBC # BLD: 0 /100 WBCS — SIGNIFICANT CHANGE UP (ref 0–0)
PLATELET # BLD AUTO: 97 K/UL — LOW (ref 130–400)
PMV BLD: 10.2 FL — SIGNIFICANT CHANGE UP (ref 7.4–10.4)
POTASSIUM SERPL-MCNC: 4.2 MMOL/L — SIGNIFICANT CHANGE UP (ref 3.5–5)
POTASSIUM SERPL-SCNC: 4.2 MMOL/L — SIGNIFICANT CHANGE UP (ref 3.5–5)
PROCALCITONIN SERPL-MCNC: 4.92 NG/ML — HIGH (ref 0.02–0.1)
PROT SERPL-MCNC: 5.7 G/DL — LOW (ref 6–8)
PROT SERPL-MCNC: 5.8 G/DL — LOW (ref 6–8)
RBC # BLD: 4.53 M/UL — LOW (ref 4.7–6.1)
RBC # FLD: 11.9 % — SIGNIFICANT CHANGE UP (ref 11.5–14.5)
SODIUM SERPL-SCNC: 141 MMOL/L — SIGNIFICANT CHANGE UP (ref 135–146)
WBC # BLD: 10 K/UL — SIGNIFICANT CHANGE UP (ref 4.8–10.8)
WBC # FLD AUTO: 10 K/UL — SIGNIFICANT CHANGE UP (ref 4.8–10.8)

## 2024-02-03 PROCEDURE — 99232 SBSQ HOSP IP/OBS MODERATE 35: CPT

## 2024-02-03 RX ORDER — MAGNESIUM SULFATE 500 MG/ML
2 VIAL (ML) INJECTION EVERY 6 HOURS
Refills: 0 | Status: COMPLETED | OUTPATIENT
Start: 2024-02-03 | End: 2024-02-03

## 2024-02-03 RX ADMIN — Medication 12.5 MILLIGRAM(S): at 06:10

## 2024-02-03 RX ADMIN — LEVETIRACETAM 500 MILLIGRAM(S): 250 TABLET, FILM COATED ORAL at 17:40

## 2024-02-03 RX ADMIN — Medication 25 GRAM(S): at 16:09

## 2024-02-03 RX ADMIN — CARBIDOPA AND LEVODOPA 2 TABLET(S): 25; 100 TABLET ORAL at 12:21

## 2024-02-03 RX ADMIN — Medication 650 MILLIGRAM(S): at 06:10

## 2024-02-03 RX ADMIN — Medication 650 MILLIGRAM(S): at 07:04

## 2024-02-03 RX ADMIN — Medication 650 MILLIGRAM(S): at 23:42

## 2024-02-03 RX ADMIN — RIVASTIGMINE 1 PATCH: 4.6 PATCH, EXTENDED RELEASE TRANSDERMAL at 12:22

## 2024-02-03 RX ADMIN — METHOCARBAMOL 750 MILLIGRAM(S): 500 TABLET, FILM COATED ORAL at 06:12

## 2024-02-03 RX ADMIN — SODIUM CHLORIDE 100 MILLILITER(S): 9 INJECTION, SOLUTION INTRAVENOUS at 06:09

## 2024-02-03 RX ADMIN — CARVEDILOL PHOSPHATE 12.5 MILLIGRAM(S): 80 CAPSULE, EXTENDED RELEASE ORAL at 06:10

## 2024-02-03 RX ADMIN — ENOXAPARIN SODIUM 40 MILLIGRAM(S): 100 INJECTION SUBCUTANEOUS at 06:14

## 2024-02-03 RX ADMIN — CARBIDOPA AND LEVODOPA 2 TABLET(S): 25; 100 TABLET ORAL at 17:40

## 2024-02-03 RX ADMIN — REMDESIVIR 200 MILLIGRAM(S): 5 INJECTION INTRAVENOUS at 19:15

## 2024-02-03 RX ADMIN — Medication 12.5 MILLIGRAM(S): at 17:40

## 2024-02-03 RX ADMIN — CHLORHEXIDINE GLUCONATE 1 APPLICATION(S): 213 SOLUTION TOPICAL at 13:07

## 2024-02-03 RX ADMIN — Medication 650 MILLIGRAM(S): at 17:41

## 2024-02-03 RX ADMIN — CARVEDILOL PHOSPHATE 12.5 MILLIGRAM(S): 80 CAPSULE, EXTENDED RELEASE ORAL at 17:40

## 2024-02-03 RX ADMIN — Medication 25 GRAM(S): at 17:40

## 2024-02-03 RX ADMIN — Medication 650 MILLIGRAM(S): at 19:20

## 2024-02-03 RX ADMIN — METHOCARBAMOL 750 MILLIGRAM(S): 500 TABLET, FILM COATED ORAL at 17:40

## 2024-02-03 RX ADMIN — Medication 81 MILLIGRAM(S): at 12:22

## 2024-02-03 RX ADMIN — ENTACAPONE 200 MILLIGRAM(S): 200 TABLET, FILM COATED ORAL at 06:10

## 2024-02-03 RX ADMIN — METHOCARBAMOL 750 MILLIGRAM(S): 500 TABLET, FILM COATED ORAL at 12:22

## 2024-02-03 RX ADMIN — Medication 6 MILLIGRAM(S): at 06:11

## 2024-02-03 RX ADMIN — CARBIDOPA AND LEVODOPA 2 TABLET(S): 25; 100 TABLET ORAL at 06:10

## 2024-02-03 RX ADMIN — Medication 650 MILLIGRAM(S): at 12:21

## 2024-02-03 RX ADMIN — RIVASTIGMINE 1 PATCH: 4.6 PATCH, EXTENDED RELEASE TRANSDERMAL at 20:45

## 2024-02-03 RX ADMIN — CARBIDOPA AND LEVODOPA 1 TABLET(S): 25; 100 TABLET ORAL at 21:09

## 2024-02-03 RX ADMIN — LEVETIRACETAM 500 MILLIGRAM(S): 250 TABLET, FILM COATED ORAL at 06:10

## 2024-02-03 RX ADMIN — AMLODIPINE BESYLATE 5 MILLIGRAM(S): 2.5 TABLET ORAL at 06:10

## 2024-02-03 RX ADMIN — Medication 200 MILLIGRAM(S): at 12:21

## 2024-02-03 RX ADMIN — ENTACAPONE 200 MILLIGRAM(S): 200 TABLET, FILM COATED ORAL at 21:08

## 2024-02-03 RX ADMIN — ENTACAPONE 200 MILLIGRAM(S): 200 TABLET, FILM COATED ORAL at 12:21

## 2024-02-03 RX ADMIN — METHOCARBAMOL 750 MILLIGRAM(S): 500 TABLET, FILM COATED ORAL at 23:43

## 2024-02-03 NOTE — PROGRESS NOTE ADULT - ASSESSMENT
Patient is a 74 y/o man with PMHx of Parkinson's disease (diagnosed 7 years ago, HTN, 2 x CVA (1998, 2016), DM, Seizure (2019) on keppra presenting to the ED after 2 x falls. 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 He has a brilliant mind. He has never smoked, drank or used and drugs in his entire life. He has lead a truly remarkable life. Pt was on tour [place] [employer] and fell on stage at end of show and needed asst getting up. He then flew to [place], but could not get out of plane. Pt was hospitalized and this was the begin of his Parkinson's.    # COVID infection; thrombocytopenia  quarantine 1/31-2/9  now symp w/ fever and RA down to 88% at times; not on O2 yet  I do NOT believe this is cause of leg weakness; but certainly is cause of gen fatigue and tiredness  CXR unremarkable  on RA  ID consult noted  plt are usually nl (as of 6/2023) -> plt should get better on their own; ok to use DVT ppx  RDV x5 days (2/2-2/6)  Dexa 6mg po q24 x up to10 days (2/2-2/11) (will likely use less to avoid steroid myopathy)    # Fall 2/2 suspected akinesia from Parkinson's disease; Hx of Seizure (2019); hx vertigo  orthostatic hypotension neg  no syncope; no vertigo   seen by ENT 06/2023: MR head negative, orthostatic vital signs negative (06/2023)  Trauma w/u negative for acute traumatic injury.   neuro feels he is having morning freeze episodes 2/2 wearing off of sinemet because of lack of long-acting version at bedtime  make sinemet 25/100 2 tab at 7am, 12 pm and 5pm  make sinemet ER 50/200 1 tab HS (10pm) - this is helping  c/w entacapone, rivastigmine    PT eval - walked 40' w/ asst; pt walked slowly and gets tired easily  neuro requesting pt go for rehab at [facility]: [phone] (I agree)  fall precautions  c/w meclizine 12.5 q12    # Hx of CVA (1996, 2016)   CT H noted: stable mod chr microvasc changes and chr lacunar infarcts  c/w asa 81 q24  not on statin - LDL 80    # hx Sz  on keppra 500mg BID    # lumbar back pain after fall  no trauma seen on CT A/P  no further imaging needed for now  no further w/u  PT eval  lido patch top q24  tyl 975mg po q8 standing  robaxin 750mg po q6  tramadol 50mg po q8 prn (do not raise dose)    # hypomagnesemia  Mg rider x2    # HTN  c/w carvedilol 12.5 q12  c/w norvasc 5mg po q24    # hx gout - pt says this has not been too bad recently  c/w allopurinol 200mg po q24  uric acid 4.9    # Insomnia   c/w Lunesta (use ambien here)    # Apical lung mass- incidental finding  CT chest (01/31/2024): 1.8 cm left apical lung mass, suspicious for malignancy-new from neck CT of 6/1/2023 were the lung apices were imaged. Indeterminate right pulmonary nodular opacities.   Pulm consult: noted  agree, no reason to bx inpt, vita w/ COVID +; do outpt PET scan and plan for outpt bx after COVID past 2 wks.    # Colonic lesion vs adherent stool  there is no rush to eval lesions that could simply be stool  outpt GI consult for colonoscopy once pt is well and past COVID quarantine    # hx of obesity; s/p lap band in past, which became infected at one point and needed surgery to fix it    # asymp GB stones  no further intervention    # rt renal calc and 8mm bladder calc; rt renal cyst; hx urine incontinence   outpt uro eval    # GI prophylaxis: None    # DVT prophylaxis: lovenox 40 q24    # Activity: need PT for back pain; WBAT;     # Code Status: Full code    # Shanon ELLSWORTH ([phone]):     Dispo: PT prn;  tx back pain; tx COVID; quarantine to 2/9;   eventually, pt will need STR - we prefer [facility] - f/u CM - anticipate d/c Mon?  outpt pulm f/u and PET scan; outpt uro f/u re stones; outpt GI eval for c-scope; outpt neuro f/u re PD; outpt IM f/u

## 2024-02-03 NOTE — PROGRESS NOTE ADULT - SUBJECTIVE AND OBJECTIVE BOX
ZAKIA MILLS  73y  Male  ***My note supersedes ALL resident notes that I sign.  My corrections for their notes are in my note.***    I can be reached directly on [id number]. My office number is [phone]. My personal cell number is [phone].    INTERVAL EVENTS: Here for f/u of COVID. Pt says that he is feeling a little better. Less tired.    T(F): 97.6 (02-03-24 @ 05:22), Max: 97.6 (02-03-24 @ 05:22)  HR: 60 (02-03-24 @ 05:22) (60 - 63)  BP: 158/77 (02-03-24 @ 05:22) (128/68 - 158/77)  RR: 18 (02-03-24 @ 05:22) (18 - 18)  SpO2: --    Gen: NAD; somewhat flat affect; tired  HEENT: PERRL, EOMI, mouth clr, nose clr  Neck: no nodes, no JVD, thyroid nl  lungs: decr BS; no wheeze; no crackles  hrt: s1 s2 rrr no murmur  abd: soft, NT/ND, no HS megaly  back: mild tender lumbar area  ext: no edema, no c/c  neuro: aa ox3, cn intact, can move all 4 ext; b/l mild hand tremor L>R    LABS:                      15.5    (    96.2   10.00 )-----------( ---------      97       ( 03 Feb 2024 08:18 )             43.6    (    11.9     Platelet Count - Automated: 97 K/uL (02-03-24 @ 08:18)  Platelet Count - Automated: 92 K/uL (02-02-24 @ 06:20)  Platelet Count - Automated: 104 K/uL (02-01-24 @ 06:23)  Platelet Count - Automated: 109 K/uL (01-31-24 @ 13:17)    141   (   104   (   63      02-03-24 @ 08:18  ----------------------               4.2   (   19   (   19                             -----                        1.0  Ca  8.0   Mg  1.7    P   --     LFT  5.7  (  1.0  (  22       02-03-24 @ 08:18  -------------------------  3.2  (  66  (  <5    Urinalysis Basic - ( 03 Feb 2024 08:18 )    Color: x / Appearance: x / SG: x / pH: x  Gluc: 63 mg/dL / Ketone: x  / Bili: x / Urobili: x   Blood: x / Protein: x / Nitrite: x   Leuk Esterase: x / RBC: x / WBC x   Sq Epi: x / Non Sq Epi: x / Bacteria: x    CAPILLARY BLOOD GLUCOSE  POCT Blood Glucose.: 91 (02-01-24 @ 07:51)  POCT Blood Glucose.: 116 (01-31-24 @ 21:36)    RADIOLOGY & ADDITIONAL TESTS:    MEDICATIONS:  remdesivir  IVPB 100 milliGRAM(s) IV Intermittent every 24 hours  remdesivir  IVPB   IV Intermittent     acetaminophen     Tablet .. 650 milliGRAM(s) Oral every 6 hours  allopurinol 200 milliGRAM(s) Oral daily  amLODIPine   Tablet 5 milliGRAM(s) Oral daily  aspirin  chewable 81 milliGRAM(s) Oral daily  carbidopa/levodopa  25/100 2 Tablet(s) Oral <User Schedule>  carbidopa/levodopa CR 50/200 1 Tablet(s) Oral <User Schedule>  carvedilol 12.5 milliGRAM(s) Oral every 12 hours  chlorhexidine 2% Cloths 1 Application(s) Topical daily  dexAMETHasone     Tablet 6 milliGRAM(s) Oral daily  enoxaparin Injectable 40 milliGRAM(s) SubCutaneous every 24 hours  entacapone 200 milliGRAM(s) Oral three times a day  lactated ringers. 1000 milliLiter(s) IV Continuous <Continuous>  levETIRAcetam 500 milliGRAM(s) Oral two times a day  lidocaine   4% Patch 1 Patch Transdermal daily  meclizine 12.5 milliGRAM(s) Oral two times a day  methocarbamol 750 milliGRAM(s) Oral every 6 hours  rivastigmine patch  9.5 mG/24 Hr(s) 1 Patch Transdermal every 24 hours  traMADol 50 milliGRAM(s) Oral every 8 hours PRN  zolpidem 5 milliGRAM(s) Oral at bedtime PRN

## 2024-02-04 LAB
ALBUMIN SERPL ELPH-MCNC: 3.4 G/DL — LOW (ref 3.5–5.2)
ALBUMIN SERPL ELPH-MCNC: 3.5 G/DL — SIGNIFICANT CHANGE UP (ref 3.5–5.2)
ALP SERPL-CCNC: 69 U/L — SIGNIFICANT CHANGE UP (ref 30–115)
ALP SERPL-CCNC: 70 U/L — SIGNIFICANT CHANGE UP (ref 30–115)
ALT FLD-CCNC: <5 U/L — SIGNIFICANT CHANGE UP (ref 0–41)
ALT FLD-CCNC: <5 U/L — SIGNIFICANT CHANGE UP (ref 0–41)
ANION GAP SERPL CALC-SCNC: 14 MMOL/L — SIGNIFICANT CHANGE UP (ref 7–14)
AST SERPL-CCNC: 19 U/L — SIGNIFICANT CHANGE UP (ref 0–41)
AST SERPL-CCNC: 20 U/L — SIGNIFICANT CHANGE UP (ref 0–41)
BASOPHILS # BLD AUTO: 0 K/UL — SIGNIFICANT CHANGE UP (ref 0–0.2)
BASOPHILS NFR BLD AUTO: 0 % — SIGNIFICANT CHANGE UP (ref 0–1)
BILIRUB DIRECT SERPL-MCNC: 0.2 MG/DL — SIGNIFICANT CHANGE UP (ref 0–0.3)
BILIRUB INDIRECT FLD-MCNC: 0.6 MG/DL — SIGNIFICANT CHANGE UP (ref 0.2–1.2)
BILIRUB SERPL-MCNC: 0.8 MG/DL — SIGNIFICANT CHANGE UP (ref 0.2–1.2)
BILIRUB SERPL-MCNC: 0.8 MG/DL — SIGNIFICANT CHANGE UP (ref 0.2–1.2)
BUN SERPL-MCNC: 21 MG/DL — HIGH (ref 10–20)
CALCIUM SERPL-MCNC: 8.5 MG/DL — SIGNIFICANT CHANGE UP (ref 8.4–10.5)
CHLORIDE SERPL-SCNC: 106 MMOL/L — SIGNIFICANT CHANGE UP (ref 98–110)
CO2 SERPL-SCNC: 21 MMOL/L — SIGNIFICANT CHANGE UP (ref 17–32)
CREAT SERPL-MCNC: 0.9 MG/DL — SIGNIFICANT CHANGE UP (ref 0.7–1.5)
EGFR: 91 ML/MIN/1.73M2 — SIGNIFICANT CHANGE UP
EOSINOPHIL # BLD AUTO: 0 K/UL — SIGNIFICANT CHANGE UP (ref 0–0.7)
EOSINOPHIL NFR BLD AUTO: 0 % — SIGNIFICANT CHANGE UP (ref 0–8)
GLUCOSE SERPL-MCNC: 213 MG/DL — HIGH (ref 70–99)
HCT VFR BLD CALC: 42.9 % — SIGNIFICANT CHANGE UP (ref 42–52)
HGB BLD-MCNC: 15.4 G/DL — SIGNIFICANT CHANGE UP (ref 14–18)
IMM GRANULOCYTES NFR BLD AUTO: 0.4 % — HIGH (ref 0.1–0.3)
LYMPHOCYTES # BLD AUTO: 1.1 K/UL — LOW (ref 1.2–3.4)
LYMPHOCYTES # BLD AUTO: 11.9 % — LOW (ref 20.5–51.1)
MAGNESIUM SERPL-MCNC: 2 MG/DL — SIGNIFICANT CHANGE UP (ref 1.8–2.4)
MCHC RBC-ENTMCNC: 33.6 PG — HIGH (ref 27–31)
MCHC RBC-ENTMCNC: 35.9 G/DL — SIGNIFICANT CHANGE UP (ref 32–37)
MCV RBC AUTO: 93.7 FL — SIGNIFICANT CHANGE UP (ref 80–94)
MONOCYTES # BLD AUTO: 0.42 K/UL — SIGNIFICANT CHANGE UP (ref 0.1–0.6)
MONOCYTES NFR BLD AUTO: 4.5 % — SIGNIFICANT CHANGE UP (ref 1.7–9.3)
NEUTROPHILS # BLD AUTO: 7.68 K/UL — HIGH (ref 1.4–6.5)
NEUTROPHILS NFR BLD AUTO: 83.2 % — HIGH (ref 42.2–75.2)
NRBC # BLD: 0 /100 WBCS — SIGNIFICANT CHANGE UP (ref 0–0)
PLATELET # BLD AUTO: 110 K/UL — LOW (ref 130–400)
PMV BLD: 9.6 FL — SIGNIFICANT CHANGE UP (ref 7.4–10.4)
POTASSIUM SERPL-MCNC: 4.8 MMOL/L — SIGNIFICANT CHANGE UP (ref 3.5–5)
POTASSIUM SERPL-SCNC: 4.8 MMOL/L — SIGNIFICANT CHANGE UP (ref 3.5–5)
PROT SERPL-MCNC: 6.2 G/DL — SIGNIFICANT CHANGE UP (ref 6–8)
PROT SERPL-MCNC: 6.3 G/DL — SIGNIFICANT CHANGE UP (ref 6–8)
RBC # BLD: 4.58 M/UL — LOW (ref 4.7–6.1)
RBC # FLD: 11.8 % — SIGNIFICANT CHANGE UP (ref 11.5–14.5)
SODIUM SERPL-SCNC: 141 MMOL/L — SIGNIFICANT CHANGE UP (ref 135–146)
WBC # BLD: 9.24 K/UL — SIGNIFICANT CHANGE UP (ref 4.8–10.8)
WBC # FLD AUTO: 9.24 K/UL — SIGNIFICANT CHANGE UP (ref 4.8–10.8)

## 2024-02-04 PROCEDURE — 99232 SBSQ HOSP IP/OBS MODERATE 35: CPT

## 2024-02-04 RX ORDER — METHOCARBAMOL 500 MG/1
750 TABLET, FILM COATED ORAL EVERY 8 HOURS
Refills: 0 | Status: DISCONTINUED | OUTPATIENT
Start: 2024-02-04 | End: 2024-02-12

## 2024-02-04 RX ADMIN — Medication 81 MILLIGRAM(S): at 11:43

## 2024-02-04 RX ADMIN — CARBIDOPA AND LEVODOPA 2 TABLET(S): 25; 100 TABLET ORAL at 06:00

## 2024-02-04 RX ADMIN — Medication 650 MILLIGRAM(S): at 19:20

## 2024-02-04 RX ADMIN — CARBIDOPA AND LEVODOPA 1 TABLET(S): 25; 100 TABLET ORAL at 21:49

## 2024-02-04 RX ADMIN — CARBIDOPA AND LEVODOPA 2 TABLET(S): 25; 100 TABLET ORAL at 17:43

## 2024-02-04 RX ADMIN — Medication 12.5 MILLIGRAM(S): at 17:43

## 2024-02-04 RX ADMIN — Medication 650 MILLIGRAM(S): at 05:22

## 2024-02-04 RX ADMIN — Medication 650 MILLIGRAM(S): at 00:30

## 2024-02-04 RX ADMIN — CARVEDILOL PHOSPHATE 12.5 MILLIGRAM(S): 80 CAPSULE, EXTENDED RELEASE ORAL at 05:22

## 2024-02-04 RX ADMIN — Medication 6 MILLIGRAM(S): at 05:21

## 2024-02-04 RX ADMIN — ENTACAPONE 200 MILLIGRAM(S): 200 TABLET, FILM COATED ORAL at 21:49

## 2024-02-04 RX ADMIN — Medication 650 MILLIGRAM(S): at 11:43

## 2024-02-04 RX ADMIN — REMDESIVIR 200 MILLIGRAM(S): 5 INJECTION INTRAVENOUS at 17:44

## 2024-02-04 RX ADMIN — Medication 200 MILLIGRAM(S): at 11:43

## 2024-02-04 RX ADMIN — CHLORHEXIDINE GLUCONATE 1 APPLICATION(S): 213 SOLUTION TOPICAL at 12:41

## 2024-02-04 RX ADMIN — ENTACAPONE 200 MILLIGRAM(S): 200 TABLET, FILM COATED ORAL at 05:22

## 2024-02-04 RX ADMIN — Medication 12.5 MILLIGRAM(S): at 05:21

## 2024-02-04 RX ADMIN — METHOCARBAMOL 750 MILLIGRAM(S): 500 TABLET, FILM COATED ORAL at 21:49

## 2024-02-04 RX ADMIN — SODIUM CHLORIDE 100 MILLILITER(S): 9 INJECTION, SOLUTION INTRAVENOUS at 18:31

## 2024-02-04 RX ADMIN — ENOXAPARIN SODIUM 40 MILLIGRAM(S): 100 INJECTION SUBCUTANEOUS at 05:22

## 2024-02-04 RX ADMIN — LEVETIRACETAM 500 MILLIGRAM(S): 250 TABLET, FILM COATED ORAL at 17:43

## 2024-02-04 RX ADMIN — ENTACAPONE 200 MILLIGRAM(S): 200 TABLET, FILM COATED ORAL at 11:42

## 2024-02-04 RX ADMIN — AMLODIPINE BESYLATE 5 MILLIGRAM(S): 2.5 TABLET ORAL at 05:22

## 2024-02-04 RX ADMIN — CARBIDOPA AND LEVODOPA 2 TABLET(S): 25; 100 TABLET ORAL at 11:43

## 2024-02-04 RX ADMIN — RIVASTIGMINE 1 PATCH: 4.6 PATCH, EXTENDED RELEASE TRANSDERMAL at 19:48

## 2024-02-04 RX ADMIN — CARVEDILOL PHOSPHATE 12.5 MILLIGRAM(S): 80 CAPSULE, EXTENDED RELEASE ORAL at 17:43

## 2024-02-04 RX ADMIN — METHOCARBAMOL 750 MILLIGRAM(S): 500 TABLET, FILM COATED ORAL at 11:42

## 2024-02-04 RX ADMIN — Medication 650 MILLIGRAM(S): at 17:43

## 2024-02-04 RX ADMIN — RIVASTIGMINE 1 PATCH: 4.6 PATCH, EXTENDED RELEASE TRANSDERMAL at 11:45

## 2024-02-04 RX ADMIN — METHOCARBAMOL 750 MILLIGRAM(S): 500 TABLET, FILM COATED ORAL at 16:33

## 2024-02-04 RX ADMIN — METHOCARBAMOL 750 MILLIGRAM(S): 500 TABLET, FILM COATED ORAL at 05:21

## 2024-02-04 RX ADMIN — Medication 650 MILLIGRAM(S): at 06:00

## 2024-02-04 RX ADMIN — LEVETIRACETAM 500 MILLIGRAM(S): 250 TABLET, FILM COATED ORAL at 05:21

## 2024-02-04 NOTE — PROGRESS NOTE ADULT - ASSESSMENT
Patient is a 74 y/o man with PMHx of Parkinson's disease (diagnosed 7 years ago, HTN, 2 x CVA (1998, 2016), DM, Seizure (2019) on keppra presenting to the ED after 2 x falls. (of note, patient was admitted for same complaints in 09/22/2022)    # COVID infection; thrombocytopenia  quarantine 1/31-2/9  now symp w/ fever and RA down to 88% at times; not on O2 yet  I do NOT believe this is cause of leg weakness; but certainly is cause of gen fatigue and tiredness  CXR unremarkable  on RA  ID consult noted  plt are usually nl (as of 6/2023) -> plt should get better on their own; ok to use DVT ppx  RDV x5 days (2/2-2/6)  Dexa 6mg po q24 x up to10 days (2/2-2/11) (will likely use less to avoid steroid myopathy)    # Fall 2/2 suspected akinesia from Parkinson's disease; Hx of Seizure (2019); hx vertigo  orthostatic hypotension neg  no syncope; no vertigo   seen by ENT 06/2023: MR head negative, orthostatic vital signs negative (06/2023)  Trauma w/u negative for acute traumatic injury.   neuro feels he is having morning freeze episodes 2/2 wearing off of sinemet because of lack of long-acting version at bedtime  make sinemet 25/100 2 tab at 7am, 12 pm and 5pm  make sinemet ER 50/200 1 tab HS (10pm) - this is helping  c/w entacapone, rivastigmine    PT eval - walked 40' w/ asst; pt walked slowly and gets tired easily  neuro requesting pt go for rehab at [facility]: [phone] (I agree)  fall precautions  c/w meclizine 12.5 q12    # Hx of CVA (1996, 2016)   CT H noted: stable mod chr microvasc changes and chr lacunar infarcts  c/w asa 81 q24  not on statin - LDL 80    # hx Sz  on keppra 500mg BID    # lumbar back pain after fall  no trauma seen on CT A/P  no further imaging needed for now  no further w/u  PT eval  lido patch top q24  tyl 975mg po q8 standing  robaxin 750mg po q6  tramadol 50mg po q8 prn (do not raise dose)    # hypomagnesemia  Mg rider x2    # HTN  c/w carvedilol 12.5 q12  c/w norvasc 5mg po q24    # hx gout - pt says this has not been too bad recently  c/w allopurinol 200mg po q24  uric acid 4.9    # Insomnia   c/w Lunesta (use ambien here)    # Apical lung mass- incidental finding  CT chest (01/31/2024): 1.8 cm left apical lung mass, suspicious for malignancy-new from neck CT of 6/1/2023 were the lung apices were imaged. Indeterminate right pulmonary nodular opacities.   Pulm consult: noted  agree, no reason to bx inpt, vita w/ COVID +; do outpt PET scan and plan for outpt bx after COVID past 2 wks.    # Colonic lesion vs adherent stool  there is no rush to eval lesions that could simply be stool  outpt GI consult for colonoscopy once pt is well and past COVID quarantine    # hx of obesity; s/p lap band in past, which became infected at one point and needed surgery to fix it    # asymp GB stones  no further intervention    # rt renal calc and 8mm bladder calc; rt renal cyst; hx urine incontinence   outpt uro eval    # GI prophylaxis: None    # DVT prophylaxis: lovenox 40 q24    # Activity: need PT for back pain; WBAT;     # Code Status: Full code    # Shanon ELLSWORTH ([phone]):     Dispo: PT prn;  tx back pain; tx COVID; quarantine to 2/9;   eventually, pt will need STR - we prefer [facility] - f/u CM - anticipate d/c Mon?  outpt pulm f/u and PET scan; outpt uro f/u re stones; outpt GI eval for c-scope; outpt neuro f/u re PD; outpt IM f/u

## 2024-02-04 NOTE — PROGRESS NOTE ADULT - SUBJECTIVE AND OBJECTIVE BOX
ZAKIA MILLS  73y  Male  ***My note supersedes ALL resident notes that I sign.  My corrections for their notes are in my note.***    I can be reached directly on [email or web address]. My office number is [phone]. My personal cell number is [phone].    INTERVAL EVENTS: Here for f/u of COVID. Pt says he is feeling much better. Feels voice and strength improving. Remains off O2 and sat better. Back pain is much better.    T(F): 97 (02-04-24 @ 13:09), Max: 97.8 (02-03-24 @ 14:19)  HR: 62 (02-04-24 @ 13:09) (62 - 82)  BP: 137/72 (02-04-24 @ 13:09) (137/72 - 156/80)  RR: 18 (02-04-24 @ 13:09) (18 - 18)  SpO2: 97% (02-03-24 @ 21:08) (97% - 97%)    Gen: NAD; somewhat flat affect; tired  HEENT: PERRL, EOMI, mouth clr, nose clr  Neck: no nodes, no JVD, thyroid nl  lungs: decr BS; no wheeze; no crackles  hrt: s1 s2 rrr no murmur  abd: soft, NT/ND, no HS megaly  back: mild tender lumbar area  ext: no edema, no c/c  neuro: aa ox3, cn intact, can move all 4 ext; b/l mild hand tremor L>R    LABS:                      15.4    (    93.7   9.24  )-----------( ---------      110      ( 04 Feb 2024 04:30 )             42.9    (    11.8     Platelet Count - Automated: 110 K/uL (02-04-24 @ 04:30)  Platelet Count - Automated: 97 K/uL (02-03-24 @ 08:18)  Platelet Count - Automated: 92 K/uL (02-02-24 @ 06:20)  Platelet Count - Automated: 104 K/uL (02-01-24 @ 06:23)  Platelet Count - Automated: 109 K/uL (01-31-24 @ 13:17)    141   (   106   (   213      02-04-24 @ 04:30  ----------------------               4.8   (   21   (   21                             -----                        0.9  Ca  8.5   Mg  2.0    P   --     LFT  6.2  (  0.8  (  20       02-04-24 @ 04:30  -------------------------  3.4  (  69  (  <5    Urinalysis Basic - ( 04 Feb 2024 04:30 )    Color: x / Appearance: x / SG: x / pH: x  Gluc: 213 mg/dL / Ketone: x  / Bili: x / Urobili: x   Blood: x / Protein: x / Nitrite: x   Leuk Esterase: x / RBC: x / WBC x   Sq Epi: x / Non Sq Epi: x / Bacteria: x    Culture - Blood (collected 02-02-24 @ 12:03)  Source: .Blood None  Preliminary Report (02-03-24 @ 23:02):    No growth at 24 hours    RADIOLOGY & ADDITIONAL TESTS:    MEDICATIONS:  remdesivir  IVPB   IV Intermittent   remdesivir  IVPB 100 milliGRAM(s) IV Intermittent every 24 hours    acetaminophen     Tablet .. 650 milliGRAM(s) Oral every 6 hours  allopurinol 200 milliGRAM(s) Oral daily  amLODIPine   Tablet 5 milliGRAM(s) Oral daily  aspirin  chewable 81 milliGRAM(s) Oral daily  carbidopa/levodopa  25/100 2 Tablet(s) Oral <User Schedule>  carbidopa/levodopa CR 50/200 1 Tablet(s) Oral <User Schedule>  carvedilol 12.5 milliGRAM(s) Oral every 12 hours  chlorhexidine 2% Cloths 1 Application(s) Topical daily  dexAMETHasone     Tablet 6 milliGRAM(s) Oral daily  enoxaparin Injectable 40 milliGRAM(s) SubCutaneous every 24 hours  entacapone 200 milliGRAM(s) Oral three times a day  lactated ringers. 1000 milliLiter(s) IV Continuous <Continuous>  levETIRAcetam 500 milliGRAM(s) Oral two times a day  lidocaine   4% Patch 1 Patch Transdermal daily  meclizine 12.5 milliGRAM(s) Oral two times a day  methocarbamol 750 milliGRAM(s) Oral every 6 hours  rivastigmine patch  9.5 mG/24 Hr(s) 1 Patch Transdermal every 24 hours  traMADol 50 milliGRAM(s) Oral every 8 hours PRN  zolpidem 5 milliGRAM(s) Oral at bedtime PRN

## 2024-02-04 NOTE — PROGRESS NOTE ADULT - ASSESSMENT
Patient is a 72 y/o man with PMHx of Parkinson's disease (diagnosed 7 years ago, HTN, 2 x CVA (1998, 2016), DM, Seizure (2019) on keppra presenting to the ED after 2 x falls. 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 He has a brilliant mind. He has never smoked, drank or used and drugs in his entire life. He has lead a truly remarkable life. Pt was on tour in [employer] and fell on stage at end of show and needed asst getting up. He then flew to [place], but could not get out of plane. Pt was hospitalized and this was the begin of his Parkinson's.    # COVID infection; thrombocytopenia  quarantine 1/31-2/9  had symp w/ fever and RA down to 88% at times; remains off O2 and RA sat 97%  I do NOT believe this is cause of leg weakness; but certainly is cause of gen fatigue and tiredness  CXR unremarkable  ID consult noted  plt are usually nl (as of 6/2023) -> plt should get better on their own; ok to use DVT ppx  RDV x5 days (2/2-2/6)  Dexa 6mg po q24 x up to10 days (2/2-2/11) (prefer to use for only 5 days to avoid steroid myopathy; also getting some mild steroid-induced hyperglycemia in BMP)  RDV and dexa could both finish up MON if being d/c'd; otherwise would finish both up on Tues regardless    # Fall 2/2 suspected akinesia from Parkinson's disease; Hx of Seizure (2019); hx vertigo  orthostatic hypotension neg  no syncope; no vertigo   seen by ENT 06/2023: MR head negative, orthostatic vital signs negative (06/2023)  Trauma w/u negative for acute traumatic injury.   neuro feels he is having morning freeze episodes 2/2 wearing off of sinemet because of lack of long-acting version at bedtime  make sinemet 25/100 2 tab at 7am, 12 pm and 5pm  make sinemet ER 50/200 1 tab HS (10pm) - this is helping  c/w entacapone, rivastigmine    PT eval - walked 40' w/ asst; pt walked slowly and gets tired easily  neuro requesting pt go for rehab at [facility]: [phone] (I agree)  fall precautions  c/w meclizine 12.5 q12    # Hx of CVA (1996, 2016)   CT H noted: stable mod chr microvasc changes and chr lacunar infarcts  c/w asa 81 q24  not on statin - LDL 80    # hx Sz  on keppra 500mg BID    # lumbar back pain after fall  no trauma seen on CT A/P  no further imaging needed for now  no further w/u  PT eval  lido patch top q24  tyl 975mg po q8 standing  decr robaxin 750mg po q8  tramadol 50mg po q8 prn     # hypomagnesemia - better    # HTN  c/w carvedilol 12.5 q12  c/w norvasc 5mg po q24    # hx gout - pt says this has not been too bad recently  c/w allopurinol 200mg po q24  uric acid 4.9    # Insomnia   c/w Lunesta (use ambien here)    # Apical lung mass- incidental finding  CT chest (01/31/2024): 1.8 cm left apical lung mass, suspicious for malignancy-new from neck CT of 6/1/2023 were the lung apices were imaged. Indeterminate right pulmonary nodular opacities.   Pulm consult: noted  agree, no reason to bx inpt, vita w/ COVID +; do outpt PET scan and plan for outpt bx after COVID past 2 wks.    # Colonic lesion vs adherent stool  there is no rush to eval lesions that could simply be stool  outpt GI consult for colonoscopy once pt is well and past COVID quarantine    # hx of obesity; s/p lap band in past, which became infected at one point and needed surgery to fix it    # asymp GB stones  no further intervention    # rt renal calc and 8mm bladder calc; rt renal cyst; hx urine incontinence   outpt uro eval    # GI prophylaxis: None    # DVT prophylaxis: lovenox 40 q24    # Activity: PT; amb w/ asst; use walker     # Code Status: Full code    # Shanon ELLSWORTH ([phone]):     Dispo: PT prn; tx back pain; tx COVID; quarantine to 2/9;   eventually, pt will need STR - we prefer [facility] - f/u CM - anticipate d/c Mon?  outpt pulm f/u and PET scan; outpt uro f/u re stones; outpt GI eval for c-scope; outpt neuro f/u re PD; outpt IM f/u

## 2024-02-04 NOTE — PROGRESS NOTE ADULT - SUBJECTIVE AND OBJECTIVE BOX
24H events:    Patient is a 73y old Male who presents with a chief complaint of Fall (03 Feb 2024 15:47)    Primary diagnosis of Fall    Today is hospital day 4d. This morning patient was seen and examined at bedside, resting comfortably in bed.    No acute or major events overnight.    Code Status:    Family communication:  Contact date:  Name of person contacted:  Relationship to patient:  Communication details:  What matters most:    PAST MEDICAL & SURGICAL HISTORY  Parkinson disease    CVA (cerebral vascular accident)    HTN (hypertension)    Diabetes mellitus    Gout    History of appendectomy      SOCIAL HISTORY:  Social History:  Denies EtOH, tobacco, illicit drug use. (31 Jan 2024 20:52)      ALLERGIES:  sulfa drugs (Unknown)  penicillin (Unknown)  cefaclor (Unknown)    MEDICATIONS:  STANDING MEDICATIONS  acetaminophen     Tablet .. 650 milliGRAM(s) Oral every 6 hours  allopurinol 200 milliGRAM(s) Oral daily  amLODIPine   Tablet 5 milliGRAM(s) Oral daily  aspirin  chewable 81 milliGRAM(s) Oral daily  carbidopa/levodopa  25/100 2 Tablet(s) Oral <User Schedule>  carbidopa/levodopa CR 50/200 1 Tablet(s) Oral <User Schedule>  carvedilol 12.5 milliGRAM(s) Oral every 12 hours  chlorhexidine 2% Cloths 1 Application(s) Topical daily  dexAMETHasone     Tablet 6 milliGRAM(s) Oral daily  enoxaparin Injectable 40 milliGRAM(s) SubCutaneous every 24 hours  entacapone 200 milliGRAM(s) Oral three times a day  lactated ringers. 1000 milliLiter(s) IV Continuous <Continuous>  levETIRAcetam 500 milliGRAM(s) Oral two times a day  lidocaine   4% Patch 1 Patch Transdermal daily  meclizine 12.5 milliGRAM(s) Oral two times a day  methocarbamol 750 milliGRAM(s) Oral every 6 hours  remdesivir  IVPB 100 milliGRAM(s) IV Intermittent every 24 hours  remdesivir  IVPB   IV Intermittent   rivastigmine patch  9.5 mG/24 Hr(s) 1 Patch Transdermal every 24 hours    PRN MEDICATIONS  traMADol 50 milliGRAM(s) Oral every 8 hours PRN  zolpidem 5 milliGRAM(s) Oral at bedtime PRN    VITALS:   T(F): 96.7  HR: 74  BP: 138/84  RR: 18  SpO2: 97%    PHYSICAL EXAM:  CONSTITUTIONAL: NAD  SKIN: Skin exam is warm and dry  HEAD: Normocephalic; atraumatic.  EYES: Pupils equal round reactive to light; conjunctiva and sclera clear.  NECK: Supple; non tender  CARD: Regular rate and rhythm. Normal S1, S2  RESP: Lungs clear to auscultation bilaterally. No wheezes  ABD: Abdomen soft; non-tender; non-distended  EXT: No clubbing, cyanosis or edema. R leg stiffness  MSK: mildly tender lumbar area  NEURO: Alert and oriented x 3.     LABS:                        15.5   10.00 )-----------( 97       ( 03 Feb 2024 08:18 )             43.6     02-03    141  |  104  |  19  ----------------------------<  63<L>  4.2   |  19  |  1.0    Ca    8.0<L>      03 Feb 2024 08:18  Mg     1.7     02-03    TPro  5.7<L>  /  Alb  3.2<L>  /  TBili  1.0  /  DBili  0.3  /  AST  22  /  ALT  <5  /  AlkPhos  66  02-03      Urinalysis Basic - ( 03 Feb 2024 08:18 )    Color: x / Appearance: x / SG: x / pH: x  Gluc: 63 mg/dL / Ketone: x  / Bili: x / Urobili: x   Blood: x / Protein: x / Nitrite: x   Leuk Esterase: x / RBC: x / WBC x   Sq Epi: x / Non Sq Epi: x / Bacteria: x            Culture - Blood (collected 02 Feb 2024 12:03)  Source: .Blood None  Preliminary Report (03 Feb 2024 23:02):    No growth at 24 hours          RADIOLOGY:

## 2024-02-05 LAB
ALBUMIN SERPL ELPH-MCNC: 2.9 G/DL — LOW (ref 3.5–5.2)
ALBUMIN SERPL ELPH-MCNC: 3.1 G/DL — LOW (ref 3.5–5.2)
ALP SERPL-CCNC: 66 U/L — SIGNIFICANT CHANGE UP (ref 30–115)
ALP SERPL-CCNC: 68 U/L — SIGNIFICANT CHANGE UP (ref 30–115)
ALT FLD-CCNC: <5 U/L — SIGNIFICANT CHANGE UP (ref 0–41)
ALT FLD-CCNC: <5 U/L — SIGNIFICANT CHANGE UP (ref 0–41)
ANION GAP SERPL CALC-SCNC: 9 MMOL/L — SIGNIFICANT CHANGE UP (ref 7–14)
AST SERPL-CCNC: 21 U/L — SIGNIFICANT CHANGE UP (ref 0–41)
AST SERPL-CCNC: 22 U/L — SIGNIFICANT CHANGE UP (ref 0–41)
BASOPHILS # BLD AUTO: 0.01 K/UL — SIGNIFICANT CHANGE UP (ref 0–0.2)
BASOPHILS NFR BLD AUTO: 0.1 % — SIGNIFICANT CHANGE UP (ref 0–1)
BILIRUB DIRECT SERPL-MCNC: <0.2 MG/DL — SIGNIFICANT CHANGE UP (ref 0–0.3)
BILIRUB INDIRECT FLD-MCNC: >0.2 MG/DL — SIGNIFICANT CHANGE UP (ref 0.2–1.2)
BILIRUB SERPL-MCNC: 0.4 MG/DL — SIGNIFICANT CHANGE UP (ref 0.2–1.2)
BILIRUB SERPL-MCNC: 0.5 MG/DL — SIGNIFICANT CHANGE UP (ref 0.2–1.2)
BUN SERPL-MCNC: 22 MG/DL — HIGH (ref 10–20)
CALCIUM SERPL-MCNC: 8.1 MG/DL — LOW (ref 8.4–10.5)
CHLORIDE SERPL-SCNC: 109 MMOL/L — SIGNIFICANT CHANGE UP (ref 98–110)
CO2 SERPL-SCNC: 25 MMOL/L — SIGNIFICANT CHANGE UP (ref 17–32)
CREAT SERPL-MCNC: 0.9 MG/DL — SIGNIFICANT CHANGE UP (ref 0.7–1.5)
EGFR: 91 ML/MIN/1.73M2 — SIGNIFICANT CHANGE UP
EOSINOPHIL # BLD AUTO: 0 K/UL — SIGNIFICANT CHANGE UP (ref 0–0.7)
EOSINOPHIL NFR BLD AUTO: 0 % — SIGNIFICANT CHANGE UP (ref 0–8)
GLUCOSE SERPL-MCNC: 143 MG/DL — HIGH (ref 70–99)
HCT VFR BLD CALC: 38.4 % — LOW (ref 42–52)
HCT VFR BLD CALC: 39.6 % — LOW (ref 42–52)
HGB BLD-MCNC: 13.7 G/DL — LOW (ref 14–18)
HGB BLD-MCNC: 14 G/DL — SIGNIFICANT CHANGE UP (ref 14–18)
IMM GRANULOCYTES NFR BLD AUTO: 0.4 % — HIGH (ref 0.1–0.3)
LYMPHOCYTES # BLD AUTO: 18.4 % — LOW (ref 20.5–51.1)
LYMPHOCYTES # BLD AUTO: 2.03 K/UL — SIGNIFICANT CHANGE UP (ref 1.2–3.4)
MAGNESIUM SERPL-MCNC: 1.8 MG/DL — SIGNIFICANT CHANGE UP (ref 1.8–2.4)
MCHC RBC-ENTMCNC: 33.7 PG — HIGH (ref 27–31)
MCHC RBC-ENTMCNC: 34 PG — HIGH (ref 27–31)
MCHC RBC-ENTMCNC: 35.4 G/DL — SIGNIFICANT CHANGE UP (ref 32–37)
MCHC RBC-ENTMCNC: 35.7 G/DL — SIGNIFICANT CHANGE UP (ref 32–37)
MCV RBC AUTO: 95.3 FL — HIGH (ref 80–94)
MCV RBC AUTO: 95.4 FL — HIGH (ref 80–94)
MONOCYTES # BLD AUTO: 0.81 K/UL — HIGH (ref 0.1–0.6)
MONOCYTES NFR BLD AUTO: 7.3 % — SIGNIFICANT CHANGE UP (ref 1.7–9.3)
NEUTROPHILS # BLD AUTO: 8.17 K/UL — HIGH (ref 1.4–6.5)
NEUTROPHILS NFR BLD AUTO: 73.8 % — SIGNIFICANT CHANGE UP (ref 42.2–75.2)
NRBC # BLD: 0 /100 WBCS — SIGNIFICANT CHANGE UP (ref 0–0)
NRBC # BLD: 0 /100 WBCS — SIGNIFICANT CHANGE UP (ref 0–0)
PLATELET # BLD AUTO: 112 K/UL — LOW (ref 130–400)
PLATELET # BLD AUTO: 130 K/UL — SIGNIFICANT CHANGE UP (ref 130–400)
PMV BLD: 10 FL — SIGNIFICANT CHANGE UP (ref 7.4–10.4)
PMV BLD: 10.4 FL — SIGNIFICANT CHANGE UP (ref 7.4–10.4)
POTASSIUM SERPL-MCNC: 4.9 MMOL/L — SIGNIFICANT CHANGE UP (ref 3.5–5)
POTASSIUM SERPL-SCNC: 4.9 MMOL/L — SIGNIFICANT CHANGE UP (ref 3.5–5)
PROT SERPL-MCNC: 5.1 G/DL — LOW (ref 6–8)
PROT SERPL-MCNC: 5.2 G/DL — LOW (ref 6–8)
RBC # BLD: 4.03 M/UL — LOW (ref 4.7–6.1)
RBC # BLD: 4.15 M/UL — LOW (ref 4.7–6.1)
RBC # FLD: 11.8 % — SIGNIFICANT CHANGE UP (ref 11.5–14.5)
RBC # FLD: 11.9 % — SIGNIFICANT CHANGE UP (ref 11.5–14.5)
SODIUM SERPL-SCNC: 143 MMOL/L — SIGNIFICANT CHANGE UP (ref 135–146)
WBC # BLD: 11.06 K/UL — HIGH (ref 4.8–10.8)
WBC # BLD: 9.35 K/UL — SIGNIFICANT CHANGE UP (ref 4.8–10.8)
WBC # FLD AUTO: 11.06 K/UL — HIGH (ref 4.8–10.8)
WBC # FLD AUTO: 9.35 K/UL — SIGNIFICANT CHANGE UP (ref 4.8–10.8)

## 2024-02-05 PROCEDURE — 99232 SBSQ HOSP IP/OBS MODERATE 35: CPT

## 2024-02-05 RX ORDER — ZALEPLON 10 MG
5 CAPSULE ORAL AT BEDTIME
Refills: 0 | Status: DISCONTINUED | OUTPATIENT
Start: 2024-02-05 | End: 2024-02-05

## 2024-02-05 RX ADMIN — LIDOCAINE 1 PATCH: 4 CREAM TOPICAL at 20:01

## 2024-02-05 RX ADMIN — CARBIDOPA AND LEVODOPA 1 TABLET(S): 25; 100 TABLET ORAL at 22:10

## 2024-02-05 RX ADMIN — ENTACAPONE 200 MILLIGRAM(S): 200 TABLET, FILM COATED ORAL at 06:01

## 2024-02-05 RX ADMIN — RIVASTIGMINE 1 PATCH: 4.6 PATCH, EXTENDED RELEASE TRANSDERMAL at 11:28

## 2024-02-05 RX ADMIN — CARBIDOPA AND LEVODOPA 2 TABLET(S): 25; 100 TABLET ORAL at 06:02

## 2024-02-05 RX ADMIN — ENTACAPONE 200 MILLIGRAM(S): 200 TABLET, FILM COATED ORAL at 13:37

## 2024-02-05 RX ADMIN — Medication 650 MILLIGRAM(S): at 11:30

## 2024-02-05 RX ADMIN — METHOCARBAMOL 750 MILLIGRAM(S): 500 TABLET, FILM COATED ORAL at 13:37

## 2024-02-05 RX ADMIN — Medication 650 MILLIGRAM(S): at 01:20

## 2024-02-05 RX ADMIN — RIVASTIGMINE 1 PATCH: 4.6 PATCH, EXTENDED RELEASE TRANSDERMAL at 07:56

## 2024-02-05 RX ADMIN — Medication 12.5 MILLIGRAM(S): at 18:30

## 2024-02-05 RX ADMIN — Medication 650 MILLIGRAM(S): at 06:50

## 2024-02-05 RX ADMIN — Medication 650 MILLIGRAM(S): at 19:30

## 2024-02-05 RX ADMIN — Medication 650 MILLIGRAM(S): at 00:27

## 2024-02-05 RX ADMIN — CHLORHEXIDINE GLUCONATE 1 APPLICATION(S): 213 SOLUTION TOPICAL at 11:38

## 2024-02-05 RX ADMIN — LIDOCAINE 1 PATCH: 4 CREAM TOPICAL at 11:29

## 2024-02-05 RX ADMIN — Medication 650 MILLIGRAM(S): at 18:31

## 2024-02-05 RX ADMIN — Medication 200 MILLIGRAM(S): at 11:30

## 2024-02-05 RX ADMIN — Medication 6 MILLIGRAM(S): at 06:01

## 2024-02-05 RX ADMIN — ENOXAPARIN SODIUM 40 MILLIGRAM(S): 100 INJECTION SUBCUTANEOUS at 06:02

## 2024-02-05 RX ADMIN — RIVASTIGMINE 1 PATCH: 4.6 PATCH, EXTENDED RELEASE TRANSDERMAL at 20:02

## 2024-02-05 RX ADMIN — METHOCARBAMOL 750 MILLIGRAM(S): 500 TABLET, FILM COATED ORAL at 06:01

## 2024-02-05 RX ADMIN — Medication 650 MILLIGRAM(S): at 06:01

## 2024-02-05 RX ADMIN — LEVETIRACETAM 500 MILLIGRAM(S): 250 TABLET, FILM COATED ORAL at 06:00

## 2024-02-05 RX ADMIN — ENTACAPONE 200 MILLIGRAM(S): 200 TABLET, FILM COATED ORAL at 22:01

## 2024-02-05 RX ADMIN — Medication 12.5 MILLIGRAM(S): at 06:01

## 2024-02-05 RX ADMIN — METHOCARBAMOL 750 MILLIGRAM(S): 500 TABLET, FILM COATED ORAL at 22:01

## 2024-02-05 RX ADMIN — CARBIDOPA AND LEVODOPA 2 TABLET(S): 25; 100 TABLET ORAL at 11:30

## 2024-02-05 RX ADMIN — CARVEDILOL PHOSPHATE 12.5 MILLIGRAM(S): 80 CAPSULE, EXTENDED RELEASE ORAL at 18:30

## 2024-02-05 RX ADMIN — LEVETIRACETAM 500 MILLIGRAM(S): 250 TABLET, FILM COATED ORAL at 18:31

## 2024-02-05 RX ADMIN — Medication 650 MILLIGRAM(S): at 11:38

## 2024-02-05 RX ADMIN — CARBIDOPA AND LEVODOPA 2 TABLET(S): 25; 100 TABLET ORAL at 18:31

## 2024-02-05 RX ADMIN — AMLODIPINE BESYLATE 5 MILLIGRAM(S): 2.5 TABLET ORAL at 06:01

## 2024-02-05 RX ADMIN — RIVASTIGMINE 1 PATCH: 4.6 PATCH, EXTENDED RELEASE TRANSDERMAL at 11:38

## 2024-02-05 RX ADMIN — REMDESIVIR 200 MILLIGRAM(S): 5 INJECTION INTRAVENOUS at 15:23

## 2024-02-05 RX ADMIN — Medication 81 MILLIGRAM(S): at 11:30

## 2024-02-05 NOTE — PROGRESS NOTE ADULT - ASSESSMENT
Patient is a 74 y/o man with PMHx of Parkinson's disease (diagnosed 7 years ago, HTN, 2 x CVA (1998, 2016), DM, Seizure (2019) on keppra presenting to the ED after 2 x falls. 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 He has a brilliant mind. He has never smoked, drank or used and drugs in his entire life. He has lead a truly remarkable life. Pt was on tour in [employer] and fell on stage at end of show and needed asst getting up. He then flew to [place], but could not get out of plane. Pt was hospitalized and this was the begin of his Parkinson's.    # COVID infection; thrombocytopenia  quarantine 1/31-2/9  had symp w/ fever and RA down to 88% at times; remains off O2 and RA sat 97%  gen fatigue and tiredness likely due ot covid   CXR unremarkable  ID consult noted, follow recommendations   plt are usually nl (as of 6/2023) -> plt should get better on their own; ok to use DVT ppx  RDV x5 days (2/2-2/6)  Dexa 6mg po q24 x up to10 days (2/2-2/11) as per ID   possible discharge tomorrow if clinically doing better and accepted by facility     # Fall 2/2 suspected akinesia from Parkinson's disease; Hx of Seizure (2019); hx vertigo  orthostatic hypotension neg  no syncope; no vertigo   seen by ENT 06/2023: MR head negative, orthostatic vital signs negative (06/2023)  Trauma w/u negative for acute traumatic injury.   neuro feels he is having morning freeze episodes 2/2 wearing off of sinemet because of lack of long-acting version at bedtime  make sinemet 25/100 2 tab at 7am, 12 pm and 5pm  make sinemet ER 50/200 1 tab HS (10pm) - this is helping  c/w entacapone, rivastigmine    PT eval - walked 40' w/ asst; pt walked slowly and gets tired easily  neuro requesting pt go for rehab at [facility]: [phone] (I agree).   fall precautions  c/w meclizine 12.5 q12    # Hx of CVA (1996, 2016)   CT H noted: stable mod chr microvasc changes and chr lacunar infarcts  c/w asa 81 q24  not on statin - LDL 80    # hx Sz  on keppra 500mg BID    # lumbar back pain after fall  no trauma seen on CT A/P  no further imaging needed for now  no further w/u  PT eval  lido patch top q24  tyl 975mg po q8 standing  decr robaxin 750mg po q8  tramadol 50mg po q8 prn     # hypomagnesemia - better    # HTN  c/w carvedilol 12.5 q12  c/w norvasc 5mg po q24    # hx gout - pt says this has not been too bad recently  c/w allopurinol 200mg po q24  uric acid 4.9    # Insomnia   c/w Lunesta (use ambien here)    # Apical lung mass- incidental finding  CT chest (01/31/2024): 1.8 cm left apical lung mass, suspicious for malignancy-new from neck CT of 6/1/2023 were the lung apices were imaged. Indeterminate right pulmonary nodular opacities.   Pulm consult: noted,. OP follow up, pet scan and biopsy    # Colonic lesion vs adherent stool  there is no rush to eval lesions that could simply be stool  inpatient vs outpt GI consult for colonoscopy   follow GI. of no plan for inpatient colonoscopy need to follow up with GI OP     # hx of obesity; s/p lap band in past, which became infected at one point and needed surgery to fix it    # asymp GB stones  no further intervention    # rt renal calc and 8mm bladder calc; rt renal cyst; hx urine incontinence   outpt uro eval  asymptomatic     # GI prophylaxis: None    # DVT prophylaxis: lovenox 40 q24    # Activity: PT; amb w/ asst; use walker     # Code Status: Full code    # Shanon ELLSWORTH ([phone]):     Dispo: PT prn; tx back pain; tx COVID; quarantine to 2/9;   eventually, pt will need STR - we prefer [facility] - f/u CM -   outpt pulm f/u and PET scan; outpt uro f/u re stones; outpt GI eval for c-scope (if no inpatinet plan); outpt neuro f/u re PD; outpt IM f/u

## 2024-02-05 NOTE — PROGRESS NOTE ADULT - SUBJECTIVE AND OBJECTIVE BOX
24H events:    Patient is a 73y old Male who presents with a chief complaint of Fall (04 Feb 2024 13:26)    Primary diagnosis of Fall    Today is hospital day 5d. This morning patient was seen and examined at bedside, resting comfortably in bed.    No acute or major events overnight.    The patient endorses having some cough when he wakes up in the morning and it resolves as the day progresses. No SOB or chest pain, states that he feels weak but otherwise no other complaints.    Code Status:  Full      PAST MEDICAL & SURGICAL HISTORY  Parkinson disease    CVA (cerebral vascular accident)    HTN (hypertension)    Diabetes mellitus    Gout    History of appendectomy      SOCIAL HISTORY:  Social History:  Denies EtOH, tobacco, illicit drug use. (31 Jan 2024 20:52)      ALLERGIES:  sulfa drugs (Unknown)  penicillin (Unknown)  cefaclor (Unknown)    MEDICATIONS:  STANDING MEDICATIONS  acetaminophen     Tablet .. 650 milliGRAM(s) Oral every 6 hours  allopurinol 200 milliGRAM(s) Oral daily  amLODIPine   Tablet 5 milliGRAM(s) Oral daily  aspirin  chewable 81 milliGRAM(s) Oral daily  carbidopa/levodopa  25/100 2 Tablet(s) Oral <User Schedule>  carbidopa/levodopa CR 50/200 1 Tablet(s) Oral <User Schedule>  carvedilol 12.5 milliGRAM(s) Oral every 12 hours  chlorhexidine 2% Cloths 1 Application(s) Topical daily  dexAMETHasone     Tablet 6 milliGRAM(s) Oral daily  enoxaparin Injectable 40 milliGRAM(s) SubCutaneous every 24 hours  entacapone 200 milliGRAM(s) Oral three times a day  lactated ringers. 1000 milliLiter(s) IV Continuous <Continuous>  levETIRAcetam 500 milliGRAM(s) Oral two times a day  lidocaine   4% Patch 1 Patch Transdermal daily  meclizine 12.5 milliGRAM(s) Oral two times a day  methocarbamol 750 milliGRAM(s) Oral every 8 hours  remdesivir  IVPB   IV Intermittent   remdesivir  IVPB 100 milliGRAM(s) IV Intermittent every 24 hours  rivastigmine patch  9.5 mG/24 Hr(s) 1 Patch Transdermal every 24 hours    PRN MEDICATIONS  traMADol 50 milliGRAM(s) Oral every 8 hours PRN  zolpidem 5 milliGRAM(s) Oral at bedtime PRN    VITALS:   T(F): 98.6  HR: 57  BP: 114/59  RR: 18  SpO2: 96%    PHYSICAL EXAM:  GENERAL:   ( X ) NAD, lying in bed comfortably     (  ) obtunded     (  ) lethargic     (  ) somnolent    HEAD:   ( X ) Atraumatic     (  ) hematoma     (  ) laceration (specify location:       )     NECK:  ( X ) Supple     (  ) neck stiffness     (  ) nuchal rigidity     (  )  no JVD     (  ) JVD present ( -- cm)    HEART:  Rate -->     ( X ) normal rate     (  ) bradycardic     (  ) tachycardic  Rhythm -->     ( X ) regular     (  ) regularly irregular     (  ) irregularly irregular  Murmurs -->     ( X ) normal s1s2     (  ) systolic murmur     (  ) diastolic murmur     (  ) continuous murmur      (  ) S3 present     (  ) S4 present    LUNGS:   ( X ) Unlabored respirations     (  ) tachypnea  ( X ) B/L air entry     (  ) decreased breath sounds in:  (location     )    ( X ) no adventitious sound     (  ) crackles     (  ) wheezing      (  ) rhonchi      (specify location:       )  (  ) chest wall tenderness (specify location:       )    ABDOMEN:   ( X ) Soft     (  ) tense   |   ( X ) nondistended     (  ) distended   |   (  ) +BS     (  ) hypoactive bowel sounds     (  ) hyperactive bowel sounds  ( X ) nontender     (  ) RUQ tenderness     (  ) RLQ tenderness     (  ) LLQ tenderness     (  ) epigastric tenderness     (  ) diffuse tenderness  (  ) Splenomegaly      (  ) Hepatomegaly      (  ) Jaundice     (  ) ecchymosis     EXTREMITIES:  ( X ) Normal     (  ) Rash     (  ) ecchymosis     (  ) varicose veins      (  ) pitting edema     (  ) non-pitting edema   (  ) ulceration     (  ) gangrene:     (location:     )    NERVOUS SYSTEM:    ( X ) A&Ox3     (  ) confused     (  ) lethargic  CN II-XII:     ( X ) Intact     (  ) deficits found     (Specify:     )   Upper extremities:     ( X ) no sensorimotor deficits     (  ) weakness     (  ) loss of proprioception/vibration     (  ) loss of touch/temperature (specify:    )  Lower extremities:     ( X ) no sensorimotor deficits     (  ) weakness     (  ) loss of proprioception/vibration     (  ) loss of touch/temperature (specify:    )      LABS:                        13.7   11.06 )-----------( 112      ( 05 Feb 2024 07:41 )             38.4     02-05    143  |  109  |  22<H>  ----------------------------<  143<H>  4.9   |  25  |  0.9    Ca    8.1<L>      05 Feb 2024 07:41  Mg     1.8     02-05    TPro  5.2<L>  /  Alb  3.1<L>  /  TBili  0.5  /  DBili  <0.2  /  AST  21  /  ALT  <5  /  AlkPhos  68  02-05

## 2024-02-05 NOTE — PROGRESS NOTE ADULT - ASSESSMENT
Patient is a 74 y/o man with PMHx of Parkinson's disease (diagnosed 7 years ago, HTN, 2 x CVA (1998, 2016), DM, Seizure (2019) on keppra presenting to the ED after 2 x falls. (of note, patient was admitted for same complaints in 09/22/2022)    # COVID infection; thrombocytopenia  - Quarantine 1/31-2/9  - Not on oxygen  - RDV 5-day course last dose 2/6  - Likely the cause of fatigue  - CT chest 1/31 w/ IV: 1.8 cm left apical lung mass, suspicious for malignancy-new from neck CT of 6/1/2023 were the lung apices were imaged. Recommend oncologic evaluation. Pulm 2/1 --> outpatient PET/CT and PFTs.  Platelets improving  Dexa 6mg po q24 x up to10 days (2/2-2/11) (will likely use less to avoid steroid myopathy)    # Fall 2/2 suspected akinesia from Parkinson's disease; Hx of Seizure (2019); hx vertigo  - orthostatic hypotension neg  - no syncope; no vertigo   - seen by ENT 06/2023: MR head negative, orthostatic vital signs negative (06/2023)  - Trauma w/u negative for acute traumatic injury.   - neuro feels he is having morning freeze episodes 2/2 wearing off of sinemet because of lack of long-acting version at bedtime  - sinemet 25/100 2 tab at 7am, 12 pm and 5pm  - sinemet ER 50/200 1 tab HS (10pm) - this is helping  - c/w entacapone, rivastigmine    - PT eval - walked 40' w/ asst; pt walked slowly and gets tired easily  - neuro requesting pt go for rehab at [facility]: [phone]  - fall precautions  - c/w meclizine 12.5 q12  - Family is looking for new neurologist, can provide information to movement specialist - Dr. Nicholas or Dr. Garnica after discharge    # Hx of CVA (1996, 2016)   - CT H noted: stable mod chr microvasc changes and chr lacunar infarcts  - c/w asa 81 q24  - not on statin - LDL 80    # hx Sz  - on keppra 500mg BID    # lumbar back pain after fall - improving  no trauma seen on CT A/P  no further imaging needed for now  no further w/u  PT eval 2/2 --> home PT with RW  lido patch top q24  tyl 975mg po q8 standing  robaxin 750mg po q6  tramadol 50mg po q8 prn (do not raise dose)    # hypomagnesemia  - Replete PRN    # HTN  c/w carvedilol 12.5 q12  c/w norvasc 5mg po q24    # hx gout - pt says this has not been too bad recently  c/w allopurinol 200mg po q24  uric acid 4.9    # Insomnia   - Neurology recommends using zaleplon due to reduced risk of fall    # Apical lung mass- incidental finding  CT chest (01/31/2024): 1.8 cm left apical lung mass, suspicious for malignancy-new from neck CT of 6/1/2023 were the lung apices were imaged. Indeterminate right pulmonary nodular opacities.   Pulm consult 2/1 --> outpatient PET/CT and PFTs    # Colonic lesion vs adherent stool  - GI consult requested on 2/5 per attending    # hx of obesity; s/p lap band in past, which became infected at one point and needed surgery to fix it    # asymp GB stones  no further intervention    # rt renal calc and 8mm bladder calc; rt renal cyst; hx urine incontinence   outpt uro eval    # GI prophylaxis: None    # DVT prophylaxis: Lovenox 40 q24    # Activity: need PT for back pain; WBAT;     # Code Status: Full code    # Shanon ELLSWORTH ([phone]):     Dispo: PT prn;  tx back pain; tx COVID; quarantine to 2/9;   eventually, pt will need STR - we prefer [facility]  outpt pulm f/u and PET scan; outpt uro f/u re stones; GI eval for c-scope; outpt neuro f/u re PD; outpt IM f/u

## 2024-02-05 NOTE — PROGRESS NOTE ADULT - SUBJECTIVE AND OBJECTIVE BOX
ZAKIA MILLS  73y  Male  ***My note supersedes ALL resident notes that I sign.  My corrections for their notes are in my note.***      INTERVAL EVENTS: Here for f/u of COVID. patient better. on remdesevir.   updated patient regarding lung mass.     Vital Signs Last 24 Hrs  T(C): 37 (05 Feb 2024 13:46), Max: 37 (05 Feb 2024 13:46)  T(F): 98.6 (05 Feb 2024 13:46), Max: 98.6 (05 Feb 2024 13:46)  HR: 57 (05 Feb 2024 13:46) (50 - 67)  BP: 114/59 (05 Feb 2024 13:46) (114/59 - 164/89)  BP(mean): --  RR: 18 (05 Feb 2024 13:46) (18 - 18)  SpO2: 96% (05 Feb 2024 05:10) (96% - 96%)        Gen: NAD; somewhat flat affect; tired  HEENT: PERRL, EOMI, mouth clr, nose clr  Neck: no nodes, no JVD, thyroid nl  lungs: decr BS; no wheeze; no crackles  hrt: s1 s2 rrr no murmur  abd: soft, NT/ND, no HS megaly  back: mild tender lumbar area  ext: no edema, no c/c  neuro: aa ox3, cn intact, can move all 4 ext; b/l mild hand tremor L>R    LABS:               LABS:                        13.7   11.06 )-----------( 112      ( 05 Feb 2024 07:41 )             38.4     02-05    143  |  109  |  22<H>  ----------------------------<  143<H>  4.9   |  25  |  0.9    Ca    8.1<L>      05 Feb 2024 07:41  Mg     1.8     02-05    TPro  5.2<L>  /  Alb  3.1<L>  /  TBili  0.5  /  DBili  <0.2  /  AST  21  /  ALT  <5  /  AlkPhos  68  02-05          LFT  6.2  (  0.8  (  20       02-04-24 @ 04:30  -------------------------  3.4  (  69  (  <5    Urinalysis Basic - ( 04 Feb 2024 04:30 )    Color: x / Appearance: x / SG: x / pH: x  Gluc: 213 mg/dL / Ketone: x  / Bili: x / Urobili: x   Blood: x / Protein: x / Nitrite: x   Leuk Esterase: x / RBC: x / WBC x   Sq Epi: x / Non Sq Epi: x / Bacteria: x    Culture - Blood (collected 02-02-24 @ 12:03)  Source: .Blood None  Preliminary Report (02-03-24 @ 23:02):    No growth at 24 hours    RADIOLOGY & ADDITIONAL TESTS:    MEDICATIONS:  remdesivir  IVPB   IV Intermittent   remdesivir  IVPB 100 milliGRAM(s) IV Intermittent every 24 hours    acetaminophen     Tablet .. 650 milliGRAM(s) Oral every 6 hours  allopurinol 200 milliGRAM(s) Oral daily  amLODIPine   Tablet 5 milliGRAM(s) Oral daily  aspirin  chewable 81 milliGRAM(s) Oral daily  carbidopa/levodopa  25/100 2 Tablet(s) Oral <User Schedule>  carbidopa/levodopa CR 50/200 1 Tablet(s) Oral <User Schedule>  carvedilol 12.5 milliGRAM(s) Oral every 12 hours  chlorhexidine 2% Cloths 1 Application(s) Topical daily  dexAMETHasone     Tablet 6 milliGRAM(s) Oral daily  enoxaparin Injectable 40 milliGRAM(s) SubCutaneous every 24 hours  entacapone 200 milliGRAM(s) Oral three times a day  lactated ringers. 1000 milliLiter(s) IV Continuous <Continuous>  levETIRAcetam 500 milliGRAM(s) Oral two times a day  lidocaine   4% Patch 1 Patch Transdermal daily  meclizine 12.5 milliGRAM(s) Oral two times a day  methocarbamol 750 milliGRAM(s) Oral every 6 hours  rivastigmine patch  9.5 mG/24 Hr(s) 1 Patch Transdermal every 24 hours  traMADol 50 milliGRAM(s) Oral every 8 hours PRN  zolpidem 5 milliGRAM(s) Oral at bedtime PRN

## 2024-02-06 LAB
ALBUMIN SERPL ELPH-MCNC: 3 G/DL — LOW (ref 3.5–5.2)
ALP SERPL-CCNC: 75 U/L — SIGNIFICANT CHANGE UP (ref 30–115)
ALT FLD-CCNC: <5 U/L — SIGNIFICANT CHANGE UP (ref 0–41)
ANION GAP SERPL CALC-SCNC: 10 MMOL/L — SIGNIFICANT CHANGE UP (ref 7–14)
AST SERPL-CCNC: 25 U/L — SIGNIFICANT CHANGE UP (ref 0–41)
BASOPHILS # BLD AUTO: 0.01 K/UL — SIGNIFICANT CHANGE UP (ref 0–0.2)
BASOPHILS NFR BLD AUTO: 0.1 % — SIGNIFICANT CHANGE UP (ref 0–1)
BILIRUB DIRECT SERPL-MCNC: <0.2 MG/DL — SIGNIFICANT CHANGE UP (ref 0–0.3)
BILIRUB INDIRECT FLD-MCNC: >0.2 MG/DL — SIGNIFICANT CHANGE UP (ref 0.2–1.2)
BILIRUB SERPL-MCNC: 0.4 MG/DL — SIGNIFICANT CHANGE UP (ref 0.2–1.2)
BUN SERPL-MCNC: 20 MG/DL — SIGNIFICANT CHANGE UP (ref 10–20)
CALCIUM SERPL-MCNC: 8.1 MG/DL — LOW (ref 8.4–10.5)
CHLORIDE SERPL-SCNC: 109 MMOL/L — SIGNIFICANT CHANGE UP (ref 98–110)
CO2 SERPL-SCNC: 23 MMOL/L — SIGNIFICANT CHANGE UP (ref 17–32)
CREAT SERPL-MCNC: 0.9 MG/DL — SIGNIFICANT CHANGE UP (ref 0.7–1.5)
EGFR: 91 ML/MIN/1.73M2 — SIGNIFICANT CHANGE UP
EOSINOPHIL # BLD AUTO: 0 K/UL — SIGNIFICANT CHANGE UP (ref 0–0.7)
EOSINOPHIL NFR BLD AUTO: 0 % — SIGNIFICANT CHANGE UP (ref 0–8)
GLUCOSE SERPL-MCNC: 130 MG/DL — HIGH (ref 70–99)
HCT VFR BLD CALC: 39.1 % — LOW (ref 42–52)
HGB BLD-MCNC: 13.9 G/DL — LOW (ref 14–18)
IMM GRANULOCYTES NFR BLD AUTO: 0.6 % — HIGH (ref 0.1–0.3)
LYMPHOCYTES # BLD AUTO: 2.23 K/UL — SIGNIFICANT CHANGE UP (ref 1.2–3.4)
LYMPHOCYTES # BLD AUTO: 22.2 % — SIGNIFICANT CHANGE UP (ref 20.5–51.1)
MCHC RBC-ENTMCNC: 34 PG — HIGH (ref 27–31)
MCHC RBC-ENTMCNC: 35.5 G/DL — SIGNIFICANT CHANGE UP (ref 32–37)
MCV RBC AUTO: 95.6 FL — HIGH (ref 80–94)
MONOCYTES # BLD AUTO: 0.77 K/UL — HIGH (ref 0.1–0.6)
MONOCYTES NFR BLD AUTO: 7.7 % — SIGNIFICANT CHANGE UP (ref 1.7–9.3)
NEUTROPHILS # BLD AUTO: 6.98 K/UL — HIGH (ref 1.4–6.5)
NEUTROPHILS NFR BLD AUTO: 69.4 % — SIGNIFICANT CHANGE UP (ref 42.2–75.2)
NRBC # BLD: 0 /100 WBCS — SIGNIFICANT CHANGE UP (ref 0–0)
PLATELET # BLD AUTO: 119 K/UL — LOW (ref 130–400)
PMV BLD: 10.4 FL — SIGNIFICANT CHANGE UP (ref 7.4–10.4)
POTASSIUM SERPL-MCNC: 4.6 MMOL/L — SIGNIFICANT CHANGE UP (ref 3.5–5)
POTASSIUM SERPL-SCNC: 4.6 MMOL/L — SIGNIFICANT CHANGE UP (ref 3.5–5)
PROT SERPL-MCNC: 5.4 G/DL — LOW (ref 6–8)
RBC # BLD: 4.09 M/UL — LOW (ref 4.7–6.1)
RBC # FLD: 11.9 % — SIGNIFICANT CHANGE UP (ref 11.5–14.5)
SODIUM SERPL-SCNC: 142 MMOL/L — SIGNIFICANT CHANGE UP (ref 135–146)
WBC # BLD: 10.05 K/UL — SIGNIFICANT CHANGE UP (ref 4.8–10.8)
WBC # FLD AUTO: 10.05 K/UL — SIGNIFICANT CHANGE UP (ref 4.8–10.8)

## 2024-02-06 PROCEDURE — 99232 SBSQ HOSP IP/OBS MODERATE 35: CPT

## 2024-02-06 PROCEDURE — 99223 1ST HOSP IP/OBS HIGH 75: CPT

## 2024-02-06 PROCEDURE — 93010 ELECTROCARDIOGRAM REPORT: CPT

## 2024-02-06 RX ORDER — SENNA PLUS 8.6 MG/1
2 TABLET ORAL AT BEDTIME
Refills: 0 | Status: DISCONTINUED | OUTPATIENT
Start: 2024-02-06 | End: 2024-02-12

## 2024-02-06 RX ORDER — POLYETHYLENE GLYCOL 3350 17 G/17G
17 POWDER, FOR SOLUTION ORAL EVERY 24 HOURS
Refills: 0 | Status: DISCONTINUED | OUTPATIENT
Start: 2024-02-06 | End: 2024-02-12

## 2024-02-06 RX ADMIN — LIDOCAINE 1 PATCH: 4 CREAM TOPICAL at 00:13

## 2024-02-06 RX ADMIN — ENTACAPONE 200 MILLIGRAM(S): 200 TABLET, FILM COATED ORAL at 13:49

## 2024-02-06 RX ADMIN — Medication 650 MILLIGRAM(S): at 05:22

## 2024-02-06 RX ADMIN — ENOXAPARIN SODIUM 40 MILLIGRAM(S): 100 INJECTION SUBCUTANEOUS at 05:23

## 2024-02-06 RX ADMIN — CARBIDOPA AND LEVODOPA 2 TABLET(S): 25; 100 TABLET ORAL at 07:08

## 2024-02-06 RX ADMIN — Medication 650 MILLIGRAM(S): at 17:54

## 2024-02-06 RX ADMIN — Medication 12.5 MILLIGRAM(S): at 05:23

## 2024-02-06 RX ADMIN — RIVASTIGMINE 1 PATCH: 4.6 PATCH, EXTENDED RELEASE TRANSDERMAL at 11:13

## 2024-02-06 RX ADMIN — Medication 650 MILLIGRAM(S): at 22:30

## 2024-02-06 RX ADMIN — METHOCARBAMOL 750 MILLIGRAM(S): 500 TABLET, FILM COATED ORAL at 05:23

## 2024-02-06 RX ADMIN — SENNA PLUS 2 TABLET(S): 8.6 TABLET ORAL at 21:24

## 2024-02-06 RX ADMIN — CARBIDOPA AND LEVODOPA 2 TABLET(S): 25; 100 TABLET ORAL at 17:54

## 2024-02-06 RX ADMIN — RIVASTIGMINE 1 PATCH: 4.6 PATCH, EXTENDED RELEASE TRANSDERMAL at 11:20

## 2024-02-06 RX ADMIN — CARBIDOPA AND LEVODOPA 1 TABLET(S): 25; 100 TABLET ORAL at 21:24

## 2024-02-06 RX ADMIN — CHLORHEXIDINE GLUCONATE 1 APPLICATION(S): 213 SOLUTION TOPICAL at 11:11

## 2024-02-06 RX ADMIN — LIDOCAINE 1 PATCH: 4 CREAM TOPICAL at 11:12

## 2024-02-06 RX ADMIN — Medication 200 MILLIGRAM(S): at 11:10

## 2024-02-06 RX ADMIN — Medication 12.5 MILLIGRAM(S): at 17:53

## 2024-02-06 RX ADMIN — AMLODIPINE BESYLATE 5 MILLIGRAM(S): 2.5 TABLET ORAL at 05:23

## 2024-02-06 RX ADMIN — Medication 81 MILLIGRAM(S): at 11:10

## 2024-02-06 RX ADMIN — METHOCARBAMOL 750 MILLIGRAM(S): 500 TABLET, FILM COATED ORAL at 13:49

## 2024-02-06 RX ADMIN — RIVASTIGMINE 1 PATCH: 4.6 PATCH, EXTENDED RELEASE TRANSDERMAL at 17:55

## 2024-02-06 RX ADMIN — LEVETIRACETAM 500 MILLIGRAM(S): 250 TABLET, FILM COATED ORAL at 17:53

## 2024-02-06 RX ADMIN — POLYETHYLENE GLYCOL 3350 17 GRAM(S): 17 POWDER, FOR SOLUTION ORAL at 11:15

## 2024-02-06 RX ADMIN — Medication 650 MILLIGRAM(S): at 00:50

## 2024-02-06 RX ADMIN — REMDESIVIR 200 MILLIGRAM(S): 5 INJECTION INTRAVENOUS at 15:31

## 2024-02-06 RX ADMIN — LIDOCAINE 1 PATCH: 4 CREAM TOPICAL at 17:54

## 2024-02-06 RX ADMIN — Medication 650 MILLIGRAM(S): at 22:07

## 2024-02-06 RX ADMIN — Medication 650 MILLIGRAM(S): at 00:12

## 2024-02-06 RX ADMIN — CARBIDOPA AND LEVODOPA 2 TABLET(S): 25; 100 TABLET ORAL at 11:11

## 2024-02-06 RX ADMIN — Medication 650 MILLIGRAM(S): at 06:00

## 2024-02-06 RX ADMIN — ENTACAPONE 200 MILLIGRAM(S): 200 TABLET, FILM COATED ORAL at 21:25

## 2024-02-06 RX ADMIN — Medication 650 MILLIGRAM(S): at 11:09

## 2024-02-06 RX ADMIN — Medication 6 MILLIGRAM(S): at 05:23

## 2024-02-06 RX ADMIN — Medication 650 MILLIGRAM(S): at 11:20

## 2024-02-06 RX ADMIN — ENTACAPONE 200 MILLIGRAM(S): 200 TABLET, FILM COATED ORAL at 05:23

## 2024-02-06 RX ADMIN — METHOCARBAMOL 750 MILLIGRAM(S): 500 TABLET, FILM COATED ORAL at 21:25

## 2024-02-06 RX ADMIN — LEVETIRACETAM 500 MILLIGRAM(S): 250 TABLET, FILM COATED ORAL at 05:23

## 2024-02-06 NOTE — CONSULT NOTE ADULT - ASSESSMENT
Patient is a 74 y/o male with PMH of Parkinson's disease (diagnosed  7 years ago, HTN, 2 x CVA (1998, 2016), DM, Seizure (2019) on keppra presenting to the ED after 2 x falls. CT abdomen showed: 2 rounded foci in the transverse colon. Differential includes adherent stool versus colonic lesions. Patient is admitted to the floor for fall and COVID. GI is consulted for evaluation.     Abnormal CT scan:  Hemodynamically stable  Blood work reviewed  CT scan reviewed  Reported colonoscopy 5 to 10 years ago  Endorses weight loss    Recommendation  Advised colonoscopy that can be done on elective basis when acute medical issue resolves  Will coordinate with the patient set up outpatient colonoscopy with Dr. Nelson

## 2024-02-06 NOTE — PROGRESS NOTE ADULT - ASSESSMENT
Patient is a 72 y/o man with PMHx of Parkinson's disease (diagnosed 7 years ago, HTN, 2 x CVA (1998, 2016), DM, Seizure (2019) on keppra presenting to the ED after 2 x falls. (of note, patient was admitted for same complaints in 09/22/2022)    # COVID infection; thrombocytopenia  - Quarantine 1/31-2/9  - Not on oxygen  - RDV 5-day course last dose 2/6  - Likely the cause of fatigue  - CT chest 1/31 w/ IV: 1.8 cm left apical lung mass, suspicious for malignancy-new from neck CT of 6/1/2023 were the lung apices were imaged. Recommend oncologic evaluation. Pulm 2/1 --> outpatient PET/CT and PFTs.  Platelets improving  Dexa 6mg po q24 x up to10 days (2/2-2/11) plan to reduce since paper is on RA, clinically improving    # Fall 2/2 suspected akinesia from Parkinson's disease; Hx of Seizure (2019); hx vertigo  - orthostatic hypotension neg  - no syncope; no vertigo   - seen by ENT 06/2023: MR head negative, orthostatic vital signs negative (06/2023)  - Trauma w/u negative for acute traumatic injury.   - neuro feels he is having morning freeze episodes 2/2 wearing off of sinemet because of lack of long-acting version at bedtime  - sinemet 25/100 2 tab at 7am, 12 pm and 5pm  - sinemet ER 50/200 1 tab HS (10pm) - this is helping  - c/w entacapone, rivastigmine    - PT eval - walked 40' w/ asst; pt walked slowly and gets tired easily  - neuro requesting pt go for rehab at [facility]: [phone]  - fall precautions  - c/w meclizine 12.5 q12  - Family is looking for new neurologist, can provide information to movement specialist - Dr. Nicholas or Dr. Granica after discharge    # Hx of CVA (1996, 2016)   - CT H noted: stable mod chr microvasc changes and chr lacunar infarcts  - c/w asa 81 q24  - not on statin - LDL 80    # hx Sz  - on keppra 500mg BID    # lumbar back pain after fall - improving  no trauma seen on CT A/P  no further imaging needed for now  no further w/u  PT eval 2/2 --> home PT with RW  lido patch top q24  tyl 975mg po q8 standing  robaxin 750mg po q6  tramadol 50mg po q8 prn (do not raise dose)    # hypomagnesemia  - Replete PRN    # HTN  c/w carvedilol 12.5 q12  c/w norvasc 5mg po q24    # hx gout - pt says this has not been too bad recently  c/w allopurinol 200mg po q24  uric acid 4.9    # Insomnia   - Neurology recommends using zaleplon due to reduced risk of fall    # Apical lung mass- incidental finding  CT chest (01/31/2024): 1.8 cm left apical lung mass, suspicious for malignancy-new from neck CT of 6/1/2023 were the lung apices were imaged. Indeterminate right pulmonary nodular opacities.   Pulm consult 2/1 --> outpatient PET/CT and PFTs    # Colonic lesion vs adherent stool  - GI consult requested on 2/5 per attending  - 2/6 fellow note states will coordinate outpatient colonoscopy with Dr. Nelson.    # hx of obesity; s/p lap band in past, which became infected at one point and needed surgery to fix it    # asymp GB stones  no further intervention    # rt renal calc and 8mm bladder calc; rt renal cyst; hx urine incontinence   outpt uro eval    # GI prophylaxis: None    # DVT prophylaxis: Lovenox 40 q24    # Activity: need PT for back pain; WBAT;     # Code Status: Full code    # Shanon ELLSWORTH ([phone]):     Dispo: PT prn;  tx back pain; tx COVID; quarantine to 2/9;   eventually, pt will need STR - we prefer [facility]. Per CM, patient can get accepted on 2/12  outpt pulm f/u and PET scan; outpt uro f/u re stones; GI eval for c-scope; outpt neuro f/u re PD; outpt IM f/u

## 2024-02-06 NOTE — PROGRESS NOTE ADULT - SUBJECTIVE AND OBJECTIVE BOX
24H events:    Patient is a 73y old Male who presents with a chief complaint of Fall (06 Feb 2024 10:57)    Primary diagnosis of Fall    Today is hospital day 6d. This morning patient was seen and examined at bedside, resting comfortably in bed.    No acute or major events overnight.    The patient states that he feels "bright" today. States that he feels better in all ways except for feeling weak. States his cough and throat discomfort have improved. Denies any CP or SOB. States his appetite is low.    Code Status:  Full      PAST MEDICAL & SURGICAL HISTORY  Parkinson disease    CVA (cerebral vascular accident)    HTN (hypertension)    Diabetes mellitus    Gout    History of appendectomy      SOCIAL HISTORY:  Social History:  Denies EtOH, tobacco, illicit drug use. (31 Jan 2024 20:52)      ALLERGIES:  sulfa drugs (Unknown)  penicillin (Unknown)  cefaclor (Unknown)    MEDICATIONS:  STANDING MEDICATIONS  acetaminophen     Tablet .. 650 milliGRAM(s) Oral every 6 hours  allopurinol 200 milliGRAM(s) Oral daily  amLODIPine   Tablet 5 milliGRAM(s) Oral daily  aspirin  chewable 81 milliGRAM(s) Oral daily  carbidopa/levodopa  25/100 2 Tablet(s) Oral <User Schedule>  carbidopa/levodopa CR 50/200 1 Tablet(s) Oral <User Schedule>  chlorhexidine 2% Cloths 1 Application(s) Topical daily  dexAMETHasone     Tablet 6 milliGRAM(s) Oral daily  enoxaparin Injectable 40 milliGRAM(s) SubCutaneous every 24 hours  entacapone 200 milliGRAM(s) Oral three times a day  lactated ringers. 1000 milliLiter(s) IV Continuous <Continuous>  levETIRAcetam 500 milliGRAM(s) Oral two times a day  lidocaine   4% Patch 1 Patch Transdermal daily  meclizine 12.5 milliGRAM(s) Oral two times a day  methocarbamol 750 milliGRAM(s) Oral every 8 hours  polyethylene glycol 3350 17 Gram(s) Oral every 24 hours  rivastigmine patch  9.5 mG/24 Hr(s) 1 Patch Transdermal every 24 hours  senna 2 Tablet(s) Oral at bedtime    PRN MEDICATIONS  traMADol 50 milliGRAM(s) Oral every 8 hours PRN  zaleplon 5 milliGRAM(s) Oral at bedtime PRN    VITALS:   T(F): 97.7  HR: 57  BP: 163/84  RR: 18  SpO2: 95%    PHYSICAL EXAM:  GENERAL:   ( X ) NAD, lying in bed comfortably     (  ) obtunded     (  ) lethargic     (  ) somnolent    HEAD:   ( X ) Atraumatic     (  ) hematoma     (  ) laceration (specify location:       )     NECK:  ( X ) Supple     (  ) neck stiffness     (  ) nuchal rigidity     (  )  no JVD     (  ) JVD present ( -- cm)    HEART:  Rate -->     ( X ) normal rate     (  ) bradycardic     (  ) tachycardic  Rhythm -->     ( X ) regular     (  ) regularly irregular     (  ) irregularly irregular  Murmurs -->     ( X ) normal s1s2     (  ) systolic murmur     (  ) diastolic murmur     (  ) continuous murmur      (  ) S3 present     (  ) S4 present    LUNGS:   ( X ) Unlabored respirations     (  ) tachypnea  ( X ) B/L air entry     (  ) decreased breath sounds in:  (location     )    ( X ) no adventitious sound     (  ) crackles     (  ) wheezing      (  ) rhonchi      (specify location:       )  (  ) chest wall tenderness (specify location:       )    ABDOMEN:   ( X ) Soft     (  ) tense   |   ( X ) nondistended     (  ) distended   |   (  ) +BS     (  ) hypoactive bowel sounds     (  ) hyperactive bowel sounds  ( X ) nontender     (  ) RUQ tenderness     (  ) RLQ tenderness     (  ) LLQ tenderness     (  ) epigastric tenderness     (  ) diffuse tenderness  (  ) Splenomegaly      (  ) Hepatomegaly      (  ) Jaundice     (  ) ecchymosis     EXTREMITIES:  ( X ) Normal     (  ) Rash     (  ) ecchymosis     (  ) varicose veins      (  ) pitting edema     (  ) non-pitting edema   (  ) ulceration     (  ) gangrene:     (location:     )    NERVOUS SYSTEM:    ( X ) A&Ox3     (  ) confused     (  ) lethargic  CN II-XII:     ( X ) Intact     (  ) deficits found     (Specify:     )   Upper extremities:     ( X ) no sensorimotor deficits     (  ) weakness     (  ) loss of proprioception/vibration     (  ) loss of touch/temperature (specify:    )  Lower extremities:     ( X ) no sensorimotor deficits     (  ) weakness     (  ) loss of proprioception/vibration     (  ) loss of touch/temperature (specify:    )    LABS:                        13.9   10.05 )-----------( 119      ( 06 Feb 2024 06:44 )             39.1     02-06    142  |  109  |  20  ----------------------------<  130<H>  4.6   |  23  |  0.9    Ca    8.1<L>      06 Feb 2024 06:44  Mg     1.8     02-05    TPro  5.4<L>  /  Alb  3.0<L>  /  TBili  0.4  /  DBili  <0.2  /  AST  25  /  ALT  <5  /  AlkPhos  75  02-06

## 2024-02-06 NOTE — CONSULT NOTE ADULT - ATTENDING COMMENTS
IMPRESSIONS:     1.8 cm Left Apical Lung Mass   Possible transverse colon lesions  HO Parkinson's Disease   HO HTN   HO DM   HO CVA   COVID (+)     Impression and plan above have been altered and are my own.
Admitted SP fall and COVID, CT showed a possible focus in the colon. Will plan OP colonosocpy.
Recommendations as above

## 2024-02-06 NOTE — CONSULT NOTE ADULT - TIME BILLING
I have personally seen and examined this patient.  I have reviewed all pertinent clinical information and reviewed all relevant imaging and diagnostic studies personally.   If possible, I counseled the patient about diagnostic testing and treatment plan.   I discussed my recommendations with the primary team.
75 minutes spent on total encounter; more than 50% of the visit was spent counseling and / or coordinating care by the attending physician.  The necessity of the time spent during the encounter on this date of service was due to: Coordination of care.

## 2024-02-06 NOTE — PROGRESS NOTE ADULT - ASSESSMENT
Patient is a 74 y/o man with PMHx of Parkinson's disease (diagnosed 7 years ago, HTN, 2 x CVA (1998, 2016), DM, Seizure (2019) on keppra presenting to the ED after 2 x falls. 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 He has a brilliant mind. He has never smoked, drank or used and drugs in his entire life. He has lead a truly remarkable life. Pt was on [place] in [facility] and fell on stage at end of show and needed asst getting up. He then flew to [place], but could not get out of plane. Pt was hospitalized and this was the begin of his Parkinson's.    # COVID infection; thrombocytopenia  quarantine 1/31-2/9  had symp w/ fever and RA down to 88% at times; remains off O2 and RA sat 97%  gen fatigue and tiredness likely due ot covid   CXR unremarkable  ID consult noted, follow recommendations   plt are usually nl (as of 6/2023) -> plt should get better on their own; ok to use DVT ppx  RDV x5 days (2/2-2/6)  Dexa 6mg po q24 x up to10 days (2/2-2/11) as per ID   last dose of remdesevir today. now much better. need to wait till 2/11 for quarantine as per CM     # Fall 2/2 suspected akinesia from Parkinson's disease; Hx of Seizure (2019); hx vertigo  orthostatic hypotension neg  no syncope; no vertigo   seen by ENT 06/2023: MR head negative, orthostatic vital signs negative (06/2023)  Trauma w/u negative for acute traumatic injury.   neuro feels he is having morning freeze episodes 2/2 wearing off of sinemet because of lack of long-acting version at bedtime  make sinemet 25/100 2 tab at 7am, 12 pm and 5pm  make sinemet ER 50/200 1 tab HS (10pm) - this is helping  c/w entacapone, rivastigmine    PT eval - walked 40' w/ asst; pt walked slowly and gets tired easily  neuro requesting pt go for rehab at [facility]: [phone]  fall precautions  c/w meclizine 12.5 q12    # Hx of CVA (1996, 2016)   CT H noted: stable mod chr microvasc changes and chr lacunar infarcts  c/w asa 81 q24  not on statin - LDL 80    # hx Sz  on keppra 500mg BID    # lumbar back pain after fall  no trauma seen on CT A/P  PT eval  lido patch top q24  tyl 975mg po q8 standing  decr robaxin 750mg po q8  tramadol 50mg po q8 prn     # hypomagnesemia - better    # HTN  c/w carvedilol 12.5 q12  c/w norvasc 5mg po q24    # hx gout - pt says this has not been too bad recently  c/w allopurinol 200mg po q24  uric acid 4.9    # Insomnia   c/w Lunesta (use ambien here)    # Apical lung mass- incidental finding  CT chest (01/31/2024): 1.8 cm left apical lung mass, suspicious for malignancy-new from neck CT of 6/1/2023 were the lung apices were imaged. Indeterminate right pulmonary nodular opacities.   Pulm consult: noted,. OP follow up, pet scan and biopsy    # Colonic lesion vs adherent stool  there is no rush to eval lesions that could simply be stool  inpatient vs outpt GI consult for colonoscopy   follow GI. if no plan for inpatient colonoscopy need to follow up with GI OP   patient is aware of OP pulmonology and GI follow up     # hx of obesity; s/p lap band in past, which became infected at one point and needed surgery to fix it    # asymp GB stones  no further intervention    # rt renal calc and 8mm bladder calc; rt renal cyst; hx urine incontinence   outpt uro eval  asymptomatic     # GI prophylaxis: None    # DVT prophylaxis: lovenox 40 q24    # Activity: PT; amb w/ asst; use walker     # Code Status: Full code    # Shanon ELLSWORTH ([phone]):     Dispo: PT prn; tx back pain; tx COVID; quarantine to 2/11 (as per CM);   eventually, pt will need STR - we prefer [facility] - f/u CM -   outpt pulm f/u and PET scan; outpt uro f/u re stones; outpt GI eval for c-scope (if no inpatinet plan); outpt neuro f/u re PD; outpt IM f/u  'patient is aware of lung lesion and colonic lesions,

## 2024-02-06 NOTE — CONSULT NOTE ADULT - SUBJECTIVE AND OBJECTIVE BOX
Gastroenterology Consultation:    Patient is a 73y old  Male who presents with a chief complaint of Fall (05 Feb 2024 16:30)        Admitted on: 01-31-24      HPI:  Patient is a 72 y/o male with PMH of Parkinson's disease (diagnosed  7 years ago, HTN, 2 x CVA (1998, 2016), DM, Seizure (2019) on keppra presenting to the ED after 2 x falls. (of note, patient was admitted for same complaints in 09/22/2022)    Patient states that he woke up around 0500 went to the bathroom and fell in the kitchen. He was unable to recall if he lost consciousness but was able to get back up and go back to the bedroom. When getting back up from bed, he fell again and was not able to get up without assistance. He cannot recall if he felt dizzy or lost consciousness He states that he was unable to move his legs. He endorses some mid-back tenderness attributed to the fall and tremor of his left hand attributed to parkinson's. He also endorses constant mild vertigo has improved since last admission in 06/2023.     Otherwise, he denies fevers, chills, SOB, cough, sputum production, night sweats, headaches, abdominal pain, diarrhea, dysuria, LE edema.     In the ED, patient tested positive for COVID. BP was 160s/50s, otherwise, vital signs were within normal limits. CBC, CMP, PT INR was unremarkable.     Trauma w/u was negative for acute traumatic injury. (CT chest, CT abdomen/pelvis, CT c-spine, CT head, xray pelvis, xray chest)    CT chest showed incidental finding of 1.8 cm left apical lung mass, suspicious for malignancy-new from neck CT of 6/1/2023.    CT abdomen showed: 2 rounded foci in the transverse colon. Differential includes adherent stool versus colonic lesions.     Patient is admitted to the floor for fall. (31 Jan 2024 20:52)        Prior EGD:    Prior Colonoscopy:      PAST MEDICAL & SURGICAL HISTORY:  Parkinson disease      CVA (cerebral vascular accident)      HTN (hypertension)      Diabetes mellitus      Gout      History of appendectomy            FAMILY HISTORY:  FH: hypertension    FH: CAD (coronary artery disease)        Social History:  Tobacco:  Alcohol:  Drugs:    Home Medications:  aspirin 81 mg oral delayed release tablet: 1 tab(s) orally once a day (31 Jan 2024 22:27)  carbidopa-levodopa 25 mg-100 mg oral tablet: 2 tab(s) orally 3 times a day (31 Jan 2024 22:27)  entacapone 200 mg oral tablet: 1 tab(s) orally 4 times a day with sinemet (31 Jan 2024 22:27)  levETIRAcetam 500 mg oral tablet: 1 tab(s) orally 2 times a day (31 Jan 2024 22:27)  Lunesta 1 mg oral tablet: 1 tab(s) orally once a day (at bedtime) (31 Jan 2024 22:27)  meclizine 12.5 mg oral tablet: 1 tab(s) orally 2 times a day (31 Jan 2024 22:27)  rivastigmine 9.5 mg/24 hr transdermal film, extended release: 1 patch transdermal every 24 hours (31 Jan 2024 22:27)  traZODone 100 mg oral tablet: 1 tab(s) orally once a day (31 Jan 2024 22:27)        MEDICATIONS  (STANDING):  acetaminophen     Tablet .. 650 milliGRAM(s) Oral every 6 hours  allopurinol 200 milliGRAM(s) Oral daily  amLODIPine   Tablet 5 milliGRAM(s) Oral daily  aspirin  chewable 81 milliGRAM(s) Oral daily  carbidopa/levodopa  25/100 2 Tablet(s) Oral <User Schedule>  carbidopa/levodopa CR 50/200 1 Tablet(s) Oral <User Schedule>  carvedilol 12.5 milliGRAM(s) Oral every 12 hours  chlorhexidine 2% Cloths 1 Application(s) Topical daily  dexAMETHasone     Tablet 6 milliGRAM(s) Oral daily  enoxaparin Injectable 40 milliGRAM(s) SubCutaneous every 24 hours  entacapone 200 milliGRAM(s) Oral three times a day  lactated ringers. 1000 milliLiter(s) (100 mL/Hr) IV Continuous <Continuous>  levETIRAcetam 500 milliGRAM(s) Oral two times a day  lidocaine   4% Patch 1 Patch Transdermal daily  meclizine 12.5 milliGRAM(s) Oral two times a day  methocarbamol 750 milliGRAM(s) Oral every 8 hours  polyethylene glycol 3350 17 Gram(s) Oral every 24 hours  remdesivir  IVPB 100 milliGRAM(s) IV Intermittent every 24 hours  remdesivir  IVPB   IV Intermittent   rivastigmine patch  9.5 mG/24 Hr(s) 1 Patch Transdermal every 24 hours  senna 2 Tablet(s) Oral at bedtime    MEDICATIONS  (PRN):  traMADol 50 milliGRAM(s) Oral every 8 hours PRN Moderate Pain (4 - 6)  zaleplon 5 milliGRAM(s) Oral at bedtime PRN Insomnia      Allergies  sulfa drugs (Unknown)  penicillin (Unknown)  cefaclor (Unknown)      Review of Systems:   Constitutional:  No Fever, No Chills  ENT/Mouth:  No Hearing Changes,  No Difficulty Swallowing  Eyes:  No Eye Pain, No Vision Changes  Cardiovascular:  No Chest Pain, No Palpitations  Respiratory:  No Cough, No Dyspnea  Gastrointestinal:  As described in HPI  Musculoskeletal:  No Joint Swelling, No Back Pain  Skin:  No Skin Lesions, No Jaundice  Neuro:  No Syncope, No Dizziness  Heme/Lymph:  No Bruising, No Bleeding.          Physical Examination:  T(C): 36.1 (02-06-24 @ 04:45), Max: 37 (02-05-24 @ 13:46)  HR: 47 (02-06-24 @ 07:36) (43 - 57)  BP: 138/71 (02-06-24 @ 04:45) (114/59 - 144/74)  RR: 18 (02-06-24 @ 07:36) (18 - 18)  SpO2: 95% (02-06-24 @ 07:36) (95% - 95%)          GENERAL: AAOx3, no acute distress.  HEAD:  Atraumatic, Normocephalic  EYES: conjunctiva and sclera clear  NECK: Supple, no JVD or thyromegaly  CHEST/LUNG: Clear to auscultation bilaterally; No wheeze, rhonchi, or rales  HEART: Regular rate and rhythm; normal S1, S2, No murmurs.  ABDOMEN: Soft, nontender, nondistended; Bowel sounds present  NEUROLOGY: No asterixis or tremor.   SKIN: Intact, no jaundice        Data:                        13.9   10.05 )-----------( 119      ( 06 Feb 2024 06:44 )             39.1     Hgb Trend:  13.9  02-06-24 @ 06:44  14.0  02-05-24 @ 19:01  13.7  02-05-24 @ 07:41  15.4  02-04-24 @ 04:30      02-06    142  |  109  |  20  ----------------------------<  130<H>  4.6   |  23  |  0.9    Ca    8.1<L>      06 Feb 2024 06:44  Mg     1.8     02-05    TPro  5.4<L>  /  Alb  3.0<L>  /  TBili  0.4  /  DBili  <0.2  /  AST  25  /  ALT  <5  /  AlkPhos  75  02-06    Liver panel trend:  TBili 0.4   /   AST 25   /   ALT <5   /   AlkP 75   /   Tptn 5.4   /   Alb 3.0    /   DBili <0.2      02-06  TBili 0.5   /   AST 21   /   ALT <5   /   AlkP 68   /   Tptn 5.2   /   Alb 3.1    /   DBili <0.2      02-05  TBili 0.8   /   AST 20   /   ALT <5   /   AlkP 69   /   Tptn 6.2   /   Alb 3.4    /   DBili 0.2      02-04  TBili 1.0   /   AST 22   /   ALT <5   /   AlkP 66   /   Tptn 5.7   /   Alb 3.2    /   DBili 0.3      02-03  TBili 1.1   /   AST 28   /   ALT 8   /   AlkP 75   /   Tptn 6.3   /   Alb 3.5    /   DBili 0.3      02-02  TBili 1.1   /   AST 26   /   ALT <5   /   AlkP 76   /   Tptn 6.4   /   Alb 3.6    /   DBili --      02-02  TBili 0.9   /   AST 22   /   ALT <5   /   AlkP 73   /   Tptn 6.1   /   Alb 3.5    /   DBili --      02-01  TBili 1.0   /   AST 17   /   ALT <5   /   AlkP 77   /   Tptn 6.1   /   Alb 3.6    /   DBili --      01-31              Radiology:       Gastroenterology Consultation:    Patient is a 73y old  Male who presents with a chief complaint of Fall (05 Feb 2024 16:30)        Admitted on: 01-31-24      HPI:  Patient is a 74 y/o male with PMH of Parkinson's disease (diagnosed  7 years ago, HTN, 2 x CVA (1998, 2016), DM, Seizure (2019) on keppra presenting to the ED after 2 x falls. Patient states that he woke up around 0500 went to the bathroom and fell in the kitchen. He was unable to recall if he lost consciousness but was able to get back up and go back to the bedroom. When getting back up from bed, he fell again and was not able to get up without assistance. He cannot recall if he felt dizzy or lost consciousness He states that he was unable to move his legs. He endorses some mid-back tenderness attributed to the fall and tremor of his left hand attributed to parkinson's. He also endorses constant mild vertigo has improved since last admission in 06/2023.  Otherwise, he denies fevers, chills, SOB, cough, sputum production, night sweats, headaches, abdominal pain, diarrhea, dysuria, LE edema.  In the ED, patient tested positive for COVID. BP was 160s/50s, otherwise, vital signs were within normal limits. CBC, CMP, PT INR was unremarkable.   Trauma w/u was negative for acute traumatic injury. (CT chest, CT abdomen/pelvis, CT c-spine, CT head, xray pelvis, xray chest). CT chest showed incidental finding of 1.8 cm left apical lung mass, suspicious for malignancy-new from neck CT of 6/1/2023. CT abdomen showed: 2 rounded foci in the transverse colon. Differential includes adherent stool versus colonic lesions. Patient is admitted to the floor for fall and COVID. GI is consulted for evaluation. Patient reports a prior colonoscopy done 5 to 10 years ago that was remarkable.  Patient no reports available             PAST MEDICAL & SURGICAL HISTORY:  Parkinson disease      CVA (cerebral vascular accident)      HTN (hypertension)      Diabetes mellitus      Gout      History of appendectomy            FAMILY HISTORY:  FH: hypertension    FH: CAD (coronary artery disease)        Social History:   Tobacco: denies  Alcohol: denies  Drugs: denies     Home Medications:  aspirin 81 mg oral delayed release tablet: 1 tab(s) orally once a day (31 Jan 2024 22:27)  carbidopa-levodopa 25 mg-100 mg oral tablet: 2 tab(s) orally 3 times a day (31 Jan 2024 22:27)  entacapone 200 mg oral tablet: 1 tab(s) orally 4 times a day with sinemet (31 Jan 2024 22:27)  levETIRAcetam 500 mg oral tablet: 1 tab(s) orally 2 times a day (31 Jan 2024 22:27)  Lunesta 1 mg oral tablet: 1 tab(s) orally once a day (at bedtime) (31 Jan 2024 22:27)  meclizine 12.5 mg oral tablet: 1 tab(s) orally 2 times a day (31 Jan 2024 22:27)  rivastigmine 9.5 mg/24 hr transdermal film, extended release: 1 patch transdermal every 24 hours (31 Jan 2024 22:27)  traZODone 100 mg oral tablet: 1 tab(s) orally once a day (31 Jan 2024 22:27)        MEDICATIONS  (STANDING):  acetaminophen     Tablet .. 650 milliGRAM(s) Oral every 6 hours  allopurinol 200 milliGRAM(s) Oral daily  amLODIPine   Tablet 5 milliGRAM(s) Oral daily  aspirin  chewable 81 milliGRAM(s) Oral daily  carbidopa/levodopa  25/100 2 Tablet(s) Oral <User Schedule>  carbidopa/levodopa CR 50/200 1 Tablet(s) Oral <User Schedule>  carvedilol 12.5 milliGRAM(s) Oral every 12 hours  chlorhexidine 2% Cloths 1 Application(s) Topical daily  dexAMETHasone     Tablet 6 milliGRAM(s) Oral daily  enoxaparin Injectable 40 milliGRAM(s) SubCutaneous every 24 hours  entacapone 200 milliGRAM(s) Oral three times a day  lactated ringers. 1000 milliLiter(s) (100 mL/Hr) IV Continuous <Continuous>  levETIRAcetam 500 milliGRAM(s) Oral two times a day  lidocaine   4% Patch 1 Patch Transdermal daily  meclizine 12.5 milliGRAM(s) Oral two times a day  methocarbamol 750 milliGRAM(s) Oral every 8 hours  polyethylene glycol 3350 17 Gram(s) Oral every 24 hours  remdesivir  IVPB 100 milliGRAM(s) IV Intermittent every 24 hours  remdesivir  IVPB   IV Intermittent   rivastigmine patch  9.5 mG/24 Hr(s) 1 Patch Transdermal every 24 hours  senna 2 Tablet(s) Oral at bedtime    MEDICATIONS  (PRN):  traMADol 50 milliGRAM(s) Oral every 8 hours PRN Moderate Pain (4 - 6)  zaleplon 5 milliGRAM(s) Oral at bedtime PRN Insomnia      Allergies  sulfa drugs (Unknown)  penicillin (Unknown)  cefaclor (Unknown)      Review of Systems:   Constitutional:  No Fever, No Chills  ENT/Mouth:  No Hearing Changes,  No Difficulty Swallowing  Eyes:  No Eye Pain, No Vision Changes  Cardiovascular:  No Chest Pain, No Palpitations  Respiratory:  No Cough, No Dyspnea  Gastrointestinal:  As described in HPI  Musculoskeletal:  No Joint Swelling, No Back Pain  Skin:  No Skin Lesions, No Jaundice  Neuro:  No Syncope, No Dizziness  Heme/Lymph:  No Bruising, No Bleeding.          Physical Examination:  T(C): 36.1 (02-06-24 @ 04:45), Max: 37 (02-05-24 @ 13:46)  HR: 47 (02-06-24 @ 07:36) (43 - 57)  BP: 138/71 (02-06-24 @ 04:45) (114/59 - 144/74)  RR: 18 (02-06-24 @ 07:36) (18 - 18)  SpO2: 95% (02-06-24 @ 07:36) (95% - 95%)          GENERAL: AAOx3, no acute distress.  HEAD:  Atraumatic, Normocephalic  EYES: conjunctiva and sclera clear  NECK: Supple, no JVD or thyromegaly  CHEST/LUNG: Clear to auscultation bilaterally; No wheeze, rhonchi, or rales  HEART: Regular rate and rhythm; normal S1, S2, No murmurs.  ABDOMEN: Soft, nontender, nondistended; Bowel sounds present  NEUROLOGY: No asterixis or tremor.   SKIN: Intact, no jaundice        Data:                        13.9   10.05 )-----------( 119      ( 06 Feb 2024 06:44 )             39.1     Hgb Trend:  13.9  02-06-24 @ 06:44  14.0  02-05-24 @ 19:01  13.7  02-05-24 @ 07:41  15.4  02-04-24 @ 04:30      02-06    142  |  109  |  20  ----------------------------<  130<H>  4.6   |  23  |  0.9    Ca    8.1<L>      06 Feb 2024 06:44  Mg     1.8     02-05    TPro  5.4<L>  /  Alb  3.0<L>  /  TBili  0.4  /  DBili  <0.2  /  AST  25  /  ALT  <5  /  AlkPhos  75  02-06    Liver panel trend:  TBili 0.4   /   AST 25   /   ALT <5   /   AlkP 75   /   Tptn 5.4   /   Alb 3.0    /   DBili <0.2      02-06  TBili 0.5   /   AST 21   /   ALT <5   /   AlkP 68   /   Tptn 5.2   /   Alb 3.1    /   DBili <0.2      02-05  TBili 0.8   /   AST 20   /   ALT <5   /   AlkP 69   /   Tptn 6.2   /   Alb 3.4    /   DBili 0.2      02-04  TBili 1.0   /   AST 22   /   ALT <5   /   AlkP 66   /   Tptn 5.7   /   Alb 3.2    /   DBili 0.3      02-03  TBili 1.1   /   AST 28   /   ALT 8   /   AlkP 75   /   Tptn 6.3   /   Alb 3.5    /   DBili 0.3      02-02  TBili 1.1   /   AST 26   /   ALT <5   /   AlkP 76   /   Tptn 6.4   /   Alb 3.6    /   DBili --      02-02  TBili 0.9   /   AST 22   /   ALT <5   /   AlkP 73   /   Tptn 6.1   /   Alb 3.5    /   DBili --      02-01  TBili 1.0   /   AST 17   /   ALT <5   /   AlkP 77   /   Tptn 6.1   /   Alb 3.6    /   DBili --      01-31              Radiology:

## 2024-02-07 LAB
ALBUMIN SERPL ELPH-MCNC: 3.7 G/DL — SIGNIFICANT CHANGE UP (ref 3.5–5.2)
ALP SERPL-CCNC: 70 U/L — SIGNIFICANT CHANGE UP (ref 30–115)
ALT FLD-CCNC: 7 U/L — SIGNIFICANT CHANGE UP (ref 0–41)
ANION GAP SERPL CALC-SCNC: 12 MMOL/L — SIGNIFICANT CHANGE UP (ref 7–14)
AST SERPL-CCNC: 26 U/L — SIGNIFICANT CHANGE UP (ref 0–41)
BILIRUB DIRECT SERPL-MCNC: 0.2 MG/DL — SIGNIFICANT CHANGE UP (ref 0–0.3)
BILIRUB INDIRECT FLD-MCNC: 0.5 MG/DL — SIGNIFICANT CHANGE UP (ref 0.2–1.2)
BILIRUB SERPL-MCNC: 0.7 MG/DL — SIGNIFICANT CHANGE UP (ref 0.2–1.2)
BUN SERPL-MCNC: 16 MG/DL — SIGNIFICANT CHANGE UP (ref 10–20)
CALCIUM SERPL-MCNC: 8.5 MG/DL — SIGNIFICANT CHANGE UP (ref 8.4–10.5)
CHLORIDE SERPL-SCNC: 102 MMOL/L — SIGNIFICANT CHANGE UP (ref 98–110)
CO2 SERPL-SCNC: 26 MMOL/L — SIGNIFICANT CHANGE UP (ref 17–32)
CREAT SERPL-MCNC: 0.9 MG/DL — SIGNIFICANT CHANGE UP (ref 0.7–1.5)
CULTURE RESULTS: SIGNIFICANT CHANGE UP
EGFR: 91 ML/MIN/1.73M2 — SIGNIFICANT CHANGE UP
GLUCOSE SERPL-MCNC: 136 MG/DL — HIGH (ref 70–99)
HCT VFR BLD CALC: 44 % — SIGNIFICANT CHANGE UP (ref 42–52)
HGB BLD-MCNC: 15.7 G/DL — SIGNIFICANT CHANGE UP (ref 14–18)
MCHC RBC-ENTMCNC: 33.6 PG — HIGH (ref 27–31)
MCHC RBC-ENTMCNC: 35.7 G/DL — SIGNIFICANT CHANGE UP (ref 32–37)
MCV RBC AUTO: 94.2 FL — HIGH (ref 80–94)
NRBC # BLD: 0 /100 WBCS — SIGNIFICANT CHANGE UP (ref 0–0)
PLATELET # BLD AUTO: 158 K/UL — SIGNIFICANT CHANGE UP (ref 130–400)
PMV BLD: 10 FL — SIGNIFICANT CHANGE UP (ref 7.4–10.4)
POTASSIUM SERPL-MCNC: 4.1 MMOL/L — SIGNIFICANT CHANGE UP (ref 3.5–5)
POTASSIUM SERPL-SCNC: 4.1 MMOL/L — SIGNIFICANT CHANGE UP (ref 3.5–5)
PROT SERPL-MCNC: 6.2 G/DL — SIGNIFICANT CHANGE UP (ref 6–8)
RBC # BLD: 4.67 M/UL — LOW (ref 4.7–6.1)
RBC # FLD: 11.9 % — SIGNIFICANT CHANGE UP (ref 11.5–14.5)
SODIUM SERPL-SCNC: 140 MMOL/L — SIGNIFICANT CHANGE UP (ref 135–146)
SPECIMEN SOURCE: SIGNIFICANT CHANGE UP
WBC # BLD: 11.93 K/UL — HIGH (ref 4.8–10.8)
WBC # FLD AUTO: 11.93 K/UL — HIGH (ref 4.8–10.8)

## 2024-02-07 PROCEDURE — 99233 SBSQ HOSP IP/OBS HIGH 50: CPT

## 2024-02-07 PROCEDURE — 99232 SBSQ HOSP IP/OBS MODERATE 35: CPT

## 2024-02-07 RX ORDER — CARVEDILOL PHOSPHATE 80 MG/1
3.12 CAPSULE, EXTENDED RELEASE ORAL EVERY 12 HOURS
Refills: 0 | Status: DISCONTINUED | OUTPATIENT
Start: 2024-02-07 | End: 2024-02-12

## 2024-02-07 RX ORDER — LACTULOSE 10 G/15ML
20 SOLUTION ORAL
Refills: 0 | Status: DISCONTINUED | OUTPATIENT
Start: 2024-02-07 | End: 2024-02-12

## 2024-02-07 RX ORDER — CHLORHEXIDINE GLUCONATE 213 G/1000ML
1 SOLUTION TOPICAL DAILY
Refills: 0 | Status: DISCONTINUED | OUTPATIENT
Start: 2024-02-07 | End: 2024-02-08

## 2024-02-07 RX ADMIN — METHOCARBAMOL 750 MILLIGRAM(S): 500 TABLET, FILM COATED ORAL at 05:46

## 2024-02-07 RX ADMIN — Medication 12.5 MILLIGRAM(S): at 05:46

## 2024-02-07 RX ADMIN — CARBIDOPA AND LEVODOPA 2 TABLET(S): 25; 100 TABLET ORAL at 17:32

## 2024-02-07 RX ADMIN — POLYETHYLENE GLYCOL 3350 17 GRAM(S): 17 POWDER, FOR SOLUTION ORAL at 12:35

## 2024-02-07 RX ADMIN — Medication 6 MILLIGRAM(S): at 05:47

## 2024-02-07 RX ADMIN — Medication 650 MILLIGRAM(S): at 06:30

## 2024-02-07 RX ADMIN — Medication 200 MILLIGRAM(S): at 12:35

## 2024-02-07 RX ADMIN — LIDOCAINE 1 PATCH: 4 CREAM TOPICAL at 12:36

## 2024-02-07 RX ADMIN — Medication 12.5 MILLIGRAM(S): at 17:32

## 2024-02-07 RX ADMIN — RIVASTIGMINE 1 PATCH: 4.6 PATCH, EXTENDED RELEASE TRANSDERMAL at 23:26

## 2024-02-07 RX ADMIN — ENOXAPARIN SODIUM 40 MILLIGRAM(S): 100 INJECTION SUBCUTANEOUS at 05:47

## 2024-02-07 RX ADMIN — Medication 650 MILLIGRAM(S): at 05:47

## 2024-02-07 RX ADMIN — Medication 650 MILLIGRAM(S): at 17:32

## 2024-02-07 RX ADMIN — CHLORHEXIDINE GLUCONATE 1 APPLICATION(S): 213 SOLUTION TOPICAL at 12:37

## 2024-02-07 RX ADMIN — Medication 81 MILLIGRAM(S): at 12:35

## 2024-02-07 RX ADMIN — RIVASTIGMINE 1 PATCH: 4.6 PATCH, EXTENDED RELEASE TRANSDERMAL at 06:31

## 2024-02-07 RX ADMIN — LIDOCAINE 1 PATCH: 4 CREAM TOPICAL at 23:26

## 2024-02-07 RX ADMIN — ENTACAPONE 200 MILLIGRAM(S): 200 TABLET, FILM COATED ORAL at 21:34

## 2024-02-07 RX ADMIN — Medication 650 MILLIGRAM(S): at 21:34

## 2024-02-07 RX ADMIN — METHOCARBAMOL 750 MILLIGRAM(S): 500 TABLET, FILM COATED ORAL at 21:34

## 2024-02-07 RX ADMIN — Medication 650 MILLIGRAM(S): at 17:34

## 2024-02-07 RX ADMIN — CARBIDOPA AND LEVODOPA 1 TABLET(S): 25; 100 TABLET ORAL at 21:34

## 2024-02-07 RX ADMIN — RIVASTIGMINE 1 PATCH: 4.6 PATCH, EXTENDED RELEASE TRANSDERMAL at 13:03

## 2024-02-07 RX ADMIN — Medication 650 MILLIGRAM(S): at 12:34

## 2024-02-07 RX ADMIN — LEVETIRACETAM 500 MILLIGRAM(S): 250 TABLET, FILM COATED ORAL at 17:32

## 2024-02-07 RX ADMIN — LEVETIRACETAM 500 MILLIGRAM(S): 250 TABLET, FILM COATED ORAL at 05:46

## 2024-02-07 RX ADMIN — CARBIDOPA AND LEVODOPA 2 TABLET(S): 25; 100 TABLET ORAL at 05:49

## 2024-02-07 RX ADMIN — METHOCARBAMOL 750 MILLIGRAM(S): 500 TABLET, FILM COATED ORAL at 12:36

## 2024-02-07 RX ADMIN — RIVASTIGMINE 1 PATCH: 4.6 PATCH, EXTENDED RELEASE TRANSDERMAL at 12:35

## 2024-02-07 RX ADMIN — ENTACAPONE 200 MILLIGRAM(S): 200 TABLET, FILM COATED ORAL at 12:36

## 2024-02-07 RX ADMIN — CARVEDILOL PHOSPHATE 3.12 MILLIGRAM(S): 80 CAPSULE, EXTENDED RELEASE ORAL at 17:33

## 2024-02-07 RX ADMIN — Medication 650 MILLIGRAM(S): at 22:25

## 2024-02-07 RX ADMIN — CARBIDOPA AND LEVODOPA 2 TABLET(S): 25; 100 TABLET ORAL at 12:34

## 2024-02-07 RX ADMIN — AMLODIPINE BESYLATE 5 MILLIGRAM(S): 2.5 TABLET ORAL at 05:47

## 2024-02-07 RX ADMIN — Medication 650 MILLIGRAM(S): at 13:03

## 2024-02-07 RX ADMIN — CHLORHEXIDINE GLUCONATE 1 APPLICATION(S): 213 SOLUTION TOPICAL at 17:34

## 2024-02-07 RX ADMIN — SENNA PLUS 2 TABLET(S): 8.6 TABLET ORAL at 21:35

## 2024-02-07 RX ADMIN — ENTACAPONE 200 MILLIGRAM(S): 200 TABLET, FILM COATED ORAL at 05:47

## 2024-02-07 NOTE — PROGRESS NOTE ADULT - SUBJECTIVE AND OBJECTIVE BOX
24H events:    Patient is a 73y old Male who presents with a chief complaint of Fall (06 Feb 2024 16:52)    Primary diagnosis of Fall    Today is hospital day 7d. This morning patient was seen and examined at bedside, resting comfortably in bed.    No acute or major events overnight.    The patient states that he feels better again today (reports daily improvement in how he feels). He is inquisitive about his lung mass and the colonic masses. He also wonders about why he still feels a bit dizzy, and as if some images are lagging behind when he turns his head. Other than this he reports no new complaints, denies CP, SOB.    Code Status:  Full      PAST MEDICAL & SURGICAL HISTORY  Parkinson disease    CVA (cerebral vascular accident)    HTN (hypertension)    Diabetes mellitus    Gout    History of appendectomy      SOCIAL HISTORY:  Social History:  Denies EtOH, tobacco, illicit drug use. (31 Jan 2024 20:52)      ALLERGIES:  sulfa drugs (Unknown)  penicillin (Unknown)  cefaclor (Unknown)    MEDICATIONS:  STANDING MEDICATIONS  acetaminophen     Tablet .. 650 milliGRAM(s) Oral every 6 hours  allopurinol 200 milliGRAM(s) Oral daily  amLODIPine   Tablet 5 milliGRAM(s) Oral daily  aspirin  chewable 81 milliGRAM(s) Oral daily  carbidopa/levodopa  25/100 2 Tablet(s) Oral <User Schedule>  carbidopa/levodopa CR 50/200 1 Tablet(s) Oral <User Schedule>  carvedilol 3.125 milliGRAM(s) Oral every 12 hours  chlorhexidine 2% Cloths 1 Application(s) Topical daily  chlorhexidine 2% Cloths 1 Application(s) Topical daily  enoxaparin Injectable 40 milliGRAM(s) SubCutaneous every 24 hours  entacapone 200 milliGRAM(s) Oral three times a day  lactated ringers. 1000 milliLiter(s) IV Continuous <Continuous>  levETIRAcetam 500 milliGRAM(s) Oral two times a day  lidocaine   4% Patch 1 Patch Transdermal daily  meclizine 12.5 milliGRAM(s) Oral two times a day  methocarbamol 750 milliGRAM(s) Oral every 8 hours  polyethylene glycol 3350 17 Gram(s) Oral every 24 hours  rivastigmine patch  9.5 mG/24 Hr(s) 1 Patch Transdermal every 24 hours  senna 2 Tablet(s) Oral at bedtime    PRN MEDICATIONS  lactulose Syrup 20 Gram(s) Oral every 2 hours PRN  traMADol 50 milliGRAM(s) Oral every 8 hours PRN  zaleplon 5 milliGRAM(s) Oral at bedtime PRN    VITALS:   T(F): 97.4  HR: 60  BP: 170/76  RR: 18  SpO2: --    PHYSICAL EXAM:  GENERAL:   ( X ) NAD, lying in bed comfortably     (  ) obtunded     (  ) lethargic     (  ) somnolent    HEAD:   ( X ) Atraumatic     (  ) hematoma     (  ) laceration (specify location:       )     NECK:  ( X ) Supple     (  ) neck stiffness     (  ) nuchal rigidity     (  )  no JVD     (  ) JVD present ( -- cm)    HEART:  Rate -->     ( X ) normal rate     (  ) bradycardic     (  ) tachycardic  Rhythm -->     ( X ) regular     (  ) regularly irregular     (  ) irregularly irregular  Murmurs -->     ( X ) normal s1s2     (  ) systolic murmur     (  ) diastolic murmur     (  ) continuous murmur      (  ) S3 present     (  ) S4 present    LUNGS:   ( X ) Unlabored respirations     (  ) tachypnea  ( X ) B/L air entry     (  ) decreased breath sounds in:  (location     )    ( X ) no adventitious sound     (  ) crackles     (  ) wheezing      (  ) rhonchi      (specify location:       )  (  ) chest wall tenderness (specify location:       )    ABDOMEN:   ( X ) Soft     (  ) tense   |   ( X ) nondistended     (  ) distended   |   (  ) +BS     (  ) hypoactive bowel sounds     (  ) hyperactive bowel sounds  ( X ) nontender     (  ) RUQ tenderness     (  ) RLQ tenderness     (  ) LLQ tenderness     (  ) epigastric tenderness     (  ) diffuse tenderness  (  ) Splenomegaly      (  ) Hepatomegaly      (  ) Jaundice     (  ) ecchymosis     EXTREMITIES:  ( X ) Normal     (  ) Rash     (  ) ecchymosis     (  ) varicose veins      (  ) pitting edema     (  ) non-pitting edema   (  ) ulceration     (  ) gangrene:     (location:     )    NERVOUS SYSTEM:    ( X ) A&Ox3     (  ) confused     (  ) lethargic  CN II-XII:     ( X ) Intact     (  ) deficits found     (Specify:     )   Upper extremities:     ( X ) no sensorimotor deficits     (  ) weakness     (  ) loss of proprioception/vibration     (  ) loss of touch/temperature (specify:    )  Lower extremities:     ( X ) no sensorimotor deficits     (  ) weakness     (  ) loss of proprioception/vibration     (  ) loss of touch/temperature (specify:    )    LABS:                        15.7   11.93 )-----------( 158      ( 07 Feb 2024 08:10 )             44.0     02-07    140  |  102  |  16  ----------------------------<  136<H>  4.1   |  26  |  0.9    Ca    8.5      07 Feb 2024 08:10    TPro  6.2  /  Alb  3.7  /  TBili  0.7  /  DBili  0.2  /  AST  26  /  ALT  7   /  AlkPhos  70  02-07

## 2024-02-07 NOTE — PROGRESS NOTE ADULT - SUBJECTIVE AND OBJECTIVE BOX
ZAKIA MILLS  73y  Male  ***My note supersedes ALL resident notes that I sign.  My corrections for their notes are in my note.***    I can be reached directly on [employer]. My office number is [phone]. My personal cell number is [phone].    INTERVAL EVENTS: Here for f/u of COVID. Pt improving each day. Pt more mobile now. Eats fine.     T(F): 97.4 (02-07-24 @ 12:39), Max: 97.4 (02-07-24 @ 12:39)  HR: 60 (02-07-24 @ 12:39) (50 - 60)  BP: 170/76 (02-07-24 @ 12:39) (140/75 - 170/76)  RR: 18 (02-07-24 @ 12:39) (18 - 18)  SpO2: --    Gen: NAD; somewhat flat affect; tired  HEENT: PERRL, EOMI, mouth clr, nose clr  Neck: no nodes, no JVD, thyroid nl  lungs: decr BS; no wheeze; no crackles  hrt: s1 s2 rrr no murmur  abd: soft, NT/ND, no HS megaly  back: mild tender lumbar area  ext: no edema, no c/c  neuro: aa ox3, cn intact, can move all 4 ext; b/l mild hand tremor L>R    LABS:                      15.7    (    94.2   11.93 )-----------( ---------      158      ( 07 Feb 2024 08:10 )             44.0    (    11.9   was on steroids    140   (   102   (   136      02-07-24 @ 08:10  ----------------------               4.1   (   26   (   16                             -----                        0.9  Ca  8.5   Mg  --    P   --     LFT  6.2  (  0.7  (  26       02-07-24 @ 08:10  -------------------------  3.7  (  70  (  7    Urinalysis Basic - ( 07 Feb 2024 08:10 )    Color: x / Appearance: x / SG: x / pH: x  Gluc: 136 mg/dL / Ketone: x  / Bili: x / Urobili: x   Blood: x / Protein: x / Nitrite: x   Leuk Esterase: x / RBC: x / WBC x   Sq Epi: x / Non Sq Epi: x / Bacteria: x    RADIOLOGY & ADDITIONAL TESTS:      MEDICATIONS:    acetaminophen     Tablet .. 650 milliGRAM(s) Oral every 6 hours  allopurinol 200 milliGRAM(s) Oral daily  amLODIPine   Tablet 5 milliGRAM(s) Oral daily  aspirin  chewable 81 milliGRAM(s) Oral daily  carbidopa/levodopa  25/100 2 Tablet(s) Oral <User Schedule>  carbidopa/levodopa CR 50/200 1 Tablet(s) Oral <User Schedule>  carvedilol 3.125 milliGRAM(s) Oral every 12 hours  chlorhexidine 2% Cloths 1 Application(s) Topical daily  chlorhexidine 2% Cloths 1 Application(s) Topical daily  enoxaparin Injectable 40 milliGRAM(s) SubCutaneous every 24 hours  entacapone 200 milliGRAM(s) Oral three times a day  lactated ringers. 1000 milliLiter(s) IV Continuous <Continuous>  lactulose Syrup 20 Gram(s) Oral every 2 hours PRN  levETIRAcetam 500 milliGRAM(s) Oral two times a day  lidocaine   4% Patch 1 Patch Transdermal daily  meclizine 12.5 milliGRAM(s) Oral two times a day  methocarbamol 750 milliGRAM(s) Oral every 8 hours  polyethylene glycol 3350 17 Gram(s) Oral every 24 hours  rivastigmine patch  9.5 mG/24 Hr(s) 1 Patch Transdermal every 24 hours  senna 2 Tablet(s) Oral at bedtime  traMADol 50 milliGRAM(s) Oral every 8 hours PRN  zaleplon 5 milliGRAM(s) Oral at bedtime PRN

## 2024-02-07 NOTE — PROGRESS NOTE ADULT - ASSESSMENT
Patient is a 74 y/o man with PMHx of Parkinson's disease (diagnosed 7 years ago, HTN, 2 x CVA (1998, 2016), DM, Seizure (2019) on keppra presenting to the ED after 2 x falls. 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 He has a brilliant mind. He has never smoked, drank or used and drugs in his entire life. He has lead a truly remarkable life. Pt was on tour in [place] [employer] and fell on stage at end of show and needed asst getting up. He then flew to [place], but could not get out of plane. Pt was hospitalized and this was the begin of his Parkinson's.    # COVID infection; thrombocytopenia  quarantine 1/31-2/9 (d/c iso on 2/10)  had symp w/ fever and RA down to 88% at times; remains off O2 and RA sat 97%  I do NOT believe this is cause of leg weakness; but certainly is cause of gen fatigue and tiredness  CXR unremarkable  ID consult noted  plt are usually nl (as of 6/2023) -> plt should get better on their own; ok to use DVT ppx  completed RDV x5 days (2/2-2/6)  completed Dexa 6mg po q24 (2/2-2/7) (prefer to use < 10 days to avoid steroid myopathy; also getting some mild steroid-induced hyperglycemia in BMP)    # Fall 2/2 suspected akinesia from Parkinson's disease; Hx of Seizure (2019); hx vertigo  orthostatic hypotension neg  no syncope; no vertigo   seen by ENT 06/2023: MR head negative, orthostatic vital signs negative (06/2023)  Trauma w/u negative for acute traumatic injury.   neuro feels he is having morning freeze episodes 2/2 wearing off of sinemet because of lack of long-acting version at bedtime  make sinemet 25/100 2 tab at 7am, 12 pm and 5pm  make sinemet ER 50/200 1 tab HS (10pm) - this is helping  c/w entacapone, rivastigmine    PT eval - walked 40' w/ asst; pt walked slowly and gets tired easily  pt accepted to [facility]: [phone]: for MON 2/12 (need PT f/u noted on 2/10 or 2/11)  fall precautions  c/w meclizine 12.5 q12    # Hx of CVA (1996, 2016)   CT H noted: stable mod chr microvasc changes and chr lacunar infarcts  c/w asa 81 q24  not on statin - LDL 80    # hx Sz  on keppra 500mg BID    # lumbar back pain after fall  no trauma seen on CT A/P  no further imaging needed for now  no further w/u  PT eval  lido patch top q24  tyl 650mg po q6 standing  c/w robaxin 750mg po q8  tramadol 50mg po q8 prn     # HTN  decr carvedilol 3.125mg q12 (bradycardia)  c/w norvasc 5mg po q24 - might need to incr  d/c IVFs    # hx gout - pt says this has not been too bad recently  c/w allopurinol 200mg po q24  uric acid 4.9    # Insomnia   on zaleplon 5mg hs prn sleep    # constipation  lactulose prn  PEG q24 + senna 2 hs    # Apical lung mass- incidental finding  CT chest (01/31/2024): 1.8 cm left apical lung mass, suspicious for malignancy-new from neck CT of 6/1/2023 were the lung apices were imaged. Indeterminate right pulmonary nodular opacities.   Pulm consult: noted  agree, no reason to bx inpt, vita w/ COVID +; do outpt PET scan and plan for outpt bx after COVID past 2 wks.    # Colonic lesion vs adherent stool  there is no rush to eval lesions that could simply be stool  outpt GI consult for colonoscopy once pt is well and past COVID quarantine    # hx of obesity; s/p lap band in past, which became infected at one point and needed surgery to fix it    # asymp GB stones  no further intervention    # rt renal calc and 8mm bladder calc; rt renal cyst; hx urine incontinence   outpt uro eval    # GI prophylaxis: None    # DVT prophylaxis: lovenox 40 q24    # Activity: PT; amb w/ asst; use walker     # Code Status: Full code    # Shanon ELLSWORTH ([phone]):     Dispo: PT prn; tx back pain; tx COVID; d/c quarantine on 2/10;   eventually, pt will need STR @ [facility] - f/u CM - anticipate d/c Mon 2/12  outpt pulm f/u and PET scan; outpt uro f/u re stones; outpt GI eval for c-scope; outpt neuro f/u re PD; outpt IM f/u

## 2024-02-07 NOTE — PROGRESS NOTE ADULT - ASSESSMENT
Patient is a 74 y/o man with PMHx of Parkinson's disease (diagnosed 7 years ago, HTN, 2 x CVA (1998, 2016), DM, Seizure (2019) on keppra presenting to the ED after 2 x falls. (of note, patient was admitted for same complaints in 09/22/2022)    # COVID infection; thrombocytopenia  - Quarantine 1/31-2/9  - Not on oxygen  - RDV 5-day course last dose 2/6  - Likely the cause of fatigue  - CT chest 1/31 w/ IV: 1.8 cm left apical lung mass, suspicious for malignancy-new from neck CT of 6/1/2023 were the lung apices were imaged. Recommend oncologic evaluation. Pulm 2/1 --> outpatient PET/CT and PFTs.  Platelets improving  Dexa 6mg po q24 stopped on 2/7 due to clinical improvement    # Fall 2/2 suspected akinesia from Parkinson's disease; Hx of Seizure (2019); hx vertigo  - orthostatic hypotension neg  - no syncope; no vertigo   - seen by ENT 06/2023: MR head negative, orthostatic vital signs negative (06/2023)  - Trauma w/u negative for acute traumatic injury.   - neuro feels he is having morning freeze episodes 2/2 wearing off of sinemet because of lack of long-acting version at bedtime  - sinemet 25/100 2 tab at 7am, 12 pm and 5pm  - sinemet ER 50/200 1 tab HS (10pm) - this is helping  - c/w entacapone, rivastigmine    - PT eval - walked 40' w/ asst; pt walked slowly and gets tired easily  - neuro requesting pt go for rehab at [facility]: [phone]  - fall precautions  - c/w meclizine 12.5 q12  - Family is looking for new neurologist, can provide information to movement specialist - Dr. Nicholas or Dr. Garnica after discharge    # Hx of CVA (1996, 2016)   - CT H noted: stable mod chr microvasc changes and chr lacunar infarcts  - c/w asa 81 q24  - not on statin - LDL 80    # hx Sz  - on keppra 500mg BID    # lumbar back pain after fall - improving  no trauma seen on CT A/P  no further imaging needed for now  no further w/u  PT eval 2/2 --> home PT with [facility]  lido patch top q24  tyl 975mg po q8 standing  robaxin 750mg po q6  tramadol 50mg po q8 prn (do not raise dose)    # hypomagnesemia  - Replete PRN    # HTN  c/w carvedilol 12.5 q12 --> reduced to 3.125 dose due to bradycardia  c/w norvasc 5mg po q24 (can increase if needed for BP)     # hx gout - pt says this has not been too bad recently  c/w allopurinol 200mg po q24  uric acid 4.9    # Insomnia   - Neurology recommends using zaleplon due to reduced risk of fall    # Apical lung mass- incidental finding  CT chest (01/31/2024): 1.8 cm left apical lung mass, suspicious for malignancy-new from neck CT of 6/1/2023 were the lung apices were imaged. Indeterminate right pulmonary nodular opacities.   Pulm consult 2/1 --> outpatient PET/CT and PFTs    # Colonic lesion vs adherent stool  - GI consult requested on 2/5 per attending  - 2/6 fellow note states will coordinate outpatient colonoscopy with Dr. Nelson.    # hx of obesity; s/p lap band in past, which became infected at one point and needed surgery to fix it    # asymp GB stones  no further intervention    # rt renal calc and 8mm bladder calc; rt renal cyst; hx urine incontinence   outpt uro eval    # GI prophylaxis: None    # DVT prophylaxis: Lovenox 40 q24    # Activity: need PT for back pain; WBAT;     # Code Status: Full code    # Shanon ELLSWORTH ([phone]):     Dispo: PT prn;  tx back pain; tx COVID; quarantine to 2/9;   eventually, pt will need STR - we prefer [facility]. Per CM, patient can get accepted on 2/12  outpt pulm f/u and PET scan; outpt uro f/u re stones; GI eval for c-scope; outpt neuro f/u re PD; outpt IM f/u

## 2024-02-08 LAB
ALBUMIN SERPL ELPH-MCNC: 3.2 G/DL — LOW (ref 3.5–5.2)
ALP SERPL-CCNC: 83 U/L — SIGNIFICANT CHANGE UP (ref 30–115)
ALT FLD-CCNC: 10 U/L — SIGNIFICANT CHANGE UP (ref 0–41)
AST SERPL-CCNC: 29 U/L — SIGNIFICANT CHANGE UP (ref 0–41)
BASOPHILS # BLD AUTO: 0.04 K/UL — SIGNIFICANT CHANGE UP (ref 0–0.2)
BASOPHILS NFR BLD AUTO: 0.3 % — SIGNIFICANT CHANGE UP (ref 0–1)
BILIRUB DIRECT SERPL-MCNC: <0.2 MG/DL — SIGNIFICANT CHANGE UP (ref 0–0.3)
BILIRUB INDIRECT FLD-MCNC: >0.2 MG/DL — SIGNIFICANT CHANGE UP (ref 0.2–1.2)
BILIRUB SERPL-MCNC: 0.4 MG/DL — SIGNIFICANT CHANGE UP (ref 0.2–1.2)
CREAT SERPL-MCNC: 0.9 MG/DL — SIGNIFICANT CHANGE UP (ref 0.7–1.5)
EGFR: 91 ML/MIN/1.73M2 — SIGNIFICANT CHANGE UP
EOSINOPHIL # BLD AUTO: 0.03 K/UL — SIGNIFICANT CHANGE UP (ref 0–0.7)
EOSINOPHIL NFR BLD AUTO: 0.2 % — SIGNIFICANT CHANGE UP (ref 0–8)
HCT VFR BLD CALC: 41.9 % — LOW (ref 42–52)
HGB BLD-MCNC: 15 G/DL — SIGNIFICANT CHANGE UP (ref 14–18)
IMM GRANULOCYTES NFR BLD AUTO: 1.9 % — HIGH (ref 0.1–0.3)
LYMPHOCYTES # BLD AUTO: 25 % — SIGNIFICANT CHANGE UP (ref 20.5–51.1)
LYMPHOCYTES # BLD AUTO: 3.05 K/UL — SIGNIFICANT CHANGE UP (ref 1.2–3.4)
MCHC RBC-ENTMCNC: 33.9 PG — HIGH (ref 27–31)
MCHC RBC-ENTMCNC: 35.8 G/DL — SIGNIFICANT CHANGE UP (ref 32–37)
MCV RBC AUTO: 94.8 FL — HIGH (ref 80–94)
MONOCYTES # BLD AUTO: 1.02 K/UL — HIGH (ref 0.1–0.6)
MONOCYTES NFR BLD AUTO: 8.4 % — SIGNIFICANT CHANGE UP (ref 1.7–9.3)
NEUTROPHILS # BLD AUTO: 7.84 K/UL — HIGH (ref 1.4–6.5)
NEUTROPHILS NFR BLD AUTO: 64.2 % — SIGNIFICANT CHANGE UP (ref 42.2–75.2)
NRBC # BLD: 0 /100 WBCS — SIGNIFICANT CHANGE UP (ref 0–0)
PLATELET # BLD AUTO: 167 K/UL — SIGNIFICANT CHANGE UP (ref 130–400)
PMV BLD: 10.3 FL — SIGNIFICANT CHANGE UP (ref 7.4–10.4)
PROT SERPL-MCNC: 5.8 G/DL — LOW (ref 6–8)
RBC # BLD: 4.42 M/UL — LOW (ref 4.7–6.1)
RBC # FLD: 11.9 % — SIGNIFICANT CHANGE UP (ref 11.5–14.5)
WBC # BLD: 12.21 K/UL — HIGH (ref 4.8–10.8)
WBC # FLD AUTO: 12.21 K/UL — HIGH (ref 4.8–10.8)

## 2024-02-08 PROCEDURE — 99232 SBSQ HOSP IP/OBS MODERATE 35: CPT

## 2024-02-08 RX ORDER — CHLORHEXIDINE GLUCONATE 213 G/1000ML
1 SOLUTION TOPICAL DAILY
Refills: 0 | Status: DISCONTINUED | OUTPATIENT
Start: 2024-02-08 | End: 2024-02-12

## 2024-02-08 RX ORDER — AMLODIPINE BESYLATE 2.5 MG/1
10 TABLET ORAL DAILY
Refills: 0 | Status: DISCONTINUED | OUTPATIENT
Start: 2024-02-08 | End: 2024-02-12

## 2024-02-08 RX ADMIN — Medication 650 MILLIGRAM(S): at 05:44

## 2024-02-08 RX ADMIN — POLYETHYLENE GLYCOL 3350 17 GRAM(S): 17 POWDER, FOR SOLUTION ORAL at 11:12

## 2024-02-08 RX ADMIN — CHLORHEXIDINE GLUCONATE 1 APPLICATION(S): 213 SOLUTION TOPICAL at 11:12

## 2024-02-08 RX ADMIN — SENNA PLUS 2 TABLET(S): 8.6 TABLET ORAL at 21:44

## 2024-02-08 RX ADMIN — RIVASTIGMINE 1 PATCH: 4.6 PATCH, EXTENDED RELEASE TRANSDERMAL at 19:12

## 2024-02-08 RX ADMIN — RIVASTIGMINE 1 PATCH: 4.6 PATCH, EXTENDED RELEASE TRANSDERMAL at 12:35

## 2024-02-08 RX ADMIN — Medication 12.5 MILLIGRAM(S): at 05:44

## 2024-02-08 RX ADMIN — Medication 12.5 MILLIGRAM(S): at 17:07

## 2024-02-08 RX ADMIN — CARBIDOPA AND LEVODOPA 2 TABLET(S): 25; 100 TABLET ORAL at 17:08

## 2024-02-08 RX ADMIN — RIVASTIGMINE 1 PATCH: 4.6 PATCH, EXTENDED RELEASE TRANSDERMAL at 11:11

## 2024-02-08 RX ADMIN — CHLORHEXIDINE GLUCONATE 1 APPLICATION(S): 213 SOLUTION TOPICAL at 11:35

## 2024-02-08 RX ADMIN — ENTACAPONE 200 MILLIGRAM(S): 200 TABLET, FILM COATED ORAL at 12:39

## 2024-02-08 RX ADMIN — LEVETIRACETAM 500 MILLIGRAM(S): 250 TABLET, FILM COATED ORAL at 17:08

## 2024-02-08 RX ADMIN — CARBIDOPA AND LEVODOPA 1 TABLET(S): 25; 100 TABLET ORAL at 21:58

## 2024-02-08 RX ADMIN — ENTACAPONE 200 MILLIGRAM(S): 200 TABLET, FILM COATED ORAL at 21:44

## 2024-02-08 RX ADMIN — CARVEDILOL PHOSPHATE 3.12 MILLIGRAM(S): 80 CAPSULE, EXTENDED RELEASE ORAL at 05:44

## 2024-02-08 RX ADMIN — ENOXAPARIN SODIUM 40 MILLIGRAM(S): 100 INJECTION SUBCUTANEOUS at 05:45

## 2024-02-08 RX ADMIN — CHLORHEXIDINE GLUCONATE 1 APPLICATION(S): 213 SOLUTION TOPICAL at 11:11

## 2024-02-08 RX ADMIN — LIDOCAINE 1 PATCH: 4 CREAM TOPICAL at 11:10

## 2024-02-08 RX ADMIN — ENTACAPONE 200 MILLIGRAM(S): 200 TABLET, FILM COATED ORAL at 05:46

## 2024-02-08 RX ADMIN — METHOCARBAMOL 750 MILLIGRAM(S): 500 TABLET, FILM COATED ORAL at 21:44

## 2024-02-08 RX ADMIN — LIDOCAINE 1 PATCH: 4 CREAM TOPICAL at 18:30

## 2024-02-08 RX ADMIN — Medication 650 MILLIGRAM(S): at 17:09

## 2024-02-08 RX ADMIN — AMLODIPINE BESYLATE 5 MILLIGRAM(S): 2.5 TABLET ORAL at 05:44

## 2024-02-08 RX ADMIN — LEVETIRACETAM 500 MILLIGRAM(S): 250 TABLET, FILM COATED ORAL at 05:44

## 2024-02-08 RX ADMIN — Medication 650 MILLIGRAM(S): at 23:04

## 2024-02-08 RX ADMIN — CARVEDILOL PHOSPHATE 3.12 MILLIGRAM(S): 80 CAPSULE, EXTENDED RELEASE ORAL at 17:08

## 2024-02-08 RX ADMIN — Medication 650 MILLIGRAM(S): at 06:46

## 2024-02-08 RX ADMIN — Medication 650 MILLIGRAM(S): at 18:14

## 2024-02-08 RX ADMIN — Medication 200 MILLIGRAM(S): at 11:09

## 2024-02-08 RX ADMIN — CARBIDOPA AND LEVODOPA 2 TABLET(S): 25; 100 TABLET ORAL at 05:46

## 2024-02-08 RX ADMIN — CARBIDOPA AND LEVODOPA 2 TABLET(S): 25; 100 TABLET ORAL at 11:10

## 2024-02-08 RX ADMIN — Medication 81 MILLIGRAM(S): at 11:10

## 2024-02-08 RX ADMIN — Medication 650 MILLIGRAM(S): at 11:39

## 2024-02-08 RX ADMIN — METHOCARBAMOL 750 MILLIGRAM(S): 500 TABLET, FILM COATED ORAL at 14:18

## 2024-02-08 RX ADMIN — Medication 650 MILLIGRAM(S): at 11:09

## 2024-02-08 RX ADMIN — METHOCARBAMOL 750 MILLIGRAM(S): 500 TABLET, FILM COATED ORAL at 05:44

## 2024-02-08 NOTE — PROGRESS NOTE ADULT - ASSESSMENT
Patient is a 74 y/o man with PMHx of Parkinson's disease (diagnosed 7 years ago, HTN, 2 x CVA (1998, 2016), DM, Seizure (2019) on keppra presenting to the ED after 2 x falls. (of note, patient was admitted for same complaints in 09/22/2022)    # COVID infection; thrombocytopenia  - Quarantine 1/31-2/9, d/c isolation on 2/10/24.  - Not on oxygen  - RDV 5-day course last dose 2/6  - Likely the cause of fatigue  - CT chest 1/31 w/ IV: 1.8 cm left apical lung mass, suspicious for malignancy-new from neck CT of 6/1/2023 were the lung apices were imaged. Recommend oncologic evaluation. Pulm 2/1 --> outpatient PET/CT and PFTs.  Platelets improving  Dexa 6mg po q24 stopped on 2/7 due to clinical improvement    # Fall 2/2 suspected akinesia from Parkinson's disease; Hx of Seizure (2019); hx vertigo  - orthostatic hypotension neg  - no syncope; no vertigo   - seen by ENT 06/2023: MR head negative, orthostatic vital signs negative (06/2023)  - Trauma w/u negative for acute traumatic injury.   - neuro feels he is having morning freeze episodes 2/2 wearing off of sinemet because of lack of long-acting version at bedtime  - sinemet 25/100 2 tab at 7am, 12 pm and 5pm  - sinemet ER 50/200 1 tab HS (10pm) - this is helping  - c/w entacapone, rivastigmine    - PT eval 2/7: 45 feet;  weight-bearing as tolerated   No AD, Hand held assist;  contact guard;  1 person assist;  verbal cues  - neuro requesting pt go for rehab at [facility]: [phone]  - fall precautions  - c/w meclizine 12.5 q12  - Family is looking for new neurologist, can provide information to movement specialist - Dr. Nicholas or Dr. Garnica after discharge    # Hx of CVA (1996, 2016)   - CT H noted: stable mod chr microvasc changes and chr lacunar infarcts  - c/w asa 81 q24  - not on statin - LDL 80    # hx Sz  - on keppra 500mg BID    # lumbar back pain after fall - improving  no trauma seen on CT A/P  no further imaging needed for now  no further w/u  PT eval 2/7: 45 feet;  weight-bearing as tolerated   No AD, Hand held assist;  contact guard;  1 person assist;  verbal cues  lido patch top q24  tyl 975mg po q8 standing  robaxin 750mg po q6  tramadol 50mg po q8 prn (do not raise dose)    # hypomagnesemia  - Replete PRN    # HTN  c/w carvedilol 12.5 q12 --> reduced to 3.125 dose due to bradycardia  c/w norvasc 5mg po q24 (can increase if needed for BP)     # hx gout - pt says this has not been too bad recently  c/w allopurinol 200mg po q24  uric acid 4.9    # Insomnia   - Neurology recommends using zaleplon due to reduced risk of fall    # Apical lung mass- incidental finding  CT chest (01/31/2024): 1.8 cm left apical lung mass, suspicious for malignancy-new from neck CT of 6/1/2023 were the lung apices were imaged. Indeterminate right pulmonary nodular opacities.   Pulm consult 2/1 --> outpatient PET/CT and PFTs    # Colonic lesion vs adherent stool  - GI consult requested on 2/5 per attending  - 2/6 fellow note states will coordinate outpatient colonoscopy with Dr. Nelson.    # Constipation  - Senna 2 tabs qhs, Miralax q24h, lactulose PRN     # hx of obesity; s/p lap band in past, which became infected at one point and needed surgery to fix it    # asymp GB stones  no further intervention    # rt renal calc and 8mm bladder calc; rt renal cyst; hx urine incontinence   outpt uro eval    # GI prophylaxis: None    # DVT prophylaxis: Lovenox 40 q24    # Activity: need PT for back pain; WBAT;     # Code Status: Full code    # Shanon ELLSWORTH ([phone]):     Dispo: COVID quarantine to 2/9; dc on 2/12/24 to [facility].

## 2024-02-08 NOTE — PROGRESS NOTE ADULT - SUBJECTIVE AND OBJECTIVE BOX
ZAKIA MILLS  73y  Male  ***My note supersedes ALL resident notes that I sign.  My corrections for their notes are in my note.***    I can be reached directly on [id number]. My office number is [phone]. My personal cell number is [phone].    INTERVAL EVENTS: Here for f/u of COVID. Pt doing well    T(F): 97.4 (02-08-24 @ 12:33), Max: 97.6 (02-08-24 @ 05:35)  HR: 64 (02-08-24 @ 17:07) (58 - 77)  BP: 162/84 (02-08-24 @ 17:07) (153/74 - 162/84)  RR: 18 (02-08-24 @ 17:07) (18 - 18)  SpO2: --    Gen: NAD; somewhat flat affect; tired  HEENT: PERRL, EOMI, mouth clr, nose clr  Neck: no nodes, no JVD, thyroid nl  lungs: decr BS; no wheeze; no crackles  hrt: s1 s2 rrr no murmur  abd: soft, NT/ND, no HS megaly  back: mild tender lumbar area  ext: no edema, no c/c  neuro: aa ox3, cn intact, can move all 4 ext; b/l mild hand tremor L>R    LABS:                      15.0    (    94.8   12.21 )-----------( ---------      167      ( 08 Feb 2024 06:37 )             41.9    (    11.9   was on steroids    --   (   --   (   --      02-08-24 @ 06:37  ----------------------               --   (   --   (   --                             -----                        0.9  Ca  --   Mg  --    P   --     LFT  5.8  (  0.4  (  29       02-08-24 @ 06:37  -------------------------  3.2  (  83  (  10    Urinalysis Basic - ( 07 Feb 2024 08:10 )    Color: x / Appearance: x / SG: x / pH: x  Gluc: 136 mg/dL / Ketone: x  / Bili: x / Urobili: x   Blood: x / Protein: x / Nitrite: x   Leuk Esterase: x / RBC: x / WBC x   Sq Epi: x / Non Sq Epi: x / Bacteria: x    RADIOLOGY & ADDITIONAL TESTS:    MEDICATIONS:    acetaminophen     Tablet .. 650 milliGRAM(s) Oral every 6 hours  allopurinol 200 milliGRAM(s) Oral daily  amLODIPine   Tablet 5 milliGRAM(s) Oral daily  aspirin  chewable 81 milliGRAM(s) Oral daily  carbidopa/levodopa  25/100 2 Tablet(s) Oral <User Schedule>  carbidopa/levodopa CR 50/200 1 Tablet(s) Oral <User Schedule>  carvedilol 3.125 milliGRAM(s) Oral every 12 hours  chlorhexidine 2% Cloths 1 Application(s) Topical daily  enoxaparin Injectable 40 milliGRAM(s) SubCutaneous every 24 hours  entacapone 200 milliGRAM(s) Oral three times a day  lactulose Syrup 20 Gram(s) Oral every 2 hours PRN  levETIRAcetam 500 milliGRAM(s) Oral two times a day  lidocaine   4% Patch 1 Patch Transdermal daily  meclizine 12.5 milliGRAM(s) Oral two times a day  methocarbamol 750 milliGRAM(s) Oral every 8 hours  polyethylene glycol 3350 17 Gram(s) Oral every 24 hours  rivastigmine patch  9.5 mG/24 Hr(s) 1 Patch Transdermal every 24 hours  senna 2 Tablet(s) Oral at bedtime  traMADol 50 milliGRAM(s) Oral every 8 hours PRN  zaleplon 5 milliGRAM(s) Oral at bedtime PRN

## 2024-02-08 NOTE — PROGRESS NOTE ADULT - SUBJECTIVE AND OBJECTIVE BOX
24H events:    Patient is a 73y old Male who presents with a chief complaint of Fall (07 Feb 2024 17:47)    Primary diagnosis of Fall    Today is hospital day 8d. This morning patient was seen and examined at bedside, resting comfortably in bed.    No acute or major events overnight.    The patient states that he feels better again today, states he feels stronger (reports daily improvement in how he feels). Reports no new complaints, denies CP, SOB.    Code Status:  Full      PAST MEDICAL & SURGICAL HISTORY  Parkinson disease    CVA (cerebral vascular accident)    HTN (hypertension)    Diabetes mellitus    Gout    History of appendectomy      SOCIAL HISTORY:  Social History:  Denies EtOH, tobacco, illicit drug use. (31 Jan 2024 20:52)      ALLERGIES:  sulfa drugs (Unknown)  penicillin (Unknown)  cefaclor (Unknown)    MEDICATIONS:  STANDING MEDICATIONS  acetaminophen     Tablet .. 650 milliGRAM(s) Oral every 6 hours  allopurinol 200 milliGRAM(s) Oral daily  amLODIPine   Tablet 5 milliGRAM(s) Oral daily  aspirin  chewable 81 milliGRAM(s) Oral daily  carbidopa/levodopa  25/100 2 Tablet(s) Oral <User Schedule>  carbidopa/levodopa CR 50/200 1 Tablet(s) Oral <User Schedule>  carvedilol 3.125 milliGRAM(s) Oral every 12 hours  chlorhexidine 2% Cloths 1 Application(s) Topical daily  chlorhexidine 2% Cloths 1 Application(s) Topical daily  enoxaparin Injectable 40 milliGRAM(s) SubCutaneous every 24 hours  entacapone 200 milliGRAM(s) Oral three times a day  levETIRAcetam 500 milliGRAM(s) Oral two times a day  lidocaine   4% Patch 1 Patch Transdermal daily  meclizine 12.5 milliGRAM(s) Oral two times a day  methocarbamol 750 milliGRAM(s) Oral every 8 hours  polyethylene glycol 3350 17 Gram(s) Oral every 24 hours  rivastigmine patch  9.5 mG/24 Hr(s) 1 Patch Transdermal every 24 hours  senna 2 Tablet(s) Oral at bedtime    PRN MEDICATIONS  lactulose Syrup 20 Gram(s) Oral every 2 hours PRN  traMADol 50 milliGRAM(s) Oral every 8 hours PRN  zaleplon 5 milliGRAM(s) Oral at bedtime PRN    VITALS:   T(F): 97.6  HR: 59  BP: 153/74  RR: 18  SpO2: --    PHYSICAL EXAM:  GENERAL:   ( X ) NAD, lying in bed comfortably     (  ) obtunded     (  ) lethargic     (  ) somnolent    HEAD:   ( X ) Atraumatic     (  ) hematoma     (  ) laceration (specify location:       )     NECK:  ( X ) Supple     (  ) neck stiffness     (  ) nuchal rigidity     (  )  no JVD     (  ) JVD present ( -- cm)    HEART:  Rate -->     ( X ) normal rate     (  ) bradycardic     (  ) tachycardic  Rhythm -->     ( X ) regular     (  ) regularly irregular     (  ) irregularly irregular  Murmurs -->     ( X ) normal s1s2     (  ) systolic murmur     (  ) diastolic murmur     (  ) continuous murmur      (  ) S3 present     (  ) S4 present    LUNGS:   ( X ) Unlabored respirations     (  ) tachypnea  ( X ) B/L air entry     (  ) decreased breath sounds in:  (location     )    ( X ) no adventitious sound     (  ) crackles     (  ) wheezing      (  ) rhonchi      (specify location:       )  (  ) chest wall tenderness (specify location:       )    ABDOMEN:   ( X ) Soft     (  ) tense   |   ( X ) nondistended     (  ) distended   |   (  ) +BS     (  ) hypoactive bowel sounds     (  ) hyperactive bowel sounds  ( X ) nontender     (  ) RUQ tenderness     (  ) RLQ tenderness     (  ) LLQ tenderness     (  ) epigastric tenderness     (  ) diffuse tenderness  (  ) Splenomegaly      (  ) Hepatomegaly      (  ) Jaundice     (  ) ecchymosis     EXTREMITIES:  ( X ) Normal     (  ) Rash     (  ) ecchymosis     (  ) varicose veins      (  ) pitting edema     (  ) non-pitting edema   (  ) ulceration     (  ) gangrene:     (location:     )    NERVOUS SYSTEM:    ( X ) A&Ox3     (  ) confused     (  ) lethargic  CN II-XII:     ( X ) Intact     (  ) deficits found     (Specify:     )   Upper extremities:     ( X ) no sensorimotor deficits     (  ) weakness     (  ) loss of proprioception/vibration     (  ) loss of touch/temperature (specify:    )  Lower extremities:     ( X ) no sensorimotor deficits     (  ) weakness     (  ) loss of proprioception/vibration     (  ) loss of touch/temperature (specify:    )    LABS:                        15.0   12.21 )-----------( 167      ( 08 Feb 2024 06:37 )             41.9     02-08    x   |  x   |  x   ----------------------------<  x   x    |  x   |  0.9    Ca    8.5      07 Feb 2024 08:10    TPro  5.8<L>  /  Alb  3.2<L>  /  TBili  0.4  /  DBili  <0.2  /  AST  29  /  ALT  10  /  AlkPhos  83  02-08

## 2024-02-08 NOTE — PROGRESS NOTE ADULT - ASSESSMENT
Patient is a 72 y/o man with PMHx of Parkinson's disease (diagnosed 7 years ago, HTN, 2 x CVA (1998, 2016), DM, Seizure (2019) on keppra presenting to the ED after 2 x falls. 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 He has a brilliant mind. He has never smoked, drank or used and drugs in his entire life. He has lead a truly remarkable life. Pt was on tour in [place] and fell on stage at end of show and needed asst getting up. He then flew to [place], but could not get out of plane. Pt was hospitalized and this was the begin of his Parkinson's.    # COVID infection; thrombocytopenia  quarantine 1/31-2/9 (d/c iso on 2/10)  had symp w/ fever and RA down to 88% at times; remains off O2 and RA sat 97%  I do NOT believe this is cause of leg weakness; but certainly is cause of gen fatigue and tiredness  CXR unremarkable  ID consult noted  plt are usually nl (as of 6/2023) -> plt got better on their own; ok to use DVT ppx  completed RDV x5 days (2/2-2/6)  completed Dexa 6mg po q24 (2/2-2/7) (prefer to use < 10 days to avoid steroid myopathy; also getting some mild steroid-induced hyperglycemia in BMP)    # Fall 2/2 suspected akinesia from Parkinson's disease; Hx of Seizure (2019); hx vertigo  orthostatic hypotension neg  no syncope; no vertigo   seen by ENT 06/2023: MR head negative, orthostatic vital signs negative (06/2023)  Trauma w/u negative for acute traumatic injury.   neuro feels he is having morning freeze episodes 2/2 wearing off of sinemet because of lack of long-acting version at bedtime  make sinemet 25/100 2 tab at 7am, 12 pm and 5pm  make sinemet ER 50/200 1 tab HS (10pm) - this is helping  c/w entacapone, rivastigmine    PT eval - walked 40' w/ asst; pt walked slowly and gets tired easily  pt accepted to [facility]: [phone]: for MON 2/12 (need PT f/u noted on 2/10 or 2/11)  fall precautions  c/w meclizine 12.5 q12    # Hx of CVA (1996, 2016)   CT H noted: stable mod chr microvasc changes and chr lacunar infarcts  c/w asa 81 q24  not on statin - LDL 80    # hx Sz  on keppra 500mg BID    # lumbar back pain after fall  no trauma seen on CT A/P  no further imaging needed for now  no further w/u  PT eval  lido patch top q24  tyl 650mg po q6 standing  robaxin 750mg po q8  tramadol 50mg po q8 prn     # HTN  decr carvedilol 3.125mg q12 (bradycardia)  incr norvasc 10mg po q24  might need 3rd drug  d/c IVFs    # hx gout - pt says this has not been too bad recently  c/w allopurinol 200mg po q24  uric acid 4.9    # Insomnia   on zaleplon 5mg hs prn sleep    # constipation  lactulose prn  PEG q24 + senna 2 hs    # Apical lung mass- incidental finding  CT chest (01/31/2024): 1.8 cm left apical lung mass, suspicious for malignancy-new from neck CT of 6/1/2023 were the lung apices were imaged. Indeterminate right pulmonary nodular opacities.   Pulm consult: noted  agree, no reason to bx inpt, vita w/ COVID +; do outpt PET scan and plan for outpt bx after COVID past 2 wks.    # Colonic lesion vs adherent stool  there is no rush to eval lesions that could simply be stool  outpt GI consult for colonoscopy once pt is well and past COVID quarantine    # rt renal calc and 8mm bladder calc; rt renal cyst; hx urine incontinence   outpt uro eval    # asymp GB stones  no further intervention    # hx of obesity; s/p lap band in past, which became infected at one point and needed surgery to fix it    # GI prophylaxis: None    # DVT prophylaxis: lovenox 40 q24    # Activity: PT; amb w/ asst; use walker     # Code Status: Full code    # SENG Shanonsav Aguila ([phone]):     Dispo: PT prn; tx back pain; tx COVID; d/c quarantine on 2/10; tx BP; LIMIT LABS  eventually, pt will need STR @ [facility] - f/u CM - anticipate d/c Mon 2/12  outpt pulm f/u and PET scan; outpt uro f/u re stones; outpt GI eval for c-scope; outpt neuro f/u re PD; outpt IM f/u

## 2024-02-09 PROCEDURE — 99232 SBSQ HOSP IP/OBS MODERATE 35: CPT

## 2024-02-09 RX ADMIN — Medication 650 MILLIGRAM(S): at 17:35

## 2024-02-09 RX ADMIN — Medication 650 MILLIGRAM(S): at 11:52

## 2024-02-09 RX ADMIN — ENTACAPONE 200 MILLIGRAM(S): 200 TABLET, FILM COATED ORAL at 21:25

## 2024-02-09 RX ADMIN — CARBIDOPA AND LEVODOPA 2 TABLET(S): 25; 100 TABLET ORAL at 11:53

## 2024-02-09 RX ADMIN — LACTULOSE 20 GRAM(S): 10 SOLUTION ORAL at 11:51

## 2024-02-09 RX ADMIN — CARBIDOPA AND LEVODOPA 2 TABLET(S): 25; 100 TABLET ORAL at 17:05

## 2024-02-09 RX ADMIN — RIVASTIGMINE 1 PATCH: 4.6 PATCH, EXTENDED RELEASE TRANSDERMAL at 18:08

## 2024-02-09 RX ADMIN — Medication 12.5 MILLIGRAM(S): at 06:06

## 2024-02-09 RX ADMIN — CHLORHEXIDINE GLUCONATE 1 APPLICATION(S): 213 SOLUTION TOPICAL at 11:53

## 2024-02-09 RX ADMIN — RIVASTIGMINE 1 PATCH: 4.6 PATCH, EXTENDED RELEASE TRANSDERMAL at 11:51

## 2024-02-09 RX ADMIN — Medication 650 MILLIGRAM(S): at 06:29

## 2024-02-09 RX ADMIN — METHOCARBAMOL 750 MILLIGRAM(S): 500 TABLET, FILM COATED ORAL at 06:07

## 2024-02-09 RX ADMIN — Medication 81 MILLIGRAM(S): at 11:53

## 2024-02-09 RX ADMIN — SENNA PLUS 2 TABLET(S): 8.6 TABLET ORAL at 21:25

## 2024-02-09 RX ADMIN — LIDOCAINE 1 PATCH: 4 CREAM TOPICAL at 11:51

## 2024-02-09 RX ADMIN — ENTACAPONE 200 MILLIGRAM(S): 200 TABLET, FILM COATED ORAL at 14:23

## 2024-02-09 RX ADMIN — METHOCARBAMOL 750 MILLIGRAM(S): 500 TABLET, FILM COATED ORAL at 14:23

## 2024-02-09 RX ADMIN — Medication 12.5 MILLIGRAM(S): at 17:05

## 2024-02-09 RX ADMIN — Medication 100 MILLIGRAM(S): at 14:23

## 2024-02-09 RX ADMIN — Medication 650 MILLIGRAM(S): at 00:00

## 2024-02-09 RX ADMIN — POLYETHYLENE GLYCOL 3350 17 GRAM(S): 17 POWDER, FOR SOLUTION ORAL at 11:52

## 2024-02-09 RX ADMIN — CARBIDOPA AND LEVODOPA 1 TABLET(S): 25; 100 TABLET ORAL at 21:25

## 2024-02-09 RX ADMIN — ENTACAPONE 200 MILLIGRAM(S): 200 TABLET, FILM COATED ORAL at 06:06

## 2024-02-09 RX ADMIN — Medication 650 MILLIGRAM(S): at 17:05

## 2024-02-09 RX ADMIN — Medication 100 MILLIGRAM(S): at 21:26

## 2024-02-09 RX ADMIN — LIDOCAINE 1 PATCH: 4 CREAM TOPICAL at 18:07

## 2024-02-09 RX ADMIN — Medication 650 MILLIGRAM(S): at 23:06

## 2024-02-09 RX ADMIN — ENOXAPARIN SODIUM 40 MILLIGRAM(S): 100 INJECTION SUBCUTANEOUS at 06:11

## 2024-02-09 RX ADMIN — Medication 650 MILLIGRAM(S): at 12:23

## 2024-02-09 RX ADMIN — LIDOCAINE 1 PATCH: 4 CREAM TOPICAL at 23:00

## 2024-02-09 RX ADMIN — CARBIDOPA AND LEVODOPA 2 TABLET(S): 25; 100 TABLET ORAL at 06:05

## 2024-02-09 RX ADMIN — METHOCARBAMOL 750 MILLIGRAM(S): 500 TABLET, FILM COATED ORAL at 21:25

## 2024-02-09 RX ADMIN — LEVETIRACETAM 500 MILLIGRAM(S): 250 TABLET, FILM COATED ORAL at 06:06

## 2024-02-09 RX ADMIN — Medication 200 MILLIGRAM(S): at 11:53

## 2024-02-09 RX ADMIN — LEVETIRACETAM 500 MILLIGRAM(S): 250 TABLET, FILM COATED ORAL at 17:05

## 2024-02-09 RX ADMIN — CARVEDILOL PHOSPHATE 3.12 MILLIGRAM(S): 80 CAPSULE, EXTENDED RELEASE ORAL at 17:06

## 2024-02-09 RX ADMIN — AMLODIPINE BESYLATE 10 MILLIGRAM(S): 2.5 TABLET ORAL at 06:06

## 2024-02-09 RX ADMIN — Medication 650 MILLIGRAM(S): at 06:06

## 2024-02-09 RX ADMIN — RIVASTIGMINE 1 PATCH: 4.6 PATCH, EXTENDED RELEASE TRANSDERMAL at 09:00

## 2024-02-09 RX ADMIN — CARVEDILOL PHOSPHATE 3.12 MILLIGRAM(S): 80 CAPSULE, EXTENDED RELEASE ORAL at 06:06

## 2024-02-09 NOTE — PROGRESS NOTE ADULT - SUBJECTIVE AND OBJECTIVE BOX
24H events:    Patient is a 73y old Male who presents with a chief complaint of Fall (08 Feb 2024 20:14)    Primary diagnosis of Fall    Today is hospital day 9d. This morning patient was seen and examined at bedside, resting comfortably in bed.    No acute or major events overnight.    The patient states that today he feels a little tired and woke up with a cough which was dry. The cough resolved after coughing a few times. No other new concerns, no CP, SOB.    Code Status:  Full      PAST MEDICAL & SURGICAL HISTORY  Parkinson disease    CVA (cerebral vascular accident)    HTN (hypertension)    Diabetes mellitus    Gout    History of appendectomy      SOCIAL HISTORY:  Social History:  Denies EtOH, tobacco, illicit drug use. (31 Jan 2024 20:52)      ALLERGIES:  sulfa drugs (Unknown)  penicillin (Unknown)  cefaclor (Unknown)    MEDICATIONS:  STANDING MEDICATIONS  acetaminophen     Tablet .. 650 milliGRAM(s) Oral every 6 hours  allopurinol 200 milliGRAM(s) Oral daily  amLODIPine   Tablet 10 milliGRAM(s) Oral daily  aspirin  chewable 81 milliGRAM(s) Oral daily  benzonatate 100 milliGRAM(s) Oral every 8 hours  carbidopa/levodopa  25/100 2 Tablet(s) Oral <User Schedule>  carbidopa/levodopa CR 50/200 1 Tablet(s) Oral <User Schedule>  carvedilol 3.125 milliGRAM(s) Oral every 12 hours  chlorhexidine 2% Cloths 1 Application(s) Topical daily  enoxaparin Injectable 40 milliGRAM(s) SubCutaneous every 24 hours  entacapone 200 milliGRAM(s) Oral three times a day  levETIRAcetam 500 milliGRAM(s) Oral two times a day  lidocaine   4% Patch 1 Patch Transdermal daily  meclizine 12.5 milliGRAM(s) Oral two times a day  methocarbamol 750 milliGRAM(s) Oral every 8 hours  polyethylene glycol 3350 17 Gram(s) Oral every 24 hours  rivastigmine patch  9.5 mG/24 Hr(s) 1 Patch Transdermal every 24 hours  senna 2 Tablet(s) Oral at bedtime    PRN MEDICATIONS  lactulose Syrup 20 Gram(s) Oral every 2 hours PRN  zaleplon 5 milliGRAM(s) Oral at bedtime PRN    VITALS:   T(F): 97.9  HR: 92  BP: 140/83  RR: 18  SpO2: 98%    PHYSICAL EXAM:  GENERAL:   ( X ) NAD, lying in bed comfortably     (  ) obtunded     (  ) lethargic     (  ) somnolent    HEAD:   ( X ) Atraumatic     (  ) hematoma     (  ) laceration (specify location:       )     NECK:  ( X ) Supple     (  ) neck stiffness     (  ) nuchal rigidity     (  )  no JVD     (  ) JVD present ( -- cm)    HEART:  Rate -->     ( X ) normal rate     (  ) bradycardic     (  ) tachycardic  Rhythm -->     ( X ) regular     (  ) regularly irregular     (  ) irregularly irregular  Murmurs -->     ( X ) normal s1s2     (  ) systolic murmur     (  ) diastolic murmur     (  ) continuous murmur      (  ) S3 present     (  ) S4 present    LUNGS:   ( X ) Unlabored respirations     (  ) tachypnea  ( X ) B/L air entry     (  ) decreased breath sounds in:  (location     )    ( X ) no adventitious sound     (  ) crackles     (  ) wheezing      (  ) rhonchi      (specify location:       )  (  ) chest wall tenderness (specify location:       )    ABDOMEN:   ( X ) Soft     (  ) tense   |   ( X ) nondistended     (  ) distended   |   (  ) +BS     (  ) hypoactive bowel sounds     (  ) hyperactive bowel sounds  ( X ) nontender     (  ) RUQ tenderness     (  ) RLQ tenderness     (  ) LLQ tenderness     (  ) epigastric tenderness     (  ) diffuse tenderness  (  ) Splenomegaly      (  ) Hepatomegaly      (  ) Jaundice     (  ) ecchymosis     EXTREMITIES:  ( X ) Normal     (  ) Rash     (  ) ecchymosis     (  ) varicose veins      (  ) pitting edema     (  ) non-pitting edema   (  ) ulceration     (  ) gangrene:     (location:     )    NERVOUS SYSTEM:    ( X ) A&Ox3     (  ) confused     (  ) lethargic  CN II-XII:     ( X ) Intact     (  ) deficits found     (Specify:     )   Upper extremities:     ( X ) no sensorimotor deficits     (  ) weakness     (  ) loss of proprioception/vibration     (  ) loss of touch/temperature (specify:    )  Lower extremities:     ( X ) no sensorimotor deficits     (  ) weakness     (  ) loss of proprioception/vibration     (  ) loss of touch/temperature (specify:    )    LABS:                        15.0   12.21 )-----------( 167      ( 08 Feb 2024 06:37 )             41.9     02-08    x   |  x   |  x   ----------------------------<  x   x    |  x   |  0.9      TPro  5.8<L>  /  Alb  3.2<L>  /  TBili  0.4  /  DBili  <0.2  /  AST  29  /  ALT  10  /  AlkPhos  83  02-08

## 2024-02-09 NOTE — PROGRESS NOTE ADULT - ASSESSMENT
Patient is a 72 y/o man with PMHx of Parkinson's disease (diagnosed 7 years ago, HTN, 2 x CVA (1998, 2016), DM, Seizure (2019) on keppra presenting to the ED after 2 x falls. (of note, patient was admitted for same complaints in 09/22/2022)    # COVID infection; thrombocytopenia  - Quarantine 1/31-2/9, d/c isolation on 2/10/24.  - Not on oxygen  - RDV 5-day course last dose 2/6  - Likely the cause of fatigue  - CT chest 1/31 w/ IV: 1.8 cm left apical lung mass, suspicious for malignancy-new from neck CT of 6/1/2023 were the lung apices were imaged. Recommend oncologic evaluation. Pulm 2/1 --> outpatient PET/CT and PFTs.  Platelets improving  Dexa 6mg po q24 stopped on 2/7 due to clinical improvement    # Fall 2/2 suspected akinesia from Parkinson's disease; Hx of Seizure (2019); hx vertigo  - orthostatic hypotension neg  - no syncope; no vertigo   - seen by ENT 06/2023: MR head negative, orthostatic vital signs negative (06/2023)  - Trauma w/u negative for acute traumatic injury.   - neuro feels he is having morning freeze episodes 2/2 wearing off of sinemet because of lack of long-acting version at bedtime  - sinemet 25/100 2 tab at 7am, 12 pm and 5pm  - sinemet ER 50/200 1 tab HS (10pm) - this is helping  - c/w entacapone, rivastigmine    - PT eval 2/7: 45 feet;  weight-bearing as tolerated   No AD, Hand held assist;  contact guard;  1 person assist;  verbal cues  - neuro requesting pt go for rehab at [facility]: [phone]  - fall precautions  - c/w meclizine 12.5 q12  - Family is looking for new neurologist, can provide information to movement specialist - Dr. Nicholas or Dr. Garnica after discharge    # Hx of CVA (1996, 2016)   - CT H noted: stable mod chr microvasc changes and chr lacunar infarcts  - c/w asa 81 q24  - not on statin - LDL 80    # hx Sz  - on keppra 500mg BID    # lumbar back pain after fall - improving  no trauma seen on CT A/P  no further imaging needed for now  no further w/u  PT eval 2/7: 45 feet;  weight-bearing as tolerated   No AD, Hand held assist;  contact guard;  1 person assist;  verbal cues  lido patch top q24  tyl 975mg po q8 standing  robaxin 750mg po q6  tramadol 50mg po q8 prn (do not raise dose)    # hypomagnesemia  - Replete PRN    # HTN  c/w carvedilol 12.5 q12 --> reduced to 3.125 dose due to bradycardia  c/w norvasc 5mg po q24 (can increase if needed for BP)     # hx gout - pt says this has not been too bad recently  c/w allopurinol 200mg po q24  uric acid 4.9    # Insomnia   - Neurology recommends using zaleplon due to reduced risk of fall    # Apical lung mass- incidental finding  CT chest (01/31/2024): 1.8 cm left apical lung mass, suspicious for malignancy-new from neck CT of 6/1/2023 were the lung apices were imaged. Indeterminate right pulmonary nodular opacities.   Pulm consult 2/1 --> outpatient PET/CT and PFTs    # Colonic lesion vs adherent stool  - GI consult requested on 2/5 per attending  - 2/6 fellow note states will coordinate outpatient colonoscopy with Dr. Nelson.    # Constipation  - Senna 2 tabs qhs, Miralax q24h, lactulose PRN     # hx of obesity; s/p lap band in past, which became infected at one point and needed surgery to fix it    # asymp GB stones  no further intervention    # rt renal calc and 8mm bladder calc; rt renal cyst; hx urine incontinence   outpt uro eval    # GI prophylaxis: None    # DVT prophylaxis: Lovenox 40 q24    # Activity: need PT for back pain; WBAT;     # Code Status: Full code    # Shanon ELLSWORTH ([phone]):     Dispo: COVID quarantine through 2/9; dc on 2/12/24 to [facility]. Patient is a 74 y/o man with PMHx of Parkinson's disease (diagnosed 7 years ago, HTN, 2 x CVA (1998, 2016), DM, Seizure (2019) on keppra presenting to the ED after 2 x falls. (of note, patient was admitted for same complaints in 09/22/2022)    # COVID infection; thrombocytopenia  - Quarantine 1/31-2/9, d/c isolation on 2/10/24.  - Not on oxygen  - RDV 5-day course last dose 2/6  - Likely the cause of fatigue  - CT chest 1/31 w/ IV: 1.8 cm left apical lung mass, suspicious for malignancy-new from neck CT of 6/1/2023 were the lung apices were imaged. Recommend oncologic evaluation. Pulm 2/1 --> outpatient PET/CT and PFTs.  Platelets improving  Dexa 6mg po q24 stopped on 2/7 due to clinical improvement    # Fall 2/2 suspected akinesia from Parkinson's disease; Hx of Seizure (2019); hx vertigo  - orthostatic hypotension neg  - no syncope; no vertigo   - seen by ENT 06/2023: MR head negative, orthostatic vital signs negative (06/2023)  - Trauma w/u negative for acute traumatic injury.   - neuro feels he is having morning freeze episodes 2/2 wearing off of sinemet because of lack of long-acting version at bedtime  - sinemet 25/100 2 tab at 7am, 12 pm and 5pm  - sinemet ER 50/200 1 tab HS (10pm) - this is helping  - c/w entacapone, rivastigmine    - PT eval 2/7: 45 feet;  weight-bearing as tolerated   No AD, Hand held assist;  contact guard;  1 person assist;  verbal cues  - neuro requesting pt go for rehab at [facility]: [phone]  - fall precautions  - c/w meclizine 12.5 q12  - Family is looking for new neurologist, can provide information to movement specialist - Dr. Nicholas or Dr. Garnica after discharge    # Hx of CVA (1996, 2016)   - CT H noted: stable mod chr microvasc changes and chr lacunar infarcts  - c/w asa 81 q24  - not on statin - LDL 80    # hx Sz  - on keppra 500mg BID    # lumbar back pain after fall - improving  no trauma seen on CT A/P  no further imaging needed for now  no further w/u  PT eval 2/7: 45 feet;  weight-bearing as tolerated   No AD, Hand held assist;  contact guard;  1 person assist;  verbal cues  lido patch top q24  tyl 975mg po q8 standing  robaxin 750mg po q6  tramadol 50mg po q8 prn (do not raise dose)    # hypomagnesemia  - Replete PRN    # HTN  c/w carvedilol 12.5 q12 --> reduced to 3.125 dose due to bradycardia  amlodipine increased to 10mg qd    # hx gout - pt says this has not been too bad recently  c/w allopurinol 200mg po q24  uric acid 4.9    # Insomnia   - Neurology recommends using zaleplon due to reduced risk of fall    # Apical lung mass- incidental finding  CT chest (01/31/2024): 1.8 cm left apical lung mass, suspicious for malignancy-new from neck CT of 6/1/2023 were the lung apices were imaged. Indeterminate right pulmonary nodular opacities.   Pulm consult 2/1 --> outpatient PET/CT and PFTs    # Colonic lesion vs adherent stool  - GI consult requested on 2/5 per attending  - 2/6 fellow note states will coordinate outpatient colonoscopy with Dr. Nelson.    # Constipation  - Senna 2 tabs qhs, Miralax q24h, lactulose PRN     # hx of obesity; s/p lap band in past, which became infected at one point and needed surgery to fix it    # asymp GB stones  no further intervention    # rt renal calc and 8mm bladder calc; rt renal cyst; hx urine incontinence   outpt uro eval    # GI prophylaxis: None    # DVT prophylaxis: Lovenox 40 q24    # Activity: need PT for back pain; WBAT;     # Code Status: Full code    # Shanon ELLSWORTH ([phone]):     Dispo: COVID quarantine through 2/9; dc on 2/12/24 to [facility].

## 2024-02-09 NOTE — PROGRESS NOTE ADULT - SUBJECTIVE AND OBJECTIVE BOX
ZAKIA MILLS  73y  Male  ***My note supersedes ALL resident notes that I sign.  My corrections for their notes are in my note.***    I can be reached directly on [employer]. My office number is [phone]. My personal cell number is [phone].    INTERVAL EVENTS: Here for f/u of COVID. Pt had a little bit of dry cough this AM, but then felt OK. Also, having a little constipation again.    T(F): 97.9 (02-09-24 @ 14:18), Max: 97.9 (02-09-24 @ 14:18)  HR: 92 (02-09-24 @ 17:05) (53 - 92)  BP: 140/83 (02-09-24 @ 17:05) (104/56 - 172/92)  RR: 18 (02-09-24 @ 17:05) (18 - 18)  SpO2: 98% (02-09-24 @ 08:04) (98% - 98%)    Gen: NAD; somewhat flat affect; tired  HEENT: PERRL, EOMI, mouth clr, nose clr  Neck: no nodes, no JVD, thyroid nl  lungs: decr BS; no wheeze; no crackles  hrt: s1 s2 rrr no murmur  abd: soft, NT/ND, no HS megaly  back: mild tender lumbar area  ext: no edema, no c/c  neuro: aa ox3, cn intact, can move all 4 ext; b/l mild hand tremor L>R    LABS:                      15.0    (    94.8   12.21 )-----------( ---------      167      ( 08 Feb 2024 06:37 )             41.9    (    11.9     RADIOLOGY & ADDITIONAL TESTS:    MEDICATIONS:    acetaminophen     Tablet .. 650 milliGRAM(s) Oral every 6 hours  allopurinol 200 milliGRAM(s) Oral daily  amLODIPine   Tablet 10 milliGRAM(s) Oral daily  aspirin  chewable 81 milliGRAM(s) Oral daily  benzonatate 100 milliGRAM(s) Oral every 8 hours  carbidopa/levodopa  25/100 2 Tablet(s) Oral <User Schedule>  carbidopa/levodopa CR 50/200 1 Tablet(s) Oral <User Schedule>  carvedilol 3.125 milliGRAM(s) Oral every 12 hours  chlorhexidine 2% Cloths 1 Application(s) Topical daily  enoxaparin Injectable 40 milliGRAM(s) SubCutaneous every 24 hours  entacapone 200 milliGRAM(s) Oral three times a day  lactulose Syrup 20 Gram(s) Oral every 2 hours PRN  levETIRAcetam 500 milliGRAM(s) Oral two times a day  lidocaine   4% Patch 1 Patch Transdermal daily  meclizine 12.5 milliGRAM(s) Oral two times a day  methocarbamol 750 milliGRAM(s) Oral every 8 hours  polyethylene glycol 3350 17 Gram(s) Oral every 24 hours  rivastigmine patch  9.5 mG/24 Hr(s) 1 Patch Transdermal every 24 hours  senna 2 Tablet(s) Oral at bedtime  zaleplon 5 milliGRAM(s) Oral at bedtime PRN

## 2024-02-09 NOTE — PROGRESS NOTE ADULT - ASSESSMENT
Patient is a 72 y/o man with PMHx of Parkinson's disease (diagnosed 7 years ago, HTN, 2 x CVA (1998, 2016), DM, Seizure (2019) on keppra presenting to the ED after 2 x falls. 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 He has a brilliant mind. He has never smoked, drank or used and drugs in his entire life. He has lead a truly remarkable life. Pt was on tour in [place] and fell on stage at end of show and needed asst getting up. He then flew to [place], but could not get out of plane. Pt was hospitalized and this was the begin of his Parkinson's.    # COVID infection; thrombocytopenia; mild cough  quarantine 1/31-2/9 (d/c iso on 2/10)  had symp w/ fever and RA down to 88% at times; remains off O2 and RA sat 97%  I do NOT believe this is cause of leg weakness; but certainly is cause of gen fatigue and tiredness  CXR unremarkable  ID consult noted  plt are usually nl (as of 6/2023) -> plt got better on their own; ok to use DVT ppx  completed RDV x5 days (2/2-2/6)  completed Dexa 6mg po q24 (2/2-2/7) (prefer to use < 10 days to avoid steroid myopathy; also getting some mild steroid-induced hyperglycemia in BMP)  can make tessalon 100mg po q8 prn cough    # Fall 2/2 suspected akinesia from Parkinson's disease; Hx of Seizure (2019); hx vertigo  orthostatic hypotension neg  no syncope; no vertigo   seen by ENT 06/2023: MR head negative, orthostatic vital signs negative (06/2023)  Trauma w/u negative for acute traumatic injury.   neuro feels he is having morning freeze episodes 2/2 wearing off of sinemet because of lack of long-acting version at bedtime  make sinemet 25/100 2 tab at 7am, 12 pm and 5pm  make sinemet ER 50/200 1 tab HS (10pm) - this is helping  c/w entacapone, rivastigmine    PT eval - walked 40' w/ asst; pt walked slowly and gets tired easily  pt accepted to [facility]: [phone]: for MON 2/12 (need PT f/u noted on 2/10 or 2/11)  fall precautions  c/w meclizine 12.5 q12    # Hx of CVA (1996, 2016)   CT H noted: stable mod chr microvasc changes and chr lacunar infarcts  c/w asa 81 q24  not on statin - LDL 80    # hx Sz  on keppra 500mg BID    # lumbar back pain after fall  no trauma seen on CT A/P  no further imaging needed for now  no further w/u  PT eval  lido patch top q24  tyl 650mg po q6 standing  robaxin 750mg po q8  tramadol 50mg po q8 prn     # HTN  decr carvedilol 3.125mg q12 (bradycardia)  incr norvasc 10mg po q24  might need 3rd drug  d/c IVFs    # hx gout - pt says this has not been too bad recently  c/w allopurinol 200mg po q24  uric acid 4.9    # Insomnia   on zaleplon 5mg hs prn sleep    # constipation  lactulose prn  PEG q24 + senna 2 hs    # Apical lung mass- incidental finding  CT chest (01/31/2024): 1.8 cm left apical lung mass, suspicious for malignancy-new from neck CT of 6/1/2023 were the lung apices were imaged. Indeterminate right pulmonary nodular opacities.   Pulm consult: noted  agree, no reason to bx inpt, vita w/ COVID +; do outpt PET scan and plan for outpt bx after COVID past 2 wks.    # Colonic lesion vs adherent stool  there is no rush to eval lesions that could simply be stool  outpt GI consult for colonoscopy once pt is well and past COVID quarantine    # rt renal calc and 8mm bladder calc; rt renal cyst; hx urine incontinence   outpt uro eval    # asymp GB stones  no further intervention    # hx of obesity; s/p lap band in past, which became infected at one point and needed surgery to fix it    # GI prophylaxis: None    # DVT prophylaxis: lovenox 40 q24    # Activity: PT; amb w/ asst; use walker     # Code Status: Full code    # Shanon ELLSWORTH ([phone]): left message today    Dispo: PT f/u 2/10 or 2/11; tx back pain; tx COVID; d/c quarantine on 2/10; tx BP; LIMIT LABS  eventually, pt will need STR @ [facility] - f/u CM - anticipate d/c Mon 2/12  outpt pulm f/u and PET scan; outpt uro f/u re stones; outpt GI eval for c-scope; outpt neuro f/u re PD; outpt IM f/u

## 2024-02-10 PROCEDURE — 99232 SBSQ HOSP IP/OBS MODERATE 35: CPT

## 2024-02-10 RX ADMIN — RIVASTIGMINE 1 PATCH: 4.6 PATCH, EXTENDED RELEASE TRANSDERMAL at 20:23

## 2024-02-10 RX ADMIN — Medication 650 MILLIGRAM(S): at 06:30

## 2024-02-10 RX ADMIN — CARVEDILOL PHOSPHATE 3.12 MILLIGRAM(S): 80 CAPSULE, EXTENDED RELEASE ORAL at 17:08

## 2024-02-10 RX ADMIN — ENTACAPONE 200 MILLIGRAM(S): 200 TABLET, FILM COATED ORAL at 21:26

## 2024-02-10 RX ADMIN — CARBIDOPA AND LEVODOPA 1 TABLET(S): 25; 100 TABLET ORAL at 21:26

## 2024-02-10 RX ADMIN — CARVEDILOL PHOSPHATE 3.12 MILLIGRAM(S): 80 CAPSULE, EXTENDED RELEASE ORAL at 06:14

## 2024-02-10 RX ADMIN — Medication 100 MILLIGRAM(S): at 21:28

## 2024-02-10 RX ADMIN — Medication 81 MILLIGRAM(S): at 11:44

## 2024-02-10 RX ADMIN — POLYETHYLENE GLYCOL 3350 17 GRAM(S): 17 POWDER, FOR SOLUTION ORAL at 12:44

## 2024-02-10 RX ADMIN — Medication 650 MILLIGRAM(S): at 06:13

## 2024-02-10 RX ADMIN — SENNA PLUS 2 TABLET(S): 8.6 TABLET ORAL at 21:26

## 2024-02-10 RX ADMIN — ENOXAPARIN SODIUM 40 MILLIGRAM(S): 100 INJECTION SUBCUTANEOUS at 06:13

## 2024-02-10 RX ADMIN — LIDOCAINE 1 PATCH: 4 CREAM TOPICAL at 23:37

## 2024-02-10 RX ADMIN — METHOCARBAMOL 750 MILLIGRAM(S): 500 TABLET, FILM COATED ORAL at 13:52

## 2024-02-10 RX ADMIN — ENTACAPONE 200 MILLIGRAM(S): 200 TABLET, FILM COATED ORAL at 13:52

## 2024-02-10 RX ADMIN — RIVASTIGMINE 1 PATCH: 4.6 PATCH, EXTENDED RELEASE TRANSDERMAL at 08:36

## 2024-02-10 RX ADMIN — Medication 650 MILLIGRAM(S): at 12:00

## 2024-02-10 RX ADMIN — CARBIDOPA AND LEVODOPA 2 TABLET(S): 25; 100 TABLET ORAL at 11:44

## 2024-02-10 RX ADMIN — Medication 650 MILLIGRAM(S): at 18:23

## 2024-02-10 RX ADMIN — LEVETIRACETAM 500 MILLIGRAM(S): 250 TABLET, FILM COATED ORAL at 17:08

## 2024-02-10 RX ADMIN — LEVETIRACETAM 500 MILLIGRAM(S): 250 TABLET, FILM COATED ORAL at 06:13

## 2024-02-10 RX ADMIN — LIDOCAINE 1 PATCH: 4 CREAM TOPICAL at 12:45

## 2024-02-10 RX ADMIN — Medication 12.5 MILLIGRAM(S): at 17:08

## 2024-02-10 RX ADMIN — Medication 650 MILLIGRAM(S): at 11:44

## 2024-02-10 RX ADMIN — Medication 200 MILLIGRAM(S): at 11:44

## 2024-02-10 RX ADMIN — CHLORHEXIDINE GLUCONATE 1 APPLICATION(S): 213 SOLUTION TOPICAL at 11:54

## 2024-02-10 RX ADMIN — LIDOCAINE 1 PATCH: 4 CREAM TOPICAL at 20:22

## 2024-02-10 RX ADMIN — Medication 100 MILLIGRAM(S): at 13:52

## 2024-02-10 RX ADMIN — CARBIDOPA AND LEVODOPA 2 TABLET(S): 25; 100 TABLET ORAL at 17:08

## 2024-02-10 RX ADMIN — CARBIDOPA AND LEVODOPA 2 TABLET(S): 25; 100 TABLET ORAL at 06:14

## 2024-02-10 RX ADMIN — METHOCARBAMOL 750 MILLIGRAM(S): 500 TABLET, FILM COATED ORAL at 21:27

## 2024-02-10 RX ADMIN — Medication 650 MILLIGRAM(S): at 17:08

## 2024-02-10 RX ADMIN — ENTACAPONE 200 MILLIGRAM(S): 200 TABLET, FILM COATED ORAL at 06:13

## 2024-02-10 RX ADMIN — Medication 650 MILLIGRAM(S): at 00:00

## 2024-02-10 RX ADMIN — Medication 12.5 MILLIGRAM(S): at 06:13

## 2024-02-10 RX ADMIN — RIVASTIGMINE 1 PATCH: 4.6 PATCH, EXTENDED RELEASE TRANSDERMAL at 11:47

## 2024-02-10 RX ADMIN — METHOCARBAMOL 750 MILLIGRAM(S): 500 TABLET, FILM COATED ORAL at 06:15

## 2024-02-10 RX ADMIN — Medication 100 MILLIGRAM(S): at 06:13

## 2024-02-10 RX ADMIN — AMLODIPINE BESYLATE 10 MILLIGRAM(S): 2.5 TABLET ORAL at 06:13

## 2024-02-10 NOTE — PROGRESS NOTE ADULT - ASSESSMENT
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 He has a brilliant mind. He has never smoked, drank or used and drugs in his entire life. He has lead a truly remarkable life. [place] [facility] and fell on stage at end of show and needed asst getting up. He then flew to [place], but could not get out of plane. Pt was hospitalized and this was the begin of his Parkinson's.    # COVID infection; thrombocytopenia; mild cough  quarantine 1/31-2/9 (d/c iso on 2/10)  had symp w/ fever and RA down to 88% at times; remains off O2 and RA sat 97%  I do NOT believe this is cause of leg weakness; but certainly is cause of gen fatigue and tiredness  CXR unremarkable  ID consult noted  plt are usually nl (as of 6/2023) -> plt got better on their own; ok to use DVT ppx  completed RDV x5 days (2/2-2/6)  completed Dexa 6mg po q24 (2/2-2/7) (prefer to use < 10 days to avoid steroid myopathy; also getting some mild steroid-induced hyperglycemia in BMP)  can make tessalon 100mg po q8 prn cough    # Fall 2/2 suspected akinesia from Parkinson's disease; Hx of Seizure (2019); hx vertigo  orthostatic hypotension neg  no syncope; no vertigo   seen by ENT 06/2023: MR head negative, orthostatic vital signs negative (06/2023)  Trauma w/u negative for acute traumatic injury.   neuro feels he is having morning freeze episodes 2/2 wearing off of sinemet because of lack of long-acting version at bedtime  make sinemet 25/100 2 tab at 7am, 12 pm and 5pm  make sinemet ER 50/200 1 tab HS (10pm) - this is helping  c/w entacapone, rivastigmine    PT eval - walked 40' w/ asst; pt walked slowly and gets tired easily  pt accepted to [facility]: [phone]: for MON 2/12 (need PT f/u noted on 2/10 or 2/11)  fall precautions  c/w meclizine 12.5 q12    # Hx of CVA (1996, 2016)   CT H noted: stable mod chr microvasc changes and chr lacunar infarcts  c/w asa 81 q24  not on statin - LDL 80    # hx Sz  on keppra 500mg BID    # lumbar back pain after fall  no trauma seen on CT A/P  no further imaging needed for now  no further w/u  PT eval  lido patch top q24  tyl 650mg po q6 standing  robaxin 750mg po q8  tramadol 50mg po q8 prn     # HTN  decr carvedilol 3.125mg q12 (bradycardia)  incr norvasc 10mg po q24  might need 3rd drug  d/c IVFs    # hx gout - pt says this has not been too bad recently  c/w allopurinol 200mg po q24  uric acid 4.9    # Insomnia   on zaleplon 5mg hs prn sleep    # constipation  lactulose prn  PEG q24 + senna 2 hs    # Apical lung mass- incidental finding  CT chest (01/31/2024): 1.8 cm left apical lung mass, suspicious for malignancy-new from neck CT of 6/1/2023 were the lung apices were imaged. Indeterminate right pulmonary nodular opacities.   Pulm consult: noted  agree, no reason to bx inpt, vita w/ COVID +; do outpt PET scan and plan for outpt bx after COVID past 2 wks.    # Colonic lesion vs adherent stool  there is no rush to eval lesions that could simply be stool  outpt GI consult for colonoscopy once pt is well and past COVID quarantine    # rt renal calc and 8mm bladder calc; rt renal cyst; hx urine incontinence   outpt uro eval    # asymp GB stones  no further intervention    # hx of obesity; s/p lap band in past, which became infected at one point and needed surgery to fix it    # GI prophylaxis: None    # DVT prophylaxis: lovenox 40 q24    # Activity: PT; amb w/ asst; use walker     # Code Status: Full code    # Shanon ELLSWORTH ([phone]): left message today    Dispo: PT f/u 2/10 or 2/11; tx back pain; tx COVID; d/c quarantine on 2/10; tx BP; LIMIT LABS  eventually, pt will need STR @ [facility] - f/u CM - anticipate d/c Mon 2/12  outpt pulm f/u and PET scan; outpt uro f/u re stones; outpt GI eval for c-scope; outpt neuro f/u re PD; outpt IM f/u

## 2024-02-10 NOTE — PROGRESS NOTE ADULT - SUBJECTIVE AND OBJECTIVE BOX
SUBJECTIVE:    Patient is a 73y old Male who presents with a chief complaint of Fall (09 Feb 2024 19:47)    Currently admitted to medicine with the primary diagnosis of Fall       Today is hospital day 10d. This morning he is resting comfortably in bed and reports no new issues or overnight events.     PAST MEDICAL & SURGICAL HISTORY  Parkinson disease    CVA (cerebral vascular accident)    HTN (hypertension)    Diabetes mellitus    Gout    History of appendectomy      SOCIAL HISTORY:  Negative for smoking/alcohol/drug use.     ALLERGIES:  sulfa drugs (Unknown)  penicillin (Unknown)  cefaclor (Unknown)    MEDICATIONS:  STANDING MEDICATIONS  acetaminophen     Tablet .. 650 milliGRAM(s) Oral every 6 hours  allopurinol 200 milliGRAM(s) Oral daily  amLODIPine   Tablet 10 milliGRAM(s) Oral daily  aspirin  chewable 81 milliGRAM(s) Oral daily  benzonatate 100 milliGRAM(s) Oral every 8 hours  carbidopa/levodopa  25/100 2 Tablet(s) Oral <User Schedule>  carbidopa/levodopa CR 50/200 1 Tablet(s) Oral <User Schedule>  carvedilol 3.125 milliGRAM(s) Oral every 12 hours  chlorhexidine 2% Cloths 1 Application(s) Topical daily  enoxaparin Injectable 40 milliGRAM(s) SubCutaneous every 24 hours  entacapone 200 milliGRAM(s) Oral three times a day  levETIRAcetam 500 milliGRAM(s) Oral two times a day  lidocaine   4% Patch 1 Patch Transdermal daily  meclizine 12.5 milliGRAM(s) Oral two times a day  methocarbamol 750 milliGRAM(s) Oral every 8 hours  polyethylene glycol 3350 17 Gram(s) Oral every 24 hours  rivastigmine patch  9.5 mG/24 Hr(s) 1 Patch Transdermal every 24 hours  senna 2 Tablet(s) Oral at bedtime    PRN MEDICATIONS  lactulose Syrup 20 Gram(s) Oral every 2 hours PRN  zaleplon 5 milliGRAM(s) Oral at bedtime PRN    VITALS:   T(F): 96.5  HR: 66  BP: 132/81  RR: 18  SpO2: 93%    PHYSICAL EXAM:  GEN: No acute distress  LUNGS: Clear to auscultation bilaterally   HEART: S1/S2 present.   ABD: Soft, non-tender, non-distended. Bowel sounds present        LABS:                            RADIOLOGY:

## 2024-02-11 LAB
GLUCOSE BLDC GLUCOMTR-MCNC: 131 MG/DL — HIGH (ref 70–99)
GLUCOSE BLDC GLUCOMTR-MCNC: 134 MG/DL — HIGH (ref 70–99)
GLUCOSE BLDC GLUCOMTR-MCNC: 141 MG/DL — HIGH (ref 70–99)
GLUCOSE BLDC GLUCOMTR-MCNC: 99 MG/DL — SIGNIFICANT CHANGE UP (ref 70–99)

## 2024-02-11 PROCEDURE — 99232 SBSQ HOSP IP/OBS MODERATE 35: CPT

## 2024-02-11 RX ORDER — LANOLIN ALCOHOL/MO/W.PET/CERES
5 CREAM (GRAM) TOPICAL AT BEDTIME
Refills: 0 | Status: DISCONTINUED | OUTPATIENT
Start: 2024-02-11 | End: 2024-02-12

## 2024-02-11 RX ADMIN — Medication 12.5 MILLIGRAM(S): at 05:14

## 2024-02-11 RX ADMIN — LIDOCAINE 1 PATCH: 4 CREAM TOPICAL at 20:13

## 2024-02-11 RX ADMIN — CARVEDILOL PHOSPHATE 3.12 MILLIGRAM(S): 80 CAPSULE, EXTENDED RELEASE ORAL at 17:54

## 2024-02-11 RX ADMIN — METHOCARBAMOL 750 MILLIGRAM(S): 500 TABLET, FILM COATED ORAL at 13:59

## 2024-02-11 RX ADMIN — ENOXAPARIN SODIUM 40 MILLIGRAM(S): 100 INJECTION SUBCUTANEOUS at 05:12

## 2024-02-11 RX ADMIN — ENTACAPONE 200 MILLIGRAM(S): 200 TABLET, FILM COATED ORAL at 05:13

## 2024-02-11 RX ADMIN — METHOCARBAMOL 750 MILLIGRAM(S): 500 TABLET, FILM COATED ORAL at 05:13

## 2024-02-11 RX ADMIN — CARBIDOPA AND LEVODOPA 1 TABLET(S): 25; 100 TABLET ORAL at 21:07

## 2024-02-11 RX ADMIN — CARVEDILOL PHOSPHATE 3.12 MILLIGRAM(S): 80 CAPSULE, EXTENDED RELEASE ORAL at 05:13

## 2024-02-11 RX ADMIN — POLYETHYLENE GLYCOL 3350 17 GRAM(S): 17 POWDER, FOR SOLUTION ORAL at 11:50

## 2024-02-11 RX ADMIN — Medication 650 MILLIGRAM(S): at 07:04

## 2024-02-11 RX ADMIN — Medication 100 MILLIGRAM(S): at 05:12

## 2024-02-11 RX ADMIN — Medication 100 MILLIGRAM(S): at 13:58

## 2024-02-11 RX ADMIN — Medication 650 MILLIGRAM(S): at 12:30

## 2024-02-11 RX ADMIN — ENTACAPONE 200 MILLIGRAM(S): 200 TABLET, FILM COATED ORAL at 21:08

## 2024-02-11 RX ADMIN — RIVASTIGMINE 1 PATCH: 4.6 PATCH, EXTENDED RELEASE TRANSDERMAL at 08:00

## 2024-02-11 RX ADMIN — CARBIDOPA AND LEVODOPA 2 TABLET(S): 25; 100 TABLET ORAL at 11:48

## 2024-02-11 RX ADMIN — SENNA PLUS 2 TABLET(S): 8.6 TABLET ORAL at 21:07

## 2024-02-11 RX ADMIN — Medication 100 MILLIGRAM(S): at 21:07

## 2024-02-11 RX ADMIN — RIVASTIGMINE 1 PATCH: 4.6 PATCH, EXTENDED RELEASE TRANSDERMAL at 20:13

## 2024-02-11 RX ADMIN — LEVETIRACETAM 500 MILLIGRAM(S): 250 TABLET, FILM COATED ORAL at 05:14

## 2024-02-11 RX ADMIN — Medication 81 MILLIGRAM(S): at 11:48

## 2024-02-11 RX ADMIN — RIVASTIGMINE 1 PATCH: 4.6 PATCH, EXTENDED RELEASE TRANSDERMAL at 11:48

## 2024-02-11 RX ADMIN — CARBIDOPA AND LEVODOPA 2 TABLET(S): 25; 100 TABLET ORAL at 17:56

## 2024-02-11 RX ADMIN — METHOCARBAMOL 750 MILLIGRAM(S): 500 TABLET, FILM COATED ORAL at 21:08

## 2024-02-11 RX ADMIN — LIDOCAINE 1 PATCH: 4 CREAM TOPICAL at 12:03

## 2024-02-11 RX ADMIN — Medication 650 MILLIGRAM(S): at 11:49

## 2024-02-11 RX ADMIN — ENTACAPONE 200 MILLIGRAM(S): 200 TABLET, FILM COATED ORAL at 13:58

## 2024-02-11 RX ADMIN — Medication 650 MILLIGRAM(S): at 17:55

## 2024-02-11 RX ADMIN — Medication 200 MILLIGRAM(S): at 11:48

## 2024-02-11 RX ADMIN — Medication 650 MILLIGRAM(S): at 05:13

## 2024-02-11 RX ADMIN — LEVETIRACETAM 500 MILLIGRAM(S): 250 TABLET, FILM COATED ORAL at 17:54

## 2024-02-11 RX ADMIN — Medication 12.5 MILLIGRAM(S): at 17:56

## 2024-02-11 RX ADMIN — CARBIDOPA AND LEVODOPA 2 TABLET(S): 25; 100 TABLET ORAL at 05:13

## 2024-02-11 RX ADMIN — AMLODIPINE BESYLATE 10 MILLIGRAM(S): 2.5 TABLET ORAL at 05:13

## 2024-02-11 NOTE — PROGRESS NOTE ADULT - PROVIDER SPECIALTY LIST ADULT
Hospitalist
Internal Medicine
Infectious Disease
Internal Medicine

## 2024-02-11 NOTE — PROGRESS NOTE ADULT - ASSESSMENT
Patient is a 72 y/o man with PMHx of Parkinson's disease (diagnosed 7 years ago, HTN, 2 x CVA (1998, 2016), DM, Seizure (2019) on keppra presenting to the ED after 2 x falls. (of note, patient was admitted for same complaints in 09/22/2022)    # COVID infection; thrombocytopenia  - Quarantine 1/31-2/9, d/c isolation on 2/10/24.  - Not on oxygen  - RDV 5-day course last dose 2/6  - Likely the cause of fatigue  - CT chest 1/31 w/ IV: 1.8 cm left apical lung mass, suspicious for malignancy-new from neck CT of 6/1/2023 were the lung apices were imaged. Recommend oncologic evaluation. Pulm 2/1 --> outpatient PET/CT and PFTs.  Platelets improving  Dexa 6mg po q24 stopped on 2/7 due to clinical improvement    # Fall 2/2 suspected akinesia from Parkinson's disease; Hx of Seizure (2019); hx vertigo  - orthostatic hypotension neg  - no syncope; no vertigo   - seen by ENT 06/2023: MR head negative, orthostatic vital signs negative (06/2023)  - Trauma w/u negative for acute traumatic injury.   - neuro feels he is having morning freeze episodes 2/2 wearing off of sinemet because of lack of long-acting version at bedtime  - sinemet 25/100 2 tab at 7am, 12 pm and 5pm  - sinemet ER 50/200 1 tab HS (10pm) - this is helping  - c/w entacapone, rivastigmine    - PT eval 2/7: 45 feet;  weight-bearing as tolerated   No AD, Hand held assist;  contact guard;  1 person assist;  verbal cues  - neuro requesting pt go for rehab at [facility]: [phone]  - fall precautions  - c/w meclizine 12.5 q12  - Family is looking for new neurologist, can provide information to movement specialist - Dr. Nicholas or Dr. Garnica after discharge    # Hx of CVA (1996, 2016)   - CT H noted: stable mod chr microvasc changes and chr lacunar infarcts  - c/w asa 81 q24  - not on statin - LDL 80    # hx Sz  - on keppra 500mg BID    # lumbar back pain after fall - improving  no trauma seen on CT A/P  no further imaging needed for now  no further w/u  PT eval 2/7: 45 feet;  weight-bearing as tolerated   No AD, Hand held assist;  contact guard;  1 person assist;  verbal cues  lido patch top q24  tyl 975mg po q8 standing  robaxin 750mg po q6  tramadol 50mg po q8 prn (do not raise dose)    # hypomagnesemia  - Replete PRN    # HTN  c/w carvedilol 12.5 q12 --> reduced to 3.125 dose due to bradycardia  amlodipine increased to 10mg qd    # hx gout - pt says this has not been too bad recently  c/w allopurinol 200mg po q24  uric acid 4.9    # Insomnia   - Neurology recommends using zaleplon due to reduced risk of fall    # Apical lung mass- incidental finding  CT chest (01/31/2024): 1.8 cm left apical lung mass, suspicious for malignancy-new from neck CT of 6/1/2023 were the lung apices were imaged. Indeterminate right pulmonary nodular opacities.   Pulm consult 2/1 --> outpatient PET/CT and PFTs    # Colonic lesion vs adherent stool  - GI consult requested on 2/5 per attending  - 2/6 fellow note states will coordinate outpatient colonoscopy with Dr. Nelson.    # Constipation  - Senna 2 tabs qhs, Miralax q24h, lactulose PRN     # hx of obesity; s/p lap band in past, which became infected at one point and needed surgery to fix it    # asymp GB stones  no further intervention    # rt renal calc and 8mm bladder calc; rt renal cyst; hx urine incontinence   outpt uro eval    # GI prophylaxis: None    # DVT prophylaxis: Lovenox 40 q24    # Activity: need PT for back pain; WBAT;     # Code Status: Full code    # Shanon ELLSWORTH ([phone]):     Dispo: COVID quarantine through 2/9; dc on 2/12/24 to [facility].

## 2024-02-11 NOTE — PROGRESS NOTE ADULT - SUBJECTIVE AND OBJECTIVE BOX
24H events:    Patient is a 73y old Male who presents with a chief complaint of Fall (10 Feb 2024 10:33)    Primary diagnosis of Fall    Today is hospital day 11d. This morning patient was seen and examined at bedside, resting comfortably in bed.    No acute or major events overnight.    Code Status:  Full    PAST MEDICAL & SURGICAL HISTORY  Parkinson disease    CVA (cerebral vascular accident)    HTN (hypertension)    Diabetes mellitus    Gout    History of appendectomy      SOCIAL HISTORY:  Social History:  Denies EtOH, tobacco, illicit drug use. (31 Jan 2024 20:52)      ALLERGIES:  sulfa drugs (Unknown)  penicillin (Unknown)  cefaclor (Unknown)    MEDICATIONS:  STANDING MEDICATIONS  acetaminophen     Tablet .. 650 milliGRAM(s) Oral every 6 hours  allopurinol 200 milliGRAM(s) Oral daily  amLODIPine   Tablet 10 milliGRAM(s) Oral daily  aspirin  chewable 81 milliGRAM(s) Oral daily  benzonatate 100 milliGRAM(s) Oral every 8 hours  carbidopa/levodopa  25/100 2 Tablet(s) Oral <User Schedule>  carbidopa/levodopa CR 50/200 1 Tablet(s) Oral <User Schedule>  carvedilol 3.125 milliGRAM(s) Oral every 12 hours  chlorhexidine 2% Cloths 1 Application(s) Topical daily  enoxaparin Injectable 40 milliGRAM(s) SubCutaneous every 24 hours  entacapone 200 milliGRAM(s) Oral three times a day  levETIRAcetam 500 milliGRAM(s) Oral two times a day  lidocaine   4% Patch 1 Patch Transdermal daily  meclizine 12.5 milliGRAM(s) Oral two times a day  methocarbamol 750 milliGRAM(s) Oral every 8 hours  polyethylene glycol 3350 17 Gram(s) Oral every 24 hours  rivastigmine patch  9.5 mG/24 Hr(s) 1 Patch Transdermal every 24 hours  senna 2 Tablet(s) Oral at bedtime    PRN MEDICATIONS  lactulose Syrup 20 Gram(s) Oral every 2 hours PRN  zaleplon 5 milliGRAM(s) Oral at bedtime PRN    VITALS:   T(F): 97  HR: 67  BP: 112/65  RR: 18  SpO2: 95%    PHYSICAL EXAM:  GENERAL:   ( X ) NAD, lying in bed comfortably     (  ) obtunded     (  ) lethargic     (  ) somnolent    HEAD:   ( X ) Atraumatic     (  ) hematoma     (  ) laceration (specify location:       )     NECK:  ( X ) Supple     (  ) neck stiffness     (  ) nuchal rigidity     (  )  no JVD     (  ) JVD present ( -- cm)    HEART:  Rate -->     ( X ) normal rate     (  ) bradycardic     (  ) tachycardic  Rhythm -->     ( X ) regular     (  ) regularly irregular     (  ) irregularly irregular  Murmurs -->     ( X ) normal s1s2     (  ) systolic murmur     (  ) diastolic murmur     (  ) continuous murmur      (  ) S3 present     (  ) S4 present    LUNGS:   ( X ) Unlabored respirations     (  ) tachypnea  ( X ) B/L air entry     (  ) decreased breath sounds in:  (location     )    ( X ) no adventitious sound     (  ) crackles     (  ) wheezing      (  ) rhonchi      (specify location:       )  (  ) chest wall tenderness (specify location:       )    ABDOMEN:   ( X ) Soft     (  ) tense   |   ( X ) nondistended     (  ) distended   |   (  ) +BS     (  ) hypoactive bowel sounds     (  ) hyperactive bowel sounds  ( X ) nontender     (  ) RUQ tenderness     (  ) RLQ tenderness     (  ) LLQ tenderness     (  ) epigastric tenderness     (  ) diffuse tenderness  (  ) Splenomegaly      (  ) Hepatomegaly      (  ) Jaundice     (  ) ecchymosis     EXTREMITIES:  ( X ) Normal     (  ) Rash     (  ) ecchymosis     (  ) varicose veins      (  ) pitting edema     (  ) non-pitting edema   (  ) ulceration     (  ) gangrene:     (location:     )    NERVOUS SYSTEM:    ( X ) A&Ox3     (  ) confused     (  ) lethargic  CN II-XII:     ( X ) Intact     (  ) deficits found     (Specify:     )   Upper extremities:     ( X ) no sensorimotor deficits     (  ) weakness     (  ) loss of proprioception/vibration     (  ) loss of touch/temperature (specify:    )  Lower extremities:     ( X ) no sensorimotor deficits     (  ) weakness     (  ) loss of proprioception/vibration     (  ) loss of touch/temperature (specify:    )

## 2024-02-12 ENCOUNTER — TRANSCRIPTION ENCOUNTER (OUTPATIENT)
Age: 74
End: 2024-02-12

## 2024-02-12 ENCOUNTER — INPATIENT (INPATIENT)
Facility: HOSPITAL | Age: 74
LOS: 14 days | Discharge: HOME | DRG: 56 | End: 2024-02-27
Attending: PSYCHIATRY & NEUROLOGY | Admitting: PSYCHIATRY & NEUROLOGY
Payer: MEDICARE

## 2024-02-12 ENCOUNTER — NON-APPOINTMENT (OUTPATIENT)
Age: 74
End: 2024-02-12

## 2024-02-12 VITALS
HEART RATE: 71 BPM | TEMPERATURE: 99 F | WEIGHT: 147.05 LBS | OXYGEN SATURATION: 96 % | HEIGHT: 68 IN | DIASTOLIC BLOOD PRESSURE: 84 MMHG | SYSTOLIC BLOOD PRESSURE: 144 MMHG | RESPIRATION RATE: 16 BRPM

## 2024-02-12 VITALS — RESPIRATION RATE: 18 BRPM | OXYGEN SATURATION: 95 %

## 2024-02-12 DIAGNOSIS — Z90.49 ACQUIRED ABSENCE OF OTHER SPECIFIED PARTS OF DIGESTIVE TRACT: Chronic | ICD-10-CM

## 2024-02-12 DIAGNOSIS — G20.A1 PARKINSON'S DISEASE WITHOUT DYSKINESIA, WITHOUT MENTION OF FLUCTUATIONS: ICD-10-CM

## 2024-02-12 LAB
ALBUMIN SERPL ELPH-MCNC: 3.2 G/DL — LOW (ref 3.5–5.2)
ALP SERPL-CCNC: 86 U/L — SIGNIFICANT CHANGE UP (ref 30–115)
ALT FLD-CCNC: 11 U/L — SIGNIFICANT CHANGE UP (ref 0–41)
ANION GAP SERPL CALC-SCNC: 13 MMOL/L — SIGNIFICANT CHANGE UP (ref 7–14)
AST SERPL-CCNC: 26 U/L — SIGNIFICANT CHANGE UP (ref 0–41)
BASOPHILS # BLD AUTO: 0.05 K/UL — SIGNIFICANT CHANGE UP (ref 0–0.2)
BASOPHILS NFR BLD AUTO: 0.4 % — SIGNIFICANT CHANGE UP (ref 0–1)
BILIRUB SERPL-MCNC: 0.5 MG/DL — SIGNIFICANT CHANGE UP (ref 0.2–1.2)
BUN SERPL-MCNC: 15 MG/DL — SIGNIFICANT CHANGE UP (ref 10–20)
CALCIUM SERPL-MCNC: 8.4 MG/DL — SIGNIFICANT CHANGE UP (ref 8.4–10.5)
CHLORIDE SERPL-SCNC: 104 MMOL/L — SIGNIFICANT CHANGE UP (ref 98–110)
CO2 SERPL-SCNC: 22 MMOL/L — SIGNIFICANT CHANGE UP (ref 17–32)
CREAT SERPL-MCNC: 0.8 MG/DL — SIGNIFICANT CHANGE UP (ref 0.7–1.5)
EGFR: 95 ML/MIN/1.73M2 — SIGNIFICANT CHANGE UP
EOSINOPHIL # BLD AUTO: 0.12 K/UL — SIGNIFICANT CHANGE UP (ref 0–0.7)
EOSINOPHIL NFR BLD AUTO: 1 % — SIGNIFICANT CHANGE UP (ref 0–8)
FLUAV AG NPH QL: SIGNIFICANT CHANGE UP
FLUBV AG NPH QL: SIGNIFICANT CHANGE UP
GLUCOSE SERPL-MCNC: 135 MG/DL — HIGH (ref 70–99)
HCT VFR BLD CALC: 41.5 % — LOW (ref 42–52)
HGB BLD-MCNC: 14.8 G/DL — SIGNIFICANT CHANGE UP (ref 14–18)
IMM GRANULOCYTES NFR BLD AUTO: 1 % — HIGH (ref 0.1–0.3)
LYMPHOCYTES # BLD AUTO: 2.32 K/UL — SIGNIFICANT CHANGE UP (ref 1.2–3.4)
LYMPHOCYTES # BLD AUTO: 20.2 % — LOW (ref 20.5–51.1)
MCHC RBC-ENTMCNC: 34.3 PG — HIGH (ref 27–31)
MCHC RBC-ENTMCNC: 35.7 G/DL — SIGNIFICANT CHANGE UP (ref 32–37)
MCV RBC AUTO: 96.3 FL — HIGH (ref 80–94)
MONOCYTES # BLD AUTO: 0.84 K/UL — HIGH (ref 0.1–0.6)
MONOCYTES NFR BLD AUTO: 7.3 % — SIGNIFICANT CHANGE UP (ref 1.7–9.3)
NEUTROPHILS # BLD AUTO: 8.05 K/UL — HIGH (ref 1.4–6.5)
NEUTROPHILS NFR BLD AUTO: 70.1 % — SIGNIFICANT CHANGE UP (ref 42.2–75.2)
NRBC # BLD: 0 /100 WBCS — SIGNIFICANT CHANGE UP (ref 0–0)
PLATELET # BLD AUTO: 182 K/UL — SIGNIFICANT CHANGE UP (ref 130–400)
PMV BLD: 9.4 FL — SIGNIFICANT CHANGE UP (ref 7.4–10.4)
POTASSIUM SERPL-MCNC: 4.1 MMOL/L — SIGNIFICANT CHANGE UP (ref 3.5–5)
POTASSIUM SERPL-SCNC: 4.1 MMOL/L — SIGNIFICANT CHANGE UP (ref 3.5–5)
PROT SERPL-MCNC: 5.7 G/DL — LOW (ref 6–8)
RBC # BLD: 4.31 M/UL — LOW (ref 4.7–6.1)
RBC # FLD: 12.2 % — SIGNIFICANT CHANGE UP (ref 11.5–14.5)
RSV RNA NPH QL NAA+NON-PROBE: SIGNIFICANT CHANGE UP
SARS-COV-2 RNA SPEC QL NAA+PROBE: DETECTED
SODIUM SERPL-SCNC: 139 MMOL/L — SIGNIFICANT CHANGE UP (ref 135–146)
WBC # BLD: 11.5 K/UL — HIGH (ref 4.8–10.8)
WBC # FLD AUTO: 11.5 K/UL — HIGH (ref 4.8–10.8)

## 2024-02-12 PROCEDURE — 99223 1ST HOSP IP/OBS HIGH 75: CPT

## 2024-02-12 PROCEDURE — 99239 HOSP IP/OBS DSCHRG MGMT >30: CPT

## 2024-02-12 RX ORDER — ENOXAPARIN SODIUM 100 MG/ML
40 INJECTION SUBCUTANEOUS EVERY 24 HOURS
Refills: 0 | Status: DISCONTINUED | OUTPATIENT
Start: 2024-02-12 | End: 2024-02-27

## 2024-02-12 RX ORDER — ESZOPICLONE 2 MG/1
1 TABLET, COATED ORAL
Qty: 0 | Refills: 0 | DISCHARGE

## 2024-02-12 RX ORDER — ZOLPIDEM TARTRATE 10 MG/1
5 TABLET ORAL AT BEDTIME
Refills: 0 | Status: DISCONTINUED | OUTPATIENT
Start: 2024-02-12 | End: 2024-02-13

## 2024-02-12 RX ORDER — MECLIZINE HCL 12.5 MG
12.5 TABLET ORAL
Refills: 0 | Status: DISCONTINUED | OUTPATIENT
Start: 2024-02-12 | End: 2024-02-13

## 2024-02-12 RX ORDER — TRAZODONE HCL 50 MG
100 TABLET ORAL AT BEDTIME
Refills: 0 | Status: DISCONTINUED | OUTPATIENT
Start: 2024-02-12 | End: 2024-02-27

## 2024-02-12 RX ORDER — CARBIDOPA AND LEVODOPA 25; 100 MG/1; MG/1
2 TABLET ORAL
Qty: 0 | Refills: 0 | DISCHARGE

## 2024-02-12 RX ORDER — POLYETHYLENE GLYCOL 3350 17 G/17G
17 POWDER, FOR SOLUTION ORAL EVERY 24 HOURS
Refills: 0 | Status: DISCONTINUED | OUTPATIENT
Start: 2024-02-12 | End: 2024-02-15

## 2024-02-12 RX ORDER — CARBIDOPA AND LEVODOPA 25; 100 MG/1; MG/1
2 TABLET ORAL
Qty: 180 | Refills: 0
Start: 2024-02-12 | End: 2024-03-12

## 2024-02-12 RX ORDER — ACETAMINOPHEN 500 MG
650 TABLET ORAL EVERY 6 HOURS
Refills: 0 | Status: DISCONTINUED | OUTPATIENT
Start: 2024-02-12 | End: 2024-02-27

## 2024-02-12 RX ORDER — CARBIDOPA AND LEVODOPA 25; 100 MG/1; MG/1
2 TABLET ORAL
Refills: 0 | Status: DISCONTINUED | OUTPATIENT
Start: 2024-02-12 | End: 2024-02-27

## 2024-02-12 RX ORDER — LIDOCAINE 4 G/100G
1 CREAM TOPICAL DAILY
Refills: 0 | Status: DISCONTINUED | OUTPATIENT
Start: 2024-02-12 | End: 2024-02-27

## 2024-02-12 RX ORDER — AMLODIPINE BESYLATE 2.5 MG/1
1 TABLET ORAL
Qty: 30 | Refills: 0
Start: 2024-02-12 | End: 2024-03-12

## 2024-02-12 RX ORDER — ZALEPLON 10 MG
1 CAPSULE ORAL
Qty: 0 | Refills: 0 | DISCHARGE
Start: 2024-02-12

## 2024-02-12 RX ORDER — METHOCARBAMOL 500 MG/1
1 TABLET, FILM COATED ORAL
Qty: 90 | Refills: 0
Start: 2024-02-12 | End: 2024-03-12

## 2024-02-12 RX ORDER — ZALEPLON 10 MG
5 CAPSULE ORAL AT BEDTIME
Refills: 0 | Status: DISCONTINUED | OUTPATIENT
Start: 2024-02-12 | End: 2024-02-12

## 2024-02-12 RX ORDER — ASPIRIN/CALCIUM CARB/MAGNESIUM 324 MG
81 TABLET ORAL DAILY
Refills: 0 | Status: DISCONTINUED | OUTPATIENT
Start: 2024-02-12 | End: 2024-02-27

## 2024-02-12 RX ORDER — LACTULOSE 10 G/15ML
20 SOLUTION ORAL DAILY
Refills: 0 | Status: DISCONTINUED | OUTPATIENT
Start: 2024-02-12 | End: 2024-02-15

## 2024-02-12 RX ORDER — CARBIDOPA AND LEVODOPA 25; 100 MG/1; MG/1
1 TABLET ORAL
Refills: 0 | Status: DISCONTINUED | OUTPATIENT
Start: 2024-02-12 | End: 2024-02-27

## 2024-02-12 RX ORDER — CARBIDOPA AND LEVODOPA 25; 100 MG/1; MG/1
1 TABLET ORAL
Qty: 30 | Refills: 0
Start: 2024-02-12 | End: 2024-03-12

## 2024-02-12 RX ORDER — AMLODIPINE BESYLATE 2.5 MG/1
10 TABLET ORAL DAILY
Refills: 0 | Status: DISCONTINUED | OUTPATIENT
Start: 2024-02-12 | End: 2024-02-14

## 2024-02-12 RX ORDER — CARVEDILOL PHOSPHATE 80 MG/1
3.12 CAPSULE, EXTENDED RELEASE ORAL EVERY 12 HOURS
Refills: 0 | Status: DISCONTINUED | OUTPATIENT
Start: 2024-02-12 | End: 2024-02-27

## 2024-02-12 RX ORDER — METHOCARBAMOL 500 MG/1
750 TABLET, FILM COATED ORAL EVERY 8 HOURS
Refills: 0 | Status: DISCONTINUED | OUTPATIENT
Start: 2024-02-12 | End: 2024-02-13

## 2024-02-12 RX ORDER — LANOLIN ALCOHOL/MO/W.PET/CERES
6 CREAM (GRAM) TOPICAL AT BEDTIME
Refills: 0 | Status: DISCONTINUED | OUTPATIENT
Start: 2024-02-12 | End: 2024-02-27

## 2024-02-12 RX ORDER — ALLOPURINOL 300 MG
200 TABLET ORAL DAILY
Refills: 0 | Status: DISCONTINUED | OUTPATIENT
Start: 2024-02-12 | End: 2024-02-27

## 2024-02-12 RX ORDER — ENTACAPONE 200 MG/1
200 TABLET, FILM COATED ORAL
Refills: 0 | Status: DISCONTINUED | OUTPATIENT
Start: 2024-02-12 | End: 2024-02-27

## 2024-02-12 RX ORDER — RIVASTIGMINE 4.6 MG/24H
1 PATCH, EXTENDED RELEASE TRANSDERMAL EVERY 24 HOURS
Refills: 0 | Status: DISCONTINUED | OUTPATIENT
Start: 2024-02-12 | End: 2024-02-27

## 2024-02-12 RX ORDER — SENNA PLUS 8.6 MG/1
2 TABLET ORAL AT BEDTIME
Refills: 0 | Status: DISCONTINUED | OUTPATIENT
Start: 2024-02-12 | End: 2024-02-27

## 2024-02-12 RX ORDER — ALLOPURINOL 300 MG
1 TABLET ORAL
Qty: 30 | Refills: 0
Start: 2024-02-12 | End: 2024-03-12

## 2024-02-12 RX ORDER — CARVEDILOL PHOSPHATE 80 MG/1
1 CAPSULE, EXTENDED RELEASE ORAL
Qty: 60 | Refills: 0
Start: 2024-02-12 | End: 2024-03-12

## 2024-02-12 RX ORDER — LEVETIRACETAM 250 MG/1
500 TABLET, FILM COATED ORAL
Refills: 0 | Status: DISCONTINUED | OUTPATIENT
Start: 2024-02-12 | End: 2024-02-27

## 2024-02-12 RX ADMIN — CHLORHEXIDINE GLUCONATE 1 APPLICATION(S): 213 SOLUTION TOPICAL at 11:19

## 2024-02-12 RX ADMIN — RIVASTIGMINE 1 PATCH: 4.6 PATCH, EXTENDED RELEASE TRANSDERMAL at 18:12

## 2024-02-12 RX ADMIN — Medication 12.5 MILLIGRAM(S): at 05:36

## 2024-02-12 RX ADMIN — LEVETIRACETAM 500 MILLIGRAM(S): 250 TABLET, FILM COATED ORAL at 05:36

## 2024-02-12 RX ADMIN — Medication 650 MILLIGRAM(S): at 05:37

## 2024-02-12 RX ADMIN — AMLODIPINE BESYLATE 10 MILLIGRAM(S): 2.5 TABLET ORAL at 05:37

## 2024-02-12 RX ADMIN — ENOXAPARIN SODIUM 40 MILLIGRAM(S): 100 INJECTION SUBCUTANEOUS at 05:36

## 2024-02-12 RX ADMIN — POLYETHYLENE GLYCOL 3350 17 GRAM(S): 17 POWDER, FOR SOLUTION ORAL at 18:16

## 2024-02-12 RX ADMIN — Medication 650 MILLIGRAM(S): at 06:45

## 2024-02-12 RX ADMIN — ENOXAPARIN SODIUM 40 MILLIGRAM(S): 100 INJECTION SUBCUTANEOUS at 18:14

## 2024-02-12 RX ADMIN — CARBIDOPA AND LEVODOPA 2 TABLET(S): 25; 100 TABLET ORAL at 11:18

## 2024-02-12 RX ADMIN — LIDOCAINE 1 PATCH: 4 CREAM TOPICAL at 20:55

## 2024-02-12 RX ADMIN — LIDOCAINE 1 PATCH: 4 CREAM TOPICAL at 00:01

## 2024-02-12 RX ADMIN — ZOLPIDEM TARTRATE 5 MILLIGRAM(S): 10 TABLET ORAL at 20:59

## 2024-02-12 RX ADMIN — Medication 650 MILLIGRAM(S): at 11:48

## 2024-02-12 RX ADMIN — ENTACAPONE 200 MILLIGRAM(S): 200 TABLET, FILM COATED ORAL at 05:37

## 2024-02-12 RX ADMIN — RIVASTIGMINE 1 PATCH: 4.6 PATCH, EXTENDED RELEASE TRANSDERMAL at 10:17

## 2024-02-12 RX ADMIN — CARBIDOPA AND LEVODOPA 2 TABLET(S): 25; 100 TABLET ORAL at 05:36

## 2024-02-12 RX ADMIN — Medication 100 MILLIGRAM(S): at 20:59

## 2024-02-12 RX ADMIN — CARVEDILOL PHOSPHATE 3.12 MILLIGRAM(S): 80 CAPSULE, EXTENDED RELEASE ORAL at 18:15

## 2024-02-12 RX ADMIN — ENTACAPONE 200 MILLIGRAM(S): 200 TABLET, FILM COATED ORAL at 18:12

## 2024-02-12 RX ADMIN — LEVETIRACETAM 500 MILLIGRAM(S): 250 TABLET, FILM COATED ORAL at 18:14

## 2024-02-12 RX ADMIN — Medication 100 MILLIGRAM(S): at 05:37

## 2024-02-12 RX ADMIN — RIVASTIGMINE 1 PATCH: 4.6 PATCH, EXTENDED RELEASE TRANSDERMAL at 11:32

## 2024-02-12 RX ADMIN — Medication 6 MILLIGRAM(S): at 20:58

## 2024-02-12 RX ADMIN — LIDOCAINE 1 PATCH: 4 CREAM TOPICAL at 11:18

## 2024-02-12 RX ADMIN — CARBIDOPA AND LEVODOPA 1 TABLET(S): 25; 100 TABLET ORAL at 21:46

## 2024-02-12 RX ADMIN — LIDOCAINE 1 PATCH: 4 CREAM TOPICAL at 18:15

## 2024-02-12 RX ADMIN — CARVEDILOL PHOSPHATE 3.12 MILLIGRAM(S): 80 CAPSULE, EXTENDED RELEASE ORAL at 05:37

## 2024-02-12 RX ADMIN — POLYETHYLENE GLYCOL 3350 17 GRAM(S): 17 POWDER, FOR SOLUTION ORAL at 11:18

## 2024-02-12 RX ADMIN — METHOCARBAMOL 750 MILLIGRAM(S): 500 TABLET, FILM COATED ORAL at 05:36

## 2024-02-12 RX ADMIN — Medication 650 MILLIGRAM(S): at 11:18

## 2024-02-12 RX ADMIN — Medication 200 MILLIGRAM(S): at 11:19

## 2024-02-12 RX ADMIN — Medication 81 MILLIGRAM(S): at 11:19

## 2024-02-12 RX ADMIN — RIVASTIGMINE 1 PATCH: 4.6 PATCH, EXTENDED RELEASE TRANSDERMAL at 20:55

## 2024-02-12 RX ADMIN — CARBIDOPA AND LEVODOPA 2 TABLET(S): 25; 100 TABLET ORAL at 17:15

## 2024-02-12 NOTE — PATIENT PROFILE ADULT - FALL HARM RISK - HARM RISK INTERVENTIONS

## 2024-02-12 NOTE — DISCHARGE NOTE PROVIDER - NSDCMRMEDTOKEN_GEN_ALL_CORE_FT
aspirin 81 mg oral delayed release tablet: 1 tab(s) orally once a day  carbidopa-levodopa 25 mg-100 mg oral tablet: 2 tab(s) orally 3 times a day  entacapone 200 mg oral tablet: 1 tab(s) orally 4 times a day with sinemet  levETIRAcetam 500 mg oral tablet: 1 tab(s) orally 2 times a day  Lunesta 1 mg oral tablet: 1 tab(s) orally once a day (at bedtime)  meclizine 12.5 mg oral tablet: 1 tab(s) orally 2 times a day  rivastigmine 9.5 mg/24 hr transdermal film, extended release: 1 patch transdermal every 24 hours  traZODone 100 mg oral tablet: 1 tab(s) orally once a day   allopurinol 200 mg oral tablet: 1 tab(s) orally once a day  amLODIPine 10 mg oral tablet: 1 tab(s) orally once a day  aspirin 81 mg oral delayed release tablet: 1 tab(s) orally once a day  carbidopa-levodopa 25 mg-100 mg oral tablet: 2 tab(s) orally 3 times a day Take at 7am, 12pm, and 5pm daily  carbidopa-levodopa 50 mg-200 mg oral tablet, extended release: 1 tab(s) orally once a day (at bedtime) Take daily at bedtime (10pm)  carvedilol 3.125 mg oral tablet: 1 tab(s) orally every 12 hours  entacapone 200 mg oral tablet: 1 tab(s) orally 4 times a day with sinemet  levETIRAcetam 500 mg oral tablet: 1 tab(s) orally 2 times a day  meclizine 12.5 mg oral tablet: 1 tab(s) orally 2 times a day  methocarbamol 750 mg oral tablet: 1 tab(s) orally every 8 hours  rivastigmine 9.5 mg/24 hr transdermal film, extended release: 1 patch transdermal every 24 hours  traZODone 100 mg oral tablet: 1 tab(s) orally once a day  zaleplon 5 mg oral capsule: 1 cap(s) orally once a day (at bedtime) As needed Insomnia

## 2024-02-12 NOTE — H&P ADULT - ATTENDING COMMENTS
I have personally seen and examined the patient with the resident. Medical records reviewed. I have made amendments to the documentation where necessary and adjusted the history, physical examination, and plan as documented by the resident.     Moderately advanced Parkinson's Disease with poorly controlled motor and non- motor symptoms and multiple functional deficits   Resolving COVID related encephalopathy   Admit to PD specific rehabilitation program  Functional scales   Resume all medications  Admit labs  Medicine input  Movement Disorder Neurology evaluation  Fall and aspiration precautions

## 2024-02-12 NOTE — DISCHARGE NOTE PROVIDER - NSDCFUADDAPPT_GEN_ALL_CORE_FT
follow up with your PCP and specilaists as scheduled  follow up with your neurologist as scheduled.   in case of new or worsening symptoms go to ED

## 2024-02-12 NOTE — DISCHARGE NOTE PROVIDER - PROVIDER TOKENS
PROVIDER:[TOKEN:[[id number]],FOLLOWUP:[2 weeks]],PROVIDER:[TOKEN:[[id number]],FOLLOWUP:[2 weeks]],PROVIDER:[TOKEN:[[id number]],FOLLOWUP:[2 weeks]],PROVIDER:[TOKEN:[[id number]],FOLLOWUP:[1 month]]

## 2024-02-12 NOTE — DISCHARGE NOTE PROVIDER - CARE PROVIDER_API CALL
Ha Tejada  Internal Medicine  [address] [facility], [address]  [place] [address]  Phone: [phone]  Fax: [phone]  Follow Up Time: 2 weeks    Axel Nicholas  Neurology  [address]  [place] [address]  Phone: [phone]  Fax: [phone]  Follow Up Time: 2 weeks    Lina Dempsey  Pulmonary Disease  [address] [place] [address], [address]  [place] [address]  Phone: [phone]  Fax: [phone]  Follow Up Time: 2 weeks    Sam Leon  Gastroenterology  [facility] [place] [address]  Phone: [phone]  Fax: [phone]  Follow Up Time: 1 month

## 2024-02-12 NOTE — DISCHARGE NOTE PROVIDER - ATTENDING DISCHARGE PHYSICAL EXAMINATION:
Patient seen at bedside. Wating for placement. as per CMavailable today. patient will be discharged today patient to follow up with PCP and specialists as scheduled. patient ok with discharge planning. verbalized understood and agreed with discharge planning.

## 2024-02-12 NOTE — DISCHARGE NOTE PROVIDER - HOSPITAL COURSE
74 y/o male with PMH of Parkinson's disease (diagnosed  7 years ago, HTN, 2 x CVA (1998, 2016), DM, Seizure (2019) on keppra presenting to the ED after 2 x falls. (of note, patient was admitted for same complaints in 09/22/2022). Trauma w/u was negative for acute traumatic injury. (CT chest, CT abdomen/pelvis, CT c-spine, CT head, xray pelvis, xray chest). Further workup for fall negative. Neurology evaluated the patient and feels that he is having morning freeze episodes secondary to wearing off of sinemet because of lack of long-acting version at bedtime; Sinemet regimen changed with improvement (sinemet 25/100 2 tab at 7am, 12 pm and 5pm; sinemet ER 50/200 1 tab HS (10pm)). Patient will require follow-up with neurology - can see movement specialist    Hospital course complicated by:     1. COVID. Patient completed 5-day course of RDV on 2/6.   2. CT chest showed incidental finding of 1.8 cm left apical lung mass, suspicious for malignancy-new from neck CT of 6/1/2023. Will require outpatient pulmonology follow-up for PET/CT and PFTs  3. CT abdomen showed: 2 rounded foci in the transverse colon. Differential includes adherent stool versus colonic lesions. Will require outpatient colonoscopy with Dr. Nelson.     Patient stable for discharge to rehab center.         72 y/o male with PMH of Parkinson's disease (diagnosed  7 years ago, HTN, 2 x CVA (1998, 2016), DM, Seizure (2019) on keppra presenting to the ED after 2 x falls. (of note, patient was admitted for same complaints in 09/22/2022). Trauma w/u was negative for acute traumatic injury. (CT chest, CT abdomen/pelvis, CT c-spine, CT head, xray pelvis, xray chest). Further workup for fall negative. Neurology evaluated the patient and feels that he is having morning freeze episodes secondary to wearing off of sinemet because of lack of long-acting version at bedtime; Sinemet regimen changed with improvement (sinemet 25/100 2 tab at 7am, 12 pm and 5pm; sinemet ER 50/200 1 tab HS (10pm)). Patient will require follow-up with neurology - can see movement specialist    Hospital course complicated by:     1. COVID. Patient completed 5-day course of RDV on 2/6.   2. CT chest showed incidental finding of 1.8 cm left apical lung mass, suspicious for malignancy-new from neck CT of 6/1/2023. Will require outpatient pulmonology follow-up for PET/CT and PFTs  3. CT abdomen showed: 2 rounded foci in the transverse colon. Differential includes adherent stool versus colonic lesions. Will require outpatient colonoscopy with Dr. Nelson.     Patient stable for discharge to rehab center. was waiting for placement. available today

## 2024-02-12 NOTE — DISCHARGE NOTE PROVIDER - CARE PROVIDERS DIRECT ADDRESSES
[email or web address],[email or web address]
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
## 2024-02-12 NOTE — H&P ADULT - NSHPLABSRESULTS_GEN_ALL_CORE
14.8   11.50 )-----------( 182      ( 12 Feb 2024 11:53 )             41.5     RADIOLOGY, EKG & ADDITIONAL TESTS:   CT Chest +Abdomen and Pelvis w/ IV Cont (01.31.24 @ 14:11) >  1.8 cm left apical lung mass, suspicious for malignancy-new from neck CT of 6/1/2023 were the lung apices were imaged. Recommend oncologic evaluation.  Indeterminate right pulmonary nodular opacities. Recommend follow-up.  2 rounded foci in the transverse colon. Differential includes adherent   stool versus colonic lesions. Recommend follow-up with colonoscopy.  No CT evidence of acute/traumatic injury in the chest abdomen or pelvis    CT Cervical Spine No Cont (01.31.24 @ 14:04) >  CT HEAD: No acute intracranial findings.  Stable moderate chronic microvascular ischemic changes and chronic lacunar infarcts.  CT CERVICAL SPINE: No acute cervical fracture or facet subluxation.    Xray Pelvis AP only (01.31.24 @ 13:06) >  No acute fracture or acute articular abnormality. .    Xray Chest 1 View AP/PA (01.31.24 @ 13:05) >  No radiographic evidence of acute cardiopulmonary disease.

## 2024-02-12 NOTE — H&P ADULT - NSHPSOCIALHISTORY_GEN_ALL_CORE
SOCIAL HISTORY  Smoking - Denied  EtOH - Denied   Drugs - Denied    FUNCTIONAL HISTORY  Lives alone in [place], no stairs  Prior Level of Function: Independent in ADLs and ambulation    CURRENT FUNCTIONAL STATUS  - Bed Mobility: SV  - Transfers: SV  - Gait: 50' x 2 CGA  - ADLs: CGA toilet transfer/shower transfer/UBD/toileting, min A bathing, SV grooming, independent with eating SOCIAL HISTORY  Smoking - Denied  EtOH - Denied   Drugs - Denied    FUNCTIONAL HISTORY  Lives alone in [place], no stairs.  Prior Level of Function: Independent in ADLs and ambulation w/o AD. Receives assistance only for household cleaning. Ergs 3x/wk at PT gym. 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 "I feel fortunate to love all kinds of music out there."      CURRENT FUNCTIONAL STATUS  - Bed Mobility: SV  - Transfers: SV  - Gait: 50' x 2 CGA  - ADLs: CGA toilet transfer/shower transfer/UBD/toileting, min A bathing, SV grooming, independent with eating

## 2024-02-12 NOTE — H&P ADULT - NSHPPHYSICALEXAM_GEN_ALL_CORE
Constitutional - NAD, comfortable  HEENT - NCAT, anicteric, no nystagmus  Neck - supple  Chest - normal effort on room air, CTAB   Cardio - RRR, no murmur/rub/gallop  Abdomen - nondistended, soft, nontender  Extremities - no peripheral edema, no calf tenderness   Neurologic Exam:                    Cognitive -             Orientation: alert, oriented to self/place/date/situation            Attention:  days of week intact, MUSIC backward: C-I- stopped, delayed recall 1/3 (+0 w/ categories, +1 w/ multiple choice)            Memory: recent/remote memory intact            Thought: process and content appropriate     Speech - fluent, comprehensible, no dysarthria, occasional word-finding difficulty     Cranial Nerves - PERRLA, VFF, EOMI, no facial weakness, + R tongue deviation, shoulder shrug intact     Motor -  LUE > RUE muscle bulk, L hand tremor                    LEFT    UE - SA 5/5, EF 5/5, EE 5/5, WE 5/5,  5/5                    RIGHT UE - SA 5/5, EF 5-/5, EE 5-/5, WE 5-/5,  5/5                    LEFT    LE - HF 4+/5, KE 5/5, DF 5/5, PF 5/5                    RIGHT LE - HF 4+/5, KE 5/5, DF 5/5, PF 5/5        Sensory - intact to LT throughout     Reflexes - b/l biceps 1+, b/l patellar 3+, neg b/l Neal's, neg b/l clonus     Coordination - b/l FTN intact  Psychiatric - intermittently tearful (feels incongruent)  Skin on admission: no pressure injury Constitutional - NAD, comfortable  HEENT - NCAT, anicteric, no nystagmus  Neck - supple  Chest - normal effort on room air, CTAB   Cardio - RRR, no murmur/rub/gallop  Abdomen - nondistended, soft, nontender  Extremities - no peripheral edema, no calf tenderness   Neurologic Exam:                    Cognitive -             Orientation: alert, oriented to self/place/date/situation            Attention:  days of week intact, MUSIC backward: C-I- stopped, delayed recall 1/3 (+0 w/ categories, +1 w/ multiple choice)            Memory: recent/remote memory intact            Thought: process and content appropriate     Speech - fluent, comprehensible, no dysarthria, occasional word-finding difficulty     Cranial Nerves - PERRLA, VFF, EOMI, no facial weakness, hypophonia, no dysphagia.      Motor -  LUE > RUE muscle bulk, L hand  resting tremor                    LEFT    UE - SA 5/5, EF 5/5, EE 5/5, WE 5/5,  5/5                    RIGHT UE - SA 5/5, EF 5-/5, EE 5-/5, WE 5-/5,  5/5                    LEFT    LE - HF 4+/5, KE 5/5, DF 5/5, PF 5/5                    RIGHT LE - HF 4+/5, KE 5/5, DF 5/5, PF 5/5     Mild rigidity and bradykinesia       Sensory - intact to LT throughout     Reflexes - b/l biceps 1+, b/l patellar 3+, neg b/l Neal's, neg b/l clonus     Coordination - b/l FTN intact  Psychiatric - intermittently tearful (feels incongruent)  Skin on admission: no pressure injury

## 2024-02-12 NOTE — DISCHARGE NOTE NURSING/CASE MANAGEMENT/SOCIAL WORK - PATIENT PORTAL LINK FT
You can access the FollowMyHealth Patient Portal offered by [facility] by registering at the following website: [email or web address]. By joining [employer]’s FollowMyHealth portal, you will also be able to view your health information using other applications (apps) compatible with our system.

## 2024-02-12 NOTE — OCCUPATIONAL THERAPY INITIAL EVALUATION ADULT - GENERAL OBSERVATIONS, REHAB EVAL
Pt encountered semi-reclined in bed in NAD, no c/o pain. Agreeable to OT heather, seen 1:10-1:25pm  RN aware

## 2024-02-12 NOTE — DISCHARGE NOTE NURSING/CASE MANAGEMENT/SOCIAL WORK - NSDCVIVACCINE_GEN_ALL_CORE_FT
Tdap; 12-May-2020 22:10; Angelina Gomez (RN); [employer]; [id number] (Exp. Date: 19-Apr-2021); IntraMuscular; Deltoid Right.; 0.5 milliLiter(s); VIS (VIS Published: 09-May-2013, VIS Presented: 12-May-2020);

## 2024-02-12 NOTE — H&P ADULT - HISTORY OF PRESENT ILLNESS
72 y/o male with PMH of Parkinson's disease (diagnosed 7 years ago), HTN, 2 x CVA (1998, 2016), seizure (2019 - on keppra), presented to [facility] ED on 1/31 after 2 falls. (of note, patient was admitted for same complaints in 09/22/2022). Trauma w/u was negative for acute traumatic injury. (CT chest, CT abdomen/pelvis, CT c-spine, CT head, xray pelvis, xray chest). Further workup for fall negative. Neurology evaluated the patient and feels that he is having morning freeze episodes secondary to wearing off of sinemet because of lack of long-acting version at bedtime; Sinemet regimen changed with improvement (sinemet 25/100 2 tab at 7am, 12 pm and 5pm; sinemet ER 50/200 1 tab HS (10pm)).     Hospital course complicated by:  -COVID positive on admission  - completed RDV x 5 Day.    - Incidental find of 1.8 cm left apical lung mass, suspicious for malignancy-new from neck CT of 6/1/2023. Will require outpatient pulmonology follow-up for PET/CT and PFTs  - CT abdomen showed: 2 rounded foci in the transverse colon. Differential includes adherent stool versus colonic lesions. Will require outpatient colonoscopy with Dr. Nelson.    Patient was evaluated by PM&R and therapy for functional deficits and gait/ADL impairments and recommend acute rehabilitation.  Patient was medically optimized for discharge to [facility] on 2/12/2023 "Nehemias" is a 72 y/o male with PMH of Parkinson's disease (diagnosed 7 years ago), HTN, 2 x CVA (1998, 2016, minimal residual), seizure (2019 - on keppra), presented to [facility] ED on 1/31 after 2 falls. (of note, patient was admitted for same complaints in 09/22/2022). Trauma w/u was negative for acute traumatic injury. (CT chest, CT abdomen/pelvis, CT c-spine, CT head, xray pelvis, xray chest). Further workup for fall negative. Neurology evaluated the patient and feels that he is having morning freeze episodes secondary to wearing off of sinemet because of lack of long-acting version at bedtime; Sinemet regimen changed with improvement (sinemet 25/100 2 tab at 7am, 12 pm and 5pm; sinemet ER 50/200 1 tab HS (10pm)).     Hospital course complicated by:  - COVID positive on admission  - completed RDV x 5 Day.    - Incidental find of 1.8 cm left apical lung mass, suspicious for malignancy-new from neck CT of 6/1/2023. Will require outpatient pulmonology follow-up for PET/CT and PFTs  - CT abdomen showed: 2 rounded foci in the transverse colon. Differential includes adherent stool versus colonic lesions. Will require outpatient colonoscopy with Dr. Nelson.    Patient was evaluated by PM&R and therapy for functional deficits and gait/ADL impairments and recommend acute rehabilitation.  Patient was medically optimized for discharge to [place] on 2/12/2023

## 2024-02-12 NOTE — H&P ADULT - NSHPREVIEWOFSYSTEMS_GEN_ALL_CORE
REVIEW OF SYSTEMS  Constitutional: no fever, no chills, no fatigue  HEENT: no visual disturbances, + chronic progressive hearing loss, + mild vertigo (residual from sudden onset 8wks ago), no tinnitus, no ear pain  Resp: + improving cough, no shortness of breath  Cardio: no chest pain, no palpitations  GI:  no abdominal pain, no nausea, no vomiting, no diarrhea, no constipation  : no dysuria, no frequency, no hematuria  Neuro: no headache, + memory loss, + loss of strength, no numbness, + chronic R tremor, + chronic word-finding difficulty  Skin: no itching, no rash   MSK: no joint pain, no joint swelling, no muscle pain, no neck pain, no back pain  Psych:  no depression, no anxiety, no insomnia

## 2024-02-12 NOTE — OCCUPATIONAL THERAPY INITIAL EVALUATION ADULT - LIVES WITH, PROFILE
Pt with confusion as to prior living situation. As per chart, Pt living in assisted living facility, no stairs to enter/alone

## 2024-02-12 NOTE — H&P ADULT - ASSESSMENT
ASSESSMENT/PLAN  72 y/o male with PMH of Parkinson's disease (diagnosed 7 years ago), HTN, 2 x CVA (1998, 2016), seizure (2019 - on keppra), presented to [facility] ED on 1/31 after 2 falls. Fall w/u negative - attribute fall to PD regimen,    #Parkinson's Disease now with gait Instability, ADL impairments and Functional impairments  - Start Comprehensive Rehab Program of PT/OT/SLP    ------------------------------------------------------  Parkinson's related motor symptoms    #Dyskinesia/Rigidity/Bradykinesia  - Sinemet 25/100 2 tabs 7am, 12pm, 5pm  - Entacapone 200 QID  - Sinemet ER 50/200 1 tab HS  - Rivastigmine 9.5mg trandermal   -Movement disorders following    --------------------------------------------------------  Parkinson's related non-motor symptoms    #Orthostatic hypotension  -Monitor OH vital signs  #Dizziness may be OH related??  - Meclizine 12.5 BID PRN    #Mood/Sleep  - Trazodone 100  - Sonata 5mg PRN  - Recreation Therapy  - Neuropsych consult    #Pain control  - Tylenol PRN  - Muscle relaxant: Methocarbamol 750 TID PRN    #GI/Bowel Management/Constipation  - Senna at bedtime, Miralax QD  - Lactulose PRN    #Bladder management  - Continue to monitor PVR q 8 hours (SC if > 400)  -Monitor UO    ----------------------------------------------------------  Concurrent medical problems    #Prior CVA  - ASA 81  - previously on atorvastatin 40 (now off since 1/2023) - LDL 80    #Seizure  - Keppra 500 BID    #HTN  - Amlodipine 10  - Carvedilol 3.125 BID    #history of Diabetes per chart  - will check A1C  - FS has been <150  - Monitor glucose via lab    #Gout  - Allopurinol 200    #COVID positive (1/31) - completed isolation  - completed RDV x 5 Day    #Incidental finds:  - 1.8 cm left apical lung mass, suspicious for malignancy-new from neck CT of 6/1/2023. ****Outpatient pulmonology follow-up for PET/CT and PFTs  - 2 rounded foci in the transverse colon. Differential includes adherent stool versus colonic lesions.****outpatient colonoscopy with Dr. Nelson.    #DVT Prophylaxis  - Lovenox  - SCDs  - TEDs     #Skin:  -No active issues at this time    FEN   - Diet - Regular + Thins  - Dysphagia  SLP - evaluation and treatment    Precautions / PROPHYLAXIS:   - Falls, Cardiac, Seizure   - ortho: Weight bearing status: WBAT   - Lungs: Aspiration, Incentive Spirometer   - Pressure injury/Skin: Turn Q2hrs while in bed, OOB to Chair, PT/OT      MEDICAL PROGNOSIS: GOOD              REHAB POTENTIAL: GOOD              ESTIMATED DISPOSITION: HOME WITH HOME CARE              ELOS: 10-14 Days   EXPECTED THERAPY:     P.T. 1hr/day       O.T. 1hr/day      S.L.P. 1hr/day       P&O Unnecessary       EXP FREQUENCY: 5 days per 7 day period     PRESCREEN COMPARISON:   I have reviewed the prescreen information and I have found no relevant changes between the preadmission screening and my post admission evaluation     RATIONALE FOR INPATIENT ADMISSION - Patient demonstrates the following: (check all that apply)  [X] Medically appropriate for rehabilitation admission  [X] Has attainable rehab goals with an appropriate initial discharge plan  [X] Has rehabilitation potential (expected to make a significant improvement within a reasonable period of time)   [X] Requires close medical management by a rehab physician, rehab nursing care, Hospitalist and comprehensive interdisciplinary team (including PT, OT, & or SLP, Prosthetics and Orthotics)   ASSESSMENT/PLAN  74 y/o male with PMH of Parkinson's disease (diagnosed 7 years ago), HTN, 2 x CVA (1998, 2016), seizure (2019 - on keppra), presented to [facility] ED on 1/31 after 2 falls. Fall w/u negative - attribute fall to PD regimen,    #Parkinson's Disease now with gait Instability, ADL impairments and Functional impairments  - Start Comprehensive Rehab Program of PT/OT/SLP    ------------------------------------------------------  Parkinson's related motor symptoms    #Dyskinesia/Rigidity/Bradykinesia  - Sinemet 25/100 2 tabs 7am, 12pm, 5pm  - Entacapone 200 QID  - Sinemet ER 50/200 1 tab HS  - Rivastigmine 9.5mg trandermal   -Movement disorders following    --------------------------------------------------------  Parkinson's related non-motor symptoms    #Orthostatic hypotension  -Monitor OH vital signs  #Dizziness may be OH related??  - Meclizine 12.5 BID PRN    #Sleep/Mood  - Trazodone 100  - Melatonin 6mg HS  - Sonata 5mg PRN  - Recreation Therapy  - Neuropsych consult    #Pain control  - Tylenol PRN, Lidocaine patch   - Muscle relaxant: Methocarbamol 750 TID PRN    #GI/Bowel Management/Constipation  - Senna at bedtime, Miralax QD  - Lactulose PRN    #Bladder management  - Continue to monitor PVR q 8 hours (SC if > 400)  - Monitor UO    ----------------------------------------------------------  Concurrent medical problems    #Prior CVA  - ASA 81  - previously on atorvastatin 40 (now off since 1/2023) - LDL 80    #Seizure  - Keppra 500 BID    #HTN  - Amlodipine 10  - Carvedilol 3.125 BID    #history of Diabetes per chart  - will check A1C  - FS has been <150  - Monitor glucose via lab    #Gout  - Allopurinol 200    #COVID positive (1/31) - completed isolation  - completed RDV x 5 Day    #Incidental finds:  - 1.8 cm left apical lung mass, suspicious for malignancy-new from neck CT of 6/1/2023. ****Outpatient pulmonology follow-up for PET/CT and PFTs  - 2 rounded foci in the transverse colon. Differential includes adherent stool versus colonic lesions.****outpatient colonoscopy with Dr. Nelson.    #DVT Prophylaxis  - Lovenox  - SCDs  - TEDs     #Skin:  -No active issues at this time    FEN   - Diet - Regular + Thins  - Dysphagia  SLP - evaluation and treatment    Precautions / PROPHYLAXIS:   - Falls, Cardiac, Seizure   - ortho: Weight bearing status: WBAT   - Lungs: Aspiration, Incentive Spirometer   - Pressure injury/Skin: Turn Q2hrs while in bed, OOB to Chair, PT/OT      MEDICAL PROGNOSIS: GOOD              REHAB POTENTIAL: GOOD              ESTIMATED DISPOSITION: HOME WITH HOME CARE              ELOS: 10-14 Days   EXPECTED THERAPY:     P.T. 1hr/day       O.T. 1hr/day      S.L.P. 1hr/day       P&O Unnecessary       EXP FREQUENCY: 5 days per 7 day period     PRESCREEN COMPARISON:   I have reviewed the prescreen information and I have found no relevant changes between the preadmission screening and my post admission evaluation     RATIONALE FOR INPATIENT ADMISSION - Patient demonstrates the following: (check all that apply)  [X] Medically appropriate for rehabilitation admission  [X] Has attainable rehab goals with an appropriate initial discharge plan  [X] Has rehabilitation potential (expected to make a significant improvement within a reasonable period of time)   [X] Requires close medical management by a rehab physician, rehab nursing care, Hospitalist and comprehensive interdisciplinary team (including PT, OT, & or SLP, Prosthetics and Orthotics)   ASSESSMENT/PLAN  72 y/o male with PMH of Parkinson's disease (diagnosed 7 years ago), HTN, 2 x CVA (1998, 2016), seizure (2019 - on keppra), presented to [facility] ED on 1/31 after 2 falls. Fall w/u negative - attribute fall to PD regimen,    #Parkinson's Disease now with gait Instability, ADL impairments and Functional impairments  - Start Comprehensive Rehab Program of PT/OT/SLP    ------------------------------------------------------  Parkinson's related motor symptoms    #Dyskinesia/Rigidity/Bradykinesia  - Sinemet 25/100 2 tabs 7am, 12pm, 5pm  - Entacapone 200 QID  - Sinemet ER 50/200 1 tab HS  - Rivastigmine 9.5mg transdermal   - Movement disorders following    --------------------------------------------------------  Parkinson's related non-motor symptoms    #Orthostatic hypotension  -Monitor OH vital signs  #Dizziness may be OH related??  - Meclizine 12.5 BID PRN    #Sleep/Mood  - Trazodone 100  - Melatonin 6mg HS  - Sonata 5mg PRN  - Recreation Therapy  - Neuropsych consult    #Pain control  - Tylenol PRN, Lidocaine patch   - Muscle relaxant: Methocarbamol 750 TID PRN    #GI/Bowel Management/Constipation  - Senna at bedtime, Miralax QD  - Lactulose PRN    #Bladder management  - Continue to monitor PVR q 8 hours (SC if > 400)  - Monitor UO    ----------------------------------------------------------  Concurrent medical problems    #Prior CVA  - ASA 81  - previously on atorvastatin 40 (now off since 1/2023) - LDL 80    #Seizure  - Keppra 500 BID    #HTN  - Amlodipine 10  - Carvedilol 3.125 BID    #history of Diabetes per chart  - will check A1C  - FS has been <150  - Monitor glucose via lab    #Gout  - Allopurinol 200    #COVID positive (1/31) - completed isolation  - completed RDV x 5 Day    #Incidental finds:  - 1.8 cm left apical lung mass, suspicious for malignancy-new from neck CT of 6/1/2023. ****Outpatient pulmonology follow-up for PET/CT and PFTs  - 2 rounded foci in the transverse colon. Differential includes adherent stool versus colonic lesions.****outpatient colonoscopy with Dr. Nelson.    #DVT Prophylaxis  - Lovenox  - SCDs  - TEDs     #Skin:  -No active issues at this time    FEN   - Diet - Regular + Thins  - Dysphagia  SLP - evaluation and treatment    Precautions / PROPHYLAXIS:   - Falls, Cardiac, Seizure   - ortho: Weight bearing status: WBAT   - Lungs: Aspiration, Incentive Spirometer   - Pressure injury/Skin: Turn Q2hrs while in bed, OOB to Chair, PT/OT      MEDICAL PROGNOSIS: GOOD              REHAB POTENTIAL: GOOD              ESTIMATED DISPOSITION: HOME WITH HOME CARE              ELOS: 10-14 Days   EXPECTED THERAPY:     P.T. 1hr/day       O.T. 1hr/day      S.L.P. 1hr/day       P&O Unnecessary       EXP FREQUENCY: 5 days per 7 day period     PRESCREEN COMPARISON:   I have reviewed the prescreen information and I have found no relevant changes between the preadmission screening and my post admission evaluation     RATIONALE FOR INPATIENT ADMISSION - Patient demonstrates the following: (check all that apply)  [X] Medically appropriate for rehabilitation admission  [X] Has attainable rehab goals with an appropriate initial discharge plan  [X] Has rehabilitation potential (expected to make a significant improvement within a reasonable period of time)   [X] Requires close medical management by a rehab physician, rehab nursing care, Hospitalist and comprehensive interdisciplinary team (including PT, OT, & or SLP, Prosthetics and Orthotics)

## 2024-02-12 NOTE — OCCUPATIONAL THERAPY INITIAL EVALUATION ADULT - RUE MMT, REHAB EVAL
"Encounter Date: 3/15/2023       History     Chief Complaint   Patient presents with    Testicle Pain     Left testicular pain for the past 4 months with a palpable "mass"  Had a variocele 9 months ago     CC: Testicle Pain    HPI:   25 y/o M w no pertinent history presenting for evaluation intermittent L testicular pain for the past 9 months. [place] pain 1 month following.  Denies fever chills, dysuria hematuria urgency or frequency, penile pain discharge or swelling, testicular swelling    The history is provided by the patient. The history is limited by a language barrier.  used: friend translating in Nepali.   Review of patient's allergies indicates:  No Known Allergies  History reviewed. No pertinent past medical history.  History reviewed. No pertinent surgical history.  History reviewed. No pertinent family history.  Social History     Tobacco Use    Smoking status: Every Day     Packs/day: 0.50     Types: Cigarettes    Smokeless tobacco: Never   Substance Use Topics    Alcohol use: Never    Drug use: Never     Review of Systems   Constitutional:  Negative for chills and fever.   HENT:  Negative for congestion, ear pain, rhinorrhea and sore throat.    Eyes:  Negative for redness.   Respiratory:  Negative for shortness of breath and stridor.    Cardiovascular:  Negative for chest pain.   Gastrointestinal:  Negative for abdominal pain, constipation, diarrhea, nausea and vomiting.   Genitourinary:  Positive for testicular pain. Negative for dysuria, frequency, hematuria, penile discharge, penile pain, penile swelling, scrotal swelling and urgency.   Musculoskeletal:  Negative for back pain and neck pain.   Skin:  Negative for rash.   Neurological:  Negative for dizziness, speech difficulty, weakness, light-headedness and numbness.   Hematological:  Does not bruise/bleed easily.   Psychiatric/Behavioral:  Negative for confusion.      Physical " Exam     Initial Vitals [03/15/23 1101]   BP Pulse Resp Temp SpO2   (!) 141/86 86 16 97.7 °F (36.5 °C) 98 %      MAP       --         Physical Exam    Nursing note and vitals reviewed.  Constitutional: He appears well-developed and well-nourished. No distress.   HENT:   Head: Normocephalic.   Right Ear: External ear normal.   Left Ear: External ear normal.   Eyes: Conjunctivae are normal.   Cardiovascular:  Normal rate and regular rhythm.     Exam reveals no gallop and no friction rub.       No murmur heard.  Pulmonary/Chest: No respiratory distress. He has no wheezes. He has no rhonchi. He has no rales.   Abdominal: Abdomen is soft. He exhibits no distension. There is no abdominal tenderness. There is no rebound and no guarding.   Genitourinary:    Genitourinary Comments: Chaperoned by Ana Maria EPPS over the L testicle with no palpable mass   No inguinal lymphadenopathy        Musculoskeletal:         General: Normal range of motion.     Neurological: He is alert.   Skin: Skin is warm and dry. No rash noted.   Psychiatric: He has a normal mood and affect. His behavior is normal. Judgment and thought content normal.       ED Course   Procedures  Labs Reviewed   URINALYSIS, REFLEX TO URINE CULTURE - Abnormal; Notable for the following components:       Result Value    Appearance, UA Hazy (*)     Protein, UA Trace (*)     All other components within normal limits    Narrative:     Specimen Source->Urine   C. TRACHOMATIS/N. GONORRHOEAE BY AMP DNA          Imaging Results              US Scrotum And Testicles (Final result)  Result time 03/15/23 12:28:31      Final result by Sunny Sethi MD (03/15/23 12:28:31)                   Impression:      No acute sonographic abnormality identified in either testicle.      Electronically signed by: Sunyn Sethi MD  Date:    03/15/2023  Time:    12:28               Narrative:    EXAMINATION:  US SCROTUM AND TESTICLES    CLINICAL HISTORY:  Testicular pain,  unspecified.    TECHNIQUE:  Sonography of the scrotum and testes.    COMPARISON:  None.    FINDINGS:  Right Testicle:    *Size: 5.0 x 3.0 x 3.0 cm  *Appearance: Normal.  *Flow: Normal arterial and venous flow  *Epididymis: Normal.  No hypervascularity.  *Hydrocele: None.  *Varicocele: None.  Left Testicle:    *Size: 4.9 x 2.7 x 3.0 cm  *Appearance: Normal.  *Flow: Normal arterial and venous flow  *Epididymis: Normal.  No hypervascularity.  *Hydrocele: None.  *Varicocele: None.  Other findings: [id number] color Doppler ultrasound through both testicles demonstrates normal and symmetric vascularity.                                       Medications   ibuprofen tablet 600 mg (600 mg Oral Given 3/15/23 1151)     Medical Decision Making:   Clinical Tests:   Lab Tests: Ordered and Reviewed  Radiological Study: Ordered and Reviewed  ED Management:  23 y/o M w history of varicocele surgery presenting for L testicular pain. No evidence of abscess or cellulitis. UA negative. GC pending.   US with no torsion or masses. Abdomen soft nontender. Doubt acute abdomen.   Will tx with doxy for possible STI.   Pcp follow up. Return to ER for worsening symptoms or as needed.                           Clinical Impression:   Final diagnoses:  [N50.819] Testicular pain  [M54.32] Sciatica of left side (Primary)        ED Disposition Condition    Discharge Stable          ED Prescriptions       Medication Sig Dispense Start Date End Date Auth. Provider    ibuprofen (ADVIL,MOTRIN) 600 MG tablet Take 1 tablet (600 mg total) by mouth every 6 (six) hours as needed for Pain. 20 tablet 3/15/2023 3/20/2023 Maria Elena Almanza PA-C    acetaminophen (TYLENOL) 500 MG tablet Take 1 tablet (500 mg total) by mouth every 4 (four) hours as needed. 20 tablet 3/15/2023 3/20/2023 Maria Elena Almanza PA-C    doxycycline (VIBRAMYCIN) 100 MG Cap Take 1 capsule (100 mg total) by mouth every 12 (twelve) hours. for 7 days 14 capsule 3/15/2023 3/22/2023  Maria Elena Almanza PA-C          Follow-up Information       Follow up With Specialties Details Why Contact Info    Your Primary Care Doctor  Schedule an appointment as soon as possible for a visit in 2 days      [facility] Emergency Dept Emergency Medicine Go to  As needed, If symptoms worsen [address]  [facility] [id number]  [phone]             Maria Elena Almanza PA-C  03/15/23 2048     no strength deficits were identified

## 2024-02-12 NOTE — DISCHARGE NOTE PROVIDER - NSDCCPCAREPLAN_GEN_ALL_CORE_FT
PRINCIPAL DISCHARGE DIAGNOSIS  Diagnosis: Fall  Assessment and Plan of Treatment: Fall prevention includes ways to make your home and other areas safer. It also includes ways you can move more carefully to prevent a fall. Health conditions that cause changes in your blood pressure, vision, or muscle strength and coordination may increase your risk for falls. Medicines may also increase your risk for falls if they make you dizzy, weak, or sleepy.  Seek Medical Attention If:  You have fallen and are unconscious.  Fallen and cannot move part of your body.  You have fallen and have pain or a headache.  Fall prevention tips:  Stand or sit up slowly.  Use assistive devices as directed.   You may need to have grab bars put in your bathroom near the toilet or in the shower.  Wear shoes that fit well and have soles that . Wear shoes both inside and outside. Do not wear shoes with high heels.  Wear a personal alarm that can call 911 in an emergency.   Manage your medical conditions. Keep all appointments with your healthcare providers. Visit your eye doctor as directed.  Home safety tips:  Put nonslip strips on your bath or shower floor to prevent you from slipping.  Use a shower seat so you do not need to stand while you shower. Sit on the toilet or a chair in your bathroom to dry yourself and put on clothing. This will prevent you from losing your balance from drying or dressing yourself while you are standing.  Keep paths clear. Remove books, shoes, and other objects from walkways and stairs. Place cords for telephones and lamps out of the way so that you do not need to walk over them. Tape them down if you cannot move them. Remove small rugs or secure it with double-sided tape to prevent you from tripping.  Install bright lights in your home. Use night lights to help light paths to the bathroom or kitchen. Always turn on the light before you start walking.  Keep items you use often on shelves within reach. Do not use a step stool to help you reach an item.  Place reflective tape on the edges of your stairs. To see better.        SECONDARY DISCHARGE DIAGNOSES  Diagnosis: H/O Parkinson's disease  Assessment and Plan of Treatment:       Diagnosis: Ambulatory dysfunction  Assessment and Plan of Treatment:     Diagnosis: Lung mass  Assessment and Plan of Treatment:

## 2024-02-12 NOTE — OCCUPATIONAL THERAPY INITIAL EVALUATION ADULT - FINE MOTOR COORDINATION, LEFT HAND THUMB/FINGER OPPOSITION SKILLS, OT EVAL
Spiral Flap Text: The defect edges were debeveled with a #15 scalpel blade. Given the location of the defect, shape of the defect and the proximity to free margins a spiral flap was deemed most appropriate. Using a sterile surgical marker, an appropriate rotation flap was drawn incorporating the defect and placing the expected incisions within the relaxed skin tension lines where possible. The area thus outlined was incised deep to adipose tissue with a #15 scalpel blade. The skin margins were undermined to an appropriate distance in all directions utilizing iris scissors. Following this, the designed flap was carried over into the primary defect and sutured into place. mild impairment

## 2024-02-13 LAB
A1C WITH ESTIMATED AVERAGE GLUCOSE RESULT: 5.9 % — HIGH (ref 4–5.6)
ALBUMIN SERPL ELPH-MCNC: 2.2 G/DL — LOW (ref 3.3–5)
ALP SERPL-CCNC: 68 U/L — SIGNIFICANT CHANGE UP (ref 40–120)
ALT FLD-CCNC: 12 U/L — SIGNIFICANT CHANGE UP (ref 10–45)
ANION GAP SERPL CALC-SCNC: 8 MMOL/L — SIGNIFICANT CHANGE UP (ref 5–17)
AST SERPL-CCNC: 24 U/L — SIGNIFICANT CHANGE UP (ref 10–40)
BILIRUB SERPL-MCNC: 0.7 MG/DL — SIGNIFICANT CHANGE UP (ref 0.2–1.2)
BUN SERPL-MCNC: 18 MG/DL — SIGNIFICANT CHANGE UP (ref 7–23)
CALCIUM SERPL-MCNC: 8.2 MG/DL — LOW (ref 8.4–10.5)
CHLORIDE SERPL-SCNC: 107 MMOL/L — SIGNIFICANT CHANGE UP (ref 96–108)
CO2 SERPL-SCNC: 24 MMOL/L — SIGNIFICANT CHANGE UP (ref 22–31)
CREAT SERPL-MCNC: 0.81 MG/DL — SIGNIFICANT CHANGE UP (ref 0.5–1.3)
EGFR: 94 ML/MIN/1.73M2 — SIGNIFICANT CHANGE UP
ESTIMATED AVERAGE GLUCOSE: 123 MG/DL — HIGH (ref 68–114)
GLUCOSE SERPL-MCNC: 81 MG/DL — SIGNIFICANT CHANGE UP (ref 70–99)
HCT VFR BLD CALC: 41.2 % — SIGNIFICANT CHANGE UP (ref 39–50)
HGB BLD-MCNC: 14.3 G/DL — SIGNIFICANT CHANGE UP (ref 13–17)
MCHC RBC-ENTMCNC: 33.7 PG — SIGNIFICANT CHANGE UP (ref 27–34)
MCHC RBC-ENTMCNC: 34.7 GM/DL — SIGNIFICANT CHANGE UP (ref 32–36)
MCV RBC AUTO: 97.2 FL — SIGNIFICANT CHANGE UP (ref 80–100)
NRBC # BLD: 0 /100 WBCS — SIGNIFICANT CHANGE UP (ref 0–0)
PLATELET # BLD AUTO: 195 K/UL — SIGNIFICANT CHANGE UP (ref 150–400)
POTASSIUM SERPL-MCNC: 4.3 MMOL/L — SIGNIFICANT CHANGE UP (ref 3.5–5.3)
POTASSIUM SERPL-SCNC: 4.3 MMOL/L — SIGNIFICANT CHANGE UP (ref 3.5–5.3)
PROT SERPL-MCNC: 5.8 G/DL — LOW (ref 6–8.3)
RBC # BLD: 4.24 M/UL — SIGNIFICANT CHANGE UP (ref 4.2–5.8)
RBC # FLD: 12.3 % — SIGNIFICANT CHANGE UP (ref 10.3–14.5)
SODIUM SERPL-SCNC: 139 MMOL/L — SIGNIFICANT CHANGE UP (ref 135–145)
WBC # BLD: 10.31 K/UL — SIGNIFICANT CHANGE UP (ref 3.8–10.5)
WBC # FLD AUTO: 10.31 K/UL — SIGNIFICANT CHANGE UP (ref 3.8–10.5)

## 2024-02-13 PROCEDURE — 99232 SBSQ HOSP IP/OBS MODERATE 35: CPT

## 2024-02-13 PROCEDURE — 99223 1ST HOSP IP/OBS HIGH 75: CPT

## 2024-02-13 RX ORDER — GABAPENTIN 400 MG/1
100 CAPSULE ORAL AT BEDTIME
Refills: 0 | Status: DISCONTINUED | OUTPATIENT
Start: 2024-02-13 | End: 2024-02-27

## 2024-02-13 RX ADMIN — LEVETIRACETAM 500 MILLIGRAM(S): 250 TABLET, FILM COATED ORAL at 06:13

## 2024-02-13 RX ADMIN — RIVASTIGMINE 1 PATCH: 4.6 PATCH, EXTENDED RELEASE TRANSDERMAL at 07:41

## 2024-02-13 RX ADMIN — Medication 6 MILLIGRAM(S): at 22:03

## 2024-02-13 RX ADMIN — GABAPENTIN 100 MILLIGRAM(S): 400 CAPSULE ORAL at 22:02

## 2024-02-13 RX ADMIN — Medication 81 MILLIGRAM(S): at 12:20

## 2024-02-13 RX ADMIN — ENTACAPONE 200 MILLIGRAM(S): 200 TABLET, FILM COATED ORAL at 06:48

## 2024-02-13 RX ADMIN — RIVASTIGMINE 1 PATCH: 4.6 PATCH, EXTENDED RELEASE TRANSDERMAL at 17:35

## 2024-02-13 RX ADMIN — POLYETHYLENE GLYCOL 3350 17 GRAM(S): 17 POWDER, FOR SOLUTION ORAL at 17:35

## 2024-02-13 RX ADMIN — LEVETIRACETAM 500 MILLIGRAM(S): 250 TABLET, FILM COATED ORAL at 17:35

## 2024-02-13 RX ADMIN — CARBIDOPA AND LEVODOPA 2 TABLET(S): 25; 100 TABLET ORAL at 12:20

## 2024-02-13 RX ADMIN — ENTACAPONE 200 MILLIGRAM(S): 200 TABLET, FILM COATED ORAL at 17:34

## 2024-02-13 RX ADMIN — ENOXAPARIN SODIUM 40 MILLIGRAM(S): 100 INJECTION SUBCUTANEOUS at 17:35

## 2024-02-13 RX ADMIN — LIDOCAINE 1 PATCH: 4 CREAM TOPICAL at 19:35

## 2024-02-13 RX ADMIN — CARVEDILOL PHOSPHATE 3.12 MILLIGRAM(S): 80 CAPSULE, EXTENDED RELEASE ORAL at 17:34

## 2024-02-13 RX ADMIN — CARVEDILOL PHOSPHATE 3.12 MILLIGRAM(S): 80 CAPSULE, EXTENDED RELEASE ORAL at 06:18

## 2024-02-13 RX ADMIN — CARBIDOPA AND LEVODOPA 2 TABLET(S): 25; 100 TABLET ORAL at 06:48

## 2024-02-13 RX ADMIN — ENTACAPONE 200 MILLIGRAM(S): 200 TABLET, FILM COATED ORAL at 12:20

## 2024-02-13 RX ADMIN — RIVASTIGMINE 1 PATCH: 4.6 PATCH, EXTENDED RELEASE TRANSDERMAL at 19:35

## 2024-02-13 RX ADMIN — SENNA PLUS 2 TABLET(S): 8.6 TABLET ORAL at 22:03

## 2024-02-13 RX ADMIN — Medication 200 MILLIGRAM(S): at 12:20

## 2024-02-13 RX ADMIN — CARBIDOPA AND LEVODOPA 1 TABLET(S): 25; 100 TABLET ORAL at 22:03

## 2024-02-13 RX ADMIN — LIDOCAINE 1 PATCH: 4 CREAM TOPICAL at 17:35

## 2024-02-13 RX ADMIN — CARBIDOPA AND LEVODOPA 2 TABLET(S): 25; 100 TABLET ORAL at 17:34

## 2024-02-13 RX ADMIN — AMLODIPINE BESYLATE 10 MILLIGRAM(S): 2.5 TABLET ORAL at 06:18

## 2024-02-13 RX ADMIN — LIDOCAINE 1 PATCH: 4 CREAM TOPICAL at 07:41

## 2024-02-13 RX ADMIN — Medication 100 MILLIGRAM(S): at 22:03

## 2024-02-13 NOTE — DIETITIAN INITIAL EVALUATION ADULT - ORAL INTAKE PTA/DIET HISTORY
Pt reports eating anywhere from 1-2 meals/day at home. Endorses recent weight loss. Pt's diet recall included chicken, beef, fish.

## 2024-02-13 NOTE — DIETITIAN INITIAL EVALUATION ADULT - PERTINENT MEDS FT
MEDICATIONS  (STANDING):  allopurinol 200 milliGRAM(s) Oral daily  amLODIPine   Tablet 10 milliGRAM(s) Oral daily  aspirin  chewable 81 milliGRAM(s) Oral daily  carbidopa/levodopa  25/100 2 Tablet(s) Oral <User Schedule>  carbidopa/levodopa CR 50/200 1 Tablet(s) Oral <User Schedule>  carvedilol 3.125 milliGRAM(s) Oral every 12 hours  enoxaparin Injectable 40 milliGRAM(s) SubCutaneous every 24 hours  entacapone 200 milliGRAM(s) Oral <User Schedule>  gabapentin 100 milliGRAM(s) Oral at bedtime  levETIRAcetam 500 milliGRAM(s) Oral two times a day  lidocaine   4% Patch 1 Patch Transdermal daily  melatonin 6 milliGRAM(s) Oral at bedtime  polyethylene glycol 3350 17 Gram(s) Oral every 24 hours  rivastigmine patch  9.5 mG/24 Hr(s). 1 Patch Transdermal every 24 hours  senna 2 Tablet(s) Oral at bedtime  traZODone 100 milliGRAM(s) Oral at bedtime    MEDICATIONS  (PRN):  acetaminophen     Tablet .. 650 milliGRAM(s) Oral every 6 hours PRN Mild Pain (1 - 3)  lactulose Syrup 20 Gram(s) Oral daily PRN Constipation

## 2024-02-13 NOTE — DIETITIAN INITIAL EVALUATION ADULT - PERTINENT LABORATORY DATA
02-13    139  |  107  |  18  ----------------------------<  81  4.3   |  24  |  0.81    Ca    8.2<L>      13 Feb 2024 05:35    TPro  5.8<L>  /  Alb  2.2<L>  /  TBili  0.7  /  DBili  x   /  AST  24  /  ALT  12  /  AlkPhos  68  02-13  A1C with Estimated Average Glucose Result: 5.9 % (02-13-24 @ 05:35)  A1C with Estimated Average Glucose Result: 5.6 % (06-02-23 @ 05:41)

## 2024-02-13 NOTE — DIETITIAN NUTRITION RISK NOTIFICATION - ETIOLOGY-BASIS
Acute illness or injury [address]  [address] [facility]  [phone]               Thank you for choosing us for your health care visit with Trupti Simmons DO.   We are glad to serve you and happy to provide you with this summary Reason for Today's Visit     Pain           Medical Issues Discussed Today     Chronic pain of right knee    -  Primary    Weakness of right leg        Decreased range of motion of right knee          Instructions and Information about Your Health     None

## 2024-02-13 NOTE — CONSULT NOTE ADULT - SUBJECTIVE AND OBJECTIVE BOX
HPI   73 year old male with PMH of Parkinson's disease (diagnosed 7 years ago), HTN, 2 x CVA (1998, 2016, minimal residual), seizure (2019 - on keppra), presented to [facility] ED on 1/31 after 2 falls. (of note, patient was admitted for same complaints in 09/22/2022). Trauma w/u was negative for acute traumatic injury. (CT chest, CT abdomen/pelvis, CT c-spine, CT head, xray pelvis, xray chest). Further workup for fall negative. Neurology evaluated the patient and feels that he is having morning freeze episodes secondary to wearing off of sinemet because of lack of long-acting version at bedtime; Sinemet regimen changed with improvement (sinemet 25/100 2 tab at 7am, 12 pm and 5pm; sinemet ER 50/200 1 tab HS (10pm)).     Hospital course complicated by:  - COVID positive on admission  - completed RDV x 5 Day.    - Incidental find of 1.8 cm left apical lung mass, suspicious for malignancy-new from neck CT of 6/1/2023. Will require outpatient pulmonology follow-up for PET/CT and PFTs  - CT abdomen showed: 2 rounded foci in the transverse colon. Differential includes adherent stool versus colonic lesions. Will require outpatient colonoscopy with Dr. Nelson.    Patient was evaluated by PM&R and therapy for functional deficits and gait/ADL impairments and recommend acute rehabilitation.  Patient was medically optimized for discharge to [place] on 2/12/2023    Patient seen and examined at bedside.     ALLERGIES:  penicillin (Unknown)  sulfa drugs (Unknown)  cefaclor (Unknown)    PAST MEDICAL HISTORY:  CVA (cerebral vascular accident)   Diabetes mellitus   Gout   HTN (hypertension)   Parkinson disease.     PAST SURGICAL HISTORY:  History of appendectomy.     FAMILY HISTORY:  FH: CAD (coronary artery disease)  FH: hypertension.    SOCIAL HISTORY  Smoking - Denied  EtOH - Denied   Drugs - Denied    MEDICATIONS  (STANDING):  allopurinol 200 milliGRAM(s) Oral daily  amLODIPine   Tablet 10 milliGRAM(s) Oral daily  aspirin  chewable 81 milliGRAM(s) Oral daily  carbidopa/levodopa  25/100 2 Tablet(s) Oral <User Schedule>  carbidopa/levodopa CR 50/200 1 Tablet(s) Oral <User Schedule>  carvedilol 3.125 milliGRAM(s) Oral every 12 hours  enoxaparin Injectable 40 milliGRAM(s) SubCutaneous every 24 hours  entacapone 200 milliGRAM(s) Oral <User Schedule>  gabapentin 100 milliGRAM(s) Oral at bedtime  levETIRAcetam 500 milliGRAM(s) Oral two times a day  lidocaine   4% Patch 1 Patch Transdermal daily  melatonin 6 milliGRAM(s) Oral at bedtime  polyethylene glycol 3350 17 Gram(s) Oral every 24 hours  rivastigmine patch  9.5 mG/24 Hr(s). 1 Patch Transdermal every 24 hours  senna 2 Tablet(s) Oral at bedtime  traZODone 100 milliGRAM(s) Oral at bedtime    MEDICATIONS  (PRN):  acetaminophen     Tablet .. 650 milliGRAM(s) Oral every 6 hours PRN Mild Pain (1 - 3)  lactulose Syrup 20 Gram(s) Oral daily PRN Constipation    Vital Signs Last 24 Hrs  T(F): 97.9 (13 Feb 2024 08:59), Max: 98.6 (12 Feb 2024 15:45)  HR: 90 (13 Feb 2024 08:59) (71 - 99)  BP: 125/86 (13 Feb 2024 08:59) (110/72 - 146/81)  RR: 16 (13 Feb 2024 08:59) (16 - 18)  SpO2: 94% (13 Feb 2024 08:59) (94% - 97%)  I&O's Summary    BMI (kg/m2): 22.4 (02-12-24 @ 15:45)    PHYSICAL EXAM:  GENERAL: NAD  HEENT: NCAT  CHEST/LUNG: Clear to percussion bilaterally; No rales, rhonchi, wheezing  HEART: Regular rate and rhythm; No murmurs  ABDOMEN: Soft, Nontender, Nondistended; Bowel sounds present  MUSCULOSKELETAL/EXTREMITIES:  2+ Peripheral Pulses, No LE edema  PSYCH: Appropriate affect  NEURO: Alert & Oriented  x3, hypophonia, +tremor (LUE>RUE)    I personally reviewed the below data/images/labs:    LABS:                        14.3   10.31 )-----------( 195      ( 13 Feb 2024 05:35 )             41.2       02-13    139  |  107  |  18  ----------------------------<  81  4.3   |  24  |  0.81    Ca    8.2      13 Feb 2024 05:35    TPro  5.8  /  Alb  2.2  /  TBili  0.7  /  DBili  x   /  AST  24  /  ALT  12  /  AlkPhos  68  02-13 02-02 Chol 144 mg/dL LDL -- HDL 46 mg/dL Trig 93 mg/dL    Urinalysis Basic - ( 13 Feb 2024 05:35 )    Color: x / Appearance: x / SG: x / pH: x  Gluc: 81 mg/dL / Ketone: x  / Bili: x / Urobili: x   Blood: x / Protein: x / Nitrite: x   Leuk Esterase: x / RBC: x / WBC x   Sq Epi: x / Non Sq Epi: x / Bacteria: x    Consultant(s) Notes Reviewed:   Care Discused with Consultants/Other Providers: yes  Imaging Personally Reviewed:     CT Chest +Abdomen and Pelvis w/ IV Cont (01.31.24 @ 14:11) >  1.8 cm left apical lung mass, suspicious for malignancy-new from neck CT of 6/1/2023 were the lung apices were imaged. Recommend oncologic evaluation.  Indeterminate right pulmonary nodular opacities. Recommend follow-up.  2 rounded foci in the transverse colon. Differential includes adherent   stool versus colonic lesions. Recommend follow-up with colonoscopy.  No CT evidence of acute/traumatic injury in the chest abdomen or pelvis    CT Cervical Spine No Cont (01.31.24 @ 14:04) >  CT HEAD: No acute intracranial findings.  Stable moderate chronic microvascular ischemic changes and chronic lacunar infarcts.  CT CERVICAL SPINE: No acute cervical fracture or facet subluxation.    Xray Pelvis AP only (01.31.24 @ 13:06) >  No acute fracture or acute articular abnormality. .    Xray Chest 1 View AP/PA (01.31.24 @ 13:05) >  No radiographic evidence of acute cardiopulmonary disease.

## 2024-02-13 NOTE — PROGRESS NOTE ADULT - SUBJECTIVE AND OBJECTIVE BOX
SUBJECTIVE/ROS: Patient evaluated while in the bed. He states he slept well last night. His main complain is the left hand tremor but states that it is better than last week. He also complains of low back pain which he has had for "a while." He denies chest pain, fever, chills, nausea, vomiting, abdominal pain, headache, or BLE pain.     HPI:  74 y/o male with PMH of Parkinson's disease (diagnosed 7 years ago), HTN, 2 x CVA (1998, 2016, minimal residual), seizure (2019 - on keppra), presented to [facility] ED on 1/31 after 2 falls. (of note, patient was admitted for same complaints in 09/22/2022). Trauma w/u was negative for acute traumatic injury. (CT chest, CT abdomen/pelvis, CT c-spine, CT head, xray pelvis, xray chest). Further workup for fall negative. Neurology evaluated the patient and feels that he is having morning freeze episodes secondary to wearing off of sinemet because of lack of long-acting version at bedtime; Sinemet regimen changed with improvement (sinemet 25/100 2 tab at 7am, 12 pm and 5pm; sinemet ER 50/200 1 tab HS (10pm)).     Hospital course complicated by:  - COVID positive on admission  - completed RDV x 5 Day.    - Incidental find of 1.8 cm left apical lung mass, suspicious for malignancy-new from neck CT of 6/1/2023. Will require outpatient pulmonology follow-up for PET/CT and PFTs  - CT abdomen showed: 2 rounded foci in the transverse colon. Differential includes adherent stool versus colonic lesions. Will require outpatient colonoscopy with Dr. Nelson.    Patient was evaluated by PM&R and therapy for functional deficits and gait/ADL impairments and recommend acute rehabilitation.  Patient was medically optimized for discharge to [facility] on 2/12/2023           Vital Signs Last 24 Hrs  T(C): 36.6 (13 Feb 2024 08:59), Max: 37 (12 Feb 2024 15:45)  T(F): 97.9 (13 Feb 2024 08:59), Max: 98.6 (12 Feb 2024 15:45)  HR: 90 (13 Feb 2024 08:59) (71 - 99)  BP: 125/86 (13 Feb 2024 08:59) (110/72 - 146/81)  BP(mean): --  RR: 16 (13 Feb 2024 08:59) (16 - 18)  SpO2: 94% (13 Feb 2024 08:59) (94% - 97%)    Parameters below as of 13 Feb 2024 08:59  Patient On (Oxygen Delivery Method): room air      Daily Height in cm: 172.72 (12 Feb 2024 15:45)    Daily       PHYSICAL EXAM  Constitutional - NAD, Comfortable  HEENT - NCAT, EOMI  Neck - Supple, No limited ROM  Chest - Breathing comfortably   Cardiovascular - RRR, S1S2  Abdomen -BS+, Soft, NTND  Extremities - No C/C/E, No calf tenderness   Neurologic Exam - ao to self, place, and time, floyd 5/5, left hand mild tremor, BUE rigidity, bradykinesia, hypophonia    RECENT LABS:                          14.3   10.31 )-----------( 195      ( 13 Feb 2024 05:35 )             41.2     02-13    139  |  107  |  18  ----------------------------<  81  4.3   |  24  |  0.81    Ca    8.2<L>      13 Feb 2024 05:35    TPro  5.8<L>  /  Alb  2.2<L>  /  TBili  0.7  /  DBili  x   /  AST  24  /  ALT  12  /  AlkPhos  68  02-13    LIVER FUNCTIONS - ( 13 Feb 2024 05:35 )  Alb: 2.2 g/dL / Pro: 5.8 g/dL / ALK PHOS: 68 U/L / ALT: 12 U/L / AST: 24 U/L / GGT: x                 MEDICATIONS  (STANDING):  allopurinol 200 milliGRAM(s) Oral daily  amLODIPine   Tablet 10 milliGRAM(s) Oral daily  aspirin  chewable 81 milliGRAM(s) Oral daily  carbidopa/levodopa  25/100 2 Tablet(s) Oral <User Schedule>  carbidopa/levodopa CR 50/200 1 Tablet(s) Oral <User Schedule>  carvedilol 3.125 milliGRAM(s) Oral every 12 hours  enoxaparin Injectable 40 milliGRAM(s) SubCutaneous every 24 hours  entacapone 200 milliGRAM(s) Oral <User Schedule>  levETIRAcetam 500 milliGRAM(s) Oral two times a day  lidocaine   4% Patch 1 Patch Transdermal daily  melatonin 6 milliGRAM(s) Oral at bedtime  polyethylene glycol 3350 17 Gram(s) Oral every 24 hours  rivastigmine patch  9.5 mG/24 Hr(s). 1 Patch Transdermal every 24 hours  senna 2 Tablet(s) Oral at bedtime  traZODone 100 milliGRAM(s) Oral at bedtime    MEDICATIONS  (PRN):  acetaminophen     Tablet .. 650 milliGRAM(s) Oral every 6 hours PRN Mild Pain (1 - 3)  lactulose Syrup 20 Gram(s) Oral daily PRN Constipation  meclizine 12.5 milliGRAM(s) Oral two times a day PRN Dizziness  methocarbamol 750 milliGRAM(s) Oral every 8 hours PRN Muscle Spasm  zolpidem 5 milliGRAM(s) Oral at bedtime PRN Insomnia

## 2024-02-13 NOTE — DIETITIAN NUTRITION RISK NOTIFICATION - TREATMENT: THE FOLLOWING DIET HAS BEEN RECOMMENDED
Diet, Regular:   Supplement Feeding Modality:  Oral  Ensure Plus High Protein Cans or Servings Per Day:  1       Frequency:  Three Times a day (02-13-24 @ 16:13) [Pending Verification By Attending]  Diet, Regular (02-12-24 @ 14:12) [Active]

## 2024-02-13 NOTE — PROGRESS NOTE ADULT - ASSESSMENT
ASSESSMENT/PLAN  74 y/o male with PMH of Parkinson's disease (diagnosed 7 years ago), HTN, 2 x CVA (1998, 2016), seizure (2019 - on keppra), presented to [facility] ED on 1/31 after 2 falls. Fall w/u negative - attribute fall to PD regimen.    #Parkinson's Disease now with gait Instability, ADL impairments and Functional impairments  - Continue Comprehensive Rehab Program of PT/OT/SLP    ------------------------------------------------------  Parkinson's related motor symptoms    #Dyskinesia/Rigidity/Bradykinesia  - Sinemet 25/100 2 tabs 7am, 12pm, 5pm  - Entacapone 200 1 tab at 7am, 12pm and 5pm   - Sinemet ER 50/200 1 tab HS  - Rivastigmine 9.5mg transdermal   - Movement disorders following    --------------------------------------------------------  Parkinson's related non-motor symptoms    #Orthostatic hypotension  -Monitor OH vital signs  -Currently on carvidelol and norvasc -re-evaluate continued need  #Dizziness may be OH related  - Meclizine 12.5 BID PRN - stop meclizine due to sedation 2/13    #Sleep/Mood  - Trazodone 100 at bedtime   - Melatonin 6mg HS  -Stop ambien as may be contributing to dizziness 2/13  - Recreation Therapy  - Neuropsych consult    #Pain control  - Tylenol PRN, Lidocaine patch   - Muscle relaxant: Methocarbamol 750 TID PRN - stop due to dizziness  -Start Gabapentin 100mg at bedtime for LBP 2/13    #GI/Bowel Management/Constipation  - Senna at bedtime, Miralax QD  - Lactulose PRN    #Bladder management  - Continue to monitor PVR q 8 hours (SC if > 400)  - Monitor UO    ----------------------------------------------------------  Concurrent medical problems    #Prior CVA  - ASA 81  - previously on atorvastatin 40 (now off since 1/2023) - LDL 80    #Seizure  - Keppra 500 BID    #HTN  - Amlodipine 10 daily  - Carvedilol 3.125 BID  -Reevaluate need due to possible symptomatic OH    #history of Diabetes per chart  - will check A1C  - FS has been <150  - Monitor glucose via lab    #Gout  - Allopurinol 200    #COVID positive (1/31) - completed isolation  - completed RDV x 5 Day    #Incidental finds:  - 1.8 cm left apical lung mass, suspicious for malignancy-new from neck CT of 6/1/2023. ****Outpatient pulmonology follow-up for PET/CT and PFTs  - 2 rounded foci in the transverse colon. Differential includes adherent stool versus colonic lesions.****outpatient colonoscopy with Dr. Nelson.    #DVT Prophylaxis  - Lovenox  - TEDs     #Skin:  -No active issues at this time    FEN   - Diet - Regular + Thins  - Dysphagia  SLP - evaluation and treatment    Precautions / PROPHYLAXIS:   - Falls, Cardiac, Seizure   - ortho: Weight bearing status: WBAT   - Lungs: Aspiration  - Pressure injury/Skin:  OOB to Chair, PT/OT

## 2024-02-13 NOTE — CONSULT NOTE ADULT - TIME BILLING
Time spent includes direct patient care (interview and examination of patient), discussion with other providers, support staff and/or patient's family members, review of medical records, ordering diagnostic tests and analyzing results, and documentation

## 2024-02-13 NOTE — DIETITIAN INITIAL EVALUATION ADULT - NSPROEDAREADYLEARN_GEN_A_NUR
Spoke with Pt she would like to wait another month to schedule until she is feeling well.        none

## 2024-02-13 NOTE — DIETITIAN INITIAL EVALUATION ADULT - OTHER INFO
74 y/o male with PMH of Parkinson's disease (diagnosed 7 years ago), HTN, 2 x CVA (1998, 2016), seizure (2019 - on keppra), presented to [facility] ED on 1/31 after 2 falls. Fall w/u negative - attribute fall to PD regimen.    Pt tolerating diet, reports good PO intake/appetite since admission. Pt reports UBW of 158 lbs. Current weight is 147 lbs. Pt believes weight loss id due to stress/hospitalization. Receptive to adding Ensure Plus High Protein (provides 350 kcal, 20 g protein/serving). Obtained food preferences to optimize PO intake. Pt educated on sinemet/levodopa - protein interaction. Denies nausea, vomiting, diarrhea, constipation. Pt denies chewing/swallowing difficulties.

## 2024-02-14 PROCEDURE — 99232 SBSQ HOSP IP/OBS MODERATE 35: CPT

## 2024-02-14 PROCEDURE — 99233 SBSQ HOSP IP/OBS HIGH 50: CPT

## 2024-02-14 RX ORDER — AMLODIPINE BESYLATE 2.5 MG/1
5 TABLET ORAL DAILY
Refills: 0 | Status: DISCONTINUED | OUTPATIENT
Start: 2024-02-15 | End: 2024-02-27

## 2024-02-14 RX ADMIN — RIVASTIGMINE 1 PATCH: 4.6 PATCH, EXTENDED RELEASE TRANSDERMAL at 17:25

## 2024-02-14 RX ADMIN — ENTACAPONE 200 MILLIGRAM(S): 200 TABLET, FILM COATED ORAL at 12:12

## 2024-02-14 RX ADMIN — Medication 200 MILLIGRAM(S): at 12:12

## 2024-02-14 RX ADMIN — Medication 100 MILLIGRAM(S): at 22:14

## 2024-02-14 RX ADMIN — CARVEDILOL PHOSPHATE 3.12 MILLIGRAM(S): 80 CAPSULE, EXTENDED RELEASE ORAL at 17:30

## 2024-02-14 RX ADMIN — CARBIDOPA AND LEVODOPA 2 TABLET(S): 25; 100 TABLET ORAL at 12:11

## 2024-02-14 RX ADMIN — RIVASTIGMINE 1 PATCH: 4.6 PATCH, EXTENDED RELEASE TRANSDERMAL at 17:30

## 2024-02-14 RX ADMIN — LEVETIRACETAM 500 MILLIGRAM(S): 250 TABLET, FILM COATED ORAL at 07:12

## 2024-02-14 RX ADMIN — GABAPENTIN 100 MILLIGRAM(S): 400 CAPSULE ORAL at 22:14

## 2024-02-14 RX ADMIN — LIDOCAINE 1 PATCH: 4 CREAM TOPICAL at 17:29

## 2024-02-14 RX ADMIN — AMLODIPINE BESYLATE 10 MILLIGRAM(S): 2.5 TABLET ORAL at 07:12

## 2024-02-14 RX ADMIN — ENTACAPONE 200 MILLIGRAM(S): 200 TABLET, FILM COATED ORAL at 07:23

## 2024-02-14 RX ADMIN — CARBIDOPA AND LEVODOPA 1 TABLET(S): 25; 100 TABLET ORAL at 22:14

## 2024-02-14 RX ADMIN — CARVEDILOL PHOSPHATE 3.12 MILLIGRAM(S): 80 CAPSULE, EXTENDED RELEASE ORAL at 07:12

## 2024-02-14 RX ADMIN — LIDOCAINE 1 PATCH: 4 CREAM TOPICAL at 19:29

## 2024-02-14 RX ADMIN — CARBIDOPA AND LEVODOPA 2 TABLET(S): 25; 100 TABLET ORAL at 17:28

## 2024-02-14 RX ADMIN — ENOXAPARIN SODIUM 40 MILLIGRAM(S): 100 INJECTION SUBCUTANEOUS at 17:30

## 2024-02-14 RX ADMIN — ENTACAPONE 200 MILLIGRAM(S): 200 TABLET, FILM COATED ORAL at 17:29

## 2024-02-14 RX ADMIN — Medication 81 MILLIGRAM(S): at 12:12

## 2024-02-14 RX ADMIN — LEVETIRACETAM 500 MILLIGRAM(S): 250 TABLET, FILM COATED ORAL at 17:30

## 2024-02-14 RX ADMIN — LIDOCAINE 1 PATCH: 4 CREAM TOPICAL at 05:35

## 2024-02-14 RX ADMIN — Medication 6 MILLIGRAM(S): at 22:13

## 2024-02-14 RX ADMIN — RIVASTIGMINE 1 PATCH: 4.6 PATCH, EXTENDED RELEASE TRANSDERMAL at 07:50

## 2024-02-14 RX ADMIN — POLYETHYLENE GLYCOL 3350 17 GRAM(S): 17 POWDER, FOR SOLUTION ORAL at 17:29

## 2024-02-14 RX ADMIN — CARBIDOPA AND LEVODOPA 2 TABLET(S): 25; 100 TABLET ORAL at 07:12

## 2024-02-14 NOTE — PROGRESS NOTE ADULT - ASSESSMENT
ASSESSMENT/PLAN  74 y/o male with PMH of Parkinson's disease (diagnosed 7 years ago), HTN, 2 x CVA (1998, 2016), seizure (2019 - on keppra), presented to [facility] ED on 1/31 after 2 falls. Fall w/u negative - attribute fall to PD regimen.    #Parkinson's Disease now with gait Instability, ADL impairments and Functional impairments  - Continue Comprehensive Rehab Program of PT/OT/SLP    ------------------------------------------------------  Parkinson's related motor symptoms    #Dyskinesia/Rigidity/Bradykinesia  - Sinemet 25/100 2 tabs 7am, 12pm, 5pm  - Entacapone 200 1 tab at 7am, 12pm and 5pm   - Sinemet ER 50/200 1 tab HS  - Rivastigmine 9.5mg transdermal   - Movement disorders following    --------------------------------------------------------  Parkinson's related non-motor symptoms    #Orthostatic hypotension  -Monitor OH vital signs  -Currently on carvedilol and norvasc - re-evaluate continued need    #Dizziness may be OH related  - Meclizine 12.5 BID PRN - discontinued meclizine due to sedation 2/13 - tolerating the change well    #Sleep/Mood  - Trazodone 100 at bedtime   - Melatonin 6mg HS  -Stop ambien as may be contributing to dizziness 2/13 - tolerating the change well  - Recreation Therapy  - Neuropsych consult    #Pain control  - Tylenol PRN, Lidocaine patch   - Muscle relaxant: Methocarbamol 750 TID PRN - stop due to dizziness   -Start Gabapentin 100mg at bedtime for LBP 2/13 - tolerating medication well     #GI/Bowel Management/Constipation  - Senna at bedtime, Miralax QD  - Lactulose PRN    #Bladder management  - Continue to monitor PVR q 8 hours (SC if > 400)  - Monitor UO    ----------------------------------------------------------  Concurrent medical problems    #Prior CVA  - ASA 81  - previously on atorvastatin 40 (now off since 1/2023) - LDL 80    #Seizure  - Keppra 500 BID    #HTN  - Amlodipine 10 daily  - Carvedilol 3.125 BID  - Reevaluate need due to possible symptomatic OH    #history of Diabetes per chart  - will check A1C  - FS has been <150  - Monitor glucose via lab    #Gout  - Allopurinol 200    #COVID positive (1/31) - completed isolation  - completed RDV x 5 Day    #Incidental finds:  - 1.8 cm left apical lung mass, suspicious for malignancy-new from neck CT of 6/1/2023. ****Outpatient pulmonology follow-up for PET/CT and PFTs  - 2 rounded foci in the transverse colon. Differential includes adherent stool versus colonic lesions.****outpatient colonoscopy with Dr. Nelson.    #DVT Prophylaxis  - Lovenox  - TEDs     #Skin:  -No active issues at this time    FEN   - Diet - Regular + Thins  - Dysphagia  SLP - evaluation and treatment    Precautions / PROPHYLAXIS:   - Falls, Cardiac, Seizure   - ortho: Weight bearing status: WBAT   - Lungs: Aspiration  - Pressure injury/Skin:  OOB to Chair, PT/OT     ASSESSMENT/PLAN  72 y/o male with PMH of Parkinson's disease (diagnosed 7 years ago), HTN, 2 x CVA (1998, 2016), seizure (2019 - on keppra), presented to [facility] ED on 1/31 after 2 falls. Fall w/u negative - attribute fall to PD regimen.    #Parkinson's Disease now with gait Instability, ADL impairments and Functional impairments  - Continue Comprehensive Rehab Program of PT/OT/SLP    ------------------------------------------------------  Parkinson's related motor symptoms    #Dyskinesia/Rigidity/Bradykinesia  - Sinemet 25/100 2 tabs 7am, 12pm, 5pm  - Entacapone 200 1 tab at 7am, 12pm and 5pm   - Sinemet ER 50/200 1 tab HS  - Rivastigmine 9.5mg transdermal   - Movement disorders following    --------------------------------------------------------  Parkinson's related non-motor symptoms    #Orthostatic hypotension  -Monitor OH vital signs  -Currently on carvedilol and norvasc - re-evaluate continued need    #Dizziness may be OH related  - Meclizine 12.5 BID PRN - discontinued meclizine due to sedation 2/13 - tolerating the change well    #Sleep/Mood  - Trazodone 100 at bedtime   - Melatonin 6mg HS  -Stop ambien as may be contributing to dizziness 2/13 - tolerating the change well  - Recreation Therapy  - Neuropsych consult    #Pain control  - Tylenol PRN, Lidocaine patch   - Muscle relaxant: Methocarbamol 750 TID PRN - stop due to dizziness   -Start Gabapentin 100mg at bedtime for LBP 2/13 - tolerating medication well     #GI/Bowel Management/Constipation  - Senna at bedtime, Miralax QD  - Lactulose PRN    #Bladder management  - Continue to monitor PVR q 8 hours (SC if > 400)  - Monitor UO    ----------------------------------------------------------  Concurrent medical problems    #Prior CVA  - ASA 81  - previously on atorvastatin 40 (now off since 1/2023) - LDL 80    #Seizure  - Keppra 500 BID    #HTN  - [id number] mg daily - reduced to 5 mg daily by hospitalist  - Carvedilol 3.125 BID  - Reevaluate need due to possible symptomatic OH - currently denies OH symptoms     #history of Diabetes per chart  - A1C 5.9  - FS has been <150  - Continue monitoring glucose via lab    #Gout  - Allopurinol 200    #COVID positive (1/31) - completed isolation  - completed RDV x 5 Day    #Incidental finds:  - 1.8 cm left apical lung mass, suspicious for malignancy-new from neck CT of 6/1/2023. ****Outpatient pulmonology follow-up for PET/CT and PFTs  - 2 rounded foci in the transverse colon. Differential includes adherent stool versus colonic lesions.****outpatient colonoscopy with Dr. Nelson.    #DVT Prophylaxis  - Lovenox  - TEDs     #Skin:  -No active issues at this time    FEN   - Diet - Regular + Thins  - Dysphagia  SLP - evaluation and treatment    Precautions / PROPHYLAXIS:   - Falls, Cardiac, Seizure   - ortho: Weight bearing status: WBAT   - Lungs: Aspiration  - Pressure injury/Skin:  OOB to Chair, PT/OT

## 2024-02-14 NOTE — PROGRESS NOTE ADULT - SUBJECTIVE AND OBJECTIVE BOX
SUBJECTIVE/ROS: Patient evaluated while in bed. Subjectively better than yesterday. Gabapentin was started at bedtime, states he slept well. He denies chest pain, fever, chills, nausea, vomiting, abdominal pain, headache, or BLE pain.     HPI:  74 y/o male with PMH of Parkinson's disease (diagnosed 7 years ago), HTN, 2 x CVA (1998, 2016, minimal residual), seizure (2019 - on keppra), presented to [facility] ED on 1/31 after 2 falls. (of note, patient was admitted for same complaints in 09/22/2022). Trauma w/u was negative for acute traumatic injury. (CT chest, CT abdomen/pelvis, CT c-spine, CT head, xray pelvis, xray chest). Further workup for fall negative. Neurology evaluated the patient and feels that he is having morning freeze episodes secondary to wearing off of sinemet because of lack of long-acting version at bedtime; Sinemet regimen changed with improvement (sinemet 25/100 2 tab at 7am, 12 pm and 5pm; sinemet ER 50/200 1 tab HS (10pm)).     Hospital course complicated by:  - COVID positive on admission  - completed RDV x 5 Day.    - Incidental find of 1.8 cm left apical lung mass, suspicious for malignancy-new from neck CT of 6/1/2023. Will require outpatient pulmonology follow-up for PET/CT and PFTs  - CT abdomen showed: 2 rounded foci in the transverse colon. Differential includes adherent stool versus colonic lesions. Will require outpatient colonoscopy with Dr. Nelson.    Patient was evaluated by PM&R and therapy for functional deficits and gait/ADL impairments and recommend acute rehabilitation.  Patient was medically optimized for discharge to [place] on 2/12/2023    Vital Signs Last 24 Hrs  T(C): 36.5 (14 Feb 2024 09:17), Max: 36.7 (13 Feb 2024 21:58)  T(F): 97.7 (14 Feb 2024 09:17), Max: 98.1 (13 Feb 2024 21:58)  HR: 79 (14 Feb 2024 07:08) (79 - 101)  BP: 113/76 (14 Feb 2024 07:08) (101/65 - 130/87)  BP(mean): --  RR: 16 (14 Feb 2024 09:17) (16 - 16)  SpO2: 100% (14 Feb 2024 09:17) (94% - 100%)    Parameters below as of 14 Feb 2024 09:17  Patient On (Oxygen Delivery Method): room air    Height in cm: 172.72 (12 Feb 2024 15:45)      PHYSICAL EXAM  Constitutional - NAD, Comfortable  HEENT - NCAT, EOMI  Neck - Supple, No limited ROM  Chest - Breathing comfortably   Extremities - No C/C/E   Neurologic Exam - alert, oriented to self, place, and time, EOMI, mild hypomimia, mild hypophonia, intermittent rest and postural tremor of the left hand, mild bradykinesia worse on the left. Normal coordination. No sensory deficits.     MEDICATIONS  (STANDING):  allopurinol 200 milliGRAM(s) Oral daily  amLODIPine   Tablet 5 milliGRAM(s) Oral daily  aspirin  chewable 81 milliGRAM(s) Oral daily  carbidopa/levodopa  25/100 2 Tablet(s) Oral <User Schedule>  carbidopa/levodopa CR 50/200 1 Tablet(s) Oral <User Schedule>  carvedilol 3.125 milliGRAM(s) Oral every 12 hours  enoxaparin Injectable 40 milliGRAM(s) SubCutaneous every 24 hours  entacapone 200 milliGRAM(s) Oral <User Schedule>  gabapentin 100 milliGRAM(s) Oral at bedtime  levETIRAcetam 500 milliGRAM(s) Oral two times a day  lidocaine   4% Patch 1 Patch Transdermal daily  melatonin 6 milliGRAM(s) Oral at bedtime  polyethylene glycol 3350 17 Gram(s) Oral every 24 hours  rivastigmine patch  9.5 mG/24 Hr(s). 1 Patch Transdermal every 24 hours  senna 2 Tablet(s) Oral at bedtime  traZODone 100 milliGRAM(s) Oral at bedtime    MEDICATIONS  (PRN):  acetaminophen     Tablet .. 650 milliGRAM(s) Oral every 6 hours PRN Mild Pain (1 - 3)  lactulose Syrup 20 Gram(s) Oral daily PRN Constipation

## 2024-02-14 NOTE — PROGRESS NOTE ADULT - SUBJECTIVE AND OBJECTIVE BOX
|-------------------------------------------|                       Subjective   |-------------------------------------------|    No events overnight  No new complaints to offer me    Vital Signs Last 24 Hrs  T(F): 98.1 (13 Feb 2024 21:58), Max: 98.1 (13 Feb 2024 21:58)  HR: 79 (14 Feb 2024 07:08) (79 - 101)  BP: 113/76 (14 Feb 2024 07:08) (101/65 - 130/87)  RR: 16 (13 Feb 2024 21:58) (16 - 16)  SpO2: 94% (13 Feb 2024 21:58) (94% - 94%)    I&O's Summary      |------------------------------------------|                       Objective  |------------------------------------------|    Examination:   General: NAD, Comfortable  Pulm: CTA BL No Rhonchi Rales or wheezes  Neck: Supple, No JVD  CVS: RRR No rubs murmurs or gallops  Abdomen: Soft, Nontender, Nondistended; No masses or organomegally  Extremities: No calf tenderness, No pitting edema    Labs:                        14.3   10.31 )-----------( 195      ( 13 Feb 2024 05:35 )             41.2       02-13    139  |  107  |  18  ----------------------------<  81  4.3   |  24  |  0.81    Ca    8.2      13 Feb 2024 05:35    TPro  5.8  /  Alb  2.2  /  TBili  0.7  /  DBili  x   /  AST  24  /  ALT  12  /  AlkPhos  68  02-13       |-------------------------------------------------|                  Assessment and plan  |-------------------------------------------------|  73M with Parkinson's disease (diagnosed 7 years ago), HTN, 2 x CVA (1998, 2016, minimal residual), Seizure DO (2019 - on keppra),  Admit to [facility] Jan31 SP Fall  Sinemet regiment adjusted  Hospital course sig for. Covid PCR + on Admission, Left Apical Lung Mass, Intraluminal Transverse colon lesion on CT  Tx to Acute Rehab      Parkinson's related motor symptoms  - Presenting complaint (fall) attributed to rigidity. Meds have been adjusted  - Sinemet 25/100 2 tabs 7am, 12pm, 5pm  - Sinemet ER 50/200 1 tab HS  - Entacapone 200 1 tab at 7am, 12pm and 5pm   - Rivastigmine 9.5mg transdermal    Orthostatic hypotension  -Monitor OH vital signs  -TEDS and abd binders   -Currently on coreg and norvasc  -BP a little soft overnight. Will down titrate norvasc    Dizziness   Was on meclizine BID which was DC'd due meclizine d/c'd with concerns of sedation 2/13  No episodes reported overnight    Sleep/Mood  - On Trazodone and melatonin QHS  - Ambien DC'd with concerns of dizziness 2/13    Prior CVA  On ASA 81  Off Lipitor Since January per chart  May FU with managing dr      #Seizure  - On Keppra   #HTN  - on amlodipine and coreg   we downtitrated Amlodipine today in regards to orthostatic concerns    DM  Documented DM in chart.   A1C 5.9% and fasting sugars have been acceptable  Will watch with QD finger sticks      #Gout  - Allopurinol 200    COVID positive (1/31) - completed isolation  - completed RDV x 5 Day    Lung Mass  1.8 cm left apical lung mass, suspicious for malignancy-new from neck CT of 6/1/2023.   Outpatient pulmonology follow-up for PET/CT and PFTs    Colonic Lesion  Intraluminal Lesion. Outpatient Colonoscopy planned with Dr Nelson      #DVT Prophylaxis  - Lovenox SC

## 2024-02-15 LAB
ALBUMIN SERPL ELPH-MCNC: 2.3 G/DL — LOW (ref 3.3–5)
ALP SERPL-CCNC: 67 U/L — SIGNIFICANT CHANGE UP (ref 40–120)
ALT FLD-CCNC: 13 U/L — SIGNIFICANT CHANGE UP (ref 10–45)
ANION GAP SERPL CALC-SCNC: 5 MMOL/L — SIGNIFICANT CHANGE UP (ref 5–17)
AST SERPL-CCNC: 34 U/L — SIGNIFICANT CHANGE UP (ref 10–40)
BASOPHILS # BLD AUTO: 0.02 K/UL — SIGNIFICANT CHANGE UP (ref 0–0.2)
BASOPHILS NFR BLD AUTO: 0.2 % — SIGNIFICANT CHANGE UP (ref 0–2)
BILIRUB SERPL-MCNC: 0.6 MG/DL — SIGNIFICANT CHANGE UP (ref 0.2–1.2)
BUN SERPL-MCNC: 19 MG/DL — SIGNIFICANT CHANGE UP (ref 7–23)
CALCIUM SERPL-MCNC: 8.5 MG/DL — SIGNIFICANT CHANGE UP (ref 8.4–10.5)
CHLORIDE SERPL-SCNC: 106 MMOL/L — SIGNIFICANT CHANGE UP (ref 96–108)
CO2 SERPL-SCNC: 29 MMOL/L — SIGNIFICANT CHANGE UP (ref 22–31)
CREAT SERPL-MCNC: 1.02 MG/DL — SIGNIFICANT CHANGE UP (ref 0.5–1.3)
EGFR: 79 ML/MIN/1.73M2 — SIGNIFICANT CHANGE UP
EOSINOPHIL # BLD AUTO: 0.06 K/UL — SIGNIFICANT CHANGE UP (ref 0–0.5)
EOSINOPHIL NFR BLD AUTO: 0.7 % — SIGNIFICANT CHANGE UP (ref 0–6)
GLUCOSE SERPL-MCNC: 92 MG/DL — SIGNIFICANT CHANGE UP (ref 70–99)
HCT VFR BLD CALC: 39.9 % — SIGNIFICANT CHANGE UP (ref 39–50)
HGB BLD-MCNC: 13.6 G/DL — SIGNIFICANT CHANGE UP (ref 13–17)
IMM GRANULOCYTES NFR BLD AUTO: 0.7 % — SIGNIFICANT CHANGE UP (ref 0–0.9)
LYMPHOCYTES # BLD AUTO: 2.13 K/UL — SIGNIFICANT CHANGE UP (ref 1–3.3)
LYMPHOCYTES # BLD AUTO: 23.5 % — SIGNIFICANT CHANGE UP (ref 13–44)
MCHC RBC-ENTMCNC: 33.7 PG — SIGNIFICANT CHANGE UP (ref 27–34)
MCHC RBC-ENTMCNC: 34.1 GM/DL — SIGNIFICANT CHANGE UP (ref 32–36)
MCV RBC AUTO: 99 FL — SIGNIFICANT CHANGE UP (ref 80–100)
MONOCYTES # BLD AUTO: 0.85 K/UL — SIGNIFICANT CHANGE UP (ref 0–0.9)
MONOCYTES NFR BLD AUTO: 9.4 % — SIGNIFICANT CHANGE UP (ref 2–14)
NEUTROPHILS # BLD AUTO: 5.93 K/UL — SIGNIFICANT CHANGE UP (ref 1.8–7.4)
NEUTROPHILS NFR BLD AUTO: 65.5 % — SIGNIFICANT CHANGE UP (ref 43–77)
NRBC # BLD: 0 /100 WBCS — SIGNIFICANT CHANGE UP (ref 0–0)
PLATELET # BLD AUTO: 175 K/UL — SIGNIFICANT CHANGE UP (ref 150–400)
POTASSIUM SERPL-MCNC: 4.3 MMOL/L — SIGNIFICANT CHANGE UP (ref 3.5–5.3)
POTASSIUM SERPL-SCNC: 4.3 MMOL/L — SIGNIFICANT CHANGE UP (ref 3.5–5.3)
PROT SERPL-MCNC: 5.9 G/DL — LOW (ref 6–8.3)
RBC # BLD: 4.03 M/UL — LOW (ref 4.2–5.8)
RBC # FLD: 12 % — SIGNIFICANT CHANGE UP (ref 10.3–14.5)
SODIUM SERPL-SCNC: 140 MMOL/L — SIGNIFICANT CHANGE UP (ref 135–145)
WBC # BLD: 9.05 K/UL — SIGNIFICANT CHANGE UP (ref 3.8–10.5)
WBC # FLD AUTO: 9.05 K/UL — SIGNIFICANT CHANGE UP (ref 3.8–10.5)

## 2024-02-15 PROCEDURE — 99222 1ST HOSP IP/OBS MODERATE 55: CPT

## 2024-02-15 PROCEDURE — 99232 SBSQ HOSP IP/OBS MODERATE 35: CPT

## 2024-02-15 PROCEDURE — 90832 PSYTX W PT 30 MINUTES: CPT

## 2024-02-15 RX ORDER — LACTULOSE 10 G/15ML
20 SOLUTION ORAL DAILY
Refills: 0 | Status: DISCONTINUED | OUTPATIENT
Start: 2024-02-15 | End: 2024-02-27

## 2024-02-15 RX ORDER — POLYETHYLENE GLYCOL 3350 17 G/17G
17 POWDER, FOR SOLUTION ORAL EVERY 12 HOURS
Refills: 0 | Status: DISCONTINUED | OUTPATIENT
Start: 2024-02-15 | End: 2024-02-27

## 2024-02-15 RX ADMIN — ENOXAPARIN SODIUM 40 MILLIGRAM(S): 100 INJECTION SUBCUTANEOUS at 17:32

## 2024-02-15 RX ADMIN — CARBIDOPA AND LEVODOPA 1 TABLET(S): 25; 100 TABLET ORAL at 21:19

## 2024-02-15 RX ADMIN — RIVASTIGMINE 1 PATCH: 4.6 PATCH, EXTENDED RELEASE TRANSDERMAL at 08:50

## 2024-02-15 RX ADMIN — ENTACAPONE 200 MILLIGRAM(S): 200 TABLET, FILM COATED ORAL at 07:09

## 2024-02-15 RX ADMIN — RIVASTIGMINE 1 PATCH: 4.6 PATCH, EXTENDED RELEASE TRANSDERMAL at 17:32

## 2024-02-15 RX ADMIN — CARVEDILOL PHOSPHATE 3.12 MILLIGRAM(S): 80 CAPSULE, EXTENDED RELEASE ORAL at 17:31

## 2024-02-15 RX ADMIN — RIVASTIGMINE 1 PATCH: 4.6 PATCH, EXTENDED RELEASE TRANSDERMAL at 19:00

## 2024-02-15 RX ADMIN — Medication 200 MILLIGRAM(S): at 12:26

## 2024-02-15 RX ADMIN — Medication 6 MILLIGRAM(S): at 21:19

## 2024-02-15 RX ADMIN — LEVETIRACETAM 500 MILLIGRAM(S): 250 TABLET, FILM COATED ORAL at 17:31

## 2024-02-15 RX ADMIN — GABAPENTIN 100 MILLIGRAM(S): 400 CAPSULE ORAL at 21:22

## 2024-02-15 RX ADMIN — LIDOCAINE 1 PATCH: 4 CREAM TOPICAL at 07:09

## 2024-02-15 RX ADMIN — CARVEDILOL PHOSPHATE 3.12 MILLIGRAM(S): 80 CAPSULE, EXTENDED RELEASE ORAL at 07:09

## 2024-02-15 RX ADMIN — CARBIDOPA AND LEVODOPA 2 TABLET(S): 25; 100 TABLET ORAL at 12:26

## 2024-02-15 RX ADMIN — ENTACAPONE 200 MILLIGRAM(S): 200 TABLET, FILM COATED ORAL at 12:26

## 2024-02-15 RX ADMIN — Medication 81 MILLIGRAM(S): at 12:26

## 2024-02-15 RX ADMIN — AMLODIPINE BESYLATE 5 MILLIGRAM(S): 2.5 TABLET ORAL at 07:09

## 2024-02-15 RX ADMIN — LEVETIRACETAM 500 MILLIGRAM(S): 250 TABLET, FILM COATED ORAL at 07:09

## 2024-02-15 RX ADMIN — Medication 100 MILLIGRAM(S): at 21:20

## 2024-02-15 RX ADMIN — RIVASTIGMINE 1 PATCH: 4.6 PATCH, EXTENDED RELEASE TRANSDERMAL at 17:31

## 2024-02-15 RX ADMIN — CARBIDOPA AND LEVODOPA 2 TABLET(S): 25; 100 TABLET ORAL at 17:29

## 2024-02-15 RX ADMIN — ENTACAPONE 200 MILLIGRAM(S): 200 TABLET, FILM COATED ORAL at 17:31

## 2024-02-15 RX ADMIN — CARBIDOPA AND LEVODOPA 2 TABLET(S): 25; 100 TABLET ORAL at 07:08

## 2024-02-15 RX ADMIN — POLYETHYLENE GLYCOL 3350 17 GRAM(S): 17 POWDER, FOR SOLUTION ORAL at 17:31

## 2024-02-15 NOTE — CONSULT NOTE ADULT - SUBJECTIVE AND OBJECTIVE BOX
Movement Disorders Neurology  Consult Note    HPI:  "Nehemias" is a 72 y/o male with PMH of Parkinson's disease (diagnosed 7 years ago), HTN, 2 x CVA (1998, 2016, minimal residual), seizure (2019 - on keppra), presented to [facility] ED on 1/31 after 2 falls. (of note, patient was admitted for same complaints in 09/22/2022). Trauma w/u was negative for acute traumatic injury. (CT chest, CT abdomen/pelvis, CT c-spine, CT head, xray pelvis, xray chest). Further workup for fall negative. Neurology evaluated the patient and feels that he is having morning freeze episodes secondary to wearing off of sinemet because of lack of long-acting version at bedtime; Sinemet regimen changed with improvement (sinemet 25/100 2 tab at 7am, 12 pm and 5pm; sinemet ER 50/200 1 tab HS (10pm)).     Hospital course complicated by:  - COVID positive on admission  - completed RDV x 5 Day.    - Incidental find of 1.8 cm left apical lung mass, suspicious for malignancy-new from neck CT of 6/1/2023. Will require outpatient pulmonology follow-up for PET/CT and PFTs  - CT abdomen showed: 2 rounded foci in the transverse colon. Differential includes adherent stool versus colonic lesions. Will require outpatient colonoscopy with Dr. Nelson.    Patient was evaluated by PM&R and therapy for functional deficits and gait/ADL impairments and recommend acute rehabilitation.  Patient was medically optimized for discharge to [place] on 2/12/2023.    Patient reports that he had symptoms of PD about 10 years ago after he injured his right shoulder. He states he only has tremors in the left hand. He reports significant constipation. He has a history of GISELE, but no history of talking in sleep as an adult (did talk as a child). He denies hallucinations. He denies dizziness/lightheadedness when standing, and stays hydrated. He denies shuffling when walking. He denies freezing of gait.  No family history of Parkinson's disease.   He does not follow regularly with a Movement Disorders Specialist but goes to [facility] when he is not well.   He is not sure the benefit of levodopa.    Physical examination:  Awake and alert, follows commands, provides history  Face is mildly masked. Voice is mildly hypophonic.  Mild, intermittent resting tremors of the left hand and occasional resting tremors of the right hand. Mild jaw tremor, intermittent.  Moderate bradykinesia, left more than right.  Mild rigidity, right more than left.   Gait deferred

## 2024-02-15 NOTE — PROGRESS NOTE ADULT - ASSESSMENT
ASSESSMENT/PLAN  74 y/o male with PMH of Parkinson's disease (diagnosed 7 years ago), HTN, 2 x CVA (1998, 2016), seizure (2019 - on keppra), presented to [facility] ED on 1/31 after 2 falls. Fall w/u negative - attribute fall to PD regimen.    #Parkinson's Disease now with gait Instability, ADL impairments and Functional impairments  - Continue Comprehensive Rehab Program of PT/OT/SLP    ------------------------------------------------------  Parkinson's related motor symptoms    #Dyskinesia/Rigidity/Bradykinesia  - Sinemet 25/100 2 tabs 7am, 12pm, 5pm  - Entacapone 200 1 tab at 7am, 12pm and 5pm   - Sinemet ER 50/200 1 tab HS  - Rivastigmine 9.5mg transdermal   - Movement disorders following    --------------------------------------------------------  Parkinson's related non-motor symptoms    #Orthostatic hypotension  -Monitor OH vital signs  -Currently on carvedilol and norvasc - re-evaluate continued need    #Dizziness may be OH related  - Meclizine 12.5 BID PRN - discontinued meclizine due to sedation 2/13 - tolerating the change well  -Dizziness improving     #Sleep/Mood  - Trazodone 100 at bedtime   - Melatonin 6mg HS  -Stop ambien as may be contributing to dizziness 2/13 - tolerating the change well  - Recreation Therapy  - Neuropsych consult    #Pain control  - Tylenol PRN, Lidocaine patch   - Muscle relaxant: Methocarbamol 750 TID PRN - stopped due to dizziness   -Continue Gabapentin 100mg at bedtime for LBP 2/13 - pain improvement noted     #GI/Bowel Management/Constipation  - Senna at bedtime, Miralax QD  - Lactulose PRN    #Bladder management  - Continue to monitor PVR q 8 hours (SC if > 400)  - Monitor UO    ----------------------------------------------------------  Concurrent medical problems    #Prior CVA  - ASA 81  - previously on atorvastatin 40 (now off since 1/2023) - LDL 80    #Seizure  - Keppra 500 BID    #HTN  - Amlodipine 5mg daily   - Carvedilol 3.125 BID  - Reevaluate need due to possible symptomatic OH - currently denies OH symptoms     #history of Diabetes per chart  - A1C 5.9  - FS has been <150  - Continue monitoring glucose via lab    #Gout  - Allopurinol 200    #COVID positive (1/31) - completed isolation  - completed RDV x 5 Day    #Incidental finds:  - 1.8 cm left apical lung mass, suspicious for malignancy-new from neck CT of 6/1/2023. ****Outpatient pulmonology follow-up for PET/CT and PFTs  - 2 rounded foci in the transverse colon. Differential includes adherent stool versus colonic lesions.****outpatient colonoscopy with Dr. Nelosn.    #DVT Prophylaxis  - Lovenox  - TEDs     #Skin:  -No active issues at this time    FEN   - Diet - Regular + Thins  - Dysphagia  SLP - evaluation and treatment    Precautions / PROPHYLAXIS:   - Falls, Cardiac, Seizure   - ortho: Weight bearing status: WBAT   - Lungs: Aspiration  - Pressure injury/Skin:  OOB to Chair, PT/OT

## 2024-02-15 NOTE — PROGRESS NOTE ADULT - SUBJECTIVE AND OBJECTIVE BOX
Patient is a 73y old  Male who presents with a chief complaint of Parkinson's Disease (14 Feb 2024 09:56)      Patient seen and examined at bedside. No events overnight. Patient without acute complaints, denies chest pain, sob, headache, dizziness or lightheadedness has improved when standing. Slept well overnight.    ALLERGIES:  penicillin (Unknown)  sulfa drugs (Unknown)  cefaclor (Unknown)    MEDICATIONS  (STANDING):  allopurinol 200 milliGRAM(s) Oral daily  amLODIPine   Tablet 5 milliGRAM(s) Oral daily  aspirin  chewable 81 milliGRAM(s) Oral daily  carbidopa/levodopa  25/100 2 Tablet(s) Oral <User Schedule>  carbidopa/levodopa CR 50/200 1 Tablet(s) Oral <User Schedule>  carvedilol 3.125 milliGRAM(s) Oral every 12 hours  enoxaparin Injectable 40 milliGRAM(s) SubCutaneous every 24 hours  entacapone 200 milliGRAM(s) Oral <User Schedule>  gabapentin 100 milliGRAM(s) Oral at bedtime  levETIRAcetam 500 milliGRAM(s) Oral two times a day  lidocaine   4% Patch 1 Patch Transdermal daily  melatonin 6 milliGRAM(s) Oral at bedtime  polyethylene glycol 3350 17 Gram(s) Oral every 24 hours  rivastigmine patch  9.5 mG/24 Hr(s). 1 Patch Transdermal every 24 hours  senna 2 Tablet(s) Oral at bedtime  traZODone 100 milliGRAM(s) Oral at bedtime    MEDICATIONS  (PRN):  acetaminophen     Tablet .. 650 milliGRAM(s) Oral every 6 hours PRN Mild Pain (1 - 3)  lactulose Syrup 20 Gram(s) Oral daily PRN Constipation    Vital Signs Last 24 Hrs  T(F): 97.5 (15 Feb 2024 08:43), Max: 97.7 (14 Feb 2024 22:10)  HR: 82 (15 Feb 2024 08:43) (70 - 96)  BP: 124/74 (15 Feb 2024 08:43) (124/74 - 126/71)  RR: 16 (15 Feb 2024 08:43) (16 - 16)  SpO2: 94% (15 Feb 2024 08:43) (94% - 94%)  I&O's Summary      PHYSICAL EXAM:  GENERAL: NAD, laying in bed  HEAD:  Atraumatic, Normocephalic  EYES: PEERL, conjunctiva and sclera clear  ENMT: Moist mucous membranes, Supple, No JVD  CHEST/LUNG: Clear to auscultation bilaterally, good air entry, non-labored breathing  HEART: RRR; S1/S2, No murmur  ABDOMEN: Soft, Nontender, Nondistended; Bowel sounds present  EXTREMITIES: No calf tenderness, No cyanosis, No edema  SKIN: perfused, intact  PSYCH: Normal mood, Normal affect  NERVOUS SYSTEM:  A/O x3, Good concentration    LABS:                        13.6   9.05  )-----------( 175      ( 15 Feb 2024 06:33 )             39.9     02-15    140  |  106  |  19  ----------------------------<  92  4.3   |  29  |  1.02    Ca    8.5      15 Feb 2024 06:33    TPro  5.9  /  Alb  2.3  /  TBili  0.6  /  DBili  x   /  AST  34  /  ALT  13  /  AlkPhos  67  02-15              02-02 Chol 144 mg/dL LDL -- HDL 46 mg/dL Trig 93 mg/dL                  Urinalysis Basic - ( 15 Feb 2024 06:33 )    Color: x / Appearance: x / SG: x / pH: x  Gluc: 92 mg/dL / Ketone: x  / Bili: x / Urobili: x   Blood: x / Protein: x / Nitrite: x   Leuk Esterase: x / RBC: x / WBC x   Sq Epi: x / Non Sq Epi: x / Bacteria: x            RADIOLOGY & ADDITIONAL TESTS:    Care Discussed with Consultants/Other Providers:

## 2024-02-15 NOTE — PROGRESS NOTE ADULT - ASSESSMENT
72 y/o male with PMH of Parkinson's disease (diagnosed 7 years ago), HTN, 2 x CVA (1998, 2016), seizure (2019 - on keppra), presented to [facility] ED on 1/31 after 2 falls. Fall w/u negative - fall attributed to PD regimen. Hospital course complicated by COVID positive on admission completed RDV x 5 Day.  Imaging with Incidental finding of 1.8 cm left apical lung mass, suspicious for malignancy-new from neck CT of 6/1/2023 and 2 rounded foci in the transverse colon. Differential includes adherent stool versus colonic lesions. Will require outpatient colonoscopy with Dr. Nelson. Patient was evaluated by PM&R and therapy for functional deficits and gait/ADL impairments and recommend acute rehabilitation.  Patient was medically optimized for discharge to [place] on 2/12/2023    #Parkinson's related motor symptoms  #Dyskinesia/Rigidity/Bradykinesia  - Sinemet 25/100 2 tabs 7am, 12pm, 5pm  - Sinemet ER 50/200 1 tab HS  - Entacapone 200 1 tab at 7am, 12pm and 5pm   - Rivastigmine 9.5mg transdermal  - Fall precaution   - Movement disorders specialist to see  - Pain control and bowel regimen per rehab team   - Comprehensive rehab program with PT/OT/SLP     #Orthostatic hypotension  - Monitor OH vital signs  - TEDS and abd binders   - Currently on coreg and norvasc -re-evaluate continued need    #Dizziness   - may be OH related  - Meclizine 12.5 BID PRN - meclizine d/c'd due to sedation 2/13  - Monitor symptoms     #Prior CVA  - Continue ASA 81  - previously on atorvastatin 40 (now off since 1/2023) - LDL 80  - Follow up with PMD to repeat lipid panel and resume atorvastatin as indicated     #Seizure  - Continue Keppra   - Seizure precaution    #HTN  - Continue amlodipine 5mg daily (reduced dose) and coreg 3.125mg BID   -  Monitor BP   - Reevaluate need due to possible symptomatic OH    #history of Diabetes per chart  - Follow up HbA1C  - FS 81 on fasting blood work 2/13  - Monitor glucose via lab    #Gout  - Allopurinol 200    #COVID positive (1/31) - completed isolation  - completed RDV x 5 Day  - Repeat COVID PCR positive 2/12   - Supportive care PRN     #Incidental Findings  - 1.8 cm left apical lung mass, suspicious for malignancy-new from neck CT of 6/1/2023. ****Outpatient pulmonology follow-up for PET/CT and PFTs  - 2 rounded foci in the transverse colon. Differential includes adherent stool versus colonic lesions.****outpatient colonoscopy with Dr. Nelson.    #DVT Prophylaxis  - Lovenox SC

## 2024-02-15 NOTE — PROGRESS NOTE ADULT - SUBJECTIVE AND OBJECTIVE BOX
Pt was seen for 30 minutes for supportive tx. Pt c/o fatigue. Reviewed chart, approached Pt for an initial consult, introduced the role of neuropsychology in the rehab team, and explained the nature and purpose of the consult. Pt is a 72 y/o male with PMH of Parkinson's disease (diagnosed 7 years ago), HTN, 2 x CVA (1998, 2016, minimal residual), seizure (2019 - on keppra), presented to [facility] ED on 1/31 after 2 falls. (of note, patient was admitted for same complaints in 09/22/2022). Trauma w/u was negative for acute traumatic injury. (CT chest, CT abdomen/pelvis, CT c-spine, CT head, xray pelvis, xray chest). Further workup for fall negative. Neurology evaluated the patient and feels that he is having morning freeze episodes secondary to wearing off of sinemet because of lack of long-acting version at bedtime. Pt agreed to participate, he was well related and open to discuss his present health issues. Session focused on establishing rapport with Pt, getting familiarized with Pt’s personal hx, and assessing his current emotional functioning. Pt provided relevant  information about his medical hx and social hx. Additionally, Pt answered all questions during the clinical interview in order to elicit emotional symptoms. Pt reported experiencing fatigue, and worry. Pt express concern about his health status and aging issues. Support and encouragement were provided.

## 2024-02-15 NOTE — PROGRESS NOTE ADULT - ASSESSMENT
Pt alert, attentive, intact expressive language, thought processes - goal-directed, no abnormal thought contents observed, depressed affect, "tired" mood, denied AH/VH, denied SI/HI/I/P, calm behavior. Plan: Continue the assessment process. Provide individual supportive tx.

## 2024-02-15 NOTE — PROGRESS NOTE ADULT - SUBJECTIVE AND OBJECTIVE BOX
SUBJECTIVE/ROS: Patient evaluated while in bed. No acute events overnight. He states he is sleeping better but was woken up early this morning. He otherwise denies chest pain, fever, chills, nausea, vomiting, abdominal pain, headache, or BLE pain.     HPI:  74 y/o male with PMH of Parkinson's disease (diagnosed 7 years ago), HTN, 2 x CVA (1998, 2016, minimal residual), seizure (2019 - on keppra), presented to [facility] ED on 1/31 after 2 falls. (of note, patient was admitted for same complaints in 09/22/2022). Trauma w/u was negative for acute traumatic injury. (CT chest, CT abdomen/pelvis, CT c-spine, CT head, xray pelvis, xray chest). Further workup for fall negative. Neurology evaluated the patient and feels that he is having morning freeze episodes secondary to wearing off of sinemet because of lack of long-acting version at bedtime; Sinemet regimen changed with improvement (sinemet 25/100 2 tab at 7am, 12 pm and 5pm; sinemet ER 50/200 1 tab HS (10pm)).     Hospital course complicated by:  - COVID positive on admission  - completed RDV x 5 Day.    - Incidental find of 1.8 cm left apical lung mass, suspicious for malignancy-new from neck CT of 6/1/2023. Will require outpatient pulmonology follow-up for PET/CT and PFTs  - CT abdomen showed: 2 rounded foci in the transverse colon. Differential includes adherent stool versus colonic lesions. Will require outpatient colonoscopy with Dr. Nelson.    Patient was evaluated by PM&R and therapy for functional deficits and gait/ADL impairments and recommend acute rehabilitation.  Patient was medically optimized for discharge to [place] on 2/12/2023    Vital Signs Last 24 Hrs  T(C): 36.4 (15 Feb 2024 08:43), Max: 36.5 (14 Feb 2024 22:10)  T(F): 97.5 (15 Feb 2024 08:43), Max: 97.7 (14 Feb 2024 22:10)  HR: 82 (15 Feb 2024 08:43) (70 - 96)  BP: 124/74 (15 Feb 2024 08:43) (124/74 - 126/71)  BP(mean): --  RR: 16 (15 Feb 2024 08:43) (16 - 16)  SpO2: 94% (15 Feb 2024 08:43) (94% - 94%)    Parameters below as of 15 Feb 2024 08:43  Patient On (Oxygen Delivery Method): room air        PHYSICAL EXAM  Constitutional - NAD, Comfortable  HEENT - NCAT, EOMI  Neck - Supple, No limited ROM  Chest - Breathing comfortably   Extremities - No C/C/E   Neurologic Exam - alert, oriented to self, place, and time, EOMI, mild hypomimia, mild hypophonia, intermittent rest and postural tremor of the left hand, mild bradykinesia worse on the left. Normal coordination. No sensory deficits.         LABS:                          13.6   9.05  )-----------( 175      ( 15 Feb 2024 06:33 )             39.9     02-15    140  |  106  |  19  ----------------------------<  92  4.3   |  29  |  1.02    Ca    8.5      15 Feb 2024 06:33    TPro  5.9<L>  /  Alb  2.3<L>  /  TBili  0.6  /  DBili  x   /  AST  34  /  ALT  13  /  AlkPhos  67  02-15    LIVER FUNCTIONS - ( 15 Feb 2024 06:33 )  Alb: 2.3 g/dL / Pro: 5.9 g/dL / ALK PHOS: 67 U/L / ALT: 13 U/L / AST: 34 U/L / GGT: x                   MEDICATIONS  (STANDING):  allopurinol 200 milliGRAM(s) Oral daily  amLODIPine   Tablet 5 milliGRAM(s) Oral daily  aspirin  chewable 81 milliGRAM(s) Oral daily  carbidopa/levodopa  25/100 2 Tablet(s) Oral <User Schedule>  carbidopa/levodopa CR 50/200 1 Tablet(s) Oral <User Schedule>  carvedilol 3.125 milliGRAM(s) Oral every 12 hours  enoxaparin Injectable 40 milliGRAM(s) SubCutaneous every 24 hours  entacapone 200 milliGRAM(s) Oral <User Schedule>  gabapentin 100 milliGRAM(s) Oral at bedtime  levETIRAcetam 500 milliGRAM(s) Oral two times a day  lidocaine   4% Patch 1 Patch Transdermal daily  melatonin 6 milliGRAM(s) Oral at bedtime  polyethylene glycol 3350 17 Gram(s) Oral every 24 hours  rivastigmine patch  9.5 mG/24 Hr(s). 1 Patch Transdermal every 24 hours  senna 2 Tablet(s) Oral at bedtime  traZODone 100 milliGRAM(s) Oral at bedtime    MEDICATIONS  (PRN):  acetaminophen     Tablet .. 650 milliGRAM(s) Oral every 6 hours PRN Mild Pain (1 - 3)  lactulose Syrup 20 Gram(s) Oral daily PRN Constipation

## 2024-02-16 PROCEDURE — 93010 ELECTROCARDIOGRAM REPORT: CPT

## 2024-02-16 PROCEDURE — 99232 SBSQ HOSP IP/OBS MODERATE 35: CPT

## 2024-02-16 RX ADMIN — Medication 100 MILLIGRAM(S): at 22:02

## 2024-02-16 RX ADMIN — RIVASTIGMINE 1 PATCH: 4.6 PATCH, EXTENDED RELEASE TRANSDERMAL at 17:47

## 2024-02-16 RX ADMIN — GABAPENTIN 100 MILLIGRAM(S): 400 CAPSULE ORAL at 22:02

## 2024-02-16 RX ADMIN — RIVASTIGMINE 1 PATCH: 4.6 PATCH, EXTENDED RELEASE TRANSDERMAL at 17:43

## 2024-02-16 RX ADMIN — RIVASTIGMINE 1 PATCH: 4.6 PATCH, EXTENDED RELEASE TRANSDERMAL at 19:00

## 2024-02-16 RX ADMIN — LIDOCAINE 1 PATCH: 4 CREAM TOPICAL at 12:21

## 2024-02-16 RX ADMIN — ENTACAPONE 200 MILLIGRAM(S): 200 TABLET, FILM COATED ORAL at 12:21

## 2024-02-16 RX ADMIN — ENOXAPARIN SODIUM 40 MILLIGRAM(S): 100 INJECTION SUBCUTANEOUS at 17:36

## 2024-02-16 RX ADMIN — CARBIDOPA AND LEVODOPA 1 TABLET(S): 25; 100 TABLET ORAL at 22:02

## 2024-02-16 RX ADMIN — CARBIDOPA AND LEVODOPA 2 TABLET(S): 25; 100 TABLET ORAL at 06:54

## 2024-02-16 RX ADMIN — ENTACAPONE 200 MILLIGRAM(S): 200 TABLET, FILM COATED ORAL at 17:33

## 2024-02-16 RX ADMIN — Medication 200 MILLIGRAM(S): at 12:21

## 2024-02-16 RX ADMIN — AMLODIPINE BESYLATE 5 MILLIGRAM(S): 2.5 TABLET ORAL at 06:54

## 2024-02-16 RX ADMIN — CARVEDILOL PHOSPHATE 3.12 MILLIGRAM(S): 80 CAPSULE, EXTENDED RELEASE ORAL at 06:54

## 2024-02-16 RX ADMIN — LIDOCAINE 1 PATCH: 4 CREAM TOPICAL at 20:00

## 2024-02-16 RX ADMIN — CARVEDILOL PHOSPHATE 3.12 MILLIGRAM(S): 80 CAPSULE, EXTENDED RELEASE ORAL at 17:37

## 2024-02-16 RX ADMIN — POLYETHYLENE GLYCOL 3350 17 GRAM(S): 17 POWDER, FOR SOLUTION ORAL at 17:36

## 2024-02-16 RX ADMIN — Medication 6 MILLIGRAM(S): at 22:02

## 2024-02-16 RX ADMIN — LEVETIRACETAM 500 MILLIGRAM(S): 250 TABLET, FILM COATED ORAL at 07:02

## 2024-02-16 RX ADMIN — CARBIDOPA AND LEVODOPA 2 TABLET(S): 25; 100 TABLET ORAL at 12:20

## 2024-02-16 RX ADMIN — RIVASTIGMINE 1 PATCH: 4.6 PATCH, EXTENDED RELEASE TRANSDERMAL at 07:13

## 2024-02-16 RX ADMIN — ENTACAPONE 200 MILLIGRAM(S): 200 TABLET, FILM COATED ORAL at 07:03

## 2024-02-16 RX ADMIN — Medication 81 MILLIGRAM(S): at 12:21

## 2024-02-16 RX ADMIN — LEVETIRACETAM 500 MILLIGRAM(S): 250 TABLET, FILM COATED ORAL at 17:36

## 2024-02-16 RX ADMIN — CARBIDOPA AND LEVODOPA 2 TABLET(S): 25; 100 TABLET ORAL at 17:33

## 2024-02-16 RX ADMIN — LACTULOSE 20 GRAM(S): 10 SOLUTION ORAL at 12:20

## 2024-02-16 NOTE — PROGRESS NOTE ADULT - ASSESSMENT
ASSESSMENT/PLAN  74 y/o male with PMH of Parkinson's disease (diagnosed 7 years ago), HTN, 2 x CVA (1998, 2016), seizure (2019 - on keppra), presented to [facility] ED on 1/31 after 2 falls. Fall w/u negative - attribute fall to PD regimen.    #Parkinson's Disease now with gait Instability, ADL impairments and Functional impairments  - Continue Comprehensive Rehab Program of PT/OT/SLP    ------------------------------------------------------  Parkinson's related motor symptoms    #Dyskinesia/Rigidity/Bradykinesia  - Sinemet 25/100 2 tabs 7am, 12pm, 5pm  - Entacapone 200 1 tab at 7am, 12pm and 5pm   - Sinemet ER 50/200 1 tab HS  - Rivastigmine 9.5mg transdermal   - Movement disorders following    --------------------------------------------------------  Parkinson's related non-motor symptoms    #Orthostatic hypotension  -Monitor OH vital signs  -Currently on carvedilol and norvasc - re-evaluate continued need    #Dizziness may be OH related  - Meclizine 12.5 BID PRN - discontinued meclizine due to sedation 2/13 - tolerating the change well  -Dizziness improving     #Sleep/Mood  - Trazodone 100 at bedtime   - Melatonin 6mg HS  -Stop ambien as may be contributing to dizziness 2/13 - tolerating the change well  - Recreation Therapy  - Neuropsych consult    #Pain control  - Tylenol PRN, Lidocaine patch   - Muscle relaxant: Methocarbamol 750 TID PRN - stopped due to dizziness   -Continue Gabapentin 100mg at bedtime for LBP 2/13 - pain improvement noted     #GI/Bowel Management/Constipation  - Senna at bedtime, Miralax QD  - Lactulose PRN    #Bladder management  - Continue to monitor PVR q 8 hours (SC if > 400)  - Monitor UO    ----------------------------------------------------------  Concurrent medical problems    #Prior CVA  - ASA 81  - previously on atorvastatin 40 (now off since 1/2023) - LDL 80    #Seizure  - Keppra 500 BID    #HTN  - Amlodipine 5mg daily   - Carvedilol 3.125 BID  - Reevaluate need due to possible symptomatic OH - denies OH symptoms     #history of Diabetes per chart  - A1C 5.9  - FS has been <150  - Continue monitoring glucose via lab    #Gout  - Allopurinol 200    #COVID positive (1/31) - completed isolation  - completed RDV x 5 Day    #Incidental finds:  - 1.8 cm left apical lung mass, suspicious for malignancy-new from neck CT of 6/1/2023. ****Outpatient pulmonology follow-up for PET/CT and PFTs  - 2 rounded foci in the transverse colon. Differential includes adherent stool versus colonic lesions.****outpatient colonoscopy with Dr. Nelson.    #DVT Prophylaxis  - Lovenox  - TEDs     #Skin:  -No active issues at this time    FEN   - Diet - Regular + Thins  - Dysphagia  SLP - evaluation and treatment    Precautions / PROPHYLAXIS:   - Falls, Cardiac, Seizure   - ortho: Weight bearing status: WBAT   - Lungs: Aspiration  - Pressure injury/Skin:  OOB to Chair, PT/OT

## 2024-02-16 NOTE — PROGRESS NOTE ADULT - SUBJECTIVE AND OBJECTIVE BOX
SUBJECTIVE/ROS: Patient evaluated while in OT. No acute events overnight. He states he feels better after sleeping longer this morning. Low back pain much improved on gabapentin at bedtime. No symptomatic orthostasis. He denies chest pain, fever, chills, nausea, vomiting, abdominal pain, headache, or BLE pain.     HPI:  72 y/o male with PMH of Parkinson's disease (diagnosed 7 years ago), HTN, 2 x CVA (1998, 2016, minimal residual), seizure (2019 - on keppra), presented to [facility] ED on 1/31 after 2 falls. (of note, patient was admitted for same complaints in 09/22/2022). Trauma w/u was negative for acute traumatic injury. (CT chest, CT abdomen/pelvis, CT c-spine, CT head, xray pelvis, xray chest). Further workup for fall negative. Neurology evaluated the patient and feels that he is having morning freeze episodes secondary to wearing off of sinemet because of lack of long-acting version at bedtime; Sinemet regimen changed with improvement (sinemet 25/100 2 tab at 7am, 12 pm and 5pm; sinemet ER 50/200 1 tab HS (10pm)).     Hospital course complicated by:  - COVID positive on admission  - completed RDV x 5 Day.    - Incidental find of 1.8 cm left apical lung mass, suspicious for malignancy-new from neck CT of 6/1/2023. Will require outpatient pulmonology follow-up for PET/CT and PFTs  - CT abdomen showed: 2 rounded foci in the transverse colon. Differential includes adherent stool versus colonic lesions. Will require outpatient colonoscopy with Dr. Nelson.    Patient was evaluated by PM&R and therapy for functional deficits and gait/ADL impairments and recommend acute rehabilitation.  Patient was medically optimized for discharge to [facility] on 2/12/2023    Vital Signs Last 24 Hrs  T(C): 36.3 (16 Feb 2024 07:36), Max: 36.3 (15 Feb 2024 21:12)  T(F): 97.4 (16 Feb 2024 07:36), Max: 97.4 (15 Feb 2024 21:12)  HR: 73 (16 Feb 2024 07:36) (73 - 88)  BP: 128/80 (16 Feb 2024 07:36) (114/73 - 128/80)  BP(mean): --  RR: 16 (16 Feb 2024 07:36) (16 - 16)  SpO2: 95% (16 Feb 2024 07:36) (93% - 95%)    Parameters below as of 16 Feb 2024 07:36  Patient On (Oxygen Delivery Method): room air    PHYSICAL EXAM  Constitutional - NAD, Comfortable  HEENT - NCAT, EOMI  Neck - Supple, No limited ROM  Chest - Breathing comfortably   Extremities - No C/C/E   Neurologic Exam - alert, oriented to self, place, and time, EOMI, mild hypomimia, mild hypophonia, intermittent rest and postural tremor of the left hand, mild bradykinesia worse on the left. Normal coordination. Fatigue on repeated shoulder elevation with 3lb weight on the right side (hx of shoulder cuff tendon tear). Able to raise from the chair without using hands. No sensory deficits.     MEDICATIONS  (STANDING):  allopurinol 200 milliGRAM(s) Oral daily  amLODIPine   Tablet 5 milliGRAM(s) Oral daily  aspirin  chewable 81 milliGRAM(s) Oral daily  carbidopa/levodopa  25/100 2 Tablet(s) Oral <User Schedule>  carbidopa/levodopa CR 50/200 1 Tablet(s) Oral <User Schedule>  carvedilol 3.125 milliGRAM(s) Oral every 12 hours  enoxaparin Injectable 40 milliGRAM(s) SubCutaneous every 24 hours  entacapone 200 milliGRAM(s) Oral <User Schedule>  gabapentin 100 milliGRAM(s) Oral at bedtime  lactulose Syrup 20 Gram(s) Oral daily  levETIRAcetam 500 milliGRAM(s) Oral two times a day  lidocaine   4% Patch 1 Patch Transdermal daily  melatonin 6 milliGRAM(s) Oral at bedtime  polyethylene glycol 3350 17 Gram(s) Oral every 12 hours  rivastigmine patch  9.5 mG/24 Hr(s). 1 Patch Transdermal every 24 hours  senna 2 Tablet(s) Oral at bedtime  traZODone 100 milliGRAM(s) Oral at bedtime    MEDICATIONS  (PRN):  acetaminophen     Tablet .. 650 milliGRAM(s) Oral every 6 hours PRN Mild Pain (1 - 3)

## 2024-02-16 NOTE — CHART NOTE - NSCHARTNOTEFT_GEN_A_CORE
NUTRITION FOLLOW UP    SOURCE: Patient [X]   Family [ ]    Medical Record [X]    DIET: Diet, Regular:   Supplement Feeding Modality:  Oral  Ensure Plus High Protein Cans or Servings Per Day:  1       Frequency:  Three Times a day (02-13-24 @ 16:13) [Active]  ALLERGIES: NKFA    IMPRESSION:  Met with pt this afternoon at bedside. Pt was seated upright in chair and was able to articulate his nutrition hx well. Pt reported good appetite and taking adequate POs, with good acceptance Ensure plus HP TID. Pt denied N/V/D/C, stated last BM 2/15. RD encouraged continued adequate protein and Ensure consumption to assist with regaining strength and maintaining muscle mass. Pt was accepting to RD suggestion and engaged.    PATIENT REPORT: [X] other: no GI distress    PO INTAKE: % per RN flowsheets    CURRENT WEIGHT: 147 lbs (2/12)  WEIGHT HX: N/A    PERTINENT MEDS:   Pertinent Medications: MEDICATIONS  (STANDING):  allopurinol 200 milliGRAM(s) Oral daily  amLODIPine   Tablet 5 milliGRAM(s) Oral daily  aspirin  chewable 81 milliGRAM(s) Oral daily  carbidopa/levodopa  25/100 2 Tablet(s) Oral <User Schedule>  carbidopa/levodopa CR 50/200 1 Tablet(s) Oral <User Schedule>  carvedilol 3.125 milliGRAM(s) Oral every 12 hours  enoxaparin Injectable 40 milliGRAM(s) SubCutaneous every 24 hours  entacapone 200 milliGRAM(s) Oral <User Schedule>  gabapentin 100 milliGRAM(s) Oral at bedtime  lactulose Syrup 20 Gram(s) Oral daily  levETIRAcetam 500 milliGRAM(s) Oral two times a day  lidocaine   4% Patch 1 Patch Transdermal daily  melatonin 6 milliGRAM(s) Oral at bedtime  polyethylene glycol 3350 17 Gram(s) Oral every 12 hours  rivastigmine patch  9.5 mG/24 Hr(s). 1 Patch Transdermal every 24 hours  senna 2 Tablet(s) Oral at bedtime  traZODone 100 milliGRAM(s) Oral at bedtime    MEDICATIONS  (PRN):  acetaminophen     Tablet .. 650 milliGRAM(s) Oral every 6 hours PRN Mild Pain (1 - 3)    PERTINENT LABS:  02-15 Na140 mmol/L Glu 92 mg/dL K+ 4.3 mmol/L Cr  1.02 mg/dL BUN 19 mg/dL 02-15 Alb 2.3 g/dL<L> 02-02 Chol 144 mg/dL LDL --    HDL 46 mg/dL Trig 93 mg/dL    SKIN: no pressure injury per RN flowsheets    EDEMA: no edema per RN flowsheets    ESTIMATED NEEDS:   [X] no change since previous assessment     PREVIOUS NUTRITION DIAGNOSIS: Severe protein-kcal malnutrition    NUTRITION DIAGNOSIS is: [X] Ongoing - addressed with Ensure plus HP TID    NEW NUTRITION DIAGNOSIS: N/A    MONITORING AND EVALUATION:   Current diet order is appropriate and is well tolerated, will continue to monitor:  - Food and nutrient intake/POs  - Nutrition related lab values  - Weight/weight trends  - GI functions  - Supplement acceptance    NUTRITION RECOMMENDATIONS:   1. Continue with Regular diet  - Continue to encourage adequate PO and protein intake  - Honor food preferences  - Appreciate continued PO intake % documentation in RN flowsheets  2. Continue with Ensure plus HP TID, as tolerated  - Provides 350 kcal, 20 g pro per serving  3. Appreciate weekly weight trends  4. Continue with current bowel regimen, prn    Brooke Mcneil RDN also available via TEAMS

## 2024-02-17 PROCEDURE — 99232 SBSQ HOSP IP/OBS MODERATE 35: CPT

## 2024-02-17 PROCEDURE — 73630 X-RAY EXAM OF FOOT: CPT | Mod: 26,RT

## 2024-02-17 RX ADMIN — CARBIDOPA AND LEVODOPA 2 TABLET(S): 25; 100 TABLET ORAL at 17:19

## 2024-02-17 RX ADMIN — LIDOCAINE 1 PATCH: 4 CREAM TOPICAL at 12:32

## 2024-02-17 RX ADMIN — LACTULOSE 20 GRAM(S): 10 SOLUTION ORAL at 12:31

## 2024-02-17 RX ADMIN — ENTACAPONE 200 MILLIGRAM(S): 200 TABLET, FILM COATED ORAL at 12:31

## 2024-02-17 RX ADMIN — CARBIDOPA AND LEVODOPA 1 TABLET(S): 25; 100 TABLET ORAL at 22:00

## 2024-02-17 RX ADMIN — ENTACAPONE 200 MILLIGRAM(S): 200 TABLET, FILM COATED ORAL at 06:56

## 2024-02-17 RX ADMIN — ENTACAPONE 200 MILLIGRAM(S): 200 TABLET, FILM COATED ORAL at 17:19

## 2024-02-17 RX ADMIN — RIVASTIGMINE 1 PATCH: 4.6 PATCH, EXTENDED RELEASE TRANSDERMAL at 06:50

## 2024-02-17 RX ADMIN — CARVEDILOL PHOSPHATE 3.12 MILLIGRAM(S): 80 CAPSULE, EXTENDED RELEASE ORAL at 06:57

## 2024-02-17 RX ADMIN — Medication 200 MILLIGRAM(S): at 12:31

## 2024-02-17 RX ADMIN — ENOXAPARIN SODIUM 40 MILLIGRAM(S): 100 INJECTION SUBCUTANEOUS at 17:48

## 2024-02-17 RX ADMIN — Medication 81 MILLIGRAM(S): at 12:31

## 2024-02-17 RX ADMIN — Medication 100 MILLIGRAM(S): at 22:00

## 2024-02-17 RX ADMIN — LIDOCAINE 1 PATCH: 4 CREAM TOPICAL at 07:17

## 2024-02-17 RX ADMIN — LEVETIRACETAM 500 MILLIGRAM(S): 250 TABLET, FILM COATED ORAL at 17:48

## 2024-02-17 RX ADMIN — LEVETIRACETAM 500 MILLIGRAM(S): 250 TABLET, FILM COATED ORAL at 06:56

## 2024-02-17 RX ADMIN — CARBIDOPA AND LEVODOPA 2 TABLET(S): 25; 100 TABLET ORAL at 06:56

## 2024-02-17 RX ADMIN — LIDOCAINE 1 PATCH: 4 CREAM TOPICAL at 19:32

## 2024-02-17 RX ADMIN — AMLODIPINE BESYLATE 5 MILLIGRAM(S): 2.5 TABLET ORAL at 06:56

## 2024-02-17 RX ADMIN — Medication 6 MILLIGRAM(S): at 21:59

## 2024-02-17 RX ADMIN — RIVASTIGMINE 1 PATCH: 4.6 PATCH, EXTENDED RELEASE TRANSDERMAL at 17:52

## 2024-02-17 RX ADMIN — CARVEDILOL PHOSPHATE 3.12 MILLIGRAM(S): 80 CAPSULE, EXTENDED RELEASE ORAL at 17:48

## 2024-02-17 RX ADMIN — POLYETHYLENE GLYCOL 3350 17 GRAM(S): 17 POWDER, FOR SOLUTION ORAL at 17:48

## 2024-02-17 RX ADMIN — CARBIDOPA AND LEVODOPA 2 TABLET(S): 25; 100 TABLET ORAL at 12:31

## 2024-02-17 RX ADMIN — RIVASTIGMINE 1 PATCH: 4.6 PATCH, EXTENDED RELEASE TRANSDERMAL at 19:33

## 2024-02-17 RX ADMIN — GABAPENTIN 100 MILLIGRAM(S): 400 CAPSULE ORAL at 22:00

## 2024-02-17 RX ADMIN — RIVASTIGMINE 1 PATCH: 4.6 PATCH, EXTENDED RELEASE TRANSDERMAL at 17:53

## 2024-02-17 NOTE — CHART NOTE - NSCHARTNOTEFT_GEN_A_CORE
Called ultrasound bryan Haney regarding urgent  RLE doppler to r/o DVT as pt has increase in foot swelliing,  per US [place], will do doppler in the morning at 8am, states US does not come in at night/ overnight.   Escalated to [place] Nursing supervisor pending response. Patient is a 73y old  Male who presents with a chief complaint of Parkinson's Disease (17 Feb 2024 11:34)      Patient seen and examined at bedside, NAD, resident Ricketts called by RN for R foot swelling, US RLE ordered and placed by resident. On-call ultrasound bryan Haney called regarding urgent RLE doppler ,  per US bryan Haney,  doppler to be completed in the morning at 8am as US for inpatient not completed overnight. Discussed with [place] Nursing supervisor. Pt has no swelling or erythema to calf, no erythema to foot noted. Dorsal aspect of right foot mildly tender to touch. Pt denies SOB, CP, N/V, F/C, lightheadedness, headache, congestion,  abdominal pain.     Case discussed with MD Ricketts and MD Alanis.       ALLERGIES:  penicillin (Unknown)  sulfa drugs (Unknown)  cefaclor (Unknown)    MEDICATIONS  (STANDING):  allopurinol 200 milliGRAM(s) Oral daily  amLODIPine   Tablet 5 milliGRAM(s) Oral daily  aspirin  chewable 81 milliGRAM(s) Oral daily  carbidopa/levodopa  25/100 2 Tablet(s) Oral <User Schedule>  carbidopa/levodopa CR 50/200 1 Tablet(s) Oral <User Schedule>  carvedilol 3.125 milliGRAM(s) Oral every 12 hours  enoxaparin Injectable 40 milliGRAM(s) SubCutaneous every 24 hours  entacapone 200 milliGRAM(s) Oral <User Schedule>  gabapentin 100 milliGRAM(s) Oral at bedtime  lactulose Syrup 20 Gram(s) Oral daily  levETIRAcetam 500 milliGRAM(s) Oral two times a day  lidocaine   4% Patch 1 Patch Transdermal daily  melatonin 6 milliGRAM(s) Oral at bedtime  polyethylene glycol 3350 17 Gram(s) Oral every 12 hours  rivastigmine patch  9.5 mG/24 Hr(s). 1 Patch Transdermal every 24 hours  senna 2 Tablet(s) Oral at bedtime  traZODone 100 milliGRAM(s) Oral at bedtime    MEDICATIONS  (PRN):  acetaminophen     Tablet .. 650 milliGRAM(s) Oral every 6 hours PRN Mild Pain (1 - 3)    Vital Signs Last 24 Hrs  T(F): 98.7 (17 Feb 2024 21:55), Max: 98.7 (17 Feb 2024 21:55)  HR: 81 (17 Feb 2024 21:55) (74 - 85)  BP: 102/61 (17 Feb 2024 21:55) (102/61 - 133/78)  RR: 16 (17 Feb 2024 21:55) (16 - 16)  SpO2: 96% (17 Feb 2024 21:55) (96% - 96%)  I&O's Summary      ROS:   + Right dorsal aspect foot swelling  + Mild pain to R dorsal aspect of foot on palpation.     PHYSICAL EXAM:  GENERAL: NAD, well-groomed, well-developed, AXO3  HEENT: NCAT. MMM  CHEST/LUNG: Clear to auscultation bilaterally, good air entry, non-labored breathing  HEART: RRR; S1/S2  VASCULAR: Normal pulses, Normal capillary refill  EXTREMITIES: + R dorsal foot swelling. No erythema, No calf tenderness, No calf swelling, No cyanosis, no pitting edema.   SKIN: Warm, Intact  PSYCH: Normal mood, Normal affect                  RADIOLOGY & ADDITIONAL TESTS:       Swelling to dorsal aspect of right foot, no foreign body, no osteomyelitis.   Pending official read.   Discussed with Radiology MD Verma Patient is a 73y old  Male who presents with a chief complaint of Parkinson's Disease (17 Feb 2024 11:34)      Patient seen and examined at bedside, NAD, resident Ricketts called by RN for R foot swelling, US RLE ordered and placed by resident. On-call ultrasound bryan Haney called regarding urgent RLE doppler ,  per US bryan Haney,  doppler to be completed in the morning at 8am as US for inpatient not completed overnight. Discussed with [place] Nursing supervisor. Pt has no swelling or erythema to calf, no erythema to foot noted. Dorsal aspect of right foot mildly tender to touch. Pt denies SOB, CP, N/V, F/C, lightheadedness, headache, congestion,  abdominal pain. Declined pain medication at this time.     Case discussed with MD Ricketts and MD Alanis.       ALLERGIES:  penicillin (Unknown)  sulfa drugs (Unknown)  cefaclor (Unknown)    MEDICATIONS  (STANDING):  allopurinol 200 milliGRAM(s) Oral daily  amLODIPine   Tablet 5 milliGRAM(s) Oral daily  aspirin  chewable 81 milliGRAM(s) Oral daily  carbidopa/levodopa  25/100 2 Tablet(s) Oral <User Schedule>  carbidopa/levodopa CR 50/200 1 Tablet(s) Oral <User Schedule>  carvedilol 3.125 milliGRAM(s) Oral every 12 hours  enoxaparin Injectable 40 milliGRAM(s) SubCutaneous every 24 hours  entacapone 200 milliGRAM(s) Oral <User Schedule>  gabapentin 100 milliGRAM(s) Oral at bedtime  lactulose Syrup 20 Gram(s) Oral daily  levETIRAcetam 500 milliGRAM(s) Oral two times a day  lidocaine   4% Patch 1 Patch Transdermal daily  melatonin 6 milliGRAM(s) Oral at bedtime  polyethylene glycol 3350 17 Gram(s) Oral every 12 hours  rivastigmine patch  9.5 mG/24 Hr(s). 1 Patch Transdermal every 24 hours  senna 2 Tablet(s) Oral at bedtime  traZODone 100 milliGRAM(s) Oral at bedtime    MEDICATIONS  (PRN):  acetaminophen     Tablet .. 650 milliGRAM(s) Oral every 6 hours PRN Mild Pain (1 - 3)    Vital Signs Last 24 Hrs  T(F): 98.7 (17 Feb 2024 21:55), Max: 98.7 (17 Feb 2024 21:55)  HR: 81 (17 Feb 2024 21:55) (74 - 85)  BP: 102/61 (17 Feb 2024 21:55) (102/61 - 133/78)  RR: 16 (17 Feb 2024 21:55) (16 - 16)  SpO2: 96% (17 Feb 2024 21:55) (96% - 96%)  I&O's Summary      ROS:   + Right dorsal aspect foot swelling  + Mild pain to R dorsal aspect of foot on palpation.     PHYSICAL EXAM:  GENERAL: NAD, well-groomed, well-developed, AXO3  HEENT: NCAT. MMM  CHEST/LUNG: Clear to auscultation bilaterally, good air entry, non-labored breathing  HEART: RRR; S1/S2  VASCULAR: Normal pulses, Normal capillary refill  EXTREMITIES: + R dorsal foot swelling. No erythema, No calf tenderness, No calf swelling, No cyanosis, no pitting edema.   SKIN: Warm, Intact  PSYCH: Normal mood, Normal affect                  RADIOLOGY & ADDITIONAL TESTS:       Swelling to dorsal aspect of right foot, no foreign body, no osteomyelitis.   Pending official read.   Discussed with Radiology MD Verma Patient is a 73y old  Male who presents with a chief complaint of Parkinson's Disease (17 Feb 2024 11:34)      Patient seen and examined at bedside, NAD, resident Ricketts called by RN for R foot swelling, US RLE ordered and placed by resident. On-call ultrasound bryan Haney called regarding urgent RLE doppler ,  per US bryan Haney,  doppler to be completed in the morning at 8am as US for inpatient not completed overnight. Discussed with [place] Nursing supervisor. Pt has no swelling or erythema to calf, no erythema to foot noted. Dorsal aspect of right foot mildly tender to touch. Pt denies SOB, CP, N/V, F/C, lightheadedness, headache, congestion,  abdominal pain. Declined pain medication at this time.     Case discussed with MD Ricketts and MD Alanis.       ALLERGIES:  penicillin (Unknown)  sulfa drugs (Unknown)  cefaclor (Unknown)    MEDICATIONS  (STANDING):  allopurinol 200 milliGRAM(s) Oral daily  amLODIPine   Tablet 5 milliGRAM(s) Oral daily  aspirin  chewable 81 milliGRAM(s) Oral daily  carbidopa/levodopa  25/100 2 Tablet(s) Oral <User Schedule>  carbidopa/levodopa CR 50/200 1 Tablet(s) Oral <User Schedule>  carvedilol 3.125 milliGRAM(s) Oral every 12 hours  enoxaparin Injectable 40 milliGRAM(s) SubCutaneous every 24 hours  entacapone 200 milliGRAM(s) Oral <User Schedule>  gabapentin 100 milliGRAM(s) Oral at bedtime  lactulose Syrup 20 Gram(s) Oral daily  levETIRAcetam 500 milliGRAM(s) Oral two times a day  lidocaine   4% Patch 1 Patch Transdermal daily  melatonin 6 milliGRAM(s) Oral at bedtime  polyethylene glycol 3350 17 Gram(s) Oral every 12 hours  rivastigmine patch  9.5 mG/24 Hr(s). 1 Patch Transdermal every 24 hours  senna 2 Tablet(s) Oral at bedtime  traZODone 100 milliGRAM(s) Oral at bedtime    MEDICATIONS  (PRN):  acetaminophen     Tablet .. 650 milliGRAM(s) Oral every 6 hours PRN Mild Pain (1 - 3)    Vital Signs Last 24 Hrs  T(F): 98.7 (17 Feb 2024 21:55), Max: 98.7 (17 Feb 2024 21:55)  HR: 81 (17 Feb 2024 21:55) (74 - 85)  BP: 102/61 (17 Feb 2024 21:55) (102/61 - 133/78)  RR: 16 (17 Feb 2024 21:55) (16 - 16)  SpO2: 96% (17 Feb 2024 21:55) (96% - 96%)  I&O's Summary      ROS:   + Right dorsal aspect foot swelling  + Mild pain to R dorsal aspect of foot on palpation.     PHYSICAL EXAM:  GENERAL: NAD, well-groomed, well-developed, AOX3  HEENT: NCAT. MMM  CHEST/LUNG: Clear to auscultation bilaterally, good air entry, non-labored breathing  HEART: RRR; S1/S2  VASCULAR: Normal pulses, Normal capillary refill  EXTREMITIES: + R dorsal foot swelling. No erythema, No calf tenderness, No calf swelling, No cyanosis, no pitting edema.   SKIN: Warm, Intact  PSYCH: Normal mood, Normal affect                  RADIOLOGY & ADDITIONAL TESTS:       Swelling to dorsal aspect of right foot, no foreign body, no osteomyelitis.   Pending official read.   Discussed with Radiology MD Verma Patient is a 73y old  Male who presents with a chief complaint of Parkinson's Disease (17 Feb 2024 11:34)      Patient seen and examined at bedside, NAD, resident MD Ricketts called by RN for R foot swelling, US RLE ordered by resident. On-call ultrasound bryan Haney called regarding urgent RLE doppler ,  per US bryan Haney,  doppler to be completed in the morning at 8am as US for inpatient not completed overnight. Discussed with [place] Nursing supervisor. Pt has no swelling or erythema to R calf, no erythema to R foot noted. Dorsal aspect of right foot mildly tender to touch. Pt denies SOB, CP, N/V, F/C, lightheadedness, headache, congestion,  abdominal pain. Declined pain medication at this time.     Case discussed with MD Ricketts and MD Alanis.       ALLERGIES:  penicillin (Unknown)  sulfa drugs (Unknown)  cefaclor (Unknown)    MEDICATIONS  (STANDING):  allopurinol 200 milliGRAM(s) Oral daily  amLODIPine   Tablet 5 milliGRAM(s) Oral daily  aspirin  chewable 81 milliGRAM(s) Oral daily  carbidopa/levodopa  25/100 2 Tablet(s) Oral <User Schedule>  carbidopa/levodopa CR 50/200 1 Tablet(s) Oral <User Schedule>  carvedilol 3.125 milliGRAM(s) Oral every 12 hours  enoxaparin Injectable 40 milliGRAM(s) SubCutaneous every 24 hours  entacapone 200 milliGRAM(s) Oral <User Schedule>  gabapentin 100 milliGRAM(s) Oral at bedtime  lactulose Syrup 20 Gram(s) Oral daily  levETIRAcetam 500 milliGRAM(s) Oral two times a day  lidocaine   4% Patch 1 Patch Transdermal daily  melatonin 6 milliGRAM(s) Oral at bedtime  polyethylene glycol 3350 17 Gram(s) Oral every 12 hours  rivastigmine patch  9.5 mG/24 Hr(s). 1 Patch Transdermal every 24 hours  senna 2 Tablet(s) Oral at bedtime  traZODone 100 milliGRAM(s) Oral at bedtime    MEDICATIONS  (PRN):  acetaminophen     Tablet .. 650 milliGRAM(s) Oral every 6 hours PRN Mild Pain (1 - 3)    Vital Signs Last 24 Hrs  T(F): 98.7 (17 Feb 2024 21:55), Max: 98.7 (17 Feb 2024 21:55)  HR: 81 (17 Feb 2024 21:55) (74 - 85)  BP: 102/61 (17 Feb 2024 21:55) (102/61 - 133/78)  RR: 16 (17 Feb 2024 21:55) (16 - 16)  SpO2: 96% (17 Feb 2024 21:55) (96% - 96%)  I&O's Summary      ROS:   + Right dorsal aspect foot swelling  + Mild pain to R dorsal aspect of foot on palpation.     PHYSICAL EXAM:  GENERAL: NAD, well-groomed, well-developed, AOX3  HEENT: NCAT. MMM  CHEST/LUNG: Clear to auscultation bilaterally, good air entry, non-labored breathing  HEART: RRR; S1/S2  VASCULAR: Normal pulses, Normal capillary refill  EXTREMITIES: + R dorsal foot swelling. No erythema, No calf tenderness, No calf swelling, No cyanosis, no pitting edema.   SKIN: Warm, Intact  PSYCH: Normal mood, Normal affect          RADIOLOGY & ADDITIONAL TESTS:       X-ray right foot:  Swelling to dorsal aspect of right foot, no foreign body, no osteomyelitis.   Pending preliminary read.  Discussed with Radiology MD Verma  -----------------------------------------------------------------------------------------------------------    XR FOOT COMP MIN 3 VIEWS  PROCEDURE DATE: 02/17/2024  ******PRELIMINARY REPORT******      INTERPRETATION: Diffuse swelling over the dorsal aspect of the foot. No evidence of soft tissue gas or foreign body.    Chronic degenerative changes and erosive changes at the first metatarsal-phalangeal joint, are suboptimally evaluated radiographically. Similar findings were present on 10/21/2018. If there is clinical concern for osteomyelitis, MRI follow-up may be obtained.    There is redemonstration of plantar calcaneal osteophyte.      Diffuse circumferential atherosclerotic calcification of medium-sized branch vessels in the ankle and foot, commonly seen in the setting of diabetes mellitus and/or chronic renal disease.        ******PRELIMINARY REPORT******        SRIDHAR VERMA MD; Attending Radiologist  This document is a PRELIMINARY interpretation and is pending final attending approval. Feb 18 2024 12:53AM Patient is a 73y old  Male who presents with a chief complaint of Parkinson's Disease (17 Feb 2024 11:34)      Patient seen and examined at bedside, NAD, resident MD Ricketts called by RN for R foot swelling, US RLE ordered by resident. On-call ultrasound bryan Haney called regarding urgent RLE doppler ,  per US bryan Haney,  doppler to be completed in the morning at 8am as US for inpatient not completed overnight. Discussed with [place] Nursing supervisor. Pt has no swelling or erythema to R calf, no erythema to R foot noted. Dorsal aspect of right foot mildly tender to touch. Pt denies SOB, CP, N/V, F/C, lightheadedness, headache, congestion,  abdominal pain. Declined pain medication at this time.     Case discussed with MD Ricketts and MD Alanis.   Will sign out to day team.     ALLERGIES:  penicillin (Unknown)  sulfa drugs (Unknown)  cefaclor (Unknown)    MEDICATIONS  (STANDING):  allopurinol 200 milliGRAM(s) Oral daily  amLODIPine   Tablet 5 milliGRAM(s) Oral daily  aspirin  chewable 81 milliGRAM(s) Oral daily  carbidopa/levodopa  25/100 2 Tablet(s) Oral <User Schedule>  carbidopa/levodopa CR 50/200 1 Tablet(s) Oral <User Schedule>  carvedilol 3.125 milliGRAM(s) Oral every 12 hours  enoxaparin Injectable 40 milliGRAM(s) SubCutaneous every 24 hours  entacapone 200 milliGRAM(s) Oral <User Schedule>  gabapentin 100 milliGRAM(s) Oral at bedtime  lactulose Syrup 20 Gram(s) Oral daily  levETIRAcetam 500 milliGRAM(s) Oral two times a day  lidocaine   4% Patch 1 Patch Transdermal daily  melatonin 6 milliGRAM(s) Oral at bedtime  polyethylene glycol 3350 17 Gram(s) Oral every 12 hours  rivastigmine patch  9.5 mG/24 Hr(s). 1 Patch Transdermal every 24 hours  senna 2 Tablet(s) Oral at bedtime  traZODone 100 milliGRAM(s) Oral at bedtime    MEDICATIONS  (PRN):  acetaminophen     Tablet .. 650 milliGRAM(s) Oral every 6 hours PRN Mild Pain (1 - 3)    Vital Signs Last 24 Hrs  T(F): 98.7 (17 Feb 2024 21:55), Max: 98.7 (17 Feb 2024 21:55)  HR: 81 (17 Feb 2024 21:55) (74 - 85)  BP: 102/61 (17 Feb 2024 21:55) (102/61 - 133/78)  RR: 16 (17 Feb 2024 21:55) (16 - 16)  SpO2: 96% (17 Feb 2024 21:55) (96% - 96%)  I&O's Summary      ROS:   + Right dorsal aspect foot swelling  + Mild pain to R dorsal aspect of foot on palpation.     PHYSICAL EXAM:  GENERAL: NAD, well-groomed, well-developed, AOX3  HEENT: NCAT. MMM  CHEST/LUNG: Clear to auscultation bilaterally, good air entry, non-labored breathing  HEART: RRR; S1/S2  VASCULAR: Normal pulses, Normal capillary refill  EXTREMITIES: + R dorsal foot swelling. No erythema, No calf tenderness, No calf swelling, No cyanosis, no pitting edema.   SKIN: Warm, Intact  PSYCH: Normal mood, Normal affect          RADIOLOGY & ADDITIONAL TESTS:       X-ray right foot:  Swelling to dorsal aspect of right foot, no foreign body, no osteomyelitis.   Pending preliminary read.  Discussed with Radiology MD Verma  -----------------------------------------------------------------------------------------------------------    XR FOOT COMP MIN 3 VIEWS  PROCEDURE DATE: 02/17/2024  ******PRELIMINARY REPORT******      INTERPRETATION: Diffuse swelling over the dorsal aspect of the foot. No evidence of soft tissue gas or foreign body.    Chronic degenerative changes and erosive changes at the first metatarsal-phalangeal joint, are suboptimally evaluated radiographically. Similar findings were present on 10/21/2018. If there is clinical concern for osteomyelitis, MRI follow-up may be obtained.    There is redemonstration of plantar calcaneal osteophyte.      Diffuse circumferential atherosclerotic calcification of medium-sized branch vessels in the ankle and foot, commonly seen in the setting of diabetes mellitus and/or chronic renal disease.        ******PRELIMINARY REPORT******        SRIDHAR VERMA MD; Attending Radiologist  This document is a PRELIMINARY interpretation and is pending final attending approval. Feb 18 2024 12:53AM Patient is a 73y old  Male who presents with a chief complaint of Parkinson's Disease (17 Feb 2024 11:34)      Patient seen and examined at bedside, NAD, resident MD Ricketts called by RN for R foot swelling, US RLE ordered by resident. On-call ultrasound bryan Haney called regarding urgent RLE doppler ,  per US bryan Haney,  doppler to be completed in the morning at 8am as US for inpatient not completed overnight. Discussed with [place] Nursing supervisor. Pt has no swelling or erythema to R calf, no erythema to R foot noted. Dorsal aspect of right foot mildly tender to touch. Pt denies SOB, CP, N/V, F/C, lightheadedness, headache, congestion,  abdominal pain. Declined pain medication at this time. RLE elevated.     Case discussed with MD Ricketts and MD Alanis.   Will sign out to day team.     ALLERGIES:  penicillin (Unknown)  sulfa drugs (Unknown)  cefaclor (Unknown)    MEDICATIONS  (STANDING):  allopurinol 200 milliGRAM(s) Oral daily  amLODIPine   Tablet 5 milliGRAM(s) Oral daily  aspirin  chewable 81 milliGRAM(s) Oral daily  carbidopa/levodopa  25/100 2 Tablet(s) Oral <User Schedule>  carbidopa/levodopa CR 50/200 1 Tablet(s) Oral <User Schedule>  carvedilol 3.125 milliGRAM(s) Oral every 12 hours  enoxaparin Injectable 40 milliGRAM(s) SubCutaneous every 24 hours  entacapone 200 milliGRAM(s) Oral <User Schedule>  gabapentin 100 milliGRAM(s) Oral at bedtime  lactulose Syrup 20 Gram(s) Oral daily  levETIRAcetam 500 milliGRAM(s) Oral two times a day  lidocaine   4% Patch 1 Patch Transdermal daily  melatonin 6 milliGRAM(s) Oral at bedtime  polyethylene glycol 3350 17 Gram(s) Oral every 12 hours  rivastigmine patch  9.5 mG/24 Hr(s). 1 Patch Transdermal every 24 hours  senna 2 Tablet(s) Oral at bedtime  traZODone 100 milliGRAM(s) Oral at bedtime    MEDICATIONS  (PRN):  acetaminophen     Tablet .. 650 milliGRAM(s) Oral every 6 hours PRN Mild Pain (1 - 3)    Vital Signs Last 24 Hrs  T(F): 98.7 (17 Feb 2024 21:55), Max: 98.7 (17 Feb 2024 21:55)  HR: 81 (17 Feb 2024 21:55) (74 - 85)  BP: 102/61 (17 Feb 2024 21:55) (102/61 - 133/78)  RR: 16 (17 Feb 2024 21:55) (16 - 16)  SpO2: 96% (17 Feb 2024 21:55) (96% - 96%)  I&O's Summary      ROS:   + Right dorsal aspect foot swelling  + Mild pain to R dorsal aspect of foot on palpation.     PHYSICAL EXAM:  GENERAL: NAD, well-groomed, well-developed, AOX3  HEENT: NCAT. MMM  CHEST/LUNG: Clear to auscultation bilaterally, good air entry, non-labored breathing  HEART: RRR; S1/S2  VASCULAR: Normal pulses, Normal capillary refill  EXTREMITIES: + R dorsal foot swelling. No erythema, No calf tenderness, No calf swelling, No cyanosis, no pitting edema.   SKIN: Warm, Intact  PSYCH: Normal mood, Normal affect          RADIOLOGY & ADDITIONAL TESTS:       X-ray right foot:  Swelling to dorsal aspect of right foot, no foreign body, no osteomyelitis.   Pending preliminary read.  Discussed with Radiology MD Verma  -----------------------------------------------------------------------------------------------------------    XR FOOT COMP MIN 3 VIEWS  PROCEDURE DATE: 02/17/2024  ******PRELIMINARY REPORT******      INTERPRETATION: Diffuse swelling over the dorsal aspect of the foot. No evidence of soft tissue gas or foreign body.    Chronic degenerative changes and erosive changes at the first metatarsal-phalangeal joint, are suboptimally evaluated radiographically. Similar findings were present on 10/21/2018. If there is clinical concern for osteomyelitis, MRI follow-up may be obtained.    There is redemonstration of plantar calcaneal osteophyte.      Diffuse circumferential atherosclerotic calcification of medium-sized branch vessels in the ankle and foot, commonly seen in the setting of diabetes mellitus and/or chronic renal disease.        ******PRELIMINARY REPORT******        SRIDHAR VERMA MD; Attending Radiologist  This document is a PRELIMINARY interpretation and is pending final attending approval. Feb 18 2024 12:53AM

## 2024-02-17 NOTE — PROGRESS NOTE ADULT - SUBJECTIVE AND OBJECTIVE BOX
SUBJECTIVE/ROS: Patient evaluated at bedside this morning. No acute events overnight. Patient in bed in NAD, no SOB, no n/v, no pain. He denies chest pain, fever, chills, nausea, vomiting, abdominal pain, headache, or BLE pain.     HPI:  74 y/o male with PMH of Parkinson's disease (diagnosed 7 years ago), HTN, 2 x CVA (1998, 2016, minimal residual), seizure (2019 - on keppra), presented to [facility] ED on 1/31 after 2 falls. (of note, patient was admitted for same complaints in 09/22/2022). Trauma w/u was negative for acute traumatic injury. (CT chest, CT abdomen/pelvis, CT c-spine, CT head, xray pelvis, xray chest). Further workup for fall negative. Neurology evaluated the patient and feels that he is having morning freeze episodes secondary to wearing off of sinemet because of lack of long-acting version at bedtime; Sinemet regimen changed with improvement (sinemet 25/100 2 tab at 7am, 12 pm and 5pm; sinemet ER 50/200 1 tab HS (10pm)).     Hospital course complicated by:  - COVID positive on admission  - completed RDV x 5 Day.    - Incidental find of 1.8 cm left apical lung mass, suspicious for malignancy-new from neck CT of 6/1/2023. Will require outpatient pulmonology follow-up for PET/CT and PFTs  - CT abdomen showed: 2 rounded foci in the transverse colon. Differential includes adherent stool versus colonic lesions. Will require outpatient colonoscopy with Dr. Nelson.    Patient was evaluated by PM&R and therapy for functional deficits and gait/ADL impairments and recommend acute rehabilitation.  Patient was medically optimized for discharge to [place] on 2/12/2023          Vital Signs Last 24 Hrs  T(C): 36.5 (17 Feb 2024 07:34), Max: 37.1 (16 Feb 2024 22:01)  T(F): 97.7 (17 Feb 2024 07:34), Max: 98.8 (16 Feb 2024 22:01)  HR: 74 (17 Feb 2024 07:34) (74 - 89)  BP: 133/78 (17 Feb 2024 07:34) (127/72 - 133/78)  BP(mean): --  RR: 16 (17 Feb 2024 07:34) (16 - 16)  SpO2: 96% (17 Feb 2024 07:34) (96% - 96%)    Parameters below as of 17 Feb 2024 07:34  Patient On (Oxygen Delivery Method): room air        PHYSICAL EXAM  Constitutional - NAD, Comfortable  HEENT - NCAT, MMM  Chest - clear anteriorly  cardiac: S1 S2  Extremities - No C/C/E   Neurologic Exam - alert, oriented to self, place, and time, mild hypophonia. Follows 2SC  Motor:  BUE: 5/5mp  BLE 5/5mp    sensory: intact LT BLE      LABS:              MEDICATIONS  (STANDING):  allopurinol 200 milliGRAM(s) Oral daily  amLODIPine   Tablet 5 milliGRAM(s) Oral daily  aspirin  chewable 81 milliGRAM(s) Oral daily  carbidopa/levodopa  25/100 2 Tablet(s) Oral <User Schedule>  carbidopa/levodopa CR 50/200 1 Tablet(s) Oral <User Schedule>  carvedilol 3.125 milliGRAM(s) Oral every 12 hours  enoxaparin Injectable 40 milliGRAM(s) SubCutaneous every 24 hours  entacapone 200 milliGRAM(s) Oral <User Schedule>  gabapentin 100 milliGRAM(s) Oral at bedtime  lactulose Syrup 20 Gram(s) Oral daily  levETIRAcetam 500 milliGRAM(s) Oral two times a day  lidocaine   4% Patch 1 Patch Transdermal daily  melatonin 6 milliGRAM(s) Oral at bedtime  polyethylene glycol 3350 17 Gram(s) Oral every 12 hours  rivastigmine patch  9.5 mG/24 Hr(s). 1 Patch Transdermal every 24 hours  senna 2 Tablet(s) Oral at bedtime  traZODone 100 milliGRAM(s) Oral at bedtime    MEDICATIONS  (PRN):  acetaminophen     Tablet .. 650 milliGRAM(s) Oral every 6 hours PRN Mild Pain (1 - 3)

## 2024-02-17 NOTE — PROGRESS NOTE ADULT - ASSESSMENT
72 y/o male with PMH of Parkinson's disease (diagnosed 7 years ago), HTN, 2 x CVA (1998, 2016), seizure (2019 - on keppra), presented to [facility] ED on 1/31 after 2 falls. Fall w/u negative - fall attributed to PD regimen. Hospital course complicated by COVID positive on admission completed RDV x 5 Day.  Imaging with Incidental finding of 1.8 cm left apical lung mass, suspicious for malignancy-new from neck CT of 6/1/2023 and 2 rounded foci in the transverse colon. Differential includes adherent stool versus colonic lesions. Will require outpatient colonoscopy with Dr. Nelson. Patient was evaluated by PM&R and therapy for functional deficits and gait/ADL impairments and recommend acute rehabilitation.  Patient was medically optimized for discharge to [facility] on 2/12/2023    #Parkinson's related motor symptoms  #Dyskinesia/Rigidity/Bradykinesia  - Sinemet 25/100 2 tabs 7am, 12pm, 5pm  - Sinemet ER 50/200 1 tab HS  - Entacapone 200 1 tab at 7am, 12pm and 5pm   - Rivastigmine 9.5mg transdermal  - Fall precaution   - Movement disorders specialist to see  - Pain control and bowel regimen per rehab team   - Comprehensive rehab program with PT/OT/SLP     #Orthostatic hypotension  - Monitor OH vital signs  - TEDS and abd binders   - Currently with coreg and norvasc as pt with minimal symptoms     #Dizziness   - may be OH related  - Meclizine 12.5 BID PRN - meclizine d/c'd due to sedation 2/13  - Monitor symptoms     #Prior CVA  - Continue ASA 81  - previously on atorvastatin 40 (now off since 1/2023) - LDL 80  - Follow up with PMD to repeat lipid panel and resume atorvastatin as indicated     #Seizure  - Continue Keppra   - Seizure precaution    #HTN  - Continue amlodipine 5mg daily (reduced dose) and coreg 3.125mg BID   -  Monitor BP   - Reevaluate need due to possible symptomatic OH    #history of Diabetes per chart  - Follow up HbA1C  - FS 81 on fasting blood work 2/13  - Monitor glucose via lab    #Gout  - Allopurinol 200    #COVID positive (1/31) - completed isolation  - completed RDV x 5 Day  - Repeat COVID PCR positive 2/12   - Supportive care PRN     #Incidental Findings  - 1.8 cm left apical lung mass, suspicious for malignancy-new from neck CT of 6/1/2023. ****Outpatient pulmonology follow-up for PET/CT and PFTs  - 2 rounded foci in the transverse colon. Differential includes adherent stool versus colonic lesions.****outpatient colonoscopy with Dr. Nelson.    #DVT Prophylaxis  - Lovenox SC

## 2024-02-17 NOTE — PROGRESS NOTE ADULT - ASSESSMENT
ASSESSMENT/PLAN  72 y/o male with PMH of Parkinson's disease (diagnosed 7 years ago), HTN, 2 x CVA (1998, 2016), seizure (2019 - on keppra), presented to [facility] ED on 1/31 after 2 falls. Fall w/u negative - attribute fall to PD regimen.    #Parkinson's Disease now with gait Instability, ADL impairments and Functional impairments  - Continue Comprehensive Rehab Program of PT/OT/SLP    ------------------------------------------------------  Parkinson's related motor symptoms    #Dyskinesia/Rigidity/Bradykinesia  - Sinemet 25/100 2 tabs 7am, 12pm, 5pm  - Entacapone 200 1 tab at 7am, 12pm and 5pm   - Sinemet ER 50/200 1 tab HS  - Rivastigmine 9.5mg transdermal   - Movement disorders following    --------------------------------------------------------  Parkinson's related non-motor symptoms    #Orthostatic hypotension  -Monitor OH vital signs  -Currently on carvedilol and norvasc - re-evaluate continued need    #Dizziness may be OH related  - Meclizine 12.5 BID PRN - discontinued meclizine due to sedation 2/13 -   -Dizziness improving     #Sleep/Mood  - Trazodone 100 at bedtime   - Melatonin 6mg HS  -Stop ambien as may be contributing to dizziness 2/13 - tolerating the change well  - Recreation Therapy  - Neuropsych consult    #Pain control  - Tylenol PRN, Lidocaine patch   - Muscle relaxant: Methocarbamol 750 TID PRN - stopped due to dizziness   -Continue Gabapentin 100mg at bedtime for LBP 2/13     #GI/Bowel Management/Constipation  - Senna at bedtime, Miralax QD  - Lactulose PRN    #Bladder management  - Continue to monitor PVR q 8 hours (SC if > 400)  - Monitor UO    ----------------------------------------------------------  Concurrent medical problems    #Prior CVA  - ASA 81  - previously on atorvastatin 40 (now off since 1/2023) - LDL 80    #Seizure  - Keppra 500 BID    #HTN  - Amlodipine 5mg daily   - Carvedilol 3.125 BID  - Reevaluate need due to possible symptomatic OH - denies OH symptoms     #history of Diabetes per chart  - A1C 5.9  - FS has been <150  - Continue monitoring glucose via lab    #Gout  - Allopurinol 200    #COVID positive (1/31) - completed isolation  - completed RDV x 5 Day    #Incidental finds:  - 1.8 cm left apical lung mass, suspicious for malignancy-new from neck CT of 6/1/2023. ****Outpatient pulmonology follow-up for PET/CT and PFTs  - 2 rounded foci in the transverse colon. Differential includes adherent stool versus colonic lesions.****outpatient colonoscopy with Dr. Nelson.    #DVT Prophylaxis  - Lovenox  - TEDs     #Skin:  -No active issues at this time    FEN   - Diet - Regular + Thins  - Dysphagia  SLP - evaluation and treatment    Labs:  CBC, CMP 2/20

## 2024-02-17 NOTE — PROGRESS NOTE ADULT - SUBJECTIVE AND OBJECTIVE BOX
Patient is a 73y old  Male who presents with a chief complaint of Parkinson's Disease (17 Feb 2024 08:04)      Subjective and overnight events:  Patient seen and examined at bedside. reports mild lightheadedness on position change. no fever, chills, sob, cp, abd pain.     ALLERGIES:  penicillin (Unknown)  sulfa drugs (Unknown)  cefaclor (Unknown)    MEDICATIONS  (STANDING):  allopurinol 200 milliGRAM(s) Oral daily  amLODIPine   Tablet 5 milliGRAM(s) Oral daily  aspirin  chewable 81 milliGRAM(s) Oral daily  carbidopa/levodopa  25/100 2 Tablet(s) Oral <User Schedule>  carbidopa/levodopa CR 50/200 1 Tablet(s) Oral <User Schedule>  carvedilol 3.125 milliGRAM(s) Oral every 12 hours  enoxaparin Injectable 40 milliGRAM(s) SubCutaneous every 24 hours  entacapone 200 milliGRAM(s) Oral <User Schedule>  gabapentin 100 milliGRAM(s) Oral at bedtime  lactulose Syrup 20 Gram(s) Oral daily  levETIRAcetam 500 milliGRAM(s) Oral two times a day  lidocaine   4% Patch 1 Patch Transdermal daily  melatonin 6 milliGRAM(s) Oral at bedtime  polyethylene glycol 3350 17 Gram(s) Oral every 12 hours  rivastigmine patch  9.5 mG/24 Hr(s). 1 Patch Transdermal every 24 hours  senna 2 Tablet(s) Oral at bedtime  traZODone 100 milliGRAM(s) Oral at bedtime    MEDICATIONS  (PRN):  acetaminophen     Tablet .. 650 milliGRAM(s) Oral every 6 hours PRN Mild Pain (1 - 3)    Vital Signs Last 24 Hrs  T(F): 97.7 (17 Feb 2024 07:34), Max: 98.8 (16 Feb 2024 22:01)  HR: 74 (17 Feb 2024 07:34) (74 - 89)  BP: 133/78 (17 Feb 2024 07:34) (127/72 - 133/78)  RR: 16 (17 Feb 2024 07:34) (16 - 16)  SpO2: 96% (17 Feb 2024 07:34) (96% - 96%)  I&O's Summary    PHYSICAL EXAM:  General: NAD, A/O x 3  ENT: MMM  Neck: Supple, No JVD  Lungs: Clear to auscultation bilaterally  Cardio: RRR, S1/S2, No murmurs  Abdomen: Soft, Nontender, Nondistended; Bowel sounds present  Extremities: No calf tenderness, No pitting edema    LABS:                        13.6   9.05  )-----------( 175      ( 15 Feb 2024 06:33 )             39.9     02-15    140  |  106  |  19  ----------------------------<  92  4.3   |  29  |  1.02    Ca    8.5      15 Feb 2024 06:33    TPro  5.9  /  Alb  2.3  /  TBili  0.6  /  DBili  x   /  AST  34  /  ALT  13  /  AlkPhos  67  02-15        02-02 Chol 144 mg/dL LDL -- HDL 46 mg/dL Trig 93 mg/dL        Urinalysis Basic - ( 15 Feb 2024 06:33 )    Color: x / Appearance: x / SG: x / pH: x  Gluc: 92 mg/dL / Ketone: x  / Bili: x / Urobili: x   Blood: x / Protein: x / Nitrite: x   Leuk Esterase: x / RBC: x / WBC x   Sq Epi: x / Non Sq Epi: x / Bacteria: x            RADIOLOGY & ADDITIONAL TESTS:    Care Discussed with Consultants/Other Providers:

## 2024-02-18 PROCEDURE — 99232 SBSQ HOSP IP/OBS MODERATE 35: CPT

## 2024-02-18 PROCEDURE — 93971 EXTREMITY STUDY: CPT | Mod: 26,RT

## 2024-02-18 RX ADMIN — Medication 200 MILLIGRAM(S): at 11:54

## 2024-02-18 RX ADMIN — ENTACAPONE 200 MILLIGRAM(S): 200 TABLET, FILM COATED ORAL at 11:53

## 2024-02-18 RX ADMIN — ENOXAPARIN SODIUM 40 MILLIGRAM(S): 100 INJECTION SUBCUTANEOUS at 18:06

## 2024-02-18 RX ADMIN — CARBIDOPA AND LEVODOPA 1 TABLET(S): 25; 100 TABLET ORAL at 22:13

## 2024-02-18 RX ADMIN — LIDOCAINE 1 PATCH: 4 CREAM TOPICAL at 07:50

## 2024-02-18 RX ADMIN — RIVASTIGMINE 1 PATCH: 4.6 PATCH, EXTENDED RELEASE TRANSDERMAL at 17:05

## 2024-02-18 RX ADMIN — ENTACAPONE 200 MILLIGRAM(S): 200 TABLET, FILM COATED ORAL at 06:19

## 2024-02-18 RX ADMIN — CARVEDILOL PHOSPHATE 3.12 MILLIGRAM(S): 80 CAPSULE, EXTENDED RELEASE ORAL at 06:19

## 2024-02-18 RX ADMIN — CARVEDILOL PHOSPHATE 3.12 MILLIGRAM(S): 80 CAPSULE, EXTENDED RELEASE ORAL at 17:03

## 2024-02-18 RX ADMIN — Medication 6 MILLIGRAM(S): at 22:14

## 2024-02-18 RX ADMIN — SENNA PLUS 2 TABLET(S): 8.6 TABLET ORAL at 22:15

## 2024-02-18 RX ADMIN — CARBIDOPA AND LEVODOPA 2 TABLET(S): 25; 100 TABLET ORAL at 17:03

## 2024-02-18 RX ADMIN — RIVASTIGMINE 1 PATCH: 4.6 PATCH, EXTENDED RELEASE TRANSDERMAL at 07:32

## 2024-02-18 RX ADMIN — Medication 81 MILLIGRAM(S): at 11:55

## 2024-02-18 RX ADMIN — Medication 100 MILLIGRAM(S): at 22:15

## 2024-02-18 RX ADMIN — CARBIDOPA AND LEVODOPA 2 TABLET(S): 25; 100 TABLET ORAL at 11:54

## 2024-02-18 RX ADMIN — AMLODIPINE BESYLATE 5 MILLIGRAM(S): 2.5 TABLET ORAL at 06:19

## 2024-02-18 RX ADMIN — LEVETIRACETAM 500 MILLIGRAM(S): 250 TABLET, FILM COATED ORAL at 17:03

## 2024-02-18 RX ADMIN — RIVASTIGMINE 1 PATCH: 4.6 PATCH, EXTENDED RELEASE TRANSDERMAL at 18:06

## 2024-02-18 RX ADMIN — ENTACAPONE 200 MILLIGRAM(S): 200 TABLET, FILM COATED ORAL at 17:03

## 2024-02-18 RX ADMIN — LEVETIRACETAM 500 MILLIGRAM(S): 250 TABLET, FILM COATED ORAL at 06:19

## 2024-02-18 RX ADMIN — CARBIDOPA AND LEVODOPA 2 TABLET(S): 25; 100 TABLET ORAL at 06:24

## 2024-02-18 RX ADMIN — GABAPENTIN 100 MILLIGRAM(S): 400 CAPSULE ORAL at 22:15

## 2024-02-18 RX ADMIN — LACTULOSE 20 GRAM(S): 10 SOLUTION ORAL at 11:54

## 2024-02-18 NOTE — PROGRESS NOTE ADULT - ASSESSMENT
74 y/o male with PMH of Parkinson's disease (diagnosed 7 years ago), HTN, 2 x CVA (1998, 2016), seizure (2019 - on keppra), presented to [facility] ED on 1/31 after 2 falls. Fall w/u negative - fall attributed to PD regimen. Hospital course complicated by COVID positive on admission completed RDV x 5 Day.  Imaging with Incidental finding of 1.8 cm left apical lung mass, suspicious for malignancy-new from neck CT of 6/1/2023 and 2 rounded foci in the transverse colon. Differential includes adherent stool versus colonic lesions. Will require outpatient colonoscopy with Dr. Nelson. Patient was evaluated by PM&R and therapy for functional deficits and gait/ADL impairments and recommend acute rehabilitation.  Patient was medically optimized for discharge to [facility] on 2/12/2023    #Parkinson's related motor symptoms  #Dyskinesia/Rigidity/Bradykinesia  - Sinemet 25/100 2 tabs 7am, 12pm, 5pm  - Sinemet ER 50/200 1 tab HS  - Entacapone 200 1 tab at 7am, 12pm and 5pm   - Rivastigmine 9.5mg transdermal  - Fall precaution   - Movement disorders specialist to see  - Pain control and bowel regimen per rehab team     #Orthostatic hypotension  - Monitor OH vital signs  - TEDS and abd binders   - Currently with coreg and norvasc as pt with minimal symptoms     #Dizziness   - may be OH related  - Meclizine 12.5 BID PRN - meclizine d/c'd due to sedation 2/13  - Monitor symptoms     #Prior CVA  - Continue ASA 81  - previously on atorvastatin 40 (now off since 1/2023) - LDL 80  - Follow up with PMD to repeat lipid panel and resume atorvastatin as indicated     #Seizure  - Continue Keppra   - Seizure precaution    #HTN  - Continue amlodipine 5mg daily (reduced dose) and coreg 3.125mg BID   -  Monitor BP   - Reevaluate need due to possible symptomatic OH    #history of Diabetes per chart  - Follow up HbA1C  - FS 81 on fasting blood work 2/13  - Monitor glucose via lab    #Gout  - Allopurinol 200    #COVID positive (1/31) - completed isolation  - completed RDV x 5 Day  - Repeat COVID PCR positive 2/12   - Supportive care PRN     #Incidental Findings  - 1.8 cm left apical lung mass, suspicious for malignancy-new from neck CT of 6/1/2023. ****Outpatient pulmonology follow-up for PET/CT and PFTs  - 2 rounded foci in the transverse colon. Differential includes adherent stool versus colonic lesions.****outpatient colonoscopy with Dr. Nelson.    #DVT Prophylaxis  - Lovenox SC

## 2024-02-18 NOTE — PROGRESS NOTE ADULT - SUBJECTIVE AND OBJECTIVE BOX
Patient is a 73y old  Male who presents with a chief complaint of Parkinson's Disease (18 Feb 2024 07:33)    Patient seen and examined at bedside. reports mild lightheadedness on position change. no fever, chills, sob, cp, abd pain.     Patient seen and examined at bedside.    ALLERGIES:  penicillin (Unknown)  sulfa drugs (Unknown)  cefaclor (Unknown)    MEDICATIONS  (STANDING):  allopurinol 200 milliGRAM(s) Oral daily  amLODIPine   Tablet 5 milliGRAM(s) Oral daily  aspirin  chewable 81 milliGRAM(s) Oral daily  carbidopa/levodopa  25/100 2 Tablet(s) Oral <User Schedule>  carbidopa/levodopa CR 50/200 1 Tablet(s) Oral <User Schedule>  carvedilol 3.125 milliGRAM(s) Oral every 12 hours  enoxaparin Injectable 40 milliGRAM(s) SubCutaneous every 24 hours  entacapone 200 milliGRAM(s) Oral <User Schedule>  gabapentin 100 milliGRAM(s) Oral at bedtime  lactulose Syrup 20 Gram(s) Oral daily  levETIRAcetam 500 milliGRAM(s) Oral two times a day  lidocaine   4% Patch 1 Patch Transdermal daily  melatonin 6 milliGRAM(s) Oral at bedtime  polyethylene glycol 3350 17 Gram(s) Oral every 12 hours  rivastigmine patch  9.5 mG/24 Hr(s). 1 Patch Transdermal every 24 hours  senna 2 Tablet(s) Oral at bedtime  traZODone 100 milliGRAM(s) Oral at bedtime    MEDICATIONS  (PRN):  acetaminophen     Tablet .. 650 milliGRAM(s) Oral every 6 hours PRN Mild Pain (1 - 3)    Vital Signs Last 24 Hrs  T(F): 97.9 (18 Feb 2024 07:45), Max: 98.7 (17 Feb 2024 21:55)  HR: 76 (18 Feb 2024 07:45) (76 - 85)  BP: 130/83 (18 Feb 2024 07:45) (102/61 - 130/83)  RR: 16 (18 Feb 2024 07:45) (16 - 16)  SpO2: 95% (18 Feb 2024 07:45) (95% - 96%)  I&O's Summary    PHYSICAL EXAM:  General: NAD, A/O x 3  ENT: MMM  Neck: Supple, No JVD  Lungs: Clear to auscultation bilaterally  Cardio: RRR, S1/S2, No murmurs  Abdomen: Soft, Nontender, Nondistended; Bowel sounds present  Extremities: No calf tenderness, No pitting edema      LABS:      02-02 Chol 144 mg/dL LDL -- HDL 46 mg/dL Trig 93 mg/dL    COVID-19 PCR: NotDetec (01-20-23 @ 17:34)  COVID-19 PCR: NotDetec (09-27-22 @ 11:44)  COVID-19 PCR: NotDetec (09-22-22 @ 13:12)

## 2024-02-18 NOTE — PROGRESS NOTE ADULT - ASSESSMENT
ASSESSMENT/PLAN  72 y/o male with PMH of Parkinson's disease (diagnosed 7 years ago), HTN, 2 x CVA (1998, 2016), seizure (2019 - on keppra), presented to [facility] ED on 1/31 after 2 falls. Fall w/u negative - attribute fall to PD regimen.    #Parkinson's Disease now with gait Instability, ADL impairments and Functional impairments  - Continue Comprehensive Rehab Program of PT/OT/SLP    ------------------------------------------------------  Parkinson's related motor symptoms    #Dyskinesia/Rigidity/Bradykinesia  - Sinemet 25/100 2 tabs 7am, 12pm, 5pm  - Entacapone 200 1 tab at 7am, 12pm and 5pm   - Sinemet ER 50/200 1 tab HS  - Rivastigmine 9.5mg transdermal   - Movement disorders following    --------------------------------------------------------  Parkinson's related non-motor symptoms    #Orthostatic hypotension  -Monitor OH vital signs  -Currently on carvedilol and norvasc - re-evaluate continued need    #Dizziness may be OH related  - Meclizine 12.5 BID PRN - discontinued meclizine due to sedation 2/13 -   -Dizziness improved     #Sleep/Mood  - Trazodone 100 at bedtime   - Melatonin 6mg HS  -Stop ambien as may be contributing to dizziness 2/13 - tolerating the change well  - Recreation Therapy  - Neuropsych consult    #Pain control  - Tylenol PRN, Lidocaine patch   - Muscle relaxant: Methocarbamol 750 TID PRN - stopped due to dizziness   -Continue Gabapentin 100mg at bedtime for LBP 2/13     #GI/Bowel Management/Constipation  - Senna at bedtime, Miralax QD  - Lactulose PRN    #Bladder management  - Continue to monitor PVR q 8 hours (SC if > 400)  - Monitor UO    ----------------------------------------------------------  Concurrent medical problems    #Prior CVA  - ASA 81  - previously on atorvastatin 40 (now off since 1/2023) - LDL 80    #Seizure  - Keppra 500 BID    #HTN  - Amlodipine 5mg daily   - Carvedilol 3.125 BID  - Reevaluate need due to possible symptomatic OH - denies OH symptoms     #history of Diabetes per chart  - A1C 5.9  - FS has been <150  - Continue monitoring glucose via lab    #Gout/right lateral foot pain, clinical exam benign this morning  - X-ray right foot- chronic degenerative changes and erosive changes at the first metatarsal-phalangeal joint, are suboptimally evaluated radiographically. Similar findings were present on 10/21/2018.  - Doppler results (P)  - Allopurinol 200    #COVID positive (1/31) - completed isolation  - completed RDV x 5 Day    #Incidental finds:  - 1.8 cm left apical lung mass, suspicious for malignancy-new from neck CT of 6/1/2023. ****Outpatient pulmonology follow-up for PET/CT and PFTs  - 2 rounded foci in the transverse colon. Differential includes adherent stool versus colonic lesions.****outpatient colonoscopy with Dr. Nelson.    #DVT Prophylaxis  - Lovenox  - TEDs       FEN   - Diet - Regular + Thins  - Dysphagia  SLP - evaluation and treatment    Labs:  CBC, CMP 2/20

## 2024-02-18 NOTE — PROGRESS NOTE ADULT - SUBJECTIVE AND OBJECTIVE BOX
SUBJECTIVE/ROS: Patient evaluated at bedside this morning. No acute events overnight. Patient in bed in NAD, no SOB, no n/v, no pain. He denies chest pain, fever, chills, abdominal pain, or BLE pain.     HPI:  74 y/o male with PMH of Parkinson's disease (diagnosed 7 years ago), HTN, 2 x CVA (1998, 2016, minimal residual), seizure (2019 - on keppra), presented to [facility] ED on 1/31 after 2 falls. (of note, patient was admitted for same complaints in 09/22/2022). Trauma w/u was negative for acute traumatic injury. (CT chest, CT abdomen/pelvis, CT c-spine, CT head, xray pelvis, xray chest). Further workup for fall negative. Neurology evaluated the patient and feels that he is having morning freeze episodes secondary to wearing off of sinemet because of lack of long-acting version at bedtime; Sinemet regimen changed with improvement (sinemet 25/100 2 tab at 7am, 12 pm and 5pm; sinemet ER 50/200 1 tab HS (10pm)).     Hospital course complicated by:  - COVID positive on admission  - completed RDV x 5 Day.    - Incidental find of 1.8 cm left apical lung mass, suspicious for malignancy-new from neck CT of 6/1/2023. Will require outpatient pulmonology follow-up for PET/CT and PFTs  - CT abdomen showed: 2 rounded foci in the transverse colon. Differential includes adherent stool versus colonic lesions. Will require outpatient colonoscopy with Dr. Nelson.    Patient was evaluated by PM&R and therapy for functional deficits and gait/ADL impairments and recommend acute rehabilitation.  Patient was medically optimized for discharge to [facility] on 2/12/2023          Vital Signs Last 24 Hrs  T(C): 37.1 (17 Feb 2024 21:55), Max: 37.1 (17 Feb 2024 21:55)  T(F): 98.7 (17 Feb 2024 21:55), Max: 98.7 (17 Feb 2024 21:55)  HR: 81 (17 Feb 2024 21:55) (81 - 85)  BP: 102/61 (17 Feb 2024 21:55) (102/61 - 128/82)  BP(mean): --  RR: 16 (17 Feb 2024 21:55) (16 - 16)  SpO2: 96% (17 Feb 2024 21:55) (96% - 96%)    Parameters below as of 17 Feb 2024 21:55  Patient On (Oxygen Delivery Method): room air          PHYSICAL EXAM  Constitutional - NAD, Comfortable  HEENT - NCAT, MMM  Chest - clear anteriorly  cardiac: S1 S2  Extremities - No C/C/E. No edema at right foot, no erythema, no TTP  Neurologic Exam - alert, oriented to self, place, and time, mild hypophonia. Follows 2SC  Motor:  BUE: 5/5mp  BLE 5/5mp    sensory: intact LT BLE      LABS:  LABS:          MEDICATIONS  (STANDING):  allopurinol 200 milliGRAM(s) Oral daily  amLODIPine   Tablet 5 milliGRAM(s) Oral daily  aspirin  chewable 81 milliGRAM(s) Oral daily  carbidopa/levodopa  25/100 2 Tablet(s) Oral <User Schedule>  carbidopa/levodopa CR 50/200 1 Tablet(s) Oral <User Schedule>  carvedilol 3.125 milliGRAM(s) Oral every 12 hours  enoxaparin Injectable 40 milliGRAM(s) SubCutaneous every 24 hours  entacapone 200 milliGRAM(s) Oral <User Schedule>  gabapentin 100 milliGRAM(s) Oral at bedtime  lactulose Syrup 20 Gram(s) Oral daily  levETIRAcetam 500 milliGRAM(s) Oral two times a day  lidocaine   4% Patch 1 Patch Transdermal daily  melatonin 6 milliGRAM(s) Oral at bedtime  polyethylene glycol 3350 17 Gram(s) Oral every 12 hours  rivastigmine patch  9.5 mG/24 Hr(s). 1 Patch Transdermal every 24 hours  senna 2 Tablet(s) Oral at bedtime  traZODone 100 milliGRAM(s) Oral at bedtime    MEDICATIONS  (PRN):  acetaminophen     Tablet .. 650 milliGRAM(s) Oral every 6 hours PRN Mild Pain (1 - 3)

## 2024-02-19 PROCEDURE — 99232 SBSQ HOSP IP/OBS MODERATE 35: CPT

## 2024-02-19 PROCEDURE — 96116 NUBHVL XM PHYS/QHP 1ST HR: CPT

## 2024-02-19 RX ADMIN — LEVETIRACETAM 500 MILLIGRAM(S): 250 TABLET, FILM COATED ORAL at 06:56

## 2024-02-19 RX ADMIN — CARBIDOPA AND LEVODOPA 2 TABLET(S): 25; 100 TABLET ORAL at 17:07

## 2024-02-19 RX ADMIN — LEVETIRACETAM 500 MILLIGRAM(S): 250 TABLET, FILM COATED ORAL at 17:07

## 2024-02-19 RX ADMIN — Medication 200 MILLIGRAM(S): at 11:52

## 2024-02-19 RX ADMIN — CARVEDILOL PHOSPHATE 3.12 MILLIGRAM(S): 80 CAPSULE, EXTENDED RELEASE ORAL at 17:07

## 2024-02-19 RX ADMIN — LACTULOSE 20 GRAM(S): 10 SOLUTION ORAL at 11:52

## 2024-02-19 RX ADMIN — RIVASTIGMINE 1 PATCH: 4.6 PATCH, EXTENDED RELEASE TRANSDERMAL at 07:17

## 2024-02-19 RX ADMIN — RIVASTIGMINE 1 PATCH: 4.6 PATCH, EXTENDED RELEASE TRANSDERMAL at 18:21

## 2024-02-19 RX ADMIN — ENTACAPONE 200 MILLIGRAM(S): 200 TABLET, FILM COATED ORAL at 17:07

## 2024-02-19 RX ADMIN — GABAPENTIN 100 MILLIGRAM(S): 400 CAPSULE ORAL at 22:03

## 2024-02-19 RX ADMIN — ENOXAPARIN SODIUM 40 MILLIGRAM(S): 100 INJECTION SUBCUTANEOUS at 18:17

## 2024-02-19 RX ADMIN — Medication 6 MILLIGRAM(S): at 22:03

## 2024-02-19 RX ADMIN — Medication 81 MILLIGRAM(S): at 11:52

## 2024-02-19 RX ADMIN — ENTACAPONE 200 MILLIGRAM(S): 200 TABLET, FILM COATED ORAL at 06:56

## 2024-02-19 RX ADMIN — CARBIDOPA AND LEVODOPA 2 TABLET(S): 25; 100 TABLET ORAL at 11:52

## 2024-02-19 RX ADMIN — CARVEDILOL PHOSPHATE 3.12 MILLIGRAM(S): 80 CAPSULE, EXTENDED RELEASE ORAL at 06:57

## 2024-02-19 RX ADMIN — CARBIDOPA AND LEVODOPA 2 TABLET(S): 25; 100 TABLET ORAL at 06:55

## 2024-02-19 RX ADMIN — ENTACAPONE 200 MILLIGRAM(S): 200 TABLET, FILM COATED ORAL at 11:52

## 2024-02-19 RX ADMIN — Medication 100 MILLIGRAM(S): at 22:02

## 2024-02-19 RX ADMIN — RIVASTIGMINE 1 PATCH: 4.6 PATCH, EXTENDED RELEASE TRANSDERMAL at 23:02

## 2024-02-19 RX ADMIN — CARBIDOPA AND LEVODOPA 1 TABLET(S): 25; 100 TABLET ORAL at 22:03

## 2024-02-19 RX ADMIN — RIVASTIGMINE 1 PATCH: 4.6 PATCH, EXTENDED RELEASE TRANSDERMAL at 18:17

## 2024-02-19 RX ADMIN — AMLODIPINE BESYLATE 5 MILLIGRAM(S): 2.5 TABLET ORAL at 06:56

## 2024-02-19 NOTE — CONSULT NOTE ADULT - SUBJECTIVE AND OBJECTIVE BOX
Pt is a 72 y/o, right-handed male with PMH of Parkinson's disease (diagnosed 7 years ago), HTN, 2 x CVA (1998, 2016, minimal residual), seizure (2019 - on keppra), presented to [facility] ED on 1/31 after 2 falls. (of note, patient was admitted for same complaints in 09/22/2022). Trauma w/u was negative for acute traumatic injury. (CT chest, CT abdomen/pelvis, CT c-spine, CT head, xray pelvis, xray chest). Further workup for fall negative. Neurology evaluated the patient and feels that he is having morning freeze episodes secondary to wearing off of sinemet because of lack of long-acting version at bedtime; Sinemet regimen changed with improvement (sinemet 25/100 2 tab at 7am, 12 pm and 5pm; sinemet ER 50/200 1 tab HS (10pm)). Hospital course complicated by: COVID positive on admission  - completed RDV x 5 Day; incidental find of 1.8 cm left apical lung mass, suspicious for malignancy-new from neck CT of 6/1/2023. Will require outpatient pulmonology follow-up for PET/CT and PFTs; and CT abdomen showed: 2 rounded foci in the transverse colon, differential includes adherent stool versus colonic lesions. Will require outpatient colonoscopy with Dr. Nelson. Pt was evaluated by PM&R and therapy for functional deficits and gait/ADL impairments and recommend acute rehabilitation.  Pt was medically optimized for discharge to [place] on 2/12/2023. PMHx: . Current meds: Please see list in Pt’s chart. Social Hx: .    Findings: Pt was seen for an initial assessment of his/her cognitive and emotional functioning. The Modified MMSE (3MS) was administered; Pt’s results (/100) were in the range. His/Her scores in geriatric mood measures suggested levels of anxiety and depression (GAD7 = /21; PHQ9 = /27; GAS = /30; GDS = /15). Pt was alert,  Ox3, and his/her attitude was cooperative.  Attn/Conc: Simple auditory attention - .  Concentration/Working memory for reversed counting and spelling – (/7). Language: Pt’s speech was of  . Naming - . Sentence repetition - . Auditory Comprehension - . Reading - . Writing - . Memory: Encoding of 3 words was (/3); short-delayed verbal recall – (/3, improving to /3 with cueing); long-delayed verbal recall – (/3, improving to /3 with cueing). LTM was for US presidents (/4, improving to /4 with cueing). Visual memory –. Visuospatial: Visuomotor integration – for copy of a 2D figure, was noted. Figure-ground discrimination was (/4) for the Poppelreuter figure. Executive Functions: Motor Planning - . Organizational skills - . Abstract reasoning - . Initiation/Phonemic Fluency - . Verbal problem solving – .   Emotional functioning: Affect - 	. Mood - ; Pt reported experiencing . On mood measures s/he additionally reported .  Thought processes were.  No abnormal thought contents were observed.  Pt denied any AH/VH. Pt also denied SI/HI/I/P. Insight - WFL. Judgment - fair.    Assessment:    FIM scores: Social Interaction ; Problem Solving ; Memory .  Plan: Individual supportive psychotherapy to monitor cognition, affect/mood, and behavior. Cognitive remediation during speech therapy sessions. Participation in recreation/art therapy in order to have pleasure and mastery experiences and regain/reestablish a sense of routine.  Time spent with Pt,  minutes.   Pt is a 72 y/o, right-handed male with PMH of Parkinson's disease (diagnosed 7 years ago), HTN, 2 x CVA (1998, 2016, minimal residual), seizure (2019 - on keppra), presented to [facility] ED on 1/31 after 2 falls. (of note, patient was admitted for same complaints in 09/22/2022). Trauma w/u was negative for acute traumatic injury. (CT chest, CT abdomen/pelvis, CT c-spine, CT head, xray pelvis, xray chest). Further workup for fall negative. Neurology evaluated the patient and feels that he is having morning freeze episodes secondary to wearing off of sinemet because of lack of long-acting version at bedtime; Sinemet regimen changed with improvement (sinemet 25/100 2 tab at 7am, 12 pm and 5pm; sinemet ER 50/200 1 tab HS (10pm)). Hospital course complicated by: COVID positive on admission  - completed RDV x 5 Day; incidental find of 1.8 cm left apical lung mass, suspicious for malignancy-new from neck CT of 6/1/2023. Will require outpatient pulmonology follow-up for PET/CT and PFTs; and CT abdomen showed: 2 rounded foci in the transverse colon, differential includes adherent stool versus colonic lesions. Will require outpatient colonoscopy with Dr. Nelson. Pt was evaluated by PM&R and therapy for functional deficits and gait/ADL impairments and recommend acute rehabilitation.  Pt was medically optimized for discharge to [place] on 2/12/2023. PMHx: As reported above. Current meds: Exelon 9.5 mG/24 hrs patch. Please see list in Pt’s chart. Social Hx: Pt is  with no children. Pt graduated from high school and is a professional [employer] . Pt has remote hx of depression treated with ECT, and lacks hx of substance abuse. Pt is Zoroastrianism. Pt enjoys photography, astronomy and listening to radio.      Findings: Pt was seen for an initial assessment of his cognitive and emotional functioning. The Modified MMSE (3MS) was administered; Pt’s results (88/100) were in the Mildly Impaired range. His scores in geriatric mood measures suggested minimal to mild levels of anxiety and depression (GAS = 3/30; GDS = 3/15). Pt was alert, closely Ox3 (he was disoriented to the exact date by 2 days), and his attitude was cooperative, amicable. Attn/Conc: Simple auditory attention - intact.  Concentration/Working memory for reversed counting and spelling – intact (7/7). Language: Pt’s speech was of normal volume, pitch and pace. Naming - intact. Sentence repetition - intact. Auditory Comprehension - intact. Reading - intact. Writing - mildly impaired (4/5 words correctly written). Memory: Encoding of 3 words was intact (3/3); short-delayed verbal recall – intact (3/3); long-delayed verbal recall – moderately impaired (1/3, improving to 3/3 with saturated cueing). LTM was intact for US presidents (4/4). Visual memory – impaired. Visuospatial: Visuomotor integration – closely intact for copy of a 2D figure (9/10), mild distortion was noted. Figure-ground discrimination was impaired (7/4) for the Poppelreuter figure. Executive Functions: Motor Planning - intact. Organizational skills - moderately impaired. Abstract reasoning - mildly impaired (4/6, for similarities). Initiation/Phonemic Fluency - moderately impaired (7/10). Verbal problem solving – closely WFL. Emotional functioning: Affect - mildly depressed, responsive to humor. Mood - "tired"; Pt reported experiencing worry. On mood measures he additionally reported feelings of isolation, memory loss, low energy, irritability, and fatigue. Thought processes were goal-directed. No abnormal thought contents were observed. Pt denied any AH/VH. Pt also denied SI/HI/I/P. Insight - WFL. Judgment - closely WFL.

## 2024-02-19 NOTE — PROGRESS NOTE ADULT - SUBJECTIVE AND OBJECTIVE BOX
SUBJECTIVE/ROS: Patient evaluated at bedside this morning. No acute events overnight. Patient in bed in NAD, no SOB, no n/v, no pain. He denies chest pain, fever, chills, abdominal pain, or BLE pain.     HPI:  72 y/o male with PMH of Parkinson's disease (diagnosed 7 years ago), HTN, 2 x CVA (1998, 2016, minimal residual), seizure (2019 - on keppra), presented to [facility] ED on 1/31 after 2 falls. (of note, patient was admitted for same complaints in 09/22/2022). Trauma w/u was negative for acute traumatic injury. (CT chest, CT abdomen/pelvis, CT c-spine, CT head, xray pelvis, xray chest). Further workup for fall negative. Neurology evaluated the patient and feels that he is having morning freeze episodes secondary to wearing off of sinemet because of lack of long-acting version at bedtime; Sinemet regimen changed with improvement (sinemet 25/100 2 tab at 7am, 12 pm and 5pm; sinemet ER 50/200 1 tab HS (10pm)).     Hospital course complicated by:  - COVID positive on admission  - completed RDV x 5 Day.    - Incidental find of 1.8 cm left apical lung mass, suspicious for malignancy-new from neck CT of 6/1/2023. Will require outpatient pulmonology follow-up for PET/CT and PFTs  - CT abdomen showed: 2 rounded foci in the transverse colon. Differential includes adherent stool versus colonic lesions. Will require outpatient colonoscopy with Dr. Nelson.    Patient was evaluated by PM&R and therapy for functional deficits and gait/ADL impairments and recommend acute rehabilitation.  Patient was medically optimized for discharge to [place] on 2/12/2023          Vital Signs Last 24 Hrs  T(C): 37.1 (17 Feb 2024 21:55), Max: 37.1 (17 Feb 2024 21:55)  T(F): 98.7 (17 Feb 2024 21:55), Max: 98.7 (17 Feb 2024 21:55)  HR: 81 (17 Feb 2024 21:55) (81 - 85)  BP: 102/61 (17 Feb 2024 21:55) (102/61 - 128/82)  BP(mean): --  RR: 16 (17 Feb 2024 21:55) (16 - 16)  SpO2: 96% (17 Feb 2024 21:55) (96% - 96%)    Parameters below as of 17 Feb 2024 21:55  Patient On (Oxygen Delivery Method): room air      ROS/Subjective: Patient seen and examined at bedside this morning. Patient in bed in NAD, no SOB, no n/v, denies pain.      PHYSICAL EXAM  Constitutional - NAD, Comfortable  HEENT - NCAT, MMM  Chest - clear anteriorly  cardiac: S1 S2  Extremities - No C/C/E. No edema at right foot, no erythema, no TTP  Neurologic Exam - alert, oriented to self, place, and time, mild hypophonia. Follows 2SC  Motor:  BUE: 5/5mp  BLE 5/5mp    sensory: intact LT BLE      LABS:  LABS:  LABS:          MEDICATIONS  (STANDING):  allopurinol 200 milliGRAM(s) Oral daily  amLODIPine   Tablet 5 milliGRAM(s) Oral daily  aspirin  chewable 81 milliGRAM(s) Oral daily  carbidopa/levodopa  25/100 2 Tablet(s) Oral <User Schedule>  carbidopa/levodopa CR 50/200 1 Tablet(s) Oral <User Schedule>  carvedilol 3.125 milliGRAM(s) Oral every 12 hours  enoxaparin Injectable 40 milliGRAM(s) SubCutaneous every 24 hours  entacapone 200 milliGRAM(s) Oral <User Schedule>  gabapentin 100 milliGRAM(s) Oral at bedtime  lactulose Syrup 20 Gram(s) Oral daily  levETIRAcetam 500 milliGRAM(s) Oral two times a day  lidocaine   4% Patch 1 Patch Transdermal daily  melatonin 6 milliGRAM(s) Oral at bedtime  polyethylene glycol 3350 17 Gram(s) Oral every 12 hours  rivastigmine patch  9.5 mG/24 Hr(s). 1 Patch Transdermal every 24 hours  senna 2 Tablet(s) Oral at bedtime  traZODone 100 milliGRAM(s) Oral at bedtime    MEDICATIONS  (PRN):  acetaminophen     Tablet .. 650 milliGRAM(s) Oral every 6 hours PRN Mild Pain (1 - 3)

## 2024-02-19 NOTE — PROGRESS NOTE ADULT - ASSESSMENT
ASSESSMENT/PLAN  72 y/o male with PMH of Parkinson's disease (diagnosed 7 years ago), HTN, 2 x CVA (1998, 2016), seizure (2019 - on keppra), presented to [facility] ED on 1/31 after 2 falls. Fall w/u negative - attribute fall to PD regimen.    #Parkinson's Disease now with gait Instability, ADL impairments and Functional impairments  - Continue Comprehensive Rehab Program of PT/OT/SLP    ------------------------------------------------------  Parkinson's related motor symptoms    #Dyskinesia/Rigidity/Bradykinesia  - Sinemet 25/100 2 tabs 7am, 12pm, 5pm  - Entacapone 200 1 tab at 7am, 12pm and 5pm   - Sinemet ER 50/200 1 tab HS  - Rivastigmine 9.5mg transdermal   - Movement disorders following    --------------------------------------------------------  Parkinson's related non-motor symptoms    #Orthostatic hypotension  -Monitor OH vital signs  -Currently on carvedilol and norvasc - re-evaluate continued need    #Dizziness may be OH related  - Meclizine 12.5 BID PRN - discontinued meclizine due to sedation 2/13 -   - asymptomatic    #Sleep/Mood  - Trazodone 100 at bedtime   - Melatonin 6mg HS  -Stop ambien as may be contributing to dizziness 2/13 - tolerating the change well  - Recreation Therapy  - Neuropsych consult    #Pain control  - Tylenol PRN, Lidocaine patch   - Muscle relaxant: Methocarbamol 750 TID PRN - stopped due to dizziness   -Continue Gabapentin 100mg at bedtime for LBP 2/13     #GI/Bowel Management/Constipation  - Senna at bedtime, Miralax QD  - Lactulose PRN    #Bladder management  - Continue to monitor PVR q 8 hours (SC if > 400)  - Monitor UO    ----------------------------------------------------------  Concurrent medical problems    #Prior CVA  - ASA 81  - previously on atorvastatin 40 (now off since 1/2023) - LDL 80    #Seizure  - Keppra 500 BID    #HTN  - Amlodipine 5mg daily   - Carvedilol 3.125 BID  - Reevaluate need due to possible symptomatic OH - denies OH symptoms     #history of Diabetes per chart  - A1C 5.9  - FS has been <150  - Continue monitoring glucose via lab    #Gout/right lateral foot pain, clinical exam benign this morning  - X-ray right foot- chronic degenerative changes and erosive changes at the first metatarsal-phalangeal joint, are suboptimally evaluated radiographically. Similar findings were present on 10/21/2018.  - Doppler negative  - Allopurinol 200    #COVID positive (1/31) - completed isolation  - completed RDV x 5 Day    #Incidental finds:  - 1.8 cm left apical lung mass, suspicious for malignancy-new from neck CT of 6/1/2023. ****Outpatient pulmonology follow-up for PET/CT and PFTs  - 2 rounded foci in the transverse colon. Differential includes adherent stool versus colonic lesions.****outpatient colonoscopy with Dr. Nelson.    #DVT Prophylaxis  - Lovenox  - TEDs       FEN   - Diet - Regular + Thins  - Dysphagia  SLP - evaluation and treatment    Labs:  CBC, CMP 2/20

## 2024-02-19 NOTE — CONSULT NOTE ADULT - ASSESSMENT
The patient's history and physical examination are consistent with parkinsonism. At present, he reports he is doing well in therapy. Constipation is a prominent issue. He is sleeping well.    Recommendations:   - Continue Miralax, lactulose  - Patient is on Gabapentin 100mg a tbedtime  - Continue Sinemet 25-100mg IR 2 tabs at 7a-12p-5p with Entacpone with each dose  - Continue Sinemet ER 50-200mg 1 tab at 10p  - Patient is on Keppra 500mg BID for seizure history  - Bowel regimen titrated to 1 bowel movement daily  - Monitor for OH  - monitor for hallucinations  - Continue PT/OT/SLP as part of inpatient PD rehab program    Case discussed with patient and primary team.
72 y/o male with PMH of Parkinson's disease (diagnosed 7 years ago), HTN, 2 x CVA (1998, 2016), seizure (2019 - on keppra), presented to [facility] ED on 1/31 after 2 falls. Fall w/u negative - fall attributed to PD regimen. Hospital course complicated by COVID positive on admission completed RDV x 5 Day.  Imaging with Incidental finding of 1.8 cm left apical lung mass, suspicious for malignancy-new from neck CT of 6/1/2023 and 2 rounded foci in the transverse colon. Differential includes adherent stool versus colonic lesions. Will require outpatient colonoscopy with Dr. Nelson.  Patient was evaluated by PM&R and therapy for functional deficits and gait/ADL impairments and recommend acute rehabilitation.  Patient was medically optimized for discharge to [facility] on 2/12/2023    #Parkinson's related motor symptoms  #Dyskinesia/Rigidity/Bradykinesia  - Sinemet 25/100 2 tabs 7am, 12pm, 5pm  - Sinemet ER 50/200 1 tab HS  - Entacapone 200 1 tab at 7am, 12pm and 5pm   - Rivastigmine 9.5mg transdermal  - Fall precaution   - Movement disorders specialist to see  - Pain control and bowel regimen per rehab team   - Comprehensive rehab program with PT/OT/SLP     #Orthostatic hypotension  -Monitor OH vital signs  -TEDS and abd binders   -Currently on coreg and norvasc -re-evaluate continued need    #Dizziness   - may be OH related  - Meclizine 12.5 BID PRN - meclizine d/c'd due to sedation 2/13  - Monitor symptoms     #Sleep/Mood  - Continue Trazodone and melatonin QHS  - Ambien discotinued as may be contributing to dizziness 2/13  - Recreation Therapy  - Neuropsych consult    #Prior CVA  - Continue ASA 81  - previously on atorvastatin 40 (now off since 1/2023) - LDL 80  - Follow up with PMD to repeat lipid panel and resume atorvastatin as indicated     #Seizure  - Continue Keppra   - Seizure precaution    #HTN  - Continue amlodipine and coreg   -  Monitor BP   - Reevaluate need due to possible symptomatic OH    #history of Diabetes per chart  - Follow up HbA1C  - FS 81 on fasting blood work 2/13  - Monitor glucose via lab    #Gout  - Allopurinol 200    #COVID positive (1/31) - completed isolation  - completed RDV x 5 Day  - Repeat COVID PCR positive 2/12   - Supportive care PRN     #Incidental Findings:  - 1.8 cm left apical lung mass, suspicious for malignancy-new from neck CT of 6/1/2023. ****Outpatient pulmonology follow-up for PET/CT and PFTs  - 2 rounded foci in the transverse colon. Differential includes adherent stool versus colonic lesions.****outpatient colonoscopy with Dr. Nelson.    #DVT Prophylaxis  - Lovenox SC    Discussed with patient and rehab team   
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 Pt displays difficulties with short-term memory with loss of auditory-verbal material over time, visuospatial skills (both visuomotor integration and figure-ground discrimination), and aspects of language (including fluency and writing) and executive functions (including organizational skills, abstract reasoning, and initiation). Pt's affect is mildly depressed but responds to humor, and he reports a few adjustment symptoms in response to his current medical condition and separation from home. FIM scores: Social Interaction 5; Problem Solving 5; Memory 5.    Plan: Individual supportive psychotherapy to monitor cognition, affect/mood, and behavior. Continue with memory-enhancing medication. Cognitive remediation during speech therapy and occupational therapy sessions is recommended. Participation in recreation/art therapy in order to have pleasure and mastery experiences and regain/reestablish a sense of routine. Outpatient neuropsychological testing in order to have a comprehensive baseline for tracking the progression of cognitive symptoms/deficits over time and response to treatment.    Time spent with Pt, 45 minutes.

## 2024-02-20 DIAGNOSIS — Z86.73 PERSONAL HISTORY OF TRANSIENT ISCHEMIC ATTACK (TIA), AND CEREBRAL INFARCTION WITHOUT RESIDUAL DEFICITS: ICD-10-CM

## 2024-02-20 DIAGNOSIS — Z98.84 BARIATRIC SURGERY STATUS: ICD-10-CM

## 2024-02-20 DIAGNOSIS — G89.29 OTHER CHRONIC PAIN: ICD-10-CM

## 2024-02-20 DIAGNOSIS — R56.9 UNSPECIFIED CONVULSIONS: ICD-10-CM

## 2024-02-20 DIAGNOSIS — Z79.84 LONG TERM (CURRENT) USE OF ORAL HYPOGLYCEMIC DRUGS: ICD-10-CM

## 2024-02-20 DIAGNOSIS — E11.9 TYPE 2 DIABETES MELLITUS WITHOUT COMPLICATIONS: ICD-10-CM

## 2024-02-20 DIAGNOSIS — N20.0 CALCULUS OF KIDNEY: ICD-10-CM

## 2024-02-20 DIAGNOSIS — M10.9 GOUT, UNSPECIFIED: ICD-10-CM

## 2024-02-20 DIAGNOSIS — K59.00 CONSTIPATION, UNSPECIFIED: ICD-10-CM

## 2024-02-20 DIAGNOSIS — K57.30 DIVERTICULOSIS OF LARGE INTESTINE WITHOUT PERFORATION OR ABSCESS WITHOUT BLEEDING: ICD-10-CM

## 2024-02-20 DIAGNOSIS — Z88.1 ALLERGY STATUS TO OTHER ANTIBIOTIC AGENTS STATUS: ICD-10-CM

## 2024-02-20 DIAGNOSIS — D69.59 OTHER SECONDARY THROMBOCYTOPENIA: ICD-10-CM

## 2024-02-20 DIAGNOSIS — M54.50 LOW BACK PAIN, UNSPECIFIED: ICD-10-CM

## 2024-02-20 DIAGNOSIS — N28.1 CYST OF KIDNEY, ACQUIRED: ICD-10-CM

## 2024-02-20 DIAGNOSIS — G20.B2 PARKINSON'S DISEASE WITH DYSKINESIA, WITH FLUCTUATIONS: ICD-10-CM

## 2024-02-20 DIAGNOSIS — Z88.0 ALLERGY STATUS TO PENICILLIN: ICD-10-CM

## 2024-02-20 DIAGNOSIS — G47.00 INSOMNIA, UNSPECIFIED: ICD-10-CM

## 2024-02-20 DIAGNOSIS — N21.0 CALCULUS IN BLADDER: ICD-10-CM

## 2024-02-20 DIAGNOSIS — E83.42 HYPOMAGNESEMIA: ICD-10-CM

## 2024-02-20 DIAGNOSIS — I10 ESSENTIAL (PRIMARY) HYPERTENSION: ICD-10-CM

## 2024-02-20 DIAGNOSIS — Z79.82 LONG TERM (CURRENT) USE OF ASPIRIN: ICD-10-CM

## 2024-02-20 DIAGNOSIS — R29.898 OTHER SYMPTOMS AND SIGNS INVOLVING THE MUSCULOSKELETAL SYSTEM: ICD-10-CM

## 2024-02-20 DIAGNOSIS — Z82.49 FAMILY HISTORY OF ISCHEMIC HEART DISEASE AND OTHER DISEASES OF THE CIRCULATORY SYSTEM: ICD-10-CM
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
## 2024-02-20 DIAGNOSIS — R91.8 OTHER NONSPECIFIC ABNORMAL FINDING OF LUNG FIELD: ICD-10-CM
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
## 2024-02-20 DIAGNOSIS — K80.20 CALCULUS OF GALLBLADDER WITHOUT CHOLECYSTITIS WITHOUT OBSTRUCTION: ICD-10-CM

## 2024-02-20 DIAGNOSIS — Z88.2 ALLERGY STATUS TO SULFONAMIDES: ICD-10-CM

## 2024-02-20 DIAGNOSIS — U07.1 COVID-19: ICD-10-CM

## 2024-02-20 DIAGNOSIS — R09.02 HYPOXEMIA: ICD-10-CM

## 2024-02-20 LAB
ALBUMIN SERPL ELPH-MCNC: 2.4 G/DL — LOW (ref 3.3–5)
ALP SERPL-CCNC: 71 U/L — SIGNIFICANT CHANGE UP (ref 40–120)
ALT FLD-CCNC: 12 U/L — SIGNIFICANT CHANGE UP (ref 10–45)
ANION GAP SERPL CALC-SCNC: 9 MMOL/L — SIGNIFICANT CHANGE UP (ref 5–17)
AST SERPL-CCNC: 21 U/L — SIGNIFICANT CHANGE UP (ref 10–40)
BILIRUB SERPL-MCNC: 0.6 MG/DL — SIGNIFICANT CHANGE UP (ref 0.2–1.2)
BUN SERPL-MCNC: 25 MG/DL — HIGH (ref 7–23)
CALCIUM SERPL-MCNC: 8.7 MG/DL — SIGNIFICANT CHANGE UP (ref 8.4–10.5)
CHLORIDE SERPL-SCNC: 106 MMOL/L — SIGNIFICANT CHANGE UP (ref 96–108)
CO2 SERPL-SCNC: 27 MMOL/L — SIGNIFICANT CHANGE UP (ref 22–31)
CREAT SERPL-MCNC: 1.04 MG/DL — SIGNIFICANT CHANGE UP (ref 0.5–1.3)
EGFR: 76 ML/MIN/1.73M2 — SIGNIFICANT CHANGE UP
GLUCOSE SERPL-MCNC: 93 MG/DL — SIGNIFICANT CHANGE UP (ref 70–99)
HCT VFR BLD CALC: 41.3 % — SIGNIFICANT CHANGE UP (ref 39–50)
HGB BLD-MCNC: 13.9 G/DL — SIGNIFICANT CHANGE UP (ref 13–17)
MCHC RBC-ENTMCNC: 33.7 GM/DL — SIGNIFICANT CHANGE UP (ref 32–36)
MCHC RBC-ENTMCNC: 33.7 PG — SIGNIFICANT CHANGE UP (ref 27–34)
MCV RBC AUTO: 100 FL — SIGNIFICANT CHANGE UP (ref 80–100)
NRBC # BLD: 0 /100 WBCS — SIGNIFICANT CHANGE UP (ref 0–0)
PLATELET # BLD AUTO: 149 K/UL — LOW (ref 150–400)
POTASSIUM SERPL-MCNC: 4.7 MMOL/L — SIGNIFICANT CHANGE UP (ref 3.5–5.3)
POTASSIUM SERPL-SCNC: 4.7 MMOL/L — SIGNIFICANT CHANGE UP (ref 3.5–5.3)
PROT SERPL-MCNC: 6.2 G/DL — SIGNIFICANT CHANGE UP (ref 6–8.3)
RBC # BLD: 4.13 M/UL — LOW (ref 4.2–5.8)
RBC # FLD: 12.4 % — SIGNIFICANT CHANGE UP (ref 10.3–14.5)
SODIUM SERPL-SCNC: 142 MMOL/L — SIGNIFICANT CHANGE UP (ref 135–145)
WBC # BLD: 7.31 K/UL — SIGNIFICANT CHANGE UP (ref 3.8–10.5)
WBC # FLD AUTO: 7.31 K/UL — SIGNIFICANT CHANGE UP (ref 3.8–10.5)

## 2024-02-20 PROCEDURE — 99232 SBSQ HOSP IP/OBS MODERATE 35: CPT

## 2024-02-20 RX ADMIN — RIVASTIGMINE 1 PATCH: 4.6 PATCH, EXTENDED RELEASE TRANSDERMAL at 08:04

## 2024-02-20 RX ADMIN — CARBIDOPA AND LEVODOPA 1 TABLET(S): 25; 100 TABLET ORAL at 22:06

## 2024-02-20 RX ADMIN — ENTACAPONE 200 MILLIGRAM(S): 200 TABLET, FILM COATED ORAL at 12:09

## 2024-02-20 RX ADMIN — CARBIDOPA AND LEVODOPA 2 TABLET(S): 25; 100 TABLET ORAL at 17:07

## 2024-02-20 RX ADMIN — CARBIDOPA AND LEVODOPA 2 TABLET(S): 25; 100 TABLET ORAL at 12:09

## 2024-02-20 RX ADMIN — Medication 200 MILLIGRAM(S): at 12:09

## 2024-02-20 RX ADMIN — AMLODIPINE BESYLATE 5 MILLIGRAM(S): 2.5 TABLET ORAL at 07:03

## 2024-02-20 RX ADMIN — LEVETIRACETAM 500 MILLIGRAM(S): 250 TABLET, FILM COATED ORAL at 17:06

## 2024-02-20 RX ADMIN — CARVEDILOL PHOSPHATE 3.12 MILLIGRAM(S): 80 CAPSULE, EXTENDED RELEASE ORAL at 17:07

## 2024-02-20 RX ADMIN — Medication 100 MILLIGRAM(S): at 22:07

## 2024-02-20 RX ADMIN — RIVASTIGMINE 1 PATCH: 4.6 PATCH, EXTENDED RELEASE TRANSDERMAL at 18:08

## 2024-02-20 RX ADMIN — LEVETIRACETAM 500 MILLIGRAM(S): 250 TABLET, FILM COATED ORAL at 07:04

## 2024-02-20 RX ADMIN — ENOXAPARIN SODIUM 40 MILLIGRAM(S): 100 INJECTION SUBCUTANEOUS at 18:08

## 2024-02-20 RX ADMIN — GABAPENTIN 100 MILLIGRAM(S): 400 CAPSULE ORAL at 22:07

## 2024-02-20 RX ADMIN — ENTACAPONE 200 MILLIGRAM(S): 200 TABLET, FILM COATED ORAL at 07:04

## 2024-02-20 RX ADMIN — POLYETHYLENE GLYCOL 3350 17 GRAM(S): 17 POWDER, FOR SOLUTION ORAL at 07:04

## 2024-02-20 RX ADMIN — Medication 6 MILLIGRAM(S): at 22:07

## 2024-02-20 RX ADMIN — LACTULOSE 20 GRAM(S): 10 SOLUTION ORAL at 12:09

## 2024-02-20 RX ADMIN — CARBIDOPA AND LEVODOPA 2 TABLET(S): 25; 100 TABLET ORAL at 07:03

## 2024-02-20 RX ADMIN — Medication 81 MILLIGRAM(S): at 12:09

## 2024-02-20 RX ADMIN — CARVEDILOL PHOSPHATE 3.12 MILLIGRAM(S): 80 CAPSULE, EXTENDED RELEASE ORAL at 07:04

## 2024-02-20 RX ADMIN — ENTACAPONE 200 MILLIGRAM(S): 200 TABLET, FILM COATED ORAL at 17:06

## 2024-02-20 NOTE — PROGRESS NOTE ADULT - SUBJECTIVE AND OBJECTIVE BOX
SUBJECTIVE/ROS: Patient seen in his bed. No new events overnight. Reports feeling good this morning. He states he feels better after sleeping longer this morning. Right foot swelling and pain over the weekend, XR and Doppler US negative. Reports spontaneous improvement. He denies chest pain, fever, chills, nausea, vomiting, abdominal pain, headache, or BLE pain.     HPI:  72 y/o male with PMH of Parkinson's disease (diagnosed 7 years ago), HTN, 2 x CVA (1998, 2016, minimal residual), seizure (2019 - on keppra), presented to [facility] ED on 1/31 after 2 falls. (of note, patient was admitted for same complaints in 09/22/2022). Trauma w/u was negative for acute traumatic injury. (CT chest, CT abdomen/pelvis, CT c-spine, CT head, xray pelvis, xray chest). Further workup for fall negative. Neurology evaluated the patient and feels that he is having morning freeze episodes secondary to wearing off of sinemet because of lack of long-acting version at bedtime; Sinemet regimen changed with improvement (sinemet 25/100 2 tab at 7am, 12 pm and 5pm; sinemet ER 50/200 1 tab HS (10pm)).     Hospital course complicated by:  - COVID positive on admission  - completed RDV x 5 Day.    - Incidental find of 1.8 cm left apical lung mass, suspicious for malignancy-new from neck CT of 6/1/2023. Will require outpatient pulmonology follow-up for PET/CT and PFTs  - CT abdomen showed: 2 rounded foci in the transverse colon. Differential includes adherent stool versus colonic lesions. Will require outpatient colonoscopy with Dr. Nelson.    Patient was evaluated by PM&R and therapy for functional deficits and gait/ADL impairments and recommend acute rehabilitation.  Patient was medically optimized for discharge to [place] on 2/12/2023    Vital Signs Last 24 Hrs  T(C): 36.5 (20 Feb 2024 07:53), Max: 36.7 (19 Feb 2024 21:57)  T(F): 97.7 (20 Feb 2024 07:53), Max: 98.1 (19 Feb 2024 21:57)  HR: 78 (20 Feb 2024 07:53) (78 - 97)  BP: 127/83 (20 Feb 2024 07:53) (117/74 - 127/83)  BP(mean): --  RR: 16 (20 Feb 2024 07:53) (16 - 16)  SpO2: 95% (20 Feb 2024 07:53) (94% - 95%)    Parameters below as of 20 Feb 2024 07:53  Patient On (Oxygen Delivery Method): room air      PHYSICAL EXAM  Constitutional - NAD, Comfortable  HEENT - NCAT, EOMI  Neck - Supple, No limited ROM  Chest - Breathing comfortably   Extremities - No C/C/E, minimal soft tissue swelling at the IV and V metatarso-phalangeal joints with minimal tenderness on palpation.     Neurologic Exam - alert, oriented, EOMI, mild hypomimia, mild hypophonia, no tremor.      MEDICATIONS  (STANDING):  allopurinol 200 milliGRAM(s) Oral daily  amLODIPine   Tablet 5 milliGRAM(s) Oral daily  aspirin  chewable 81 milliGRAM(s) Oral daily  carbidopa/levodopa  25/100 2 Tablet(s) Oral <User Schedule>  carbidopa/levodopa CR 50/200 1 Tablet(s) Oral <User Schedule>  carvedilol 3.125 milliGRAM(s) Oral every 12 hours  enoxaparin Injectable 40 milliGRAM(s) SubCutaneous every 24 hours  entacapone 200 milliGRAM(s) Oral <User Schedule>  gabapentin 100 milliGRAM(s) Oral at bedtime  lactulose Syrup 20 Gram(s) Oral daily  levETIRAcetam 500 milliGRAM(s) Oral two times a day  lidocaine   4% Patch 1 Patch Transdermal daily  melatonin 6 milliGRAM(s) Oral at bedtime  polyethylene glycol 3350 17 Gram(s) Oral every 12 hours  rivastigmine patch  9.5 mG/24 Hr(s). 1 Patch Transdermal every 24 hours  senna 2 Tablet(s) Oral at bedtime  traZODone 100 milliGRAM(s) Oral at bedtime    MEDICATIONS  (PRN):  acetaminophen     Tablet .. 650 milliGRAM(s) Oral every 6 hours PRN Mild Pain (1 - 3)

## 2024-02-20 NOTE — PROGRESS NOTE ADULT - ASSESSMENT
74 y/o male with PMH of Parkinson's disease (diagnosed 7 years ago), HTN, 2 x CVA (1998, 2016), seizure (2019 - on keppra), presented to [facility] ED on 1/31 after 2 falls. Fall w/u negative - fall attributed to PD regimen. Hospital course complicated by COVID positive on admission completed RDV x 5 Day.  Imaging with Incidental finding of 1.8 cm left apical lung mass, suspicious for malignancy-new from neck CT of 6/1/2023 and 2 rounded foci in the transverse colon. Differential includes adherent stool versus colonic lesions. Will require outpatient colonoscopy with Dr. Nelson. Patient was evaluated by PM&R and therapy for functional deficits and gait/ADL impairments and recommend acute rehabilitation.  Patient was medically optimized for discharge to [facility] on 2/12/2023    #Parkinson's related motor symptoms  #Dyskinesia/Rigidity/Bradykinesia  - Sinemet 25/100 2 tabs 7am, 12pm, 5pm  - Sinemet ER 50/200 1 tab HS  - Entacapone 200 1 tab at 7am, 12pm and 5pm   - Rivastigmine 9.5mg transdermal  - Fall precaution   - Movement disorders specialist to see  - Pain control and bowel regimen per rehab team     #Orthostatic hypotension  - Monitor OH vital signs  - TEDS and abd binders   - Currently with coreg and norvasc as pt with minimal symptoms     #Dizziness   - may be OH related  - Meclizine 12.5 BID PRN - meclizine d/c'd due to sedation 2/13  - Monitor symptoms     #Prior CVA  - Continue ASA 81  - previously on atorvastatin 40 (now off since 1/2023) - LDL 80  - Follow up with PMD to repeat lipid panel and resume atorvastatin as indicated     #Seizure  - Continue Keppra   - Seizure precaution    #HTN  - Continue amlodipine 5mg daily (reduced dose) and coreg 3.125mg BID   -  Monitor BP   - Reevaluate need due to possible symptomatic OH    #history of Diabetes per chart  - Follow up HbA1C  - FS 81 on fasting blood work 2/13  - Monitor glucose via lab    #Gout  - Allopurinol 200    #COVID positive (1/31) - completed isolation  - completed RDV x 5 Day  - Repeat COVID PCR positive 2/12   - Supportive care PRN     #Incidental Findings  - 1.8 cm left apical lung mass, suspicious for malignancy-new from neck CT of 6/1/2023. ****Outpatient pulmonology follow-up for PET/CT and PFTs  - 2 rounded foci in the transverse colon. Differential includes adherent stool versus colonic lesions.****outpatient colonoscopy with Dr. Nelson.    #DVT Prophylaxis  - Lovenox SC

## 2024-02-20 NOTE — PROGRESS NOTE ADULT - SUBJECTIVE AND OBJECTIVE BOX
[facility] Medicine  Dr. Anamaria Olmos  Progress Note    Patient is a 73y old  Male who presents with a chief complaint of Parkinson's Disease (18 Feb 2024 07:33)    Patient seen and examined at bedside. Denies any concerns. Feels well. Eating breakfast during visit.     Patient seen and examined at bedside.    ALLERGIES:  penicillin (Unknown)  sulfa drugs (Unknown)  cefaclor (Unknown)    MEDICATIONS  (STANDING):  allopurinol 200 milliGRAM(s) Oral daily  amLODIPine   Tablet 5 milliGRAM(s) Oral daily  aspirin  chewable 81 milliGRAM(s) Oral daily  carbidopa/levodopa  25/100 2 Tablet(s) Oral <User Schedule>  carbidopa/levodopa CR 50/200 1 Tablet(s) Oral <User Schedule>  carvedilol 3.125 milliGRAM(s) Oral every 12 hours  enoxaparin Injectable 40 milliGRAM(s) SubCutaneous every 24 hours  entacapone 200 milliGRAM(s) Oral <User Schedule>  gabapentin 100 milliGRAM(s) Oral at bedtime  lactulose Syrup 20 Gram(s) Oral daily  levETIRAcetam 500 milliGRAM(s) Oral two times a day  lidocaine   4% Patch 1 Patch Transdermal daily  melatonin 6 milliGRAM(s) Oral at bedtime  polyethylene glycol 3350 17 Gram(s) Oral every 12 hours  rivastigmine patch  9.5 mG/24 Hr(s). 1 Patch Transdermal every 24 hours  senna 2 Tablet(s) Oral at bedtime  traZODone 100 milliGRAM(s) Oral at bedtime    MEDICATIONS  (PRN):  acetaminophen     Tablet .. 650 milliGRAM(s) Oral every 6 hours PRN Mild Pain (1 - 3)    Vital Signs Last 24 Hrs  T(C): 36.5 (20 Feb 2024 07:53), Max: 36.7 (19 Feb 2024 21:57)  T(F): 97.7 (20 Feb 2024 07:53), Max: 98.1 (19 Feb 2024 21:57)  HR: 78 (20 Feb 2024 07:53) (78 - 97)  BP: 127/83 (20 Feb 2024 07:53) (117/74 - 127/83)  BP(mean): --  RR: 16 (20 Feb 2024 07:53) (16 - 16)  SpO2: 95% (20 Feb 2024 07:53) (94% - 95%)    Parameters below as of 20 Feb 2024 07:53  Patient On (Oxygen Delivery Method): room air      PHYSICAL EXAM:  General: NAD, A/O x 3  ENT: MMM  Neck: Supple, No JVD  Lungs: Clear to auscultation bilaterally  Cardio: RRR, S1/S2, No murmurs  Abdomen: Soft, Nontender, Nondistended; Bowel sounds present  Extremities: No calf tenderness, No pitting edema      LABS:                            13.9   7.31  )-----------( 149      ( 20 Feb 2024 06:03 )             41.3   02-20    142  |  106  |  25<H>  ----------------------------<  93  4.7   |  27  |  1.04    Ca    8.7      20 Feb 2024 06:03    TPro  6.2  /  Alb  2.4<L>  /  TBili  0.6  /  DBili  x   /  AST  21  /  ALT  12  /  AlkPhos  71  02-20

## 2024-02-20 NOTE — PROGRESS NOTE ADULT - ASSESSMENT
ASSESSMENT/PLAN  74 y/o male with PMH of Parkinson's disease (diagnosed 7 years ago), HTN, 2 x CVA (1998, 2016), seizure (2019 - on keppra), presented to [facility] ED on 1/31 after 2 falls. Fall w/u negative - attribute fall to PD regimen.    #Parkinson's Disease now with gait Instability, ADL impairments and Functional impairments  - Continue Comprehensive Rehab Program of PT/OT/SLP    ------------------------------------------------------  Parkinson's related motor symptoms    #Dyskinesia/Rigidity/Bradykinesia  - Sinemet 25/100 2 tabs 7am, 12pm, 5pm  - Entacapone 200 1 tab at 7am, 12pm and 5pm   - Sinemet ER 50/200 1 tab HS  - Rivastigmine 9.5mg transdermal   - Movement disorders following    --------------------------------------------------------  Parkinson's related non-motor symptoms    #Orthostatic hypotension  -Monitor OH vital signs  -Currently on carvedilol and norvasc - re-evaluate continued need    #Dizziness may be OH related  - Meclizine 12.5 BID PRN - discontinued meclizine due to sedation 2/13 - tolerating the change well  - Dizziness improving, no significant orthostasis     #Sleep/Mood  - Trazodone 100 at bedtime   - Melatonin 6mg HS  - Stop ambien as may be contributing to dizziness 2/13 - tolerating the change well  - Recreation Therapy  - Neuropsych consult (2/19/24): 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 Outpatient neuropsychological testing, individual supportive psychotherapy, c/w rivastigmine, c/w cognitive remediation through SLT and OT.     #Pain control  - Tylenol PRN, Lidocaine patch   - Muscle relaxant: Methocarbamol 750 TID PRN - stopped due to dizziness   - Continue Gabapentin 100mg at bedtime for LBP 2/13 - pain improvement noted     #GI/Bowel Management/Constipation  - Senna at bedtime, Miralax QD  - Lactulose PRN    #Bladder management  - Continue to monitor PVR q 8 hours (SC if > 400)  - Monitor UO    ----------------------------------------------------------  Concurrent medical problems    #Prior CVA  - ASA 81  - previously on atorvastatin 40 (now off since 1/2023) - LDL 80    #Seizure  - Keppra 500 BID    #HTN  - Amlodipine 5mg daily   - Carvedilol 3.125 BID  - Reevaluate need due to possible symptomatic OH - denies OH symptoms     #history of Diabetes per chart  - A1C 5.9  - FS has been <150  - Continue monitoring glucose via lab    #Gout  - Allopurinol 200    #COVID positive (1/31) - completed isolation  - completed RDV x 5 Day    #Incidental finds:  - 1.8 cm left apical lung mass, suspicious for malignancy-new from neck CT of 6/1/2023. ****Outpatient pulmonology follow-up for PET/CT and PFTs  - 2 rounded foci in the transverse colon. Differential includes adherent stool versus colonic lesions.****outpatient colonoscopy with Dr. Nelson.    #DVT Prophylaxis  - Lovenox  - TEDs     #Skin:  -No active issues at this time    FEN   - Diet - Regular + Thins  - Dysphagia  SLP - evaluation and treatment    Precautions / PROPHYLAXIS:   - Falls, Cardiac, Seizure   - ortho: Weight bearing status: WBAT   - Lungs: Aspiration  - Pressure injury/Skin:  OOB to Chair, PT/OT

## 2024-02-21 PROCEDURE — 99232 SBSQ HOSP IP/OBS MODERATE 35: CPT

## 2024-02-21 RX ADMIN — CARBIDOPA AND LEVODOPA 2 TABLET(S): 25; 100 TABLET ORAL at 11:58

## 2024-02-21 RX ADMIN — CARBIDOPA AND LEVODOPA 2 TABLET(S): 25; 100 TABLET ORAL at 06:53

## 2024-02-21 RX ADMIN — ENOXAPARIN SODIUM 40 MILLIGRAM(S): 100 INJECTION SUBCUTANEOUS at 17:17

## 2024-02-21 RX ADMIN — LEVETIRACETAM 500 MILLIGRAM(S): 250 TABLET, FILM COATED ORAL at 17:17

## 2024-02-21 RX ADMIN — ENTACAPONE 200 MILLIGRAM(S): 200 TABLET, FILM COATED ORAL at 11:57

## 2024-02-21 RX ADMIN — CARVEDILOL PHOSPHATE 3.12 MILLIGRAM(S): 80 CAPSULE, EXTENDED RELEASE ORAL at 06:53

## 2024-02-21 RX ADMIN — Medication 200 MILLIGRAM(S): at 11:58

## 2024-02-21 RX ADMIN — ENTACAPONE 200 MILLIGRAM(S): 200 TABLET, FILM COATED ORAL at 06:53

## 2024-02-21 RX ADMIN — ENTACAPONE 200 MILLIGRAM(S): 200 TABLET, FILM COATED ORAL at 17:16

## 2024-02-21 RX ADMIN — Medication 6 MILLIGRAM(S): at 22:01

## 2024-02-21 RX ADMIN — AMLODIPINE BESYLATE 5 MILLIGRAM(S): 2.5 TABLET ORAL at 06:53

## 2024-02-21 RX ADMIN — CARBIDOPA AND LEVODOPA 1 TABLET(S): 25; 100 TABLET ORAL at 22:00

## 2024-02-21 RX ADMIN — GABAPENTIN 100 MILLIGRAM(S): 400 CAPSULE ORAL at 22:01

## 2024-02-21 RX ADMIN — RIVASTIGMINE 1 PATCH: 4.6 PATCH, EXTENDED RELEASE TRANSDERMAL at 07:59

## 2024-02-21 RX ADMIN — CARBIDOPA AND LEVODOPA 2 TABLET(S): 25; 100 TABLET ORAL at 17:16

## 2024-02-21 RX ADMIN — LEVETIRACETAM 500 MILLIGRAM(S): 250 TABLET, FILM COATED ORAL at 06:52

## 2024-02-21 RX ADMIN — RIVASTIGMINE 1 PATCH: 4.6 PATCH, EXTENDED RELEASE TRANSDERMAL at 17:17

## 2024-02-21 RX ADMIN — LIDOCAINE 1 PATCH: 4 CREAM TOPICAL at 11:58

## 2024-02-21 RX ADMIN — Medication 81 MILLIGRAM(S): at 11:58

## 2024-02-21 RX ADMIN — Medication 100 MILLIGRAM(S): at 22:02

## 2024-02-21 RX ADMIN — LIDOCAINE 1 PATCH: 4 CREAM TOPICAL at 23:35

## 2024-02-21 RX ADMIN — CARVEDILOL PHOSPHATE 3.12 MILLIGRAM(S): 80 CAPSULE, EXTENDED RELEASE ORAL at 17:17

## 2024-02-21 NOTE — CHART NOTE - NSCHARTNOTEFT_GEN_A_CORE
Nutrition Follow Up Note  Source: Medical Record [X] Patient [X] Family [ ]         Diet: Regular, Ensure Plus High Protein (provides 350 kcal, 20 g protein/serving) TID  Pt tolerating diet with good PO intake, eating >75% of meals Per nursing flowsheets. Pt reports good PO intake of meals + good acceptance of Ensure Plus High Protein (provides 350 kcal, 20 g protein/serving). Denies nausea, vomiting, diarrhea, constipation. Reviewed current diet, med/nutrient interactions, nutrition recommendations.    Enteral/Parenteral Nutrition: N/A    Current Weight: 147 lbs (2-12)    Pertinent Medications: MEDICATIONS  (STANDING):  allopurinol 200 milliGRAM(s) Oral daily  amLODIPine   Tablet 5 milliGRAM(s) Oral daily  aspirin  chewable 81 milliGRAM(s) Oral daily  carbidopa/levodopa  25/100 2 Tablet(s) Oral <User Schedule>  carbidopa/levodopa CR 50/200 1 Tablet(s) Oral <User Schedule>  carvedilol 3.125 milliGRAM(s) Oral every 12 hours  enoxaparin Injectable 40 milliGRAM(s) SubCutaneous every 24 hours  entacapone 200 milliGRAM(s) Oral <User Schedule>  gabapentin 100 milliGRAM(s) Oral at bedtime  lactulose Syrup 20 Gram(s) Oral daily  levETIRAcetam 500 milliGRAM(s) Oral two times a day  lidocaine   4% Patch 1 Patch Transdermal daily  melatonin 6 milliGRAM(s) Oral at bedtime  polyethylene glycol 3350 17 Gram(s) Oral every 12 hours  rivastigmine patch  9.5 mG/24 Hr(s). 1 Patch Transdermal every 24 hours  senna 2 Tablet(s) Oral at bedtime  traZODone 100 milliGRAM(s) Oral at bedtime    MEDICATIONS  (PRN):  acetaminophen     Tablet .. 650 milliGRAM(s) Oral every 6 hours PRN Mild Pain (1 - 3)      Pertinent Labs:  02-20 Na142 mmol/L Glu 93 mg/dL K+ 4.7 mmol/L Cr  1.04 mg/dL BUN 25 mg/dL<H> 02-20 Alb 2.4 g/dL<L> 02-02 Chol 144 mg/dL LDL --    HDL 46 mg/dL Trig 93 mg/dL        Skin: Skin intact per nursing flow sheets     Edema: No edema per nursing flow sheets     Last BM: on 2-19 Per nursing flowsheets     Estimated Needs:   [X] No Change since Previous Assessment  [ ] Recalculated:     Previous Nutrition Diagnosis:   Severe malnutrition    Nutrition Diagnosis is [X] Ongoing  - addressed with Ensure Plus High Protein (provides 350 kcal, 20 g protein/serving) TID     New Nutrition Diagnosis: [X] Not Applicable  [ ] Inadequate Protein Energy Intake   [ ] Inadequate Oral Intake   [ ] Excessive Energy Intake   [ ] Increased Nutrient Needs   [ ] Obesity   [ ] Altered GI Function   [ ] Unintended Weight Loss   [ ] Food & Nutrition Related Knowledge Deficit  [ ] Limited Adherence to nutrition related recommendations   [ ] Malnutrition      Interventions:   1. Recommend continuing with current plan of care  2. Encourage PO intake  3. Diet education    Monitoring & Evaluation:   [X] Weights   [X] PO Intake   [X] Follow Up (Per Protocol)  [X] Tolerance to Diet Prescription   [X] Other: Labs    RD Remains Available.  Jeanna Andrade RD

## 2024-02-21 NOTE — PROGRESS NOTE ADULT - SUBJECTIVE AND OBJECTIVE BOX
SUBJECTIVE/ROS: Patient seen at bedside. No new events overnight. Currently a little tired after PT, but overall good. States he slept well. No more pain in the right foot. No significant orthostasis. Happy with current antiparkinsonian medication regimen. He also denies chest pain, fever, chills, nausea, vomiting, abdominal pain, headache, or BLE pain. Resistant to be supervised in the bathroom. Will reassess level of supervision with PT/OT.    HPI:  74 y/o male with PMH of Parkinson's disease (diagnosed 7 years ago), HTN, 2 x CVA (1998, 2016, minimal residual), seizure (2019 - on keppra), presented to [facility] ED on 1/31 after 2 falls. (of note, patient was admitted for same complaints in 09/22/2022). Trauma w/u was negative for acute traumatic injury. (CT chest, CT abdomen/pelvis, CT c-spine, CT head, xray pelvis, xray chest). Further workup for fall negative. Neurology evaluated the patient and feels that he is having morning freeze episodes secondary to wearing off of sinemet because of lack of long-acting version at bedtime; Sinemet regimen changed with improvement (sinemet 25/100 2 tab at 7am, 12 pm and 5pm; sinemet ER 50/200 1 tab HS (10pm)).     Hospital course complicated by:  - COVID positive on admission  - completed RDV x 5 Day.    - Incidental find of 1.8 cm left apical lung mass, suspicious for malignancy-new from neck CT of 6/1/2023. Will require outpatient pulmonology follow-up for PET/CT and PFTs  - CT abdomen showed: 2 rounded foci in the transverse colon. Differential includes adherent stool versus colonic lesions. Will require outpatient colonoscopy with Dr. Nelson.    Patient was evaluated by PM&R and therapy for functional deficits and gait/ADL impairments and recommend acute rehabilitation.  Patient was medically optimized for discharge [facility] on 2/12/2023    Vital Signs Last 24 Hrs  T(C): 36.9 (21 Feb 2024 09:40), Max: 36.9 (21 Feb 2024 09:40)  T(F): 98.5 (21 Feb 2024 09:40), Max: 98.5 (21 Feb 2024 09:40)  HR: 89 (20 Feb 2024 22:00) (89 - 89)  BP: 114/77 (20 Feb 2024 22:00) (114/77 - 114/77)  BP(mean): --  RR: 16 (21 Feb 2024 09:40) (16 - 16)  SpO2: 95% (21 Feb 2024 09:40) (95% - 95%)    Parameters below as of 21 Feb 2024 09:40  Patient On (Oxygen Delivery Method): room air    PHYSICAL EXAM  Constitutional - NAD, Comfortable  HEENT - NCAT, EOMI  Neck - Supple, No limited ROM  Chest - Breathing comfortably   Extremities - No C/C/E, minimal soft tissue swelling at the IV and V metatarso-phalangeal joints, no pain.      Neurologic Exam - alert, oriented, EOMI, mild hypomimia, mild hypophonia, normal tone and ROM in neck, minimal bilateral rigidity of UEs at wrists, mild intermittent rest tremor of the left hand, mild bradykinesia worse on the left side. Able to stand from the chair without using hands, walking independently with good stride and lexi. Stands safely but falls on pull test.       MEDICATIONS  (STANDING):  allopurinol 200 milliGRAM(s) Oral daily  amLODIPine   Tablet 5 milliGRAM(s) Oral daily  aspirin  chewable 81 milliGRAM(s) Oral daily  carbidopa/levodopa  25/100 2 Tablet(s) Oral <User Schedule>  carbidopa/levodopa CR 50/200 1 Tablet(s) Oral <User Schedule>  carvedilol 3.125 milliGRAM(s) Oral every 12 hours  enoxaparin Injectable 40 milliGRAM(s) SubCutaneous every 24 hours  entacapone 200 milliGRAM(s) Oral <User Schedule>  gabapentin 100 milliGRAM(s) Oral at bedtime  lactulose Syrup 20 Gram(s) Oral daily  levETIRAcetam 500 milliGRAM(s) Oral two times a day  lidocaine   4% Patch 1 Patch Transdermal daily  melatonin 6 milliGRAM(s) Oral at bedtime  polyethylene glycol 3350 17 Gram(s) Oral every 12 hours  rivastigmine patch  9.5 mG/24 Hr(s). 1 Patch Transdermal every 24 hours  senna 2 Tablet(s) Oral at bedtime  traZODone 100 milliGRAM(s) Oral at bedtime    MEDICATIONS  (PRN):  acetaminophen     Tablet .. 650 milliGRAM(s) Oral every 6 hours PRN Mild Pain (1 - 3)

## 2024-02-21 NOTE — PROGRESS NOTE ADULT - ASSESSMENT
72 y/o male with PMH of Parkinson's disease (diagnosed 7 years ago), HTN, 2 x CVA (1998, 2016), seizure (2019 - on keppra), presented to [facility] ED on 1/31 after 2 falls. Fall w/u negative - fall attributed to PD regimen. Hospital course complicated by COVID positive on admission completed RDV x 5 Day.  Imaging with Incidental finding of 1.8 cm left apical lung mass, suspicious for malignancy-new from neck CT of 6/1/2023 and 2 rounded foci in the transverse colon. Differential includes adherent stool versus colonic lesions. Will require outpatient colonoscopy with Dr. Nelson. Patient was evaluated by PM&R and therapy for functional deficits and gait/ADL impairments and recommend acute rehabilitation.  Patient was medically optimized for discharge [facility] on 2/12/2023    #Parkinson's related motor symptoms  #Dyskinesia/Rigidity/Bradykinesia  - Sinemet 25/100 2 tabs 7am, 12pm, 5pm  - Sinemet ER 50/200 1 tab HS  - Entacapone 200 1 tab at 7am, 12pm and 5pm   - Rivastigmine 9.5mg transdermal  - Fall precaution   - Movement disorders specialist to see  - Pain control and bowel regimen per rehab team     #Orthostatic hypotension  - Monitor OH vital signs  - TEDS and abd binders   - Currently with coreg and norvasc as pt with minimal symptoms     #Dizziness   - may be OH related  - Meclizine 12.5 BID PRN - meclizine d/c'd due to sedation 2/13  - Monitor symptoms     #Prior CVA  - Continue ASA 81  - previously on atorvastatin 40 (now off since 1/2023) - LDL 80  - Follow up with PMD to repeat lipid panel and resume atorvastatin as indicated     #Seizure  - Continue Keppra   - Seizure precaution    #HTN  - Continue amlodipine 5mg daily (reduced dose) and coreg 3.125mg BID   -  Monitor BP   - Reevaluate need due to possible symptomatic OH    #history of Diabetes per chart  - HbA1C 5.9  - Monitor glucose via lab    #Gout  - Allopurinol 200    #COVID positive (1/31) - completed isolation  - completed RDV x 5 Day  - Repeat COVID PCR positive 2/12   - Supportive care PRN     #Incidental Findings  - 1.8 cm left apical lung mass, suspicious for malignancy-new from neck CT of 6/1/2023. ****Outpatient pulmonology follow-up for PET/CT and PFTs  - 2 rounded foci in the transverse colon. Differential includes adherent stool versus colonic lesions.****outpatient colonoscopy with Dr. Nelson.    #DVT Prophylaxis  - Lovenox SC

## 2024-02-21 NOTE — PROGRESS NOTE ADULT - SUBJECTIVE AND OBJECTIVE BOX
[facility] Medicine  Dr. Anamaria Olmos  Progress Note    Patient is a 73y old  Male who presents with a chief complaint of Parkinson's Disease (18 Feb 2024 07:33)    Patient seen and examined at bedside. Denies any concerns.     ALLERGIES:  penicillin (Unknown)  sulfa drugs (Unknown)  cefaclor (Unknown)    MEDICATIONS  (STANDING):  allopurinol 200 milliGRAM(s) Oral daily  amLODIPine   Tablet 5 milliGRAM(s) Oral daily  aspirin  chewable 81 milliGRAM(s) Oral daily  carbidopa/levodopa  25/100 2 Tablet(s) Oral <User Schedule>  carbidopa/levodopa CR 50/200 1 Tablet(s) Oral <User Schedule>  carvedilol 3.125 milliGRAM(s) Oral every 12 hours  enoxaparin Injectable 40 milliGRAM(s) SubCutaneous every 24 hours  entacapone 200 milliGRAM(s) Oral <User Schedule>  gabapentin 100 milliGRAM(s) Oral at bedtime  lactulose Syrup 20 Gram(s) Oral daily  levETIRAcetam 500 milliGRAM(s) Oral two times a day  lidocaine   4% Patch 1 Patch Transdermal daily  melatonin 6 milliGRAM(s) Oral at bedtime  polyethylene glycol 3350 17 Gram(s) Oral every 12 hours  rivastigmine patch  9.5 mG/24 Hr(s). 1 Patch Transdermal every 24 hours  senna 2 Tablet(s) Oral at bedtime  traZODone 100 milliGRAM(s) Oral at bedtime    MEDICATIONS  (PRN):  acetaminophen     Tablet .. 650 milliGRAM(s) Oral every 6 hours PRN Mild Pain (1 - 3)    Vital Signs Last 24 Hrs  T(C): 36.9 (21 Feb 2024 09:40), Max: 36.9 (21 Feb 2024 09:40)  T(F): 98.5 (21 Feb 2024 09:40), Max: 98.5 (21 Feb 2024 09:40)  HR: 89 (20 Feb 2024 22:00) (89 - 89)  BP: 114/77 (20 Feb 2024 22:00) (114/77 - 114/77)  BP(mean): --  RR: 16 (21 Feb 2024 09:40) (16 - 16)  SpO2: 95% (21 Feb 2024 09:40) (95% - 95%)    Parameters below as of 21 Feb 2024 09:40  Patient On (Oxygen Delivery Method): room air    PHYSICAL EXAM:  General: NAD, A/O x 3  ENT: MMM  Neck: Supple, No JVD  Lungs: Clear to auscultation bilaterally  Cardio: RRR, S1/S2, No murmurs  Abdomen: Soft, Nontender, Nondistended; Bowel sounds present  Extremities: No calf tenderness, No pitting edema      LABS:                            13.9   7.31  )-----------( 149      ( 20 Feb 2024 06:03 )             41.3   02-20    142  |  106  |  25<H>  ----------------------------<  93  4.7   |  27  |  1.04    Ca    8.7      20 Feb 2024 06:03    TPro  6.2  /  Alb  2.4<L>  /  TBili  0.6  /  DBili  x   /  AST  21  /  ALT  12  /  AlkPhos  71  02-20

## 2024-02-22 LAB
ALBUMIN SERPL ELPH-MCNC: 2.5 G/DL — LOW (ref 3.3–5)
ALP SERPL-CCNC: 66 U/L — SIGNIFICANT CHANGE UP (ref 40–120)
ALT FLD-CCNC: 10 U/L — SIGNIFICANT CHANGE UP (ref 10–45)
ANION GAP SERPL CALC-SCNC: 9 MMOL/L — SIGNIFICANT CHANGE UP (ref 5–17)
AST SERPL-CCNC: 14 U/L — SIGNIFICANT CHANGE UP (ref 10–40)
BILIRUB SERPL-MCNC: 0.8 MG/DL — SIGNIFICANT CHANGE UP (ref 0.2–1.2)
BUN SERPL-MCNC: 25 MG/DL — HIGH (ref 7–23)
CALCIUM SERPL-MCNC: 8.7 MG/DL — SIGNIFICANT CHANGE UP (ref 8.4–10.5)
CHLORIDE SERPL-SCNC: 107 MMOL/L — SIGNIFICANT CHANGE UP (ref 96–108)
CO2 SERPL-SCNC: 27 MMOL/L — SIGNIFICANT CHANGE UP (ref 22–31)
CREAT SERPL-MCNC: 1.01 MG/DL — SIGNIFICANT CHANGE UP (ref 0.5–1.3)
EGFR: 79 ML/MIN/1.73M2 — SIGNIFICANT CHANGE UP
GLUCOSE SERPL-MCNC: 91 MG/DL — SIGNIFICANT CHANGE UP (ref 70–99)
HCT VFR BLD CALC: 40.4 % — SIGNIFICANT CHANGE UP (ref 39–50)
HGB BLD-MCNC: 13.6 G/DL — SIGNIFICANT CHANGE UP (ref 13–17)
MCHC RBC-ENTMCNC: 33.7 GM/DL — SIGNIFICANT CHANGE UP (ref 32–36)
MCHC RBC-ENTMCNC: 33.7 PG — SIGNIFICANT CHANGE UP (ref 27–34)
MCV RBC AUTO: 100 FL — SIGNIFICANT CHANGE UP (ref 80–100)
NRBC # BLD: 0 /100 WBCS — SIGNIFICANT CHANGE UP (ref 0–0)
PLATELET # BLD AUTO: 138 K/UL — LOW (ref 150–400)
POTASSIUM SERPL-MCNC: 4.8 MMOL/L — SIGNIFICANT CHANGE UP (ref 3.5–5.3)
POTASSIUM SERPL-SCNC: 4.8 MMOL/L — SIGNIFICANT CHANGE UP (ref 3.5–5.3)
PROT SERPL-MCNC: 6.2 G/DL — SIGNIFICANT CHANGE UP (ref 6–8.3)
RBC # BLD: 4.04 M/UL — LOW (ref 4.2–5.8)
RBC # FLD: 12.3 % — SIGNIFICANT CHANGE UP (ref 10.3–14.5)
SODIUM SERPL-SCNC: 143 MMOL/L — SIGNIFICANT CHANGE UP (ref 135–145)
WBC # BLD: 7.87 K/UL — SIGNIFICANT CHANGE UP (ref 3.8–10.5)
WBC # FLD AUTO: 7.87 K/UL — SIGNIFICANT CHANGE UP (ref 3.8–10.5)

## 2024-02-22 PROCEDURE — 99232 SBSQ HOSP IP/OBS MODERATE 35: CPT

## 2024-02-22 RX ADMIN — RIVASTIGMINE 1 PATCH: 4.6 PATCH, EXTENDED RELEASE TRANSDERMAL at 07:00

## 2024-02-22 RX ADMIN — LACTULOSE 20 GRAM(S): 10 SOLUTION ORAL at 11:43

## 2024-02-22 RX ADMIN — GABAPENTIN 100 MILLIGRAM(S): 400 CAPSULE ORAL at 21:57

## 2024-02-22 RX ADMIN — Medication 81 MILLIGRAM(S): at 11:43

## 2024-02-22 RX ADMIN — CARBIDOPA AND LEVODOPA 2 TABLET(S): 25; 100 TABLET ORAL at 17:01

## 2024-02-22 RX ADMIN — CARBIDOPA AND LEVODOPA 1 TABLET(S): 25; 100 TABLET ORAL at 21:57

## 2024-02-22 RX ADMIN — RIVASTIGMINE 1 PATCH: 4.6 PATCH, EXTENDED RELEASE TRANSDERMAL at 17:17

## 2024-02-22 RX ADMIN — Medication 100 MILLIGRAM(S): at 21:57

## 2024-02-22 RX ADMIN — CARVEDILOL PHOSPHATE 3.12 MILLIGRAM(S): 80 CAPSULE, EXTENDED RELEASE ORAL at 17:02

## 2024-02-22 RX ADMIN — Medication 200 MILLIGRAM(S): at 11:43

## 2024-02-22 RX ADMIN — ENOXAPARIN SODIUM 40 MILLIGRAM(S): 100 INJECTION SUBCUTANEOUS at 18:31

## 2024-02-22 RX ADMIN — ENTACAPONE 200 MILLIGRAM(S): 200 TABLET, FILM COATED ORAL at 06:54

## 2024-02-22 RX ADMIN — RIVASTIGMINE 1 PATCH: 4.6 PATCH, EXTENDED RELEASE TRANSDERMAL at 18:21

## 2024-02-22 RX ADMIN — AMLODIPINE BESYLATE 5 MILLIGRAM(S): 2.5 TABLET ORAL at 06:54

## 2024-02-22 RX ADMIN — CARBIDOPA AND LEVODOPA 2 TABLET(S): 25; 100 TABLET ORAL at 11:45

## 2024-02-22 RX ADMIN — LEVETIRACETAM 500 MILLIGRAM(S): 250 TABLET, FILM COATED ORAL at 17:01

## 2024-02-22 RX ADMIN — CARVEDILOL PHOSPHATE 3.12 MILLIGRAM(S): 80 CAPSULE, EXTENDED RELEASE ORAL at 06:55

## 2024-02-22 RX ADMIN — Medication 6 MILLIGRAM(S): at 21:57

## 2024-02-22 RX ADMIN — CARBIDOPA AND LEVODOPA 2 TABLET(S): 25; 100 TABLET ORAL at 06:54

## 2024-02-22 RX ADMIN — ENTACAPONE 200 MILLIGRAM(S): 200 TABLET, FILM COATED ORAL at 17:02

## 2024-02-22 RX ADMIN — LEVETIRACETAM 500 MILLIGRAM(S): 250 TABLET, FILM COATED ORAL at 06:54

## 2024-02-22 RX ADMIN — ENTACAPONE 200 MILLIGRAM(S): 200 TABLET, FILM COATED ORAL at 11:43

## 2024-02-22 NOTE — PROGRESS NOTE ADULT - SUBJECTIVE AND OBJECTIVE BOX
SUBJECTIVE/ROS: Patient seen at bedside. No new events overnight. States feeling more energized today. Able to walk to the bathroom with rolling walker per PT. He denies chest pain, fever, chills, nausea, vomiting, abdominal pain, headache, or BLE pain. No significant orthostasis but reported dizziness, encouraged to increase water intake. Patient and therapist noticed involuntary right foot inversion when walking, on examination consistent with dystonic posturing. Patient states not being aware of the movement most of the times, denies pain associated with it, and it objectively does not interfere with walking in a meaningful way. Discussed that this is a symptom associated to PD, and trial of botulinum toxin could help if it becomes more bothersome.     HPI:  72 y/o male with PMH of Parkinson's disease (diagnosed 7 years ago), HTN, 2 x CVA (1998, 2016, minimal residual), seizure (2019 - on keppra), presented to [facility] ED on 1/31 after 2 falls. (of note, patient was admitted for same complaints in 09/22/2022). Trauma w/u was negative for acute traumatic injury. (CT chest, CT abdomen/pelvis, CT c-spine, CT head, xray pelvis, xray chest). Further workup for fall negative. Neurology evaluated the patient and feels that he is having morning freeze episodes secondary to wearing off of sinemet because of lack of long-acting version at bedtime; Sinemet regimen changed with improvement (sinemet 25/100 2 tab at 7am, 12 pm and 5pm; sinemet ER 50/200 1 tab HS (10pm)).     Hospital course complicated by:  - COVID positive on admission  - completed RDV x 5 Day.    - Incidental find of 1.8 cm left apical lung mass, suspicious for malignancy-new from neck CT of 6/1/2023. Will require outpatient pulmonology follow-up for PET/CT and PFTs  - CT abdomen showed: 2 rounded foci in the transverse colon. Differential includes adherent stool versus colonic lesions. Will require outpatient colonoscopy with Dr. Nelson.    Patient was evaluated by PM&R and therapy for functional deficits and gait/ADL impairments and recommend acute rehabilitation.  Patient was medically optimized for discharge to [facility] on 2/12/2023      LABS:                          13.6   7.87  )-----------( 138      ( 22 Feb 2024 07:39 )             40.4     02-22    143  |  107  |  25<H>  ----------------------------<  91  4.8   |  27  |  1.01    Ca    8.7      22 Feb 2024 07:39    TPro  6.2  /  Alb  2.5<L>  /  TBili  0.8  /  DBili  x   /  AST  14  /  ALT  10  /  AlkPhos  66  02-22    LIVER FUNCTIONS - ( 22 Feb 2024 07:39 )  Alb: 2.5 g/dL / Pro: 6.2 g/dL / ALK PHOS: 66 U/L / ALT: 10 U/L / AST: 14 U/L / GGT: x             Vital Signs Last 24 Hrs  T(C): 36.6 (22 Feb 2024 08:38), Max: 36.6 (22 Feb 2024 08:38)  T(F): 97.9 (22 Feb 2024 08:38), Max: 97.9 (22 Feb 2024 08:38)  HR: 96 (22 Feb 2024 08:38) (81 - 96)  BP: 131/77 (22 Feb 2024 08:38) (109/70 - 131/77)  BP(mean): --  RR: 16 (22 Feb 2024 08:38) (16 - 16)  SpO2: 95% (22 Feb 2024 08:38) (95% - 95%)    Parameters below as of 22 Feb 2024 08:38  Patient On (Oxygen Delivery Method): room air    PHYSICAL EXAM  Constitutional - NAD, Comfortable  HEENT - NCAT, EOMI  Neck - Supple, No limited ROM  Chest - Breathing comfortably   Extremities - No C/C/E, TEDs in place, significant valgismus of right hallux     Neurologic Exam - alert, oriented, EOMI, mild hypomimia, mild hypophonia, slight tremor of the left > right hand, mild bradykinesia worse on the left side. Inversion of right foot with mental exertion and upon walking with striatal toe. Able to stand from the chair without using hands, walks independently with good stride and lexi.    MEDICATIONS  (STANDING):  allopurinol 200 milliGRAM(s) Oral daily  amLODIPine   Tablet 5 milliGRAM(s) Oral daily  aspirin  chewable 81 milliGRAM(s) Oral daily  carbidopa/levodopa  25/100 2 Tablet(s) Oral <User Schedule>  carbidopa/levodopa CR 50/200 1 Tablet(s) Oral <User Schedule>  carvedilol 3.125 milliGRAM(s) Oral every 12 hours  enoxaparin Injectable 40 milliGRAM(s) SubCutaneous every 24 hours  entacapone 200 milliGRAM(s) Oral <User Schedule>  gabapentin 100 milliGRAM(s) Oral at bedtime  lactulose Syrup 20 Gram(s) Oral daily  levETIRAcetam 500 milliGRAM(s) Oral two times a day  lidocaine   4% Patch 1 Patch Transdermal daily  melatonin 6 milliGRAM(s) Oral at bedtime  polyethylene glycol 3350 17 Gram(s) Oral every 12 hours  rivastigmine patch  9.5 mG/24 Hr(s). 1 Patch Transdermal every 24 hours  senna 2 Tablet(s) Oral at bedtime  traZODone 100 milliGRAM(s) Oral at bedtime    MEDICATIONS  (PRN):  acetaminophen     Tablet .. 650 milliGRAM(s) Oral every 6 hours PRN Mild Pain (1 - 3)

## 2024-02-22 NOTE — PROGRESS NOTE ADULT - ASSESSMENT
ASSESSMENT/PLAN  72 y/o male with PMH of Parkinson's disease (diagnosed 7 years ago), HTN, 2 x CVA (1998, 2016), seizure (2019 - on keppra), [facility]. Fall w/u negative - attribute fall to PD regimen.    #Parkinson's Disease now with gait Instability, ADL impairments and Functional impairments  - Continue Comprehensive Rehab Program of PT/OT/SLP    ------------------------------------------------------  Parkinson's related motor symptoms    #Dyskinesia/Rigidity/Bradykinesia  - Sinemet 25/100 2 tabs 7am, 12pm, 5pm  - Entacapone 200 1 tab at 7am, 12pm and 5pm   - Sinemet ER 50/200 1 tab HS  - Rivastigmine 9.5mg transdermal   - Movement disorders following    --------------------------------------------------------  Parkinson's related non-motor symptoms    #Orthostatic hypotension  -Monitor OH vital signs  -Currently on carvedilol and norvasc - re-evaluate continued need    #Dizziness may be OH related  - Meclizine 12.5 BID PRN - discontinued meclizine due to sedation 2/13 - tolerating the change well  - Dizziness improving, no significant orthostasis     #Sleep/Mood  - Trazodone 100 at bedtime   - Melatonin 6mg HS  - Stop ambien as may be contributing to dizziness 2/13 - tolerating the change well  - Recreation Therapy  - Neuropsych consult (2/19/24): 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 Outpatient neuropsychological testing, individual supportive psychotherapy, c/w rivastigmine, c/w cognitive remediation through SLT and OT.     #Pain control  - Tylenol PRN, Lidocaine patch   - Muscle relaxant: Methocarbamol 750 TID PRN - stopped due to dizziness   - Continue Gabapentin 100mg at bedtime for LBP 2/13 - pain improvement noted     #GI/Bowel Management/Constipation  - Senna at bedtime, Miralax QD  - Lactulose PRN    #Bladder management  - no significant PVR, d/c monitor    ----------------------------------------------------------  Concurrent medical problems    #Prior CVA  - ASA 81  - previously on atorvastatin 40 (now off since 1/2023) - LDL 80    #Seizure  - Keppra 500 BID    #HTN  - Amlodipine 5mg daily   - Carvedilol 3.125 BID  - No significant OH at present     #history of Diabetes per chart  - A1C 5.9  - FS has been <150  - Continue monitoring glucose via lab    #Gout  - Allopurinol 200    #COVID positive (1/31) - completed isolation  - completed RDV x 5 Day    #Incidental finds:  - 1.8 cm left apical lung mass, suspicious for malignancy-new from neck CT of 6/1/2023. ****Outpatient pulmonology follow-up for PET/CT and PFTs  - 2 rounded foci in the transverse colon. Differential includes adherent stool versus colonic lesions.****outpatient colonoscopy with Dr. Nelson.    #DVT Prophylaxis  - Lovenox  - TEDs     #Skin:  -No active issues at this time    FEN   - Diet - Regular + Thins  - Dysphagia  SLP - evaluation and treatment    Precautions / PROPHYLAXIS:   - Falls, Cardiac, Seizure   - ortho: Weight bearing status: WBAT   - Lungs: Aspiration  - Pressure injury/Skin:  OOB to Chair, PT/OT

## 2024-02-23 PROCEDURE — 99232 SBSQ HOSP IP/OBS MODERATE 35: CPT

## 2024-02-23 RX ADMIN — RIVASTIGMINE 1 PATCH: 4.6 PATCH, EXTENDED RELEASE TRANSDERMAL at 19:48

## 2024-02-23 RX ADMIN — ENTACAPONE 200 MILLIGRAM(S): 200 TABLET, FILM COATED ORAL at 11:53

## 2024-02-23 RX ADMIN — ENTACAPONE 200 MILLIGRAM(S): 200 TABLET, FILM COATED ORAL at 06:59

## 2024-02-23 RX ADMIN — AMLODIPINE BESYLATE 5 MILLIGRAM(S): 2.5 TABLET ORAL at 06:59

## 2024-02-23 RX ADMIN — LEVETIRACETAM 500 MILLIGRAM(S): 250 TABLET, FILM COATED ORAL at 17:00

## 2024-02-23 RX ADMIN — CARVEDILOL PHOSPHATE 3.12 MILLIGRAM(S): 80 CAPSULE, EXTENDED RELEASE ORAL at 17:00

## 2024-02-23 RX ADMIN — POLYETHYLENE GLYCOL 3350 17 GRAM(S): 17 POWDER, FOR SOLUTION ORAL at 07:00

## 2024-02-23 RX ADMIN — CARBIDOPA AND LEVODOPA 1 TABLET(S): 25; 100 TABLET ORAL at 21:56

## 2024-02-23 RX ADMIN — Medication 6 MILLIGRAM(S): at 21:56

## 2024-02-23 RX ADMIN — CARBIDOPA AND LEVODOPA 2 TABLET(S): 25; 100 TABLET ORAL at 11:46

## 2024-02-23 RX ADMIN — Medication 200 MILLIGRAM(S): at 11:53

## 2024-02-23 RX ADMIN — LACTULOSE 20 GRAM(S): 10 SOLUTION ORAL at 11:53

## 2024-02-23 RX ADMIN — CARBIDOPA AND LEVODOPA 2 TABLET(S): 25; 100 TABLET ORAL at 06:59

## 2024-02-23 RX ADMIN — CARVEDILOL PHOSPHATE 3.12 MILLIGRAM(S): 80 CAPSULE, EXTENDED RELEASE ORAL at 06:59

## 2024-02-23 RX ADMIN — ENTACAPONE 200 MILLIGRAM(S): 200 TABLET, FILM COATED ORAL at 17:00

## 2024-02-23 RX ADMIN — Medication 81 MILLIGRAM(S): at 11:53

## 2024-02-23 RX ADMIN — ENOXAPARIN SODIUM 40 MILLIGRAM(S): 100 INJECTION SUBCUTANEOUS at 17:33

## 2024-02-23 RX ADMIN — Medication 100 MILLIGRAM(S): at 21:56

## 2024-02-23 RX ADMIN — CARBIDOPA AND LEVODOPA 2 TABLET(S): 25; 100 TABLET ORAL at 17:00

## 2024-02-23 RX ADMIN — RIVASTIGMINE 1 PATCH: 4.6 PATCH, EXTENDED RELEASE TRANSDERMAL at 17:34

## 2024-02-23 RX ADMIN — RIVASTIGMINE 1 PATCH: 4.6 PATCH, EXTENDED RELEASE TRANSDERMAL at 07:00

## 2024-02-23 RX ADMIN — LEVETIRACETAM 500 MILLIGRAM(S): 250 TABLET, FILM COATED ORAL at 07:00

## 2024-02-23 RX ADMIN — GABAPENTIN 100 MILLIGRAM(S): 400 CAPSULE ORAL at 21:56

## 2024-02-23 NOTE — PROGRESS NOTE ADULT - SUBJECTIVE AND OBJECTIVE BOX
Patient is a 73y old  Male who presents with a chief complaint of Parkinson's Disease (22 Feb 2024 12:21)      Patient seen and examined at bedside. feels better today. no acute medical complaints.       ALLERGIES:  penicillin (Unknown)  sulfa drugs (Unknown)  cefaclor (Unknown)    MEDICATIONS  (STANDING):  allopurinol 200 milliGRAM(s) Oral daily  amLODIPine   Tablet 5 milliGRAM(s) Oral daily  aspirin  chewable 81 milliGRAM(s) Oral daily  carbidopa/levodopa  25/100 2 Tablet(s) Oral <User Schedule>  carbidopa/levodopa CR 50/200 1 Tablet(s) Oral <User Schedule>  carvedilol 3.125 milliGRAM(s) Oral every 12 hours  enoxaparin Injectable 40 milliGRAM(s) SubCutaneous every 24 hours  entacapone 200 milliGRAM(s) Oral <User Schedule>  gabapentin 100 milliGRAM(s) Oral at bedtime  lactulose Syrup 20 Gram(s) Oral daily  levETIRAcetam 500 milliGRAM(s) Oral two times a day  lidocaine   4% Patch 1 Patch Transdermal daily  melatonin 6 milliGRAM(s) Oral at bedtime  polyethylene glycol 3350 17 Gram(s) Oral every 12 hours  rivastigmine patch  9.5 mG/24 Hr(s). 1 Patch Transdermal every 24 hours  senna 2 Tablet(s) Oral at bedtime  traZODone 100 milliGRAM(s) Oral at bedtime    MEDICATIONS  (PRN):  acetaminophen     Tablet .. 650 milliGRAM(s) Oral every 6 hours PRN Mild Pain (1 - 3)    Vital Signs Last 24 Hrs  T(F): 97.7 (23 Feb 2024 08:19), Max: 97.9 (22 Feb 2024 22:00)  HR: 83 (23 Feb 2024 08:19) (83 - 94)  BP: 131/81 (23 Feb 2024 08:19) (119/76 - 131/81)  RR: 16 (23 Feb 2024 08:19) (16 - 16)  SpO2: 95% (23 Feb 2024 08:19) (94% - 95%)  I&O's Summary    22 Feb 2024 07:01  -  23 Feb 2024 07:00  --------------------------------------------------------  IN: 0 mL / OUT: 700 mL / NET: -700 mL          PHYSICAL EXAM:  General: NAD, A/O x 3  ENT: MMM, no scleral icterus  Neck: Supple, No JVD  Lungs: Clear to auscultation bilaterally, no wheezes, rales, rhonchi  Cardio: RRR, S1/S2  Abdomen: Soft, Nontender, Nondistended; Bowel sounds present  Extremities: No calf tenderness, No pitting edema    LABS:                        13.6   7.87  )-----------( 138      ( 22 Feb 2024 07:39 )             40.4       02-22    143  |  107  |  25  ----------------------------<  91  4.8   |  27  |  1.01    Ca    8.7      22 Feb 2024 07:39    TPro  6.2  /  Alb  2.5  /  TBili  0.8  /  DBili  x   /  AST  14  /  ALT  10  /  AlkPhos  66  02-22                02-02 Chol 144 mg/dL LDL -- HDL 46 mg/dL Trig 93 mg/dL                  Urinalysis Basic - ( 22 Feb 2024 07:39 )    Color: x / Appearance: x / SG: x / pH: x  Gluc: 91 mg/dL / Ketone: x  / Bili: x / Urobili: x   Blood: x / Protein: x / Nitrite: x   Leuk Esterase: x / RBC: x / WBC x   Sq Epi: x / Non Sq Epi: x / Bacteria: x            RADIOLOGY & ADDITIONAL TESTS:    Care Discussed with Consultants/Other Providers: yes, rehab

## 2024-02-23 NOTE — PROGRESS NOTE ADULT - ASSESSMENT
ASSESSMENT/PLAN  74 y/o male with PMH of Parkinson's disease (diagnosed 7 years ago), HTN, 2 x CVA (1998, 2016), seizure (2019 - on keppra), presented to [facility] ED on 1/31 after 2 falls. Fall w/u negative - attribute fall to PD regimen.    #Parkinson's Disease now with gait Instability, ADL impairments and Functional impairments  - Continue Comprehensive Rehab Program of PT/OT/SLP    ------------------------------------------------------  Parkinson's related motor symptoms    #Dyskinesia/Rigidity/Bradykinesia  - Sinemet 25/100 2 tabs 7am, 12pm, 5pm  - Entacapone 200 1 tab at 7am, 12pm and 5pm   - Sinemet ER 50/200 1 tab HS  - Rivastigmine 9.5mg transdermal   - Movement disorders following    --------------------------------------------------------  Parkinson's related non-motor symptoms    #Orthostatic hypotension  -Monitor OH vital signs  -Currently on carvedilol and norvasc - re-evaluate continued need    #Dizziness may be OH related  - Meclizine 12.5 BID PRN - discontinued meclizine due to sedation 2/13 - tolerating the change well  - Dizziness improving, no significant orthostasis     #Sleep/Mood  - Trazodone 100 at bedtime   - Melatonin 6mg HS  - Stop Ambien as may be contributing to dizziness 2/13 - tolerating the change well  - Recreation Therapy  - Neuropsych consult (2/19/24): 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 Outpatient neuropsychological testing, individual supportive psychotherapy, c/w rivastigmine, c/w cognitive remediation through SLT and OT.     #Pain control  - Tylenol PRN, Lidocaine patch   - Muscle relaxant: Methocarbamol 750 TID PRN - stopped due to dizziness   - Continue Gabapentin 100mg at bedtime for LBP 2/13 - pain improvement noted     #GI/Bowel Management/Constipation  - Senna at bedtime, Miralax QD  - Lactulose PRN  - Fleet enema prn for constipation     #Bladder management  - no significant PVR, d/c monitor    ----------------------------------------------------------  Concurrent medical problems    #Prior CVA  - ASA 81  - previously on atorvastatin 40 (now off since 1/2023) - LDL 80    #Seizure  - Keppra 500 BID    #HTN  - Amlodipine 5mg daily   - Carvedilol 3.125 BID  - No significant OH at present     #history of Diabetes per chart  - A1C 5.9  - FS has been <150  - Continue monitoring glucose via lab    #Gout  - Allopurinol 200  - Left foot X-ray (2/23) no acute pathology consider MRI for further eval    #COVID positive (1/31) - completed isolation  - completed RDV x 5 Day    #Incidental finds:  - 1.8 cm left apical lung mass, suspicious for malignancy-new from neck CT of 6/1/2023. ****Outpatient pulmonology follow-up for PET/CT and PFTs  - 2 rounded foci in the transverse colon. Differential includes adherent stool versus colonic lesions.****outpatient colonoscopy with Dr. Nelson.    #DVT Prophylaxis  - Lovenox  - TEDs     #Skin:  -No active issues at this time    FEN   - Diet - Regular + Thins  - Dysphagia  SLP - evaluation and treatment    Precautions / PROPHYLAXIS:   - Falls, Cardiac, Seizure   - ortho: Weight bearing status: WBAT   - Lungs: Aspiration  - Pressure injury/Skin:  OOB to Chair, PT/OT

## 2024-02-23 NOTE — PROGRESS NOTE ADULT - ASSESSMENT
74 y/o M with PMH of Parkinson's disease (diagnosed 7 years ago), HTN, 2 x CVA (1998, 2016), seizure (2019 - on keppra), presented to [facility] ED on 1/31 after 2 falls. Fall w/u negative - fall attributed to PD regimen. Hospital course complicated by COVID positive on admission completed RDV x 5 Day.  Imaging with Incidental finding of 1.8 cm left apical lung mass, suspicious for malignancy-new from neck CT of 6/1/2023 and 2 rounded foci in the transverse colon. Differential includes adherent stool versus colonic lesions. Will require outpatient colonoscopy with Dr. Nelson. Patient was evaluated by PM&R and therapy for functional deficits and gait/ADL impairments and recommend acute rehabilitation.  Patient was medically optimized for discharge to [facility] on 2/12/2023    #Parkinson's related motor symptoms  #Dyskinesia/Rigidity/Bradykinesia  -Sinemet, Entacapone, rivastigmine per neuro, movement disorder specialist   -Fall precaution   -Pain control and bowel regimen per rehab team     #Orthostatic hypotension  -Monitor OH vital signs  -TEDS and abd binders   -Continue coreg, norvasc with holding parameters    #Dizziness - improved   -Meclizine held for sedation    #Prior CVA  -Continue ASA 81  -Previously on atorvastatin 40mg (now off since 1/2023) - LDL 80  -Follow up with PMD to repeat lipid panel and resume atorvastatin as indicated     #Seizure  -Continue Keppra   -Seizure precaution    #HTN  -Amlodipine 5mg daily (reduced dose) and coreg 3.125mg BID   -Monitor BP   -Reevaluate need due to possible symptomatic OH    #History of Diabetes per chart  -HbA1C 5.9  -Monitor glucose via routine lab    #Gout  -Allopurinol 200    #COVID positive (1/31) - completed isolation  -Completed RDV x 5 Day  -Repeat COVID PCR positive 2/12   -Supportive care prn    #Incidental Findings  -1.8 cm left apical lung mass, suspicious for malignancy-new from neck CT of 6/1/2023. ****Outpatient pulmonology follow-up for PET/CT and PFTs  -2 rounded foci in the transverse colon. Differential includes adherent stool versus colonic lesions.****outpatient colonoscopy with Dr. Nelson    #DVT ppx - lovenox

## 2024-02-23 NOTE — PROGRESS NOTE ADULT - SUBJECTIVE AND OBJECTIVE BOX
SUBJECTIVE/ROS: Patient seen at bedside and in Gym in good spirits. Pt offers no complaints overnight. Witnessed pt walking in room unassisted. Pt reports no significant orthostasis but reported very mild dizziness, encouraged to increase water intake. Pt encouraged to call for assistance when ambulating.  He denies chest pain, fever, chills, nausea, vomiting, abdominal pain, headache, or BLE pain.     HPI:  72 y/o male with PMH of Parkinson's disease (diagnosed 7 years ago), HTN, 2 x CVA (1998, 2016, minimal residual), seizure (2019 - on keppra), presented to [facility] ED on 1/31 after 2 falls. (of note, patient was admitted for same complaints in 09/22/2022). Trauma w/u was negative for acute traumatic injury. (CT chest, CT abdomen/pelvis, CT c-spine, CT head, xray pelvis, xray chest). Further workup for fall negative. Neurology evaluated the patient and feels that he is having morning freeze episodes secondary to wearing off of sinemet because of lack of long-acting version at bedtime; Sinemet regimen changed with improvement (sinemet 25/100 2 tab at 7am, 12 pm and 5pm; sinemet ER 50/200 1 tab HS (10pm)).     Hospital course complicated by:  - COVID positive on admission  - completed RDV x 5 Day.    - Incidental find of 1.8 cm left apical lung mass, suspicious for malignancy-new from neck CT of 6/1/2023. Will require outpatient pulmonology follow-up for PET/CT and PFTs  - CT abdomen showed: 2 rounded foci in the transverse colon. Differential includes adherent stool versus colonic lesions. Will require outpatient colonoscopy with Dr. Nelson.    Patient was evaluated by PM&R and therapy for functional deficits and gait/ADL impairments and recommend acute rehabilitation.  Patient was medically optimized for discharge to [facility] on 2/12/2023      LABS:                          13.6   7.87  )-----------( 138      ( 22 Feb 2024 07:39 )             40.4     02-22    143  |  107  |  25<H>  ----------------------------<  91  4.8   |  27  |  1.01    Ca    8.7      22 Feb 2024 07:39    TPro  6.2  /  Alb  2.5<L>  /  TBili  0.8  /  DBili  x   /  AST  14  /  ALT  10  /  AlkPhos  66  02-22    LIVER FUNCTIONS - ( 22 Feb 2024 07:39 )  Alb: 2.5 g/dL / Pro: 6.2 g/dL / ALK PHOS: 66 U/L / ALT: 10 U/L / AST: 14 U/L / GGT: x             Vital Signs Last 24 Hrs  T(C): 36.5 (23 Feb 2024 08:19), Max: 36.6 (22 Feb 2024 22:00)  T(F): 97.7 (23 Feb 2024 08:19), Max: 97.9 (22 Feb 2024 22:00)  HR: 83 (23 Feb 2024 08:19) (83 - 94)  BP: 131/81 (23 Feb 2024 08:19) (119/76 - 131/81)  BP(mean): --  RR: 16 (23 Feb 2024 08:19) (16 - 16)  SpO2: 95% (23 Feb 2024 08:19) (94% - 95%)    Parameters below as of 23 Feb 2024 08:19  Patient On (Oxygen Delivery Method): room air      PHYSICAL EXAM  Constitutional - NAD, Comfortable  HEENT - NCAT, EOMI  Neck - Supple, No limited ROM  Chest - Breathing comfortably   Extremities - No C/C/E, TEDs in place  Neurologic Exam - alert, oriented, hypophonia, slight tremor of the left > right hand, mild bradykinesia worse on the left side.       MEDICATIONS  (STANDING):  allopurinol 200 milliGRAM(s) Oral daily  amLODIPine   Tablet 5 milliGRAM(s) Oral daily  aspirin  chewable 81 milliGRAM(s) Oral daily  carbidopa/levodopa  25/100 2 Tablet(s) Oral <User Schedule>  carbidopa/levodopa CR 50/200 1 Tablet(s) Oral <User Schedule>  carvedilol 3.125 milliGRAM(s) Oral every 12 hours  enoxaparin Injectable 40 milliGRAM(s) SubCutaneous every 24 hours  entacapone 200 milliGRAM(s) Oral <User Schedule>  gabapentin 100 milliGRAM(s) Oral at bedtime  lactulose Syrup 20 Gram(s) Oral daily  levETIRAcetam 500 milliGRAM(s) Oral two times a day  lidocaine   4% Patch 1 Patch Transdermal daily  melatonin 6 milliGRAM(s) Oral at bedtime  polyethylene glycol 3350 17 Gram(s) Oral every 12 hours  rivastigmine patch  9.5 mG/24 Hr(s). 1 Patch Transdermal every 24 hours  senna 2 Tablet(s) Oral at bedtime  traZODone 100 milliGRAM(s) Oral at bedtime    MEDICATIONS  (PRN):  acetaminophen     Tablet .. 650 milliGRAM(s) Oral every 6 hours PRN Mild Pain (1 - 3)

## 2024-02-24 PROCEDURE — 99232 SBSQ HOSP IP/OBS MODERATE 35: CPT

## 2024-02-24 RX ADMIN — CARVEDILOL PHOSPHATE 3.12 MILLIGRAM(S): 80 CAPSULE, EXTENDED RELEASE ORAL at 07:00

## 2024-02-24 RX ADMIN — ENTACAPONE 200 MILLIGRAM(S): 200 TABLET, FILM COATED ORAL at 11:45

## 2024-02-24 RX ADMIN — AMLODIPINE BESYLATE 5 MILLIGRAM(S): 2.5 TABLET ORAL at 07:00

## 2024-02-24 RX ADMIN — CARVEDILOL PHOSPHATE 3.12 MILLIGRAM(S): 80 CAPSULE, EXTENDED RELEASE ORAL at 17:00

## 2024-02-24 RX ADMIN — Medication 200 MILLIGRAM(S): at 11:45

## 2024-02-24 RX ADMIN — GABAPENTIN 100 MILLIGRAM(S): 400 CAPSULE ORAL at 21:54

## 2024-02-24 RX ADMIN — ENOXAPARIN SODIUM 40 MILLIGRAM(S): 100 INJECTION SUBCUTANEOUS at 17:42

## 2024-02-24 RX ADMIN — ENTACAPONE 200 MILLIGRAM(S): 200 TABLET, FILM COATED ORAL at 17:01

## 2024-02-24 RX ADMIN — RIVASTIGMINE 1 PATCH: 4.6 PATCH, EXTENDED RELEASE TRANSDERMAL at 07:00

## 2024-02-24 RX ADMIN — CARBIDOPA AND LEVODOPA 1 TABLET(S): 25; 100 TABLET ORAL at 21:54

## 2024-02-24 RX ADMIN — RIVASTIGMINE 1 PATCH: 4.6 PATCH, EXTENDED RELEASE TRANSDERMAL at 17:41

## 2024-02-24 RX ADMIN — POLYETHYLENE GLYCOL 3350 17 GRAM(S): 17 POWDER, FOR SOLUTION ORAL at 07:00

## 2024-02-24 RX ADMIN — LEVETIRACETAM 500 MILLIGRAM(S): 250 TABLET, FILM COATED ORAL at 07:00

## 2024-02-24 RX ADMIN — CARBIDOPA AND LEVODOPA 2 TABLET(S): 25; 100 TABLET ORAL at 07:00

## 2024-02-24 RX ADMIN — Medication 100 MILLIGRAM(S): at 21:59

## 2024-02-24 RX ADMIN — ENTACAPONE 200 MILLIGRAM(S): 200 TABLET, FILM COATED ORAL at 07:00

## 2024-02-24 RX ADMIN — Medication 81 MILLIGRAM(S): at 11:45

## 2024-02-24 RX ADMIN — CARBIDOPA AND LEVODOPA 2 TABLET(S): 25; 100 TABLET ORAL at 11:45

## 2024-02-24 RX ADMIN — CARBIDOPA AND LEVODOPA 2 TABLET(S): 25; 100 TABLET ORAL at 17:01

## 2024-02-24 RX ADMIN — LACTULOSE 20 GRAM(S): 10 SOLUTION ORAL at 11:45

## 2024-02-24 RX ADMIN — Medication 6 MILLIGRAM(S): at 21:54

## 2024-02-24 RX ADMIN — LEVETIRACETAM 500 MILLIGRAM(S): 250 TABLET, FILM COATED ORAL at 17:01

## 2024-02-24 NOTE — PROGRESS NOTE ADULT - SUBJECTIVE AND OBJECTIVE BOX
Subjective   "Nehemias" is a 72 y/o male with PMH of Parkinson's disease (diagnosed 7 years ago), HTN, 2 x CVA (1998, 2016, minimal residual), seizure (2019 - on keppra), presented to [facility] ED on 1/31 after 2 falls. (of note, patient was admitted for same complaints in 09/22/2022). Trauma w/u was negative for acute traumatic injury. (CT chest, CT abdomen/pelvis, CT c-spine, CT head, xray pelvis, xray chest). Further workup for fall negative.     So far Neurology evaluated the patient and feels that he is having morning freeze episodes secondary to wearing off of sinemet because of lack of long-acting version at bedtime; Sinemet regimen changed with improvement (sinemet 25/100 2 tab at 7am, 12 pm and 5pm; sinemet ER 50/200 1 tab HS (10pm)).     Hospital course complicated by:  - COVID positive on admission  - completed RDV x 5 Day.    - Incidental find of 1.8 cm left apical lung mass, suspicious for malignancy-new from neck CT of 6/1/2023. Will require outpatient pulmonology follow-up for PET/CT and PFTs  - CT abdomen showed: 2 rounded foci in the transverse colon. Differential includes adherent stool versus colonic lesions. Will require outpatient colonoscopy with Dr. Nelson.     Patient was medically optimized for discharge to [facility] on 2/12/2023 (12 Feb 2024 13:46)    Interval history: Today as oncall covering physician, I spoke to this patient who was very pleasant and pt has had issues with sleeping but it seems to be chronic issues even before hospitalization.  pt had BM yesterday.    PAST MEDICAL & SURGICAL HISTORY:  Parkinson disease      CVA (cerebral vascular accident)      HTN (hypertension)      Diabetes mellitus      Gout      History of appendectomy          Allergies    penicillin (Unknown)  sulfa drugs (Unknown)  cefaclor (Unknown)    Intolerances          PHYSICAL EXAM    VITALS  73y  Vital Signs Last 24 Hrs  T(C): 36.6 (24 Feb 2024 08:35), Max: 36.6 (23 Feb 2024 21:59)  T(F): 97.9 (24 Feb 2024 08:35), Max: 97.9 (23 Feb 2024 21:59)  HR: 88 (24 Feb 2024 08:35) (77 - 96)  BP: 130/84 (24 Feb 2024 08:35) (121/73 - 137/88)  BP(mean): --  RR: 16 (24 Feb 2024 08:35) (16 - 16)  SpO2: 95% (24 Feb 2024 08:35) (95% - 96%)  Constitutional - NAD, Comfortable  HEENT - NCAT, EOMI  Neck - Supple, No limited ROM  Chest - Breathing comfortably   Extremities - No C/C/E, TEDs in place  Neurologic Exam - alert, oriented, hypophonia, slight tremor of the left > right hand, mild bradykinesia worse on the left side        MEDICATIONS  (STANDING):  allopurinol 200 milliGRAM(s) Oral daily  amLODIPine   Tablet 5 milliGRAM(s) Oral daily  aspirin  chewable 81 milliGRAM(s) Oral daily  carbidopa/levodopa  25/100 2 Tablet(s) Oral <User Schedule>  carbidopa/levodopa CR 50/200 1 Tablet(s) Oral <User Schedule>  carvedilol 3.125 milliGRAM(s) Oral every 12 hours  enoxaparin Injectable 40 milliGRAM(s) SubCutaneous every 24 hours  entacapone 200 milliGRAM(s) Oral <User Schedule>  gabapentin 100 milliGRAM(s) Oral at bedtime  lactulose Syrup 20 Gram(s) Oral daily  levETIRAcetam 500 milliGRAM(s) Oral two times a day  lidocaine   4% Patch 1 Patch Transdermal daily  melatonin 6 milliGRAM(s) Oral at bedtime  polyethylene glycol 3350 17 Gram(s) Oral every 12 hours  rivastigmine patch  9.5 mG/24 Hr(s). 1 Patch Transdermal every 24 hours  senna 2 Tablet(s) Oral at bedtime  traZODone 100 milliGRAM(s) Oral at bedtime    MEDICATIONS  (PRN):  acetaminophen     Tablet .. 650 milliGRAM(s) Oral every 6 hours PRN Mild Pain (1 - 3)  saline laxative (FLEET) Rectal Enema 1 Enema Rectal daily PRN Constipation

## 2024-02-24 NOTE — PROGRESS NOTE ADULT - SUBJECTIVE AND OBJECTIVE BOX
Hospitalist: Tamie Bullock DO    CHIEF COMPLAINT: Patient is a 73y old  male who presents with a chief complaint of Parkinson's Disease (24 Feb 2024 11:40)      SUBJECTIVE / OVERNIGHT EVENTS: Patient seen and examined. No acute events overnight. Pain well controlled and patient without any complaints.    MEDICATIONS  (STANDING):  allopurinol 200 milliGRAM(s) Oral daily  amLODIPine   Tablet 5 milliGRAM(s) Oral daily  aspirin  chewable 81 milliGRAM(s) Oral daily  carbidopa/levodopa  25/100 2 Tablet(s) Oral <User Schedule>  carbidopa/levodopa CR 50/200 1 Tablet(s) Oral <User Schedule>  carvedilol 3.125 milliGRAM(s) Oral every 12 hours  enoxaparin Injectable 40 milliGRAM(s) SubCutaneous every 24 hours  entacapone 200 milliGRAM(s) Oral <User Schedule>  gabapentin 100 milliGRAM(s) Oral at bedtime  lactulose Syrup 20 Gram(s) Oral daily  levETIRAcetam 500 milliGRAM(s) Oral two times a day  lidocaine   4% Patch 1 Patch Transdermal daily  melatonin 6 milliGRAM(s) Oral at bedtime  polyethylene glycol 3350 17 Gram(s) Oral every 12 hours  rivastigmine patch  9.5 mG/24 Hr(s). 1 Patch Transdermal every 24 hours  senna 2 Tablet(s) Oral at bedtime  traZODone 100 milliGRAM(s) Oral at bedtime    MEDICATIONS  (PRN):  acetaminophen     Tablet .. 650 milliGRAM(s) Oral every 6 hours PRN Mild Pain (1 - 3)  saline laxative (FLEET) Rectal Enema 1 Enema Rectal daily PRN Constipation      VITALS:  T(F): 97.9 (02-24-24 @ 08:35), Max: 97.9 (02-23-24 @ 21:59)  HR: 88 (02-24-24 @ 08:35) (77 - 96)  BP: 130/84 (02-24-24 @ 08:35) (121/73 - 137/88)  RR: 16 (02-24-24 @ 08:35) (16 - 16)  SpO2: 95% (02-24-24 @ 08:35)      REVIEW OF SYSTEMS:  For ROV please refer back to H&P     PHYSICAL EXAM:  General: NAD, A/O x 3  ENT: MMM, no scleral icterus  Neck: Supple, No JVD  Lungs: Clear to auscultation bilaterally, no wheezes, rales, rhonchi  Cardio: RRR, S1/S2  Abdomen: Soft, Nontender, Nondistended; Bowel sounds present  Extremities: No calf tenderness, No pitting edema      RADIOLOGY & ADDITIONAL TESTS:    Imaging Personally Reviewed:    [X] Consultant(s) Notes Reviewed:  [X] Care Discussed with Consultants/Other Providers:

## 2024-02-24 NOTE — PROGRESS NOTE ADULT - ASSESSMENT
74 y/o M with PMH of Parkinson's disease (diagnosed 7 years ago), HTN, 2 x CVA (1998, 2016), seizure (2019 - on keppra), presented to [facility] ED on 1/31 after 2 falls. Fall w/u negative - fall attributed to PD regimen. Hospital course complicated by COVID positive on admission completed RDV x 5 Day.  Imaging with Incidental finding of 1.8 cm left apical lung mass, suspicious for malignancy-new from neck CT of 6/1/2023 and 2 rounded foci in the transverse colon. Differential includes adherent stool versus colonic lesions. Will require outpatient colonoscopy with Dr. Nelson. Patient was evaluated by PM&R and therapy for functional deficits and gait/ADL impairments and recommend acute rehabilitation.  Patient was medically optimized for discharge to [facility] on 2/12/2023    #Parkinson's related motor symptoms  #Dyskinesia/Rigidity/Bradykinesia  -Sinemet, Entacapone, rivastigmine per neuro, movement disorder specialist   -Fall precaution   -Pain control and bowel regimen per rehab team     #Orthostatic hypotension  -Monitor OH vital signs  -TEDS and abd binders   -Continue coreg, norvasc with holding parameters    #Dizziness - improved   -Meclizine held for sedation    #Prior CVA  -Continue ASA 81  -Previously on atorvastatin 40mg (now off since 1/2023) - LDL 80  -Follow up with PMD to repeat lipid panel and resume atorvastatin as indicated     #Seizure  -Continue Keppra   -Seizure precaution    #HTN  -Amlodipine 5mg daily (reduced dose) and coreg 3.125mg BID   -Monitor BP   -Reevaluate need due to possible symptomatic OH    #History of Diabetes per chart  -HbA1C 5.9  -Monitor glucose via routine lab    #Gout  -Allopurinol 200    #COVID positive (1/31) - completed isolation  -Completed RDV x 5 Day  -Repeat COVID PCR positive 2/12   -Supportive care prn    #Incidental Findings  -1.8 cm left apical lung mass, suspicious for malignancy-new from neck CT of 6/1/2023. ****Outpatient pulmonology follow-up for PET/CT and PFTs  -2 rounded foci in the transverse colon. Differential includes adherent stool versus colonic lesions.****outpatient colonoscopy with Dr. Nelson    #DVT ppx - lovenox

## 2024-02-24 NOTE — PROGRESS NOTE ADULT - ASSESSMENT
72 y/o male with PMH of Parkinson's disease (diagnosed 7 years ago), HTN, 2 x CVA (1998, 2016), seizure (2019 - on keppra), presented to [facility] ED on 1/31 after 2 falls. Fall w/u negative - attribute fall to PD regimen.  I spoke to patient that we are also trying to help with sleep patterns.  inpatient rehab has shown that it can improve sleeping patterns for patients with PD.     #Dyskinesia/Rigidity/Bradykinesia  - Sinemet 25/100 2 tabs 7am, 12pm, 5pm  - Entacapone 200 1 tab at 7am, 12pm and 5pm   - Sinemet ER 50/200 1 tab HS  - Rivastigmine 9.5mg transdermal   - Movement disorders following    --------------------------------------------------------  Parkinson's related non-motor symptoms    #Orthostatic hypotension  -Monitor OH vital signs  -Currently on carvedilol and norvasc - re-evaluate continued need  did not have orthostatic hypo yesterday    #Dizziness may be OH related  - Meclizine 12.5 BID PRN - discontinued meclizine due to sedation 2/13 - tolerating the change well  - Dizziness improving, no significant orthostasis     #Sleep/Mood  - Trazodone 100 at bedtime   - Melatonin 6mg HS  - Stop Ambien as may be contributing to dizziness 2/13 - tolerating the change well  - Recreation Therapy  - Neuropsych consult (2/19/24): 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 Outpatient neuropsychological testing, individual supportive psychotherapy, c/w rivastigmine, c/w cognitive remediation through SLT and OT.     #Pain control  - Tylenol PRN, Lidocaine patch   - Muscle relaxant: Methocarbamol 750 TID PRN - stopped due to dizziness   - Continue Gabapentin 100mg at bedtime for LBP 2/13 - pain improvement noted     #GI/Bowel Management/Constipation  - Senna at bedtime, Miralax QD  - Lactulose PRN  - Fleet enema prn for constipation     #Bladder management  - no significant PVR, d/c monitor    ----------------------------------------------------------  Concurrent medical problems    #Prior CVA  - ASA 81  - previously on atorvastatin 40 (now off since 1/2023) - LDL 80    #Seizure  - Keppra 500 BID    #HTN  - Amlodipine 5mg daily   - Carvedilol 3.125 BID  - No significant OH at present     #history of Diabetes per chart  - A1C 5.9  - FS has been <150  - Continue monitoring glucose via lab    #Gout  - Allopurinol 200  - Left foot X-ray (2/23) no acute pathology consider MRI for further eval

## 2024-02-25 PROCEDURE — 99232 SBSQ HOSP IP/OBS MODERATE 35: CPT

## 2024-02-25 RX ADMIN — ENTACAPONE 200 MILLIGRAM(S): 200 TABLET, FILM COATED ORAL at 17:20

## 2024-02-25 RX ADMIN — CARBIDOPA AND LEVODOPA 2 TABLET(S): 25; 100 TABLET ORAL at 06:57

## 2024-02-25 RX ADMIN — CARVEDILOL PHOSPHATE 3.12 MILLIGRAM(S): 80 CAPSULE, EXTENDED RELEASE ORAL at 06:57

## 2024-02-25 RX ADMIN — CARBIDOPA AND LEVODOPA 1 TABLET(S): 25; 100 TABLET ORAL at 22:25

## 2024-02-25 RX ADMIN — ENOXAPARIN SODIUM 40 MILLIGRAM(S): 100 INJECTION SUBCUTANEOUS at 17:19

## 2024-02-25 RX ADMIN — LIDOCAINE 1 PATCH: 4 CREAM TOPICAL at 20:13

## 2024-02-25 RX ADMIN — CARVEDILOL PHOSPHATE 3.12 MILLIGRAM(S): 80 CAPSULE, EXTENDED RELEASE ORAL at 17:19

## 2024-02-25 RX ADMIN — Medication 6 MILLIGRAM(S): at 22:25

## 2024-02-25 RX ADMIN — ENTACAPONE 200 MILLIGRAM(S): 200 TABLET, FILM COATED ORAL at 06:57

## 2024-02-25 RX ADMIN — POLYETHYLENE GLYCOL 3350 17 GRAM(S): 17 POWDER, FOR SOLUTION ORAL at 17:20

## 2024-02-25 RX ADMIN — RIVASTIGMINE 1 PATCH: 4.6 PATCH, EXTENDED RELEASE TRANSDERMAL at 17:21

## 2024-02-25 RX ADMIN — POLYETHYLENE GLYCOL 3350 17 GRAM(S): 17 POWDER, FOR SOLUTION ORAL at 06:57

## 2024-02-25 RX ADMIN — LIDOCAINE 1 PATCH: 4 CREAM TOPICAL at 12:11

## 2024-02-25 RX ADMIN — CARBIDOPA AND LEVODOPA 2 TABLET(S): 25; 100 TABLET ORAL at 12:09

## 2024-02-25 RX ADMIN — RIVASTIGMINE 1 PATCH: 4.6 PATCH, EXTENDED RELEASE TRANSDERMAL at 17:23

## 2024-02-25 RX ADMIN — GABAPENTIN 100 MILLIGRAM(S): 400 CAPSULE ORAL at 22:25

## 2024-02-25 RX ADMIN — RIVASTIGMINE 1 PATCH: 4.6 PATCH, EXTENDED RELEASE TRANSDERMAL at 07:00

## 2024-02-25 RX ADMIN — LEVETIRACETAM 500 MILLIGRAM(S): 250 TABLET, FILM COATED ORAL at 17:20

## 2024-02-25 RX ADMIN — ENTACAPONE 200 MILLIGRAM(S): 200 TABLET, FILM COATED ORAL at 12:10

## 2024-02-25 RX ADMIN — LEVETIRACETAM 500 MILLIGRAM(S): 250 TABLET, FILM COATED ORAL at 06:57

## 2024-02-25 RX ADMIN — Medication 100 MILLIGRAM(S): at 22:26

## 2024-02-25 RX ADMIN — AMLODIPINE BESYLATE 5 MILLIGRAM(S): 2.5 TABLET ORAL at 06:57

## 2024-02-25 RX ADMIN — Medication 200 MILLIGRAM(S): at 12:10

## 2024-02-25 RX ADMIN — Medication 81 MILLIGRAM(S): at 12:09

## 2024-02-25 RX ADMIN — RIVASTIGMINE 1 PATCH: 4.6 PATCH, EXTENDED RELEASE TRANSDERMAL at 20:13

## 2024-02-25 RX ADMIN — CARBIDOPA AND LEVODOPA 2 TABLET(S): 25; 100 TABLET ORAL at 17:18

## 2024-02-25 RX ADMIN — LIDOCAINE 1 PATCH: 4 CREAM TOPICAL at 22:26

## 2024-02-25 NOTE — PROGRESS NOTE ADULT - ASSESSMENT
Goal Outcome Evaluation:                 Pt A&Ox4, up ad maribell. Voiding per PW d/t frequency and EEG continuous monitoring. On RA saturating in 90s, tele NSR. Medications given per orders, PRN Benadryl given with positive effect. K replacement given, lab draw in AM to monitor improvement. IV to RAC running NS@50. Safety maintained, call light within reach.                               72 y/o male with PMH of Parkinson's disease (diagnosed 7 years ago), HTN, 2 x CVA (1998, 2016), seizure (2019 - on keppra), presented to [facility] ED on 1/31 after 2 falls. Fall w/u negative - attribute fall to PD regimen.  I spoke to patient that we are also trying to help with sleep patterns.  inpatient rehab has shown that it can improve sleeping patterns for patients with PD.   weekend management no new management pt stable    #Dyskinesia/Rigidity/Bradykinesia  - Sinemet 25/100 2 tabs 7am, 12pm, 5pm  - Entacapone 200 1 tab at 7am, 12pm and 5pm   - Sinemet ER 50/200 1 tab HS  - Rivastigmine 9.5mg transdermal   - Movement disorders following    --------------------------------------------------------  Parkinson's related non-motor symptoms    #Orthostatic hypotension  -Monitor OH vital signs  -Currently on carvedilol and norvasc - re-evaluate continued need  did not have orthostatic hypo yesterday    #Dizziness may be OH related  - Meclizine 12.5 BID PRN - discontinued meclizine due to sedation 2/13 - tolerating the change well  - Dizziness improving, no significant orthostasis     #Sleep/Mood  - Trazodone 100 at bedtime   - Melatonin 6mg HS  - Stop Ambien as may be contributing to dizziness 2/13 - tolerating the change well  - Recreation Therapy  - Neuropsych consult (2/19/24): 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 Outpatient neuropsychological testing, individual supportive psychotherapy, c/w rivastigmine, c/w cognitive remediation through SLT and OT.     #Pain control  - Tylenol PRN, Lidocaine patch   - Muscle relaxant: Methocarbamol 750 TID PRN - stopped due to dizziness   - Continue Gabapentin 100mg at bedtime for LBP 2/13 - pain improvement noted     #GI/Bowel Management/Constipation  - Senna at bedtime, Miralax QD  - Lactulose PRN  - Fleet enema prn for constipation     #Bladder management  - no significant PVR, d/c monitor    ----------------------------------------------------------  Concurrent medical problems    #Prior CVA  - ASA 81  - previously on atorvastatin 40 (now off since 1/2023) - LDL 80    #Seizure  - Keppra 500 BID    #HTN  - Amlodipine 5mg daily   - Carvedilol 3.125 BID  - No significant OH at present     #history of Diabetes per chart  - A1C 5.9  - FS has been <150  - Continue monitoring glucose via lab    #Gout  - Allopurinol 200  - Left foot X-ray (2/23) no acute pathology consider MRI for further eval

## 2024-02-25 NOTE — PROGRESS NOTE ADULT - TIME BILLING
Time spent includes direct patient care  (interview and examination of patient), discussion with other providers, support staff and/or patient's family members, review of medical records, ordering diagnostic tests and analyzing results, and documentation.
physical exam and managements
Time spent includes direct patient care  (interview and examination of patient), discussion with other providers, support staff and/or patient's family members, review of medical records, ordering diagnostic tests and analyzing results, and documentation.
Time spent includes direct patient care  (interview and examination of patient), discussion with other providers, support staff and/or patient's family members, review of medical records, ordering diagnostic tests and analyzing results, and documentation.
physical exam and history taking and management

## 2024-02-25 NOTE — PROGRESS NOTE ADULT - SUBJECTIVE AND OBJECTIVE BOX
Subjective   "Nehemias" is a 74 y/o male with PMH of Parkinson's disease (diagnosed 7 years ago), HTN, 2 x CVA (1998, 2016, minimal residual), seizure (2019 - on keppra), presented to [facility] ED on 1/31 after 2 falls. (of note, patient was admitted for same complaints in 09/22/2022). Trauma w/u was negative for acute traumatic injury. (CT chest, CT abdomen/pelvis, CT c-spine, CT head, xray pelvis, xray chest). Further workup for fall negative.     So far Neurology evaluated the patient and feels that he is having morning freeze episodes secondary to wearing off of sinemet because of lack of long-acting version at bedtime; Sinemet regimen changed with improvement (sinemet 25/100 2 tab at 7am, 12 pm and 5pm; sinemet ER 50/200 1 tab HS (10pm)).     Hospital course complicated by:  - COVID positive on admission  - completed RDV x 5 Day.    - Incidental find of 1.8 cm left apical lung mass, suspicious for malignancy-new from neck CT of 6/1/2023. Will require outpatient pulmonology follow-up for PET/CT and PFTs  - CT abdomen showed: 2 rounded foci in the transverse colon. Differential includes adherent stool versus colonic lesions. Will require outpatient colonoscopy with Dr. Nelson.     Patient was medically optimized for discharge to [facility] on 2/12/2023 (12 Feb 2024 13:46)    Interval history: no overnight issues    PAST MEDICAL & SURGICAL HISTORY:  Parkinson disease      CVA (cerebral vascular accident)      HTN (hypertension)      Diabetes mellitus      Gout      History of appendectomy          Allergies    penicillin (Unknown)  sulfa drugs (Unknown)  cefaclor (Unknown)    Intolerances          PHYSICAL EXAM    VITALS  73y  Vital Signs Last 24 Hrs  T(C): 36.9 (25 Feb 2024 08:14), Max: 36.9 (24 Feb 2024 21:57)  T(F): 98.4 (25 Feb 2024 08:14), Max: 98.5 (24 Feb 2024 21:57)  HR: 89 (25 Feb 2024 08:14) (86 - 96)  BP: 137/89 (25 Feb 2024 08:14) (134/79 - 137/89)  BP(mean): --  RR: 16 (25 Feb 2024 08:14) (16 - 16)  SpO2: 95% (25 Feb 2024 08:14) (93% - 95%)    Parameters below as of 25 Feb 2024 08:14  Patient On (Oxygen Delivery Method): room air    Constitutional - NAD, Comfortable  HEENT - NCAT, EOMI  Neck - Supple, No limited ROM  Chest - Breathing comfortably   Extremities - No C/C/E, TEDs in place  Neurologic Exam - alert, oriented, hypophonia, slight tremor of the left > right hand, mild bradykinesia worse on the left side        MEDICATIONS  (STANDING):  allopurinol 200 milliGRAM(s) Oral daily  amLODIPine   Tablet 5 milliGRAM(s) Oral daily  aspirin  chewable 81 milliGRAM(s) Oral daily  carbidopa/levodopa  25/100 2 Tablet(s) Oral <User Schedule>  carbidopa/levodopa CR 50/200 1 Tablet(s) Oral <User Schedule>  carvedilol 3.125 milliGRAM(s) Oral every 12 hours  enoxaparin Injectable 40 milliGRAM(s) SubCutaneous every 24 hours  entacapone 200 milliGRAM(s) Oral <User Schedule>  gabapentin 100 milliGRAM(s) Oral at bedtime  lactulose Syrup 20 Gram(s) Oral daily  levETIRAcetam 500 milliGRAM(s) Oral two times a day  lidocaine   4% Patch 1 Patch Transdermal daily  melatonin 6 milliGRAM(s) Oral at bedtime  polyethylene glycol 3350 17 Gram(s) Oral every 12 hours  rivastigmine patch  9.5 mG/24 Hr(s). 1 Patch Transdermal every 24 hours  senna 2 Tablet(s) Oral at bedtime  traZODone 100 milliGRAM(s) Oral at bedtime    MEDICATIONS  (PRN):  acetaminophen     Tablet .. 650 milliGRAM(s) Oral every 6 hours PRN Mild Pain (1 - 3)  saline laxative (FLEET) Rectal Enema 1 Enema Rectal daily PRN Constipation

## 2024-02-25 NOTE — PROGRESS NOTE ADULT - SUBJECTIVE AND OBJECTIVE BOX
Hospitalist: Tamie Bullock DO    CHIEF COMPLAINT: Patient is a 73y old  male who presents with a chief complaint of Parkinson's Disease (24 Feb 2024 13:08)      SUBJECTIVE / OVERNIGHT EVENTS: Patient seen and examined. No acute events overnight. Pain well controlled and patient without any complaints.    MEDICATIONS  (STANDING):  allopurinol 200 milliGRAM(s) Oral daily  amLODIPine   Tablet 5 milliGRAM(s) Oral daily  aspirin  chewable 81 milliGRAM(s) Oral daily  carbidopa/levodopa  25/100 2 Tablet(s) Oral <User Schedule>  carbidopa/levodopa CR 50/200 1 Tablet(s) Oral <User Schedule>  carvedilol 3.125 milliGRAM(s) Oral every 12 hours  enoxaparin Injectable 40 milliGRAM(s) SubCutaneous every 24 hours  entacapone 200 milliGRAM(s) Oral <User Schedule>  gabapentin 100 milliGRAM(s) Oral at bedtime  lactulose Syrup 20 Gram(s) Oral daily  levETIRAcetam 500 milliGRAM(s) Oral two times a day  lidocaine   4% Patch 1 Patch Transdermal daily  melatonin 6 milliGRAM(s) Oral at bedtime  polyethylene glycol 3350 17 Gram(s) Oral every 12 hours  rivastigmine patch  9.5 mG/24 Hr(s). 1 Patch Transdermal every 24 hours  senna 2 Tablet(s) Oral at bedtime  traZODone 100 milliGRAM(s) Oral at bedtime    MEDICATIONS  (PRN):  acetaminophen     Tablet .. 650 milliGRAM(s) Oral every 6 hours PRN Mild Pain (1 - 3)  saline laxative (FLEET) Rectal Enema 1 Enema Rectal daily PRN Constipation      VITALS:  T(F): 98.4 (02-25-24 @ 08:14), Max: 98.5 (02-24-24 @ 21:57)  HR: 89 (02-25-24 @ 08:14) (86 - 96)  BP: 137/89 (02-25-24 @ 08:14) (134/79 - 137/89)  RR: 16 (02-25-24 @ 08:14) (16 - 16)  SpO2: 95% (02-25-24 @ 08:14)      REVIEW OF SYSTEMS:  For ROV please refer back to H&P     PHYSICAL EXAM:  General: NAD, A/O x 3  ENT: MMM, no scleral icterus  Neck: Supple, No JVD  Lungs: Clear to auscultation bilaterally, no wheezes, rales, rhonchi  Cardio: RRR, S1/S2  Abdomen: Soft, Nontender, Nondistended; Bowel sounds present  Extremities: No calf tenderness, No pitting edema      RADIOLOGY & ADDITIONAL TESTS:    Imaging Personally Reviewed:    [X] Consultant(s) Notes Reviewed:  [X] Care Discussed with Consultants/Other Providers:

## 2024-02-25 NOTE — PROGRESS NOTE ADULT - ASSESSMENT
74 y/o M with PMH of Parkinson's disease (diagnosed 7 years ago), HTN, 2 x CVA (1998, 2016), seizure (2019 - on keppra), presented to [facility] ED on 1/31 after 2 falls. Fall w/u negative - fall attributed to PD regimen. Hospital course complicated by COVID positive on admission completed RDV x 5 Day.  Imaging with Incidental finding of 1.8 cm left apical lung mass, suspicious for malignancy-new from neck CT of 6/1/2023 and 2 rounded foci in the transverse colon. Differential includes adherent stool versus colonic lesions. Will require outpatient colonoscopy with Dr. Nelson. Patient was evaluated by PM&R and therapy for functional deficits and gait/ADL impairments and recommend acute rehabilitation.  Patient was medically optimized for discharge to [facility] on 2/12/2023    #Parkinson's related motor symptoms  #Dyskinesia/Rigidity/Bradykinesia  -Sinemet, Entacapone, rivastigmine per neuro, movement disorder specialist   -Fall precaution   -Pain control and bowel regimen per rehab team     #Orthostatic hypotension  -Monitor OH vital signs  -TEDS and abd binders   -Continue coreg, norvasc with holding parameters    #Dizziness - improved   -Meclizine held for sedation    #Prior CVA  -Continue ASA 81  -Previously on atorvastatin 40mg (now off since 1/2023) - LDL 80  -Follow up with PMD to repeat lipid panel and resume atorvastatin as indicated     #Seizure  -Continue Keppra   -Seizure precaution    #HTN  -Amlodipine 5mg daily (reduced dose) and coreg 3.125mg BID   -Monitor BP   -Reevaluate need due to possible symptomatic OH    #History of Diabetes per chart  -HbA1C 5.9  -Monitor glucose via routine lab    #Gout  -Allopurinol 200    #COVID positive (1/31) - completed isolation  -Completed RDV x 5 Day  -Repeat COVID PCR positive 2/12   -Supportive care prn    #Incidental Findings  -1.8 cm left apical lung mass, suspicious for malignancy-new from neck CT of 6/1/2023. ****Outpatient pulmonology follow-up for PET/CT and PFTs  -2 rounded foci in the transverse colon. Differential includes adherent stool versus colonic lesions.****outpatient colonoscopy with Dr. Nelson    #DVT ppx - lovenox

## 2024-02-26 ENCOUNTER — TRANSCRIPTION ENCOUNTER (OUTPATIENT)
Age: 74
End: 2024-02-26

## 2024-02-26 PROCEDURE — 99232 SBSQ HOSP IP/OBS MODERATE 35: CPT

## 2024-02-26 RX ORDER — CARBIDOPA AND LEVODOPA 25; 100 MG/1; MG/1
2 TABLET ORAL
Qty: 180 | Refills: 0
Start: 2024-02-26 | End: 2024-03-26

## 2024-02-26 RX ORDER — ALLOPURINOL 300 MG
1 TABLET ORAL
Qty: 30 | Refills: 0
Start: 2024-02-26 | End: 2024-03-26

## 2024-02-26 RX ORDER — LANOLIN ALCOHOL/MO/W.PET/CERES
2 CREAM (GRAM) TOPICAL
Qty: 60 | Refills: 0
Start: 2024-02-26 | End: 2024-03-26

## 2024-02-26 RX ORDER — MECLIZINE HCL 12.5 MG
1 TABLET ORAL
Refills: 0 | DISCHARGE

## 2024-02-26 RX ORDER — POLYETHYLENE GLYCOL 3350 17 G/17G
17 POWDER, FOR SOLUTION ORAL
Qty: 1020 | Refills: 0
Start: 2024-02-26 | End: 2024-03-26

## 2024-02-26 RX ORDER — CARVEDILOL PHOSPHATE 80 MG/1
1 CAPSULE, EXTENDED RELEASE ORAL
Qty: 60 | Refills: 0
Start: 2024-02-26 | End: 2024-03-26

## 2024-02-26 RX ORDER — GABAPENTIN 400 MG/1
1 CAPSULE ORAL
Qty: 30 | Refills: 0
Start: 2024-02-26 | End: 2024-03-26

## 2024-02-26 RX ORDER — ASPIRIN/CALCIUM CARB/MAGNESIUM 324 MG
1 TABLET ORAL
Qty: 30 | Refills: 0
Start: 2024-02-26 | End: 2024-03-26

## 2024-02-26 RX ORDER — LACTULOSE 10 G/15ML
30 SOLUTION ORAL
Qty: 900 | Refills: 0
Start: 2024-02-26 | End: 2024-03-26

## 2024-02-26 RX ORDER — SENNA PLUS 8.6 MG/1
2 TABLET ORAL
Qty: 60 | Refills: 0
Start: 2024-02-26 | End: 2024-03-26

## 2024-02-26 RX ORDER — ENTACAPONE 200 MG/1
1 TABLET, FILM COATED ORAL
Qty: 90 | Refills: 0
Start: 2024-02-26 | End: 2024-03-26

## 2024-02-26 RX ORDER — LEVETIRACETAM 250 MG/1
1 TABLET, FILM COATED ORAL
Qty: 60 | Refills: 0
Start: 2024-02-26 | End: 2024-03-26

## 2024-02-26 RX ORDER — LIDOCAINE 4 G/100G
1 CREAM TOPICAL
Qty: 0 | Refills: 0 | DISCHARGE
Start: 2024-02-26

## 2024-02-26 RX ORDER — AMLODIPINE BESYLATE 2.5 MG/1
1 TABLET ORAL
Qty: 30 | Refills: 0
Start: 2024-02-26 | End: 2024-03-26

## 2024-02-26 RX ORDER — TRAZODONE HCL 50 MG
1 TABLET ORAL
Qty: 30 | Refills: 0
Start: 2024-02-26 | End: 2024-03-26

## 2024-02-26 RX ORDER — CARBIDOPA AND LEVODOPA 25; 100 MG/1; MG/1
1 TABLET ORAL
Qty: 30 | Refills: 0
Start: 2024-02-26 | End: 2024-03-26

## 2024-02-26 RX ORDER — ACETAMINOPHEN 500 MG
2 TABLET ORAL
Qty: 0 | Refills: 0 | DISCHARGE
Start: 2024-02-26

## 2024-02-26 RX ORDER — TRAZODONE HCL 50 MG
1 TABLET ORAL
Qty: 0 | Refills: 0 | DISCHARGE

## 2024-02-26 RX ORDER — RIVASTIGMINE 4.6 MG/24H
1 PATCH, EXTENDED RELEASE TRANSDERMAL
Qty: 30 | Refills: 0
Start: 2024-02-26 | End: 2024-03-26

## 2024-02-26 RX ADMIN — GABAPENTIN 100 MILLIGRAM(S): 400 CAPSULE ORAL at 21:28

## 2024-02-26 RX ADMIN — ENTACAPONE 200 MILLIGRAM(S): 200 TABLET, FILM COATED ORAL at 07:16

## 2024-02-26 RX ADMIN — RIVASTIGMINE 1 PATCH: 4.6 PATCH, EXTENDED RELEASE TRANSDERMAL at 20:09

## 2024-02-26 RX ADMIN — CARBIDOPA AND LEVODOPA 2 TABLET(S): 25; 100 TABLET ORAL at 17:02

## 2024-02-26 RX ADMIN — CARBIDOPA AND LEVODOPA 1 TABLET(S): 25; 100 TABLET ORAL at 21:28

## 2024-02-26 RX ADMIN — SENNA PLUS 2 TABLET(S): 8.6 TABLET ORAL at 21:29

## 2024-02-26 RX ADMIN — CARVEDILOL PHOSPHATE 3.12 MILLIGRAM(S): 80 CAPSULE, EXTENDED RELEASE ORAL at 17:03

## 2024-02-26 RX ADMIN — CARBIDOPA AND LEVODOPA 2 TABLET(S): 25; 100 TABLET ORAL at 12:03

## 2024-02-26 RX ADMIN — LEVETIRACETAM 500 MILLIGRAM(S): 250 TABLET, FILM COATED ORAL at 17:03

## 2024-02-26 RX ADMIN — Medication 200 MILLIGRAM(S): at 12:02

## 2024-02-26 RX ADMIN — Medication 81 MILLIGRAM(S): at 12:01

## 2024-02-26 RX ADMIN — CARBIDOPA AND LEVODOPA 2 TABLET(S): 25; 100 TABLET ORAL at 07:17

## 2024-02-26 RX ADMIN — Medication 6 MILLIGRAM(S): at 21:28

## 2024-02-26 RX ADMIN — RIVASTIGMINE 1 PATCH: 4.6 PATCH, EXTENDED RELEASE TRANSDERMAL at 17:05

## 2024-02-26 RX ADMIN — CARVEDILOL PHOSPHATE 3.12 MILLIGRAM(S): 80 CAPSULE, EXTENDED RELEASE ORAL at 07:16

## 2024-02-26 RX ADMIN — ENOXAPARIN SODIUM 40 MILLIGRAM(S): 100 INJECTION SUBCUTANEOUS at 17:52

## 2024-02-26 RX ADMIN — ENTACAPONE 200 MILLIGRAM(S): 200 TABLET, FILM COATED ORAL at 17:03

## 2024-02-26 RX ADMIN — RIVASTIGMINE 1 PATCH: 4.6 PATCH, EXTENDED RELEASE TRANSDERMAL at 07:08

## 2024-02-26 RX ADMIN — LIDOCAINE 1 PATCH: 4 CREAM TOPICAL at 20:09

## 2024-02-26 RX ADMIN — Medication 100 MILLIGRAM(S): at 21:29

## 2024-02-26 RX ADMIN — ENTACAPONE 200 MILLIGRAM(S): 200 TABLET, FILM COATED ORAL at 12:00

## 2024-02-26 RX ADMIN — LIDOCAINE 1 PATCH: 4 CREAM TOPICAL at 12:02

## 2024-02-26 RX ADMIN — LEVETIRACETAM 500 MILLIGRAM(S): 250 TABLET, FILM COATED ORAL at 07:16

## 2024-02-26 RX ADMIN — RIVASTIGMINE 1 PATCH: 4.6 PATCH, EXTENDED RELEASE TRANSDERMAL at 17:52

## 2024-02-26 RX ADMIN — AMLODIPINE BESYLATE 5 MILLIGRAM(S): 2.5 TABLET ORAL at 07:16

## 2024-02-26 NOTE — DISCHARGE NOTE PROVIDER - NSDCMRMEDTOKEN_GEN_ALL_CORE_FT
acetaminophen 325 mg oral tablet: 2 tab(s) orally every 6 hours As needed Mild Pain (1 - 3)  allopurinol 200 mg oral tablet: 1 tab(s) orally once a day  amLODIPine 5 mg oral tablet: 1 tab(s) orally once a day  aspirin 81 mg oral tablet, chewable: 1 tab(s) orally once a day  carbidopa-levodopa 25 mg-100 mg oral tablet: 2 tab(s) orally 3 times a day at 7am, 12pm, and 5pm with entacapone  carbidopa-levodopa 50 mg-200 mg oral tablet, extended release: 1 tab(s) orally once a day (at bedtime)  carvedilol 3.125 mg oral tablet: 1 tab(s) orally every 12 hours  entacapone 200 mg oral tablet: 1 tab(s) orally 3 times a day at 7am, 12pm, and 5pm with Sinemet  gabapentin 100 mg oral capsule: 1 cap(s) orally once a day (at bedtime)  lactulose 10 g/15 mL oral syrup: 30 milliliter(s) orally once a day  levETIRAcetam 500 mg oral tablet: 1 tab(s) orally 2 times a day  lidocaine 4% topical film: Apply topically to affected area once a day to lower back  melatonin 3 mg oral tablet: 2 tab(s) orally once a day (at bedtime)  polyethylene glycol 3350 oral powder for reconstitution: 17 gram(s) orally every 12 hours  rivastigmine 9.5 mg/24 hr transdermal film, extended release: 1 patch transdermal every 24 hours  senna leaf extract oral tablet: 2 tab(s) orally once a day (at bedtime)  sodium biphosphate-sodium phosphate 19 g-7 g rectal enema: 1 each rectal once a day As needed Constipation  traZODone 100 mg oral tablet: 1 tab(s) orally once a day (at bedtime)   acetaminophen 325 mg oral tablet: 2 tab(s) orally every 6 hours as needed for Mild Pain (1 - 3)  allopurinol 200 mg oral tablet: 1 tab(s) orally once a day  amLODIPine 5 mg oral tablet: 1 tab(s) orally once a day  aspirin 81 mg oral tablet, chewable: 1 tab(s) orally once a day  carbidopa-levodopa 25 mg-100 mg oral tablet: 2 tab(s) orally 3 times a day at 7am, 12pm, and 5pm with entacapone  carbidopa-levodopa 50 mg-200 mg oral tablet, extended release: 1 tab(s) orally once a day (at bedtime)  carvedilol 3.125 mg oral tablet: 1 tab(s) orally every 12 hours  entacapone 200 mg oral tablet: 1 tab(s) orally 3 times a day at 7am, 12pm, and 5pm with Sinemet  gabapentin 100 mg oral capsule: 1 cap(s) orally once a day (at bedtime)  lactulose 10 g/15 mL oral syrup: 30 milliliter(s) orally once a day  levETIRAcetam 500 mg oral tablet: 1 tab(s) orally 2 times a day  lidocaine 4% topical film: Apply topically to affected area once a day to lower back  melatonin 3 mg oral tablet: 2 tab(s) orally once a day (at bedtime)  polyethylene glycol 3350 oral powder for reconstitution: 17 gram(s) orally every 12 hours  rivastigmine 9.5 mg/24 hr transdermal film, extended release: 1 patch transdermal every 24 hours  senna leaf extract oral tablet: 2 tab(s) orally once a day (at bedtime)  sodium biphosphate-sodium phosphate 19 g-7 g rectal enema: 1 each rectal once a day As needed Constipation  traZODone 100 mg oral tablet: 1 tab(s) orally once a day (at bedtime)

## 2024-02-26 NOTE — PROGRESS NOTE ADULT - ASSESSMENT
72 y/o M with PMH of Parkinson's disease (diagnosed 7 years ago), HTN, 2 x CVA (1998, 2016), seizure (2019 - on keppra), presented to [facility] ED on 1/31 after 2 falls. Fall w/u negative - fall attributed to PD regimen. Hospital course complicated by COVID positive on admission completed RDV x 5 Day.  Imaging with Incidental finding of 1.8 cm left apical lung mass, suspicious for malignancy-new from neck CT of 6/1/2023 and 2 rounded foci in the transverse colon. Differential includes adherent stool versus colonic lesions. Will require outpatient colonoscopy with Dr. Nelson. Patient was evaluated by PM&R and therapy for functional deficits and gait/ADL impairments and recommend acute rehabilitation.  Patient was medically optimized for discharge to [facility] on 2/12/2023    #Parkinson's related motor symptoms  #Dyskinesia/Rigidity/Bradykinesia  -Sinemet, Entacapone, rivastigmine per neuro, movement disorder specialist   -Fall precaution   -Pain control and bowel regimen per rehab team     #Orthostatic hypotension  -Monitor OH vital signs  -TEDS and abd binders   -Continue coreg, norvasc with holding parameters    #Dizziness - improved   -Meclizine held for sedation    #Prior CVA  -Continue ASA 81  -Previously on atorvastatin 40mg (now off since 1/2023) - LDL 80  -Follow up with PMD to repeat lipid panel and resume atorvastatin as indicated     #Seizure  -Continue Keppra   -Seizure precaution    #HTN  -Amlodipine 5mg daily (reduced dose) and coreg 3.125mg BID   -Monitor BP   -Reevaluate need due to possible symptomatic OH    #History of Diabetes per chart  -HbA1C 5.9  -Monitor glucose via routine lab    #Gout  -Allopurinol 200    #COVID positive (1/31) - completed isolation  -Completed RDV x 5 Day  -Repeat COVID PCR positive 2/12   -Supportive care prn    #Incidental Findings  -1.8 cm left apical lung mass, suspicious for malignancy-new from neck CT of 6/1/2023. ****Outpatient pulmonology follow-up for PET/CT and PFTs  -2 rounded foci in the transverse colon. Differential includes adherent stool versus colonic lesions.****outpatient colonoscopy with Dr. Nelson    #DVT ppx - lovenox

## 2024-02-26 NOTE — DISCHARGE NOTE PROVIDER - HOSPITAL COURSE
HPI:  "Nehemias" is a 72 y/o male with PMH of Parkinson's disease (diagnosed 7 years ago), HTN, 2 x CVA (1998, 2016, minimal residual), seizure (2019 - on keppra), presented to [facility] ED on 1/31 after 2 falls. (of note, patient was admitted for same complaints in 09/22/2022). Trauma w/u was negative for acute traumatic injury. (CT chest, CT abdomen/pelvis, CT c-spine, CT head, xray pelvis, xray chest). Further workup for fall negative. Neurology evaluated the patient and feels that he is having morning freeze episodes secondary to wearing off of sinemet because of lack of long-acting version at bedtime; Sinemet regimen changed with improvement (sinemet 25/100 2 tab at 7am, 12 pm and 5pm; sinemet ER 50/200 1 tab HS (10pm)).     Hospital course complicated by:  - COVID positive on admission  - completed RDV x 5 Day.    - Incidental find of 1.8 cm left apical lung mass, suspicious for malignancy-new from neck CT of 6/1/2023. Will require outpatient pulmonology follow-up for PET/CT and PFTs  - CT abdomen showed: 2 rounded foci in the transverse colon. Differential includes adherent stool versus colonic lesions. Will require outpatient colonoscopy with Dr. Nelson.    Patient was evaluated by PM&R and therapy for functional deficits and gait/ADL impairments and recommend acute rehabilitation.  Patient was medically optimized for discharge to [place] on 2/12/2023     Rehab course significant for adjustments to medications with good improvement. All other medical co-morbidites were stable.    Pt tolerated course of inpatient pt/ot/slp rehab with significant functional improvements and met rehab goals prior to discharge.     Pt was medically cleared on 2/27/24 for discharge to [facility].        HPI:  "Nehemias" is a 72 y/o male with PMH of Parkinson's disease (diagnosed 7 years ago), HTN, 2 x CVA (1998, 2016, minimal residual), seizure (2019 - on keppra), presented to [facility] ED on 1/31 after 2 falls. (of note, patient was admitted for same complaints in 09/22/2022). Trauma w/u was negative for acute traumatic injury. (CT chest, CT abdomen/pelvis, CT c-spine, CT head, xray pelvis, xray chest). Further workup for fall negative. Neurology evaluated the patient and feels that he is having morning freeze episodes secondary to wearing off of sinemet because of lack of long-acting version at bedtime; Sinemet regimen changed with improvement (sinemet 25/100 2 tab at 7am, 12 pm and 5pm; sinemet ER 50/200 1 tab HS (10pm)).     Hospital course complicated by:  - COVID positive on admission  - completed RDV x 5 Day.    - Incidental find of 1.8 cm left apical lung mass, suspicious for malignancy-new from neck CT of 6/1/2023. Will require outpatient pulmonology follow-up for PET/CT and PFTs  - CT abdomen showed: 2 rounded foci in the transverse colon. Differential includes adherent stool versus colonic lesions. Will require outpatient colonoscopy with Dr. Nelson.    Patient was evaluated by PM&R and therapy for functional deficits and gait/ADL impairments and recommend acute rehabilitation.  Patient was medically optimized for discharge to [place] on 2/12/2023     Rehab course significant for adjustments to medications with good improvement. All other medical co-morbidites were stable.    Pt tolerated course of inpatient pt/ot/slp rehab with significant functional improvements and met rehab goals prior to discharge.     Pt was medically cleared on 2/27/24 for discharge to [facility].        Azelaic Acid Pregnancy And Lactation Text: This medication is considered safe during pregnancy and breast feeding.

## 2024-02-26 NOTE — PROGRESS NOTE ADULT - ASSESSMENT
ASSESSMENT/PLAN  74 y/o male with PMH of Parkinson's disease (diagnosed 7 years ago), HTN, 2 x CVA (1998, 2016), seizure (2019 - on keppra), presented to [facility] ED on 1/31 after 2 falls. Fall w/u negative - attribute fall to PD regimen.    #Parkinson's Disease now with gait Instability, ADL impairments and Functional impairments  - Continue Comprehensive Rehab Program of PT/OT/SLP    ------------------------------------------------------  Parkinson's related motor symptoms    #Dyskinesia/Rigidity/Bradykinesia  - Sinemet 25/100 2 tabs 7am, 12pm, 5pm  - Entacapone 200 1 tab at 7am, 12pm and 5pm   - Sinemet ER 50/200 1 tab HS  - Rivastigmine 9.5mg transdermal   - Movement disorders following    --------------------------------------------------------  Parkinson's related non-motor symptoms    #Orthostatic hypotension  -Monitor OH vital signs  -Currently on carvedilol and norvasc - re-evaluate continued need    #Dizziness may be OH related  - Meclizine 12.5 BID PRN - discontinued meclizine due to sedation 2/13 - tolerating the change well  - Dizziness improving, no significant orthostasis     #Sleep/Mood  - Trazodone 100 at bedtime   - Melatonin 6mg HS  - Stop Ambien as may be contributing to dizziness 2/13 - tolerating the change well  - Recreation Therapy  - Neuropsych consult (2/19/24): 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 Outpatient neuropsychological testing, individual supportive psychotherapy, c/w rivastigmine, c/w cognitive remediation through SLT and OT.     #Pain control  - Tylenol PRN, Lidocaine patch   - Muscle relaxant: Methocarbamol 750 TID PRN - stopped due to dizziness   - Continue Gabapentin 100mg at bedtime for LBP 2/13 - pain improvement noted     #GI/Bowel Management/Constipation  - Senna at bedtime, Miralax QD  - Lactulose PRN  - Fleet enema prn for constipation     #Bladder management  - no significant PVR, d/c monitor    ----------------------------------------------------------  Concurrent medical problems    #Prior CVA  - ASA 81  - previously on atorvastatin 40 (now off since 1/2023) - LDL 80    #Seizure  - Keppra 500 BID    #HTN  - Amlodipine 5mg daily   - Carvedilol 3.125 BID  - No significant OH at present     #history of Diabetes per chart  - A1C 5.9  - FS has been <150  - Continue monitoring glucose via lab    #Gout  - Allopurinol 200  - Left foot X-ray (2/23) no acute pathology consider MRI for further eval    #COVID positive (1/31) - completed isolation  - completed RDV x 5 Day    #Incidental finds:  - 1.8 cm left apical lung mass, suspicious for malignancy-new from neck CT of 6/1/2023. ****Outpatient pulmonology follow-up for PET/CT and PFTs  - 2 rounded foci in the transverse colon. Differential includes adherent stool versus colonic lesions.****outpatient colonoscopy with Dr. Nelson.    #DVT Prophylaxis  - Lovenox  - TEDs     #Skin:  -No active issues at this time    FEN   - Diet - Regular + Thins  - Dysphagia  SLP - evaluation and treatment    Precautions / PROPHYLAXIS:   - Falls, Cardiac, Seizure   - ortho: Weight bearing status: WBAT   - Lungs: Aspiration  - Pressure injury/Skin:  OOB to Chair, PT/OT

## 2024-02-26 NOTE — PROGRESS NOTE ADULT - ATTENDING COMMENTS
I have personally seen and examined the patient with the fellow. Medical records reviewed. I have made amendments to the documentation where necessary and adjusted the history, physical examination, and plan as documented by the fellow.
I have personally seen and examined the patient with the fellow. Medical records reviewed. I have made amendments to the documentation where necessary and adjusted the history, physical examination, and plan as documented by the fellow.
I have personally seen and examined the patient with the fellow. Medical records reviewed. I have made amendments to the documentation where necessary and adjusted the history, physical examination, and plan as documented by the fellow.    Multidisciplinary team meeting today:  patient's functional goals and needs, functional and clinical  progress were discussed, barriers to discharge were identified. Anticipate discharge home with home care, 24/7 supervision for safety.    EDOD  2/27/24    40 min spent
I have personally seen and examined the patient with the fellow. Medical records reviewed. I have made amendments to the documentation where necessary and adjusted the history, physical examination, and plan as documented by the fellow.

## 2024-02-26 NOTE — PROGRESS NOTE ADULT - SUBJECTIVE AND OBJECTIVE BOX
Patient is a 73y old  Male who presents with a chief complaint of Parkinson's Disease (25 Feb 2024 10:21)    Patient seen and examined at bedside. No acute overnight events. Denies pain. Slept well, states he feels more energetic today than yesterday.   Reports good appetite. Denies abdominal pain, constipation.     ALLERGIES:  penicillin (Unknown)  sulfa drugs (Unknown)  cefaclor (Unknown)    MEDICATIONS  (STANDING):  allopurinol 200 milliGRAM(s) Oral daily  amLODIPine   Tablet 5 milliGRAM(s) Oral daily  aspirin  chewable 81 milliGRAM(s) Oral daily  carbidopa/levodopa  25/100 2 Tablet(s) Oral <User Schedule>  carbidopa/levodopa CR 50/200 1 Tablet(s) Oral <User Schedule>  carvedilol 3.125 milliGRAM(s) Oral every 12 hours  enoxaparin Injectable 40 milliGRAM(s) SubCutaneous every 24 hours  entacapone 200 milliGRAM(s) Oral <User Schedule>  gabapentin 100 milliGRAM(s) Oral at bedtime  lactulose Syrup 20 Gram(s) Oral daily  levETIRAcetam 500 milliGRAM(s) Oral two times a day  lidocaine   4% Patch 1 Patch Transdermal daily  melatonin 6 milliGRAM(s) Oral at bedtime  polyethylene glycol 3350 17 Gram(s) Oral every 12 hours  rivastigmine patch  9.5 mG/24 Hr(s). 1 Patch Transdermal every 24 hours  senna 2 Tablet(s) Oral at bedtime  traZODone 100 milliGRAM(s) Oral at bedtime    MEDICATIONS  (PRN):  acetaminophen     Tablet .. 650 milliGRAM(s) Oral every 6 hours PRN Mild Pain (1 - 3)  saline laxative (FLEET) Rectal Enema 1 Enema Rectal daily PRN Constipation    Vital Signs Last 24 Hrs  T(F): 98.2 (25 Feb 2024 19:55), Max: 98.2 (25 Feb 2024 19:55)  HR: 88 (25 Feb 2024 19:55) (88 - 88)  BP: 122/76 (25 Feb 2024 19:55) (122/76 - 122/76)  RR: 17 (25 Feb 2024 19:55) (17 - 17)  SpO2: 96% (25 Feb 2024 19:55) (96% - 96%)  I&O's Summary    PHYSICAL EXAM:  General: NAD, awake, alert   ENT: MMM, no tonsilar exudate  Neck: Supple, No JVD  Lungs: Clear to auscultation bilaterally, no wheezes. Good air entry bilaterally   Cardio: RRR, S1/S2, No murmurs  Abdomen: Soft, Nontender, Nondistended; Bowel sounds present  Extremities: No calf tenderness, No pitting edema    LABS:      02-02 Chol 144 mg/dL LDL -- HDL 46 mg/dL Trig 93 mg/dL    RADIOLOGY & ADDITIONAL TESTS:     Care Discussed with Consultants/Other Providers:

## 2024-02-26 NOTE — DISCHARGE NOTE PROVIDER - CARE PROVIDER_API CALL
Axel Nicholas  Neurology  [address]  [place] [address]  Phone: [phone]  Fax: [phone]  Follow Up Time: 1 month    Ha eTjada  Internal Medicine  [address]  [place] [address]  Phone: [phone]  Fax: [phone]  Follow Up Time: 1-3 days    Lina Dempsey  Pulmonary Disease  [address] [place] [address] [address]  [place] [address]  Phone: [phone]  Fax: [phone]  Follow Up Time: 2 weeks    Sam Leon  Gastroenterology  [facility] [place] [address]  Phone: [phone]  Fax: [phone]  Follow Up Time: 1 week

## 2024-02-26 NOTE — DISCHARGE NOTE PROVIDER - NSDCCPCAREPLAN_GEN_ALL_CORE_FT
PRINCIPAL DISCHARGE DIAGNOSIS  Diagnosis: Parkinsons  Assessment and Plan of Treatment: Continue Sinemet 25/100 2 tabs 7am, 12pm, 5pm  - Entacapone 200 1 tab at 7am, 12pm and 5pm   - Sinemet ER 50/200 1 tab HS  - Rivastigmine 9.5mg transdermal   -Follow up with Dr. Nicholas        SECONDARY DISCHARGE DIAGNOSES  Diagnosis: Constipation  Assessment and Plan of Treatment: - Senna at bedtime, Miralax QD  - Lactulose PRN  - Fleet enema prn for constipation   - 2 rounded foci in the transverse colon. Differential includes adherent stool versus colonic lesions.  ****outpatient colonoscopy with Dr. Nelson.      Diagnosis: HTN (hypertension), benign  Assessment and Plan of Treatment: - Amlodipine 5mg daily   - Carvedilol 3.125 BID  Follow up with your cardiologist       Diagnosis: Insomnia  Assessment and Plan of Treatment: - Trazodone 100 at bedtime   - Melatonin 6mg HS  - Stop Ambien as may be contributing to dizziness    Diagnosis: Gout  Assessment and Plan of Treatment: - Allopurinol 200 daily  - Left foot X-ray (2/23) no acute pathology consider MRI for further eval    Diagnosis: Seizures  Assessment and Plan of Treatment: - Keppra 500 BID  Follow up with your neurologist    Diagnosis: History of stroke  Assessment and Plan of Treatment: - ASA 81  - previously on atorvastatin 40 (now off since 1/2023) - LDL 80    Diagnosis: Radicular low back pain  Assessment and Plan of Treatment: - Tylenol PRN, Lidocaine patch   - Stop Methocarbamol 750 TID due to dizziness   - Continue Gabapentin 100mg at bedtime    Diagnosis: Lung mass  Assessment and Plan of Treatment: Incidental finds:  - 1.8 cm left apical lung mass, suspicious for malignancy-new from neck CT of 6/1/2023.   ****Outpatient pulmonology follow-up for PET/CT and PFTs

## 2024-02-26 NOTE — DISCHARGE NOTE PROVIDER - CARE PROVIDERS DIRECT ADDRESSES
[email or web address]_Unknown

## 2024-02-26 NOTE — DISCHARGE NOTE PROVIDER - PROVIDER TOKENS
PROVIDER:[TOKEN:[[id number]],FOLLOWUP:[1 month]],PROVIDER:[TOKEN:[[id number]],FOLLOWUP:[1-3 days]],PROVIDER:[TOKEN:[[id number]],FOLLOWUP:[2 weeks]],PROVIDER:[TOKEN:[[id number]],FOLLOWUP:[1 week]]

## 2024-02-26 NOTE — PROGRESS NOTE ADULT - SUBJECTIVE AND OBJECTIVE BOX
SUBJECTIVE/ROS: Patient seen at bedside. No new complaints. States he is a little tired but overall doing well. He denies chest pain, fever, chills, nausea, vomiting, abdominal pain, headache, or BLE pain.     HPI:  72 y/o male with PMH of Parkinson's disease (diagnosed 7 years ago), HTN, 2 x CVA (1998, 2016, minimal residual), seizure (2019 - on keppra), presented to [facility] ED on 1/31 after 2 falls. (of note, patient was admitted for same complaints in 09/22/2022). Trauma w/u was negative for acute traumatic injury. (CT chest, CT abdomen/pelvis, CT c-spine, CT head, xray pelvis, xray chest). Further workup for fall negative. Neurology evaluated the patient and feels that he is having morning freeze episodes secondary to wearing off of sinemet because of lack of long-acting version at bedtime; Sinemet regimen changed with improvement (sinemet 25/100 2 tab at 7am, 12 pm and 5pm; sinemet ER 50/200 1 tab HS (10pm)).     Hospital course complicated by:  - COVID positive on admission  - completed RDV x 5 Day.    - Incidental find of 1.8 cm left apical lung mass, suspicious for malignancy-new from neck CT of 6/1/2023. Will require outpatient pulmonology follow-up for PET/CT and PFTs  - CT abdomen showed: 2 rounded foci in the transverse colon. Differential includes adherent stool versus colonic lesions. Will require outpatient colonoscopy with Dr. Nelson.    Patient was evaluated by PM&R and therapy for functional deficits and gait/ADL impairments and recommend acute rehabilitation.  Patient was medically optimized for discharge to [facility] on 2/12/2023    Vital Signs Last 24 Hrs  T(C): 36.8 (25 Feb 2024 19:55), Max: 36.8 (25 Feb 2024 19:55)  T(F): 98.2 (25 Feb 2024 19:55), Max: 98.2 (25 Feb 2024 19:55)  HR: 88 (25 Feb 2024 19:55) (88 - 88)  BP: 122/76 (25 Feb 2024 19:55) (122/76 - 122/76)  BP(mean): --  RR: 17 (25 Feb 2024 19:55) (17 - 17)  SpO2: 96% (25 Feb 2024 19:55) (96% - 96%)    Parameters below as of 25 Feb 2024 19:55  Patient On (Oxygen Delivery Method): room air      PHYSICAL EXAM  Constitutional - NAD, Comfortable  HEENT - NCAT, EOMI  Neck - Supple, No limited ROM  Chest - Breathing comfortably   Extremities - No C/C/E, TEDs in place  Neurologic Exam - alert, oriented, mild hypophonia, mild tremor at rest at the left > right hand, reemergent tremor of the outstretched left hand, mild bradykinesia worse on the left side, stands from the chair without using hands, walks independently, stands safely but falls on pull test.     MEDICATIONS  (STANDING):  allopurinol 200 milliGRAM(s) Oral daily  amLODIPine   Tablet 5 milliGRAM(s) Oral daily  aspirin  chewable 81 milliGRAM(s) Oral daily  carbidopa/levodopa  25/100 2 Tablet(s) Oral <User Schedule>  carbidopa/levodopa CR 50/200 1 Tablet(s) Oral <User Schedule>  carvedilol 3.125 milliGRAM(s) Oral every 12 hours  enoxaparin Injectable 40 milliGRAM(s) SubCutaneous every 24 hours  entacapone 200 milliGRAM(s) Oral <User Schedule>  gabapentin 100 milliGRAM(s) Oral at bedtime  lactulose Syrup 20 Gram(s) Oral daily  levETIRAcetam 500 milliGRAM(s) Oral two times a day  lidocaine   4% Patch 1 Patch Transdermal daily  melatonin 6 milliGRAM(s) Oral at bedtime  polyethylene glycol 3350 17 Gram(s) Oral every 12 hours  rivastigmine patch  9.5 mG/24 Hr(s). 1 Patch Transdermal every 24 hours  senna 2 Tablet(s) Oral at bedtime  traZODone 100 milliGRAM(s) Oral at bedtime    MEDICATIONS  (PRN):  acetaminophen     Tablet .. 650 milliGRAM(s) Oral every 6 hours PRN Mild Pain (1 - 3)  saline laxative (FLEET) Rectal Enema 1 Enema Rectal daily PRN Constipation   SUBJECTIVE/ROS: Patient seen at bedside. No new complaints. States he is a little tired but overall doing well. He denies chest pain, fever, chills, nausea, vomiting, abdominal pain, headache, or BLE pain.     HPI:  72 y/o male with PMH of Parkinson's disease (diagnosed 7 years ago), HTN, 2 x CVA (1998, 2016, minimal residual), seizure (2019 - on keppra), presented to [facility] ED on 1/31 after 2 falls. (of note, patient was admitted for same complaints in 09/22/2022). Trauma w/u was negative for acute traumatic injury. (CT chest, CT abdomen/pelvis, CT c-spine, CT head, xray pelvis, xray chest). Further workup for fall negative. Neurology evaluated the patient and feels that he is having morning freeze episodes secondary to wearing off of sinemet because of lack of long-acting version at bedtime; Sinemet regimen changed with improvement (sinemet 25/100 2 tab at 7am, 12 pm and 5pm; sinemet ER 50/200 1 tab HS (10pm)).     Hospital course complicated by:  - COVID positive on admission  - completed RDV x 5 Day.    - Incidental find of 1.8 cm left apical lung mass, suspicious for malignancy-new from neck CT of 6/1/2023. Will require outpatient pulmonology follow-up for PET/CT and PFTs  - CT abdomen showed: 2 rounded foci in the transverse colon. Differential includes adherent stool versus colonic lesions. Will require outpatient colonoscopy with Dr. Nelson.    Patient was evaluated by PM&R and therapy for functional deficits and gait/ADL impairments and recommend acute rehabilitation.  Patient was medically optimized for discharge to [facility] on 2/12/2023    Vital Signs Last 24 Hrs  T(C): 36.8 (25 Feb 2024 19:55), Max: 36.8 (25 Feb 2024 19:55)  T(F): 98.2 (25 Feb 2024 19:55), Max: 98.2 (25 Feb 2024 19:55)  HR: 88 (25 Feb 2024 19:55) (88 - 88)  BP: 122/76 (25 Feb 2024 19:55) (122/76 - 122/76)  BP(mean): --  RR: 17 (25 Feb 2024 19:55) (17 - 17)  SpO2: 96% (25 Feb 2024 19:55) (96% - 96%)    Parameters below as of 25 Feb 2024 19:55  Patient On (Oxygen Delivery Method): room air      PHYSICAL EXAM  Constitutional - NAD, Comfortable  HEENT - NCAT, EOMI  Neck - Supple, No limited ROM  Chest - Breathing comfortably   Extremities - No C/C/E, TEDs in place  Neurologic Exam - alert, oriented, mild hypophonia, mild tremor at rest at the left > right hand, re-emergent tremor of the outstretched left hand, mild bradykinesia worse on the left side, stands from the chair without using hands, walks independently, stands safely but falls on pull test.     MEDICATIONS  (STANDING):  allopurinol 200 milliGRAM(s) Oral daily  amLODIPine   Tablet 5 milliGRAM(s) Oral daily  aspirin  chewable 81 milliGRAM(s) Oral daily  carbidopa/levodopa  25/100 2 Tablet(s) Oral <User Schedule>  carbidopa/levodopa CR 50/200 1 Tablet(s) Oral <User Schedule>  carvedilol 3.125 milliGRAM(s) Oral every 12 hours  enoxaparin Injectable 40 milliGRAM(s) SubCutaneous every 24 hours  entacapone 200 milliGRAM(s) Oral <User Schedule>  gabapentin 100 milliGRAM(s) Oral at bedtime  lactulose Syrup 20 Gram(s) Oral daily  levETIRAcetam 500 milliGRAM(s) Oral two times a day  lidocaine   4% Patch 1 Patch Transdermal daily  melatonin 6 milliGRAM(s) Oral at bedtime  polyethylene glycol 3350 17 Gram(s) Oral every 12 hours  rivastigmine patch  9.5 mG/24 Hr(s). 1 Patch Transdermal every 24 hours  senna 2 Tablet(s) Oral at bedtime  traZODone 100 milliGRAM(s) Oral at bedtime    MEDICATIONS  (PRN):  acetaminophen     Tablet .. 650 milliGRAM(s) Oral every 6 hours PRN Mild Pain (1 - 3)  saline laxative (FLEET) Rectal Enema 1 Enema Rectal daily PRN Constipation

## 2024-02-27 ENCOUNTER — TRANSCRIPTION ENCOUNTER (OUTPATIENT)
Age: 74
End: 2024-02-27

## 2024-02-27 VITALS
OXYGEN SATURATION: 95 % | SYSTOLIC BLOOD PRESSURE: 127 MMHG | RESPIRATION RATE: 18 BRPM | DIASTOLIC BLOOD PRESSURE: 86 MMHG | TEMPERATURE: 98 F | HEART RATE: 82 BPM

## 2024-02-27 LAB
ALBUMIN SERPL ELPH-MCNC: 2.9 G/DL — LOW (ref 3.3–5)
ALP SERPL-CCNC: 80 U/L — SIGNIFICANT CHANGE UP (ref 40–120)
ALT FLD-CCNC: 11 U/L — SIGNIFICANT CHANGE UP (ref 10–45)
ANION GAP SERPL CALC-SCNC: 9 MMOL/L — SIGNIFICANT CHANGE UP (ref 5–17)
AST SERPL-CCNC: 20 U/L — SIGNIFICANT CHANGE UP (ref 10–40)
BILIRUB SERPL-MCNC: 0.7 MG/DL — SIGNIFICANT CHANGE UP (ref 0.2–1.2)
BUN SERPL-MCNC: 23 MG/DL — SIGNIFICANT CHANGE UP (ref 7–23)
CALCIUM SERPL-MCNC: 9.1 MG/DL — SIGNIFICANT CHANGE UP (ref 8.4–10.5)
CHLORIDE SERPL-SCNC: 106 MMOL/L — SIGNIFICANT CHANGE UP (ref 96–108)
CO2 SERPL-SCNC: 26 MMOL/L — SIGNIFICANT CHANGE UP (ref 22–31)
CREAT SERPL-MCNC: 1.03 MG/DL — SIGNIFICANT CHANGE UP (ref 0.5–1.3)
EGFR: 78 ML/MIN/1.73M2 — SIGNIFICANT CHANGE UP
GLUCOSE SERPL-MCNC: 110 MG/DL — HIGH (ref 70–99)
HCT VFR BLD CALC: 45 % — SIGNIFICANT CHANGE UP (ref 39–50)
HGB BLD-MCNC: 14.8 G/DL — SIGNIFICANT CHANGE UP (ref 13–17)
MCHC RBC-ENTMCNC: 32.9 GM/DL — SIGNIFICANT CHANGE UP (ref 32–36)
MCHC RBC-ENTMCNC: 33.7 PG — SIGNIFICANT CHANGE UP (ref 27–34)
MCV RBC AUTO: 102.5 FL — HIGH (ref 80–100)
NRBC # BLD: 0 /100 WBCS — SIGNIFICANT CHANGE UP (ref 0–0)
PLATELET # BLD AUTO: 172 K/UL — SIGNIFICANT CHANGE UP (ref 150–400)
POTASSIUM SERPL-MCNC: 4.9 MMOL/L — SIGNIFICANT CHANGE UP (ref 3.5–5.3)
POTASSIUM SERPL-SCNC: 4.9 MMOL/L — SIGNIFICANT CHANGE UP (ref 3.5–5.3)
PROT SERPL-MCNC: 6.7 G/DL — SIGNIFICANT CHANGE UP (ref 6–8.3)
RBC # BLD: 4.39 M/UL — SIGNIFICANT CHANGE UP (ref 4.2–5.8)
RBC # FLD: 12.5 % — SIGNIFICANT CHANGE UP (ref 10.3–14.5)
SODIUM SERPL-SCNC: 141 MMOL/L — SIGNIFICANT CHANGE UP (ref 135–145)
WBC # BLD: 7.44 K/UL — SIGNIFICANT CHANGE UP (ref 3.8–10.5)
WBC # FLD AUTO: 7.44 K/UL — SIGNIFICANT CHANGE UP (ref 3.8–10.5)

## 2024-02-27 PROCEDURE — 90832 PSYTX W PT 30 MINUTES: CPT

## 2024-02-27 PROCEDURE — 99239 HOSP IP/OBS DSCHRG MGMT >30: CPT

## 2024-02-27 PROCEDURE — 99232 SBSQ HOSP IP/OBS MODERATE 35: CPT

## 2024-02-27 RX ORDER — LIDOCAINE 4 G/100G
1 CREAM TOPICAL
Qty: 30 | Refills: 0
Start: 2024-02-27 | End: 2024-03-27

## 2024-02-27 RX ORDER — ACETAMINOPHEN 160 MG/5ML
2 SUSPENSION ORAL
Qty: 180 | Refills: 0 | DISCHARGE
Start: 2024-02-27 | End: 2024-03-27

## 2024-02-27 RX ORDER — ACETAMINOPHEN 500 MG
2 TABLET ORAL
Qty: 240 | Refills: 0
Start: 2024-02-27 | End: 2024-03-27

## 2024-02-27 RX ADMIN — POLYETHYLENE GLYCOL 3350 17 GRAM(S): 17 POWDER, FOR SOLUTION ORAL at 06:31

## 2024-02-27 RX ADMIN — LACTULOSE 20 GRAM(S): 10 SOLUTION ORAL at 12:19

## 2024-02-27 RX ADMIN — LIDOCAINE 1 PATCH: 4 CREAM TOPICAL at 00:00

## 2024-02-27 RX ADMIN — LEVETIRACETAM 500 MILLIGRAM(S): 250 TABLET, FILM COATED ORAL at 06:32

## 2024-02-27 RX ADMIN — CARBIDOPA AND LEVODOPA 2 TABLET(S): 25; 100 TABLET ORAL at 06:45

## 2024-02-27 RX ADMIN — RIVASTIGMINE 1 PATCH: 4.6 PATCH, EXTENDED RELEASE TRANSDERMAL at 08:00

## 2024-02-27 RX ADMIN — Medication 81 MILLIGRAM(S): at 12:18

## 2024-02-27 RX ADMIN — CARBIDOPA AND LEVODOPA 2 TABLET(S): 25; 100 TABLET ORAL at 12:19

## 2024-02-27 RX ADMIN — CARVEDILOL PHOSPHATE 3.12 MILLIGRAM(S): 80 CAPSULE, EXTENDED RELEASE ORAL at 06:32

## 2024-02-27 RX ADMIN — AMLODIPINE BESYLATE 5 MILLIGRAM(S): 2.5 TABLET ORAL at 06:33

## 2024-02-27 RX ADMIN — ENTACAPONE 200 MILLIGRAM(S): 200 TABLET, FILM COATED ORAL at 06:45

## 2024-02-27 RX ADMIN — Medication 200 MILLIGRAM(S): at 12:19

## 2024-02-27 RX ADMIN — ENTACAPONE 200 MILLIGRAM(S): 200 TABLET, FILM COATED ORAL at 12:18

## 2024-02-27 NOTE — PROGRESS NOTE ADULT - SUBJECTIVE AND OBJECTIVE BOX
Pt was seen for 25 min for supportive tx. Pt c/o fatigue, poor sleep. Pt reported doing well today in preparation for d/c. He denied pain, feelings of anxiety or sadness or any pressing concerns. Pt reported feeling tired because he woke up around 3-4 a.m. and was unable to return to sleep. Pt reported feeling much better than at admission, and being able to walk closely like before getting sick with Covid-19 and being able to perform his ADLs independently. Pt expressed appreciation for the training and assistance provided during his stay in the program by all staff members, and feels that it was the right thing to come to the movement disorders program. 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 But expressed awareness that it can change over time. Discussed assets that Pt has that help him to cope and accept his limitations, as well as the importance of a here-and-now set to help coping and hopefully remaining active and satisfied. Also discussed issues relevant at d/c such as safety/fall prevention, adherence to medical tx, medications and other f/u appointments recommended by the movement disorders team, which were framed as Pt's best course of action to remain in good health and maximizing clinical gains. Pt expressed understanding of all the issues discussed and agreed to f/u. Support and encouragement were provided.

## 2024-02-27 NOTE — DISCHARGE NOTE NURSING/CASE MANAGEMENT/SOCIAL WORK - NSDCFUADDAPPT_GEN_ALL_CORE_FT
You have been provided a list of [facility] Locations of where you can use the out patient prescriptions that have been provided to you. You have been provided Physical Therapy, Occupational Therapy and Speech prescriptions.

## 2024-02-27 NOTE — PROGRESS NOTE ADULT - SUBJECTIVE AND OBJECTIVE BOX
Patient is a 73y old  Male who presents with a chief complaint of Parkinson's Disease (26 Feb 2024 18:38)    Patient seen and examined at bedside. No acute overnight events.   Woke up at 3am and was unable to sleep afterwards  Denies pain. last BM yesterday    ALLERGIES:  penicillin (Unknown)  sulfa drugs (Unknown)  cefaclor (Unknown)    MEDICATIONS  (STANDING):  allopurinol 200 milliGRAM(s) Oral daily  amLODIPine   Tablet 5 milliGRAM(s) Oral daily  aspirin  chewable 81 milliGRAM(s) Oral daily  carbidopa/levodopa  25/100 2 Tablet(s) Oral <User Schedule>  carbidopa/levodopa CR 50/200 1 Tablet(s) Oral <User Schedule>  carvedilol 3.125 milliGRAM(s) Oral every 12 hours  enoxaparin Injectable 40 milliGRAM(s) SubCutaneous every 24 hours  entacapone 200 milliGRAM(s) Oral <User Schedule>  gabapentin 100 milliGRAM(s) Oral at bedtime  lactulose Syrup 20 Gram(s) Oral daily  levETIRAcetam 500 milliGRAM(s) Oral two times a day  lidocaine   4% Patch 1 Patch Transdermal daily  melatonin 6 milliGRAM(s) Oral at bedtime  polyethylene glycol 3350 17 Gram(s) Oral every 12 hours  rivastigmine patch  9.5 mG/24 Hr(s). 1 Patch Transdermal every 24 hours  senna 2 Tablet(s) Oral at bedtime  traZODone 100 milliGRAM(s) Oral at bedtime    MEDICATIONS  (PRN):  acetaminophen     Tablet .. 650 milliGRAM(s) Oral every 6 hours PRN Mild Pain (1 - 3)  saline laxative (FLEET) Rectal Enema 1 Enema Rectal daily PRN Constipation    Vital Signs Last 24 Hrs  T(F): 97.8 (27 Feb 2024 08:03), Max: 98 (27 Feb 2024 06:37)  HR: 82 (27 Feb 2024 08:03) (82 - 82)  BP: 127/86 (27 Feb 2024 08:03) (127/86 - 127/86)  RR: 18 (27 Feb 2024 08:03) (18 - 18)  SpO2: 95% (27 Feb 2024 08:03) (95% - 95%)  I&O's Summary    PHYSICAL EXAM:  General: NAD, A/O x 3  ENT: MMM, no tonsilar exudate  Neck: Supple, No JVD  Lungs: Clear to auscultation bilaterally, no wheezes. Good air entry bilaterally   Cardio: RRR, S1/S2, No murmurs  Abdomen: Soft, Nontender, Nondistended; Bowel sounds present  Extremities: No calf tenderness, No pitting edema    LABS:                        14.8   7.44  )-----------( 172      ( 27 Feb 2024 06:50 )             45.0       02-27    141  |  106  |  23  ----------------------------<  110  4.9   |  26  |  1.03    Ca    9.1      27 Feb 2024 06:50    TPro  6.7  /  Alb  2.9  /  TBili  0.7  /  DBili  x   /  AST  20  /  ALT  11  /  AlkPhos  80  02-27     02-02 Chol 144 mg/dL LDL -- HDL 46 mg/dL Trig 93 mg/dL    Urinalysis Basic - ( 27 Feb 2024 06:50 )    Color: x / Appearance: x / SG: x / pH: x  Gluc: 110 mg/dL / Ketone: x  / Bili: x / Urobili: x   Blood: x / Protein: x / Nitrite: x   Leuk Esterase: x / RBC: x / WBC x   Sq Epi: x / Non Sq Epi: x / Bacteria: x    RADIOLOGY & ADDITIONAL TESTS:     Care Discussed with Consultants/Other Providers:

## 2024-02-27 NOTE — PROGRESS NOTE ADULT - SUBJECTIVE AND OBJECTIVE BOX
SUBJECTIVE/ROS: Patient seen at bedside.  He denies chest pain, fever, chills, nausea, vomiting, abdominal pain, headache, or BLE pain. Patient is medically stable for discharge home. All questions answered and discharge instructions reviewed.     HPI:  72 y/o male with PMH of Parkinson's disease (diagnosed 7 years ago), HTN, 2 x CVA (1998, 2016, minimal residual), seizure (2019 - on keppra), presented to [facility] ED on 1/31 after 2 falls. (of note, patient was admitted for same complaints in 09/22/2022). Trauma w/u was negative for acute traumatic injury. (CT chest, CT abdomen/pelvis, CT c-spine, CT head, xray pelvis, xray chest). Further workup for fall negative. Neurology evaluated the patient and feels that he is having morning freeze episodes secondary to wearing off of sinemet because of lack of long-acting version at bedtime; Sinemet regimen changed with improvement (sinemet 25/100 2 tab at 7am, 12 pm and 5pm; sinemet ER 50/200 1 tab HS (10pm)).     Hospital course complicated by:  - COVID positive on admission  - completed RDV x 5 Day.    - Incidental find of 1.8 cm left apical lung mass, suspicious for malignancy-new from neck CT of 6/1/2023. Will require outpatient pulmonology follow-up for PET/CT and PFTs  - CT abdomen showed: 2 rounded foci in the transverse colon. Differential includes adherent stool versus colonic lesions. Will require outpatient colonoscopy with Dr. Nelson.    Patient was evaluated by PM&R and therapy for functional deficits and gait/ADL impairments and recommend acute rehabilitation.  Patient was medically optimized for discharge to [facility] on 2/12/2023    Vital Signs Last 24 Hrs  T(C): 36.6 (27 Feb 2024 08:03), Max: 36.7 (27 Feb 2024 06:37)  T(F): 97.8 (27 Feb 2024 08:03), Max: 98 (27 Feb 2024 06:37)  HR: 82 (27 Feb 2024 08:03) (82 - 82)  BP: 127/86 (27 Feb 2024 08:03) (127/86 - 127/86)  BP(mean): --  RR: 18 (27 Feb 2024 08:03) (18 - 18)  SpO2: 95% (27 Feb 2024 08:03) (95% - 95%)    Parameters below as of 27 Feb 2024 08:03  Patient On (Oxygen Delivery Method): room air          PHYSICAL EXAM  Constitutional - NAD, Comfortable  HEENT - NCAT, EOMI  Chest - Breathing comfortably   Extremities - No C/C/E, TEDs in place  Neurologic Exam - alert, oriented, mild hypophonia, mild tremor at rest at the left > right hand, re-emergent tremor of the outstretched left hand, mild bradykinesia worse on the left side, stands from the chair without using hands, walks independently, stands safely but falls on pull test.           LABS:                          14.8   7.44  )-----------( 172      ( 27 Feb 2024 06:50 )             45.0     02-27    141  |  106  |  23  ----------------------------<  110<H>  4.9   |  26  |  1.03    Ca    9.1      27 Feb 2024 06:50    TPro  6.7  /  Alb  2.9<L>  /  TBili  0.7  /  DBili  x   /  AST  20  /  ALT  11  /  AlkPhos  80  02-27    LIVER FUNCTIONS - ( 27 Feb 2024 06:50 )  Alb: 2.9 g/dL / Pro: 6.7 g/dL / ALK PHOS: 80 U/L / ALT: 11 U/L / AST: 20 U/L / GGT: x               MEDICATIONS  (STANDING):  allopurinol 200 milliGRAM(s) Oral daily  amLODIPine   Tablet 5 milliGRAM(s) Oral daily  aspirin  chewable 81 milliGRAM(s) Oral daily  carbidopa/levodopa  25/100 2 Tablet(s) Oral <User Schedule>  carbidopa/levodopa CR 50/200 1 Tablet(s) Oral <User Schedule>  carvedilol 3.125 milliGRAM(s) Oral every 12 hours  enoxaparin Injectable 40 milliGRAM(s) SubCutaneous every 24 hours  entacapone 200 milliGRAM(s) Oral <User Schedule>  gabapentin 100 milliGRAM(s) Oral at bedtime  lactulose Syrup 20 Gram(s) Oral daily  levETIRAcetam 500 milliGRAM(s) Oral two times a day  lidocaine   4% Patch 1 Patch Transdermal daily  melatonin 6 milliGRAM(s) Oral at bedtime  polyethylene glycol 3350 17 Gram(s) Oral every 12 hours  rivastigmine patch  9.5 mG/24 Hr(s). 1 Patch Transdermal every 24 hours  senna 2 Tablet(s) Oral at bedtime  traZODone 100 milliGRAM(s) Oral at bedtime    MEDICATIONS  (PRN):  acetaminophen     Tablet .. 650 milliGRAM(s) Oral every 6 hours PRN Mild Pain (1 - 3)  saline laxative (FLEET) Rectal Enema 1 Enema Rectal daily PRN Constipation

## 2024-02-27 NOTE — PROGRESS NOTE ADULT - ASSESSMENT
72 y/o M with PMH of Parkinson's disease (diagnosed 7 years ago), HTN, 2 x CVA (1998, 2016), seizure (2019 - on keppra), presented to [facility] ED on 1/31 after 2 falls. Fall w/u negative - fall attributed to PD regimen. Hospital course complicated by COVID positive on admission completed RDV x 5 Day.  Imaging with Incidental finding of 1.8 cm left apical lung mass, suspicious for malignancy-new from neck CT of 6/1/2023 and 2 rounded foci in the transverse colon. Differential includes adherent stool versus colonic lesions. Will require outpatient colonoscopy with Dr. Nelson. Patient was evaluated by PM&R and therapy for functional deficits and gait/ADL impairments and recommend acute rehabilitation.  Patient was medically optimized for discharge to [facility] on 2/12/2023    #Parkinson's related motor symptoms  #Dyskinesia/Rigidity/Bradykinesia  -Sinemet, Entacapone, rivastigmine per neuro, movement disorder specialist   -Fall precaution   -Pain control and bowel regimen per rehab team     #Orthostatic hypotension  -Monitor OH vital signs  -TEDS and abd binders   -Continue coreg, norvasc with holding parameters    #Dizziness - improved   -Meclizine held for sedation    #Prior CVA  -Continue ASA 81  -Previously on atorvastatin 40mg (now off since 1/2023) - LDL 80  -Follow up with PMD to repeat lipid panel and resume atorvastatin as indicated     #Seizure  -Continue Keppra   -Seizure precaution    #HTN  -Amlodipine 5mg daily (reduced dose) and coreg 3.125mg BID   -Monitor BP   -Reevaluate need due to possible symptomatic OH    #History of Diabetes per chart  -HbA1C 5.9  -Monitor glucose via routine lab    #Gout  -Allopurinol 200    #COVID positive (1/31) - completed isolation  -Completed RDV x 5 Day  -Repeat COVID PCR positive 2/12   -Supportive care prn    #Incidental Findings  -1.8 cm left apical lung mass, suspicious for malignancy-new from neck CT of 6/1/2023. ****Outpatient pulmonology follow-up for PET/CT and PFTs  -2 rounded foci in the transverse colon. Differential includes adherent stool versus colonic lesions.****outpatient colonoscopy with Dr. Nelson    #DVT ppx - lovenox

## 2024-02-27 NOTE — PROGRESS NOTE ADULT - NUTRITIONAL ASSESSMENT
This patient has been assessed with a concern for Malnutrition and has been determined to have a diagnosis/diagnoses of Severe protein-calorie malnutrition.    This patient is being managed with:   Diet Regular-  Supplement Feeding Modality:  Oral  Ensure Plus High Protein Cans or Servings Per Day:  1       Frequency:  Three Times a day  Entered: Feb 13 2024  4:13PM  

## 2024-02-27 NOTE — PROGRESS NOTE ADULT - PROVIDER SPECIALTY LIST ADULT
Hospitalist
Neuro Rehabilitation
Rehab Medicine
Hospitalist
Neuropsychology
Physiatry
Hospitalist
Neuro Rehabilitation
Internal Medicine
Neuro Rehabilitation
Neuro Rehabilitation
Neuropsychology
Hospitalist
Physiatry

## 2024-02-27 NOTE — DISCHARGE NOTE NURSING/CASE MANAGEMENT/SOCIAL WORK - NSDCPEFALRISK_GEN_ALL_CORE
For information on Fall & Injury Prevention, visit: [email or web address] OR  [email or web address] OR  https://www.cdc.gov/steadi/patient.html

## 2024-02-27 NOTE — DISCHARGE NOTE NURSING/CASE MANAGEMENT/SOCIAL WORK - NSDCVIVACCINE_GEN_ALL_CORE_FT
Tdap; 12-May-2020 22:10; Angelina Gomez (RN); [employer]; [id number] (Exp. Date: 19-Apr-2021); IntraMuscular; Deltoid Right.; 0.5 milliLiter(s); VIS (VIS Published: 09-May-2013, VIS Presented: 12-May-2020);

## 2024-02-27 NOTE — PROGRESS NOTE ADULT - ASSESSMENT
ASSESSMENT/PLAN  72 y/o male with PMH of Parkinson's disease (diagnosed 7 years ago), HTN, 2 x CVA (1998, 2016), seizure (2019 - on keppra), presented to [facility] ED on 1/31 after 2 falls. Fall w/u negative - attribute fall to PD regimen.    #Parkinson's Disease now with gait Instability, ADL impairments and Functional impairments  ------------------------------------------------------  Parkinson's related motor symptoms    #Dyskinesia/Rigidity/Bradykinesia  - Sinemet 25/100 2 tabs 7am, 12pm, 5pm  - Entacapone 200 1 tab at 7am, 12pm and 5pm   - Sinemet ER 50/200 1 tab HS  - Rivastigmine 9.5mg transdermal     --------------------------------------------------------  Parkinson's related non-motor symptoms    #Dizziness may be OH related  - Stop meclizine  -dizziness resolved     #Sleep/Mood  - Trazodone 100 at bedtime   - Melatonin 6mg HS  - Stop Ambien as may be contributing to dizziness 2/13 - tolerating the change well  - Neuropsych consult (2/19/24): 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 Outpatient neuropsychological testing, individual supportive psychotherapy, c/w rivastigmine, c/w cognitive remediation through SLT and OT.     #Pain control  - Tylenol PRN, Lidocaine patch   -Stop methocarbamol   - Continue Gabapentin 100mg at bedtime for LBP 2/13     #GI/Bowel Management/Constipation  - Senna at bedtime, Miralax QD  - Lactulose PRN  - Fleet enema prn for constipation   ----------------------------------------------------------  Concurrent medical problems    #Prior CVA  - ASA 81  - previously on atorvastatin 40 (now off since 1/2023) - LDL 80    #Seizure  - Keppra 500 BID    #HTN  - Amlodipine 5mg daily   - Carvedilol 3.125 BID    #history of Diabetes per chart  - A1C 5.9  - FS has been <150  - Continue monitoring glucose via lab    #Gout  - Allopurinol 200  - Left foot X-ray (2/23) no acute pathology consider MRI for further eval    #Incidental finds:  - 1.8 cm left apical lung mass, suspicious for malignancy-new from neck CT of 6/1/2023. ****Outpatient pulmonology follow-up for PET/CT and PFTs  - 2 rounded foci in the transverse colon. Differential includes adherent stool versus colonic lesions.****outpatient colonoscopy with Dr. Nelson.    MEDICALLY STABLE AND READY FOR DISCHARGE TO [facility].

## 2024-02-27 NOTE — DISCHARGE NOTE NURSING/CASE MANAGEMENT/SOCIAL WORK - PATIENT PORTAL LINK FT
You can access the FollowMyHealth Patient Portal offered by [facility] by registering at the following website: [email or web address]. By joining [employer]’s FollowMyHealth portal, you will also be able to view your health information using other applications (apps) compatible with our system.

## 2024-02-27 NOTE — PROGRESS NOTE ADULT - REASON FOR ADMISSION
Parkinson's Disease

## 2024-02-27 NOTE — PROGRESS NOTE ADULT - ASSESSMENT
Pt alert, attentive, intact expressive language, thought processes goal-directed, no morbid thought contents noted, mildly depressed affect, euthymic mood, denied AH/VH, denied SI/HI/I/P, calm behavior, improved coping, Pt stable at d/c. Plan: Case is closed.

## 2024-02-27 NOTE — PROGRESS NOTE ADULT - NS ATTEND AMEND GEN_ALL_CORE FT
I have personally seen and examined the patient with the NP. Medical records reviewed. I have made amendments to the documentation where necessary and adjusted the history, physical examination, and plan as documented by the NP.    Vital Signs (24 Hrs):  T(C): 36.6 (02-13-24 @ 08:59), Max: 36.9 (02-12-24 @ 20:55)  HR: 90 (02-13-24 @ 08:59) (71 - 99)  BP: 125/86 (02-13-24 @ 08:59) (110/72 - 146/81)  RR: 16 (02-13-24 @ 08:59) (16 - 16)  SpO2: 94% (02-13-24 @ 08:59) (94% - 97%)        PHYSICAL EXAM  Constitutional - NAD, Comfortable  HEENT - NCAT, EOMI  Neck - Supple, No limited ROM  Chest - Breathing comfortably   Cardiovascular - RRR, S1S2  Abdomen -BS+, Soft, NTND  Extremities - No C/C/E, No calf tenderness   Neurologic Exam - aoo to self, place, and time, no aphasia,   CN2-12 - intact  Motor -  5/5, left hand mild resting tremor, BUE rigidity, bradykinesia, hypophonia  Unsteady gait
I have personally seen and examined the patient with the NP. Medical records reviewed. I have made amendments to the documentation where necessary and adjusted the history, physical examination, and plan as documented by the NP.    Patient is being discharged home with home care today.  Discharge instructions were discussed with patient, all current medications were sent to the pharmacy. Patient was  educated on importance of medication compliance,  continued  care with PMD and follow-up care with the specialists in the community. Safety and fall risk precautions  were discussed in detail, counseled on healthy life style modifications.  All questions were answered to their satisfaction.      40 min spent
I have personally seen and examined the patient with the NP. Medical records reviewed. I have made amendments to the documentation where necessary and adjusted the history, physical examination, and plan as documented by the NP.    Vital Signs (24 Hrs):  T(C): 36.5 (02-23-24 @ 08:19), Max: 36.6 (02-22-24 @ 22:00)  HR: 83 (02-23-24 @ 08:19) (83 - 94)  BP: 131/81 (02-23-24 @ 08:19) (119/76 - 131/81)  RR: 16 (02-23-24 @ 08:19) (16 - 16)  SpO2: 95% (02-23-24 @ 08:19) (94% - 95%)      PHYSICAL EXAM  Constitutional - NAD, Comfortable  HEENT - NCAT, EOMI  Neck - Supple, No limited ROM  Chest - Breathing comfortably   Extremities - No C/C/E, TEDs in place  Neurologic Exam - alert, oriented x#  CN2-12 - intact  Motor - hypophonia, slight tremor of the left > right hand, mild bradykinesia worse on the left side.               no focal weakness  Improving gait stability
I have personally seen and examined the patient with the NP. Medical records reviewed. I have made amendments to the documentation where necessary and adjusted the history, physical examination, and plan as documented by the NP.
I have personally seen and examined the patient with the NP. Medical records reviewed. I have made amendments to the documentation where necessary and adjusted the history, physical examination, and plan as documented by the NP.
I have personally seen and examined the patient with the NP. Medical records reviewed. I have made amendments to the documentation where necessary and adjusted the history, physical examination, and plan as documented by the NP.    Vital Signs (24 Hrs):  T(C): 36.5 (02-14-24 @ 09:17), Max: 36.7 (02-13-24 @ 21:58)  HR: 79 (02-14-24 @ 07:08) (79 - 101)  BP: 113/76 (02-14-24 @ 07:08) (101/65 - 130/87)  RR: 16 (02-14-24 @ 09:17) (16 - 16)  SpO2: 100% (02-14-24 @ 09:17) (94% - 100%)    PHYSICAL EXAM  Constitutional - NAD, Comfortable  HEENT - NCAT, EOMI  Neck - Supple, No limited ROM  Chest - Breathing comfortably   Cardiovascular - RRR, S1S2  Abdomen -BS+, Soft, NTND  Extremities - No C/C/E, No calf tenderness   Neurologic Exam - aoo to self, place, and time, no aphasia,   CN2-12 - intact  Motor -  5/5, left hand mild resting tremor, BUE rigidity, bradykinesia, hypophonia  Unsteady gait is improving    Multidisciplinary team meeting today:  patient's functional goals and needs, functional and clinical  progress were discussed, barriers to discharge were identified. Anticipate discharge home with home care, 24/7 supervision for safety.    EDOD 2/23/24    59 min spent

## 2024-03-28 PROCEDURE — 97535 SELF CARE MNGMENT TRAINING: CPT | Mod: GO

## 2024-03-28 PROCEDURE — 97530 THERAPEUTIC ACTIVITIES: CPT | Mod: GP

## 2024-03-28 PROCEDURE — 97112 NEUROMUSCULAR REEDUCATION: CPT | Mod: GO

## 2024-03-28 PROCEDURE — 92610 EVALUATE SWALLOWING FUNCTION: CPT | Mod: GN

## 2024-03-28 PROCEDURE — 97165 OT EVAL LOW COMPLEX 30 MIN: CPT | Mod: GO

## 2024-03-28 PROCEDURE — 80053 COMPREHEN METABOLIC PANEL: CPT

## 2024-03-28 PROCEDURE — 85025 COMPLETE CBC W/AUTO DIFF WBC: CPT

## 2024-03-28 PROCEDURE — 97116 GAIT TRAINING THERAPY: CPT | Mod: GP

## 2024-03-28 PROCEDURE — 93005 ELECTROCARDIOGRAM TRACING: CPT

## 2024-03-28 PROCEDURE — 97110 THERAPEUTIC EXERCISES: CPT | Mod: GO

## 2024-03-28 PROCEDURE — 97161 PT EVAL LOW COMPLEX 20 MIN: CPT | Mod: GP

## 2024-03-28 PROCEDURE — 83036 HEMOGLOBIN GLYCOSYLATED A1C: CPT

## 2024-03-28 PROCEDURE — 36415 COLL VENOUS BLD VENIPUNCTURE: CPT

## 2024-03-28 PROCEDURE — 93971 EXTREMITY STUDY: CPT

## 2024-03-28 PROCEDURE — 87637 SARSCOV2&INF A&B&RSV AMP PRB: CPT

## 2024-03-28 PROCEDURE — 85027 COMPLETE CBC AUTOMATED: CPT

## 2024-03-28 PROCEDURE — 73630 X-RAY EXAM OF FOOT: CPT

## 2024-03-28 PROCEDURE — 92523 SPEECH SOUND LANG COMPREHEN: CPT | Mod: GN

## 2024-03-28 PROCEDURE — 92507 TX SP LANG VOICE COMM INDIV: CPT | Mod: GN

## 2024-04-08 ENCOUNTER — APPOINTMENT (OUTPATIENT)
Dept: GASTROENTEROLOGY | Facility: CLINIC | Age: 74
End: 2024-04-08
Payer: MEDICARE

## 2024-04-08 VITALS
HEIGHT: 68 IN | WEIGHT: 158 LBS | DIASTOLIC BLOOD PRESSURE: 89 MMHG | BODY MASS INDEX: 23.95 KG/M2 | SYSTOLIC BLOOD PRESSURE: 150 MMHG | HEART RATE: 78 BPM

## 2024-04-08 DIAGNOSIS — Z82.49 FAMILY HISTORY OF ISCHEMIC HEART DISEASE AND OTHER DISEASES OF THE CIRCULATORY SYSTEM: ICD-10-CM

## 2024-04-08 DIAGNOSIS — Z86.39 PERSONAL HISTORY OF OTHER ENDOCRINE, NUTRITIONAL AND METABOLIC DISEASE: ICD-10-CM

## 2024-04-08 DIAGNOSIS — Z86.79 PERSONAL HISTORY OF OTHER DISEASES OF THE CIRCULATORY SYSTEM: ICD-10-CM

## 2024-04-08 DIAGNOSIS — K59.00 CONSTIPATION, UNSPECIFIED: ICD-10-CM

## 2024-04-08 DIAGNOSIS — Z87.39 PERSONAL HISTORY OF OTHER DISEASES OF THE MUSCULOSKELETAL SYSTEM AND CONNECTIVE TISSUE: ICD-10-CM

## 2024-04-08 DIAGNOSIS — Z78.9 OTHER SPECIFIED HEALTH STATUS: ICD-10-CM

## 2024-04-08 DIAGNOSIS — Z86.73 PERSONAL HISTORY OF TRANSIENT ISCHEMIC ATTACK (TIA), AND CEREBRAL INFARCTION W/OUT RESIDUAL DEFICITS: ICD-10-CM

## 2024-04-08 DIAGNOSIS — Z12.11 ENCOUNTER FOR SCREENING FOR MALIGNANT NEOPLASM OF COLON: ICD-10-CM

## 2024-04-08 PROCEDURE — 99214 OFFICE O/P EST MOD 30 MIN: CPT

## 2024-04-08 RX ORDER — LORATADINE 5 MG
17 TABLET,CHEWABLE ORAL
Refills: 0 | Status: ACTIVE | COMMUNITY
Start: 2024-04-08

## 2024-04-08 RX ORDER — SENNOSIDES 8.6 MG TABLETS 8.6 MG/1
8.6 TABLET ORAL
Refills: 0 | Status: ACTIVE | COMMUNITY
Start: 2024-04-08

## 2024-04-08 RX ORDER — SODIUM SULFATE, POTASSIUM SULFATE AND MAGNESIUM SULFATE 1.6; 3.13; 17.5 G/177ML; G/177ML; G/177ML
17.5-3.13-1.6 SOLUTION ORAL
Qty: 2 | Refills: 0 | Status: ACTIVE | COMMUNITY
Start: 2024-04-08 | End: 1900-01-01

## 2024-04-08 NOTE — REVIEW OF SYSTEMS
[Recent Weight Loss (___ Lbs)] : recent [unfilled] ~Ulb weight loss [As Noted in HPI] : as noted in HPI [Constipation] : constipation [Negative] : Heme/Lymph

## 2024-04-08 NOTE — PHYSICAL EXAM

## 2024-04-09 ENCOUNTER — APPOINTMENT (OUTPATIENT)
Dept: PULMONOLOGY | Facility: CLINIC | Age: 74
End: 2024-04-09
Payer: MEDICARE

## 2024-04-09 VITALS
RESPIRATION RATE: 14 BRPM | SYSTOLIC BLOOD PRESSURE: 108 MMHG | WEIGHT: 157 LBS | OXYGEN SATURATION: 97 % | HEART RATE: 58 BPM | DIASTOLIC BLOOD PRESSURE: 62 MMHG | BODY MASS INDEX: 23.79 KG/M2 | HEIGHT: 68 IN

## 2024-04-09 DIAGNOSIS — R91.8 OTHER NONSPECIFIC ABNORMAL FINDING OF LUNG FIELD: ICD-10-CM

## 2024-04-09 DIAGNOSIS — R91.1 SOLITARY PULMONARY NODULE: ICD-10-CM

## 2024-04-09 PROCEDURE — 99214 OFFICE O/P EST MOD 30 MIN: CPT

## 2024-04-09 NOTE — HISTORY OF PRESENT ILLNESS
[Never] : never [TextBox_4] : Patient is a 72 y/o male with PMH of Parkinson's disease (diagnosed 7 years ago, HTN, 2 x CVA (1998, 2016), DM, Seizure (2019) on keppra, H/O Gout, H/O lapo band surgery with removal about 2016, recent admission to the hospital for fall incidentally found to have an apical lung nodule. He endorses weight loss over the last several years and decreased energy. No other complaints at this time.

## 2024-04-09 NOTE — PHYSICAL EXAM
[No Acute Distress] : no acute distress [Normal Rate/Rhythm] : normal rate/rhythm [Normal S1, S2] : normal s1, s2 [No Murmurs] : no murmurs [No Resp Distress] : no resp distress [Clear to Auscultation Bilaterally] : clear to auscultation bilaterally [No Abnormalities] : no abnormalities [Benign] : benign [No Edema] : no edema

## 2024-04-09 NOTE — ASSESSMENT
[FreeTextEntry1] : Left Apical Lung Nodule 1.8 cm R/o malignancy - non smoker  Going tomorrow for colonoscopy with GI for lesions found in transverse colon PFTs PET scan CT chest robotic protocol Plan for robotic assisted bronchoscopy and biopsy next week  RTC in 1 month

## 2024-04-10 ENCOUNTER — TRANSCRIPTION ENCOUNTER (OUTPATIENT)
Age: 74
End: 2024-04-10

## 2024-04-10 ENCOUNTER — RESULT REVIEW (OUTPATIENT)
Age: 74
End: 2024-04-10

## 2024-04-10 ENCOUNTER — OUTPATIENT (OUTPATIENT)
Dept: OUTPATIENT SERVICES | Facility: HOSPITAL | Age: 74
LOS: 1 days | Discharge: HOME | End: 2024-04-10
Payer: MEDICARE

## 2024-04-10 VITALS
OXYGEN SATURATION: 98 % | HEART RATE: 58 BPM | SYSTOLIC BLOOD PRESSURE: 142 MMHG | RESPIRATION RATE: 19 BRPM | DIASTOLIC BLOOD PRESSURE: 68 MMHG

## 2024-04-10 VITALS
HEART RATE: 62 BPM | RESPIRATION RATE: 19 BRPM | DIASTOLIC BLOOD PRESSURE: 86 MMHG | HEIGHT: 68 IN | TEMPERATURE: 97 F | WEIGHT: 156.97 LBS | SYSTOLIC BLOOD PRESSURE: 138 MMHG

## 2024-04-10 DIAGNOSIS — R93.3 ABNORMAL FINDINGS ON DIAGNOSTIC IMAGING OF OTHER PARTS OF DIGESTIVE TRACT: ICD-10-CM

## 2024-04-10 DIAGNOSIS — Z90.49 ACQUIRED ABSENCE OF OTHER SPECIFIED PARTS OF DIGESTIVE TRACT: Chronic | ICD-10-CM

## 2024-04-10 DIAGNOSIS — K59.00 CONSTIPATION, UNSPECIFIED: ICD-10-CM

## 2024-04-10 PROCEDURE — 45380 COLONOSCOPY AND BIOPSY: CPT | Mod: XU

## 2024-04-10 PROCEDURE — 45390 COLONOSCOPY W/RESECTION: CPT | Mod: XU

## 2024-04-10 PROCEDURE — 88305 TISSUE EXAM BY PATHOLOGIST: CPT

## 2024-04-10 PROCEDURE — 88312 SPECIAL STAINS GROUP 1: CPT

## 2024-04-10 PROCEDURE — 88307 TISSUE EXAM BY PATHOLOGIST: CPT | Mod: 26

## 2024-04-10 PROCEDURE — C1889: CPT

## 2024-04-10 PROCEDURE — 43239 EGD BIOPSY SINGLE/MULTIPLE: CPT | Mod: XS

## 2024-04-10 PROCEDURE — 88307 TISSUE EXAM BY PATHOLOGIST: CPT

## 2024-04-10 PROCEDURE — 88305 TISSUE EXAM BY PATHOLOGIST: CPT | Mod: 26

## 2024-04-10 PROCEDURE — 45385 COLONOSCOPY W/LESION REMOVAL: CPT

## 2024-04-10 PROCEDURE — 88312 SPECIAL STAINS GROUP 1: CPT | Mod: 26

## 2024-04-10 NOTE — CHART NOTE - NSCHARTNOTEFT_GEN_A_CORE
ANESTHESIA to PACU NOTE      ____ Intubated  TV:______       Rate: ______      FiO2: ______    _xx___ Patent Airway    _x___ Full return of protective reflexes    ____ Full recovery from anesthesia / sedation to baseline status    Vitals:  HR 75  /60  RR 15   O2sat. 100%  Temp: 36.5C      Mental Status:  ____ Awake   _____ Alert   __x___ Drowsy   _____ Sedated    Nausea/Vomiting: ____ Yes, See Post - Op Orders      _x___ No    Pain Scale (0-10): _____    Treatment: _x___ None    ____ See Post - Op/PCA Orders    Post - Operative Fluids:   ____ Oral   _x___ See Post - Op Orders    Plan:  Discharge to:   _x___Home       _____Floor      _____Critical Care    _____ Other:_________________    Comments: s/p TIVA. No anesthesia complications. Pt's condition is stable in PACU. Full report is given to PACU RN.
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
## 2024-04-10 NOTE — ASU DISCHARGE PLAN (ADULT/PEDIATRIC) - CALL YOUR DOCTOR IF YOU HAVE ANY OF THE FOLLOWING:
Bleeding that does not stop/Swelling that gets worse/Pain not relieved by Medications/Fever greater than (need to indicate Fahrenheit or Celsius)/Numbness, tingling, color or temperature change to extremity/Excessive diarrhea/Inability to tolerate liquids or foods/Increased irritability or sluggishness

## 2024-04-10 NOTE — ASU DISCHARGE PLAN (ADULT/PEDIATRIC) - CARE PROVIDER_API CALL
Sam Leon  Gastroenterology  [address] [place] [address]  Phone: [phone]  Fax: [phone]  Follow Up Time:

## 2024-04-12 ENCOUNTER — OUTPATIENT (OUTPATIENT)
Dept: OUTPATIENT SERVICES | Facility: HOSPITAL | Age: 74
LOS: 1 days | End: 2024-04-12
Payer: MEDICARE

## 2024-04-12 ENCOUNTER — RESULT REVIEW (OUTPATIENT)
Age: 74
End: 2024-04-12

## 2024-04-12 DIAGNOSIS — Z90.49 ACQUIRED ABSENCE OF OTHER SPECIFIED PARTS OF DIGESTIVE TRACT: Chronic | ICD-10-CM

## 2024-04-12 DIAGNOSIS — Z00.8 ENCOUNTER FOR OTHER GENERAL EXAMINATION: ICD-10-CM

## 2024-04-12 DIAGNOSIS — R07.9 CHEST PAIN, UNSPECIFIED: ICD-10-CM

## 2024-04-12 LAB
SURGICAL PATHOLOGY STUDY: SIGNIFICANT CHANGE UP
SURGICAL PATHOLOGY STUDY: SIGNIFICANT CHANGE UP

## 2024-04-12 PROCEDURE — 71250 CT THORAX DX C-: CPT | Mod: 26,MH

## 2024-04-12 PROCEDURE — 71250 CT THORAX DX C-: CPT

## 2024-04-13 DIAGNOSIS — R07.9 CHEST PAIN, UNSPECIFIED: ICD-10-CM

## 2024-04-16 ENCOUNTER — RESULT REVIEW (OUTPATIENT)
Age: 74
End: 2024-04-16

## 2024-04-16 ENCOUNTER — APPOINTMENT (OUTPATIENT)
Dept: PULMONOLOGY | Facility: HOSPITAL | Age: 74
End: 2024-04-16

## 2024-04-16 ENCOUNTER — OUTPATIENT (OUTPATIENT)
Dept: OUTPATIENT SERVICES | Facility: HOSPITAL | Age: 74
LOS: 1 days | End: 2024-04-16
Payer: MEDICARE

## 2024-04-16 DIAGNOSIS — R91.8 OTHER NONSPECIFIC ABNORMAL FINDING OF LUNG FIELD: ICD-10-CM

## 2024-04-16 DIAGNOSIS — Z90.49 ACQUIRED ABSENCE OF OTHER SPECIFIED PARTS OF DIGESTIVE TRACT: Chronic | ICD-10-CM

## 2024-04-16 LAB — GLUCOSE BLDC GLUCOMTR-MCNC: 90 MG/DL — SIGNIFICANT CHANGE UP (ref 70–99)

## 2024-04-16 PROCEDURE — 78815 PET IMAGE W/CT SKULL-THIGH: CPT | Mod: 26,PI,MH

## 2024-04-16 PROCEDURE — 78815 PET IMAGE W/CT SKULL-THIGH: CPT | Mod: PI

## 2024-04-16 PROCEDURE — 82962 GLUCOSE BLOOD TEST: CPT

## 2024-04-16 PROCEDURE — A9552: CPT

## 2024-04-17 DIAGNOSIS — R91.8 OTHER NONSPECIFIC ABNORMAL FINDING OF LUNG FIELD: ICD-10-CM

## 2024-04-17 NOTE — ASU PATIENT PROFILE, ADULT - FALL HARM RISK - HARM RISK INTERVENTIONS
Assistance with ambulation/Assistance OOB with selected safe patient handling equipment/Communicate Risk of Fall with Harm to all staff/Discuss with provider need for PT consult/Monitor gait and stability/Reinforce activity limits and safety measures with patient and family/Tailored Fall Risk Interventions/Visual Cue: Yellow wristband and red socks/Bed in lowest position, wheels locked, appropriate side rails in place/Call bell, personal items and telephone in reach/Instruct patient to call for assistance before getting out of bed or chair/Non-slip footwear when patient is out of bed/[place] to call system/Physically safe environment - no spills, clutter or unnecessary equipment/Purposeful Proactive Rounding/Room/bathroom lighting operational, light cord in reach

## 2024-04-17 NOTE — ASU PATIENT PROFILE, ADULT - ABLE TO REACH PT
----- Message from April Nagle sent at 6/30/2023 11:17 AM EDT -----  Subject: Appointment Request    Reason for Call: New Patient/New to Provider Appointment needed: New   Patient Request Appointment    QUESTIONS    Reason for appointment request? Requested Provider unavailable - Dr. Luisa Gill      Additional Information for Provider? Patients parents tee and al lundberg are patients of dr. Will Rehman, and patient would like to establish   with him as well. no new patient appointments when I searched.  please call   patient back to advise if dr. Will Rehman can accept her, thank you   ---------------------------------------------------------------------------  --------------  [address]  [phone]; OK to leave message on voicemail  ---------------------------------------------------------------------------  --------------  SCRIPT ANSWERS
Spoke to patient informing her Dr. Venecia Aldana' patient panel is full and unable to take any new patients at this time. Patient expressed understanding.
You currently see Marilee Riddle - this is their daughter and she is requesting if she can establish care with you? Please review and advise. Thank you.
yes

## 2024-04-18 ENCOUNTER — TRANSCRIPTION ENCOUNTER (OUTPATIENT)
Age: 74
End: 2024-04-18

## 2024-04-18 ENCOUNTER — OUTPATIENT (OUTPATIENT)
Dept: OUTPATIENT SERVICES | Facility: HOSPITAL | Age: 74
LOS: 1 days | Discharge: HOME | End: 2024-04-18
Payer: MEDICARE

## 2024-04-18 ENCOUNTER — RESULT REVIEW (OUTPATIENT)
Age: 74
End: 2024-04-18

## 2024-04-18 VITALS
HEIGHT: 68 IN | WEIGHT: 156.97 LBS | SYSTOLIC BLOOD PRESSURE: 119 MMHG | DIASTOLIC BLOOD PRESSURE: 67 MMHG | OXYGEN SATURATION: 96 % | RESPIRATION RATE: 16 BRPM | HEART RATE: 62 BPM | TEMPERATURE: 98 F

## 2024-04-18 VITALS
DIASTOLIC BLOOD PRESSURE: 80 MMHG | OXYGEN SATURATION: 99 % | SYSTOLIC BLOOD PRESSURE: 135 MMHG | RESPIRATION RATE: 15 BRPM | TEMPERATURE: 98 F | HEART RATE: 56 BPM

## 2024-04-18 DIAGNOSIS — E11.9 TYPE 2 DIABETES MELLITUS WITHOUT COMPLICATIONS: ICD-10-CM

## 2024-04-18 DIAGNOSIS — Z12.11 ENCOUNTER FOR SCREENING FOR MALIGNANT NEOPLASM OF COLON: ICD-10-CM

## 2024-04-18 DIAGNOSIS — R91.8 OTHER NONSPECIFIC ABNORMAL FINDING OF LUNG FIELD: ICD-10-CM

## 2024-04-18 DIAGNOSIS — D12.2 BENIGN NEOPLASM OF ASCENDING COLON: ICD-10-CM

## 2024-04-18 DIAGNOSIS — Z79.82 LONG TERM (CURRENT) USE OF ASPIRIN: ICD-10-CM

## 2024-04-18 DIAGNOSIS — D12.3 BENIGN NEOPLASM OF TRANSVERSE COLON: ICD-10-CM

## 2024-04-18 DIAGNOSIS — K57.30 DIVERTICULOSIS OF LARGE INTESTINE WITHOUT PERFORATION OR ABSCESS WITHOUT BLEEDING: ICD-10-CM

## 2024-04-18 DIAGNOSIS — K64.4 RESIDUAL HEMORRHOIDAL SKIN TAGS: ICD-10-CM

## 2024-04-18 DIAGNOSIS — K29.50 UNSPECIFIED CHRONIC GASTRITIS WITHOUT BLEEDING: ICD-10-CM

## 2024-04-18 DIAGNOSIS — Z90.49 ACQUIRED ABSENCE OF OTHER SPECIFIED PARTS OF DIGESTIVE TRACT: Chronic | ICD-10-CM

## 2024-04-18 DIAGNOSIS — Z86.73 PERSONAL HISTORY OF TRANSIENT ISCHEMIC ATTACK (TIA), AND CEREBRAL INFARCTION WITHOUT RESIDUAL DEFICITS: ICD-10-CM

## 2024-04-18 DIAGNOSIS — Z88.0 ALLERGY STATUS TO PENICILLIN: ICD-10-CM

## 2024-04-18 DIAGNOSIS — K21.9 GASTRO-ESOPHAGEAL REFLUX DISEASE WITHOUT ESOPHAGITIS: ICD-10-CM

## 2024-04-18 DIAGNOSIS — Z88.2 ALLERGY STATUS TO SULFONAMIDES: ICD-10-CM

## 2024-04-18 DIAGNOSIS — M10.9 GOUT, UNSPECIFIED: ICD-10-CM

## 2024-04-18 DIAGNOSIS — I10 ESSENTIAL (PRIMARY) HYPERTENSION: ICD-10-CM

## 2024-04-18 DIAGNOSIS — G20.A1 PARKINSON'S DISEASE WITHOUT DYSKINESIA, WITHOUT MENTION OF FLUCTUATIONS: ICD-10-CM

## 2024-04-18 LAB — GLUCOSE BLDC GLUCOMTR-MCNC: 82 MG/DL — SIGNIFICANT CHANGE UP (ref 70–99)

## 2024-04-18 PROCEDURE — 88112 CYTOPATH CELL ENHANCE TECH: CPT | Mod: 59

## 2024-04-18 PROCEDURE — C9399: CPT

## 2024-04-18 PROCEDURE — 88312 SPECIAL STAINS GROUP 1: CPT | Mod: 26

## 2024-04-18 PROCEDURE — 71045 X-RAY EXAM CHEST 1 VIEW: CPT | Mod: 26

## 2024-04-18 PROCEDURE — 88312 SPECIAL STAINS GROUP 1: CPT

## 2024-04-18 PROCEDURE — 88305 TISSUE EXAM BY PATHOLOGIST: CPT

## 2024-04-18 PROCEDURE — S2900: CPT

## 2024-04-18 PROCEDURE — 31627 NAVIGATIONAL BRONCHOSCOPY: CPT

## 2024-04-18 PROCEDURE — 82962 GLUCOSE BLOOD TEST: CPT

## 2024-04-18 PROCEDURE — 31628 BRONCHOSCOPY/LUNG BX EACH: CPT | Mod: XU

## 2024-04-18 PROCEDURE — 88305 TISSUE EXAM BY PATHOLOGIST: CPT | Mod: 26

## 2024-04-18 PROCEDURE — 31629 BRONCHOSCOPY/NEEDLE BX EACH: CPT

## 2024-04-18 PROCEDURE — 71045 X-RAY EXAM CHEST 1 VIEW: CPT

## 2024-04-18 RX ORDER — LEVETIRACETAM 250 MG/1
500 TABLET, FILM COATED ORAL ONCE
Refills: 0 | Status: DISCONTINUED | OUTPATIENT
Start: 2024-04-18 | End: 2024-04-18

## 2024-04-18 RX ORDER — ACETAMINOPHEN 500 MG
1000 TABLET ORAL ONCE
Refills: 0 | Status: DISCONTINUED | OUTPATIENT
Start: 2024-04-18 | End: 2024-04-18

## 2024-04-18 NOTE — H&P PST ADULT - HISTORY OF PRESENT ILLNESS
74 y/o male with PMH of Parkinson's disease (diagnosed 7 years ago), HTN, 2 x CVA (1998, 2016, minimal residual), seizure (2019 - on keppra), incidental Rt lung nodule found on imaging presetning for robotic bronchoscopy with FNA and possible transbronchial bx.

## 2024-04-18 NOTE — ASU DISCHARGE PLAN (ADULT/PEDIATRIC) - CALL YOUR DOCTOR IF YOU HAVE ANY OF THE FOLLOWING:
Or worsening shortness of breath/Bleeding that does not stop/Fever greater than (need to indicate Fahrenheit or Celsius)/Inability to tolerate liquids or foods/Increased irritability or sluggishness

## 2024-04-18 NOTE — H&P PST ADULT - RESPIRATORY
normal/clear to auscultation bilaterally/no rales/no rhonchi/no use of accessory muscles/airway patent/good air movement

## 2024-04-18 NOTE — CHART NOTE - NSCHARTNOTEFT_GEN_A_CORE
PACU ANESTHESIA ADMISSION NOTE      Procedure: Robot-assisted bronchoscopy      __x__  Patent Airway    __x__  Full return of protective reflexes    __x__  Full recovery from anesthesia / back to baseline     Mental Status:  __x__ Awake   _____ Alert   _____ Drowsy   _____ Sedated    Nausea/Vomiting:  __x__ NO  ______Yes,   See Post - Op Orders          Pain Scale (0-10):  __0___    Treatment: ____ None    ___x_ See Post - Op/PCA Orders    Post - Operative Fluids:   ____ Oral   __x__ See Post - Op Orders    Plan: Discharge:   ____Home       __x___Floor     _____Critical Care    _____  Other:_________________    Comments:

## 2024-04-18 NOTE — ASU DISCHARGE PLAN (ADULT/PEDIATRIC) - CARE PROVIDER_API CALL
Lina Dempsey  Pulmonary Disease  [address] [place] [id number]  Phone: [phone]  Fax: [phone]  Follow Up Time: 2 weeks

## 2024-04-18 NOTE — PROCEDURE NOTE - NSBRONCHPROCDETAILS_GEN_A_CORE_FT
The procedure, indications, preparation and potential complications were explained to the patient, who indicated understanding and signed the corresponding consent forms. Pt was intubated for the Robotic-Assisted bronchoscopy (RAB). The procedure was performed for diagnostic purposes. Endotrachial tube was placed and The flexible bronchoscope of the RAB was introduced into the ETT and advanced under direct visualization until the nelson was reached.     Examination of the trachea, mainstem kristina, right and left mainstem bronchus was performed and found to be within NORMAL LIMITS.     The Flexiable bronchoscope was advanced into the right lung, where examination of the right upper middle and lower lobe was performed in all segments to the subsegmental level without endobronchial lesion identified.     The bronchoscope was advanced into the left lung, where examination of the left upper and lower lobe was performed in all segments to the subsegmental level without endobronchial lesion identified.    The flexiable bronchoscopy was advanced with the aid of navigation and introduced into the Upper left lung. Raidal US and fluoroscopy was used to confirm location of the lung nodule. a 21g and 23g was used and 5 FNA samples were obtained and sent for cytology. 4 biospies were also obtained via forceps under the guidance of fluroscopy and sent for the appropriate studies    The  bronchoscope was then withdrawn from the pt. The patient tolerated the procedure well.  
Patient intubated and sedated in the OR   Robotic Ion bronchoscope inserted in the ETT   Navigation successfully completed to the CORINNE nodule   Adequate radial signal obtained on endobronchial US   Fluoroscopy was used throughout   Using 21 G FNA needles; multiple FNA samples were obtained   Using radial forceps multiple biopsies were obtained   Robotic bronchoscope was then retracted   Regular fiberoptic bronchoscopy done with no endobronchial lesions   No bleeding noted   CXR ordered   Patient extubated succesfully and seen in PACU   Will follow outpatient

## 2024-04-18 NOTE — ASU PREOP CHECKLIST - 1.
Received patient in pre-op. Denies pain/discomfort. Safety precautions maintained. All questions and concerns were addressed.

## 2024-04-18 NOTE — PRE-ANESTHESIA EVALUATION ADULT - NSANTHDIETYNSD_GEN_ALL_CORE
Cryotherapy    What is it?    Use of a very cold liquid, such as liquid nitrogen, to freeze and destroy abnormal skin cells that need to be removed    What should I expect?    Tenderness and redness    A small blister that might grow and fill with dark purple blood. There may be crusting.    More than one treatment may be needed if the lesions do not go away.    How do I care for the treated area?    Gently wash the area with your hands when bathing.    Use a thin layer of Vaseline to help with healing. You may use a Band-Aid.     The area should heal within 7-10 days and may leave behind a pink or lighter color.     Do not use an antibiotic or Neosporin ointment.     You may take acetaminophen (Tylenol) for pain.     Call your Doctor if you have:    Severe pain    Signs of infection (warmth, redness, cloudy yellow drainage, and or a bad smell)    Questions or concerns    Who should I call with questions?       [facility]: [phone]       [facility]: [phone]       For urgent needs outside of business hours call the [facility] at [phone]        and ask for the dermatology resident on call  
Yes

## 2024-04-19 LAB — SURGICAL PATHOLOGY STUDY: SIGNIFICANT CHANGE UP

## 2024-04-23 LAB
NON-GYNECOLOGICAL CYTOLOGY STUDY: SIGNIFICANT CHANGE UP
NON-GYNECOLOGICAL CYTOLOGY STUDY: SIGNIFICANT CHANGE UP

## 2024-04-25 DIAGNOSIS — Z88.2 ALLERGY STATUS TO SULFONAMIDES: ICD-10-CM

## 2024-04-25 DIAGNOSIS — R91.1 SOLITARY PULMONARY NODULE: ICD-10-CM

## 2024-04-25 DIAGNOSIS — Z88.0 ALLERGY STATUS TO PENICILLIN: ICD-10-CM

## 2024-04-25 DIAGNOSIS — Z88.1 ALLERGY STATUS TO OTHER ANTIBIOTIC AGENTS STATUS: ICD-10-CM

## 2024-05-01 NOTE — ASU PATIENT PROFILE, ADULT - HEALTH/HEALTHCARE ANXIETIES, PROFILE
How Severe Are Your Spot(S)?: mild What Type Of Note Output Would You Prefer (Optional)?: Bullet Format none What Is The Reason For Today's Visit?: Full Body Skin Examination What Is The Reason For Today's Visit? (Being Monitored For X): concerning skin lesions on an annual basis

## 2024-05-07 ENCOUNTER — APPOINTMENT (OUTPATIENT)
Dept: PULMONOLOGY | Facility: CLINIC | Age: 74
End: 2024-05-07

## 2024-05-09 NOTE — ED ADULT TRIAGE NOTE - ESI TRIAGE ACUITY LEVEL, MLM
3 Showering allowed/Stairs allowed/Walking - Indoors allowed/Walking - Outdoors allowed/Follow Instructions Provided by your Surgical Team

## 2024-05-13 ENCOUNTER — APPOINTMENT (OUTPATIENT)
Dept: GASTROENTEROLOGY | Facility: CLINIC | Age: 74
End: 2024-05-13
Payer: MEDICARE

## 2024-05-13 ENCOUNTER — APPOINTMENT (OUTPATIENT)
Dept: PULMONOLOGY | Facility: CLINIC | Age: 74
End: 2024-05-13

## 2024-05-13 DIAGNOSIS — R93.3 ABNORMAL FINDINGS ON DIAGNOSTIC IMAGING OF OTHER PARTS OF DIGESTIVE TRACT: ICD-10-CM

## 2024-05-13 DIAGNOSIS — D12.6 BENIGN NEOPLASM OF COLON, UNSPECIFIED: ICD-10-CM

## 2024-05-13 PROCEDURE — 99443: CPT | Mod: 93

## 2024-05-13 NOTE — REASON FOR VISIT
[Home] : at home, [unfilled] , at the time of the visit. [Medical Office: ([facility])___] : at the medical office located in  [FreeTextEntry1] : Post EGD/COLON

## 2024-05-13 NOTE — ASSESSMENT
[facility]    [address] [place] [id number]    Phone:  [phone]    Fax:  [phone]       Thank You for choosing us for your health care visit. We are glad to serve you and happy to provide you with this summary of your visit. Please help us to ensure we have accurate records. If you find anything that needs to be changed, please let our staff know as soon as possible.          Your Demographic Information     Patient Name Sex     Irineo Nobles Male 1957       Ethnic Group Patient Race    Not of  or  Origin Black/      Your Visit Details     Date & Time Provider Department    2017 1:30 PM Beau Hermosillo MD [facility]      Your Upcoming Appointment*(Max 10)       1:15 PM CDT   Follow-up Visit with Beau Hermosillo MD   [facility] ([facility])    [address] [place] [id number] [phone]              Your To Do List     Future Orders Please Complete On or Around Expires    ELECTROCARDIOGRAM 12-LEAD      Follow-Up    Return in about 6 months (around 2017).      Conditions Discussed Today or Order-Related Diagnoses        Comments    Palpitations    -  Primary     Acute myocardial infarction involving other coronary artery           Your Vitals Were     BP Pulse Height Weight SpO2 BMI    120/70 105 5' 4.5\" (1.638 m) 230 lb (104.3 kg) 95% 38.87 kg/m2    Smoking Status                   Former Smoker           Medications Prescribed or Re-Ordered Today     aspirin 81 MG tablet    Sig - Route: Take 1 tablet by mouth nightly. - Oral    Class: Eprescribe    [facility] [id number] [place] [address] AT [address] [place] [address] Ph #: [phone]      Your Current Medications Are        Disp Refills Start End    aspirin 81 MG  [FreeTextEntry1] : high risk adenoma of the colon Unremarkable EGD Constipation  Plan: surveillance colonoscopy in 3 years Continue Miralax tablet 90 tablet 3 1/12/2017     Sig - Route: Take 1 tablet by mouth nightly. - Oral    Class: Eprescribe    Sertraline HCl (ZOLOFT PO)        Class: Historical Med    Route: Oral    metoPROLOL (LOPRESSOR) 50 MG tablet 180 tablet 3 12/29/2016     Sig - Route: Take 1 tablet by mouth 2 times daily. - Oral    Class: Eprescribe    Cosign for Ordering: Accepted by Beau Hermosillo MD on 12/29/2016  4:24 PM    ATORVASTATIN CALCIUM PO        Class: Historical Med    Route: Oral    METFORMIN HCL PO        Sig - Route: Take by mouth 2 times daily.  - Oral    Class: Historical Med    nitroGLYcerin (NITROSTAT) 0.4 MG SL tablet 25 tablet 11 2/11/2016 2/11/2017    Sig - Route: Place 1 tablet under the tongue every 5 minutes as needed for Chest pain. - Sublingual    Class: Eprescribe    LEVOTHYROXINE SODIUM PO        Sig - Route: Take  by mouth daily. - Oral    Class: Historical Med    CLONAZEPAM PO        Sig - Route: Take  by mouth. - Oral    Class: Historical Med    timolol (TIMOPTIC) 0.5 % ophthalmic solution        Sig - Route: Place 1 drop into both eyes daily. - Both Eyes    Class: Historical Med    cyclobenzaprine (FLEXERIL) 10 MG tablet        Sig - Route: Take 10 mg by mouth 2 times daily as needed.   - Oral    Class: Historical Med      Allergies     No Known Allergies      Immunizations History as of 1/12/2017     Name Date    Influenza 11/17/2011, 10/26/2010, 10/27/2009, 10/22/2008    Influenza A novel H1N1 2/27/2010      Problem List as of 1/12/2017     Acute myocardial infarction            Patient Instructions     None

## 2024-05-13 NOTE — ASSESSMENT
[FreeTextEntry1] : Patient is a 74 y/o male with PMH of Parkinson's disease (diagnosed  7 years ago, HTN, 2 x CVA (1998, 2016), DM, Seizure (2019) on keppra, H/O Gout, H/O lap band surgery with removal about 2016, presented to the ED after 2 x falls 2/24. Patient states that he woke up around 0500 went to the bathroom and fell in the kitchen. He was unable to recall if he lost consciousness but was able to get back up and go back to the bedroom. When getting back up from bed, he fell again and was not able to get up without assistance. He cannot recall if he felt dizzy or lost consciousness He states that he was unable to move his legs. He endorses some mid-back tenderness attributed to the fall and tremor of his left hand attributed to Parkinson's. He also endorses constant mild vertigo has improved since last admission in 06/2023.   CBC, CMP, PT INR was unremarkable. Trauma w/u was negative for acute traumatic injury. (CT chest, CT abdomen/pelvis, CT c-spine, CT head, xray pelvis, xray chest). CT chest showed incidental finding of 1.8 cm left apical lung mass, suspicious for malignancy-new from neck CT of 6/1/2023 (Pulmonary appt pending) CT abdomen showed: 2 rounded foci in the transverse colon. Differential includes adherent stool versus colonic lesions.  GI is consulted for evaluation. Patient reports never having a colonoscopy done before.  He reported a poor appetite since he had Covid-19 2/24 with about a 10 lb weight loss since then. He reported a H/O constipation which is currently controlled by senna and miralax q day. He denied abdominal pain, heartburn, dysphagia, melena, blood in the stool, vomiting, or diarrhea.       Abnormal CT scan of Abdomen, 2 foci in the transverse colon, possible lesion, R/O colon cancer CRC Screening - Will schedule a colonoscopy; risks and benefits discussed - F/U after the procedure is done  Constipation - Continue Senna and Miralax OD

## 2024-05-13 NOTE — HISTORY OF PRESENT ILLNESS
[FreeTextEntry1] : Patient is a 74 y/o male with PMH of Parkinson's disease (diagnosed  7 years ago, HTN, 2 x CVA (1998, 2016), DM, Seizure (2019) on keppra, H/O Gout, H/O lap band surgery with removal about 2016, presented to the ED after 2 x falls 2/24. Patient states that he woke up around 0500 went to the bathroom and fell in the kitchen. He was unable to recall if he lost consciousness but was able to get back up and go back to the bedroom. When getting back up from bed, he fell again and was not able to get up without assistance. He cannot recall if he felt dizzy or lost consciousness He states that he was unable to move his legs. He endorses some mid-back tenderness attributed to the fall and tremor of his left hand attributed to Parkinson's. He also endorses constant mild vertigo has improved since last admission in 06/2023.   CBC, CMP, PT INR was unremarkable. Trauma w/u was negative for acute traumatic injury. (CT chest, CT abdomen/pelvis, CT c-spine, CT head, xray pelvis, xray chest). CT chest showed incidental finding of 1.8 cm left apical lung mass, suspicious for malignancy-new from neck CT of 6/1/2023 (Pulmonary appt pending) CT abdomen showed: 2 rounded foci in the transverse colon. Differential includes adherent stool versus colonic lesions.  GI is consulted for evaluation. Patient reports never having a colonoscopy done before.  He reported a poor appetite since he had Covid-19 2/24 with about a 10 lb weight loss since then. He reported a H/O constipation which is currently controlled by senna and miralax q day. He denied abdominal pain, heartburn, dysphagia, melena, blood in the stool, vomiting, or diarrhea.

## 2024-05-13 NOTE — HISTORY OF PRESENT ILLNESS
[Home] : at home, [unfilled] , at the time of the visit. [Other Location: e.g. School (Enter Location, City,State)___] : at [unfilled], at the time of the visit. [Medical Office: ([facility])___] : at the medical office located in  [Friend] : friend [Other:____] : [unfilled] [Verbal consent obtained from patient] : the patient, [unfilled] [de-identified] : 04/10/24 [FreeTextEntry1] : 04/10/24

## 2024-07-03 NOTE — PHYSICAL THERAPY INITIAL EVALUATION ADULT - LEVEL OF INDEPENDENCE: STAND/SIT, REHAB EVAL
Provider Staff:  Action required. This patient has received emergency care.     Please schedule patient for a follow up appointment.     Thanks!  [facility]     Appointments      Date Provider   Last Visit   1/18/2024 Erika Rios MD   Next Visit   Visit date not found Erika Rios MD            contact guard

## 2024-09-05 NOTE — ED ADULT TRIAGE NOTE - RESPIRATORY RATE (BREATHS/MIN)
Last labs 8/15/23 TSH, Lipids, CMP, CBCD will she come for appt if she gets the labs?  Pharmacy requested refill on another encounter   18

## 2024-09-10 NOTE — DIETITIAN INITIAL EVALUATION ADULT - MALNUTRITION
ATORVASTATIN THAT WAS SENT IN YESTERDAY IS OVER DAILY MAX FOR INSURANCE TO COVER.    MAXIMUM DAILY DOSE IS ONE TABLET.  PLEASE CHANGE TO 40 MG TABLET.  
Severe, acute

## 2024-09-24 ENCOUNTER — INPATIENT (INPATIENT)
Facility: HOSPITAL | Age: 74
LOS: 3 days | Discharge: HOME | DRG: 690 | End: 2024-09-28
Attending: STUDENT IN AN ORGANIZED HEALTH CARE EDUCATION/TRAINING PROGRAM | Admitting: INTERNAL MEDICINE
Payer: MEDICARE

## 2024-09-24 VITALS — WEIGHT: 151.02 LBS

## 2024-09-24 DIAGNOSIS — N39.0 URINARY TRACT INFECTION, SITE NOT SPECIFIED: ICD-10-CM

## 2024-09-24 DIAGNOSIS — Z90.49 ACQUIRED ABSENCE OF OTHER SPECIFIED PARTS OF DIGESTIVE TRACT: Chronic | ICD-10-CM

## 2024-09-24 LAB
ALBUMIN SERPL ELPH-MCNC: 3.3 G/DL — LOW (ref 3.5–5.2)
ALP SERPL-CCNC: 94 U/L — SIGNIFICANT CHANGE UP (ref 30–115)
ALT FLD-CCNC: <5 U/L — SIGNIFICANT CHANGE UP (ref 0–41)
ANION GAP SERPL CALC-SCNC: 9 MMOL/L — SIGNIFICANT CHANGE UP (ref 7–14)
APPEARANCE UR: ABNORMAL
AST SERPL-CCNC: 12 U/L — SIGNIFICANT CHANGE UP (ref 0–41)
BASOPHILS # BLD AUTO: 0.02 K/UL — SIGNIFICANT CHANGE UP (ref 0–0.2)
BASOPHILS NFR BLD AUTO: 0.3 % — SIGNIFICANT CHANGE UP (ref 0–1)
BILIRUB SERPL-MCNC: 0.5 MG/DL — SIGNIFICANT CHANGE UP (ref 0.2–1.2)
BILIRUB UR-MCNC: NEGATIVE — SIGNIFICANT CHANGE UP
BUN SERPL-MCNC: 14 MG/DL — SIGNIFICANT CHANGE UP (ref 10–20)
CALCIUM SERPL-MCNC: 8.4 MG/DL — SIGNIFICANT CHANGE UP (ref 8.4–10.5)
CHLORIDE SERPL-SCNC: 108 MMOL/L — SIGNIFICANT CHANGE UP (ref 98–110)
CO2 SERPL-SCNC: 26 MMOL/L — SIGNIFICANT CHANGE UP (ref 17–32)
COLOR SPEC: ABNORMAL
CREAT SERPL-MCNC: 1 MG/DL — SIGNIFICANT CHANGE UP (ref 0.7–1.5)
DIFF PNL FLD: NEGATIVE — SIGNIFICANT CHANGE UP
EGFR: 80 ML/MIN/1.73M2 — SIGNIFICANT CHANGE UP
EGFR: 80 ML/MIN/1.73M2 — SIGNIFICANT CHANGE UP
EOSINOPHIL # BLD AUTO: 0.07 K/UL — SIGNIFICANT CHANGE UP (ref 0–0.7)
EOSINOPHIL NFR BLD AUTO: 0.9 % — SIGNIFICANT CHANGE UP (ref 0–8)
GLUCOSE BLDC GLUCOMTR-MCNC: 114 MG/DL — HIGH (ref 70–99)
GLUCOSE SERPL-MCNC: 124 MG/DL — HIGH (ref 70–99)
GLUCOSE UR QL: NEGATIVE MG/DL — SIGNIFICANT CHANGE UP
HCT VFR BLD CALC: 38.5 % — LOW (ref 42–52)
HGB BLD-MCNC: 13.4 G/DL — LOW (ref 14–18)
IMM GRANULOCYTES NFR BLD AUTO: 0.4 % — HIGH (ref 0.1–0.3)
KETONES UR-MCNC: ABNORMAL MG/DL
LEUKOCYTE ESTERASE UR-ACNC: ABNORMAL
LYMPHOCYTES # BLD AUTO: 1.7 K/UL — SIGNIFICANT CHANGE UP (ref 1.2–3.4)
LYMPHOCYTES # BLD AUTO: 21.5 % — SIGNIFICANT CHANGE UP (ref 20.5–51.1)
MCHC RBC-ENTMCNC: 32.8 PG — HIGH (ref 27–31)
MCHC RBC-ENTMCNC: 34.8 G/DL — SIGNIFICANT CHANGE UP (ref 32–37)
MCV RBC AUTO: 94.4 FL — HIGH (ref 80–94)
MONOCYTES # BLD AUTO: 0.96 K/UL — HIGH (ref 0.1–0.6)
MONOCYTES NFR BLD AUTO: 12.2 % — HIGH (ref 1.7–9.3)
NEUTROPHILS # BLD AUTO: 5.12 K/UL — SIGNIFICANT CHANGE UP (ref 1.4–6.5)
NEUTROPHILS NFR BLD AUTO: 64.7 % — SIGNIFICANT CHANGE UP (ref 42.2–75.2)
NITRITE UR-MCNC: NEGATIVE — SIGNIFICANT CHANGE UP
NRBC # BLD: 0 /100 WBCS — SIGNIFICANT CHANGE UP (ref 0–0)
NRBC BLD-RTO: 0 /100 WBCS — SIGNIFICANT CHANGE UP (ref 0–0)
PH UR: 5.5 — SIGNIFICANT CHANGE UP (ref 5–8)
PLATELET # BLD AUTO: 139 K/UL — SIGNIFICANT CHANGE UP (ref 130–400)
PMV BLD: 9.4 FL — SIGNIFICANT CHANGE UP (ref 7.4–10.4)
POTASSIUM SERPL-MCNC: 5.1 MMOL/L — HIGH (ref 3.5–5)
POTASSIUM SERPL-SCNC: 5.1 MMOL/L — HIGH (ref 3.5–5)
PROT SERPL-MCNC: 5.8 G/DL — LOW (ref 6–8)
PROT UR-MCNC: SIGNIFICANT CHANGE UP MG/DL
RBC # BLD: 4.08 M/UL — LOW (ref 4.7–6.1)
RBC # FLD: 12.9 % — SIGNIFICANT CHANGE UP (ref 11.5–14.5)
SODIUM SERPL-SCNC: 143 MMOL/L — SIGNIFICANT CHANGE UP (ref 135–146)
SP GR SPEC: 1.02 — SIGNIFICANT CHANGE UP (ref 1–1.03)
UROBILINOGEN FLD QL: 0.2 MG/DL — SIGNIFICANT CHANGE UP (ref 0.2–1)
WBC # BLD: 7.9 K/UL — SIGNIFICANT CHANGE UP (ref 4.8–10.8)
WBC # FLD AUTO: 7.9 K/UL — SIGNIFICANT CHANGE UP (ref 4.8–10.8)

## 2024-09-24 PROCEDURE — 99222 1ST HOSP IP/OBS MODERATE 55: CPT

## 2024-09-24 PROCEDURE — 85025 COMPLETE CBC W/AUTO DIFF WBC: CPT

## 2024-09-24 PROCEDURE — 85027 COMPLETE CBC AUTOMATED: CPT

## 2024-09-24 PROCEDURE — 87086 URINE CULTURE/COLONY COUNT: CPT

## 2024-09-24 PROCEDURE — 83036 HEMOGLOBIN GLYCOSYLATED A1C: CPT

## 2024-09-24 PROCEDURE — 36415 COLL VENOUS BLD VENIPUNCTURE: CPT

## 2024-09-24 PROCEDURE — 74176 CT ABD & PELVIS W/O CONTRAST: CPT | Mod: 26,MC

## 2024-09-24 PROCEDURE — 99285 EMERGENCY DEPT VISIT HI MDM: CPT

## 2024-09-24 PROCEDURE — 76775 US EXAM ABDO BACK WALL LIM: CPT

## 2024-09-24 PROCEDURE — 87077 CULTURE AEROBIC IDENTIFY: CPT

## 2024-09-24 PROCEDURE — 80048 BASIC METABOLIC PNL TOTAL CA: CPT

## 2024-09-24 PROCEDURE — 82962 GLUCOSE BLOOD TEST: CPT

## 2024-09-24 PROCEDURE — 87186 SC STD MICRODIL/AGAR DIL: CPT

## 2024-09-24 RX ORDER — DEXTROSE 50 % IN WATER 50 %
25 SYRINGE (ML) INTRAVENOUS ONCE
Refills: 0 | Status: DISCONTINUED | OUTPATIENT
Start: 2024-09-24 | End: 2024-09-28

## 2024-09-24 RX ORDER — SODIUM CHLORIDE 9 G/1000ML
1000 INJECTION, SOLUTION INTRAVENOUS ONCE
Refills: 0 | Status: COMPLETED | OUTPATIENT
Start: 2024-09-24 | End: 2024-09-24

## 2024-09-24 RX ORDER — SODIUM CHLORIDE 9 G/1000ML
1000 INJECTION, SOLUTION INTRAVENOUS
Refills: 0 | Status: DISCONTINUED | OUTPATIENT
Start: 2024-09-24 | End: 2024-09-28

## 2024-09-24 RX ORDER — LIDOCAINE HYDROCHLORIDE 20 MG/ML
1 JELLY TOPICAL ONCE
Refills: 0 | Status: COMPLETED | OUTPATIENT
Start: 2024-09-24 | End: 2024-09-24

## 2024-09-24 RX ORDER — IBUPROFEN 200 MG
400 TABLET ORAL ONCE
Refills: 0 | Status: COMPLETED | OUTPATIENT
Start: 2024-09-24 | End: 2024-09-24

## 2024-09-24 RX ORDER — CEFTRIAXONE 500 MG/1
1000 INJECTION, POWDER, FOR SOLUTION INTRAMUSCULAR; INTRAVENOUS ONCE
Refills: 0 | Status: COMPLETED | OUTPATIENT
Start: 2024-09-24 | End: 2024-09-24

## 2024-09-24 RX ORDER — LEVETIRACETAM 10 MG/ML
500 INJECTION, SOLUTION INTRAVENOUS ONCE
Refills: 0 | Status: COMPLETED | OUTPATIENT
Start: 2024-09-24 | End: 2024-09-24

## 2024-09-24 RX ORDER — INSULIN LISPRO 100 U/ML
INJECTION, SOLUTION INTRAVENOUS; SUBCUTANEOUS
Refills: 0 | Status: DISCONTINUED | OUTPATIENT
Start: 2024-09-24 | End: 2024-09-28

## 2024-09-24 RX ORDER — SODIUM CHLORIDE 9 G/1000ML
1000 INJECTION, SOLUTION INTRAVENOUS
Refills: 0 | Status: DISCONTINUED | OUTPATIENT
Start: 2024-09-24 | End: 2024-09-26

## 2024-09-24 RX ORDER — DEXTROSE 50 % IN WATER 50 %
15 SYRINGE (ML) INTRAVENOUS ONCE
Refills: 0 | Status: DISCONTINUED | OUTPATIENT
Start: 2024-09-24 | End: 2024-09-28

## 2024-09-24 RX ORDER — METHOCARBAMOL 500 MG/1
1000 TABLET, FILM COATED ORAL ONCE
Refills: 0 | Status: COMPLETED | OUTPATIENT
Start: 2024-09-24 | End: 2024-09-24

## 2024-09-24 RX ORDER — GLUCAGON 3 MG/1
1 POWDER NASAL ONCE
Refills: 0 | Status: DISCONTINUED | OUTPATIENT
Start: 2024-09-24 | End: 2024-09-28

## 2024-09-24 RX ORDER — DEXTROSE 50 % IN WATER 50 %
12.5 SYRINGE (ML) INTRAVENOUS ONCE
Refills: 0 | Status: DISCONTINUED | OUTPATIENT
Start: 2024-09-24 | End: 2024-09-28

## 2024-09-24 RX ORDER — GABAPENTIN 400 MG/1
100 CAPSULE ORAL ONCE
Refills: 0 | Status: COMPLETED | OUTPATIENT
Start: 2024-09-24 | End: 2024-09-24

## 2024-09-24 RX ORDER — TRAZODONE HCL 100 MG
100 TABLET ORAL ONCE
Refills: 0 | Status: COMPLETED | OUTPATIENT
Start: 2024-09-24 | End: 2024-09-24

## 2024-09-24 RX ORDER — CEFTRIAXONE 500 MG/1
1000 INJECTION, POWDER, FOR SOLUTION INTRAMUSCULAR; INTRAVENOUS EVERY 24 HOURS
Refills: 0 | Status: DISCONTINUED | OUTPATIENT
Start: 2024-09-24 | End: 2024-09-27

## 2024-09-24 RX ORDER — CARBIDOPA/LEVODOPA 25MG-100MG
1 TABLET ORAL ONCE
Refills: 0 | Status: COMPLETED | OUTPATIENT
Start: 2024-09-24 | End: 2024-09-24

## 2024-09-24 RX ADMIN — Medication 400 MILLIGRAM(S): at 17:03

## 2024-09-24 RX ADMIN — LEVETIRACETAM 500 MILLIGRAM(S): 10 INJECTION, SOLUTION INTRAVENOUS at 23:55

## 2024-09-24 RX ADMIN — Medication 100 MILLIGRAM(S): at 23:55

## 2024-09-24 RX ADMIN — LIDOCAINE HYDROCHLORIDE 1 PATCH: 20 JELLY TOPICAL at 17:03

## 2024-09-24 RX ADMIN — SODIUM CHLORIDE 100 MILLILITER(S): 9 INJECTION, SOLUTION INTRAVENOUS at 22:14

## 2024-09-24 RX ADMIN — Medication 400 MILLIGRAM(S): at 18:30

## 2024-09-24 RX ADMIN — METHOCARBAMOL 1000 MILLIGRAM(S): 500 TABLET, FILM COATED ORAL at 17:04

## 2024-09-24 RX ADMIN — CEFTRIAXONE 100 MILLIGRAM(S): 500 INJECTION, POWDER, FOR SOLUTION INTRAMUSCULAR; INTRAVENOUS at 21:11

## 2024-09-24 RX ADMIN — Medication 1 TABLET(S): at 23:55

## 2024-09-24 RX ADMIN — SODIUM CHLORIDE 1000 MILLILITER(S): 9 INJECTION, SOLUTION INTRAVENOUS at 19:37

## 2024-09-24 RX ADMIN — LIDOCAINE HYDROCHLORIDE 1 PATCH: 20 JELLY TOPICAL at 19:00

## 2024-09-24 RX ADMIN — GABAPENTIN 100 MILLIGRAM(S): 400 CAPSULE ORAL at 23:55

## 2024-09-25 LAB
A1C WITH ESTIMATED AVERAGE GLUCOSE RESULT: 5.3 % — SIGNIFICANT CHANGE UP (ref 4–5.6)
ANION GAP SERPL CALC-SCNC: 14 MMOL/L — SIGNIFICANT CHANGE UP (ref 7–14)
BUN SERPL-MCNC: 9 MG/DL — LOW (ref 10–20)
CALCIUM SERPL-MCNC: 8.6 MG/DL — SIGNIFICANT CHANGE UP (ref 8.4–10.5)
CHLORIDE SERPL-SCNC: 107 MMOL/L — SIGNIFICANT CHANGE UP (ref 98–110)
CO2 SERPL-SCNC: 25 MMOL/L — SIGNIFICANT CHANGE UP (ref 17–32)
CREAT SERPL-MCNC: 0.9 MG/DL — SIGNIFICANT CHANGE UP (ref 0.7–1.5)
EGFR: 91 ML/MIN/1.73M2 — SIGNIFICANT CHANGE UP
EGFR: 91 ML/MIN/1.73M2 — SIGNIFICANT CHANGE UP
ESTIMATED AVERAGE GLUCOSE: 105 MG/DL — SIGNIFICANT CHANGE UP (ref 68–114)
GLUCOSE BLDC GLUCOMTR-MCNC: 128 MG/DL — HIGH (ref 70–99)
GLUCOSE BLDC GLUCOMTR-MCNC: 140 MG/DL — HIGH (ref 70–99)
GLUCOSE BLDC GLUCOMTR-MCNC: 162 MG/DL — HIGH (ref 70–99)
GLUCOSE BLDC GLUCOMTR-MCNC: 93 MG/DL — SIGNIFICANT CHANGE UP (ref 70–99)
GLUCOSE SERPL-MCNC: 78 MG/DL — SIGNIFICANT CHANGE UP (ref 70–99)
HCT VFR BLD CALC: 39.5 % — LOW (ref 42–52)
HGB BLD-MCNC: 13.5 G/DL — LOW (ref 14–18)
MCHC RBC-ENTMCNC: 32.8 PG — HIGH (ref 27–31)
MCHC RBC-ENTMCNC: 34.2 G/DL — SIGNIFICANT CHANGE UP (ref 32–37)
MCV RBC AUTO: 95.9 FL — HIGH (ref 80–94)
NRBC # BLD: 0 /100 WBCS — SIGNIFICANT CHANGE UP (ref 0–0)
NRBC BLD-RTO: 0 /100 WBCS — SIGNIFICANT CHANGE UP (ref 0–0)
PLATELET # BLD AUTO: 141 K/UL — SIGNIFICANT CHANGE UP (ref 130–400)
PMV BLD: 9.4 FL — SIGNIFICANT CHANGE UP (ref 7.4–10.4)
POTASSIUM SERPL-MCNC: 4.6 MMOL/L — SIGNIFICANT CHANGE UP (ref 3.5–5)
POTASSIUM SERPL-SCNC: 4.6 MMOL/L — SIGNIFICANT CHANGE UP (ref 3.5–5)
RBC # BLD: 4.12 M/UL — LOW (ref 4.7–6.1)
RBC # FLD: 12.9 % — SIGNIFICANT CHANGE UP (ref 11.5–14.5)
SODIUM SERPL-SCNC: 146 MMOL/L — SIGNIFICANT CHANGE UP (ref 135–146)
WBC # BLD: 8.15 K/UL — SIGNIFICANT CHANGE UP (ref 4.8–10.8)
WBC # FLD AUTO: 8.15 K/UL — SIGNIFICANT CHANGE UP (ref 4.8–10.8)

## 2024-09-25 PROCEDURE — 99233 SBSQ HOSP IP/OBS HIGH 50: CPT

## 2024-09-25 PROCEDURE — 76775 US EXAM ABDO BACK WALL LIM: CPT | Mod: 26

## 2024-09-25 PROCEDURE — 99221 1ST HOSP IP/OBS SF/LOW 40: CPT

## 2024-09-25 RX ORDER — RIVASTIGMINE 13.3 MG/24H
1 PATCH, EXTENDED RELEASE TRANSDERMAL EVERY 24 HOURS
Refills: 0 | Status: DISCONTINUED | OUTPATIENT
Start: 2024-09-25 | End: 2024-09-28

## 2024-09-25 RX ORDER — MELATONIN 5 MG
3 TABLET ORAL AT BEDTIME
Refills: 0 | Status: DISCONTINUED | OUTPATIENT
Start: 2024-09-25 | End: 2024-09-28

## 2024-09-25 RX ORDER — ENTACAPONE 200 MG/1
200 TABLET, FILM COATED ORAL
Refills: 0 | Status: DISCONTINUED | OUTPATIENT
Start: 2024-09-25 | End: 2024-09-28

## 2024-09-25 RX ORDER — TAMSULOSIN HYDROCHLORIDE 0.4 MG/1
0.4 CAPSULE ORAL AT BEDTIME
Refills: 0 | Status: DISCONTINUED | OUTPATIENT
Start: 2024-09-25 | End: 2024-09-28

## 2024-09-25 RX ORDER — GABAPENTIN 400 MG/1
100 CAPSULE ORAL AT BEDTIME
Refills: 0 | Status: DISCONTINUED | OUTPATIENT
Start: 2024-09-25 | End: 2024-09-28

## 2024-09-25 RX ORDER — ASPIRIN 325 MG
81 TABLET ORAL DAILY
Refills: 0 | Status: DISCONTINUED | OUTPATIENT
Start: 2024-09-25 | End: 2024-09-28

## 2024-09-25 RX ORDER — AMLODIPINE BESYLATE 10 MG/1
5 TABLET ORAL DAILY
Refills: 0 | Status: DISCONTINUED | OUTPATIENT
Start: 2024-09-25 | End: 2024-09-28

## 2024-09-25 RX ORDER — LIDOCAINE HYDROCHLORIDE 20 MG/ML
1 JELLY TOPICAL DAILY
Refills: 0 | Status: DISCONTINUED | OUTPATIENT
Start: 2024-09-25 | End: 2024-09-28

## 2024-09-25 RX ORDER — CARBIDOPA/LEVODOPA 25MG-100MG
2 TABLET ORAL
Refills: 0 | Status: DISCONTINUED | OUTPATIENT
Start: 2024-09-25 | End: 2024-09-28

## 2024-09-25 RX ORDER — ACETAMINOPHEN 500 MG/5ML
650 LIQUID (ML) ORAL EVERY 6 HOURS
Refills: 0 | Status: DISCONTINUED | OUTPATIENT
Start: 2024-09-25 | End: 2024-09-28

## 2024-09-25 RX ORDER — POLYETHYLENE GLYCOL 3350 17 G/17G
17 POWDER, FOR SOLUTION ORAL
Refills: 0 | Status: DISCONTINUED | OUTPATIENT
Start: 2024-09-25 | End: 2024-09-28

## 2024-09-25 RX ORDER — TRAZODONE HCL 100 MG
100 TABLET ORAL AT BEDTIME
Refills: 0 | Status: DISCONTINUED | OUTPATIENT
Start: 2024-09-25 | End: 2024-09-28

## 2024-09-25 RX ORDER — LEVETIRACETAM 10 MG/ML
500 INJECTION, SOLUTION INTRAVENOUS
Refills: 0 | Status: DISCONTINUED | OUTPATIENT
Start: 2024-09-25 | End: 2024-09-28

## 2024-09-25 RX ORDER — CARBIDOPA/LEVODOPA 25MG-100MG
1 TABLET ORAL
Refills: 0 | Status: DISCONTINUED | OUTPATIENT
Start: 2024-09-25 | End: 2024-09-28

## 2024-09-25 RX ORDER — SENNA 187 MG
2 TABLET ORAL AT BEDTIME
Refills: 0 | Status: DISCONTINUED | OUTPATIENT
Start: 2024-09-25 | End: 2024-09-28

## 2024-09-25 RX ORDER — CARVEDILOL 3.12 MG/1
3.12 TABLET, FILM COATED ORAL EVERY 12 HOURS
Refills: 0 | Status: DISCONTINUED | OUTPATIENT
Start: 2024-09-25 | End: 2024-09-28

## 2024-09-25 RX ORDER — TAMSULOSIN HYDROCHLORIDE 0.4 MG/1
0.4 CAPSULE ORAL AT BEDTIME
Refills: 0 | Status: DISCONTINUED | OUTPATIENT
Start: 2024-09-25 | End: 2024-09-25

## 2024-09-25 RX ADMIN — GABAPENTIN 100 MILLIGRAM(S): 400 CAPSULE ORAL at 21:11

## 2024-09-25 RX ADMIN — Medication 2 TABLET(S): at 21:12

## 2024-09-25 RX ADMIN — LIDOCAINE HYDROCHLORIDE 1 PATCH: 20 JELLY TOPICAL at 21:15

## 2024-09-25 RX ADMIN — Medication 2 TABLET(S): at 17:54

## 2024-09-25 RX ADMIN — Medication 1 TABLET(S): at 21:15

## 2024-09-25 RX ADMIN — Medication 2 TABLET(S): at 06:13

## 2024-09-25 RX ADMIN — Medication 100 MILLIGRAM(S): at 21:11

## 2024-09-25 RX ADMIN — Medication 3 MILLIGRAM(S): at 21:11

## 2024-09-25 RX ADMIN — RIVASTIGMINE 1 PATCH: 13.3 PATCH, EXTENDED RELEASE TRANSDERMAL at 07:27

## 2024-09-25 RX ADMIN — CEFTRIAXONE 100 MILLIGRAM(S): 500 INJECTION, POWDER, FOR SOLUTION INTRAMUSCULAR; INTRAVENOUS at 21:12

## 2024-09-25 RX ADMIN — ENTACAPONE 200 MILLIGRAM(S): 200 TABLET, FILM COATED ORAL at 17:50

## 2024-09-25 RX ADMIN — RIVASTIGMINE 1 PATCH: 13.3 PATCH, EXTENDED RELEASE TRANSDERMAL at 05:02

## 2024-09-25 RX ADMIN — LIDOCAINE HYDROCHLORIDE 1 PATCH: 20 JELLY TOPICAL at 04:58

## 2024-09-25 RX ADMIN — LEVETIRACETAM 500 MILLIGRAM(S): 10 INJECTION, SOLUTION INTRAVENOUS at 17:50

## 2024-09-25 RX ADMIN — TAMSULOSIN HYDROCHLORIDE 0.4 MILLIGRAM(S): 0.4 CAPSULE ORAL at 21:11

## 2024-09-25 RX ADMIN — CARVEDILOL 3.12 MILLIGRAM(S): 3.12 TABLET, FILM COATED ORAL at 17:50

## 2024-09-25 RX ADMIN — ENTACAPONE 200 MILLIGRAM(S): 200 TABLET, FILM COATED ORAL at 06:13

## 2024-09-25 RX ADMIN — LEVETIRACETAM 500 MILLIGRAM(S): 10 INJECTION, SOLUTION INTRAVENOUS at 05:03

## 2024-09-25 RX ADMIN — ENTACAPONE 200 MILLIGRAM(S): 200 TABLET, FILM COATED ORAL at 11:49

## 2024-09-25 RX ADMIN — LIDOCAINE HYDROCHLORIDE 1 PATCH: 20 JELLY TOPICAL at 23:45

## 2024-09-25 RX ADMIN — Medication 2 TABLET(S): at 11:51

## 2024-09-25 RX ADMIN — POLYETHYLENE GLYCOL 3350 17 GRAM(S): 17 POWDER, FOR SOLUTION ORAL at 21:12

## 2024-09-25 RX ADMIN — AMLODIPINE BESYLATE 5 MILLIGRAM(S): 10 TABLET ORAL at 05:03

## 2024-09-25 RX ADMIN — Medication 100 MILLIGRAM(S): at 11:50

## 2024-09-25 RX ADMIN — LIDOCAINE HYDROCHLORIDE 1 PATCH: 20 JELLY TOPICAL at 11:49

## 2024-09-25 RX ADMIN — Medication 81 MILLIGRAM(S): at 11:49

## 2024-09-25 RX ADMIN — CARVEDILOL 3.12 MILLIGRAM(S): 3.12 TABLET, FILM COATED ORAL at 05:03

## 2024-09-26 LAB
ANION GAP SERPL CALC-SCNC: 10 MMOL/L — SIGNIFICANT CHANGE UP (ref 7–14)
BASOPHILS # BLD AUTO: 0.02 K/UL — SIGNIFICANT CHANGE UP (ref 0–0.2)
BASOPHILS NFR BLD AUTO: 0.2 % — SIGNIFICANT CHANGE UP (ref 0–1)
BUN SERPL-MCNC: 8 MG/DL — LOW (ref 10–20)
CALCIUM SERPL-MCNC: 8.6 MG/DL — SIGNIFICANT CHANGE UP (ref 8.4–10.5)
CHLORIDE SERPL-SCNC: 103 MMOL/L — SIGNIFICANT CHANGE UP (ref 98–110)
CO2 SERPL-SCNC: 29 MMOL/L — SIGNIFICANT CHANGE UP (ref 17–32)
CREAT SERPL-MCNC: 0.9 MG/DL — SIGNIFICANT CHANGE UP (ref 0.7–1.5)
EGFR: 91 ML/MIN/1.73M2 — SIGNIFICANT CHANGE UP
EGFR: 91 ML/MIN/1.73M2 — SIGNIFICANT CHANGE UP
EOSINOPHIL # BLD AUTO: 0.1 K/UL — SIGNIFICANT CHANGE UP (ref 0–0.7)
EOSINOPHIL NFR BLD AUTO: 1.2 % — SIGNIFICANT CHANGE UP (ref 0–8)
GLUCOSE BLDC GLUCOMTR-MCNC: 101 MG/DL — HIGH (ref 70–99)
GLUCOSE BLDC GLUCOMTR-MCNC: 104 MG/DL — HIGH (ref 70–99)
GLUCOSE BLDC GLUCOMTR-MCNC: 146 MG/DL — HIGH (ref 70–99)
GLUCOSE BLDC GLUCOMTR-MCNC: 169 MG/DL — HIGH (ref 70–99)
GLUCOSE SERPL-MCNC: 95 MG/DL — SIGNIFICANT CHANGE UP (ref 70–99)
HCT VFR BLD CALC: 39.9 % — LOW (ref 42–52)
HGB BLD-MCNC: 14 G/DL — SIGNIFICANT CHANGE UP (ref 14–18)
IMM GRANULOCYTES NFR BLD AUTO: 0.5 % — HIGH (ref 0.1–0.3)
LYMPHOCYTES # BLD AUTO: 1.73 K/UL — SIGNIFICANT CHANGE UP (ref 1.2–3.4)
LYMPHOCYTES # BLD AUTO: 20.5 % — SIGNIFICANT CHANGE UP (ref 20.5–51.1)
MCHC RBC-ENTMCNC: 32.7 PG — HIGH (ref 27–31)
MCHC RBC-ENTMCNC: 35.1 G/DL — SIGNIFICANT CHANGE UP (ref 32–37)
MCV RBC AUTO: 93.2 FL — SIGNIFICANT CHANGE UP (ref 80–94)
MONOCYTES # BLD AUTO: 0.77 K/UL — HIGH (ref 0.1–0.6)
MONOCYTES NFR BLD AUTO: 9.1 % — SIGNIFICANT CHANGE UP (ref 1.7–9.3)
NEUTROPHILS # BLD AUTO: 5.76 K/UL — SIGNIFICANT CHANGE UP (ref 1.4–6.5)
NEUTROPHILS NFR BLD AUTO: 68.5 % — SIGNIFICANT CHANGE UP (ref 42.2–75.2)
NRBC # BLD: 0 /100 WBCS — SIGNIFICANT CHANGE UP (ref 0–0)
NRBC BLD-RTO: 0 /100 WBCS — SIGNIFICANT CHANGE UP (ref 0–0)
PLATELET # BLD AUTO: 153 K/UL — SIGNIFICANT CHANGE UP (ref 130–400)
PMV BLD: 9.6 FL — SIGNIFICANT CHANGE UP (ref 7.4–10.4)
POTASSIUM SERPL-MCNC: 3.6 MMOL/L — SIGNIFICANT CHANGE UP (ref 3.5–5)
POTASSIUM SERPL-SCNC: 3.6 MMOL/L — SIGNIFICANT CHANGE UP (ref 3.5–5)
RBC # BLD: 4.28 M/UL — LOW (ref 4.7–6.1)
RBC # FLD: 12.7 % — SIGNIFICANT CHANGE UP (ref 11.5–14.5)
SODIUM SERPL-SCNC: 142 MMOL/L — SIGNIFICANT CHANGE UP (ref 135–146)
WBC # BLD: 8.42 K/UL — SIGNIFICANT CHANGE UP (ref 4.8–10.8)
WBC # FLD AUTO: 8.42 K/UL — SIGNIFICANT CHANGE UP (ref 4.8–10.8)

## 2024-09-26 PROCEDURE — 99231 SBSQ HOSP IP/OBS SF/LOW 25: CPT

## 2024-09-26 PROCEDURE — 99232 SBSQ HOSP IP/OBS MODERATE 35: CPT

## 2024-09-26 RX ADMIN — INSULIN LISPRO 2: 100 INJECTION, SOLUTION INTRAVENOUS; SUBCUTANEOUS at 12:44

## 2024-09-26 RX ADMIN — RIVASTIGMINE 1 PATCH: 13.3 PATCH, EXTENDED RELEASE TRANSDERMAL at 05:20

## 2024-09-26 RX ADMIN — Medication 2 TABLET(S): at 17:59

## 2024-09-26 RX ADMIN — Medication 2 TABLET(S): at 12:44

## 2024-09-26 RX ADMIN — Medication 2 TABLET(S): at 05:23

## 2024-09-26 RX ADMIN — LIDOCAINE HYDROCHLORIDE 1 PATCH: 20 JELLY TOPICAL at 12:43

## 2024-09-26 RX ADMIN — Medication 100 MILLIGRAM(S): at 22:28

## 2024-09-26 RX ADMIN — ENTACAPONE 200 MILLIGRAM(S): 200 TABLET, FILM COATED ORAL at 17:58

## 2024-09-26 RX ADMIN — Medication 1 TABLET(S): at 22:29

## 2024-09-26 RX ADMIN — ENTACAPONE 200 MILLIGRAM(S): 200 TABLET, FILM COATED ORAL at 09:17

## 2024-09-26 RX ADMIN — AMLODIPINE BESYLATE 5 MILLIGRAM(S): 10 TABLET ORAL at 05:22

## 2024-09-26 RX ADMIN — CARVEDILOL 3.12 MILLIGRAM(S): 3.12 TABLET, FILM COATED ORAL at 17:59

## 2024-09-26 RX ADMIN — LEVETIRACETAM 500 MILLIGRAM(S): 10 INJECTION, SOLUTION INTRAVENOUS at 17:58

## 2024-09-26 RX ADMIN — Medication 3 MILLIGRAM(S): at 22:27

## 2024-09-26 RX ADMIN — ENTACAPONE 200 MILLIGRAM(S): 200 TABLET, FILM COATED ORAL at 12:44

## 2024-09-26 RX ADMIN — RIVASTIGMINE 1 PATCH: 13.3 PATCH, EXTENDED RELEASE TRANSDERMAL at 05:27

## 2024-09-26 RX ADMIN — GABAPENTIN 100 MILLIGRAM(S): 400 CAPSULE ORAL at 22:27

## 2024-09-26 RX ADMIN — Medication 100 MILLIGRAM(S): at 12:44

## 2024-09-26 RX ADMIN — CEFTRIAXONE 100 MILLIGRAM(S): 500 INJECTION, POWDER, FOR SOLUTION INTRAMUSCULAR; INTRAVENOUS at 22:30

## 2024-09-26 RX ADMIN — Medication 81 MILLIGRAM(S): at 12:44

## 2024-09-26 RX ADMIN — RIVASTIGMINE 1 PATCH: 13.3 PATCH, EXTENDED RELEASE TRANSDERMAL at 05:22

## 2024-09-26 RX ADMIN — LEVETIRACETAM 500 MILLIGRAM(S): 10 INJECTION, SOLUTION INTRAVENOUS at 05:22

## 2024-09-26 RX ADMIN — TAMSULOSIN HYDROCHLORIDE 0.4 MILLIGRAM(S): 0.4 CAPSULE ORAL at 22:28

## 2024-09-26 RX ADMIN — CARVEDILOL 3.12 MILLIGRAM(S): 3.12 TABLET, FILM COATED ORAL at 05:22

## 2024-09-27 ENCOUNTER — TRANSCRIPTION ENCOUNTER (OUTPATIENT)
Age: 74
End: 2024-09-27

## 2024-09-27 DIAGNOSIS — N13.30 UNSPECIFIED HYDRONEPHROSIS: ICD-10-CM

## 2024-09-27 LAB
GLUCOSE BLDC GLUCOMTR-MCNC: 102 MG/DL — HIGH (ref 70–99)
GLUCOSE BLDC GLUCOMTR-MCNC: 108 MG/DL — HIGH (ref 70–99)
GLUCOSE BLDC GLUCOMTR-MCNC: 175 MG/DL — HIGH (ref 70–99)
GLUCOSE BLDC GLUCOMTR-MCNC: 94 MG/DL — SIGNIFICANT CHANGE UP (ref 70–99)

## 2024-09-27 PROCEDURE — 99232 SBSQ HOSP IP/OBS MODERATE 35: CPT

## 2024-09-27 RX ORDER — AMOXICILLIN AND CLAVULANATE POTASSIUM 500; 125 MG/1; MG/1
1 TABLET, FILM COATED ORAL
Qty: 14 | Refills: 0
Start: 2024-09-27 | End: 2024-10-03

## 2024-09-27 RX ORDER — NITROFURANTOIN MACROCRYSTAL 100 MG
1 CAPSULE ORAL
Qty: 14 | Refills: 0
Start: 2024-09-27 | End: 2024-10-03

## 2024-09-27 RX ORDER — NITROFURANTOIN MACROCRYSTAL 100 MG
100 CAPSULE ORAL
Refills: 0 | Status: DISCONTINUED | OUTPATIENT
Start: 2024-09-27 | End: 2024-09-28

## 2024-09-27 RX ORDER — AMOXICILLIN AND CLAVULANATE POTASSIUM 500; 125 MG/1; MG/1
1 TABLET, FILM COATED ORAL
Refills: 0 | Status: DISCONTINUED | OUTPATIENT
Start: 2024-09-27 | End: 2024-09-27

## 2024-09-27 RX ORDER — TAMSULOSIN HYDROCHLORIDE 0.4 MG/1
1 CAPSULE ORAL
Qty: 30 | Refills: 0
Start: 2024-09-27 | End: 2024-10-26

## 2024-09-27 RX ADMIN — Medication 100 MILLIGRAM(S): at 22:08

## 2024-09-27 RX ADMIN — CARVEDILOL 3.12 MILLIGRAM(S): 3.12 TABLET, FILM COATED ORAL at 17:17

## 2024-09-27 RX ADMIN — RIVASTIGMINE 1 PATCH: 13.3 PATCH, EXTENDED RELEASE TRANSDERMAL at 06:50

## 2024-09-27 RX ADMIN — Medication 100 MILLIGRAM(S): at 17:17

## 2024-09-27 RX ADMIN — AMLODIPINE BESYLATE 5 MILLIGRAM(S): 10 TABLET ORAL at 06:51

## 2024-09-27 RX ADMIN — GABAPENTIN 100 MILLIGRAM(S): 400 CAPSULE ORAL at 22:09

## 2024-09-27 RX ADMIN — INSULIN LISPRO 2: 100 INJECTION, SOLUTION INTRAVENOUS; SUBCUTANEOUS at 12:47

## 2024-09-27 RX ADMIN — Medication 81 MILLIGRAM(S): at 12:46

## 2024-09-27 RX ADMIN — Medication 2 TABLET(S): at 22:09

## 2024-09-27 RX ADMIN — ENTACAPONE 200 MILLIGRAM(S): 200 TABLET, FILM COATED ORAL at 12:46

## 2024-09-27 RX ADMIN — Medication 1 TABLET(S): at 22:09

## 2024-09-27 RX ADMIN — LIDOCAINE HYDROCHLORIDE 1 PATCH: 20 JELLY TOPICAL at 12:45

## 2024-09-27 RX ADMIN — LEVETIRACETAM 500 MILLIGRAM(S): 10 INJECTION, SOLUTION INTRAVENOUS at 06:51

## 2024-09-27 RX ADMIN — ENTACAPONE 200 MILLIGRAM(S): 200 TABLET, FILM COATED ORAL at 17:17

## 2024-09-27 RX ADMIN — Medication 100 MILLIGRAM(S): at 12:46

## 2024-09-27 RX ADMIN — Medication 2 TABLET(S): at 12:46

## 2024-09-27 RX ADMIN — LEVETIRACETAM 500 MILLIGRAM(S): 10 INJECTION, SOLUTION INTRAVENOUS at 17:17

## 2024-09-27 RX ADMIN — Medication 3 MILLIGRAM(S): at 22:08

## 2024-09-27 RX ADMIN — ENTACAPONE 200 MILLIGRAM(S): 200 TABLET, FILM COATED ORAL at 06:51

## 2024-09-27 RX ADMIN — TAMSULOSIN HYDROCHLORIDE 0.4 MILLIGRAM(S): 0.4 CAPSULE ORAL at 22:09

## 2024-09-27 RX ADMIN — Medication 2 TABLET(S): at 17:17

## 2024-09-27 RX ADMIN — Medication 2 TABLET(S): at 07:03

## 2024-09-27 RX ADMIN — CARVEDILOL 3.12 MILLIGRAM(S): 3.12 TABLET, FILM COATED ORAL at 06:51

## 2024-09-27 RX ADMIN — RIVASTIGMINE 1 PATCH: 13.3 PATCH, EXTENDED RELEASE TRANSDERMAL at 08:29

## 2024-09-28 ENCOUNTER — TRANSCRIPTION ENCOUNTER (OUTPATIENT)
Age: 74
End: 2024-09-28

## 2024-09-28 VITALS
RESPIRATION RATE: 18 BRPM | HEART RATE: 57 BPM | OXYGEN SATURATION: 95 % | TEMPERATURE: 98 F | DIASTOLIC BLOOD PRESSURE: 72 MMHG | SYSTOLIC BLOOD PRESSURE: 118 MMHG

## 2024-09-28 LAB
GLUCOSE BLDC GLUCOMTR-MCNC: 111 MG/DL — HIGH (ref 70–99)
GLUCOSE BLDC GLUCOMTR-MCNC: 80 MG/DL — SIGNIFICANT CHANGE UP (ref 70–99)

## 2024-09-28 PROCEDURE — 99239 HOSP IP/OBS DSCHRG MGMT >30: CPT

## 2024-09-28 RX ORDER — NITROFURANTOIN MACROCRYSTAL 100 MG
1 CAPSULE ORAL
Qty: 14 | Refills: 0
Start: 2024-09-28 | End: 2024-10-04

## 2024-09-28 RX ADMIN — CARVEDILOL 3.12 MILLIGRAM(S): 3.12 TABLET, FILM COATED ORAL at 05:58

## 2024-09-28 RX ADMIN — Medication 2 TABLET(S): at 11:27

## 2024-09-28 RX ADMIN — LEVETIRACETAM 500 MILLIGRAM(S): 10 INJECTION, SOLUTION INTRAVENOUS at 05:57

## 2024-09-28 RX ADMIN — RIVASTIGMINE 1 PATCH: 13.3 PATCH, EXTENDED RELEASE TRANSDERMAL at 06:00

## 2024-09-28 RX ADMIN — Medication 100 MILLIGRAM(S): at 11:28

## 2024-09-28 RX ADMIN — Medication 81 MILLIGRAM(S): at 11:27

## 2024-09-28 RX ADMIN — Medication 2 TABLET(S): at 07:02

## 2024-09-28 RX ADMIN — LIDOCAINE HYDROCHLORIDE 1 PATCH: 20 JELLY TOPICAL at 11:27

## 2024-09-28 RX ADMIN — Medication 650 MILLIGRAM(S): at 06:36

## 2024-09-28 RX ADMIN — Medication 1 APPLICATION(S): at 06:05

## 2024-09-28 RX ADMIN — AMLODIPINE BESYLATE 5 MILLIGRAM(S): 10 TABLET ORAL at 05:57

## 2024-09-28 RX ADMIN — RIVASTIGMINE 1 PATCH: 13.3 PATCH, EXTENDED RELEASE TRANSDERMAL at 05:58

## 2024-09-28 RX ADMIN — Medication 100 MILLIGRAM(S): at 11:27

## 2024-09-28 RX ADMIN — ENTACAPONE 200 MILLIGRAM(S): 200 TABLET, FILM COATED ORAL at 06:58

## 2024-09-28 RX ADMIN — ENTACAPONE 200 MILLIGRAM(S): 200 TABLET, FILM COATED ORAL at 11:27

## 2024-09-28 RX ADMIN — Medication 650 MILLIGRAM(S): at 06:06

## 2024-09-29 LAB
-  AMPICILLIN: SIGNIFICANT CHANGE UP
-  CIPROFLOXACIN: SIGNIFICANT CHANGE UP
-  LEVOFLOXACIN: SIGNIFICANT CHANGE UP
-  NITROFURANTOIN: SIGNIFICANT CHANGE UP
-  TETRACYCLINE: SIGNIFICANT CHANGE UP
-  VANCOMYCIN: SIGNIFICANT CHANGE UP
CULTURE RESULTS: ABNORMAL
METHOD TYPE: SIGNIFICANT CHANGE UP
ORGANISM # SPEC MICROSCOPIC CNT: ABNORMAL
ORGANISM # SPEC MICROSCOPIC CNT: SIGNIFICANT CHANGE UP
SPECIMEN SOURCE: SIGNIFICANT CHANGE UP

## 2024-10-07 DIAGNOSIS — F02.80 DEMENTIA IN OTHER DISEASES CLASSIFIED ELSEWHERE, UNSPECIFIED SEVERITY, WITHOUT BEHAVIORAL DISTURBANCE, PSYCHOTIC DISTURBANCE, MOOD DISTURBANCE, AND ANXIETY: ICD-10-CM

## 2024-10-07 DIAGNOSIS — E11.9 TYPE 2 DIABETES MELLITUS WITHOUT COMPLICATIONS: ICD-10-CM

## 2024-10-07 DIAGNOSIS — G20.A1 PARKINSON'S DISEASE WITHOUT DYSKINESIA, WITHOUT MENTION OF FLUCTUATIONS: ICD-10-CM

## 2024-10-07 DIAGNOSIS — M10.9 GOUT, UNSPECIFIED: ICD-10-CM

## 2024-10-07 DIAGNOSIS — Z79.82 LONG TERM (CURRENT) USE OF ASPIRIN: ICD-10-CM

## 2024-10-07 DIAGNOSIS — Z88.0 ALLERGY STATUS TO PENICILLIN: ICD-10-CM

## 2024-10-07 DIAGNOSIS — M54.50 LOW BACK PAIN, UNSPECIFIED: ICD-10-CM

## 2024-10-07 DIAGNOSIS — G40.909 EPILEPSY, UNSPECIFIED, NOT INTRACTABLE, WITHOUT STATUS EPILEPTICUS: ICD-10-CM

## 2024-10-07 DIAGNOSIS — R91.1 SOLITARY PULMONARY NODULE: ICD-10-CM

## 2024-10-07 DIAGNOSIS — Z88.1 ALLERGY STATUS TO OTHER ANTIBIOTIC AGENTS: ICD-10-CM

## 2024-10-07 DIAGNOSIS — N13.6 PYONEPHROSIS: ICD-10-CM

## 2024-10-07 DIAGNOSIS — I10 ESSENTIAL (PRIMARY) HYPERTENSION: ICD-10-CM

## 2024-10-07 DIAGNOSIS — I69.354 HEMIPLEGIA AND HEMIPARESIS FOLLOWING CEREBRAL INFARCTION AFFECTING LEFT NON-DOMINANT SIDE: ICD-10-CM

## 2024-10-07 DIAGNOSIS — Z88.2 ALLERGY STATUS TO SULFONAMIDES: ICD-10-CM

## 2024-11-06 NOTE — ED ADULT NURSE NOTE - NEURO MENTATION
normal Cimetidine Counseling:  I discussed with the patient the risks of Cimetidine including but not limited to gynecomastia, headache, diarrhea, nausea, drowsiness, arrhythmias, pancreatitis, skin rashes, psychosis, bone marrow suppression and kidney toxicity. Hydroquinone Counseling:  Patient advised that medication may result in skin irritation, lightening (hypopigmentation), dryness, and burning.  In the event of skin irritation, the patient was advised to reduce the amount of the drug applied or use it less frequently.  Rarely, spots that are treated with hydroquinone can become darker (pseudoochronosis).  Should this occur, patient instructed to stop medication and call the office. The patient verbalized understanding of the proper use and possible adverse effects of hydroquinone.  All of the patient's questions and concerns were addressed. Clofazimine Counseling:  I discussed with the patient the risks of clofazimine including but not limited to skin and eye pigmentation, liver damage, nausea/vomiting, gastrointestinal bleeding and allergy. Cyclophosphamide Counseling:  I discussed with the patient the risks of cyclophosphamide including but not limited to hair loss, hormonal abnormalities, decreased fertility, abdominal pain, diarrhea, nausea and vomiting, bone marrow suppression and infection. The patient understands that monitoring is required while taking this medication. Zoryve Counseling:  I discussed with the patient that Zoryve is not for use in the eyes, mouth or vagina. The most commonly reported side effects include diarrhea, headache, insomnia, application site pain, upper respiratory tract infections, and urinary tract infections.  All of the patient's questions and concerns were addressed. SSKI Counseling:  I discussed with the patient the risks of SSKI including but not limited to thyroid abnormalities, metallic taste, GI upset, fever, headache, acne, arthralgias, paraesthesias, lymphadenopathy, easy bleeding, arrhythmias, and allergic reaction. Topical Sulfur Applications Pregnancy And Lactation Text: This medication is considered safe during pregnancy and breast feeding secondary to limited systemic absorption. Xeljanz Counseling: I discussed with the patient the risks of Xeljanz therapy including increased risk of infection, liver issues, headache, diarrhea, or cold symptoms. Live vaccines should be avoided. They were instructed to call if they have any problems. Cantharidin Counseling:  I discussed with the patient the risks of Cantharidin including but not limited to pain, redness, burning, itching, and blistering. Ilumya Counseling: I discussed with the patient the risks of tildrakizumab including but not limited to immunosuppression, malignancy, posterior leukoencephalopathy syndrome, and serious infections.  The patient understands that monitoring is required including a PPD at baseline and must alert us or the primary physician if symptoms of infection or other concerning signs are noted. Taltz Counseling: I discussed with the patient the risks of ixekizumab including but not limited to immunosuppression, serious infections, worsening of inflammatory bowel disease and drug reactions.  The patient understands that monitoring is required including a PPD at baseline and must alert us or the primary physician if symptoms of infection or other concerning signs are noted. Cibinqo Pregnancy And Lactation Text: It is unknown if this medication will adversely affect pregnancy or breast feeding.  You should not take this medication if you are currently pregnant or planning a pregnancy or while breastfeeding. Topical Clindamycin Counseling: Patient counseled that this medication may cause skin irritation or allergic reactions.  In the event of skin irritation, the patient was advised to reduce the amount of the drug applied or use it less frequently.   The patient verbalized understanding of the proper use and possible adverse effects of clindamycin.  All of the patient's questions and concerns were addressed. Ivermectin Counseling:  Patient instructed to take medication on an empty stomach with a full glass of water.  Patient informed of potential adverse effects including but not limited to nausea, diarrhea, dizziness, itching, and swelling of the extremities or lymph nodes.  The patient verbalized understanding of the proper use and possible adverse effects of ivermectin.  All of the patient's questions and concerns were addressed. Rhofade Pregnancy And Lactation Text: This medication has not been assigned a Pregnancy Risk Category. It is unknown if the medication is excreted in breast milk. Aklief Pregnancy And Lactation Text: It is unknown if this medication is safe to use during pregnancy.  It is unknown if this medication is excreted in breast milk.  Breastfeeding women should use the topical cream on the smallest area of the skin for the shortest time needed while breastfeeding.  Do not apply to nipple and areola. Quinolones Pregnancy And Lactation Text: This medication is Pregnancy Category C and it isn't know if it is safe during pregnancy. It is also excreted in breast milk. Siliq Pregnancy And Lactation Text: The risk during pregnancy and breastfeeding is uncertain with this medication. Olanzapine Pregnancy And Lactation Text: This medication is pregnancy category C.   There are no adequate and well controlled trials with olanzapine in pregnant females.  Olanzapine should be used during pregnancy only if the potential benefit justifies the potential risk to the fetus.   In a study in lactating healthy women, olanzapine was excreted in breast milk.  It is recommended that women taking olanzapine should not breast feed. Libtayo Pregnancy And Lactation Text: This medication is contraindicated in pregnancy and when breast feeding. Cosentyx Pregnancy And Lactation Text: This medication is Pregnancy Category B and is considered safe during pregnancy. It is unknown if this medication is excreted in breast milk. Hydroxychloroquine Counseling:  I discussed with the patient that a baseline ophthalmologic exam is needed at the start of therapy and every year thereafter while on therapy. A CBC may also be warranted for monitoring.  The side effects of this medication were discussed with the patient, including but not limited to agranulocytosis, aplastic anemia, seizures, rashes, retinopathy, and liver toxicity. Patient instructed to call the office should any adverse effect occur.  The patient verbalized understanding of the proper use and possible adverse effects of Plaquenil.  All the patient's questions and concerns were addressed. Doxycycline Counseling:  Patient counseled regarding possible photosensitivity and increased risk for sunburn.  Patient instructed to avoid sunlight, if possible.  When exposed to sunlight, patients should wear protective clothing, sunglasses, and sunscreen.  The patient was instructed to call the office immediately if the following severe adverse effects occur:  hearing changes, easy bruising/bleeding, severe headache, or vision changes.  The patient verbalized understanding of the proper use and possible adverse effects of doxycycline.  All of the patient's questions and concerns were addressed. Cantharidin Pregnancy And Lactation Text: This medication has not been proven safe during pregnancy. It is unknown if this medication is excreted in breast milk. Griseofulvin Counseling:  I discussed with the patient the risks of griseofulvin including but not limited to photosensitivity, cytopenia, liver damage, nausea/vomiting and severe allergy.  The patient understands that this medication is best absorbed when taken with a fatty meal (e.g., ice cream or french fries). Clofazimine Pregnancy And Lactation Text: This medication is Pregnancy Category C and isn't considered safe during pregnancy. It is excreted in breast milk. Cimetidine Pregnancy And Lactation Text: This medication is Pregnancy Category B and is considered safe during pregnancy. It is also excreted in breast milk and breast feeding isn't recommended. Xelpeterz Pregnancy And Lactation Text: This medication is Pregnancy Category D and is not considered safe during pregnancy.  The risk during breast feeding is also uncertain. Birth Control Pills Counseling: Birth Control Pill Counseling: I discussed with the patient the potential side effects of OCPs including but not limited to increased risk of stroke, heart attack, thrombophlebitis, deep venous thrombosis, hepatic adenomas, breast changes, GI upset, headaches, and depression.  The patient verbalized understanding of the proper use and possible adverse effects of OCPs. All of the patient's questions and concerns were addressed. Opzelura Counseling:  I discussed with the patient the risks of Opzelura including but not limited to nasopharngitis, bronchitis, ear infection, eosinophila, hives, diarrhea, folliculitis, tonsillitis, and rhinorrhea.  Taken orally, this medication has been linked to serious infections; higher rate of mortality; malignancy and lymphoproliferative disorders; major adverse cardiovascular events; thrombosis; thrombocytopenia, anemia, and neutropenia; and lipid elevations. Wartpeel Counseling:  I discussed with the patient the risks of Wartpeel including but not limited to erythema, scaling, itching, weeping, crusting, and pain. Include Pregnancy/Lactation Warning?: No Hydroquinone Pregnancy And Lactation Text: This medication has not been assigned a Pregnancy Risk Category but animal studies failed to show danger with the topical medication. It is unknown if the medication is excreted in breast milk. Topical Clindamycin Pregnancy And Lactation Text: This medication is Pregnancy Category B and is considered safe during pregnancy. It is unknown if it is excreted in breast milk. Zoryve Pregnancy And Lactation Text: It is unknown if this medication can cause problems during pregnancy and breastfeeding. 5-Fu Counseling: 5-Fluorouracil Counseling:  I discussed with the patient the risks of 5-fluorouracil including but not limited to erythema, scaling, itching, weeping, crusting, and pain. Cyclophosphamide Pregnancy And Lactation Text: This medication is Pregnancy Category D and it isn't considered safe during pregnancy. This medication is excreted in breast milk. Sski Pregnancy And Lactation Text: This medication is Pregnancy Category D and isn't considered safe during pregnancy. It is excreted in breast milk. Odomzo Counseling- I discussed with the patient the risks of Odomzo including but not limited to nausea, vomiting, diarrhea, constipation, weight loss, changes in the sense of taste, decreased appetite, muscle spasms, and hair loss.  The patient verbalized understanding of the proper use and possible adverse effects of Odomzo.  All of the patient's questions and concerns were addressed. Rifampin Counseling: I discussed with the patient the risks of rifampin including but not limited to liver damage, kidney damage, red-orange body fluids, nausea/vomiting and severe allergy. Ivermectin Pregnancy And Lactation Text: This medication is Pregnancy Category C and it isn't known if it is safe during pregnancy. It is also excreted in breast milk. Litfulo Counseling: I discussed with the patient the risks of Litfulo therapy including but not limited to upper respiratory tract infections, shingles, cold sores, and nausea. Live vaccines should be avoided.  This medication has been linked to serious infections; higher rate of mortality; malignancy and lymphoproliferative disorders; major adverse cardiovascular events; thrombosis; gastrointestinal perforations; neutropenia; lymphopenia; anemia; liver enzyme elevations; and lipid elevations. Solaraze Counseling:  I discussed with the patient the risks of Solaraze including but not limited to erythema, scaling, itching, weeping, crusting, and pain. Simponi Counseling:  I discussed with the patient the risks of golimumab including but not limited to myelosuppression, immunosuppression, autoimmune hepatitis, demyelinating diseases, lymphoma, and serious infections.  The patient understands that monitoring is required including a PPD at baseline and must alert us or the primary physician if symptoms of infection or other concerning signs are noted. Oral Minoxidil Counseling- I discussed with the patient the risks of oral minoxidil including but not limited to shortness of breath, swelling of the feet or ankles, dizziness, lightheadedness, unwanted hair growth and allergic reaction.  The patient verbalized understanding of the proper use and possible adverse effects of oral minoxidil.  All of the patient's questions and concerns were addressed. Azelaic Acid Counseling: Patient counseled that medicine may cause skin irritation and to avoid applying near the eyes.  In the event of skin irritation, the patient was advised to reduce the amount of the drug applied or use it less frequently.   The patient verbalized understanding of the proper use and possible adverse effects of azelaic acid.  All of the patient's questions and concerns were addressed. Birth Control Pills Pregnancy And Lactation Text: This medication should be avoided if pregnant and for the first 30 days post-partum. Doxycycline Pregnancy And Lactation Text: This medication is Pregnancy Category D and not consider safe during pregnancy. It is also excreted in breast milk but is considered safe for shorter treatment courses. Opzelura Pregnancy And Lactation Text: There is insufficient data to evaluate drug-associated risk for major birth defects, miscarriage, or other adverse maternal or fetal outcomes.  There is a pregnancy registry that monitors pregnancy outcomes in pregnant persons exposed to the medication during pregnancy.  It is unknown if this medication is excreted in breast milk.  Do not breastfeed during treatment and for about 4 weeks after the last dose. Hydroxychloroquine Pregnancy And Lactation Text: This medication has been shown to cause fetal harm but it isn't assigned a Pregnancy Risk Category. There are small amounts excreted in breast milk. Dupixent Counseling: I discussed with the patient the risks of dupilumab including but not limited to eye inflammation and irritation, cold sores, injection site reactions, allergic reactions and increased risk of parasitic infection. The patient understands that monitoring is required and they must alert us or the primary physician if symptoms of infection or other concerning signs are noted. Doxepin Counseling:  Patient advised that the medication is sedating and not to drive a car after taking this medication. Patient informed of potential adverse effects including but not limited to dry mouth, urinary retention, and blurry vision.  The patient verbalized understanding of the proper use and possible adverse effects of doxepin.  All of the patient's questions and concerns were addressed. Colchicine Counseling:  Patient counseled regarding adverse effects including but not limited to stomach upset (nausea, vomiting, stomach pain, or diarrhea).  Patient instructed to limit alcohol consumption while taking this medication.  Colchicine may reduce blood counts especially with prolonged use.  The patient understands that monitoring of kidney function and blood counts may be required, especially at baseline. The patient verbalized understanding of the proper use and possible adverse effects of colchicine.  All of the patient's questions and concerns were addressed. Griseofulvin Pregnancy And Lactation Text: This medication is Pregnancy Category X and is known to cause serious birth defects. It is unknown if this medication is excreted in breast milk but breast feeding should be avoided. Cyclosporine Counseling:  I discussed with the patient the risks of cyclosporine including but not limited to hypertension, gingival hyperplasia,myelosuppression, immunosuppression, liver damage, kidney damage, neurotoxicity, lymphoma, and serious infections. The patient understands that monitoring is required including baseline blood pressure, CBC, CMP, lipid panel and uric acid, and then 1-2 times monthly CMP and blood pressure. Imiquimod Counseling:  I discussed with the patient the risks of imiquimod including but not limited to erythema, scaling, itching, weeping, crusting, and pain.  Patient understands that the inflammatory response to imiquimod is variable from person to person and was educated regarded proper titration schedule.  If flu-like symptoms develop, patient knows to discontinue the medication and contact us. Wartpeel Pregnancy And Lactation Text: This medication is Pregnancy Category X and contraindicated in pregnancy and in women who may become pregnant. It is unknown if this medication is excreted in breast milk. Topical Ketoconazole Counseling: Patient counseled that this medication may cause skin irritation or allergic reactions.  In the event of skin irritation, the patient was advised to reduce the amount of the drug applied or use it less frequently.   The patient verbalized understanding of the proper use and possible adverse effects of ketoconazole.  All of the patient's questions and concerns were addressed. Tremfya Counseling: I discussed with the patient the risks of guselkumab including but not limited to immunosuppression, serious infections, and drug reactions.  The patient understands that monitoring is required including a PPD at baseline and must alert us or the primary physician if symptoms of infection or other concerning signs are noted. Zyclara Counseling:  I discussed with the patient the risks of imiquimod including but not limited to erythema, scaling, itching, weeping, crusting, and pain.  Patient understands that the inflammatory response to imiquimod is variable from person to person and was educated regarded proper titration schedule.  If flu-like symptoms develop, patient knows to discontinue the medication and contact us. Thalidomide Counseling: I discussed with the patient the risks of thalidomide including but not limited to birth defects, anxiety, weakness, chest pain, dizziness, cough and severe allergy. Infliximab Counseling:  I discussed with the patient the risks of infliximab including but not limited to myelosuppression, immunosuppression, autoimmune hepatitis, demyelinating diseases, lymphoma, and serious infections.  The patient understands that monitoring is required including a PPD at baseline and must alert us or the primary physician if symptoms of infection or other concerning signs are noted. Oral Minoxidil Pregnancy And Lactation Text: This medication should only be used when clearly needed if you are pregnant, attempting to become pregnant or breast feeding. Odomzo Pregnancy And Lactation Text: This medication is Pregnancy Category X and is absolutely contraindicated during pregnancy. It is unknown if it is excreted in breast milk. Litfulo Pregnancy And Lactation Text: Based on animal studies, Lifulo may cause embryo-fetal harm when administered to pregnant women.  The medication should not be used in pregnancy.  Breastfeeding is not recommended during treatment. Rifampin Pregnancy And Lactation Text: This medication is Pregnancy Category C and it isn't know if it is safe during pregnancy. It is also excreted in breast milk and should not be used if you are breast feeding. Azelaic Acid Pregnancy And Lactation Text: This medication is considered safe during pregnancy and breast feeding. Low Dose Naltrexone Counseling- I discussed with the patient the potential risks and side effects of low dose naltrexone including but not limited to: more vivid dreams, headaches, nausea, vomiting, abdominal pain, fatigue, dizziness, and anxiety. Dupixent Pregnancy And Lactation Text: This medication likely crosses the placenta but the risk for the fetus is uncertain. This medication is excreted in breast milk. Erythromycin Counseling:  I discussed with the patient the risks of erythromycin including but not limited to GI upset, allergic reaction, drug rash, diarrhea, increase in liver enzymes, and yeast infections. Solaraze Pregnancy And Lactation Text: This medication is Pregnancy Category B and is considered safe. There is some data to suggest avoiding during the third trimester. It is unknown if this medication is excreted in breast milk. Picato Counseling:  I discussed with the patient the risks of Picato including but not limited to erythema, scaling, itching, weeping, crusting, and pain. Spironolactone Counseling: Patient advised regarding risks of diarrhea, abdominal pain, hyperkalemia, birth defects (for female patients), liver toxicity and renal toxicity. The patient may need blood work to monitor liver and kidney function and potassium levels while on therapy. The patient verbalized understanding of the proper use and possible adverse effects of spironolactone.  All of the patient's questions and concerns were addressed. Itraconazole Counseling:  I discussed with the patient the risks of itraconazole including but not limited to liver damage, nausea/vomiting, neuropathy, and severe allergy.  The patient understands that this medication is best absorbed when taken with acidic beverages such as non-diet cola or ginger ale.  The patient understands that monitoring is required including baseline LFTs and repeat LFTs at intervals.  The patient understands that they are to contact us or the primary physician if concerning signs are noted. Acitretin Counseling:  I discussed with the patient the risks of acitretin including but not limited to hair loss, dry lips/skin/eyes, liver damage, hyperlipidemia, depression/suicidal ideation, photosensitivity.  Serious rare side effects can include but are not limited to pancreatitis, pseudotumor cerebri, bony changes, clot formation/stroke/heart attack.  Patient understands that alcohol is contraindicated since it can result in liver toxicity and significantly prolong the elimination of the drug by many years. Winlevi Counseling:  I discussed with the patient the risks of topical clascoterone including but not limited to erythema, scaling, itching, and stinging. Patient voiced their understanding. Adbry Counseling: I discussed with the patient the risks of tralokinumab including but not limited to eye infection and irritation, cold sores, injection site reactions, worsening of asthma, allergic reactions and increased risk of parasitic infection.  Live vaccines should be avoided while taking tralokinumab. The patient understands that monitoring is required and they must alert us or the primary physician if symptoms of infection or other concerning signs are noted. Imiquimod Pregnancy And Lactation Text: This medication is Pregnancy Category C. It is unknown if this medication is excreted in breast milk. Cyclosporine Pregnancy And Lactation Text: This medication is Pregnancy Category C and it isn't know if it is safe during pregnancy. This medication is excreted in breast milk. Olumiant Counseling: I discussed with the patient the risks of Olumiant therapy including but not limited to upper respiratory tract infections, shingles, cold sores, and nausea. Live vaccines should be avoided.  This medication has been linked to serious infections; higher rate of mortality; malignancy and lymphoproliferative disorders; major adverse cardiovascular events; thrombosis; gastrointestinal perforations; neutropenia; lymphopenia; anemia; liver enzyme elevations; and lipid elevations. Drysol Counseling:  I discussed with the patient the risks of drysol/aluminum chloride including but not limited to skin rash, itching, irritation, burning. Skyrizi Counseling: I discussed with the patient the risks of risankizumab-rzaa including but not limited to immunosuppression, and serious infections.  The patient understands that monitoring is required including a PPD at baseline and must alert us or the primary physician if symptoms of infection or other concerning signs are noted. Sarecycline Counseling: Patient advised regarding possible photosensitivity and discoloration of the teeth, skin, lips, tongue and gums.  Patient instructed to avoid sunlight, if possible.  When exposed to sunlight, patients should wear protective clothing, sunglasses, and sunscreen.  The patient was instructed to call the office immediately if the following severe adverse effects occur:  hearing changes, easy bruising/bleeding, severe headache, or vision changes.  The patient verbalized understanding of the proper use and possible adverse effects of sarecycline.  All of the patient's questions and concerns were addressed. Soolantra Counseling: I discussed with the patients the risks of topial Soolantra. This is a medicine which decreases the number of mites and inflammation in the skin. You experience burning, stinging, eye irritation or allergic reactions.  Please call our office if you develop any problems from using this medication. Acitretin Pregnancy And Lactation Text: This medication is Pregnancy Category X and should not be given to women who are pregnant or may become pregnant in the future. This medication is excreted in breast milk. Otezla Counseling: The side effects of Otezla were discussed with the patient, including but not limited to worsening or new depression, weight loss, diarrhea, nausea, upper respiratory tract infection, and headache. Patient instructed to call the office should any adverse effect occur.  The patient verbalized understanding of the proper use and possible adverse effects of Otezla.  All the patient's questions and concerns were addressed. Benzoyl Peroxide Counseling: Patient counseled that medicine may cause skin irritation and bleach clothing.  In the event of skin irritation, the patient was advised to reduce the amount of the drug applied or use it less frequently.   The patient verbalized understanding of the proper use and possible adverse effects of benzoyl peroxide.  All of the patient's questions and concerns were addressed. Low Dose Naltrexone Pregnancy And Lactation Text: Naltrexone is pregnancy category C.  There have been no adequate and well-controlled studies in pregnant women.  It should be used in pregnancy only if the potential benefit justifies the potential risk to the fetus.   Limited data indicates that naltrexone is minimally excreted into breastmilk. Enbrel Counseling:  I discussed with the patient the risks of etanercept including but not limited to myelosuppression, immunosuppression, autoimmune hepatitis, demyelinating diseases, lymphoma, and infections.  The patient understands that monitoring is required including a PPD at baseline and must alert us or the primary physician if symptoms of infection or other concerning signs are noted. Spironolactone Pregnancy And Lactation Text: This medication can cause feminization of the male fetus and should be avoided during pregnancy. The active metabolite is also found in breast milk. Erythromycin Pregnancy And Lactation Text: This medication is Pregnancy Category B and is considered safe during pregnancy. It is also excreted in breast milk. Dutasteride Male Counseling: Dutasteride Counseling:  I discussed with the patient the risks of use of dutasteride including but not limited to decreased libido, decreased ejaculate volume, and gynecomastia. Women who can become pregnant should not handle medication.  All of the patient's questions and concerns were addressed. Azithromycin Counseling:  I discussed with the patient the risks of azithromycin including but not limited to GI upset, allergic reaction, drug rash, diarrhea, and yeast infections. Dapsone Counseling: I discussed with the patient the risks of dapsone including but not limited to hemolytic anemia, agranulocytosis, rashes, methemoglobinemia, kidney failure, peripheral neuropathy, headaches, GI upset, and liver toxicity.  Patients who start dapsone require monitoring including baseline LFTs and weekly CBCs for the first month, then every month thereafter.  The patient verbalized understanding of the proper use and possible adverse effects of dapsone.  All of the patient's questions and concerns were addressed. Adbry Pregnancy And Lactation Text: It is unknown if this medication will adversely affect pregnancy or breast feeding. Methotrexate Counseling:  Patient counseled regarding adverse effects of methotrexate including but not limited to nausea, vomiting, abnormalities in liver function tests. Patients may develop mouth sores, rash, diarrhea, and abnormalities in blood counts. The patient understands that monitoring is required including LFT's and blood counts.  There is a rare possibility of scarring of the liver and lung problems that can occur when taking methotrexate. Persistent nausea, loss of appetite, pale stools, dark urine, cough, and shortness of breath should be reported immediately. Patient advised to discontinue methotrexate treatment at least three months before attempting to become pregnant.  I discussed the need for folate supplements while taking methotrexate.  These supplements can decrease side effects during methotrexate treatment. The patient verbalized understanding of the proper use and possible adverse effects of methotrexate.  All of the patient's questions and concerns were addressed. Doxepin Pregnancy And Lactation Text: This medication is Pregnancy Category C and it isn't known if it is safe during pregnancy. It is also excreted in breast milk and breast feeding isn't recommended. Tranexamic Acid Counseling:  Patient advised of the small risk of bleeding problems with tranexamic acid. They were also instructed to call if they developed any nausea, vomiting or diarrhea. All of the patient's questions and concerns were addressed. Klisyri Counseling:  I discussed with the patient the risks of Klisyri including but not limited to erythema, scaling, itching, weeping, crusting, and pain. Winlevi Pregnancy And Lactation Text: This medication is considered safe during pregnancy and breastfeeding. Otezla Pregnancy And Lactation Text: This medication is Pregnancy Category C and it isn't known if it is safe during pregnancy. It is unknown if it is excreted in breast milk. Rituxan Counseling:  I discussed with the patient the risks of Rituxan infusions. Side effects can include infusion reactions, severe drug rashes including mucocutaneous reactions, reactivation of latent hepatitis and other infections and rarely progressive multifocal leukoencephalopathy.  All of the patient's questions and concerns were addressed. Sarecycline Pregnancy And Lactation Text: This medication is Pregnancy Category D and not consider safe during pregnancy. It is also excreted in breast milk. Xolair Counseling:  Patient informed of potential adverse effects including but not limited to fever, muscle aches, rash and allergic reactions.  The patient verbalized understanding of the proper use and possible adverse effects of Xolair.  All of the patient's questions and concerns were addressed. Topical Metronidazole Counseling: Metronidazole is a topical antibiotic medication. You may experience burning, stinging, redness, or allergic reactions.  Please call our office if you develop any problems from using this medication. Soolantra Pregnancy And Lactation Text: This medication is Pregnancy Category C. This medication is considered safe during breast feeding. Olumiant Pregnancy And Lactation Text: Based on animal studies, Olumiant may cause embryo-fetal harm when administered to pregnant women.  The medication should not be used in pregnancy.  Breastfeeding is not recommended during treatment. Benzoyl Peroxide Pregnancy And Lactation Text: This medication is Pregnancy Category C. It is unknown if benzoyl peroxide is excreted in breast milk. Opioid Counseling: I discussed with the patient the potential side effects of opioids including but not limited to addiction, altered mental status, and depression. I stressed avoiding alcohol, benzodiazepines, muscle relaxants and sleep aids unless specifically okayed by a physician. The patient verbalized understanding of the proper use and possible adverse effects of opioids. All of the patient's questions and concerns were addressed. They were instructed to flush the remaining pills down the toilet if they did not need them for pain. Protopic Counseling: Patient may experience a mild burning sensation during topical application. Protopic is not approved in children less than 2 years of age. There have been case reports of hematologic and skin malignancies in patients using topical calcineurin inhibitors although causality is questionable. Ketoconazole Counseling:   Patient counseled regarding improving absorption with orange juice.  Adverse effects include but are not limited to breast enlargement, headache, diarrhea, nausea, upset stomach, liver function test abnormalities, taste disturbance, and stomach pain.  There is a rare possibility of liver failure that can occur when taking ketoconazole. The patient understands that monitoring of LFTs may be required, especially at baseline. The patient verbalized understanding of the proper use and possible adverse effects of ketoconazole.  All of the patient's questions and concerns were addressed. Bexarotene Counseling:  I discussed with the patient the risks of bexarotene including but not limited to hair loss, dry lips/skin/eyes, liver abnormalities, hyperlipidemia, pancreatitis, depression/suicidal ideation, photosensitivity, drug rash/allergic reactions, hypothyroidism, anemia, leukopenia, infection, cataracts, and teratogenicity.  Patient understands that they will need regular blood tests to check lipid profile, liver function tests, white blood cell count, thyroid function tests and pregnancy test if applicable. Niacinamide Counseling: I recommended taking niacin or niacinamide, also know as vitamin B3, twice daily. Recent evidence suggests that taking vitamin B3 (500 mg twice daily) can reduce the risk of actinic keratoses and non-melanoma skin cancers. Side effects of vitamin B3 include flushing and headache. Metronidazole Counseling:  I discussed with the patient the risks of metronidazole including but not limited to seizures, nausea/vomiting, a metallic taste in the mouth, nausea/vomiting and severe allergy. Dapsone Pregnancy And Lactation Text: This medication is Pregnancy Category C and is not considered safe during pregnancy or breast feeding. Methotrexate Pregnancy And Lactation Text: This medication is Pregnancy Category X and is known to cause fetal harm. This medication is excreted in breast milk. Bimzelx Counseling:  I discussed with the patient the risks of Bimzelx including but not limited to depression, immunosuppression, allergic reactions and infections.  The patient understands that monitoring is required including a PPD at baseline and must alert us or the primary physician if symptoms of infection or other concerning signs are noted. VTAMA Counseling: I discussed with the patient that VTAMA is not for use in the eyes, mouth or mouth. They should call the office if they develop any signs of allergic reactions to VTAMA. The patient verbalized understanding of the proper use and possible adverse effects of VTAMA.  All of the patient's questions and concerns were addressed. Dutasteride Female Counseling: Dutasteride Counseling:  I discussed with the patient the risks of use of dutasteride including but not limited to decreased libido and sexual dysfunction. Explained the teratogenic nature of the medication and stressed the importance of not getting pregnant during treatment. All of the patient's questions and concerns were addressed. Klisyri Pregnancy And Lactation Text: It is unknown if this medication can harm a developing fetus or if it is excreted in breast milk. Topical Metronidazole Pregnancy And Lactation Text: This medication is Pregnancy Category B and considered safe during pregnancy.  It is also considered safe to use while breastfeeding. Xolair Pregnancy And Lactation Text: This medication is Pregnancy Category B and is considered safe during pregnancy. This medication is excreted in breast milk. Elidel Counseling: Patient may experience a mild burning sensation during topical application. Elidel is not approved in children less than 2 years of age. There have been case reports of hematologic and skin malignancies in patients using topical calcineurin inhibitors although causality is questionable. Azithromycin Pregnancy And Lactation Text: This medication is considered safe during pregnancy and is also secreted in breast milk. Hydroxyzine Counseling: Patient advised that the medication is sedating and not to drive a car after taking this medication.  Patient informed of potential adverse effects including but not limited to dry mouth, urinary retention, and blurry vision.  The patient verbalized understanding of the proper use and possible adverse effects of hydroxyzine.  All of the patient's questions and concerns were addressed. Tranexamic Acid Pregnancy And Lactation Text: It is unknown if this medication is safe during pregnancy or breast feeding. Spevigo Counseling: I discussed with the patient the risks of Spevigo including but not limited to fatigue, nasuea, vomiting, headache, pruritus, urinary tract infection, an infusion related reactions.  The patient understands that monitoring is required including screening for tuberculosis at baseline and yearly screening thereafter while continuing Spevigo therapy. They should contact us if symptoms of infection or other concerning signs are noted. Azathioprine Counseling:  I discussed with the patient the risks of azathioprine including but not limited to myelosuppression, immunosuppression, hepatotoxicity, lymphoma, and infections.  The patient understands that monitoring is required including baseline LFTs, Creatinine, possible TPMP genotyping and weekly CBCs for the first month and then every 2 weeks thereafter.  The patient verbalized understanding of the proper use and possible adverse effects of azathioprine.  All of the patient's questions and concerns were addressed. Oxybutynin Counseling:  I discussed with the patient the risks of oxybutynin including but not limited to skin rash, drowsiness, dry mouth, difficulty urinating, and blurred vision. Rituxan Pregnancy And Lactation Text: This medication is Pregnancy Category C and it isn't know if it is safe during pregnancy. It is unknown if this medication is excreted in breast milk but similar antibodies are known to be excreted. Rinvoq Counseling: I discussed with the patient the risks of Rinvoq therapy including but not limited to upper respiratory tract infections, shingles, cold sores, bronchitis, nausea, cough, fever, acne, and headache. Live vaccines should be avoided.  This medication has been linked to serious infections; higher rate of mortality; malignancy and lymphoproliferative disorders; major adverse cardiovascular events; thrombosis; thrombocytopenia, anemia, and neutropenia; lipid elevations; liver enzyme elevations; and gastrointestinal perforations. Carac Counseling:  I discussed with the patient the risks of Carac including but not limited to erythema, scaling, itching, weeping, crusting, and pain. Topical Retinoid counseling:  Patient advised to apply a pea-sized amount only at bedtime and wait 30 minutes after washing their face before applying.  If too drying, patient may add a non-comedogenic moisturizer. The patient verbalized understanding of the proper use and possible adverse effects of retinoids.  All of the patient's questions and concerns were addressed. Opioid Pregnancy And Lactation Text: These medications can lead to premature delivery and should be avoided during pregnancy. These medications are also present in breast milk in small amounts. Tetracycline Counseling: Patient counseled regarding possible photosensitivity and increased risk for sunburn.  Patient instructed to avoid sunlight, if possible.  When exposed to sunlight, patients should wear protective clothing, sunglasses, and sunscreen.  The patient was instructed to call the office immediately if the following severe adverse effects occur:  hearing changes, easy bruising/bleeding, severe headache, or vision changes.  The patient verbalized understanding of the proper use and possible adverse effects of tetracycline.  All of the patient's questions and concerns were addressed. Patient understands to avoid pregnancy while on therapy due to potential birth defects. Bexarotene Pregnancy And Lactation Text: This medication is Pregnancy Category X and should not be given to women who are pregnant or may become pregnant. This medication should not be used if you are breast feeding. Ketoconazole Pregnancy And Lactation Text: This medication is Pregnancy Category C and it isn't know if it is safe during pregnancy. It is also excreted in breast milk and breast feeding isn't recommended. Metronidazole Pregnancy And Lactation Text: This medication is Pregnancy Category B and considered safe during pregnancy.  It is also excreted in breast milk. Niacinamide Pregnancy And Lactation Text: These medications are considered safe during pregnancy. Humira Counseling:  I discussed with the patient the risks of adalimumab including but not limited to myelosuppression, immunosuppression, autoimmune hepatitis, demyelinating diseases, lymphoma, and serious infections.  The patient understands that monitoring is required including a PPD at baseline and must alert us or the primary physician if symptoms of infection or other concerning signs are noted. Bimzelx Pregnancy And Lactation Text: This medication crosses the placenta and the safety is uncertain during pregnancy. It is unknown if this medication is present in breast milk. Gabapentin Counseling: I discussed with the patient the risks of gabapentin including but not limited to dizziness, somnolence, fatigue and ataxia. Protopic Pregnancy And Lactation Text: This medication is Pregnancy Category C. It is unknown if this medication is excreted in breast milk when applied topically. Prednisone Counseling:  I discussed with the patient the risks of prolonged use of prednisone including but not limited to weight gain, insomnia, osteoporosis, mood changes, diabetes, susceptibility to infection, glaucoma and high blood pressure.  In cases where prednisone use is prolonged, patients should be monitored with blood pressure checks, serum glucose levels and an eye exam.  Additionally, the patient may need to be placed on GI prophylaxis, PCP prophylaxis, and calcium and vitamin D supplementation and/or a bisphosphonate.  The patient verbalized understanding of the proper use and the possible adverse effects of prednisone.  All of the patient's questions and concerns were addressed. Valtrex Counseling: I discussed with the patient the risks of valacyclovir including but not limited to kidney damage, nausea, vomiting and severe allergy.  The patient understands that if the infection seems to be worsening or is not improving, they are to call. Minoxidil Counseling: Minoxidil is a topical medication which can increase blood flow where it is applied. It is uncertain how this medication increases hair growth. Side effects are uncommon and include stinging and allergic reactions. Hydroxyzine Pregnancy And Lactation Text: This medication is not safe during pregnancy and should not be taken. It is also excreted in breast milk and breast feeding isn't recommended. Rinvoq Pregnancy And Lactation Text: Based on animal studies, Rinvoq may cause embryo-fetal harm when administered to pregnant women.  The medication should not be used in pregnancy.  Breastfeeding is not recommended during treatment and for 6 days after the last dose. Dutasteride Pregnancy And Lactation Text: This medication is absolutely contraindicated in women, especially during pregnancy and breast feeding. Feminization of male fetuses is possible if taking while pregnant. Topical Steroids Counseling: I discussed with the patient that prolonged use of topical steroids can result in the increased appearance of superficial blood vessels (telangiectasias), lightening (hypopigmentation) and thinning of the skin (atrophy).  Patient understands to avoid using high potency steroids in skin folds, the groin or the face.  The patient verbalized understanding of the proper use and possible adverse effects of topical steroids.  All of the patient's questions and concerns were addressed. Isotretinoin Counseling: Patient should get monthly blood tests, not donate blood, not drive at night if vision affected, not share medication, and not undergo elective surgery for 6 months after tx completed. Side effects reviewed, pt to contact office should one occur. Siliq Counseling:  I discussed with the patient the risks of Siliq including but not limited to new or worsening depression, suicidal thoughts and behavior, immunosuppression, malignancy, posterior leukoencephalopathy syndrome, and serious infections.  The patient understands that monitoring is required including a PPD at baseline and must alert us or the primary physician if symptoms of infection or other concerning signs are noted. There is also a special program designed to monitor depression which is required with Siliq. Azathioprine Pregnancy And Lactation Text: This medication is Pregnancy Category D and isn't considered safe during pregnancy. It is unknown if this medication is excreted in breast milk. Bactrim Counseling:  I discussed with the patient the risks of sulfa antibiotics including but not limited to GI upset, allergic reaction, drug rash, diarrhea, dizziness, photosensitivity, and yeast infections.  Rarely, more serious reactions can occur including but not limited to aplastic anemia, agranulocytosis, methemoglobinemia, blood dyscrasias, liver or kidney failure, lung infiltrates or desquamative/blistering drug rashes. Nsaids Counseling: NSAID Counseling: I discussed with the patient that NSAIDs should be taken with food. Prolonged use of NSAIDs can result in the development of stomach ulcers.  Patient advised to stop taking NSAIDs if abdominal pain occurs.  The patient verbalized understanding of the proper use and possible adverse effects of NSAIDs.  All of the patient's questions and concerns were addressed. Erivedge Counseling- I discussed with the patient the risks of Erivedge including but not limited to nausea, vomiting, diarrhea, constipation, weight loss, changes in the sense of taste, decreased appetite, muscle spasms, and hair loss.  The patient verbalized understanding of the proper use and possible adverse effects of Erivedge.  All of the patient's questions and concerns were addressed. Minocycline Counseling: Patient advised regarding possible photosensitivity and discoloration of the teeth, skin, lips, tongue and gums.  Patient instructed to avoid sunlight, if possible.  When exposed to sunlight, patients should wear protective clothing, sunglasses, and sunscreen.  The patient was instructed to call the office immediately if the following severe adverse effects occur:  hearing changes, easy bruising/bleeding, severe headache, or vision changes.  The patient verbalized understanding of the proper use and possible adverse effects of minocycline.  All of the patient's questions and concerns were addressed. Spevigo Pregnancy And Lactation Text: The risk during pregnancy and breastfeeding is uncertain with this medication. This medication does cross the placenta. It is unknown if this medication is found in breast milk. Qbrexza Counseling:  I discussed with the patient the risks of Qbrexza including but not limited to headache, mydriasis, blurred vision, dry eyes, nasal dryness, dry mouth, dry throat, dry skin, urinary hesitation, and constipation.  Local skin reactions including erythema, burning, stinging, and itching can also occur. Terbinafine Counseling: Patient counseling regarding adverse effects of terbinafine including but not limited to headache, diarrhea, rash, upset stomach, liver function test abnormalities, itching, taste/smell disturbance, nausea, abdominal pain, and flatulence.  There is a rare possibility of liver failure that can occur when taking terbinafine.  The patient understands that a baseline LFT and kidney function test may be required. The patient verbalized understanding of the proper use and possible adverse effects of terbinafine.  All of the patient's questions and concerns were addressed. Finasteride Male Counseling: Finasteride Counseling:  I discussed with the patient the risks of use of finasteride including but not limited to decreased libido, decreased ejaculate volume, gynecomastia, and depression. Women should not handle medication.  All of the patient's questions and concerns were addressed. Cephalexin Pregnancy And Lactation Text: This medication is Pregnancy Category B and considered safe during pregnancy.  It is also excreted in breast milk but can be used safely for shorter doses. Valtrex Pregnancy And Lactation Text: this medication is Pregnancy Category B and is considered safe during pregnancy. This medication is not directly found in breast milk but it's metabolite acyclovir is present. Cimzia Counseling:  I discussed with the patient the risks of Cimzia including but not limited to immunosuppression, allergic reactions and infections.  The patient understands that monitoring is required including a PPD at baseline and must alert us or the primary physician if symptoms of infection or other concerning signs are noted. Cellcept Counseling:  I discussed with the patient the risks of mycophenolate mofetil including but not limited to infection/immunosuppression, GI upset, hypokalemia, hypercholesterolemia, bone marrow suppression, lymphoproliferative disorders, malignancy, GI ulceration/bleed/perforation, colitis, interstitial lung disease, kidney failure, progressive multifocal leukoencephalopathy, and birth defects.  The patient understands that monitoring is required including a baseline creatinine and regular CBC testing. In addition, patient must alert us immediately if symptoms of infection or other concerning signs are noted. Sotyktu Counseling:  I discussed the most common side effects of Sotyktu including: common cold, sore throat, sinus infections, cold sores, canker sores, folliculitis, and acne.  I also discussed more serious side effects of Sotyktu including but not limited to: serious allergic reactions; increased risk for infections such as TB; cancers such as lymphomas; rhabdomyolysis and elevated CPK; and elevated triglycerides and liver enzymes.  Topical Steroids Applications Pregnancy And Lactation Text: Most topical steroids are considered safe to use during pregnancy and lactation.  Any topical steroid applied to the breast or nipple should be washed off before breastfeeding. High Dose Vitamin A Pregnancy And Lactation Text: High dose vitamin A therapy is contraindicated during pregnancy and breast feeding. Eucrisa Counseling: Patient may experience a mild burning sensation during topical application. Eucrisa is not approved in children less than 3 months of age. Arava Counseling:  Patient counseled regarding adverse effects of Arava including but not limited to nausea, vomiting, abnormalities in liver function tests. Patients may develop mouth sores, rash, diarrhea, and abnormalities in blood counts. The patient understands that monitoring is required including LFTs and blood counts.  There is a rare possibility of scarring of the liver and lung problems that can occur when taking methotrexate. Persistent nausea, loss of appetite, pale stools, dark urine, cough, and shortness of breath should be reported immediately. Patient advised to discontinue Arava treatment and consult with a physician prior to attempting conception. The patient will have to undergo a treatment to eliminate Arava from the body prior to conception. Bactrim Pregnancy And Lactation Text: This medication is Pregnancy Category D and is known to cause fetal risk.  It is also excreted in breast milk. Stelara Counseling:  I discussed with the patient the risks of ustekinumab including but not limited to immunosuppression, malignancy, posterior leukoencephalopathy syndrome, and serious infections.  The patient understands that monitoring is required including a PPD at baseline and must alert us or the primary physician if symptoms of infection or other concerning signs are noted. Albendazole Counseling:  I discussed with the patient the risks of albendazole including but not limited to cytopenia, kidney damage, nausea/vomiting and severe allergy.  The patient understands that this medication is being used in an off-label manner. Calcipotriene Counseling:  I discussed with the patient the risks of calcipotriene including but not limited to erythema, scaling, itching, and irritation. Propranolol Counseling:  I discussed with the patient the risks of propranolol including but not limited to low heart rate, low blood pressure, low blood sugar, restlessness and increased cold sensitivity. They should call the office if they experience any of these side effects. Isotretinoin Pregnancy And Lactation Text: This medication is Pregnancy Category X and is considered extremely dangerous during pregnancy. It is unknown if it is excreted in breast milk. Hyrimoz Counseling:  I discussed with the patient the risks of adalimumab including but not limited to myelosuppression, immunosuppression, autoimmune hepatitis, demyelinating diseases, lymphoma, and serious infections.  The patient understands that monitoring is required including a PPD at baseline and must alert us or the primary physician if symptoms of infection or other concerning signs are noted. Nsaids Pregnancy And Lactation Text: These medications are considered safe up to 30 weeks gestation. It is excreted in breast milk. Tazorac Counseling:  Patient advised that medication is irritating and drying.  Patient may need to apply sparingly and wash off after an hour before eventually leaving it on overnight.  The patient verbalized understanding of the proper use and possible adverse effects of tazorac.  All of the patient's questions and concerns were addressed. Cimzia Pregnancy And Lactation Text: This medication crosses the placenta but can be considered safe in certain situations. Cimzia may be excreted in breast milk. Qbrexza Pregnancy And Lactation Text: There is no available data on Qbrexza use in pregnant women.  There is no available data on Qbrexza use in lactation. Mirvaso Counseling: Mirvaso is a topical medication which can decrease superficial blood flow where applied. Side effects are uncommon and include stinging, redness and allergic reactions. Finasteride Female Counseling: Finasteride Counseling:  I discussed with the patient the risks of use of finasteride including but not limited to decreased libido and sexual dysfunction. Explained the teratogenic nature of the medication and stressed the importance of not getting pregnant during treatment. All of the patient's questions and concerns were addressed. Fluconazole Counseling:  Patient counseled regarding adverse effects of fluconazole including but not limited to headache, diarrhea, nausea, upset stomach, liver function test abnormalities, taste disturbance, and stomach pain.  There is a rare possibility of liver failure that can occur when taking fluconazole.  The patient understands that monitoring of LFTs and kidney function test may be required, especially at baseline. The patient verbalized understanding of the proper use and possible adverse effects of fluconazole.  All of the patient's questions and concerns were addressed. Clindamycin Counseling: I counseled the patient regarding use of clindamycin as an antibiotic for prophylactic and/or therapeutic purposes. Clindamycin is active against numerous classes of bacteria, including skin bacteria. Side effects may include nausea, diarrhea, gastrointestinal upset, rash, hives, yeast infections, and in rare cases, colitis. Cephalexin Counseling: I counseled the patient regarding use of cephalexin as an antibiotic for prophylactic and/or therapeutic purposes. Cephalexin (commonly prescribed under brand name Keflex) is a cephalosporin antibiotic which is active against numerous classes of bacteria, including most skin bacteria. Side effects may include nausea, diarrhea, gastrointestinal upset, rash, hives, yeast infections, and in rare cases, hepatitis, kidney disease, seizures, fever, confusion, neurologic symptoms, and others. Patients with severe allergies to penicillin medications are cautioned that there is about a 10% incidence of cross-reactivity with cephalosporins. When possible, patients with penicillin allergies should use alternatives to cephalosporins for antibiotic therapy. Glycopyrrolate Counseling:  I discussed with the patient the risks of glycopyrrolate including but not limited to skin rash, drowsiness, dry mouth, difficulty urinating, and blurred vision. Propranolol Pregnancy And Lactation Text: This medication is Pregnancy Category C and it isn't known if it is safe during pregnancy. It is excreted in breast milk. Topical Sulfur Applications Counseling: Topical Sulfur Counseling: Patient counseled that this medication may cause skin irritation or allergic reactions.  In the event of skin irritation, the patient was advised to reduce the amount of the drug applied or use it less frequently.   The patient verbalized understanding of the proper use and possible adverse effects of topical sulfur application.  All of the patient's questions and concerns were addressed. Sotyktu Pregnancy And Lactation Text: There is insufficient data to evaluate whether or not Sotyktu is safe to use during pregnancy.   It is not known if Sotyktu passes into breast milk and whether or not it is safe to use when breastfeeding.   Tazorac Pregnancy And Lactation Text: This medication is not safe during pregnancy. It is unknown if this medication is excreted in breast milk. Cibinqo Counseling: I discussed with the patient the risks of Cibinqo therapy including but not limited to common cold, nausea, headache, cold sores, increased blood CPK levels, dizziness, UTIs, fatigue, acne, and vomitting. Live vaccines should be avoided.  This medication has been linked to serious infections; higher rate of mortality; malignancy and lymphoproliferative disorders; major adverse cardiovascular events; thrombosis; thrombocytopenia and lymphopenia; lipid elevations; and retinal detachment. Calcipotriene Pregnancy And Lactation Text: The use of this medication during pregnancy or lactation is not recommended as there is insufficient data. Rhofade Counseling: Rhofade is a topical medication which can decrease superficial blood flow where applied. Side effects are uncommon and include stinging, redness and allergic reactions. Aklief counseling:  Patient advised to apply a pea-sized amount only at bedtime and wait 30 minutes after washing their face before applying.  If too drying, patient may add a non-comedogenic moisturizer.  The most commonly reported side effects including irritation, redness, scaling, dryness, stinging, burning, itching, and increased risk of sunburn.  The patient verbalized understanding of the proper use and possible adverse effects of retinoids.  All of the patient's questions and concerns were addressed. Olanzapine Counseling- I discussed with the patient the common side effects of olanzapine including but are not limited to: lack of energy, dry mouth, increased appetite, sleepiness, tremor, constipation, dizziness, changes in behavior, or restlessness.  Explained that teenagers are more likely to experience headaches, abdominal pain, pain in the arms or legs, tiredness, and sleepiness.  Serious side effects include but are not limited: increased risk of death in elderly patients who are confused, have memory loss, or dementia-related psychosis; hyperglycemia; increased cholesterol and triglycerides; and weight gain. High Dose Vitamin A Counseling: Side effects reviewed, pt to contact office should one occur. Libtayo Counseling- I discussed with the patient the risks of Libtayo including but not limited to nausea, vomiting, diarrhea, and bone or muscle pain.  The patient verbalized understanding of the proper use and possible adverse effects of Libtayo.  All of the patient's questions and concerns were addressed. Quinolones Counseling:  I discussed with the patient the risks of fluoroquinolones including but not limited to GI upset, allergic reaction, drug rash, diarrhea, dizziness, photosensitivity, yeast infections, liver function test abnormalities, tendonitis/tendon rupture. Cosentyx Counseling:  I discussed with the patient the risks of Cosentyx including but not limited to worsening of Crohn's disease, immunosuppression, allergic reactions and infections.  The patient understands that monitoring is required including a PPD at baseline and must alert us or the primary physician if symptoms of infection or other concerning signs are noted. Clindamycin Pregnancy And Lactation Text: This medication can be used in pregnancy if certain situations. Clindamycin is also present in breast milk. Detail Level: Simple Finasteride Pregnancy And Lactation Text: This medication is absolutely contraindicated during pregnancy. It is unknown if it is excreted in breast milk. Glycopyrrolate Pregnancy And Lactation Text: This medication is Pregnancy Category B and is considered safe during pregnancy. It is unknown if it is excreted breast milk.

## 2024-11-18 NOTE — ED ADULT TRIAGE NOTE - CHIEF COMPLAINT QUOTE
As per guardian, patient is more confused, has hx of strokes, is alert and oriented x2, last known well was yesterday at 6pm took patient to critical care section. decreased mi/increased time in double stance/decreased step length

## 2024-12-10 NOTE — PATIENT PROFILE ADULT - FOOD INSECURITY
Medicare Annual Wellness Visit    Joseph Emanuel is here for Medicare AWV    Assessment & Plan   Medicare annual wellness visit, subsequent    Recommendations for Preventive Services Due: see orders and patient instructions/AVS.  Recommended screening schedule for the next 5-10 years is provided to the patient in written form: see Patient Instructions/AVS.     No follow-ups on file.     Subjective     Reports shingrix done in [place].    Patient's complete Health Risk Assessment and screening values have been reviewed and are found in Flowsheets. The following problems were reviewed today and where indicated follow up appointments were made and/or referrals ordered.    No Positive Risk Factors identified today.                                    Objective   Vitals:    12/10/24 0928   BP: 126/79   Pulse: 72   SpO2: 98%   Weight: 84.4 kg (186 lb)   Height: 1.778 m (5' 10\")      Body mass index is 26.69 kg/m².        General Appearance: alert and oriented to person, place and time, well-developed and well-nourished, in no acute distress  Neck: neck supple and non tender without mass, no thyromegaly or thyroid nodules, no cervical lymphadenopathy   Pulmonary/Chest: clear to auscultation bilaterally- no wheezes, rales or rhonchi, normal air movement, no respiratory distress  Cardiovascular: normal rate, normal S1 and S2, no gallops, intact distal pulses, and no carotid bruits            Allergies   Allergen Reactions    Codeine     Norco [Hydrocodone-Acetaminophen] Nausea And Vomiting     Prior to Visit Medications    Medication Sig Taking? Authorizing Provider   Melatonin 10 MG TABS Take by mouth Yes Johana Garcia MD   Vitamin D (CHOLECALCIFEROL) 1000 UNITS CAPS capsule Take 1 capsule by mouth daily Yes Johana Garcia MD   multivitamin (THERAGRAN) per tablet Take 1 tablet by mouth daily Yes Johana Garcia MD       CareTeam (Including outside providers/suppliers regularly involved in providing care):  no

## 2024-12-26 NOTE — END OF VISIT
[] : Fellow [FreeTextEntry3] : Lung nodule mass  Robotic CT protocol for possible RAB and bx  PFTs  PET CT  1 month follow up  REQUIRED- Click to run Sepsis Recognition Calculator

## 2025-01-15 ENCOUNTER — EMERGENCY (EMERGENCY)
Facility: HOSPITAL | Age: 75
LOS: 0 days | Discharge: HOME | End: 2025-01-15
Attending: EMERGENCY MEDICINE
Payer: MEDICARE

## 2025-01-15 VITALS
RESPIRATION RATE: 20 BRPM | SYSTOLIC BLOOD PRESSURE: 182 MMHG | DIASTOLIC BLOOD PRESSURE: 97 MMHG | OXYGEN SATURATION: 97 % | TEMPERATURE: 99 F | HEART RATE: 57 BPM
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
## 2025-01-15 DIAGNOSIS — G20.A1 PARKINSON'S DISEASE WITHOUT DYSKINESIA, WITHOUT MENTION OF FLUCTUATIONS: ICD-10-CM

## 2025-01-15 DIAGNOSIS — E11.9 TYPE 2 DIABETES MELLITUS WITHOUT COMPLICATIONS: ICD-10-CM

## 2025-01-15 DIAGNOSIS — Z88.0 ALLERGY STATUS TO PENICILLIN: ICD-10-CM

## 2025-01-15 DIAGNOSIS — S09.90XA UNSPECIFIED INJURY OF HEAD, INITIAL ENCOUNTER: ICD-10-CM
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
## 2025-01-15 DIAGNOSIS — Z88.2 ALLERGY STATUS TO SULFONAMIDES: ICD-10-CM

## 2025-01-15 DIAGNOSIS — Z90.49 ACQUIRED ABSENCE OF OTHER SPECIFIED PARTS OF DIGESTIVE TRACT: Chronic | ICD-10-CM

## 2025-01-15 DIAGNOSIS — R53.1 WEAKNESS: ICD-10-CM

## 2025-01-15 DIAGNOSIS — Z88.1 ALLERGY STATUS TO OTHER ANTIBIOTIC AGENTS: ICD-10-CM

## 2025-01-15 DIAGNOSIS — I10 ESSENTIAL (PRIMARY) HYPERTENSION: ICD-10-CM

## 2025-01-15 DIAGNOSIS — Z87.442 PERSONAL HISTORY OF URINARY CALCULI: ICD-10-CM

## 2025-01-15 DIAGNOSIS — R00.1 BRADYCARDIA, UNSPECIFIED: ICD-10-CM

## 2025-01-15 DIAGNOSIS — G40.909 EPILEPSY, UNSPECIFIED, NOT INTRACTABLE, WITHOUT STATUS EPILEPTICUS: ICD-10-CM

## 2025-01-15 LAB
ALBUMIN SERPL ELPH-MCNC: 3.9 G/DL — SIGNIFICANT CHANGE UP (ref 3.5–5.2)
ALP SERPL-CCNC: 74 U/L — SIGNIFICANT CHANGE UP (ref 30–115)
ALT FLD-CCNC: <5 U/L — SIGNIFICANT CHANGE UP (ref 0–41)
ANION GAP SERPL CALC-SCNC: 6 MMOL/L — LOW (ref 7–14)
AST SERPL-CCNC: 21 U/L — SIGNIFICANT CHANGE UP (ref 0–41)
BASOPHILS # BLD AUTO: 0.02 K/UL — SIGNIFICANT CHANGE UP (ref 0–0.2)
BASOPHILS NFR BLD AUTO: 0.3 % — SIGNIFICANT CHANGE UP (ref 0–1)
BILIRUB SERPL-MCNC: 0.8 MG/DL — SIGNIFICANT CHANGE UP (ref 0.2–1.2)
BUN SERPL-MCNC: 13 MG/DL — SIGNIFICANT CHANGE UP (ref 10–20)
CALCIUM SERPL-MCNC: 8.7 MG/DL — SIGNIFICANT CHANGE UP (ref 8.4–10.5)
CHLORIDE SERPL-SCNC: 108 MMOL/L — SIGNIFICANT CHANGE UP (ref 98–110)
CO2 SERPL-SCNC: 28 MMOL/L — SIGNIFICANT CHANGE UP (ref 17–32)
CREAT SERPL-MCNC: 1.1 MG/DL — SIGNIFICANT CHANGE UP (ref 0.7–1.5)
EGFR: 71 ML/MIN/1.73M2 — SIGNIFICANT CHANGE UP
EOSINOPHIL # BLD AUTO: 0.07 K/UL — SIGNIFICANT CHANGE UP (ref 0–0.7)
EOSINOPHIL NFR BLD AUTO: 0.9 % — SIGNIFICANT CHANGE UP (ref 0–8)
GLUCOSE SERPL-MCNC: 75 MG/DL — SIGNIFICANT CHANGE UP (ref 70–99)
HCT VFR BLD CALC: 44.4 % — SIGNIFICANT CHANGE UP (ref 42–52)
HGB BLD-MCNC: 15.1 G/DL — SIGNIFICANT CHANGE UP (ref 14–18)
IMM GRANULOCYTES NFR BLD AUTO: 0.4 % — HIGH (ref 0.1–0.3)
LYMPHOCYTES # BLD AUTO: 1.81 K/UL — SIGNIFICANT CHANGE UP (ref 1.2–3.4)
LYMPHOCYTES # BLD AUTO: 24.3 % — SIGNIFICANT CHANGE UP (ref 20.5–51.1)
MAGNESIUM SERPL-MCNC: 1.8 MG/DL — SIGNIFICANT CHANGE UP (ref 1.8–2.4)
MCHC RBC-ENTMCNC: 34 G/DL — SIGNIFICANT CHANGE UP (ref 32–37)
MCHC RBC-ENTMCNC: 34.1 PG — HIGH (ref 27–31)
MCV RBC AUTO: 100.2 FL — HIGH (ref 80–94)
MONOCYTES # BLD AUTO: 0.57 K/UL — SIGNIFICANT CHANGE UP (ref 0.1–0.6)
MONOCYTES NFR BLD AUTO: 7.6 % — SIGNIFICANT CHANGE UP (ref 1.7–9.3)
NEUTROPHILS # BLD AUTO: 4.96 K/UL — SIGNIFICANT CHANGE UP (ref 1.4–6.5)
NEUTROPHILS NFR BLD AUTO: 66.5 % — SIGNIFICANT CHANGE UP (ref 42.2–75.2)
NRBC # BLD: 0 /100 WBCS — SIGNIFICANT CHANGE UP (ref 0–0)
PLATELET # BLD AUTO: 139 K/UL — SIGNIFICANT CHANGE UP (ref 130–400)
PMV BLD: 9.7 FL — SIGNIFICANT CHANGE UP (ref 7.4–10.4)
POTASSIUM SERPL-MCNC: 4.7 MMOL/L — SIGNIFICANT CHANGE UP (ref 3.5–5)
POTASSIUM SERPL-SCNC: 4.7 MMOL/L — SIGNIFICANT CHANGE UP (ref 3.5–5)
PROT SERPL-MCNC: 5.8 G/DL — LOW (ref 6–8)
RBC # BLD: 4.43 M/UL — LOW (ref 4.7–6.1)
RBC # FLD: 11.9 % — SIGNIFICANT CHANGE UP (ref 11.5–14.5)
SODIUM SERPL-SCNC: 142 MMOL/L — SIGNIFICANT CHANGE UP (ref 135–146)
TROPONIN T, HIGH SENSITIVITY RESULT: 11 NG/L — SIGNIFICANT CHANGE UP (ref 6–21)
TROPONIN T, HIGH SENSITIVITY RESULT: 9 NG/L — SIGNIFICANT CHANGE UP (ref 6–21)
WBC # BLD: 7.46 K/UL — SIGNIFICANT CHANGE UP (ref 4.8–10.8)
WBC # FLD AUTO: 7.46 K/UL — SIGNIFICANT CHANGE UP (ref 4.8–10.8)

## 2025-01-15 PROCEDURE — 84484 ASSAY OF TROPONIN QUANT: CPT

## 2025-01-15 PROCEDURE — 71045 X-RAY EXAM CHEST 1 VIEW: CPT

## 2025-01-15 PROCEDURE — 71045 X-RAY EXAM CHEST 1 VIEW: CPT | Mod: 26

## 2025-01-15 PROCEDURE — 93005 ELECTROCARDIOGRAM TRACING: CPT

## 2025-01-15 PROCEDURE — 80053 COMPREHEN METABOLIC PANEL: CPT

## 2025-01-15 PROCEDURE — 36000 PLACE NEEDLE IN VEIN: CPT

## 2025-01-15 PROCEDURE — 99285 EMERGENCY DEPT VISIT HI MDM: CPT | Mod: 25

## 2025-01-15 PROCEDURE — 36415 COLL VENOUS BLD VENIPUNCTURE: CPT

## 2025-01-15 PROCEDURE — 70450 CT HEAD/BRAIN W/O DYE: CPT | Mod: MC

## 2025-01-15 PROCEDURE — 70450 CT HEAD/BRAIN W/O DYE: CPT | Mod: 26

## 2025-01-15 PROCEDURE — 93010 ELECTROCARDIOGRAM REPORT: CPT

## 2025-01-15 PROCEDURE — 83735 ASSAY OF MAGNESIUM: CPT

## 2025-01-15 PROCEDURE — 85025 COMPLETE CBC W/AUTO DIFF WBC: CPT

## 2025-01-15 PROCEDURE — 99285 EMERGENCY DEPT VISIT HI MDM: CPT | Mod: FS

## 2025-01-15 RX ORDER — SODIUM CHLORIDE 9 MG/ML
1000 INJECTION, SOLUTION INTRAVENOUS ONCE
Refills: 0 | Status: COMPLETED | OUTPATIENT
Start: 2025-01-15 | End: 2025-01-15

## 2025-01-15 RX ADMIN — SODIUM CHLORIDE 1000 MILLILITER(S): 9 INJECTION, SOLUTION INTRAVENOUS at 11:39

## 2025-01-15 NOTE — ED ADULT NURSE NOTE - CHIEF COMPLAINT QUOTE
BIBA from [facility] s/p trip and fall around 5 AM this morning, hit the side of his head, denies A/C use, denies LOC, pt was able to get up after the fall. c/o weakness x2 days

## 2025-01-15 NOTE — ED PROVIDER NOTE - CLINICAL SUMMARY MEDICAL DECISION MAKING FREE TEXT BOX
VSS.  No distress.  Imaging negative for acute pathology.  Labs reassuring.  DC home.  Strict return instructions discussed.

## 2025-01-15 NOTE — ED PROVIDER NOTE - NSFOLLOWUPINSTRUCTIONS_ED_ALL_ED_FT
Follow up with PMD.    Closed Head Injury    Closed head injury in an injury to your head that may or may not involve a traumatic brain injury (TBI). Symptoms of TBI can be short or long lasting and include headache, dizziness, interference with memory or speech, fatigue, confusion, changes in sleep, mood changes, nausea, depression/anxiety, and dulling of senses. Make sure to obtain proper rest which includes getting plenty of sleep, avoiding excessive visual stimulation, and avoiding activities that may cause physical or mental stress. Avoid any situation where there is potential for another head injury including sports.    SEEK MEDICAL CARE IF YOU HAVE THE FOLLOWING SYMPTOMS: unusual drowsiness, vomiting, severe dizziness, seizures, lightheadedness, muscular weakness, different pupil sizes, visual changes, or clear or bloody discharge from your ears or nose.

## 2025-01-15 NOTE — ED ADULT TRIAGE NOTE - CHIEF COMPLAINT QUOTE
BIBA from [facility] s/p trip and fall around 5 AM this morning, hit the side of his head, denies A/C use, denies LOC, pt was able to get up after the fall. c/o weakness x2 days

## 2025-01-15 NOTE — ED PROVIDER NOTE - PATIENT PORTAL LINK FT
You can access the FollowMyHealth Patient Portal offered by [facility] by registering at the following website: [email or web address]. By joining [employer]’s FollowMyHealth portal, you will also be able to view your health information using other applications (apps) compatible with our system.

## 2025-01-15 NOTE — ED ADULT NURSE NOTE - NSFALLHARMRISKINTERV_ED_ALL_ED
Assistance OOB with selected safe patient handling equipment if applicable/Communicate risk of Fall with Harm to all staff, patient, and family/Provide visual cue: red socks, yellow wristband, yellow gown, etc/Reinforce activity limits and safety measures with patient and family/Bed in lowest position, wheels locked, appropriate side rails in place/Call bell, personal items and telephone in reach/Instruct patient to call for assistance before getting out of bed/chair/stretcher/Non-slip footwear applied when patient is off stretcher/[place] to call system/Physically safe environment - no spills, clutter or unnecessary equipment/Purposeful Proactive Rounding/Room/bathroom lighting operational, light cord in reach

## 2025-01-15 NOTE — ED PROVIDER NOTE - PHYSICAL EXAMINATION
CONST: NAD  EYES: PERRL, EOMI, Sclera and conjunctiva clear.   ENT: No nasal discharge. Oropharynx normal appearing  NECK: Non-tender, no meningeal signs. normal ROM. supple   CARD: S1 S2; No jvd  RESP: Equal BS B/L, No wheezes, rhonchi or rales. No distress  GI: Soft, non-tender, non-distended. no cva tenderness. normal BS  MS: Normal ROM in all extremities. pulses 2 +. no calf tenderness or swelling  SKIN: Warm, dry, no acute rashes. Good turgor  NEURO: A&Ox3, No focal deficits. Strength 5/5 with no sensory deficits. Finger to nose intact. Negative pronator drift

## 2025-01-15 NOTE — ED PROVIDER NOTE - OBJECTIVE STATEMENT
74-year-old male past medical history of Parkinson's, hypertension, CVA, seizure disorder, diabetes, kidney stones presents for evaluation status post fall.  Patient endorses generalized weakness for the past couple days and when he tried to go to the restroom at 5 AM this morning was very weak and fell backwards hitting the back of his head on the ground requiring help to get up.  Now presents with continued generalized weakness, no inciting relieving factors.  Denies fever, headache, chest pain, shortness of breath, abdominal pain, dysuria, hematuria, vomiting, diarrhea, LOC, AC.

## 2025-01-16 ENCOUNTER — INPATIENT (INPATIENT)
Facility: HOSPITAL | Age: 75
LOS: 7 days | Discharge: HOME | DRG: 999 | End: 2025-01-24
Attending: PSYCHIATRY & NEUROLOGY | Admitting: HOSPITALIST
Payer: MEDICARE

## 2025-01-16 VITALS
DIASTOLIC BLOOD PRESSURE: 96 MMHG | RESPIRATION RATE: 18 BRPM | OXYGEN SATURATION: 98 % | HEART RATE: 78 BPM | WEIGHT: 154.1 LBS | TEMPERATURE: 98 F | SYSTOLIC BLOOD PRESSURE: 161 MMHG
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
## 2025-01-16 DIAGNOSIS — Z90.49 ACQUIRED ABSENCE OF OTHER SPECIFIED PARTS OF DIGESTIVE TRACT: Chronic | ICD-10-CM

## 2025-01-16 LAB
ALBUMIN SERPL ELPH-MCNC: 3.7 G/DL — SIGNIFICANT CHANGE UP (ref 3.5–5.2)
ALP SERPL-CCNC: 73 U/L — SIGNIFICANT CHANGE UP (ref 30–115)
ALT FLD-CCNC: <5 U/L — SIGNIFICANT CHANGE UP (ref 0–41)
ANION GAP SERPL CALC-SCNC: 10 MMOL/L — SIGNIFICANT CHANGE UP (ref 7–14)
APPEARANCE UR: CLEAR — SIGNIFICANT CHANGE UP
AST SERPL-CCNC: 21 U/L — SIGNIFICANT CHANGE UP (ref 0–41)
BACTERIA # UR AUTO: NEGATIVE /HPF — SIGNIFICANT CHANGE UP
BASOPHILS # BLD AUTO: 0.02 K/UL — SIGNIFICANT CHANGE UP (ref 0–0.2)
BASOPHILS NFR BLD AUTO: 0.2 % — SIGNIFICANT CHANGE UP (ref 0–1)
BILIRUB SERPL-MCNC: 1.1 MG/DL — SIGNIFICANT CHANGE UP (ref 0.2–1.2)
BILIRUB UR-MCNC: ABNORMAL
BUN SERPL-MCNC: 15 MG/DL — SIGNIFICANT CHANGE UP (ref 10–20)
CALCIUM SERPL-MCNC: 8.7 MG/DL — SIGNIFICANT CHANGE UP (ref 8.4–10.5)
CAST: 3 /LPF — SIGNIFICANT CHANGE UP (ref 0–4)
CHLORIDE SERPL-SCNC: 106 MMOL/L — SIGNIFICANT CHANGE UP (ref 98–110)
CO2 SERPL-SCNC: 26 MMOL/L — SIGNIFICANT CHANGE UP (ref 17–32)
COLOR SPEC: SIGNIFICANT CHANGE UP
CREAT SERPL-MCNC: 1.1 MG/DL — SIGNIFICANT CHANGE UP (ref 0.7–1.5)
DIFF PNL FLD: NEGATIVE — SIGNIFICANT CHANGE UP
EGFR: 71 ML/MIN/1.73M2 — SIGNIFICANT CHANGE UP
EOSINOPHIL # BLD AUTO: 0.04 K/UL — SIGNIFICANT CHANGE UP (ref 0–0.7)
EOSINOPHIL NFR BLD AUTO: 0.5 % — SIGNIFICANT CHANGE UP (ref 0–8)
GLUCOSE SERPL-MCNC: 141 MG/DL — HIGH (ref 70–99)
GLUCOSE UR QL: NEGATIVE MG/DL — SIGNIFICANT CHANGE UP
HCT VFR BLD CALC: 44.8 % — SIGNIFICANT CHANGE UP (ref 42–52)
HGB BLD-MCNC: 15.5 G/DL — SIGNIFICANT CHANGE UP (ref 14–18)
IMM GRANULOCYTES NFR BLD AUTO: 0.2 % — SIGNIFICANT CHANGE UP (ref 0.1–0.3)
KETONES UR-MCNC: 40 MG/DL
LEUKOCYTE ESTERASE UR-ACNC: ABNORMAL
LYMPHOCYTES # BLD AUTO: 1.66 K/UL — SIGNIFICANT CHANGE UP (ref 1.2–3.4)
LYMPHOCYTES # BLD AUTO: 19.3 % — LOW (ref 20.5–51.1)
MCHC RBC-ENTMCNC: 34.2 PG — HIGH (ref 27–31)
MCHC RBC-ENTMCNC: 34.6 G/DL — SIGNIFICANT CHANGE UP (ref 32–37)
MCV RBC AUTO: 98.9 FL — HIGH (ref 80–94)
MONOCYTES # BLD AUTO: 0.68 K/UL — HIGH (ref 0.1–0.6)
MONOCYTES NFR BLD AUTO: 7.9 % — SIGNIFICANT CHANGE UP (ref 1.7–9.3)
NEUTROPHILS # BLD AUTO: 6.19 K/UL — SIGNIFICANT CHANGE UP (ref 1.4–6.5)
NEUTROPHILS NFR BLD AUTO: 71.9 % — SIGNIFICANT CHANGE UP (ref 42.2–75.2)
NITRITE UR-MCNC: NEGATIVE — SIGNIFICANT CHANGE UP
NRBC # BLD: 0 /100 WBCS — SIGNIFICANT CHANGE UP (ref 0–0)
NRBC BLD-RTO: 0 /100 WBCS — SIGNIFICANT CHANGE UP (ref 0–0)
PH UR: 5.5 — SIGNIFICANT CHANGE UP (ref 5–8)
PLATELET # BLD AUTO: 136 K/UL — SIGNIFICANT CHANGE UP (ref 130–400)
PMV BLD: 9.3 FL — SIGNIFICANT CHANGE UP (ref 7.4–10.4)
POTASSIUM SERPL-MCNC: 4.3 MMOL/L — SIGNIFICANT CHANGE UP (ref 3.5–5)
POTASSIUM SERPL-SCNC: 4.3 MMOL/L — SIGNIFICANT CHANGE UP (ref 3.5–5)
PROT SERPL-MCNC: 5.8 G/DL — LOW (ref 6–8)
PROT UR-MCNC: 100 MG/DL
RBC # BLD: 4.53 M/UL — LOW (ref 4.7–6.1)
RBC # FLD: 11.9 % — SIGNIFICANT CHANGE UP (ref 11.5–14.5)
RBC CASTS # UR COMP ASSIST: 8 /HPF — HIGH (ref 0–4)
SODIUM SERPL-SCNC: 142 MMOL/L — SIGNIFICANT CHANGE UP (ref 135–146)
SP GR SPEC: 1.03 — SIGNIFICANT CHANGE UP (ref 1–1.03)
SQUAMOUS # UR AUTO: 1 /HPF — SIGNIFICANT CHANGE UP (ref 0–5)
UROBILINOGEN FLD QL: 1 MG/DL — SIGNIFICANT CHANGE UP (ref 0.2–1)
WBC # BLD: 8.61 K/UL — SIGNIFICANT CHANGE UP (ref 4.8–10.8)
WBC # FLD AUTO: 8.61 K/UL — SIGNIFICANT CHANGE UP (ref 4.8–10.8)
WBC UR QL: 4 /HPF — SIGNIFICANT CHANGE UP (ref 0–5)

## 2025-01-16 PROCEDURE — 99285 EMERGENCY DEPT VISIT HI MDM: CPT | Mod: GC

## 2025-01-16 PROCEDURE — 70450 CT HEAD/BRAIN W/O DYE: CPT | Mod: MC

## 2025-01-16 PROCEDURE — 84436 ASSAY OF TOTAL THYROXINE: CPT

## 2025-01-16 PROCEDURE — 0241U: CPT

## 2025-01-16 PROCEDURE — 93306 TTE W/DOPPLER COMPLETE: CPT

## 2025-01-16 PROCEDURE — 92523 SPEECH SOUND LANG COMPREHEN: CPT | Mod: GN

## 2025-01-16 PROCEDURE — C1764: CPT

## 2025-01-16 PROCEDURE — 70551 MRI BRAIN STEM W/O DYE: CPT | Mod: MC

## 2025-01-16 PROCEDURE — 97116 GAIT TRAINING THERAPY: CPT | Mod: GP

## 2025-01-16 PROCEDURE — 84443 ASSAY THYROID STIM HORMONE: CPT

## 2025-01-16 PROCEDURE — 70498 CT ANGIOGRAPHY NECK: CPT | Mod: MC

## 2025-01-16 PROCEDURE — 82962 GLUCOSE BLOOD TEST: CPT

## 2025-01-16 PROCEDURE — 99223 1ST HOSP IP/OBS HIGH 75: CPT

## 2025-01-16 PROCEDURE — 95819 EEG AWAKE AND ASLEEP: CPT

## 2025-01-16 PROCEDURE — 92610 EVALUATE SWALLOWING FUNCTION: CPT | Mod: GN

## 2025-01-16 PROCEDURE — 97530 THERAPEUTIC ACTIVITIES: CPT | Mod: GO

## 2025-01-16 PROCEDURE — 97166 OT EVAL MOD COMPLEX 45 MIN: CPT | Mod: GO

## 2025-01-16 PROCEDURE — 72170 X-RAY EXAM OF PELVIS: CPT | Mod: 26

## 2025-01-16 PROCEDURE — 93010 ELECTROCARDIOGRAM REPORT: CPT | Mod: 76

## 2025-01-16 PROCEDURE — 80053 COMPREHEN METABOLIC PANEL: CPT

## 2025-01-16 PROCEDURE — 70496 CT ANGIOGRAPHY HEAD: CPT | Mod: MC

## 2025-01-16 PROCEDURE — 84100 ASSAY OF PHOSPHORUS: CPT

## 2025-01-16 PROCEDURE — 83036 HEMOGLOBIN GLYCOSYLATED A1C: CPT

## 2025-01-16 PROCEDURE — 97162 PT EVAL MOD COMPLEX 30 MIN: CPT | Mod: GP

## 2025-01-16 PROCEDURE — 80061 LIPID PANEL: CPT

## 2025-01-16 PROCEDURE — 85027 COMPLETE CBC AUTOMATED: CPT

## 2025-01-16 PROCEDURE — 36415 COLL VENOUS BLD VENIPUNCTURE: CPT

## 2025-01-16 PROCEDURE — 71045 X-RAY EXAM CHEST 1 VIEW: CPT | Mod: 26

## 2025-01-16 PROCEDURE — 82306 VITAMIN D 25 HYDROXY: CPT

## 2025-01-16 PROCEDURE — 83735 ASSAY OF MAGNESIUM: CPT

## 2025-01-16 PROCEDURE — 33285 INSJ SUBQ CAR RHYTHM MNTR: CPT

## 2025-01-16 PROCEDURE — 80048 BASIC METABOLIC PNL TOTAL CA: CPT

## 2025-01-16 PROCEDURE — 92507 TX SP LANG VOICE COMM INDIV: CPT | Mod: GN

## 2025-01-16 PROCEDURE — 97110 THERAPEUTIC EXERCISES: CPT | Mod: GP

## 2025-01-16 PROCEDURE — 97535 SELF CARE MNGMENT TRAINING: CPT | Mod: GO

## 2025-01-16 PROCEDURE — 85025 COMPLETE CBC W/AUTO DIFF WBC: CPT

## 2025-01-16 RX ORDER — SODIUM CHLORIDE 9 G/ML
1000 INJECTION, SOLUTION INTRAVENOUS ONCE
Refills: 0 | Status: COMPLETED | OUTPATIENT
Start: 2025-01-16 | End: 2025-01-16

## 2025-01-16 RX ADMIN — SODIUM CHLORIDE 1000 MILLILITER(S): 9 INJECTION, SOLUTION INTRAVENOUS at 16:40

## 2025-01-16 NOTE — ED ADULT NURSE NOTE - NSFALLHARMRISKINTERV_ED_ALL_ED
Assistance OOB with selected safe patient handling equipment if applicable/Assistance with ambulation/Communicate risk of Fall with Harm to all staff, patient, and family/Monitor for mental status changes and reorient to person, place, and time, as needed/Move patient closer to nursing station/within visual sight of ED staff/Provide visual cue: red socks, yellow wristband, yellow gown, etc/Reinforce activity limits and safety measures with patient and family/Toileting schedule using arm’s reach rule for commode and bathroom/Use of alarms - bed, stretcher, chair and/or video monitoring/Bed in lowest position, wheels locked, appropriate side rails in place/Call bell, personal items and telephone in reach/Instruct patient to call for assistance before getting out of bed/chair/stretcher/Non-slip footwear applied when patient is off stretcher/[place] to call system/Physically safe environment - no spills, clutter or unnecessary equipment/Purposeful Proactive Rounding/Room/bathroom lighting operational, light cord in reach

## 2025-01-16 NOTE — ED PROVIDER NOTE - OBJECTIVE STATEMENT
74-year-old male past medical history of Parkinson's, hypertension, CVA, seizure disorder, diabetes, kidney stones who presents for confusion and multiple falls, decreased p.o. intake. Per [facility], patient has had multiple falls over the past few days. Had two witnessed falls today where he was weak and slumped to the ground. No LOC. Per staff, he is normally alert and oriented x 3 and able to perform ADLs, but for the past few days has been increasingly confused. He was seen in the ED yesterday for the same complaints, had a negative work up and was sent back.

## 2025-01-16 NOTE — ED PROVIDER NOTE - CLINICAL SUMMARY MEDICAL DECISION MAKING FREE TEXT BOX
74-year-old man, history of Parkinson's disease, hypertension, CVA, seizure disorder presents for increasingly frequent falls and confusion.  Patient is a poor historian and cannot give further history, per facility staff, patient has fallen multiple times over the last couple of days and has appeared weak.  They report that his baseline is alert and oriented and independent, however he seems to be declining.  He was seen in the ED yesterday, workup was reassuring, he was discharged, but sent back this morning.  He is nontoxic-appearing.  Cooperative.  NCAT, neck supple, lungs clear, CV S1-S2, abdomen soft, nontender.  Follows simple commands, nonfocal but confused.  Labs okay, no UTI.  Chest and pelvis x-ray with DJD but no acute findings.  CT head was -1/15.  Given relatively acute decline, patient does not appropriate for the level of care provided's facility.  Needs more workup to elicit acute etiology for symptoms, however it does not appear traumatic and no acute infectious etiology for now.  Will admit for further eval and case management.

## 2025-01-16 NOTE — H&P ADULT - ATTENDING COMMENTS
A 74-year-old male with a past medical history of Parkinson's dementia, hypertension, CVA, seizure disorder, diabetes mellitus type 2, and kidney stones presents with confusion, multiple falls, and decreased oral intake. His assisted living facility reports multiple falls over the past few days, including two witnessed falls today where he presented with weakness and slumped to the ground without loss of consciousness. While normally alert and oriented x3 and independent with ADLs, he has become increasingly confused over the past few days. He was evaluated in the ED yesterday for the same complaint; his workup was negative, and he was discharged.    Vital Signs (Last 24 Hours):    Temperature: 97.9-98.2°F (36.6-36.8°C).  Heart Rate: 62-78 bpm.  Blood Pressure: 161/96 - 167/91 mmHg.  Respiratory Rate: 18 breaths per minute.  SpO2: 98% on room air.  Head CT: No acute intracranial pathology. No midline shift, mass effect, or intracranial hemorrhage identified. Moderate chronic microvascular changes and multiple chronic lacunar infarcts are noted.    Assessment: Suspected early failure to thrive secondary to advanced Parkinson's dementia, complicated by recurrent falls and decreased oral intake.    Plan:  Diagnostics: Orthostatic vital signs; TSH and T4 levels. Urinalysis unremarkable. Continuous telemetry monitoring for arrhythmic events.      Management: Implement fall precautions. One-to-one feeding assistance with calorie counts. Continue home medications: carbidopa/levodopa (Sinemet), entacapone, rivastigmine, statin, aspirin, levetiracetam (Keppra), carvedilol, and medications for chronic back pain and bowel regimen. Administer IV fluids as needed. Manage diabetes with an insulin sliding scale, finger stick blood glucose checks before meals and at bedtime (AC/HS), and a carbohydrate-controlled diet. Implement delirium precautions, including regulation of the sleep-wake cycle, constant reorientation by staff, maximizing natural light during the day, mobilizing the patient to a chair as tolerated, and avoiding medications that may exacerbate delirium (such as anticholinergics, antihistamines, benzodiazepines, and opioids). Arrange outpatient follow-up for repeat imaging of the pulmonary nodule. PT Rehab A 74-year-old male with a past medical history of Parkinson's dementia, hypertension, CVA, seizure disorder, diabetes mellitus type 2, and kidney stones presents with confusion, multiple falls, and decreased oral intake. His assisted living facility reports multiple falls over the past few days, including two witnessed falls today where he presented with weakness and slumped to the ground without loss of consciousness. While normally alert and oriented x3 and independent with ADLs, he has become increasingly confused over the past few days. He was evaluated in the ED yesterday for the same complaint; his workup was negative, and he was discharged.    Vital Signs (Last 24 Hours):    Temperature: 97.9-98.2°F (36.6-36.8°C).  Heart Rate: 62-78 bpm.  Blood Pressure: 161/96 - 167/91 mmHg.  Respiratory Rate: 18 breaths per minute.  SpO2: 98% on room air.  Head CT: No acute intracranial pathology. No midline shift, mass effect, or intracranial hemorrhage identified. Moderate chronic microvascular changes and multiple chronic lacunar infarcts are noted.    Assessment: Suspected early failure to thrive secondary to advanced Parkinson's dementia, complicated by recurrent falls and decreased oral intake.    Plan:  Diagnostics: Orthostatic vital signs; TSH and T4 levels. Urinalysis unremarkable. Continuous telemetry monitoring for arrhythmic events.      Management: Implement fall precautions. One-to-one feeding assistance with calorie counts. Continue home medications: carbidopa/levodopa (Sinemet), entacapone, rivastigmine, statin, aspirin, levetiracetam (Keppra), carvedilol, and medications for chronic back pain and bowel regimen. Administer IV fluids as needed. Manage diabetes with an insulin sliding scale, finger stick blood glucose checks before meals and at bedtime (AC/HS), and a carbohydrate-controlled diet. Implement delirium precautions, including regulation of the sleep-wake cycle, constant reorientation by staff, maximizing natural light during the day, mobilizing the patient to a chair as tolerated, and avoiding medications that may exacerbate delirium (such as anticholinergics, antihistamines, benzodiazepines, and opioids). Arrange outpatient follow-up for repeat imaging of the pulmonary nodule. PT Rehab    Seen on 01/16

## 2025-01-16 NOTE — H&P ADULT - ASSESSMENT
#Recurrent Falls  #Weakness  #Poor PO intake  - Trauma w/up:  - Infectious workup:  - IV hydration  - f/u PT    #Hx of CVA  - MRI 1/21/23: There is no evidence of acute intracranial pathology. No evidence of acute infarct, intracranial hemorrhage or mass effect.Moderate chronic microvascular type changes as well as chronic lacunar infarcts as detailed.  - Not on statin as per [facility], will resume here  - C/w aspirin     #Hx of seizures  - As per DC summary from neuro in Jan 2023:  In 2020 had an EEG with general slowing and he was started on Keppra.  - C/w Keppra    # HTN  - C/w lisinopril   - C/w carvedilol    # DM II  - Last A1c 5.3 in Sep 2022  - hold home metformin  - Repeat A1c  - check fingersticks start insulin sliding scale if fsg>180    # Parkinson's disease  - c/w entacapone   - c/w cabidopa-levidopa   - RIVASTIGMINE held, not available in our pharmacy, ask patient to bring it tomorrow ???    # Insomnia   - c/w trazodone   - C/w [place] therapeutic interchange  - c/w melatonin     #DVT prophylaxis: Lovenox 40mg   #GI prophylaxis: not needed  #Diet: Dash, diabetic  #Activity: ambulate with assistance     74M PMH of Parkinson's, HTN, hx ischemic CVA x2, seizure, type 2 DM, dementia and chronic back pain, Hx of seizures on keppra BIBA from [facility]  or generalized weakness, confusion, decreased PO intake and multiple witnessed falls over the past few days. He had two witnessed falls the day prior to admission where he was weak and slumped to the ground. No LOC, not on AC. Per staff, he is normally alert and oriented x 3 and able to perform ADLs, but for the past few days has been increasingly confused. He was evaluated in the ED the day prior for similar complaints, had a negative work up and was discharged.  The Patient is A+Ox2-3 on interview, poor historian, cannot recollect the events that occurred, does endorse pain around the left eye.   Denies HA, f/c, CP, SOB, abdominal pain, dysuria, n/v/diarrhea, blood in stool or urine  Tried reaching [facility] for further information with no answer.       Labs: CBC, CMP wnl, trop 11 --> 9  UA negative  CTH (1/15): No acute intracranial pathology. No evidence of midline shift, mass effect or intracranial hemorrhage. Moderate chronic microvascular type changes as well as multiple chronic lacunar infarcts, detailed above.    CXR: No radiographic evidence of acute cardiopulmonary disease.  xray pelvis: Degenerative changes of the lower lumbar spine. Proliferative changes of the pelvis. No acute displaced fracture.      #Recurrent Falls  #Weakness  #Poor PO intake  #Parkinson's   #Dementia   - likely 2/2 Parkinson's and dementia   - Trauma w/up: CTH (1/15) neg, CXR negative, xray pelvis no acute fracture  - Infectious workup: SIRS negative, afebrile, no elevated WBC, CXR wnl, UA negative   - May need repeat CTH if mental status continues to be different from baseline   - f/u PT  - monitor on tele   - f/u TSH, T4, vit D  - f/u orthostatics   - Fall precautions, frequent orientation   - c/w home meds: carbidopa/levodopa, entaapone, rivastigmine patch (submitted nonformulary- f/u if accepted)    #Hx of CVA  - MRI 1/21/23: There is no evidence of acute intracranial pathology. No evidence of acute infarct, intracranial hemorrhage or mass effect.Moderate chronic microvascular type changes as well as chronic lacunar infarcts as detailed.  - Not on statin as per [facility], will resume here  - C/w aspirin     #Hx of seizures  - As per DC summary from neuro in Jan 2023:  In 2020 had an EEG with general slowing and he was started on Keppra.  - C/w Keppra    # HTN  - C/w lisinopril   - C/w carvedilol    # DM II  - Last A1c 5.3 in Sep 2022  - hold home metformin  - Repeat A1c  - check fingersticks start insulin sliding scale if fsg>180    # Parkinson's disease  - c/w entacapone   - c/w cabidopa-levidopa   - RIVASTIGMINE held, not available in our pharmacy, ask patient to bring it tomorrow ???    # Insomnia   - c/w trazodone   - C/w [place] therapeutic interchange  - c/w melatonin     #DVT prophylaxis: Lovenox 40mg   #GI prophylaxis: not needed  #Diet: Dash, diabetic  #Activity: ambulate with assistance     74M PMH of Parkinson's, HTN, hx ischemic CVA x2, type 2 DM, dementia and chronic back pain, Hx of seizures on keppra, BIBA from [place] facility  or generalized weakness, confusion, decreased PO intake and multiple witnessed falls over the past few days. He had two witnessed falls the day prior to admission where he was weak and slumped to the ground. No LOC, not on AC. Per staff, he is normally alert and oriented x 3 and able to perform ADLs, but for the past few days has been increasingly confused. He was evaluated in the ED the day prior for similar complaints, had a negative work up and was discharged.  The Patient is A+Ox2-3 on interview, poor historian, cannot recollect the events that occurred, does endorse pain around the left eye.   Denies HA, f/c, CP, SOB, abdominal pain, dysuria, n/v/diarrhea, blood in stool or urine  Tried reaching [facility] for further information with no answer.       #Recurrent Falls  #Weakness  #Poor PO intake  #Parkinson's   #Dementia   - likely 2/2 Parkinson's and dementia   - Trauma w/up: CTH (1/15) neg, CXR negative, xray pelvis no acute fracture  - Infectious workup: SIRS negative, afebrile, no elevated WBC, CXR wnl, UA negative   - May need repeat CTH if mental status continues to be different from baseline   - f/u PT  - trop 11-->9  - monitor on tele   - f/u TSH, T4, vit D  - f/u orthostatics   - Fall precautions, frequent orientation   - c/w home meds: carbidopa/levodopa, entaapone, rivastigmine patch (submitted nonformulary- f/u if accepted)    #Hx of CVA  - MRI 1/21/23: There is no evidence of acute intracranial pathology. No evidence of acute infarct, intracranial hemorrhage or mass effect. Moderate chronic microvascular type changes as well as chronic lacunar infarcts as detailed.  - Not on statin? unclear why  - C/w aspirin     #Hx of seizures  - C/w Keppra    # HTN  - not currently on any home meds  - Monitor     # DM II  - a1c  -FS, ISS      #DVT prophylaxis: Lovenox 40mg   #GI prophylaxis: not needed  #Diet: cc  #Activity: increase as tolerated

## 2025-01-16 NOTE — ED PROVIDER NOTE - PHYSICAL EXAMINATION
VITAL SIGNS: I have reviewed nursing notes and confirm.  CONSTITUTIONAL: well-appearing, non-toxic, NAD  SKIN: Warm dry, normal skin turgor, no acute rash, no bruising  HEAD: NCAT  EYES: EOMI, PERRLA, no scleral icterus, normal conjunctiva  ENT: Moist mucous membranes, OR clear. Normal pharynx  NECK: Supple; non tender. Full ROM. No cervical LAD  CARD: RRR, no murmurs, rubs or gallops  RESP: clear to ausculation b/l.  No rales, rhonchi, or wheezing. No increased WOB.  ABD: soft, + BS, non-tender, non-distended, no rebound or guarding. No CVA tenderness  EXT: Full ROM, no bony tenderness, pulses intact in bilateral UE and LE, no pedal edema, no calf tenderness  NEURO: normal motor. normal sensory.   PSYCH: Cooperative, appropriate. intermittently confused. AAOx2.

## 2025-01-16 NOTE — H&P ADULT - NSHPPHYSICALEXAM_GEN_ALL_CORE
PHYSICAL EXAM:  GENERAL: NAD, well-groomed, well-developed  HEAD:  Atraumatic, Normocephalic  EYES: EOMI, PERRLA, conjunctiva and sclera clear  ENMT: No tonsillar erythema, exudates, or enlargement; Moist mucous membranes  NECK: Supple, No JVD, Normal thyroid  HEART: Regular rate and rhythm; No murmurs, rubs, or gallops  RESPIRATORY: CTA B/L, No W/R/R  ABDOMEN: Soft, Nontender, Nondistended; Bowel sounds present  NEUROLOGY: A&Ox3, nonfocal, moving all extremities  EXTREMITIES:  2+ Peripheral Pulses, No clubbing, cyanosis, or edema  SKIN: warm, dry, normal color, no rash or abnormal lesions PHYSICAL EXAM:  GENERAL: NAD, well-groomed, well-developed  HEAD:  bruising around left eye, Normocephalic  EYES: EOMI, PERRLA, conjunctiva and sclera clear  HEART: Regular rate and rhythm; No murmurs, rubs, or gallops  RESPIRATORY: CTA B/L, No W/R/R  ABDOMEN: Soft, Nontender, Nondistended; Bowel sounds present  NEUROLOGY: resting tremor, A&Ox2-3, nonfocal, moving all extremities, 5/5 strength in all extremities, CNII-XII intact  EXTREMITIES:  No clubbing, cyanosis, or edema  SKIN: warm, dry, normal color, no rash or abnormal lesions

## 2025-01-16 NOTE — ED ADULT NURSE NOTE - OBJECTIVE STATEMENT
sent in from [facility] for to "r/o stroke", as per triage note pt with AMS x 2 days. pt was seen yesterday in ED for same complaint and had workup done.

## 2025-01-16 NOTE — ED ADULT TRIAGE NOTE - CHIEF COMPLAINT QUOTE
PT BIB[facility] c/o of altered mental status for x2 days. PT was last seen here yesterday for same complaint. Pt still confused per staff. [id number] with EMS. Pt with recent falls as well. H/x of parkinson's and dementia

## 2025-01-16 NOTE — H&P ADULT - HISTORY OF PRESENT ILLNESS
Parkinson's, HTN, CVA x2, seizure, type 2 DM, dementia (mentioned on transfer sheet)  and chronic back pain  Hx of ischemic CVA, DM, Hx of ?seizures on keppra     · HPI Objective Statement: 74-year-old male past medical history of Parkinson's, hypertension, CVA, seizure disorder, diabetes, kidney stones presents for evaluation status post fall.  Patient endorses generalized weakness for the past couple days and when he tried to go to the restroom at 5 AM this morning was very weak and fell backwards hitting the back of his head on the ground requiring help to get up.  Now presents with continued generalized weakness, no inciting relieving factors.  Denies fever, headache, chest pain, shortness of breath, abdominal pain, dysuria, hematuria, vomiting, diarrhea, LOC, AC.      74-year-old male past medical history of Parkinson's, hypertension, CVA, seizure disorder, diabetes, kidney stones who presents for confusion and multiple falls, decreased p.o. intake. Per [facility], patient has had multiple falls over the past few days. Had two witnessed falls today where he was weak and slumped to the ground. No LOC. Per staff, he is normally alert and oriented x 3 and able to perform ADLs, but for the past few days has been increasingly confused. He was seen in the ED yesterday for the same complaints, had a negative work up and was sent back.    BIBA from [facility] facility s/p trip and fall around 5 AM this morning, hit the side of his head, denies A/C use, denies LOC, pt was able to get up after the fall. c/o weakness x2 days  fall    PT BIBA [facility] c/o of altered mental status for x2 days. PT was last seen here yesterday for same complaint. Pt still confused per staff. [id number] with EMS. Pt with recent falls as well. H/x of parkinson's and dementia  altered mental status      · BP Systolic 161 mm Hg  · BP Diastolic 96 mm Hg  · Heart Rate 78 /min  · Respiration Rate (breaths/min) 18 /min  · Temp (F) 98.2 Degrees F  · SpO2 (%) 98 % on RA      74M PMH of Parkinson's, HTN, hx ischemic CVA x2, seizure, type 2 DM, dementia and chronic back pain, Hx of seizures on keppra BIBA from [facility]  or generalized weakness, confusion, decreased PO intake and multiple witnessed falls over the past few days. He had two witnessed falls the day prior to admission where he was weak and slumped to the ground. No LOC, not on AC. Per staff, he is normally alert and oriented x 3 and able to perform ADLs, but for the past few days has been increasingly confused. He was evaluated in the ED the day prior for similar complaints, had a negative work up and was discharged.  The Patient is A+Ox2-3 on interview, poor historian, cannot recollect the events that occurred, does endorse pain around the left eye.   Denies HA, f/c, CP, SOB, abdominal pain, dysuria, n/v/diarrhea, blood in stool or urine  Tried reaching [facility] for further information with no answer.       · BP Systolic 161 mm Hg  · BP Diastolic 96 mm Hg  · Heart Rate 78 /min  · Respiration Rate (breaths/min) 18 /min  · Temp (F) 98.2 Degrees F  · SpO2 (%) 98 % on RA     Labs: CBC, CMP wnl, trop 11 --> 9  UA negative  CTH (1/15): No acute intracranial pathology. No evidence of midline shift, mass effect or intracranial hemorrhage. Moderate chronic microvascular type changes as well as multiple chronic lacunar infarcts, detailed above.    CXR: No radiographic evidence of acute cardiopulmonary disease.  xray pelvis: Degenerative changes of the lower lumbar spine. Proliferative changes of the pelvis. No acute displaced fracture.     74M PMH of Parkinson's, HTN, hx ischemic CVA x2, type 2 DM, dementia and chronic back pain, Hx of seizures on keppra BIBA from [facility]  or generalized weakness, confusion, decreased PO intake and multiple witnessed falls over the past few days. He had two witnessed falls the day prior to admission where he was weak and slumped to the ground. No LOC, not on AC. Per staff, he is normally alert and oriented x 3 and able to perform ADLs, but for the past few days has been increasingly confused. He was evaluated in the ED the day prior for similar complaints, had a negative work up and was discharged.  The Patient is A+Ox2-3 on interview, poor historian, cannot recollect the events that occurred, does endorse pain around the left eye.   Denies HA, f/c, CP, SOB, abdominal pain, dysuria, n/v/diarrhea, blood in stool or urine  Tried reaching [facility] for further information with no answer.       · BP Systolic 161 mm Hg  · BP Diastolic 96 mm Hg  · Heart Rate 78 /min  · Respiration Rate (breaths/min) 18 /min  · Temp (F) 98.2 Degrees F  · SpO2 (%) 98 % on RA     Labs: CBC, CMP wnl, trop 11 --> 9  UA negative  CTH (1/15): No acute intracranial pathology. No evidence of midline shift, mass effect or intracranial hemorrhage. Moderate chronic microvascular type changes as well as multiple chronic lacunar infarcts, detailed above.    CXR: No radiographic evidence of acute cardiopulmonary disease.  xray pelvis: Degenerative changes of the lower lumbar spine. Proliferative changes of the pelvis. No acute displaced fracture.

## 2025-01-17 DIAGNOSIS — Z71.89 OTHER SPECIFIED COUNSELING: ICD-10-CM

## 2025-01-17 DIAGNOSIS — R53.1 WEAKNESS: ICD-10-CM

## 2025-01-17 DIAGNOSIS — Z51.5 ENCOUNTER FOR PALLIATIVE CARE: ICD-10-CM

## 2025-01-17 DIAGNOSIS — G20.A1 PARKINSON'S DISEASE WITHOUT DYSKINESIA, WITHOUT MENTION OF FLUCTUATIONS: ICD-10-CM

## 2025-01-17 DIAGNOSIS — R41.0 DISORIENTATION, UNSPECIFIED: ICD-10-CM

## 2025-01-17 LAB
24R-OH-CALCIDIOL SERPL-MCNC: 31 NG/ML — SIGNIFICANT CHANGE UP (ref 30–80)
ALBUMIN SERPL ELPH-MCNC: 3.9 G/DL — SIGNIFICANT CHANGE UP (ref 3.5–5.2)
ALP SERPL-CCNC: 76 U/L — SIGNIFICANT CHANGE UP (ref 30–115)
ALT FLD-CCNC: 5 U/L — SIGNIFICANT CHANGE UP (ref 0–41)
ANION GAP SERPL CALC-SCNC: 11 MMOL/L — SIGNIFICANT CHANGE UP (ref 7–14)
AST SERPL-CCNC: 18 U/L — SIGNIFICANT CHANGE UP (ref 0–41)
BASOPHILS # BLD AUTO: 0.02 K/UL — SIGNIFICANT CHANGE UP (ref 0–0.2)
BASOPHILS NFR BLD AUTO: 0.3 % — SIGNIFICANT CHANGE UP (ref 0–1)
BILIRUB SERPL-MCNC: 1 MG/DL — SIGNIFICANT CHANGE UP (ref 0.2–1.2)
BUN SERPL-MCNC: 11 MG/DL — SIGNIFICANT CHANGE UP (ref 10–20)
CALCIUM SERPL-MCNC: 8.9 MG/DL — SIGNIFICANT CHANGE UP (ref 8.4–10.5)
CHLORIDE SERPL-SCNC: 105 MMOL/L — SIGNIFICANT CHANGE UP (ref 98–110)
CO2 SERPL-SCNC: 26 MMOL/L — SIGNIFICANT CHANGE UP (ref 17–32)
CREAT SERPL-MCNC: 0.9 MG/DL — SIGNIFICANT CHANGE UP (ref 0.7–1.5)
EGFR: 91 ML/MIN/1.73M2 — SIGNIFICANT CHANGE UP
EOSINOPHIL # BLD AUTO: 0.1 K/UL — SIGNIFICANT CHANGE UP (ref 0–0.7)
EOSINOPHIL NFR BLD AUTO: 1.3 % — SIGNIFICANT CHANGE UP (ref 0–8)
GLUCOSE BLDC GLUCOMTR-MCNC: 112 MG/DL — HIGH (ref 70–99)
GLUCOSE BLDC GLUCOMTR-MCNC: 73 MG/DL — SIGNIFICANT CHANGE UP (ref 70–99)
GLUCOSE BLDC GLUCOMTR-MCNC: 76 MG/DL — SIGNIFICANT CHANGE UP (ref 70–99)
GLUCOSE BLDC GLUCOMTR-MCNC: 84 MG/DL — SIGNIFICANT CHANGE UP (ref 70–99)
GLUCOSE SERPL-MCNC: 143 MG/DL — HIGH (ref 70–99)
HCT VFR BLD CALC: 44.8 % — SIGNIFICANT CHANGE UP (ref 42–52)
HGB BLD-MCNC: 15.3 G/DL — SIGNIFICANT CHANGE UP (ref 14–18)
IMM GRANULOCYTES NFR BLD AUTO: 0.3 % — SIGNIFICANT CHANGE UP (ref 0.1–0.3)
LYMPHOCYTES # BLD AUTO: 1.95 K/UL — SIGNIFICANT CHANGE UP (ref 1.2–3.4)
LYMPHOCYTES # BLD AUTO: 24.8 % — SIGNIFICANT CHANGE UP (ref 20.5–51.1)
MAGNESIUM SERPL-MCNC: 1.8 MG/DL — SIGNIFICANT CHANGE UP (ref 1.8–2.4)
MCHC RBC-ENTMCNC: 33.8 PG — HIGH (ref 27–31)
MCHC RBC-ENTMCNC: 34.2 G/DL — SIGNIFICANT CHANGE UP (ref 32–37)
MCV RBC AUTO: 98.9 FL — HIGH (ref 80–94)
MONOCYTES # BLD AUTO: 0.67 K/UL — HIGH (ref 0.1–0.6)
MONOCYTES NFR BLD AUTO: 8.5 % — SIGNIFICANT CHANGE UP (ref 1.7–9.3)
NEUTROPHILS # BLD AUTO: 5.11 K/UL — SIGNIFICANT CHANGE UP (ref 1.4–6.5)
NEUTROPHILS NFR BLD AUTO: 64.8 % — SIGNIFICANT CHANGE UP (ref 42.2–75.2)
NRBC # BLD: 0 /100 WBCS — SIGNIFICANT CHANGE UP (ref 0–0)
NRBC BLD-RTO: 0 /100 WBCS — SIGNIFICANT CHANGE UP (ref 0–0)
PLATELET # BLD AUTO: 125 K/UL — LOW (ref 130–400)
PMV BLD: 10.1 FL — SIGNIFICANT CHANGE UP (ref 7.4–10.4)
POTASSIUM SERPL-MCNC: 4.3 MMOL/L — SIGNIFICANT CHANGE UP (ref 3.5–5)
POTASSIUM SERPL-SCNC: 4.3 MMOL/L — SIGNIFICANT CHANGE UP (ref 3.5–5)
PROT SERPL-MCNC: 6.1 G/DL — SIGNIFICANT CHANGE UP (ref 6–8)
RBC # BLD: 4.53 M/UL — LOW (ref 4.7–6.1)
RBC # FLD: 11.6 % — SIGNIFICANT CHANGE UP (ref 11.5–14.5)
SODIUM SERPL-SCNC: 142 MMOL/L — SIGNIFICANT CHANGE UP (ref 135–146)
T4 AB SER-ACNC: 6.3 UG/DL — SIGNIFICANT CHANGE UP (ref 4.6–12)
TSH SERPL-MCNC: 2.81 UIU/ML — SIGNIFICANT CHANGE UP (ref 0.27–4.2)
WBC # BLD: 7.87 K/UL — SIGNIFICANT CHANGE UP (ref 4.8–10.8)
WBC # FLD AUTO: 7.87 K/UL — SIGNIFICANT CHANGE UP (ref 4.8–10.8)

## 2025-01-17 PROCEDURE — 99222 1ST HOSP IP/OBS MODERATE 55: CPT

## 2025-01-17 PROCEDURE — 99497 ADVNCD CARE PLAN 30 MIN: CPT | Mod: 25

## 2025-01-17 PROCEDURE — 99232 SBSQ HOSP IP/OBS MODERATE 35: CPT

## 2025-01-17 RX ORDER — DM/PSEUDOEPHED/ACETAMINOPHEN 10-30-250
25 CAPSULE ORAL ONCE
Refills: 0 | Status: DISCONTINUED | OUTPATIENT
Start: 2025-01-17 | End: 2025-01-24

## 2025-01-17 RX ORDER — ACETAMINOPHEN, DIPHENHYDRAMINE HCL, PHENYLEPHRINE HCL 325; 25; 5 MG/1; MG/1; MG/1
3 TABLET ORAL AT BEDTIME
Refills: 0 | Status: DISCONTINUED | OUTPATIENT
Start: 2025-01-17 | End: 2025-01-24

## 2025-01-17 RX ORDER — ACETAMINOPHEN 160 MG/5ML
650 SUSPENSION ORAL EVERY 6 HOURS
Refills: 0 | Status: DISCONTINUED | OUTPATIENT
Start: 2025-01-17 | End: 2025-01-24

## 2025-01-17 RX ORDER — SODIUM CHLORIDE 9 G/ML
1000 INJECTION, SOLUTION INTRAVENOUS
Refills: 0 | Status: DISCONTINUED | OUTPATIENT
Start: 2025-01-17 | End: 2025-01-24

## 2025-01-17 RX ORDER — SENNOSIDES 8.6 MG
2 TABLET ORAL AT BEDTIME
Refills: 0 | Status: DISCONTINUED | OUTPATIENT
Start: 2025-01-17 | End: 2025-01-24

## 2025-01-17 RX ORDER — GABAPENTIN 800 MG/1
100 TABLET ORAL AT BEDTIME
Refills: 0 | Status: DISCONTINUED | OUTPATIENT
Start: 2025-01-17 | End: 2025-01-24

## 2025-01-17 RX ORDER — INSULIN LISPRO 100/ML
VIAL (ML) SUBCUTANEOUS
Refills: 0 | Status: DISCONTINUED | OUTPATIENT
Start: 2025-01-17 | End: 2025-01-20

## 2025-01-17 RX ORDER — CARBIDOPA/LEVODOPA 10MG-100MG
2 TABLET ORAL THREE TIMES A DAY
Refills: 0 | Status: DISCONTINUED | OUTPATIENT
Start: 2025-01-17 | End: 2025-01-24

## 2025-01-17 RX ORDER — GLUCAGON 3 MG/1
1 POWDER NASAL ONCE
Refills: 0 | Status: DISCONTINUED | OUTPATIENT
Start: 2025-01-17 | End: 2025-01-24

## 2025-01-17 RX ORDER — ENOXAPARIN SODIUM 100 MG/ML
40 INJECTION SUBCUTANEOUS EVERY 24 HOURS
Refills: 0 | Status: DISCONTINUED | OUTPATIENT
Start: 2025-01-17 | End: 2025-01-24

## 2025-01-17 RX ORDER — CARVEDILOL 6.25 MG
6.25 TABLET ORAL EVERY 12 HOURS
Refills: 0 | Status: DISCONTINUED | OUTPATIENT
Start: 2025-01-17 | End: 2025-01-20

## 2025-01-17 RX ORDER — RIVASTIGMINE 4.6 MG/24H
1 PATCH, EXTENDED RELEASE TRANSDERMAL EVERY 24 HOURS
Refills: 0 | Status: DISCONTINUED | OUTPATIENT
Start: 2025-01-17 | End: 2025-01-24

## 2025-01-17 RX ORDER — MIRTAZAPINE 30 MG/1
7.5 TABLET, FILM COATED ORAL DAILY
Refills: 0 | Status: DISCONTINUED | OUTPATIENT
Start: 2025-01-17 | End: 2025-01-24

## 2025-01-17 RX ORDER — LACTULOSE 10 G/15 ML
20 SOLUTION, ORAL ORAL ONCE
Refills: 0 | Status: COMPLETED | OUTPATIENT
Start: 2025-01-17 | End: 2025-01-17

## 2025-01-17 RX ORDER — POLYETHYLENE GLYCOL 3350 17 G/17G
17 POWDER, FOR SOLUTION ORAL DAILY
Refills: 0 | Status: DISCONTINUED | OUTPATIENT
Start: 2025-01-17 | End: 2025-01-24

## 2025-01-17 RX ORDER — DM/PSEUDOEPHED/ACETAMINOPHEN 10-30-250
12.5 CAPSULE ORAL ONCE
Refills: 0 | Status: DISCONTINUED | OUTPATIENT
Start: 2025-01-17 | End: 2025-01-24

## 2025-01-17 RX ORDER — LEVETIRACETAM 750 MG/1
500 TABLET, FILM COATED ORAL
Refills: 0 | Status: DISCONTINUED | OUTPATIENT
Start: 2025-01-17 | End: 2025-01-24

## 2025-01-17 RX ORDER — TRAZODONE HCL 100 MG
100 TABLET ORAL AT BEDTIME
Refills: 0 | Status: DISCONTINUED | OUTPATIENT
Start: 2025-01-17 | End: 2025-01-24

## 2025-01-17 RX ORDER — CARBIDOPA/LEVODOPA 10MG-100MG
1 TABLET ORAL
Refills: 0 | Status: DISCONTINUED | OUTPATIENT
Start: 2025-01-17 | End: 2025-01-24

## 2025-01-17 RX ORDER — DM/PSEUDOEPHED/ACETAMINOPHEN 10-30-250
15 CAPSULE ORAL ONCE
Refills: 0 | Status: DISCONTINUED | OUTPATIENT
Start: 2025-01-17 | End: 2025-01-24

## 2025-01-17 RX ORDER — ENTACAPONE 200 MG/1
200 TABLET, FILM COATED ORAL THREE TIMES A DAY
Refills: 0 | Status: DISCONTINUED | OUTPATIENT
Start: 2025-01-17 | End: 2025-01-24

## 2025-01-17 RX ORDER — ASPIRIN 81 MG/1
81 TABLET, COATED ORAL DAILY
Refills: 0 | Status: DISCONTINUED | OUTPATIENT
Start: 2025-01-17 | End: 2025-01-24

## 2025-01-17 RX ADMIN — Medication 2 TABLET(S): at 22:20

## 2025-01-17 RX ADMIN — Medication 6.25 MILLIGRAM(S): at 05:14

## 2025-01-17 RX ADMIN — LEVETIRACETAM 500 MILLIGRAM(S): 750 TABLET, FILM COATED ORAL at 05:14

## 2025-01-17 RX ADMIN — MIRTAZAPINE 7.5 MILLIGRAM(S): 30 TABLET, FILM COATED ORAL at 13:02

## 2025-01-17 RX ADMIN — Medication 20 GRAM(S): at 17:38

## 2025-01-17 RX ADMIN — ENTACAPONE 200 MILLIGRAM(S): 200 TABLET, FILM COATED ORAL at 13:04

## 2025-01-17 RX ADMIN — ENOXAPARIN SODIUM 40 MILLIGRAM(S): 100 INJECTION SUBCUTANEOUS at 05:14

## 2025-01-17 RX ADMIN — Medication 100 MILLIGRAM(S): at 22:21

## 2025-01-17 RX ADMIN — GABAPENTIN 100 MILLIGRAM(S): 800 TABLET ORAL at 22:21

## 2025-01-17 RX ADMIN — LEVETIRACETAM 500 MILLIGRAM(S): 750 TABLET, FILM COATED ORAL at 18:50

## 2025-01-17 RX ADMIN — Medication 1 TABLET(S): at 19:33

## 2025-01-17 RX ADMIN — ASPIRIN 81 MILLIGRAM(S): 81 TABLET, COATED ORAL at 13:03

## 2025-01-17 RX ADMIN — Medication 6.25 MILLIGRAM(S): at 18:50

## 2025-01-17 RX ADMIN — Medication 2 TABLET(S): at 05:14

## 2025-01-17 RX ADMIN — ENTACAPONE 200 MILLIGRAM(S): 200 TABLET, FILM COATED ORAL at 22:32

## 2025-01-17 RX ADMIN — Medication 2 TABLET(S): at 13:04

## 2025-01-17 RX ADMIN — RIVASTIGMINE 1 PATCH: 4.6 PATCH, EXTENDED RELEASE TRANSDERMAL at 06:16

## 2025-01-17 RX ADMIN — ENTACAPONE 200 MILLIGRAM(S): 200 TABLET, FILM COATED ORAL at 06:16

## 2025-01-17 NOTE — PROGRESS NOTE ADULT - SUBJECTIVE AND OBJECTIVE BOX
ZAKIA MILLS  74y  [facility]      Patient is a 74y old  Male who presents with a chief complaint of Recurrent witnessed falls, AMS (16 Jan 2025 21:57)      INTERVAL HPI/OVERNIGHT EVENTS:        REVIEW OF SYSTEMS:        FAMILY HISTORY:  FH: hypertension    FH: CAD (coronary artery disease)      T(C): 37.2 (01-17-25 @ 07:34), Max: 37.2 (01-17-25 @ 07:34)  HR: 63 (01-17-25 @ 07:34) (56 - 78)  BP: 175/99 (01-17-25 @ 07:34) (157/85 - 189/90)  RR: 18 (01-17-25 @ 07:34) (18 - 19)  SpO2: 96% (01-17-25 @ 07:34) (96% - 98%)  Wt(kg): --Vital Signs Last 24 Hrs  T(C): 37.2 (17 Jan 2025 07:34), Max: 37.2 (17 Jan 2025 07:34)  T(F): 98.9 (17 Jan 2025 07:34), Max: 98.9 (17 Jan 2025 07:34)  HR: 63 (17 Jan 2025 07:34) (56 - 78)  BP: 175/99 (17 Jan 2025 07:34) (157/85 - 189/90)  BP(mean): --  RR: 18 (17 Jan 2025 07:34) (18 - 19)  SpO2: 96% (17 Jan 2025 07:34) (96% - 98%)    Parameters below as of 17 Jan 2025 07:34  Patient On (Oxygen Delivery Method): room air        PHYSICAL EXAM:  GENERAL: NAD, well-groomed, well-developed  HEAD:  Atraumatic, Normocephalic  EYES: EOMI, PERRLA, conjunctiva and sclera clear  ENMT: No tonsillar erythema, exudates, or enlargement; Moist mucous membranes, Good dentition, No lesions  NECK: Supple, No JVD, Normal thyroid  NERVOUS SYSTEM:  Alert & Oriented X3, Good concentration; Motor Strength 5/5 B/L upper and lower extremities; DTRs 2+ intact and symmetric  PULM: Clear to auscultation bilaterally  CARDIAC: Regular rate and rhythm; No murmurs, rubs, or gallops  GI: Soft, Nontender, Nondistended; Bowel sounds present  EXTREMITIES:  2+ Peripheral Pulses, No clubbing, cyanosis, or edema  LYMPH: No lymphadenopathy noted  SKIN: No rashes or lesions    Consultant(s) Notes Reviewed:  [x ] YES  [ ] NO  Care Discussed with Consultants/Other Providers [ x] YES  [ ] NO    LABS:                            15.5   8.61  )-----------( 136      ( 16 Jan 2025 12:40 )             44.8   01-16    142  |  106  |  15  ----------------------------<  141[H]  4.3   |  26  |  1.1    Ca    8.7      16 Jan 2025 12:40  Mg     1.8     01-15    TPro  5.8[L]  /  Alb  3.7  /  TBili  1.1  /  DBili  x   /  AST  21  /  ALT  <5  /  AlkPhos  73  01-16            Urinalysis with Rflx Culture (collected 16 Jan 2025 15:23)      acetaminophen     Tablet .. 650 milliGRAM(s) Oral every 6 hours PRN  aspirin  chewable 81 milliGRAM(s) Oral daily  carbidopa/levodopa  25/100 2 Tablet(s) Oral three times a day  carbidopa/levodopa CR 50/200 1 Tablet(s) Oral <User Schedule>  carvedilol 6.25 milliGRAM(s) Oral every 12 hours  dextrose 5%. 1000 milliLiter(s) IV Continuous <Continuous>  dextrose 5%. 1000 milliLiter(s) IV Continuous <Continuous>  dextrose 50% Injectable 25 Gram(s) IV Push once  dextrose 50% Injectable 12.5 Gram(s) IV Push once  dextrose 50% Injectable 25 Gram(s) IV Push once  dextrose Oral Gel 15 Gram(s) Oral once PRN  enoxaparin Injectable 40 milliGRAM(s) SubCutaneous every 24 hours  entacapone 200 milliGRAM(s) Oral three times a day  gabapentin 100 milliGRAM(s) Oral at bedtime  glucagon  Injectable 1 milliGRAM(s) IntraMuscular once  insulin lispro (ADMELOG) corrective regimen sliding scale   SubCutaneous three times a day before meals  levETIRAcetam 500 milliGRAM(s) Oral two times a day  melatonin 3 milliGRAM(s) Oral at bedtime PRN  mirtazapine 7.5 milliGRAM(s) Oral daily  polyethylene glycol 3350 17 Gram(s) Oral daily  rivastigmine patch  9.5 mG/24 Hr(s) 1 Patch Transdermal every 24 hours  senna 2 Tablet(s) Oral at bedtime  traZODone 100 milliGRAM(s) Oral at bedtime      This is a 74 year old Male, PMH of Parkinson's, HTN, hx ischemic CVA x2, type 2 DM, dementia and chronic back pain, Hx of seizures on keppra, BIBA from [facility]  or generalized weakness, confusion, decreased PO intake and multiple witnessed falls over the past few days. He had two witnessed falls the day prior to admission where he was weak and slumped to the ground. No LOC, not on AC. Per staff, he is normally alert and oriented x 3 and able to perform ADLs, but for the past few days has been increasingly confused. He was evaluated in the ED the day prior for similar complaints, had a negative work up and was discharged.  The Patient is A+Ox2-3 on interview, poor historian, cannot recollect the events that occurred, does endorse pain around the left eye.   Denies HA, f/c, CP, SOB, abdominal pain, dysuria, n/v/diarrhea, blood in stool or urine  Tried reaching [facility] for further information with no answer.       1. Generalized weakness associated with Recurrent Falls in a pt with parkinson disease and dementia   - Admit to medicine         -TSH:pending          - VIt D:pending   - Trauma w/up: CTH (1/15) neg, CXR negative, xray pelvis no acute fracture  - Infectious workup: SIRS negative, afebrile, no elevated WBC, CXR wnl, UA negative \  - trop 11-->9  - Orthostatic bp   - Fall precautions, frequent orientation   - c/w home meds: carbidopa/levodopa, entaapone, rivastigmine patch (submitted nonformulary- f/u if accepted)  - Palliative care consult:pending     2. Hx of CVA  - MRI 1/21/23: There is no evidence of acute intracranial pathology. No evidence of acute infarct, intracranial hemorrhage or mass effect. Moderate chronic microvascular type changes as well as chronic lacunar infarcts as detailed.  - Not on statin? unclear why  - C/w aspirin     3. Hx of seizures  - C/w Keppra    4.  HTN  - not currently on any home meds  - Monitor     5.  DM II  - a1c  -FS, ISS      #DVT prophylaxis: Lovenox 40mg   #GI prophylaxis: not needed  #Diet: cc  #Activity: increase as tolerated   ZAKIA MILLS  74y  [facility]      Patient is a 74y old  Male who presents with a chief complaint of Recurrent witnessed falls, AMS (16 Jan 2025 21:57)      INTERVAL HPI/OVERNIGHT EVENTS:    Patient is interactive and alert and oriented  no overnight events.   denies any complains       FAMILY HISTORY:  FH: hypertension    FH: CAD (coronary artery disease)      T(C): 37.2 (01-17-25 @ 07:34), Max: 37.2 (01-17-25 @ 07:34)  HR: 63 (01-17-25 @ 07:34) (56 - 78)  BP: 175/99 (01-17-25 @ 07:34) (157/85 - 189/90)  RR: 18 (01-17-25 @ 07:34) (18 - 19)  SpO2: 96% (01-17-25 @ 07:34) (96% - 98%)  Wt(kg): --Vital Signs Last 24 Hrs  T(C): 37.2 (17 Jan 2025 07:34), Max: 37.2 (17 Jan 2025 07:34)  T(F): 98.9 (17 Jan 2025 07:34), Max: 98.9 (17 Jan 2025 07:34)  HR: 63 (17 Jan 2025 07:34) (56 - 78)  BP: 175/99 (17 Jan 2025 07:34) (157/85 - 189/90)  BP(mean): --  RR: 18 (17 Jan 2025 07:34) (18 - 19)  SpO2: 96% (17 Jan 2025 07:34) (96% - 98%)    Parameters below as of 17 Jan 2025 07:34  Patient On (Oxygen Delivery Method): room air        PHYSICAL EXAM:  GENERAL: Looks comfortable   NERVOUS SYSTEM:  Alert & Oriented X3   PULM: Clear to auscultation bilaterally  CARDIAC: Regular rate and rhythm;  GI: Soft, Nontender, Nondistended; Bowel sounds present  EXTREMITIES:  2+ Peripheral Pulses,   Cogwheel rigidity     Consultant(s) Notes Reviewed:  [x ] YES  [ ] NO  Care Discussed with Consultants/Other Providers [ x] YES  [ ] NO    LABS:                            15.5   8.61  )-----------( 136      ( 16 Jan 2025 12:40 )             44.8   01-16    142  |  106  |  15  ----------------------------<  141[H]  4.3   |  26  |  1.1    Ca    8.7      16 Jan 2025 12:40  Mg     1.8     01-15    TPro  5.8[L]  /  Alb  3.7  /  TBili  1.1  /  DBili  x   /  AST  21  /  ALT  <5  /  AlkPhos  73  01-16            Urinalysis with Rflx Culture (collected 16 Jan 2025 15:23)      acetaminophen     Tablet .. 650 milliGRAM(s) Oral every 6 hours PRN  aspirin  chewable 81 milliGRAM(s) Oral daily  carbidopa/levodopa  25/100 2 Tablet(s) Oral three times a day  carbidopa/levodopa CR 50/200 1 Tablet(s) Oral <User Schedule>  carvedilol 6.25 milliGRAM(s) Oral every 12 hours  dextrose 5%. 1000 milliLiter(s) IV Continuous <Continuous>  dextrose 5%. 1000 milliLiter(s) IV Continuous <Continuous>  dextrose 50% Injectable 25 Gram(s) IV Push once  dextrose 50% Injectable 12.5 Gram(s) IV Push once  dextrose 50% Injectable 25 Gram(s) IV Push once  dextrose Oral Gel 15 Gram(s) Oral once PRN  enoxaparin Injectable 40 milliGRAM(s) SubCutaneous every 24 hours  entacapone 200 milliGRAM(s) Oral three times a day  gabapentin 100 milliGRAM(s) Oral at bedtime  glucagon  Injectable 1 milliGRAM(s) IntraMuscular once  insulin lispro (ADMELOG) corrective regimen sliding scale   SubCutaneous three times a day before meals  levETIRAcetam 500 milliGRAM(s) Oral two times a day  melatonin 3 milliGRAM(s) Oral at bedtime PRN  mirtazapine 7.5 milliGRAM(s) Oral daily  polyethylene glycol 3350 17 Gram(s) Oral daily  rivastigmine patch  9.5 mG/24 Hr(s) 1 Patch Transdermal every 24 hours  senna 2 Tablet(s) Oral at bedtime  traZODone 100 milliGRAM(s) Oral at bedtime      This is a 74 year old Male, PMH of Parkinson's, HTN, hx ischemic CVA x2, type 2 DM, dementia and chronic back pain, Hx of seizures on keppra, BIBA from [facility]  or generalized weakness, confusion, decreased PO intake and multiple witnessed falls over the past few days. He had two witnessed falls the day prior to admission where he was weak and slumped to the ground. No LOC, not on AC. Per staff, he is normally alert and oriented x 3 and able to perform ADLs, but for the past few days has been increasingly confused. He was evaluated in the ED the day prior for similar complaints, had a negative work up and was discharged.  The Patient is A+Ox2-3 on interview, poor historian, cannot recollect the events that occurred, does endorse pain around the left eye.   Denies HA, f/c, CP, SOB, abdominal pain, dysuria, n/v/diarrhea, blood in stool or urine  Tried reaching [facility] for further information with no answer.       1. Generalized weakness associated with Recurrent Falls in a pt with parkinson disease and dementia   - Admit to medicine         -TSH:pending          - VIt D:pending   - Trauma w/up: CTH (1/15) neg, CXR negative, xray pelvis no acute fracture  - Infectious workup: SIRS negative, afebrile, no elevated WBC, CXR wnl, UA negative \  - trop 11-->9  - Orthostatic bp   - Fall precautions, frequent orientation   - c/w home meds: carbidopa/levodopa, entacapone, rivastigmine patch (submitted nonformulary- f/u if accepted)  - Palliative care consult appreciated   - PT eval:pending   - Physiatry consult:pending     2. Hx of CVA  - MRI 1/21/23: There is no evidence of acute intracranial pathology. No evidence of acute infarct, intracranial hemorrhage or mass effect. Moderate chronic microvascular type changes as well as chronic lacunar infarcts as detailed.  - Not on statin? unclear why  - C/w aspirin     3. Hx of seizures  - C/w Keppra    4.  HTN  - not currently on any home meds  - Monitor     5.  DM II  - Hgba1c:pending   -FS, ISS    6. Hx of constipation   - Cont laxative       #DVT prophylaxis: Lovenox 40mg   #GI prophylaxis: not needed  #Diet: cc  #Activity: increase as tolerated    Updated his care giver about the plan . She is interested in referring him to Parkinson rehab in [place] as part of [facility]. I put a physiatry consult    ZAKIA MILLS  74y  [facility]      Patient is a 74y old  Male who presents with a chief complaint of Recurrent witnessed falls, AMS (16 Jan 2025 21:57)      INTERVAL HPI/OVERNIGHT EVENTS:    Patient is interactive and alert and oriented  no overnight events.   denies any complains       FAMILY HISTORY:  FH: hypertension    FH: CAD (coronary artery disease)      T(C): 37.2 (01-17-25 @ 07:34), Max: 37.2 (01-17-25 @ 07:34)  HR: 63 (01-17-25 @ 07:34) (56 - 78)  BP: 175/99 (01-17-25 @ 07:34) (157/85 - 189/90)  RR: 18 (01-17-25 @ 07:34) (18 - 19)  SpO2: 96% (01-17-25 @ 07:34) (96% - 98%)  Wt(kg): --Vital Signs Last 24 Hrs  T(C): 37.2 (17 Jan 2025 07:34), Max: 37.2 (17 Jan 2025 07:34)  T(F): 98.9 (17 Jan 2025 07:34), Max: 98.9 (17 Jan 2025 07:34)  HR: 63 (17 Jan 2025 07:34) (56 - 78)  BP: 175/99 (17 Jan 2025 07:34) (157/85 - 189/90)  BP(mean): --  RR: 18 (17 Jan 2025 07:34) (18 - 19)  SpO2: 96% (17 Jan 2025 07:34) (96% - 98%)    Parameters below as of 17 Jan 2025 07:34  Patient On (Oxygen Delivery Method): room air        PHYSICAL EXAM:  GENERAL: Looks comfortable   NERVOUS SYSTEM:  Alert & Oriented X3   PULM: Clear to auscultation bilaterally  CARDIAC: Regular rate and rhythm;  GI: Soft, Nontender, Nondistended; Bowel sounds present  EXTREMITIES:  2+ Peripheral Pulses,   Cogwheel rigidity     Consultant(s) Notes Reviewed:  [x ] YES  [ ] NO  Care Discussed with Consultants/Other Providers [ x] YES  [ ] NO    LABS:                            15.5   8.61  )-----------( 136      ( 16 Jan 2025 12:40 )             44.8   01-16    142  |  106  |  15  ----------------------------<  141[H]  4.3   |  26  |  1.1    Ca    8.7      16 Jan 2025 12:40  Mg     1.8     01-15    TPro  5.8[L]  /  Alb  3.7  /  TBili  1.1  /  DBili  x   /  AST  21  /  ALT  <5  /  AlkPhos  73  01-16            Urinalysis with Rflx Culture (collected 16 Jan 2025 15:23)      acetaminophen     Tablet .. 650 milliGRAM(s) Oral every 6 hours PRN  aspirin  chewable 81 milliGRAM(s) Oral daily  carbidopa/levodopa  25/100 2 Tablet(s) Oral three times a day  carbidopa/levodopa CR 50/200 1 Tablet(s) Oral <User Schedule>  carvedilol 6.25 milliGRAM(s) Oral every 12 hours  dextrose 5%. 1000 milliLiter(s) IV Continuous <Continuous>  dextrose 5%. 1000 milliLiter(s) IV Continuous <Continuous>  dextrose 50% Injectable 25 Gram(s) IV Push once  dextrose 50% Injectable 12.5 Gram(s) IV Push once  dextrose 50% Injectable 25 Gram(s) IV Push once  dextrose Oral Gel 15 Gram(s) Oral once PRN  enoxaparin Injectable 40 milliGRAM(s) SubCutaneous every 24 hours  entacapone 200 milliGRAM(s) Oral three times a day  gabapentin 100 milliGRAM(s) Oral at bedtime  glucagon  Injectable 1 milliGRAM(s) IntraMuscular once  insulin lispro (ADMELOG) corrective regimen sliding scale   SubCutaneous three times a day before meals  levETIRAcetam 500 milliGRAM(s) Oral two times a day  melatonin 3 milliGRAM(s) Oral at bedtime PRN  mirtazapine 7.5 milliGRAM(s) Oral daily  polyethylene glycol 3350 17 Gram(s) Oral daily  rivastigmine patch  9.5 mG/24 Hr(s) 1 Patch Transdermal every 24 hours  senna 2 Tablet(s) Oral at bedtime  traZODone 100 milliGRAM(s) Oral at bedtime      This is a 74 year old Male, PMH of Parkinson's, HTN, hx ischemic CVA x2, type 2 DM, dementia and chronic back pain, Hx of seizures on keppra, BIBA from [facility]  or generalized weakness, confusion, decreased PO intake and multiple witnessed falls over the past few days. He had two witnessed falls the day prior to admission where he was weak and slumped to the ground. No LOC, not on AC. Per staff, he is normally alert and oriented x 3 and able to perform ADLs, but for the past few days has been increasingly confused. He was evaluated in the ED the day prior for similar complaints, had a negative work up and was discharged.  The Patient is A+Ox2-3 on interview, poor historian, cannot recollect the events that occurred, does endorse pain around the left eye.   Denies HA, f/c, CP, SOB, abdominal pain, dysuria, n/v/diarrhea, blood in stool or urine  Tried reaching [facility] for further information with no answer.       1. Generalized weakness associated with Recurrent Falls in a pt with parkinson disease and dementia   - Admit to medicine         -TSH:pending          - VIt D:pending   - Trauma w/up: CTH (1/15) neg, CXR negative, xray pelvis no acute fracture  - Infectious workup: SIRS negative, afebrile, no elevated WBC, CXR wnl, UA negative \  - trop 11-->9  - Orthostatic bp   - Fall precautions, frequent orientation   - c/w home meds: carbidopa/levodopa, entacapone, rivastigmine patch (submitted nonformulary- f/u if accepted)  - Palliative care consult appreciated   - PT eval:pending   - Physiatry consult:pending     2. Hx of CVA  - MRI 1/21/23: There is no evidence of acute intracranial pathology. No evidence of acute infarct, intracranial hemorrhage or mass effect. Moderate chronic microvascular type changes as well as chronic lacunar infarcts as detailed.  - Not on statin? unclear why  - C/w aspirin     3. Hx of seizures  - C/w Keppra    4.  HTN  - not currently on any home meds  - Monitor     5.  DM II  - Hgba1c:pending   -FS, ISS    6. Hx of constipation   - Cont laxative       #DVT prophylaxis: Lovenox 40mg   #GI prophylaxis: not needed  #Diet: cc  #Activity: increase as tolerated    Updated his care giver about the plan . She is interested in referring him to Parkinson rehab in [place] as part of [facility]. I put a physiatry consult   His care giver is Shanon Aguila  (wanna know about disposition)

## 2025-01-17 NOTE — PATIENT PROFILE ADULT - NSPROPOAPRESSUREINJURY_GEN_A_NUR
Joe Martinez MD.  
no

## 2025-01-17 NOTE — CONSULT NOTE ADULT - CONVERSATION DETAILS
Spoke with patient at bedside with manager Shanon on the phone. Palliative care introduced.  Shanon was able to provide a brief medical history and hospital course. Spoke with the patient without Shanon on the line and discussed code status.  He noted that he would want CPR/intubation, and he would want Shanon to make decisions after that point. Called and spoke with Shanon and she noted that the patient has been DNR multiple times in the past, but has poor short term memory. She wants to continue Full Code as the patient decided today, but will plan to speak with him again. All questions answered.

## 2025-01-17 NOTE — OCCUPATIONAL THERAPY INITIAL EVALUATION ADULT - SPECIFY REASON(S)
Pt approached for OT IE. Current /101. MEDARDO Lambert notified. OT will hold and follow up as appropriate.

## 2025-01-17 NOTE — PROGRESS NOTE ADULT - ASSESSMENT
74M PMH of Parkinson's, HTN, hx ischemic CVA x2, type 2 DM, dementia and chronic back pain, Hx of seizures on keppra, BIBA from [place] facility  or generalized weakness, confusion, decreased PO intake and multiple witnessed falls over the past few days.    #Recurrent Falls  #Weakness  #Poor PO intake  #Parkinson's   #Dementia   - likely 2/2 Parkinson's and dementia   - Trauma w/up: CTH (1/15) neg, CXR negative, xray pelvis no acute fracture  - Infectious workup: SIRS negative, afebrile, no elevated WBC, CXR wnl, UA negative   - May need repeat CTH if mental status continues to be different from baseline   - f/u PT  - trop 11-->9  - monitor on tele   - f/u TSH, T4, vit D  - f/u orthostatics   - Fall precautions, frequent orientation   - c/w home meds: carbidopa/levodopa, entaapone, rivastigmine patch (submitted nonformulary- f/u if accepted)  - F/u PT/OT  - F/u Palliative    #Hx of CVA  - MRI 1/21/23: There is no evidence of acute intracranial pathology. No evidence of acute infarct, intracranial hemorrhage or mass effect. Moderate chronic microvascular type changes as well as chronic lacunar infarcts as detailed.  - Not on statin? unclear why  - C/w aspirin     #Hx of seizures  - C/w Keppra    # HTN  - not currently on any home meds  - Monitor     # DM II  - a1c  -FS, ISS      *** Updated Shanon ( manager; emergency contact)***    #DVT prophylaxis: Lovenox 40mg   #GI prophylaxis: not needed  #Diet: cc  #Activity: increase as tolerated

## 2025-01-17 NOTE — CONSULT NOTE ADULT - ASSESSMENT
74M PMH of Parkinson's, HTN, hx ischemic CVA x2, type 2 DM, dementia and chronic back pain, Hx of seizures on keppra presents from facility with weakness, falls, and confusion likely in the setting of his underlying Parkinson's/dementia. Palliative care consulted for [facility].    Spoke with patient at bedside with manager Shanon on the phone. Palliative care introduced.  Shanon was able to provide a brief medical history and hospital course. Spoke with the patient without Shanon on the line and discussed code status.  He noted that he would want CPR/intubation, and he would want Shanon to make decisions after that point. Called and spoke with Shanon and she noted that the patient has been DNR multiple times in the past, but has poor short term memory. She wants to continue Full Code as the patient decided today, but will plan to speak with him again. All questions answered.    Recommendations  -Full code  -ongoing medical management  -HCP is friend Shanon  -continued workup of weaknes/falls/confusion per primary team  -will address code status again with patient and Shanon next week  -will follow    MEDD (morphine equivalent daily dose):    Education about palliative care provided to patient/family.  See Recs below.    Please call [phone] with questions or concerns 24/7.

## 2025-01-17 NOTE — ED ADULT NURSE REASSESSMENT NOTE - NS ED NURSE REASSESS COMMENT FT1
pt is aox1 confused baseline. pt blood pressure elevated, prior RN gave blood pressure medication recently, pt denies n/v/dizziness/chest pain/sob.     fall precaution in place, bed alarm on, bed to lowest position, red socks on bed.

## 2025-01-17 NOTE — CONSULT NOTE ADULT - ASSESSMENT
IMPRESSION: Rehab of Parkinson's disease with decline function / HTN, hx ischemic CVA x2, type 2 DM, dementia and chronic back pain, Hx of seizures     PRECAUTIONS: [   ] Cardiac  [   ] Respiratory  [   ] Seizures [   ] Contact Isolation  [   ] Droplet Isolation  [   ] Other    Weight Bearing Status:     RECOMMENDATION:    Out of Bed to Chair     DVT/Decubiti Prophylaxis    REHAB PLAN:     [ x   ] Bedside P/T 3-5 times a week   [  x  ]   Bedside O/T  2-3 times a week             [    ] Speech Therapy               [    ]  No Rehab Therapy Indicated   Conditioning/ROM                                    ADL  Bed Mobility                                               Conditioning/ROM  Transfers                                                     Bed Mobility  Sitting /Standing Balance                         Transfers                                        Gait Training                                               Sitting/Standing Balance  Stair Training [   ]Applicable                    Home equipment Eval                                                                        Splinting  [   ] Only      GOALS:   ADL   [  x  ]   Independent                    Transfers  [  x  ] Independent                          Ambulation  [ x   ] Independent     [   x  ] With device                            [    ]  CG                                                         [    ]  CG                                                                  [    ] CG                            [    ] Min A                                                   [    ] Min A                                                              [    ] Min  A          DISCHARGE PLAN:   [    ]  Good candidate for Intensive Rehabilitation/Hospital based                                             Will tolerate 3hrs Intensive Rehab Daily                                       [     ]  Short Term Rehab in Skilled Nursing Facility                                       [     ]  Home with Outpatient or  services                                         [  x   ]  Possible Candidate for Intensive Hospital based Rehab

## 2025-01-17 NOTE — PROGRESS NOTE ADULT - SUBJECTIVE AND OBJECTIVE BOX
SUBJECTIVE/OVERNIGHT EVENTS  Today is hospital day 1d. This morning patient was seen and examined at bedside, resting comfortably in bed. No acute or major events overnight.    HOSPITAL COURSE  Day 1:   Day 2:   Day 3:     CODE STATUS:    FAMILY COMMUNICATION  Contact date:  Name of person contacted:  Relationship to patient:  Communication details:    MEDICATIONS  STANDING MEDICATIONS  aspirin  chewable 81 milliGRAM(s) Oral daily  carbidopa/levodopa  25/100 2 Tablet(s) Oral three times a day  carbidopa/levodopa CR 50/200 1 Tablet(s) Oral <User Schedule>  carvedilol 6.25 milliGRAM(s) Oral every 12 hours  dextrose 5%. 1000 milliLiter(s) IV Continuous <Continuous>  dextrose 5%. 1000 milliLiter(s) IV Continuous <Continuous>  dextrose 50% Injectable 25 Gram(s) IV Push once  dextrose 50% Injectable 12.5 Gram(s) IV Push once  dextrose 50% Injectable 25 Gram(s) IV Push once  enoxaparin Injectable 40 milliGRAM(s) SubCutaneous every 24 hours  entacapone 200 milliGRAM(s) Oral three times a day  gabapentin 100 milliGRAM(s) Oral at bedtime  glucagon  Injectable 1 milliGRAM(s) IntraMuscular once  insulin lispro (ADMELOG) corrective regimen sliding scale   SubCutaneous three times a day before meals  levETIRAcetam 500 milliGRAM(s) Oral two times a day  mirtazapine 7.5 milliGRAM(s) Oral daily  polyethylene glycol 3350 17 Gram(s) Oral daily  rivastigmine patch  9.5 mG/24 Hr(s) 1 Patch Transdermal every 24 hours  senna 2 Tablet(s) Oral at bedtime  traZODone 100 milliGRAM(s) Oral at bedtime    PRN MEDICATIONS  acetaminophen     Tablet .. 650 milliGRAM(s) Oral every 6 hours PRN  dextrose Oral Gel 15 Gram(s) Oral once PRN  melatonin 3 milliGRAM(s) Oral at bedtime PRN    VITALS  T(F): 98.9 (25 @ 07:34), Max: 98.9 (25 @ 07:34)  HR: 63 (25 @ 07:34) (56 - 63)  BP: 175/99 (25 @ 07:34) (157/85 - 189/90)  RR: 18 (25 @ 07:34) (18 - 19)  SpO2: 96% (25 @ 07:34) (96% - 98%)  POCT Blood Glucose.: 73 mg/dL (25 @ 08:09)  POCT Blood Glucose.: 138 mg/dL (25 @ 12:32)    PHYSICAL EXAM  GENERAL  (  ) NAD, lying in bed comfortably     (  ) obtunded     (  ) lethargic     (  ) somnolent    HEAD  (  ) Atraumatic     (  ) hematoma     (  ) laceration (specify location:       )     NECK  (  ) Supple     (  ) neck stiffness     (  ) nuchal rigidity     (  )  no JVD     (  ) JVD present ( -- cm)    HEART  Rate -->  (  ) normal rate    (  ) bradycardic    (  ) tachycardic  Rhythm -->  (  ) regular    (  ) regularly irregular    (  ) irregularly irregular  Murmurs -->  (  ) normal s1/s2    (  ) systolic murmur    (  ) diastolic murmur    (  ) continuous murmur     (  ) S3 present    (  ) S4 present    LUNGS  (  )Unlabored respirations     (  ) tachypnea  (  ) B/L air entry     (  ) decreased breath sounds in:  (location     )    (  ) no adventitious sound     (  ) crackles     (  ) wheezing      (  ) rhonchi      (specify location:       )  (  ) chest wall tenderness (specify location:       )    ABDOMEN  (  ) Soft     (  ) tense   |   (  ) nondistended     (  ) distended   |   (  ) +BS     (  ) hypoactive bowel sounds     (  ) hyperactive bowel sounds  (  ) nontender     (  ) RUQ tenderness     (  ) RLQ tenderness     (  ) LLQ tenderness     (  ) epigastric tenderness     (  ) diffuse tenderness  (  ) Splenomegaly      (  ) Hepatomegaly      (  ) Jaundice     (  ) ecchymosis     EXTREMITIES  (  ) Normal     (  ) Rash     (  ) ecchymosis     (  ) varicose veins      (  ) pitting edema     (  ) non-pitting edema   (  ) ulceration     (  ) gangrene:     (location:     )    NERVOUS SYSTEM  (  ) A&Ox3     (  ) confused     (  ) lethargic  CN II-XII:     (  ) Intact     (  ) focal deficits  (Specify:     )   Upper extremities:     (  ) strength X/5     (  ) focal deficit (specify:    )  Lower extremities:     (  ) strength  X/5    (  ) focal deficit (specify:    )    SKIN  (  ) No rashes or lesions     (  ) maculopapular rash     (  ) pustules     (  ) vesicles     (  ) ulcer     (  ) ecchymosis     (specify location:     )    (  ) Indwelling Ewing Catheter   Date insterted:    Reason (  ) Critical illness     (  ) urinary retention    (  ) Accurate Ins/Outs Monitoring     (  ) CMO patient    (  ) Central Line  Date inserted:  Location: (  ) Right IJ   (  ) Left IJ   (  ) Right Fem   (  ) Left Fem    (  ) SPC  (  ) pigtail  (  ) PEG tube  (  ) colostomy  (  ) jejunostomy  (  ) U-Dall    LABS             15.5   8.61  )-----------( 136      ( 25 @ 12:40 )             44.8     142  |  106  |  15  -------------------------<  141   25 @ 12:40  4.3  |  26  |  1.1    Ca      8.7     25 @ 12:40  Mg     1.8     01-15-25 @ 12:00    TPro  5.8  /  Alb  3.7  /  TBili  1.1  /  DBili  x   /  AST  21  /  ALT  <5  /  AlkPhos  73  /  GGT  x     25 @ 12:40      Troponin T, High Sensitivity Result: 9 ng/L (01-15-25 @ 13:54)  Troponin T, High Sensitivity Result: 11 ng/L (01-15-25 @ 12:00)    Urinalysis Basic - ( 2025 15:23 )    Color: Dark Yellow / Appearance: Clear / S.029 / pH: x  Gluc: x / Ketone: 40 mg/dL  / Bili: Small / Urobili: 1.0 mg/dL   Blood: x / Protein: 100 mg/dL / Nitrite: Negative   Leuk Esterase: Trace / RBC: 8 /HPF / WBC 4 /HPF   Sq Epi: x / Non Sq Epi: 1 /HPF / Bacteria: Negative /HPF          Urinalysis with Rflx Culture (collected 2025 15:23)      IMAGING

## 2025-01-17 NOTE — PATIENT PROFILE ADULT - FALL HARM RISK - HARM RISK INTERVENTIONS
Assistance with ambulation/Assistance OOB with selected safe patient handling equipment/Communicate Risk of Fall with Harm to all staff/Monitor for mental status changes/Monitor gait and stability/Reinforce activity limits and safety measures with patient and family/Tailored Fall Risk Interventions/Use of alarms - bed, chair and/or voice tab/Visual Cue: Yellow wristband and red socks/Bed in lowest position, wheels locked, appropriate side rails in place/Call bell, personal items and telephone in reach/Instruct patient to call for assistance before getting out of bed or chair/Non-slip footwear when patient is out of bed/[place] to call system/Physically safe environment - no spills, clutter or unnecessary equipment/Purposeful Proactive Rounding/Room/bathroom lighting operational, light cord in reach

## 2025-01-18 LAB
A1C WITH ESTIMATED AVERAGE GLUCOSE RESULT: 4.9 % — SIGNIFICANT CHANGE UP (ref 4–5.6)
ALBUMIN SERPL ELPH-MCNC: 3.6 G/DL — SIGNIFICANT CHANGE UP (ref 3.5–5.2)
ALP SERPL-CCNC: 68 U/L — SIGNIFICANT CHANGE UP (ref 30–115)
ALT FLD-CCNC: <5 U/L — SIGNIFICANT CHANGE UP (ref 0–41)
ANION GAP SERPL CALC-SCNC: 13 MMOL/L — SIGNIFICANT CHANGE UP (ref 7–14)
AST SERPL-CCNC: 23 U/L — SIGNIFICANT CHANGE UP (ref 0–41)
BASOPHILS # BLD AUTO: 0.02 K/UL — SIGNIFICANT CHANGE UP (ref 0–0.2)
BASOPHILS NFR BLD AUTO: 0.3 % — SIGNIFICANT CHANGE UP (ref 0–1)
BILIRUB SERPL-MCNC: 1.1 MG/DL — SIGNIFICANT CHANGE UP (ref 0.2–1.2)
BUN SERPL-MCNC: 8 MG/DL — LOW (ref 10–20)
CALCIUM SERPL-MCNC: 8.3 MG/DL — LOW (ref 8.4–10.5)
CHLORIDE SERPL-SCNC: 106 MMOL/L — SIGNIFICANT CHANGE UP (ref 98–110)
CO2 SERPL-SCNC: 23 MMOL/L — SIGNIFICANT CHANGE UP (ref 17–32)
CREAT SERPL-MCNC: 0.9 MG/DL — SIGNIFICANT CHANGE UP (ref 0.7–1.5)
EGFR: 91 ML/MIN/1.73M2 — SIGNIFICANT CHANGE UP
EOSINOPHIL # BLD AUTO: 0.11 K/UL — SIGNIFICANT CHANGE UP (ref 0–0.7)
EOSINOPHIL NFR BLD AUTO: 1.5 % — SIGNIFICANT CHANGE UP (ref 0–8)
ESTIMATED AVERAGE GLUCOSE: 94 MG/DL — SIGNIFICANT CHANGE UP (ref 68–114)
GLUCOSE BLDC GLUCOMTR-MCNC: 101 MG/DL — HIGH (ref 70–99)
GLUCOSE BLDC GLUCOMTR-MCNC: 106 MG/DL — HIGH (ref 70–99)
GLUCOSE BLDC GLUCOMTR-MCNC: 150 MG/DL — HIGH (ref 70–99)
GLUCOSE BLDC GLUCOMTR-MCNC: 84 MG/DL — SIGNIFICANT CHANGE UP (ref 70–99)
GLUCOSE SERPL-MCNC: 78 MG/DL — SIGNIFICANT CHANGE UP (ref 70–99)
HCT VFR BLD CALC: 44 % — SIGNIFICANT CHANGE UP (ref 42–52)
HGB BLD-MCNC: 15.4 G/DL — SIGNIFICANT CHANGE UP (ref 14–18)
IMM GRANULOCYTES NFR BLD AUTO: 0.3 % — SIGNIFICANT CHANGE UP (ref 0.1–0.3)
LYMPHOCYTES # BLD AUTO: 2.31 K/UL — SIGNIFICANT CHANGE UP (ref 1.2–3.4)
LYMPHOCYTES # BLD AUTO: 30.5 % — SIGNIFICANT CHANGE UP (ref 20.5–51.1)
MAGNESIUM SERPL-MCNC: 1.8 MG/DL — SIGNIFICANT CHANGE UP (ref 1.8–2.4)
MCHC RBC-ENTMCNC: 34.4 PG — HIGH (ref 27–31)
MCHC RBC-ENTMCNC: 35 G/DL — SIGNIFICANT CHANGE UP (ref 32–37)
MCV RBC AUTO: 98.2 FL — HIGH (ref 80–94)
MONOCYTES # BLD AUTO: 0.83 K/UL — HIGH (ref 0.1–0.6)
MONOCYTES NFR BLD AUTO: 10.9 % — HIGH (ref 1.7–9.3)
NEUTROPHILS # BLD AUTO: 4.29 K/UL — SIGNIFICANT CHANGE UP (ref 1.4–6.5)
NEUTROPHILS NFR BLD AUTO: 56.5 % — SIGNIFICANT CHANGE UP (ref 42.2–75.2)
NRBC # BLD: 0 /100 WBCS — SIGNIFICANT CHANGE UP (ref 0–0)
NRBC BLD-RTO: 0 /100 WBCS — SIGNIFICANT CHANGE UP (ref 0–0)
PLATELET # BLD AUTO: 101 K/UL — LOW (ref 130–400)
PMV BLD: 10.3 FL — SIGNIFICANT CHANGE UP (ref 7.4–10.4)
POTASSIUM SERPL-MCNC: 4.5 MMOL/L — SIGNIFICANT CHANGE UP (ref 3.5–5)
POTASSIUM SERPL-SCNC: 4.5 MMOL/L — SIGNIFICANT CHANGE UP (ref 3.5–5)
PROT SERPL-MCNC: 5.6 G/DL — LOW (ref 6–8)
RBC # BLD: 4.48 M/UL — LOW (ref 4.7–6.1)
RBC # FLD: 11.9 % — SIGNIFICANT CHANGE UP (ref 11.5–14.5)
SODIUM SERPL-SCNC: 142 MMOL/L — SIGNIFICANT CHANGE UP (ref 135–146)
WBC # BLD: 7.58 K/UL — SIGNIFICANT CHANGE UP (ref 4.8–10.8)
WBC # FLD AUTO: 7.58 K/UL — SIGNIFICANT CHANGE UP (ref 4.8–10.8)

## 2025-01-18 PROCEDURE — 99232 SBSQ HOSP IP/OBS MODERATE 35: CPT

## 2025-01-18 RX ADMIN — Medication 2 TABLET(S): at 21:42

## 2025-01-18 RX ADMIN — RIVASTIGMINE 1 PATCH: 4.6 PATCH, EXTENDED RELEASE TRANSDERMAL at 06:03

## 2025-01-18 RX ADMIN — Medication 6.25 MILLIGRAM(S): at 17:23

## 2025-01-18 RX ADMIN — ENTACAPONE 200 MILLIGRAM(S): 200 TABLET, FILM COATED ORAL at 21:42

## 2025-01-18 RX ADMIN — ASPIRIN 81 MILLIGRAM(S): 81 TABLET, COATED ORAL at 11:52

## 2025-01-18 RX ADMIN — MIRTAZAPINE 7.5 MILLIGRAM(S): 30 TABLET, FILM COATED ORAL at 11:52

## 2025-01-18 RX ADMIN — Medication 2 TABLET(S): at 13:05

## 2025-01-18 RX ADMIN — RIVASTIGMINE 1 PATCH: 4.6 PATCH, EXTENDED RELEASE TRANSDERMAL at 06:05

## 2025-01-18 RX ADMIN — ENOXAPARIN SODIUM 40 MILLIGRAM(S): 100 INJECTION SUBCUTANEOUS at 06:06

## 2025-01-18 RX ADMIN — Medication 100 MILLIGRAM(S): at 21:42

## 2025-01-18 RX ADMIN — GABAPENTIN 100 MILLIGRAM(S): 800 TABLET ORAL at 21:43

## 2025-01-18 RX ADMIN — Medication 6.25 MILLIGRAM(S): at 06:07

## 2025-01-18 RX ADMIN — LEVETIRACETAM 500 MILLIGRAM(S): 750 TABLET, FILM COATED ORAL at 17:24

## 2025-01-18 RX ADMIN — Medication 2 TABLET(S): at 06:07

## 2025-01-18 RX ADMIN — LEVETIRACETAM 500 MILLIGRAM(S): 750 TABLET, FILM COATED ORAL at 06:08

## 2025-01-18 RX ADMIN — ENTACAPONE 200 MILLIGRAM(S): 200 TABLET, FILM COATED ORAL at 06:08

## 2025-01-18 RX ADMIN — RIVASTIGMINE 1 PATCH: 4.6 PATCH, EXTENDED RELEASE TRANSDERMAL at 07:34

## 2025-01-18 RX ADMIN — RIVASTIGMINE 1 PATCH: 4.6 PATCH, EXTENDED RELEASE TRANSDERMAL at 18:37

## 2025-01-18 RX ADMIN — Medication 1 TABLET(S): at 17:23

## 2025-01-18 RX ADMIN — ENTACAPONE 200 MILLIGRAM(S): 200 TABLET, FILM COATED ORAL at 13:05

## 2025-01-18 RX ADMIN — POLYETHYLENE GLYCOL 3350 17 GRAM(S): 17 POWDER, FOR SOLUTION ORAL at 11:52

## 2025-01-18 NOTE — DISCHARGE NOTE PROVIDER - NSDCMRMEDTOKEN_GEN_ALL_CORE_FT
acetaminophen 325 mg oral tablet: 2 tab(s) orally 3 times a day as needed for Mild Pain (1 - 3)  aspirin 81 mg oral tablet, chewable: 1 tab(s) orally once a day  carbidopa-levodopa 50 mg-200 mg oral tablet, extended release: 1 tab(s) orally once a day (at bedtime)  Coreg 6.25 mg oral tablet: 1 tab(s) orally 2 times a day  entacapone 200 mg oral tablet: 1 tab(s) orally 3 times a day at 7am, 12pm, and 5pm with Sinemet  gabapentin 100 mg oral capsule: 1 cap(s) orally once a day (at bedtime)  levETIRAcetam 500 mg oral tablet: 1 tab(s) orally 2 times a day  Melatonin 3 mg oral tablet: 1 tab(s) orally once a day (at bedtime)  mirtazapine 7.5 mg oral tablet: 2 tab(s) orally once a day (at bedtime)  polyethylene glycol 3350 oral powder for reconstitution: 17 gram(s) orally 3 times a week  rivastigmine 9.5 mg/24 hr transdermal film, extended release: 1 patch transdermal every 24 hours  senna leaf extract oral tablet: 2 tab(s) orally once a day (at bedtime)  traZODone 100 mg oral tablet: orally once a day (at bedtime)   acetaminophen 325 mg oral tablet: 2 tab(s) orally 3 times a day as needed for Mild Pain (1 - 3)  amLODIPine 5 mg oral tablet: 1 tab(s) orally once a day  aspirin 81 mg oral tablet, chewable: 1 tab(s) orally once a day  atorvastatin 80 mg oral tablet: 1 tab(s) orally once a day (at bedtime)  carbidopa-levodopa 50 mg-200 mg oral tablet, extended release: 1 tab(s) orally once a day (at bedtime)  clopidogrel 75 mg oral tablet: 1 tab(s) orally once a day  Coreg 3.125 mg oral tablet: 1 tab(s) orally every 12 hours  entacapone 200 mg oral tablet: 1 tab(s) orally 3 times a day at 7am, 12pm, and 5pm with Sinemet  gabapentin 100 mg oral capsule: 1 cap(s) orally once a day (at bedtime)  levETIRAcetam 500 mg oral tablet: 1 tab(s) orally 2 times a day  Melatonin 3 mg oral tablet: 1 tab(s) orally once a day (at bedtime)  mirtazapine 7.5 mg oral tablet: 2 tab(s) orally once a day (at bedtime)  polyethylene glycol 3350 oral powder for reconstitution: 17 gram(s) orally 3 times a week  rivastigmine 9.5 mg/24 hr transdermal film, extended release: 1 patch transdermal every 24 hours  senna leaf extract oral tablet: 2 tab(s) orally once a day (at bedtime)  traZODone 100 mg oral tablet: orally once a day (at bedtime)   acetaminophen 325 mg oral tablet: 2 tab(s) orally 3 times a day as needed for Mild Pain (1 - 3)  amLODIPine 5 mg oral tablet: 1 tab(s) orally once a day  aspirin 81 mg oral tablet, chewable: 1 tab(s) orally once a day  atorvastatin 80 mg oral tablet: 1 tab(s) orally once a day (at bedtime)  carbidopa-levodopa 50 mg-200 mg oral tablet, extended release: 1 tab(s) orally once a day (at bedtime)  clopidogrel 75 mg oral tablet: 1 tab(s) orally once a day FOR 88 DAYS STARTING 1/25  Coreg 3.125 mg oral tablet: 1 tab(s) orally every 12 hours  entacapone 200 mg oral tablet: 1 tab(s) orally 3 times a day at 7am, 12pm, and 5pm with Sinemet  gabapentin 100 mg oral capsule: 1 cap(s) orally once a day (at bedtime)  levETIRAcetam 500 mg oral tablet: 1 tab(s) orally 2 times a day  Melatonin 3 mg oral tablet: 1 tab(s) orally once a day (at bedtime)  mirtazapine 7.5 mg oral tablet: 2 tab(s) orally once a day (at bedtime)  polyethylene glycol 3350 oral powder for reconstitution: 17 gram(s) orally 3 times a week  rivastigmine 9.5 mg/24 hr transdermal film, extended release: 1 patch transdermal every 24 hours  senna leaf extract oral tablet: 2 tab(s) orally once a day (at bedtime)  traZODone 100 mg oral tablet: orally once a day (at bedtime)

## 2025-01-18 NOTE — OCCUPATIONAL THERAPY INITIAL EVALUATION ADULT - PERTINENT HX OF CURRENT PROBLEM, REHAB EVAL
74M PMH of Parkinson's, HTN, hx ischemic CVA x2, type 2 DM, dementia and chronic back pain, Hx of seizures on keppra BIBA from [facility] facility  or generalized weakness, confusion, decreased PO intake and multiple witnessed falls over the past few days. He had two witnessed falls the day prior to admission where he was weak and slumped to the ground. No LOC, not on AC. Per staff, he is normally alert and oriented x 3 and able to perform ADLs, but for the past few days has been increasingly confused. He was evaluated in the ED the day prior for similar complaints, had a negative work up and was discharged.  The Patient is A+Ox2-3 on interview, poor historian, cannot recollect the events that occurred, does endorse pain around the left eye.   Denies HA, f/c, CP, SOB, abdominal pain, dysuria, n/v/diarrhea, blood in stool or urine  Tried reaching [facility] for further information with no answer.

## 2025-01-18 NOTE — OCCUPATIONAL THERAPY INITIAL EVALUATION ADULT - GENERAL OBSERVATIONS, REHAB EVAL
Pt. received resting in bed in NAD, +IV lock. Pt. is pleasant with periods of confusion/forgetfulness. MEDARDO Vitale covering informed Pt. VSS stable. Pt is AAOX2 and agreed to therapy/activity.

## 2025-01-18 NOTE — DISCHARGE NOTE PROVIDER - PROVIDER TOKENS
PROVIDER:[TOKEN:[[id number]],FOLLOWUP:[2 weeks]] PROVIDER:[TOKEN:[[id number]],FOLLOWUP:[2 weeks]],PROVIDER:[TOKEN:[[id number]],FOLLOWUP:[2 weeks]] PROVIDER:[TOKEN:[[id number]],FOLLOWUP:[2 weeks]],PROVIDER:[TOKEN:[[id number]],FOLLOWUP:[2 weeks]],PROVIDER:[TOKEN:[[id number]],FOLLOWUP:[1 month]]

## 2025-01-18 NOTE — DISCHARGE NOTE PROVIDER - DISCHARGE DIET
Consistent Carbohydrate Diabetic Diets DASH Diet/Soft and Bite-Sized Diet/Consistent Carbohydrate Diabetic Diets

## 2025-01-18 NOTE — OCCUPATIONAL THERAPY INITIAL EVALUATION ADULT - LIVES WITH, PROFILE
As per Pt, lives in an apt building + elevator. Per medical records, Pt. is a resident from [facility].

## 2025-01-18 NOTE — PHYSICAL THERAPY INITIAL EVALUATION ADULT - PERTINENT HX OF CURRENT PROBLEM, REHAB EVAL
74M PMH of Parkinson's, HTN, hx ischemic CVA x2, type 2 DM, dementia and chronic back pain, Hx of seizures on keppra BIBA from [place] facility  or generalized weakness, confusion, decreased PO intake and multiple witnessed falls over the past few days

## 2025-01-18 NOTE — DISCHARGE NOTE PROVIDER - NSDCCAREPROVSEEN_GEN_ALL_CORE_FT
[de-identified] : X-rays of his right femur taken today including 2 views are reviewed. These show no changes in the alignment with a progressive healing of the fracture. Hardware in place
Tom Locke

## 2025-01-18 NOTE — PHYSICAL THERAPY INITIAL EVALUATION ADULT - GENERAL OBSERVATIONS, REHAB EVAL
PT IE [id number]. Chart reviewed. Pt encountered semi-reclined in bed. In NAD. + IV lock. pt is alert, oriented 1-2 and is able to follow commands.

## 2025-01-18 NOTE — OCCUPATIONAL THERAPY INITIAL EVALUATION ADULT - ADL RETRAINING, OT EVAL
Pt. will perform LB dressing with use of AE with SUP by D/C. Pt. will perform toileting tasks/clothing management with close supervision by D/C.

## 2025-01-18 NOTE — PROGRESS NOTE ADULT - ASSESSMENT
74 year old Male, PMH of Parkinson's, HTN, hx ischemic CVA x2, type 2 DM, dementia and chronic back pain, Hx of seizures on keppra, BIBA from [place] facility  or generalized weakness, confusion, decreased PO intake and multiple witnessed falls over the past few days. He had two witnessed falls the day prior to admission where he was weak and slumped to the ground. No LOC, not on AC. Per staff, he is normally alert and oriented x 3 and able to perform ADLs, but for the past few days has been increasingly confused. He was evaluated in the ED the day prior for similar complaints, had a negative work up and was discharged.  The Patient is A+Ox2-3 on interview, poor historian, cannot recollect the events that occurred, does endorse pain around the left eye.   Denies HA, f/c, CP, SOB, abdominal pain, dysuria, n/v/diarrhea, blood in stool or urine  Tried reaching [facility] for further information with no answer.     1. Generalized weakness associated with Recurrent Falls    in a pt with parkinson's disease and dementia           -TSH/ VIt D >> wnl  - Trauma w/up: CTH (1/15) neg, CXR negative, xray pelvis no acute fracture  - Infectious workup: SIRS negative, afebrile, no elevated WBC, CXR wnl, UA negative - trop 11-->9  - f/u Orthostatic vitals    - Fall precautions, frequent orientation   - c/w home meds: carbidopa/levodopa, entacapone, rivastigmine patch (submitted nonformulary- f/u if accepted)  - Palliative care consult appreciated     2. Hx of CVA  - MRI 1/21/23: There is no evidence of acute intracranial pathology. No evidence of acute infarct, intracranial hemorrhage or mass effect. Moderate chronic microvascular type changes as well as chronic lacunar infarcts as detailed.  - Not on statin? unclear why  - C/w aspirin     3. Hx of seizures  - C/w Keppra    4.  HTN  - not currently on any home meds  - Monitor     5.  DM II ??   - Hgba1c 4.9     -FS, ISS    6. Hx of constipation   - Cont laxative       #DVT prophylaxis: Lovenox 40mg   #GI prophylaxis: not needed  #Diet: cc  #Activity: increase as tolerated    Pending: Orthostatics/   Dispo: 4A vs STR

## 2025-01-18 NOTE — DISCHARGE NOTE PROVIDER - CARE PROVIDERS DIRECT ADDRESSES
,[email or web address]

## 2025-01-18 NOTE — DISCHARGE NOTE PROVIDER - HOSPITAL COURSE
74 year old Male, PMH of Parkinson's, HTN, hx ischemic CVA x2, type 2 DM, dementia and chronic back pain, Hx of seizures on keppra, BIBA from [place] facility  or generalized weakness, confusion, decreased PO intake and multiple witnessed falls over the past few days. He had two witnessed falls the day prior to admission where he was weak and slumped to the ground. No LOC, not on AC. Per staff, he is normally alert and oriented x 3 and able to perform ADLs, but for the past few days has been increasingly confused. He was evaluated in the ED the day prior for similar complaints, had a negative work up and was discharged.  The Patient is A+Ox2-3 on interview, poor historian, cannot recollect the events that occurred, does endorse pain around the left eye.   Denies HA, f/c, CP, SOB, abdominal pain, dysuria, n/v/diarrhea, blood in stool or urine      Admitted for generalized weakness and recurrent falls. Trauma workup, including CT head, chest x-ray, and pelvis x-ray, was negative for acute injury. Infectious workup was also negative. Troponin levels, initially elevated, trended down. The patient was placed on fall precautions and given frequent orientation. Home medications, including carbidopa/levodopa, entacapone, and rivastigmine patch, were continued. Physical therapy recommended rehabilitation, and a physiatry consult deemed the patient a possible candidate for inpatient rehab. An MRI brain showed no acute intracranial pathology but revealed chronic microvascular changes and lacunar infarcts. The patient was not on a statin but continued on aspirin. Keppra was continued for seizure history. Hypertension was monitored but no antihypertensive medications were started.    A/P  1. Generalized weakness associated with Recurrent Falls in a pt with parkinson disease and dementia   - Admit to medicine         -TSH:wnl         - VIt D:wnl  - Trauma w/up: CTH (1/15) neg, CXR negative, xray pelvis no acute fracture  - Infectious workup: SIRS negative, afebrile, no elevated WBC, CXR wnl, UA negative \  - trop 11-->9  - Fall precautions, frequent orientation   - c/w home meds: carbidopa/levodopa, entacapone, rivastigmine patch (submitted nonformulary- f/u if accepted)  - PT eval-> rec rehab  - Physiatry consult: poss candidate for inpatient rehab    2. Hx of CVA  - MRI 1/21/23: There is no evidence of acute intracranial pathology. No evidence of acute infarct, intracranial hemorrhage or mass effect. Moderate chronic microvascular type changes as well as chronic lacunar infarcts as detailed.  - Not on statin  - C/w aspirin     3. Hx of seizures  - C/w Keppra    4.  HTN  - not currently on any home meds  - Monitor     5.  DM II  - Hgba1c:pending   -FS, ISS    6. Hx of constipation   - Cont laxative      Hospital course:  73 y/o M with a PMHx of Parkinson's disease, HTN, ischemic stroke (1998 and 2016 with residual expressive aphasia and cognitive deficits), T2DM, gout, dementia and chronic back pain, seizures on Keppra, admitted to medicine for AMS, Neurology was consulted for AMS despite lack of metabolic derangements. CTH on 1/15 negative. Evaluated by general neurology on 1/21 and noted to have R facial droop, brisk R patellar reflex, positive R babinski reflex, a trembling in his lower left lip, and a left-handed resting tremor with a postural component.  MRI brain 1/21 showed acute L corona radiata infarct. On follow-up exam on 1/22 was noted to have new onset L tongue deviation and mild dysarthria. Transferred to stroke service for stroke management and repeat MRH. Considering neuroimaging suspect L corona radiata stroke secondary to ICAD (R M2 stenosis).     Patient had the following workup done in house:  [1/15] HCT: There are chronic lacunar infarcts involving the left basal ganglia as   well as the bilateral thalami. There is confluent periventricular as well   as subcortical white matter hypodensity. There is no intraparenchymal   hematoma, mass effect or midline shift. No abnormal extra-axial fluid   collections are present.  [1/22] CTA head/Neck:   1.  Severe stenosis left MCA proximal M2   2.  Patent carotid bifurcations  3.  Moderate narrowing right proximal vertebral artery.  [1/21] MRI brain w/out:   1.  Acute white matter infarct left corona radiata   2.  Moderate microvascular ischemic changes.   Multiple foci of susceptibility artifact demonstrated predominately within the basal ganglia and cerebellum noted. This can correlate with hypertension.   [1/21] TTE EF 69%   [1/22] repeat CTH Compared with the CT scan of 1/15/2025, there is a new lucency in the left corona radiata, adjacent to the left lateral ventricle, consistent   with an acute infarction, as noted on the earlier reported MRI of   1/21/2025 (2/20).  Chronic lacunar infarcts in the right and left thalamus and left basal ganglia are again noted,  Carotid siphon calcifications are noted.  There are patchy hypodensities throughout the hemispheric white matter without mass effect compatible with chronic microvascular changes.    Physical exam at discharge:  GENERAL: well-developed, well-nourished, in no acute distress    NEUROLOGIC:  Mental status: AOx3  Language: Speech with intact fluency , repetition , comprehension, / present anomic aphasia improved from yesterday (able to name most objects), present dysarthria  Cranial nerves:   II: Visual fields are full to confrontation  III, IV, VI: Extraocular movements intact, no noticeable nystagmus, pupils equally round and reactive to light  V:  Facial sensation V1-V3 equal and intact  VII: R minor facial droop  VIII: Hearing is intact bilaterally intact  IX, X: Uvula is midline and soft palate rises symmetrically  XI: Head turning and shoulder shrug are intact ad symmetric  XII: Tongue deviated to R side, able to center his tongue and move it to the L side  Motor:  - shoulder abduction     R 5/5     L 5/5  - elbow flexion     R 5/5     L 5/5  - elbow extension     R 5/5     L 5/5  - wrist flexion     R 5/5     L 5/5  - wrist extension     R 5/5     L 5/5  - finger flexion     R 5/5     L 5/5  - finger extension     R 5/5     L 5/5  - thumb opposition     R 5/5     L 5/5  - hip flexion     R 5/5     L 5/5  - knee flexion     R 5/5     L 5/5  - knee extension     R 5/5     L 5/5  - dorsiflexion     R 5/5     L 5/5  - plantarflexion     R 5/5     L 5/5  Sensation: Intact to light touch bilaterally (initially said reduced sensation R face, UE, LE, however reported sensation symmetrical on retesting 1 hour later). No neglect or extinction on double simultaneous testing.  Coordination: No dysmetria on finger-to-nose and heel-to-shin bilaterally, rapid alternating movements intact and symmetric  Reflexes:   - biceps     R 2+     L 2+  - triceps     R 2+     L 2+  - brachioradialis     R 2+     L 2+  - patellar     R 3+     L 3+  - Achilles     R 2+     L 2+  - Babinski     R downgoing    L downgoing  Movement: resting tremor low amplitude low frequency R hand with postural component, rigidity R >L, bradykinesia on hand opening/closing and foot tapping bilateral   Gait: Deferred at this encounter    NIHSS: 3 (R minor facial droop 1, mild anomic aphasia 1, mild dysarthria 1)    mRS at discharge: 4    Medication changes on discharge: clopidogrel 75mg daily 90 days , atorvastatin 80mg nightly , amlodipine 5mg daily , coreg 3.25g twice daily  Labs to be followed up on after discharge: n/a  Imaging to be done after discharge: repeat neuroimaging per stroke follow-up  Further workup after discharge: follow up stroke clinic , primary care provider , electrophysiology   Hospital course:  75 y/o M with a PMHx of Parkinson's disease, HTN, ischemic stroke (1998 and 2016 with residual expressive aphasia and cognitive deficits), T2DM, gout, dementia and chronic back pain, seizures on Keppra, admitted to medicine for AMS, Neurology was consulted for AMS despite lack of metabolic derangements. CTH on 1/15 negative. Evaluated by general neurology on 1/21 and noted to have R facial droop, brisk R patellar reflex, positive R babinski reflex, a trembling in his lower left lip, and a left-handed resting tremor with a postural component.  MRI brain 1/21 showed acute L corona radiata infarct. On follow-up exam on 1/22 was noted to have new onset L tongue deviation and mild dysarthria. Transferred to stroke service for stroke management and repeat MRH.     Considering neuroimaging suspect L corona radiata stroke secondary to ICAD (R M2 stenosis). Repeat MRH pursued to rule out new stroke considering new onset R tongue deviation however did not show acute findings suggesting that the tongue deviation may be a chronic finding that was not previously known.     Patient had the following workup done in house:  [1/15] HCT: There are chronic lacunar infarcts involving the left basal ganglia as   well as the bilateral thalami. There is confluent periventricular as well   as subcortical white matter hypodensity. There is no intraparenchymal   hematoma, mass effect or midline shift. No abnormal extra-axial fluid   collections are present.  [1/22] CTA head/Neck:   1.  Severe stenosis left MCA proximal M2   2.  Patent carotid bifurcations  3.  Moderate narrowing right proximal vertebral artery.  [1/21] MRI brain w/out:   1.  Acute white matter infarct left corona radiata   2.  Moderate microvascular ischemic changes.   Multiple foci of susceptibility artifact demonstrated predominately within the basal ganglia and cerebellum noted. This can correlate with hypertension.   [1/21] TTE EF 69%   [1/22] repeat CTH Compared with the CT scan of 1/15/2025, there is a new lucency in the left corona radiata, adjacent to the left lateral ventricle, consistent   with an acute infarction, as noted on the earlier reported MRI of   1/21/2025 (2/20).  Chronic lacunar infarcts in the right and left thalamus and left basal ganglia are again noted,  Carotid siphon calcifications are noted.  There are patchy hypodensities throughout the hemispheric white matter without mass effect compatible with chronic microvascular changes.  [1/23] repeat MRI  1.  Stable exam compared to 1/21/2025:  2.  Stable acute infarct within the left corona radiata/basal ganglia. No new infarct demonstrated.  3.  Stable moderate chronic microvascular changes and multiple chronic lacunar infarcts.    CORE MEASURES   [4.9] HbA1c  [101] LDL  [2.81] TSH    Physical exam at discharge:  GENERAL: well-developed, well-nourished, in no acute distress    NEUROLOGIC:  Mental status: AOx3  Language: Speech with intact fluency , repetition , comprehension, / present anomic aphasia improved from yesterday (able to name most objects), present dysarthria  Cranial nerves:   II: Visual fields are full to confrontation  III, IV, VI: Extraocular movements intact, no noticeable nystagmus, pupils equally round and reactive to light  V:  Facial sensation V1-V3 equal and intact  VII: R minor facial droop  VIII: Hearing is intact bilaterally intact  IX, X: Uvula is midline and soft palate rises symmetrically  XI: Head turning and shoulder shrug are intact ad symmetric  XII: Tongue deviated to R side, able to center his tongue and move it to the L side  Motor:  - shoulder abduction     R 5/5     L 5/5  - elbow flexion     R 5/5     L 5/5  - elbow extension     R 5/5     L 5/5  - wrist flexion     R 5/5     L 5/5  - wrist extension     R 5/5     L 5/5  - finger flexion     R 5/5     L 5/5  - finger extension     R 5/5     L 5/5  - thumb opposition     R 5/5     L 5/5  - hip flexion     R 5/5     L 5/5  - knee flexion     R 5/5     L 5/5  - knee extension     R 5/5     L 5/5  - dorsiflexion     R 5/5     L 5/5  - plantarflexion     R 5/5     L 5/5  Sensation: Intact to light touch bilaterally (initially said reduced sensation R face, UE, LE, however reported sensation symmetrical on retesting 1 hour later). No neglect or extinction on double simultaneous testing.  Coordination: No dysmetria on finger-to-nose and heel-to-shin bilaterally, rapid alternating movements intact and symmetric  Reflexes:   - biceps     R 2+     L 2+  - triceps     R 2+     L 2+  - brachioradialis     R 2+     L 2+  - patellar     R 3+     L 3+  - Achilles     R 2+     L 2+  - Babinski     R downgoing    L downgoing  Movement: resting tremor low amplitude low frequency R hand with postural component, rigidity R >L, bradykinesia on hand opening/closing and foot tapping bilateral   Gait: Deferred at this encounter    NIHSS: 3 (R minor facial droop 1, mild anomic aphasia 1, mild dysarthria 1)    mRS at discharge: 3    Medication changes on discharge: clopidogrel 75mg daily to complete 90 days , atorvastatin 80mg nightly , amlodipine 5mg daily , coreg 3.25g twice daily  Labs to be followed up on after discharge: n/a  Imaging to be done after discharge: repeat neuroimaging per stroke follow-up  Further workup after discharge: follow up stroke clinic , primary care provider , electrophysiology

## 2025-01-18 NOTE — DISCHARGE NOTE PROVIDER - NSDCCPCAREPLAN_GEN_ALL_CORE_FT
PRINCIPAL DISCHARGE DIAGNOSIS  Diagnosis: Multiple falls  Assessment and Plan of Treatment: You came to the hospital because you were feeling weak, confused, and you had fallen a few times. You were also eating less than usual. The nurses at your facility said you're usually more alert and able to do things on your own, so they were worried. We did a lot of tests to make sure you didn't hurt yourself when you fell and to see if you had an infection, but everything looked okay. We also checked your heart. We kept you safe from falling while you were here and reminded you where you were. You kept taking your regular medications for Parkinson's, and we also continued your seizure medicine. We checked your blood pressure and blood sugar. Physical therapy saw you and thought you would benefit from some rehabilitation, and another doctor agreed that might be a good option for you. An MRI of your brain didn't show anything new, but it did show some older changes     PRINCIPAL DISCHARGE DIAGNOSIS  Diagnosis: Stroke  Assessment and Plan of Treatment: You were admitted for evaluation and management of: altered mental status and found to have a stroke  You were evaluated with: CT, MRI, echocardiogram  You were treated with: blood pressure optimization , aspirin , plavix  On discharge please do the following:   - take all medications as prescribed, you are only taking plavix until 4/22  - follow up with a primary care provider  - follow up with stroke neurology  - follow up EP  - May remove loop recorder bandaid in 2 days  - May shower in 2 days  Please return to the ED if you feel unwell, or if you experience worsening symptoms, or any of the following symptoms but not limited to: chest pain, palpitations, shortness of breath, falls, confusion, seizures, sudden balance problems, double vision, vision problems, facial droop, arm or leg weakness, slurring, bleeding, severe pain

## 2025-01-18 NOTE — PROGRESS NOTE ADULT - SUBJECTIVE AND OBJECTIVE BOX
ZAKIA MILLS  74y  Male      Patient is a 74y old  Male who presents with a chief complaint of Recurrent witnessed falls, AMS (18 Jan 2025 09:41)      INTERVAL HPI/OVERNIGHT EVENTS:      ******************************* REVIEW OF SYSTEMS:**********************************************    CONSTITUTIONAL: No fever, weight loss, or fatigue  RESPIRATORY: No cough, wheezing, chills or hemoptysis; No shortness of breath  CARDIOVASCULAR: No chest pain, palpitations, dizziness, or leg swelling  GASTROINTESTINAL: No abdominal or epigastric pain. No nausea, vomiting, or hematemesis; No diarrhea or constipation. No melena or hematochezia.  GENITOURINARY: No dysuria, frequency, hematuria, or incontinence  NEUROLOGICAL: No headaches, memory loss, loss of strength, numbness, or tremors  SKIN: No itching, burning, rashes, or lesions   MUSCULOSKELETAL: No joint pain or swelling; No muscle, back, or extremity pain  PSYCHIATRIC: No depression, anxiety, mood swings, or difficulty sleeping    All other review of systems negative    *********************** VITALS ******************************************    T(F): 97.6 (01-18-25 @ 12:27)  HR: 64 (01-18-25 @ 12:27) (63 - 67)  BP: 162/91 (01-18-25 @ 12:27) (136/77 - 185/94)  RR: 20 (01-18-25 @ 12:27) (18 - 20)  SpO2: 97% (01-18-25 @ 05:37) (95% - 98%)            ******************************** PHYSICAL EXAM:**************************************************  GENERAL: NAD    PSYCH: no agitation, baseline mentation  HEENT:     NERVOUS SYSTEM:  Alert & Oriented X3, MS  5/5 B/L  UE and LE ; Sensory intact    PULMONARY: LUCIE, CTA    CARDIOVASCULAR: S1S2 RRR    GI: Soft, NT, ND; BS present.    EXTREMITIES:  2+ Peripheral Pulses, No clubbing, cyanosis, or edema    LYMPH: No lymphadenopathy noted    SKIN: No rashes or lesions      **************************** LABS *******************************************************                          15.4   7.58  )-----------( 101      ( 18 Jan 2025 06:41 )             44.0     01-18    142  |  106  |  8[L]  ----------------------------<  78  4.5   |  23  |  0.9    Ca    8.3[L]      18 Jan 2025 06:41  Mg     1.8     01-18    TPro  5.6[L]  /  Alb  3.6  /  TBili  1.1  /  DBili  x   /  AST  23  /  ALT  <5  /  AlkPhos  68  01-18      Urinalysis Basic - ( 18 Jan 2025 06:41 )    Color: x / Appearance: x / SG: x / pH: x  Gluc: 78 mg/dL / Ketone: x  / Bili: x / Urobili: x   Blood: x / Protein: x / Nitrite: x   Leuk Esterase: x / RBC: x / WBC x   Sq Epi: x / Non Sq Epi: x / Bacteria: x        Lactate Trend        CAPILLARY BLOOD GLUCOSE      POCT Blood Glucose.: 150 mg/dL (18 Jan 2025 11:21)          **************************Active Medications *******************************************  sulfa drugs (Unknown)  cefaclor (Unknown)  penicillin (Unknown)      acetaminophen     Tablet .. 650 milliGRAM(s) Oral every 6 hours PRN  aspirin  chewable 81 milliGRAM(s) Oral daily  carbidopa/levodopa  25/100 2 Tablet(s) Oral three times a day  carbidopa/levodopa CR 50/200 1 Tablet(s) Oral <User Schedule>  carvedilol 6.25 milliGRAM(s) Oral every 12 hours  dextrose 5%. 1000 milliLiter(s) IV Continuous <Continuous>  dextrose 5%. 1000 milliLiter(s) IV Continuous <Continuous>  dextrose 50% Injectable 25 Gram(s) IV Push once  dextrose 50% Injectable 12.5 Gram(s) IV Push once  dextrose 50% Injectable 25 Gram(s) IV Push once  dextrose Oral Gel 15 Gram(s) Oral once PRN  enoxaparin Injectable 40 milliGRAM(s) SubCutaneous every 24 hours  entacapone 200 milliGRAM(s) Oral three times a day  gabapentin 100 milliGRAM(s) Oral at bedtime  glucagon  Injectable 1 milliGRAM(s) IntraMuscular once  insulin lispro (ADMELOG) corrective regimen sliding scale   SubCutaneous three times a day before meals  levETIRAcetam 500 milliGRAM(s) Oral two times a day  melatonin 3 milliGRAM(s) Oral at bedtime PRN  mirtazapine 7.5 milliGRAM(s) Oral daily  polyethylene glycol 3350 17 Gram(s) Oral daily  rivastigmine patch  9.5 mG/24 Hr(s) 1 Patch Transdermal every 24 hours  senna 2 Tablet(s) Oral at bedtime  traZODone 100 milliGRAM(s) Oral at bedtime      ***************************************************  RADIOLOGY & ADDITIONAL TESTS:    Imaging Personally Reviewed:  [ ] YES  [ ] NO    HEALTH ISSUES - PROBLEM Dx:  Weakness    Confusion    Dementia    Parkinson disease    Advance care planning    Palliative care by specialist

## 2025-01-19 LAB
ALBUMIN SERPL ELPH-MCNC: 3.6 G/DL — SIGNIFICANT CHANGE UP (ref 3.5–5.2)
ALP SERPL-CCNC: 74 U/L — SIGNIFICANT CHANGE UP (ref 30–115)
ALT FLD-CCNC: <5 U/L — SIGNIFICANT CHANGE UP (ref 0–41)
ANION GAP SERPL CALC-SCNC: 13 MMOL/L — SIGNIFICANT CHANGE UP (ref 7–14)
AST SERPL-CCNC: 16 U/L — SIGNIFICANT CHANGE UP (ref 0–41)
BASOPHILS # BLD AUTO: 0.02 K/UL — SIGNIFICANT CHANGE UP (ref 0–0.2)
BASOPHILS NFR BLD AUTO: 0.3 % — SIGNIFICANT CHANGE UP (ref 0–1)
BILIRUB SERPL-MCNC: 0.8 MG/DL — SIGNIFICANT CHANGE UP (ref 0.2–1.2)
BUN SERPL-MCNC: 13 MG/DL — SIGNIFICANT CHANGE UP (ref 10–20)
CALCIUM SERPL-MCNC: 8.7 MG/DL — SIGNIFICANT CHANGE UP (ref 8.4–10.5)
CHLORIDE SERPL-SCNC: 107 MMOL/L — SIGNIFICANT CHANGE UP (ref 98–110)
CO2 SERPL-SCNC: 24 MMOL/L — SIGNIFICANT CHANGE UP (ref 17–32)
CREAT SERPL-MCNC: 1 MG/DL — SIGNIFICANT CHANGE UP (ref 0.7–1.5)
EGFR: 80 ML/MIN/1.73M2 — SIGNIFICANT CHANGE UP
EOSINOPHIL # BLD AUTO: 0.12 K/UL — SIGNIFICANT CHANGE UP (ref 0–0.7)
EOSINOPHIL NFR BLD AUTO: 1.5 % — SIGNIFICANT CHANGE UP (ref 0–8)
GLUCOSE BLDC GLUCOMTR-MCNC: 100 MG/DL — HIGH (ref 70–99)
GLUCOSE BLDC GLUCOMTR-MCNC: 113 MG/DL — HIGH (ref 70–99)
GLUCOSE BLDC GLUCOMTR-MCNC: 86 MG/DL — SIGNIFICANT CHANGE UP (ref 70–99)
GLUCOSE BLDC GLUCOMTR-MCNC: 98 MG/DL — SIGNIFICANT CHANGE UP (ref 70–99)
GLUCOSE SERPL-MCNC: 85 MG/DL — SIGNIFICANT CHANGE UP (ref 70–99)
HCT VFR BLD CALC: 42.4 % — SIGNIFICANT CHANGE UP (ref 42–52)
HGB BLD-MCNC: 14.6 G/DL — SIGNIFICANT CHANGE UP (ref 14–18)
IMM GRANULOCYTES NFR BLD AUTO: 0.1 % — SIGNIFICANT CHANGE UP (ref 0.1–0.3)
LYMPHOCYTES # BLD AUTO: 2.23 K/UL — SIGNIFICANT CHANGE UP (ref 1.2–3.4)
LYMPHOCYTES # BLD AUTO: 28.1 % — SIGNIFICANT CHANGE UP (ref 20.5–51.1)
MAGNESIUM SERPL-MCNC: 1.8 MG/DL — SIGNIFICANT CHANGE UP (ref 1.8–2.4)
MCHC RBC-ENTMCNC: 34.1 PG — HIGH (ref 27–31)
MCHC RBC-ENTMCNC: 34.4 G/DL — SIGNIFICANT CHANGE UP (ref 32–37)
MCV RBC AUTO: 99.1 FL — HIGH (ref 80–94)
MONOCYTES # BLD AUTO: 0.71 K/UL — HIGH (ref 0.1–0.6)
MONOCYTES NFR BLD AUTO: 8.9 % — SIGNIFICANT CHANGE UP (ref 1.7–9.3)
NEUTROPHILS # BLD AUTO: 4.85 K/UL — SIGNIFICANT CHANGE UP (ref 1.4–6.5)
NEUTROPHILS NFR BLD AUTO: 61.1 % — SIGNIFICANT CHANGE UP (ref 42.2–75.2)
NRBC # BLD: 0 /100 WBCS — SIGNIFICANT CHANGE UP (ref 0–0)
NRBC BLD-RTO: 0 /100 WBCS — SIGNIFICANT CHANGE UP (ref 0–0)
PLATELET # BLD AUTO: 121 K/UL — LOW (ref 130–400)
PMV BLD: 9.7 FL — SIGNIFICANT CHANGE UP (ref 7.4–10.4)
POTASSIUM SERPL-MCNC: 3.7 MMOL/L — SIGNIFICANT CHANGE UP (ref 3.5–5)
POTASSIUM SERPL-SCNC: 3.7 MMOL/L — SIGNIFICANT CHANGE UP (ref 3.5–5)
PROT SERPL-MCNC: 5.6 G/DL — LOW (ref 6–8)
RBC # BLD: 4.28 M/UL — LOW (ref 4.7–6.1)
RBC # FLD: 11.7 % — SIGNIFICANT CHANGE UP (ref 11.5–14.5)
SODIUM SERPL-SCNC: 144 MMOL/L — SIGNIFICANT CHANGE UP (ref 135–146)
WBC # BLD: 7.94 K/UL — SIGNIFICANT CHANGE UP (ref 4.8–10.8)
WBC # FLD AUTO: 7.94 K/UL — SIGNIFICANT CHANGE UP (ref 4.8–10.8)

## 2025-01-19 PROCEDURE — 99232 SBSQ HOSP IP/OBS MODERATE 35: CPT

## 2025-01-19 RX ADMIN — GABAPENTIN 100 MILLIGRAM(S): 800 TABLET ORAL at 21:30

## 2025-01-19 RX ADMIN — ASPIRIN 81 MILLIGRAM(S): 81 TABLET, COATED ORAL at 11:14

## 2025-01-19 RX ADMIN — Medication 1 TABLET(S): at 17:27

## 2025-01-19 RX ADMIN — Medication 100 MILLIGRAM(S): at 21:29

## 2025-01-19 RX ADMIN — ENTACAPONE 200 MILLIGRAM(S): 200 TABLET, FILM COATED ORAL at 21:28

## 2025-01-19 RX ADMIN — ENTACAPONE 200 MILLIGRAM(S): 200 TABLET, FILM COATED ORAL at 13:06

## 2025-01-19 RX ADMIN — Medication 6.25 MILLIGRAM(S): at 05:07

## 2025-01-19 RX ADMIN — RIVASTIGMINE 1 PATCH: 4.6 PATCH, EXTENDED RELEASE TRANSDERMAL at 18:03

## 2025-01-19 RX ADMIN — ENTACAPONE 200 MILLIGRAM(S): 200 TABLET, FILM COATED ORAL at 05:07

## 2025-01-19 RX ADMIN — RIVASTIGMINE 1 PATCH: 4.6 PATCH, EXTENDED RELEASE TRANSDERMAL at 07:18

## 2025-01-19 RX ADMIN — Medication 6.25 MILLIGRAM(S): at 17:26

## 2025-01-19 RX ADMIN — ENOXAPARIN SODIUM 40 MILLIGRAM(S): 100 INJECTION SUBCUTANEOUS at 05:07

## 2025-01-19 RX ADMIN — MIRTAZAPINE 7.5 MILLIGRAM(S): 30 TABLET, FILM COATED ORAL at 11:14

## 2025-01-19 RX ADMIN — LEVETIRACETAM 500 MILLIGRAM(S): 750 TABLET, FILM COATED ORAL at 17:26

## 2025-01-19 RX ADMIN — Medication 2 TABLET(S): at 21:28

## 2025-01-19 RX ADMIN — LEVETIRACETAM 500 MILLIGRAM(S): 750 TABLET, FILM COATED ORAL at 05:07

## 2025-01-19 RX ADMIN — Medication 2 TABLET(S): at 13:06

## 2025-01-19 RX ADMIN — POLYETHYLENE GLYCOL 3350 17 GRAM(S): 17 POWDER, FOR SOLUTION ORAL at 11:14

## 2025-01-19 RX ADMIN — RIVASTIGMINE 1 PATCH: 4.6 PATCH, EXTENDED RELEASE TRANSDERMAL at 05:47

## 2025-01-19 RX ADMIN — Medication 2 TABLET(S): at 05:07

## 2025-01-19 RX ADMIN — RIVASTIGMINE 1 PATCH: 4.6 PATCH, EXTENDED RELEASE TRANSDERMAL at 05:01

## 2025-01-19 NOTE — PROGRESS NOTE ADULT - SUBJECTIVE AND OBJECTIVE BOX
ZAKIA MILLS  74y  Male      Patient is a 74y old  Male who presents with a chief complaint of Recurrent witnessed falls, AMS.    INTERVAL HPI/OVERNIGHT EVENTS:      ******************************* REVIEW OF SYSTEMS:*********************************************  feeling better   All other review of systems negative    *********************** VITALS ******************************************    T(F): 98 (01-19-25 @ 12:15)  HR: 74 (01-19-25 @ 12:15) (59 - 75)  BP: 162/96 (01-19-25 @ 12:15) (155/89 - 177/98)  RR: 18 (01-19-25 @ 12:15) (18 - 19)  SpO2: 97% (01-19-25 @ 12:15) (97% - 97%)            ******************************** PHYSICAL EXAM:**************************************************  GENERAL: NAD    PSYCH: no agitation, baseline mentation  HEENT:     NERVOUS SYSTEM:  Alert & Oriented X3,   PULMONARY: LUCIE, CTA    CARDIOVASCULAR: S1S2 RRR    GI: Soft, NT, ND; BS present.    EXTREMITIES:  2+ Peripheral Pulses, No clubbing, cyanosis, or edema    LYMPH: No lymphadenopathy noted    SKIN: No rashes or lesions      **************************** LABS *******************************************************                          14.6   7.94  )-----------( 121      ( 19 Jan 2025 06:28 )             42.4     01-19    144  |  107  |  13  ----------------------------<  85  3.7   |  24  |  1.0    Ca    8.7      19 Jan 2025 06:28  Mg     1.8     01-19    TPro  5.6[L]  /  Alb  3.6  /  TBili  0.8  /  DBili  x   /  AST  16  /  ALT  <5  /  AlkPhos  74  01-19      Urinalysis Basic - ( 19 Jan 2025 06:28 )    Color: x / Appearance: x / SG: x / pH: x  Gluc: 85 mg/dL / Ketone: x  / Bili: x / Urobili: x   Blood: x / Protein: x / Nitrite: x   Leuk Esterase: x / RBC: x / WBC x   Sq Epi: x / Non Sq Epi: x / Bacteria: x        Lactate Trend        CAPILLARY BLOOD GLUCOSE      POCT Blood Glucose.: 113 mg/dL (19 Jan 2025 12:19)          **************************Active Medications *******************************************  sulfa drugs (Unknown)  cefaclor (Unknown)  penicillin (Unknown)      acetaminophen     Tablet .. 650 milliGRAM(s) Oral every 6 hours PRN  aspirin  chewable 81 milliGRAM(s) Oral daily  carbidopa/levodopa  25/100 2 Tablet(s) Oral three times a day  carbidopa/levodopa CR 50/200 1 Tablet(s) Oral <User Schedule>  carvedilol 6.25 milliGRAM(s) Oral every 12 hours  dextrose 5%. 1000 milliLiter(s) IV Continuous <Continuous>  dextrose 5%. 1000 milliLiter(s) IV Continuous <Continuous>  dextrose 50% Injectable 25 Gram(s) IV Push once  dextrose 50% Injectable 12.5 Gram(s) IV Push once  dextrose 50% Injectable 25 Gram(s) IV Push once  dextrose Oral Gel 15 Gram(s) Oral once PRN  enoxaparin Injectable 40 milliGRAM(s) SubCutaneous every 24 hours  entacapone 200 milliGRAM(s) Oral three times a day  gabapentin 100 milliGRAM(s) Oral at bedtime  glucagon  Injectable 1 milliGRAM(s) IntraMuscular once  insulin lispro (ADMELOG) corrective regimen sliding scale   SubCutaneous three times a day before meals  levETIRAcetam 500 milliGRAM(s) Oral two times a day  melatonin 3 milliGRAM(s) Oral at bedtime PRN  mirtazapine 7.5 milliGRAM(s) Oral daily  polyethylene glycol 3350 17 Gram(s) Oral daily  rivastigmine patch  9.5 mG/24 Hr(s) 1 Patch Transdermal every 24 hours  senna 2 Tablet(s) Oral at bedtime  traZODone 100 milliGRAM(s) Oral at bedtime      ***************************************************  RADIOLOGY & ADDITIONAL TESTS:    Imaging Personally Reviewed:  [ ] YES  [ ] NO    HEALTH ISSUES - PROBLEM Dx:  Weakness    Confusion    Dementia    Parkinson disease    Advance care planning    Palliative care by specialist

## 2025-01-19 NOTE — PROGRESS NOTE ADULT - ASSESSMENT
74 year old Male, PMH of Parkinson's, HTN, hx ischemic CVA x2, type 2 DM, dementia and chronic back pain, Hx of seizures on keppra, BIBA from [place] facility  or generalized weakness, confusion, decreased PO intake and multiple witnessed falls over the past few days. He had two witnessed falls the day prior to admission where he was weak and slumped to the ground. No LOC, not on AC. Per staff, he is normally alert and oriented x 3 and able to perform ADLs, but for the past few days has been increasingly confused. He was evaluated in the ED the day prior for similar complaints, had a negative work up and was discharged.  The Patient is A+Ox2-3 on interview, poor historian, cannot recollect the events that occurred, does endorse pain around the left eye.   Denies HA, f/c, CP, SOB, abdominal pain, dysuria, n/v/diarrhea, blood in stool or urine  Tried reaching [facility] for further information with no answer.     1. Generalized weakness associated with Recurrent Falls    in a pt with parkinson's disease and dementia           -TSH/ VIt D >> wnl  - Trauma w/up: CTH (1/15) neg, CXR negative, xray pelvis no acute fracture  - Infectious workup: SIRS negative, afebrile, no elevated WBC, CXR wnl, UA negative - trop 11-->9  - Orthostatic  > Neg     - Fall precautions, frequent orientation   - c/w home meds: carbidopa/levodopa, entacapone, rivastigmine patch (submitted nonformulary- f/u if accepted)  - Palliative care consult appreciated     2. Hx of CVA  - MRI 1/21/23: There is no evidence of acute intracranial pathology. No evidence of acute infarct, intracranial hemorrhage or mass effect. Moderate chronic microvascular type changes as well as chronic lacunar infarcts as detailed.  - Not on statin? unclear why  - C/w aspirin     3. Hx of seizures  - C/w Keppra    4.  HTN  - not currently on any home meds  - Monitor     5.  DM II ??   - Hgba1c 4.9     -FS, ISS    6. Hx of constipation   - Cont laxative       #DVT prophylaxis: Lovenox 40mg   #GI prophylaxis: not needed  #Diet: cc  #Activity: increase as tolerated    Pending: Placement    Dispo: 4A vs STR

## 2025-01-20 LAB
ALBUMIN SERPL ELPH-MCNC: 3.7 G/DL — SIGNIFICANT CHANGE UP (ref 3.5–5.2)
ALP SERPL-CCNC: 78 U/L — SIGNIFICANT CHANGE UP (ref 30–115)
ALT FLD-CCNC: <5 U/L — SIGNIFICANT CHANGE UP (ref 0–41)
ANION GAP SERPL CALC-SCNC: 9 MMOL/L — SIGNIFICANT CHANGE UP (ref 7–14)
AST SERPL-CCNC: 14 U/L — SIGNIFICANT CHANGE UP (ref 0–41)
BASOPHILS # BLD AUTO: 0.02 K/UL — SIGNIFICANT CHANGE UP (ref 0–0.2)
BASOPHILS NFR BLD AUTO: 0.2 % — SIGNIFICANT CHANGE UP (ref 0–1)
BILIRUB SERPL-MCNC: 1 MG/DL — SIGNIFICANT CHANGE UP (ref 0.2–1.2)
BUN SERPL-MCNC: 11 MG/DL — SIGNIFICANT CHANGE UP (ref 10–20)
CALCIUM SERPL-MCNC: 9 MG/DL — SIGNIFICANT CHANGE UP (ref 8.4–10.5)
CHLORIDE SERPL-SCNC: 108 MMOL/L — SIGNIFICANT CHANGE UP (ref 98–110)
CO2 SERPL-SCNC: 27 MMOL/L — SIGNIFICANT CHANGE UP (ref 17–32)
CREAT SERPL-MCNC: 0.9 MG/DL — SIGNIFICANT CHANGE UP (ref 0.7–1.5)
EGFR: 91 ML/MIN/1.73M2 — SIGNIFICANT CHANGE UP
EOSINOPHIL # BLD AUTO: 0.1 K/UL — SIGNIFICANT CHANGE UP (ref 0–0.7)
EOSINOPHIL NFR BLD AUTO: 1.2 % — SIGNIFICANT CHANGE UP (ref 0–8)
GLUCOSE BLDC GLUCOMTR-MCNC: 136 MG/DL — HIGH (ref 70–99)
GLUCOSE BLDC GLUCOMTR-MCNC: 142 MG/DL — HIGH (ref 70–99)
GLUCOSE BLDC GLUCOMTR-MCNC: 168 MG/DL — HIGH (ref 70–99)
GLUCOSE BLDC GLUCOMTR-MCNC: 77 MG/DL — SIGNIFICANT CHANGE UP (ref 70–99)
GLUCOSE SERPL-MCNC: 68 MG/DL — LOW (ref 70–99)
HCT VFR BLD CALC: 45 % — SIGNIFICANT CHANGE UP (ref 42–52)
HGB BLD-MCNC: 15.4 G/DL — SIGNIFICANT CHANGE UP (ref 14–18)
IMM GRANULOCYTES NFR BLD AUTO: 0.2 % — SIGNIFICANT CHANGE UP (ref 0.1–0.3)
LYMPHOCYTES # BLD AUTO: 2.09 K/UL — SIGNIFICANT CHANGE UP (ref 1.2–3.4)
LYMPHOCYTES # BLD AUTO: 26 % — SIGNIFICANT CHANGE UP (ref 20.5–51.1)
MAGNESIUM SERPL-MCNC: 1.8 MG/DL — SIGNIFICANT CHANGE UP (ref 1.8–2.4)
MCHC RBC-ENTMCNC: 34 PG — HIGH (ref 27–31)
MCHC RBC-ENTMCNC: 34.2 G/DL — SIGNIFICANT CHANGE UP (ref 32–37)
MCV RBC AUTO: 99.3 FL — HIGH (ref 80–94)
MONOCYTES # BLD AUTO: 0.74 K/UL — HIGH (ref 0.1–0.6)
MONOCYTES NFR BLD AUTO: 9.2 % — SIGNIFICANT CHANGE UP (ref 1.7–9.3)
NEUTROPHILS # BLD AUTO: 5.06 K/UL — SIGNIFICANT CHANGE UP (ref 1.4–6.5)
NEUTROPHILS NFR BLD AUTO: 63.2 % — SIGNIFICANT CHANGE UP (ref 42.2–75.2)
NRBC # BLD: 0 /100 WBCS — SIGNIFICANT CHANGE UP (ref 0–0)
NRBC BLD-RTO: 0 /100 WBCS — SIGNIFICANT CHANGE UP (ref 0–0)
PLATELET # BLD AUTO: 122 K/UL — LOW (ref 130–400)
PMV BLD: 10.3 FL — SIGNIFICANT CHANGE UP (ref 7.4–10.4)
POTASSIUM SERPL-MCNC: 4.7 MMOL/L — SIGNIFICANT CHANGE UP (ref 3.5–5)
POTASSIUM SERPL-SCNC: 4.7 MMOL/L — SIGNIFICANT CHANGE UP (ref 3.5–5)
PROT SERPL-MCNC: 5.8 G/DL — LOW (ref 6–8)
RBC # BLD: 4.53 M/UL — LOW (ref 4.7–6.1)
RBC # FLD: 11.5 % — SIGNIFICANT CHANGE UP (ref 11.5–14.5)
SODIUM SERPL-SCNC: 144 MMOL/L — SIGNIFICANT CHANGE UP (ref 135–146)
WBC # BLD: 8.03 K/UL — SIGNIFICANT CHANGE UP (ref 4.8–10.8)
WBC # FLD AUTO: 8.03 K/UL — SIGNIFICANT CHANGE UP (ref 4.8–10.8)

## 2025-01-20 PROCEDURE — 99232 SBSQ HOSP IP/OBS MODERATE 35: CPT

## 2025-01-20 RX ORDER — AMLODIPINE BESYLATE 5 MG
5 TABLET ORAL ONCE
Refills: 0 | Status: COMPLETED | OUTPATIENT
Start: 2025-01-20 | End: 2025-01-20

## 2025-01-20 RX ORDER — ANTISEPTIC SURGICAL SCRUB 0.04 MG/ML
1 SOLUTION TOPICAL
Refills: 0 | Status: DISCONTINUED | OUTPATIENT
Start: 2025-01-20 | End: 2025-01-24

## 2025-01-20 RX ORDER — CARVEDILOL 6.25 MG
3.12 TABLET ORAL EVERY 12 HOURS
Refills: 0 | Status: DISCONTINUED | OUTPATIENT
Start: 2025-01-20 | End: 2025-01-24

## 2025-01-20 RX ORDER — AMLODIPINE BESYLATE 5 MG
5 TABLET ORAL DAILY
Refills: 0 | Status: DISCONTINUED | OUTPATIENT
Start: 2025-01-21 | End: 2025-01-24

## 2025-01-20 RX ADMIN — LEVETIRACETAM 500 MILLIGRAM(S): 750 TABLET, FILM COATED ORAL at 17:33

## 2025-01-20 RX ADMIN — ANTISEPTIC SURGICAL SCRUB 1 APPLICATION(S): 0.04 SOLUTION TOPICAL at 11:29

## 2025-01-20 RX ADMIN — ASPIRIN 81 MILLIGRAM(S): 81 TABLET, COATED ORAL at 11:28

## 2025-01-20 RX ADMIN — GABAPENTIN 100 MILLIGRAM(S): 800 TABLET ORAL at 21:15

## 2025-01-20 RX ADMIN — Medication 3.12 MILLIGRAM(S): at 17:33

## 2025-01-20 RX ADMIN — MIRTAZAPINE 7.5 MILLIGRAM(S): 30 TABLET, FILM COATED ORAL at 11:28

## 2025-01-20 RX ADMIN — ENTACAPONE 200 MILLIGRAM(S): 200 TABLET, FILM COATED ORAL at 21:13

## 2025-01-20 RX ADMIN — Medication 5 MILLIGRAM(S): at 11:28

## 2025-01-20 RX ADMIN — LEVETIRACETAM 500 MILLIGRAM(S): 750 TABLET, FILM COATED ORAL at 06:03

## 2025-01-20 RX ADMIN — Medication 6.25 MILLIGRAM(S): at 06:04

## 2025-01-20 RX ADMIN — RIVASTIGMINE 1 PATCH: 4.6 PATCH, EXTENDED RELEASE TRANSDERMAL at 06:00

## 2025-01-20 RX ADMIN — ENTACAPONE 200 MILLIGRAM(S): 200 TABLET, FILM COATED ORAL at 06:03

## 2025-01-20 RX ADMIN — ENOXAPARIN SODIUM 40 MILLIGRAM(S): 100 INJECTION SUBCUTANEOUS at 06:03

## 2025-01-20 RX ADMIN — ENTACAPONE 200 MILLIGRAM(S): 200 TABLET, FILM COATED ORAL at 13:04

## 2025-01-20 RX ADMIN — Medication 2 TABLET(S): at 21:15

## 2025-01-20 RX ADMIN — Medication 1 TABLET(S): at 17:33

## 2025-01-20 RX ADMIN — RIVASTIGMINE 1 PATCH: 4.6 PATCH, EXTENDED RELEASE TRANSDERMAL at 06:09

## 2025-01-20 RX ADMIN — Medication 2 TABLET(S): at 13:04

## 2025-01-20 RX ADMIN — Medication 2 TABLET(S): at 06:04

## 2025-01-20 RX ADMIN — RIVASTIGMINE 1 PATCH: 4.6 PATCH, EXTENDED RELEASE TRANSDERMAL at 19:33

## 2025-01-20 RX ADMIN — RIVASTIGMINE 1 PATCH: 4.6 PATCH, EXTENDED RELEASE TRANSDERMAL at 07:45

## 2025-01-20 RX ADMIN — Medication 100 MILLIGRAM(S): at 21:15

## 2025-01-20 RX ADMIN — POLYETHYLENE GLYCOL 3350 17 GRAM(S): 17 POWDER, FOR SOLUTION ORAL at 11:28

## 2025-01-20 NOTE — PROGRESS NOTE ADULT - ASSESSMENT
74 year old Male, PMH of Parkinson's, HTN, hx ischemic CVA x2, type 2 DM, dementia and chronic back pain, Hx of seizures on keppra, BIBA from [place] facility  or generalized weakness, confusion, decreased PO intake and multiple witnessed falls over the past few days.    #Recurrent falls associated w/ confusion weakness  #Hx parkinson's disease   #Hx dementia  #Hx ischemic CVA x2  #Hx seizure disorder   - Trauma w/u negative: CTH, CXR, Xray pelvis   - Infectious w/u negative: afebrile, no leukocytosis, UA(-), trop 11> 9  - orthostatics: negative   - TSH, vit D wnl  - c/w home meds: carbidopa/levodopa, entacapone, rivastigmine patch (submitted nonformulary- f/u if accepted)  - c/w ASA 81mg qd    -c/w keppra 500mg bid  - c/w gabapentin 100mg qhs   - c/w mirtazapine 7.5mg qd   - c/w trazodone 100mg qhs  - not on statin (?)  - Palliative care consult appreciated     #HTN  #DM2  - c/w coreg 6.25 mg BID   - A1C 4.9  - Monitor   - FS TIDAC qhs, dc ISS       ---------------------  DVT ppx: lovenox  Diet: CCC w/ evening snack  GI ppx/Bowel regimen: miralax, senna   Activity: IAT  GOC: full  Dispo: 4A vs CHARIS  #Summary/Handoff:  - dispo planning 74 year old Male, PMH of Parkinson's, HTN, hx ischemic CVA x2, type 2 DM, dementia and chronic back pain, Hx of seizures on keppra, BIBA from [place] facility  or generalized weakness, confusion, decreased PO intake and multiple witnessed falls over the past few days.    #Recurrent falls associated w/ confusion weakness  #Hx parkinson's disease   #Hx dementia  #Hx ischemic CVA x2  #Hx seizure disorder   - Trauma w/u negative: CTH, CXR, Xray pelvis   - Infectious w/u negative: afebrile, no leukocytosis, UA(-), trop 11> 9  - orthostatics: negative   - TSH, vit D wnl  - c/w home meds: carbidopa/levodopa, entacapone, rivastigmine patch (submitted nonformulary- f/u if accepted)  - c/w ASA 81mg qd    -c/w keppra 500mg bid  - c/w gabapentin 100mg qhs   - c/w mirtazapine 7.5mg qd   - c/w trazodone 100mg qhs  - not on statin (?)  - Palliative care consult appreciated     #HTN  #DM2  - c/w coreg 6.25 mg BID   - start amlodipine 5mg qd for now  - A1C 4.9  - Monitor   - FS TIDAC qhs, dc ISS       ---------------------  DVT ppx: lovenox  Diet: CCC w/ evening snack  GI ppx/Bowel regimen: miralax, senna   Activity: IAT  GOC: full  Dispo: 4A vs CHARIS  #Summary/Handoff:  - dispo planning

## 2025-01-20 NOTE — PROGRESS NOTE ADULT - SUBJECTIVE AND OBJECTIVE BOX
Patient is a 74y old Male who presents with a chief complaint of Recurrent witnessed falls, AMS (19 Jan 2025 14:22)    Today is hospital day     Overnight events/Interval History:  - No acute overnight events  - At bedside, patient feels well, limited history due to mental status, denies new concerns.     ROS:  - limited 2/2 mental status    Code Status: full code    ALLERGIES:  sulfa drugs (Unknown)  cefaclor (Unknown)  penicillin (Unknown)    MEDICATIONS:  STANDING MEDICATIONS  aspirin  chewable 81 milliGRAM(s) Oral daily  carbidopa/levodopa  25/100 2 Tablet(s) Oral three times a day  carbidopa/levodopa CR 50/200 1 Tablet(s) Oral <User Schedule>  carvedilol 6.25 milliGRAM(s) Oral every 12 hours  dextrose 5%. 1000 milliLiter(s) IV Continuous <Continuous>  dextrose 5%. 1000 milliLiter(s) IV Continuous <Continuous>  dextrose 50% Injectable 25 Gram(s) IV Push once  dextrose 50% Injectable 12.5 Gram(s) IV Push once  dextrose 50% Injectable 25 Gram(s) IV Push once  enoxaparin Injectable 40 milliGRAM(s) SubCutaneous every 24 hours  entacapone 200 milliGRAM(s) Oral three times a day  gabapentin 100 milliGRAM(s) Oral at bedtime  glucagon  Injectable 1 milliGRAM(s) IntraMuscular once  levETIRAcetam 500 milliGRAM(s) Oral two times a day  mirtazapine 7.5 milliGRAM(s) Oral daily  polyethylene glycol 3350 17 Gram(s) Oral daily  rivastigmine patch  9.5 mG/24 Hr(s) 1 Patch Transdermal every 24 hours  senna 2 Tablet(s) Oral at bedtime  traZODone 100 milliGRAM(s) Oral at bedtime    PRN MEDICATIONS  acetaminophen     Tablet .. 650 milliGRAM(s) Oral every 6 hours PRN  dextrose Oral Gel 15 Gram(s) Oral once PRN  melatonin 3 milliGRAM(s) Oral at bedtime PRN      VITALS LAST 24H:  Vital Signs Last 24 Hrs  T(C): 36.5 (19 Jan 2025 20:05), Max: 36.7 (19 Jan 2025 12:15)  T(F): 97.7 (19 Jan 2025 20:05), Max: 98 (19 Jan 2025 12:15)  HR: 60 (20 Jan 2025 04:46) (54 - 74)  BP: 179/90 (20 Jan 2025 04:46) (162/96 - 184/100)  BP(mean): --  RR: 18 (19 Jan 2025 20:05) (18 - 18)  SpO2: 97% (19 Jan 2025 20:05) (97% - 97%)      PHYSICAL EXAM:  GENERAL: NAD, lying in bed comfortably  HEENT: Moist mucous membranes  CHEST/LUNG: Clear to auscultation bilaterally; normal respiratory effort  HEART: Regular rate and rhythm  ABDOMEN: Soft, nontender, nondistended  EXTREMITIES:  No lower extremity edema bilaterally  NERVOUS SYSTEM:  A&Ox2    LABS:                        15.4   8.03  )-----------( 122      ( 20 Jan 2025 05:16 )             45.0     01-20    144  |  108  |  11  ----------------------------<  68[L]  4.7   |  27  |  0.9    Ca    9.0      20 Jan 2025 05:16  Mg     1.8     01-20    TPro  5.8[L]  /  Alb  3.7  /  TBili  1.0  /  DBili  x   /  AST  14  /  ALT  <5  /  AlkPhos  78  01-20      Urinalysis Basic - ( 20 Jan 2025 05:16 )    Color: x / Appearance: x / SG: x / pH: x  Gluc: 68 mg/dL / Ketone: x  / Bili: x / Urobili: x   Blood: x / Protein: x / Nitrite: x   Leuk Esterase: x / RBC: x / WBC x   Sq Epi: x / Non Sq Epi: x / Bacteria: x                RADIOLOGY:  CXR      CT

## 2025-01-20 NOTE — PROGRESS NOTE ADULT - SUBJECTIVE AND OBJECTIVE BOX
ZAKIA MILLS  74y  Male      Patient is a 74y old  Male who presents with a chief complaint of Recurrent witnessed falls, AMS.      INTERVAL HPI/OVERNIGHT EVENTS:      ******************************* REVIEW OF SYSTEMS:**********************************************    All other review of systems negative    *********************** VITALS ******************************************    T(F): 98 (01-20-25 @ 12:53)  HR: 79 (01-20-25 @ 12:53) (54 - 79)  BP: 161/84 (01-20-25 @ 12:53) (161/84 - 184/100)  RR: 18 (01-20-25 @ 12:53) (18 - 18)  SpO2: 95% (01-20-25 @ 12:53) (95% - 97%)            ******************************** PHYSICAL EXAM:**************************************************  GENERAL: NAD    PSYCH: no agitation, baseline mentation  HEENT:     NERVOUS SYSTEM:  Alert & awake x 2, poor speech     PULMONARY: LUCIE, CTA    CARDIOVASCULAR: S1S2 RRR    GI: Soft, NT, ND; BS present.    EXTREMITIES:  2+ Peripheral Pulses, No clubbing, cyanosis, or edema    LYMPH: No lymphadenopathy noted    SKIN: No rashes or lesions      **************************** LABS *******************************************************                          15.4   8.03  )-----------( 122      ( 20 Jan 2025 05:16 )             45.0     01-20    144  |  108  |  11  ----------------------------<  68[L]  4.7   |  27  |  0.9    Ca    9.0      20 Jan 2025 05:16  Mg     1.8     01-20    TPro  5.8[L]  /  Alb  3.7  /  TBili  1.0  /  DBili  x   /  AST  14  /  ALT  <5  /  AlkPhos  78  01-20      Urinalysis Basic - ( 20 Jan 2025 05:16 )    Color: x / Appearance: x / SG: x / pH: x  Gluc: 68 mg/dL / Ketone: x  / Bili: x / Urobili: x   Blood: x / Protein: x / Nitrite: x   Leuk Esterase: x / RBC: x / WBC x   Sq Epi: x / Non Sq Epi: x / Bacteria: x        Lactate Trend        CAPILLARY BLOOD GLUCOSE      POCT Blood Glucose.: 142 mg/dL (20 Jan 2025 11:40)          **************************Active Medications *******************************************  sulfa drugs (Unknown)  cefaclor (Unknown)  penicillin (Unknown)      acetaminophen     Tablet .. 650 milliGRAM(s) Oral every 6 hours PRN  aspirin  chewable 81 milliGRAM(s) Oral daily  carbidopa/levodopa  25/100 2 Tablet(s) Oral three times a day  carbidopa/levodopa CR 50/200 1 Tablet(s) Oral <User Schedule>  carvedilol 3.125 milliGRAM(s) Oral every 12 hours  chlorhexidine 2% Cloths 1 Application(s) Topical <User Schedule>  dextrose 5%. 1000 milliLiter(s) IV Continuous <Continuous>  dextrose 5%. 1000 milliLiter(s) IV Continuous <Continuous>  dextrose 50% Injectable 25 Gram(s) IV Push once  dextrose 50% Injectable 12.5 Gram(s) IV Push once  dextrose 50% Injectable 25 Gram(s) IV Push once  dextrose Oral Gel 15 Gram(s) Oral once PRN  enoxaparin Injectable 40 milliGRAM(s) SubCutaneous every 24 hours  entacapone 200 milliGRAM(s) Oral three times a day  gabapentin 100 milliGRAM(s) Oral at bedtime  glucagon  Injectable 1 milliGRAM(s) IntraMuscular once  levETIRAcetam 500 milliGRAM(s) Oral two times a day  melatonin 3 milliGRAM(s) Oral at bedtime PRN  mirtazapine 7.5 milliGRAM(s) Oral daily  polyethylene glycol 3350 17 Gram(s) Oral daily  rivastigmine patch  9.5 mG/24 Hr(s) 1 Patch Transdermal every 24 hours  senna 2 Tablet(s) Oral at bedtime  traZODone 100 milliGRAM(s) Oral at bedtime      ***************************************************  RADIOLOGY & ADDITIONAL TESTS:    Imaging Personally Reviewed:  [ ] YES  [ ] NO    HEALTH ISSUES - PROBLEM Dx:  Weakness    Confusion    Dementia    Parkinson disease    Advance care planning    Palliative care by specialist

## 2025-01-20 NOTE — PROGRESS NOTE ADULT - ASSESSMENT
74 year old Male, PMH of Parkinson's, HTN, hx ischemic CVA x2, type 2 DM, dementia and chronic back pain, Hx of seizures on keppra, BIBA from [place] facility  or generalized weakness, confusion, decreased PO intake and multiple witnessed falls over the past few days. He had two witnessed falls the day prior to admission where he was weak and slumped to the ground. No LOC, not on AC. Per staff, he is normally alert and oriented x 3 and able to perform ADLs, but for the past few days has been increasingly confused. He was evaluated in the ED the day prior for similar complaints, had a negative work up and was discharged.  The Patient is A+Ox2-3 on interview, poor historian, cannot recollect the events that occurred, does endorse pain around the left eye.   Denies HA, f/c, CP, SOB, abdominal pain, dysuria, n/v/diarrhea, blood in stool or urine  Tried reaching [facility] for further information with no answer.     1. Generalized weakness associated with Recurrent Falls    in a pt with parkinson's disease and dementia           -TSH/ VIt D >> wnl  - Trauma w/up: CTH (1/15) neg, CXR negative, xray pelvis no acute fracture  - Infectious workup neg   - Orthostatic  > Neg     - Fall precautions, frequent orientation   - c/w home meds: carbidopa/levodopa, entacapone, rivastigmine patch  - Palliative care consult appreciated     2. Hx of CVA  - MRI 1/21/23: There is no evidence of acute intracranial pathology. No evidence of acute infarct, intracranial hemorrhage or mass effect. Moderate chronic microvascular type changes as well as chronic lacunar infarcts as detailed.  - Not on statin? unclear why  - C/w aspirin     3. Hx of seizures  - C/w Keppra    4.  HTN  - not currently on any home meds  - Monitor     5.  DM II ??   - Hgba1c 4.9     6. Hx of constipation   - Cont laxative       #DVT prophylaxis: Lovenox 40mg   #GI prophylaxis: not needed  #Diet: cc  #Activity: increase as tolerated    Pending: Placement    Dispo: 4A vs STR

## 2025-01-21 ENCOUNTER — RESULT REVIEW (OUTPATIENT)
Age: 75
End: 2025-01-21

## 2025-01-21 LAB
GLUCOSE BLDC GLUCOMTR-MCNC: 126 MG/DL — HIGH (ref 70–99)
GLUCOSE BLDC GLUCOMTR-MCNC: 129 MG/DL — HIGH (ref 70–99)
GLUCOSE BLDC GLUCOMTR-MCNC: 212 MG/DL — HIGH (ref 70–99)
GLUCOSE BLDC GLUCOMTR-MCNC: 78 MG/DL — SIGNIFICANT CHANGE UP (ref 70–99)
GLUCOSE BLDC GLUCOMTR-MCNC: 84 MG/DL — SIGNIFICANT CHANGE UP (ref 70–99)
GLUCOSE BLDC GLUCOMTR-MCNC: 85 MG/DL — SIGNIFICANT CHANGE UP (ref 70–99)

## 2025-01-21 PROCEDURE — 99232 SBSQ HOSP IP/OBS MODERATE 35: CPT

## 2025-01-21 PROCEDURE — 99223 1ST HOSP IP/OBS HIGH 75: CPT

## 2025-01-21 PROCEDURE — 93306 TTE W/DOPPLER COMPLETE: CPT | Mod: 26

## 2025-01-21 PROCEDURE — 70551 MRI BRAIN STEM W/O DYE: CPT | Mod: 26

## 2025-01-21 RX ORDER — INSULIN LISPRO 100/ML
VIAL (ML) SUBCUTANEOUS
Refills: 0 | Status: DISCONTINUED | OUTPATIENT
Start: 2025-01-21 | End: 2025-01-24

## 2025-01-21 RX ADMIN — Medication 3.12 MILLIGRAM(S): at 05:37

## 2025-01-21 RX ADMIN — Medication 100 MILLIGRAM(S): at 21:11

## 2025-01-21 RX ADMIN — MIRTAZAPINE 7.5 MILLIGRAM(S): 30 TABLET, FILM COATED ORAL at 13:13

## 2025-01-21 RX ADMIN — ACETAMINOPHEN, DIPHENHYDRAMINE HCL, PHENYLEPHRINE HCL 3 MILLIGRAM(S): 325; 25; 5 TABLET ORAL at 21:11

## 2025-01-21 RX ADMIN — Medication 2 TABLET(S): at 21:11

## 2025-01-21 RX ADMIN — Medication 5 MILLIGRAM(S): at 05:38

## 2025-01-21 RX ADMIN — ENTACAPONE 200 MILLIGRAM(S): 200 TABLET, FILM COATED ORAL at 05:36

## 2025-01-21 RX ADMIN — RIVASTIGMINE 1 PATCH: 4.6 PATCH, EXTENDED RELEASE TRANSDERMAL at 19:34

## 2025-01-21 RX ADMIN — RIVASTIGMINE 1 PATCH: 4.6 PATCH, EXTENDED RELEASE TRANSDERMAL at 07:30

## 2025-01-21 RX ADMIN — Medication 3.12 MILLIGRAM(S): at 18:14

## 2025-01-21 RX ADMIN — Medication 2 TABLET(S): at 05:36

## 2025-01-21 RX ADMIN — ENTACAPONE 200 MILLIGRAM(S): 200 TABLET, FILM COATED ORAL at 13:14

## 2025-01-21 RX ADMIN — RIVASTIGMINE 1 PATCH: 4.6 PATCH, EXTENDED RELEASE TRANSDERMAL at 05:35

## 2025-01-21 RX ADMIN — ASPIRIN 81 MILLIGRAM(S): 81 TABLET, COATED ORAL at 13:12

## 2025-01-21 RX ADMIN — ENOXAPARIN SODIUM 40 MILLIGRAM(S): 100 INJECTION SUBCUTANEOUS at 05:34

## 2025-01-21 RX ADMIN — POLYETHYLENE GLYCOL 3350 17 GRAM(S): 17 POWDER, FOR SOLUTION ORAL at 13:14

## 2025-01-21 RX ADMIN — Medication 1 TABLET(S): at 18:15

## 2025-01-21 RX ADMIN — ENTACAPONE 200 MILLIGRAM(S): 200 TABLET, FILM COATED ORAL at 21:11

## 2025-01-21 RX ADMIN — LEVETIRACETAM 500 MILLIGRAM(S): 750 TABLET, FILM COATED ORAL at 18:13

## 2025-01-21 RX ADMIN — Medication 2 TABLET(S): at 13:13

## 2025-01-21 RX ADMIN — ANTISEPTIC SURGICAL SCRUB 1 APPLICATION(S): 0.04 SOLUTION TOPICAL at 05:37

## 2025-01-21 RX ADMIN — LEVETIRACETAM 500 MILLIGRAM(S): 750 TABLET, FILM COATED ORAL at 05:36

## 2025-01-21 RX ADMIN — GABAPENTIN 100 MILLIGRAM(S): 800 TABLET ORAL at 21:11

## 2025-01-21 NOTE — CONSULT NOTE ADULT - ATTENDING COMMENTS
74 year old man with Parkinson's, prior strokes (without reported residual deficits, on ASA), dementia, seizure d/o (on Keppra) presented with altered mentation (confusion, difficulty speaking) along with generalized weakness and multiple witnessed falls; neurology consulted for altered mentation given lack of toxic metabolic causes. On exam, patient awake, not oriented to month, has poor insight, follows most simple commands, difficulty naming some objects but repetition intact, mild dysarthria, R facial droop, L hand resting tremor with postural component, reflexes: RLE 3+, LLE 2+, R upgoing toe. Given focal findings along with suspected expressive aphasia, would rule out acute stroke. Rest as above.

## 2025-01-21 NOTE — PROGRESS NOTE ADULT - SUBJECTIVE AND OBJECTIVE BOX
Patient is a 74y old Male who presents with a chief complaint of Recurrent witnessed falls, AMS (19 Jan 2025 14:22)    Today is hospital day     Overnight events/Interval History:  - No acute overnight events  - At bedside, patient feels well, noted to have a component of expressive aphasia, patient stated this is new for him, been going on for past 1-2 days. Patient is able to answer direct, simple questions, but has difficulty with long answers or open-ended questions.      ROS:  - limited 2/2 mental status    Code Status: full code    ALLERGIES:  sulfa drugs (Unknown)  cefaclor (Unknown)  penicillin (Unknown)    MEDICATIONS:  STANDING MEDICATIONS  amLODIPine   Tablet 5 milliGRAM(s) Oral daily  aspirin  chewable 81 milliGRAM(s) Oral daily  carbidopa/levodopa  25/100 2 Tablet(s) Oral three times a day  carbidopa/levodopa CR 50/200 1 Tablet(s) Oral <User Schedule>  carvedilol 3.125 milliGRAM(s) Oral every 12 hours  chlorhexidine 2% Cloths 1 Application(s) Topical <User Schedule>  dextrose 5%. 1000 milliLiter(s) IV Continuous <Continuous>  dextrose 5%. 1000 milliLiter(s) IV Continuous <Continuous>  dextrose 50% Injectable 25 Gram(s) IV Push once  dextrose 50% Injectable 12.5 Gram(s) IV Push once  dextrose 50% Injectable 25 Gram(s) IV Push once  enoxaparin Injectable 40 milliGRAM(s) SubCutaneous every 24 hours  entacapone 200 milliGRAM(s) Oral three times a day  gabapentin 100 milliGRAM(s) Oral at bedtime  glucagon  Injectable 1 milliGRAM(s) IntraMuscular once  insulin lispro (ADMELOG) corrective regimen sliding scale   SubCutaneous three times a day before meals  levETIRAcetam 500 milliGRAM(s) Oral two times a day  mirtazapine 7.5 milliGRAM(s) Oral daily  polyethylene glycol 3350 17 Gram(s) Oral daily  rivastigmine patch  9.5 mG/24 Hr(s) 1 Patch Transdermal every 24 hours  senna 2 Tablet(s) Oral at bedtime  traZODone 100 milliGRAM(s) Oral at bedtime    PRN MEDICATIONS  acetaminophen     Tablet .. 650 milliGRAM(s) Oral every 6 hours PRN  dextrose Oral Gel 15 Gram(s) Oral once PRN  melatonin 3 milliGRAM(s) Oral at bedtime PRN    VITALS LAST 24H:  Vital Signs Last 24 Hrs  T(C): 36.5 (21 Jan 2025 05:13), Max: 36.7 (20 Jan 2025 12:53)  T(F): 97.7 (21 Jan 2025 05:13), Max: 98 (20 Jan 2025 12:53)  HR: 58 (21 Jan 2025 05:13) (58 - 80)  BP: 135/88 (21 Jan 2025 05:13) (135/88 - 165/90)  BP(mean): --  RR: 18 (21 Jan 2025 05:13) (18 - 18)  SpO2: 96% (21 Jan 2025 05:13) (95% - 96%)    Parameters below as of 21 Jan 2025 05:13  Patient On (Oxygen Delivery Method): room air      PHYSICAL EXAM:  GENERAL: NAD, lying in bed comfortably  HEENT: Moist mucous membranes  CHEST/LUNG: Clear to auscultation bilaterally; normal respiratory effort  HEART: Regular rate and rhythm  ABDOMEN: Soft, nontender, nondistended  EXTREMITIES:  No lower extremity edema bilaterally  NERVOUS SYSTEM:  A&Ox2-3, selective expressive aphasia as noted above    LABS:                        RADIOLOGY:  CXR      CT

## 2025-01-21 NOTE — PROGRESS NOTE ADULT - ASSESSMENT
74 year old Male, PMH of Parkinson's, HTN, hx ischemic CVA x2, type 2 DM, dementia and chronic back pain, Hx of seizures on keppra, BIBA from [place] facility  or generalized weakness, confusion, decreased PO intake and multiple witnessed falls over the past few days. He had two witnessed falls the day prior to admission where he was weak and slumped to the ground. No LOC, not on AC. Per staff, he is normally alert and oriented x 3 and able to perform ADLs, but for the past few days has been increasingly confused. He was evaluated in the ED the day prior for similar complaints, had a negative work up and was discharged.  The Patient is A+Ox2-3 on interview, poor historian, cannot recollect the events that occurred, does endorse pain around the left eye.   Denies HA, f/c, CP, SOB, abdominal pain, dysuria, n/v/diarrhea, blood in stool or urine  Tried reaching [facility] for further information with no answer.     1. Generalized weakness associated with Recurrent Falls    in a pt with parkinson's disease and dementia           -TSH/ VIt D >> wnl  - Trauma w/up: CTH (1/15) neg, CXR negative, xray pelvis no acute fracture  - Infectious workup neg   - Orthostatic  > Neg     - Fall precautions, frequent orientation   - c/w home meds: carbidopa/levodopa, entacapone, rivastigmine patch  - Palliative care consult appreciated   - noted to have Expressive/ Motor aphasia ? New onset     >> TTE 01/21 >> wnl     >> agree with Neuro eval for PD meds adjustment/ review  vs MRI     2. Hx of CVA  - MRI 1/21/23: There is no evidence of acute intracranial pathology. No evidence of acute infarct, intracranial hemorrhage or mass effect. Moderate chronic microvascular type changes as well as chronic lacunar infarcts as detailed.  - Not on statin? unclear why  - C/w aspirin     3. Hx of seizures  - C/w Keppra    4.  HTN  - not currently on any home meds  - Monitor     5.  DM II ??   - Hgba1c 4.9     6. Hx of constipation   - Cont laxative       #DVT prophylaxis: Lovenox 40mg   #GI prophylaxis: not needed  #Diet: cc  #Activity: increase as tolerated    Pending: Neuro eval /     Dispo: 4A vs STR

## 2025-01-21 NOTE — PROGRESS NOTE ADULT - SUBJECTIVE AND OBJECTIVE BOX
ZAKIA MILLS  74y  Male      Patient is a 74y old  Male who presents with a chief complaint of Recurrent witnessed falls, AMS    INTERVAL HPI/OVERNIGHT EVENTS:      ******************************* REVIEW OF SYSTEMS:**********************************************    All other review of systems negative    *********************** VITALS ******************************************    T(F): 98.2 (01-21-25 @ 12:00)  HR: 77 (01-21-25 @ 12:00) (58 - 80)  BP: 140/80 (01-21-25 @ 12:00) (135/88 - 165/90)  RR: 18 (01-21-25 @ 12:00) (18 - 18)  SpO2: 96% (01-21-25 @ 05:13) (96% - 96%)            ******************************** PHYSICAL EXAM:**************************************************  GENERAL: NAD    PSYCH: no agitation, baseline mentation  HEENT:     NERVOUS SYSTEM:  Alert & awake x2, but having difficulty to express, ( new as per pt)    PULMONARY: LUCIE, CTA    CARDIOVASCULAR: S1S2 RRR    GI: Soft, NT, ND; BS present.    EXTREMITIES:  2+ Peripheral Pulses, No clubbing, cyanosis, or edema    LYMPH: No lymphadenopathy noted    SKIN: No rashes or lesions      **************************** LABS *******************************************************                          15.4   8.03  )-----------( 122      ( 20 Jan 2025 05:16 )             45.0     01-20    144  |  108  |  11  ----------------------------<  68[L]  4.7   |  27  |  0.9    Ca    9.0      20 Jan 2025 05:16  Mg     1.8     01-20    TPro  5.8[L]  /  Alb  3.7  /  TBili  1.0  /  DBili  x   /  AST  14  /  ALT  <5  /  AlkPhos  78  01-20      Urinalysis Basic - ( 20 Jan 2025 05:16 )    Color: x / Appearance: x / SG: x / pH: x  Gluc: 68 mg/dL / Ketone: x  / Bili: x / Urobili: x   Blood: x / Protein: x / Nitrite: x   Leuk Esterase: x / RBC: x / WBC x   Sq Epi: x / Non Sq Epi: x / Bacteria: x        Lactate Trend        CAPILLARY BLOOD GLUCOSE      POCT Blood Glucose.: 129 mg/dL (21 Jan 2025 11:10)          **************************Active Medications *******************************************  sulfa drugs (Unknown)  cefaclor (Unknown)  penicillin (Unknown)      acetaminophen     Tablet .. 650 milliGRAM(s) Oral every 6 hours PRN  amLODIPine   Tablet 5 milliGRAM(s) Oral daily  aspirin  chewable 81 milliGRAM(s) Oral daily  carbidopa/levodopa  25/100 2 Tablet(s) Oral three times a day  carbidopa/levodopa CR 50/200 1 Tablet(s) Oral <User Schedule>  carvedilol 3.125 milliGRAM(s) Oral every 12 hours  chlorhexidine 2% Cloths 1 Application(s) Topical <User Schedule>  dextrose 5%. 1000 milliLiter(s) IV Continuous <Continuous>  dextrose 5%. 1000 milliLiter(s) IV Continuous <Continuous>  dextrose 50% Injectable 25 Gram(s) IV Push once  dextrose 50% Injectable 12.5 Gram(s) IV Push once  dextrose 50% Injectable 25 Gram(s) IV Push once  dextrose Oral Gel 15 Gram(s) Oral once PRN  enoxaparin Injectable 40 milliGRAM(s) SubCutaneous every 24 hours  entacapone 200 milliGRAM(s) Oral three times a day  gabapentin 100 milliGRAM(s) Oral at bedtime  glucagon  Injectable 1 milliGRAM(s) IntraMuscular once  insulin lispro (ADMELOG) corrective regimen sliding scale   SubCutaneous three times a day before meals  levETIRAcetam 500 milliGRAM(s) Oral two times a day  melatonin 3 milliGRAM(s) Oral at bedtime PRN  mirtazapine 7.5 milliGRAM(s) Oral daily  polyethylene glycol 3350 17 Gram(s) Oral daily  rivastigmine patch  9.5 mG/24 Hr(s) 1 Patch Transdermal every 24 hours  senna 2 Tablet(s) Oral at bedtime  traZODone 100 milliGRAM(s) Oral at bedtime      ***************************************************  RADIOLOGY & ADDITIONAL TESTS:    Imaging Personally Reviewed:  [ ] YES  [ ] NO    HEALTH ISSUES - PROBLEM Dx:  Weakness    Confusion    Dementia    Parkinson disease    Advance care planning    Palliative care by specialist

## 2025-01-21 NOTE — PROGRESS NOTE ADULT - ASSESSMENT
74 year old Male, PMH of Parkinson's, HTN, hx ischemic CVA x2, type 2 DM, dementia and chronic back pain, Hx of seizures on keppra, BIBA from [facility] facility  or generalized weakness, confusion, decreased PO intake and multiple witnessed falls over the past few days.    #Recurrent falls associated w/ confusion weakness  #?Expressive aphasia  #Hx parkinson's disease   #Hx dementia  #Hx ischemic CVA x2  #Hx seizure disorder   - Trauma w/u negative: CTH, CXR, Xray pelvis   - Infectious w/u negative: afebrile, no leukocytosis, UA(-), trop 11> 9  - orthostatics: negative   - TSH, vit D wnl  - c/w home meds: carbidopa/levodopa, entacapone, rivastigmine patch (submitted nonformulary- f/u if accepted)  - c/w ASA 81mg qd    -c/w keppra 500mg bid  - c/w gabapentin 100mg qhs   - c/w mirtazapine 7.5mg qd   - c/w trazodone 100mg qhs  - not on statin (?)  - Palliative care consult appreciated -  - f/u neuro c/s  - f/u EEG, echo to complete fall/syncope workup  - remote telemetry  - pt may benefit from MRI, will follow up neurology recommendations     #HTN  #DM2  - decreased coreg to 3.25mg BID   - c/w amlodipine 5mg qd for now  - A1C 4.9  - Monitor   - FS TIDAC qhs, ISS       ---------------------  DVT ppx: lovenox  Diet: CCC w/ evening snack  GI ppx/Bowel regimen: miralax, senna   Activity: IAT  GOC: full  Dispo: 4A vs CHARIS  #Summary/Handoff:  - neuro, echo, eeg, poss mri, remote tele

## 2025-01-21 NOTE — CONSULT NOTE ADULT - ASSESSMENT
74 year old Male, PMH of Parkinson's, HTN, hx ischemic CVA x2, type 2 DM, dementia and chronic back pain, Hx of seizures on Keppra, BIBA from [facility] facility for generalized weakness, confusion, decreased PO intake and multiple witnessed falls over the past few days. Neurology was consulted for AMS w/ no clear cause, now with expressive aphasia.     From both patient and information obtained from collateral, patient has had recent mental status change including expressive aphasia, impaired short term memory and confusion. Neurological exam was remarkable for a R facial droop, brisk R patellar reflex, positive R babinski reflex, a trembling in his lower left lip, and a left-handed resting tremor with a postural component. The R facial droop was first noted yesterday by collateral/primary contact. Given the patient's focal findings and recent change in mental status, and history of strokes, we would like further imaging done to rule out recent strokes. The patient's Parkinsonian symptoms seem to be well-controlled under his current medication regimen.     Recommendation:   - MRI Brain without contrast  - Continue with current medications  74 year old Male, PMH of Parkinson's, HTN, hx ischemic CVA x2, type 2 DM, dementia and chronic back pain, Hx of seizures on Keppra, BIBA from [facility] for generalized weakness, confusion, decreased PO intake and multiple witnessed falls over the past few days. Neurology was consulted for AMS w/ no clear cause, now with expressive aphasia.     From information obtained from both patient and collateral, patient has had a recent decline in mental status including expressive aphasia, impaired short term memory and confusion. Neurological exam was remarkable for a R facial droop, brisk R patellar reflex, positive R babinski reflex, a trembling in his lower left lip, and a left-handed resting tremor with a postural component. The R facial droop was first noted yesterday by collateral/primary contact. Given the patient's history of strokes, current focal findings, and recent change in mental status, we would recommend further imaging be performed to rule out recent strokes. The patient's Parkinsonian symptoms seem to be well-controlled under his current medication regimen.     Recommendation:   - MRI Brain without contrast  - Continue with current medications  74 year old Male, PMH of Parkinson's, HTN, hx ischemic CVA x2, type 2 DM, dementia and chronic back pain, Hx of seizures on Keppra, BIBA from [facility] for generalized weakness, confusion, decreased PO intake and multiple witnessed falls over the past few days. Neurology was consulted for AMS w/ no clear cause, now with expressive aphasia.     From information obtained from both patient and collateral, patient has had a recent decline in mental status including expressive aphasia, impaired short term memory and confusion. Neurological exam was remarkable for a R facial droop, brisk R patellar reflex, positive R babinski reflex, a trembling in his lower left lip, and a left-handed resting tremor with a postural component. The R facial droop was first noted yesterday by collateral/primary contact. Given the patient's history of strokes, current focal findings, and recent change in mental status, we would recommend further imaging be performed to rule out recent strokes. The patient's Parkinsonian symptoms seem to be well-controlled under his current medication regimen.     Recommendation:   - MRI Brain without contrast  - Continue with home PD medications   - delirium precautions

## 2025-01-22 DIAGNOSIS — I63.9 CEREBRAL INFARCTION, UNSPECIFIED: ICD-10-CM

## 2025-01-22 LAB
CHOLEST SERPL-MCNC: 152 MG/DL — SIGNIFICANT CHANGE UP
FLUAV AG NPH QL: SIGNIFICANT CHANGE UP
FLUBV AG NPH QL: SIGNIFICANT CHANGE UP
GLUCOSE BLDC GLUCOMTR-MCNC: 110 MG/DL — HIGH (ref 70–99)
GLUCOSE BLDC GLUCOMTR-MCNC: 110 MG/DL — HIGH (ref 70–99)
GLUCOSE BLDC GLUCOMTR-MCNC: 159 MG/DL — HIGH (ref 70–99)
GLUCOSE BLDC GLUCOMTR-MCNC: 86 MG/DL — SIGNIFICANT CHANGE UP (ref 70–99)
HDLC SERPL-MCNC: 36 MG/DL — LOW
LIPID PNL WITH DIRECT LDL SERPL: 101 MG/DL — HIGH
NON HDL CHOLESTEROL: 116 MG/DL — SIGNIFICANT CHANGE UP
RSV RNA NPH QL NAA+NON-PROBE: SIGNIFICANT CHANGE UP
SARS-COV-2 RNA SPEC QL NAA+PROBE: SIGNIFICANT CHANGE UP
TRIGL SERPL-MCNC: 79 MG/DL — SIGNIFICANT CHANGE UP

## 2025-01-22 PROCEDURE — 99233 SBSQ HOSP IP/OBS HIGH 50: CPT

## 2025-01-22 PROCEDURE — 99232 SBSQ HOSP IP/OBS MODERATE 35: CPT

## 2025-01-22 PROCEDURE — 99221 1ST HOSP IP/OBS SF/LOW 40: CPT

## 2025-01-22 PROCEDURE — 70450 CT HEAD/BRAIN W/O DYE: CPT | Mod: 26

## 2025-01-22 PROCEDURE — 95816 EEG AWAKE AND DROWSY: CPT | Mod: 26

## 2025-01-22 PROCEDURE — 70496 CT ANGIOGRAPHY HEAD: CPT | Mod: 26

## 2025-01-22 PROCEDURE — 70498 CT ANGIOGRAPHY NECK: CPT | Mod: 26

## 2025-01-22 RX ORDER — ATORVASTATIN CALCIUM 80 MG/1
40 TABLET, FILM COATED ORAL AT BEDTIME
Refills: 0 | Status: DISCONTINUED | OUTPATIENT
Start: 2025-01-22 | End: 2025-01-23

## 2025-01-22 RX ADMIN — Medication 2 TABLET(S): at 21:55

## 2025-01-22 RX ADMIN — RIVASTIGMINE 1 PATCH: 4.6 PATCH, EXTENDED RELEASE TRANSDERMAL at 07:37

## 2025-01-22 RX ADMIN — RIVASTIGMINE 1 PATCH: 4.6 PATCH, EXTENDED RELEASE TRANSDERMAL at 06:55

## 2025-01-22 RX ADMIN — ASPIRIN 81 MILLIGRAM(S): 81 TABLET, COATED ORAL at 11:17

## 2025-01-22 RX ADMIN — Medication 2 TABLET(S): at 21:56

## 2025-01-22 RX ADMIN — GABAPENTIN 100 MILLIGRAM(S): 800 TABLET ORAL at 21:55

## 2025-01-22 RX ADMIN — Medication 2 TABLET(S): at 13:42

## 2025-01-22 RX ADMIN — ENTACAPONE 200 MILLIGRAM(S): 200 TABLET, FILM COATED ORAL at 06:54

## 2025-01-22 RX ADMIN — MIRTAZAPINE 7.5 MILLIGRAM(S): 30 TABLET, FILM COATED ORAL at 11:16

## 2025-01-22 RX ADMIN — ENTACAPONE 200 MILLIGRAM(S): 200 TABLET, FILM COATED ORAL at 13:42

## 2025-01-22 RX ADMIN — Medication 5 MILLIGRAM(S): at 06:55

## 2025-01-22 RX ADMIN — Medication 3.12 MILLIGRAM(S): at 17:40

## 2025-01-22 RX ADMIN — Medication 100 MILLIGRAM(S): at 21:55

## 2025-01-22 RX ADMIN — Medication 3.12 MILLIGRAM(S): at 06:55

## 2025-01-22 RX ADMIN — POLYETHYLENE GLYCOL 3350 17 GRAM(S): 17 POWDER, FOR SOLUTION ORAL at 11:16

## 2025-01-22 RX ADMIN — ANTISEPTIC SURGICAL SCRUB 1 APPLICATION(S): 0.04 SOLUTION TOPICAL at 07:03

## 2025-01-22 RX ADMIN — LEVETIRACETAM 500 MILLIGRAM(S): 750 TABLET, FILM COATED ORAL at 06:54

## 2025-01-22 RX ADMIN — ATORVASTATIN CALCIUM 40 MILLIGRAM(S): 80 TABLET, FILM COATED ORAL at 21:51

## 2025-01-22 RX ADMIN — ENOXAPARIN SODIUM 40 MILLIGRAM(S): 100 INJECTION SUBCUTANEOUS at 06:55

## 2025-01-22 RX ADMIN — LEVETIRACETAM 500 MILLIGRAM(S): 750 TABLET, FILM COATED ORAL at 17:39

## 2025-01-22 RX ADMIN — Medication 1 TABLET(S): at 17:40

## 2025-01-22 RX ADMIN — Medication 1: at 12:19

## 2025-01-22 RX ADMIN — ENTACAPONE 200 MILLIGRAM(S): 200 TABLET, FILM COATED ORAL at 21:52

## 2025-01-22 RX ADMIN — Medication 2 TABLET(S): at 06:55

## 2025-01-22 NOTE — CHART NOTE - NSCHARTNOTEFT_GEN_A_CORE
MR brain resulted as acute stroke in left corona radiata.  Tried calling neuro to inform them 5 times, could not be reached.  Pt already on aspirin, added atorvastatin.  Follow up neuro plan, possibly add plavix for 21 days?

## 2025-01-22 NOTE — PROGRESS NOTE ADULT - ASSESSMENT
75 y/o M with a PMHx of Parkinson's disease, HTN, hx ischemic stroke, DM II, dementia and chronic back pain, Hx of seizures on Keppra, BIBA from [facility] facility for generalized weakness, confusion, decreased PO intake and multiple witnessed falls over the past few days. Admitted to medicine for AMS, Neurology was consulted for AMS despite lack of metabolic derangements. From information obtained from both patient and collateral, patient has had a recent decline in mental status including expressive aphasia, impaired short term memory and confusion for the past couple of months. Evaluated by general neurology on 1/21 and noted to have R facial droop, brisk R patellar reflex, positive R babinski reflex, a trembling in his lower left lip, and a left-handed resting tremor with a postural component. Apparently, R facial droop was first noted yesterday 1/20 by collateral/primary contact. CTH on 1/15 negative. MRI brain non con obtained overnight showing acute L corona radiata infarct. Neurovascular to follow today.  75 y/o M with a PMHx of Parkinson's disease, HTN, hx ischemic stroke, DM II, dementia and chronic back pain, Hx of seizures on Keppra, admitted to medicine for AMS, Neurology was consulted for AMS despite lack of metabolic derangements. Evaluated by general neurology on 1/21 and noted to have R facial droop, brisk R patellar reflex, positive R babinski reflex, a trembling in his lower left lip, and a left-handed resting tremor with a postural component. CTH on 1/15 negative. MRI brain non con obtained overnight showing acute L corona radiata infarct. Neurovascular to follow today. Exam with R sided deficits and expressive aphasia however new onset L tongue deviation and mild dysarthria. Chronic L resting tremor noted.     Impression: 75 y/o M with L bhatt radiata infract correlating with exam which is significant for R facial droop and expressive aphasia however today's exam differing from yesterdays exam as pt with new dysarthria and L tongue deviation. Will obtain stroke work up to determine etiology.     RECS:  -CTA head and neck   -Continue aspirin 81mg daily for now   -LDL, TSH, A1C   -EP for loop recorder   -dysphagia screening   -TTE completed and insignificant  -speech therapy, PT/OT   - atorvastatin 80mg daily  - q4hr stroke neuro checks and vitals  - -160  -medical management per primary team     Discussed with Dr. Locke

## 2025-01-22 NOTE — PROGRESS NOTE ADULT - ASSESSMENT
74M PMH of Parkinson's, HTN, hx ischemic CVA x2, type 2 DM, dementia and chronic back pain, Hx of seizures on keppra presents from facility with weakness, falls, and confusion likely in the setting of his underlying Parkinson's/dementia. Palliative care consulted for [facility].    Patient with acute stroke over weekend. He was able to answer yes/no questions, but had word finding difficulties.  Attempted to reach [facility]/HCP renny Claudio voicemail.      Recommendations  -Full code  -ongoing medical management  -HCP is friend Shanon  -continued care per neuro  -will follow    MEDD (morphine equivalent daily dose):    Education about palliative care provided to patient/family.  See Recs below.    Please call [phone] with questions or concerns 24/7.

## 2025-01-22 NOTE — PROGRESS NOTE ADULT - SUBJECTIVE AND OBJECTIVE BOX
SUBJECTIVE/OVERNIGHT EVENTS  Today is hospital day 6d. This morning patient was seen and examined at bedside, resting comfortably in bed. No acute or major events overnight.    MEDICATIONS  STANDING MEDICATIONS  amLODIPine   Tablet 5 milliGRAM(s) Oral daily  aspirin  chewable 81 milliGRAM(s) Oral daily  atorvastatin 40 milliGRAM(s) Oral at bedtime  carbidopa/levodopa  25/100 2 Tablet(s) Oral three times a day  carbidopa/levodopa CR 50/200 1 Tablet(s) Oral <User Schedule>  carvedilol 3.125 milliGRAM(s) Oral every 12 hours  chlorhexidine 2% Cloths 1 Application(s) Topical <User Schedule>  dextrose 5%. 1000 milliLiter(s) IV Continuous <Continuous>  dextrose 5%. 1000 milliLiter(s) IV Continuous <Continuous>  dextrose 50% Injectable 25 Gram(s) IV Push once  dextrose 50% Injectable 12.5 Gram(s) IV Push once  dextrose 50% Injectable 25 Gram(s) IV Push once  enoxaparin Injectable 40 milliGRAM(s) SubCutaneous every 24 hours  entacapone 200 milliGRAM(s) Oral three times a day  gabapentin 100 milliGRAM(s) Oral at bedtime  glucagon  Injectable 1 milliGRAM(s) IntraMuscular once  insulin lispro (ADMELOG) corrective regimen sliding scale   SubCutaneous three times a day before meals  levETIRAcetam 500 milliGRAM(s) Oral two times a day  mirtazapine 7.5 milliGRAM(s) Oral daily  polyethylene glycol 3350 17 Gram(s) Oral daily  rivastigmine patch  9.5 mG/24 Hr(s) 1 Patch Transdermal every 24 hours  senna 2 Tablet(s) Oral at bedtime  traZODone 100 milliGRAM(s) Oral at bedtime    PRN MEDICATIONS  acetaminophen     Tablet .. 650 milliGRAM(s) Oral every 6 hours PRN  dextrose Oral Gel 15 Gram(s) Oral once PRN  melatonin 3 milliGRAM(s) Oral at bedtime PRN    VITALS  T(F): 97.6 (01-22-25 @ 05:18), Max: 98.4 (01-21-25 @ 20:58)  HR: 57 (01-22-25 @ 05:18) (55 - 66)  BP: 146/89 (01-22-25 @ 05:18) (146/89 - 162/91)  RR: 18 (01-22-25 @ 05:18) (18 - 18)  SpO2: 96% (01-22-25 @ 05:18) (96% - 98%)  POCT Blood Glucose.: 159 mg/dL (01-22-25 @ 11:59)  POCT Blood Glucose.: 86 mg/dL (01-22-25 @ 07:53)  POCT Blood Glucose.: 126 mg/dL (01-21-25 @ 20:49)  POCT Blood Glucose.: 85 mg/dL (01-21-25 @ 18:04)    PHYSICAL EXAM  GENERAL  (  x) NAD, lying in bed comfortably     (  ) obtunded     (  ) lethargic     (  ) somnolent    HEAD  (x  ) Atraumatic     (  ) hematoma     (  ) laceration (specify location:       )     NECK  ( x ) Supple     (  ) neck stiffness     (  ) nuchal rigidity     (  )  no JVD     (  ) JVD present ( -- cm)    HEART  Rate -->  ( x ) normal rate    (  ) bradycardic    (  ) tachycardic  Rhythm -->  (  ) regular    (  ) regularly irregular    (  ) irregularly irregular  Murmurs -->  (  ) normal s1/s2    (  ) systolic murmur    (  ) diastolic murmur    (  ) continuous murmur     (  ) S3 present    (  ) S4 present    LUNGS  (x  )Unlabored respirations     (  ) tachypnea  (  ) B/L air entry     (  ) decreased breath sounds in:  (location     )    (  ) no adventitious sound     (  ) crackles     (  ) wheezing      (  ) rhonchi      (specify location:       )  (  ) chest wall tenderness (specify location:       )    ABDOMEN  (x  ) Soft     (  ) tense   |   (  ) nondistended     (  ) distended   |   (  ) +BS     (  ) hypoactive bowel sounds     (  ) hyperactive bowel sounds  (  ) nontender     (  ) RUQ tenderness     (  ) RLQ tenderness     (  ) LLQ tenderness     (  ) epigastric tenderness     (  ) diffuse tenderness  (  ) Splenomegaly      (  ) Hepatomegaly      (  ) Jaundice     (  ) ecchymosis     EXTREMITIES  ( x ) Normal     (  ) Rash     (  ) ecchymosis     (  ) varicose veins      (  ) pitting edema     (  ) non-pitting edema   (  ) ulceration     (  ) gangrene:     (location:     )    NERVOUS SYSTEM  ( x ) A&Ox2     (  x) confused     (  ) lethargic  CN II-XII:     (  ) Intact     (  x) focal deficits  (Specify: R sided facial droop, slurred speech )   Upper extremities:     (4  ) strength X/5     (  ) focal deficit (specify:    )  Lower extremities:     (4  ) strength  X/5    (  ) focal deficit (specify:    )    SKIN  (  ) No rashes or lesions     (  ) maculopapular rash     (  ) pustules     (  ) vesicles     (  ) ulcer     (  ) ecchymosis     (specify location:     )    (  ) Indwelling Ewing Catheter   Date insterted:    Reason (  ) Critical illness     (  ) urinary retention    (  ) Accurate Ins/Outs Monitoring     (  ) CMO patient    (  ) Central Line  Date inserted:  Location: (  ) Right IJ   (  ) Left IJ   (  ) Right Fem   (  ) Left Fem    (  ) SPC  (  ) pigtail  (  ) PEG tube  (  ) colostomy  (  ) jejunostomy  (  ) U-Dall    IMAGING  MR Head No Cont (01.21.25 @ 17:20)  IMPRESSION:    1.  Acute white matter infarct left corona radiata    2.  Moderate microvascular ischemic changes.

## 2025-01-22 NOTE — PROGRESS NOTE ADULT - ASSESSMENT
74 year old Male, PMH of Parkinson's, HTN, hx ischemic CVA x2, type 2 DM, dementia and chronic back pain, Hx of seizures on keppra, BIBA from [facility] [place] facility  or generalized weakness, confusion, decreased PO intake and multiple witnessed falls over the past few days.    #Recurrent falls associated w/ confusion weakness  #Expressive aphasia  #Hx parkinson's disease   #Hx dementia  #Hx ischemic CVA x2  #Hx seizure disorder   - Trauma w/u negative: CTH, CXR, Xray pelvis   - Infectious w/u negative: afebrile, no leukocytosis, UA(-), trop 11> 9  - orthostatics: negative   - TSH, vit D wnl  - c/w home meds: carbidopa/levodopa, entacapone, rivastigmine patch (submitted nonformulary- f/u if accepted)  - c/w ASA 81mg qd    -c/w keppra 500mg bid  - c/w gabapentin 100mg qhs   - c/w mirtazapine 7.5mg qd   - c/w trazodone 100mg qhs  - Palliative care consult appreciated  - EEG: Findings consistent with mild diffuse electrocerebral dysfunction secondary to nonspecific etiology  - Echo: EF 69%, wnl  - remote telemetry due to stroke  - MRI showing acute stroke of L corona radiata  - neuro following  - f/u CTA head and neck  - started high dose statin  - c/w aspirin, consider starting plavix  - f/u LDL, TSH, A1C  - EP for loop recorder  - Speech language pathology evaluated - mod to severe dysarthria, consistent with PD  - S&S: thin liquid; easy to chew; regular solid  - f/u PT/OT   - q4 stroke neuro checks and vitals  - SBP goal 120-160    #HTN  #DM2  - decreased coreg to 3.25mg BID   - c/w amlodipine 5mg qd for now  - A1C 4.9  - Monitor   - FS TIDAC qhs, ISS       ---------------------  DVT ppx: lovenox  Diet: soft and bite sized, CC w/ evening snack  GI ppx/Bowel regimen: miralax, senna   Activity: IAT  GOC: full    Pending: CTA head and neck, LDL, TSH, A1C, EP for loop recorder, PT/OT    74 year old Male, PMH of Parkinson's, HTN, hx ischemic CVA x2, type 2 DM, dementia and chronic back pain, Hx of seizures on keppra, BIBA from [place] facility  or generalized weakness, confusion, decreased PO intake and multiple witnessed falls over the past few days.    #Recurrent falls associated w/ confusion weakness  #Expressive aphasia  #Hx parkinson's disease   #Hx dementia  #Hx ischemic CVA x2  #Hx seizure disorder   - Trauma w/u negative: CTH, CXR, Xray pelvis   - Infectious w/u negative: afebrile, no leukocytosis, UA(-), trop 11> 9  - orthostatics: negative   - TSH, vit D wnl  - c/w home meds: carbidopa/levodopa, entacapone, rivastigmine patch (submitted nonformulary- f/u if accepted)  - c/w ASA 81mg qd    -c/w keppra 500mg bid  - c/w gabapentin 100mg qhs   - c/w mirtazapine 7.5mg qd   - c/w trazodone 100mg qhs  - Palliative care consult appreciated  - EEG: Findings consistent with mild diffuse electrocerebral dysfunction secondary to nonspecific etiology  - Echo: EF 69%, wnl  - remote telemetry due to stroke  - MRI showing acute stroke of L corona radiata  - neuro following  - f/u CTA head and neck  - started high dose statin  - c/w aspirin, consider starting plavix  - LDL, TSH, A1C wnl  - EP for loop recorder  - Speech language pathology evaluated - mod to severe dysarthria, consistent with PD  - S&S: thin liquid; easy to chew; regular solid  - f/u PT/OT   - q4 stroke neuro checks and vitals  - SBP goal 120-160    #HTN  #DM2  - decreased coreg to 3.25mg BID   - c/w amlodipine 5mg qd for now  - A1C 4.9  - Monitor   - FS TIDAC qhs, ISS       ---------------------  DVT ppx: lovenox  Diet: soft and bite sized, CC w/ evening snack  GI ppx/Bowel regimen: miralax, senna   Activity: IAT  GOC: full    Pending: CTA head and neck, LDL, TSH, A1C, EP for loop recorder, PT/OT

## 2025-01-22 NOTE — SPEECH LANGUAGE PATHOLOGY EVALUATION - SLP PERTINENT HISTORY OF CURRENT PROBLEM
73 y/o M with a PMHx of Parkinson's disease, HTN, hx ischemic stroke, DM II, dementia and chronic back pain, Hx of seizures on Keppra, BIBA from [facility] facility for generalized weakness, confusion, decreased PO intake and multiple witnessed falls over the past few days. Admitted to medicine for AMS, Evaluated by general neurology on 1/21 and noted to have R facial droop, brisk R patellar reflex, positive R babinski reflex, a trembling in his lower left lip, and a left-handed resting tremor with a postural component. Apparently, R facial droop was first noted yesterday 1/20 by collateral/primary contact. CTH on 1/15 negative. MRI brain non con obtained overnight showing acute L corona radiata infarct. Neurovascular to follow today.

## 2025-01-22 NOTE — CONSULT NOTE ADULT - SUBJECTIVE AND OBJECTIVE BOX
Neurology Consult    Patient is a 74y old  Male who presents with a chief complaint of Recurrent witnessed falls, AMS (21 Jan 2025 10:07)      HPI:  74 year old Male, PMH of Parkinson's, HTN, hx ischemic CVA x2, type 2 DM, dementia and chronic back pain, Hx of seizures on Keppra BIBA from [facility] for generalized weakness, confusion, decreased PO intake and multiple witnessed falls over the past few days. Neurology was consulted for AMS w/ no clear cause, now with expressive aphasia.     Upon evaluation, patient was trying to get out of bed but easily followed redirection to remain in bed. Patient had difficulty concentrating and following more complex commands. He would sometimes stare off and had to be reoriented to the exam. He was able to recognize some objects, such as a pen and thumb, but unable to recognize a watch or phone. He had difficulty expressing himself and seemed frustrated - he confirmed that he knew what he wanted to say but was having difficulty expressing himself. Patient is AOx2 (knows name and location, knows it is 2025 but not the month/season. Also does not know why he is in the hospital).    Neurological exam was remarkable for a R facial droop, brisk R patellar reflex, positive R babinski reflex, a trembling in his lower left lip, and a left-handed resting tremor with a postural component. Parkinsonian symptoms (tone, rigidity, tremor) seem to be well controlled.     Ashly Ryder Shanon Ayla (patient's manager and primary contact as patient has no family), [phone]  Patient was a brilliant musician. In 2008, he had a series of mini-strokes that no one was aware of. Then, in 2017/18, he had a major stroke that ended his music career - it was noticed because as he was taking a bow on stage, he fell from weakness. Following this fall, patient experienced weakness, constant vertigo, and impaired short term memory (could not recognize simple things). These all resolved following treatment. The patient was diagnosed with Parkinson's Disease 4 years ago and had his medications increased approximately 1 year ago - since then, patient has been getting stronger and more confident.     Regarding the patient's current presentation, Ms. Aguila states that the patient was very articulate a few days ago and that she first noticed the R facial droop yesterday. She confirms that the changes in mental status and facial drop are new.       PAST MEDICAL & SURGICAL HISTORY:  Parkinson disease      CVA (cerebral vascular accident)      HTN (hypertension)      Diabetes mellitus      Gout      History of appendectomy          FAMILY HISTORY:  FH: hypertension    FH: CAD (coronary artery disease)        Social History: (-) x 3    Allergies    sulfa drugs (Unknown)  cefaclor (Unknown)  penicillin (Unknown)    Intolerances        MEDICATIONS  (STANDING):  amLODIPine   Tablet 5 milliGRAM(s) Oral daily  aspirin  chewable 81 milliGRAM(s) Oral daily  carbidopa/levodopa  25/100 2 Tablet(s) Oral three times a day  carbidopa/levodopa CR 50/200 1 Tablet(s) Oral <User Schedule>  carvedilol 3.125 milliGRAM(s) Oral every 12 hours  chlorhexidine 2% Cloths 1 Application(s) Topical <User Schedule>  dextrose 5%. 1000 milliLiter(s) (100 mL/Hr) IV Continuous <Continuous>  dextrose 5%. 1000 milliLiter(s) (50 mL/Hr) IV Continuous <Continuous>  dextrose 50% Injectable 25 Gram(s) IV Push once  dextrose 50% Injectable 12.5 Gram(s) IV Push once  dextrose 50% Injectable 25 Gram(s) IV Push once  enoxaparin Injectable 40 milliGRAM(s) SubCutaneous every 24 hours  entacapone 200 milliGRAM(s) Oral three times a day  gabapentin 100 milliGRAM(s) Oral at bedtime  glucagon  Injectable 1 milliGRAM(s) IntraMuscular once  insulin lispro (ADMELOG) corrective regimen sliding scale   SubCutaneous three times a day before meals  levETIRAcetam 500 milliGRAM(s) Oral two times a day  mirtazapine 7.5 milliGRAM(s) Oral daily  polyethylene glycol 3350 17 Gram(s) Oral daily  rivastigmine patch  9.5 mG/24 Hr(s) 1 Patch Transdermal every 24 hours  senna 2 Tablet(s) Oral at bedtime  traZODone 100 milliGRAM(s) Oral at bedtime    MEDICATIONS  (PRN):  acetaminophen     Tablet .. 650 milliGRAM(s) Oral every 6 hours PRN Temp greater or equal to 38C (100.4F), Mild Pain (1 - 3)  dextrose Oral Gel 15 Gram(s) Oral once PRN Blood Glucose LESS THAN 70 milliGRAM(s)/deciliter  melatonin 3 milliGRAM(s) Oral at bedtime PRN Insomnia      Review of systems:    Constitutional: as per HPI  Eyes: No eye pain or discharge  ENMT:  No difficulty hearing; No sinus or throat pain  Neck: No pain or stiffness  Respiratory: No cough, wheezing, chills or hemoptysis  Cardiovascular: No chest pain, palpitations, shortness of breath, dyspnea on exertion  Gastrointestinal: No abdominal pain, nausea, vomiting or hematemesis; No diarrhea or constipation.   Genitourinary: No dysuria, frequency, hematuria or incontinence  Neurological: As per HPI  Skin: No rashes or lesions   Endocrine: No heat or cold intolerance; No hair loss  Musculoskeletal: No joint pain or swelling  Psychiatric: No depression, anxiety, mood swings  Heme/Lymph: No easy bruising or bleeding gums    Vital Signs Last 24 Hrs  T(C): 36.8 (21 Jan 2025 12:00), Max: 36.8 (21 Jan 2025 12:00)  T(F): 98.2 (21 Jan 2025 12:00), Max: 98.2 (21 Jan 2025 12:00)  HR: 77 (21 Jan 2025 12:00) (58 - 80)  BP: 140/80 (21 Jan 2025 12:00) (135/88 - 165/90)  BP(mean): --  RR: 18 (21 Jan 2025 12:00) (18 - 18)  SpO2: 96% (21 Jan 2025 05:13) (96% - 96%)    Parameters below as of 21 Jan 2025 05:13  Patient On (Oxygen Delivery Method): room air        Examination:  General:  Appearance is consistent with chronologic age.  Gross skin survey within normal limits.    Cognitive/Language:  The patient is AOx2 (to name and place). Recent and remote memory impaired.  Fund of knowledge is intact and normal.  Dysarthric. Expressive aphasia (trouble with naming, but no difficulty with comprehension or repetition).  Eyes: intact VA, VFF.  EOMI w/o nystagmus, skew or reported double vision.  PERRL.  No ptosis/weakness of eyelid closure.    Face:  Facial sensation normal V1 - 3, R facial droop. Mild tremor left lower lip  Ears/Nose/Throat:  Hearing grossly intact b/l.  Palate elevates midline.  Tongue and uvula midline.   Motor examination:   Normal tone, bulk and range of motion.  Resting tremor with postural component in L hand. No tenderness or twitching.  Formal Muscle Strength Testing: (MRC grade R/L) 5/5 UE; 5/5 LE.  No observable drift.  Reflexes:   Brisk 3+ R patellar reflext. 2+ L patellar reflex. 2+ b/l pectoralis, biceps, triceps, brachioradialis, and Achilles.  Plantar response downgoing L, upward going on R.  Clonus absent.  Sensory examination:   Intact to light touch and pinprick, pain, temperature and proprioception and vibration in all extremities.  Cerebellum:   FTN intact with normal CHESLEA in all limbs.  No dysmetria or dysdiadokinesia.  Gait deferred.    Respiratory:  no audible wheezing or inspiratory stridor.  no use of accessory muscles.   Cardiac: pulse palpable, no audible bruits  Abdomen: supple, no guarding, no TTP    Labs:   CBC Full  -  ( 20 Jan 2025 05:16 )  WBC Count : 8.03 K/uL  RBC Count : 4.53 M/uL  Hemoglobin : 15.4 g/dL  Hematocrit : 45.0 %  Platelet Count - Automated : 122 K/uL  Mean Cell Volume : 99.3 fL  Mean Cell Hemoglobin : 34.0 pg  Mean Cell Hemoglobin Concentration : 34.2 g/dL  Auto Neutrophil # : 5.06 K/uL  Auto Lymphocyte # : 2.09 K/uL  Auto Monocyte # : 0.74 K/uL  Auto Eosinophil # : 0.10 K/uL  Auto Basophil # : 0.02 K/uL  Auto Neutrophil % : 63.2 %  Auto Lymphocyte % : 26.0 %  Auto Monocyte % : 9.2 %  Auto Eosinophil % : 1.2 %  Auto Basophil % : 0.2 %    01-20    144  |  108  |  11  ----------------------------<  68[L]  4.7   |  27  |  0.9    Ca    9.0      20 Jan 2025 05:16  Mg     1.8     01-20    TPro  5.8[L]  /  Alb  3.7  /  TBili  1.0  /  DBili  x   /  AST  14  /  ALT  <5  /  AlkPhos  78  01-20    LIVER FUNCTIONS - ( 20 Jan 2025 05:16 )  Alb: 3.7 g/dL / Pro: 5.8 g/dL / ALK PHOS: 78 U/L / ALT: <5 U/L / AST: 14 U/L / GGT: x             Urinalysis Basic - ( 20 Jan 2025 05:16 )    Color: x / Appearance: x / SG: x / pH: x  Gluc: 68 mg/dL / Ketone: x  / Bili: x / Urobili: x   Blood: x / Protein: x / Nitrite: x   Leuk Esterase: x / RBC: x / WBC x   Sq Epi: x / Non Sq Epi: x / Bacteria: x          Neuroimaging:  [facility] (1/15): No acute intracranial pathology. No evidence of midline shift, mass effect or intracranial hemorrhage. Moderate chronic microvascular type changes as well as multiple chronic lacunar infarcts      
CC: generalized weakness, confusion, decreased PO intake and multiple falls    HPI:  74M PMH of Parkinson's, HTN, hx ischemic CVA x2, type 2 DM, dementia and chronic back pain, Hx of seizures on keppra BIBA from [facility]  or generalized weakness, confusion, decreased PO intake and multiple witnessed falls over the past few days. He had two witnessed falls the day prior to admission where he was weak and slumped to the ground. No LOC, not on AC. Per staff, he is normally alert and oriented x 3 and able to perform ADLs, but for the past few days has been increasingly confused. He was evaluated in the ED the day prior for similar complaints, had a negative work up and was discharged.  The Patient is A+Ox2-3 on interview, poor historian, cannot recollect the events that occurred, does endorse pain around the left eye.   Denies HA, f/c, CP, SOB, abdominal pain, dysuria, n/v/diarrhea, blood in stool or urine  Tried reaching [facility] for further information with no answer.       · BP Systolic 161 mm Hg  · BP Diastolic 96 mm Hg  · Heart Rate 78 /min  · Respiration Rate (breaths/min) 18 /min  · Temp (F) 98.2 Degrees F  · SpO2 (%) 98 % on RA     Labs: CBC, CMP wnl, trop 11 --> 9  UA negative  CT[facility] (1/15): No acute intracranial pathology. No evidence of midline shift, mass effect or intracranial hemorrhage. Moderate chronic microvascular type changes as well as multiple chronic lacunar infarcts, detailed above.    CXR: No radiographic evidence of acute cardiopulmonary disease.  xray pelvis: Degenerative changes of the lower lumbar spine. Proliferative changes of the pelvis. No acute displaced fracture.     (2025 21:57)    PERTINENT PM/SXH:   Parkinson disease    CVA (cerebral vascular accident)    HTN (hypertension)    Diabetes mellitus    Gout      No significant past surgical history    History of appendectomy      FAMILY HISTORY:  FH: hypertension    FH: CAD (coronary artery disease)      None pertinent    ITEMS NOT CHECKED ARE NOT PRESENT    SOCIAL HISTORY:   Significant other/partner[ ]  Children[ ]  Yarsanism/Spirituality:  Substance hx:  [ ]   Tobacco hx:  [ ]   Alcohol hx: [ ]   Living Situation: [ ]Home  [x ]Long term care  [ ]Rehab [ ]Other  Home Services: [ ] HHA [ ] Visting RN [ ] Hospice  Occupation:  Home Opioid hx:  [ ] Y [ ] N [x ] I-Stop Reference No:    Reference #: [id number] - no meds     ADVANCE DIRECTIVES:     [x ] Full Code [ ] DNR  MOLST  [ ]  Living Will  [ ]   DECISION MAKER(s):  [ x] Health Care Proxy(s)  [ ] Surrogate(s)  [ ] Guardian           Name(s): Phone Number(s):  Shanon Aguila      BASELINE (I)ADL(s) (prior to admission):    [place]: [ ]Total  [ ] Moderate [ ]Dependent  Palliative Performance Status Version 2:         %    http://npcrc.org/files/news/palliative_performance_scale_ppsv2.pdf    Allergies    sulfa drugs (Unknown)  cefaclor (Unknown)  penicillin (Unknown)    Intolerances    MEDICATIONS  (STANDING):  aspirin  chewable 81 milliGRAM(s) Oral daily  carbidopa/levodopa  25/100 2 Tablet(s) Oral three times a day  carbidopa/levodopa CR 50/200 1 Tablet(s) Oral <User Schedule>  carvedilol 6.25 milliGRAM(s) Oral every 12 hours  dextrose 5%. 1000 milliLiter(s) (50 mL/Hr) IV Continuous <Continuous>  dextrose 5%. 1000 milliLiter(s) (100 mL/Hr) IV Continuous <Continuous>  dextrose 50% Injectable 25 Gram(s) IV Push once  dextrose 50% Injectable 12.5 Gram(s) IV Push once  dextrose 50% Injectable 25 Gram(s) IV Push once  enoxaparin Injectable 40 milliGRAM(s) SubCutaneous every 24 hours  entacapone 200 milliGRAM(s) Oral three times a day  gabapentin 100 milliGRAM(s) Oral at bedtime  glucagon  Injectable 1 milliGRAM(s) IntraMuscular once  insulin lispro (ADMELOG) corrective regimen sliding scale   SubCutaneous three times a day before meals  levETIRAcetam 500 milliGRAM(s) Oral two times a day  mirtazapine 7.5 milliGRAM(s) Oral daily  polyethylene glycol 3350 17 Gram(s) Oral daily  rivastigmine patch  9.5 mG/24 Hr(s) 1 Patch Transdermal every 24 hours  senna 2 Tablet(s) Oral at bedtime  traZODone 100 milliGRAM(s) Oral at bedtime    MEDICATIONS  (PRN):  acetaminophen     Tablet .. 650 milliGRAM(s) Oral every 6 hours PRN Temp greater or equal to 38C (100.4F), Mild Pain (1 - 3)  dextrose Oral Gel 15 Gram(s) Oral once PRN Blood Glucose LESS THAN 70 milliGRAM(s)/deciliter  melatonin 3 milliGRAM(s) Oral at bedtime PRN Insomnia    PRESENT SYMPTOMS: [ ]Unable to obtain due to poor mentation   Source if other than patient:  [ ]Family   [ ]Team     Pain: [ ]yes [ x]no  ALL PAIN ASSESSMENT COMPONENTS WERE ASKED ABOUT UNLESS OTHERWISE NOTED  QOL impact -   Location -                    Aggravating factors -  Quality -  Radiation -  Timing-  Severity (0-10 scale):  Minimal acceptable level (0-10 scale):     CPOT:    https://[email or web address]/getattachment/[email or web address]/Critical-Care-Pain-Observation-Tool-(CPOT)    PAIN AD Score:   [email or web address]/painad.pdf (press ctrl +  left click to view)    Dyspnea:                           [ x]None[ ]Mild [ ]Moderate [ ]Severe     Respiratory Distress Observation Scale (RDOS):   A score of 0 to 2 signifies little or no respiratory distress, 3 signifies mild distress, scores 4 to 6 indicate moderate distress, and scores greater than 7 signify severe distress  [email or web address]://[email or web address]/sites/default/files/PDFS/182335-fivogynymdt-lwjifiwd-olryqaczpop-dltma.pdf    Anxiety:                             [x ]None[ ]Mild [ ]Moderate [ ]Severe   Fatigue:                             [ x]None[ ]Mild [ ]Moderate [ ]Severe   Nausea:                             [ x]None[ ]Mild [ ]Moderate [ ]Severe   Loss of appetite:              [x ]None[ ]Mild [ ]Moderate [ ]Severe   Constipation:                    [x ]None[ ]Mild [ ]Moderate [ ]Severe    Other Symptoms:  [ x]All other review of systems negative     Palliative Performance Status Version 2:         30%    http://npcrc.org/files/news/palliative_performance_scale_ppsv2.pdf    PHYSICAL EXAM:  Vital Signs Last 24 Hrs  T(C): 37.2 (2025 07:34), Max: 37.2 (2025 07:34)  T(F): 98.9 (2025 07:34), Max: 98.9 (2025 07:34)  HR: 63 (2025 07:34) (56 - 63)  BP: 175/99 (2025 07:34) (157/85 - 189/90)  BP(mean): --  RR: 18 (2025 07:34) (18 - 19)  SpO2: 96% (:34) (96% - 98%)    Parameters below as of 2025 07:34  Patient On (Oxygen Delivery Method): room air     I&O's Summary    2025 07:01  -  2025 07:00  --------------------------------------------------------  IN: 0 mL / OUT: 200 mL / NET: -200 mL        GENERAL:  [ ] No acute distress [ ]Lethargic  [ ]Unarousable  [x ]Verbal  [ ]Non-Verbal [ ]Cachexia    BEHAVIORAL/PSYCH:  [ ]Alert and Oriented x  [ ] Anxiety [ ] Delirium [ ] Agitation [ ] Calm   EYES: [ ] No scleral icterus [ ] Scleral icterus [ ] Closed  ENMT:  [ ]Dry mouth  [x ]No external oral lesions [ ] No external ear or nose lesions  CARDIOVASCULAR:  [ ]Regular [ ]Irregular [ ]Tachy [ ]Not Tachy  [ ]Rip [ ] Edema [ ] No edema  PULMONARY:  [ ]Tachypnea  [ ]Audible excessive secretions [x ] No labored breathing [ ] labored breathing  GASTROINTESTINAL: [ ]Soft  [ ]Distended  [ ]Not distended [ ]Non tender [ ]Tender  MUSCULOSKELETAL: [ ]No clubbing [ ] clubbing  [ ] No cyanosis [ ] cyanosis  NEUROLOGIC: [ ]No focal deficits  [x ]Follows commands  [ ]Does not follow commands  [ ]Cognitive impairment  [ ]Dysphagia  [ ]Dysarthria  [ ]Paresis   SKIN: [ ] Jaundiced [ x] Non-jaundiced [ ]Rash [ ]No Rash [ ] Warm [ ] Dry  MISC/LINES: [ ] ET tube [ ] Trach [ ]NGT/OGT [ ]PEG [ ]Ewing    LABS: reviewed by me                        15.5   8.61  )-----------( 136      ( 2025 12:40 )             44.8       142  |  106  |  15  ----------------------------<  141[H]  4.3   |  26  |  1.1    Ca    8.7      2025 12:40    TPro  5.8[L]  /  Alb  3.7  /  TBili  1.1  /  DBili  x   /  AST  21  /  ALT  <5  /  AlkPhos  73        Urinalysis Basic - ( 2025 15:23 )    Color: Dark Yellow / Appearance: Clear / S.029 / pH: x  Gluc: x / Ketone: 40 mg/dL  / Bili: Small / Urobili: 1.0 mg/dL   Blood: x / Protein: 100 mg/dL / Nitrite: Negative   Leuk Esterase: Trace / RBC: 8 /HPF / WBC 4 /HPF   Sq Epi: x / Non Sq Epi: 1 /HPF / Bacteria: Negative /HPF      RADIOLOGY & ADDITIONAL STUDIES: reviewed by me    < from: Xray Pelvis AP only (25 @ 12:38) >  impression:    Degenerative changes of the lower lumbar spine.    Proliferative changes of the pelvis.    No acute displaced fracture.    < end of copied text >      EKG: reviewed by me    < from: 12 Lead ECG (25 @ 13:55) >  Ventricular Rate 68 BPM    Atrial Rate 68 BPM    P-R Interval 178 ms    QRS Duration 80 ms    Q-T Interval 394 ms    QTC Calculation(Bazett) 418 ms    P Axis 61 degrees    R Axis -30 degrees    T Axis 17 degrees    Diagnosis Line Normal sinus rhythm  Left axis deviation  Anterior infarct , age undetermined  Abnormal ECG    < end of copied text >      PROTEIN CALORIE MALNUTRITION PRESENT: [ ]mild [ ]moderate [ ]severe [ ]underweight [ ]morbid obesity  https://www.andeal.org/vault/6951/web/files/ONC/Table_Clinical%20Characteristics%20to%20Document%20Malnutrition-White%20JV%20et%20al%2020.pdf    Height (cm): 172.7 (24 @ 22:04), 172.7 (24 @ 06:30), 172.7 (04-10-24 @ 14:07)  Weight (kg): 69.9 (25 @ 11:16), 66 (24 @ 22:04), 71.2 (24 @ 06:30)  BMI (kg/m2): 23.4 (25 @ 11:16), 22.1 (24 @ 22:04), 23.9 (24 @ 06:30)  [ ]PPSV2 < or = to 30% [ ]significant weight loss  [ ]poor nutritional intake  [ ]anasarca      [ ]Artificial Nutrition      Palliative Care Spiritual/Emotional Screening Tool Question  Severity (0-4):                    OR                    [ x] Unable to determine. Will assess at later time if appropriate.  Score of 2 or greater indicates recommendation of Chaplaincy and/or SW referral  Chaplaincy Referral: [ ] Yes [ ] Refused [ ] Following     Caregiver Benavides:  [ ] Yes [ ] No    OR    [x ] Unable to determine. Will assess at later time if appropriate.  Social Work Referral [ ]  Patient and Family Centered Care Referral [ ]    Anticipatory Grief Present: [ ] Yes [ ] No    OR     [ x] Unable to determine. Will assess at later time if appropriate.  Social Work Referral [ ]  Patient and Family Centered Care Referral [ ]    Patient discussed with primary medical team MD  Palliative care education provided to patient and/or family  
Patient is a 74y old  Male who presents with a chief complaint of Recurrent witnessed falls, AMS (22 Jan 2025 12:09)    HPI:  73 y/o M with a PMHx of Parkinson's disease, HTN, hx ischemic stroke, DM II, dementia and chronic back pain, Hx of seizures on Keppra, BIBA from [facility] for generalized weakness, confusion, decreased PO intake and multiple witnessed falls over the past few days. Admitted to medicine for AMS, Neurology was consulted for AMS despite lack of metabolic derangements. From information obtained from both patient and collateral, patient has had a recent decline in mental status including expressive aphasia, impaired short term memory and confusion for the past couple of months. Evaluated by general neurology on 1/21 and noted to have R facial droop, brisk R patellar reflex, positive R babinski reflex, a trembling in his lower left lip, and a left-handed resting tremor with a postural component. Apparently, R facial droop was first noted 1/20 by collateral/primary contact. CTH on 1/15 negative. MRI brain non con obtained overnight showing acute L corona radiata infarct. Patient showing new significant R tongue deviation this AM. Transferred to stroke team  for further workup and management.     EP was consulted for a loop recorder implant evaluation for a long term cardiac monitoring to r/o arrhythmogenic causes of CVA.     REVIEW OF SYSTEMS  [ ] A ten-point review of systems was otherwise negative except as noted.  [x] Due to altered mental status/intubation, subjective information were not able to be obtained from the patient. History was obtained, to the extent possible, from review of the chart and collateral sources of information.      PAST MEDICAL & SURGICAL HISTORY:  Parkinson disease  CVA (cerebral vascular accident)  HTN (hypertension)  Diabetes mellitus  Gout  History of appendectomy      Home Medications:  acetaminophen 325 mg oral tablet: 2 tab(s) orally 3 times a day as needed for Mild Pain (1 - 3) (17 Jan 2025 04:38)  carbidopa-levodopa 50 mg-200 mg oral tablet, extended release: 1 tab(s) orally once a day (at bedtime) (24 Sep 2024 23:03)  Coreg 6.25 mg oral tablet: 1 tab(s) orally 2 times a day (17 Jan 2025 04:45)  Melatonin 3 mg oral tablet: 1 tab(s) orally once a day (at bedtime) (24 Sep 2024 23:06)  mirtazapine 7.5 mg oral tablet: 2 tab(s) orally once a day (at bedtime) (17 Jan 2025 04:44)  polyethylene glycol 3350 oral powder for reconstitution: 17 gram(s) orally 3 times a week (24 Sep 2024 23:07)  traZODone 100 mg oral tablet: orally once a day (at bedtime) (24 Sep 2024 23:09)      Allergies:  sulfa drugs (Unknown)  cefaclor (Unknown)  penicillin (Unknown)      FAMILY HISTORY:  FH: hypertension    FH: CAD (coronary artery disease)    SOCIAL HISTORY:    CIGARETTES:  ALCOHOL:  MARIJUANA:  ILLICIT DRUGS:        PREVIOUS DIAGNOSTIC TESTING:      ECHO FINDINGS:  Summary:   1. Normal global left ventricular systolic function with a biplane EF of   69%. Normal diastolic function. No regional wall motion abnormalities.   2. Normal right ventricular size and function.   3. Normal atria.   4. Thickening and calcification of the anterior and posterior mitral   valve leaflets.   5. Sclerotic aortic valve with decreased opening.   6. Adequate TR velocity was not obtained to accurately assess RVSP.   7. There is no evidence of pericardial effusion.    STRESS  FINDINGS:    CATHETERIZATION  FINDINGS:    ELECTROPHYSIOLOGY STUDY  FINDINGS:    CAROTID ULTRASOUND:  FINDINGS    VENOUS DUPLEX SCAN:  FINDINGS:    CHEST CT PULMONARY ANGIO with IV Contrast:  FINDINGS:      MEDICATIONS  (STANDING):  amLODIPine   Tablet 5 milliGRAM(s) Oral daily  aspirin  chewable 81 milliGRAM(s) Oral daily  atorvastatin 40 milliGRAM(s) Oral at bedtime  carbidopa/levodopa  25/100 2 Tablet(s) Oral three times a day  carbidopa/levodopa CR 50/200 1 Tablet(s) Oral <User Schedule>  carvedilol 3.125 milliGRAM(s) Oral every 12 hours  chlorhexidine 2% Cloths 1 Application(s) Topical <User Schedule>  clopidogrel Tablet 75 milliGRAM(s) Oral daily  dextrose 5%. 1000 milliLiter(s) (100 mL/Hr) IV Continuous <Continuous>  dextrose 5%. 1000 milliLiter(s) (50 mL/Hr) IV Continuous <Continuous>  dextrose 50% Injectable 25 Gram(s) IV Push once  dextrose 50% Injectable 12.5 Gram(s) IV Push once  dextrose 50% Injectable 25 Gram(s) IV Push once  enoxaparin Injectable 40 milliGRAM(s) SubCutaneous every 24 hours  entacapone 200 milliGRAM(s) Oral three times a day  gabapentin 100 milliGRAM(s) Oral at bedtime  glucagon  Injectable 1 milliGRAM(s) IntraMuscular once  insulin lispro (ADMELOG) corrective regimen sliding scale   SubCutaneous three times a day before meals  levETIRAcetam 500 milliGRAM(s) Oral two times a day  mirtazapine 7.5 milliGRAM(s) Oral daily  polyethylene glycol 3350 17 Gram(s) Oral daily  rivastigmine patch  9.5 mG/24 Hr(s) 1 Patch Transdermal every 24 hours  senna 2 Tablet(s) Oral at bedtime  traZODone 100 milliGRAM(s) Oral at bedtime    MEDICATIONS  (PRN):  acetaminophen     Tablet .. 650 milliGRAM(s) Oral every 6 hours PRN Temp greater or equal to 38C (100.4F), Mild Pain (1 - 3)  dextrose Oral Gel 15 Gram(s) Oral once PRN Blood Glucose LESS THAN 70 milliGRAM(s)/deciliter  melatonin 3 milliGRAM(s) Oral at bedtime PRN Insomnia      Vital Signs Last 24 Hrs  T(C): 36.6 (22 Jan 2025 12:21), Max: 36.9 (21 Jan 2025 20:58)  T(F): 97.8 (22 Jan 2025 12:21), Max: 98.4 (21 Jan 2025 20:58)  HR: 85 (22 Jan 2025 12:21) (55 - 85)  BP: 147/83 (22 Jan 2025 12:21) (146/89 - 162/91)  BP(mean): 104 (22 Jan 2025 12:21) (104 - 115)  RR: 17 (22 Jan 2025 12:21) (17 - 18)  SpO2: 97% (22 Jan 2025 12:21) (96% - 98%)      PHYSICAL EXAM:  GENERAL: In no apparent distress, well nourished, and hydrated.  HEART: Regular rate and rhythm; No murmurs, rubs, or gallops.  PULMONARY: Clear to auscultation and perfusion.  No rales, wheezing, or rhonchi bilaterally.  NEUROLOGICAL: Grossly nonfocal        RADIOLOGY & ADDITIONAL STUDIES:  MRI brain:  IMPRESSION:  1.  Acute white matter infarct left corona radiata  2.  Moderate microvascular ischemic changes.    
HPI:  74M PMH of Parkinson's, HTN, hx ischemic CVA x2, type 2 DM, dementia and chronic back pain, Hx of seizures on keppra BIBA from [facility]  or generalized weakness, confusion, decreased PO intake and multiple witnessed falls over the past few days. He had two witnessed falls the day prior to admission where he was weak and slumped to the ground. No LOC, not on AC. Per staff, he is normally alert and oriented x 3 and able to perform ADLs, but for the past few days has been increasingly confused. He was evaluated in the ED the day prior for similar complaints, had a negative work up and was discharged.  The Patient is A+Ox2-3 on interview, poor historian, cannot recollect the events that occurred, does endorse pain around the left eye.   Denies HA, f/c, CP, SOB, abdominal pain, dysuria, n/v/diarrhea, blood in stool or urine  Tried reaching [facility] for further information with no answer.       · BP Systolic 161 mm Hg  · BP Diastolic 96 mm Hg  · Heart Rate 78 /min  · Respiration Rate (breaths/min) 18 /min  · Temp (F) 98.2 Degrees F  · SpO2 (%) 98 % on RA     Labs: CBC, CMP wnl, trop 11 --> 9  UA negative  CTH (1/15): No acute intracranial pathology. No evidence of midline shift, mass effect or intracranial hemorrhage. Moderate chronic microvascular type changes as well as multiple chronic lacunar infarcts, detailed above.    CXR: No radiographic evidence of acute cardiopulmonary disease.  xray pelvis: Degenerative changes of the lower lumbar spine. Proliferative changes of the pelvis. No acute displaced fracture.    #Recurrent Falls  #Weakness  #Poor PO intake  #Parkinson's   #Dementia   - likely 2/2 Parkinson's and dementia   - Trauma w/up: CTH (1/15) neg, CXR negative, xray pelvis no acute fracture  - Infectious workup: SIRS negative, afebrile, no elevated WBC, CXR wnl, UA negative   - May need repeat CTH if mental status continues to be different from baseline   - monitor on tele       PAST MEDICAL & SURGICAL HISTORY:  Parkinson disease      CVA (cerebral vascular accident)      HTN (hypertension)      Diabetes mellitus      Gout      History of appendectomy          Hospital Course:    TODAY'S SUBJECTIVE & REVIEW OF SYMPTOMS:     Constitutional WNL   Cardio WNL   Resp WNL   GI WNL  Heme WNL  Endo WNL  Skin WNL  MSK Weakness   Neuro WNL  Cognitive AMS   Psych WNL      MEDICATIONS  (STANDING):  aspirin  chewable 81 milliGRAM(s) Oral daily  carbidopa/levodopa  25/100 2 Tablet(s) Oral three times a day  carbidopa/levodopa CR 50/200 1 Tablet(s) Oral <User Schedule>  carvedilol 6.25 milliGRAM(s) Oral every 12 hours  dextrose 5%. 1000 milliLiter(s) (50 mL/Hr) IV Continuous <Continuous>  dextrose 5%. 1000 milliLiter(s) (100 mL/Hr) IV Continuous <Continuous>  dextrose 50% Injectable 25 Gram(s) IV Push once  dextrose 50% Injectable 12.5 Gram(s) IV Push once  dextrose 50% Injectable 25 Gram(s) IV Push once  enoxaparin Injectable 40 milliGRAM(s) SubCutaneous every 24 hours  entacapone 200 milliGRAM(s) Oral three times a day  gabapentin 100 milliGRAM(s) Oral at bedtime  glucagon  Injectable 1 milliGRAM(s) IntraMuscular once  insulin lispro (ADMELOG) corrective regimen sliding scale   SubCutaneous three times a day before meals  lactulose Syrup 20 Gram(s) Oral once  levETIRAcetam 500 milliGRAM(s) Oral two times a day  mirtazapine 7.5 milliGRAM(s) Oral daily  polyethylene glycol 3350 17 Gram(s) Oral daily  rivastigmine patch  9.5 mG/24 Hr(s) 1 Patch Transdermal every 24 hours  senna 2 Tablet(s) Oral at bedtime  traZODone 100 milliGRAM(s) Oral at bedtime    MEDICATIONS  (PRN):  acetaminophen     Tablet .. 650 milliGRAM(s) Oral every 6 hours PRN Temp greater or equal to 38C (100.4F), Mild Pain (1 - 3)  dextrose Oral Gel 15 Gram(s) Oral once PRN Blood Glucose LESS THAN 70 milliGRAM(s)/deciliter  melatonin 3 milliGRAM(s) Oral at bedtime PRN Insomnia      FAMILY HISTORY:  FH: hypertension    FH: CAD (coronary artery disease)        Allergies    sulfa drugs (Unknown)  cefaclor (Unknown)  penicillin (Unknown)    Intolerances        SOCIAL HISTORY:    [  ] Etoh  [  ] Smoking  [  ] Substance abuse     Home Environment:  [   ] Home Alone  [   ] Lives with Family  [   ] Home Health Aid    Dwelling:  [   ] Apartment  [   ] Private House  [ x  ] Assisted living facility   [   ] Skilled Nursing Facility      [   ] Short Term  [   ] Long Term  [   ] Stairs       Elevator [   ]    FUNCTIONAL STATUS PTA: (Check all that apply)  Ambulation: [  x  ]Independent    [   ] Dependent     [   ] Non-Ambulatory  Assistive Device: [   ] SA Cane  [   ]  Q Cane  [   ] Walker  [   ]  Wheelchair  ADL : [ x  ] Independent  [    ]  Dependent       Vital Signs Last 24 Hrs  T(C): 37.2 (17 Jan 2025 07:34), Max: 37.2 (17 Jan 2025 07:34)  T(F): 98.9 (17 Jan 2025 07:34), Max: 98.9 (17 Jan 2025 07:34)  HR: 63 (17 Jan 2025 07:34) (56 - 63)  BP: 175/99 (17 Jan 2025 07:34) (157/85 - 189/90)  BP(mean): --  RR: 18 (17 Jan 2025 07:34) (18 - 19)  SpO2: 96% (17 Jan 2025 07:34) (96% - 97%)    Parameters below as of 17 Jan 2025 07:34  Patient On (Oxygen Delivery Method): room air          PHYSICAL EXAM: Awake / confused   GENERAL: NAD  HEAD:  Normocephalic  CHEST/LUNG: Clear   HEART: S1S2+  ABDOMEN: Soft, Nontender  EXTREMITIES:  no calf tenderness    NERVOUS SYSTEM:  Cranial Nerves 2-12 intact [   ] Abnormal  [   ]  ROM: WFL all extremities [ x  ]  Abnormal [   ]  Motor Strength: WFL all extremities  [ x  ]  Abnormal [   ]  Sensation: intact to light touch [ x  ] Abnormal [   ]    FUNCTIONAL STATUS:  Bed Mobility: Independent [   ]  Supervision [   ]  Needs Assistance [ x  ]  N/A [   ]  Transfers: Independent [   ]  Supervision [   ]  Needs Assistance [   ]  N/A [   ]   Ambulation: Independent [   ]  Supervision [   ]  Needs Assistance [   ]  N/A [   ]  ADL: Independent [   ] Requires Assistance [   ] N/A [   ]      LABS:                        15.3   7.87  )-----------( 125      ( 17 Jan 2025 11:00 )             44.8     01-17    142  |  105  |  11  ----------------------------<  143[H]  4.3   |  26  |  0.9    Ca    8.9      17 Jan 2025 11:00  Mg     1.8     01-17    TPro  6.1  /  Alb  3.9  /  TBili  1.0  /  DBili  x   /  AST  18  /  ALT  5   /  AlkPhos  76  01-17      Urinalysis Basic - ( 17 Jan 2025 11:00 )    Color: x / Appearance: x / SG: x / pH: x  Gluc: 143 mg/dL / Ketone: x  / Bili: x / Urobili: x   Blood: x / Protein: x / Nitrite: x   Leuk Esterase: x / RBC: x / WBC x   Sq Epi: x / Non Sq Epi: x / Bacteria: x        RADIOLOGY & ADDITIONAL STUDIES:

## 2025-01-22 NOTE — PROGRESS NOTE ADULT - ASSESSMENT
74 year old Male, PMH of Parkinson's, HTN, hx ischemic CVA x2, type 2 DM, dementia and chronic back pain, Hx of seizures on keppra, BIBA from [facility] facility  or generalized weakness, confusion, decreased PO intake and multiple witnessed falls over the past few days.    #Acute ischemic stroke  #Recurrent falls associated w/ confusion weakness  #Expressive aphasia  #Hx parkinson's disease   #Hx dementia  #Hx ischemic CVA x2  #Hx seizure disorder   #Lt tongue deviation  - Trauma w/u negative: CTH, CXR, Xray pelvis   - Infectious w/u negative: afebrile, no leukocytosis, UA(-), trop 11> 9  - orthostatics: negative   - TSH, vit D wnl  - c/w home meds: carbidopa/levodopa, entacapone, rivastigmine patch (submitted nonformulary- f/u if accepted)   -c/w keppra 500mg bid  - c/w gabapentin 100mg qhs   - c/w mirtazapine 7.5mg qd   - c/w trazodone 100mg qhs  - EEG: Findings consistent with mild diffuse electrocerebral dysfunction secondary to nonspecific etiology  - Echo: EF 69%, wnl  - remote telemetry due to stroke  - MRI showing acute stroke of L corona radiata  - neuro following  - f/u CTA head and neck  - started high dose statin  - c/w aspirin, added plavix  - LDL, TSH, A1C wnl, CTA H&N  - EP for loop recorder  - Speech language pathology evaluated - mod to severe dysarthria, consistent with PD  - S&S: thin liquid; easy to chew; regular solid  - f/u PT/OT   - q4 stroke neuro checks and vitals  - SBP goal 120-160    #HTN  #DM2  - decreased coreg to 3.25mg BID   - c/w amlodipine 5mg qd for now  - A1C 4.9  - Monitor   - FS TIDAC qhs, ISS       ---------------------  DVT ppx: lovenox  Diet: soft and bite sized, CC w/ evening snack  GI ppx/Bowel regimen: miralax, senna   Activity: IAT  GOC: full    Pending: CTA head and neck, LDL, TSH, A1C, EP for loop recorder, PT/OT   Accepted to stroke service and will be transferred to stroke unit.    My note supersedes the residents note should a discrepancy arise.    Chart and notes personally reviewed.  Care Discussed with Consultants/Other Providers/ Housestaff [ x] YES [ ] NO   Radiology, labs, old records personally reviewed.    discussed w/ housestaff, nursing, case management, neuro    Attestation Statements:    Attestation Statements:  Risk Statement (NON-critical care).     On this date of service, level of risk to patient is considered: High.     Due to: pt with illness causing risk to life or bodily fxn    Time-based billing (NON-critical care).     50 minutes spent on total encounter. The necessity of the time spent during the encounter on this date of service was due to:     time spent on review of labs, imaging studies, old records, obtaining history, personally examining patient, counselling and communicating with patient/ family, entering orders for medications/tests/etc, discussions with other health care providers, documentation in electronic health records, independent interpretation of labs, imaging/procedure results and care coordination.

## 2025-01-22 NOTE — CHART NOTE - NSCHARTNOTEFT_GEN_A_CORE
Informed by bedside nurse that patient was found bottom down on the floor by his bed, fall unwitnessed. Examined patient for any external injuries as well as re-evaluated NIHSS which is currently stable. Vitals currently stable. Patient himself states he was trying to walk on his own and fell on the ground but did not hit his head and remembers the event. Urgent CTH ordered as a precaution despite patients denial that he hit his head as patient axox1 at baseline with dementia. Will f/u results of CTH and endorse events to night team.

## 2025-01-22 NOTE — CONSULT NOTE ADULT - ASSESSMENT
73 y/o M with a PMHx of Parkinson's disease, HTN, hx ischemic stroke, DM II, dementia and chronic back pain, Hx of seizures on Keppra, BIBA from [facility] for generalized weakness, confusion, decreased PO intake and multiple witnessed falls over the past few days. Admitted to medicine for AMS, Neurology was consulted for AMS despite lack of metabolic derangements. From information obtained from both patient and collateral, patient has had a recent decline in mental status including expressive aphasia, impaired short term memory and confusion for the past couple of months. Evaluated by general neurology on 1/21 and noted to have R facial droop, brisk R patellar reflex, positive R babinski reflex, a trembling in his lower left lip, and a left-handed resting tremor with a postural component. Apparently, R facial droop was first noted 1/20 by collateral/primary contact. CTH on 1/15 negative. MRI brain non con obtained overnight showing acute L corona radiata infarct. Patient showing new significant R tongue deviation this AM. Transferred to stroke team  for further workup and management.     EP was consulted for a loop recorder implant evaluation for a long term cardiac monitoring to r/o arrhythmogenic causes of CVA.     - cryptogenic stroke  - Parkinson's disease  - HTN  - DM II  - dementia  - h/o seizures    EKG reviewed, no arrhythmia.  TTE noted, unremarkable  TSH-2.81  ILR is recommended for the patient for a long term cardiac monitoring to r/o arrhythmogenic causes  Patient is agreeable' however, alert and oriented x 2. Emergency contact was called x 2 to obtain the consent, no answer.

## 2025-01-22 NOTE — SPEECH LANGUAGE PATHOLOGY EVALUATION - COMMENTS
c/o word finding deficits mild receptive deficits mod to severe dysarthria, consistent with PD moderate expressive language deficits

## 2025-01-22 NOTE — CONSULT NOTE ADULT - CONSULT REASON
AMS w/ no clear cause, now with expressive aphasia, consult for MRI vs possible PD med adjustment
fall
CVA
GOC

## 2025-01-22 NOTE — PROGRESS NOTE ADULT - SUBJECTIVE AND OBJECTIVE BOX
CC: generalized weakness, confusion, decreased PO intake and multiple falls    HPI:  74M PMH of Parkinson's, HTN, hx ischemic CVA x2, type 2 DM, dementia and chronic back pain, Hx of seizures on keppra BIBA from [facility] facility  or generalized weakness, confusion, decreased PO intake and multiple witnessed falls over the past few days. He had two witnessed falls the day prior to admission where he was weak and slumped to the ground. No LOC, not on AC. Per staff, he is normally alert and oriented x 3 and able to perform ADLs, but for the past few days has been increasingly confused. He was evaluated in the ED the day prior for similar complaints, had a negative work up and was discharged.    Interval history  -Patient with no acute stroke over weekend  -Seen at bedside with some word finding difficulty     ADVANCE DIRECTIVES:     [x ] Full Code [ ] DNR  MOLST  [ ]  Living Will  [ ]   DECISION MAKER(s):  [ x] Health Care Proxy(s)  [ ] Surrogate(s)  [ ] Guardian           Name(s): Phone Number(s):  Shanon Aguila      BASELINE (I)ADL(s) (prior to admission):    [place]: [ ]Total  [ ] Moderate [ ]Dependent  Palliative Performance Status Version 2:         %    http://npcrc.org/files/news/palliative_performance_scale_ppsv2.pdf    Allergies    sulfa drugs (Unknown)  cefaclor (Unknown)  penicillin (Unknown)    Intolerances    MEDICATIONS  (STANDING):  amLODIPine   Tablet 5 milliGRAM(s) Oral daily  aspirin  chewable 81 milliGRAM(s) Oral daily  atorvastatin 40 milliGRAM(s) Oral at bedtime  carbidopa/levodopa  25/100 2 Tablet(s) Oral three times a day  carbidopa/levodopa CR 50/200 1 Tablet(s) Oral <User Schedule>  carvedilol 3.125 milliGRAM(s) Oral every 12 hours  chlorhexidine 2% Cloths 1 Application(s) Topical <User Schedule>  clopidogrel Tablet 75 milliGRAM(s) Oral daily  dextrose 5%. 1000 milliLiter(s) (100 mL/Hr) IV Continuous <Continuous>  dextrose 5%. 1000 milliLiter(s) (50 mL/Hr) IV Continuous <Continuous>  dextrose 50% Injectable 25 Gram(s) IV Push once  dextrose 50% Injectable 12.5 Gram(s) IV Push once  dextrose 50% Injectable 25 Gram(s) IV Push once  enoxaparin Injectable 40 milliGRAM(s) SubCutaneous every 24 hours  entacapone 200 milliGRAM(s) Oral three times a day  gabapentin 100 milliGRAM(s) Oral at bedtime  glucagon  Injectable 1 milliGRAM(s) IntraMuscular once  insulin lispro (ADMELOG) corrective regimen sliding scale   SubCutaneous three times a day before meals  levETIRAcetam 500 milliGRAM(s) Oral two times a day  mirtazapine 7.5 milliGRAM(s) Oral daily  polyethylene glycol 3350 17 Gram(s) Oral daily  rivastigmine patch  9.5 mG/24 Hr(s) 1 Patch Transdermal every 24 hours  senna 2 Tablet(s) Oral at bedtime  traZODone 100 milliGRAM(s) Oral at bedtime    MEDICATIONS  (PRN):  acetaminophen     Tablet .. 650 milliGRAM(s) Oral every 6 hours PRN Temp greater or equal to 38C (100.4F), Mild Pain (1 - 3)  dextrose Oral Gel 15 Gram(s) Oral once PRN Blood Glucose LESS THAN 70 milliGRAM(s)/deciliter  melatonin 3 milliGRAM(s) Oral at bedtime PRN Insomnia      PRESENT SYMPTOMS: [ ]Unable to obtain due to poor mentation   Source if other than patient:  [ ]Family   [ ]Team     Pain: [ ]yes [ x]no  ALL PAIN ASSESSMENT COMPONENTS WERE ASKED ABOUT UNLESS OTHERWISE NOTED  QOL impact -   Location -                    Aggravating factors -  Quality -  Radiation -  Timing-  Severity (0-10 scale):  Minimal acceptable level (0-10 scale):     CPOT:    https://www.sccm.org/getattachment/dsh50n38-8a4b-1p5g-9e4y-0067s8629o4h/Critical-Care-Pain-Observation-Tool-(CPOT)    PAIN AD Score:   http://geriatrictoolkit.[place].[email or web address]/painad.pdf (press ctrl +  left click to view)    Dyspnea:                           [ x]None[ ]Mild [ ]Moderate [ ]Severe     Respiratory Distress Observation Scale (RDOS):   A score of 0 to 2 signifies little or no respiratory distress, 3 signifies mild distress, scores 4 to 6 indicate moderate distress, and scores greater than 7 signify severe distress  https://[email or web address]/sites/default/files/PDFS/307056-fxcyommcxvz-imhcgddl-jxsknywbuvw-qztha.pdf    Anxiety:                             [x ]None[ ]Mild [ ]Moderate [ ]Severe   Fatigue:                             [ x]None[ ]Mild [ ]Moderate [ ]Severe   Nausea:                             [ x]None[ ]Mild [ ]Moderate [ ]Severe   Loss of appetite:              [x ]None[ ]Mild [ ]Moderate [ ]Severe   Constipation:                    [x ]None[ ]Mild [ ]Moderate [ ]Severe    Other Symptoms:  [ x]All other review of systems negative     Palliative Performance Status Version 2:         30%    http://npc.org/files/news/palliative_performance_scale_ppsv2.pdf    PHYSICAL EXAM:  Vital Signs Last 24 Hrs  T(C): 36.8 (22 Jan 2025 21:12), Max: 36.8 (22 Jan 2025 17:40)  T(F): 98.2 (22 Jan 2025 21:12), Max: 98.2 (22 Jan 2025 17:40)  HR: 65 (22 Jan 2025 21:12) (55 - 86)  BP: 151/88 (22 Jan 2025 21:12) (146/89 - 163/101)  BP(mean): 109 (22 Jan 2025 21:12) (104 - 118)  RR: 18 (22 Jan 2025 21:12) (17 - 18)  SpO2: 96% (22 Jan 2025 21:12) (96% - 99%)    Parameters below as of 22 Jan 2025 21:12  Patient On (Oxygen Delivery Method): room air        GENERAL:  [ ] No acute distress [ ]Lethargic  [ ]Unarousable  [x ]Verbal  [ ]Non-Verbal [ ]Cachexia    BEHAVIORAL/PSYCH:  [ ]Alert and Oriented x  [ ] Anxiety [ ] Delirium [ ] Agitation [ ] Calm   EYES: [ ] No scleral icterus [ ] Scleral icterus [ ] Closed  ENMT:  [ ]Dry mouth  [x ]No external oral lesions [ ] No external ear or nose lesions  CARDIOVASCULAR:  [ ]Regular [ ]Irregular [ ]Tachy [ ]Not Tachy  [ ]Rip [ ] Edema [ ] No edema  PULMONARY:  [ ]Tachypnea  [ ]Audible excessive secretions [x ] No labored breathing [ ] labored breathing  GASTROINTESTINAL: [ ]Soft  [ ]Distended  [ ]Not distended [ ]Non tender [ ]Tender  MUSCULOSKELETAL: [ ]No clubbing [ ] clubbing  [ ] No cyanosis [ ] cyanosis  NEUROLOGIC: [ ]No focal deficits  [x ]Follows commands  [ ]Does not follow commands  [ ]Cognitive impairment  [ ]Dysphagia  [ ]Dysarthria  [ ]Paresis  [x]aphasia  SKIN: [ ] Jaundiced [ x] Non-jaundiced [ ]Rash [ ]No Rash [ ] Warm [ ] Dry  MISC/LINES: [ ] ET tube [ ] Trach [ ]NGT/OGT [ ]PEG [ ]Ewing    LABS: reviewed by me                                               CAPILLARY BLOOD GLUCOSE      POCT Blood Glucose.: 110 mg/dL (22 Jan 2025 17:07)              RADIOLOGY & ADDITIONAL STUDIES: reviewed by me    < from: MR Head No Cont (01.21.25 @ 17:20) >  IMPRESSION:    1.  Acute white matter infarct left corona radiata    2.  Moderate microvascular ischemic changes.    < end of copied text >        EKG: reviewed by me    < from: 12 Lead ECG (01.16.25 @ 13:55) >  Ventricular Rate 68 BPM    Atrial Rate 68 BPM    P-R Interval 178 ms    QRS Duration 80 ms    Q-T Interval 394 ms    QTC Calculation(Bazett) 418 ms    P Axis 61 degrees    R Axis -30 degrees    T Axis 17 degrees    Diagnosis Line Normal sinus rhythm  Left axis deviation  Anterior infarct , age undetermined  Abnormal ECG    < end of copied text >        PROTEIN CALORIE MALNUTRITION PRESENT: [ ]mild [ ]moderate [ ]severe [ ]underweight [ ]morbid obesity  https://www.andeal.org/vault/2440/web/files/ONC/Table_Clinical%20Characteristics%20to%20Document%20Malnutrition-White%20JV%20et%20al%095427.pdf    Height (cm): 172.7 (09-24-24 @ 22:04), 172.7 (04-18-24 @ 06:30), 172.7 (04-10-24 @ 14:07)  Weight (kg): 69.9 (01-16-25 @ 11:16), 66 (09-24-24 @ 22:04), 71.2 (04-18-24 @ 06:30)  BMI (kg/m2): 23.4 (01-16-25 @ 11:16), 22.1 (09-24-24 @ 22:04), 23.9 (04-18-24 @ 06:30)  [ ]PPSV2 < or = to 30% [ ]significant weight loss  [ ]poor nutritional intake  [ ]anasarca      [ ]Artificial Nutrition      Palliative Care Spiritual/Emotional Screening Tool Question  Severity (0-4):                    OR                    [ x] Unable to determine. Will assess at later time if appropriate.  Score of 2 or greater indicates recommendation of Chaplaincy and/or SW referral  Chaplaincy Referral: [ ] Yes [ ] Refused [ ] Following     Caregiver [place]:  [ ] Yes [ ] No    OR    [x ] Unable to determine. Will assess at later time if appropriate.  Social Work Referral [ ]  Patient and Family Centered Care Referral [ ]    Anticipatory Grief Present: [ ] Yes [ ] No    OR     [ x] Unable to determine. Will assess at later time if appropriate.  Social Work Referral [ ]  Patient and Family Centered Care Referral [ ]    Patient discussed with primary medical team MD  Palliative care education provided to patient and/or family

## 2025-01-22 NOTE — SWALLOW BEDSIDE ASSESSMENT ADULT - SLP PERTINENT HISTORY OF CURRENT PROBLEM
75 y/o M with a PMHx of Parkinson's disease, HTN, hx ischemic stroke, DM II, dementia and chronic back pain, Hx of seizures on Keppra, BIBA from [facility] facility for generalized weakness, confusion, decreased PO intake and multiple witnessed falls over the past few days. Admitted to medicine for AMS, Evaluated by general neurology on 1/21 and noted to have R facial droop, brisk R patellar reflex, positive R babinski reflex, a trembling in his lower left lip, and a left-handed resting tremor with a postural component. Apparently, R facial droop was first noted yesterday 1/20 by collateral/primary contact. CTH on 1/15 negative. MRI brain non con obtained overnight showing acute L corona radiata infarct. Neurovascular to follow today.

## 2025-01-22 NOTE — PROGRESS NOTE ADULT - SUBJECTIVE AND OBJECTIVE BOX
Patient is a 74y old  Male who presents with a chief complaint of Recurrent witnessed falls, AMS (22 Jan 2025 15:22)    INTERVAL HPI/OVERNIGHT EVENTS: Patient was examined and seen at bedside. This morning pt is resting comfortably in bed and reports no new issues or overnight events. No complaints, feels well  ROS: Denies CP, SOB, AP, new weakness  All other systems reviewed and are within normal limits.  InitialHPI:  74M PMH of Parkinson's, HTN, hx ischemic CVA x2, type 2 DM, dementia and chronic back pain, Hx of seizures on keppra BIBA from [facility]  or generalized weakness, confusion, decreased PO intake and multiple witnessed falls over the past few days. He had two witnessed falls the day prior to admission where he was weak and slumped to the ground. No LOC, not on AC. Per staff, he is normally alert and oriented x 3 and able to perform ADLs, but for the past few days has been increasingly confused. He was evaluated in the ED the day prior for similar complaints, had a negative work up and was discharged.  The Patient is A+Ox2-3 on interview, poor historian, cannot recollect the events that occurred, does endorse pain around the left eye.   Denies HA, f/c, CP, SOB, abdominal pain, dysuria, n/v/diarrhea, blood in stool or urine  Tried reaching [facility] for further information with no answer.       · BP Systolic 161 mm Hg  · BP Diastolic 96 mm Hg  · Heart Rate 78 /min  · Respiration Rate (breaths/min) 18 /min  · Temp (F) 98.2 Degrees F  · SpO2 (%) 98 % on RA     Labs: CBC, CMP wnl, trop 11 --> 9  UA negative  CTH (1/15): No acute intracranial pathology. No evidence of midline shift, mass effect or intracranial hemorrhage. Moderate chronic microvascular type changes as well as multiple chronic lacunar infarcts, detailed above.    CXR: No radiographic evidence of acute cardiopulmonary disease.  xray pelvis: Degenerative changes of the lower lumbar spine. Proliferative changes of the pelvis. No acute displaced fracture.     (16 Jan 2025 21:57)    PAST MEDICAL & SURGICAL HISTORY:  Parkinson disease      CVA (cerebral vascular accident)      HTN (hypertension)      Diabetes mellitus      Gout      History of appendectomy          General: NAD, AAOx1+, Rt facial droop, Lt tongue deviation. Hypophonia. ?expressive aphasia  HEENT:  EOMI, no LAD  CV: S1 S2  Resp: decreased breath sounds at bases  GI: NT/ND/S +BS  MS: no clubbing/cyanosis/edema, + pulses b/l  Neuro: nonfocal, +reflexes thruout; LUE tremor    tele: SR, nonspecific changes (on my own evaluation of tele monitor)    MEDICATIONS  (STANDING):  amLODIPine   Tablet 5 milliGRAM(s) Oral daily  aspirin  chewable 81 milliGRAM(s) Oral daily  atorvastatin 40 milliGRAM(s) Oral at bedtime  carbidopa/levodopa  25/100 2 Tablet(s) Oral three times a day  carbidopa/levodopa CR 50/200 1 Tablet(s) Oral <User Schedule>  carvedilol 3.125 milliGRAM(s) Oral every 12 hours  chlorhexidine 2% Cloths 1 Application(s) Topical <User Schedule>  clopidogrel Tablet 75 milliGRAM(s) Oral daily  dextrose 5%. 1000 milliLiter(s) (100 mL/Hr) IV Continuous <Continuous>  dextrose 5%. 1000 milliLiter(s) (50 mL/Hr) IV Continuous <Continuous>  dextrose 50% Injectable 25 Gram(s) IV Push once  dextrose 50% Injectable 12.5 Gram(s) IV Push once  dextrose 50% Injectable 25 Gram(s) IV Push once  enoxaparin Injectable 40 milliGRAM(s) SubCutaneous every 24 hours  entacapone 200 milliGRAM(s) Oral three times a day  gabapentin 100 milliGRAM(s) Oral at bedtime  glucagon  Injectable 1 milliGRAM(s) IntraMuscular once  insulin lispro (ADMELOG) corrective regimen sliding scale   SubCutaneous three times a day before meals  levETIRAcetam 500 milliGRAM(s) Oral two times a day  mirtazapine 7.5 milliGRAM(s) Oral daily  polyethylene glycol 3350 17 Gram(s) Oral daily  rivastigmine patch  9.5 mG/24 Hr(s) 1 Patch Transdermal every 24 hours  senna 2 Tablet(s) Oral at bedtime  traZODone 100 milliGRAM(s) Oral at bedtime    MEDICATIONS  (PRN):  acetaminophen     Tablet .. 650 milliGRAM(s) Oral every 6 hours PRN Temp greater or equal to 38C (100.4F), Mild Pain (1 - 3)  dextrose Oral Gel 15 Gram(s) Oral once PRN Blood Glucose LESS THAN 70 milliGRAM(s)/deciliter  melatonin 3 milliGRAM(s) Oral at bedtime PRN Insomnia    Vital Signs Last 24 Hrs  T(C): 36.8 (22 Jan 2025 21:12), Max: 36.8 (22 Jan 2025 17:40)  T(F): 98.2 (22 Jan 2025 21:12), Max: 98.2 (22 Jan 2025 17:40)  HR: 65 (22 Jan 2025 21:12) (55 - 86)  BP: 151/88 (22 Jan 2025 21:12) (146/89 - 163/101)  BP(mean): 109 (22 Jan 2025 21:12) (104 - 118)  RR: 18 (22 Jan 2025 21:12) (17 - 18)  SpO2: 96% (22 Jan 2025 21:12) (96% - 99%)    Parameters below as of 22 Jan 2025 21:12  Patient On (Oxygen Delivery Method): room air      CAPILLARY BLOOD GLUCOSE      POCT Blood Glucose.: 110 mg/dL (22 Jan 2025 17:07)  POCT Blood Glucose.: 159 mg/dL (22 Jan 2025 11:59)  POCT Blood Glucose.: 86 mg/dL (22 Jan 2025 07:53)                              Chart, Consultant(s) Notes Reviewed:  [x ] YES  [ ] NO  Care Discussed with Consultants/Other Providers/ Housestaff [ x] YES  [ ] NO  Radiology, labs, old available records personally reviewed.        < from: TTE Echo Complete w/o Contrast w/ Doppler (01.21.25 @ 10:11) >   1. Normal global left ventricular systolic function with a biplane EF of   69%. Normal diastolic function. No regional wall motion abnormalities.   2. Normal right ventricular size and function.   3. Normal atria.   4. Thickening and calcification of the anterior and posterior mitral   valve leaflets.   5. Sclerotic aortic valve with decreased opening.   6. Adequate TR velocity was not obtained to accurately assess RVSP.   7. There is no evidence of pericardial effusion.                            IMAGING  MR Head No Cont (01.21.25 @ 17:20)  IMPRESSION:    1.  Acute white matter infarct left corona radiata    2.  Moderate microvascular ischemic changes.

## 2025-01-22 NOTE — CONSULT NOTE ADULT - REASON FOR ADMISSION
Recurrent witnessed falls, AMS

## 2025-01-22 NOTE — PROGRESS NOTE ADULT - SUBJECTIVE AND OBJECTIVE BOX
Neurology Stroke Progress Note    INTERVAL HPI/OVERNIGHT EVENTS:  75 y/o M with a PMHx of Parkinson's disease, HTN, hx ischemic stroke, DM II, dementia and chronic back pain, Hx of seizures on Keppra BIBA from [facility] for generalized weakness, confusion, decreased PO intake and multiple witnessed falls over the past few days. Admitted to medicine for AMS, Neurology was consulted for AMS despite lack of metabolic derangements. From information obtained from both patient and collateral, patient has had a recent decline in mental status including expressive aphasia, impaired short term memory and confusion for the past couple of months. Evaluated by general neurology on 1/21 and noted to have R facial droop, brisk R patellar reflex, positive R babinski reflex, a trembling in his lower left lip, and a left-handed resting tremor with a postural component. Apparently, R facial droop was first noted yesterday 1/20 by collateral/primary contact. CTH on 1/15 negative. MRI brain non con obtained overnight showing acute L corona radiata infarct. Neurovascular to follow today.       MEDICATIONS  (STANDING):  amLODIPine   Tablet 5 milliGRAM(s) Oral daily  aspirin  chewable 81 milliGRAM(s) Oral daily  atorvastatin 40 milliGRAM(s) Oral at bedtime  carbidopa/levodopa  25/100 2 Tablet(s) Oral three times a day  carbidopa/levodopa CR 50/200 1 Tablet(s) Oral <User Schedule>  carvedilol 3.125 milliGRAM(s) Oral every 12 hours  chlorhexidine 2% Cloths 1 Application(s) Topical <User Schedule>  dextrose 5%. 1000 milliLiter(s) (50 mL/Hr) IV Continuous <Continuous>  dextrose 5%. 1000 milliLiter(s) (100 mL/Hr) IV Continuous <Continuous>  dextrose 50% Injectable 25 Gram(s) IV Push once  dextrose 50% Injectable 12.5 Gram(s) IV Push once  dextrose 50% Injectable 25 Gram(s) IV Push once  enoxaparin Injectable 40 milliGRAM(s) SubCutaneous every 24 hours  entacapone 200 milliGRAM(s) Oral three times a day  gabapentin 100 milliGRAM(s) Oral at bedtime  glucagon  Injectable 1 milliGRAM(s) IntraMuscular once  insulin lispro (ADMELOG) corrective regimen sliding scale   SubCutaneous three times a day before meals  levETIRAcetam 500 milliGRAM(s) Oral two times a day  mirtazapine 7.5 milliGRAM(s) Oral daily  polyethylene glycol 3350 17 Gram(s) Oral daily  rivastigmine patch  9.5 mG/24 Hr(s) 1 Patch Transdermal every 24 hours  senna 2 Tablet(s) Oral at bedtime  traZODone 100 milliGRAM(s) Oral at bedtime    MEDICATIONS  (PRN):  acetaminophen     Tablet .. 650 milliGRAM(s) Oral every 6 hours PRN Temp greater or equal to 38C (100.4F), Mild Pain (1 - 3)  dextrose Oral Gel 15 Gram(s) Oral once PRN Blood Glucose LESS THAN 70 milliGRAM(s)/deciliter  melatonin 3 milliGRAM(s) Oral at bedtime PRN Insomnia      Allergies    sulfa drugs (Unknown)  cefaclor (Unknown)  penicillin (Unknown)    Intolerances        ROS: As per HPI, otherwise negative    Vital Signs Last 24 Hrs  T(C): 36.4 (22 Jan 2025 05:18), Max: 36.9 (21 Jan 2025 20:58)  T(F): 97.6 (22 Jan 2025 05:18), Max: 98.4 (21 Jan 2025 20:58)  HR: 57 (22 Jan 2025 05:18) (55 - 77)  BP: 146/89 (22 Jan 2025 05:18) (140/80 - 162/91)  BP(mean): 115 (22 Jan 2025 03:25) (115 - 115)  RR: 18 (22 Jan 2025 05:18) (18 - 18)  SpO2: 96% (22 Jan 2025 05:18) (96% - 98%)    Parameters below as of 22 Jan 2025 05:18  Patient On (Oxygen Delivery Method): room air        Physical exam:  General: awake and alert, sitting comfortably, no acute distress    Neurologic:  Mental status: awake, alert, oriented to name and place only. Expressive aphasia noted as pt unable to name.    Cranial nerves:   II: visual fields are full to confrontation. pupils equally round and reactive to light,   III, IV, VI: EOMI without nystagmus  V:  V1-V3 sensation intact,   VII: R facial droop   XII: tongue midline  Motor: Normal bulk and tone, strength 5/5 in b/l UE and LE,  strength 5/5. No pronator drift, LUE tremor noted.   Sensation: intact to light touch. No neglect.  Coordination: No dysmetria on finger-to-nose and heel-to-shin  Gait: narrow and steady        LABS:                RADIOLOGY & ADDITIONAL TESTS:  < from: MR Head No Cont (01.21.25 @ 17:20) >  IMPRESSION:    1.  Acute white matter infarct left corona radiata    2.  Moderate microvascular ischemic changes.    --- End of Report ---    < end of copied text >       Neurology Stroke Progress Note    INTERVAL HPI/OVERNIGHT EVENTS:  73 y/o M with a PMHx of Parkinson's disease, HTN, hx ischemic stroke, DM II, dementia and chronic back pain, Hx of seizures on Keppra BIBA from [facility] for generalized weakness, confusion, decreased PO intake and multiple witnessed falls over the past few days. Admitted to medicine for AMS, Neurology was consulted for AMS despite lack of metabolic derangements. From information obtained from both patient and collateral, patient has had a recent decline in mental status including expressive aphasia, impaired short term memory and confusion for the past couple of months. Evaluated by general neurology on 1/21 and noted to have R facial droop, brisk R patellar reflex, positive R babinski reflex, a trembling in his lower left lip, and a left-handed resting tremor with a postural component. Apparently, R facial droop was first noted yesterday 1/20 by collateral/primary contact. CTH on 1/15 negative. MRI brain non con obtained overnight showing acute L corona radiata infarct. Neurovascular to follow today.       MEDICATIONS  (STANDING):  amLODIPine   Tablet 5 milliGRAM(s) Oral daily  aspirin  chewable 81 milliGRAM(s) Oral daily  atorvastatin 40 milliGRAM(s) Oral at bedtime  carbidopa/levodopa  25/100 2 Tablet(s) Oral three times a day  carbidopa/levodopa CR 50/200 1 Tablet(s) Oral <User Schedule>  carvedilol 3.125 milliGRAM(s) Oral every 12 hours  chlorhexidine 2% Cloths 1 Application(s) Topical <User Schedule>  dextrose 5%. 1000 milliLiter(s) (50 mL/Hr) IV Continuous <Continuous>  dextrose 5%. 1000 milliLiter(s) (100 mL/Hr) IV Continuous <Continuous>  dextrose 50% Injectable 25 Gram(s) IV Push once  dextrose 50% Injectable 12.5 Gram(s) IV Push once  dextrose 50% Injectable 25 Gram(s) IV Push once  enoxaparin Injectable 40 milliGRAM(s) SubCutaneous every 24 hours  entacapone 200 milliGRAM(s) Oral three times a day  gabapentin 100 milliGRAM(s) Oral at bedtime  glucagon  Injectable 1 milliGRAM(s) IntraMuscular once  insulin lispro (ADMELOG) corrective regimen sliding scale   SubCutaneous three times a day before meals  levETIRAcetam 500 milliGRAM(s) Oral two times a day  mirtazapine 7.5 milliGRAM(s) Oral daily  polyethylene glycol 3350 17 Gram(s) Oral daily  rivastigmine patch  9.5 mG/24 Hr(s) 1 Patch Transdermal every 24 hours  senna 2 Tablet(s) Oral at bedtime  traZODone 100 milliGRAM(s) Oral at bedtime    MEDICATIONS  (PRN):  acetaminophen     Tablet .. 650 milliGRAM(s) Oral every 6 hours PRN Temp greater or equal to 38C (100.4F), Mild Pain (1 - 3)  dextrose Oral Gel 15 Gram(s) Oral once PRN Blood Glucose LESS THAN 70 milliGRAM(s)/deciliter  melatonin 3 milliGRAM(s) Oral at bedtime PRN Insomnia      Allergies    sulfa drugs (Unknown)  cefaclor (Unknown)  penicillin (Unknown)    Intolerances        ROS: As per HPI, otherwise negative    Vital Signs Last 24 Hrs  T(C): 36.4 (22 Jan 2025 05:18), Max: 36.9 (21 Jan 2025 20:58)  T(F): 97.6 (22 Jan 2025 05:18), Max: 98.4 (21 Jan 2025 20:58)  HR: 57 (22 Jan 2025 05:18) (55 - 77)  BP: 146/89 (22 Jan 2025 05:18) (140/80 - 162/91)  BP(mean): 115 (22 Jan 2025 03:25) (115 - 115)  RR: 18 (22 Jan 2025 05:18) (18 - 18)  SpO2: 96% (22 Jan 2025 05:18) (96% - 98%)    Parameters below as of 22 Jan 2025 05:18  Patient On (Oxygen Delivery Method): room air        Physical exam:  General: awake and alert, sitting comfortably, no acute distress    Neurologic:  Mental status: awake, alert, oriented to name and place only. Hypophonic. Mild dysarthria. Expressive aphasia noted as pt unable to name.   Cranial nerves:   II: visual fields are full to confrontation. pupils equally round and reactive to light,   III, IV, VI: EOMI without nystagmus  V:  V1-V3 sensation intact,   VII: R lower facial droop   XII: L tongue deviation   Motor: Normal bulk and tone, strength 5/5 in b/l UE and LE,  strength 5/5. No pronator drift, LUE tremor noted.   Sensation: intact to light touch. No neglect.  Coordination: No dysmetria on finger-to-nose and heel-to-shin    NIHSS 2     LABS:      RADIOLOGY & ADDITIONAL TESTS:  < from: MR Head No Cont (01.21.25 @ 17:20) >  IMPRESSION:    1.  Acute white matter infarct left corona radiata    2.  Moderate microvascular ischemic changes.    --- End of Report ---    < end of copied text >       Neurology Stroke Progress Note    INTERVAL HPI/OVERNIGHT EVENTS:  75 y/o M with a PMHx of Parkinson's disease, HTN, hx ischemic stroke, DM II, dementia and chronic back pain, Hx of seizures on Keppra BIBA from [facility] for generalized weakness, confusion, decreased PO intake and multiple witnessed falls over the past few days. Admitted to medicine for AMS, Neurology was consulted for AMS despite lack of metabolic derangements. From information obtained from both patient and collateral, patient has had a recent decline in mental status including expressive aphasia, impaired short term memory and confusion for the past couple of months. Evaluated by general neurology on 1/21 and noted to have R facial droop, brisk R patellar reflex, positive R babinski reflex, a trembling in his lower left lip, and a left-handed resting tremor with a postural component. Apparently, R facial droop was first noted yesterday 1/20 by collateral/primary contact. CTH on 1/15 negative. MRI brain non con obtained overnight showing acute L corona radiata infarct. Neurovascular to follow today.       MEDICATIONS  (STANDING):  amLODIPine   Tablet 5 milliGRAM(s) Oral daily  aspirin  chewable 81 milliGRAM(s) Oral daily  atorvastatin 40 milliGRAM(s) Oral at bedtime  carbidopa/levodopa  25/100 2 Tablet(s) Oral three times a day  carbidopa/levodopa CR 50/200 1 Tablet(s) Oral <User Schedule>  carvedilol 3.125 milliGRAM(s) Oral every 12 hours  chlorhexidine 2% Cloths 1 Application(s) Topical <User Schedule>  dextrose 5%. 1000 milliLiter(s) (50 mL/Hr) IV Continuous <Continuous>  dextrose 5%. 1000 milliLiter(s) (100 mL/Hr) IV Continuous <Continuous>  dextrose 50% Injectable 25 Gram(s) IV Push once  dextrose 50% Injectable 12.5 Gram(s) IV Push once  dextrose 50% Injectable 25 Gram(s) IV Push once  enoxaparin Injectable 40 milliGRAM(s) SubCutaneous every 24 hours  entacapone 200 milliGRAM(s) Oral three times a day  gabapentin 100 milliGRAM(s) Oral at bedtime  glucagon  Injectable 1 milliGRAM(s) IntraMuscular once  insulin lispro (ADMELOG) corrective regimen sliding scale   SubCutaneous three times a day before meals  levETIRAcetam 500 milliGRAM(s) Oral two times a day  mirtazapine 7.5 milliGRAM(s) Oral daily  polyethylene glycol 3350 17 Gram(s) Oral daily  rivastigmine patch  9.5 mG/24 Hr(s) 1 Patch Transdermal every 24 hours  senna 2 Tablet(s) Oral at bedtime  traZODone 100 milliGRAM(s) Oral at bedtime    MEDICATIONS  (PRN):  acetaminophen     Tablet .. 650 milliGRAM(s) Oral every 6 hours PRN Temp greater or equal to 38C (100.4F), Mild Pain (1 - 3)  dextrose Oral Gel 15 Gram(s) Oral once PRN Blood Glucose LESS THAN 70 milliGRAM(s)/deciliter  melatonin 3 milliGRAM(s) Oral at bedtime PRN Insomnia      Allergies    sulfa drugs (Unknown)  cefaclor (Unknown)  penicillin (Unknown)    Intolerances        ROS: As per HPI, otherwise negative    Vital Signs Last 24 Hrs  T(C): 36.4 (22 Jan 2025 05:18), Max: 36.9 (21 Jan 2025 20:58)  T(F): 97.6 (22 Jan 2025 05:18), Max: 98.4 (21 Jan 2025 20:58)  HR: 57 (22 Jan 2025 05:18) (55 - 77)  BP: 146/89 (22 Jan 2025 05:18) (140/80 - 162/91)  BP(mean): 115 (22 Jan 2025 03:25) (115 - 115)  RR: 18 (22 Jan 2025 05:18) (18 - 18)  SpO2: 96% (22 Jan 2025 05:18) (96% - 98%)    Parameters below as of 22 Jan 2025 05:18  Patient On (Oxygen Delivery Method): room air        Physical exam:  General: awake and alert, sitting comfortably, no acute distress    Neurologic:  Mental status: awake, alert, oriented to name and place only. Hypophonic. Mild dysarthria. Expressive aphasia noted as pt unable to name.   Cranial nerves:   II: visual fields are full to confrontation. pupils equally round and reactive to light,   III, IV, VI: EOMI without nystagmus  V:  V1-V3 sensation intact,   VII: R lower facial droop   XII: L tongue deviation   Motor: Normal bulk and tone, strength 5/5 in b/l UE and LE,  strength 5/5. No pronator drift, LUE tremor noted.   Sensation: intact to light touch. No neglect.  Coordination: No dysmetria on finger-to-nose and heel-to-shin    NIHSS 6 for mild aphasia and dysarthria, LOC questions and R facial (2)     LABS:      RADIOLOGY & ADDITIONAL TESTS:  < from: MR Head No Cont (01.21.25 @ 17:20) >  IMPRESSION:    1.  Acute white matter infarct left corona radiata    2.  Moderate microvascular ischemic changes.    --- End of Report ---    < end of copied text >

## 2025-01-22 NOTE — EEG REPORT - NS EEG TEXT BOX
[facility] Department of Neurology  Inpatient Routine-EEG Report      Patient Name:	ZAKIA MILLS    :	1950  MRN:	-  Study Date/Time:	2025, 1:15:00 PM  Referred by:	-    Brief Clinical History:  ZAKIA MILLS is a 74 year old Male; study performed to investigate for seizures or markers of epilepsy.   Diagnosis Code: R40.4 Transient alteration of awareness    Patient Medication:  DESYREL    REMERON    LOVENOX    TYLENOL    SINEMET    NEURONTIN    COMTAN    KEPPRA      Acquisition Details:  Electroencephalography was acquired using a minimum of 21 channels on an [employer] system v 9.3.1 with electrode placement according to the standard International 10-20 system following ACNS (American Clinical Neurophysiology Society) guidelines.  Anterior temporal T1 and T2 electrodes were utilized whenever possible.   The [employer] automated spike & seizure detections were all reviewed in detail, in addition to the entire raw EEG.    Findings:  Background:  continuous.   Voltage:  Normal (20uV)  Organization:  Appropriate anterior-posterior gradient  Posterior Dominant Rhythm:  9 Hz symmetric, well-organized, and well-modulated  Variability:  Yes	Reactivity:  Yes  Sleep:  Absent.  Focal abnormalities:  No persistent asymmetries of voltage or frequency.  Interictal Activity:  None  Focal Slowing:  None  Generalized Slowing:  Mild  Events:  1)	No electrographic seizures or significant clinical events.  Provocations:  1)	Hyperventilation: was not performed.  2)	Photic stimulation: was not performed.  Impression:  Abnormal due to generalized slowing as above    Clinical Correlation:  Findings consistent with mild diffuse electrocerebral dysfunction secondary to nonspecific etiology    Dany Gorman MD  Attending Neurologist, Division of Epilepsy

## 2025-01-22 NOTE — CHART NOTE - NSCHARTNOTEFT_GEN_A_CORE
HPI    73 y/o M with a PMHx of Parkinson's disease, HTN, hx ischemic stroke, DM II, dementia and chronic back pain, Hx of seizures on Keppra BIBA from [facility] for generalized weakness, confusion, decreased PO intake and multiple witnessed falls over the past few days. Admitted to medicine for AMS, Neurology was consulted for AMS despite lack of metabolic derangements. From information obtained from both patient and collateral, patient has had a recent decline in mental status including expressive aphasia, impaired short term memory and confusion for the past couple of months. Evaluated by general neurology on 1/21 and noted to have R facial droop, brisk R patellar reflex, positive R babinski reflex, a trembling in his lower left lip, and a left-handed resting tremor with a postural component. Apparently, R facial droop was first noted 1/20 by collateral/primary contact. CTH on 1/15 negative. MRI brain non con obtained overnight showing acute L corona radiata infarct. Patient showing new significant R tongue deviation this AM. Transferred to stroke team  for further workup and management.       Neurologic:  Mental status: awake, alert, oriented to name and place only. Hypophonic. Mild dysarthria. Expressive aphasia noted as pt unable to name most objects in the NIHSS scale    Cranial nerves:   II: visual fields are full to confrontation. pupils equally round and reactive to light,   III, IV, VI: EOMI without nystagmus  V:  V1-V3 sensation intact,   VII: R lower facial droop   XII: L tongue deviation   Motor: Normal bulk and tone, strength 5/5 in b/l UE and LE,  strength 5/5. No pronator drift, LUE tremor noted.   Sensation: intact to light touch, temperature and vibration. No neglect.  Coordination: No dysmetria on finger-to-nose and heel-to-shin    NIHSS 6 for mild aphasia and dysarthria, LOC questions and R facial (2)         Plan:    Neuro  #Acute ischemic stroke.   - f/u CTA head and neck   - Continue aspirin 81mg daily for now, started on Plavix 75mg once daily   - continue atorvastatin 80mg daily  - q8hr stroke neuro checks and vitals  - obtain MRI Brain short stroke protocol in light of new deficits  - Stroke education    Cards  #HTN  -  Goal -160  - TTE EF 69%, normal atrial size.    #HLD  - high dose statin as above in CVA  - LDL results: 101    Pulm  - Call provider if SPO2 < 94%    GI  #Nutrition/Fluids/Electrolytes   - replete K<4 and Mg <2  - Diet: DASH  - IVF:     Endocrine  #DM  - A1C results: 4.9  - ISS  - TSH results:  2.8    DVT Prophylaxis  - lovenox sq for DVT prophylaxis   - SCDs for DVT prophylaxis

## 2025-01-23 LAB
ALBUMIN SERPL ELPH-MCNC: 3.8 G/DL — SIGNIFICANT CHANGE UP (ref 3.5–5.2)
ALP SERPL-CCNC: 79 U/L — SIGNIFICANT CHANGE UP (ref 30–115)
ALT FLD-CCNC: <5 U/L — SIGNIFICANT CHANGE UP (ref 0–41)
ANION GAP SERPL CALC-SCNC: 10 MMOL/L — SIGNIFICANT CHANGE UP (ref 7–14)
AST SERPL-CCNC: 12 U/L — SIGNIFICANT CHANGE UP (ref 0–41)
BASOPHILS # BLD AUTO: 0.03 K/UL — SIGNIFICANT CHANGE UP (ref 0–0.2)
BASOPHILS NFR BLD AUTO: 0.4 % — SIGNIFICANT CHANGE UP (ref 0–1)
BILIRUB SERPL-MCNC: 0.9 MG/DL — SIGNIFICANT CHANGE UP (ref 0.2–1.2)
BUN SERPL-MCNC: 10 MG/DL — SIGNIFICANT CHANGE UP (ref 10–20)
CALCIUM SERPL-MCNC: 8.5 MG/DL — SIGNIFICANT CHANGE UP (ref 8.4–10.5)
CHLORIDE SERPL-SCNC: 106 MMOL/L — SIGNIFICANT CHANGE UP (ref 98–110)
CO2 SERPL-SCNC: 29 MMOL/L — SIGNIFICANT CHANGE UP (ref 17–32)
CREAT SERPL-MCNC: 1 MG/DL — SIGNIFICANT CHANGE UP (ref 0.7–1.5)
EGFR: 80 ML/MIN/1.73M2 — SIGNIFICANT CHANGE UP
EOSINOPHIL # BLD AUTO: 0.11 K/UL — SIGNIFICANT CHANGE UP (ref 0–0.7)
EOSINOPHIL NFR BLD AUTO: 1.5 % — SIGNIFICANT CHANGE UP (ref 0–8)
GLUCOSE BLDC GLUCOMTR-MCNC: 107 MG/DL — HIGH (ref 70–99)
GLUCOSE BLDC GLUCOMTR-MCNC: 176 MG/DL — HIGH (ref 70–99)
GLUCOSE BLDC GLUCOMTR-MCNC: 91 MG/DL — SIGNIFICANT CHANGE UP (ref 70–99)
GLUCOSE BLDC GLUCOMTR-MCNC: 92 MG/DL — SIGNIFICANT CHANGE UP (ref 70–99)
GLUCOSE BLDC GLUCOMTR-MCNC: 99 MG/DL — SIGNIFICANT CHANGE UP (ref 70–99)
GLUCOSE SERPL-MCNC: 66 MG/DL — LOW (ref 70–99)
HCT VFR BLD CALC: 45.6 % — SIGNIFICANT CHANGE UP (ref 42–52)
HGB BLD-MCNC: 15.7 G/DL — SIGNIFICANT CHANGE UP (ref 14–18)
IMM GRANULOCYTES NFR BLD AUTO: 0.4 % — HIGH (ref 0.1–0.3)
LYMPHOCYTES # BLD AUTO: 2.18 K/UL — SIGNIFICANT CHANGE UP (ref 1.2–3.4)
LYMPHOCYTES # BLD AUTO: 30 % — SIGNIFICANT CHANGE UP (ref 20.5–51.1)
MAGNESIUM SERPL-MCNC: 1.8 MG/DL — SIGNIFICANT CHANGE UP (ref 1.8–2.4)
MCHC RBC-ENTMCNC: 34.1 PG — HIGH (ref 27–31)
MCHC RBC-ENTMCNC: 34.4 G/DL — SIGNIFICANT CHANGE UP (ref 32–37)
MCV RBC AUTO: 99.1 FL — HIGH (ref 80–94)
MONOCYTES # BLD AUTO: 0.64 K/UL — HIGH (ref 0.1–0.6)
MONOCYTES NFR BLD AUTO: 8.8 % — SIGNIFICANT CHANGE UP (ref 1.7–9.3)
NEUTROPHILS # BLD AUTO: 4.27 K/UL — SIGNIFICANT CHANGE UP (ref 1.4–6.5)
NEUTROPHILS NFR BLD AUTO: 58.9 % — SIGNIFICANT CHANGE UP (ref 42.2–75.2)
NRBC # BLD: 0 /100 WBCS — SIGNIFICANT CHANGE UP (ref 0–0)
NRBC BLD-RTO: 0 /100 WBCS — SIGNIFICANT CHANGE UP (ref 0–0)
PHOSPHATE SERPL-MCNC: 3.4 MG/DL — SIGNIFICANT CHANGE UP (ref 2.1–4.9)
PLATELET # BLD AUTO: 133 K/UL — SIGNIFICANT CHANGE UP (ref 130–400)
PMV BLD: 9.7 FL — SIGNIFICANT CHANGE UP (ref 7.4–10.4)
POTASSIUM SERPL-MCNC: 4.1 MMOL/L — SIGNIFICANT CHANGE UP (ref 3.5–5)
POTASSIUM SERPL-SCNC: 4.1 MMOL/L — SIGNIFICANT CHANGE UP (ref 3.5–5)
PROT SERPL-MCNC: 5.7 G/DL — LOW (ref 6–8)
RBC # BLD: 4.6 M/UL — LOW (ref 4.7–6.1)
RBC # FLD: 11.7 % — SIGNIFICANT CHANGE UP (ref 11.5–14.5)
SODIUM SERPL-SCNC: 145 MMOL/L — SIGNIFICANT CHANGE UP (ref 135–146)
WBC # BLD: 7.26 K/UL — SIGNIFICANT CHANGE UP (ref 4.8–10.8)
WBC # FLD AUTO: 7.26 K/UL — SIGNIFICANT CHANGE UP (ref 4.8–10.8)

## 2025-01-23 PROCEDURE — 99232 SBSQ HOSP IP/OBS MODERATE 35: CPT

## 2025-01-23 PROCEDURE — 70551 MRI BRAIN STEM W/O DYE: CPT | Mod: 26

## 2025-01-23 RX ORDER — ATORVASTATIN CALCIUM 80 MG/1
80 TABLET, FILM COATED ORAL AT BEDTIME
Refills: 0 | Status: DISCONTINUED | OUTPATIENT
Start: 2025-01-23 | End: 2025-01-24

## 2025-01-23 RX ORDER — CARVEDILOL 6.25 MG
1 TABLET ORAL
Qty: 60 | Refills: 0
Start: 2025-01-23 | End: 2025-02-21

## 2025-01-23 RX ORDER — ATORVASTATIN CALCIUM 80 MG/1
1 TABLET, FILM COATED ORAL
Qty: 0 | Refills: 0 | DISCHARGE
Start: 2025-01-23

## 2025-01-23 RX ORDER — CARVEDILOL 6.25 MG
1 TABLET ORAL
Refills: 0 | DISCHARGE

## 2025-01-23 RX ADMIN — ENTACAPONE 200 MILLIGRAM(S): 200 TABLET, FILM COATED ORAL at 21:42

## 2025-01-23 RX ADMIN — Medication 5 MILLIGRAM(S): at 05:21

## 2025-01-23 RX ADMIN — ASPIRIN 81 MILLIGRAM(S): 81 TABLET, COATED ORAL at 12:51

## 2025-01-23 RX ADMIN — ENOXAPARIN SODIUM 40 MILLIGRAM(S): 100 INJECTION SUBCUTANEOUS at 05:21

## 2025-01-23 RX ADMIN — ATORVASTATIN CALCIUM 80 MILLIGRAM(S): 80 TABLET, FILM COATED ORAL at 21:42

## 2025-01-23 RX ADMIN — Medication 3.12 MILLIGRAM(S): at 17:16

## 2025-01-23 RX ADMIN — Medication 1 TABLET(S): at 17:23

## 2025-01-23 RX ADMIN — POLYETHYLENE GLYCOL 3350 17 GRAM(S): 17 POWDER, FOR SOLUTION ORAL at 12:51

## 2025-01-23 RX ADMIN — Medication 100 MILLIGRAM(S): at 21:42

## 2025-01-23 RX ADMIN — LEVETIRACETAM 500 MILLIGRAM(S): 750 TABLET, FILM COATED ORAL at 05:21

## 2025-01-23 RX ADMIN — MIRTAZAPINE 7.5 MILLIGRAM(S): 30 TABLET, FILM COATED ORAL at 12:51

## 2025-01-23 RX ADMIN — LEVETIRACETAM 500 MILLIGRAM(S): 750 TABLET, FILM COATED ORAL at 17:16

## 2025-01-23 RX ADMIN — ENTACAPONE 200 MILLIGRAM(S): 200 TABLET, FILM COATED ORAL at 13:51

## 2025-01-23 RX ADMIN — RIVASTIGMINE 1 PATCH: 4.6 PATCH, EXTENDED RELEASE TRANSDERMAL at 05:23

## 2025-01-23 RX ADMIN — ENTACAPONE 200 MILLIGRAM(S): 200 TABLET, FILM COATED ORAL at 05:22

## 2025-01-23 RX ADMIN — Medication 2 TABLET(S): at 13:51

## 2025-01-23 RX ADMIN — Medication 2 TABLET(S): at 21:42

## 2025-01-23 RX ADMIN — Medication 2 TABLET(S): at 05:22

## 2025-01-23 RX ADMIN — Medication 3.12 MILLIGRAM(S): at 05:21

## 2025-01-23 RX ADMIN — GABAPENTIN 100 MILLIGRAM(S): 800 TABLET ORAL at 21:42

## 2025-01-23 RX ADMIN — ANTISEPTIC SURGICAL SCRUB 1 APPLICATION(S): 0.04 SOLUTION TOPICAL at 05:23

## 2025-01-23 RX ADMIN — Medication 75 MILLIGRAM(S): at 12:51

## 2025-01-23 NOTE — PROGRESS NOTE ADULT - SUBJECTIVE AND OBJECTIVE BOX
Patient is a 74y old  Male who presents with a chief complaint of Recurrent witnessed falls, AMS       HPI:  74M PMH of Parkinson's, HTN, hx ischemic CVA x2, type 2 DM, dementia and chronic back pain, Hx of seizures on keppra BIBA from [facility]  or generalized weakness, confusion, decreased PO intake and multiple witnessed falls over the past few days. He had two witnessed falls the day prior to admission where he was weak and slumped to the ground. No LOC, not on AC. Per staff, he is normally alert and oriented x 3 and able to perform ADLs, but for the past few days has been increasingly confused. He was evaluated in the ED the day prior for similar complaints, had a negative work up and was discharged.  The Patient is A+Ox2-3 on interview, poor historian, cannot recollect the events that occurred, does endorse pain around the left eye.   Denies HA, f/c, CP, SOB, abdominal pain, dysuria, n/v/diarrhea, blood in stool or urine  Tried reaching [facility] for further information with no answer.       · BP Systolic 161 mm Hg  · BP Diastolic 96 mm Hg  · Heart Rate 78 /min  · Respiration Rate (breaths/min) 18 /min  · Temp (F) 98.2 Degrees F  · SpO2 (%) 98 % on RA     Labs: CBC, CMP wnl, trop 11 --> 9  UA negative  CTH (1/15): No acute intracranial pathology. No evidence of midline shift, mass effect or intracranial hemorrhage. Moderate chronic microvascular type changes as well as multiple chronic lacunar infarcts, detailed above.    CXR: No radiographic evidence of acute cardiopulmonary disease.  xray pelvis: Degenerative changes of the lower lumbar spine. Proliferative changes of the pelvis. No acute displaced fracture.      Evaluated by general neurology on 1/21 and noted to have R facial droop, brisk R patellar reflex, positive R babinski reflex, a trembling in his lower left lip, and a left-handed resting tremor with a postural component. CTH on 1/15 negative. MRI brain non con obtained overnight showing acute L corona radiata infarct.     < from: MR Head No Cont (01.21.25 @ 17:20) >  ACC: [id number] EXAM:  MR BRAIN   ORDERED BY: BETTY PAUL     *** ADDENDUM # 1 ***    Multiple foci of susceptibility artifact demonstrated predominately   within the basal ganglia and cerebellum noted. This can correlate with   hypertension.    --- End of Report ---    < end of copied text >            PHYSICAL EXAM    Vital Signs Last 24 Hrs  T(C): 36.6 (23 Jan 2025 04:39), Max: 36.8 (22 Jan 2025 17:40)  T(F): 97.9 (23 Jan 2025 04:39), Max: 98.2 (22 Jan 2025 17:40)  HR: 70 (23 Jan 2025 04:39) (65 - 86)  BP: 142/78 (23 Jan 2025 04:39) (142/78 - 163/101)  BP(mean): 109 (23 Jan 2025 04:39) (104 - 118)  RR: 18 (23 Jan 2025 04:39) (17 - 18)  SpO2: 97% (23 Jan 2025 04:39) (96% - 99%)    Parameters below as of 23 Jan 2025 04:39  Patient On (Oxygen Delivery Method): room air        Constitutional - NAD  Chest - CTA  Cardiovascular - S1S2+  Abdomen -  Soft  Extremities -  No calf tenderness   Neuro: right facial droop, + dysarthria, 3-4/5 R side motor strength   Function : bed mobility and transfer min A                 ambulate 40'Rw min A     acetaminophen     Tablet .. 650 milliGRAM(s) Oral every 6 hours PRN  amLODIPine   Tablet 5 milliGRAM(s) Oral daily  aspirin  chewable 81 milliGRAM(s) Oral daily  atorvastatin 40 milliGRAM(s) Oral at bedtime  carbidopa/levodopa  25/100 2 Tablet(s) Oral three times a day  carbidopa/levodopa CR 50/200 1 Tablet(s) Oral <User Schedule>  carvedilol 3.125 milliGRAM(s) Oral every 12 hours  chlorhexidine 2% Cloths 1 Application(s) Topical <User Schedule>  clopidogrel Tablet 75 milliGRAM(s) Oral daily  dextrose 5%. 1000 milliLiter(s) IV Continuous <Continuous>  dextrose 5%. 1000 milliLiter(s) IV Continuous <Continuous>  dextrose 50% Injectable 25 Gram(s) IV Push once  dextrose 50% Injectable 12.5 Gram(s) IV Push once  dextrose 50% Injectable 25 Gram(s) IV Push once  dextrose Oral Gel 15 Gram(s) Oral once PRN  enoxaparin Injectable 40 milliGRAM(s) SubCutaneous every 24 hours  entacapone 200 milliGRAM(s) Oral three times a day  gabapentin 100 milliGRAM(s) Oral at bedtime  glucagon  Injectable 1 milliGRAM(s) IntraMuscular once  insulin lispro (ADMELOG) corrective regimen sliding scale   SubCutaneous three times a day before meals  levETIRAcetam 500 milliGRAM(s) Oral two times a day  melatonin 3 milliGRAM(s) Oral at bedtime PRN  mirtazapine 7.5 milliGRAM(s) Oral daily  polyethylene glycol 3350 17 Gram(s) Oral daily  rivastigmine patch  9.5 mG/24 Hr(s) 1 Patch Transdermal every 24 hours  senna 2 Tablet(s) Oral at bedtime  traZODone 100 milliGRAM(s) Oral at bedtime      RECENT LABS/IMAGING                        15.7   7.26  )-----------( 133      ( 23 Jan 2025 05:56 )             45.6     01-23    145  |  106  |  10  ----------------------------<  66[L]  4.1   |  29  |  1.0    Ca    8.5      23 Jan 2025 05:56  Phos  3.4     01-23  Mg     1.8     01-23    TPro  5.7[L]  /  Alb  3.8  /  TBili  0.9  /  DBili  x   /  AST  12  /  ALT  <5  /  AlkPhos  79  01-23      Urinalysis Basic - ( 23 Jan 2025 05:56 )    Color: x / Appearance: x / SG: x / pH: x  Gluc: 66 mg/dL / Ketone: x  / Bili: x / Urobili: x   Blood: x / Protein: x / Nitrite: x   Leuk Esterase: x / RBC: x / WBC x   Sq Epi: x / Non Sq Epi: x / Bacteria: x

## 2025-01-23 NOTE — PROGRESS NOTE ADULT - SUBJECTIVE AND OBJECTIVE BOX
GENE ZAKIA  74y, Male  Allergy: sulfa drugs (Unknown)  cefaclor (Unknown)  penicillin (Unknown)    Hospital Day: 7d    Patient seen and examined earlier today. Feeling well , no complaints. Endorsed waking up a bit confused, thought he was in [place], but since then it has resolved and he knows where he is now.     PMH/PSH:  PAST MEDICAL & SURGICAL HISTORY:  Parkinson disease      CVA (cerebral vascular accident)      HTN (hypertension)      Diabetes mellitus      Gout      History of appendectomy          LAST 24-Hr EVENTS:    VITALS:  T(F): 97.9 (01-23-25 @ 04:39), Max: 98.2 (01-22-25 @ 17:40)  HR: 70 (01-23-25 @ 04:39)  BP: 142/78 (01-23-25 @ 04:39) (142/78 - 163/101)  RR: 18 (01-23-25 @ 04:39)  SpO2: 97% (01-23-25 @ 04:39)        TESTS & MEASUREMENTS:  Weight (Kg): 69.3 (01-22-25 @ 03:25)                            15.7   7.26  )-----------( 133      ( 23 Jan 2025 05:56 )             45.6       01-23    145  |  106  |  10  ----------------------------<  66[L]  4.1   |  29  |  1.0    Ca    8.5      23 Jan 2025 05:56  Phos  3.4     01-23  Mg     1.8     01-23    TPro  5.7[L]  /  Alb  3.8  /  TBili  0.9  /  DBili  x   /  AST  12  /  ALT  <5  /  AlkPhos  79  01-23    LIVER FUNCTIONS - ( 23 Jan 2025 05:56 )  Alb: 3.8 g/dL / Pro: 5.7 g/dL / ALK PHOS: 79 U/L / ALT: <5 U/L / AST: 12 U/L / GGT: x                 Urinalysis with Rflx Culture (collected 01-16-25 @ 15:23)      Urinalysis Basic - ( 23 Jan 2025 05:56 )    Color: x / Appearance: x / SG: x / pH: x  Gluc: 66 mg/dL / Ketone: x  / Bili: x / Urobili: x   Blood: x / Protein: x / Nitrite: x   Leuk Esterase: x / RBC: x / WBC x   Sq Epi: x / Non Sq Epi: x / Bacteria: x                  RADIOLOGY, ECG, & ADDITIONAL TESTS:      RECENT DIAGNOSTIC ORDERS:  Phosphorus: AM Sched. Collection: 24-Jan-2025 04:30 (01-23-25 @ 12:08)  Magnesium: AM Sched. Collection: 24-Jan-2025 04:30 (01-23-25 @ 12:08)  Complete Blood Count: AM Sched. Collection: 24-Jan-2025 04:30 (01-23-25 @ 12:08)  Basic Metabolic Panel: AM Sched. Collection: 24-Jan-2025 04:30 (01-23-25 @ 12:08)  Diet, Soft and Bite Sized:   Consistent Carbohydrate Evening Snack (CSTCHOSN)  DASH/TLC Sodium & Cholesterol Restricted (DASH) (01-23-25 @ 12:06)      MEDICATIONS:  MEDICATIONS  (STANDING):  amLODIPine   Tablet 5 milliGRAM(s) Oral daily  aspirin  chewable 81 milliGRAM(s) Oral daily  atorvastatin 80 milliGRAM(s) Oral at bedtime  carbidopa/levodopa  25/100 2 Tablet(s) Oral three times a day  carbidopa/levodopa CR 50/200 1 Tablet(s) Oral <User Schedule>  carvedilol 3.125 milliGRAM(s) Oral every 12 hours  chlorhexidine 2% Cloths 1 Application(s) Topical <User Schedule>  clopidogrel Tablet 75 milliGRAM(s) Oral daily  dextrose 5%. 1000 milliLiter(s) (100 mL/Hr) IV Continuous <Continuous>  dextrose 5%. 1000 milliLiter(s) (50 mL/Hr) IV Continuous <Continuous>  dextrose 50% Injectable 25 Gram(s) IV Push once  dextrose 50% Injectable 12.5 Gram(s) IV Push once  dextrose 50% Injectable 25 Gram(s) IV Push once  enoxaparin Injectable 40 milliGRAM(s) SubCutaneous every 24 hours  entacapone 200 milliGRAM(s) Oral three times a day  gabapentin 100 milliGRAM(s) Oral at bedtime  glucagon  Injectable 1 milliGRAM(s) IntraMuscular once  insulin lispro (ADMELOG) corrective regimen sliding scale   SubCutaneous three times a day before meals  levETIRAcetam 500 milliGRAM(s) Oral two times a day  mirtazapine 7.5 milliGRAM(s) Oral daily  polyethylene glycol 3350 17 Gram(s) Oral daily  rivastigmine patch  9.5 mG/24 Hr(s) 1 Patch Transdermal every 24 hours  senna 2 Tablet(s) Oral at bedtime  traZODone 100 milliGRAM(s) Oral at bedtime    MEDICATIONS  (PRN):  acetaminophen     Tablet .. 650 milliGRAM(s) Oral every 6 hours PRN Temp greater or equal to 38C (100.4F), Mild Pain (1 - 3)  dextrose Oral Gel 15 Gram(s) Oral once PRN Blood Glucose LESS THAN 70 milliGRAM(s)/deciliter  melatonin 3 milliGRAM(s) Oral at bedtime PRN Insomnia      HOME MEDICATIONS:  acetaminophen 325 mg oral tablet (01-17)  amLODIPine 5 mg oral tablet (01-23)  atorvastatin 80 mg oral tablet (01-23)  carbidopa-levodopa 50 mg-200 mg oral tablet, extended release (09-24)  clopidogrel 75 mg oral tablet (01-23)  Melatonin 3 mg oral tablet (09-24)  mirtazapine 7.5 mg oral tablet (01-17)  polyethylene glycol 3350 oral powder for reconstitution (09-24)  traZODone 100 mg oral tablet (09-24)      PHYSICAL EXAM:  GENERAL: A&O x3,  in NAD  HNENT: EOMI, PERRLA    NECK: No LAD/swelling  CHEST/LUNG:  CTAB no wheezes/rales/ronchi  HEART: RRR, No murmurs  ABDOMEN: Soft, NT, ND,  Bowel sounds present  EXTREMITIES:  Warm well perfused, no edema

## 2025-01-23 NOTE — PROGRESS NOTE ADULT - SUBJECTIVE AND OBJECTIVE BOX
Neurology Progress Note    Interval History:     Medications:  acetaminophen     Tablet .. 650 milliGRAM(s) Oral every 6 hours PRN  amLODIPine   Tablet 5 milliGRAM(s) Oral daily  aspirin  chewable 81 milliGRAM(s) Oral daily  atorvastatin 40 milliGRAM(s) Oral at bedtime  carbidopa/levodopa  25/100 2 Tablet(s) Oral three times a day  carbidopa/levodopa CR 50/200 1 Tablet(s) Oral <User Schedule>  carvedilol 3.125 milliGRAM(s) Oral every 12 hours  chlorhexidine 2% Cloths 1 Application(s) Topical <User Schedule>  clopidogrel Tablet 75 milliGRAM(s) Oral daily  dextrose 5%. 1000 milliLiter(s) IV Continuous <Continuous>  dextrose 5%. 1000 milliLiter(s) IV Continuous <Continuous>  dextrose 50% Injectable 25 Gram(s) IV Push once  dextrose 50% Injectable 12.5 Gram(s) IV Push once  dextrose 50% Injectable 25 Gram(s) IV Push once  dextrose Oral Gel 15 Gram(s) Oral once PRN  enoxaparin Injectable 40 milliGRAM(s) SubCutaneous every 24 hours  entacapone 200 milliGRAM(s) Oral three times a day  gabapentin 100 milliGRAM(s) Oral at bedtime  glucagon  Injectable 1 milliGRAM(s) IntraMuscular once  insulin lispro (ADMELOG) corrective regimen sliding scale   SubCutaneous three times a day before meals  levETIRAcetam 500 milliGRAM(s) Oral two times a day  melatonin 3 milliGRAM(s) Oral at bedtime PRN  mirtazapine 7.5 milliGRAM(s) Oral daily  polyethylene glycol 3350 17 Gram(s) Oral daily  rivastigmine patch  9.5 mG/24 Hr(s) 1 Patch Transdermal every 24 hours  senna 2 Tablet(s) Oral at bedtime  traZODone 100 milliGRAM(s) Oral at bedtime    Vital Signs Last 24 Hrs  T(C): 36.6 (23 Jan 2025 04:39), Max: 36.8 (22 Jan 2025 17:40)  T(F): 97.9 (23 Jan 2025 04:39), Max: 98.2 (22 Jan 2025 17:40)  HR: 70 (23 Jan 2025 04:39) (65 - 86)  BP: 142/78 (23 Jan 2025 04:39) (142/78 - 163/101)  BP(mean): 109 (23 Jan 2025 04:39) (104 - 118)  RR: 18 (23 Jan 2025 04:39) (17 - 18)  SpO2: 97% (23 Jan 2025 04:39) (96% - 99%)    Parameters below as of 23 Jan 2025 04:39  Patient On (Oxygen Delivery Method): room air      Physical Exam  GENERAL: well-developed, well-nourished, in no acute distress    NEUROLOGIC:  Mental status: AOx  Language: Speech with intact fluency , naming , repetition , comprehension, absent dysarthria  Cranial nerves:   II: Visual fields are full to confrontation  III, IV, VI: Extraocular movements intact, no noticeable nystagmus, pupils equally round and reactive to light  V:  Facial sensation V1-V3 equal and intact   VII: Face is symmetric with normal eye closure and smile  VIII: Hearing is intact bilaterally intact  IX, X: Uvula is midline and soft palate rises symmetrically  XI: Head turning and shoulder shrug are intact ad symmetric  XII: Tongue protrudes midline  Motor:  - shoulder abduction     R 5/5     L 5/5  - elbow flexion     R 5/5     L 5/5  - elbow extension     R 5/5     L 5/5  - wrist flexion     R 5/5     L 5/5  - wrist extension     R 5/5     L 5/5  - finger flexion     R 5/5     L 5/5  - finger extension     R 5/5     L 5/5  - finger abduction     R 5/5     L 5/5  - hip flexion     R 5/5     L 5/5  - knee flexion     R 5/5     L 5/5  - knee extension     R 5/5     L 5/5  - dorsiflexion     R 5/5     L 5/5  - plantarflexion     R 5/5     L 5/5  - pronator drift     absent / present L / R  Sensation: Intact to light touch bilaterally. No neglect or extinction on double simultaneous testing.  Coordination: No dysmetria on finger-to-nose and heel-to-shin bilaterally, rapid alternating movements intact and symmetric  Reflexes:   - biceps     R 2+     L 2+  - triceps     R 2+     L 2+  - brachioradialis     R 2+     L 2+  - patellar     R 2+     L 2+  - Achilles     R 2+     L 2+  - Babinski     R downgoing / upgoing    L downgoing / upgoing  - Neal     R absent / present     L absent / present  Gait: Narrow gait and steady, Romberg sign negative / Deferred at this encounter    NIHSS:     Labs:  CBC Full  -  ( 23 Jan 2025 05:56 )  WBC Count : 7.26 K/uL  RBC Count : 4.60 M/uL  Hemoglobin : 15.7 g/dL  Hematocrit : 45.6 %  Platelet Count - Automated : 133 K/uL  Mean Cell Volume : 99.1 fL  Mean Cell Hemoglobin : 34.1 pg  Mean Cell Hemoglobin Concentration : 34.4 g/dL  Auto Neutrophil # : 4.27 K/uL  Auto Lymphocyte # : 2.18 K/uL  Auto Monocyte # : 0.64 K/uL  Auto Eosinophil # : 0.11 K/uL  Auto Basophil # : 0.03 K/uL  Auto Neutrophil % : 58.9 %  Auto Lymphocyte % : 30.0 %  Auto Monocyte % : 8.8 %  Auto Eosinophil % : 1.5 %  Auto Basophil % : 0.4 %    01-23    145  |  106  |  10  ----------------------------<  66[L]  4.1   |  29  |  1.0    Ca    8.5      23 Jan 2025 05:56  Phos  3.4     01-23  Mg     1.8     01-23    TPro  5.7[L]  /  Alb  3.8  /  TBili  0.9  /  DBili  x   /  AST  12  /  ALT  <5  /  AlkPhos  79  01-23    LIVER FUNCTIONS - ( 23 Jan 2025 05:56 )  Alb: 3.8 g/dL / Pro: 5.7 g/dL / ALK PHOS: 79 U/L / ALT: <5 U/L / AST: 12 U/L / GGT: x             Urinalysis Basic - ( 23 Jan 2025 05:56 )    Color: x / Appearance: x / SG: x / pH: x  Gluc: 66 mg/dL / Ketone: x  / Bili: x / Urobili: x   Blood: x / Protein: x / Nitrite: x   Leuk Esterase: x / RBC: x / WBC x   Sq Epi: x / Non Sq Epi: x / Bacteria: x        NEUROLOGIC STUDIES/LABWORK REVIEWED    [] CTH -   [] CTA head/Neck -   [] CTP -   [] MRI brain noncontrast -   [] TTE - EF [%] PFO [absent / present] Significant valvular disease [absent / present]  [] SHANNON - EF [%] PFO [absent / present] Significant valvular disease [absent / present]    CORE MEASURES   [] HbA1c  [] LDL  [] TSH    OTHER STUDIES/LABWORK REVIEWED   Neurology Progress Note    Interval History: blood pressure within goal overnight, patient had a fall after trying to walk, patient reported no headstrike, however CTH performed which did not show acute bleed. Patient awoke thinking he was playing [employer] in [place] (which he reports he has done in his past)    Medications:  acetaminophen     Tablet .. 650 milliGRAM(s) Oral every 6 hours PRN  amLODIPine   Tablet 5 milliGRAM(s) Oral daily  aspirin  chewable 81 milliGRAM(s) Oral daily  atorvastatin 40 milliGRAM(s) Oral at bedtime  carbidopa/levodopa  25/100 2 Tablet(s) Oral three times a day  carbidopa/levodopa CR 50/200 1 Tablet(s) Oral <User Schedule>  carvedilol 3.125 milliGRAM(s) Oral every 12 hours  chlorhexidine 2% Cloths 1 Application(s) Topical <User Schedule>  clopidogrel Tablet 75 milliGRAM(s) Oral daily  dextrose 5%. 1000 milliLiter(s) IV Continuous <Continuous>  dextrose 5%. 1000 milliLiter(s) IV Continuous <Continuous>  dextrose 50% Injectable 25 Gram(s) IV Push once  dextrose 50% Injectable 12.5 Gram(s) IV Push once  dextrose 50% Injectable 25 Gram(s) IV Push once  dextrose Oral Gel 15 Gram(s) Oral once PRN  enoxaparin Injectable 40 milliGRAM(s) SubCutaneous every 24 hours  entacapone 200 milliGRAM(s) Oral three times a day  gabapentin 100 milliGRAM(s) Oral at bedtime  glucagon  Injectable 1 milliGRAM(s) IntraMuscular once  insulin lispro (ADMELOG) corrective regimen sliding scale   SubCutaneous three times a day before meals  levETIRAcetam 500 milliGRAM(s) Oral two times a day  melatonin 3 milliGRAM(s) Oral at bedtime PRN  mirtazapine 7.5 milliGRAM(s) Oral daily  polyethylene glycol 3350 17 Gram(s) Oral daily  rivastigmine patch  9.5 mG/24 Hr(s) 1 Patch Transdermal every 24 hours  senna 2 Tablet(s) Oral at bedtime  traZODone 100 milliGRAM(s) Oral at bedtime    Vital Signs Last 24 Hrs  T(C): 36.6 (23 Jan 2025 04:39), Max: 36.8 (22 Jan 2025 17:40)  T(F): 97.9 (23 Jan 2025 04:39), Max: 98.2 (22 Jan 2025 17:40)  HR: 70 (23 Jan 2025 04:39) (65 - 86)  BP: 142/78 (23 Jan 2025 04:39) (142/78 - 163/101)  BP(mean): 109 (23 Jan 2025 04:39) (104 - 118)  RR: 18 (23 Jan 2025 04:39) (17 - 18)  SpO2: 97% (23 Jan 2025 04:39) (96% - 99%)    Parameters below as of 23 Jan 2025 04:39  Patient On (Oxygen Delivery Method): room air      Physical Exam  GENERAL: well-developed, well-nourished, in no acute distress    NEUROLOGIC:  Mental status: AOx3  Language: Speech with intact fluency , repetition , comprehension, / present anomic aphasia improved from yesterday (able to name most objects), present dysarthria  Cranial nerves:   II: Visual fields are full to confrontation  III, IV, VI: Extraocular movements intact, no noticeable nystagmus, pupils equally round and reactive to light  V:  Facial sensation V1-V3 equal and intact  VII: R minor facial droop  VIII: Hearing is intact bilaterally intact  IX, X: Uvula is midline and soft palate rises symmetrically  XI: Head turning and shoulder shrug are intact ad symmetric  XII: Tongue deviated to R side, able to center his tongue and move it to the L side  Motor:  - shoulder abduction     R 5/5     L 5/5  - elbow flexion     R 5/5     L 5/5  - elbow extension     R 5/5     L 5/5  - wrist flexion     R 5/5     L 5/5  - wrist extension     R 5/5     L 5/5  - finger flexion     R 5/5     L 5/5  - finger extension     R 5/5     L 5/5  - thumb opposition     R 5/5     L 5/5  - hip flexion     R 5/5     L 5/5  - knee flexion     R 5/5     L 5/5  - knee extension     R 5/5     L 5/5  - dorsiflexion     R 5/5     L 5/5  - plantarflexion     R 5/5     L 5/5  Sensation: Intact to light touch bilaterally (initially said reduced sensation R face, UE, LE, however reported sensation symmetrical on retesting 1 hour later). No neglect or extinction on double simultaneous testing.  Coordination: No dysmetria on finger-to-nose and heel-to-shin bilaterally, rapid alternating movements intact and symmetric  Reflexes:   - biceps     R 2+     L 2+  - triceps     R 2+     L 2+  - brachioradialis     R 2+     L 2+  - patellar     R 3+     L 3+  - Achilles     R 2+     L 2+  - Babinski     R downgoing    L downgoing  Movement: resting tremor low amplitude low frequency R hand with postural component, rigidity R >L, bradykinesia on hand opening/closing and foot tapping bilateral   Gait: Deferred at this encounter    NIHSS: 3 (R minor facial droop 1, mild anomic aphasia 1, mild dysarthria 1)    Labs:  CBC Full  -  ( 23 Jan 2025 05:56 )  WBC Count : 7.26 K/uL  RBC Count : 4.60 M/uL  Hemoglobin : 15.7 g/dL  Hematocrit : 45.6 %  Platelet Count - Automated : 133 K/uL  Mean Cell Volume : 99.1 fL  Mean Cell Hemoglobin : 34.1 pg  Mean Cell Hemoglobin Concentration : 34.4 g/dL  Auto Neutrophil # : 4.27 K/uL  Auto Lymphocyte # : 2.18 K/uL  Auto Monocyte # : 0.64 K/uL  Auto Eosinophil # : 0.11 K/uL  Auto Basophil # : 0.03 K/uL  Auto Neutrophil % : 58.9 %  Auto Lymphocyte % : 30.0 %  Auto Monocyte % : 8.8 %  Auto Eosinophil % : 1.5 %  Auto Basophil % : 0.4 %    01-23    145  |  106  |  10  ----------------------------<  66[L]  4.1   |  29  |  1.0    Ca    8.5      23 Jan 2025 05:56  Phos  3.4     01-23  Mg     1.8     01-23    TPro  5.7[L]  /  Alb  3.8  /  TBili  0.9  /  DBili  x   /  AST  12  /  ALT  <5  /  AlkPhos  79  01-23    LIVER FUNCTIONS - ( 23 Jan 2025 05:56 )  Alb: 3.8 g/dL / Pro: 5.7 g/dL / ALK PHOS: 79 U/L / ALT: <5 U/L / AST: 12 U/L / GGT: x             Urinalysis Basic - ( 23 Jan 2025 05:56 )    Color: x / Appearance: x / SG: x / pH: x  Gluc: 66 mg/dL / Ketone: x  / Bili: x / Urobili: x   Blood: x / Protein: x / Nitrite: x   Leuk Esterase: x / RBC: x / WBC x   Sq Epi: x / Non Sq Epi: x / Bacteria: x        NEUROLOGIC STUDIES/LABWORK REVIEWED    [1/15] HCT: There are chronic lacunar infarcts involving the left basal ganglia as   well as the bilateral thalami. There is confluent periventricular as well   as subcortical white matter hypodensity. There is no intraparenchymal   hematoma, mass effect or midline shift. No abnormal extra-axial fluid   collections are present.  [1/22] CTA head/Neck:   1.  Severe stenosis left MCA proximal M2   2.  Patent carotid bifurcations  3.  Moderate narrowing right proximal vertebral artery.  [1/21] MRI brain w/out:   1.  Acute white matter infarct left corona radiata   2.  Moderate microvascular ischemic changes.   Multiple foci of susceptibility artifact demonstrated predominately within the basal ganglia and cerebellum noted. This can correlate with hypertension.   [1/21] TTE EF 69%   [1/22] repeat CTH Compared with the CT scan of 1/15/2025, there is a new lucency in the left corona radiata, adjacent to the left lateral ventricle, consistent   with an acute infarction, as noted on the earlier reported MRI of   1/21/2025 (2/20).  Chronic lacunar infarcts in the right and left thalamus and left basal ganglia are again noted,  Carotid siphon calcifications are noted.  There are patchy hypodensities throughout the hemispheric white matter without mass effect compatible with chronic microvascular changes.    CORE MEASURES   [4.9] HbA1c  [101] LDL  [2.81] TSH    OTHER STUDIES/LABWORK REVIEWED   Neurology Progress Note    Interval History: blood pressure within goal overnight, patient had a fall after trying to walk, patient reported no headstrike, however CTH performed which did not show acute bleed. Patient awoke thinking he was playing [employer] in [employer] (which he reports he has done in his past)    Medications:  acetaminophen     Tablet .. 650 milliGRAM(s) Oral every 6 hours PRN  amLODIPine   Tablet 5 milliGRAM(s) Oral daily  aspirin  chewable 81 milliGRAM(s) Oral daily  atorvastatin 40 milliGRAM(s) Oral at bedtime  carbidopa/levodopa  25/100 2 Tablet(s) Oral three times a day  carbidopa/levodopa CR 50/200 1 Tablet(s) Oral <User Schedule>  carvedilol 3.125 milliGRAM(s) Oral every 12 hours  chlorhexidine 2% Cloths 1 Application(s) Topical <User Schedule>  clopidogrel Tablet 75 milliGRAM(s) Oral daily  dextrose 5%. 1000 milliLiter(s) IV Continuous <Continuous>  dextrose 5%. 1000 milliLiter(s) IV Continuous <Continuous>  dextrose 50% Injectable 25 Gram(s) IV Push once  dextrose 50% Injectable 12.5 Gram(s) IV Push once  dextrose 50% Injectable 25 Gram(s) IV Push once  dextrose Oral Gel 15 Gram(s) Oral once PRN  enoxaparin Injectable 40 milliGRAM(s) SubCutaneous every 24 hours  entacapone 200 milliGRAM(s) Oral three times a day  gabapentin 100 milliGRAM(s) Oral at bedtime  glucagon  Injectable 1 milliGRAM(s) IntraMuscular once  insulin lispro (ADMELOG) corrective regimen sliding scale   SubCutaneous three times a day before meals  levETIRAcetam 500 milliGRAM(s) Oral two times a day  melatonin 3 milliGRAM(s) Oral at bedtime PRN  mirtazapine 7.5 milliGRAM(s) Oral daily  polyethylene glycol 3350 17 Gram(s) Oral daily  rivastigmine patch  9.5 mG/24 Hr(s) 1 Patch Transdermal every 24 hours  senna 2 Tablet(s) Oral at bedtime  traZODone 100 milliGRAM(s) Oral at bedtime    Vital Signs Last 24 Hrs  T(C): 36.6 (23 Jan 2025 04:39), Max: 36.8 (22 Jan 2025 17:40)  T(F): 97.9 (23 Jan 2025 04:39), Max: 98.2 (22 Jan 2025 17:40)  HR: 70 (23 Jan 2025 04:39) (65 - 86)  BP: 142/78 (23 Jan 2025 04:39) (142/78 - 163/101)  BP(mean): 109 (23 Jan 2025 04:39) (104 - 118)  RR: 18 (23 Jan 2025 04:39) (17 - 18)  SpO2: 97% (23 Jan 2025 04:39) (96% - 99%)    Parameters below as of 23 Jan 2025 04:39  Patient On (Oxygen Delivery Method): room air      Physical Exam  GENERAL: well-developed, well-nourished, in no acute distress    NEUROLOGIC:  Mental status: AOx3  Language: Speech with intact fluency , repetition , comprehension, / present anomic aphasia improved from yesterday (able to name most objects), present dysarthria  Cranial nerves:   II: Visual fields are full to confrontation  III, IV, VI: Extraocular movements intact, no noticeable nystagmus, pupils equally round and reactive to light  V:  Facial sensation V1-V3 equal and intact  VII: R minor facial droop  VIII: Hearing is intact bilaterally intact  IX, X: Uvula is midline and soft palate rises symmetrically  XI: Head turning and shoulder shrug are intact ad symmetric  XII: Tongue deviated to R side, able to center his tongue and move it to the L side  Motor:  - shoulder abduction     R 5/5     L 5/5  - elbow flexion     R 5/5     L 5/5  - elbow extension     R 5/5     L 5/5  - wrist flexion     R 5/5     L 5/5  - wrist extension     R 5/5     L 5/5  - finger flexion     R 5/5     L 5/5  - finger extension     R 5/5     L 5/5  - thumb opposition     R 5/5     L 5/5  - hip flexion     R 5/5     L 5/5  - knee flexion     R 5/5     L 5/5  - knee extension     R 5/5     L 5/5  - dorsiflexion     R 5/5     L 5/5  - plantarflexion     R 5/5     L 5/5  Sensation: Intact to light touch bilaterally (initially said reduced sensation R face, UE, LE, however reported sensation symmetrical on retesting 1 hour later). No neglect or extinction on double simultaneous testing.  Coordination: No dysmetria on finger-to-nose and heel-to-shin bilaterally, rapid alternating movements intact and symmetric  Reflexes:   - biceps     R 2+     L 2+  - triceps     R 2+     L 2+  - brachioradialis     R 2+     L 2+  - patellar     R 3+     L 3+  - Achilles     R 2+     L 2+  - Babinski     R downgoing    L downgoing  Movement: resting tremor low amplitude low frequency R hand with postural component, rigidity R >L, bradykinesia on hand opening/closing and foot tapping bilateral   Gait: Deferred at this encounter    NIHSS: 3 (R minor facial droop 1, mild anomic aphasia 1, mild dysarthria 1)    Labs:  CBC Full  -  ( 23 Jan 2025 05:56 )  WBC Count : 7.26 K/uL  RBC Count : 4.60 M/uL  Hemoglobin : 15.7 g/dL  Hematocrit : 45.6 %  Platelet Count - Automated : 133 K/uL  Mean Cell Volume : 99.1 fL  Mean Cell Hemoglobin : 34.1 pg  Mean Cell Hemoglobin Concentration : 34.4 g/dL  Auto Neutrophil # : 4.27 K/uL  Auto Lymphocyte # : 2.18 K/uL  Auto Monocyte # : 0.64 K/uL  Auto Eosinophil # : 0.11 K/uL  Auto Basophil # : 0.03 K/uL  Auto Neutrophil % : 58.9 %  Auto Lymphocyte % : 30.0 %  Auto Monocyte % : 8.8 %  Auto Eosinophil % : 1.5 %  Auto Basophil % : 0.4 %    01-23    145  |  106  |  10  ----------------------------<  66[L]  4.1   |  29  |  1.0    Ca    8.5      23 Jan 2025 05:56  Phos  3.4     01-23  Mg     1.8     01-23    TPro  5.7[L]  /  Alb  3.8  /  TBili  0.9  /  DBili  x   /  AST  12  /  ALT  <5  /  AlkPhos  79  01-23    LIVER FUNCTIONS - ( 23 Jan 2025 05:56 )  Alb: 3.8 g/dL / Pro: 5.7 g/dL / ALK PHOS: 79 U/L / ALT: <5 U/L / AST: 12 U/L / GGT: x             Urinalysis Basic - ( 23 Jan 2025 05:56 )    Color: x / Appearance: x / SG: x / pH: x  Gluc: 66 mg/dL / Ketone: x  / Bili: x / Urobili: x   Blood: x / Protein: x / Nitrite: x   Leuk Esterase: x / RBC: x / WBC x   Sq Epi: x / Non Sq Epi: x / Bacteria: x        NEUROLOGIC STUDIES/LABWORK REVIEWED    [1/15] HCT: There are chronic lacunar infarcts involving the left basal ganglia as   well as the bilateral thalami. There is confluent periventricular as well   as subcortical white matter hypodensity. There is no intraparenchymal   hematoma, mass effect or midline shift. No abnormal extra-axial fluid   collections are present.  [1/22] CTA head/Neck:   1.  Severe stenosis left MCA proximal M2   2.  Patent carotid bifurcations  3.  Moderate narrowing right proximal vertebral artery.  [1/21] MRI brain w/out:   1.  Acute white matter infarct left corona radiata   2.  Moderate microvascular ischemic changes.   Multiple foci of susceptibility artifact demonstrated predominately within the basal ganglia and cerebellum noted. This can correlate with hypertension.   [1/21] TTE EF 69%   [1/22] repeat CTH Compared with the CT scan of 1/15/2025, there is a new lucency in the left corona radiata, adjacent to the left lateral ventricle, consistent   with an acute infarction, as noted on the earlier reported MRI of   1/21/2025 (2/20).  Chronic lacunar infarcts in the right and left thalamus and left basal ganglia are again noted,  Carotid siphon calcifications are noted.  There are patchy hypodensities throughout the hemispheric white matter without mass effect compatible with chronic microvascular changes.  [1/23] repeat MRI  1.  Stable exam compared to 1/21/2025:  2.  Stable acute infarct within the left corona radiata/basal ganglia. No new infarct demonstrated.  3.  Stable moderate chronic microvascular changes and multiple chronic lacunar infarcts.    CORE MEASURES   [4.9] HbA1c  [101] LDL  [2.81] TSH    OTHER STUDIES/LABWORK REVIEWED

## 2025-01-23 NOTE — PROGRESS NOTE ADULT - ASSESSMENT
75 y/o M with a PMHx of Parkinson's disease, HTN, hx ischemic stroke, DM II, dementia and chronic back pain, Hx of seizures on Keppra, admitted to medicine for AMS, Neurology was consulted for AMS despite lack of metabolic derangements. Evaluated by general neurology on 1/21 and noted to have R facial droop, brisk R patellar reflex, positive R babinski reflex, a trembling in his lower left lip, and a left-handed resting tremor with a postural component. CTH on 1/15 negative. MRI brain non con obtained overnight showing acute L corona radiata infarct. Neurovascular to follow today. Exam with R sided deficits and expressive aphasia however new onset L tongue deviation and mild dysarthria. Chronic L resting tremor noted.     Impression: 75 y/o M with L bhatt radiata infract correlating with exam which is significant for R facial droop and expressive aphasia however today's exam differing from yesterdays exam as pt with new dysarthria and L tongue deviation. Will obtain stroke work up to determine etiology.     RECS:  -CTA head and neck   -Continue aspirin 81mg daily for now   -LDL, TSH, A1C   -EP for loop recorder   -dysphagia screening   -TTE completed and insignificant  -speech therapy, PT/OT   - atorvastatin 80mg daily  - q4hr stroke neuro checks and vitals  - -160  -medical management per primary team     Discussed with Dr. Locke      75 y/o M with a PMHx of Parkinson's disease, HTN, ischemic stroke (1998 and 2016 with residual expressive aphasia and cognitive deficits), T2DM, gout, dementia and chronic back pain, seizures on Keppra, admitted to medicine for AMS, Neurology was consulted for AMS despite lack of metabolic derangements. CTH on 1/15 negative. Evaluated by general neurology on 1/21 and noted to have R facial droop, brisk R patellar reflex, positive R babinski reflex, a trembling in his lower left lip, and a left-handed resting tremor with a postural component.  MRI brain 1/21 showed acute L corona radiata infarct. On follow-up exam on 1/22 was noted to have new onset L tongue deviation and mild dysarthria. Transferred to stroke service for stroke management and repeat [facility]. Considering neuroimaging suspect L corona radiata stroke secondary to ICAD (R M2 stenosis). However pending repeat MRI to evaluate for possible R medullarly stroke causing R tongue deviation.     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 lewy body dementia v PD  #anxiety/depression  #insomnia  - c/w home meds: carbidopa/levodopa, entacapone, rivastigmine patch (submitted nonformulary- f/u if accepted)  - c/w mirtazapine 7.5mg qd   - c/w trazodone 100mg qhs  - melatonin 3mg nightly prn     #history of seizures  - keppra 500mg twice daily     #back pain  - c/w gabapentin 100mg qhs     #constipation  - miralax 17g daily  - senna 2 tablets nightly  --------------------------------------------------  #prophylactic measures  - DVT prophylaxis: lovenox / SCDs  - GI prophylaxis: not indicated at this time  - Diet: DASH/TLC / consistent carbohydrate  --------------------------------------------------  #disposition -   - PT / OT / Physiatry advises discharge to: pending stroke work up   - patient planning to discharge to: pending        75 y/o M with a PMHx of Parkinson's disease, HTN, ischemic stroke (1998 and 2016 with residual expressive aphasia and cognitive deficits), T2DM, gout, dementia and chronic back pain, seizures on Keppra, admitted to medicine for AMS, Neurology was consulted for AMS despite lack of metabolic derangements. CTH on 1/15 negative. Evaluated by general neurology on 1/21 and noted to have R facial droop, brisk R patellar reflex, positive R babinski reflex, a trembling in his lower left lip, and a left-handed resting tremor with a postural component.  MRI brain 1/21 showed acute L corona radiata infarct. On follow-up exam on 1/22 was noted to have new onset L tongue deviation and mild dysarthria. Transferred to stroke service for stroke management and repeat MRH. Considering neuroimaging suspect L corona radiata stroke secondary to ICAD (R M2 stenosis). Repeat MRH pursued to rule out new stroke considering new onset R tongue deviation however did not show acute findings suggesting that the tongue deviation may be a chronic finding that was not previously known    #L corona radiata stroke secondary to ICAD (R M2 stenosis)  #possible stroke R medulla  - ILR  - antiplatelets/anticoagulants: aspirin 81mg daily , clopidogrel 75mg daily > discharge on dual antiplatelet therapy 90 days  - antilipemics: atorvastatin 80mg nightly   - SBP goal: 120-160  - q8h neuro and vitals checks    #HTN  - amlodipie 5mg daily  - carvedilol 3.125mg q12h    #DLD  - atorvastatin above    #DM  - home antidiabetics > holding  - insulin regimen: insulin sliding scale    #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 lewy body dementia v PD  #anxiety/depression  #insomnia  - c/w home meds: carbidopa/levodopa, entacapone, rivastigmine patch (submitted nonformulary- f/u if accepted)  - c/w mirtazapine 7.5mg qd   - c/w trazodone 100mg qhs  - melatonin 3mg nightly prn     #history of seizures  - keppra 500mg twice daily     #back pain  - c/w gabapentin 100mg qhs     #constipation  - miralax 17g daily  - senna 2 tablets nightly  --------------------------------------------------  #prophylactic measures  - DVT prophylaxis: lovenox / SCDs  - GI prophylaxis: not indicated at this time  - Diet: DASH/TLC / consistent carbohydrate  --------------------------------------------------  #disposition -   - PT / OT / Physiatry advises discharge to: pending stroke work up   - patient planning to discharge to: pending

## 2025-01-23 NOTE — PROGRESS NOTE ADULT - ASSESSMENT
74 year old Male, PMH of Parkinson's, HTN, hx ischemic CVA x2, type 2 DM, dementia and chronic back pain, Hx of seizures on keppra, BIBA from [facility]  for generalized weakness, confusion, decreased PO intake and multiple witnessed falls over the past few days.    #Acute ischemic stroke  #Recurrent falls associated w/ confusion weakness  #Expressive aphasia  #Hx parkinson's disease   #Hx dementia  #Hx ischemic CVA x2  #Hx seizure disorder   #Lt tongue deviation  - Trauma w/u negative: CTH, CXR, Xray pelvis   - Infectious w/u negative: afebrile, no leukocytosis, UA(-), trop 11> 9  - orthostatics: negative   - TSH, vit D wnl  - c/w home meds: carbidopa/levodopa, entacapone, rivastigmine patch (submitted nonformulary- f/u if accepted)   -c/w keppra 500mg bid  - c/w gabapentin 100mg qhs   - c/w mirtazapine 7.5mg qd   - c/w trazodone 100mg qhs  - EEG: Findings consistent with mild diffuse electrocerebral dysfunction secondary to nonspecific etiology  - Echo: EF 69%, wnl  - remote telemetry due to stroke  Neuro following   - MRI showing acute stroke of L corona radiata  - CTA Head/Neck:  Severe stenosis left MCA proximal M2. Moderate narrowing right proximal vertebral artery.  - repeat MRI pending per Neuro   - started high dose statin  - c/w aspirin, added plavix  - , TSH 2.81, A1C wnl  - EP for loop recorder  - Speech language pathology evaluated - mod to severe dysarthria, consistent with PD  - PT/OT   - q4 stroke neuro checks and vitals  - SBP goal 120-160    #HTN  #h/o DM2  - decreased coreg to 3.25mg BID   - c/w amlodipine 5mg qd for now  - A1C 4.9  - Monitor     Management per Neuro, Medicine for comanagement     Total time spent to complete patient's bedside assessment, review medical chart, discuss medical plan of care with covering medical team was more than 35 minutes  with >50% of time spent face to face with patient, discussion with patient/family and/or coordination of care. My note supersedes them medical resident note in case of discrepancy.    Snehal Taylor, DO

## 2025-01-23 NOTE — PROGRESS NOTE ADULT - ASSESSMENT
Imp: Rehab of acute L stroke / R HP, aphasia, right facial droop, dysarthria / Parkinson's disease,  HTN, hx ischemic CVA x2, type 2 DM, dementia and chronic back pain, Hx of seizures   Plan: continue bedside therapy as tolerated            hospital course complicated by new acute L stroke           Pt screened for 4a rehab            will follow

## 2025-01-24 ENCOUNTER — TRANSCRIPTION ENCOUNTER (OUTPATIENT)
Age: 75
End: 2025-01-24

## 2025-01-24 ENCOUNTER — INPATIENT (INPATIENT)
Facility: HOSPITAL | Age: 75
LOS: 18 days | Discharge: HOME HEALTH | DRG: 57 | End: 2025-02-12
Attending: PHYSICAL MEDICINE & REHABILITATION | Admitting: PHYSICAL MEDICINE & REHABILITATION
Payer: MEDICARE

## 2025-01-24 VITALS
DIASTOLIC BLOOD PRESSURE: 97 MMHG | TEMPERATURE: 98 F | HEART RATE: 76 BPM | SYSTOLIC BLOOD PRESSURE: 160 MMHG | OXYGEN SATURATION: 99 % | RESPIRATION RATE: 18 BRPM

## 2025-01-24 VITALS
SYSTOLIC BLOOD PRESSURE: 159 MMHG | RESPIRATION RATE: 19 BRPM | HEART RATE: 81 BPM | DIASTOLIC BLOOD PRESSURE: 97 MMHG | WEIGHT: 149.69 LBS | TEMPERATURE: 98 F

## 2025-01-24 DIAGNOSIS — Z90.49 ACQUIRED ABSENCE OF OTHER SPECIFIED PARTS OF DIGESTIVE TRACT: Chronic | ICD-10-CM

## 2025-01-24 DIAGNOSIS — I63.9 CEREBRAL INFARCTION, UNSPECIFIED: ICD-10-CM

## 2025-01-24 LAB
ANION GAP SERPL CALC-SCNC: 9 MMOL/L — SIGNIFICANT CHANGE UP (ref 7–14)
BUN SERPL-MCNC: 15 MG/DL — SIGNIFICANT CHANGE UP (ref 10–20)
CALCIUM SERPL-MCNC: 8.6 MG/DL — SIGNIFICANT CHANGE UP (ref 8.4–10.4)
CHLORIDE SERPL-SCNC: 106 MMOL/L — SIGNIFICANT CHANGE UP (ref 98–110)
CO2 SERPL-SCNC: 27 MMOL/L — SIGNIFICANT CHANGE UP (ref 17–32)
CREAT SERPL-MCNC: 1 MG/DL — SIGNIFICANT CHANGE UP (ref 0.7–1.5)
EGFR: 80 ML/MIN/1.73M2 — SIGNIFICANT CHANGE UP
GLUCOSE BLDC GLUCOMTR-MCNC: 104 MG/DL — HIGH (ref 70–99)
GLUCOSE BLDC GLUCOMTR-MCNC: 90 MG/DL — SIGNIFICANT CHANGE UP (ref 70–99)
GLUCOSE BLDC GLUCOMTR-MCNC: 94 MG/DL — SIGNIFICANT CHANGE UP (ref 70–99)
GLUCOSE BLDC GLUCOMTR-MCNC: 98 MG/DL — SIGNIFICANT CHANGE UP (ref 70–99)
GLUCOSE SERPL-MCNC: 77 MG/DL — SIGNIFICANT CHANGE UP (ref 70–99)
HCT VFR BLD CALC: 46.1 % — SIGNIFICANT CHANGE UP (ref 42–52)
HGB BLD-MCNC: 15.9 G/DL — SIGNIFICANT CHANGE UP (ref 14–18)
MAGNESIUM SERPL-MCNC: 1.8 MG/DL — SIGNIFICANT CHANGE UP (ref 1.8–2.4)
MCHC RBC-ENTMCNC: 33.5 PG — HIGH (ref 27–31)
MCHC RBC-ENTMCNC: 34.5 G/DL — SIGNIFICANT CHANGE UP (ref 32–37)
MCV RBC AUTO: 97.3 FL — HIGH (ref 80–94)
NRBC # BLD: 0 /100 WBCS — SIGNIFICANT CHANGE UP (ref 0–0)
NRBC BLD-RTO: 0 /100 WBCS — SIGNIFICANT CHANGE UP (ref 0–0)
PHOSPHATE SERPL-MCNC: 2.7 MG/DL — SIGNIFICANT CHANGE UP (ref 2.1–4.9)
PLATELET # BLD AUTO: 145 K/UL — SIGNIFICANT CHANGE UP (ref 130–400)
PMV BLD: 10 FL — SIGNIFICANT CHANGE UP (ref 7.4–10.4)
POTASSIUM SERPL-MCNC: 4 MMOL/L — SIGNIFICANT CHANGE UP (ref 3.5–5)
POTASSIUM SERPL-SCNC: 4 MMOL/L — SIGNIFICANT CHANGE UP (ref 3.5–5)
RBC # BLD: 4.74 M/UL — SIGNIFICANT CHANGE UP (ref 4.7–6.1)
RBC # FLD: 11.3 % — LOW (ref 11.5–14.5)
SODIUM SERPL-SCNC: 142 MMOL/L — SIGNIFICANT CHANGE UP (ref 135–146)
WBC # BLD: 8.36 K/UL — SIGNIFICANT CHANGE UP (ref 4.8–10.8)
WBC # FLD AUTO: 8.36 K/UL — SIGNIFICANT CHANGE UP (ref 4.8–10.8)

## 2025-01-24 PROCEDURE — 99232 SBSQ HOSP IP/OBS MODERATE 35: CPT

## 2025-01-24 PROCEDURE — 92612 ENDOSCOPY SWALLOW (FEES) VID: CPT | Mod: GN

## 2025-01-24 PROCEDURE — 92507 TX SP LANG VOICE COMM INDIV: CPT | Mod: GN

## 2025-01-24 PROCEDURE — 36415 COLL VENOUS BLD VENIPUNCTURE: CPT

## 2025-01-24 PROCEDURE — 97110 THERAPEUTIC EXERCISES: CPT | Mod: GO

## 2025-01-24 PROCEDURE — 97116 GAIT TRAINING THERAPY: CPT | Mod: GP

## 2025-01-24 PROCEDURE — 97163 PT EVAL HIGH COMPLEX 45 MIN: CPT | Mod: GP

## 2025-01-24 PROCEDURE — 85025 COMPLETE CBC W/AUTO DIFF WBC: CPT

## 2025-01-24 PROCEDURE — 97140 MANUAL THERAPY 1/> REGIONS: CPT | Mod: GO

## 2025-01-24 PROCEDURE — 97167 OT EVAL HIGH COMPLEX 60 MIN: CPT | Mod: GO

## 2025-01-24 PROCEDURE — 70450 CT HEAD/BRAIN W/O DYE: CPT | Mod: MC

## 2025-01-24 PROCEDURE — 92597 ORAL SPEECH DEVICE EVAL: CPT | Mod: GN

## 2025-01-24 PROCEDURE — 97535 SELF CARE MNGMENT TRAINING: CPT | Mod: GO

## 2025-01-24 PROCEDURE — 33285 INSJ SUBQ CAR RHYTHM MNTR: CPT

## 2025-01-24 PROCEDURE — 90837 PSYTX W PT 60 MINUTES: CPT

## 2025-01-24 PROCEDURE — 92526 ORAL FUNCTION THERAPY: CPT | Mod: GN

## 2025-01-24 PROCEDURE — 97112 NEUROMUSCULAR REEDUCATION: CPT | Mod: GO

## 2025-01-24 PROCEDURE — 97129 THER IVNTJ 1ST 15 MIN: CPT | Mod: GO

## 2025-01-24 PROCEDURE — 90834 PSYTX W PT 45 MINUTES: CPT

## 2025-01-24 PROCEDURE — 90832 PSYTX W PT 30 MINUTES: CPT

## 2025-01-24 PROCEDURE — 80053 COMPREHEN METABOLIC PANEL: CPT

## 2025-01-24 PROCEDURE — 85027 COMPLETE CBC AUTOMATED: CPT

## 2025-01-24 PROCEDURE — 83735 ASSAY OF MAGNESIUM: CPT

## 2025-01-24 PROCEDURE — 97530 THERAPEUTIC ACTIVITIES: CPT | Mod: GP

## 2025-01-24 PROCEDURE — 82962 GLUCOSE BLOOD TEST: CPT

## 2025-01-24 PROCEDURE — 92610 EVALUATE SWALLOWING FUNCTION: CPT | Mod: GN

## 2025-01-24 PROCEDURE — 92523 SPEECH SOUND LANG COMPREHEN: CPT | Mod: GN

## 2025-01-24 RX ORDER — MAGNESIUM HYDROXIDE 400 MG/5ML
30 SUSPENSION, ORAL (FINAL DOSE FORM) ORAL DAILY
Refills: 0 | Status: DISCONTINUED | OUTPATIENT
Start: 2025-01-24 | End: 2025-02-12

## 2025-01-24 RX ORDER — GLUCAGON 3 MG/1
1 POWDER NASAL ONCE
Refills: 0 | Status: DISCONTINUED | OUTPATIENT
Start: 2025-01-24 | End: 2025-02-12

## 2025-01-24 RX ORDER — ATORVASTATIN CALCIUM 80 MG/1
80 TABLET, FILM COATED ORAL AT BEDTIME
Refills: 0 | Status: DISCONTINUED | OUTPATIENT
Start: 2025-01-24 | End: 2025-02-12

## 2025-01-24 RX ORDER — CARBIDOPA/LEVODOPA 10MG-100MG
1 TABLET ORAL
Refills: 0 | Status: DISCONTINUED | OUTPATIENT
Start: 2025-01-24 | End: 2025-02-12

## 2025-01-24 RX ORDER — AMLODIPINE BESYLATE 5 MG
5 TABLET ORAL DAILY
Refills: 0 | Status: DISCONTINUED | OUTPATIENT
Start: 2025-01-24 | End: 2025-02-12

## 2025-01-24 RX ORDER — GABAPENTIN 800 MG/1
100 TABLET ORAL AT BEDTIME
Refills: 0 | Status: DISCONTINUED | OUTPATIENT
Start: 2025-01-24 | End: 2025-01-28

## 2025-01-24 RX ORDER — ACETAMINOPHEN, DIPHENHYDRAMINE HCL, PHENYLEPHRINE HCL 325; 25; 5 MG/1; MG/1; MG/1
3 TABLET ORAL AT BEDTIME
Refills: 0 | Status: DISCONTINUED | OUTPATIENT
Start: 2025-01-24 | End: 2025-02-12

## 2025-01-24 RX ORDER — SODIUM CHLORIDE 9 G/ML
1000 INJECTION, SOLUTION INTRAVENOUS
Refills: 0 | Status: DISCONTINUED | OUTPATIENT
Start: 2025-01-24 | End: 2025-02-12

## 2025-01-24 RX ORDER — ACETAMINOPHEN 160 MG/5ML
650 SUSPENSION ORAL EVERY 6 HOURS
Refills: 0 | Status: DISCONTINUED | OUTPATIENT
Start: 2025-01-24 | End: 2025-02-12

## 2025-01-24 RX ORDER — ENTACAPONE 200 MG/1
200 TABLET, FILM COATED ORAL THREE TIMES A DAY
Refills: 0 | Status: DISCONTINUED | OUTPATIENT
Start: 2025-01-24 | End: 2025-02-12

## 2025-01-24 RX ORDER — CARVEDILOL 6.25 MG
3.12 TABLET ORAL EVERY 12 HOURS
Refills: 0 | Status: DISCONTINUED | OUTPATIENT
Start: 2025-01-24 | End: 2025-01-26

## 2025-01-24 RX ORDER — ANTISEPTIC SURGICAL SCRUB 0.04 MG/ML
1 SOLUTION TOPICAL
Refills: 0 | Status: DISCONTINUED | OUTPATIENT
Start: 2025-01-24 | End: 2025-02-12

## 2025-01-24 RX ORDER — RIVASTIGMINE 4.6 MG/24H
1 PATCH, EXTENDED RELEASE TRANSDERMAL EVERY 24 HOURS
Refills: 0 | Status: DISCONTINUED | OUTPATIENT
Start: 2025-01-24 | End: 2025-02-12

## 2025-01-24 RX ORDER — DM/PSEUDOEPHED/ACETAMINOPHEN 10-30-250
15 CAPSULE ORAL ONCE
Refills: 0 | Status: DISCONTINUED | OUTPATIENT
Start: 2025-01-24 | End: 2025-02-12

## 2025-01-24 RX ORDER — TRAZODONE HCL 100 MG
100 TABLET ORAL AT BEDTIME
Refills: 0 | Status: DISCONTINUED | OUTPATIENT
Start: 2025-01-24 | End: 2025-01-28

## 2025-01-24 RX ORDER — CARBIDOPA/LEVODOPA 10MG-100MG
2 TABLET ORAL THREE TIMES A DAY
Refills: 0 | Status: DISCONTINUED | OUTPATIENT
Start: 2025-01-24 | End: 2025-02-12

## 2025-01-24 RX ORDER — ACETAMINOPHEN, DIPHENHYDRAMINE HCL, PHENYLEPHRINE HCL 325; 25; 5 MG/1; MG/1; MG/1
3 TABLET ORAL AT BEDTIME
Refills: 0 | Status: DISCONTINUED | OUTPATIENT
Start: 2025-01-24 | End: 2025-01-24

## 2025-01-24 RX ORDER — ENOXAPARIN SODIUM 100 MG/ML
40 INJECTION SUBCUTANEOUS EVERY 24 HOURS
Refills: 0 | Status: DISCONTINUED | OUTPATIENT
Start: 2025-01-24 | End: 2025-02-12

## 2025-01-24 RX ORDER — AMLODIPINE BESYLATE 5 MG
5 TABLET ORAL DAILY
Refills: 0 | Status: DISCONTINUED | OUTPATIENT
Start: 2025-01-24 | End: 2025-01-24

## 2025-01-24 RX ORDER — DM/PSEUDOEPHED/ACETAMINOPHEN 10-30-250
25 CAPSULE ORAL ONCE
Refills: 0 | Status: DISCONTINUED | OUTPATIENT
Start: 2025-01-24 | End: 2025-02-12

## 2025-01-24 RX ORDER — LEVETIRACETAM 750 MG/1
500 TABLET, FILM COATED ORAL
Refills: 0 | Status: DISCONTINUED | OUTPATIENT
Start: 2025-01-24 | End: 2025-02-12

## 2025-01-24 RX ORDER — ASPIRIN 81 MG/1
81 TABLET, COATED ORAL DAILY
Refills: 0 | Status: DISCONTINUED | OUTPATIENT
Start: 2025-01-24 | End: 2025-02-12

## 2025-01-24 RX ORDER — MAGNESIUM, ALUMINUM HYDROXIDE 200-225/5
30 SUSPENSION, ORAL (FINAL DOSE FORM) ORAL EVERY 4 HOURS
Refills: 0 | Status: DISCONTINUED | OUTPATIENT
Start: 2025-01-24 | End: 2025-02-12

## 2025-01-24 RX ORDER — CARVEDILOL 6.25 MG
3.12 TABLET ORAL EVERY 12 HOURS
Refills: 0 | Status: DISCONTINUED | OUTPATIENT
Start: 2025-01-24 | End: 2025-01-24

## 2025-01-24 RX ORDER — MIRTAZAPINE 30 MG/1
7.5 TABLET, FILM COATED ORAL DAILY
Refills: 0 | Status: DISCONTINUED | OUTPATIENT
Start: 2025-01-24 | End: 2025-02-10

## 2025-01-24 RX ORDER — SENNOSIDES 8.6 MG
2 TABLET ORAL AT BEDTIME
Refills: 0 | Status: DISCONTINUED | OUTPATIENT
Start: 2025-01-24 | End: 2025-02-12

## 2025-01-24 RX ORDER — POLYETHYLENE GLYCOL 3350 17 G/17G
17 POWDER, FOR SOLUTION ORAL DAILY
Refills: 0 | Status: DISCONTINUED | OUTPATIENT
Start: 2025-01-24 | End: 2025-02-12

## 2025-01-24 RX ORDER — INSULIN LISPRO 100/ML
VIAL (ML) SUBCUTANEOUS
Refills: 0 | Status: DISCONTINUED | OUTPATIENT
Start: 2025-01-24 | End: 2025-02-12

## 2025-01-24 RX ADMIN — ENOXAPARIN SODIUM 40 MILLIGRAM(S): 100 INJECTION SUBCUTANEOUS at 05:49

## 2025-01-24 RX ADMIN — LEVETIRACETAM 500 MILLIGRAM(S): 750 TABLET, FILM COATED ORAL at 17:54

## 2025-01-24 RX ADMIN — ENTACAPONE 200 MILLIGRAM(S): 200 TABLET, FILM COATED ORAL at 21:44

## 2025-01-24 RX ADMIN — RIVASTIGMINE 1 PATCH: 4.6 PATCH, EXTENDED RELEASE TRANSDERMAL at 06:36

## 2025-01-24 RX ADMIN — Medication 2 TABLET(S): at 21:44

## 2025-01-24 RX ADMIN — Medication 1 TABLET(S): at 17:55

## 2025-01-24 RX ADMIN — Medication 3.12 MILLIGRAM(S): at 17:54

## 2025-01-24 RX ADMIN — Medication 75 MILLIGRAM(S): at 12:19

## 2025-01-24 RX ADMIN — ENTACAPONE 200 MILLIGRAM(S): 200 TABLET, FILM COATED ORAL at 05:50

## 2025-01-24 RX ADMIN — ATORVASTATIN CALCIUM 80 MILLIGRAM(S): 80 TABLET, FILM COATED ORAL at 21:44

## 2025-01-24 RX ADMIN — LEVETIRACETAM 500 MILLIGRAM(S): 750 TABLET, FILM COATED ORAL at 05:49

## 2025-01-24 RX ADMIN — ANTISEPTIC SURGICAL SCRUB 1 APPLICATION(S): 0.04 SOLUTION TOPICAL at 05:50

## 2025-01-24 RX ADMIN — Medication 5 MILLIGRAM(S): at 05:50

## 2025-01-24 RX ADMIN — Medication 100 MILLIGRAM(S): at 21:44

## 2025-01-24 RX ADMIN — GABAPENTIN 100 MILLIGRAM(S): 800 TABLET ORAL at 21:44

## 2025-01-24 RX ADMIN — Medication 2 TABLET(S): at 14:26

## 2025-01-24 RX ADMIN — RIVASTIGMINE 1 PATCH: 4.6 PATCH, EXTENDED RELEASE TRANSDERMAL at 05:49

## 2025-01-24 RX ADMIN — ASPIRIN 81 MILLIGRAM(S): 81 TABLET, COATED ORAL at 12:19

## 2025-01-24 RX ADMIN — MIRTAZAPINE 7.5 MILLIGRAM(S): 30 TABLET, FILM COATED ORAL at 12:19

## 2025-01-24 RX ADMIN — Medication 3.12 MILLIGRAM(S): at 05:50

## 2025-01-24 RX ADMIN — Medication 2 TABLET(S): at 05:49

## 2025-01-24 RX ADMIN — ENTACAPONE 200 MILLIGRAM(S): 200 TABLET, FILM COATED ORAL at 14:26

## 2025-01-24 NOTE — H&P ADULT - TIME BILLING
chart review, comprehensive teaching history and physical  with resident, patient counseling and education regarding disease risk factors and expected outcome and the inpatient rehab process, coordination with nursing, ACP and therapists and discussion with discharging team regarding handoff of the patient's condition and hospital course. .

## 2025-01-24 NOTE — PROGRESS NOTE ADULT - SUBJECTIVE AND OBJECTIVE BOX
Electrophysiology Brief Post-Op Note    I have personally seen and examined the patient.  I agree with the history and physical which I have reviewed and noted any changes below.  01-24-25 @ 10:27    PRE-OP DIAGNOSIS: Cryptogenic CVA    POST-OP DIAGNOSIS: Cryptogenic CVA    PROCEDURE: Loop Recorder Implant    Physician: Dr. Ireland  Assistant: MIRNA Resendez    ESTIMATED BLOOD LOSS:  2    mL    ANESTHESIA TYPE:  [  ]General Anesthesia  [  ] Sedation  [X  ] Local/Regional    CONDITION  [  ] Critical  [  ] Serious  [  ]Fair  [ X ]Good    SPECIMENS REMOVED (IF APPLICABLE):  none    IMPLANTS (IF APPLICABLE)  Loop Recorder ([employer])    FINDINGS  PLAN OF CARE  - May remove bandaid in 2 days  - May shower in 2 days  Follow up in EP office in 1 month

## 2025-01-24 NOTE — DISCHARGE NOTE NURSING/CASE MANAGEMENT/SOCIAL WORK - PATIENT PORTAL LINK FT
You can access the FollowMyHealth Patient Portal offered by [facility] by registering at the following website: [email or web address]. By joining [facility]’s FollowMyHealth portal, you will also be able to view your health information using other applications (apps) compatible with our system.

## 2025-01-24 NOTE — PATIENT PROFILE ADULT - FALL HARM RISK - HARM RISK INTERVENTIONS
Assistance with ambulation/Assistance OOB with selected safe patient handling equipment/Communicate Risk of Fall with Harm to all staff/Monitor for mental status changes/Move patient closer to nurses' station/Reinforce activity limits and safety measures with patient and family/Reorient to person, place and time as needed/Tailored Fall Risk Interventions/Toileting schedule using arm’s reach rule for commode and bathroom/Use of alarms - bed, chair and/or voice tab/Visual Cue: Yellow wristband and red socks/Bed in lowest position, wheels locked, appropriate side rails in place/Call bell, personal items and telephone in reach/Instruct patient to call for assistance before getting out of bed or chair/Non-slip footwear when patient is out of bed/[place] to call system/Physically safe environment - no spills, clutter or unnecessary equipment/Purposeful Proactive Rounding/Room/bathroom lighting operational, light cord in reach

## 2025-01-24 NOTE — H&P ADULT - NSHPREVIEWOFSYSTEMS_GEN_ALL_CORE
Constiutional:    [   ] WNL           [   ] poor appetite   [   ] insomnia   [   ] tired   Cardio:                [   ] WNL           [   ] CP   [   ] SY   [   ] palpitations               Resp:                   [ x  ] WNL           [   ] SOB   [   ] cough   [   ] wheezing   GI:                        [  x ] WNL           [   ] constipation   [   ] diarrhea   [   ] abdominal pain   [   ] nausea   [   ] emesis                                :                      [   x] WNL           [   ] LAM  [   ] dusuria   [   ] difficulty voiding             Endo:                   [  x ] WNL          [   ] po;yuria   [   ] temperature intolerance                 Skin:                     [  x ] WNL          [   ] pain   [   ] wound   [   ] rash   MSK:                    [  x ] WNL          [   ] muscle pain   [   ] joint pain/ stiffness   [   ] muscle tenderness   [   ] swelling   Neuro:                 [ x  ] WNL          [   ] HA   [   ] change in vision   [   ] tremor   [   ] weakness   [   ]dysphagia              Cognitive:           [   ] WNL           [ x  ]confusion      Psych:                  [x   ] WNL           [   ] hallucinations   [   ]agitation   [   ] delusion   [   ]depression Constitutional:    [   ] WNL           [   ] poor appetite   [   ] insomnia   [   ] tired [ x] weak   Cardio:                [ x  ] WNL           [   ] CP   [   ] SY   [   ] palpitations               Resp:                   [ x  ] WNL           [   ] SOB   [   ] cough   [   ] wheezing   GI:                        [  x ] WNL           [   ] constipation   [   ] diarrhea   [   ] abdominal pain   [   ] nausea   [   ] emesis                                :                      [   x] WNL           [   ] LAM  [   ] dusuria   [   ] difficulty voiding             Endo:                   [  x ] WNL          [   ] po;yuria   [   ] temperature intolerance                 Skin:                     [  x ] WNL          [   ] pain   [   ] wound   [   ] rash   MSK:                    [  x ] WNL          [   ] muscle pain   [   ] joint pain/ stiffness   [   ] muscle tenderness   [   ] swelling   Neuro:                 [ x  ] WNL          [   ] HA   [   ] change in vision   [   ] tremor   [   ] weakness   [   ]dysphagia              Cognitive:           [   ] WNL           [ x  ]confusion at times   Psych:                  [   ] WNL           [   ] hallucinations   [   ]agitation   [   ] delusion   [ x  ]depression/ anxiety

## 2025-01-24 NOTE — PROGRESS NOTE ADULT - ATTENDING COMMENTS
Patient seen and examined and agree with above except as noted.  Patients history, notes, labs, imaging, vitals and meds reviewed personally.  Doing well with no worsening of symptoms  Tongue still deviates to the right but can overcome and move to left     Plan as above
Patient seen and examined and agree with above except as noted.  Patients history, notes, labs, imaging, vitals and meds reviewed personally.  Patient with right hemiparesis  Etiology of stroke unclear and new right tongue deviation this morning    Plan as above
Patient seen and examined and agree with above except as noted.  Patients history, notes, labs, imaging, vitals and meds reviewed personally.  No new stroke on MRI  Some atrophy on right side of tongue    Plan as above

## 2025-01-24 NOTE — H&P ADULT - ASSESSMENT
ASSESSMENT/PLAN    74M PMH of Parkinson's, HTN, hx ischemic CVA x2, type 2 DM, dementia and chronic back pain, Hx of seizures on keppra BIBA from [place] facility  or generalized weakness, confusion, decreased PO intake and multiple witnessed falls over the past few days. He had two witnessed falls the day prior to admission where he was weak and slumped to the ground. No LOC, not on AC. Per staff, he is normally alert and oriented x 3 and able to perform ADLs, but for the past few days has been increasingly confused. He was evaluated in the ED the day prior for similar complaints, had a negative work up and was discharged. Infectious and trauma workup negative. On 1/21 patient was notes to have expressive aphasia and R facial droop. Evaluated by neurology, MRI ordered and showed acute stroke in the L corona radiata. CTA Head/Neck showed severe stenosis left MCA proximal M2. Moderate narrowing right proximal vertebral arter On 1/22 new onset L tongue deviation and mild dysarthria - repeat MRI brain with no new acute findings. ILR placed 1/24. NIHSS 4 on admission.    #Rehab of gait abnormality, dysarhtira, and expressive aphasia iso acute L corona radiata stroke  -Imaging as above  -s/p ILR 1/24  -s/p  EEG 1/22: generalized slowing, no seizures  - A1C 4.9, , TSH 2.81  -c/w ASA and plavix for 90 days  -c/w lipitor 80mg qhs  -PT/OT/SLP/Neuropsych eval and treat     #HTN  -c/w amlodipine 5mg daily  -c/w coreg 3.125mg q12h  -Monitor BP     #HLD  -c/w lipitor 80mg qhs    #Hx of seizures  -c/w keppra 500 bid    #Hx of Parkinson’s  #Hx of dementia  -c/w home meds sinemet, entacapone, and rivastigmine     #Anxiety/depression  #Insomnia  -c/w mirtazapine 7.5 daily  -c/w trazadone 100mg qhs  -c/w melatonin 3mg qhs prn       -Pain control: tylenol prn    -GI/Bowel Mgmt: senna/miralax    -Bladder management:      -Skin:  No active issues at this time    -FEN     - Diet: Easy to chew with thins DASH/carb consistent           Precautions / PROPHYLAXIS:      - Falls      - DVT prophylaxis:      MEDICAL PROGNOSIS: GOOD            REHAB POTENTIAL: GOOD             ESTIMATED DISPOSITION: HOME WITH HOME CARE       [ x ]  The goals of the IRF admission were discussed with the patient and or family member, who agreed             ELOS:  [  x   ] 7-14    [    ]  14-21    [    ]  Other    THERAPY ORDERS and INITIAL INDIVIDUALIZED PLAN OF CARE:  This initial individualized interdisciplinary plan of care, which was established by me (the attending physiatrist), is based on elements from the post admission evaluation. The interdisciplinary therapy program is to be at least 3 hrs a day, at least 5 days per week from from physical, occupational and/ or speech therapies as ordered by me below.      [ x  ] P.T. 90 mins. /day at least 5 out of 7 days:  [  x ] superficial  modalities prn, [ x  ] A/AAROM, [ x  ] PREs, [ x  ] transfer training,            [ x  ] progressive ambulation, [x   ] stairs                                               [ x  ] O.T. 90 mins. /day at least 5 out of 7 days::  [ x  ] modalities prn,  [ x  ]A/AAROM, [ x  ] PREs, [  x ] functional transfer training, [ x  ] ADL training           [   ] cognitive/ perceptual eval and training, [   ] splint eval                                                  [  x ] S.L.P:  [ x  ] speech eval, [ x  ] swallow eval     [   x] Neuropsychology eval     [ x  ] Individualized rec. therapy      RATIONALE FOR INPATIENT ADMISSION - Patient demonstrates the following: (check all that apply)  [X] Medically appropriate for acute rehabilitation admission. Requires interdisiplinary therapy consisting of at least PT and OT, at least 3 hrs. a day at least 5 days a week  [X] Has attainable rehab goals with an appropriate initial discharge plan  [X] Has rehabilitation potential (expected to make a significant improvement within a reasonable period of time)  [X] Requires close medical management by a rehab physician, rehab nursing care,  and comprehensive interdisciplinary team (including PT, OT)    [X] Requires evaluation by a physiatrist at least 3 days a week to evaluate and manage and coordinate rehab and medical problems     ASSESSMENT/PLAN    74M PMH of Parkinson's, HTN, hx ischemic CVA x2, type 2 DM, dementia and chronic back pain, Hx of seizures on keppra BIBA from [place] facility  or generalized weakness, confusion, decreased PO intake and multiple witnessed falls over the past few days. He had two witnessed falls the day prior to admission where he was weak and slumped to the ground. No LOC, not on AC. Per staff, he is normally alert and oriented x 3 and able to perform ADLs, but for the past few days has been increasingly confused. He was evaluated in the ED the day prior for similar complaints, had a negative work up and was discharged. Infectious and trauma workup negative. On 1/21 patient was notes to have expressive aphasia and R facial droop. Evaluated by neurology, MRI ordered and showed acute stroke in the L corona radiata. CTA Head/Neck showed severe stenosis left MCA proximal M2. Moderate narrowing right proximal vertebral arter On 1/22 new onset L tongue deviation and mild dysarthria - repeat MRI brain with no new acute findings. ILR placed 1/24. NIHSS 4 on admission.    #Rehab of gait abnormality, dysarhtira, and expressive aphasia iso acute L corona radiata stroke  -Imaging as above  -s/p ILR 1/24  -s/p  EEG 1/22: generalized slowing, no seizures  - A1C 4.9, , TSH 2.81  -c/w ASA and plavix for 90 days  -c/w lipitor 80mg qhs  -PT/OT/SLP/Neuropsych eval and treat     #HTN  -c/w amlodipine 5mg daily  -c/w coreg 3.125mg q12h  -Monitor BP     #HLD  -c/w lipitor 80mg qhs    #DM2  - A1C with Estimated Average Glucose Result: 4.9: Method: Immunoassay - well controlled    #Hx of seizures  -c/w keppra 500 bid    #Hx of Parkinson’s  #Hx of dementia. Mild and does not interfere with patient's ability to participate in and benefit from acute rehab.  -c/w home meds sinemet, entacapone, and rivastigmine     #Anxiety/depression  #Insomnia  -c/w mirtazapine 7.5 daily  -c/w trazadone 100mg qhs  -c/w melatonin 3mg qhs prn       -Pain control: tylenol prn    -GI/Bowel Mgmt: senna/miralax    -Bladder management:      -Skin:  No active issues at this time    -FEN     - Diet: Easy to chew with thins DASH/carb consistent           Precautions / PROPHYLAXIS:      - Falls      - DVT prophylaxis: Lovinox      MEDICAL PROGNOSIS: GOOD            REHAB POTENTIAL: GOOD for ambulation with device and supervision            ESTIMATED DISPOSITION: HOME WITH HOME CARE       [ x ]  The goals of the IRF admission were discussed with the patient, who agreed.  discussed plan with his advocate, who agrees.       ELOS:  [  x   ] 7-14    [    ]  14-21    [    ]  Other    THERAPY ORDERS and INITIAL INDIVIDUALIZED PLAN OF CARE:  This initial individualized interdisciplinary plan of care, which was established by me (the attending physiatrist), is based on elements from the post admission evaluation. The interdisciplinary therapy program is to be at least 3 hrs a day, at least 5 days per week from from physical, occupational and/ or speech therapies as ordered by me below.      [ x  ] P.T. 90 mins. /day at least 5 out of 7 days:  [  x ] superficial  modalities prn, [ x  ] A/AAROM, [ x  ] PREs, [ x  ] transfer training,            [ x  ] progressive ambulation, [x   ] stairs                                               [ x  ] O.T. 90 mins. /day at least 5 out of 7 days::  [ x  ] modalities prn,  [ x  ]A/AAROM, [ x  ] PREs, [  x ] functional transfer training, [ x  ] ADL training           [ x  ] cognitive/ perceptual eval and training, [   ] splint eval                                                  [  x ] S.L.P:  [ x  ] speech eval, [ x  ] swallow eval     [   x] Neuropsychology eval     [ x  ] Individualized rec. therapy      RATIONALE FOR INPATIENT ADMISSION - Patient demonstrates the following: (check all that apply)  [X] Medically appropriate for acute rehabilitation admission. Requires interdisiplinary therapy consisting of at least PT and OT, at least 3 hrs. a day at least 5 days a week  [X] Has attainable rehab goals with an appropriate initial discharge plan  [X] Has rehabilitation potential (expected to make a significant improvement within a reasonable period of time)  [X] Requires close medical management by a rehab physician, rehab nursing care,  and comprehensive interdisciplinary team (including PT, OT)    [X] Requires evaluation by a physiatrist at least 3 days a week to evaluate and manage and coordinate rehab and medical problems

## 2025-01-24 NOTE — H&P ADULT - NSHPLABSRESULTS_GEN_ALL_CORE
15.9   8.36  )-----------( 145      ( 24 Jan 2025 06:04 )             46.1     01-24    142  |  106  |  15  ----------------------------<  77  4.0   |  27  |  1.0    Ca    8.6      24 Jan 2025 06:04  Phos  2.7     01-24  Mg     1.8     01-24    TPro  5.7[L]  /  Alb  3.8  /  TBili  0.9  /  DBili  x   /  AST  12  /  ALT  <5  /  AlkPhos  79  01-23      Urinalysis Basic - ( 24 Jan 2025 06:04 )    Color: x / Appearance: x / SG: x / pH: x  Gluc: 77 mg/dL / Ketone: x  / Bili: x / Urobili: x   Blood: x / Protein: x / Nitrite: x   Leuk Esterase: x / RBC: x / WBC x   Sq Epi: x / Non Sq Epi: x / Bacteria: x      POCT Blood Glucose.: 98 mg/dL (01-24-25 @ 11:56)  POCT Blood Glucose.: 90 mg/dL (01-24-25 @ 07:49)  POCT Blood Glucose.: 107 mg/dL (01-23-25 @ 20:53)  POCT Blood Glucose.: 92 mg/dL (01-23-25 @ 16:48)  POCT Blood Glucose.: 99 mg/dL (01-23-25 @ 12:48)      < from: MR Head No Cont (01.21.25 @ 17:20) >    MPRESSION:    1.  Acute white matter infarct left corona radiata    2.  Moderate microvascular ischemic changes.      < end of copied text >    < from: CT Angio Head w/ IV Cont (01.22.25 @ 20:54) >    IMPRESSION:    1.  Severe stenosis left MCA proximal M2    2.  Patent carotid bifurcations    3.  Moderate narrowing right proximal vertebral artery.    < end of copied text >

## 2025-01-24 NOTE — H&P ADULT - NSHPPHYSICALEXAM_GEN_ALL_CORE
PHYSICAL EXAMINATION   VItals: T(C): 36.5 (01-24-25 @ 05:15), Max: 36.7 (01-23-25 @ 13:58)  HR: 72 (01-24-25 @ 05:15) (67 - 72)  BP: 145/94 (01-24-25 @ 05:15) (133/77 - 145/94)  RR: 18 (01-24-25 @ 05:15) (18 - 18)  SpO2: 100% (01-24-25 @ 05:15) (97% - 100%)    General:[x   ] NAD, Resting Comfortable,   [   ] other:                                HEENT: [ x  ] NC/AT, EOMI, PERRL , Normal Conjunctivae,   [   ] other:  Cardio: [  x ] RRR, no murmer,   [   ] other:                              Pulm: [  x ] No Respiratory Distress,  Lungs CTAB,   [   ] other:                       Abdomen: [x   ]ND/NT, Soft,   [   ] other:    : [ x  ] NO LAM CATHETER, [   ] LAM CATHETER- no meatal tear, no discharge, [   ] other:                                            MSK: [x   ] No joint swelling, Full ROM,   [   ] other:                                         Ext: [  x ]No C/C/E, No calf tenderness,   [   ]other:    Skin: [ x  ]intact,   [   ] other:                                                                   Neurological Examination:  Cognitive: [    ] AAO x 3,   [ x   ]  other: AOx2 - not space,                                                                      Attention:  [    ] intact,   [    ]  other:                            Memory: [    ] intact,    [    ]  other:     Mood/Affect: [    ] wnl,    [    ]  other:                                                                             Communication: [    ]Fluent, no dysarthria, following commands:  [    ] other:   CN II - XII:  [    ] intact,  [    ] other:                                                                                        Motor:   RIGHT UE: [   ] WNL,  [   ] other:  LEFT    UE: [   ] WNL,  [   ] other:  RIGHT LE: [   ] WNL,  [   ] other:   LEFT    LE: [   ] WNL,  [   ] other:    Tone: [    ] wnl,   [    ]  other:  DTRs: [   ]symmetric, [   ] other:  Coordination:   [    ] intact,   [    ] other:                                                                           Sensory: [    ] Intact to light touch,   [    ] other: PHYSICAL EXAMINATION   VItals: T(C): 36.5 (01-24-25 @ 05:15), Max: 36.7 (01-23-25 @ 13:58)  HR: 72 (01-24-25 @ 05:15) (67 - 72)  BP: 145/94 (01-24-25 @ 05:15) (133/77 - 145/94)  RR: 18 (01-24-25 @ 05:15) (18 - 18)  SpO2: 100% (01-24-25 @ 05:15) (97% - 100%)    General:[x   ] NAD, Resting Comfortable,   [   ] other:                                HEENT: [ x  ] NC/AT, EOMI, PERRL , Normal Conjunctivae,   [   ] other:  Cardio: [  x ] RRR, no murmer,   [   ] other:                              Pulm: [  x ] No Respiratory Distress,  Lungs CTAB,   [   ] other:                       Abdomen: [x   ]ND/NT, Soft,   [   ] other:    : [ x  ] NO LAM CATHETER, [   ] LAM CATHETER- no meatal tear, no discharge, [   ] other:                                            MSK: [x   ] No joint swelling, Full ROM,   [   ] other:                                         Ext: [  x ]No C/C/E, No calf tenderness,   [   ]other:    Skin: [ x  ]intact,   [   ] other:                                                                   Neurological Examination:  Cognitive: [    ] AAO x 3,   [ x   ]  other: AOx2 - not space,                                                                      Attention:  [    ] intact,   [ x   ]  other: cannot count change,                             Memory: [    ] intact,    [ x   ]  other:     Mood/Affect: [    ] wnl,    [    ]  other:                                                                             Communication: [    ]Fluent, no dysarthria, following commands:  [    ] other:   CN II - XII:  [    ] intact,  [    ] other:                                                                                        Motor:   RIGHT UE: [   ] WNL,  [   ] other:  LEFT    UE: [   ] WNL,  [   ] other:  RIGHT LE: [   ] WNL,  [   ] other:   LEFT    LE: [   ] WNL,  [   ] other:    Tone: [    ] wnl,   [    ]  other:  DTRs: [   ]symmetric, [   ] other:  Coordination:   [    ] intact,   [    ] other:                                                                           Sensory: [    ] Intact to light touch,   [    ] other: PHYSICAL EXAMINATION   VItals: T(C): 36.5 (01-24-25 @ 05:15), Max: 36.7 (01-23-25 @ 13:58)  HR: 72 (01-24-25 @ 05:15) (67 - 72)  BP: 145/94 (01-24-25 @ 05:15) (133/77 - 145/94)  RR: 18 (01-24-25 @ 05:15) (18 - 18)  SpO2: 100% (01-24-25 @ 05:15) (97% - 100%)    General:[x   ] NAD, Resting Comfortable,   [   ] other:                                HEENT: [ x  ] NC/AT, EOMI, PERRL , Normal Conjunctivae,   [   ] other:  Cardio: [  x ] RRR, no murmer,   [   ] other:                              Pulm: [  x ] No Respiratory Distress,  Lungs CTAB,   [   ] other:                       Abdomen: [x   ]ND/NT, Soft,   [   ] other:    : [ x  ] NO LAM CATHETER, [   ] LAM CATHETER- no meatal tear, no discharge, [   ] other:                                            MSK: [x   ] No joint swelling, Full ROM,   [   ] other:                                         Ext: [  x ]No C/C/E, No calf tenderness,   [   ]other:    Skin: [ x  ]intact,   [   ] other:                                                                   Neurological Examination:  Cognitive: [    ] AAO x 3,   [ x   ]  other: AOx2 - not space,                                                                      Attention:  [  x  ] intact,   [  ]  other:                         Memory: [    ] intact,    [ x   ]  other:   2/3 delayed recall with verbal cues  Mood/Affect: [  x  ] wnl,    [    ]  other:                                                                             Communication: [    ]Fluent, no dysarthria, following commands:  [   x ] other: mild dysarthria, expressive aphasia, mild anomia, hypophonia   CN II - XII:  [    ] intact,  [ x   ] other: R facial droop                                                                                        Motor:   RIGHT UE: [  x ] WNL,  [   ] other:  LEFT    UE: [  x ] WNL,  [   ] other:  RIGHT LE: [ x  ] WNL,  [   ] other:   LEFT    LE: [x   ] WNL,  [   ] other:    Tone: [  x ] wnl,   [    ]  other:  DTRs: [   ]symmetric, [ x  ] other: R patellar reflex brisk 3+  Coordination:   [x    ] intact,   [    ] other:                                                                           Sensory: [   x ] Intact to light touch,   [    ] other:    NIHSS 4 on admission

## 2025-01-24 NOTE — PROGRESS NOTE ADULT - SUBJECTIVE AND OBJECTIVE BOX
Neurology Progress Note    Interval History: patient reported no issues, does not recall that overnight he was emotional and pulling at lines    Medications:  acetaminophen     Tablet .. 650 milliGRAM(s) Oral every 6 hours PRN  amLODIPine   Tablet 5 milliGRAM(s) Oral daily  aspirin  chewable 81 milliGRAM(s) Oral daily  atorvastatin 80 milliGRAM(s) Oral at bedtime  carbidopa/levodopa  25/100 2 Tablet(s) Oral three times a day  carbidopa/levodopa CR 50/200 1 Tablet(s) Oral <User Schedule>  carvedilol 3.125 milliGRAM(s) Oral every 12 hours  chlorhexidine 2% Cloths 1 Application(s) Topical <User Schedule>  clopidogrel Tablet 75 milliGRAM(s) Oral daily  dextrose 5%. 1000 milliLiter(s) IV Continuous <Continuous>  dextrose 5%. 1000 milliLiter(s) IV Continuous <Continuous>  dextrose 50% Injectable 25 Gram(s) IV Push once  dextrose 50% Injectable 12.5 Gram(s) IV Push once  dextrose 50% Injectable 25 Gram(s) IV Push once  dextrose Oral Gel 15 Gram(s) Oral once PRN  enoxaparin Injectable 40 milliGRAM(s) SubCutaneous every 24 hours  entacapone 200 milliGRAM(s) Oral three times a day  gabapentin 100 milliGRAM(s) Oral at bedtime  glucagon  Injectable 1 milliGRAM(s) IntraMuscular once  insulin lispro (ADMELOG) corrective regimen sliding scale   SubCutaneous three times a day before meals  levETIRAcetam 500 milliGRAM(s) Oral two times a day  melatonin 3 milliGRAM(s) Oral at bedtime PRN  mirtazapine 7.5 milliGRAM(s) Oral daily  polyethylene glycol 3350 17 Gram(s) Oral daily  rivastigmine patch  9.5 mG/24 Hr(s) 1 Patch Transdermal every 24 hours  senna 2 Tablet(s) Oral at bedtime  traZODone 100 milliGRAM(s) Oral at bedtime    Vital Signs Last 24 Hrs  T(C): 36.5 (24 Jan 2025 05:15), Max: 36.7 (23 Jan 2025 13:58)  T(F): 97.7 (24 Jan 2025 05:15), Max: 98.1 (23 Jan 2025 13:58)  HR: 72 (24 Jan 2025 05:15) (67 - 72)  BP: 145/94 (24 Jan 2025 05:15) (133/77 - 145/94)  BP(mean): 103 (24 Jan 2025 05:15) (96 - 103)  RR: 18 (24 Jan 2025 05:15) (18 - 18)  SpO2: 100% (24 Jan 2025 05:15) (97% - 100%)    Parameters below as of 24 Jan 2025 05:15  Patient On (Oxygen Delivery Method): room air      Physical Exam  GENERAL: well-developed, well-nourished, in no acute distress    NEUROLOGIC:  Mental status: AOx3  Language: Speech with intact fluency , repetition , comprehension, / present anomic aphasia improved from yesterday (able to name most objects), present dysarthria  Cranial nerves:   II: Visual fields are full to confrontation  III, IV, VI: Extraocular movements intact, no noticeable nystagmus, pupils equally round and reactive to light  V:  Facial sensation V1-V3 equal and intact  VII: R minor facial droop  VIII: Hearing is intact bilaterally intact  IX, X: Uvula is midline and soft palate rises symmetrically  XI: Head turning and shoulder shrug are intact ad symmetric  XII: Tongue deviated to R side, able to center his tongue and move it to the L side  Motor:  - shoulder abduction     R 5/5     L 5/5  - elbow flexion     R 5/5     L 5/5  - elbow extension     R 5/5     L 5/5  - wrist flexion     R 5/5     L 5/5  - wrist extension     R 5/5     L 5/5  - finger flexion     R 5/5     L 5/5  - finger extension     R 5/5     L 5/5  - thumb opposition     R 5/5     L 5/5  - hip flexion     R 5/5     L 5/5  - knee flexion     R 5/5     L 5/5  - knee extension     R 5/5     L 5/5  - dorsiflexion     R 5/5     L 5/5  - plantarflexion     R 5/5     L 5/5  Sensation: Intact to light touch bilaterally. No neglect or extinction on double simultaneous testing.  Coordination: No dysmetria on finger-to-nose and heel-to-shin bilaterally, rapid alternating movements intact and symmetric  Reflexes:   - biceps     R 2+     L 2+  - triceps     R 2+     L 2+  - brachioradialis     R 2+     L 2+  - patellar     R 3+     L 3+  - Achilles     R 2+     L 2+  - Babinski     R downgoing    L downgoing  Movement: resting tremor low amplitude low frequency R hand with postural component, rigidity R >L, bradykinesia on hand opening/closing and foot tapping bilateral   Gait: Deferred at this encounter    NIHSS: 3 (R minor facial droop 1, mild anomic aphasia 1, mild dysarthria 1)    Labs:  CBC Full  -  ( 24 Jan 2025 06:04 )  WBC Count : 8.36 K/uL  RBC Count : 4.74 M/uL  Hemoglobin : 15.9 g/dL  Hematocrit : 46.1 %  Platelet Count - Automated : 145 K/uL  Mean Cell Volume : 97.3 fL  Mean Cell Hemoglobin : 33.5 pg  Mean Cell Hemoglobin Concentration : 34.5 g/dL  Auto Neutrophil # : x  Auto Lymphocyte # : x  Auto Monocyte # : x  Auto Eosinophil # : x  Auto Basophil # : x  Auto Neutrophil % : x  Auto Lymphocyte % : x  Auto Monocyte % : x  Auto Eosinophil % : x  Auto Basophil % : x    01-24    142  |  106  |  15  ----------------------------<  77  4.0   |  27  |  1.0    Ca    8.6      24 Jan 2025 06:04  Phos  2.7     01-24  Mg     1.8     01-24    TPro  5.7[L]  /  Alb  3.8  /  TBili  0.9  /  DBili  x   /  AST  12  /  ALT  <5  /  AlkPhos  79  01-23    LIVER FUNCTIONS - ( 23 Jan 2025 05:56 )  Alb: 3.8 g/dL / Pro: 5.7 g/dL / ALK PHOS: 79 U/L / ALT: <5 U/L / AST: 12 U/L / GGT: x             Urinalysis Basic - ( 24 Jan 2025 06:04 )    Color: x / Appearance: x / SG: x / pH: x  Gluc: 77 mg/dL / Ketone: x  / Bili: x / Urobili: x   Blood: x / Protein: x / Nitrite: x   Leuk Esterase: x / RBC: x / WBC x   Sq Epi: x / Non Sq Epi: x / Bacteria: x        NEUROLOGIC STUDIES/LABWORK REVIEWED    [1/15] HCT: There are chronic lacunar infarcts involving the left basal ganglia as   well as the bilateral thalami. There is confluent periventricular as well   as subcortical white matter hypodensity. There is no intraparenchymal   hematoma, mass effect or midline shift. No abnormal extra-axial fluid   collections are present.  [1/22] CTA head/Neck:   1.  Severe stenosis left MCA proximal M2   2.  Patent carotid bifurcations  3.  Moderate narrowing right proximal vertebral artery.  [1/21] MRI brain w/out:   1.  Acute white matter infarct left corona radiata   2.  Moderate microvascular ischemic changes.   Multiple foci of susceptibility artifact demonstrated predominately within the basal ganglia and cerebellum noted. This can correlate with hypertension.   [1/21] TTE EF 69%   [1/22] repeat CTH Compared with the CT scan of 1/15/2025, there is a new lucency in the left corona radiata, adjacent to the left lateral ventricle, consistent   with an acute infarction, as noted on the earlier reported MRI of   1/21/2025 (2/20).  Chronic lacunar infarcts in the right and left thalamus and left basal ganglia are again noted,  Carotid siphon calcifications are noted.  There are patchy hypodensities throughout the hemispheric white matter without mass effect compatible with chronic microvascular changes.  [1/23] repeat MRI  1.  Stable exam compared to 1/21/2025:  2.  Stable acute infarct within the left corona radiata/basal ganglia. No new infarct demonstrated.  3.  Stable moderate chronic microvascular changes and multiple chronic lacunar infarcts.    CORE MEASURES   [4.9] HbA1c  [101] LDL  [2.81] TSH    OTHER STUDIES/LABWORK REVIEWED

## 2025-01-24 NOTE — PROGRESS NOTE ADULT - ASSESSMENT
74 year old Male, PMH of Parkinson's, HTN, hx ischemic CVA x2, type 2 DM, dementia and chronic back pain, Hx of seizures on keppra, BIBA from [facility]  for generalized weakness, confusion, decreased PO intake and multiple witnessed falls over the past few days.    #Acute ischemic stroke  #Recurrent falls associated w/ confusion weakness  #Expressive aphasia  #Hx parkinson's disease   #Hx dementia  #Hx ischemic CVA x2  #Hx seizure disorder   #Lt tongue deviation  - Trauma w/u negative: CTH, CXR, Xray pelvis   - Infectious w/u negative: afebrile, no leukocytosis, UA(-), trop 11> 9  - orthostatics: negative   - TSH, vit D wnl  - c/w home meds: carbidopa/levodopa, entacapone, rivastigmine patch (submitted nonformulary- f/u if accepted)   -c/w keppra 500mg bid  - c/w gabapentin 100mg qhs   - c/w mirtazapine 7.5mg qd   - c/w trazodone 100mg qhs  - EEG: Findings consistent with mild diffuse electrocerebral dysfunction secondary to nonspecific etiology  - Echo: EF 69%, wnl  - remote telemetry due to stroke  Neuro following   - MRI showing acute stroke of L corona radiata  - CTA Head/Neck:  Severe stenosis left MCA proximal M2. Moderate narrowing right proximal vertebral artery.  - repeat MRI pending per Neuro   - started high dose statin  - c/w aspirin, added plavix  - , TSH 2.81, A1C wnl  - EP for loop recorder- placed today   - Speech language pathology evaluated - mod to severe dysarthria, consistent with PD  - PT/OT - rehab on dc   - q4 stroke neuro checks and vitals  - SBP goal 120-160    #HTN  #h/o DM2  - decreased coreg to 3.25mg BID   - c/w amlodipine 5mg qd for now  - A1C 4.9  - Monitor     Management per Neuro, Medicine for comanagement     Total time spent to complete patient's bedside assessment, review medical chart, discuss medical plan of care with covering medical team was more than 35 minutes  with >50% of time spent face to face with patient, discussion with patient/family and/or coordination of care. My note supersedes them medical resident note in case of discrepancy.    Snehal Taylor, DO

## 2025-01-24 NOTE — PROGRESS NOTE ADULT - ASSESSMENT
73 y/o M with a PMHx of Parkinson's disease, HTN, ischemic stroke (1998 and 2016 with residual expressive aphasia and cognitive deficits), T2DM, gout, dementia and chronic back pain, seizures on Keppra, admitted to medicine for AMS, Neurology was consulted for AMS despite lack of metabolic derangements. CTH on 1/15 negative. Evaluated by general neurology on 1/21 and noted to have R facial droop, brisk R patellar reflex, positive R babinski reflex, a trembling in his lower left lip, and a left-handed resting tremor with a postural component.  MRI brain 1/21 showed acute L corona radiata infarct. On follow-up exam on 1/22 was noted to have new onset L tongue deviation and mild dysarthria. Transferred to stroke service for stroke management and repeat MRH. Considering neuroimaging suspect L corona radiata stroke secondary to ICAD (R M2 stenosis). Repeat MRH pursued to rule out new stroke considering new onset R tongue deviation however did not show acute findings suggesting that the tongue deviation may be a chronic finding that was not previously known.     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  #anxiety/depression  #insomnia  - c/w home meds: carbidopa/levodopa, entacapone, rivastigmine patch (submitted nonformulary- f/u if accepted)  - c/w mirtazapine 7.5mg qd   - c/w trazodone 100mg qhs  - melatonin 3mg nightly prn     #history of seizures  - keppra 500mg twice daily     #back pain  - c/w gabapentin 100mg qhs     #constipation  - miralax 17g daily  - senna 2 tablets nightly  --------------------------------------------------  #prophylactic measures  - DVT prophylaxis: lovenox / SCDs  - GI prophylaxis: not indicated at this time  - Diet: DASH/TLC / consistent carbohydrate  --------------------------------------------------  #disposition -   - PT / OT / Physiatry advises discharge to: rehab  - patient planning to discharge to: 4a

## 2025-01-24 NOTE — PROGRESS NOTE ADULT - PROVIDER SPECIALTY LIST ADULT
Hospitalist
Hospitalist
Internal Medicine
Internal Medicine
Neurology
Electrophysiology
Hospitalist
Hospitalist
Internal Medicine
Internal Medicine
Hospitalist
Internal Medicine
Neurology
Neurology
Physiatry
Palliative Care

## 2025-01-24 NOTE — DISCHARGE NOTE NURSING/CASE MANAGEMENT/SOCIAL WORK - FINANCIAL ASSISTANCE
[facility] provides services at a reduced cost to those who are determined to be eligible through [facility]’s financial assistance program. Information regarding [facility]’s financial assistance program can be found by going to [email or web address] or by calling [phone].

## 2025-01-24 NOTE — PROGRESS NOTE ADULT - REASON FOR ADMISSION
Recurrent witnessed falls, AMS

## 2025-01-24 NOTE — PROGRESS NOTE ADULT - SUBJECTIVE AND OBJECTIVE BOX
ZAKIA MILLS  74y, Male  Allergy: sulfa drugs (Unknown)  cefaclor (Unknown)  penicillin (Unknown)    Hospital Day: 8d    Patient seen and examined earlier today. Resting comfortably, no complaints.     PMH/PSH:  PAST MEDICAL & SURGICAL HISTORY:  Parkinson disease      CVA (cerebral vascular accident)      HTN (hypertension)      Diabetes mellitus      Gout      History of appendectomy          LAST 24-Hr EVENTS:    VITALS:  T(F): 97.7 (01-24-25 @ 05:15), Max: 98.1 (01-23-25 @ 13:58)  HR: 72 (01-24-25 @ 05:15)  BP: 145/94 (01-24-25 @ 05:15) (133/77 - 145/94)  RR: 18 (01-24-25 @ 05:15)  SpO2: 100% (01-24-25 @ 05:15)        TESTS & MEASUREMENTS:  Weight (Kg):       01-23-25 @ 07:01  -  01-24-25 @ 07:00  --------------------------------------------------------  IN: 0 mL / OUT: 200 mL / NET: -200 mL                            15.9   8.36  )-----------( 145      ( 24 Jan 2025 06:04 )             46.1       01-24    142  |  106  |  15  ----------------------------<  77  4.0   |  27  |  1.0    Ca    8.6      24 Jan 2025 06:04  Phos  2.7     01-24  Mg     1.8     01-24    TPro  5.7[L]  /  Alb  3.8  /  TBili  0.9  /  DBili  x   /  AST  12  /  ALT  <5  /  AlkPhos  79  01-23    LIVER FUNCTIONS - ( 23 Jan 2025 05:56 )  Alb: 3.8 g/dL / Pro: 5.7 g/dL / ALK PHOS: 79 U/L / ALT: <5 U/L / AST: 12 U/L / GGT: x                 Urinalysis Basic - ( 24 Jan 2025 06:04 )    Color: x / Appearance: x / SG: x / pH: x  Gluc: 77 mg/dL / Ketone: x  / Bili: x / Urobili: x   Blood: x / Protein: x / Nitrite: x   Leuk Esterase: x / RBC: x / WBC x   Sq Epi: x / Non Sq Epi: x / Bacteria: x                  RADIOLOGY, ECG, & ADDITIONAL TESTS:      RECENT DIAGNOSTIC ORDERS:      MEDICATIONS:  MEDICATIONS  (STANDING):  amLODIPine   Tablet 5 milliGRAM(s) Oral daily  aspirin  chewable 81 milliGRAM(s) Oral daily  atorvastatin 80 milliGRAM(s) Oral at bedtime  carbidopa/levodopa  25/100 2 Tablet(s) Oral three times a day  carbidopa/levodopa CR 50/200 1 Tablet(s) Oral <User Schedule>  carvedilol 3.125 milliGRAM(s) Oral every 12 hours  chlorhexidine 2% Cloths 1 Application(s) Topical <User Schedule>  clopidogrel Tablet 75 milliGRAM(s) Oral daily  dextrose 5%. 1000 milliLiter(s) (50 mL/Hr) IV Continuous <Continuous>  dextrose 5%. 1000 milliLiter(s) (100 mL/Hr) IV Continuous <Continuous>  dextrose 50% Injectable 25 Gram(s) IV Push once  dextrose 50% Injectable 12.5 Gram(s) IV Push once  dextrose 50% Injectable 25 Gram(s) IV Push once  enoxaparin Injectable 40 milliGRAM(s) SubCutaneous every 24 hours  entacapone 200 milliGRAM(s) Oral three times a day  gabapentin 100 milliGRAM(s) Oral at bedtime  glucagon  Injectable 1 milliGRAM(s) IntraMuscular once  insulin lispro (ADMELOG) corrective regimen sliding scale   SubCutaneous three times a day before meals  levETIRAcetam 500 milliGRAM(s) Oral two times a day  mirtazapine 7.5 milliGRAM(s) Oral daily  polyethylene glycol 3350 17 Gram(s) Oral daily  rivastigmine patch  9.5 mG/24 Hr(s) 1 Patch Transdermal every 24 hours  senna 2 Tablet(s) Oral at bedtime  traZODone 100 milliGRAM(s) Oral at bedtime    MEDICATIONS  (PRN):  acetaminophen     Tablet .. 650 milliGRAM(s) Oral every 6 hours PRN Temp greater or equal to 38C (100.4F), Mild Pain (1 - 3)  dextrose Oral Gel 15 Gram(s) Oral once PRN Blood Glucose LESS THAN 70 milliGRAM(s)/deciliter  melatonin 3 milliGRAM(s) Oral at bedtime PRN Insomnia      HOME MEDICATIONS:  acetaminophen 325 mg oral tablet (01-17)  amLODIPine 5 mg oral tablet (01-24)  atorvastatin 80 mg oral tablet (01-23)  carbidopa-levodopa 50 mg-200 mg oral tablet, extended release (09-24)  clopidogrel 75 mg oral tablet (01-24)  Melatonin 3 mg oral tablet (09-24)  mirtazapine 7.5 mg oral tablet (01-17)  polyethylene glycol 3350 oral powder for reconstitution (09-24)  traZODone 100 mg oral tablet (09-24)      PHYSICAL EXAM:  GENERAL: A&O x3,  in NAD  HNENT: EOMI, PERRLA    NECK: No LAD/swelling  CHEST/LUNG:  CTAB no wheezes/rales/ronchi  HEART: RRR, No murmurs  ABDOMEN: Soft, NT, ND,  Bowel sounds present  EXTREMITIES:  Warm well perfused, no edema

## 2025-01-24 NOTE — DISCHARGE NOTE NURSING/CASE MANAGEMENT/SOCIAL WORK - NSDCPEFALRISK_GEN_ALL_CORE
For information on Fall & Injury Prevention, visit: [email or web address] OR  [email or web address] OR  https://www.cdc.gov/steadi/patient.html

## 2025-01-24 NOTE — H&P ADULT - HISTORY OF PRESENT ILLNESS
74M PMH of Parkinson's, HTN, hx ischemic CVA x2, type 2 DM, dementia and chronic back pain, Hx of seizures on keppra BIBA from [place] facility  or generalized weakness, confusion, decreased PO intake and multiple witnessed falls over the past few days. He had two witnessed falls the day prior to admission where he was weak and slumped to the ground. No LOC, not on AC. Per staff, he is normally alert and oriented x 3 and able to perform ADLs, but for the past few days has been increasingly confused. He was evaluated in the ED the day prior for similar complaints, had a negative work up and was discharged. Infectious and trauma workup negative. On 1/21 patient was notes to have expressive aphasia and R facial droop. Evaluated by neurology, MRI ordered and showed acute stroke in the L corona radiata. CTA Head/Neck showed severe stenosis left MCA proximal M2. Moderate narrowing right proximal vertebral arter On 1/22 new onset L tongue deviation and mild dysarthria - repeat MRI brain with no new acute findings. ILR placed 1/24.    Prior to admission, the patient was independent in ADLs and ambulation without an assistive device Patient now requires Reza for bed mobility, modA for transfers, ambulating 40’x2 with RW and modA,  modA for LBD. Therefore, there is a significant functional decline from baseline. Patients also with medical comorbidities of DM, HTN, requiring medication management and monitoring of vitals by Physiatry team and nursing. To return home it is in the patient’s best interest to have acute inpatient rehabilitative services to undergo intensive interdisciplinary therapy. Furthermore, the patient is motivated and is able to tolerate up to 3 hours of daily therapy for 5-6 days a week for a total of 15 hours a week. Evaluated by Physiatry and deemed to be an excellent candidate for admission to  for acute inpatient rehab. Admitted to Acute Rehab on 1/24/25    LS: Lives alone in a private home, 6STE  PLOF: independent in ADLs and ambulation with no AS

## 2025-01-24 NOTE — H&P ADULT - ATTENDING COMMENTS
I reviewed the chart and examined the patient with the resident and we discussed the findings and treatment plan.  The patient is tolerating the inpatient  rehab program well. I agree with the findings and treatment plan above, which I modified as indicated. The patient requires 3 hrs a day of acute inpatient rehab. Patient with PMH significant for multiple strokes and Parkinson's Syndrome, seizure disorder, HTN, DM2, admitted for falls and increased slurred speech and found to have and acute left corona radiata stroke. He is now stable but requires mod assist to transfer and ambulate with a RW and for ADLs. PTA, he was independent in ambulation with a RW and ADLS, living in an assisted living facility. The patient requires acute interdisciplinary rehab including PT and OT to maximize function for safe d/c home in a reasonable time. The patient can tolerate and benefit from 3 hrs daily therapy and requires Physiatry f/u at least 3 days a week to manage his acute on chronic neurologic impairments from acute on chronic strokes and PS and to monitor and manage his comorbidities including seizure disorder and HTN and DM2.    I read, edited and agree with the physical exam above and assessment:  #Rehab of gait abnormality, dysarhtira, and expressive aphasia iso acute L corona radiata stroke  -Imaging as above  -s/p ILR 1/24  -s/p  EEG 1/22: generalized slowing, no seizures  - A1C 4.9, , TSH 2.81  -c/w ASA and plavix for 90 days  -c/w lipitor 80mg qhs  -PT/OT/SLP/Neuropsych eval and treat     #HTN  -c/w amlodipine 5mg daily  -c/w coreg 3.125mg q12h  -Monitor BP    #DM2  - A1C with Estimated Average Glucose Result: 4.9: Method: Immunoassay - well controlled     #HLD  -c/w lipitor 80mg qhs    #Hx of seizures  -c/w keppra 500 bid      #Hx of Parkinson’s  #Hx of dementia. Mild and does not interfere with patient's ability to participate in and benefit from acute rehab.  -c/w home meds sinemet, entacapone, and rivastigmine     #Anxiety/depression  #Insomnia  -c/w mirtazapine 7.5 daily  -c/w trazadone 100mg qhs  -c/w melatonin 3mg qhs prn       -Pain control: tylenol prn    -GI/Bowel Mgmt: senna/miralax    -Bladder management:      -Skin:  No active issues at this time    -FEN     - Diet: Easy to chew with thins DASH/carb consistent

## 2025-01-24 NOTE — CHART NOTE - NSCHARTNOTEFT_GEN_A_CORE
PALLIATIVE MEDICINE INTERDISCIPLINARY TEAM NOTE    Provider:                                             Met with: [ x  ] Patient  [   ] Family  [   ] Other:    Primary Language: [  x ] English [   ] Other*:                      *Interpretation provided by:    SUPPORT DIAGNOSES            (Check all that apply)    [   ] EOL issues  [ x  ] Advanced Illness  [   ] Cultural / spiritual concerns  [   ] Pain / suffering  [   ] Dementia / AMS  [   ] Other:  [   ] AD issues  [   ] Grief / loss / sadness  [   ] Discharge issues  [ x  ] Distress / coping    PSYCHOSOCIAL ASSESSMENT OF PATIENT         (Check all that apply)    [  x ] Initial Assessment            [   ] Reassessment          [   ] Not Applicable this visit    Pain/suffering acuity:  [x   ] None to mild (0-3)           [   ] Moderate (4-6)        [   ] High (7-10)    Mental Status:  [  x ] Alert/oriented (x2-3)          [   ] Confused/Altered(x2/x1)         [   ] Non-resp    Functional status:  [   ] Independent w ADLs      [  x ] Needs Assistance             [   ] Bedbound/Full Care    Coping:  [   ] Coping well                     [  x ] Coping w/difficulty            [   ] Poor coping    Support system:  [   ] Strong                              [  x ] Adequate                        [   ] Inadequate      Past history and medications for:     [ ] Anxiety       [ ] Depression    [ ] Sleep disorders       SERVICE PROVIDED  [   ]Discharge support / facilitation  [   ]AD / goals of care counseling                                  [   ]EOL / death / bereavement counseling  [  x ]Counseling / support                                                [   ] Family meeting  [   ]Prayer / sacrament / ritual                                      [   ] Referral   [   ]Other                                                                       NOTE and Plan of Care (PoC):    patient is a 75 y/o M with pmhx of parkinson's, hx ichemic CVA x2, type 2 DM, dementia, chronic back pain, hx of seizures presenting from [facility] for generalized weakness, confusion, decreased PO intake and multiple witnessed falls. met with patient at bedside. patient encountered resting in bed. awake and alert. Briefly reviewed role of palliative SW. patient very soft spoken, however, he was able to deny any pain or discomfort. he was appreciative of the visit and asked for f/u. support rendered. provided my contact info. will follow for on going support. [id number]

## 2025-01-24 NOTE — DISCHARGE NOTE NURSING/CASE MANAGEMENT/SOCIAL WORK - NSDCVIVACCINE_GEN_ALL_CORE_FT
Tdap; 12-May-2020 22:10; Angelina Gomez (RN); [employer]; [id number] (Exp. Date: 19-Apr-2021); IntraMuscular; Deltoid Right.; 0.5 milliLiter(s); VIS (VIS Published: 09-May-2013, VIS Presented: 12-May-2020);

## 2025-01-25 LAB
ALBUMIN SERPL ELPH-MCNC: 3.7 G/DL — SIGNIFICANT CHANGE UP (ref 3.5–5.2)
ALP SERPL-CCNC: 86 U/L — SIGNIFICANT CHANGE UP (ref 30–115)
ALT FLD-CCNC: <5 U/L — SIGNIFICANT CHANGE UP (ref 0–41)
ANION GAP SERPL CALC-SCNC: 13 MMOL/L — SIGNIFICANT CHANGE UP (ref 7–14)
AST SERPL-CCNC: 12 U/L — SIGNIFICANT CHANGE UP (ref 0–41)
BASOPHILS # BLD AUTO: 0.02 K/UL — SIGNIFICANT CHANGE UP (ref 0–0.2)
BASOPHILS NFR BLD AUTO: 0.2 % — SIGNIFICANT CHANGE UP (ref 0–1)
BILIRUB SERPL-MCNC: 1.3 MG/DL — HIGH (ref 0.2–1.2)
BUN SERPL-MCNC: 12 MG/DL — SIGNIFICANT CHANGE UP (ref 10–20)
CALCIUM SERPL-MCNC: 8.5 MG/DL — SIGNIFICANT CHANGE UP (ref 8.4–10.5)
CHLORIDE SERPL-SCNC: 105 MMOL/L — SIGNIFICANT CHANGE UP (ref 98–110)
CO2 SERPL-SCNC: 23 MMOL/L — SIGNIFICANT CHANGE UP (ref 17–32)
CREAT SERPL-MCNC: 0.7 MG/DL — SIGNIFICANT CHANGE UP (ref 0.7–1.5)
EGFR: 98 ML/MIN/1.73M2 — SIGNIFICANT CHANGE UP
EOSINOPHIL # BLD AUTO: 0.12 K/UL — SIGNIFICANT CHANGE UP (ref 0–0.7)
EOSINOPHIL NFR BLD AUTO: 1.4 % — SIGNIFICANT CHANGE UP (ref 0–8)
GLUCOSE BLDC GLUCOMTR-MCNC: 105 MG/DL — HIGH (ref 70–99)
GLUCOSE BLDC GLUCOMTR-MCNC: 122 MG/DL — HIGH (ref 70–99)
GLUCOSE BLDC GLUCOMTR-MCNC: 206 MG/DL — HIGH (ref 70–99)
GLUCOSE BLDC GLUCOMTR-MCNC: 82 MG/DL — SIGNIFICANT CHANGE UP (ref 70–99)
GLUCOSE SERPL-MCNC: 70 MG/DL — SIGNIFICANT CHANGE UP (ref 70–99)
HCT VFR BLD CALC: 46.5 % — SIGNIFICANT CHANGE UP (ref 42–52)
HGB BLD-MCNC: 16.4 G/DL — SIGNIFICANT CHANGE UP (ref 14–18)
IMM GRANULOCYTES NFR BLD AUTO: 0.4 % — HIGH (ref 0.1–0.3)
LYMPHOCYTES # BLD AUTO: 1.72 K/UL — SIGNIFICANT CHANGE UP (ref 1.2–3.4)
LYMPHOCYTES # BLD AUTO: 20.5 % — SIGNIFICANT CHANGE UP (ref 20.5–51.1)
MAGNESIUM SERPL-MCNC: 1.8 MG/DL — SIGNIFICANT CHANGE UP (ref 1.8–2.4)
MCHC RBC-ENTMCNC: 33.9 PG — HIGH (ref 27–31)
MCHC RBC-ENTMCNC: 35.3 G/DL — SIGNIFICANT CHANGE UP (ref 32–37)
MCV RBC AUTO: 96.1 FL — HIGH (ref 80–94)
MONOCYTES # BLD AUTO: 0.67 K/UL — HIGH (ref 0.1–0.6)
MONOCYTES NFR BLD AUTO: 8 % — SIGNIFICANT CHANGE UP (ref 1.7–9.3)
NEUTROPHILS # BLD AUTO: 5.85 K/UL — SIGNIFICANT CHANGE UP (ref 1.4–6.5)
NEUTROPHILS NFR BLD AUTO: 69.5 % — SIGNIFICANT CHANGE UP (ref 42.2–75.2)
NRBC # BLD: 0 /100 WBCS — SIGNIFICANT CHANGE UP (ref 0–0)
NRBC BLD-RTO: 0 /100 WBCS — SIGNIFICANT CHANGE UP (ref 0–0)
PLATELET # BLD AUTO: 164 K/UL — SIGNIFICANT CHANGE UP (ref 130–400)
PMV BLD: 10.2 FL — SIGNIFICANT CHANGE UP (ref 7.4–10.4)
POTASSIUM SERPL-MCNC: 3.9 MMOL/L — SIGNIFICANT CHANGE UP (ref 3.5–5)
POTASSIUM SERPL-SCNC: 3.9 MMOL/L — SIGNIFICANT CHANGE UP (ref 3.5–5)
PROT SERPL-MCNC: 5.8 G/DL — LOW (ref 6–8)
RBC # BLD: 4.84 M/UL — SIGNIFICANT CHANGE UP (ref 4.7–6.1)
RBC # FLD: 11.3 % — LOW (ref 11.5–14.5)
SODIUM SERPL-SCNC: 141 MMOL/L — SIGNIFICANT CHANGE UP (ref 135–146)
WBC # BLD: 8.41 K/UL — SIGNIFICANT CHANGE UP (ref 4.8–10.8)
WBC # FLD AUTO: 8.41 K/UL — SIGNIFICANT CHANGE UP (ref 4.8–10.8)

## 2025-01-25 RX ORDER — RIVASTIGMINE 4.6 MG/24H
1 PATCH, EXTENDED RELEASE TRANSDERMAL
Qty: 30 | Refills: 0
Start: 2025-01-25 | End: 2025-02-23

## 2025-01-25 RX ORDER — ASPIRIN 81 MG/1
1 TABLET, COATED ORAL
Qty: 30 | Refills: 0
Start: 2025-01-25 | End: 2025-02-23

## 2025-01-25 RX ORDER — AMLODIPINE BESYLATE 5 MG
1 TABLET ORAL
Qty: 0 | Refills: 0 | DISCHARGE
Start: 2025-01-25

## 2025-01-25 RX ADMIN — ANTISEPTIC SURGICAL SCRUB 1 APPLICATION(S): 0.04 SOLUTION TOPICAL at 06:29

## 2025-01-25 RX ADMIN — RIVASTIGMINE 1 PATCH: 4.6 PATCH, EXTENDED RELEASE TRANSDERMAL at 20:01

## 2025-01-25 RX ADMIN — Medication 2 TABLET(S): at 22:03

## 2025-01-25 RX ADMIN — ASPIRIN 81 MILLIGRAM(S): 81 TABLET, COATED ORAL at 12:12

## 2025-01-25 RX ADMIN — Medication 2: at 11:45

## 2025-01-25 RX ADMIN — GABAPENTIN 100 MILLIGRAM(S): 800 TABLET ORAL at 22:05

## 2025-01-25 RX ADMIN — Medication 5 MILLIGRAM(S): at 06:29

## 2025-01-25 RX ADMIN — Medication 100 MILLIGRAM(S): at 22:03

## 2025-01-25 RX ADMIN — ENTACAPONE 200 MILLIGRAM(S): 200 TABLET, FILM COATED ORAL at 13:01

## 2025-01-25 RX ADMIN — ATORVASTATIN CALCIUM 80 MILLIGRAM(S): 80 TABLET, FILM COATED ORAL at 22:03

## 2025-01-25 RX ADMIN — RIVASTIGMINE 1 PATCH: 4.6 PATCH, EXTENDED RELEASE TRANSDERMAL at 06:28

## 2025-01-25 RX ADMIN — Medication 75 MILLIGRAM(S): at 12:12

## 2025-01-25 RX ADMIN — ENTACAPONE 200 MILLIGRAM(S): 200 TABLET, FILM COATED ORAL at 22:03

## 2025-01-25 RX ADMIN — Medication 3.12 MILLIGRAM(S): at 18:09

## 2025-01-25 RX ADMIN — Medication 1 TABLET(S): at 18:10

## 2025-01-25 RX ADMIN — MIRTAZAPINE 7.5 MILLIGRAM(S): 30 TABLET, FILM COATED ORAL at 12:13

## 2025-01-25 RX ADMIN — RIVASTIGMINE 1 PATCH: 4.6 PATCH, EXTENDED RELEASE TRANSDERMAL at 06:31

## 2025-01-25 RX ADMIN — LEVETIRACETAM 500 MILLIGRAM(S): 750 TABLET, FILM COATED ORAL at 06:29

## 2025-01-25 RX ADMIN — Medication 2 TABLET(S): at 13:01

## 2025-01-25 RX ADMIN — ENOXAPARIN SODIUM 40 MILLIGRAM(S): 100 INJECTION SUBCUTANEOUS at 06:29

## 2025-01-25 RX ADMIN — Medication 3.12 MILLIGRAM(S): at 06:30

## 2025-01-25 RX ADMIN — Medication 2 TABLET(S): at 06:29

## 2025-01-25 RX ADMIN — ENTACAPONE 200 MILLIGRAM(S): 200 TABLET, FILM COATED ORAL at 06:30

## 2025-01-25 RX ADMIN — LEVETIRACETAM 500 MILLIGRAM(S): 750 TABLET, FILM COATED ORAL at 18:10

## 2025-01-25 RX ADMIN — POLYETHYLENE GLYCOL 3350 17 GRAM(S): 17 POWDER, FOR SOLUTION ORAL at 12:12

## 2025-01-25 NOTE — PROGRESS NOTE ADULT - ASSESSMENT
Neuropsychology Initial Evaluation      Treatment Focused On: Initial Intake Cognitive Functioning      Pain: Denied     Orientation: X2      Arousal Level: Alert     Behavior: Pleasant and cooperative     Affect Range: Congruent with mood     Attention: WFL     Insight into illness/deficits: Fair          H&P: Neuropsychology was consulted to evaluate this 74-year-old-male following his admission to rehabilitation for left corona radiata infarct. He was brought to the ED via ambulance from [facility] due to generalized weakness, confusion, and multiple falls. He was admitted to medicine due to AMS. Information gathered from patient and collateral suggests that he has had a recent decline in mental status including expressive aphasia, impaired short-term memory, and confusion over the past few months. Evaluated by general neurology on 1/21 and noted to have R facial droop, brisk R patellar reflex, positive R Babinski reflex, a trembling in his lower left lip, and a left-handed resting tremor with a postural component. Apparently, R facial droop was first noted 1/20 by collateral/primary contact. CTH on 1/15 negative. MRI brain non con obtained overnight showing acute L corona radiata infarct. He has PMHx of Parkinson's disease, HTN, ischemic stroke, DM II, dementia and chronic back pain, seizures (Keppra).          Imaging:     MRI Brain w/o IV contrast (01/23/2025): There is stable moderate parenchymal atrophy. Stable area of restricted diffusion within the left corona radiata/basal ganglia consistent with acute infarct. No new infarct is demonstrated. Stable patchy and confluent T2/FLAIR signal hyperintensity within the cerebral hemispheric white matter consistent with chronic microvascular change. Scattered chronic lacunar infarcts are seen within the white matter and deep gray. Redemonstrated multiple foci of susceptibility effect within the brain consistent with previous microhemorrhages.           Social History/Premorbid Functioning     Patient was seen for a session following cognitive problems secondary to left corona radiata infarct. At this point of time, given his expressive aphasia a sufficient social history and premorbid functioning could not be gathered at this time. We will continue to attempt family contact to obtain additional information.            Neuropsychological testing was completed, and the following measures were given: Cognistat, CDT          Cognitive Results:      Visuospatial: Micrographia influenced visual planning.     Executive Functioning: Intact abstract reasoning and judgement, stimulus bound behavior was observed.     Attention: Mild impairment (DSF = 5)     Working Memory: Moderate-severe impairment (DSB = 2, mental calculations = 0/4)     Language: Impaired comprehension (1 step commands) and naming (2/8) - repetition was intact.     Memory: Poor learning as it took him seven trials to get 4/4 two times in a row. Further, repetition did not seem to be helpful – on some trials he had 4/4 and others 0/4. He recalled (0/4) and only recognized 1/4 of the words with multiple choice cues.             Clinical Impression: The patient exhibits significantly impaired cognitive functioning, although repetition abilities appear relatively preserved. This pattern suggests an acute neurological change following a stroke, exacerbating pre-existing cognitive impairments attributed to a major neurocognitive disorder due to multiple etiologies, including Parkinson's disease. The presence of Parkinson's disease contributes to a complex clinical profile, as it is a progressive condition impacting both motor skills and speech, which further challenges the patient's potential for independence. Prognosis for cognitive improvement is considered fair; while some gains may be achieved through rehabilitation, the progressive nature of the underlying conditions will likely limit the extent of recovery and contribute to ongoing dependency in daily activities.         Recommendations      Simplify instructions: Tasks should be broken down into step by step commands.     Caregiver Involvement: Engage the patient’s family in his rehabilitation plan. Educate loved ones on the importance of consistent routine, positive reinforcement, and patience when assisting with his cognitive and physical rehabilitation.          Will retain in NP service to continue to monitor cognition.

## 2025-01-25 NOTE — PROGRESS NOTE ADULT - ASSESSMENT
74M PMH of Parkinson's, HTN, hx ischemic CVA x2, type 2 DM, dementia and chronic back pain, Hx of seizures on keppra BIBA from [place] facility  or generalized weakness, confusion, decreased PO intake and multiple witnessed falls over the past few days. He had two witnessed falls the day prior to admission where he was weak and slumped to the ground. No LOC, not on AC. Per staff, he is normally alert and oriented x 3 and able to perform ADLs, but for the past few days has been increasingly confused. He was evaluated in the ED the day prior for similar complaints, had a negative work up and was discharged. Infectious and trauma workup negative. On 1/21 patient was notes to have expressive aphasia and R facial droop. Evaluated by neurology, MRI ordered and showed acute stroke in the L corona radiata. CTA Head/Neck showed severe stenosis left MCA proximal M2. Moderate narrowing right proximal vertebral arter On 1/22 new onset L tongue deviation and mild dysarthria - repeat MRI brain with no new acute findings. ILR placed 1/24. NIHSS 4 on admission.    #Rehab of gait abnormality, dysarhtira, and expressive aphasia iso acute L corona radiata stroke  -Imaging as above  -s/p ILR 1/24  -s/p  EEG 1/22: generalized slowing, no seizures  - A1C 4.9, , TSH 2.81  -c/w ASA and plavix for 90 days  -c/w lipitor 80mg qhs  -PT/OT/SLP/Neuropsych eval and treat     #HTN  -c/w amlodipine 5mg daily  -c/w coreg 3.125mg q12h  -Monitor BP     #HLD  -c/w lipitor 80mg qhs    #DM2  - A1C with Estimated Average Glucose Result: 4.9: Method: Immunoassay - well controlled    #Hx of seizures  -c/w keppra 500 bid    #Hx of Parkinson’s  #Hx of dementia. Mild and does not interfere with patient's ability to participate in and benefit from acute rehab.  -c/w home meds sinemet, entacapone, and rivastigmine     #Anxiety/depression  #Insomnia  -c/w mirtazapine 7.5 daily  -c/w trazadone 100mg qhs  -c/w melatonin 3mg qhs prn       -Pain control: tylenol prn    -GI/Bowel Mgmt: senna/miralax    -Bladder management:      -Skin:  No active issues at this time    -FEN     - Diet: Easy to chew with thins DASH/carb consistent           Precautions / PROPHYLAXIS:      - Falls      - DVT prophylaxis: Lovinox

## 2025-01-25 NOTE — PROGRESS NOTE ADULT - SUBJECTIVE AND OBJECTIVE BOX
Patient is a 74y old  Male who presents with a chief complaint of acute stroke and Parkinson's Syndrome with decline in function (24 Jan 2025 13:28)      HPI:    74M PMH of Parkinson's, HTN, hx ischemic CVA x2, type 2 DM, dementia and chronic back pain, Hx of seizures on keppra B[facility]  or generalized weakness, confusion, decreased PO intake and multiple witnessed falls over the past few days. He had two witnessed falls the day prior to admission where he was weak and slumped to the ground. No LOC, not on AC. Per staff, he is normally alert and oriented x 3 and able to perform ADLs, but for the past few days has been increasingly confused. He was evaluated in the ED the day prior for similar complaints, had a negative work up and was discharged. Infectious and trauma workup negative. On 1/21 patient was notes to have expressive aphasia and R facial droop. Evaluated by neurology, MRI ordered and showed acute stroke in the L corona radiata. CTA Head/Neck showed severe stenosis left MCA proximal M2. Moderate narrowing right proximal vertebral arter On 1/22 new onset L tongue deviation and mild dysarthria - repeat MRI brain with no new acute findings. ILR placed 1/24.    Prior to admission, the patient was independent in ADLs and ambulation without an assistive device Patient now requires Reza for bed mobility, modA for transfers, ambulating 40’x2 with RW and modA,  modA for LBD. Therefore, there is a significant functional decline from baseline. Patients also with medical comorbidities of DM, HTN, requiring medication management and monitoring of vitals by Physiatry team and nursing. To return home it is in the patient’s best interest to have acute inpatient rehabilitative services to undergo intensive interdisciplinary therapy. Furthermore, the patient is motivated and is able to tolerate up to 3 hours of daily therapy for 5-6 days a week for a total of 15 hours a week. Evaluated by Physiatry and deemed to be an excellent candidate for admission to  for acute inpatient rehab. Admitted to Acute Rehab on 1/24/25    LS: Lives alone in a private home, 6STE  PLOF: independent in ADLs and ambulation with no AS     (24 Jan 2025 13:28)      I examined the patient and reviewed the chart. There have been no significant changes since my history and physical except where documented below.    TODAY'S SUBJECTIVE & REVIEW OF SYMPTOMS:    Patient noted to be impulsive by nursing overnight, has a sitter at beside for safety  no other events. Seen at bedside today. Doing well with no complaints. Vitals and labs reviewed Bowel and bladder continent .  Eager to start therapy.      Constitutional:    [   ] WNL           [   ] poor appetite   [   ] insomnia   [   ] tired [ x] weak   Cardio:                [ x  ] WNL           [   ] CP   [   ] SY   [   ] palpitations               Resp:                   [ x  ] WNL           [   ] SOB   [   ] cough   [   ] wheezing   GI:                        [  x ] WNL           [   ] constipation   [   ] diarrhea   [   ] abdominal pain   [   ] nausea   [   ] emesis                                :                      [   x] WNL           [   ] LAM  [   ] dusuria   [   ] difficulty voiding             Endo:                   [  x ] WNL          [   ] po;yuria   [   ] temperature intolerance                 Skin:                     [  x ] WNL          [   ] pain   [   ] wound   [   ] rash   MSK:                    [  x ] WNL          [   ] muscle pain   [   ] joint pain/ stiffness   [   ] muscle tenderness   [   ] swelling   Neuro:                 [ x  ] WNL          [   ] HA   [   ] change in vision   [   ] tremor   [   ] weakness   [   ]dysphagia              Cognitive:           [   ] WNL           [ x  ]confusion at times   Psych:                  [   ] WNL           [   ] hallucinations   [   ]agitation   [   ] delusion   [ x  ]depression/ anxiety    PHYSICAL EXAM    Vital Signs Last 24 Hrs  T(C): 36.4 (25 Jan 2025 05:43), Max: 36.6 (24 Jan 2025 13:29)  T(F): 97.6 (25 Jan 2025 05:43), Max: 97.9 (24 Jan 2025 13:29)  HR: 85 (25 Jan 2025 05:43) (76 - 85)  BP: 150/86 (25 Jan 2025 06:31) (150/86 - 160/97)  BP(mean): 118 (24 Jan 2025 13:29) (118 - 118)  RR: 18 (25 Jan 2025 05:43) (18 - 19)  SpO2: 99% (24 Jan 2025 13:29) (99% - 99%)    General:[x   ] NAD, Resting Comfortable,   [   ] other:                                HEENT: [ x  ] NC/AT, EOMI, PERRL , Normal Conjunctivae,   [   ] other:  Cardio: [  x ] RRR, no murmer,   [   ] other:                              Pulm: [  x ] No Respiratory Distress,  Lungs CTAB,   [   ] other:                       Abdomen: [x   ]ND/NT, Soft,   [   ] other:    : [ x  ] NO LAM CATHETER, [   ] LAM CATHETER- no meatal tear, no discharge, [   ] other:                                            MSK: [x   ] No joint swelling, Full ROM,   [   ] other:                                         Ext: [  x ]No C/C/E, No calf tenderness,   [   ]other:    Skin: [ x  ]intact,   [   ] other:                                                                   Neurological Examination:  Cognitive: [    ] AAO x 3,   [ x   ]  other: AOx2 - not space,                                                                      Attention:  [  x  ] intact,   [  ]  other:                         Memory: [    ] intact,    [ x   ]  other:   2/3 delayed recall with verbal cues  Mood/Affect: [  x  ] wnl,    [    ]  other:                                                                             Communication: [    ]Fluent, no dysarthria, following commands:  [   x ] other: mild dysarthria, expressive aphasia, mild anomia, hypophonia   CN II - XII:  [    ] intact,  [ x   ] other: R facial droop                                                                                        Motor:   RIGHT UE: [  x ] WNL,  [   ] other:  LEFT    UE: [  x ] WNL,  [   ] other:  RIGHT LE: [ x  ] WNL,  [   ] other:   LEFT    LE: [x   ] WNL,  [   ] other:    Tone: [  x ] wnl,   [    ]  other:  DTRs: [   ]symmetric, [ x  ] other: R patellar reflex brisk 3+  Coordination:   [x    ] intact,   [    ] other:                                                                           Sensory: [   x ] Intact to light touch,   [    ] other:      acetaminophen     Tablet .. 650 milliGRAM(s) Oral every 6 hours PRN  aluminum hydroxide/magnesium hydroxide/simethicone Suspension 30 milliLiter(s) Oral every 4 hours PRN  amLODIPine   Tablet 5 milliGRAM(s) Oral daily  aspirin  chewable 81 milliGRAM(s) Oral daily  atorvastatin 80 milliGRAM(s) Oral at bedtime  carbidopa/levodopa  25/100 2 Tablet(s) Oral three times a day  carbidopa/levodopa CR 50/200 1 Tablet(s) Oral <User Schedule>  carvedilol 3.125 milliGRAM(s) Oral every 12 hours  chlorhexidine 2% Cloths 1 Application(s) Topical <User Schedule>  clopidogrel Tablet 75 milliGRAM(s) Oral daily  dextrose 5%. 1000 milliLiter(s) IV Continuous <Continuous>  dextrose 5%. 1000 milliLiter(s) IV Continuous <Continuous>  dextrose 50% Injectable 25 Gram(s) IV Push once  dextrose Oral Gel 15 Gram(s) Oral once PRN  enoxaparin Injectable 40 milliGRAM(s) SubCutaneous every 24 hours  entacapone 200 milliGRAM(s) Oral three times a day  gabapentin 100 milliGRAM(s) Oral at bedtime  glucagon  Injectable 1 milliGRAM(s) IntraMuscular once  insulin lispro (ADMELOG) corrective regimen sliding scale   SubCutaneous three times a day before meals  levETIRAcetam 500 milliGRAM(s) Oral two times a day  magnesium hydroxide Suspension 30 milliLiter(s) Oral daily PRN  melatonin 3 milliGRAM(s) Oral at bedtime PRN  mirtazapine 7.5 milliGRAM(s) Oral daily  polyethylene glycol 3350 17 Gram(s) Oral daily  rivastigmine patch  9.5 mG/24 Hr(s). 1 Patch Transdermal every 24 hours  senna 2 Tablet(s) Oral at bedtime  traZODone 100 milliGRAM(s) Oral at bedtime      RECENT LABS/IMAGING                        16.4   8.41  )-----------( 164      ( 25 Jan 2025 07:36 )             46.5     01-25    141  |  105  |  12  ----------------------------<  70  3.9   |  23  |  0.7    Ca    8.5      25 Jan 2025 07:36  Phos  2.7     01-24  Mg     1.8     01-25    TPro  5.8[L]  /  Alb  3.7  /  TBili  1.3[H]  /  DBili  x   /  AST  12  /  ALT  <5  /  AlkPhos  86  01-25      Urinalysis Basic - ( 25 Jan 2025 07:36 )    Color: x / Appearance: x / SG: x / pH: x  Gluc: 70 mg/dL / Ketone: x  / Bili: x / Urobili: x   Blood: x / Protein: x / Nitrite: x   Leuk Esterase: x / RBC: x / WBC x   Sq Epi: x / Non Sq Epi: x / Bacteria: x

## 2025-01-26 LAB
GLUCOSE BLDC GLUCOMTR-MCNC: 103 MG/DL — HIGH (ref 70–99)
GLUCOSE BLDC GLUCOMTR-MCNC: 116 MG/DL — HIGH (ref 70–99)
GLUCOSE BLDC GLUCOMTR-MCNC: 118 MG/DL — HIGH (ref 70–99)
GLUCOSE BLDC GLUCOMTR-MCNC: 90 MG/DL — SIGNIFICANT CHANGE UP (ref 70–99)

## 2025-01-26 RX ORDER — CARVEDILOL 6.25 MG
3.12 TABLET ORAL EVERY 12 HOURS
Refills: 0 | Status: DISCONTINUED | OUTPATIENT
Start: 2025-01-26 | End: 2025-02-12

## 2025-01-26 RX ADMIN — RIVASTIGMINE 1 PATCH: 4.6 PATCH, EXTENDED RELEASE TRANSDERMAL at 06:16

## 2025-01-26 RX ADMIN — ANTISEPTIC SURGICAL SCRUB 1 APPLICATION(S): 0.04 SOLUTION TOPICAL at 06:16

## 2025-01-26 RX ADMIN — ENTACAPONE 200 MILLIGRAM(S): 200 TABLET, FILM COATED ORAL at 06:09

## 2025-01-26 RX ADMIN — Medication 2 TABLET(S): at 21:11

## 2025-01-26 RX ADMIN — Medication 3.12 MILLIGRAM(S): at 17:54

## 2025-01-26 RX ADMIN — Medication 75 MILLIGRAM(S): at 11:53

## 2025-01-26 RX ADMIN — ATORVASTATIN CALCIUM 80 MILLIGRAM(S): 80 TABLET, FILM COATED ORAL at 21:10

## 2025-01-26 RX ADMIN — Medication 2 TABLET(S): at 06:08

## 2025-01-26 RX ADMIN — GABAPENTIN 100 MILLIGRAM(S): 800 TABLET ORAL at 21:10

## 2025-01-26 RX ADMIN — RIVASTIGMINE 1 PATCH: 4.6 PATCH, EXTENDED RELEASE TRANSDERMAL at 06:09

## 2025-01-26 RX ADMIN — POLYETHYLENE GLYCOL 3350 17 GRAM(S): 17 POWDER, FOR SOLUTION ORAL at 11:52

## 2025-01-26 RX ADMIN — Medication 2 TABLET(S): at 13:01

## 2025-01-26 RX ADMIN — LEVETIRACETAM 500 MILLIGRAM(S): 750 TABLET, FILM COATED ORAL at 06:09

## 2025-01-26 RX ADMIN — LEVETIRACETAM 500 MILLIGRAM(S): 750 TABLET, FILM COATED ORAL at 17:54

## 2025-01-26 RX ADMIN — ASPIRIN 81 MILLIGRAM(S): 81 TABLET, COATED ORAL at 11:53

## 2025-01-26 RX ADMIN — Medication 1 TABLET(S): at 17:54

## 2025-01-26 RX ADMIN — ENTACAPONE 200 MILLIGRAM(S): 200 TABLET, FILM COATED ORAL at 13:01

## 2025-01-26 RX ADMIN — Medication 2 TABLET(S): at 21:10

## 2025-01-26 RX ADMIN — MIRTAZAPINE 7.5 MILLIGRAM(S): 30 TABLET, FILM COATED ORAL at 11:53

## 2025-01-26 RX ADMIN — ENOXAPARIN SODIUM 40 MILLIGRAM(S): 100 INJECTION SUBCUTANEOUS at 06:08

## 2025-01-26 RX ADMIN — Medication 100 MILLIGRAM(S): at 21:11

## 2025-01-26 RX ADMIN — ENTACAPONE 200 MILLIGRAM(S): 200 TABLET, FILM COATED ORAL at 21:10

## 2025-01-26 NOTE — PROGRESS NOTE ADULT - SUBJECTIVE AND OBJECTIVE BOX
Patient is a 74y old  Male who presents with a chief complaint of acute stroke and Parkinson's Syndrome with decline in function (24 Jan 2025 13:28)      HPI:    74M PMH of Parkinson's, HTN, hx ischemic CVA x2, type 2 DM, dementia and chronic back pain, Hx of seizures on keppra BIBA from [place] facility  or generalized weakness, confusion, decreased PO intake and multiple witnessed falls over the past few days. He had two witnessed falls the day prior to admission where he was weak and slumped to the ground. No LOC, not on AC. Per staff, he is normally alert and oriented x 3 and able to perform ADLs, but for the past few days has been increasingly confused. He was evaluated in the ED the day prior for similar complaints, had a negative work up and was discharged. Infectious and trauma workup negative. On 1/21 patient was notes to have expressive aphasia and R facial droop. Evaluated by neurology, MRI ordered and showed acute stroke in the L corona radiata. CTA Head/Neck showed severe stenosis left MCA proximal M2. Moderate narrowing right proximal vertebral arter On 1/22 new onset L tongue deviation and mild dysarthria - repeat MRI brain with no new acute findings. ILR placed 1/24.    Prior to admission, the patient was independent in ADLs and ambulation without an assistive device Patient now requires Reza for bed mobility, modA for transfers, ambulating 40’x2 with RW and modA,  modA for LBD. Therefore, there is a significant functional decline from baseline. Patients also with medical comorbidities of DM, HTN, requiring medication management and monitoring of vitals by Physiatry team and nursing. To return home it is in the patient’s best interest to have acute inpatient rehabilitative services to undergo intensive interdisciplinary therapy. Furthermore, the patient is motivated and is able to tolerate up to 3 hours of daily therapy for 5-6 days a week for a total of 15 hours a week. Evaluated by Physiatry and deemed to be an excellent candidate for admission to  for acute inpatient rehab. Admitted to Acute Rehab on 1/24/25    LS: Lives alone in a private home, 6STE  PLOF: independent in ADLs and ambulation with no AS     (24 Jan 2025 13:28)      I examined the patient and reviewed the chart. There have been no significant changes since my history and physical except where documented below.    TODAY'S SUBJECTIVE & REVIEW OF SYMPTOMS:    No acute overnight events. Has a sitter at beside for safety. Coreg and amlodipine held this AM for SBP under 110. Seen at bedside today. Doing well with no complaints. Vitals and labs reviewed Bowel and bladder continent .  Tolerating therapy.      Constitutional:    [   ] WNL           [   ] poor appetite   [   ] insomnia   [   ] tired [ x] weak   Cardio:                [ x  ] WNL           [   ] CP   [   ] SY   [   ] palpitations               Resp:                   [ x  ] WNL           [   ] SOB   [   ] cough   [   ] wheezing   GI:                        [  x ] WNL           [   ] constipation   [   ] diarrhea   [   ] abdominal pain   [   ] nausea   [   ] emesis                                :                      [   x] WNL           [   ] LAM  [   ] dusuria   [   ] difficulty voiding             Endo:                   [  x ] WNL          [   ] po;yuria   [   ] temperature intolerance                 Skin:                     [  x ] WNL          [   ] pain   [   ] wound   [   ] rash   MSK:                    [  x ] WNL          [   ] muscle pain   [   ] joint pain/ stiffness   [   ] muscle tenderness   [   ] swelling   Neuro:                 [ x  ] WNL          [   ] HA   [   ] change in vision   [   ] tremor   [   ] weakness   [   ]dysphagia              Cognitive:           [   ] WNL           [ x  ]confusion at times   Psych:                  [   ] WNL           [   ] hallucinations   [   ]agitation   [   ] delusion   [ x  ]depression/ anxiety    PHYSICAL EXAM    Vital Signs Last 24 Hrs  T(C): 36.5 (26 Jan 2025 05:19), Max: 36.9 (25 Jan 2025 14:05)  T(F): 97.7 (26 Jan 2025 05:19), Max: 98.4 (25 Jan 2025 14:05)  HR: 65 (26 Jan 2025 05:19) (65 - 101)  BP: 117/79 (26 Jan 2025 05:19) (117/79 - 155/109)  BP(mean): 102 (25 Jan 2025 22:13) (102 - 124)  RR: 18 (26 Jan 2025 05:19) (17 - 18)  SpO2: 97% (25 Jan 2025 20:02) (97% - 98%)    Parameters below as of 25 Jan 2025 20:02  Patient On (Oxygen Delivery Method): room air    General:[x   ] NAD, Resting Comfortable,   [   ] other:                                HEENT: [ x  ] NC/AT, EOMI, PERRL , Normal Conjunctivae,   [   ] other:  Cardio: [  x ] RRR, no murmer,   [   ] other:                              Pulm: [  x ] No Respiratory Distress,  Lungs CTAB,   [   ] other:                       Abdomen: [x   ]ND/NT, Soft,   [   ] other:    : [ x  ] NO LAM CATHETER, [   ] LAM CATHETER- no meatal tear, no discharge, [   ] other:                                            MSK: [x   ] No joint swelling, Full ROM,   [   ] other:                                         Ext: [  x ]No C/C/E, No calf tenderness,   [   ]other:    Skin: [ x  ]intact,   [   ] other:                                                                   Neurological Examination:  Cognitive: [    ] AAO x 3,   [ x   ]  other: AOx2 - not space,                                                                      Attention:  [  x  ] intact,   [  ]  other:                         Memory: [    ] intact,    [ x   ]  other:   2/3 delayed recall with verbal cues  Mood/Affect: [  x  ] wnl,    [    ]  other:                                                                             Communication: [    ]Fluent, no dysarthria, following commands:  [   x ] other: mild dysarthria, expressive aphasia, mild anomia, hypophonia   CN II - XII:  [    ] intact,  [ x   ] other: R facial droop                                                                                        Motor:   RIGHT UE: [  x ] WNL,  [   ] other:  LEFT    UE: [  x ] WNL,  [   ] other:  RIGHT LE: [ x  ] WNL,  [   ] other:   LEFT    LE: [x   ] WNL,  [   ] other:    Tone: [  x ] wnl,   [    ]  other:  DTRs: [   ]symmetric, [ x  ] other: R patellar reflex brisk 3+  Coordination:   [x    ] intact,   [    ] other:                                                                           Sensory: [   x ] Intact to light touch,   [    ] other:      MEDICATIONS  (STANDING):  amLODIPine   Tablet 5 milliGRAM(s) Oral daily  aspirin  chewable 81 milliGRAM(s) Oral daily  atorvastatin 80 milliGRAM(s) Oral at bedtime  carbidopa/levodopa  25/100 2 Tablet(s) Oral three times a day  carbidopa/levodopa CR 50/200 1 Tablet(s) Oral <User Schedule>  carvedilol 3.125 milliGRAM(s) Oral every 12 hours  chlorhexidine 2% Cloths 1 Application(s) Topical <User Schedule>  clopidogrel Tablet 75 milliGRAM(s) Oral daily  dextrose 5%. 1000 milliLiter(s) (100 mL/Hr) IV Continuous <Continuous>  dextrose 5%. 1000 milliLiter(s) (50 mL/Hr) IV Continuous <Continuous>  dextrose 50% Injectable 25 Gram(s) IV Push once  enoxaparin Injectable 40 milliGRAM(s) SubCutaneous every 24 hours  entacapone 200 milliGRAM(s) Oral three times a day  gabapentin 100 milliGRAM(s) Oral at bedtime  glucagon  Injectable 1 milliGRAM(s) IntraMuscular once  insulin lispro (ADMELOG) corrective regimen sliding scale   SubCutaneous three times a day before meals  levETIRAcetam 500 milliGRAM(s) Oral two times a day  mirtazapine 7.5 milliGRAM(s) Oral daily  polyethylene glycol 3350 17 Gram(s) Oral daily  rivastigmine patch  9.5 mG/24 Hr(s). 1 Patch Transdermal every 24 hours  senna 2 Tablet(s) Oral at bedtime  traZODone 100 milliGRAM(s) Oral at bedtime    MEDICATIONS  (PRN):  acetaminophen     Tablet .. 650 milliGRAM(s) Oral every 6 hours PRN Temp greater or equal to 38C (100.4F), Mild Pain (1 - 3)  aluminum hydroxide/magnesium hydroxide/simethicone Suspension 30 milliLiter(s) Oral every 4 hours PRN Dyspepsia  dextrose Oral Gel 15 Gram(s) Oral once PRN Blood Glucose LESS THAN 70 milliGRAM(s)/deciliter  magnesium hydroxide Suspension 30 milliLiter(s) Oral daily PRN Constipation  melatonin 3 milliGRAM(s) Oral at bedtime PRN Insomnia    RECENT LABS/IMAGING                        16.4   8.41  )-----------( 164      ( 25 Jan 2025 07:36 )             46.5     01-25    141  |  105  |  12  ----------------------------<  70  3.9   |  23  |  0.7    Ca    8.5      25 Jan 2025 07:36  Phos  2.7     01-24  Mg     1.8     01-25    TPro  5.8[L]  /  Alb  3.7  /  TBili  1.3[H]  /  DBili  x   /  AST  12  /  ALT  <5  /  AlkPhos  86  01-25      Urinalysis Basic - ( 25 Jan 2025 07:36 )    Color: x / Appearance: x / SG: x / pH: x  Gluc: 70 mg/dL / Ketone: x  / Bili: x / Urobili: x   Blood: x / Protein: x / Nitrite: x   Leuk Esterase: x / RBC: x / WBC x   Sq Epi: x / Non Sq Epi: x / Bacteria: x   Patient is a 74y old  Male who presents with a chief complaint of acute stroke and Parkinson's Syndrome with decline in function (24 Jan 2025 13:28)      HPI:    74M PMH of Parkinson's, HTN, hx ischemic CVA x2, type 2 DM, dementia and chronic back pain, Hx of seizures on keppra BIBA from [place] facility  or generalized weakness, confusion, decreased PO intake and multiple witnessed falls over the past few days. He had two witnessed falls the day prior to admission where he was weak and slumped to the ground. No LOC, not on AC. Per staff, he is normally alert and oriented x 3 and able to perform ADLs, but for the past few days has been increasingly confused. He was evaluated in the ED the day prior for similar complaints, had a negative work up and was discharged. Infectious and trauma workup negative. On 1/21 patient was notes to have expressive aphasia and R facial droop. Evaluated by neurology, MRI ordered and showed acute stroke in the L corona radiata. CTA Head/Neck showed severe stenosis left MCA proximal M2. Moderate narrowing right proximal vertebral arter On 1/22 new onset L tongue deviation and mild dysarthria - repeat MRI brain with no new acute findings. ILR placed 1/24.    Prior to admission, the patient was independent in ADLs and ambulation without an assistive device Patient now requires Reza for bed mobility, modA for transfers, ambulating 40’x2 with RW and modA,  modA for LBD. Therefore, there is a significant functional decline from baseline. Patients also with medical comorbidities of DM, HTN, requiring medication management and monitoring of vitals by Physiatry team and nursing. To return home it is in the patient’s best interest to have acute inpatient rehabilitative services to undergo intensive interdisciplinary therapy. Furthermore, the patient is motivated and is able to tolerate up to 3 hours of daily therapy for 5-6 days a week for a total of 15 hours a week. Evaluated by Physiatry and deemed to be an excellent candidate for admission to  for acute inpatient rehab. Admitted to Acute Rehab on 1/24/25    LS: Lives alone in a private home, 6STE  PLOF: independent in ADLs and ambulation with no AS     (24 Jan 2025 13:28)      I examined the patient and reviewed the chart. There have been no significant changes since my history and physical except where documented below.    TODAY'S SUBJECTIVE & REVIEW OF SYMPTOMS:    No acute overnight events. Has a sitter at beside for safety. Coreg and amlodipine held this AM for SBP under 110. Seen at bedside today. Notes his mood is depressed and is hoping he can recover from his stroke. No suicidal ideation.  No acute complaints. Vitals and labs reviewed Bowel and bladder continent .  Tolerating therapy.      Constitutional:    [   ] WNL           [   ] poor appetite   [   ] insomnia   [   ] tired [ x] weak   Cardio:                [ x  ] WNL           [   ] CP   [   ] SY   [   ] palpitations               Resp:                   [ x  ] WNL           [   ] SOB   [   ] cough   [   ] wheezing   GI:                        [  x ] WNL           [   ] constipation   [   ] diarrhea   [   ] abdominal pain   [   ] nausea   [   ] emesis                                :                      [   x] WNL           [   ] LAM  [   ] dysuria   [   ] difficulty voiding             Endo:                   [  x ] WNL          [   ] polyuria   [   ] temperature intolerance                 Skin:                     [  x ] WNL          [   ] pain   [   ] wound   [   ] rash   MSK:                    [  x ] WNL          [   ] muscle pain   [   ] joint pain/ stiffness   [   ] muscle tenderness   [   ] swelling   Neuro:                 [ x  ] WNL          [   ] HA   [   ] change in vision   [   ] tremor   [   ] weakness   [   ]dysphagia              Cognitive:           [   ] WNL           [ x  ]confusion at times   Psych:                  [   ] WNL           [   ] hallucinations   [   ]agitation   [   ] delusion   [ x  ]depression/ anxiety    PHYSICAL EXAM    Vital Signs Last 24 Hrs  T(C): 36.5 (26 Jan 2025 05:19), Max: 36.9 (25 Jan 2025 14:05)  T(F): 97.7 (26 Jan 2025 05:19), Max: 98.4 (25 Jan 2025 14:05)  HR: 65 (26 Jan 2025 05:19) (65 - 101)  BP: 117/79 (26 Jan 2025 05:19) (117/79 - 155/109)  BP(mean): 102 (25 Jan 2025 22:13) (102 - 124)  RR: 18 (26 Jan 2025 05:19) (17 - 18)  SpO2: 97% (25 Jan 2025 20:02) (97% - 98%)    Parameters below as of 25 Jan 2025 20:02  Patient On (Oxygen Delivery Method): room air    General:[x   ] NAD, Resting Comfortable,   [   ] other:                                HEENT: [ x  ] NC/AT, EOMI, PERRL , Normal Conjunctivae,   [   ] other:  Cardio: [  x ] RRR, no murmer,   [   ] other:                              Pulm: [  x ] No Respiratory Distress,  Lungs CTAB,   [   ] other:                       Abdomen: [x   ]ND/NT, Soft,   [   ] other:    : [ x  ] NO LAM CATHETER, [   ] LAM CATHETER- no meatal tear, no discharge, [   ] other:                                            MSK: [x   ] No joint swelling, Full ROM,   [   ] other:                                         Ext: [  x ]No C/C/E, No calf tenderness,   [   ]other:    Skin: [ x  ]intact,   [   ] other:                                                                   Neurological Examination:  Cognitive: [    ] AAO x 3,   [ x   ]  other: AOx2 - not space,                                                                      Attention:  [  x  ] intact,   [  ]  other:                         Memory: [    ] intact,    [ x   ]  other:   2/3 delayed recall with verbal cues  Mood/Affect: [  x  ] wnl,    [    ]  other:                                                                             Communication: [    ]Fluent, no dysarthria, following commands:  [   x ] other: mild dysarthria, expressive aphasia, mild anomia, hypophonia   CN II - XII:  [    ] intact,  [ x   ] other: R facial droop                                                                                        Motor:   RIGHT UE: [  x ] WNL,  [   ] other:  LEFT    UE: [  x ] WNL,  [   ] other:  RIGHT LE: [ x  ] WNL,  [   ] other:   LEFT    LE: [x   ] WNL,  [   ] other:    Tone: [  x ] wnl,   [    ]  other:  DTRs: [   ]symmetric, [ x  ] other: R patellar reflex brisk 3+  Coordination:   [x    ] intact,   [    ] other:                                                                           Sensory: [   x ] Intact to light touch,   [    ] other:      MEDICATIONS  (STANDING):  amLODIPine   Tablet 5 milliGRAM(s) Oral daily  aspirin  chewable 81 milliGRAM(s) Oral daily  atorvastatin 80 milliGRAM(s) Oral at bedtime  carbidopa/levodopa  25/100 2 Tablet(s) Oral three times a day  carbidopa/levodopa CR 50/200 1 Tablet(s) Oral <User Schedule>  carvedilol 3.125 milliGRAM(s) Oral every 12 hours  chlorhexidine 2% Cloths 1 Application(s) Topical <User Schedule>  clopidogrel Tablet 75 milliGRAM(s) Oral daily  dextrose 5%. 1000 milliLiter(s) (100 mL/Hr) IV Continuous <Continuous>  dextrose 5%. 1000 milliLiter(s) (50 mL/Hr) IV Continuous <Continuous>  dextrose 50% Injectable 25 Gram(s) IV Push once  enoxaparin Injectable 40 milliGRAM(s) SubCutaneous every 24 hours  entacapone 200 milliGRAM(s) Oral three times a day  gabapentin 100 milliGRAM(s) Oral at bedtime  glucagon  Injectable 1 milliGRAM(s) IntraMuscular once  insulin lispro (ADMELOG) corrective regimen sliding scale   SubCutaneous three times a day before meals  levETIRAcetam 500 milliGRAM(s) Oral two times a day  mirtazapine 7.5 milliGRAM(s) Oral daily  polyethylene glycol 3350 17 Gram(s) Oral daily  rivastigmine patch  9.5 mG/24 Hr(s). 1 Patch Transdermal every 24 hours  senna 2 Tablet(s) Oral at bedtime  traZODone 100 milliGRAM(s) Oral at bedtime    MEDICATIONS  (PRN):  acetaminophen     Tablet .. 650 milliGRAM(s) Oral every 6 hours PRN Temp greater or equal to 38C (100.4F), Mild Pain (1 - 3)  aluminum hydroxide/magnesium hydroxide/simethicone Suspension 30 milliLiter(s) Oral every 4 hours PRN Dyspepsia  dextrose Oral Gel 15 Gram(s) Oral once PRN Blood Glucose LESS THAN 70 milliGRAM(s)/deciliter  magnesium hydroxide Suspension 30 milliLiter(s) Oral daily PRN Constipation  melatonin 3 milliGRAM(s) Oral at bedtime PRN Insomnia    RECENT LABS/IMAGING                        16.4   8.41  )-----------( 164      ( 25 Jan 2025 07:36 )             46.5     01-25    141  |  105  |  12  ----------------------------<  70  3.9   |  23  |  0.7    Ca    8.5      25 Jan 2025 07:36  Phos  2.7     01-24  Mg     1.8     01-25    TPro  5.8[L]  /  Alb  3.7  /  TBili  1.3[H]  /  DBili  x   /  AST  12  /  ALT  <5  /  AlkPhos  86  01-25      Urinalysis Basic - ( 25 Jan 2025 07:36 )    Color: x / Appearance: x / SG: x / pH: x  Gluc: 70 mg/dL / Ketone: x  / Bili: x / Urobili: x   Blood: x / Protein: x / Nitrite: x   Leuk Esterase: x / RBC: x / WBC x   Sq Epi: x / Non Sq Epi: x / Bacteria: x

## 2025-01-26 NOTE — PROGRESS NOTE ADULT - ASSESSMENT
74M PMH of Parkinson's, HTN, hx ischemic CVA x2, type 2 DM, dementia and chronic back pain, Hx of seizures on keppra BIBA from [place] facility  or generalized weakness, confusion, decreased PO intake and multiple witnessed falls over the past few days. He had two witnessed falls the day prior to admission where he was weak and slumped to the ground. No LOC, not on AC. Per staff, he is normally alert and oriented x 3 and able to perform ADLs, but for the past few days has been increasingly confused. He was evaluated in the ED the day prior for similar complaints, had a negative work up and was discharged. Infectious and trauma workup negative. On 1/21 patient was notes to have expressive aphasia and R facial droop. Evaluated by neurology, MRI ordered and showed acute stroke in the L corona radiata. CTA Head/Neck showed severe stenosis left MCA proximal M2. Moderate narrowing right proximal vertebral arter On 1/22 new onset L tongue deviation and mild dysarthria - repeat MRI brain with no new acute findings. ILR placed 1/24. NIHSS 4 on admission.    #Rehab of gait abnormality, dysarhtira, and expressive aphasia iso acute L corona radiata stroke  -Imaging as above  -s/p ILR 1/24  -s/p  EEG 1/22: generalized slowing, no seizures  - A1C 4.9, , TSH 2.81  -c/w ASA and plavix for 90 days  -c/w lipitor 80mg qhs  -PT/OT/SLP/Neuropsych eval and treat     #HTN  -c/w amlodipine 5mg daily  -c/w coreg 3.125mg q12h  -Monitor BP     #HLD  -c/w lipitor 80mg qhs    #DM2  - A1C with Estimated Average Glucose Result: 4.9: Method: Immunoassay - well controlled    #Hx of seizures  -c/w keppra 500 bid    #Hx of Parkinson’s  #Hx of dementia. Mild and does not interfere with patient's ability to participate in and benefit from acute rehab.  -c/w home meds sinemet, entacapone, and rivastigmine     #Anxiety/depression  #Insomnia  -c/w mirtazapine 7.5 daily  -c/w trazadone 100mg qhs  -c/w melatonin 3mg qhs prn       -Pain control: tylenol prn    -GI/Bowel Mgmt: senna/miralax    -Bladder management:      -Skin:  No active issues at this time    -FEN     - Diet: Easy to chew with thins DASH/carb consistent           Precautions / PROPHYLAXIS:      - Falls      - DVT prophylaxis: Lovenox     74M PMH of Parkinson's, HTN, hx ischemic CVA x2, type 2 DM, dementia and chronic back pain, Hx of seizures on keppra BIBA from [place] facility  or generalized weakness, confusion, decreased PO intake and multiple witnessed falls over the past few days. He had two witnessed falls the day prior to admission where he was weak and slumped to the ground. No LOC, not on AC. Per staff, he is normally alert and oriented x 3 and able to perform ADLs, but for the past few days has been increasingly confused. He was evaluated in the ED the day prior for similar complaints, had a negative work up and was discharged. Infectious and trauma workup negative. On 1/21 patient was notes to have expressive aphasia and R facial droop. Evaluated by neurology, MRI ordered and showed acute stroke in the L corona radiata. CTA Head/Neck showed severe stenosis left MCA proximal M2. Moderate narrowing right proximal vertebral artery On 1/22 new onset L tongue deviation and mild dysarthria - repeat MRI brain with no new acute findings. ILR placed 1/24. NIHSS 4 on admission.    #Rehab of gait abnormality, dysarhtira, and expressive aphasia iso acute L corona radiata stroke  -Imaging as above  -s/p ILR 1/24  -s/p  EEG 1/22: generalized slowing, no seizures  - A1C 4.9, , TSH 2.81  -c/w ASA and plavix for 90 days  -c/w lipitor 80mg qhs  -PT/OT/SLP/Neuropsych eval and treat     #HTN  -c/w amlodipine 5mg daily  -c/w coreg 3.125mg q12h  -Monitor BP     #HLD  -c/w lipitor 80mg qhs    #DM2  - A1C with Estimated Average Glucose Result: 4.9: Method: Immunoassay - well controlled    #Hx of seizures  -c/w keppra 500 bid    #Hx of Parkinson’s  #Hx of dementia. Mild and does not interfere with patient's ability to participate in and benefit from acute rehab.  -c/w home meds sinemet, entacapone, and rivastigmine     #Anxiety/depression  #Insomnia  -c/w mirtazapine 7.5 daily  -c/w trazadone 100mg qhs  -c/w melatonin 3mg qhs prn       -Pain control: tylenol prn    -GI/Bowel Mgmt: senna/miralax    -Bladder management:      -Skin:  No active issues at this time    -FEN     - Diet: Easy to chew with thins DASH/carb consistent           Precautions / PROPHYLAXIS:      - Falls      - DVT prophylaxis: Lovenox     74M PMH of Parkinson's, HTN, hx ischemic CVA x2, type 2 DM, dementia and chronic back pain, Hx of seizures on keppra BIBA from [place] facility  or generalized weakness, confusion, decreased PO intake and multiple witnessed falls over the past few days. He had two witnessed falls the day prior to admission where he was weak and slumped to the ground. No LOC, not on AC. Per staff, he is normally alert and oriented x 3 and able to perform ADLs, but for the past few days has been increasingly confused. He was evaluated in the ED the day prior for similar complaints, had a negative work up and was discharged. Infectious and trauma workup negative. On 1/21 patient was notes to have expressive aphasia and R facial droop. Evaluated by neurology, MRI ordered and showed acute stroke in the L corona radiata. CTA Head/Neck showed severe stenosis left MCA proximal M2. Moderate narrowing right proximal vertebral artery On 1/22 new onset L tongue deviation and mild dysarthria - repeat MRI brain with no new acute findings. ILR placed 1/24. NIHSS 4 on admission.    #Rehab of gait abnormality, dysarthria and expressive aphasia iso acute L corona radiata stroke  -Imaging as above  -s/p ILR 1/24  -s/p  EEG 1/22: generalized slowing, no seizures  - A1C 4.9, , TSH 2.81  -c/w ASA and plavix for 90 days  -c/w lipitor 80mg qhs  -PT/OT/SLP/Neuropsych eval and treat     #HTN  -c/w amlodipine 5mg daily  -c/w coreg 3.125mg q12h  -Monitor BP     #HLD  -c/w lipitor 80mg qhs    #DM2  - A1C with Estimated Average Glucose Result: 4.9: Method: Immunoassay - well controlled    #Hx of seizures  -c/w keppra 500 bid    #Hx of Parkinson’s  #Hx of dementia. Mild and does not interfere with patient's ability to participate in and benefit from acute rehab.  -c/w home meds sinemet, entacapone, and rivastigmine     #Anxiety/depression  #Insomnia  -c/w mirtazapine 7.5 daily  -c/w trazadone 100mg qhs  -c/w melatonin 3mg qhs prn       -Pain control: tylenol prn    -GI/Bowel Mgmt: senna/miralax    -Bladder management:      -Skin:  No active issues at this time    -FEN     - Diet: Easy to chew with thins DASH/carb consistent           Precautions / PROPHYLAXIS:      - Falls      - DVT prophylaxis: Lovenox

## 2025-01-26 NOTE — PROGRESS NOTE ADULT - ATTENDING COMMENTS
Patient seen and examined with the resident. We discussed the case. I have directed the care. I edited the note. The patient requires acute rehab with 3 hours of daily therapies at least 5 out of 7 days and close physiatry follow up.  #Rehab of gait abnormality, dysarthria and expressive aphasia iso acute L corona radiata stroke  -Imaging as above  -s/p ILR 1/24  -s/p  EEG 1/22: generalized slowing, no seizures  - A1C 4.9, , TSH 2.81  -c/w ASA and plavix for 90 days  -c/w lipitor 80mg qhs  -PT/OT/SLP/Neuropsych eval and treat     #HTN  -c/w amlodipine 5mg daily  -c/w coreg 3.125mg q12h  -Monitor BP     #HLD  -c/w lipitor 80mg qhs    #DM2  - A1C with Estimated Average Glucose Result: 4.9: Method: Immunoassay - well controlled    #Hx of seizures  -c/w keppra 500 bid    #Hx of Parkinson’s  #Hx of dementia. Mild and does not interfere with patient's ability to participate in and benefit from acute rehab.  -c/w home meds sinemet, entacapone, and rivastigmine     #Anxiety/depression  #Insomnia  -c/w mirtazapine 7.5 daily  -c/w trazadone 100mg qhs  -c/w melatonin 3mg qhs prn       -Pain control: tylenol prn    -GI/Bowel Mgmt: senna/miralax    -Bladder management:      -Skin:  No active issues at this time    -FEN     - Diet: Easy to chew with thins DASH/carb consistent

## 2025-01-27 LAB
ALBUMIN SERPL ELPH-MCNC: 3.6 G/DL — SIGNIFICANT CHANGE UP (ref 3.5–5.2)
ALP SERPL-CCNC: 87 U/L — SIGNIFICANT CHANGE UP (ref 30–115)
ALT FLD-CCNC: <5 U/L — SIGNIFICANT CHANGE UP (ref 0–41)
ANION GAP SERPL CALC-SCNC: 10 MMOL/L — SIGNIFICANT CHANGE UP (ref 7–14)
AST SERPL-CCNC: 15 U/L — SIGNIFICANT CHANGE UP (ref 0–41)
BASOPHILS # BLD AUTO: 0.03 K/UL — SIGNIFICANT CHANGE UP (ref 0–0.2)
BASOPHILS NFR BLD AUTO: 0.3 % — SIGNIFICANT CHANGE UP (ref 0–1)
BILIRUB SERPL-MCNC: 0.9 MG/DL — SIGNIFICANT CHANGE UP (ref 0.2–1.2)
BUN SERPL-MCNC: 19 MG/DL — SIGNIFICANT CHANGE UP (ref 10–20)
CALCIUM SERPL-MCNC: 8.6 MG/DL — SIGNIFICANT CHANGE UP (ref 8.4–10.5)
CHLORIDE SERPL-SCNC: 104 MMOL/L — SIGNIFICANT CHANGE UP (ref 98–110)
CO2 SERPL-SCNC: 25 MMOL/L — SIGNIFICANT CHANGE UP (ref 17–32)
CREAT SERPL-MCNC: 1 MG/DL — SIGNIFICANT CHANGE UP (ref 0.7–1.5)
EGFR: 80 ML/MIN/1.73M2 — SIGNIFICANT CHANGE UP
EOSINOPHIL # BLD AUTO: 0.1 K/UL — SIGNIFICANT CHANGE UP (ref 0–0.7)
EOSINOPHIL NFR BLD AUTO: 1.1 % — SIGNIFICANT CHANGE UP (ref 0–8)
GLUCOSE BLDC GLUCOMTR-MCNC: 107 MG/DL — HIGH (ref 70–99)
GLUCOSE BLDC GLUCOMTR-MCNC: 117 MG/DL — HIGH (ref 70–99)
GLUCOSE BLDC GLUCOMTR-MCNC: 171 MG/DL — HIGH (ref 70–99)
GLUCOSE BLDC GLUCOMTR-MCNC: 72 MG/DL — SIGNIFICANT CHANGE UP (ref 70–99)
GLUCOSE SERPL-MCNC: 70 MG/DL — SIGNIFICANT CHANGE UP (ref 70–99)
HCT VFR BLD CALC: 46.3 % — SIGNIFICANT CHANGE UP (ref 42–52)
HGB BLD-MCNC: 15.9 G/DL — SIGNIFICANT CHANGE UP (ref 14–18)
IMM GRANULOCYTES NFR BLD AUTO: 0.3 % — SIGNIFICANT CHANGE UP (ref 0.1–0.3)
LYMPHOCYTES # BLD AUTO: 2.27 K/UL — SIGNIFICANT CHANGE UP (ref 1.2–3.4)
LYMPHOCYTES # BLD AUTO: 25.3 % — SIGNIFICANT CHANGE UP (ref 20.5–51.1)
MAGNESIUM SERPL-MCNC: 2 MG/DL — SIGNIFICANT CHANGE UP (ref 1.8–2.4)
MCHC RBC-ENTMCNC: 33.8 PG — HIGH (ref 27–31)
MCHC RBC-ENTMCNC: 34.3 G/DL — SIGNIFICANT CHANGE UP (ref 32–37)
MCV RBC AUTO: 98.5 FL — HIGH (ref 80–94)
MONOCYTES # BLD AUTO: 0.76 K/UL — HIGH (ref 0.1–0.6)
MONOCYTES NFR BLD AUTO: 8.5 % — SIGNIFICANT CHANGE UP (ref 1.7–9.3)
NEUTROPHILS # BLD AUTO: 5.78 K/UL — SIGNIFICANT CHANGE UP (ref 1.4–6.5)
NEUTROPHILS NFR BLD AUTO: 64.5 % — SIGNIFICANT CHANGE UP (ref 42.2–75.2)
NRBC # BLD: 0 /100 WBCS — SIGNIFICANT CHANGE UP (ref 0–0)
NRBC BLD-RTO: 0 /100 WBCS — SIGNIFICANT CHANGE UP (ref 0–0)
PLATELET # BLD AUTO: 151 K/UL — SIGNIFICANT CHANGE UP (ref 130–400)
PMV BLD: 10 FL — SIGNIFICANT CHANGE UP (ref 7.4–10.4)
POTASSIUM SERPL-MCNC: 4.2 MMOL/L — SIGNIFICANT CHANGE UP (ref 3.5–5)
POTASSIUM SERPL-SCNC: 4.2 MMOL/L — SIGNIFICANT CHANGE UP (ref 3.5–5)
PROT SERPL-MCNC: 5.8 G/DL — LOW (ref 6–8)
RBC # BLD: 4.7 M/UL — SIGNIFICANT CHANGE UP (ref 4.7–6.1)
RBC # FLD: 11.5 % — SIGNIFICANT CHANGE UP (ref 11.5–14.5)
SODIUM SERPL-SCNC: 139 MMOL/L — SIGNIFICANT CHANGE UP (ref 135–146)
WBC # BLD: 8.97 K/UL — SIGNIFICANT CHANGE UP (ref 4.8–10.8)
WBC # FLD AUTO: 8.97 K/UL — SIGNIFICANT CHANGE UP (ref 4.8–10.8)

## 2025-01-27 RX ADMIN — Medication 1 TABLET(S): at 17:48

## 2025-01-27 RX ADMIN — Medication 100 MILLIGRAM(S): at 21:55

## 2025-01-27 RX ADMIN — LEVETIRACETAM 500 MILLIGRAM(S): 750 TABLET, FILM COATED ORAL at 06:46

## 2025-01-27 RX ADMIN — ENTACAPONE 200 MILLIGRAM(S): 200 TABLET, FILM COATED ORAL at 21:55

## 2025-01-27 RX ADMIN — ENTACAPONE 200 MILLIGRAM(S): 200 TABLET, FILM COATED ORAL at 14:43

## 2025-01-27 RX ADMIN — ENOXAPARIN SODIUM 40 MILLIGRAM(S): 100 INJECTION SUBCUTANEOUS at 06:50

## 2025-01-27 RX ADMIN — MIRTAZAPINE 7.5 MILLIGRAM(S): 30 TABLET, FILM COATED ORAL at 12:08

## 2025-01-27 RX ADMIN — GABAPENTIN 100 MILLIGRAM(S): 800 TABLET ORAL at 21:55

## 2025-01-27 RX ADMIN — Medication 3.12 MILLIGRAM(S): at 17:20

## 2025-01-27 RX ADMIN — ANTISEPTIC SURGICAL SCRUB 1 APPLICATION(S): 0.04 SOLUTION TOPICAL at 06:50

## 2025-01-27 RX ADMIN — ASPIRIN 81 MILLIGRAM(S): 81 TABLET, COATED ORAL at 12:07

## 2025-01-27 RX ADMIN — Medication 2 TABLET(S): at 06:46

## 2025-01-27 RX ADMIN — Medication 2 TABLET(S): at 21:54

## 2025-01-27 RX ADMIN — Medication 2 TABLET(S): at 14:42

## 2025-01-27 RX ADMIN — Medication 2 TABLET(S): at 21:55

## 2025-01-27 RX ADMIN — Medication 1: at 17:15

## 2025-01-27 RX ADMIN — Medication 75 MILLIGRAM(S): at 12:07

## 2025-01-27 RX ADMIN — ATORVASTATIN CALCIUM 80 MILLIGRAM(S): 80 TABLET, FILM COATED ORAL at 21:55

## 2025-01-27 RX ADMIN — ENTACAPONE 200 MILLIGRAM(S): 200 TABLET, FILM COATED ORAL at 06:46

## 2025-01-27 RX ADMIN — LEVETIRACETAM 500 MILLIGRAM(S): 750 TABLET, FILM COATED ORAL at 17:20

## 2025-01-27 RX ADMIN — POLYETHYLENE GLYCOL 3350 17 GRAM(S): 17 POWDER, FOR SOLUTION ORAL at 12:08

## 2025-01-27 RX ADMIN — RIVASTIGMINE 1 PATCH: 4.6 PATCH, EXTENDED RELEASE TRANSDERMAL at 06:45

## 2025-01-27 RX ADMIN — Medication 5 MILLIGRAM(S): at 06:46

## 2025-01-27 RX ADMIN — Medication 3.12 MILLIGRAM(S): at 06:47

## 2025-01-27 NOTE — PROGRESS NOTE ADULT - ASSESSMENT
Neuropsychology Follow Up      Treatment focused on: Mood Check-In     Pain: No     Location: N/a     Orientation: WFL     Arousal Level: Alert     Behavior: Cooperative     Affect/Mood Range: Sadness      Needed: No   #: N/A     Attention: L     Insight into illness/deficits: [facility]          Treatment Session Focused on Mood Evaluation:     Patient was seen for a supportive therapy session following reports of a decrease in mood secondary to an acute stroke and Parkinson’s disease.          Patient was seen at bedside today and denied pain. The patient endorsed feelings of sadness and anxiousness related to adjusting to his recent stroke. 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 Sleep continues to fluctuate, as he reported getting 5 hours.           Positive support and mindfulness exercises were reviewed to help reduce symptoms and improve sleep. Methods included the following:     Creating SMART goals (e.g., increasing the number of times he performs physical therapy exercises to strengthen his body).     Mindfulness exercises (e.g., listening to calming music).      Narrative Therapy (e.g., help patient create new, positive stories about his life so he can feel more empowered and in control)          Clinical Impression for Acute Rehab Recovery Setting:     ?Patient presents with symptoms of depression, characterized by lack of motivation and sadness attributed to his medical state.      Patient demonstrates keen insight. Will continue to monitor mood and provide therapeutic interventions to subside depressive symptoms (e.g., Motivational Interviewing). Clinician will provide therapeutic resources to loved ones and additional supportive strategies to implement with the patient.           Recommendations for Treatment Team:     Mood Monitoring and Psychosocial Support: Given the patient’s reported mood concerns, periodic mood monitoring is crucial. Incorporate psychosocial support as needed to help with adjustment and emotional regulation. When a therapist is working with the patient, it would be helpful to provide frequent praise and validate the difficulty the patient may experience, given the nature of his stroke. Encourage the family to involve him in activities that improve mood, such as light exposure and social interactions, to support emotional well-being.     Caregiver Involvement: Engage the patient’s family in his rehabilitation plan, especially given their observations regarding his mood. Educate them on the importance of consistent routine, positive reinforcement, and patience when assisting with his cognitive and physical rehabilitation.           Goals: Facilitate Positive Coping, Manage Depression and Anxiety, Facilitate Positive Family Coping           Plan: 1-3 times a week 30-60 minutes

## 2025-01-27 NOTE — PROGRESS NOTE ADULT - ASSESSMENT
74M PMH of Parkinson's, HTN, hx ischemic CVA x2, type 2 DM, dementia and chronic back pain, Hx of seizures on keppra BIBA from [place] facility  or generalized weakness, confusion, decreased PO intake and multiple witnessed falls over the past few days. He had two witnessed falls the day prior to admission where he was weak and slumped to the ground. No LOC, not on AC. Per staff, he is normally alert and oriented x 3 and able to perform ADLs, but for the past few days has been increasingly confused. He was evaluated in the ED the day prior for similar complaints, had a negative work up and was discharged. Infectious and trauma workup negative. On 1/21 patient was notes to have expressive aphasia and R facial droop. Evaluated by neurology, MRI ordered and showed acute stroke in the L corona radiata. CTA Head/Neck showed severe stenosis left MCA proximal M2. Moderate narrowing right proximal vertebral artery On 1/22 new onset L tongue deviation and mild dysarthria - repeat MRI brain with no new acute findings. ILR placed 1/24. NIHSS 4 on admission.    #Rehab of gait abnormality, dysarthria and expressive aphasia iso acute L corona radiata stroke  -Imaging as above  -s/p ILR 1/24  -s/p  EEG 1/22: generalized slowing, no seizures  - A1C 4.9, , TSH 2.81  -c/w ASA and plavix for 90 days  -c/w lipitor 80mg qhs  -PT/OT/SLP/Neuropsych eval and treat     #HTN  -c/w amlodipine 5mg daily  -c/w coreg 3.125mg q12h  -Monitor BP     #HLD  -c/w lipitor 80mg qhs    #DM2  - A1C with Estimated Average Glucose Result: 4.9: Method: Immunoassay - well controlled    #Hx of seizures  -c/w keppra 500 bid    #Hx of Parkinson’s  #Hx of dementia. Mild and does not interfere with patient's ability to participate in and benefit from acute rehab.  -c/w home meds sinemet, entacapone, and rivastigmine     #Anxiety/depression  #Insomnia  -c/w mirtazapine 7.5 daily  -c/w trazadone 100mg qhs  -c/w melatonin 3mg qhs prn       -Pain control: tylenol prn    -GI/Bowel Mgmt: senna/miralax    -Bladder management:      -Skin:  No active issues at this time    -FEN     - Diet: Easy to chew with thins DASH/carb consistent           Precautions / PROPHYLAXIS:      - Falls      - DVT prophylaxis: Lovenox

## 2025-01-27 NOTE — PROGRESS NOTE ADULT - SUBJECTIVE AND OBJECTIVE BOX
Patient is a 74y old  Male who presents with a chief complaint of acute stroke and Parkinson's Syndrome with decline in function (24 Jan 2025 13:28)      HPI:    74M PMH of Parkinson's, HTN, hx ischemic CVA x2, type 2 DM, dementia and chronic back pain, Hx of seizures on keppra BIBA from [place] facility  or generalized weakness, confusion, decreased PO intake and multiple witnessed falls over the past few days. He had two witnessed falls the day prior to admission where he was weak and slumped to the ground. No LOC, not on AC. Per staff, he is normally alert and oriented x 3 and able to perform ADLs, but for the past few days has been increasingly confused. He was evaluated in the ED the day prior for similar complaints, had a negative work up and was discharged. Infectious and trauma workup negative. On 1/21 patient was notes to have expressive aphasia and R facial droop. Evaluated by neurology, MRI ordered and showed acute stroke in the L corona radiata. CTA Head/Neck showed severe stenosis left MCA proximal M2. Moderate narrowing right proximal vertebral arter On 1/22 new onset L tongue deviation and mild dysarthria - repeat MRI brain with no new acute findings. ILR placed 1/24.    Prior to admission, the patient was independent in ADLs and ambulation without an assistive device Patient now requires Reza for bed mobility, modA for transfers, ambulating 40’x2 with RW and modA,  modA for LBD. Therefore, there is a significant functional decline from baseline. Patients also with medical comorbidities of DM, HTN, requiring medication management and monitoring of vitals by Physiatry team and nursing. To return home it is in the patient’s best interest to have acute inpatient rehabilitative services to undergo intensive interdisciplinary therapy. Furthermore, the patient is motivated and is able to tolerate up to 3 hours of daily therapy for 5-6 days a week for a total of 15 hours a week. Evaluated by Physiatry and deemed to be an excellent candidate for admission to  for acute inpatient rehab. Admitted to Acute Rehab on 1/24/25    LS: Lives alone in a private home, 6STE  PLOF: independent in ADLs and ambulation with no AS     (24 Jan 2025 13:28)      I examined the patient and reviewed the chart. There have been no significant changes since my history and physical except where documented below.    TODAY'S SUBJECTIVE & REVIEW OF SYMPTOMS:    No acute overnight events. [facility].  Seen at bedside today. Notes his mood is depressed and is hoping he can recover from his stroke.   No acute complaints. Vitals and labs reviewed Bowel and bladder continent . Sleeping well.  Tolerating therapy.      Constitutional:    [   ] WNL           [   ] poor appetite   [   ] insomnia   [   ] tired [ x] weak   Cardio:                [ x  ] WNL           [   ] CP   [   ] SY   [   ] palpitations               Resp:                   [ x  ] WNL           [   ] SOB   [   ] cough   [   ] wheezing   GI:                        [  x ] WNL           [   ] constipation   [   ] diarrhea   [   ] abdominal pain   [   ] nausea   [   ] emesis                                :                      [   x] WNL           [   ] LAM  [   ] dysuria   [   ] difficulty voiding             Endo:                   [  x ] WNL          [   ] polyuria   [   ] temperature intolerance                 Skin:                     [  x ] WNL          [   ] pain   [   ] wound   [   ] rash   MSK:                    [  x ] WNL          [   ] muscle pain   [   ] joint pain/ stiffness   [   ] muscle tenderness   [   ] swelling   Neuro:                 [ x  ] WNL          [   ] HA   [   ] change in vision   [   ] tremor   [   ] weakness   [   ]dysphagia              Cognitive:           [   ] WNL           [ x  ]confusion at times   Psych:                  [   ] WNL           [   ] hallucinations   [   ]agitation   [   ] delusion   [ x  ]depression/ anxiety    PHYSICAL EXAM    Vital Signs Last 24 Hrs  T(C): 36.4 (27 Jan 2025 06:22), Max: 36.7 (26 Jan 2025 20:06)  T(F): 97.6 (27 Jan 2025 06:22), Max: 98.1 (26 Jan 2025 20:06)  HR: 64 (27 Jan 2025 06:22) (64 - 94)  BP: 120/75 (27 Jan 2025 06:22) (120/75 - 134/86)  BP(mean): 90 (27 Jan 2025 06:22) (90 - 90)  RR: 19 (27 Jan 2025 06:22) (18 - 19)  SpO2: 98% (26 Jan 2025 13:53) (98% - 98%)    Parameters below as of 26 Jan 2025 13:53  Patient On (Oxygen Delivery Method): room air      General:[x   ] NAD, Resting Comfortable,   [   ] other:                                HEENT: [ x  ] NC/AT, EOMI, PERRL , Normal Conjunctivae,   [   ] other:  Cardio: [  x ] RRR, no murmer,   [   ] other:                              Pulm: [  x ] No Respiratory Distress,  Lungs CTAB,   [   ] other:                       Abdomen: [x   ]ND/NT, Soft,   [   ] other:    : [ x  ] NO LAM CATHETER, [   ] LAM CATHETER- no meatal tear, no discharge, [   ] other:                                            MSK: [x   ] No joint swelling, Full ROM,   [   ] other:                                         Ext: [  x ]No C/C/E, No calf tenderness,   [   ]other:    Skin: [ x  ]intact,   [   ] other:                                                                   Neurological Examination:  Cognitive: [    ] AAO x 3,   [ x   ]  other: AOx2 - not space,                                                                      Attention:  [  x  ] intact,   [  ]  other:                         Memory: [    ] intact,    [ x   ]  other:   2/3 delayed recall with verbal cues  Mood/Affect: [  x  ] wnl,    [    ]  other:                                                                             Communication: [    ]Fluent, no dysarthria, following commands:  [   x ] other: mild dysarthria, expressive aphasia, mild anomia, hypophonia   CN II - XII:  [    ] intact,  [ x   ] other: R facial droop                                                                                        Motor:   RIGHT UE: [  x ] WNL,  [   ] other:  LEFT    UE: [  x ] WNL,  [   ] other:  RIGHT LE: [ x  ] WNL,  [   ] other:   LEFT    LE: [x   ] WNL,  [   ] other:    Tone: [  x ] wnl,   [    ]  other:  DTRs: [   ]symmetric, [ x  ] other: R patellar reflex brisk 3+  Coordination:   [x    ] intact,   [    ] other:                                                                           Sensory: [   x ] Intact to light touch,   [    ] other:    MEDICATIONS  (STANDING):  amLODIPine   Tablet 5 milliGRAM(s) Oral daily  aspirin  chewable 81 milliGRAM(s) Oral daily  atorvastatin 80 milliGRAM(s) Oral at bedtime  carbidopa/levodopa  25/100 2 Tablet(s) Oral three times a day  carbidopa/levodopa CR 50/200 1 Tablet(s) Oral <User Schedule>  carvedilol 3.125 milliGRAM(s) Oral every 12 hours  chlorhexidine 2% Cloths 1 Application(s) Topical <User Schedule>  clopidogrel Tablet 75 milliGRAM(s) Oral daily  dextrose 5%. 1000 milliLiter(s) (50 mL/Hr) IV Continuous <Continuous>  dextrose 5%. 1000 milliLiter(s) (100 mL/Hr) IV Continuous <Continuous>  dextrose 50% Injectable 25 Gram(s) IV Push once  enoxaparin Injectable 40 milliGRAM(s) SubCutaneous every 24 hours  entacapone 200 milliGRAM(s) Oral three times a day  gabapentin 100 milliGRAM(s) Oral at bedtime  glucagon  Injectable 1 milliGRAM(s) IntraMuscular once  insulin lispro (ADMELOG) corrective regimen sliding scale   SubCutaneous three times a day before meals  levETIRAcetam 500 milliGRAM(s) Oral two times a day  mirtazapine 7.5 milliGRAM(s) Oral daily  polyethylene glycol 3350 17 Gram(s) Oral daily  rivastigmine patch  9.5 mG/24 Hr(s). 1 Patch Transdermal every 24 hours  senna 2 Tablet(s) Oral at bedtime  traZODone 100 milliGRAM(s) Oral at bedtime    MEDICATIONS  (PRN):  acetaminophen     Tablet .. 650 milliGRAM(s) Oral every 6 hours PRN Temp greater or equal to 38C (100.4F), Mild Pain (1 - 3)  aluminum hydroxide/magnesium hydroxide/simethicone Suspension 30 milliLiter(s) Oral every 4 hours PRN Dyspepsia  dextrose Oral Gel 15 Gram(s) Oral once PRN Blood Glucose LESS THAN 70 milliGRAM(s)/deciliter  magnesium hydroxide Suspension 30 milliLiter(s) Oral daily PRN Constipation  melatonin 3 milliGRAM(s) Oral at bedtime PRN Insomnia    RECENT LABS/IMAGING                          15.9   8.97  )-----------( 151      ( 27 Jan 2025 05:49 )             46.3     01-27    139  |  104  |  19  ----------------------------<  70  4.2   |  25  |  1.0    Ca    8.6      27 Jan 2025 05:49  Mg     2.0     01-27    TPro  5.8[L]  /  Alb  3.6  /  TBili  0.9  /  DBili  x   /  AST  15  /  ALT  <5  /  AlkPhos  87  01-27      Urinalysis Basic - ( 27 Jan 2025 05:49 )    Color: x / Appearance: x / SG: x / pH: x  Gluc: 70 mg/dL / Ketone: x  / Bili: x / Urobili: x   Blood: x / Protein: x / Nitrite: x   Leuk Esterase: x / RBC: x / WBC x   Sq Epi: x / Non Sq Epi: x / Bacteria: x      POCT Blood Glucose.: 107 mg/dL (01-27-25 @ 11:52)  POCT Blood Glucose.: 72 mg/dL (01-27-25 @ 07:42)  POCT Blood Glucose.: 103 mg/dL (01-26-25 @ 20:34)  POCT Blood Glucose.: 116 mg/dL (01-26-25 @ 16:49)  POCT Blood Glucose.: 118 mg/dL (01-26-25 @ 11:52)  POCT Blood Glucose.: 90 mg/dL (01-26-25 @ 07:31)  POCT Blood Glucose.: 122 mg/dL (01-25-25 @ 21:09)  POCT Blood Glucose.: 105 mg/dL (01-25-25 @ 16:27)  POCT Blood Glucose.: 206 mg/dL (01-25-25 @ 11:39)  POCT Blood Glucose.: 82 mg/dL (01-25-25 @ 07:53)  POCT Blood Glucose.: 94 mg/dL (01-24-25 @ 21:20)  POCT Blood Glucose.: 104 mg/dL (01-24-25 @ 16:37)                          16.4   8.41  )-----------( 164      ( 25 Jan 2025 07:36 )             46.5     01-25    141  |  105  |  12  ----------------------------<  70  3.9   |  23  |  0.7    Ca    8.5      25 Jan 2025 07:36  Phos  2.7     01-24  Mg     1.8     01-25    TPro  5.8[L]  /  Alb  3.7  /  TBili  1.3[H]  /  DBili  x   /  AST  12  /  ALT  <5  /  AlkPhos  86  01-25      Urinalysis Basic - ( 25 Jan 2025 07:36 )    Color: x / Appearance: x / SG: x / pH: x  Gluc: 70 mg/dL / Ketone: x  / Bili: x / Urobili: x   Blood: x / Protein: x / Nitrite: x   Leuk Esterase: x / RBC: x / WBC x   Sq Epi: x / Non Sq Epi: x / Bacteria: x   Patient is a 74y old  Male who presents with a chief complaint of acute stroke and Parkinson's Syndrome with decline in function (24 Jan 2025 13:28)      HPI:    74M PMH of Parkinson's, HTN, hx ischemic CVA x2, type 2 DM, dementia and chronic back pain, Hx of seizures on keppra BIBA from [facility] facility  or generalized weakness, confusion, decreased PO intake and multiple witnessed falls over the past few days. He had two witnessed falls the day prior to admission where he was weak and slumped to the ground. No LOC, not on AC. Per staff, he is normally alert and oriented x 3 and able to perform ADLs, but for the past few days has been increasingly confused. He was evaluated in the ED the day prior for similar complaints, had a negative work up and was discharged. Infectious and trauma workup negative. On 1/21 patient was notes to have expressive aphasia and R facial droop. Evaluated by neurology, MRI ordered and showed acute stroke in the L corona radiata. CTA Head/Neck showed severe stenosis left MCA proximal M2. Moderate narrowing right proximal vertebral arter On 1/22 new onset L tongue deviation and mild dysarthria - repeat MRI brain with no new acute findings. ILR placed 1/24.    Prior to admission, the patient was independent in ADLs and ambulation without an assistive device Patient now requires Reza for bed mobility, modA for transfers, ambulating 40’x2 with RW and modA,  modA for LBD. Therefore, there is a significant functional decline from baseline. Patients also with medical comorbidities of DM, HTN, requiring medication management and monitoring of vitals by Physiatry team and nursing. To return home it is in the patient’s best interest to have acute inpatient rehabilitative services to undergo intensive interdisciplinary therapy. Furthermore, the patient is motivated and is able to tolerate up to 3 hours of daily therapy for 5-6 days a week for a total of 15 hours a week. Evaluated by Physiatry and deemed to be an excellent candidate for admission to  for acute inpatient rehab. Admitted to Acute Rehab on 1/24/25    LS: Lives alone in a private home, 6STE  PLOF: independent in ADLs and ambulation with no AS     (24 Jan 2025 13:28)    TODAY'S SUBJECTIVE & REVIEW OF SYMPTOMS:    No acute overnight events. [facility].  Seen at bedside today. Feeling off cognitively Notes his mood is depressed and is hoping he can recover from his stroke.   No acute complaints. Vitals and labs reviewed Bowel and bladder continent . Sleeping well.  Tolerating therapy.      Constitutional:    [   ] WNL           [   ] poor appetite   [   ] insomnia   [   ] tired [ x] weak   Cardio:                [ x  ] WNL           [   ] CP   [   ] SY   [   ] palpitations               Resp:                   [ x  ] WNL           [   ] SOB   [   ] cough   [   ] wheezing   GI:                        [  x ] WNL           [   ] constipation   [   ] diarrhea   [   ] abdominal pain   [   ] nausea   [   ] emesis                                :                      [   x] WNL           [   ] LAM  [   ] dysuria   [   ] difficulty voiding             Endo:                   [  x ] WNL          [   ] polyuria   [   ] temperature intolerance                 Skin:                     [  x ] WNL          [   ] pain   [   ] wound   [   ] rash   MSK:                    [  x ] WNL          [   ] muscle pain   [   ] joint pain/ stiffness   [   ] muscle tenderness   [   ] swelling   Neuro:                 [ x  ] WNL          [   ] HA   [   ] change in vision   [   ] tremor   [   ] weakness   [   ]dysphagia              Cognitive:           [   ] WNL           [ x  ]confusion at times   Psych:                  [   ] WNL           [   ] hallucinations   [   ]agitation   [   ] delusion   [ x  ]depression/ anxiety    PHYSICAL EXAM    Vital Signs Last 24 Hrs  T(C): 36.4 (27 Jan 2025 06:22), Max: 36.7 (26 Jan 2025 20:06)  T(F): 97.6 (27 Jan 2025 06:22), Max: 98.1 (26 Jan 2025 20:06)  HR: 64 (27 Jan 2025 06:22) (64 - 94)  BP: 120/75 (27 Jan 2025 06:22) (120/75 - 134/86)  BP(mean): 90 (27 Jan 2025 06:22) (90 - 90)  RR: 19 (27 Jan 2025 06:22) (18 - 19)  SpO2: 98% (26 Jan 2025 13:53) (98% - 98%)    Parameters below as of 26 Jan 2025 13:53  Patient On (Oxygen Delivery Method): room air      General:[x   ] NAD, Resting Comfortable,   [   ] other:                                HEENT: [ x  ] NC/AT, EOMI, PERRL , Normal Conjunctivae,   [   ] other:  Cardio: [  x ] RRR, no murmur   [   ] other:                              Pulm: [  x ] No Respiratory Distress,  Lungs CTAB,   [   ] other:                       Abdomen: [x   ]ND/NT, Soft,   [   ] other:    : [ x  ] NO LAM CATHETER, [   ] LAM CATHETER- no meatal tear, no discharge, [   ] other:                                            MSK: [x   ] No joint swelling, Full ROM,   [   ] other:                                         Ext: [  x ]No C/C/E, No calf tenderness,   [   ]other:    Skin: [ x  ]intact,   [   ] other:                                                                   Neurological Examination:  Cognitive: [    ] AAO x 3,   [ x   ]  other: AOx2 - not place                                                                     Attention:  [  x  ] intact,   [  ]  other:                         Memory: [    ] intact,    [ x   ]  other:   2/3 delayed recall with verbal cues  Mood/Affect: [  x  ] wnl,    [    ]  other:                                                                             Communication: [    ]Fluent, no dysarthria, following commands:  [   x ] other: mild dysarthria, expressive aphasia, mild anomia, hypophonia   CN II - XII:  [    ] intact,  [ x   ] other: R facial droop                                                                                        Motor:   RIGHT UE: [  x ] WNL,  [   ] other:  LEFT    UE: [  x ] WNL,  [   ] other:  RIGHT LE: [ x  ] WNL,  [   ] other:   LEFT    LE: [x   ] WNL,  [   ] other:    Tone: [  x ] wnl,   [    ]  other:  DTRs: [   ]symmetric, [ x  ] other: R patellar reflex brisk 3+  Coordination:   [x    ] intact,   [    ] other:                                                                           Sensory: [   x ] Intact to light touch,   [    ] other:    MEDICATIONS  (STANDING):  amLODIPine   Tablet 5 milliGRAM(s) Oral daily  aspirin  chewable 81 milliGRAM(s) Oral daily  atorvastatin 80 milliGRAM(s) Oral at bedtime  carbidopa/levodopa  25/100 2 Tablet(s) Oral three times a day  carbidopa/levodopa CR 50/200 1 Tablet(s) Oral <User Schedule>  carvedilol 3.125 milliGRAM(s) Oral every 12 hours  chlorhexidine 2% Cloths 1 Application(s) Topical <User Schedule>  clopidogrel Tablet 75 milliGRAM(s) Oral daily  dextrose 5%. 1000 milliLiter(s) (50 mL/Hr) IV Continuous <Continuous>  dextrose 5%. 1000 milliLiter(s) (100 mL/Hr) IV Continuous <Continuous>  dextrose 50% Injectable 25 Gram(s) IV Push once  enoxaparin Injectable 40 milliGRAM(s) SubCutaneous every 24 hours  entacapone 200 milliGRAM(s) Oral three times a day  gabapentin 100 milliGRAM(s) Oral at bedtime  glucagon  Injectable 1 milliGRAM(s) IntraMuscular once  insulin lispro (ADMELOG) corrective regimen sliding scale   SubCutaneous three times a day before meals  levETIRAcetam 500 milliGRAM(s) Oral two times a day  mirtazapine 7.5 milliGRAM(s) Oral daily  polyethylene glycol 3350 17 Gram(s) Oral daily  rivastigmine patch  9.5 mG/24 Hr(s). 1 Patch Transdermal every 24 hours  senna 2 Tablet(s) Oral at bedtime  traZODone 100 milliGRAM(s) Oral at bedtime    MEDICATIONS  (PRN):  acetaminophen     Tablet .. 650 milliGRAM(s) Oral every 6 hours PRN Temp greater or equal to 38C (100.4F), Mild Pain (1 - 3)  aluminum hydroxide/magnesium hydroxide/simethicone Suspension 30 milliLiter(s) Oral every 4 hours PRN Dyspepsia  dextrose Oral Gel 15 Gram(s) Oral once PRN Blood Glucose LESS THAN 70 milliGRAM(s)/deciliter  magnesium hydroxide Suspension 30 milliLiter(s) Oral daily PRN Constipation  melatonin 3 milliGRAM(s) Oral at bedtime PRN Insomnia    RECENT LABS/IMAGING                          15.9   8.97  )-----------( 151      ( 27 Jan 2025 05:49 )             46.3     01-27    139  |  104  |  19  ----------------------------<  70  4.2   |  25  |  1.0    Ca    8.6      27 Jan 2025 05:49  Mg     2.0     01-27    TPro  5.8[L]  /  Alb  3.6  /  TBili  0.9  /  DBili  x   /  AST  15  /  ALT  <5  /  AlkPhos  87  01-27        POCT Blood Glucose.: 107 mg/dL (01-27-25 @ 11:52)  POCT Blood Glucose.: 72 mg/dL (01-27-25 @ 07:42)  POCT Blood Glucose.: 103 mg/dL (01-26-25 @ 20:34)  POCT Blood Glucose.: 116 mg/dL (01-26-25 @ 16:49)  POCT Blood Glucose.: 118 mg/dL (01-26-25 @ 11:52)  POCT Blood Glucose.: 90 mg/dL (01-26-25 @ 07:31)  POCT Blood Glucose.: 122 mg/dL (01-25-25 @ 21:09)  POCT Blood Glucose.: 105 mg/dL (01-25-25 @ 16:27)  POCT Blood Glucose.: 206 mg/dL (01-25-25 @ 11:39)  POCT Blood Glucose.: 82 mg/dL (01-25-25 @ 07:53)  POCT Blood Glucose.: 94 mg/dL (01-24-25 @ 21:20)  POCT Blood Glucose.: 104 mg/dL (01-24-25 @ 16:37)

## 2025-01-27 NOTE — PROGRESS NOTE ADULT - ATTENDING COMMENTS
I reviewed the chart and examined the patient with the resident and we discussed the findings and treatment plan.  The patient is tolerating the rehab program well. I agree with the findings and treatment plan above, which I modified as indicated. The patient requires 3 hrs a day of acute inpatient rehab. Alert. C/o of feeling cognitively off, especially the last few days. VSS. Neuro exam stable. Labs stable. Continue full rehab program.    I read, edited and agree with the physical exam above and assessment:  #Rehab of gait abnormality, dysarthria and expressive aphasia iso acute L corona radiata stroke  -Imaging as above  -s/p ILR 1/24  -s/p  EEG 1/22: generalized slowing, no seizures  - A1C 4.9, , TSH 2.81  -c/w ASA and plavix for 90 days  -c/w lipitor 80mg qhs  -PT/OT/SLP/Neuropsych eval and treat     #HTN  -c/w amlodipine 5mg daily  -c/w coreg 3.125mg q12h  -Monitor BP     #HLD  -c/w lipitor 80mg qhs    #DM2  - A1C with Estimated Average Glucose Result: 4.9: Method: Immunoassay - well controlled    #Hx of seizures  -c/w keppra 500 bid    #Hx of Parkinson’s  #Hx of dementia. Mild and does not interfere with patient's ability to participate in and benefit from acute rehab.  -c/w home meds sinemet, entacapone, and rivastigmine     #Anxiety/depression  #Insomnia  -c/w mirtazapine 7.5 daily  -c/w trazadone 100mg qhs  -c/w melatonin 3mg qhs prn       -Pain control: tylenol prn    -GI/Bowel Mgmt: senna/miralax    -Bladder management:      -Skin:  No active issues at this time    -FEN     - Diet: Easy to chew with thins DASH/carb consistent           Precautions / PROPHYLAXIS:      - Falls      - DVT prophylaxis: Lovenox

## 2025-01-28 LAB
GLUCOSE BLDC GLUCOMTR-MCNC: 123 MG/DL — HIGH (ref 70–99)
GLUCOSE BLDC GLUCOMTR-MCNC: 127 MG/DL — HIGH (ref 70–99)
GLUCOSE BLDC GLUCOMTR-MCNC: 172 MG/DL — HIGH (ref 70–99)
GLUCOSE BLDC GLUCOMTR-MCNC: 87 MG/DL — SIGNIFICANT CHANGE UP (ref 70–99)

## 2025-01-28 RX ORDER — TRAZODONE HCL 100 MG
50 TABLET ORAL AT BEDTIME
Refills: 0 | Status: DISCONTINUED | OUTPATIENT
Start: 2025-01-28 | End: 2025-02-12

## 2025-01-28 RX ADMIN — LEVETIRACETAM 500 MILLIGRAM(S): 750 TABLET, FILM COATED ORAL at 06:34

## 2025-01-28 RX ADMIN — ASPIRIN 81 MILLIGRAM(S): 81 TABLET, COATED ORAL at 12:34

## 2025-01-28 RX ADMIN — ENTACAPONE 200 MILLIGRAM(S): 200 TABLET, FILM COATED ORAL at 21:35

## 2025-01-28 RX ADMIN — Medication 2 TABLET(S): at 06:34

## 2025-01-28 RX ADMIN — ENOXAPARIN SODIUM 40 MILLIGRAM(S): 100 INJECTION SUBCUTANEOUS at 06:34

## 2025-01-28 RX ADMIN — ENTACAPONE 200 MILLIGRAM(S): 200 TABLET, FILM COATED ORAL at 06:34

## 2025-01-28 RX ADMIN — Medication 2 TABLET(S): at 21:36

## 2025-01-28 RX ADMIN — POLYETHYLENE GLYCOL 3350 17 GRAM(S): 17 POWDER, FOR SOLUTION ORAL at 12:34

## 2025-01-28 RX ADMIN — LEVETIRACETAM 500 MILLIGRAM(S): 750 TABLET, FILM COATED ORAL at 17:32

## 2025-01-28 RX ADMIN — RIVASTIGMINE 1 PATCH: 4.6 PATCH, EXTENDED RELEASE TRANSDERMAL at 17:44

## 2025-01-28 RX ADMIN — Medication 3.12 MILLIGRAM(S): at 06:34

## 2025-01-28 RX ADMIN — ENTACAPONE 200 MILLIGRAM(S): 200 TABLET, FILM COATED ORAL at 12:34

## 2025-01-28 RX ADMIN — Medication 75 MILLIGRAM(S): at 12:34

## 2025-01-28 RX ADMIN — Medication 2 TABLET(S): at 12:34

## 2025-01-28 RX ADMIN — Medication 1 TABLET(S): at 17:33

## 2025-01-28 RX ADMIN — MIRTAZAPINE 7.5 MILLIGRAM(S): 30 TABLET, FILM COATED ORAL at 12:35

## 2025-01-28 RX ADMIN — ANTISEPTIC SURGICAL SCRUB 1 APPLICATION(S): 0.04 SOLUTION TOPICAL at 06:41

## 2025-01-28 RX ADMIN — ATORVASTATIN CALCIUM 80 MILLIGRAM(S): 80 TABLET, FILM COATED ORAL at 21:36

## 2025-01-28 RX ADMIN — Medication 5 MILLIGRAM(S): at 06:34

## 2025-01-28 RX ADMIN — RIVASTIGMINE 1 PATCH: 4.6 PATCH, EXTENDED RELEASE TRANSDERMAL at 06:34

## 2025-01-28 RX ADMIN — Medication 3.12 MILLIGRAM(S): at 17:32

## 2025-01-28 RX ADMIN — Medication 50 MILLIGRAM(S): at 21:36

## 2025-01-28 NOTE — PROGRESS NOTE ADULT - SUBJECTIVE AND OBJECTIVE BOX
Patient is a 74y old  Male who presents with a chief complaint of acute stroke and Parkinson's Syndrome with decline in function (24 Jan 2025 13:28)      HPI:    74M PMH of Parkinson's, HTN, hx ischemic CVA x2, type 2 DM, dementia and chronic back pain, Hx of seizures on keppra BIBA from [place] facility  or generalized weakness, confusion, decreased PO intake and multiple witnessed falls over the past few days. He had two witnessed falls the day prior to admission where he was weak and slumped to the ground. No LOC, not on AC. Per staff, he is normally alert and oriented x 3 and able to perform ADLs, but for the past few days has been increasingly confused. He was evaluated in the ED the day prior for similar complaints, had a negative work up and was discharged. Infectious and trauma workup negative. On 1/21 patient was notes to have expressive aphasia and R facial droop. Evaluated by neurology, MRI ordered and showed acute stroke in the L corona radiata. CTA Head/Neck showed severe stenosis left MCA proximal M2. Moderate narrowing right proximal vertebral arter On 1/22 new onset L tongue deviation and mild dysarthria - repeat MRI brain with no new acute findings. ILR placed 1/24.    Prior to admission, the patient was independent in ADLs and ambulation without an assistive device Patient now requires Reza for bed mobility, modA for transfers, ambulating 40’x2 with RW and modA,  modA for LBD. Therefore, there is a significant functional decline from baseline. Patients also with medical comorbidities of DM, HTN, requiring medication management and monitoring of vitals by Physiatry team and nursing. To return home it is in the patient’s best interest to have acute inpatient rehabilitative services to undergo intensive interdisciplinary therapy. Furthermore, the patient is motivated and is able to tolerate up to 3 hours of daily therapy for 5-6 days a week for a total of 15 hours a week. Evaluated by Physiatry and deemed to be an excellent candidate for admission to  for acute inpatient rehab. Admitted to Acute Rehab on 1/24/25    LS: Lives alone in a private home, 6STE  PLOF: independent in ADLs and ambulation with no AS     (24 Jan 2025 13:28)    TODAY'S SUBJECTIVE & REVIEW OF SYMPTOMS:    No acute overnight events. Has a sitter at beside for safety.  Seen at bedside today.  No acute complaints. Vitals and labs reviewed Bowel and bladder continent . Sleeping well.  Tolerating therapy.     CLOF: Reza for bed mobility, modA for transfers, ambulating 40’x2 with RW and modA,  modA for LBD.        Constitutional:    [   ] WNL           [   ] poor appetite   [   ] insomnia   [   ] tired [ x] weak   Cardio:                [ x  ] WNL           [   ] CP   [   ] SY   [   ] palpitations               Resp:                   [ x  ] WNL           [   ] SOB   [   ] cough   [   ] wheezing   GI:                        [  x ] WNL           [   ] constipation   [   ] diarrhea   [   ] abdominal pain   [   ] nausea   [   ] emesis                                :                      [   x] WNL           [   ] LAM  [   ] dysuria   [   ] difficulty voiding             Endo:                   [  x ] WNL          [   ] polyuria   [   ] temperature intolerance                 Skin:                     [  x ] WNL          [   ] pain   [   ] wound   [   ] rash   MSK:                    [  x ] WNL          [   ] muscle pain   [   ] joint pain/ stiffness   [   ] muscle tenderness   [   ] swelling   Neuro:                 [ x  ] WNL          [   ] HA   [   ] change in vision   [   ] tremor   [   ] weakness   [   ]dysphagia              Cognitive:           [   ] WNL           [ x  ]confusion at times   Psych:                  [   ] WNL           [   ] hallucinations   [   ]agitation   [   ] delusion   [ x  ]depression/ anxiety    PHYSICAL EXAM    Vital Signs Last 24 Hrs  T(C): 36.4 (28 Jan 2025 05:58), Max: 36.4 (28 Jan 2025 05:58)  T(F): 97.5 (28 Jan 2025 05:58), Max: 97.5 (28 Jan 2025 05:58)  HR: 69 (28 Jan 2025 05:58) (69 - 69)  BP: 138/77 (28 Jan 2025 05:58) (138/77 - 138/77)  BP(mean): --  RR: 18 (28 Jan 2025 05:58) (18 - 18)  SpO2: --    General:[x   ] NAD, Resting Comfortable,   [   ] other:                                HEENT: [ x  ] NC/AT, EOMI, PERRL , Normal Conjunctivae,   [   ] other:  Cardio: [  x ] RRR, no murmur   [   ] other:                              Pulm: [  x ] No Respiratory Distress,  Lungs CTAB,   [   ] other:                       Abdomen: [x   ]ND/NT, Soft,   [   ] other:    : [ x  ] NO LAM CATHETER, [   ] LAM CATHETER- no meatal tear, no discharge, [   ] other:                                            MSK: [x   ] No joint swelling, Full ROM,   [   ] other:                                         Ext: [  x ]No C/C/E, No calf tenderness,   [   ]other:    Skin: [ x  ]intact,   [   ] other:                                                                   Neurological Examination:  Cognitive: [    ] AAO x 3,   [ x   ]  other: AOx2 - not place                                                                     Attention:  [  x  ] intact,   [  ]  other:                         Memory: [    ] intact,    [ x   ]  other:   2/3 delayed recall with verbal cues  Mood/Affect: [  x  ] wnl,    [    ]  other:                                                                             Communication: [    ]Fluent, no dysarthria, following commands:  [   x ] other: mild dysarthria, expressive aphasia, mild anomia, hypophonia   CN II - XII:  [    ] intact,  [ x   ] other: R facial droop                                                                                        Motor:   RIGHT UE: [  x ] WNL,  [   ] other:  LEFT    UE: [  x ] WNL,  [   ] other:  RIGHT LE: [ x  ] WNL,  [   ] other:   LEFT    LE: [x   ] WNL,  [   ] other:    Tone: [  x ] wnl,   [    ]  other:  DTRs: [   ]symmetric, [ x  ] other: R patellar reflex brisk 3+  Coordination:   [x    ] intact,   [    ] other:                                                                           Sensory: [   x ] Intact to light touch,   [    ] other:    MEDICATIONS  (STANDING):  amLODIPine   Tablet 5 milliGRAM(s) Oral daily  aspirin  chewable 81 milliGRAM(s) Oral daily  atorvastatin 80 milliGRAM(s) Oral at bedtime  carbidopa/levodopa  25/100 2 Tablet(s) Oral three times a day  carbidopa/levodopa CR 50/200 1 Tablet(s) Oral <User Schedule>  carvedilol 3.125 milliGRAM(s) Oral every 12 hours  chlorhexidine 2% Cloths 1 Application(s) Topical <User Schedule>  clopidogrel Tablet 75 milliGRAM(s) Oral daily  dextrose 5%. 1000 milliLiter(s) (50 mL/Hr) IV Continuous <Continuous>  dextrose 5%. 1000 milliLiter(s) (100 mL/Hr) IV Continuous <Continuous>  dextrose 50% Injectable 25 Gram(s) IV Push once  enoxaparin Injectable 40 milliGRAM(s) SubCutaneous every 24 hours  entacapone 200 milliGRAM(s) Oral three times a day  gabapentin 100 milliGRAM(s) Oral at bedtime  glucagon  Injectable 1 milliGRAM(s) IntraMuscular once  insulin lispro (ADMELOG) corrective regimen sliding scale   SubCutaneous three times a day before meals  levETIRAcetam 500 milliGRAM(s) Oral two times a day  mirtazapine 7.5 milliGRAM(s) Oral daily  polyethylene glycol 3350 17 Gram(s) Oral daily  rivastigmine patch  9.5 mG/24 Hr(s). 1 Patch Transdermal every 24 hours  senna 2 Tablet(s) Oral at bedtime  traZODone 100 milliGRAM(s) Oral at bedtime    MEDICATIONS  (PRN):  acetaminophen     Tablet .. 650 milliGRAM(s) Oral every 6 hours PRN Temp greater or equal to 38C (100.4F), Mild Pain (1 - 3)  aluminum hydroxide/magnesium hydroxide/simethicone Suspension 30 milliLiter(s) Oral every 4 hours PRN Dyspepsia  dextrose Oral Gel 15 Gram(s) Oral once PRN Blood Glucose LESS THAN 70 milliGRAM(s)/deciliter  magnesium hydroxide Suspension 30 milliLiter(s) Oral daily PRN Constipation  melatonin 3 milliGRAM(s) Oral at bedtime PRN Insomnia    RECENT LABS/IMAGING                          15.9   8.97  )-----------( 151      ( 27 Jan 2025 05:49 )             46.3     01-27    139  |  104  |  19  ----------------------------<  70  4.2   |  25  |  1.0    Ca    8.6      27 Jan 2025 05:49  Mg     2.0     01-27    TPro  5.8[L]  /  Alb  3.6  /  TBili  0.9  /  DBili  x   /  AST  15  /  ALT  <5  /  AlkPhos  87  01-27        POCT Blood Glucose.: 107 mg/dL (01-27-25 @ 11:52)  POCT Blood Glucose.: 72 mg/dL (01-27-25 @ 07:42)  POCT Blood Glucose.: 103 mg/dL (01-26-25 @ 20:34)  POCT Blood Glucose.: 116 mg/dL (01-26-25 @ 16:49)  POCT Blood Glucose.: 118 mg/dL (01-26-25 @ 11:52)  POCT Blood Glucose.: 90 mg/dL (01-26-25 @ 07:31)  POCT Blood Glucose.: 122 mg/dL (01-25-25 @ 21:09)  POCT Blood Glucose.: 105 mg/dL (01-25-25 @ 16:27)  POCT Blood Glucose.: 206 mg/dL (01-25-25 @ 11:39)  POCT Blood Glucose.: 82 mg/dL (01-25-25 @ 07:53)  POCT Blood Glucose.: 94 mg/dL (01-24-25 @ 21:20)  POCT Blood Glucose.: 104 mg/dL (01-24-25 @ 16:37)              Patient is a 74y old  Male who presents with a chief complaint of acute stroke and Parkinson's Syndrome with decline in function (24 Jan 2025 13:28)      HPI:    74M PMH of Parkinson's, HTN, hx ischemic CVA x2, type 2 DM, dementia and chronic back pain, Hx of seizures on keppra BIBA from [place] facility  or generalized weakness, confusion, decreased PO intake and multiple witnessed falls over the past few days. He had two witnessed falls the day prior to admission where he was weak and slumped to the ground. No LOC, not on AC. Per staff, he is normally alert and oriented x 3 and able to perform ADLs, but for the past few days has been increasingly confused. He was evaluated in the ED the day prior for similar complaints, had a negative work up and was discharged. Infectious and trauma workup negative. On 1/21 patient was notes to have expressive aphasia and R facial droop. Evaluated by neurology, MRI ordered and showed acute stroke in the L corona radiata. CTA Head/Neck showed severe stenosis left MCA proximal M2. Moderate narrowing right proximal vertebral arter On 1/22 new onset L tongue deviation and mild dysarthria - repeat MRI brain with no new acute findings. ILR placed 1/24.    Prior to admission, the patient was independent in ADLs and ambulation without an assistive device Patient now requires Reza for bed mobility, modA for transfers, ambulating 40’x2 with RW and modA,  modA for LBD. Therefore, there is a significant functional decline from baseline. Patients also with medical comorbidities of DM, HTN, requiring medication management and monitoring of vitals by Physiatry team and nursing. To return home it is in the patient’s best interest to have acute inpatient rehabilitative services to undergo intensive interdisciplinary therapy. Furthermore, the patient is motivated and is able to tolerate up to 3 hours of daily therapy for 5-6 days a week for a total of 15 hours a week. Evaluated by Physiatry and deemed to be an excellent candidate for admission to  for acute inpatient rehab. Admitted to Acute Rehab on 1/24/25    LS: Lives alone in a private home, 6STE  PLOF: independent in ADLs and ambulation with no AS     (24 Jan 2025 13:28)    TODAY'S SUBJECTIVE & REVIEW OF SYMPTOMS:    No acute overnight events. Has a sitter at beside for safety.  Seen at bedside today.  No acute complaints. Vitals and labs reviewed. Tolerating therapy.     CLOF: Reza for bed mobility, modA for transfers, ambulating 40’x2 with RW and modA,  modA for LBD.        Constitutional:    [   ] WNL           [   ] poor appetite   [   ] insomnia   [   ] tired [ x] weak   Cardio:                [ x  ] WNL           [   ] CP   [   ] SY   [   ] palpitations               Resp:                   [ x  ] WNL           [   ] SOB   [   ] cough   [   ] wheezing   GI:                        [  x ] WNL           [   ] constipation   [   ] diarrhea   [   ] abdominal pain   [   ] nausea   [   ] emesis                                :                      [   x] WNL           [   ] LAM  [   ] dysuria   [   ] difficulty voiding             Endo:                   [  x ] WNL          [   ] polyuria   [   ] temperature intolerance                 Skin:                     [  x ] WNL          [   ] pain   [   ] wound   [   ] rash   MSK:                    [  x ] WNL          [   ] muscle pain   [   ] joint pain/ stiffness   [   ] muscle tenderness   [   ] swelling   Neuro:                 [ x  ] WNL          [   ] HA   [   ] change in vision   [   ] tremor   [   ] weakness   [   ]dysphagia              Cognitive:           [   ] WNL           [ x  ]confusion at times - feels 'off'   Psych:                  [   ] WNL           [   ] hallucinations   [   ]agitation   [   ] delusion   [ x  ]depression/ anxiety    PHYSICAL EXAM    Vital Signs Last 24 Hrs  T(C): 36.4 (28 Jan 2025 05:58), Max: 36.4 (28 Jan 2025 05:58)  T(F): 97.5 (28 Jan 2025 05:58), Max: 97.5 (28 Jan 2025 05:58)  HR: 69 (28 Jan 2025 05:58) (69 - 69)  BP: 138/77 (28 Jan 2025 05:58) (138/77 - 138/77)  BP(mean): --  RR: 18 (28 Jan 2025 05:58) (18 - 18)  SpO2: --    General:[x   ] NAD, Resting Comfortable,   [   ] other:                                HEENT: [ x  ] NC/AT, EOMI, PERRL , Normal Conjunctivae,   [   ] other:  Cardio: [  x ] RRR, no murmur   [   ] other:                              Pulm: [  x ] No Respiratory Distress,  Lungs CTAB,   [   ] other:                       Abdomen: [x   ]ND/NT, Soft,   [   ] other:    : [ x  ] NO LAM CATHETER, [   ] LAM CATHETER- no meatal tear, no discharge, [   ] other:                                            MSK: [x   ] No joint swelling, Full ROM,   [   ] other:                                         Ext: [  x ]No C/C/E, No calf tenderness,   [   ]other:    Skin: [ x  ]intact,   [   ] other:                                                                   Neurological Examination:  Cognitive: [    ] AAO x 3,   [ x   ]  other: AOx2 - not place                                                                     Attention:  [  x  ] intact,   [  ]  other:                         Memory: [    ] intact,    [ x   ]  other:   2/3 delayed recall with verbal cues  Mood/Affect: [  x  ] wnl,    [    ]  other:                                                                             Communication: [    ]Fluent, no dysarthria, following commands:  [   x ] other: mild dysarthria, expressive aphasia, mild anomia, hypophonia   CN II - XII:  [    ] intact,  [ x   ] other: R facial droop                                                                                        Motor:   RIGHT UE: [  x ] WNL,  [   ] other:  LEFT    UE: [  x ] WNL,  [   ] other:  RIGHT LE: [ x  ] WNL,  [   ] other:   LEFT    LE: [x   ] WNL,  [   ] other:    Tone: [  x ] wnl,   [    ]  other:  DTRs: [   ]symmetric, [ x  ] other: R patellar reflex brisk 3+  Coordination:   [x    ] intact,   [    ] other:                                                                           Sensory: [   x ] Intact to light touch,   [    ] other:    MEDICATIONS  (STANDING):  amLODIPine   Tablet 5 milliGRAM(s) Oral daily  aspirin  chewable 81 milliGRAM(s) Oral daily  atorvastatin 80 milliGRAM(s) Oral at bedtime  carbidopa/levodopa  25/100 2 Tablet(s) Oral three times a day  carbidopa/levodopa CR 50/200 1 Tablet(s) Oral <User Schedule>  carvedilol 3.125 milliGRAM(s) Oral every 12 hours  chlorhexidine 2% Cloths 1 Application(s) Topical <User Schedule>  clopidogrel Tablet 75 milliGRAM(s) Oral daily  dextrose 5%. 1000 milliLiter(s) (50 mL/Hr) IV Continuous <Continuous>  dextrose 5%. 1000 milliLiter(s) (100 mL/Hr) IV Continuous <Continuous>  dextrose 50% Injectable 25 Gram(s) IV Push once  enoxaparin Injectable 40 milliGRAM(s) SubCutaneous every 24 hours  entacapone 200 milliGRAM(s) Oral three times a day  gabapentin 100 milliGRAM(s) Oral at bedtime  glucagon  Injectable 1 milliGRAM(s) IntraMuscular once  insulin lispro (ADMELOG) corrective regimen sliding scale   SubCutaneous three times a day before meals  levETIRAcetam 500 milliGRAM(s) Oral two times a day  mirtazapine 7.5 milliGRAM(s) Oral daily  polyethylene glycol 3350 17 Gram(s) Oral daily  rivastigmine patch  9.5 mG/24 Hr(s). 1 Patch Transdermal every 24 hours  senna 2 Tablet(s) Oral at bedtime  traZODone 100 milliGRAM(s) Oral at bedtime    MEDICATIONS  (PRN):  acetaminophen     Tablet .. 650 milliGRAM(s) Oral every 6 hours PRN Temp greater or equal to 38C (100.4F), Mild Pain (1 - 3)  aluminum hydroxide/magnesium hydroxide/simethicone Suspension 30 milliLiter(s) Oral every 4 hours PRN Dyspepsia  dextrose Oral Gel 15 Gram(s) Oral once PRN Blood Glucose LESS THAN 70 milliGRAM(s)/deciliter  magnesium hydroxide Suspension 30 milliLiter(s) Oral daily PRN Constipation  melatonin 3 milliGRAM(s) Oral at bedtime PRN Insomnia    RECENT LABS/IMAGING                          15.9   8.97  )-----------( 151      ( 27 Jan 2025 05:49 )             46.3     01-27    139  |  104  |  19  ----------------------------<  70  4.2   |  25  |  1.0    Ca    8.6      27 Jan 2025 05:49  Mg     2.0     01-27    TPro  5.8[L]  /  Alb  3.6  /  TBili  0.9  /  DBili  x   /  AST  15  /  ALT  <5  /  AlkPhos  87  01-27        POCT Blood Glucose.: 107 mg/dL (01-27-25 @ 11:52)  POCT Blood Glucose.: 72 mg/dL (01-27-25 @ 07:42)  POCT Blood Glucose.: 103 mg/dL (01-26-25 @ 20:34)  POCT Blood Glucose.: 116 mg/dL (01-26-25 @ 16:49)  POCT Blood Glucose.: 118 mg/dL (01-26-25 @ 11:52)  POCT Blood Glucose.: 90 mg/dL (01-26-25 @ 07:31)  POCT Blood Glucose.: 122 mg/dL (01-25-25 @ 21:09)  POCT Blood Glucose.: 105 mg/dL (01-25-25 @ 16:27)  POCT Blood Glucose.: 206 mg/dL (01-25-25 @ 11:39)  POCT Blood Glucose.: 82 mg/dL (01-25-25 @ 07:53)  POCT Blood Glucose.: 94 mg/dL (01-24-25 @ 21:20)  POCT Blood Glucose.: 104 mg/dL (01-24-25 @ 16:37)

## 2025-01-28 NOTE — PROGRESS NOTE ADULT - ATTENDING COMMENTS
I reviewed the chart and examined the patient with the resident and we discussed the findings and treatment plan.  The patient is tolerating the rehab program well. I agree with the findings and treatment plan above, which I modified as indicated. The patient requires 3 hrs a day of acute inpatient rehab. No new complaints. Still feeling cognitively impaired from baseline, objectively worse than previous admissions per ST. Will d/c Neurontin and decrease Trazadone from 100mg hs to 50mg hs and monitor. Continue full rehab program.    I read, edited and agree with the physical exam above and assessment:  #Rehab of gait abnormality, dysarthria and expressive aphasia iso acute L corona radiata stroke  -Imaging as above  -s/p ILR 1/24  -s/p  EEG 1/22: generalized slowing, no seizures  - A1C 4.9, , TSH 2.81  -c/w ASA and plavix for 90 days  -c/w lipitor 80mg qhs  -PT/OT/SLP/Neuropsych eval and treat     #HTN  -c/w amlodipine 5mg daily  -c/w coreg 3.125mg q12h  -Monitor BP     #HLD  -c/w lipitor 80mg qhs    #DM2  - A1C with Estimated Average Glucose Result: 4.9: Method: Immunoassay - well controlled  - on low dose gabapentin at night. No c/o of pain or dysesthesias. Will d/c and monitor.    #Hx of seizures  -c/w keppra 500 bid    #Hx of Parkinson’s  #Hx of dementia. Mild and does not interfere with patient's ability to participate in and benefit from acute rehab.  -c/w home meds sinemet, entacapone, and rivastigmine     #Anxiety/depression  #Insomnia  -c/w mirtazapine 7.5 daily  -c/w trazadone 100mg qhs - given increased confusion, will decrease to 50mg hs and monitor  -c/w melatonin 3mg qhs prn

## 2025-01-28 NOTE — PROGRESS NOTE ADULT - ASSESSMENT
74M PMH of Parkinson's, HTN, hx ischemic CVA x2, type 2 DM, dementia and chronic back pain, Hx of seizures on keppra BIBA from [facility] facility  or generalized weakness, confusion, decreased PO intake and multiple witnessed falls over the past few days. He had two witnessed falls the day prior to admission where he was weak and slumped to the ground. No LOC, not on AC. Per staff, he is normally alert and oriented x 3 and able to perform ADLs, but for the past few days has been increasingly confused. He was evaluated in the ED the day prior for similar complaints, had a negative work up and was discharged. Infectious and trauma workup negative. On 1/21 patient was notes to have expressive aphasia and R facial droop. Evaluated by neurology, MRI ordered and showed acute stroke in the L corona radiata. CTA Head/Neck showed severe stenosis left MCA proximal M2. Moderate narrowing right proximal vertebral artery On 1/22 new onset L tongue deviation and mild dysarthria - repeat MRI brain with no new acute findings. ILR placed 1/24. NIHSS 4 on admission.    #Rehab of gait abnormality, dysarthria and expressive aphasia iso acute L corona radiata stroke  -Imaging as above  -s/p ILR 1/24  -s/p  EEG 1/22: generalized slowing, no seizures  - A1C 4.9, , TSH 2.81  -c/w ASA and plavix for 90 days  -c/w lipitor 80mg qhs  -PT/OT/SLP/Neuropsych eval and treat     #HTN  -c/w amlodipine 5mg daily  -c/w coreg 3.125mg q12h  -Monitor BP     #HLD  -c/w lipitor 80mg qhs    #DM2  - A1C with Estimated Average Glucose Result: 4.9: Method: Immunoassay - well controlled    #Hx of seizures  -c/w keppra 500 bid    #Hx of Parkinson’s  #Hx of dementia. Mild and does not interfere with patient's ability to participate in and benefit from acute rehab.  -c/w home meds sinemet, entacapone, and rivastigmine     #Anxiety/depression  #Insomnia  -c/w mirtazapine 7.5 daily  -c/w trazadone 100mg qhs  -c/w melatonin 3mg qhs prn     -Pain control: tylenol prn    -GI/Bowel Mgmt: senna/miralax    -Bladder management:      -Skin:  No active issues at this time    -FEN     - Diet: Easy to chew with thins DASH/carb consistent           Precautions / PROPHYLAXIS:      - Falls      - DVT prophylaxis: Lovenox     74M PMH of Parkinson's, HTN, hx ischemic CVA x2, type 2 DM, dementia and chronic back pain, Hx of seizures on keppra BIBA from [place] facility  or generalized weakness, confusion, decreased PO intake and multiple witnessed falls over the past few days. He had two witnessed falls the day prior to admission where he was weak and slumped to the ground. No LOC, not on AC. Per staff, he is normally alert and oriented x 3 and able to perform ADLs, but for the past few days has been increasingly confused. He was evaluated in the ED the day prior for similar complaints, had a negative work up and was discharged. Infectious and trauma workup negative. On 1/21 patient was notes to have expressive aphasia and R facial droop. Evaluated by neurology, MRI ordered and showed acute stroke in the L corona radiata. CTA Head/Neck showed severe stenosis left MCA proximal M2. Moderate narrowing right proximal vertebral artery On 1/22 new onset L tongue deviation and mild dysarthria - repeat MRI brain with no new acute findings. ILR placed 1/24. NIHSS 4 on admission.    #Rehab of gait abnormality, dysarthria and expressive aphasia iso acute L corona radiata stroke  -Imaging as above  -s/p ILR 1/24  -s/p  EEG 1/22: generalized slowing, no seizures  - A1C 4.9, , TSH 2.81  -c/w ASA and plavix for 90 days  -c/w lipitor 80mg qhs  -PT/OT/SLP/Neuropsych eval and treat     #HTN  -c/w amlodipine 5mg daily  -c/w coreg 3.125mg q12h  -Monitor BP     #HLD  -c/w lipitor 80mg qhs    #DM2  - A1C with Estimated Average Glucose Result: 4.9: Method: Immunoassay - well controlled  - on low dose gabapentin at night. No c/o of pain or dysesthesias. Will d/c and monitor.    #Hx of seizures  -c/w keppra 500 bid    #Hx of Parkinson’s  #Hx of dementia. Mild and does not interfere with patient's ability to participate in and benefit from acute rehab.  -c/w home meds sinemet, entacapone, and rivastigmine     #Anxiety/depression  #Insomnia  -c/w mirtazapine 7.5 daily  -c/w trazadone 100mg qhs - given increased confusion, will decrease to 50mg hs and monitor  -c/w melatonin 3mg qhs prn     -Pain control: tylenol prn    -GI/Bowel Mgmt: senna/miralax    - Diet: Easy to chew with thins DASH/carb consistent           Precautions / PROPHYLAXIS:      - Falls      - DVT prophylaxis: Lovenox

## 2025-01-29 LAB
GLUCOSE BLDC GLUCOMTR-MCNC: 119 MG/DL — HIGH (ref 70–99)
GLUCOSE BLDC GLUCOMTR-MCNC: 172 MG/DL — HIGH (ref 70–99)
GLUCOSE BLDC GLUCOMTR-MCNC: 89 MG/DL — SIGNIFICANT CHANGE UP (ref 70–99)
GLUCOSE BLDC GLUCOMTR-MCNC: 93 MG/DL — SIGNIFICANT CHANGE UP (ref 70–99)

## 2025-01-29 RX ADMIN — MIRTAZAPINE 7.5 MILLIGRAM(S): 30 TABLET, FILM COATED ORAL at 12:11

## 2025-01-29 RX ADMIN — LEVETIRACETAM 500 MILLIGRAM(S): 750 TABLET, FILM COATED ORAL at 17:24

## 2025-01-29 RX ADMIN — ENTACAPONE 200 MILLIGRAM(S): 200 TABLET, FILM COATED ORAL at 05:56

## 2025-01-29 RX ADMIN — Medication 1: at 11:51

## 2025-01-29 RX ADMIN — Medication 3.12 MILLIGRAM(S): at 06:04

## 2025-01-29 RX ADMIN — Medication 50 MILLIGRAM(S): at 22:08

## 2025-01-29 RX ADMIN — ENTACAPONE 200 MILLIGRAM(S): 200 TABLET, FILM COATED ORAL at 22:07

## 2025-01-29 RX ADMIN — Medication 75 MILLIGRAM(S): at 12:11

## 2025-01-29 RX ADMIN — Medication 2 TABLET(S): at 22:08

## 2025-01-29 RX ADMIN — Medication 2 TABLET(S): at 22:06

## 2025-01-29 RX ADMIN — ANTISEPTIC SURGICAL SCRUB 1 APPLICATION(S): 0.04 SOLUTION TOPICAL at 06:05

## 2025-01-29 RX ADMIN — ENOXAPARIN SODIUM 40 MILLIGRAM(S): 100 INJECTION SUBCUTANEOUS at 05:55

## 2025-01-29 RX ADMIN — Medication 2 TABLET(S): at 12:23

## 2025-01-29 RX ADMIN — Medication 2 TABLET(S): at 05:55

## 2025-01-29 RX ADMIN — ATORVASTATIN CALCIUM 80 MILLIGRAM(S): 80 TABLET, FILM COATED ORAL at 22:08

## 2025-01-29 RX ADMIN — Medication 5 MILLIGRAM(S): at 05:55

## 2025-01-29 RX ADMIN — ASPIRIN 81 MILLIGRAM(S): 81 TABLET, COATED ORAL at 12:11

## 2025-01-29 RX ADMIN — LEVETIRACETAM 500 MILLIGRAM(S): 750 TABLET, FILM COATED ORAL at 05:55

## 2025-01-29 RX ADMIN — ENTACAPONE 200 MILLIGRAM(S): 200 TABLET, FILM COATED ORAL at 12:11

## 2025-01-29 RX ADMIN — POLYETHYLENE GLYCOL 3350 17 GRAM(S): 17 POWDER, FOR SOLUTION ORAL at 12:19

## 2025-01-29 RX ADMIN — Medication 3.12 MILLIGRAM(S): at 17:23

## 2025-01-29 RX ADMIN — RIVASTIGMINE 1 PATCH: 4.6 PATCH, EXTENDED RELEASE TRANSDERMAL at 19:00

## 2025-01-29 RX ADMIN — RIVASTIGMINE 1 PATCH: 4.6 PATCH, EXTENDED RELEASE TRANSDERMAL at 05:56

## 2025-01-29 RX ADMIN — Medication 1 TABLET(S): at 17:23

## 2025-01-29 NOTE — PROGRESS NOTE ADULT - ASSESSMENT
74M PMH of Parkinson's, HTN, hx ischemic CVA x2, type 2 DM, dementia and chronic back pain, Hx of seizures on keppra BIBA from [facility] facility  or generalized weakness, confusion, decreased PO intake and multiple witnessed falls over the past few days. He had two witnessed falls the day prior to admission where he was weak and slumped to the ground. No LOC, not on AC. Per staff, he is normally alert and oriented x 3 and able to perform ADLs, but for the past few days has been increasingly confused. He was evaluated in the ED the day prior for similar complaints, had a negative work up and was discharged. Infectious and trauma workup negative. On 1/21 patient was notes to have expressive aphasia and R facial droop. Evaluated by neurology, MRI ordered and showed acute stroke in the L corona radiata. CTA Head/Neck showed severe stenosis left MCA proximal M2. Moderate narrowing right proximal vertebral artery On 1/22 new onset L tongue deviation and mild dysarthria - repeat MRI brain with no new acute findings. ILR placed 1/24. NIHSS 4 on admission.    #Rehab of gait abnormality, dysarthria and expressive aphasia iso acute L corona radiata stroke  -Imaging as above  -s/p ILR 1/24  -s/p  EEG 1/22: generalized slowing, no seizures  - A1C 4.9, , TSH 2.81  -c/w ASA and plavix for 90 days  -c/w lipitor 80mg qhs  -PT/OT/SLP/Neuropsych eval and treat   - mobility and ADLs improving    #HTN  -c/w amlodipine 5mg daily  -c/w coreg 3.125mg q12h  -Monitor BP     #HLD  -c/w lipitor 80mg qhs    #DM2  - A1C with Estimated Average Glucose Result: 4.9: Method: Immunoassay - well controlled  - on low dose gabapentin at night. No c/o of pain or dysesthesias. Will d/c and monitor.    #Hx of seizures  -c/w keppra 500 bid    #Hx of Parkinson’s  #Hx of dementia. Mild and does not interfere with patient's ability to participate in and benefit from acute rehab.  -c/w home meds sinemet, entacapone, and rivastigmine     #Anxiety/depression  #Insomnia  -c/w mirtazapine 7.5 daily  -c/w trazadone 100mg qhs - given increased confusion, will decrease to 50mg hs and monitor  -c/w melatonin 3mg qhs prn     -Pain control: tylenol prn    -GI/Bowel Mgmt: senna/miralax    - Diet: Easy to chew with thins DASH/carb consistent           Precautions / PROPHYLAXIS:      - Falls      - DVT prophylaxis: Lovenox

## 2025-01-29 NOTE — PROGRESS NOTE ADULT - SUBJECTIVE AND OBJECTIVE BOX
Patient is a 74y old  Male who presents with a chief complaint of acute stroke and Parkinson's Syndrome with decline in function (24 Jan 2025 13:28)      HPI:    74M PMH of Parkinson's, HTN, hx ischemic CVA x2, type 2 DM, dementia and chronic back pain, Hx of seizures on keppra BIBA from [place] facility  or generalized weakness, confusion, decreased PO intake and multiple witnessed falls over the past few days. He had two witnessed falls the day prior to admission where he was weak and slumped to the ground. No LOC, not on AC. Per staff, he is normally alert and oriented x 3 and able to perform ADLs, but for the past few days has been increasingly confused. He was evaluated in the ED the day prior for similar complaints, had a negative work up and was discharged. Infectious and trauma workup negative. On 1/21 patient was notes to have expressive aphasia and R facial droop. Evaluated by neurology, MRI ordered and showed acute stroke in the L corona radiata. CTA Head/Neck showed severe stenosis left MCA proximal M2. Moderate narrowing right proximal vertebral arter On 1/22 new onset L tongue deviation and mild dysarthria - repeat MRI brain with no new acute findings. ILR placed 1/24.    Prior to admission, the patient was independent in ADLs and ambulation without an assistive device Patient now requires Reza for bed mobility, modA for transfers, ambulating 40’x2 with RW and modA,  modA for LBD. Therefore, there is a significant functional decline from baseline. Patients also with medical comorbidities of DM, HTN, requiring medication management and monitoring of vitals by Physiatry team and nursing. To return home it is in the patient’s best interest to have acute inpatient rehabilitative services to undergo intensive interdisciplinary therapy. Furthermore, the patient is motivated and is able to tolerate up to 3 hours of daily therapy for 5-6 days a week for a total of 15 hours a week. Evaluated by Physiatry and deemed to be an excellent candidate for admission to  for acute inpatient rehab. Admitted to Acute Rehab on 1/24/25    LS: Lives alone in a private home, 6STE  PLOF: independent in ADLs and ambulation with no AS     (24 Jan 2025 13:28)    TODAY'S SUBJECTIVE & REVIEW OF SYMPTOMS:    Neuro stable. No acute complaints. Vitals and labs reviewed. Tolerating therapy.     CLOF: Reza for bed mobility, min A for transfers, ambulating 75 ft. with RW and min A, dressing with mi assist       Constitutional:    [   ] WNL           [   ] poor appetite   [   ] insomnia   [   ] tired   [ x] weak   Cardio:                [ x  ] WNL           [   ] CP   [   ] SY   [   ] palpitations               Resp:                   [ x  ] WNL           [   ] SOB   [   ] cough   [   ] wheezing   GI:                        [  x ] WNL           [   ] constipation   [   ] diarrhea   [   ] abdominal pain   [   ] nausea   [   ] emesis                                :                      [   x] WNL           [   ] LAM  [   ] dysuria   [   ] difficulty voiding             Endo:                   [  x ] WNL          [   ] polyuria   [   ] temperature intolerance                 Skin:                     [  x ] WNL          [   ] pain   [   ] wound   [   ] rash   MSK:                    [  x ] WNL          [   ] muscle pain   [   ] joint pain/ stiffness   [   ] muscle tenderness   [   ] swelling   Neuro:                 [ x  ] WNL          [   ] HA   [   ] change in vision   [   ] tremor   [   ] weakness   [   ]dysphagia              Cognitive:           [   ] WNL           [ x  ]confusion at times - feels 'off'   Psych:                  [   ] WNL           [   ] hallucinations   [   ]agitation   [   ] delusion   [ x  ]depression/ anxiety    PHYSICAL EXAM    Vital Signs Last 24 Hrs  T(C): 37 (29 Jan 2025 12:48), Max: 37 (29 Jan 2025 12:48)  T(F): 98.6 (29 Jan 2025 12:48), Max: 98.6 (29 Jan 2025 12:48)  HR: 86 (29 Jan 2025 12:48) (62 - 86)  BP: 114/77 (29 Jan 2025 12:48) (114/77 - 141/82)  BP(mean): --  RR: 18 (29 Jan 2025 12:48) (18 - 18)  SpO2: 97% (29 Jan 2025 12:48) (91% - 98%)    Parameters below as of 29 Jan 2025 05:40  Patient On (Oxygen Delivery Method): room air      General:[x   ] NAD, Resting Comfortable,   [   ] other:                                HEENT: [ x  ] NC/AT, EOMI, PERRL , Normal Conjunctivae,   [   ] other:  Cardio: [  x ] RRR, no murmur   [   ] other:                              Pulm: [  x ] No Respiratory Distress,  Lungs CTAB,   [   ] other:                       Abdomen: [x   ]ND/NT, Soft,   [   ] other:    : [ x  ] NO LAM CATHETER, [   ] LAM CATHETER- no meatal tear, no discharge, [   ] other:                                            MSK: [x   ] No joint swelling, Full ROM,   [   ] other:                                         Ext: [  x ]No C/C/E, No calf tenderness,   [   ]other:    Skin: [ x  ]intact,   [   ] other:                                                                   Neurological Examination:  Cognitive: [    ] AAO x 3,   [ x   ]  other: AOx2 - not place                                                                     Attention:  [  x  ] intact,   [  ]  other:                         Memory: [    ] intact,    [ x   ]  other:   2/3 delayed recall with verbal cues  Mood/Affect: [  x  ] wnl,    [    ]  other:                                                                             Communication: [    ]Fluent, no dysarthria, following commands:  [   x ] other: mild dysarthria, expressive aphasia, mild anomia, hypophonia   CN II - XII:  [    ] intact,  [ x   ] other: R facial droop                                                                                        Motor:   RIGHT UE: [  x ] WNL,  [   ] other:  LEFT    UE: [  x ] WNL,  [   ] other:  RIGHT LE: [ x  ] WNL,  [   ] other:   LEFT    LE: [x   ] WNL,  [   ] other:    Tone: [  x ] wnl,   [    ]  other:  DTRs: [   ]symmetric, [ x  ] other: R patellar reflex brisk 3+  Coordination:   [x    ] intact,   [    ] other:                                                                           Sensory: [   x ] Intact to light touch,   [    ] other:    MEDICATIONS  (STANDING):  amLODIPine   Tablet 5 milliGRAM(s) Oral daily  aspirin  chewable 81 milliGRAM(s) Oral daily  atorvastatin 80 milliGRAM(s) Oral at bedtime  carbidopa/levodopa  25/100 2 Tablet(s) Oral three times a day  carbidopa/levodopa CR 50/200 1 Tablet(s) Oral <User Schedule>  carvedilol 3.125 milliGRAM(s) Oral every 12 hours  chlorhexidine 2% Cloths 1 Application(s) Topical <User Schedule>  clopidogrel Tablet 75 milliGRAM(s) Oral daily  dextrose 5%. 1000 milliLiter(s) (50 mL/Hr) IV Continuous <Continuous>  dextrose 5%. 1000 milliLiter(s) (100 mL/Hr) IV Continuous <Continuous>  dextrose 50% Injectable 25 Gram(s) IV Push once  enoxaparin Injectable 40 milliGRAM(s) SubCutaneous every 24 hours  entacapone 200 milliGRAM(s) Oral three times a day  glucagon  Injectable 1 milliGRAM(s) IntraMuscular once  insulin lispro (ADMELOG) corrective regimen sliding scale   SubCutaneous three times a day before meals  levETIRAcetam 500 milliGRAM(s) Oral two times a day  mirtazapine 7.5 milliGRAM(s) Oral daily  polyethylene glycol 3350 17 Gram(s) Oral daily  rivastigmine patch  9.5 mG/24 Hr(s). 1 Patch Transdermal every 24 hours  senna 2 Tablet(s) Oral at bedtime  traZODone 50 milliGRAM(s) Oral at bedtime    MEDICATIONS  (PRN):  acetaminophen     Tablet .. 650 milliGRAM(s) Oral every 6 hours PRN Temp greater or equal to 38C (100.4F), Mild Pain (1 - 3)  aluminum hydroxide/magnesium hydroxide/simethicone Suspension 30 milliLiter(s) Oral every 4 hours PRN Dyspepsia  dextrose Oral Gel 15 Gram(s) Oral once PRN Blood Glucose LESS THAN 70 milliGRAM(s)/deciliter  magnesium hydroxide Suspension 30 milliLiter(s) Oral daily PRN Constipation  melatonin 3 milliGRAM(s) Oral at bedtime PRN Insomnia        RECENT LABS/IMAGING                          15.9   8.97  )-----------( 151      ( 27 Jan 2025 05:49 )             46.3     01-27    139  |  104  |  19  ----------------------------<  70  4.2   |  25  |  1.0    Ca    8.6      27 Jan 2025 05:49  Mg     2.0     01-27    TPro  5.8[L]  /  Alb  3.6  /  TBili  0.9  /  DBili  x   /  AST  15  /  ALT  <5  /  AlkPhos  87  01-27      CAPILLARY BLOOD GLUCOSE      POCT Blood Glucose.: 172 mg/dL (29 Jan 2025 11:02)  POCT Blood Glucose.: 89 mg/dL (29 Jan 2025 07:26)  POCT Blood Glucose.: 172 mg/dL (28 Jan 2025 21:34)  POCT Blood Glucose.: 123 mg/dL (28 Jan 2025 16:10)    POCT Blood Glucose.: 107 mg/dL (01-27-25 @ 11:52)  POCT Blood Glucose.: 72 mg/dL (01-27-25 @ 07:42)  POCT Blood Glucose.: 103 mg/dL (01-26-25 @ 20:34)  POCT Blood Glucose.: 116 mg/dL (01-26-25 @ 16:49)  POCT Blood Glucose.: 118 mg/dL (01-26-25 @ 11:52)  POCT Blood Glucose.: 90 mg/dL (01-26-25 @ 07:31)  POCT Blood Glucose.: 122 mg/dL (01-25-25 @ 21:09)  POCT Blood Glucose.: 105 mg/dL (01-25-25 @ 16:27)  POCT Blood Glucose.: 206 mg/dL (01-25-25 @ 11:39)  POCT Blood Glucose.: 82 mg/dL (01-25-25 @ 07:53)  POCT Blood Glucose.: 94 mg/dL (01-24-25 @ 21:20)  POCT Blood Glucose.: 104 mg/dL (01-24-25 @ 16:37)

## 2025-01-30 LAB
GLUCOSE BLDC GLUCOMTR-MCNC: 165 MG/DL — HIGH (ref 70–99)
GLUCOSE BLDC GLUCOMTR-MCNC: 222 MG/DL — HIGH (ref 70–99)
GLUCOSE BLDC GLUCOMTR-MCNC: 65 MG/DL — LOW (ref 70–99)
GLUCOSE BLDC GLUCOMTR-MCNC: 75 MG/DL — SIGNIFICANT CHANGE UP (ref 70–99)
GLUCOSE BLDC GLUCOMTR-MCNC: 88 MG/DL — SIGNIFICANT CHANGE UP (ref 70–99)

## 2025-01-30 RX ADMIN — ENTACAPONE 200 MILLIGRAM(S): 200 TABLET, FILM COATED ORAL at 05:26

## 2025-01-30 RX ADMIN — RIVASTIGMINE 1 PATCH: 4.6 PATCH, EXTENDED RELEASE TRANSDERMAL at 19:46

## 2025-01-30 RX ADMIN — LEVETIRACETAM 500 MILLIGRAM(S): 750 TABLET, FILM COATED ORAL at 05:27

## 2025-01-30 RX ADMIN — LEVETIRACETAM 500 MILLIGRAM(S): 750 TABLET, FILM COATED ORAL at 18:04

## 2025-01-30 RX ADMIN — ANTISEPTIC SURGICAL SCRUB 1 APPLICATION(S): 0.04 SOLUTION TOPICAL at 05:27

## 2025-01-30 RX ADMIN — ASPIRIN 81 MILLIGRAM(S): 81 TABLET, COATED ORAL at 11:04

## 2025-01-30 RX ADMIN — Medication 2 TABLET(S): at 14:28

## 2025-01-30 RX ADMIN — Medication 1 TABLET(S): at 18:35

## 2025-01-30 RX ADMIN — Medication 2 TABLET(S): at 20:43

## 2025-01-30 RX ADMIN — Medication 2 TABLET(S): at 05:26

## 2025-01-30 RX ADMIN — Medication 75 MILLIGRAM(S): at 11:04

## 2025-01-30 RX ADMIN — MIRTAZAPINE 7.5 MILLIGRAM(S): 30 TABLET, FILM COATED ORAL at 11:04

## 2025-01-30 RX ADMIN — ENTACAPONE 200 MILLIGRAM(S): 200 TABLET, FILM COATED ORAL at 14:28

## 2025-01-30 RX ADMIN — Medication 5 MILLIGRAM(S): at 05:27

## 2025-01-30 RX ADMIN — Medication 3.12 MILLIGRAM(S): at 18:04

## 2025-01-30 RX ADMIN — ENTACAPONE 200 MILLIGRAM(S): 200 TABLET, FILM COATED ORAL at 20:42

## 2025-01-30 RX ADMIN — RIVASTIGMINE 1 PATCH: 4.6 PATCH, EXTENDED RELEASE TRANSDERMAL at 06:06

## 2025-01-30 RX ADMIN — Medication 50 MILLIGRAM(S): at 20:43

## 2025-01-30 RX ADMIN — ATORVASTATIN CALCIUM 80 MILLIGRAM(S): 80 TABLET, FILM COATED ORAL at 20:43

## 2025-01-30 RX ADMIN — Medication 3.12 MILLIGRAM(S): at 05:27

## 2025-01-30 RX ADMIN — ENOXAPARIN SODIUM 40 MILLIGRAM(S): 100 INJECTION SUBCUTANEOUS at 06:07

## 2025-01-30 RX ADMIN — RIVASTIGMINE 1 PATCH: 4.6 PATCH, EXTENDED RELEASE TRANSDERMAL at 08:39

## 2025-01-30 RX ADMIN — Medication 1: at 11:31

## 2025-01-30 NOTE — PROGRESS NOTE ADULT - ASSESSMENT
Neuropsychology Follow Up      Treatment focused on: Mood Check-In     Pain: No     Location: N/a     Orientation: WFL     Arousal Level: Alert     Behavior: Cooperative     Affect/Mood Range: Euthymic      Needed: No   #: N/A     Attention: WFL     Insight into illness/deficits: WFL          Treatment Session Focused on Mood Evaluation:     Patient was seen for a supportive therapy session following reports of a decrease in mood secondary to an acute stroke and Parkinson’s disease.          Patient was seen at bedside today and denied pain. The patient exhibited an improvement with mood, as evidenced by smiling, laughter, and sharing stories with the clinician on musical experiences (e.g., going on tours with [employer]). Sleep continues to improve, while reporting getting up to 6 hours.            Psychoeducation was performed to provide the cognitive and physical limitations due to Parkinson’s disease           Positive support and mindfulness exercises were reviewed to help improve mood. Methods included the following:     Creating SMART goals (e.g., increasing the number of times he performs physical therapy exercises to strengthen his body).     Music therapy (e.g., playing music on his phone or playing the guitar).      Narrative Therapy (e.g., help patient create new, positive stories about his life so he can feel more empowered and in control)          Progress Note:     ?Patient’s mood profile appears to have improved. Patient continues to demonstrate keen insight. Will continue to monitor mood and provide therapeutic interventions (e.g., Motivational Interviewing). Clinician will provide therapeutic and Parkinson’s disease related resources to loved ones and implement cognitive remediation strategies with the patient.           Recommendations for Treatment Team:     Mood Monitoring and Psychosocial Support: Will continue to incorporate psychosocial support as needed to help with adjustment and emotional regulation. When a therapist is working with the patient, it would be helpful to provide frequent praise and validate the difficulty the patient may experience, given the nature of his stroke. Additionally, implementing music aids (guitar) during session helps improve patient’s cognition; which includes his processing speed, limit impulsivity, and improves alertness.

## 2025-01-30 NOTE — PROGRESS NOTE ADULT - SUBJECTIVE AND OBJECTIVE BOX
Patient is a 74y old  Male who presents with a chief complaint of acute stroke and Parkinson's Syndrome with decline in function (24 Jan 2025 13:28)      HPI:    74M PMH of Parkinson's, HTN, hx ischemic CVA x2, type 2 DM, dementia and chronic back pain, Hx of seizures on keppra BIBA from [place] facility  or generalized weakness, confusion, decreased PO intake and multiple witnessed falls over the past few days. He had two witnessed falls the day prior to admission where he was weak and slumped to the ground. No LOC, not on AC. Per staff, he is normally alert and oriented x 3 and able to perform ADLs, but for the past few days has been increasingly confused. He was evaluated in the ED the day prior for similar complaints, had a negative work up and was discharged. Infectious and trauma workup negative. On 1/21 patient was notes to have expressive aphasia and R facial droop. Evaluated by neurology, MRI ordered and showed acute stroke in the L corona radiata. CTA Head/Neck showed severe stenosis left MCA proximal M2. Moderate narrowing right proximal vertebral arter On 1/22 new onset L tongue deviation and mild dysarthria - repeat MRI brain with no new acute findings. ILR placed 1/24.    Prior to admission, the patient was independent in ADLs and ambulation without an assistive device Patient now requires Reza for bed mobility, modA for transfers, ambulating 40’x2 with RW and modA,  modA for LBD. Therefore, there is a significant functional decline from baseline. Patients also with medical comorbidities of DM, HTN, requiring medication management and monitoring of vitals by Physiatry team and nursing. To return home it is in the patient’s best interest to have acute inpatient rehabilitative services to undergo intensive interdisciplinary therapy. Furthermore, the patient is motivated and is able to tolerate up to 3 hours of daily therapy for 5-6 days a week for a total of 15 hours a week. Evaluated by Physiatry and deemed to be an excellent candidate for admission to  for acute inpatient rehab. Admitted to Acute Rehab on 1/24/25    LS: Lives alone in a private home, 6STE  PLOF: independent in ADLs and ambulation with no AS     (24 Jan 2025 13:28)    TODAY'S SUBJECTIVE & REVIEW OF SYMPTOMS:    Neuro stable. No acute complaints. Vitals and labs reviewed. Tolerating therapy. Feeling less confused.    CLOF: Reza for bed mobility, min A for transfers, ambulating 75 ft. with RW and min A, dressing with min assist       Constitutional:    [   ] WNL           [   ] poor appetite   [   ] insomnia   [   ] tired   [ x] weak   Cardio:                [ x  ] WNL           [   ] CP   [   ] SY   [   ] palpitations               Resp:                   [ x  ] WNL           [   ] SOB   [   ] cough   [   ] wheezing   GI:                        [  x ] WNL           [   ] constipation   [   ] diarrhea   [   ] abdominal pain   [   ] nausea   [   ] emesis                                :                      [   x] WNL           [   ] LAM  [   ] dysuria   [   ] difficulty voiding             Endo:                   [  x ] WNL          [   ] polyuria   [   ] temperature intolerance                 Skin:                     [  x ] WNL          [   ] pain   [   ] wound   [   ] rash   MSK:                    [  x ] WNL          [   ] muscle pain   [   ] joint pain/ stiffness   [   ] muscle tenderness   [   ] swelling   Neuro:                 [ x  ] WNL          [   ] HA   [   ] change in vision   [   ] tremor   [   ] weakness   [   ]dysphagia              Cognitive:           [   ] WNL           [ x  ]confusion at times - feels 'off'   Psych:                  [   ] WNL           [   ] hallucinations   [   ]agitation   [   ] delusion   [ x  ]depression/ anxiety    PHYSICAL EXAM    Vital Signs Last 24 Hrs  T(C): 36.7 (30 Jan 2025 12:28), Max: 36.9 (30 Jan 2025 05:27)  T(F): 98 (30 Jan 2025 12:28), Max: 98.5 (30 Jan 2025 05:27)  HR: 88 (30 Jan 2025 12:28) (73 - 91)  BP: 124/82 (30 Jan 2025 12:28) (124/82 - 147/91)  BP(mean): 101 (30 Jan 2025 05:27) (101 - 109)  RR: 18 (30 Jan 2025 12:28) (18 - 18)  SpO2: 98% (30 Jan 2025 12:28) (97% - 98%)    Parameters below as of 30 Jan 2025 12:28  Patient On (Oxygen Delivery Method): room air      General:[x   ] NAD, Resting Comfortable,   [   ] other:                                HEENT: [ x  ] NC/AT, EOMI, PERRL , Normal Conjunctivae,   [   ] other:  Cardio: [  x ] RRR, no murmur   [   ] other:                              Pulm: [  x ] No Respiratory Distress,  Lungs CTAB,   [   ] other:                       Abdomen: [x   ]ND/NT, Soft,   [   ] other:    : [ x  ] NO LAM CATHETER, [   ] LAM CATHETER- no meatal tear, no discharge, [   ] other:                                            MSK: [x   ] No joint swelling, Full ROM,   [   ] other:                                         Ext: [  x ]No C/C/E, No calf tenderness,   [   ]other:    Skin: [ x  ]intact,   [   ] other:                                                                   Neurological Examination:  Cognitive: [    ] AAO x 3,   [ x   ]  other: AOx2 - not place                                                                     Attention:  [  x  ] intact,   [  ]  other:                         Memory: [    ] intact,    [ x   ]  other:   2/3 delayed recall with verbal cues  Mood/Affect: [  x  ] wnl,    [    ]  other:                                                                             Communication: [    ]Fluent, no dysarthria, following commands:  [   x ] other: mild dysarthria, expressive aphasia, mild anomia, hypophonia   CN II - XII:  [    ] intact,  [ x   ] other: R facial droop                                                                                        Motor:   RIGHT UE: [  x ] WNL,  [   ] other:  LEFT    UE: [  x ] WNL,  [   ] other:  RIGHT LE: [ x  ] WNL,  [   ] other:   LEFT    LE: [x   ] WNL,  [   ] other:    Tone: [  x ] wnl,   [    ]  other:  DTRs: [   ]symmetric, [ x  ] other: R patellar reflex brisk 3+  Coordination:   [x    ] intact,   [    ] other:                                                                           Sensory: [   x ] Intact to light touch,   [    ] other:    MEDICATIONS  (STANDING):  amLODIPine   Tablet 5 milliGRAM(s) Oral daily  aspirin  chewable 81 milliGRAM(s) Oral daily  atorvastatin 80 milliGRAM(s) Oral at bedtime  carbidopa/levodopa  25/100 2 Tablet(s) Oral three times a day  carbidopa/levodopa CR 50/200 1 Tablet(s) Oral <User Schedule>  carvedilol 3.125 milliGRAM(s) Oral every 12 hours  chlorhexidine 2% Cloths 1 Application(s) Topical <User Schedule>  clopidogrel Tablet 75 milliGRAM(s) Oral daily  dextrose 5%. 1000 milliLiter(s) (100 mL/Hr) IV Continuous <Continuous>  dextrose 5%. 1000 milliLiter(s) (50 mL/Hr) IV Continuous <Continuous>  dextrose 50% Injectable 25 Gram(s) IV Push once  enoxaparin Injectable 40 milliGRAM(s) SubCutaneous every 24 hours  entacapone 200 milliGRAM(s) Oral three times a day  glucagon  Injectable 1 milliGRAM(s) IntraMuscular once  insulin lispro (ADMELOG) corrective regimen sliding scale   SubCutaneous three times a day before meals  levETIRAcetam 500 milliGRAM(s) Oral two times a day  mirtazapine 7.5 milliGRAM(s) Oral daily  polyethylene glycol 3350 17 Gram(s) Oral daily  rivastigmine patch  9.5 mG/24 Hr(s). 1 Patch Transdermal every 24 hours  senna 2 Tablet(s) Oral at bedtime  traZODone 50 milliGRAM(s) Oral at bedtime    MEDICATIONS  (PRN):  acetaminophen     Tablet .. 650 milliGRAM(s) Oral every 6 hours PRN Temp greater or equal to 38C (100.4F), Mild Pain (1 - 3)  aluminum hydroxide/magnesium hydroxide/simethicone Suspension 30 milliLiter(s) Oral every 4 hours PRN Dyspepsia  dextrose Oral Gel 15 Gram(s) Oral once PRN Blood Glucose LESS THAN 70 milliGRAM(s)/deciliter  magnesium hydroxide Suspension 30 milliLiter(s) Oral daily PRN Constipation  melatonin 3 milliGRAM(s) Oral at bedtime PRN Insomnia          RECENT LABS/IMAGING                          15.9   8.97  )-----------( 151      ( 27 Jan 2025 05:49 )             46.3     01-27    139  |  104  |  19  ----------------------------<  70  4.2   |  25  |  1.0    Ca    8.6      27 Jan 2025 05:49  Mg     2.0     01-27    TPro  5.8[L]  /  Alb  3.6  /  TBili  0.9  /  DBili  x   /  AST  15  /  ALT  <5  /  AlkPhos  87  01-27      CAPILLARY BLOOD GLUCOSE    POCT Blood Glucose.: 165 mg/dL (30 Jan 2025 11:28)  POCT Blood Glucose.: 88 mg/dL (30 Jan 2025 07:33)  POCT Blood Glucose.: 119 mg/dL (29 Jan 2025 21:01)  POCT Blood Glucose.: 93 mg/dL (29 Jan 2025 16:25)    POCT Blood Glucose.: 172 mg/dL (29 Jan 2025 11:02)  POCT Blood Glucose.: 89 mg/dL (29 Jan 2025 07:26)  POCT Blood Glucose.: 172 mg/dL (28 Jan 2025 21:34)  POCT Blood Glucose.: 123 mg/dL (28 Jan 2025 16:10)    POCT Blood Glucose.: 107 mg/dL (01-27-25 @ 11:52)  POCT Blood Glucose.: 72 mg/dL (01-27-25 @ 07:42)  POCT Blood Glucose.: 103 mg/dL (01-26-25 @ 20:34)  POCT Blood Glucose.: 116 mg/dL (01-26-25 @ 16:49)  POCT Blood Glucose.: 118 mg/dL (01-26-25 @ 11:52)  POCT Blood Glucose.: 90 mg/dL (01-26-25 @ 07:31)  POCT Blood Glucose.: 122 mg/dL (01-25-25 @ 21:09)  POCT Blood Glucose.: 105 mg/dL (01-25-25 @ 16:27)  POCT Blood Glucose.: 206 mg/dL (01-25-25 @ 11:39)  POCT Blood Glucose.: 82 mg/dL (01-25-25 @ 07:53)  POCT Blood Glucose.: 94 mg/dL (01-24-25 @ 21:20)  POCT Blood Glucose.: 104 mg/dL (01-24-25 @ 16:37)

## 2025-01-30 NOTE — PROGRESS NOTE ADULT - ASSESSMENT
74M PMH of Parkinson's, HTN, hx ischemic CVA x2, type 2 DM, dementia and chronic back pain, Hx of seizures on keppra BIBA from [place] facility  or generalized weakness, confusion, decreased PO intake and multiple witnessed falls over the past few days. He had two witnessed falls the day prior to admission where he was weak and slumped to the ground. No LOC, not on AC. Per staff, he is normally alert and oriented x 3 and able to perform ADLs, but for the past few days has been increasingly confused. He was evaluated in the ED the day prior for similar complaints, had a negative work up and was discharged. Infectious and trauma workup negative. On 1/21 patient was notes to have expressive aphasia and R facial droop. Evaluated by neurology, MRI ordered and showed acute stroke in the L corona radiata. CTA Head/Neck showed severe stenosis left MCA proximal M2. Moderate narrowing right proximal vertebral artery On 1/22 new onset L tongue deviation and mild dysarthria - repeat MRI brain with no new acute findings. ILR placed 1/24. NIHSS 4 on admission.    #Rehab of gait abnormality, dysarthria and expressive aphasia iso acute L corona radiata stroke  -Imaging as above  -s/p ILR 1/24  -s/p  EEG 1/22: generalized slowing, no seizures  - A1C 4.9, , TSH 2.81  -c/w ASA and plavix for 90 days  -c/w lipitor 80mg qhs  -PT/OT/SLP/Neuropsych eval and treat   - mobility and ADLs improving    #HTN  -c/w amlodipine 5mg daily  -c/w coreg 3.125mg q12h  -Monitor BP     #HLD  -c/w lipitor 80mg qhs    #DM2  - A1C with Estimated Average Glucose Result: 4.9: Method: Immunoassay - well controlled  - on low dose gabapentin at night. No c/o of pain or dysesthesias. Will d/c and monitor.    #Hx of seizures  -c/w keppra 500 bid    #Hx of Parkinson’s  #Hx of dementia. Mild and does not interfere with patient's ability to participate in and benefit from acute rehab.  -c/w home meds sinemet, entacapone, and rivastigmine     #Anxiety/depression  #Insomnia  -c/w mirtazapine 7.5 daily  -c/w trazadone 100mg qhs - given increased confusion, decreased to 50mg hs and monitor  -c/w melatonin 3mg qhs prn     -Pain control: tylenol prn    -GI/Bowel Mgmt: senna/miralax    - Diet: Easy to chew with thins DASH/carb consistent           Precautions / PROPHYLAXIS:      - Falls      - DVT prophylaxis: Lovenox

## 2025-01-31 LAB
GLUCOSE BLDC GLUCOMTR-MCNC: 102 MG/DL — HIGH (ref 70–99)
GLUCOSE BLDC GLUCOMTR-MCNC: 123 MG/DL — HIGH (ref 70–99)
GLUCOSE BLDC GLUCOMTR-MCNC: 198 MG/DL — HIGH (ref 70–99)
GLUCOSE BLDC GLUCOMTR-MCNC: 85 MG/DL — SIGNIFICANT CHANGE UP (ref 70–99)

## 2025-01-31 RX ADMIN — LEVETIRACETAM 500 MILLIGRAM(S): 750 TABLET, FILM COATED ORAL at 05:55

## 2025-01-31 RX ADMIN — Medication 50 MILLIGRAM(S): at 21:02

## 2025-01-31 RX ADMIN — ENTACAPONE 200 MILLIGRAM(S): 200 TABLET, FILM COATED ORAL at 21:03

## 2025-01-31 RX ADMIN — Medication 3.12 MILLIGRAM(S): at 05:55

## 2025-01-31 RX ADMIN — POLYETHYLENE GLYCOL 3350 17 GRAM(S): 17 POWDER, FOR SOLUTION ORAL at 17:05

## 2025-01-31 RX ADMIN — LEVETIRACETAM 500 MILLIGRAM(S): 750 TABLET, FILM COATED ORAL at 17:01

## 2025-01-31 RX ADMIN — Medication 2 TABLET(S): at 13:20

## 2025-01-31 RX ADMIN — Medication 1: at 12:34

## 2025-01-31 RX ADMIN — Medication 2 TABLET(S): at 21:03

## 2025-01-31 RX ADMIN — Medication 2 TABLET(S): at 21:02

## 2025-01-31 RX ADMIN — ASPIRIN 81 MILLIGRAM(S): 81 TABLET, COATED ORAL at 13:19

## 2025-01-31 RX ADMIN — ACETAMINOPHEN 650 MILLIGRAM(S): 160 SUSPENSION ORAL at 11:30

## 2025-01-31 RX ADMIN — ENOXAPARIN SODIUM 40 MILLIGRAM(S): 100 INJECTION SUBCUTANEOUS at 05:56

## 2025-01-31 RX ADMIN — ENTACAPONE 200 MILLIGRAM(S): 200 TABLET, FILM COATED ORAL at 06:04

## 2025-01-31 RX ADMIN — RIVASTIGMINE 1 PATCH: 4.6 PATCH, EXTENDED RELEASE TRANSDERMAL at 07:46

## 2025-01-31 RX ADMIN — ATORVASTATIN CALCIUM 80 MILLIGRAM(S): 80 TABLET, FILM COATED ORAL at 21:02

## 2025-01-31 RX ADMIN — RIVASTIGMINE 1 PATCH: 4.6 PATCH, EXTENDED RELEASE TRANSDERMAL at 05:56

## 2025-01-31 RX ADMIN — Medication 3.12 MILLIGRAM(S): at 17:01

## 2025-01-31 RX ADMIN — ANTISEPTIC SURGICAL SCRUB 1 APPLICATION(S): 0.04 SOLUTION TOPICAL at 05:55

## 2025-01-31 RX ADMIN — Medication 2 TABLET(S): at 05:55

## 2025-01-31 RX ADMIN — MIRTAZAPINE 7.5 MILLIGRAM(S): 30 TABLET, FILM COATED ORAL at 13:19

## 2025-01-31 RX ADMIN — Medication 1 TABLET(S): at 17:01

## 2025-01-31 RX ADMIN — Medication 5 MILLIGRAM(S): at 05:55

## 2025-01-31 RX ADMIN — Medication 75 MILLIGRAM(S): at 13:19

## 2025-01-31 RX ADMIN — ENTACAPONE 200 MILLIGRAM(S): 200 TABLET, FILM COATED ORAL at 13:19

## 2025-01-31 RX ADMIN — ACETAMINOPHEN 650 MILLIGRAM(S): 160 SUSPENSION ORAL at 10:46

## 2025-01-31 NOTE — PROGRESS NOTE ADULT - SUBJECTIVE AND OBJECTIVE BOX
Patient is a 74y old  Male who presents with a chief complaint of acute stroke and Parkinson's Syndrome with decline in function (24 Jan 2025 13:28)      HPI:    74M PMH of Parkinson's, HTN, hx ischemic CVA x2, type 2 DM, dementia and chronic back pain, Hx of seizures on keppra BIBA from [place] facility  or generalized weakness, confusion, decreased PO intake and multiple witnessed falls over the past few days. He had two witnessed falls the day prior to admission where he was weak and slumped to the ground. No LOC, not on AC. Per staff, he is normally alert and oriented x 3 and able to perform ADLs, but for the past few days has been increasingly confused. He was evaluated in the ED the day prior for similar complaints, had a negative work up and was discharged. Infectious and trauma workup negative. On 1/21 patient was notes to have expressive aphasia and R facial droop. Evaluated by neurology, MRI ordered and showed acute stroke in the L corona radiata. CTA Head/Neck showed severe stenosis left MCA proximal M2. Moderate narrowing right proximal vertebral arter On 1/22 new onset L tongue deviation and mild dysarthria - repeat MRI brain with no new acute findings. ILR placed 1/24.    Prior to admission, the patient was independent in ADLs and ambulation without an assistive device Patient now requires Reza for bed mobility, modA for transfers, ambulating 40’x2 with RW and modA,  modA for LBD. Therefore, there is a significant functional decline from baseline. Patients also with medical comorbidities of DM, HTN, requiring medication management and monitoring of vitals by Physiatry team and nursing. To return home it is in the patient’s best interest to have acute inpatient rehabilitative services to undergo intensive interdisciplinary therapy. Furthermore, the patient is motivated and is able to tolerate up to 3 hours of daily therapy for 5-6 days a week for a total of 15 hours a week. Evaluated by Physiatry and deemed to be an excellent candidate for admission to  for acute inpatient rehab. Admitted to Acute Rehab on 1/24/25    LS: Lives alone in a private home, 6STE  PLOF: independent in ADLs and ambulation with no AS     (24 Jan 2025 13:28)    TODAY'S SUBJECTIVE & REVIEW OF SYMPTOMS:    Neuro stable. No acute complaints. Vitals and labs reviewed. Tolerating therapy. Feeling stronger. More verbal.    CLOF: Reza for bed mobility, min A for transfers, ambulating 75 ft. with RW and min A, dressing with min assist       Constitutional:    [   ] WNL           [   ] poor appetite   [   ] insomnia   [   ] tired   [ x] weak   Cardio:                [ x  ] WNL           [   ] CP   [   ] SY   [   ] palpitations               Resp:                   [ x  ] WNL           [   ] SOB   [   ] cough   [   ] wheezing   GI:                        [  x ] WNL           [   ] constipation   [   ] diarrhea   [   ] abdominal pain   [   ] nausea   [   ] emesis                                :                      [   x] WNL           [   ] LAM  [   ] dysuria   [   ] difficulty voiding             Endo:                   [  x ] WNL          [   ] polyuria   [   ] temperature intolerance                 Skin:                     [  x ] WNL          [   ] pain   [   ] wound   [   ] rash   MSK:                    [  x ] WNL          [   ] muscle pain   [   ] joint pain/ stiffness   [   ] muscle tenderness   [   ] swelling   Neuro:                 [ x  ] WNL          [   ] HA   [   ] change in vision   [   ] tremor   [   ] weakness   [   ]dysphagia              Cognitive:           [   ] WNL           [ x  ]confusion at times - feels 'off'   Psych:                  [   ] WNL           [   ] hallucinations   [   ]agitation   [   ] delusion   [ x  ]depression/ anxiety    PHYSICAL EXAM    Vital Signs Last 24 Hrs  T(C): 36.7 (31 Jan 2025 05:54), Max: 36.7 (30 Jan 2025 20:10)  T(F): 98 (31 Jan 2025 05:54), Max: 98.1 (30 Jan 2025 20:10)  HR: 71 (31 Jan 2025 05:54) (71 - 80)  BP: 130/81 (31 Jan 2025 05:54) (130/81 - 138/88)  BP(mean): 105 (30 Jan 2025 20:10) (105 - 105)  RR: 18 (31 Jan 2025 05:54) (18 - 18)  SpO2: 97% (31 Jan 2025 05:54) (97% - 98%)      General:[x   ] NAD, Resting Comfortable,   [   ] other:                                HEENT: [ x  ] NC/AT, EOMI, PERRL , Normal Conjunctivae,   [   ] other:  Cardio: [  x ] RRR, no murmur   [   ] other:                              Pulm: [  x ] No Respiratory Distress,  Lungs CTAB,   [   ] other:                       Abdomen: [x   ]ND/NT, Soft,   [   ] other:    : [ x  ] NO LAM CATHETER, [   ] LAM CATHETER- no meatal tear, no discharge, [   ] other:                                            MSK: [x   ] No joint swelling, Full ROM,   [   ] other:                                         Ext: [  x ]No C/C/E, No calf tenderness,   [   ]other:    Skin: [ x  ]intact,   [   ] other:                                                                   Neurological Examination:  Cognitive: [    ] AAO x 3,   [ x   ]  other: AOx2 - not place                                                                     Attention:  [  x  ] intact,   [  ]  other:                         Memory: [    ] intact,    [ x   ]  other:   2/3 delayed recall with verbal cues  Mood/Affect: [  x  ] wnl,    [    ]  other:                                                                             Communication: [    ]Fluent, no dysarthria, following commands:  [   x ] other: mild dysarthria, expressive aphasia, mild anomia, hypophonia   CN II - XII:  [    ] intact,  [ x   ] other: R facial droop                                                                                        Motor:   RIGHT UE: [   ] WNL,  [x   ] other: 4/5  LEFT    UE: [  x ] WNL,  [   ] other:  RIGHT LE: [   ] WNL,  [x   ] other: 4/5  LEFT    LE: [x   ] WNL,  [   ] other:    Tone: [  x ] wnl,   [    ]  other:  DTRs: [   ]symmetric, [ x  ] other: R patellar reflex brisk 3+  Coordination:   [x    ] intact,   [    ] other:                                                                           Sensory: [   x ] Intact to light touch,   [    ] other:    MEDICATIONS  (STANDING):  amLODIPine   Tablet 5 milliGRAM(s) Oral daily  aspirin  chewable 81 milliGRAM(s) Oral daily  atorvastatin 80 milliGRAM(s) Oral at bedtime  carbidopa/levodopa  25/100 2 Tablet(s) Oral three times a day  carbidopa/levodopa CR 50/200 1 Tablet(s) Oral <User Schedule>  carvedilol 3.125 milliGRAM(s) Oral every 12 hours  chlorhexidine 2% Cloths 1 Application(s) Topical <User Schedule>  clopidogrel Tablet 75 milliGRAM(s) Oral daily  dextrose 5%. 1000 milliLiter(s) (100 mL/Hr) IV Continuous <Continuous>  dextrose 5%. 1000 milliLiter(s) (50 mL/Hr) IV Continuous <Continuous>  dextrose 50% Injectable 25 Gram(s) IV Push once  enoxaparin Injectable 40 milliGRAM(s) SubCutaneous every 24 hours  entacapone 200 milliGRAM(s) Oral three times a day  glucagon  Injectable 1 milliGRAM(s) IntraMuscular once  insulin lispro (ADMELOG) corrective regimen sliding scale   SubCutaneous three times a day before meals  levETIRAcetam 500 milliGRAM(s) Oral two times a day  mirtazapine 7.5 milliGRAM(s) Oral daily  polyethylene glycol 3350 17 Gram(s) Oral daily  rivastigmine patch  9.5 mG/24 Hr(s). 1 Patch Transdermal every 24 hours  senna 2 Tablet(s) Oral at bedtime  traZODone 50 milliGRAM(s) Oral at bedtime    MEDICATIONS  (PRN):  acetaminophen     Tablet .. 650 milliGRAM(s) Oral every 6 hours PRN Temp greater or equal to 38C (100.4F), Mild Pain (1 - 3)  aluminum hydroxide/magnesium hydroxide/simethicone Suspension 30 milliLiter(s) Oral every 4 hours PRN Dyspepsia  dextrose Oral Gel 15 Gram(s) Oral once PRN Blood Glucose LESS THAN 70 milliGRAM(s)/deciliter  magnesium hydroxide Suspension 30 milliLiter(s) Oral daily PRN Constipation  melatonin 3 milliGRAM(s) Oral at bedtime PRN Insomnia            RECENT LABS/IMAGING    POCT Blood Glucose.: 198 mg/dL (01-31-25 @ 10:54)  POCT Blood Glucose.: 85 mg/dL (01-31-25 @ 07:39)  POCT Blood Glucose.: 222 mg/dL (01-30-25 @ 20:20)  POCT Blood Glucose.: 75 mg/dL (01-30-25 @ 16:47)  POCT Blood Glucose.: 65 mg/dL (01-30-25 @ 16:27)  POCT Blood Glucose.: 165 mg/dL (01-30-25 @ 11:28)  POCT Blood Glucose.: 88 mg/dL (01-30-25 @ 07:33)  POCT Blood Glucose.: 119 mg/dL (01-29-25 @ 21:01)  POCT Blood Glucose.: 93 mg/dL (01-29-25 @ 16:25)  POCT Blood Glucose.: 172 mg/dL (01-29-25 @ 11:02)  POCT Blood Glucose.: 89 mg/dL (01-29-25 @ 07:26)  POCT Blood Glucose.: 172 mg/dL (01-28-25 @ 21:34)                            15.9   8.97  )-----------( 151      ( 27 Jan 2025 05:49 )             46.3     01-27    139  |  104  |  19  ----------------------------<  70  4.2   |  25  |  1.0    Ca    8.6      27 Jan 2025 05:49  Mg     2.0     01-27    TPro  5.8[L]  /  Alb  3.6  /  TBili  0.9  /  DBili  x   /  AST  15  /  ALT  <5  /  AlkPhos  87  01-27

## 2025-01-31 NOTE — PROGRESS NOTE ADULT - ASSESSMENT
74M PMH of Parkinson's, HTN, hx ischemic CVA x2, type 2 DM, dementia and chronic back pain, Hx of seizures on keppra BIBA from [place] facility  or generalized weakness, confusion, decreased PO intake and multiple witnessed falls over the past few days. He had two witnessed falls the day prior to admission where he was weak and slumped to the ground. No LOC, not on AC. Per staff, he is normally alert and oriented x 3 and able to perform ADLs, but for the past few days has been increasingly confused. He was evaluated in the ED the day prior for similar complaints, had a negative work up and was discharged. Infectious and trauma workup negative. On 1/21 patient was notes to have expressive aphasia and R facial droop. Evaluated by neurology, MRI ordered and showed acute stroke in the L corona radiata. CTA Head/Neck showed severe stenosis left MCA proximal M2. Moderate narrowing right proximal vertebral artery On 1/22 new onset L tongue deviation and mild dysarthria - repeat MRI brain with no new acute findings. ILR placed 1/24. NIHSS 4 on admission.    #Rehab of gait abnormality, dysarthria and expressive aphasia iso acute L corona radiata stroke  -Imaging as above  -s/p ILR 1/24  -s/p  EEG 1/22: generalized slowing, no seizures  - A1C 4.9, , TSH 2.81  -c/w ASA and plavix for 90 days  -c/w lipitor 80mg qhs  -PT/OT/SLP/Neuropsych eval and treat   - mobility and ADLs and cognition improving    #HTN  -c/w amlodipine 5mg daily  -c/w coreg 3.125mg q12h  -Monitor BP     #HLD  -c/w lipitor 80mg qhs    #DM2  - A1C with Estimated Average Glucose Result: 4.9: Method: Immunoassay - well controlled  - on low dose gabapentin at night. No c/o of pain or dysesthesias. Will d/c and monitor.    #Hx of seizures  -c/w keppra 500 bid    #Hx of Parkinson’s  #Hx of dementia. Mild and does not interfere with patient's ability to participate in and benefit from acute rehab.  -c/w home meds sinemet, entacapone, and rivastigmine     #Anxiety/depression  #Insomnia  -c/w mirtazapine 7.5 daily  -c/w trazadone 100mg qhs - given increased confusion, decreased to 50mg hs and monitor  -c/w melatonin 3mg qhs prn  - sleeping well and more alert during the day     -Pain control: tylenol prn    -GI/Bowel Mgmt: senna/miralax    - Diet: Easy to chew with thins DASH/carb consistent           Precautions / PROPHYLAXIS:      - Falls      - DVT prophylaxis: Lovenox

## 2025-02-01 LAB
GLUCOSE BLDC GLUCOMTR-MCNC: 155 MG/DL — HIGH (ref 70–99)
GLUCOSE BLDC GLUCOMTR-MCNC: 285 MG/DL — HIGH (ref 70–99)
GLUCOSE BLDC GLUCOMTR-MCNC: 95 MG/DL — SIGNIFICANT CHANGE UP (ref 70–99)
GLUCOSE BLDC GLUCOMTR-MCNC: 99 MG/DL — SIGNIFICANT CHANGE UP (ref 70–99)

## 2025-02-01 RX ADMIN — RIVASTIGMINE 1 PATCH: 4.6 PATCH, EXTENDED RELEASE TRANSDERMAL at 05:43

## 2025-02-01 RX ADMIN — ANTISEPTIC SURGICAL SCRUB 1 APPLICATION(S): 0.04 SOLUTION TOPICAL at 05:42

## 2025-02-01 RX ADMIN — Medication 50 MILLIGRAM(S): at 21:11

## 2025-02-01 RX ADMIN — ASPIRIN 81 MILLIGRAM(S): 81 TABLET, COATED ORAL at 13:15

## 2025-02-01 RX ADMIN — Medication 5 MILLIGRAM(S): at 05:42

## 2025-02-01 RX ADMIN — MIRTAZAPINE 7.5 MILLIGRAM(S): 30 TABLET, FILM COATED ORAL at 13:17

## 2025-02-01 RX ADMIN — ENOXAPARIN SODIUM 40 MILLIGRAM(S): 100 INJECTION SUBCUTANEOUS at 05:42

## 2025-02-01 RX ADMIN — LEVETIRACETAM 500 MILLIGRAM(S): 750 TABLET, FILM COATED ORAL at 19:33

## 2025-02-01 RX ADMIN — Medication 3.12 MILLIGRAM(S): at 19:36

## 2025-02-01 RX ADMIN — ENTACAPONE 200 MILLIGRAM(S): 200 TABLET, FILM COATED ORAL at 21:11

## 2025-02-01 RX ADMIN — Medication 75 MILLIGRAM(S): at 13:16

## 2025-02-01 RX ADMIN — ATORVASTATIN CALCIUM 80 MILLIGRAM(S): 80 TABLET, FILM COATED ORAL at 21:11

## 2025-02-01 RX ADMIN — Medication 3.12 MILLIGRAM(S): at 05:42

## 2025-02-01 RX ADMIN — ENTACAPONE 200 MILLIGRAM(S): 200 TABLET, FILM COATED ORAL at 05:42

## 2025-02-01 RX ADMIN — Medication 2 TABLET(S): at 21:10

## 2025-02-01 RX ADMIN — Medication 2 TABLET(S): at 13:16

## 2025-02-01 RX ADMIN — Medication 1 TABLET(S): at 19:34

## 2025-02-01 RX ADMIN — LEVETIRACETAM 500 MILLIGRAM(S): 750 TABLET, FILM COATED ORAL at 05:42

## 2025-02-01 RX ADMIN — Medication 2 TABLET(S): at 05:42

## 2025-02-01 NOTE — PROGRESS NOTE ADULT - REASON FOR ADMISSION
acute stroke and Parkinson's Syndrome with decline in function Rehab for acute stroke and Parkinson's Syndrome with decline in function

## 2025-02-01 NOTE — PROGRESS NOTE ADULT - ASSESSMENT
74M PMH of Parkinson's, HTN, hx ischemic CVA x2, type 2 DM, dementia and chronic back pain, Hx of seizures on keppra BIBA from [place] facility  or generalized weakness, confusion, decreased PO intake and multiple witnessed falls over the past few days. He had two witnessed falls the day prior to admission where he was weak and slumped to the ground. No LOC, not on AC. Per staff, he is normally alert and oriented x 3 and able to perform ADLs, but for the past few days has been increasingly confused. He was evaluated in the ED the day prior for similar complaints, had a negative work up and was discharged. Infectious and trauma workup negative. On 1/21 patient was notes to have expressive aphasia and R facial droop. Evaluated by neurology, MRI ordered and showed acute stroke in the L corona radiata. CTA Head/Neck showed severe stenosis left MCA proximal M2. Moderate narrowing right proximal vertebral artery On 1/22 new onset L tongue deviation and mild dysarthria - repeat MRI brain with no new acute findings. ILR placed 1/24. NIHSS 4 on admission.    #Rehab of gait abnormality, dysarthria and expressive aphasia iso acute L corona radiata stroke  -Imaging as above  -s/p ILR 1/24  -s/p  EEG 1/22: generalized slowing, no seizures  - A1C 4.9, , TSH 2.81  -c/w ASA and plavix for 90 days  -c/w lipitor 80mg qhs  -PT/OT/SLP/Neuropsych eval and treat   - mobility and ADLs and cognition improving    #HTN  -c/w amlodipine 5mg daily  -c/w coreg 3.125mg q12h  -Monitor BP     #HLD  -c/w lipitor 80mg qhs    #DM2  - A1C with Estimated Average Glucose Result: 4.9: Method: Immunoassay - well controlled  - on low dose gabapentin at night. No c/o of pain or dysesthesias. Will d/c and monitor.    #Hx of seizures  -c/w keppra 500 bid    #Hx of Parkinson’s  #Hx of dementia. Mild and does not interfere with patient's ability to participate in and benefit from acute rehab.  -c/w home meds sinemet, entacapone, and rivastigmine     #Anxiety/depression  #Insomnia  -c/w mirtazapine 7.5 daily  -c/w trazadone 100mg qhs - given increased confusion, decreased to 50mg hs and monitor  -c/w melatonin 3mg qhs prn  - sleeping well and more alert during the day     -Pain control: tylenol prn    -GI/Bowel Mgmt: senna/miralax    - Diet: Easy to chew with thins DASH/carb consistent           Precautions / PROPHYLAXIS:      - Falls      - DVT prophylaxis: Lovenox     74 M PMH of Parkinson's, HTN, hx ischemic CVA x2, type 2 DM, dementia and chronic back pain, Hx of seizures on keppra BIBA from [place] facility  or generalized weakness, confusion, decreased PO intake and multiple witnessed falls over the past few days. He had two witnessed falls the day prior to admission where he was weak and slumped to the ground. No LOC, not on AC. Per staff, he is normally alert and oriented x 3 and able to perform ADLs, but for the past few days has been increasingly confused. He was evaluated in the ED the day prior for similar complaints, had a negative work up and was discharged. Infectious and trauma workup negative. On 1/21 patient was notes to have expressive aphasia and R facial droop. Evaluated by neurology, MRI ordered and showed acute stroke in the L corona radiata. CTA Head/Neck showed severe stenosis left MCA proximal M2. Moderate narrowing right proximal vertebral artery On 1/22 new onset L tongue deviation and mild dysarthria - repeat MRI brain with no new acute findings. ILR placed 1/24. NIHSS 4 on admission.    #Rehab for gait abnormality, dysarthria and expressive aphasia iso acute L corona radiata stroke  -Imaging as above  -s/p ILR 1/24  -s/p  EEG 1/22: generalized slowing, no seizures  - A1C 4.9, , TSH 2.81  -c/w ASA and Plavix for 90 days  -c/w Lipitor 80 mg qhs  -PT/OT/SLP/Neuropsych eval and treat   - mobility and ADLs and cognition improving    #HTN  -c/w amlodipine 5 mg daily  -c/w Coreg 3.125 mg q12h  -Monitor BP     #HLD  -c/w Lipitor 80 mg qhs    #DM2  - A1C with Estimated Average Glucose Result: 4.9: Method: Immunoassay - well controlled  - on low dose gabapentin at night. No c/o of pain or dysesthesias. Will d/c and monitor.    #Hx of seizures  -c/w Keppra 500 bid    #Hx of Parkinson’s  #Hx of dementia. Mild and does not interfere with patient's ability to participate in and benefit from acute rehab.  -c/w home meds sinemet, entacapone, and rivastigmine     #Anxiety/depression  #Insomnia  -c/w mirtazapine 7.5 daily  -c/w trazadone 100mg qhs - given increased confusion, decreased to 50 mg hs and monitor  -c/w melatonin 3 mg qhs prn  - sleeping well and more alert during the day     -Pain control: tylenol prn    -GI/Bowel Mgmt: senna/Miralax    - Diet: Easy to chew with thins DASH/carb consistent           Precautions / PROPHYLAXIS:      - Falls      - DVT prophylaxis: Lovenox

## 2025-02-01 NOTE — PROGRESS NOTE ADULT - ATTENDING COMMENTS
I reviewed the chart and examined the patient with the rehab resident and we discussed the findings and treatment plan.  The patient is tolerating the rehab program well. I agree with the findings and treatment plan above, which I modified as indicated. The patient requires 3 hrs a day of acute inpatient rehab. Continue full rehab program.    I read, edited and agree with the physical exam above and assessment:    74 M PMH of Parkinson's, HTN, hx ischemic CVA x2, type 2 DM, dementia and chronic back pain, Hx of seizures on keppra BIBA from [facility]  or generalized weakness, confusion, decreased PO intake and multiple witnessed falls over the past few days. He had two witnessed falls the day prior to admission where he was weak and slumped to the ground. No LOC, not on AC. Per staff, he is normally alert and oriented x 3 and able to perform ADLs, but for the past few days has been increasingly confused. He was evaluated in the ED the day prior for similar complaints, had a negative work up and was discharged. Infectious and trauma workup negative. On 1/21 patient was notes to have expressive aphasia and R facial droop. Evaluated by neurology, MRI ordered and showed acute stroke in the L corona radiata. CTA Head/Neck showed severe stenosis left MCA proximal M2. Moderate narrowing right proximal vertebral artery On 1/22 new onset L tongue deviation and mild dysarthria - repeat MRI brain with no new acute findings. ILR placed 1/24. NIHSS 4 on admission.    #Rehab for gait abnormality, dysarthria and expressive aphasia iso acute L corona radiata stroke  -Imaging as above  -s/p ILR 1/24  -s/p  EEG 1/22: generalized slowing, no seizures  - A1C 4.9, , TSH 2.81  -c/w ASA and Plavix for 90 days  -c/w Lipitor 80 mg qhs  -PT/OT/SLP/Neuropsych eval and treat   - mobility and ADLs and cognition improving    #HTN  -c/w amlodipine 5 mg daily  -c/w Coreg 3.125 mg q12h  -Monitor BP     #HLD  -c/w Lipitor 80 mg qhs    #DM2  - A1C with Estimated Average Glucose Result: 4.9: Method: Immunoassay - well controlled  - on low dose gabapentin at night. No c/o of pain or dysesthesias. Will d/c and monitor.    #Hx of seizures  -c/w Keppra 500 bid    #Hx of Parkinson’s  #Hx of dementia. Mild and does not interfere with patient's ability to participate in and benefit from acute rehab.  -c/w home meds sinemet, entacapone, and rivastigmine     #Anxiety/depression  #Insomnia  -c/w mirtazapine 7.5 daily  -c/w trazadone 100mg qhs - given increased confusion, decreased to 50 mg hs and monitor  -c/w melatonin 3 mg qhs prn  - sleeping well and more alert during the day     -Pain control: Tylenol prn    -GI/Bowel Mgmt: senna/Miralax    - Diet: Easy to chew with thins DASH/carb consistent           Precautions / PROPHYLAXIS:      - Falls      - DVT prophylaxis: Lovenox

## 2025-02-01 NOTE — PROGRESS NOTE ADULT - SUBJECTIVE AND OBJECTIVE BOX
Patient is a 74y old  Male who presents with a chief complaint of acute stroke and Parkinson's Syndrome with decline in function (24 Jan 2025 13:28)      HPI:    74M PMH of Parkinson's, HTN, hx ischemic CVA x2, type 2 DM, dementia and chronic back pain, Hx of seizures on keppra BIBA from [facility]  or generalized weakness, confusion, decreased PO intake and multiple witnessed falls over the past few days. He had two witnessed falls the day prior to admission where he was weak and slumped to the ground. No LOC, not on AC. Per staff, he is normally alert and oriented x 3 and able to perform ADLs, but for the past few days has been increasingly confused. He was evaluated in the ED the day prior for similar complaints, had a negative work up and was discharged. Infectious and trauma workup negative. On 1/21 patient was notes to have expressive aphasia and R facial droop. Evaluated by neurology, MRI ordered and showed acute stroke in the L corona radiata. CTA Head/Neck showed severe stenosis left MCA proximal M2. Moderate narrowing right proximal vertebral arter On 1/22 new onset L tongue deviation and mild dysarthria - repeat MRI brain with no new acute findings. ILR placed 1/24.    Prior to admission, the patient was independent in ADLs and ambulation without an assistive device Patient now requires Reza for bed mobility, modA for transfers, ambulating 40’x2 with RW and modA,  modA for LBD. Therefore, there is a significant functional decline from baseline. Patients also with medical comorbidities of DM, HTN, requiring medication management and monitoring of vitals by Physiatry team and nursing. To return home it is in the patient’s best interest to have acute inpatient rehabilitative services to undergo intensive interdisciplinary therapy. Furthermore, the patient is motivated and is able to tolerate up to 3 hours of daily therapy for 5-6 days a week for a total of 15 hours a week. Evaluated by Physiatry and deemed to be an excellent candidate for admission to  for acute inpatient rehab. Admitted to Acute Rehab on 1/24/25    LS: Lives alone in a private home, 6STE  PLOF: independent in ADLs and ambulation with no AS     (24 Jan 2025 13:28)    TODAY'S SUBJECTIVE & REVIEW OF SYMPTOMS:  Patient seen at bedside. No acute events overnight. Doing well. Offers no complaints. Tolerating therapies. Vitals reviewed.    CLOF: Reza for bed mobility, min A for transfers, ambulating 75 ft. with RW and min A, dressing with min assist       Constitutional:    [   ] WNL           [   ] poor appetite   [   ] insomnia   [   ] tired   [ x] weak   Cardio:                [ x  ] WNL           [   ] CP   [   ] SY   [   ] palpitations               Resp:                   [ x  ] WNL           [   ] SOB   [   ] cough   [   ] wheezing   GI:                        [  x ] WNL           [   ] constipation   [   ] diarrhea   [   ] abdominal pain   [   ] nausea   [   ] emesis                                :                      [   x] WNL           [   ] LAM  [   ] dysuria   [   ] difficulty voiding             Endo:                   [  x ] WNL          [   ] polyuria   [   ] temperature intolerance                 Skin:                     [  x ] WNL          [   ] pain   [   ] wound   [   ] rash   MSK:                    [  x ] WNL          [   ] muscle pain   [   ] joint pain/ stiffness   [   ] muscle tenderness   [   ] swelling   Neuro:                 [ x  ] WNL          [   ] HA   [   ] change in vision   [   ] tremor   [   ] weakness   [   ]dysphagia              Cognitive:           [   ] WNL           [ x  ]confusion at times - feels 'off'   Psych:                  [   ] WNL           [   ] hallucinations   [   ]agitation   [   ] delusion   [ x  ]depression/ anxiety    PHYSICAL EXAM    Vital Signs Last 24 Hrs  T(C): 36.6 (01 Feb 2025 04:56), Max: 36.7 (31 Jan 2025 20:12)  T(F): 97.8 (01 Feb 2025 04:56), Max: 98 (31 Jan 2025 20:12)  HR: 63 (01 Feb 2025 04:56) (63 - 71)  BP: 133/83 (01 Feb 2025 04:56) (133/83 - 152/85)  BP(mean): 107 (31 Jan 2025 20:12) (107 - 107)  RR: 18 (31 Jan 2025 20:12) (18 - 18)  SpO2: 97% (01 Feb 2025 04:56) (97% - 97%)          General:[x   ] NAD, Resting Comfortable,   [   ] other:                                HEENT: [ x  ] NC/AT, EOMI, PERRL , Normal Conjunctivae,   [   ] other:  Cardio: [  x ] RRR, no murmur   [   ] other:                              Pulm: [  x ] No Respiratory Distress,  Lungs CTAB,   [   ] other:                       Abdomen: [x   ]ND/NT, Soft,   [   ] other:    : [ x  ] NO LAM CATHETER, [   ] LAM CATHETER- no meatal tear, no discharge, [   ] other:                                            MSK: [x   ] No joint swelling, Full ROM,   [   ] other:                                         Ext: [  x ]No C/C/E, No calf tenderness,   [   ]other:    Skin: [ x  ]intact,   [   ] other:                                                                   Neurological Examination:  Cognitive: [    ] AAO x 3,   [ x   ]  other: AOx2 - not place                                                                     Attention:  [  x  ] intact,   [  ]  other:                         Memory: [    ] intact,    [ x   ]  other:   2/3 delayed recall with verbal cues  Mood/Affect: [  x  ] wnl,    [    ]  other:                                                                             Communication: [    ]Fluent, no dysarthria, following commands:  [   x ] other: mild dysarthria, expressive aphasia, mild anomia, hypophonia   CN II - XII:  [    ] intact,  [ x   ] other: R facial droop                                                                                        Motor:   RIGHT UE: [   ] WNL,  [x   ] other: 4/5  LEFT    UE: [  x ] WNL,  [   ] other:  RIGHT LE: [   ] WNL,  [x   ] other: 4/5  LEFT    LE: [x   ] WNL,  [   ] other:    Tone: [  x ] wnl,   [    ]  other:  DTRs: [   ]symmetric, [ x  ] other: R patellar reflex brisk 3+  Coordination:   [x    ] intact,   [    ] other:                                                                           Sensory: [   x ] Intact to light touch,   [    ] other:    MEDICATIONS  (STANDING):  amLODIPine   Tablet 5 milliGRAM(s) Oral daily  aspirin  chewable 81 milliGRAM(s) Oral daily  atorvastatin 80 milliGRAM(s) Oral at bedtime  carbidopa/levodopa  25/100 2 Tablet(s) Oral three times a day  carbidopa/levodopa CR 50/200 1 Tablet(s) Oral <User Schedule>  carvedilol 3.125 milliGRAM(s) Oral every 12 hours  chlorhexidine 2% Cloths 1 Application(s) Topical <User Schedule>  clopidogrel Tablet 75 milliGRAM(s) Oral daily  dextrose 5%. 1000 milliLiter(s) (100 mL/Hr) IV Continuous <Continuous>  dextrose 5%. 1000 milliLiter(s) (50 mL/Hr) IV Continuous <Continuous>  dextrose 50% Injectable 25 Gram(s) IV Push once  enoxaparin Injectable 40 milliGRAM(s) SubCutaneous every 24 hours  entacapone 200 milliGRAM(s) Oral three times a day  glucagon  Injectable 1 milliGRAM(s) IntraMuscular once  insulin lispro (ADMELOG) corrective regimen sliding scale   SubCutaneous three times a day before meals  levETIRAcetam 500 milliGRAM(s) Oral two times a day  mirtazapine 7.5 milliGRAM(s) Oral daily  polyethylene glycol 3350 17 Gram(s) Oral daily  rivastigmine patch  9.5 mG/24 Hr(s). 1 Patch Transdermal every 24 hours  senna 2 Tablet(s) Oral at bedtime  traZODone 50 milliGRAM(s) Oral at bedtime    MEDICATIONS  (PRN):  acetaminophen     Tablet .. 650 milliGRAM(s) Oral every 6 hours PRN Temp greater or equal to 38C (100.4F), Mild Pain (1 - 3)  aluminum hydroxide/magnesium hydroxide/simethicone Suspension 30 milliLiter(s) Oral every 4 hours PRN Dyspepsia  dextrose Oral Gel 15 Gram(s) Oral once PRN Blood Glucose LESS THAN 70 milliGRAM(s)/deciliter  magnesium hydroxide Suspension 30 milliLiter(s) Oral daily PRN Constipation  melatonin 3 milliGRAM(s) Oral at bedtime PRN Insomnia            RECENT LABS/IMAGING    POCT Blood Glucose.: 198 mg/dL (01-31-25 @ 10:54)  POCT Blood Glucose.: 85 mg/dL (01-31-25 @ 07:39)  POCT Blood Glucose.: 222 mg/dL (01-30-25 @ 20:20)  POCT Blood Glucose.: 75 mg/dL (01-30-25 @ 16:47)  POCT Blood Glucose.: 65 mg/dL (01-30-25 @ 16:27)  POCT Blood Glucose.: 165 mg/dL (01-30-25 @ 11:28)  POCT Blood Glucose.: 88 mg/dL (01-30-25 @ 07:33)  POCT Blood Glucose.: 119 mg/dL (01-29-25 @ 21:01)  POCT Blood Glucose.: 93 mg/dL (01-29-25 @ 16:25)  POCT Blood Glucose.: 172 mg/dL (01-29-25 @ 11:02)  POCT Blood Glucose.: 89 mg/dL (01-29-25 @ 07:26)  POCT Blood Glucose.: 172 mg/dL (01-28-25 @ 21:34)                            15.9   8.97  )-----------( 151      ( 27 Jan 2025 05:49 )             46.3     01-27    139  |  104  |  19  ----------------------------<  70  4.2   |  25  |  1.0    Ca    8.6      27 Jan 2025 05:49  Mg     2.0     01-27    TPro  5.8[L]  /  Alb  3.6  /  TBili  0.9  /  DBili  x   /  AST  15  /  ALT  <5  /  AlkPhos  87  01-27     Patient is a 74 y old male who presents with a chief complaint of acute stroke and Parkinson's Syndrome with decline in function (24 Jan 2025 13:28)      HPI:    74 M PMH of Parkinson's, HTN, hx ischemic CVA x2, type 2 DM, dementia and chronic back pain, Hx of seizures on keppra BIBA from [facility] facility  or generalized weakness, confusion, decreased PO intake and multiple witnessed falls over the past few days. He had two witnessed falls the day prior to admission where he was weak and slumped to the ground. No LOC, not on AC. Per staff, he is normally alert and oriented x 3 and able to perform ADLs, but for the past few days has been increasingly confused. He was evaluated in the ED the day prior for similar complaints, had a negative work up and was discharged. Infectious and trauma workup negative. On 1/21 patient was notes to have expressive aphasia and R facial droop. Evaluated by neurology, MRI ordered and showed acute stroke in the L corona radiata. CTA Head/Neck showed severe stenosis left MCA proximal M2. Moderate narrowing right proximal vertebral artery On 1/22 new onset L tongue deviation and mild dysarthria - repeat MRI brain with no new acute findings. ILR placed 1/24.    Prior to admission, the patient was independent in ADLs and ambulation without an assistive device Patient now requires Reza for bed mobility, modA for transfers, ambulating 40’x2 with RW and modA,  modA for LBD. Therefore, there is a significant functional decline from baseline. Patients also with medical comorbidities of DM, HTN, requiring medication management and monitoring of vitals by Physiatry team and nursing. To return home it is in the patient’s best interest to have acute inpatient rehabilitative services to undergo intensive interdisciplinary therapy. Furthermore, the patient is motivated and is able to tolerate up to 3 hours of daily therapy for 5-6 days a week for a total of 15 hours a week. Evaluated by Physiatry and deemed to be an excellent candidate for admission to  for acute inpatient rehab. Admitted to Acute Rehab on 1/24/25    LS: Lives alone in a private home, 6STE  PLOF: independent in ADLs and ambulation with no AS     (24 Jan 2025 13:28)    TODAY'S SUBJECTIVE & REVIEW OF SYMPTOMS:  Patient seen at bedside. No acute events overnight. Doing well. Offers no complaints. Tolerating therapies. Vitals reviewed.    CLOF: Reza for bed mobility, min A for transfers, ambulating 75 ft. with RW and min A, dressing with min assist       Constitutional:    [   ] WNL           [   ] poor appetite   [   ] insomnia   [   ] tired   [ x] weak   Cardio:                [ x  ] WNL           [   ] CP   [   ] SY   [   ] palpitations               Resp:                   [ x  ] WNL           [   ] SOB   [   ] cough   [   ] wheezing   GI:                        [  x ] WNL           [   ] constipation   [   ] diarrhea   [   ] abdominal pain   [   ] nausea   [   ] emesis                                :                      [   x] WNL           [   ] LAM  [   ] dysuria   [   ] difficulty voiding             Endo:                   [  x ] WNL          [   ] polyuria   [   ] temperature intolerance                 Skin:                     [  x ] WNL          [   ] pain   [   ] wound   [   ] rash   MSK:                    [  x ] WNL          [   ] muscle pain   [   ] joint pain/ stiffness   [   ] muscle tenderness   [   ] swelling   Neuro:                 [ x  ] WNL          [   ] HA   [   ] change in vision   [   ] tremor   [   ] weakness   [   ]dysphagia              Cognitive:           [   ] WNL           [ x  ]confusion at times - feels 'off'   Psych:                  [   ] WNL           [   ] hallucinations   [   ]agitation   [   ] delusion   [ x  ]depression/ anxiety    PHYSICAL EXAM    Vital Signs Last 24 Hrs  T(C): 36.6 (01 Feb 2025 04:56), Max: 36.7 (31 Jan 2025 20:12)  T(F): 97.8 (01 Feb 2025 04:56), Max: 98 (31 Jan 2025 20:12)  HR: 63 (01 Feb 2025 04:56) (63 - 71)  BP: 133/83 (01 Feb 2025 04:56) (133/83 - 152/85)  BP(mean): 107 (31 Jan 2025 20:12) (107 - 107)  RR: 18 (31 Jan 2025 20:12) (18 - 18)  SpO2: 97% (01 Feb 2025 04:56) (97% - 97%)          General:[x   ] NAD, Resting Comfortable,   [   ] other:                                HEENT: [ x  ] NC/AT, EOMI, PERRL , Normal Conjunctivae,   [   ] other:  Cardio: [  x ] RRR, no murmur   [   ] other:                              Pulm: [  x ] No Respiratory Distress,  Lungs CTAB,   [   ] other:                       Abdomen: [x   ]ND/NT, Soft,   [   ] other:    : [ x  ] NO LAM CATHETER, [   ] LAM CATHETER- no meatal tear, no discharge, [   ] other:                                            MSK: [x   ] No joint swelling, Full ROM,   [   ] other:                                         Ext: [  x ]No C/C/E, No calf tenderness,   [   ]other:    Skin: [ x  ]intact,   [   ] other:                                                                   Neurological Examination:  Cognitive: [    ] AAO x 3,   [ x   ]  other: AOx2 - not place                                                                     Attention:  [  x  ] intact,   [  ]  other:                         Memory: [    ] intact,    [ x   ]  other:   2/3 delayed recall with verbal cues  Mood/Affect: [  x  ] wnl,    [    ]  other:                                                                             Communication: [    ]Fluent, no dysarthria, following commands:  [   x ] other: mild dysarthria, expressive aphasia, mild anomia, hypophonia   CN II - XII:  [    ] intact,  [ x   ] other: R facial droop                                                                                        Motor:   RIGHT UE: [   ] WNL,  [x   ] other: 4/5  LEFT    UE: [  x ] WNL,  [   ] other:  RIGHT LE: [   ] WNL,  [x   ] other: 4/5  LEFT    LE: [x   ] WNL,  [   ] other:    Tone: [  x ] wnl,   [    ]  other:  DTRs: [   ]symmetric, [ x  ] other: R patellar reflex brisk 3+  Coordination:   [x    ] intact,   [    ] other:                                                                           Sensory: [   x ] Intact to light touch,   [    ] other:    MEDICATIONS  (STANDING):  amLODIPine   Tablet 5 milliGRAM(s) Oral daily  aspirin  chewable 81 milliGRAM(s) Oral daily  atorvastatin 80 milliGRAM(s) Oral at bedtime  carbidopa/levodopa  25/100 2 Tablet(s) Oral three times a day  carbidopa/levodopa CR 50/200 1 Tablet(s) Oral <User Schedule>  carvedilol 3.125 milliGRAM(s) Oral every 12 hours  chlorhexidine 2% Cloths 1 Application(s) Topical <User Schedule>  clopidogrel Tablet 75 milliGRAM(s) Oral daily  dextrose 5%. 1000 milliLiter(s) (100 mL/Hr) IV Continuous <Continuous>  dextrose 5%. 1000 milliLiter(s) (50 mL/Hr) IV Continuous <Continuous>  dextrose 50% Injectable 25 Gram(s) IV Push once  enoxaparin Injectable 40 milliGRAM(s) SubCutaneous every 24 hours  entacapone 200 milliGRAM(s) Oral three times a day  glucagon  Injectable 1 milliGRAM(s) IntraMuscular once  insulin lispro (ADMELOG) corrective regimen sliding scale   SubCutaneous three times a day before meals  levETIRAcetam 500 milliGRAM(s) Oral two times a day  mirtazapine 7.5 milliGRAM(s) Oral daily  polyethylene glycol 3350 17 Gram(s) Oral daily  rivastigmine patch  9.5 mG/24 Hr(s). 1 Patch Transdermal every 24 hours  senna 2 Tablet(s) Oral at bedtime  traZODone 50 milliGRAM(s) Oral at bedtime    MEDICATIONS  (PRN):  acetaminophen     Tablet .. 650 milliGRAM(s) Oral every 6 hours PRN Temp greater or equal to 38C (100.4F), Mild Pain (1 - 3)  aluminum hydroxide/magnesium hydroxide/simethicone Suspension 30 milliLiter(s) Oral every 4 hours PRN Dyspepsia  dextrose Oral Gel 15 Gram(s) Oral once PRN Blood Glucose LESS THAN 70 milliGRAM(s)/deciliter  magnesium hydroxide Suspension 30 milliLiter(s) Oral daily PRN Constipation  melatonin 3 milliGRAM(s) Oral at bedtime PRN Insomnia            RECENT LABS/IMAGING    POCT Blood Glucose.: 198 mg/dL (01-31-25 @ 10:54)  POCT Blood Glucose.: 85 mg/dL (01-31-25 @ 07:39)  POCT Blood Glucose.: 222 mg/dL (01-30-25 @ 20:20)  POCT Blood Glucose.: 75 mg/dL (01-30-25 @ 16:47)  POCT Blood Glucose.: 65 mg/dL (01-30-25 @ 16:27)  POCT Blood Glucose.: 165 mg/dL (01-30-25 @ 11:28)  POCT Blood Glucose.: 88 mg/dL (01-30-25 @ 07:33)  POCT Blood Glucose.: 119 mg/dL (01-29-25 @ 21:01)  POCT Blood Glucose.: 93 mg/dL (01-29-25 @ 16:25)  POCT Blood Glucose.: 172 mg/dL (01-29-25 @ 11:02)  POCT Blood Glucose.: 89 mg/dL (01-29-25 @ 07:26)  POCT Blood Glucose.: 172 mg/dL (01-28-25 @ 21:34)                            15.9   8.97  )-----------( 151      ( 27 Jan 2025 05:49 )             46.3     01-27    139  |  104  |  19  ----------------------------<  70  4.2   |  25  |  1.0    Ca    8.6      27 Jan 2025 05:49  Mg     2.0     01-27    TPro  5.8[L]  /  Alb  3.6  /  TBili  0.9  /  DBili  x   /  AST  15  /  ALT  <5  /  AlkPhos  87  01-27

## 2025-02-02 LAB
GLUCOSE BLDC GLUCOMTR-MCNC: 128 MG/DL — HIGH (ref 70–99)
GLUCOSE BLDC GLUCOMTR-MCNC: 139 MG/DL — HIGH (ref 70–99)
GLUCOSE BLDC GLUCOMTR-MCNC: 156 MG/DL — HIGH (ref 70–99)
GLUCOSE BLDC GLUCOMTR-MCNC: 169 MG/DL — HIGH (ref 70–99)

## 2025-02-02 RX ADMIN — Medication 2 TABLET(S): at 21:14

## 2025-02-02 RX ADMIN — Medication 2 TABLET(S): at 15:55

## 2025-02-02 RX ADMIN — Medication 1 TABLET(S): at 20:00

## 2025-02-02 RX ADMIN — ENTACAPONE 200 MILLIGRAM(S): 200 TABLET, FILM COATED ORAL at 05:58

## 2025-02-02 RX ADMIN — ENTACAPONE 200 MILLIGRAM(S): 200 TABLET, FILM COATED ORAL at 15:55

## 2025-02-02 RX ADMIN — MIRTAZAPINE 7.5 MILLIGRAM(S): 30 TABLET, FILM COATED ORAL at 12:31

## 2025-02-02 RX ADMIN — Medication 2 TABLET(S): at 21:15

## 2025-02-02 RX ADMIN — Medication 5 MILLIGRAM(S): at 05:58

## 2025-02-02 RX ADMIN — Medication 2 TABLET(S): at 05:58

## 2025-02-02 RX ADMIN — LEVETIRACETAM 500 MILLIGRAM(S): 750 TABLET, FILM COATED ORAL at 18:01

## 2025-02-02 RX ADMIN — Medication 3.12 MILLIGRAM(S): at 18:02

## 2025-02-02 RX ADMIN — ENOXAPARIN SODIUM 40 MILLIGRAM(S): 100 INJECTION SUBCUTANEOUS at 05:58

## 2025-02-02 RX ADMIN — Medication 75 MILLIGRAM(S): at 12:31

## 2025-02-02 RX ADMIN — ASPIRIN 81 MILLIGRAM(S): 81 TABLET, COATED ORAL at 12:31

## 2025-02-02 RX ADMIN — ATORVASTATIN CALCIUM 80 MILLIGRAM(S): 80 TABLET, FILM COATED ORAL at 21:15

## 2025-02-02 RX ADMIN — Medication 3.12 MILLIGRAM(S): at 05:58

## 2025-02-02 RX ADMIN — ANTISEPTIC SURGICAL SCRUB 1 APPLICATION(S): 0.04 SOLUTION TOPICAL at 05:57

## 2025-02-02 RX ADMIN — RIVASTIGMINE 1 PATCH: 4.6 PATCH, EXTENDED RELEASE TRANSDERMAL at 04:34

## 2025-02-02 RX ADMIN — POLYETHYLENE GLYCOL 3350 17 GRAM(S): 17 POWDER, FOR SOLUTION ORAL at 12:31

## 2025-02-02 RX ADMIN — ENTACAPONE 200 MILLIGRAM(S): 200 TABLET, FILM COATED ORAL at 21:14

## 2025-02-02 RX ADMIN — LEVETIRACETAM 500 MILLIGRAM(S): 750 TABLET, FILM COATED ORAL at 05:58

## 2025-02-02 RX ADMIN — Medication 50 MILLIGRAM(S): at 21:15

## 2025-02-02 RX ADMIN — RIVASTIGMINE 1 PATCH: 4.6 PATCH, EXTENDED RELEASE TRANSDERMAL at 05:56

## 2025-02-02 NOTE — PROGRESS NOTE ADULT - SUBJECTIVE AND OBJECTIVE BOX
Patient is a 74 y old male who presents with a chief complaint of acute stroke and Parkinson's Syndrome with decline in function (24 Jan 2025 13:28)      HPI:    74 M PMH of Parkinson's, HTN, hx ischemic CVA x2, type 2 DM, dementia and chronic back pain, Hx of seizures on keppra BIBA from [facility] facility  or generalized weakness, confusion, decreased PO intake and multiple witnessed falls over the past few days. He had two witnessed falls the day prior to admission where he was weak and slumped to the ground. No LOC, not on AC. Per staff, he is normally alert and oriented x 3 and able to perform ADLs, but for the past few days has been increasingly confused. He was evaluated in the ED the day prior for similar complaints, had a negative work up and was discharged. Infectious and trauma workup negative. On 1/21 patient was notes to have expressive aphasia and R facial droop. Evaluated by neurology, MRI ordered and showed acute stroke in the L corona radiata. CTA Head/Neck showed severe stenosis left MCA proximal M2. Moderate narrowing right proximal vertebral artery On 1/22 new onset L tongue deviation and mild dysarthria - repeat MRI brain with no new acute findings. ILR placed 1/24.    Prior to admission, the patient was independent in ADLs and ambulation without an assistive device Patient now requires Reza for bed mobility, modA for transfers, ambulating 40’x2 with RW and modA,  modA for LBD. Therefore, there is a significant functional decline from baseline. Patients also with medical comorbidities of DM, HTN, requiring medication management and monitoring of vitals by Physiatry team and nursing. To return home it is in the patient’s best interest to have acute inpatient rehabilitative services to undergo intensive interdisciplinary therapy. Furthermore, the patient is motivated and is able to tolerate up to 3 hours of daily therapy for 5-6 days a week for a total of 15 hours a week. Evaluated by Physiatry and deemed to be an excellent candidate for admission to  for acute inpatient rehab. Admitted to Acute Rehab on 1/24/25    LS: Lives alone in a private home, 6STE  PLOF: independent in ADLs and ambulation with no AS     (24 Jan 2025 13:28)    TODAY'S SUBJECTIVE & REVIEW OF SYMPTOMS:  Patient seen at bedside. No acute events overnight. Doing well. Offers no complaints. Tolerating therapies. Vitals reviewed. 1:1 in place.    CLOF: Reza for bed mobility, min A for transfers, ambulating 75 ft. with RW and min A, dressing with min assist       Constitutional:    [   ] WNL           [   ] poor appetite   [   ] insomnia   [   ] tired   [ x] weak   Cardio:                [ x  ] WNL           [   ] CP   [   ] SY   [   ] palpitations               Resp:                   [ x  ] WNL           [   ] SOB   [   ] cough   [   ] wheezing   GI:                        [  x ] WNL           [   ] constipation   [   ] diarrhea   [   ] abdominal pain   [   ] nausea   [   ] emesis                                :                      [   x] WNL           [   ] LAM  [   ] dysuria   [   ] difficulty voiding             Endo:                   [  x ] WNL          [   ] polyuria   [   ] temperature intolerance                 Skin:                     [  x ] WNL          [   ] pain   [   ] wound   [   ] rash   MSK:                    [  x ] WNL          [   ] muscle pain   [   ] joint pain/ stiffness   [   ] muscle tenderness   [   ] swelling   Neuro:                 [ x  ] WNL          [   ] HA   [   ] change in vision   [   ] tremor   [   ] weakness   [   ]dysphagia              Cognitive:           [   ] WNL           [ x  ]confusion at times - feels 'off'   Psych:                  [   ] WNL           [   ] hallucinations   [   ]agitation   [   ] delusion   [ x  ]depression/ anxiety    PHYSICAL EXAM    Vital Signs Last 24 Hrs  T(C): 36.4 (02 Feb 2025 05:00), Max: 36.4 (02 Feb 2025 05:00)  T(F): 97.6 (02 Feb 2025 05:00), Max: 97.6 (02 Feb 2025 05:00)  HR: 74 (02 Feb 2025 05:00) (68 - 74)  BP: 122/76 (02 Feb 2025 05:00) (122/64 - 132/65)  BP(mean): --  RR: 18 (02 Feb 2025 05:00) (18 - 18)  SpO2: 98% (01 Feb 2025 11:27) (98% - 98%)          General:[x   ] NAD, Resting Comfortable,   [   ] other:                                HEENT: [ x  ] NC/AT, EOMI, PERRL , Normal Conjunctivae,   [   ] other:  Cardio: [  x ] RRR, no murmur   [   ] other:                              Pulm: [  x ] No Respiratory Distress,  Lungs CTAB,   [   ] other:                       Abdomen: [x   ]ND/NT, Soft,   [   ] other:    : [ x  ] NO LAM CATHETER, [   ] LAM CATHETER- no meatal tear, no discharge, [   ] other:                                            MSK: [x   ] No joint swelling, Full ROM,   [   ] other:                                         Ext: [  x ]No C/C/E, No calf tenderness,   [   ]other:    Skin: [ x  ]intact,   [   ] other:                                                                   Neurological Examination:  Cognitive: [    ] AAO x 3,   [ x   ]  other: AOx2 - not place                                                                     Attention:  [  x  ] intact,   [  ]  other:                         Memory: [    ] intact,    [ x   ]  other:   2/3 delayed recall with verbal cues  Mood/Affect: [  x  ] wnl,    [    ]  other:                                                                             Communication: [    ]Fluent, no dysarthria, following commands:  [   x ] other: mild dysarthria, expressive aphasia, mild anomia, hypophonia   CN II - XII:  [    ] intact,  [ x   ] other: R facial droop                                                                                        Motor:   RIGHT UE: [   ] WNL,  [x   ] other: 4/5  LEFT    UE: [  x ] WNL,  [   ] other:  RIGHT LE: [   ] WNL,  [x   ] other: 4/5  LEFT    LE: [x   ] WNL,  [   ] other:    Tone: [  x ] wnl,   [    ]  other:  DTRs: [   ]symmetric, [ x  ] other: R patellar reflex brisk 3+  Coordination:   [x    ] intact,   [    ] other:                                                                           Sensory: [   x ] Intact to light touch,   [    ] other:    MEDICATIONS  (STANDING):  amLODIPine   Tablet 5 milliGRAM(s) Oral daily  aspirin  chewable 81 milliGRAM(s) Oral daily  atorvastatin 80 milliGRAM(s) Oral at bedtime  carbidopa/levodopa  25/100 2 Tablet(s) Oral three times a day  carbidopa/levodopa CR 50/200 1 Tablet(s) Oral <User Schedule>  carvedilol 3.125 milliGRAM(s) Oral every 12 hours  chlorhexidine 2% Cloths 1 Application(s) Topical <User Schedule>  clopidogrel Tablet 75 milliGRAM(s) Oral daily  dextrose 5%. 1000 milliLiter(s) (100 mL/Hr) IV Continuous <Continuous>  dextrose 5%. 1000 milliLiter(s) (50 mL/Hr) IV Continuous <Continuous>  dextrose 50% Injectable 25 Gram(s) IV Push once  enoxaparin Injectable 40 milliGRAM(s) SubCutaneous every 24 hours  entacapone 200 milliGRAM(s) Oral three times a day  glucagon  Injectable 1 milliGRAM(s) IntraMuscular once  insulin lispro (ADMELOG) corrective regimen sliding scale   SubCutaneous three times a day before meals  levETIRAcetam 500 milliGRAM(s) Oral two times a day  mirtazapine 7.5 milliGRAM(s) Oral daily  polyethylene glycol 3350 17 Gram(s) Oral daily  rivastigmine patch  9.5 mG/24 Hr(s). 1 Patch Transdermal every 24 hours  senna 2 Tablet(s) Oral at bedtime  traZODone 50 milliGRAM(s) Oral at bedtime    MEDICATIONS  (PRN):  acetaminophen     Tablet .. 650 milliGRAM(s) Oral every 6 hours PRN Temp greater or equal to 38C (100.4F), Mild Pain (1 - 3)  aluminum hydroxide/magnesium hydroxide/simethicone Suspension 30 milliLiter(s) Oral every 4 hours PRN Dyspepsia  dextrose Oral Gel 15 Gram(s) Oral once PRN Blood Glucose LESS THAN 70 milliGRAM(s)/deciliter  magnesium hydroxide Suspension 30 milliLiter(s) Oral daily PRN Constipation  melatonin 3 milliGRAM(s) Oral at bedtime PRN Insomnia            RECENT LABS/IMAGING    POCT Blood Glucose.: 198 mg/dL (01-31-25 @ 10:54)  POCT Blood Glucose.: 85 mg/dL (01-31-25 @ 07:39)  POCT Blood Glucose.: 222 mg/dL (01-30-25 @ 20:20)  POCT Blood Glucose.: 75 mg/dL (01-30-25 @ 16:47)  POCT Blood Glucose.: 65 mg/dL (01-30-25 @ 16:27)  POCT Blood Glucose.: 165 mg/dL (01-30-25 @ 11:28)  POCT Blood Glucose.: 88 mg/dL (01-30-25 @ 07:33)  POCT Blood Glucose.: 119 mg/dL (01-29-25 @ 21:01)  POCT Blood Glucose.: 93 mg/dL (01-29-25 @ 16:25)  POCT Blood Glucose.: 172 mg/dL (01-29-25 @ 11:02)  POCT Blood Glucose.: 89 mg/dL (01-29-25 @ 07:26)  POCT Blood Glucose.: 172 mg/dL (01-28-25 @ 21:34)                            15.9   8.97  )-----------( 151      ( 27 Jan 2025 05:49 )             46.3     01-27    139  |  104  |  19  ----------------------------<  70  4.2   |  25  |  1.0    Ca    8.6      27 Jan 2025 05:49  Mg     2.0     01-27    TPro  5.8[L]  /  Alb  3.6  /  TBili  0.9  /  DBili  x   /  AST  15  /  ALT  <5  /  AlkPhos  87  01-27     Patient is a 74 y old male who presents with a chief complaint of acute stroke and Parkinson's Syndrome with decline in function (24 Jan 2025 13:28)      HPI:    74 M PMH of Parkinson's, HTN, hx ischemic CVA x2, type 2 DM, dementia and chronic back pain, Hx of seizures on keppra BIBA from [facility] facility  or generalized weakness, confusion, decreased PO intake and multiple witnessed falls over the past few days. He had two witnessed falls the day prior to admission where he was weak and slumped to the ground. No LOC, not on AC. Per staff, he is normally alert and oriented x 3 and able to perform ADLs, but for the past few days has been increasingly confused. He was evaluated in the ED the day prior for similar complaints, had a negative work up and was discharged. Infectious and trauma workup negative. On 1/21 patient was notes to have expressive aphasia and R facial droop. Evaluated by neurology, MRI ordered and showed acute stroke in the L corona radiata. CTA Head/Neck showed severe stenosis left MCA proximal M2. Moderate narrowing right proximal vertebral artery On 1/22 new onset L tongue deviation and mild dysarthria - repeat MRI brain with no new acute findings. ILR placed 1/24.    Prior to admission, the patient was independent in ADLs and ambulation without an assistive device Patient now requires Reza for bed mobility, modA for transfers, ambulating 40’x2 with RW and modA,  modA for LBD. Therefore, there is a significant functional decline from baseline. Patients also with medical comorbidities of DM, HTN, requiring medication management and monitoring of vitals by Physiatry team and nursing. To return home it is in the patient’s best interest to have acute inpatient rehabilitative services to undergo intensive interdisciplinary therapy. Furthermore, the patient is motivated and is able to tolerate up to 3 hours of daily therapy for 5-6 days a week for a total of 15 hours a week. Evaluated by Physiatry and deemed to be an excellent candidate for admission to  for acute inpatient rehab. Admitted to Acute Rehab on 1/24/25    LS: Lives alone in a private home, 6STE  PLOF: independent in ADLs and ambulation with no AS     (24 Jan 2025 13:28)    TODAY'S SUBJECTIVE & REVIEW OF SYMPTOMS:  Patient seen at bedside. No acute events overnight. Doing well. Offers no complaints. Tolerating therapies. Vitals reviewed. 1:1 in place.    CLOF: Reza for bed mobility, min A for transfers, ambulating 75 ft. with RW and min A, dressing with min assist       Constitutional:    [   ] WNL           [   ] poor appetite   [   ] insomnia   [   ] tired   [ x] weak   Cardio:                [ x  ] WNL           [   ] CP   [   ] SY   [   ] palpitations               Resp:                   [ x  ] WNL           [   ] SOB   [   ] cough   [   ] wheezing   GI:                        [  x ] WNL           [   ] constipation   [   ] diarrhea   [   ] abdominal pain   [   ] nausea   [   ] emesis                                :                      [   x] WNL           [   ] LAM  [   ] dysuria   [   ] difficulty voiding             Endo:                   [  x ] WNL          [   ] polyuria   [   ] temperature intolerance                 Skin:                     [  x ] WNL          [   ] pain   [   ] wound   [   ] rash   MSK:                    [  x ] WNL          [   ] muscle pain   [   ] joint pain/ stiffness   [   ] muscle tenderness   [   ] swelling   Neuro:                 [ x  ] WNL          [   ] HA   [   ] change in vision   [   ] tremor   [   ] weakness   [   ]dysphagia              Cognitive:           [   ] WNL           [ x  ]confusion at times - feels 'off'   Psych:                  [   ] WNL           [   ] hallucinations   [   ]agitation   [   ] delusion   [ x  ]depression/ anxiety    PHYSICAL EXAM    Vital Signs Last 24 Hrs  T(C): 36.4 (02 Feb 2025 05:00), Max: 36.4 (02 Feb 2025 05:00)  T(F): 97.6 (02 Feb 2025 05:00), Max: 97.6 (02 Feb 2025 05:00)  HR: 74 (02 Feb 2025 05:00) (68 - 74)  BP: 122/76 (02 Feb 2025 05:00) (122/64 - 132/65)  BP(mean): --  RR: 18 (02 Feb 2025 05:00) (18 - 18)  SpO2: 98% (01 Feb 2025 11:27) (98% - 98%)          General:[x   ] NAD, Resting Comfortable,   [   ] other:                                HEENT: [ x  ] NC/AT, EOMI, PERRL , Normal Conjunctivae,   [   ] other:  Cardio: [  x ] RRR, no murmur   [   ] other:                              Pulm: [  x ] No Respiratory Distress,  Lungs CTAB,   [   ] other:                       Abdomen: [x   ]ND/NT, Soft,   [   ] other:    : [ x  ] NO LAM CATHETER, [   ] LAM CATHETER- no meatal tear, no discharge, [   ] other:                                            MSK: [x   ] No joint swelling, Full ROM,   [   ] other:                                         Ext: [  x ]No C/C/E, No calf tenderness,   [   ]other:    Skin: [ x  ]intact,   [   ] other:                                                                   Neurological Examination:  Cognitive: [    ] AAO x 3,   [ x   ]  other: AOx2 - not place                                                                     Attention:  [  x  ] intact,   [  ]  other:                         Memory: [    ] intact,    [ x   ]  other:   2/3 delayed recall with verbal cues  Mood/Affect: [  x  ] wnl,    [    ]  other:                                                                             Communication: [    ]Fluent, no dysarthria, following commands:  [   x ] other: mild dysarthria, expressive aphasia, mild anomia, hypophonia   CN II - XII:  [    ] intact,  [ x   ] other: R facial droop                                                                                        Motor:   RIGHT UE: [   ] WNL,  [x   ] other: 4/5  LEFT    UE: [  x ] WNL,  [   ] other:  RIGHT LE: [   ] WNL,  [x   ] other: 4/5  LEFT    LE: [x   ] WNL,  [   ] other:    Tone: [  x ] wnl,   [    ]  other:  DTRs: [   ]symmetric, [ x  ] other: R patellar reflex brisk 3+  Coordination:   [x    ] intact,   [    ] other:                                                                           Sensory: [   x ] Intact to light touch,   [    ] other:    MEDICATIONS  (STANDING):  amLODIPine   Tablet 5 milliGRAM(s) Oral daily  aspirin  chewable 81 milliGRAM(s) Oral daily  atorvastatin 80 milliGRAM(s) Oral at bedtime  carbidopa/levodopa  25/100 2 Tablet(s) Oral three times a day  carbidopa/levodopa CR 50/200 1 Tablet(s) Oral <User Schedule>  carvedilol 3.125 milliGRAM(s) Oral every 12 hours  chlorhexidine 2% Cloths 1 Application(s) Topical <User Schedule>  clopidogrel Tablet 75 milliGRAM(s) Oral daily  dextrose 5%. 1000 milliLiter(s) (100 mL/Hr) IV Continuous <Continuous>  dextrose 5%. 1000 milliLiter(s) (50 mL/Hr) IV Continuous <Continuous>  dextrose 50% Injectable 25 Gram(s) IV Push once  enoxaparin Injectable 40 milliGRAM(s) SubCutaneous every 24 hours  entacapone 200 milliGRAM(s) Oral three times a day  glucagon  Injectable 1 milliGRAM(s) IntraMuscular once  insulin lispro (ADMELOG) corrective regimen sliding scale   SubCutaneous three times a day before meals  levETIRAcetam 500 milliGRAM(s) Oral two times a day  mirtazapine 7.5 milliGRAM(s) Oral daily  polyethylene glycol 3350 17 Gram(s) Oral daily  rivastigmine patch  9.5 mG/24 Hr(s). 1 Patch Transdermal every 24 hours  senna 2 Tablet(s) Oral at bedtime  traZODone 50 milliGRAM(s) Oral at bedtime    MEDICATIONS  (PRN):  acetaminophen     Tablet .. 650 milliGRAM(s) Oral every 6 hours PRN Temp greater or equal to 38C (100.4F), Mild Pain (1 - 3)  aluminum hydroxide/magnesium hydroxide/simethicone Suspension 30 milliLiter(s) Oral every 4 hours PRN Dyspepsia  dextrose Oral Gel 15 Gram(s) Oral once PRN Blood Glucose LESS THAN 70 milliGRAM(s)/deciliter  magnesium hydroxide Suspension 30 milliLiter(s) Oral daily PRN Constipation  melatonin 3 milliGRAM(s) Oral at bedtime PRN Insomnia            RECENT LABS/IMAGING                POCT Blood Glucose.: 169 mg/dL (02-02-25 @ 11:19)  POCT Blood Glucose.: 139 mg/dL (02-02-25 @ 07:29)  POCT Blood Glucose.: 155 mg/dL (02-01-25 @ 21:01)  POCT Blood Glucose.: 95 mg/dL (02-01-25 @ 16:21)  POCT Blood Glucose.: 285 mg/dL (02-01-25 @ 11:27)  POCT Blood Glucose.: 99 mg/dL (02-01-25 @ 07:33)  POCT Blood Glucose.: 123 mg/dL (01-31-25 @ 20:46)  POCT Blood Glucose.: 102 mg/dL (01-31-25 @ 16:23)  POCT Blood Glucose.: 198 mg/dL (01-31-25 @ 10:54)  POCT Blood Glucose.: 85 mg/dL (01-31-25 @ 07:39)  POCT Blood Glucose.: 222 mg/dL (01-30-25 @ 20:20)  POCT Blood Glucose.: 75 mg/dL (01-30-25 @ 16:47)                          15.9   8.97  )-----------( 151      ( 27 Jan 2025 05:49 )             46.3     01-27    139  |  104  |  19  ----------------------------<  70  4.2   |  25  |  1.0    Ca    8.6      27 Jan 2025 05:49  Mg     2.0     01-27    TPro  5.8[L]  /  Alb  3.6  /  TBili  0.9  /  DBili  x   /  AST  15  /  ALT  <5  /  AlkPhos  87  01-27

## 2025-02-02 NOTE — PROGRESS NOTE ADULT - ATTENDING COMMENTS
I reviewed the chart and examined the patient with the resident and we discussed the findings and treatment plan.  The patient is tolerating the rehab program well. I agree with the findings and treatment plan above, which I modified as indicated. The patient requires 3 hrs a day of acute inpatient rehab. No new complaints. Alert. VSS. Neuro stable. Feeling stronger over time and less confused. Continue rehab program.    I read, edited and agree with the physical exam above and assessment:  #Rehab for gait abnormality, dysarthria and expressive aphasia iso acute L corona radiata stroke  -Imaging as above  -s/p ILR 1/24  -s/p  EEG 1/22: generalized slowing, no seizures  - A1C 4.9, , TSH 2.81  -c/w ASA and Plavix for 90 days  -c/w Lipitor 80 mg qhs  -PT/OT/SLP/Neuropsych eval and treat   - mobility and ADLs and cognition improving    #HTN  -c/w amlodipine 5 mg daily  -c/w Coreg 3.125 mg q12h  -Monitor BP     #HLD  -c/w Lipitor 80 mg qhs    #DM2  - A1C with Estimated Average Glucose Result: 4.9: Method: Immunoassay - well controlled  - on low dose gabapentin at night. No c/o of pain or dysesthesias. d/c and monitor.    #Hx of seizures  -c/w Keppra 500 bid    #Hx of Parkinson’s  #Hx of dementia. Mild and does not interfere with patient's ability to participate in and benefit from acute rehab.  -c/w home meds sinemet, entacapone, and rivastigmine     #Anxiety/depression  #Insomnia  -c/w mirtazapine 7.5 daily  -c/w trazadone 100mg qhs - given increased confusion, decreased to 50 mg hs   -c/w melatonin 3 mg qhs prn  - sleeping well and more alert during the day     -Pain control: tylenol prn    -GI/Bowel Mgmt: senna/Miralax    - Diet: Easy to chew with thins DASH/carb consistent

## 2025-02-02 NOTE — PROGRESS NOTE ADULT - ASSESSMENT
74 M PMH of Parkinson's, HTN, hx ischemic CVA x2, type 2 DM, dementia and chronic back pain, Hx of seizures on keppra BIBA from [place] facility  or generalized weakness, confusion, decreased PO intake and multiple witnessed falls over the past few days. He had two witnessed falls the day prior to admission where he was weak and slumped to the ground. No LOC, not on AC. Per staff, he is normally alert and oriented x 3 and able to perform ADLs, but for the past few days has been increasingly confused. He was evaluated in the ED the day prior for similar complaints, had a negative work up and was discharged. Infectious and trauma workup negative. On 1/21 patient was notes to have expressive aphasia and R facial droop. Evaluated by neurology, MRI ordered and showed acute stroke in the L corona radiata. CTA Head/Neck showed severe stenosis left MCA proximal M2. Moderate narrowing right proximal vertebral artery On 1/22 new onset L tongue deviation and mild dysarthria - repeat MRI brain with no new acute findings. ILR placed 1/24. NIHSS 4 on admission.    #Rehab for gait abnormality, dysarthria and expressive aphasia iso acute L corona radiata stroke  -Imaging as above  -s/p ILR 1/24  -s/p  EEG 1/22: generalized slowing, no seizures  - A1C 4.9, , TSH 2.81  -c/w ASA and Plavix for 90 days  -c/w Lipitor 80 mg qhs  -PT/OT/SLP/Neuropsych eval and treat   - mobility and ADLs and cognition improving    #HTN  -c/w amlodipine 5 mg daily  -c/w Coreg 3.125 mg q12h  -Monitor BP     #HLD  -c/w Lipitor 80 mg qhs    #DM2  - A1C with Estimated Average Glucose Result: 4.9: Method: Immunoassay - well controlled  - on low dose gabapentin at night. No c/o of pain or dysesthesias. Will d/c and monitor.    #Hx of seizures  -c/w Keppra 500 bid    #Hx of Parkinson’s  #Hx of dementia. Mild and does not interfere with patient's ability to participate in and benefit from acute rehab.  -c/w home meds sinemet, entacapone, and rivastigmine     #Anxiety/depression  #Insomnia  -c/w mirtazapine 7.5 daily  -c/w trazadone 100mg qhs - given increased confusion, decreased to 50 mg hs and monitor  -c/w melatonin 3 mg qhs prn  - sleeping well and more alert during the day     -Pain control: tylenol prn    -GI/Bowel Mgmt: senna/Miralax    - Diet: Easy to chew with thins DASH/carb consistent           Precautions / PROPHYLAXIS:      - Falls      - DVT prophylaxis: Lovenox     74 M PMH of Parkinson's, HTN, hx ischemic CVA x2, type 2 DM, dementia and chronic back pain, Hx of seizures on keppra BIBA from [place] facility  or generalized weakness, confusion, decreased PO intake and multiple witnessed falls over the past few days. He had two witnessed falls the day prior to admission where he was weak and slumped to the ground. No LOC, not on AC. Per staff, he is normally alert and oriented x 3 and able to perform ADLs, but for the past few days has been increasingly confused. He was evaluated in the ED the day prior for similar complaints, had a negative work up and was discharged. Infectious and trauma workup negative. On 1/21 patient was notes to have expressive aphasia and R facial droop. Evaluated by neurology, MRI ordered and showed acute stroke in the L corona radiata. CTA Head/Neck showed severe stenosis left MCA proximal M2. Moderate narrowing right proximal vertebral artery On 1/22 new onset L tongue deviation and mild dysarthria - repeat MRI brain with no new acute findings. ILR placed 1/24. NIHSS 4 on admission.    #Rehab for gait abnormality, dysarthria and expressive aphasia iso acute L corona radiata stroke  -Imaging as above  -s/p ILR 1/24  -s/p  EEG 1/22: generalized slowing, no seizures  - A1C 4.9, , TSH 2.81  -c/w ASA and Plavix for 90 days  -c/w Lipitor 80 mg qhs  -PT/OT/SLP/Neuropsych eval and treat   - mobility and ADLs and cognition improving    #HTN  -c/w amlodipine 5 mg daily  -c/w Coreg 3.125 mg q12h  -Monitor BP     #HLD  -c/w Lipitor 80 mg qhs    #DM2  - A1C with Estimated Average Glucose Result: 4.9: Method: Immunoassay - well controlled  - on low dose gabapentin at night. No c/o of pain or dysesthesias. d/c and monitor.    #Hx of seizures  -c/w Keppra 500 bid    #Hx of Parkinson’s  #Hx of dementia. Mild and does not interfere with patient's ability to participate in and benefit from acute rehab.  -c/w home meds sinemet, entacapone, and rivastigmine     #Anxiety/depression  #Insomnia  -c/w mirtazapine 7.5 daily  -c/w trazadone 100mg qhs - given increased confusion, decreased to 50 mg hs   -c/w melatonin 3 mg qhs prn  - sleeping well and more alert during the day     -Pain control: tylenol prn    -GI/Bowel Mgmt: senna/Miralax    - Diet: Easy to chew with thins DASH/carb consistent           Precautions / PROPHYLAXIS:      - Falls      - DVT prophylaxis: Lovenox

## 2025-02-03 LAB
ALBUMIN SERPL ELPH-MCNC: 3.7 G/DL — SIGNIFICANT CHANGE UP (ref 3.5–5.2)
ALP SERPL-CCNC: 93 U/L — SIGNIFICANT CHANGE UP (ref 30–115)
ALT FLD-CCNC: 11 U/L — SIGNIFICANT CHANGE UP (ref 0–41)
ANION GAP SERPL CALC-SCNC: 14 MMOL/L — SIGNIFICANT CHANGE UP (ref 7–14)
AST SERPL-CCNC: 23 U/L — SIGNIFICANT CHANGE UP (ref 0–41)
BASOPHILS # BLD AUTO: 0.03 K/UL — SIGNIFICANT CHANGE UP (ref 0–0.2)
BASOPHILS NFR BLD AUTO: 0.3 % — SIGNIFICANT CHANGE UP (ref 0–1)
BILIRUB SERPL-MCNC: 0.7 MG/DL — SIGNIFICANT CHANGE UP (ref 0.2–1.2)
BUN SERPL-MCNC: 17 MG/DL — SIGNIFICANT CHANGE UP (ref 10–20)
CALCIUM SERPL-MCNC: 8.7 MG/DL — SIGNIFICANT CHANGE UP (ref 8.4–10.5)
CHLORIDE SERPL-SCNC: 105 MMOL/L — SIGNIFICANT CHANGE UP (ref 98–110)
CO2 SERPL-SCNC: 23 MMOL/L — SIGNIFICANT CHANGE UP (ref 17–32)
CREAT SERPL-MCNC: 1.1 MG/DL — SIGNIFICANT CHANGE UP (ref 0.7–1.5)
EGFR: 71 ML/MIN/1.73M2 — SIGNIFICANT CHANGE UP
EOSINOPHIL # BLD AUTO: 0.1 K/UL — SIGNIFICANT CHANGE UP (ref 0–0.7)
EOSINOPHIL NFR BLD AUTO: 1.1 % — SIGNIFICANT CHANGE UP (ref 0–8)
GLUCOSE BLDC GLUCOMTR-MCNC: 106 MG/DL — HIGH (ref 70–99)
GLUCOSE BLDC GLUCOMTR-MCNC: 111 MG/DL — HIGH (ref 70–99)
GLUCOSE BLDC GLUCOMTR-MCNC: 145 MG/DL — HIGH (ref 70–99)
GLUCOSE BLDC GLUCOMTR-MCNC: 153 MG/DL — HIGH (ref 70–99)
GLUCOSE SERPL-MCNC: 74 MG/DL — SIGNIFICANT CHANGE UP (ref 70–99)
HCT VFR BLD CALC: 45.3 % — SIGNIFICANT CHANGE UP (ref 42–52)
HGB BLD-MCNC: 15.4 G/DL — SIGNIFICANT CHANGE UP (ref 14–18)
IMM GRANULOCYTES NFR BLD AUTO: 0.3 % — SIGNIFICANT CHANGE UP (ref 0.1–0.3)
LYMPHOCYTES # BLD AUTO: 2.33 K/UL — SIGNIFICANT CHANGE UP (ref 1.2–3.4)
LYMPHOCYTES # BLD AUTO: 24.9 % — SIGNIFICANT CHANGE UP (ref 20.5–51.1)
MAGNESIUM SERPL-MCNC: 2.1 MG/DL — SIGNIFICANT CHANGE UP (ref 1.8–2.4)
MCHC RBC-ENTMCNC: 33.7 PG — HIGH (ref 27–31)
MCHC RBC-ENTMCNC: 34 G/DL — SIGNIFICANT CHANGE UP (ref 32–37)
MCV RBC AUTO: 99.1 FL — HIGH (ref 80–94)
MONOCYTES # BLD AUTO: 0.76 K/UL — HIGH (ref 0.1–0.6)
MONOCYTES NFR BLD AUTO: 8.1 % — SIGNIFICANT CHANGE UP (ref 1.7–9.3)
NEUTROPHILS # BLD AUTO: 6.09 K/UL — SIGNIFICANT CHANGE UP (ref 1.4–6.5)
NEUTROPHILS NFR BLD AUTO: 65.3 % — SIGNIFICANT CHANGE UP (ref 42.2–75.2)
NRBC # BLD: 0 /100 WBCS — SIGNIFICANT CHANGE UP (ref 0–0)
NRBC BLD-RTO: 0 /100 WBCS — SIGNIFICANT CHANGE UP (ref 0–0)
PLATELET # BLD AUTO: 173 K/UL — SIGNIFICANT CHANGE UP (ref 130–400)
PMV BLD: 10.1 FL — SIGNIFICANT CHANGE UP (ref 7.4–10.4)
POTASSIUM SERPL-MCNC: 4.7 MMOL/L — SIGNIFICANT CHANGE UP (ref 3.5–5)
POTASSIUM SERPL-SCNC: 4.7 MMOL/L — SIGNIFICANT CHANGE UP (ref 3.5–5)
PROT SERPL-MCNC: 5.6 G/DL — LOW (ref 6–8)
RBC # BLD: 4.57 M/UL — LOW (ref 4.7–6.1)
RBC # FLD: 11.5 % — SIGNIFICANT CHANGE UP (ref 11.5–14.5)
SODIUM SERPL-SCNC: 142 MMOL/L — SIGNIFICANT CHANGE UP (ref 135–146)
WBC # BLD: 9.34 K/UL — SIGNIFICANT CHANGE UP (ref 4.8–10.8)
WBC # FLD AUTO: 9.34 K/UL — SIGNIFICANT CHANGE UP (ref 4.8–10.8)

## 2025-02-03 RX ADMIN — Medication 2 TABLET(S): at 13:06

## 2025-02-03 RX ADMIN — Medication 5 MILLIGRAM(S): at 06:13

## 2025-02-03 RX ADMIN — ENTACAPONE 200 MILLIGRAM(S): 200 TABLET, FILM COATED ORAL at 13:06

## 2025-02-03 RX ADMIN — ASPIRIN 81 MILLIGRAM(S): 81 TABLET, COATED ORAL at 13:06

## 2025-02-03 RX ADMIN — LEVETIRACETAM 500 MILLIGRAM(S): 750 TABLET, FILM COATED ORAL at 06:13

## 2025-02-03 RX ADMIN — Medication 75 MILLIGRAM(S): at 13:06

## 2025-02-03 RX ADMIN — RIVASTIGMINE 1 PATCH: 4.6 PATCH, EXTENDED RELEASE TRANSDERMAL at 06:11

## 2025-02-03 RX ADMIN — Medication 1 TABLET(S): at 17:06

## 2025-02-03 RX ADMIN — ENOXAPARIN SODIUM 40 MILLIGRAM(S): 100 INJECTION SUBCUTANEOUS at 06:09

## 2025-02-03 RX ADMIN — MIRTAZAPINE 7.5 MILLIGRAM(S): 30 TABLET, FILM COATED ORAL at 13:07

## 2025-02-03 RX ADMIN — ENTACAPONE 200 MILLIGRAM(S): 200 TABLET, FILM COATED ORAL at 06:16

## 2025-02-03 RX ADMIN — ENTACAPONE 200 MILLIGRAM(S): 200 TABLET, FILM COATED ORAL at 21:09

## 2025-02-03 RX ADMIN — RIVASTIGMINE 1 PATCH: 4.6 PATCH, EXTENDED RELEASE TRANSDERMAL at 17:12

## 2025-02-03 RX ADMIN — ANTISEPTIC SURGICAL SCRUB 1 APPLICATION(S): 0.04 SOLUTION TOPICAL at 06:14

## 2025-02-03 RX ADMIN — ATORVASTATIN CALCIUM 80 MILLIGRAM(S): 80 TABLET, FILM COATED ORAL at 21:09

## 2025-02-03 RX ADMIN — LEVETIRACETAM 500 MILLIGRAM(S): 750 TABLET, FILM COATED ORAL at 17:06

## 2025-02-03 RX ADMIN — Medication 3.12 MILLIGRAM(S): at 17:07

## 2025-02-03 RX ADMIN — Medication 50 MILLIGRAM(S): at 21:09

## 2025-02-03 RX ADMIN — Medication 3.12 MILLIGRAM(S): at 06:14

## 2025-02-03 RX ADMIN — Medication 1: at 17:05

## 2025-02-03 RX ADMIN — Medication 2 TABLET(S): at 06:13

## 2025-02-03 RX ADMIN — Medication 2 TABLET(S): at 21:09

## 2025-02-03 NOTE — PROGRESS NOTE ADULT - ASSESSMENT
74 M PMH of Parkinson's, HTN, hx ischemic CVA x2, type 2 DM, dementia and chronic back pain, Hx of seizures on keppra BIBA from [place] facility  or generalized weakness, confusion, decreased PO intake and multiple witnessed falls over the past few days. He had two witnessed falls the day prior to admission where he was weak and slumped to the ground. No LOC, not on AC. Per staff, he is normally alert and oriented x 3 and able to perform ADLs, but for the past few days has been increasingly confused. He was evaluated in the ED the day prior for similar complaints, had a negative work up and was discharged. Infectious and trauma workup negative. On 1/21 patient was notes to have expressive aphasia and R facial droop. Evaluated by neurology, MRI ordered and showed acute stroke in the L corona radiata. CTA Head/Neck showed severe stenosis left MCA proximal M2. Moderate narrowing right proximal vertebral artery On 1/22 new onset L tongue deviation and mild dysarthria - repeat MRI brain with no new acute findings. ILR placed 1/24. NIHSS 4 on admission.    #Rehab for gait abnormality, dysarthria and expressive aphasia iso acute L corona radiata stroke  -Imaging as above  -s/p ILR 1/24  -s/p  EEG 1/22: generalized slowing, no seizures  - A1C 4.9, , TSH 2.81  -c/w ASA and Plavix for 90 days  -c/w Lipitor 80 mg qhs  -PT/OT/SLP/Neuropsych eval and treat   - mobility and ADLs and cognition improving    #HTN  -c/w amlodipine 5 mg daily  -c/w Coreg 3.125 mg q12h  -Monitor BP, stable in good range     #HLD  -c/w Lipitor 80 mg qhs    #DM2  - A1C with Estimated Average Glucose Result: 4.9: Method: Immunoassay - well controlled on no meds at this time.  - on low dose gabapentin at night. No c/o of pain or dysesthesias. d/c and monitor.    #Hx of seizures  -c/w Keppra 500 bid    #Hx of Parkinson’s  #Hx of dementia. Mild and does not interfere with patient's ability to participate in and benefit from acute rehab.  -c/w home meds sinemet, entacapone, and rivastigmine     #Anxiety/depression  #Insomnia  -c/w mirtazapine 7.5 daily  -was on Trazadone 100mg qhs - given increased confusion, decreased to 50 mg hs   -c/w melatonin 3 mg qhs prn  - sleeping well and more alert during the day     -Pain control: tylenol prn    -GI/Bowel Mgmt: senna/Miralax    - Diet: Easy to chew with thins DASH/carb consistent           Precautions / PROPHYLAXIS:      - Falls      - DVT prophylaxis: Lovenox

## 2025-02-03 NOTE — PROGRESS NOTE ADULT - SUBJECTIVE AND OBJECTIVE BOX
Patient is a 74 y old male who presents with a chief complaint of acute stroke and Parkinson's Syndrome with decline in function (24 Jan 2025 13:28)      HPI:    74 M PMH of Parkinson's, HTN, hx ischemic CVA x2, type 2 DM, dementia and chronic back pain, Hx of seizures on keppra BIBA from [place] facility  or generalized weakness, confusion, decreased PO intake and multiple witnessed falls over the past few days. He had two witnessed falls the day prior to admission where he was weak and slumped to the ground. No LOC, not on AC. Per staff, he is normally alert and oriented x 3 and able to perform ADLs, but for the past few days has been increasingly confused. He was evaluated in the ED the day prior for similar complaints, had a negative work up and was discharged. Infectious and trauma workup negative. On 1/21 patient was notes to have expressive aphasia and R facial droop. Evaluated by neurology, MRI ordered and showed acute stroke in the L corona radiata. CTA Head/Neck showed severe stenosis left MCA proximal M2. Moderate narrowing right proximal vertebral artery On 1/22 new onset L tongue deviation and mild dysarthria - repeat MRI brain with no new acute findings. ILR placed 1/24.    Prior to admission, the patient was independent in ADLs and ambulation without an assistive device Patient now requires Reza for bed mobility, modA for transfers, ambulating 40’x2 with RW and modA,  modA for LBD. Therefore, there is a significant functional decline from baseline. Patients also with medical comorbidities of DM, HTN, requiring medication management and monitoring of vitals by Physiatry team and nursing. To return home it is in the patient’s best interest to have acute inpatient rehabilitative services to undergo intensive interdisciplinary therapy. Furthermore, the patient is motivated and is able to tolerate up to 3 hours of daily therapy for 5-6 days a week for a total of 15 hours a week. Evaluated by Physiatry and deemed to be an excellent candidate for admission to  for acute inpatient rehab. Admitted to Acute Rehab on 1/24/25    LS: Lives alone in a private home, 6STE  PLOF: independent in ADLs and ambulation with no AS     (24 Jan 2025 13:28)    TODAY'S SUBJECTIVE & REVIEW OF SYMPTOMS:  Patient seen at bedside. No acute events overnight. Doing well. Offers no complaints. Tolerating therapies. Vitals and labs reviewed.     CLOF: Reza for bed mobility, min A for transfers, ambulating 75 ft. with RW and min A, dressing with min assist       Constitutional:    [   ] WNL           [   ] poor appetite   [   ] insomnia   [   ] tired   [ x] weak   Cardio:                [ x  ] WNL           [   ] CP   [   ] SY   [   ] palpitations               Resp:                   [ x  ] WNL           [   ] SOB   [   ] cough   [   ] wheezing   GI:                        [  x ] WNL           [   ] constipation   [   ] diarrhea   [   ] abdominal pain   [   ] nausea   [   ] emesis                                :                      [   x] WNL           [   ] LAM  [   ] dysuria   [   ] difficulty voiding             Endo:                   [  x ] WNL          [   ] polyuria   [   ] temperature intolerance                 Skin:                     [  x ] WNL          [   ] pain   [   ] wound   [   ] rash   MSK:                    [  x ] WNL          [   ] muscle pain   [   ] joint pain/ stiffness   [   ] muscle tenderness   [   ] swelling   Neuro:                 [ x  ] WNL          [   ] HA   [   ] change in vision   [   ] tremor   [   ] weakness   [   ]dysphagia              Cognitive:           [   ] WNL           [ x  ]confusion at times - feels 'off'   Psych:                  [   ] WNL           [   ] hallucinations   [   ]agitation   [   ] delusion   [ x  ]depression/ anxiety    PHYSICAL EXAM    Vital Signs Last 24 Hrs  T(C): 36.7 (03 Feb 2025 05:00), Max: 36.7 (03 Feb 2025 05:00)  T(F): 98.1 (03 Feb 2025 05:00), Max: 98.1 (03 Feb 2025 05:00)  HR: 69 (03 Feb 2025 05:00) (68 - 69)  BP: 129/85 (03 Feb 2025 05:00) (123/74 - 129/85)  BP(mean): --  RR: 18 (03 Feb 2025 05:00) (18 - 18)  SpO2: 98% (03 Feb 2025 05:00) (98% - 98%)      General:[x   ] NAD, Resting Comfortable,   [   ] other:                                HEENT: [ x  ] NC/AT, EOMI, PERRL , Normal Conjunctivae,   [   ] other:  Cardio: [  x ] RRR, no murmur   [   ] other:                              Pulm: [  x ] No Respiratory Distress,  Lungs CTAB,   [   ] other:                       Abdomen: [x   ]ND/NT, Soft,   [   ] other:    : [ x  ] NO LAM CATHETER, [   ] LAM CATHETER- no meatal tear, no discharge, [   ] other:                                            MSK: [x   ] No joint swelling, Full ROM,   [   ] other:                                         Ext: [  x ]No C/C/E, No calf tenderness,   [   ]other:    Skin: [ x  ]intact,   [   ] other:                                                                   Neurological Examination:  Cognitive: [    ] AAO x 3,   [ x   ]  other: AOx2 - not place                                                                     Attention:  [  x  ] intact,   [  ]  other:                         Memory: [    ] intact,    [ x   ]  other:   2/3 delayed recall with verbal cues  Mood/Affect: [  x  ] wnl,    [    ]  other:                                                                             Communication: [    ]Fluent, no dysarthria, following commands:  [   x ] other: mild dysarthria, expressive aphasia, mild anomia, hypophonia   CN II - XII:  [    ] intact,  [ x   ] other: R facial droop                                                                                        Motor:   RIGHT UE: [   ] WNL,  [x   ] other: 4/5  LEFT    UE: [  x ] WNL,  [   ] other:  RIGHT LE: [   ] WNL,  [x   ] other: 4/5  LEFT    LE: [x   ] WNL,  [   ] other:    Tone: [  x ] wnl,   [    ]  other:  DTRs: [   ]symmetric, [ x  ] other: R patellar reflex brisk 3+  Coordination:   [x    ] intact,   [    ] other:                                                                           Sensory: [   x ] Intact to light touch,   [    ] other:    MEDICATIONS  (STANDING):  amLODIPine   Tablet 5 milliGRAM(s) Oral daily  aspirin  chewable 81 milliGRAM(s) Oral daily  atorvastatin 80 milliGRAM(s) Oral at bedtime  carbidopa/levodopa  25/100 2 Tablet(s) Oral three times a day  carbidopa/levodopa CR 50/200 1 Tablet(s) Oral <User Schedule>  carvedilol 3.125 milliGRAM(s) Oral every 12 hours  chlorhexidine 2% Cloths 1 Application(s) Topical <User Schedule>  clopidogrel Tablet 75 milliGRAM(s) Oral daily  dextrose 5%. 1000 milliLiter(s) (100 mL/Hr) IV Continuous <Continuous>  dextrose 5%. 1000 milliLiter(s) (50 mL/Hr) IV Continuous <Continuous>  dextrose 50% Injectable 25 Gram(s) IV Push once  enoxaparin Injectable 40 milliGRAM(s) SubCutaneous every 24 hours  entacapone 200 milliGRAM(s) Oral three times a day  glucagon  Injectable 1 milliGRAM(s) IntraMuscular once  insulin lispro (ADMELOG) corrective regimen sliding scale   SubCutaneous three times a day before meals  levETIRAcetam 500 milliGRAM(s) Oral two times a day  mirtazapine 7.5 milliGRAM(s) Oral daily  polyethylene glycol 3350 17 Gram(s) Oral daily  rivastigmine patch  9.5 mG/24 Hr(s). 1 Patch Transdermal every 24 hours  senna 2 Tablet(s) Oral at bedtime  traZODone 50 milliGRAM(s) Oral at bedtime    MEDICATIONS  (PRN):  acetaminophen     Tablet .. 650 milliGRAM(s) Oral every 6 hours PRN Temp greater or equal to 38C (100.4F), Mild Pain (1 - 3)  aluminum hydroxide/magnesium hydroxide/simethicone Suspension 30 milliLiter(s) Oral every 4 hours PRN Dyspepsia  dextrose Oral Gel 15 Gram(s) Oral once PRN Blood Glucose LESS THAN 70 milliGRAM(s)/deciliter  magnesium hydroxide Suspension 30 milliLiter(s) Oral daily PRN Constipation  melatonin 3 milliGRAM(s) Oral at bedtime PRN Insomnia            RECENT LABS/IMAGING                          15.4   9.34  )-----------( 173      ( 03 Feb 2025 06:27 )             45.3     02-03    142  |  105  |  17  ----------------------------<  74  4.7   |  23  |  1.1    Ca    8.7      03 Feb 2025 06:27  Mg     2.1     02-03    TPro  5.6[L]  /  Alb  3.7  /  TBili  0.7  /  DBili  x   /  AST  23  /  ALT  11  /  AlkPhos  93  02-03      POCT Blood Glucose.: 145 mg/dL (02-03-25 @ 11:47)  POCT Blood Glucose.: 106 mg/dL (02-03-25 @ 07:30)  POCT Blood Glucose.: 156 mg/dL (02-02-25 @ 21:52)  POCT Blood Glucose.: 128 mg/dL (02-02-25 @ 16:03)  POCT Blood Glucose.: 169 mg/dL (02-02-25 @ 11:19)  POCT Blood Glucose.: 139 mg/dL (02-02-25 @ 07:29)  POCT Blood Glucose.: 155 mg/dL (02-01-25 @ 21:01)  POCT Blood Glucose.: 95 mg/dL (02-01-25 @ 16:21)  POCT Blood Glucose.: 285 mg/dL (02-01-25 @ 11:27)  POCT Blood Glucose.: 99 mg/dL (02-01-25 @ 07:33)  POCT Blood Glucose.: 123 mg/dL (01-31-25 @ 20:46)  POCT Blood Glucose.: 102 mg/dL (01-31-25 @ 16:23)

## 2025-02-04 LAB
GLUCOSE BLDC GLUCOMTR-MCNC: 201 MG/DL — HIGH (ref 70–99)
GLUCOSE BLDC GLUCOMTR-MCNC: 247 MG/DL — HIGH (ref 70–99)
GLUCOSE BLDC GLUCOMTR-MCNC: 96 MG/DL — SIGNIFICANT CHANGE UP (ref 70–99)
GLUCOSE BLDC GLUCOMTR-MCNC: 97 MG/DL — SIGNIFICANT CHANGE UP (ref 70–99)

## 2025-02-04 RX ADMIN — Medication 2 TABLET(S): at 05:58

## 2025-02-04 RX ADMIN — Medication 3.12 MILLIGRAM(S): at 18:01

## 2025-02-04 RX ADMIN — Medication 1 TABLET(S): at 18:01

## 2025-02-04 RX ADMIN — ATORVASTATIN CALCIUM 80 MILLIGRAM(S): 80 TABLET, FILM COATED ORAL at 21:17

## 2025-02-04 RX ADMIN — ASPIRIN 81 MILLIGRAM(S): 81 TABLET, COATED ORAL at 12:01

## 2025-02-04 RX ADMIN — Medication 5 MILLIGRAM(S): at 05:57

## 2025-02-04 RX ADMIN — Medication 75 MILLIGRAM(S): at 11:59

## 2025-02-04 RX ADMIN — POLYETHYLENE GLYCOL 3350 17 GRAM(S): 17 POWDER, FOR SOLUTION ORAL at 11:59

## 2025-02-04 RX ADMIN — ANTISEPTIC SURGICAL SCRUB 1 APPLICATION(S): 0.04 SOLUTION TOPICAL at 06:02

## 2025-02-04 RX ADMIN — RIVASTIGMINE 1 PATCH: 4.6 PATCH, EXTENDED RELEASE TRANSDERMAL at 05:54

## 2025-02-04 RX ADMIN — Medication 2: at 16:18

## 2025-02-04 RX ADMIN — Medication 3.12 MILLIGRAM(S): at 05:58

## 2025-02-04 RX ADMIN — ENTACAPONE 200 MILLIGRAM(S): 200 TABLET, FILM COATED ORAL at 21:17

## 2025-02-04 RX ADMIN — Medication 50 MILLIGRAM(S): at 21:17

## 2025-02-04 RX ADMIN — Medication 2 TABLET(S): at 21:17

## 2025-02-04 RX ADMIN — ENTACAPONE 200 MILLIGRAM(S): 200 TABLET, FILM COATED ORAL at 05:58

## 2025-02-04 RX ADMIN — LEVETIRACETAM 500 MILLIGRAM(S): 750 TABLET, FILM COATED ORAL at 18:01

## 2025-02-04 RX ADMIN — MIRTAZAPINE 7.5 MILLIGRAM(S): 30 TABLET, FILM COATED ORAL at 12:00

## 2025-02-04 RX ADMIN — Medication 2 TABLET(S): at 12:00

## 2025-02-04 RX ADMIN — Medication 2 TABLET(S): at 21:16

## 2025-02-04 RX ADMIN — Medication 2: at 11:58

## 2025-02-04 RX ADMIN — ENOXAPARIN SODIUM 40 MILLIGRAM(S): 100 INJECTION SUBCUTANEOUS at 05:58

## 2025-02-04 RX ADMIN — LEVETIRACETAM 500 MILLIGRAM(S): 750 TABLET, FILM COATED ORAL at 05:58

## 2025-02-04 RX ADMIN — ENTACAPONE 200 MILLIGRAM(S): 200 TABLET, FILM COATED ORAL at 11:59

## 2025-02-04 NOTE — PROGRESS NOTE ADULT - ATTENDING COMMENTS
I reviewed the chart and examined the patient with the resident and we discussed the findings and treatment plan.  The patient is tolerating the rehab program well. I agree with the findings and treatment plan above, which I modified as indicated. The patient requires 3 hrs a day of acute inpatient rehab. No new complaints. Alert. VSS. Neuro stable. Cognition improving. Continue full rehab program.    I read, edited and agree with the physical exam above and assessment:  #Rehab for gait abnormality, dysarthria and expressive aphasia iso acute L corona radiata stroke  -Imaging as above  -s/p ILR 1/24  -s/p  EEG 1/22: generalized slowing, no seizures  - A1C 4.9, , TSH 2.81  -c/w ASA and Plavix for 90 days  -c/w Lipitor 80 mg qhs  -PT/OT/SLP/Neuropsych eval and treat   - mobility and ADLs and cognition improving    #HTN  -c/w amlodipine 5 mg daily  -c/w Coreg 3.125 mg q12h  -Monitor BP, stable in good range     #HLD  -c/w Lipitor 80 mg qhs    #DM2  - A1C with Estimated Average Glucose Result: 4.9: Method: Immunoassay - well controlled on no meds at this time.  - on low dose gabapentin at night. No c/o of pain or dysesthesias. d/c and monitor.    #Hx of seizures  -c/w Keppra 500 bid    #Hx of Parkinson’s Syndrome  #Hx of dementia. Mild and does not interfere with patient's ability to participate in and benefit from acute rehab.  -c/w home meds sinemet, entacapone, and rivastigmine     #Anxiety/depression  #Insomnia  -c/w mirtazapine 7.5 daily  -was on Trazadone 100mg qhs - given increased confusion, decreased to 50 mg hs   -c/w melatonin 3 mg qhs prn  - sleeping well and more alert during the day     -Pain control: tylenol prn    -GI/Bowel Mgmt: senna/Miralax    - Diet: Easy to chew with thins DASH/carb consistent

## 2025-02-04 NOTE — PROGRESS NOTE ADULT - ASSESSMENT
74 M PMH of Parkinson's, HTN, hx ischemic CVA x2, type 2 DM, dementia and chronic back pain, Hx of seizures on keppra BIBA from [place] facility  or generalized weakness, confusion, decreased PO intake and multiple witnessed falls over the past few days. He had two witnessed falls the day prior to admission where he was weak and slumped to the ground. No LOC, not on AC. Per staff, he is normally alert and oriented x 3 and able to perform ADLs, but for the past few days has been increasingly confused. He was evaluated in the ED the day prior for similar complaints, had a negative work up and was discharged. Infectious and trauma workup negative. On 1/21 patient was notes to have expressive aphasia and R facial droop. Evaluated by neurology, MRI ordered and showed acute stroke in the L corona radiata. CTA Head/Neck showed severe stenosis left MCA proximal M2. Moderate narrowing right proximal vertebral artery On 1/22 new onset L tongue deviation and mild dysarthria - repeat MRI brain with no new acute findings. ILR placed 1/24. NIHSS 4 on admission.    #Rehab for gait abnormality, dysarthria and expressive aphasia iso acute L corona radiata stroke  -Imaging as above  -s/p ILR 1/24  -s/p  EEG 1/22: generalized slowing, no seizures  - A1C 4.9, , TSH 2.81  -c/w ASA and Plavix for 90 days  -c/w Lipitor 80 mg qhs  -PT/OT/SLP/Neuropsych eval and treat   - mobility and ADLs and cognition improving    #HTN  -c/w amlodipine 5 mg daily  -c/w Coreg 3.125 mg q12h  -Monitor BP, stable in good range     #HLD  -c/w Lipitor 80 mg qhs    #DM2  - A1C with Estimated Average Glucose Result: 4.9: Method: Immunoassay - well controlled on no meds at this time.  - on low dose gabapentin at night. No c/o of pain or dysesthesias. d/c and monitor.    #Hx of seizures  -c/w Keppra 500 bid    #Hx of Parkinson’s  #Hx of dementia. Mild and does not interfere with patient's ability to participate in and benefit from acute rehab.  -c/w home meds sinemet, entacapone, and rivastigmine     #Anxiety/depression  #Insomnia  -c/w mirtazapine 7.5 daily  -was on Trazadone 100mg qhs - given increased confusion, decreased to 50 mg hs   -c/w melatonin 3 mg qhs prn  - sleeping well and more alert during the day     -Pain control: tylenol prn    -GI/Bowel Mgmt: senna/Miralax    - Diet: Easy to chew with thins DASH/carb consistent           Precautions / PROPHYLAXIS:      - Falls      - DVT prophylaxis: Lovenox     74 M PMH of Parkinson's, HTN, hx ischemic CVA x2, type 2 DM, dementia and chronic back pain, Hx of seizures on keppra BIBA from [place] facility  or generalized weakness, confusion, decreased PO intake and multiple witnessed falls over the past few days. He had two witnessed falls the day prior to admission where he was weak and slumped to the ground. No LOC, not on AC. Per staff, he is normally alert and oriented x 3 and able to perform ADLs, but for the past few days has been increasingly confused. He was evaluated in the ED the day prior for similar complaints, had a negative work up and was discharged. Infectious and trauma workup negative. On 1/21 patient was notes to have expressive aphasia and R facial droop. Evaluated by neurology, MRI ordered and showed acute stroke in the L corona radiata. CTA Head/Neck showed severe stenosis left MCA proximal M2. Moderate narrowing right proximal vertebral artery On 1/22 new onset L tongue deviation and mild dysarthria - repeat MRI brain with no new acute findings. ILR placed 1/24. NIHSS 4 on admission.    #Rehab for gait abnormality, dysarthria and expressive aphasia iso acute L corona radiata stroke  -Imaging as above  -s/p ILR 1/24  -s/p  EEG 1/22: generalized slowing, no seizures  - A1C 4.9, , TSH 2.81  -c/w ASA and Plavix for 90 days  -c/w Lipitor 80 mg qhs  -PT/OT/SLP/Neuropsych eval and treat   - mobility and ADLs and cognition improving    #HTN  -c/w amlodipine 5 mg daily  -c/w Coreg 3.125 mg q12h  -Monitor BP, stable in good range     #HLD  -c/w Lipitor 80 mg qhs    #DM2  - A1C with Estimated Average Glucose Result: 4.9: Method: Immunoassay - well controlled on no meds at this time.  - on low dose gabapentin at night. No c/o of pain or dysesthesias. d/c and monitor.    #Hx of seizures  -c/w Keppra 500 bid    #Hx of Parkinson’s Syndrome  #Hx of dementia. Mild and does not interfere with patient's ability to participate in and benefit from acute rehab.  -c/w home meds sinemet, entacapone, and rivastigmine     #Anxiety/depression  #Insomnia  -c/w mirtazapine 7.5 daily  -was on Trazadone 100mg qhs - given increased confusion, decreased to 50 mg hs   -c/w melatonin 3 mg qhs prn  - sleeping well and more alert during the day     -Pain control: tylenol prn    -GI/Bowel Mgmt: senna/Miralax    - Diet: Easy to chew with thins DASH/carb consistent           Precautions / PROPHYLAXIS:      - Falls      - DVT prophylaxis: Lovenox

## 2025-02-04 NOTE — PROGRESS NOTE ADULT - SUBJECTIVE AND OBJECTIVE BOX
Patient is a 74 y old male who presents with a chief complaint of acute stroke and Parkinson's Syndrome with decline in function (24 Jan 2025 13:28)      HPI:    74 M PMH of Parkinson's, HTN, hx ischemic CVA x2, type 2 DM, dementia and chronic back pain, Hx of seizures on keppra BIBA from [facility] facility  or generalized weakness, confusion, decreased PO intake and multiple witnessed falls over the past few days. He had two witnessed falls the day prior to admission where he was weak and slumped to the ground. No LOC, not on AC. Per staff, he is normally alert and oriented x 3 and able to perform ADLs, but for the past few days has been increasingly confused. He was evaluated in the ED the day prior for similar complaints, had a negative work up and was discharged. Infectious and trauma workup negative. On 1/21 patient was notes to have expressive aphasia and R facial droop. Evaluated by neurology, MRI ordered and showed acute stroke in the L corona radiata. CTA Head/Neck showed severe stenosis left MCA proximal M2. Moderate narrowing right proximal vertebral artery On 1/22 new onset L tongue deviation and mild dysarthria - repeat MRI brain with no new acute findings. ILR placed 1/24.    Prior to admission, the patient was independent in ADLs and ambulation without an assistive device Patient now requires Reza for bed mobility, modA for transfers, ambulating 40’x2 with RW and modA,  modA for LBD. Therefore, there is a significant functional decline from baseline. Patients also with medical comorbidities of DM, HTN, requiring medication management and monitoring of vitals by Physiatry team and nursing. To return home it is in the patient’s best interest to have acute inpatient rehabilitative services to undergo intensive interdisciplinary therapy. Furthermore, the patient is motivated and is able to tolerate up to 3 hours of daily therapy for 5-6 days a week for a total of 15 hours a week. Evaluated by Physiatry and deemed to be an excellent candidate for admission to  for acute inpatient rehab. Admitted to Acute Rehab on 1/24/25    LS: Lives alone in a private home, 6STE  PLOF: independent in ADLs and ambulation with no AS     (24 Jan 2025 13:28)    TODAY'S SUBJECTIVE & REVIEW OF SYMPTOMS:  Patient was seen and assessed at bedside.   No overnight events.   Patient denies any new complaints at this time. continues to be on 1:1  Tolerating PT/OT.   Tolerating oral diet.   Voiding spontaneously.   Regular BM  Vital signs reviewed.      CLOF: Reza for bed mobility, min A for transfers, ambulating 75 ft. with RW and min A, dressing with min assist       Constitutional:    [   ] WNL           [   ] poor appetite   [   ] insomnia   [   ] tired   [ x] weak   Cardio:                [ x  ] WNL           [   ] CP   [   ] SY   [   ] palpitations               Resp:                   [ x  ] WNL           [   ] SOB   [   ] cough   [   ] wheezing   GI:                        [  x ] WNL           [   ] constipation   [   ] diarrhea   [   ] abdominal pain   [   ] nausea   [   ] emesis                                :                      [   x] WNL           [   ] LAM  [   ] dysuria   [   ] difficulty voiding             Endo:                   [  x ] WNL          [   ] polyuria   [   ] temperature intolerance                 Skin:                     [  x ] WNL          [   ] pain   [   ] wound   [   ] rash   MSK:                    [  x ] WNL          [   ] muscle pain   [   ] joint pain/ stiffness   [   ] muscle tenderness   [   ] swelling   Neuro:                 [ x  ] WNL          [   ] HA   [   ] change in vision   [   ] tremor   [   ] weakness   [   ]dysphagia              Cognitive:           [   ] WNL           [ x  ]confusion at times - feels 'off'   Psych:                  [   ] WNL           [   ] hallucinations   [   ]agitation   [   ] delusion   [ x  ]depression/ anxiety    PHYSICAL EXAM    Vital Signs Last 24 Hrs  T(C): 36.6 (02-04-25 @ 05:50), Max: 36.6 (02-04-25 @ 05:50)  T(F): 97.9 (02-04-25 @ 05:50), Max: 97.9 (02-04-25 @ 05:50)  HR: 62 (02-04-25 @ 05:50) (62 - 85)  BP: 124/68 (02-04-25 @ 05:50) (114/66 - 124/68)  ABP: --  ABP(mean): --  RR: 19 (02-04-25 @ 05:50) (19 - 19)  SpO2: 98% (02-04-25 @ 05:50) (96% - 98%)        General:[x   ] NAD, Resting Comfortable,   [   ] other:                                HEENT: [ x  ] NC/AT, EOMI, PERRL , Normal Conjunctivae,   [   ] other:  Cardio: [  x ] RRR, no murmur   [   ] other:                              Pulm: [  x ] No Respiratory Distress,  Lungs CTAB,   [   ] other:                       Abdomen: [x   ]ND/NT, Soft,   [   ] other:    : [ x  ] NO LAM CATHETER, [   ] LAM CATHETER- no meatal tear, no discharge, [   ] other:                                            MSK: [x   ] No joint swelling, Full ROM,   [   ] other:                                         Ext: [  x ]No C/C/E, No calf tenderness,   [   ]other:    Skin: [ x  ]intact,   [   ] other:                                                                   Neurological Examination:  Cognitive: [    ] AAO x 3,   [ x   ]  other: AOx2 - not place                                                                     Attention:  [  x  ] intact,   [  ]  other:                         Memory: [    ] intact,    [ x   ]  other:   2/3 delayed recall with verbal cues  Mood/Affect: [  x  ] wnl,    [    ]  other:                                                                             Communication: [    ]Fluent, no dysarthria, following commands:  [   x ] other: mild dysarthria, expressive aphasia, mild anomia, hypophonia   CN II - XII:  [    ] intact,  [ x   ] other: R facial droop                                                                                        Motor:   RIGHT UE: [   ] WNL,  [x   ] other: 4/5  LEFT    UE: [  x ] WNL,  [   ] other:  RIGHT LE: [   ] WNL,  [x   ] other: 4/5  LEFT    LE: [x   ] WNL,  [   ] other:    Tone: [  x ] wnl,   [    ]  other:  DTRs: [   ]symmetric, [ x  ] other: R patellar reflex brisk 3+  Coordination:   [x    ] intact,   [    ] other:                                                                           Sensory: [   x ] Intact to light touch,   [    ] other:    MEDICATIONS  (STANDING):  amLODIPine   Tablet 5 milliGRAM(s) Oral daily  aspirin  chewable 81 milliGRAM(s) Oral daily  atorvastatin 80 milliGRAM(s) Oral at bedtime  carbidopa/levodopa  25/100 2 Tablet(s) Oral three times a day  carbidopa/levodopa CR 50/200 1 Tablet(s) Oral <User Schedule>  carvedilol 3.125 milliGRAM(s) Oral every 12 hours  chlorhexidine 2% Cloths 1 Application(s) Topical <User Schedule>  clopidogrel Tablet 75 milliGRAM(s) Oral daily  dextrose 5%. 1000 milliLiter(s) (100 mL/Hr) IV Continuous <Continuous>  dextrose 5%. 1000 milliLiter(s) (50 mL/Hr) IV Continuous <Continuous>  dextrose 50% Injectable 25 Gram(s) IV Push once  enoxaparin Injectable 40 milliGRAM(s) SubCutaneous every 24 hours  entacapone 200 milliGRAM(s) Oral three times a day  glucagon  Injectable 1 milliGRAM(s) IntraMuscular once  insulin lispro (ADMELOG) corrective regimen sliding scale   SubCutaneous three times a day before meals  levETIRAcetam 500 milliGRAM(s) Oral two times a day  mirtazapine 7.5 milliGRAM(s) Oral daily  polyethylene glycol 3350 17 Gram(s) Oral daily  rivastigmine patch  9.5 mG/24 Hr(s). 1 Patch Transdermal every 24 hours  senna 2 Tablet(s) Oral at bedtime  traZODone 50 milliGRAM(s) Oral at bedtime    MEDICATIONS  (PRN):  acetaminophen     Tablet .. 650 milliGRAM(s) Oral every 6 hours PRN Temp greater or equal to 38C (100.4F), Mild Pain (1 - 3)  aluminum hydroxide/magnesium hydroxide/simethicone Suspension 30 milliLiter(s) Oral every 4 hours PRN Dyspepsia  dextrose Oral Gel 15 Gram(s) Oral once PRN Blood Glucose LESS THAN 70 milliGRAM(s)/deciliter  magnesium hydroxide Suspension 30 milliLiter(s) Oral daily PRN Constipation  melatonin 3 milliGRAM(s) Oral at bedtime PRN Insomnia            RECENT LABS/IMAGING                          15.4   9.34  )-----------( 173      ( 03 Feb 2025 06:27 )             45.3     02-03    142  |  105  |  17  ----------------------------<  74  4.7   |  23  |  1.1    Ca    8.7      03 Feb 2025 06:27  Mg     2.1     02-03    TPro  5.6[L]  /  Alb  3.7  /  TBili  0.7  /  DBili  x   /  AST  23  /  ALT  11  /  AlkPhos  93  02-03      POCT Blood Glucose.: 145 mg/dL (02-03-25 @ 11:47)  POCT Blood Glucose.: 106 mg/dL (02-03-25 @ 07:30)  POCT Blood Glucose.: 156 mg/dL (02-02-25 @ 21:52)  POCT Blood Glucose.: 128 mg/dL (02-02-25 @ 16:03)  POCT Blood Glucose.: 169 mg/dL (02-02-25 @ 11:19)  POCT Blood Glucose.: 139 mg/dL (02-02-25 @ 07:29)  POCT Blood Glucose.: 155 mg/dL (02-01-25 @ 21:01)  POCT Blood Glucose.: 95 mg/dL (02-01-25 @ 16:21)  POCT Blood Glucose.: 285 mg/dL (02-01-25 @ 11:27)  POCT Blood Glucose.: 99 mg/dL (02-01-25 @ 07:33)  POCT Blood Glucose.: 123 mg/dL (01-31-25 @ 20:46)  POCT Blood Glucose.: 102 mg/dL (01-31-25 @ 16:23)       Patient is a 74 y old male who presents with a chief complaint of acute stroke and Parkinson's Syndrome with decline in function (24 Jan 2025 13:28)      HPI:    74 M PMH of Parkinson's, HTN, hx ischemic CVA x2, type 2 DM, dementia and chronic back pain, Hx of seizures on keppra BIBA from [facility] facility  or generalized weakness, confusion, decreased PO intake and multiple witnessed falls over the past few days. He had two witnessed falls the day prior to admission where he was weak and slumped to the ground. No LOC, not on AC. Per staff, he is normally alert and oriented x 3 and able to perform ADLs, but for the past few days has been increasingly confused. He was evaluated in the ED the day prior for similar complaints, had a negative work up and was discharged. Infectious and trauma workup negative. On 1/21 patient was notes to have expressive aphasia and R facial droop. Evaluated by neurology, MRI ordered and showed acute stroke in the L corona radiata. CTA Head/Neck showed severe stenosis left MCA proximal M2. Moderate narrowing right proximal vertebral artery On 1/22 new onset L tongue deviation and mild dysarthria - repeat MRI brain with no new acute findings. ILR placed 1/24.    Prior to admission, the patient was independent in ADLs and ambulation without an assistive device Patient now requires Reza for bed mobility, modA for transfers, ambulating 40’x2 with RW and modA,  modA for LBD. Therefore, there is a significant functional decline from baseline. Patients also with medical comorbidities of DM, HTN, requiring medication management and monitoring of vitals by Physiatry team and nursing. To return home it is in the patient’s best interest to have acute inpatient rehabilitative services to undergo intensive interdisciplinary therapy. Furthermore, the patient is motivated and is able to tolerate up to 3 hours of daily therapy for 5-6 days a week for a total of 15 hours a week. Evaluated by Physiatry and deemed to be an excellent candidate for admission to  for acute inpatient rehab. Admitted to Acute Rehab on 1/24/25    LS: Lives alone in a private home, 6STE  PLOF: independent in ADLs and ambulation with no AS     (24 Jan 2025 13:28)    TODAY'S SUBJECTIVE & REVIEW OF SYMPTOMS:  Patient was seen and assessed at bedside.   No overnight events.   Patient denies any new complaints at this time. continues to be on 1:1  Tolerating PT/OT.   Tolerating oral diet.   Voiding spontaneously.   Regular BM  Vital signs reviewed.      CLOF: Reza for bed mobility, min A for transfers, ambulating 75 ft. with RW and min A, dressing with min assist       Constitutional:    [   ] WNL           [   ] poor appetite   [   ] insomnia   [   ] tired   [ x] weak   Cardio:                [ x  ] WNL           [   ] CP   [   ] SY   [   ] palpitations               Resp:                   [ x  ] WNL           [   ] SOB   [   ] cough   [   ] wheezing   GI:                        [  x ] WNL           [   ] constipation   [   ] diarrhea   [   ] abdominal pain   [   ] nausea   [   ] emesis                                :                      [   x] WNL           [   ] LAM  [   ] dysuria   [   ] difficulty voiding             Endo:                   [  x ] WNL          [   ] polyuria   [   ] temperature intolerance                 Skin:                     [  x ] WNL          [   ] pain   [   ] wound   [   ] rash   MSK:                    [  x ] WNL          [   ] muscle pain   [   ] joint pain/ stiffness   [   ] muscle tenderness   [   ] swelling   Neuro:                 [ x  ] WNL          [   ] HA   [   ] change in vision   [   ] tremor   [   ] weakness   [   ]dysphagia              Cognitive:           [   ] WNL           [ x  ]confusion at times - feels 'off'   Psych:                  [   ] WNL           [   ] hallucinations   [   ]agitation   [   ] delusion   [ x  ]depression/ anxiety    PHYSICAL EXAM    Vital Signs Last 24 Hrs  T(C): 36.6 (02-04-25 @ 05:50), Max: 36.6 (02-04-25 @ 05:50)  T(F): 97.9 (02-04-25 @ 05:50), Max: 97.9 (02-04-25 @ 05:50)  HR: 62 (02-04-25 @ 05:50) (62 - 85)  BP: 124/68 (02-04-25 @ 05:50) (114/66 - 124/68)  ABP: --  ABP(mean): --  RR: 19 (02-04-25 @ 05:50) (19 - 19)  SpO2: 98% (02-04-25 @ 05:50) (96% - 98%)        General:[x   ] NAD, Resting Comfortable,   [   ] other:                                HEENT: [ x  ] NC/AT, EOMI, PERRL , Normal Conjunctivae,   [   ] other:  Cardio: [  x ] RRR, no murmur   [   ] other:                              Pulm: [  x ] No Respiratory Distress,  Lungs CTAB,   [   ] other:                       Abdomen: [x   ]ND/NT, Soft,   [   ] other:    : [ x  ] NO LAM CATHETER, [   ] LAM CATHETER- no meatal tear, no discharge, [   ] other:                                            MSK: [x   ] No joint swelling, Full ROM,   [   ] other:                                         Ext: [  x ]No C/C/E, No calf tenderness,   [   ]other:    Skin: [ x  ]intact,   [   ] other:                                                                   Neurological Examination:  Cognitive: [    ] AAO x 3,   [ x   ]  other: AOx2 - not place                                                                     Attention:  [  x  ] intact,   [  ]  other:                         Memory: [    ] intact,    [ x   ]  other:   2/3 delayed recall with verbal cues  Mood/Affect: [  x  ] wnl,    [    ]  other:                                                                             Communication: [    ]Fluent, no dysarthria, following commands:  [   x ] other: mild dysarthria, expressive aphasia, mild anomia, hypophonia   CN II - XII:  [    ] intact,  [ x   ] other: R facial droop                                                                                        Motor:   RIGHT UE: [   ] WNL,  [x   ] other: 4+/5  LEFT    UE: [  x ] WNL,  [   ] other:  RIGHT LE: [   ] WNL,  [x   ] other: 4+/5  LEFT    LE: [x   ] WNL,  [   ] other:    Tone: [  x ] wnl,   [    ]  other:  DTRs: [   ]symmetric, [ x  ] other: R patellar reflex brisk 3+  Coordination:   [x    ] intact,   [    ] other:                                                                           Sensory: [   x ] Intact to light touch,   [    ] other:    MEDICATIONS  (STANDING):  amLODIPine   Tablet 5 milliGRAM(s) Oral daily  aspirin  chewable 81 milliGRAM(s) Oral daily  atorvastatin 80 milliGRAM(s) Oral at bedtime  carbidopa/levodopa  25/100 2 Tablet(s) Oral three times a day  carbidopa/levodopa CR 50/200 1 Tablet(s) Oral <User Schedule>  carvedilol 3.125 milliGRAM(s) Oral every 12 hours  chlorhexidine 2% Cloths 1 Application(s) Topical <User Schedule>  clopidogrel Tablet 75 milliGRAM(s) Oral daily  dextrose 5%. 1000 milliLiter(s) (100 mL/Hr) IV Continuous <Continuous>  dextrose 5%. 1000 milliLiter(s) (50 mL/Hr) IV Continuous <Continuous>  dextrose 50% Injectable 25 Gram(s) IV Push once  enoxaparin Injectable 40 milliGRAM(s) SubCutaneous every 24 hours  entacapone 200 milliGRAM(s) Oral three times a day  glucagon  Injectable 1 milliGRAM(s) IntraMuscular once  insulin lispro (ADMELOG) corrective regimen sliding scale   SubCutaneous three times a day before meals  levETIRAcetam 500 milliGRAM(s) Oral two times a day  mirtazapine 7.5 milliGRAM(s) Oral daily  polyethylene glycol 3350 17 Gram(s) Oral daily  rivastigmine patch  9.5 mG/24 Hr(s). 1 Patch Transdermal every 24 hours  senna 2 Tablet(s) Oral at bedtime  traZODone 50 milliGRAM(s) Oral at bedtime    MEDICATIONS  (PRN):  acetaminophen     Tablet .. 650 milliGRAM(s) Oral every 6 hours PRN Temp greater or equal to 38C (100.4F), Mild Pain (1 - 3)  aluminum hydroxide/magnesium hydroxide/simethicone Suspension 30 milliLiter(s) Oral every 4 hours PRN Dyspepsia  dextrose Oral Gel 15 Gram(s) Oral once PRN Blood Glucose LESS THAN 70 milliGRAM(s)/deciliter  magnesium hydroxide Suspension 30 milliLiter(s) Oral daily PRN Constipation  melatonin 3 milliGRAM(s) Oral at bedtime PRN Insomnia            RECENT LABS/IMAGING                          15.4   9.34  )-----------( 173      ( 03 Feb 2025 06:27 )             45.3     02-03    142  |  105  |  17  ----------------------------<  74  4.7   |  23  |  1.1    Ca    8.7      03 Feb 2025 06:27  Mg     2.1     02-03    TPro  5.6[L]  /  Alb  3.7  /  TBili  0.7  /  DBili  x   /  AST  23  /  ALT  11  /  AlkPhos  93  02-03    CAPILLARY BLOOD GLUCOSE    POCT Blood Glucose.: 247 mg/dL (04 Feb 2025 16:12)  POCT Blood Glucose.: 201 mg/dL (04 Feb 2025 11:09)  POCT Blood Glucose.: 97 mg/dL (04 Feb 2025 07:24)  POCT Blood Glucose.: 111 mg/dL (03 Feb 2025 21:00)    POCT Blood Glucose.: 145 mg/dL (02-03-25 @ 11:47)  POCT Blood Glucose.: 106 mg/dL (02-03-25 @ 07:30)  POCT Blood Glucose.: 156 mg/dL (02-02-25 @ 21:52)  POCT Blood Glucose.: 128 mg/dL (02-02-25 @ 16:03)  POCT Blood Glucose.: 169 mg/dL (02-02-25 @ 11:19)  POCT Blood Glucose.: 139 mg/dL (02-02-25 @ 07:29)  POCT Blood Glucose.: 155 mg/dL (02-01-25 @ 21:01)  POCT Blood Glucose.: 95 mg/dL (02-01-25 @ 16:21)  POCT Blood Glucose.: 285 mg/dL (02-01-25 @ 11:27)  POCT Blood Glucose.: 99 mg/dL (02-01-25 @ 07:33)  POCT Blood Glucose.: 123 mg/dL (01-31-25 @ 20:46)  POCT Blood Glucose.: 102 mg/dL (01-31-25 @ 16:23)

## 2025-02-05 LAB
GLUCOSE BLDC GLUCOMTR-MCNC: 115 MG/DL — HIGH (ref 70–99)
GLUCOSE BLDC GLUCOMTR-MCNC: 118 MG/DL — HIGH (ref 70–99)
GLUCOSE BLDC GLUCOMTR-MCNC: 125 MG/DL — HIGH (ref 70–99)
GLUCOSE BLDC GLUCOMTR-MCNC: 90 MG/DL — SIGNIFICANT CHANGE UP (ref 70–99)

## 2025-02-05 RX ADMIN — RIVASTIGMINE 1 PATCH: 4.6 PATCH, EXTENDED RELEASE TRANSDERMAL at 19:00

## 2025-02-05 RX ADMIN — ENTACAPONE 200 MILLIGRAM(S): 200 TABLET, FILM COATED ORAL at 06:24

## 2025-02-05 RX ADMIN — Medication 3.12 MILLIGRAM(S): at 06:23

## 2025-02-05 RX ADMIN — Medication 2 TABLET(S): at 06:24

## 2025-02-05 RX ADMIN — ATORVASTATIN CALCIUM 80 MILLIGRAM(S): 80 TABLET, FILM COATED ORAL at 21:45

## 2025-02-05 RX ADMIN — RIVASTIGMINE 1 PATCH: 4.6 PATCH, EXTENDED RELEASE TRANSDERMAL at 06:27

## 2025-02-05 RX ADMIN — Medication 2 TABLET(S): at 13:18

## 2025-02-05 RX ADMIN — Medication 3.12 MILLIGRAM(S): at 18:08

## 2025-02-05 RX ADMIN — LEVETIRACETAM 500 MILLIGRAM(S): 750 TABLET, FILM COATED ORAL at 18:08

## 2025-02-05 RX ADMIN — LEVETIRACETAM 500 MILLIGRAM(S): 750 TABLET, FILM COATED ORAL at 06:24

## 2025-02-05 RX ADMIN — Medication 50 MILLIGRAM(S): at 21:44

## 2025-02-05 RX ADMIN — ENOXAPARIN SODIUM 40 MILLIGRAM(S): 100 INJECTION SUBCUTANEOUS at 06:23

## 2025-02-05 RX ADMIN — Medication 75 MILLIGRAM(S): at 12:26

## 2025-02-05 RX ADMIN — MIRTAZAPINE 7.5 MILLIGRAM(S): 30 TABLET, FILM COATED ORAL at 12:26

## 2025-02-05 RX ADMIN — Medication 2 TABLET(S): at 21:44

## 2025-02-05 RX ADMIN — ASPIRIN 81 MILLIGRAM(S): 81 TABLET, COATED ORAL at 12:26

## 2025-02-05 RX ADMIN — Medication 1 TABLET(S): at 18:08

## 2025-02-05 RX ADMIN — Medication 5 MILLIGRAM(S): at 06:23

## 2025-02-05 RX ADMIN — ENTACAPONE 200 MILLIGRAM(S): 200 TABLET, FILM COATED ORAL at 13:18

## 2025-02-05 RX ADMIN — ANTISEPTIC SURGICAL SCRUB 1 APPLICATION(S): 0.04 SOLUTION TOPICAL at 07:03

## 2025-02-05 RX ADMIN — ENTACAPONE 200 MILLIGRAM(S): 200 TABLET, FILM COATED ORAL at 21:45

## 2025-02-05 RX ADMIN — POLYETHYLENE GLYCOL 3350 17 GRAM(S): 17 POWDER, FOR SOLUTION ORAL at 12:26

## 2025-02-05 NOTE — PROGRESS NOTE ADULT - ASSESSMENT
74 M PMH of Parkinson's, HTN, hx ischemic CVA x2, type 2 DM, dementia and chronic back pain, Hx of seizures on keppra BIBA from [place] facility  or generalized weakness, confusion, decreased PO intake and multiple witnessed falls over the past few days. He had two witnessed falls the day prior to admission where he was weak and slumped to the ground. No LOC, not on AC. Per staff, he is normally alert and oriented x 3 and able to perform ADLs, but for the past few days has been increasingly confused. He was evaluated in the ED the day prior for similar complaints, had a negative work up and was discharged. Infectious and trauma workup negative. On 1/21 patient was notes to have expressive aphasia and R facial droop. Evaluated by neurology, MRI ordered and showed acute stroke in the L corona radiata. CTA Head/Neck showed severe stenosis left MCA proximal M2. Moderate narrowing right proximal vertebral artery On 1/22 new onset L tongue deviation and mild dysarthria - repeat MRI brain with no new acute findings. ILR placed 1/24. NIHSS 4 on admission.    #Rehab for gait abnormality, dysarthria and expressive aphasia iso acute L corona radiata stroke  -Imaging as above  -s/p ILR 1/24  -s/p  EEG 1/22: generalized slowing, no seizures  - A1C 4.9, , TSH 2.81  -c/w ASA and Plavix for 90 days  -c/w Lipitor 80 mg qhs  -PT/OT/SLP/Neuropsych eval and treat   - mobility and ADLs and cognition improving    #HTN  -c/w amlodipine 5 mg daily  -c/w Coreg 3.125 mg q12h  -Monitor BP, stable in good range     #HLD  -c/w Lipitor 80 mg qhs    #DM2  - A1C with Estimated Average Glucose Result: 4.9: Method: Immunoassay - well controlled on no meds at this time.  - on low dose gabapentin at night. No c/o of pain or dysesthesias. d/c and monitor.    #Hx of seizures  -c/w Keppra 500 bid    #Hx of Parkinson’s Syndrome  #Hx of dementia. Mild and does not interfere with patient's ability to participate in and benefit from acute rehab.  -c/w home meds sinemet, entacapone, and rivastigmine     #Anxiety/depression  #Insomnia  -c/w mirtazapine 7.5 daily  -was on Trazadone 100mg qhs - given increased confusion, decreased to 50 mg hs   -c/w melatonin 3 mg qhs prn  - sleeping well and more alert during the day     -Pain control: tylenol prn    -GI/Bowel Mgmt: senna/Miralax    - Diet: Easy to chew with thins DASH/carb consistent           Precautions / PROPHYLAXIS:      - Falls      - DVT prophylaxis: Lovenox

## 2025-02-05 NOTE — PROGRESS NOTE ADULT - ASSESSMENT
Neuropsychology Follow Up      Pt was seen at bedside for mood support and adjustment to medical status. Pt mood remains depressed but motivated to continue rehab. Pt was provided with therapy to address his concerns and validate feelings. Also provided with PD resources and overall stroke recovery. Patient benefits from continuation of psychotherapy. Will refer outpatient.

## 2025-02-05 NOTE — PROGRESS NOTE ADULT - ATTENDING COMMENTS
Patient seen and examined with the resident. We discussed the case. I have directed the care. I edited the note. The patient requires acute rehab with 3 hours of daily therapies at least 5 out of 7 days and close physiatry follow up.  #Rehab for gait abnormality, dysarthria and expressive aphasia iso acute L corona radiata stroke  -Imaging as above  -s/p ILR 1/24  -s/p  EEG 1/22: generalized slowing, no seizures  - A1C 4.9, , TSH 2.81  -c/w ASA and Plavix for 90 days  -c/w Lipitor 80 mg qhs  -PT/OT/SLP/Neuropsych eval and treat   - mobility and ADLs and cognition improving    #HTN  -c/w amlodipine 5 mg daily  -c/w Coreg 3.125 mg q12h  -Monitor BP, stable in good range     #HLD  -c/w Lipitor 80 mg qhs    #DM2  - A1C with Estimated Average Glucose Result: 4.9: Method: Immunoassay - well controlled on no meds at this time.  - on low dose gabapentin at night. No c/o of pain or dysesthesias. d/c and monitor.    #Hx of seizures  -c/w Keppra 500 bid    #Hx of Parkinson’s Syndrome  #Hx of dementia. Mild and does not interfere with patient's ability to participate in and benefit from acute rehab.  -c/w home meds sinemet, entacapone, and rivastigmine     #Anxiety/depression  #Insomnia  -c/w mirtazapine 7.5 daily  -was on Trazadone 100mg qhs - given increased confusion, decreased to 50 mg hs   -c/w melatonin 3 mg qhs prn  - sleeping well and more alert during the day     -Pain control: tylenol prn    -GI/Bowel Mgmt: senna/Miralax    - Diet: Easy to chew with thins DASH/carb consistent

## 2025-02-05 NOTE — PROGRESS NOTE ADULT - SUBJECTIVE AND OBJECTIVE BOX
Patient is a 74 y old male who presents with a chief complaint of acute stroke and Parkinson's Syndrome with decline in function (24 Jan 2025 13:28)      HPI:    74 M PMH of Parkinson's, HTN, hx ischemic CVA x2, type 2 DM, dementia and chronic back pain, Hx of seizures on keppra BIBA from [facility] facility  or generalized weakness, confusion, decreased PO intake and multiple witnessed falls over the past few days. He had two witnessed falls the day prior to admission where he was weak and slumped to the ground. No LOC, not on AC. Per staff, he is normally alert and oriented x 3 and able to perform ADLs, but for the past few days has been increasingly confused. He was evaluated in the ED the day prior for similar complaints, had a negative work up and was discharged. Infectious and trauma workup negative. On 1/21 patient was notes to have expressive aphasia and R facial droop. Evaluated by neurology, MRI ordered and showed acute stroke in the L corona radiata. CTA Head/Neck showed severe stenosis left MCA proximal M2. Moderate narrowing right proximal vertebral artery On 1/22 new onset L tongue deviation and mild dysarthria - repeat MRI brain with no new acute findings. ILR placed 1/24.    Prior to admission, the patient was independent in ADLs and ambulation without an assistive device Patient now requires Reza for bed mobility, modA for transfers, ambulating 40’x2 with RW and modA,  modA for LBD. Therefore, there is a significant functional decline from baseline. Patients also with medical comorbidities of DM, HTN, requiring medication management and monitoring of vitals by Physiatry team and nursing. To return home it is in the patient’s best interest to have acute inpatient rehabilitative services to undergo intensive interdisciplinary therapy. Furthermore, the patient is motivated and is able to tolerate up to 3 hours of daily therapy for 5-6 days a week for a total of 15 hours a week. Evaluated by Physiatry and deemed to be an excellent candidate for admission to  for acute inpatient rehab. Admitted to Acute Rehab on 1/24/25    LS: Lives alone in a private home, 6STE  PLOF: independent in ADLs and ambulation with no AS     (24 Jan 2025 13:28)    TODAY'S SUBJECTIVE & REVIEW OF SYMPTOMS:  Patient was seen and assessed at bedside.   No overnight events.   Patient denies any new complaints at this time. continues to be on 1:1  Tolerating PT/OT.   Tolerating oral diet.   Voiding spontaneously.   Regular BM  Vital signs reviewed.      CLOF: Reza for bed mobility, min A for transfers, ambulating 75 ft. with RW and min A, dressing with min assist       Constitutional:    [   ] WNL           [   ] poor appetite   [   ] insomnia   [   ] tired   [ x] weak   Cardio:                [ x  ] WNL           [   ] CP   [   ] SY   [   ] palpitations               Resp:                   [ x  ] WNL           [   ] SOB   [   ] cough   [   ] wheezing   GI:                        [  x ] WNL           [   ] constipation   [   ] diarrhea   [   ] abdominal pain   [   ] nausea   [   ] emesis                                :                      [   x] WNL           [   ] LAM  [   ] dysuria   [   ] difficulty voiding             Endo:                   [  x ] WNL          [   ] polyuria   [   ] temperature intolerance                 Skin:                     [  x ] WNL          [   ] pain   [   ] wound   [   ] rash   MSK:                    [  x ] WNL          [   ] muscle pain   [   ] joint pain/ stiffness   [   ] muscle tenderness   [   ] swelling   Neuro:                 [ x  ] WNL          [   ] HA   [   ] change in vision   [   ] tremor   [   ] weakness   [   ]dysphagia              Cognitive:           [   ] WNL           [ x  ]confusion at times - feels 'off'   Psych:                  [   ] WNL           [   ] hallucinations   [   ]agitation   [   ] delusion   [ x  ]depression/ anxiety    PHYSICAL EXAM    Vital Signs Last 24 Hrs  T(C): 36.3 (02-05-25 @ 05:41), Max: 36.6 (02-04-25 @ 13:22)  T(F): 97.3 (02-05-25 @ 05:41), Max: 97.9 (02-04-25 @ 13:22)  HR: 68 (02-05-25 @ 05:41) (68 - 79)  BP: 129/78 (02-05-25 @ 05:41) (115/72 - 129/78)  ABP: --  ABP(mean): --  RR: 18 (02-05-25 @ 05:41) (18 - 19)  SpO2: 98% (02-05-25 @ 05:41) (98% - 100%)          General:[x   ] NAD, Resting Comfortable,   [   ] other:                                HEENT: [ x  ] NC/AT, EOMI, PERRL , Normal Conjunctivae,   [   ] other:  Cardio: [  x ] RRR, no murmur   [   ] other:                              Pulm: [  x ] No Respiratory Distress,  Lungs CTAB,   [   ] other:                       Abdomen: [x   ]ND/NT, Soft,   [   ] other:    : [ x  ] NO LAM CATHETER, [   ] LAM CATHETER- no meatal tear, no discharge, [   ] other:                                            MSK: [x   ] No joint swelling, Full ROM,   [   ] other:                                         Ext: [  x ]No C/C/E, No calf tenderness,   [   ]other:    Skin: [ x  ]intact,   [   ] other:                                                                   Neurological Examination:  Cognitive: [    ] AAO x 3,   [ x   ]  other: AOx2 - not place                                                                     Attention:  [  x  ] intact,   [  ]  other:                         Memory: [    ] intact,    [ x   ]  other:   2/3 delayed recall with verbal cues  Mood/Affect: [  x  ] wnl,    [    ]  other:                                                                             Communication: [    ]Fluent, no dysarthria, following commands:  [   x ] other: mild dysarthria, expressive aphasia, mild anomia, hypophonia   CN II - XII:  [    ] intact,  [ x   ] other: R facial droop                                                                                        Motor:   RIGHT UE: [   ] WNL,  [x   ] other: 4+/5  LEFT    UE: [  x ] WNL,  [   ] other:  RIGHT LE: [   ] WNL,  [x   ] other: 4+/5  LEFT    LE: [x   ] WNL,  [   ] other:    Tone: [  x ] wnl,   [    ]  other:  DTRs: [   ]symmetric, [ x  ] other: R patellar reflex brisk 3+  Coordination:   [x    ] intact,   [    ] other:                                                                           Sensory: [   x ] Intact to light touch,   [    ] other:    MEDICATIONS  (STANDING):  amLODIPine   Tablet 5 milliGRAM(s) Oral daily  aspirin  chewable 81 milliGRAM(s) Oral daily  atorvastatin 80 milliGRAM(s) Oral at bedtime  carbidopa/levodopa  25/100 2 Tablet(s) Oral three times a day  carbidopa/levodopa CR 50/200 1 Tablet(s) Oral <User Schedule>  carvedilol 3.125 milliGRAM(s) Oral every 12 hours  chlorhexidine 2% Cloths 1 Application(s) Topical <User Schedule>  clopidogrel Tablet 75 milliGRAM(s) Oral daily  dextrose 5%. 1000 milliLiter(s) (100 mL/Hr) IV Continuous <Continuous>  dextrose 5%. 1000 milliLiter(s) (50 mL/Hr) IV Continuous <Continuous>  dextrose 50% Injectable 25 Gram(s) IV Push once  enoxaparin Injectable 40 milliGRAM(s) SubCutaneous every 24 hours  entacapone 200 milliGRAM(s) Oral three times a day  glucagon  Injectable 1 milliGRAM(s) IntraMuscular once  insulin lispro (ADMELOG) corrective regimen sliding scale   SubCutaneous three times a day before meals  levETIRAcetam 500 milliGRAM(s) Oral two times a day  mirtazapine 7.5 milliGRAM(s) Oral daily  polyethylene glycol 3350 17 Gram(s) Oral daily  rivastigmine patch  9.5 mG/24 Hr(s). 1 Patch Transdermal every 24 hours  senna 2 Tablet(s) Oral at bedtime  traZODone 50 milliGRAM(s) Oral at bedtime    MEDICATIONS  (PRN):  acetaminophen     Tablet .. 650 milliGRAM(s) Oral every 6 hours PRN Temp greater or equal to 38C (100.4F), Mild Pain (1 - 3)  aluminum hydroxide/magnesium hydroxide/simethicone Suspension 30 milliLiter(s) Oral every 4 hours PRN Dyspepsia  dextrose Oral Gel 15 Gram(s) Oral once PRN Blood Glucose LESS THAN 70 milliGRAM(s)/deciliter  magnesium hydroxide Suspension 30 milliLiter(s) Oral daily PRN Constipation  melatonin 3 milliGRAM(s) Oral at bedtime PRN Insomnia            RECENT LABS/IMAGING                          15.4   9.34  )-----------( 173      ( 03 Feb 2025 06:27 )             45.3     02-03    142  |  105  |  17  ----------------------------<  74  4.7   |  23  |  1.1    Ca    8.7      03 Feb 2025 06:27  Mg     2.1     02-03    TPro  5.6[L]  /  Alb  3.7  /  TBili  0.7  /  DBili  x   /  AST  23  /  ALT  11  /  AlkPhos  93  02-03    CAPILLARY BLOOD GLUCOSE    POCT Blood Glucose.: 247 mg/dL (04 Feb 2025 16:12)  POCT Blood Glucose.: 201 mg/dL (04 Feb 2025 11:09)  POCT Blood Glucose.: 97 mg/dL (04 Feb 2025 07:24)  POCT Blood Glucose.: 111 mg/dL (03 Feb 2025 21:00)    POCT Blood Glucose.: 145 mg/dL (02-03-25 @ 11:47)  POCT Blood Glucose.: 106 mg/dL (02-03-25 @ 07:30)  POCT Blood Glucose.: 156 mg/dL (02-02-25 @ 21:52)  POCT Blood Glucose.: 128 mg/dL (02-02-25 @ 16:03)  POCT Blood Glucose.: 169 mg/dL (02-02-25 @ 11:19)  POCT Blood Glucose.: 139 mg/dL (02-02-25 @ 07:29)  POCT Blood Glucose.: 155 mg/dL (02-01-25 @ 21:01)  POCT Blood Glucose.: 95 mg/dL (02-01-25 @ 16:21)  POCT Blood Glucose.: 285 mg/dL (02-01-25 @ 11:27)  POCT Blood Glucose.: 99 mg/dL (02-01-25 @ 07:33)  POCT Blood Glucose.: 123 mg/dL (01-31-25 @ 20:46)  POCT Blood Glucose.: 102 mg/dL (01-31-25 @ 16:23)

## 2025-02-06 LAB
GLUCOSE BLDC GLUCOMTR-MCNC: 113 MG/DL — HIGH (ref 70–99)
GLUCOSE BLDC GLUCOMTR-MCNC: 132 MG/DL — HIGH (ref 70–99)
GLUCOSE BLDC GLUCOMTR-MCNC: 192 MG/DL — HIGH (ref 70–99)
GLUCOSE BLDC GLUCOMTR-MCNC: 86 MG/DL — SIGNIFICANT CHANGE UP (ref 70–99)

## 2025-02-06 RX ORDER — DEXTROMETHORPHAN HBR AND GUAIFENESIN ORAL SOLUTION 10; 100 MG/5ML; MG/5ML
10 LIQUID ORAL EVERY 6 HOURS
Refills: 0 | Status: DISCONTINUED | OUTPATIENT
Start: 2025-02-06 | End: 2025-02-12

## 2025-02-06 RX ADMIN — DEXTROMETHORPHAN HBR AND GUAIFENESIN ORAL SOLUTION 10 MILLILITER(S): 10; 100 LIQUID ORAL at 23:41

## 2025-02-06 RX ADMIN — Medication 3.12 MILLIGRAM(S): at 17:13

## 2025-02-06 RX ADMIN — Medication 1 TABLET(S): at 17:13

## 2025-02-06 RX ADMIN — Medication 2 TABLET(S): at 06:07

## 2025-02-06 RX ADMIN — RIVASTIGMINE 1 PATCH: 4.6 PATCH, EXTENDED RELEASE TRANSDERMAL at 06:16

## 2025-02-06 RX ADMIN — Medication 5 MILLIGRAM(S): at 06:07

## 2025-02-06 RX ADMIN — RIVASTIGMINE 1 PATCH: 4.6 PATCH, EXTENDED RELEASE TRANSDERMAL at 06:05

## 2025-02-06 RX ADMIN — ANTISEPTIC SURGICAL SCRUB 1 APPLICATION(S): 0.04 SOLUTION TOPICAL at 06:13

## 2025-02-06 RX ADMIN — Medication 50 MILLIGRAM(S): at 21:43

## 2025-02-06 RX ADMIN — ATORVASTATIN CALCIUM 80 MILLIGRAM(S): 80 TABLET, FILM COATED ORAL at 21:43

## 2025-02-06 RX ADMIN — Medication 3.12 MILLIGRAM(S): at 06:07

## 2025-02-06 RX ADMIN — ENOXAPARIN SODIUM 40 MILLIGRAM(S): 100 INJECTION SUBCUTANEOUS at 06:12

## 2025-02-06 RX ADMIN — Medication 75 MILLIGRAM(S): at 12:23

## 2025-02-06 RX ADMIN — LEVETIRACETAM 500 MILLIGRAM(S): 750 TABLET, FILM COATED ORAL at 06:06

## 2025-02-06 RX ADMIN — ENTACAPONE 200 MILLIGRAM(S): 200 TABLET, FILM COATED ORAL at 06:06

## 2025-02-06 RX ADMIN — ASPIRIN 81 MILLIGRAM(S): 81 TABLET, COATED ORAL at 12:23

## 2025-02-06 RX ADMIN — Medication 2 TABLET(S): at 13:16

## 2025-02-06 RX ADMIN — ENTACAPONE 200 MILLIGRAM(S): 200 TABLET, FILM COATED ORAL at 13:16

## 2025-02-06 RX ADMIN — RIVASTIGMINE 1 PATCH: 4.6 PATCH, EXTENDED RELEASE TRANSDERMAL at 19:00

## 2025-02-06 RX ADMIN — Medication 2 TABLET(S): at 21:43

## 2025-02-06 RX ADMIN — LEVETIRACETAM 500 MILLIGRAM(S): 750 TABLET, FILM COATED ORAL at 17:13

## 2025-02-06 RX ADMIN — ENTACAPONE 200 MILLIGRAM(S): 200 TABLET, FILM COATED ORAL at 21:43

## 2025-02-06 RX ADMIN — DEXTROMETHORPHAN HBR AND GUAIFENESIN ORAL SOLUTION 10 MILLILITER(S): 10; 100 LIQUID ORAL at 12:24

## 2025-02-06 RX ADMIN — MIRTAZAPINE 7.5 MILLIGRAM(S): 30 TABLET, FILM COATED ORAL at 12:23

## 2025-02-06 NOTE — PROGRESS NOTE ADULT - SUBJECTIVE AND OBJECTIVE BOX
Patient is a 74 y old male who presents with a chief complaint of acute stroke and Parkinson's Syndrome with decline in function (24 Jan 2025 13:28)      HPI:    74 M PMH of Parkinson's, HTN, hx ischemic CVA x2, type 2 DM, dementia and chronic back pain, Hx of seizures on keppra BIBA from [facility] facility  or generalized weakness, confusion, decreased PO intake and multiple witnessed falls over the past few days. He had two witnessed falls the day prior to admission where he was weak and slumped to the ground. No LOC, not on AC. Per staff, he is normally alert and oriented x 3 and able to perform ADLs, but for the past few days has been increasingly confused. He was evaluated in the ED the day prior for similar complaints, had a negative work up and was discharged. Infectious and trauma workup negative. On 1/21 patient was notes to have expressive aphasia and R facial droop. Evaluated by neurology, MRI ordered and showed acute stroke in the L corona radiata. CTA Head/Neck showed severe stenosis left MCA proximal M2. Moderate narrowing right proximal vertebral artery On 1/22 new onset L tongue deviation and mild dysarthria - repeat MRI brain with no new acute findings. ILR placed 1/24.    Prior to admission, the patient was independent in ADLs and ambulation without an assistive device Patient now requires Reza for bed mobility, modA for transfers, ambulating 40’x2 with RW and modA,  modA for LBD. Therefore, there is a significant functional decline from baseline. Patients also with medical comorbidities of DM, HTN, requiring medication management and monitoring of vitals by Physiatry team and nursing. To return home it is in the patient’s best interest to have acute inpatient rehabilitative services to undergo intensive interdisciplinary therapy. Furthermore, the patient is motivated and is able to tolerate up to 3 hours of daily therapy for 5-6 days a week for a total of 15 hours a week. Evaluated by Physiatry and deemed to be an excellent candidate for admission to  for acute inpatient rehab. Admitted to Acute Rehab on 1/24/25    LS: Lives alone in a private home, 6STE  PLOF: independent in ADLs and ambulation with no AS     (24 Jan 2025 13:28)    TODAY'S SUBJECTIVE & REVIEW OF SYMPTOMS:  Patient was seen and examined at bedside this morning. No acute overnight events. C/o sore throat. Denies fever, chills, nausea, vomiting, coughing, CP and SOB. Started Robitussin-DM for patient. Otherwise, patient in good spirits and participating/tolerating in therapies well. Voiding spontaneously and has a regular BM. Vitals reviewed.    Currently on 1:1 constant observation.     CLOF: Reza for bed mobility, min A for transfers, ambulating 75 ft. with RW and min A, dressing with min assist       Constitutional:    [   ] WNL           [   ] poor appetite   [   ] insomnia   [   ] tired   [ x] weak   Cardio:                [ x  ] WNL           [   ] CP   [   ] SY   [   ] palpitations               Resp:                   [ x  ] WNL           [   ] SOB   [   ] cough   [   ] wheezing   GI:                        [  x ] WNL           [   ] constipation   [   ] diarrhea   [   ] abdominal pain   [   ] nausea   [   ] emesis                                :                      [   x] WNL           [   ] LAM  [   ] dysuria   [   ] difficulty voiding             Endo:                   [  x ] WNL          [   ] polyuria   [   ] temperature intolerance                 Skin:                     [  x ] WNL          [   ] pain   [   ] wound   [   ] rash   MSK:                    [  x ] WNL          [   ] muscle pain   [   ] joint pain/ stiffness   [   ] muscle tenderness   [   ] swelling   Neuro:                 [ x  ] WNL          [   ] HA   [   ] change in vision   [   ] tremor   [   ] weakness   [   ]dysphagia              Cognitive:           [   ] WNL           [ x  ]confusion at times - feels 'off'   Psych:                  [   ] WNL           [   ] hallucinations   [   ]agitation   [   ] delusion   [ x  ]depression/ anxiety    PHYSICAL EXAM  Vital Signs Last 24 Hrs  T(C): 36.7 (02-06-25 @ 04:44), Max: 36.7 (02-06-25 @ 04:44)  T(F): 98 (02-06-25 @ 04:44), Max: 98 (02-06-25 @ 04:44)  HR: 61 (02-06-25 @ 04:44) (61 - 76)  BP: 147/87 (02-06-25 @ 04:44) (132/79 - 147/87)  BP(mean): 107 (02-06-25 @ 04:44) (98 - 107)  RR: 18 (02-06-25 @ 04:44) (18 - 18)  SpO2: 96% (02-06-25 @ 04:44) (96% - 98%)      General:[x   ] NAD, Resting Comfortable,   [   ] other:                                HEENT: [ x  ] NC/AT, EOMI, PERRL , Normal Conjunctivae,   [   ] other:  Cardio: [  x ] RRR, no murmur   [   ] other:                              Pulm: [  x ] No Respiratory Distress,  Lungs CTAB,   [   ] other:                       Abdomen: [x   ]ND/NT, Soft,   [   ] other:    : [ x  ] NO LAM CATHETER, [   ] LAM CATHETER- no meatal tear, no discharge, [   ] other:                                            MSK: [x   ] No joint swelling, Full ROM,   [   ] other:                                         Ext: [  x ]No C/C/E, No calf tenderness,   [   ]other:    Skin: [ x  ]intact,   [   ] other:                                                                   Neurological Examination:  Cognitive: [    ] AAO x 3,   [ x   ]  other: AOx2 - not place                                                                     Attention:  [  x  ] intact,   [  ]  other:                         Memory: [    ] intact,    [ x   ]  other:   2/3 delayed recall with verbal cues  Mood/Affect: [  x  ] wnl,    [    ]  other:                                                                             Communication: [    ]Fluent, no dysarthria, following commands:  [   x ] other: mild dysarthria, expressive aphasia, mild anomia, hypophonia   CN II - XII:  [    ] intact,  [ x   ] other: R facial droop                                                                                        Motor:   RIGHT UE: [   ] WNL,  [x   ] other: 4+/5  LEFT    UE: [  x ] WNL,  [   ] other:  RIGHT LE: [   ] WNL,  [x   ] other: 4+/5  LEFT    LE: [x   ] WNL,  [   ] other:    Tone: [  x ] wnl,   [    ]  other:  DTRs: [   ]symmetric, [ x  ] other: R patellar reflex brisk 3+  Coordination:   [x    ] intact,   [    ] other:                                                                           Sensory: [   x ] Intact to light touch,   [    ] other:    MEDICATIONS  (STANDING):  amLODIPine   Tablet 5 milliGRAM(s) Oral daily  aspirin  chewable 81 milliGRAM(s) Oral daily  atorvastatin 80 milliGRAM(s) Oral at bedtime  carbidopa/levodopa  25/100 2 Tablet(s) Oral three times a day  carbidopa/levodopa CR 50/200 1 Tablet(s) Oral <User Schedule>  carvedilol 3.125 milliGRAM(s) Oral every 12 hours  chlorhexidine 2% Cloths 1 Application(s) Topical <User Schedule>  clopidogrel Tablet 75 milliGRAM(s) Oral daily  dextrose 5%. 1000 milliLiter(s) (50 mL/Hr) IV Continuous <Continuous>  dextrose 5%. 1000 milliLiter(s) (100 mL/Hr) IV Continuous <Continuous>  dextrose 50% Injectable 25 Gram(s) IV Push once  enoxaparin Injectable 40 milliGRAM(s) SubCutaneous every 24 hours  entacapone 200 milliGRAM(s) Oral three times a day  glucagon  Injectable 1 milliGRAM(s) IntraMuscular once  guaifenesin/dextromethorphan Oral Liquid 10 milliLiter(s) Oral every 6 hours  insulin lispro (ADMELOG) corrective regimen sliding scale   SubCutaneous three times a day before meals  levETIRAcetam 500 milliGRAM(s) Oral two times a day  mirtazapine 7.5 milliGRAM(s) Oral daily  polyethylene glycol 3350 17 Gram(s) Oral daily  rivastigmine patch  9.5 mG/24 Hr(s). 1 Patch Transdermal every 24 hours  senna 2 Tablet(s) Oral at bedtime  traZODone 50 milliGRAM(s) Oral at bedtime    MEDICATIONS  (PRN):  acetaminophen     Tablet .. 650 milliGRAM(s) Oral every 6 hours PRN Temp greater or equal to 38C (100.4F), Mild Pain (1 - 3)  aluminum hydroxide/magnesium hydroxide/simethicone Suspension 30 milliLiter(s) Oral every 4 hours PRN Dyspepsia  dextrose Oral Gel 15 Gram(s) Oral once PRN Blood Glucose LESS THAN 70 milliGRAM(s)/deciliter  magnesium hydroxide Suspension 30 milliLiter(s) Oral daily PRN Constipation  melatonin 3 milliGRAM(s) Oral at bedtime PRN Insomnia      RECENT LABS/IMAGING                          15.4   9.34  )-----------( 173      ( 03 Feb 2025 06:27 )             45.3     02-03    142  |  105  |  17  ----------------------------<  74  4.7   |  23  |  1.1    Ca    8.7      03 Feb 2025 06:27  Mg     2.1     02-03    TPro  5.6[L]  /  Alb  3.7  /  TBili  0.7  /  DBili  x   /  AST  23  /  ALT  11  /  AlkPhos  93  02-03    CAPILLARY BLOOD GLUCOSE    POCT Blood Glucose.: 247 mg/dL (04 Feb 2025 16:12)  POCT Blood Glucose.: 201 mg/dL (04 Feb 2025 11:09)  POCT Blood Glucose.: 97 mg/dL (04 Feb 2025 07:24)  POCT Blood Glucose.: 111 mg/dL (03 Feb 2025 21:00)    POCT Blood Glucose.: 145 mg/dL (02-03-25 @ 11:47)  POCT Blood Glucose.: 106 mg/dL (02-03-25 @ 07:30)  POCT Blood Glucose.: 156 mg/dL (02-02-25 @ 21:52)  POCT Blood Glucose.: 128 mg/dL (02-02-25 @ 16:03)  POCT Blood Glucose.: 169 mg/dL (02-02-25 @ 11:19)  POCT Blood Glucose.: 139 mg/dL (02-02-25 @ 07:29)  POCT Blood Glucose.: 155 mg/dL (02-01-25 @ 21:01)  POCT Blood Glucose.: 95 mg/dL (02-01-25 @ 16:21)  POCT Blood Glucose.: 285 mg/dL (02-01-25 @ 11:27)  POCT Blood Glucose.: 99 mg/dL (02-01-25 @ 07:33)  POCT Blood Glucose.: 123 mg/dL (01-31-25 @ 20:46)  POCT Blood Glucose.: 102 mg/dL (01-31-25 @ 16:23)

## 2025-02-06 NOTE — PROGRESS NOTE ADULT - ASSESSMENT
74 M PMH of Parkinson's, HTN, hx ischemic CVA x2, type 2 DM, dementia and chronic back pain, Hx of seizures on keppra BIBA from [place] facility  or generalized weakness, confusion, decreased PO intake and multiple witnessed falls over the past few days. He had two witnessed falls the day prior to admission where he was weak and slumped to the ground. No LOC, not on AC. Per staff, he is normally alert and oriented x 3 and able to perform ADLs, but for the past few days has been increasingly confused. He was evaluated in the ED the day prior for similar complaints, had a negative work up and was discharged. Infectious and trauma workup negative. On 1/21 patient was notes to have expressive aphasia and R facial droop. Evaluated by neurology, MRI ordered and showed acute stroke in the L corona radiata. CTA Head/Neck showed severe stenosis left MCA proximal M2. Moderate narrowing right proximal vertebral artery On 1/22 new onset L tongue deviation and mild dysarthria - repeat MRI brain with no new acute findings. ILR placed 1/24. NIHSS 4 on admission.    #Rehab for gait abnormality, dysarthria and expressive aphasia iso acute L corona radiata stroke  -Imaging as above  -s/p ILR 1/24  -s/p  EEG 1/22: generalized slowing, no seizures  - A1C 4.9, , TSH 2.81  -c/w ASA and Plavix for 90 days  -c/w Lipitor 80 mg qhs  -PT/OT/SLP/Neuropsych eval and treat   - mobility and ADLs and cognition improving    #HTN  -c/w amlodipine 5 mg daily  -c/w Coreg 3.125 mg q12h  -Monitor BP, stable in good range     #HLD  -c/w Lipitor 80 mg qhs    #DM2  - A1C with Estimated Average Glucose Result: 4.9: Method: Immunoassay - well controlled on no meds at this time.  - on low dose gabapentin at night. No c/o of pain or dysesthesias. d/c and monitor.    #Hx of seizures  -c/w Keppra 500 bid    #Hx of Parkinson’s Syndrome  #Hx of dementia. Mild and does not interfere with patient's ability to participate in and benefit from acute rehab.  -c/w home meds sinemet, entacapone, and rivastigmine     #Anxiety/depression  #Insomnia  -c/w mirtazapine 7.5 daily  -was on Trazadone 100mg qhs - given increased confusion, decreased to 50 mg hs   -c/w melatonin 3 mg qhs prn  - sleeping well and more alert during the day    #Sore Throat  -Afebrile, no leukocytosis  -Started Robitussin-DM  -Will continue to monitor  -Pain control: Tylenol prn    -GI/Bowel Mgmt: senna/Miralax    - Diet: Easy to chew with thins DASH/carb consistent           Precautions / PROPHYLAXIS:      - Falls      - DVT prophylaxis: Lovenox

## 2025-02-06 NOTE — PROGRESS NOTE ADULT - ATTENDING COMMENTS
Patient seen and examined with the resident. We discussed the case. I have directed the care. I edited the note. The patient requires acute rehab with 3 hours of daily therapies at least 5 out of 7 days and close physiatry follow up.  #Rehab for gait abnormality, dysarthria and expressive aphasia iso acute L corona radiata stroke  -Imaging as above  -s/p ILR 1/24  -s/p  EEG 1/22: generalized slowing, no seizures  - A1C 4.9, , TSH 2.81  -c/w ASA and Plavix for 90 days  -c/w Lipitor 80 mg qhs  -PT/OT/SLP/Neuropsych eval and treat   - mobility and ADLs and cognition improving    #HTN  -c/w amlodipine 5 mg daily  -c/w Coreg 3.125 mg q12h  -Monitor BP, stable in good range     #HLD  -c/w Lipitor 80 mg qhs    #DM2  - A1C with Estimated Average Glucose Result: 4.9: Method: Immunoassay - well controlled on no meds at this time.  - on low dose gabapentin at night. No c/o of pain or dysesthesias. d/c and monitor.    #Hx of seizures  -c/w Keppra 500 bid    #Hx of Parkinson’s Syndrome  #Hx of dementia. Mild and does not interfere with patient's ability to participate in and benefit from acute rehab.  -c/w home meds sinemet, entacapone, and rivastigmine     #Anxiety/depression  #Insomnia  -c/w mirtazapine 7.5 daily  -was on Trazadone 100mg qhs - given increased confusion, decreased to 50 mg hs   -c/w melatonin 3 mg qhs prn  - sleeping well and more alert during the day    #Sore Throat  -Afebrile, no leukocytosis  -Started Robitussin-DM  -Will continue to monitor  -Pain control: Tylenol prn    -GI/Bowel Mgmt: senna/Miralax    - Diet: Easy to chew with thins DASH/carb consistent

## 2025-02-07 LAB
GLUCOSE BLDC GLUCOMTR-MCNC: 101 MG/DL — HIGH (ref 70–99)
GLUCOSE BLDC GLUCOMTR-MCNC: 103 MG/DL — HIGH (ref 70–99)
GLUCOSE BLDC GLUCOMTR-MCNC: 134 MG/DL — HIGH (ref 70–99)
GLUCOSE BLDC GLUCOMTR-MCNC: 139 MG/DL — HIGH (ref 70–99)

## 2025-02-07 RX ADMIN — ENTACAPONE 200 MILLIGRAM(S): 200 TABLET, FILM COATED ORAL at 06:09

## 2025-02-07 RX ADMIN — Medication 2 TABLET(S): at 13:03

## 2025-02-07 RX ADMIN — RIVASTIGMINE 1 PATCH: 4.6 PATCH, EXTENDED RELEASE TRANSDERMAL at 20:49

## 2025-02-07 RX ADMIN — POLYETHYLENE GLYCOL 3350 17 GRAM(S): 17 POWDER, FOR SOLUTION ORAL at 11:56

## 2025-02-07 RX ADMIN — ENTACAPONE 200 MILLIGRAM(S): 200 TABLET, FILM COATED ORAL at 13:02

## 2025-02-07 RX ADMIN — Medication 2 TABLET(S): at 21:43

## 2025-02-07 RX ADMIN — MIRTAZAPINE 7.5 MILLIGRAM(S): 30 TABLET, FILM COATED ORAL at 11:56

## 2025-02-07 RX ADMIN — DEXTROMETHORPHAN HBR AND GUAIFENESIN ORAL SOLUTION 10 MILLILITER(S): 10; 100 LIQUID ORAL at 18:41

## 2025-02-07 RX ADMIN — ASPIRIN 81 MILLIGRAM(S): 81 TABLET, COATED ORAL at 11:56

## 2025-02-07 RX ADMIN — LEVETIRACETAM 500 MILLIGRAM(S): 750 TABLET, FILM COATED ORAL at 06:09

## 2025-02-07 RX ADMIN — ENOXAPARIN SODIUM 40 MILLIGRAM(S): 100 INJECTION SUBCUTANEOUS at 06:11

## 2025-02-07 RX ADMIN — LEVETIRACETAM 500 MILLIGRAM(S): 750 TABLET, FILM COATED ORAL at 18:40

## 2025-02-07 RX ADMIN — Medication 1 TABLET(S): at 18:40

## 2025-02-07 RX ADMIN — RIVASTIGMINE 1 PATCH: 4.6 PATCH, EXTENDED RELEASE TRANSDERMAL at 07:00

## 2025-02-07 RX ADMIN — Medication 2 TABLET(S): at 06:10

## 2025-02-07 RX ADMIN — RIVASTIGMINE 1 PATCH: 4.6 PATCH, EXTENDED RELEASE TRANSDERMAL at 06:18

## 2025-02-07 RX ADMIN — ENTACAPONE 200 MILLIGRAM(S): 200 TABLET, FILM COATED ORAL at 21:39

## 2025-02-07 RX ADMIN — Medication 3.12 MILLIGRAM(S): at 06:10

## 2025-02-07 RX ADMIN — Medication 75 MILLIGRAM(S): at 11:55

## 2025-02-07 RX ADMIN — ATORVASTATIN CALCIUM 80 MILLIGRAM(S): 80 TABLET, FILM COATED ORAL at 21:39

## 2025-02-07 RX ADMIN — DEXTROMETHORPHAN HBR AND GUAIFENESIN ORAL SOLUTION 10 MILLILITER(S): 10; 100 LIQUID ORAL at 11:56

## 2025-02-07 RX ADMIN — ANTISEPTIC SURGICAL SCRUB 1 APPLICATION(S): 0.04 SOLUTION TOPICAL at 06:17

## 2025-02-07 RX ADMIN — DEXTROMETHORPHAN HBR AND GUAIFENESIN ORAL SOLUTION 10 MILLILITER(S): 10; 100 LIQUID ORAL at 06:11

## 2025-02-07 RX ADMIN — RIVASTIGMINE 1 PATCH: 4.6 PATCH, EXTENDED RELEASE TRANSDERMAL at 06:11

## 2025-02-07 RX ADMIN — Medication 3.12 MILLIGRAM(S): at 18:41

## 2025-02-07 RX ADMIN — Medication 50 MILLIGRAM(S): at 21:39

## 2025-02-07 RX ADMIN — Medication 2 TABLET(S): at 21:39

## 2025-02-07 RX ADMIN — Medication 5 MILLIGRAM(S): at 06:10

## 2025-02-07 NOTE — PROGRESS NOTE ADULT - ASSESSMENT
74 M PMH of Parkinson's, HTN, hx ischemic CVA x2, type 2 DM, dementia and chronic back pain, Hx of seizures on keppra BIBA from [place] facility  or generalized weakness, confusion, decreased PO intake and multiple witnessed falls over the past few days. He had two witnessed falls the day prior to admission where he was weak and slumped to the ground. No LOC, not on AC. Per staff, he is normally alert and oriented x 3 and able to perform ADLs, but for the past few days has been increasingly confused. He was evaluated in the ED the day prior for similar complaints, had a negative work up and was discharged. Infectious and trauma workup negative. On 1/21 patient was notes to have expressive aphasia and R facial droop. Evaluated by neurology, MRI ordered and showed acute stroke in the L corona radiata. CTA Head/Neck showed severe stenosis left MCA proximal M2. Moderate narrowing right proximal vertebral artery On 1/22 new onset L tongue deviation and mild dysarthria - repeat MRI brain with no new acute findings. ILR placed 1/24. NIHSS 4 on admission.    #Rehab for gait abnormality, dysarthria and expressive aphasia iso acute L corona radiata stroke  -Imaging as above  -s/p ILR 1/24  -s/p  EEG 1/22: generalized slowing, no seizures  - A1C 4.9, , TSH 2.81  -c/w ASA and Plavix for 90 days  -c/w Lipitor 80 mg qhs  -PT/OT/SLP/Neuropsych eval and treat   - mobility and ADLs and cognition improving    #HTN  -c/w amlodipine 5 mg daily  -c/w Coreg 3.125 mg q12h  -Monitor BP, stable in good range     #HLD  -c/w Lipitor 80 mg qhs    #DM2  - A1C with Estimated Average Glucose Result: 4.9: Method: Immunoassay - well controlled on no meds at this time.  - on low dose gabapentin at night. No c/o of pain or dysesthesias. d/c and monitor.    #Hx of seizures  -c/w Keppra 500 bid    #Hx of Parkinson’s Syndrome  #Hx of dementia. Mild and does not interfere with patient's ability to participate in and benefit from acute rehab.  -c/w home meds sinemet, entacapone, and rivastigmine     #Anxiety/depression  #Insomnia  -c/w mirtazapine 7.5 daily  -was on Trazadone 100mg qhs - given increased confusion, decreased to 50 mg hs   -c/w melatonin 3 mg qhs prn  - sleeping well and more alert during the day     -Pain control: tylenol prn    -GI/Bowel Mgmt: senna/Miralax    - Diet: Easy to chew with thins DASH/carb consistent           Precautions / PROPHYLAXIS:      - Falls      - DVT prophylaxis: Lovenox

## 2025-02-07 NOTE — PROGRESS NOTE ADULT - SUBJECTIVE AND OBJECTIVE BOX
Patient is a 74 y old male who presents with a chief complaint of acute stroke and Parkinson's Syndrome with decline in function (24 Jan 2025 13:28)      HPI:    74 M PMH of Parkinson's, HTN, hx ischemic CVA x2, type 2 DM, dementia and chronic back pain, Hx of seizures on keppra BIBA from [place] facility  or generalized weakness, confusion, decreased PO intake and multiple witnessed falls over the past few days. He had two witnessed falls the day prior to admission where he was weak and slumped to the ground. No LOC, not on AC. Per staff, he is normally alert and oriented x 3 and able to perform ADLs, but for the past few days has been increasingly confused. He was evaluated in the ED the day prior for similar complaints, had a negative work up and was discharged. Infectious and trauma workup negative. On 1/21 patient was notes to have expressive aphasia and R facial droop. Evaluated by neurology, MRI ordered and showed acute stroke in the L corona radiata. CTA Head/Neck showed severe stenosis left MCA proximal M2. Moderate narrowing right proximal vertebral artery On 1/22 new onset L tongue deviation and mild dysarthria - repeat MRI brain with no new acute findings. ILR placed 1/24.    Prior to admission, the patient was independent in ADLs and ambulation without an assistive device Patient now requires Reza for bed mobility, modA for transfers, ambulating 40’x2 with RW and modA,  modA for LBD. Therefore, there is a significant functional decline from baseline. Patients also with medical comorbidities of DM, HTN, requiring medication management and monitoring of vitals by Physiatry team and nursing. To return home it is in the patient’s best interest to have acute inpatient rehabilitative services to undergo intensive interdisciplinary therapy. Furthermore, the patient is motivated and is able to tolerate up to 3 hours of daily therapy for 5-6 days a week for a total of 15 hours a week. Evaluated by Physiatry and deemed to be an excellent candidate for admission to  for acute inpatient rehab. Admitted to Acute Rehab on 1/24/25    LS: Lives alone in a private home, 6STE  PLOF: independent in ADLs and ambulation with no AS     (24 Jan 2025 13:28)    TODAY'S SUBJECTIVE & REVIEW OF SYMPTOMS:  Patient was seen and assessed at bedside.   No overnight events.   Patient denies any new complaints at this time. continues to be on 1:1  Tolerating PT/OT.   Tolerating oral diet.   Voiding spontaneously.   Regular BM  Vital signs reviewed.      CLOF: Reza for bed mobility, min A for transfers, ambulating 75 ft. with RW and min A, dressing with min assist       Constitutional:    [   ] WNL           [   ] poor appetite   [   ] insomnia   [   ] tired   [ x] weak   Cardio:                [ x  ] WNL           [   ] CP   [   ] SY   [   ] palpitations               Resp:                   [ x  ] WNL           [   ] SOB   [   ] cough   [   ] wheezing   GI:                        [  x ] WNL           [   ] constipation   [   ] diarrhea   [   ] abdominal pain   [   ] nausea   [   ] emesis                                :                      [   x] WNL           [   ] LAM  [   ] dysuria   [   ] difficulty voiding             Endo:                   [  x ] WNL          [   ] polyuria   [   ] temperature intolerance                 Skin:                     [  x ] WNL          [   ] pain   [   ] wound   [   ] rash   MSK:                    [  x ] WNL          [   ] muscle pain   [   ] joint pain/ stiffness   [   ] muscle tenderness   [   ] swelling   Neuro:                 [ x  ] WNL          [   ] HA   [   ] change in vision   [   ] tremor   [   ] weakness   [   ]dysphagia              Cognitive:           [   ] WNL           [ x  ]confusion at times - feels 'off'   Psych:                  [   ] WNL           [   ] hallucinations   [   ]agitation   [   ] delusion   [ x  ]depression/ anxiety    PHYSICAL EXAM    Vital Signs Last 24 Hrs  T(C): 36.4 (02-07-25 @ 05:12), Max: 36.8 (02-06-25 @ 19:25)  T(F): 97.5 (02-07-25 @ 05:12), Max: 98.2 (02-06-25 @ 19:25)  HR: 69 (02-07-25 @ 05:12) (66 - 78)  BP: 127/81 (02-07-25 @ 05:12) (127/81 - 147/91)  ABP: --  ABP(mean): --  RR: 18 (02-07-25 @ 05:12) (18 - 18)  SpO2: 97% (02-07-25 @ 05:12) (97% - 98%)            General:[x   ] NAD, Resting Comfortable,   [   ] other:                                HEENT: [ x  ] NC/AT, EOMI, PERRL , Normal Conjunctivae,   [   ] other:  Cardio: [  x ] RRR, no murmur   [   ] other:                              Pulm: [  x ] No Respiratory Distress,  Lungs CTAB,   [   ] other:                       Abdomen: [x   ]ND/NT, Soft,   [   ] other:    : [ x  ] NO LAM CATHETER, [   ] LAM CATHETER- no meatal tear, no discharge, [   ] other:                                            MSK: [x   ] No joint swelling, Full ROM,   [   ] other:                                         Ext: [  x ]No C/C/E, No calf tenderness,   [   ]other:    Skin: [ x  ]intact,   [   ] other:                                                                   Neurological Examination:  Cognitive: [    ] AAO x 3,   [ x   ]  other: AOx2 - not place                                                                     Attention:  [  x  ] intact,   [  ]  other:                         Memory: [    ] intact,    [ x   ]  other:   2/3 delayed recall with verbal cues  Mood/Affect: [  x  ] wnl,    [    ]  other:                                                                             Communication: [    ]Fluent, no dysarthria, following commands:  [   x ] other: mild dysarthria, expressive aphasia, mild anomia, hypophonia   CN II - XII:  [    ] intact,  [ x   ] other: R facial droop                                                                                        Motor:   RIGHT UE: [   ] WNL,  [x   ] other: 4+/5  LEFT    UE: [  x ] WNL,  [   ] other:  RIGHT LE: [   ] WNL,  [x   ] other: 4+/5  LEFT    LE: [x   ] WNL,  [   ] other:    Tone: [  x ] wnl,   [    ]  other:  DTRs: [   ]symmetric, [ x  ] other: R patellar reflex brisk 3+  Coordination:   [x    ] intact,   [    ] other:                                                                           Sensory: [   x ] Intact to light touch,   [    ] other:    MEDICATIONS  (STANDING):  amLODIPine   Tablet 5 milliGRAM(s) Oral daily  aspirin  chewable 81 milliGRAM(s) Oral daily  atorvastatin 80 milliGRAM(s) Oral at bedtime  carbidopa/levodopa  25/100 2 Tablet(s) Oral three times a day  carbidopa/levodopa CR 50/200 1 Tablet(s) Oral <User Schedule>  carvedilol 3.125 milliGRAM(s) Oral every 12 hours  chlorhexidine 2% Cloths 1 Application(s) Topical <User Schedule>  clopidogrel Tablet 75 milliGRAM(s) Oral daily  dextrose 5%. 1000 milliLiter(s) (100 mL/Hr) IV Continuous <Continuous>  dextrose 5%. 1000 milliLiter(s) (50 mL/Hr) IV Continuous <Continuous>  dextrose 50% Injectable 25 Gram(s) IV Push once  enoxaparin Injectable 40 milliGRAM(s) SubCutaneous every 24 hours  entacapone 200 milliGRAM(s) Oral three times a day  glucagon  Injectable 1 milliGRAM(s) IntraMuscular once  guaifenesin/dextromethorphan Oral Liquid 10 milliLiter(s) Oral every 6 hours  insulin lispro (ADMELOG) corrective regimen sliding scale   SubCutaneous three times a day before meals  levETIRAcetam 500 milliGRAM(s) Oral two times a day  mirtazapine 7.5 milliGRAM(s) Oral daily  polyethylene glycol 3350 17 Gram(s) Oral daily  rivastigmine patch  9.5 mG/24 Hr(s). 1 Patch Transdermal every 24 hours  senna 2 Tablet(s) Oral at bedtime  traZODone 50 milliGRAM(s) Oral at bedtime    MEDICATIONS  (PRN):  acetaminophen     Tablet .. 650 milliGRAM(s) Oral every 6 hours PRN Temp greater or equal to 38C (100.4F), Mild Pain (1 - 3)  aluminum hydroxide/magnesium hydroxide/simethicone Suspension 30 milliLiter(s) Oral every 4 hours PRN Dyspepsia  dextrose Oral Gel 15 Gram(s) Oral once PRN Blood Glucose LESS THAN 70 milliGRAM(s)/deciliter  magnesium hydroxide Suspension 30 milliLiter(s) Oral daily PRN Constipation  melatonin 3 milliGRAM(s) Oral at bedtime PRN Insomnia          RECENT LABS/IMAGING                          15.4   9.34  )-----------( 173      ( 03 Feb 2025 06:27 )             45.3     02-03    142  |  105  |  17  ----------------------------<  74  4.7   |  23  |  1.1    Ca    8.7      03 Feb 2025 06:27  Mg     2.1     02-03    TPro  5.6[L]  /  Alb  3.7  /  TBili  0.7  /  DBili  x   /  AST  23  /  ALT  11  /  AlkPhos  93  02-03    CAPILLARY BLOOD GLUCOSE    POCT Blood Glucose.: 247 mg/dL (04 Feb 2025 16:12)  POCT Blood Glucose.: 201 mg/dL (04 Feb 2025 11:09)  POCT Blood Glucose.: 97 mg/dL (04 Feb 2025 07:24)  POCT Blood Glucose.: 111 mg/dL (03 Feb 2025 21:00)    POCT Blood Glucose.: 145 mg/dL (02-03-25 @ 11:47)  POCT Blood Glucose.: 106 mg/dL (02-03-25 @ 07:30)  POCT Blood Glucose.: 156 mg/dL (02-02-25 @ 21:52)  POCT Blood Glucose.: 128 mg/dL (02-02-25 @ 16:03)  POCT Blood Glucose.: 169 mg/dL (02-02-25 @ 11:19)  POCT Blood Glucose.: 139 mg/dL (02-02-25 @ 07:29)  POCT Blood Glucose.: 155 mg/dL (02-01-25 @ 21:01)  POCT Blood Glucose.: 95 mg/dL (02-01-25 @ 16:21)  POCT Blood Glucose.: 285 mg/dL (02-01-25 @ 11:27)  POCT Blood Glucose.: 99 mg/dL (02-01-25 @ 07:33)  POCT Blood Glucose.: 123 mg/dL (01-31-25 @ 20:46)  POCT Blood Glucose.: 102 mg/dL (01-31-25 @ 16:23)

## 2025-02-08 LAB
GLUCOSE BLDC GLUCOMTR-MCNC: 124 MG/DL — HIGH (ref 70–99)
GLUCOSE BLDC GLUCOMTR-MCNC: 133 MG/DL — HIGH (ref 70–99)
GLUCOSE BLDC GLUCOMTR-MCNC: 150 MG/DL — HIGH (ref 70–99)
GLUCOSE BLDC GLUCOMTR-MCNC: 152 MG/DL — HIGH (ref 70–99)
GLUCOSE BLDC GLUCOMTR-MCNC: 86 MG/DL — SIGNIFICANT CHANGE UP (ref 70–99)

## 2025-02-08 PROCEDURE — 70450 CT HEAD/BRAIN W/O DYE: CPT | Mod: 26

## 2025-02-08 RX ADMIN — DEXTROMETHORPHAN HBR AND GUAIFENESIN ORAL SOLUTION 10 MILLILITER(S): 10; 100 LIQUID ORAL at 06:37

## 2025-02-08 RX ADMIN — LEVETIRACETAM 500 MILLIGRAM(S): 750 TABLET, FILM COATED ORAL at 17:37

## 2025-02-08 RX ADMIN — LEVETIRACETAM 500 MILLIGRAM(S): 750 TABLET, FILM COATED ORAL at 06:37

## 2025-02-08 RX ADMIN — ASPIRIN 81 MILLIGRAM(S): 81 TABLET, COATED ORAL at 11:45

## 2025-02-08 RX ADMIN — ATORVASTATIN CALCIUM 80 MILLIGRAM(S): 80 TABLET, FILM COATED ORAL at 21:53

## 2025-02-08 RX ADMIN — Medication 50 MILLIGRAM(S): at 21:53

## 2025-02-08 RX ADMIN — ENTACAPONE 200 MILLIGRAM(S): 200 TABLET, FILM COATED ORAL at 21:53

## 2025-02-08 RX ADMIN — ENOXAPARIN SODIUM 40 MILLIGRAM(S): 100 INJECTION SUBCUTANEOUS at 06:38

## 2025-02-08 RX ADMIN — Medication 75 MILLIGRAM(S): at 11:45

## 2025-02-08 RX ADMIN — DEXTROMETHORPHAN HBR AND GUAIFENESIN ORAL SOLUTION 10 MILLILITER(S): 10; 100 LIQUID ORAL at 17:37

## 2025-02-08 RX ADMIN — Medication 1 TABLET(S): at 17:38

## 2025-02-08 RX ADMIN — Medication 2 TABLET(S): at 12:56

## 2025-02-08 RX ADMIN — Medication 5 MILLIGRAM(S): at 06:37

## 2025-02-08 RX ADMIN — Medication 2 TABLET(S): at 21:53

## 2025-02-08 RX ADMIN — Medication 2 TABLET(S): at 06:37

## 2025-02-08 RX ADMIN — DEXTROMETHORPHAN HBR AND GUAIFENESIN ORAL SOLUTION 10 MILLILITER(S): 10; 100 LIQUID ORAL at 11:47

## 2025-02-08 RX ADMIN — Medication 3.12 MILLIGRAM(S): at 17:38

## 2025-02-08 RX ADMIN — RIVASTIGMINE 1 PATCH: 4.6 PATCH, EXTENDED RELEASE TRANSDERMAL at 07:01

## 2025-02-08 RX ADMIN — Medication 1: at 16:57

## 2025-02-08 RX ADMIN — ENTACAPONE 200 MILLIGRAM(S): 200 TABLET, FILM COATED ORAL at 06:37

## 2025-02-08 RX ADMIN — RIVASTIGMINE 1 PATCH: 4.6 PATCH, EXTENDED RELEASE TRANSDERMAL at 06:39

## 2025-02-08 RX ADMIN — ANTISEPTIC SURGICAL SCRUB 1 APPLICATION(S): 0.04 SOLUTION TOPICAL at 06:38

## 2025-02-08 RX ADMIN — MIRTAZAPINE 7.5 MILLIGRAM(S): 30 TABLET, FILM COATED ORAL at 11:44

## 2025-02-08 RX ADMIN — Medication 3.12 MILLIGRAM(S): at 06:37

## 2025-02-08 RX ADMIN — ENTACAPONE 200 MILLIGRAM(S): 200 TABLET, FILM COATED ORAL at 12:55

## 2025-02-08 NOTE — PROGRESS NOTE ADULT - SUBJECTIVE AND OBJECTIVE BOX
Patient is a 74 y old male who presents with a chief complaint of acute stroke and Parkinson's Syndrome with decline in function (24 Jan 2025 13:28)      HPI:    74 M PMH of Parkinson's, HTN, hx ischemic CVA x2, type 2 DM, dementia and chronic back pain, Hx of seizures on keppra BIBA from [facility] facility  or generalized weakness, confusion, decreased PO intake and multiple witnessed falls over the past few days. He had two witnessed falls the day prior to admission where he was weak and slumped to the ground. No LOC, not on AC. Per staff, he is normally alert and oriented x 3 and able to perform ADLs, but for the past few days has been increasingly confused. He was evaluated in the ED the day prior for similar complaints, had a negative work up and was discharged. Infectious and trauma workup negative. On 1/21 patient was notes to have expressive aphasia and R facial droop. Evaluated by neurology, MRI ordered and showed acute stroke in the L corona radiata. CTA Head/Neck showed severe stenosis left MCA proximal M2. Moderate narrowing right proximal vertebral artery On 1/22 new onset L tongue deviation and mild dysarthria - repeat MRI brain with no new acute findings. ILR placed 1/24.    Prior to admission, the patient was independent in ADLs and ambulation without an assistive device Patient now requires Reza for bed mobility, modA for transfers, ambulating 40’x2 with RW and modA,  modA for LBD. Therefore, there is a significant functional decline from baseline. Patients also with medical comorbidities of DM, HTN, requiring medication management and monitoring of vitals by Physiatry team and nursing. To return home it is in the patient’s best interest to have acute inpatient rehabilitative services to undergo intensive interdisciplinary therapy. Furthermore, the patient is motivated and is able to tolerate up to 3 hours of daily therapy for 5-6 days a week for a total of 15 hours a week. Evaluated by Physiatry and deemed to be an excellent candidate for admission to  for acute inpatient rehab. Admitted to Acute Rehab on 1/24/25    LS: Lives alone in a private home, 6STE  PLOF: independent in ADLs and ambulation with no AS     (24 Jan 2025 13:28)    TODAY'S SUBJECTIVE & REVIEW OF SYMPTOMS:  Patient was seen and assessed at bedside.   Overnight patient sustained a fall in the bathroom stating he hit his knee and head. patient denies headache, vision changes, N/V, new onset focal weakness. PE unremarkable. CTH ordered.   Tolerating PT/OT.   Tolerating oral diet.   Voiding spontaneously.   Regular BM  Vital signs reviewed.      CLOF: Reza for bed mobility, min A for transfers, ambulating 75 ft. with RW and min A, dressing with min assist       Constitutional:    [   ] WNL           [   ] poor appetite   [   ] insomnia   [   ] tired   [ x] weak   Cardio:                [ x  ] WNL           [   ] CP   [   ] SY   [   ] palpitations               Resp:                   [ x  ] WNL           [   ] SOB   [   ] cough   [   ] wheezing   GI:                        [  x ] WNL           [   ] constipation   [   ] diarrhea   [   ] abdominal pain   [   ] nausea   [   ] emesis                                :                      [   x] WNL           [   ] LAM  [   ] dysuria   [   ] difficulty voiding             Endo:                   [  x ] WNL          [   ] polyuria   [   ] temperature intolerance                 Skin:                     [  x ] WNL          [   ] pain   [   ] wound   [   ] rash   MSK:                    [  x ] WNL          [   ] muscle pain   [   ] joint pain/ stiffness   [   ] muscle tenderness   [   ] swelling   Neuro:                 [ x  ] WNL          [   ] HA   [   ] change in vision   [   ] tremor   [   ] weakness   [   ]dysphagia              Cognitive:           [   ] WNL           [ x  ]confusion at times - feels 'off'   Psych:                  [   ] WNL           [   ] hallucinations   [   ]agitation   [   ] delusion   [ x  ]depression/ anxiety    PHYSICAL EXAM    T(C): 36.5 (02-08-25 @ 05:56), Max: 36.8 (02-07-25 @ 13:29)  T(F): 97.7 (02-08-25 @ 05:56), Max: 98.3 (02-07-25 @ 13:29)  HR: 85 (02-08-25 @ 05:56) (75 - 89)  BP: 152/97 (02-08-25 @ 05:56) (117/73 - 152/97)  ABP: --  ABP(mean): --  RR: 19 (02-08-25 @ 05:56) (18 - 19)  SpO2: 98% (02-08-25 @ 05:56) (97% - 98%)            General:[x   ] NAD, Resting Comfortable,   [   ] other:                                HEENT: [ x  ] NC/AT, EOMI, PERRL , Normal Conjunctivae,   [   ] other:  Cardio: [  x ] RRR, no murmur   [   ] other:                              Pulm: [  x ] No Respiratory Distress,  Lungs CTAB,   [   ] other:                       Abdomen: [x   ]ND/NT, Soft,   [   ] other:    : [ x  ] NO LAM CATHETER, [   ] LAM CATHETER- no meatal tear, no discharge, [   ] other:                                            MSK: [x   ] No joint swelling, Full ROM,   [   ] other:                                         Ext: [  x ]No C/C/E, No calf tenderness,   [   ]other:    Skin: [ x  ]intact,   [   ] other:                                                                   Neurological Examination:  Cognitive: [    ] AAO x 3,   [ x   ]  other: AOx2 - not place                                                                     Attention:  [  x  ] intact,   [  ]  other:                         Memory: [    ] intact,    [ x   ]  other:   2/3 delayed recall with verbal cues  Mood/Affect: [  x  ] wnl,    [    ]  other:                                                                             Communication: [    ]Fluent, no dysarthria, following commands:  [   x ] other: mild dysarthria, expressive aphasia, mild anomia, hypophonia   CN II - XII:  [    ] intact,  [ x   ] other: R facial droop                                                                                        Motor:   RIGHT UE: [   ] WNL,  [x   ] other: 4+/5  LEFT    UE: [  x ] WNL,  [   ] other:  RIGHT LE: [   ] WNL,  [x   ] other: 4+/5  LEFT    LE: [x   ] WNL,  [   ] other:    Tone: [  x ] wnl,   [    ]  other:  DTRs: [   ]symmetric, [ x  ] other: R patellar reflex brisk 3+  Coordination:   [x    ] intact,   [    ] other:                                                                           Sensory: [   x ] Intact to light touch,   [    ] other:    MEDICATIONS  (STANDING):  amLODIPine   Tablet 5 milliGRAM(s) Oral daily  aspirin  chewable 81 milliGRAM(s) Oral daily  atorvastatin 80 milliGRAM(s) Oral at bedtime  carbidopa/levodopa  25/100 2 Tablet(s) Oral three times a day  carbidopa/levodopa CR 50/200 1 Tablet(s) Oral <User Schedule>  carvedilol 3.125 milliGRAM(s) Oral every 12 hours  chlorhexidine 2% Cloths 1 Application(s) Topical <User Schedule>  clopidogrel Tablet 75 milliGRAM(s) Oral daily  dextrose 5%. 1000 milliLiter(s) (100 mL/Hr) IV Continuous <Continuous>  dextrose 5%. 1000 milliLiter(s) (50 mL/Hr) IV Continuous <Continuous>  dextrose 50% Injectable 25 Gram(s) IV Push once  enoxaparin Injectable 40 milliGRAM(s) SubCutaneous every 24 hours  entacapone 200 milliGRAM(s) Oral three times a day  glucagon  Injectable 1 milliGRAM(s) IntraMuscular once  guaifenesin/dextromethorphan Oral Liquid 10 milliLiter(s) Oral every 6 hours  insulin lispro (ADMELOG) corrective regimen sliding scale   SubCutaneous three times a day before meals  levETIRAcetam 500 milliGRAM(s) Oral two times a day  mirtazapine 7.5 milliGRAM(s) Oral daily  polyethylene glycol 3350 17 Gram(s) Oral daily  rivastigmine patch  9.5 mG/24 Hr(s). 1 Patch Transdermal every 24 hours  senna 2 Tablet(s) Oral at bedtime  traZODone 50 milliGRAM(s) Oral at bedtime    MEDICATIONS  (PRN):  acetaminophen     Tablet .. 650 milliGRAM(s) Oral every 6 hours PRN Temp greater or equal to 38C (100.4F), Mild Pain (1 - 3)  aluminum hydroxide/magnesium hydroxide/simethicone Suspension 30 milliLiter(s) Oral every 4 hours PRN Dyspepsia  dextrose Oral Gel 15 Gram(s) Oral once PRN Blood Glucose LESS THAN 70 milliGRAM(s)/deciliter  magnesium hydroxide Suspension 30 milliLiter(s) Oral daily PRN Constipation  melatonin 3 milliGRAM(s) Oral at bedtime PRN Insomnia          RECENT LABS/IMAGING                          15.4   9.34  )-----------( 173      ( 03 Feb 2025 06:27 )             45.3     02-03    142  |  105  |  17  ----------------------------<  74  4.7   |  23  |  1.1    Ca    8.7      03 Feb 2025 06:27  Mg     2.1     02-03    TPro  5.6[L]  /  Alb  3.7  /  TBili  0.7  /  DBili  x   /  AST  23  /  ALT  11  /  AlkPhos  93  02-03    CAPILLARY BLOOD GLUCOSE    POCT Blood Glucose.: 247 mg/dL (04 Feb 2025 16:12)  POCT Blood Glucose.: 201 mg/dL (04 Feb 2025 11:09)  POCT Blood Glucose.: 97 mg/dL (04 Feb 2025 07:24)  POCT Blood Glucose.: 111 mg/dL (03 Feb 2025 21:00)    POCT Blood Glucose.: 145 mg/dL (02-03-25 @ 11:47)  POCT Blood Glucose.: 106 mg/dL (02-03-25 @ 07:30)  POCT Blood Glucose.: 156 mg/dL (02-02-25 @ 21:52)  POCT Blood Glucose.: 128 mg/dL (02-02-25 @ 16:03)  POCT Blood Glucose.: 169 mg/dL (02-02-25 @ 11:19)  POCT Blood Glucose.: 139 mg/dL (02-02-25 @ 07:29)  POCT Blood Glucose.: 155 mg/dL (02-01-25 @ 21:01)  POCT Blood Glucose.: 95 mg/dL (02-01-25 @ 16:21)  POCT Blood Glucose.: 285 mg/dL (02-01-25 @ 11:27)  POCT Blood Glucose.: 99 mg/dL (02-01-25 @ 07:33)  POCT Blood Glucose.: 123 mg/dL (01-31-25 @ 20:46)  POCT Blood Glucose.: 102 mg/dL (01-31-25 @ 16:23)       Patient is a 74 y old male who presents with a chief complaint of acute stroke and Parkinson's Syndrome with decline in function (24 Jan 2025 13:28)      HPI:    74 M PMH of Parkinson's, HTN, hx ischemic CVA x2, type 2 DM, dementia and chronic back pain, Hx of seizures on keppra BIBA from [facility] facility  or generalized weakness, confusion, decreased PO intake and multiple witnessed falls over the past few days. He had two witnessed falls the day prior to admission where he was weak and slumped to the ground. No LOC, not on AC. Per staff, he is normally alert and oriented x 3 and able to perform ADLs, but for the past few days has been increasingly confused. He was evaluated in the ED the day prior for similar complaints, had a negative work up and was discharged. Infectious and trauma workup negative. On 1/21 patient was notes to have expressive aphasia and R facial droop. Evaluated by neurology, MRI ordered and showed acute stroke in the L corona radiata. CTA Head/Neck showed severe stenosis left MCA proximal M2. Moderate narrowing right proximal vertebral artery On 1/22 new onset L tongue deviation and mild dysarthria - repeat MRI brain with no new acute findings. ILR placed 1/24.    Prior to admission, the patient was independent in ADLs and ambulation without an assistive device Patient now requires Reza for bed mobility, modA for transfers, ambulating 40’x2 with RW and modA,  modA for LBD. Therefore, there is a significant functional decline from baseline. Patients also with medical comorbidities of DM, HTN, requiring medication management and monitoring of vitals by Physiatry team and nursing. To return home it is in the patient’s best interest to have acute inpatient rehabilitative services to undergo intensive interdisciplinary therapy. Furthermore, the patient is motivated and is able to tolerate up to 3 hours of daily therapy for 5-6 days a week for a total of 15 hours a week. Evaluated by Physiatry and deemed to be an excellent candidate for admission to  for acute inpatient rehab. Admitted to Acute Rehab on 1/24/25    LS: Lives alone in a private home, 6STE  PLOF: independent in ADLs and ambulation with no AS     (24 Jan 2025 13:28)    TODAY'S SUBJECTIVE & REVIEW OF SYMPTOMS:  Patient was seen and assessed at bedside.   Overnight patient sustained a fall in the bathroom stating he hit his knee and head. The patient denies headache, vision changes, N/V, new onset focal weakness. PE unremarkable. CTH ordered.   Tolerating PT/OT.   Tolerating oral diet.   Voiding spontaneously.   Regular BM  Vital signs reviewed.      CLOF: Reza for bed mobility, min A for transfers, ambulating 75 ft. with RW and min A, dressing with min assist       Constitutional:    [   ] WNL           [   ] poor appetite   [   ] insomnia   [   ] tired   [ x] weak   Cardio:                [ x  ] WNL           [   ] CP   [   ] SY   [   ] palpitations               Resp:                   [ x  ] WNL           [   ] SOB   [   ] cough   [   ] wheezing   GI:                        [  x ] WNL           [   ] constipation   [   ] diarrhea   [   ] abdominal pain   [   ] nausea   [   ] emesis                                :                      [   x] WNL           [   ] LAM  [   ] dysuria   [   ] difficulty voiding             Endo:                   [  x ] WNL          [   ] polyuria   [   ] temperature intolerance                 Skin:                     [  x ] WNL          [   ] pain   [   ] wound   [   ] rash   MSK:                    [  x ] WNL          [   ] muscle pain   [   ] joint pain/ stiffness   [   ] muscle tenderness   [   ] swelling   Neuro:                 [ x  ] WNL          [   ] HA   [   ] change in vision   [   ] tremor   [   ] weakness   [   ]dysphagia              Cognitive:           [   ] WNL           [ x  ]confusion at times - feels 'off'   Psych:                  [   ] WNL           [   ] hallucinations   [   ]agitation   [   ] delusion   [ x  ]depression/ anxiety    PHYSICAL EXAM    T(C): 36.5 (02-08-25 @ 05:56), Max: 36.8 (02-07-25 @ 13:29)  T(F): 97.7 (02-08-25 @ 05:56), Max: 98.3 (02-07-25 @ 13:29)  HR: 85 (02-08-25 @ 05:56) (75 - 89)  BP: 152/97 (02-08-25 @ 05:56) (117/73 - 152/97)  ABP: --  ABP(mean): --  RR: 19 (02-08-25 @ 05:56) (18 - 19)  SpO2: 98% (02-08-25 @ 05:56) (97% - 98%)            General:[x   ] NAD, Resting Comfortable,   [   ] other:                                HEENT: [ x  ] NC/AT, EOMI, PERRL , Normal Conjunctivae,   [   ] other:  Cardio: [  x ] RRR, no murmur   [   ] other:                              Pulm: [  x ] No Respiratory Distress,  Lungs CTAB,   [   ] other:                       Abdomen: [x   ]ND/NT, Soft,   [   ] other:    : [ x  ] NO LAM CATHETER, [   ] LAM CATHETER- no meatal tear, no discharge, [   ] other:                                            MSK: [x   ] No joint swelling, Full ROM,   [   ] other:                                         Ext: [  x ]No C/C/E, No calf tenderness,   [   ]other:    Skin: [ x  ]intact,   [   ] other:                                                                   Neurological Examination:  Cognitive: [    ] AAO x 3,   [ x   ]  other: AOx2 - not place                                                                     Attention:  [  x  ] intact,   [  ]  other:                         Memory: [    ] intact,    [ x   ]  other:   2/3 delayed recall with verbal cues  Mood/Affect: [  x  ] wnl,    [    ]  other:                                                                             Communication: [    ]Fluent, no dysarthria, following commands:  [   x ] other: mild dysarthria, expressive aphasia, mild anomia, hypophonia   CN II - XII:  [    ] intact,  [ x   ] other: R facial droop                                                                                        Motor:   RIGHT UE: [   ] WNL,  [x   ] other: 4+/5  LEFT    UE: [  x ] WNL,  [   ] other:  RIGHT LE: [   ] WNL,  [x   ] other: 4+/5  LEFT    LE: [x   ] WNL,  [   ] other:    Tone: [  x ] wnl,   [    ]  other:  DTRs: [   ]symmetric, [ x  ] other: R patellar reflex brisk 3+  Coordination:   [x    ] intact,   [    ] other:                                                                           Sensory: [   x ] Intact to light touch,   [    ] other:    MEDICATIONS  (STANDING):  amLODIPine   Tablet 5 milliGRAM(s) Oral daily  aspirin  chewable 81 milliGRAM(s) Oral daily  atorvastatin 80 milliGRAM(s) Oral at bedtime  carbidopa/levodopa  25/100 2 Tablet(s) Oral three times a day  carbidopa/levodopa CR 50/200 1 Tablet(s) Oral <User Schedule>  carvedilol 3.125 milliGRAM(s) Oral every 12 hours  chlorhexidine 2% Cloths 1 Application(s) Topical <User Schedule>  clopidogrel Tablet 75 milliGRAM(s) Oral daily  dextrose 5%. 1000 milliLiter(s) (100 mL/Hr) IV Continuous <Continuous>  dextrose 5%. 1000 milliLiter(s) (50 mL/Hr) IV Continuous <Continuous>  dextrose 50% Injectable 25 Gram(s) IV Push once  enoxaparin Injectable 40 milliGRAM(s) SubCutaneous every 24 hours  entacapone 200 milliGRAM(s) Oral three times a day  glucagon  Injectable 1 milliGRAM(s) IntraMuscular once  guaifenesin/dextromethorphan Oral Liquid 10 milliLiter(s) Oral every 6 hours  insulin lispro (ADMELOG) corrective regimen sliding scale   SubCutaneous three times a day before meals  levETIRAcetam 500 milliGRAM(s) Oral two times a day  mirtazapine 7.5 milliGRAM(s) Oral daily  polyethylene glycol 3350 17 Gram(s) Oral daily  rivastigmine patch  9.5 mG/24 Hr(s). 1 Patch Transdermal every 24 hours  senna 2 Tablet(s) Oral at bedtime  traZODone 50 milliGRAM(s) Oral at bedtime    MEDICATIONS  (PRN):  acetaminophen     Tablet .. 650 milliGRAM(s) Oral every 6 hours PRN Temp greater or equal to 38C (100.4F), Mild Pain (1 - 3)  aluminum hydroxide/magnesium hydroxide/simethicone Suspension 30 milliLiter(s) Oral every 4 hours PRN Dyspepsia  dextrose Oral Gel 15 Gram(s) Oral once PRN Blood Glucose LESS THAN 70 milliGRAM(s)/deciliter  magnesium hydroxide Suspension 30 milliLiter(s) Oral daily PRN Constipation  melatonin 3 milliGRAM(s) Oral at bedtime PRN Insomnia          RECENT LABS/IMAGING                          15.4   9.34  )-----------( 173      ( 03 Feb 2025 06:27 )             45.3     02-03    142  |  105  |  17  ----------------------------<  74  4.7   |  23  |  1.1    Ca    8.7      03 Feb 2025 06:27  Mg     2.1     02-03    TPro  5.6[L]  /  Alb  3.7  /  TBili  0.7  /  DBili  x   /  AST  23  /  ALT  11  /  AlkPhos  93  02-03    CAPILLARY BLOOD GLUCOSE    POCT Blood Glucose.: 247 mg/dL (04 Feb 2025 16:12)  POCT Blood Glucose.: 201 mg/dL (04 Feb 2025 11:09)  POCT Blood Glucose.: 97 mg/dL (04 Feb 2025 07:24)  POCT Blood Glucose.: 111 mg/dL (03 Feb 2025 21:00)    POCT Blood Glucose.: 145 mg/dL (02-03-25 @ 11:47)  POCT Blood Glucose.: 106 mg/dL (02-03-25 @ 07:30)  POCT Blood Glucose.: 156 mg/dL (02-02-25 @ 21:52)  POCT Blood Glucose.: 128 mg/dL (02-02-25 @ 16:03)  POCT Blood Glucose.: 169 mg/dL (02-02-25 @ 11:19)  POCT Blood Glucose.: 139 mg/dL (02-02-25 @ 07:29)  POCT Blood Glucose.: 155 mg/dL (02-01-25 @ 21:01)  POCT Blood Glucose.: 95 mg/dL (02-01-25 @ 16:21)  POCT Blood Glucose.: 285 mg/dL (02-01-25 @ 11:27)  POCT Blood Glucose.: 99 mg/dL (02-01-25 @ 07:33)  POCT Blood Glucose.: 123 mg/dL (01-31-25 @ 20:46)  POCT Blood Glucose.: 102 mg/dL (01-31-25 @ 16:23)

## 2025-02-08 NOTE — PROGRESS NOTE ADULT - ATTENDING COMMENTS
Patient seen and examined with the rehab resident. We discussed the case. I have directed the care. I edited the note. The patient requires acute rehab with 3 hours of daily therapies at least 5 out of 7 days and close physiatry follow up.    74 M PMH of Parkinson's, HTN, hx ischemic CVA x 2, type 2 DM, dementia and chronic back pain, Hx of seizures on Keppra BIBA from [facility]  or generalized weakness, confusion, decreased PO intake and multiple witnessed falls over the past few days. He had two witnessed falls the day prior to admission where he was weak and slumped to the ground. No LOC, not on AC. Per staff, he is normally alert and oriented x 3 and able to perform ADLs, but for the past few days has been increasingly confused. He was evaluated in the ED the day prior for similar complaints, had a negative work up and was discharged. Infectious and trauma workup negative. On 1/21 patient was notes to have expressive aphasia and R facial droop. Evaluated by neurology, MRI ordered and showed acute stroke in the L corona radiata. CTA Head/Neck showed severe stenosis left MCA proximal M2. Moderate narrowing right proximal vertebral artery On 1/22 new onset L tongue deviation and mild dysarthria - repeat MRI brain with no new acute findings. ILR placed 1/24. NIHSS 4 on admission.    #Rehab for gait abnormality, dysarthria and expressive aphasia iso acute L corona radiata stroke  -Imaging as above  -s/p ILR 1/24  -s/p  EEG 1/22: generalized slowing, no seizures  - A1C 4.9, , TSH 2.81  -c/w ASA and Plavix for 90 days  -c/w Lipitor 80 mg qhs  -PT/OT/SLP/Neuropsych eval and treat   - mobility and ADLs and cognition improving    #mechanical fall 2/8  - VSS and PE unremarkable  - f/u CTH     #HTN  -c/w amlodipine 5 mg daily  -c/w Coreg 3.125 mg q12h  -Monitor BP, stable in good range     #HLD  -c/w Lipitor 80 mg qhs    #DM2  - A1C with Estimated Average Glucose Result: 4.9: Method: Immunoassay - well controlled on no meds at this time.  - on low dose gabapentin at night. No c/o of pain or dysesthesias. d/c and monitor.    #Hx of seizures  -c/w Keppra 500 bid    #Hx of Parkinson’s Syndrome  #Hx of dementia. Mild and does not interfere with patient's ability to participate in and benefit from acute rehab.  -c/w home meds sinemet, entacapone, and rivastigmine     #Anxiety/depression  #Insomnia  -c/w mirtazapine 7.5 daily  -was on Trazadone 100 mg qhs - given increased confusion, decreased to 50 mg hs   -c/w melatonin 3 mg qhs prn  - sleeping well and more alert during the day     -Pain control: Tylenol prn    -GI/Bowel Mgmt: senna/Miralax    - Diet: Easy to chew with thins DASH/carb consistent           Precautions / PROPHYLAXIS:      - Falls      - DVT prophylaxis: Lovenox

## 2025-02-08 NOTE — CHART NOTE - NSCHARTNOTEFT_GEN_A_CORE
RN called to see patient. Patient had a fall in the bathroom.    PCA walked patient to the bathroom. Patient asked to close the bathroom door for privacy and PCA partially closed the door.  When got up from toilet he fell down. Patient states that he felt sleepy and fell on hip, denies any LOC but does report head trauma. Now denies any pain.    Moving all extremities. no hip pain. no bruise or tenderness over back of scalp, pupils equal and reactive to light.      Vitals: HR 85, /97, Temp = 97.7.  FGlucose = 124    Patient on Aspirin + plavix and prophylactic lovenox.    Will order CT head NC.    Spoke with Shanon Aguila on phone and gave medical update regarding fall today.

## 2025-02-08 NOTE — PROGRESS NOTE ADULT - ASSESSMENT
74 M PMH of Parkinson's, HTN, hx ischemic CVA x2, type 2 DM, dementia and chronic back pain, Hx of seizures on keppra BIBA from [facility] facility  or generalized weakness, confusion, decreased PO intake and multiple witnessed falls over the past few days. He had two witnessed falls the day prior to admission where he was weak and slumped to the ground. No LOC, not on AC. Per staff, he is normally alert and oriented x 3 and able to perform ADLs, but for the past few days has been increasingly confused. He was evaluated in the ED the day prior for similar complaints, had a negative work up and was discharged. Infectious and trauma workup negative. On 1/21 patient was notes to have expressive aphasia and R facial droop. Evaluated by neurology, MRI ordered and showed acute stroke in the L corona radiata. CTA Head/Neck showed severe stenosis left MCA proximal M2. Moderate narrowing right proximal vertebral artery On 1/22 new onset L tongue deviation and mild dysarthria - repeat MRI brain with no new acute findings. ILR placed 1/24. NIHSS 4 on admission.    #Rehab for gait abnormality, dysarthria and expressive aphasia iso acute L corona radiata stroke  -Imaging as above  -s/p ILR 1/24  -s/p  EEG 1/22: generalized slowing, no seizures  - A1C 4.9, , TSH 2.81  -c/w ASA and Plavix for 90 days  -c/w Lipitor 80 mg qhs  -PT/OT/SLP/Neuropsych eval and treat   - mobility and ADLs and cognition improving    #mechanical fall 2/8  - VSS and PE unremarkable  - f/u [facility]     #HTN  -c/w amlodipine 5 mg daily  -c/w Coreg 3.125 mg q12h  -Monitor BP, stable in good range     #HLD  -c/w Lipitor 80 mg qhs    #DM2  - A1C with Estimated Average Glucose Result: 4.9: Method: Immunoassay - well controlled on no meds at this time.  - on low dose gabapentin at night. No c/o of pain or dysesthesias. d/c and monitor.    #Hx of seizures  -c/w Keppra 500 bid    #Hx of Parkinson’s Syndrome  #Hx of dementia. Mild and does not interfere with patient's ability to participate in and benefit from acute rehab.  -c/w home meds sinemet, entacapone, and rivastigmine     #Anxiety/depression  #Insomnia  -c/w mirtazapine 7.5 daily  -was on Trazadone 100mg qhs - given increased confusion, decreased to 50 mg hs   -c/w melatonin 3 mg qhs prn  - sleeping well and more alert during the day     -Pain control: tylenol prn    -GI/Bowel Mgmt: senna/Miralax    - Diet: Easy to chew with thins DASH/carb consistent           Precautions / PROPHYLAXIS:      - Falls      - DVT prophylaxis: Lovenox     74 M PMH of Parkinson's, HTN, hx ischemic CVA x 2, type 2 DM, dementia and chronic back pain, Hx of seizures on Keppra BIBA from [facility] facility  or generalized weakness, confusion, decreased PO intake and multiple witnessed falls over the past few days. He had two witnessed falls the day prior to admission where he was weak and slumped to the ground. No LOC, not on AC. Per staff, he is normally alert and oriented x 3 and able to perform ADLs, but for the past few days has been increasingly confused. He was evaluated in the ED the day prior for similar complaints, had a negative work up and was discharged. Infectious and trauma workup negative. On 1/21 patient was notes to have expressive aphasia and R facial droop. Evaluated by neurology, MRI ordered and showed acute stroke in the L corona radiata. CTA Head/Neck showed severe stenosis left MCA proximal M2. Moderate narrowing right proximal vertebral artery On 1/22 new onset L tongue deviation and mild dysarthria - repeat MRI brain with no new acute findings. ILR placed 1/24. NIHSS 4 on admission.    #Rehab for gait abnormality, dysarthria and expressive aphasia iso acute L corona radiata stroke  -Imaging as above  -s/p ILR 1/24  -s/p  EEG 1/22: generalized slowing, no seizures  - A1C 4.9, , TSH 2.81  -c/w ASA and Plavix for 90 days  -c/w Lipitor 80 mg qhs  -PT/OT/SLP/Neuropsych eval and treat   - mobility and ADLs and cognition improving    #mechanical fall 2/8  - VSS and PE unremarkable  - f/u [facility]     #HTN  -c/w amlodipine 5 mg daily  -c/w Coreg 3.125 mg q12h  -Monitor BP, stable in good range     #HLD  -c/w Lipitor 80 mg qhs    #DM2  - A1C with Estimated Average Glucose Result: 4.9: Method: Immunoassay - well controlled on no meds at this time.  - on low dose gabapentin at night. No c/o of pain or dysesthesias. d/c and monitor.    #Hx of seizures  -c/w Keppra 500 bid    #Hx of Parkinson’s Syndrome  #Hx of dementia. Mild and does not interfere with patient's ability to participate in and benefit from acute rehab.  -c/w home meds sinemet, entacapone, and rivastigmine     #Anxiety/depression  #Insomnia  -c/w mirtazapine 7.5 daily  -was on Trazadone 100 mg qhs - given increased confusion, decreased to 50 mg hs   -c/w melatonin 3 mg qhs prn  - sleeping well and more alert during the day     -Pain control: Tylenol prn    -GI/Bowel Mgmt: senna/Miralax    - Diet: Easy to chew with thins DASH/carb consistent           Precautions / PROPHYLAXIS:      - Falls      - DVT prophylaxis: Lovenox

## 2025-02-09 LAB
GLUCOSE BLDC GLUCOMTR-MCNC: 100 MG/DL — HIGH (ref 70–99)
GLUCOSE BLDC GLUCOMTR-MCNC: 178 MG/DL — HIGH (ref 70–99)
GLUCOSE BLDC GLUCOMTR-MCNC: 216 MG/DL — HIGH (ref 70–99)
GLUCOSE BLDC GLUCOMTR-MCNC: 85 MG/DL — SIGNIFICANT CHANGE UP (ref 70–99)

## 2025-02-09 RX ADMIN — ENTACAPONE 200 MILLIGRAM(S): 200 TABLET, FILM COATED ORAL at 06:08

## 2025-02-09 RX ADMIN — Medication 2 TABLET(S): at 06:08

## 2025-02-09 RX ADMIN — ENTACAPONE 200 MILLIGRAM(S): 200 TABLET, FILM COATED ORAL at 22:01

## 2025-02-09 RX ADMIN — Medication 1: at 11:45

## 2025-02-09 RX ADMIN — RIVASTIGMINE 1 PATCH: 4.6 PATCH, EXTENDED RELEASE TRANSDERMAL at 06:13

## 2025-02-09 RX ADMIN — Medication 1 TABLET(S): at 17:45

## 2025-02-09 RX ADMIN — MIRTAZAPINE 7.5 MILLIGRAM(S): 30 TABLET, FILM COATED ORAL at 11:41

## 2025-02-09 RX ADMIN — ASPIRIN 81 MILLIGRAM(S): 81 TABLET, COATED ORAL at 11:41

## 2025-02-09 RX ADMIN — RIVASTIGMINE 1 PATCH: 4.6 PATCH, EXTENDED RELEASE TRANSDERMAL at 07:41

## 2025-02-09 RX ADMIN — LEVETIRACETAM 500 MILLIGRAM(S): 750 TABLET, FILM COATED ORAL at 06:08

## 2025-02-09 RX ADMIN — Medication 2 TABLET(S): at 22:01

## 2025-02-09 RX ADMIN — Medication 50 MILLIGRAM(S): at 22:01

## 2025-02-09 RX ADMIN — Medication 3.12 MILLIGRAM(S): at 17:45

## 2025-02-09 RX ADMIN — Medication 75 MILLIGRAM(S): at 11:42

## 2025-02-09 RX ADMIN — POLYETHYLENE GLYCOL 3350 17 GRAM(S): 17 POWDER, FOR SOLUTION ORAL at 11:49

## 2025-02-09 RX ADMIN — ANTISEPTIC SURGICAL SCRUB 1 APPLICATION(S): 0.04 SOLUTION TOPICAL at 06:14

## 2025-02-09 RX ADMIN — Medication 2 TABLET(S): at 12:51

## 2025-02-09 RX ADMIN — DEXTROMETHORPHAN HBR AND GUAIFENESIN ORAL SOLUTION 10 MILLILITER(S): 10; 100 LIQUID ORAL at 11:42

## 2025-02-09 RX ADMIN — ENTACAPONE 200 MILLIGRAM(S): 200 TABLET, FILM COATED ORAL at 12:51

## 2025-02-09 RX ADMIN — Medication 3.12 MILLIGRAM(S): at 06:14

## 2025-02-09 RX ADMIN — Medication 5 MILLIGRAM(S): at 06:08

## 2025-02-09 RX ADMIN — DEXTROMETHORPHAN HBR AND GUAIFENESIN ORAL SOLUTION 10 MILLILITER(S): 10; 100 LIQUID ORAL at 06:08

## 2025-02-09 RX ADMIN — LEVETIRACETAM 500 MILLIGRAM(S): 750 TABLET, FILM COATED ORAL at 17:45

## 2025-02-09 RX ADMIN — ATORVASTATIN CALCIUM 80 MILLIGRAM(S): 80 TABLET, FILM COATED ORAL at 22:01

## 2025-02-09 RX ADMIN — ENOXAPARIN SODIUM 40 MILLIGRAM(S): 100 INJECTION SUBCUTANEOUS at 06:07

## 2025-02-09 RX ADMIN — DEXTROMETHORPHAN HBR AND GUAIFENESIN ORAL SOLUTION 10 MILLILITER(S): 10; 100 LIQUID ORAL at 17:45

## 2025-02-09 NOTE — PROGRESS NOTE ADULT - SUBJECTIVE AND OBJECTIVE BOX
Patient is a 74 y old male who presents with a chief complaint of acute stroke and Parkinson's Syndrome with decline in function (24 Jan 2025 13:28)      HPI:    74 M PMH of Parkinson's, HTN, hx ischemic CVA x2, type 2 DM, dementia and chronic back pain, Hx of seizures on keppra BIBA from [facility] facility  or generalized weakness, confusion, decreased PO intake and multiple witnessed falls over the past few days. He had two witnessed falls the day prior to admission where he was weak and slumped to the ground. No LOC, not on AC. Per staff, he is normally alert and oriented x 3 and able to perform ADLs, but for the past few days has been increasingly confused. He was evaluated in the ED the day prior for similar complaints, had a negative work up and was discharged. Infectious and trauma workup negative. On 1/21 patient was notes to have expressive aphasia and R facial droop. Evaluated by neurology, MRI ordered and showed acute stroke in the L corona radiata. CTA Head/Neck showed severe stenosis left MCA proximal M2. Moderate narrowing right proximal vertebral artery On 1/22 new onset L tongue deviation and mild dysarthria - repeat MRI brain with no new acute findings. ILR placed 1/24.    Prior to admission, the patient was independent in ADLs and ambulation without an assistive device Patient now requires Reza for bed mobility, modA for transfers, ambulating 40’x2 with RW and modA,  modA for LBD. Therefore, there is a significant functional decline from baseline. Patients also with medical comorbidities of DM, HTN, requiring medication management and monitoring of vitals by Physiatry team and nursing. To return home it is in the patient’s best interest to have acute inpatient rehabilitative services to undergo intensive interdisciplinary therapy. Furthermore, the patient is motivated and is able to tolerate up to 3 hours of daily therapy for 5-6 days a week for a total of 15 hours a week. Evaluated by Physiatry and deemed to be an excellent candidate for admission to  for acute inpatient rehab. Admitted to Acute Rehab on 1/24/25    LS: Lives alone in a private home, 6STE  PLOF: independent in ADLs and ambulation with no AS     (24 Jan 2025 13:28)    TODAY'S SUBJECTIVE & REVIEW OF SYMPTOMS:    Overnight patient sustained a fall in the bathroom stating he hit his knee and head. The patient denies headache, vision changes, N/V, new onset focal weakness. PE unremarkable. CTH performed and pending official read.     CLOF: Bed mobility supervision; Transfers sup/touchA-partialA; ambulation 150 ft x2/RW/touchA; UBD partialA; LBD partialA       Constitutional:    [   ] WNL           [   ] poor appetite   [   ] insomnia   [   ] tired   [ x] weak   Cardio:                [ x  ] WNL           [   ] CP   [   ] SY   [   ] palpitations               Resp:                   [ x  ] WNL           [   ] SOB   [   ] cough   [   ] wheezing   GI:                        [  x ] WNL           [   ] constipation   [   ] diarrhea   [   ] abdominal pain   [   ] nausea   [   ] emesis                                :                      [   x] WNL           [   ] LAM  [   ] dysuria   [   ] difficulty voiding             Endo:                   [  x ] WNL          [   ] polyuria   [   ] temperature intolerance                 Skin:                     [  x ] WNL          [   ] pain   [   ] wound   [   ] rash   MSK:                    [  x ] WNL          [   ] muscle pain   [   ] joint pain/ stiffness   [   ] muscle tenderness   [   ] swelling   Neuro:                 [ x  ] WNL          [   ] HA   [   ] change in vision   [   ] tremor   [   ] weakness   [   ]dysphagia              Cognitive:           [   ] WNL           [ x  ]confusion at times - feels 'off'   Psych:                  [   ] WNL           [   ] hallucinations   [   ]agitation   [   ] delusion   [ x  ]depression/ anxiety    PHYSICAL EXAM  Vital Signs Last 24 Hrs  T(C): 36.3 (02-09-25 @ 06:04), Max: 36.4 (02-08-25 @ 12:00)  T(F): 97.3 (02-09-25 @ 06:04), Max: 97.6 (02-08-25 @ 12:00)  HR: 64 (02-09-25 @ 06:04) (64 - 81)  BP: 137/80 (02-09-25 @ 06:04) (131/79 - 137/80)  BP(mean): 99 (02-09-25 @ 06:04) (96 - 99)  RR: 17 (02-09-25 @ 06:04) (17 - 19)  SpO2: 98% (02-09-25 @ 06:04) (95% - 98%)      General:[x   ] NAD, Resting Comfortable,   [   ] other:                                HEENT: [ x  ] NC/AT, EOMI, PERRL , Normal Conjunctivae,   [   ] other:  Cardio: [  x ] RRR, no murmur   [   ] other:                              Pulm: [  x ] No Respiratory Distress,  Lungs CTAB,   [   ] other:                       Abdomen: [x   ]ND/NT, Soft,   [   ] other:    : [ x  ] NO LAM CATHETER, [   ] LAM CATHETER- no meatal tear, no discharge, [   ] other:                                            MSK: [x   ] No joint swelling, Full ROM,   [   ] other:                                         Ext: [  x ]No C/C/E, No calf tenderness,   [   ]other:    Skin: [ x  ]intact,   [   ] other:                                                                   Neurological Examination:  Cognitive: [    ] AAO x 3,   [ x   ]  other: AOx2 - not place                                                                     Attention:  [  x  ] intact,   [  ]  other:                         Memory: [    ] intact,    [ x   ]  other:   2/3 delayed recall with verbal cues  Mood/Affect: [  x  ] wnl,    [    ]  other:                                                                             Communication: [    ]Fluent, no dysarthria, following commands:  [   x ] other: mild dysarthria, expressive aphasia, mild anomia, hypophonia   CN II - XII:  [    ] intact,  [ x   ] other: R facial droop                                                                                        Motor:   RIGHT UE: [   ] WNL,  [x   ] other: 4+/5  LEFT    UE: [  x ] WNL,  [   ] other:  RIGHT LE: [   ] WNL,  [x   ] other: 4+/5  LEFT    LE: [x   ] WNL,  [   ] other:    Tone: [  x ] wnl,   [    ]  other:  DTRs: [   ]symmetric, [ x  ] other: R patellar reflex brisk 3+  Coordination:   [x    ] intact,   [    ] other:                                                                           Sensory: [   x ] Intact to light touch,   [    ] other:    MEDICATIONS  (STANDING):  amLODIPine   Tablet 5 milliGRAM(s) Oral daily  aspirin  chewable 81 milliGRAM(s) Oral daily  atorvastatin 80 milliGRAM(s) Oral at bedtime  carbidopa/levodopa  25/100 2 Tablet(s) Oral three times a day  carbidopa/levodopa CR 50/200 1 Tablet(s) Oral <User Schedule>  carvedilol 3.125 milliGRAM(s) Oral every 12 hours  chlorhexidine 2% Cloths 1 Application(s) Topical <User Schedule>  clopidogrel Tablet 75 milliGRAM(s) Oral daily  dextrose 5%. 1000 milliLiter(s) (100 mL/Hr) IV Continuous <Continuous>  dextrose 5%. 1000 milliLiter(s) (50 mL/Hr) IV Continuous <Continuous>  dextrose 50% Injectable 25 Gram(s) IV Push once  enoxaparin Injectable 40 milliGRAM(s) SubCutaneous every 24 hours  entacapone 200 milliGRAM(s) Oral three times a day  glucagon  Injectable 1 milliGRAM(s) IntraMuscular once  guaifenesin/dextromethorphan Oral Liquid 10 milliLiter(s) Oral every 6 hours  insulin lispro (ADMELOG) corrective regimen sliding scale   SubCutaneous three times a day before meals  levETIRAcetam 500 milliGRAM(s) Oral two times a day  mirtazapine 7.5 milliGRAM(s) Oral daily  polyethylene glycol 3350 17 Gram(s) Oral daily  rivastigmine patch  9.5 mG/24 Hr(s). 1 Patch Transdermal every 24 hours  senna 2 Tablet(s) Oral at bedtime  traZODone 50 milliGRAM(s) Oral at bedtime    MEDICATIONS  (PRN):  acetaminophen     Tablet .. 650 milliGRAM(s) Oral every 6 hours PRN Temp greater or equal to 38C (100.4F), Mild Pain (1 - 3)  aluminum hydroxide/magnesium hydroxide/simethicone Suspension 30 milliLiter(s) Oral every 4 hours PRN Dyspepsia  dextrose Oral Gel 15 Gram(s) Oral once PRN Blood Glucose LESS THAN 70 milliGRAM(s)/deciliter  magnesium hydroxide Suspension 30 milliLiter(s) Oral daily PRN Constipation  melatonin 3 milliGRAM(s) Oral at bedtime PRN Insomnia      RECENT LABS/IMAGING              POCT Blood Glucose.: 85 mg/dL (02-09-25 @ 07:28)  POCT Blood Glucose.: 133 mg/dL (02-08-25 @ 21:16)  POCT Blood Glucose.: 152 mg/dL (02-08-25 @ 16:26)  POCT Blood Glucose.: 86 mg/dL (02-08-25 @ 11:33)  POCT Blood Glucose.: 150 mg/dL (02-08-25 @ 08:10)  POCT Blood Glucose.: 124 mg/dL (02-08-25 @ 05:50)  POCT Blood Glucose.: 139 mg/dL (02-07-25 @ 21:03)  POCT Blood Glucose.: 101 mg/dL (02-07-25 @ 16:40)  POCT Blood Glucose.: 134 mg/dL (02-07-25 @ 10:48)  POCT Blood Glucose.: 103 mg/dL (02-07-25 @ 07:27)  POCT Blood Glucose.: 192 mg/dL (02-06-25 @ 21:03)  POCT Blood Glucose.: 113 mg/dL (02-06-25 @ 16:35)                          15.4   9.34  )-----------( 173      ( 03 Feb 2025 06:27 )             45.3     02-03    142  |  105  |  17  ----------------------------<  74  4.7   |  23  |  1.1    Ca    8.7      03 Feb 2025 06:27  Mg     2.1     02-03    TPro  5.6[L]  /  Alb  3.7  /  TBili  0.7  /  DBili  x   /  AST  23  /  ALT  11  /  AlkPhos  93  02-03    CAPILLARY BLOOD GLUCOSE    POCT Blood Glucose.: 247 mg/dL (04 Feb 2025 16:12)  POCT Blood Glucose.: 201 mg/dL (04 Feb 2025 11:09)  POCT Blood Glucose.: 97 mg/dL (04 Feb 2025 07:24)  POCT Blood Glucose.: 111 mg/dL (03 Feb 2025 21:00)    POCT Blood Glucose.: 145 mg/dL (02-03-25 @ 11:47)  POCT Blood Glucose.: 106 mg/dL (02-03-25 @ 07:30)  POCT Blood Glucose.: 156 mg/dL (02-02-25 @ 21:52)  POCT Blood Glucose.: 128 mg/dL (02-02-25 @ 16:03)  POCT Blood Glucose.: 169 mg/dL (02-02-25 @ 11:19)  POCT Blood Glucose.: 139 mg/dL (02-02-25 @ 07:29)  POCT Blood Glucose.: 155 mg/dL (02-01-25 @ 21:01)  POCT Blood Glucose.: 95 mg/dL (02-01-25 @ 16:21)  POCT Blood Glucose.: 285 mg/dL (02-01-25 @ 11:27)  POCT Blood Glucose.: 99 mg/dL (02-01-25 @ 07:33)  POCT Blood Glucose.: 123 mg/dL (01-31-25 @ 20:46)  POCT Blood Glucose.: 102 mg/dL (01-31-25 @ 16:23)       Patient is a 74 y old male who presents with a chief complaint of acute stroke and Parkinson's Syndrome with decline in function (24 Jan 2025 13:28)      HPI:    74 M PMH of Parkinson's, HTN, hx ischemic CVA x2, type 2 DM, dementia and chronic back pain, Hx of seizures on keppra BIBA from [facility] facility  or generalized weakness, confusion, decreased PO intake and multiple witnessed falls over the past few days. He had two witnessed falls the day prior to admission where he was weak and slumped to the ground. No LOC, not on AC. Per staff, he is normally alert and oriented x 3 and able to perform ADLs, but for the past few days has been increasingly confused. He was evaluated in the ED the day prior for similar complaints, had a negative work up and was discharged. Infectious and trauma workup negative. On 1/21 patient was notes to have expressive aphasia and R facial droop. Evaluated by neurology, MRI ordered and showed acute stroke in the L corona radiata. CTA Head/Neck showed severe stenosis left MCA proximal M2. Moderate narrowing right proximal vertebral artery On 1/22 new onset L tongue deviation and mild dysarthria - repeat MRI brain with no new acute findings. ILR placed 1/24.    Prior to admission, the patient was independent in ADLs and ambulation without an assistive device Patient now requires Reza for bed mobility, modA for transfers, ambulating 40’x2 with RW and modA,  modA for LBD. Therefore, there is a significant functional decline from baseline. Patients also with medical comorbidities of DM, HTN, requiring medication management and monitoring of vitals by Physiatry team and nursing. To return home it is in the patient’s best interest to have acute inpatient rehabilitative services to undergo intensive interdisciplinary therapy. Furthermore, the patient is motivated and is able to tolerate up to 3 hours of daily therapy for 5-6 days a week for a total of 15 hours a week. Evaluated by Physiatry and deemed to be an excellent candidate for admission to  for acute inpatient rehab. Admitted to Acute Rehab on 1/24/25    LS: Lives alone in a private home, 6STE  PLOF: independent in ADLs and ambulation with no AS     (24 Jan 2025 13:28)    TODAY'S SUBJECTIVE & REVIEW OF SYMPTOMS:  Patient was seen and examined at bedside this morning. No acute overnight events. No new complaints offered. Patient had a fall on morning of 2/8 with +HT and no LOC. Currently states no HA, lightheadedness, dizziness, nausea, vomiting, focal weakness and vision changes.  CTH performed and pending official read. Otherwise, patient in good spirits and participating/tolerating in therapies well. Voiding spontaneously and has a regular BM. Vitals reviewed.    CLOF: Bed mobility supervision; Transfers sup/touchA-partialA; ambulation 150 ft x2/RW/touchA; UBD partialA; LBD partialA       Constitutional:    [   ] WNL           [   ] poor appetite   [   ] insomnia   [   ] tired   [ x] weak   Cardio:                [ x  ] WNL           [   ] CP   [   ] SY   [   ] palpitations               Resp:                   [ x  ] WNL           [   ] SOB   [   ] cough   [   ] wheezing   GI:                        [  x ] WNL           [   ] constipation   [   ] diarrhea   [   ] abdominal pain   [   ] nausea   [   ] emesis                                :                      [   x] WNL           [   ] LAM  [   ] dysuria   [   ] difficulty voiding             Endo:                   [  x ] WNL          [   ] polyuria   [   ] temperature intolerance                 Skin:                     [  x ] WNL          [   ] pain   [   ] wound   [   ] rash   MSK:                    [  x ] WNL          [   ] muscle pain   [   ] joint pain/ stiffness   [   ] muscle tenderness   [   ] swelling   Neuro:                 [ x  ] WNL          [   ] HA   [   ] change in vision   [   ] tremor   [   ] weakness   [   ]dysphagia              Cognitive:           [   ] WNL           [ x  ]confusion at times - feels 'off'   Psych:                  [   ] WNL           [   ] hallucinations   [   ]agitation   [   ] delusion   [ x  ]depression/ anxiety    PHYSICAL EXAM  Vital Signs Last 24 Hrs  T(C): 36.3 (02-09-25 @ 06:04), Max: 36.4 (02-08-25 @ 12:00)  T(F): 97.3 (02-09-25 @ 06:04), Max: 97.6 (02-08-25 @ 12:00)  HR: 64 (02-09-25 @ 06:04) (64 - 81)  BP: 137/80 (02-09-25 @ 06:04) (131/79 - 137/80)  BP(mean): 99 (02-09-25 @ 06:04) (96 - 99)  RR: 17 (02-09-25 @ 06:04) (17 - 19)  SpO2: 98% (02-09-25 @ 06:04) (95% - 98%)      General:[x   ] NAD, Resting Comfortable,   [   ] other:                                HEENT: [ x  ] NC/AT, EOMI, PERRL , Normal Conjunctivae,   [   ] other:  Cardio: [  x ] RRR, no murmur   [   ] other:                              Pulm: [  x ] No Respiratory Distress,  Lungs CTAB,   [   ] other:                       Abdomen: [x   ]ND/NT, Soft,   [   ] other:    : [ x  ] NO LAM CATHETER, [   ] LAM CATHETER- no meatal tear, no discharge, [   ] other:                                            MSK: [x   ] No joint swelling, Full ROM,   [   ] other:                                         Ext: [  x ]No C/C/E, No calf tenderness,   [   ]other:    Skin: [ x  ]intact,   [   ] other:                                                                   Neurological Examination:  Cognitive: [    ] AAO x 3,   [ x   ]  other: AOx2 - not place                                                                     Attention:  [  x  ] intact,   [  ]  other:                         Memory: [    ] intact,    [ x   ]  other:   2/3 delayed recall with verbal cues  Mood/Affect: [  x  ] wnl,    [    ]  other:                                                                             Communication: [    ]Fluent, no dysarthria, following commands:  [   x ] other: mild dysarthria, expressive aphasia, mild anomia, hypophonia   CN II - XII:  [    ] intact,  [ x   ] other: R facial droop                                                                                        Motor:   RIGHT UE: [   ] WNL,  [x   ] other: 4+/5  LEFT    UE: [  x ] WNL,  [   ] other:  RIGHT LE: [   ] WNL,  [x   ] other: 4+/5  LEFT    LE: [x   ] WNL,  [   ] other:    Tone: [  x ] wnl,   [    ]  other:  DTRs: [   ]symmetric, [ x  ] other: R patellar reflex brisk 3+  Coordination:   [x    ] intact,   [    ] other:                                                                           Sensory: [   x ] Intact to light touch,   [    ] other:    MEDICATIONS  (STANDING):  amLODIPine   Tablet 5 milliGRAM(s) Oral daily  aspirin  chewable 81 milliGRAM(s) Oral daily  atorvastatin 80 milliGRAM(s) Oral at bedtime  carbidopa/levodopa  25/100 2 Tablet(s) Oral three times a day  carbidopa/levodopa CR 50/200 1 Tablet(s) Oral <User Schedule>  carvedilol 3.125 milliGRAM(s) Oral every 12 hours  chlorhexidine 2% Cloths 1 Application(s) Topical <User Schedule>  clopidogrel Tablet 75 milliGRAM(s) Oral daily  dextrose 5%. 1000 milliLiter(s) (100 mL/Hr) IV Continuous <Continuous>  dextrose 5%. 1000 milliLiter(s) (50 mL/Hr) IV Continuous <Continuous>  dextrose 50% Injectable 25 Gram(s) IV Push once  enoxaparin Injectable 40 milliGRAM(s) SubCutaneous every 24 hours  entacapone 200 milliGRAM(s) Oral three times a day  glucagon  Injectable 1 milliGRAM(s) IntraMuscular once  guaifenesin/dextromethorphan Oral Liquid 10 milliLiter(s) Oral every 6 hours  insulin lispro (ADMELOG) corrective regimen sliding scale   SubCutaneous three times a day before meals  levETIRAcetam 500 milliGRAM(s) Oral two times a day  mirtazapine 7.5 milliGRAM(s) Oral daily  polyethylene glycol 3350 17 Gram(s) Oral daily  rivastigmine patch  9.5 mG/24 Hr(s). 1 Patch Transdermal every 24 hours  senna 2 Tablet(s) Oral at bedtime  traZODone 50 milliGRAM(s) Oral at bedtime    MEDICATIONS  (PRN):  acetaminophen     Tablet .. 650 milliGRAM(s) Oral every 6 hours PRN Temp greater or equal to 38C (100.4F), Mild Pain (1 - 3)  aluminum hydroxide/magnesium hydroxide/simethicone Suspension 30 milliLiter(s) Oral every 4 hours PRN Dyspepsia  dextrose Oral Gel 15 Gram(s) Oral once PRN Blood Glucose LESS THAN 70 milliGRAM(s)/deciliter  magnesium hydroxide Suspension 30 milliLiter(s) Oral daily PRN Constipation  melatonin 3 milliGRAM(s) Oral at bedtime PRN Insomnia      RECENT LABS/IMAGING    POCT Blood Glucose.: 85 mg/dL (02-09-25 @ 07:28)  POCT Blood Glucose.: 133 mg/dL (02-08-25 @ 21:16)  POCT Blood Glucose.: 152 mg/dL (02-08-25 @ 16:26)  POCT Blood Glucose.: 86 mg/dL (02-08-25 @ 11:33)  POCT Blood Glucose.: 150 mg/dL (02-08-25 @ 08:10)  POCT Blood Glucose.: 124 mg/dL (02-08-25 @ 05:50)  POCT Blood Glucose.: 139 mg/dL (02-07-25 @ 21:03)  POCT Blood Glucose.: 101 mg/dL (02-07-25 @ 16:40)  POCT Blood Glucose.: 134 mg/dL (02-07-25 @ 10:48)  POCT Blood Glucose.: 103 mg/dL (02-07-25 @ 07:27)  POCT Blood Glucose.: 192 mg/dL (02-06-25 @ 21:03)  POCT Blood Glucose.: 113 mg/dL (02-06-25 @ 16:35)                          15.4   9.34  )-----------( 173      ( 03 Feb 2025 06:27 )             45.3     02-03    142  |  105  |  17  ----------------------------<  74  4.7   |  23  |  1.1    Ca    8.7      03 Feb 2025 06:27  Mg     2.1     02-03    TPro  5.6[L]  /  Alb  3.7  /  TBili  0.7  /  DBili  x   /  AST  23  /  ALT  11  /  AlkPhos  93  02-03    CAPILLARY BLOOD GLUCOSE    POCT Blood Glucose.: 247 mg/dL (04 Feb 2025 16:12)  POCT Blood Glucose.: 201 mg/dL (04 Feb 2025 11:09)  POCT Blood Glucose.: 97 mg/dL (04 Feb 2025 07:24)  POCT Blood Glucose.: 111 mg/dL (03 Feb 2025 21:00)    POCT Blood Glucose.: 145 mg/dL (02-03-25 @ 11:47)  POCT Blood Glucose.: 106 mg/dL (02-03-25 @ 07:30)  POCT Blood Glucose.: 156 mg/dL (02-02-25 @ 21:52)  POCT Blood Glucose.: 128 mg/dL (02-02-25 @ 16:03)  POCT Blood Glucose.: 169 mg/dL (02-02-25 @ 11:19)  POCT Blood Glucose.: 139 mg/dL (02-02-25 @ 07:29)  POCT Blood Glucose.: 155 mg/dL (02-01-25 @ 21:01)  POCT Blood Glucose.: 95 mg/dL (02-01-25 @ 16:21)  POCT Blood Glucose.: 285 mg/dL (02-01-25 @ 11:27)  POCT Blood Glucose.: 99 mg/dL (02-01-25 @ 07:33)  POCT Blood Glucose.: 123 mg/dL (01-31-25 @ 20:46)  POCT Blood Glucose.: 102 mg/dL (01-31-25 @ 16:23)       Patient is a 74 y old male who presents with a chief complaint of acute stroke and Parkinson's Syndrome with decline in function (24 Jan 2025 13:28)      HPI:    74 M PMH of Parkinson's, HTN, hx ischemic CVA x2, type 2 DM, dementia and chronic back pain, Hx of seizures on keppra BIBA from [facility] facility  or generalized weakness, confusion, decreased PO intake and multiple witnessed falls over the past few days. He had two witnessed falls the day prior to admission where he was weak and slumped to the ground. No LOC, not on AC. Per staff, he is normally alert and oriented x 3 and able to perform ADLs, but for the past few days has been increasingly confused. He was evaluated in the ED the day prior for similar complaints, had a negative work up and was discharged. Infectious and trauma workup negative. On 1/21 patient was notes to have expressive aphasia and R facial droop. Evaluated by neurology, MRI ordered and showed acute stroke in the L corona radiata. CTA Head/Neck showed severe stenosis left MCA proximal M2. Moderate narrowing right proximal vertebral artery On 1/22 new onset L tongue deviation and mild dysarthria - repeat MRI brain with no new acute findings. ILR placed 1/24.    Prior to admission, the patient was independent in ADLs and ambulation without an assistive device Patient now requires Reza for bed mobility, modA for transfers, ambulating 40’x2 with RW and modA,  modA for LBD. Therefore, there is a significant functional decline from baseline. Patients also with medical comorbidities of DM, HTN, requiring medication management and monitoring of vitals by Physiatry team and nursing. To return home it is in the patient’s best interest to have acute inpatient rehabilitative services to undergo intensive interdisciplinary therapy. Furthermore, the patient is motivated and is able to tolerate up to 3 hours of daily therapy for 5-6 days a week for a total of 15 hours a week. Evaluated by Physiatry and deemed to be an excellent candidate for admission to  for acute inpatient rehab. Admitted to Acute Rehab on 1/24/25    LS: Lives alone in a private home, 6STE  PLOF: independent in ADLs and ambulation with no AS     (24 Jan 2025 13:28)    TODAY'S SUBJECTIVE & REVIEW OF SYMPTOMS:  Patient was seen and examined at bedside this morning. No acute overnight events. No new complaints offered. Patient had a fall on morning of 2/8 with +HT and no LOC. Currently states no HA, lightheadedness, dizziness, nausea, vomiting, focal weakness and vision changes.  CTH performed and pending official read. Ecchymosis noted on RUE along triceps region. Otherwise, patient in good spirits and participating/tolerating in therapies well. Voiding spontaneously and has a regular BM. Vitals reviewed.    CLOF: Bed mobility supervision; Transfers sup/touchA-partialA; ambulation 150 ft x2/RW/touchA; UBD partialA; LBD partialA       Constitutional:    [   ] WNL           [   ] poor appetite   [   ] insomnia   [   ] tired   [ x] weak   Cardio:                [ x  ] WNL           [   ] CP   [   ] SY   [   ] palpitations               Resp:                   [ x  ] WNL           [   ] SOB   [   ] cough   [   ] wheezing   GI:                        [  x ] WNL           [   ] constipation   [   ] diarrhea   [   ] abdominal pain   [   ] nausea   [   ] emesis                                :                      [   x] WNL           [   ] LAM  [   ] dysuria   [   ] difficulty voiding             Endo:                   [  x ] WNL          [   ] polyuria   [   ] temperature intolerance                 Skin:                     [  x ] WNL          [   ] pain   [   ] wound   [   ] rash   MSK:                    [  x ] WNL          [   ] muscle pain   [   ] joint pain/ stiffness   [   ] muscle tenderness   [   ] swelling   Neuro:                 [ x  ] WNL          [   ] HA   [   ] change in vision   [   ] tremor   [   ] weakness   [   ]dysphagia              Cognitive:           [   ] WNL           [ x  ]confusion at times - feels 'off'   Psych:                  [   ] WNL           [   ] hallucinations   [   ]agitation   [   ] delusion   [ x  ]depression/ anxiety    PHYSICAL EXAM  Vital Signs Last 24 Hrs  T(C): 36.3 (02-09-25 @ 06:04), Max: 36.4 (02-08-25 @ 12:00)  T(F): 97.3 (02-09-25 @ 06:04), Max: 97.6 (02-08-25 @ 12:00)  HR: 64 (02-09-25 @ 06:04) (64 - 81)  BP: 137/80 (02-09-25 @ 06:04) (131/79 - 137/80)  BP(mean): 99 (02-09-25 @ 06:04) (96 - 99)  RR: 17 (02-09-25 @ 06:04) (17 - 19)  SpO2: 98% (02-09-25 @ 06:04) (95% - 98%)      General:[x   ] NAD, Resting Comfortable,   [   ] other:                                HEENT: [ x  ] NC/AT, EOMI, PERRL , Normal Conjunctivae,   [   ] other:  Cardio: [  x ] RRR, no murmur   [   ] other:                              Pulm: [  x ] No Respiratory Distress,  Lungs CTAB,   [   ] other:                       Abdomen: [x   ]ND/NT, Soft,   [   ] other:    : [ x  ] NO LAM CATHETER, [   ] LAM CATHETER- no meatal tear, no discharge, [   ] other:                                            MSK: [x   ] No joint swelling, Full ROM,   [   ] other:                                         Ext: [  x ]No C/C/E, No calf tenderness,   [   ]other:    Skin: [ x  ]intact,   [x   ] other:       Ecchymosis noted on RUE along triceps region                                                             Neurological Examination:  Cognitive: [    ] AAO x 3,   [ x   ]  other: AOx2 - not place                                                                     Attention:  [  x  ] intact,   [  ]  other:                         Memory: [    ] intact,    [ x   ]  other:   2/3 delayed recall with verbal cues  Mood/Affect: [  x  ] wnl,    [    ]  other:                                                                             Communication: [    ]Fluent, no dysarthria, following commands:  [   x ] other: mild dysarthria, expressive aphasia, mild anomia, hypophonia   CN II - XII:  [    ] intact,  [ x   ] other: R facial droop                                                                                        Motor:   RIGHT UE: [   ] WNL,  [x   ] other: 4+/5  LEFT    UE: [  x ] WNL,  [   ] other:  RIGHT LE: [   ] WNL,  [x   ] other: 4+/5  LEFT    LE: [x   ] WNL,  [   ] other:    Tone: [  x ] wnl,   [    ]  other:  DTRs: [   ]symmetric, [ x  ] other: R patellar reflex brisk 3+  Coordination:   [x    ] intact,   [    ] other:                                                                           Sensory: [   x ] Intact to light touch,   [    ] other:    MEDICATIONS  (STANDING):  amLODIPine   Tablet 5 milliGRAM(s) Oral daily  aspirin  chewable 81 milliGRAM(s) Oral daily  atorvastatin 80 milliGRAM(s) Oral at bedtime  carbidopa/levodopa  25/100 2 Tablet(s) Oral three times a day  carbidopa/levodopa CR 50/200 1 Tablet(s) Oral <User Schedule>  carvedilol 3.125 milliGRAM(s) Oral every 12 hours  chlorhexidine 2% Cloths 1 Application(s) Topical <User Schedule>  clopidogrel Tablet 75 milliGRAM(s) Oral daily  dextrose 5%. 1000 milliLiter(s) (100 mL/Hr) IV Continuous <Continuous>  dextrose 5%. 1000 milliLiter(s) (50 mL/Hr) IV Continuous <Continuous>  dextrose 50% Injectable 25 Gram(s) IV Push once  enoxaparin Injectable 40 milliGRAM(s) SubCutaneous every 24 hours  entacapone 200 milliGRAM(s) Oral three times a day  glucagon  Injectable 1 milliGRAM(s) IntraMuscular once  guaifenesin/dextromethorphan Oral Liquid 10 milliLiter(s) Oral every 6 hours  insulin lispro (ADMELOG) corrective regimen sliding scale   SubCutaneous three times a day before meals  levETIRAcetam 500 milliGRAM(s) Oral two times a day  mirtazapine 7.5 milliGRAM(s) Oral daily  polyethylene glycol 3350 17 Gram(s) Oral daily  rivastigmine patch  9.5 mG/24 Hr(s). 1 Patch Transdermal every 24 hours  senna 2 Tablet(s) Oral at bedtime  traZODone 50 milliGRAM(s) Oral at bedtime    MEDICATIONS  (PRN):  acetaminophen     Tablet .. 650 milliGRAM(s) Oral every 6 hours PRN Temp greater or equal to 38C (100.4F), Mild Pain (1 - 3)  aluminum hydroxide/magnesium hydroxide/simethicone Suspension 30 milliLiter(s) Oral every 4 hours PRN Dyspepsia  dextrose Oral Gel 15 Gram(s) Oral once PRN Blood Glucose LESS THAN 70 milliGRAM(s)/deciliter  magnesium hydroxide Suspension 30 milliLiter(s) Oral daily PRN Constipation  melatonin 3 milliGRAM(s) Oral at bedtime PRN Insomnia      RECENT LABS/IMAGING    POCT Blood Glucose.: 85 mg/dL (02-09-25 @ 07:28)  POCT Blood Glucose.: 133 mg/dL (02-08-25 @ 21:16)  POCT Blood Glucose.: 152 mg/dL (02-08-25 @ 16:26)  POCT Blood Glucose.: 86 mg/dL (02-08-25 @ 11:33)  POCT Blood Glucose.: 150 mg/dL (02-08-25 @ 08:10)  POCT Blood Glucose.: 124 mg/dL (02-08-25 @ 05:50)  POCT Blood Glucose.: 139 mg/dL (02-07-25 @ 21:03)  POCT Blood Glucose.: 101 mg/dL (02-07-25 @ 16:40)  POCT Blood Glucose.: 134 mg/dL (02-07-25 @ 10:48)  POCT Blood Glucose.: 103 mg/dL (02-07-25 @ 07:27)  POCT Blood Glucose.: 192 mg/dL (02-06-25 @ 21:03)  POCT Blood Glucose.: 113 mg/dL (02-06-25 @ 16:35)                          15.4   9.34  )-----------( 173      ( 03 Feb 2025 06:27 )             45.3     02-03    142  |  105  |  17  ----------------------------<  74  4.7   |  23  |  1.1    Ca    8.7      03 Feb 2025 06:27  Mg     2.1     02-03    TPro  5.6[L]  /  Alb  3.7  /  TBili  0.7  /  DBili  x   /  AST  23  /  ALT  11  /  AlkPhos  93  02-03    CAPILLARY BLOOD GLUCOSE    POCT Blood Glucose.: 247 mg/dL (04 Feb 2025 16:12)  POCT Blood Glucose.: 201 mg/dL (04 Feb 2025 11:09)  POCT Blood Glucose.: 97 mg/dL (04 Feb 2025 07:24)  POCT Blood Glucose.: 111 mg/dL (03 Feb 2025 21:00)    POCT Blood Glucose.: 145 mg/dL (02-03-25 @ 11:47)  POCT Blood Glucose.: 106 mg/dL (02-03-25 @ 07:30)  POCT Blood Glucose.: 156 mg/dL (02-02-25 @ 21:52)  POCT Blood Glucose.: 128 mg/dL (02-02-25 @ 16:03)  POCT Blood Glucose.: 169 mg/dL (02-02-25 @ 11:19)  POCT Blood Glucose.: 139 mg/dL (02-02-25 @ 07:29)  POCT Blood Glucose.: 155 mg/dL (02-01-25 @ 21:01)  POCT Blood Glucose.: 95 mg/dL (02-01-25 @ 16:21)  POCT Blood Glucose.: 285 mg/dL (02-01-25 @ 11:27)  POCT Blood Glucose.: 99 mg/dL (02-01-25 @ 07:33)  POCT Blood Glucose.: 123 mg/dL (01-31-25 @ 20:46)  POCT Blood Glucose.: 102 mg/dL (01-31-25 @ 16:23)

## 2025-02-09 NOTE — PROGRESS NOTE ADULT - ATTENDING COMMENTS
Patient seen and examined with the resident. We discussed the case. I have directed the care. I edited the note. The patient requires acute rehab with 3 hours of daily therapies at least 5 out of 7 days and close physiatry follow up.  #Rehab for gait abnormality, dysarthria and expressive aphasia iso acute L corona radiata stroke  -Imaging as above  -s/p ILR 1/24  -s/p  EEG 1/22: generalized slowing, no seizures  - A1C 4.9, , TSH 2.81  -c/w ASA and Plavix for 90 days  -c/w Lipitor 80 mg qhs  -PT/OT/SLP/Neuropsych eval and treat   - mobility and ADLs and cognition improving    #mechanical fall 2/8  - VSS and PE unremarkable  - f/u CTH; pending official read    #HTN  -c/w amlodipine 5 mg daily  -c/w Coreg 3.125 mg q12h  -Monitor BP, stable in good range     #HLD  -c/w Lipitor 80 mg qhs    #DM2  - A1C with Estimated Average Glucose Result: 4.9: Method: Immunoassay - well controlled on no meds at this time.  - on low dose gabapentin at night. No c/o of pain or dysesthesias. d/c and monitor.    #Hx of seizures  -c/w Keppra 500 bid    #Hx of Parkinson’s Syndrome  #Hx of dementia. Mild and does not interfere with patient's ability to participate in and benefit from acute rehab.  -c/w home meds sinemet, entacapone, and rivastigmine     #Anxiety/depression  #Insomnia  -c/w mirtazapine 7.5 daily  -was on Trazadone 100 mg qhs - given increased confusion, decreased to 50 mg hs   -c/w melatonin 3 mg qhs prn  - sleeping well and more alert during the day     -Pain control: Tylenol prn    -GI/Bowel Mgmt: senna/Miralax    - Diet: Easy to chew with thins DASH/carb consistent           Precautions / PROPHYLAXIS:

## 2025-02-09 NOTE — PROGRESS NOTE ADULT - ASSESSMENT
74 M PMH of Parkinson's, HTN, hx ischemic CVA x 2, type 2 DM, dementia and chronic back pain, Hx of seizures on Keppra BIBA from [place] facility  or generalized weakness, confusion, decreased PO intake and multiple witnessed falls over the past few days. He had two witnessed falls the day prior to admission where he was weak and slumped to the ground. No LOC, not on AC. Per staff, he is normally alert and oriented x 3 and able to perform ADLs, but for the past few days has been increasingly confused. He was evaluated in the ED the day prior for similar complaints, had a negative work up and was discharged. Infectious and trauma workup negative. On 1/21 patient was notes to have expressive aphasia and R facial droop. Evaluated by neurology, MRI ordered and showed acute stroke in the L corona radiata. CTA Head/Neck showed severe stenosis left MCA proximal M2. Moderate narrowing right proximal vertebral artery On 1/22 new onset L tongue deviation and mild dysarthria - repeat MRI brain with no new acute findings. ILR placed 1/24. NIHSS 4 on admission.    #Rehab for gait abnormality, dysarthria and expressive aphasia iso acute L corona radiata stroke  -Imaging as above  -s/p ILR 1/24  -s/p  EEG 1/22: generalized slowing, no seizures  - A1C 4.9, , TSH 2.81  -c/w ASA and Plavix for 90 days  -c/w Lipitor 80 mg qhs  -PT/OT/SLP/Neuropsych eval and treat   - mobility and ADLs and cognition improving    #mechanical fall 2/8  - VSS and PE unremarkable  - f/u [facility]TH; pending official read    #HTN  -c/w amlodipine 5 mg daily  -c/w Coreg 3.125 mg q12h  -Monitor BP, stable in good range     #HLD  -c/w Lipitor 80 mg qhs    #DM2  - A1C with Estimated Average Glucose Result: 4.9: Method: Immunoassay - well controlled on no meds at this time.  - on low dose gabapentin at night. No c/o of pain or dysesthesias. d/c and monitor.    #Hx of seizures  -c/w Keppra 500 bid    #Hx of Parkinson’s Syndrome  #Hx of dementia. Mild and does not interfere with patient's ability to participate in and benefit from acute rehab.  -c/w home meds sinemet, entacapone, and rivastigmine     #Anxiety/depression  #Insomnia  -c/w mirtazapine 7.5 daily  -was on Trazadone 100 mg qhs - given increased confusion, decreased to 50 mg hs   -c/w melatonin 3 mg qhs prn  - sleeping well and more alert during the day     -Pain control: Tylenol prn    -GI/Bowel Mgmt: senna/Miralax    - Diet: Easy to chew with thins DASH/carb consistent           Precautions / PROPHYLAXIS:      - Falls      - DVT prophylaxis: Lovenox     74 M PMH of Parkinson's, HTN, hx ischemic CVA x 2, type 2 DM, dementia and chronic back pain, Hx of seizures on Keppra BIBA from [place] facility  or generalized weakness, confusion, decreased PO intake and multiple witnessed falls over the past few days. He had two witnessed falls the day prior to admission where he was weak and slumped to the ground. No LOC, not on AC. Per staff, he is normally alert and oriented x 3 and able to perform ADLs, but for the past few days has been increasingly confused. He was evaluated in the ED the day prior for similar complaints, had a negative work up and was discharged. Infectious and trauma workup negative. On 1/21 patient was notes to have expressive aphasia and R facial droop. Evaluated by neurology, MRI ordered and showed acute stroke in the L corona radiata. CTA Head/Neck showed severe stenosis left MCA proximal M2. Moderate narrowing right proximal vertebral artery On 1/22 new onset L tongue deviation and mild dysarthria - repeat MRI brain with no new acute findings. ILR placed 1/24. NIHSS 4 on admission.    #Rehab for gait abnormality, dysarthria and expressive aphasia iso acute L corona radiata stroke  -Imaging as above  -s/p ILR 1/24  -s/p  EEG 1/22: generalized slowing, no seizures  - A1C 4.9, , TSH 2.81  -c/w ASA and Plavix for 90 days  -c/w Lipitor 80 mg qhs  -PT/OT/SLP/Neuropsych eval and treat   - mobility and ADLs and cognition improving    #mechanical fall 2/8  - VSS and PE unremarkable  - f/u [facility]; pending official read    #HTN  -c/w amlodipine 5 mg daily  -c/w Coreg 3.125 mg q12h  -Monitor BP, stable in good range     #HLD  -c/w Lipitor 80 mg qhs    #DM2  - A1C with Estimated Average Glucose Result: 4.9: Method: Immunoassay - well controlled on no meds at this time.  - on low dose gabapentin at night. No c/o of pain or dysesthesias. d/c and monitor.    #Hx of seizures  -c/w Keppra 500 bid    #Hx of Parkinson’s Syndrome  #Hx of dementia. Mild and does not interfere with patient's ability to participate in and benefit from acute rehab.  -c/w home meds sinemet, entacapone, and rivastigmine     #Anxiety/depression  #Insomnia  -c/w mirtazapine 7.5 daily  -was on Trazadone 100 mg qhs - given increased confusion, decreased to 50 mg hs   -c/w melatonin 3 mg qhs prn  - sleeping well and more alert during the day     -Pain control: Tylenol prn    -GI/Bowel Mgmt: senna/Miralax    - Diet: Easy to chew with thins DASH/carb consistent           Precautions / PROPHYLAXIS:      - Falls      - DVT prophylaxis: Lovenox

## 2025-02-10 ENCOUNTER — TRANSCRIPTION ENCOUNTER (OUTPATIENT)
Age: 75
End: 2025-02-10

## 2025-02-10 LAB
ALBUMIN SERPL ELPH-MCNC: 3.3 G/DL — LOW (ref 3.5–5.2)
ALP SERPL-CCNC: 87 U/L — SIGNIFICANT CHANGE UP (ref 30–115)
ALT FLD-CCNC: 9 U/L — SIGNIFICANT CHANGE UP (ref 0–41)
ANION GAP SERPL CALC-SCNC: 7 MMOL/L — SIGNIFICANT CHANGE UP (ref 7–14)
AST SERPL-CCNC: 34 U/L — SIGNIFICANT CHANGE UP (ref 0–41)
BASOPHILS # BLD AUTO: 0.03 K/UL — SIGNIFICANT CHANGE UP (ref 0–0.2)
BASOPHILS NFR BLD AUTO: 0.4 % — SIGNIFICANT CHANGE UP (ref 0–1)
BILIRUB SERPL-MCNC: 0.5 MG/DL — SIGNIFICANT CHANGE UP (ref 0.2–1.2)
BUN SERPL-MCNC: 18 MG/DL — SIGNIFICANT CHANGE UP (ref 10–20)
CALCIUM SERPL-MCNC: 8.7 MG/DL — SIGNIFICANT CHANGE UP (ref 8.4–10.5)
CHLORIDE SERPL-SCNC: 108 MMOL/L — SIGNIFICANT CHANGE UP (ref 98–110)
CO2 SERPL-SCNC: 25 MMOL/L — SIGNIFICANT CHANGE UP (ref 17–32)
CREAT SERPL-MCNC: 0.9 MG/DL — SIGNIFICANT CHANGE UP (ref 0.7–1.5)
EGFR: 91 ML/MIN/1.73M2 — SIGNIFICANT CHANGE UP
EOSINOPHIL # BLD AUTO: 0.19 K/UL — SIGNIFICANT CHANGE UP (ref 0–0.7)
EOSINOPHIL NFR BLD AUTO: 2.3 % — SIGNIFICANT CHANGE UP (ref 0–8)
GLUCOSE BLDC GLUCOMTR-MCNC: 112 MG/DL — HIGH (ref 70–99)
GLUCOSE BLDC GLUCOMTR-MCNC: 128 MG/DL — HIGH (ref 70–99)
GLUCOSE BLDC GLUCOMTR-MCNC: 148 MG/DL — HIGH (ref 70–99)
GLUCOSE BLDC GLUCOMTR-MCNC: 173 MG/DL — HIGH (ref 70–99)
GLUCOSE SERPL-MCNC: 85 MG/DL — SIGNIFICANT CHANGE UP (ref 70–99)
HCT VFR BLD CALC: 39.7 % — LOW (ref 42–52)
HGB BLD-MCNC: 13.5 G/DL — LOW (ref 14–18)
IMM GRANULOCYTES NFR BLD AUTO: 0.2 % — SIGNIFICANT CHANGE UP (ref 0.1–0.3)
LYMPHOCYTES # BLD AUTO: 2.38 K/UL — SIGNIFICANT CHANGE UP (ref 1.2–3.4)
LYMPHOCYTES # BLD AUTO: 29 % — SIGNIFICANT CHANGE UP (ref 20.5–51.1)
MAGNESIUM SERPL-MCNC: 1.8 MG/DL — SIGNIFICANT CHANGE UP (ref 1.8–2.4)
MCHC RBC-ENTMCNC: 33.3 PG — HIGH (ref 27–31)
MCHC RBC-ENTMCNC: 34 G/DL — SIGNIFICANT CHANGE UP (ref 32–37)
MCV RBC AUTO: 97.8 FL — HIGH (ref 80–94)
MONOCYTES # BLD AUTO: 0.66 K/UL — HIGH (ref 0.1–0.6)
MONOCYTES NFR BLD AUTO: 8 % — SIGNIFICANT CHANGE UP (ref 1.7–9.3)
NEUTROPHILS # BLD AUTO: 4.93 K/UL — SIGNIFICANT CHANGE UP (ref 1.4–6.5)
NEUTROPHILS NFR BLD AUTO: 60.1 % — SIGNIFICANT CHANGE UP (ref 42.2–75.2)
NRBC # BLD: 0 /100 WBCS — SIGNIFICANT CHANGE UP (ref 0–0)
NRBC BLD-RTO: 0 /100 WBCS — SIGNIFICANT CHANGE UP (ref 0–0)
PLATELET # BLD AUTO: 148 K/UL — SIGNIFICANT CHANGE UP (ref 130–400)
PMV BLD: 9.8 FL — SIGNIFICANT CHANGE UP (ref 7.4–10.4)
POTASSIUM SERPL-MCNC: 4.6 MMOL/L — SIGNIFICANT CHANGE UP (ref 3.5–5)
POTASSIUM SERPL-SCNC: 4.6 MMOL/L — SIGNIFICANT CHANGE UP (ref 3.5–5)
PROT SERPL-MCNC: 5.3 G/DL — LOW (ref 6–8)
RBC # BLD: 4.06 M/UL — LOW (ref 4.7–6.1)
RBC # FLD: 11.7 % — SIGNIFICANT CHANGE UP (ref 11.5–14.5)
SODIUM SERPL-SCNC: 140 MMOL/L — SIGNIFICANT CHANGE UP (ref 135–146)
WBC # BLD: 8.21 K/UL — SIGNIFICANT CHANGE UP (ref 4.8–10.8)
WBC # FLD AUTO: 8.21 K/UL — SIGNIFICANT CHANGE UP (ref 4.8–10.8)

## 2025-02-10 RX ORDER — MIRTAZAPINE 30 MG/1
7.5 TABLET, FILM COATED ORAL AT BEDTIME
Refills: 0 | Status: DISCONTINUED | OUTPATIENT
Start: 2025-02-11 | End: 2025-02-12

## 2025-02-10 RX ADMIN — LEVETIRACETAM 500 MILLIGRAM(S): 750 TABLET, FILM COATED ORAL at 18:02

## 2025-02-10 RX ADMIN — DEXTROMETHORPHAN HBR AND GUAIFENESIN ORAL SOLUTION 10 MILLILITER(S): 10; 100 LIQUID ORAL at 23:02

## 2025-02-10 RX ADMIN — ENOXAPARIN SODIUM 40 MILLIGRAM(S): 100 INJECTION SUBCUTANEOUS at 06:14

## 2025-02-10 RX ADMIN — ATORVASTATIN CALCIUM 80 MILLIGRAM(S): 80 TABLET, FILM COATED ORAL at 21:29

## 2025-02-10 RX ADMIN — RIVASTIGMINE 1 PATCH: 4.6 PATCH, EXTENDED RELEASE TRANSDERMAL at 06:14

## 2025-02-10 RX ADMIN — Medication 2 TABLET(S): at 13:08

## 2025-02-10 RX ADMIN — Medication 2 TABLET(S): at 06:14

## 2025-02-10 RX ADMIN — DEXTROMETHORPHAN HBR AND GUAIFENESIN ORAL SOLUTION 10 MILLILITER(S): 10; 100 LIQUID ORAL at 06:15

## 2025-02-10 RX ADMIN — Medication 3.12 MILLIGRAM(S): at 18:03

## 2025-02-10 RX ADMIN — Medication 3.12 MILLIGRAM(S): at 06:19

## 2025-02-10 RX ADMIN — Medication 1 TABLET(S): at 18:02

## 2025-02-10 RX ADMIN — Medication 2 TABLET(S): at 21:29

## 2025-02-10 RX ADMIN — ENTACAPONE 200 MILLIGRAM(S): 200 TABLET, FILM COATED ORAL at 21:29

## 2025-02-10 RX ADMIN — POLYETHYLENE GLYCOL 3350 17 GRAM(S): 17 POWDER, FOR SOLUTION ORAL at 12:25

## 2025-02-10 RX ADMIN — DEXTROMETHORPHAN HBR AND GUAIFENESIN ORAL SOLUTION 10 MILLILITER(S): 10; 100 LIQUID ORAL at 18:02

## 2025-02-10 RX ADMIN — Medication 50 MILLIGRAM(S): at 21:29

## 2025-02-10 RX ADMIN — ASPIRIN 81 MILLIGRAM(S): 81 TABLET, COATED ORAL at 12:24

## 2025-02-10 RX ADMIN — DEXTROMETHORPHAN HBR AND GUAIFENESIN ORAL SOLUTION 10 MILLILITER(S): 10; 100 LIQUID ORAL at 12:25

## 2025-02-10 RX ADMIN — Medication 75 MILLIGRAM(S): at 12:25

## 2025-02-10 RX ADMIN — LEVETIRACETAM 500 MILLIGRAM(S): 750 TABLET, FILM COATED ORAL at 06:14

## 2025-02-10 RX ADMIN — ENTACAPONE 200 MILLIGRAM(S): 200 TABLET, FILM COATED ORAL at 06:14

## 2025-02-10 RX ADMIN — ANTISEPTIC SURGICAL SCRUB 1 APPLICATION(S): 0.04 SOLUTION TOPICAL at 06:19

## 2025-02-10 RX ADMIN — ENTACAPONE 200 MILLIGRAM(S): 200 TABLET, FILM COATED ORAL at 13:09

## 2025-02-10 RX ADMIN — MIRTAZAPINE 7.5 MILLIGRAM(S): 30 TABLET, FILM COATED ORAL at 12:25

## 2025-02-10 NOTE — PROGRESS NOTE ADULT - ASSESSMENT
74 M PMH of Parkinson's, HTN, hx ischemic CVA x 2, type 2 DM, dementia and chronic back pain, Hx of seizures on Keppra BIBA from [place] facility  or generalized weakness, confusion, decreased PO intake and multiple witnessed falls over the past few days. He had two witnessed falls the day prior to admission where he was weak and slumped to the ground. No LOC, not on AC. Per staff, he is normally alert and oriented x 3 and able to perform ADLs, but for the past few days has been increasingly confused. He was evaluated in the ED the day prior for similar complaints, had a negative work up and was discharged. Infectious and trauma workup negative. On 1/21 patient was notes to have expressive aphasia and R facial droop. Evaluated by neurology, MRI ordered and showed acute stroke in the L corona radiata. CTA Head/Neck showed severe stenosis left MCA proximal M2. Moderate narrowing right proximal vertebral artery On 1/22 new onset L tongue deviation and mild dysarthria - repeat MRI brain with no new acute findings. ILR placed 1/24. NIHSS 4 on admission.    #Rehab for gait abnormality, dysarthria and expressive aphasia iso acute L corona radiata stroke  -Imaging as above  -s/p ILR 1/24  -s/p  EEG 1/22: generalized slowing, no seizures  - A1C 4.9, , TSH 2.81  -c/w ASA and Plavix for 90 days  -c/w Lipitor 80 mg qhs  -PT/OT/SLP/Neuropsych eval and treat   - mobility and ADLs and cognition improving    #mechanical fall 2/8  - VSS and PE unremarkable  - f/u [facility]; pending official read    #HTN  -c/w amlodipine 5 mg daily  -c/w Coreg 3.125 mg q12h  -Monitor BP, stable in good range     #HLD  -c/w Lipitor 80 mg qhs    #DM2  - A1C with Estimated Average Glucose Result: 4.9: Method: Immunoassay - well controlled on no meds at this time.  - on low dose gabapentin at night. No c/o of pain or dysesthesias. d/c and monitor.    #Hx of seizures  -c/w Keppra 500 bid    #Hx of Parkinson’s Syndrome  #Hx of dementia. Mild and does not interfere with patient's ability to participate in and benefit from acute rehab.  -c/w home meds sinemet, entacapone, and rivastigmine     #Anxiety/depression  #Insomnia  -c/w mirtazapine 7.5 daily  -was on Trazadone 100 mg qhs - given increased confusion, decreased to 50 mg hs   -c/w melatonin 3 mg qhs prn  - sleeping well and more alert during the day    #Anemia  -H/H decreased  -No active bleeding indications   -Repeat CBC and monitor     -Pain control: Tylenol prn    -GI/Bowel Mgmt: senna/Miralax    - Diet: Easy to chew with thins DASH/carb consistent           Precautions / PROPHYLAXIS:      - Falls      - DVT prophylaxis: Lovenox     74 M PMH of Parkinson's, HTN, hx ischemic CVA x 2, type 2 DM, dementia and chronic back pain, Hx of seizures on Keppra BIBA from [place] facility  or generalized weakness, confusion, decreased PO intake and multiple witnessed falls over the past few days. He had two witnessed falls the day prior to admission where he was weak and slumped to the ground. No LOC, not on AC. Per staff, he is normally alert and oriented x 3 and able to perform ADLs, but for the past few days has been increasingly confused. He was evaluated in the ED the day prior for similar complaints, had a negative work up and was discharged. Infectious and trauma workup negative. On 1/21 patient was notes to have expressive aphasia and R facial droop. Evaluated by neurology, MRI ordered and showed acute stroke in the L corona radiata. CTA Head/Neck showed severe stenosis left MCA proximal M2. Moderate narrowing right proximal vertebral artery On 1/22 new onset L tongue deviation and mild dysarthria - repeat MRI brain with no new acute findings. ILR placed 1/24. NIHSS 4 on admission.    #Rehab for gait abnormality, dysarthria and expressive aphasia iso acute L corona radiata stroke  -Imaging as above  -s/p ILR 1/24  -s/p  EEG 1/22: generalized slowing, no seizures  - A1C 4.9, , TSH 2.81  -c/w ASA and Plavix for 90 days  -c/w Lipitor 80 mg qhs  -PT/OT/SLP/Neuropsych eval and treat   - mobility and ADLs and cognition improving    #mechanical fall 2/8  - VSS and PE unremarkable  - f/u [facility]; pending official read    #HTN  -c/w amlodipine 5 mg daily  -c/w Coreg 3.125 mg q12h  -Monitor BP, stable in good range     #HLD  -c/w Lipitor 80 mg qhs    #DM2  - A1C with Estimated Average Glucose Result: 4.9: Method: Immunoassay - well controlled on no meds at this time.  - on low dose gabapentin at night. No c/o of pain or dysesthesias. d/c and monitor.    #Hx of seizures  -c/w Keppra 500 bid    #Hx of Parkinson’s Syndrome  #Hx of dementia. Mild and does not interfere with patient's ability to participate in and benefit from acute rehab.  -c/w home meds sinemet, entacapone, and rivastigmine     #Anxiety/depression  #Insomnia  -c/w mirtazapine 7.5 adjusted to daily at bedtime on 2/11 to aid sleep  -was on Trazadone 100 mg qhs - given increased confusion, decreased to 50 mg hs   -c/w melatonin 3 mg qhs prn  - sleeping well and more alert during the day    #Anemia  -H/H decreased  -No active bleeding indications   -Repeat CBC and monitor     -Pain control: Tylenol prn    -GI/Bowel Mgmt: senna/Miralax    - Diet: Easy to chew with thins DASH/carb consistent           Precautions / PROPHYLAXIS:      - Falls      - DVT prophylaxis: Lovenox

## 2025-02-10 NOTE — DISCHARGE NOTE PROVIDER - CARE PROVIDERS DIRECT ADDRESSES
[email or web address]

## 2025-02-10 NOTE — DISCHARGE NOTE PROVIDER - NSDCCPCAREPLAN_GEN_ALL_CORE_FT
PRINCIPAL DISCHARGE DIAGNOSIS  Diagnosis: Ischemic stroke  Assessment and Plan of Treatment: You were started onASa 81 mg ,PlAVIX and lipitor 80 mg   ,asa and plavix to continue for 90 days as per Neurologist that is until 4/21   then juan rneurologist , Hx of parkinsons , not any particular medication .      SECONDARY DISCHARGE DIAGNOSES  Diagnosis: History of seizures  Assessment and Plan of Treatment: On keppra to be continued  ,and follow up with neurologist as needed    Diagnosis: Anxiety and depression  Assessment and Plan of Treatment: And Difficulty Sleeping , on Mirtazapine 7.5 mg , he was on trazodone 100 mg decreased  due to confusion to 50 mg hs and on melatonin as needed    Diagnosis: DM (diabetes mellitus)  Assessment and Plan of Treatment: You had HX of DM ,now A1is 4.9 , not on any treatment due to well controlled blood sugars and A1C LEVEL , MAY CONTINUE CARB CONSISTENT DIET . WITH dash OR LOW SODIUM HEART HEALTHY    Diagnosis: H/O neuropathy  Assessment and Plan of Treatment: and hypertension , blood pressures controlled , on gabapentin 200 mg at bed time /,     PRINCIPAL DISCHARGE DIAGNOSIS  Diagnosis: Ischemic stroke  Assessment and Plan of Treatment: You were started onASa 81 mg ,PlAVIX and lipitor 80 mg   ,asa and plavix to continue for 90 days as per Neurologist that is until 4/16   then as per neurologist . continue medications for Parkinsons disease  as instructed , need  to follow up with movement  disorder specialist or  h neurologist. , Need  to continue OT.PT and   .      SECONDARY DISCHARGE DIAGNOSES  Diagnosis: History of seizures  Assessment and Plan of Treatment: On keppra to be continued  ,and follow up with neurologist as needed    Diagnosis: Anxiety and depression  Assessment and Plan of Treatment: And Difficulty Sleeping , on Mirtazapine 7.5 mg , he was on trazodone 100 mg decreased  due to confusion to 50 mg hs and on melatonin as needed    Diagnosis: DM (diabetes mellitus)  Assessment and Plan of Treatment: You had HX of DM ,now A1is 4.9 , not on any treatment due to well controlled blood sugars and A1C LEVEL , MAY CONTINUE CARB CONSISTENT DIET . WITH dash OR LOW SODIUM HEART HEALTHY    Diagnosis: H/O neuropathy  Assessment and Plan of Treatment: and hypertension , blood pressures controlled , on gabapentin 200 mg at bed time /,     PRINCIPAL DISCHARGE DIAGNOSIS  Diagnosis: Ischemic stroke  Assessment and Plan of Treatment: You were started onASa 81 mg ,PlAVIX and lipitor 80 mg   ,asa and plavix to continue for 90 days as per Neurologist that is until 4/16   then as per neurologist . continue medications for Parkinsons disease  as instructed , need  to follow up with movement  disorder specialist or  h neurologist. , Need  to continue OT.PT and   .      SECONDARY DISCHARGE DIAGNOSES  Diagnosis: History of seizures  Assessment and Plan of Treatment: On keppra to be continued  ,and follow up with neurologist as needed , monitor for seizures and any neuro  changes  as per your facilty . Get immediate medical help if it occurs.    Diagnosis: Anxiety and depression  Assessment and Plan of Treatment: And Difficulty Sleeping , on Mirtazapine 7.5 mg , he was on trazodone 100 mg decreased  due to confusion to 50 mg hs and on melatonin as needed    Diagnosis: DM (diabetes mellitus)  Assessment and Plan of Treatment: You had HX of DM ,now A1is 4.9 , not on any treatment due to well controlled blood sugars and A1C LEVEL , MAY CONTINUE CARB CONSISTENT DIET . WITH dash OR LOW SODIUM HEART HEALTHY    Diagnosis: H/O neuropathy  Assessment and Plan of Treatment: and Hypertension , blood pressures controlled  on lower doses of amlodipin and coreg 3125 mg q12h , ,  Gabapentin stopped for now , it is not needed.

## 2025-02-10 NOTE — DISCHARGE NOTE PROVIDER - NSDCQMSTROKERISK_NEU_ALL_CORE
Diabetes/History of a stroke or TIA Diabetes/High blood pressure/High cholesterol/History of a stroke or TIA

## 2025-02-10 NOTE — DISCHARGE NOTE PROVIDER - NSDCMRMEDTOKEN_GEN_ALL_CORE_FT
amLODIPine 5 mg oral tablet: 1 tab(s) orally once a day  aspirin 81 mg oral tablet, chewable: 1 tab(s) orally once a day  atorvastatin 80 mg oral tablet: 1 tab(s) orally once a day (at bedtime)  carbidopa-levodopa 50 mg-200 mg oral tablet, extended release: 1 tab(s) orally once a day (at bedtime)  clopidogrel 75 mg oral tablet: 1 tab(s) orally once a day FROM 1/25-4/22  Coreg 3.125 mg oral tablet: 1 tab(s) orally every 12 hours  entacapone 200 mg oral tablet: 1 tab(s) orally 3 times a day at 7am, 12pm, and 5pm with Sinemet  gabapentin 100 mg oral capsule: 1 cap(s) orally once a day (at bedtime)  levETIRAcetam 500 mg oral tablet: 1 tab(s) orally 2 times a day  Melatonin 3 mg oral tablet: 1 tab(s) orally once a day (at bedtime)  mirtazapine 7.5 mg oral tablet: 2 tab(s) orally once a day (at bedtime)  polyethylene glycol 3350 oral powder for reconstitution: 17 gram(s) orally 3 times a week  rivastigmine 9.5 mg/24 hr transdermal film, extended release: 1 patch transdermal every 24 hours  senna leaf extract oral tablet: 2 tab(s) orally once a day (at bedtime)  traZODone 100 mg oral tablet: orally once a day (at bedtime)   amLODIPine 5 mg oral tablet: 1 tab(s) orally once a day  amLODIPine 5 mg oral tablet: 1 tab(s) orally once a day  aspirin 81 mg oral tablet, chewable: 1 tab(s) orally once a day  aspirin 81 mg oral tablet, chewable: 1 tab(s) orally once a day  atorvastatin 80 mg oral tablet: 1 tab(s) orally once a day (at bedtime)  carbidopa-levodopa 50 mg-200 mg oral tablet, extended release: 1 tab(s) orally once a day (at bedtime)  clopidogrel 75 mg oral tablet: 1 tab(s) orally once a day FROM 1/25-4/22  clopidogrel 75 mg oral tablet: 1 tab(s) orally once a day  Coreg 3.125 mg oral tablet: 1 tab(s) orally every 12 hours  entacapone 200 mg oral tablet: 1 tab(s) orally 3 times a day at 7am, 12pm, and 5pm with Sinemet  gabapentin 100 mg oral capsule: 1 cap(s) orally once a day (at bedtime)  guaifenesin-dextromethorphan 100 mg-10 mg/5 mL oral liquid: 10 milliliter(s) orally every 6 hours  levETIRAcetam 500 mg oral tablet: 1 tab(s) orally 2 times a day  Melatonin 3 mg oral tablet: 1 tab(s) orally once a day (at bedtime)  mirtazapine 7.5 mg oral tablet: 2 tab(s) orally once a day (at bedtime)  polyethylene glycol 3350 oral powder for reconstitution: 17 gram(s) orally 3 times a week  polyethylene glycol 3350 oral powder for reconstitution: 17 gram(s) orally once a day  rivastigmine 9.5 mg/24 hr transdermal film, extended release: 1 patch transdermal every 24 hours  senna leaf extract oral tablet: 2 tab(s) orally once a day (at bedtime)  traZODone 100 mg oral tablet: orally once a day (at bedtime)  traZODone 50 mg oral tablet: 1 tab(s) orally once a day (at bedtime)   acetaminophen 325 mg oral tablet: 2 tab(s) orally every 6 hours as needed for Temp greater or equal to 38C (100.4F), Mild Pain (1 - 3)  amLODIPine 5 mg oral tablet: 1 tab(s) orally once a day  aspirin 81 mg oral tablet, chewable: 1 tab(s) orally once a day  atorvastatin 80 mg oral tablet: 1 tab(s) orally once a day (at bedtime)  carbidopa-levodopa 50 mg-200 mg oral tablet, extended release: 1 tab(s) orally once a day (at bedtime)  clopidogrel 75 mg oral tablet: 1 tab(s) orally once a day  Coreg 3.125 mg oral tablet: 1 tab(s) orally every 12 hours  entacapone 200 mg oral tablet: 1 tab(s) orally 3 times a day at 7am, 12pm, and 5pm with Sinemet  guaifenesin-dextromethorphan 100 mg-10 mg/5 mL oral liquid: 10 milliliter(s) orally every 6 hours  levETIRAcetam 500 mg oral tablet: 1 tab(s) orally 2 times a day MDD: 2  Melatonin 3 mg oral tablet: 1 tab(s) orally once a day (at bedtime)  mirtazapine 7.5 mg oral tablet: 1 tab(s) orally once a day (at bedtime) MDD: 1  polyethylene glycol 3350 oral powder for reconstitution: 17 gram(s) orally once a day  rivastigmine 9.5 mg/24 hr transdermal film, extended release: 1 patch transdermally once a day  senna leaf extract oral tablet: 2 tab(s) orally once a day (at bedtime)  traZODone 50 mg oral tablet: 1 tab(s) orally once a day (at bedtime)

## 2025-02-10 NOTE — DISCHARGE NOTE PROVIDER - HOSPITAL COURSE
Patient is a 74 y old male who presents with a chief complaint of acute stroke and Parkinson's Syndrome with decline in function (24 Jan 2025 13:28)      HPI:    74 M PMH of Parkinson's, HTN, hx ischemic CVA x2, type 2 DM, dementia and chronic back pain, Hx of seizures on keppra BIBA from [place] facility  or generalized weakness, confusion, decreased PO intake and multiple witnessed falls over the past few days. He had two witnessed falls the day prior to admission where he was weak and slumped to the ground. No LOC, not on AC. Per staff, he is normally alert and oriented x 3 and able to perform ADLs, but for the past few days has been increasingly confused. He was evaluated in the ED the day prior for similar complaints, had a negative work up and was discharged. Infectious and trauma workup negative. On 1/21 patient was notes to have expressive aphasia and R facial droop. Evaluated by neurology, MRI ordered and showed acute stroke in the L corona radiata. CTA Head/Neck showed severe stenosis left MCA proximal M2. Moderate narrowing right proximal vertebral artery On 1/22 new onset L tongue deviation and mild dysarthria - repeat MRI brain with no new acute findings. ILR placed 1/24.  Prior to admission, the patient was independent in ADLs and ambulation without an assistive device Patient now requires Reza for bed mobility, modA for transfers, ambulating 40’x2 with RW and modA,  modA for LBD.Evaluated by Physiatry and deemed to be an excellent candidate for admission to  for acute inpatient rehab. Admitted to Acute Rehab on 1/24/25CLOF: Bed mobility supervision; Transfers sup/touchA-partialA; ambulation 150 ft x2/RW/touchA; UBD partialA; LBD partialA#Rehab for gait abnormality, dysarthria and expressive aphasia iso acute L corona radiata stroke  -Imaging as above  -s/p ILR 1/24  -s/p  EEG 1/22: generalized slowing, no seizures  - A1C 4.9, , TSH 2.81  -c/w ASA and Plavix for 90 days  -c/w Lipitor 80 mg qhs  -PT/OT/SLP/Neuropsych eval and treat   - mobility and ADLs and cognition improving    #mechanical fall 2/8  - VSS and PE unremarkable  - f/u CTH; pending official read    #HTN  -c/w amlodipine 5 mg daily  -c/w Coreg 3.125 mg q12h  -Monitor BP, stable in good range     #HLD  -c/w Lipitor 80 mg qhs    #DM2  - A1C with Estimated Average Glucose Result: 4.9: Method: Immunoassay - well controlled on no meds at this time.  - on low dose gabapentin at night. No c/o of pain or dysesthesias. d/c and monitor.    #Hx of seizures  -c/w Keppra 500 bid    #Hx of Parkinson’s Syndrome  #Hx of dementia. Mild and does not interfere with patient's ability to participate in and benefit from acute rehab.  -c/w home meds sinemet, entacapone, and rivastigmine     #Anxiety/depression  #Insomnia  -c/w mirtazapine 7.5 adjusted to daily at bedtime on 2/11 to aid sleep  -was on Trazadone 100 mg qhs - given increased confusion, decreased to 50 mg hs   -c/w melatonin 3 mg qhs prn  - sleeping well and more alert during the day    #Anemia  -H/H decreased  -No active bleeding indications   -Repeat CBC and monitor     -Pain control: Tylenol prn    -GI/Bowel Mgmt: senna/Miralax    - Diet: Easy to chew with thins DASH/carb consistent        Patient is a 74 y old male who presents with a chief complaint of acute stroke and Parkinson's Syndrome with decline in function (24 Jan 2025 13:28)      HPI:    74 M PMH of Parkinson's, HTN, hx ischemic CVA x2, type 2 DM, dementia and chronic back pain, Hx of seizures on keppra BIBA from [place] facility  or generalized weakness, confusion, decreased PO intake and multiple witnessed falls over the past few days. He had two witnessed falls the day prior to admission where he was weak and slumped to the ground. No LOC, not on AC. Per staff, he is normally alert and oriented x 3 and able to perform ADLs, but for the past few days has been increasingly confused. He was evaluated in the ED the day prior for similar complaints, had a negative work up and was discharged. Infectious and trauma workup negative. On 1/21 patient was notes to have expressive aphasia and R facial droop. Evaluated by neurology, MRI ordered and showed acute stroke in the L corona radiata. CTA Head/Neck showed severe stenosis left MCA proximal M2. Moderate narrowing right proximal vertebral artery On 1/22 new onset L tongue deviation and mild dysarthria - repeat MRI brain with no new acute findings. ILR placed 1/24.  Prior to admission, the patient was independent in ADLs and ambulation without an assistive device Patient now requires Reza for bed mobility, modA for transfers, ambulating 40’x2 with RW and modA,  modA for LBD.Evaluated by Physiatry and deemed to be an excellent candidate for admission to  for acute inpatient rehab. Admitted to Acute Rehab on 1/24/25CLOF: Bed mobility supervision; Transfers sup/touchA-partialA; ambulation 150 ft x2/RW/touchA; UBD partialA; LBD partialA#Rehab for gait abnormality, dysarthria and expressive aphasia iso acute L corona radiata stroke  -Imaging as above  -s/p ILR 1/24  -s/p  EEG 1/22: generalized slowing, no seizures  - A1C 4.9, , TSH 2.81  -c/w ASA and Plavix for 90 days  -c/w Lipitor 80 mg qhs  -PT/OT/SLP/Neuropsych eval and treat   - mobility and ADLs and cognition improving    #mechanical fall 2/8  - VSS and PE unremarkable, no change in  imaging .       #HTN  -c/w amlodipine 5 mg daily  -c/w Coreg 3.125 mg q12h  -Monitor BP, stable in good range while on lowered  dose      #HLD  -c/w Lipitor 80 mg qhs    #DM2  - A1C with Estimated Average Glucose Result: 4.9: Method: Immunoassay - well controlled on no meds at this time.  - on low dose gabapentin at night. No c/o of pain or dysesthesias. d/c and monitor.    #Hx of seizures  -c/w Keppra 500 bid    #Hx of Parkinson’s Syndrome  #Hx of dementia. Mild and does not interfere with patient's ability to participate in and benefit from acute rehab.  -c/w home meds sinemet, entacapone, and rivastigmine     #Anxiety/depression  #Insomnia  -c/w mirtazapine 7.5 adjusted to daily at bedtime on 2/11 to aid sleep  -was on Trazadone 100 mg qhs - given increased confusion, decreased to 50 mg hs   -c/w melatonin 3 mg qhs prn  - sleeping well and more alert during the day    #Anemia  -H/H decreased  -No active bleeding indications   -Repeat CBC and monitor     -Pain control: Tylenol prn    -GI/Bowel Mgmt: senna/Miralax    - Diet: Easy to chew with thins DASH/carb consistent    He is approved  to be discharged to the facility he lives([facility] ) with home health aide and PT and OT  facility . all his medications sent to [facility] pharmacy . he will need  to follow up with neurology , EP cardiologist  and PMD in 2  to 4 weeks .

## 2025-02-10 NOTE — DISCHARGE NOTE PROVIDER - NSDCFUSCHEDAPPT_GEN_ALL_CORE_FT
Jeana Tolliver  [facility]  ELECTROPH [address]  Scheduled Appointment: 02/19/2025    [facility]  CARDIOLOGY [address]  Scheduled Appointment: 03/17/2025    [facility]  CARDIOLOGY [address]  Scheduled Appointment: 03/20/2025

## 2025-02-10 NOTE — PROGRESS NOTE ADULT - ATTENDING COMMENTS
Patient seen and examined with the resident. We discussed the case. I have directed the care. I edited the note. The patient requires acute rehab with 3 hours of daily therapies at least 5 out of 7 days and close physiatry follow up. It would be better to dose Remeron at bedtime to aid sleep.  #Rehab for gait abnormality, dysarthria and expressive aphasia iso acute L corona radiata stroke  -Imaging as above  -s/p ILR 1/24  -s/p  EEG 1/22: generalized slowing, no seizures  - A1C 4.9, , TSH 2.81  -c/w ASA and Plavix for 90 days  -c/w Lipitor 80 mg qhs  -PT/OT/SLP/Neuropsych eval and treat   - mobility and ADLs and cognition improving    #mechanical fall 2/8  - VSS and PE unremarkable  - f/u CTH; pending official read    #HTN  -c/w amlodipine 5 mg daily  -c/w Coreg 3.125 mg q12h  -Monitor BP, stable in good range     #HLD  -c/w Lipitor 80 mg qhs    #DM2  - A1C with Estimated Average Glucose Result: 4.9: Method: Immunoassay - well controlled on no meds at this time.  - on low dose gabapentin at night. No c/o of pain or dysesthesias. d/c and monitor.    #Hx of seizures  -c/w Keppra 500 bid    #Hx of Parkinson’s Syndrome  #Hx of dementia. Mild and does not interfere with patient's ability to participate in and benefit from acute rehab.  -c/w home meds sinemet, entacapone, and rivastigmine     #Anxiety/depression  #Insomnia  -c/w mirtazapine 7.5 daily  -was on Trazadone 100 mg qhs - given increased confusion, decreased to 50 mg hs   -c/w melatonin 3 mg qhs prn  - sleeping well and more alert during the day    #Anemia  -H/H decreased  -No active bleeding indications   -Repeat CBC and monitor     -Pain control: Tylenol prn    -GI/Bowel Mgmt: senna/Miralax    - Diet: Easy to chew with thins DASH/carb consistent Patient seen and examined with the resident. We discussed the case. I have directed the care. I edited the note. The patient requires acute rehab with 3 hours of daily therapies at least 5 out of 7 days and close physiatry follow up. It would be better to dose Remeron at bedtime to aid sleep.  #Rehab for gait abnormality, dysarthria and expressive aphasia iso acute L corona radiata stroke  -Imaging as above  -s/p ILR 1/24  -s/p  EEG 1/22: generalized slowing, no seizures  - A1C 4.9, , TSH 2.81  -c/w ASA and Plavix for 90 days  -c/w Lipitor 80 mg qhs  -PT/OT/SLP/Neuropsych eval and treat   - mobility and ADLs and cognition improving    #mechanical fall 2/8  - VSS and PE unremarkable  - f/u CTH; pending official read    #HTN  -c/w amlodipine 5 mg daily  -c/w Coreg 3.125 mg q12h  -Monitor BP, stable in good range     #HLD  -c/w Lipitor 80 mg qhs    #DM2  - A1C with Estimated Average Glucose Result: 4.9: Method: Immunoassay - well controlled on no meds at this time.  - on low dose gabapentin at night. No c/o of pain or dysesthesias. d/c and monitor.    #Hx of seizures  -c/w Keppra 500 bid    #Hx of Parkinson’s Syndrome  #Hx of dementia. Mild and does not interfere with patient's ability to participate in and benefit from acute rehab.  -c/w home meds sinemet, entacapone, and rivastigmine     #Anxiety/depression  #Insomnia  -c/w mirtazapine 7.5 adjusted to daily at bedtime starting 2/11 to aid sleep  -was on Trazadone 100 mg qhs - given increased confusion, decreased to 50 mg hs   -c/w melatonin 3 mg qhs prn  - sleeping well and more alert during the day    #Anemia  -H/H decreased  -No active bleeding indications   -Repeat CBC and monitor     -Pain control: Tylenol prn    -GI/Bowel Mgmt: senna/Miralax    - Diet: Easy to chew with thins DASH/carb consistent

## 2025-02-10 NOTE — DISCHARGE NOTE PROVIDER - CARE PROVIDER_API CALL
Laverne San  Neurology  [address] [place] [id number]  Phone: [phone]  Fax: [phone]  Follow Up Time:     Ha Tejada  Internal Medicine  [address] [place] [address], [facility] [address]  [place] [address]  Phone: [phone]  Fax: [phone]  Follow Up Time:

## 2025-02-10 NOTE — DISCHARGE NOTE PROVIDER - NSDCFUADDAPPT_GEN_ALL_CORE_FT
Need Psycho therapy   After  DC, Neuropsychologist will schedule  Via telemedicine.    Need Psycho therapy   After  DC, Neuropsychologist will schedule  Via telemedicine.   Also neuro stroke clinic will call back with follow up appointments . Ep cardiology appointments are Made  for you , see the dates  and follow up  .

## 2025-02-10 NOTE — PROGRESS NOTE ADULT - SUBJECTIVE AND OBJECTIVE BOX
Patient is a 74 y old male who presents with a chief complaint of acute stroke and Parkinson's Syndrome with decline in function (24 Jan 2025 13:28)      HPI:    74 M PMH of Parkinson's, HTN, hx ischemic CVA x2, type 2 DM, dementia and chronic back pain, Hx of seizures on keppra BIBA from [place] facility  or generalized weakness, confusion, decreased PO intake and multiple witnessed falls over the past few days. He had two witnessed falls the day prior to admission where he was weak and slumped to the ground. No LOC, not on AC. Per staff, he is normally alert and oriented x 3 and able to perform ADLs, but for the past few days has been increasingly confused. He was evaluated in the ED the day prior for similar complaints, had a negative work up and was discharged. Infectious and trauma workup negative. On 1/21 patient was notes to have expressive aphasia and R facial droop. Evaluated by neurology, MRI ordered and showed acute stroke in the L corona radiata. CTA Head/Neck showed severe stenosis left MCA proximal M2. Moderate narrowing right proximal vertebral artery On 1/22 new onset L tongue deviation and mild dysarthria - repeat MRI brain with no new acute findings. ILR placed 1/24.    Prior to admission, the patient was independent in ADLs and ambulation without an assistive device Patient now requires Reza for bed mobility, modA for transfers, ambulating 40’x2 with RW and modA,  modA for LBD. Therefore, there is a significant functional decline from baseline. Patients also with medical comorbidities of DM, HTN, requiring medication management and monitoring of vitals by Physiatry team and nursing. To return home it is in the patient’s best interest to have acute inpatient rehabilitative services to undergo intensive interdisciplinary therapy. Furthermore, the patient is motivated and is able to tolerate up to 3 hours of daily therapy for 5-6 days a week for a total of 15 hours a week. Evaluated by Physiatry and deemed to be an excellent candidate for admission to  for acute inpatient rehab. Admitted to Acute Rehab on 1/24/25    LS: Lives alone in a private home, 6STE  PLOF: independent in ADLs and ambulation with no AS     (24 Jan 2025 13:28)    TODAY'S SUBJECTIVE & REVIEW OF SYMPTOMS:  Patient was seen and examined at bedside this morning. No acute overnight events. No new complaints offered. Ecchymosis noted on RUE along triceps region improving. Patient in good spirits and participating/tolerating in therapies well. Voiding spontaneously and has a regular BM. Vitals reviewed.    H/H decreased on AM labs; will repeat and monitor. No active bleeding noted.     CLOF: Bed mobility supervision; Transfers sup/touchA-partialA; ambulation 150 ft x2/RW/touchA; UBD partialA; LBD partialA       Constitutional:    [   ] WNL           [   ] poor appetite   [   ] insomnia   [   ] tired   [ x] weak   Cardio:                [ x  ] WNL           [   ] CP   [   ] SY   [   ] palpitations               Resp:                   [ x  ] WNL           [   ] SOB   [   ] cough   [   ] wheezing   GI:                        [  x ] WNL           [   ] constipation   [   ] diarrhea   [   ] abdominal pain   [   ] nausea   [   ] emesis                                :                      [   x] WNL           [   ] LAM  [   ] dysuria   [   ] difficulty voiding             Endo:                   [  x ] WNL          [   ] polyuria   [   ] temperature intolerance                 Skin:                     [  x ] WNL          [   ] pain   [   ] wound   [   ] rash   MSK:                    [  x ] WNL          [   ] muscle pain   [   ] joint pain/ stiffness   [   ] muscle tenderness   [   ] swelling   Neuro:                 [ x  ] WNL          [   ] HA   [   ] change in vision   [   ] tremor   [   ] weakness   [   ]dysphagia              Cognitive:           [   ] WNL           [ x  ]confusion at times - feels 'off'   Psych:                  [   ] WNL           [   ] hallucinations   [   ]agitation   [   ] delusion   [ x  ]depression/ anxiety    PHYSICAL EXAM  Vital Signs Last 24 Hrs  T(C): 36.3 (02-10-25 @ 06:04), Max: 36.8 (02-09-25 @ 20:52)  T(F): 97.4 (02-10-25 @ 06:04), Max: 98.2 (02-09-25 @ 20:52)  HR: 62 (02-10-25 @ 06:04) (62 - 92)  BP: 115/74 (02-10-25 @ 06:04) (115/74 - 156/89)  BP(mean): 102 (02-10-25 @ 06:04) (102 - 111)  RR: 18 (02-10-25 @ 06:04) (18 - 19)  SpO2: 98% (02-10-25 @ 06:04) (98% - 98%)    General:[x   ] NAD, Resting Comfortable,   [   ] other:                                HEENT: [ x  ] NC/AT, EOMI, PERRL , Normal Conjunctivae,   [   ] other:  Cardio: [  x ] RRR, no murmur   [   ] other:                              Pulm: [  x ] No Respiratory Distress,  Lungs CTAB,   [   ] other:                       Abdomen: [x   ]ND/NT, Soft,   [   ] other:    : [ x  ] NO LAM CATHETER, [   ] LAM CATHETER- no meatal tear, no discharge, [   ] other:                                            MSK: [x   ] No joint swelling, Full ROM,   [   ] other:                                         Ext: [  x ]No C/C/E, No calf tenderness,   [   ]other:    Skin: [ x  ]intact,   [x   ] other:       Ecchymosis noted on RUE along triceps region                                                             Neurological Examination:  Cognitive: [    ] AAO x 3,   [ x   ]  other: AOx2 - not place                                                                     Attention:  [  x  ] intact,   [  ]  other:                         Memory: [    ] intact,    [ x   ]  other:   2/3 delayed recall with verbal cues  Mood/Affect: [  x  ] wnl,    [    ]  other:                                                                             Communication: [    ]Fluent, no dysarthria, following commands:  [   x ] other: mild dysarthria, expressive aphasia, mild anomia, hypophonia   CN II - XII:  [    ] intact,  [ x   ] other: R facial droop                                                                                        Motor:   RIGHT UE: [   ] WNL,  [x   ] other: 4+/5  LEFT    UE: [  x ] WNL,  [   ] other:  RIGHT LE: [   ] WNL,  [x   ] other: 4+/5  LEFT    LE: [x   ] WNL,  [   ] other:    Tone: [  x ] wnl,   [    ]  other:  DTRs: [   ]symmetric, [ x  ] other: R patellar reflex brisk 3+  Coordination:   [x    ] intact,   [    ] other:                                                                           Sensory: [   x ] Intact to light touch,   [    ] other:    MEDICATIONS  (STANDING):  amLODIPine   Tablet 5 milliGRAM(s) Oral daily  aspirin  chewable 81 milliGRAM(s) Oral daily  atorvastatin 80 milliGRAM(s) Oral at bedtime  carbidopa/levodopa  25/100 2 Tablet(s) Oral three times a day  carbidopa/levodopa CR 50/200 1 Tablet(s) Oral <User Schedule>  carvedilol 3.125 milliGRAM(s) Oral every 12 hours  chlorhexidine 2% Cloths 1 Application(s) Topical <User Schedule>  clopidogrel Tablet 75 milliGRAM(s) Oral daily  dextrose 5%. 1000 milliLiter(s) (100 mL/Hr) IV Continuous <Continuous>  dextrose 5%. 1000 milliLiter(s) (50 mL/Hr) IV Continuous <Continuous>  dextrose 50% Injectable 25 Gram(s) IV Push once  enoxaparin Injectable 40 milliGRAM(s) SubCutaneous every 24 hours  entacapone 200 milliGRAM(s) Oral three times a day  glucagon  Injectable 1 milliGRAM(s) IntraMuscular once  guaifenesin/dextromethorphan Oral Liquid 10 milliLiter(s) Oral every 6 hours  insulin lispro (ADMELOG) corrective regimen sliding scale   SubCutaneous three times a day before meals  levETIRAcetam 500 milliGRAM(s) Oral two times a day  mirtazapine 7.5 milliGRAM(s) Oral daily  polyethylene glycol 3350 17 Gram(s) Oral daily  rivastigmine patch  9.5 mG/24 Hr(s). 1 Patch Transdermal every 24 hours  senna 2 Tablet(s) Oral at bedtime  traZODone 50 milliGRAM(s) Oral at bedtime    MEDICATIONS  (PRN):  acetaminophen     Tablet .. 650 milliGRAM(s) Oral every 6 hours PRN Temp greater or equal to 38C (100.4F), Mild Pain (1 - 3)  aluminum hydroxide/magnesium hydroxide/simethicone Suspension 30 milliLiter(s) Oral every 4 hours PRN Dyspepsia  dextrose Oral Gel 15 Gram(s) Oral once PRN Blood Glucose LESS THAN 70 milliGRAM(s)/deciliter  magnesium hydroxide Suspension 30 milliLiter(s) Oral daily PRN Constipation  melatonin 3 milliGRAM(s) Oral at bedtime PRN Insomnia    RECENT LABS/IMAGING                        13.5   8.21  )-----------( 148      ( 10 Feb 2025 06:30 )             39.7     02-10    140  |  108  |  18  ----------------------------<  85  4.6   |  25  |  0.9    Ca    8.7      10 Feb 2025 06:30  Mg     1.8     02-10    TPro  5.3[L]  /  Alb  3.3[L]  /  TBili  0.5  /  DBili  x   /  AST  34  /  ALT  9   /  AlkPhos  87  02-10      Urinalysis Basic - ( 10 Feb 2025 06:30 )    Color: x / Appearance: x / SG: x / pH: x  Gluc: 85 mg/dL / Ketone: x  / Bili: x / Urobili: x   Blood: x / Protein: x / Nitrite: x   Leuk Esterase: x / RBC: x / WBC x   Sq Epi: x / Non Sq Epi: x / Bacteria: x      POCT Blood Glucose.: 112 mg/dL (02-10-25 @ 07:21)  POCT Blood Glucose.: 216 mg/dL (02-09-25 @ 21:04)  POCT Blood Glucose.: 100 mg/dL (02-09-25 @ 16:31)  POCT Blood Glucose.: 178 mg/dL (02-09-25 @ 11:36)  POCT Blood Glucose.: 85 mg/dL (02-09-25 @ 07:28)  POCT Blood Glucose.: 133 mg/dL (02-08-25 @ 21:16)  POCT Blood Glucose.: 152 mg/dL (02-08-25 @ 16:26)  POCT Blood Glucose.: 86 mg/dL (02-08-25 @ 11:33)  POCT Blood Glucose.: 150 mg/dL (02-08-25 @ 08:10)  POCT Blood Glucose.: 124 mg/dL (02-08-25 @ 05:50)  POCT Blood Glucose.: 139 mg/dL (02-07-25 @ 21:03)  POCT Blood Glucose.: 101 mg/dL (02-07-25 @ 16:40)  POCT Blood Glucose.: 134 mg/dL (02-07-25 @ 10:48)  POCT Blood Glucose.: 103 mg/dL (02-07-25 @ 07:27)  POCT Blood Glucose.: 192 mg/dL (02-06-25 @ 21:03)  POCT Blood Glucose.: 113 mg/dL (02-06-25 @ 16:35)                          15.4   9.34  )-----------( 173      ( 03 Feb 2025 06:27 )             45.3     02-03    142  |  105  |  17  ----------------------------<  74  4.7   |  23  |  1.1    Ca    8.7      03 Feb 2025 06:27  Mg     2.1     02-03    TPro  5.6[L]  /  Alb  3.7  /  TBili  0.7  /  DBili  x   /  AST  23  /  ALT  11  /  AlkPhos  93  02-03    CAPILLARY BLOOD GLUCOSE    POCT Blood Glucose.: 247 mg/dL (04 Feb 2025 16:12)  POCT Blood Glucose.: 201 mg/dL (04 Feb 2025 11:09)  POCT Blood Glucose.: 97 mg/dL (04 Feb 2025 07:24)  POCT Blood Glucose.: 111 mg/dL (03 Feb 2025 21:00)    POCT Blood Glucose.: 145 mg/dL (02-03-25 @ 11:47)  POCT Blood Glucose.: 106 mg/dL (02-03-25 @ 07:30)  POCT Blood Glucose.: 156 mg/dL (02-02-25 @ 21:52)  POCT Blood Glucose.: 128 mg/dL (02-02-25 @ 16:03)  POCT Blood Glucose.: 169 mg/dL (02-02-25 @ 11:19)  POCT Blood Glucose.: 139 mg/dL (02-02-25 @ 07:29)  POCT Blood Glucose.: 155 mg/dL (02-01-25 @ 21:01)  POCT Blood Glucose.: 95 mg/dL (02-01-25 @ 16:21)  POCT Blood Glucose.: 285 mg/dL (02-01-25 @ 11:27)  POCT Blood Glucose.: 99 mg/dL (02-01-25 @ 07:33)  POCT Blood Glucose.: 123 mg/dL (01-31-25 @ 20:46)  POCT Blood Glucose.: 102 mg/dL (01-31-25 @ 16:23)

## 2025-02-11 ENCOUNTER — TRANSCRIPTION ENCOUNTER (OUTPATIENT)
Age: 75
End: 2025-02-11

## 2025-02-11 LAB
GLUCOSE BLDC GLUCOMTR-MCNC: 102 MG/DL — HIGH (ref 70–99)
GLUCOSE BLDC GLUCOMTR-MCNC: 116 MG/DL — HIGH (ref 70–99)
GLUCOSE BLDC GLUCOMTR-MCNC: 146 MG/DL — HIGH (ref 70–99)
GLUCOSE BLDC GLUCOMTR-MCNC: 166 MG/DL — HIGH (ref 70–99)
HCT VFR BLD CALC: 41.6 % — LOW (ref 42–52)
HGB BLD-MCNC: 14.1 G/DL — SIGNIFICANT CHANGE UP (ref 14–18)
MCHC RBC-ENTMCNC: 33.4 PG — HIGH (ref 27–31)
MCHC RBC-ENTMCNC: 33.9 G/DL — SIGNIFICANT CHANGE UP (ref 32–37)
MCV RBC AUTO: 98.6 FL — HIGH (ref 80–94)
NRBC # BLD: 0 /100 WBCS — SIGNIFICANT CHANGE UP (ref 0–0)
NRBC BLD-RTO: 0 /100 WBCS — SIGNIFICANT CHANGE UP (ref 0–0)
PLATELET # BLD AUTO: 152 K/UL — SIGNIFICANT CHANGE UP (ref 130–400)
PMV BLD: 9.9 FL — SIGNIFICANT CHANGE UP (ref 7.4–10.4)
RBC # BLD: 4.22 M/UL — LOW (ref 4.7–6.1)
RBC # FLD: 11.5 % — SIGNIFICANT CHANGE UP (ref 11.5–14.5)
WBC # BLD: 8.54 K/UL — SIGNIFICANT CHANGE UP (ref 4.8–10.8)
WBC # FLD AUTO: 8.54 K/UL — SIGNIFICANT CHANGE UP (ref 4.8–10.8)

## 2025-02-11 RX ORDER — POLYETHYLENE GLYCOL 3350 17 G/17G
17 POWDER, FOR SOLUTION ORAL
Qty: 510 | Refills: 0
Start: 2025-02-11 | End: 2025-03-12

## 2025-02-11 RX ORDER — TRAZODONE HCL 100 MG
1 TABLET ORAL
Qty: 30 | Refills: 0
Start: 2025-02-11 | End: 2025-03-12

## 2025-02-11 RX ORDER — DEXTROMETHORPHAN HBR AND GUAIFENESIN ORAL SOLUTION 10; 100 MG/5ML; MG/5ML
10 LIQUID ORAL
Qty: 280 | Refills: 0
Start: 2025-02-11 | End: 2025-02-17

## 2025-02-11 RX ORDER — CARVEDILOL 6.25 MG
1 TABLET ORAL
Qty: 60 | Refills: 0
Start: 2025-02-11 | End: 2025-03-12

## 2025-02-11 RX ORDER — ASPIRIN 81 MG/1
1 TABLET, COATED ORAL
Qty: 30 | Refills: 0
Start: 2025-02-11 | End: 2025-03-12

## 2025-02-11 RX ORDER — ATORVASTATIN CALCIUM 80 MG/1
1 TABLET, FILM COATED ORAL
Qty: 30 | Refills: 0
Start: 2025-02-11 | End: 2025-03-12

## 2025-02-11 RX ORDER — AMLODIPINE BESYLATE 5 MG
1 TABLET ORAL
Qty: 30 | Refills: 0
Start: 2025-02-11 | End: 2025-03-12

## 2025-02-11 RX ADMIN — ENTACAPONE 200 MILLIGRAM(S): 200 TABLET, FILM COATED ORAL at 06:03

## 2025-02-11 RX ADMIN — LEVETIRACETAM 500 MILLIGRAM(S): 750 TABLET, FILM COATED ORAL at 06:03

## 2025-02-11 RX ADMIN — ATORVASTATIN CALCIUM 80 MILLIGRAM(S): 80 TABLET, FILM COATED ORAL at 21:20

## 2025-02-11 RX ADMIN — POLYETHYLENE GLYCOL 3350 17 GRAM(S): 17 POWDER, FOR SOLUTION ORAL at 11:50

## 2025-02-11 RX ADMIN — Medication 1: at 11:47

## 2025-02-11 RX ADMIN — ANTISEPTIC SURGICAL SCRUB 1 APPLICATION(S): 0.04 SOLUTION TOPICAL at 06:09

## 2025-02-11 RX ADMIN — ENTACAPONE 200 MILLIGRAM(S): 200 TABLET, FILM COATED ORAL at 13:06

## 2025-02-11 RX ADMIN — Medication 2 TABLET(S): at 21:20

## 2025-02-11 RX ADMIN — Medication 1 TABLET(S): at 17:54

## 2025-02-11 RX ADMIN — DEXTROMETHORPHAN HBR AND GUAIFENESIN ORAL SOLUTION 10 MILLILITER(S): 10; 100 LIQUID ORAL at 06:02

## 2025-02-11 RX ADMIN — MIRTAZAPINE 7.5 MILLIGRAM(S): 30 TABLET, FILM COATED ORAL at 21:20

## 2025-02-11 RX ADMIN — ASPIRIN 81 MILLIGRAM(S): 81 TABLET, COATED ORAL at 11:46

## 2025-02-11 RX ADMIN — DEXTROMETHORPHAN HBR AND GUAIFENESIN ORAL SOLUTION 10 MILLILITER(S): 10; 100 LIQUID ORAL at 17:53

## 2025-02-11 RX ADMIN — Medication 2 TABLET(S): at 13:06

## 2025-02-11 RX ADMIN — RIVASTIGMINE 1 PATCH: 4.6 PATCH, EXTENDED RELEASE TRANSDERMAL at 06:03

## 2025-02-11 RX ADMIN — Medication 3.12 MILLIGRAM(S): at 06:03

## 2025-02-11 RX ADMIN — Medication 2 TABLET(S): at 06:03

## 2025-02-11 RX ADMIN — LEVETIRACETAM 500 MILLIGRAM(S): 750 TABLET, FILM COATED ORAL at 17:54

## 2025-02-11 RX ADMIN — Medication 75 MILLIGRAM(S): at 11:47

## 2025-02-11 RX ADMIN — DEXTROMETHORPHAN HBR AND GUAIFENESIN ORAL SOLUTION 10 MILLILITER(S): 10; 100 LIQUID ORAL at 23:23

## 2025-02-11 RX ADMIN — ENOXAPARIN SODIUM 40 MILLIGRAM(S): 100 INJECTION SUBCUTANEOUS at 06:03

## 2025-02-11 RX ADMIN — DEXTROMETHORPHAN HBR AND GUAIFENESIN ORAL SOLUTION 10 MILLILITER(S): 10; 100 LIQUID ORAL at 11:48

## 2025-02-11 RX ADMIN — Medication 5 MILLIGRAM(S): at 06:03

## 2025-02-11 RX ADMIN — Medication 3.12 MILLIGRAM(S): at 17:54

## 2025-02-11 RX ADMIN — ENTACAPONE 200 MILLIGRAM(S): 200 TABLET, FILM COATED ORAL at 21:19

## 2025-02-11 RX ADMIN — Medication 50 MILLIGRAM(S): at 21:21

## 2025-02-11 NOTE — DISCHARGE NOTE NURSING/CASE MANAGEMENT/SOCIAL WORK - FINANCIAL ASSISTANCE
[facility] provides services at a reduced cost to those who are determined to be eligible through [facility]’s financial assistance program. Information regarding [facility]’s financial assistance program can be found by going to [email or web address] or by calling [phone].

## 2025-02-11 NOTE — DISCHARGE NOTE NURSING/CASE MANAGEMENT/SOCIAL WORK - PATIENT PORTAL LINK FT
You can access the FollowMyHealth Patient Portal offered by [facility] by registering at the following website: [email or web address]. By joining [employer]’s FollowMyHealth portal, you will also be able to view your health information using other applications (apps) compatible with our system.

## 2025-02-11 NOTE — PROGRESS NOTE ADULT - SUBJECTIVE AND OBJECTIVE BOX
Patient is a 74 y old male who presents with a chief complaint of acute stroke and Parkinson's Syndrome with decline in function (24 Jan 2025 13:28)      HPI:    74 M PMH of Parkinson's, HTN, hx ischemic CVA x2, type 2 DM, dementia and chronic back pain, Hx of seizures on keppra BIBA from [place] facility  or generalized weakness, confusion, decreased PO intake and multiple witnessed falls over the past few days. He had two witnessed falls the day prior to admission where he was weak and slumped to the ground. No LOC, not on AC. Per staff, he is normally alert and oriented x 3 and able to perform ADLs, but for the past few days has been increasingly confused. He was evaluated in the ED the day prior for similar complaints, had a negative work up and was discharged. Infectious and trauma workup negative. On 1/21 patient was notes to have expressive aphasia and R facial droop. Evaluated by neurology, MRI ordered and showed acute stroke in the L corona radiata. CTA Head/Neck showed severe stenosis left MCA proximal M2. Moderate narrowing right proximal vertebral artery On 1/22 new onset L tongue deviation and mild dysarthria - repeat MRI brain with no new acute findings. ILR placed 1/24.    Prior to admission, the patient was independent in ADLs and ambulation without an assistive device Patient now requires Reza for bed mobility, modA for transfers, ambulating 40’x2 with RW and modA,  modA for LBD. Therefore, there is a significant functional decline from baseline. Patients also with medical comorbidities of DM, HTN, requiring medication management and monitoring of vitals by Physiatry team and nursing. To return home it is in the patient’s best interest to have acute inpatient rehabilitative services to undergo intensive interdisciplinary therapy. Furthermore, the patient is motivated and is able to tolerate up to 3 hours of daily therapy for 5-6 days a week for a total of 15 hours a week. Evaluated by Physiatry and deemed to be an excellent candidate for admission to  for acute inpatient rehab. Admitted to Acute Rehab on 1/24/25    LS: Lives alone in a private home, 6STE  PLOF: independent in ADLs and ambulation with no AS     (24 Jan 2025 13:28)    TODAY'S SUBJECTIVE & REVIEW OF SYMPTOMS:  Patient was seen and examined at bedside this morning. No acute overnight events. No new complaints offered. Patient in good spirits and participating/tolerating in therapies well. Voiding spontaneously and has a regular BM. Vitals reviewed.    H/H improved on repeat labs.     CLOF: Bed mobility supervision; Transfers sup/touchA-partialA; ambulation 150 ft x2/RW/touchA; UBD partialA; LBD partialA       Constitutional:    [   ] WNL           [   ] poor appetite   [   ] insomnia   [   ] tired   [ x] weak   Cardio:                [ x  ] WNL           [   ] CP   [   ] SY   [   ] palpitations               Resp:                   [ x  ] WNL           [   ] SOB   [   ] cough   [   ] wheezing   GI:                        [  x ] WNL           [   ] constipation   [   ] diarrhea   [   ] abdominal pain   [   ] nausea   [   ] emesis                                :                      [   x] WNL           [   ] LAM  [   ] dysuria   [   ] difficulty voiding             Endo:                   [  x ] WNL          [   ] polyuria   [   ] temperature intolerance                 Skin:                     [  x ] WNL          [   ] pain   [   ] wound   [   ] rash   MSK:                    [  x ] WNL          [   ] muscle pain   [   ] joint pain/ stiffness   [   ] muscle tenderness   [   ] swelling   Neuro:                 [ x  ] WNL          [   ] HA   [   ] change in vision   [   ] tremor   [   ] weakness   [   ]dysphagia              Cognitive:           [   ] WNL           [ x  ]confusion at times - feels 'off'   Psych:                  [   ] WNL           [   ] hallucinations   [   ]agitation   [   ] delusion   [ x  ]depression/ anxiety    PHYSICAL EXAM  Vital Signs Last 24 Hrs  T(C): 36.3 (02-11-25 @ 06:09), Max: 36.6 (02-10-25 @ 20:52)  T(F): 97.4 (02-11-25 @ 06:09), Max: 97.9 (02-10-25 @ 20:52)  HR: 76 (02-11-25 @ 06:09) (53 - 76)  BP: 126/79 (02-11-25 @ 06:09) (112/67 - 145/79)  BP(mean): --  RR: 18 (02-11-25 @ 06:09) (18 - 18)  SpO2: 98% (02-11-25 @ 06:09) (98% - 98%)    General:[x   ] NAD, Resting Comfortable,   [   ] other:                                HEENT: [ x  ] NC/AT, EOMI, PERRL , Normal Conjunctivae,   [   ] other:  Cardio: [  x ] RRR, no murmur   [   ] other:                              Pulm: [  x ] No Respiratory Distress,  Lungs CTAB,   [   ] other:                       Abdomen: [x   ]ND/NT, Soft,   [   ] other:    : [ x  ] NO LAM CATHETER, [   ] LAM CATHETER- no meatal tear, no discharge, [   ] other:                                            MSK: [x   ] No joint swelling, Full ROM,   [   ] other:                                         Ext: [  x ]No C/C/E, No calf tenderness,   [   ]other:    Skin: [ x  ]intact,   [x   ] other:       Ecchymosis noted on RUE along triceps region                                                             Neurological Examination:  Cognitive: [    ] AAO x 3,   [ x   ]  other: AOx2 - not place                                                                     Attention:  [  x  ] intact,   [  ]  other:                         Memory: [    ] intact,    [ x   ]  other:   2/3 delayed recall with verbal cues  Mood/Affect: [  x  ] wnl,    [    ]  other:                                                                             Communication: [    ]Fluent, no dysarthria, following commands:  [   x ] other: mild dysarthria, expressive aphasia, mild anomia, hypophonia   CN II - XII:  [    ] intact,  [ x   ] other: R facial droop                                                                                        Motor:   RIGHT UE: [   ] WNL,  [x   ] other: 4+/5  LEFT    UE: [  x ] WNL,  [   ] other:  RIGHT LE: [   ] WNL,  [x   ] other: 4+/5  LEFT    LE: [x   ] WNL,  [   ] other:    Tone: [  x ] wnl,   [    ]  other:  DTRs: [   ]symmetric, [ x  ] other: R patellar reflex brisk 3+  Coordination:   [x    ] intact,   [    ] other:                                                                           Sensory: [   x ] Intact to light touch,   [    ] other:    MEDICATIONS  (STANDING):  amLODIPine   Tablet 5 milliGRAM(s) Oral daily  aspirin  chewable 81 milliGRAM(s) Oral daily  atorvastatin 80 milliGRAM(s) Oral at bedtime  carbidopa/levodopa  25/100 2 Tablet(s) Oral three times a day  carbidopa/levodopa CR 50/200 1 Tablet(s) Oral <User Schedule>  carvedilol 3.125 milliGRAM(s) Oral every 12 hours  chlorhexidine 2% Cloths 1 Application(s) Topical <User Schedule>  clopidogrel Tablet 75 milliGRAM(s) Oral daily  dextrose 5%. 1000 milliLiter(s) (100 mL/Hr) IV Continuous <Continuous>  dextrose 5%. 1000 milliLiter(s) (50 mL/Hr) IV Continuous <Continuous>  dextrose 50% Injectable 25 Gram(s) IV Push once  enoxaparin Injectable 40 milliGRAM(s) SubCutaneous every 24 hours  entacapone 200 milliGRAM(s) Oral three times a day  glucagon  Injectable 1 milliGRAM(s) IntraMuscular once  guaifenesin/dextromethorphan Oral Liquid 10 milliLiter(s) Oral every 6 hours  insulin lispro (ADMELOG) corrective regimen sliding scale   SubCutaneous three times a day before meals  levETIRAcetam 500 milliGRAM(s) Oral two times a day  mirtazapine 7.5 milliGRAM(s) Oral at bedtime  polyethylene glycol 3350 17 Gram(s) Oral daily  rivastigmine patch  9.5 mG/24 Hr(s). 1 Patch Transdermal every 24 hours  senna 2 Tablet(s) Oral at bedtime  traZODone 50 milliGRAM(s) Oral at bedtime    MEDICATIONS  (PRN):  acetaminophen     Tablet .. 650 milliGRAM(s) Oral every 6 hours PRN Temp greater or equal to 38C (100.4F), Mild Pain (1 - 3)  aluminum hydroxide/magnesium hydroxide/simethicone Suspension 30 milliLiter(s) Oral every 4 hours PRN Dyspepsia  dextrose Oral Gel 15 Gram(s) Oral once PRN Blood Glucose LESS THAN 70 milliGRAM(s)/deciliter  magnesium hydroxide Suspension 30 milliLiter(s) Oral daily PRN Constipation  melatonin 3 milliGRAM(s) Oral at bedtime PRN Insomnia      RECENT LABS/IMAGING                                 14.1   8.54  )-----------( 152      ( 11 Feb 2025 06:14 )             41.6     02-10    140  |  108  |  18  ----------------------------<  85  4.6   |  25  |  0.9    Ca    8.7      10 Feb 2025 06:30  Mg     1.8     02-10    TPro  5.3[L]  /  Alb  3.3[L]  /  TBili  0.5  /  DBili  x   /  AST  34  /  ALT  9   /  AlkPhos  87  02-10      Urinalysis Basic - ( 10 Feb 2025 06:30 )    Color: x / Appearance: x / SG: x / pH: x  Gluc: 85 mg/dL / Ketone: x  / Bili: x / Urobili: x   Blood: x / Protein: x / Nitrite: x   Leuk Esterase: x / RBC: x / WBC x   Sq Epi: x / Non Sq Epi: x / Bacteria: x      POCT Blood Glucose.: 102 mg/dL (02-11-25 @ 07:15)  POCT Blood Glucose.: 173 mg/dL (02-10-25 @ 21:01)  POCT Blood Glucose.: 128 mg/dL (02-10-25 @ 15:59)  POCT Blood Glucose.: 148 mg/dL (02-10-25 @ 11:19)               13.5   8.21  )-----------( 148      ( 10 Feb 2025 06:30 )             39.7       Urinalysis Basic - ( 10 Feb 2025 06:30 )    Color: x / Appearance: x / SG: x / pH: x  Gluc: 85 mg/dL / Ketone: x  / Bili: x / Urobili: x   Blood: x / Protein: x / Nitrite: x   Leuk Esterase: x / RBC: x / WBC x   Sq Epi: x / Non Sq Epi: x / Bacteria: x      POCT Blood Glucose.: 112 mg/dL (02-10-25 @ 07:21)  POCT Blood Glucose.: 216 mg/dL (02-09-25 @ 21:04)  POCT Blood Glucose.: 100 mg/dL (02-09-25 @ 16:31)  POCT Blood Glucose.: 178 mg/dL (02-09-25 @ 11:36)  POCT Blood Glucose.: 85 mg/dL (02-09-25 @ 07:28)  POCT Blood Glucose.: 133 mg/dL (02-08-25 @ 21:16)  POCT Blood Glucose.: 152 mg/dL (02-08-25 @ 16:26)  POCT Blood Glucose.: 86 mg/dL (02-08-25 @ 11:33)  POCT Blood Glucose.: 150 mg/dL (02-08-25 @ 08:10)  POCT Blood Glucose.: 124 mg/dL (02-08-25 @ 05:50)  POCT Blood Glucose.: 139 mg/dL (02-07-25 @ 21:03)  POCT Blood Glucose.: 101 mg/dL (02-07-25 @ 16:40)  POCT Blood Glucose.: 134 mg/dL (02-07-25 @ 10:48)  POCT Blood Glucose.: 103 mg/dL (02-07-25 @ 07:27)  POCT Blood Glucose.: 192 mg/dL (02-06-25 @ 21:03)  POCT Blood Glucose.: 113 mg/dL (02-06-25 @ 16:35)                          15.4   9.34  )-----------( 173      ( 03 Feb 2025 06:27 )             45.3     02-03    142  |  105  |  17  ----------------------------<  74  4.7   |  23  |  1.1    Ca    8.7      03 Feb 2025 06:27  Mg     2.1     02-03    TPro  5.6[L]  /  Alb  3.7  /  TBili  0.7  /  DBili  x   /  AST  23  /  ALT  11  /  AlkPhos  93  02-03    CAPILLARY BLOOD GLUCOSE    POCT Blood Glucose.: 247 mg/dL (04 Feb 2025 16:12)  POCT Blood Glucose.: 201 mg/dL (04 Feb 2025 11:09)  POCT Blood Glucose.: 97 mg/dL (04 Feb 2025 07:24)  POCT Blood Glucose.: 111 mg/dL (03 Feb 2025 21:00)    POCT Blood Glucose.: 145 mg/dL (02-03-25 @ 11:47)  POCT Blood Glucose.: 106 mg/dL (02-03-25 @ 07:30)  POCT Blood Glucose.: 156 mg/dL (02-02-25 @ 21:52)  POCT Blood Glucose.: 128 mg/dL (02-02-25 @ 16:03)  POCT Blood Glucose.: 169 mg/dL (02-02-25 @ 11:19)  POCT Blood Glucose.: 139 mg/dL (02-02-25 @ 07:29)  POCT Blood Glucose.: 155 mg/dL (02-01-25 @ 21:01)  POCT Blood Glucose.: 95 mg/dL (02-01-25 @ 16:21)  POCT Blood Glucose.: 285 mg/dL (02-01-25 @ 11:27)  POCT Blood Glucose.: 99 mg/dL (02-01-25 @ 07:33)  POCT Blood Glucose.: 123 mg/dL (01-31-25 @ 20:46)  POCT Blood Glucose.: 102 mg/dL (01-31-25 @ 16:23)

## 2025-02-11 NOTE — DISCHARGE NOTE NURSING/CASE MANAGEMENT/SOCIAL WORK - NSDCVIVACCINE_GEN_ALL_CORE_FT
Tdap; 12-May-2020 22:10; Angelina Gomez (RN); [employer]; [id number] (Exp. Date: 19-Apr-2021); IntraMuscular; Deltoid Right.; 0.5 milliLiter(s); VIS (VIS Published: 09-May-2013, VIS Presented: 12-May-2020);

## 2025-02-11 NOTE — PROGRESS NOTE ADULT - ASSESSMENT
74 M PMH of Parkinson's, HTN, hx ischemic CVA x 2, type 2 DM, dementia and chronic back pain, Hx of seizures on Keppra BIBA from [facility] facility  or generalized weakness, confusion, decreased PO intake and multiple witnessed falls over the past few days. He had two witnessed falls the day prior to admission where he was weak and slumped to the ground. No LOC, not on AC. Per staff, he is normally alert and oriented x 3 and able to perform ADLs, but for the past few days has been increasingly confused. He was evaluated in the ED the day prior for similar complaints, had a negative work up and was discharged. Infectious and trauma workup negative. On 1/21 patient was notes to have expressive aphasia and R facial droop. Evaluated by neurology, MRI ordered and showed acute stroke in the L corona radiata. CTA Head/Neck showed severe stenosis left MCA proximal M2. Moderate narrowing right proximal vertebral artery On 1/22 new onset L tongue deviation and mild dysarthria - repeat MRI brain with no new acute findings. ILR placed 1/24. NIHSS 4 on admission.    #Rehab for gait abnormality, dysarthria and expressive aphasia iso acute L corona radiata stroke  -Imaging as above  -s/p ILR 1/24  -s/p  EEG 1/22: generalized slowing, no seizures  - A1C 4.9, , TSH 2.81  -c/w ASA and Plavix for 90 days  -c/w Lipitor 80 mg qhs  -PT/OT/SLP/Neuropsych eval and treat   - mobility and ADLs and cognition improving    #mechanical fall 2/8  - VSS and PE unremarkable  - CTH showed No evidence of acute transcortical infarct, acute intracranial hemorrhage, or mass effect.    #HTN  -c/w amlodipine 5 mg daily  -c/w Coreg 3.125 mg q12h  -Monitor BP, stable in good range     #HLD  -c/w Lipitor 80 mg qhs    #DM2  - A1C with Estimated Average Glucose Result: 4.9: Method: Immunoassay - well controlled on no meds at this time.  - on low dose gabapentin at night. No c/o of pain or dysesthesias. d/c and monitor.    #Hx of seizures  -c/w Keppra 500 bid    #Hx of Parkinson’s Syndrome  #Hx of dementia. Mild and does not interfere with patient's ability to participate in and benefit from acute rehab.  -c/w home meds sinemet, entacapone, and rivastigmine     #Anxiety/depression  #Insomnia  -c/w mirtazapine 7.5 adjusted to daily at bedtime on 2/11 to aid sleep  -was on Trazadone 100 mg qhs - given increased confusion, decreased to 50 mg hs   -c/w melatonin 3 mg qhs prn  - sleeping well and more alert during the day    #Anemia  -H/H decreased  -No active bleeding indications   -Repeat CBC showed improvement in H/H  -Monitor     -Pain control: Tylenol prn    -GI/Bowel Mgmt: senna/Miralax    - Diet: Easy to chew with thins DASH/carb consistent           Precautions / PROPHYLAXIS:      - Falls      - DVT prophylaxis: Lovenox

## 2025-02-11 NOTE — PROGRESS NOTE ADULT - ATTENDING COMMENTS
Patient seen and examined with the resident. We discussed the case. I have directed the care. I edited the note. The patient requires acute rehab with 3 hours of daily therapies at least 5 out of 7 days and close physiatry follow up.  #Rehab for gait abnormality, dysarthria and expressive aphasia iso acute L corona radiata stroke  -Imaging as above  -s/p ILR 1/24  -s/p  EEG 1/22: generalized slowing, no seizures  - A1C 4.9, , TSH 2.81  -c/w ASA and Plavix for 90 days  -c/w Lipitor 80 mg qhs  -PT/OT/SLP/Neuropsych eval and treat   - mobility and ADLs and cognition improving    #mechanical fall 2/8  - VSS and PE unremarkable  - CTH showed No evidence of acute transcortical infarct, acute intracranial hemorrhage, or mass effect.    #HTN  -c/w amlodipine 5 mg daily  -c/w Coreg 3.125 mg q12h  -Monitor BP, stable in good range     #HLD  -c/w Lipitor 80 mg qhs    #DM2  - A1C with Estimated Average Glucose Result: 4.9: Method: Immunoassay - well controlled on no meds at this time.  - on low dose gabapentin at night. No c/o of pain or dysesthesias. d/c and monitor.    #Hx of seizures  -c/w Keppra 500 bid    #Hx of Parkinson’s Syndrome  #Hx of dementia. Mild and does not interfere with patient's ability to participate in and benefit from acute rehab.  -c/w home meds sinemet, entacapone, and rivastigmine     #Anxiety/depression  #Insomnia  -c/w mirtazapine 7.5 adjusted to daily at bedtime on 2/11 to aid sleep  -was on Trazadone 100 mg qhs - given increased confusion, decreased to 50 mg hs   -c/w melatonin 3 mg qhs prn  - sleeping well and more alert during the day    #Anemia  -H/H decreased  -No active bleeding indications   -Repeat CBC showed improvement in H/H  -Monitor     -Pain control: Tylenol prn    -GI/Bowel Mgmt: senna/Miralax    - Diet: Easy to chew with thins DASH/carb consistent

## 2025-02-12 ENCOUNTER — NON-APPOINTMENT (OUTPATIENT)
Age: 75
End: 2025-02-12

## 2025-02-12 VITALS
SYSTOLIC BLOOD PRESSURE: 136 MMHG | DIASTOLIC BLOOD PRESSURE: 85 MMHG | HEART RATE: 71 BPM | RESPIRATION RATE: 18 BRPM | TEMPERATURE: 98 F

## 2025-02-12 LAB
GLUCOSE BLDC GLUCOMTR-MCNC: 108 MG/DL — HIGH (ref 70–99)
GLUCOSE BLDC GLUCOMTR-MCNC: 144 MG/DL — HIGH (ref 70–99)

## 2025-02-12 RX ORDER — CARVEDILOL 6.25 MG
1 TABLET ORAL
Qty: 60 | Refills: 0
Start: 2025-02-12 | End: 2025-03-13

## 2025-02-12 RX ORDER — ENTACAPONE 200 MG/1
1 TABLET, FILM COATED ORAL
Qty: 90 | Refills: 0
Start: 2025-02-12 | End: 2025-03-13

## 2025-02-12 RX ORDER — RIVASTIGMINE 4.6 MG/24H
1 PATCH, EXTENDED RELEASE TRANSDERMAL
Qty: 30 | Refills: 0
Start: 2025-02-12 | End: 2025-03-13

## 2025-02-12 RX ORDER — LEVETIRACETAM 750 MG/1
1 TABLET, FILM COATED ORAL
Qty: 60 | Refills: 0
Start: 2025-02-12 | End: 2025-03-13

## 2025-02-12 RX ORDER — MIRTAZAPINE 30 MG/1
1 TABLET, FILM COATED ORAL
Qty: 30 | Refills: 0
Start: 2025-02-12 | End: 2025-03-13

## 2025-02-12 RX ORDER — ACETAMINOPHEN, DIPHENHYDRAMINE HCL, PHENYLEPHRINE HCL 325; 25; 5 MG/1; MG/1; MG/1
1 TABLET ORAL
Qty: 30 | Refills: 0
Start: 2025-02-12 | End: 2025-03-13

## 2025-02-12 RX ORDER — TRAZODONE HCL 100 MG
0 TABLET ORAL
Refills: 0 | DISCHARGE

## 2025-02-12 RX ORDER — RIVASTIGMINE 4.6 MG/24H
1 PATCH, EXTENDED RELEASE TRANSDERMAL
Qty: 1 | Refills: 0
Start: 2025-02-12 | End: 2025-03-13

## 2025-02-12 RX ORDER — SENNOSIDES 8.6 MG
2 TABLET ORAL
Qty: 60 | Refills: 0
Start: 2025-02-12 | End: 2025-03-13

## 2025-02-12 RX ORDER — MELATONIN 5 MG
1 TABLET ORAL
Refills: 0 | DISCHARGE

## 2025-02-12 RX ORDER — POLYETHYLENE GLYCOL 3350 17 G/17G
17 POWDER, FOR SOLUTION ORAL
Refills: 0 | DISCHARGE

## 2025-02-12 RX ORDER — ACETAMINOPHEN 160 MG/5ML
2 SUSPENSION ORAL
Qty: 240 | Refills: 0
Start: 2025-02-12 | End: 2025-03-13

## 2025-02-12 RX ORDER — CARBIDOPA/LEVODOPA 10MG-100MG
1 TABLET ORAL
Qty: 30 | Refills: 0
Start: 2025-02-12 | End: 2025-03-13

## 2025-02-12 RX ORDER — MIRTAZAPINE 30 MG/1
2 TABLET, FILM COATED ORAL
Refills: 0 | DISCHARGE

## 2025-02-12 RX ORDER — CARBIDOPA/LEVODOPA 25MG-100MG
1 TABLET ORAL
Refills: 0 | DISCHARGE

## 2025-02-12 RX ADMIN — POLYETHYLENE GLYCOL 3350 17 GRAM(S): 17 POWDER, FOR SOLUTION ORAL at 12:49

## 2025-02-12 RX ADMIN — DEXTROMETHORPHAN HBR AND GUAIFENESIN ORAL SOLUTION 10 MILLILITER(S): 10; 100 LIQUID ORAL at 12:43

## 2025-02-12 RX ADMIN — LEVETIRACETAM 500 MILLIGRAM(S): 750 TABLET, FILM COATED ORAL at 06:02

## 2025-02-12 RX ADMIN — RIVASTIGMINE 1 PATCH: 4.6 PATCH, EXTENDED RELEASE TRANSDERMAL at 07:10

## 2025-02-12 RX ADMIN — ANTISEPTIC SURGICAL SCRUB 1 APPLICATION(S): 0.04 SOLUTION TOPICAL at 06:09

## 2025-02-12 RX ADMIN — Medication 2 TABLET(S): at 06:02

## 2025-02-12 RX ADMIN — Medication 3.12 MILLIGRAM(S): at 06:08

## 2025-02-12 RX ADMIN — ENTACAPONE 200 MILLIGRAM(S): 200 TABLET, FILM COATED ORAL at 06:02

## 2025-02-12 RX ADMIN — ASPIRIN 81 MILLIGRAM(S): 81 TABLET, COATED ORAL at 12:48

## 2025-02-12 RX ADMIN — Medication 5 MILLIGRAM(S): at 06:02

## 2025-02-12 RX ADMIN — DEXTROMETHORPHAN HBR AND GUAIFENESIN ORAL SOLUTION 10 MILLILITER(S): 10; 100 LIQUID ORAL at 06:02

## 2025-02-12 RX ADMIN — ENOXAPARIN SODIUM 40 MILLIGRAM(S): 100 INJECTION SUBCUTANEOUS at 06:03

## 2025-02-12 RX ADMIN — Medication 75 MILLIGRAM(S): at 12:48

## 2025-02-12 RX ADMIN — RIVASTIGMINE 1 PATCH: 4.6 PATCH, EXTENDED RELEASE TRANSDERMAL at 08:45

## 2025-02-12 RX ADMIN — RIVASTIGMINE 1 PATCH: 4.6 PATCH, EXTENDED RELEASE TRANSDERMAL at 06:03

## 2025-02-12 NOTE — PROGRESS NOTE ADULT - PROVIDER SPECIALTY LIST ADULT
Neuropsychology
Neuropsychology
Rehab Medicine
Neuropsychology
Rehab Medicine
Neuropsychology
Rehab Medicine
Neuropsychology

## 2025-02-12 NOTE — PROGRESS NOTE ADULT - ATTENDING COMMENTS
Patient seen and examined with the resident. We discussed the case. I have directed the care. I edited the note. He has done wonderfully on acute rehab. He is stable to return to [facility]. He will have around the clock support. He will be set up with home PT, OT, Speech. Neuropsychology will be set up through telemedicine.  #Rehab for gait abnormality, dysarthria and expressive aphasia iso acute L corona radiata stroke  -Imaging as above  -s/p ILR 1/24  -s/p  EEG 1/22: generalized slowing, no seizures  - A1C 4.9, , TSH 2.81  -c/w ASA and Plavix for 90 days  -c/w Lipitor 80 mg qhs  -PT/OT/SLP/Neuropsych eval and treat   - mobility and ADLs and cognition improving    #mechanical fall 2/8  - VSS and PE unremarkable  - CTH showed No evidence of acute transcortical infarct, acute intracranial hemorrhage, or mass effect.    #HTN  -c/w amlodipine 5 mg daily  -c/w Coreg 3.125 mg q12h  -Monitor BP, stable in good range     #HLD  -c/w Lipitor 80 mg qhs    #DM2  - A1C with Estimated Average Glucose Result: 4.9: Method: Immunoassay - well controlled on no meds at this time.  - on low dose gabapentin at night. No c/o of pain or dysesthesias. d/c and monitor.    #Hx of seizures  -c/w Keppra 500 bid    #Hx of Parkinson’s Syndrome  #Hx of dementia. Mild and does not interfere with patient's ability to participate in and benefit from acute rehab.  -c/w home meds sinemet, entacapone, and rivastigmine     #Anxiety/depression  #Insomnia  -c/w mirtazapine 7.5 adjusted to daily at bedtime on 2/11 to aid sleep  -was on Trazadone 100 mg qhs - given increased confusion, decreased to 50 mg hs   -c/w melatonin 3 mg qhs prn  - sleeping well and more alert during the day    #Anemia  -H/H decreased  -No active bleeding indications   -Repeat CBC showed improvement in H/H  -Monitor

## 2025-02-12 NOTE — PROGRESS NOTE ADULT - ASSESSMENT
74 M PMH of Parkinson's, HTN, hx ischemic CVA x 2, type 2 DM, dementia and chronic back pain, Hx of seizures on Keppra BIBA from [facility] facility  or generalized weakness, confusion, decreased PO intake and multiple witnessed falls over the past few days. He had two witnessed falls the day prior to admission where he was weak and slumped to the ground. No LOC, not on AC. Per staff, he is normally alert and oriented x 3 and able to perform ADLs, but for the past few days has been increasingly confused. He was evaluated in the ED the day prior for similar complaints, had a negative work up and was discharged. Infectious and trauma workup negative. On 1/21 patient was notes to have expressive aphasia and R facial droop. Evaluated by neurology, MRI ordered and showed acute stroke in the L corona radiata. CTA Head/Neck showed severe stenosis left MCA proximal M2. Moderate narrowing right proximal vertebral artery On 1/22 new onset L tongue deviation and mild dysarthria - repeat MRI brain with no new acute findings. ILR placed 1/24. NIHSS 4 on admission.    #Rehab for gait abnormality, dysarthria and expressive aphasia iso acute L corona radiata stroke  -Imaging as above  -s/p ILR 1/24  -s/p  EEG 1/22: generalized slowing, no seizures  - A1C 4.9, , TSH 2.81  -c/w ASA and Plavix for 90 days  -c/w Lipitor 80 mg qhs  -PT/OT/SLP/Neuropsych eval and treat   - mobility and ADLs and cognition improving    #mechanical fall 2/8  - VSS and PE unremarkable  - CTH showed No evidence of acute transcortical infarct, acute intracranial hemorrhage, or mass effect.    #HTN  -c/w amlodipine 5 mg daily  -c/w Coreg 3.125 mg q12h  -Monitor BP, stable in good range     #HLD  -c/w Lipitor 80 mg qhs    #DM2  - A1C with Estimated Average Glucose Result: 4.9: Method: Immunoassay - well controlled on no meds at this time.  - on low dose gabapentin at night. No c/o of pain or dysesthesias. d/c and monitor.    #Hx of seizures  -c/w Keppra 500 bid    #Hx of Parkinson’s Syndrome  #Hx of dementia. Mild and does not interfere with patient's ability to participate in and benefit from acute rehab.  -c/w home meds sinemet, entacapone, and rivastigmine     #Anxiety/depression  #Insomnia  -c/w mirtazapine 7.5 adjusted to daily at bedtime on 2/11 to aid sleep  -was on Trazadone 100 mg qhs - given increased confusion, decreased to 50 mg hs   -c/w melatonin 3 mg qhs prn  - sleeping well and more alert during the day    #Anemia  -H/H decreased  -No active bleeding indications   -Repeat CBC showed improvement in H/H  -Monitor     -Pain control: Tylenol prn    -GI/Bowel Mgmt: senna/Miralax    - Diet: Easy to chew with thins DASH/carb consistent           Precautions / PROPHYLAXIS:      - Falls      - DVT prophylaxis: Lovenox     Residential stability

## 2025-02-12 NOTE — PROGRESS NOTE ADULT - SUBJECTIVE AND OBJECTIVE BOX
Patient is a 74 y old male who presents with a chief complaint of acute stroke and Parkinson's Syndrome with decline in function (24 Jan 2025 13:28)      HPI:    74 M PMH of Parkinson's, HTN, hx ischemic CVA x2, type 2 DM, dementia and chronic back pain, Hx of seizures on keppra BIBA from [facility] facility  or generalized weakness, confusion, decreased PO intake and multiple witnessed falls over the past few days. He had two witnessed falls the day prior to admission where he was weak and slumped to the ground. No LOC, not on AC. Per staff, he is normally alert and oriented x 3 and able to perform ADLs, but for the past few days has been increasingly confused. He was evaluated in the ED the day prior for similar complaints, had a negative work up and was discharged. Infectious and trauma workup negative. On 1/21 patient was notes to have expressive aphasia and R facial droop. Evaluated by neurology, MRI ordered and showed acute stroke in the L corona radiata. CTA Head/Neck showed severe stenosis left MCA proximal M2. Moderate narrowing right proximal vertebral artery On 1/22 new onset L tongue deviation and mild dysarthria - repeat MRI brain with no new acute findings. ILR placed 1/24.    Prior to admission, the patient was independent in ADLs and ambulation without an assistive device Patient now requires Reza for bed mobility, modA for transfers, ambulating 40’x2 with RW and modA,  modA for LBD. Therefore, there is a significant functional decline from baseline. Patients also with medical comorbidities of DM, HTN, requiring medication management and monitoring of vitals by Physiatry team and nursing. To return home it is in the patient’s best interest to have acute inpatient rehabilitative services to undergo intensive interdisciplinary therapy. Furthermore, the patient is motivated and is able to tolerate up to 3 hours of daily therapy for 5-6 days a week for a total of 15 hours a week. Evaluated by Physiatry and deemed to be an excellent candidate for admission to  for acute inpatient rehab. Admitted to Acute Rehab on 1/24/25    LS: Lives alone in a private home, 6STE  PLOF: independent in ADLs and ambulation with no AS     (24 Jan 2025 13:28)    TODAY'S SUBJECTIVE & REVIEW OF SYMPTOMS:  Patient was seen and examined at bedside this morning. No acute overnight events. No new complaints offered. Patient in good spirits and participating/tolerating in therapies well. Voiding spontaneously and has a regular BM. Vitals reviewed.    Patient is stable for discharge.     CLOF: Bed mobility supervision; Transfers sup/touchA-partialA; ambulation 150 ft x2/RW/touchA; UBD partialA; LBD partialA       Constitutional:    [   ] WNL           [   ] poor appetite   [   ] insomnia   [   ] tired   [ x] weak   Cardio:                [ x  ] WNL           [   ] CP   [   ] SY   [   ] palpitations               Resp:                   [ x  ] WNL           [   ] SOB   [   ] cough   [   ] wheezing   GI:                        [  x ] WNL           [   ] constipation   [   ] diarrhea   [   ] abdominal pain   [   ] nausea   [   ] emesis                                :                      [   x] WNL           [   ] LAM  [   ] dysuria   [   ] difficulty voiding             Endo:                   [  x ] WNL          [   ] polyuria   [   ] temperature intolerance                 Skin:                     [  x ] WNL          [   ] pain   [   ] wound   [   ] rash   MSK:                    [  x ] WNL          [   ] muscle pain   [   ] joint pain/ stiffness   [   ] muscle tenderness   [   ] swelling   Neuro:                 [ x  ] WNL          [   ] HA   [   ] change in vision   [   ] tremor   [   ] weakness   [   ]dysphagia              Cognitive:           [   ] WNL           [ x  ]confusion at times - feels 'off'   Psych:                  [   ] WNL           [   ] hallucinations   [   ]agitation   [   ] delusion   [ x  ]depression/ anxiety    PHYSICAL EXAM  Vital Signs Last 24 Hrs  T(C): 36.6 (02-12-25 @ 05:36), Max: 36.7 (02-11-25 @ 13:22)  T(F): 97.9 (02-12-25 @ 05:36), Max: 98.1 (02-11-25 @ 13:22)  HR: 61 (02-12-25 @ 06:07) (60 - 82)  BP: 136/76 (02-12-25 @ 06:07) (112/69 - 136/76)  BP(mean): --  RR: 18 (02-12-25 @ 05:36) (18 - 18)  SpO2: 99% (02-12-25 @ 05:36) (98% - 100%)      General:[x   ] NAD, Resting Comfortable,   [   ] other:                                HEENT: [ x  ] NC/AT, EOMI, PERRL , Normal Conjunctivae,   [   ] other:  Cardio: [  x ] RRR, no murmur   [   ] other:                              Pulm: [  x ] No Respiratory Distress,  Lungs CTAB,   [   ] other:                       Abdomen: [x   ]ND/NT, Soft,   [   ] other:    : [ x  ] NO LAM CATHETER, [   ] LAM CATHETER- no meatal tear, no discharge, [   ] other:                                            MSK: [x   ] No joint swelling, Full ROM,   [   ] other:                                         Ext: [  x ]No C/C/E, No calf tenderness,   [   ]other:    Skin: [ x  ]intact,   [x   ] other:       Ecchymosis noted on RUE along triceps region                                                             Neurological Examination:  Cognitive: [    ] AAO x 3,   [ x   ]  other: AOx2 - not place                                                                     Attention:  [  x  ] intact,   [  ]  other:                         Memory: [    ] intact,    [ x   ]  other:   2/3 delayed recall with verbal cues  Mood/Affect: [  x  ] wnl,    [    ]  other:                                                                             Communication: [    ]Fluent, no dysarthria, following commands:  [   x ] other: mild dysarthria, expressive aphasia, mild anomia, hypophonia   CN II - XII:  [    ] intact,  [ x   ] other: R facial droop                                                                                        Motor:   RIGHT UE: [   ] WNL,  [x   ] other: 4+/5  LEFT    UE: [  x ] WNL,  [   ] other:  RIGHT LE: [   ] WNL,  [x   ] other: 4+/5  LEFT    LE: [x   ] WNL,  [   ] other:    Tone: [  x ] wnl,   [    ]  other:  DTRs: [   ]symmetric, [ x  ] other: R patellar reflex brisk 3+  Coordination:   [x    ] intact,   [    ] other:                                                                           Sensory: [   x ] Intact to light touch,   [    ] other:    MEDICATIONS  (STANDING):  amLODIPine   Tablet 5 milliGRAM(s) Oral daily  aspirin  chewable 81 milliGRAM(s) Oral daily  atorvastatin 80 milliGRAM(s) Oral at bedtime  carbidopa/levodopa  25/100 2 Tablet(s) Oral three times a day  carbidopa/levodopa CR 50/200 1 Tablet(s) Oral <User Schedule>  carvedilol 3.125 milliGRAM(s) Oral every 12 hours  chlorhexidine 2% Cloths 1 Application(s) Topical <User Schedule>  clopidogrel Tablet 75 milliGRAM(s) Oral daily  dextrose 5%. 1000 milliLiter(s) (50 mL/Hr) IV Continuous <Continuous>  dextrose 5%. 1000 milliLiter(s) (100 mL/Hr) IV Continuous <Continuous>  dextrose 50% Injectable 25 Gram(s) IV Push once  enoxaparin Injectable 40 milliGRAM(s) SubCutaneous every 24 hours  entacapone 200 milliGRAM(s) Oral three times a day  glucagon  Injectable 1 milliGRAM(s) IntraMuscular once  guaifenesin/dextromethorphan Oral Liquid 10 milliLiter(s) Oral every 6 hours  insulin lispro (ADMELOG) corrective regimen sliding scale   SubCutaneous three times a day before meals  levETIRAcetam 500 milliGRAM(s) Oral two times a day  mirtazapine 7.5 milliGRAM(s) Oral at bedtime  polyethylene glycol 3350 17 Gram(s) Oral daily  rivastigmine patch  9.5 mG/24 Hr(s). 1 Patch Transdermal every 24 hours  senna 2 Tablet(s) Oral at bedtime  traZODone 50 milliGRAM(s) Oral at bedtime    MEDICATIONS  (PRN):  acetaminophen     Tablet .. 650 milliGRAM(s) Oral every 6 hours PRN Temp greater or equal to 38C (100.4F), Mild Pain (1 - 3)  aluminum hydroxide/magnesium hydroxide/simethicone Suspension 30 milliLiter(s) Oral every 4 hours PRN Dyspepsia  dextrose Oral Gel 15 Gram(s) Oral once PRN Blood Glucose LESS THAN 70 milliGRAM(s)/deciliter  magnesium hydroxide Suspension 30 milliLiter(s) Oral daily PRN Constipation  melatonin 3 milliGRAM(s) Oral at bedtime PRN Insomnia    RECENT LABS/IMAGING                                 14.1   8.54  )-----------( 152      ( 11 Feb 2025 06:14 )             41.6     02-10    140  |  108  |  18  ----------------------------<  85  4.6   |  25  |  0.9    Ca    8.7      10 Feb 2025 06:30  Mg     1.8     02-10    TPro  5.3[L]  /  Alb  3.3[L]  /  TBili  0.5  /  DBili  x   /  AST  34  /  ALT  9   /  AlkPhos  87  02-10      Urinalysis Basic - ( 10 Feb 2025 06:30 )    Color: x / Appearance: x / SG: x / pH: x  Gluc: 85 mg/dL / Ketone: x  / Bili: x / Urobili: x   Blood: x / Protein: x / Nitrite: x   Leuk Esterase: x / RBC: x / WBC x   Sq Epi: x / Non Sq Epi: x / Bacteria: x      POCT Blood Glucose.: 102 mg/dL (02-11-25 @ 07:15)  POCT Blood Glucose.: 173 mg/dL (02-10-25 @ 21:01)  POCT Blood Glucose.: 128 mg/dL (02-10-25 @ 15:59)  POCT Blood Glucose.: 148 mg/dL (02-10-25 @ 11:19)               13.5   8.21  )-----------( 148      ( 10 Feb 2025 06:30 )             39.7       Urinalysis Basic - ( 10 Feb 2025 06:30 )    Color: x / Appearance: x / SG: x / pH: x  Gluc: 85 mg/dL / Ketone: x  / Bili: x / Urobili: x   Blood: x / Protein: x / Nitrite: x   Leuk Esterase: x / RBC: x / WBC x   Sq Epi: x / Non Sq Epi: x / Bacteria: x      POCT Blood Glucose.: 112 mg/dL (02-10-25 @ 07:21)  POCT Blood Glucose.: 216 mg/dL (02-09-25 @ 21:04)  POCT Blood Glucose.: 100 mg/dL (02-09-25 @ 16:31)  POCT Blood Glucose.: 178 mg/dL (02-09-25 @ 11:36)  POCT Blood Glucose.: 85 mg/dL (02-09-25 @ 07:28)  POCT Blood Glucose.: 133 mg/dL (02-08-25 @ 21:16)  POCT Blood Glucose.: 152 mg/dL (02-08-25 @ 16:26)  POCT Blood Glucose.: 86 mg/dL (02-08-25 @ 11:33)  POCT Blood Glucose.: 150 mg/dL (02-08-25 @ 08:10)  POCT Blood Glucose.: 124 mg/dL (02-08-25 @ 05:50)  POCT Blood Glucose.: 139 mg/dL (02-07-25 @ 21:03)  POCT Blood Glucose.: 101 mg/dL (02-07-25 @ 16:40)  POCT Blood Glucose.: 134 mg/dL (02-07-25 @ 10:48)  POCT Blood Glucose.: 103 mg/dL (02-07-25 @ 07:27)  POCT Blood Glucose.: 192 mg/dL (02-06-25 @ 21:03)  POCT Blood Glucose.: 113 mg/dL (02-06-25 @ 16:35)                          15.4   9.34  )-----------( 173      ( 03 Feb 2025 06:27 )             45.3     02-03    142  |  105  |  17  ----------------------------<  74  4.7   |  23  |  1.1    Ca    8.7      03 Feb 2025 06:27  Mg     2.1     02-03    TPro  5.6[L]  /  Alb  3.7  /  TBili  0.7  /  DBili  x   /  AST  23  /  ALT  11  /  AlkPhos  93  02-03    CAPILLARY BLOOD GLUCOSE    POCT Blood Glucose.: 247 mg/dL (04 Feb 2025 16:12)  POCT Blood Glucose.: 201 mg/dL (04 Feb 2025 11:09)  POCT Blood Glucose.: 97 mg/dL (04 Feb 2025 07:24)  POCT Blood Glucose.: 111 mg/dL (03 Feb 2025 21:00)    POCT Blood Glucose.: 145 mg/dL (02-03-25 @ 11:47)  POCT Blood Glucose.: 106 mg/dL (02-03-25 @ 07:30)  POCT Blood Glucose.: 156 mg/dL (02-02-25 @ 21:52)  POCT Blood Glucose.: 128 mg/dL (02-02-25 @ 16:03)  POCT Blood Glucose.: 169 mg/dL (02-02-25 @ 11:19)  POCT Blood Glucose.: 139 mg/dL (02-02-25 @ 07:29)  POCT Blood Glucose.: 155 mg/dL (02-01-25 @ 21:01)  POCT Blood Glucose.: 95 mg/dL (02-01-25 @ 16:21)  POCT Blood Glucose.: 285 mg/dL (02-01-25 @ 11:27)  POCT Blood Glucose.: 99 mg/dL (02-01-25 @ 07:33)  POCT Blood Glucose.: 123 mg/dL (01-31-25 @ 20:46)  POCT Blood Glucose.: 102 mg/dL (01-31-25 @ 16:23)

## 2025-02-12 NOTE — PROGRESS NOTE ADULT - ASSESSMENT
Neuropsychology Follow-Up    The patient was evaluated at their bedside, and the patient's designated proxy was contacted via phone to discuss the results of the neuropsychological assessment and the patient’s progress. Recommendations were provided. Continuing care with psychotherapy will be beneficial for the patient. Both the patient and their proxy agreed with the recommendations and will contact the assisted living facility to arrange for psychotherapy sessions.

## 2025-02-14 DIAGNOSIS — R26.89 OTHER ABNORMALITIES OF GAIT AND MOBILITY: ICD-10-CM

## 2025-02-14 DIAGNOSIS — Z88.1 ALLERGY STATUS TO OTHER ANTIBIOTIC AGENTS: ICD-10-CM

## 2025-02-14 DIAGNOSIS — I69.322 DYSARTHRIA FOLLOWING CEREBRAL INFARCTION: ICD-10-CM

## 2025-02-14 DIAGNOSIS — M10.9 GOUT, UNSPECIFIED: ICD-10-CM

## 2025-02-14 DIAGNOSIS — E11.40 TYPE 2 DIABETES MELLITUS WITH DIABETIC NEUROPATHY, UNSPECIFIED: ICD-10-CM

## 2025-02-14 DIAGNOSIS — R29.704 NIHSS SCORE 4: ICD-10-CM

## 2025-02-14 DIAGNOSIS — E78.5 HYPERLIPIDEMIA, UNSPECIFIED: ICD-10-CM

## 2025-02-14 DIAGNOSIS — I69.320 APHASIA FOLLOWING CEREBRAL INFARCTION: ICD-10-CM

## 2025-02-14 DIAGNOSIS — R41.0 DISORIENTATION, UNSPECIFIED: ICD-10-CM

## 2025-02-14 DIAGNOSIS — G40.909 EPILEPSY, UNSPECIFIED, NOT INTRACTABLE, WITHOUT STATUS EPILEPTICUS: ICD-10-CM

## 2025-02-14 DIAGNOSIS — Z88.0 ALLERGY STATUS TO PENICILLIN: ICD-10-CM

## 2025-02-14 DIAGNOSIS — Z79.02 LONG TERM (CURRENT) USE OF ANTITHROMBOTICS/ANTIPLATELETS: ICD-10-CM

## 2025-02-14 DIAGNOSIS — I69.398 OTHER SEQUELAE OF CEREBRAL INFARCTION: ICD-10-CM

## 2025-02-14 DIAGNOSIS — F32.A DEPRESSION, UNSPECIFIED: ICD-10-CM

## 2025-02-14 DIAGNOSIS — Z95.818 PRESENCE OF OTHER CARDIAC IMPLANTS AND GRAFTS: ICD-10-CM

## 2025-02-14 DIAGNOSIS — J02.9 ACUTE PHARYNGITIS, UNSPECIFIED: ICD-10-CM

## 2025-02-14 DIAGNOSIS — Z79.82 LONG TERM (CURRENT) USE OF ASPIRIN: ICD-10-CM

## 2025-02-14 DIAGNOSIS — R29.6 REPEATED FALLS: ICD-10-CM

## 2025-02-14 DIAGNOSIS — Z79.899 OTHER LONG TERM (CURRENT) DRUG THERAPY: ICD-10-CM

## 2025-02-14 DIAGNOSIS — D64.9 ANEMIA, UNSPECIFIED: ICD-10-CM

## 2025-02-14 DIAGNOSIS — F02.A0 DEMENTIA IN OTHER DISEASES CLASSIFIED ELSEWHERE, MILD, WITHOUT BEHAVIORAL DISTURBANCE, PSYCHOTIC DISTURBANCE, MOOD DISTURBANCE, AND ANXIETY: ICD-10-CM

## 2025-02-14 DIAGNOSIS — I10 ESSENTIAL (PRIMARY) HYPERTENSION: ICD-10-CM

## 2025-02-14 DIAGNOSIS — G20.C PARKINSONISM, UNSPECIFIED: ICD-10-CM

## 2025-02-14 DIAGNOSIS — G89.29 OTHER CHRONIC PAIN: ICD-10-CM

## 2025-02-14 DIAGNOSIS — Z88.2 ALLERGY STATUS TO SULFONAMIDES: ICD-10-CM

## 2025-02-14 DIAGNOSIS — T43.215A ADVERSE EFFECT OF SELECTIVE SEROTONIN AND NOREPINEPHRINE REUPTAKE INHIBITORS, INITIAL ENCOUNTER: ICD-10-CM

## 2025-02-19 ENCOUNTER — APPOINTMENT (OUTPATIENT)
Dept: CARDIOLOGY | Facility: CLINIC | Age: 75
End: 2025-02-19

## 2025-02-19 ENCOUNTER — NON-APPOINTMENT (OUTPATIENT)
Age: 75
End: 2025-02-19

## 2025-02-19 VITALS
WEIGHT: 185 LBS | HEART RATE: 57 BPM | SYSTOLIC BLOOD PRESSURE: 145 MMHG | BODY MASS INDEX: 28.04 KG/M2 | DIASTOLIC BLOOD PRESSURE: 85 MMHG | HEIGHT: 68 IN

## 2025-02-19 DIAGNOSIS — G20.A1 PARKINSON'S DISEASE WITHOUT DYSKINESIA, WITHOUT MENTION OF FLUCTUATIONS: ICD-10-CM

## 2025-02-19 DIAGNOSIS — I63.9 CEREBRAL INFARCTION, UNSPECIFIED: ICD-10-CM

## 2025-02-19 PROCEDURE — 99213 OFFICE O/P EST LOW 20 MIN: CPT

## 2025-02-28 PROBLEM — G20.A1 PARKINSON DISEASE: Status: ACTIVE | Noted: 2019-10-17

## 2025-02-28 PROBLEM — I63.9 CEREBROVASCULAR ACCIDENT (CVA): Status: ACTIVE | Noted: 2024-04-08

## 2025-03-17 ENCOUNTER — APPOINTMENT (OUTPATIENT)
Dept: CARDIOLOGY | Facility: CLINIC | Age: 75
End: 2025-03-17

## 2025-03-20 ENCOUNTER — APPOINTMENT (OUTPATIENT)
Dept: CARDIOLOGY | Facility: CLINIC | Age: 75
End: 2025-03-20

## 2025-03-20 PROCEDURE — 93298 REM INTERROG DEV EVAL SCRMS: CPT

## 2025-04-14 ENCOUNTER — NON-APPOINTMENT (OUTPATIENT)
Age: 75
End: 2025-04-14

## 2025-04-14 ENCOUNTER — APPOINTMENT (OUTPATIENT)
Dept: NEUROLOGY | Facility: CLINIC | Age: 75
End: 2025-04-14
Payer: MEDICARE

## 2025-04-14 VITALS
OXYGEN SATURATION: 98 % | HEIGHT: 68 IN | BODY MASS INDEX: 23.49 KG/M2 | DIASTOLIC BLOOD PRESSURE: 79 MMHG | HEART RATE: 70 BPM | WEIGHT: 155 LBS | SYSTOLIC BLOOD PRESSURE: 136 MMHG

## 2025-04-14 DIAGNOSIS — I63.9 CEREBRAL INFARCTION, UNSPECIFIED: ICD-10-CM

## 2025-04-14 PROCEDURE — 99215 OFFICE O/P EST HI 40 MIN: CPT

## 2025-04-14 RX ORDER — ATORVASTATIN CALCIUM 80 MG/1
80 TABLET, FILM COATED ORAL DAILY
Refills: 0 | Status: ACTIVE | COMMUNITY

## 2025-04-14 RX ORDER — AMLODIPINE BESYLATE 5 MG/1
5 TABLET ORAL DAILY
Refills: 0 | Status: ACTIVE | COMMUNITY

## 2025-04-14 RX ORDER — CARBIDOPA AND LEVODOPA 50; 200 MG/1; MG/1
50-200 TABLET, EXTENDED RELEASE ORAL
Refills: 0 | Status: DISCONTINUED | COMMUNITY
End: 2025-04-14

## 2025-04-14 RX ORDER — CLOPIDOGREL BISULFATE 75 MG/1
75 TABLET, FILM COATED ORAL DAILY
Qty: 90 | Refills: 3 | Status: ACTIVE | COMMUNITY
Start: 2025-04-14 | End: 1900-01-01

## 2025-04-14 RX ORDER — ENTACAPONE 200 MG/1
200 TABLET, FILM COATED ORAL 3 TIMES DAILY
Refills: 0 | Status: ACTIVE | COMMUNITY

## 2025-04-14 RX ORDER — SENNOSIDES, DOCUSATE SODIUM 50; 8.6 MG/1; MG/1
8.6-5 TABLET, FILM COATED ORAL
Refills: 0 | Status: ACTIVE | COMMUNITY

## 2025-04-14 RX ORDER — CARVEDILOL 3.12 MG/1
3.12 TABLET, FILM COATED ORAL TWICE DAILY
Refills: 0 | Status: ACTIVE | COMMUNITY

## 2025-04-14 RX ORDER — CARBIDOPA AND LEVODOPA 25; 100 MG/1; MG/1
25-100 TABLET ORAL 3 TIMES DAILY
Refills: 0 | Status: ACTIVE | COMMUNITY

## 2025-04-14 RX ORDER — GLUCOSAMINE HCL/CHONDROITIN SU 500-400 MG
3 CAPSULE ORAL
Refills: 0 | Status: ACTIVE | COMMUNITY

## 2025-04-14 RX ORDER — MIRTAZAPINE 7.5 MG/1
7.5 TABLET, FILM COATED ORAL
Refills: 0 | Status: ACTIVE | COMMUNITY

## 2025-04-14 RX ORDER — TRAZODONE HYDROCHLORIDE 50 MG/1
50 TABLET ORAL DAILY
Refills: 0 | Status: ACTIVE | COMMUNITY

## 2025-04-23 ENCOUNTER — APPOINTMENT (OUTPATIENT)
Dept: CARDIOLOGY | Facility: CLINIC | Age: 75
End: 2025-04-23
Payer: MEDICARE

## 2025-04-23 ENCOUNTER — NON-APPOINTMENT (OUTPATIENT)
Age: 75
End: 2025-04-23

## 2025-04-23 ENCOUNTER — INPATIENT (INPATIENT)
Facility: HOSPITAL | Age: 75
LOS: 7 days | Discharge: HOME | DRG: 66 | End: 2025-05-01
Attending: PSYCHIATRY & NEUROLOGY | Admitting: NEUROLOGICAL SURGERY
Payer: MEDICARE

## 2025-04-23 VITALS
HEART RATE: 91 BPM | DIASTOLIC BLOOD PRESSURE: 82 MMHG | SYSTOLIC BLOOD PRESSURE: 148 MMHG | WEIGHT: 145.95 LBS | RESPIRATION RATE: 18 BRPM | HEIGHT: 66 IN | TEMPERATURE: 98 F | OXYGEN SATURATION: 98 %

## 2025-04-23 DIAGNOSIS — I63.9 CEREBRAL INFARCTION, UNSPECIFIED: ICD-10-CM

## 2025-04-23 DIAGNOSIS — Z90.49 ACQUIRED ABSENCE OF OTHER SPECIFIED PARTS OF DIGESTIVE TRACT: Chronic | ICD-10-CM

## 2025-04-23 LAB
ALBUMIN SERPL ELPH-MCNC: 4.3 G/DL — SIGNIFICANT CHANGE UP (ref 3.5–5.2)
ALP SERPL-CCNC: 98 U/L — SIGNIFICANT CHANGE UP (ref 30–115)
ALT FLD-CCNC: 18 U/L — SIGNIFICANT CHANGE UP (ref 0–41)
ANION GAP SERPL CALC-SCNC: 11 MMOL/L — SIGNIFICANT CHANGE UP (ref 7–14)
APTT BLD: 31.1 SEC — SIGNIFICANT CHANGE UP (ref 27–39.2)
AST SERPL-CCNC: 71 U/L — HIGH (ref 0–41)
BASOPHILS # BLD AUTO: 0.02 K/UL — SIGNIFICANT CHANGE UP (ref 0–0.2)
BASOPHILS NFR BLD AUTO: 0.3 % — SIGNIFICANT CHANGE UP (ref 0–1)
BILIRUB SERPL-MCNC: 1.1 MG/DL — SIGNIFICANT CHANGE UP (ref 0.2–1.2)
BUN SERPL-MCNC: 18 MG/DL — SIGNIFICANT CHANGE UP (ref 10–20)
CALCIUM SERPL-MCNC: 9.4 MG/DL — SIGNIFICANT CHANGE UP (ref 8.4–10.5)
CHLORIDE SERPL-SCNC: 105 MMOL/L — SIGNIFICANT CHANGE UP (ref 98–110)
CO2 SERPL-SCNC: 25 MMOL/L — SIGNIFICANT CHANGE UP (ref 17–32)
CREAT SERPL-MCNC: 1 MG/DL — SIGNIFICANT CHANGE UP (ref 0.7–1.5)
EGFR: 80 ML/MIN/1.73M2 — SIGNIFICANT CHANGE UP
EGFR: 80 ML/MIN/1.73M2 — SIGNIFICANT CHANGE UP
EOSINOPHIL # BLD AUTO: 0.14 K/UL — SIGNIFICANT CHANGE UP (ref 0–0.7)
EOSINOPHIL NFR BLD AUTO: 2.1 % — SIGNIFICANT CHANGE UP (ref 0–8)
GLUCOSE BLDC GLUCOMTR-MCNC: 153 MG/DL — HIGH (ref 70–99)
GLUCOSE BLDC GLUCOMTR-MCNC: 99 MG/DL — SIGNIFICANT CHANGE UP (ref 70–99)
GLUCOSE SERPL-MCNC: 79 MG/DL — SIGNIFICANT CHANGE UP (ref 70–99)
HCT VFR BLD CALC: 31.6 % — LOW (ref 42–52)
HGB BLD-MCNC: 10.9 G/DL — LOW (ref 14–18)
IMM GRANULOCYTES NFR BLD AUTO: 0.3 % — SIGNIFICANT CHANGE UP (ref 0.1–0.3)
INR BLD: 0.98 RATIO — SIGNIFICANT CHANGE UP (ref 0.65–1.3)
LYMPHOCYTES # BLD AUTO: 1.85 K/UL — SIGNIFICANT CHANGE UP (ref 1.2–3.4)
LYMPHOCYTES # BLD AUTO: 27.5 % — SIGNIFICANT CHANGE UP (ref 20.5–51.1)
MCHC RBC-ENTMCNC: 34 PG — HIGH (ref 27–31)
MCHC RBC-ENTMCNC: 34.5 G/DL — SIGNIFICANT CHANGE UP (ref 32–37)
MCV RBC AUTO: 98.4 FL — HIGH (ref 80–94)
MONOCYTES # BLD AUTO: 0.47 K/UL — SIGNIFICANT CHANGE UP (ref 0.1–0.6)
MONOCYTES NFR BLD AUTO: 7 % — SIGNIFICANT CHANGE UP (ref 1.7–9.3)
NEUTROPHILS # BLD AUTO: 4.23 K/UL — SIGNIFICANT CHANGE UP (ref 1.4–6.5)
NEUTROPHILS NFR BLD AUTO: 62.8 % — SIGNIFICANT CHANGE UP (ref 42.2–75.2)
NRBC BLD AUTO-RTO: 0 /100 WBCS — SIGNIFICANT CHANGE UP (ref 0–0)
PLATELET # BLD AUTO: 107 K/UL — LOW (ref 130–400)
PMV BLD: 9.4 FL — SIGNIFICANT CHANGE UP (ref 7.4–10.4)
POTASSIUM SERPL-MCNC: 4.3 MMOL/L — SIGNIFICANT CHANGE UP (ref 3.5–5)
POTASSIUM SERPL-SCNC: 4.3 MMOL/L — SIGNIFICANT CHANGE UP (ref 3.5–5)
PROT SERPL-MCNC: 7 G/DL — SIGNIFICANT CHANGE UP (ref 6–8)
PROTHROM AB SERPL-ACNC: 11.5 SEC — SIGNIFICANT CHANGE UP (ref 9.95–12.87)
RBC # BLD: 3.21 M/UL — LOW (ref 4.7–6.1)
RBC # FLD: 12.2 % — SIGNIFICANT CHANGE UP (ref 11.5–14.5)
SODIUM SERPL-SCNC: 141 MMOL/L — SIGNIFICANT CHANGE UP (ref 135–146)
TROPONIN T, HIGH SENSITIVITY RESULT: 14 NG/L — SIGNIFICANT CHANGE UP (ref 6–21)
WBC # BLD: 6.73 K/UL — SIGNIFICANT CHANGE UP (ref 4.8–10.8)
WBC # FLD AUTO: 6.73 K/UL — SIGNIFICANT CHANGE UP (ref 4.8–10.8)

## 2025-04-23 PROCEDURE — 36224 PLACE CATH CAROTD ART: CPT | Mod: XS,RT

## 2025-04-23 PROCEDURE — 99223 1ST HOSP IP/OBS HIGH 75: CPT

## 2025-04-23 PROCEDURE — C1894: CPT

## 2025-04-23 PROCEDURE — 87640 STAPH A DNA AMP PROBE: CPT

## 2025-04-23 PROCEDURE — 36224 PLACE CATH CAROTD ART: CPT | Mod: 50,59

## 2025-04-23 PROCEDURE — 93970 EXTREMITY STUDY: CPT

## 2025-04-23 PROCEDURE — 97535 SELF CARE MNGMENT TRAINING: CPT | Mod: GO

## 2025-04-23 PROCEDURE — 97163 PT EVAL HIGH COMPLEX 45 MIN: CPT | Mod: GP

## 2025-04-23 PROCEDURE — 36226 PLACE CATH VERTEBRAL ART: CPT | Mod: RT,59

## 2025-04-23 PROCEDURE — C1769: CPT

## 2025-04-23 PROCEDURE — 97129 THER IVNTJ 1ST 15 MIN: CPT | Mod: GO

## 2025-04-23 PROCEDURE — 94760 N-INVAS EAR/PLS OXIMETRY 1: CPT

## 2025-04-23 PROCEDURE — 99291 CRITICAL CARE FIRST HOUR: CPT | Mod: FS

## 2025-04-23 PROCEDURE — 84100 ASSAY OF PHOSPHORUS: CPT

## 2025-04-23 PROCEDURE — 92526 ORAL FUNCTION THERAPY: CPT | Mod: GN

## 2025-04-23 PROCEDURE — 36226 PLACE CATH VERTEBRAL ART: CPT | Mod: XS,RT

## 2025-04-23 PROCEDURE — 87641 MR-STAPH DNA AMP PROBE: CPT

## 2025-04-23 PROCEDURE — 92523 SPEECH SOUND LANG COMPREHEN: CPT | Mod: GN

## 2025-04-23 PROCEDURE — 80061 LIPID PANEL: CPT

## 2025-04-23 PROCEDURE — 70496 CT ANGIOGRAPHY HEAD: CPT | Mod: 26

## 2025-04-23 PROCEDURE — 80053 COMPREHEN METABOLIC PANEL: CPT

## 2025-04-23 PROCEDURE — 61650 EVASC PRLNG ADMN RX AGNT 1ST: CPT

## 2025-04-23 PROCEDURE — C1887: CPT

## 2025-04-23 PROCEDURE — 76937 US GUIDE VASCULAR ACCESS: CPT

## 2025-04-23 PROCEDURE — 81001 URINALYSIS AUTO W/SCOPE: CPT

## 2025-04-23 PROCEDURE — 85025 COMPLETE CBC W/AUTO DIFF WBC: CPT

## 2025-04-23 PROCEDURE — 97116 GAIT TRAINING THERAPY: CPT | Mod: GP

## 2025-04-23 PROCEDURE — 97167 OT EVAL HIGH COMPLEX 60 MIN: CPT | Mod: GO

## 2025-04-23 PROCEDURE — 97530 THERAPEUTIC ACTIVITIES: CPT | Mod: GP

## 2025-04-23 PROCEDURE — 36415 COLL VENOUS BLD VENIPUNCTURE: CPT

## 2025-04-23 PROCEDURE — 84443 ASSAY THYROID STIM HORMONE: CPT

## 2025-04-23 PROCEDURE — 0042T: CPT | Mod: MC

## 2025-04-23 PROCEDURE — C9399: CPT

## 2025-04-23 PROCEDURE — 85027 COMPLETE CBC AUTOMATED: CPT

## 2025-04-23 PROCEDURE — 76377 3D RENDER W/INTRP POSTPROCES: CPT | Mod: 26

## 2025-04-23 PROCEDURE — 70498 CT ANGIOGRAPHY NECK: CPT | Mod: 26

## 2025-04-23 PROCEDURE — 93306 TTE W/DOPPLER COMPLETE: CPT

## 2025-04-23 PROCEDURE — 99223 1ST HOSP IP/OBS HIGH 75: CPT | Mod: FS

## 2025-04-23 PROCEDURE — 70450 CT HEAD/BRAIN W/O DYE: CPT | Mod: MC

## 2025-04-23 PROCEDURE — 93290 INTERROG DEV EVAL ICPMS IP: CPT

## 2025-04-23 PROCEDURE — 70496 CT ANGIOGRAPHY HEAD: CPT | Mod: MC

## 2025-04-23 PROCEDURE — 83036 HEMOGLOBIN GLYCOSYLATED A1C: CPT

## 2025-04-23 PROCEDURE — 93298 REM INTERROG DEV EVAL SCRMS: CPT

## 2025-04-23 PROCEDURE — 95700 EEG CONT REC W/VID EEG TECH: CPT

## 2025-04-23 PROCEDURE — 0042T: CPT

## 2025-04-23 PROCEDURE — 70551 MRI BRAIN STEM W/O DYE: CPT | Mod: MC

## 2025-04-23 PROCEDURE — 85576 BLOOD PLATELET AGGREGATION: CPT

## 2025-04-23 PROCEDURE — 83735 ASSAY OF MAGNESIUM: CPT

## 2025-04-23 PROCEDURE — 92610 EVALUATE SWALLOWING FUNCTION: CPT | Mod: GN

## 2025-04-23 PROCEDURE — 76937 US GUIDE VASCULAR ACCESS: CPT | Mod: 26

## 2025-04-23 PROCEDURE — 97112 NEUROMUSCULAR REEDUCATION: CPT | Mod: GO

## 2025-04-23 PROCEDURE — 84484 ASSAY OF TROPONIN QUANT: CPT

## 2025-04-23 PROCEDURE — 82962 GLUCOSE BLOOD TEST: CPT

## 2025-04-23 PROCEDURE — 70498 CT ANGIOGRAPHY NECK: CPT | Mod: MC

## 2025-04-23 PROCEDURE — 95714 VEEG EA 12-26 HR UNMNTR: CPT

## 2025-04-23 PROCEDURE — 92507 TX SP LANG VOICE COMM INDIV: CPT | Mod: GN

## 2025-04-23 PROCEDURE — 70450 CT HEAD/BRAIN W/O DYE: CPT | Mod: 26,XU

## 2025-04-23 RX ORDER — DEXTROSE 50 % IN WATER 50 %
25 SYRINGE (ML) INTRAVENOUS ONCE
Refills: 0 | Status: DISCONTINUED | OUTPATIENT
Start: 2025-04-23 | End: 2025-04-28

## 2025-04-23 RX ORDER — INSULIN LISPRO 100 U/ML
INJECTION, SOLUTION INTRAVENOUS; SUBCUTANEOUS EVERY 6 HOURS
Refills: 0 | Status: DISCONTINUED | OUTPATIENT
Start: 2025-04-23 | End: 2025-04-24

## 2025-04-23 RX ORDER — POLYETHYLENE GLYCOL 3350 17 G/17G
17 POWDER, FOR SOLUTION ORAL DAILY
Refills: 0 | Status: DISCONTINUED | OUTPATIENT
Start: 2025-04-23 | End: 2025-04-25

## 2025-04-23 RX ORDER — ASPIRIN 325 MG
81 TABLET ORAL DAILY
Refills: 0 | Status: DISCONTINUED | OUTPATIENT
Start: 2025-04-23 | End: 2025-05-01

## 2025-04-23 RX ORDER — ATORVASTATIN CALCIUM 80 MG/1
80 TABLET, FILM COATED ORAL AT BEDTIME
Refills: 0 | Status: DISCONTINUED | OUTPATIENT
Start: 2025-04-23 | End: 2025-05-01

## 2025-04-23 RX ORDER — ACETAMINOPHEN 500 MG/5ML
650 LIQUID (ML) ORAL EVERY 6 HOURS
Refills: 0 | Status: DISCONTINUED | OUTPATIENT
Start: 2025-04-23 | End: 2025-05-01

## 2025-04-23 RX ORDER — GLUCAGON 3 MG/1
1 POWDER NASAL ONCE
Refills: 0 | Status: DISCONTINUED | OUTPATIENT
Start: 2025-04-23 | End: 2025-04-28

## 2025-04-23 RX ORDER — NOREPINEPHRINE BITARTRATE 8 MG
0.05 SOLUTION INTRAVENOUS
Qty: 8 | Refills: 0 | Status: DISCONTINUED | OUTPATIENT
Start: 2025-04-23 | End: 2025-04-24

## 2025-04-23 RX ORDER — SODIUM CHLORIDE 9 G/1000ML
1000 INJECTION, SOLUTION INTRAVENOUS
Refills: 0 | Status: DISCONTINUED | OUTPATIENT
Start: 2025-04-23 | End: 2025-04-28

## 2025-04-23 RX ORDER — NOREPINEPHRINE BITARTRATE 8 MG
0.05 SOLUTION INTRAVENOUS
Qty: 8 | Refills: 0 | Status: DISCONTINUED | OUTPATIENT
Start: 2025-04-23 | End: 2025-04-23

## 2025-04-23 RX ORDER — SENNA 187 MG
2 TABLET ORAL AT BEDTIME
Refills: 0 | Status: DISCONTINUED | OUTPATIENT
Start: 2025-04-23 | End: 2025-05-01

## 2025-04-23 RX ORDER — ASPIRIN 325 MG
300 TABLET ORAL DAILY
Refills: 0 | Status: DISCONTINUED | OUTPATIENT
Start: 2025-04-23 | End: 2025-04-23

## 2025-04-23 RX ORDER — CARBIDOPA/LEVODOPA 25MG-100MG
1 TABLET ORAL
Refills: 0 | Status: DISCONTINUED | OUTPATIENT
Start: 2025-04-23 | End: 2025-05-01

## 2025-04-23 RX ORDER — DEXTROSE 50 % IN WATER 50 %
12.5 SYRINGE (ML) INTRAVENOUS ONCE
Refills: 0 | Status: DISCONTINUED | OUTPATIENT
Start: 2025-04-23 | End: 2025-04-28

## 2025-04-23 RX ORDER — DEXTROSE 50 % IN WATER 50 %
15 SYRINGE (ML) INTRAVENOUS ONCE
Refills: 0 | Status: DISCONTINUED | OUTPATIENT
Start: 2025-04-23 | End: 2025-04-28

## 2025-04-23 RX ADMIN — Medication 2 TABLET(S): at 22:07

## 2025-04-23 RX ADMIN — NOREPINEPHRINE BITARTRATE 6.16 MICROGRAM(S)/KG/MIN: 8 SOLUTION at 17:25

## 2025-04-23 RX ADMIN — Medication 1 TABLET(S): at 22:07

## 2025-04-23 RX ADMIN — INSULIN LISPRO 2: 100 INJECTION, SOLUTION INTRAVENOUS; SUBCUTANEOUS at 23:48

## 2025-04-23 RX ADMIN — Medication 75 MILLILITER(S): at 16:00

## 2025-04-23 RX ADMIN — Medication 81 MILLIGRAM(S): at 22:07

## 2025-04-23 RX ADMIN — ATORVASTATIN CALCIUM 80 MILLIGRAM(S): 80 TABLET, FILM COATED ORAL at 22:06

## 2025-04-23 NOTE — STROKE CODE NOTE - NIH STROKE SCALE: 4. FACIAL PALSY, QM
(0) Normal symmetrical movements (1) Minor paralysis (flattened nasolabial fold, asymmetry on smiling) (2) Partial paralysis (total or near-total paralysis of lower face)

## 2025-04-23 NOTE — H&P ADULT - ASSESSMENT
74M CVA with severe ICAD vs. vasospasm s/p IA verapamil    #CVA    Plan:  #Neuro  -q1 NIH  -c/w ASA 81  -lipitor 80mg qd  -CTH AM  -analgesia: tylenol prn  -stroke core measures  -PT/OT/Speech    #Pulm  -SpO2 >94%  -IS    #CV  --190  -levo gtt prn  -hold home anti-hypertensives  -ekg - P  -TTE - P    #GI  -NPO pending dysphagia screening  -bowel regimen: senna/miralax  -LBM - PTA    #/Renal  normonatremic 135-145  -K >4, Mg >2  -replete lytes as needed    #Endo  -euglycemia 140-180  -A1c - P  -LDL - P  -TG - P  -TSH - P    #Heme  -SCDs  -LE duplex  - P    #ID  -afebrile, normothermic   74M CVA with severe ICAD vs. vasospasm s/p IA verapamil    #CVA    Plan:  #Neuro  -q1 NIH  -c/w ASA 81  -lipitor 80mg qd  -CTH AM  -c/w carbidopa/levodopa  -analgesia: tylenol prn  -stroke core measures  -PT/OT/Speech    #Pulm  -SpO2 >94%  -IS    #CV  --190  -levo gtt prn  -hold home anti-hypertensives  -ekg - P  -TTE - P    #GI  -NPO pending dysphagia screening  -bowel regimen: senna/miralax  -LBM - PTA    #/Renal  normonatremic 135-145  -K >4, Mg >2  -replete lytes as needed    #Endo  -euglycemia 140-180  -A1c - P  -LDL - P  -TG - P  -TSH - P    #Heme  -SCDs  -LE duplex  - P    #ID  -afebrile, normothermic   74M CVA with severe ICAD vs. vasospasm s/p IA verapamil    #CVA    Plan:  #Neuro  -q1 NIH  -c/w ASA 81  -lipitor 80mg qd  -CTH AM  -c/w carbidopa/levodopa  -analgesia: tylenol prn  -stroke core measures  -PT/OT/Speech    #Pulm  -SpO2 >94%  -IS    #CV  --190  -levo gtt prn  -hold home anti-hypertensives  -ekg - P  -TTE - P    #GI  -NPO pending dysphagia screening  -bowel regimen: senna/miralax  -LBM - PTA    #/Renal  normonatremic 135-145  -K >4, Mg >2  -replete lytes as needed    #Endo  -euglycemia 140-180  -mod ISS  -A1c - P  -LDL - P  -TG - P  -TSH - P    #Heme  -SCDs  -LE duplex  - P    #ID  -afebrile, normothermic   74M CVA with severe ICAD vs. vasospasm s/p IA verapamil    #CVA    Plan:  #Neuro  -q1 NIH  -c/w ASA 81  -lipitor 80mg qd  -CTH AM  -c/w carbidopa/levodopa 50/200 CR QD  -c/w keppra 500mg BID  -analgesia: tylenol prn  -stroke core measures  -PT/OT/Speech    #Pulm  -SpO2 >94%  -IS    #CV  --190  -levo gtt prn  -hold home anti-hypertensives  -ekg - P  -TTE - P    #GI  -NPO pending dysphagia screening  -bowel regimen: senna/miralax  -LBM - PTA    #/Renal  normonatremic 135-145  -K >4, Mg >2  -replete lytes as needed    #Endo  -euglycemia 140-180  -mod ISS  -A1c - P  -LDL - P  -TG - P  -TSH - P    #Heme  -SCDs  -LE duplex  - P    #ID  -afebrile, normothermic

## 2025-04-23 NOTE — PATIENT PROFILE ADULT - FUNCTIONAL ASSESSMENT - BASIC MOBILITY 6.
2-calculated by average/Not able to assess (calculate score using [facility] averaging method)

## 2025-04-23 NOTE — H&P ADULT - NSHPPHYSICALEXAM_GEN_ALL_CORE
PHYSICAL EXAM:  GENERAL: well built, well nourished  NEURO: A&Ox1, PERRL, EOMI, midline gaze, no visual field cut, severe expressive aphasia, mild dysarthria, face symmetric, BL UE no drift, BL LE no drift, sensation intact, no ataxia  CHEST/LUNG: B/L good air entry; No rales, rhonchi, or wheezing  HEART: S1S2 normal, no S3, Regular rate and rhythm; No murmurs, rubs, or gallops  ABDOMEN: Soft, Nontender, Nondistended; Bowel sounds present  EXTREMITIES:  2+ Peripheral Pulses, No clubbing, cyanosis, or edema PHYSICAL EXAM:  GENERAL: well built, well nourished  NEURO: A&Ox1, PERRL, EOMI, midline gaze, no visual field cut, severe expressive aphasia, mild dysarthria, R facial droop, BL UE no drift, BL LE no drift, sensation intact, no ataxia  CHEST/LUNG: B/L good air entry; No rales, rhonchi, or wheezing  HEART: S1S2 normal, no S3, Regular rate and rhythm; No murmurs, rubs, or gallops  ABDOMEN: Soft, Nontender, Nondistended; Bowel sounds present  EXTREMITIES:  2+ Peripheral Pulses, No clubbing, cyanosis, or edema

## 2025-04-23 NOTE — ED PROVIDER NOTE - ATTENDING APP SHARED VISIT CONTRIBUTION OF CARE
75 yo M with hx of stroke x2, parkinsons, DM, dementia, chronic back pain, seizures on keppra, anxiety, depression, insomnia, HTN, anema who was BIBEMS from [facility] ***    Normally A&Ox3 and able to do ADLs independently. Per chart review, pt was admitted 1/16-2/12 for acute L corona radiata stroke and was sent to rehab for gait abnormality, dysarthria, expressive aphasia. I personally saw the patient. MIRNA Capone and I provided critical care for a total of 50 minutes. I provided a substantive portion of the care and the majority of the critical care time.    75 yo M with hx of stroke x2, parkinsons, DM, dementia, chronic back pain, seizures on keppra, anxiety, depression, insomnia, HTN, anemia who was BIBEMS from [facility] for acute onset of ams. Per EMS, pt suddenly became aphasic at 12:30pm. Normally A&Ox3 and able to do ADLs independently. Per chart review, pt was admitted 1/16-2/12 for acute L corona radiata stroke and was sent to rehab for gait abnormality, dysarthria, expressive aphasia. Per family, pt has some residual expressive aphasia afer prior stroke but was able to speak afterward. Yesterday was talking normally on the phone but last night started having difficulty speaking. Per pt, nods his head yes/no to questions and follows commands. On ASA/plavix.    CONSTITUTIONAL: well developed, in no acute distress, nontoxic appearing  SKIN: warm, dry, no rash  HEAD: normocephalic, atraumatic  EYES: PERRL at 3mm, EOMI, no conjunctival erythema, no spontaneous nystagmus, no provoked nystagmus   ENT: patent airway, moist mucous membranes, no tongue deviation  NECK: supple, no masses, full flexion/extension without pain  CV:  regular rate, regular rhythm, 2+ radial pulses bilaterally  RESP: no wheezes, no rales, no rhonchi, normal work of breathing  ABD: soft, no tenderness, nondistended, no rebound, no guarding  MSK: no cyanosis, no edema  NEURO: alert, CN 2-12 grossly intact, sensation intact to light touch symmetrically, 5/5 motor strength in all extremities, no pronator drift, no facial asymmetry  PSYCH: cooperative, appropriate     A&P:  Pt here with acute onset of phasia. Vitals reassuring in ED. Pt only able to say "yeah" once and otherwise follows commands. Stroke code activated in triage. Plan for labs, ekg, imaging r/o ICH, ischemic stroke, TIA, arrhythmia.

## 2025-04-23 NOTE — STROKE CODE NOTE - NSMDCONSULT QTN_Y FT
IV-tenecetplase(Y/N):    NO                      Reason IV-tenecetplase not given: out of window     *********Thrombectomy was discussed between Neurology and Neuro IR Attendings and decision was made to proceed ___(who consented pt/bryant/Abhijit LUNA)__ consented for thrombectomy after discussion of risk and benefits of procedure.    *****NI alert time: 14:02  *****NI actual/decision time: 14:09     75 y/o M with PMHx parkinsons, HTN, DM, gout, seizures on keppra, L bhatt radiate stroke in Janaury 2025 on ASA 81mg and residual expressive aphasia/dysarthria/R facial droop, presenting to the ED with worsening word finding difficulty, now unable to speak at all. LKW 9:30pm yesterday, per family member, pt was speaking to her on the phone yesterday speaking in full sentences when he suddenly began to speak one word at a time. This morning, a different family member was s IV-tenecetplase(Y/N):    NO                      Reason IV-tenecetplase not given: out of window     *********Thrombectomy was discussed between Neurology and Neuro IR Attendings and decision was made to proceed ___(who consented pt/fam/2 MD)__ consented for thrombectomy after discussion of risk and benefits of procedure.    *****NI alert time: 14:02  *****NI actual/decision time: 14:09     75 y/o M with PMHx of parkinsons, HTN, DM, gout, seizures on keppra, L bhatt radiate stroke in January 2025 on ASA 81mg and residual expressive aphasia/dysarthria/R facial droop, presenting to the ED with worsening word finding difficulty, now unable to speak at all. LKW 9:30pm yesterday, per family member, pt was speaking to her on the phone yesterday speaking in full sentences when he suddenly began to speak one word at a time. This morning, a different family member was speaking to him, states he was saying one word at a time and then suddenly stopped speaking entirely. NIHSS 10 upon arrival for severe aphasia/dysarthria as pt nonverbal, intermittently able to follow commands, baseline R facial droop, LOC questions. NI alert activated 14:02. CTH negative, CTA head and neck with focal occlusion of L MCA M2/M3, CTP with 41 mL of delayed perfusion in the left frontal lobe correlating with the CTA finding. No tnk as pt out of window. NI actual activated per [facility] attending Dr. Rubio at 14:09. Risk vs benefits discussion of MT occurred with emergency contact Shanon over the phone, who consented to procedure. Pt signed out to [facility] with plans to admit for further monitoring post procedure. IV-tenecetplase(Y/N):    NO                      Reason IV-tenecetplase not given: out of window     *****NI alert time: 14:02  *****NI actual/decision time: 14:09     75 y/o M with PMHx of parkinsons, HTN, DM, gout, seizures on keppra, L bhatt radiate stroke in January 2025 on ASA 81mg and residual expressive aphasia/dysarthria/R facial droop, presenting to the ED with worsening word finding difficulty, now unable to speak at all. LKW 9:30pm yesterday, per family member, pt was speaking to her on the phone yesterday speaking in full sentences when he suddenly began to speak one word at a time. This morning, a different family member was speaking to him, states he was saying one word at a time and then suddenly stopped speaking entirely. NIHSS 10 upon arrival for severe aphasia/dysarthria as pt nonverbal, intermittently able to follow commands, baseline R facial droop, LOC questions. NI alert activated 14:02. CTH negative, CTA head and neck with focal occlusion of L MCA M2/M3, CTP with 41 mL of delayed perfusion in the left frontal lobe correlating with the CTA finding. No tnk as pt out of window. NI actual activated per [facility] attending Dr. Rubio at 14:09. Risk vs benefits discussion of MT occurred with emergency contact Shanon over the phone, who consented to procedure. Pt signed out to NCC with plans to admit for further monitoring post procedure.

## 2025-04-23 NOTE — PATIENT PROFILE ADULT - FALL HARM RISK - HARM RISK INTERVENTIONS
Assistance with ambulation/Assistance OOB with selected safe patient handling equipment/Communicate Risk of Fall with Harm to all staff/Monitor for mental status changes/Move patient closer to nurses' station/Reinforce activity limits and safety measures with patient and family/Reorient to person, place and time as needed/Tailored Fall Risk Interventions/Toileting schedule using arm’s reach rule for commode and bathroom/Use of alarms - bed, chair and/or voice tab/Visual Cue: Yellow wristband and red socks/Bed in lowest position, wheels locked, appropriate side rails in place/Call bell, personal items and telephone in reach/Instruct patient to call for assistance before getting out of bed or chair/Non-slip footwear when patient is out of bed/Donner to call system/Physically safe environment - no spills, clutter or unnecessary equipment/Purposeful Proactive Rounding/Room/bathroom lighting operational, light cord in reach

## 2025-04-23 NOTE — ED PROVIDER NOTE - OBJECTIVE STATEMENT
Patient is a 74-year-old male with PMH stroke, Parkinson's, diabetes, dementia, lower back pain, seizure on Keppra, anxiety, depression, insomnia, hypertension, anemia coming from [facility] for stroke.  Per EMS-staff at [facility] reported sudden onset aphasia at 1230, patient typically ANO 3 and speaks at baseline.  Patient unable to verbalize words but is awake.

## 2025-04-23 NOTE — ED PROVIDER NOTE - PHYSICAL EXAMINATION
CONST: in NAD  EYES: EOMI, Sclera and conjunctiva clear.   ENT: No nasal discharge. Oropharynx normal appearing, no erythema or exudates. Uvula midline.  NECK: Non-tender  CARD: Normal S1 S2; Normal rate and rhythm  RESP: Equal BS B/L, No wheezes, rhonchi or rales. No distress  GI: Soft, non-tender, non-distended.  MS: Normal ROM in all extremities. No midline spinal tenderness.  SKIN: Warm, dry, Good turgor  Neuro Exam:  Language: unable to naming common objects, difficulty repeating a phrase; global aphasia  Cranial Nerves: visual acuity intact bilaterally, pupils equal round and reactive to light, extraocular motion intact, facial sensation intact symmetrically, partial paralysis of lower face, hearing was intact bilaterally, tongue and palate midline, head turning and shoulder shrug symmetric and there was no tongue deviation with protrusion.   Motor: muscle strength was normal in all four extremities. normal gait  Sensory exam: light touch was intact.   Coordination: there was no past-pointing. no tremor present.

## 2025-04-23 NOTE — ED PROVIDER NOTE - PROGRESS NOTE DETAILS
TC: Stroke team says not a TNK candidate given last known normal was last night but they will take pt to IR for thrombectomy.

## 2025-04-23 NOTE — ED ADULT NURSE NOTE - OBJECTIVE STATEMENT
Patient is a 74 year old male BIBEMS from [facility] living, sudden onset of aphasia. Unable to get words out. LKW 30min ago. FS 82

## 2025-04-23 NOTE — H&P ADULT - NSHPLABSRESULTS_GEN_ALL_CORE
10.9   6.73  )-----------( 107      ( 23 Apr 2025 14:12 )             31.6   04-23    141  |  105  |  18  ----------------------------<  79  4.3   |  25  |  1.0    Ca    9.4      23 Apr 2025 14:12    TPro  7.0  /  Alb  4.3  /  TBili  1.1  /  DBili  x   /  AST  71[H]  /  ALT  18  /  AlkPhos  98  04-23  PT/INR - ( 23 Apr 2025 14:12 )   PT: 11.50 sec;   INR: 0.98 ratio         PTT - ( 23 Apr 2025 14:12 )  PTT:31.1 sec    < from: CT Brain Stroke Protocol (04.23.25 @ 13:41) >    IMPRESSION:    No CT evidence of acute intracranial pathology.    Stable mild-moderate chronic microvascular ischemic changes. Multiple   scattered chronic infarcts.        < from: CT Angio Brain Stroke Protocol  w/ IV Cont (04.23.25 @ 13:59) >      IMPRESSION:    CT PERFUSION:  41 mL of delayed perfusion in the left frontal lobe correlating with the   CTA finding. No perfusion evidence of completed infarction. Further   evaluation with MRI can be considered as clinically indicated.    CTA HEAD/NECK:  Focal occlusion in the M2/M3 junction of the superior division of the   left MCA, new since the prior CTA from 1/20/2023.    New multifocal severe stenosis in the left A3 FITO.    Stable moderate stenosis in the proximal right M1 MCA.    New diffuse moderate to severe irregular stenosis in the bilateral PCAs,   left worse than right.    Stable severe left/moderate right atherosclerotic stenosis in the V4   vertebral arteries.

## 2025-04-23 NOTE — CHART NOTE - NSCHARTNOTEFT_GEN_A_CORE
74 year old male, history of parkinsons HTN DM seizures on keppra, left corona radiata stroke January 2025, on aspirin 81 mg QD, residual expressive aphasia, dysarthria, right facial droop.  pm. NIHSS 9.   Reported left MCA M2/3 occlusion on CTA, with + left MCA territory perfusion deficit. NI actual activated 14:09, consent obtained from patient's daughter over the phone.   NIHSS 9 prior to procedure.    c< from: CT Angio Neck Stroke Protocol w/ IV Cont (04.23.25 @ 13:59) >    IMPRESSION:    CT PERFUSION:  41 mL of delayed perfusion in the left frontal lobe correlating with the   CTA finding. No perfusion evidence of completed infarction. Further   evaluation with MRI can be considered as clinically indicated.    CTA HEAD/NECK:  Focal occlusion in the M2/M3 junction of the superior division of the   left MCA, new since the prior CTA from 1/20/2023.    New multifocal severe stenosis in the left A3 FITO.    Stable moderate stenosis in the proximal right M1 MCA.    New diffuse moderate to severe irregular stenosis in the bilateral PCAs,   left worse than right.    Stable severe left/moderate right atherosclerotic stenosis in the V4   vertebral arteries.      < end of copied text >    < from: CT Brain Stroke Protocol (04.23.25 @ 13:41) >      IMPRESSION:    No CT evidence of acute intracranial pathology.    Stable mild-moderate chronic microvascular ischemic changes. Multiple   scattered chronic infarcts.    Communication: The summary of above findings were discussed with readback   confirmation with MIRNA Beltre by radiologist Dr. Maza on 4/23/2025 at 1:52   PM.    --- End of Report ---    < end of copied text >

## 2025-04-23 NOTE — ED ADULT TRIAGE NOTE - CHIEF COMPLAINT QUOTE
BIBEMS from [facility], sudden onset of aphasia. Unable to get words out. LKW 30min ago. FS 82

## 2025-04-23 NOTE — ED PROVIDER NOTE - CLINICAL SUMMARY MEDICAL DECISION MAKING FREE TEXT BOX
Pt here with acute onset of phasia. Vitals reassuring in ED. Pt only able to say "yeah" once and otherwise follows commands. Stroke code activated in triage. Plan for labs, ekg, imaging r/o ICH, ischemic stroke, TIA, arrhythmia. CT head noncon negative. CTA head/neck perfusion shows L M2 occlusion per neurology team. Later collateral obtained from family reveals that LKN was last night. Stroke team says not a TNK candidate given last known normal was last night but they will take pt to IR for thrombectomy. Pt requires admission for thrombectomy of CVA given risk of progression to hemorrhagic conversion, permanent neurologic deficits, brain herniation, etc if not closely monitored and tx'ed. Pt here with acute onset of phasia. Vitals reassuring in ED. Pt only able to say "yeah" once and otherwise follows commands. Stroke code activated in triage. Plan for labs, ekg, imaging r/o ICH, ischemic stroke, TIA, arrhythmia. CT head noncon negative. CTA head/neck perfusion shows L M2 occlusion per neurology team. Later collateral obtained from family reveals that LKN was last night. Stroke team says not a TNK candidate given last known normal was last night but they will take pt to IR for thrombectomy. Inpatient team will f/u labs. Pt requires admission for thrombectomy of CVA given risk of progression to hemorrhagic conversion, permanent neurologic deficits, brain herniation, etc if not closely monitored and tx'ed.

## 2025-04-23 NOTE — H&P ADULT - HISTORY OF PRESENT ILLNESS
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 LKW 9:30pm yesterday, per family member, pt was speaking to her on the phone yesterday speaking in full sentences when he suddenly began to speak one word at a time. This morning, a different family member was speaking to him, states he was saying one word at a time and then suddenly stopped speaking entirely. NIHSS 10 upon arrival for severe aphasia/dysarthria as pt nonverbal, intermittently able to follow commands, baseline R facial droop, LOC questions. NI alert activated 14:02. CTH negative, CTA head and neck with focal occlusion of L MCA M2/M3, CTP with 41 mL of delayed perfusion in the left frontal lobe correlating with the CTA finding. No tnk as pt out of window. NI actual activated. During angiogram, no occlusion observed to perform thrombectomy on however diffuse ICAD vs. vasospasm was sen in the left ICA for which he received 10mg IA Verapamil with improvement in vessel narrowing  Post-op NIH 9 improving with BP augmentation Pt admitted to NSICU for BP augmentation following CVA.    Admission scores:  NIH 10  mRS 1    75 y/o M with PMHx of parkinsons, HTN, DM, gout, seizures on keppra, L bhatt radiate stroke in January 2025 on ASA 81mg and residual expressive aphasia/dysarthria/R facial droop, presenting to the ED with worsening word finding difficulty, now unable to speak at all. LKW 9:30pm yesterday, per family member, pt was speaking to her on the phone yesterday speaking in full sentences when he suddenly began to speak one word at a time. This morning, a different family member was speaking to him, states he was saying one word at a time and then suddenly stopped speaking entirely. NIHSS 10 upon arrival for severe aphasia/dysarthria as pt nonverbal, intermittently able to follow commands, baseline R facial droop, LOC questions. NI alert activated 14:02. CTH negative, CTA head and neck with focal occlusion of L MCA M2/M3, CTP with 41 mL of delayed perfusion in the left frontal lobe correlating with the CTA finding. No tnk as pt out of window. NI actual activated. During angiogram, no occlusion observed to perform thrombectomy on however diffuse ICAD vs. vasospasm was sen in the left ICA for which he received 10mg IA Verapamil with improvement in vessel narrowing  Post-op NIH 9. Exam improved to NIH 8 with BP augmentation at  mmHg. Pt admitted to NSICU for BP augmentation following CVA.    Admission scores:  NIH 10  mRS 1    73 y/o M with PMHx of parkinsons, HTN, DM, gout, seizures on keppra, L bhatt radiate stroke in January 2025 on ASA 81mg and residual expressive aphasia/dysarthria/R facial droop, presenting to the ED with worsening word finding difficulty, now unable to speak at all. LKW 9:30pm yesterday, per family member, pt was speaking to her on the phone yesterday speaking in full sentences when he suddenly began to speak one word at a time. This morning, a different family member was speaking to him, states he was saying one word at a time and then suddenly stopped speaking entirely. NIHSS 10 upon arrival for severe aphasia/dysarthria as pt nonverbal, intermittently able to follow commands, baseline R facial droop, LOC questions. NI alert activated 14:02. CTH negative, CTA head and neck with focal occlusion of L MCA M2/M3, CTP with 41 mL of delayed perfusion in the left frontal lobe correlating with the CTA finding. No tnk as pt out of window. NI actual activated. During angiogram, no occlusion observed to perform thrombectomy on however diffuse ICAD vs. vasospasm was sen in the left ICA for which he received 10mg IA Verapamil with improvement in vessel narrowing  Post-op NIH 9. Exam improved to NIH 5 w/ BP augmentation at SBP 178mm Hg.Pt admitted to NSICU for BP augmentation following CVA.    Admission scores:  NIH 10  mRS 1

## 2025-04-23 NOTE — ED PROVIDER NOTE - DIFFERENTIAL DIAGNOSIS
differential dx includes but is not limited to:  ICH, ischemic stroke, TIA, arrhythmia Differential Diagnosis

## 2025-04-23 NOTE — PRE-ANESTHESIA EVALUATION ADULT - NSANTHOSAYNRD_GEN_A_CORE
[facility]     5/11/2022  Pain follow up     FAMILY PHYSICIAN: Samanta Oh DO    CHIEF COMPLAINT:  Chief Complaint   Patient presents with   • Other     rm d   • Follow-up   • Office Visit       HISTORY OF PRESENT ILLNESS:    Previously:  Khuhsboo Chino is a 59 year old female who presents with pain that started around end of October. It was to the point she had to leave [place] early. She feels she has reached a \"breaking point\" and has to lay down for hours at a time.     She is unsure why this is occurring. She had COVID and a few falls (did not not fall on buttocks, did not lose consciousness that she noted) in 2020 but did not seem related. Lives with . No family history of this problem. She states it feels like sitting on a rock - localized to the lower buttocks and very specific, occasionally sharp. Does not radiate anywhere. Occasionally has right knee pain.  No motor or sensory issues, no pain in pudendal nerve distribution. DVT issues are in the opposite leg. Does not do PT.     Today: 5/11/2022     Patient returns to the interventional pain clinic for gluteal/groin pain.     New morbidities since last visit: working with Kyara in PT and pain has decreased already from a 9 to a 6, which pleases her. She is doing home exercises and doing some stationary bike. She was able to walk a whole car show recently (some more pain towards the end of the day).     New complaints since last visit: no     Patient reports the following outcomes from the recommendations of the last visit on 5-  1. Medications: no change  2. Interventions: no change  3: Physical therapy: doing lots of stretches and strength exercises   4. Psychology: no change  5. Other referral: PT     Patient's functioning is: improved     RELEVANT MEDICAL CONCERNS  · Blood thinners other than aspirin (i.e. Coumadin or Plavix) - Yes Warfarin (Coumadin)  · Aware of any relevant allergy -  None  · Other doctors treating spine symptoms - no   · Pregnant - no   Diabetes:  Type II Diabetes   Recent Labs   Lab 11/24/21  0838   Hemoglobin A1C 6.6*   ·  No results for input(s): [id number] in the last 8765 hours.  · Involved in a legal process regarding current symptoms - Not involved in legal process regarding symptoms    GOALS IN SEEKING TREATMENT   Relief from pain symptoms, Return to leisure activities and Improved sleep    PAST MEDICAL HISTORY:  Past Medical History:   Diagnosis Date   • Anxiety disorder due to general medical condition with panic attack 01/20/2021    Referred by [place] [facility] ([facility]) for psychological eval, part of Disability Determination Evaluation, seen by Trish Frederick PsyD.    • Blood clot associated with vein wall inflammation    • Cirrhosis of liver (CMS/[facility])    • DVT (deep venous thrombosis) (CMS/[facility]) 10/02/2012    Occlusive thrombus from common femoral vein inferiorly to the popliteal vein per US venous RLE 10/2/20   • Essential (primary) hypertension    • Gastroesophageal reflux disease    • Major depressive disorder, single episode, moderate (CMS/[facility]) 01/20/2021    Referred by [place] [facility] ([facility]) for psychological eval, part of Disability Determination Evaluation, seen by Trish Frederick PsyD.    • Obesity (BMI 35.0-39.9 without comorbidity)    • DENISE on CPAP     CPAP at 12 cmH2O ([facility]). Followed by Dr. Filippo Romero [phone]   • Pulmonary embolism (CMS/[facility])    • SOB (shortness of breath)     Followed by Dr [facility]ilippo [facility] [phone]   • T2DM (type 2 diabetes mellitus) (CMS/[facility])    • Urinary incontinence    • Urinary tract infection        PAST SURGICAL HISTORY:     Past Surgical History:   Procedure Laterality Date   • Appendectomy     • Cholecystectomy     • Removal gallbladder     • Tonsillectomy          FAMILY HISTORY:  Family History   Problem Relation Age of  Onset   • Cancer Mother    • Diabetes Father      Family history of chemical dependency:  No  Family history of alcoholism:  No    SOCIAL HISTORY:  Living situation: The patient lives with spouse  Occupation:  Currently not working, since COVID 2020. Worked at [employer]    Social [employer]     Socioeconomic History   • Marital status: /Civil Union     Spouse name: Not on file   • Number of children: Not on file   • Years of education: Not on file   • Highest education level: Not on file   Occupational History   • Not on file   Tobacco Use   • Smoking status: Never Smoker   • Smokeless tobacco: Never Used   Vaping Use   • Vaping Use: never used   Substance and Sexual Activity   • Alcohol use: Not Currently     Alcohol/week: 0.0 standard drinks     Comment: social   • Drug use: Never   • Sexual activity: Not on file   Other Topics Concern   • Not on file   Social History Narrative   • Not on file     Social Determinants of Health     Financial Resource Strain: Not on file   Food Insecurity: Not on file   Transportation Needs: Not on file   Physical Activity: Not on file   Stress: Not on file   Social Connections: Not on file   Intimate Partner Violence: Not At Risk   • Social Determinants: Intimate Partner Violence Past Fear: No   • Social Determinants: Intimate Partner Violence Current Fear: No       ALLERGIES:   Allergen Reactions   • Hydrochlorothiazide W/Triamterene DIZZINESS     headache, dizzy, constipation   • Metoclopramide WEAKNESS     Tired    • Penicillins RASH     Tolerated rocephin in April 2021   • Pioglitazone Other (See Comments)     Hypoglycemia (worked too well)       MEDICATIONS:  Current Outpatient Medications   Medication Sig Dispense Refill   • metoPROLOL succinate (TOPROL-XL) 50 MG 24 hr tablet TAKE 1 TABLET BY MOUTH DAILY 90 tablet 1   • Insulin Pen Needle (BD ULTRA-FINE PEN NEEDLES) 29G X 12.7MM Misc USE TO INJECT INSULIN TWICE DAILY. REMOVE NEEDLE COVERS TO EXPOSE NEEDLE BEFORE INJECTING  100 each 11   • dulaglutide (Trulicity) 1.5 MG/0.5ML pen-injector ADMINISTER 1.5 MG UNDER THE SKIN 1 TIME A WEEK 2 mL 5   • urea (CARMOL 40) 40 % cream Apply to affected area twice a day to feet 227 g 3   • glipiZIDE (GLUCOTROL) 10 MG tablet TAKE 1 TABLET BY MOUTH TWICE DAILY BEFORE MEALS 180 tablet 1   • warfarin (COUMADIN) 5 MG tablet TAKE ONE AND ONE-HALF TABLET BY MOUTH EVERY EVENING EXCEPT ON  MONDAY THEN TAKE 1 TABLET 180 tablet 1   • escitalopram (LEXAPRO) 10 MG tablet Take 1 tablet by mouth daily. Indications: Generalized Anxiety Disorder, Major Depressive Disorder 30 tablet 5   • metFORMIN (GLUCOPHAGE-XR) 500 MG 24 hr tablet TAKE 4 TABLETS BY MOUTH DAILY WITH BREAKFAST 120 tablet 5   • rosuvastatin (CRESTOR) 5 MG tablet TAKE 1 TABLET BY MOUTH DAILY 90 tablet 1   • Lantus SoloStar 100 UNIT/ML pen-injector INJECT 70 UNITS INTO THE SKIN DAILY. PRIME 2 UNITS BEFORE EACH DOSE 15 mL 5   • spironolactone (ALDACTONE) 50 MG tablet Take 1 tablet by mouth daily. 30 tablet 5   • furosemide (LASIX) 20 MG tablet Take 1 tablet by mouth 2 times daily. Indications: Edema For flare ups. 30 tablet 2   • gabapentin (NEURONTIN) 300 MG capsule Take 1 capsule by mouth at bedtime. 30 capsule 5   • lisinopril (ZESTRIL) 10 MG tablet TAKE 1 TABLET BY MOUTH DAILY 90 tablet 1   • fluticasone-vilanterol (Breo Ellipta) 200-25 MCG/INH inhaler Inhale 1 puff into the lungs daily. 60 each 11   • tiZANidine (ZANAFLEX) 2 MG tablet Take 1 tablet by mouth every 8 hours as needed (Back Pain). 20 tablet 0   • lidocaine (LIDOCARE) 4 % patch Place 1 patch onto the skin every 24 hours as needed for Pain. 30 patch 0   • Microlet Lancets Misc USE DAILY 100 each 3   • ketorolac (TORADOL) 10 MG tablet Take 1 tablet by mouth every 6 hours as needed for Pain. 15 tablet 0   • acetaminophen (TYLENOL) 500 MG tablet Take 500 mg by mouth every 6 hours as needed for Pain.     • [email or web address] CONTOUR NEXT TEST test strip USE AS DIRECTED TO TEST BLOOD SUGAR LEVELS TWICE  No. GISELE screening performed.  STOP BANG Legend: 0-2 = LOW Risk; 3-4 = INTERMEDIATE Risk; 5-8 = HIGH Risk DAILY 200 strip 3   • clindamycin (CLEOCIN T) 1 % topical solution Apply topically 2 times daily. Indications: furuncle For 7-14 days. 60 mL 1   • triamcinolone (ARISTOCORT) 0.1 % cream Apply topically to affected area(s) twice daily for 2 weeks. Patient may combine with nystatin cream 1:1. 30 g 2   • nystatin (MYCOSTATIN) 571533 UNIT/GM cream Indications: Skin Infection due to Candida Yeast Apply topically to affected area(s) 2-3 times daily until resolution. 60 g 1   • albuterol (PROVENTIL HFA) 108 (90 Base) MCG/ACT inhaler Inhale 2 puffs into the lungs every 4 hours as needed for Shortness of Breath or Wheezing. 1 Inhaler 12   • sumatriptan (IMITREX) 50 MG tablet Take 1 tablet by mouth as needed for Migraine. Take 50 mg orally every 2 hours as needed for migraine. Max 200 mg/24 hours. 9 tablet 3   • B Complex-C-Folic Acid (SM B SUPER VITAMIN COMPLEX PO) Take 1 each by mouth daily.     • aspirin 81 MG tablet Take 81 mg by mouth nightly.      • Vitamin D, Cholecalciferol, 1000 UNITS Tab Take 1,000 Units by mouth daily.      • Omega 3 1000 MG capsule Take 1 capsule by mouth daily.        No current facility-administered medications for this visit.        REVIEW OF SYSTEMS:  (see pertinent ROS above)     PHYSICAL EXAMINATION:  Blood pressure 128/58, pulse 75, height 5' 7\" (1.702 m), weight (!) 176.4 kg (389 lb), SpO2 93 %.  Constitutional: Normal appearance, grooming and nutrition. Obese.  Head/Face: Normocephalic. Atraumatic  Eyes: Normal eyes on inspection  Ears: Normal ears on inspection.  Respiration: Normal respiratory effort, No labored breathing  Cardiovascular: Extremities well-perfused  Gait -  less antalgic gait. Ambulates without  assist device, not using furniture  Musculoskeletal: No gross deformities.Pinpoint area of tenderness between sacrum and ischial tuberosity on the LEFT, right side nontender  Thorax: No gross scoliosis or kyphosis.  Lumbar Spine: deferred  Skin: Normal, no rashes.  Neurological:  Cranial nerves II-XII grossly intact.  Motor: 5/5 muscle strength throughout.   Sensory: Intact to gross touch in all extremities     DIAGNOSTICS/IMAGING:    Reviewed previously ordered  MRI and X-rays. My independent review is as follows: L1 Schmorl's node with outpouching of the dura into the defect. + retrolisthesis and spinal stenosis at this level. Lower segments patent..     I reviewed MRI images with the patient in the clinic.    MRI LUMBAR SPINE W WO  CONTRAST     HISTORY: Low back pain, > 6 wks, Low back pain, progressive neurologic  deficit.     COMPARISON: Lumbar spine x-rays from 2/8/2021.     TECHNIQUE:  Multiplanar, multisequence MR imaging was performed of the lumbar spine. 15 mL of intravenous Gadavist was administered.     FINDINGS:      5 nonrib-bearing lumbar type vertebrae are identified and designated L1-L5.     Redemonstrated 1 retrolisthesis is noted at L1-L2. The vertebral body heights are preserved. No pathologic marrow lesions are identified.      The conus medullaris terminates at L1-L2 and is unremarkable.     There is no abnormal enhancement.        L1-L2:   Disc: Moderate disc bulge. Incidental note is made of a cystic focus along the posterior margin of the L1 inferior endplate and compresses the Schmorl's node, measuring 1 cm. Ligamentum flavum: Mildly thickened. Facets: Moderate facet arthropathy. Spinal canal/thecal sac: Severely narrowed. Neural foramina: Moderately narrowed.     L2-L3:  Disc: Moderate disc bulge. Ligamentum flavum: Mildly thickened. Facets: Moderate to severe facet arthropathy. Spinal canal/thecal sac: Moderate to severely narrowed. Neural foramina: Mildly narrowed.     L3-L4:  Disc: Mild disc bulge right foraminal protrusion with underlying bulge. Ligamentum flavum: Moderately thickened. Facets: Moderate to severe facet arthropathy. Spinal canal/thecal sac: Moderately narrowed. The right lateral recess is severely narrowed. Neural foramina: Moderate right  foraminal narrowing.     L4-L5:  Disc: No disc bulge or protrusion is appreciated.  Ligamentum flavum: Not significantly thickened.  Facets: Severe right and moderate left facet arthropathy. Spinal canal: Patent. Neural foramina: Mild right foraminal narrowing.     L5-S1:  Disc: Mild annular fissure.  Ligamentum flavum: Not significantly thickened.  Facets: Mild to moderate right greater than left facet arthropathy.  Spinal canal/thecal sac: Patent.  Neural foramina: Patent.      IMPRESSION:      Degenerative changes in the lumbar spine are most prominent at L1-L2, where there is severe narrowing of the spinal canal and moderate narrowing of the neural foramina due to disc bulge, ligamentous thickening, and facet arthropathy.     XR SACRUM AND COCCYX 2 + VW     DATE: 4/6/2022     HISTORY: Left buttocks pain, sacral pain     COMPARISON: CT of the abdomen and pelvis from 10/18/2021     TECHNIQUE: 3 standard views were obtained      FINDINGS:  The sacrum and coccyx demonstrate no evidence of fracture or dislocation or suspicious osteolytic or osteoblastic lesions.     There is degenerative change involving the bilateral sacroiliac joints with  moderate subchondral sclerosis present bilaterally and moderately bulky  anterior spur formation present on the left. There is posterior element  hypertrophy in the visualized low lumbar spine.      IMPRESSION:  1. Unremarkable sacrum and coccyx.     2. Degenerative change, as above.      EMG report received, results indicate:  Mj Conley MD     4/16/2021  2:32 PM     Electromyography & Nerve Conduction Report:     History:   Patient has symptoms of right lower limb weakness, pain and foot paresthesias.  Khushboo BOND Person has a known, significant history of DM-2, with lower limb edema, and Tinel's tap sign positive at right ankle.     Electrophysiological conclusions:   1. There is electrophysiological evidence of right L5 >> S1 chronic radiculopathy.   2. There is additional  electrophysiological evidence of right tibial entrapment neuropathy at medial ankle, clinically consistent with right Tarsal tunnel syndrome.   3. There is no electrophysiological evidence of L2-S1 acute/active radiculopathy, lumbo-sacral plexopathy, myopathy, any other entrapment neuropathy, or peripheral polyneuropathy at bilateral lower limbs.        Recent Labs   Lab 11/24/21  0838   Hemoglobin A1C 6.6*     No results for input(s): [id number] in the last 8765 hours.      IMPRESSION:  1. Left buttock pain    2. Spinal stenosis of lumbar region with neurogenic claudication    3. Lumbar disc disease    4. Obesity, unspecified classification, unspecified obesity type, unspecified whether serious comorbidity present    5. Anticoagulated         Based on history, exam, and diagnostic findings, it appears that Khushboo BOND Person most likely has a diagnosis of chronic buttock pain in the setting of L1-2 spinal stenosis.  She does not have radicular features in the L1 dermatome or cauda equina type symptoms.  She has a pinpoint area of tenderness just below the ischial tuberosity on the left-considering that she has adductor muscle edema on the *right* it is likely that at some point she had the same pathology on the left.  Fortunately there is no concerning mass or lesion on pelvic MRI per radiology.     RECOMMENDATIONS:  No orders of the defined types were placed in this encounter.    New Prescriptions    No medications on file     Return if symptoms worsen or fail to improve.  · The patient is encouraged to continue home stretching and exercise regimen as previously outlined in therapy and clinic.     The patient was advised to see a physical therapist for gentle stretching exercises and conditioning exercises for management of pain. She has hamstring and adductor insertion pathology. The focus of the physical therapy should be on improving muscle strength and range of motion. The physical therapist would evaluate the  patient and determine the best forms of therapy to accomplish these goals. The patient was also advised to continue physical therapy and stretching exercises at home.      We could consider a ischial bursa injection (versus secondarily a left L1 transforaminal).  Both of these require holding of anticoagulation will be to proceed.  Considering risks, would hold off on unless pain is significant despite PT.     My clinical findings, medical decision-making, and treatment plan were discussed in depth with the patient, including a multimodal approach.     Thank you for allowing me to participate in the care of this patient. Please contact me with any additional questions.     Charmaine Moe MD  Interventional Pain  [facility]

## 2025-04-23 NOTE — BRIEF OPERATIVE NOTE - COMMENTS
Please follow post NI orders for neuro checks, distal pulses, vitals, and arteriotomy site checks placed for total of 2 hour recovery period following procedure.   Keep HOB elevated, right wrist straight and immobile for x2 hr    Keep IVF as ordered. NS @ 75CC/HR.    -180     Patient tolerated procedure well, hemodynamically stable, no change in neurological status compared to baseline.   NIHSS pre procedure _9_ post procedure _9_  Right wrist site CDI without evidence of bleeding hematoma oozing ecchymosis swelling at the time of closure. Site nontender to palpation. Temperature and color consistent bilaterally to palpation with intact distal pulses.     Please notify provider with any signs of bleeding or hematoma at arteriotomy site, change in mental status, vitals outside parameters, or absent distal pulses.     Dispo ICU vs 3E stroke pending attending discussion

## 2025-04-23 NOTE — BRIEF OPERATIVE NOTE - OPERATION/FINDINGS
Procedure : Diagnostic cerebral angiogram + verapamil injection into left ICA     Contrast: Visipaque 320   IA medications: Heparin 3000 IU, Verapamil 2.5mg, Nitroglycerin 200 mcg   Implants placed: n/a  Complications : n/a    Preliminary Report:  Selective diagnostic angiogram performed from Right radial arteriotomy site with preliminary findings of diffuse ICAD/possible vasospasm. No MT was attempted 2/2 no visualise LVO.  10mg IA Verapamil injected in to Left ICA with some improvement of narrowing.     Official IR neuro procedure note to follow.

## 2025-04-24 ENCOUNTER — RESULT REVIEW (OUTPATIENT)
Age: 75
End: 2025-04-24

## 2025-04-24 LAB
A1C WITH ESTIMATED AVERAGE GLUCOSE RESULT: 5.7 % — HIGH (ref 4–5.6)
ALBUMIN SERPL ELPH-MCNC: 4 G/DL — SIGNIFICANT CHANGE UP (ref 3.5–5.2)
ALBUMIN SERPL ELPH-MCNC: 4 G/DL — SIGNIFICANT CHANGE UP (ref 3.5–5.2)
ALP SERPL-CCNC: 101 U/L — SIGNIFICANT CHANGE UP (ref 30–115)
ALP SERPL-CCNC: 104 U/L — SIGNIFICANT CHANGE UP (ref 30–115)
ALT FLD-CCNC: 23 U/L — SIGNIFICANT CHANGE UP (ref 0–41)
ALT FLD-CCNC: 27 U/L — SIGNIFICANT CHANGE UP (ref 0–41)
ANION GAP SERPL CALC-SCNC: 18 MMOL/L — HIGH (ref 7–14)
ANION GAP SERPL CALC-SCNC: 18 MMOL/L — HIGH (ref 7–14)
APPEARANCE UR: CLEAR — SIGNIFICANT CHANGE UP
AST SERPL-CCNC: 41 U/L — SIGNIFICANT CHANGE UP (ref 0–41)
AST SERPL-CCNC: 44 U/L — HIGH (ref 0–41)
BASOPHILS # BLD AUTO: 0.02 K/UL — SIGNIFICANT CHANGE UP (ref 0–0.2)
BASOPHILS NFR BLD AUTO: 0.1 % — SIGNIFICANT CHANGE UP (ref 0–1)
BILIRUB SERPL-MCNC: 1.2 MG/DL — SIGNIFICANT CHANGE UP (ref 0.2–1.2)
BILIRUB SERPL-MCNC: 1.3 MG/DL — HIGH (ref 0.2–1.2)
BILIRUB UR-MCNC: NEGATIVE — SIGNIFICANT CHANGE UP
BUN SERPL-MCNC: 13 MG/DL — SIGNIFICANT CHANGE UP (ref 10–20)
BUN SERPL-MCNC: 14 MG/DL — SIGNIFICANT CHANGE UP (ref 10–20)
CALCIUM SERPL-MCNC: 8.4 MG/DL — SIGNIFICANT CHANGE UP (ref 8.4–10.5)
CALCIUM SERPL-MCNC: 8.5 MG/DL — SIGNIFICANT CHANGE UP (ref 8.4–10.5)
CHLORIDE SERPL-SCNC: 105 MMOL/L — SIGNIFICANT CHANGE UP (ref 98–110)
CHLORIDE SERPL-SCNC: 106 MMOL/L — SIGNIFICANT CHANGE UP (ref 98–110)
CHOLEST SERPL-MCNC: 108 MG/DL — SIGNIFICANT CHANGE UP
CO2 SERPL-SCNC: 15 MMOL/L — LOW (ref 17–32)
CO2 SERPL-SCNC: 18 MMOL/L — SIGNIFICANT CHANGE UP (ref 17–32)
COLOR SPEC: YELLOW — SIGNIFICANT CHANGE UP
CREAT SERPL-MCNC: 0.8 MG/DL — SIGNIFICANT CHANGE UP (ref 0.7–1.5)
CREAT SERPL-MCNC: 0.8 MG/DL — SIGNIFICANT CHANGE UP (ref 0.7–1.5)
DIFF PNL FLD: ABNORMAL
EGFR: 94 ML/MIN/1.73M2 — SIGNIFICANT CHANGE UP
EOSINOPHIL # BLD AUTO: 0 K/UL — SIGNIFICANT CHANGE UP (ref 0–0.7)
EOSINOPHIL NFR BLD AUTO: 0 % — SIGNIFICANT CHANGE UP (ref 0–8)
ESTIMATED AVERAGE GLUCOSE: 117 MG/DL — HIGH (ref 68–114)
GLUCOSE BLDC GLUCOMTR-MCNC: 146 MG/DL — HIGH (ref 70–99)
GLUCOSE BLDC GLUCOMTR-MCNC: 150 MG/DL — HIGH (ref 70–99)
GLUCOSE BLDC GLUCOMTR-MCNC: 186 MG/DL — HIGH (ref 70–99)
GLUCOSE BLDC GLUCOMTR-MCNC: 193 MG/DL — HIGH (ref 70–99)
GLUCOSE SERPL-MCNC: 135 MG/DL — HIGH (ref 70–99)
GLUCOSE SERPL-MCNC: 146 MG/DL — HIGH (ref 70–99)
GLUCOSE UR QL: NEGATIVE MG/DL — SIGNIFICANT CHANGE UP
HCT VFR BLD CALC: 45.6 % — SIGNIFICANT CHANGE UP (ref 42–52)
HCT VFR BLD CALC: 49.4 % — SIGNIFICANT CHANGE UP (ref 42–52)
HDLC SERPL-MCNC: 48 MG/DL — SIGNIFICANT CHANGE UP
HGB BLD-MCNC: 16 G/DL — SIGNIFICANT CHANGE UP (ref 14–18)
HGB BLD-MCNC: 16.9 G/DL — SIGNIFICANT CHANGE UP (ref 14–18)
IMM GRANULOCYTES NFR BLD AUTO: 1 % — HIGH (ref 0.1–0.3)
KETONES UR-MCNC: 40 MG/DL
LDLC SERPL-MCNC: 44 MG/DL — SIGNIFICANT CHANGE UP
LEUKOCYTE ESTERASE UR-ACNC: NEGATIVE — SIGNIFICANT CHANGE UP
LIPID PNL WITH DIRECT LDL SERPL: 44 MG/DL — SIGNIFICANT CHANGE UP
LYMPHOCYTES # BLD AUTO: 1.58 K/UL — SIGNIFICANT CHANGE UP (ref 1.2–3.4)
LYMPHOCYTES # BLD AUTO: 11 % — LOW (ref 20.5–51.1)
MAGNESIUM SERPL-MCNC: 1.7 MG/DL — LOW (ref 1.8–2.4)
MCHC RBC-ENTMCNC: 33.5 PG — HIGH (ref 27–31)
MCHC RBC-ENTMCNC: 33.5 PG — HIGH (ref 27–31)
MCHC RBC-ENTMCNC: 34.2 G/DL — SIGNIFICANT CHANGE UP (ref 32–37)
MCHC RBC-ENTMCNC: 35.1 G/DL — SIGNIFICANT CHANGE UP (ref 32–37)
MCV RBC AUTO: 95.6 FL — HIGH (ref 80–94)
MCV RBC AUTO: 98 FL — HIGH (ref 80–94)
MONOCYTES # BLD AUTO: 0.68 K/UL — HIGH (ref 0.1–0.6)
MONOCYTES NFR BLD AUTO: 4.7 % — SIGNIFICANT CHANGE UP (ref 1.7–9.3)
MRSA PCR RESULT.: NEGATIVE — SIGNIFICANT CHANGE UP
NEUTROPHILS # BLD AUTO: 11.92 K/UL — HIGH (ref 1.4–6.5)
NEUTROPHILS NFR BLD AUTO: 83.2 % — HIGH (ref 42.2–75.2)
NITRITE UR-MCNC: NEGATIVE — SIGNIFICANT CHANGE UP
NONHDLC SERPL-MCNC: 60 MG/DL — SIGNIFICANT CHANGE UP
NRBC BLD AUTO-RTO: 0 /100 WBCS — SIGNIFICANT CHANGE UP (ref 0–0)
NRBC BLD AUTO-RTO: 0 /100 WBCS — SIGNIFICANT CHANGE UP (ref 0–0)
PA ADP PRP-ACNC: 148 PRU — LOW (ref 182–335)
PH UR: 5.5 — SIGNIFICANT CHANGE UP (ref 5–8)
PHOSPHATE SERPL-MCNC: 2.5 MG/DL — SIGNIFICANT CHANGE UP (ref 2.1–4.9)
PLATELET # BLD AUTO: 177 K/UL — SIGNIFICANT CHANGE UP (ref 130–400)
PLATELET # BLD AUTO: 188 K/UL — SIGNIFICANT CHANGE UP (ref 130–400)
PMV BLD: 9.6 FL — SIGNIFICANT CHANGE UP (ref 7.4–10.4)
PMV BLD: 9.6 FL — SIGNIFICANT CHANGE UP (ref 7.4–10.4)
POTASSIUM SERPL-MCNC: 4.4 MMOL/L — SIGNIFICANT CHANGE UP (ref 3.5–5)
POTASSIUM SERPL-MCNC: 4.6 MMOL/L — SIGNIFICANT CHANGE UP (ref 3.5–5)
POTASSIUM SERPL-SCNC: 4.4 MMOL/L — SIGNIFICANT CHANGE UP (ref 3.5–5)
POTASSIUM SERPL-SCNC: 4.6 MMOL/L — SIGNIFICANT CHANGE UP (ref 3.5–5)
PROT SERPL-MCNC: 6.7 G/DL — SIGNIFICANT CHANGE UP (ref 6–8)
PROT SERPL-MCNC: 6.8 G/DL — SIGNIFICANT CHANGE UP (ref 6–8)
PROT UR-MCNC: 100 MG/DL
RBC # BLD: 4.77 M/UL — SIGNIFICANT CHANGE UP (ref 4.7–6.1)
RBC # BLD: 5.04 M/UL — SIGNIFICANT CHANGE UP (ref 4.7–6.1)
RBC # FLD: 12.1 % — SIGNIFICANT CHANGE UP (ref 11.5–14.5)
RBC # FLD: 12.1 % — SIGNIFICANT CHANGE UP (ref 11.5–14.5)
SODIUM SERPL-SCNC: 139 MMOL/L — SIGNIFICANT CHANGE UP (ref 135–146)
SODIUM SERPL-SCNC: 141 MMOL/L — SIGNIFICANT CHANGE UP (ref 135–146)
SP GR SPEC: >1.03 — HIGH (ref 1–1.03)
TRIGL SERPL-MCNC: 79 MG/DL — SIGNIFICANT CHANGE UP
TSH SERPL-MCNC: 0.88 UIU/ML — SIGNIFICANT CHANGE UP (ref 0.27–4.2)
UROBILINOGEN FLD QL: 0.2 MG/DL — SIGNIFICANT CHANGE UP (ref 0.2–1)
WBC # BLD: 14.34 K/UL — HIGH (ref 4.8–10.8)
WBC # BLD: 19.07 K/UL — HIGH (ref 4.8–10.8)
WBC # FLD AUTO: 14.34 K/UL — HIGH (ref 4.8–10.8)
WBC # FLD AUTO: 19.07 K/UL — HIGH (ref 4.8–10.8)

## 2025-04-24 PROCEDURE — 70450 CT HEAD/BRAIN W/O DYE: CPT | Mod: 26

## 2025-04-24 PROCEDURE — 93970 EXTREMITY STUDY: CPT | Mod: 26

## 2025-04-24 PROCEDURE — 99232 SBSQ HOSP IP/OBS MODERATE 35: CPT

## 2025-04-24 PROCEDURE — 93306 TTE W/DOPPLER COMPLETE: CPT | Mod: 26

## 2025-04-24 RX ORDER — ENTACAPONE 200 MG/1
200 TABLET, FILM COATED ORAL THREE TIMES A DAY
Refills: 0 | Status: DISCONTINUED | OUTPATIENT
Start: 2025-04-24 | End: 2025-04-24

## 2025-04-24 RX ORDER — CLOPIDOGREL BISULFATE 75 MG/1
75 TABLET, FILM COATED ORAL DAILY
Refills: 0 | Status: DISCONTINUED | OUTPATIENT
Start: 2025-04-24 | End: 2025-05-01

## 2025-04-24 RX ORDER — LEVETIRACETAM 10 MG/ML
500 INJECTION, SOLUTION INTRAVENOUS
Refills: 0 | Status: DISCONTINUED | OUTPATIENT
Start: 2025-04-24 | End: 2025-05-01

## 2025-04-24 RX ORDER — ENTACAPONE 200 MG/1
200 TABLET, FILM COATED ORAL
Refills: 0 | Status: DISCONTINUED | OUTPATIENT
Start: 2025-04-24 | End: 2025-05-01

## 2025-04-24 RX ORDER — ENTACAPONE 200 MG/1
200 TABLET, FILM COATED ORAL
Refills: 0 | Status: DISCONTINUED | OUTPATIENT
Start: 2025-04-24 | End: 2025-04-24

## 2025-04-24 RX ORDER — MIRTAZAPINE 30 MG/1
7.5 TABLET, FILM COATED ORAL AT BEDTIME
Refills: 0 | Status: DISCONTINUED | OUTPATIENT
Start: 2025-04-24 | End: 2025-05-01

## 2025-04-24 RX ORDER — NOREPINEPHRINE BITARTRATE 8 MG
0.05 SOLUTION INTRAVENOUS
Qty: 8 | Refills: 0 | Status: DISCONTINUED | OUTPATIENT
Start: 2025-04-24 | End: 2025-04-24

## 2025-04-24 RX ORDER — CLOPIDOGREL BISULFATE 75 MG/1
75 TABLET, FILM COATED ORAL DAILY
Refills: 0 | Status: DISCONTINUED | OUTPATIENT
Start: 2025-04-24 | End: 2025-04-24

## 2025-04-24 RX ORDER — CARBIDOPA/LEVODOPA 25MG-100MG
2 TABLET ORAL
Refills: 0 | Status: DISCONTINUED | OUTPATIENT
Start: 2025-04-24 | End: 2025-04-24

## 2025-04-24 RX ORDER — CARBIDOPA/LEVODOPA 25MG-100MG
2 TABLET ORAL
Refills: 0 | Status: DISCONTINUED | OUTPATIENT
Start: 2025-04-24 | End: 2025-05-01

## 2025-04-24 RX ORDER — INSULIN LISPRO 100 U/ML
INJECTION, SOLUTION INTRAVENOUS; SUBCUTANEOUS
Refills: 0 | Status: DISCONTINUED | OUTPATIENT
Start: 2025-04-24 | End: 2025-04-28

## 2025-04-24 RX ORDER — RIVASTIGMINE 13.3 MG/24H
1 PATCH, EXTENDED RELEASE TRANSDERMAL EVERY 24 HOURS
Refills: 0 | Status: DISCONTINUED | OUTPATIENT
Start: 2025-04-24 | End: 2025-05-01

## 2025-04-24 RX ORDER — ENOXAPARIN SODIUM 100 MG/ML
40 INJECTION SUBCUTANEOUS EVERY 24 HOURS
Refills: 0 | Status: DISCONTINUED | OUTPATIENT
Start: 2025-04-24 | End: 2025-05-01

## 2025-04-24 RX ORDER — NOREPINEPHRINE BITARTRATE 8 MG
0.05 SOLUTION INTRAVENOUS
Qty: 8 | Refills: 0 | Status: DISCONTINUED | OUTPATIENT
Start: 2025-04-24 | End: 2025-04-25

## 2025-04-24 RX ORDER — RIVASTIGMINE 13.3 MG/24H
1 PATCH, EXTENDED RELEASE TRANSDERMAL EVERY 24 HOURS
Refills: 0 | Status: DISCONTINUED | OUTPATIENT
Start: 2025-04-24 | End: 2025-04-24

## 2025-04-24 RX ORDER — MAGNESIUM SULFATE 500 MG/ML
2 SYRINGE (ML) INJECTION ONCE
Refills: 0 | Status: COMPLETED | OUTPATIENT
Start: 2025-04-24 | End: 2025-04-24

## 2025-04-24 RX ADMIN — Medication 1 APPLICATION(S): at 06:39

## 2025-04-24 RX ADMIN — Medication 2 TABLET(S): at 18:07

## 2025-04-24 RX ADMIN — Medication 2 TABLET(S): at 21:42

## 2025-04-24 RX ADMIN — INSULIN LISPRO 2: 100 INJECTION, SOLUTION INTRAVENOUS; SUBCUTANEOUS at 23:16

## 2025-04-24 RX ADMIN — Medication 25 GRAM(S): at 06:39

## 2025-04-24 RX ADMIN — MIRTAZAPINE 7.5 MILLIGRAM(S): 30 TABLET, FILM COATED ORAL at 21:43

## 2025-04-24 RX ADMIN — POLYETHYLENE GLYCOL 3350 17 GRAM(S): 17 POWDER, FOR SOLUTION ORAL at 13:15

## 2025-04-24 RX ADMIN — INSULIN LISPRO 2: 100 INJECTION, SOLUTION INTRAVENOUS; SUBCUTANEOUS at 18:10

## 2025-04-24 RX ADMIN — NOREPINEPHRINE BITARTRATE 6.16 MICROGRAM(S)/KG/MIN: 8 SOLUTION at 09:45

## 2025-04-24 RX ADMIN — ENTACAPONE 200 MILLIGRAM(S): 200 TABLET, FILM COATED ORAL at 21:42

## 2025-04-24 RX ADMIN — ENTACAPONE 200 MILLIGRAM(S): 200 TABLET, FILM COATED ORAL at 18:07

## 2025-04-24 RX ADMIN — Medication 2 TABLET(S): at 13:16

## 2025-04-24 RX ADMIN — CLOPIDOGREL BISULFATE 75 MILLIGRAM(S): 75 TABLET, FILM COATED ORAL at 14:13

## 2025-04-24 RX ADMIN — LEVETIRACETAM 500 MILLIGRAM(S): 10 INJECTION, SOLUTION INTRAVENOUS at 18:07

## 2025-04-24 RX ADMIN — Medication 81 MILLIGRAM(S): at 13:17

## 2025-04-24 RX ADMIN — ATORVASTATIN CALCIUM 80 MILLIGRAM(S): 80 TABLET, FILM COATED ORAL at 21:42

## 2025-04-24 RX ADMIN — RIVASTIGMINE 1 PATCH: 13.3 PATCH, EXTENDED RELEASE TRANSDERMAL at 19:30

## 2025-04-24 RX ADMIN — Medication 1 TABLET(S): at 21:43

## 2025-04-24 RX ADMIN — LEVETIRACETAM 500 MILLIGRAM(S): 10 INJECTION, SOLUTION INTRAVENOUS at 09:44

## 2025-04-24 RX ADMIN — RIVASTIGMINE 1 PATCH: 13.3 PATCH, EXTENDED RELEASE TRANSDERMAL at 13:54

## 2025-04-24 RX ADMIN — ENOXAPARIN SODIUM 40 MILLIGRAM(S): 100 INJECTION SUBCUTANEOUS at 13:16

## 2025-04-24 RX ADMIN — Medication 500 MILLILITER(S): at 00:20

## 2025-04-24 RX ADMIN — Medication 75 MILLILITER(S): at 09:45

## 2025-04-24 RX ADMIN — ENTACAPONE 200 MILLIGRAM(S): 200 TABLET, FILM COATED ORAL at 13:17

## 2025-04-24 NOTE — OCCUPATIONAL THERAPY INITIAL EVALUATION ADULT - NSOTDISCHREC_GEN_A_CORE
pt requires assistance with functional transfers and ADLs, pt will benefit from/Acute Inpatient Rehab

## 2025-04-24 NOTE — OCCUPATIONAL THERAPY INITIAL EVALUATION ADULT - PERTINENT HX OF CURRENT PROBLEM, REHAB EVAL
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 LKW 9:30pm yesterday, per family member, pt was speaking to her on the phone yesterday speaking in full sentences when he suddenly began to speak one word at a time. This morning, a different family member was speaking to him, states he was saying one word at a time and then suddenly stopped speaking entirely. NIHSS 10 upon arrival for severe aphasia/dysarthria as pt nonverbal, intermittently able to follow commands, baseline R facial droop, LOC questions. NI alert activated 14:02. CTH negative, CTA head and neck with focal occlusion of L MCA M2/M3, CTP with 41 mL of delayed perfusion in the left frontal lobe correlating with the CTA finding. No tnk as pt out of window. NI actual activated. During angiogram, no occlusion observed to perform thrombectomy on however diffuse ICAD vs. vasospasm was sen in the left ICA for which he received 10mg IA Verapamil with improvement in vessel narrowing  Post-op NIH 9. Exam improved to NIH 5 w/ BP augmentation at SBP 178mm Hg.Pt admitted to NSICU for BP augmentation following CVA.

## 2025-04-24 NOTE — CONSULT NOTE ADULT - ASSESSMENT
IMPRESSION: Rehab of L stroke / aphasia, right facial droop,  gait ataxia /  Parkinson's disease, HTN, DM, gout, seizures on keppra, L bhatt radiate stroke in January 2025 on ASA 81mg and residual expressive aphasia/dysarthria/R facial droop    PRECAUTIONS: [   ] Cardiac  [   ] Respiratory  [   ] Seizures [   ] Contact Isolation  [   ] Droplet Isolation  [   ] Other    Weight Bearing Status:     RECOMMENDATION:    Out of Bed to Chair     DVT/Decubiti Prophylaxis    REHAB PLAN:     [   x ] Bedside P/T 3-5 times a week   [ x   ]   Bedside O/T  2-3 times a week             [  x  ] Speech Therapy               [    ]  No Rehab Therapy Indicated   Conditioning/ROM                                    ADL  Bed Mobility                                               Conditioning/ROM  Transfers                                                     Bed Mobility  Sitting /Standing Balance                         Transfers                                        Gait Training                                               Sitting/Standing Balance  Stair Training [   ]Applicable                    Home equipment Eval                                                                        Splinting  [   ] Only      GOALS:   ADL   [ x   ]   Independent                    Transfers  [    x] Independent                          Ambulation  [  x  ] Independent     [  x   ] With device                            [    ]  CG                                                         [    ]  CG                                                                  [    ] CG                            [    ] Min A                                                   [    ] Min A                                                              [    ] Min  A          DISCHARGE PLAN:   [    ]  Good candidate for Intensive Rehabilitation/Hospital based                                             Will tolerate 3hrs Intensive Rehab Daily                                       [     ]  Short Term Rehab in Skilled Nursing Facility                                       [     ]  Home with Outpatient or VN services                                         [  x   ]  Possible Candidate for Intensive Hospital based Rehab

## 2025-04-24 NOTE — SWALLOW BEDSIDE ASSESSMENT ADULT - COMMENTS
NIHSS 10 upon arrival for severe aphasia/dysarthria as pt nonverbal, intermittently able to follow commands, baseline R facial droop, LOC questions. NI alert activated 14:02. CTH negative, CTA head and neck with focal occlusion of L MCA M2/M3, CTP with 41 mL of delayed perfusion in the left frontal lobe correlating with the CTA finding. No tnk as pt out of window. NI actual activated. During angiogram, no occlusion observed to perform thrombectomy on however diffuse ICAD vs. vasospasm was sen in the left ICA for which he received 10mg IA Verapamil with improvement in vessel narrowing  Post-op NIH 9. Exam improved to NIH 5 w/ BP augmentation at SBP 178mm Hg.Pt admitted to NSICU for BP augmentation following CVA.    74M CVA with severe ICAD vs. vasospasm s/p IA verapamil

## 2025-04-24 NOTE — PROGRESS NOTE ADULT - ASSESSMENT
74M CVA with severe ICAD vs. vasospasm s/p IA verapamil    #CVA  ICAD    Plan:  #Neuro  -q1 NIH  -c/w ASA 81; verify whether pt on plavix and if so if compliant (obtain pru)-- if so would consider transitioning   -lipitor 80mg qd  -CTH AM- no acuity   -c/w  parkinson's   -c/w keppra 500mg BID  consider EEG if no improvement  -analgesia: tylenol prn  -stroke core measures  hemodynamic augmentation for exam optimization- 160-180  -PT/OT/Speech  bedrest given pressor requirement     #Pulm  -SpO2 >94%  -IS    #CV  --180  -levo gtt prn  -hold home anti-hypertensives  -telemetry  -TTE - P    #GI  passed dysphagia  dash/ccd modified   -bowel regimen: senna/miralax  -LBM - PTA    #/Renal  normonatremic 135-145  -K >4, Mg >2  -replete lytes as needed    #Endo  -euglycemia 140-180  -mod ISS  LDL 44  a1c tsh pending     #Heme  -SCDs  chemoppx with SQL    #ID  -afebrile, normothermic      dispo- NSICU

## 2025-04-24 NOTE — OCCUPATIONAL THERAPY INITIAL EVALUATION ADULT - PHYSICAL ASSIST/NONPHYSICAL ASSIST: EATING, OT EVAL
verbal cues/1 person assist impacted by ideational apraxia- attempts to utilize spoon as straw- once initiated able to continue appropriate use. able to use cup appropriately/verbal cues/1 person assist

## 2025-04-24 NOTE — PHYSICAL THERAPY INITIAL EVALUATION ADULT - MANUAL MUSCLE TESTING RESULTS, REHAB EVAL
I have reviewed and confirmed nurses' notes for patient's medications, allergies, medical history, and surgical history.
at least 3+/5 throughout BLE, BUE

## 2025-04-24 NOTE — SPEECH LANGUAGE PATHOLOGY EVALUATION - COMMENTS
slp to f/u with therapy severe expressive deficits known to SLP dept from past admissions (prev strokes and hx PD) severe motor speech deficits, known hx of dysarthria, now with suspected apraxia. mod to severe receptive deficits

## 2025-04-24 NOTE — OCCUPATIONAL THERAPY INITIAL EVALUATION ADULT - PLANNED THERAPY INTERVENTIONS, OT EVAL
ADL retraining/balance training/bed mobility training/cognitive, visual perceptual/strengthening/stretching/transfer training

## 2025-04-24 NOTE — PHYSICAL THERAPY INITIAL EVALUATION ADULT - GENERAL OBSERVATIONS, REHAB EVAL
[phone] Pt received semifowlers in bed, left sitting in bedside recliner, NAD, pt agreeable to PT session +tele +SpO2 +BP cuff +IVx2 +condom cath

## 2025-04-24 NOTE — CONSULT NOTE ADULT - SUBJECTIVE AND OBJECTIVE BOX
HPI:  75 y/o M with PMHx of parkinsons, HTN, DM, gout, seizures on keppra, L bhatt radiate stroke in January 2025 on ASA 81mg and residual expressive aphasia/dysarthria/R facial droop, presenting to the ED with worsening word finding difficulty, now unable to speak at all. LKW 9:30pm yesterday, per family member, pt was speaking to her on the phone yesterday speaking in full sentences when he suddenly began to speak one word at a time. This morning, a different family member was speaking to him, states he was saying one word at a time and then suddenly stopped speaking entirely. NIHSS 10 upon arrival for severe aphasia/dysarthria as pt nonverbal, intermittently able to follow commands, baseline R facial droop, LOC questions. NI alert activated 14:02. CTH negative, CTA head and neck with focal occlusion of L MCA M2/M3, CTP with 41 mL of delayed perfusion in the left frontal lobe correlating with the CTA finding. No tnk as pt out of window. NI actual activated. During angiogram, no occlusion observed to perform thrombectomy on however diffuse ICAD vs. vasospasm was sen in the left ICA for which he received 10mg IA Verapamil with improvement in vessel narrowing  Post-op NIH 9. Exam improved to NIH 5 w/ BP augmentation at SBP 178mm Hg.Pt admitted to NSICU for BP augmentation following CVA.    Admission scores:  NIH 10  mRS 1          PAST MEDICAL & SURGICAL HISTORY:  Parkinson disease      CVA (cerebral vascular accident)      HTN (hypertension)      Diabetes mellitus      Gout      History of appendectomy          Hospital Course:    TODAY'S SUBJECTIVE & REVIEW OF SYMPTOMS:     Constitutional WNL   Cardio WNL   Resp WNL   GI WNL  Heme WNL  Endo WNL  Skin WNL  MSK WNL  Neuro aphasia   Cognitive WNL  Psych WNL      MEDICATIONS  (STANDING):  aspirin  chewable 81 milliGRAM(s) Oral daily  atorvastatin 80 milliGRAM(s) Oral at bedtime  carbidopa/levodopa CR 50/200 1 Tablet(s) Oral <User Schedule>  chlorhexidine 2% Cloths 1 Application(s) Topical <User Schedule>  dextrose 5%. 1000 milliLiter(s) (50 mL/Hr) IV Continuous <Continuous>  dextrose 5%. 1000 milliLiter(s) (100 mL/Hr) IV Continuous <Continuous>  dextrose 50% Injectable 25 Gram(s) IV Push once  dextrose 50% Injectable 12.5 Gram(s) IV Push once  dextrose 50% Injectable 25 Gram(s) IV Push once  glucagon  Injectable 1 milliGRAM(s) IntraMuscular once  insulin lispro (ADMELOG) corrective regimen sliding scale   SubCutaneous every 6 hours  levETIRAcetam 500 milliGRAM(s) Oral two times a day  norepinephrine Infusion 0.049 MICROgram(s)/kG/Min (6.16 mL/Hr) IV Continuous <Continuous>  polyethylene glycol 3350 17 Gram(s) Oral daily  senna 2 Tablet(s) Oral at bedtime  sodium chloride 0.9%. 1000 milliLiter(s) (75 mL/Hr) IV Continuous <Continuous>    MEDICATIONS  (PRN):  acetaminophen     Tablet .. 650 milliGRAM(s) Oral every 6 hours PRN Temp greater or equal to 38C (100.4F), Mild Pain (1 - 3)  dextrose Oral Gel 15 Gram(s) Oral once PRN Blood Glucose LESS THAN 70 milliGRAM(s)/deciliter      FAMILY HISTORY:  FH: hypertension    FH: CAD (coronary artery disease)        Allergies    penicillin (Unknown)  cefaclor (Unknown)  sulfa drugs (Unknown)    Intolerances        SOCIAL HISTORY:    [  ] Etoh  [  ] Smoking  [  ] Substance abuse     Home Environment:  [   ] Home Alone  [   ] Lives with Family  [   ] Home Health Aid    Dwelling:  [   ] Apartment  [   ] Private House  [ x  ] Assisted living facility   [   ] Skilled Nursing Facility      [   ] Short Term  [   ] Long Term  [   ] Stairs       Elevator [   ]    FUNCTIONAL STATUS PTA: (Check all that apply)  Ambulation: [  x  ]Independent    [   ] Dependent     [   ] Non-Ambulatory  Assistive Device: [   ] SA Cane  [   ]  Q Cane  [   ] Walker  [   ]  Wheelchair  ADL : [  x ] Independent  [    ]  Dependent       Vital Signs Last 24 Hrs  T(C): 36.4 (24 Apr 2025 08:00), Max: 36.7 (23 Apr 2025 13:27)  T(F): 97.6 (24 Apr 2025 08:00), Max: 98.1 (23 Apr 2025 18:45)  HR: 80 (24 Apr 2025 09:45) (45 - 91)  BP: 161/82 (24 Apr 2025 09:45) (122/63 - 189/94)  BP(mean): 113 (24 Apr 2025 09:45) (107 - 133)  RR: 17 (24 Apr 2025 09:45) (7 - 21)  SpO2: 97% (24 Apr 2025 09:45) (96% - 100%)    Parameters below as of 24 Apr 2025 08:00  Patient On (Oxygen Delivery Method): room air          PHYSICAL EXAM: Awake & non verbal   GENERAL: NAD  HEAD:  Normocephalic  CHEST/LUNG: Clear   HEART: S1S2+  ABDOMEN: Soft, Nontender  EXTREMITIES:  no calf tenderness    NERVOUS SYSTEM:  Cranial Nerves 2-12 intact [   ] Abnormal  [ x  ] right facial droop   ROM: WFL all extremities [  x ]  Abnormal [   ]  Motor Strength: WFL all extremities  [ x  ]  Abnormal [   ]  Sensation: intact to light touch [ x  ] Abnormal [   ]    FUNCTIONAL STATUS:  Bed Mobility: Independent [   ]  Supervision [   ]  Needs Assistance [x   ]  N/A [   ]  Transfers: Independent [   ]  Supervision [   ]  Needs Assistance [   ]  N/A [   ]   Ambulation: Independent [   ]  Supervision [   ]  Needs Assistance [   ]  N/A [   ]  ADL: Independent [   ] Requires Assistance [   ] N/A [   ]      LABS:                        16.0   14.34 )-----------( 177      ( 24 Apr 2025 04:34 )             45.6     04-24    139  |  106  |  14  ----------------------------<  135[H]  4.4   |  15[L]  |  0.8    Ca    8.4      24 Apr 2025 04:34  Phos  2.5     04-24  Mg     1.7     04-24    TPro  6.7  /  Alb  4.0  /  TBili  1.2  /  DBili  x   /  AST  44[H]  /  ALT  27  /  AlkPhos  101  04-24    PT/INR - ( 23 Apr 2025 14:12 )   PT: 11.50 sec;   INR: 0.98 ratio         PTT - ( 23 Apr 2025 14:12 )  PTT:31.1 sec  Urinalysis Basic - ( 24 Apr 2025 09:30 )    Color: Yellow / Appearance: Clear / SG: >1.030 / pH: x  Gluc: x / Ketone: 40 mg/dL  / Bili: Negative / Urobili: 0.2 mg/dL   Blood: x / Protein: 100 mg/dL / Nitrite: Negative   Leuk Esterase: Negative / RBC: 7 /HPF / WBC 1 /HPF   Sq Epi: x / Non Sq Epi: 0 /HPF / Bacteria: Negative /HPF        RADIOLOGY & ADDITIONAL STUDIES:

## 2025-04-24 NOTE — OCCUPATIONAL THERAPY INITIAL EVALUATION ADULT - ADDITIONAL COMMENTS
unable to provide despite use of yes/no will further assess to clarify unable to provide despite use of yes/no will further assess to clarify 2* to aphasia

## 2025-04-24 NOTE — SPEECH LANGUAGE PATHOLOGY EVALUATION - SLP PERTINENT HISTORY OF CURRENT PROBLEM
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 LKW 9:30pm yesterday, per family member, pt was speaking to her on the phone yesterday speaking in full sentences when he suddenly began to speak one word at a time. This morning, a different family member was speaking to him, states he was saying one word at a time and then suddenly stopped speaking entirely.

## 2025-04-24 NOTE — OCCUPATIONAL THERAPY INITIAL EVALUATION ADULT - FINE MOTOR COORDINATION, LEFT HAND, MANIPULATION OF OBJECTS, OT EVAL
through observation of functional tasks appears intact - impacted only by ideational apraxia at times

## 2025-04-24 NOTE — OCCUPATIONAL THERAPY INITIAL EVALUATION ADULT - GENERAL OBSERVATIONS, REHAB EVAL
pt encountered semi parker in bed, pt agreed to OT session, +BP cuff, +pulse ox, +tele, +condom cath, BP [id number], pt encountered semi parker in bed, pt agreed to OT session, PT present throughout session, +BP cuff, +pulse ox, +tele, +condom cath, BP [id number],

## 2025-04-24 NOTE — PHYSICAL THERAPY INITIAL EVALUATION ADULT - ADDITIONAL COMMENTS
Pt admitted from [facility] living [facility]. Pt was on 4A rehab in Feb '25. Pt unable to provide prior level of function due to limited speech.

## 2025-04-24 NOTE — OCCUPATIONAL THERAPY INITIAL EVALUATION ADULT - DIAGNOSIS, OT EVAL
debility 2* Diagnostic cerebral angiogram + verapamil injection into left ICA debility s/p Diagnostic cerebral angiogram + verapamil injection into left ICA 2* to vasospasm

## 2025-04-24 NOTE — PHYSICAL THERAPY INITIAL EVALUATION ADULT - GAIT DISTANCE, PT EVAL
pt demonstrating mild impulsivity, c/.o dizziness after ambulating from bed to chair, drop in BP, see vitals flowsheet. Pt reclined in chair, BLE elevated, BP returned to within parameters/bed to chair

## 2025-04-24 NOTE — SPEECH LANGUAGE PATHOLOGY EVALUATION - SLP GENERAL OBSERVATIONS
oob to chair. minimally verbal. ideational apraxia observed during feeding. awake and alert, following commands (simple 1 step)

## 2025-04-24 NOTE — PHYSICAL THERAPY INITIAL EVALUATION ADULT - GAIT DEVIATIONS NOTED, PT EVAL
decreased lexi/increased time in double stance/decreased step length/decreased weight-shifting ability

## 2025-04-24 NOTE — OCCUPATIONAL THERAPY INITIAL EVALUATION ADULT - ORIENTATION, REHAB EVAL
nods yes head to [place]hem[place] and no to false name, not birthday, used alternate method of yes/no for remainder os questions unable to accurately identify/person

## 2025-04-24 NOTE — PHYSICAL THERAPY INITIAL EVALUATION ADULT - PERTINENT HX OF CURRENT PROBLEM, REHAB EVAL
,DirectAddress_Unknown
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 LKW 9:30pm yesterday, per family member, pt was speaking to her on the phone yesterday speaking in full sentences when he suddenly began to speak one word at a time. This morning, a different family member was speaking to him, states he was saying one word at a time and then suddenly stopped speaking entirely. NIHSS 10 upon arrival for severe aphasia/dysarthria as pt nonverbal, intermittently able to follow commands, baseline R facial droop, LOC questions. NI alert activated 14:02. CTH negative, CTA head and neck with focal occlusion of L MCA M2/M3, CTP with 41 mL of delayed perfusion in the left frontal lobe correlating with the CTA finding. No tnk as pt out of window. NI actual activated. During angiogram, no occlusion observed to perform thrombectomy on however diffuse ICAD vs. vasospasm was sen in the left ICA for which he received 10mg IA Verapamil with improvement in vessel narrowing  Post-op NIH 9. Exam improved to NIH 5 w/ BP augmentation at SBP 178mm Hg.Pt admitted to NSICU for BP augmentation following CVA.

## 2025-04-24 NOTE — PROGRESS NOTE ADULT - SUBJECTIVE AND OBJECTIVE BOX
Neuroendovascular Progress Note    PPD #1  S/P Diagnostic cerebral angiogram + verapamil injection into left ICA    HPI:  75 y/o M with PMHx of parkinsons, HTN, DM, gout, seizures on keppra, L bhatt radiate stroke in January 2025 on ASA 81mg and residual expressive aphasia/dysarthria/R facial droop, presenting to the ED with worsening word finding difficulty, now unable to speak at all. LKW 9:30pm yesterday, per family member, pt was speaking to her on the phone yesterday speaking in full sentences when he suddenly began to speak one word at a time. This morning, a different family member was speaking to him, states he was saying one word at a time and then suddenly stopped speaking entirely. NIHSS 10 upon arrival for severe aphasia/dysarthria as pt nonverbal, intermittently able to follow commands, baseline R facial droop, LOC questions. NI alert activated 14:02. CTH negative, CTA head and neck with focal occlusion of L MCA M2/M3, CTP with 41 mL of delayed perfusion in the left frontal lobe correlating with the CTA finding. No tnk as pt out of window. NI actual activated. During angiogram, no occlusion observed to perform thrombectomy on however diffuse ICAD vs. vasospasm was sen in the left ICA for which he received 10mg IA Verapamil with improvement in vessel narrowing  Post-op NIH 9. Exam improved to NIH 5 w/ BP augmentation at SBP 178mm Hg.Pt admitted to NSICU for BP augmentation following CVA.  -------------------------------------    Pt is PPD #1 S/P Diagnostic cerebral angiogram + verapamil injection into left ICA.  No major over night events, at present time pt is resting in bed on levo ggt for hemodynamic augmentation.  On exam pt is awake, severe expressive aphasia, intermittently nodding yes/no to questioning, + Rt facial, + tongue deviation, EOMI, no gaze preference, SALMERON x4 5/5, no upper or lower drift, SILT.  Right radial arteriotomy site CDI, flat, no s/s hematoma experiments warm with 2+ pulse           Admission scores:  NIH 10  mRS 1    (23 Apr 2025 18:03)      Parkinson disease    CVA (cerebral vascular accident)    HTN (hypertension)    Diabetes mellitus    Gout        24-Hour Events: None    Medication:  aspirin  chewable 81 milliGRAM(s) Oral daily  atorvastatin 80 milliGRAM(s) Oral at bedtime  carbidopa/levodopa CR 50/200 1 Tablet(s) Oral <User Schedule>  chlorhexidine 2% Cloths 1 Application(s) Topical <User Schedule>  dextrose 5%. 1000 milliLiter(s) IV Continuous <Continuous>  dextrose 5%. 1000 milliLiter(s) IV Continuous <Continuous>  dextrose 50% Injectable 25 Gram(s) IV Push once  dextrose 50% Injectable 12.5 Gram(s) IV Push once  dextrose 50% Injectable 25 Gram(s) IV Push once  glucagon  Injectable 1 milliGRAM(s) IntraMuscular once  insulin lispro (ADMELOG) corrective regimen sliding scale   SubCutaneous every 6 hours  norepinephrine Infusion 0.049 MICROgram(s)/kG/Min IV Continuous <Continuous>  polyethylene glycol 3350 17 Gram(s) Oral daily  senna 2 Tablet(s) Oral at bedtime  sodium chloride 0.9%. 1000 milliLiter(s) IV Continuous <Continuous>    penicillin (Unknown)  cefaclor (Unknown)  sulfa drugs (Unknown)      Recent Vitals:  T(F): 97.3 (04-24-25 @ 04:00), Max: 98.1 (04-23-25 @ 18:45)  HR: 85 (04-24-25 @ 07:00) (45 - 91)  BP: 179/92 (04-24-25 @ 07:00) (122/63 - 189/94)  RR: 18 (04-24-25 @ 07:00) (10 - 21)  SpO2: 96% (04-24-25 @ 07:00) (96% - 100%)    EVD: [ ] [place]: [ ]     ICP:     CPP:     Level(cm):     24hr(ml):     CSF:     W:     R:     C:     P:     G:     LA:          Recent Labs:                        16.0   14.34 )-----------( 177      ( 24 Apr 2025 04:34 )             45.6     04-24    139  |  106  |  14  ----------------------------<  135[H]  4.4   |  15[L]  |  0.8    Ca    8.4      24 Apr 2025 04:34  Phos  2.5     04-24  Mg     1.7     04-24    TPro  6.7  /  Alb  4.0  /  TBili  1.2  /  DBili  x   /  AST  44[H]  /  ALT  27  /  AlkPhos  101  04-24    PT/INR - ( 23 Apr 2025 14:12 )   PT: 11.50 sec;   INR: 0.98 ratio         PTT - ( 23 Apr 2025 14:12 )  PTT:31.1 sec  Urinalysis Basic - ( 24 Apr 2025 04:34 )    Color: x / Appearance: x / SG: x / pH: x  Gluc: 135 mg/dL / Ketone: x  / Bili: x / Urobili: x   Blood: x / Protein: x / Nitrite: x   Leuk Esterase: x / RBC: x / WBC x   Sq Epi: x / Non Sq Epi: x / Bacteria: x      LIVER FUNCTIONS - ( 24 Apr 2025 04:34 )  Alb: 4.0 g/dL / Pro: 6.7 g/dL / ALK PHOS: 101 U/L / ALT: 27 U/L / AST: 44 U/L / GGT: x             Exam:  General:   Mental Status: severe expressive aphasia, intermittently nodding no/yes to questioning (correctly answers yes or no to name, month, place). Following commands.   CN: PERRL, full visual fields, no nystagmus, EOMi, V1-V3 intact b/l, + Rt facial, + tongue deviation   Motor: 5/5 b/l UE/LE,  strength 5/5. Normal bulk and tone.  Sensation: SILT  Coordination: No dysmetria on finger-to-nose  Gait:   NIHSS:       Radiology:   CT Head No Cont:   ACC: [id number] EXAM:  CT BRAIN   ORDERED BY: JOHN PELAYO     PROCEDURE DATE:  04/24/2025          INTERPRETATION:  CLINICAL INDICATION: Follow-up of acute infarct.    TECHNIQUE: Axial CT scanning of the brain was obtained from the skull   baseto the vertex without the administration of intravenous contrast.   Reformatted coronal and sagittal images were subsequently obtained and   reviewed.    COMPARISON: 4/23/2025    FINDINGS:  There is no CT evidence of acute transcortical infarct. Age-related   involutional changes and chronic microvascular ischemic changes. Chronic   infarcts in the left corona radiata and stable chronic lacunar infarcts   in bilateral thalami.    There is no hydrocephalus, mass effect, or acute intracranial hemorrhage.   No extra-axial collection. Basal cisterns are patent.    The visualized paranasal sinuses and mastoid air cells are clear.    The calvarium is intact.    IMPRESSION:  No evidence of acute transcortical infarct, acute intracranial   hemorrhage, or mass effect.    --- End of Report ---            ALISSA CORTEZ MD; Attending Radiologist  This document has been electronically signed. Apr 24 2025  7:40AM (04-24-25 @ 06:09)          Assessment:   Suggestions:    3 y/o M with PMHx of parkinson's, HTN, DM, gout, seizures on keppra, L bhatt radiate stroke in January 2025 on ASA 81mg and residual expressive aphasia/dysarthria/R facial droop found with focal occlusion in the M2/M3 junction of the superior division of the left MCA now PPD #1 S/P Diagnostic cerebral angiogram + verapamil injection into left ICA      On Levo ggt for hemodynamic augmentation  SBP goals per NeuroICU, -180  Care per NeuroICU  No further Neuroendovascular plan at this time, recall if any concerns on this admission  Neuroendovascular sign off    Neuroendovascular Progress Note    PPD #1  S/P Diagnostic cerebral angiogram + verapamil injection into left ICA    HPI:  73 y/o M with PMHx of parkinsons, HTN, DM, gout, seizures on keppra, L bhatt radiate stroke in January 2025 on ASA 81mg and residual expressive aphasia/dysarthria/R facial droop, presenting to the ED with worsening word finding difficulty, now unable to speak at all. LKW 9:30pm yesterday, per family member, pt was speaking to her on the phone yesterday speaking in full sentences when he suddenly began to speak one word at a time. This morning, a different family member was speaking to him, states he was saying one word at a time and then suddenly stopped speaking entirely. NIHSS 10 upon arrival for severe aphasia/dysarthria as pt nonverbal, intermittently able to follow commands, baseline R facial droop, LOC questions. NI alert activated 14:02. CTH negative, CTA head and neck with focal occlusion of L MCA M2/M3, CTP with 41 mL of delayed perfusion in the left frontal lobe correlating with the CTA finding. No tnk as pt out of window. NI actual activated. During angiogram, no occlusion observed to perform thrombectomy on however diffuse ICAD vs. vasospasm was sen in the left ICA for which he received 10mg IA Verapamil with improvement in vessel narrowing  Post-op NIH 9. Exam improved to NIH 5 w/ BP augmentation at SBP 178mm Hg.Pt admitted to NSICU for BP augmentation following CVA.  -------------------------------------    Pt is PPD #1 S/P Diagnostic cerebral angiogram + verapamil injection into left ICA.  No major over night events, at present time pt is resting in bed on levo ggt for hemodynamic augmentation.  On exam pt is awake, severe expressive aphasia, intermittently nodding yes/no to questioning, + Rt facial, + tongue deviation, EOMI, no gaze preference, SALMERON x4 5/5, no upper or lower drift, SILT.  Right radial arteriotomy site CDI, flat, no s/s hematoma experiments warm with 2+ pulse           Admission scores:  NIH 10  mRS 1    (23 Apr 2025 18:03)      Parkinson disease    CVA (cerebral vascular accident)    HTN (hypertension)    Diabetes mellitus    Gout        24-Hour Events: None    Medication:  aspirin  chewable 81 milliGRAM(s) Oral daily  atorvastatin 80 milliGRAM(s) Oral at bedtime  carbidopa/levodopa CR 50/200 1 Tablet(s) Oral <User Schedule>  chlorhexidine 2% Cloths 1 Application(s) Topical <User Schedule>  dextrose 5%. 1000 milliLiter(s) IV Continuous <Continuous>  dextrose 5%. 1000 milliLiter(s) IV Continuous <Continuous>  dextrose 50% Injectable 25 Gram(s) IV Push once  dextrose 50% Injectable 12.5 Gram(s) IV Push once  dextrose 50% Injectable 25 Gram(s) IV Push once  glucagon  Injectable 1 milliGRAM(s) IntraMuscular once  insulin lispro (ADMELOG) corrective regimen sliding scale   SubCutaneous every 6 hours  norepinephrine Infusion 0.049 MICROgram(s)/kG/Min IV Continuous <Continuous>  polyethylene glycol 3350 17 Gram(s) Oral daily  senna 2 Tablet(s) Oral at bedtime  sodium chloride 0.9%. 1000 milliLiter(s) IV Continuous <Continuous>    penicillin (Unknown)  cefaclor (Unknown)  sulfa drugs (Unknown)      Recent Vitals:  T(F): 97.3 (04-24-25 @ 04:00), Max: 98.1 (04-23-25 @ 18:45)  HR: 85 (04-24-25 @ 07:00) (45 - 91)  BP: 179/92 (04-24-25 @ 07:00) (122/63 - 189/94)  RR: 18 (04-24-25 @ 07:00) (10 - 21)  SpO2: 96% (04-24-25 @ 07:00) (96% - 100%)    EVD: [ ] [place]: [ ]     ICP:     CPP:     Level(cm):     24hr(ml):     CSF:     W:     R:     C:     P:     G:     LA:          Recent Labs:                        16.0   14.34 )-----------( 177      ( 24 Apr 2025 04:34 )             45.6     04-24    139  |  106  |  14  ----------------------------<  135[H]  4.4   |  15[L]  |  0.8    Ca    8.4      24 Apr 2025 04:34  Phos  2.5     04-24  Mg     1.7     04-24    TPro  6.7  /  Alb  4.0  /  TBili  1.2  /  DBili  x   /  AST  44[H]  /  ALT  27  /  AlkPhos  101  04-24    PT/INR - ( 23 Apr 2025 14:12 )   PT: 11.50 sec;   INR: 0.98 ratio         PTT - ( 23 Apr 2025 14:12 )  PTT:31.1 sec  Urinalysis Basic - ( 24 Apr 2025 04:34 )    Color: x / Appearance: x / SG: x / pH: x  Gluc: 135 mg/dL / Ketone: x  / Bili: x / Urobili: x   Blood: x / Protein: x / Nitrite: x   Leuk Esterase: x / RBC: x / WBC x   Sq Epi: x / Non Sq Epi: x / Bacteria: x      LIVER FUNCTIONS - ( 24 Apr 2025 04:34 )  Alb: 4.0 g/dL / Pro: 6.7 g/dL / ALK PHOS: 101 U/L / ALT: 27 U/L / AST: 44 U/L / GGT: x             Exam:  General:   Mental Status: severe expressive aphasia, intermittently nodding no/yes to questioning (correctly answers yes or no to name, month, place). Following commands.   CN: PERRL, full visual fields, no nystagmus, EOMi, V1-V3 intact b/l, + Rt facial, + tongue deviation   Motor: 5/5 b/l UE/LE,  strength 5/5. Normal bulk and tone.  Sensation: SILT  Coordination: No dysmetria on finger-to-nose  Gait:   NIHSS:       Radiology:   CT Head No Cont:   ACC: [id number] EXAM:  CT BRAIN   ORDERED BY: JOHN PELAYO     PROCEDURE DATE:  04/24/2025          INTERPRETATION:  CLINICAL INDICATION: Follow-up of acute infarct.    TECHNIQUE: Axial CT scanning of the brain was obtained from the skull   baseto the vertex without the administration of intravenous contrast.   Reformatted coronal and sagittal images were subsequently obtained and   reviewed.    COMPARISON: 4/23/2025    FINDINGS:  There is no CT evidence of acute transcortical infarct. Age-related   involutional changes and chronic microvascular ischemic changes. Chronic   infarcts in the left corona radiata and stable chronic lacunar infarcts   in bilateral thalami.    There is no hydrocephalus, mass effect, or acute intracranial hemorrhage.   No extra-axial collection. Basal cisterns are patent.    The visualized paranasal sinuses and mastoid air cells are clear.    The calvarium is intact.    IMPRESSION:  No evidence of acute transcortical infarct, acute intracranial   hemorrhage, or mass effect.    --- End of Report ---            ALISSA CORTEZ MD; Attending Radiologist  This document has been electronically signed. Apr 24 2025  7:40AM (04-24-25 @ 06:09)          Assessment:   Suggestions:    73 y/o M with PMHx of parkinson's, HTN, DM, gout, seizures on keppra, L bhatt radiate stroke in January 2025 on ASA 81mg and residual expressive aphasia/dysarthria/R facial droop found with focal occlusion in the M2/M3 junction of the superior division of the left MCA now PPD #1 S/P Diagnostic cerebral angiogram + verapamil injection into left ICA      On Levo ggt for hemodynamic augmentation  SBP goals per NeuroICU, -180  Care per NeuroICU  No further Neuroendovascular plan at this time, recall if any concerns on this admission  Neuroendovascular sign off

## 2025-04-24 NOTE — PROGRESS NOTE ADULT - SUBJECTIVE AND OBJECTIVE BOX
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 LKW 9:30pm yesterday, per family member, pt was speaking to her on the phone yesterday speaking in full sentences when he suddenly began to speak one word at a time. This morning, a different family member was speaking to him, states he was saying one word at a time and then suddenly stopped speaking entirely. NIHSS 10 upon arrival for severe aphasia/dysarthria as pt nonverbal, intermittently able to follow commands, baseline R facial droop, LOC questions. NI alert activated 14:02. CTH negative, CTA head and neck with focal occlusion of L MCA M2/M3, CTP with 41 mL of delayed perfusion in the left frontal lobe correlating with the CTA finding. No tnk as pt out of window. NI actual activated. During angiogram, no occlusion observed to perform thrombectomy on however diffuse ICAD vs. vasospasm was sen in the left ICA for which he received 10mg IA Verapamil with improvement in vessel narrowing  Post-op NIH 9. Exam improved to NIH 5 w/ BP augmentation at SBP 178mm Hg.Pt admitted to NSICU for BP augmentation following CVA.    Admission scores:  NIH 10  mRS 1       OVERNIGHT EVENTS: remained on IV norepi for hemodynamic augmentation     ADMISSION SCORES:   nihss 9 preprocedure, 5 post procedure      REVIEW OF SYSTEMS: none    VITALS: [] Reviewed    IMAGING/DATA: [] Reviewed    IVF FLUIDS/MEDICATIONS: [] Reviewed    ALLERGIES: Allergies    penicillin (Unknown)  cefaclor (Unknown)  sulfa drugs (Unknown)    Intolerances        DEVICES:   [] Restraints [] PIVs [] ET tube [] central line [] PICC [] arterial line [] rios [] NGT/OGT [] EVD [] LD [] BLANCA/HMV [] LiCOX [] ICP monitor [] Trach [] PEG [] Chest Tube [] other:    EXAMINATION:  General: satup in chair and comfortable   HEENT: Anicteric sclerae  Cardiac: [id number]  Lungs: Clear  Abdomen: Soft, non-tender, +BS  Extremities: No c/c/e  Skin/Incision Site: Clean, dry and intact  Neurologic: Awake, alert,  aphasic expressive, oriented x2, follows on all four, chronic facial palsy, eomi, unable to asses vf, SALMERON spont AG no drift, mild dysarthria, no ataxia

## 2025-04-24 NOTE — OCCUPATIONAL THERAPY INITIAL EVALUATION ADULT - NS ASR FOLLOW COMMAND OT EVAL
delayed processing, sequencing, command following/75% of the time/able to follow single-step instructions delayed processing, sequencing, command following, ideational/ideamotor apraxia noted at times specifically with feeding, benefits from visual cues/demo/75% of the time/able to follow single-step instructions

## 2025-04-25 LAB
A1C WITH ESTIMATED AVERAGE GLUCOSE RESULT: 5.6 % — SIGNIFICANT CHANGE UP (ref 4–5.6)
ALBUMIN SERPL ELPH-MCNC: 3.6 G/DL — SIGNIFICANT CHANGE UP (ref 3.5–5.2)
ALP SERPL-CCNC: 94 U/L — SIGNIFICANT CHANGE UP (ref 30–115)
ALT FLD-CCNC: 12 U/L — SIGNIFICANT CHANGE UP (ref 0–41)
ANION GAP SERPL CALC-SCNC: 10 MMOL/L — SIGNIFICANT CHANGE UP (ref 7–14)
AST SERPL-CCNC: 32 U/L — SIGNIFICANT CHANGE UP (ref 0–41)
BASOPHILS # BLD AUTO: 0.03 K/UL — SIGNIFICANT CHANGE UP (ref 0–0.2)
BASOPHILS NFR BLD AUTO: 0.2 % — SIGNIFICANT CHANGE UP (ref 0–1)
BILIRUB SERPL-MCNC: 1.3 MG/DL — HIGH (ref 0.2–1.2)
BUN SERPL-MCNC: 9 MG/DL — LOW (ref 10–20)
CALCIUM SERPL-MCNC: 8.2 MG/DL — LOW (ref 8.4–10.5)
CHLORIDE SERPL-SCNC: 108 MMOL/L — SIGNIFICANT CHANGE UP (ref 98–110)
CO2 SERPL-SCNC: 23 MMOL/L — SIGNIFICANT CHANGE UP (ref 17–32)
CREAT SERPL-MCNC: 0.7 MG/DL — SIGNIFICANT CHANGE UP (ref 0.7–1.5)
EGFR: 98 ML/MIN/1.73M2 — SIGNIFICANT CHANGE UP
EGFR: 98 ML/MIN/1.73M2 — SIGNIFICANT CHANGE UP
EOSINOPHIL # BLD AUTO: 0.12 K/UL — SIGNIFICANT CHANGE UP (ref 0–0.7)
EOSINOPHIL NFR BLD AUTO: 0.9 % — SIGNIFICANT CHANGE UP (ref 0–8)
ESTIMATED AVERAGE GLUCOSE: 114 MG/DL — SIGNIFICANT CHANGE UP (ref 68–114)
GLUCOSE BLDC GLUCOMTR-MCNC: 116 MG/DL — HIGH (ref 70–99)
GLUCOSE BLDC GLUCOMTR-MCNC: 141 MG/DL — HIGH (ref 70–99)
GLUCOSE BLDC GLUCOMTR-MCNC: 175 MG/DL — HIGH (ref 70–99)
GLUCOSE BLDC GLUCOMTR-MCNC: 182 MG/DL — HIGH (ref 70–99)
GLUCOSE SERPL-MCNC: 133 MG/DL — HIGH (ref 70–99)
HCT VFR BLD CALC: 41.3 % — LOW (ref 42–52)
HGB BLD-MCNC: 14.8 G/DL — SIGNIFICANT CHANGE UP (ref 14–18)
IMM GRANULOCYTES NFR BLD AUTO: 0.4 % — HIGH (ref 0.1–0.3)
LYMPHOCYTES # BLD AUTO: 23.7 % — SIGNIFICANT CHANGE UP (ref 20.5–51.1)
LYMPHOCYTES # BLD AUTO: 3.02 K/UL — SIGNIFICANT CHANGE UP (ref 1.2–3.4)
MAGNESIUM SERPL-MCNC: 1.9 MG/DL — SIGNIFICANT CHANGE UP (ref 1.8–2.4)
MCHC RBC-ENTMCNC: 33.6 PG — HIGH (ref 27–31)
MCHC RBC-ENTMCNC: 35.8 G/DL — SIGNIFICANT CHANGE UP (ref 32–37)
MCV RBC AUTO: 93.9 FL — SIGNIFICANT CHANGE UP (ref 80–94)
MONOCYTES # BLD AUTO: 1.19 K/UL — HIGH (ref 0.1–0.6)
MONOCYTES NFR BLD AUTO: 9.3 % — SIGNIFICANT CHANGE UP (ref 1.7–9.3)
NEUTROPHILS # BLD AUTO: 8.35 K/UL — HIGH (ref 1.4–6.5)
NEUTROPHILS NFR BLD AUTO: 65.5 % — SIGNIFICANT CHANGE UP (ref 42.2–75.2)
NRBC BLD AUTO-RTO: 0 /100 WBCS — SIGNIFICANT CHANGE UP (ref 0–0)
PHOSPHATE SERPL-MCNC: 1.4 MG/DL — LOW (ref 2.1–4.9)
PLATELET # BLD AUTO: 150 K/UL — SIGNIFICANT CHANGE UP (ref 130–400)
PMV BLD: 9.3 FL — SIGNIFICANT CHANGE UP (ref 7.4–10.4)
POTASSIUM SERPL-MCNC: 3.8 MMOL/L — SIGNIFICANT CHANGE UP (ref 3.5–5)
POTASSIUM SERPL-SCNC: 3.8 MMOL/L — SIGNIFICANT CHANGE UP (ref 3.5–5)
PROT SERPL-MCNC: 5.9 G/DL — LOW (ref 6–8)
RBC # BLD: 4.4 M/UL — LOW (ref 4.7–6.1)
RBC # FLD: 11.9 % — SIGNIFICANT CHANGE UP (ref 11.5–14.5)
SODIUM SERPL-SCNC: 141 MMOL/L — SIGNIFICANT CHANGE UP (ref 135–146)
TROPONIN T, HIGH SENSITIVITY RESULT: 11 NG/L — SIGNIFICANT CHANGE UP (ref 6–21)
WBC # BLD: 12.76 K/UL — HIGH (ref 4.8–10.8)
WBC # FLD AUTO: 12.76 K/UL — HIGH (ref 4.8–10.8)

## 2025-04-25 PROCEDURE — 99232 SBSQ HOSP IP/OBS MODERATE 35: CPT

## 2025-04-25 PROCEDURE — 70551 MRI BRAIN STEM W/O DYE: CPT | Mod: 26

## 2025-04-25 PROCEDURE — 93291 INTERROG DEV EVAL SCRMS IP: CPT | Mod: 26

## 2025-04-25 RX ORDER — MAGNESIUM SULFATE 500 MG/ML
2 SYRINGE (ML) INJECTION ONCE
Refills: 0 | Status: COMPLETED | OUTPATIENT
Start: 2025-04-25 | End: 2025-04-25

## 2025-04-25 RX ORDER — SOD PHOS DI, MONO/K PHOS MONO 250 MG
1 TABLET ORAL EVERY 6 HOURS
Refills: 0 | Status: COMPLETED | OUTPATIENT
Start: 2025-04-25 | End: 2025-04-27

## 2025-04-25 RX ORDER — POLYETHYLENE GLYCOL 3350 17 G/17G
17 POWDER, FOR SOLUTION ORAL
Refills: 0 | Status: DISCONTINUED | OUTPATIENT
Start: 2025-04-25 | End: 2025-05-01

## 2025-04-25 RX ORDER — NOREPINEPHRINE BITARTRATE 8 MG
0.05 SOLUTION INTRAVENOUS
Qty: 8 | Refills: 0 | Status: DISCONTINUED | OUTPATIENT
Start: 2025-04-25 | End: 2025-04-26

## 2025-04-25 RX ORDER — SOD PHOS DI, MONO/K PHOS MONO 250 MG
1 TABLET ORAL
Refills: 0 | Status: DISCONTINUED | OUTPATIENT
Start: 2025-04-25 | End: 2025-04-25

## 2025-04-25 RX ADMIN — Medication 50 MILLILITER(S): at 09:42

## 2025-04-25 RX ADMIN — ENTACAPONE 200 MILLIGRAM(S): 200 TABLET, FILM COATED ORAL at 21:28

## 2025-04-25 RX ADMIN — Medication 20 MILLIEQUIVALENT(S): at 06:30

## 2025-04-25 RX ADMIN — ENTACAPONE 200 MILLIGRAM(S): 200 TABLET, FILM COATED ORAL at 12:01

## 2025-04-25 RX ADMIN — INSULIN LISPRO 2: 100 INJECTION, SOLUTION INTRAVENOUS; SUBCUTANEOUS at 21:49

## 2025-04-25 RX ADMIN — CLOPIDOGREL BISULFATE 75 MILLIGRAM(S): 75 TABLET, FILM COATED ORAL at 11:58

## 2025-04-25 RX ADMIN — ENOXAPARIN SODIUM 40 MILLIGRAM(S): 100 INJECTION SUBCUTANEOUS at 13:13

## 2025-04-25 RX ADMIN — Medication 1 PACKET(S): at 08:29

## 2025-04-25 RX ADMIN — NOREPINEPHRINE BITARTRATE 6.16 MICROGRAM(S)/KG/MIN: 8 SOLUTION at 09:31

## 2025-04-25 RX ADMIN — RIVASTIGMINE 1 PATCH: 13.3 PATCH, EXTENDED RELEASE TRANSDERMAL at 15:04

## 2025-04-25 RX ADMIN — INSULIN LISPRO 2: 100 INJECTION, SOLUTION INTRAVENOUS; SUBCUTANEOUS at 18:31

## 2025-04-25 RX ADMIN — Medication 1 TABLET(S): at 21:28

## 2025-04-25 RX ADMIN — Medication 2 TABLET(S): at 18:07

## 2025-04-25 RX ADMIN — Medication 2 TABLET(S): at 21:28

## 2025-04-25 RX ADMIN — Medication 25 GRAM(S): at 08:28

## 2025-04-25 RX ADMIN — ENTACAPONE 200 MILLIGRAM(S): 200 TABLET, FILM COATED ORAL at 06:12

## 2025-04-25 RX ADMIN — ENTACAPONE 200 MILLIGRAM(S): 200 TABLET, FILM COATED ORAL at 18:33

## 2025-04-25 RX ADMIN — POLYETHYLENE GLYCOL 3350 17 GRAM(S): 17 POWDER, FOR SOLUTION ORAL at 18:02

## 2025-04-25 RX ADMIN — Medication 81 MILLIGRAM(S): at 12:01

## 2025-04-25 RX ADMIN — Medication 1 APPLICATION(S): at 06:13

## 2025-04-25 RX ADMIN — RIVASTIGMINE 1 PATCH: 13.3 PATCH, EXTENDED RELEASE TRANSDERMAL at 13:00

## 2025-04-25 RX ADMIN — LEVETIRACETAM 500 MILLIGRAM(S): 10 INJECTION, SOLUTION INTRAVENOUS at 06:13

## 2025-04-25 RX ADMIN — MIRTAZAPINE 7.5 MILLIGRAM(S): 30 TABLET, FILM COATED ORAL at 21:28

## 2025-04-25 RX ADMIN — RIVASTIGMINE 1 PATCH: 13.3 PATCH, EXTENDED RELEASE TRANSDERMAL at 17:23

## 2025-04-25 RX ADMIN — Medication 2 TABLET(S): at 06:12

## 2025-04-25 RX ADMIN — Medication 1 PACKET(S): at 11:58

## 2025-04-25 RX ADMIN — POLYETHYLENE GLYCOL 3350 17 GRAM(S): 17 POWDER, FOR SOLUTION ORAL at 11:58

## 2025-04-25 RX ADMIN — Medication 1 PACKET(S): at 18:02

## 2025-04-25 RX ADMIN — Medication 2 TABLET(S): at 12:01

## 2025-04-25 RX ADMIN — LEVETIRACETAM 500 MILLIGRAM(S): 10 INJECTION, SOLUTION INTRAVENOUS at 18:02

## 2025-04-25 RX ADMIN — ATORVASTATIN CALCIUM 80 MILLIGRAM(S): 80 TABLET, FILM COATED ORAL at 21:28

## 2025-04-25 NOTE — DIETITIAN NUTRITION RISK NOTIFICATION - TREATMENT: THE FOLLOWING DIET HAS BEEN RECOMMENDED
Diet, Soft and Bite Sized:   Consistent Carbohydrate {Evening Snack} (CSTCHOSN)  DASH/TLC {Sodium & Cholesterol Restricted} (DASH)  Supplement Feeding Modality:  Oral  Glucerna Shake Cans or Servings Per Day:  1       Frequency:  Two Times a day (04-25-25 @ 12:19) [Active]

## 2025-04-25 NOTE — DIETITIAN INITIAL EVALUATION ADULT - NAME AND PHONE
[place] [id number] or TEAMS    Nutrition Interventions: Meals, snacks, supplements; Nutrition Monitoring: Diet order, PO intake, weights, labs, NFPF, body composition, BM and tolerance to medical food supplements

## 2025-04-25 NOTE — DIETITIAN INITIAL EVALUATION ADULT - OTHER INFO
Pertinent Medical Information: Per H&P, pt is a 75 y/o M with PMHx of parkinsons, HTN, DM, gout, seizures on keppra, L bhatt radiate stroke in January 2025 on ASA 81mg and residual expressive aphasia/dysarthria/R facial droop, presenting to the ED with worsening word finding difficulty, now unable to speak at all. CVA with severe ICAD vs. vasospasm s/p IA verapamil per MD note on 4/25.     Per SLP note on 24-Apr-2025, recommend soft and bite sized foods with thin liquids.    Pertinent Subjective Information: Pt reports consuming <50% of breakfast today. On 4/24, pt consumed % of meals per flow sheet. Tolerating diet texture/consistency well. No nausea or vomiting reported.     Weight Hx: Pt unable to state UBW. Current dosing weight is 66.8 KG. Previous admission weight was 70.3 KG on 4/14/25 per HIE. 4.9% unintentional weight loss in over 1 week. Pt meets weight criteria for malnutrition at this time.

## 2025-04-25 NOTE — CHART NOTE - NSCHARTNOTEFT_GEN_A_CORE
Device: internal loop recorder    Indication: cerebrovascular accident   Interrogation complete. Device functioning normally.     Mode: sensing  Dependent: N/A  AT/AF burden: 0%  MRI compatible : yes   Battery: good  Underlying Rhythm:  sinus rhythm     Events:   No    Recommendations: Routine follow up as outpatient with electrophysiology    EPS [id number]

## 2025-04-25 NOTE — PROGRESS NOTE ADULT - SUBJECTIVE AND OBJECTIVE BOX
SUMMARY:  75 y/o M with PMHx of parkinsons, HTN, DM, gout, seizures on keppra, L bhatt radiate stroke in January 2025 on ASA 81mg and residual expressive aphasia/dysarthria/R facial droop, presenting to the ED with worsening word finding difficulty, now unable to speak at all. LKW 9:30pm yesterday, per family member, pt was speaking to her on the phone yesterday speaking in full sentences when he suddenly began to speak one word at a time. This morning, a different family member was speaking to him, states he was saying one word at a time and then suddenly stopped speaking entirely. NIHSS 10 upon arrival for severe aphasia/dysarthria as pt nonverbal, intermittently able to follow commands, baseline R facial droop, LOC questions. NI alert activated 14:02. CTH negative, CTA head and neck with focal occlusion of L MCA M2/M3, CTP with 41 mL of delayed perfusion in the left frontal lobe correlating with the CTA finding. No tnk as pt out of window. NI actual activated. During angiogram, no occlusion observed to perform thrombectomy on however diffuse ICAD vs. vasospasm was sen in the left ICA for which he received 10mg IA Verapamil with improvement in vessel narrowing  Post-op NIH 9. Exam improved to NIH 5 w/ BP augmentation at SBP 178mm Hg.Pt admitted to NSICU for BP augmentation following CVA.    Admission scores:  NIH 10  mRS 1       OVERNIGHT EVENTS: remained on IV norepi for hemodynamic augmentation; nihss stable    ADMISSION SCORES:   nihss 9 preprocedure, 5 post procedure      REVIEW OF SYSTEMS: none    VITALS: [] Reviewed    IMAGING/DATA: [] Reviewed    IVF FLUIDS/MEDICATIONS: [] Reviewed    ALLERGIES: Allergies    penicillin (Unknown)  cefaclor (Unknown)  sulfa drugs (Unknown)    Intolerances        DEVICES:   [] Restraints [] PIVs [] ET tube [] central line [] PICC [] arterial line [] rios [] NGT/OGT [] EVD [] LD [] BLANCA/HMV [] LiCOX [] ICP monitor [] Trach [] PEG [] Chest Tube [] other:    EXAMINATION:  General: satup in chair and comfortable   HEENT: Anicteric sclerae  Cardiac: [id number]  Lungs: Clear  Abdomen: Soft, non-tender, +BS  Extremities: No c/c/e  Skin/Incision Site: Clean, dry and intact  Neurologic: Awake, alert, severe expressive aphasic , oriented x2 with choices, follows on all four, chronic R facial palsy, eomi, unable to asses vf, SALMERON spont AG, sublte RLE drift, minima verbal output, no ataxia

## 2025-04-25 NOTE — DIETITIAN INITIAL EVALUATION ADULT - PERTINENT LABORATORY DATA
04-25    141  |  108  |  9[L]  ----------------------------<  133[H]  3.8   |  23  |  0.7    Ca    8.2[L]      25 Apr 2025 05:00  Phos  1.4     04-25  Mg     1.9     04-25    TPro  5.9[L]  /  Alb  3.6  /  TBili  1.3[H]  /  DBili  x   /  AST  32  /  ALT  12  /  AlkPhos  94  04-25  POCT Blood Glucose.: 141 mg/dL (04-25-25 @ 11:57)  A1C with Estimated Average Glucose Result: 5.7 % (04-24-25 @ 04:34)  A1C with Estimated Average Glucose Result: 4.9 % (01-18-25 @ 06:41)  A1C with Estimated Average Glucose Result: 5.3 % (09-25-24 @ 06:34)

## 2025-04-25 NOTE — DIETITIAN INITIAL EVALUATION ADULT - ORAL INTAKE PTA/DIET HISTORY
Pt's speech noted to be garbled; incoherent; slurred per flow sheet and upon assessment this morning. Pt however able to answer yes or no questions appropriately. Pt reports having a good appetite prior to admit; consumed 2-3 meals daily. Pt takes a multivitamin, vitamin C, D, zinc, magnesium and B daily. Denies drinking protein shake supplements. NKFA; denies any restrictive cultural or [employer] food preferences. No chewing or swallowing difficulties reported with regular textured food pta.

## 2025-04-25 NOTE — DIETITIAN INITIAL EVALUATION ADULT - EDUCATION DIETARY MODIFICATIONS
Discussed importance of PO intake, current diet order and supplements to optimize kcal/pro intake./(2) meets goals/outcomes/verbalization

## 2025-04-25 NOTE — DIETITIAN INITIAL EVALUATION ADULT - ADD RECOMMEND
1. ADD Glucerna Shake 2X/DAILY to optimize kcal/pro intake -- provides 440 kcal, 20g pro total  2. Continue with current diet order   3. Encourage PO intake and assist during meals prn  4. Adjust insulin regimen prn    High risk f/u

## 2025-04-25 NOTE — DIETITIAN INITIAL EVALUATION ADULT - PERTINENT MEDS FT
MEDICATIONS  (STANDING):  aspirin  chewable 81 milliGRAM(s) Oral daily  atorvastatin 80 milliGRAM(s) Oral at bedtime  carbidopa/levodopa  25/250 2 Tablet(s) Oral <User Schedule>  carbidopa/levodopa CR 50/200 1 Tablet(s) Oral <User Schedule>  chlorhexidine 2% Cloths 1 Application(s) Topical <User Schedule>  clopidogrel Tablet 75 milliGRAM(s) Oral daily  dextrose 5%. 1000 milliLiter(s) (50 mL/Hr) IV Continuous <Continuous>  dextrose 5%. 1000 milliLiter(s) (100 mL/Hr) IV Continuous <Continuous>  dextrose 50% Injectable 25 Gram(s) IV Push once  dextrose 50% Injectable 12.5 Gram(s) IV Push once  dextrose 50% Injectable 25 Gram(s) IV Push once  enoxaparin Injectable 40 milliGRAM(s) SubCutaneous every 24 hours  entacapone 200 milliGRAM(s) Oral <User Schedule>  glucagon  Injectable 1 milliGRAM(s) IntraMuscular once  insulin lispro (ADMELOG) corrective regimen sliding scale   SubCutaneous Before meals and at bedtime  levETIRAcetam 500 milliGRAM(s) Oral two times a day  mirtazapine 7.5 milliGRAM(s) Oral at bedtime  norepinephrine Infusion 0.049 MICROgram(s)/kG/Min (6.16 mL/Hr) IV Continuous <Continuous>  polyethylene glycol 3350 17 Gram(s) Oral two times a day  potassium phosphate / sodium phosphate Powder (PHOS-NaK) 1 Packet(s) Oral every 6 hours  rivastigmine patch  9.5 mG/24 Hr(s) 1 Patch Transdermal every 24 hours  senna 2 Tablet(s) Oral at bedtime  sodium chloride 0.9%. 1000 milliLiter(s) (50 mL/Hr) IV Continuous <Continuous>    MEDICATIONS  (PRN):  acetaminophen     Tablet .. 650 milliGRAM(s) Oral every 6 hours PRN Temp greater or equal to 38C (100.4F), Mild Pain (1 - 3)  dextrose Oral Gel 15 Gram(s) Oral once PRN Blood Glucose LESS THAN 70 milliGRAM(s)/deciliter

## 2025-04-25 NOTE — DIETITIAN INITIAL EVALUATION ADULT - OTHER CALCULATIONS
Using ABW: ENERGY: [id number] kcal/day (25-30 kcal/kg); PROTEIN:  g/day (1.2-1.5 g/kg); FLUID: 1mL/kcal -- with consideration for age, BMI, spcm, critical illness setting

## 2025-04-25 NOTE — PROGRESS NOTE ADULT - ASSESSMENT
74M CVA with severe ICAD vs. vasospasm s/p IA verapamil    #CVA  ICAD    Plan:  #Neuro  -q1 NIH  -c/w ASA 81, plavix   pru prior to plavix administration was 148 (pt likely on plavix outpatient) and does not appear resistant  -lipitor 80mg qd  -CTH - no acuity   MR brain   -c/w  parkinson's medication  -c/w keppra 500mg BID  consider EEG if no improvement  -analgesia: tylenol prn  -stroke core measures  hemodynamic augmentation for exam optimization  -PT/OT/Speech  bedrest given pressor requirement     #Pulm  -SpO2 >94%  -IS    #CV  --180, trial slowly at deescalated sbp  -levo gtt   obtain troponin  -hold home anti-hypertensives  -telemetry  -TTE - preserved biventricular systolic function; grade 1 DD, mild AS     #GI  passed dysphagia  dash/ccd modified   -bowel regimen: senna/miralax  -LBM - pta    #/Renal  normonatremic 135-145  IVF for lesser PO intake  -K >4, Mg >2  -replete lytes as needed    #Endo  -euglycemia 140-180  -mod ISS  LDL 44  a1c 5.7  tsh 0.88    #Heme  -SCDs  chemoppx with SQL  dopplers of b/l LEs negative    #ID  -afebrile, normothermic      dispo- NSICU

## 2025-04-25 NOTE — DIETITIAN INITIAL EVALUATION ADULT - NS FNS DIET ORDER
Diet, Soft and Bite Sized:   Consistent Carbohydrate {Evening Snack} (CSTCHOSN)  DASH/TLC {Sodium & Cholesterol Restricted} (DASH)  Supplement Feeding Modality:  Oral  Glucerna Shake Cans or Servings Per Day:  1       Frequency:  Two Times a day (04-25-25 @ 12:19)

## 2025-04-26 LAB
ALBUMIN SERPL ELPH-MCNC: 3.7 G/DL — SIGNIFICANT CHANGE UP (ref 3.5–5.2)
ALP SERPL-CCNC: 103 U/L — SIGNIFICANT CHANGE UP (ref 30–115)
ALT FLD-CCNC: 18 U/L — SIGNIFICANT CHANGE UP (ref 0–41)
ANION GAP SERPL CALC-SCNC: 11 MMOL/L — SIGNIFICANT CHANGE UP (ref 7–14)
AST SERPL-CCNC: 50 U/L — HIGH (ref 0–41)
BASOPHILS # BLD AUTO: 0.03 K/UL — SIGNIFICANT CHANGE UP (ref 0–0.2)
BASOPHILS NFR BLD AUTO: 0.2 % — SIGNIFICANT CHANGE UP (ref 0–1)
BILIRUB SERPL-MCNC: 1.1 MG/DL — SIGNIFICANT CHANGE UP (ref 0.2–1.2)
BUN SERPL-MCNC: 9 MG/DL — LOW (ref 10–20)
CALCIUM SERPL-MCNC: 8.6 MG/DL — SIGNIFICANT CHANGE UP (ref 8.4–10.5)
CHLORIDE SERPL-SCNC: 105 MMOL/L — SIGNIFICANT CHANGE UP (ref 98–110)
CO2 SERPL-SCNC: 24 MMOL/L — SIGNIFICANT CHANGE UP (ref 17–32)
CREAT SERPL-MCNC: 0.9 MG/DL — SIGNIFICANT CHANGE UP (ref 0.7–1.5)
EGFR: 91 ML/MIN/1.73M2 — SIGNIFICANT CHANGE UP
EGFR: 91 ML/MIN/1.73M2 — SIGNIFICANT CHANGE UP
EOSINOPHIL # BLD AUTO: 0.22 K/UL — SIGNIFICANT CHANGE UP (ref 0–0.7)
EOSINOPHIL NFR BLD AUTO: 1.6 % — SIGNIFICANT CHANGE UP (ref 0–8)
GLUCOSE BLDC GLUCOMTR-MCNC: 130 MG/DL — HIGH (ref 70–99)
GLUCOSE BLDC GLUCOMTR-MCNC: 131 MG/DL — HIGH (ref 70–99)
GLUCOSE BLDC GLUCOMTR-MCNC: 132 MG/DL — HIGH (ref 70–99)
GLUCOSE BLDC GLUCOMTR-MCNC: 137 MG/DL — HIGH (ref 70–99)
GLUCOSE SERPL-MCNC: 122 MG/DL — HIGH (ref 70–99)
HCT VFR BLD CALC: 42.8 % — SIGNIFICANT CHANGE UP (ref 42–52)
HGB BLD-MCNC: 15.1 G/DL — SIGNIFICANT CHANGE UP (ref 14–18)
IMM GRANULOCYTES NFR BLD AUTO: 0.4 % — HIGH (ref 0.1–0.3)
LYMPHOCYTES # BLD AUTO: 26.4 % — SIGNIFICANT CHANGE UP (ref 20.5–51.1)
LYMPHOCYTES # BLD AUTO: 3.66 K/UL — HIGH (ref 1.2–3.4)
MAGNESIUM SERPL-MCNC: 2 MG/DL — SIGNIFICANT CHANGE UP (ref 1.8–2.4)
MCHC RBC-ENTMCNC: 33.3 PG — HIGH (ref 27–31)
MCHC RBC-ENTMCNC: 35.3 G/DL — SIGNIFICANT CHANGE UP (ref 32–37)
MCV RBC AUTO: 94.5 FL — HIGH (ref 80–94)
MONOCYTES # BLD AUTO: 1.39 K/UL — HIGH (ref 0.1–0.6)
MONOCYTES NFR BLD AUTO: 10 % — HIGH (ref 1.7–9.3)
NEUTROPHILS # BLD AUTO: 8.52 K/UL — HIGH (ref 1.4–6.5)
NEUTROPHILS NFR BLD AUTO: 61.4 % — SIGNIFICANT CHANGE UP (ref 42.2–75.2)
NRBC BLD AUTO-RTO: 0 /100 WBCS — SIGNIFICANT CHANGE UP (ref 0–0)
PHOSPHATE SERPL-MCNC: 2.4 MG/DL — SIGNIFICANT CHANGE UP (ref 2.1–4.9)
PLATELET # BLD AUTO: 154 K/UL — SIGNIFICANT CHANGE UP (ref 130–400)
PMV BLD: 9.3 FL — SIGNIFICANT CHANGE UP (ref 7.4–10.4)
POTASSIUM SERPL-MCNC: 4.2 MMOL/L — SIGNIFICANT CHANGE UP (ref 3.5–5)
POTASSIUM SERPL-SCNC: 4.2 MMOL/L — SIGNIFICANT CHANGE UP (ref 3.5–5)
PROT SERPL-MCNC: 6.3 G/DL — SIGNIFICANT CHANGE UP (ref 6–8)
RBC # BLD: 4.53 M/UL — LOW (ref 4.7–6.1)
RBC # FLD: 12 % — SIGNIFICANT CHANGE UP (ref 11.5–14.5)
SODIUM SERPL-SCNC: 140 MMOL/L — SIGNIFICANT CHANGE UP (ref 135–146)
WBC # BLD: 13.88 K/UL — HIGH (ref 4.8–10.8)
WBC # FLD AUTO: 13.88 K/UL — HIGH (ref 4.8–10.8)

## 2025-04-26 PROCEDURE — 95720 EEG PHY/QHP EA INCR W/VEEG: CPT

## 2025-04-26 PROCEDURE — 99232 SBSQ HOSP IP/OBS MODERATE 35: CPT

## 2025-04-26 RX ORDER — NOREPINEPHRINE BITARTRATE 8 MG
0.05 SOLUTION INTRAVENOUS
Qty: 8 | Refills: 0 | Status: DISCONTINUED | OUTPATIENT
Start: 2025-04-26 | End: 2025-04-27

## 2025-04-26 RX ORDER — LORAZEPAM 4 MG/ML
2 VIAL (ML) INJECTION ONCE
Refills: 0 | Status: DISCONTINUED | OUTPATIENT
Start: 2025-04-26 | End: 2025-04-26

## 2025-04-26 RX ORDER — MAGNESIUM HYDROXIDE 400 MG/5ML
15 SUSPENSION ORAL EVERY 8 HOURS
Refills: 0 | Status: COMPLETED | OUTPATIENT
Start: 2025-04-26 | End: 2025-04-28

## 2025-04-26 RX ADMIN — Medication 2 TABLET(S): at 17:47

## 2025-04-26 RX ADMIN — ENTACAPONE 200 MILLIGRAM(S): 200 TABLET, FILM COATED ORAL at 17:06

## 2025-04-26 RX ADMIN — CLOPIDOGREL BISULFATE 75 MILLIGRAM(S): 75 TABLET, FILM COATED ORAL at 11:57

## 2025-04-26 RX ADMIN — Medication 1 TABLET(S): at 22:26

## 2025-04-26 RX ADMIN — RIVASTIGMINE 1 PATCH: 13.3 PATCH, EXTENDED RELEASE TRANSDERMAL at 13:32

## 2025-04-26 RX ADMIN — Medication 1 PACKET(S): at 23:03

## 2025-04-26 RX ADMIN — ENTACAPONE 200 MILLIGRAM(S): 200 TABLET, FILM COATED ORAL at 21:53

## 2025-04-26 RX ADMIN — POLYETHYLENE GLYCOL 3350 17 GRAM(S): 17 POWDER, FOR SOLUTION ORAL at 17:22

## 2025-04-26 RX ADMIN — Medication 81 MILLIGRAM(S): at 11:57

## 2025-04-26 RX ADMIN — Medication 1 PACKET(S): at 06:09

## 2025-04-26 RX ADMIN — Medication 1 PACKET(S): at 17:23

## 2025-04-26 RX ADMIN — Medication 2 TABLET(S): at 06:09

## 2025-04-26 RX ADMIN — ENOXAPARIN SODIUM 40 MILLIGRAM(S): 100 INJECTION SUBCUTANEOUS at 13:32

## 2025-04-26 RX ADMIN — ATORVASTATIN CALCIUM 80 MILLIGRAM(S): 80 TABLET, FILM COATED ORAL at 21:53

## 2025-04-26 RX ADMIN — MIRTAZAPINE 7.5 MILLIGRAM(S): 30 TABLET, FILM COATED ORAL at 21:53

## 2025-04-26 RX ADMIN — MAGNESIUM HYDROXIDE 15 MILLILITER(S): 400 SUSPENSION ORAL at 13:35

## 2025-04-26 RX ADMIN — Medication 1 PACKET(S): at 03:19

## 2025-04-26 RX ADMIN — LEVETIRACETAM 500 MILLIGRAM(S): 10 INJECTION, SOLUTION INTRAVENOUS at 06:09

## 2025-04-26 RX ADMIN — ENTACAPONE 200 MILLIGRAM(S): 200 TABLET, FILM COATED ORAL at 11:59

## 2025-04-26 RX ADMIN — MAGNESIUM HYDROXIDE 15 MILLILITER(S): 400 SUSPENSION ORAL at 21:53

## 2025-04-26 RX ADMIN — Medication 2 TABLET(S): at 21:53

## 2025-04-26 RX ADMIN — Medication 1 APPLICATION(S): at 06:09

## 2025-04-26 RX ADMIN — RIVASTIGMINE 1 PATCH: 13.3 PATCH, EXTENDED RELEASE TRANSDERMAL at 15:37

## 2025-04-26 RX ADMIN — ENTACAPONE 200 MILLIGRAM(S): 200 TABLET, FILM COATED ORAL at 06:09

## 2025-04-26 RX ADMIN — POLYETHYLENE GLYCOL 3350 17 GRAM(S): 17 POWDER, FOR SOLUTION ORAL at 06:09

## 2025-04-26 RX ADMIN — Medication 1 PACKET(S): at 11:58

## 2025-04-26 RX ADMIN — Medication 2 TABLET(S): at 11:58

## 2025-04-26 RX ADMIN — LEVETIRACETAM 500 MILLIGRAM(S): 10 INJECTION, SOLUTION INTRAVENOUS at 17:23

## 2025-04-26 NOTE — PROGRESS NOTE ADULT - SUBJECTIVE AND OBJECTIVE BOX
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 LKW 9:30pm yesterday, per family member, pt was speaking to her on the phone yesterday speaking in full sentences when he suddenly began to speak one word at a time. This morning, a different family member was speaking to him, states he was saying one word at a time and then suddenly stopped speaking entirely. NIHSS 10 upon arrival for severe aphasia/dysarthria as pt nonverbal, intermittently able to follow commands, baseline R facial droop, LOC questions. NI alert activated 14:02. CTH negative, CTA head and neck with focal occlusion of L MCA M2/M3, CTP with 41 mL of delayed perfusion in the left frontal lobe correlating with the CTA finding. No tnk as pt out of window. NI actual activated. During angiogram, no occlusion observed to perform thrombectomy on however diffuse ICAD vs. vasospasm was sen in the left ICA for which he received 10mg IA Verapamil with improvement in vessel narrowing  Post-op NIH 9. Exam improved to NIH 5 w/ BP augmentation at SBP 178mm Hg.Pt admitted to NSICU for BP augmentation following CVA.    Admission scores:  NIH 10  mRS 1       OVERNIGHT EVENTS: remained on IV norepi for hemodynamic augmentation; nihss stable 5-6    ADMISSION SCORES:   nihss 9 preprocedure, 5 post procedure      REVIEW OF SYSTEMS: none    VITALS: [] Reviewed    IMAGING/DATA: [] Reviewed    IVF FLUIDS/MEDICATIONS: [] Reviewed    ALLERGIES: Allergies    penicillin (Unknown)  cefaclor (Unknown)  sulfa drugs (Unknown)    Intolerances        DEVICES:   [] Restraints [] PIVs [] ET tube [] central line [] PICC [] arterial line [] rios [] NGT/OGT [] EVD [] LD [] BLANCA/HMV [] LiCOX [] ICP monitor [] Trach [] PEG [] Chest Tube [] other:    EXAMINATION:  General: satup in chair and comfortable   HEENT: Anicteric sclerae  Cardiac: [id number]  Lungs: Clear  Abdomen: Soft, non-tender, +BS  Extremities: No c/c/e  Skin/Incision Site: Clean, dry and intact  Neurologic: Awake, alert and following more briskly  severe expressive aphasic (more verbal today although paucity of output noted), oriented x3 with choices , follows on all four, chronic R facial palsy, eomi, unable to asses vf, SALMERON spont AG, no drift, no ataxia, mild tactile extinction

## 2025-04-26 NOTE — PROGRESS NOTE ADULT - ASSESSMENT
74M CVA with severe ICAD vs. vasospasm s/p IA verapamil    #CVA  ICAD    Plan:  #Neuro  -q1 NIH; protected sleep if stable  -c/w ASA 81, plavix   pru prior to plavix administration was 148 (pt likely on plavix outpatient) and does not appear resistant  -lipitor 80mg qd  -CTH - no acuity   MR brain - Scattered multiple foci of acute infarcts in multiple vascular   territories as described. No mass effect or midline shift.  Multiple tiny foci showing signal dropout/blooming artifact on   susceptibility weighted images at right subinsular region, bilateral   thalami, right basal ganglia, bilateral medial temporal lobes and left   cerebellum, probably representing microhemorrhages.  -c/w  parkinson's medication  -c/w keppra 500mg BID  if EEg negative, can dc later today  -analgesia: tylenol prn  -stroke core measures  hemodynamic augmentation for exam optimization  -PT/OT/Speech  oobtc with assist/orthostatics    #Pulm  -SpO2 >94%  -IS    #CV  --180, trial slowly at deescalated sbp and monitor nihss to see if tolerates  -levo gtt   troponin stable, ekg stable, ROS negative for ischemia  -hold home anti-hypertensives  -telemetry  ILR interrogated no AFib  -TTE - preserved biventricular systolic function; grade 1 DD, mild AS     #GI  passed dysphagia  dash/ccd modified   -bowel regimen: senna/miralax  -LBM - pta    #/Renal  normonatremic 135-145  IVL  -K >4, Mg >2  -replete lytes as needed    #Endo  -euglycemia 140-180  iss ac/hs  LDL 44  a1c 5.7  tsh 0.88    #Heme  -SCDs  chemoppx with SQL  dopplers of b/l LEs negative    #ID  -afebrile, normothermic    PIVs    dispo- NSICU

## 2025-04-26 NOTE — EEG REPORT - NS EEG TEXT BOX
ZAKIA MILLS [id number]     Study Date: 04-26-25 03:34-11:19  Duration: 6 hr 19 min  --------------------------------------------------------------------------------------------------  History:  CC/ HPI Patient is a 74y old  Male who presents with a chief complaint of Cerebral infarction     (25 Apr 2025 12:21)    MEDICATIONS  (STANDING):  aspirin  chewable 81 milliGRAM(s) Oral daily  atorvastatin 80 milliGRAM(s) Oral at bedtime  carbidopa/levodopa  25/250 2 Tablet(s) Oral <User Schedule>  carbidopa/levodopa CR 50/200 1 Tablet(s) Oral <User Schedule>  chlorhexidine 2% Cloths 1 Application(s) Topical <User Schedule>  clopidogrel Tablet 75 milliGRAM(s) Oral daily  dextrose 5%. 1000 milliLiter(s) (50 mL/Hr) IV Continuous <Continuous>  dextrose 5%. 1000 milliLiter(s) (100 mL/Hr) IV Continuous <Continuous>  dextrose 50% Injectable 25 Gram(s) IV Push once  dextrose 50% Injectable 12.5 Gram(s) IV Push once  dextrose 50% Injectable 25 Gram(s) IV Push once  enoxaparin Injectable 40 milliGRAM(s) SubCutaneous every 24 hours  entacapone 200 milliGRAM(s) Oral <User Schedule>  glucagon  Injectable 1 milliGRAM(s) IntraMuscular once  insulin lispro (ADMELOG) corrective regimen sliding scale   SubCutaneous Before meals and at bedtime  levETIRAcetam 500 milliGRAM(s) Oral two times a day  mirtazapine 7.5 milliGRAM(s) Oral at bedtime  norepinephrine Infusion 0.049 MICROgram(s)/kG/Min (6.16 mL/Hr) IV Continuous <Continuous>  polyethylene glycol 3350 17 Gram(s) Oral two times a day  potassium phosphate / sodium phosphate Powder (PHOS-NaK) 1 Packet(s) Oral every 6 hours  rivastigmine patch  9.5 mG/24 Hr(s) 1 Patch Transdermal every 24 hours  senna 2 Tablet(s) Oral at bedtime  sodium chloride 0.9%. 1000 milliLiter(s) (50 mL/Hr) IV Continuous <Continuous>    --------------------------------------------------------------------------------------------------  Study Interpretation:    [[[Abbreviation Key:  PDR=alpha rhythm/posterior dominant rhythm. A-P=anterior posterior.  Amplitude: ‘very low’:<20; ‘low’:20-49; ‘medium’:; ‘high’:>150uV.  Persistence for periodic/rhythmic patterns (% of epoch) ‘rare’:<1%; ‘occasional’:1-10%; ‘frequent’:10-50%; ‘abundant’:50-90%; ‘continuous’:>90%.  Persistence for sporadic discharges: ‘rare’:<1/hr; ‘occasional’:1/min-1/hr; ‘frequent’:>1/min; ‘abundant’:>1/10 sec.  RPP=rhythmic and periodic patterns; GRDA=generalized rhythmic delta activity; FIRDA=frontal intermittent GRDA; LRDA=lateralized rhythmic delta activity; TIRDA=temporal intermittent rhythmic delta activity;  LPD=PLED=lateralized periodic discharges; GPD=generalized periodic discharges; BIPDs =bilateral independent periodic discharges; Mf=multifocal; SIRPDs=stimulus induced rhythmic, periodic, or ictal appearing discharges; BIRDs=brief potentially ictal rhythmic discharges >4 Hz, lasting .5-10s; PFA (paroxysmal bursts >13 Hz or =8 Hz <10s).  Modifiers: +F=with fast component; +S=with spike component; +R=with rhythmic component.  S-B=burst suppression pattern.  Max=maximal. N1-drowsy; N2-stage II sleep; N3-slow wave sleep. SSS/BETS=small sharp spikes/benign epileptiform transients of sleep. HV=hyperventilation; PS=photic stimulation]]]    Daily EEG Visual Analysis    FINDINGS:      Background:  Continuity: Continuous  Symmetry: Symmetric  Posterior dominant rhythm (PDR): Intermittent, 7 Hz  Voltage: Normal  Anterior-Posterior Gradient: Present, low-amplitude frontal beta  Other background findings: Abundant diffuse polymorphic theta slowing  Breach: Absent    Background Slowing:  Generalized slowing: As above  Focal slowing: Frequent left hemispheric frontotemporally predominant focal polymorphic delta slowing    State Changes:   Drowsiness is characterized by slowing of the background activity.  Stage 2 sleep is characterized by symmetric K complexes and sleep spindles.     Interictal Findings:  None    Electrographic and Electroclinical seizures:  None    Other Clinical Events:  None    Activation Procedures:   Hyperventilation is not performed.    Photic stimulation is not performed.    Artifacts:  Intermittent myogenic and movement artifacts are present.    Single-lead EKG: Regular rhythm. At times limited by artifact.    EEG Classification / Summary:  Abnormal EEG in the awake, drowsy, and asleep states.   -Frequent left hemispheric frontotemporally predominant focal slowing  -Mild-moderate diffuse slowing  -No epileptiform abnormalities or seizures captured.     Clinical Impression:  -Left hemispheric frontotemporally predominant focal cerebral dysfunction can be structural or functional in etiology.  -Mild-moderate diffuse cerebral dysfunction is nonspecific in etiology.   -No epileptiform abnormalities or seizures.          -------------------------------------------------------------------------------------------------------    Quiana Dougherty MD  Attending Physician, [facility]    -------------------------------------------------------------------------------------------------------    To reach EEG technologist:  Please use the pager number for the appropriate hospital or contact the .  At [facility] - Pager #: [phone]    To reach EEG-reading physician:  EEG Reading Room Phone #: [phone]  Epilepsy Answering Service after 5PM and before 8:30AM: Phone #: [phone]     ZAKIA MILLS [id number]     Study Date: 04-26-25 03:34-13:54 and 15:50-17:48 (Patient removes electrodes at 13:54. Electrodes are reconnected at 15:50.)  Duration: 10 hr 26 min  --------------------------------------------------------------------------------------------------  History:  CC/ HPI Patient is a 74y old  Male who presents with a chief complaint of Cerebral infarction     (25 Apr 2025 12:21)    MEDICATIONS  (STANDING):  aspirin  chewable 81 milliGRAM(s) Oral daily  atorvastatin 80 milliGRAM(s) Oral at bedtime  carbidopa/levodopa  25/250 2 Tablet(s) Oral <User Schedule>  carbidopa/levodopa CR 50/200 1 Tablet(s) Oral <User Schedule>  chlorhexidine 2% Cloths 1 Application(s) Topical <User Schedule>  clopidogrel Tablet 75 milliGRAM(s) Oral daily  dextrose 5%. 1000 milliLiter(s) (50 mL/Hr) IV Continuous <Continuous>  dextrose 5%. 1000 milliLiter(s) (100 mL/Hr) IV Continuous <Continuous>  dextrose 50% Injectable 25 Gram(s) IV Push once  dextrose 50% Injectable 12.5 Gram(s) IV Push once  dextrose 50% Injectable 25 Gram(s) IV Push once  enoxaparin Injectable 40 milliGRAM(s) SubCutaneous every 24 hours  entacapone 200 milliGRAM(s) Oral <User Schedule>  glucagon  Injectable 1 milliGRAM(s) IntraMuscular once  insulin lispro (ADMELOG) corrective regimen sliding scale   SubCutaneous Before meals and at bedtime  levETIRAcetam 500 milliGRAM(s) Oral two times a day  mirtazapine 7.5 milliGRAM(s) Oral at bedtime  norepinephrine Infusion 0.049 MICROgram(s)/kG/Min (6.16 mL/Hr) IV Continuous <Continuous>  polyethylene glycol 3350 17 Gram(s) Oral two times a day  potassium phosphate / sodium phosphate Powder (PHOS-NaK) 1 Packet(s) Oral every 6 hours  rivastigmine patch  9.5 mG/24 Hr(s) 1 Patch Transdermal every 24 hours  senna 2 Tablet(s) Oral at bedtime  sodium chloride 0.9%. 1000 milliLiter(s) (50 mL/Hr) IV Continuous <Continuous>    --------------------------------------------------------------------------------------------------  Study Interpretation:    [[[Abbreviation Key:  PDR=alpha rhythm/posterior dominant rhythm. A-P=anterior posterior.  Amplitude: ‘very low’:<20; ‘low’:20-49; ‘medium’:; ‘high’:>150uV.  Persistence for periodic/rhythmic patterns (% of epoch) ‘rare’:<1%; ‘occasional’:1-10%; ‘frequent’:10-50%; ‘abundant’:50-90%; ‘continuous’:>90%.  Persistence for sporadic discharges: ‘rare’:<1/hr; ‘occasional’:1/min-1/hr; ‘frequent’:>1/min; ‘abundant’:>1/10 sec.  RPP=rhythmic and periodic patterns; GRDA=generalized rhythmic delta activity; FIRDA=frontal intermittent GRDA; LRDA=lateralized rhythmic delta activity; TIRDA=temporal intermittent rhythmic delta activity;  LPD=PLED=lateralized periodic discharges; GPD=generalized periodic discharges; BIPDs =bilateral independent periodic discharges; Mf=multifocal; SIRPDs=stimulus induced rhythmic, periodic, or ictal appearing discharges; BIRDs=brief potentially ictal rhythmic discharges >4 Hz, lasting .5-10s; PFA (paroxysmal bursts >13 Hz or =8 Hz <10s).  Modifiers: +F=with fast component; +S=with spike component; +R=with rhythmic component.  S-B=burst suppression pattern.  Max=maximal. N1-drowsy; N2-stage II sleep; N3-slow wave sleep. SSS/BETS=small sharp spikes/benign epileptiform transients of sleep. HV=hyperventilation; PS=photic stimulation]]]    Daily EEG Visual Analysis    FINDINGS:      Background:  Continuity: Continuous  Symmetry: Symmetric  Posterior dominant rhythm (PDR): Intermittent, 7 Hz  Voltage: Normal  Anterior-Posterior Gradient: Present, low-amplitude frontal beta  Other background findings: Frequent diffuse polymorphic theta slowing  Breach: Absent    Background Slowing:  Generalized slowing: As above  Focal slowing: Frequent left hemispheric frontotemporally predominant focal polymorphic delta slowing    State Changes:   Drowsiness is characterized by slowing of the background activity.  Stage 2 sleep is characterized by symmetric K complexes and sleep spindles.     Interictal Findings:  None    Electrographic and Electroclinical seizures:  None    Other Clinical Events:  None    Activation Procedures:   Hyperventilation is not performed.    Photic stimulation is not performed.    Artifacts:  Intermittent myogenic and movement artifacts are present.    Single-lead EKG: Regular rhythm. At times limited by artifact.    EEG Classification / Summary:  Abnormal EEG in the awake, drowsy, and asleep states.   -Frequent left hemispheric frontotemporally predominant focal slowing  -Mild diffuse slowing  -No epileptiform abnormalities or seizures captured.     Clinical Impression:  -Left hemispheric frontotemporally predominant focal cerebral dysfunction can be structural or functional in etiology.  -Mild diffuse cerebral dysfunction is nonspecific in etiology.   -No epileptiform abnormalities or seizures.          -------------------------------------------------------------------------------------------------------    Quiana Dougherty MD  Attending Physician, [facility]    -------------------------------------------------------------------------------------------------------    To reach EEG technologist:  Please use the pager number for the appropriate hospital or contact the .  At [facility] - Pager #: [phone]    To reach EEG-reading physician:  EEG Reading Room Phone #: [phone]  Epilepsy Answering Service after 5PM and before 8:30AM: Phone #: [phone]

## 2025-04-27 LAB
ALBUMIN SERPL ELPH-MCNC: 3.5 G/DL — SIGNIFICANT CHANGE UP (ref 3.5–5.2)
ALP SERPL-CCNC: 107 U/L — SIGNIFICANT CHANGE UP (ref 30–115)
ALT FLD-CCNC: 22 U/L — SIGNIFICANT CHANGE UP (ref 0–41)
ANION GAP SERPL CALC-SCNC: 12 MMOL/L — SIGNIFICANT CHANGE UP (ref 7–14)
AST SERPL-CCNC: 51 U/L — HIGH (ref 0–41)
BASOPHILS # BLD AUTO: 0.03 K/UL — SIGNIFICANT CHANGE UP (ref 0–0.2)
BASOPHILS NFR BLD AUTO: 0.3 % — SIGNIFICANT CHANGE UP (ref 0–1)
BILIRUB SERPL-MCNC: 0.9 MG/DL — SIGNIFICANT CHANGE UP (ref 0.2–1.2)
BUN SERPL-MCNC: 12 MG/DL — SIGNIFICANT CHANGE UP (ref 10–20)
CALCIUM SERPL-MCNC: 8.8 MG/DL — SIGNIFICANT CHANGE UP (ref 8.4–10.5)
CHLORIDE SERPL-SCNC: 102 MMOL/L — SIGNIFICANT CHANGE UP (ref 98–110)
CO2 SERPL-SCNC: 24 MMOL/L — SIGNIFICANT CHANGE UP (ref 17–32)
CREAT SERPL-MCNC: 1 MG/DL — SIGNIFICANT CHANGE UP (ref 0.7–1.5)
EGFR: 80 ML/MIN/1.73M2 — SIGNIFICANT CHANGE UP
EGFR: 80 ML/MIN/1.73M2 — SIGNIFICANT CHANGE UP
EOSINOPHIL # BLD AUTO: 0.2 K/UL — SIGNIFICANT CHANGE UP (ref 0–0.7)
EOSINOPHIL NFR BLD AUTO: 1.8 % — SIGNIFICANT CHANGE UP (ref 0–8)
GLUCOSE BLDC GLUCOMTR-MCNC: 118 MG/DL — HIGH (ref 70–99)
GLUCOSE BLDC GLUCOMTR-MCNC: 123 MG/DL — HIGH (ref 70–99)
GLUCOSE BLDC GLUCOMTR-MCNC: 133 MG/DL — HIGH (ref 70–99)
GLUCOSE BLDC GLUCOMTR-MCNC: 174 MG/DL — HIGH (ref 70–99)
GLUCOSE SERPL-MCNC: 135 MG/DL — HIGH (ref 70–99)
HCT VFR BLD CALC: 43.5 % — SIGNIFICANT CHANGE UP (ref 42–52)
HGB BLD-MCNC: 15 G/DL — SIGNIFICANT CHANGE UP (ref 14–18)
IMM GRANULOCYTES NFR BLD AUTO: 0.5 % — HIGH (ref 0.1–0.3)
LYMPHOCYTES # BLD AUTO: 2.51 K/UL — SIGNIFICANT CHANGE UP (ref 1.2–3.4)
LYMPHOCYTES # BLD AUTO: 22.6 % — SIGNIFICANT CHANGE UP (ref 20.5–51.1)
MAGNESIUM SERPL-MCNC: 2 MG/DL — SIGNIFICANT CHANGE UP (ref 1.8–2.4)
MCHC RBC-ENTMCNC: 33.2 PG — HIGH (ref 27–31)
MCHC RBC-ENTMCNC: 34.5 G/DL — SIGNIFICANT CHANGE UP (ref 32–37)
MCV RBC AUTO: 96.2 FL — HIGH (ref 80–94)
MONOCYTES # BLD AUTO: 0.98 K/UL — HIGH (ref 0.1–0.6)
MONOCYTES NFR BLD AUTO: 8.8 % — SIGNIFICANT CHANGE UP (ref 1.7–9.3)
NEUTROPHILS # BLD AUTO: 7.33 K/UL — HIGH (ref 1.4–6.5)
NEUTROPHILS NFR BLD AUTO: 66 % — SIGNIFICANT CHANGE UP (ref 42.2–75.2)
NRBC BLD AUTO-RTO: 0 /100 WBCS — SIGNIFICANT CHANGE UP (ref 0–0)
PA ADP PRP-ACNC: 151 PRU — LOW (ref 182–335)
PHOSPHATE SERPL-MCNC: 3.8 MG/DL — SIGNIFICANT CHANGE UP (ref 2.1–4.9)
PLATELET # BLD AUTO: 130 K/UL — SIGNIFICANT CHANGE UP (ref 130–400)
PMV BLD: 9.4 FL — SIGNIFICANT CHANGE UP (ref 7.4–10.4)
POTASSIUM SERPL-MCNC: 4.1 MMOL/L — SIGNIFICANT CHANGE UP (ref 3.5–5)
POTASSIUM SERPL-SCNC: 4.1 MMOL/L — SIGNIFICANT CHANGE UP (ref 3.5–5)
PROT SERPL-MCNC: 5.7 G/DL — LOW (ref 6–8)
RBC # BLD: 4.52 M/UL — LOW (ref 4.7–6.1)
RBC # FLD: 12.5 % — SIGNIFICANT CHANGE UP (ref 11.5–14.5)
SODIUM SERPL-SCNC: 138 MMOL/L — SIGNIFICANT CHANGE UP (ref 135–146)
WBC # BLD: 11.1 K/UL — HIGH (ref 4.8–10.8)
WBC # FLD AUTO: 11.1 K/UL — HIGH (ref 4.8–10.8)

## 2025-04-27 PROCEDURE — 70496 CT ANGIOGRAPHY HEAD: CPT | Mod: 26

## 2025-04-27 PROCEDURE — 70498 CT ANGIOGRAPHY NECK: CPT | Mod: 26

## 2025-04-27 PROCEDURE — 0042T: CPT

## 2025-04-27 PROCEDURE — 70450 CT HEAD/BRAIN W/O DYE: CPT | Mod: 26,59

## 2025-04-27 PROCEDURE — 99232 SBSQ HOSP IP/OBS MODERATE 35: CPT | Mod: GC

## 2025-04-27 RX ORDER — NOREPINEPHRINE BITARTRATE 8 MG
0.05 SOLUTION INTRAVENOUS
Qty: 8 | Refills: 0 | Status: DISCONTINUED | OUTPATIENT
Start: 2025-04-27 | End: 2025-04-28

## 2025-04-27 RX ORDER — NOREPINEPHRINE BITARTRATE 8 MG
0.05 SOLUTION INTRAVENOUS
Qty: 8 | Refills: 0 | Status: DISCONTINUED | OUTPATIENT
Start: 2025-04-27 | End: 2025-04-27

## 2025-04-27 RX ADMIN — ATORVASTATIN CALCIUM 80 MILLIGRAM(S): 80 TABLET, FILM COATED ORAL at 21:11

## 2025-04-27 RX ADMIN — MAGNESIUM HYDROXIDE 15 MILLILITER(S): 400 SUSPENSION ORAL at 06:20

## 2025-04-27 RX ADMIN — ENTACAPONE 200 MILLIGRAM(S): 200 TABLET, FILM COATED ORAL at 18:15

## 2025-04-27 RX ADMIN — POLYETHYLENE GLYCOL 3350 17 GRAM(S): 17 POWDER, FOR SOLUTION ORAL at 06:20

## 2025-04-27 RX ADMIN — ENOXAPARIN SODIUM 40 MILLIGRAM(S): 100 INJECTION SUBCUTANEOUS at 12:58

## 2025-04-27 RX ADMIN — Medication 2 TABLET(S): at 11:54

## 2025-04-27 RX ADMIN — RIVASTIGMINE 1 PATCH: 13.3 PATCH, EXTENDED RELEASE TRANSDERMAL at 00:19

## 2025-04-27 RX ADMIN — MAGNESIUM HYDROXIDE 15 MILLILITER(S): 400 SUSPENSION ORAL at 21:11

## 2025-04-27 RX ADMIN — POLYETHYLENE GLYCOL 3350 17 GRAM(S): 17 POWDER, FOR SOLUTION ORAL at 18:17

## 2025-04-27 RX ADMIN — RIVASTIGMINE 1 PATCH: 13.3 PATCH, EXTENDED RELEASE TRANSDERMAL at 20:23

## 2025-04-27 RX ADMIN — RIVASTIGMINE 1 PATCH: 13.3 PATCH, EXTENDED RELEASE TRANSDERMAL at 12:56

## 2025-04-27 RX ADMIN — RIVASTIGMINE 1 PATCH: 13.3 PATCH, EXTENDED RELEASE TRANSDERMAL at 13:00

## 2025-04-27 RX ADMIN — LEVETIRACETAM 500 MILLIGRAM(S): 10 INJECTION, SOLUTION INTRAVENOUS at 18:17

## 2025-04-27 RX ADMIN — ENTACAPONE 200 MILLIGRAM(S): 200 TABLET, FILM COATED ORAL at 11:54

## 2025-04-27 RX ADMIN — Medication 2 TABLET(S): at 21:12

## 2025-04-27 RX ADMIN — CLOPIDOGREL BISULFATE 75 MILLIGRAM(S): 75 TABLET, FILM COATED ORAL at 11:53

## 2025-04-27 RX ADMIN — Medication 1 TABLET(S): at 21:12

## 2025-04-27 RX ADMIN — Medication 2 TABLET(S): at 06:20

## 2025-04-27 RX ADMIN — Medication 2 TABLET(S): at 18:15

## 2025-04-27 RX ADMIN — Medication 1 PACKET(S): at 06:20

## 2025-04-27 RX ADMIN — INSULIN LISPRO 2: 100 INJECTION, SOLUTION INTRAVENOUS; SUBCUTANEOUS at 21:27

## 2025-04-27 RX ADMIN — ENTACAPONE 200 MILLIGRAM(S): 200 TABLET, FILM COATED ORAL at 06:20

## 2025-04-27 RX ADMIN — ENTACAPONE 200 MILLIGRAM(S): 200 TABLET, FILM COATED ORAL at 21:11

## 2025-04-27 RX ADMIN — MAGNESIUM HYDROXIDE 15 MILLILITER(S): 400 SUSPENSION ORAL at 16:03

## 2025-04-27 RX ADMIN — Medication 81 MILLIGRAM(S): at 11:53

## 2025-04-27 RX ADMIN — Medication 1 APPLICATION(S): at 06:20

## 2025-04-27 RX ADMIN — LEVETIRACETAM 500 MILLIGRAM(S): 10 INJECTION, SOLUTION INTRAVENOUS at 06:20

## 2025-04-27 RX ADMIN — MIRTAZAPINE 7.5 MILLIGRAM(S): 30 TABLET, FILM COATED ORAL at 21:12

## 2025-04-27 NOTE — PROGRESS NOTE ADULT - ASSESSMENT
74M CVA with severe ICAD vs. vasospasm s/p IA verapamil    #CVA  ICAD    Plan:  #Neuro  -q1 NIH; protected sleep if stable  -c/w ASA 81, plavix   pru prior to plavix administration was 148 (pt likely on plavix outpatient) and does not appear resistant  -lipitor 80mg qd  -CTH - no acuity   MR brain - Scattered multiple foci of acute infarcts in multiple vascular   territories as described. No mass effect or midline shift.  Multiple tiny foci showing signal dropout/blooming artifact on   susceptibility weighted images at right subinsular region, bilateral   thalami, right basal ganglia, bilateral medial temporal lobes and left   cerebellum, probably representing microhemorrhages.  -c/w  parkinson's medication  -c/w keppra 500mg BID  eeg negative, was DC  -analgesia: tylenol prn  -stroke core measures  hemodynamic augmentation for exam optimization  -PT/OT/Speech  oobtc with assist/orthostatics    #Pulm  -SpO2 >94%  -IS    #CV  --180, trial slowly at deescalated sbp and monitor nihss to see if tolerates  -levo gtt   troponin stable, ekg stable, ROS negative for ischemia  -hold home anti-hypertensives  -telemetry  ILR interrogated no AFib  -TTE - preserved biventricular systolic function; grade 1 DD, mild AS     #GI  passed dysphagia  dash/ccd modified   -bowel regimen: senna/miralax  -LBM - small 4/27    #/Renal  normonatremic 135-145  IVL  -K >4, Mg >2  -replete lytes as needed    #Endo  -euglycemia 140-180  iss ac/hs  LDL 44  a1c 5.7  tsh 0.88    #Heme  -SCDs  chemoppx with SQL  dopplers of b/l LEs negative    #ID  -afebrile, normothermic    PIVs    dispo- NSICU

## 2025-04-27 NOTE — PROGRESS NOTE ADULT - SUBJECTIVE AND OBJECTIVE BOX
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 LKW 9:30pm yesterday, per family member, pt was speaking to her on the phone yesterday speaking in full sentences when he suddenly began to speak one word at a time. This morning, a different family member was speaking to him, states he was saying one word at a time and then suddenly stopped speaking entirely. NIHSS 10 upon arrival for severe aphasia/dysarthria as pt nonverbal, intermittently able to follow commands, baseline R facial droop, LOC questions. NI alert activated 14:02. CTH negative, CTA head and neck with focal occlusion of L MCA M2/M3, CTP with 41 mL of delayed perfusion in the left frontal lobe correlating with the CTA finding. No tnk as pt out of window. NI actual activated. During angiogram, no occlusion observed to perform thrombectomy on however diffuse ICAD vs. vasospasm was sen in the left ICA for which he received 10mg IA Verapamil with improvement in vessel narrowing  Post-op NIH 9. Exam improved to NIH 5 w/ BP augmentation at SBP 178mm Hg.Pt admitted to NSICU for BP augmentation following CVA.    Admission scores:  NIH 10  mRS 1       OVERNIGHT EVENTS: remained on IV norepi for hemodynamic augmentation, tapering down for sbp goals; nihss stable 5-6    ADMISSION SCORES:   nihss 9 preprocedure, 5 post procedure      REVIEW OF SYSTEMS: none    VITALS: [] Reviewed    IMAGING/DATA: [] Reviewed    IVF FLUIDS/MEDICATIONS: [] Reviewed    ALLERGIES: Allergies    penicillin (Unknown)  cefaclor (Unknown)  sulfa drugs (Unknown)    Intolerances        DEVICES:   [] Restraints [] PIVs [] ET tube [] central line [] PICC [] arterial line [] rois [] NGT/OGT [] EVD [] LD [] BLANCA/HMV [] LiCOX [] ICP monitor [] Trach [] PEG [] Chest Tube [] other:    EXAMINATION:  General: satup in bed comfortable  HEENT: Anicteric sclerae  Cardiac: [id number]  Lungs: Clear  Abdomen: Soft, non-tender, +BS  Extremities: No c/c/e  Skin/Incision Site: Clean, dry and intact  Neurologic: Awake, alert and following more briskly, severe expressive aphasic (modestly more verbal today, although paucity of speech noted), oriented x3 with choices , follows on all four, R facial palsy, eomi, unable to asses vf, SALMERON spont AG, no drift, no ataxia, mild tactile extinction              ICU Vital Signs Last 24 Hrs  T(C): 37.1 (27 Apr 2025 04:00), Max: 37.1 (27 Apr 2025 00:00)  T(F): 98.8 (27 Apr 2025 04:00), Max: 98.8 (27 Apr 2025 00:00)  HR: 75 (27 Apr 2025 08:15) (58 - 104)  BP: 142/81 (27 Apr 2025 08:15) (124/83 - 195/106)  BP(mean): 106 (27 Apr 2025 08:15) (95 - 140)  ABP: --  ABP(mean): --  RR: 7 (27 Apr 2025 08:15) (7 - 29)  SpO2: 90% (27 Apr 2025 08:15) (76% - 100%)      04-26-25 @ 07:01  -  04-27-25 @ 07:00  --------------------------------------------------------  IN: 959.8 mL / OUT: 1100 mL / NET: -140.2 mL            acetaminophen     Tablet .. 650 milliGRAM(s) Oral every 6 hours PRN  aspirin  chewable 81 milliGRAM(s) Oral daily  atorvastatin 80 milliGRAM(s) Oral at bedtime  carbidopa/levodopa  25/250 2 Tablet(s) Oral <User Schedule>  carbidopa/levodopa CR 50/200 1 Tablet(s) Oral <User Schedule>  chlorhexidine 2% Cloths 1 Application(s) Topical <User Schedule>  clopidogrel Tablet 75 milliGRAM(s) Oral daily  dextrose 5%. 1000 milliLiter(s) (50 mL/Hr) IV Continuous <Continuous>  dextrose 5%. 1000 milliLiter(s) (100 mL/Hr) IV Continuous <Continuous>  dextrose 50% Injectable 25 Gram(s) IV Push once  dextrose 50% Injectable 12.5 Gram(s) IV Push once  dextrose 50% Injectable 25 Gram(s) IV Push once  dextrose Oral Gel 15 Gram(s) Oral once PRN  enoxaparin Injectable 40 milliGRAM(s) SubCutaneous every 24 hours  entacapone 200 milliGRAM(s) Oral <User Schedule>  glucagon  Injectable 1 milliGRAM(s) IntraMuscular once  insulin lispro (ADMELOG) corrective regimen sliding scale   SubCutaneous Before meals and at bedtime  levETIRAcetam 500 milliGRAM(s) Oral two times a day  magnesium hydroxide Suspension 15 milliLiter(s) Oral every 8 hours  mirtazapine 7.5 milliGRAM(s) Oral at bedtime  norepinephrine Infusion 0.049 MICROgram(s)/kG/Min (6.16 mL/Hr) IV Continuous <Continuous>  polyethylene glycol 3350 17 Gram(s) Oral two times a day  rivastigmine patch  9.5 mG/24 Hr(s) 1 Patch Transdermal every 24 hours  senna 2 Tablet(s) Oral at bedtime      LABS:  Na: 138 (04-27 @ 04:29), 140 (04-26 @ 04:38), 141 (04-25 @ 05:00), 141 (04-24 @ 11:47)  K: 4.1 (04-27 @ 04:29), 4.2 (04-26 @ 04:38), 3.8 (04-25 @ 05:00), 4.6 (04-24 @ 11:47)  Cl: 102 (04-27 @ 04:29), 105 (04-26 @ 04:38), 108 (04-25 @ 05:00), 105 (04-24 @ 11:47)  CO2: 24 (04-27 @ 04:29), 24 (04-26 @ 04:38), 23 (04-25 @ 05:00), 18 (04-24 @ 11:47)  BUN: 12 (04-27 @ 04:29), 9 (04-26 @ 04:38), 9 (04-25 @ 05:00), 13 (04-24 @ 11:47)  Cr: 1.0 (04-27 @ 04:29), 0.9 (04-26 @ 04:38), 0.7 (04-25 @ 05:00), 0.8 (04-24 @ 11:47)  Glu: 135(04-27 @ 04:29), 122(04-26 @ 04:38), 133(04-25 @ 05:00), 146(04-24 @ 11:47)    Hgb: 15.0 (04-27 @ 04:29), 15.1 (04-26 @ 04:38), 14.8 (04-25 @ 05:00), 16.9 (04-24 @ 11:00)  Hct: 43.5 (04-27 @ 04:29), 42.8 (04-26 @ 04:38), 41.3 (04-25 @ 05:00), 49.4 (04-24 @ 11:00)  WBC: 11.10 (04-27 @ 04:29), 13.88 (04-26 @ 04:38), 12.76 (04-25 @ 05:00), 19.07 (04-24 @ 11:00)  Plt: 130 (04-27 @ 04:29), 154 (04-26 @ 04:38), 150 (04-25 @ 05:00), 188 (04-24 @ 11:00)    INR:   PTT:           LIVER FUNCTIONS - ( 27 Apr 2025 04:29 )  Alb: 3.5 g/dL / Pro: 5.7 g/dL / ALK PHOS: 107 U/L / ALT: 22 U/L / AST: 51 U/L / GGT: x

## 2025-04-27 NOTE — STROKE CODE NOTE - NS SC NIH SEDATED_GEN_A_CORE
From: Huber Busby  To: Vianey Carranza  Sent: 6/27/2022 12:43 PM CDT  Subject: Medication    Doctora necesito refill de Linzes 145 mg 2 veces al yomaira thank you  
No
No

## 2025-04-27 NOTE — STROKE CODE NOTE - NSMDCONSULT QTN_Y FT
Pt noted to have increased aphasia, no longer able to answer questions. Had previously been able to say full name and intermittently could give one word answers. SBP 120s-130s. Taken for repeat CTH/CTA/CTP, imaging delayed as pt required IV placement. CTH with no acute changes, CTA without LVO, stable multifocal stenoses. Pt reassessed at SBP 150s, able to say first syllable of name. No TNK due to recent infarct, improving symptoms. Will increase SBP goal >140, obtain P2Y12 level.

## 2025-04-28 LAB
ALBUMIN SERPL ELPH-MCNC: 3.4 G/DL — LOW (ref 3.5–5.2)
ALP SERPL-CCNC: 110 U/L — SIGNIFICANT CHANGE UP (ref 30–115)
ALT FLD-CCNC: 13 U/L — SIGNIFICANT CHANGE UP (ref 0–41)
ANION GAP SERPL CALC-SCNC: 8 MMOL/L — SIGNIFICANT CHANGE UP (ref 7–14)
AST SERPL-CCNC: 36 U/L — SIGNIFICANT CHANGE UP (ref 0–41)
BASOPHILS # BLD AUTO: 0.03 K/UL — SIGNIFICANT CHANGE UP (ref 0–0.2)
BASOPHILS NFR BLD AUTO: 0.2 % — SIGNIFICANT CHANGE UP (ref 0–1)
BILIRUB SERPL-MCNC: 1 MG/DL — SIGNIFICANT CHANGE UP (ref 0.2–1.2)
BUN SERPL-MCNC: 13 MG/DL — SIGNIFICANT CHANGE UP (ref 10–20)
CALCIUM SERPL-MCNC: 9 MG/DL — SIGNIFICANT CHANGE UP (ref 8.4–10.5)
CHLORIDE SERPL-SCNC: 101 MMOL/L — SIGNIFICANT CHANGE UP (ref 98–110)
CO2 SERPL-SCNC: 27 MMOL/L — SIGNIFICANT CHANGE UP (ref 17–32)
CREAT SERPL-MCNC: 0.8 MG/DL — SIGNIFICANT CHANGE UP (ref 0.7–1.5)
EGFR: 94 ML/MIN/1.73M2 — SIGNIFICANT CHANGE UP
EGFR: 94 ML/MIN/1.73M2 — SIGNIFICANT CHANGE UP
EOSINOPHIL # BLD AUTO: 0.2 K/UL — SIGNIFICANT CHANGE UP (ref 0–0.7)
EOSINOPHIL NFR BLD AUTO: 1.4 % — SIGNIFICANT CHANGE UP (ref 0–8)
GLUCOSE BLDC GLUCOMTR-MCNC: 130 MG/DL — HIGH (ref 70–99)
GLUCOSE SERPL-MCNC: 131 MG/DL — HIGH (ref 70–99)
HCT VFR BLD CALC: 42.8 % — SIGNIFICANT CHANGE UP (ref 42–52)
HGB BLD-MCNC: 14.8 G/DL — SIGNIFICANT CHANGE UP (ref 14–18)
IMM GRANULOCYTES NFR BLD AUTO: 0.4 % — HIGH (ref 0.1–0.3)
LYMPHOCYTES # BLD AUTO: 19.5 % — LOW (ref 20.5–51.1)
LYMPHOCYTES # BLD AUTO: 2.75 K/UL — SIGNIFICANT CHANGE UP (ref 1.2–3.4)
MAGNESIUM SERPL-MCNC: 2.1 MG/DL — SIGNIFICANT CHANGE UP (ref 1.8–2.4)
MCHC RBC-ENTMCNC: 33.3 PG — HIGH (ref 27–31)
MCHC RBC-ENTMCNC: 34.6 G/DL — SIGNIFICANT CHANGE UP (ref 32–37)
MCV RBC AUTO: 96.2 FL — HIGH (ref 80–94)
MONOCYTES # BLD AUTO: 1.17 K/UL — HIGH (ref 0.1–0.6)
MONOCYTES NFR BLD AUTO: 8.3 % — SIGNIFICANT CHANGE UP (ref 1.7–9.3)
NEUTROPHILS # BLD AUTO: 9.93 K/UL — HIGH (ref 1.4–6.5)
NEUTROPHILS NFR BLD AUTO: 70.2 % — SIGNIFICANT CHANGE UP (ref 42.2–75.2)
NRBC BLD AUTO-RTO: 0 /100 WBCS — SIGNIFICANT CHANGE UP (ref 0–0)
PHOSPHATE SERPL-MCNC: 3.3 MG/DL — SIGNIFICANT CHANGE UP (ref 2.1–4.9)
PLATELET # BLD AUTO: 145 K/UL — SIGNIFICANT CHANGE UP (ref 130–400)
PMV BLD: 9.6 FL — SIGNIFICANT CHANGE UP (ref 7.4–10.4)
POTASSIUM SERPL-MCNC: 4.7 MMOL/L — SIGNIFICANT CHANGE UP (ref 3.5–5)
POTASSIUM SERPL-SCNC: 4.7 MMOL/L — SIGNIFICANT CHANGE UP (ref 3.5–5)
PROT SERPL-MCNC: 5.6 G/DL — LOW (ref 6–8)
RBC # BLD: 4.45 M/UL — LOW (ref 4.7–6.1)
RBC # FLD: 12.6 % — SIGNIFICANT CHANGE UP (ref 11.5–14.5)
SODIUM SERPL-SCNC: 136 MMOL/L — SIGNIFICANT CHANGE UP (ref 135–146)
WBC # BLD: 14.13 K/UL — HIGH (ref 4.8–10.8)
WBC # FLD AUTO: 14.13 K/UL — HIGH (ref 4.8–10.8)

## 2025-04-28 PROCEDURE — 99233 SBSQ HOSP IP/OBS HIGH 50: CPT | Mod: FS

## 2025-04-28 RX ORDER — NOREPINEPHRINE BITARTRATE 8 MG
0.05 SOLUTION INTRAVENOUS
Qty: 8 | Refills: 0 | Status: DISCONTINUED | OUTPATIENT
Start: 2025-04-28 | End: 2025-04-29

## 2025-04-28 RX ORDER — SODIUM CHLORIDE 9 G/1000ML
1000 INJECTION, SOLUTION INTRAVENOUS
Refills: 0 | Status: DISCONTINUED | OUTPATIENT
Start: 2025-04-28 | End: 2025-04-30

## 2025-04-28 RX ORDER — MIDODRINE HYDROCHLORIDE 5 MG/1
7.5 TABLET ORAL EVERY 8 HOURS
Refills: 0 | Status: DISCONTINUED | OUTPATIENT
Start: 2025-04-28 | End: 2025-05-01

## 2025-04-28 RX ORDER — MIDODRINE HYDROCHLORIDE 5 MG/1
7.5 TABLET ORAL EVERY 8 HOURS
Refills: 0 | Status: DISCONTINUED | OUTPATIENT
Start: 2025-04-28 | End: 2025-04-28

## 2025-04-28 RX ADMIN — ENTACAPONE 200 MILLIGRAM(S): 200 TABLET, FILM COATED ORAL at 17:43

## 2025-04-28 RX ADMIN — Medication 2 TABLET(S): at 12:58

## 2025-04-28 RX ADMIN — RIVASTIGMINE 1 PATCH: 13.3 PATCH, EXTENDED RELEASE TRANSDERMAL at 12:55

## 2025-04-28 RX ADMIN — RIVASTIGMINE 1 PATCH: 13.3 PATCH, EXTENDED RELEASE TRANSDERMAL at 12:50

## 2025-04-28 RX ADMIN — Medication 1 TABLET(S): at 21:44

## 2025-04-28 RX ADMIN — Medication 2 TABLET(S): at 17:43

## 2025-04-28 RX ADMIN — LEVETIRACETAM 500 MILLIGRAM(S): 10 INJECTION, SOLUTION INTRAVENOUS at 06:11

## 2025-04-28 RX ADMIN — Medication 1 APPLICATION(S): at 06:12

## 2025-04-28 RX ADMIN — MIRTAZAPINE 7.5 MILLIGRAM(S): 30 TABLET, FILM COATED ORAL at 21:44

## 2025-04-28 RX ADMIN — ENOXAPARIN SODIUM 40 MILLIGRAM(S): 100 INJECTION SUBCUTANEOUS at 12:56

## 2025-04-28 RX ADMIN — Medication 2 TABLET(S): at 06:11

## 2025-04-28 RX ADMIN — Medication 81 MILLIGRAM(S): at 12:55

## 2025-04-28 RX ADMIN — MIDODRINE HYDROCHLORIDE 7.5 MILLIGRAM(S): 5 TABLET ORAL at 14:28

## 2025-04-28 RX ADMIN — CLOPIDOGREL BISULFATE 75 MILLIGRAM(S): 75 TABLET, FILM COATED ORAL at 12:56

## 2025-04-28 RX ADMIN — SODIUM CHLORIDE 40 MILLILITER(S): 9 INJECTION, SOLUTION INTRAVENOUS at 12:54

## 2025-04-28 RX ADMIN — LEVETIRACETAM 500 MILLIGRAM(S): 10 INJECTION, SOLUTION INTRAVENOUS at 17:42

## 2025-04-28 RX ADMIN — ENTACAPONE 200 MILLIGRAM(S): 200 TABLET, FILM COATED ORAL at 21:44

## 2025-04-28 RX ADMIN — ENTACAPONE 200 MILLIGRAM(S): 200 TABLET, FILM COATED ORAL at 06:11

## 2025-04-28 RX ADMIN — ENTACAPONE 200 MILLIGRAM(S): 200 TABLET, FILM COATED ORAL at 12:56

## 2025-04-28 RX ADMIN — ATORVASTATIN CALCIUM 80 MILLIGRAM(S): 80 TABLET, FILM COATED ORAL at 21:44

## 2025-04-28 RX ADMIN — MIDODRINE HYDROCHLORIDE 7.5 MILLIGRAM(S): 5 TABLET ORAL at 21:44

## 2025-04-28 NOTE — PROGRESS NOTE ADULT - ASSESSMENT
74M CVA with severe ICAD vs. vasospasm s/p IA verapamil    #CVA  ICAD    Plan:  #Neuro  -q1 NIH; protected sleep if stable  -c/w ASA 81, plavix   pru prior to plavix administration was 148 (pt likely on plavix outpatient) and does not appear resistant  -lipitor 80mg qd  -CTH - no acuity   MR brain - Scattered multiple foci of acute infarcts in multiple vascular   territories as described. No mass effect or midline shift.  Multiple tiny foci showing signal dropout/blooming artifact on   susceptibility weighted images at right subinsular region, bilateral   thalami, right basal ganglia, bilateral medial temporal lobes and left   cerebellum, probably representing microhemorrhages.  -c/w  parkinson's medication  -c/w keppra 500mg BID  eeg negative, was DC  -analgesia: tylenol prn  -stroke core measures  hemodynamic augmentation for exam optimization  -PT/OT/Speech  oobtc with assist/orthostatics    #Pulm  -SpO2 >94%  -IS    #CV  --180, trial slowly at deescalated sbp and monitor nihss to see if tolerates  -levo gtt   troponin stable, ekg stable, ROS negative for ischemia  -hold home anti-hypertensives  -telemetry  ILR interrogated no AFib  -TTE - preserved biventricular systolic function; grade 1 DD, mild AS     #GI  passed dysphagia  dash/ccd modified   -bowel regimen: senna/miralax  -LBM - small 4/27    #/Renal  normonatremic 135-145  IVL  -K >4, Mg >2  -replete lytes as needed    #Endo  -euglycemia 140-180  iss ac/hs  LDL 44  a1c 5.7  tsh 0.88    #Heme  -SCDs  chemoppx with SQL  dopplers of b/l LEs negative    #ID  -afebrile, normothermic    PIVs    dispo- NSICU  74M CVA with severe ICAD vs. vasospasm s/p IA verapamil    #CVA  ICAD  Parkinsons Dz    Plan:  #Neuro  -q1 NIH; protected sleep if stable  -c/w ASA 81, plavix- PRU- 151- 4/27/25    pru prior to plavix administration was 148 (was on plavix outpatient) and does not appear resistant  -lipitor 80mg qd  -CTH - no acuity   -c/w  parkinson's medication  -c/w keppra 500mg BID  eeg negative, was DC  -analgesia: tylenol prn  -stroke core measures- as above   hemodynamic augmentation for exam optimization- . 130 - < 160   -PT/OT/Speech  oobtc with assist/orthostatics    #Pulm  -SpO2 >94%  -IS    #CV- Loop recorder - placed 2025   --160, trial slowly at deescalated sbp and monitor nihss to see if tolerates  -levo gtt   - Midodrine 75mg q 8   troponin stable, ekg stable, ROS negative for ischemia  -hold home anti-hypertensives  -telemetry  - ILR interrogated no AFib  -TTE - preserved biventricular systolic function; grade 1 DD, mild AS     #GI  dash/ccd modified   -bowel regimen: senna/miralax  -LBM - small 4/27    #/Renal  normonatremic 135-145  LR at 40 cc/hr x24 - IVL when taking adequate po meds   -K >4, Mg >2  -replete lytes as needed    #Endo  -euglycemia 140-180  D/C SSi   LDL 44  a1c 5.7  tsh 0.88    #Heme- Thrombocytopenia- chronic   -SCDs  chemoppx with SQL  dopplers of b/l LEs negative- 4/24    #ID  -afebrile, normothermic    PIVs    dispo- NSICU

## 2025-04-28 NOTE — SWALLOW BEDSIDE ASSESSMENT ADULT - SLP GENERAL OBSERVATIONS
oob to chair. minimally verbal. ideational apraxia observed during feeding. awake and alert, following commands (simple 1 step)
Pt received in bed awake and alert on room air +aphasic, dysphonic, oral apraxia and known h/o dysarthria
awake and alert. cooperative and participatory throughout trials. minimally verbal

## 2025-04-28 NOTE — PROGRESS NOTE ADULT - SUBJECTIVE AND OBJECTIVE BOX
SUMMARY:  75 y/o M with PMHx of parkinsons, HTN, DM, gout, seizures on keppra, L bhatt radiate stroke in January 2025 on ASA 81mg and residual expressive aphasia/dysarthria/R facial droop, presenting to the ED with worsening word finding difficulty, now unable to speak at all. LKW 9:30pm yesterday, per family member, pt was speaking to her on the phone yesterday speaking in full sentences when he suddenly began to speak one word at a time. This morning, a different family member was speaking to him, states he was saying one word at a time and then suddenly stopped speaking entirely. NIHSS 10 upon arrival for severe aphasia/dysarthria as pt nonverbal, intermittently able to follow commands, baseline R facial droop, LOC questions. NI alert activated 14:02. CTH negative, CTA head and neck with focal occlusion of L MCA M2/M3, CTP with 41 mL of delayed perfusion in the left frontal lobe correlating with the CTA finding. No tnk as pt out of window. NI actual activated. During angiogram, no occlusion observed to perform thrombectomy on however diffuse ICAD vs. vasospasm was sen in the left ICA for which he received 10mg IA Verapamil with improvement in vessel narrowing  Post-op NIH 9. Exam improved to NIH 5 w/ BP augmentation at SBP 178mm Hg.Pt admitted to NSICU for BP augmentation following CVA.    Admission scores:  NIH 10  mRS 1     penicillin (Unknown)  cefaclor (Unknown)  sulfa drugs (Unknown)    Intolerances  OVERNIGHT EVENTS: remained on IV norepi for hemodynamic augmentation, tapering down for sbp goals- - < 150; nihss stable 5-6    ADMISSION SCORES:   nihss 9 preprocedure, 5 post procedure      REVIEW OF SYSTEMS: none    VITALS:   ICU Vital Signs Last 24 Hrs  T(C): 36.8 (28 Apr 2025 04:00), Max: 36.9 (28 Apr 2025 00:00)  T(F): 98.3 (28 Apr 2025 04:00), Max: 98.4 (28 Apr 2025 00:00)  HR: 51 (28 Apr 2025 06:45) (50 - 96)  BP: 163/85 (28 Apr 2025 06:45) (130/78 - 177/88)  BP(mean): 118 (28 Apr 2025 06:45) (93 - 126)  RR: 17 (28 Apr 2025 06:45) (7 - 31)  SpO2: 96% (28 Apr 2025 06:45) (81% - 100%)    O2 Parameters below as of 28 Apr 2025 06:00  Patient On (Oxygen Delivery Method): room air        27 Apr 2025 07:01  -  28 Apr 2025 07:00  --------------------------------------------------------  IN:    Norepinephrine: 143.2 mL    Oral Fluid: 200 mL  Total IN: 343.2 mL    OUT:    Voided (mL): 800 mL  Total OUT: 800 mL    Total NET: -456.8 mL      MEDICATIONS  (STANDING):  aspirin  chewable 81 milliGRAM(s) Oral daily  atorvastatin 80 milliGRAM(s) Oral at bedtime  carbidopa/levodopa  25/250 2 Tablet(s) Oral <User Schedule>  carbidopa/levodopa CR 50/200 1 Tablet(s) Oral <User Schedule>  chlorhexidine 2% Cloths 1 Application(s) Topical <User Schedule>  clopidogrel Tablet 75 milliGRAM(s) Oral daily  dextrose 5%. 1000 milliLiter(s) (50 mL/Hr) IV Continuous <Continuous>  dextrose 5%. 1000 milliLiter(s) (100 mL/Hr) IV Continuous <Continuous>  dextrose 50% Injectable 25 Gram(s) IV Push once  dextrose 50% Injectable 12.5 Gram(s) IV Push once  dextrose 50% Injectable 25 Gram(s) IV Push once  enoxaparin Injectable 40 milliGRAM(s) SubCutaneous every 24 hours  entacapone 200 milliGRAM(s) Oral <User Schedule>  glucagon  Injectable 1 milliGRAM(s) IntraMuscular once  insulin lispro (ADMELOG) corrective regimen sliding scale   SubCutaneous Before meals and at bedtime  levETIRAcetam 500 milliGRAM(s) Oral two times a day  mirtazapine 7.5 milliGRAM(s) Oral at bedtime  norepinephrine Infusion 0.049 MICROgram(s)/kG/Min (6.16 mL/Hr) IV Continuous <Continuous>  polyethylene glycol 3350 17 Gram(s) Oral two times a day  rivastigmine patch  9.5 mG/24 Hr(s) 1 Patch Transdermal every 24 hours  senna 2 Tablet(s) Oral at bedtime      04-28    136  |  101  |  13  ----------------------------<  131[H]  4.7   |  27  |  0.8    Ca    9.0      28 Apr 2025 05:02  Phos  3.3     04-28  Mg     2.1     04-28    TPro  5.6[L]  /  Alb  3.4[L]  /  TBili  1.0  /  DBili  x   /  AST  36  /  ALT  13  /  AlkPhos  110  04-28                          14.8   14.13 )-----------( 145      ( 28 Apr 2025 05:02 )             42.8      Stroke Core Measures   HGBA1C- 5.6  LDL- 44  Trig- 79  TSH- .88    Troponin =- 14      EKG-     CT Angio Brain Stroke Protocol  w/ IV Cont (04.27.25 @ 17:08) >  IMPRESSION:    CT PERFUSION:  Ischemic tissue volume estimate of 57 cc predominantly in the left   frontal lobe with additional scattered areas throughout the right   parieto-occipital areas.    CTA HEAD/NECK:    Stable exam since 4/23/2025, with critical right M1-M2 stenosis    Stable scattered stenoses as above.       MR Head No Cont (04.25.25 @ 17:25) >  IMPRESSION:    Scattered multiple foci of acute infarcts in multiple vascular   territories as described. No mass effect or midline shift.    Multiple tiny foci showing signal dropout/blooming artifact on   susceptibility weighted images at right subinsular region, bilateral   thalami, right basal ganglia, bilateral medial temporal lobes and left   cerebellum, probably representing microhemorrhages.       TTE Echo Complete w/o Contrast w/ Doppler (04.24.25 @ 13:16) >  Summary:   1. Normal global left ventricular systolic function.   2. LV Ejection Fraction by Garber's Method with a biplane EF of 69 %.   3. Spectral Doppler shows impaired relaxation pattern of left   ventricular myocardial filling (Grade I diastolic dysfunction).   4. Normal left atrial size.   5. Normal right atrial size.   6. Mild mitral annular calcification.   7. Mild thickening and calcification of the anterior and posterior   mitral valve leaflets.   8. Trace mitral valve regurgitation.   9. Mild tricuspid regurgitation.  10. Sclerotic aortic valve with decreased opening.          EXAMINATION:  General: satup in bed comfortable  HEENT: Anicteric sclerae  Cardiac: [id number]  Lungs: Clear  Abdomen: Soft, non-tender, +BS  Extremities: No c/c/e  Skin/Incision Site: Clean, dry and intact  Neurologic: Awake, alert and following more briskly, severe expressive aphasic (modestly more verbal today, although paucity of speech noted), oriented x3 with choices , follows on all four, R facial palsy, eomi, unable to asses vf, SALMERON spont AG, no drift, no ataxia, mild tactile extinction                                SUMMARY:  73 y/o M with PMHx of parkinsons, HTN, DM, gout, seizures on keppra, L bhatt radiate stroke in January 2025 on ASA 81mg and residual expressive aphasia/dysarthria/R facial droop, presenting to the ED with worsening word finding difficulty, now unable to speak at all. LKW 9:30pm yesterday, per family member, pt was speaking to her on the phone yesterday speaking in full sentences when he suddenly began to speak one word at a time. This morning, a different family member was speaking to him, states he was saying one word at a time and then suddenly stopped speaking entirely. NIHSS 10 upon arrival for severe aphasia/dysarthria as pt nonverbal, intermittently able to follow commands, baseline R facial droop, LOC questions. NI alert activated 14:02. CTH negative, CTA head and neck with focal occlusion of L MCA M2/M3, CTP with 41 mL of delayed perfusion in the left frontal lobe correlating with the CTA finding. No tnk as pt out of window. NI actual activated. During angiogram, no occlusion observed to perform thrombectomy on however diffuse ICAD vs. vasospasm was sen in the left ICA for which he received 10mg IA Verapamil with improvement in vessel narrowing  Post-op NIH 9. Exam improved to NIH 5 w/ BP augmentation at SBP 178mm Hg.Pt admitted to NSICU for BP augmentation following CVA.    Admission scores:  NIH 10  mRS 1     penicillin (Unknown)  cefaclor (Unknown)  sulfa drugs (Unknown)    Intolerances  OVERNIGHT EVENTS: remained on IV norepi for hemodynamic augmentation, tapering down for sbp goals- - < 150; nihss stable 5-6    ADMISSION SCORES:   nihss 9 preprocedure, 5 post procedure      REVIEW OF SYSTEMS: none    VITALS:   ICU Vital Signs Last 24 Hrs  T(C): 36.8 (28 Apr 2025 04:00), Max: 36.9 (28 Apr 2025 00:00)  T(F): 98.3 (28 Apr 2025 04:00), Max: 98.4 (28 Apr 2025 00:00)  HR: 51 (28 Apr 2025 06:45) (50 - 96)  BP: 163/85 (28 Apr 2025 06:45) (130/78 - 177/88)  BP(mean): 118 (28 Apr 2025 06:45) (93 - 126)  RR: 17 (28 Apr 2025 06:45) (12- 31)  SpO2: 96% (28 Apr 2025 06:45) 90% - 100%)    O2 Parameters below as of 28 Apr 2025 06:00  Patient On (Oxygen Delivery Method): room air        27 Apr 2025 07:01  -  28 Apr 2025 07:00  --------------------------------------------------------  IN:    Norepinephrine: 143.2 mL    Oral Fluid: 200 mL  Total IN: 343.2 mL    OUT:    Voided (mL): 800 mL  Total OUT: 800 mL    Total NET: -456.8 mL      MEDICATIONS  (STANDING):  aspirin  chewable 81 milliGRAM(s) Oral daily  atorvastatin 80 milliGRAM(s) Oral at bedtime  carbidopa/levodopa  25/250 2 Tablet(s) Oral <User Schedule>  carbidopa/levodopa CR 50/200 1 Tablet(s) Oral <User Schedule>  chlorhexidine 2% Cloths 1 Application(s) Topical <User Schedule>  clopidogrel Tablet 75 milliGRAM(s) Oral daily  dextrose 5%. 1000 milliLiter(s) (50 mL/Hr) IV Continuous <Continuous>  dextrose 5%. 1000 milliLiter(s) (100 mL/Hr) IV Continuous <Continuous>  dextrose 50% Injectable 25 Gram(s) IV Push once  dextrose 50% Injectable 12.5 Gram(s) IV Push once  dextrose 50% Injectable 25 Gram(s) IV Push once  enoxaparin Injectable 40 milliGRAM(s) SubCutaneous every 24 hours  entacapone 200 milliGRAM(s) Oral <User Schedule>  glucagon  Injectable 1 milliGRAM(s) IntraMuscular once  insulin lispro (ADMELOG) corrective regimen sliding scale   SubCutaneous Before meals and at bedtime  levETIRAcetam 500 milliGRAM(s) Oral two times a day  mirtazapine 7.5 milliGRAM(s) Oral at bedtime  norepinephrine Infusion 0.049 MICROgram(s)/kG/Min (6.16 mL/Hr) IV Continuous <Continuous>  polyethylene glycol 3350 17 Gram(s) Oral two times a day  rivastigmine patch  9.5 mG/24 Hr(s) 1 Patch Transdermal every 24 hours  senna 2 Tablet(s) Oral at bedtime      04-28    136  |  101  |  13  ----------------------------<  131[H]  4.7   |  27  |  0.8    Ca    9.0      28 Apr 2025 05:02  Phos  3.3     04-28  Mg     2.1     04-28    TPro  5.6[L]  /  Alb  3.4[L]  /  TBili  1.0  /  DBili  x   /  AST  36  /  ALT  13  /  AlkPhos  110  04-28                          14.8   14.13 )-----------( 145  ( 130)     ( 28 Apr 2025 05:02 )             42.8      Stroke Core Measures   HGBA1C- 5.6  LDL- 44  Trig- 79  TSH- .88    Troponin =- 14      EKG-     CT Angio Brain Stroke Protocol  w/ IV Cont (04.27.25 @ 17:08) >  IMPRESSION:    CT PERFUSION:  Ischemic tissue volume estimate of 57 cc predominantly in the left   frontal lobe with additional scattered areas throughout the right   parieto-occipital areas.    CTA HEAD/NECK:    Stable exam since 4/23/2025, with critical right M1-M2 stenosis    Stable scattered stenoses as above.       MR Head No Cont (04.25.25 @ 17:25) >  IMPRESSION:    Scattered multiple foci of acute infarcts in multiple vascular   territories as described. No mass effect or midline shift.    Multiple tiny foci showing signal dropout/blooming artifact on   susceptibility weighted images at right subinsular region, bilateral   thalami, right basal ganglia, bilateral medial temporal lobes and left   cerebellum, probably representing microhemorrhages.       TTE Echo Complete w/o Contrast w/ Doppler (04.24.25 @ 13:16) >  Summary:   1. Normal global left ventricular systolic function.   2. LV Ejection Fraction by Garber's Method with a biplane EF of 69 %.   3. Spectral Doppler shows impaired relaxation pattern of left   ventricular myocardial filling (Grade I diastolic dysfunction).   4. Normal left atrial size.   5. Normal right atrial size.   6. Mild mitral annular calcification.   7. Mild thickening and calcification of the anterior and posterior   mitral valve leaflets.   8. Trace mitral valve regurgitation.   9. Mild tricuspid regurgitation.  10. Sclerotic aortic valve with decreased opening.          EXAMINATION:  General: satup in bed comfortable  HEENT: Anicteric sclerae  Cardiac: [id number]  Lungs: Clear  Abdomen: Soft, non-tender, +BS  Extremities: No c/c/e  Skin/Incision Site: Clean, dry and intact  Neurologic: Awake, alert and following more briskly, severe expressive aphasic (modestly more verbal today, although paucity of speech noted), oriented x3 with choices , follows on all four, R facial palsy, eomi, unable to asses vf, SALMERON spont AG, no drift, no ataxia, mild tactile extinction

## 2025-04-28 NOTE — SWALLOW BEDSIDE ASSESSMENT ADULT - SWALLOW EVAL: RECOMMENDED DIET
minced and moist diet w/ thin liquids
soft and bite sized foods with thin liquids 1:1 due to ideational apraxia
soft and bite sized foods with thin liquids

## 2025-04-28 NOTE — SWALLOW BEDSIDE ASSESSMENT ADULT - NS SPL SWALLOW CLINIC TRIAL FT
mild oral impairment no overt symptoms of laryngeal penetration/aspiration
Pt presents w/ mild oral dysphagia, c/o difficulty/fatigue w/ mastication. +toleration for minced and moist w/ thin liquids w/o any immediate overt s/s of penetration/aspiration
mild oral impairment no overt symptoms of laryngeal penetration/aspiration (ideational apraxia during feeding)

## 2025-04-28 NOTE — SWALLOW BEDSIDE ASSESSMENT ADULT - SLP PERTINENT HISTORY OF CURRENT PROBLEM
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 LKW 9:30pm yesterday, per family member, pt was speaking to her on the phone yesterday speaking in full sentences when he suddenly began to speak one word at a time. This morning, a different family member was speaking to him, states he was saying one word at a time and then suddenly stopped speaking entirely.
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 LKW 9:30pm yesterday, per family member, pt was speaking to her on the phone yesterday speaking in full sentences when he suddenly began to speak one word at a time. This morning, a different family member was speaking to him, states he was saying one word at a time and then suddenly stopped speaking entirely.
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 LKW 9:30pm yesterday, per family member, pt was speaking to her on the phone yesterday speaking in full sentences when he suddenly began to speak one word at a time. This morning, a different family member was speaking to him, states he was saying one word at a time and then suddenly stopped speaking entirely.

## 2025-04-28 NOTE — SWALLOW BEDSIDE ASSESSMENT ADULT - ASR SWALLOW ASPIRATION MONITOR
change of breathing pattern/oral hygiene/position upright (90Y)/cough/gurgly voice/fever/pneumonia/throat clearing/upper respiratory infection
change of breathing pattern/position upright (90Y)

## 2025-04-28 NOTE — SWALLOW BEDSIDE ASSESSMENT ADULT - ORAL PREPARATORY PHASE
Reduced oral grading/Decreased mastication ability
Within functional limits
Decreased mastication ability

## 2025-04-28 NOTE — SWALLOW BEDSIDE ASSESSMENT ADULT - CONSISTENCIES ADMINISTERED
thin liquid/pureed/soft & bite-sized
thin liquid/pureed/minced & moist/soft & bite-sized
thin liquid/pureed/soft & bite-sized

## 2025-04-28 NOTE — SWALLOW BEDSIDE ASSESSMENT ADULT - ASR SWALLOW RECOMMEND DIAG
pt would benefit from an instrumental swallow study inpt vs oupt 2' h/o PD, CVA and now w/ acute CVA

## 2025-04-28 NOTE — SWALLOW BEDSIDE ASSESSMENT ADULT - SWALLOW EVAL: DIAGNOSIS
Pt presents w/ mild oral dysphagia, c/o difficulty/fatigue w/ mastication. +toleration for minced and moist w/ thin liquids w/o any immediate overt s/s of penetration/aspiration
mild oral impairment. no overt symptoms of laryngeal penetration/aspiration
mild oral impairment no overt symptoms of laryngeal penetration/aspiration (ideational apraxia during feeding)

## 2025-04-28 NOTE — PHARMACOTHERAPY INTERVENTION NOTE - COMMENTS
ZAKIA MILLS 74y Male    Assessed patient medications for high-risk medications:    Benzodiazepines  Opioids  High-anticholinergic medications  Sedatives/ sleep medications  Muscle relaxants  Tricyclic antidepressants  Antipsychotics    acetaminophen     Tablet .. 650 milliGRAM(s) Oral every 6 hours PRN  aspirin  chewable 81 milliGRAM(s) Oral daily  atorvastatin 80 milliGRAM(s) Oral at bedtime  carbidopa/levodopa  25/250 2 Tablet(s) Oral <User Schedule>  carbidopa/levodopa CR 50/200 1 Tablet(s) Oral <User Schedule>  chlorhexidine 2% Cloths 1 Application(s) Topical <User Schedule>  clopidogrel Tablet 75 milliGRAM(s) Oral daily  dextrose 5%. 1000 milliLiter(s) IV Continuous <Continuous>  dextrose 5%. 1000 milliLiter(s) IV Continuous <Continuous>  dextrose 50% Injectable 25 Gram(s) IV Push once  dextrose 50% Injectable 12.5 Gram(s) IV Push once  dextrose 50% Injectable 25 Gram(s) IV Push once  dextrose Oral Gel 15 Gram(s) Oral once PRN  enoxaparin Injectable 40 milliGRAM(s) SubCutaneous every 24 hours  entacapone 200 milliGRAM(s) Oral <User Schedule>  glucagon  Injectable 1 milliGRAM(s) IntraMuscular once  insulin lispro (ADMELOG) corrective regimen sliding scale   SubCutaneous Before meals and at bedtime  levETIRAcetam 500 milliGRAM(s) Oral two times a day  mirtazapine 7.5 milliGRAM(s) Oral at bedtime  norepinephrine Infusion 0.049 MICROgram(s)/kG/Min IV Continuous <Continuous>  polyethylene glycol 3350 17 Gram(s) Oral daily  rivastigmine patch  9.5 mG/24 Hr(s) 1 Patch Transdermal every 24 hours  senna 2 Tablet(s) Oral at bedtime  sodium chloride 0.9%. 1000 milliLiter(s) IV Continuous <Continuous>      [     ] Team informed of high risk medications   [     ] None of the above medications identified.   [  x    ] Discussed with prescriber and no interventions at this time  - mirtazapine is a home medication     Discussed with prescriber, if appropriate, to consider:   [      ]HOLDING   [      ]REDUCING  [      ]CONTINUE  
ZAKIA MILLS 74y Male    Assessed patient medications for high-risk medications:    Benzodiazepines  Opioids  High-anticholinergic medications  Sedatives/ sleep medications  Muscle relaxants  Tricyclic antidepressants  Antipsychotics    acetaminophen     Tablet .. 650 milliGRAM(s) Oral every 6 hours PRN  aspirin  chewable 81 milliGRAM(s) Oral daily  atorvastatin 80 milliGRAM(s) Oral at bedtime  carbidopa/levodopa  25/250 2 Tablet(s) Oral <User Schedule>  carbidopa/levodopa CR 50/200 1 Tablet(s) Oral <User Schedule>  chlorhexidine 2% Cloths 1 Application(s) Topical <User Schedule>  clopidogrel Tablet 75 milliGRAM(s) Oral daily  enoxaparin Injectable 40 milliGRAM(s) SubCutaneous every 24 hours  entacapone 200 milliGRAM(s) Oral <User Schedule>  lactated ringers. 1000 milliLiter(s) IV Continuous <Continuous>  levETIRAcetam 500 milliGRAM(s) Oral two times a day  midodrine 7.5 milliGRAM(s) Oral every 8 hours  mirtazapine 7.5 milliGRAM(s) Oral at bedtime  norepinephrine Infusion 0.049 MICROgram(s)/kG/Min IV Continuous <Continuous>  polyethylene glycol 3350 17 Gram(s) Oral two times a day  rivastigmine patch  9.5 mG/24 Hr(s) 1 Patch Transdermal every 24 hours  senna 2 Tablet(s) Oral at bedtime      [     ] Team informed of high risk medications   [     ] None of the above medications identified.   [      ] Discussed with prescriber and no interventions at this time  - rivastigmine patch and mirtazapine are home medications - no side effects identified, will continue to monitor    Discussed with prescriber, if appropriate, to consider:   [      ]HOLDING   [      ]REDUCING  [      ]CONTINUE

## 2025-04-29 LAB
ALBUMIN SERPL ELPH-MCNC: 3.4 G/DL — LOW (ref 3.5–5.2)
ALP SERPL-CCNC: 107 U/L — SIGNIFICANT CHANGE UP (ref 30–115)
ALT FLD-CCNC: 18 U/L — SIGNIFICANT CHANGE UP (ref 0–41)
ANION GAP SERPL CALC-SCNC: 9 MMOL/L — SIGNIFICANT CHANGE UP (ref 7–14)
AST SERPL-CCNC: 33 U/L — SIGNIFICANT CHANGE UP (ref 0–41)
BASOPHILS # BLD AUTO: 0.03 K/UL — SIGNIFICANT CHANGE UP (ref 0–0.2)
BASOPHILS NFR BLD AUTO: 0.3 % — SIGNIFICANT CHANGE UP (ref 0–1)
BILIRUB SERPL-MCNC: 0.8 MG/DL — SIGNIFICANT CHANGE UP (ref 0.2–1.2)
BUN SERPL-MCNC: 15 MG/DL — SIGNIFICANT CHANGE UP (ref 10–20)
CALCIUM SERPL-MCNC: 9.2 MG/DL — SIGNIFICANT CHANGE UP (ref 8.4–10.5)
CHLORIDE SERPL-SCNC: 104 MMOL/L — SIGNIFICANT CHANGE UP (ref 98–110)
CO2 SERPL-SCNC: 24 MMOL/L — SIGNIFICANT CHANGE UP (ref 17–32)
CREAT SERPL-MCNC: 0.8 MG/DL — SIGNIFICANT CHANGE UP (ref 0.7–1.5)
EGFR: 94 ML/MIN/1.73M2 — SIGNIFICANT CHANGE UP
EGFR: 94 ML/MIN/1.73M2 — SIGNIFICANT CHANGE UP
EOSINOPHIL # BLD AUTO: 0.16 K/UL — SIGNIFICANT CHANGE UP (ref 0–0.7)
EOSINOPHIL NFR BLD AUTO: 1.7 % — SIGNIFICANT CHANGE UP (ref 0–8)
GLUCOSE SERPL-MCNC: 100 MG/DL — HIGH (ref 70–99)
HCT VFR BLD CALC: 42.2 % — SIGNIFICANT CHANGE UP (ref 42–52)
HGB BLD-MCNC: 14.6 G/DL — SIGNIFICANT CHANGE UP (ref 14–18)
IMM GRANULOCYTES NFR BLD AUTO: 0.3 % — SIGNIFICANT CHANGE UP (ref 0.1–0.3)
LYMPHOCYTES # BLD AUTO: 2.15 K/UL — SIGNIFICANT CHANGE UP (ref 1.2–3.4)
LYMPHOCYTES # BLD AUTO: 22.5 % — SIGNIFICANT CHANGE UP (ref 20.5–51.1)
MAGNESIUM SERPL-MCNC: 2.1 MG/DL — SIGNIFICANT CHANGE UP (ref 1.8–2.4)
MCHC RBC-ENTMCNC: 33.4 PG — HIGH (ref 27–31)
MCHC RBC-ENTMCNC: 34.6 G/DL — SIGNIFICANT CHANGE UP (ref 32–37)
MCV RBC AUTO: 96.6 FL — HIGH (ref 80–94)
MONOCYTES # BLD AUTO: 0.94 K/UL — HIGH (ref 0.1–0.6)
MONOCYTES NFR BLD AUTO: 9.8 % — HIGH (ref 1.7–9.3)
NEUTROPHILS # BLD AUTO: 6.26 K/UL — SIGNIFICANT CHANGE UP (ref 1.4–6.5)
NEUTROPHILS NFR BLD AUTO: 65.4 % — SIGNIFICANT CHANGE UP (ref 42.2–75.2)
NRBC BLD AUTO-RTO: 0 /100 WBCS — SIGNIFICANT CHANGE UP (ref 0–0)
PHOSPHATE SERPL-MCNC: 4.1 MG/DL — SIGNIFICANT CHANGE UP (ref 2.1–4.9)
PLATELET # BLD AUTO: 143 K/UL — SIGNIFICANT CHANGE UP (ref 130–400)
PMV BLD: 9.7 FL — SIGNIFICANT CHANGE UP (ref 7.4–10.4)
POTASSIUM SERPL-MCNC: 4.8 MMOL/L — SIGNIFICANT CHANGE UP (ref 3.5–5)
POTASSIUM SERPL-SCNC: 4.8 MMOL/L — SIGNIFICANT CHANGE UP (ref 3.5–5)
PROT SERPL-MCNC: 5.6 G/DL — LOW (ref 6–8)
RBC # BLD: 4.37 M/UL — LOW (ref 4.7–6.1)
RBC # FLD: 12.5 % — SIGNIFICANT CHANGE UP (ref 11.5–14.5)
SODIUM SERPL-SCNC: 137 MMOL/L — SIGNIFICANT CHANGE UP (ref 135–146)
WBC # BLD: 9.57 K/UL — SIGNIFICANT CHANGE UP (ref 4.8–10.8)
WBC # FLD AUTO: 9.57 K/UL — SIGNIFICANT CHANGE UP (ref 4.8–10.8)

## 2025-04-29 PROCEDURE — 99232 SBSQ HOSP IP/OBS MODERATE 35: CPT

## 2025-04-29 RX ORDER — ASPIRIN 325 MG
1 TABLET ORAL
Refills: 0 | DISCHARGE

## 2025-04-29 RX ADMIN — MIDODRINE HYDROCHLORIDE 7.5 MILLIGRAM(S): 5 TABLET ORAL at 14:14

## 2025-04-29 RX ADMIN — RIVASTIGMINE 1 PATCH: 13.3 PATCH, EXTENDED RELEASE TRANSDERMAL at 14:13

## 2025-04-29 RX ADMIN — RIVASTIGMINE 1 PATCH: 13.3 PATCH, EXTENDED RELEASE TRANSDERMAL at 12:01

## 2025-04-29 RX ADMIN — ATORVASTATIN CALCIUM 80 MILLIGRAM(S): 80 TABLET, FILM COATED ORAL at 21:11

## 2025-04-29 RX ADMIN — Medication 2 TABLET(S): at 21:12

## 2025-04-29 RX ADMIN — POLYETHYLENE GLYCOL 3350 17 GRAM(S): 17 POWDER, FOR SOLUTION ORAL at 18:28

## 2025-04-29 RX ADMIN — ENTACAPONE 200 MILLIGRAM(S): 200 TABLET, FILM COATED ORAL at 11:53

## 2025-04-29 RX ADMIN — Medication 1 APPLICATION(S): at 06:28

## 2025-04-29 RX ADMIN — MIDODRINE HYDROCHLORIDE 7.5 MILLIGRAM(S): 5 TABLET ORAL at 21:12

## 2025-04-29 RX ADMIN — MIDODRINE HYDROCHLORIDE 7.5 MILLIGRAM(S): 5 TABLET ORAL at 06:27

## 2025-04-29 RX ADMIN — Medication 1 TABLET(S): at 21:24

## 2025-04-29 RX ADMIN — Medication 2 TABLET(S): at 18:28

## 2025-04-29 RX ADMIN — ENTACAPONE 200 MILLIGRAM(S): 200 TABLET, FILM COATED ORAL at 21:11

## 2025-04-29 RX ADMIN — CLOPIDOGREL BISULFATE 75 MILLIGRAM(S): 75 TABLET, FILM COATED ORAL at 11:53

## 2025-04-29 RX ADMIN — Medication 81 MILLIGRAM(S): at 11:53

## 2025-04-29 RX ADMIN — LEVETIRACETAM 500 MILLIGRAM(S): 10 INJECTION, SOLUTION INTRAVENOUS at 18:28

## 2025-04-29 RX ADMIN — ENOXAPARIN SODIUM 40 MILLIGRAM(S): 100 INJECTION SUBCUTANEOUS at 14:13

## 2025-04-29 RX ADMIN — Medication 2 TABLET(S): at 06:28

## 2025-04-29 RX ADMIN — Medication 2 TABLET(S): at 11:53

## 2025-04-29 RX ADMIN — LEVETIRACETAM 500 MILLIGRAM(S): 10 INJECTION, SOLUTION INTRAVENOUS at 06:27

## 2025-04-29 RX ADMIN — ENTACAPONE 200 MILLIGRAM(S): 200 TABLET, FILM COATED ORAL at 18:28

## 2025-04-29 RX ADMIN — RIVASTIGMINE 1 PATCH: 13.3 PATCH, EXTENDED RELEASE TRANSDERMAL at 19:30

## 2025-04-29 RX ADMIN — MIRTAZAPINE 7.5 MILLIGRAM(S): 30 TABLET, FILM COATED ORAL at 21:32

## 2025-04-29 RX ADMIN — ENTACAPONE 200 MILLIGRAM(S): 200 TABLET, FILM COATED ORAL at 06:27

## 2025-04-29 NOTE — PROGRESS NOTE ADULT - ASSESSMENT
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    Plan: continue bedside therapy as tolerated            pt is screened for possible 4a rehab            will follow

## 2025-04-29 NOTE — CHART NOTE - NSCHARTNOTEFT_GEN_A_CORE
Registered Dietitian Follow-Up     Patient Profile Reviewed                           Yes [x]   No []     Nutrition History Previously Obtained        Yes [x]  No []       Pertinent Subjective Information: Per RN, pt has a good appetite; consuming 100% of meals and 100% of supplements. Tolerating diet texture/consistency well. No nausea or vomiting reported.      Pertinent Medical Information: Pt is a 75 y/o male with PMHx of parkinsons, HTN, DM, gout, seizures on keppra, L bhatt radiate stroke in 2025 on ASA 81mg and residual expressive aphasia/dysarthria/R facial droop, presenting to the ED with worsening word finding difficulty, now unable to speak at all. CVA with severe ICAD vs. vasospasm s/p IA verapamil.     Per SLP note on 2025, recommend minced and moist diet w/ thin liquids.      Diet order: Diet, Minced and Moist:   Consistent Carbohydrate {Evening Snack} (CSTCHOSN)  DASH/TLC {Sodium & Cholesterol Restricted} (DASH)  Supplement Feeding Modality:  Oral  Glucerna Shake Cans or Servings Per Day:  1       Frequency:  Two Times a day (25 @ 14:08) [Active]    Anthropometrics:  Height (cm): 167.6 (25 @ 12:21)  Weight (kg): 66.8 (25 @ 18:45)  BMI (kg/m2): 23.8 (25 @ 12:21)  IBW: 64.5 KG    Daily Weight in k (-29), Weight in k (-28), Weight in k.9 (-27), Weight in k.8 (-26), Weight in k.3 (-25), Weight in k.9 (-24) -- weight fluctuations likely d/t fluid shifts; edema noted    MEDICATIONS  (STANDING):  aspirin  chewable 81 milliGRAM(s) Oral daily  atorvastatin 80 milliGRAM(s) Oral at bedtime  carbidopa/levodopa  25/250 2 Tablet(s) Oral <User Schedule>  carbidopa/levodopa CR 50/200 1 Tablet(s) Oral <User Schedule>  chlorhexidine 2% Cloths 1 Application(s) Topical <User Schedule>  clopidogrel Tablet 75 milliGRAM(s) Oral daily  enoxaparin Injectable 40 milliGRAM(s) SubCutaneous every 24 hours  entacapone 200 milliGRAM(s) Oral <User Schedule>  lactated ringers. 1000 milliLiter(s) (40 mL/Hr) IV Continuous <Continuous>  levETIRAcetam 500 milliGRAM(s) Oral two times a day  midodrine 7.5 milliGRAM(s) Oral every 8 hours  mirtazapine 7.5 milliGRAM(s) Oral at bedtime  polyethylene glycol 3350 17 Gram(s) Oral two times a day  rivastigmine patch  9.5 mG/24 Hr(s) 1 Patch Transdermal every 24 hours  senna 2 Tablet(s) Oral at bedtime    Pertinent Labs:  @ 04:45: Na 137, BUN 15, Cr 0.8, [H], K+ 4.8, Phos 4.1, Mg 2.1, Alk Phos 107, ALT/SGPT 18, AST/SGOT 33, HbA1c --    Physical Findings:  - Appearance: alert; unable to speak per flow sheet   - GI function: last BM on   - Tubes: n/a  - Oral/Mouth cavity: minced and moist w/ thin liquids   - Skin: intact; no pressure injuries noted   - Edema: edema 2+ to right arm      Nutrition Requirements:  Weight Used: 66.8 KG Using ABW -- with consideration for age, BMI, spcm, no longer in critical care    Estimated Energy Needs    Continue [x]  Adjust []  ENERGY: 3680-3086 kcal/day (25-30 kcal/kg)     Estimated Protein Needs    Continue [x]  Adjust []  PROTEIN:  g/day (1.2-1.5 g/kg)     Estimated Fluid Needs        Continue [x]  Adjust []  FLUID: 1mL/kcal    [x] Previous Nutrition Diagnosis: Severe protein-calorie malnutrition              [x] Ongoing          [] Resolved     Nutrition Education: Encouraged PO intake      Goal/Expected Outcome: Continue to meet >75% of estimated needs within 5-7 days      Indicator/Monitoring: Diet order, PO intake, weights, labs, NFPF, body composition, BM and tolerance to medical food supplements    Recommendation:  1. Continue with current diet order + supplements  2. Encourage PO intake and assist during meals    Moderate risk f/u    RD to remain available: Eden Cartagena [phone] or TEAMS

## 2025-04-29 NOTE — PROGRESS NOTE ADULT - ASSESSMENT
74M CVA with severe ICAD vs. vasospasm s/p IA verapamil    #CVA  ICAD  Parkinsons Dz    Plan:  #Neuro- L distal MCA stenosis/ ASCVD  -q1 NIH; protected sleep if stable  -c/w ASA 81, plavix- PRU- 151- 4/27/25   -lipitor 80mg qd  -CTH - no acuity   -c/w  parkinson's medication  -c/w keppra 500mg BID  -analgesia: tylenol prn  -stroke core measures- as above   hemodynamic augmentation for exam optimization- . 130 - < 160 - wean Norepinephrine - midodrine 7.5 mg q8   -PT/OT/Speech  oobtc with assist/orthostatics    #Pulm  -SpO2 >94%  -IS    #CV- Loop recorder - placed 2025   --160, trial slowly at deescalated sbp and monitor nihss to see if tolerates  troponin stable, ekg stable, ROS negative for ischemia  -hold home anti-hypertensives  -telemetry  - ILR interrogated no AFib  -TTE - preserved biventricular systolic function; grade 1 DD, mild AS     #GI  dash/ccd modified   -bowel regimen: senna/miralax  -LBM - small 4/27    #/Renal  normonatremic 135-145  LR at 40 cc/hr x24 - IVL when taking adequate po meds   -K >4, Mg >2  -replete lytes as needed    #Endo  -euglycemia 140-180  D/C SSi   LDL 44  a1c 5.7  tsh 0.88    #Heme- Thrombocytopenia- chronic   -SCDs  chemoppx with SQL  dopplers of b/l LEs negative- 4/24    #ID  -afebrile, normothermic    PIVs    dispo- NSICU  74M CVA with severe ICAD vs. vasospasm s/p IA verapamil    #CVA  ICAD  Parkinsons Dz    Plan:  #Neuro- L distal MCA stenosis/ ASCVD  -q1 NIH; protected sleep if stable  -c/w ASA 81, plavix- PRU- 151- 4/27/25   -lipitor 80mg qd  -CTH - no acuity   -c/w  parkinson's medication  -c/w keppra 500mg BID  -analgesia: tylenol prn  -stroke core measures- as above   hemodynamic augmentation for exam optimization- . 130 - < 160 - wean Norepinephrine - midodrine 7.5 mg q8   -PT/OT/Speech  oobtc with assist/orthostatics    #Pulm  -SpO2 >94%  -IS    #CV- Loop recorder - placed 2025   --160, - NO change clinically tolerates  troponin stable, ekg stable, ROS negative for ischemia  -hold home anti-hypertensives  -telemetry  - ILR interrogated no AFib  -TTE - preserved biventricular systolic function; grade 1 DD, mild AS     #GI  dash/ccd modified   -bowel regimen: senna/miralax  -LBM - small 4/27    #/Renal  normonatremic 135-145  LR at 40 cc/hr x24 - IVL when taking adequate po meds   -K >4, Mg >2  -replete lytes as needed    #Endo  -euglycemia 140-180  D/C SSi   LDL 44  a1c 5.7  tsh 0.88    #Heme- Thrombocytopenia- chronic   -SCDs  chemoppx with SQL  dopplers of b/l LEs negative- 4/24    #ID  -afebrile, normothermic      Misc - Chemical Phlebitis in R antecubital - arm dependent  position with warm packs     PIVs    dispo- NSICU

## 2025-04-29 NOTE — PROGRESS NOTE ADULT - SUBJECTIVE AND OBJECTIVE BOX
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 LKW 9:30pm yesterday, per family member, pt was speaking to her on the phone yesterday speaking in full sentences when he suddenly began to speak one word at a time. This morning, a different family member was speaking to him, states he was saying one word at a time and then suddenly stopped speaking entirely. NIHSS 10 upon arrival for severe aphasia/dysarthria as pt nonverbal, intermittently able to follow commands, baseline R facial droop, LOC questions. NI alert activated 14:02. CTH negative, CTA head and neck with focal occlusion of L MCA M2/M3, CTP with 41 mL of delayed perfusion in the left frontal lobe correlating with the CTA finding. No tnk as pt out of window. NI actual activated. During angiogram, no occlusion observed to perform thrombectomy on however diffuse ICAD vs. vasospasm was sen in the left ICA for which he received 10mg IA Verapamil with improvement in vessel narrowing  Post-op NIH 9. Exam improved to NIH 5 w/ BP augmentation at SBP 178mm Hg.Pt admitted to NSICU for BP augmentation following CVA.    < from: MR Head No Cont (04.25.25 @ 17:25) >  FINDINGS:    Scattered multiple foci of diffusion restriction in multiple vascular   territories at left frontal parasagittal region,left corona   radiata/centrum semiovale, bilateral temporal, left occipital and right   cerebellar lobes.    Multiple foci showing signal dropout and blooming artifact on   susceptibility weighted images at right subinsular region, bilateral   thalami, right basal ganglia, bilateral medial temporal lobes and left   cerebellum, probably representing microhemorrhages.      < end of copied text >            PHYSICAL EXAM    Vital Signs Last 24 Hrs  T(C): 36.7 (29 Apr 2025 12:00), Max: 37 (28 Apr 2025 20:00)  T(F): 98 (29 Apr 2025 12:00), Max: 98.6 (28 Apr 2025 20:00)  HR: 82 (29 Apr 2025 14:00) (58 - 120)  BP: 153/95 (29 Apr 2025 14:00) (118/66 - 169/80)  BP(mean): 127 (29 Apr 2025 14:00) (79 - 127)  RR: 16 (29 Apr 2025 14:00) (10 - 26)  SpO2: 100% (29 Apr 2025 14:00) (98% - 100%)    Parameters below as of 29 Apr 2025 13:36  Patient On (Oxygen Delivery Method): room air        Constitutional - NAD, aphasia / non verbal   Chest - CTA  Cardiovascular - S1S2+  Abdomen -  Soft  Extremities -  No calf tenderness, vadim hand mitten   Neuro: right facial droop, able to move left side > right side   Function : bed mobility and transfer mod A             ambulate 2 steps mod A / upper body dressing mod A     acetaminophen     Tablet .. 650 milliGRAM(s) Oral every 6 hours PRN  aspirin  chewable 81 milliGRAM(s) Oral daily  atorvastatin 80 milliGRAM(s) Oral at bedtime  carbidopa/levodopa  25/250 2 Tablet(s) Oral <User Schedule>  carbidopa/levodopa CR 50/200 1 Tablet(s) Oral <User Schedule>  chlorhexidine 2% Cloths 1 Application(s) Topical <User Schedule>  clopidogrel Tablet 75 milliGRAM(s) Oral daily  enoxaparin Injectable 40 milliGRAM(s) SubCutaneous every 24 hours  entacapone 200 milliGRAM(s) Oral <User Schedule>  lactated ringers. 1000 milliLiter(s) IV Continuous <Continuous>  levETIRAcetam 500 milliGRAM(s) Oral two times a day  midodrine 7.5 milliGRAM(s) Oral every 8 hours  mirtazapine 7.5 milliGRAM(s) Oral at bedtime  polyethylene glycol 3350 17 Gram(s) Oral two times a day  rivastigmine patch  9.5 mG/24 Hr(s) 1 Patch Transdermal every 24 hours  senna 2 Tablet(s) Oral at bedtime      RECENT LABS/IMAGING                        14.6   9.57  )-----------( 143      ( 29 Apr 2025 04:45 )             42.2     04-29    137  |  104  |  15  ----------------------------<  100[H]  4.8   |  24  |  0.8    Ca    9.2      29 Apr 2025 04:45  Phos  4.1     04-29  Mg     2.1     04-29    TPro  5.6[L]  /  Alb  3.4[L]  /  TBili  0.8  /  DBili  x   /  AST  33  /  ALT  18  /  AlkPhos  107  04-29      Urinalysis Basic - ( 29 Apr 2025 04:45 )    Color: x / Appearance: x / SG: x / pH: x  Gluc: 100 mg/dL / Ketone: x  / Bili: x / Urobili: x   Blood: x / Protein: x / Nitrite: x   Leuk Esterase: x / RBC: x / WBC x   Sq Epi: x / Non Sq Epi: x / Bacteria: x

## 2025-04-29 NOTE — PROGRESS NOTE ADULT - SUBJECTIVE AND OBJECTIVE BOX
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 LKW 9:30pm yesterday, per family member, pt was speaking to her on the phone yesterday speaking in full sentences when he suddenly began to speak one word at a time. This morning, a different family member was speaking to him, states he was saying one word at a time and then suddenly stopped speaking entirely. NIHSS 10 upon arrival for severe aphasia/dysarthria as pt nonverbal, intermittently able to follow commands, baseline R facial droop, LOC questions. NI alert activated 14:02. CTH negative, CTA head and neck with focal occlusion of L MCA M2/M3, CTP with 41 mL of delayed perfusion in the left frontal lobe correlating with the CTA finding. No tnk as pt out of window. NI actual activated. During angiogram, no occlusion observed to perform thrombectomy on however diffuse ICAD vs. vasospasm was sen in the left ICA for which he received 10mg IA Verapamil with improvement in vessel narrowing  Post-op NIH 9. Exam improved to NIH 5 w/ BP augmentation at SBP 178mm Hg.Pt admitted to NSICU for BP augmentation following CVA.    Admission scores:  NIH 10  mRS 1     penicillin (Unknown)  cefaclor (Unknown)  sulfa drugs (Unknown)    Intolerances  OVERNIGHT EVENTS: remained on IV norepi for hemodynamic augmentation, tapering down for sbp goals- - < 150; nihss stable 5-6    ADMISSION SCORES:   nihss 9 preprocedure, 5 post procedure      REVIEW OF SYSTEMS: none    ICU Vital Signs Last 24 Hrs  T(C): 36.8 (29 Apr 2025 04:00), Max: 37.2 (28 Apr 2025 08:00)  T(F): 98.2 (29 Apr 2025 04:00), Max: 98.9 (28 Apr 2025 08:00)  HR: 120 (29 Apr 2025 06:00) (58 - 120)  BP: 169/80 (29 Apr 2025 06:00) (113/73 - 178/80)  BP(mean): 113 (29 Apr 2025 06:00) (79 - 123)  RR: 24 (29 Apr 2025 06:00) (10 - 26)  SpO2: 100% (29 Apr 2025 06:00) (90% - 100%)    O2 Parameters below as of 28 Apr 2025 11:00  Patient On (Oxygen Delivery Method): room air      28 Apr 2025 07:01  -  29 Apr 2025 07:00  --------------------------------------------------------  IN:    Lactated Ringers: 800 mL    Norepinephrine: 12.5 mL    Norepinephrine: 87.5 mL    Oral Fluid: 480 mL  Total IN: 1380 mL    OUT:    Voided (mL): 1250 mL  Total OUT: 1250 mL    Total NET: 130 mL          MEDICATIONS  (STANDING):  aspirin  chewable 81 milliGRAM(s) Oral daily  atorvastatin 80 milliGRAM(s) Oral at bedtime  carbidopa/levodopa  25/250 2 Tablet(s) Oral <User Schedule>  carbidopa/levodopa CR 50/200 1 Tablet(s) Oral <User Schedule>  chlorhexidine 2% Cloths 1 Application(s) Topical <User Schedule>  clopidogrel Tablet 75 milliGRAM(s) Oral daily  enoxaparin Injectable 40 milliGRAM(s) SubCutaneous every 24 hours  entacapone 200 milliGRAM(s) Oral <User Schedule>  lactated ringers. 1000 milliLiter(s) (40 mL/Hr) IV Continuous <Continuous>  levETIRAcetam 500 milliGRAM(s) Oral two times a day  midodrine 7.5 milliGRAM(s) Oral every 8 hours  mirtazapine 7.5 milliGRAM(s) Oral at bedtime  norepinephrine Infusion 0.049 MICROgram(s)/kG/Min (6.16 mL/Hr) IV Continuous <Continuous>  polyethylene glycol 3350 17 Gram(s) Oral two times a day  rivastigmine patch  9.5 mG/24 Hr(s) 1 Patch Transdermal every 24 hours  senna 2 Tablet(s) Oral at bedtime    MEDICATIONS  (PRN):  acetaminophen     Tablet .. 650 milliGRAM(s) Oral every 6 hours PRN Temp greater or equal to 38C (100.4F), Mild Pain (1 - 3)      04-29    137  |  104  |  15  ----------------------------<  100[H]  4.8   |  24  |  0.8    Ca    9.2      29 Apr 2025 04:45  Phos  4.1     04-29  Mg     2.1     04-29    TPro  5.6[L]  /  Alb  3.4[L]  /  TBili  0.8  /  DBili  x   /  AST  33  /  ALT  18  /  AlkPhos  107  04-29                          14.6   9.57  )-----------( 143      ( 29 Apr 2025 04:45 )             42.2         Stroke Core Measures   HGBA1C- 5.6  LDL- 44  Trig- 79  TSH- .88    Troponin =- 14      EKG-     CT Angio Brain Stroke Protocol  w/ IV Cont (04.27.25 @ 17:08) >  IMPRESSION:    CT PERFUSION:  Ischemic tissue volume estimate of 57 cc predominantly in the left   frontal lobe with additional scattered areas throughout the right   parieto-occipital areas.    CTA HEAD/NECK:    Stable exam since 4/23/2025, with critical right M1-M2 stenosis    Stable scattered stenoses as above.       MR Head No Cont (04.25.25 @ 17:25) >  IMPRESSION:    Scattered multiple foci of acute infarcts in multiple vascular   territories as described. No mass effect or midline shift.    Multiple tiny foci showing signal dropout/blooming artifact on   susceptibility weighted images at right subinsular region, bilateral   thalami, right basal ganglia, bilateral medial temporal lobes and left   cerebellum, probably representing microhemorrhages.       TTE Echo Complete w/o Contrast w/ Doppler (04.24.25 @ 13:16) >  Summary:   1. Normal global left ventricular systolic function.   2. LV Ejection Fraction by Garber's Method with a biplane EF of 69 %.   3. Spectral Doppler shows impaired relaxation pattern of left   ventricular myocardial filling (Grade I diastolic dysfunction).   4. Normal left atrial size.   5. Normal right atrial size.   6. Mild mitral annular calcification.   7. Mild thickening and calcification of the anterior and posterior   mitral valve leaflets.   8. Trace mitral valve regurgitation.   9. Mild tricuspid regurgitation.  10. Sclerotic aortic valve with decreased opening.          EXAMINATION:  General: satup in bed comfortable  HEENT: Anicteric sclerae  Cardiac: [id number]  Lungs: Clear  Abdomen: Soft, non-tender, +BS  Extremities: No c/c/e  Skin/Incision Site: Clean, dry and intact  Neurologic: Awake, alert and following more briskly, severe expressive aphasic (modestly more verbal today, although paucity of speech noted), oriented x3 with choices , follows on all four, R facial palsy, eomi, unable to asses vf, SALMERON spont AG, no drift, no ataxia, mild tactile extinction                                SUMMARY:  73 y/o M with PMHx of parkinsons, HTN, DM, gout, seizures on keppra, L bhatt radiate stroke in January 2025 on ASA 81mg and residual expressive aphasia/dysarthria/R facial droop, presenting to the ED with worsening word finding difficulty, now unable to speak at all. LKW 9:30pm yesterday, per family member, pt was speaking to her on the phone yesterday speaking in full sentences when he suddenly began to speak one word at a time. This morning, a different family member was speaking to him, states he was saying one word at a time and then suddenly stopped speaking entirely. NIHSS 10 upon arrival for severe aphasia/dysarthria as pt nonverbal, intermittently able to follow commands, baseline R facial droop, LOC questions. NI alert activated 14:02. CTH negative, CTA head and neck with focal occlusion of L MCA M2/M3, CTP with 41 mL of delayed perfusion in the left frontal lobe correlating with the CTA finding. No tnk as pt out of window. NI actual activated. During angiogram, no occlusion observed to perform thrombectomy on however diffuse ICAD vs. vasospasm was sen in the left ICA for which he received 10mg IA Verapamil with improvement in vessel narrowing  Post-op NIH 9. Exam improved to NIH 5 w/ BP augmentation at SBP 178mm Hg.Pt admitted to NSICU for BP augmentation following CVA.    Admission scores:  NIH 10  mRS 1     penicillin (Unknown)  cefaclor (Unknown)  sulfa drugs (Unknown)    Intolerances  OVERNIGHT EVENTS: remained on IV norepi for hemodynamic augmentation, tapering down for sbp goals- - < 150; nihss stable 5-6    ADMISSION SCORES:   nihss 9 preprocedure, 5 post procedure      REVIEW OF SYSTEMS: none    ICU Vital Signs Last 24 Hrs  T(C): 36.8 (29 Apr 2025 04:00), Max: 37.2 (28 Apr 2025 08:00)  T(F): 98.2 (29 Apr 2025 04:00), Max: 98.9 (28 Apr 2025 08:00)  HR: 120 (29 Apr 2025 06:00) (58 - 120)  BP: 169/80 (29 Apr 2025 06:00) (113/73 - 178/80)  BP(mean): 113 (29 Apr 2025 06:00) (79 - 123)  RR: 24 (29 Apr 2025 06:00) (10 - 26)  SpO2: 100% (29 Apr 2025 06:00) (90% - 100%)    O2 Parameters below as of 28 Apr 2025 11:00  Patient On (Oxygen Delivery Method): room air      28 Apr 2025 07:01  -  29 Apr 2025 07:00  --------------------------------------------------------  IN:    Lactated Ringers: 800 mL    Norepinephrine: 12.5 mL    Norepinephrine: 87.5 mL    Oral Fluid: 480 mL  Total IN: 1380 mL    OUT:    Voided (mL): 1250 mL  Total OUT: 1250 mL    Total NET: 130 mL          MEDICATIONS  (STANDING):  aspirin  chewable 81 milliGRAM(s) Oral daily  atorvastatin 80 milliGRAM(s) Oral at bedtime  carbidopa/levodopa  25/250 2 Tablet(s) Oral <User Schedule>  carbidopa/levodopa CR 50/200 1 Tablet(s) Oral <User Schedule>  chlorhexidine 2% Cloths 1 Application(s) Topical <User Schedule>  clopidogrel Tablet 75 milliGRAM(s) Oral daily  enoxaparin Injectable 40 milliGRAM(s) SubCutaneous every 24 hours  entacapone 200 milliGRAM(s) Oral <User Schedule>  lactated ringers. 1000 milliLiter(s) (40 mL/Hr) IV Continuous <Continuous>  levETIRAcetam 500 milliGRAM(s) Oral two times a day  midodrine 7.5 milliGRAM(s) Oral every 8 hours  mirtazapine 7.5 milliGRAM(s) Oral at bedtime  polyethylene glycol 3350 17 Gram(s) Oral two times a day  rivastigmine patch  9.5 mG/24 Hr(s) 1 Patch Transdermal every 24 hours  senna 2 Tablet(s) Oral at bedtime    MEDICATIONS  (PRN):  acetaminophen     Tablet .. 650 milliGRAM(s) Oral every 6 hours PRN Temp greater or equal to 38C (100.4F), Mild Pain (1 - 3)      04-29    137  |  104  |  15  ----------------------------<  100[H]  4.8   |  24  |  0.8    Ca    9.2      29 Apr 2025 04:45  Phos  4.1     04-29  Mg     2.1     04-29    TPro  5.6[L]  /  Alb  3.4[L]  /  TBili  0.8  /  DBili  x   /  AST  33  /  ALT  18  /  AlkPhos  107  04-29                          14.6   9.57  )-----------( 143 ( chronic )      ( 29 Apr 2025 04:45 )             42.2         Stroke Core Measures   HGBA1C- 5.6  LDL- 44  Trig- 79  TSH- .88    Troponin =- 14      EKG-    CT Angio Brain Stroke Protocol  w/ IV Cont (04.27.25 @ 17:08) >  IMPRESSION:    CT PERFUSION:  Ischemic tissue volume estimate of 57 cc predominantly in the left   frontal lobe with additional scattered areas throughout the right   parieto-occipital areas.    CTA HEAD/NECK:    Stable exam since 4/23/2025, with critical right M1-M2 stenosis    Stable scattered stenoses as above.       MR Head No Cont (04.25.25 @ 17:25) >  IMPRESSION:    Scattered multiple foci of acute infarcts in multiple vascular   territories as described. No mass effect or midline shift.    Multiple tiny foci showing signal dropout/blooming artifact on   susceptibility weighted images at right subinsular region, bilateral   thalami, right basal ganglia, bilateral medial temporal lobes and left   cerebellum, probably representing microhemorrhages.       TTE Echo Complete w/o Contrast w/ Doppler (04.24.25 @ 13:16) >  Summary:   1. Normal global left ventricular systolic function.   2. LV Ejection Fraction by Garber's Method with a biplane EF of 69 %.   3. Spectral Doppler shows impaired relaxation pattern of left   ventricular myocardial filling (Grade I diastolic dysfunction).   4. Normal left atrial size.   5. Normal right atrial size.   6. Mild mitral annular calcification.   7. Mild thickening and calcification of the anterior and posterior   mitral valve leaflets.   8. Trace mitral valve regurgitation.   9. Mild tricuspid regurgitation.  10. Sclerotic aortic valve with decreased opening.          EXAMINATION:  General: satup in bed comfortable  HEENT: Anicteric sclerae  Cardiac: [id number]  Lungs: Clear  Abdomen: Soft, non-tender, +BS  Extremities: No c/c/e  Skin/Incision Site: Clean, dry and intact  Neurologic: Awake, alert and following more briskly, severe expressive aphasic (modestly more verbal today, although paucity of speech noted), oriented x3 with choices , follows on all four, R facial palsy, eomi, unable to asses vf, SALMERON spont AG, no drift, no ataxia, mild tactile extinction

## 2025-04-30 ENCOUNTER — TRANSCRIPTION ENCOUNTER (OUTPATIENT)
Age: 75
End: 2025-04-30

## 2025-04-30 LAB
ALBUMIN SERPL ELPH-MCNC: 3.3 G/DL — LOW (ref 3.5–5.2)
ALP SERPL-CCNC: 102 U/L — SIGNIFICANT CHANGE UP (ref 30–115)
ALT FLD-CCNC: 11 U/L — SIGNIFICANT CHANGE UP (ref 0–41)
ANION GAP SERPL CALC-SCNC: 13 MMOL/L — SIGNIFICANT CHANGE UP (ref 7–14)
APPEARANCE UR: ABNORMAL
AST SERPL-CCNC: 32 U/L — SIGNIFICANT CHANGE UP (ref 0–41)
BASOPHILS # BLD AUTO: 0.04 K/UL — SIGNIFICANT CHANGE UP (ref 0–0.2)
BASOPHILS NFR BLD AUTO: 0.3 % — SIGNIFICANT CHANGE UP (ref 0–1)
BILIRUB SERPL-MCNC: 0.8 MG/DL — SIGNIFICANT CHANGE UP (ref 0.2–1.2)
BILIRUB UR-MCNC: ABNORMAL
BUN SERPL-MCNC: 14 MG/DL — SIGNIFICANT CHANGE UP (ref 10–20)
CALCIUM SERPL-MCNC: 8.9 MG/DL — SIGNIFICANT CHANGE UP (ref 8.4–10.5)
CHLORIDE SERPL-SCNC: 103 MMOL/L — SIGNIFICANT CHANGE UP (ref 98–110)
CO2 SERPL-SCNC: 24 MMOL/L — SIGNIFICANT CHANGE UP (ref 17–32)
COLOR SPEC: SIGNIFICANT CHANGE UP
CREAT SERPL-MCNC: 0.9 MG/DL — SIGNIFICANT CHANGE UP (ref 0.7–1.5)
DIFF PNL FLD: NEGATIVE — SIGNIFICANT CHANGE UP
EGFR: 91 ML/MIN/1.73M2 — SIGNIFICANT CHANGE UP
EGFR: 91 ML/MIN/1.73M2 — SIGNIFICANT CHANGE UP
EOSINOPHIL # BLD AUTO: 0.15 K/UL — SIGNIFICANT CHANGE UP (ref 0–0.7)
EOSINOPHIL NFR BLD AUTO: 1.3 % — SIGNIFICANT CHANGE UP (ref 0–8)
GLUCOSE SERPL-MCNC: 89 MG/DL — SIGNIFICANT CHANGE UP (ref 70–99)
GLUCOSE UR QL: NEGATIVE MG/DL — SIGNIFICANT CHANGE UP
HCT VFR BLD CALC: 41.2 % — LOW (ref 42–52)
HGB BLD-MCNC: 14.4 G/DL — SIGNIFICANT CHANGE UP (ref 14–18)
IMM GRANULOCYTES NFR BLD AUTO: 0.3 % — SIGNIFICANT CHANGE UP (ref 0.1–0.3)
KETONES UR-MCNC: 15 MG/DL
LEUKOCYTE ESTERASE UR-ACNC: ABNORMAL
LYMPHOCYTES # BLD AUTO: 18.8 % — LOW (ref 20.5–51.1)
LYMPHOCYTES # BLD AUTO: 2.25 K/UL — SIGNIFICANT CHANGE UP (ref 1.2–3.4)
MAGNESIUM SERPL-MCNC: 1.9 MG/DL — SIGNIFICANT CHANGE UP (ref 1.8–2.4)
MCHC RBC-ENTMCNC: 34 PG — HIGH (ref 27–31)
MCHC RBC-ENTMCNC: 35 G/DL — SIGNIFICANT CHANGE UP (ref 32–37)
MCV RBC AUTO: 97.2 FL — HIGH (ref 80–94)
MONOCYTES # BLD AUTO: 1.08 K/UL — HIGH (ref 0.1–0.6)
MONOCYTES NFR BLD AUTO: 9 % — SIGNIFICANT CHANGE UP (ref 1.7–9.3)
NEUTROPHILS # BLD AUTO: 8.41 K/UL — HIGH (ref 1.4–6.5)
NEUTROPHILS NFR BLD AUTO: 70.3 % — SIGNIFICANT CHANGE UP (ref 42.2–75.2)
NITRITE UR-MCNC: POSITIVE
NRBC BLD AUTO-RTO: 0 /100 WBCS — SIGNIFICANT CHANGE UP (ref 0–0)
PH UR: 7.5 — SIGNIFICANT CHANGE UP (ref 5–8)
PHOSPHATE SERPL-MCNC: 3.7 MG/DL — SIGNIFICANT CHANGE UP (ref 2.1–4.9)
PLATELET # BLD AUTO: 144 K/UL — SIGNIFICANT CHANGE UP (ref 130–400)
PMV BLD: 9.8 FL — SIGNIFICANT CHANGE UP (ref 7.4–10.4)
POTASSIUM SERPL-MCNC: 4.3 MMOL/L — SIGNIFICANT CHANGE UP (ref 3.5–5)
POTASSIUM SERPL-SCNC: 4.3 MMOL/L — SIGNIFICANT CHANGE UP (ref 3.5–5)
PROT SERPL-MCNC: 5.8 G/DL — LOW (ref 6–8)
PROT UR-MCNC: 30 MG/DL
RBC # BLD: 4.24 M/UL — LOW (ref 4.7–6.1)
RBC # FLD: 12.7 % — SIGNIFICANT CHANGE UP (ref 11.5–14.5)
SODIUM SERPL-SCNC: 140 MMOL/L — SIGNIFICANT CHANGE UP (ref 135–146)
SP GR SPEC: 1.02 — SIGNIFICANT CHANGE UP (ref 1–1.03)
UROBILINOGEN FLD QL: 1 MG/DL — SIGNIFICANT CHANGE UP (ref 0.2–1)
WBC # BLD: 11.97 K/UL — HIGH (ref 4.8–10.8)
WBC # FLD AUTO: 11.97 K/UL — HIGH (ref 4.8–10.8)

## 2025-04-30 PROCEDURE — 99223 1ST HOSP IP/OBS HIGH 75: CPT

## 2025-04-30 PROCEDURE — 99232 SBSQ HOSP IP/OBS MODERATE 35: CPT

## 2025-04-30 RX ADMIN — CLOPIDOGREL BISULFATE 75 MILLIGRAM(S): 75 TABLET, FILM COATED ORAL at 11:47

## 2025-04-30 RX ADMIN — Medication 1 TABLET(S): at 22:51

## 2025-04-30 RX ADMIN — ENTACAPONE 200 MILLIGRAM(S): 200 TABLET, FILM COATED ORAL at 06:12

## 2025-04-30 RX ADMIN — Medication 2 TABLET(S): at 17:29

## 2025-04-30 RX ADMIN — ENTACAPONE 200 MILLIGRAM(S): 200 TABLET, FILM COATED ORAL at 21:50

## 2025-04-30 RX ADMIN — Medication 500 MILLILITER(S): at 00:44

## 2025-04-30 RX ADMIN — ATORVASTATIN CALCIUM 80 MILLIGRAM(S): 80 TABLET, FILM COATED ORAL at 22:51

## 2025-04-30 RX ADMIN — ENTACAPONE 200 MILLIGRAM(S): 200 TABLET, FILM COATED ORAL at 11:48

## 2025-04-30 RX ADMIN — MIRTAZAPINE 7.5 MILLIGRAM(S): 30 TABLET, FILM COATED ORAL at 22:52

## 2025-04-30 RX ADMIN — ENOXAPARIN SODIUM 40 MILLIGRAM(S): 100 INJECTION SUBCUTANEOUS at 13:19

## 2025-04-30 RX ADMIN — RIVASTIGMINE 1 PATCH: 13.3 PATCH, EXTENDED RELEASE TRANSDERMAL at 07:18

## 2025-04-30 RX ADMIN — Medication 40 MILLILITER(S): at 07:30

## 2025-04-30 RX ADMIN — RIVASTIGMINE 1 PATCH: 13.3 PATCH, EXTENDED RELEASE TRANSDERMAL at 13:19

## 2025-04-30 RX ADMIN — RIVASTIGMINE 1 PATCH: 13.3 PATCH, EXTENDED RELEASE TRANSDERMAL at 13:23

## 2025-04-30 RX ADMIN — Medication 1 APPLICATION(S): at 06:12

## 2025-04-30 RX ADMIN — MIDODRINE HYDROCHLORIDE 7.5 MILLIGRAM(S): 5 TABLET ORAL at 06:11

## 2025-04-30 RX ADMIN — Medication 500 MILLILITER(S): at 05:10

## 2025-04-30 RX ADMIN — MIDODRINE HYDROCHLORIDE 7.5 MILLIGRAM(S): 5 TABLET ORAL at 13:33

## 2025-04-30 RX ADMIN — ENTACAPONE 200 MILLIGRAM(S): 200 TABLET, FILM COATED ORAL at 17:29

## 2025-04-30 RX ADMIN — Medication 2 TABLET(S): at 22:51

## 2025-04-30 RX ADMIN — Medication 81 MILLIGRAM(S): at 11:48

## 2025-04-30 RX ADMIN — Medication 2 TABLET(S): at 11:48

## 2025-04-30 RX ADMIN — LEVETIRACETAM 500 MILLIGRAM(S): 10 INJECTION, SOLUTION INTRAVENOUS at 17:29

## 2025-04-30 RX ADMIN — LEVETIRACETAM 500 MILLIGRAM(S): 10 INJECTION, SOLUTION INTRAVENOUS at 06:11

## 2025-04-30 RX ADMIN — MIDODRINE HYDROCHLORIDE 7.5 MILLIGRAM(S): 5 TABLET ORAL at 22:51

## 2025-04-30 RX ADMIN — RIVASTIGMINE 1 PATCH: 13.3 PATCH, EXTENDED RELEASE TRANSDERMAL at 19:26

## 2025-04-30 RX ADMIN — Medication 2 TABLET(S): at 06:11

## 2025-04-30 NOTE — PROGRESS NOTE ADULT - ATTENDING COMMENTS
Patient seen and examined and agree with above except as noted.  Patients history, labs, imaging, vitals, meds and notes reviewed personally.    Plan as above

## 2025-04-30 NOTE — CONSULT NOTE ADULT - PROVIDER SPECIALTY LIST ADULT
Chief Complaint   Patient presents with    Office Visit     12 month follow up- waist up and legs skin exam     Derm Problem     History of actinic keratoses  Seborrheic dermatitis   Eczema       HISTORY OF PRESENT ILLNESS:     The patient is a 62 year old female who goes by the name Vickie.        The patient presents in 12  month followup regarding history of actinic keratoses.    I last treated 0 actinic keratoses on this patient with liquid nitrogen on 11/16/2023.        Additionally, the patient presents in 12 month follow up regarding history of seborrheic dermatitis.     Location:  The seborrheic dermatitis currently involves the following parts of the body:  Ears and eyelids     Duration:  The seborrheic dermatitis was first noticed by the patient around the following time:   2020  Quality:     The seborrheic dermatitis is asymptomatic   Severity:    The severity of the symptoms are none  Modifying Factors:  Treatments currently being used by the patient include:     The patient applies Hydrocortisone 2.5% lotion a couple of times per month to the ears when rash is scaly and present.      The patient applied the Protopic 0.1% Ointment 3 times per week to the scaly rash of the eyelid after her last office visit for a couple of months. The patient last applied 6 months ago.            Additionally, the patient presents in 6 year followup regarding ECZEMA.     Location:  The rash started on the following part of the body:   Left arm.                    The rash currently involves the following parts of the body: Left arm      Duration:  The rash was first noticed by the patient around the following time:   years  Quality:     The rash  itches and she gets red bumps.  Severity:    The severity of the symptoms are none  Modifying Factors:  Treatments currently being used include none.    The patient applies Triamcinolone 0.1% cream 2 times per months to the left arm. States she typically uses it more in the 
Physiatry
Hospitalist
summer versus the winter.                                        The soap the patient is using is Dove.                                   The moisturizer the patient is using is Aveeno or Vanicream moisturizing cream        Does the patient think that  the rash is controlled?    Yes       Does the  patient use the over the medication, sunscreen of at least SPF 30?  Yes  Does the patient avoid the peak sun hours of 10 am to 4 pm?    Yes  Does the patient wear sun  protective clothing?      Yes      When the patient was asked if there are any spots on their skin that they would like to point out today, they responded:      they would like to point out a lesion on the mid occipital scalp that she states she tends to pick and is bothersome.        Additionally, the patient points out 5 specific skin tags on the left base of the neck which do become painful when caught on the necklace.    The patient would like these lesions removed.          Additionally, she has noticed some dryness on the left eyebrow.      Additionally, she points out a brown lesion on the right buttock which has been present for years.  She would like it examined to be sure it is not a dangerous lesion.      The patient states she is not wearing make-up today      REVIEW OF SYSTEMS:  Constitutional:  In general, does the patient feel well?  Yes    Cardiovascular:    Does the patient have a pacemaker?  No    Does the patient have a defibrillator?  No    Hematologic:  The patient bleeds easily because of being on aspirin or an anticoagulant:  No     ALLERGIES:   Allergen Reactions    Atorvastatin MYALGIA    Hydrocodone Other (See Comments)    Penicillins RASH    Percocet [Oxycodone-Acetaminophen] RASH     Percocet 5/325    Sulfa Drugs Cross Reactors RASH       Current Outpatient Medications   Medication Sig    hydroCORTisone (CORTIZONE) 2.5 % lotion Apply once a day to scaly itchy rash of ears as needed for scaling and itching.    tacrolimus 
(Protopic) 0.1 % ointment Apply twice a day to scaly rash on eyelids as needed for scaling.    triamcinolone (ARISTOCORT) 0.1 % cream Apply once a day to scaly rash located left arm as needed for itching and scaling    blood glucose test strip Test blood sugar 1 time daily. Diagnosis: R73.03. Meter: Insurance preferred brand.    metFORMIN (GLUCOPHAGE-XR) 500 MG 24 hr tablet Take 3 tablets daily with dinner    rosuvastatin (CRESTOR) 5 MG tablet Take 1 tablet by mouth daily.    mirtazapine (REMERON) 30 MG tablet Take 1 tablet by mouth daily.    Ventolin  (90 Base) MCG/ACT inhaler Inhale 2 puffs into the lungs every 4 hours as needed for Shortness of Breath.    blood glucose lancets Test blood sugar 1 time daily. Diagnosis: R73.03. Meter: Insurance preferred brand.    blood glucose meter Test blood sugar 1 time daily. Diagnosis: R73.03. Meter: Insurance preferred brand    Spacer/Aero-Holding Chambers (AEROCHAMBER) Use with Flovent twice a day     No current facility-administered medications for this visit.       PAST MEDICAL HISTORY:      Hyperlipidemia                                                GERD (gastroesophageal reflux disease)                        PMS (premenstrual syndrome)                                   Synovial sarcoma  (CMD)                                         Comment: left forearm    Disorder of bone and cartilage, unspecified     12/27/2012    Thyroid nodule                                                Diabetes  (CMD)                                                 Comment: PRE DIABETES, BEING MONITORED    Allergy                                                       Asthma (CMD)                                                  Keratosis, actinic                                            Eczema                                                        Ear ringing                                                   Breast cancer screening other than mammogram    04/30/2019      Comment: 
SIMON (AVERAGE RISK) 10YR RISK 2.6%, LIFETIME                RISK 8.7%, BRCA 1/2 RISK 0.02% / 0.06%      DERMATOLOGY PAST MEDICAL HISTORY:      Actinic keratoses treated with liquid nitrogen.     Eczema of left elbow treated with triamcinolone 0.1% cream September 20, 2012.      Seborrheic dermatitis of ears July 27, 2020 treated with hydrocortisone 2.5% lotion.     Dyshidrotic dermatitis of left mid palm February 11, 2011 treated with triamcinolone 0.1% cream.      Inflamed seborrheic keratoses treated with liquid nitrogen.     Benign lichenoid keratosis on shave biopsy of a 7 mm pink scaly macule left volar forearm 80 mm proximal to the wrist on March 22, 2018.     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          FAMILY HISTORY:  Does the patient have a  family history of melanoma? Yes Father         SOCIAL HISTORY:       PHYSICAL EXAMINATION:      Actinic keratoses treated today with Liquid Nitrogen today as follows:  Left dorsal forearm 20 mm proximal to the wrist 6 mm pink scaly macule consistent with actinic keratosis         Seborrheic dermatitis as follows:   Mild scaly left upper lateral eyebrow        Benign nevus as follows:  Right mid buttock 4 mm brown papule consistent  with benign nevus        Inflamed skin tags x 5 removed with scissors technique today as follows:  Left base of neck 5 inflamed tags        Eczema as follows:  No eczema on exam         No lesion identified on the mid occipital scalp where the patient pointed       No other significant findings on  EXAMINATION FROM THE WAIST UP AND LEGS  which is a DETAILED  EXAMINATION  including palpation and inspection of the scalp and hair and inspection of the head and face, eyelids, lips, neck, chest (including breasts and underarms-including eccrine and apocrine glands), abdomen, back and right and left upper and lower extremities.  The 
patient is well nourished and well developed.      The patient was seen and examined by Edgardo Alba MD.  in the presence of my medical assistant  Sarahi Sanches MA .  This office visit note was in part created by Sarahi Sanches MA acting also as a scribe for Edgardo Alba MD.  Edgardo Alba MD.   reviewed the note for accuracy and completeness before signing.        Assessment and Plan:    1.  Actinic Keratoses x 1    The diagnosis was discussed.  I recommended destruction with liquid nitrogen and after a discussion of risks and benefits of liquid nitrogen treatment the patient gave oral consent for the same.  Therefore liquid nitrogen was used to destroy the lesion(s).    Return to clinic in 1 year for waist up, legs and buttocks skin exam regarding history of actinic keratoses, eczema and seborrheic dermatitis.        2.  Sun damaged skin (dermatoheliosis)    For management of this problem I recommend:  Avoiding the peak sun hours of 10 am to 4 pm  Wearing appropriate sun protection clothing.  We recommend the patient use the over the counter medication, Sunscreen with SPF 30.        3. Seborrheic dermatitis of the face     I recommend the patient apply  VANICREAM MOISTURIZING CREAM daily to trunk and extremities.  (This  contains PROPYLENE GLYCOL.)        I approved refills of  Hydrocortisone 2.5% Lotion.  Apply once a day to itchy scaly rash of ears as needed for scaling and itching. Dispense 59  mL.  Refills 11.     I approved refills of Protopic 0.1% Ointment (tacrolimus 0.1% ointment).  Apply twice a day to scaly rash on eyelids as needed for itching and scalying.  Dispense 30 gram tube.  Refills 11.      The patient was instructed to apply the Hydrocortisone 2.5% Lotion &  Protopic 0.1% Ointment the least frequency necessary to keep the itchy scaly rash of face, ears and eyelids under control.    Return to clinic 1 year.          4.  Eczema     I recommend the patient apply  VANICREAM 
MOISTURIZING CREAM daily to trunk and extremities.  (This  contains PROPYLENE GLYCOL.)     I approved refills of  Triamcinolone 0.1% CREAM Apply once a day to scaly rash located left arm as needed for itching and scaling.  Dispense 80 grams.  Refills 11.     The patient was instructed to continue to apply the Triamcinolone 0.1% cream the least frequency necessary to keep the itchy scaly rash under control.    Return to clinic 1 year.        5. Benign nevus    The diagnosis was discussed.  The patient was taught the A, B, C, D, Es of melanoma detection and was given a handout on the same.  The patient is to examine the skin on a monthly basis, and if they notice any of the changes as outlined in the brochure, they should return for re-evaluation.        6. Scissors removal of Inflamed skin tags x  5    After discussion of risks and benefits of scissors removal the patient gave verbal consent for the same.  Aluminum chloride 20% solution was applied and the inflamed skin tag(s) were removed with scissors technique.          On 11/18/2024, ISarahi MA scribed the services personally performed by Edgardo Alba MD  The documentation recorded by the scribe accurately and completely reflects the service(s) I personally performed and the decisions made by me.

## 2025-04-30 NOTE — PROGRESS NOTE ADULT - SUBJECTIVE AND OBJECTIVE BOX
Neurology Progress Note    INTERVAL HPI/OVERNIGHT EVENTS:  Patient seen and examined.    MEDICATIONS  (STANDING):  aspirin  chewable 81 milliGRAM(s) Oral daily  atorvastatin 80 milliGRAM(s) Oral at bedtime  carbidopa/levodopa  25/250 2 Tablet(s) Oral <User Schedule>  carbidopa/levodopa CR 50/200 1 Tablet(s) Oral <User Schedule>  chlorhexidine 2% Cloths 1 Application(s) Topical <User Schedule>  clopidogrel Tablet 75 milliGRAM(s) Oral daily  enoxaparin Injectable 40 milliGRAM(s) SubCutaneous every 24 hours  entacapone 200 milliGRAM(s) Oral <User Schedule>  lactated ringers. 1000 milliLiter(s) (40 mL/Hr) IV Continuous <Continuous>  levETIRAcetam 500 milliGRAM(s) Oral two times a day  midodrine 7.5 milliGRAM(s) Oral every 8 hours  mirtazapine 7.5 milliGRAM(s) Oral at bedtime  polyethylene glycol 3350 17 Gram(s) Oral two times a day  rivastigmine patch  9.5 mG/24 Hr(s) 1 Patch Transdermal every 24 hours  senna 2 Tablet(s) Oral at bedtime    MEDICATIONS  (PRN):  acetaminophen     Tablet .. 650 milliGRAM(s) Oral every 6 hours PRN Temp greater or equal to 38C (100.4F), Mild Pain (1 - 3)      Allergies    penicillin (Unknown)  cefaclor (Unknown)  sulfa drugs (Unknown)    Intolerances        Vital Signs Last 24 Hrs  T(C): 36.3 (30 Apr 2025 04:00), Max: 36.7 (29 Apr 2025 08:00)  T(F): 97.4 (30 Apr 2025 04:00), Max: 98.1 (29 Apr 2025 08:00)  HR: 74 (30 Apr 2025 06:05) (58 - 100)  BP: 120/78 (30 Apr 2025 06:05) (108/74 - 168/87)  BP(mean): 92 (30 Apr 2025 06:05) (87 - 127)  RR: 18 (30 Apr 2025 04:00) (14 - 21)  SpO2: 100% (30 Apr 2025 05:30) (98% - 100%)    Parameters below as of 30 Apr 2025 04:00  Patient On (Oxygen Delivery Method): room air        Physical exam:  General: No acute distress, awake and alert    Neurologic:  -Mental status: Awake, alert, oriented to person, place, and time. Speech is fluent with intact naming, repetition, and comprehension, no dysarthria. Recent and remote memory intact. Follows commands. Attention/concentration intact. Fund of knowledge appropriate.  -Cranial nerves:   II: Visual fields are full to confrontation.  III, IV, VI: Extraocular movements are intact without nystagmus. Pupils equally round and reactive to light  V:  Facial sensation V1-V3 equal and intact   VII: Face is symmetric with normal eye closure and smile  VIII: Hearing is bilaterally intact to finger rub  IX, X: Uvula is midline and soft palate rises symmetrically  XI: Head turning and shoulder shrug are intact.  XII: Tongue protrudes midline  Motor: Normal bulk and tone. No pronator drift. Strength bilateral upper extremity 5/5, bilateral lower extremities 5/5.  Rapid alternating movements intact and symmetric  Sensation: Intact to light touch bilaterally. No neglect or extinction on double simultaneous testing.  Coordination: No dysmetria on finger-to-nose and heel-to-shin bilaterally  Reflexes: Downgoing toes bilaterally   Gait: Narrow gait and steady    NIHSS:     LABS:                        14.6   9.57  )-----------( 143      ( 29 Apr 2025 04:45 )             42.2     04-29    137  |  104  |  15  ----------------------------<  100[H]  4.8   |  24  |  0.8    Ca    9.2      29 Apr 2025 04:45  Phos  4.1     04-29  Mg     2.1     04-29    TPro  5.6[L]  /  Alb  3.4[L]  /  TBili  0.8  /  DBili  x   /  AST  33  /  ALT  18  /  AlkPhos  107  04-29      Urinalysis Basic - ( 29 Apr 2025 04:45 )    Color: x / Appearance: x / SG: x / pH: x  Gluc: 100 mg/dL / Ketone: x  / Bili: x / Urobili: x   Blood: x / Protein: x / Nitrite: x   Leuk Esterase: x / RBC: x / WBC x   Sq Epi: x / Non Sq Epi: x / Bacteria: x        RADIOLOGY & ADDITIONAL TESTS:     Neurology Progress Note    INTERVAL HPI/OVERNIGHT EVENTS:  S/P  cc bolus x2  Patient seen and examined.    MEDICATIONS  (STANDING):  aspirin  chewable 81 milliGRAM(s) Oral daily  atorvastatin 80 milliGRAM(s) Oral at bedtime  carbidopa/levodopa  25/250 2 Tablet(s) Oral <User Schedule>  carbidopa/levodopa CR 50/200 1 Tablet(s) Oral <User Schedule>  chlorhexidine 2% Cloths 1 Application(s) Topical <User Schedule>  clopidogrel Tablet 75 milliGRAM(s) Oral daily  enoxaparin Injectable 40 milliGRAM(s) SubCutaneous every 24 hours  entacapone 200 milliGRAM(s) Oral <User Schedule>  lactated ringers. 1000 milliLiter(s) (40 mL/Hr) IV Continuous <Continuous>  levETIRAcetam 500 milliGRAM(s) Oral two times a day  midodrine 7.5 milliGRAM(s) Oral every 8 hours  mirtazapine 7.5 milliGRAM(s) Oral at bedtime  polyethylene glycol 3350 17 Gram(s) Oral two times a day  rivastigmine patch  9.5 mG/24 Hr(s) 1 Patch Transdermal every 24 hours  senna 2 Tablet(s) Oral at bedtime    MEDICATIONS  (PRN):  acetaminophen     Tablet .. 650 milliGRAM(s) Oral every 6 hours PRN Temp greater or equal to 38C (100.4F), Mild Pain (1 - 3)      Allergies    penicillin (Unknown)  cefaclor (Unknown)  sulfa drugs (Unknown)    Intolerances        Vital Signs Last 24 Hrs  T(C): 36.3 (30 Apr 2025 04:00), Max: 36.7 (29 Apr 2025 08:00)  T(F): 97.4 (30 Apr 2025 04:00), Max: 98.1 (29 Apr 2025 08:00)  HR: 74 (30 Apr 2025 06:05) (58 - 100)  BP: 120/78 (30 Apr 2025 06:05) (108/74 - 168/87)  BP(mean): 92 (30 Apr 2025 06:05) (87 - 127)  RR: 18 (30 Apr 2025 04:00) (14 - 21)  SpO2: 100% (30 Apr 2025 05:30) (98% - 100%)    Parameters below as of 30 Apr 2025 04:00  Patient On (Oxygen Delivery Method): room air        Physical exam:  General: No acute distress, awake and alert    Neurologic:  -Mental status: Awake, alert and can nod his head yes and no to certain questions. Pt is mute but follows simple commands such as "close your eyes", "lift up your hand and legs". Unclear if pt comprehends all questions asked, unable to follow complex commands.   -Cranial nerves:   II: Visual fields are full to confrontation.  III, IV, VI: Extraocular movements are intact without nystagmus. Pupils equally round and reactive to light  V:  Facial sensation V1-V3 equal and intact   VII: right lower facial droop  VIII: Hearing is bilaterally intact to finger rub  IX, X: Uvula is midline and soft palate rises symmetrically  XI: Head turning and shoulder shrug are intact.  XII: Tongue protrudes midline  Motor: Normal bulk and tone. Strength bilateral upper extremity 5/5, bilateral lower extremities 5-/5.  Sensation: Intact to light touch bilaterally (lifts up the legs that writer touched)  Coordination: No dysmetria on finger-to-nosebilaterally  Reflexes: Downgoing toes bilaterally   Gait: Deferred    NIHSS:     LABS:                        14.6   9.57  )-----------( 143      ( 29 Apr 2025 04:45 )             42.2     04-29    137  |  104  |  15  ----------------------------<  100[H]  4.8   |  24  |  0.8    Ca    9.2      29 Apr 2025 04:45  Phos  4.1     04-29  Mg     2.1     04-29    TPro  5.6[L]  /  Alb  3.4[L]  /  TBili  0.8  /  DBili  x   /  AST  33  /  ALT  18  /  AlkPhos  107  04-29      Urinalysis Basic - ( 29 Apr 2025 04:45 )    Color: x / Appearance: x / SG: x / pH: x  Gluc: 100 mg/dL / Ketone: x  / Bili: x / Urobili: x   Blood: x / Protein: x / Nitrite: x   Leuk Esterase: x / RBC: x / WBC x   Sq Epi: x / Non Sq Epi: x / Bacteria: x        RADIOLOGY & ADDITIONAL TESTS:     Neurology Progress Note    INTERVAL HPI/OVERNIGHT EVENTS:  S/P  cc bolus x2  Patient seen and examined. Is awake and alert. Able to nod his head yes or no to some questions. Pt shakes his yes no when asked if he is in pain or in discomfort.     MEDICATIONS  (STANDING):  aspirin  chewable 81 milliGRAM(s) Oral daily  atorvastatin 80 milliGRAM(s) Oral at bedtime  carbidopa/levodopa  25/250 2 Tablet(s) Oral <User Schedule>  carbidopa/levodopa CR 50/200 1 Tablet(s) Oral <User Schedule>  chlorhexidine 2% Cloths 1 Application(s) Topical <User Schedule>  clopidogrel Tablet 75 milliGRAM(s) Oral daily  enoxaparin Injectable 40 milliGRAM(s) SubCutaneous every 24 hours  entacapone 200 milliGRAM(s) Oral <User Schedule>  lactated ringers. 1000 milliLiter(s) (40 mL/Hr) IV Continuous <Continuous>  levETIRAcetam 500 milliGRAM(s) Oral two times a day  midodrine 7.5 milliGRAM(s) Oral every 8 hours  mirtazapine 7.5 milliGRAM(s) Oral at bedtime  polyethylene glycol 3350 17 Gram(s) Oral two times a day  rivastigmine patch  9.5 mG/24 Hr(s) 1 Patch Transdermal every 24 hours  senna 2 Tablet(s) Oral at bedtime    MEDICATIONS  (PRN):  acetaminophen     Tablet .. 650 milliGRAM(s) Oral every 6 hours PRN Temp greater or equal to 38C (100.4F), Mild Pain (1 - 3)      Allergies    penicillin (Unknown)  cefaclor (Unknown)  sulfa drugs (Unknown)    Intolerances        Vital Signs Last 24 Hrs  T(C): 36.3 (30 Apr 2025 04:00), Max: 36.7 (29 Apr 2025 08:00)  T(F): 97.4 (30 Apr 2025 04:00), Max: 98.1 (29 Apr 2025 08:00)  HR: 74 (30 Apr 2025 06:05) (58 - 100)  BP: 120/78 (30 Apr 2025 06:05) (108/74 - 168/87)  BP(mean): 92 (30 Apr 2025 06:05) (87 - 127)  RR: 18 (30 Apr 2025 04:00) (14 - 21)  SpO2: 100% (30 Apr 2025 05:30) (98% - 100%)    Parameters below as of 30 Apr 2025 04:00  Patient On (Oxygen Delivery Method): room air        Physical exam:  General: No acute distress, awake and alert    Neurologic:  -Mental status: Awake, alert and can nod his head yes and no to certain questions. Pt is mute but follows simple commands such as "close your eyes", "lift up your hand and legs". Unclear if pt comprehends all questions asked, unable to follow complex commands.   -Cranial nerves:   II: Visual fields are full to confrontation.  III, IV, VI: Extraocular movements are intact without nystagmus. Pupils equally round and reactive to light  V:  Facial sensation V1-V3 equal and intact   VII: right lower facial droop  VIII: Hearing is bilaterally intact to finger rub  IX, X: Uvula is midline and soft palate rises symmetrically  XI: Head turning and shoulder shrug are intact.  XII: right tongue deviation (seen in previous admission)   Motor: Normal bulk and tone. Strength bilateral upper extremity 5/5, bilateral lower extremities 5-/5.  Sensation: Intact to light touch bilaterally (lifts up the legs that writer touched)  Coordination: No dysmetria on finger-to-nose bilaterally  Reflexes: Downgoing toes bilaterally   Gait: Deferred    NIHSS: 9 for LOC, right facial, Mute with severe expressive aphasia.     LABS:                        14.6   9.57  )-----------( 143      ( 29 Apr 2025 04:45 )             42.2     04-29    137  |  104  |  15  ----------------------------<  100[H]  4.8   |  24  |  0.8    Ca    9.2      29 Apr 2025 04:45  Phos  4.1     04-29  Mg     2.1     04-29    TPro  5.6[L]  /  Alb  3.4[L]  /  TBili  0.8  /  DBili  x   /  AST  33  /  ALT  18  /  AlkPhos  107  04-29      Urinalysis Basic - ( 29 Apr 2025 04:45 )    Color: x / Appearance: x / SG: x / pH: x  Gluc: 100 mg/dL / Ketone: x  / Bili: x / Urobili: x   Blood: x / Protein: x / Nitrite: x   Leuk Esterase: x / RBC: x / WBC x   Sq Epi: x / Non Sq Epi: x / Bacteria: x        RADIOLOGY & ADDITIONAL TESTS:     Neurology Progress Note    INTERVAL HPI/OVERNIGHT EVENTS:  S/P  cc bolus x2  Patient seen and examined. Is awake and alert. Able to nod his head yes or no to some questions. Pt shakes his yes no when asked if he is in pain or in discomfort.     MEDICATIONS  (STANDING):  aspirin  chewable 81 milliGRAM(s) Oral daily  atorvastatin 80 milliGRAM(s) Oral at bedtime  carbidopa/levodopa  25/250 2 Tablet(s) Oral <User Schedule>  carbidopa/levodopa CR 50/200 1 Tablet(s) Oral <User Schedule>  chlorhexidine 2% Cloths 1 Application(s) Topical <User Schedule>  clopidogrel Tablet 75 milliGRAM(s) Oral daily  enoxaparin Injectable 40 milliGRAM(s) SubCutaneous every 24 hours  entacapone 200 milliGRAM(s) Oral <User Schedule>  lactated ringers. 1000 milliLiter(s) (40 mL/Hr) IV Continuous <Continuous>  levETIRAcetam 500 milliGRAM(s) Oral two times a day  midodrine 7.5 milliGRAM(s) Oral every 8 hours  mirtazapine 7.5 milliGRAM(s) Oral at bedtime  polyethylene glycol 3350 17 Gram(s) Oral two times a day  rivastigmine patch  9.5 mG/24 Hr(s) 1 Patch Transdermal every 24 hours  senna 2 Tablet(s) Oral at bedtime    MEDICATIONS  (PRN):  acetaminophen     Tablet .. 650 milliGRAM(s) Oral every 6 hours PRN Temp greater or equal to 38C (100.4F), Mild Pain (1 - 3)      Allergies    penicillin (Unknown)  cefaclor (Unknown)  sulfa drugs (Unknown)    Intolerances        Vital Signs Last 24 Hrs  T(C): 36.3 (30 Apr 2025 04:00), Max: 36.7 (29 Apr 2025 08:00)  T(F): 97.4 (30 Apr 2025 04:00), Max: 98.1 (29 Apr 2025 08:00)  HR: 74 (30 Apr 2025 06:05) (58 - 100)  BP: 120/78 (30 Apr 2025 06:05) (108/74 - 168/87)  BP(mean): 92 (30 Apr 2025 06:05) (87 - 127)  RR: 18 (30 Apr 2025 04:00) (14 - 21)  SpO2: 100% (30 Apr 2025 05:30) (98% - 100%)    Parameters below as of 30 Apr 2025 04:00  Patient On (Oxygen Delivery Method): room air        Physical exam:  General: No acute distress, awake and alert    Neurologic:  -Mental status: Awake, alert and can nod his head yes and no to certain questions. Pt is mute but follows simple commands such as "close your eyes", "lift up your hand and legs". Unclear if pt comprehends all questions asked, unable to follow complex commands.   -Cranial nerves:   II: Visual fields are full to confrontation.  III, IV, VI: Extraocular movements are intact without nystagmus. Pupils equally round and reactive to light  V:  Facial sensation V1-V3 equal and intact   VII: right lower facial droop  VIII: Hearing is bilaterally intact to finger rub  IX, X: Uvula is midline and soft palate rises symmetrically  XI: Head turning and shoulder shrug are intact.  XII: right tongue deviation (seen in previous admission)   Motor: Normal bulk and tone. Strength bilateral upper extremity 5/5, bilateral lower extremities 5-/5.  Sensation: Intact to light touch bilaterally (lifts up the legs that writer touched)  Coordination: No dysmetria on finger-to-nose bilaterally  Reflexes: Downgoing toes bilaterally   Gait: Deferred    NIHSS: 9 for LOC, right facial, Mute-globally aphasic     LABS:                        14.6   9.57  )-----------( 143      ( 29 Apr 2025 04:45 )             42.2     04-29    137  |  104  |  15  ----------------------------<  100[H]  4.8   |  24  |  0.8    Ca    9.2      29 Apr 2025 04:45  Phos  4.1     04-29  Mg     2.1     04-29    TPro  5.6[L]  /  Alb  3.4[L]  /  TBili  0.8  /  DBili  x   /  AST  33  /  ALT  18  /  AlkPhos  107  04-29      Urinalysis Basic - ( 29 Apr 2025 04:45 )    Color: x / Appearance: x / SG: x / pH: x  Gluc: 100 mg/dL / Ketone: x  / Bili: x / Urobili: x   Blood: x / Protein: x / Nitrite: x   Leuk Esterase: x / RBC: x / WBC x   Sq Epi: x / Non Sq Epi: x / Bacteria: x        RADIOLOGY & ADDITIONAL TESTS:

## 2025-04-30 NOTE — DISCHARGE NOTE PROVIDER - HOSPITAL COURSE
Hospital course:  Patient is a 75 y/o M with PMHx of parkinsons, HTN, DM, gout, seizures on keppra, priorstroke in January 2025 with residual expressive aphasia/dysarthria/R facial droop, who presented to the ED on 6/23 with severe expressive aphasia, LKW 9:30 pm. NIHSS 10 upon arrival for severe aphasia/dysarthria as pt nonverbal, intermittently able to follow commands, baseline R facial droop, LOC questions. mRs 1. NI alert activated 14:02. CTH negative, CTA H&N showed focal occlusion in the M2/M3 junction of the superior division of the left MCA, new since the prior CTA from 1/20/2023. New multifocal severe stenosis in the left A3 FITO. Stable moderate stenosis in the proximal right M1 MCA. New diffuse moderate to severe irregular stenosis in the bilateral PCAs, left worse than right. CTP with 41 mL of delayed perfusion in the left frontal lobe correlating with the CTA finding. No tnk as Pt out of window. NI actual activated. During angiogram, no occlusion observed to perform thrombectomy on however diffuse ICAD vs. vasospasm was seen in the left ICA for which he received 10mg IA Verapamil with improvement in vessel narrowing.  Post-op NIH 9. Exam improved to NIH 5 w/ BP augmentation. Pt was admitted to NSICU for BP augmentation following stroke  While in the NCC, remained on IV levophed for hemodynamic augmentation, tapering down; NIHSS was stable 5-6. On 4/27: aphasia worsened, stroke code was called. Urgent CTH obtained does not show any changes from the prior scan. Now downgraded to stroke unit on 4/29. Currently, Pt with NIHSS of 9 for LOC, right facial, Mute with severe expressive aphasia, can intermittently follow simple commands but not complex commands.     During this hospital course, patient had ccattered multiple foci of acute ischemic infarcts in multiple vascular territories (right subinsular region, bilateral thalami, right basal ganglia, bilateral medial temporal lobes and left cerebellum). Etiology likely ICAD as seen on MRI.     Patient had the following workup done in house:  CT Head: No evidence of acute transcortical infarct, acute intracranial hemorrhage, or mass effect.  MR Head Non Contrast: Scattered multiple foci of acute infarcts in multiple vascular territories as described. No mass effect or midline shift. Multiple tiny foci showing signal dropout/blooming artifact on susceptibility weighted images at right subinsular region, bilateral thalami, right basal ganglia, bilateral medial temporal lobes and left cerebellum, probably representing microhemorrhages.  CT Angio Head and Neck: Focal occlusion in the M2/M3 junction of the superior division of the left MCA, new since the prior CTA from 1/20/2023. New multifocal severe stenosis in the left A3 FITO. Stable moderate stenosis in the proximal right M1 MCA. New diffuse moderate to severe irregular stenosis in the bilateral PCAs, left worse than right.  Stable severe left/moderate right atherosclerotic stenosis in the V4 vertebral arteries.    [x]echo   1. Normal global left ventricular systolic function.   2. LV Ejection Fraction by Garber's Method with a biplane EF of 69 %.   3. Spectral Doppler shows impaired relaxation pattern of left   ventricular myocardial filling (Grade I diastolic dysfunction).   4. Normal left atrial size.   5. Normal right atrial size.   6. Mild mitral annular calcification.   7. Mild thickening and calcification of the anterior and posterior   mitral valve leaflets.   8. Trace mitral valve regurgitation.   9. Mild tricuspid regurgitation.  10. Sclerotic aortic valve with decreased opening.    [x]labs A1c 5.6,  LDL 44, TSH 0.88    Physical exam at discharge:  General: No acute distress, awake and alert    Neurologic:  -Mental status: Awake, alert and can nod his head yes and no to certain questions. Pt is mute but follows simple commands such as "close your eyes", "lift up your hand and legs". Unclear if pt comprehends all questions asked, unable to follow complex commands.   -Cranial nerves:   II: Visual fields are full to confrontation.  III, IV, VI: Extraocular movements are intact without nystagmus. Pupils equally round and reactive to light  V:  Facial sensation V1-V3 equal and intact   VII: right lower facial droop  VIII: Hearing is bilaterally intact to finger rub  IX, X: Uvula is midline and soft palate rises symmetrically  XI: Head turning and shoulder shrug are intact.  XII: right tongue deviation (seen in previous admission)   Motor: Normal bulk and tone. Strength bilateral upper extremity 5/5, bilateral lower extremities 5-/5.  Sensation: Intact to light touch bilaterally (lifts up the legs that writer touched)  Coordination: No dysmetria on finger-to-nose bilaterally  Reflexes: Downgoing toes bilaterally   Gait: Deferred    NIHSS: 9 for LOC, right facial, Mute with severe expressive aphasia.     New medications on discharge: Midodrine 7.5 mg TID  Further outpatient workup: Stroke clinic in 2-4 weeks.    Hospital course:  Patient is a 73 y/o M with PMHx of parkinsons, HTN, DM, gout, seizures on keppra, priorstroke in January 2025 with residual expressive aphasia/dysarthria/R facial droop, who presented to the ED on 6/23 with severe expressive aphasia, LKW 9:30 pm. NIHSS 10 upon arrival for severe aphasia/dysarthria as pt nonverbal, intermittently able to follow commands, baseline R facial droop, LOC questions. mRs 1. NI alert activated 14:02. CTH negative, CTA H&N showed focal occlusion in the M2/M3 junction of the superior division of the left MCA, new since the prior CTA from 1/20/2023. New multifocal severe stenosis in the left A3 FITO. Stable moderate stenosis in the proximal right M1 MCA. New diffuse moderate to severe irregular stenosis in the bilateral PCAs, left worse than right. CTP with 41 mL of delayed perfusion in the left frontal lobe correlating with the CTA finding. No tnk as Pt out of window. NI actual activated. During angiogram, no occlusion observed to perform thrombectomy on however diffuse ICAD vs. vasospasm was seen in the left ICA for which he received 10mg IA Verapamil with improvement in vessel narrowing.  Post-op NIH 9. Exam improved to NIH 5 w/ BP augmentation. Pt was admitted to NSICU for BP augmentation following stroke  While in the NCC, remained on IV levophed for hemodynamic augmentation, tapering down; NIHSS was stable 5-6. On 4/27 his aphasia worsened, stroke code was called. Urgent CTH obtained does not show any changes from the prior scan. Now downgraded to stroke unit on 4/29. Currently, Pt with NIHSS of 9 for LOC, right facial, Mute with global aphasia, can intermittently follow simple commands but not complex commands. Plan to continue DAPT for 90 days total and then Aspirin monotherapy.     During this hospital course, patient had Scattered multiple foci of acute ischemic infarcts in multiple vascular territories (right subinsular region, bilateral thalami, right basal ganglia, bilateral medial temporal lobes and left cerebellum). Etiology likely ICAD as seen on MRI.     Patient had the following workup done in house:  CT Head: No evidence of acute transcortical infarct, acute intracranial hemorrhage, or mass effect.  MR Head Non Contrast: Scattered multiple foci of acute infarcts in multiple vascular territories as described. No mass effect or midline shift. Multiple tiny foci showing signal dropout/blooming artifact on susceptibility weighted images at right subinsular region, bilateral thalami, right basal ganglia, bilateral medial temporal lobes and left cerebellum, probably representing microhemorrhages.  CT Angio Head and Neck: Focal occlusion in the M2/M3 junction of the superior division of the left MCA, new since the prior CTA from 1/20/2023. New multifocal severe stenosis in the left A3 FITO. Stable moderate stenosis in the proximal right M1 MCA. New diffuse moderate to severe irregular stenosis in the bilateral PCAs, left worse than right.  Stable severe left/moderate right atherosclerotic stenosis in the V4 vertebral arteries.    [x]echo   1. Normal global left ventricular systolic function.   2. LV Ejection Fraction by Garber's Method with a biplane EF of 69 %.   3. Spectral Doppler shows impaired relaxation pattern of left   ventricular myocardial filling (Grade I diastolic dysfunction).   4. Normal left atrial size.   5. Normal right atrial size.   6. Mild mitral annular calcification.   7. Mild thickening and calcification of the anterior and posterior   mitral valve leaflets.   8. Trace mitral valve regurgitation.   9. Mild tricuspid regurgitation.  10. Sclerotic aortic valve with decreased opening.    [x]labs A1c 5.6,  LDL 44, TSH 0.88    Physical exam at discharge:  General: No acute distress, awake and alert    Neurologic:  -Mental status: Awake, alert and can nod his head yes and no to certain questions. Pt is mute but follows simple commands such as "close your eyes", "lift up your hand and legs". Unclear if pt comprehends all questions asked, unable to follow complex commands.   -Cranial nerves:   II: Visual fields are full to confrontation.  III, IV, VI: Extraocular movements are intact without nystagmus. Pupils equally round and reactive to light  V:  Facial sensation V1-V3 equal and intact   VII: right lower facial droop  VIII: Hearing is bilaterally intact to finger rub  IX, X: Uvula is midline and soft palate rises symmetrically  XI: Head turning and shoulder shrug are intact.  XII: right tongue deviation (seen in previous admission)   Motor: Normal bulk and tone. Strength bilateral upper extremity 5/5, bilateral lower extremities 5-/5.  Sensation: Intact to light touch bilaterally (lifts up the legs that writer touched)  Coordination: No dysmetria on finger-to-nose bilaterally  Reflexes: Downgoing toes bilaterally   Gait: Deferred    NIHSS: 9 for LOC, right facial, Mute with severe expressive aphasia.     New medications on discharge: Midodrine 7.5 mg TID  Further outpatient workup: Stroke clinic in 2-4 weeks.    Hospital course:  Patient is a 75 y/o M with PMHx of parkinsons, HTN, DM, gout, seizures on keppra, priorstroke in January 2025 with residual expressive aphasia/dysarthria/R facial droop, who presented to the ED on 6/23 with severe expressive aphasia, LKW 9:30 pm. NIHSS 10 upon arrival for severe aphasia/dysarthria as pt nonverbal, intermittently able to follow commands, baseline R facial droop, LOC questions. mRs 1. NI alert activated 14:02. CTH negative, CTA H&N showed focal occlusion in the M2/M3 junction of the superior division of the left MCA, new since the prior CTA from 1/20/2023. New multifocal severe stenosis in the left A3 FITO. Stable moderate stenosis in the proximal right M1 MCA. New diffuse moderate to severe irregular stenosis in the bilateral PCAs, left worse than right. CTP with 41 mL of delayed perfusion in the left frontal lobe correlating with the CTA finding. No tnk as Pt out of window. NI actual activated. During angiogram, no occlusion observed to perform thrombectomy on however diffuse ICAD vs. vasospasm was seen in the left ICA for which he received 10mg IA Verapamil with improvement in vessel narrowing.  Post-op NIH 9. Exam improved to NIH 5 w/ BP augmentation. Pt was admitted to NSICU for BP augmentation following stroke  While in the NCC, remained on IV levophed for hemodynamic augmentation, tapering down; NIHSS was stable 5-6. On 4/27 his aphasia worsened, stroke code was called. Urgent CTH obtained does not show any changes from the prior scan. Now downgraded to stroke unit on 4/29. Currently, Pt with NIHSS of 9 for LOC, right facial, Mute with global aphasia, can intermittently follow simple commands but not complex commands. Plan to continue DAPT for 90 days total and then Aspirin monotherapy.     During this hospital course, patient had Scattered multiple foci of acute ischemic infarcts in multiple vascular territories (right subinsular region, bilateral thalami, right basal ganglia, bilateral medial temporal lobes and left cerebellum). Etiology likely ICAD as seen on MRI.     Patient had the following workup done in house:  CT Head: No evidence of acute transcortical infarct, acute intracranial hemorrhage, or mass effect.  MR Head Non Contrast: Scattered multiple foci of acute infarcts in multiple vascular territories as described. No mass effect or midline shift. Multiple tiny foci showing signal dropout/blooming artifact on susceptibility weighted images at right subinsular region, bilateral thalami, right basal ganglia, bilateral medial temporal lobes and left cerebellum, probably representing microhemorrhages.  CT Angio Head and Neck: Focal occlusion in the M2/M3 junction of the superior division of the left MCA, new since the prior CTA from 1/20/2023. New multifocal severe stenosis in the left A3 FITO. Stable moderate stenosis in the proximal right M1 MCA. New diffuse moderate to severe irregular stenosis in the bilateral PCAs, left worse than right.  Stable severe left/moderate right atherosclerotic stenosis in the V4 vertebral arteries.    [x]echo   1. Normal global left ventricular systolic function.   2. LV Ejection Fraction by Garber's Method with a biplane EF of 69 %.   3. Spectral Doppler shows impaired relaxation pattern of left   ventricular myocardial filling (Grade I diastolic dysfunction).   4. Normal left atrial size.   5. Normal right atrial size.   6. Mild mitral annular calcification.   7. Mild thickening and calcification of the anterior and posterior   mitral valve leaflets.   8. Trace mitral valve regurgitation.   9. Mild tricuspid regurgitation.  10. Sclerotic aortic valve with decreased opening.    [x]labs A1c 5.6,  LDL 44, TSH 0.88    Physical exam at discharge:  General: No acute distress, awake and alert    Neurologic:  -Mental status: Awake, alert and can nod his head yes and no to certain questions. Pt is mute but follows simple commands such as "close your eyes", "lift up your hand and legs". Unclear if pt comprehends all questions asked, unable to follow complex commands.   -Cranial nerves:   II: Visual fields are full to confrontation.  III, IV, VI: Extraocular movements are intact without nystagmus. Pupils equally round and reactive to light  V:  Facial sensation V1-V3 equal and intact   VII: right lower facial droop  VIII: Hearing is bilaterally intact to finger rub  IX, X: Uvula is midline and soft palate rises symmetrically  XI: Head turning and shoulder shrug are intact.  XII: right tongue deviation (seen in previous admission)   Motor: Normal bulk and tone. Strength bilateral upper extremity 5/5, bilateral lower extremities 5-/5.  Sensation: Intact to light touch bilaterally (lifts up the legs that writer touched)  Coordination: No dysmetria on finger-to-nose bilaterally  Reflexes: Downgoing toes bilaterally   Gait: Deferred    NIHSS: 9 for LOC, right facial, Mute with severe expressive aphasia.     New medications on discharge: Midodrine 10 mg TID with parameters (hold for SBP >130)   Further outpatient workup: Stroke clinic in 2-4 weeks.    Hospital course:  Patient is a 73 y/o M with PMHx of parkinsons, HTN, DM, gout, seizures on keppra, priorstroke in January 2025 with residual expressive aphasia/dysarthria/R facial droop, who presented to the ED on 6/23 with severe expressive aphasia, LKW 9:30 pm. NIHSS 10 upon arrival for severe aphasia/dysarthria as pt nonverbal, intermittently able to follow commands, baseline R facial droop, LOC questions. mRs 1. NI alert activated 14:02. CTH negative, CTA H&N showed focal occlusion in the M2/M3 junction of the superior division of the left MCA, new since the prior CTA from 1/20/2023. New multifocal severe stenosis in the left A3 FITO. Stable moderate stenosis in the proximal right M1 MCA. New diffuse moderate to severe irregular stenosis in the bilateral PCAs, left worse than right. CTP with 41 mL of delayed perfusion in the left frontal lobe correlating with the CTA finding. No tnk as Pt out of window. NI actual activated. During angiogram, no occlusion observed to perform thrombectomy on however diffuse ICAD vs. vasospasm was seen in the left ICA for which he received 10mg IA Verapamil with improvement in vessel narrowing.  Post-op NIH 9. Exam improved to NIH 5 w/ BP augmentation. Pt was admitted to NSICU for BP augmentation following stroke  While in the NCC, remained on IV levophed for hemodynamic augmentation, tapering down; NIHSS was stable 5-6. On 4/27 his aphasia worsened, stroke code was called. Urgent CTH obtained does not show any changes from the prior scan. Now downgraded to stroke unit on 4/29. Currently, Pt with NIHSS of 9 for LOC, right facial, Mute with global aphasia, can intermittently follow simple commands but not complex commands. Plan to continue DAPT for 90 days total and then Aspirin monotherapy.     During this hospital course, patient had Scattered multiple foci of acute ischemic infarcts in multiple vascular territories (right subinsular region, bilateral thalami, right basal ganglia, bilateral medial temporal lobes and left cerebellum). Etiology likely ICAD as seen on MRI.     Patient had the following workup done in house:  CT Head: No evidence of acute transcortical infarct, acute intracranial hemorrhage, or mass effect.  MR Head Non Contrast: Scattered multiple foci of acute infarcts in multiple vascular territories as described. No mass effect or midline shift. Multiple tiny foci showing signal dropout/blooming artifact on susceptibility weighted images at right subinsular region, bilateral thalami, right basal ganglia, bilateral medial temporal lobes and left cerebellum, probably representing microhemorrhages.  CT Angio Head and Neck: Focal occlusion in the M2/M3 junction of the superior division of the left MCA, new since the prior CTA from 1/20/2023. New multifocal severe stenosis in the left A3 FITO. Stable moderate stenosis in the proximal right M1 MCA. New diffuse moderate to severe irregular stenosis in the bilateral PCAs, left worse than right.  Stable severe left/moderate right atherosclerotic stenosis in the V4 vertebral arteries.    [x]echo   1. Normal global left ventricular systolic function.   2. LV Ejection Fraction by Garber's Method with a biplane EF of 69 %.   3. Spectral Doppler shows impaired relaxation pattern of left   ventricular myocardial filling (Grade I diastolic dysfunction).   4. Normal left atrial size.   5. Normal right atrial size.   6. Mild mitral annular calcification.   7. Mild thickening and calcification of the anterior and posterior   mitral valve leaflets.   8. Trace mitral valve regurgitation.   9. Mild tricuspid regurgitation.  10. Sclerotic aortic valve with decreased opening.    [x]labs A1c 5.6,  LDL 44, TSH 0.88    Physical exam at discharge:  General: No acute distress, awake and alert    Neurologic:  -Mental status: Awake, alert and can nod his head yes and no to certain questions. Pt is mute but follows simple commands such as "close your eyes", "lift up your hand and legs". Unclear if pt comprehends all questions asked, unable to follow complex commands.   -Cranial nerves:   II: Visual fields are full to confrontation.  III, IV, VI: Extraocular movements are intact without nystagmus. Pupils equally round and reactive to light  V:  Facial sensation V1-V3 equal and intact   VII: right lower facial droop  VIII: Hearing is bilaterally intact to finger rub  IX, X: Uvula is midline and soft palate rises symmetrically  XI: Head turning and shoulder shrug are intact.  XII: right tongue deviation (seen in previous admission)   Motor: Normal bulk and tone. Strength bilateral upper extremity 5/5, bilateral lower extremities 5-/5.  Sensation: Intact to light touch bilaterally (lifts up the legs that writer touched)  Coordination: No dysmetria on finger-to-nose bilaterally  Reflexes: Downgoing toes bilaterally   Gait: Deferred    NIHSS: 9 for LOC, right facial, Mute with severe expressive aphasia.     New medications on discharge: Midodrine 10 mg TID with parameters (hold for SBP >160)   Further outpatient workup: Stroke clinic in 2-4 weeks.    Hospital course:  Patient is a 73 y/o M with PMHx of parkinsons, HTN, DM, gout, seizures on keppra, priorstroke in January 2025 with residual expressive aphasia/dysarthria/R facial droop, who presented to the ED on 6/23 with severe expressive aphasia, LKW 9:30 pm. NIHSS 10 upon arrival for severe aphasia/dysarthria as pt nonverbal, intermittently able to follow commands, baseline R facial droop, LOC questions. mRs 1. NI alert activated 14:02. CTH negative, CTA H&N showed focal occlusion in the M2/M3 junction of the superior division of the left MCA, new since the prior CTA from 1/20/2023. New multifocal severe stenosis in the left A3 FITO. Stable moderate stenosis in the proximal right M1 MCA. New diffuse moderate to severe irregular stenosis in the bilateral PCAs, left worse than right. CTP with 41 mL of delayed perfusion in the left frontal lobe correlating with the CTA finding. No tnk as Pt out of window. NI actual activated. During angiogram, no occlusion observed to perform thrombectomy on however diffuse ICAD vs. vasospasm was seen in the left ICA for which he received 10mg IA Verapamil with improvement in vessel narrowing.  Post-op NIH 9. Exam improved to NIH 5 w/ BP augmentation. Pt was admitted to NSICU for BP augmentation following stroke  While in the NCC, remained on IV levophed for hemodynamic augmentation, tapering down; NIHSS was stable 5-6. On 4/27 his aphasia worsened, stroke code was called. Urgent CTH obtained does not show any changes from the prior scan. Now downgraded to stroke unit on 4/29. Currently, Pt with NIHSS of 9 for LOC, right facial, Mute with global aphasia, can intermittently follow simple commands but not complex commands. Plan to continue DAPT for 90 days total and then Aspirin monotherapy.     During this hospital course, patient had Scattered multiple foci of acute ischemic infarcts in multiple vascular territories (right subinsular region, bilateral thalami, right basal ganglia, bilateral medial temporal lobes and left cerebellum). Etiology likely ICAD as seen on MRI.     Patient had the following workup done in house:  CT Head: No evidence of acute transcortical infarct, acute intracranial hemorrhage, or mass effect.  MR Head Non Contrast: Scattered multiple foci of acute infarcts in multiple vascular territories as described. No mass effect or midline shift. Multiple tiny foci showing signal dropout/blooming artifact on susceptibility weighted images at right subinsular region, bilateral thalami, right basal ganglia, bilateral medial temporal lobes and left cerebellum, probably representing microhemorrhages.  CT Angio Head and Neck: Focal occlusion in the M2/M3 junction of the superior division of the left MCA, new since the prior CTA from 1/20/2023. New multifocal severe stenosis in the left A3 FITO. Stable moderate stenosis in the proximal right M1 MCA. New diffuse moderate to severe irregular stenosis in the bilateral PCAs, left worse than right.  Stable severe left/moderate right atherosclerotic stenosis in the V4 vertebral arteries.    [x]echo   1. Normal global left ventricular systolic function.   2. LV Ejection Fraction by Garber's Method with a biplane EF of 69 %.   3. Spectral Doppler shows impaired relaxation pattern of left   ventricular myocardial filling (Grade I diastolic dysfunction).   4. Normal left atrial size.   5. Normal right atrial size.   6. Mild mitral annular calcification.   7. Mild thickening and calcification of the anterior and posterior   mitral valve leaflets.   8. Trace mitral valve regurgitation.   9. Mild tricuspid regurgitation.  10. Sclerotic aortic valve with decreased opening.    [x]labs A1c 5.6,  LDL 44, TSH 0.88    Physical exam at discharge:  General: No acute distress, awake and alert    Neurologic:  -Mental status: Awake, alert and can nod his head yes and no to certain questions. Pt is mute but follows simple commands such as "close your eyes", "lift up your hand and legs". Unclear if pt comprehends all questions asked, unable to follow complex commands.   -Cranial nerves:   II: Visual fields are full to confrontation.  III, IV, VI: Extraocular movements are intact without nystagmus. Pupils equally round and reactive to light  V:  Facial sensation V1-V3 equal and intact   VII: right lower facial droop  VIII: Hearing is bilaterally intact to finger rub  IX, X: Uvula is midline and soft palate rises symmetrically  XI: Head turning and shoulder shrug are intact.  XII: right tongue deviation (seen in previous admission)   Motor: Normal bulk and tone. Strength bilateral upper extremity 5/5, bilateral lower extremities 5-/5.  Sensation: Intact to light touch bilaterally (lifts up the legs that writer touched)  Coordination: No dysmetria on finger-to-nose bilaterally  Reflexes: Downgoing toes bilaterally   Gait: Deferred    NIHSS: 9 for LOC, right facial, Mute with severe expressive aphasia.     New medications on discharge: Midodrine 10 mg TID with parameters (hold for SBP >160)   Further outpatient workup: Stroke clinic in 2-4 weeks.     Attending Attestation: Patient seen and examined and agree with above except as noted.  Patients history, labs, imaging, vitals, meds and notes reviewed personally.  Plan as above

## 2025-04-30 NOTE — DISCHARGE NOTE PROVIDER - NSDCFUSCHEDAPPT_GEN_ALL_CORE_FT
[facility] Physician [facility]  CARDIOLOGY [address] [facility]  Scheduled Appointment: 05/28/2025

## 2025-04-30 NOTE — DISCHARGE NOTE PROVIDER - NSDCCPCAREPLAN_GEN_ALL_CORE_FT
PRINCIPAL DISCHARGE DIAGNOSIS  Diagnosis: Ischemic stroke  Assessment and Plan of Treatment: During this hospital admission, you had an ischemic stroke. During an ischemic stroke, blood stops flowing to part of your brain because of a blockage in the blood vessel. This can damage areas in the brain that control other parts of the body.  Please take your [aspirin and plavix] for blood thinning and Atorvastatin for cholesterol medication/blood vessel protection as prescribed to prevent further strokes. Do not skip doses and do not run low on your medication. If you run low on your medication, please contact your doctor.  You will follow up outpatient with the stroke clinic upon discharge.   Doing your regular tasks may be difficult after you've had a stroke, but you can learn new ways to manage your daily activities. In fact, doing daily activities may help you to regain muscle strength. Be patient, give yourself time to adjust, and appreciate the progress you make. For example, when showering or bathing, test the water temperature with a hand or foot that was not affected by the stroke, use grab bars, a shower seat, a hand-held showerhead, etc. It is normal to feel fatigue after a stroke, while some days may be worse than others, you will continue to improve.  Call 911 right away if you have any of the following symptoms of another stroke:  B: Balance: Sudden: Dizziness, loss of balance, or a sense of falling, difficulty with coordinating movement  E: Eyes: Sudden double vision or trouble seeing in one or both eyes  F: Face: Sudden uneven face  A: Arms (Legs): Sudden weakness, tingling, or loss of feeling on one side of your face or body  S: Speech: Sudden trouble talking or slurred speech, sudden difficulty understanding others  T: Time: Please call 911 right away and go to the emergency room  •Sudden, severe headache  •Blackouts or seizures

## 2025-04-30 NOTE — DISCHARGE NOTE PROVIDER - NSDCMRMEDTOKEN_GEN_ALL_CORE_FT
acetaminophen 325 mg oral tablet: 2 tab(s) orally every 6 hours as needed for Temp greater or equal to 38C (100.4F), Mild Pain (1 - 3)  amLODIPine 5 mg oral tablet: 1 tab(s) orally once a day  aspirin 81 mg oral delayed release tablet: 1 tab(s) orally once a day  atorvastatin 80 mg oral tablet: 1 tab(s) orally once a day (at bedtime)  carbidopa-levodopa 25 mg-100 mg oral tablet: 2 tab(s) orally 3 times a day  carbidopa-levodopa 50 mg-200 mg oral tablet, extended release: 1 tab(s) orally once a day (at bedtime)  clopidogrel 75 mg oral tablet: 1 tab(s) orally once a day  Coreg 3.125 mg oral tablet: 1 tab(s) orally every 12 hours  entacapone 200 mg oral tablet: 1 tab(s) orally 3 times a day at 7am, 12pm, and 5pm with Sinemet  levETIRAcetam 500 mg oral tablet: 1 tab(s) orally 2 times a day MDD: 2  Melatonin 3 mg oral tablet: 1 tab(s) orally once a day (at bedtime)  mirtazapine 7.5 mg oral tablet: 1 tab(s) orally once a day (at bedtime) MDD: 1  polyethylene glycol 3350 oral powder for reconstitution: 17 gram(s) orally once a day  rivastigmine 9.5 mg/24 hr transdermal film, extended release: 1 patch transdermally once a day  senna leaf extract oral tablet: 2 tab(s) orally once a day (at bedtime)  traZODone 50 mg oral tablet: 1 tab(s) orally once a day (at bedtime)   acetaminophen 325 mg oral tablet: 2 tab(s) orally every 6 hours as needed for Temp greater or equal to 38C (100.4F), Mild Pain (1 - 3)  aspirin 81 mg oral delayed release tablet: 1 tab(s) orally once a day  atorvastatin 80 mg oral tablet: 1 tab(s) orally once a day (at bedtime)  carbidopa-levodopa 25 mg-250 mg oral tablet: 2 tab(s) orally 3 times a day  carbidopa-levodopa 50 mg-200 mg oral tablet, extended release: 1 tab(s) orally once a day (at bedtime)  clopidogrel 75 mg oral tablet: 1 tab(s) orally once a day  entacapone 200 mg oral tablet: 1 tab(s) orally 3 times a day at 7am, 12pm, and 5pm with Sinemet  levETIRAcetam 500 mg oral tablet: 1 tab(s) orally 2 times a day  Melatonin 3 mg oral tablet: 1 tab(s) orally once a day (at bedtime)  midodrine 2.5 mg oral tablet: 3 tab(s) orally every 8 hours  mirtazapine 7.5 mg oral tablet: 1 tab(s) orally once a day (at bedtime) MDD: 1  polyethylene glycol 3350 oral powder for reconstitution: 17 gram(s) orally 2 times a day  rivastigmine 9.5 mg/24 hr transdermal film, extended release: 1 patch transdermal every 24 hours  senna leaf extract oral tablet: 2 tab(s) orally once a day (at bedtime)   acetaminophen 325 mg oral tablet: 2 tab(s) orally every 6 hours as needed for Temp greater or equal to 38C (100.4F), Mild Pain (1 - 3)  aspirin 81 mg oral delayed release tablet: 1 tab(s) orally once a day  atorvastatin 80 mg oral tablet: 1 tab(s) orally once a day (at bedtime)  carbidopa-levodopa 25 mg-250 mg oral tablet: 2 tab(s) orally 3 times a day  carbidopa-levodopa 50 mg-200 mg oral tablet, extended release: 1 tab(s) orally once a day (at bedtime)  clopidogrel 75 mg oral tablet: 1 tab(s) orally once a day  entacapone 200 mg oral tablet: 1 tab(s) orally 3 times a day at 7am, 12pm, and 5pm with Sinemet  levETIRAcetam 500 mg oral tablet: 1 tab(s) orally 2 times a day  Melatonin 3 mg oral tablet: 1 tab(s) orally once a day (at bedtime)  midodrine 10 mg oral tablet: 1 tab(s) orally every 8 hours Hold for SBP &gt;160  mirtazapine 7.5 mg oral tablet: 1 tab(s) orally once a day (at bedtime) MDD: 1  polyethylene glycol 3350 oral powder for reconstitution: 17 gram(s) orally 2 times a day  rivastigmine 9.5 mg/24 hr transdermal film, extended release: 1 patch transdermal every 24 hours  senna leaf extract oral tablet: 2 tab(s) orally once a day (at bedtime)

## 2025-04-30 NOTE — CONSULT NOTE ADULT - SUBJECTIVE AND OBJECTIVE BOX
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 Aphasia due to Scattered multiple foci of acute infarcts in multiple vascular territories (right subinsular region, bilateral thalami, right basal ganglia, bilateral medial temporal lobes and left cerebellum). Etiology c/f cardioembolic/ESUS vs ICAD. History of left corona radiata stroke in 1/2025  - NIHSS score is 9   - Admitted initially to NICU   - CTH:WNL   - CTA:   a) shows critical right M1-M2 stenosis. Stable focal moderate stenosis of the R V4 segment and L V4.  - CT perfusion:  a) CTP with 41 mL of delayed perfusion in the left frontal lobe correlating with the CTA finding  - Cerebral Angiogram: no occlusion noted but possible vasospasm. Reveived 10mg IA verapamil   - Was on levophed while in NICU   - Cont Aspirin and plavix. (Plavix PRU: 151)   - Cont lipitor 80mg HS   - ILR interrogated by EP: No AFib (ILR placed in 2025)     2. Hx of Seizure  EEG showed left hemispheric frontotemporally predominant focal cerebral dysfunction can be structural or functional in etiology.  - C/w home Keppra 500 mg bid    3. Hx of Parkinson  Home med: Sinemet 25/100 TID, Sinemet 50/200 qhs   - C/w 25/250 TID and 50/200 qhs     4. Chronic Thrombocytopenia-  Plts stable  - Continue to monitor     5. Misc:  - DVT Prophylaxis: Lovenox sq  - Diet: minced and moist  - Activity: IAT  - Dispo: Stroke Unit.   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     1. Aphasia due to Scattered multiple foci of acute infarcts in multiple vascular territories (right subinsular region, bilateral thalami, right basal ganglia, bilateral medial temporal lobes and left cerebellum). Etiology c/f cardioembolic/ESUS vs ICAD. History of left corona radiata stroke in 1/2025  - NIHSS score is 9   - Admitted initially to NICU now in the stroke unit   - CTH:WNL   - CTA:   a) shows critical right M1-M2 stenosis. Stable focal moderate stenosis of the R V4 segment and L V4.  - CT perfusion:  a) CTP with 41 mL of delayed perfusion in the left frontal lobe correlating with the CTA finding  - Cerebral Angiogram: no occlusion noted but possible vasospasm. Reveived 10mg IA verapamil   - Was on levophed while in NICU   - Cont Aspirin and plavix. (Plavix PRU: 151)   - Cont lipitor 80mg HS   - Neurocheck   - ILR interrogated by EP: No AFib (ILR placed in 2025)     2. Hx of Seizure  EEG showed left hemispheric frontotemporally predominant focal cerebral dysfunction can be structural or functional in etiology.  - C/w home Keppra 500 mg bid    3. Hx of Parkinson  Home med: Sinemet 25/100 TID, Sinemet 50/200 qhs   - C/w 25/250 TID and 50/200 qhs     4. Chronic Thrombocytopenia-  Plts stable  - Continue to monitor     5. Leukocytosis   - Observe. no signs of infection     6. Misc:  - DVT Prophylaxis: Lovenox sq  - Diet: minced and moist  - Activity: IAT  - Dispo: Stroke Unit.

## 2025-04-30 NOTE — DISCHARGE NOTE PROVIDER - DETAILS OF MALNUTRITION DIAGNOSIS/DIAGNOSES
This patient has been assessed with a concern for Malnutrition and was treated during this hospitalization for the following Nutrition diagnosis/diagnoses:     -  04/25/2025: Severe protein-calorie malnutrition

## 2025-04-30 NOTE — PROGRESS NOTE ADULT - ASSESSMENT
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 NIHSS 10 upon arrival for severe aphasia/dysarthria as pt nonverbal, intermittently able to follow commands, baseline R facial droop, LOC questions. mRs 1. NI alert activated 14:02. CTH negative, CTA H&N shows critical right M1-M2 stenosis. Stable focal moderate stenosis of the R V4 segment and L V4. CTP with 41 mL of delayed perfusion in the left frontal lobe correlating with the CTA finding. No tnk as Pt out of window. NI actual activated. During angiogram, no occlusion observed to perform thrombectomy on however diffuse ICAD vs. vasospasm was seen in the left ICA for which he received 10mg IA Verapamil with improvement in vessel narrowing.  Post-op NIH 9. Exam improved to NIH 5 w/ BP augmentation. Pt was admitted to NSICU for BP augmentation following stroke  While in the NCC, remained on IV levophed for hemodynamic augmentation, tapering down; NIHSS was stable 5-6. On 4/27: aphasia worsened, stroke code was called. Urgent CTH obtained does not show any changes from the prior scan. Now downgraded to stroke unit on 4/29. Currently, Pt with NIHSS of 9 for LOC, right facial, Mute with severe expressive aphasia, can intermittently follow simple commands but not complex commands.     Plan:   #Scattered multiple foci of acute infarcts in multiple vascular territories (right subinsular region, bilateral thalami, right basal ganglia, bilateral medial temporal lobes and left cerebellum). Etiology c/f cardioembolic/ESUS vs ICAD  History of left corona radiata stroke in 1/2025  - Home antiplatelets: Aspirin 81 mg and Plavix 75 mg   - PRU on Plavix: 151- 4/27/25   - C/w DAPT (Aspirin and Plavix)   - C/w lipitor 80mg qd  - C/w Midodrine 7.5 mg q8 hrs   - SBP goal 130-160   - Neurochecks q4 hrs  - ILR interrogated by EP: No AFib (ILR placed in 2025)   - TTE:  EF of 69 %., grade 1 diastolic dysfunction. Normal atria   - A1c 5.6,  LDL 44, TSH 0.88  - Tele monitoring  - PT/OT/Physiatry- acute inpatient rehab     #Hx of Seizure  EEG showed left hemispheric frontotemporally predominant focal cerebral dysfunction can be structural or functional in etiology.  - C/w home Keppra 500 mg bid    #Hx of Parkinson  Home med: Sinemet 25/100 TID, Sinemet 50/200 qhs   - C/w 25/250 TID and 50/200 qhs     #Chronic Thrombocytopenia-  Plts stable  - Continue to monitor     #Misc:  - DVT Prophylaxis: Lovenox sq  - Diet: minced and moist  - Activity: IAT  - Dispo: Stroke Unit.     Patient is a 73 y/o M with PMHx of parkinsons, HTN, DM, gout, seizures on keppra, priorstroke in January 2025 with residual expressive aphasia/dysarthria/R facial droop, who presented to the ED on 6/23 with severe expressive aphasia, LKW 9:30 pm. NIHSS 10 upon arrival for severe aphasia/dysarthria as pt nonverbal, intermittently able to follow commands, baseline R facial droop, LOC questions. mRs 1. NI alert activated 14:02. CTH negative, CTA H&N showed focal occlusion in the M2/M3 junction of the superior division of the left MCA, new since the prior CTA from 1/20/2023. New multifocal severe stenosis in the left A3 FITO. Stable moderate stenosis in the proximal right M1 MCA.  New diffuse moderate to severe irregular stenosis in the bilateral PCAs, left worse than right. CTP with 41 mL of delayed perfusion in the left frontal lobe correlating with the CTA finding. No tnk as Pt out of window. NI actual activated. During angiogram, no occlusion observed to perform thrombectomy on however diffuse ICAD vs. vasospasm was seen in the left ICA for which he received 10mg IA Verapamil with improvement in vessel narrowing.  Post-op NIH 9. Exam improved to NIH 5 w/ BP augmentation. Pt was admitted to NSICU for BP augmentation following stroke  While in the NCC, remained on IV levophed for hemodynamic augmentation, tapering down; NIHSS was stable 5-6. On 4/27: aphasia worsened, stroke code was called. Urgent CTH obtained does not show any changes from the prior scan. Now downgraded to stroke unit on 4/29. Currently, Pt with NIHSS of 9 for LOC, right facial, Mute with severe expressive aphasia, can intermittently follow simple commands but not complex commands.     Plan:   #Scattered multiple foci of acute infarcts in multiple vascular territories (right subinsular region, bilateral thalami, right basal ganglia, bilateral medial temporal lobes and left cerebellum). Etiology likely ICAD  History of left corona radiata stroke in 1/2025  - Home antiplatelets: Aspirin 81 mg and Plavix 75 mg   - PRU on Plavix: 151- 4/27/25   - C/w DAPT (Aspirin and Plavix) x 90 days, and then Aspirin monotherapy  - C/w lipitor 80mg qd  - C/w Midodrine 7.5 mg q8 hrs   - SBP goal 130-160   - Neurochecks q4 hrs  - ILR interrogated by EP: No AFib (ILR placed in 2025)   - TTE:  EF of 69 %., grade 1 diastolic dysfunction. Normal atria   - A1c 5.6,  LDL 44, TSH 0.88  - Tele monitoring  - PT/OT/Physiatry- acute inpatient rehab     #Hx of Seizure  EEG showed left hemispheric frontotemporally predominant focal cerebral dysfunction can be structural or functional in etiology.  - C/w home Keppra 500 mg bid    #Hx of Parkinson  Home med: Sinemet 25/100 TID, Sinemet 50/200 qhs   - C/w 25/250 TID and 50/200 qhs     #Chronic Thrombocytopenia-  Plts stable  - Continue to monitor     #Misc:  - DVT Prophylaxis: Lovenox sq  - Diet: minced and moist  - Activity: IAT  - Dispo: Stroke Unit.     Patient is a 75 y/o M with PMHx of parkinsons, HTN, DM, gout, seizures on keppra, priorstroke in January 2025 with residual expressive aphasia/dysarthria/R facial droop, who presented to the ED on 6/23 with severe expressive aphasia, LKW 9:30 pm. NIHSS 10 upon arrival for severe aphasia/dysarthria as pt nonverbal, intermittently able to follow commands, baseline R facial droop, LOC questions. mRs 1. NI alert activated 14:02. CTH negative, CTA H&N showed focal occlusion in the M2/M3 junction of the superior division of the left MCA, new since the prior CTA from 1/20/2023. New multifocal severe stenosis in the left A3 FITO. Stable moderate stenosis in the proximal right M1 MCA.  New diffuse moderate to severe irregular stenosis in the bilateral PCAs, left worse than right. CTP with 41 mL of delayed perfusion in the left frontal lobe correlating with the CTA finding. No tnk as Pt out of window. NI actual activated. During angiogram, no occlusion observed to perform thrombectomy on however diffuse ICAD vs. vasospasm was seen in the left ICA for which he received 10mg IA Verapamil with improvement in vessel narrowing.  Post-op NIH 9. Exam improved to NIH 5 w/ BP augmentation. Pt was admitted to NSICU for BP augmentation following stroke  While in the NCC, remained on IV levophed for hemodynamic augmentation, tapering down; NIHSS was stable 5-6. On 4/27: aphasia worsened, stroke code was called. Urgent CTH obtained does not show any changes from the prior scan. Now downgraded to stroke unit on 4/29. Currently, Pt with NIHSS of 9 for LOC, right facial, Mute with severe expressive aphasia, can intermittently follow simple commands but not complex commands.     Plan:   #Scattered multiple foci of acute infarcts in multiple vascular territories (right subinsular region, bilateral thalami, right basal ganglia, bilateral medial temporal lobes and left cerebellum). Etiology likely ICAD  #Expressive aphasia  History of left corona radiata stroke in 1/2025  CTA on arrival with left M2/M3 focal occlusion.   - Home antiplatelets: Aspirin 81 mg and Plavix 75 mg   - PRU on Plavix: 151- 4/27/25   - C/w DAPT (Aspirin and Plavix) x 90 days, and then Aspirin monotherapy  - C/w lipitor 80mg qd  - C/w Midodrine 7.5 mg q8 hrs   - SBP goal 130-160   - Neurochecks q4 hrs  - ILR interrogated by EP: No AFib (ILR placed in 2025)   - TTE:  EF of 69 %., grade 1 diastolic dysfunction. Normal atria   - A1c 5.6,  LDL 44, TSH 0.88  - Tele monitoring  - PT/OT/Physiatry- acute inpatient rehab     #Hx of Seizure  EEG showed left hemispheric frontotemporally predominant focal cerebral dysfunction can be structural or functional in etiology.  - C/w home Keppra 500 mg bid    #Hx of Parkinson  Home med: Sinemet 25/100 TID, Sinemet 50/200 qhs   - C/w 25/250 TID and 50/200 qhs     #Chronic Thrombocytopenia-  Plts stable  - Continue to monitor     #Misc:  - DVT Prophylaxis: Lovenox sq  - Diet: minced and moist  - Activity: IAT  - Dispo: Stroke Unit.     Patient is a 75 y/o M with PMHx of parkinsons, HTN, DM, gout, seizures on keppra, priorstroke in January 2025 with residual expressive aphasia/dysarthria/R facial droop, who presented to the ED on 6/23 with severe expressive aphasia, LKW 9:30 pm. NIHSS 10 upon arrival for severe aphasia/dysarthria as pt nonverbal, intermittently able to follow commands, baseline R facial droop, LOC questions. mRs 1. NI alert activated 14:02. CTH negative, CTA H&N showed focal occlusion in the M2/M3 junction of the superior division of the left MCA, new since the prior CTA from 1/20/2023. New multifocal severe stenosis in the left A3 FIOT. Stable moderate stenosis in the proximal right M1 MCA. New diffuse moderate to severe irregular stenosis in the bilateral PCAs, left worse than right. CTP with 41 mL of delayed perfusion in the left frontal lobe correlating with the CTA finding. No tnk as Pt out of window. NI actual activated. During angiogram, no occlusion observed to perform thrombectomy on however diffuse ICAD vs. vasospasm was seen in the left ICA for which he received 10mg IA Verapamil with improvement in vessel narrowing.  Post-op NIH 9. Exam improved to NIH 5 w/ BP augmentation. Pt was admitted to NSICU for BP augmentation following stroke  While in the NCC, remained on IV levophed for hemodynamic augmentation, tapering down; NIHSS was stable 5-6. On 4/27: aphasia worsened, stroke code was called. Urgent CTH obtained does not show any changes from the prior scan. Now downgraded to stroke unit on 4/29. Currently, Pt with NIHSS of 9 for LOC, right facial, Mute with severe expressive aphasia, can intermittently follow simple commands but not complex commands.     Plan:   #Scattered multiple foci of acute infarcts in multiple vascular territories (right subinsular region, bilateral thalami, right basal ganglia, bilateral medial temporal lobes and left cerebellum). Etiology likely ICAD  History of left corona radiata stroke in 1/2025  - CTA on arrival with left M2/M3 focal occlusion.   - Home antiplatelets: Aspirin 81 mg and Plavix 75 mg   - PRU on Plavix: 151- 4/27/25   - C/w DAPT (Aspirin and Plavix) x 90 days, and then Aspirin monotherapy  - C/w lipitor 80mg qd  - C/w Midodrine 7.5 mg q8 hrs   - SBP goal 130-160   - Neurochecks q4 hrs  - ILR interrogated by EP: No AFib (ILR placed in 2025)   - TTE:  EF of 69 %., grade 1 diastolic dysfunction. Normal atria   - A1c 5.6,  LDL 44, TSH 0.88  - Tele monitoring  - PT/OT/Physiatry- acute inpatient rehab     #Hx of Seizure  EEG showed left hemispheric frontotemporally predominant focal cerebral dysfunction can be structural or functional in etiology.  - C/w home Keppra 500 mg bid    #Hx of Parkinson  Home med: Sinemet 25/100 TID, Sinemet 50/200 qhs   - C/w 25/250 TID and 50/200 qhs     #Chronic Thrombocytopenia  Plts stable  - Continue to monitor     #Misc:  - DVT Prophylaxis: Lovenox sq  - Diet: minced and moist  - Activity: IAT  - Dispo: Stroke Unit.

## 2025-05-01 ENCOUNTER — INPATIENT (INPATIENT)
Facility: HOSPITAL | Age: 75
LOS: 12 days | Discharge: HOME | DRG: 57 | End: 2025-05-14
Attending: PHYSICAL MEDICINE & REHABILITATION | Admitting: PHYSICAL MEDICINE & REHABILITATION
Payer: MEDICARE

## 2025-05-01 ENCOUNTER — TRANSCRIPTION ENCOUNTER (OUTPATIENT)
Age: 75
End: 2025-05-01

## 2025-05-01 VITALS
SYSTOLIC BLOOD PRESSURE: 117 MMHG | DIASTOLIC BLOOD PRESSURE: 74 MMHG | OXYGEN SATURATION: 98 % | TEMPERATURE: 97 F | HEART RATE: 76 BPM | RESPIRATION RATE: 18 BRPM

## 2025-05-01 VITALS
TEMPERATURE: 98 F | HEIGHT: 65 IN | OXYGEN SATURATION: 98 % | RESPIRATION RATE: 18 BRPM | SYSTOLIC BLOOD PRESSURE: 152 MMHG | HEART RATE: 56 BPM | WEIGHT: 152.56 LBS | DIASTOLIC BLOOD PRESSURE: 89 MMHG

## 2025-05-01 DIAGNOSIS — Z90.49 ACQUIRED ABSENCE OF OTHER SPECIFIED PARTS OF DIGESTIVE TRACT: Chronic | ICD-10-CM

## 2025-05-01 LAB
ALBUMIN SERPL ELPH-MCNC: 3.5 G/DL — SIGNIFICANT CHANGE UP (ref 3.5–5.2)
ALP SERPL-CCNC: 102 U/L — SIGNIFICANT CHANGE UP (ref 30–115)
ALT FLD-CCNC: 10 U/L — SIGNIFICANT CHANGE UP (ref 0–41)
ANION GAP SERPL CALC-SCNC: 10 MMOL/L — SIGNIFICANT CHANGE UP (ref 7–14)
AST SERPL-CCNC: 40 U/L — SIGNIFICANT CHANGE UP (ref 0–41)
BASOPHILS # BLD AUTO: 0.03 K/UL — SIGNIFICANT CHANGE UP (ref 0–0.2)
BASOPHILS NFR BLD AUTO: 0.3 % — SIGNIFICANT CHANGE UP (ref 0–1)
BILIRUB SERPL-MCNC: 0.9 MG/DL — SIGNIFICANT CHANGE UP (ref 0.2–1.2)
BUN SERPL-MCNC: 12 MG/DL — SIGNIFICANT CHANGE UP (ref 10–20)
CALCIUM SERPL-MCNC: 8.9 MG/DL — SIGNIFICANT CHANGE UP (ref 8.4–10.5)
CHLORIDE SERPL-SCNC: 103 MMOL/L — SIGNIFICANT CHANGE UP (ref 98–110)
CO2 SERPL-SCNC: 25 MMOL/L — SIGNIFICANT CHANGE UP (ref 17–32)
CREAT SERPL-MCNC: 0.8 MG/DL — SIGNIFICANT CHANGE UP (ref 0.7–1.5)
EGFR: 94 ML/MIN/1.73M2 — SIGNIFICANT CHANGE UP
EGFR: 94 ML/MIN/1.73M2 — SIGNIFICANT CHANGE UP
EOSINOPHIL # BLD AUTO: 0.15 K/UL — SIGNIFICANT CHANGE UP (ref 0–0.7)
EOSINOPHIL NFR BLD AUTO: 1.7 % — SIGNIFICANT CHANGE UP (ref 0–8)
GLUCOSE SERPL-MCNC: 75 MG/DL — SIGNIFICANT CHANGE UP (ref 70–99)
HCT VFR BLD CALC: 40.5 % — LOW (ref 42–52)
HGB BLD-MCNC: 14.1 G/DL — SIGNIFICANT CHANGE UP (ref 14–18)
IMM GRANULOCYTES NFR BLD AUTO: 0.2 % — SIGNIFICANT CHANGE UP (ref 0.1–0.3)
LYMPHOCYTES # BLD AUTO: 2.1 K/UL — SIGNIFICANT CHANGE UP (ref 1.2–3.4)
LYMPHOCYTES # BLD AUTO: 24 % — SIGNIFICANT CHANGE UP (ref 20.5–51.1)
MAGNESIUM SERPL-MCNC: 2.3 MG/DL — SIGNIFICANT CHANGE UP (ref 1.8–2.4)
MCHC RBC-ENTMCNC: 33.7 PG — HIGH (ref 27–31)
MCHC RBC-ENTMCNC: 34.8 G/DL — SIGNIFICANT CHANGE UP (ref 32–37)
MCV RBC AUTO: 96.7 FL — HIGH (ref 80–94)
MONOCYTES # BLD AUTO: 0.75 K/UL — HIGH (ref 0.1–0.6)
MONOCYTES NFR BLD AUTO: 8.6 % — SIGNIFICANT CHANGE UP (ref 1.7–9.3)
NEUTROPHILS # BLD AUTO: 5.69 K/UL — SIGNIFICANT CHANGE UP (ref 1.4–6.5)
NEUTROPHILS NFR BLD AUTO: 65.2 % — SIGNIFICANT CHANGE UP (ref 42.2–75.2)
NRBC BLD AUTO-RTO: 0 /100 WBCS — SIGNIFICANT CHANGE UP (ref 0–0)
PHOSPHATE SERPL-MCNC: 3.4 MG/DL — SIGNIFICANT CHANGE UP (ref 2.1–4.9)
PLATELET # BLD AUTO: 150 K/UL — SIGNIFICANT CHANGE UP (ref 130–400)
PMV BLD: 9.8 FL — SIGNIFICANT CHANGE UP (ref 7.4–10.4)
POTASSIUM SERPL-MCNC: 4.2 MMOL/L — SIGNIFICANT CHANGE UP (ref 3.5–5)
POTASSIUM SERPL-SCNC: 4.2 MMOL/L — SIGNIFICANT CHANGE UP (ref 3.5–5)
PROT SERPL-MCNC: 5.8 G/DL — LOW (ref 6–8)
RBC # BLD: 4.19 M/UL — LOW (ref 4.7–6.1)
RBC # FLD: 12.6 % — SIGNIFICANT CHANGE UP (ref 11.5–14.5)
SODIUM SERPL-SCNC: 138 MMOL/L — SIGNIFICANT CHANGE UP (ref 135–146)
WBC # BLD: 8.74 K/UL — SIGNIFICANT CHANGE UP (ref 4.8–10.8)
WBC # FLD AUTO: 8.74 K/UL — SIGNIFICANT CHANGE UP (ref 4.8–10.8)

## 2025-05-01 PROCEDURE — 97530 THERAPEUTIC ACTIVITIES: CPT | Mod: GO

## 2025-05-01 PROCEDURE — 92526 ORAL FUNCTION THERAPY: CPT | Mod: GN

## 2025-05-01 PROCEDURE — 97129 THER IVNTJ 1ST 15 MIN: CPT | Mod: GO

## 2025-05-01 PROCEDURE — 93971 EXTREMITY STUDY: CPT | Mod: RT

## 2025-05-01 PROCEDURE — 92610 EVALUATE SWALLOWING FUNCTION: CPT | Mod: GN

## 2025-05-01 PROCEDURE — 36415 COLL VENOUS BLD VENIPUNCTURE: CPT

## 2025-05-01 PROCEDURE — 82962 GLUCOSE BLOOD TEST: CPT

## 2025-05-01 PROCEDURE — 97110 THERAPEUTIC EXERCISES: CPT | Mod: GP

## 2025-05-01 PROCEDURE — 97166 OT EVAL MOD COMPLEX 45 MIN: CPT | Mod: GO

## 2025-05-01 PROCEDURE — 99239 HOSP IP/OBS DSCHRG MGMT >30: CPT

## 2025-05-01 PROCEDURE — 99232 SBSQ HOSP IP/OBS MODERATE 35: CPT

## 2025-05-01 PROCEDURE — 97116 GAIT TRAINING THERAPY: CPT | Mod: GP

## 2025-05-01 PROCEDURE — 92611 MOTION FLUOROSCOPY/SWALLOW: CPT | Mod: GN

## 2025-05-01 PROCEDURE — 97162 PT EVAL MOD COMPLEX 30 MIN: CPT | Mod: GP

## 2025-05-01 PROCEDURE — 85025 COMPLETE CBC W/AUTO DIFF WBC: CPT

## 2025-05-01 PROCEDURE — 97112 NEUROMUSCULAR REEDUCATION: CPT | Mod: GP

## 2025-05-01 PROCEDURE — 92523 SPEECH SOUND LANG COMPREHEN: CPT | Mod: GN

## 2025-05-01 PROCEDURE — 97537 COMMUNITY/WORK REINTEGRATION: CPT | Mod: GO

## 2025-05-01 PROCEDURE — 83735 ASSAY OF MAGNESIUM: CPT

## 2025-05-01 PROCEDURE — 92507 TX SP LANG VOICE COMM INDIV: CPT | Mod: GN

## 2025-05-01 PROCEDURE — 97535 SELF CARE MNGMENT TRAINING: CPT | Mod: GO

## 2025-05-01 PROCEDURE — 80053 COMPREHEN METABOLIC PANEL: CPT

## 2025-05-01 PROCEDURE — 90791 PSYCH DIAGNOSTIC EVALUATION: CPT

## 2025-05-01 PROCEDURE — 74230 X-RAY XM SWLNG FUNCJ C+: CPT

## 2025-05-01 RX ORDER — ASPIRIN 325 MG
81 TABLET ORAL DAILY
Refills: 0 | Status: DISCONTINUED | OUTPATIENT
Start: 2025-05-01 | End: 2025-05-14

## 2025-05-01 RX ORDER — ATORVASTATIN CALCIUM 80 MG/1
80 TABLET, FILM COATED ORAL AT BEDTIME
Refills: 0 | Status: DISCONTINUED | OUTPATIENT
Start: 2025-05-01 | End: 2025-05-14

## 2025-05-01 RX ORDER — ENOXAPARIN SODIUM 100 MG/ML
40 INJECTION SUBCUTANEOUS
Refills: 0 | Status: DISCONTINUED | OUTPATIENT
Start: 2025-05-02 | End: 2025-05-14

## 2025-05-01 RX ORDER — POLYETHYLENE GLYCOL 3350 17 G/17G
17 POWDER, FOR SOLUTION ORAL
Qty: 0 | Refills: 0 | DISCHARGE
Start: 2025-05-01

## 2025-05-01 RX ORDER — ENTACAPONE 200 MG/1
200 TABLET, FILM COATED ORAL
Refills: 0 | Status: DISCONTINUED | OUTPATIENT
Start: 2025-05-02 | End: 2025-05-14

## 2025-05-01 RX ORDER — CARBIDOPA/LEVODOPA 25MG-100MG
2 TABLET ORAL
Qty: 0 | Refills: 0 | DISCHARGE
Start: 2025-05-01

## 2025-05-01 RX ORDER — MIDODRINE HYDROCHLORIDE 5 MG/1
10 TABLET ORAL EVERY 8 HOURS
Refills: 0 | Status: DISCONTINUED | OUTPATIENT
Start: 2025-05-01 | End: 2025-05-14

## 2025-05-01 RX ORDER — ATORVASTATIN CALCIUM 80 MG/1
1 TABLET, FILM COATED ORAL
Qty: 0 | Refills: 0 | DISCHARGE
Start: 2025-05-01

## 2025-05-01 RX ORDER — CLOPIDOGREL BISULFATE 75 MG/1
1 TABLET, FILM COATED ORAL
Qty: 0 | Refills: 0 | DISCHARGE
Start: 2025-05-01

## 2025-05-01 RX ORDER — SENNA 187 MG
2 TABLET ORAL AT BEDTIME
Refills: 0 | Status: DISCONTINUED | OUTPATIENT
Start: 2025-05-01 | End: 2025-05-14

## 2025-05-01 RX ORDER — POLYETHYLENE GLYCOL 3350 17 G/17G
17 POWDER, FOR SOLUTION ORAL
Refills: 0 | Status: DISCONTINUED | OUTPATIENT
Start: 2025-05-01 | End: 2025-05-14

## 2025-05-01 RX ORDER — ENTACAPONE 200 MG/1
200 TABLET, FILM COATED ORAL
Refills: 0 | Status: DISCONTINUED | OUTPATIENT
Start: 2025-05-01 | End: 2025-05-01

## 2025-05-01 RX ORDER — LEVETIRACETAM 10 MG/ML
500 INJECTION, SOLUTION INTRAVENOUS
Refills: 0 | Status: DISCONTINUED | OUTPATIENT
Start: 2025-05-01 | End: 2025-05-14

## 2025-05-01 RX ORDER — CLOPIDOGREL BISULFATE 75 MG/1
75 TABLET, FILM COATED ORAL DAILY
Refills: 0 | Status: DISCONTINUED | OUTPATIENT
Start: 2025-05-01 | End: 2025-05-01

## 2025-05-01 RX ORDER — CARBIDOPA/LEVODOPA 25MG-100MG
1 TABLET ORAL
Qty: 0 | Refills: 0 | DISCHARGE
Start: 2025-05-01

## 2025-05-01 RX ORDER — MIDODRINE HYDROCHLORIDE 5 MG/1
1 TABLET ORAL
Qty: 0 | Refills: 0 | DISCHARGE
Start: 2025-05-01

## 2025-05-01 RX ORDER — SENNA 187 MG
2 TABLET ORAL
Qty: 0 | Refills: 0 | DISCHARGE
Start: 2025-05-01

## 2025-05-01 RX ORDER — CARBIDOPA/LEVODOPA 25MG-100MG
2 TABLET ORAL
Refills: 0 | DISCHARGE

## 2025-05-01 RX ORDER — MELATONIN 5 MG
3 TABLET ORAL AT BEDTIME
Refills: 0 | Status: DISCONTINUED | OUTPATIENT
Start: 2025-05-01 | End: 2025-05-14

## 2025-05-01 RX ORDER — MIDODRINE HYDROCHLORIDE 5 MG/1
3 TABLET ORAL
Qty: 0 | Refills: 0 | DISCHARGE
Start: 2025-05-01

## 2025-05-01 RX ORDER — MIRTAZAPINE 30 MG/1
7.5 TABLET, FILM COATED ORAL AT BEDTIME
Refills: 0 | Status: DISCONTINUED | OUTPATIENT
Start: 2025-05-01 | End: 2025-05-14

## 2025-05-01 RX ORDER — CARBIDOPA/LEVODOPA 25MG-100MG
1 TABLET ORAL
Refills: 0 | Status: DISCONTINUED | OUTPATIENT
Start: 2025-05-01 | End: 2025-05-14

## 2025-05-01 RX ORDER — RIVASTIGMINE 13.3 MG/24H
1 PATCH, EXTENDED RELEASE TRANSDERMAL
Qty: 0 | Refills: 0 | DISCHARGE
Start: 2025-05-01

## 2025-05-01 RX ORDER — CLOPIDOGREL BISULFATE 75 MG/1
75 TABLET, FILM COATED ORAL DAILY
Refills: 0 | Status: DISCONTINUED | OUTPATIENT
Start: 2025-05-02 | End: 2025-05-14

## 2025-05-01 RX ORDER — CARBIDOPA/LEVODOPA 25MG-100MG
2 TABLET ORAL
Refills: 0 | Status: DISCONTINUED | OUTPATIENT
Start: 2025-05-01 | End: 2025-05-14

## 2025-05-01 RX ORDER — MIDODRINE HYDROCHLORIDE 5 MG/1
10 TABLET ORAL EVERY 8 HOURS
Refills: 0 | Status: DISCONTINUED | OUTPATIENT
Start: 2025-05-01 | End: 2025-05-01

## 2025-05-01 RX ORDER — ACETAMINOPHEN 500 MG/5ML
650 LIQUID (ML) ORAL EVERY 6 HOURS
Refills: 0 | Status: DISCONTINUED | OUTPATIENT
Start: 2025-05-01 | End: 2025-05-14

## 2025-05-01 RX ORDER — ENOXAPARIN SODIUM 100 MG/ML
40 INJECTION SUBCUTANEOUS EVERY 24 HOURS
Refills: 0 | Status: DISCONTINUED | OUTPATIENT
Start: 2025-05-01 | End: 2025-05-01

## 2025-05-01 RX ORDER — LEVETIRACETAM 10 MG/ML
1 INJECTION, SOLUTION INTRAVENOUS
Qty: 0 | Refills: 0 | DISCHARGE
Start: 2025-05-01

## 2025-05-01 RX ORDER — LEVETIRACETAM 10 MG/ML
500 INJECTION, SOLUTION INTRAVENOUS
Refills: 0 | Status: DISCONTINUED | OUTPATIENT
Start: 2025-05-01 | End: 2025-05-01

## 2025-05-01 RX ADMIN — LEVETIRACETAM 500 MILLIGRAM(S): 10 INJECTION, SOLUTION INTRAVENOUS at 06:43

## 2025-05-01 RX ADMIN — Medication 2 TABLET(S): at 18:41

## 2025-05-01 RX ADMIN — RIVASTIGMINE 1 PATCH: 13.3 PATCH, EXTENDED RELEASE TRANSDERMAL at 07:00

## 2025-05-01 RX ADMIN — ENTACAPONE 200 MILLIGRAM(S): 200 TABLET, FILM COATED ORAL at 06:44

## 2025-05-01 RX ADMIN — ENOXAPARIN SODIUM 40 MILLIGRAM(S): 100 INJECTION SUBCUTANEOUS at 13:24

## 2025-05-01 RX ADMIN — Medication 2 TABLET(S): at 16:58

## 2025-05-01 RX ADMIN — CLOPIDOGREL BISULFATE 75 MILLIGRAM(S): 75 TABLET, FILM COATED ORAL at 11:33

## 2025-05-01 RX ADMIN — Medication 1 APPLICATION(S): at 06:50

## 2025-05-01 RX ADMIN — POLYETHYLENE GLYCOL 3350 17 GRAM(S): 17 POWDER, FOR SOLUTION ORAL at 06:44

## 2025-05-01 RX ADMIN — Medication 3 MILLIGRAM(S): at 22:02

## 2025-05-01 RX ADMIN — RIVASTIGMINE 1 PATCH: 13.3 PATCH, EXTENDED RELEASE TRANSDERMAL at 13:00

## 2025-05-01 RX ADMIN — MIDODRINE HYDROCHLORIDE 10 MILLIGRAM(S): 5 TABLET ORAL at 21:59

## 2025-05-01 RX ADMIN — ENTACAPONE 200 MILLIGRAM(S): 200 TABLET, FILM COATED ORAL at 16:58

## 2025-05-01 RX ADMIN — Medication 2 TABLET(S): at 06:43

## 2025-05-01 RX ADMIN — ENTACAPONE 200 MILLIGRAM(S): 200 TABLET, FILM COATED ORAL at 18:41

## 2025-05-01 RX ADMIN — MIDODRINE HYDROCHLORIDE 10 MILLIGRAM(S): 5 TABLET ORAL at 13:24

## 2025-05-01 RX ADMIN — POLYETHYLENE GLYCOL 3350 17 GRAM(S): 17 POWDER, FOR SOLUTION ORAL at 18:48

## 2025-05-01 RX ADMIN — ENTACAPONE 200 MILLIGRAM(S): 200 TABLET, FILM COATED ORAL at 11:33

## 2025-05-01 RX ADMIN — LEVETIRACETAM 500 MILLIGRAM(S): 10 INJECTION, SOLUTION INTRAVENOUS at 17:04

## 2025-05-01 RX ADMIN — ATORVASTATIN CALCIUM 80 MILLIGRAM(S): 80 TABLET, FILM COATED ORAL at 22:00

## 2025-05-01 RX ADMIN — Medication 1 TABLET(S): at 22:00

## 2025-05-01 RX ADMIN — MIRTAZAPINE 7.5 MILLIGRAM(S): 30 TABLET, FILM COATED ORAL at 21:59

## 2025-05-01 RX ADMIN — MIDODRINE HYDROCHLORIDE 7.5 MILLIGRAM(S): 5 TABLET ORAL at 06:44

## 2025-05-01 RX ADMIN — RIVASTIGMINE 1 PATCH: 13.3 PATCH, EXTENDED RELEASE TRANSDERMAL at 13:24

## 2025-05-01 RX ADMIN — Medication 2 TABLET(S): at 11:33

## 2025-05-01 RX ADMIN — Medication 81 MILLIGRAM(S): at 11:33

## 2025-05-01 RX ADMIN — POLYETHYLENE GLYCOL 3350 17 GRAM(S): 17 POWDER, FOR SOLUTION ORAL at 17:04

## 2025-05-01 NOTE — H&P ADULT - REASON FOR ADMISSION
Rehab of bilateral  acute ischemic strokes  / aphasia, right facial droop, /  Parkinson's disease
The patient is a 57y Male complaining of knee pain/injury.

## 2025-05-01 NOTE — H&P ADULT - ATTENDING COMMENTS
20 I reviewed the chart and examined the patient with the resident and we discussed the findings and treatment plan.  The patient is tolerating the bedside rehab program well. I agree with the findings and treatment plan above, which I modified as indicated. The patient requires 3 hrs a day of acute inpatient rehab. Patient his history of strokes in the past x 3 and Parkinson's Syndrome, with residual aphasia, but able to speak and was ambulating with CG and RW when he left rehab in 2/25, admitted to [facility]  6/23 with severe expressive aphasia, CTA H&N showed focal occlusion in the M2/M3 junction of the superior division of the left MCA, new since the prior CTA from 1/20/2023. New multifocal severe stenosis in the left A3 FITO. Stable moderate stenosis in the proximal right M1 MCA. New diffuse moderate to severe irregular stenosis in the bilateral PCAs, left worse than right. CTP with 41 mL of delayed perfusion in the left frontal lobe correlating with the CTA finding. No tnk as Pt out of window. NI actual activated. During angiogram, no occlusion observed to perform thrombectomy on however diffuse ICAD vs. vasospasm was seen in the left ICA for which he received 10mg IA Verapamil with improvement in vessel narrowing. He was treated with vasopressors  and is on Midodrine. He is now medically stable, but still with severe aphasia and on a minced and moist diet for dysphagia. He was seen by bedside PT and OT and now requires modA bed mobility, maxA transfers, ambulates 5ft RW modA, then 35ft RW Reza, dressing with modA. The patient requires acute interdisciplinary rehab including PT and OT an ST to maximize function for safe d/c home in a reasonable time. The patient can tolerate and benefit from 3 hrs daily therapy and requires Physiatry f/u at least 3 days a week to manage his dysphagia and risk for aspiration and monitor his neuro status and vitals carefully given his pressor therapy and need to maintain higher BPs to maintain cerebral blood flow and to manage his DM2.    I read, edited and agree with the physical exam above and assessment:  #Rehab of scattered multiple foci of acute ischemic infarcts in multiple vascular territories (right subinsular region, bilateral thalami, right basal ganglia, bilateral medial temporal lobes and left cerebellum) causing worsened aphasia, right facial droop, gait abnormality and functional decline  #History of L bhatt radiate stroke in January 2025 with residual expressive aphasia/dysarthria/R facial droop.  - Etiology likely ICAD  - CTA on arrival with left M2/M3 focal occlusion. Stable focal moderate stenosis of the R V4 segment and L V4.  - CT perfusion: with 41 mL of delayed perfusion in the left frontal lobe correlating with the CTA finding  - Cerebral Angiogram: no occlusion noted but possible vasospasm. s/p 10mg IA verapamil   - ILR interrogated by EP: No AFib (ILR placed in 2025)   - TTE:  EF of 69 %., grade 1 diastolic dysfunction. Normal atria   - PRU on Plavix: 151- 4/27/25   - A1c 5.6,  LDL 44, TSH 0.88  - Home antiplatelets: Aspirin 81 mg and Plavix 75 mg   - C/w DAPT (Aspirin and Plavix) x 90 days, and then Aspirin monotherapy  - C/w lipitor 80mg qd  - increased Midodrine 10 mg q8 hrs on 5/1/25  - SBP goal 130-160   - PT/OT/SLP/neuropsych  -The patient requires acute rehab with 3 hours of daily therapies at least 5 out of 7 days and close physiatry follow up.     #Hx of Seizure  - EEG 4/26/25 showed left hemispheric frontotemporally predominant focal cerebral dysfunction can be structural or functional in etiology.  - C/w home Keppra 500 mg bid    #Hx of Parkinson  - Home med: Sinemet 25/100 TID, Sinemet 50/200 qhs   - C/w 25/250 TID and 50/200 qhs   - C/w rivastigmine 9.5mg patch qd  - C/w entacapone 200mg QID (7am/12am/5pm/9pm)    #Chronic Thrombocytopenia - resolved  - Plts stable  - Continue to monitor       #Misc  -Pain control:  tylenol  -GI/Bowel Mgmt: senna/miralax  -Skin: intact  -Diet: minced and moist, thins, CC

## 2025-05-01 NOTE — H&P ADULT - NSHPSOCIALHISTORY_GEN_ALL_CORE
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 Patient lives in Assisted Living Facility. Prior H&P notes deny tobacco or alcohol use.

## 2025-05-01 NOTE — H&P ADULT - TIME BILLING
chart review, comprehensive teaching history and physical  with resident, patient and family (phone call to HCP), counseling and education regarding disease risk factors and expected outcome and the inpatient rehab process, coordination with nursing, ACP and therapists and discussion with discharging team regarding handoff of the patient's condition and hostital course.

## 2025-05-01 NOTE — H&P ADULT - NSHPLABSRESULTS_GEN_ALL_CORE
Pt attempting to get off cart, pt c/o headache. Pt appears to be hyperventilating. Pt instructed on how to slow her breathing down and made comfortable on cart.     Pt now asking for tylenol but made aware she cannot take oral medication until she slows her 14.1   8.74  )-----------( 150      ( 01 May 2025 06:08 )             40.5     05-01    138  |  103  |  12  ----------------------------<  75  4.2   |  25  |  0.8    Ca    8.9      01 May 2025 06:08  Phos  3.4     05-01  Mg     2.3     05-01    TPro  5.8[L]  /  Alb  3.5  /  TBili  0.9  /  DBili  x   /  AST  40  /  ALT  10  /  AlkPhos  102  05-01      Urinalysis Basic - ( 01 May 2025 06:08 )    Color: x / Appearance: x / SG: x / pH: x  Gluc: 75 mg/dL / Ketone: x  / Bili: x / Urobili: x   Blood: x / Protein: x / Nitrite: x   Leuk Esterase: x / RBC: x / WBC x   Sq Epi: x / Non Sq Epi: x / Bacteria: x      POCT Blood Glucose.: 130 mg/dL (04-28-25 @ 06:15)  POCT Blood Glucose.: 174 mg/dL (04-27-25 @ 21:19)  POCT Blood Glucose.: 118 mg/dL (04-27-25 @ 15:57)  POCT Blood Glucose.: 133 mg/dL (04-27-25 @ 11:45)                  CT Head No Cont:   ACC: [id number] EXAM:  CT BRAIN   ORDERED BY: JOHN PELAYO     PROCEDURE DATE:  04/24/2025          INTERPRETATION:  CLINICAL INDICATION: Follow-up of acute infarct.    TECHNIQUE: Axial CT scanning of the brain was obtained from the skull   baseto the vertex without the administration of intravenous contrast.   Reformatted coronal and sagittal images were subsequently obtained and   reviewed.    COMPARISON: 4/23/2025    FINDINGS:  There is no CT evidence of acute transcortical infarct. Age-related   involutional changes and chronic microvascular ischemic changes. Chronic   infarcts in the left corona radiata and stable chronic lacunar infarcts   in bilateral thalami.    There is no hydrocephalus, mass effect, or acute intracranial hemorrhage.   No extra-axial collection. Basal cisterns are patent.    The visualized paranasal sinuses and mastoid air cells are clear.    The calvarium is intact.    IMPRESSION:  No evidence of acute transcortical infarct, acute intracranial   hemorrhage, or mass effect.    --- End of Report ---

## 2025-05-01 NOTE — H&P ADULT - HISTORY OF PRESENT ILLNESS
73 y/o M with PMHx of parkinsons, HTN, DM, gout, seizures on keppra, h/o ischemic CVA x3 last stroke in January 2025 with residual expressive aphasia/dysarthria/R facial droop, who presented to the ED on 6/23 with severe expressive aphasia, LKW 9:30 pm. NIHSS 10 upon arrival for severe aphasia/dysarthria as pt nonverbal, intermittently able to follow commands, baseline R facial droop, LOC questions. mRs 1. NI alert activated 14:02. CTH negative, CTA H&N showed focal occlusion in the M2/M3 junction of the superior division of the left MCA, new since the prior CTA from 1/20/2023. New multifocal severe stenosis in the left A3 FITO. Stable moderate stenosis in the proximal right M1 MCA. New diffuse moderate to severe irregular stenosis in the bilateral PCAs, left worse than right. CTP with 41 mL of delayed perfusion in the left frontal lobe correlating with the CTA finding. No tnk as Pt out of window. NI actual activated. During angiogram, no occlusion observed to perform thrombectomy on however diffuse ICAD vs. vasospasm was seen in the left ICA for which he received 10mg IA Verapamil with improvement in vessel narrowing.  Post-op NIH 9. Exam improved to NIH 5 w/ BP augmentation. Pt was admitted to NSICU for BP augmentation following stroke  While in the NCC, remained on IV levophed for hemodynamic augmentation, tapering down; NIHSS was stable 5-6. On 4/27: aphasia worsened, stroke code was called. Urgent CTH obtained does not show any changes from the prior scan. Now downgraded to stroke unit on 4/29. Currently, Pt with NIHSS of 9 for LOC, right facial, Mute with severe expressive aphasia, can intermittently follow simple commands but not complex commands. During this hospital course, patient had scattered multiple foci of acute ischemic infarcts in multiple vascular territories (right subinsular region, bilateral thalami, right basal ganglia, bilateral medial temporal lobes and left cerebellum). Etiology likely ICAD as seen on MRI.     Imaging:  CT Head: No evidence of acute transcortical infarct, acute intracranial hemorrhage, or mass effect.  MR Head Non Contrast: Scattered multiple foci of acute infarcts in multiple vascular territories as described. No mass effect or midline shift. Multiple tiny foci showing signal dropout/blooming artifact on susceptibility weighted images at right subinsular region, bilateral thalami, right basal ganglia, bilateral medial temporal lobes and left cerebellum, probably representing microhemorrhages.  CT Angio Head and Neck: Focal occlusion in the M2/M3 junction of the superior division of the left MCA, new since the prior CTA from 1/20/2023. New multifocal severe stenosis in the left A3 FITO. Stable moderate stenosis in the proximal right M1 MCA. New diffuse moderate to severe irregular stenosis in the bilateral PCAs, left worse than right. Stable severe left/moderate right atherosclerotic stenosis in the V4 vertebral arteries.    Prior to admission, the patient was independent in ADLs and ambulation without an assistive device living in an assited living facility. Patient now requires assistance with ambulation and ADLs due to acute infarcts in multiple vascular territories (right subinsular region, bilateral thalami, right basal ganglia, bilateral medial temporal lobes and left cerebellum). Therefore, there is a significant functional decline from baseline. Patients also with medical comorbidities of seizures, parkinsons, HTN, DM, requiring medication management and monitoring of vitals by Physiatry team and nursing. To return home it is in the patient’s best interest to have acute inpatient rehabilitative services to undergo intensive interdisciplinary therapy. Furthermore, the patient is motivated and is able to tolerate up to 3 hours of daily therapy for 5-6 days a week for a total of 15 hours a week. Evaluated by Physiatry and deemed to be an excellent candidate for admission to  for acute inpatient rehab. Admitted to Acute Rehab on 5/1/25.    Pt seen and examined by Physiatry and is currently functioning at modA bed mobility, maxA transfers, ambulates 5ft RW modA, then 35ft RW Reza, dressing with modA.     LS: lives in assisted living facility  PLOF: unable to confirm due to patient mutism, as of 2/2025 discharged from acute rehab previously functioning at bed mobility supervision; Transfers sup/touchA-partialA; ambulation 150 ft x2/RW/touchA; UBD partialA; LBD partialA 73 y/o M with PMHx of parkinsons, HTN, DM, gout, seizures on keppra, h/o ischemic CVA x3 last stroke in January 2025 with residual expressive aphasia/dysarthria/R facial droop, who presented to the ED on 6/23 with severe expressive aphasia, LKW 9:30 pm. NIHSS 10 upon arrival for severe aphasia/dysarthria as pt nonverbal, intermittently able to follow commands, baseline R facial droop, LOC questions. mRs 1. NI alert activated 14:02. CTH negative, CTA H&N showed focal occlusion in the M2/M3 junction of the superior division of the left MCA, new since the prior CTA from 1/20/2023. New multifocal severe stenosis in the left A3 FITO. Stable moderate stenosis in the proximal right M1 MCA. New diffuse moderate to severe irregular stenosis in the bilateral PCAs, left worse than right. CTP with 41 mL of delayed perfusion in the left frontal lobe correlating with the CTA finding. No tnk as Pt out of window. NI actual activated. During angiogram, no occlusion observed to perform thrombectomy on however diffuse ICAD vs. vasospasm was seen in the left ICA for which he received 10mg IA Verapamil with improvement in vessel narrowing.  Post-op NIH 9. Exam improved to NIH 5 w/ BP augmentation. Pt was admitted to NSICU for BP augmentation following stroke  While in the NCC, remained on IV levophed for hemodynamic augmentation, tapering down; NIHSS was stable 5-6. On 4/27: aphasia worsened, stroke code was called. Urgent CTH obtained does not show any changes from the prior scan. Now downgraded to stroke unit on 4/29. Currently, Pt with NIHSS of 9 for LOC, right facial, Mute with severe expressive aphasia, can intermittently follow simple commands but not complex commands. During this hospital course, patient had scattered multiple foci of acute ischemic infarcts in multiple vascular territories (right subinsular region, bilateral thalami, right basal ganglia, bilateral medial temporal lobes and left cerebellum). Etiology likely ICAD as seen on MRI.     Imaging:  CT Head: No evidence of acute transcortical infarct, acute intracranial hemorrhage, or mass effect.  MR Head Non Contrast: Scattered multiple foci of acute infarcts in multiple vascular territories as described. No mass effect or midline shift. Multiple tiny foci showing signal dropout/blooming artifact on susceptibility weighted images at right subinsular region, bilateral thalami, right basal ganglia, bilateral medial temporal lobes and left cerebellum, probably representing microhemorrhages.  CT Angio Head and Neck: Focal occlusion in the M2/M3 junction of the superior division of the left MCA, new since the prior CTA from 1/20/2023. New multifocal severe stenosis in the left A3 FITO. Stable moderate stenosis in the proximal right M1 MCA. New diffuse moderate to severe irregular stenosis in the bilateral PCAs, left worse than right. Stable severe left/moderate right atherosclerotic stenosis in the V4 vertebral arteries.    Prior to admission, the patient was independent in ADLs and ambulation without an assistive device living in an assited living facility. Patient now requires assistance with ambulation and ADLs due to acute infarcts in multiple vascular territories (right subinsular region, bilateral thalami, right basal ganglia, bilateral medial temporal lobes and left cerebellum). Therefore, there is a significant functional decline from baseline. Patients also with medical comorbidities of seizures, parkinsons, HTN, DM, requiring medication management and monitoring of vitals by Physiatry team and nursing. To return home it is in the patient’s best interest to have acute inpatient rehabilitative services to undergo intensive interdisciplinary therapy. Furthermore, the patient is motivated and is able to tolerate up to 3 hours of daily therapy for 5-6 days a week for a total of 15 hours a week. Evaluated by Physiatry and deemed to be an excellent candidate for admission to  for acute inpatient rehab. Admitted to Acute Rehab on 5/1/25.    Pt seen and examined by PT, OT, ST and Physiatry and is currently functioning at modA bed mobility, maxA transfers, ambulates 5ft RW modA, then 35ft RW Reza, dressing with modA. On minced and moist diet for dysphagia.    LS: lives in assisted living facility with private A  PLOF: unable to confirm due to patient aphasia, as of 2/2025 discharged from acute rehab previously functioning at bed mobility supervision; Transfers sup/touchA-partialA; ambulation 150 ft x2/RW/touchA; UBD partialA; LBD partialA

## 2025-05-01 NOTE — PROGRESS NOTE ADULT - NUTRITIONAL ASSESSMENT
This patient has been assessed with a concern for Malnutrition and has been determined to have a diagnosis/diagnoses of Severe protein-calorie malnutrition.    This patient is being managed with:   Diet Minced and Moist-  Consistent Carbohydrate {Evening Snack} (CSTCHOSN)  DASH/TLC {Sodium & Cholesterol Restricted} (DASH)  Supplement Feeding Modality:  Oral  Glucerna Shake Cans or Servings Per Day:  1       Frequency:  Two Times a day  Entered: Apr 28 2025  2:08PM  
This patient has been assessed with a concern for Malnutrition and has been determined to have a diagnosis/diagnoses of Severe protein-calorie malnutrition.    This patient is being managed with:   Diet Minced and Moist-  Consistent Carbohydrate {Evening Snack} (CSTCHOSN)  DASH/TLC {Sodium & Cholesterol Restricted} (DASH)  Supplement Feeding Modality:  Oral  Glucerna Shake Cans or Servings Per Day:  1       Frequency:  Two Times a day  Entered: Apr 28 2025  2:08PM  
This patient has been assessed with a concern for Malnutrition and has been determined to have a diagnosis/diagnoses of Severe protein-calorie malnutrition.    This patient is being managed with:   Diet Soft and Bite Sized-  Consistent Carbohydrate {Evening Snack} (CSTCHOSN)  DASH/TLC {Sodium & Cholesterol Restricted} (DASH)  Supplement Feeding Modality:  Oral  Glucerna Shake Cans or Servings Per Day:  1       Frequency:  Two Times a day  Entered: Apr 27 2025  4:12PM  
This patient has been assessed with a concern for Malnutrition and has been determined to have a diagnosis/diagnoses of Severe protein-calorie malnutrition.    This patient is being managed with:   Diet Minced and Moist-  Consistent Carbohydrate {Evening Snack} (CSTCHOSN)  DASH/TLC {Sodium & Cholesterol Restricted} (DASH)  Supplement Feeding Modality:  Oral  Glucerna Shake Cans or Servings Per Day:  1       Frequency:  Two Times a day  Entered: Apr 28 2025  2:08PM  
This patient has been assessed with a concern for Malnutrition and has been determined to have a diagnosis/diagnoses of Severe protein-calorie malnutrition.    This patient is being managed with:   Diet Minced and Moist-  Consistent Carbohydrate {Evening Snack} (CSTCHOSN)  DASH/TLC {Sodium & Cholesterol Restricted} (DASH)  Supplement Feeding Modality:  Oral  Glucerna Shake Cans or Servings Per Day:  1       Frequency:  Two Times a day  Entered: Apr 28 2025  2:08PM  
This patient has been assessed with a concern for Malnutrition and has been determined to have a diagnosis/diagnoses of Severe protein-calorie malnutrition.    This patient is being managed with:   Diet Soft and Bite Sized-  Consistent Carbohydrate {Evening Snack} (CSTCHOSN)  DASH/TLC {Sodium & Cholesterol Restricted} (DASH)  Supplement Feeding Modality:  Oral  Glucerna Shake Cans or Servings Per Day:  1       Frequency:  Two Times a day  Entered: Apr 25 2025 12:19PM

## 2025-05-01 NOTE — PROGRESS NOTE ADULT - SUBJECTIVE AND OBJECTIVE BOX
ZAKIA MILLS  74y  [facility] [address] [address]      Patient is a 74y old  Male who presents with a chief complaint of stroke (29 Apr 2025 16:20)      INTERVAL HPI/OVERNIGHT EVENTS:        REVIEW OF SYSTEMS:        FAMILY HISTORY:  FH: hypertension    FH: CAD (coronary artery disease)      T(C): 36.7 (05-01-25 @ 08:00), Max: 36.7 (05-01-25 @ 00:00)  HR: 76 (05-01-25 @ 08:00) (58 - 85)  BP: 136/68 (05-01-25 @ 08:00) (115/75 - 158/93)  RR: 17 (05-01-25 @ 08:00) (17 - 18)  SpO2: 98% (05-01-25 @ 08:00) (98% - 100%)  Wt(kg): --Vital Signs Last 24 Hrs  T(C): 36.7 (01 May 2025 08:00), Max: 36.7 (01 May 2025 00:00)  T(F): 98.1 (01 May 2025 08:00), Max: 98.1 (01 May 2025 00:00)  HR: 76 (01 May 2025 08:00) (58 - 85)  BP: 136/68 (01 May 2025 08:00) (115/75 - 158/93)  BP(mean): 89 (01 May 2025 08:00) (87 - 111)  RR: 17 (01 May 2025 08:00) (17 - 18)  SpO2: 98% (01 May 2025 08:00) (98% - 100%)    Parameters below as of 01 May 2025 08:00  Patient On (Oxygen Delivery Method): room air        PHYSICAL EXAM:  GENERAL: NAD, well-groomed, well-developed  HEAD:  Atraumatic, Normocephalic  EYES: EOMI, PERRLA, conjunctiva and sclera clear  ENMT: No tonsillar erythema, exudates, or enlargement; Moist mucous membranes, Good dentition, No lesions  NECK: Supple, No JVD, Normal thyroid  NERVOUS SYSTEM:  Alert & Oriented X3, Good concentration; Motor Strength 5/5 B/L upper and lower extremities; DTRs 2+ intact and symmetric  PULM: Clear to auscultation bilaterally  CARDIAC: Regular rate and rhythm; No murmurs, rubs, or gallops  GI: Soft, Nontender, Nondistended; Bowel sounds present  EXTREMITIES:  2+ Peripheral Pulses, No clubbing, cyanosis, or edema  LYMPH: No lymphadenopathy noted  SKIN: No rashes or lesions    Consultant(s) Notes Reviewed:  [x ] YES  [ ] NO  Care Discussed with Consultants/Other Providers [ x] YES  [ ] NO    LABS:                            14.1   8.74  )-----------( 150      ( 01 May 2025 06:08 )             40.5   05-01    138  |  103  |  12  ----------------------------<  75  4.2   |  25  |  0.8    Ca    8.9      01 May 2025 06:08  Phos  3.4     05-01  Mg     2.3     05-01    TPro  5.8[L]  /  Alb  3.5  /  TBili  0.9  /  DBili  x   /  AST  40  /  ALT  10  /  AlkPhos  102  05-01            acetaminophen     Tablet .. 650 milliGRAM(s) Oral every 6 hours PRN  aspirin  chewable 81 milliGRAM(s) Oral daily  atorvastatin 80 milliGRAM(s) Oral at bedtime  carbidopa/levodopa  25/250 2 Tablet(s) Oral <User Schedule>  carbidopa/levodopa CR 50/200 1 Tablet(s) Oral <User Schedule>  chlorhexidine 2% Cloths 1 Application(s) Topical <User Schedule>  clopidogrel Tablet 75 milliGRAM(s) Oral daily  enoxaparin Injectable 40 milliGRAM(s) SubCutaneous every 24 hours  entacapone 200 milliGRAM(s) Oral <User Schedule>  levETIRAcetam 500 milliGRAM(s) Oral two times a day  midodrine 7.5 milliGRAM(s) Oral every 8 hours  mirtazapine 7.5 milliGRAM(s) Oral at bedtime  polyethylene glycol 3350 17 Gram(s) Oral two times a day  rivastigmine patch  9.5 mG/24 Hr(s) 1 Patch Transdermal every 24 hours  senna 2 Tablet(s) Oral at bedtime  sodium chloride 0.9%. 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 Aphasia due to Scattered multiple foci of acute infarcts in multiple vascular territories (right subinsular region, bilateral thalami, right basal ganglia, bilateral medial temporal lobes and left cerebellum). Etiology c/f cardioembolic/ESUS vs ICAD. History of left corona radiata stroke in 1/2025  - NIHSS score is 9   - Admitted initially to NICU now in the stroke unit   - CTH:WNL   - CTA:   a) shows critical right M1-M2 stenosis. Stable focal moderate stenosis of the R V4 segment and L V4.  - CT perfusion:  a) CTP with 41 mL of delayed perfusion in the left frontal lobe correlating with the CTA finding  - Cerebral Angiogram: no occlusion noted but possible vasospasm. Reveived 10mg IA verapamil   - Was on levophed while in NICU   - Cont Aspirin and plavix. (Plavix PRU: 151)   - Cont lipitor 80mg HS   - Neurocheck   - ILR interrogated by EP: No AFib (ILR placed in 2025)     2. Hx of Seizure  EEG showed left hemispheric frontotemporally predominant focal cerebral dysfunction can be structural or functional in etiology.  - C/w home Keppra 500 mg bid    3. Hx of Parkinson  Home med: Sinemet 25/100 TID, Sinemet 50/200 qhs   - C/w 25/250 TID and 50/200 qhs     4. Chronic Thrombocytopenia-  Plts stable  - Continue to monitor     5. Leukocytosis  resolved     6. Misc:  - DVT Prophylaxis: Lovenox sq  - Diet: minced and moist  - Activity: IAT  - Dispo: Stroke Unit.     ZAKIA MILLS  74y  [facility]-[address] 3[address]      Patient is a 74y old  Male who presents with a chief complaint of stroke (29 Apr 2025 16:20)      INTERVAL HPI/OVERNIGHT EVENTS:    Patient is still with aphasia   no active complains noted  no overnight events         FAMILY HISTORY:  FH: hypertension    FH: CAD (coronary artery disease)      T(C): 36.7 (05-01-25 @ 08:00), Max: 36.7 (05-01-25 @ 00:00)  HR: 76 (05-01-25 @ 08:00) (58 - 85)  BP: 136/68 (05-01-25 @ 08:00) (115/75 - 158/93)  RR: 17 (05-01-25 @ 08:00) (17 - 18)  SpO2: 98% (05-01-25 @ 08:00) (98% - 100%)  Wt(kg): --Vital Signs Last 24 Hrs  T(C): 36.7 (01 May 2025 08:00), Max: 36.7 (01 May 2025 00:00)  T(F): 98.1 (01 May 2025 08:00), Max: 98.1 (01 May 2025 00:00)  HR: 76 (01 May 2025 08:00) (58 - 85)  BP: 136/68 (01 May 2025 08:00) (115/75 - 158/93)  BP(mean): 89 (01 May 2025 08:00) (87 - 111)  RR: 17 (01 May 2025 08:00) (17 - 18)  SpO2: 98% (01 May 2025 08:00) (98% - 100%)    Parameters below as of 01 May 2025 08:00  Patient On (Oxygen Delivery Method): room air        PHYSICAL EXAM:  GENERAL: Aphasic,   PULM: Clear to auscultation bilaterally  CARDIAC: Regular rate and rhythm;   GI: Soft, Nontender, Nondistended; Bowel sounds present  EXTREMITIES:  2+ Peripheral Pulses    Consultant(s) Notes Reviewed:  [x ] YES  [ ] NO  Care Discussed with Consultants/Other Providers [ x] YES  [ ] NO    LABS:                            14.1   8.74  )-----------( 150      ( 01 May 2025 06:08 )             40.5   05-01    138  |  103  |  12  ----------------------------<  75  4.2   |  25  |  0.8    Ca    8.9      01 May 2025 06:08  Phos  3.4     05-01  Mg     2.3     05-01    TPro  5.8[L]  /  Alb  3.5  /  TBili  0.9  /  DBili  x   /  AST  40  /  ALT  10  /  AlkPhos  102  05-01            acetaminophen     Tablet .. 650 milliGRAM(s) Oral every 6 hours PRN  aspirin  chewable 81 milliGRAM(s) Oral daily  atorvastatin 80 milliGRAM(s) Oral at bedtime  carbidopa/levodopa  25/250 2 Tablet(s) Oral <User Schedule>  carbidopa/levodopa CR 50/200 1 Tablet(s) Oral <User Schedule>  chlorhexidine 2% Cloths 1 Application(s) Topical <User Schedule>  clopidogrel Tablet 75 milliGRAM(s) Oral daily  enoxaparin Injectable 40 milliGRAM(s) SubCutaneous every 24 hours  entacapone 200 milliGRAM(s) Oral <User Schedule>  levETIRAcetam 500 milliGRAM(s) Oral two times a day  midodrine 7.5 milliGRAM(s) Oral every 8 hours  mirtazapine 7.5 milliGRAM(s) Oral at bedtime  polyethylene glycol 3350 17 Gram(s) Oral two times a day  rivastigmine patch  9.5 mG/24 Hr(s) 1 Patch Transdermal every 24 hours  senna 2 Tablet(s) Oral at bedtime  sodium chloride 0.9%. 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 Aphasia due to Scattered multiple foci of acute infarcts in multiple vascular territories (right subinsular region, bilateral thalami, right basal ganglia, bilateral medial temporal lobes and left cerebellum). Etiology c/f cardioembolic/ESUS vs ICAD. History of left corona radiata stroke in 1/2025  - NIHSS score is 9   - Admitted initially to NICU now in the stroke unit   - CTH:WNL   - CTA:   a) shows critical right M1-M2 stenosis. Stable focal moderate stenosis of the R V4 segment and L V4.  - CT perfusion:  a) CTP with 41 mL of delayed perfusion in the left frontal lobe correlating with the CTA finding  - Cerebral Angiogram: no occlusion noted but possible vasospasm. Reveived 10mg IA verapamil   - Was on levophed while in NICU   - Cont Aspirin and plavix. (Plavix PRU: 151)   - Cont lipitor 80mg HS   - Neurocheck   - ILR interrogated by EP: No AFib (ILR placed in 2025)     2. Hx of Seizure  EEG showed left hemispheric frontotemporally predominant focal cerebral dysfunction can be structural or functional in etiology.  - C/w home Keppra 500 mg bid    3. Hx of Parkinson  Home med: Sinemet 25/100 TID, Sinemet 50/200 qhs   - C/w 25/250 TID and 50/200 qhs     4. Chronic Thrombocytopenia-  Plts stable  - Continue to monitor     5. Leukocytosis  resolved     6. Pyuria but no symptoms and vital signs stable   - Observe     7. Misc:  - DVT Prophylaxis: Lovenox sq  - Diet: minced and moist  - Activity: IAT  - Dispo: Stroke Unit.

## 2025-05-01 NOTE — PATIENT PROFILE ADULT - FALL HARM RISK - HARM RISK INTERVENTIONS
Assistance with ambulation/Communicate Risk of Fall with Harm to all staff/Monitor for mental status changes/Monitor gait and stability/Reinforce activity limits and safety measures with patient and family/Reorient to person, place and time as needed/Use of alarms - bed, chair and/or voice tab/Visual Cue: Yellow wristband and red socks/Other:/Bed in lowest position, wheels locked, appropriate side rails in place/Call bell, personal items and telephone in reach/Instruct patient to call for assistance before getting out of bed or chair/Non-slip footwear when patient is out of bed/[place] to call system/Physically safe environment - no spills, clutter or unnecessary equipment/Purposeful Proactive Rounding/Room/bathroom lighting operational, light cord in reach

## 2025-05-01 NOTE — GOALS OF CARE CONVERSATION - ADVANCED CARE PLANNING - CONVERSATION DETAILS
Discussion of patient's values, preferences, and goals was conducted with patient's HCP-Shanon Aguial & writer over the phone.   Health Care Proxy: Shanon Aguila    Had discussion of Life-Sustaining Treatments such as CPR, Intubation and patient's goals related to life-sustaining treatments at length with HCP. Risk and benefits discussed. HCP states that she would like patient to be full code right now until they have further discussions together.

## 2025-05-01 NOTE — DISCHARGE NOTE NURSING/CASE MANAGEMENT/SOCIAL WORK - PATIENT PORTAL LINK FT
You can access the FollowMyHealth Patient Portal offered by [facility] by registering at the following website: [email or web address]. By joining [employer]’s FollowMyHealth portal, you will also be able to view your health information using other applications (apps) compatible with our system.

## 2025-05-01 NOTE — PROGRESS NOTE ADULT - ASSESSMENT
Patient is a 73 y/o M with PMHx of parkinsons, HTN, DM, gout, seizures on keppra, priorstroke in January 2025 with residual expressive aphasia/dysarthria/R facial droop, who presented to the ED on 6/23 with severe expressive aphasia, LKW 9:30 pm. NIHSS 10 upon arrival for severe aphasia/dysarthria as pt nonverbal, intermittently able to follow commands, baseline R facial droop, LOC questions. mRs 1. NI alert activated 14:02. CTH negative, CTA H&N showed focal occlusion in the M2/M3 junction of the superior division of the left MCA, new since the prior CTA from 1/20/2023. New multifocal severe stenosis in the left A3 FITO. Stable moderate stenosis in the proximal right M1 MCA. New diffuse moderate to severe irregular stenosis in the bilateral PCAs, left worse than right. CTP with 41 mL of delayed perfusion in the left frontal lobe correlating with the CTA finding. No tnk as Pt out of window. NI actual activated. During angiogram, no occlusion observed to perform thrombectomy on however diffuse ICAD vs. vasospasm was seen in the left ICA for which he received 10mg IA Verapamil with improvement in vessel narrowing.  Post-op NIH 9. Exam improved to NIH 5 w/ BP augmentation. Pt was admitted to NSICU for BP augmentation following stroke  While in the NCC, remained on IV levophed for hemodynamic augmentation, tapering down; NIHSS was stable 5-6. On 4/27: aphasia worsened, stroke code was called. Urgent CTH obtained does not show any changes from the prior scan. Now downgraded to stroke unit on 4/29. Currently, Pt with NIHSS of 9 for LOC, right facial, Mute with severe expressive aphasia, can intermittently follow simple commands but not complex commands.     Plan:   #Scattered multiple foci of acute infarcts in multiple vascular territories (right subinsular region, bilateral thalami, right basal ganglia, bilateral medial temporal lobes and left cerebellum). Etiology likely ICAD  History of left corona radiata stroke in 1/2025  - CTA on arrival with left M2/M3 focal occlusion.   - Home antiplatelets: Aspirin 81 mg and Plavix 75 mg   - PRU on Plavix: 151- 4/27/25   - C/w DAPT (Aspirin and Plavix) x 90 days, and then Aspirin monotherapy  - C/w lipitor 80mg qd  - C/w Midodrine 7.5 mg q8 hrs   - SBP goal 130-160   - Neurochecks q4 hrs  - ILR interrogated by EP: No AFib (ILR placed in 2025)   - TTE:  EF of 69 %., grade 1 diastolic dysfunction. Normal atria   - A1c 5.6,  LDL 44, TSH 0.88  - Tele monitoring  - PT/OT/Physiatry- acute inpatient rehab     #Hx of Seizure  EEG showed left hemispheric frontotemporally predominant focal cerebral dysfunction can be structural or functional in etiology.  - C/w home Keppra 500 mg bid    #Hx of Parkinson  Home med: Sinemet 25/100 TID, Sinemet 50/200 qhs   - C/w 25/250 TID and 50/200 qhs     #Chronic Thrombocytopenia  Plts stable  - Continue to monitor     #Misc:  - DVT Prophylaxis: Lovenox sq  - Diet: minced and moist  - Activity: IAT  - Dispo: Stroke Unit.       Patient is a 73 y/o M with PMHx of parkinsons, HTN, DM, gout, seizures on keppra, priorstroke in January 2025 with residual expressive aphasia/dysarthria/R facial droop, who presented to the ED on 6/23 with severe expressive aphasia, LKW 9:30 pm. NIHSS 10 upon arrival for severe aphasia/dysarthria as pt nonverbal, intermittently able to follow commands, baseline R facial droop, LOC questions. mRs 1. NI alert activated 14:02. CTH negative, CTA H&N showed focal occlusion in the M2/M3 junction of the superior division of the left MCA, new since the prior CTA from 1/20/2023. New multifocal severe stenosis in the left A3 FITO. Stable moderate stenosis in the proximal right M1 MCA. New diffuse moderate to severe irregular stenosis in the bilateral PCAs, left worse than right. CTP with 41 mL of delayed perfusion in the left frontal lobe correlating with the CTA finding. No tnk as Pt out of window. NI actual activated. During angiogram, no occlusion observed to perform thrombectomy on however diffuse ICAD vs. vasospasm was seen in the left ICA for which he received 10mg IA Verapamil with improvement in vessel narrowing.  Post-op NIH 9. Exam improved to NIH 5 w/ BP augmentation. Pt was admitted to NSICU for BP augmentation following stroke  While in the NCC, remained on IV levophed for hemodynamic augmentation, tapering down; NIHSS was stable 5-6. On 4/27: aphasia worsened, stroke code was called. Urgent CTH obtained does not show any changes from the prior scan. Now downgraded to stroke unit on 4/29. Currently, Pt with NIHSS of 9 for LOC, right facial, Mute with severe expressive aphasia, can intermittently follow simple commands but not complex commands.     Plan:   #Scattered multiple foci of acute infarcts in multiple vascular territories (right subinsular region, bilateral thalami, right basal ganglia, bilateral medial temporal lobes and left cerebellum). Etiology likely ICAD  History of left corona radiata stroke in 1/2025  - CTA on arrival with left M2/M3 focal occlusion.   - Home antiplatelets: Aspirin 81 mg and Plavix 75 mg   - PRU on Plavix: 151- 4/27/25   - C/w DAPT (Aspirin and Plavix) x 90 days, and then Aspirin monotherapy  - C/w lipitor 80mg qd  - increased Midodrine 10 mg q8 hrs   - SBP goal 130-160   - Neurochecks q4 hrs  - ILR interrogated by EP: No AFib (ILR placed in 2025)   - TTE:  EF of 69 %., grade 1 diastolic dysfunction. Normal atria   - A1c 5.6,  LDL 44, TSH 0.88  - Tele monitoring  - PT/OT/Physiatry- acute inpatient rehab     #Hx of Seizure  EEG showed left hemispheric frontotemporally predominant focal cerebral dysfunction can be structural or functional in etiology.  - C/w home Keppra 500 mg bid    #Hx of Parkinson  Home med: Sinemet 25/100 TID, Sinemet 50/200 qhs   - C/w 25/250 TID and 50/200 qhs     #Chronic Thrombocytopenia  Plts stable  - Continue to monitor     #Misc:  - DVT Prophylaxis: Lovenox sq  - Diet: minced and moist  - Activity: IAT  - Dispo: Stroke Unit.

## 2025-05-01 NOTE — H&P ADULT - NSHPPHYSICALEXAM_GEN_ALL_CORE
PHYSICAL EXAMINATION   VItals: T(C): 36.7 (05-01-25 @ 08:00), Max: 36.7 (05-01-25 @ 00:00)  HR: 76 (05-01-25 @ 08:00) (58 - 85)  BP: 136/68 (05-01-25 @ 08:00) (115/75 - 158/93)  RR: 17 (05-01-25 @ 08:00) (17 - 18)  SpO2: 98% (05-01-25 @ 08:00) (98% - 100%)    General:[ x  ] NAD, Resting Comfortable,   [   ] other:                                HEENT: [ x  ] NC/AT, EOMI, PERRL , Normal Conjunctiva,   [   ] other:  Cardio: [  x ] RRR, no murmur,   [   ] other:                              Pulm: [  x ] No Respiratory Distress,  Lungs CTAB,   [   ] other:                       Abdomen: [ x  ]ND/NT, Soft,   [   ] other:    : [ x  ] NO LAM CATHETER, [   ] LAM CATHETER- no meatal tear, no discharge, [   ] other:                                            MSK: [  x ] No joint swelling, Full ROM,   [   ] other:                                         Ext: [ x  ]No C/C/E, No calf tenderness,   [   ]other:    Skin: [ x  ]intact,   [   ] other:                                                                   Neurological Examination:  Cognitive: [    ] AAO x 3,   [  x  ]  other: awake, alert, can nod head but inconsistent with correct answer (Nodded yes if his name was "Shailesh") and follow simple commands less than 50% of time                                                                      Attention:  [ X   ] intact,   [    ]  other: can attend to interviewer on both right and left sides.                            Memory: [    ] intact,    [  X  ]  other: unable to assess 2/2 mutism     Mood/Affect: [  X  ] wnl,    [    ]  other:                                                                             Communication: [    ]Fluent, no dysarthria, following commands:  [  X  ] other: mute, expressive>receptive aphasia, follows simple commands less than 50% of time with frequent redirection   CN II - XII:  [     ] intact,  [  X  ] other: Right lower facial droop, remainder grossly intact visual fields appear intact eyes tracking to stimuli     Motor:   RIGHT UE: [ X  ] WNL,  [   ] other:   LEFT    UE: [ X  ] WNL,  [   ] other:  RIGHT LE: [ X  ] WNL,  [  X ] other: 5-/5 HF   LEFT    LE: [ X  ] WNL,  [  X ] other: 5-/5 HF    Tone: [  X  ] wnl,   [    ]  other:  DTRs: [    ]symmetric, [  x ] other: R patellar reflex brisk 3+  Coordination:   [  X  ] intact,   [  X  ] other: slow to initiate movement may be 2/2 mental status                                                                        Sensory: [  X  ] Intact to light touch, (moves extremity when asked to after touching limb)   [    ] other:      NIHSS on admission: 9 (LOC, right facial, Mute with severe expressive aphasia. PHYSICAL EXAMINATION   VItals: T(C): 36.7 (05-01-25 @ 08:00), Max: 36.7 (05-01-25 @ 00:00)  HR: 76 (05-01-25 @ 08:00) (58 - 85)  BP: 136/68 (05-01-25 @ 08:00) (115/75 - 158/93)  RR: 17 (05-01-25 @ 08:00) (17 - 18)  SpO2: 98% (05-01-25 @ 08:00) (98% - 100%)    General:[ x  ] NAD, Resting Comfortable,   [   ] other:                                HEENT: [ x  ] NC/AT, EOMI, PERRL , Normal Conjunctiva,   [   ] other:  Cardio: [  x ] RRR, no murmur,   [   ] other:                              Pulm: [  x ] No Respiratory Distress,  Lungs CTAB,   [   ] other:                       Abdomen: [ x  ]ND/NT, Soft,   [   ] other:    : [ x  ] NO LAM CATHETER, [   ] LAM CATHETER- no meatal tear, no discharge, [   ] other:                                            MSK: [  x ] No joint swelling, Full ROM,   [   ] other:                                         Ext: [ x  ]No C/C/E, No calf tenderness,   [   ]other:    Skin: [ x  ]intact,   [   ] other:                                                                   Neurological Examination:  Cognitive: [    ] AAO x 3,   [  x  ]  other: awake, alert, can nod head but inconsistent with correct answer (Nodded yes if his name was "Shailesh") and follow simple commands less than 50% of time                                                                      Attention:  [ X   ] intact,   [    ]  other: can attend to interviewer on both right and left sides.                            Memory: [    ] intact,    [  X  ]  other: unable to assess 2/2 aphasia   Mood/Affect: [  X  ] wnl,    [    ]  other:                                                                             Communication: [    ]Fluent, no dysarthria, following commands:  [  X  ] other: mute, expressive>receptive aphasia, follows simple commands less than 50% of time with frequent redirection   CN II - XII:  [     ] intact,  [  X  ] other: Right lower facial droop, remainder grossly intact visual fields appear intact eyes tracking to stimuli     Motor:   RIGHT UE: [ X  ] WNL,  [   ] other:   LEFT    UE: [ X  ] WNL,  [   ] other:  RIGHT LE: [ X  ] WNL,  [  X ] other: 5-/5 HF   LEFT    LE: [ X  ] WNL,  [  X ] other: 5-/5 HF    Tone: [  X  ] wnl,   [    ]  other:  DTRs: [    ]symmetric, [  x ] other: R patellar reflex brisk 3+  Coordination:   [  X  ] intact,   [  X  ] other: slow to initiate movement may be 2/2 mental status                                                                        Sensory: [  X  ] Intact to light touch, (moves extremity when asked to after touching limb)   [    ] other:      NIHSS on admission: 9 (LOC, right facial, Mute with severe expressive aphasia.

## 2025-05-01 NOTE — DISCHARGE NOTE NURSING/CASE MANAGEMENT/SOCIAL WORK - NSDCPEFALRISK_GEN_ALL_CORE
For information on Fall & Injury Prevention, visit: [email or web address] OR  [email or web address] OR  https://www.cdc.gov/steadi/patient.html

## 2025-05-01 NOTE — PATIENT PROFILE ADULT - PUBLIC BENEFITS
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

## 2025-05-01 NOTE — H&P ADULT - NSVTERISKREFERASSESS_GEN_ALL_CORE
Pt with R arm pain swelling. sts has lymphedema in arm. sts started having discoloration to all her fingers that started on Monday.    Refer to the Assessment tab to view/cancel completed assessment.

## 2025-05-01 NOTE — DISCHARGE NOTE NURSING/CASE MANAGEMENT/SOCIAL WORK - FINANCIAL ASSISTANCE
[facility] provides services at a reduced cost to those who are determined to be eligible through [facility]’s financial assistance program. Information regarding [facility]’s financial assistance program can be found by going to [email or web address] or by calling [phone].

## 2025-05-01 NOTE — PROGRESS NOTE ADULT - SUBJECTIVE AND OBJECTIVE BOX
Neurology Progress Note    INTERVAL HPI/OVERNIGHT EVENTS:  Patient seen and examined.     MEDICATIONS  (STANDING):  aspirin  chewable 81 milliGRAM(s) Oral daily  atorvastatin 80 milliGRAM(s) Oral at bedtime  carbidopa/levodopa  25/250 2 Tablet(s) Oral <User Schedule>  carbidopa/levodopa CR 50/200 1 Tablet(s) Oral <User Schedule>  chlorhexidine 2% Cloths 1 Application(s) Topical <User Schedule>  clopidogrel Tablet 75 milliGRAM(s) Oral daily  enoxaparin Injectable 40 milliGRAM(s) SubCutaneous every 24 hours  entacapone 200 milliGRAM(s) Oral <User Schedule>  levETIRAcetam 500 milliGRAM(s) Oral two times a day  midodrine 7.5 milliGRAM(s) Oral every 8 hours  mirtazapine 7.5 milliGRAM(s) Oral at bedtime  polyethylene glycol 3350 17 Gram(s) Oral two times a day  rivastigmine patch  9.5 mG/24 Hr(s) 1 Patch Transdermal every 24 hours  senna 2 Tablet(s) Oral at bedtime  sodium chloride 0.9%. 1000 milliLiter(s) (40 mL/Hr) IV Continuous <Continuous>    MEDICATIONS  (PRN):  acetaminophen     Tablet .. 650 milliGRAM(s) Oral every 6 hours PRN Temp greater or equal to 38C (100.4F), Mild Pain (1 - 3)      Allergies    penicillin (Unknown)  cefaclor (Unknown)  sulfa drugs (Unknown)    Intolerances        Vital Signs Last 24 Hrs  T(C): 36.7 (01 May 2025 08:00), Max: 36.7 (01 May 2025 00:00)  T(F): 98.1 (01 May 2025 08:00), Max: 98.1 (01 May 2025 00:00)  HR: 76 (01 May 2025 08:00) (58 - 85)  BP: 136/68 (01 May 2025 08:00) (115/75 - 158/93)  BP(mean): 89 (01 May 2025 08:00) (87 - 111)  RR: 17 (01 May 2025 08:00) (17 - 18)  SpO2: 98% (01 May 2025 08:00) (98% - 100%)    Parameters below as of 01 May 2025 08:00  Patient On (Oxygen Delivery Method): room air        Physical exam:  General: No acute distress, awake and alert    Neurologic:  -Mental status: Awake, alert and can nod his head yes and no to certain questions. Pt is mute but follows simple commands such as "close your eyes", "lift up your hand and legs". Unclear if pt comprehends all questions asked, unable to follow complex commands.   -Cranial nerves:   II: Visual fields are full to confrontation.  III, IV, VI: Extraocular movements are intact without nystagmus. Pupils equally round and reactive to light  V:  Facial sensation V1-V3 equal and intact   VII: right lower facial droop  VIII: Hearing is bilaterally intact to finger rub  IX, X: Uvula is midline and soft palate rises symmetrically  XI: Head turning and shoulder shrug are intact.  XII: right tongue deviation (seen in previous admission)   Motor: Normal bulk and tone. Strength bilateral upper extremity 5/5, bilateral lower extremities 5-/5.  Sensation: Intact to light touch bilaterally (lifts up the legs that writer touched)  Coordination: No dysmetria on finger-to-nose bilaterally  Reflexes: Downgoing toes bilaterally   Gait: Deferred    NIHSS: 9 for LOC, right facial, Mute-globally aphasic     LABS:                        14.1   8.74  )-----------( 150      ( 01 May 2025 06:08 )             40.5     05-01    138  |  103  |  12  ----------------------------<  75  4.2   |  25  |  0.8    Ca    8.9      01 May 2025 06:08  Phos  3.4     05-01  Mg     2.3     05-01    TPro  5.8[L]  /  Alb  3.5  /  TBili  0.9  /  DBili  x   /  AST  40  /  ALT  10  /  AlkPhos  102  05-01      Urinalysis Basic - ( 01 May 2025 06:08 )    Color: x / Appearance: x / SG: x / pH: x  Gluc: 75 mg/dL / Ketone: x  / Bili: x / Urobili: x   Blood: x / Protein: x / Nitrite: x   Leuk Esterase: x / RBC: x / WBC x   Sq Epi: x / Non Sq Epi: x / Bacteria: x        RADIOLOGY & ADDITIONAL TESTS:     Neurology Progress Note    INTERVAL HPI/OVERNIGHT EVENTS:  No overnight acute distress.   Patient seen and examined. Not in acute distress. Still not able to answer questions but shakes his head no when asked if he in any pain or discomfort.     MEDICATIONS  (STANDING):  aspirin  chewable 81 milliGRAM(s) Oral daily  atorvastatin 80 milliGRAM(s) Oral at bedtime  carbidopa/levodopa  25/250 2 Tablet(s) Oral <User Schedule>  carbidopa/levodopa CR 50/200 1 Tablet(s) Oral <User Schedule>  chlorhexidine 2% Cloths 1 Application(s) Topical <User Schedule>  clopidogrel Tablet 75 milliGRAM(s) Oral daily  enoxaparin Injectable 40 milliGRAM(s) SubCutaneous every 24 hours  entacapone 200 milliGRAM(s) Oral <User Schedule>  levETIRAcetam 500 milliGRAM(s) Oral two times a day  midodrine 7.5 milliGRAM(s) Oral every 8 hours  mirtazapine 7.5 milliGRAM(s) Oral at bedtime  polyethylene glycol 3350 17 Gram(s) Oral two times a day  rivastigmine patch  9.5 mG/24 Hr(s) 1 Patch Transdermal every 24 hours  senna 2 Tablet(s) Oral at bedtime  sodium chloride 0.9%. 1000 milliLiter(s) (40 mL/Hr) IV Continuous <Continuous>    MEDICATIONS  (PRN):  acetaminophen     Tablet .. 650 milliGRAM(s) Oral every 6 hours PRN Temp greater or equal to 38C (100.4F), Mild Pain (1 - 3)      Allergies    penicillin (Unknown)  cefaclor (Unknown)  sulfa drugs (Unknown)    Intolerances        Vital Signs Last 24 Hrs  T(C): 36.7 (01 May 2025 08:00), Max: 36.7 (01 May 2025 00:00)  T(F): 98.1 (01 May 2025 08:00), Max: 98.1 (01 May 2025 00:00)  HR: 76 (01 May 2025 08:00) (58 - 85)  BP: 136/68 (01 May 2025 08:00) (115/75 - 158/93)  BP(mean): 89 (01 May 2025 08:00) (87 - 111)  RR: 17 (01 May 2025 08:00) (17 - 18)  SpO2: 98% (01 May 2025 08:00) (98% - 100%)    Parameters below as of 01 May 2025 08:00  Patient On (Oxygen Delivery Method): room air        Physical exam:  General: No acute distress, awake and alert    Neurologic:  -Mental status: Awake, alert and can nod his head yes and no to certain questions. Pt is mute but follows simple commands such as "close your eyes", "lift up your hand and legs". Unclear if pt comprehends all questions asked, unable to follow complex commands.   -Cranial nerves:   II: Visual fields are full to confrontation.  III, IV, VI: Extraocular movements are intact without nystagmus. Pupils equally round and reactive to light  V:  Facial sensation V1-V3 equal and intact   VII: right lower facial droop  VIII: Hearing is bilaterally intact to finger rub  IX, X: Uvula is midline and soft palate rises symmetrically  XI: Head turning and shoulder shrug are intact.  XII: right tongue deviation (seen in previous admission)   Motor: Normal bulk and tone. Strength bilateral upper extremity 5/5, bilateral lower extremities 5-/5.  Sensation: Intact to light touch bilaterally (lifts up the legs that writer touched)  Coordination: No dysmetria on finger-to-nose bilaterally  Reflexes: Downgoing toes bilaterally   Gait: Deferred    NIHSS: 9 for LOC, right facial, Mute-globally aphasic     LABS:                        14.1   8.74  )-----------( 150      ( 01 May 2025 06:08 )             40.5     05-01    138  |  103  |  12  ----------------------------<  75  4.2   |  25  |  0.8    Ca    8.9      01 May 2025 06:08  Phos  3.4     05-01  Mg     2.3     05-01    TPro  5.8[L]  /  Alb  3.5  /  TBili  0.9  /  DBili  x   /  AST  40  /  ALT  10  /  AlkPhos  102  05-01      Urinalysis Basic - ( 01 May 2025 06:08 )    Color: x / Appearance: x / SG: x / pH: x  Gluc: 75 mg/dL / Ketone: x  / Bili: x / Urobili: x   Blood: x / Protein: x / Nitrite: x   Leuk Esterase: x / RBC: x / WBC x   Sq Epi: x / Non Sq Epi: x / Bacteria: x        RADIOLOGY & ADDITIONAL TESTS:

## 2025-05-01 NOTE — H&P ADULT - ASSESSMENT
ASSESSMENT/PLAN    Patient is a 73 y/o M with PMHx of parkinsons, HTN, DM, gout, seizures on keppra, prior ischemic stroke x3 most recently January 2025 with residual expressive aphasia/dysarthria/R facial droop, who presented to the ED on 6/23 with severe expressive aphasia, NIHSS 10 upon arrival for severe aphasia/dysarthria as pt nonverbal, intermittently able to follow commands, baseline R facial droop, LOC questions. mRs 1. NI alert activated, CTH negative, CTA H&N showed focal occlusion in the M2/M3 junction of the superior division of the left MCA, new since the prior CTA from 1/20/2023. New multifocal severe stenosis in the left A3 FITO. Stable moderate stenosis in the proximal right M1 MCA. New diffuse moderate to severe irregular stenosis in the bilateral PCAs, left worse than right. CTP with 41 mL of delayed perfusion in the left frontal lobe correlating with the CTA finding. No tnk as Pt out of window. NI activated, During angiogram, no occlusion observed to perform thrombectomy on however diffuse ICAD vs. vasospasm was seen in the left ICA for which he received 10mg IA Verapamil with improvement in vessel narrowing.  Post-op NIH 9. Exam improved to NIH 5 w/ BP augmentation. Pt was admitted to NSICU for BP augmentation following stroke  While in the NCC, remained on IV levophed for hemodynamic augmentation, tapering down; NIHSS was stable 5-6. On 4/27: aphasia worsened, stroke code was called. Urgent CTH obtained does not show any changes from the prior scan. Now downgraded to stroke unit on 4/29. Currently, Pt with NIHSS of 9 for LOC, right facial, Mute with severe expressive aphasia, can intermittently follow simple commands but not complex commands. Patient hemodynamically stable for admission to acute rehab on 5/1/25.     #Rehab of scattered multiple foci of acute ischemic infarcts in multiple vascular territories (right subinsular region, bilateral thalami, right basal ganglia, bilateral medial temporal lobes and left cerebellum) causing worsened aphasia, right facial droop, gait abnormality and functional decline  #History of L bhatt radiate stroke in January 2025 with residual expressive aphasia/dysarthria/R facial droop.  - Etiology likely ICAD  - CTA on arrival with left M2/M3 focal occlusion. Stable focal moderate stenosis of the R V4 segment and L V4.  - CT perfusion: with 41 mL of delayed perfusion in the left frontal lobe correlating with the CTA finding  - Cerebral Angiogram: no occlusion noted but possible vasospasm. s/p 10mg IA verapamil   - ILR interrogated by EP: No AFib (ILR placed in 2025)   - TTE:  EF of 69 %., grade 1 diastolic dysfunction. Normal atria   - PRU on Plavix: 151- 4/27/25   - A1c 5.6,  LDL 44, TSH 0.88  - Home antiplatelets: Aspirin 81 mg and Plavix 75 mg   - C/w DAPT (Aspirin and Plavix) x 90 days, and then Aspirin monotherapy  - C/w lipitor 80mg qd  - increased Midodrine 10 mg q8 hrs on 5/1/25  - SBP goal 130-160   - PT/OT/SLP/neuropsych  -The patient requires acute rehab with 3 hours of daily therapies at least 5 out of 7 days and close physiatry follow up.     #Hx of Seizure  - EEG 4/26/25 showed left hemispheric frontotemporally predominant focal cerebral dysfunction can be structural or functional in etiology.  - C/w home Keppra 500 mg bid    #Hx of Parkinson  - Home med: Sinemet 25/100 TID, Sinemet 50/200 qhs   - C/w 25/250 TID and 50/200 qhs   - C/w rivastigmine 9.5mg patch qd  - C/w entacapone 200mg QID (7am/12am/5pm/9pm)    #Chronic Thrombocytopenia - resolved  - Plts stable  - Continue to monitor       #Misc  -Pain control:  tylenol  -GI/Bowel Mgmt: senna/miralax  -Skin: intact  -Diet: minced and moist, thins, CC    Precautions / PROPHYLAXIS:    - Falls, aspiration  -DVT Prophylaxis: Chemical: lovenox 40mg QD  and Mechanical: SCDs      MEDICAL PROGNOSIS: GOOD            REHAB POTENTIAL: GOOD             ESTIMATED DISPOSITION: HOME WITH HOME CARE       [ x ]  The goals of the IRF admission were discussed with the patient and or family member, who agreed             ELOS:  [  X   ] 7-14    [    ]  14-21    [    ]  Other    THERAPY ORDERS and INITIAL INDIVIDUALIZED PLAN OF CARE:  This initial individualized interdisciplinary plan of care, which was established by me (the attending physiatrist), is based on elements from the post admission evaluation. The interdisciplinary therapy program is to be at least 3 hrs a day, at least 5 days per week from from physical, occupational and/ or speech therapies as ordered by me below.      [ x  ] P.T. 90 mins. /day at least 5 out of 7 days:  [  x ] superficial  modalities prn, [ x  ] A/AAROM, [ x  ] PREs, [ x  ] transfer training,            [ x  ] progressive ambulation, [x   ] stairs                                               [ x  ] O.T. 90 mins. /day at least 5 out of 7 days::  [ x  ] modalities prn,  [ x  ]A/AAROM, [ x  ] PREs, [  x ] functional transfer training, [ x  ] ADL training           [   ] cognitive/ perceptual eval and training, [   ] splint eval                                                  [ X  ] S.L.P:  [   ] speech eval, [ X  ] swallow eval     [ X  ] Neuropsychology eval     [ x  ] Individualized rec. therapy      RATIONALE FOR INPATIENT ADMISSION - Patient demonstrates the following: (check all that apply)  [X] Medically appropriate for acute rehabilitation admission. Requires interdisiplinary therapy consisting of at least PT and OT, at least 3 hrs. a day at least 5 days a week  [X] Has attainable rehab goals with an appropriate initial discharge plan  [X] Has rehabilitation potential (expected to make a significant improvement within a reasonable period of time)  [X] Requires close medical management by a rehab physician, rehab nursing care,  and comprehensive interdisciplinary team (including PT, OT)    [X] Requires evaluation by a physiatrist at least 3 days a week to evaluate and manage and coordinate rehab and medical problems   ASSESSMENT/PLAN    Patient is a 75 y/o M with PMHx of parkinsons, HTN, DM, gout, seizures on keppra, prior ischemic stroke x3 most recently January 2025 with residual expressive aphasia/dysarthria/R facial droop, who presented to the ED on 6/23 with severe expressive aphasia, NIHSS 10 upon arrival for severe aphasia/dysarthria as pt nonverbal, intermittently able to follow commands, baseline R facial droop, LOC questions. mRs 1. NI alert activated, CTH negative, CTA H&N showed focal occlusion in the M2/M3 junction of the superior division of the left MCA, new since the prior CTA from 1/20/2023. New multifocal severe stenosis in the left A3 FITO. Stable moderate stenosis in the proximal right M1 MCA. New diffuse moderate to severe irregular stenosis in the bilateral PCAs, left worse than right. CTP with 41 mL of delayed perfusion in the left frontal lobe correlating with the CTA finding. No tnk as Pt out of window. NI activated, During angiogram, no occlusion observed to perform thrombectomy on however diffuse ICAD vs. vasospasm was seen in the left ICA for which he received 10mg IA Verapamil with improvement in vessel narrowing.  Post-op NIH 9. Exam improved to NIH 5 w/ BP augmentation. Pt was admitted to NSICU for BP augmentation following stroke  While in the NCC, remained on IV levophed for hemodynamic augmentation, tapering down; NIHSS was stable 5-6. On 4/27: aphasia worsened, stroke code was called. Urgent CTH obtained does not show any changes from the prior scan. Now downgraded to stroke unit on 4/29. Currently, Pt with NIHSS of 9 for LOC, right facial, Mute with severe expressive aphasia, can intermittently follow simple commands but not complex commands. Patient hemodynamically stable for admission to acute rehab on 5/1/25.     #Rehab of scattered multiple foci of acute ischemic infarcts in multiple vascular territories (right subinsular region, bilateral thalami, right basal ganglia, bilateral medial temporal lobes and left cerebellum) causing worsened aphasia, right facial droop, gait abnormality and functional decline  #History of L bhatt radiate stroke in January 2025 with residual expressive aphasia/dysarthria/R facial droop.  - Etiology likely ICAD  - CTA on arrival with left M2/M3 focal occlusion. Stable focal moderate stenosis of the R V4 segment and L V4.  - CT perfusion: with 41 mL of delayed perfusion in the left frontal lobe correlating with the CTA finding  - Cerebral Angiogram: no occlusion noted but possible vasospasm. s/p 10mg IA verapamil   - ILR interrogated by EP: No AFib (ILR placed in 2025)   - TTE:  EF of 69 %., grade 1 diastolic dysfunction. Normal atria   - PRU on Plavix: 151- 4/27/25   - A1c 5.6,  LDL 44, TSH 0.88  - Home antiplatelets: Aspirin 81 mg and Plavix 75 mg   - C/w DAPT (Aspirin and Plavix) x 90 days, and then Aspirin monotherapy  - C/w lipitor 80mg qd  - increased Midodrine 10 mg q8 hrs on 5/1/25  - SBP goal 130-160   - PT/OT/SLP/neuropsych  -The patient requires acute rehab with 3 hours of daily therapies at least 5 out of 7 days and close physiatry follow up.     #Hx of Seizure  - EEG 4/26/25 showed left hemispheric frontotemporally predominant focal cerebral dysfunction can be structural or functional in etiology.  - C/w home Keppra 500 mg bid    #Hx of Parkinson  - Home med: Sinemet 25/100 TID, Sinemet 50/200 qhs   - C/w 25/250 TID and 50/200 qhs   - C/w rivastigmine 9.5mg patch qd  - C/w entacapone 200mg QID (7am/12am/5pm/9pm)    #Chronic Thrombocytopenia - resolved  - Plts stable  - Continue to monitor       #Misc  -Pain control:  tylenol  -GI/Bowel Mgmt: senna/miralax  -Skin: intact  -Diet: minced and moist, thins, CC    Precautions / PROPHYLAXIS:    - Falls, aspiration  -DVT Prophylaxis: Chemical: lovenox 40mg QD  and Mechanical: SCDs      MEDICAL PROGNOSIS: GOOD            REHAB POTENTIAL: GOOD for ambulation CG/ supervision with device, ADLs CG/ CS            ESTIMATED DISPOSITION: HOME WITH HOME CARE       [ x ]  The goals of the IRF admission were discussed with the patient and or family member, who agreed             ELOS:  [  X   ] 7-14    [    ]  14-21    [    ]  Other    THERAPY ORDERS and INITIAL INDIVIDUALIZED PLAN OF CARE:  This initial individualized interdisciplinary plan of care, which was established by me (the attending physiatrist), is based on elements from the post admission evaluation. The interdisciplinary therapy program is to be at least 3 hrs a day, at least 5 days per week from from physical, occupational and/ or speech therapies as ordered by me below.      [ x  ] P.T. 90 mins. /day at least 5 out of 7 days:  [  x ] superficial  modalities prn, [ x  ] A/AAROM, [ x  ] PREs, [ x  ] transfer training,            [ x  ] progressive ambulation, [x   ] stairs                                               [ x  ] O.T. 90 mins. /day at least 5 out of 7 days::  [ x  ] modalities prn,  [ x  ]A/AAROM, [ x  ] PREs, [  x ] functional transfer training, [ x  ] ADL training           [ x  ] cognitive/ perceptual eval and training, [   ] splint eval                                                  [ X  ] S.L.P:  [   ] speech eval, [ X  ] swallow eval     [ X  ] Neuropsychology eval     [ x  ] Individualized rec. therapy      RATIONALE FOR INPATIENT ADMISSION - Patient demonstrates the following: (check all that apply)  [X] Medically appropriate for acute rehabilitation admission. Requires interdisiplinary therapy consisting of at least PT and OT, at least 3 hrs. a day at least 5 days a week  [X] Has attainable rehab goals with an appropriate initial discharge plan  [X] Has rehabilitation potential (expected to make a significant improvement within a reasonable period of time)  [X] Requires close medical management by a rehab physician, rehab nursing care,  and comprehensive interdisciplinary team (including PT, OT)    [X] Requires evaluation by a physiatrist at least 3 days a week to evaluate and manage and coordinate rehab and medical problems

## 2025-05-01 NOTE — DISCHARGE NOTE NURSING/CASE MANAGEMENT/SOCIAL WORK - NSDCVIVACCINE_GEN_ALL_CORE_FT
Tdap; 12-May-2020 22:10; Angelina Gomez (RN); [employer]; [id number] (Exp. Date: 19-Apr-2021); IntraMuscular; Deltoid Right.; 0.5 milliLiter(s); VIS (VIS Published: 09-May-2013, VIS Presented: 12-May-2020);

## 2025-05-02 LAB
ALBUMIN SERPL ELPH-MCNC: 3.5 G/DL — SIGNIFICANT CHANGE UP (ref 3.5–5.2)
ALP SERPL-CCNC: 99 U/L — SIGNIFICANT CHANGE UP (ref 30–115)
ALT FLD-CCNC: 8 U/L — SIGNIFICANT CHANGE UP (ref 0–41)
ANION GAP SERPL CALC-SCNC: 8 MMOL/L — SIGNIFICANT CHANGE UP (ref 7–14)
AST SERPL-CCNC: 34 U/L — SIGNIFICANT CHANGE UP (ref 0–41)
BASOPHILS # BLD AUTO: 0.02 K/UL — SIGNIFICANT CHANGE UP (ref 0–0.2)
BASOPHILS NFR BLD AUTO: 0.2 % — SIGNIFICANT CHANGE UP (ref 0–1)
BILIRUB SERPL-MCNC: 1.1 MG/DL — SIGNIFICANT CHANGE UP (ref 0.2–1.2)
BUN SERPL-MCNC: 12 MG/DL — SIGNIFICANT CHANGE UP (ref 10–20)
CALCIUM SERPL-MCNC: 8.6 MG/DL — SIGNIFICANT CHANGE UP (ref 8.4–10.5)
CHLORIDE SERPL-SCNC: 103 MMOL/L — SIGNIFICANT CHANGE UP (ref 98–110)
CO2 SERPL-SCNC: 27 MMOL/L — SIGNIFICANT CHANGE UP (ref 17–32)
CREAT SERPL-MCNC: 0.9 MG/DL — SIGNIFICANT CHANGE UP (ref 0.7–1.5)
EGFR: 91 ML/MIN/1.73M2 — SIGNIFICANT CHANGE UP
EGFR: 91 ML/MIN/1.73M2 — SIGNIFICANT CHANGE UP
EOSINOPHIL # BLD AUTO: 0.15 K/UL — SIGNIFICANT CHANGE UP (ref 0–0.7)
EOSINOPHIL NFR BLD AUTO: 1.4 % — SIGNIFICANT CHANGE UP (ref 0–8)
GLUCOSE BLDC GLUCOMTR-MCNC: 150 MG/DL — HIGH (ref 70–99)
GLUCOSE BLDC GLUCOMTR-MCNC: 153 MG/DL — HIGH (ref 70–99)
GLUCOSE SERPL-MCNC: 85 MG/DL — SIGNIFICANT CHANGE UP (ref 70–99)
HCT VFR BLD CALC: 40.3 % — LOW (ref 42–52)
HGB BLD-MCNC: 13.9 G/DL — LOW (ref 14–18)
IMM GRANULOCYTES NFR BLD AUTO: 0.4 % — HIGH (ref 0.1–0.3)
LYMPHOCYTES # BLD AUTO: 1.98 K/UL — SIGNIFICANT CHANGE UP (ref 1.2–3.4)
LYMPHOCYTES # BLD AUTO: 18 % — LOW (ref 20.5–51.1)
MAGNESIUM SERPL-MCNC: 2 MG/DL — SIGNIFICANT CHANGE UP (ref 1.8–2.4)
MCHC RBC-ENTMCNC: 34.1 PG — HIGH (ref 27–31)
MCHC RBC-ENTMCNC: 34.5 G/DL — SIGNIFICANT CHANGE UP (ref 32–37)
MCV RBC AUTO: 98.8 FL — HIGH (ref 80–94)
MONOCYTES # BLD AUTO: 0.86 K/UL — HIGH (ref 0.1–0.6)
MONOCYTES NFR BLD AUTO: 7.8 % — SIGNIFICANT CHANGE UP (ref 1.7–9.3)
NEUTROPHILS # BLD AUTO: 7.96 K/UL — HIGH (ref 1.4–6.5)
NEUTROPHILS NFR BLD AUTO: 72.2 % — SIGNIFICANT CHANGE UP (ref 42.2–75.2)
NRBC BLD AUTO-RTO: 0 /100 WBCS — SIGNIFICANT CHANGE UP (ref 0–0)
PLATELET # BLD AUTO: 145 K/UL — SIGNIFICANT CHANGE UP (ref 130–400)
PMV BLD: 9.5 FL — SIGNIFICANT CHANGE UP (ref 7.4–10.4)
POTASSIUM SERPL-MCNC: 4.5 MMOL/L — SIGNIFICANT CHANGE UP (ref 3.5–5)
POTASSIUM SERPL-SCNC: 4.5 MMOL/L — SIGNIFICANT CHANGE UP (ref 3.5–5)
PROT SERPL-MCNC: 6 G/DL — SIGNIFICANT CHANGE UP (ref 6–8)
RBC # BLD: 4.08 M/UL — LOW (ref 4.7–6.1)
RBC # FLD: 12.5 % — SIGNIFICANT CHANGE UP (ref 11.5–14.5)
SODIUM SERPL-SCNC: 138 MMOL/L — SIGNIFICANT CHANGE UP (ref 135–146)
WBC # BLD: 11.01 K/UL — HIGH (ref 4.8–10.8)
WBC # FLD AUTO: 11.01 K/UL — HIGH (ref 4.8–10.8)

## 2025-05-02 RX ORDER — RIVASTIGMINE 13.3 MG/24H
1 PATCH, EXTENDED RELEASE TRANSDERMAL EVERY 24 HOURS
Refills: 0 | Status: DISCONTINUED | OUTPATIENT
Start: 2025-05-02 | End: 2025-05-14

## 2025-05-02 RX ADMIN — ATORVASTATIN CALCIUM 80 MILLIGRAM(S): 80 TABLET, FILM COATED ORAL at 21:37

## 2025-05-02 RX ADMIN — POLYETHYLENE GLYCOL 3350 17 GRAM(S): 17 POWDER, FOR SOLUTION ORAL at 18:18

## 2025-05-02 RX ADMIN — ENTACAPONE 200 MILLIGRAM(S): 200 TABLET, FILM COATED ORAL at 18:18

## 2025-05-02 RX ADMIN — ENTACAPONE 200 MILLIGRAM(S): 200 TABLET, FILM COATED ORAL at 05:52

## 2025-05-02 RX ADMIN — Medication 2 TABLET(S): at 05:52

## 2025-05-02 RX ADMIN — Medication 2 TABLET(S): at 13:40

## 2025-05-02 RX ADMIN — Medication 2 TABLET(S): at 18:18

## 2025-05-02 RX ADMIN — ENOXAPARIN SODIUM 40 MILLIGRAM(S): 100 INJECTION SUBCUTANEOUS at 13:40

## 2025-05-02 RX ADMIN — LEVETIRACETAM 500 MILLIGRAM(S): 10 INJECTION, SOLUTION INTRAVENOUS at 05:52

## 2025-05-02 RX ADMIN — Medication 2 TABLET(S): at 21:37

## 2025-05-02 RX ADMIN — CLOPIDOGREL BISULFATE 75 MILLIGRAM(S): 75 TABLET, FILM COATED ORAL at 13:41

## 2025-05-02 RX ADMIN — MIDODRINE HYDROCHLORIDE 10 MILLIGRAM(S): 5 TABLET ORAL at 13:41

## 2025-05-02 RX ADMIN — LEVETIRACETAM 500 MILLIGRAM(S): 10 INJECTION, SOLUTION INTRAVENOUS at 18:19

## 2025-05-02 RX ADMIN — MIDODRINE HYDROCHLORIDE 10 MILLIGRAM(S): 5 TABLET ORAL at 05:52

## 2025-05-02 RX ADMIN — ENTACAPONE 200 MILLIGRAM(S): 200 TABLET, FILM COATED ORAL at 14:11

## 2025-05-02 RX ADMIN — MIRTAZAPINE 7.5 MILLIGRAM(S): 30 TABLET, FILM COATED ORAL at 21:37

## 2025-05-02 RX ADMIN — Medication 1 TABLET(S): at 21:37

## 2025-05-02 RX ADMIN — Medication 1 APPLICATION(S): at 05:56

## 2025-05-02 RX ADMIN — Medication 3 MILLIGRAM(S): at 21:37

## 2025-05-02 RX ADMIN — Medication 81 MILLIGRAM(S): at 13:41

## 2025-05-02 RX ADMIN — MIDODRINE HYDROCHLORIDE 10 MILLIGRAM(S): 5 TABLET ORAL at 21:37

## 2025-05-02 RX ADMIN — ENTACAPONE 200 MILLIGRAM(S): 200 TABLET, FILM COATED ORAL at 21:37

## 2025-05-02 NOTE — DIETITIAN NUTRITION RISK NOTIFICATION - TREATMENT: THE FOLLOWING DIET HAS BEEN RECOMMENDED
Diet, Minced and Moist:   Consistent Carbohydrate {Evening Snack} (CSTCHOSN)  Supplement Feeding Modality:  Oral  Glucerna Shake Cans or Servings Per Day:  1       Frequency:  Two Times a day (05-01-25 @ 15:32) [Active]

## 2025-05-02 NOTE — PROGRESS NOTE ADULT - ASSESSMENT
#Rehab of scattered multiple foci of acute ischemic infarcts in multiple vascular territories (right subinsular region, bilateral thalami, right basal ganglia, bilateral medial temporal lobes and left cerebellum) causing worsened aphasia, right facial droop, gait abnormality and functional decline  #History of L bhatt radiate stroke in January 2025 with residual expressive aphasia/dysarthria/R facial droop.  - Etiology likely ICAD  - CTA on arrival with left M2/M3 focal occlusion. Stable focal moderate stenosis of the R V4 segment and L V4.  - CT perfusion: with 41 mL of delayed perfusion in the left frontal lobe correlating with the CTA finding  - Cerebral Angiogram: no occlusion noted but possible vasospasm. s/p 10mg IA verapamil   - ILR interrogated by EP: No AFib (ILR placed in 2025)   - TTE:  EF of 69 %., grade 1 diastolic dysfunction. Normal atria   - PRU on Plavix: 151- 4/27/25   - A1c 5.6,  LDL 44, TSH 0.88  - Home antiplatelets: Aspirin 81 mg and Plavix 75 mg   - C/w DAPT (Aspirin and Plavix) x 90 days, and then Aspirin monotherapy  - C/w lipitor 80mg qd  - increased Midodrine 10 mg q8 hrs on 5/1/25 to maintain BP/ cerebral perfusion  - SBP goal 130-160   - PT/OT/SLP/neuropsych -eval in progress  -The patient requires acute rehab with 3 hours of daily therapies at least 5 out of 7 days and close physiatry follow up.     #Severe Mixed Aphasia  - daily ST    #Dysphagia  - minced and moist diet  - ST f/u    #Hx of Seizure  - EEG 4/26/25 showed left hemispheric frontotemporally predominant focal cerebral dysfunction can be structural or functional in etiology.  - C/w home Keppra 500 mg bid    #Hx of Parkinson's Syndrome  - Home med:  - Home med-  Sinemet 25/100 TID, Sinemet 50/200 qhs   - C/w 25/250 TID and 50/200 qhs   - C/w rivastigmine 9.5mg patch qd  - C/w entacapone 200mg QID (7am/12am/5pm/9pm)    #Chronic Thrombocytopenia - resolved  - Plts stable  - Continue to monitor       #Misc  -Pain control:  tylenol  -GI/Bowel Mgmt: senna/miralax  -Skin: intact  -Diet, Minced and Moist:   Consistent Carbohydrate Evening Snack (CSTCHOSN)  Supplement Feeding Modality:  Oral  Glucerna Shake Cans or Servings Per Day:  1       Frequency:  Two Times a day (05-01-25 @ 15:32) [Active]      Precautions / PROPHYLAXIS:    - Falls, aspiration  -DVT Prophylaxis: Lovenox 40mg QD

## 2025-05-02 NOTE — CHART NOTE - NSCHARTNOTEFT_GEN_A_CORE
A Non formulary form filled  for Exelon patch 9.5 mg /24 hours  q24h  to be dispensed f [facility]  when available

## 2025-05-02 NOTE — PROGRESS NOTE ADULT - SUBJECTIVE AND OBJECTIVE BOX
Patient is a 74y old  Male who presents with a chief complaint of Rehab of bilateral  acute ischemic strokes  / aphasia, right facial droop, /  Parkinson's disease (01 May 2025 11:31)      HPI:  73 y/o M with PMHx of Parkinson's Syndrome HTN, DM, gout, seizures on keppra, h/o ischemic CVA x3 last stroke in January 2025 with residual expressive aphasia/dysarthria/R facial droop, who presented to the ED on 6/23 with severe expressive aphasia, LKW 9:30 pm. NIHSS 10 upon arrival for severe aphasia/dysarthria as pt nonverbal, intermittently able to follow commands, baseline R facial droop, LOC questions. mRs 1. NI alert activated 14:02. CTH negative, CTA H&N showed focal occlusion in the M2/M3 junction of the superior division of the left MCA, new since the prior CTA from 1/20/2023. New multifocal severe stenosis in the left A3 FITO. Stable moderate stenosis in the proximal right M1 MCA. New diffuse moderate to severe irregular stenosis in the bilateral PCAs, left worse than right. CTP with 41 mL of delayed perfusion in the left frontal lobe correlating with the CTA finding. No tnk as Pt out of window. NI actual activated. During angiogram, no occlusion observed to perform thrombectomy on however diffuse ICAD vs. vasospasm was seen in the left ICA for which he received 10mg IA Verapamil with improvement in vessel narrowing.  Post-op NIH 9. Exam improved to NIH 5 w/ BP augmentation. Pt was admitted to NSICU for BP augmentation following stroke  While in the NCC, remained on IV levophed for hemodynamic augmentation, tapering down; NIHSS was stable 5-6. On 4/27: aphasia worsened, stroke code was called. Urgent CTH obtained does not show any changes from the prior scan. Now downgraded to stroke unit on 4/29. Currently, Pt with NIHSS of 9 for LOC, right facial, Mute with severe expressive aphasia, can intermittently follow simple commands but not complex commands. During this hospital course, patient had scattered multiple foci of acute ischemic infarcts in multiple vascular territories (right subinsular region, bilateral thalami, right basal ganglia, bilateral medial temporal lobes and left cerebellum). Etiology likely ICAD as seen on MRI.     Imaging:  CT Head: No evidence of acute transcortical infarct, acute intracranial hemorrhage, or mass effect.  MR Head Non Contrast: Scattered multiple foci of acute infarcts in multiple vascular territories as described. No mass effect or midline shift. Multiple tiny foci showing signal dropout/blooming artifact on susceptibility weighted images at right subinsular region, bilateral thalami, right basal ganglia, bilateral medial temporal lobes and left cerebellum, probably representing microhemorrhages.  CT Angio Head and Neck: Focal occlusion in the M2/M3 junction of the superior division of the left MCA, new since the prior CTA from 1/20/2023. New multifocal severe stenosis in the left A3 FITO. Stable moderate stenosis in the proximal right M1 MCA. New diffuse moderate to severe irregular stenosis in the bilateral PCAs, left worse than right. Stable severe left/moderate right atherosclerotic stenosis in the V4 vertebral arteries.    Prior to admission, the patient was independent in ADLs and ambulation without an assistive device living in an assited living facility. Patient now requires assistance with ambulation and ADLs due to acute infarcts in multiple vascular territories (right subinsular region, bilateral thalami, right basal ganglia, bilateral medial temporal lobes and left cerebellum). Therefore, there is a significant functional decline from baseline. Patients also with medical comorbidities of seizures, parkinsons, HTN, DM, requiring medication management and monitoring of vitals by Physiatry team and nursing. To return home it is in the patient’s best interest to have acute inpatient rehabilitative services to undergo intensive interdisciplinary therapy. Furthermore, the patient is motivated and is able to tolerate up to 3 hours of daily therapy for 5-6 days a week for a total of 15 hours a week. Evaluated by Physiatry and deemed to be an excellent candidate for admission to  for acute inpatient rehab. Admitted to Acute Rehab on 5/1/25.    Pt seen and examined by PT, OT, ST and Physiatry and is currently functioning at modA bed mobility, maxA transfers, ambulates 5ft RW modA, then 35ft RW Reza, dressing with modA. On minced and moist diet for dysphagia.    LS: lives in assisted living facility with private A  PLOF: unable to confirm due to patient aphasia, as of 2/2025 discharged from acute rehab previously functioning at bed mobility supervision; Transfers sup/touchA-partialA; ambulation 150 ft x2/RW/touchA; UBD partialA; LBD partialA (01 May 2025 11:31)        TODAY'S SUBJECTIVE & REVIEW OF SYMPTOMS:    Alert. Appears calm and comfortable. Neuro exam stable. Vitals and labs reviewed by me.  Follows partial simple commands - inconsistent    Review of Systems: Unable to obtain due to mutism and inconsistent head nodding to questioning    PHYSICAL EXAM    Vital Signs Last 24 Hrs  T(C): 36.7 (05 May 2025 05:49), Max: 36.9 (04 May 2025 19:49)  T(F): 98 (05 May 2025 05:49), Max: 98.5 (04 May 2025 19:49)  HR: 66 (05 May 2025 13:11) (65 - 83)  BP: 163/91 (05 May 2025 13:11) (137/80 - 163/91)  BP(mean): 109 (05 May 2025 05:49) (99 - 109)  RR: 19 (05 May 2025 05:49) (17 - 19)  SpO2: 95% (05 May 2025 05:49) (94% - 95%)    Parameters below as of 05 May 2025 05:49  Patient On (Oxygen Delivery Method): room air      General:[ x  ] NAD, Resting Comfortable,   [   ] other:                                HEENT: [ x  ] NC/AT, EOMI, PERRL , Normal Conjunctiva,   [   ] other:  Cardio: [  x ] RRR, no murmur,   [   ] other:                              Pulm: [  x ] No Respiratory Distress,  Lungs CTAB,   [   ] other:                       Abdomen: [ x  ]ND/NT, Soft,   [   ] other:    : [ x  ] NO LAM CATHETER, [   ] LAM CATHETER- no meatal tear, no discharge, [   ] other:                                            MSK: [  x ] No joint swelling, Full ROM,   [   ] other:                                         Ext: [ x  ]No C/C/E, No calf tenderness,   [   ]other:    Skin: [ x  ]intact,   [   ] other:                                                                   Neurological Examination:  Cognitive: [    ] AAO x 3,   [  x  ]  other: awake, alert, can nod head but inconsistent with correct answer (Nodded yes if his name was "Shailesh") and follow simple commands less than 50% of time                                                                      Attention:  [ X   ] intact,   [    ]  other: can attend to interviewer on both right and left sides.                            Memory: [    ] intact,    [  X  ]  other: unable to assess 2/2 aphasia   Mood/Affect: [  X  ] wnl,    [    ]  other:                                                                             Communication: [    ]Fluent, no dysarthria, following commands:  [  X  ] other: mute, expressive>receptive aphasia, follows simple commands less than 50% of time with frequent redirection   CN II - XII:  [     ] intact,  [  X  ] other: Right lower facial droop, remainder grossly intact visual fields appear intact eyes tracking to stimuli     Motor:   RIGHT UE: [ X  ] WNL,  [   ] other:   LEFT    UE: [ X  ] WNL,  [   ] other:  RIGHT LE: [ X  ] WNL,  [  X ] other: 5-/5 HF   LEFT    LE: [ X  ] WNL,  [  X ] other: 5-/5 HF    Tone: [  X  ] wnl,   [    ]  other:  DTRs: [    ]symmetric, [  x ] other: R patellar reflex brisk 3+  Coordination:   [  X  ] intact,   [  X  ] other: slow to initiate movement may be 2/2 mental status                                                                        Sensory: [  X  ] Intact to light touch, (moves extremity when asked to after touching limb)   [    ] other:      MEDICATIONS  (STANDING):  aspirin  chewable 81 milliGRAM(s) Oral daily  atorvastatin 80 milliGRAM(s) Oral at bedtime  carbidopa/levodopa  25/250 2 Tablet(s) Oral <User Schedule>  carbidopa/levodopa CR 50/200 1 Tablet(s) Oral <User Schedule>  chlorhexidine 2% Cloths 1 Application(s) Topical <User Schedule>  clopidogrel Tablet 75 milliGRAM(s) Oral daily  enoxaparin Injectable 40 milliGRAM(s) SubCutaneous <User Schedule>  entacapone 200 milliGRAM(s) Oral <User Schedule>  levETIRAcetam 500 milliGRAM(s) Oral two times a day  melatonin 3 milliGRAM(s) Oral at bedtime  midodrine 10 milliGRAM(s) Oral every 8 hours  mirtazapine 7.5 milliGRAM(s) Oral at bedtime  polyethylene glycol 3350 17 Gram(s) Oral two times a day  rivastigmine patch  9.5 mG/24 Hr(s) 1 Patch Transdermal every 24 hours  senna 2 Tablet(s) Oral at bedtime    MEDICATIONS  (PRN):  acetaminophen     Tablet .. 650 milliGRAM(s) Oral every 6 hours PRN Temp greater or equal to 38C (100.4F), Mild Pain (1 - 3)      RECENT LABS/IMAGING                                   14.4   9.39  )-----------( 185      ( 05 May 2025 06:49 )             42.0     05-05    142  |  104  |  13  ----------------------------<  83  4.3   |  25  |  0.9    Ca    8.8      05 May 2025 06:49  Mg     2.1     05-05    TPro  5.8[L]  /  Alb  3.5  /  TBili  0.7  /  DBili  x   /  AST  61[H]  /  ALT  20  /  AlkPhos  108  05-05      POCT Blood Glucose.: 125 mg/dL (05-05-25 @ 11:26)  POCT Blood Glucose.: 95 mg/dL (05-05-25 @ 07:23)  POCT Blood Glucose.: 141 mg/dL (05-04-25 @ 16:31)  POCT Blood Glucose.: 91 mg/dL (05-04-25 @ 07:56)  POCT Blood Glucose.: 126 mg/dL (05-03-25 @ 16:56)  POCT Blood Glucose.: 150 mg/dL (05-03-25 @ 11:09)  POCT Blood Glucose.: 101 mg/dL (05-03-25 @ 07:27)  POCT Blood Glucose.: 150 mg/dL (05-02-25 @ 16:27)  POCT Blood Glucose.: 153 mg/dL (05-02-25 @ 12:44)

## 2025-05-03 LAB
GLUCOSE BLDC GLUCOMTR-MCNC: 101 MG/DL — HIGH (ref 70–99)
GLUCOSE BLDC GLUCOMTR-MCNC: 126 MG/DL — HIGH (ref 70–99)
GLUCOSE BLDC GLUCOMTR-MCNC: 150 MG/DL — HIGH (ref 70–99)

## 2025-05-03 PROCEDURE — 93971 EXTREMITY STUDY: CPT | Mod: 26,RT

## 2025-05-03 RX ADMIN — Medication 81 MILLIGRAM(S): at 12:06

## 2025-05-03 RX ADMIN — RIVASTIGMINE 1 PATCH: 13.3 PATCH, EXTENDED RELEASE TRANSDERMAL at 19:59

## 2025-05-03 RX ADMIN — RIVASTIGMINE 1 PATCH: 13.3 PATCH, EXTENDED RELEASE TRANSDERMAL at 06:07

## 2025-05-03 RX ADMIN — Medication 3 MILLIGRAM(S): at 21:36

## 2025-05-03 RX ADMIN — Medication 2 TABLET(S): at 17:47

## 2025-05-03 RX ADMIN — MIRTAZAPINE 7.5 MILLIGRAM(S): 30 TABLET, FILM COATED ORAL at 21:36

## 2025-05-03 RX ADMIN — ENTACAPONE 200 MILLIGRAM(S): 200 TABLET, FILM COATED ORAL at 21:36

## 2025-05-03 RX ADMIN — MIDODRINE HYDROCHLORIDE 10 MILLIGRAM(S): 5 TABLET ORAL at 06:07

## 2025-05-03 RX ADMIN — ENTACAPONE 200 MILLIGRAM(S): 200 TABLET, FILM COATED ORAL at 17:47

## 2025-05-03 RX ADMIN — POLYETHYLENE GLYCOL 3350 17 GRAM(S): 17 POWDER, FOR SOLUTION ORAL at 06:18

## 2025-05-03 RX ADMIN — MIDODRINE HYDROCHLORIDE 10 MILLIGRAM(S): 5 TABLET ORAL at 21:36

## 2025-05-03 RX ADMIN — CLOPIDOGREL BISULFATE 75 MILLIGRAM(S): 75 TABLET, FILM COATED ORAL at 12:06

## 2025-05-03 RX ADMIN — Medication 1 APPLICATION(S): at 06:18

## 2025-05-03 RX ADMIN — Medication 2 TABLET(S): at 12:05

## 2025-05-03 RX ADMIN — LEVETIRACETAM 500 MILLIGRAM(S): 10 INJECTION, SOLUTION INTRAVENOUS at 17:47

## 2025-05-03 RX ADMIN — Medication 2 TABLET(S): at 06:08

## 2025-05-03 RX ADMIN — MIDODRINE HYDROCHLORIDE 10 MILLIGRAM(S): 5 TABLET ORAL at 13:47

## 2025-05-03 RX ADMIN — ATORVASTATIN CALCIUM 80 MILLIGRAM(S): 80 TABLET, FILM COATED ORAL at 21:36

## 2025-05-03 RX ADMIN — LEVETIRACETAM 500 MILLIGRAM(S): 10 INJECTION, SOLUTION INTRAVENOUS at 06:08

## 2025-05-03 RX ADMIN — Medication 2 TABLET(S): at 21:36

## 2025-05-03 RX ADMIN — ENTACAPONE 200 MILLIGRAM(S): 200 TABLET, FILM COATED ORAL at 12:06

## 2025-05-03 RX ADMIN — RIVASTIGMINE 1 PATCH: 13.3 PATCH, EXTENDED RELEASE TRANSDERMAL at 07:31

## 2025-05-03 RX ADMIN — ENTACAPONE 200 MILLIGRAM(S): 200 TABLET, FILM COATED ORAL at 06:08

## 2025-05-03 RX ADMIN — Medication 1 TABLET(S): at 21:36

## 2025-05-03 RX ADMIN — ENOXAPARIN SODIUM 40 MILLIGRAM(S): 100 INJECTION SUBCUTANEOUS at 13:47

## 2025-05-03 NOTE — PROGRESS NOTE ADULT - TIME BILLING
chart review, comprehensive teaching history and physical  with resident, patient and family (phone call to HCP), counseling and education regarding disease risk factors and expected outcome and the inpatient rehab process, coordination with nursing, ACP and therapists and discussion with discharging team regarding handoff of the patient's condition and hospital course.

## 2025-05-03 NOTE — PROGRESS NOTE ADULT - SUBJECTIVE AND OBJECTIVE BOX
Patient is a 74y old  Male who presents with a chief complaint of Rehab of bilateral  acute ischemic strokes  / aphasia, right facial droop, /  Parkinson's disease (01 May 2025 11:31)      HPI:  75 y/o M with PMHx of parkinsons, HTN, DM, gout, seizures on keppra, h/o ischemic CVA x3 last stroke in January 2025 with residual expressive aphasia/dysarthria/R facial droop, who presented to the ED on 6/23 with severe expressive aphasia, LKW 9:30 pm. NIHSS 10 upon arrival for severe aphasia/dysarthria as pt nonverbal, intermittently able to follow commands, baseline R facial droop, LOC questions. mRs 1. NI alert activated 14:02. CTH negative, CTA H&N showed focal occlusion in the M2/M3 junction of the superior division of the left MCA, new since the prior CTA from 1/20/2023. New multifocal severe stenosis in the left A3 FITO. Stable moderate stenosis in the proximal right M1 MCA. New diffuse moderate to severe irregular stenosis in the bilateral PCAs, left worse than right. CTP with 41 mL of delayed perfusion in the left frontal lobe correlating with the CTA finding. No tnk as Pt out of window. NI actual activated. During angiogram, no occlusion observed to perform thrombectomy on however diffuse ICAD vs. vasospasm was seen in the left ICA for which he received 10mg IA Verapamil with improvement in vessel narrowing.  Post-op NIH 9. Exam improved to NIH 5 w/ BP augmentation. Pt was admitted to NSICU for BP augmentation following stroke  While in the NCC, remained on IV levophed for hemodynamic augmentation, tapering down; NIHSS was stable 5-6. On 4/27: aphasia worsened, stroke code was called. Urgent CTH obtained does not show any changes from the prior scan. Now downgraded to stroke unit on 4/29. Currently, Pt with NIHSS of 9 for LOC, right facial, Mute with severe expressive aphasia, can intermittently follow simple commands but not complex commands. During this hospital course, patient had scattered multiple foci of acute ischemic infarcts in multiple vascular territories (right subinsular region, bilateral thalami, right basal ganglia, bilateral medial temporal lobes and left cerebellum). Etiology likely ICAD as seen on MRI.     Imaging:  CT Head: No evidence of acute transcortical infarct, acute intracranial hemorrhage, or mass effect.  MR Head Non Contrast: Scattered multiple foci of acute infarcts in multiple vascular territories as described. No mass effect or midline shift. Multiple tiny foci showing signal dropout/blooming artifact on susceptibility weighted images at right subinsular region, bilateral thalami, right basal ganglia, bilateral medial temporal lobes and left cerebellum, probably representing microhemorrhages.  CT Angio Head and Neck: Focal occlusion in the M2/M3 junction of the superior division of the left MCA, new since the prior CTA from 1/20/2023. New multifocal severe stenosis in the left A3 FITO. Stable moderate stenosis in the proximal right M1 MCA. New diffuse moderate to severe irregular stenosis in the bilateral PCAs, left worse than right. Stable severe left/moderate right atherosclerotic stenosis in the V4 vertebral arteries.    Prior to admission, the patient was independent in ADLs and ambulation without an assistive device living in an assited living facility. Patient now requires assistance with ambulation and ADLs due to acute infarcts in multiple vascular territories (right subinsular region, bilateral thalami, right basal ganglia, bilateral medial temporal lobes and left cerebellum). Therefore, there is a significant functional decline from baseline. Patients also with medical comorbidities of seizures, parkinsons, HTN, DM, requiring medication management and monitoring of vitals by Physiatry team and nursing. To return home it is in the patient’s best interest to have acute inpatient rehabilitative services to undergo intensive interdisciplinary therapy. Furthermore, the patient is motivated and is able to tolerate up to 3 hours of daily therapy for 5-6 days a week for a total of 15 hours a week. Evaluated by Physiatry and deemed to be an excellent candidate for admission to  for acute inpatient rehab. Admitted to Acute Rehab on 5/1/25.    Pt seen and examined by PT, OT, ST and Physiatry and is currently functioning at modA bed mobility, maxA transfers, ambulates 5ft RW modA, then 35ft RW Reza, dressing with modA. On minced and moist diet for dysphagia.    LS: lives in assisted living facility with private A  PLOF: unable to confirm due to patient aphasia, as of 2/2025 discharged from acute rehab previously functioning at bed mobility supervision; Transfers sup/touchA-partialA; ambulation 150 ft x2/RW/touchA; UBD partialA; LBD partialA (01 May 2025 11:31)      I examined the patient and reviewed the chart. There have been no significant changes since my history and physical except where documented below.    TODAY'S SUBJECTIVE & REVIEW OF SYMPTOMS:  Patient seen and examined this morning.   No overnight events.   Tolerating PT/OT and oral intake well.   Voiding bladder spontaneously and regularly; will inform RN/PCA to record if any BM.  Vital signs reviewed.     Also, patient has ecchymosis on right elbow and mild Right forearm edema; will order doppler RUE US.  Also requested RN to use allevyn patch on both elbows to prevent any skin breakdown.    Review of Systems: Unable to obtain due to mutism and inconsistent head nodding to questioning.      PHYSICAL EXAM    Vital Signs Last 24 Hrs  T(C): 36.8 (03 May 2025 05:45), Max: 36.8 (02 May 2025 21:33)  T(F): 98.3 (03 May 2025 05:45), Max: 98.3 (02 May 2025 21:33)  HR: 60 (03 May 2025 05:45) (60 - 75)  BP: 154/81 (03 May 2025 05:45) (149/83 - 154/81)  BP(mean): 106 (03 May 2025 05:45) (106 - 106)  RR: 18 (03 May 2025 05:45) (18 - 18)  SpO2: 94% (02 May 2025 21:33) (94% - 94%)    Parameters below as of 02 May 2025 21:33  Patient On (Oxygen Delivery Method): room air      General:[ x  ] NAD, Resting Comfortable,   [   ] other:                                HEENT: [ x  ] NC/AT, EOMI, PERRL , Normal Conjunctiva,   [   ] other:  Cardio: [  x ] RRR, no murmur,   [   ] other:                              Pulm: [  x ] No Respiratory Distress,  Lungs CTAB,   [   ] other:                       Abdomen: [ x  ]ND/NT, Soft,   [   ] other:    : [ x  ] NO LAM CATHETER, [   ] LAM CATHETER- no meatal tear, no discharge, [   ] other:                                            MSK: [  x ] No joint swelling, Full ROM,   [   ] other:                                         Ext: [ x  ]No C/C/E, No calf tenderness,   [   ]other:    Skin: [ x  ]intact,   [   ] other:                                                                   Neurological Examination:  Cognitive: [    ] AAO x 3,   [  x  ]  other: awake, alert, can nod head but inconsistent with correct answer (Nodded yes if his name was "Shailesh") and follow simple commands less than 50% of time                                                                      Attention:  [ X   ] intact,   [    ]  other: can attend to interviewer on both right and left sides.                            Memory: [    ] intact,    [  X  ]  other: unable to assess 2/2 aphasia   Mood/Affect: [  X  ] wnl,    [    ]  other:                                                                             Communication: [    ]Fluent, no dysarthria, following commands:  [  X  ] other: mute, expressive>receptive aphasia, follows simple commands less than 50% of time with frequent redirection   CN II - XII:  [     ] intact,  [  X  ] other: Right lower facial droop, remainder grossly intact visual fields appear intact eyes tracking to stimuli     Med  acetaminophen     Tablet .. 650 milliGRAM(s) Oral every 6 hours PRN  aspirin  chewable 81 milliGRAM(s) Oral daily  atorvastatin 80 milliGRAM(s) Oral at bedtime  carbidopa/levodopa  25/250 2 Tablet(s) Oral <User Schedule>  carbidopa/levodopa CR 50/200 1 Tablet(s) Oral <User Schedule>  chlorhexidine 2% Cloths 1 Application(s) Topical <User Schedule>  clopidogrel Tablet 75 milliGRAM(s) Oral daily  enoxaparin Injectable 40 milliGRAM(s) SubCutaneous <User Schedule>  entacapone 200 milliGRAM(s) Oral <User Schedule>  levETIRAcetam 500 milliGRAM(s) Oral two times a day  melatonin 3 milliGRAM(s) Oral at bedtime  midodrine 10 milliGRAM(s) Oral every 8 hours  mirtazapine 7.5 milliGRAM(s) Oral at bedtime  polyethylene glycol 3350 17 Gram(s) Oral two times a day  rivastigmine patch  9.5 mG/24 Hr(s) 1 Patch Transdermal every 24 hours  senna 2 Tablet(s) Oral at bedtime      RECENT LABS/IMAGING                        13.9   11.01 )-----------( 145      ( 02 May 2025 05:36 )             40.3     05-02    138  |  103  |  12  ----------------------------<  85  4.5   |  27  |  0.9    Ca    8.6      02 May 2025 05:36  Mg     2.0     05-02    TPro  6.0  /  Alb  3.5  /  TBili  1.1  /  DBili  x   /  AST  34  /  ALT  8   /  AlkPhos  99  05-02      Urinalysis Basic - ( 02 May 2025 05:36 )    Color: x / Appearance: x / SG: x / pH: x  Gluc: 85 mg/dL / Ketone: x  / Bili: x / Urobili: x   Blood: x / Protein: x / Nitrite: x   Leuk Esterase: x / RBC: x / WBC x   Sq Epi: x / Non Sq Epi: x / Bacteria: x   Patient is a 74 y old male who presents with a chief complaint of Rehab of bilateral  acute ischemic strokes  / aphasia, right facial droop /  Parkinson's disease (01 May 2025 11:31)      HPI:  73 y/o M with PMHx of parkinsons, HTN, DM, gout, seizures on Keppra, h/o ischemic CVA x3 last stroke in January 2025 with residual expressive aphasia/dysarthria/R facial droop, who presented to the ED on 6/23 with severe expressive aphasia, LKW 9:30 pm. NIHSS 10 upon arrival for severe aphasia/dysarthria as pt nonverbal, intermittently able to follow commands, baseline R facial droop, LOC questions. mRs 1. NI alert activated 14:02. CTH negative, CTA H&N showed focal occlusion in the M2/M3 junction of the superior division of the left MCA, new since the prior CTA from 1/20/2023. New multifocal severe stenosis in the left A3 FITO. Stable moderate stenosis in the proximal right M1 MCA. New diffuse moderate to severe irregular stenosis in the bilateral PCAs, left worse than right. CTP with 41 mL of delayed perfusion in the left frontal lobe correlating with the CTA finding. No tnk as Pt out of window. NI actual activated. During angiogram, no occlusion observed to perform thrombectomy on however diffuse ICAD vs. vasospasm was seen in the left ICA for which he received 10mg IA Verapamil with improvement in vessel narrowing.  Post-op NIH 9. Exam improved to NIH 5 w/ BP augmentation. Pt was admitted to NSICU for BP augmentation following stroke  While in the NCC, remained on IV levophed for hemodynamic augmentation, tapering down; NIHSS was stable 5-6. On 4/27: aphasia worsened, stroke code was called. Urgent CTH obtained does not show any changes from the prior scan. Now downgraded to stroke unit on 4/29. Currently, Pt with NIHSS of 9 for LOC, right facial, Mute with severe expressive aphasia, can intermittently follow simple commands but not complex commands. During this hospital course, patient had scattered multiple foci of acute ischemic infarcts in multiple vascular territories (right subinsular region, bilateral thalami, right basal ganglia, bilateral medial temporal lobes and left cerebellum). Etiology likely ICAD as seen on MRI.     Imaging:  CT Head: No evidence of acute transcortical infarct, acute intracranial hemorrhage, or mass effect.  MR Head Non Contrast: Scattered multiple foci of acute infarcts in multiple vascular territories as described. No mass effect or midline shift. Multiple tiny foci showing signal dropout/blooming artifact on susceptibility weighted images at right subinsular region, bilateral thalami, right basal ganglia, bilateral medial temporal lobes and left cerebellum, probably representing microhemorrhages.  CT Angio Head and Neck: Focal occlusion in the M2/M3 junction of the superior division of the left MCA, new since the prior CTA from 1/20/2023. New multifocal severe stenosis in the left A3 FITO. Stable moderate stenosis in the proximal right M1 MCA. New diffuse moderate to severe irregular stenosis in the bilateral PCAs, left worse than right. Stable severe left/moderate right atherosclerotic stenosis in the V4 vertebral arteries.    Prior to admission, the patient was independent in ADLs and ambulation without an assistive device living in an assited living facility. Patient now requires assistance with ambulation and ADLs due to acute infarcts in multiple vascular territories (right subinsular region, bilateral thalami, right basal ganglia, bilateral medial temporal lobes and left cerebellum). Therefore, there is a significant functional decline from baseline. Patients also with medical comorbidities of seizures, parkinsons, HTN, DM, requiring medication management and monitoring of vitals by Physiatry team and nursing. To return home it is in the patient’s best interest to have acute inpatient rehabilitative services to undergo intensive interdisciplinary therapy. Furthermore, the patient is motivated and is able to tolerate up to 3 hours of daily therapy for 5-6 days a week for a total of 15 hours a week. Evaluated by Physiatry and deemed to be an excellent candidate for admission to  for acute inpatient rehab. Admitted to Acute Rehab on 5/1/25.    Pt seen and examined by PT, OT, ST and Physiatry and is currently functioning at modA bed mobility, maxA transfers, ambulates 5ft RW modA, then 35ft RW Reza, dressing with modA. On minced and moist diet for dysphagia.    LS: lives in assisted living facility with private A  PLOF: unable to confirm due to patient aphasia, as of 2/2025 discharged from acute rehab previously functioning at bed mobility supervision; Transfers sup/touchA-partialA; ambulation 150 ft x2/RW/touchA; UBD partialA; LBD partialA (01 May 2025 11:31)      I examined the patient and reviewed the chart. There have been no significant changes since the history and physical except where documented below.    TODAY'S SUBJECTIVE & REVIEW OF SYMPTOMS:  Patient seen and examined this morning.   No overnight events.   Tolerating PT/OT and oral intake well.   Voiding bladder spontaneously and regularly; will inform RN/PCA to record if any BM.  Vital signs reviewed.     Also, patient has ecchymosis on right elbow and mild Right forearm edema; will order doppler RUE US.  Also requested RN to use allevyn patch on both elbows to prevent any skin breakdown.    Review of Systems: Unable to obtain due to mutism and inconsistent head nodding to questioning.    PHYSICAL EXAM    Vital Signs Last 24 Hrs  T(C): 36.8 (03 May 2025 05:45), Max: 36.8 (02 May 2025 21:33)  T(F): 98.3 (03 May 2025 05:45), Max: 98.3 (02 May 2025 21:33)  HR: 60 (03 May 2025 05:45) (60 - 75)  BP: 154/81 (03 May 2025 05:45) (149/83 - 154/81)  BP(mean): 106 (03 May 2025 05:45) (106 - 106)  RR: 18 (03 May 2025 05:45) (18 - 18)  SpO2: 94% (02 May 2025 21:33) (94% - 94%)    Parameters below as of 02 May 2025 21:33  Patient On (Oxygen Delivery Method): room air      General:[ x  ] NAD, Resting Comfortable,   [   ] other:                                HEENT: [ x  ] NC/AT, EOMI, PERRL , Normal Conjunctiva,   [   ] other:  Cardio: [  x ] RRR, no murmur,   [   ] other:                              Pulm: [  x ] No Respiratory Distress,  Lungs CTAB,   [   ] other:                       Abdomen: [ x  ]ND/NT, Soft,   [   ] other:    : [ x  ] NO LAM CATHETER, [   ] LAM CATHETER- no meatal tear, no discharge, [   ] other:                                            MSK: [  x ] No joint swelling, Full ROM,   [   ] other:                                         Ext: [ x  ]No C/C/E, No calf tenderness,   [   ]other:    Skin: [ x  ]intact,   [   ] other:                                                                   Neurological Examination:  Cognitive: [    ] AAO x 3,   [  x  ]  other: awake, alert, can nod head but inconsistent with correct answer (Nodded yes if his name was "Shailesh") and follow simple commands less than 50% of time                                                                      Attention:  [ X   ] intact,   [    ]  other: can attend to interviewer on both right and left sides.                            Memory: [    ] intact,    [  X  ]  other: unable to assess 2/2 aphasia   Mood/Affect: [  X  ] wnl,    [    ]  other:                                                                             Communication: [    ]Fluent, no dysarthria, following commands:  [  X  ] other: mute, expressive>receptive aphasia, follows simple commands less than 50% of time with frequent redirection   CN II - XII:  [     ] intact,  [  X  ] other: Right lower facial droop, remainder grossly intact visual fields appear intact eyes tracking to stimuli     Med  acetaminophen     Tablet .. 650 milliGRAM(s) Oral every 6 hours PRN  aspirin  chewable 81 milliGRAM(s) Oral daily  atorvastatin 80 milliGRAM(s) Oral at bedtime  carbidopa/levodopa  25/250 2 Tablet(s) Oral <User Schedule>  carbidopa/levodopa CR 50/200 1 Tablet(s) Oral <User Schedule>  chlorhexidine 2% Cloths 1 Application(s) Topical <User Schedule>  clopidogrel Tablet 75 milliGRAM(s) Oral daily  enoxaparin Injectable 40 milliGRAM(s) SubCutaneous <User Schedule>  entacapone 200 milliGRAM(s) Oral <User Schedule>  levETIRAcetam 500 milliGRAM(s) Oral two times a day  melatonin 3 milliGRAM(s) Oral at bedtime  midodrine 10 milliGRAM(s) Oral every 8 hours  mirtazapine 7.5 milliGRAM(s) Oral at bedtime  polyethylene glycol 3350 17 Gram(s) Oral two times a day  rivastigmine patch  9.5 mG/24 Hr(s) 1 Patch Transdermal every 24 hours  senna 2 Tablet(s) Oral at bedtime      RECENT LABS/IMAGING                        13.9   11.01 )-----------( 145      ( 02 May 2025 05:36 )             40.3     05-02    138  |  103  |  12  ----------------------------<  85  4.5   |  27  |  0.9    Ca    8.6      02 May 2025 05:36  Mg     2.0     05-02    TPro  6.0  /  Alb  3.5  /  TBili  1.1  /  DBili  x   /  AST  34  /  ALT  8   /  AlkPhos  99  05-02      Urinalysis Basic - ( 02 May 2025 05:36 )    Color: x / Appearance: x / SG: x / pH: x  Gluc: 85 mg/dL / Ketone: x  / Bili: x / Urobili: x   Blood: x / Protein: x / Nitrite: x   Leuk Esterase: x / RBC: x / WBC x   Sq Epi: x / Non Sq Epi: x / Bacteria: x

## 2025-05-03 NOTE — PROGRESS NOTE ADULT - ASSESSMENT
Patient is a 75 y/o M with PMHx of parkinsons, HTN, DM, gout, seizures on keppra, prior ischemic stroke x3 most recently January 2025 with residual expressive aphasia/dysarthria/R facial droop, who presented to the ED on 6/23 with severe expressive aphasia, NIHSS 10 upon arrival for severe aphasia/dysarthria as pt nonverbal, intermittently able to follow commands, baseline R facial droop, LOC questions. mRs 1. NI alert activated, CTH negative, CTA H&N showed focal occlusion in the M2/M3 junction of the superior division of the left MCA, new since the prior CTA from 1/20/2023. New multifocal severe stenosis in the left A3 FITO. Stable moderate stenosis in the proximal right M1 MCA. New diffuse moderate to severe irregular stenosis in the bilateral PCAs, left worse than right. CTP with 41 mL of delayed perfusion in the left frontal lobe correlating with the CTA finding. No tnk as Pt out of window. NI activated, During angiogram, no occlusion observed to perform thrombectomy on however diffuse ICAD vs. vasospasm was seen in the left ICA for which he received 10mg IA Verapamil with improvement in vessel narrowing.  Post-op NIH 9. Exam improved to NIH 5 w/ BP augmentation. Pt was admitted to NSICU for BP augmentation following stroke  While in the NCC, remained on IV levophed for hemodynamic augmentation, tapering down; NIHSS was stable 5-6. On 4/27: aphasia worsened, stroke code was called. Urgent CTH obtained does not show any changes from the prior scan. Now downgraded to stroke unit on 4/29. Currently, Pt with NIHSS of 9 for LOC, right facial, Mute with severe expressive aphasia, can intermittently follow simple commands but not complex commands. Patient hemodynamically stable for admission to acute rehab on 5/1/25.     #Rehab of scattered multiple foci of acute ischemic infarcts in multiple vascular territories (right subinsular region, bilateral thalami, right basal ganglia, bilateral medial temporal lobes and left cerebellum) causing worsened aphasia, right facial droop, gait abnormality and functional decline  #History of L bhatt radiate stroke in January 2025 with residual expressive aphasia/dysarthria/R facial droop.  - Etiology likely ICAD  - CTA on arrival with left M2/M3 focal occlusion. Stable focal moderate stenosis of the R V4 segment and L V4.  - CT perfusion: with 41 mL of delayed perfusion in the left frontal lobe correlating with the CTA finding  - Cerebral Angiogram: no occlusion noted but possible vasospasm. s/p 10mg IA verapamil   - ILR interrogated by EP: No AFib (ILR placed in 2025)   - TTE:  EF of 69 %., grade 1 diastolic dysfunction. Normal atria   - PRU on Plavix: 151- 4/27/25   - A1c 5.6,  LDL 44, TSH 0.88  - Home antiplatelets: Aspirin 81 mg and Plavix 75 mg   - C/w DAPT (Aspirin and Plavix) x 90 days, and then Aspirin monotherapy  - C/w lipitor 80mg qd  - increased Midodrine 10 mg q8 hrs on 5/1/25  - SBP goal 130-160   - PT/OT/SLP/neuropsych  -The patient requires acute rehab with 3 hours of daily therapies at least 5 out of 7 days and close physiatry follow up.     #Hx of Seizure  - EEG 4/26/25 showed left hemispheric frontotemporally predominant focal cerebral dysfunction can be structural or functional in etiology.  - C/w home Keppra 500 mg bid    #Hx of Parkinson  - Home med: Sinemet 25/100 TID, Sinemet 50/200 qhs   - C/w 25/250 TID and 50/200 qhs   - C/w rivastigmine 9.5mg patch qd  - C/w entacapone 200mg QID (7am/12am/5pm/9pm)    #Chronic Thrombocytopenia - resolved  - Plts stable  - Continue to monitor       #Misc  -Pain control:  tylenol  -GI/Bowel Mgmt: senna/miralax  -Skin: intact  -Diet: minced and moist, thins, CC  On remeron at bedtime    Precautions / PROPHYLAXIS:    - Falls, aspiration  -DVT Prophylaxis: Chemical: lovenox 40mg QD  and Mechanical: SCDs     Patient is a 75 y/o M with PMHx of Parkinson's, HTN, DM, gout, seizures on keppra, prior ischemic stroke x3 most recently January 2025 with residual expressive aphasia/dysarthria/R facial droop, who presented to the ED on 6/23 with severe expressive aphasia, NIHSS 10 upon arrival for severe aphasia/dysarthria as pt nonverbal, intermittently able to follow commands, baseline R facial droop, LOC questions. mRs 1. NI alert activated, CTH negative, CTA H&N showed focal occlusion in the M2/M3 junction of the superior division of the left MCA, new since the prior CTA from 1/20/2023. New multifocal severe stenosis in the left A3 FITO. Stable moderate stenosis in the proximal right M1 MCA. New diffuse moderate to severe irregular stenosis in the bilateral PCAs, left worse than right. CTP with 41 mL of delayed perfusion in the left frontal lobe correlating with the CTA finding. No tnk as Pt out of window. NI activated, During angiogram, no occlusion observed to perform thrombectomy on however diffuse ICAD vs. vasospasm was seen in the left ICA for which he received 10mg IA Verapamil with improvement in vessel narrowing.  Post-op NIH 9. Exam improved to NIH 5 w/ BP augmentation. Pt was admitted to NSICU for BP augmentation following stroke  While in the NCC, remained on IV levophed for hemodynamic augmentation, tapering down; NIHSS was stable 5-6. On 4/27: aphasia worsened, stroke code was called. Urgent CTH obtained does not show any changes from the prior scan. Now downgraded to stroke unit on 4/29. Currently, Pt with NIHSS of 9 for LOC, right facial, Mute with severe expressive aphasia, can intermittently follow simple commands but not complex commands. Patient hemodynamically stable for admission to acute rehab on 5/1/25.     #Rehab for scattered multiple foci of acute ischemic infarcts in multiple vascular territories (right subinsular region, bilateral thalami, right basal ganglia, bilateral medial temporal lobes and left cerebellum) causing worsened aphasia, right facial droop, gait abnormality and functional decline  #History for L corona radiata stroke in January 2025 with residual expressive aphasia/dysarthria/R facial droop.  - Etiology likely ICAD  - CTA on arrival with left M2/M3 focal occlusion. Stable focal moderate stenosis of the R V4 segment and L V4.  - CT perfusion: with 41 mL of delayed perfusion in the left frontal lobe correlating with the CTA finding  - Cerebral Angiogram: no occlusion noted but possible vasospasm. s/p 10 mg IA verapamil   - ILR interrogated by EP: No AFib (ILR placed in 2025)   - TTE:  EF of 69 %., grade 1 diastolic dysfunction. Normal atria   - PRU on Plavix: 151- 4/27/25   - A1c 5.6,  LDL 44, TSH 0.88  - Home antiplatelets: Aspirin 81 mg and Plavix 75 mg   - C/w DAPT (Aspirin and Plavix) x 90 days, and then Aspirin monotherapy  - C/w Lipitor 80 mg qd  - increased midodrine 10 mg q8 hrs on 5/1/25  - SBP goal 130-160   - PT/OT/SLP/neuropsych  -The patient requires acute rehab with 3 hours of daily therapies at least 5 out of 7 days and close physiatry follow up.     #Hx of Seizure  - EEG 4/26/25 showed left hemispheric frontotemporally predominant focal cerebral dysfunction can be structural or functional in etiology.  - C/w home Keppra 500 mg bid    #Hx of Parkinson  - Home med: Sinemet 25/100 TID, Sinemet 50/200 qhs   - C/w 25/250 TID and 50/200 qhs   - C/w rivastigmine 9.5 mg patch qd  - C/w entacapone 200 mg QID (7am/12am/5pm/9pm)    #Chronic Thrombocytopenia - resolved  - Plts stable  - Continue to monitor       #Misc  -Pain control:  Tylenol  -GI/Bowel Mgmt: senna/Miralax  -Skin: intact  -Diet: minced and moist, thins, CC  On Remeron at bedtime    Precautions / PROPHYLAXIS:    - Falls, aspiration  -DVT Prophylaxis: Chemical: Lovenox 40 mg QD  and Mechanical: SCDs

## 2025-05-03 NOTE — PROGRESS NOTE ADULT - REASON FOR ADMISSION
Rehab of bilateral  acute ischemic strokes  / aphasia, right facial droop, /  Parkinson's disease Rehab for bilateral  acute ischemic strokes  / aphasia, right facial droop /  Parkinson's disease

## 2025-05-04 LAB
GLUCOSE BLDC GLUCOMTR-MCNC: 141 MG/DL — HIGH (ref 70–99)
GLUCOSE BLDC GLUCOMTR-MCNC: 91 MG/DL — SIGNIFICANT CHANGE UP (ref 70–99)

## 2025-05-04 RX ADMIN — Medication 81 MILLIGRAM(S): at 12:22

## 2025-05-04 RX ADMIN — RIVASTIGMINE 1 PATCH: 13.3 PATCH, EXTENDED RELEASE TRANSDERMAL at 06:21

## 2025-05-04 RX ADMIN — RIVASTIGMINE 1 PATCH: 13.3 PATCH, EXTENDED RELEASE TRANSDERMAL at 06:09

## 2025-05-04 RX ADMIN — ENTACAPONE 200 MILLIGRAM(S): 200 TABLET, FILM COATED ORAL at 18:13

## 2025-05-04 RX ADMIN — ENTACAPONE 200 MILLIGRAM(S): 200 TABLET, FILM COATED ORAL at 06:09

## 2025-05-04 RX ADMIN — POLYETHYLENE GLYCOL 3350 17 GRAM(S): 17 POWDER, FOR SOLUTION ORAL at 06:17

## 2025-05-04 RX ADMIN — ENTACAPONE 200 MILLIGRAM(S): 200 TABLET, FILM COATED ORAL at 12:25

## 2025-05-04 RX ADMIN — Medication 1 TABLET(S): at 22:01

## 2025-05-04 RX ADMIN — Medication 3 MILLIGRAM(S): at 22:01

## 2025-05-04 RX ADMIN — CLOPIDOGREL BISULFATE 75 MILLIGRAM(S): 75 TABLET, FILM COATED ORAL at 12:23

## 2025-05-04 RX ADMIN — LEVETIRACETAM 500 MILLIGRAM(S): 10 INJECTION, SOLUTION INTRAVENOUS at 06:09

## 2025-05-04 RX ADMIN — ATORVASTATIN CALCIUM 80 MILLIGRAM(S): 80 TABLET, FILM COATED ORAL at 22:00

## 2025-05-04 RX ADMIN — Medication 2 TABLET(S): at 12:25

## 2025-05-04 RX ADMIN — Medication 1 APPLICATION(S): at 06:17

## 2025-05-04 RX ADMIN — MIDODRINE HYDROCHLORIDE 10 MILLIGRAM(S): 5 TABLET ORAL at 14:29

## 2025-05-04 RX ADMIN — LEVETIRACETAM 500 MILLIGRAM(S): 10 INJECTION, SOLUTION INTRAVENOUS at 18:12

## 2025-05-04 RX ADMIN — Medication 2 TABLET(S): at 06:09

## 2025-05-04 RX ADMIN — RIVASTIGMINE 1 PATCH: 13.3 PATCH, EXTENDED RELEASE TRANSDERMAL at 19:49

## 2025-05-04 RX ADMIN — MIDODRINE HYDROCHLORIDE 10 MILLIGRAM(S): 5 TABLET ORAL at 06:09

## 2025-05-04 RX ADMIN — ENTACAPONE 200 MILLIGRAM(S): 200 TABLET, FILM COATED ORAL at 22:01

## 2025-05-04 RX ADMIN — Medication 2 TABLET(S): at 22:01

## 2025-05-04 RX ADMIN — ENOXAPARIN SODIUM 40 MILLIGRAM(S): 100 INJECTION SUBCUTANEOUS at 15:24

## 2025-05-04 RX ADMIN — MIDODRINE HYDROCHLORIDE 10 MILLIGRAM(S): 5 TABLET ORAL at 22:01

## 2025-05-04 RX ADMIN — MIRTAZAPINE 7.5 MILLIGRAM(S): 30 TABLET, FILM COATED ORAL at 22:01

## 2025-05-04 RX ADMIN — Medication 2 TABLET(S): at 18:14

## 2025-05-04 NOTE — PROGRESS NOTE ADULT - REASON FOR ADMISSION
Rehab of bilateral  acute ischemic strokes  / aphasia, right facial droop, /  Parkinson's disease Rehab for bilateral  acute ischemic strokes  / aphasia, right facial droop, /  Parkinson's disease

## 2025-05-04 NOTE — PROGRESS NOTE ADULT - SUBJECTIVE AND OBJECTIVE BOX
Patient is a 74 y old male who presents with a chief complaint of Rehab of bilateral  acute ischemic strokes  / aphasia, right facial droop /  Parkinson's disease (01 May 2025 11:31)      HPI:  73 y/o M with PMHx of parkinsons, HTN, DM, gout, seizures on Keppra, h/o ischemic CVA x3 last stroke in January 2025 with residual expressive aphasia/dysarthria/R facial droop, who presented to the ED on 6/23 with severe expressive aphasia, LKW 9:30 pm. NIHSS 10 upon arrival for severe aphasia/dysarthria as pt nonverbal, intermittently able to follow commands, baseline R facial droop, LOC questions. mRs 1. NI alert activated 14:02. CTH negative, CTA H&N showed focal occlusion in the M2/M3 junction of the superior division of the left MCA, new since the prior CTA from 1/20/2023. New multifocal severe stenosis in the left A3 FITO. Stable moderate stenosis in the proximal right M1 MCA. New diffuse moderate to severe irregular stenosis in the bilateral PCAs, left worse than right. CTP with 41 mL of delayed perfusion in the left frontal lobe correlating with the CTA finding. No tnk as Pt out of window. NI actual activated. During angiogram, no occlusion observed to perform thrombectomy on however diffuse ICAD vs. vasospasm was seen in the left ICA for which he received 10mg IA Verapamil with improvement in vessel narrowing.  Post-op NIH 9. Exam improved to NIH 5 w/ BP augmentation. Pt was admitted to NSICU for BP augmentation following stroke  While in the NCC, remained on IV levophed for hemodynamic augmentation, tapering down; NIHSS was stable 5-6. On 4/27: aphasia worsened, stroke code was called. Urgent CTH obtained does not show any changes from the prior scan. Now downgraded to stroke unit on 4/29. Currently, Pt with NIHSS of 9 for LOC, right facial, Mute with severe expressive aphasia, can intermittently follow simple commands but not complex commands. During this hospital course, patient had scattered multiple foci of acute ischemic infarcts in multiple vascular territories (right subinsular region, bilateral thalami, right basal ganglia, bilateral medial temporal lobes and left cerebellum). Etiology likely ICAD as seen on MRI.     Imaging:  CT Head: No evidence of acute transcortical infarct, acute intracranial hemorrhage, or mass effect.  MR Head Non Contrast: Scattered multiple foci of acute infarcts in multiple vascular territories as described. No mass effect or midline shift. Multiple tiny foci showing signal dropout/blooming artifact on susceptibility weighted images at right subinsular region, bilateral thalami, right basal ganglia, bilateral medial temporal lobes and left cerebellum, probably representing microhemorrhages.  CT Angio Head and Neck: Focal occlusion in the M2/M3 junction of the superior division of the left MCA, new since the prior CTA from 1/20/2023. New multifocal severe stenosis in the left A3 FITO. Stable moderate stenosis in the proximal right M1 MCA. New diffuse moderate to severe irregular stenosis in the bilateral PCAs, left worse than right. Stable severe left/moderate right atherosclerotic stenosis in the V4 vertebral arteries.    Prior to admission, the patient was independent in ADLs and ambulation without an assistive device living in an assited living facility. Patient now requires assistance with ambulation and ADLs due to acute infarcts in multiple vascular territories (right subinsular region, bilateral thalami, right basal ganglia, bilateral medial temporal lobes and left cerebellum). Therefore, there is a significant functional decline from baseline. Patients also with medical comorbidities of seizures, parkinsons, HTN, DM, requiring medication management and monitoring of vitals by Physiatry team and nursing. To return home it is in the patient’s best interest to have acute inpatient rehabilitative services to undergo intensive interdisciplinary therapy. Furthermore, the patient is motivated and is able to tolerate up to 3 hours of daily therapy for 5-6 days a week for a total of 15 hours a week. Evaluated by Physiatry and deemed to be an excellent candidate for admission to  for acute inpatient rehab. Admitted to Acute Rehab on 5/1/25.    Pt seen and examined by PT, OT, ST and Physiatry and is currently functioning at modA bed mobility, maxA transfers, ambulates 5ft RW modA, then 35ft RW Reza, dressing with modA. On minced and moist diet for dysphagia.    LS: lives in assisted living facility with private A  PLOF: unable to confirm due to patient aphasia, as of 2/2025 discharged from acute rehab previously functioning at bed mobility supervision; Transfers sup/touchA-partialA; ambulation 150 ft x2/RW/touchA; UBD partialA; LBD partialA (01 May 2025 11:31)    TODAY'S SUBJECTIVE & REVIEW OF SYMPTOMS:  Patient seen and examined this morning.   No overnight events. Patient does not verbally respond to interview, inconsistent head nods. Patient has ecchymosis on right elbow and mild Right forearm edema; doppler RUE US done pending read. Started on allevyn patch on both elbows to prevent any skin breakdown.  Tolerating PT/OT and oral intake well.   Voiding bladder spontaneously and regularly; will inform RN/PCA to record if any BM.  Vital signs reviewed.     Review of Systems: Unable to obtain due to mutism and inconsistent head nodding to questioning.    PHYSICAL EXAM  Vital Signs Last 24 Hrs  T(C): 36.4 (04 May 2025 05:47), Max: 36.7 (03 May 2025 20:38)  T(F): 97.6 (04 May 2025 05:47), Max: 98 (03 May 2025 20:38)  HR: 58 (04 May 2025 05:47) (58 - 73)  BP: 147/88 (04 May 2025 05:47) (145/87 - 147/88)  BP(mean): 107 (04 May 2025 05:47) (106 - 107)  RR: 19 (04 May 2025 05:47) (17 - 19)  SpO2: 96% (04 May 2025 05:47) (95% - 96%)    Parameters below as of 04 May 2025 05:47  Patient On (Oxygen Delivery Method): room air      General:[ x  ] NAD, Resting Comfortable,   [   ] other:                                HEENT: [ x  ] NC/AT, EOMI, PERRL , Normal Conjunctiva,   [   ] other:  Cardio: [  x ] RRR, no murmur,   [   ] other:                              Pulm: [  x ] No Respiratory Distress,  Lungs CTAB,   [   ] other:                       Abdomen: [ x  ]ND/NT, Soft,   [   ] other:    : [ x  ] NO LAM CATHETER, [   ] LAM CATHETER- no meatal tear, no discharge, [   ] other:                                            MSK: [  x ] No joint swelling, Full ROM,   [   ] other:                                         Ext: [ x  ]No C/C/E, No calf tenderness,   [   ]other:    Skin: [ x  ]intact,   [   ] other:                                                                   Neurological Examination:  Cognitive: [    ] AAO x 3,   [  x  ]  other: awake, alert, can nod head but inconsistent with correct answer (Nodded yes if his name was "Shailesh") and follow simple commands less than 50% of time                                                                      Attention:  [ X   ] intact,   [    ]  other: can attend to interviewer on both right and left sides.                            Memory: [    ] intact,    [  X  ]  other: unable to assess 2/2 aphasia   Mood/Affect: [  X  ] wnl,    [    ]  other:                                                                             Communication: [    ]Fluent, no dysarthria, following commands:  [  X  ] other: mute, expressive>receptive aphasia, follows simple commands less than 50% of time with frequent redirection   CN II - XII:  [     ] intact,  [  X  ] other: Right lower facial droop, remainder grossly intact visual fields appear intact eyes tracking to stimuli     MEDICATIONS  (STANDING):  aspirin  chewable 81 milliGRAM(s) Oral daily  atorvastatin 80 milliGRAM(s) Oral at bedtime  carbidopa/levodopa  25/250 2 Tablet(s) Oral <User Schedule>  carbidopa/levodopa CR 50/200 1 Tablet(s) Oral <User Schedule>  chlorhexidine 2% Cloths 1 Application(s) Topical <User Schedule>  clopidogrel Tablet 75 milliGRAM(s) Oral daily  enoxaparin Injectable 40 milliGRAM(s) SubCutaneous <User Schedule>  entacapone 200 milliGRAM(s) Oral <User Schedule>  levETIRAcetam 500 milliGRAM(s) Oral two times a day  melatonin 3 milliGRAM(s) Oral at bedtime  midodrine 10 milliGRAM(s) Oral every 8 hours  mirtazapine 7.5 milliGRAM(s) Oral at bedtime  polyethylene glycol 3350 17 Gram(s) Oral two times a day  rivastigmine patch  9.5 mG/24 Hr(s) 1 Patch Transdermal every 24 hours  senna 2 Tablet(s) Oral at bedtime    MEDICATIONS  (PRN):  acetaminophen     Tablet .. 650 milliGRAM(s) Oral every 6 hours PRN Temp greater or equal to 38C (100.4F), Mild Pain (1 - 3)      RECENT LABS/IMAGING              POCT Blood Glucose.: 91 mg/dL (05-04-25 @ 07:56)  POCT Blood Glucose.: 126 mg/dL (05-03-25 @ 16:56)  POCT Blood Glucose.: 150 mg/dL (05-03-25 @ 11:09)  POCT Blood Glucose.: 101 mg/dL (05-03-25 @ 07:27)  POCT Blood Glucose.: 150 mg/dL (05-02-25 @ 16:27)  POCT Blood Glucose.: 153 mg/dL (05-02-25 @ 12:44)       Patient is a 74 y old male who presents with a chief complaint of Rehab of bilateral  acute ischemic strokes  / aphasia, right facial droop /  Parkinson's disease (01 May 2025 11:31)      HPI:  75 y/o M with PMHx of parkinsons, HTN, DM, gout, seizures on Keppra, h/o ischemic CVA x3 last stroke in January 2025 with residual expressive aphasia/dysarthria/R facial droop, who presented to the ED on 6/23 with severe expressive aphasia, LKW 9:30 pm. NIHSS 10 upon arrival for severe aphasia/dysarthria as pt nonverbal, intermittently able to follow commands, baseline R facial droop, LOC questions. mRs 1. NI alert activated 14:02. CTH negative, CTA H&N showed focal occlusion in the M2/M3 junction of the superior division of the left MCA, new since the prior CTA from 1/20/2023. New multifocal severe stenosis in the left A3 FITO. Stable moderate stenosis in the proximal right M1 MCA. New diffuse moderate to severe irregular stenosis in the bilateral PCAs, left worse than right. CTP with 41 mL of delayed perfusion in the left frontal lobe correlating with the CTA finding. No tnk as Pt out of window. NI actual activated. During angiogram, no occlusion observed to perform thrombectomy on however diffuse ICAD vs. vasospasm was seen in the left ICA for which he received 10mg IA Verapamil with improvement in vessel narrowing.  Post-op NIH 9. Exam improved to NIH 5 w/ BP augmentation. Pt was admitted to NSICU for BP augmentation following stroke  While in the NCC, remained on IV levophed for hemodynamic augmentation, tapering down; NIHSS was stable 5-6. On 4/27: aphasia worsened, stroke code was called. Urgent CTH obtained does not show any changes from the prior scan. Now downgraded to stroke unit on 4/29. Currently, Pt with NIHSS of 9 for LOC, right facial, Mute with severe expressive aphasia, can intermittently follow simple commands but not complex commands. During this hospital course, patient had scattered multiple foci of acute ischemic infarcts in multiple vascular territories (right subinsular region, bilateral thalami, right basal ganglia, bilateral medial temporal lobes and left cerebellum). Etiology likely ICAD as seen on MRI.     Imaging:  CT Head: No evidence of acute transcortical infarct, acute intracranial hemorrhage, or mass effect.  MR Head Non Contrast: Scattered multiple foci of acute infarcts in multiple vascular territories as described. No mass effect or midline shift. Multiple tiny foci showing signal dropout/blooming artifact on susceptibility weighted images at right subinsular region, bilateral thalami, right basal ganglia, bilateral medial temporal lobes and left cerebellum, probably representing microhemorrhages.  CT Angio Head and Neck: Focal occlusion in the M2/M3 junction of the superior division of the left MCA, new since the prior CTA from 1/20/2023. New multifocal severe stenosis in the left A3 FITO. Stable moderate stenosis in the proximal right M1 MCA. New diffuse moderate to severe irregular stenosis in the bilateral PCAs, left worse than right. Stable severe left/moderate right atherosclerotic stenosis in the V4 vertebral arteries.    Prior to admission, the patient was independent in ADLs and ambulation without an assistive device living in an assited living facility. Patient now requires assistance with ambulation and ADLs due to acute infarcts in multiple vascular territories (right subinsular region, bilateral thalami, right basal ganglia, bilateral medial temporal lobes and left cerebellum). Therefore, there is a significant functional decline from baseline. Patients also with medical comorbidities of seizures, parkinsons, HTN, DM, requiring medication management and monitoring of vitals by Physiatry team and nursing. To return home it is in the patient’s best interest to have acute inpatient rehabilitative services to undergo intensive interdisciplinary therapy. Furthermore, the patient is motivated and is able to tolerate up to 3 hours of daily therapy for 5-6 days a week for a total of 15 hours a week. Evaluated by Physiatry and deemed to be an excellent candidate for admission to  for acute inpatient rehab. Admitted to Acute Rehab on 5/1/25.    Pt seen and examined by PT, OT, ST and Physiatry and is currently functioning at modA bed mobility, maxA transfers, ambulates 5ft RW modA, then 35ft RW Reza, dressing with modA. On minced and moist diet for dysphagia.    LS: lives in assisted living facility with private A  PLOF: unable to confirm due to patient aphasia, as of 2/2025 discharged from acute rehab previously functioning at bed mobility supervision; Transfers sup/touchA-partialA; ambulation 150 ft x2/RW/touchA; UBD partialA; LBD partialA (01 May 2025 11:31)    TODAY'S SUBJECTIVE & REVIEW OF SYMPTOMS:  Patient seen and examined this morning.   No overnight events. Patient does not verbally respond to interview, inconsistent head nods. Patient has ecchymosis on right elbow and mild Right forearm edema; doppler RUE US done negative for DVT. Started on allevyn patch on both elbows to prevent any skin breakdown.  Tolerating PT/OT and oral intake well.   Voiding bladder spontaneously and regularly; will inform RN/PCA to record if any BM.  Vital signs reviewed.     Review of Systems: Unable to obtain due to mutism and inconsistent head nodding to questioning.    PHYSICAL EXAM  Vital Signs Last 24 Hrs  T(C): 36.4 (04 May 2025 05:47), Max: 36.7 (03 May 2025 20:38)  T(F): 97.6 (04 May 2025 05:47), Max: 98 (03 May 2025 20:38)  HR: 58 (04 May 2025 05:47) (58 - 73)  BP: 147/88 (04 May 2025 05:47) (145/87 - 147/88)  BP(mean): 107 (04 May 2025 05:47) (106 - 107)  RR: 19 (04 May 2025 05:47) (17 - 19)  SpO2: 96% (04 May 2025 05:47) (95% - 96%)    Parameters below as of 04 May 2025 05:47  Patient On (Oxygen Delivery Method): room air      General:[ x  ] NAD, Resting Comfortable,   [   ] other:                                HEENT: [ x  ] NC/AT, EOMI, PERRL , Normal Conjunctiva,   [   ] other:  Cardio: [  x ] RRR, no murmur,   [   ] other:                              Pulm: [  x ] No Respiratory Distress,  Lungs CTAB,   [   ] other:                       Abdomen: [ x  ]ND/NT, Soft,   [   ] other:    : [ x  ] NO LAM CATHETER, [   ] LAM CATHETER- no meatal tear, no discharge, [   ] other:                                            MSK: [  x ] No joint swelling, Full ROM,   [   ] other:                                         Ext: [ x  ]No C/C/E, No calf tenderness,   [ X  ]other:  RUE edema  Skin: [ x  ]intact,   [   ] other:                                                                   Neurological Examination:  Cognitive: [    ] AAO x 3,   [  x  ]  other: awake, alert, can nod head but inconsistent with correct answer (Nodded yes if his name was "Shailesh") and follow simple commands less than 50% of time                                                                      Attention:  [ X   ] intact,   [    ]  other: can attend to interviewer on both right and left sides.                            Memory: [    ] intact,    [  X  ]  other: unable to assess 2/2 aphasia   Mood/Affect: [  X  ] wnl,    [    ]  other:                                                                             Communication: [    ]Fluent, no dysarthria, following commands:  [  X  ] other: mute, expressive>receptive aphasia, follows simple commands less than 50% of time with frequent redirection   CN II - XII:  [     ] intact,  [  X  ] other: Right lower facial droop, remainder grossly intact visual fields appear intact eyes tracking to stimuli     MEDICATIONS  (STANDING):  aspirin  chewable 81 milliGRAM(s) Oral daily  atorvastatin 80 milliGRAM(s) Oral at bedtime  carbidopa/levodopa  25/250 2 Tablet(s) Oral <User Schedule>  carbidopa/levodopa CR 50/200 1 Tablet(s) Oral <User Schedule>  chlorhexidine 2% Cloths 1 Application(s) Topical <User Schedule>  clopidogrel Tablet 75 milliGRAM(s) Oral daily  enoxaparin Injectable 40 milliGRAM(s) SubCutaneous <User Schedule>  entacapone 200 milliGRAM(s) Oral <User Schedule>  levETIRAcetam 500 milliGRAM(s) Oral two times a day  melatonin 3 milliGRAM(s) Oral at bedtime  midodrine 10 milliGRAM(s) Oral every 8 hours  mirtazapine 7.5 milliGRAM(s) Oral at bedtime  polyethylene glycol 3350 17 Gram(s) Oral two times a day  rivastigmine patch  9.5 mG/24 Hr(s) 1 Patch Transdermal every 24 hours  senna 2 Tablet(s) Oral at bedtime    MEDICATIONS  (PRN):  acetaminophen     Tablet .. 650 milliGRAM(s) Oral every 6 hours PRN Temp greater or equal to 38C (100.4F), Mild Pain (1 - 3)      RECENT LABS/IMAGING              POCT Blood Glucose.: 91 mg/dL (05-04-25 @ 07:56)  POCT Blood Glucose.: 126 mg/dL (05-03-25 @ 16:56)  POCT Blood Glucose.: 150 mg/dL (05-03-25 @ 11:09)  POCT Blood Glucose.: 101 mg/dL (05-03-25 @ 07:27)  POCT Blood Glucose.: 150 mg/dL (05-02-25 @ 16:27)  POCT Blood Glucose.: 153 mg/dL (05-02-25 @ 12:44)       Patient is a 74 y old male who presents with a chief complaint of Rehab for bilateral  acute ischemic strokes  / aphasia, right facial droop /  Parkinson's disease (01 May 2025 11:31)      HPI:  73 y/o M with PMHx of Parkinson's, HTN, DM, gout, seizures on Keppra, h/o ischemic CVA x3 last stroke in January 2025 with residual expressive aphasia/dysarthria/R facial droop, who presented to the ED on 6/23 with severe expressive aphasia, LKW 9:30 pm. NIHSS 10 upon arrival for severe aphasia/dysarthria as pt nonverbal, intermittently able to follow commands, baseline R facial droop, LOC questions. mRs 1. NI alert activated 14:02. CTH negative, CTA H&N showed focal occlusion in the M2/M3 junction of the superior division of the left MCA, new since the prior CTA from 1/20/2023. New multifocal severe stenosis in the left A3 FITO. Stable moderate stenosis in the proximal right M1 MCA. New diffuse moderate to severe irregular stenosis in the bilateral PCAs, left worse than right. CTP with 41 mL of delayed perfusion in the left frontal lobe correlating with the CTA finding. No tnk as Pt out of window. NI actual activated. During angiogram, no occlusion observed to perform thrombectomy on however diffuse ICAD vs. vasospasm was seen in the left ICA for which he received 10mg IA Verapamil with improvement in vessel narrowing.  Post-op NIH 9. Exam improved to NIH 5 w/ BP augmentation. Pt was admitted to NSICU for BP augmentation following stroke  While in the NCC, remained on IV levophed for hemodynamic augmentation, tapering down; NIHSS was stable 5-6. On 4/27: aphasia worsened, stroke code was called. Urgent CTH obtained does not show any changes from the prior scan. Now downgraded to stroke unit on 4/29. Currently, Pt with NIHSS of 9 for LOC, right facial, Mute with severe expressive aphasia, can intermittently follow simple commands but not complex commands. During this hospital course, patient had scattered multiple foci of acute ischemic infarcts in multiple vascular territories (right subinsular region, bilateral thalami, right basal ganglia, bilateral medial temporal lobes and left cerebellum). Etiology likely ICAD as seen on MRI.     Imaging:  CT Head: No evidence of acute transcortical infarct, acute intracranial hemorrhage, or mass effect.  MR Head Non Contrast: Scattered multiple foci of acute infarcts in multiple vascular territories as described. No mass effect or midline shift. Multiple tiny foci showing signal dropout/blooming artifact on susceptibility weighted images at right subinsular region, bilateral thalami, right basal ganglia, bilateral medial temporal lobes and left cerebellum, probably representing microhemorrhages.  CT Angio Head and Neck: Focal occlusion in the M2/M3 junction of the superior division of the left MCA, new since the prior CTA from 1/20/2023. New multifocal severe stenosis in the left A3 FITO. Stable moderate stenosis in the proximal right M1 MCA. New diffuse moderate to severe irregular stenosis in the bilateral PCAs, left worse than right. Stable severe left/moderate right atherosclerotic stenosis in the V4 vertebral arteries.    Prior to admission, the patient was independent in ADLs and ambulation without an assistive device living in an assited living facility. Patient now requires assistance with ambulation and ADLs due to acute infarcts in multiple vascular territories (right subinsular region, bilateral thalami, right basal ganglia, bilateral medial temporal lobes and left cerebellum). Therefore, there is a significant functional decline from baseline. Patients also with medical comorbidities of seizures, parkinsons, HTN, DM, requiring medication management and monitoring of vitals by Physiatry team and nursing. To return home it is in the patient’s best interest to have acute inpatient rehabilitative services to undergo intensive interdisciplinary therapy. Furthermore, the patient is motivated and is able to tolerate up to 3 hours of daily therapy for 5-6 days a week for a total of 15 hours a week. Evaluated by Physiatry and deemed to be an excellent candidate for admission to  for acute inpatient rehab. Admitted to Acute Rehab on 5/1/25.    Pt seen and examined by PT, OT, ST and Physiatry and is currently functioning at modA bed mobility, maxA transfers, ambulates 5 ft RW modA, then 35 ft RW Reza, dressing with modA. On minced and moist diet for dysphagia.    LS: lives in assisted living facility with private A  PLOF: unable to confirm due to patient aphasia, as of 2/2025 discharged from acute rehab previously functioning at bed mobility supervision; Transfers sup/touchA-partialA; ambulation 150 ft x2/RW/touchA; UBD partialA; LBD partialA (01 May 2025 11:31)    TODAY'S SUBJECTIVE & REVIEW OF SYMPTOMS:  Patient seen and examined this morning.   No overnight events. Patient does not verbally respond to interview, inconsistent head nods. Patient has ecchymosis on right elbow and mild right forearm edema; doppler RUE US done negative for DVT. Started on allevyn patch on both elbows to prevent any skin breakdown.  Tolerating PT/OT and oral intake well.   Voiding bladder spontaneously and regularly; will inform RN/PCA to record if any BM.  Vital signs reviewed.     Review of Systems: Unable to obtain due to mutism and inconsistent head nodding to questioning.    PHYSICAL EXAM  Vital Signs Last 24 Hrs  T(C): 36.4 (04 May 2025 05:47), Max: 36.7 (03 May 2025 20:38)  T(F): 97.6 (04 May 2025 05:47), Max: 98 (03 May 2025 20:38)  HR: 58 (04 May 2025 05:47) (58 - 73)  BP: 147/88 (04 May 2025 05:47) (145/87 - 147/88)  BP(mean): 107 (04 May 2025 05:47) (106 - 107)  RR: 19 (04 May 2025 05:47) (17 - 19)  SpO2: 96% (04 May 2025 05:47) (95% - 96%)    Parameters below as of 04 May 2025 05:47  Patient On (Oxygen Delivery Method): room air      General:[ x  ] NAD, Resting Comfortable,   [   ] other:                                HEENT: [ x  ] NC/AT, EOMI, PERRL , Normal Conjunctiva,   [   ] other:  Cardio: [  x ] RRR, no murmur,   [   ] other:                              Pulm: [  x ] No Respiratory Distress,  Lungs CTAB,   [   ] other:                       Abdomen: [ x  ]ND/NT, Soft,   [   ] other:    : [ x  ] NO LAM CATHETER, [   ] LAM CATHETER- no meatal tear, no discharge, [   ] other:                                            MSK: [  x ] No joint swelling, Full ROM,   [   ] other:                                         Ext: [ x  ]No C/C/E, No calf tenderness,   [ X  ]other:  RUE edema  Skin: [ x  ]intact,   [   ] other:                                                                   Neurological Examination:  Cognitive: [    ] AAO x 3,   [  x  ]  other: awake, alert, can nod head but inconsistent with correct answer (Nodded yes if his name was "Shailesh") and follow simple commands less than 50% of time                                                                      Attention:  [ X   ] intact,   [    ]  other: can attend to interviewer on both right and left sides.                            Memory: [    ] intact,    [  X  ]  other: unable to assess 2/2 aphasia   Mood/Affect: [  X  ] wnl,    [    ]  other:                                                                             Communication: [    ]Fluent, no dysarthria, following commands:  [  X  ] other: mute, expressive>receptive aphasia, follows simple commands less than 50% of time with frequent redirection   CN II - XII:  [     ] intact,  [  X  ] other: Right lower facial droop, remainder grossly intact visual fields appear intact eyes tracking to stimuli     MEDICATIONS  (STANDING):  aspirin  chewable 81 milliGRAM(s) Oral daily  atorvastatin 80 milliGRAM(s) Oral at bedtime  carbidopa/levodopa  25/250 2 Tablet(s) Oral <User Schedule>  carbidopa/levodopa CR 50/200 1 Tablet(s) Oral <User Schedule>  chlorhexidine 2% Cloths 1 Application(s) Topical <User Schedule>  clopidogrel Tablet 75 milliGRAM(s) Oral daily  enoxaparin Injectable 40 milliGRAM(s) SubCutaneous <User Schedule>  entacapone 200 milliGRAM(s) Oral <User Schedule>  levETIRAcetam 500 milliGRAM(s) Oral two times a day  melatonin 3 milliGRAM(s) Oral at bedtime  midodrine 10 milliGRAM(s) Oral every 8 hours  mirtazapine 7.5 milliGRAM(s) Oral at bedtime  polyethylene glycol 3350 17 Gram(s) Oral two times a day  rivastigmine patch  9.5 mG/24 Hr(s) 1 Patch Transdermal every 24 hours  senna 2 Tablet(s) Oral at bedtime    MEDICATIONS  (PRN):  acetaminophen     Tablet .. 650 milliGRAM(s) Oral every 6 hours PRN Temp greater or equal to 38C (100.4F), Mild Pain (1 - 3)      RECENT LABS/IMAGING              POCT Blood Glucose.: 91 mg/dL (05-04-25 @ 07:56)  POCT Blood Glucose.: 126 mg/dL (05-03-25 @ 16:56)  POCT Blood Glucose.: 150 mg/dL (05-03-25 @ 11:09)  POCT Blood Glucose.: 101 mg/dL (05-03-25 @ 07:27)  POCT Blood Glucose.: 150 mg/dL (05-02-25 @ 16:27)  POCT Blood Glucose.: 153 mg/dL (05-02-25 @ 12:44)

## 2025-05-04 NOTE — PROGRESS NOTE ADULT - ASSESSMENT
Patient is a 73 y/o M with PMHx of Parkinson's, HTN, DM, gout, seizures on keppra, prior ischemic stroke x3 most recently January 2025 with residual expressive aphasia/dysarthria/R facial droop, who presented to the ED on 6/23 with severe expressive aphasia, NIHSS 10 upon arrival for severe aphasia/dysarthria as pt nonverbal, intermittently able to follow commands, baseline R facial droop, LOC questions. mRs 1. NI alert activated, CTH negative, CTA H&N showed focal occlusion in the M2/M3 junction of the superior division of the left MCA, new since the prior CTA from 1/20/2023. New multifocal severe stenosis in the left A3 FITO. Stable moderate stenosis in the proximal right M1 MCA. New diffuse moderate to severe irregular stenosis in the bilateral PCAs, left worse than right. CTP with 41 mL of delayed perfusion in the left frontal lobe correlating with the CTA finding. No tnk as Pt out of window. NI activated, During angiogram, no occlusion observed to perform thrombectomy on however diffuse ICAD vs. vasospasm was seen in the left ICA for which he received 10mg IA Verapamil with improvement in vessel narrowing.  Post-op NIH 9. Exam improved to NIH 5 w/ BP augmentation. Pt was admitted to NSICU for BP augmentation following stroke  While in the NCC, remained on IV levophed for hemodynamic augmentation, tapering down; NIHSS was stable 5-6. On 4/27: aphasia worsened, stroke code was called. Urgent CTH obtained does not show any changes from the prior scan. Now downgraded to stroke unit on 4/29. Currently, Pt with NIHSS of 9 for LOC, right facial, Mute with severe expressive aphasia, can intermittently follow simple commands but not complex commands. Patient hemodynamically stable for admission to acute rehab on 5/1/25.     #Rehab for scattered multiple foci of acute ischemic infarcts in multiple vascular territories (right subinsular region, bilateral thalami, right basal ganglia, bilateral medial temporal lobes and left cerebellum) causing worsened aphasia, right facial droop, gait abnormality and functional decline  #History for L corona radiata stroke in January 2025 with residual expressive aphasia/dysarthria/R facial droop.  - Etiology likely ICAD  - CTA on arrival with left M2/M3 focal occlusion. Stable focal moderate stenosis of the R V4 segment and L V4.  - CT perfusion: with 41 mL of delayed perfusion in the left frontal lobe correlating with the CTA finding  - Cerebral Angiogram: no occlusion noted but possible vasospasm. s/p 10 mg IA verapamil   - ILR interrogated by EP: No AFib (ILR placed in 2025)   - TTE:  EF of 69 %., grade 1 diastolic dysfunction. Normal atria   - PRU on Plavix: 151- 4/27/25   - A1c 5.6,  LDL 44, TSH 0.88  - Home antiplatelets: Aspirin 81 mg and Plavix 75 mg   - C/w DAPT (Aspirin and Plavix) x 90 days, and then Aspirin monotherapy  - C/w Lipitor 80 mg qd  - increased midodrine 10 mg q8 hrs on 5/1/25  - SBP goal 130-160   - PT/OT/SLP/neuropsych  -The patient requires acute rehab with 3 hours of daily therapies at least 5 out of 7 days and close physiatry follow up.     #Hx of Seizure  - EEG 4/26/25 showed left hemispheric frontotemporally predominant focal cerebral dysfunction can be structural or functional in etiology.  - C/w home Keppra 500 mg bid    #Hx of Parkinson  - Home med: Sinemet 25/100 TID, Sinemet 50/200 qhs   - C/w 25/250 TID and 50/200 qhs   - C/w rivastigmine 9.5 mg patch qd  - C/w entacapone 200 mg QID (7am/12am/5pm/9pm)    #Chronic Thrombocytopenia - resolved  - Plts stable  - Continue to monitor     #Misc  -Pain control:  Tylenol  -GI/Bowel Mgmt: senna/Miralax  -Skin: intact  -Diet: minced and moist, thins, CC  On Remeron at bedtime    Precautions / PROPHYLAXIS:    - Falls, aspiration  -DVT Prophylaxis: Chemical: Lovenox 40 mg QD  and Mechanical: SCDs     Patient is a 73 y/o M with PMHx of Parkinson's, HTN, DM, gout, seizures on keppra, prior ischemic stroke x3 most recently January 2025 with residual expressive aphasia/dysarthria/R facial droop, who presented to the ED on 6/23 with severe expressive aphasia, NIHSS 10 upon arrival for severe aphasia/dysarthria as pt nonverbal, intermittently able to follow commands, baseline R facial droop, LOC questions. mRs 1. NI alert activated, CTH negative, CTA H&N showed focal occlusion in the M2/M3 junction of the superior division of the left MCA, new since the prior CTA from 1/20/2023. New multifocal severe stenosis in the left A3 FITO. Stable moderate stenosis in the proximal right M1 MCA. New diffuse moderate to severe irregular stenosis in the bilateral PCAs, left worse than right. CTP with 41 mL of delayed perfusion in the left frontal lobe correlating with the CTA finding. No tnk as Pt out of window. NI activated, During angiogram, no occlusion observed to perform thrombectomy on however diffuse ICAD vs. vasospasm was seen in the left ICA for which he received 10mg IA Verapamil with improvement in vessel narrowing.  Post-op NIH 9. Exam improved to NIH 5 w/ BP augmentation. Pt was admitted to NSICU for BP augmentation following stroke  While in the NCC, remained on IV levophed for hemodynamic augmentation, tapering down; NIHSS was stable 5-6. On 4/27: aphasia worsened, stroke code was called. Urgent CTH obtained does not show any changes from the prior scan. Now downgraded to stroke unit on 4/29. Currently, Pt with NIHSS of 9 for LOC, right facial, Mute with severe expressive aphasia, can intermittently follow simple commands but not complex commands. Patient hemodynamically stable for admission to acute rehab on 5/1/25.     #Rehab for scattered multiple foci of acute ischemic infarcts in multiple vascular territories (right subinsular region, bilateral thalami, right basal ganglia, bilateral medial temporal lobes and left cerebellum) causing worsened aphasia, right facial droop, gait abnormality and functional decline  #History for L corona radiata stroke in January 2025 with residual expressive aphasia/dysarthria/R facial droop.  - Etiology likely ICAD  - CTA on arrival with left M2/M3 focal occlusion. Stable focal moderate stenosis of the R V4 segment and L V4.  - CT perfusion: with 41 mL of delayed perfusion in the left frontal lobe correlating with the CTA finding  - Cerebral Angiogram: no occlusion noted but possible vasospasm. s/p 10 mg IA verapamil   - ILR interrogated by EP: No AFib (ILR placed in 2025)   - TTE:  EF of 69 %., grade 1 diastolic dysfunction. Normal atria   - PRU on Plavix: 151- 4/27/25   - A1c 5.6,  LDL 44, TSH 0.88  - Home antiplatelets: Aspirin 81 mg and Plavix 75 mg   - C/w DAPT (Aspirin and Plavix) x 90 days, and then Aspirin monotherapy  - C/w Lipitor 80 mg qd  - increased midodrine 10 mg q8 hrs on 5/1/25  - SBP goal 130-160   - PT/OT/SLP/neuropsych  -The patient requires acute rehab with 3 hours of daily therapies at least 5 out of 7 days and close physiatry follow up.     #Hx of Seizure  - EEG 4/26/25 showed left hemispheric frontotemporally predominant focal cerebral dysfunction can be structural or functional in etiology.  - C/w home Keppra 500 mg bid    #Hx of Parkinson  - Home med: Sinemet 25/100 TID, Sinemet 50/200 qhs   - C/w 25/250 TID and 50/200 qhs   - C/w rivastigmine 9.5 mg patch qd  - C/w entacapone 200 mg QID (7am/12am/5pm/9pm)    #Chronic Thrombocytopenia - resolved  - Plts stable  - Continue to monitor     #RUE superficial thrombophelibits  - RUE swelling  - Duplex showed: IMPRESSION: No evidence of right upper extremity deep venous thrombosis. Superficial thrombophlebitis of the right median antecubital vein  - Warm packs and supportive care    #Misc  -Pain control:  Tylenol  -GI/Bowel Mgmt: senna/Miralax  -Skin: intact  -Diet: minced and moist, thins, CC  On Remeron at bedtime    Precautions / PROPHYLAXIS:    - Falls, aspiration  -DVT Prophylaxis: Chemical: Lovenox 40 mg QD  and Mechanical: SCDs

## 2025-05-05 LAB
ALBUMIN SERPL ELPH-MCNC: 3.5 G/DL — SIGNIFICANT CHANGE UP (ref 3.5–5.2)
ALP SERPL-CCNC: 108 U/L — SIGNIFICANT CHANGE UP (ref 30–115)
ALT FLD-CCNC: 20 U/L — SIGNIFICANT CHANGE UP (ref 0–41)
ANION GAP SERPL CALC-SCNC: 13 MMOL/L — SIGNIFICANT CHANGE UP (ref 7–14)
AST SERPL-CCNC: 61 U/L — HIGH (ref 0–41)
BASOPHILS # BLD AUTO: 0.03 K/UL — SIGNIFICANT CHANGE UP (ref 0–0.2)
BASOPHILS NFR BLD AUTO: 0.3 % — SIGNIFICANT CHANGE UP (ref 0–1)
BILIRUB SERPL-MCNC: 0.7 MG/DL — SIGNIFICANT CHANGE UP (ref 0.2–1.2)
BUN SERPL-MCNC: 13 MG/DL — SIGNIFICANT CHANGE UP (ref 10–20)
CALCIUM SERPL-MCNC: 8.8 MG/DL — SIGNIFICANT CHANGE UP (ref 8.4–10.5)
CHLORIDE SERPL-SCNC: 104 MMOL/L — SIGNIFICANT CHANGE UP (ref 98–110)
CO2 SERPL-SCNC: 25 MMOL/L — SIGNIFICANT CHANGE UP (ref 17–32)
CREAT SERPL-MCNC: 0.9 MG/DL — SIGNIFICANT CHANGE UP (ref 0.7–1.5)
EGFR: 91 ML/MIN/1.73M2 — SIGNIFICANT CHANGE UP
EGFR: 91 ML/MIN/1.73M2 — SIGNIFICANT CHANGE UP
EOSINOPHIL # BLD AUTO: 0.2 K/UL — SIGNIFICANT CHANGE UP (ref 0–0.7)
EOSINOPHIL NFR BLD AUTO: 2.1 % — SIGNIFICANT CHANGE UP (ref 0–8)
GLUCOSE BLDC GLUCOMTR-MCNC: 125 MG/DL — HIGH (ref 70–99)
GLUCOSE BLDC GLUCOMTR-MCNC: 90 MG/DL — SIGNIFICANT CHANGE UP (ref 70–99)
GLUCOSE BLDC GLUCOMTR-MCNC: 95 MG/DL — SIGNIFICANT CHANGE UP (ref 70–99)
GLUCOSE SERPL-MCNC: 83 MG/DL — SIGNIFICANT CHANGE UP (ref 70–99)
HCT VFR BLD CALC: 42 % — SIGNIFICANT CHANGE UP (ref 42–52)
HGB BLD-MCNC: 14.4 G/DL — SIGNIFICANT CHANGE UP (ref 14–18)
IMM GRANULOCYTES NFR BLD AUTO: 0.4 % — HIGH (ref 0.1–0.3)
LYMPHOCYTES # BLD AUTO: 2.02 K/UL — SIGNIFICANT CHANGE UP (ref 1.2–3.4)
LYMPHOCYTES # BLD AUTO: 21.5 % — SIGNIFICANT CHANGE UP (ref 20.5–51.1)
MAGNESIUM SERPL-MCNC: 2.1 MG/DL — SIGNIFICANT CHANGE UP (ref 1.8–2.4)
MCHC RBC-ENTMCNC: 34 PG — HIGH (ref 27–31)
MCHC RBC-ENTMCNC: 34.3 G/DL — SIGNIFICANT CHANGE UP (ref 32–37)
MCV RBC AUTO: 99.1 FL — HIGH (ref 80–94)
MONOCYTES # BLD AUTO: 0.88 K/UL — HIGH (ref 0.1–0.6)
MONOCYTES NFR BLD AUTO: 9.4 % — HIGH (ref 1.7–9.3)
NEUTROPHILS # BLD AUTO: 6.22 K/UL — SIGNIFICANT CHANGE UP (ref 1.4–6.5)
NEUTROPHILS NFR BLD AUTO: 66.3 % — SIGNIFICANT CHANGE UP (ref 42.2–75.2)
NRBC BLD AUTO-RTO: 0 /100 WBCS — SIGNIFICANT CHANGE UP (ref 0–0)
PLATELET # BLD AUTO: 185 K/UL — SIGNIFICANT CHANGE UP (ref 130–400)
PMV BLD: 9.8 FL — SIGNIFICANT CHANGE UP (ref 7.4–10.4)
POTASSIUM SERPL-MCNC: 4.3 MMOL/L — SIGNIFICANT CHANGE UP (ref 3.5–5)
POTASSIUM SERPL-SCNC: 4.3 MMOL/L — SIGNIFICANT CHANGE UP (ref 3.5–5)
PROT SERPL-MCNC: 5.8 G/DL — LOW (ref 6–8)
RBC # BLD: 4.24 M/UL — LOW (ref 4.7–6.1)
RBC # FLD: 12.5 % — SIGNIFICANT CHANGE UP (ref 11.5–14.5)
SODIUM SERPL-SCNC: 142 MMOL/L — SIGNIFICANT CHANGE UP (ref 135–146)
WBC # BLD: 9.39 K/UL — SIGNIFICANT CHANGE UP (ref 4.8–10.8)
WBC # FLD AUTO: 9.39 K/UL — SIGNIFICANT CHANGE UP (ref 4.8–10.8)

## 2025-05-05 RX ADMIN — Medication 2 TABLET(S): at 18:01

## 2025-05-05 RX ADMIN — CLOPIDOGREL BISULFATE 75 MILLIGRAM(S): 75 TABLET, FILM COATED ORAL at 12:27

## 2025-05-05 RX ADMIN — Medication 81 MILLIGRAM(S): at 12:35

## 2025-05-05 RX ADMIN — LEVETIRACETAM 500 MILLIGRAM(S): 10 INJECTION, SOLUTION INTRAVENOUS at 18:02

## 2025-05-05 RX ADMIN — Medication 2 TABLET(S): at 12:33

## 2025-05-05 RX ADMIN — ENTACAPONE 200 MILLIGRAM(S): 200 TABLET, FILM COATED ORAL at 06:07

## 2025-05-05 RX ADMIN — ATORVASTATIN CALCIUM 80 MILLIGRAM(S): 80 TABLET, FILM COATED ORAL at 21:10

## 2025-05-05 RX ADMIN — POLYETHYLENE GLYCOL 3350 17 GRAM(S): 17 POWDER, FOR SOLUTION ORAL at 06:14

## 2025-05-05 RX ADMIN — Medication 3 MILLIGRAM(S): at 21:10

## 2025-05-05 RX ADMIN — POLYETHYLENE GLYCOL 3350 17 GRAM(S): 17 POWDER, FOR SOLUTION ORAL at 18:02

## 2025-05-05 RX ADMIN — RIVASTIGMINE 1 PATCH: 13.3 PATCH, EXTENDED RELEASE TRANSDERMAL at 06:08

## 2025-05-05 RX ADMIN — MIDODRINE HYDROCHLORIDE 10 MILLIGRAM(S): 5 TABLET ORAL at 06:08

## 2025-05-05 RX ADMIN — ENOXAPARIN SODIUM 40 MILLIGRAM(S): 100 INJECTION SUBCUTANEOUS at 13:15

## 2025-05-05 RX ADMIN — RIVASTIGMINE 1 PATCH: 13.3 PATCH, EXTENDED RELEASE TRANSDERMAL at 06:15

## 2025-05-05 RX ADMIN — Medication 2 TABLET(S): at 21:09

## 2025-05-05 RX ADMIN — ENTACAPONE 200 MILLIGRAM(S): 200 TABLET, FILM COATED ORAL at 21:09

## 2025-05-05 RX ADMIN — Medication 1 APPLICATION(S): at 06:14

## 2025-05-05 RX ADMIN — MIDODRINE HYDROCHLORIDE 10 MILLIGRAM(S): 5 TABLET ORAL at 21:10

## 2025-05-05 RX ADMIN — ENTACAPONE 200 MILLIGRAM(S): 200 TABLET, FILM COATED ORAL at 12:27

## 2025-05-05 RX ADMIN — MIRTAZAPINE 7.5 MILLIGRAM(S): 30 TABLET, FILM COATED ORAL at 21:10

## 2025-05-05 RX ADMIN — Medication 1 TABLET(S): at 21:10

## 2025-05-05 RX ADMIN — Medication 2 TABLET(S): at 06:08

## 2025-05-05 RX ADMIN — ENTACAPONE 200 MILLIGRAM(S): 200 TABLET, FILM COATED ORAL at 18:02

## 2025-05-05 RX ADMIN — LEVETIRACETAM 500 MILLIGRAM(S): 10 INJECTION, SOLUTION INTRAVENOUS at 06:08

## 2025-05-06 LAB
GLUCOSE BLDC GLUCOMTR-MCNC: 107 MG/DL — HIGH (ref 70–99)
GLUCOSE BLDC GLUCOMTR-MCNC: 142 MG/DL — HIGH (ref 70–99)
GLUCOSE BLDC GLUCOMTR-MCNC: 222 MG/DL — HIGH (ref 70–99)

## 2025-05-06 RX ADMIN — LEVETIRACETAM 500 MILLIGRAM(S): 10 INJECTION, SOLUTION INTRAVENOUS at 17:12

## 2025-05-06 RX ADMIN — ATORVASTATIN CALCIUM 80 MILLIGRAM(S): 80 TABLET, FILM COATED ORAL at 21:54

## 2025-05-06 RX ADMIN — ENTACAPONE 200 MILLIGRAM(S): 200 TABLET, FILM COATED ORAL at 11:46

## 2025-05-06 RX ADMIN — Medication 3 MILLIGRAM(S): at 21:54

## 2025-05-06 RX ADMIN — Medication 2 TABLET(S): at 17:10

## 2025-05-06 RX ADMIN — POLYETHYLENE GLYCOL 3350 17 GRAM(S): 17 POWDER, FOR SOLUTION ORAL at 17:12

## 2025-05-06 RX ADMIN — Medication 1 TABLET(S): at 21:55

## 2025-05-06 RX ADMIN — CLOPIDOGREL BISULFATE 75 MILLIGRAM(S): 75 TABLET, FILM COATED ORAL at 11:45

## 2025-05-06 RX ADMIN — Medication 81 MILLIGRAM(S): at 11:44

## 2025-05-06 RX ADMIN — Medication 1 APPLICATION(S): at 06:28

## 2025-05-06 RX ADMIN — ENTACAPONE 200 MILLIGRAM(S): 200 TABLET, FILM COATED ORAL at 17:13

## 2025-05-06 RX ADMIN — MIDODRINE HYDROCHLORIDE 10 MILLIGRAM(S): 5 TABLET ORAL at 21:54

## 2025-05-06 RX ADMIN — Medication 2 TABLET(S): at 21:55

## 2025-05-06 RX ADMIN — MIDODRINE HYDROCHLORIDE 10 MILLIGRAM(S): 5 TABLET ORAL at 13:07

## 2025-05-06 RX ADMIN — MIRTAZAPINE 7.5 MILLIGRAM(S): 30 TABLET, FILM COATED ORAL at 21:54

## 2025-05-06 RX ADMIN — RIVASTIGMINE 1 PATCH: 13.3 PATCH, EXTENDED RELEASE TRANSDERMAL at 06:26

## 2025-05-06 RX ADMIN — ENOXAPARIN SODIUM 40 MILLIGRAM(S): 100 INJECTION SUBCUTANEOUS at 13:07

## 2025-05-06 RX ADMIN — ENTACAPONE 200 MILLIGRAM(S): 200 TABLET, FILM COATED ORAL at 06:28

## 2025-05-06 RX ADMIN — ENTACAPONE 200 MILLIGRAM(S): 200 TABLET, FILM COATED ORAL at 21:59

## 2025-05-06 RX ADMIN — Medication 2 TABLET(S): at 11:45

## 2025-05-06 RX ADMIN — POLYETHYLENE GLYCOL 3350 17 GRAM(S): 17 POWDER, FOR SOLUTION ORAL at 06:27

## 2025-05-06 RX ADMIN — LEVETIRACETAM 500 MILLIGRAM(S): 10 INJECTION, SOLUTION INTRAVENOUS at 06:27

## 2025-05-06 RX ADMIN — Medication 2 TABLET(S): at 06:27

## 2025-05-06 NOTE — PROGRESS NOTE ADULT - SUBJECTIVE AND OBJECTIVE BOX
Patient is a 74 y old male who presents with a chief complaint of Rehab for bilateral  acute ischemic strokes  / aphasia, right facial droop /  Parkinson's disease (01 May 2025 11:31)      HPI:  75 y/o M with PMHx of Parkinson's, HTN, DM, gout, seizures on Keppra, h/o ischemic CVA x3 last stroke in January 2025 with residual expressive aphasia/dysarthria/R facial droop, who presented to the ED on 6/23 with severe expressive aphasia, LKW 9:30 pm. NIHSS 10 upon arrival for severe aphasia/dysarthria as pt nonverbal, intermittently able to follow commands, baseline R facial droop, LOC questions. mRs 1. NI alert activated 14:02. CTH negative, CTA H&N showed focal occlusion in the M2/M3 junction of the superior division of the left MCA, new since the prior CTA from 1/20/2023. New multifocal severe stenosis in the left A3 FITO. Stable moderate stenosis in the proximal right M1 MCA. New diffuse moderate to severe irregular stenosis in the bilateral PCAs, left worse than right. CTP with 41 mL of delayed perfusion in the left frontal lobe correlating with the CTA finding. No tnk as Pt out of window. NI actual activated. During angiogram, no occlusion observed to perform thrombectomy on however diffuse ICAD vs. vasospasm was seen in the left ICA for which he received 10mg IA Verapamil with improvement in vessel narrowing.  Post-op NIH 9. Exam improved to NIH 5 w/ BP augmentation. Pt was admitted to NSICU for BP augmentation following stroke  While in the NCC, remained on IV levophed for hemodynamic augmentation, tapering down; NIHSS was stable 5-6. On 4/27: aphasia worsened, stroke code was called. Urgent CTH obtained does not show any changes from the prior scan. Now downgraded to stroke unit on 4/29. Currently, Pt with NIHSS of 9 for LOC, right facial, Mute with severe expressive aphasia, can intermittently follow simple commands but not complex commands. During this hospital course, patient had scattered multiple foci of acute ischemic infarcts in multiple vascular territories (right subinsular region, bilateral thalami, right basal ganglia, bilateral medial temporal lobes and left cerebellum). Etiology likely ICAD as seen on MRI.     Imaging:  CT Head: No evidence of acute transcortical infarct, acute intracranial hemorrhage, or mass effect.  MR Head Non Contrast: Scattered multiple foci of acute infarcts in multiple vascular territories as described. No mass effect or midline shift. Multiple tiny foci showing signal dropout/blooming artifact on susceptibility weighted images at right subinsular region, bilateral thalami, right basal ganglia, bilateral medial temporal lobes and left cerebellum, probably representing microhemorrhages.  CT Angio Head and Neck: Focal occlusion in the M2/M3 junction of the superior division of the left MCA, new since the prior CTA from 1/20/2023. New multifocal severe stenosis in the left A3 FITO. Stable moderate stenosis in the proximal right M1 MCA. New diffuse moderate to severe irregular stenosis in the bilateral PCAs, left worse than right. Stable severe left/moderate right atherosclerotic stenosis in the V4 vertebral arteries.    Prior to admission, the patient was independent in ADLs and ambulation without an assistive device living in an assited living facility. Patient now requires assistance with ambulation and ADLs due to acute infarcts in multiple vascular territories (right subinsular region, bilateral thalami, right basal ganglia, bilateral medial temporal lobes and left cerebellum). Therefore, there is a significant functional decline from baseline. Patients also with medical comorbidities of seizures, parkinsons, HTN, DM, requiring medication management and monitoring of vitals by Physiatry team and nursing. To return home it is in the patient’s best interest to have acute inpatient rehabilitative services to undergo intensive interdisciplinary therapy. Furthermore, the patient is motivated and is able to tolerate up to 3 hours of daily therapy for 5-6 days a week for a total of 15 hours a week. Evaluated by Physiatry and deemed to be an excellent candidate for admission to  for acute inpatient rehab. Admitted to Acute Rehab on 5/1/25.    Pt seen and examined by PT, OT, ST and Physiatry and is currently functioning at modA bed mobility, maxA transfers, ambulates 5 ft RW modA, then 35 ft RW Reza, dressing with modA. On minced and moist diet for dysphagia.    LS: lives in assisted living facility with private A  PLOF: unable to confirm due to patient aphasia, as of 2/2025 discharged from acute rehab previously functioning at bed mobility supervision; Transfers sup/touchA-partialA; ambulation 150 ft x2/RW/touchA; UBD partialA; LBD partialA (01 May 2025 11:31)    TODAY'S SUBJECTIVE & REVIEW OF SYMPTOMS:    Patient was examined by me yesterday, but note was not entered correctly and not in chart.   No new problems yesterday or today. Calm, cooperating. Limited by aphasia but cooperative in therapies.   Appears comfortable. Vitals and labs reviewed.    Review of Systems: Unable to obtain due to mutism and inconsistent head nodding to questioning.    PHYSICAL EXAM    Vital Signs Last 24 Hrs  T(C): 36.3 (06 May 2025 13:05), Max: 36.4 (05 May 2025 21:55)  T(F): 97.3 (06 May 2025 13:05), Max: 97.6 (05 May 2025 21:55)  HR: 80 (06 May 2025 13:38) (54 - 80)  BP: 142/72 (06 May 2025 13:38) (119/75 - 177/83)  BP(mean): --  RR: 18 (06 May 2025 13:05) (18 - 18)  SpO2: 98% (06 May 2025 13:05) (97% - 99%)    Parameters below as of 06 May 2025 13:05  Patient On (Oxygen Delivery Method): room air        General:[ x  ] NAD, Resting Comfortable,   [   ] other:                                HEENT: [ x  ] NC/AT, EOMI, PERRL , Normal Conjunctiva,   [   ] other:  Cardio: [  x ] RRR, no murmur,   [   ] other:                              Pulm: [  x ] No Respiratory Distress,  Lungs CTAB,   [   ] other:                       Abdomen: [ x  ]ND/NT, Soft,   [   ] other:    : [ x  ] NO LAM CATHETER, [   ] LAM CATHETER- no meatal tear, no discharge, [   ] other:                                            MSK: [  x ] No joint swelling, Full ROM,   [   ] other:                                         Ext: [ x  ]No C/C/E, No calf tenderness,   [ X  ]other:  RUE edema  Skin: [ x  ]intact,   [   ] other:                                                                   Neurological Examination:  Cognitive: [    ] AAO x 3,   [  x  ]  other: awake, alert, can nod head but inconsistent with correct answer and follow simple commands less than 50% of time                                                                      Attention:  [ X   ] intact,   [    ]  other: can attend to interviewer on both right and left sides. Needs redirection.                           Memory: [    ] intact,    [  X  ]  other: unable to assess 2/2 aphasia   Mood/Affect: [  X  ] wnl,    [    ]  other:                                                                             Communication: [    ]Fluent, no dysarthria, following commands:  [  X  ] other: mute, expressive>receptive aphasia, follows simple commands less than 50% of time with frequent redirection   CN II - XII:  [     ] intact,  [  X  ] other: Right lower facial droop, remainder grossly intact visual fields appear intact eyes tracking to stimuli     MEDICATIONS  (STANDING):  aspirin  chewable 81 milliGRAM(s) Oral daily  atorvastatin 80 milliGRAM(s) Oral at bedtime  carbidopa/levodopa  25/250 2 Tablet(s) Oral <User Schedule>  carbidopa/levodopa CR 50/200 1 Tablet(s) Oral <User Schedule>  chlorhexidine 2% Cloths 1 Application(s) Topical <User Schedule>  clopidogrel Tablet 75 milliGRAM(s) Oral daily  enoxaparin Injectable 40 milliGRAM(s) SubCutaneous <User Schedule>  entacapone 200 milliGRAM(s) Oral <User Schedule>  levETIRAcetam 500 milliGRAM(s) Oral two times a day  melatonin 3 milliGRAM(s) Oral at bedtime  midodrine 10 milliGRAM(s) Oral every 8 hours  mirtazapine 7.5 milliGRAM(s) Oral at bedtime  polyethylene glycol 3350 17 Gram(s) Oral two times a day  rivastigmine patch  9.5 mG/24 Hr(s) 1 Patch Transdermal every 24 hours  senna 2 Tablet(s) Oral at bedtime    MEDICATIONS  (PRN):  acetaminophen     Tablet .. 650 milliGRAM(s) Oral every 6 hours PRN Temp greater or equal to 38C (100.4F), Mild Pain (1 - 3)      RECENT LABS/IMAGING                          14.4   9.39  )-----------( 185      ( 05 May 2025 06:49 )             42.0     05-05    142  |  104  |  13  ----------------------------<  83  4.3   |  25  |  0.9    Ca    8.8      05 May 2025 06:49  Mg     2.1     05-05    TPro  5.8[L]  /  Alb  3.5  /  TBili  0.7  /  DBili  x   /  AST  61[H]  /  ALT  20  /  AlkPhos  108  05-05      POCT Blood Glucose.: 222 mg/dL (05-06-25 @ 10:52)  POCT Blood Glucose.: 107 mg/dL (05-06-25 @ 07:22)  POCT Blood Glucose.: 90 mg/dL (05-05-25 @ 16:48)  POCT Blood Glucose.: 125 mg/dL (05-05-25 @ 11:26)  POCT Blood Glucose.: 95 mg/dL (05-05-25 @ 07:23)  POCT Blood Glucose.: 141 mg/dL (05-04-25 @ 16:31)  POCT Blood Glucose.: 91 mg/dL (05-04-25 @ 07:56)  POCT Blood Glucose.: 126 mg/dL (05-03-25 @ 16:56)  POCT Blood Glucose.: 150 mg/dL (05-03-25 @ 11:09)  POCT Blood Glucose.: 101 mg/dL (05-03-25 @ 07:27)

## 2025-05-06 NOTE — PROGRESS NOTE ADULT - ASSESSMENT
Patient is a 75 y/o M with PMHx of Parkinson's, HTN, DM, gout, seizures on keppra, prior ischemic stroke x3 most recently January 2025 with residual expressive aphasia/dysarthria/R facial droop, who presented to the ED on 6/23 with severe expressive aphasia, NIHSS 10 upon arrival for severe aphasia/dysarthria as pt nonverbal, intermittently able to follow commands, baseline R facial droop, LOC questions. mRs 1. NI alert activated, CTH negative, CTA H&N showed focal occlusion in the M2/M3 junction of the superior division of the left MCA, new since the prior CTA from 1/20/2023. New multifocal severe stenosis in the left A3 FITO. Stable moderate stenosis in the proximal right M1 MCA. New diffuse moderate to severe irregular stenosis in the bilateral PCAs, left worse than right. CTP with 41 mL of delayed perfusion in the left frontal lobe correlating with the CTA finding. No tnk as Pt out of window. NI activated, During angiogram, no occlusion observed to perform thrombectomy on however diffuse ICAD vs. vasospasm was seen in the left ICA for which he received 10mg IA Verapamil with improvement in vessel narrowing.  Post-op NIH 9. Exam improved to NIH 5 w/ BP augmentation. Pt was admitted to NSICU for BP augmentation following stroke  While in the NCC, remained on IV levophed for hemodynamic augmentation, tapering down; NIHSS was stable 5-6. On 4/27: aphasia worsened, stroke code was called. Urgent CTH obtained does not show any changes from the prior scan. Now downgraded to stroke unit on 4/29. Currently, Pt with NIHSS of 9 for LOC, right facial, Mute with severe expressive aphasia, can intermittently follow simple commands but not complex commands. Patient hemodynamically stable for admission to acute rehab on 5/1/25.     #Rehab for scattered multiple foci of acute ischemic infarcts in multiple vascular territories (right subinsular region, bilateral thalami, right basal ganglia, bilateral medial temporal lobes and left cerebellum) causing worsened aphasia, right facial droop, gait abnormality and functional decline  #History for L corona radiata stroke in January 2025 with residual expressive aphasia/dysarthria/R facial droop.  - Etiology likely ICAD  - CTA on arrival with left M2/M3 focal occlusion. Stable focal moderate stenosis of the R V4 segment and L V4.  - CT perfusion: with 41 mL of delayed perfusion in the left frontal lobe correlating with the CTA finding  - Cerebral Angiogram: no occlusion noted but possible vasospasm. s/p 10 mg IA verapamil   - ILR interrogated by EP: No AFib (ILR placed in 2025)   - TTE:  EF of 69 %., grade 1 diastolic dysfunction. Normal atria   - PRU on Plavix: 151- 4/27/25   - A1c 5.6,  LDL 44, TSH 0.88  - Home antiplatelets: Aspirin 81 mg and Plavix 75 mg   - C/w DAPT (Aspirin and Plavix) x 90 days, and then Aspirin monotherapy  - C/w Lipitor 80 mg qd  - continue midodrine 10 mg q8 hrs  - SBP goal 130-160   - PT/OT/SLP/neuropsych  -The patient requires acute rehab with 3 hours of daily therapies at least 5 out of 7 days and close physiatry follow up.     #Hx of Seizure  - EEG 4/26/25 showed left hemispheric frontotemporally predominant focal cerebral dysfunction can be structural or functional in etiology.  - C/w home Keppra 500 mg bid  - asymptomatic    #Hx of Parkinson's Syndrome  - Home med: Sinemet 25/100 TID, Sinemet 50/200 qhs   - C/w 25/250 BID and 50/200 qhs   - C/w rivastigmine 9.5 mg patch qd  - C/w entacapone 200 mg QID (7am/12am/5pm/9pm)    #Chronic Thrombocytopenia - resolved  - Plts stable  - Continue to monitor     #RUE superficial thrombophelibits  - RUE swelling  - Duplex showed: IMPRESSION: No evidence of right upper extremity deep venous thrombosis. Superficial thrombophlebitis of the right median antecubital vein  - Warm packs and supportive care    #Misc  -Pain control:  Tylenol  -GI/Bowel Mgmt: senna/Miralax  -Skin: intact  -Diet: minced and moist, thins, CC  On Remeron at bedtime    Precautions / PROPHYLAXIS:    - Falls, aspiration  -DVT Prophylaxis: Lovenox 40 mg QD

## 2025-05-07 LAB
GLUCOSE BLDC GLUCOMTR-MCNC: 129 MG/DL — HIGH (ref 70–99)
GLUCOSE BLDC GLUCOMTR-MCNC: 139 MG/DL — HIGH (ref 70–99)
GLUCOSE BLDC GLUCOMTR-MCNC: 89 MG/DL — SIGNIFICANT CHANGE UP (ref 70–99)

## 2025-05-07 PROCEDURE — 74230 X-RAY XM SWLNG FUNCJ C+: CPT | Mod: 26

## 2025-05-07 RX ADMIN — Medication 81 MILLIGRAM(S): at 13:21

## 2025-05-07 RX ADMIN — MIDODRINE HYDROCHLORIDE 10 MILLIGRAM(S): 5 TABLET ORAL at 06:25

## 2025-05-07 RX ADMIN — MIRTAZAPINE 7.5 MILLIGRAM(S): 30 TABLET, FILM COATED ORAL at 21:30

## 2025-05-07 RX ADMIN — Medication 2 TABLET(S): at 06:24

## 2025-05-07 RX ADMIN — ENTACAPONE 200 MILLIGRAM(S): 200 TABLET, FILM COATED ORAL at 21:31

## 2025-05-07 RX ADMIN — ENTACAPONE 200 MILLIGRAM(S): 200 TABLET, FILM COATED ORAL at 13:21

## 2025-05-07 RX ADMIN — Medication 3 MILLIGRAM(S): at 21:31

## 2025-05-07 RX ADMIN — Medication 1 APPLICATION(S): at 06:25

## 2025-05-07 RX ADMIN — ENTACAPONE 200 MILLIGRAM(S): 200 TABLET, FILM COATED ORAL at 06:24

## 2025-05-07 RX ADMIN — POLYETHYLENE GLYCOL 3350 17 GRAM(S): 17 POWDER, FOR SOLUTION ORAL at 06:28

## 2025-05-07 RX ADMIN — LEVETIRACETAM 500 MILLIGRAM(S): 10 INJECTION, SOLUTION INTRAVENOUS at 17:12

## 2025-05-07 RX ADMIN — POLYETHYLENE GLYCOL 3350 17 GRAM(S): 17 POWDER, FOR SOLUTION ORAL at 17:12

## 2025-05-07 RX ADMIN — CLOPIDOGREL BISULFATE 75 MILLIGRAM(S): 75 TABLET, FILM COATED ORAL at 13:20

## 2025-05-07 RX ADMIN — MIDODRINE HYDROCHLORIDE 10 MILLIGRAM(S): 5 TABLET ORAL at 21:31

## 2025-05-07 RX ADMIN — ATORVASTATIN CALCIUM 80 MILLIGRAM(S): 80 TABLET, FILM COATED ORAL at 21:31

## 2025-05-07 RX ADMIN — Medication 2 TABLET(S): at 21:30

## 2025-05-07 RX ADMIN — RIVASTIGMINE 1 PATCH: 13.3 PATCH, EXTENDED RELEASE TRANSDERMAL at 06:24

## 2025-05-07 RX ADMIN — MIDODRINE HYDROCHLORIDE 10 MILLIGRAM(S): 5 TABLET ORAL at 13:21

## 2025-05-07 RX ADMIN — Medication 2 TABLET(S): at 13:20

## 2025-05-07 RX ADMIN — LEVETIRACETAM 500 MILLIGRAM(S): 10 INJECTION, SOLUTION INTRAVENOUS at 06:25

## 2025-05-07 RX ADMIN — Medication 2 TABLET(S): at 17:13

## 2025-05-07 RX ADMIN — ENOXAPARIN SODIUM 40 MILLIGRAM(S): 100 INJECTION SUBCUTANEOUS at 13:20

## 2025-05-07 RX ADMIN — ENTACAPONE 200 MILLIGRAM(S): 200 TABLET, FILM COATED ORAL at 17:13

## 2025-05-07 RX ADMIN — Medication 1 TABLET(S): at 21:31

## 2025-05-07 NOTE — PROGRESS NOTE ADULT - ASSESSMENT
Patient is a 73 y/o M with PMHx of Parkinson's, HTN, DM, gout, seizures on keppra, prior ischemic stroke x3 most recently January 2025 with residual expressive aphasia/dysarthria/R facial droop, who presented to the ED on 6/23 with severe expressive aphasia, NIHSS 10 upon arrival for severe aphasia/dysarthria as pt nonverbal, intermittently able to follow commands, baseline R facial droop, LOC questions. mRs 1. NI alert activated, CTH negative, CTA H&N showed focal occlusion in the M2/M3 junction of the superior division of the left MCA, new since the prior CTA from 1/20/2023. New multifocal severe stenosis in the left A3 FITO. Stable moderate stenosis in the proximal right M1 MCA. New diffuse moderate to severe irregular stenosis in the bilateral PCAs, left worse than right. CTP with 41 mL of delayed perfusion in the left frontal lobe correlating with the CTA finding. No tnk as Pt out of window. NI activated, During angiogram, no occlusion observed to perform thrombectomy on however diffuse ICAD vs. vasospasm was seen in the left ICA for which he received 10mg IA Verapamil with improvement in vessel narrowing.  Post-op NIH 9. Exam improved to NIH 5 w/ BP augmentation. Pt was admitted to NSICU for BP augmentation following stroke  While in the NCC, remained on IV levophed for hemodynamic augmentation, tapering down; NIHSS was stable 5-6. On 4/27: aphasia worsened, stroke code was called. Urgent CTH obtained does not show any changes from the prior scan. Now downgraded to stroke unit on 4/29. Currently, Pt with NIHSS of 9 for LOC, right facial, Mute with severe expressive aphasia, can intermittently follow simple commands but not complex commands. Patient hemodynamically stable for admission to acute rehab on 5/1/25.     #Rehab for scattered multiple foci of acute ischemic infarcts in multiple vascular territories (right subinsular region, bilateral thalami, right basal ganglia, bilateral medial temporal lobes and left cerebellum) causing worsened aphasia, right facial droop, gait abnormality and functional decline  #History for L corona radiata stroke in January 2025 with residual expressive aphasia/dysarthria/R facial droop.  - Etiology likely ICAD  - CTA on arrival with left M2/M3 focal occlusion. Stable focal moderate stenosis of the R V4 segment and L V4.  - CT perfusion: with 41 mL of delayed perfusion in the left frontal lobe correlating with the CTA finding  - Cerebral Angiogram: no occlusion noted but possible vasospasm. s/p 10 mg IA verapamil   - ILR interrogated by EP: No AFib (ILR placed in 2025)   - TTE:  EF of 69 %., grade 1 diastolic dysfunction. Normal atria   - PRU on Plavix: 151- 4/27/25   - A1c 5.6,  LDL 44, TSH 0.88  - Home antiplatelets: Aspirin 81 mg and Plavix 75 mg   - C/w DAPT (Aspirin and Plavix) x 90 days, and then Aspirin monotherapy  - C/w Lipitor 80 mg qd  - continue midodrine 10 mg q8 hrs  - SBP goal 130-160   - PT/OT/SLP/neuropsych  -The patient requires acute rehab with 3 hours of daily therapies at least 5 out of 7 days and close physiatry follow up.   - PVR Bladder scan q8  - 5/7 - underwent MBS, continue current diet- minced and moist, thins    #Hx of Seizure  - EEG 4/26/25 showed left hemispheric frontotemporally predominant focal cerebral dysfunction can be structural or functional in etiology.  - C/w home Keppra 500 mg bid  - asymptomatic    #Hx of Parkinson's Syndrome  - Home med: Sinemet 25/100 TID, Sinemet 50/200 qhs   - C/w 25/250 BID and 50/200 qhs   - C/w rivastigmine 9.5 mg patch qd  - C/w entacapone 200 mg QID (7am/12am/5pm/9pm)    #Chronic Thrombocytopenia - resolved  - Plts stable  - Continue to monitor     #RUE superficial thrombophelibits  - RUE swelling  - Duplex showed: IMPRESSION: No evidence of right upper extremity deep venous thrombosis. Superficial thrombophlebitis of the right median antecubital vein  - Warm packs and supportive care    #Misc  -Pain control:  Tylenol  -GI/Bowel Mgmt: senna/Miralax  -Skin: intact  -Diet: minced and moist, thins, CC  On Remeron at bedtime    Precautions / PROPHYLAXIS:    - Falls, aspiration  -DVT Prophylaxis: Lovenox 40 mg QD       Patient is a 75 y/o M with PMHx of Parkinson's, HTN, DM, gout, seizures on keppra, prior ischemic stroke x3 most recently January 2025 with residual expressive aphasia/dysarthria/R facial droop, who presented to the ED on 6/23 with severe expressive aphasia, NIHSS 10 upon arrival for severe aphasia/dysarthria as pt nonverbal, intermittently able to follow commands, baseline R facial droop, LOC questions. mRs 1. NI alert activated, CTH negative, CTA H&N showed focal occlusion in the M2/M3 junction of the superior division of the left MCA, new since the prior CTA from 1/20/2023. New multifocal severe stenosis in the left A3 FITO. Stable moderate stenosis in the proximal right M1 MCA. New diffuse moderate to severe irregular stenosis in the bilateral PCAs, left worse than right. CTP with 41 mL of delayed perfusion in the left frontal lobe correlating with the CTA finding. No tnk as Pt out of window. NI activated, During angiogram, no occlusion observed to perform thrombectomy on however diffuse ICAD vs. vasospasm was seen in the left ICA for which he received 10mg IA Verapamil with improvement in vessel narrowing.  Post-op NIH 9. Exam improved to NIH 5 w/ BP augmentation. Pt was admitted to NSICU for BP augmentation following stroke  While in the NCC, remained on IV levophed for hemodynamic augmentation, tapering down; NIHSS was stable 5-6. On 4/27: aphasia worsened, stroke code was called. Urgent CTH obtained does not show any changes from the prior scan. Now downgraded to stroke unit on 4/29. Currently, Pt with NIHSS of 9 for LOC, right facial, Mute with severe expressive aphasia, can intermittently follow simple commands but not complex commands. Patient hemodynamically stable for admission to acute rehab on 5/1/25.

## 2025-05-07 NOTE — PROGRESS NOTE ADULT - SUBJECTIVE AND OBJECTIVE BOX
Patient is a 74 y old male who presents with a chief complaint of Rehab for bilateral  acute ischemic strokes  / aphasia, right facial droop /  Parkinson's disease (01 May 2025 11:31)      HPI:  75 y/o M with PMHx of Parkinson's, HTN, DM, gout, seizures on Keppra, h/o ischemic CVA x3 last stroke in January 2025 with residual expressive aphasia/dysarthria/R facial droop, who presented to the ED on 6/23 with severe expressive aphasia, LKW 9:30 pm. NIHSS 10 upon arrival for severe aphasia/dysarthria as pt nonverbal, intermittently able to follow commands, baseline R facial droop, LOC questions. mRs 1. NI alert activated 14:02. CTH negative, CTA H&N showed focal occlusion in the M2/M3 junction of the superior division of the left MCA, new since the prior CTA from 1/20/2023. New multifocal severe stenosis in the left A3 FITO. Stable moderate stenosis in the proximal right M1 MCA. New diffuse moderate to severe irregular stenosis in the bilateral PCAs, left worse than right. CTP with 41 mL of delayed perfusion in the left frontal lobe correlating with the CTA finding. No tnk as Pt out of window. NI actual activated. During angiogram, no occlusion observed to perform thrombectomy on however diffuse ICAD vs. vasospasm was seen in the left ICA for which he received 10mg IA Verapamil with improvement in vessel narrowing.  Post-op NIH 9. Exam improved to NIH 5 w/ BP augmentation. Pt was admitted to NSICU for BP augmentation following stroke  While in the NCC, remained on IV levophed for hemodynamic augmentation, tapering down; NIHSS was stable 5-6. On 4/27: aphasia worsened, stroke code was called. Urgent CTH obtained does not show any changes from the prior scan. Now downgraded to stroke unit on 4/29. Currently, Pt with NIHSS of 9 for LOC, right facial, Mute with severe expressive aphasia, can intermittently follow simple commands but not complex commands. During this hospital course, patient had scattered multiple foci of acute ischemic infarcts in multiple vascular territories (right subinsular region, bilateral thalami, right basal ganglia, bilateral medial temporal lobes and left cerebellum). Etiology likely ICAD as seen on MRI.     Imaging:  CT Head: No evidence of acute transcortical infarct, acute intracranial hemorrhage, or mass effect.  MR Head Non Contrast: Scattered multiple foci of acute infarcts in multiple vascular territories as described. No mass effect or midline shift. Multiple tiny foci showing signal dropout/blooming artifact on susceptibility weighted images at right subinsular region, bilateral thalami, right basal ganglia, bilateral medial temporal lobes and left cerebellum, probably representing microhemorrhages.  CT Angio Head and Neck: Focal occlusion in the M2/M3 junction of the superior division of the left MCA, new since the prior CTA from 1/20/2023. New multifocal severe stenosis in the left A3 FITO. Stable moderate stenosis in the proximal right M1 MCA. New diffuse moderate to severe irregular stenosis in the bilateral PCAs, left worse than right. Stable severe left/moderate right atherosclerotic stenosis in the V4 vertebral arteries.    Prior to admission, the patient was independent in ADLs and ambulation without an assistive device living in an assited living facility. Patient now requires assistance with ambulation and ADLs due to acute infarcts in multiple vascular territories (right subinsular region, bilateral thalami, right basal ganglia, bilateral medial temporal lobes and left cerebellum). Therefore, there is a significant functional decline from baseline. Patients also with medical comorbidities of seizures, parkinsons, HTN, DM, requiring medication management and monitoring of vitals by Physiatry team and nursing. To return home it is in the patient’s best interest to have acute inpatient rehabilitative services to undergo intensive interdisciplinary therapy. Furthermore, the patient is motivated and is able to tolerate up to 3 hours of daily therapy for 5-6 days a week for a total of 15 hours a week. Evaluated by Physiatry and deemed to be an excellent candidate for admission to  for acute inpatient rehab. Admitted to Acute Rehab on 5/1/25.    Pt seen and examined by PT, OT, ST and Physiatry and is currently functioning at modA bed mobility, maxA transfers, ambulates 5 ft RW modA, then 35 ft RW Reza, dressing with modA. On minced and moist diet for dysphagia.    LS: lives in assisted living facility with private A  PLOF: unable to confirm due to patient aphasia, as of 2/2025 discharged from acute rehab previously functioning at bed mobility supervision; Transfers sup/touchA-partialA; ambulation 150 ft x2/RW/touchA; UBD partialA; LBD partialA (01 May 2025 11:31)    TODAY'S SUBJECTIVE & REVIEW OF SYMPTOMS:    Patient was examined by me yesterday, but note was not entered correctly and not in chart.   Patient seen this morning while recieving PT, tolerating therapy well. Underwent MBS, to continue current diet.   Straight cath x1 today with 800cc, PVR bladder scan q8 in setting of limited communication  VSS    Review of Systems: Unable to obtain due to mutism and inconsistent head nodding to questioning.    PHYSICAL EXAM    Vital Signs Last 24 Hrs  T(C): 36.4 (05-07-25 @ 06:00), Max: 36.7 (05-06-25 @ 19:47)  T(F): 97.5 (05-07-25 @ 06:00), Max: 98 (05-06-25 @ 19:47)  HR: 68 (05-07-25 @ 06:00) (68 - 86)  BP: 138/83 (05-07-25 @ 06:00) (136/86 - 142/72)  BP(mean): 102 (05-06-25 @ 19:47) (102 - 102)  RR: 18 (05-07-25 @ 06:00) (18 - 18)  SpO2: 96% (05-07-25 @ 06:00) (96% - 98%)      General:[ x  ] NAD, Resting Comfortable,   [   ] other:                                HEENT: [ x  ] NC/AT, EOMI, PERRL , Normal Conjunctiva,   [   ] other:  Cardio: [  x ] RRR, no murmur,   [   ] other:                              Pulm: [  x ] No Respiratory Distress,  Lungs CTAB,   [   ] other:                       Abdomen: [ x  ]ND/NT, Soft,   [   ] other:    : [ x  ] NO LAM CATHETER, [   ] LAM CATHETER- no meatal tear, no discharge, [   ] other:                                            MSK: [  x ] No joint swelling, Full ROM,   [   ] other:                                         Ext: [ x  ]No C/C/E, No calf tenderness,   [ X  ]other:  RUE edema  Skin: [ x  ]intact,   [   ] other:                                                                   Neurological Examination:  Cognitive: [    ] AAO x 3,   [  x  ]  other: awake, alert, can nod head but inconsistent with correct answer and follow simple commands less than 50% of time                                                                      Attention:  [ X   ] intact,   [    ]  other: can attend to interviewer on both right and left sides. Needs redirection.                           Memory: [    ] intact,    [  X  ]  other: unable to assess 2/2 aphasia   Mood/Affect: [  X  ] wnl,    [    ]  other:                                                                             Communication: [    ]Fluent, no dysarthria, following commands:  [  X  ] other: mute, expressive>receptive aphasia, follows simple commands less than 50% of time with frequent redirection   CN II - XII:  [     ] intact,  [  X  ] other: Right lower facial droop, remainder grossly intact visual fields appear intact eyes tracking to stimuli     MEDICATIONS  (STANDING):  aspirin  chewable 81 milliGRAM(s) Oral daily  atorvastatin 80 milliGRAM(s) Oral at bedtime  carbidopa/levodopa  25/250 2 Tablet(s) Oral <User Schedule>  carbidopa/levodopa CR 50/200 1 Tablet(s) Oral <User Schedule>  chlorhexidine 2% Cloths 1 Application(s) Topical <User Schedule>  clopidogrel Tablet 75 milliGRAM(s) Oral daily  enoxaparin Injectable 40 milliGRAM(s) SubCutaneous <User Schedule>  entacapone 200 milliGRAM(s) Oral <User Schedule>  levETIRAcetam 500 milliGRAM(s) Oral two times a day  melatonin 3 milliGRAM(s) Oral at bedtime  midodrine 10 milliGRAM(s) Oral every 8 hours  mirtazapine 7.5 milliGRAM(s) Oral at bedtime  polyethylene glycol 3350 17 Gram(s) Oral two times a day  rivastigmine patch  9.5 mG/24 Hr(s) 1 Patch Transdermal every 24 hours  senna 2 Tablet(s) Oral at bedtime    MEDICATIONS  (PRN):  acetaminophen     Tablet .. 650 milliGRAM(s) Oral every 6 hours PRN Temp greater or equal to 38C (100.4F), Mild Pain (1 - 3)      POCT Blood Glucose.: 129 mg/dL (05-07-25 @ 11:04)  POCT Blood Glucose.: 89 mg/dL (05-07-25 @ 07:25)  POCT Blood Glucose.: 142 mg/dL (05-06-25 @ 16:50)  POCT Blood Glucose.: 222 mg/dL (05-06-25 @ 10:52)  POCT Blood Glucose.: 107 mg/dL (05-06-25 @ 07:22)  POCT Blood Glucose.: 90 mg/dL (05-05-25 @ 16:48)  POCT Blood Glucose.: 125 mg/dL (05-05-25 @ 11:26)  POCT Blood Glucose.: 95 mg/dL (05-05-25 @ 07:23)  POCT Blood Glucose.: 141 mg/dL (05-04-25 @ 16:31)  POCT Blood Glucose.: 91 mg/dL (05-04-25 @ 07:56)  POCT Blood Glucose.: 126 mg/dL (05-03-25 @ 16:56)   Patient is a 74 y old male who presents with a chief complaint of Rehab for bilateral  acute ischemic strokes  / aphasia, right facial droop /  Parkinson's disease (01 May 2025 11:31)      HPI:  75 y/o M with PMHx of Parkinson's, HTN, DM, gout, seizures on Keppra, h/o ischemic CVA x3 last stroke in January 2025 with residual expressive aphasia/dysarthria/R facial droop, who presented to the ED on 6/23 with severe expressive aphasia, LKW 9:30 pm. NIHSS 10 upon arrival for severe aphasia/dysarthria as pt nonverbal, intermittently able to follow commands, baseline R facial droop, LOC questions. mRs 1. NI alert activated 14:02. CTH negative, CTA H&N showed focal occlusion in the M2/M3 junction of the superior division of the left MCA, new since the prior CTA from 1/20/2023. New multifocal severe stenosis in the left A3 FITO. Stable moderate stenosis in the proximal right M1 MCA. New diffuse moderate to severe irregular stenosis in the bilateral PCAs, left worse than right. CTP with 41 mL of delayed perfusion in the left frontal lobe correlating with the CTA finding. No tnk as Pt out of window. NI actual activated. During angiogram, no occlusion observed to perform thrombectomy on however diffuse ICAD vs. vasospasm was seen in the left ICA for which he received 10mg IA Verapamil with improvement in vessel narrowing.  Post-op NIH 9. Exam improved to NIH 5 w/ BP augmentation. Pt was admitted to NSICU for BP augmentation following stroke  While in the NCC, remained on IV levophed for hemodynamic augmentation, tapering down; NIHSS was stable 5-6. On 4/27: aphasia worsened, stroke code was called. Urgent CTH obtained does not show any changes from the prior scan. Now downgraded to stroke unit on 4/29. Currently, Pt with NIHSS of 9 for LOC, right facial, Mute with severe expressive aphasia, can intermittently follow simple commands but not complex commands. During this hospital course, patient had scattered multiple foci of acute ischemic infarcts in multiple vascular territories (right subinsular region, bilateral thalami, right basal ganglia, bilateral medial temporal lobes and left cerebellum). Etiology likely ICAD as seen on MRI.     Imaging:  CT Head: No evidence of acute transcortical infarct, acute intracranial hemorrhage, or mass effect.  MR Head Non Contrast: Scattered multiple foci of acute infarcts in multiple vascular territories as described. No mass effect or midline shift. Multiple tiny foci showing signal dropout/blooming artifact on susceptibility weighted images at right subinsular region, bilateral thalami, right basal ganglia, bilateral medial temporal lobes and left cerebellum, probably representing microhemorrhages.  CT Angio Head and Neck: Focal occlusion in the M2/M3 junction of the superior division of the left MCA, new since the prior CTA from 1/20/2023. New multifocal severe stenosis in the left A3 FITO. Stable moderate stenosis in the proximal right M1 MCA. New diffuse moderate to severe irregular stenosis in the bilateral PCAs, left worse than right. Stable severe left/moderate right atherosclerotic stenosis in the V4 vertebral arteries.    Prior to admission, the patient was independent in ADLs and ambulation without an assistive device living in an assited living facility. Patient now requires assistance with ambulation and ADLs due to acute infarcts in multiple vascular territories (right subinsular region, bilateral thalami, right basal ganglia, bilateral medial temporal lobes and left cerebellum). Therefore, there is a significant functional decline from baseline. Patients also with medical comorbidities of seizures, parkinsons, HTN, DM, requiring medication management and monitoring of vitals by Physiatry team and nursing. To return home it is in the patient’s best interest to have acute inpatient rehabilitative services to undergo intensive interdisciplinary therapy. Furthermore, the patient is motivated and is able to tolerate up to 3 hours of daily therapy for 5-6 days a week for a total of 15 hours a week. Evaluated by Physiatry and deemed to be an excellent candidate for admission to  for acute inpatient rehab. Admitted to Acute Rehab on 5/1/25.    Pt seen and examined by PT, OT, ST and Physiatry and is currently functioning at modA bed mobility, maxA transfers, ambulates 5 ft RW modA, then 35 ft RW Reza, dressing with modA. On minced and moist diet for dysphagia.    LS: lives in assisted living facility with private A  PLOF: unable to confirm due to patient aphasia, as of 2/2025 discharged from acute rehab previously functioning at bed mobility supervision; Transfers sup/touchA-partialA; ambulation 150 ft x2/RW/touchA; UBD partialA; LBD partialA (01 May 2025 11:31)    TODAY'S SUBJECTIVE & REVIEW OF SYMPTOMS:    Patient was examined by me yesterday, but note was not entered correctly and not in chart.   Patient seen this morning while recieving PT, tolerating therapy well. Underwent MBS, to continue current diet.   Straight cath x1 today with 800cc, PVR bladder scan q8 in setting of limited communication  VSS    CLOF:  eating - partial-modA  UBD - partialA  LBD - partialA  transfers- partial-modA  toilet- partial-modA  mobility- RW 50' partial A    Review of Systems: Unable to obtain due to mutism and inconsistent head nodding to questioning.    PHYSICAL EXAM    Vital Signs Last 24 Hrs  T(C): 36.4 (05-07-25 @ 06:00), Max: 36.7 (05-06-25 @ 19:47)  T(F): 97.5 (05-07-25 @ 06:00), Max: 98 (05-06-25 @ 19:47)  HR: 68 (05-07-25 @ 06:00) (68 - 86)  BP: 138/83 (05-07-25 @ 06:00) (136/86 - 142/72)  BP(mean): 102 (05-06-25 @ 19:47) (102 - 102)  RR: 18 (05-07-25 @ 06:00) (18 - 18)  SpO2: 96% (05-07-25 @ 06:00) (96% - 98%)      General:[ x  ] NAD, Resting Comfortable,   [   ] other:                                HEENT: [ x  ] NC/AT, EOMI, PERRL , Normal Conjunctiva,   [   ] other:  Cardio: [  x ] RRR, no murmur,   [   ] other:                              Pulm: [  x ] No Respiratory Distress,  Lungs CTAB,   [   ] other:                       Abdomen: [ x  ]ND/NT, Soft,   [   ] other:    : [ x  ] NO LAM CATHETER, [   ] LAM CATHETER- no meatal tear, no discharge, [   ] other:                                            MSK: [  x ] No joint swelling, Full ROM,   [   ] other:                                         Ext: [ x  ]No C/C/E, No calf tenderness,   [ X  ]other:  RUE edema  Skin: [ x  ]intact,   [   ] other:                                                                   Neurological Examination:  Cognitive: [    ] AAO x 3,   [  x  ]  other: awake, alert, can nod head but inconsistent with correct answer and follow simple commands less than 50% of time                                                                      Attention:  [ X   ] intact,   [    ]  other: can attend to interviewer on both right and left sides. Needs redirection.                           Memory: [    ] intact,    [  X  ]  other: unable to assess 2/2 aphasia   Mood/Affect: [  X  ] wnl,    [    ]  other:                                                                             Communication: [    ]Fluent, no dysarthria, following commands:  [  X  ] other: mute, expressive>receptive aphasia, follows simple commands less than 50% of time with frequent redirection   CN II - XII:  [     ] intact,  [  X  ] other: Right lower facial droop, remainder grossly intact visual fields appear intact eyes tracking to stimuli     MEDICATIONS  (STANDING):  aspirin  chewable 81 milliGRAM(s) Oral daily  atorvastatin 80 milliGRAM(s) Oral at bedtime  carbidopa/levodopa  25/250 2 Tablet(s) Oral <User Schedule>  carbidopa/levodopa CR 50/200 1 Tablet(s) Oral <User Schedule>  chlorhexidine 2% Cloths 1 Application(s) Topical <User Schedule>  clopidogrel Tablet 75 milliGRAM(s) Oral daily  enoxaparin Injectable 40 milliGRAM(s) SubCutaneous <User Schedule>  entacapone 200 milliGRAM(s) Oral <User Schedule>  levETIRAcetam 500 milliGRAM(s) Oral two times a day  melatonin 3 milliGRAM(s) Oral at bedtime  midodrine 10 milliGRAM(s) Oral every 8 hours  mirtazapine 7.5 milliGRAM(s) Oral at bedtime  polyethylene glycol 3350 17 Gram(s) Oral two times a day  rivastigmine patch  9.5 mG/24 Hr(s) 1 Patch Transdermal every 24 hours  senna 2 Tablet(s) Oral at bedtime    MEDICATIONS  (PRN):  acetaminophen     Tablet .. 650 milliGRAM(s) Oral every 6 hours PRN Temp greater or equal to 38C (100.4F), Mild Pain (1 - 3)      POCT Blood Glucose.: 129 mg/dL (05-07-25 @ 11:04)  POCT Blood Glucose.: 89 mg/dL (05-07-25 @ 07:25)  POCT Blood Glucose.: 142 mg/dL (05-06-25 @ 16:50)  POCT Blood Glucose.: 222 mg/dL (05-06-25 @ 10:52)  POCT Blood Glucose.: 107 mg/dL (05-06-25 @ 07:22)  POCT Blood Glucose.: 90 mg/dL (05-05-25 @ 16:48)  POCT Blood Glucose.: 125 mg/dL (05-05-25 @ 11:26)  POCT Blood Glucose.: 95 mg/dL (05-05-25 @ 07:23)  POCT Blood Glucose.: 141 mg/dL (05-04-25 @ 16:31)  POCT Blood Glucose.: 91 mg/dL (05-04-25 @ 07:56)  POCT Blood Glucose.: 126 mg/dL (05-03-25 @ 16:56)   Patient is a 74 y old male who presents with a chief complaint of Rehab for bilateral  acute ischemic strokes  / aphasia, right facial droop /  Parkinson's disease (01 May 2025 11:31)      HPI:  73 y/o M with PMHx of Parkinson's, HTN, DM, gout, seizures on Keppra, h/o ischemic CVA x3 last stroke in January 2025 with residual expressive aphasia/dysarthria/R facial droop, who presented to the ED on 6/23 with severe expressive aphasia, LKW 9:30 pm. NIHSS 10 upon arrival for severe aphasia/dysarthria as pt nonverbal, intermittently able to follow commands, baseline R facial droop, LOC questions. mRs 1. NI alert activated 14:02. CTH negative, CTA H&N showed focal occlusion in the M2/M3 junction of the superior division of the left MCA, new since the prior CTA from 1/20/2023. New multifocal severe stenosis in the left A3 FITO. Stable moderate stenosis in the proximal right M1 MCA. New diffuse moderate to severe irregular stenosis in the bilateral PCAs, left worse than right. CTP with 41 mL of delayed perfusion in the left frontal lobe correlating with the CTA finding. No tnk as Pt out of window. NI actual activated. During angiogram, no occlusion observed to perform thrombectomy on however diffuse ICAD vs. vasospasm was seen in the left ICA for which he received 10mg IA Verapamil with improvement in vessel narrowing.  Post-op NIH 9. Exam improved to NIH 5 w/ BP augmentation. Pt was admitted to NSICU for BP augmentation following stroke  While in the NCC, remained on IV levophed for hemodynamic augmentation, tapering down; NIHSS was stable 5-6. On 4/27: aphasia worsened, stroke code was called. Urgent CTH obtained does not show any changes from the prior scan. Now downgraded to stroke unit on 4/29. Currently, Pt with NIHSS of 9 for LOC, right facial, Mute with severe expressive aphasia, can intermittently follow simple commands but not complex commands. During this hospital course, patient had scattered multiple foci of acute ischemic infarcts in multiple vascular territories (right subinsular region, bilateral thalami, right basal ganglia, bilateral medial temporal lobes and left cerebellum). Etiology likely ICAD as seen on MRI.     Imaging:  CT Head: No evidence of acute transcortical infarct, acute intracranial hemorrhage, or mass effect.  MR Head Non Contrast: Scattered multiple foci of acute infarcts in multiple vascular territories as described. No mass effect or midline shift. Multiple tiny foci showing signal dropout/blooming artifact on susceptibility weighted images at right subinsular region, bilateral thalami, right basal ganglia, bilateral medial temporal lobes and left cerebellum, probably representing microhemorrhages.  CT Angio Head and Neck: Focal occlusion in the M2/M3 junction of the superior division of the left MCA, new since the prior CTA from 1/20/2023. New multifocal severe stenosis in the left A3 FITO. Stable moderate stenosis in the proximal right M1 MCA. New diffuse moderate to severe irregular stenosis in the bilateral PCAs, left worse than right. Stable severe left/moderate right atherosclerotic stenosis in the V4 vertebral arteries.    Prior to admission, the patient was independent in ADLs and ambulation without an assistive device living in an assited living facility. Patient now requires assistance with ambulation and ADLs due to acute infarcts in multiple vascular territories (right subinsular region, bilateral thalami, right basal ganglia, bilateral medial temporal lobes and left cerebellum). Therefore, there is a significant functional decline from baseline. Patients also with medical comorbidities of seizures, parkinsons, HTN, DM, requiring medication management and monitoring of vitals by Physiatry team and nursing. To return home it is in the patient’s best interest to have acute inpatient rehabilitative services to undergo intensive interdisciplinary therapy. Furthermore, the patient is motivated and is able to tolerate up to 3 hours of daily therapy for 5-6 days a week for a total of 15 hours a week. Evaluated by Physiatry and deemed to be an excellent candidate for admission to  for acute inpatient rehab. Admitted to Acute Rehab on 5/1/25.    Pt seen and examined by PT, OT, ST and Physiatry and is currently functioning at modA bed mobility, maxA transfers, ambulates 5 ft RW modA, then 35 ft RW Reza, dressing with modA. On minced and moist diet for dysphagia.    LS: lives in assisted living facility with private A  PLOF: unable to confirm due to patient aphasia, as of 2/2025 discharged from acute rehab previously functioning at bed mobility supervision; Transfers sup/touchA-partialA; ambulation 150 ft x2/RW/touchA; UBD partialA; LBD partialA (01 May 2025 11:31)    TODAY'S SUBJECTIVE & REVIEW OF SYMPTOMS:    Patient was examined by me yesterday, but note was not entered correctly and not in chart.   Patient seen this morning while recieving PT, tolerating therapy well. Underwent MBS, to continue current diet.   Straight cath x1 today with 800cc, PVR bladder scan q8 in setting of limited communication  VSS    CLOF:  eating - partial-modA  UBD - partialA  LBD - partialA  transfers- partial A/ CG  toilet- partial-modA  mobility- RW 50' partial A/ CG    Review of Systems: Unable to obtain due to mutism and inconsistent head nodding to questioning.    PHYSICAL EXAM    Vital Signs Last 24 Hrs  T(C): 36.4 (05-07-25 @ 06:00), Max: 36.7 (05-06-25 @ 19:47)  T(F): 97.5 (05-07-25 @ 06:00), Max: 98 (05-06-25 @ 19:47)  HR: 68 (05-07-25 @ 06:00) (68 - 86)  BP: 138/83 (05-07-25 @ 06:00) (136/86 - 142/72)  BP(mean): 102 (05-06-25 @ 19:47) (102 - 102)  RR: 18 (05-07-25 @ 06:00) (18 - 18)  SpO2: 96% (05-07-25 @ 06:00) (96% - 98%)      General:[ x  ] NAD, Resting Comfortable,   [   ] other:                                HEENT: [ x  ] NC/AT, EOMI, PERRL , Normal Conjunctiva,   [   ] other:  Cardio: [  x ] RRR, no murmur,   [   ] other:                              Pulm: [  x ] No Respiratory Distress,  Lungs CTAB,   [   ] other:                       Abdomen: [ x  ]ND/NT, Soft,   [   ] other:    : [ x  ] NO LAM CATHETER, [   ] LAM CATHETER- no meatal tear, no discharge, [   ] other:                                            MSK: [  x ] No joint swelling, Full ROM,   [   ] other:                                         Ext: [ x  ]No C/C/E, No calf tenderness,   [ X  ]other:  RUE edema  Skin: [ x  ]intact,   [   ] other:                                                                   Neurological Examination:  Cognitive: [    ] AAO x 3,   [  x  ]  other: awake, alert, can nod head but inconsistent with correct answer and follow simple commands less than 50% of time                                                                      Attention:  [ X   ] intact,   [    ]  other: can attend to interviewer on both right and left sides. Needs redirection.                           Memory: [    ] intact,    [  X  ]  other: unable to assess 2/2 aphasia   Mood/Affect: [  X  ] wnl,    [    ]  other:                                                                             Communication: [    ]Fluent, no dysarthria, following commands:  [  X  ] other: mute, expressive>receptive aphasia, follows simple commands less than 50% of time with frequent redirection   CN II - XII:  [     ] intact,  [  X  ] other: Right lower facial droop, remainder grossly intact visual fields appear intact eyes tracking to stimuli     MEDICATIONS  (STANDING):  aspirin  chewable 81 milliGRAM(s) Oral daily  atorvastatin 80 milliGRAM(s) Oral at bedtime  carbidopa/levodopa  25/250 2 Tablet(s) Oral <User Schedule>  carbidopa/levodopa CR 50/200 1 Tablet(s) Oral <User Schedule>  chlorhexidine 2% Cloths 1 Application(s) Topical <User Schedule>  clopidogrel Tablet 75 milliGRAM(s) Oral daily  enoxaparin Injectable 40 milliGRAM(s) SubCutaneous <User Schedule>  entacapone 200 milliGRAM(s) Oral <User Schedule>  levETIRAcetam 500 milliGRAM(s) Oral two times a day  melatonin 3 milliGRAM(s) Oral at bedtime  midodrine 10 milliGRAM(s) Oral every 8 hours  mirtazapine 7.5 milliGRAM(s) Oral at bedtime  polyethylene glycol 3350 17 Gram(s) Oral two times a day  rivastigmine patch  9.5 mG/24 Hr(s) 1 Patch Transdermal every 24 hours  senna 2 Tablet(s) Oral at bedtime    MEDICATIONS  (PRN):  acetaminophen     Tablet .. 650 milliGRAM(s) Oral every 6 hours PRN Temp greater or equal to 38C (100.4F), Mild Pain (1 - 3)          RECENT LABS:      POCT Blood Glucose.: 129 mg/dL (05-07-25 @ 11:04)  POCT Blood Glucose.: 89 mg/dL (05-07-25 @ 07:25)  POCT Blood Glucose.: 142 mg/dL (05-06-25 @ 16:50)  POCT Blood Glucose.: 222 mg/dL (05-06-25 @ 10:52)  POCT Blood Glucose.: 107 mg/dL (05-06-25 @ 07:22)  POCT Blood Glucose.: 90 mg/dL (05-05-25 @ 16:48)  POCT Blood Glucose.: 125 mg/dL (05-05-25 @ 11:26)  POCT Blood Glucose.: 95 mg/dL (05-05-25 @ 07:23)  POCT Blood Glucose.: 141 mg/dL (05-04-25 @ 16:31)  POCT Blood Glucose.: 91 mg/dL (05-04-25 @ 07:56)  POCT Blood Glucose.: 126 mg/dL (05-03-25 @ 16:56)

## 2025-05-08 LAB
GLUCOSE BLDC GLUCOMTR-MCNC: 112 MG/DL — HIGH (ref 70–99)
GLUCOSE BLDC GLUCOMTR-MCNC: 117 MG/DL — HIGH (ref 70–99)
GLUCOSE BLDC GLUCOMTR-MCNC: 96 MG/DL — SIGNIFICANT CHANGE UP (ref 70–99)

## 2025-05-08 RX ORDER — BACITRACIN 500 UNIT/G
1 OINTMENT (GRAM) TOPICAL
Refills: 0 | Status: DISCONTINUED | OUTPATIENT
Start: 2025-05-08 | End: 2025-05-14

## 2025-05-08 RX ADMIN — ENTACAPONE 200 MILLIGRAM(S): 200 TABLET, FILM COATED ORAL at 21:21

## 2025-05-08 RX ADMIN — Medication 2 TABLET(S): at 06:15

## 2025-05-08 RX ADMIN — RIVASTIGMINE 1 PATCH: 13.3 PATCH, EXTENDED RELEASE TRANSDERMAL at 06:37

## 2025-05-08 RX ADMIN — RIVASTIGMINE 1 PATCH: 13.3 PATCH, EXTENDED RELEASE TRANSDERMAL at 05:33

## 2025-05-08 RX ADMIN — ENTACAPONE 200 MILLIGRAM(S): 200 TABLET, FILM COATED ORAL at 06:15

## 2025-05-08 RX ADMIN — MIRTAZAPINE 7.5 MILLIGRAM(S): 30 TABLET, FILM COATED ORAL at 21:22

## 2025-05-08 RX ADMIN — CLOPIDOGREL BISULFATE 75 MILLIGRAM(S): 75 TABLET, FILM COATED ORAL at 12:01

## 2025-05-08 RX ADMIN — Medication 2 TABLET(S): at 21:22

## 2025-05-08 RX ADMIN — ENTACAPONE 200 MILLIGRAM(S): 200 TABLET, FILM COATED ORAL at 18:05

## 2025-05-08 RX ADMIN — Medication 81 MILLIGRAM(S): at 12:01

## 2025-05-08 RX ADMIN — Medication 2 TABLET(S): at 12:01

## 2025-05-08 RX ADMIN — Medication 1 APPLICATION(S): at 12:29

## 2025-05-08 RX ADMIN — Medication 2 TABLET(S): at 18:04

## 2025-05-08 RX ADMIN — MIDODRINE HYDROCHLORIDE 10 MILLIGRAM(S): 5 TABLET ORAL at 05:34

## 2025-05-08 RX ADMIN — LEVETIRACETAM 500 MILLIGRAM(S): 10 INJECTION, SOLUTION INTRAVENOUS at 05:34

## 2025-05-08 RX ADMIN — Medication 1 TABLET(S): at 21:21

## 2025-05-08 RX ADMIN — ATORVASTATIN CALCIUM 80 MILLIGRAM(S): 80 TABLET, FILM COATED ORAL at 21:21

## 2025-05-08 RX ADMIN — ENOXAPARIN SODIUM 40 MILLIGRAM(S): 100 INJECTION SUBCUTANEOUS at 12:00

## 2025-05-08 RX ADMIN — Medication 1 APPLICATION(S): at 05:40

## 2025-05-08 RX ADMIN — Medication 3 MILLIGRAM(S): at 21:22

## 2025-05-08 RX ADMIN — MIDODRINE HYDROCHLORIDE 10 MILLIGRAM(S): 5 TABLET ORAL at 12:00

## 2025-05-08 RX ADMIN — POLYETHYLENE GLYCOL 3350 17 GRAM(S): 17 POWDER, FOR SOLUTION ORAL at 05:34

## 2025-05-08 RX ADMIN — LEVETIRACETAM 500 MILLIGRAM(S): 10 INJECTION, SOLUTION INTRAVENOUS at 18:05

## 2025-05-08 RX ADMIN — ENTACAPONE 200 MILLIGRAM(S): 200 TABLET, FILM COATED ORAL at 12:29

## 2025-05-08 RX ADMIN — POLYETHYLENE GLYCOL 3350 17 GRAM(S): 17 POWDER, FOR SOLUTION ORAL at 18:04

## 2025-05-08 RX ADMIN — MIDODRINE HYDROCHLORIDE 10 MILLIGRAM(S): 5 TABLET ORAL at 21:22

## 2025-05-08 NOTE — PROGRESS NOTE ADULT - SUBJECTIVE AND OBJECTIVE BOX
Patient is a 74 y old male who presents with a chief complaint of Rehab for bilateral  acute ischemic strokes  / aphasia, right facial droop /  Parkinson's disease (01 May 2025 11:31)      HPI:  75 y/o M with PMHx of Parkinson's, HTN, DM, gout, seizures on Keppra, h/o ischemic CVA x3 last stroke in January 2025 with residual expressive aphasia/dysarthria/R facial droop, who presented to the ED on 6/23 with severe expressive aphasia, LKW 9:30 pm. NIHSS 10 upon arrival for severe aphasia/dysarthria as pt nonverbal, intermittently able to follow commands, baseline R facial droop, LOC questions. mRs 1. NI alert activated 14:02. CTH negative, CTA H&N showed focal occlusion in the M2/M3 junction of the superior division of the left MCA, new since the prior CTA from 1/20/2023. New multifocal severe stenosis in the left A3 FITO. Stable moderate stenosis in the proximal right M1 MCA. New diffuse moderate to severe irregular stenosis in the bilateral PCAs, left worse than right. CTP with 41 mL of delayed perfusion in the left frontal lobe correlating with the CTA finding. No tnk as Pt out of window. NI actual activated. During angiogram, no occlusion observed to perform thrombectomy on however diffuse ICAD vs. vasospasm was seen in the left ICA for which he received 10mg IA Verapamil with improvement in vessel narrowing.  Post-op NIH 9. Exam improved to NIH 5 w/ BP augmentation. Pt was admitted to NSICU for BP augmentation following stroke  While in the NCC, remained on IV levophed for hemodynamic augmentation, tapering down; NIHSS was stable 5-6. On 4/27: aphasia worsened, stroke code was called. Urgent CTH obtained does not show any changes from the prior scan. Now downgraded to stroke unit on 4/29. Currently, Pt with NIHSS of 9 for LOC, right facial, Mute with severe expressive aphasia, can intermittently follow simple commands but not complex commands. During this hospital course, patient had scattered multiple foci of acute ischemic infarcts in multiple vascular territories (right subinsular region, bilateral thalami, right basal ganglia, bilateral medial temporal lobes and left cerebellum). Etiology likely ICAD as seen on MRI.     Imaging:  CT Head: No evidence of acute transcortical infarct, acute intracranial hemorrhage, or mass effect.  MR Head Non Contrast: Scattered multiple foci of acute infarcts in multiple vascular territories as described. No mass effect or midline shift. Multiple tiny foci showing signal dropout/blooming artifact on susceptibility weighted images at right subinsular region, bilateral thalami, right basal ganglia, bilateral medial temporal lobes and left cerebellum, probably representing microhemorrhages.  CT Angio Head and Neck: Focal occlusion in the M2/M3 junction of the superior division of the left MCA, new since the prior CTA from 1/20/2023. New multifocal severe stenosis in the left A3 FITO. Stable moderate stenosis in the proximal right M1 MCA. New diffuse moderate to severe irregular stenosis in the bilateral PCAs, left worse than right. Stable severe left/moderate right atherosclerotic stenosis in the V4 vertebral arteries.    Prior to admission, the patient was independent in ADLs and ambulation without an assistive device living in an assited living facility. Patient now requires assistance with ambulation and ADLs due to acute infarcts in multiple vascular territories (right subinsular region, bilateral thalami, right basal ganglia, bilateral medial temporal lobes and left cerebellum). Therefore, there is a significant functional decline from baseline. Patients also with medical comorbidities of seizures, parkinsons, HTN, DM, requiring medication management and monitoring of vitals by Physiatry team and nursing. To return home it is in the patient’s best interest to have acute inpatient rehabilitative services to undergo intensive interdisciplinary therapy. Furthermore, the patient is motivated and is able to tolerate up to 3 hours of daily therapy for 5-6 days a week for a total of 15 hours a week. Evaluated by Physiatry and deemed to be an excellent candidate for admission to  for acute inpatient rehab. Admitted to Acute Rehab on 5/1/25.    Pt seen and examined by PT, OT, ST and Physiatry and is currently functioning at modA bed mobility, maxA transfers, ambulates 5 ft RW modA, then 35 ft RW Reza, dressing with modA. On minced and moist diet for dysphagia.    LS: lives in assisted living facility with private A  PLOF: unable to confirm due to patient aphasia, as of 2/2025 discharged from acute rehab previously functioning at bed mobility supervision; Transfers sup/touchA-partialA; ambulation 150 ft x2/RW/touchA; UBD partialA; LBD partialA (01 May 2025 11:31)    TODAY'S SUBJECTIVE & REVIEW OF SYMPTOMS:    NO new complaints. Alert. VSS. Neuro exam stable.   VSS    CLOF:  eating - partial-modA  UBD - partialA  LBD - partialA  transfers- partial A/ CG  toilet- partial-modA  mobility- RW 50' partial A/ CG    Review of Systems: Unable to obtain due to mutism and inconsistent head nodding to questioning.    PHYSICAL EXAM    Vital Signs Last 24 Hrs  T(C): 36.8 (08 May 2025 05:29), Max: 37 (07 May 2025 20:01)  T(F): 98.2 (08 May 2025 05:29), Max: 98.6 (07 May 2025 20:01)  HR: 100 (08 May 2025 12:10) (62 - 106)  BP: 130/89 (08 May 2025 12:10) (111/68 - 144/93)  BP(mean): 110 (07 May 2025 20:01) (110 - 110)  RR: 18 (08 May 2025 12:10) (18 - 18)  SpO2: 90% (08 May 2025 12:10) (90% - 97%)    Parameters below as of 08 May 2025 12:10  Patient On (Oxygen Delivery Method): room air        General:[ x  ] NAD, Resting Comfortable,   [   ] other:                                HEENT: [ x  ] NC/AT, EOMI, PERRL , Normal Conjunctiva,   [   ] other:  Cardio: [  x ] RRR, no murmur,   [   ] other:                              Pulm: [  x ] No Respiratory Distress,  Lungs CTAB,   [   ] other:                       Abdomen: [ x  ]ND/NT, Soft,   [   ] other:    : [ x  ] NO LAM CATHETER, [   ] LAM CATHETER- no meatal tear, no discharge, [   ] other:                                            MSK: [  x ] No joint swelling, Full ROM,   [   ] other:                                         Ext: [ x  ]No C/C/E, No calf tenderness,   [ X  ]other:  RUE edema  Skin: [ x  ]intact,   [   ] other:                                                                   Neurological Examination:  Cognitive: [    ] AAO x 3,   [  x  ]  other: awake, alert, can nod head but inconsistent with correct answer and follow simple commands less than 50% of time                                                                      Attention:  [ X   ] intact,   [    ]  other: can attend to interviewer on both right and left sides. Needs redirection.                           Memory: [    ] intact,    [  X  ]  other: unable to assess 2/2 aphasia   Mood/Affect: [  X  ] wnl,    [    ]  other:                                                                             Communication: [    ]Fluent, no dysarthria, following commands:  [  X  ] other: mute, expressive>receptive aphasia, follows simple commands less than 50% of time with frequent redirection   CN II - XII:  [     ] intact,  [  X  ] other: Right lower facial droop, remainder grossly intact visual fields appear intact eyes tracking to stimuli     MEDICATIONS  (STANDING):  aspirin  chewable 81 milliGRAM(s) Oral daily  atorvastatin 80 milliGRAM(s) Oral at bedtime  bacitracin   Ointment 1 Application(s) Topical two times a day  carbidopa/levodopa  25/250 2 Tablet(s) Oral <User Schedule>  carbidopa/levodopa CR 50/200 1 Tablet(s) Oral <User Schedule>  chlorhexidine 2% Cloths 1 Application(s) Topical <User Schedule>  clopidogrel Tablet 75 milliGRAM(s) Oral daily  enoxaparin Injectable 40 milliGRAM(s) SubCutaneous <User Schedule>  entacapone 200 milliGRAM(s) Oral <User Schedule>  levETIRAcetam 500 milliGRAM(s) Oral two times a day  melatonin 3 milliGRAM(s) Oral at bedtime  midodrine 10 milliGRAM(s) Oral every 8 hours  mirtazapine 7.5 milliGRAM(s) Oral at bedtime  polyethylene glycol 3350 17 Gram(s) Oral two times a day  rivastigmine patch  9.5 mG/24 Hr(s) 1 Patch Transdermal every 24 hours  senna 2 Tablet(s) Oral at bedtime    MEDICATIONS  (PRN):  acetaminophen     Tablet .. 650 milliGRAM(s) Oral every 6 hours PRN Temp greater or equal to 38C (100.4F), Mild Pain (1 - 3)        RECENT LABS:    CAPILLARY BLOOD GLUCOSE    POCT Blood Glucose.: 117 mg/dL (08 May 2025 16:13)  POCT Blood Glucose.: 112 mg/dL (08 May 2025 11:46)  POCT Blood Glucose.: 96 mg/dL (08 May 2025 07:25)    POCT Blood Glucose.: 129 mg/dL (05-07-25 @ 11:04)  POCT Blood Glucose.: 89 mg/dL (05-07-25 @ 07:25)  POCT Blood Glucose.: 142 mg/dL (05-06-25 @ 16:50)  POCT Blood Glucose.: 222 mg/dL (05-06-25 @ 10:52)  POCT Blood Glucose.: 107 mg/dL (05-06-25 @ 07:22)  POCT Blood Glucose.: 90 mg/dL (05-05-25 @ 16:48)  POCT Blood Glucose.: 125 mg/dL (05-05-25 @ 11:26)  POCT Blood Glucose.: 95 mg/dL (05-05-25 @ 07:23)  POCT Blood Glucose.: 141 mg/dL (05-04-25 @ 16:31)  POCT Blood Glucose.: 91 mg/dL (05-04-25 @ 07:56)  POCT Blood Glucose.: 126 mg/dL (05-03-25 @ 16:56)

## 2025-05-08 NOTE — PROGRESS NOTE ADULT - ASSESSMENT
Patient is a 75 y/o M with PMHx of Parkinson's, HTN, DM, gout, seizures on keppra, prior ischemic stroke x3 most recently January 2025 with residual expressive aphasia/dysarthria/R facial droop, who presented to the ED on 6/23 with severe expressive aphasia, NIHSS 10 upon arrival for severe aphasia/dysarthria as pt nonverbal, intermittently able to follow commands, baseline R facial droop, LOC questions. mRs 1. NI alert activated, CTH negative, CTA H&N showed focal occlusion in the M2/M3 junction of the superior division of the left MCA, new since the prior CTA from 1/20/2023. New multifocal severe stenosis in the left A3 FITO. Stable moderate stenosis in the proximal right M1 MCA. New diffuse moderate to severe irregular stenosis in the bilateral PCAs, left worse than right. CTP with 41 mL of delayed perfusion in the left frontal lobe correlating with the CTA finding. No tnk as Pt out of window. NI activated, During angiogram, no occlusion observed to perform thrombectomy on however diffuse ICAD vs. vasospasm was seen in the left ICA for which he received 10mg IA Verapamil with improvement in vessel narrowing.  Post-op NIH 9. Exam improved to NIH 5 w/ BP augmentation. Pt was admitted to NSICU for BP augmentation following stroke  While in the NCC, remained on IV levophed for hemodynamic augmentation, tapering down; NIHSS was stable 5-6. On 4/27: aphasia worsened, stroke code was called. Urgent CTH obtained does not show any changes from the prior scan. Now downgraded to stroke unit on 4/29. Currently, Pt with NIHSS of 9 for LOC, right facial, Mute with severe expressive aphasia, can intermittently follow simple commands but not complex commands. Patient hemodynamically stable for admission to acute rehab on 5/1/25.     #Rehab for scattered multiple foci of acute ischemic infarcts in multiple vascular territories (right subinsular region, bilateral thalami, right basal ganglia, bilateral medial temporal lobes and left cerebellum) causing worsened aphasia, right facial droop, gait abnormality and functional decline  #History for L corona radiata stroke in January 2025 with residual expressive aphasia/dysarthria/R facial droop.  - Etiology likely ICAD  - CTA on arrival with left M2/M3 focal occlusion. Stable focal moderate stenosis of the R V4 segment and L V4.  - CT perfusion: with 41 mL of delayed perfusion in the left frontal lobe correlating with the CTA finding  - Cerebral Angiogram: no occlusion noted but possible vasospasm. s/p 10 mg IA verapamil   - ILR interrogated by EP: No AFib (ILR placed in 2025)   - TTE:  EF of 69 %., grade 1 diastolic dysfunction. Normal atria   - PRU on Plavix: 151- 4/27/25   - A1c 5.6,  LDL 44, TSH 0.88  - Home antiplatelets: Aspirin 81 mg and Plavix 75 mg   - C/w DAPT (Aspirin and Plavix) x 90 days, and then Aspirin monotherapy  - C/w Lipitor 80 mg qd  - continue midodrine 10 mg q8 hrs  - SBP goal 130-160   - PT/OT/SLP/neuropsych  -The patient requires acute rehab with 3 hours of daily therapies at least 5 out of 7 days and close physiatry follow up.   - PVR Bladder scan q8  - 5/7 - underwent MBS, continue current diet- minced and moist, thins    #Hx of Seizure  - EEG 4/26/25 showed left hemispheric frontotemporally predominant focal cerebral dysfunction can be structural or functional in etiology.  - C/w home Keppra 500 mg bid  - asymptomatic    #Hx of Parkinson's Syndrome  - Home med: Sinemet 25/100 TID, Sinemet 50/200 qhs   - C/w 25/250 BID and 50/200 qhs   - C/w rivastigmine 9.5 mg patch qd  - C/w entacapone 200 mg QID (7am/12am/5pm/9pm)    #Chronic Thrombocytopenia - resolved  - Plts stable  - Continue to monitor     #RUE superficial thrombophelibits  - RUE swelling  - Duplex showed: IMPRESSION: No evidence of right upper extremity deep venous thrombosis. Superficial thrombophlebitis of the right median antecubital vein  - Warm packs and supportive care    #Misc  -Pain control:  Tylenol  -GI/Bowel Mgmt: senna/Miralax  -Skin: intact  -Diet: minced and moist, thins, CC  On Remeron at bedtime    Precautions / PROPHYLAXIS:    - Falls, aspiration  -DVT Prophylaxis: Lovenox 40 mg QD .

## 2025-05-09 RX ADMIN — RIVASTIGMINE 1 PATCH: 13.3 PATCH, EXTENDED RELEASE TRANSDERMAL at 07:31

## 2025-05-09 RX ADMIN — MIRTAZAPINE 7.5 MILLIGRAM(S): 30 TABLET, FILM COATED ORAL at 21:35

## 2025-05-09 RX ADMIN — MIDODRINE HYDROCHLORIDE 10 MILLIGRAM(S): 5 TABLET ORAL at 13:57

## 2025-05-09 RX ADMIN — CLOPIDOGREL BISULFATE 75 MILLIGRAM(S): 75 TABLET, FILM COATED ORAL at 12:15

## 2025-05-09 RX ADMIN — Medication 3 MILLIGRAM(S): at 21:34

## 2025-05-09 RX ADMIN — Medication 2 TABLET(S): at 13:57

## 2025-05-09 RX ADMIN — MIDODRINE HYDROCHLORIDE 10 MILLIGRAM(S): 5 TABLET ORAL at 06:19

## 2025-05-09 RX ADMIN — Medication 81 MILLIGRAM(S): at 12:15

## 2025-05-09 RX ADMIN — ENTACAPONE 200 MILLIGRAM(S): 200 TABLET, FILM COATED ORAL at 18:41

## 2025-05-09 RX ADMIN — RIVASTIGMINE 1 PATCH: 13.3 PATCH, EXTENDED RELEASE TRANSDERMAL at 06:28

## 2025-05-09 RX ADMIN — ATORVASTATIN CALCIUM 80 MILLIGRAM(S): 80 TABLET, FILM COATED ORAL at 21:35

## 2025-05-09 RX ADMIN — Medication 1 TABLET(S): at 21:35

## 2025-05-09 RX ADMIN — Medication 1 APPLICATION(S): at 06:26

## 2025-05-09 RX ADMIN — ENTACAPONE 200 MILLIGRAM(S): 200 TABLET, FILM COATED ORAL at 21:34

## 2025-05-09 RX ADMIN — ENOXAPARIN SODIUM 40 MILLIGRAM(S): 100 INJECTION SUBCUTANEOUS at 15:25

## 2025-05-09 RX ADMIN — LEVETIRACETAM 500 MILLIGRAM(S): 10 INJECTION, SOLUTION INTRAVENOUS at 06:19

## 2025-05-09 RX ADMIN — Medication 1 APPLICATION(S): at 06:25

## 2025-05-09 RX ADMIN — RIVASTIGMINE 1 PATCH: 13.3 PATCH, EXTENDED RELEASE TRANSDERMAL at 21:30

## 2025-05-09 RX ADMIN — Medication 2 TABLET(S): at 06:19

## 2025-05-09 RX ADMIN — Medication 2 TABLET(S): at 18:41

## 2025-05-09 RX ADMIN — LEVETIRACETAM 500 MILLIGRAM(S): 10 INJECTION, SOLUTION INTRAVENOUS at 17:27

## 2025-05-09 RX ADMIN — MIDODRINE HYDROCHLORIDE 10 MILLIGRAM(S): 5 TABLET ORAL at 21:34

## 2025-05-09 RX ADMIN — ENTACAPONE 200 MILLIGRAM(S): 200 TABLET, FILM COATED ORAL at 06:19

## 2025-05-09 RX ADMIN — Medication 2 TABLET(S): at 21:34

## 2025-05-09 RX ADMIN — ENTACAPONE 200 MILLIGRAM(S): 200 TABLET, FILM COATED ORAL at 13:57

## 2025-05-09 RX ADMIN — RIVASTIGMINE 1 PATCH: 13.3 PATCH, EXTENDED RELEASE TRANSDERMAL at 06:19

## 2025-05-09 RX ADMIN — Medication 1 APPLICATION(S): at 17:27

## 2025-05-09 NOTE — PROGRESS NOTE ADULT - SUBJECTIVE AND OBJECTIVE BOX
Patient is a 74 y old male who presents with a chief complaint of Rehab for bilateral  acute ischemic strokes  / aphasia, right facial droop /  Parkinson's disease (01 May 2025 11:31)      HPI:  73 y/o M with PMHx of Parkinson's, HTN, DM, gout, seizures on Keppra, h/o ischemic CVA x3 last stroke in January 2025 with residual expressive aphasia/dysarthria/R facial droop, who presented to the ED on 6/23 with severe expressive aphasia, LKW 9:30 pm. NIHSS 10 upon arrival for severe aphasia/dysarthria as pt nonverbal, intermittently able to follow commands, baseline R facial droop, LOC questions. mRs 1. NI alert activated 14:02. CTH negative, CTA H&N showed focal occlusion in the M2/M3 junction of the superior division of the left MCA, new since the prior CTA from 1/20/2023. New multifocal severe stenosis in the left A3 FITO. Stable moderate stenosis in the proximal right M1 MCA. New diffuse moderate to severe irregular stenosis in the bilateral PCAs, left worse than right. CTP with 41 mL of delayed perfusion in the left frontal lobe correlating with the CTA finding. No tnk as Pt out of window. NI actual activated. During angiogram, no occlusion observed to perform thrombectomy on however diffuse ICAD vs. vasospasm was seen in the left ICA for which he received 10mg IA Verapamil with improvement in vessel narrowing.  Post-op NIH 9. Exam improved to NIH 5 w/ BP augmentation. Pt was admitted to NSICU for BP augmentation following stroke  While in the NCC, remained on IV levophed for hemodynamic augmentation, tapering down; NIHSS was stable 5-6. On 4/27: aphasia worsened, stroke code was called. Urgent CTH obtained does not show any changes from the prior scan. Now downgraded to stroke unit on 4/29. Currently, Pt with NIHSS of 9 for LOC, right facial, Mute with severe expressive aphasia, can intermittently follow simple commands but not complex commands. During this hospital course, patient had scattered multiple foci of acute ischemic infarcts in multiple vascular territories (right subinsular region, bilateral thalami, right basal ganglia, bilateral medial temporal lobes and left cerebellum). Etiology likely ICAD as seen on MRI.     Imaging:  CT Head: No evidence of acute transcortical infarct, acute intracranial hemorrhage, or mass effect.  MR Head Non Contrast: Scattered multiple foci of acute infarcts in multiple vascular territories as described. No mass effect or midline shift. Multiple tiny foci showing signal dropout/blooming artifact on susceptibility weighted images at right subinsular region, bilateral thalami, right basal ganglia, bilateral medial temporal lobes and left cerebellum, probably representing microhemorrhages.  CT Angio Head and Neck: Focal occlusion in the M2/M3 junction of the superior division of the left MCA, new since the prior CTA from 1/20/2023. New multifocal severe stenosis in the left A3 FITO. Stable moderate stenosis in the proximal right M1 MCA. New diffuse moderate to severe irregular stenosis in the bilateral PCAs, left worse than right. Stable severe left/moderate right atherosclerotic stenosis in the V4 vertebral arteries.    Prior to admission, the patient was independent in ADLs and ambulation without an assistive device living in an assited living facility. Patient now requires assistance with ambulation and ADLs due to acute infarcts in multiple vascular territories (right subinsular region, bilateral thalami, right basal ganglia, bilateral medial temporal lobes and left cerebellum). Therefore, there is a significant functional decline from baseline. Patients also with medical comorbidities of seizures, parkinsons, HTN, DM, requiring medication management and monitoring of vitals by Physiatry team and nursing. To return home it is in the patient’s best interest to have acute inpatient rehabilitative services to undergo intensive interdisciplinary therapy. Furthermore, the patient is motivated and is able to tolerate up to 3 hours of daily therapy for 5-6 days a week for a total of 15 hours a week. Evaluated by Physiatry and deemed to be an excellent candidate for admission to  for acute inpatient rehab. Admitted to Acute Rehab on 5/1/25.    Pt seen and examined by PT, OT, ST and Physiatry and is currently functioning at modA bed mobility, maxA transfers, ambulates 5 ft RW modA, then 35 ft RW Reza, dressing with modA. On minced and moist diet for dysphagia.    LS: lives in assisted living facility with private A  PLOF: unable to confirm due to patient aphasia, as of 2/2025 discharged from acute rehab previously functioning at bed mobility supervision; Transfers sup/touchA-partialA; ambulation 150 ft x2/RW/touchA; UBD partialA; LBD partialA (01 May 2025 11:31)    TODAY'S SUBJECTIVE & REVIEW OF SYMPTOMS:    NO new complaints. Alert. VSS. Neuro exam stable. Appears calm and comfortable. Participating well in therapies.    CLOF:  eating - partial-modA  UBD - partialA  LBD - partialA  transfers- partial A/ CG  toilet- partial-modA  mobility- RW 50' partial A/ CG    Review of Systems: Unable to obtain due to mutism and inconsistent head nodding to questioning.    PHYSICAL EXAM    Vital Signs Last 24 Hrs  T(C): 36.7 (09 May 2025 13:27), Max: 36.7 (08 May 2025 19:54)  T(F): 98 (09 May 2025 13:27), Max: 98 (08 May 2025 19:54)  HR: 84 (09 May 2025 13:27) (61 - 84)  BP: 148/89 (09 May 2025 13:27) (148/75 - 156/91)  BP(mean): 112 (08 May 2025 19:54) (112 - 112)  RR: 18 (09 May 2025 13:27) (18 - 18)  SpO2: 97% (09 May 2025 13:27) (95% - 97%)    Parameters below as of 09 May 2025 05:49  Patient On (Oxygen Delivery Method): room air      General:[ x  ] NAD, Resting Comfortable,   [   ] other:                                HEENT: [ x  ] NC/AT, EOMI, PERRL , Normal Conjunctiva,   [   ] other:  Cardio: [  x ] RRR, no murmur,   [   ] other:                              Pulm: [  x ] No Respiratory Distress,  Lungs CTAB,   [   ] other:                       Abdomen: [ x  ]ND/NT, Soft,   [   ] other:    : [ x  ] NO LAM CATHETER, [   ] LAM CATHETER- no meatal tear, no discharge, [   ] other:                                            MSK: [  x ] No joint swelling, Full ROM,   [   ] other:                                         Ext: [ x  ]No C/C/E, No calf tenderness,   [ X  ]other:  RUE edema  Skin: [ x  ]intact,   [   ] other:                                                                   Neurological Examination:  Cognitive: [    ] AAO x 3,   [  x  ]  other: awake, alert, can nod head but inconsistent with correct answer and follow simple commands less than 50% of time, perseveraton                                                                      Attention:  [ X   ] intact,   [    ]  other: can attend to interviewer on both right and left sides. Needs redirection.                           Memory: [    ] intact,    [  X  ]  other: unable to assess 2/2 aphasia   Mood/Affect: [  X  ] wnl,    [    ]  other:                                                                             Communication: [    ]Fluent, no dysarthria, following commands:  [  X  ] other: mute, expressive>receptive aphasia, follows simple commands less than 50% of time with frequent redirection   CN II - XII:  [     ] intact,  [  X  ] other: Right lower facial droop, remainder grossly intact visual fields appear intact eyes tracking to stimuli     MEDICATIONS  (STANDING):  aspirin  chewable 81 milliGRAM(s) Oral daily  atorvastatin 80 milliGRAM(s) Oral at bedtime  bacitracin   Ointment 1 Application(s) Topical two times a day  carbidopa/levodopa  25/250 2 Tablet(s) Oral <User Schedule>  carbidopa/levodopa CR 50/200 1 Tablet(s) Oral <User Schedule>  chlorhexidine 2% Cloths 1 Application(s) Topical <User Schedule>  clopidogrel Tablet 75 milliGRAM(s) Oral daily  enoxaparin Injectable 40 milliGRAM(s) SubCutaneous <User Schedule>  entacapone 200 milliGRAM(s) Oral <User Schedule>  levETIRAcetam 500 milliGRAM(s) Oral two times a day  melatonin 3 milliGRAM(s) Oral at bedtime  midodrine 10 milliGRAM(s) Oral every 8 hours  mirtazapine 7.5 milliGRAM(s) Oral at bedtime  polyethylene glycol 3350 17 Gram(s) Oral two times a day  rivastigmine patch  9.5 mG/24 Hr(s) 1 Patch Transdermal every 24 hours  senna 2 Tablet(s) Oral at bedtime    MEDICATIONS  (PRN):  acetaminophen     Tablet .. 650 milliGRAM(s) Oral every 6 hours PRN Temp greater or equal to 38C (100.4F), Mild Pain (1 - 3)        RECENT LABS:    CAPILLARY BLOOD GLUCOSE    A1C with Estimated Average Glucose (04.25.25 @ 05:00)    A1C with Estimated Average Glucose Result: 5.6: Method: Immunoassay      POCT Blood Glucose.: 117 mg/dL (05-08-25 @ 16:13)  POCT Blood Glucose.: 112 mg/dL (05-08-25 @ 11:46)  POCT Blood Glucose.: 96 mg/dL (05-08-25 @ 07:25)  POCT Blood Glucose.: 139 mg/dL (05-07-25 @ 16:36)  POCT Blood Glucose.: 129 mg/dL (05-07-25 @ 11:04)  POCT Blood Glucose.: 89 mg/dL (05-07-25 @ 07:25)  POCT Blood Glucose.: 142 mg/dL (05-06-25 @ 16:50)  POCT Blood Glucose.: 222 mg/dL (05-06-25 @ 10:52)  POCT Blood Glucose.: 107 mg/dL (05-06-25 @ 07:22)  POCT Blood Glucose.: 90 mg/dL (05-05-25 @ 16:48)

## 2025-05-09 NOTE — PROGRESS NOTE ADULT - ASSESSMENT
Patient is a 73 y/o M with PMHx of Parkinson's, HTN, DM, gout, seizures on keppra, prior ischemic stroke x3 most recently January 2025 with residual expressive aphasia/dysarthria/R facial droop, who presented to the ED on 6/23 with severe expressive aphasia, NIHSS 10 upon arrival for severe aphasia/dysarthria as pt nonverbal, intermittently able to follow commands, baseline R facial droop, LOC questions. mRs 1. NI alert activated, CTH negative, CTA H&N showed focal occlusion in the M2/M3 junction of the superior division of the left MCA, new since the prior CTA from 1/20/2023. New multifocal severe stenosis in the left A3 FITO. Stable moderate stenosis in the proximal right M1 MCA. New diffuse moderate to severe irregular stenosis in the bilateral PCAs, left worse than right. CTP with 41 mL of delayed perfusion in the left frontal lobe correlating with the CTA finding. No tnk as Pt out of window. NI activated, During angiogram, no occlusion observed to perform thrombectomy on however diffuse ICAD vs. vasospasm was seen in the left ICA for which he received 10mg IA Verapamil with improvement in vessel narrowing.  Post-op NIH 9. Exam improved to NIH 5 w/ BP augmentation. Pt was admitted to NSICU for BP augmentation following stroke  While in the NCC, remained on IV levophed for hemodynamic augmentation, tapering down; NIHSS was stable 5-6. On 4/27: aphasia worsened, stroke code was called. Urgent CTH obtained does not show any changes from the prior scan. Now downgraded to stroke unit on 4/29. Currently, Pt with NIHSS of 9 for LOC, right facial, Mute with severe expressive aphasia, can intermittently follow simple commands but not complex commands. Patient hemodynamically stable for admission to acute rehab on 5/1/25.     #Rehab for scattered multiple foci of acute ischemic infarcts in multiple vascular territories (right subinsular region, bilateral thalami, right basal ganglia, bilateral medial temporal lobes and left cerebellum) causing worsened aphasia, right facial droop, gait abnormality and functional decline  #History for L corona radiata stroke in January 2025 with residual expressive aphasia/dysarthria/R facial droop.  - Etiology likely ICAD  - CTA on arrival with left M2/M3 focal occlusion. Stable focal moderate stenosis of the R V4 segment and L V4.  - CT perfusion: with 41 mL of delayed perfusion in the left frontal lobe correlating with the CTA finding  - Cerebral Angiogram: no occlusion noted but possible vasospasm. s/p 10 mg IA verapamil   - ILR interrogated by EP: No AFib (ILR placed in 2025)   - TTE:  EF of 69 %., grade 1 diastolic dysfunction. Normal atria   - PRU on Plavix: 151- 4/27/25   - A1c 5.6,  LDL 44, TSH 0.88  - Home antiplatelets: Aspirin 81 mg and Plavix 75 mg   - C/w DAPT (Aspirin and Plavix) x 90 days, and then Aspirin monotherapy  - C/w Lipitor 80 mg qd  - continue midodrine 10 mg q8 hrs  - SBP goal 130-160   - PT/OT/SLP/neuropsych  -The patient requires acute rehab with 3 hours of daily therapies at least 5 out of 7 days and close physiatry follow up.     #History of DM2  - HbA1c 5.6 on no meds  - Blood sugars in good range on no meds  - can discontinue fingersticks    #Dysphagia  - 5/7 - underwent MBS, continue current diet- minced and moist, thins  - continue daily ST    #Hx of Seizure  - EEG 4/26/25 showed left hemispheric frontotemporally predominant focal cerebral dysfunction can be structural or functional in etiology.  - C/w home Keppra 500 mg bid  - asymptomatic    #Hx of Parkinson's Syndrome  - Home med: Sinemet 25/100 TID, Sinemet 50/200 qhs   - C/w 25/250 BID and 50/200 qhs   - C/w rivastigmine 9.5 mg patch qd  - C/w entacapone 200 mg QID (7am/12am/5pm/9pm)    #Chronic Thrombocytopenia - resolved  - Plts stable  - Continue to monitor     #RUE superficial thrombophlebitis  - RUE swelling  - Duplex showed: IMPRESSION: No evidence of right upper extremity deep venous thrombosis. Superficial thrombophlebitis of the right median antecubital vein  - Warm packs and supportive care - resolved    #Misc  -Pain control:  Tylenol  -GI/Bowel Mgmt: senna/Miralax  -Skin: intact  -Diet: minced and moist, thins, CC  On Remeron at bedtime    Precautions / PROPHYLAXIS:    - Falls, aspiration  -DVT Prophylaxis: Lovenox 40 mg QD .

## 2025-05-10 RX ADMIN — CLOPIDOGREL BISULFATE 75 MILLIGRAM(S): 75 TABLET, FILM COATED ORAL at 12:01

## 2025-05-10 RX ADMIN — MIDODRINE HYDROCHLORIDE 10 MILLIGRAM(S): 5 TABLET ORAL at 14:15

## 2025-05-10 RX ADMIN — POLYETHYLENE GLYCOL 3350 17 GRAM(S): 17 POWDER, FOR SOLUTION ORAL at 06:16

## 2025-05-10 RX ADMIN — Medication 2 TABLET(S): at 06:12

## 2025-05-10 RX ADMIN — Medication 1 APPLICATION(S): at 06:11

## 2025-05-10 RX ADMIN — Medication 1 APPLICATION(S): at 18:51

## 2025-05-10 RX ADMIN — RIVASTIGMINE 1 PATCH: 13.3 PATCH, EXTENDED RELEASE TRANSDERMAL at 06:18

## 2025-05-10 RX ADMIN — MIDODRINE HYDROCHLORIDE 10 MILLIGRAM(S): 5 TABLET ORAL at 06:12

## 2025-05-10 RX ADMIN — Medication 1 TABLET(S): at 21:31

## 2025-05-10 RX ADMIN — ENTACAPONE 200 MILLIGRAM(S): 200 TABLET, FILM COATED ORAL at 06:12

## 2025-05-10 RX ADMIN — Medication 2 TABLET(S): at 12:11

## 2025-05-10 RX ADMIN — POLYETHYLENE GLYCOL 3350 17 GRAM(S): 17 POWDER, FOR SOLUTION ORAL at 18:51

## 2025-05-10 RX ADMIN — RIVASTIGMINE 1 PATCH: 13.3 PATCH, EXTENDED RELEASE TRANSDERMAL at 19:40

## 2025-05-10 RX ADMIN — ENTACAPONE 200 MILLIGRAM(S): 200 TABLET, FILM COATED ORAL at 18:53

## 2025-05-10 RX ADMIN — ENTACAPONE 200 MILLIGRAM(S): 200 TABLET, FILM COATED ORAL at 21:32

## 2025-05-10 RX ADMIN — MIRTAZAPINE 7.5 MILLIGRAM(S): 30 TABLET, FILM COATED ORAL at 21:32

## 2025-05-10 RX ADMIN — LEVETIRACETAM 500 MILLIGRAM(S): 10 INJECTION, SOLUTION INTRAVENOUS at 18:51

## 2025-05-10 RX ADMIN — Medication 81 MILLIGRAM(S): at 12:01

## 2025-05-10 RX ADMIN — ENTACAPONE 200 MILLIGRAM(S): 200 TABLET, FILM COATED ORAL at 12:10

## 2025-05-10 RX ADMIN — MIDODRINE HYDROCHLORIDE 10 MILLIGRAM(S): 5 TABLET ORAL at 21:31

## 2025-05-10 RX ADMIN — RIVASTIGMINE 1 PATCH: 13.3 PATCH, EXTENDED RELEASE TRANSDERMAL at 06:11

## 2025-05-10 RX ADMIN — LEVETIRACETAM 500 MILLIGRAM(S): 10 INJECTION, SOLUTION INTRAVENOUS at 06:12

## 2025-05-10 RX ADMIN — Medication 2 TABLET(S): at 18:53

## 2025-05-10 RX ADMIN — ENOXAPARIN SODIUM 40 MILLIGRAM(S): 100 INJECTION SUBCUTANEOUS at 14:15

## 2025-05-10 RX ADMIN — Medication 3 MILLIGRAM(S): at 21:31

## 2025-05-10 RX ADMIN — ATORVASTATIN CALCIUM 80 MILLIGRAM(S): 80 TABLET, FILM COATED ORAL at 21:31

## 2025-05-10 RX ADMIN — Medication 1 APPLICATION(S): at 06:17

## 2025-05-10 RX ADMIN — Medication 2 TABLET(S): at 21:31

## 2025-05-10 NOTE — PROGRESS NOTE ADULT - ATTENDING COMMENTS
Rehab for scattered multiple foci of acute ischemic infarcts in multiple vascular territories (right subinsular region, bilateral thalami, right basal ganglia, bilateral medial temporal lobes and left cerebellum) causing worsened aphasia, right facial droop, gait abnormality and functional decline. Patient seen and examined with the resident. The treatment plan discussed. Agree with the above.
I reviewed the chart and examined the patient with the rehab resident and we discussed the findings and treatment plan.  The patient is tolerating the bedside rehab program well. I agree with the findings and treatment plan above, which I modified as indicated. The patient requires 3 hrs a day of acute inpatient rehab. above and assessment:    Patient is a 75 y/o M with PMHx of Parkinson's, HTN, DM, gout, seizures on keppra, prior ischemic stroke x3 most recently January 2025 with residual expressive aphasia/dysarthria/R facial droop, who presented to the ED on 6/23 with severe expressive aphasia, NIHSS 10 upon arrival for severe aphasia/dysarthria as pt nonverbal, intermittently able to follow commands, baseline R facial droop, LOC questions. mRs 1. NI alert activated, CTH negative, CTA H&N showed focal occlusion in the M2/M3 junction of the superior division of the left MCA, new since the prior CTA from 1/20/2023. New multifocal severe stenosis in the left A3 FITO. Stable moderate stenosis in the proximal right M1 MCA. New diffuse moderate to severe irregular stenosis in the bilateral PCAs, left worse than right. CTP with 41 mL of delayed perfusion in the left frontal lobe correlating with the CTA finding. No tnk as Pt out of window. NI activated, During angiogram, no occlusion observed to perform thrombectomy on however diffuse ICAD vs. vasospasm was seen in the left ICA for which he received 10mg IA Verapamil with improvement in vessel narrowing.  Post-op NIH 9. Exam improved to NIH 5 w/ BP augmentation. Pt was admitted to NSICU for BP augmentation following stroke  While in the [facility], remained on IV levophed for hemodynamic augmentation, tapering down; NIHSS was stable 5-6. On 4/27: aphasia worsened, stroke code was called. Urgent CTH obtained does not show any changes from the prior scan. Now downgraded to stroke unit on 4/29. Currently, Pt with NIHSS of 9 for LOC, right facial, Mute with severe expressive aphasia, can intermittently follow simple commands but not complex commands. Patient hemodynamically stable for admission to acute rehab on 5/1/25.     #Rehab for scattered multiple foci of acute ischemic infarcts in multiple vascular territories (right subinsular region, bilateral thalami, right basal ganglia, bilateral medial temporal lobes and left cerebellum) causing worsened aphasia, right facial droop, gait abnormality and functional decline  #History for L corona radiata stroke in January 2025 with residual expressive aphasia/dysarthria/R facial droop.  - Etiology likely ICAD  - CTA on arrival with left M2/M3 focal occlusion. Stable focal moderate stenosis of the R V4 segment and L V4.  - CT perfusion: with 41 mL of delayed perfusion in the left frontal lobe correlating with the CTA finding  - Cerebral Angiogram: no occlusion noted but possible vasospasm. s/p 10 mg IA verapamil   - ILR interrogated by EP: No AFib (ILR placed in 2025)   - TTE:  EF of 69 %., grade 1 diastolic dysfunction. Normal atria   - PRU on Plavix: 151- 4/27/25   - A1c 5.6,  LDL 44, TSH 0.88  - Home antiplatelets: Aspirin 81 mg and Plavix 75 mg   - C/w DAPT (Aspirin and Plavix) x 90 days, and then Aspirin monotherapy  - C/w lipitor 80mg qd  - increased Midodrine 10 mg q8 hrs on 5/1/25  - SBP goal 130-160   - PT/OT/SLP/neuropsych  -The patient requires acute rehab with 3 hours of daily therapies at least 5 out of 7 days and close physiatry follow up.     #Hx of Seizure  - EEG 4/26/25 showed left hemispheric frontotemporally predominant focal cerebral dysfunction can be structural or functional in etiology.  - C/w home Keppra 500 mg bid    #Hx of Parkinson  - Home med: Sinemet 25/100 TID, Sinemet 50/200 qhs   - C/w 25/250 TID and 50/200 qhs   - C/w rivastigmine 9.5 mg patch qd  - C/w entacapone 200 mg QID (7am/12am/5pm/9pm)    #Chronic Thrombocytopenia - resolved  - Plts stable  - Continue to monitor       #Misc  -Pain control:  Tylenol  -GI/Bowel Mgmt: senna/Miralax  -Skin: intact  -Diet: minced and moist, thins, CC  On Remeron at bedtime    Precautions / PROPHYLAXIS:    - Falls, aspiration  -DVT Prophylaxis: Chemical: Lovenox 40 mg QD  and Mechanical: SCDs
I reviewed the chart and examined the patient with the resident and we discussed the findings and treatment plan.  The patient is tolerating the rehab program well. I agree with the findings and treatment plan above, which I modified as indicated. The patient requires 3 hrs a day of acute inpatient rehab. No new complaints. Alert. VSS. Improved comprehension/ command following. Ambulation with RW and CG. FSG well controlled. BP in good range on Midodrine. Continue full rehab program.    I read, edited and agree with the physical exam above and assessment:  #Rehab for scattered multiple foci of acute ischemic infarcts in multiple vascular territories (right subinsular region, bilateral thalami, right basal ganglia, bilateral medial temporal lobes and left cerebellum) causing worsened aphasia, right facial droop, gait abnormality and functional decline  #History for L corona radiata stroke in January 2025 with residual expressive aphasia/dysarthria/R facial droop.  - Etiology likely ICAD  - CTA on arrival with left M2/M3 focal occlusion. Stable focal moderate stenosis of the R V4 segment and L V4.  - CT perfusion: with 41 mL of delayed perfusion in the left frontal lobe correlating with the CTA finding  - Cerebral Angiogram: no occlusion noted but possible vasospasm. s/p 10 mg IA verapamil   - ILR interrogated by EP: No AFib (ILR placed in 2025)   - TTE:  EF of 69 %., grade 1 diastolic dysfunction. Normal atria   - PRU on Plavix: 151- 4/27/25   - A1c 5.6,  LDL 44, TSH 0.88  - Home antiplatelets: Aspirin 81 mg and Plavix 75 mg   - C/w DAPT (Aspirin and Plavix) x 90 days, and then Aspirin monotherapy  - C/w Lipitor 80 mg qd  - continue midodrine 10 mg q8 hrs  - SBP goal 130-160   - PT/OT/SLP/neuropsych  -The patient requires acute rehab with 3 hours of daily therapies at least 5 out of 7 days and close physiatry follow up.   - PVR Bladder scan q8  - 5/7 - underwent MBS, continue current diet- minced and moist, thins    #Hx of Seizure  - EEG 4/26/25 showed left hemispheric frontotemporally predominant focal cerebral dysfunction can be structural or functional in etiology.  - C/w home Keppra 500 mg bid  - asymptomatic    #Hx of Parkinson's Syndrome  - Home med: Sinemet 25/100 TID, Sinemet 50/200 qhs   - C/w 25/250 BID and 50/200 qhs   - C/w rivastigmine 9.5 mg patch qd  - C/w entacapone 200 mg QID (7am/12am/5pm/9pm)    #Chronic Thrombocytopenia - resolved  - Plts stable  - Continue to monitor     #RUE superficial thrombophelibits  - RUE swelling  - Duplex showed: IMPRESSION: No evidence of right upper extremity deep venous thrombosis. Superficial thrombophlebitis of the right median antecubital vein  - Warm packs and supportive care    #Misc  -Pain control:  Tylenol  -GI/Bowel Mgmt: senna/Miralax  -Skin: intact  -Diet: minced and moist, thins, CC  On Remeron at bedtime    Precautions / PROPHYLAXIS:    - Falls, aspiration  -DVT Prophylaxis: Lovenox 40 mg QD

## 2025-05-10 NOTE — PROGRESS NOTE ADULT - SUBJECTIVE AND OBJECTIVE BOX
Patient is a 74 y old male who presents with a chief complaint of Rehab for bilateral  acute ischemic strokes  / aphasia, right facial droop /  Parkinson's disease (01 May 2025 11:31)    HPI:  75 y/o M with PMHx of Parkinson's, HTN, DM, gout, seizures on Keppra, h/o ischemic CVA x3 last stroke in January 2025 with residual expressive aphasia/dysarthria/R facial droop, who presented to the ED on 6/23 with severe expressive aphasia, LKW 9:30 pm. NIHSS 10 upon arrival for severe aphasia/dysarthria as pt nonverbal, intermittently able to follow commands, baseline R facial droop, LOC questions. mRs 1. NI alert activated 14:02. CTH negative, CTA H&N showed focal occlusion in the M2/M3 junction of the superior division of the left MCA, new since the prior CTA from 1/20/2023. New multifocal severe stenosis in the left A3 FITO. Stable moderate stenosis in the proximal right M1 MCA. New diffuse moderate to severe irregular stenosis in the bilateral PCAs, left worse than right. CTP with 41 mL of delayed perfusion in the left frontal lobe correlating with the CTA finding. No tnk as Pt out of window. NI actual activated. During angiogram, no occlusion observed to perform thrombectomy on however diffuse ICAD vs. vasospasm was seen in the left ICA for which he received 10mg IA Verapamil with improvement in vessel narrowing.  Post-op NIH 9. Exam improved to NIH 5 w/ BP augmentation. Pt was admitted to NSICU for BP augmentation following stroke  While in the NCC, remained on IV levophed for hemodynamic augmentation, tapering down; NIHSS was stable 5-6. On 4/27: aphasia worsened, stroke code was called. Urgent CTH obtained does not show any changes from the prior scan. Now downgraded to stroke unit on 4/29. Currently, Pt with NIHSS of 9 for LOC, right facial, Mute with severe expressive aphasia, can intermittently follow simple commands but not complex commands. During this hospital course, patient had scattered multiple foci of acute ischemic infarcts in multiple vascular territories (right subinsular region, bilateral thalami, right basal ganglia, bilateral medial temporal lobes and left cerebellum). Etiology likely ICAD as seen on MRI.     Imaging:  CT Head: No evidence of acute transcortical infarct, acute intracranial hemorrhage, or mass effect.  MR Head Non Contrast: Scattered multiple foci of acute infarcts in multiple vascular territories as described. No mass effect or midline shift. Multiple tiny foci showing signal dropout/blooming artifact on susceptibility weighted images at right subinsular region, bilateral thalami, right basal ganglia, bilateral medial temporal lobes and left cerebellum, probably representing microhemorrhages.  CT Angio Head and Neck: Focal occlusion in the M2/M3 junction of the superior division of the left MCA, new since the prior CTA from 1/20/2023. New multifocal severe stenosis in the left A3 FITO. Stable moderate stenosis in the proximal right M1 MCA. New diffuse moderate to severe irregular stenosis in the bilateral PCAs, left worse than right. Stable severe left/moderate right atherosclerotic stenosis in the V4 vertebral arteries.    Prior to admission, the patient was independent in ADLs and ambulation without an assistive device living in an assited living facility. Patient now requires assistance with ambulation and ADLs due to acute infarcts in multiple vascular territories (right subinsular region, bilateral thalami, right basal ganglia, bilateral medial temporal lobes and left cerebellum). Therefore, there is a significant functional decline from baseline. Patients also with medical comorbidities of seizures, parkinsons, HTN, DM, requiring medication management and monitoring of vitals by Physiatry team and nursing. To return home it is in the patient’s best interest to have acute inpatient rehabilitative services to undergo intensive interdisciplinary therapy. Furthermore, the patient is motivated and is able to tolerate up to 3 hours of daily therapy for 5-6 days a week for a total of 15 hours a week. Evaluated by Physiatry and deemed to be an excellent candidate for admission to  for acute inpatient rehab. Admitted to Acute Rehab on 5/1/25.    Pt seen and examined by PT, OT, ST and Physiatry and is currently functioning at modA bed mobility, maxA transfers, ambulates 5 ft RW modA, then 35 ft RW Reza, dressing with modA. On minced and moist diet for dysphagia.    LS: lives in assisted living facility with private A  PLOF: unable to confirm due to patient aphasia, as of 2/2025 discharged from acute rehab previously functioning at bed mobility supervision; Transfers sup/touchA-partialA; ambulation 150 ft x2/RW/touchA; UBD partialA; LBD partialA (01 May 2025 11:31)    TODAY'S SUBJECTIVE & REVIEW OF SYMPTOMS:  Patient seen and examined at bedside. No acute overnight events. Pt in good spirits without active complaints. Participating and tolerating therapy.   Vitals reviewed. Voiding spontaneously. Having regular bowel movements.    CLOF:  eating - partial-modA  UBD - partialA  LBD - partialA  transfers- partial A/ CG  toilet- partial-modA  mobility- RW 50' partial A/ CG    Review of Systems: Unable to obtain due to mutism and inconsistent head nodding to questioning.  Denies CP, SOB, nausea, vomiting, headaches, pain, dysuria, constipation     PHYSICAL EXAM  Vital Signs Last 24 Hrs  T(C): 36.4 (10 May 2025 06:03), Max: 36.7 (09 May 2025 13:27)  T(F): 97.5 (10 May 2025 06:03), Max: 98 (09 May 2025 13:27)  HR: 55 (10 May 2025 06:03) (55 - 84)  BP: 159/82 (10 May 2025 06:03) (148/89 - 159/82)  BP(mean): 107 (10 May 2025 06:03) (107 - 107)  RR: 18 (10 May 2025 06:03) (18 - 18)  SpO2: 98% (10 May 2025 06:03) (97% - 98%)    Parameters below as of 09 May 2025 19:47  Patient On (Oxygen Delivery Method): room air    General:[ x  ] NAD, Resting Comfortable,   [   ] other:                                HEENT: [ x  ] NC/AT, EOMI, PERRL , Normal Conjunctiva,   [   ] other:  Cardio: [  x ] RRR, no murmur,   [   ] other:                              Pulm: [  x ] No Respiratory Distress,  Lungs CTAB,   [   ] other:                       Abdomen: [ x  ]ND/NT, Soft,   [   ] other:    : [ x  ] NO LAM CATHETER, [   ] LAM CATHETER- no meatal tear, no discharge, [   ] other:                                            MSK: [  x ] No joint swelling, Full ROM,   [   ] other:                                         Ext: [ x  ]No C/C/E, No calf tenderness,   [ X  ]other:  RUE edema  Skin: [ x  ]intact,   [   ] other:                                                                   Neurological Examination:  Cognitive: [    ] AAO x 3,   [  x  ]  other: awake, alert, can nod head but inconsistent with correct answer and follow simple commands less than 50% of time, perseveraton                                                                      Attention:  [ X   ] intact,   [    ]  other: can attend to interviewer on both right and left sides. Needs redirection.                           Memory: [    ] intact,    [  X  ]  other: unable to assess 2/2 aphasia   Mood/Affect: [  X  ] wnl,    [    ]  other:                                                                             Communication: [    ]Fluent, no dysarthria, following commands:  [  X  ] other: mute, expressive>receptive aphasia, follows simple commands less than 50% of time with frequent redirection   CN II - XII:  [     ] intact,  [  X  ] other: Right lower facial droop, remainder grossly intact visual fields appear intact eyes tracking to stimuli     MEDICATIONS  (STANDING):  aspirin  chewable 81 milliGRAM(s) Oral daily  atorvastatin 80 milliGRAM(s) Oral at bedtime  bacitracin   Ointment 1 Application(s) Topical two times a day  carbidopa/levodopa  25/250 2 Tablet(s) Oral <User Schedule>  carbidopa/levodopa CR 50/200 1 Tablet(s) Oral <User Schedule>  chlorhexidine 2% Cloths 1 Application(s) Topical <User Schedule>  clopidogrel Tablet 75 milliGRAM(s) Oral daily  enoxaparin Injectable 40 milliGRAM(s) SubCutaneous <User Schedule>  entacapone 200 milliGRAM(s) Oral <User Schedule>  levETIRAcetam 500 milliGRAM(s) Oral two times a day  melatonin 3 milliGRAM(s) Oral at bedtime  midodrine 10 milliGRAM(s) Oral every 8 hours  mirtazapine 7.5 milliGRAM(s) Oral at bedtime  polyethylene glycol 3350 17 Gram(s) Oral two times a day  rivastigmine patch  9.5 mG/24 Hr(s) 1 Patch Transdermal every 24 hours  senna 2 Tablet(s) Oral at bedtime    MEDICATIONS  (PRN):  acetaminophen     Tablet .. 650 milliGRAM(s) Oral every 6 hours PRN Temp greater or equal to 38C (100.4F), Mild Pain (1 - 3)      RECENT LABS:    POCT Blood Glucose.: 117 mg/dL (05-08-25 @ 16:13)  POCT Blood Glucose.: 112 mg/dL (05-08-25 @ 11:46)  POCT Blood Glucose.: 96 mg/dL (05-08-25 @ 07:25)  POCT Blood Glucose.: 139 mg/dL (05-07-25 @ 16:36)  POCT Blood Glucose.: 129 mg/dL (05-07-25 @ 11:04)  POCT Blood Glucose.: 89 mg/dL (05-07-25 @ 07:25)  POCT Blood Glucose.: 142 mg/dL (05-06-25 @ 16:50)  POCT Blood Glucose.: 222 mg/dL (05-06-25 @ 10:52)

## 2025-05-11 RX ADMIN — RIVASTIGMINE 1 PATCH: 13.3 PATCH, EXTENDED RELEASE TRANSDERMAL at 05:42

## 2025-05-11 RX ADMIN — LEVETIRACETAM 500 MILLIGRAM(S): 10 INJECTION, SOLUTION INTRAVENOUS at 17:27

## 2025-05-11 RX ADMIN — ENTACAPONE 200 MILLIGRAM(S): 200 TABLET, FILM COATED ORAL at 12:02

## 2025-05-11 RX ADMIN — POLYETHYLENE GLYCOL 3350 17 GRAM(S): 17 POWDER, FOR SOLUTION ORAL at 17:27

## 2025-05-11 RX ADMIN — ENTACAPONE 200 MILLIGRAM(S): 200 TABLET, FILM COATED ORAL at 05:35

## 2025-05-11 RX ADMIN — ENOXAPARIN SODIUM 40 MILLIGRAM(S): 100 INJECTION SUBCUTANEOUS at 13:37

## 2025-05-11 RX ADMIN — ENTACAPONE 200 MILLIGRAM(S): 200 TABLET, FILM COATED ORAL at 17:27

## 2025-05-11 RX ADMIN — ENTACAPONE 200 MILLIGRAM(S): 200 TABLET, FILM COATED ORAL at 21:33

## 2025-05-11 RX ADMIN — LEVETIRACETAM 500 MILLIGRAM(S): 10 INJECTION, SOLUTION INTRAVENOUS at 05:36

## 2025-05-11 RX ADMIN — MIRTAZAPINE 7.5 MILLIGRAM(S): 30 TABLET, FILM COATED ORAL at 21:33

## 2025-05-11 RX ADMIN — CLOPIDOGREL BISULFATE 75 MILLIGRAM(S): 75 TABLET, FILM COATED ORAL at 12:02

## 2025-05-11 RX ADMIN — MIDODRINE HYDROCHLORIDE 10 MILLIGRAM(S): 5 TABLET ORAL at 05:36

## 2025-05-11 RX ADMIN — Medication 81 MILLIGRAM(S): at 12:02

## 2025-05-11 RX ADMIN — RIVASTIGMINE 1 PATCH: 13.3 PATCH, EXTENDED RELEASE TRANSDERMAL at 06:51

## 2025-05-11 RX ADMIN — MIDODRINE HYDROCHLORIDE 10 MILLIGRAM(S): 5 TABLET ORAL at 21:33

## 2025-05-11 RX ADMIN — Medication 1 APPLICATION(S): at 05:42

## 2025-05-11 RX ADMIN — RIVASTIGMINE 1 PATCH: 13.3 PATCH, EXTENDED RELEASE TRANSDERMAL at 05:35

## 2025-05-11 RX ADMIN — Medication 1 TABLET(S): at 21:32

## 2025-05-11 RX ADMIN — Medication 1 APPLICATION(S): at 17:27

## 2025-05-11 RX ADMIN — Medication 2 TABLET(S): at 17:27

## 2025-05-11 RX ADMIN — Medication 1 APPLICATION(S): at 05:35

## 2025-05-11 RX ADMIN — Medication 3 MILLIGRAM(S): at 21:33

## 2025-05-11 RX ADMIN — Medication 2 TABLET(S): at 12:02

## 2025-05-11 RX ADMIN — POLYETHYLENE GLYCOL 3350 17 GRAM(S): 17 POWDER, FOR SOLUTION ORAL at 05:35

## 2025-05-11 RX ADMIN — Medication 2 TABLET(S): at 21:32

## 2025-05-11 RX ADMIN — ATORVASTATIN CALCIUM 80 MILLIGRAM(S): 80 TABLET, FILM COATED ORAL at 21:33

## 2025-05-11 RX ADMIN — Medication 2 TABLET(S): at 05:35

## 2025-05-11 RX ADMIN — RIVASTIGMINE 1 PATCH: 13.3 PATCH, EXTENDED RELEASE TRANSDERMAL at 19:34

## 2025-05-11 NOTE — PROGRESS NOTE ADULT - SUBJECTIVE AND OBJECTIVE BOX
Patient is a 74 y old male who presents with a chief complaint of Rehab for bilateral  acute ischemic strokes  / aphasia, right facial droop /  Parkinson's disease (01 May 2025 11:31)    HPI:  73 y/o M with PMHx of Parkinson's, HTN, DM, gout, seizures on Keppra, h/o ischemic CVA x3 last stroke in January 2025 with residual expressive aphasia/dysarthria/R facial droop, who presented to the ED on 6/23 with severe expressive aphasia, LKW 9:30 pm. NIHSS 10 upon arrival for severe aphasia/dysarthria as pt nonverbal, intermittently able to follow commands, baseline R facial droop, LOC questions. mRs 1. NI alert activated 14:02. CTH negative, CTA H&N showed focal occlusion in the M2/M3 junction of the superior division of the left MCA, new since the prior CTA from 1/20/2023. New multifocal severe stenosis in the left A3 FITO. Stable moderate stenosis in the proximal right M1 MCA. New diffuse moderate to severe irregular stenosis in the bilateral PCAs, left worse than right. CTP with 41 mL of delayed perfusion in the left frontal lobe correlating with the CTA finding. No tnk as Pt out of window. NI actual activated. During angiogram, no occlusion observed to perform thrombectomy on however diffuse ICAD vs. vasospasm was seen in the left ICA for which he received 10mg IA Verapamil with improvement in vessel narrowing.  Post-op NIH 9. Exam improved to NIH 5 w/ BP augmentation. Pt was admitted to NSICU for BP augmentation following stroke  While in the NCC, remained on IV levophed for hemodynamic augmentation, tapering down; NIHSS was stable 5-6. On 4/27: aphasia worsened, stroke code was called. Urgent CTH obtained does not show any changes from the prior scan. Now downgraded to stroke unit on 4/29. Currently, Pt with NIHSS of 9 for LOC, right facial, Mute with severe expressive aphasia, can intermittently follow simple commands but not complex commands. During this hospital course, patient had scattered multiple foci of acute ischemic infarcts in multiple vascular territories (right subinsular region, bilateral thalami, right basal ganglia, bilateral medial temporal lobes and left cerebellum). Etiology likely ICAD as seen on MRI.     Imaging:  CT Head: No evidence of acute transcortical infarct, acute intracranial hemorrhage, or mass effect.  MR Head Non Contrast: Scattered multiple foci of acute infarcts in multiple vascular territories as described. No mass effect or midline shift. Multiple tiny foci showing signal dropout/blooming artifact on susceptibility weighted images at right subinsular region, bilateral thalami, right basal ganglia, bilateral medial temporal lobes and left cerebellum, probably representing microhemorrhages.  CT Angio Head and Neck: Focal occlusion in the M2/M3 junction of the superior division of the left MCA, new since the prior CTA from 1/20/2023. New multifocal severe stenosis in the left A3 FITO. Stable moderate stenosis in the proximal right M1 MCA. New diffuse moderate to severe irregular stenosis in the bilateral PCAs, left worse than right. Stable severe left/moderate right atherosclerotic stenosis in the V4 vertebral arteries.    Prior to admission, the patient was independent in ADLs and ambulation without an assistive device living in an assited living facility. Patient now requires assistance with ambulation and ADLs due to acute infarcts in multiple vascular territories (right subinsular region, bilateral thalami, right basal ganglia, bilateral medial temporal lobes and left cerebellum). Therefore, there is a significant functional decline from baseline. Patients also with medical comorbidities of seizures, parkinsons, HTN, DM, requiring medication management and monitoring of vitals by Physiatry team and nursing. To return home it is in the patient’s best interest to have acute inpatient rehabilitative services to undergo intensive interdisciplinary therapy. Furthermore, the patient is motivated and is able to tolerate up to 3 hours of daily therapy for 5-6 days a week for a total of 15 hours a week. Evaluated by Physiatry and deemed to be an excellent candidate for admission to  for acute inpatient rehab. Admitted to Acute Rehab on 5/1/25.    Pt seen and examined by PT, OT, ST and Physiatry and is currently functioning at modA bed mobility, maxA transfers, ambulates 5 ft RW modA, then 35 ft RW Reza, dressing with modA. On minced and moist diet for dysphagia.    LS: lives in assisted living facility with private A  PLOF: unable to confirm due to patient aphasia, as of 2/2025 discharged from acute rehab previously functioning at bed mobility supervision; Transfers sup/touchA-partialA; ambulation 150 ft x2/RW/touchA; UBD partialA; LBD partialA (01 May 2025 11:31)    TODAY'S SUBJECTIVE & REVIEW OF SYMPTOMS:  Patient seen and examined. No acute overnight events. Pt in good spirits without active complaints. Participating and tolerating therapy.   Vitals reviewed. Voiding spontaneously. Having regular bowel movements.    CLOF:  eating - partial-modA  UBD - partialA  LBD - partialA  transfers- partial A/ CG  toilet- partial-modA  mobility- RW 50' partial A/ CG    Review of Systems: Unable to obtain due to mutism and inconsistent head nodding to questioning.  Denies CP, SOB, nausea, vomiting, headaches, pain, dysuria, constipation     PHYSICAL EXAM    Vital Signs Last 24 Hrs  T(C): 36.6 (11 May 2025 04:30), Max: 36.9 (10 May 2025 13:35)  T(F): 97.9 (11 May 2025 04:30), Max: 98.4 (10 May 2025 13:35)  HR: 60 (11 May 2025 04:30) (60 - 79)  BP: 133/79 (11 May 2025 04:30) (133/79 - 156/82)  BP(mean): 97 (11 May 2025 04:30) (97 - 116)  RR: 18 (11 May 2025 04:30) (18 - 19)  SpO2: 96% (11 May 2025 04:30) (96% - 98%)    Parameters below as of 11 May 2025 04:30  Patient On (Oxygen Delivery Method): room air        General:[ x  ] NAD, Resting Comfortable,   [   ] other:                                HEENT: [ x  ] NC/AT, EOMI, PERRL , Normal Conjunctiva,   [   ] other:  Cardio: [  x ] RRR, no murmur,   [   ] other:                              Pulm: [  x ] No Respiratory Distress,  Lungs CTAB,   [   ] other:                       Abdomen: [ x  ]ND/NT, Soft,   [   ] other:    : [ x  ] NO LAM CATHETER, [   ] LAM CATHETER- no meatal tear, no discharge, [   ] other:                                            MSK: [  x ] No joint swelling, Full ROM,   [   ] other:                                         Ext: [ x  ]No C/C/E, No calf tenderness,   [ X  ]other:  RUE edema  Skin: [ x  ]intact,   [   ] other:                                                                   Neurological Examination:  Cognitive: [    ] AAO x 3,   [  x  ]  other: awake, alert, can nod head but inconsistent with correct answer and follow simple commands less than 50% of time, perseveraton                                                                      Attention:  [ X   ] intact,   [    ]  other: can attend to interviewer on both right and left sides. Needs redirection.                           Memory: [    ] intact,    [  X  ]  other: unable to assess 2/2 aphasia   Mood/Affect: [  X  ] wnl,    [    ]  other:                                                                             Communication: [    ]Fluent, no dysarthria, following commands:  [  X  ] other: mute, expressive>receptive aphasia, follows simple commands less than 50% of time with frequent redirection   CN II - XII:  [     ] intact,  [  X  ] other: Right lower facial droop, remainder grossly intact visual fields appear intact eyes tracking to stimuli     MEDICATIONS  (STANDING):  aspirin  chewable 81 milliGRAM(s) Oral daily  atorvastatin 80 milliGRAM(s) Oral at bedtime  bacitracin   Ointment 1 Application(s) Topical two times a day  carbidopa/levodopa  25/250 2 Tablet(s) Oral <User Schedule>  carbidopa/levodopa CR 50/200 1 Tablet(s) Oral <User Schedule>  chlorhexidine 2% Cloths 1 Application(s) Topical <User Schedule>  clopidogrel Tablet 75 milliGRAM(s) Oral daily  enoxaparin Injectable 40 milliGRAM(s) SubCutaneous <User Schedule>  entacapone 200 milliGRAM(s) Oral <User Schedule>  levETIRAcetam 500 milliGRAM(s) Oral two times a day  melatonin 3 milliGRAM(s) Oral at bedtime  midodrine 10 milliGRAM(s) Oral every 8 hours  mirtazapine 7.5 milliGRAM(s) Oral at bedtime  polyethylene glycol 3350 17 Gram(s) Oral two times a day  rivastigmine patch  9.5 mG/24 Hr(s) 1 Patch Transdermal every 24 hours  senna 2 Tablet(s) Oral at bedtime    MEDICATIONS  (PRN):  acetaminophen     Tablet .. 650 milliGRAM(s) Oral every 6 hours PRN Temp greater or equal to 38C (100.4F), Mild Pain (1 - 3)      RECENT LABS:    POCT Blood Glucose.: 117 mg/dL (05-08-25 @ 16:13)  POCT Blood Glucose.: 112 mg/dL (05-08-25 @ 11:46)  POCT Blood Glucose.: 96 mg/dL (05-08-25 @ 07:25)  POCT Blood Glucose.: 139 mg/dL (05-07-25 @ 16:36)  POCT Blood Glucose.: 129 mg/dL (05-07-25 @ 11:04)

## 2025-05-12 LAB
ALBUMIN SERPL ELPH-MCNC: 3.8 G/DL — SIGNIFICANT CHANGE UP (ref 3.5–5.2)
ALP SERPL-CCNC: 106 U/L — SIGNIFICANT CHANGE UP (ref 30–115)
ALT FLD-CCNC: 15 U/L — SIGNIFICANT CHANGE UP (ref 0–41)
ANION GAP SERPL CALC-SCNC: 10 MMOL/L — SIGNIFICANT CHANGE UP (ref 7–14)
AST SERPL-CCNC: 63 U/L — HIGH (ref 0–41)
BASOPHILS # BLD AUTO: 0.04 K/UL — SIGNIFICANT CHANGE UP (ref 0–0.2)
BASOPHILS NFR BLD AUTO: 0.4 % — SIGNIFICANT CHANGE UP (ref 0–1)
BILIRUB SERPL-MCNC: 0.9 MG/DL — SIGNIFICANT CHANGE UP (ref 0.2–1.2)
BUN SERPL-MCNC: 13 MG/DL — SIGNIFICANT CHANGE UP (ref 10–20)
CALCIUM SERPL-MCNC: 9.2 MG/DL — SIGNIFICANT CHANGE UP (ref 8.4–10.5)
CHLORIDE SERPL-SCNC: 105 MMOL/L — SIGNIFICANT CHANGE UP (ref 98–110)
CO2 SERPL-SCNC: 25 MMOL/L — SIGNIFICANT CHANGE UP (ref 17–32)
CREAT SERPL-MCNC: 0.9 MG/DL — SIGNIFICANT CHANGE UP (ref 0.7–1.5)
EGFR: 91 ML/MIN/1.73M2 — SIGNIFICANT CHANGE UP
EGFR: 91 ML/MIN/1.73M2 — SIGNIFICANT CHANGE UP
EOSINOPHIL # BLD AUTO: 0.13 K/UL — SIGNIFICANT CHANGE UP (ref 0–0.7)
EOSINOPHIL NFR BLD AUTO: 1.4 % — SIGNIFICANT CHANGE UP (ref 0–8)
GLUCOSE SERPL-MCNC: 78 MG/DL — SIGNIFICANT CHANGE UP (ref 70–99)
HCT VFR BLD CALC: 44.7 % — SIGNIFICANT CHANGE UP (ref 42–52)
HGB BLD-MCNC: 15.3 G/DL — SIGNIFICANT CHANGE UP (ref 14–18)
IMM GRANULOCYTES NFR BLD AUTO: 0.6 % — HIGH (ref 0.1–0.3)
LYMPHOCYTES # BLD AUTO: 2.84 K/UL — SIGNIFICANT CHANGE UP (ref 1.2–3.4)
LYMPHOCYTES # BLD AUTO: 29.7 % — SIGNIFICANT CHANGE UP (ref 20.5–51.1)
MAGNESIUM SERPL-MCNC: 2.3 MG/DL — SIGNIFICANT CHANGE UP (ref 1.8–2.4)
MCHC RBC-ENTMCNC: 34 PG — HIGH (ref 27–31)
MCHC RBC-ENTMCNC: 34.2 G/DL — SIGNIFICANT CHANGE UP (ref 32–37)
MCV RBC AUTO: 99.3 FL — HIGH (ref 80–94)
MONOCYTES # BLD AUTO: 0.63 K/UL — HIGH (ref 0.1–0.6)
MONOCYTES NFR BLD AUTO: 6.6 % — SIGNIFICANT CHANGE UP (ref 1.7–9.3)
NEUTROPHILS # BLD AUTO: 5.85 K/UL — SIGNIFICANT CHANGE UP (ref 1.4–6.5)
NEUTROPHILS NFR BLD AUTO: 61.3 % — SIGNIFICANT CHANGE UP (ref 42.2–75.2)
NRBC BLD AUTO-RTO: 0 /100 WBCS — SIGNIFICANT CHANGE UP (ref 0–0)
PLATELET # BLD AUTO: 188 K/UL — SIGNIFICANT CHANGE UP (ref 130–400)
PMV BLD: 9.4 FL — SIGNIFICANT CHANGE UP (ref 7.4–10.4)
POTASSIUM SERPL-MCNC: 4.6 MMOL/L — SIGNIFICANT CHANGE UP (ref 3.5–5)
POTASSIUM SERPL-SCNC: 4.6 MMOL/L — SIGNIFICANT CHANGE UP (ref 3.5–5)
PROT SERPL-MCNC: 6.2 G/DL — SIGNIFICANT CHANGE UP (ref 6–8)
RBC # BLD: 4.5 M/UL — LOW (ref 4.7–6.1)
RBC # FLD: 12.9 % — SIGNIFICANT CHANGE UP (ref 11.5–14.5)
SODIUM SERPL-SCNC: 140 MMOL/L — SIGNIFICANT CHANGE UP (ref 135–146)
WBC # BLD: 9.55 K/UL — SIGNIFICANT CHANGE UP (ref 4.8–10.8)
WBC # FLD AUTO: 9.55 K/UL — SIGNIFICANT CHANGE UP (ref 4.8–10.8)

## 2025-05-12 RX ADMIN — POLYETHYLENE GLYCOL 3350 17 GRAM(S): 17 POWDER, FOR SOLUTION ORAL at 17:19

## 2025-05-12 RX ADMIN — RIVASTIGMINE 1 PATCH: 13.3 PATCH, EXTENDED RELEASE TRANSDERMAL at 05:51

## 2025-05-12 RX ADMIN — ENTACAPONE 200 MILLIGRAM(S): 200 TABLET, FILM COATED ORAL at 12:03

## 2025-05-12 RX ADMIN — ENOXAPARIN SODIUM 40 MILLIGRAM(S): 100 INJECTION SUBCUTANEOUS at 14:23

## 2025-05-12 RX ADMIN — ATORVASTATIN CALCIUM 80 MILLIGRAM(S): 80 TABLET, FILM COATED ORAL at 21:45

## 2025-05-12 RX ADMIN — MIDODRINE HYDROCHLORIDE 10 MILLIGRAM(S): 5 TABLET ORAL at 21:45

## 2025-05-12 RX ADMIN — Medication 3 MILLIGRAM(S): at 21:44

## 2025-05-12 RX ADMIN — POLYETHYLENE GLYCOL 3350 17 GRAM(S): 17 POWDER, FOR SOLUTION ORAL at 05:52

## 2025-05-12 RX ADMIN — Medication 2 TABLET(S): at 17:18

## 2025-05-12 RX ADMIN — Medication 1 APPLICATION(S): at 05:55

## 2025-05-12 RX ADMIN — MIDODRINE HYDROCHLORIDE 10 MILLIGRAM(S): 5 TABLET ORAL at 14:23

## 2025-05-12 RX ADMIN — CLOPIDOGREL BISULFATE 75 MILLIGRAM(S): 75 TABLET, FILM COATED ORAL at 12:03

## 2025-05-12 RX ADMIN — LEVETIRACETAM 500 MILLIGRAM(S): 10 INJECTION, SOLUTION INTRAVENOUS at 17:18

## 2025-05-12 RX ADMIN — Medication 2 TABLET(S): at 05:52

## 2025-05-12 RX ADMIN — ENTACAPONE 200 MILLIGRAM(S): 200 TABLET, FILM COATED ORAL at 17:18

## 2025-05-12 RX ADMIN — ENTACAPONE 200 MILLIGRAM(S): 200 TABLET, FILM COATED ORAL at 05:51

## 2025-05-12 RX ADMIN — Medication 2 TABLET(S): at 21:45

## 2025-05-12 RX ADMIN — LEVETIRACETAM 500 MILLIGRAM(S): 10 INJECTION, SOLUTION INTRAVENOUS at 05:51

## 2025-05-12 RX ADMIN — Medication 1 APPLICATION(S): at 17:18

## 2025-05-12 RX ADMIN — Medication 2 TABLET(S): at 12:03

## 2025-05-12 RX ADMIN — Medication 1 TABLET(S): at 21:45

## 2025-05-12 RX ADMIN — Medication 81 MILLIGRAM(S): at 12:03

## 2025-05-12 RX ADMIN — MIRTAZAPINE 7.5 MILLIGRAM(S): 30 TABLET, FILM COATED ORAL at 21:44

## 2025-05-12 RX ADMIN — MIDODRINE HYDROCHLORIDE 10 MILLIGRAM(S): 5 TABLET ORAL at 05:51

## 2025-05-12 RX ADMIN — RIVASTIGMINE 1 PATCH: 13.3 PATCH, EXTENDED RELEASE TRANSDERMAL at 05:48

## 2025-05-12 RX ADMIN — RIVASTIGMINE 1 PATCH: 13.3 PATCH, EXTENDED RELEASE TRANSDERMAL at 06:52

## 2025-05-12 RX ADMIN — Medication 1 APPLICATION(S): at 05:51

## 2025-05-12 RX ADMIN — ENTACAPONE 200 MILLIGRAM(S): 200 TABLET, FILM COATED ORAL at 21:45

## 2025-05-12 NOTE — PROGRESS NOTE ADULT - SUBJECTIVE AND OBJECTIVE BOX
Patient is a 74 y old male who presents with a chief complaint of Rehab for bilateral  acute ischemic strokes  / aphasia, right facial droop /  Parkinson's disease (01 May 2025 11:31)    HPI:  75 y/o M with PMHx of Parkinson's, HTN, DM, gout, seizures on Keppra, h/o ischemic CVA x3 last stroke in January 2025 with residual expressive aphasia/dysarthria/R facial droop, who presented to the ED on 6/23 with severe expressive aphasia, LKW 9:30 pm. NIHSS 10 upon arrival for severe aphasia/dysarthria as pt nonverbal, intermittently able to follow commands, baseline R facial droop, LOC questions. mRs 1. NI alert activated 14:02. CTH negative, CTA H&N showed focal occlusion in the M2/M3 junction of the superior division of the left MCA, new since the prior CTA from 1/20/2023. New multifocal severe stenosis in the left A3 FITO. Stable moderate stenosis in the proximal right M1 MCA. New diffuse moderate to severe irregular stenosis in the bilateral PCAs, left worse than right. CTP with 41 mL of delayed perfusion in the left frontal lobe correlating with the CTA finding. No tnk as Pt out of window. NI actual activated. During angiogram, no occlusion observed to perform thrombectomy on however diffuse ICAD vs. vasospasm was seen in the left ICA for which he received 10mg IA Verapamil with improvement in vessel narrowing.  Post-op NIH 9. Exam improved to NIH 5 w/ BP augmentation. Pt was admitted to NSICU for BP augmentation following stroke  While in the NCC, remained on IV levophed for hemodynamic augmentation, tapering down; NIHSS was stable 5-6. On 4/27: aphasia worsened, stroke code was called. Urgent CTH obtained does not show any changes from the prior scan. Now downgraded to stroke unit on 4/29. Currently, Pt with NIHSS of 9 for LOC, right facial, Mute with severe expressive aphasia, can intermittently follow simple commands but not complex commands. During this hospital course, patient had scattered multiple foci of acute ischemic infarcts in multiple vascular territories (right subinsular region, bilateral thalami, right basal ganglia, bilateral medial temporal lobes and left cerebellum). Etiology likely ICAD as seen on MRI.     Imaging:  CT Head: No evidence of acute transcortical infarct, acute intracranial hemorrhage, or mass effect.  MR Head Non Contrast: Scattered multiple foci of acute infarcts in multiple vascular territories as described. No mass effect or midline shift. Multiple tiny foci showing signal dropout/blooming artifact on susceptibility weighted images at right subinsular region, bilateral thalami, right basal ganglia, bilateral medial temporal lobes and left cerebellum, probably representing microhemorrhages.  CT Angio Head and Neck: Focal occlusion in the M2/M3 junction of the superior division of the left MCA, new since the prior CTA from 1/20/2023. New multifocal severe stenosis in the left A3 FITO. Stable moderate stenosis in the proximal right M1 MCA. New diffuse moderate to severe irregular stenosis in the bilateral PCAs, left worse than right. Stable severe left/moderate right atherosclerotic stenosis in the V4 vertebral arteries.    Prior to admission, the patient was independent in ADLs and ambulation without an assistive device living in an assited living facility. Patient now requires assistance with ambulation and ADLs due to acute infarcts in multiple vascular territories (right subinsular region, bilateral thalami, right basal ganglia, bilateral medial temporal lobes and left cerebellum). Therefore, there is a significant functional decline from baseline. Patients also with medical comorbidities of seizures, parkinsons, HTN, DM, requiring medication management and monitoring of vitals by Physiatry team and nursing. To return home it is in the patient’s best interest to have acute inpatient rehabilitative services to undergo intensive interdisciplinary therapy. Furthermore, the patient is motivated and is able to tolerate up to 3 hours of daily therapy for 5-6 days a week for a total of 15 hours a week. Evaluated by Physiatry and deemed to be an excellent candidate for admission to  for acute inpatient rehab. Admitted to Acute Rehab on 5/1/25.    Pt seen and examined by PT, OT, ST and Physiatry and is currently functioning at modA bed mobility, maxA transfers, ambulates 5 ft RW modA, then 35 ft RW Reza, dressing with modA. On minced and moist diet for dysphagia.    LS: lives in assisted living facility with private A  PLOF: unable to confirm due to patient aphasia, as of 2/2025 discharged from acute rehab previously functioning at bed mobility supervision; Transfers sup/touchA-partialA; ambulation 150 ft x2/RW/touchA; UBD partialA; LBD partialA (01 May 2025 11:31)    TODAY'S SUBJECTIVE & REVIEW OF SYMPTOMS:  Patient seen and examined. No acute overnight events. Pt in good spirits without active complaints. Participating and tolerating therapy.   Vitals and labs reviewed. Voiding spontaneously. Having regular bowel movements. Neuro stable. Nonverbal but nodding yes/ no and following commands more consistently.    CLOF:  eating - partial-modA  UBD - partialA  LBD - partialA  transfers- partial A/ CG  toilet- partial-modA  mobility- RW 50' partial A/ CG    Review of Systems: Unable to obtain due to mutism and inconsistent head nodding to questioning.  Denies CP, SOB, nausea, vomiting, headaches, pain, dysuria, constipation     PHYSICAL EXAM    Vital Signs Last 24 Hrs  T(C): 36.7 (12 May 2025 14:27), Max: 36.9 (11 May 2025 21:37)  T(F): 98 (12 May 2025 14:27), Max: 98.4 (11 May 2025 21:37)  HR: 66 (12 May 2025 14:27) (61 - 96)  BP: 148/85 (12 May 2025 14:27) (145/76 - 157/93)  BP(mean): 99 (12 May 2025 04:30) (99 - 106)  RR: 18 (12 May 2025 13:05) (18 - 18)  SpO2: 98% (12 May 2025 14:27) (92% - 98%)    Parameters below as of 12 May 2025 04:30  Patient On (Oxygen Delivery Method): room air      General:[ x  ] NAD, Resting Comfortable,   [   ] other:                                HEENT: [ x  ] NC/AT, EOMI, PERRL , Normal Conjunctiva,   [   ] other:  Cardio: [  x ] RRR, no murmur,   [   ] other:                              Pulm: [  x ] No Respiratory Distress,  Lungs CTAB,   [   ] other:                       Abdomen: [ x  ]ND/NT, Soft,   [   ] other:    : [ x  ] NO LAM CATHETER, [   ] LAM CATHETER- no meatal tear, no discharge, [   ] other:                                            MSK: [  x ] No joint swelling, Full ROM,   [   ] other:                                         Ext: [ x  ]No C/C/E, No calf tenderness,   [ X  ]other:  RUE edema  Skin: [ x  ]intact,   [   ] other:                                                                   Neurological Examination:  Cognitive: [    ] AAO x 3,   [  x  ]  other: awake, alert, can nod head but inconsistent with correct answer and follow simple commands less than 50% of time, perseveraton                                                                      Attention:  [ X   ] intact,   [    ]  other: can attend to interviewer on both right and left sides. Needs redirection.                           Memory: [    ] intact,    [  X  ]  other: unable to assess 2/2 aphasia   Mood/Affect: [  X  ] wnl,    [    ]  other:                                                                             Communication: [    ]Fluent, no dysarthria, following commands:  [  X  ] other: mute, expressive>receptive aphasia, follows simple commands less than 50% of time with frequent redirection   CN II - XII:  [     ] intact,  [  X  ] other: Right lower facial droop, remainder grossly intact visual fields appear intact eyes tracking to stimuli   Motor:   RIGHT UE: [ X  ] WNL,  [   ] other:   LEFT    UE: [ X  ] WNL,  [   ] other:  RIGHT LE: [ X  ] WNL,  [  X ] other: 5-/5 HF   LEFT    LE: [ X  ] WNL,  [  X ] other: 5-/5 HF    Tone: [  X  ] wnl,   [    ]  other:  DTRs: [    ]symmetric, [  x ] other: R patellar reflex brisk 3+  Coordination:   [  X  ] intact,   [  X  ] other: slow to initiate movement may be 2/2 mental status                                                                        Sensory: [  X  ] Intact to light touch, (moves extremity when asked to after touching limb)   [    ] other:      MEDICATIONS  (STANDING):  aspirin  chewable 81 milliGRAM(s) Oral daily  atorvastatin 80 milliGRAM(s) Oral at bedtime  bacitracin   Ointment 1 Application(s) Topical two times a day  carbidopa/levodopa  25/250 2 Tablet(s) Oral <User Schedule>  carbidopa/levodopa CR 50/200 1 Tablet(s) Oral <User Schedule>  chlorhexidine 2% Cloths 1 Application(s) Topical <User Schedule>  clopidogrel Tablet 75 milliGRAM(s) Oral daily  enoxaparin Injectable 40 milliGRAM(s) SubCutaneous <User Schedule>  entacapone 200 milliGRAM(s) Oral <User Schedule>  levETIRAcetam 500 milliGRAM(s) Oral two times a day  melatonin 3 milliGRAM(s) Oral at bedtime  midodrine 10 milliGRAM(s) Oral every 8 hours  mirtazapine 7.5 milliGRAM(s) Oral at bedtime  polyethylene glycol 3350 17 Gram(s) Oral two times a day  rivastigmine patch  9.5 mG/24 Hr(s) 1 Patch Transdermal every 24 hours  senna 2 Tablet(s) Oral at bedtime    MEDICATIONS  (PRN):  acetaminophen     Tablet .. 650 milliGRAM(s) Oral every 6 hours PRN Temp greater or equal to 38C (100.4F), Mild Pain (1 - 3)        RECENT LABS:                          15.3   9.55  )-----------( 188      ( 12 May 2025 06:40 )             44.7     05-12    140  |  105  |  13  ----------------------------<  78  4.6   |  25  |  0.9    Ca    9.2      12 May 2025 06:40  Mg     2.3     05-12    TPro  6.2  /  Alb  3.8  /  TBili  0.9  /  DBili  x   /  AST  63[H]  /  ALT  15  /  AlkPhos  106  05-12

## 2025-05-12 NOTE — PROGRESS NOTE ADULT - ASSESSMENT
Patient is a 75 y/o M with PMHx of Parkinson's, HTN, DM, gout, seizures on keppra, prior ischemic stroke x3 most recently January 2025 with residual expressive aphasia/dysarthria/R facial droop, who presented to the ED on 6/23 with severe expressive aphasia, NIHSS 10 upon arrival for severe aphasia/dysarthria as pt nonverbal, intermittently able to follow commands, baseline R facial droop, LOC questions. mRs 1. NI alert activated, CTH negative, CTA H&N showed focal occlusion in the M2/M3 junction of the superior division of the left MCA, new since the prior CTA from 1/20/2023. New multifocal severe stenosis in the left A3 FITO. Stable moderate stenosis in the proximal right M1 MCA. New diffuse moderate to severe irregular stenosis in the bilateral PCAs, left worse than right. CTP with 41 mL of delayed perfusion in the left frontal lobe correlating with the CTA finding. No tnk as Pt out of window. NI activated, During angiogram, no occlusion observed to perform thrombectomy on however diffuse ICAD vs. vasospasm was seen in the left ICA for which he received 10mg IA Verapamil with improvement in vessel narrowing.  Post-op NIH 9. Exam improved to NIH 5 w/ BP augmentation. Pt was admitted to NSICU for BP augmentation following stroke  While in the NCC, remained on IV levophed for hemodynamic augmentation, tapering down; NIHSS was stable 5-6. On 4/27: aphasia worsened, stroke code was called. Urgent CTH obtained does not show any changes from the prior scan. Now downgraded to stroke unit on 4/29. Currently, Pt with NIHSS of 9 for LOC, right facial, Mute with severe expressive aphasia, can intermittently follow simple commands but not complex commands. Patient hemodynamically stable for admission to acute rehab on 5/1/25.     #Rehab for scattered multiple foci of acute ischemic infarcts in multiple vascular territories (right subinsular region, bilateral thalami, right basal ganglia, bilateral medial temporal lobes and left cerebellum) causing worsened aphasia, right facial droop, gait abnormality and functional decline  #History for L corona radiata stroke in January 2025 with residual expressive aphasia/dysarthria/R facial droop.  - Etiology likely ICAD  - CTA on arrival with left M2/M3 focal occlusion. Stable focal moderate stenosis of the R V4 segment and L V4.  - CT perfusion: with 41 mL of delayed perfusion in the left frontal lobe correlating with the CTA finding  - Cerebral Angiogram: no occlusion noted but possible vasospasm. s/p 10 mg IA verapamil   - ILR interrogated by EP: No AFib (ILR placed in 2025)   - TTE:  EF of 69 %., grade 1 diastolic dysfunction. Normal atria   - PRU on Plavix: 151- 4/27/25   - A1c 5.6,  LDL 44, TSH 0.88  - Home antiplatelets: Aspirin 81 mg and Plavix 75 mg   - C/w DAPT (Aspirin and Plavix) x 90 days, and then Aspirin monotherapy  - C/w Lipitor 80 mg qd  - continue midodrine 10 mg q8 hrs  - SBP goal 130-160   - PT/OT/SLP/neuropsych  -The patient requires acute rehab with 3 hours of daily therapies at least 5 out of 7 days and close physiatry follow up.     #History of DM2  - HbA1c 5.6 on no meds  - Blood sugars in good range on no meds  - can discontinue fingersticks    #Dysphagia  - 5/7 - underwent MBS, continue current diet- minced and moist, thins  - continue daily ST    #Hx of Seizure  - EEG 4/26/25 showed left hemispheric frontotemporally predominant focal cerebral dysfunction can be structural or functional in etiology.  - C/w home Keppra 500 mg bid  - asymptomatic    #Hx of Parkinson's Syndrome  - Home med: Sinemet 25/100 TID, Sinemet 50/200 qhs   - C/w 25/250 BID and 50/200 qhs   - C/w rivastigmine 9.5 mg patch qd  - C/w entacapone 200 mg QID (7am/12am/5pm/9pm)    #Chronic Thrombocytopenia - resolved  - Plts stable  - Continue to monitor     #RUE superficial thrombophlebitis  - RUE swelling  - Duplex showed: IMPRESSION: No evidence of right upper extremity deep venous thrombosis. Superficial thrombophlebitis of the right median antecubital vein  - Warm packs and supportive care - resolved    #Misc  -Pain control:  Tylenol  -GI/Bowel Mgmt: senna/Miralax  -Skin: intact  -Diet: minced and moist, thins, CC  On Remeron at bedtime    Precautions / PROPHYLAXIS:    - Falls, aspiration  -DVT Prophylaxis: Lovenox 40 mg QD .

## 2025-05-13 ENCOUNTER — TRANSCRIPTION ENCOUNTER (OUTPATIENT)
Age: 75
End: 2025-05-13

## 2025-05-13 RX ADMIN — MIDODRINE HYDROCHLORIDE 10 MILLIGRAM(S): 5 TABLET ORAL at 06:20

## 2025-05-13 RX ADMIN — Medication 81 MILLIGRAM(S): at 11:21

## 2025-05-13 RX ADMIN — ENTACAPONE 200 MILLIGRAM(S): 200 TABLET, FILM COATED ORAL at 21:54

## 2025-05-13 RX ADMIN — LEVETIRACETAM 500 MILLIGRAM(S): 10 INJECTION, SOLUTION INTRAVENOUS at 06:20

## 2025-05-13 RX ADMIN — Medication 1 APPLICATION(S): at 06:24

## 2025-05-13 RX ADMIN — MIDODRINE HYDROCHLORIDE 10 MILLIGRAM(S): 5 TABLET ORAL at 15:05

## 2025-05-13 RX ADMIN — ATORVASTATIN CALCIUM 80 MILLIGRAM(S): 80 TABLET, FILM COATED ORAL at 21:52

## 2025-05-13 RX ADMIN — Medication 1 APPLICATION(S): at 06:20

## 2025-05-13 RX ADMIN — Medication 1 TABLET(S): at 21:53

## 2025-05-13 RX ADMIN — CLOPIDOGREL BISULFATE 75 MILLIGRAM(S): 75 TABLET, FILM COATED ORAL at 11:21

## 2025-05-13 RX ADMIN — ENTACAPONE 200 MILLIGRAM(S): 200 TABLET, FILM COATED ORAL at 11:22

## 2025-05-13 RX ADMIN — ENTACAPONE 200 MILLIGRAM(S): 200 TABLET, FILM COATED ORAL at 06:20

## 2025-05-13 RX ADMIN — Medication 3 MILLIGRAM(S): at 21:54

## 2025-05-13 RX ADMIN — POLYETHYLENE GLYCOL 3350 17 GRAM(S): 17 POWDER, FOR SOLUTION ORAL at 17:26

## 2025-05-13 RX ADMIN — Medication 2 TABLET(S): at 06:20

## 2025-05-13 RX ADMIN — RIVASTIGMINE 1 PATCH: 13.3 PATCH, EXTENDED RELEASE TRANSDERMAL at 07:04

## 2025-05-13 RX ADMIN — ENOXAPARIN SODIUM 40 MILLIGRAM(S): 100 INJECTION SUBCUTANEOUS at 15:05

## 2025-05-13 RX ADMIN — LEVETIRACETAM 500 MILLIGRAM(S): 10 INJECTION, SOLUTION INTRAVENOUS at 17:25

## 2025-05-13 RX ADMIN — RIVASTIGMINE 1 PATCH: 13.3 PATCH, EXTENDED RELEASE TRANSDERMAL at 06:20

## 2025-05-13 RX ADMIN — Medication 2 TABLET(S): at 17:23

## 2025-05-13 RX ADMIN — RIVASTIGMINE 1 PATCH: 13.3 PATCH, EXTENDED RELEASE TRANSDERMAL at 07:00

## 2025-05-13 RX ADMIN — MIRTAZAPINE 7.5 MILLIGRAM(S): 30 TABLET, FILM COATED ORAL at 21:52

## 2025-05-13 RX ADMIN — RIVASTIGMINE 1 PATCH: 13.3 PATCH, EXTENDED RELEASE TRANSDERMAL at 19:08

## 2025-05-13 RX ADMIN — MIDODRINE HYDROCHLORIDE 10 MILLIGRAM(S): 5 TABLET ORAL at 21:53

## 2025-05-13 RX ADMIN — ENTACAPONE 200 MILLIGRAM(S): 200 TABLET, FILM COATED ORAL at 17:39

## 2025-05-13 RX ADMIN — POLYETHYLENE GLYCOL 3350 17 GRAM(S): 17 POWDER, FOR SOLUTION ORAL at 06:20

## 2025-05-13 RX ADMIN — Medication 1 APPLICATION(S): at 17:28

## 2025-05-13 RX ADMIN — Medication 2 TABLET(S): at 21:53

## 2025-05-13 RX ADMIN — Medication 2 TABLET(S): at 11:22

## 2025-05-13 NOTE — DISCHARGE NOTE PROVIDER - PROVIDER TOKENS
PROVIDER:[TOKEN:[[id number]],FOLLOWUP:[2 weeks]],PROVIDER:[TOKEN:[[id number]],FOLLOWUP:[2 weeks]] PROVIDER:[TOKEN:[[id number]],FOLLOWUP:[2 weeks]],PROVIDER:[TOKEN:[[id number]],SCHEDULEDAPPT:[06/04/2025],SCHEDULEDAPPTTIME:[09:00 AM]] PROVIDER:[TOKEN:[[id number]],FOLLOWUP:[2 weeks]],PROVIDER:[TOKEN:[[id number]],SCHEDULEDAPPT:[06/04/2025],SCHEDULEDAPPTTIME:[09:00 AM]],PROVIDER:[TOKEN:[[id number]]]

## 2025-05-13 NOTE — DISCHARGE NOTE PROVIDER - CARE PROVIDER_API CALL
Laverne San  Neurology  [address] [place] [address]  Phone: [phone]  Fax: [phone]  Follow Up Time: 2 weeks    Ha Tejada  Internal Medicine  [address] [place] [address] [address]  [place] [address]  Phone: [phone]  Fax: [phone]  Follow Up Time: 2 weeks   Ha Tejada  Internal Medicine  [address]  [place] [address]  Phone: [phone]  Fax: [phone]  Follow Up Time: 2 weeks    Laure Vazquez  NP in [address], [address]  [place] [address]  Phone: [phone]  Fax: [phone]  Scheduled Appointment: 06/04/2025 09:00 AM   Ha Tejada  Internal Medicine  [address] [facility], [address]  [place] [address]  Phone: [phone]  Fax: [phone]  Follow Up Time: 2 weeks    Laure Vazquez  NP in [address], [facility] [address]  [place] [address]  Phone: [phone]  Fax: [phone]  Scheduled Appointment: 06/04/2025 09:00 AM    Laverne San  Neurology  [address] [place] [address]  Phone: [phone]  Fax: [phone]  Follow Up Time:

## 2025-05-13 NOTE — DISCHARGE NOTE PROVIDER - NSDCCPCAREPLAN_GEN_ALL_CORE_FT
PRINCIPAL DISCHARGE DIAGNOSIS  Diagnosis: Ischemic stroke  Assessment and Plan of Treatment: Diagnosis: Ischemic stroke  Assessment and Plan of Treatment: You were requiring rrehabilitation therapy following a stroke found during your hospital stay. You are performing well with therapy so far and will continue therapy in a short term rehabilitation facility.   In an ischemic stroke, blood stops flowing to part of your brain because of a blockage in the blood vessel. This can damage areas in the brain that control other parts of the body.  Please take your [aspirin and plavix] for blood thinning and Atorvastatin for cholesterol medication/blood vessel protection as prescribed to prevent further strokes. Do not skip doses and do not run low on your medication. If you run low on your medication, please contact your doctor.  You will follow up outpatient with the stroke clinic upon discharge.   Doing your regular tasks may be difficult after you've had a stroke, but you can learn new ways to manage your daily activities. In fact, doing daily activities may help you to regain muscle strength. Be patient, give yourself time to adjust, and appreciate the progress you make. For example, when showering or bathing, test the water temperature with a hand or foot that was not affected by the stroke, use grab bars, a shower seat, a hand-held showerhead, etc. It is normal to feel fatigue after a stroke, while some days may be worse than others, you will continue to improve.  Call 911 right away if you have any of the following symptoms of another stroke:  B: Balance: Sudden: Dizziness, loss of balance, or a sense of falling, difficulty with coordinating movement  E: Eyes: Sudden double vision or trouble seeing in one or both eyes  F: Face: Sudden uneven face  A: Arms (Legs): Sudden weakness, tingling, or loss of feeling on one side of your face or body  S: Speech: Sudden trouble talking or slurred speech, sudden difficulty understanding others  T: Time: Please call 911 right away and go to the ER  •Sudden, severe headache  •Blackouts or seizures  To get better and follow your PCP     PRINCIPAL DISCHARGE DIAGNOSIS  Diagnosis: Ischemic stroke  Assessment and Plan of Treatment: Diagnosis: Ischemic stroke  Assessment and Plan of Treatment: You were requiring rrehabilitation therapy following a stroke found during your hospital stay. You are performing well with therapy so far and will continue therapy in a short term rehabilitation facility.   In an ischemic stroke, blood stops flowing to part of your brain because of a blockage in the blood vessel. This can damage areas in the brain that control other parts of the body.  Please take your [aspirin and plavix] Plavix for 90 days  from the onset of this stroke ythen continue asa monotherapy ,  for blood thinning and Atorvastatin for cholesterol medication/blood vessel protection as prescribed to prevent further strokes. Do not skip doses and do not run low on your medication. If you run low on your medication, please contact your doctor.  You will follow up outpatient with the stroke clinic upon discharge.   Doing your regular tasks may be difficult after you've had a stroke, but you can learn new ways to manage your daily activities. In fact, doing daily activities may help you to regain muscle strength. Be patient, give yourself time to adjust, and appreciate the progress you make. For example, when showering or bathing, test the water temperature with a hand or foot that was not affected by the stroke, use grab bars, a shower seat, a hand-held showerhead, etc. It is normal to feel fatigue after a stroke, while some days may be worse than others, you will continue to improve.  Call 911 right away if you have any of the following symptoms of another stroke:  B: Balance: Sudden: Dizziness, loss of balance, or a sense of falling, difficulty with coordinating movement  E: Eyes: Sudden double vision or trouble seeing in one or both eyes  F: Face: Sudden uneven face  A: Arms (Legs): Sudden weakness, tingling, or loss of feeling on one side of your face or body  S: Speech: Sudden trouble talking or slurred speech, sudden difficulty understanding others  T: Time: Please call 911 right away and go to the ER  •[place]      SECONDARY DISCHARGE DIAGNOSES  Diagnosis: History of seizure  Assessment and Plan of Treatment: You are on medications KEPPRA FOR THIS AND FOR PARKINSONS DISEASE YOU NEED TO CONTINUE TAKING  both long acting sinemet and comtan ( SHOULD BE  takeN with sinemet  TO EXTEND THE BENEFIT OF SINEMET, follow up with your neurologist for all this condition in 2  to 4 weeks , watch for new siieizures  if so please get medical attention immediatey    Diagnosis: Controlled diabetes mellitus  Assessment and Plan of Treatment: Your a1c a test to see how is your diabetes controlled showes it is 5 .2 , this means  You dont need  medictaions to control this condition , but watch your diet , weight loss most likely helped you to almost cure your diabetes ,

## 2025-05-13 NOTE — DISCHARGE NOTE PROVIDER - NSDCFUSCHEDAPPT_GEN_ALL_CORE_FT
[facility] Physician [facility]  CARDIOLOGY [address] [facility]  Scheduled Appointment: 05/28/2025     [facility]  CARDIOLOGY [address] [facility]  Scheduled Appointment: 05/28/2025    Laure Vazquez  [facility]  NEUROLOGY [address] [facility]  Scheduled Appointment: 06/04/2025

## 2025-05-13 NOTE — DISCHARGE NOTE PROVIDER - HOSPITAL COURSE
Patient is a 73 y/o M with PMHx of parkinsons, HTN, DM, gout, seizures on keppra, prior ischemic stroke x3 most recently January 2025 with residual expressive aphasia/dysarthria/R facial droop, who presented to the ED on 6/23 with severe expressive aphasia, NIHSS 10 upon arrival for severe aphasia/dysarthria as pt nonverbal, intermittently able to follow commands, baseline R facial droop, LOC questions. mRs 1. NI alert activated, CTH negative, CTA H&N showed focal occlusion in the M2/M3 junction of the superior division of the left MCA, new since the prior CTA from 1/20/2023. New multifocal severe stenosis in the left A3 FITO. Stable moderate stenosis in the proximal right M1 MCA. New diffuse moderate to severe irregular stenosis in the bilateral PCAs, left worse than right. CTP with 41 mL of delayed perfusion in the left frontal lobe correlating with the CTA finding. No tnk as Pt out of window. NI activated, During angiogram, no occlusion observed to perform thrombectomy on however diffuse ICAD vs. vasospasm was seen in the left ICA for which he received 10mg IA Verapamil with improvement in vessel narrowing.  Post-op NIH 9. Exam improved to NIH 5 w/ BP augmentation. Pt was admitted to NSICU for BP augmentation following stroke  While in the NCC, remained on IV levophed for hemodynamic augmentation, tapering down; NIHSS was stable 5-6. On 4/27: aphasia worsened, stroke code was called. Urgent CTH obtained does not show any changes from the prior scan. Now downgraded to stroke unit on 4/29. Currently, Pt with NIHSS of 9 for LOC, right facial, Mute with severe expressive aphasia, can intermittently follow simple commands but not complex commands. Patient hemodynamically stable for admission to acute rehab on 5/1/25.     #Rehab of scattered multiple foci of acute ischemic infarcts in multiple vascular territories (right subinsular region, bilateral thalami, right basal ganglia, bilateral medial temporal lobes and left cerebellum) causing worsened aphasia, right facial droop, gait abnormality and functional decline  #History of L bhatt radiate stroke in January 2025 with residual expressive aphasia/dysarthria/R facial droop.  - Etiology likely ICAD  - CTA on arrival with left M2/M3 focal occlusion. Stable focal moderate stenosis of the R V4 segment and L V4.  - CT perfusion: with 41 mL of delayed perfusion in the left frontal lobe correlating with the CTA finding  - Cerebral Angiogram: no occlusion noted but possible vasospasm. s/p 10mg IA verapamil   - ILR interrogated by EP: No AFib (ILR placed in 2025)   - TTE:  EF of 69 %., grade 1 diastolic dysfunction. Normal atria   - PRU on Plavix: 151- 4/27/25   - A1c 5.6,  LDL 44, TSH 0.88  - Home antiplatelets: Aspirin 81 mg and Plavix 75 mg   - C/w DAPT (Aspirin and Plavix) x 90 days, and then Aspirin monotherapy  - C/w lipitor 80mg qd  - increased Midodrine 10 mg q8 hrs on 5/1/25  - SBP goal 130-160   - PT/OT/SLP/neuropsych  -The patient requires acute rehab with 3 hours of daily therapies at least 5 out of 7 days and close physiatry follow up.     #Hx of Seizure  - EEG 4/26/25 showed left hemispheric frontotemporally predominant focal cerebral dysfunction can be structural or functional in etiology.  - C/w home Keppra 500 mg bid    #Hx of Parkinson  - Home med: Sinemet 25/100 TID, Sinemet 50/200 qhs   - C/w 25/250 TID and 50/200 qhs   - C/w rivastigmine 9.5mg patch qd  - C/w entacapone 200mg QID (7am/12am/5pm/9pm)    #Chronic Thrombocytopenia - resolved  - Plts stable  - Continue to monitor       #Misc  -Pain control:  tylenol  -GI/Bowel Mgmt: senna/miralax  -Skin: intact  -Diet: minced and moist, thins, CC    Precautions / PROPHYLAXIS:    - Falls, aspiration  -DVT Prophylaxis: Chemical: lovenox 40mg QD  and Mechanical: SCDs Patient is a 75 y/o M with PMHx of parkinsons, HTN, DM, gout, seizures on keppra, prior ischemic stroke x3 most recently January 2025 with residual expressive aphasia/dysarthria/R facial droop, who presented to the ED on 6/23 with severe expressive aphasia, NIHSS 10 upon arrival for severe aphasia/dysarthria as pt nonverbal, intermittently able to follow commands, baseline R facial droop, LOC questions. mRs 1. NI alert activated, CTH negative, CTA H&N showed focal occlusion in the M2/M3 junction of the superior division of the left MCA, new since the prior CTA from 1/20/2023. New multifocal severe stenosis in the left A3 FITO. Stable moderate stenosis in the proximal right M1 MCA. New diffuse moderate to severe irregular stenosis in the bilateral PCAs, left worse than right. CTP with 41 mL of delayed perfusion in the left frontal lobe correlating with the CTA finding. No tnk as Pt out of window. NI activated, During angiogram, no occlusion observed to perform thrombectomy on however diffuse ICAD vs. vasospasm was seen in the left ICA for which he received 10mg IA Verapamil with improvement in vessel narrowing.  Post-op NIH 9. Exam improved to NIH 5 w/ BP augmentation. Pt was admitted to NSICU for BP augmentation following stroke  While in the NCC, remained on IV levophed for hemodynamic augmentation, tapering down; NIHSS was stable 5-6. On 4/27: aphasia worsened, stroke code was called. Urgent CTH obtained does not show any changes from the prior scan. Now downgraded to stroke unit on 4/29. Currently, Pt with NIHSS of 9 for LOC, right facial, Mute with severe expressive aphasia, can intermittently follow simple commands but not complex commands. Patient hemodynamically stable for admission to acute rehab on 5/1/25.     #Rehab for scattered multiple foci of acute ischemic infarcts in multiple vascular territories (right subinsular region, bilateral thalami, right basal ganglia, bilateral medial temporal lobes and left cerebellum) causing worsened aphasia, right facial droop, gait abnormality and functional decline  #History for L corona radiata stroke in January 2025 with residual expressive aphasia/dysarthria/R facial droop.  - Etiology likely ICAD  - CTA on arrival with left M2/M3 focal occlusion. Stable focal moderate stenosis of the R V4 segment and L V4.  - CT perfusion: with 41 mL of delayed perfusion in the left frontal lobe correlating with the CTA finding  - Cerebral Angiogram: no occlusion noted but possible vasospasm. s/p 10 mg IA verapamil   - ILR interrogated by EP: No AFib (ILR placed in 2025)   - TTE:  EF of 69 %., grade 1 diastolic dysfunction. Normal atria   - PRU on Plavix: 151- 4/27/25   - A1c 5.6,  LDL 44, TSH 0.88  - Home antiplatelets: Aspirin 81 mg and Plavix 75 mg   - C/w DAPT (Aspirin and Plavix) x 90 days, and then Aspirin monotherapy  - C/w Lipitor 80 mg qd  - continue midodrine 10 mg q8 hrs  - SBP goal 130-160   - Intensive PT/OT/SLP/neuropsych completed    - Patient made gains, limited by aphasia. Appropriate for d/c to [facility] today as discussed with the patient and his HCP.  F/U in Stroke Clinic in 3 weeks. F/U medical doctor at [facility] and/ or PCP  continue current meds and diet.    #History of DM2  - HbA1c 5.6 on no meds  - Blood sugars in good range on no meds  - can discontinue fingersticks    #Dysphagia  - 5/7 - underwent MBS,   - continue current diet- minced and moist, thins  - continue daily ST    #Hx of Seizure  - EEG 4/26/25 showed left hemispheric frontotemporally predominant focal cerebral dysfunction can be structural or functional in etiology.  - C/w home Keppra 500 mg bid  - asymptomatic    #Hx of Parkinson's Syndrome  - Home med: Sinemet 25/100 TID, Sinemet 50/200 qhs   - C/w 25/250 BID and 50/200 qhs   - C/w rivastigmine 9.5 mg patch qd  - C/w entacapone 200 mg QID (7am/12am/5pm/9pm)    #Chronic Thrombocytopenia - resolved  - Plts stable  - Continue to monitor     #Misc-Diet: minced and moist, thins, CC    Precautions / PROPHYLAXIS:    - Falls, aspiration  -DVT Prophylaxis: Lovenox 40 mg QD Patient is a 75 y/o M with PMHx of parkinsons, HTN, DM, gout, seizures on keppra, prior ischemic stroke x3 most recently January 2025 with residual expressive aphasia/dysarthria/R facial droop, who presented to the ED on 6/23 with severe expressive aphasia, NIHSS 10 upon arrival for severe aphasia/dysarthria as pt nonverbal, intermittently able to follow commands, baseline R facial droop, LOC questions. mRs 1. NI alert activated, CTH negative, CTA H&N showed focal occlusion in the M2/M3 junction of the superior division of the left MCA, new since the prior CTA from 1/20/2023. New multifocal severe stenosis in the left A3 FITO. Stable moderate stenosis in the proximal right M1 MCA. New diffuse moderate to severe irregular stenosis in the bilateral PCAs, left worse than right. CTP with 41 mL of delayed perfusion in the left frontal lobe correlating with the CTA finding. No tnk as Pt out of window. NI activated, During angiogram, no occlusion observed to perform thrombectomy on however diffuse ICAD vs. vasospasm was seen in the left ICA for which he received 10mg IA Verapamil with improvement in vessel narrowing.  Post-op NIH 9. Exam improved to NIH 5 w/ BP augmentation. Pt was admitted to NSICU for BP augmentation following stroke  While in the NCC, remained on IV levophed for hemodynamic augmentation, tapering down; NIHSS was stable 5-6. On 4/27: aphasia worsened, stroke code was called. Urgent CTH obtained does not show any changes from the prior scan. Now downgraded to stroke unit on 4/29. Currently, Pt with NIHSS of 9 for LOC, right facial, Mute with severe expressive aphasia, can intermittently follow simple commands but not complex commands. Patient hemodynamically stable for admission to acute rehab on 5/1/25.     #Rehab for scattered multiple foci of acute ischemic infarcts in multiple vascular territories (right subinsular region, bilateral thalami, right basal ganglia, bilateral medial temporal lobes and left cerebellum) causing worsened aphasia, right facial droop, gait abnormality and functional decline  #History for L corona radiata stroke in January 2025 with residual expressive aphasia/dysarthria/R facial droop.  - Etiology likely ICAD  - CTA on arrival with left M2/M3 focal occlusion. Stable focal moderate stenosis of the R V4 segment and L V4.  - CT perfusion: with 41 mL of delayed perfusion in the left frontal lobe correlating with the CTA finding  - Cerebral Angiogram: no occlusion noted but possible vasospasm. s/p 10 mg IA verapamil   - ILR interrogated by EP: No AFib (ILR placed in 2025)   - TTE:  EF of 69 %., grade 1 diastolic dysfunction. Normal atria   - PRU on Plavix: 151- 4/27/25   - A1c 5.6,  LDL 44, TSH 0.88  - Home antiplatelets: Aspirin 81 mg and Plavix 75 mg   - C/w DAPT (Aspirin and Plavix) x 90 days, and then Aspirin monotherapy  - C/w Lipitor 80 mg qd  - continue midodrine 10 mg q8 hrs  - SBP goal 130-160   - Intensive PT/OT/SLP/neuropsych completed    - Patient made gains, limited by aphasia. Appropriate for d/c to [facility] today as discussed with the patient and his HCP.  F/U in Stroke Clinic in 3 weeks. F/U medical doctor at [facility] and/ or PCP  continue current meds and diet.    #History of DM2  - HbA1c 5.6 on no meds  - Blood sugars in good range on no meds  - can discontinue fingersticks    #Dysphagia  - 5/7 - underwent MBS,   - continue current diet- minced and moist, thins  - continue daily ST    #Hx of Seizure  - EEG 4/26/25 showed left hemispheric frontotemporally predominant focal cerebral dysfunction can be structural or functional in etiology.  - C/w home Keppra 500 mg bid  - asymptomatic    #Hx of Parkinson's Syndrome  - Home med: Sinemet 25/100 TID, Sinemet 50/200 qhs   - C/w 25/250 BID and 50/200 qhs   - C/w rivastigmine 9.5 mg patch qd  - C/w entacapone 200 mg QID (7am/12am/5pm/9pm)    #Chronic Thrombocytopenia - resolved  - Plts stable  - Continue to monitor     #Misc-Diet: minced and moist, thins, CC    Precautions / PROPHYLAXIS:    - Falls, aspiration  -DVT Prophylaxis: Lovenox 40 mg QD . He will be discharged to snf today with instructions to follow up with his neurologist for stroke  , seizures  and Parkinsons and pmd in 2  to 4 weeks ,

## 2025-05-13 NOTE — DISCHARGE NOTE PROVIDER - DISCHARGE DIET
Consistent Carbohydrate Diabetic Diets/Minced and Moist Diet Consistent Carbohydrate Diabetic Diets/Minced and Moist Diet/Nutrition Supplements

## 2025-05-13 NOTE — DISCHARGE NOTE PROVIDER - NSDCFUADDAPPT_GEN_ALL_CORE_FT
An appointment with neurology Np is made for you on 6/4 /25 at [address], please make sure to attend to this appointment .

## 2025-05-13 NOTE — DISCHARGE NOTE PROVIDER - NSDCMRMEDTOKEN_GEN_ALL_CORE_FT
acetaminophen 325 mg oral tablet: 2 tab(s) orally every 6 hours as needed for Temp greater or equal to 38C (100.4F), Mild Pain (1 - 3)  aspirin 81 mg oral delayed release tablet: 1 tab(s) orally once a day  atorvastatin 80 mg oral tablet: 1 tab(s) orally once a day (at bedtime)  carbidopa-levodopa 25 mg-250 mg oral tablet: 2 tab(s) orally 3 times a day  carbidopa-levodopa 50 mg-200 mg oral tablet, extended release: 1 tab(s) orally once a day (at bedtime)  clopidogrel 75 mg oral tablet: 1 tab(s) orally once a day  entacapone 200 mg oral tablet: 1 tab(s) orally 3 times a day at 7am, 12pm, and 5pm with Sinemet  levETIRAcetam 500 mg oral tablet: 1 tab(s) orally 2 times a day  Melatonin 3 mg oral tablet: 1 tab(s) orally once a day (at bedtime)  midodrine 10 mg oral tablet: 1 tab(s) orally every 8 hours Hold for SBP &gt;160  mirtazapine 7.5 mg oral tablet: 1 tab(s) orally once a day (at bedtime) MDD: 1  polyethylene glycol 3350 oral powder for reconstitution: 17 gram(s) orally 2 times a day  rivastigmine 9.5 mg/24 hr transdermal film, extended release: 1 patch transdermal every 24 hours  senna leaf extract oral tablet: 2 tab(s) orally once a day (at bedtime)   acetaminophen 325 mg oral tablet: 2 tab(s) orally every 6 hours as needed for Temp greater or equal to 38C (100.4F), Mild Pain (1 - 3)  aspirin 81 mg oral delayed release tablet: 1 tab(s) orally once a day  atorvastatin 80 mg oral tablet: 1 tab(s) orally once a day (at bedtime)  bacitracin 500 units/g topical ointment: Apply topically to affected area 2 times a day apply to head laceration   twice  a day  until laceration wound heals well .  carbidopa-levodopa 25 mg-250 mg oral tablet: 2 tab(s) orally 3 times a day Please give at  7am,12 noon and at 5 pm along with entacapone ( or comtan)  carbidopa-levodopa 50 mg-200 mg oral tablet, extended release: 1 tab(s) orally once a day (at bedtime) this dose at 22 00  clopidogrel 75 mg oral tablet: 1 tab(s) orally once a day  enoxaparin 40 mg/0.4 mL injectable solution: 0.4 milliliter(s) subcutaneous once a day please give at 2 pm daily  entacapone 200 mg oral tablet: 1 tab(s) orally 4 times a day at 7am, 12pm, and 5pmand 9 pm  with Sinemet please  levETIRAcetam 500 mg oral tablet: 1 tab(s) orally 2 times a day  Melatonin 3 mg oral tablet: 1 tab(s) orally once a day (at bedtime)  midodrine 10 mg oral tablet: 1 tab(s) orally every 8 hours Hold for SBP &gt;160  mirtazapine 7.5 mg oral tablet: 1 tab(s) orally once a day (at bedtime) MDD: 1  polyethylene glycol 3350 oral powder for reconstitution: 17 gram(s) orally 2 times a day  rivastigmine 9.5 mg/24 hr transdermal film, extended release: 1 patch transdermal every 24 hours  senna leaf extract oral tablet: 2 tab(s) orally once a day (at bedtime)

## 2025-05-13 NOTE — DISCHARGE NOTE PROVIDER - DETAILS OF MALNUTRITION DIAGNOSIS/DIAGNOSES
This patient has been assessed with a concern for Malnutrition and was treated during this hospitalization for the following Nutrition diagnosis/diagnoses:     -  05/02/2025: Moderate protein-calorie malnutrition

## 2025-05-13 NOTE — PROGRESS NOTE ADULT - SUBJECTIVE AND OBJECTIVE BOX
Patient is a 74 y old male who presents with a chief complaint of Rehab for bilateral  acute ischemic strokes  / aphasia, right facial droop /  Parkinson's disease (01 May 2025 11:31)    HPI:  75 y/o M with PMHx of Parkinson's, HTN, DM, gout, seizures on Keppra, h/o ischemic CVA x3 last stroke in January 2025 with residual expressive aphasia/dysarthria/R facial droop, who presented to the ED on 6/23 with severe expressive aphasia, LKW 9:30 pm. NIHSS 10 upon arrival for severe aphasia/dysarthria as pt nonverbal, intermittently able to follow commands, baseline R facial droop, LOC questions. mRs 1. NI alert activated 14:02. CTH negative, CTA H&N showed focal occlusion in the M2/M3 junction of the superior division of the left MCA, new since the prior CTA from 1/20/2023. New multifocal severe stenosis in the left A3 FITO. Stable moderate stenosis in the proximal right M1 MCA. New diffuse moderate to severe irregular stenosis in the bilateral PCAs, left worse than right. CTP with 41 mL of delayed perfusion in the left frontal lobe correlating with the CTA finding. No tnk as Pt out of window. NI actual activated. During angiogram, no occlusion observed to perform thrombectomy on however diffuse ICAD vs. vasospasm was seen in the left ICA for which he received 10mg IA Verapamil with improvement in vessel narrowing.  Post-op NIH 9. Exam improved to NIH 5 w/ BP augmentation. Pt was admitted to NSICU for BP augmentation following stroke  While in the NCC, remained on IV levophed for hemodynamic augmentation, tapering down; NIHSS was stable 5-6. On 4/27: aphasia worsened, stroke code was called. Urgent CTH obtained does not show any changes from the prior scan. Now downgraded to stroke unit on 4/29. Currently, Pt with NIHSS of 9 for LOC, right facial, Mute with severe expressive aphasia, can intermittently follow simple commands but not complex commands. During this hospital course, patient had scattered multiple foci of acute ischemic infarcts in multiple vascular territories (right subinsular region, bilateral thalami, right basal ganglia, bilateral medial temporal lobes and left cerebellum). Etiology likely ICAD as seen on MRI.     Imaging:  CT Head: No evidence of acute transcortical infarct, acute intracranial hemorrhage, or mass effect.  MR Head Non Contrast: Scattered multiple foci of acute infarcts in multiple vascular territories as described. No mass effect or midline shift. Multiple tiny foci showing signal dropout/blooming artifact on susceptibility weighted images at right subinsular region, bilateral thalami, right basal ganglia, bilateral medial temporal lobes and left cerebellum, probably representing microhemorrhages.  CT Angio Head and Neck: Focal occlusion in the M2/M3 junction of the superior division of the left MCA, new since the prior CTA from 1/20/2023. New multifocal severe stenosis in the left A3 FITO. Stable moderate stenosis in the proximal right M1 MCA. New diffuse moderate to severe irregular stenosis in the bilateral PCAs, left worse than right. Stable severe left/moderate right atherosclerotic stenosis in the V4 vertebral arteries.    Prior to admission, the patient was independent in ADLs and ambulation without an assistive device living in an assited living facility. Patient now requires assistance with ambulation and ADLs due to acute infarcts in multiple vascular territories (right subinsular region, bilateral thalami, right basal ganglia, bilateral medial temporal lobes and left cerebellum). Therefore, there is a significant functional decline from baseline. Patients also with medical comorbidities of seizures, parkinsons, HTN, DM, requiring medication management and monitoring of vitals by Physiatry team and nursing. To return home it is in the patient’s best interest to have acute inpatient rehabilitative services to undergo intensive interdisciplinary therapy. Furthermore, the patient is motivated and is able to tolerate up to 3 hours of daily therapy for 5-6 days a week for a total of 15 hours a week. Evaluated by Physiatry and deemed to be an excellent candidate for admission to  for acute inpatient rehab. Admitted to Acute Rehab on 5/1/25.    Pt seen and examined by PT, OT, ST and Physiatry and is currently functioning at modA bed mobility, maxA transfers, ambulates 5 ft RW modA, then 35 ft RW Reza, dressing with modA. On minced and moist diet for dysphagia.    LS: lives in assisted living facility with private A  PLOF: unable to confirm due to patient aphasia, as of 2/2025 discharged from acute rehab previously functioning at bed mobility supervision; Transfers sup/touchA-partialA; ambulation 150 ft x2/RW/touchA; UBD partialA; LBD partialA (01 May 2025 11:31)    TODAY'S SUBJECTIVE & REVIEW OF SYMPTOMS:  Patient seen and examined. No acute overnight events. Pt in good spirits without active complaints. Participating and tolerating therapy.   Vitals and labs reviewed. Voiding spontaneously. Having regular bowel movements. Neuro stable. Nonverbal but nodding yes/ no and following commands more consistently.    CLOF:  eating - partial-modA  UBD - partialA  LBD - partialA  transfers- partial A/ CG  toilet- partial-modA  mobility- RW 50' CG    Review of Systems: Unable to obtain due to mutism and inconsistent head nodding to questioning.  Denies CP, SOB, nausea, vomiting, headaches, pain, dysuria, constipation     PHYSICAL EXAM    Vital Signs Last 24 Hrs  T(C): 36.7 (13 May 2025 14:26), Max: 36.9 (12 May 2025 21:07)  T(F): 98.1 (13 May 2025 14:26), Max: 98.4 (12 May 2025 21:07)  HR: 75 (13 May 2025 14:26) (61 - 80)  BP: 130/81 (13 May 2025 14:26) (123/74 - 134/84)  BP(mean): 90 (13 May 2025 06:02) (90 - 90)  RR: 19 (13 May 2025 14:26) (18 - 19)  SpO2: 95% (13 May 2025 14:26) (95% - 97%)    Parameters below as of 13 May 2025 14:26  Patient On (Oxygen Delivery Method): room air      General:[ x  ] NAD, Resting Comfortable,   [   ] other:                                HEENT: [ x  ] NC/AT, EOMI, PERRL , Normal Conjunctiva,   [   ] other:  Cardio: [  x ] RRR, no murmur,   [   ] other:                              Pulm: [  x ] No Respiratory Distress,  Lungs CTAB,   [   ] other:                       Abdomen: [ x  ]ND/NT, Soft,   [   ] other:    : [ x  ] NO LAM CATHETER, [   ] LAM CATHETER- no meatal tear, no discharge, [   ] other:                                            MSK: [  x ] No joint swelling, Full ROM,   [   ] other:                                         Ext: [ x  ]No C/C/E, No calf tenderness,   [ X  ]other:  RUE edema  Skin: [ x  ]intact,   [   ] other:                                                                   Neurological Examination:  Cognitive: [    ] AAO x 3,   [  x  ]  other: awake, alert, can nod head but inconsistent with correct answer and follow simple commands less than 50% of time, perseveration                                                                      Attention:  [ X   ] intact,   [    ]  other: can attend to interviewer on both right and left sides. Needs redirection.                           Memory: [    ] intact,    [  X  ]  other: unable to assess 2/2 aphasia   Mood/Affect: [  X  ] wnl,    [    ]  other:                                                                             Communication: [    ]Fluent, no dysarthria, following commands:  [  X  ] other: mute, expressive>receptive aphasia, follows simple commands less than 50% of time with frequent redirection   CN II - XII:  [     ] intact,  [  X  ] other: Right lower facial droop, remainder grossly intact visual fields appear intact eyes tracking to stimuli   Motor:   RIGHT UE: [ X  ] WNL,  [   ] other:   LEFT    UE: [ X  ] WNL,  [   ] other:  RIGHT LE: [ X  ] WNL,  [  X ] other: 5-/5 HF   LEFT    LE: [ X  ] WNL,  [  X ] other: 5-/5 HF    Tone: [  X  ] wnl,   [    ]  other:  DTRs: [    ]symmetric, [  x ] other: R patellar reflex brisk 3+  Coordination:   [  X  ] intact,   [  X  ] other: slow to initiate movement may be 2/2 mental status                                                                        Sensory: [  X  ] Intact to light touch, (moves extremity when asked to after touching limb)   [    ] other:      MEDICATIONS  (STANDING):  aspirin  chewable 81 milliGRAM(s) Oral daily  atorvastatin 80 milliGRAM(s) Oral at bedtime  bacitracin   Ointment 1 Application(s) Topical two times a day  carbidopa/levodopa  25/250 2 Tablet(s) Oral <User Schedule>  carbidopa/levodopa CR 50/200 1 Tablet(s) Oral <User Schedule>  chlorhexidine 2% Cloths 1 Application(s) Topical <User Schedule>  clopidogrel Tablet 75 milliGRAM(s) Oral daily  enoxaparin Injectable 40 milliGRAM(s) SubCutaneous <User Schedule>  entacapone 200 milliGRAM(s) Oral <User Schedule>  levETIRAcetam 500 milliGRAM(s) Oral two times a day  melatonin 3 milliGRAM(s) Oral at bedtime  midodrine 10 milliGRAM(s) Oral every 8 hours  mirtazapine 7.5 milliGRAM(s) Oral at bedtime  polyethylene glycol 3350 17 Gram(s) Oral two times a day  rivastigmine patch  9.5 mG/24 Hr(s) 1 Patch Transdermal every 24 hours  senna 2 Tablet(s) Oral at bedtime    MEDICATIONS  (PRN):  acetaminophen     Tablet .. 650 milliGRAM(s) Oral every 6 hours PRN Temp greater or equal to 38C (100.4F), Mild Pain (1 - 3)        RECENT LABS:                          15.3   9.55  )-----------( 188      ( 12 May 2025 06:40 )             44.7     05-12    140  |  105  |  13  ----------------------------<  78  4.6   |  25  |  0.9    Ca    9.2      12 May 2025 06:40  Mg     2.3     05-12    TPro  6.2  /  Alb  3.8  /  TBili  0.9  /  DBili  x   /  AST  63[H]  /  ALT  15  /  AlkPhos  106  05-12

## 2025-05-13 NOTE — DISCHARGE NOTE PROVIDER - CARE PROVIDERS DIRECT ADDRESSES
[email or web address]

## 2025-05-13 NOTE — DISCHARGE NOTE PROVIDER - NSTOBACCOUSAGEY/N_GEN_A_CS
COVID-19 ruled out COVID-19 ruled out COVID-19 ruled out COVID-19 ruled out COVID-19 ruled out COVID-19 ruled out COVID-19 ruled out COVID-19 ruled out COVID-19 ruled out COVID-19 ruled out No COVID-19 ruled out

## 2025-05-13 NOTE — PROGRESS NOTE ADULT - ASSESSMENT
Patient is a 73 y/o M with PMHx of Parkinson's, HTN, DM, gout, seizures on keppra, prior ischemic stroke x3 most recently January 2025 with residual expressive aphasia/dysarthria/R facial droop, who presented to the ED on 6/23 with severe expressive aphasia, NIHSS 10 upon arrival for severe aphasia/dysarthria as pt nonverbal, intermittently able to follow commands, baseline R facial droop, LOC questions. mRs 1. NI alert activated, CTH negative, CTA H&N showed focal occlusion in the M2/M3 junction of the superior division of the left MCA, new since the prior CTA from 1/20/2023. New multifocal severe stenosis in the left A3 FITO. Stable moderate stenosis in the proximal right M1 MCA. New diffuse moderate to severe irregular stenosis in the bilateral PCAs, left worse than right. CTP with 41 mL of delayed perfusion in the left frontal lobe correlating with the CTA finding. No tnk as Pt out of window. NI activated, During angiogram, no occlusion observed to perform thrombectomy on however diffuse ICAD vs. vasospasm was seen in the left ICA for which he received 10mg IA Verapamil with improvement in vessel narrowing.  Post-op NIH 9. Exam improved to NIH 5 w/ BP augmentation. Pt was admitted to NSICU for BP augmentation following stroke  While in the NCC, remained on IV levophed for hemodynamic augmentation, tapering down; NIHSS was stable 5-6. On 4/27: aphasia worsened, stroke code was called. Urgent CTH obtained does not show any changes from the prior scan. Now downgraded to stroke unit on 4/29. Currently, Pt with NIHSS of 9 for LOC, right facial, Mute with severe expressive aphasia, can intermittently follow simple commands but not complex commands. Patient hemodynamically stable for admission to acute rehab on 5/1/25.     #Rehab for scattered multiple foci of acute ischemic infarcts in multiple vascular territories (right subinsular region, bilateral thalami, right basal ganglia, bilateral medial temporal lobes and left cerebellum) causing worsened aphasia, right facial droop, gait abnormality and functional decline  #History for L corona radiata stroke in January 2025 with residual expressive aphasia/dysarthria/R facial droop.  - Etiology likely ICAD  - CTA on arrival with left M2/M3 focal occlusion. Stable focal moderate stenosis of the R V4 segment and L V4.  - CT perfusion: with 41 mL of delayed perfusion in the left frontal lobe correlating with the CTA finding  - Cerebral Angiogram: no occlusion noted but possible vasospasm. s/p 10 mg IA verapamil   - ILR interrogated by EP: No AFib (ILR placed in 2025)   - TTE:  EF of 69 %., grade 1 diastolic dysfunction. Normal atria   - PRU on Plavix: 151- 4/27/25   - A1c 5.6,  LDL 44, TSH 0.88  - Home antiplatelets: Aspirin 81 mg and Plavix 75 mg   - C/w DAPT (Aspirin and Plavix) x 90 days, and then Aspirin monotherapy  - C/w Lipitor 80 mg qd  - continue midodrine 10 mg q8 hrs  - SBP goal 130-160   - PT/OT/SLP/neuropsych  - Patient made gains, limited by aphasia. Appropriate for d/c to STR in am. HCP in agreement    #History of DM2  - HbA1c 5.6 on no meds  - Blood sugars in good range on no meds  - can discontinue fingersticks    #Dysphagia  - 5/7 - underwent MBS, continue current diet- minced and moist, thins  - continue daily ST    #Hx of Seizure  - EEG 4/26/25 showed left hemispheric frontotemporally predominant focal cerebral dysfunction can be structural or functional in etiology.  - C/w home Keppra 500 mg bid  - asymptomatic    #Hx of Parkinson's Syndrome  - Home med: Sinemet 25/100 TID, Sinemet 50/200 qhs   - C/w 25/250 BID and 50/200 qhs   - C/w rivastigmine 9.5 mg patch qd  - C/w entacapone 200 mg QID (7am/12am/5pm/9pm)    #Chronic Thrombocytopenia - resolved  - Plts stable  - Continue to monitor     #RUE superficial thrombophlebitis  - RUE swelling  - Duplex showed: IMPRESSION: No evidence of right upper extremity deep venous thrombosis. Superficial thrombophlebitis of the right median antecubital vein  - Warm packs and supportive care - resolved    #Misc  -Pain control:  Tylenol  -GI/Bowel Mgmt: senna/Miralax  -Skin: intact  -Diet: minced and moist, thins, CC  On Remeron at bedtime    Precautions / PROPHYLAXIS:    - Falls, aspiration  -DVT Prophylaxis: Lovenox 40 mg QD .

## 2025-05-14 ENCOUNTER — TRANSCRIPTION ENCOUNTER (OUTPATIENT)
Age: 75
End: 2025-05-14

## 2025-05-14 VITALS
HEART RATE: 83 BPM | OXYGEN SATURATION: 97 % | DIASTOLIC BLOOD PRESSURE: 86 MMHG | SYSTOLIC BLOOD PRESSURE: 132 MMHG | RESPIRATION RATE: 18 BRPM | TEMPERATURE: 98 F

## 2025-05-14 RX ORDER — ENTACAPONE 200 MG/1
1 TABLET, FILM COATED ORAL
Qty: 120 | Refills: 0
Start: 2025-05-14 | End: 2025-06-12

## 2025-05-14 RX ORDER — BACITRACIN 500 UNIT/G
1 OINTMENT (GRAM) TOPICAL
Qty: 0 | Refills: 0 | DISCHARGE

## 2025-05-14 RX ORDER — ENOXAPARIN SODIUM 100 MG/ML
0.4 INJECTION SUBCUTANEOUS
Qty: 0 | Refills: 0 | DISCHARGE
Start: 2025-05-14

## 2025-05-14 RX ORDER — MIRTAZAPINE 30 MG/1
1 TABLET, FILM COATED ORAL
Qty: 30 | Refills: 0
Start: 2025-05-14 | End: 2025-06-12

## 2025-05-14 RX ORDER — CARBIDOPA/LEVODOPA 25MG-100MG
1 TABLET ORAL
Qty: 0 | Refills: 0 | DISCHARGE
Start: 2025-05-14

## 2025-05-14 RX ADMIN — Medication 1 APPLICATION(S): at 05:57

## 2025-05-14 RX ADMIN — CLOPIDOGREL BISULFATE 75 MILLIGRAM(S): 75 TABLET, FILM COATED ORAL at 11:44

## 2025-05-14 RX ADMIN — ENTACAPONE 200 MILLIGRAM(S): 200 TABLET, FILM COATED ORAL at 11:43

## 2025-05-14 RX ADMIN — Medication 81 MILLIGRAM(S): at 11:44

## 2025-05-14 RX ADMIN — ENTACAPONE 200 MILLIGRAM(S): 200 TABLET, FILM COATED ORAL at 05:48

## 2025-05-14 RX ADMIN — POLYETHYLENE GLYCOL 3350 17 GRAM(S): 17 POWDER, FOR SOLUTION ORAL at 05:50

## 2025-05-14 RX ADMIN — RIVASTIGMINE 1 PATCH: 13.3 PATCH, EXTENDED RELEASE TRANSDERMAL at 07:10

## 2025-05-14 RX ADMIN — RIVASTIGMINE 1 PATCH: 13.3 PATCH, EXTENDED RELEASE TRANSDERMAL at 05:49

## 2025-05-14 RX ADMIN — LEVETIRACETAM 500 MILLIGRAM(S): 10 INJECTION, SOLUTION INTRAVENOUS at 05:48

## 2025-05-14 RX ADMIN — Medication 2 TABLET(S): at 11:43

## 2025-05-14 RX ADMIN — Medication 2 TABLET(S): at 05:48

## 2025-05-14 NOTE — PROGRESS NOTE ADULT - SUBJECTIVE AND OBJECTIVE BOX
Patient is a 74 y old male who presents with a chief complaint of Rehab for bilateral  acute ischemic strokes  / aphasia, right facial droop /  Parkinson's disease (01 May 2025 11:31)    HPI:  73 y/o M with PMHx of Parkinson's, HTN, DM, gout, seizures on Keppra, h/o ischemic CVA x3 last stroke in January 2025 with residual expressive aphasia/dysarthria/R facial droop, who presented to the ED on 6/23 with severe expressive aphasia, LKW 9:30 pm. NIHSS 10 upon arrival for severe aphasia/dysarthria as pt nonverbal, intermittently able to follow commands, baseline R facial droop, LOC questions. mRs 1. NI alert activated 14:02. CTH negative, CTA H&N showed focal occlusion in the M2/M3 junction of the superior division of the left MCA, new since the prior CTA from 1/20/2023. New multifocal severe stenosis in the left A3 FITO. Stable moderate stenosis in the proximal right M1 MCA. New diffuse moderate to severe irregular stenosis in the bilateral PCAs, left worse than right. CTP with 41 mL of delayed perfusion in the left frontal lobe correlating with the CTA finding. No tnk as Pt out of window. NI actual activated. During angiogram, no occlusion observed to perform thrombectomy on however diffuse ICAD vs. vasospasm was seen in the left ICA for which he received 10mg IA Verapamil with improvement in vessel narrowing.  Post-op NIH 9. Exam improved to NIH 5 w/ BP augmentation. Pt was admitted to NSICU for BP augmentation following stroke  While in the NCC, remained on IV levophed for hemodynamic augmentation, tapering down; NIHSS was stable 5-6. On 4/27: aphasia worsened, stroke code was called. Urgent CTH obtained does not show any changes from the prior scan. Now downgraded to stroke unit on 4/29. Currently, Pt with NIHSS of 9 for LOC, right facial, Mute with severe expressive aphasia, can intermittently follow simple commands but not complex commands. During this hospital course, patient had scattered multiple foci of acute ischemic infarcts in multiple vascular territories (right subinsular region, bilateral thalami, right basal ganglia, bilateral medial temporal lobes and left cerebellum). Etiology likely ICAD as seen on MRI.     Imaging:  CT Head: No evidence of acute transcortical infarct, acute intracranial hemorrhage, or mass effect.  MR Head Non Contrast: Scattered multiple foci of acute infarcts in multiple vascular territories as described. No mass effect or midline shift. Multiple tiny foci showing signal dropout/blooming artifact on susceptibility weighted images at right subinsular region, bilateral thalami, right basal ganglia, bilateral medial temporal lobes and left cerebellum, probably representing microhemorrhages.  CT Angio Head and Neck: Focal occlusion in the M2/M3 junction of the superior division of the left MCA, new since the prior CTA from 1/20/2023. New multifocal severe stenosis in the left A3 FITO. Stable moderate stenosis in the proximal right M1 MCA. New diffuse moderate to severe irregular stenosis in the bilateral PCAs, left worse than right. Stable severe left/moderate right atherosclerotic stenosis in the V4 vertebral arteries.    Prior to admission, the patient was independent in ADLs and ambulation without an assistive device living in an assited living facility. Patient now requires assistance with ambulation and ADLs due to acute infarcts in multiple vascular territories (right subinsular region, bilateral thalami, right basal ganglia, bilateral medial temporal lobes and left cerebellum). Therefore, there is a significant functional decline from baseline. Patients also with medical comorbidities of seizures, parkinsons, HTN, DM, requiring medication management and monitoring of vitals by Physiatry team and nursing. To return home it is in the patient’s best interest to have acute inpatient rehabilitative services to undergo intensive interdisciplinary therapy. Furthermore, the patient is motivated and is able to tolerate up to 3 hours of daily therapy for 5-6 days a week for a total of 15 hours a week. Evaluated by Physiatry and deemed to be an excellent candidate for admission to  for acute inpatient rehab. Admitted to Acute Rehab on 5/1/25.    Pt seen and examined by PT, OT, ST and Physiatry and is currently functioning at modA bed mobility, maxA transfers, ambulates 5 ft RW modA, then 35 ft RW Reza, dressing with modA. On minced and moist diet for dysphagia.    LS: lives in assisted living facility with private A  PLOF: unable to confirm due to patient aphasia, as of 2/2025 discharged from acute rehab previously functioning at bed mobility supervision; Transfers sup/touchA-partialA; ambulation 150 ft x2/RW/touchA; UBD partialA; LBD partialA (01 May 2025 11:31)    TODAY'S SUBJECTIVE & REVIEW OF SYMPTOMS:  Patient seen and examined. No acute overnight events. Pt in good spirits without active complaints. Participating and tolerating therapy.   Vitals and labs reviewed. Voiding spontaneously. Having regular bowel movements. Neuro stable. Nonverbal but nodding yes/ no and following commands more consistently.    CLOF:  eating - partial-modA  UBD - partialA  LBD - partialA  transfers- partial A/ CG  toilet- partial-modA  mobility- RW 50' CG    Review of Systems: Unable to obtain due to mutism and inconsistent head nodding to questioning.  Denies CP, SOB, nausea, vomiting, headaches, pain, dysuria, constipation     PHYSICAL EXAM    Vital Signs Last 24 Hrs  T(C): 36.5 (14 May 2025 12:43), Max: 36.7 (13 May 2025 14:26)  T(F): 97.7 (14 May 2025 12:43), Max: 98.1 (13 May 2025 14:26)  HR: 83 (14 May 2025 12:43) (56 - 83)  BP: 132/86 (14 May 2025 12:43) (130/78 - 168/79)  BP(mean): --  RR: 18 (14 May 2025 12:43) (18 - 19)  SpO2: 97% (14 May 2025 12:43) (95% - 97%)    Parameters below as of 14 May 2025 12:43  Patient On (Oxygen Delivery Method): room air        General:[ x  ] NAD, Resting Comfortable,   [   ] other:                                HEENT: [ x  ] NC/AT, EOMI, PERRL , Normal Conjunctiva,   [   ] other:  Cardio: [  x ] RRR, no murmur,   [   ] other:                              Pulm: [  x ] No Respiratory Distress,  Lungs CTAB,   [   ] other:                       Abdomen: [ x  ]ND/NT, Soft,   [   ] other:    : [ x  ] NO LAM CATHETER, [   ] LAM CATHETER- no meatal tear, no discharge, [   ] other:                                            MSK: [  x ] No joint swelling, Full ROM,   [   ] other:                                         Ext: [ x  ]No C/C/E, No calf tenderness,   [ X  ]other:  RUE edema  Skin: [ x  ]intact,   [   ] other:                                                                   Neurological Examination:  Cognitive: [    ] AAO x 3,   [  x  ]  other: awake, alert, can nod head but inconsistent with correct answer and follow simple commands less than 50% of time, perseveration                                                                      Attention:  [ X   ] intact,   [    ]  other: can attend to interviewer on both right and left sides. Needs redirection.                           Memory: [    ] intact,    [  X  ]  other: unable to assess 2/2 aphasia   Mood/Affect: [  X  ] wnl,    [    ]  other:                                                                             Communication: [    ]Fluent, no dysarthria, following commands:  [  X  ] other: mute, expressive>receptive aphasia, follows simple commands less than 50% of time with frequent redirection   CN II - XII:  [     ] intact,  [  X  ] other: Right lower facial droop, remainder grossly intact visual fields appear intact eyes tracking to stimuli   Motor:   RIGHT UE: [ X  ] WNL,  [   ] other:   LEFT    UE: [ X  ] WNL,  [   ] other:  RIGHT LE: [ X  ] WNL,  [  X ] other: 5-/5 HF   LEFT    LE: [ X  ] WNL,  [  X ] other: 5-/5 HF    Tone: [  X  ] wnl,   [    ]  other:  DTRs: [    ]symmetric, [  x ] other: R patellar reflex brisk 3+  Coordination:   [  X  ] intact,   [  X  ] other: slow to initiate movement may be 2/2 mental status                                                                        Sensory: [  X  ] Intact to light touch, (moves extremity when asked to after touching limb)   [    ] other:      MEDICATIONS  (STANDING):  aspirin  chewable 81 milliGRAM(s) Oral daily  atorvastatin 80 milliGRAM(s) Oral at bedtime  bacitracin   Ointment 1 Application(s) Topical two times a day  carbidopa/levodopa  25/250 2 Tablet(s) Oral <User Schedule>  carbidopa/levodopa CR 50/200 1 Tablet(s) Oral <User Schedule>  chlorhexidine 2% Cloths 1 Application(s) Topical <User Schedule>  clopidogrel Tablet 75 milliGRAM(s) Oral daily  enoxaparin Injectable 40 milliGRAM(s) SubCutaneous <User Schedule>  entacapone 200 milliGRAM(s) Oral <User Schedule>  levETIRAcetam 500 milliGRAM(s) Oral two times a day  melatonin 3 milliGRAM(s) Oral at bedtime  midodrine 10 milliGRAM(s) Oral every 8 hours  mirtazapine 7.5 milliGRAM(s) Oral at bedtime  polyethylene glycol 3350 17 Gram(s) Oral two times a day  rivastigmine patch  9.5 mG/24 Hr(s) 1 Patch Transdermal every 24 hours  senna 2 Tablet(s) Oral at bedtime    MEDICATIONS  (PRN):  acetaminophen     Tablet .. 650 milliGRAM(s) Oral every 6 hours PRN Temp greater or equal to 38C (100.4F), Mild Pain (1 - 3)          RECENT LABS:                          15.3   9.55  )-----------( 188      ( 12 May 2025 06:40 )             44.7     05-12    140  |  105  |  13  ----------------------------<  78  4.6   |  25  |  0.9    Ca    9.2      12 May 2025 06:40  Mg     2.3     05-12    TPro  6.2  /  Alb  3.8  /  TBili  0.9  /  DBili  x   /  AST  63[H]  /  ALT  15  /  AlkPhos  106  05-12

## 2025-05-14 NOTE — DISCHARGE NOTE NURSING/CASE MANAGEMENT/SOCIAL WORK - FINANCIAL ASSISTANCE
[facility] provides services at a reduced cost to those who are determined to be eligible through [facility]’s financial assistance program. Information regarding [facility]’s financial assistance program can be found by going to [email or web address] or by calling [phone].

## 2025-05-14 NOTE — PROGRESS NOTE ADULT - NUTRITIONAL ASSESSMENT
This patient has been assessed with a concern for Malnutrition and has been determined to have a diagnosis/diagnoses of Moderate protein-calorie malnutrition.    This patient is being managed with:   Diet Minced and Moist-  Consistent Carbohydrate {Evening Snack} (CSTCHOSN)  Supplement Feeding Modality:  Oral  Glucerna Shake Cans or Servings Per Day:  1       Frequency:  Two Times a day  Entered: May  1 2025  6:10PM  

## 2025-05-14 NOTE — DISCHARGE NOTE NURSING/CASE MANAGEMENT/SOCIAL WORK - PATIENT PORTAL LINK FT
You can access the FollowMyHealth Patient Portal offered by [facility] by registering at the following website: [email or web address]. By joining [facility]’s FollowMyHealth portal, you will also be able to view your health information using other applications (apps) compatible with our system.

## 2025-05-14 NOTE — PROGRESS NOTE ADULT - ASSESSMENT
Patient is a 75 y/o M with PMHx of Parkinson's, HTN, DM, gout, seizures on keppra, prior ischemic stroke x3 most recently January 2025 with residual expressive aphasia/dysarthria/R facial droop, who presented to the ED on 6/23 with severe expressive aphasia, NIHSS 10 upon arrival for severe aphasia/dysarthria as pt nonverbal, intermittently able to follow commands, baseline R facial droop, LOC questions. mRs 1. NI alert activated, CTH negative, CTA H&N showed focal occlusion in the M2/M3 junction of the superior division of the left MCA, new since the prior CTA from 1/20/2023. New multifocal severe stenosis in the left A3 FITO. Stable moderate stenosis in the proximal right M1 MCA. New diffuse moderate to severe irregular stenosis in the bilateral PCAs, left worse than right. CTP with 41 mL of delayed perfusion in the left frontal lobe correlating with the CTA finding. No tnk as Pt out of window. NI activated, During angiogram, no occlusion observed to perform thrombectomy on however diffuse ICAD vs. vasospasm was seen in the left ICA for which he received 10mg IA Verapamil with improvement in vessel narrowing.  Post-op NIH 9. Exam improved to NIH 5 w/ BP augmentation. Pt was admitted to NSICU for BP augmentation following stroke  While in the NCC, remained on IV levophed for hemodynamic augmentation, tapering down; NIHSS was stable 5-6. On 4/27: aphasia worsened, stroke code was called. Urgent CTH obtained does not show any changes from the prior scan. Now downgraded to stroke unit on 4/29. Currently, Pt with NIHSS of 9 for LOC, right facial, Mute with severe expressive aphasia, can intermittently follow simple commands but not complex commands. Patient hemodynamically stable for admission to acute rehab on 5/1/25.     #Rehab for scattered multiple foci of acute ischemic infarcts in multiple vascular territories (right subinsular region, bilateral thalami, right basal ganglia, bilateral medial temporal lobes and left cerebellum) causing worsened aphasia, right facial droop, gait abnormality and functional decline  #History for L corona radiata stroke in January 2025 with residual expressive aphasia/dysarthria/R facial droop.  - Etiology likely ICAD  - CTA on arrival with left M2/M3 focal occlusion. Stable focal moderate stenosis of the R V4 segment and L V4.  - CT perfusion: with 41 mL of delayed perfusion in the left frontal lobe correlating with the CTA finding  - Cerebral Angiogram: no occlusion noted but possible vasospasm. s/p 10 mg IA verapamil   - ILR interrogated by EP: No AFib (ILR placed in 2025)   - TTE:  EF of 69 %., grade 1 diastolic dysfunction. Normal atria   - PRU on Plavix: 151- 4/27/25   - A1c 5.6,  LDL 44, TSH 0.88  - Home antiplatelets: Aspirin 81 mg and Plavix 75 mg   - C/w DAPT (Aspirin and Plavix) x 90 days, and then Aspirin monotherapy  - C/w Lipitor 80 mg qd  - continue midodrine 10 mg q8 hrs  - SBP goal 130-160   - Intensive PT/OT/SLP/neuropsych completed    - Patient made gains, limited by aphasia. Appropriate for d/c to STR today as discussed with the patient and his HCP.  F/U in Stroke Clinic in 3 weeks. F/U medical doctor at STR and/ or PCP  continue current meds and diet.    #History of DM2  - HbA1c 5.6 on no meds  - Blood sugars in good range on no meds  - can discontinue fingersticks    #Dysphagia  - 5/7 - underwent MBS,   - continue current diet- minced and moist, thins  - continue daily ST    #Hx of Seizure  - EEG 4/26/25 showed left hemispheric frontotemporally predominant focal cerebral dysfunction can be structural or functional in etiology.  - C/w home Keppra 500 mg bid  - asymptomatic    #Hx of Parkinson's Syndrome  - Home med: Sinemet 25/100 TID, Sinemet 50/200 qhs   - C/w 25/250 BID and 50/200 qhs   - C/w rivastigmine 9.5 mg patch qd  - C/w entacapone 200 mg QID (7am/12am/5pm/9pm)    #Chronic Thrombocytopenia - resolved  - Plts stable  - Continue to monitor     #Misc-Diet: minced and moist, thins, CC    Precautions / PROPHYLAXIS:    - Falls, aspiration  -DVT Prophylaxis: Lovenox 40 mg QD     For further summary of hospital course and d/c med rec and patient instructions, see Provider D/C Note, reviewed and signed by me.

## 2025-05-14 NOTE — DISCHARGE NOTE NURSING/CASE MANAGEMENT/SOCIAL WORK - NSDCVIVACCINE_GEN_ALL_CORE_FT
Tdap; 12-May-2020 22:10; Angelina Gomez (RN); [employer]; [id number] (Exp. Date: 19-Apr-2021); IntraMuscular; Deltoid Right.; 0.5 milliLiter(s); VIS (VIS Published: 09-May-2013, VIS Presented: 12-May-2020);

## 2025-05-14 NOTE — DISCHARGE NOTE NURSING/CASE MANAGEMENT/SOCIAL WORK - NSDCFUADDAPPT_GEN_ALL_CORE_FT
An appointment with neurology Np is made for you on 6/4 /25 at [address], please make sure to attend to this appointment .

## 2025-05-14 NOTE — PROGRESS NOTE ADULT - PROVIDER SPECIALTY LIST ADULT
Rehab Medicine

## 2025-05-20 DIAGNOSIS — G20.C PARKINSONISM, UNSPECIFIED: ICD-10-CM

## 2025-05-20 DIAGNOSIS — I69.322 DYSARTHRIA FOLLOWING CEREBRAL INFARCTION: ICD-10-CM

## 2025-05-20 DIAGNOSIS — Z79.82 LONG TERM (CURRENT) USE OF ASPIRIN: ICD-10-CM

## 2025-05-20 DIAGNOSIS — G40.909 EPILEPSY, UNSPECIFIED, NOT INTRACTABLE, WITHOUT STATUS EPILEPTICUS: ICD-10-CM

## 2025-05-20 DIAGNOSIS — M10.9 GOUT, UNSPECIFIED: ICD-10-CM

## 2025-05-20 DIAGNOSIS — Z90.89 ACQUIRED ABSENCE OF OTHER ORGANS: ICD-10-CM

## 2025-05-20 DIAGNOSIS — E44.0 MODERATE PROTEIN-CALORIE MALNUTRITION: ICD-10-CM

## 2025-05-20 DIAGNOSIS — I80.01 PHLEBITIS AND THROMBOPHLEBITIS OF SUPERFICIAL VESSELS OF RIGHT LOWER EXTREMITY: ICD-10-CM

## 2025-05-20 DIAGNOSIS — I69.391 DYSPHAGIA FOLLOWING CEREBRAL INFARCTION: ICD-10-CM

## 2025-05-20 DIAGNOSIS — R29.709 NIHSS SCORE 9: ICD-10-CM

## 2025-05-20 DIAGNOSIS — I69.392 FACIAL WEAKNESS FOLLOWING CEREBRAL INFARCTION: ICD-10-CM

## 2025-05-20 DIAGNOSIS — I69.398 OTHER SEQUELAE OF CEREBRAL INFARCTION: ICD-10-CM

## 2025-05-20 DIAGNOSIS — I10 ESSENTIAL (PRIMARY) HYPERTENSION: ICD-10-CM

## 2025-05-20 DIAGNOSIS — R26.89 OTHER ABNORMALITIES OF GAIT AND MOBILITY: ICD-10-CM

## 2025-05-20 DIAGNOSIS — I69.320 APHASIA FOLLOWING CEREBRAL INFARCTION: ICD-10-CM

## 2025-06-04 ENCOUNTER — APPOINTMENT (OUTPATIENT)
Dept: NEUROLOGY | Facility: CLINIC | Age: 75
End: 2025-06-04

## 2025-06-04 ENCOUNTER — APPOINTMENT (OUTPATIENT)
Dept: NEUROLOGY | Facility: CLINIC | Age: 75
End: 2025-06-04
Payer: MEDICARE

## 2025-06-04 VITALS
HEART RATE: 78 BPM | BODY MASS INDEX: 23.49 KG/M2 | HEIGHT: 68 IN | WEIGHT: 155 LBS | DIASTOLIC BLOOD PRESSURE: 82 MMHG | TEMPERATURE: 97.6 F | OXYGEN SATURATION: 100 % | SYSTOLIC BLOOD PRESSURE: 129 MMHG

## 2025-06-04 PROCEDURE — 99215 OFFICE O/P EST HI 40 MIN: CPT

## 2025-06-04 RX ORDER — CARBIDOPA AND LEVODOPA 25; 250 MG/1; MG/1
25-250 TABLET ORAL TWICE DAILY
Refills: 0 | Status: ACTIVE | COMMUNITY

## 2025-06-04 RX ORDER — CARBIDOPA AND LEVODOPA 50; 200 MG/1; MG/1
50-200 TABLET, EXTENDED RELEASE ORAL
Refills: 0 | Status: ACTIVE | COMMUNITY

## 2025-06-04 RX ORDER — METOPROLOL TARTRATE 25 MG/1
25 TABLET ORAL TWICE DAILY
Refills: 0 | Status: ACTIVE | COMMUNITY

## 2025-06-30 ENCOUNTER — INPATIENT (INPATIENT)
Facility: HOSPITAL | Age: 75
LOS: 2 days | Discharge: HOME | DRG: 92 | End: 2025-07-03
Attending: INTERNAL MEDICINE | Admitting: INTERNAL MEDICINE
Payer: MEDICARE

## 2025-06-30 VITALS
DIASTOLIC BLOOD PRESSURE: 73 MMHG | HEART RATE: 80 BPM | HEIGHT: 65 IN | RESPIRATION RATE: 20 BRPM | SYSTOLIC BLOOD PRESSURE: 128 MMHG | OXYGEN SATURATION: 99 % | TEMPERATURE: 98 F
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
## 2025-06-30 DIAGNOSIS — Z90.49 ACQUIRED ABSENCE OF OTHER SPECIFIED PARTS OF DIGESTIVE TRACT: Chronic | ICD-10-CM

## 2025-06-30 LAB
ALBUMIN SERPL ELPH-MCNC: 3.4 G/DL — LOW (ref 3.5–5.2)
ALP SERPL-CCNC: 77 U/L — SIGNIFICANT CHANGE UP (ref 30–115)
ALT FLD-CCNC: 15 U/L — SIGNIFICANT CHANGE UP (ref 0–41)
ANION GAP SERPL CALC-SCNC: 11 MMOL/L — SIGNIFICANT CHANGE UP (ref 7–14)
AST SERPL-CCNC: 29 U/L — SIGNIFICANT CHANGE UP (ref 0–41)
BASE EXCESS BLDV CALC-SCNC: 3.5 MMOL/L — HIGH (ref -2–3)
BASOPHILS # BLD AUTO: 0.03 K/UL — SIGNIFICANT CHANGE UP (ref 0–0.2)
BASOPHILS NFR BLD AUTO: 0.3 % — SIGNIFICANT CHANGE UP (ref 0–1)
BILIRUB SERPL-MCNC: 1 MG/DL — SIGNIFICANT CHANGE UP (ref 0.2–1.2)
BUN SERPL-MCNC: 16 MG/DL — SIGNIFICANT CHANGE UP (ref 10–20)
CA-I SERPL-SCNC: 1.28 MMOL/L — SIGNIFICANT CHANGE UP (ref 1.15–1.33)
CALCIUM SERPL-MCNC: 8.7 MG/DL — SIGNIFICANT CHANGE UP (ref 8.4–10.5)
CHLORIDE SERPL-SCNC: 104 MMOL/L — SIGNIFICANT CHANGE UP (ref 98–110)
CO2 SERPL-SCNC: 26 MMOL/L — SIGNIFICANT CHANGE UP (ref 17–32)
CREAT SERPL-MCNC: 0.9 MG/DL — SIGNIFICANT CHANGE UP (ref 0.7–1.5)
EGFR: 90 ML/MIN/1.73M2 — SIGNIFICANT CHANGE UP
EGFR: 90 ML/MIN/1.73M2 — SIGNIFICANT CHANGE UP
EOSINOPHIL # BLD AUTO: 0.12 K/UL — SIGNIFICANT CHANGE UP (ref 0–0.7)
EOSINOPHIL NFR BLD AUTO: 1.2 % — SIGNIFICANT CHANGE UP (ref 0–8)
ETHANOL SERPL-MCNC: <10 MG/DL — SIGNIFICANT CHANGE UP
GAS PNL BLDV: 136 MMOL/L — SIGNIFICANT CHANGE UP (ref 136–145)
GAS PNL BLDV: SIGNIFICANT CHANGE UP
GAS PNL BLDV: SIGNIFICANT CHANGE UP
GLUCOSE BLDC GLUCOMTR-MCNC: 91 MG/DL — SIGNIFICANT CHANGE UP (ref 70–99)
GLUCOSE SERPL-MCNC: 90 MG/DL — SIGNIFICANT CHANGE UP (ref 70–99)
HCO3 BLDV-SCNC: 32 MMOL/L — HIGH (ref 22–29)
HCT VFR BLD CALC: 40 % — LOW (ref 42–52)
HCT VFR BLDA CALC: 43 % — SIGNIFICANT CHANGE UP (ref 39–51)
HGB BLD CALC-MCNC: 14.2 G/DL — SIGNIFICANT CHANGE UP (ref 12.6–17.4)
HGB BLD-MCNC: 13.7 G/DL — LOW (ref 14–18)
IMM GRANULOCYTES NFR BLD AUTO: 0.4 % — HIGH (ref 0.1–0.3)
LACTATE BLDV-MCNC: 2.5 MMOL/L — HIGH (ref 0.5–2)
LACTATE SERPL-SCNC: 1.9 MMOL/L — SIGNIFICANT CHANGE UP (ref 0.7–2)
LIDOCAIN IGE QN: 19 U/L — SIGNIFICANT CHANGE UP (ref 7–60)
LYMPHOCYTES # BLD AUTO: 1.77 K/UL — SIGNIFICANT CHANGE UP (ref 1.2–3.4)
LYMPHOCYTES # BLD AUTO: 18 % — LOW (ref 20.5–51.1)
MCHC RBC-ENTMCNC: 34.2 PG — HIGH (ref 27–31)
MCHC RBC-ENTMCNC: 34.3 G/DL — SIGNIFICANT CHANGE UP (ref 32–37)
MCV RBC AUTO: 99.8 FL — HIGH (ref 80–94)
MONOCYTES # BLD AUTO: 0.94 K/UL — HIGH (ref 0.1–0.6)
MONOCYTES NFR BLD AUTO: 9.5 % — HIGH (ref 1.7–9.3)
NEUTROPHILS # BLD AUTO: 6.95 K/UL — HIGH (ref 1.4–6.5)
NEUTROPHILS NFR BLD AUTO: 70.6 % — SIGNIFICANT CHANGE UP (ref 42.2–75.2)
NRBC BLD AUTO-RTO: 0 /100 WBCS — SIGNIFICANT CHANGE UP (ref 0–0)
PCO2 BLDV: 65 MMHG — HIGH (ref 42–55)
PH BLDV: 7.3 — LOW (ref 7.32–7.43)
PLATELET # BLD AUTO: 160 K/UL — SIGNIFICANT CHANGE UP (ref 130–400)
PMV BLD: 9.6 FL — SIGNIFICANT CHANGE UP (ref 7.4–10.4)
PO2 BLDV: 30 MMHG — SIGNIFICANT CHANGE UP (ref 25–45)
POTASSIUM BLDV-SCNC: 5.9 MMOL/L — HIGH (ref 3.5–5.1)
POTASSIUM SERPL-MCNC: SIGNIFICANT CHANGE UP MMOL/L (ref 3.5–5)
POTASSIUM SERPL-SCNC: SIGNIFICANT CHANGE UP MMOL/L (ref 3.5–5)
PROT SERPL-MCNC: 5.3 G/DL — LOW (ref 6–8)
RBC # BLD: 4.01 M/UL — LOW (ref 4.7–6.1)
RBC # FLD: 12.3 % — SIGNIFICANT CHANGE UP (ref 11.5–14.5)
SAO2 % BLDV: 41.8 % — LOW (ref 67–88)
SODIUM SERPL-SCNC: 141 MMOL/L — SIGNIFICANT CHANGE UP (ref 135–146)
TROPONIN T, HIGH SENSITIVITY RESULT: 16 NG/L — SIGNIFICANT CHANGE UP (ref 6–21)
WBC # BLD: 9.85 K/UL — SIGNIFICANT CHANGE UP (ref 4.8–10.8)
WBC # FLD AUTO: 9.85 K/UL — SIGNIFICANT CHANGE UP (ref 4.8–10.8)

## 2025-06-30 PROCEDURE — 83735 ASSAY OF MAGNESIUM: CPT

## 2025-06-30 PROCEDURE — 72170 X-RAY EXAM OF PELVIS: CPT | Mod: 26

## 2025-06-30 PROCEDURE — 85730 THROMBOPLASTIN TIME PARTIAL: CPT

## 2025-06-30 PROCEDURE — 74177 CT ABD & PELVIS W/CONTRAST: CPT | Mod: 26

## 2025-06-30 PROCEDURE — 97166 OT EVAL MOD COMPLEX 45 MIN: CPT | Mod: GO

## 2025-06-30 PROCEDURE — 93010 ELECTROCARDIOGRAM REPORT: CPT

## 2025-06-30 PROCEDURE — 70450 CT HEAD/BRAIN W/O DYE: CPT | Mod: 26

## 2025-06-30 PROCEDURE — 92523 SPEECH SOUND LANG COMPREHEN: CPT | Mod: GN

## 2025-06-30 PROCEDURE — 92610 EVALUATE SWALLOWING FUNCTION: CPT | Mod: GN

## 2025-06-30 PROCEDURE — 97162 PT EVAL MOD COMPLEX 30 MIN: CPT | Mod: GP

## 2025-06-30 PROCEDURE — 82962 GLUCOSE BLOOD TEST: CPT

## 2025-06-30 PROCEDURE — 99285 EMERGENCY DEPT VISIT HI MDM: CPT

## 2025-06-30 PROCEDURE — 36415 COLL VENOUS BLD VENIPUNCTURE: CPT

## 2025-06-30 PROCEDURE — 93971 EXTREMITY STUDY: CPT | Mod: RT

## 2025-06-30 PROCEDURE — 85610 PROTHROMBIN TIME: CPT

## 2025-06-30 PROCEDURE — 72125 CT NECK SPINE W/O DYE: CPT | Mod: 26

## 2025-06-30 PROCEDURE — 84100 ASSAY OF PHOSPHORUS: CPT

## 2025-06-30 PROCEDURE — 71260 CT THORAX DX C+: CPT | Mod: 26

## 2025-06-30 PROCEDURE — 85025 COMPLETE CBC W/AUTO DIFF WBC: CPT

## 2025-06-30 PROCEDURE — 99222 1ST HOSP IP/OBS MODERATE 55: CPT

## 2025-06-30 PROCEDURE — 92507 TX SP LANG VOICE COMM INDIV: CPT | Mod: GN

## 2025-06-30 PROCEDURE — 97116 GAIT TRAINING THERAPY: CPT | Mod: GP

## 2025-06-30 PROCEDURE — 71045 X-RAY EXAM CHEST 1 VIEW: CPT | Mod: 26

## 2025-06-30 PROCEDURE — 80053 COMPREHEN METABOLIC PANEL: CPT

## 2025-06-30 RX ADMIN — Medication 1000 MILLILITER(S): at 18:17

## 2025-06-30 NOTE — H&P ADULT - ASSESSMENT
74 y/o M, PMHx of Parkinsons, HTN, DM, gout, seizures on keppra, s/p multiple CVA's,  ICAD (multifocal left M2 and right VA disease) residual expressive aphasia/dysarthria/R facial droop, sent from assisted living for frequent falls    #Frequent falls  #S/p multiple CVA's,  ICAD (multifocal left M2 and right VA disease) residual expressive aphasia/dysarthria/R facial droop  - Recent admission ton inpt rehab ionn 05/2025 and DC'ed to [facility] living facility  - CT C/A/P w/ IV con: new b/l pulm nodules possible post infections w/ 3 month f/u recommednded   - New soft tissue thickening/fat infiltration along the bilateral posterior/buttock soft tissues, possibly reflecting underlying cellulitis  - Per chart fell 8-10 times since Thursday  - c/w DAPT and Statin  - PT/OT  - Speech therapy    #Parkinson's   - c/w entacapone   - c/w carvidopa-levodopa  74 y/o M, PMHx of Parkinsons, HTN, DM, gout, seizures on keppra, s/p multiple CVA's,  ICAD (multifocal left M2 and right VA disease) residual expressive aphasia/dysarthria/R facial droop, sent from assisted living for frequent falls    #Frequent falls  #S/p multiple CVA's,  ICAD (multifocal left M2 and right VA disease) residual expressive aphasia/dysarthria/R facial droop  - Recent admission ton inpt rehab ionn 05/2025 [facility]  - CT C/A/P w/ IV con: new b/l pulm nodules possible post infections w/ 3 month f/u recommednded   - New soft tissue thickening/fat infiltration along the bilateral posterior/buttock soft tissues, possibly reflecting underlying cellulitis  - Per chart fell 8-10 times since Thursday  - c/w DAPT and Statin  - PT/OT  - Speech therapy    #RLE swelling w/ pitting edema  - f/u RLE duplex     #Parkinson's   #Demetia  - c/w entacapone   - c/w carvidopa-levodopa ER and immediate release  - On home rivastigmine transdermal patch   - c/w melatonin 3mg po     #HTN  - no longer on medications    #DM  - a1c 5.6 in 04/2025  - no longer on meds    #Seizures  - c/w keppra    #Deppresion?  - c/w mirtazapine    #Constipation  - c/w Miralax and senna     #DVT PPx- LVNX  #GI PPx- PPI   #Diet- Minced and moist per last admission  #Dispo- pending PT/OT eval, RLE duplex  #Code- FULL

## 2025-06-30 NOTE — ED ADULT TRIAGE NOTE - CHIEF COMPLAINT QUOTE
BIBEMS  from [facility] living facility with multiple falls since being d'c from NH and transferred to assistive facility on Thursday , - loc, - HT on Plavix. pt. minimally verbal

## 2025-06-30 NOTE — ED PROVIDER NOTE - OBJECTIVE STATEMENT
74 y/o M, PMHx of Parkinsons, HTN, DM, gout, seizures on keppra, prior ischemic strokes with residual expressive aphasia/dysarthria/R facial droop, sent from assisted living for frequent falls. Pt is unable to provide any history.

## 2025-06-30 NOTE — ED PROVIDER NOTE - CONSIDERATION OF ADMISSION OBSERVATION
Patient is to be admitted to the hospital and the case was discussed with the admitting physician.  Any changes in plan, additional imaging/labs, and further work up will be at the discretion of the admitting physician. Consideration of Admission/Observation

## 2025-06-30 NOTE — H&P ADULT - NSHPLABSRESULTS_GEN_ALL_CORE
.                          13.7   9.85  )-----------( 160      ( 30 Jun 2025 15:10 )             40.0     WBC:  9.85 [06-30-25 @ 15:10]<-  Hemoglobin: 13.7 [06-30-25 @ 15:10]<-  06-30    141  |  104  |  16  ----------------------------<  90  TNP   |  26  |  0.9    Ca    8.7      30 Jun 2025 15:10    TPro  5.3[L]  /  Alb  3.4[L]  /  TBili  1.0  /  DBili  x   /  AST  29  /  ALT  15  /  AlkPhos  77  06-30      Creatinine: 0.9 [06-30-25 @ 15:10]<-  Sodium: 141 [06-30-25 @ 15:10]<-  Glucose: 93 <-      Cardiac Markers  Troponin: 16 (06-30-25 @ 15:10)<--  Pro-BNP:         VBG - ( 30 Jun 2025 15:21 )  pH: 7.30  /  pCO2: 65    /  pO2: 30    / HCO3: 32    / Base Excess: 2.5   /  SaO2: 41.8  /  Lactate: 2.5            Urinalysis Basic - ( 30 Jun 2025 15:10 )    Color: x / Appearance: x / SG: x / pH: x  Gluc: 90 mg/dL / Ketone: x  / Bili: x / Urobili: x   Blood: x / Protein: x / Nitrite: x   Leuk Esterase: x / RBC: x / WBC x   Sq Epi: x / Non Sq Epi: x / Bacteria: x        RADIOLOGY & ADDITIONAL STUDIES:

## 2025-06-30 NOTE — PATIENT PROFILE ADULT - FALL HARM RISK - HARM RISK INTERVENTIONS
Assistance OOB with selected safe patient handling equipment/Communicate Risk of Fall with Harm to all staff/Discuss with provider need for PT consult/Monitor for mental status changes/Monitor gait and stability/Provide patient with walking aids - walker, cane, crutches/Reinforce activity limits and safety measures with patient and family/Tailored Fall Risk Interventions/Use of alarms - bed, chair and/or voice tab/Visual Cue: Yellow wristband and red socks/Bed in lowest position, wheels locked, appropriate side rails in place/Call bell, personal items and telephone in reach/Instruct patient to call for assistance before getting out of bed or chair/Non-slip footwear when patient is out of bed/[place] to call system/Physically safe environment - no spills, clutter or unnecessary equipment/Purposeful Proactive Rounding/Room/bathroom lighting operational, light cord in reach

## 2025-06-30 NOTE — H&P ADULT - ATTENDING COMMENTS
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 Attempted to call healthcare proxy Shanon Aguila but pt's emergency contact hung up the phoe so hx is limited 2/2 pt's expressive aphasia. Per chart pt has been having recurrent falls admitted in  05/2025 to acute inpt rehab, and was dc'ed to [facility]. Per HIE has been receiving speech therapy and physical therapy w/o improvement.    Vitals:  T(F): 98.4   HR: 64   BP: 160/78  RR: 18   SpO2: 98%     SIG LabsLactate 2.5, PH 3.0, PCO2 65    IMAGING: CT C/A/P w/ IV con  1. No definite evidence of acute traumatic injury within the chest,   abdomen or pelvis.  2. Multiple new scattered bilateral pulmonary nodules, some in a   tree-in-bud configuration, possibly postinflammatory; follow-up CT chest   within 3 months may be of use.  3. New soft tissue thickening/fat infiltration along the bilateral   posterior/buttock soft tissues, possibly reflecting underlying   cellulitis; clinical correlation may be of use.     CT HEAD:  No acute intracranial findings.  Stable moderate chronic microvascular ischemic changes and small   scattered chronic infarcts.    CT CERVICAL SPINE:  No acute cervical fracture or facet subluxation.    ED interventions: 1 L NS bolus   (30 Jun 2025 21:55)    Physical Exam:  General: WN/WD NAD  Neurology: A&Ox1, R facial droop, aphasia, follows commands  Eyes: PERRLA/ EOMI  ENT/Neck: Neck supple, trachea midline, No JVD  Respiratory: CTA B/L, No wheezing, rales, rhonchi  CV: Normal rate regular rhythm, S1S2, no murmurs, rubs or gallops  Abdominal: Soft, NT, ND +BS,   Extremities: No edema, + peripheral pulses  Skin: No Rashes, Hematoma, Ecchymosis    A/p  Recurrent fall / weakness - multifactorial Parkinson's, H/o CVA and dementia    -fall precautions   -PT / rehab   -speech and OT  -check orthostatic vitals and PRN IV fluids     RLE swelling w/ pitting edema   -Venous duplex    Parkinson's   Dementia  Depression  -c/w OP Rx     HTN   -start low dose ARB ( no diuretic given falls )    H/o chronic constipation   - Miralax and senna     DVT prophylaxis     PATIENT SEEN by ATTENDING 6/30/25

## 2025-06-30 NOTE — PATIENT PROFILE ADULT - NSPROPTRIGHTBILLOFRIGHTS_GEN_A_NUR
----- Message from Anderson Jc MD sent at 9/15/2023  6:25 AM EDT -----  CBC is stable. CMP is ok. Cholesterol at 132. Psa level is good. Thyroid levels are ok. Continue present.
LMTCB
Wife informed
patient

## 2025-06-30 NOTE — H&P ADULT - HISTORY OF PRESENT ILLNESS
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 Attempted to call healthcare proxy Shanon Aguila but pt's emergency contact hung up the phoe so hx is limited 2/2 pt's expressive aphasia. Per chart pt has been having recurrent falls admitted in  05/2025 to acute inpt rehab, and was dc'ed to [facility]. Per HIE has been receiving speech therapy and physical therapy w/o improvement.    Vitals:  T(F): 98.4   HR: 64   BP: 160/78  RR: 18   SpO2: 98%     SIG LabsLactate 2.5, PH 3.0, PCO2 65    IMAGING: CT C/A/P w/ IV con  1. No definite evidence of acute traumatic injury within the chest,   abdomen or pelvis.  2. Multiple new scattered bilateral pulmonary nodules, some in a   tree-in-bud configuration, possibly postinflammatory; follow-up CT chest   within 3 months may be of use.  3. New soft tissue thickening/fat infiltration along the bilateral   posterior/buttock soft tissues, possibly reflecting underlying   cellulitis; clinical correlation may be of use.     CT HEAD:  No acute intracranial findings.  Stable moderate chronic microvascular ischemic changes and small   scattered chronic infarcts.    CT CERVICAL SPINE:  No acute cervical fracture or facet subluxation.    ED interventions: 1 L NS bolus

## 2025-06-30 NOTE — ED PROVIDER NOTE - PROGRESS NOTE DETAILS
Spoke with Ayla Claudio, who is patient's health proxy, states patient was recently discharged from nursing home to assisted living and patient keeps falling.  As per her, patient fell about 8-10 times since Thursday.  Frequent falls started during last admission for stroke.

## 2025-06-30 NOTE — PATIENT PROFILE ADULT - HAVE YOU RECENTLY LOST WEIGHT WITHOUT TRYING?
Progress Note: Patient received from ED. He reports polysubstance abuse and suicidal thoughts without specific plan or intent. Patient reports he was prescribed psychotropics in the past but has not been compliant. Patient hopes to receive mental health and substance abuse treatment.     Patient is cooperative with staff. He reports feeling tired but initially okay. He later reported hot flashes and slight tremor. Given PRN Ativan which was effective at this time.     Patient was not able to complete entire admission at this time due to withdrawal symptoms. He requested to complete later. Endorsed to night shift staff.   No (0)

## 2025-06-30 NOTE — H&P ADULT - NSHPPHYSICALEXAM_GEN_ALL_CORE
GENERAL: NAD, lying in bed comfortably  HEAD:  Atraumatic, normocephalic  EYES: EOMI, PERRLA, conjunctiva and sclera clear  NECK: Supple, trachea midline, no JVD  HEART: Regular rate and rhythm, no murmurs, rubs, or gallops  LUNGS: Unlabored respirations.  Clear to auscultation bilaterally, no crackles, wheezing, or rhonchi  ABDOMEN: Soft, nontender, nondistended, +BS  EXTREMITIES: R LE swelling with pitting edema   NERVOUS SYSTEM:  Aphasic, follows commands, no motor deficits on my exam

## 2025-06-30 NOTE — ED ADULT NURSE NOTE - CHIEF COMPLAINT QUOTE
BIBEMS  from [facility] with multiple falls since being d'c from NH and transferred to assistive facility on Thursday , - loc, - HT on Plavix. pt. minimally verbal

## 2025-06-30 NOTE — ED PROVIDER NOTE - PHYSICAL EXAMINATION
pt in NAD, awake, non-verbal, head NC/AT, lungs CTA B/L, CV S1S2 regular, abdomen soft/NT/ND/(+)BS, ext (-) deformity, moving all extremities.

## 2025-06-30 NOTE — ED ADULT NURSE NOTE - NSFALLHARMRISKINTERV_ED_ALL_ED
Assistance OOB with selected safe patient handling equipment if applicable/Assistance with ambulation/Communicate risk of Fall with Harm to all staff, patient, and family/Monitor gait and stability/Provide visual cue: red socks, yellow wristband, yellow gown, etc/Reinforce activity limits and safety measures with patient and family/Bed in lowest position, wheels locked, appropriate side rails in place/Call bell, personal items and telephone in reach/Instruct patient to call for assistance before getting out of bed/chair/stretcher/Non-slip footwear applied when patient is off stretcher/[place] to call system/Physically safe environment - no spills, clutter or unnecessary equipment/Purposeful Proactive Rounding/Room/bathroom lighting operational, light cord in reach

## 2025-06-30 NOTE — ED ADULT NURSE NOTE - OBJECTIVE STATEMENT
as per family, patient was recently discharged from nursing home to assisted living and patient keeps falling.  As per her, patient fell about 8-10 times since Thursday.  Frequent falls started during last admission for stroke.

## 2025-06-30 NOTE — ED PROVIDER NOTE - CLINICAL SUMMARY MEDICAL DECISION MAKING FREE TEXT BOX
76 y/o M, PMHx of Parkinsons, HTN, DM, gout, seizures on keppra, prior ischemic strokes with residual expressive aphasia/dysarthria/R facial droop, sent from assisted living for frequent falls. Pt is unable to provide any history. No acute traumatic findings on the exam or imaging. Will admit for placement.

## 2025-07-01 LAB
ALBUMIN SERPL ELPH-MCNC: 3.2 G/DL — LOW (ref 3.5–5.2)
ALP SERPL-CCNC: 82 U/L — SIGNIFICANT CHANGE UP (ref 30–115)
ALT FLD-CCNC: 46 U/L — HIGH (ref 0–41)
ANION GAP SERPL CALC-SCNC: 12 MMOL/L — SIGNIFICANT CHANGE UP (ref 7–14)
APTT BLD: 27.5 SEC — SIGNIFICANT CHANGE UP (ref 27–39.2)
AST SERPL-CCNC: 22 U/L — SIGNIFICANT CHANGE UP (ref 0–41)
BASOPHILS # BLD AUTO: 0.03 K/UL — SIGNIFICANT CHANGE UP (ref 0–0.2)
BASOPHILS NFR BLD AUTO: 0.4 % — SIGNIFICANT CHANGE UP (ref 0–1)
BILIRUB SERPL-MCNC: 1.4 MG/DL — HIGH (ref 0.2–1.2)
BUN SERPL-MCNC: 11 MG/DL — SIGNIFICANT CHANGE UP (ref 10–20)
CALCIUM SERPL-MCNC: 8.5 MG/DL — SIGNIFICANT CHANGE UP (ref 8.4–10.5)
CHLORIDE SERPL-SCNC: 106 MMOL/L — SIGNIFICANT CHANGE UP (ref 98–110)
CO2 SERPL-SCNC: 21 MMOL/L — SIGNIFICANT CHANGE UP (ref 17–32)
CREAT SERPL-MCNC: 0.8 MG/DL — SIGNIFICANT CHANGE UP (ref 0.7–1.5)
EGFR: 93 ML/MIN/1.73M2 — SIGNIFICANT CHANGE UP
EGFR: 93 ML/MIN/1.73M2 — SIGNIFICANT CHANGE UP
EOSINOPHIL # BLD AUTO: 0.17 K/UL — SIGNIFICANT CHANGE UP (ref 0–0.7)
EOSINOPHIL NFR BLD AUTO: 2 % — SIGNIFICANT CHANGE UP (ref 0–8)
GLUCOSE SERPL-MCNC: 63 MG/DL — LOW (ref 70–99)
HCT VFR BLD CALC: 40.2 % — LOW (ref 42–52)
HGB BLD-MCNC: 13.7 G/DL — LOW (ref 14–18)
IMM GRANULOCYTES NFR BLD AUTO: 0.4 % — HIGH (ref 0.1–0.3)
INR BLD: 1.01 RATIO — SIGNIFICANT CHANGE UP (ref 0.65–1.3)
LYMPHOCYTES # BLD AUTO: 1.77 K/UL — SIGNIFICANT CHANGE UP (ref 1.2–3.4)
LYMPHOCYTES # BLD AUTO: 21 % — SIGNIFICANT CHANGE UP (ref 20.5–51.1)
MAGNESIUM SERPL-MCNC: 1.8 MG/DL — SIGNIFICANT CHANGE UP (ref 1.8–2.4)
MCHC RBC-ENTMCNC: 33.6 PG — HIGH (ref 27–31)
MCHC RBC-ENTMCNC: 34.1 G/DL — SIGNIFICANT CHANGE UP (ref 32–37)
MCV RBC AUTO: 98.5 FL — HIGH (ref 80–94)
MONOCYTES # BLD AUTO: 0.65 K/UL — HIGH (ref 0.1–0.6)
MONOCYTES NFR BLD AUTO: 7.7 % — SIGNIFICANT CHANGE UP (ref 1.7–9.3)
NEUTROPHILS # BLD AUTO: 5.79 K/UL — SIGNIFICANT CHANGE UP (ref 1.4–6.5)
NEUTROPHILS NFR BLD AUTO: 68.5 % — SIGNIFICANT CHANGE UP (ref 42.2–75.2)
NRBC BLD AUTO-RTO: 0 /100 WBCS — SIGNIFICANT CHANGE UP (ref 0–0)
PHOSPHATE SERPL-MCNC: 3.5 MG/DL — SIGNIFICANT CHANGE UP (ref 2.1–4.9)
PLATELET # BLD AUTO: 160 K/UL — SIGNIFICANT CHANGE UP (ref 130–400)
PMV BLD: 9.8 FL — SIGNIFICANT CHANGE UP (ref 7.4–10.4)
POTASSIUM SERPL-MCNC: 4.5 MMOL/L — SIGNIFICANT CHANGE UP (ref 3.5–5)
POTASSIUM SERPL-SCNC: 4.5 MMOL/L — SIGNIFICANT CHANGE UP (ref 3.5–5)
PROT SERPL-MCNC: 5.6 G/DL — LOW (ref 6–8)
PROTHROM AB SERPL-ACNC: 11.9 SEC — SIGNIFICANT CHANGE UP (ref 9.95–12.87)
RBC # BLD: 4.08 M/UL — LOW (ref 4.7–6.1)
RBC # FLD: 12.2 % — SIGNIFICANT CHANGE UP (ref 11.5–14.5)
SODIUM SERPL-SCNC: 139 MMOL/L — SIGNIFICANT CHANGE UP (ref 135–146)
WBC # BLD: 8.44 K/UL — SIGNIFICANT CHANGE UP (ref 4.8–10.8)
WBC # FLD AUTO: 8.44 K/UL — SIGNIFICANT CHANGE UP (ref 4.8–10.8)

## 2025-07-01 PROCEDURE — 93971 EXTREMITY STUDY: CPT | Mod: 26,RT

## 2025-07-01 PROCEDURE — 99233 SBSQ HOSP IP/OBS HIGH 50: CPT

## 2025-07-01 RX ORDER — ASPIRIN 325 MG
81 TABLET ORAL DAILY
Refills: 0 | Status: DISCONTINUED | OUTPATIENT
Start: 2025-07-01 | End: 2025-07-03

## 2025-07-01 RX ORDER — LEVETIRACETAM 10 MG/ML
500 INJECTION, SOLUTION INTRAVENOUS
Refills: 0 | Status: DISCONTINUED | OUTPATIENT
Start: 2025-07-01 | End: 2025-07-03

## 2025-07-01 RX ORDER — CARBIDOPA/LEVODOPA 25MG-100MG
2 TABLET ORAL THREE TIMES A DAY
Refills: 0 | Status: DISCONTINUED | OUTPATIENT
Start: 2025-07-01 | End: 2025-07-01

## 2025-07-01 RX ORDER — CLOPIDOGREL BISULFATE 75 MG/1
75 TABLET, FILM COATED ORAL DAILY
Refills: 0 | Status: DISCONTINUED | OUTPATIENT
Start: 2025-07-01 | End: 2025-07-03

## 2025-07-01 RX ORDER — POLYETHYLENE GLYCOL 3350 17 G/17G
17 POWDER, FOR SOLUTION ORAL
Refills: 0 | Status: DISCONTINUED | OUTPATIENT
Start: 2025-07-01 | End: 2025-07-03

## 2025-07-01 RX ORDER — ATORVASTATIN CALCIUM 80 MG/1
80 TABLET, FILM COATED ORAL AT BEDTIME
Refills: 0 | Status: DISCONTINUED | OUTPATIENT
Start: 2025-07-01 | End: 2025-07-03

## 2025-07-01 RX ORDER — SENNA 187 MG
2 TABLET ORAL AT BEDTIME
Refills: 0 | Status: DISCONTINUED | OUTPATIENT
Start: 2025-07-01 | End: 2025-07-03

## 2025-07-01 RX ORDER — MIRTAZAPINE 30 MG/1
7.5 TABLET, FILM COATED ORAL AT BEDTIME
Refills: 0 | Status: DISCONTINUED | OUTPATIENT
Start: 2025-07-01 | End: 2025-07-03

## 2025-07-01 RX ORDER — CARBIDOPA/LEVODOPA 25MG-100MG
1 TABLET ORAL
Refills: 0 | Status: DISCONTINUED | OUTPATIENT
Start: 2025-07-01 | End: 2025-07-03

## 2025-07-01 RX ORDER — ACETAMINOPHEN 500 MG/5ML
650 LIQUID (ML) ORAL EVERY 6 HOURS
Refills: 0 | Status: DISCONTINUED | OUTPATIENT
Start: 2025-07-01 | End: 2025-07-03

## 2025-07-01 RX ORDER — ENOXAPARIN SODIUM 100 MG/ML
40 INJECTION SUBCUTANEOUS EVERY 24 HOURS
Refills: 0 | Status: DISCONTINUED | OUTPATIENT
Start: 2025-07-01 | End: 2025-07-03

## 2025-07-01 RX ORDER — MELATONIN 5 MG
3 TABLET ORAL AT BEDTIME
Refills: 0 | Status: DISCONTINUED | OUTPATIENT
Start: 2025-07-01 | End: 2025-07-03

## 2025-07-01 RX ORDER — ENTACAPONE 200 MG/1
200 TABLET, FILM COATED ORAL
Refills: 0 | Status: DISCONTINUED | OUTPATIENT
Start: 2025-07-01 | End: 2025-07-03

## 2025-07-01 RX ORDER — CARBIDOPA/LEVODOPA 25MG-100MG
2 TABLET ORAL THREE TIMES A DAY
Refills: 0 | Status: DISCONTINUED | OUTPATIENT
Start: 2025-07-01 | End: 2025-07-03

## 2025-07-01 RX ADMIN — Medication 2 TABLET(S): at 23:08

## 2025-07-01 RX ADMIN — Medication 2 TABLET(S): at 21:20

## 2025-07-01 RX ADMIN — LEVETIRACETAM 500 MILLIGRAM(S): 10 INJECTION, SOLUTION INTRAVENOUS at 17:23

## 2025-07-01 RX ADMIN — Medication 1 APPLICATION(S): at 12:24

## 2025-07-01 RX ADMIN — ENTACAPONE 200 MILLIGRAM(S): 200 TABLET, FILM COATED ORAL at 23:08

## 2025-07-01 RX ADMIN — Medication 3 MILLIGRAM(S): at 21:20

## 2025-07-01 RX ADMIN — Medication 1 TABLET(S): at 21:19

## 2025-07-01 RX ADMIN — Medication 81 MILLIGRAM(S): at 11:47

## 2025-07-01 RX ADMIN — ATORVASTATIN CALCIUM 80 MILLIGRAM(S): 80 TABLET, FILM COATED ORAL at 21:20

## 2025-07-01 RX ADMIN — ENTACAPONE 200 MILLIGRAM(S): 200 TABLET, FILM COATED ORAL at 11:47

## 2025-07-01 RX ADMIN — CLOPIDOGREL BISULFATE 75 MILLIGRAM(S): 75 TABLET, FILM COATED ORAL at 11:47

## 2025-07-01 RX ADMIN — Medication 2 TABLET(S): at 06:20

## 2025-07-01 RX ADMIN — POLYETHYLENE GLYCOL 3350 17 GRAM(S): 17 POWDER, FOR SOLUTION ORAL at 06:20

## 2025-07-01 RX ADMIN — LEVETIRACETAM 500 MILLIGRAM(S): 10 INJECTION, SOLUTION INTRAVENOUS at 06:20

## 2025-07-01 RX ADMIN — ENTACAPONE 200 MILLIGRAM(S): 200 TABLET, FILM COATED ORAL at 06:20

## 2025-07-01 RX ADMIN — MIRTAZAPINE 7.5 MILLIGRAM(S): 30 TABLET, FILM COATED ORAL at 21:19

## 2025-07-01 RX ADMIN — Medication 40 MILLIGRAM(S): at 06:20

## 2025-07-01 RX ADMIN — Medication 2 TABLET(S): at 13:22

## 2025-07-01 RX ADMIN — ENOXAPARIN SODIUM 40 MILLIGRAM(S): 100 INJECTION SUBCUTANEOUS at 06:19

## 2025-07-01 RX ADMIN — ENTACAPONE 200 MILLIGRAM(S): 200 TABLET, FILM COATED ORAL at 17:23

## 2025-07-01 NOTE — PROGRESS NOTE ADULT - TIME BILLING
The time spent on this encounter (above) does NOT include teaching time and/or separately reported/billed services.    1st day w/ pt - hosp day 1    long update HCP    complex care coord    complex chart/image/lab/med review    complex care plan    poor prog

## 2025-07-01 NOTE — CONSULT NOTE ADULT - SUBJECTIVE AND OBJECTIVE BOX
Neurology Consult    74 y/o M, PMHx of Parkinsons, HTN, DM, gout, seizures on keppra, s/p multiple CVA's,  ICAD (multifocal left M2 and right [place] disease) residual expressive aphasia/dysarthria/R facial droop, sent from assisted living for frequent falls. Neuro consulted for frequent falls with concerns for worsening Parkinsonism. Pt was admitted to stroke service here in 04/2025 for worsening aphasia found to have multiple acute punctate infarcts in b/l hemispheres 2/2 ICAD. He was initally NI actual and was taken for DSA but no LVO just severe ICAD. He was then transfered to NICU for BP augmentation and subsequently downgraded to stroke unit. Pt was dc'ed to  acute rehab for 2 weeks early May 2025, then to [facility]. He has been having more falls in past few weeks as per charts. Pt unable to speak, so hx is limited, tried calling contact number on chart but no answer. As per previous chart review, he has hx of falls since atleast 2018, and was started on Entacapone in 2018, and sinemet dosing was subsequently increased. Unclear who is his primary neurologist. Pt mentioned he dont use cane or walker despite several year hx of falls. Denies any weakness. Feels tremors in L hand is same as before.       PAST MEDICAL & SURGICAL HISTORY:  Parkinson disease      CVA (cerebral vascular accident)      HTN (hypertension)      Diabetes mellitus      Gout      History of appendectomy          FAMILY HISTORY:  FH: hypertension    FH: CAD (coronary artery disease)        Social History: (-) x 3    Allergies    sulfa drugs (Unknown)  penicillin (Unknown)  cefaclor (Unknown)    Intolerances        MEDICATIONS  (STANDING):  aspirin enteric coated 81 milliGRAM(s) Oral daily  atorvastatin 80 milliGRAM(s) Oral at bedtime  carbidopa/levodopa  25/100 2 Tablet(s) Oral three times a day  carbidopa/levodopa CR 50/200 1 Tablet(s) Oral <User Schedule>  chlorhexidine 2% Cloths 1 Application(s) Topical daily  clopidogrel Tablet 75 milliGRAM(s) Oral daily  enoxaparin Injectable 40 milliGRAM(s) SubCutaneous every 24 hours  entacapone 200 milliGRAM(s) Oral four times a day  levETIRAcetam 500 milliGRAM(s) Oral two times a day  melatonin 3 milliGRAM(s) Oral at bedtime  mirtazapine 7.5 milliGRAM(s) Oral at bedtime  pantoprazole    Tablet 40 milliGRAM(s) Oral before breakfast  polyethylene glycol 3350 17 Gram(s) Oral two times a day  senna 2 Tablet(s) Oral at bedtime    MEDICATIONS  (PRN):  acetaminophen     Tablet .. 650 milliGRAM(s) Oral every 6 hours PRN Moderate Pain (4 - 6)      Review of systems:    As per HPI    Vital Signs Last 24 Hrs  T(C): 36.6 (01 Jul 2025 12:19), Max: 37.1 (30 Jun 2025 20:46)  T(F): 97.9 (01 Jul 2025 12:19), Max: 98.7 (30 Jun 2025 20:46)  HR: 83 (01 Jul 2025 12:19) (64 - 83)  BP: 128/78 (01 Jul 2025 12:19) (128/73 - 160/78)  BP(mean): 95 (01 Jul 2025 12:19) (95 - 105)  RR: 18 (01 Jul 2025 12:19) (18 - 20)  SpO2: 97% (01 Jul 2025 12:19) (97% - 99%)    Parameters below as of 01 Jul 2025 04:53  Patient On (Oxygen Delivery Method): room air        Examination:  General:  Appearance is consistent with chronologic age.  No abnormal facies.  Gross skin survey within normal limits.    Cognitive/Language:  Alert. Mute, grossly intact comprehension, severe expressive aphasia, able to follow all simple commands, unable to follow complex commands  Eyes: BTT present b/l.  EOMI w/o nystagmus, skew or reported double vision.  PERRL.  No ptosis/weakness of eyelid closure.    Face:  Facial sensation normal V1 - 3, mild R facial droop  Ears/Nose/Throat:  Hearing grossly intact b/l.  Tongue deviation towards right side  Motor examination:   Normal tone, bulk and range of motion.  resting tremors in L hand and L leg, cogwheeling in wrist (L >R), bradykinesia (L >> R)  Formal Muscle Strength Testing: (MRC grade R/L) 5/5 UE; 4+/5 in LLE rest 5/5  Reflexes:   2+ b/l  biceps, triceps, brachioradialis, patella and Achilles.  Plantar response ?upgoing b/l. Neal neg  Sensory examination:   Intact to light touch and pinprick  in all extremities.  Cerebellum:   FTN/HKS intact with normal CHELSEA in all limbs.  No dysmetria or dysdiadokinesia.    Gait: able to stand without support, and walk few steps without support but unable to assess more as pt wanted to have BM        Labs:   CBC Full  -  ( 01 Jul 2025 06:26 )  WBC Count : 8.44 K/uL  RBC Count : 4.08 M/uL  Hemoglobin : 13.7 g/dL  Hematocrit : 40.2 %  Platelet Count - Automated : 160 K/uL  Mean Cell Volume : 98.5 fL  Mean Cell Hemoglobin : 33.6 pg  Mean Cell Hemoglobin Concentration : 34.1 g/dL  Auto Neutrophil # : x  Auto Lymphocyte # : x  Auto Monocyte # : x  Auto Eosinophil # : x  Auto Basophil # : x  Auto Neutrophil % : x  Auto Lymphocyte % : x  Auto Monocyte % : x  Auto Eosinophil % : x  Auto Basophil % : x    07-01    139  |  106  |  11  ----------------------------<  63[L]  4.5   |  21  |  0.8    Ca    8.5      01 Jul 2025 06:26  Phos  3.5     07-01  Mg     1.8     07-01    TPro  5.6[L]  /  Alb  3.2[L]  /  TBili  1.4[H]  /  DBili  x   /  AST  22  /  ALT  46[H]  /  AlkPhos  82  07-01    LIVER FUNCTIONS - ( 01 Jul 2025 06:26 )  Alb: 3.2 g/dL / Pro: 5.6 g/dL / ALK PHOS: 82 U/L / ALT: 46 U/L / AST: 22 U/L / GGT: x           PT/INR - ( 01 Jul 2025 06:26 )   PT: 11.90 sec;   INR: 1.01 ratio         PTT - ( 01 Jul 2025 06:26 )  PTT:27.5 sec  Urinalysis Basic - ( 01 Jul 2025 06:26 )    Color: x / Appearance: x / SG: x / pH: x  Gluc: 63 mg/dL / Ketone: x  / Bili: x / Urobili: x   Blood: x / Protein: x / Nitrite: x   Leuk Esterase: x / RBC: x / WBC x   Sq Epi: x / Non Sq Epi: x / Bacteria: x          Neuroimaging:  NCHCT: CT Head No Cont:   ACC: [id number] EXAM:  CT CERVICAL SPINE   ORDERED BY: LYSSA JOE     ACC: [id number] EXAM:  CT BRAIN   ORDERED BY: LYSSA JOE     PROCEDURE DATE:  06/30/2025          INTERPRETATION:  CLINICAL INDICATION: Trauma.    TECHNIQUE: CT of the head and cervical spine was performed without the   administration of intravenous contrast.    COMPARISON: CT head dated 4/27/2025. C-spine dated 1/31/2024.    FINDINGS:    CT HEAD:    There is stable prominence of the sulci, sylvian fissures, and   ventricles, reflecting mild diffuse parenchymal volume loss.    There are stable scattered patchy low attenuations in bilateral   periventricular and subcortical cerebral white matter consistent with   moderate chronic microvascular ischemic changes.There are stable chronic   infarcts in the left basal ganglia and bilateral thalami.    There are stable atherosclerotic calcifications of the carotid siphons.    No evidence of acute territorial infarct, intracranial hemorrhage or mass   lesion.    No hydrocephalus. There are no extra-axial fluid collections.    The visualized intraorbital contents are normal. The imaged portions of   the paranasal sinuses are clear. The mastoid air cells are clear. The   visualized soft tissues and osseous structures appear normal.      CT CERVICAL SPINE:    Trace anterolisthesis of C2 on C3 and C7 on T1.    There is no evidence of acute fracture, compression deformity or facet   subluxation of the cervical spine.    The atlantoaxial relationships are maintained. The posterior elements are   intact. There is no significant prevertebral soft tissue swelling. The   visualized paraspinal soft tissues are unremarkable.    Slightly progressed severe multilevel endplate degenerative changes with   marginal osteophyte formation, uncovertebral spurring, loss of normal   disc space height as well as facet joint space compartment narrowing.    There is no high-grade spinal canal stenosis. Please note spinal canal   contents are suboptimally evaluated inherentto CT technique.    There is a new left apical nodule measuring 0.9 cm.      IMPRESSION:    CT HEAD:  No acute intracranial findings.    Stable moderate chronic microvascular ischemic changes and small   scattered chronic infarcts.    CT CERVICAL SPINE:  No acute cervical fracture or facet subluxation.    New 0.9 cm left apical nodule since the prior CTA from 4/27/2025. Please   see separately dictated report of the concurrent CT chest.    --- End of Report ---            KAYKAY MIRANDA MD; AttendingRadiologist  This document has been electronically signed. Jun 30 2025  4:31PM (06-30-25 @ 16:03)      07-01-25 @ 12:24       Neurology Consult    74 y/o M, PMHx of Parkinsons, HTN, DM, gout, seizures on keppra, s/p multiple CVA's,  ICAD (multifocal left M2 and right [place] disease) residual expressive aphasia/dysarthria/R facial droop, sent from assisted living for frequent falls. Neuro consulted for frequent falls with concerns for worsening Parkinsonism. Pt was admitted to stroke service here in 04/2025 for worsening aphasia found to have multiple acute punctate infarcts in b/l hemispheres 2/2 ICAD. He was initally NI actual and was taken for DSA but no LVO just severe ICAD. He was then transfered to NICU for BP augmentation and subsequently downgraded to stroke unit. Pt was dc'ed to  acute rehab for 2 weeks early May 2025, then to [facility]. He has been having more falls in past few weeks as per charts. Pt unable to speak, so hx is limited, tried calling contact number on chart but no answer. As per previous chart review, he has hx of falls since atleast 2018, and was started on Entacapone in 2018, and sinemet dosing was subsequently increased. Unclear who is his primary neurologist. Pt mentioned he dont use cane or walker despite several year hx of falls. Denies any weakness. Feels tremors in L hand is same as before.       PAST MEDICAL & SURGICAL HISTORY:  Parkinson disease      CVA (cerebral vascular accident)      HTN (hypertension)      Diabetes mellitus      Gout      History of appendectomy          FAMILY HISTORY:  FH: hypertension    FH: CAD (coronary artery disease)        Social History: (-) x 3    Allergies    sulfa drugs (Unknown)  penicillin (Unknown)  cefaclor (Unknown)    Intolerances        MEDICATIONS  (STANDING):  aspirin enteric coated 81 milliGRAM(s) Oral daily  atorvastatin 80 milliGRAM(s) Oral at bedtime  carbidopa/levodopa  25/100 2 Tablet(s) Oral three times a day  carbidopa/levodopa CR 50/200 1 Tablet(s) Oral <User Schedule>  chlorhexidine 2% Cloths 1 Application(s) Topical daily  clopidogrel Tablet 75 milliGRAM(s) Oral daily  enoxaparin Injectable 40 milliGRAM(s) SubCutaneous every 24 hours  entacapone 200 milliGRAM(s) Oral four times a day  levETIRAcetam 500 milliGRAM(s) Oral two times a day  melatonin 3 milliGRAM(s) Oral at bedtime  mirtazapine 7.5 milliGRAM(s) Oral at bedtime  pantoprazole    Tablet 40 milliGRAM(s) Oral before breakfast  polyethylene glycol 3350 17 Gram(s) Oral two times a day  senna 2 Tablet(s) Oral at bedtime    MEDICATIONS  (PRN):  acetaminophen     Tablet .. 650 milliGRAM(s) Oral every 6 hours PRN Moderate Pain (4 - 6)      Review of systems:    As per HPI    Vital Signs Last 24 Hrs  T(C): 36.6 (01 Jul 2025 12:19), Max: 37.1 (30 Jun 2025 20:46)  T(F): 97.9 (01 Jul 2025 12:19), Max: 98.7 (30 Jun 2025 20:46)  HR: 83 (01 Jul 2025 12:19) (64 - 83)  BP: 128/78 (01 Jul 2025 12:19) (128/73 - 160/78)  BP(mean): 95 (01 Jul 2025 12:19) (95 - 105)  RR: 18 (01 Jul 2025 12:19) (18 - 20)  SpO2: 97% (01 Jul 2025 12:19) (97% - 99%)    Parameters below as of 01 Jul 2025 04:53  Patient On (Oxygen Delivery Method): room air        Examination:  General:  Appearance is consistent with chronologic age.  No abnormal facies.  Gross skin survey within normal limits.    Cognitive/Language:  Alert. Mute, grossly intact comprehension, severe expressive aphasia, able to follow all simple commands, unable to follow complex commands  Eyes: BTT present b/l.  EOMI w/o nystagmus, skew or reported double vision.  PERRL.  No ptosis/weakness of eyelid closure.    Face:  Facial sensation normal V1 - 3, mild R facial droop  Ears/Nose/Throat:  Hearing grossly intact b/l.  Tongue deviation towards right side  Motor examination:   Normal tone, bulk and range of motion.  resting tremors in L hand and L leg, cogwheeling in wrist (L >R), bradykinesia (L >> R)  Formal Muscle Strength Testing: (MRC grade R/L) 5/5 UE; 4+/5 in LLE rest 5/5  Reflexes:   2+ b/l  biceps, triceps, brachioradialis, patella and Achilles.  Plantar response ?upgoing b/l. Neal neg  Sensory examination:   Intact to light touch and pinprick  in all extremities.  Cerebellum:   FTN/HKS intact with normal CHELSEA in all limbs.  No dysmetria or dysdiadokinesia.    Gait: able to stand without support, and walk few steps without support but unable to assess more as pt wanted to have BM,   Tried assessing gait again at 5PM, pt stood up with support, unable to walk unassisted and again had episode of incontinence as soon as he stood        Labs:   CBC Full  -  ( 01 Jul 2025 06:26 )  WBC Count : 8.44 K/uL  RBC Count : 4.08 M/uL  Hemoglobin : 13.7 g/dL  Hematocrit : 40.2 %  Platelet Count - Automated : 160 K/uL  Mean Cell Volume : 98.5 fL  Mean Cell Hemoglobin : 33.6 pg  Mean Cell Hemoglobin Concentration : 34.1 g/dL  Auto Neutrophil # : x  Auto Lymphocyte # : x  Auto Monocyte # : x  Auto Eosinophil # : x  Auto Basophil # : x  Auto Neutrophil % : x  Auto Lymphocyte % : x  Auto Monocyte % : x  Auto Eosinophil % : x  Auto Basophil % : x    07-01    139  |  106  |  11  ----------------------------<  63[L]  4.5   |  21  |  0.8    Ca    8.5      01 Jul 2025 06:26  Phos  3.5     07-01  Mg     1.8     07-01    TPro  5.6[L]  /  Alb  3.2[L]  /  TBili  1.4[H]  /  DBili  x   /  AST  22  /  ALT  46[H]  /  AlkPhos  82  07-01    LIVER FUNCTIONS - ( 01 Jul 2025 06:26 )  Alb: 3.2 g/dL / Pro: 5.6 g/dL / ALK PHOS: 82 U/L / ALT: 46 U/L / AST: 22 U/L / GGT: x           PT/INR - ( 01 Jul 2025 06:26 )   PT: 11.90 sec;   INR: 1.01 ratio         PTT - ( 01 Jul 2025 06:26 )  PTT:27.5 sec  Urinalysis Basic - ( 01 Jul 2025 06:26 )    Color: x / Appearance: x / SG: x / pH: x  Gluc: 63 mg/dL / Ketone: x  / Bili: x / Urobili: x   Blood: x / Protein: x / Nitrite: x   Leuk Esterase: x / RBC: x / WBC x   Sq Epi: x / Non Sq Epi: x / Bacteria: x          Neuroimaging:  NCHCT: CT Head No Cont:   ACC: [id number] EXAM:  CT CERVICAL SPINE   ORDERED BY: LYSSA JOE     ACC: [id number] EXAM:  CT BRAIN   ORDERED BY: LYSSA JOE     PROCEDURE DATE:  06/30/2025          INTERPRETATION:  CLINICAL INDICATION: Trauma.    TECHNIQUE: CT of the head and cervical spine was performed without the   administration of intravenous contrast.    COMPARISON: CT head dated 4/27/2025. C-spine dated 1/31/2024.    FINDINGS:    CT HEAD:    There is stable prominence of the sulci, sylvian fissures, and   ventricles, reflecting mild diffuse parenchymal volume loss.    There are stable scattered patchy low attenuations in bilateral   periventricular and subcortical cerebral white matter consistent with   moderate chronic microvascular ischemic changes.There are stable chronic   infarcts in the left basal ganglia and bilateral thalami.    There are stable atherosclerotic calcifications of the carotid siphons.    No evidence of acute territorial infarct, intracranial hemorrhage or mass   lesion.    No hydrocephalus. There are no extra-axial fluid collections.    The visualized intraorbital contents are normal. The imaged portions of   the paranasal sinuses are clear. The mastoid air cells are clear. The   visualized soft tissues and osseous structures appear normal.      CT CERVICAL SPINE:    Trace anterolisthesis of C2 on C3 and C7 on T1.    There is no evidence of acute fracture, compression deformity or facet   subluxation of the cervical spine.    The atlantoaxial relationships are maintained. The posterior elements are   intact. There is no significant prevertebral soft tissue swelling. The   visualized paraspinal soft tissues are unremarkable.    Slightly progressed severe multilevel endplate degenerative changes with   marginal osteophyte formation, uncovertebral spurring, loss of normal   disc space height as well as facet joint space compartment narrowing.    There is no high-grade spinal canal stenosis. Please note spinal canal   contents are suboptimally evaluated inherentto CT technique.    There is a new left apical nodule measuring 0.9 cm.      IMPRESSION:    CT HEAD:  No acute intracranial findings.    Stable moderate chronic microvascular ischemic changes and small   scattered chronic infarcts.    CT CERVICAL SPINE:  No acute cervical fracture or facet subluxation.    New 0.9 cm left apical nodule since the prior CTA from 4/27/2025. Please   see separately dictated report of the concurrent CT chest.    --- End of Report ---            KAYKAY MIRANDA MD; AttendingRadiologist  This document has been electronically signed. Jun 30 2025  4:31PM (06-30-25 @ 16:03)      07-01-25 @ 12:24

## 2025-07-01 NOTE — PHYSICAL THERAPY INITIAL EVALUATION ADULT - GENERAL OBSERVATIONS, REHAB EVAL
PT IE [id number]. Chart reviewed. pt encountered semi-reclined in bed. Pt alert, mostly non verbal. However able to follow simple verbal commands.

## 2025-07-01 NOTE — SPEECH LANGUAGE PATHOLOGY EVALUATION - YES/NO QUESTIONS: PERSONAL
Pt was able to accurately respond personal yes/no questions independently such as, "Is your name Nehemias?", "Are you a man?"/yes

## 2025-07-01 NOTE — CONSULT NOTE ADULT - SUBJECTIVE AND OBJECTIVE BOX
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 Attempted to call healthcare proxy Shanon Aguila but pt's emergency contact hung up the phoe so hx is limited 2/2 pt's expressive aphasia. Per chart pt has been having recurrent falls admitted in  05/2025 to acute inpt rehab, and was dc'ed to [facility]. Per [facility] has been receiving speech therapy and physical therapy w/o improvement.    Vitals:  T(F): 98.4   HR: 64   BP: 160/78  RR: 18   SpO2: 98%     SIG LabsLactate 2.5, PH 3.0, PCO2 65    IMAGING: CT C/A/P w/ IV con  1. No definite evidence of acute traumatic injury within the chest,   abdomen or pelvis.  2. Multiple new scattered bilateral pulmonary nodules, some in a   tree-in-bud configuration, possibly postinflammatory; follow-up CT chest   within 3 months may be of use.  3. New soft tissue thickening/fat infiltration along the bilateral   posterior/buttock soft tissues, possibly reflecting underlying   cellulitis; clinical correlation may be of use.     CT HEAD:  No acute intracranial findings.  Stable moderate chronic microvascular ischemic changes and small   scattered chronic infarcts.    CT CERVICAL SPINE:  No acute cervical fracture or facet subluxation.    ED interventions: 1 L NS bolus   (30 Jun 2025 21:55)      PAST MEDICAL & SURGICAL HISTORY:  Parkinson disease      CVA (cerebral vascular accident)      HTN (hypertension)      Diabetes mellitus      Gout      History of appendectomy          Hospital Course:    TODAY'S SUBJECTIVE & REVIEW OF SYMPTOMS:     Constitutional WNL   Cardio WNL   Resp WNL   GI WNL  Heme WNL  Endo WNL  Skin WNL  MSK WNL  Neuro WNL  Cognitive WNL  Psych WNL      MEDICATIONS  (STANDING):  aspirin enteric coated 81 milliGRAM(s) Oral daily  atorvastatin 80 milliGRAM(s) Oral at bedtime  carbidopa/levodopa  25/250 2 Tablet(s) Oral three times a day  carbidopa/levodopa CR 50/200 1 Tablet(s) Oral <User Schedule>  chlorhexidine 2% Cloths 1 Application(s) Topical daily  clopidogrel Tablet 75 milliGRAM(s) Oral daily  enoxaparin Injectable 40 milliGRAM(s) SubCutaneous every 24 hours  entacapone 200 milliGRAM(s) Oral four times a day  levETIRAcetam 500 milliGRAM(s) Oral two times a day  melatonin 3 milliGRAM(s) Oral at bedtime  mirtazapine 7.5 milliGRAM(s) Oral at bedtime  pantoprazole    Tablet 40 milliGRAM(s) Oral before breakfast  polyethylene glycol 3350 17 Gram(s) Oral two times a day  senna 2 Tablet(s) Oral at bedtime    MEDICATIONS  (PRN):  acetaminophen     Tablet .. 650 milliGRAM(s) Oral every 6 hours PRN Moderate Pain (4 - 6)      FAMILY HISTORY:  FH: hypertension    FH: CAD (coronary artery disease)        Allergies    sulfa drugs (Unknown)  penicillin (Unknown)  cefaclor (Unknown)    Intolerances        SOCIAL HISTORY:    [  ] Etoh  [  ] Smoking  [  ] Substance abuse     Home Environment:  [  ] Home Alone  [  ] Lives with Family  [  ] Home Health Aid    Dwelling:  [  ] Apartment  [  ] Private House  [ x ] assisted living  [  ] Skilled Nursing Facility      [  ] Short Term  [  ] Long Term  [  ] [facility]       Elevator [  ]    FUNCTIONAL STATUS PTA: (Check all that apply)  Ambulation: [ x  ]Sup    [  ] Dependent     [  ] Non-Ambulatory  Assistive Device: [  ] SA Cane  [  ]  Q Cane  [ x ] Walker  [  ]  Wheelchair  ADL : [  ] Independent  [  ]  Dependent       Vital Signs Last 24 Hrs  T(C): 36.6 (01 Jul 2025 12:19), Max: 37.1 (30 Jun 2025 20:46)  T(F): 97.9 (01 Jul 2025 12:19), Max: 98.7 (30 Jun 2025 20:46)  HR: 83 (01 Jul 2025 12:19) (64 - 83)  BP: 128/78 (01 Jul 2025 12:19) (128/78 - 160/78)  BP(mean): 95 (01 Jul 2025 12:19) (95 - 105)  RR: 18 (01 Jul 2025 12:19) (18 - 18)  SpO2: 97% (01 Jul 2025 12:19) (97% - 99%)    Parameters below as of 01 Jul 2025 04:53  Patient On (Oxygen Delivery Method): room air          PHYSICAL EXAM: Alert & Oriented X3  GENERAL: NAD, well-groomed, well-developed  HEAD:  Atraumatic, Normocephalic  EYES: EOMI, PERRLA, conjunctiva and sclera clear  NECK: Supple, No JVD, Normal thyroid  CHEST/LUNG: Clear bilaterally; No rales, rhonchi, wheezing, or rubs  HEART: Regular rate and rhythm; No murmurs, rubs, or gallops  ABDOMEN: Soft, Nontender, Nondistended; Bowel sounds present  EXTREMITIES:  2+ Peripheral Pulses, No clubbing, cyanosis, or edema    NERVOUS SYSTEM:  Cranial Nerves 2-12 intact [x  ] Abnormal  [  ]  ROM: WFL all extremities [  ]  Abnormal [  ]  Motor Strength: WFL all extremities  [  ]  Abnormal [ x ] 4+/5 all 4 extrem  finger nose worse left upper extremity than right  Sensation: intact to light touch [  ] Abnormal [  ]  Reflexes: Symmetric [  ]  Abnormal [  ]    FUNCTIONAL STATUS:  Bed Mobility: Independent [  ]  Supervision [  ]  Needs Assistance [  ]  N/A [  ]  Transfers: Independent [  ]  Supervision [  ]  Needs Assistance [  ]  N/A [  ]   Ambulation: Independent [  ]  Supervision [  ]  Needs Assistance [  ]  N/A [  ]  ADL: Independent [  ] Requires Assistance [  ] N/A [  ]      LABS:                        13.7   8.44  )-----------( 160      ( 01 Jul 2025 06:26 )             40.2     07-01    139  |  106  |  11  ----------------------------<  63[L]  4.5   |  21  |  0.8    Ca    8.5      01 Jul 2025 06:26  Phos  3.5     07-01  Mg     1.8     07-01    TPro  5.6[L]  /  Alb  3.2[L]  /  TBili  1.4[H]  /  DBili  x   /  AST  22  /  ALT  46[H]  /  AlkPhos  82  07-01    PT/INR - ( 01 Jul 2025 06:26 )   PT: 11.90 sec;   INR: 1.01 ratio         PTT - ( 01 Jul 2025 06:26 )  PTT:27.5 sec  Urinalysis Basic - ( 01 Jul 2025 06:26 )    Color: x / Appearance: x / SG: x / pH: x  Gluc: 63 mg/dL / Ketone: x  / Bili: x / Urobili: x   Blood: x / Protein: x / Nitrite: x   Leuk Esterase: x / RBC: x / WBC x   Sq Epi: x / Non Sq Epi: x / Bacteria: x        RADIOLOGY & ADDITIONAL STUDIES:    Assesment:

## 2025-07-01 NOTE — SPEECH LANGUAGE PATHOLOGY EVALUATION - SLP SUCCESSFUL AUDITORY STRATEGIES
Pt demonstrated improved function when provided with visual models, repetition, and written cues/repetition/visual cues/verbal cues

## 2025-07-01 NOTE — SWALLOW BEDSIDE ASSESSMENT ADULT - SWALLOW EVAL: DIAGNOSIS
Mild oral dysphagia. Toleration observed for minced and moist with thin liquids via small sips/bites without any overt s/s of penetration/aspiration.

## 2025-07-01 NOTE — SPEECH LANGUAGE PATHOLOGY EVALUATION - SLP REPETITION
Upon confrontation, Pt was not stimulable for repetition cues. However, Pt was able to repeat 4/4 single words and 2/2 3-word utterances following verbal cues./impaired

## 2025-07-01 NOTE — CONSULT NOTE ADULT - ASSESSMENT
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 Pt was dc'ed in May on Sinemet 50/500mg TID, however currenlty on 50/200 TID, unclear why. He is unstable and unable to walk unassisted during exam today. Also, having urinary incontinence as soon as he stands. For now will go back to his dose in May and follow up any change in gait. Suspect parkinsons worsening vs. deconditioning.     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 Pt was dc'ed in May on Sinemet 50/500mg TID, however currenlty on 50/200 TID, unclear why. He is unstable and unable to walk unassisted during exam today. Also, having urinary incontinence as soon as he stands, unclear if its due to autonomic instability. For now will go back to his dose in May and follow up any change in gait. Suspect parkinsons worsening 2/2 under-treatment vs. deconditioning.     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 Pt was dc'ed in May on Sinemet 50/500mg TID, however currenlty on 50/200 TID, unclear why. He is unstable and unable to walk unassisted during exam today. Also, having urinary incontinence as soon as he stands, unclear if its due to autonomic instability. For now will go back to his dose in May and follow up any change in gait. Suspect parkinsons worsening 2/2 underdosing vs. deconditioning.     Recommendations:  - increase sinemet dose to prior dose listed in May 2025 -  50/500 TID   - c/w sinemet 50/200 ER qHS  - c/w entacapone 200 QID  - check orthostatics  - obtain collaterals  - will reassess again tomorrow

## 2025-07-01 NOTE — SPEECH LANGUAGE PATHOLOGY EVALUATION - SLP AUTOMATIC SPEECH
Pt was able to count to 10 with 90% accuracy (e.g., 1-9). Pt was able to state days of the week when given phonemic and written cues/impaired/counting/days of the week

## 2025-07-01 NOTE — SPEECH LANGUAGE PATHOLOGY EVALUATION - SLP PERTINENT HISTORY OF CURRENT PROBLEM
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 Attempted to call healthcare proxy Shanon Aguila but pt's emergency contact hung up the phoe so hx is limited 2/2 pt's expressive aphasia. Per chart pt has been having recurrent falls admitted in  05/2025 to acute inpt rehab, and was dc'ed to [facility]. Per [facility] has been receiving speech therapy and physical therapy w/o improvement. CT Head 6/30/25 revealed no acute intracranial findings, stable moderate chronic microvascular ischemic changes and small scattered chronic infarcts. CT Chest 6/30 revealed no definite evidence of acute traumatic injury within the chest, abdomen or pelvis, multiple new scattered bilateral pulmonary nodules, some in a tree-in-bud configuration, possibly postinflammatory; follow-up CT chest within 3 months may be of use.

## 2025-07-01 NOTE — CONSULT NOTE ADULT - ASSESSMENT
IMPRESSION: Rehab of Parkinson's disease, history of multiple CVAs, ataxia, dysarthria.  History of falls. He is an accomplished [employer].    He requires acute rehab at [facility]-- Parkinson's program with 3 hours of daily therapies at least 5 out of 7 days and close physiatry follow up.   Neurology consult much appreciated. Dr. Brooks note appreciated.     PRECAUTIONS: [  ] Cardiac  [  ] Respiratory  [  ] Seizures [  ] Contact Isolation  [  ] Droplet Isolation  [  ] Other    Weight Bearing Status:     RECOMMENDATION:    Out of Bed to Chair     DVT/Decubiti Prophylaxis    REHAB PLAN:     [ x  ] Bedside P/T 3-5 times a week   [ x  ]   Bedside O/T  2-3 times a week             [   ] No Rehab Therapy Indicated                   [  x ]  Speech Therapy   Conditioning/ROM                                    ADL  Bed Mobility                                               Conditioning/ROM  Transfers                                                     Bed Mobility  Sitting /Standing Balance                         Transfers                                        Gait Training                                               Sitting/Standing Balance  Stair Training [   ]Applicable                    Home equipment Eval                                                                        Splinting  [   ] Only      GOALS:   ADL   [   ]   Independent                    Transfers  [ x  ] Independent                          Ambulation  [ x  ] Independent     [ x   ] With device                            [ x  ]  CG                                                         [   ]  CG                                                                  [   ] CG                            [    ] Min A                                                   [   ] Min A                                                              [   ] Min  A          DISCHARGE PLAN:   [x   ]  Good candidate for Intensive Rehabilitation/Hospital based-[facility] Parkinson's Program                                             Will tolerate 3hrs Intensive Rehab Daily                                       [    ]  Short Term Rehab in Skilled Nursing Facility                                       [    ]  Home with Outpatient or VN services                                         [    ]  Possible Candidate for Intensive Hospital based Rehab

## 2025-07-01 NOTE — CONSULT NOTE ADULT - ATTENDING COMMENTS
PT reports improvement in ambulation and movements after increasing Sinemet dose to 50/500.  Recommend continue current dose for now and f/u with movement specialist/gen neuro as outpt in 2 - 4 wks for further medication management.

## 2025-07-01 NOTE — PHYSICAL THERAPY INITIAL EVALUATION ADULT - PERTINENT HX OF CURRENT PROBLEM, REHAB EVAL
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 Attempted to call healthcare proxy Shanon Aguila but pt's emergency contact hung up the phoe so hx is limited 2/2 pt's expressive aphasia.

## 2025-07-01 NOTE — PROGRESS NOTE ADULT - SUBJECTIVE AND OBJECTIVE BOX
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 Today is hospital day 1d. This morning patient was seen and examined at bedside, resting comfortably in bed. Patient had difficulty responding to questions, only was able to answer "no" to pain, BM, or problems urinating. Patient unsure about details of the fall, and unsure how many he had in the last week. Patient is a poor historian and team will contact health care proxy for more information.  PAST MEDICAL & SURGICAL HISTORY  Parkinson disease    CVA (cerebral vascular accident)    HTN (hypertension)    Diabetes mellitus    Gout    History of appendectomy      SOCIAL HISTORY:  Social History:      ALLERGIES:  sulfa drugs (Unknown)  penicillin (Unknown)  cefaclor (Unknown)    MEDICATIONS:  STANDING MEDICATIONS  aspirin enteric coated 81 milliGRAM(s) Oral daily  atorvastatin 80 milliGRAM(s) Oral at bedtime  carbidopa/levodopa  25/250 2 Tablet(s) Oral three times a day  carbidopa/levodopa CR 50/200 1 Tablet(s) Oral <User Schedule>  chlorhexidine 2% Cloths 1 Application(s) Topical daily  clopidogrel Tablet 75 milliGRAM(s) Oral daily  enoxaparin Injectable 40 milliGRAM(s) SubCutaneous every 24 hours  entacapone 200 milliGRAM(s) Oral four times a day  levETIRAcetam 500 milliGRAM(s) Oral two times a day  melatonin 3 milliGRAM(s) Oral at bedtime  mirtazapine 7.5 milliGRAM(s) Oral at bedtime  pantoprazole    Tablet 40 milliGRAM(s) Oral before breakfast  polyethylene glycol 3350 17 Gram(s) Oral two times a day  senna 2 Tablet(s) Oral at bedtime    PRN MEDICATIONS  acetaminophen     Tablet .. 650 milliGRAM(s) Oral every 6 hours PRN    VITALS:   T(F): 98.7  HR: 76  BP: 147/84  RR: 18  SpO2: 97%    PHYSICAL EXAM:  GENERAL:   (X  ) NAD, lying in bed comfortably     (  ) obtunded     (  ) lethargic     (  ) somnolent    HEAD:   ( X ) Atraumatic     (  ) hematoma     (  ) laceration (specify location:       )     NECK:  ( X ) Supple     (  ) neck stiffness     (  ) nuchal rigidity     (  )  no JVD     (  ) JVD present ( -- cm)    HEART:  Rate -->     ( X ) normal rate     (  ) bradycardic     (  ) tachycardic  Rhythm -->     (  X) regular     (  ) regularly irregular     (  ) irregularly irregular  Murmurs -->     (X  ) normal s1s2     (  ) systolic murmur     (  ) diastolic murmur     (  ) continuous murmur      (  ) S3 present     (  ) S4 present    LUNGS:   (X)Unlabored respirations     (  ) tachypnea  ( X ) B/L air entry     (  ) decreased breath sounds in:  (location     )    (  ) no adventitious sound     (  ) crackles     (  ) wheezing      (  ) rhonchi      (specify location:       )  (  ) chest wall tenderness (specify location:       )    ABDOMEN:   (X  ) Soft     (  ) tense   |   (X  ) nondistended     (  ) distended   |   (  ) +BS     (  ) hypoactive bowel sounds     (  ) hyperactive bowel sounds  (  ) nontender     (  ) RUQ tenderness     (  ) RLQ tenderness     (  ) LLQ tenderness     (  ) epigastric tenderness     (  ) diffuse tenderness  (  ) Splenomegaly      (  ) Hepatomegaly      (  ) Jaundice     (  ) ecchymosis     EXTREMITIES:  ( X ) Normal     (  ) Rash     (  ) ecchymosis     (  ) varicose veins      (  ) pitting edema     (  ) non-pitting edema   (  ) ulceration     (  ) gangrene:     (location:     )    NERVOUS SYSTEM:    ( X ) A&Ox0     (X  ) confused     (  ) lethargic  CN II-XII:     (  ) Intact     ( X ) deficits found     (Specify: R Facial Droop)   Upper extremities:     (  ) no sensorimotor deficits     ( X ) weakness     (  ) loss of proprioception/vibration     (  ) loss of touch/temperature (specify:    )  Lower extremities:     (  ) no sensorimotor deficits     ( X ) weakness     (  ) loss of proprioception/vibration     (  ) loss of touch/temperature (specify:    )    SKIN:   ( x ) No rashes or lesions     (  ) maculopapular rash     (  ) pustules     (  ) vesicles     (  ) ulcer     (  ) ecchymosis     (specify location:     )      LABS:                        13.7   8.44  )-----------( 160      ( 01 Jul 2025 06:26 )             40.2     07-01    139  |  106  |  11  ----------------------------<  63[L]  4.5   |  21  |  0.8    Ca    8.5      01 Jul 2025 06:26  Phos  3.5     07-01  Mg     1.8     07-01    TPro  5.6[L]  /  Alb  3.2[L]  /  TBili  1.4[H]  /  DBili  x   /  AST  22  /  ALT  46[H]  /  AlkPhos  82  07-01    PT/INR - ( 01 Jul 2025 06:26 )   PT: 11.90 sec;   INR: 1.01 ratio         PTT - ( 01 Jul 2025 06:26 )  PTT:27.5 sec  Urinalysis Basic - ( 01 Jul 2025 06:26 )    Color: x / Appearance: x / SG: x / pH: x  Gluc: 63 mg/dL / Ketone: x  / Bili: x / Urobili: x   Blood: x / Protein: x / Nitrite: x   Leuk Esterase: x / RBC: x / WBC x   Sq Epi: x / Non Sq Epi: x / Bacteria: x     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) residual expressive aphasia/dysarthria/R facial droop, sent from assisted living for frequent falls. Today is hospital day 1d. This morning patient was seen and examined at bedside, resting comfortably in bed. Patient had difficulty responding to questions, only was able to answer "no" to pain, BM, or problems urinating. Patient unsure about details of the fall, and unsure how many he had in the last week. Patient is a poor historian and team will contact health care proxy for more information..    PAST MEDICAL & SURGICAL HISTORY  Parkinson disease    CVA (cerebral vascular accident)    HTN (hypertension)    Diabetes mellitus    Gout    History of appendectomy      SOCIAL HISTORY:  Social History:      ALLERGIES:  sulfa drugs (Unknown)  penicillin (Unknown)  cefaclor (Unknown)    MEDICATIONS:  STANDING MEDICATIONS  aspirin enteric coated 81 milliGRAM(s) Oral daily  atorvastatin 80 milliGRAM(s) Oral at bedtime  carbidopa/levodopa  25/250 2 Tablet(s) Oral three times a day  carbidopa/levodopa CR 50/200 1 Tablet(s) Oral <User Schedule>  chlorhexidine 2% Cloths 1 Application(s) Topical daily  clopidogrel Tablet 75 milliGRAM(s) Oral daily  enoxaparin Injectable 40 milliGRAM(s) SubCutaneous every 24 hours  entacapone 200 milliGRAM(s) Oral four times a day  levETIRAcetam 500 milliGRAM(s) Oral two times a day  melatonin 3 milliGRAM(s) Oral at bedtime  mirtazapine 7.5 milliGRAM(s) Oral at bedtime  pantoprazole    Tablet 40 milliGRAM(s) Oral before breakfast  polyethylene glycol 3350 17 Gram(s) Oral two times a day  senna 2 Tablet(s) Oral at bedtime    PRN MEDICATIONS  acetaminophen     Tablet .. 650 milliGRAM(s) Oral every 6 hours PRN    VITALS:   T(F): 98.7  HR: 76  BP: 147/84  RR: 18  SpO2: 97%    PHYSICAL EXAM:  GENERAL:   (X  ) NAD, lying in bed comfortably     (  ) obtunded     (  ) lethargic     (  ) somnolent    HEAD:   ( X ) Atraumatic     (  ) hematoma     (  ) laceration (specify location:       )     NECK:  ( X ) Supple     (  ) neck stiffness     (  ) nuchal rigidity     (  )  no JVD     (  ) JVD present ( -- cm)    HEART:  Rate -->     ( X ) normal rate     (  ) bradycardic     (  ) tachycardic  Rhythm -->     (  X) regular     (  ) regularly irregular     (  ) irregularly irregular  Murmurs -->     (X  ) normal s1s2     (  ) systolic murmur     (  ) diastolic murmur     (  ) continuous murmur      (  ) S3 present     (  ) S4 present    LUNGS:   (X)Unlabored respirations     (  ) tachypnea  ( X ) B/L air entry     (  ) decreased breath sounds in:  (location     )    (  ) no adventitious sound     (  ) crackles     (  ) wheezing      (  ) rhonchi      (specify location:       )  (  ) chest wall tenderness (specify location:       )    ABDOMEN:   (X  ) Soft     (  ) tense   |   (X  ) nondistended     (  ) distended   |   (  ) +BS     (  ) hypoactive bowel sounds     (  ) hyperactive bowel sounds  (  ) nontender     (  ) RUQ tenderness     (  ) RLQ tenderness     (  ) LLQ tenderness     (  ) epigastric tenderness     (  ) diffuse tenderness  (  ) Splenomegaly      (  ) Hepatomegaly      (  ) Jaundice     (  ) ecchymosis     EXTREMITIES:  ( X ) Normal     (  ) Rash     (  ) ecchymosis     (  ) varicose veins      (  ) pitting edema     (  ) non-pitting edema   (  ) ulceration     (  ) gangrene:     (location:     )    NERVOUS SYSTEM:    ( X ) A&Ox0     (X  ) confused     (  ) lethargic  CN II-XII:     (  ) Intact     ( X ) deficits found     (Specify: R Facial Droop)   Upper extremities:     (  ) no sensorimotor deficits     ( X ) weakness     (  ) loss of proprioception/vibration     (  ) loss of touch/temperature (specify:    )  Lower extremities:     (  ) no sensorimotor deficits     ( X ) weakness     (  ) loss of proprioception/vibration     (  ) loss of touch/temperature (specify:    )    SKIN:   ( x ) No rashes or lesions     (  ) maculopapular rash     (  ) pustules     (  ) vesicles     (  ) ulcer     (  ) ecchymosis     (specify location:     )      LABS:                        13.7   8.44  )-----------( 160      ( 01 Jul 2025 06:26 )             40.2     07-01    139  |  106  |  11  ----------------------------<  63[L]  4.5   |  21  |  0.8    Ca    8.5      01 Jul 2025 06:26  Phos  3.5     07-01  Mg     1.8     07-01    TPro  5.6[L]  /  Alb  3.2[L]  /  TBili  1.4[H]  /  DBili  x   /  AST  22  /  ALT  46[H]  /  AlkPhos  82  07-01    PT/INR - ( 01 Jul 2025 06:26 )   PT: 11.90 sec;   INR: 1.01 ratio         PTT - ( 01 Jul 2025 06:26 )  PTT:27.5 sec  Urinalysis Basic - ( 01 Jul 2025 06:26 )    Color: x / Appearance: x / SG: x / pH: x  Gluc: 63 mg/dL / Ketone: x  / Bili: x / Urobili: x   Blood: x / Protein: x / Nitrite: x   Leuk Esterase: x / RBC: x / WBC x   Sq Epi: x / Non Sq Epi: x / Bacteria: x

## 2025-07-01 NOTE — PROGRESS NOTE ADULT - SUBJECTIVE AND OBJECTIVE BOX
GENE ZAKIA  75y  Male  ***My note supersedes ALL resident notes that I sign.  My corrections for their notes are in my note.***    I can be reached directly via Teams or my office number is [phone] or [phone].    INTERVAL EVENTS: Here for f/u of multiple falls at [facility]. Pt sent in because of falls. He has baseline PD and had 2 CVAs this yr. Now fairly aphasic - spontaneous speech is poor, but does better if reading something - like a greeting card. Pt has lost wt and needs asst w/ feeding. He can stand, but balance and str to walk is poor, thus he falls. HCP also feels he is getting a little demented or least has a decr in his usual cognitive abilities. HCP is interested in getting him to acute rehab care, like [facility]. The pt will nod his head that he can talk, but he really can't. He can answer yes and no. He seems comfortable. He followed all commands well. No pain.    T(F): 97.9 (07-01-25 @ 12:19), Max: 98.7 (06-30-25 @ 20:46)  HR: 83 (07-01-25 @ 12:19) (64 - 83)  BP: 128/78 (07-01-25 @ 12:19) (128/78 - 160/78)  RR: 18 (07-01-25 @ 12:19) (18 - 18)  SpO2: 97% (07-01-25 @ 12:19) (97% - 99%)    Gen: NAD; + mask-like facies (mild); no major tremor noted  HEENT: PERRL, EOMI, mouth clr, nose clr  Neck: no nodes, no JVD, thyroid nl  lungs: clr  hrt: s1 s2 rrr no murmur  abd: soft, NT/ND, no HS megaly  ext: b/l 1+ edema (R>L), no c/c  neuro: aa, hard to test orientation; almost completely aphasic; follows commands well; cn grossly intact, can move all 4 ext, but seems globally weak (not focal); sensation intact x4 limbs    LABS:                      13.7    (    98.5   8.44  )-----------( ---------      160      ( 01 Jul 2025 06:26 )             40.2    (    12.2     139   (   106   (   63      07-01-25 @ 06:26  ----------------------               4.5   (   21   (   11                             -----                        0.8  Ca  8.5   Mg  1.8    P   3.5     LFT  5.6  (  1.4  (  22       07-01-25 @ 06:26  -------------------------  3.2  (  82  (  46    PT/INR - ( 01 Jul 2025 06:26 )   PT: 11.90 sec;   INR: 1.01 ratio    PTT - ( 01 Jul 2025 06:26 )  PTT: 27.5 sec    Urinalysis Basic - ( 01 Jul 2025 06:26 )    Color: x / Appearance: x / SG: x / pH: x  Gluc: 63 mg/dL / Ketone: x  / Bili: x / Urobili: x   Blood: x / Protein: x / Nitrite: x   Leuk Esterase: x / RBC: x / WBC x   Sq Epi: x / Non Sq Epi: x / Bacteria: x    CAPILLARY BLOOD GLUCOSE  POCT Blood Glucose.: 91 (06-30-25 @ 23:30)  POCT Blood Glucose.: 93 (06-30-25 @ 14:56)    RADIOLOGY & ADDITIONAL TESTS:  < from: Xray Pelvis AP only (06.30.25 @ 17:09) >  No acute displaced fracture.    < end of copied text >    < from: Xray Chest 1 View AP/PA (06.30.25 @ 17:09) >  No radiographic evidence of acute cardiopulmonary disease.    Please see separately dictated CT chest report for intrathoracic findings.    < end of copied text >    < from: CT Chest w/ IV Cont (06.30.25 @ 16:21) >  1. No definite evidence of acute traumatic injury within the chest, abdomen or pelvis.  2. Multiple new scattered bilateral pulmonary nodules, some in a   tree-in-bud configuration, possibly postinflammatory; follow-up CT chest within 3 months may be of use.  3. New soft tissue thickening/fat infiltration along the bilateral   posterior/buttock soft tissues, possibly reflecting underlying   cellulitis; clinical correlation may be of use. < this is NOT true, clinically>    < end of copied text >    < from: CT Cervical Spine No Cont (06.30.25 @ 16:11) >  CT HEAD:  No acute intracranial findings.    Stable moderate chronic microvascular ischemic changes and small scattered chronic infarcts.    CT CERVICAL SPINE:  No acute cervical fracture or facet subluxation.    New 0.9 cm left apical nodule since the prior CTA from 4/27/2025. Please   see separately dictated report of the concurrent CT chest.    < end of copied text >    MEDICATIONS:    acetaminophen     Tablet .. 650 milliGRAM(s) Oral every 6 hours PRN  aspirin enteric coated 81 milliGRAM(s) Oral daily  atorvastatin 80 milliGRAM(s) Oral at bedtime  carbidopa/levodopa  25/100 2 Tablet(s) Oral three times a day  carbidopa/levodopa CR 50/200 1 Tablet(s) Oral <User Schedule>  chlorhexidine 2% Cloths 1 Application(s) Topical daily  clopidogrel Tablet 75 milliGRAM(s) Oral daily  enoxaparin Injectable 40 milliGRAM(s) SubCutaneous every 24 hours  entacapone 200 milliGRAM(s) Oral four times a day  levETIRAcetam 500 milliGRAM(s) Oral two times a day  melatonin 3 milliGRAM(s) Oral at bedtime  mirtazapine 7.5 milliGRAM(s) Oral at bedtime  pantoprazole    Tablet 40 milliGRAM(s) Oral before breakfast  polyethylene glycol 3350 17 Gram(s) Oral two times a day  senna 2 Tablet(s) Oral at bedtime

## 2025-07-01 NOTE — SPEECH LANGUAGE PATHOLOGY EVALUATION - YES/NO QUESTIONS: BASIC
Pt answered basic yes/no questions such as :Are the lights on?" and "Is it summer?" with max cues/no

## 2025-07-01 NOTE — SPEECH LANGUAGE PATHOLOGY EVALUATION - 2-STEP
Pt was not able to follow 2-step directions independently. However, given visual models, Pt was able to follow 2-step directions./no

## 2025-07-01 NOTE — SWALLOW BEDSIDE ASSESSMENT ADULT - COMMENTS
SLP History:   5/7/2025: Inpatient rehab [facility]. SLP recs for minced and moist w/ thin liquids via small sips/bites w/ no straws.

## 2025-07-02 ENCOUNTER — NON-APPOINTMENT (OUTPATIENT)
Age: 75
End: 2025-07-02

## 2025-07-02 ENCOUNTER — APPOINTMENT (OUTPATIENT)
Dept: CARDIOLOGY | Facility: CLINIC | Age: 75
End: 2025-07-02
Payer: MEDICARE

## 2025-07-02 PROCEDURE — 99222 1ST HOSP IP/OBS MODERATE 55: CPT

## 2025-07-02 PROCEDURE — 93298 REM INTERROG DEV EVAL SCRMS: CPT

## 2025-07-02 PROCEDURE — 99232 SBSQ HOSP IP/OBS MODERATE 35: CPT

## 2025-07-02 RX ORDER — NIFEDIPINE 30 MG
30 TABLET, EXTENDED RELEASE 24 HR ORAL ONCE
Refills: 0 | Status: COMPLETED | OUTPATIENT
Start: 2025-07-02 | End: 2025-07-02

## 2025-07-02 RX ORDER — SODIUM CHLORIDE 9 G/1000ML
500 INJECTION, SOLUTION INTRAVENOUS ONCE
Refills: 0 | Status: DISCONTINUED | OUTPATIENT
Start: 2025-07-02 | End: 2025-07-02

## 2025-07-02 RX ADMIN — LEVETIRACETAM 500 MILLIGRAM(S): 10 INJECTION, SOLUTION INTRAVENOUS at 17:24

## 2025-07-02 RX ADMIN — Medication 2 TABLET(S): at 13:46

## 2025-07-02 RX ADMIN — ATORVASTATIN CALCIUM 80 MILLIGRAM(S): 80 TABLET, FILM COATED ORAL at 21:10

## 2025-07-02 RX ADMIN — Medication 3 MILLIGRAM(S): at 21:10

## 2025-07-02 RX ADMIN — Medication 40 MILLIGRAM(S): at 05:19

## 2025-07-02 RX ADMIN — ENTACAPONE 200 MILLIGRAM(S): 200 TABLET, FILM COATED ORAL at 22:18

## 2025-07-02 RX ADMIN — POLYETHYLENE GLYCOL 3350 17 GRAM(S): 17 POWDER, FOR SOLUTION ORAL at 17:24

## 2025-07-02 RX ADMIN — LEVETIRACETAM 500 MILLIGRAM(S): 10 INJECTION, SOLUTION INTRAVENOUS at 05:19

## 2025-07-02 RX ADMIN — ENTACAPONE 200 MILLIGRAM(S): 200 TABLET, FILM COATED ORAL at 17:23

## 2025-07-02 RX ADMIN — ENTACAPONE 200 MILLIGRAM(S): 200 TABLET, FILM COATED ORAL at 05:20

## 2025-07-02 RX ADMIN — Medication 2 TABLET(S): at 21:09

## 2025-07-02 RX ADMIN — ENTACAPONE 200 MILLIGRAM(S): 200 TABLET, FILM COATED ORAL at 11:54

## 2025-07-02 RX ADMIN — Medication 30 MILLIGRAM(S): at 20:00

## 2025-07-02 RX ADMIN — Medication 81 MILLIGRAM(S): at 11:54

## 2025-07-02 RX ADMIN — ENOXAPARIN SODIUM 40 MILLIGRAM(S): 100 INJECTION SUBCUTANEOUS at 05:21

## 2025-07-02 RX ADMIN — Medication 1 TABLET(S): at 21:10

## 2025-07-02 RX ADMIN — CLOPIDOGREL BISULFATE 75 MILLIGRAM(S): 75 TABLET, FILM COATED ORAL at 11:54

## 2025-07-02 RX ADMIN — Medication 2 TABLET(S): at 21:10

## 2025-07-02 RX ADMIN — Medication 2 TABLET(S): at 05:19

## 2025-07-02 RX ADMIN — MIRTAZAPINE 7.5 MILLIGRAM(S): 30 TABLET, FILM COATED ORAL at 21:09

## 2025-07-02 RX ADMIN — Medication 1 APPLICATION(S): at 11:58

## 2025-07-02 NOTE — PROGRESS NOTE ADULT - SUBJECTIVE AND OBJECTIVE BOX
ZAKIA MILLS  75y, Male  Allergy: sulfa drugs (Unknown)  penicillin (Unknown)  cefaclor (Unknown)    Hospital Day: 2d    Patient seen and examined. No acute events overnight    PMH/PSH:  PAST MEDICAL & SURGICAL HISTORY:  Parkinson disease      CVA (cerebral vascular accident)      HTN (hypertension)      Diabetes mellitus      Gout      History of appendectomy          VITALS:  T(F): 98 (07-02-25 @ 13:54), Max: 98.7 (07-01-25 @ 20:08)  HR: 82 (07-02-25 @ 13:54)  BP: 159/94 (07-02-25 @ 13:54) (132/78 - 159/94)  RR: 18 (07-02-25 @ 13:54)  SpO2: 115% (07-02-25 @ 13:54)    TESTS & MEASUREMENTS:  Weight (Kg):                             13.7   8.44  )-----------( 160      ( 01 Jul 2025 06:26 )             40.2     PT/INR - ( 01 Jul 2025 06:26 )   PT: 11.90 sec;   INR: 1.01 ratio         PTT - ( 01 Jul 2025 06:26 )  PTT:27.5 sec  07-01    139  |  106  |  11  ----------------------------<  63[L]  4.5   |  21  |  0.8    Ca    8.5      01 Jul 2025 06:26  Phos  3.5     07-01  Mg     1.8     07-01    TPro  5.6[L]  /  Alb  3.2[L]  /  TBili  1.4[H]  /  DBili  x   /  AST  22  /  ALT  46[H]  /  AlkPhos  82  07-01    LIVER FUNCTIONS - ( 01 Jul 2025 06:26 )  Alb: 3.2 g/dL / Pro: 5.6 g/dL / ALK PHOS: 82 U/L / ALT: 46 U/L / AST: 22 U/L / GGT: x                 Urinalysis Basic - ( 01 Jul 2025 06:26 )    Color: x / Appearance: x / SG: x / pH: x  Gluc: 63 mg/dL / Ketone: x  / Bili: x / Urobili: x   Blood: x / Protein: x / Nitrite: x   Leuk Esterase: x / RBC: x / WBC x   Sq Epi: x / Non Sq Epi: x / Bacteria: x        RADIOLOGY & ADDITIONAL TESTS:    RECENT DIAGNOSTIC ORDERS:      MEDICATIONS:  MEDICATIONS  (STANDING):  aspirin enteric coated 81 milliGRAM(s) Oral daily  atorvastatin 80 milliGRAM(s) Oral at bedtime  carbidopa/levodopa  25/250 2 Tablet(s) Oral three times a day  carbidopa/levodopa CR 50/200 1 Tablet(s) Oral <User Schedule>  chlorhexidine 2% Cloths 1 Application(s) Topical daily  clopidogrel Tablet 75 milliGRAM(s) Oral daily  enoxaparin Injectable 40 milliGRAM(s) SubCutaneous every 24 hours  entacapone 200 milliGRAM(s) Oral four times a day  levETIRAcetam 500 milliGRAM(s) Oral two times a day  melatonin 3 milliGRAM(s) Oral at bedtime  mirtazapine 7.5 milliGRAM(s) Oral at bedtime  pantoprazole    Tablet 40 milliGRAM(s) Oral before breakfast  polyethylene glycol 3350 17 Gram(s) Oral two times a day  senna 2 Tablet(s) Oral at bedtime    MEDICATIONS  (PRN):  acetaminophen     Tablet .. 650 milliGRAM(s) Oral every 6 hours PRN Moderate Pain (4 - 6)      HOME MEDICATIONS:  aspirin 81 mg oral delayed release tablet (04-29)  atorvastatin 80 mg oral tablet (05-01)  carbidopa-levodopa 25 mg-100 mg oral tablet (07-01)  carbidopa-levodopa 50 mg-200 mg oral tablet, extended release (05-14)  clopidogrel 75 mg oral tablet (05-01)  levETIRAcetam 500 mg oral tablet (05-01)  polyethylene glycol 3350 oral powder for reconstitution (05-01)  rivastigmine 9.5 mg/24 hr transdermal film, extended release (05-01)  senna leaf extract oral tablet (05-01)      REVIEW OF SYSTEMS:  All other review of systems is negative unless indicated above.     PHYSICAL EXAM:  PHYSICAL EXAM:  GENERAL: NAD  HEAD:  Atraumatic, Normocephalic  NECK: Supple, No JVD  CHEST/LUNG: Clear to auscultation bilaterally; No wheeze  HEART: Regular rate and rhythm; No murmurs, rubs, or gallops  ABDOMEN: Soft, Nontender, Nondistended; Bowel sounds present  EXTREMITIES:  2+ Peripheral Pulses, No clubbing, cyanosis, or edema  SKIN: No rashes or lesions

## 2025-07-02 NOTE — OCCUPATIONAL THERAPY INITIAL EVALUATION ADULT - ADDITIONAL COMMENTS
Pt has aphasia and unable to provide PLOF. As per chart, pt was in an assisted living PTA. However, pt was falling frequently

## 2025-07-02 NOTE — PROGRESS NOTE ADULT - SUBJECTIVE AND OBJECTIVE BOX
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 Today is hospital day 2d. This morning patient was seen and examined at bedside, resting comfortably in bed. Patient is a poor historian. Was able to respond to written questions with pointing to the answers. Patient indicated via pointing that he was feeling "good". No acute or major events overnight. Seen reading in the bed.    PAST MEDICAL & SURGICAL HISTORY  Parkinson disease    CVA (cerebral vascular accident)    HTN (hypertension)    Diabetes mellitus    Gout    History of appendectomy      SOCIAL HISTORY:  Social History:      ALLERGIES:  sulfa drugs (Unknown)  penicillin (Unknown)  cefaclor (Unknown)    MEDICATIONS:  STANDING MEDICATIONS  aspirin enteric coated 81 milliGRAM(s) Oral daily  atorvastatin 80 milliGRAM(s) Oral at bedtime  carbidopa/levodopa  25/250 2 Tablet(s) Oral three times a day  carbidopa/levodopa CR 50/200 1 Tablet(s) Oral <User Schedule>  chlorhexidine 2% Cloths 1 Application(s) Topical daily  clopidogrel Tablet 75 milliGRAM(s) Oral daily  enoxaparin Injectable 40 milliGRAM(s) SubCutaneous every 24 hours  entacapone 200 milliGRAM(s) Oral four times a day  levETIRAcetam 500 milliGRAM(s) Oral two times a day  melatonin 3 milliGRAM(s) Oral at bedtime  mirtazapine 7.5 milliGRAM(s) Oral at bedtime  pantoprazole    Tablet 40 milliGRAM(s) Oral before breakfast  polyethylene glycol 3350 17 Gram(s) Oral two times a day  senna 2 Tablet(s) Oral at bedtime    PRN MEDICATIONS  acetaminophen     Tablet .. 650 milliGRAM(s) Oral every 6 hours PRN    VITALS:   T(F): 98  HR: 82  BP: 159/94  RR: 18  SpO2: 115%    PHYSICAL EXAM:  GENERAL:   ( x ) NAD, lying in bed comfortably     (  ) obtunded     (  ) lethargic     (  ) somnolent    HEAD:   ( x ) Atraumatic     (  ) hematoma     (  ) laceration (specify location:       )     NECK:  ( x ) Supple     (  ) neck stiffness     (  ) nuchal rigidity     (  )  no JVD     (  ) JVD present ( -- cm)    HEART:  Rate -->     ( x ) normal rate     (  ) bradycardic     (  ) tachycardic  Rhythm -->     (x  ) regular     (  ) regularly irregular     (  ) irregularly irregular  Murmurs -->     ( x ) normal s1s2     (  ) systolic murmur     (  ) diastolic murmur     (  ) continuous murmur      (  ) S3 present     (  ) S4 present    LUNGS:   (x  )Unlabored respirations     (  ) tachypnea  (x  ) B/L air entry     (  ) decreased breath sounds in:  (location     )    (  ) no adventitious sound     (  ) crackles     (  ) wheezing      (  ) rhonchi      (specify location:       )  (  ) chest wall tenderness (specify location:       )    ABDOMEN:   ( x ) Soft     (  ) tense   |   (x  ) nondistended     (  ) distended   |   ( x ) +BS     (  ) hypoactive bowel sounds     (  ) hyperactive bowel sounds  (  ) nontender     (  ) RUQ tenderness     (  ) RLQ tenderness     (  ) LLQ tenderness     (  ) epigastric tenderness     (  ) diffuse tenderness  (  ) Splenomegaly      (  ) Hepatomegaly      (  ) Jaundice     (  ) ecchymosis     EXTREMITIES:  ( x ) Normal     (  ) Rash     (  ) ecchymosis     (  ) varicose veins      (  ) pitting edema     (  ) non-pitting edema   (  ) ulceration     (  ) gangrene:     (location:     )    NERVOUS SYSTEM:    ( X ) A&Ox0     (X) confused     (  ) lethargic  CN II-XII:     (  ) Intact     ( X ) deficits found     (Specify: R Facial Droop)   Upper extremities:     (  ) no sensorimotor deficits     ( X ) weakness     (  ) loss of proprioception/vibration     (  ) loss of touch/temperature (specify:    )  Lower extremities:     (  ) no sensorimotor deficits     ( X ) weakness     (  ) loss of proprioception/vibration     (  ) loss of touch/temperature (specify:    )    SKIN:   ( x ) No rashes or lesions     (  ) maculopapular rash     (  ) pustules     (  ) vesicles     (  ) ulcer     (  ) ecchymosis     (specify location:     )    LABS:                        13.7   8.44  )-----------( 160      ( 01 Jul 2025 06:26 )             40.2     07-01    139  |  106  |  11  ----------------------------<  63[L]  4.5   |  21  |  0.8    Ca    8.5      01 Jul 2025 06:26  Phos  3.5     07-01  Mg     1.8     07-01    TPro  5.6[L]  /  Alb  3.2[L]  /  TBili  1.4[H]  /  DBili  x   /  AST  22  /  ALT  46[H]  /  AlkPhos  82  07-01    PT/INR - ( 01 Jul 2025 06:26 )   PT: 11.90 sec;   INR: 1.01 ratio         PTT - ( 01 Jul 2025 06:26 )  PTT:27.5 sec  Urinalysis Basic - ( 01 Jul 2025 06:26 )    Color: x / Appearance: x / SG: x / pH: x  Gluc: 63 mg/dL / Ketone: x  / Bili: x / Urobili: x   Blood: x / Protein: x / Nitrite: x   Leuk Esterase: x / RBC: x / WBC x   Sq Epi: x / Non Sq Epi: x / Bacteria: x

## 2025-07-02 NOTE — PROGRESS NOTE ADULT - ASSESSMENT
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 No events overnight. Patient's vitals wnl. PE unchanged from previous admissions. Trauma workup negative s/p recurrent falls.    # Frequent falls; s/p multiple CVA's, Cerebro Vascular Disease (multifocal left M2 and right VA disease) residual expressive aphasia/dysarthria  -  pt fell 8-10 times since Thursday -> HCP says pt forgets that he is told not to get up on his own  xray pelvis: neg  CXR: neg  CT C: mult new scat b/l pulm nod w/ tree-in-bud formation -> could be recent aspirations and/or viral bronchiolitis   CT A/P: neg  CT H: stable mod chr microvasc isch changes and small scat chr infarcts  CT C Sp: neg  c/w DAPT  c/w lip 80 hs   neuro f/u  tyl prn pain    # Parkinson's; poss onset Dementia (PD vs vasc vs both)  c/w entacapone 200 q6  c/w carvidopa-levodopa ER and immediate release  On home rivastigmine transdermal patch   c/w melatonin 3mg po qhs  c/w remeron 7.5mg po qhs  bowel reg: PEG q12 + senna 2 hs    # RLE swelling w/ pitting edema  f/u RLE duplex     # HTN  no longer on medications    # DM  a1c 5.6 in 04/2025  no longer on meds    # Seizures  c/w keppra    # DVT PPx- LVNX  # GI PPx- PPI   # Code- FULL  # Activity: pending PMR/PT/OT evals, increase as tolerated   # Dispo: rehab facility 
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) residual expressive aphasia/dysarthria/R facial droop, sent from assisted living for frequent falls    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) residual expressive aphasia/dysarthria  Recent admission to inpt rehab on 05/2025 and DC'ed to Assisted living facility ([facility])  Per chart, pt fell 8-10 times since Thursday -> HCP says pt forgets that he is told not to get up on his own  xray pelvis: neg  CXR: neg  CT C: mult new scat b/l pulm nod w/ tree-in-bud formation -> could be recent aspirations and/or viral bronchiolitis   CT A/P: neg  CT H: stable mod chr microvasc isch changes and small scat chr infarcts  CT C Sp: neg  c/w DAPT  c/w lip 80 hs   neuro f/u  tyl prn pain  PMR eval - good candidate for acute rehab  HCP would like pt to go to either [facility] for rehab or [facility] (he has been here before)  DC planning pending placement with CM    # Parkinson's; poss onset Dementia (PD vs vasc vs both)  c/w entacapone 200 q6  c/w carvidopa-levodopa ER and immediate release  On home rivastigmine transdermal patch   c/w melatonin 3mg po qhs  c/w remeron 7.5mg po qhs  bowel reg: PEG q12 + senna 2 hs    # RLE swelling w/ pitting edema  f/u RLE duplex     # HTN  no longer on medications    # DM  a1c 5.6 in 04/2025  no longer on meds    # Seizures  c/w keppra    # DVT PPx- LVNX    # HCP, Shanon Aguila ([phone]) - pt's long-time friend and manager    DC planning in progress, awaiting bed offers
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 He wrote the baseline for the song "For the [employer] of Money." He has a brilliant mind. He has never smoked, drank or used and drugs in his entire life. He has lead a truly remarkable life. [place] and fell on stage at end of show and needed asst getting up. He then flew to [place], but could not get out of plane. Pt was hospitalized and this was the begin of his Parkinson's hx several years ago.    # limit routine labs    # HCP would like pt to go to either [facility] for rehab or [facility] re[facility] (he has been here before)  to assist w/ placement, CM needs:  PT/OT eval  Sp/Sw eval: Diet: Minced and moist per last admission  PMR eval  Neuro eval: hx PD and recent CVAs    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) residual expressive aphasia/dysarthria  - Recent admission to inpt rehab on 05/2025 and DC'ed to Assisted living facility ([place])  Per chart, pt fell 8-10 times since Thursday -> HCP says pt forgets that he is told not to get up on his own  xray pelvis: neg  CXR: neg  CT C: mult new scat b/l pulm nod w/ tree-in-bud formation -> could be recent aspirations and/or viral bronchiolitis   CT A/P: neg  CT H: stable mod chr microvasc isch changes and small scat chr infarcts  CT C Sp: neg  c/w DAPT  c/w lip 80 hs   neuro f/u  tyl prn pain    # Parkinson's; poss onset Dementia (PD vs vasc vs both)  c/w entacapone 200 q6  c/w carvidopa-levodopa ER and immediate release  On home rivastigmine transdermal patch   c/w melatonin 3mg po qhs  c/w remeron 7.5mg po qhs  bowel reg: PEG q12 + senna 2 hs    # RLE swelling w/ pitting edema  f/u RLE duplex     # HTN  no longer on medications    # DM  a1c 5.6 in 04/2025  no longer on meds    # Seizures  c/w keppra    # DVT PPx- LVNX    # GI PPx- PPI     # Code- FULL    # Activity: can work w/ PT; needs a lot of asst; needs walker; PMR/PT/OT evals    # HCP, Shanon Aguila ([phone]) - pt's long-time friend and manager: we spoke via phone at length    # Dispo: f/u Neuro/Sp-Sw/PT-OT/PMR; f/u [facility]  eventually, pt will need rehab at a facility - f/u CM - after neuro review, pt should be stable for d/c    Long term prog seems poor (and is worse from when I saw him in 2/2024).
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 No events overnight. Patient's vitals wnl. PE unchanged from previous admissions. Patient [facility].    # Frequent falls; s/p multiple CVA's, Cerebro Vascular Disease (multifocal left M2 and right VA disease) residual expressive aphasia/dysarthria  Trauma workup negative  c/w DAPT  c/w lip 80 hs   tyl prn pain    # Parkinson's; poss onset Dementia (PD vs vasc vs both)  - Neuro following  - increased sinemet dose to 50/500 TID   - c/w sinemet 50/200 ER qHS  - c/w entacapone 200 QID  - On home rivastigmine transdermal patch   - c/w melatonin 3mg po qhs  - c/w remeron 7.5mg po qhs  bowel reg: PEG q12 + senna 2 hs    # RLE swelling w/ pitting edema  VA Duplex Lower Ext Vein Scan, Right- Right lower extremity negative for DVT.    # HTN  no longer on medications    # DM  a1c 5.6 in 04/2025  no longer on meds    # Seizures  c/w keppra    # DVT PPx- LVNX  # GI PPx- PPI   # Code- FULL  # Activity: pending PMR/PT/OT evals, increase as tolerated   # Dispo: rehab facility

## 2025-07-02 NOTE — OCCUPATIONAL THERAPY INITIAL EVALUATION ADULT - GENERAL OBSERVATIONS, REHAB EVAL
Pt encountered supine in bed +IV lock. PT with condom catheter that was off pt. RN Sharlene made aware. Pt seen 9:15-9:40

## 2025-07-02 NOTE — OCCUPATIONAL THERAPY INITIAL EVALUATION ADULT - LEVEL OF CONSCIOUSNESS, OT EVAL
clothing/shoes/cell phone Quality 130: Documentation Of Current Medications In The Medical Record: Current Medications Documented Detail Level: Detailed Quality 110: Preventive Care And Screening: Influenza Immunization: Influenza Immunization not Administered for Documented Reasons. Quality 431: Preventive Care And Screening: Unhealthy Alcohol Use - Screening: Patient not identified as an unhealthy alcohol user when screened for unhealthy alcohol use using a systematic screening method Quality 226: Preventive Care And Screening: Tobacco Use: Screening And Cessation Intervention: Patient screened for tobacco use and is an ex/non-smoker alert

## 2025-07-02 NOTE — OCCUPATIONAL THERAPY INITIAL EVALUATION ADULT - PERTINENT HX OF CURRENT PROBLEM, REHAB EVAL
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 No events overnight. Patient's vitals wnl. PE unchanged from previous admissions. Trauma workup negative s/p recurrent falls.  # Frequent falls; s/p multiple CVA's, Cerebro Vascular Disease (multifocal left M2 and right VA disease) residual expressive aphasia/dysarthria.  pt fell 8-10 times since Thursday -> HCP says pt forgets that he is told not to get up on his own
Non-compliant with treatment

## 2025-07-03 ENCOUNTER — TRANSCRIPTION ENCOUNTER (OUTPATIENT)
Age: 75
End: 2025-07-03

## 2025-07-03 VITALS
OXYGEN SATURATION: 99 % | DIASTOLIC BLOOD PRESSURE: 106 MMHG | SYSTOLIC BLOOD PRESSURE: 166 MMHG | RESPIRATION RATE: 18 BRPM | HEART RATE: 78 BPM | TEMPERATURE: 98 F

## 2025-07-03 PROCEDURE — 99239 HOSP IP/OBS DSCHRG MGMT >30: CPT

## 2025-07-03 RX ORDER — CARBIDOPA/LEVODOPA 25MG-100MG
2 TABLET ORAL
Refills: 0 | DISCHARGE

## 2025-07-03 RX ORDER — CARBIDOPA/LEVODOPA 25MG-100MG
2 TABLET ORAL
Qty: 0 | Refills: 0 | DISCHARGE
Start: 2025-07-03

## 2025-07-03 RX ORDER — ENTACAPONE 200 MG/1
1 TABLET, FILM COATED ORAL
Qty: 0 | Refills: 0 | DISCHARGE
Start: 2025-07-03

## 2025-07-03 RX ORDER — CARBIDOPA/LEVODOPA 25MG-100MG
1 TABLET ORAL
Qty: 0 | Refills: 0 | DISCHARGE
Start: 2025-07-03

## 2025-07-03 RX ADMIN — ENTACAPONE 200 MILLIGRAM(S): 200 TABLET, FILM COATED ORAL at 13:24

## 2025-07-03 RX ADMIN — ENOXAPARIN SODIUM 40 MILLIGRAM(S): 100 INJECTION SUBCUTANEOUS at 05:21

## 2025-07-03 RX ADMIN — Medication 2 TABLET(S): at 13:24

## 2025-07-03 RX ADMIN — Medication 40 MILLIGRAM(S): at 05:20

## 2025-07-03 RX ADMIN — ENTACAPONE 200 MILLIGRAM(S): 200 TABLET, FILM COATED ORAL at 05:21

## 2025-07-03 RX ADMIN — POLYETHYLENE GLYCOL 3350 17 GRAM(S): 17 POWDER, FOR SOLUTION ORAL at 05:21

## 2025-07-03 RX ADMIN — Medication 1 APPLICATION(S): at 13:25

## 2025-07-03 RX ADMIN — CLOPIDOGREL BISULFATE 75 MILLIGRAM(S): 75 TABLET, FILM COATED ORAL at 13:25

## 2025-07-03 RX ADMIN — Medication 2 TABLET(S): at 05:20

## 2025-07-03 RX ADMIN — Medication 81 MILLIGRAM(S): at 13:24

## 2025-07-03 RX ADMIN — LEVETIRACETAM 500 MILLIGRAM(S): 10 INJECTION, SOLUTION INTRAVENOUS at 05:21

## 2025-07-03 NOTE — DISCHARGE NOTE PROVIDER - CARE PROVIDER_API CALL
Jordan Soto  Neurology  [address] [place] [address], [address]  [place] [address]  Phone: [phone]  Fax: [phone]  Follow Up Time: 1 month    Ha Tejada  Internal Medicine  [address] [facility], [address]  [place] [address]  Phone: [phone]  Fax: [phone]  Follow Up Time: 1 month

## 2025-07-03 NOTE — DISCHARGE NOTE PROVIDER - NSDCMRMEDTOKEN_GEN_ALL_CORE_FT
acetaminophen 325 mg oral tablet: 2 tab(s) orally every 6 hours as needed for Temp greater or equal to 38C (100.4F), Mild Pain (1 - 3)  aspirin 81 mg oral delayed release tablet: 1 tab(s) orally once a day  atorvastatin 80 mg oral tablet: 1 tab(s) orally once a day (at bedtime)  carbidopa-levodopa 25 mg-100 mg oral tablet: 2 tab(s) orally 3 times a day  carbidopa-levodopa 50 mg-200 mg oral tablet, extended release: 1 tab(s) orally once a day (at bedtime) this dose at 22 00  clopidogrel 75 mg oral tablet: 1 tab(s) orally once a day  entacapone 200 mg oral tablet: 1 tab(s) orally 4 times a day at 7am, 12pm, and 5pmand 9 pm  with Sinemet please  levETIRAcetam 500 mg oral tablet: 1 tab(s) orally 2 times a day  Melatonin 3 mg oral tablet: 1 tab(s) orally once a day (at bedtime)  mirtazapine 7.5 mg oral tablet: 1 tab(s) orally once a day (at bedtime) MDD: 1  polyethylene glycol 3350 oral powder for reconstitution: 17 gram(s) orally 2 times a day  rivastigmine 9.5 mg/24 hr transdermal film, extended release: 1 patch transdermal every 24 hours  senna leaf extract oral tablet: 2 tab(s) orally once a day (at bedtime)   acetaminophen 325 mg oral tablet: 2 tab(s) orally every 6 hours as needed for Temp greater or equal to 38C (100.4F), Mild Pain (1 - 3)  aspirin 81 mg oral delayed release tablet: 1 tab(s) orally once a day  atorvastatin 80 mg oral tablet: 1 tab(s) orally once a day (at bedtime)  carbidopa-levodopa 25 mg-250 mg oral tablet: 2 tab(s) orally 3 times a day  carbidopa-levodopa 50 mg-200 mg oral tablet, extended release: 1 tab(s) orally once a day (at bedtime)  clopidogrel 75 mg oral tablet: 1 tab(s) orally once a day  entacapone 200 mg oral tablet: 1 tab(s) orally 4 times a day  levETIRAcetam 500 mg oral tablet: 1 tab(s) orally 2 times a day  Melatonin 3 mg oral tablet: 1 tab(s) orally once a day (at bedtime)  mirtazapine 7.5 mg oral tablet: 1 tab(s) orally once a day (at bedtime) MDD: 1  polyethylene glycol 3350 oral powder for reconstitution: 17 gram(s) orally 2 times a day  rivastigmine 9.5 mg/24 hr transdermal film, extended release: 1 patch transdermal every 24 hours  senna leaf extract oral tablet: 2 tab(s) orally once a day (at bedtime)

## 2025-07-03 NOTE — DISCHARGE NOTE NURSING/CASE MANAGEMENT/SOCIAL WORK - NSDCPEFALRISK_GEN_ALL_CORE
For information on Fall & Injury Prevention, visit: [email or web address] OR  [email or web address] OR  https://www.cdc.gov/steadi/patient.html

## 2025-07-03 NOTE — DISCHARGE NOTE PROVIDER - PROVIDER TOKENS
PROVIDER:[TOKEN:[[id number]],FOLLOWUP:[1 month]],PROVIDER:[TOKEN:[[id number]],FOLLOWUP:[1 month]]

## 2025-07-03 NOTE — DISCHARGE NOTE NURSING/CASE MANAGEMENT/SOCIAL WORK - PATIENT PORTAL LINK FT
You can access the FollowMyHealth Patient Portal offered by [facility] by registering at the following website: [email or web address]. By joining [facility]’s FollowMyHealth portal, you will also be able to view your health information using other applications (apps) compatible with our system.

## 2025-07-03 NOTE — DISCHARGE NOTE PROVIDER - ATTENDING DISCHARGE PHYSICAL EXAMINATION:
PHYSICAL EXAM:  GENERAL: NAD, well-developed  HEAD:  Atraumatic, Normocephalic  EYES: EOMI, PERRLA, conjunctiva and sclera clear  NECK: Supple, No JVD  CHEST/LUNG: Clear to auscultation bilaterally; No wheeze  HEART: Regular rate and rhythm; No murmurs, rubs, or gallops  ABDOMEN: Soft, Nontender, Nondistended; Bowel sounds present  EXTREMITIES:  2+ Peripheral Pulses, No clubbing, cyanosis, or edema  PSYCH: AAOx3  SKIN: No rashes or lesions PHYSICAL EXAM:  GENERAL: NAD, well-developed  HEAD:  Atraumatic, Normocephalic  EYES: EOMI, PERRLA, conjunctiva and sclera clear  NECK: Supple, No JVD  CHEST/LUNG: Clear to auscultation bilaterally; No wheeze  HEART: Regular rate and rhythm; No murmurs, rubs, or gallops  ABDOMEN: Soft, Nontender, Nondistended; Bowel sounds present  EXTREMITIES:  2+ Peripheral Pulses, No clubbing, cyanosis, or edema  PSYCH: AAOx1-2  SKIN: No rashes or lesions

## 2025-07-03 NOTE — DISCHARGE NOTE PROVIDER - NSDCFUSCHEDAPPT_GEN_ALL_CORE_FT
[facility] Physician [facility]  CARDIOLOGY [address] [facility]  Scheduled Appointment: 08/06/2025

## 2025-07-03 NOTE — DISCHARGE NOTE PROVIDER - HOSPITAL COURSE
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 Attempted to call healthcare proxy Shanon Aguila but pt's emergency contact hung up the phoe so hx is limited 2/2 pt's expressive aphasia. Per chart pt has been having recurrent falls admitted in  05/2025 to acute inpt rehab, and was dc'ed to [facility]. Per HIE has been receiving speech therapy and physical therapy w/o improvement.    Vitals:  T(F): 98.4   HR: 64   BP: 160/78  RR: 18   SpO2: 98%     SIG Labs Lactate 2.5, PH 3.0, PCO2 65    IMAGING: CT C/A/P w/ IV con  1. No definite evidence of acute traumatic injury within the chest,   abdomen or pelvis.  2. Multiple new scattered bilateral pulmonary nodules, some in a   tree-in-bud configuration, possibly postinflammatory; follow-up CT chest   within 3 months may be of use.  3. New soft tissue thickening/fat infiltration along the bilateral   posterior/buttock soft tissues, possibly reflecting underlying   cellulitis; clinical correlation may be of use.     CT HEAD:  No acute intracranial findings.  Stable moderate chronic microvascular ischemic changes and small   scattered chronic infarcts.    CT CERVICAL SPINE:  No acute cervical fracture or facet subluxation.    ED interventions: 1 L NS bolus    Hospital Course:  Patient was admitted for frequent falls in the setting of parkinsons disease and recent CVAs. His trauma workup was unremarkable. Lab studies did not show any abnormalities. He was seen by neurology team who recommended increasing sinemet dose to 50/500 TID. He was seen by physiatry and was approved for rehab facility. On discharge the patient was medically stable.     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) residual expressive aphasia/dysarthria  Recent admission to inpt rehab on 05/2025 and DC'ed to Assisted living facility ([facility])  Per chart, pt fell 8-10 times since Thursday -> HCP says pt forgets that he is told not to get up on his own  xray pelvis: neg  CXR: neg  CT C: mult new scat b/l pulm nod w/ tree-in-bud formation -> could be recent aspirations and/or viral bronchiolitis   CT A/P: neg  CT H: stable mod chr microvasc isch changes and small scat chr infarcts  CT C Sp: neg  c/w DAPT  c/w lip 80 hs   tyl prn pain  PMR eval - good candidate for acute rehab    # Parkinson's; poss onset Dementia (PD vs vasc vs both)  - increase sinemet dose to prior dose listed in May 2025 -  50/500 TID   - c/w sinemet 50/200 ER qHS  - c/w entacapone 200 QID  - On home rivastigmine transdermal patch   - c/w melatonin 3mg po qhs  - c/w remeron 7.5mg po qhs  - bowel reg: PEG q12 + senna 2 hs    # HTN  no longer on medications    # DM  a1c 5.6 in 04/2025  no longer on meds    # Seizures  c/w keppra

## 2025-07-03 NOTE — DISCHARGE NOTE PROVIDER - CARE PROVIDERS DIRECT ADDRESSES
[email or web address]

## 2025-07-03 NOTE — DISCHARGE NOTE NURSING/CASE MANAGEMENT/SOCIAL WORK - FINANCIAL ASSISTANCE
[facility] provides services at a reduced cost to those who are determined to be eligible through [facility]’s financial assistance program. Information regarding [facility]’s financial assistance program can be found by going to [email or web address] or by calling [phone].

## 2025-07-03 NOTE — DISCHARGE NOTE PROVIDER - NSDCCPCAREPLAN_GEN_ALL_CORE_FT
PRINCIPAL DISCHARGE DIAGNOSIS  Diagnosis: Frequent falls  Assessment and Plan of Treatment: You were admitted to the hospital due to frequent falls. Your medical history of Parkinson’s disease and prior strokes likely contributed to these falls. When you arrived, we did several tests including X-rays of your pelvis and chest, CT scans of your head, neck, chest, abdomen, and pelvis. None of these tests showed any new injuries from the falls. The CT scan of your chest did show some small spots on your lungs that we’ll want to monitor in the future with a repeat CT scan in 3 months you can schedule as outpatient. We also checked your head and neck, and those scans showed the same changes from your previous strokes. Your bloodwork was also normal. Neurology adjusted your Sinemet dose to help with your Parkinson’s symptoms. You were also seen by the physiatry team who recommended you go to a rehabilitation facility to help with your strength and balance to prevent further falls. By the time you were discharged, you were medically stable.     PRINCIPAL DISCHARGE DIAGNOSIS  Diagnosis: Frequent falls  Assessment and Plan of Treatment: You were admitted to the hospital due to frequent falls. Your medical history of Parkinson’s disease and prior strokes likely contributed to these falls. When you arrived, we did several tests including X-rays of your pelvis and chest, CT scans of your head, neck, chest, abdomen, and pelvis. None of these tests showed any new injuries from the falls. The CT scan of your chest did show some small spots on your lungs that we’ll want to monitor in the future with a repeat CT scan in 3 months you can schedule as outpatient. We also checked your head and neck, and those scans showed the same changes from your previous strokes. Your bloodwork was also normal. Neurology adjusted your Sinemet dose to help with your Parkinson’s symptoms. You were also seen by the physiatry team who recommended you go to a rehabilitation facility to help with your strength and balance to prevent further falls. By the time you were discharged, you were medically stable.  Sinemet dose increased to 50/500 three times a day, continue Sinemet CR 50/200 at bedtime. Continue entacapone 200 four times a day. Follow up with neurologist/movement specialist in 2-4 weeks for further medications management.

## 2025-07-03 NOTE — DISCHARGE NOTE NURSING/CASE MANAGEMENT/SOCIAL WORK - NSDCVIVACCINE_GEN_ALL_CORE_FT
Tdap; 12-May-2020 22:10; Angelina Gomez (RN); [employer]; [id number] (Exp. Date: 19-Apr-2021); IntraMuscular; Deltoid Right.; 0.5 milliLiter(s); VIS (VIS Published: 09-May-2013, VIS Presented: 12-May-2020);

## 2025-07-08 DIAGNOSIS — G20.A1 PARKINSON'S DISEASE WITHOUT DYSKINESIA, WITHOUT MENTION OF FLUCTUATIONS: ICD-10-CM

## 2025-07-08 DIAGNOSIS — Z88.1 ALLERGY STATUS TO OTHER ANTIBIOTIC AGENTS: ICD-10-CM

## 2025-07-08 DIAGNOSIS — Z79.82 LONG TERM (CURRENT) USE OF ASPIRIN: ICD-10-CM

## 2025-07-08 DIAGNOSIS — Z88.2 ALLERGY STATUS TO SULFONAMIDES: ICD-10-CM

## 2025-07-08 DIAGNOSIS — I69.322 DYSARTHRIA FOLLOWING CEREBRAL INFARCTION: ICD-10-CM

## 2025-07-08 DIAGNOSIS — F32.A DEPRESSION, UNSPECIFIED: ICD-10-CM

## 2025-07-08 DIAGNOSIS — R60.0 LOCALIZED EDEMA: ICD-10-CM

## 2025-07-08 DIAGNOSIS — R29.6 REPEATED FALLS: ICD-10-CM

## 2025-07-08 DIAGNOSIS — G40.909 EPILEPSY, UNSPECIFIED, NOT INTRACTABLE, WITHOUT STATUS EPILEPTICUS: ICD-10-CM

## 2025-07-08 DIAGNOSIS — Z88.0 ALLERGY STATUS TO PENICILLIN: ICD-10-CM

## 2025-07-08 DIAGNOSIS — R91.8 OTHER NONSPECIFIC ABNORMAL FINDING OF LUNG FIELD: ICD-10-CM

## 2025-07-08 DIAGNOSIS — Z79.02 LONG TERM (CURRENT) USE OF ANTITHROMBOTICS/ANTIPLATELETS: ICD-10-CM

## 2025-07-08 DIAGNOSIS — I69.320 APHASIA FOLLOWING CEREBRAL INFARCTION: ICD-10-CM

## 2025-07-08 DIAGNOSIS — I69.392 FACIAL WEAKNESS FOLLOWING CEREBRAL INFARCTION: ICD-10-CM

## 2025-07-08 DIAGNOSIS — F02.80 DEMENTIA IN OTHER DISEASES CLASSIFIED ELSEWHERE, UNSPECIFIED SEVERITY, WITHOUT BEHAVIORAL DISTURBANCE, PSYCHOTIC DISTURBANCE, MOOD DISTURBANCE, AND ANXIETY: ICD-10-CM

## 2025-07-08 DIAGNOSIS — E11.9 TYPE 2 DIABETES MELLITUS WITHOUT COMPLICATIONS: ICD-10-CM

## 2025-07-08 DIAGNOSIS — I10 ESSENTIAL (PRIMARY) HYPERTENSION: ICD-10-CM

## 2025-07-08 DIAGNOSIS — D84.81 IMMUNODEFICIENCY DUE TO CONDITIONS CLASSIFIED ELSEWHERE: ICD-10-CM

## 2025-07-16 ENCOUNTER — APPOINTMENT (OUTPATIENT)
Dept: NEUROLOGY | Facility: CLINIC | Age: 75
End: 2025-07-16

## 2025-08-06 ENCOUNTER — APPOINTMENT (OUTPATIENT)
Dept: CARDIOLOGY | Facility: CLINIC | Age: 75
End: 2025-08-06

## 2025-09-11 ENCOUNTER — APPOINTMENT (OUTPATIENT)
Dept: CARDIOLOGY | Facility: CLINIC | Age: 75
End: 2025-09-11
Payer: MEDICARE

## 2025-09-11 ENCOUNTER — NON-APPOINTMENT (OUTPATIENT)
Age: 75
End: 2025-09-11

## 2025-09-11 PROCEDURE — 93298 REM INTERROG DEV EVAL SCRMS: CPT
